# Patient Record
Sex: FEMALE | Race: BLACK OR AFRICAN AMERICAN | Employment: OTHER | URBAN - METROPOLITAN AREA
[De-identification: names, ages, dates, MRNs, and addresses within clinical notes are randomized per-mention and may not be internally consistent; named-entity substitution may affect disease eponyms.]

---

## 2017-01-10 PROBLEM — I25.10 CAD (CORONARY ARTERY DISEASE): Chronic | Status: ACTIVE | Noted: 2017-01-09

## 2017-01-10 PROBLEM — F17.210 CIGARETTE SMOKER: Chronic | Status: ACTIVE | Noted: 2017-01-09

## 2017-01-10 PROBLEM — D72.829 LEUKOCYTOSIS: Status: ACTIVE | Noted: 2017-01-09

## 2017-01-10 PROBLEM — I50.23 ACUTE ON CHRONIC SYSTOLIC CHF (CONGESTIVE HEART FAILURE) (HCC): Status: ACTIVE | Noted: 2017-01-09

## 2017-01-10 PROBLEM — J44.9 COPD (CHRONIC OBSTRUCTIVE PULMONARY DISEASE) (HCC): Chronic | Status: ACTIVE | Noted: 2017-01-09

## 2017-01-10 PROBLEM — E11.9 DM2 (DIABETES MELLITUS, TYPE 2) (HCC): Chronic | Status: ACTIVE | Noted: 2017-01-09

## 2017-01-10 PROBLEM — R10.9 ABDOMINAL WALL PAIN: Status: ACTIVE | Noted: 2017-01-09

## 2017-01-10 PROBLEM — F12.90 MARIJUANA USE: Chronic | Status: ACTIVE | Noted: 2017-01-09

## 2017-02-08 PROBLEM — K29.00 ACUTE EROSIVE GASTRITIS: Status: ACTIVE | Noted: 2017-02-08

## 2017-02-08 PROBLEM — R10.9 ABDOMINAL WALL PAIN: Status: RESOLVED | Noted: 2017-01-09 | Resolved: 2017-02-08

## 2017-02-08 PROBLEM — K59.1 FUNCTIONAL DIARRHEA: Status: ACTIVE | Noted: 2017-02-08

## 2017-02-08 PROBLEM — R06.89 HYPERCARBIA: Status: ACTIVE | Noted: 2017-02-08

## 2017-03-09 PROBLEM — I25.5 ISCHEMIC CARDIOMYOPATHY: Status: ACTIVE | Noted: 2017-03-09

## 2017-04-19 PROBLEM — E11.8 TYPE 2 DIABETES MELLITUS WITH COMPLICATION (HCC): Status: ACTIVE | Noted: 2017-04-19

## 2017-07-05 PROBLEM — Z71.6 TOBACCO ABUSE COUNSELING: Status: ACTIVE | Noted: 2017-07-05

## 2017-07-05 PROBLEM — J44.1 COPD EXACERBATION (HCC): Chronic | Status: ACTIVE | Noted: 2017-01-09

## 2017-08-30 PROBLEM — I73.9 PVD (PERIPHERAL VASCULAR DISEASE) WITH CLAUDICATION (HCC): Status: ACTIVE | Noted: 2017-08-30

## 2017-09-08 PROBLEM — K29.00 ACUTE EROSIVE GASTRITIS: Status: RESOLVED | Noted: 2017-02-08 | Resolved: 2017-09-08

## 2017-09-08 PROBLEM — J96.02 ACUTE RESPIRATORY FAILURE WITH HYPERCAPNIA (HCC): Status: ACTIVE | Noted: 2017-09-08

## 2017-10-11 PROBLEM — Z72.0 TOBACCO ABUSE: Status: ACTIVE | Noted: 2017-10-11

## 2017-10-11 PROBLEM — M79.662 PAIN IN BOTH LOWER LEGS: Status: ACTIVE | Noted: 2017-10-11

## 2017-10-11 PROBLEM — M79.661 PAIN IN BOTH LOWER LEGS: Status: ACTIVE | Noted: 2017-10-11

## 2017-10-11 PROBLEM — I87.2 CHRONIC VENOUS INSUFFICIENCY: Status: ACTIVE | Noted: 2017-10-11

## 2017-10-11 PROBLEM — I87.8 VENOUS STASIS OF BOTH LOWER EXTREMITIES: Status: ACTIVE | Noted: 2017-10-11

## 2017-10-11 PROBLEM — L03.116 CELLULITIS OF LEFT LOWER LEG: Status: ACTIVE | Noted: 2017-10-11

## 2017-10-31 PROBLEM — R04.2 COUGH WITH HEMOPTYSIS: Status: ACTIVE | Noted: 2017-10-31

## 2017-11-02 PROBLEM — F12.10 MARIJUANA ABUSE, CONTINUOUS: Chronic | Status: ACTIVE | Noted: 2017-11-02

## 2017-11-02 PROBLEM — J69.0 ASPIRATION PNEUMONIA (HCC): Status: ACTIVE | Noted: 2017-11-02

## 2017-11-02 PROBLEM — K92.2 GI BLEEDING: Status: ACTIVE | Noted: 2017-11-02

## 2017-11-02 PROBLEM — I10 ESSENTIAL HYPERTENSION: Chronic | Status: ACTIVE | Noted: 2017-11-02

## 2018-03-19 ENCOUNTER — TELEPHONE (OUTPATIENT)
Dept: ADMINISTRATIVE | Age: 63
End: 2018-03-19

## 2018-03-28 DIAGNOSIS — M25.532 LEFT WRIST PAIN: Primary | ICD-10-CM

## 2018-03-29 ENCOUNTER — OFFICE VISIT (OUTPATIENT)
Dept: PHYSICAL MEDICINE AND REHAB | Age: 63
End: 2018-03-29
Payer: MEDICARE

## 2018-03-29 VITALS — BODY MASS INDEX: 45.82 KG/M2 | HEIGHT: 62 IN | WEIGHT: 249 LBS

## 2018-03-29 DIAGNOSIS — G56.03 BILATERAL CARPAL TUNNEL SYNDROME: Primary | ICD-10-CM

## 2018-03-29 DIAGNOSIS — M48.062 SPINAL STENOSIS OF LUMBAR REGION WITH NEUROGENIC CLAUDICATION: ICD-10-CM

## 2018-03-29 DIAGNOSIS — M79.605 PAIN IN BOTH LOWER EXTREMITIES: ICD-10-CM

## 2018-03-29 DIAGNOSIS — M79.604 PAIN IN BOTH LOWER EXTREMITIES: ICD-10-CM

## 2018-03-29 PROCEDURE — 99213 OFFICE O/P EST LOW 20 MIN: CPT | Performed by: PHYSICAL MEDICINE & REHABILITATION

## 2018-03-29 PROCEDURE — G8417 CALC BMI ABV UP PARAM F/U: HCPCS | Performed by: PHYSICAL MEDICINE & REHABILITATION

## 2018-03-29 PROCEDURE — G8599 NO ASA/ANTIPLAT THER USE RNG: HCPCS | Performed by: PHYSICAL MEDICINE & REHABILITATION

## 2018-03-29 PROCEDURE — 76942 ECHO GUIDE FOR BIOPSY: CPT | Performed by: PHYSICAL MEDICINE & REHABILITATION

## 2018-03-29 PROCEDURE — 95913 NRV CNDJ TEST 13/> STUDIES: CPT | Performed by: PHYSICAL MEDICINE & REHABILITATION

## 2018-03-29 PROCEDURE — 95886 MUSC TEST DONE W/N TEST COMP: CPT | Performed by: PHYSICAL MEDICINE & REHABILITATION

## 2018-03-29 PROCEDURE — 3017F COLORECTAL CA SCREEN DOC REV: CPT | Performed by: PHYSICAL MEDICINE & REHABILITATION

## 2018-03-29 PROCEDURE — 4004F PT TOBACCO SCREEN RCVD TLK: CPT | Performed by: PHYSICAL MEDICINE & REHABILITATION

## 2018-03-29 PROCEDURE — G8484 FLU IMMUNIZE NO ADMIN: HCPCS | Performed by: PHYSICAL MEDICINE & REHABILITATION

## 2018-03-29 PROCEDURE — 20526 THER INJECTION CARP TUNNEL: CPT | Performed by: PHYSICAL MEDICINE & REHABILITATION

## 2018-03-29 PROCEDURE — 3014F SCREEN MAMMO DOC REV: CPT | Performed by: PHYSICAL MEDICINE & REHABILITATION

## 2018-03-29 PROCEDURE — G8428 CUR MEDS NOT DOCUMENT: HCPCS | Performed by: PHYSICAL MEDICINE & REHABILITATION

## 2018-03-29 RX ORDER — TRIAMCINOLONE ACETONIDE 40 MG/ML
40 INJECTION, SUSPENSION INTRA-ARTICULAR; INTRAMUSCULAR ONCE
Status: COMPLETED | OUTPATIENT
Start: 2018-03-29 | End: 2018-03-29

## 2018-03-29 RX ORDER — LIDOCAINE HYDROCHLORIDE 10 MG/ML
1 INJECTION, SOLUTION INFILTRATION; PERINEURAL ONCE
Status: COMPLETED | OUTPATIENT
Start: 2018-03-29 | End: 2018-03-29

## 2018-03-29 RX ADMIN — LIDOCAINE HYDROCHLORIDE 1 ML: 10 INJECTION, SOLUTION INFILTRATION; PERINEURAL at 13:53

## 2018-03-29 RX ADMIN — TRIAMCINOLONE ACETONIDE 40 MG: 40 INJECTION, SUSPENSION INTRA-ARTICULAR; INTRAMUSCULAR at 13:54

## 2018-03-29 NOTE — PROGRESS NOTES
recording the abductor digiti minimi stimulating the ulnar nerve at the wrist, recording at the extensor digitorum brevis stimulating the peroneal nerve at the ankle and recording the abductor hallucis stimulating tibial at the medial malleolus. For H-Reflexes the site of stimulation is in the popliteal fossa and the recording site is the gastrocnemius. For F waves, the site of stimulation is at the wrist or ankle respectively and the muscle recorded is the same as that used for the compound motor unit action potentials as described in the table. Sensory NCS      Nerve / Sites Rec. Site Peak Lat PP Amp Segments Distance Velocity Temp. ms µV  cm m/s °C   R Median - Digit II (Antidromic)      Palm Dig II 1.67 31.8 Palm - Dig II 7 56 32.6      Wrist Dig II 3.44 28.9 Wrist - Dig II 14 53 32.6   L Median - Digit II (Antidromic)      Palm Dig II 2.40 8.2 Palm - Dig II 7 46 32.3      Wrist Dig II 6.15* 5.3 Wrist - Dig II 14 27 32.3   R Ulnar - Digit V (Antidromic)      Wrist Dig V 3.07 18.9 Wrist - Dig V 14 55 32.6   R Radial - Anatomical snuff box (Forearm)      Forearm Wrist 2.24 22.3 Forearm - Wrist 10 64 32.6   R Sural - Ankle (Calf)      Calf Ankle 3.54 12.4 Calf - Ankle 14 47 31.3   R Superficial peroneal - Ankle      Lat leg Ankle 2.81 7.9 Lat leg - Ankle 10 45 31.2       Combined Sensory Index      Nerve / Sites Rec. Site Peak Lat NP Amp PP Amp Segments Dist. Peak Diff Temp.      ms µV µV  cm ms °C   R Median - CSI      Median Thumb 2.97 13.0 23.9 Median - Radial 10 0.68* 32.3      Radial Thumb 2.29 12.3 14.4 Median - Ulnar 14 0.73* 32.3      Median Ring 3.70 8.8 10.9 Median palm - Ulnar palm 8        Ulnar Ring 2.97 0.05 3.5          CSI     CSI  1.41*            Motor NCS      Nerve / Sites Muscle Onset Amplitude Segments Distance Velocity Temp.     ms mV  cm m/s °C   R Median - APB      Palm APB 2.14 7.6 Palm - APB   32.3      Wrist APB 3.85 4.9 Wrist - Palm 8 47* 32.3      Elbow APB 7.92 4.2 Elbow - Wrist 21 52 32.3   L Median - APB      Palm APB 2.29 6.9 Palm - APB   32.3      Wrist APB 6.41* 6.6 Wrist - Palm 8 19* 32.3      Elbow APB 10.94 5.7 Elbow - Wrist 21.5 47 32.3   R Ulnar - ADM      Wrist ADM 2.92 11.9 Wrist - ADM 8  32.6      B. Elbow ADM 6.93 11.5 B. Elbow - Wrist 20 50 32.6      A. Elbow ADM 8.80 11.3 A. Elbow - B. Elbow 10 53 32.6   R Peroneal - EDB      Ankle EDB 3.33 5.4 Ankle - EDB 8  32.3      Fib head EDB 9.48 4.7 Fib head - Ankle 29 47 32.1      Above Knee EDB 11.72 4.2 Above Knee - Fib head 10 45 32.1   R Tibial - AH      Ankle AH 3.07 10.4 Ankle - AH 8  32.4      Pop fossa AH 12.14 8.3 Pop fossa - Ankle 39 43 32.4       F  Wave      Nerve Fmin % F    ms %   R Median - APB 25.99 80   R Ulnar - ADM 28.59 80   R Peroneal - EDB 43.96 60   R Tibial - AH 49.11 40   L Median - APB 34.17* 40       H Reflex      Nerve H Lat    ms   L Tibial - Soleus 32.19   R Tibial - Soleus 32.29          Disposable concentric needle EMG was performed on the Bilateral upper and lower extremities as listed below. Fibrillations, positive sharp waves and fasciculations are graded from none (0) to continuous (4+). The configuration and recruitment pattern of motor unit action potentials under voluntary control are described below. EMG      EMG Summary Table     Spontaneous MUAP Recruitment   Muscle Nerve Roots IA Fib PSW Fasc Amp Dur. PPP Pattern   L. Biceps brachii Musculocutaneous C5-C6 N None None None N N N N   L. Triceps brachii Radial C6-C8 N None None None N N N N   L. Pronator teres Median C6-C7 N None None None N N N N   L. First dorsal interosseous Ulnar C8-T1 N None None None N N N N   L. Abductor pollicis brevis Median L3-Z8 N None None None 1+ 1+ 1+ Reduced   L. Vastus medialis Femoral L2-L4 N None None None N N N N   L. Tibialis anterior Deep peroneal (Fibular) L4-L5 N None None None N N N N   L. Gastrocnemius (Medial head) Tibial S1-S2 N None None None N N N N   L.  Extensor hallucis longus Deep participate in the care of your patient.       Sincerely,       Jeannie Staley LPN, CNCT  [facility]il[facility] Gilford, D.O., P[facility]T., [facility]  Physical Medicine and Rehabilitation  Electrodiagnostic Medicine    Normal nerve Conduction Values    Sensory Nerves Peak to Peak  Amplitude  (mV) Peak Latency (ms)   Superficial Radial Sensory Antidromic (10cm) 11 2.8    Median Sensory Antidromic Dig II   Palm (7cm)  Wrist (14 cm)  Age 19-49 BMI <24  Age 47-78 BMI <24  Age 20-48 BMI >/= 24  Age 47-78 BMI >/= 24   8  13  19  15  13  8   2.3  4     Ulnar Sensory Antidromic Dig V (14 cm)  Age 20-48 BMI <24  Age 47-78 BMI <24  Age 20-48 BMI >/= 24  Age 47-78 BMI >/= 24 9  13  13  8  4 4   Medial Antebrachial cutaneous Sensory Antidromic (10 cm)   3 2.6   Lateral Antebrachial cutaneous Sensory Antidromic (10 cm) 6 2.5   Sural Sensory Antidromic (14 cm) 4 4.5     Medial and lateral Plantars (14 cm)   Compare side to side <3.8     Superficial peroneal sensory (10 cm)  Age <9  Age 7-34  Age 35-46  Age 52-63  Age >57   >6  >6  >5  >4  >3   <4.3  <4.4  <4.5  <4.6  <4.6     Saphenous sensory (12 cm)  Age <9  Age 7-34  Age 35-46  Age 52-63  Age >57   >6  >6  >4  >4  >3   <4.3  <4.4  <4.5  <4.6  <4.6     Dorsal ulnar sensory (8 cm)  Age <9  Age 7-34  Age 35-46  Age 52-63  Age >57   >24  >24  >16  >9  >5   <2.9  <3  <3.1  <3.1  <3.2         Study Latency difference (ms)   Median compared to (minus) ulnar motor comparison  All ages  Age 20-48  Age 47-78   1.5  1.4  1.7   Median to Ulnar comparison (second lumbrical/interossei)  0.4     Combined Sensory Index:   Study Latency Difference (ms)</=   Median to Ulnar Palmar Orthodromic mixed nerve comparison 0.3   Median to Ulnar sensory Ring finger comparison 0.4       Median: Radial sensory digit 1 comparison 0.5     Combined Sensory Index (CSI)  0.9       Motor Nerves Baseline to Peak Amplitude  (mV) Conduction Velocity (m/s) Distal Latency (ms)   Median motor APB  All ages  Men Age23 to 44  Men

## 2018-03-29 NOTE — PROGRESS NOTES
Nahomy Ace D.O. [facility] Physical Medicine and Rehabilitation  [address] [facility] [facility] [place]  Phone: [phone]  Fax: [phone]      Date of Examination: 03/29/18  Patient Name: Chrissy Henson    Dear Dr. Cassandra Munoz  is a 60y.o. year old woman who was seen today regarding EMG testing . As you know, this is due to complaints of pain in the bilateral hands R>L as well as low back with radiation to the lower extremities, numbness and tingling in the right hand, and weakness in the upper and lower extremities that has been present for approximately (2) yerars and started after no recent injury. There has been no associated vision change, hearing change, speech abnormality, swallowing abnormality, or bowel or bladder dysfunction. Previous workup has included: CT. A previous EMG  was done by an unknown provider approximately 3-4 years ago. Results are unavailable at time of testing. Patient could not recall name of provider. Antiplatelets:No. Coumadin: No  Mestinon: No. Botox No.     Adhesive: No.  Isopropyl alcohol: No.  Latex: Yes. There is not family history of neuromuscular conditions. Otherwise, the past medical, past surgical, family and social histories were reviewed and there were no pertinent changes. Medications and allergies were reviewed. Physical Exam: General: The patient is in no apparent distress. Body habitus is well developed. MSK: There is no joint effusion, deformity, instability, swelling, erythema or warmth. AROM is full in the spine and extremities. + Tinel bilateral wrists.  + SLR bilaterally. Neurologic:  No focal sensorimotor deficit. Reflexes 2+ and symmetric. Gait is normal.    Impression:     1. Bilateral carpal tunnel syndrome    2. Pain in both lower extremities        Plan:   · EMG is indicated to evaluate the above diagnosis.     · Consent: The patient was advised of the indications, risks, benefits and

## 2018-03-29 NOTE — PATIENT INSTRUCTIONS
Electrodiagnotic Laboratory  Accredited by the [facility] with Exemplary status  Johnna Butler, D.O Zilphia Gilford, D.O. [facility]  [address] [facility] [place]  Phone: [phone]  Fax: [phone]        Today you had an electrodiagnostic exam which included nerve conduction studies (NCS) and electromyography (EMG). This test evaluated the electrical activity of your nerves and muscles to help determine if you have a nerve or muscle disease. This test can help determine the location and type of a nerve or muscle problem. This will help your referring doctor diagnose your condition and determine the appropriate next step in your treatment plan. After your test:    1. There are no long lasting side effects of the test.     2. You may resume your normal activities without restrictions. 3.  Resume any medications that were stopped for the test.     4  If you have sore areas or bruising in your muscles where the needle was placed, apply a cold pack to the sore area for 15-20 minutes three to four times a day as needed for pain. The soreness should go away in about 1-2 days. 5. Your results were provided  Briefly at the end of your test and the final detailed report will be provided to your referring physician, and/or primary care physician and any other parties you requested within 1-2 days of the examination. You may wish to contact your referring provider after a few days to determine what they would like you to do next. 6.  Please call [phone] with any questions or concerns and if you develop increased body temperature/fever, swelling, tenderness, increased pain and/or drainage from the sites where the needle was placed. Thank you for choosing us for your health care needs.

## 2018-04-05 ENCOUNTER — HOSPITAL ENCOUNTER (OUTPATIENT)
Dept: INTERVENTIONAL RADIOLOGY/VASCULAR | Age: 63
Discharge: HOME | End: 2018-04-07
Payer: MEDICARE

## 2018-04-05 DIAGNOSIS — I73.9 PVD (PERIPHERAL VASCULAR DISEASE) WITH CLAUDICATION (HCC): ICD-10-CM

## 2018-04-05 PROCEDURE — 93925 LOWER EXTREMITY STUDY: CPT

## 2018-04-09 ENCOUNTER — TELEPHONE (OUTPATIENT)
Dept: PHYSICAL MEDICINE AND REHAB | Age: 63
End: 2018-04-09

## 2018-04-09 ENCOUNTER — OFFICE VISIT (OUTPATIENT)
Dept: ORTHOPEDIC SURGERY | Age: 63
End: 2018-04-09
Payer: MEDICARE

## 2018-04-09 DIAGNOSIS — G56.02 LEFT CARPAL TUNNEL SYNDROME: Primary | ICD-10-CM

## 2018-04-09 PROCEDURE — 3017F COLORECTAL CA SCREEN DOC REV: CPT | Performed by: ORTHOPAEDIC SURGERY

## 2018-04-09 PROCEDURE — G8417 CALC BMI ABV UP PARAM F/U: HCPCS | Performed by: ORTHOPAEDIC SURGERY

## 2018-04-09 PROCEDURE — G8599 NO ASA/ANTIPLAT THER USE RNG: HCPCS | Performed by: ORTHOPAEDIC SURGERY

## 2018-04-09 PROCEDURE — 99214 OFFICE O/P EST MOD 30 MIN: CPT | Performed by: ORTHOPAEDIC SURGERY

## 2018-04-09 PROCEDURE — 3014F SCREEN MAMMO DOC REV: CPT | Performed by: ORTHOPAEDIC SURGERY

## 2018-04-09 PROCEDURE — G8428 CUR MEDS NOT DOCUMENT: HCPCS | Performed by: ORTHOPAEDIC SURGERY

## 2018-04-09 PROCEDURE — 4004F PT TOBACCO SCREEN RCVD TLK: CPT | Performed by: ORTHOPAEDIC SURGERY

## 2018-04-10 ENCOUNTER — TELEPHONE (OUTPATIENT)
Dept: ADMINISTRATIVE | Age: 63
End: 2018-04-10

## 2018-04-11 ENCOUNTER — HOSPITAL ENCOUNTER (OUTPATIENT)
Age: 63
Discharge: HOME | End: 2018-04-13

## 2018-04-11 PROCEDURE — 88305 TISSUE EXAM BY PATHOLOGIST: CPT

## 2018-04-12 ENCOUNTER — TELEPHONE (OUTPATIENT)
Dept: VASCULAR SURGERY | Age: 63
End: 2018-04-12

## 2018-04-12 ENCOUNTER — TELEPHONE (OUTPATIENT)
Dept: PHYSICAL MEDICINE AND REHAB | Age: 63
End: 2018-04-12

## 2018-04-13 ENCOUNTER — TELEPHONE (OUTPATIENT)
Dept: VASCULAR SURGERY | Age: 63
End: 2018-04-13

## 2018-04-16 ENCOUNTER — TELEPHONE (OUTPATIENT)
Dept: VASCULAR SURGERY | Age: 63
End: 2018-04-16

## 2018-04-17 RX ORDER — DULOXETIN HYDROCHLORIDE 60 MG/1
CAPSULE, DELAYED RELEASE ORAL
Qty: 180 CAPSULE | Refills: 0 | Status: SHIPPED | OUTPATIENT
Start: 2018-04-17 | End: 2018-08-13 | Stop reason: SDUPTHER

## 2018-04-20 RX ORDER — GABAPENTIN 300 MG/1
600 CAPSULE ORAL 3 TIMES DAILY
Qty: 180 CAPSULE | Refills: 2 | Status: SHIPPED | OUTPATIENT
Start: 2018-04-20 | End: 2018-07-09 | Stop reason: SDUPTHER

## 2018-05-07 ENCOUNTER — TELEPHONE (OUTPATIENT)
Dept: CARDIOLOGY CLINIC | Age: 63
End: 2018-05-07

## 2018-05-07 ENCOUNTER — TELEPHONE (OUTPATIENT)
Dept: PREADMISSION TESTING | Age: 63
End: 2018-05-07

## 2018-05-09 ENCOUNTER — OFFICE VISIT (OUTPATIENT)
Dept: FAMILY MEDICINE CLINIC | Age: 63
End: 2018-05-09
Payer: MEDICARE

## 2018-05-09 VITALS
OXYGEN SATURATION: 90 % | BODY MASS INDEX: 47.11 KG/M2 | HEIGHT: 62 IN | TEMPERATURE: 98.7 F | HEART RATE: 70 BPM | RESPIRATION RATE: 24 BRPM | SYSTOLIC BLOOD PRESSURE: 134 MMHG | WEIGHT: 256 LBS | DIASTOLIC BLOOD PRESSURE: 88 MMHG

## 2018-05-09 DIAGNOSIS — G47.33 OSA AND COPD OVERLAP SYNDROME (HCC): ICD-10-CM

## 2018-05-09 DIAGNOSIS — J44.9 OSA AND COPD OVERLAP SYNDROME (HCC): ICD-10-CM

## 2018-05-09 DIAGNOSIS — Z23 NEED FOR PROPHYLACTIC VACCINATION AND INOCULATION AGAINST VARICELLA: ICD-10-CM

## 2018-05-09 DIAGNOSIS — K21.9 GASTROESOPHAGEAL REFLUX DISEASE WITHOUT ESOPHAGITIS: Chronic | ICD-10-CM

## 2018-05-09 DIAGNOSIS — K44.9 HIATAL HERNIA: ICD-10-CM

## 2018-05-09 DIAGNOSIS — N39.46 MIXED INCONTINENCE URGE AND STRESS: ICD-10-CM

## 2018-05-09 DIAGNOSIS — E55.9 VITAMIN D INSUFFICIENCY: ICD-10-CM

## 2018-05-09 DIAGNOSIS — Z13.220 SCREENING FOR HYPERLIPIDEMIA: ICD-10-CM

## 2018-05-09 DIAGNOSIS — E78.5 HYPERLIPIDEMIA, UNSPECIFIED HYPERLIPIDEMIA TYPE: ICD-10-CM

## 2018-05-09 DIAGNOSIS — G89.29 CHRONIC THORACIC BACK PAIN, UNSPECIFIED BACK PAIN LATERALITY: Chronic | ICD-10-CM

## 2018-05-09 DIAGNOSIS — E66.01 MORBID OBESITY DUE TO EXCESS CALORIES (HCC): ICD-10-CM

## 2018-05-09 DIAGNOSIS — Z79.4 TYPE 2 DIABETES MELLITUS WITH COMPLICATION, WITH LONG-TERM CURRENT USE OF INSULIN (HCC): Primary | Chronic | ICD-10-CM

## 2018-05-09 DIAGNOSIS — F17.210 CIGARETTE SMOKER: Chronic | ICD-10-CM

## 2018-05-09 DIAGNOSIS — E11.8 TYPE 2 DIABETES MELLITUS WITH COMPLICATION, WITH LONG-TERM CURRENT USE OF INSULIN (HCC): Primary | Chronic | ICD-10-CM

## 2018-05-09 DIAGNOSIS — I50.42 CHRONIC COMBINED SYSTOLIC AND DIASTOLIC CHF (CONGESTIVE HEART FAILURE) (HCC): ICD-10-CM

## 2018-05-09 DIAGNOSIS — Z23 NEED FOR PROPHYLACTIC VACCINATION AGAINST DIPHTHERIA-TETANUS-PERTUSSIS (DTP): ICD-10-CM

## 2018-05-09 DIAGNOSIS — M54.6 CHRONIC THORACIC BACK PAIN, UNSPECIFIED BACK PAIN LATERALITY: Chronic | ICD-10-CM

## 2018-05-09 DIAGNOSIS — I25.10 CORONARY ARTERY DISEASE INVOLVING NATIVE CORONARY ARTERY OF NATIVE HEART WITHOUT ANGINA PECTORIS: Chronic | ICD-10-CM

## 2018-05-09 DIAGNOSIS — F12.90 MARIJUANA USE: Chronic | ICD-10-CM

## 2018-05-09 DIAGNOSIS — I10 ESSENTIAL HYPERTENSION: Chronic | ICD-10-CM

## 2018-05-09 PROBLEM — J69.0 ASPIRATION PNEUMONIA (HCC): Status: RESOLVED | Noted: 2017-11-02 | Resolved: 2018-05-09

## 2018-05-09 PROBLEM — M79.662 PAIN IN BOTH LOWER LEGS: Status: RESOLVED | Noted: 2017-10-11 | Resolved: 2018-05-09

## 2018-05-09 PROBLEM — K59.1 FUNCTIONAL DIARRHEA: Status: RESOLVED | Noted: 2017-02-08 | Resolved: 2018-05-09

## 2018-05-09 PROBLEM — J44.1 COPD EXACERBATION (HCC): Chronic | Status: RESOLVED | Noted: 2017-01-09 | Resolved: 2018-05-09

## 2018-05-09 PROBLEM — F12.10 MARIJUANA ABUSE, CONTINUOUS: Chronic | Status: RESOLVED | Noted: 2017-11-02 | Resolved: 2018-05-09

## 2018-05-09 PROBLEM — R04.2 COUGH WITH HEMOPTYSIS: Status: RESOLVED | Noted: 2017-10-31 | Resolved: 2018-05-09

## 2018-05-09 PROBLEM — I50.23 ACUTE ON CHRONIC SYSTOLIC CHF (CONGESTIVE HEART FAILURE) (HCC): Status: RESOLVED | Noted: 2017-01-09 | Resolved: 2018-05-09

## 2018-05-09 PROBLEM — R06.89 HYPERCARBIA: Status: RESOLVED | Noted: 2017-02-08 | Resolved: 2018-05-09

## 2018-05-09 PROBLEM — Z71.6 TOBACCO ABUSE COUNSELING: Status: RESOLVED | Noted: 2017-07-05 | Resolved: 2018-05-09

## 2018-05-09 PROBLEM — L03.116 CELLULITIS OF LEFT LOWER LEG: Status: RESOLVED | Noted: 2017-10-11 | Resolved: 2018-05-09

## 2018-05-09 PROBLEM — M79.661 PAIN IN BOTH LOWER LEGS: Status: RESOLVED | Noted: 2017-10-11 | Resolved: 2018-05-09

## 2018-05-09 PROBLEM — J96.02 ACUTE RESPIRATORY FAILURE WITH HYPERCAPNIA (HCC): Status: RESOLVED | Noted: 2017-09-08 | Resolved: 2018-05-09

## 2018-05-09 PROBLEM — D72.829 LEUKOCYTOSIS: Status: RESOLVED | Noted: 2017-01-09 | Resolved: 2018-05-09

## 2018-05-09 PROBLEM — K92.2 GI BLEEDING: Status: RESOLVED | Noted: 2017-11-02 | Resolved: 2018-05-09

## 2018-05-09 PROBLEM — Z72.0 TOBACCO ABUSE: Status: RESOLVED | Noted: 2017-10-11 | Resolved: 2018-05-09

## 2018-05-09 LAB
CREATININE URINE POCT: 100
HBA1C MFR BLD: 5.9 %
MICROALBUMIN/CREAT 24H UR: 150 MG/G{CREAT}
MICROALBUMIN/CREAT UR-RTO: >300

## 2018-05-09 PROCEDURE — 2022F DILAT RTA XM EVC RTNOPTHY: CPT | Performed by: FAMILY MEDICINE

## 2018-05-09 PROCEDURE — G8427 DOCREV CUR MEDS BY ELIG CLIN: HCPCS | Performed by: FAMILY MEDICINE

## 2018-05-09 PROCEDURE — 82044 UR ALBUMIN SEMIQUANTITATIVE: CPT | Performed by: FAMILY MEDICINE

## 2018-05-09 PROCEDURE — G8926 SPIRO NO PERF OR DOC: HCPCS | Performed by: FAMILY MEDICINE

## 2018-05-09 PROCEDURE — 99205 OFFICE O/P NEW HI 60 MIN: CPT | Performed by: FAMILY MEDICINE

## 2018-05-09 PROCEDURE — 3017F COLORECTAL CA SCREEN DOC REV: CPT | Performed by: FAMILY MEDICINE

## 2018-05-09 PROCEDURE — 4004F PT TOBACCO SCREEN RCVD TLK: CPT | Performed by: FAMILY MEDICINE

## 2018-05-09 PROCEDURE — 3044F HG A1C LEVEL LT 7.0%: CPT | Performed by: FAMILY MEDICINE

## 2018-05-09 PROCEDURE — 3023F SPIROM DOC REV: CPT | Performed by: FAMILY MEDICINE

## 2018-05-09 PROCEDURE — G8417 CALC BMI ABV UP PARAM F/U: HCPCS | Performed by: FAMILY MEDICINE

## 2018-05-09 PROCEDURE — 83036 HEMOGLOBIN GLYCOSYLATED A1C: CPT | Performed by: FAMILY MEDICINE

## 2018-05-09 PROCEDURE — G8598 ASA/ANTIPLAT THER USED: HCPCS | Performed by: FAMILY MEDICINE

## 2018-05-09 RX ORDER — MONTELUKAST SODIUM 4 MG/1
TABLET, CHEWABLE ORAL
Refills: 2 | COMMUNITY
Start: 2018-04-28 | End: 2018-06-04

## 2018-05-09 RX ORDER — OXYBUTYNIN CHLORIDE 10 MG/1
10 TABLET, EXTENDED RELEASE ORAL DAILY
Qty: 30 TABLET | Refills: 3 | Status: SHIPPED | OUTPATIENT
Start: 2018-05-09 | End: 2018-06-04

## 2018-05-09 RX ORDER — ASPIRIN 81 MG/1
TABLET, CHEWABLE ORAL
Refills: 0 | COMMUNITY
Start: 2018-03-15 | End: 2019-01-09 | Stop reason: SDUPTHER

## 2018-05-09 RX ORDER — LUBIPROSTONE 8 UG/1
CAPSULE, GELATIN COATED ORAL
Refills: 2 | COMMUNITY
Start: 2018-04-27 | End: 2018-08-08 | Stop reason: ALTCHOICE

## 2018-05-09 RX ORDER — DICYCLOMINE HYDROCHLORIDE 10 MG/1
CAPSULE ORAL
Refills: 2 | COMMUNITY
Start: 2018-04-15 | End: 2018-06-04

## 2018-05-09 RX ORDER — BUPROPION HYDROCHLORIDE 100 MG/1
TABLET, EXTENDED RELEASE ORAL
Refills: 11 | COMMUNITY
Start: 2018-04-16 | End: 2018-06-04

## 2018-05-09 RX ORDER — TIOTROPIUM BROMIDE INHALATION SPRAY 1.56 UG/1
SPRAY, METERED RESPIRATORY (INHALATION)
Refills: 6 | COMMUNITY
Start: 2018-04-05 | End: 2018-07-26 | Stop reason: ALTCHOICE

## 2018-05-09 RX ORDER — LEVALBUTEROL INHALATION SOLUTION 0.31 MG/3ML
2 SOLUTION RESPIRATORY (INHALATION)
COMMUNITY
End: 2018-07-26 | Stop reason: ALTCHOICE

## 2018-05-09 RX ORDER — INSULIN LISPRO 100 [IU]/ML
INJECTION, SUSPENSION SUBCUTANEOUS
COMMUNITY
End: 2018-05-09 | Stop reason: ALTCHOICE

## 2018-05-09 RX ORDER — SIMVASTATIN 10 MG
TABLET ORAL
COMMUNITY
End: 2018-05-09 | Stop reason: ALTCHOICE

## 2018-05-09 RX ORDER — LEVOFLOXACIN 250 MG/1
TABLET ORAL
COMMUNITY
End: 2018-05-09 | Stop reason: ALTCHOICE

## 2018-05-09 ASSESSMENT — PATIENT HEALTH QUESTIONNAIRE - PHQ9
SUM OF ALL RESPONSES TO PHQ QUESTIONS 1-9: 1
2. FEELING DOWN, DEPRESSED OR HOPELESS: 1
1. LITTLE INTEREST OR PLEASURE IN DOING THINGS: 0
SUM OF ALL RESPONSES TO PHQ9 QUESTIONS 1 & 2: 1

## 2018-05-10 ASSESSMENT — ENCOUNTER SYMPTOMS
COUGH: 0
ABDOMINAL PAIN: 1
SHORTNESS OF BREATH: 0
BACK PAIN: 1
NAUSEA: 0
VOMITING: 0
CONSTIPATION: 0
DIARRHEA: 1
SINUS PAIN: 0
TROUBLE SWALLOWING: 0
SORE THROAT: 0
RHINORRHEA: 0

## 2018-05-17 ENCOUNTER — TELEPHONE (OUTPATIENT)
Dept: ADMINISTRATIVE | Age: 63
End: 2018-05-17

## 2018-06-04 ENCOUNTER — APPOINTMENT (OUTPATIENT)
Dept: GENERAL RADIOLOGY | Age: 63
DRG: 281 | End: 2018-06-04
Payer: MEDICARE

## 2018-06-04 ENCOUNTER — HOSPITAL ENCOUNTER (INPATIENT)
Age: 63
LOS: 3 days | Discharge: HOME HEALTH | DRG: 281 | End: 2018-06-07
Attending: EMERGENCY MEDICINE | Admitting: INTERNAL MEDICINE
Payer: MEDICARE

## 2018-06-04 DIAGNOSIS — I21.4 NON-ST ELEVATION (NSTEMI) MYOCARDIAL INFARCTION (HCC): Primary | ICD-10-CM

## 2018-06-04 DIAGNOSIS — I50.9 ACUTE ON CHRONIC CONGESTIVE HEART FAILURE, UNSPECIFIED CONGESTIVE HEART FAILURE TYPE: ICD-10-CM

## 2018-06-04 LAB
ALBUMIN SERPL-MCNC: 3.6 G/DL (ref 3.5–5.2)
ALP BLD-CCNC: 88 U/L (ref 35–104)
ALT SERPL-CCNC: 17 U/L (ref 0–32)
ANION GAP SERPL CALCULATED.3IONS-SCNC: 10 MMOL/L (ref 7–16)
APTT: 37 SEC (ref 24.5–35.1)
AST SERPL-CCNC: 13 U/L (ref 0–31)
BASOPHILS ABSOLUTE: 0.02 E9/L (ref 0–0.2)
BASOPHILS RELATIVE PERCENT: 0.3 % (ref 0–2)
BILIRUB SERPL-MCNC: 0.2 MG/DL (ref 0–1.2)
BUN BLDV-MCNC: 16 MG/DL (ref 8–23)
CALCIUM SERPL-MCNC: 9.1 MG/DL (ref 8.6–10.2)
CHLORIDE BLD-SCNC: 103 MMOL/L (ref 98–107)
CO2: 27 MMOL/L (ref 22–29)
CREAT SERPL-MCNC: 1 MG/DL (ref 0.5–1)
EOSINOPHILS ABSOLUTE: 0.16 E9/L (ref 0.05–0.5)
EOSINOPHILS RELATIVE PERCENT: 2.2 % (ref 0–6)
GFR AFRICAN AMERICAN: >60
GFR NON-AFRICAN AMERICAN: >60 ML/MIN/1.73
GLUCOSE BLD-MCNC: 174 MG/DL (ref 74–109)
HCT VFR BLD CALC: 45.1 % (ref 34–48)
HEMOGLOBIN: 14.1 G/DL (ref 11.5–15.5)
IMMATURE GRANULOCYTES #: 0.03 E9/L
IMMATURE GRANULOCYTES %: 0.4 % (ref 0–5)
INR BLD: 1
LYMPHOCYTES ABSOLUTE: 1.39 E9/L (ref 1.5–4)
LYMPHOCYTES RELATIVE PERCENT: 18.8 % (ref 20–42)
MCH RBC QN AUTO: 30.9 PG (ref 26–35)
MCHC RBC AUTO-ENTMCNC: 31.3 % (ref 32–34.5)
MCV RBC AUTO: 98.9 FL (ref 80–99.9)
MONOCYTES ABSOLUTE: 0.39 E9/L (ref 0.1–0.95)
MONOCYTES RELATIVE PERCENT: 5.3 % (ref 2–12)
NEUTROPHILS ABSOLUTE: 5.39 E9/L (ref 1.8–7.3)
NEUTROPHILS RELATIVE PERCENT: 73 % (ref 43–80)
PDW BLD-RTO: 13.1 FL (ref 11.5–15)
PLATELET # BLD: 267 E9/L (ref 130–450)
PMV BLD AUTO: 10.6 FL (ref 7–12)
POTASSIUM SERPL-SCNC: 4.6 MMOL/L (ref 3.5–5)
PRO-BNP: 976 PG/ML (ref 0–125)
PROTHROMBIN TIME: 12.1 SEC (ref 9.3–12.4)
RBC # BLD: 4.56 E12/L (ref 3.5–5.5)
SODIUM BLD-SCNC: 140 MMOL/L (ref 132–146)
TOTAL PROTEIN: 7 G/DL (ref 6.4–8.3)
TROPONIN: 0.51 NG/ML (ref 0–0.03)
WBC # BLD: 7.4 E9/L (ref 4.5–11.5)

## 2018-06-04 PROCEDURE — 96374 THER/PROPH/DIAG INJ IV PUSH: CPT

## 2018-06-04 PROCEDURE — 99285 EMERGENCY DEPT VISIT HI MDM: CPT

## 2018-06-04 PROCEDURE — 6370000000 HC RX 637 (ALT 250 FOR IP): Performed by: EMERGENCY MEDICINE

## 2018-06-04 PROCEDURE — 83880 ASSAY OF NATRIURETIC PEPTIDE: CPT

## 2018-06-04 PROCEDURE — 96375 TX/PRO/DX INJ NEW DRUG ADDON: CPT

## 2018-06-04 PROCEDURE — 94640 AIRWAY INHALATION TREATMENT: CPT

## 2018-06-04 PROCEDURE — 2500000003 HC RX 250 WO HCPCS: Performed by: EMERGENCY MEDICINE

## 2018-06-04 PROCEDURE — 6360000002 HC RX W HCPCS: Performed by: EMERGENCY MEDICINE

## 2018-06-04 PROCEDURE — 94664 DEMO&/EVAL PT USE INHALER: CPT

## 2018-06-04 PROCEDURE — 84484 ASSAY OF TROPONIN QUANT: CPT

## 2018-06-04 PROCEDURE — 85610 PROTHROMBIN TIME: CPT

## 2018-06-04 PROCEDURE — 6370000000 HC RX 637 (ALT 250 FOR IP)

## 2018-06-04 PROCEDURE — 2580000003 HC RX 258

## 2018-06-04 PROCEDURE — 85730 THROMBOPLASTIN TIME PARTIAL: CPT

## 2018-06-04 PROCEDURE — 85025 COMPLETE CBC W/AUTO DIFF WBC: CPT

## 2018-06-04 PROCEDURE — 96372 THER/PROPH/DIAG INJ SC/IM: CPT

## 2018-06-04 PROCEDURE — 6370000000 HC RX 637 (ALT 250 FOR IP): Performed by: INTERNAL MEDICINE

## 2018-06-04 PROCEDURE — 71045 X-RAY EXAM CHEST 1 VIEW: CPT

## 2018-06-04 PROCEDURE — 2060000000 HC ICU INTERMEDIATE R&B

## 2018-06-04 PROCEDURE — 80053 COMPREHEN METABOLIC PANEL: CPT

## 2018-06-04 RX ORDER — FENTANYL CITRATE 50 UG/ML
50 INJECTION, SOLUTION INTRAMUSCULAR; INTRAVENOUS ONCE
Status: COMPLETED | OUTPATIENT
Start: 2018-06-04 | End: 2018-06-04

## 2018-06-04 RX ORDER — CETIRIZINE HYDROCHLORIDE 10 MG/1
10 TABLET ORAL DAILY
Status: DISCONTINUED | OUTPATIENT
Start: 2018-06-05 | End: 2018-06-07 | Stop reason: HOSPADM

## 2018-06-04 RX ORDER — BUMETANIDE 1 MG/1
1 TABLET ORAL DAILY
Status: DISCONTINUED | OUTPATIENT
Start: 2018-06-05 | End: 2018-06-05

## 2018-06-04 RX ORDER — ISOSORBIDE MONONITRATE 60 MG/1
60 TABLET, EXTENDED RELEASE ORAL DAILY
Status: DISCONTINUED | OUTPATIENT
Start: 2018-06-05 | End: 2018-06-07 | Stop reason: HOSPADM

## 2018-06-04 RX ORDER — NICOTINE POLACRILEX 4 MG
15 LOZENGE BUCCAL PRN
Status: DISCONTINUED | OUTPATIENT
Start: 2018-06-04 | End: 2018-06-07 | Stop reason: HOSPADM

## 2018-06-04 RX ORDER — CLONIDINE HYDROCHLORIDE 0.1 MG/1
0.2 TABLET ORAL ONCE
Status: COMPLETED | OUTPATIENT
Start: 2018-06-04 | End: 2018-06-04

## 2018-06-04 RX ORDER — ONDANSETRON 2 MG/ML
4 INJECTION INTRAMUSCULAR; INTRAVENOUS EVERY 8 HOURS PRN
Status: DISCONTINUED | OUTPATIENT
Start: 2018-06-04 | End: 2018-06-04 | Stop reason: SDUPTHER

## 2018-06-04 RX ORDER — IPRATROPIUM BROMIDE AND ALBUTEROL SULFATE 2.5; .5 MG/3ML; MG/3ML
1 SOLUTION RESPIRATORY (INHALATION)
Status: COMPLETED | OUTPATIENT
Start: 2018-06-04 | End: 2018-06-04

## 2018-06-04 RX ORDER — URSODIOL 300 MG/1
300 CAPSULE ORAL 2 TIMES DAILY
COMMUNITY
End: 2018-07-26 | Stop reason: ALTCHOICE

## 2018-06-04 RX ORDER — SODIUM CHLORIDE 0.9 % (FLUSH) 0.9 %
10 SYRINGE (ML) INJECTION EVERY 12 HOURS SCHEDULED
Status: DISCONTINUED | OUTPATIENT
Start: 2018-06-05 | End: 2018-06-07 | Stop reason: HOSPADM

## 2018-06-04 RX ORDER — ASPIRIN 81 MG/1
243 TABLET, CHEWABLE ORAL ONCE
Status: COMPLETED | OUTPATIENT
Start: 2018-06-04 | End: 2018-06-04

## 2018-06-04 RX ORDER — DULOXETIN HYDROCHLORIDE 60 MG/1
60 CAPSULE, DELAYED RELEASE ORAL DAILY
Status: DISCONTINUED | OUTPATIENT
Start: 2018-06-05 | End: 2018-06-07 | Stop reason: HOSPADM

## 2018-06-04 RX ORDER — MONTELUKAST SODIUM 10 MG/1
10 TABLET ORAL NIGHTLY
COMMUNITY
End: 2019-06-26 | Stop reason: SDUPTHER

## 2018-06-04 RX ORDER — METHYLPREDNISOLONE SODIUM SUCCINATE 125 MG/2ML
125 INJECTION, POWDER, LYOPHILIZED, FOR SOLUTION INTRAMUSCULAR; INTRAVENOUS ONCE
Status: COMPLETED | OUTPATIENT
Start: 2018-06-04 | End: 2018-06-04

## 2018-06-04 RX ORDER — DEXTROSE MONOHYDRATE 50 MG/ML
100 INJECTION, SOLUTION INTRAVENOUS PRN
Status: DISCONTINUED | OUTPATIENT
Start: 2018-06-04 | End: 2018-06-07 | Stop reason: HOSPADM

## 2018-06-04 RX ORDER — NITROGLYCERIN 0.4 MG/1
0.4 TABLET SUBLINGUAL EVERY 5 MIN PRN
Status: COMPLETED | OUTPATIENT
Start: 2018-06-04 | End: 2018-06-05

## 2018-06-04 RX ORDER — LUBIPROSTONE 8 UG/1
8 CAPSULE, GELATIN COATED ORAL 2 TIMES DAILY WITH MEALS
Status: DISCONTINUED | OUTPATIENT
Start: 2018-06-05 | End: 2018-06-07 | Stop reason: HOSPADM

## 2018-06-04 RX ORDER — ONDANSETRON 2 MG/ML
4 INJECTION INTRAMUSCULAR; INTRAVENOUS EVERY 6 HOURS PRN
Status: DISCONTINUED | OUTPATIENT
Start: 2018-06-04 | End: 2018-06-07 | Stop reason: HOSPADM

## 2018-06-04 RX ORDER — GABAPENTIN 300 MG/1
600 CAPSULE ORAL 3 TIMES DAILY
Status: DISCONTINUED | OUTPATIENT
Start: 2018-06-05 | End: 2018-06-07 | Stop reason: HOSPADM

## 2018-06-04 RX ORDER — MONTELUKAST SODIUM 10 MG/1
10 TABLET ORAL DAILY
Status: DISCONTINUED | OUTPATIENT
Start: 2018-06-05 | End: 2018-06-07 | Stop reason: HOSPADM

## 2018-06-04 RX ORDER — HYDRALAZINE HYDROCHLORIDE 50 MG/1
100 TABLET, FILM COATED ORAL 3 TIMES DAILY
Status: DISCONTINUED | OUTPATIENT
Start: 2018-06-04 | End: 2018-06-07 | Stop reason: HOSPADM

## 2018-06-04 RX ORDER — ASPIRIN 81 MG/1
81 TABLET, CHEWABLE ORAL DAILY
Status: DISCONTINUED | OUTPATIENT
Start: 2018-06-05 | End: 2018-06-07 | Stop reason: HOSPADM

## 2018-06-04 RX ORDER — SODIUM CHLORIDE 0.9 % (FLUSH) 0.9 %
10 SYRINGE (ML) INJECTION EVERY 12 HOURS SCHEDULED
Status: DISCONTINUED | OUTPATIENT
Start: 2018-06-05 | End: 2018-06-04 | Stop reason: SDUPTHER

## 2018-06-04 RX ORDER — URSODIOL 300 MG/1
300 CAPSULE ORAL 2 TIMES DAILY
Status: DISCONTINUED | OUTPATIENT
Start: 2018-06-05 | End: 2018-06-07 | Stop reason: HOSPADM

## 2018-06-04 RX ORDER — IPRATROPIUM BROMIDE AND ALBUTEROL SULFATE 2.5; .5 MG/3ML; MG/3ML
3 SOLUTION RESPIRATORY (INHALATION) EVERY 4 HOURS PRN
Status: DISCONTINUED | OUTPATIENT
Start: 2018-06-04 | End: 2018-06-07 | Stop reason: HOSPADM

## 2018-06-04 RX ORDER — SODIUM CHLORIDE 0.9 % (FLUSH) 0.9 %
10 SYRINGE (ML) INJECTION PRN
Status: DISCONTINUED | OUTPATIENT
Start: 2018-06-04 | End: 2018-06-07 | Stop reason: HOSPADM

## 2018-06-04 RX ORDER — CLONIDINE HYDROCHLORIDE 0.1 MG/1
TABLET ORAL
Status: COMPLETED
Start: 2018-06-04 | End: 2018-06-04

## 2018-06-04 RX ORDER — SODIUM CHLORIDE 0.9 % (FLUSH) 0.9 %
SYRINGE (ML) INJECTION
Status: COMPLETED
Start: 2018-06-04 | End: 2018-06-04

## 2018-06-04 RX ORDER — LIDOCAINE 5 G/100G
CREAM RECTAL; TOPICAL 3 TIMES DAILY PRN
COMMUNITY
End: 2018-07-26 | Stop reason: ALTCHOICE

## 2018-06-04 RX ORDER — BUMETANIDE 0.25 MG/ML
1 INJECTION, SOLUTION INTRAMUSCULAR; INTRAVENOUS ONCE
Status: COMPLETED | OUTPATIENT
Start: 2018-06-04 | End: 2018-06-04

## 2018-06-04 RX ORDER — DEXTROSE MONOHYDRATE 25 G/50ML
12.5 INJECTION, SOLUTION INTRAVENOUS PRN
Status: DISCONTINUED | OUTPATIENT
Start: 2018-06-04 | End: 2018-06-07 | Stop reason: HOSPADM

## 2018-06-04 RX ORDER — ACETAMINOPHEN 325 MG/1
650 TABLET ORAL EVERY 4 HOURS PRN
Status: DISCONTINUED | OUTPATIENT
Start: 2018-06-04 | End: 2018-06-07 | Stop reason: HOSPADM

## 2018-06-04 RX ORDER — FAMOTIDINE 20 MG/1
20 TABLET, FILM COATED ORAL 2 TIMES DAILY
Status: DISCONTINUED | OUTPATIENT
Start: 2018-06-05 | End: 2018-06-07 | Stop reason: HOSPADM

## 2018-06-04 RX ORDER — TRAZODONE HYDROCHLORIDE 50 MG/1
100 TABLET ORAL NIGHTLY
Status: DISCONTINUED | OUTPATIENT
Start: 2018-06-05 | End: 2018-06-05

## 2018-06-04 RX ORDER — FENTANYL CITRATE 50 UG/ML
25 INJECTION, SOLUTION INTRAMUSCULAR; INTRAVENOUS EVERY 4 HOURS PRN
Status: DISCONTINUED | OUTPATIENT
Start: 2018-06-04 | End: 2018-06-07 | Stop reason: HOSPADM

## 2018-06-04 RX ORDER — LABETALOL HYDROCHLORIDE 5 MG/ML
5 INJECTION, SOLUTION INTRAVENOUS ONCE
Status: COMPLETED | OUTPATIENT
Start: 2018-06-04 | End: 2018-06-04

## 2018-06-04 RX ORDER — SODIUM CHLORIDE 0.9 % (FLUSH) 0.9 %
10 SYRINGE (ML) INJECTION PRN
Status: DISCONTINUED | OUTPATIENT
Start: 2018-06-04 | End: 2018-06-04 | Stop reason: SDUPTHER

## 2018-06-04 RX ORDER — ASPIRIN 81 MG/1
1 TABLET, CHEWABLE ORAL DAILY
Status: DISCONTINUED | OUTPATIENT
Start: 2018-06-05 | End: 2018-06-04 | Stop reason: SDUPTHER

## 2018-06-04 RX ORDER — FLUTICASONE FUROATE AND VILANTEROL 200; 25 UG/1; UG/1
1 POWDER RESPIRATORY (INHALATION) DAILY
Status: DISCONTINUED | OUTPATIENT
Start: 2018-06-05 | End: 2018-06-07 | Stop reason: HOSPADM

## 2018-06-04 RX ADMIN — METHYLPREDNISOLONE SODIUM SUCCINATE 125 MG: 125 INJECTION, POWDER, FOR SOLUTION INTRAMUSCULAR; INTRAVENOUS at 20:56

## 2018-06-04 RX ADMIN — IPRATROPIUM BROMIDE AND ALBUTEROL SULFATE 1 AMPULE: .5; 3 SOLUTION RESPIRATORY (INHALATION) at 19:22

## 2018-06-04 RX ADMIN — LABETALOL HYDROCHLORIDE 5 MG: 5 INJECTION INTRAVENOUS at 20:57

## 2018-06-04 RX ADMIN — Medication 10 ML: at 20:57

## 2018-06-04 RX ADMIN — CLONIDINE HYDROCHLORIDE 0.2 MG: 0.1 TABLET ORAL at 22:03

## 2018-06-04 RX ADMIN — ASPIRIN 81 MG 243 MG: 81 TABLET ORAL at 20:53

## 2018-06-04 RX ADMIN — IPRATROPIUM BROMIDE AND ALBUTEROL SULFATE 1 AMPULE: .5; 3 SOLUTION RESPIRATORY (INHALATION) at 19:26

## 2018-06-04 RX ADMIN — BUMETANIDE 1 MG: 0.25 INJECTION INTRAMUSCULAR; INTRAVENOUS at 22:04

## 2018-06-04 RX ADMIN — FENTANYL CITRATE 50 MCG: 50 INJECTION, SOLUTION INTRAMUSCULAR; INTRAVENOUS at 20:57

## 2018-06-04 RX ADMIN — NITROGLYCERIN 0.5 INCH: 20 OINTMENT TOPICAL at 20:54

## 2018-06-04 RX ADMIN — ENOXAPARIN SODIUM 100 MG: 100 INJECTION SUBCUTANEOUS at 20:55

## 2018-06-04 ASSESSMENT — PAIN DESCRIPTION - DESCRIPTORS
DESCRIPTORS: ACHING
DESCRIPTORS: CONSTANT;DISCOMFORT;DULL

## 2018-06-04 ASSESSMENT — PAIN DESCRIPTION - PAIN TYPE
TYPE: ACUTE PAIN
TYPE: ACUTE PAIN
TYPE: CHRONIC PAIN

## 2018-06-04 ASSESSMENT — PAIN DESCRIPTION - LOCATION
LOCATION: BACK;LEG
LOCATION: BACK

## 2018-06-04 ASSESSMENT — PAIN DESCRIPTION - ORIENTATION
ORIENTATION: LOWER
ORIENTATION: LEFT;RIGHT;MID

## 2018-06-04 ASSESSMENT — PAIN SCALES - GENERAL
PAINLEVEL_OUTOF10: 9
PAINLEVEL_OUTOF10: 4
PAINLEVEL_OUTOF10: 9
PAINLEVEL_OUTOF10: 4

## 2018-06-04 ASSESSMENT — PAIN DESCRIPTION - PROGRESSION: CLINICAL_PROGRESSION: NOT CHANGED

## 2018-06-04 ASSESSMENT — PAIN DESCRIPTION - ONSET: ONSET: GRADUAL

## 2018-06-04 ASSESSMENT — PAIN DESCRIPTION - FREQUENCY: FREQUENCY: CONTINUOUS

## 2018-06-05 ENCOUNTER — TELEPHONE (OUTPATIENT)
Dept: ORTHOPEDIC SURGERY | Age: 63
End: 2018-06-05

## 2018-06-05 ENCOUNTER — APPOINTMENT (OUTPATIENT)
Dept: NUCLEAR MEDICINE | Age: 63
DRG: 281 | End: 2018-06-05
Payer: MEDICARE

## 2018-06-05 LAB
APTT: 47.6 SEC (ref 24.5–35.1)
APTT: 51.4 SEC (ref 24.5–35.1)
CHOLESTEROL, TOTAL: 222 MG/DL (ref 0–199)
FILM ARRAY ADENOVIRUS: NORMAL
FILM ARRAY BORDETELLA PERTUSSIS: NORMAL
FILM ARRAY CHLAMYDOPHILIA PNEUMONIAE: NORMAL
FILM ARRAY CORONAVIRUS 229E: NORMAL
FILM ARRAY CORONAVIRUS HKU1: NORMAL
FILM ARRAY CORONAVIRUS NL63: NORMAL
FILM ARRAY CORONAVIRUS OC43: NORMAL
FILM ARRAY INFLUENZA A VIRUS 09H1: NORMAL
FILM ARRAY INFLUENZA A VIRUS H1: NORMAL
FILM ARRAY INFLUENZA A VIRUS H3: NORMAL
FILM ARRAY INFLUENZA A VIRUS: NORMAL
FILM ARRAY INFLUENZA B: NORMAL
FILM ARRAY METAPNEUMOVIRUS: NORMAL
FILM ARRAY MYCOPLASMA PNEUMONIAE: NORMAL
FILM ARRAY PARAINFLUENZA VIRUS 1: NORMAL
FILM ARRAY PARAINFLUENZA VIRUS 2: NORMAL
FILM ARRAY PARAINFLUENZA VIRUS 3: NORMAL
FILM ARRAY PARAINFLUENZA VIRUS 4: NORMAL
FILM ARRAY RESPIRATORY SYNCITIAL VIRUS: NORMAL
FILM ARRAY RHINOVIRUS/ENTEROVIRUS: NORMAL
HCT VFR BLD CALC: 44.9 % (ref 34–48)
HDLC SERPL-MCNC: 38 MG/DL
HEMOGLOBIN: 14.1 G/DL (ref 11.5–15.5)
LDL CHOLESTEROL CALCULATED: 167 MG/DL (ref 0–99)
LV EF: 42 %
LV EF: 65 %
LVEF MODALITY: NORMAL
LVEF MODALITY: NORMAL
MCH RBC QN AUTO: 31.5 PG (ref 26–35)
MCHC RBC AUTO-ENTMCNC: 31.4 % (ref 32–34.5)
MCV RBC AUTO: 100.2 FL (ref 80–99.9)
METER GLUCOSE: 226 MG/DL (ref 70–110)
METER GLUCOSE: 227 MG/DL (ref 70–110)
METER GLUCOSE: 250 MG/DL (ref 70–110)
METER GLUCOSE: 252 MG/DL (ref 70–110)
PDW BLD-RTO: 13 FL (ref 11.5–15)
PLATELET # BLD: 274 E9/L (ref 130–450)
PMV BLD AUTO: 10.7 FL (ref 7–12)
RBC # BLD: 4.48 E12/L (ref 3.5–5.5)
TRIGL SERPL-MCNC: 84 MG/DL (ref 0–149)
TROPONIN: 0.32 NG/ML (ref 0–0.03)
TROPONIN: 0.35 NG/ML (ref 0–0.03)
TROPONIN: 0.48 NG/ML (ref 0–0.03)
VLDLC SERPL CALC-MCNC: 17 MG/DL
WBC # BLD: 9.2 E9/L (ref 4.5–11.5)

## 2018-06-05 PROCEDURE — 6360000002 HC RX W HCPCS: Performed by: INTERNAL MEDICINE

## 2018-06-05 PROCEDURE — 2580000003 HC RX 258: Performed by: INTERNAL MEDICINE

## 2018-06-05 PROCEDURE — 6360000004 HC RX CONTRAST MEDICATION: Performed by: NURSE PRACTITIONER

## 2018-06-05 PROCEDURE — 93005 ELECTROCARDIOGRAM TRACING: CPT | Performed by: INTERNAL MEDICINE

## 2018-06-05 PROCEDURE — 6360000002 HC RX W HCPCS: Performed by: NURSE PRACTITIONER

## 2018-06-05 PROCEDURE — 86140 C-REACTIVE PROTEIN: CPT

## 2018-06-05 PROCEDURE — 6370000000 HC RX 637 (ALT 250 FOR IP): Performed by: INTERNAL MEDICINE

## 2018-06-05 PROCEDURE — 2500000003 HC RX 250 WO HCPCS: Performed by: INTERNAL MEDICINE

## 2018-06-05 PROCEDURE — 85027 COMPLETE CBC AUTOMATED: CPT

## 2018-06-05 PROCEDURE — 36415 COLL VENOUS BLD VENIPUNCTURE: CPT

## 2018-06-05 PROCEDURE — 80061 LIPID PANEL: CPT

## 2018-06-05 PROCEDURE — 94640 AIRWAY INHALATION TREATMENT: CPT

## 2018-06-05 PROCEDURE — 2060000000 HC ICU INTERMEDIATE R&B

## 2018-06-05 PROCEDURE — 87486 CHLMYD PNEUM DNA AMP PROBE: CPT

## 2018-06-05 PROCEDURE — 87798 DETECT AGENT NOS DNA AMP: CPT

## 2018-06-05 PROCEDURE — 87581 M.PNEUMON DNA AMP PROBE: CPT

## 2018-06-05 PROCEDURE — A9500 TC99M SESTAMIBI: HCPCS | Performed by: RADIOLOGY

## 2018-06-05 PROCEDURE — 93017 CV STRESS TEST TRACING ONLY: CPT

## 2018-06-05 PROCEDURE — 78452 HT MUSCLE IMAGE SPECT MULT: CPT

## 2018-06-05 PROCEDURE — 85730 THROMBOPLASTIN TIME PARTIAL: CPT

## 2018-06-05 PROCEDURE — 2700000000 HC OXYGEN THERAPY PER DAY

## 2018-06-05 PROCEDURE — 6370000000 HC RX 637 (ALT 250 FOR IP)

## 2018-06-05 PROCEDURE — 94660 CPAP INITIATION&MGMT: CPT

## 2018-06-05 PROCEDURE — 93306 TTE W/DOPPLER COMPLETE: CPT

## 2018-06-05 PROCEDURE — 6370000000 HC RX 637 (ALT 250 FOR IP): Performed by: NURSE PRACTITIONER

## 2018-06-05 PROCEDURE — 87503 INFLUENZA DNA AMP PROB ADDL: CPT

## 2018-06-05 PROCEDURE — 84484 ASSAY OF TROPONIN QUANT: CPT

## 2018-06-05 PROCEDURE — 87502 INFLUENZA DNA AMP PROBE: CPT

## 2018-06-05 PROCEDURE — 99223 1ST HOSP IP/OBS HIGH 75: CPT | Performed by: INTERNAL MEDICINE

## 2018-06-05 PROCEDURE — 3430000000 HC RX DIAGNOSTIC RADIOPHARMACEUTICAL: Performed by: RADIOLOGY

## 2018-06-05 PROCEDURE — 85651 RBC SED RATE NONAUTOMATED: CPT

## 2018-06-05 PROCEDURE — 82962 GLUCOSE BLOOD TEST: CPT

## 2018-06-05 RX ORDER — BUMETANIDE 0.25 MG/ML
1 INJECTION, SOLUTION INTRAMUSCULAR; INTRAVENOUS ONCE
Status: COMPLETED | OUTPATIENT
Start: 2018-06-05 | End: 2018-06-05

## 2018-06-05 RX ORDER — FENTANYL CITRATE 50 UG/ML
25 INJECTION, SOLUTION INTRAMUSCULAR; INTRAVENOUS ONCE
Status: COMPLETED | OUTPATIENT
Start: 2018-06-05 | End: 2018-06-05

## 2018-06-05 RX ORDER — HEPARIN SODIUM 1000 [USP'U]/ML
60 INJECTION, SOLUTION INTRAVENOUS; SUBCUTANEOUS PRN
Status: DISCONTINUED | OUTPATIENT
Start: 2018-06-05 | End: 2018-06-06

## 2018-06-05 RX ORDER — HEPARIN SODIUM 10000 [USP'U]/100ML
12 INJECTION, SOLUTION INTRAVENOUS CONTINUOUS
Status: DISCONTINUED | OUTPATIENT
Start: 2018-06-05 | End: 2018-06-06

## 2018-06-05 RX ORDER — HEPARIN SODIUM 1000 [USP'U]/ML
30 INJECTION, SOLUTION INTRAVENOUS; SUBCUTANEOUS PRN
Status: DISCONTINUED | OUTPATIENT
Start: 2018-06-05 | End: 2018-06-06

## 2018-06-05 RX ORDER — ROSUVASTATIN CALCIUM 10 MG/1
10 TABLET, COATED ORAL NIGHTLY
Status: DISCONTINUED | OUTPATIENT
Start: 2018-06-05 | End: 2018-06-07 | Stop reason: HOSPADM

## 2018-06-05 RX ORDER — INSULIN GLARGINE 100 [IU]/ML
92 INJECTION, SOLUTION SUBCUTANEOUS 2 TIMES DAILY
Status: DISCONTINUED | OUTPATIENT
Start: 2018-06-05 | End: 2018-06-06

## 2018-06-05 RX ADMIN — TIOTROPIUM BROMIDE 18 MCG: 18 CAPSULE ORAL; RESPIRATORY (INHALATION) at 09:58

## 2018-06-05 RX ADMIN — HYDRALAZINE HYDROCHLORIDE 100 MG: 50 TABLET ORAL at 01:27

## 2018-06-05 RX ADMIN — SACUBITRIL AND VALSARTAN 1 TABLET: 24; 26 TABLET, FILM COATED ORAL at 01:27

## 2018-06-05 RX ADMIN — URSODIOL 300 MG: 300 CAPSULE ORAL at 21:19

## 2018-06-05 RX ADMIN — GABAPENTIN 600 MG: 300 CAPSULE ORAL at 21:18

## 2018-06-05 RX ADMIN — DULOXETINE HYDROCHLORIDE 60 MG: 60 CAPSULE, DELAYED RELEASE ORAL at 14:45

## 2018-06-05 RX ADMIN — PERFLUTREN 1.65 MG: 6.52 INJECTION, SUSPENSION INTRAVENOUS at 13:46

## 2018-06-05 RX ADMIN — Medication 30 MILLICURIE: at 13:55

## 2018-06-05 RX ADMIN — INSULIN LISPRO 36 UNITS: 100 INJECTION, SOLUTION INTRAVENOUS; SUBCUTANEOUS at 16:29

## 2018-06-05 RX ADMIN — NITROGLYCERIN 0.4 MG: 0.4 TABLET SUBLINGUAL at 21:47

## 2018-06-05 RX ADMIN — HYDRALAZINE HYDROCHLORIDE 100 MG: 50 TABLET ORAL at 21:20

## 2018-06-05 RX ADMIN — LUBIPROSTONE 8 MCG: 8 CAPSULE, GELATIN COATED ORAL at 16:29

## 2018-06-05 RX ADMIN — MONTELUKAST SODIUM 10 MG: 10 TABLET, FILM COATED ORAL at 14:45

## 2018-06-05 RX ADMIN — METOPROLOL SUCCINATE 75 MG: 50 TABLET, FILM COATED, EXTENDED RELEASE ORAL at 03:48

## 2018-06-05 RX ADMIN — Medication 10 ML: at 21:30

## 2018-06-05 RX ADMIN — FENTANYL CITRATE 25 MCG: 50 INJECTION, SOLUTION INTRAMUSCULAR; INTRAVENOUS at 22:38

## 2018-06-05 RX ADMIN — SACUBITRIL AND VALSARTAN 1 TABLET: 24; 26 TABLET, FILM COATED ORAL at 21:19

## 2018-06-05 RX ADMIN — CETIRIZINE HYDROCHLORIDE 10 MG: 10 TABLET, FILM COATED ORAL at 14:43

## 2018-06-05 RX ADMIN — GABAPENTIN 600 MG: 300 CAPSULE ORAL at 14:44

## 2018-06-05 RX ADMIN — GABAPENTIN 600 MG: 300 CAPSULE ORAL at 01:27

## 2018-06-05 RX ADMIN — URSODIOL 300 MG: 300 CAPSULE ORAL at 14:45

## 2018-06-05 RX ADMIN — Medication 10 MILLICURIE: at 13:55

## 2018-06-05 RX ADMIN — INSULIN GLARGINE 92 UNITS: 100 INJECTION, SOLUTION SUBCUTANEOUS at 21:21

## 2018-06-05 RX ADMIN — HYDRALAZINE HYDROCHLORIDE 100 MG: 50 TABLET ORAL at 16:29

## 2018-06-05 RX ADMIN — URSODIOL 300 MG: 300 CAPSULE ORAL at 01:27

## 2018-06-05 RX ADMIN — FAMOTIDINE 20 MG: 20 TABLET ORAL at 21:19

## 2018-06-05 RX ADMIN — FAMOTIDINE 20 MG: 20 TABLET ORAL at 14:44

## 2018-06-05 RX ADMIN — NITROGLYCERIN 0.4 MG: 0.4 TABLET SUBLINGUAL at 21:37

## 2018-06-05 RX ADMIN — ASPIRIN 81 MG 81 MG: 81 TABLET ORAL at 14:44

## 2018-06-05 RX ADMIN — REGADENOSON 0.4 MG: 0.08 INJECTION, SOLUTION INTRAVENOUS at 13:09

## 2018-06-05 RX ADMIN — ACETAMINOPHEN 650 MG: 325 TABLET ORAL at 14:51

## 2018-06-05 RX ADMIN — SACUBITRIL AND VALSARTAN 1 TABLET: 24; 26 TABLET, FILM COATED ORAL at 14:45

## 2018-06-05 RX ADMIN — IPRATROPIUM BROMIDE AND ALBUTEROL SULFATE 3 ML: .5; 3 SOLUTION RESPIRATORY (INHALATION) at 10:00

## 2018-06-05 RX ADMIN — HEPARIN SODIUM AND DEXTROSE 12 UNITS/KG/HR: 10000; 5 INJECTION INTRAVENOUS at 18:49

## 2018-06-05 RX ADMIN — BUMETANIDE 1 MG: 0.25 INJECTION INTRAMUSCULAR; INTRAVENOUS at 14:51

## 2018-06-05 RX ADMIN — ISOSORBIDE MONONITRATE 60 MG: 60 TABLET ORAL at 14:44

## 2018-06-05 RX ADMIN — HEPARIN SODIUM AND DEXTROSE 12 UNITS/KG/HR: 10000; 5 INJECTION INTRAVENOUS at 02:12

## 2018-06-05 RX ADMIN — FAMOTIDINE 20 MG: 20 TABLET ORAL at 01:27

## 2018-06-05 RX ADMIN — METOPROLOL SUCCINATE 75 MG: 50 TABLET, FILM COATED, EXTENDED RELEASE ORAL at 14:51

## 2018-06-05 RX ADMIN — ROSUVASTATIN CALCIUM 10 MG: 10 TABLET, FILM COATED ORAL at 21:19

## 2018-06-05 RX ADMIN — HYDRALAZINE HYDROCHLORIDE 100 MG: 50 TABLET ORAL at 06:37

## 2018-06-05 RX ADMIN — NITROGLYCERIN 0.4 MG: 0.4 TABLET SUBLINGUAL at 18:48

## 2018-06-05 ASSESSMENT — PAIN DESCRIPTION - FREQUENCY
FREQUENCY: CONTINUOUS

## 2018-06-05 ASSESSMENT — PAIN DESCRIPTION - ONSET
ONSET: ON-GOING
ONSET: PROGRESSIVE
ONSET: ON-GOING

## 2018-06-05 ASSESSMENT — PAIN DESCRIPTION - LOCATION
LOCATION: BACK;LEG
LOCATION: CHEST
LOCATION: LEG
LOCATION: CHEST

## 2018-06-05 ASSESSMENT — PAIN DESCRIPTION - ORIENTATION
ORIENTATION: RIGHT;LEFT
ORIENTATION: RIGHT
ORIENTATION: RIGHT;LEFT;LOWER
ORIENTATION: RIGHT

## 2018-06-05 ASSESSMENT — PAIN DESCRIPTION - PROGRESSION
CLINICAL_PROGRESSION: NOT CHANGED
CLINICAL_PROGRESSION: GRADUALLY WORSENING
CLINICAL_PROGRESSION: NOT CHANGED
CLINICAL_PROGRESSION: GRADUALLY IMPROVING

## 2018-06-05 ASSESSMENT — PAIN DESCRIPTION - PAIN TYPE
TYPE: ACUTE PAIN
TYPE: CHRONIC PAIN
TYPE: ACUTE PAIN
TYPE: CHRONIC PAIN
TYPE: ACUTE PAIN

## 2018-06-05 ASSESSMENT — PAIN DESCRIPTION - DESCRIPTORS
DESCRIPTORS: DISCOMFORT;DULL
DESCRIPTORS: DISCOMFORT;ACHING
DESCRIPTORS: DISCOMFORT;HEAVINESS
DESCRIPTORS: DISCOMFORT;RADIATING

## 2018-06-05 ASSESSMENT — PAIN SCALES - GENERAL
PAINLEVEL_OUTOF10: 0
PAINLEVEL_OUTOF10: 9
PAINLEVEL_OUTOF10: 8
PAINLEVEL_OUTOF10: 10
PAINLEVEL_OUTOF10: 7
PAINLEVEL_OUTOF10: 3

## 2018-06-06 LAB
ANION GAP SERPL CALCULATED.3IONS-SCNC: 10 MMOL/L (ref 7–16)
APTT: 42.1 SEC (ref 24.5–35.1)
BUN BLDV-MCNC: 18 MG/DL (ref 8–23)
C-REACTIVE PROTEIN: 0.8 MG/DL (ref 0–0.4)
CALCIUM SERPL-MCNC: 9.4 MG/DL (ref 8.6–10.2)
CHLORIDE BLD-SCNC: 99 MMOL/L (ref 98–107)
CO2: 28 MMOL/L (ref 22–29)
CREAT SERPL-MCNC: 1 MG/DL (ref 0.5–1)
GFR AFRICAN AMERICAN: >60
GFR NON-AFRICAN AMERICAN: >60 ML/MIN/1.73
GLUCOSE BLD-MCNC: 116 MG/DL (ref 74–109)
HCT VFR BLD CALC: 44.6 % (ref 34–48)
HEMOGLOBIN: 13.9 G/DL (ref 11.5–15.5)
MCH RBC QN AUTO: 31.2 PG (ref 26–35)
MCHC RBC AUTO-ENTMCNC: 31.2 % (ref 32–34.5)
MCV RBC AUTO: 100 FL (ref 80–99.9)
METER GLUCOSE: 140 MG/DL (ref 70–110)
METER GLUCOSE: 186 MG/DL (ref 70–110)
METER GLUCOSE: 215 MG/DL (ref 70–110)
METER GLUCOSE: 69 MG/DL (ref 70–110)
METER GLUCOSE: 70 MG/DL (ref 70–110)
METER GLUCOSE: 84 MG/DL (ref 70–110)
PDW BLD-RTO: 13.2 FL (ref 11.5–15)
PLATELET # BLD: 303 E9/L (ref 130–450)
PMV BLD AUTO: 11.1 FL (ref 7–12)
POTASSIUM SERPL-SCNC: 4 MMOL/L (ref 3.5–5)
PROCALCITONIN: 0.03 NG/ML (ref 0–0.08)
RBC # BLD: 4.46 E12/L (ref 3.5–5.5)
SEDIMENTATION RATE, ERYTHROCYTE: 30 MM/HR (ref 0–20)
SODIUM BLD-SCNC: 137 MMOL/L (ref 132–146)
WBC # BLD: 9.5 E9/L (ref 4.5–11.5)

## 2018-06-06 PROCEDURE — G8987 SELF CARE CURRENT STATUS: HCPCS

## 2018-06-06 PROCEDURE — 94660 CPAP INITIATION&MGMT: CPT

## 2018-06-06 PROCEDURE — 6360000002 HC RX W HCPCS: Performed by: INTERNAL MEDICINE

## 2018-06-06 PROCEDURE — 94640 AIRWAY INHALATION TREATMENT: CPT

## 2018-06-06 PROCEDURE — 82962 GLUCOSE BLOOD TEST: CPT

## 2018-06-06 PROCEDURE — 84145 PROCALCITONIN (PCT): CPT

## 2018-06-06 PROCEDURE — 2700000000 HC OXYGEN THERAPY PER DAY

## 2018-06-06 PROCEDURE — 6370000000 HC RX 637 (ALT 250 FOR IP): Performed by: INTERNAL MEDICINE

## 2018-06-06 PROCEDURE — 2500000003 HC RX 250 WO HCPCS: Performed by: INTERNAL MEDICINE

## 2018-06-06 PROCEDURE — G8988 SELF CARE GOAL STATUS: HCPCS

## 2018-06-06 PROCEDURE — 6370000000 HC RX 637 (ALT 250 FOR IP): Performed by: NURSE PRACTITIONER

## 2018-06-06 PROCEDURE — 36415 COLL VENOUS BLD VENIPUNCTURE: CPT

## 2018-06-06 PROCEDURE — 97165 OT EVAL LOW COMPLEX 30 MIN: CPT

## 2018-06-06 PROCEDURE — 80048 BASIC METABOLIC PNL TOTAL CA: CPT

## 2018-06-06 PROCEDURE — 2580000003 HC RX 258: Performed by: INTERNAL MEDICINE

## 2018-06-06 PROCEDURE — 99232 SBSQ HOSP IP/OBS MODERATE 35: CPT | Performed by: INTERNAL MEDICINE

## 2018-06-06 PROCEDURE — 85027 COMPLETE CBC AUTOMATED: CPT

## 2018-06-06 PROCEDURE — 85730 THROMBOPLASTIN TIME PARTIAL: CPT

## 2018-06-06 PROCEDURE — 97161 PT EVAL LOW COMPLEX 20 MIN: CPT

## 2018-06-06 PROCEDURE — 2060000000 HC ICU INTERMEDIATE R&B

## 2018-06-06 RX ORDER — PREDNISONE 20 MG/1
20 TABLET ORAL DAILY
Status: DISCONTINUED | OUTPATIENT
Start: 2018-06-06 | End: 2018-06-07

## 2018-06-06 RX ORDER — BUMETANIDE 0.25 MG/ML
1 INJECTION, SOLUTION INTRAMUSCULAR; INTRAVENOUS EVERY 12 HOURS
Status: DISCONTINUED | OUTPATIENT
Start: 2018-06-06 | End: 2018-06-07 | Stop reason: HOSPADM

## 2018-06-06 RX ORDER — INSULIN GLARGINE 100 [IU]/ML
80 INJECTION, SOLUTION SUBCUTANEOUS 2 TIMES DAILY
Status: DISCONTINUED | OUTPATIENT
Start: 2018-06-06 | End: 2018-06-07 | Stop reason: HOSPADM

## 2018-06-06 RX ADMIN — SACUBITRIL AND VALSARTAN 1 TABLET: 24; 26 TABLET, FILM COATED ORAL at 19:52

## 2018-06-06 RX ADMIN — HYDRALAZINE HYDROCHLORIDE 100 MG: 50 TABLET ORAL at 06:11

## 2018-06-06 RX ADMIN — MONTELUKAST SODIUM 10 MG: 10 TABLET, FILM COATED ORAL at 08:32

## 2018-06-06 RX ADMIN — INSULIN LISPRO 36 UNITS: 100 INJECTION, SOLUTION INTRAVENOUS; SUBCUTANEOUS at 08:33

## 2018-06-06 RX ADMIN — FENTANYL CITRATE 25 MCG: 50 INJECTION, SOLUTION INTRAMUSCULAR; INTRAVENOUS at 11:25

## 2018-06-06 RX ADMIN — METOPROLOL SUCCINATE 75 MG: 50 TABLET, FILM COATED, EXTENDED RELEASE ORAL at 00:19

## 2018-06-06 RX ADMIN — CETIRIZINE HYDROCHLORIDE 10 MG: 10 TABLET, FILM COATED ORAL at 08:32

## 2018-06-06 RX ADMIN — BUMETANIDE 1 MG: 0.25 INJECTION INTRAMUSCULAR; INTRAVENOUS at 12:36

## 2018-06-06 RX ADMIN — INSULIN LISPRO 15 UNITS: 100 INJECTION, SOLUTION INTRAVENOUS; SUBCUTANEOUS at 12:36

## 2018-06-06 RX ADMIN — GABAPENTIN 600 MG: 300 CAPSULE ORAL at 13:41

## 2018-06-06 RX ADMIN — ASPIRIN 81 MG 81 MG: 81 TABLET ORAL at 08:32

## 2018-06-06 RX ADMIN — INSULIN GLARGINE 92 UNITS: 100 INJECTION, SOLUTION SUBCUTANEOUS at 09:21

## 2018-06-06 RX ADMIN — PREDNISONE 20 MG: 20 TABLET ORAL at 15:46

## 2018-06-06 RX ADMIN — GABAPENTIN 600 MG: 300 CAPSULE ORAL at 08:33

## 2018-06-06 RX ADMIN — Medication 10 ML: at 19:56

## 2018-06-06 RX ADMIN — FAMOTIDINE 20 MG: 20 TABLET ORAL at 08:32

## 2018-06-06 RX ADMIN — INSULIN GLARGINE 80 UNITS: 100 INJECTION, SOLUTION SUBCUTANEOUS at 19:54

## 2018-06-06 RX ADMIN — GABAPENTIN 600 MG: 300 CAPSULE ORAL at 19:52

## 2018-06-06 RX ADMIN — FENTANYL CITRATE 25 MCG: 50 INJECTION, SOLUTION INTRAMUSCULAR; INTRAVENOUS at 04:38

## 2018-06-06 RX ADMIN — LUBIPROSTONE 8 MCG: 8 CAPSULE, GELATIN COATED ORAL at 16:59

## 2018-06-06 RX ADMIN — URSODIOL 300 MG: 300 CAPSULE ORAL at 19:52

## 2018-06-06 RX ADMIN — METOPROLOL SUCCINATE 75 MG: 50 TABLET, FILM COATED, EXTENDED RELEASE ORAL at 11:33

## 2018-06-06 RX ADMIN — ISOSORBIDE MONONITRATE 60 MG: 60 TABLET ORAL at 08:33

## 2018-06-06 RX ADMIN — FAMOTIDINE 20 MG: 20 TABLET ORAL at 19:52

## 2018-06-06 RX ADMIN — ACETAMINOPHEN 650 MG: 325 TABLET ORAL at 15:02

## 2018-06-06 RX ADMIN — TIOTROPIUM BROMIDE 18 MCG: 18 CAPSULE ORAL; RESPIRATORY (INHALATION) at 09:47

## 2018-06-06 RX ADMIN — HEPARIN SODIUM 3520 UNITS: 1000 INJECTION, SOLUTION INTRAVENOUS; SUBCUTANEOUS at 11:26

## 2018-06-06 RX ADMIN — FENTANYL CITRATE 25 MCG: 50 INJECTION, SOLUTION INTRAMUSCULAR; INTRAVENOUS at 22:54

## 2018-06-06 RX ADMIN — LUBIPROSTONE 8 MCG: 8 CAPSULE, GELATIN COATED ORAL at 08:32

## 2018-06-06 RX ADMIN — INSULIN LISPRO 3 UNITS: 100 INJECTION, SOLUTION INTRAVENOUS; SUBCUTANEOUS at 19:53

## 2018-06-06 RX ADMIN — ROSUVASTATIN CALCIUM 10 MG: 10 TABLET, FILM COATED ORAL at 19:52

## 2018-06-06 RX ADMIN — Medication 10 ML: at 11:30

## 2018-06-06 RX ADMIN — DULOXETINE HYDROCHLORIDE 60 MG: 60 CAPSULE, DELAYED RELEASE ORAL at 08:32

## 2018-06-06 RX ADMIN — SACUBITRIL AND VALSARTAN 1 TABLET: 24; 26 TABLET, FILM COATED ORAL at 08:32

## 2018-06-06 RX ADMIN — HYDRALAZINE HYDROCHLORIDE 100 MG: 50 TABLET ORAL at 13:41

## 2018-06-06 RX ADMIN — URSODIOL 300 MG: 300 CAPSULE ORAL at 08:32

## 2018-06-06 RX ADMIN — HYDRALAZINE HYDROCHLORIDE 100 MG: 50 TABLET ORAL at 22:03

## 2018-06-06 ASSESSMENT — PAIN DESCRIPTION - PAIN TYPE
TYPE: ACUTE PAIN
TYPE: CHRONIC PAIN
TYPE: CHRONIC PAIN

## 2018-06-06 ASSESSMENT — PAIN SCALES - GENERAL
PAINLEVEL_OUTOF10: 0
PAINLEVEL_OUTOF10: 10
PAINLEVEL_OUTOF10: 4
PAINLEVEL_OUTOF10: 9
PAINLEVEL_OUTOF10: 0
PAINLEVEL_OUTOF10: 9
PAINLEVEL_OUTOF10: 7
PAINLEVEL_OUTOF10: 8

## 2018-06-06 ASSESSMENT — PAIN DESCRIPTION - ONSET
ONSET: ON-GOING
ONSET: PROGRESSIVE
ONSET: ON-GOING
ONSET: ON-GOING
ONSET: PROGRESSIVE

## 2018-06-06 ASSESSMENT — PAIN DESCRIPTION - PROGRESSION
CLINICAL_PROGRESSION: GRADUALLY WORSENING
CLINICAL_PROGRESSION: NOT CHANGED

## 2018-06-06 ASSESSMENT — PAIN DESCRIPTION - FREQUENCY
FREQUENCY: INTERMITTENT
FREQUENCY: CONTINUOUS
FREQUENCY: CONTINUOUS
FREQUENCY: INTERMITTENT
FREQUENCY: CONTINUOUS

## 2018-06-06 ASSESSMENT — PAIN DESCRIPTION - ORIENTATION
ORIENTATION: LOWER;MID;LEFT;RIGHT
ORIENTATION: LEFT;LOWER;MID
ORIENTATION: MID;LOWER;LEFT;RIGHT
ORIENTATION: RIGHT

## 2018-06-06 ASSESSMENT — PAIN DESCRIPTION - LOCATION
LOCATION: BACK;HEAD
LOCATION: BACK;LEG
LOCATION: HEAD
LOCATION: LEG
LOCATION: ABDOMEN;LEG

## 2018-06-06 ASSESSMENT — PAIN DESCRIPTION - DESCRIPTORS
DESCRIPTORS: HEADACHE;DISCOMFORT
DESCRIPTORS: DISCOMFORT;CONSTANT;STABBING
DESCRIPTORS: DISCOMFORT;STABBING
DESCRIPTORS: HEADACHE
DESCRIPTORS: STABBING;DISCOMFORT

## 2018-06-07 VITALS
WEIGHT: 253.3 LBS | BODY MASS INDEX: 44.88 KG/M2 | DIASTOLIC BLOOD PRESSURE: 74 MMHG | HEART RATE: 70 BPM | SYSTOLIC BLOOD PRESSURE: 143 MMHG | HEIGHT: 63 IN | TEMPERATURE: 98.3 F | RESPIRATION RATE: 16 BRPM | OXYGEN SATURATION: 94 %

## 2018-06-07 LAB
EKG ATRIAL RATE: 62 BPM
EKG P AXIS: 53 DEGREES
EKG P-R INTERVAL: 168 MS
EKG Q-T INTERVAL: 456 MS
EKG QRS DURATION: 144 MS
EKG QTC CALCULATION (BAZETT): 462 MS
EKG R AXIS: 52 DEGREES
EKG T AXIS: -169 DEGREES
EKG VENTRICULAR RATE: 62 BPM
METER GLUCOSE: 247 MG/DL (ref 70–110)
METER GLUCOSE: 94 MG/DL (ref 70–110)
PLATELET # BLD: 298 E9/L (ref 130–450)

## 2018-06-07 PROCEDURE — 93010 ELECTROCARDIOGRAM REPORT: CPT | Performed by: INTERNAL MEDICINE

## 2018-06-07 PROCEDURE — 94660 CPAP INITIATION&MGMT: CPT

## 2018-06-07 PROCEDURE — 2700000000 HC OXYGEN THERAPY PER DAY

## 2018-06-07 PROCEDURE — 6370000000 HC RX 637 (ALT 250 FOR IP): Performed by: INTERNAL MEDICINE

## 2018-06-07 PROCEDURE — 99232 SBSQ HOSP IP/OBS MODERATE 35: CPT | Performed by: INTERNAL MEDICINE

## 2018-06-07 PROCEDURE — 6360000002 HC RX W HCPCS: Performed by: INTERNAL MEDICINE

## 2018-06-07 PROCEDURE — 82962 GLUCOSE BLOOD TEST: CPT

## 2018-06-07 PROCEDURE — 2580000003 HC RX 258: Performed by: INTERNAL MEDICINE

## 2018-06-07 PROCEDURE — 36415 COLL VENOUS BLD VENIPUNCTURE: CPT

## 2018-06-07 PROCEDURE — 94640 AIRWAY INHALATION TREATMENT: CPT

## 2018-06-07 PROCEDURE — 85049 AUTOMATED PLATELET COUNT: CPT

## 2018-06-07 PROCEDURE — 2500000003 HC RX 250 WO HCPCS: Performed by: INTERNAL MEDICINE

## 2018-06-07 RX ORDER — ROSUVASTATIN CALCIUM 10 MG/1
10 TABLET, COATED ORAL NIGHTLY
Qty: 30 TABLET | Refills: 0 | Status: SHIPPED | OUTPATIENT
Start: 2018-06-07 | End: 2018-08-08 | Stop reason: ALTCHOICE

## 2018-06-07 RX ORDER — HYDRALAZINE HYDROCHLORIDE 100 MG/1
100 TABLET, FILM COATED ORAL 3 TIMES DAILY
Qty: 90 TABLET | Refills: 0 | Status: SHIPPED | OUTPATIENT
Start: 2018-06-07 | End: 2018-09-18 | Stop reason: SDUPTHER

## 2018-06-07 RX ORDER — NAPROXEN 500 MG/1
500 TABLET ORAL 2 TIMES DAILY PRN
Qty: 30 TABLET | Refills: 0 | Status: ON HOLD | OUTPATIENT
Start: 2018-06-07 | End: 2018-07-30 | Stop reason: HOSPADM

## 2018-06-07 RX ADMIN — CETIRIZINE HYDROCHLORIDE 10 MG: 10 TABLET, FILM COATED ORAL at 08:37

## 2018-06-07 RX ADMIN — HYDRALAZINE HYDROCHLORIDE 100 MG: 50 TABLET ORAL at 05:16

## 2018-06-07 RX ADMIN — GABAPENTIN 600 MG: 300 CAPSULE ORAL at 08:36

## 2018-06-07 RX ADMIN — METOPROLOL SUCCINATE 75 MG: 50 TABLET, FILM COATED, EXTENDED RELEASE ORAL at 11:53

## 2018-06-07 RX ADMIN — FAMOTIDINE 20 MG: 20 TABLET ORAL at 08:37

## 2018-06-07 RX ADMIN — ACETAMINOPHEN 650 MG: 325 TABLET ORAL at 05:16

## 2018-06-07 RX ADMIN — BUMETANIDE 1 MG: 0.25 INJECTION INTRAMUSCULAR; INTRAVENOUS at 05:16

## 2018-06-07 RX ADMIN — LUBIPROSTONE 8 MCG: 8 CAPSULE, GELATIN COATED ORAL at 08:36

## 2018-06-07 RX ADMIN — INSULIN LISPRO 6 UNITS: 100 INJECTION, SOLUTION INTRAVENOUS; SUBCUTANEOUS at 11:55

## 2018-06-07 RX ADMIN — Medication 10 ML: at 08:37

## 2018-06-07 RX ADMIN — URSODIOL 300 MG: 300 CAPSULE ORAL at 08:36

## 2018-06-07 RX ADMIN — TIOTROPIUM BROMIDE 18 MCG: 18 CAPSULE ORAL; RESPIRATORY (INHALATION) at 08:24

## 2018-06-07 RX ADMIN — ASPIRIN 81 MG 81 MG: 81 TABLET ORAL at 08:36

## 2018-06-07 RX ADMIN — DULOXETINE HYDROCHLORIDE 60 MG: 60 CAPSULE, DELAYED RELEASE ORAL at 08:37

## 2018-06-07 RX ADMIN — ISOSORBIDE MONONITRATE 60 MG: 60 TABLET ORAL at 08:36

## 2018-06-07 RX ADMIN — HYDRALAZINE HYDROCHLORIDE 100 MG: 50 TABLET ORAL at 14:19

## 2018-06-07 RX ADMIN — METOPROLOL SUCCINATE 75 MG: 50 TABLET, FILM COATED, EXTENDED RELEASE ORAL at 00:05

## 2018-06-07 RX ADMIN — PREDNISONE 20 MG: 20 TABLET ORAL at 08:37

## 2018-06-07 RX ADMIN — FENTANYL CITRATE 25 MCG: 50 INJECTION, SOLUTION INTRAMUSCULAR; INTRAVENOUS at 11:53

## 2018-06-07 RX ADMIN — SACUBITRIL AND VALSARTAN 1 TABLET: 24; 26 TABLET, FILM COATED ORAL at 08:36

## 2018-06-07 RX ADMIN — GABAPENTIN 600 MG: 300 CAPSULE ORAL at 14:19

## 2018-06-07 RX ADMIN — INSULIN GLARGINE 80 UNITS: 100 INJECTION, SOLUTION SUBCUTANEOUS at 08:37

## 2018-06-07 RX ADMIN — MONTELUKAST SODIUM 10 MG: 10 TABLET, FILM COATED ORAL at 08:36

## 2018-06-07 ASSESSMENT — PAIN DESCRIPTION - ORIENTATION: ORIENTATION: LOWER

## 2018-06-07 ASSESSMENT — PAIN DESCRIPTION - PROGRESSION: CLINICAL_PROGRESSION: NOT CHANGED

## 2018-06-07 ASSESSMENT — PAIN SCALES - GENERAL
PAINLEVEL_OUTOF10: 5
PAINLEVEL_OUTOF10: 8
PAINLEVEL_OUTOF10: 9
PAINLEVEL_OUTOF10: 0
PAINLEVEL_OUTOF10: 0

## 2018-06-07 ASSESSMENT — PAIN DESCRIPTION - DESCRIPTORS: DESCRIPTORS: ACHING;SHARP;CONSTANT

## 2018-06-07 ASSESSMENT — PAIN DESCRIPTION - LOCATION: LOCATION: BACK

## 2018-06-07 ASSESSMENT — PAIN DESCRIPTION - PAIN TYPE: TYPE: CHRONIC PAIN

## 2018-06-08 ENCOUNTER — CARE COORDINATION (OUTPATIENT)
Dept: CARE COORDINATION | Age: 63
End: 2018-06-08

## 2018-06-11 ENCOUNTER — TELEPHONE (OUTPATIENT)
Dept: FAMILY MEDICINE CLINIC | Age: 63
End: 2018-06-11

## 2018-06-11 LAB
EKG ATRIAL RATE: 77 BPM
EKG ATRIAL RATE: 82 BPM
EKG P AXIS: 45 DEGREES
EKG P AXIS: 49 DEGREES
EKG P-R INTERVAL: 182 MS
EKG P-R INTERVAL: 198 MS
EKG Q-T INTERVAL: 458 MS
EKG Q-T INTERVAL: 466 MS
EKG QRS DURATION: 132 MS
EKG QRS DURATION: 140 MS
EKG QTC CALCULATION (BAZETT): 518 MS
EKG QTC CALCULATION (BAZETT): 544 MS
EKG R AXIS: 73 DEGREES
EKG R AXIS: 81 DEGREES
EKG T AXIS: 111 DEGREES
EKG T AXIS: 93 DEGREES
EKG VENTRICULAR RATE: 77 BPM
EKG VENTRICULAR RATE: 82 BPM

## 2018-06-12 ENCOUNTER — OFFICE VISIT (OUTPATIENT)
Dept: FAMILY MEDICINE CLINIC | Age: 63
End: 2018-06-12
Payer: MEDICARE

## 2018-06-12 VITALS
HEIGHT: 62 IN | RESPIRATION RATE: 20 BRPM | WEIGHT: 260 LBS | SYSTOLIC BLOOD PRESSURE: 138 MMHG | OXYGEN SATURATION: 94 % | HEART RATE: 94 BPM | TEMPERATURE: 98.7 F | DIASTOLIC BLOOD PRESSURE: 84 MMHG | BODY MASS INDEX: 47.84 KG/M2

## 2018-06-12 DIAGNOSIS — E11.8 TYPE 2 DIABETES MELLITUS WITH COMPLICATION, WITH LONG-TERM CURRENT USE OF INSULIN (HCC): Chronic | ICD-10-CM

## 2018-06-12 DIAGNOSIS — Z79.4 TYPE 2 DIABETES MELLITUS WITH COMPLICATION, WITH LONG-TERM CURRENT USE OF INSULIN (HCC): Chronic | ICD-10-CM

## 2018-06-12 DIAGNOSIS — Z09 HOSPITAL DISCHARGE FOLLOW-UP: Primary | ICD-10-CM

## 2018-06-12 DIAGNOSIS — I50.42 CHRONIC COMBINED SYSTOLIC AND DIASTOLIC CHF (CONGESTIVE HEART FAILURE) (HCC): ICD-10-CM

## 2018-06-12 DIAGNOSIS — J44.9 OSA AND COPD OVERLAP SYNDROME (HCC): ICD-10-CM

## 2018-06-12 DIAGNOSIS — G47.33 OSA AND COPD OVERLAP SYNDROME (HCC): ICD-10-CM

## 2018-06-12 PROCEDURE — 1111F DSCHRG MED/CURRENT MED MERGE: CPT | Performed by: FAMILY MEDICINE

## 2018-06-12 PROCEDURE — 99496 TRANSJ CARE MGMT HIGH F2F 7D: CPT | Performed by: FAMILY MEDICINE

## 2018-06-12 RX ORDER — OXYBUTYNIN CHLORIDE 10 MG/1
TABLET, EXTENDED RELEASE ORAL
Refills: 3 | COMMUNITY
Start: 2018-06-08 | End: 2018-07-26 | Stop reason: DRUGHIGH

## 2018-06-12 RX ORDER — DICYCLOMINE HYDROCHLORIDE 10 MG/1
CAPSULE ORAL
Refills: 2 | COMMUNITY
Start: 2018-06-08 | End: 2018-07-26 | Stop reason: ALTCHOICE

## 2018-06-12 RX ORDER — BENZONATATE 100 MG/1
100 CAPSULE ORAL 3 TIMES DAILY PRN
Qty: 30 CAPSULE | Refills: 0 | Status: SHIPPED | OUTPATIENT
Start: 2018-06-12 | End: 2018-06-19

## 2018-06-12 RX ORDER — PREDNISONE 10 MG/1
TABLET ORAL
Refills: 0 | COMMUNITY
Start: 2018-06-08 | End: 2018-07-26 | Stop reason: ALTCHOICE

## 2018-06-12 RX ORDER — INSULIN LISPRO 100 [IU]/ML
INJECTION, SOLUTION INTRAVENOUS; SUBCUTANEOUS
Refills: 2 | COMMUNITY
Start: 2018-05-23 | End: 2018-06-20 | Stop reason: SDUPTHER

## 2018-06-12 RX ORDER — PANTOPRAZOLE SODIUM 40 MG/1
TABLET, DELAYED RELEASE ORAL
Refills: 2 | COMMUNITY
Start: 2018-05-22 | End: 2019-06-26 | Stop reason: SDUPTHER

## 2018-06-12 RX ORDER — NAPROXEN 500 MG/1
TABLET ORAL
Refills: 0 | Status: ON HOLD | COMMUNITY
Start: 2018-06-07 | End: 2018-07-30 | Stop reason: HOSPADM

## 2018-06-12 RX ORDER — AZELASTINE HYDROCHLORIDE 137 UG/1
SPRAY, METERED NASAL
Refills: 3 | COMMUNITY
Start: 2018-05-22 | End: 2018-07-26 | Stop reason: ALTCHOICE

## 2018-06-13 ENCOUNTER — OFFICE VISIT (OUTPATIENT)
Dept: VASCULAR SURGERY | Age: 63
End: 2018-06-13
Payer: MEDICARE

## 2018-06-13 ENCOUNTER — TELEPHONE (OUTPATIENT)
Dept: FAMILY MEDICINE CLINIC | Age: 63
End: 2018-06-13

## 2018-06-13 DIAGNOSIS — E11.42 DIABETIC POLYNEUROPATHY ASSOCIATED WITH TYPE 2 DIABETES MELLITUS (HCC): Primary | ICD-10-CM

## 2018-06-13 DIAGNOSIS — I87.2 CHRONIC VENOUS INSUFFICIENCY: ICD-10-CM

## 2018-06-13 DIAGNOSIS — M48.062 SPINAL STENOSIS OF LUMBAR REGION WITH NEUROGENIC CLAUDICATION: ICD-10-CM

## 2018-06-13 DIAGNOSIS — I73.9 PVD (PERIPHERAL VASCULAR DISEASE) WITH CLAUDICATION (HCC): ICD-10-CM

## 2018-06-13 PROCEDURE — 3044F HG A1C LEVEL LT 7.0%: CPT | Performed by: SURGERY

## 2018-06-13 PROCEDURE — 3017F COLORECTAL CA SCREEN DOC REV: CPT | Performed by: SURGERY

## 2018-06-13 PROCEDURE — 1111F DSCHRG MED/CURRENT MED MERGE: CPT | Performed by: SURGERY

## 2018-06-13 PROCEDURE — G8598 ASA/ANTIPLAT THER USED: HCPCS | Performed by: SURGERY

## 2018-06-13 PROCEDURE — 4004F PT TOBACCO SCREEN RCVD TLK: CPT | Performed by: SURGERY

## 2018-06-13 PROCEDURE — G8417 CALC BMI ABV UP PARAM F/U: HCPCS | Performed by: SURGERY

## 2018-06-13 PROCEDURE — G8427 DOCREV CUR MEDS BY ELIG CLIN: HCPCS | Performed by: SURGERY

## 2018-06-13 PROCEDURE — 2022F DILAT RTA XM EVC RTNOPTHY: CPT | Performed by: SURGERY

## 2018-06-13 PROCEDURE — 99214 OFFICE O/P EST MOD 30 MIN: CPT | Performed by: SURGERY

## 2018-06-14 ENCOUNTER — TELEPHONE (OUTPATIENT)
Dept: VASCULAR SURGERY | Age: 63
End: 2018-06-14

## 2018-06-18 ENCOUNTER — TELEPHONE (OUTPATIENT)
Dept: FAMILY MEDICINE CLINIC | Age: 63
End: 2018-06-18

## 2018-06-20 ENCOUNTER — TELEPHONE (OUTPATIENT)
Dept: FAMILY MEDICINE CLINIC | Age: 63
End: 2018-06-20

## 2018-06-21 ENCOUNTER — TELEPHONE (OUTPATIENT)
Dept: FAMILY MEDICINE CLINIC | Age: 63
End: 2018-06-21

## 2018-06-21 RX ORDER — DIAPER,BRIEF,INFANT-TODD,DISP
EACH MISCELLANEOUS
Qty: 1 TUBE | Refills: 1 | Status: SHIPPED | OUTPATIENT
Start: 2018-06-21 | End: 2018-06-28

## 2018-06-21 RX ORDER — LOPERAMIDE HYDROCHLORIDE 2 MG/1
2 CAPSULE ORAL 2 TIMES DAILY PRN
Qty: 20 CAPSULE | Refills: 0 | Status: SHIPPED | OUTPATIENT
Start: 2018-06-21 | End: 2018-07-01

## 2018-06-22 ENCOUNTER — TELEPHONE (OUTPATIENT)
Dept: FAMILY MEDICINE CLINIC | Age: 63
End: 2018-06-22

## 2018-07-03 ENCOUNTER — TELEPHONE (OUTPATIENT)
Dept: FAMILY MEDICINE CLINIC | Age: 63
End: 2018-07-03

## 2018-07-03 NOTE — TELEPHONE ENCOUNTER
Just an FYI - Physical Therapy called and said they are discharging Geovanna Vazquez from PT due to the bout of diarrhea she is having. They are unable to get through therapy. They said that pt knows her exercises and will continue to do them. Once the diarrhea is over they can  and reevaluate therapy.

## 2018-07-06 ENCOUNTER — APPOINTMENT (OUTPATIENT)
Dept: CT IMAGING | Age: 63
End: 2018-07-06
Payer: MEDICARE

## 2018-07-06 ENCOUNTER — HOSPITAL ENCOUNTER (EMERGENCY)
Age: 63
Discharge: HOME | End: 2018-07-07
Attending: EMERGENCY MEDICINE
Payer: MEDICARE

## 2018-07-06 VITALS
SYSTOLIC BLOOD PRESSURE: 94 MMHG | BODY MASS INDEX: 48.58 KG/M2 | DIASTOLIC BLOOD PRESSURE: 43 MMHG | RESPIRATION RATE: 24 BRPM | TEMPERATURE: 97.6 F | WEIGHT: 264 LBS | HEIGHT: 62 IN | OXYGEN SATURATION: 96 % | HEART RATE: 82 BPM
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
## 2018-07-06 DIAGNOSIS — M54.50 LOW BACK PAIN WITHOUT SCIATICA, UNSPECIFIED BACK PAIN LATERALITY, UNSPECIFIED CHRONICITY: ICD-10-CM

## 2018-07-06 LAB
BASOPHILS ABSOLUTE: 0.02 E9/L (ref 0–0.2)
BASOPHILS RELATIVE PERCENT: 0.2 % (ref 0–2)
EOSINOPHILS ABSOLUTE: 0.2 E9/L (ref 0.05–0.5)
EOSINOPHILS RELATIVE PERCENT: 2.5 % (ref 0–6)
HCT VFR BLD CALC: 44.3 % (ref 34–48)
HEMOGLOBIN: 14.2 G/DL (ref 11.5–15.5)
IMMATURE GRANULOCYTES #: 0.04 E9/L
IMMATURE GRANULOCYTES %: 0.5 % (ref 0–5)
LYMPHOCYTES ABSOLUTE: 1.33 E9/L (ref 1.5–4)
LYMPHOCYTES RELATIVE PERCENT: 16.4 % (ref 20–42)
MCH RBC QN AUTO: 31.3 PG (ref 26–35)
MCHC RBC AUTO-ENTMCNC: 32.1 % (ref 32–34.5)
MCV RBC AUTO: 97.8 FL (ref 80–99.9)
MONOCYTES ABSOLUTE: 0.39 E9/L (ref 0.1–0.95)
MONOCYTES RELATIVE PERCENT: 4.8 % (ref 2–12)
NEUTROPHILS ABSOLUTE: 6.13 E9/L (ref 1.8–7.3)
NEUTROPHILS RELATIVE PERCENT: 75.6 % (ref 43–80)
PDW BLD-RTO: 13.4 FL (ref 11.5–15)
PLATELET # BLD: 264 E9/L (ref 130–450)
PMV BLD AUTO: 11.1 FL (ref 7–12)
RBC # BLD: 4.53 E12/L (ref 3.5–5.5)
WBC # BLD: 8.1 E9/L (ref 4.5–11.5)

## 2018-07-06 PROCEDURE — 72128 CT CHEST SPINE W/O DYE: CPT

## 2018-07-06 PROCEDURE — 99284 EMERGENCY DEPT VISIT MOD MDM: CPT

## 2018-07-06 PROCEDURE — 83690 ASSAY OF LIPASE: CPT

## 2018-07-06 PROCEDURE — 72125 CT NECK SPINE W/O DYE: CPT

## 2018-07-06 PROCEDURE — 85025 COMPLETE CBC W/AUTO DIFF WBC: CPT

## 2018-07-06 PROCEDURE — 93005 ELECTROCARDIOGRAM TRACING: CPT | Performed by: EMERGENCY MEDICINE

## 2018-07-06 PROCEDURE — 85610 PROTHROMBIN TIME: CPT

## 2018-07-06 PROCEDURE — 74176 CT ABD & PELVIS W/O CONTRAST: CPT

## 2018-07-06 PROCEDURE — 70450 CT HEAD/BRAIN W/O DYE: CPT

## 2018-07-06 PROCEDURE — 72131 CT LUMBAR SPINE W/O DYE: CPT

## 2018-07-06 PROCEDURE — 6370000000 HC RX 637 (ALT 250 FOR IP): Performed by: EMERGENCY MEDICINE

## 2018-07-06 PROCEDURE — 70486 CT MAXILLOFACIAL W/O DYE: CPT

## 2018-07-06 PROCEDURE — 80053 COMPREHEN METABOLIC PANEL: CPT

## 2018-07-06 PROCEDURE — 83605 ASSAY OF LACTIC ACID: CPT

## 2018-07-06 RX ORDER — ACETAMINOPHEN 500 MG
1000 TABLET ORAL ONCE
Status: COMPLETED | OUTPATIENT
Start: 2018-07-07 | End: 2018-07-06

## 2018-07-06 RX ORDER — 0.9 % SODIUM CHLORIDE 0.9 %
1000 INTRAVENOUS SOLUTION INTRAVENOUS ONCE
Status: DISCONTINUED | OUTPATIENT
Start: 2018-07-06 | End: 2018-07-07 | Stop reason: HOSPADM

## 2018-07-06 RX ADMIN — ACETAMINOPHEN 1000 MG: 500 TABLET ORAL at 23:58

## 2018-07-06 ASSESSMENT — PAIN DESCRIPTION - LOCATION: LOCATION: BACK

## 2018-07-06 ASSESSMENT — PAIN DESCRIPTION - PAIN TYPE: TYPE: ACUTE PAIN

## 2018-07-06 ASSESSMENT — ENCOUNTER SYMPTOMS
SORE THROAT: 0
RHINORRHEA: 1
COUGH: 0
RECTAL PAIN: 1
BLOOD IN STOOL: 1
SHORTNESS OF BREATH: 1
BACK PAIN: 1
DIARRHEA: 1

## 2018-07-06 ASSESSMENT — PAIN SCALES - GENERAL
PAINLEVEL_OUTOF10: 10
PAINLEVEL_OUTOF10: 9

## 2018-07-07 LAB
ALBUMIN SERPL-MCNC: 4 G/DL (ref 3.5–5.2)
ALP BLD-CCNC: 80 U/L (ref 35–104)
ALT SERPL-CCNC: 19 U/L (ref 0–32)
ANION GAP SERPL CALCULATED.3IONS-SCNC: 13 MMOL/L (ref 7–16)
AST SERPL-CCNC: 14 U/L (ref 0–31)
BACTERIA: ABNORMAL /HPF
BILIRUB SERPL-MCNC: 0.3 MG/DL (ref 0–1.2)
BILIRUBIN URINE: NEGATIVE
BLOOD, URINE: NEGATIVE
BUN BLDV-MCNC: 20 MG/DL (ref 8–23)
CALCIUM SERPL-MCNC: 9.3 MG/DL (ref 8.6–10.2)
CHLORIDE BLD-SCNC: 103 MMOL/L (ref 98–107)
CLARITY: CLEAR
CO2: 28 MMOL/L (ref 22–29)
COLOR: YELLOW
CREAT SERPL-MCNC: 1.3 MG/DL (ref 0.5–1)
EPITHELIAL CELLS, UA: ABNORMAL /HPF
GFR AFRICAN AMERICAN: 50
GFR NON-AFRICAN AMERICAN: 50 ML/MIN/1.73
GLUCOSE BLD-MCNC: 170 MG/DL (ref 74–109)
GLUCOSE URINE: NEGATIVE MG/DL
INR BLD: 1.1
KETONES, URINE: NEGATIVE MG/DL
LACTIC ACID: 1.6 MMOL/L (ref 0.5–2.2)
LEUKOCYTE ESTERASE, URINE: NEGATIVE
LIPASE: 59 U/L (ref 13–60)
NITRITE, URINE: POSITIVE
PH UA: 6 (ref 5–9)
POTASSIUM SERPL-SCNC: 3.7 MMOL/L (ref 3.5–5)
PROTEIN UA: NEGATIVE MG/DL
PROTHROMBIN TIME: 13.1 SEC (ref 9.3–12.4)
RBC UA: ABNORMAL /HPF (ref 0–2)
SODIUM BLD-SCNC: 144 MMOL/L (ref 132–146)
SPECIFIC GRAVITY UA: 1.02 (ref 1–1.03)
TOTAL PROTEIN: 7.3 G/DL (ref 6.4–8.3)
UROBILINOGEN, URINE: 0.2 E.U./DL
WBC UA: ABNORMAL /HPF (ref 0–5)

## 2018-07-07 PROCEDURE — 81001 URINALYSIS AUTO W/SCOPE: CPT

## 2018-07-07 NOTE — ED PROVIDER NOTES
Cardiac catheterization (04/20/2015); Hysterectomy; joint replacement (2011); Upper gastrointestinal endoscopy (12/23/2016); Colonoscopy (11/15/2013); Colonoscopy (12/23/2016); Upper gastrointestinal endoscopy (02/08/2017); Endoscopy, colon, diagnostic (04/18/2017); and Gallbladder surgery. Social History:  reports that she has been smoking Cigarettes. She started smoking about 43 years ago. She has a 10.00 pack-year smoking history. She has never used smokeless tobacco. She reports that she uses drugs, including Marijuana. She reports that she does not drink alcohol. Family History: family history includes Asthma in her father; Cancer in her mother. The patients home medications have been reviewed. Allergies: Latex; Bee venom; Dilaudid [hydromorphone hcl]; Dye [iodides]; Percocet [oxycodone-acetaminophen]; Keflex [cephalexin]; Lasix [furosemide]; Levaquin [levofloxacin in d5w]; Lipitor; Lyrica [pregabalin]; Morphine; Naproxen; Nefazodone; Norvasc [amlodipine]; Oxycodone-acetaminophen;  Shellfish-derived products; and Trazodone and nefazodone    -------------------------------------------------- RESULTS -------------------------------------------------  Labs:  Results for orders placed or performed during the hospital encounter of 07/06/18   CBC Auto Differential   Result Value Ref Range    WBC 8.1 4.5 - 11.5 E9/L    RBC 4.53 3.50 - 5.50 E12/L    Hemoglobin 14.2 11.5 - 15.5 g/dL    Hematocrit 44.3 34.0 - 48.0 %    MCV 97.8 80.0 - 99.9 fL    MCH 31.3 26.0 - 35.0 pg    MCHC 32.1 32.0 - 34.5 %    RDW 13.4 11.5 - 15.0 fL    Platelets 443 956 - 623 E9/L    MPV 11.1 7.0 - 12.0 fL    Neutrophils % 75.6 43.0 - 80.0 %    Immature Granulocytes % 0.5 0.0 - 5.0 %    Lymphocytes % 16.4 (L) 20.0 - 42.0 %    Monocytes % 4.8 2.0 - 12.0 %    Eosinophils % 2.5 0.0 - 6.0 %    Basophils % 0.2 0.0 - 2.0 %    Neutrophils # 6.13 1.80 - 7.30 E9/L    Immature Granulocytes # 0.04 E9/L    Lymphocytes # 1.33 (L) 1.50 - 4.00 E9/L for re evaluation. They will followup with their primary care physician by calling their office on Monday.      --------------------------------- ADDITIONAL PROVIDER NOTES ---------------------------------  At this time the patient is without objective evidence of an acute process requiring hospitalization or inpatient management. They have remained hemodynamically stable throughout their entire ED visit and are stable for discharge with outpatient follow-up. The plan has been discussed in detail and they are aware of the specific conditions for emergent return, as well as the importance of follow-up. New Prescriptions    No medications on file       Diagnosis:  1. Fall, initial encounter    2. Low back pain without sciatica, unspecified back pain laterality, unspecified chronicity        Disposition:  Patient's disposition: Discharge to home  Patient's condition is stable.        Patrick Orourke,   Resident  07/07/18 [id number]

## 2018-07-09 LAB
EKG ATRIAL RATE: 83 BPM
EKG P AXIS: 55 DEGREES
EKG P-R INTERVAL: 186 MS
EKG Q-T INTERVAL: 482 MS
EKG QRS DURATION: 152 MS
EKG QTC CALCULATION (BAZETT): 566 MS
EKG R AXIS: 46 DEGREES
EKG T AXIS: 48 DEGREES
EKG VENTRICULAR RATE: 83 BPM

## 2018-07-09 RX ORDER — TRAZODONE HYDROCHLORIDE 100 MG/1
100 TABLET ORAL NIGHTLY
Qty: 30 TABLET | Refills: 2 | Status: SHIPPED | OUTPATIENT
Start: 2018-07-09 | End: 2018-09-27 | Stop reason: SDUPTHER

## 2018-07-09 RX ORDER — GABAPENTIN 300 MG/1
600 CAPSULE ORAL 3 TIMES DAILY
Qty: 180 CAPSULE | Refills: 2 | Status: SHIPPED | OUTPATIENT
Start: 2018-07-09 | End: 2018-09-27 | Stop reason: SDUPTHER

## 2018-07-10 ENCOUNTER — OFFICE VISIT (OUTPATIENT)
Dept: FAMILY MEDICINE CLINIC | Age: 63
End: 2018-07-10
Payer: MEDICARE

## 2018-07-10 VITALS
TEMPERATURE: 99.3 F | BODY MASS INDEX: 48.4 KG/M2 | WEIGHT: 263 LBS | DIASTOLIC BLOOD PRESSURE: 88 MMHG | HEART RATE: 87 BPM | HEIGHT: 62 IN | SYSTOLIC BLOOD PRESSURE: 136 MMHG | RESPIRATION RATE: 20 BRPM
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
## 2018-07-10 DIAGNOSIS — E11.8 TYPE 2 DIABETES MELLITUS WITH COMPLICATION, WITH LONG-TERM CURRENT USE OF INSULIN (HCC): Chronic | ICD-10-CM

## 2018-07-10 DIAGNOSIS — I50.42 CHRONIC COMBINED SYSTOLIC AND DIASTOLIC CHF (CONGESTIVE HEART FAILURE) (HCC): ICD-10-CM

## 2018-07-10 DIAGNOSIS — G47.33 OSA AND COPD OVERLAP SYNDROME (HCC): ICD-10-CM

## 2018-07-10 DIAGNOSIS — J44.9 OSA AND COPD OVERLAP SYNDROME (HCC): ICD-10-CM

## 2018-07-10 DIAGNOSIS — R19.7 DIARRHEA, UNSPECIFIED TYPE: ICD-10-CM

## 2018-07-10 DIAGNOSIS — Z79.4 TYPE 2 DIABETES MELLITUS WITH COMPLICATION, WITH LONG-TERM CURRENT USE OF INSULIN (HCC): Chronic | ICD-10-CM

## 2018-07-10 DIAGNOSIS — M47.816 SPONDYLOSIS OF LUMBAR REGION WITHOUT MYELOPATHY OR RADICULOPATHY: ICD-10-CM

## 2018-07-10 DIAGNOSIS — M51.26 LUMBAR DISC HERNIATION: ICD-10-CM

## 2018-07-10 DIAGNOSIS — M48.062 SPINAL STENOSIS OF LUMBAR REGION WITH NEUROGENIC CLAUDICATION: ICD-10-CM

## 2018-07-10 PROCEDURE — G8926 SPIRO NO PERF OR DOC: HCPCS | Performed by: FAMILY MEDICINE

## 2018-07-10 PROCEDURE — G8417 CALC BMI ABV UP PARAM F/U: HCPCS | Performed by: FAMILY MEDICINE

## 2018-07-10 PROCEDURE — 4004F PT TOBACCO SCREEN RCVD TLK: CPT | Performed by: FAMILY MEDICINE

## 2018-07-10 PROCEDURE — G8427 DOCREV CUR MEDS BY ELIG CLIN: HCPCS | Performed by: FAMILY MEDICINE

## 2018-07-10 PROCEDURE — 99215 OFFICE O/P EST HI 40 MIN: CPT | Performed by: FAMILY MEDICINE

## 2018-07-10 PROCEDURE — 3044F HG A1C LEVEL LT 7.0%: CPT | Performed by: FAMILY MEDICINE

## 2018-07-10 PROCEDURE — 2022F DILAT RTA XM EVC RTNOPTHY: CPT | Performed by: FAMILY MEDICINE

## 2018-07-10 PROCEDURE — 3017F COLORECTAL CA SCREEN DOC REV: CPT | Performed by: FAMILY MEDICINE

## 2018-07-10 PROCEDURE — 3023F SPIROM DOC REV: CPT | Performed by: FAMILY MEDICINE

## 2018-07-10 PROCEDURE — G8598 ASA/ANTIPLAT THER USED: HCPCS | Performed by: FAMILY MEDICINE

## 2018-07-10 RX ORDER — LANCETS
EACH MISCELLANEOUS
COMMUNITY
End: 2019-06-26 | Stop reason: SDUPTHER

## 2018-07-10 RX ORDER — CLOTRIMAZOLE AND BETAMETHASONE DIPROPIONATE 10; .64 MG/G; MG/G
CREAM TOPICAL
Qty: 60 G | Refills: 0 | Status: SHIPPED | OUTPATIENT
Start: 2018-07-10 | End: 2018-07-26 | Stop reason: ALTCHOICE

## 2018-07-10 RX ORDER — TRAMADOL HYDROCHLORIDE 50 MG/1
50 TABLET ORAL EVERY 8 HOURS PRN
Qty: 21 TABLET | Refills: 0 | Status: SHIPPED | OUTPATIENT
Start: 2018-07-10 | End: 2018-07-17

## 2018-07-10 NOTE — PROGRESS NOTES
disease) ([address]     Depression     Diabetes mellitus ([address]     Diabetic neuropathy ([address]     Diverticulosis     Fatty liver 1/8/2016    per US    Glaucoma, open angle 2/1/2016    Mild-OU    Hepatic encephalopathy ([address] 02/07/2016    resolved    Hiatal hernia     History of blood transfusion     Hyperlipidemia     Hyperplastic colon polyp     Hypertension     Incontinence     Liver mass     Morbid obesity ([address]     Movement disorder     Myocardial infarction 2011    Osteoarthritis, generalized     Pain in both lower legs 10/11/2017    Peripheral vascular disease ([address]     Pneumonia 1/26/2014    Pulmonary edema     resolved    PVD (peripheral vascular disease) with claudication ([address] 8/30/2017    Sleep apnea     uses BI PAP    Tobacco abuse     Tobacco abuse 10/11/2017    Tubular adenoma polyp of rectum     Urinary tract infection due to ESBL Klebsiella 03/08/2017    Ventral hernia        Past Surgical History:   Procedure Laterality Date    BREAST SURGERY  2000    bilateral reduction    BRONCHIAL BRUSH BIOPSY  1/25/2013    Dr Bry Jacobson  04/20/2015    Dr. Matt Pompa  12/2011     DR. Rachel Loya,  follows Dr Khadijah Das  11/15/2013    Dr Abbey Puri COLONOSCOPY  12/23/2016    Dr Virginia Khan adenoma & hyperplastic polyps, melanosis coli (repeat one year 12/2017)    CORONARY ARTERY BYPASS GRAFT      ECHO COMPLETE  10/9/2013         ENDOSCOPY, COLON, DIAGNOSTIC  04/18/2017    GALLBLADDER SURGERY      gallstones removed, [facility] 8/2017    HYSTERECTOMY      JOINT REPLACEMENT  2011    LEFT KNEE    KNEE SURGERY      left knee replacement    LAYER WOUND CLOSURE Left [id number]    LIVER BIOPSY  1/8/2016    [facility]    POLYSOMNOGRAPHY  1/2016    [facility]    UPPER GASTROINTESTINAL ENDOSCOPY  12/20/12    UPPER GASTROINTESTINAL ENDOSCOPY  12/23/2016    Dr Abbey Puri UPPER GASTROINTESTINAL ENDOSCOPY 1 TABLET FOR 3 DAYS  0    Azelastine HCl 137 MCG/SPRAY SOLN USE 1 SPRAYS IN EACH NOSTRIL 3 TIMES A DAY  3    dicyclomine (BENTYL) 10 MG capsule TAKE 1 CAPSULE BY MOUTH TWICE A DAY  2    naproxen (NAPROSYN) 500 MG tablet TAKE 1 TABLET BY MOUTH TWICE A DAY AS NEEDED FOR PAIN  0    oxybutynin (DITROPAN-XL) 10 MG extended release tablet TAKE 1 TABLET BY MOUTH EVERY DAY  3    pantoprazole (PROTONIX) 40 MG tablet TAKE 1 TABLET BY MOUTH TWICE A DAY  2    rosuvastatin (CRESTOR) 10 MG tablet Take 1 tablet by mouth nightly 30 tablet 0    hydrALAZINE (APRESOLINE) 100 MG tablet Take 1 tablet by mouth 3 times daily 90 tablet 0    Insulin Degludec (TRESIBA FLEXTOUCH) 200 UNIT/ML SOPN Inject 80 Units into the skin 2 times daily 1 pen 0    ursodiol (ACTIGALL) 300 MG capsule Take 300 mg by mouth 2 times daily      Lidocaine, Anorectal, 5 % CREA Apply topically 3 times daily as needed      montelukast (SINGULAIR) 10 MG tablet Take 10 mg by mouth nightly       aspirin 81 MG chewable tablet TAKE 1 TABLET BY MOUTH DAILY  0    levalbuterol (XOPENEX) 0.31 MG/3ML nebulization 2 mg      SPIRIVA RESPIMAT 1.25 MCG/ACT AERS inhaler INHALE 2 PUFF BY INHALATION ROUTE EVERY DAY  6    AMITIZA 8 MCG CAPS capsule TAKE 1 CAPSULE BY MOUTH TWICE A DAY  2    sacubitril-valsartan (ENTRESTO) 24-26 MG per tablet Take 1 tablet by mouth 2 times daily      metoprolol succinate (TOPROL XL) 25 MG extended release tablet Take 1 tablet by mouth 2 times daily (Patient taking differently: Take 75 mg by mouth 2 times daily ) 180 tablet 3    isosorbide mononitrate (IMDUR) 60 MG extended release tablet Take 1 tablet by mouth daily 90 tablet 3    fluticasone (FLONASE) 50 MCG/ACT nasal spray 1 spray by Nasal route 2 times daily 1 Bottle 3    sodium chloride (OCEAN, BABY AYR) 0.65 % nasal spray 2 sprays by Nasal route as needed for Congestion (keep at bedside and use prn) 1 Bottle 5    clotrimazole (LOTRIMIN AF) 1 % cream Apply 1 applicator topically Wt 263 lb (119.3 kg)   LMP 01/01/1990   BMI 48.10 kg/m²   Physical Exam   Constitutional: She is oriented to person, place, and time. She appears well-developed and well-nourished. HENT:   Head: Normocephalic and atraumatic. Right Ear: External ear normal.   Left Ear: External ear normal.   Nose: Nose normal.   Mouth/Throat: Oropharynx is clear and moist.   Eyes: Conjunctivae and EOM are normal. Pupils are equal, round, and reactive to light. Neck: Normal range of motion. Neck supple. No JVD present. Cardiovascular: Normal rate, regular rhythm and normal heart sounds. No murmur heard. No edema, pvd changes noted on LE   Pulmonary/Chest: Effort normal and breath sounds normal. No respiratory distress. She has no wheezes. She has no rales. Abdominal: Soft. Bowel sounds are normal. There is no tenderness. Musculoskeletal: Tenderness: lumbar, cervical spinal musculatrue. Uses walker   Lymphadenopathy:     She has no cervical adenopathy. Neurological: She is alert and oriented to person, place, and time. Coordination normal.   Skin: Skin is warm and dry. No rash noted. Psychiatric: She has a normal mood and affect. Her behavior is normal.            Laboratory:   All laboratory and radiology results have been personally reviewed by myself    Lab Results   Component Value Date     07/06/2018    K 3.7 07/06/2018     07/06/2018    CO2 28 07/06/2018    BUN 20 07/06/2018    CREATININE 1.3 07/06/2018    PROT 7.3 07/06/2018    LABALBU 4.0 07/06/2018    LABALBU 5.2 12/01/2011    CALCIUM 9.3 07/06/2018    GFRAA 50 07/06/2018    LABGLOM 50 07/06/2018    GLUCOSE 170 07/06/2018    GLUCOSE 181 12/01/2011    AST 14 07/06/2018    ALT 19 07/06/2018    ALKPHOS 80 07/06/2018    BILITOT 0.3 07/06/2018    TSH 1.560 11/07/2017    CHOL 222 06/05/2018    TRIG 84 06/05/2018    HDL 38 06/05/2018    LDLCALC 167 06/05/2018    LABA1C 5.9 05/09/2018        Lab Results   Component Value Date    CHOL 222 (H) narcotic will be given after that. Patient understands and agrees. No changes to her diabetes regimen at this time. Did recommend for her to continue to follow-up with all of her specialists including her gastroenterologist to further evaluate her diarrhea. Otherwise I will see her back in 1 month for reevaluation. More than 40 minutes was spent with the patient during the encounter, during which more than half the time was spent counseling on the above concerns    Controlled Substances Monitoring:     RX Monitoring 7/10/2018   Attestation The Prescription Monitoring Report for this patient was reviewed today. Documentation Possible medication side effects, risk of tolerance/dependence & alternative treatments discussed. ;No signs of potential drug abuse or diversion identified. Problem list reviewed and simplified/updated  HM reviewed today and counseled as appropriate    Call or go to ED immediately if symptoms worsen or persist.  Future Appointments  Date Time Provider Melanie Sellers   8/8/2018 12:40 PM Francesca Liu MD [address] [facility]   8/10/2018 1:15 PM Veronique Bear DO [facility]   8/30/2018 1:30 PM Zita Swann MD [facility]     Or sooner if necessary.       Educational materials and/or home exercises printed for patient's review and were included in patient instructions on his/her After Visit Summary and given to patient at the end of visit.       Counseled regarding above diagnosis, including possible risks and complications,  especially if left uncontrolled.     Counseled regarding the possible side effects, risks, benefits and alternatives to treatment; patient and/or guardian verbalizes understanding, agrees, feels comfortable with and wishes to proceed with above treatment plan.     Advised patient to call with any new medication issues, and read all Rx info from pharmacy to assure aware of all possible risks and side effects of medication before

## 2018-07-11 ASSESSMENT — ENCOUNTER SYMPTOMS
CONSTIPATION: 0
BACK PAIN: 1
ABDOMINAL PAIN: 1
NAUSEA: 0
DIARRHEA: 1
TROUBLE SWALLOWING: 0
VOMITING: 0
SINUS PAIN: 0
RHINORRHEA: 0
SORE THROAT: 0
COUGH: 0
SHORTNESS OF BREATH: 0

## 2018-07-19 ENCOUNTER — TELEPHONE (OUTPATIENT)
Dept: FAMILY MEDICINE CLINIC | Age: 63
End: 2018-07-19

## 2018-07-19 NOTE — TELEPHONE ENCOUNTER
Recommend to proceed to the ED if this continues, also recommend to make an appointment with youseff to further evaluate this.

## 2018-07-26 ENCOUNTER — HOSPITAL ENCOUNTER (INPATIENT)
Age: 63
LOS: 3 days | Discharge: HOME HEALTH | DRG: 291 | End: 2018-07-31
Attending: EMERGENCY MEDICINE | Admitting: INTERNAL MEDICINE
Payer: MEDICARE

## 2018-07-26 ENCOUNTER — APPOINTMENT (OUTPATIENT)
Dept: GENERAL RADIOLOGY | Age: 63
DRG: 291 | End: 2018-07-26
Payer: MEDICARE

## 2018-07-26 DIAGNOSIS — I50.9 CONGESTIVE HEART FAILURE, UNSPECIFIED CONGESTIVE HEART FAILURE CHRONICITY, UNSPECIFIED CONGESTIVE HEART FAILURE TYPE: ICD-10-CM

## 2018-07-26 DIAGNOSIS — R07.9 CHEST PAIN, UNSPECIFIED TYPE: Primary | ICD-10-CM

## 2018-07-26 LAB
ALBUMIN SERPL-MCNC: 3.8 G/DL (ref 3.5–5.2)
ALP BLD-CCNC: 87 U/L (ref 35–104)
ALT SERPL-CCNC: 25 U/L (ref 0–32)
ANION GAP SERPL CALCULATED.3IONS-SCNC: 11 MMOL/L (ref 7–16)
APTT: 37.4 SEC (ref 24.5–35.1)
AST SERPL-CCNC: 20 U/L (ref 0–31)
BASOPHILS ABSOLUTE: 0.02 E9/L (ref 0–0.2)
BASOPHILS RELATIVE PERCENT: 0.3 % (ref 0–2)
BILIRUB SERPL-MCNC: 0.3 MG/DL (ref 0–1.2)
BUN BLDV-MCNC: 15 MG/DL (ref 8–23)
CALCIUM SERPL-MCNC: 9 MG/DL (ref 8.6–10.2)
CHLORIDE BLD-SCNC: 101 MMOL/L (ref 98–107)
CO2: 30 MMOL/L (ref 22–29)
CREAT SERPL-MCNC: 1.1 MG/DL (ref 0.5–1)
EOSINOPHILS ABSOLUTE: 0.11 E9/L (ref 0.05–0.5)
EOSINOPHILS RELATIVE PERCENT: 1.6 % (ref 0–6)
GFR AFRICAN AMERICAN: >60
GFR NON-AFRICAN AMERICAN: >60 ML/MIN/1.73
GLUCOSE BLD-MCNC: 182 MG/DL (ref 74–109)
HCT VFR BLD CALC: 44.4 % (ref 34–48)
HEMOGLOBIN: 14.3 G/DL (ref 11.5–15.5)
IMMATURE GRANULOCYTES #: 0.02 E9/L
IMMATURE GRANULOCYTES %: 0.3 % (ref 0–5)
INR BLD: 1.1
LACTIC ACID: 1.5 MMOL/L (ref 0.5–2.2)
LYMPHOCYTES ABSOLUTE: 1.08 E9/L (ref 1.5–4)
LYMPHOCYTES RELATIVE PERCENT: 16 % (ref 20–42)
MCH RBC QN AUTO: 31.2 PG (ref 26–35)
MCHC RBC AUTO-ENTMCNC: 32.2 % (ref 32–34.5)
MCV RBC AUTO: 96.9 FL (ref 80–99.9)
METER GLUCOSE: 105 MG/DL (ref 70–110)
MONOCYTES ABSOLUTE: 0.4 E9/L (ref 0.1–0.95)
MONOCYTES RELATIVE PERCENT: 5.9 % (ref 2–12)
NEUTROPHILS ABSOLUTE: 5.11 E9/L (ref 1.8–7.3)
NEUTROPHILS RELATIVE PERCENT: 75.9 % (ref 43–80)
PDW BLD-RTO: 13.6 FL (ref 11.5–15)
PLATELET # BLD: 258 E9/L (ref 130–450)
PMV BLD AUTO: 10.9 FL (ref 7–12)
POTASSIUM SERPL-SCNC: 4.5 MMOL/L (ref 3.5–5)
PRO-BNP: 1268 PG/ML (ref 0–125)
PROTHROMBIN TIME: 12.2 SEC (ref 9.3–12.4)
RBC # BLD: 4.58 E12/L (ref 3.5–5.5)
SODIUM BLD-SCNC: 142 MMOL/L (ref 132–146)
TOTAL PROTEIN: 7.2 G/DL (ref 6.4–8.3)
TROPONIN: <0.01 NG/ML (ref 0–0.03)
WBC # BLD: 6.7 E9/L (ref 4.5–11.5)

## 2018-07-26 PROCEDURE — 87486 CHLMYD PNEUM DNA AMP PROBE: CPT

## 2018-07-26 PROCEDURE — 87502 INFLUENZA DNA AMP PROBE: CPT

## 2018-07-26 PROCEDURE — 6360000002 HC RX W HCPCS: Performed by: EMERGENCY MEDICINE

## 2018-07-26 PROCEDURE — 87503 INFLUENZA DNA AMP PROB ADDL: CPT

## 2018-07-26 PROCEDURE — 82962 GLUCOSE BLOOD TEST: CPT

## 2018-07-26 PROCEDURE — 84484 ASSAY OF TROPONIN QUANT: CPT

## 2018-07-26 PROCEDURE — 99222 1ST HOSP IP/OBS MODERATE 55: CPT | Performed by: INTERNAL MEDICINE

## 2018-07-26 PROCEDURE — 36415 COLL VENOUS BLD VENIPUNCTURE: CPT

## 2018-07-26 PROCEDURE — 85730 THROMBOPLASTIN TIME PARTIAL: CPT

## 2018-07-26 PROCEDURE — 71045 X-RAY EXAM CHEST 1 VIEW: CPT

## 2018-07-26 PROCEDURE — 96374 THER/PROPH/DIAG INJ IV PUSH: CPT

## 2018-07-26 PROCEDURE — 99285 EMERGENCY DEPT VISIT HI MDM: CPT

## 2018-07-26 PROCEDURE — 2580000003 HC RX 258: Performed by: INTERNAL MEDICINE

## 2018-07-26 PROCEDURE — 83880 ASSAY OF NATRIURETIC PEPTIDE: CPT

## 2018-07-26 PROCEDURE — 94760 N-INVAS EAR/PLS OXIMETRY 1: CPT

## 2018-07-26 PROCEDURE — G0378 HOSPITAL OBSERVATION PER HR: HCPCS

## 2018-07-26 PROCEDURE — 87070 CULTURE OTHR SPECIMN AEROBIC: CPT

## 2018-07-26 PROCEDURE — 87798 DETECT AGENT NOS DNA AMP: CPT

## 2018-07-26 PROCEDURE — 2580000003 HC RX 258: Performed by: EMERGENCY MEDICINE

## 2018-07-26 PROCEDURE — 87581 M.PNEUMON DNA AMP PROBE: CPT

## 2018-07-26 PROCEDURE — 83605 ASSAY OF LACTIC ACID: CPT

## 2018-07-26 PROCEDURE — 80053 COMPREHEN METABOLIC PANEL: CPT

## 2018-07-26 PROCEDURE — 6370000000 HC RX 637 (ALT 250 FOR IP): Performed by: EMERGENCY MEDICINE

## 2018-07-26 PROCEDURE — 87205 SMEAR GRAM STAIN: CPT

## 2018-07-26 PROCEDURE — 6370000000 HC RX 637 (ALT 250 FOR IP): Performed by: INTERNAL MEDICINE

## 2018-07-26 PROCEDURE — 85610 PROTHROMBIN TIME: CPT

## 2018-07-26 PROCEDURE — 2500000003 HC RX 250 WO HCPCS: Performed by: EMERGENCY MEDICINE

## 2018-07-26 PROCEDURE — 85025 COMPLETE CBC W/AUTO DIFF WBC: CPT

## 2018-07-26 PROCEDURE — 51702 INSERT TEMP BLADDER CATH: CPT

## 2018-07-26 RX ORDER — DEXTROSE MONOHYDRATE 25 G/50ML
12.5 INJECTION, SOLUTION INTRAVENOUS PRN
Status: DISCONTINUED | OUTPATIENT
Start: 2018-07-26 | End: 2018-07-31 | Stop reason: HOSPADM

## 2018-07-26 RX ORDER — ACETAMINOPHEN 325 MG/1
650 TABLET ORAL EVERY 6 HOURS PRN
Status: DISCONTINUED | OUTPATIENT
Start: 2018-07-26 | End: 2018-07-31 | Stop reason: HOSPADM

## 2018-07-26 RX ORDER — ROSUVASTATIN CALCIUM 10 MG/1
10 TABLET, COATED ORAL NIGHTLY
Status: DISCONTINUED | OUTPATIENT
Start: 2018-07-26 | End: 2018-07-31 | Stop reason: HOSPADM

## 2018-07-26 RX ORDER — HYDRALAZINE HYDROCHLORIDE 50 MG/1
100 TABLET, FILM COATED ORAL 3 TIMES DAILY
Status: DISCONTINUED | OUTPATIENT
Start: 2018-07-26 | End: 2018-07-31 | Stop reason: HOSPADM

## 2018-07-26 RX ORDER — ACETAMINOPHEN 500 MG
1000 TABLET ORAL EVERY 6 HOURS PRN
Status: ON HOLD | COMMUNITY
End: 2019-04-11

## 2018-07-26 RX ORDER — SENNA PLUS 8.6 MG/1
2 TABLET ORAL 2 TIMES DAILY
COMMUNITY
End: 2018-08-08 | Stop reason: ALTCHOICE

## 2018-07-26 RX ORDER — OXYBUTYNIN CHLORIDE 5 MG/1
5 TABLET, EXTENDED RELEASE ORAL DAILY
COMMUNITY
End: 2018-08-08 | Stop reason: ALTCHOICE

## 2018-07-26 RX ORDER — ALBUTEROL SULFATE 2.5 MG/3ML
2.5 SOLUTION RESPIRATORY (INHALATION) EVERY 6 HOURS PRN
COMMUNITY
End: 2021-05-05

## 2018-07-26 RX ORDER — METOPROLOL SUCCINATE 25 MG/1
25 TABLET, EXTENDED RELEASE ORAL 2 TIMES DAILY
Status: DISCONTINUED | OUTPATIENT
Start: 2018-07-26 | End: 2018-07-31 | Stop reason: HOSPADM

## 2018-07-26 RX ORDER — BUMETANIDE 0.25 MG/ML
2 INJECTION, SOLUTION INTRAMUSCULAR; INTRAVENOUS 2 TIMES DAILY
Status: DISCONTINUED | OUTPATIENT
Start: 2018-07-27 | End: 2018-07-26

## 2018-07-26 RX ORDER — ASPIRIN 81 MG/1
81 TABLET, CHEWABLE ORAL DAILY
Status: DISCONTINUED | OUTPATIENT
Start: 2018-07-27 | End: 2018-07-31 | Stop reason: HOSPADM

## 2018-07-26 RX ORDER — 0.9 % SODIUM CHLORIDE 0.9 %
500 INTRAVENOUS SOLUTION INTRAVENOUS ONCE
Status: COMPLETED | OUTPATIENT
Start: 2018-07-26 | End: 2018-07-26

## 2018-07-26 RX ORDER — CETIRIZINE HYDROCHLORIDE 10 MG/1
10 TABLET ORAL DAILY
COMMUNITY
End: 2019-01-09 | Stop reason: SDUPTHER

## 2018-07-26 RX ORDER — SODIUM CHLORIDE 0.9 % (FLUSH) 0.9 %
10 SYRINGE (ML) INJECTION EVERY 12 HOURS SCHEDULED
Status: DISCONTINUED | OUTPATIENT
Start: 2018-07-26 | End: 2018-07-31 | Stop reason: HOSPADM

## 2018-07-26 RX ORDER — DEXTROSE MONOHYDRATE 50 MG/ML
100 INJECTION, SOLUTION INTRAVENOUS PRN
Status: DISCONTINUED | OUTPATIENT
Start: 2018-07-26 | End: 2018-07-31 | Stop reason: HOSPADM

## 2018-07-26 RX ORDER — KETOCONAZOLE 20 MG/G
CREAM TOPICAL DAILY
COMMUNITY
End: 2018-08-29

## 2018-07-26 RX ORDER — ONDANSETRON 2 MG/ML
4 INJECTION INTRAMUSCULAR; INTRAVENOUS EVERY 6 HOURS PRN
Status: DISCONTINUED | OUTPATIENT
Start: 2018-07-26 | End: 2018-07-31 | Stop reason: HOSPADM

## 2018-07-26 RX ORDER — MONTELUKAST SODIUM 10 MG/1
10 TABLET ORAL NIGHTLY
Status: DISCONTINUED | OUTPATIENT
Start: 2018-07-26 | End: 2018-07-31 | Stop reason: HOSPADM

## 2018-07-26 RX ORDER — GABAPENTIN 600 MG/1
600 TABLET ORAL 3 TIMES DAILY
Status: DISCONTINUED | OUTPATIENT
Start: 2018-07-26 | End: 2018-07-28

## 2018-07-26 RX ORDER — BUMETANIDE 0.25 MG/ML
1 INJECTION, SOLUTION INTRAMUSCULAR; INTRAVENOUS ONCE
Status: COMPLETED | OUTPATIENT
Start: 2018-07-26 | End: 2018-07-26

## 2018-07-26 RX ORDER — ERGOCALCIFEROL 1.25 MG/1
50000 CAPSULE ORAL WEEKLY
COMMUNITY
End: 2018-08-29 | Stop reason: SDUPTHER

## 2018-07-26 RX ORDER — BUMETANIDE 0.25 MG/ML
2 INJECTION, SOLUTION INTRAMUSCULAR; INTRAVENOUS DAILY
Status: DISCONTINUED | OUTPATIENT
Start: 2018-07-27 | End: 2018-07-28

## 2018-07-26 RX ORDER — ISOSORBIDE MONONITRATE 60 MG/1
60 TABLET, EXTENDED RELEASE ORAL DAILY
Status: DISCONTINUED | OUTPATIENT
Start: 2018-07-27 | End: 2018-07-31 | Stop reason: HOSPADM

## 2018-07-26 RX ORDER — SODIUM CHLORIDE 0.9 % (FLUSH) 0.9 %
10 SYRINGE (ML) INJECTION PRN
Status: DISCONTINUED | OUTPATIENT
Start: 2018-07-26 | End: 2018-07-31 | Stop reason: HOSPADM

## 2018-07-26 RX ORDER — M-VIT,TX,IRON,MINS/CALC/FOLIC 27MG-0.4MG
1 TABLET ORAL DAILY
COMMUNITY
End: 2021-05-05

## 2018-07-26 RX ORDER — FENTANYL CITRATE 50 UG/ML
50 INJECTION, SOLUTION INTRAMUSCULAR; INTRAVENOUS ONCE
Status: COMPLETED | OUTPATIENT
Start: 2018-07-26 | End: 2018-07-26

## 2018-07-26 RX ORDER — NICOTINE POLACRILEX 4 MG
15 LOZENGE BUCCAL PRN
Status: DISCONTINUED | OUTPATIENT
Start: 2018-07-26 | End: 2018-07-31 | Stop reason: HOSPADM

## 2018-07-26 RX ORDER — PETROLATUM 42 G/100G
OINTMENT TOPICAL 2 TIMES DAILY PRN
Status: DISCONTINUED | OUTPATIENT
Start: 2018-07-26 | End: 2018-07-28 | Stop reason: SDUPTHER

## 2018-07-26 RX ADMIN — GABAPENTIN 600 MG: 600 TABLET, FILM COATED ORAL at 23:19

## 2018-07-26 RX ADMIN — FENTANYL CITRATE 50 MCG: 50 INJECTION INTRAMUSCULAR; INTRAVENOUS at 16:03

## 2018-07-26 RX ADMIN — Medication 10 ML: at 22:26

## 2018-07-26 RX ADMIN — ACETAMINOPHEN 650 MG: 325 TABLET, FILM COATED ORAL at 23:19

## 2018-07-26 RX ADMIN — ROSUVASTATIN CALCIUM 10 MG: 10 TABLET, FILM COATED ORAL at 23:18

## 2018-07-26 RX ADMIN — HYDRALAZINE HYDROCHLORIDE 100 MG: 50 TABLET, FILM COATED ORAL at 23:19

## 2018-07-26 RX ADMIN — MONTELUKAST SODIUM 10 MG: 10 TABLET, FILM COATED ORAL at 23:19

## 2018-07-26 RX ADMIN — MOMETASONE FUROATE AND FORMOTEROL FUMARATE DIHYDRATE 2 PUFF: 200; 5 AEROSOL RESPIRATORY (INHALATION) at 23:18

## 2018-07-26 RX ADMIN — BUMETANIDE 1 MG: 0.25 INJECTION INTRAMUSCULAR; INTRAVENOUS at 21:06

## 2018-07-26 RX ADMIN — SODIUM CHLORIDE 500 ML: 9 INJECTION, SOLUTION INTRAVENOUS at 16:04

## 2018-07-26 RX ADMIN — SACUBITRIL AND VALSARTAN 1 TABLET: 24; 26 TABLET, FILM COATED ORAL at 23:19

## 2018-07-26 RX ADMIN — NITROGLYCERIN 0.5 INCH: 20 OINTMENT TOPICAL at 16:04

## 2018-07-26 RX ADMIN — METOPROLOL SUCCINATE 25 MG: 25 TABLET, FILM COATED, EXTENDED RELEASE ORAL at 23:20

## 2018-07-26 ASSESSMENT — PAIN DESCRIPTION - ONSET: ONSET: ON-GOING

## 2018-07-26 ASSESSMENT — ENCOUNTER SYMPTOMS
COUGH: 1
HEMOPTYSIS: 0
NAUSEA: 1
HEARTBURN: 0
BLOOD IN STOOL: 1
CONSTIPATION: 0
DIARRHEA: 1
SPUTUM PRODUCTION: 1
SHORTNESS OF BREATH: 1
VOMITING: 0
BACK PAIN: 1
ABDOMINAL PAIN: 1
WHEEZING: 0
ORTHOPNEA: 1
BLURRED VISION: 0
DOUBLE VISION: 0

## 2018-07-26 ASSESSMENT — PAIN SCALES - GENERAL
PAINLEVEL_OUTOF10: 9

## 2018-07-26 ASSESSMENT — PAIN DESCRIPTION - PAIN TYPE
TYPE: ACUTE PAIN
TYPE: ACUTE PAIN

## 2018-07-26 ASSESSMENT — PAIN DESCRIPTION - DESCRIPTORS
DESCRIPTORS: SHARP
DESCRIPTORS: DISCOMFORT;TENDER;SHARP

## 2018-07-26 ASSESSMENT — PAIN DESCRIPTION - ORIENTATION: ORIENTATION: ANTERIOR;RIGHT

## 2018-07-26 ASSESSMENT — PAIN DESCRIPTION - LOCATION
LOCATION: CHEST;ABDOMEN
LOCATION: HEAD;ABDOMEN

## 2018-07-26 ASSESSMENT — PAIN DESCRIPTION - FREQUENCY: FREQUENCY: CONTINUOUS

## 2018-07-26 NOTE — ED PROVIDER NOTES
Upper gastrointestinal endoscopy (12/20/12); Coronary artery bypass graft; layer wound closure (Left, [id number]); Echo Complete (10/9/2013); polysomnography (1/2016); liver biopsy (1/8/2016); bronchial brush biopsy (1/25/2013); Breast surgery (2000); Cholecystectomy (YRS AGO); Cardiac surgery (12/2011); Cardiac catheterization (04/20/2015); Hysterectomy; joint replacement (2011); Upper gastrointestinal endoscopy (12/23/2016); Colonoscopy (11/15/2013); Colonoscopy (12/23/2016); Upper gastrointestinal endoscopy (02/08/2017); Endoscopy, colon, diagnostic (04/18/2017); and Gallbladder surgery. Social History:  reports that she has been smoking Cigarettes. She started smoking about 43 years ago. She has a 10.00 pack-year smoking history. She has never used smokeless tobacco. She reports that she uses drugs, including Marijuana. She reports that she does not drink alcohol. Family History: family history includes Asthma in her father; Cancer in her mother. The patients home medications have been reviewed. Allergies: Latex; Bee venom; Dilaudid [hydromorphone hcl]; Dye [iodides]; Percocet [oxycodone-acetaminophen]; Keflex [cephalexin]; Lasix [furosemide]; Levaquin [levofloxacin in d5w]; Lipitor; Lyrica [pregabalin]; Morphine; Naproxen; Nefazodone; Norvasc [amlodipine]; Oxycodone-acetaminophen; Shellfish-derived products; and Trazodone and nefazodone    -------------------------------------------------- RESULTS -------------------------------------------------  All laboratory and radiology results have been personally reviewed by myself   LABS:  No results found for this visit on 07/26/18. RADIOLOGY:  Interpreted by Radiologist.  XR CHEST PORTABLE    (Results Pending)       EKG: This EKG is signed and interpreted by me.     Rate: 81  Rhythm: Sinus  Interpretation: RBBB  Comparison: was normal    ------------------------- NURSING NOTES AND VITALS REVIEWED ---------------------------   The nursing notes within the ED encounter and vital signs as below have been reviewed. BP (!) 176/86   Pulse 87   Temp 97.9 °F (36.6 °C)   Resp 24   Ht 5' 2\" (1.575 m)   Wt 256 lb (116.1 kg)   LMP 01/01/1990   SpO2 92%   BMI 46.82 kg/m²   Oxygen Saturation Interpretation: Normal    ---------------------------------------------------PHYSICAL EXAM--------------------------------------    Constitutional/General: Alert and oriented x3, well appearing, non toxic in NAD. Afebrile. Head: NC/AT  Eyes: PERRL, EOMI  HENT: Oropharynx clear. Handling secretions. Neck: Supple, full ROM  Pulmonary: Lungs clear to auscultation bilaterally, no wheezes, rales, or rhonchi. Not in respiratory distress. Cardiovascular: Regular rate. Regular rhythm. No murmurs. No gallops, or rubs. 2+ distal pulses. Abdomen: Obese. Soft, mid abd tenderness, non distended. No guarding, rebound, or rigidity. Extremities: Moves all extremities x 4. Mottled legs with mild edema, good cap refill. Skin: Warm and dry without rash. Neurologic: GCS 15, no focal deficits, systemic strength 5/5 to all extremities symmetrically, CN II-XII grossly intact. Psych: Speech and behavior appropriate.       ------------------------------ ED COURSE/MEDICAL DECISION MAKING----------------------  Medications   nitroglycerin (NITRO-BID) 2 % ointment 0.5 inch (not administered)   0.9 % sodium chloride bolus (not administered)   fentaNYL (SUBLIMAZE) injection 50 mcg (not administered)            Medical Decision Making:    Pt is a 60year old female presenting to the ED for chest pain. She has exam findings as detailed above. She has a cardiac Hx including PSHx of CABG. Pt is in NAD at this time. She will be given IV fluids and the above medications. Labs and imaging ordered. Addendum: Patient states the chest pain is only slightly improved. Patient admits she can take Bumex. Bumex ordered. Repeat EKG ordered.   Consultation:  7:23 PM  I spoke with Dr. Annelise Thakur and the patient will be admitted      EKG: #2  This EKG is signed and interpreted by me. Rate: 69  Rhythm: Sinus  Interpretation: Sinus with right bundle-branch block  Comparison: stable as compared to patient's most recent EKG      Counseling: The emergency provider has spoken with the patient and discussed todays results, in addition to providing specific details for the plan of care and counseling regarding the diagnosis and prognosis. Questions are answered at this time and they are agreeable with the plan.      --------------------------------- IMPRESSION AND DISPOSITION ---------------------------------    IMPRESSION  1. Chest pain, unspecified type    2. Congestive heart failure, unspecified congestive heart failure chronicity, unspecified congestive heart failure type ([facility]        DISPOSITION  Disposition: Admit to telemetry  Patient condition is fair    7/26/18, 3:04 PM.    This note is prepared by Isis Padilla, acting as Scribe for Alexandre CompanyDO. Martir Oneal, DO:  The scribe's documentation has been prepared under my direction and personally reviewed by me in its entirety. I confirm that the note above accurately reflects all work, treatment, procedures, and medical decision making performed by me.              Alexander Company DO  07/26/18 1944

## 2018-07-26 NOTE — H&P
Diagnosis Date    Asthma     C. difficile diarrhea 2015    CAD (coronary artery disease) 2011    Cellulitis and abscess of trunk 4/14/2015    Chronic back pain     Chronic kidney disease     Chronic systolic congestive heart failure ([address] 10/4/2013    Chronic venous insufficiency 10/11/2017    COPD (chronic obstructive pulmonary disease) (HCC)     Depression     Diabetes mellitus (HCC)     Diabetic neuropathy ([address]     Diverticulosis     Fatty liver 1/8/2016    per US    Glaucoma, open angle 2/1/2016    Mild-OU    Hepatic encephalopathy ([address] 02/07/2016    resolved    Hiatal hernia     History of blood transfusion     Hyperlipidemia     Hyperplastic colon polyp     Hypertension     Incontinence     Liver mass     Morbid obesity ([address]     Movement disorder     Myocardial infarction 2011    Osteoarthritis, generalized     Pain in both lower legs 10/11/2017    Peripheral vascular disease ([address]     Pneumonia 1/26/2014    Pulmonary edema     resolved    PVD (peripheral vascular disease) with claudication ([address] 8/30/2017    Sleep apnea     uses BI PAP    Tobacco abuse     Tobacco abuse 10/11/2017    Tubular adenoma polyp of rectum     Urinary tract infection due to ESBL Klebsiella 03/08/2017    Ventral hernia        Past Surgical History:        Procedure Laterality Date    BREAST SURGERY  2000    bilateral reduction    BRONCHIAL BRUSH BIOPSY  1/25/2013    Dr Annalee Henderson  04/20/2015    Dr. Nayely Mcwilliams  12/2011     DR. Rahel Salvador,  follows Dr Babita Dillon  11/15/2013    Dr Paul Simms COLONOSCOPY  12/23/2016    Dr Yaima Saez adenoma & hyperplastic polyps, melanosis coli (repeat one year 12/2017)    CORONARY ARTERY BYPASS GRAFT      ECHO COMPLETE  10/9/2013         ENDOSCOPY, COLON, DIAGNOSTIC  04/18/2017    GALLBLADDER SURGERY      gallstones removed, [facility] 8/2017    HYSTERECTOMY      JOINT 9.0       Recent Labs      07/26/18   1545   WBC  6.7   RBC  4.58   HGB  14.3   HCT  44.4   MCV  96.9   MCH  31.2   MCHC  32.2   RDW  13.6   PLT  258   MPV  10.9       CBC:   Lab Results   Component Value Date    WBC 6.7 07/26/2018    RBC 4.58 07/26/2018    HGB 14.3 07/26/2018    HCT 44.4 07/26/2018    MCV 96.9 07/26/2018    RDW 13.6 07/26/2018     07/26/2018     BMP:    Lab Results   Component Value Date     07/26/2018    K 4.5 07/26/2018     07/26/2018    CO2 30 07/26/2018    BUN 15 07/26/2018     FLP:    Lab Results   Component Value Date    CHOL 222 06/05/2018    TRIG 84 06/05/2018    HDL 38 06/05/2018     U/A:  No components found for: Tabitha Frank, USPGRAV, UPH, UPROTEIN, UGLUCOSE, UKETONE, UBILI, UBLOOD, [place], [place], [place], [facility], [place], [place], [place], [facility], [place]  TSH:    Lab Results   Component Value Date    TSH 1.560 11/07/2017       Imaging Studies:    Xr Chest Portable  Result Date: 7/26/2018  Cardiac megaly with perihilar pulmonary vascular congestion and edema. No large pleural fluid collections    Resident's Assessment and Plan     The patient is a 60 y.o. female with a PMHx of HTN, HLD, CAD s/p CABG in 2011, AMI, PAD, HFrEF (initially 25%, but on GDMT- recent EF 65% on 06/2018), Type II DM, COPD, DAYAN, Nicotine abuse, and Obesity. Chest pain-likely Musculoskeletal  - Hx of fall, pain reproducible on palpation of chest wall  - Pain control with tylenol for now  - She does have significant cardiac history, will obtain 2 more sets of cardiac enzymes to r/o MI    CAD s/p CABG  -Continue ASA and Crestor (she did not tolerate high intensity statin)    HFrEF in acute exacerbation  - Worsening cough, sob, crackles auscultated  in lower lung bases, CXR -signs of vascular congestion, pro-BNP 1268  - She does have some symptoms suggestive of upper respiratory tract infection.  Will get RFA and procalcitonin  - Resume meds - imdur, BB, entresto, bumex  - Hold oral bumex, switch to IV Bumex 2mg daily  - Monitor I/O and renal function (creatinine slightly elevated)    HTN  - resume home medications  - monitor blood pressure    COPD  - Continue bronchodilators    DAYAN  - Bi-pap ordered    Chronic diarrhea with rectal bleeding  - Rectal exam showed no evidence of hemorrhoids or bleeding  -Colonoscopy with polypectomy was done in April, surg path consistent with inflammatory and hyperplastic polyps  - Follow up with GI as outpatient    CKD I - II  - Creatinine 1.3 - 1.5 mg/dl in 2017, creatinine now 1 - 1.3. GFR > 60, microalb/crea >300 in May  - Likely from HTN, DM  - Continue ARB (Entresto)    Type II DM  - Well controlled (IjI9V-3.9 in 05/2018, as high as 9.2 in  2016)  - Continue Degludec 80 U bd and Aspart 35 tid with meals. We have changed her aspart to humalog. Degludec has no interchange with lantus per pharmacy. Her family will bring her degludec tomorrow. We have asked her family to bring Deglutec tomorrow. - POCT  - Hypoglycemia protocol    PAD   - continue ASA, statin      DVT prophylaxis/ GI prophylaxis: Lovenox/Diet  Disposition: telemetry  PT/OT: will consult      Halie Ly MD, PGY-1     Attending physician: Dr. Maria Luisa Alston    Senior Resident Statement  I have seen and examined the patient with the intern. I have discussed the case, including pertinent history and exam findings with the intern. I agree with the assessment, plan and orders as documented by the intern. I have also discussed the plan with the attending on call, Dr. Dodie Louise. Changes to her note have been made as necessary. Vamsi Pickering M.D., PGY 2    Attending physician: Dr. Maria Luisa Alston      Attending Physician Statement  I have discussed the case, including pertinent history and exam findings with the resident. I have seen and examined the patient and the key elements of the encounter have been performed by me.   I agree with the assessment, plan and orders as documented by the

## 2018-07-27 ENCOUNTER — APPOINTMENT (OUTPATIENT)
Dept: GENERAL RADIOLOGY | Age: 63
DRG: 291 | End: 2018-07-27
Payer: MEDICARE

## 2018-07-27 LAB
ANION GAP SERPL CALCULATED.3IONS-SCNC: 10 MMOL/L (ref 7–16)
BASOPHILS ABSOLUTE: 0.02 E9/L (ref 0–0.2)
BASOPHILS RELATIVE PERCENT: 0.3 % (ref 0–2)
BUN BLDV-MCNC: 15 MG/DL (ref 8–23)
CALCIUM SERPL-MCNC: 8.7 MG/DL (ref 8.6–10.2)
CHLORIDE BLD-SCNC: 105 MMOL/L (ref 98–107)
CO2: 29 MMOL/L (ref 22–29)
CREAT SERPL-MCNC: 1 MG/DL (ref 0.5–1)
EOSINOPHILS ABSOLUTE: 0.15 E9/L (ref 0.05–0.5)
EOSINOPHILS RELATIVE PERCENT: 2.1 % (ref 0–6)
FILM ARRAY ADENOVIRUS: NORMAL
FILM ARRAY BORDETELLA PERTUSSIS: NORMAL
FILM ARRAY CHLAMYDOPHILIA PNEUMONIAE: NORMAL
FILM ARRAY CORONAVIRUS 229E: NORMAL
FILM ARRAY CORONAVIRUS HKU1: NORMAL
FILM ARRAY CORONAVIRUS NL63: NORMAL
FILM ARRAY CORONAVIRUS OC43: NORMAL
FILM ARRAY INFLUENZA A VIRUS 09H1: NORMAL
FILM ARRAY INFLUENZA A VIRUS H1: NORMAL
FILM ARRAY INFLUENZA A VIRUS H3: NORMAL
FILM ARRAY INFLUENZA A VIRUS: NORMAL
FILM ARRAY INFLUENZA B: NORMAL
FILM ARRAY METAPNEUMOVIRUS: NORMAL
FILM ARRAY MYCOPLASMA PNEUMONIAE: NORMAL
FILM ARRAY PARAINFLUENZA VIRUS 1: NORMAL
FILM ARRAY PARAINFLUENZA VIRUS 2: NORMAL
FILM ARRAY PARAINFLUENZA VIRUS 3: NORMAL
FILM ARRAY PARAINFLUENZA VIRUS 4: NORMAL
FILM ARRAY RESPIRATORY SYNCITIAL VIRUS: NORMAL
FILM ARRAY RHINOVIRUS/ENTEROVIRUS: NORMAL
GFR AFRICAN AMERICAN: >60
GFR NON-AFRICAN AMERICAN: >60 ML/MIN/1.73
GLUCOSE BLD-MCNC: 143 MG/DL (ref 74–109)
GRAM STAIN ORDERABLE: NORMAL
HBA1C MFR BLD: 6.1 % (ref 4–5.6)
HCT VFR BLD CALC: 42.9 % (ref 34–48)
HEMOGLOBIN: 13.7 G/DL (ref 11.5–15.5)
IMMATURE GRANULOCYTES #: 0.02 E9/L
IMMATURE GRANULOCYTES %: 0.3 % (ref 0–5)
LYMPHOCYTES ABSOLUTE: 1.29 E9/L (ref 1.5–4)
LYMPHOCYTES RELATIVE PERCENT: 18.1 % (ref 20–42)
MAGNESIUM: 1.7 MG/DL (ref 1.6–2.6)
MAGNESIUM: 1.8 MG/DL (ref 1.6–2.6)
MCH RBC QN AUTO: 31.1 PG (ref 26–35)
MCHC RBC AUTO-ENTMCNC: 31.9 % (ref 32–34.5)
MCV RBC AUTO: 97.5 FL (ref 80–99.9)
METER GLUCOSE: 105 MG/DL (ref 70–110)
METER GLUCOSE: 168 MG/DL (ref 70–110)
METER GLUCOSE: 227 MG/DL (ref 70–110)
METER GLUCOSE: 238 MG/DL (ref 70–110)
METER GLUCOSE: 96 MG/DL (ref 70–110)
MONOCYTES ABSOLUTE: 0.49 E9/L (ref 0.1–0.95)
MONOCYTES RELATIVE PERCENT: 6.9 % (ref 2–12)
NEUTROPHILS ABSOLUTE: 5.16 E9/L (ref 1.8–7.3)
NEUTROPHILS RELATIVE PERCENT: 72.3 % (ref 43–80)
PDW BLD-RTO: 13.5 FL (ref 11.5–15)
PHOSPHORUS: 3.1 MG/DL (ref 2.5–4.5)
PLATELET # BLD: 243 E9/L (ref 130–450)
PMV BLD AUTO: 11 FL (ref 7–12)
POTASSIUM REFLEX MAGNESIUM: 3.6 MMOL/L (ref 3.5–5)
PRO-BNP: 1049 PG/ML (ref 0–125)
PROCALCITONIN: 0.03 NG/ML (ref 0–0.08)
RBC # BLD: 4.4 E12/L (ref 3.5–5.5)
SODIUM BLD-SCNC: 144 MMOL/L (ref 132–146)
TROPONIN: <0.01 NG/ML (ref 0–0.03)
TROPONIN: <0.01 NG/ML (ref 0–0.03)
WBC # BLD: 7.1 E9/L (ref 4.5–11.5)

## 2018-07-27 PROCEDURE — 82962 GLUCOSE BLOOD TEST: CPT

## 2018-07-27 PROCEDURE — G8979 MOBILITY GOAL STATUS: HCPCS

## 2018-07-27 PROCEDURE — 94660 CPAP INITIATION&MGMT: CPT

## 2018-07-27 PROCEDURE — 6360000002 HC RX W HCPCS: Performed by: INTERNAL MEDICINE

## 2018-07-27 PROCEDURE — 97161 PT EVAL LOW COMPLEX 20 MIN: CPT

## 2018-07-27 PROCEDURE — 2700000000 HC OXYGEN THERAPY PER DAY

## 2018-07-27 PROCEDURE — 6370000000 HC RX 637 (ALT 250 FOR IP): Performed by: INTERNAL MEDICINE

## 2018-07-27 PROCEDURE — 36415 COLL VENOUS BLD VENIPUNCTURE: CPT

## 2018-07-27 PROCEDURE — 83880 ASSAY OF NATRIURETIC PEPTIDE: CPT

## 2018-07-27 PROCEDURE — 85025 COMPLETE CBC W/AUTO DIFF WBC: CPT

## 2018-07-27 PROCEDURE — 83036 HEMOGLOBIN GLYCOSYLATED A1C: CPT

## 2018-07-27 PROCEDURE — 80048 BASIC METABOLIC PNL TOTAL CA: CPT

## 2018-07-27 PROCEDURE — 71110 X-RAY EXAM RIBS BIL 3 VIEWS: CPT

## 2018-07-27 PROCEDURE — 84100 ASSAY OF PHOSPHORUS: CPT

## 2018-07-27 PROCEDURE — 84145 PROCALCITONIN (PCT): CPT

## 2018-07-27 PROCEDURE — 94664 DEMO&/EVAL PT USE INHALER: CPT

## 2018-07-27 PROCEDURE — G0378 HOSPITAL OBSERVATION PER HR: HCPCS

## 2018-07-27 PROCEDURE — 2580000003 HC RX 258: Performed by: INTERNAL MEDICINE

## 2018-07-27 PROCEDURE — 84484 ASSAY OF TROPONIN QUANT: CPT

## 2018-07-27 PROCEDURE — 2500000003 HC RX 250 WO HCPCS: Performed by: INTERNAL MEDICINE

## 2018-07-27 PROCEDURE — G8988 SELF CARE GOAL STATUS: HCPCS

## 2018-07-27 PROCEDURE — G8987 SELF CARE CURRENT STATUS: HCPCS

## 2018-07-27 PROCEDURE — 97165 OT EVAL LOW COMPLEX 30 MIN: CPT

## 2018-07-27 PROCEDURE — G8978 MOBILITY CURRENT STATUS: HCPCS

## 2018-07-27 PROCEDURE — 83735 ASSAY OF MAGNESIUM: CPT

## 2018-07-27 RX ORDER — MAGNESIUM SULFATE IN WATER 40 MG/ML
2 INJECTION, SOLUTION INTRAVENOUS ONCE
Status: COMPLETED | OUTPATIENT
Start: 2018-07-27 | End: 2018-07-27

## 2018-07-27 RX ORDER — TRAMADOL HYDROCHLORIDE 50 MG/1
50 TABLET ORAL EVERY 6 HOURS PRN
Status: DISPENSED | OUTPATIENT
Start: 2018-07-27 | End: 2018-07-29

## 2018-07-27 RX ORDER — ACETAMINOPHEN 325 MG/1
650 TABLET ORAL ONCE
Status: COMPLETED | OUTPATIENT
Start: 2018-07-27 | End: 2018-07-27

## 2018-07-27 RX ORDER — IPRATROPIUM BROMIDE AND ALBUTEROL SULFATE 2.5; .5 MG/3ML; MG/3ML
1 SOLUTION RESPIRATORY (INHALATION) EVERY 6 HOURS PRN
Status: DISCONTINUED | OUTPATIENT
Start: 2018-07-27 | End: 2018-07-30

## 2018-07-27 RX ADMIN — ISOSORBIDE MONONITRATE 60 MG: 60 TABLET ORAL at 08:16

## 2018-07-27 RX ADMIN — TRAMADOL HYDROCHLORIDE 50 MG: 50 TABLET, FILM COATED ORAL at 23:19

## 2018-07-27 RX ADMIN — GABAPENTIN 600 MG: 600 TABLET, FILM COATED ORAL at 13:56

## 2018-07-27 RX ADMIN — Medication 10 ML: at 21:00

## 2018-07-27 RX ADMIN — INSULIN LISPRO 35 UNITS: 100 INJECTION, SOLUTION INTRAVENOUS; SUBCUTANEOUS at 08:18

## 2018-07-27 RX ADMIN — ACETAMINOPHEN 650 MG: 325 TABLET, FILM COATED ORAL at 02:05

## 2018-07-27 RX ADMIN — SACUBITRIL AND VALSARTAN 1 TABLET: 24; 26 TABLET, FILM COATED ORAL at 20:37

## 2018-07-27 RX ADMIN — METOPROLOL SUCCINATE 25 MG: 25 TABLET, FILM COATED, EXTENDED RELEASE ORAL at 20:37

## 2018-07-27 RX ADMIN — ENOXAPARIN SODIUM 40 MG: 100 INJECTION SUBCUTANEOUS at 08:17

## 2018-07-27 RX ADMIN — TRAMADOL HYDROCHLORIDE 50 MG: 50 TABLET, FILM COATED ORAL at 17:00

## 2018-07-27 RX ADMIN — HYDRALAZINE HYDROCHLORIDE 100 MG: 50 TABLET, FILM COATED ORAL at 20:36

## 2018-07-27 RX ADMIN — MOMETASONE FUROATE AND FORMOTEROL FUMARATE DIHYDRATE 2 PUFF: 200; 5 AEROSOL RESPIRATORY (INHALATION) at 23:19

## 2018-07-27 RX ADMIN — IPRATROPIUM BROMIDE AND ALBUTEROL SULFATE 1 AMPULE: 2.5; .5 SOLUTION RESPIRATORY (INHALATION) at 02:44

## 2018-07-27 RX ADMIN — MONTELUKAST SODIUM 10 MG: 10 TABLET, FILM COATED ORAL at 20:36

## 2018-07-27 RX ADMIN — MAGNESIUM SULFATE HEPTAHYDRATE 2 G: 40 INJECTION, SOLUTION INTRAVENOUS at 14:09

## 2018-07-27 RX ADMIN — GABAPENTIN 600 MG: 600 TABLET, FILM COATED ORAL at 08:17

## 2018-07-27 RX ADMIN — ASPIRIN 81 MG CHEWABLE TABLET 81 MG: 81 TABLET CHEWABLE at 08:17

## 2018-07-27 RX ADMIN — GABAPENTIN 600 MG: 600 TABLET, FILM COATED ORAL at 20:37

## 2018-07-27 RX ADMIN — HYDRALAZINE HYDROCHLORIDE 100 MG: 50 TABLET, FILM COATED ORAL at 08:16

## 2018-07-27 RX ADMIN — METOPROLOL SUCCINATE 25 MG: 25 TABLET, FILM COATED, EXTENDED RELEASE ORAL at 08:16

## 2018-07-27 RX ADMIN — BUMETANIDE 2 MG: 0.25 INJECTION INTRAMUSCULAR; INTRAVENOUS at 08:17

## 2018-07-27 RX ADMIN — SACUBITRIL AND VALSARTAN 1 TABLET: 24; 26 TABLET, FILM COATED ORAL at 08:17

## 2018-07-27 RX ADMIN — HYDRALAZINE HYDROCHLORIDE 100 MG: 50 TABLET, FILM COATED ORAL at 13:56

## 2018-07-27 RX ADMIN — MOMETASONE FUROATE AND FORMOTEROL FUMARATE DIHYDRATE 2 PUFF: 200; 5 AEROSOL RESPIRATORY (INHALATION) at 14:04

## 2018-07-27 RX ADMIN — Medication 10 ML: at 08:22

## 2018-07-27 RX ADMIN — ACETAMINOPHEN 650 MG: 325 TABLET, FILM COATED ORAL at 09:30

## 2018-07-27 RX ADMIN — ROSUVASTATIN CALCIUM 10 MG: 10 TABLET, FILM COATED ORAL at 20:37

## 2018-07-27 RX ADMIN — INSULIN LISPRO 35 UNITS: 100 INJECTION, SOLUTION INTRAVENOUS; SUBCUTANEOUS at 17:09

## 2018-07-27 ASSESSMENT — PAIN DESCRIPTION - DESCRIPTORS
DESCRIPTORS: JABBING
DESCRIPTORS: PRESSURE;JABBING
DESCRIPTORS: SORE
DESCRIPTORS: SORE

## 2018-07-27 ASSESSMENT — PAIN DESCRIPTION - ONSET: ONSET: ON-GOING

## 2018-07-27 ASSESSMENT — PAIN SCALES - GENERAL
PAINLEVEL_OUTOF10: 6
PAINLEVEL_OUTOF10: 10
PAINLEVEL_OUTOF10: 6
PAINLEVEL_OUTOF10: 10

## 2018-07-27 ASSESSMENT — PAIN DESCRIPTION - LOCATION
LOCATION: LEG
LOCATION: LEG
LOCATION: ABDOMEN
LOCATION: ABDOMEN

## 2018-07-27 ASSESSMENT — PAIN DESCRIPTION - PAIN TYPE
TYPE: ACUTE PAIN

## 2018-07-27 ASSESSMENT — PAIN DESCRIPTION - ORIENTATION
ORIENTATION: MID;RIGHT
ORIENTATION: ANTERIOR
ORIENTATION: MID;RIGHT

## 2018-07-27 ASSESSMENT — PAIN DESCRIPTION - FREQUENCY: FREQUENCY: INTERMITTENT

## 2018-07-27 NOTE — CARE COORDINATION
Social Work/Discharge Planning:    MISSY met with pt and pt daughter Marcelo Hurd ([phone]). Pt stated that she lives alone in a 1 story home. PTA pt stated that she has a cane, walker, rollator, bedside commode and showerchair. Pt stated that she has a [employer] though [address]. Pt stated that she has hx with SNF however was unable to recall SNF name at this time. Pt stated that she is active with [facility] and has an  [address] through [employer] that assist 3 hrs in the morning and 3 hrs in the evening. Per Past chart notes pt aides are through [employer] [facility] [employer]. PCP is Dr. Main [address] and primary pharmacy is [email or web address]. Pt stated that plan at discharge is to return home with [facility]. Pt stated that her aide or her daughter can assist if needed. Pt stated that her daughter can provide transportation home. MISSY spoke with Derek at [facility]. Derek confirmed pt is active with [facility]. Derek stated that pt is observation at this time and will not need VIRGIE orders. Derek stated that pt will need [address] orders if pt is admitted inpatient or if pt need new [address] orders. MISSY spoke with Jamey Chandra at [facility] stated that pt is active with [address] will need notified of pt discharge. Will need to notify [facility] of pt discharge at ext. [phone], Will need to notify [address] of pt discharge at [phone]. MISSY will follow.       Electronically signed by SINDHU Crespo on 7/27/2018 at 10:51 AM

## 2018-07-27 NOTE — PROGRESS NOTES
Physical Therapy    Facility/Department: [address] [facility]  Initial Assessment    NAME: Leilani Cobian  : 1955  MRN: [id number]    Date of Service: 2018     Evaluating Therapist: Luh Ba PT, DPT    Equipment Recommendations: None    Room #: 0132Y  DIAGNOSIS: Chest Pain  PRECAUTIONS: Falls    Social:  Pt lives alone in a 1 story floor plan 2 steps and 2 rails to enter. Pt reports that she has aides to assist 5 days/wk for 6 hours/day. Prior to admission pt walked with [address] in the house and rollator ww in the community. Initial Evaluation  Date: 18 Treatment  Date:     Short Term/ Long Term   Goals   Was pt agreeable to Eval/treatment? Yes     Does pt have pain? 9/10 back and neck pain     Bed Mobility  Rolling: Supervision  Supine to sit: Supervision  Sit to supine: Supervision  Scooting: Supervision  Independent   Transfers Sit to stand: SBA  Stand to sit: SBA  Stand pivot: SBA  Modified Independent   Ambulation   50 feet with SBA with ww  >150 feet with modified Independent with ww   Stair negotiation: ascended and descended NT  2 steps with 2 rail with modified Independent   AM-PAC        Pt is alert and Oriented x 3  BLE ROM is WFL. BLE strength is grossly 5/5. Balance: Supervision sitting EOB. SBA static and dynamic standing with ww. Sensation: L hand numbness due to CTS and B fee numbness. Edema: No notable edema. Endurance: Fair  Chair alarm: No     ASSESSMENT  Pt displays functional ability as noted in the objective portion of this evaluation. Comments/Treatment:  Pt found sitting up in a chair and was agreeable to evaluation. Orthostatics were taken on pt and found to be negative. Pt's SpO2 prior to functional mobility was 92% on RA. Pt's ambulation distance limited due to SOB and back pain. Pt's SpO2 during ambulation was 98% on RA. Pt's SpO2 after functional mobility on RA was 91-92%. Pt left sitting up in a chair with call light in reach.     Patient education  Pt educated on safety with functional mobility and deep breathing exercises. Patient response to education:   Pt verbalized understanding Pt demonstrated skill Pt requires further education in this area   x x x     Rehab potential is Good for reaching above PT goals. Pts/ family goals   1. To return home. Patient and or family understand(s) diagnosis, prognosis, and plan of care. PLAN  PT care will be provided in accordance with the objectives noted above. Whenever appropriate, clear delegation orders will be provided for nursing staff. Exercises and functional mobility practice will be used as well as appropriate assistive devices or modalities to obtain goals. Patient and family education will also be administered as needed. Frequency of treatments will be 2-5x/week x 1-3 days.     Time in: 3817  Time out: Blayne MCLEAN DPT  License number:  [id number]

## 2018-07-27 NOTE — ED NOTES
Pt can be seen on heart monitor for 8203A by mary monitor tech. Transport to be requested.       Carlos Campbell, RITU  07/26/18 2100

## 2018-07-27 NOTE — PROGRESS NOTES
Pt states administered Tylenol did not touch her pain. States she is a 10/10 pain. Contacted attending provider for further pain management.

## 2018-07-27 NOTE — PROGRESS NOTES
OCCUPATIONAL THERAPY INITIAL EVALUATION      Date:2018  Patient Name: Ferny Tsai  MRN: [id number]  : 1955  Room: [address] [place] [address]     Evaluating OT: TU Farrell/L     Recommended Adaptive Equipment: none  AM-PAC Daily Activity Raw Score =  17/24  G-code = CK    Diagnosis:   1. Chest pain, unspecified type    2.  Congestive heart failure, unspecified congestive heart failure chronicity, unspecified congestive heart failure type [facility])       Patient Active Problem List   Diagnosis    Morbid obesity due to excess calories (HCC)    Hyperlipidemia    DAYAN and COPD overlap syndrome (HCC)    Vitamin D insufficiency    Chronic combined systolic and diastolic CHF (congestive heart failure) ([address]    Essential hypertension    GERD (gastroesophageal reflux disease)    Major depressive disorder, recurrent episode, mild ([address]    Diabetic polyneuropathy associated with type 2 diabetes mellitus (HCC)    Chronic passive hepatic congestion    Mixed incontinence urge and stress    Chronic back pain - d/t muscle spasm    Glaucoma, open angle    Elevated CA 19-9 level    Lumbar stenosis    Spondylosis 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    Lumbar disc herniation    Asymmetric septal hypertrophy (HCC)    Non-compliance    Hiatal hernia    Melanosis coli    Coronary artery disease involving native coronary artery of native heart without angina pectoris    DM2 (diabetes mellitus, type 2) ([address]    Cigarette smoker    Marijuana use, smoked    Ischemic cardiomyopathy    Type 2 diabetes mellitus with complication ([address]    PVD (peripheral vascular disease) with claudication (HCC)    Chronic venous insufficiency    Venous stasis of both lower extremities    NSTEMI (non-ST elevated myocardial infarction) ([address]    Chest pain      Past Medical History:   Past Medical History:   Diagnosis Date    Asthma     C. difficile diarrhea     CAD (coronary artery disease)     Dominance: R     Strength ROM Additional Info:    RUE   4/5 WFL  WFL  and  FMC/dexterity noted during ADL tasks     LUE 4/5 WFL  WFL  and  FMC/dexterity noted during ADL tasks   Sensation:  c/o numbness or tingling in fingers   Tone: wfl  Edema:  no     Functional Assessment:   Initial Status: Date: 7/27/18 Treatment: Date:    Feeding  Independent      Grooming  Stand by Assist        Upper Body Dressing Stand by Assist       Lower Body Dressing Mod Assist       Bathing UE: Minimal Assist    LE:  Moderate Assist    Toileting  NT  lenz      Bed Mobility  Supine to Sit: Independent    Scooting: Independent  Sit to Supine: Independent    Functional Transfers Sit to stand: Supervision   Stand pivot:  Supervision        Functional Mobility Stand by Assist with FWW functional distance in her room. Pt was steady but SOB with activity. Sit balance: wfl  Stand balance:  SBA  Endurance/Activity tolerance:  poor                              Comments/Treatment:  OT evaluation completed. Upon arrival pt sitting up in chair. Pt tested for orthostatic hypotension from PCA and had a negative results. Pt education on safe  functional transfers and mobility with FWW. At end of session pt up in chair. SpO2 remained >93% throughout session. with all devices within reach, all lines and tubes intact. Call light and phone within reach.     Assessment of Current Deficits   Functional mobility [x]  ROM [] Strength []  Cognition []  ADLs [x]   IADLs [x] Safety Awareness [x] Endurance [x]  Fine Motor Coordination [] Balance [x] Vision/perception [] Sensation []   Gross Motor Coordination []     Eval Complexity: low    Treatment Frequency:  PRN  1-3 days/week    Plan of Care:   ADL retraining [x]   Equipment needs [x]   Neuromuscular re-education [x] Energy Conservation Techniques [x]  Functional Transfer training [x] Patient and/or Family Education [x]  Functional Mobility training [x]  Environmental Modifications [x]  Cognitive re-training []   Compensatory techniques for ADLs [x]  Splinting Needs []   Positioning to improve overall function [x]  Therapeutic strengthening  [x]  Therapeutic activities  [x]   Other: []      Rehab Potential: good for stated goals    Patient / Family Goal: Not stated    Short Term Goals:  Time Frame:  1 week  Goal 1) Pt will increase UB ADL to indep  Goal 2) Pt will increase LB ADL to min A with use of AE as needed  Goal 3) Pt will increase functional transfers to mod indep for improved indep with ADL  Goal 4) Pt will increase functional mobility to mod indep for improved indep with ADL    Patient and/or family understands diagnosis, prognosis and plan of care: yes    [] Malnutrition indicators have been identified and nursing has been notified to ensure a dietitian consult is ordered.      Time In: 3:27  Evaluation + 8 Timed Treatment Minutes    Gennaro Akhtar, [place] [id number]

## 2018-07-27 NOTE — PLAN OF CARE
Problem: Pain:  Goal: Control of acute pain  Control of acute pain   Outcome: Ongoing      Problem: Falls - Risk of:  Goal: Will remain free from falls  Will remain free from falls   Outcome: Met This Shift      Problem: Physical Regulation:  Goal: Complications related to the disease process, condition or treatment will be avoided or minimized  Complications related to the disease process, condition or treatment will be avoided or minimized  Outcome: Met This Shift

## 2018-07-27 NOTE — PROGRESS NOTES
[facility]  Internal Medicine Residency Program  Progress Note - House Team 2    Patient:  Waqas Garza 60 y.o. female MRN: [id number]     Date of Service: 7/27/2018     CC: Chest pain  Overnight events: Patient was given tylenol for pain. Subjective     Patient was seen and examined at bedside. Patient still complains of pain but says it is better than yesterday. The pain is in her chest, right side of abdomen, and both legs. Also, patient states that SOB is better this morning. When asked about her incident prior to ED arrival, patient states that yesterday while she was walking to the bathroom, her legs \"gave in\" and she fell face forwards on the floor. She says that she has had these falls about 4-5 time in the past few months. She has \"shooting pain\" in her legs that started about a year ago along with low back pain. She also noticed slight swelling in her legs and also discoloration especially on the anterior side of her legs. Objective     Physical Exam:  · Vitals: /62   Pulse 71   Temp 98 °F (36.7 °C) (Temporal)   Resp 18   Ht 5' 2\" (1.575 m)   Wt 263 lb 1.6 oz (119.3 kg)   LMP 01/01/1990   SpO2 96%   BMI 48.12 kg/m²     · I & O - 24hr: No intake/output data recorded. · General Appearance: alert, appears stated age and cooperative  · HEENT:  Head: Normocephalic, no lesions, without obvious abnormality. · Neck: no adenopathy, no carotid bruit and no JVD  · Lung: rales bilaterally; tenderness on palpation of chest wall  · Heart: regular rate and rhythm, S1, S2 normal, no murmur, click, rub or gallop  · Abdomen: normal BS; soft, tenderness in RUQ; no guarding or rebound tenderness.   · Extremities:  Tenderness on palpation of both calves (R>L); hyperpigmentation on both anterior tibial area; strength 4/5 in lower extremities  · Musculokeletal: Joint swelling knee bilaterally, tenderness on palpation  · Neurologic: Mental status: drowsy, oriented  Subject  Pertinent Labs & Imaging Studies   paula  CBC:   Lab Results   Component Value Date    WBC 7.1 07/27/2018    RBC 4.40 07/27/2018    HGB 13.7 07/27/2018    HCT 42.9 07/27/2018    MCV 97.5 07/27/2018    MCH 31.1 07/27/2018    MCHC 31.9 07/27/2018    RDW 13.5 07/27/2018     07/27/2018    MPV 11.0 07/27/2018     BMP:    Lab Results   Component Value Date     07/27/2018    K 3.6 07/27/2018     07/27/2018    CO2 29 07/27/2018    BUN 15 07/27/2018    LABALBU 3.8 07/26/2018    LABALBU 5.2 12/01/2011    CREATININE 1.0 07/27/2018    CALCIUM 8.7 07/27/2018    GFRAA >60 07/27/2018    LABGLOM >60 07/27/2018    GLUCOSE 143 07/27/2018    GLUCOSE 181 12/01/2011     BUN/Creatinine:    Lab Results   Component Value Date    BUN 15 07/27/2018    CREATININE 1.0 07/27/2018     XR CHEST PORTABLE   Final Result   Cardiac megaly with perihilar pulmonary vascular congestion and edema. No large pleural fluid collections          CT Lumbar Spine Without Intravenous Contrast:    Vertebrae:  Osteopenia.  No acute lumbar spine fracture.  No acute    posttraumatic subluxation.     Discs/spinal canal/neural foramina:  Mild multilevel degenerative changes    present within the spine.  Stable minimal degenerative anterolisthesis and    vacuum disc at L4-L5 level.     Soft tissues:  No mass.     Vasculature:  Atherosclerotic arterial calcifications present.     Other findings:  No neoplastic osseous lesion present. Resident's Assessment and Plan     Dex Strickland is a 60 y.o. female presenting with chest pain.      Chest pain, RUQ pain-likely Musculoskeletal  - Likely 2/2 mechanical fall  - troponin (-) x1 -> obtain 2 more sets of troponin to r/o cardiac cause  - Pain control   - Xray bilateral ribs to r/o rib fracture    Frequent falls  -orthostatic BP and pulse to r/o orthostatic hypotension  - PT/OT evaluation       HFrEF in acute exacerbation  - SOB slightly improved   - ProBNP 1268 -> 1049  - Continue imdur, metoprolol, entresto, bumex  - Check daily weights      CAD s/p CABG  -Continue ASA and Crestor (she did not tolerate high intensity statin)       HTN  - /62  -continue antihypertensives  - monitor blood pressure     COPD  - Continue bronchodilators     DAYAN  - on Bi-pap      Chronic diarrhea with rectal bleeding  - Rectal exam showed no evidence of hemorrhoids or bleeding  -Colonoscopy with polypectomy was done in April, surg path consistent with inflammatory and hyperplastic polyps  - Follow up with GI as outpatient     CKD I - II  - Creatinine 1.0  - Continue ARB (Entresto)     Type II DM  - Continue Degludec 80 U bd and humalog 35 tid with meals   - POCT  - Hypoglycemia protocol     PAD       - continue ASA, statin  -patient will follow up at [facility]    PT/OT evaluation: ordered  DVT prophylaxis/ GI prophylaxis: lovenox/diet  Disposition: telemetry    Trey Mari MD, PGY-1  Attending physician: Dr. Levi Sanchez

## 2018-07-28 ENCOUNTER — APPOINTMENT (OUTPATIENT)
Dept: CT IMAGING | Age: 63
DRG: 291 | End: 2018-07-28
Payer: MEDICARE

## 2018-07-28 PROBLEM — R26.2 IMPAIRED AMBULATION: Status: ACTIVE | Noted: 2018-07-28

## 2018-07-28 LAB
ANION GAP SERPL CALCULATED.3IONS-SCNC: 10 MMOL/L (ref 7–16)
ANION GAP SERPL CALCULATED.3IONS-SCNC: 12 MMOL/L (ref 7–16)
BASOPHILS ABSOLUTE: 0.02 E9/L (ref 0–0.2)
BASOPHILS ABSOLUTE: 0.04 E9/L (ref 0–0.2)
BASOPHILS RELATIVE PERCENT: 0.3 % (ref 0–2)
BASOPHILS RELATIVE PERCENT: 0.5 % (ref 0–2)
BUN BLDV-MCNC: 13 MG/DL (ref 8–23)
BUN BLDV-MCNC: 14 MG/DL (ref 8–23)
CALCIUM SERPL-MCNC: 8.7 MG/DL (ref 8.6–10.2)
CALCIUM SERPL-MCNC: 8.8 MG/DL (ref 8.6–10.2)
CHLORIDE BLD-SCNC: 100 MMOL/L (ref 98–107)
CHLORIDE BLD-SCNC: 99 MMOL/L (ref 98–107)
CO2: 28 MMOL/L (ref 22–29)
CO2: 28 MMOL/L (ref 22–29)
CREAT SERPL-MCNC: 1 MG/DL (ref 0.5–1)
CREAT SERPL-MCNC: 1 MG/DL (ref 0.5–1)
EOSINOPHILS ABSOLUTE: 0.16 E9/L (ref 0.05–0.5)
EOSINOPHILS ABSOLUTE: 0.21 E9/L (ref 0.05–0.5)
EOSINOPHILS RELATIVE PERCENT: 2 % (ref 0–6)
EOSINOPHILS RELATIVE PERCENT: 2.6 % (ref 0–6)
GFR AFRICAN AMERICAN: >60
GFR AFRICAN AMERICAN: >60
GFR NON-AFRICAN AMERICAN: >60 ML/MIN/1.73
GFR NON-AFRICAN AMERICAN: >60 ML/MIN/1.73
GLUCOSE BLD-MCNC: 184 MG/DL (ref 74–109)
GLUCOSE BLD-MCNC: 204 MG/DL (ref 74–109)
HCT VFR BLD CALC: 42.6 % (ref 34–48)
HCT VFR BLD CALC: 42.8 % (ref 34–48)
HEMOGLOBIN: 13.2 G/DL (ref 11.5–15.5)
HEMOGLOBIN: 14 G/DL (ref 11.5–15.5)
IMMATURE GRANULOCYTES #: 0.02 E9/L
IMMATURE GRANULOCYTES #: 0.03 E9/L
IMMATURE GRANULOCYTES %: 0.3 % (ref 0–5)
IMMATURE GRANULOCYTES %: 0.4 % (ref 0–5)
LACTIC ACID: 1.1 MMOL/L (ref 0.5–2.2)
LYMPHOCYTES ABSOLUTE: 0.93 E9/L (ref 1.5–4)
LYMPHOCYTES ABSOLUTE: 1.01 E9/L (ref 1.5–4)
LYMPHOCYTES RELATIVE PERCENT: 11.7 % (ref 20–42)
LYMPHOCYTES RELATIVE PERCENT: 12.3 % (ref 20–42)
MAGNESIUM: 2 MG/DL (ref 1.6–2.6)
MCH RBC QN AUTO: 30.5 PG (ref 26–35)
MCH RBC QN AUTO: 31.5 PG (ref 26–35)
MCHC RBC AUTO-ENTMCNC: 31 % (ref 32–34.5)
MCHC RBC AUTO-ENTMCNC: 32.7 % (ref 32–34.5)
MCV RBC AUTO: 96.2 FL (ref 80–99.9)
MCV RBC AUTO: 98.4 FL (ref 80–99.9)
METER GLUCOSE: 124 MG/DL (ref 70–110)
METER GLUCOSE: 126 MG/DL (ref 70–110)
METER GLUCOSE: 159 MG/DL (ref 70–110)
METER GLUCOSE: 165 MG/DL (ref 70–110)
METER GLUCOSE: 185 MG/DL (ref 70–110)
MONOCYTES ABSOLUTE: 0.51 E9/L (ref 0.1–0.95)
MONOCYTES ABSOLUTE: 0.6 E9/L (ref 0.1–0.95)
MONOCYTES RELATIVE PERCENT: 6.4 % (ref 2–12)
MONOCYTES RELATIVE PERCENT: 7.3 % (ref 2–12)
NEUTROPHILS ABSOLUTE: 6.3 E9/L (ref 1.8–7.3)
NEUTROPHILS ABSOLUTE: 6.34 E9/L (ref 1.8–7.3)
NEUTROPHILS RELATIVE PERCENT: 76.9 % (ref 43–80)
NEUTROPHILS RELATIVE PERCENT: 79.3 % (ref 43–80)
PDW BLD-RTO: 13.2 FL (ref 11.5–15)
PDW BLD-RTO: 13.3 FL (ref 11.5–15)
PHOSPHORUS: 3.1 MG/DL (ref 2.5–4.5)
PLATELET # BLD: 243 E9/L (ref 130–450)
PLATELET # BLD: 274 E9/L (ref 130–450)
PMV BLD AUTO: 10.6 FL (ref 7–12)
PMV BLD AUTO: 10.9 FL (ref 7–12)
POTASSIUM REFLEX MAGNESIUM: 3.8 MMOL/L (ref 3.5–5)
POTASSIUM REFLEX MAGNESIUM: 3.9 MMOL/L (ref 3.5–5)
RBC # BLD: 4.33 E12/L (ref 3.5–5.5)
RBC # BLD: 4.45 E12/L (ref 3.5–5.5)
SODIUM BLD-SCNC: 138 MMOL/L (ref 132–146)
SODIUM BLD-SCNC: 139 MMOL/L (ref 132–146)
TROPONIN: <0.01 NG/ML (ref 0–0.03)
WBC # BLD: 7.9 E9/L (ref 4.5–11.5)
WBC # BLD: 8.2 E9/L (ref 4.5–11.5)

## 2018-07-28 PROCEDURE — 2580000003 HC RX 258: Performed by: INTERNAL MEDICINE

## 2018-07-28 PROCEDURE — 80048 BASIC METABOLIC PNL TOTAL CA: CPT

## 2018-07-28 PROCEDURE — 70450 CT HEAD/BRAIN W/O DYE: CPT

## 2018-07-28 PROCEDURE — 6370000000 HC RX 637 (ALT 250 FOR IP): Performed by: INTERNAL MEDICINE

## 2018-07-28 PROCEDURE — 84100 ASSAY OF PHOSPHORUS: CPT

## 2018-07-28 PROCEDURE — 84484 ASSAY OF TROPONIN QUANT: CPT

## 2018-07-28 PROCEDURE — 2700000000 HC OXYGEN THERAPY PER DAY

## 2018-07-28 PROCEDURE — 6360000002 HC RX W HCPCS: Performed by: INTERNAL MEDICINE

## 2018-07-28 PROCEDURE — 83605 ASSAY OF LACTIC ACID: CPT

## 2018-07-28 PROCEDURE — 83735 ASSAY OF MAGNESIUM: CPT

## 2018-07-28 PROCEDURE — 2060000000 HC ICU INTERMEDIATE R&B

## 2018-07-28 PROCEDURE — 85025 COMPLETE CBC W/AUTO DIFF WBC: CPT

## 2018-07-28 PROCEDURE — 94660 CPAP INITIATION&MGMT: CPT

## 2018-07-28 PROCEDURE — 99231 SBSQ HOSP IP/OBS SF/LOW 25: CPT | Performed by: INTERNAL MEDICINE

## 2018-07-28 PROCEDURE — 36415 COLL VENOUS BLD VENIPUNCTURE: CPT

## 2018-07-28 PROCEDURE — 82962 GLUCOSE BLOOD TEST: CPT

## 2018-07-28 RX ORDER — DULOXETIN HYDROCHLORIDE 60 MG/1
60 CAPSULE, DELAYED RELEASE ORAL 2 TIMES DAILY
Status: DISCONTINUED | OUTPATIENT
Start: 2018-07-28 | End: 2018-07-31 | Stop reason: HOSPADM

## 2018-07-28 RX ORDER — PETROLATUM 42 G/100G
OINTMENT TOPICAL 2 TIMES DAILY PRN
Status: DISCONTINUED | OUTPATIENT
Start: 2018-07-28 | End: 2018-07-31 | Stop reason: HOSPADM

## 2018-07-28 RX ORDER — MORPHINE SULFATE 2 MG/ML
INJECTION, SOLUTION INTRAMUSCULAR; INTRAVENOUS
Status: DISCONTINUED
Start: 2018-07-28 | End: 2018-07-28

## 2018-07-28 RX ORDER — BUMETANIDE 1 MG/1
1 TABLET ORAL 2 TIMES DAILY
Status: DISCONTINUED | OUTPATIENT
Start: 2018-07-28 | End: 2018-07-31 | Stop reason: HOSPADM

## 2018-07-28 RX ORDER — CALCIUM CARBONATE 200(500)MG
500 TABLET,CHEWABLE ORAL 3 TIMES DAILY PRN
Status: DISCONTINUED | OUTPATIENT
Start: 2018-07-28 | End: 2018-07-29

## 2018-07-28 RX ORDER — ANALGESIC BALM 1.74; 4.06 G/29G; G/29G
OINTMENT TOPICAL ONCE
Status: COMPLETED | OUTPATIENT
Start: 2018-07-28 | End: 2018-07-28

## 2018-07-28 RX ORDER — CHOLESTYRAMINE 4 G/9G
1 POWDER, FOR SUSPENSION ORAL 2 TIMES DAILY
Status: DISCONTINUED | OUTPATIENT
Start: 2018-07-28 | End: 2018-07-29

## 2018-07-28 RX ORDER — MIDAZOLAM HYDROCHLORIDE 1 MG/ML
2 INJECTION INTRAMUSCULAR; INTRAVENOUS ONCE
Status: COMPLETED | OUTPATIENT
Start: 2018-07-28 | End: 2018-07-28

## 2018-07-28 RX ORDER — MIDAZOLAM HYDROCHLORIDE 1 MG/ML
INJECTION INTRAMUSCULAR; INTRAVENOUS
Status: DISPENSED
Start: 2018-07-28 | End: 2018-07-28

## 2018-07-28 RX ADMIN — ANALGESIC BALM: 1.74; 4.06 OINTMENT TOPICAL at 10:28

## 2018-07-28 RX ADMIN — ACETAMINOPHEN 650 MG: 325 TABLET, FILM COATED ORAL at 03:50

## 2018-07-28 RX ADMIN — MONTELUKAST SODIUM 10 MG: 10 TABLET, FILM COATED ORAL at 21:49

## 2018-07-28 RX ADMIN — TRAMADOL HYDROCHLORIDE 50 MG: 50 TABLET, FILM COATED ORAL at 16:14

## 2018-07-28 RX ADMIN — CHOLESTYRAMINE 4 G: 4 POWDER, FOR SUSPENSION ORAL at 21:50

## 2018-07-28 RX ADMIN — INSULIN LISPRO 35 UNITS: 100 INJECTION, SOLUTION INTRAVENOUS; SUBCUTANEOUS at 11:53

## 2018-07-28 RX ADMIN — Medication 10 ML: at 10:18

## 2018-07-28 RX ADMIN — Medication 10 ML: at 21:53

## 2018-07-28 RX ADMIN — DULOXETINE HYDROCHLORIDE 60 MG: 60 CAPSULE, DELAYED RELEASE ORAL at 10:50

## 2018-07-28 RX ADMIN — METOPROLOL SUCCINATE 25 MG: 25 TABLET, FILM COATED, EXTENDED RELEASE ORAL at 21:48

## 2018-07-28 RX ADMIN — SACUBITRIL AND VALSARTAN 1 TABLET: 24; 26 TABLET, FILM COATED ORAL at 21:50

## 2018-07-28 RX ADMIN — HYDRALAZINE HYDROCHLORIDE 100 MG: 50 TABLET, FILM COATED ORAL at 21:50

## 2018-07-28 RX ADMIN — MOMETASONE FUROATE AND FORMOTEROL FUMARATE DIHYDRATE 2 PUFF: 200; 5 AEROSOL RESPIRATORY (INHALATION) at 10:17

## 2018-07-28 RX ADMIN — SACUBITRIL AND VALSARTAN 1 TABLET: 24; 26 TABLET, FILM COATED ORAL at 10:13

## 2018-07-28 RX ADMIN — HYDRALAZINE HYDROCHLORIDE 100 MG: 50 TABLET, FILM COATED ORAL at 10:13

## 2018-07-28 RX ADMIN — DULOXETINE HYDROCHLORIDE 60 MG: 60 CAPSULE, DELAYED RELEASE ORAL at 21:50

## 2018-07-28 RX ADMIN — ASPIRIN 81 MG CHEWABLE TABLET 81 MG: 81 TABLET CHEWABLE at 10:13

## 2018-07-28 RX ADMIN — DIPHENHYDRAMINE HYDROCHLORIDE AND ZINC ACETATE: 10; 1 CREAM TOPICAL at 21:49

## 2018-07-28 RX ADMIN — TRAMADOL HYDROCHLORIDE 50 MG: 50 TABLET, FILM COATED ORAL at 23:01

## 2018-07-28 RX ADMIN — ISOSORBIDE MONONITRATE 60 MG: 60 TABLET ORAL at 10:14

## 2018-07-28 RX ADMIN — METOPROLOL SUCCINATE 25 MG: 25 TABLET, FILM COATED, EXTENDED RELEASE ORAL at 10:13

## 2018-07-28 RX ADMIN — Medication 10 ML: at 06:57

## 2018-07-28 RX ADMIN — ROSUVASTATIN CALCIUM 10 MG: 10 TABLET, FILM COATED ORAL at 21:50

## 2018-07-28 RX ADMIN — MIDAZOLAM 2 MG: 1 INJECTION INTRAMUSCULAR; INTRAVENOUS at 06:56

## 2018-07-28 RX ADMIN — ENOXAPARIN SODIUM 40 MG: 100 INJECTION SUBCUTANEOUS at 10:15

## 2018-07-28 RX ADMIN — TRAMADOL HYDROCHLORIDE 50 MG: 50 TABLET, FILM COATED ORAL at 05:26

## 2018-07-28 RX ADMIN — BUMETANIDE 1 MG: 1 TABLET ORAL at 21:50

## 2018-07-28 RX ADMIN — BUMETANIDE 1 MG: 1 TABLET ORAL at 10:14

## 2018-07-28 RX ADMIN — CHOLESTYRAMINE 4 G: 4 POWDER, FOR SUSPENSION ORAL at 10:16

## 2018-07-28 ASSESSMENT — PAIN SCALES - GENERAL
PAINLEVEL_OUTOF10: 10
PAINLEVEL_OUTOF10: 2
PAINLEVEL_OUTOF10: 0
PAINLEVEL_OUTOF10: 0
PAINLEVEL_OUTOF10: 10
PAINLEVEL_OUTOF10: 0
PAINLEVEL_OUTOF10: 0
PAINLEVEL_OUTOF10: 10
PAINLEVEL_OUTOF10: 0
PAINLEVEL_OUTOF10: 10

## 2018-07-28 NOTE — PLAN OF CARE
Problem: Falls - Risk of:  Goal: Absence of physical injury  Absence of physical injury   Outcome: Met This Shift      Problem: Cardiac:  Goal: Ability to maintain vital signs within normal range will improve  Ability to maintain vital signs within normal range will improve   Outcome: Met This Shift

## 2018-07-28 NOTE — PROGRESS NOTES
Date: 7/27/2018    Time: 10:15 PM    Patient Placed On BIPAP/CPAP/ Non-Invasive Ventilation? Yes    If no must comment. Facial area red/color change? No           If YES are Blister/Lesion present? No   If yes must notify nursing staff  BIPAP/CPAP skin barrier?   Yes    Skin barrier type:Liquicel       Comments:        Joao Henderson

## 2018-07-28 NOTE — PROGRESS NOTES
[id number] Pt requested medication for abdominal pain radiating up into chest.  650mg Tylenol given. Pt began having intermittent episodes of violently shaking. Pt awake and talking during episodes. Resident contacted and up to see pt.

## 2018-07-28 NOTE — PROGRESS NOTES
4.33 07/28/2018    HGB 13.2 07/28/2018    HCT 42.6 07/28/2018    MCV 98.4 07/28/2018    MCH 30.5 07/28/2018    MCHC 31.0 07/28/2018    RDW 13.2 07/28/2018     07/28/2018    MPV 10.6 07/28/2018     BMP:    Lab Results   Component Value Date     07/28/2018    K 3.8 07/28/2018    CL 99 07/28/2018    CO2 28 07/28/2018    BUN 14 07/28/2018    LABALBU 3.8 07/26/2018    LABALBU 5.2 12/01/2011    CREATININE 1.0 07/28/2018    CALCIUM 8.7 07/28/2018    GFRAA >60 07/28/2018    LABGLOM >60 07/28/2018    GLUCOSE 204 07/28/2018    GLUCOSE 181 12/01/2011     U/A:    Lab Results   Component Value Date    NITRITE negative 03/03/2014    COLORU Yellow 07/07/2018    PROTEINU Negative 07/07/2018    PHUR 6.0 07/07/2018    LABCAST RARE  01/23/2014    WBCUA 0-1 07/07/2018    WBCUA 0-1 04/25/2011    RBCUA NONE 07/07/2018    RBCUA 0-1 01/25/2014    BACTERIA MANY 07/07/2018    CLARITYU Clear 07/07/2018    SPECGRAV 1.020 07/07/2018    LEUKOCYTESUR Negative 07/07/2018    UROBILINOGEN 0.2 07/07/2018    BILIRUBINUR Negative 07/07/2018    BILIRUBINUR negative 03/03/2014    BILIRUBINUR NEGATIVE 04/25/2011    BLOODU Negative 07/07/2018    GLUCOSEU Negative 07/07/2018    GLUCOSEU NEGATIVE 04/25/2011     TSH:    Lab Results   Component Value Date    TSH 1.560 11/07/2017     VITAMIN B12: No components found for: B12  FOLATE:    Lab Results   Component Value Date    FOLATE 10.3 11/02/2017       Resident's Assessment and Plan     Cherylene Gruber is a 60 y.o. female with a PMHx of HTN,Type II DM, HLD, CAD s/p CABG in 2011, AMI, PAD, HFrEF(25% initially, now 65% on GDMT), COPD, DAYAN, Nicotine abuse, Depression, and Obesity. 1. Atypical chest pain  - resolving  - likely musculoskeletal 2/2 fall  - trended troponins negative  - CXR of b/l ribs negative for fractures  - continue pain management with Ultram and Mehrdad Silvia balm    2.  Recurrent falls  - likely multifactorial (pt has a hx of DM neuropathy, PAD, OA of knee, and chronic diarrhea)  -

## 2018-07-29 LAB
ANION GAP SERPL CALCULATED.3IONS-SCNC: 12 MMOL/L (ref 7–16)
BASOPHILS ABSOLUTE: 0.01 E9/L (ref 0–0.2)
BASOPHILS RELATIVE PERCENT: 0.1 % (ref 0–2)
BUN BLDV-MCNC: 10 MG/DL (ref 8–23)
CALCIUM SERPL-MCNC: 8.8 MG/DL (ref 8.6–10.2)
CHLORIDE BLD-SCNC: 99 MMOL/L (ref 98–107)
CO2: 30 MMOL/L (ref 22–29)
CREAT SERPL-MCNC: 0.9 MG/DL (ref 0.5–1)
CULTURE, RESPIRATORY: NORMAL
EOSINOPHILS ABSOLUTE: 0.17 E9/L (ref 0.05–0.5)
EOSINOPHILS RELATIVE PERCENT: 2.4 % (ref 0–6)
GFR AFRICAN AMERICAN: >60
GFR NON-AFRICAN AMERICAN: >60 ML/MIN/1.73
GLUCOSE BLD-MCNC: 134 MG/DL (ref 74–109)
HCT VFR BLD CALC: 40.3 % (ref 34–48)
HEMOGLOBIN: 13.1 G/DL (ref 11.5–15.5)
IMMATURE GRANULOCYTES #: 0.02 E9/L
IMMATURE GRANULOCYTES %: 0.3 % (ref 0–5)
LYMPHOCYTES ABSOLUTE: 0.75 E9/L (ref 1.5–4)
LYMPHOCYTES RELATIVE PERCENT: 10.7 % (ref 20–42)
MAGNESIUM: 1.6 MG/DL (ref 1.6–2.6)
MCH RBC QN AUTO: 31.4 PG (ref 26–35)
MCHC RBC AUTO-ENTMCNC: 32.5 % (ref 32–34.5)
MCV RBC AUTO: 96.6 FL (ref 80–99.9)
METER GLUCOSE: 138 MG/DL (ref 70–110)
METER GLUCOSE: 145 MG/DL (ref 70–110)
METER GLUCOSE: 162 MG/DL (ref 70–110)
METER GLUCOSE: 166 MG/DL (ref 70–110)
MONOCYTES ABSOLUTE: 0.44 E9/L (ref 0.1–0.95)
MONOCYTES RELATIVE PERCENT: 6.3 % (ref 2–12)
NEUTROPHILS ABSOLUTE: 5.64 E9/L (ref 1.8–7.3)
NEUTROPHILS RELATIVE PERCENT: 80.2 % (ref 43–80)
PDW BLD-RTO: 13.2 FL (ref 11.5–15)
PHOSPHORUS: 2.5 MG/DL (ref 2.5–4.5)
PLATELET # BLD: 198 E9/L (ref 130–450)
PMV BLD AUTO: 10.9 FL (ref 7–12)
POTASSIUM REFLEX MAGNESIUM: 3.6 MMOL/L (ref 3.5–5)
RBC # BLD: 4.17 E12/L (ref 3.5–5.5)
SMEAR, RESPIRATORY: NORMAL
SODIUM BLD-SCNC: 141 MMOL/L (ref 132–146)
WBC # BLD: 7 E9/L (ref 4.5–11.5)

## 2018-07-29 PROCEDURE — 36415 COLL VENOUS BLD VENIPUNCTURE: CPT

## 2018-07-29 PROCEDURE — 6370000000 HC RX 637 (ALT 250 FOR IP): Performed by: INTERNAL MEDICINE

## 2018-07-29 PROCEDURE — 80048 BASIC METABOLIC PNL TOTAL CA: CPT

## 2018-07-29 PROCEDURE — 94664 DEMO&/EVAL PT USE INHALER: CPT

## 2018-07-29 PROCEDURE — 2580000003 HC RX 258: Performed by: INTERNAL MEDICINE

## 2018-07-29 PROCEDURE — 2060000000 HC ICU INTERMEDIATE R&B

## 2018-07-29 PROCEDURE — 2700000000 HC OXYGEN THERAPY PER DAY

## 2018-07-29 PROCEDURE — 94640 AIRWAY INHALATION TREATMENT: CPT

## 2018-07-29 PROCEDURE — 94660 CPAP INITIATION&MGMT: CPT

## 2018-07-29 PROCEDURE — 6360000002 HC RX W HCPCS: Performed by: INTERNAL MEDICINE

## 2018-07-29 PROCEDURE — 99231 SBSQ HOSP IP/OBS SF/LOW 25: CPT | Performed by: INTERNAL MEDICINE

## 2018-07-29 PROCEDURE — 99999 PR OFFICE/OUTPT VISIT,PROCEDURE ONLY: CPT | Performed by: INTERNAL MEDICINE

## 2018-07-29 PROCEDURE — 85025 COMPLETE CBC W/AUTO DIFF WBC: CPT

## 2018-07-29 PROCEDURE — 83735 ASSAY OF MAGNESIUM: CPT

## 2018-07-29 PROCEDURE — 82962 GLUCOSE BLOOD TEST: CPT

## 2018-07-29 PROCEDURE — 84100 ASSAY OF PHOSPHORUS: CPT

## 2018-07-29 RX ORDER — DIPHENHYDRAMINE HCL 25 MG
25 TABLET ORAL ONCE
Status: COMPLETED | OUTPATIENT
Start: 2018-07-29 | End: 2018-07-29

## 2018-07-29 RX ORDER — MAGNESIUM SULFATE IN WATER 40 MG/ML
2 INJECTION, SOLUTION INTRAVENOUS ONCE
Status: COMPLETED | OUTPATIENT
Start: 2018-07-29 | End: 2018-07-29

## 2018-07-29 RX ORDER — BUDESONIDE 0.5 MG/2ML
0.5 INHALANT ORAL 2 TIMES DAILY
Status: DISCONTINUED | OUTPATIENT
Start: 2018-07-29 | End: 2018-07-30

## 2018-07-29 RX ORDER — CHOLESTYRAMINE 4 G/9G
1 POWDER, FOR SUSPENSION ORAL 2 TIMES DAILY
Status: DISCONTINUED | OUTPATIENT
Start: 2018-07-29 | End: 2018-07-31 | Stop reason: HOSPADM

## 2018-07-29 RX ORDER — CALCIUM CARBONATE 200(500)MG
500 TABLET,CHEWABLE ORAL 3 TIMES DAILY PRN
Status: DISCONTINUED | OUTPATIENT
Start: 2018-07-29 | End: 2018-07-31 | Stop reason: HOSPADM

## 2018-07-29 RX ORDER — MIDAZOLAM HYDROCHLORIDE 1 MG/ML
1 INJECTION INTRAMUSCULAR; INTRAVENOUS ONCE
Status: DISCONTINUED | OUTPATIENT
Start: 2018-07-29 | End: 2018-07-29

## 2018-07-29 RX ORDER — CETIRIZINE HYDROCHLORIDE 10 MG/1
10 TABLET ORAL DAILY PRN
Status: DISCONTINUED | OUTPATIENT
Start: 2018-07-29 | End: 2018-07-31 | Stop reason: HOSPADM

## 2018-07-29 RX ORDER — FORMOTEROL FUMARATE 20 UG/2ML
20 SOLUTION RESPIRATORY (INHALATION) EVERY 12 HOURS
Status: DISCONTINUED | OUTPATIENT
Start: 2018-07-29 | End: 2018-07-31 | Stop reason: HOSPADM

## 2018-07-29 RX ADMIN — TRAMADOL HYDROCHLORIDE 50 MG: 50 TABLET, FILM COATED ORAL at 06:37

## 2018-07-29 RX ADMIN — BUDESONIDE 500 MCG: 0.5 SUSPENSION RESPIRATORY (INHALATION) at 09:55

## 2018-07-29 RX ADMIN — DULOXETINE HYDROCHLORIDE 60 MG: 60 CAPSULE, DELAYED RELEASE ORAL at 21:23

## 2018-07-29 RX ADMIN — BUMETANIDE 1 MG: 1 TABLET ORAL at 21:23

## 2018-07-29 RX ADMIN — SACUBITRIL AND VALSARTAN 1 TABLET: 24; 26 TABLET, FILM COATED ORAL at 21:23

## 2018-07-29 RX ADMIN — INSULIN LISPRO 20 UNITS: 100 INJECTION, SOLUTION INTRAVENOUS; SUBCUTANEOUS at 16:20

## 2018-07-29 RX ADMIN — DIPHENHYDRAMINE HCL 25 MG: 25 TABLET ORAL at 21:22

## 2018-07-29 RX ADMIN — CETIRIZINE HYDROCHLORIDE 10 MG: 10 TABLET, FILM COATED ORAL at 10:30

## 2018-07-29 RX ADMIN — METOPROLOL SUCCINATE 25 MG: 25 TABLET, FILM COATED, EXTENDED RELEASE ORAL at 21:23

## 2018-07-29 RX ADMIN — ENOXAPARIN SODIUM 40 MG: 100 INJECTION SUBCUTANEOUS at 09:39

## 2018-07-29 RX ADMIN — BUMETANIDE 1 MG: 1 TABLET ORAL at 08:45

## 2018-07-29 RX ADMIN — ROSUVASTATIN CALCIUM 10 MG: 10 TABLET, FILM COATED ORAL at 21:23

## 2018-07-29 RX ADMIN — DULOXETINE HYDROCHLORIDE 60 MG: 60 CAPSULE, DELAYED RELEASE ORAL at 08:45

## 2018-07-29 RX ADMIN — HYDRALAZINE HYDROCHLORIDE 100 MG: 50 TABLET, FILM COATED ORAL at 14:30

## 2018-07-29 RX ADMIN — TRAMADOL HYDROCHLORIDE 50 MG: 50 TABLET, FILM COATED ORAL at 18:20

## 2018-07-29 RX ADMIN — METOPROLOL SUCCINATE 25 MG: 25 TABLET, FILM COATED, EXTENDED RELEASE ORAL at 08:45

## 2018-07-29 RX ADMIN — PETROLATUM: 42 OINTMENT TOPICAL at 17:15

## 2018-07-29 RX ADMIN — FORMOTEROL FUMARATE DIHYDRATE 20 MCG: 20 SOLUTION RESPIRATORY (INHALATION) at 18:56

## 2018-07-29 RX ADMIN — SACUBITRIL AND VALSARTAN 1 TABLET: 24; 26 TABLET, FILM COATED ORAL at 08:45

## 2018-07-29 RX ADMIN — BUDESONIDE 500 MCG: 0.5 SUSPENSION RESPIRATORY (INHALATION) at 18:55

## 2018-07-29 RX ADMIN — Medication 10 ML: at 21:23

## 2018-07-29 RX ADMIN — HYDRALAZINE HYDROCHLORIDE 100 MG: 50 TABLET, FILM COATED ORAL at 08:45

## 2018-07-29 RX ADMIN — CHOLESTYRAMINE 4 G: 4 POWDER, FOR SUSPENSION ORAL at 21:24

## 2018-07-29 RX ADMIN — HYDRALAZINE HYDROCHLORIDE 100 MG: 50 TABLET, FILM COATED ORAL at 21:23

## 2018-07-29 RX ADMIN — ISOSORBIDE MONONITRATE 60 MG: 60 TABLET ORAL at 08:45

## 2018-07-29 RX ADMIN — INSULIN LISPRO 2 UNITS: 100 INJECTION, SOLUTION INTRAVENOUS; SUBCUTANEOUS at 16:21

## 2018-07-29 RX ADMIN — ASPIRIN 81 MG CHEWABLE TABLET 81 MG: 81 TABLET CHEWABLE at 08:45

## 2018-07-29 RX ADMIN — CHOLESTYRAMINE 4 G: 4 POWDER, FOR SUSPENSION ORAL at 08:45

## 2018-07-29 RX ADMIN — Medication 10 ML: at 08:43

## 2018-07-29 RX ADMIN — MONTELUKAST SODIUM 10 MG: 10 TABLET, FILM COATED ORAL at 21:23

## 2018-07-29 RX ADMIN — FORMOTEROL FUMARATE DIHYDRATE 20 MCG: 20 SOLUTION RESPIRATORY (INHALATION) at 10:02

## 2018-07-29 RX ADMIN — MAGNESIUM SULFATE HEPTAHYDRATE 2 G: 40 INJECTION, SOLUTION INTRAVENOUS at 10:30

## 2018-07-29 ASSESSMENT — PAIN SCALES - GENERAL
PAINLEVEL_OUTOF10: 10
PAINLEVEL_OUTOF10: 0
PAINLEVEL_OUTOF10: 10
PAINLEVEL_OUTOF10: 10
PAINLEVEL_OUTOF10: 0

## 2018-07-29 ASSESSMENT — PAIN DESCRIPTION - LOCATION: LOCATION: ABDOMEN;OTHER (COMMENT)

## 2018-07-29 ASSESSMENT — PAIN DESCRIPTION - ONSET: ONSET: ON-GOING

## 2018-07-29 ASSESSMENT — PAIN DESCRIPTION - FREQUENCY: FREQUENCY: INTERMITTENT

## 2018-07-29 ASSESSMENT — PAIN DESCRIPTION - DESCRIPTORS: DESCRIPTORS: ACHING;DISCOMFORT;CONSTANT

## 2018-07-29 ASSESSMENT — PAIN DESCRIPTION - PAIN TYPE: TYPE: ACUTE PAIN

## 2018-07-29 NOTE — PROGRESS NOTES
13.2 07/29/2018     07/29/2018    MPV 10.9 07/29/2018     CMP:    Lab Results   Component Value Date     07/29/2018    K 3.6 07/29/2018    CL 99 07/29/2018    CO2 30 07/29/2018    BUN 10 07/29/2018    CREATININE 0.9 07/29/2018    GFRAA >60 07/29/2018    LABGLOM >60 07/29/2018    GLUCOSE 134 07/29/2018    GLUCOSE 181 12/01/2011    PROT 7.2 07/26/2018    LABALBU 3.8 07/26/2018    LABALBU 5.2 12/01/2011    CALCIUM 8.8 07/29/2018    BILITOT 0.3 07/26/2018    ALKPHOS 87 07/26/2018    AST 20 07/26/2018    ALT 25 07/26/2018     Hepatic Function Panel:    Lab Results   Component Value Date    ALKPHOS 87 07/26/2018    ALT 25 07/26/2018    AST 20 07/26/2018    PROT 7.2 07/26/2018    BILITOT 0.3 07/26/2018    BILIDIR <0.2 04/24/2017    IBILI 0.1 04/24/2017    LABALBU 3.8 07/26/2018    LABALBU 5.2 12/01/2011     Magnesium:    Lab Results   Component Value Date    MG 1.6 07/29/2018     Phosphorus:    Lab Results   Component Value Date    PHOS 2.5 07/29/2018     HgBA1c:    Lab Results   Component Value Date    LABA1C 6.1 07/27/2018     TSH:    Lab Results   Component Value Date    TSH 1.560 11/07/2017       Resident's Assessment and Plan     Елена Course is a 60 y.o. female with a PMHx of HTN,Type II DM, HLD, CAD s/p CABG in 2011, AMI, PAD, HFrEF(25% initially, now 65% on GDMT), COPD, DAYAN, Nicotine abuse, Depression, and Obesity.     1. Atypical chest pain  - resolving  - likely musculoskeletal 2/2 fall  - trended troponins negative  - CXR of b/l ribs negative for fractures  - continue pain management with Ultram and Mehrdad Vega balm     2. Recurrent falls  - likely multifactorial (pt has a hx of DM neuropathy, PAD, OA of knee, and chronic diarrhea)  - orthostatic vitals negative  - PT/OT following  - pt likely to benefit from sub acute rehab therapy.     3.  Chronic diarrhea  - likely due to IBS  - pt is managed as an outpatient by Dr. Dee Dee Neely and has been previously worked up  - [employer] and Calcium carbonate     4. HFrEF with exacerbation (EF from stress test in 06/18-42%)  - resolving  - no SOB, pro-BNP trending downwards. - continue Imdur, Toprol, Entresto, Bumex     5. DM type II  - better controlled- 138 this am  - continue on current Insulin regimen  - POCT glucose checks  - hypoglycemia protocol     6. DAYAN  - had Bi-pap overnight  - currently on 4 L O2 and SpO2 at 91%     7. HTN  - stable  - continue on current antihypertensive agents     8. CAD  - s/p CABG in 2012  - continue on ASA and Crestor     9. Hx of Depression  - resume Cymbalta 60 mg bid    10. Generalized itching  - likely 2/2 allergic reaction  - pt has a hx of asthma and atopic dermatitis  - to commence topical Benadryl tid PRN  - to commence Zyrtec 10 mg daily PRN    11.  Tremors  - likely 2/2 side effects of B2 agonists  - tremors usually occur after the use of inhalers  - pt conscious and talking during episodes of tremors  - pt reassured                 PT/OT evaluation: ordered  DVT prophylaxis/ GI prophylaxis: lovenox/diet  Disposition: telemetry     Suzi Barron MD, PGY-1     Attending physician: Dr. Nery Kwan

## 2018-07-29 NOTE — PROGRESS NOTES
Notified Dr. Camilla Camejo of patient having tremors/shakes affecting lower and upper body lasting approximately 2 mins remained alert and oriented and without complaint of dizziness.

## 2018-07-29 NOTE — PLAN OF CARE
Perfect-served to see pt who is complaining of itching. Pt seen. She had expected to receive an IV or tablet form of Benadryl. Pt already received Zyrtec po and Benadryl cream. She has been informed of the concerns with using Benadryl in the elderly. She has been reassured.

## 2018-07-30 ENCOUNTER — APPOINTMENT (OUTPATIENT)
Dept: GENERAL RADIOLOGY | Age: 63
DRG: 291 | End: 2018-07-30
Payer: MEDICARE

## 2018-07-30 ENCOUNTER — TELEPHONE (OUTPATIENT)
Dept: FAMILY MEDICINE CLINIC | Age: 63
End: 2018-07-30

## 2018-07-30 PROBLEM — E11.8 TYPE 2 DIABETES MELLITUS WITH COMPLICATION (HCC): Status: RESOLVED | Noted: 2017-04-19 | Resolved: 2018-07-30

## 2018-07-30 PROBLEM — R07.9 CHEST PAIN: Status: RESOLVED | Noted: 2018-07-26 | Resolved: 2018-07-30

## 2018-07-30 PROBLEM — I21.4 NSTEMI (NON-ST ELEVATED MYOCARDIAL INFARCTION) (HCC): Status: RESOLVED | Noted: 2018-06-04 | Resolved: 2018-07-30

## 2018-07-30 PROBLEM — R26.2 IMPAIRED AMBULATION: Status: RESOLVED | Noted: 2018-07-28 | Resolved: 2018-07-30

## 2018-07-30 PROBLEM — I87.8 VENOUS STASIS OF BOTH LOWER EXTREMITIES: Status: RESOLVED | Noted: 2017-10-11 | Resolved: 2018-07-30

## 2018-07-30 LAB
ANION GAP SERPL CALCULATED.3IONS-SCNC: 12 MMOL/L (ref 7–16)
BASOPHILS ABSOLUTE: 0.02 E9/L (ref 0–0.2)
BASOPHILS RELATIVE PERCENT: 0.3 % (ref 0–2)
BUN BLDV-MCNC: 9 MG/DL (ref 8–23)
CALCIUM SERPL-MCNC: 9.2 MG/DL (ref 8.6–10.2)
CHLORIDE BLD-SCNC: 98 MMOL/L (ref 98–107)
CO2: 32 MMOL/L (ref 22–29)
CREAT SERPL-MCNC: 0.9 MG/DL (ref 0.5–1)
EKG ATRIAL RATE: 69 BPM
EKG ATRIAL RATE: 81 BPM
EKG P AXIS: 45 DEGREES
EKG P AXIS: 48 DEGREES
EKG P-R INTERVAL: 176 MS
EKG P-R INTERVAL: 178 MS
EKG Q-T INTERVAL: 484 MS
EKG Q-T INTERVAL: 506 MS
EKG QRS DURATION: 142 MS
EKG QRS DURATION: 146 MS
EKG QTC CALCULATION (BAZETT): 542 MS
EKG QTC CALCULATION (BAZETT): 562 MS
EKG R AXIS: 61 DEGREES
EKG R AXIS: 72 DEGREES
EKG T AXIS: 51 DEGREES
EKG T AXIS: 63 DEGREES
EKG VENTRICULAR RATE: 69 BPM
EKG VENTRICULAR RATE: 81 BPM
EOSINOPHILS ABSOLUTE: 0.16 E9/L (ref 0.05–0.5)
EOSINOPHILS RELATIVE PERCENT: 2.2 % (ref 0–6)
GFR AFRICAN AMERICAN: >60
GFR NON-AFRICAN AMERICAN: >60 ML/MIN/1.73
GLUCOSE BLD-MCNC: 117 MG/DL (ref 74–109)
HCT VFR BLD CALC: 43.5 % (ref 34–48)
HEMOGLOBIN: 14.2 G/DL (ref 11.5–15.5)
IMMATURE GRANULOCYTES #: 0.02 E9/L
IMMATURE GRANULOCYTES %: 0.3 % (ref 0–5)
LYMPHOCYTES ABSOLUTE: 0.87 E9/L (ref 1.5–4)
LYMPHOCYTES RELATIVE PERCENT: 11.8 % (ref 20–42)
MAGNESIUM: 1.9 MG/DL (ref 1.6–2.6)
MCH RBC QN AUTO: 31.3 PG (ref 26–35)
MCHC RBC AUTO-ENTMCNC: 32.6 % (ref 32–34.5)
MCV RBC AUTO: 96 FL (ref 80–99.9)
METER GLUCOSE: 101 MG/DL (ref 70–110)
METER GLUCOSE: 112 MG/DL (ref 70–110)
METER GLUCOSE: 138 MG/DL (ref 70–110)
METER GLUCOSE: 150 MG/DL (ref 70–110)
MONOCYTES ABSOLUTE: 0.54 E9/L (ref 0.1–0.95)
MONOCYTES RELATIVE PERCENT: 7.3 % (ref 2–12)
NEUTROPHILS ABSOLUTE: 5.76 E9/L (ref 1.8–7.3)
NEUTROPHILS RELATIVE PERCENT: 78.1 % (ref 43–80)
PDW BLD-RTO: 13.2 FL (ref 11.5–15)
PLATELET # BLD: 221 E9/L (ref 130–450)
PMV BLD AUTO: 11 FL (ref 7–12)
POTASSIUM SERPL-SCNC: 3.6 MMOL/L (ref 3.5–5)
PRO-BNP: 755 PG/ML (ref 0–125)
RBC # BLD: 4.53 E12/L (ref 3.5–5.5)
SODIUM BLD-SCNC: 142 MMOL/L (ref 132–146)
WBC # BLD: 7.4 E9/L (ref 4.5–11.5)

## 2018-07-30 PROCEDURE — 82962 GLUCOSE BLOOD TEST: CPT

## 2018-07-30 PROCEDURE — 94060 EVALUATION OF WHEEZING: CPT

## 2018-07-30 PROCEDURE — 94660 CPAP INITIATION&MGMT: CPT

## 2018-07-30 PROCEDURE — 2060000000 HC ICU INTERMEDIATE R&B

## 2018-07-30 PROCEDURE — 2580000003 HC RX 258: Performed by: INTERNAL MEDICINE

## 2018-07-30 PROCEDURE — 80048 BASIC METABOLIC PNL TOTAL CA: CPT

## 2018-07-30 PROCEDURE — 83735 ASSAY OF MAGNESIUM: CPT

## 2018-07-30 PROCEDURE — 71045 X-RAY EXAM CHEST 1 VIEW: CPT

## 2018-07-30 PROCEDURE — 99231 SBSQ HOSP IP/OBS SF/LOW 25: CPT | Performed by: INTERNAL MEDICINE

## 2018-07-30 PROCEDURE — 36415 COLL VENOUS BLD VENIPUNCTURE: CPT

## 2018-07-30 PROCEDURE — 97535 SELF CARE MNGMENT TRAINING: CPT

## 2018-07-30 PROCEDURE — 6370000000 HC RX 637 (ALT 250 FOR IP): Performed by: INTERNAL MEDICINE

## 2018-07-30 PROCEDURE — 85025 COMPLETE CBC W/AUTO DIFF WBC: CPT

## 2018-07-30 PROCEDURE — 83880 ASSAY OF NATRIURETIC PEPTIDE: CPT

## 2018-07-30 PROCEDURE — 2700000000 HC OXYGEN THERAPY PER DAY

## 2018-07-30 PROCEDURE — 94640 AIRWAY INHALATION TREATMENT: CPT

## 2018-07-30 RX ORDER — LIDOCAINE 50 MG/G
2 PATCH TOPICAL DAILY
Status: DISCONTINUED | OUTPATIENT
Start: 2018-07-30 | End: 2018-07-31 | Stop reason: HOSPADM

## 2018-07-30 RX ORDER — OXYBUTYNIN CHLORIDE 5 MG/1
5 TABLET, EXTENDED RELEASE ORAL DAILY
Status: DISCONTINUED | OUTPATIENT
Start: 2018-07-30 | End: 2018-07-31 | Stop reason: HOSPADM

## 2018-07-30 RX ORDER — IPRATROPIUM BROMIDE AND ALBUTEROL SULFATE 2.5; .5 MG/3ML; MG/3ML
1 SOLUTION RESPIRATORY (INHALATION)
Status: DISCONTINUED | OUTPATIENT
Start: 2018-07-30 | End: 2018-07-31 | Stop reason: HOSPADM

## 2018-07-30 RX ADMIN — IPRATROPIUM BROMIDE AND ALBUTEROL SULFATE 1 AMPULE: 2.5; .5 SOLUTION RESPIRATORY (INHALATION) at 13:20

## 2018-07-30 RX ADMIN — CHOLESTYRAMINE 4 G: 4 POWDER, FOR SUSPENSION ORAL at 21:04

## 2018-07-30 RX ADMIN — ACETAMINOPHEN 650 MG: 325 TABLET, FILM COATED ORAL at 17:39

## 2018-07-30 RX ADMIN — HYDRALAZINE HYDROCHLORIDE 100 MG: 50 TABLET, FILM COATED ORAL at 14:44

## 2018-07-30 RX ADMIN — SACUBITRIL AND VALSARTAN 1 TABLET: 24; 26 TABLET, FILM COATED ORAL at 21:03

## 2018-07-30 RX ADMIN — ISOSORBIDE MONONITRATE 60 MG: 60 TABLET ORAL at 09:00

## 2018-07-30 RX ADMIN — HYDRALAZINE HYDROCHLORIDE 100 MG: 50 TABLET, FILM COATED ORAL at 08:59

## 2018-07-30 RX ADMIN — BUMETANIDE 1 MG: 1 TABLET ORAL at 21:03

## 2018-07-30 RX ADMIN — INSULIN LISPRO 20 UNITS: 100 INJECTION, SOLUTION INTRAVENOUS; SUBCUTANEOUS at 06:31

## 2018-07-30 RX ADMIN — INSULIN LISPRO 20 UNITS: 100 INJECTION, SOLUTION INTRAVENOUS; SUBCUTANEOUS at 16:48

## 2018-07-30 RX ADMIN — OXYBUTYNIN CHLORIDE 5 MG: 5 TABLET, FILM COATED, EXTENDED RELEASE ORAL at 17:39

## 2018-07-30 RX ADMIN — ACETAMINOPHEN 650 MG: 325 TABLET, FILM COATED ORAL at 06:48

## 2018-07-30 RX ADMIN — METOPROLOL SUCCINATE 25 MG: 25 TABLET, FILM COATED, EXTENDED RELEASE ORAL at 09:00

## 2018-07-30 RX ADMIN — DULOXETINE HYDROCHLORIDE 60 MG: 60 CAPSULE, DELAYED RELEASE ORAL at 09:00

## 2018-07-30 RX ADMIN — BUMETANIDE 1 MG: 1 TABLET ORAL at 09:00

## 2018-07-30 RX ADMIN — INSULIN LISPRO 20 UNITS: 100 INJECTION, SOLUTION INTRAVENOUS; SUBCUTANEOUS at 12:29

## 2018-07-30 RX ADMIN — ASPIRIN 81 MG CHEWABLE TABLET 81 MG: 81 TABLET CHEWABLE at 08:59

## 2018-07-30 RX ADMIN — ROSUVASTATIN CALCIUM 10 MG: 10 TABLET, FILM COATED ORAL at 21:03

## 2018-07-30 RX ADMIN — CETIRIZINE HYDROCHLORIDE 10 MG: 10 TABLET, FILM COATED ORAL at 08:59

## 2018-07-30 RX ADMIN — HYDRALAZINE HYDROCHLORIDE 100 MG: 50 TABLET, FILM COATED ORAL at 21:03

## 2018-07-30 RX ADMIN — SACUBITRIL AND VALSARTAN 1 TABLET: 24; 26 TABLET, FILM COATED ORAL at 08:59

## 2018-07-30 RX ADMIN — FORMOTEROL FUMARATE DIHYDRATE 20 MCG: 20 SOLUTION RESPIRATORY (INHALATION) at 23:18

## 2018-07-30 RX ADMIN — Medication 10 ML: at 21:04

## 2018-07-30 RX ADMIN — Medication 10 ML: at 08:59

## 2018-07-30 RX ADMIN — METOPROLOL SUCCINATE 25 MG: 25 TABLET, FILM COATED, EXTENDED RELEASE ORAL at 21:03

## 2018-07-30 RX ADMIN — CHOLESTYRAMINE 4 G: 4 POWDER, FOR SUSPENSION ORAL at 09:00

## 2018-07-30 RX ADMIN — INSULIN LISPRO 2 UNITS: 100 INJECTION, SOLUTION INTRAVENOUS; SUBCUTANEOUS at 06:33

## 2018-07-30 RX ADMIN — DULOXETINE HYDROCHLORIDE 60 MG: 60 CAPSULE, DELAYED RELEASE ORAL at 21:03

## 2018-07-30 RX ADMIN — MONTELUKAST SODIUM 10 MG: 10 TABLET, FILM COATED ORAL at 21:03

## 2018-07-30 ASSESSMENT — PAIN SCALES - GENERAL
PAINLEVEL_OUTOF10: 0
PAINLEVEL_OUTOF10: 0
PAINLEVEL_OUTOF10: 6
PAINLEVEL_OUTOF10: 0
PAINLEVEL_OUTOF10: 10

## 2018-07-30 NOTE — PROGRESS NOTES
Pulse ox sitting on RA    88%  Pulse ox ambulating on RA  85%  Pulse ox recovery ambulating on 3L  92%  Pulse ox recovery sitting on 3L  93%

## 2018-07-30 NOTE — PROGRESS NOTES
Musculoskeletal: Normal range of motion. She exhibits no edema, tenderness or deformity. Neurological: She is alert and oriented to person, place, and time. No cranial nerve deficit. Psychiatric: She has a normal mood and affect. Her behavior is normal. Judgment and thought content normal.     Pertinent Labs & Imaging Studies   paula  CBC:   Lab Results   Component Value Date    WBC 7.4 07/30/2018    RBC 4.53 07/30/2018    HGB 14.2 07/30/2018    HCT 43.5 07/30/2018    MCV 96.0 07/30/2018    MCH 31.3 07/30/2018    MCHC 32.6 07/30/2018    RDW 13.2 07/30/2018     07/30/2018    MPV 11.0 07/30/2018     BMP:    Lab Results   Component Value Date     07/30/2018    K 3.6 07/30/2018    K 3.6 07/29/2018    CL 98 07/30/2018    CO2 32 07/30/2018    BUN 9 07/30/2018    LABALBU 3.8 07/26/2018    LABALBU 5.2 12/01/2011    CREATININE 0.9 07/30/2018    CALCIUM 9.2 07/30/2018    GFRAA >60 07/30/2018    LABGLOM >60 07/30/2018    GLUCOSE 117 07/30/2018    GLUCOSE 181 12/01/2011       Resident's Assessment and Plan     Waqas Garza is a 60 y.o. female with a PMHx of HTN,Type II DM, HLD, CAD s/p CABG in 2011, AMI, PAD, HFrEF, COPD, DAYAN, Nicotine abuse, Depression, and Obesity. 1. Atypical chest pain  - resolving  - likely musculoskeletal 2/2 fall  - troponin neg X 3  - CXR of b/l ribs revealed no fractures  - continue pain management with Ultram and Mehrdad Vega balm     2.  HFrEF with exacerbation   - resolving, she Is still on 4L oxygen via NC.  - No SOB, orthopnea is same as baseline  - Pro-BNP 7/26/18 1,268 ---> 7/27/18 1,049   - continue Imdur, Toprol, Entresto, Bumex  - She is ambulating on her own but on oxygen via NC at 4L.  She was taken off oxygen and ambulated this afternoon:  Pulse ox sitting on RA    88%    Pulse ox ambulating on RA  85%    Pulse ox recovery ambulating on 3L  92%    Pulse ox recovery sitting on 3L  93%    She is not on home oxygen but she sleep with BiPAP at home.   - She was initially

## 2018-07-31 VITALS
WEIGHT: 251.1 LBS | BODY MASS INDEX: 46.21 KG/M2 | SYSTOLIC BLOOD PRESSURE: 124 MMHG | RESPIRATION RATE: 20 BRPM | DIASTOLIC BLOOD PRESSURE: 55 MMHG | HEIGHT: 62 IN | HEART RATE: 79 BPM | TEMPERATURE: 98.5 F | OXYGEN SATURATION: 88 %

## 2018-07-31 LAB
ANION GAP SERPL CALCULATED.3IONS-SCNC: 13 MMOL/L (ref 7–16)
BASOPHILS ABSOLUTE: 0.02 E9/L (ref 0–0.2)
BASOPHILS RELATIVE PERCENT: 0.3 % (ref 0–2)
BUN BLDV-MCNC: 14 MG/DL (ref 8–23)
CALCIUM SERPL-MCNC: 9.2 MG/DL (ref 8.6–10.2)
CHLORIDE BLD-SCNC: 97 MMOL/L (ref 98–107)
CO2: 27 MMOL/L (ref 22–29)
CREAT SERPL-MCNC: 0.9 MG/DL (ref 0.5–1)
EOSINOPHILS ABSOLUTE: 0.14 E9/L (ref 0.05–0.5)
EOSINOPHILS RELATIVE PERCENT: 2.1 % (ref 0–6)
GFR AFRICAN AMERICAN: >60
GFR NON-AFRICAN AMERICAN: >60 ML/MIN/1.73
GLUCOSE BLD-MCNC: 131 MG/DL (ref 74–109)
HCT VFR BLD CALC: 42.6 % (ref 34–48)
HEMOGLOBIN: 13.7 G/DL (ref 11.5–15.5)
IMMATURE GRANULOCYTES #: 0.01 E9/L
IMMATURE GRANULOCYTES %: 0.1 % (ref 0–5)
LYMPHOCYTES ABSOLUTE: 0.86 E9/L (ref 1.5–4)
LYMPHOCYTES RELATIVE PERCENT: 12.7 % (ref 20–42)
MAGNESIUM: 1.9 MG/DL (ref 1.6–2.6)
MCH RBC QN AUTO: 30.9 PG (ref 26–35)
MCHC RBC AUTO-ENTMCNC: 32.2 % (ref 32–34.5)
MCV RBC AUTO: 96.2 FL (ref 80–99.9)
METER GLUCOSE: 140 MG/DL (ref 70–110)
METER GLUCOSE: 143 MG/DL (ref 70–110)
MONOCYTES ABSOLUTE: 0.44 E9/L (ref 0.1–0.95)
MONOCYTES RELATIVE PERCENT: 6.5 % (ref 2–12)
NEUTROPHILS ABSOLUTE: 5.32 E9/L (ref 1.8–7.3)
NEUTROPHILS RELATIVE PERCENT: 78.3 % (ref 43–80)
PDW BLD-RTO: 13.2 FL (ref 11.5–15)
PLATELET # BLD: 216 E9/L (ref 130–450)
PMV BLD AUTO: 10.8 FL (ref 7–12)
POTASSIUM SERPL-SCNC: 3.7 MMOL/L (ref 3.5–5)
RBC # BLD: 4.43 E12/L (ref 3.5–5.5)
SODIUM BLD-SCNC: 137 MMOL/L (ref 132–146)
WBC # BLD: 6.8 E9/L (ref 4.5–11.5)

## 2018-07-31 PROCEDURE — 99238 HOSP IP/OBS DSCHRG MGMT 30/<: CPT | Performed by: INTERNAL MEDICINE

## 2018-07-31 PROCEDURE — 94660 CPAP INITIATION&MGMT: CPT

## 2018-07-31 PROCEDURE — 83735 ASSAY OF MAGNESIUM: CPT

## 2018-07-31 PROCEDURE — 6370000000 HC RX 637 (ALT 250 FOR IP): Performed by: INTERNAL MEDICINE

## 2018-07-31 PROCEDURE — 94640 AIRWAY INHALATION TREATMENT: CPT

## 2018-07-31 PROCEDURE — 36415 COLL VENOUS BLD VENIPUNCTURE: CPT

## 2018-07-31 PROCEDURE — 85025 COMPLETE CBC W/AUTO DIFF WBC: CPT

## 2018-07-31 PROCEDURE — 82962 GLUCOSE BLOOD TEST: CPT

## 2018-07-31 PROCEDURE — 2700000000 HC OXYGEN THERAPY PER DAY

## 2018-07-31 PROCEDURE — 80048 BASIC METABOLIC PNL TOTAL CA: CPT

## 2018-07-31 PROCEDURE — 2580000003 HC RX 258: Performed by: INTERNAL MEDICINE

## 2018-07-31 RX ADMIN — INSULIN LISPRO 20 UNITS: 100 INJECTION, SOLUTION INTRAVENOUS; SUBCUTANEOUS at 11:53

## 2018-07-31 RX ADMIN — METOPROLOL SUCCINATE 25 MG: 25 TABLET, FILM COATED, EXTENDED RELEASE ORAL at 09:21

## 2018-07-31 RX ADMIN — ACETAMINOPHEN 650 MG: 325 TABLET, FILM COATED ORAL at 03:25

## 2018-07-31 RX ADMIN — INSULIN LISPRO 20 UNITS: 100 INJECTION, SOLUTION INTRAVENOUS; SUBCUTANEOUS at 06:09

## 2018-07-31 RX ADMIN — OXYBUTYNIN CHLORIDE 5 MG: 5 TABLET, FILM COATED, EXTENDED RELEASE ORAL at 09:21

## 2018-07-31 RX ADMIN — ACETAMINOPHEN 650 MG: 325 TABLET, FILM COATED ORAL at 11:56

## 2018-07-31 RX ADMIN — INSULIN LISPRO 1 UNITS: 100 INJECTION, SOLUTION INTRAVENOUS; SUBCUTANEOUS at 11:54

## 2018-07-31 RX ADMIN — FORMOTEROL FUMARATE DIHYDRATE 20 MCG: 20 SOLUTION RESPIRATORY (INHALATION) at 08:58

## 2018-07-31 RX ADMIN — ASPIRIN 81 MG CHEWABLE TABLET 81 MG: 81 TABLET CHEWABLE at 09:21

## 2018-07-31 RX ADMIN — BUMETANIDE 1 MG: 1 TABLET ORAL at 09:21

## 2018-07-31 RX ADMIN — HYDRALAZINE HYDROCHLORIDE 100 MG: 50 TABLET, FILM COATED ORAL at 14:07

## 2018-07-31 RX ADMIN — SACUBITRIL AND VALSARTAN 1 TABLET: 24; 26 TABLET, FILM COATED ORAL at 09:21

## 2018-07-31 RX ADMIN — Medication 10 ML: at 09:21

## 2018-07-31 RX ADMIN — ISOSORBIDE MONONITRATE 60 MG: 60 TABLET ORAL at 09:21

## 2018-07-31 RX ADMIN — DULOXETINE HYDROCHLORIDE 60 MG: 60 CAPSULE, DELAYED RELEASE ORAL at 09:21

## 2018-07-31 RX ADMIN — CHOLESTYRAMINE 4 G: 4 POWDER, FOR SUSPENSION ORAL at 09:21

## 2018-07-31 RX ADMIN — HYDRALAZINE HYDROCHLORIDE 100 MG: 50 TABLET, FILM COATED ORAL at 09:24

## 2018-07-31 ASSESSMENT — PAIN SCALES - GENERAL
PAINLEVEL_OUTOF10: 0
PAINLEVEL_OUTOF10: 10
PAINLEVEL_OUTOF10: 6
PAINLEVEL_OUTOF10: 3
PAINLEVEL_OUTOF10: 0

## 2018-07-31 ASSESSMENT — PAIN DESCRIPTION - LOCATION: LOCATION: KNEE

## 2018-07-31 ASSESSMENT — PAIN DESCRIPTION - ORIENTATION: ORIENTATION: RIGHT;LEFT

## 2018-07-31 ASSESSMENT — PAIN DESCRIPTION - FREQUENCY: FREQUENCY: INTERMITTENT

## 2018-07-31 ASSESSMENT — PAIN DESCRIPTION - PAIN TYPE: TYPE: ACUTE PAIN

## 2018-07-31 ASSESSMENT — PAIN DESCRIPTION - DESCRIPTORS: DESCRIPTORS: ACHING;DISCOMFORT

## 2018-07-31 NOTE — CARE COORDINATION
7/31/2018 social work:discharge planning   Order for home 02 noted. Discussed with patient and referral made to [facility].  Plan remains home with [facility]

## 2018-07-31 NOTE — CARE COORDINATION
7/31/2018 social work:discharge planning   Venus at [facility] of discharge as well as oli at [facility]

## 2018-07-31 NOTE — PROGRESS NOTES
Date: 7/30/2018    Time: 11:29 PM    Patient Placed On BIPAP/CPAP/ Non-Invasive Ventilation? Yes    If no must comment. Facial area red/color change? No           If YES are Blister/Lesion present? No   If yes must notify nursing staff  BIPAP/CPAP skin barrier?   Yes    Skin barrier type:Liquicel       Comments:        Jason Arguelles

## 2018-07-31 NOTE — PLAN OF CARE
Problem: Pain:  Goal: Pain level will decrease  Pain level will decrease   Outcome: Ongoing    Goal: Control of chronic pain  Control of chronic pain   Outcome: Met This Shift      Problem: Falls - Risk of:  Goal: Will remain free from falls  Will remain free from falls   Outcome: Met This Shift    Goal: Absence of physical injury  Absence of physical injury   Outcome: Met This Shift

## 2018-07-31 NOTE — DISCHARGE SUMMARY
scan was negative for any intracranial process. She was given one dose of benadryl po and a tube of topical benadryl for the itching. She was on oxygen via NC during admission and attempt to wean her off failed. On room air, her saturation varied between 85 - 96%. She claims she does not use oxygen at home but uses her BiPAP at night. She is awaiting PFT studies.  She was discharged home with home health care as she refused DANIS, on home oxygen for use as need and further work up as outpatient, and follow-up with PCP in [facility].    Significant findings (history and exam, laboratory, radiological, pathology, other tests):  CBC:   Lab Results   Component Value Date    WBC 6.8 07/31/2018    RBC 4.43 07/31/2018    HGB 13.7 07/31/2018    HCT 42.6 07/31/2018    MCV 96.2 07/31/2018    MCH 30.9 07/31/2018    MCHC 32.2 07/31/2018    RDW 13.2 07/31/2018     07/31/2018    MPV 10.8 07/31/2018     WBC:    Lab Results   Component Value Date    WBC 6.8 07/31/2018     BMP:    Lab Results   Component Value Date     07/31/2018    K 3.7 07/31/2018    K 3.6 07/29/2018    CL 97 07/31/2018    CO2 27 07/31/2018    BUN 14 07/31/2018    LABALBU 3.8 07/26/2018    LABALBU 5.2 12/01/2011    CREATININE 0.9 07/31/2018    CALCIUM 9.2 07/31/2018    GFRAA >60 07/31/2018    LABGLOM >60 07/31/2018    GLUCOSE 131 07/31/2018    GLUCOSE 181 12/01/2011     Sodium:    Lab Results   Component Value Date     07/31/2018     Potassium:    Lab Results   Component Value Date    K 3.7 07/31/2018    K 3.6 07/29/2018     BUN/Creatinine:    Lab Results   Component Value Date    BUN 14 07/31/2018    CREATININE 0.9 07/31/2018     Hepatic Function Panel:    Lab Results   Component Value Date    ALKPHOS 87 07/26/2018    ALT 25 07/26/2018    AST 20 07/26/2018    PROT 7.2 07/26/2018    BILITOT 0.3 07/26/2018    BILIDIR <0.2 04/24/2017    IBILI 0.1 04/24/2017    LABALBU 3.8 07/26/2018    LABALBU 5.2 12/01/2011     PT/INR:    Lab Results   Component Value Date    PROTIME 12.2 07/26/2018    PROTIME 12.6 04/22/2011    INR 1.1 07/26/2018     Last 3 Troponin:    Lab Results   Component Value Date    TROPONINI <0.01 07/28/2018    TROPONINI <0.01 07/27/2018    TROPONINI <0.01 07/27/2018     HgBA1c:    Lab Results   Component Value Date    LABA1C 6.1 07/27/2018        Ct Abdomen Pelvis Wo Contrast    Result Date: 7/6/2018  EXAM:   CT Abdomen and Pelvis Without Intravenous Contrast EXAM DATE/TIME:   Exam ordered 7/6/2018 10:00 PM CLINICAL HISTORY:   60years old, female; Pain and injury or trauma; Fall; Initial encounter; Blunt; Generalized; Abdominal pain TECHNIQUE:   Axial computed tomography images of the abdomen and pelvis without intravenous contrast.  All CT scans at this facility use at least one of these dose optimization techniques: automated exposure control; mA and/or kV adjustment per patient size (includes targeted exams where dose is matched to clinical indication); or iterative reconstruction. Coronal and sagittal reformatted images were created and reviewed. COMPARISON:   CTB ABD & PELVIS W/O IV CONTRAST 2017-10-09 21:29 FINDINGS:   Artifacts:  Streak artifacts due to patient's obesity are noted which limit study. Lung bases:  Scattered bilateral air trapping with hazy ground glass lung parenchymal densities consistent with expiratory phase imaging. Heart:  Cardiomegaly. Mediastinum:  Small hiatal hernia. ABDOMEN:   Liver:  Liver is normal.   Gallbladder and bile ducts:  Gallbladder is absent. No ductal dilation. Pancreas:  Pancreas is normal.   Spleen:  Spleen is normal.   Adrenals:  No adrenal mass. Kidneys and ureters:  Bilateral renal cortical scarring and mild bilateral renal cortical atrophy. No hydronephrosis. Stomach and bowel:  Colonic diverticulosis present. No bowel obstruction. There is no definite evidence of acute gastritis, acute colitis or acute enteritis. PELVIS:   Appendix:  Unremarkable appendix.  No evidence of acute appendicitis. Bladder:  Unremarkable urinary bladder. Reproductive:  Prior hysterectomy with pelvic phleboliths. No pathologic adnexal mass. ABDOMEN and PELVIS:   Intraperitoneal space:  No pneumoperitoneum. No free intraperitoneal fluid. Bones/joints:  Mild multilevel degenerative changes present within the spine. No acute displaced fracture or osseous lesion. Soft tissues:  Stable fat containing ventral abdominal wall hernias with stable mild fluid and stranding within the more caudal umbilical hernia. Anterior abdominal wall subcutaneous fat scarring. Vasculature: Atherosclerotic arterial calcifications present. No aortic aneurysm. Lymph nodes:  No pathologically enlarged lymph nodes. No acute pathologic abnormality. Remainder of findings as described above. This report has been electronically signed by Kosta Hess MD.    Xr Ribs Bilateral (3 Views)    Result Date: 2018  Patient MRN:  [id number] : 1955 Age: 60 years Gender: Female Order Date:  2018 10:15 AM EXAM: XR RIBS BILATERAL (3 VIEWS) INDICATION:  s/p fall, need to r/o rib fractures s/p fall, need to r/o rib fractures COMPARISON: None NUMBER OF IMAGES:  9 FINDINGS:  AP views of the right and left ribs and PA chest were obtained. No discrete rib fractures are seen. The bones are normally mineralized. No osteoblastic or osteolytic lesions are seen. The heart is moderately enlarged with perihilar pulmonary vascular congestion and haziness in the perihilar area on the left. No pleural effusion or pneumothorax is seen. There has been previous coronary artery bypass surgery. There are degenerative changes in the mid to lower thoracic spine. There are surgical clips in the right upper quadrant of the abdomen. CONCLUSION:  1. No evidence of acute right or left rib fracture identified. As nondisplaced acute fractures may initially be occult, if symptoms do not improve, short-term follow-up is recommended in 7-10 days.

## 2018-08-01 ENCOUNTER — TELEPHONE (OUTPATIENT)
Dept: FAMILY MEDICINE CLINIC | Age: 63
End: 2018-08-01

## 2018-08-02 ENCOUNTER — TELEPHONE (OUTPATIENT)
Dept: FAMILY MEDICINE CLINIC | Age: 63
End: 2018-08-02

## 2018-08-02 ENCOUNTER — CARE COORDINATION (OUTPATIENT)
Dept: CARE COORDINATION | Age: 63
End: 2018-08-02

## 2018-08-02 NOTE — CARE COORDINATION
Spoke with Brett León discussed patient discharged from the hospital on 7/31/18 Primary diagnosis not on file.   Please follow up with patient to schedule a Transitional Care visit within 7 days from date of discharge from the hospital.

## 2018-08-02 NOTE — CARE COORDINATION
[facility] Transitions Initial Follow Up Call    Call within 2 business days of discharge: Yes    Patient: Wm Stapleton Patient : 1955   MRN: [id number]  Reason for Admission: There are no discharge diagnoses documented for the most recent discharge. Discharge Date: 18 RARS: Readmission Risk Score: 40  Left voice message for patient at [phone] (H) Identified myself, RN with [facility] ([facility]) requested patient please return the call today. Provided contact information.       Facility: Sendy Truong RN

## 2018-08-03 NOTE — CARE COORDINATION
[facility] Transitions Initial Follow Up Call    Call within 2 business days of discharge: Yes, initial call    Patient: Saddie Cushing Patient : 1955   MRN: [id number]  Reason for Admission: There are no discharge diagnoses documented for the most recent discharge. Discharge Date: 18 RARS: Readmission Risk Score: 40  Left voice for patient at [phone] (H identified myself, RN with [facility]) requested patient please return the call today. Provided contact information. Attempted to reach patient at [phone] (M) phone stopped ringing and there was clicking noise, unable to leave a message.        Facility: Soham Horowitz RN

## 2018-08-08 ENCOUNTER — OFFICE VISIT (OUTPATIENT)
Dept: CARDIOLOGY CLINIC | Age: 63
End: 2018-08-08
Payer: MEDICARE

## 2018-08-08 VITALS
HEART RATE: 90 BPM | BODY MASS INDEX: 50.79 KG/M2 | HEIGHT: 60 IN | WEIGHT: 258.7 LBS | RESPIRATION RATE: 16 BRPM | SYSTOLIC BLOOD PRESSURE: 128 MMHG | DIASTOLIC BLOOD PRESSURE: 88 MMHG

## 2018-08-08 DIAGNOSIS — I25.10 CORONARY ARTERY DISEASE INVOLVING NATIVE CORONARY ARTERY OF NATIVE HEART WITHOUT ANGINA PECTORIS: Primary | Chronic | ICD-10-CM

## 2018-08-08 PROCEDURE — 99214 OFFICE O/P EST MOD 30 MIN: CPT | Performed by: INTERNAL MEDICINE

## 2018-08-08 PROCEDURE — 1111F DSCHRG MED/CURRENT MED MERGE: CPT | Performed by: INTERNAL MEDICINE

## 2018-08-08 PROCEDURE — G8417 CALC BMI ABV UP PARAM F/U: HCPCS | Performed by: INTERNAL MEDICINE

## 2018-08-08 PROCEDURE — G8427 DOCREV CUR MEDS BY ELIG CLIN: HCPCS | Performed by: INTERNAL MEDICINE

## 2018-08-08 PROCEDURE — 3017F COLORECTAL CA SCREEN DOC REV: CPT | Performed by: INTERNAL MEDICINE

## 2018-08-08 PROCEDURE — 93000 ELECTROCARDIOGRAM COMPLETE: CPT | Performed by: INTERNAL MEDICINE

## 2018-08-08 PROCEDURE — 4004F PT TOBACCO SCREEN RCVD TLK: CPT | Performed by: INTERNAL MEDICINE

## 2018-08-08 PROCEDURE — G8598 ASA/ANTIPLAT THER USED: HCPCS | Performed by: INTERNAL MEDICINE

## 2018-08-08 RX ORDER — DIPHENOXYLATE HYDROCHLORIDE AND ATROPINE SULFATE 2.5; .025 MG/1; MG/1
1 TABLET ORAL 4 TIMES DAILY PRN
Status: ON HOLD | COMMUNITY
End: 2019-09-24 | Stop reason: HOSPADM

## 2018-08-08 RX ORDER — ISOSORBIDE MONONITRATE 60 MG/1
60 TABLET, EXTENDED RELEASE ORAL DAILY
Qty: 90 TABLET | Refills: 3 | Status: SHIPPED | OUTPATIENT
Start: 2018-08-08 | End: 2018-09-18 | Stop reason: SDUPTHER

## 2018-08-08 RX ORDER — ONDANSETRON 4 MG/1
4 TABLET, FILM COATED ORAL EVERY 8 HOURS PRN
COMMUNITY
End: 2019-01-09

## 2018-08-08 RX ORDER — LIDOCAINE 50 MG/G
OINTMENT TOPICAL PRN
COMMUNITY
End: 2018-11-21

## 2018-08-08 RX ORDER — METOPROLOL SUCCINATE 25 MG/1
25 TABLET, EXTENDED RELEASE ORAL DAILY
Qty: 90 TABLET | Refills: 3 | Status: SHIPPED | OUTPATIENT
Start: 2018-08-08 | End: 2018-09-18 | Stop reason: SDUPTHER

## 2018-08-08 RX ORDER — BUMETANIDE 1 MG/1
1 TABLET ORAL DAILY
Qty: 30 TABLET | Refills: 5 | Status: SHIPPED | OUTPATIENT
Start: 2018-08-08 | End: 2018-08-08 | Stop reason: SDUPTHER

## 2018-08-08 RX ORDER — CLOTRIMAZOLE AND BETAMETHASONE DIPROPIONATE 10; .64 MG/G; MG/G
CREAM TOPICAL 2 TIMES DAILY
COMMUNITY
End: 2018-08-29

## 2018-08-08 RX ORDER — MONTELUKAST SODIUM 4 MG/1
1 TABLET, CHEWABLE ORAL 3 TIMES DAILY
COMMUNITY
End: 2018-11-08

## 2018-08-08 RX ORDER — BISACODYL 10 MG
10 SUPPOSITORY, RECTAL RECTAL DAILY
COMMUNITY
End: 2018-11-08

## 2018-08-08 RX ORDER — SIMVASTATIN 20 MG
20 TABLET ORAL NIGHTLY
COMMUNITY
End: 2018-08-08 | Stop reason: SDUPTHER

## 2018-08-08 RX ORDER — DOCUSATE SODIUM 100 MG/1
100 CAPSULE, LIQUID FILLED ORAL 2 TIMES DAILY
COMMUNITY
End: 2018-11-08

## 2018-08-08 RX ORDER — FAMOTIDINE 40 MG/1
40 TABLET, FILM COATED ORAL DAILY
COMMUNITY
End: 2018-11-08

## 2018-08-08 RX ORDER — URSODIOL 300 MG/1
300 CAPSULE ORAL 2 TIMES DAILY
COMMUNITY
End: 2019-06-26

## 2018-08-08 RX ORDER — TRAMADOL HYDROCHLORIDE 50 MG/1
50 TABLET ORAL EVERY 6 HOURS PRN
COMMUNITY
End: 2018-10-24 | Stop reason: ALTCHOICE

## 2018-08-08 RX ORDER — VARENICLINE TARTRATE 0.5 MG/1
0.5 TABLET, FILM COATED ORAL 2 TIMES DAILY
COMMUNITY
End: 2018-08-08 | Stop reason: ALTCHOICE

## 2018-08-08 RX ORDER — BUMETANIDE 1 MG/1
1 TABLET ORAL DAILY
Qty: 30 TABLET | Refills: 5 | Status: SHIPPED | OUTPATIENT
Start: 2018-08-08 | End: 2018-11-08

## 2018-08-08 RX ORDER — DARIFENACIN HYDROBROMIDE 7.5 MG/1
7.5 TABLET, EXTENDED RELEASE ORAL DAILY
COMMUNITY
End: 2018-11-08

## 2018-08-08 RX ORDER — DICYCLOMINE HCL 20 MG
10 TABLET ORAL 2 TIMES DAILY
COMMUNITY
End: 2019-01-09

## 2018-08-08 RX ORDER — SIMVASTATIN 20 MG
20 TABLET ORAL NIGHTLY
Qty: 30 TABLET | Refills: 11 | Status: SHIPPED | OUTPATIENT
Start: 2018-08-08 | End: 2018-09-18 | Stop reason: SDUPTHER

## 2018-08-08 NOTE — PROGRESS NOTES
History:  Past Medical History:   Diagnosis Date    Asthma     C. difficile diarrhea 2015    CAD (coronary artery disease) 2011    Cellulitis and abscess of trunk 4/14/2015    Chronic back pain     Chronic kidney disease     Chronic systolic congestive heart failure ([address] 10/4/2013    Chronic venous insufficiency 10/11/2017    COPD (chronic obstructive pulmonary disease) (HCC)     Depression     Diabetes mellitus (HCC)     Diabetic neuropathy ([address]     Diverticulosis     Fatty liver 1/8/2016    per US    Glaucoma, open angle 2/1/2016    Mild-OU    Hepatic encephalopathy ([address] 02/07/2016    resolved    Hiatal hernia     History of blood transfusion     Hyperlipidemia     Hyperplastic colon polyp     Hypertension     Incontinence     Liver mass     Morbid obesity ([address]     Movement disorder     Myocardial infarction ([address] 2011    Osteoarthritis, generalized     Pain in both lower legs 10/11/2017    Peripheral vascular disease ([address]     Pneumonia 1/26/2014    Pulmonary edema     resolved    PVD (peripheral vascular disease) with claudication ([address] 8/30/2017    Sleep apnea     uses BI PAP    Tobacco abuse     Tobacco abuse 10/11/2017    Tubular adenoma polyp of rectum     Urinary tract infection due to ESBL Klebsiella 03/08/2017    Ventral hernia        Past Surgical History:  Past Surgical History:   Procedure Laterality Date    BREAST SURGERY  2000    bilateral reduction    BRONCHIAL BRUSH BIOPSY  1/25/2013    Dr Shayla Shetty  04/20/2015    Dr. Maranda Toro  12/2011     DR. Jelani Pedraza,  follows Dr Janes Salgado  11/15/2013    Dr Jamel Lim COLONOSCOPY  12/23/2016    Dr Yuniel Collins adenoma & hyperplastic polyps, melanosis coli (repeat one year 12/2017)    CORONARY ARTERY BYPASS GRAFT      ECHO COMPLETE  10/9/2013         ENDOSCOPY, COLON, DIAGNOSTIC  04/18/2017    GALLBLADDER SURGERY gallstones removed, [facility] 8/2017    HYSTERECTOMY      JOINT REPLACEMENT  2011    LEFT KNEE    KNEE SURGERY      left knee replacement    LAYER WOUND CLOSURE Left [id number]   Keesha Robert LIVER BIOPSY  1/8/2016     [facility]    POLYSOMNOGRAPHY  1/2016    [facility]    UPPER GASTROINTESTINAL ENDOSCOPY  12/20/12    UPPER GASTROINTESTINAL ENDOSCOPY  12/23/2016    Dr Flor Rivera UPPER GASTROINTESTINAL ENDOSCOPY  02/08/2017       Family History:  Family History   Problem Relation Age of Onset    Cancer Mother     Asthma Father        Social History:  Social History     Social History    Marital status:      Spouse name: N/A    Number of children: 2    Years of education: N/A     Occupational History    disability      Social History Main Topics    Smoking status: Current Every Day Smoker     Packs/day: 0.25     Years: 40.00     Types: Cigarettes     Start date: 8/10/1974    Smokeless tobacco: Never Used      Comment: 1 pack every 3 days (6-7 cig)    Alcohol use No    Drug use: Yes     Types: Marijuana      Comment: \"every 4th or 5th day out of the week\"    Sexual activity: Not on file     Other Topics Concern    Not on file     Social History Narrative    Pt lives with brother in her house-pt has never driven a car and depends on transportation       Allergies:   Allergies   Allergen Reactions    Latex Hives    Bee Venom Anaphylaxis    Dilaudid [Hydromorphone Hcl] Itching    Dye [Iodides] Hives and Shortness Of Breath    Percocet [Oxycodone-Acetaminophen] Shortness Of Breath and Itching    Keflex [Cephalexin] Itching and Rash    Lasix [Furosemide] Other (See Comments)     Pt states she cramps up and gets headaches    Levaquin [Levofloxacin In D5w] Hives    Lipitor      MUSCLE SPASMS    Lyrica [Pregabalin]      Dream disturbances    Morphine Hives    Naproxen      Unsure of reaction;pt states able to take Aleve without difficulties    Nefazodone Other (See Comments)    Norvasc [Amlodipine] Swelling  Oxycodone-Acetaminophen Swelling    Shellfish-Derived Products     Trazodone And Nefazodone        Current Medications:  Current Outpatient Prescriptions   Medication Sig Dispense Refill    bisacodyl (DULCOLAX) 10 MG suppository Place 10 mg rectally daily      clotrimazole-betamethasone (LOTRISONE) 1-0.05 % cream Apply topically 2 times daily Apply topically 2 times daily.  docusate sodium (COLACE) 100 MG capsule Take 100 mg by mouth 2 times daily      colestipol (COLESTID) 1 g tablet Take 1 g by mouth 3 times daily      darifenacin (ENABLEX) 7.5 MG extended release tablet Take 7.5 mg by mouth daily      dicyclomine (BENTYL) 20 MG tablet Take 10 mg by mouth 2 times daily      diphenoxylate-atropine (LOMOTIL) 2.5-0.025 MG per tablet Take 1 tablet by mouth 4 times daily as needed for Diarrhea. Owen Devoid Omega-3 Fatty Acids (FISH OIL OMEGA-3 PO) Take by mouth      lidocaine (XYLOCAINE) 5 % ointment Apply topically as needed for Pain Apply topically as needed.  nicotine (NICODERM CQ) 7 MG/24HR Place 1 patch onto the skin every 24 hours      famotidine (PEPCID) 40 MG tablet Take 40 mg by mouth daily      simvastatin (ZOCOR) 20 MG tablet Take 20 mg by mouth nightly      traMADol (ULTRAM) 50 MG tablet Take 50 mg by mouth every 6 hours as needed for Pain. Theadore Tahira  ursodiol (ACTIGALL) 300 MG capsule Take 300 mg by mouth 2 times daily      varenicline (CHANTIX) 0.5 MG tablet Take 0.5 mg by mouth 2 times daily      ondansetron (ZOFRAN) 4 MG tablet Take 4 mg by mouth every 8 hours as needed for Nausea or Vomiting      hydrocortisone 2.5 % cream Apply topically 2 times daily. 1 Tube 3    mometasone-formoterol (DULERA) 200-5 MCG/ACT inhaler Inhale 2 puffs into the lungs every 12 hours      vitamin D (ERGOCALCIFEROL) 28899 units CAPS capsule Take 50,000 Units by mouth once a week On Sundays      ketoconazole (NIZORAL) 2 % cream Apply topically daily Apply topically daily.       Multiple Vitamins-Minerals bumetanide (BUMEX) 1 MG tablet Take 1 tablet by mouth 2 times daily (Patient taking differently: Take 1 mg by mouth daily ) 30 tablet 5    nitroGLYCERIN (NITROSTAT) 0.4 MG SL tablet up to max of 3 total doses. If no relief after 1 dose, call 911. 25 tablet 3    BiPAP Machine MISC by Does not apply route nightly 27/23      oxybutynin (DITROPAN-XL) 5 MG extended release tablet Take 5 mg by mouth daily      senna (SENOKOT) 8.6 MG tablet Take 2 tablets by mouth 2 times daily      rosuvastatin (CRESTOR) 10 MG tablet Take 1 tablet by mouth nightly 30 tablet 0    AMITIZA 8 MCG CAPS capsule TAKE 1 CAPSULE BY MOUTH TWICE A DAY  2    sacubitril-valsartan (ENTRESTO) 24-26 MG per tablet Take 1 tablet by mouth 2 times daily      olopatadine (PATADAY) 0.2 % SOLN ophthalmic solution Place 1 drop into both eyes daily 1 Bottle 0    albuterol sulfate  (90 BASE) MCG/ACT inhaler Inhale 2 puffs into the lungs every 4 hours as needed for Wheezing or Shortness of Breath Indications: Dr. Ruiz Links        No current facility-administered medications for this visit. Physical Exam:  /88   Pulse 90   Resp 16   Ht 5' (1.524 m)   Wt 258 lb 11.2 oz (117.3 kg)   LMP 01/01/1990   BMI 50.52 kg/m²    Wt Readings from Last 3 Encounters:   08/08/18 258 lb 11.2 oz (117.3 kg)   07/31/18 251 lb 1.6 oz (113.9 kg)   07/10/18 263 lb (119.3 kg)     Appearance: Awake, alert and oriented x 3, no acute respiratory distress  Skin: Intact, no rash  Head: Normocephalic, atraumatic  Eyes: EOMI, no conjunctival erythema  ENMT: No pharyngeal erythema, MMM, no rhinorrhea  Neck: Supple, no elevated JVP, no carotid bruits  Lungs: Clear to auscultation bilaterally. No wheezes, rales, or rhonchi.   Cardiac: Regular rate and rhythm, +S1S2, no murmurs apparent  Abdomen: Soft, nontender, +bowel sounds  Extremities: Moves all extremities x 4, no lower extremity edema  Neurologic: No focal motor deficits apparent, normal mood and affect, alert and oriented x 3  Peripheral Pulses: Intact posterior tibial pulses bilaterally    Laboratory Tests:  Lab Results   Component Value Date    CREATININE 0.9 07/31/2018    BUN 14 07/31/2018     07/31/2018    K 3.7 07/31/2018    CL 97 (L) 07/31/2018    CO2 27 07/31/2018     Lab Results   Component Value Date    MG 1.9 07/31/2018     Lab Results   Component Value Date    WBC 6.8 07/31/2018    HGB 13.7 07/31/2018    HCT 42.6 07/31/2018    MCV 96.2 07/31/2018     07/31/2018     Lab Results   Component Value Date    ALT 25 07/26/2018    AST 20 07/26/2018    ALKPHOS 87 07/26/2018    BILITOT 0.3 07/26/2018     Lab Results   Component Value Date    CKTOTAL 88 01/05/2016    CKMB 1.2 01/05/2016    TROPONINI <0.01 07/28/2018    TROPONINI <0.01 07/27/2018    TROPONINI <0.01 07/27/2018     Lab Results   Component Value Date    INR 1.1 07/26/2018    INR 1.1 07/06/2018    INR 1.0 06/04/2018    PROTIME 12.2 07/26/2018    PROTIME 13.1 (H) 07/06/2018    PROTIME 12.1 06/04/2018     Lab Results   Component Value Date    TSH 1.560 11/07/2017     Lab Results   Component Value Date    LABA1C 6.1 (H) 07/27/2018     No results found for: EAG  Lab Results   Component Value Date    CHOL 222 (H) 06/05/2018    CHOL 159 12/01/2016    CHOL 186 04/07/2016     Lab Results   Component Value Date    TRIG 84 06/05/2018    TRIG 215 (H) 12/01/2016    TRIG 298 (H) 04/07/2016     Lab Results   Component Value Date    HDL 38 06/05/2018    HDL 27 12/01/2016    HDL 26 04/07/2016     Lab Results   Component Value Date    LDLCALC 167 (H) 06/05/2018    LDLCALC 89 12/01/2016    LDLCALC 100 (H) 04/07/2016     Lab Results   Component Value Date    LABVLDL 17 06/05/2018    LABVLDL 43 12/01/2016    LABVLDL 60 04/07/2016     No results found for: CHOLHDLRATIO    Cardiac Tests:  ECG: Normal sinus rhythm, RBBB abnormal ECG        Echocardiogram: 1/4/2017  LVEF 35-45%.  LV diffuse hypokinesis,mild MR  TTE- 6/5/2018:LVEF 65%, moderate LVH, stage II diastolic

## 2018-08-09 ENCOUNTER — OFFICE VISIT (OUTPATIENT)
Dept: FAMILY MEDICINE CLINIC | Age: 63
End: 2018-08-09
Payer: MEDICARE

## 2018-08-09 VITALS
SYSTOLIC BLOOD PRESSURE: 165 MMHG | DIASTOLIC BLOOD PRESSURE: 83 MMHG | HEART RATE: 86 BPM | BODY MASS INDEX: 50.65 KG/M2 | RESPIRATION RATE: 18 BRPM | OXYGEN SATURATION: 97 % | WEIGHT: 258 LBS | HEIGHT: 60 IN

## 2018-08-09 DIAGNOSIS — G47.33 OSA AND COPD OVERLAP SYNDROME (HCC): ICD-10-CM

## 2018-08-09 DIAGNOSIS — Z09 HOSPITAL DISCHARGE FOLLOW-UP: Primary | ICD-10-CM

## 2018-08-09 DIAGNOSIS — F33.0 MAJOR DEPRESSIVE DISORDER, RECURRENT EPISODE, MILD (HCC): ICD-10-CM

## 2018-08-09 DIAGNOSIS — J44.9 OSA AND COPD OVERLAP SYNDROME (HCC): ICD-10-CM

## 2018-08-09 DIAGNOSIS — F17.210 CIGARETTE SMOKER: Chronic | ICD-10-CM

## 2018-08-09 DIAGNOSIS — I10 ESSENTIAL HYPERTENSION: Chronic | ICD-10-CM

## 2018-08-09 DIAGNOSIS — E66.01 MORBID OBESITY DUE TO EXCESS CALORIES (HCC): ICD-10-CM

## 2018-08-09 DIAGNOSIS — I73.9 PVD (PERIPHERAL VASCULAR DISEASE) WITH CLAUDICATION (HCC): ICD-10-CM

## 2018-08-09 DIAGNOSIS — E11.8 TYPE 2 DIABETES MELLITUS WITH COMPLICATION, WITH LONG-TERM CURRENT USE OF INSULIN (HCC): Chronic | ICD-10-CM

## 2018-08-09 DIAGNOSIS — Z79.4 TYPE 2 DIABETES MELLITUS WITH COMPLICATION, WITH LONG-TERM CURRENT USE OF INSULIN (HCC): Chronic | ICD-10-CM

## 2018-08-09 LAB
EKG ATRIAL RATE: 75 BPM
EKG P AXIS: 48 DEGREES
EKG P-R INTERVAL: 158 MS
EKG Q-T INTERVAL: 466 MS
EKG QRS DURATION: 140 MS
EKG QTC CALCULATION (BAZETT): 520 MS
EKG R AXIS: 33 DEGREES
EKG T AXIS: 100 DEGREES
EKG VENTRICULAR RATE: 75 BPM

## 2018-08-09 PROCEDURE — 1111F DSCHRG MED/CURRENT MED MERGE: CPT | Performed by: FAMILY MEDICINE

## 2018-08-09 PROCEDURE — 99495 TRANSJ CARE MGMT MOD F2F 14D: CPT | Performed by: FAMILY MEDICINE

## 2018-08-09 RX ORDER — GUAIFENESIN 600 MG/1
600 TABLET, EXTENDED RELEASE ORAL 2 TIMES DAILY
Qty: 30 TABLET | Refills: 0 | Status: SHIPPED | OUTPATIENT
Start: 2018-08-09 | End: 2018-08-29

## 2018-08-09 ASSESSMENT — ENCOUNTER SYMPTOMS
ABDOMINAL PAIN: 0
RHINORRHEA: 0
SORE THROAT: 0
VOMITING: 0
DIARRHEA: 0
CONSTIPATION: 0
TROUBLE SWALLOWING: 0
SINUS PAIN: 0
COUGH: 0
NAUSEA: 0
SHORTNESS OF BREATH: 1
BACK PAIN: 1

## 2018-08-09 NOTE — PROGRESS NOTES
Post-Discharge Transitional Care Management Services or Hospital Follow Up      Norma Levine   YOB: 1955    Date of Office Visit:  8/9/2018  Date of Hospital Admission: 7/26/18  Date of Hospital Discharge: 7/31/18  Readmission Risk Score(high >=14%.  Medium >=10%):Readmission Risk Score: 40    Care management risk score Rising risk (score 2-5) and Complex Care (Scores >=6): 8     Non face to face  following discharge, date last encounter closed (first attempt may have been earlier): 8/6/2018  1:07 PM 8/6/2018  1:07 PM    Call initiated 2 business days of discharge: Yes     Patient Active Problem List   Diagnosis    Morbid obesity due to excess calories ([address]    Hyperlipidemia    DAYAN and COPD overlap syndrome ([address]    Vitamin D insufficiency    Essential hypertension    GERD (gastroesophageal reflux disease)    Major depressive disorder, recurrent episode, mild ([address]    Diabetic polyneuropathy associated with type 2 diabetes mellitus ([address]    Chronic passive hepatic congestion    Mixed incontinence urge and stress    Chronic back pain - d/t muscle spasm    Glaucoma, open angle    Elevated CA 19-9 level    Lumbar stenosis    Spondylosis 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    Lumbar disc herniation    Asymmetric septal hypertrophy (HCC)    Non-compliance    Hiatal hernia    Melanosis coli    Coronary artery disease involving native coronary artery of native heart without angina pectoris    DM2 (diabetes mellitus, type 2) ([address]    Cigarette smoker    Marijuana use, smoked    Ischemic cardiomyopathy    PVD (peripheral vascular disease) with claudication (HCC)    Chronic venous insufficiency    History of non-ST elevation myocardial infarction (NSTEMI)    QT prolongation       Allergies   Allergen Reactions    Latex Hives    Bee Venom Anaphylaxis    Dilaudid [Hydromorphone Hcl] Itching    Dye [Iodides] Hives and Shortness Of Breath    Percocet [Oxycodone-Acetaminophen] Shortness Of Breath and Itching    Keflex [Cephalexin] Itching and Rash    Lasix [Furosemide] Other (See Comments)     Pt states she cramps up and gets headaches    Levaquin [Levofloxacin In D5w] Hives    Lipitor      MUSCLE SPASMS    Lyrica [Pregabalin]      Dream disturbances    Morphine Hives    Naproxen      Unsure of reaction;pt states able to take Aleve without difficulties    Nefazodone Other (See Comments)    Norvasc [Amlodipine] Swelling    Oxycodone-Acetaminophen Swelling    Shellfish-Derived Products     Trazodone And Nefazodone        Medications listed as ordered at the time of discharge from hospital   [facility] Medication Instructions [facility]:    Printed on:08/09/18 1457   Medication Information                      ACCU-CHEK FASTCLIX LANCETS MISC  5 times daily             acetaminophen (TYLENOL) 500 MG tablet  Take 1,000 mg by mouth every 6 hours as needed for Pain             albuterol (PROVENTIL) (2.5 MG/3ML) 0.083% nebulizer solution  Take 2.5 mg by nebulization every 6 hours as needed for Wheezing             aspirin 81 MG chewable tablet  TAKE 1 TABLET BY MOUTH DAILY             BiPAP Machine MISC  by Does not apply route nightly 27/23             bisacodyl (DULCOLAX) 10 MG suppository  Place 10 mg rectally daily             blood glucose test strips (ACCU-CHEK COMPACT TEST DRUM) strip  1 each by In Vitro route daily As needed. bumetanide (BUMEX) 1 MG tablet  Take 1 tablet by mouth daily             cetirizine (ZYRTEC) 10 MG tablet  Take 10 mg by mouth daily             clotrimazole-betamethasone (LOTRISONE) 1-0.05 % cream  Apply topically 2 times daily Apply topically 2 times daily.              colestipol (COLESTID) 1 g tablet  Take 1 g by mouth 3 times daily             darifenacin (ENABLEX) 7.5 MG extended release tablet  Take 7.5 mg by mouth daily             dicyclomine (BENTYL) 20 MG tablet  Take 10 mg by mouth 2 times daily diphenoxylate-atropine (LOMOTIL) 2.5-0.025 MG per tablet  Take 1 tablet by mouth 4 times daily as needed for Diarrhea. .             docusate sodium (COLACE) 100 MG capsule  Take 100 mg by mouth 2 times daily             DULoxetine (CYMBALTA) 60 MG extended release capsule  TAKE ONE CAPSULE BY MOUTH TWICE A DAY             famotidine (PEPCID) 40 MG tablet  Take 40 mg by mouth daily             fluticasone (FLONASE) 50 MCG/ACT nasal spray  1 spray by Nasal route 2 times daily             gabapentin (NEURONTIN) 300 MG capsule  Take 2 capsules by mouth 3 times daily for 30 days. Sherlean Narrow guaiFENesin (MUCINEX) 600 MG extended release tablet  Take 1 tablet by mouth 2 times daily             hydrALAZINE (APRESOLINE) 100 MG tablet  Take 1 tablet by mouth 3 times daily             hydrocortisone 2.5 % cream  Apply topically 2 times daily. insulin aspart (NOVOLOG FLEXPEN) 100 UNIT/ML injection pen  Inject 35 Units into the skin 3 times daily (before meals)             Insulin Degludec (TRESIBA FLEXTOUCH) 200 UNIT/ML SOPN  Inject 70 Units into the skin 2 times daily             Insulin Pen Needle (B-D UF III MINI PEN NEEDLES) 31G X 5 MM MISC  1 each by Does not apply route daily             isosorbide mononitrate (IMDUR) 60 MG extended release tablet  Take 1 tablet by mouth daily             ketoconazole (NIZORAL) 2 % cream  Apply topically daily Apply topically daily. lidocaine (XYLOCAINE) 5 % ointment  Apply topically as needed for Pain Apply topically as needed.              metoprolol succinate (TOPROL XL) 25 MG extended release tablet  Take 1 tablet by mouth daily             mometasone-formoterol (DULERA) 200-5 MCG/ACT inhaler  Inhale 2 puffs into the lungs every 12 hours             montelukast (SINGULAIR) 10 MG tablet  Take 10 mg by mouth nightly              Multiple Vitamins-Minerals (THERAPEUTIC MULTIVITAMIN-MINERALS) tablet  Take 1 tablet by mouth daily             nitroGLYCERIN (NITROSTAT) 0.4 MG SL tablet  up to max of 3 total doses. If no relief after 1 dose, call 911. Omega-3 Fatty Acids (FISH OIL OMEGA-3 PO)  Take by mouth             ondansetron (ZOFRAN) 4 MG tablet  Take 4 mg by mouth every 8 hours as needed for Nausea or Vomiting             pantoprazole (PROTONIX) 40 MG tablet  TAKE 1 TABLET BY MOUTH TWICE A DAY             sacubitril-valsartan (ENTRESTO) 24-26 MG per tablet  Take 1 tablet by mouth 2 times daily             simvastatin (ZOCOR) 20 MG tablet  Take 1 tablet by mouth nightly             SOFT TOUCH LANCETS MISC  Test 3 times daily             traMADol (ULTRAM) 50 MG tablet  Take 50 mg by mouth every 6 hours as needed for Pain. .             traZODone (DESYREL) 100 MG tablet  Take 1 tablet by mouth nightly             ursodiol (ACTIGALL) 300 MG capsule  Take 300 mg by mouth 2 times daily             vitamin D (ERGOCALCIFEROL) 85713 units CAPS capsule  Take 50,000 Units by mouth once a week On Sundays                   Medications marked \"taking\" at this time  Outpatient Prescriptions Marked as Taking for the 8/9/18 encounter (Office Visit) with Stuart Bueno, DO   Medication Sig Dispense Refill    guaiFENesin (MUCINEX) 600 MG extended release tablet Take 1 tablet by mouth 2 times daily 30 tablet 0    Insulin Degludec (TRESIBA FLEXTOUCH) 200 UNIT/ML SOPN Inject 70 Units into the skin 2 times daily 15 pen 2        Medications patient taking as of now reconciled against medications ordered at time of hospital discharge: Yes    Chief Complaint   Patient presents with   [facility]     transitional of care       HPI  She was admitted on 7/26/18 to 7/31/18. Diagnosis was acute exacerbation of heart failure, COPD, chronic diarrhea. Of note the hospital she was on oxygen via nasal cannula an attempt was to wean her off but failed. On room air her saturation was between 85-96%. She does not use home oxygen but does use a BiPAP at night.  She is going to follow-up with pulmonology as an outpatient. She did follow up with cardiology already who did continue her entresto twice a day. She is to decrease her Bumex to 1 mg daily. Decrease metoprolol to 25 mg daily. Patient states that currently she feels well. She is trying to quit smoking and has cut down to a few cigarettes a day. She does wish to quit smoking as she does not want to smoke because of her grandson. She also went to the [facility] and saw a vascular surgeon recommendation at this time that her leg pain is likely multifactorial with the largest. Neurogenic in nature. ABIs are unchanged. They did recommend lifestyle modification and smoking cessation. Patient understands that she does need to quit smoking and I stated above she has cut back. I did refer her to pain management. Patient has not been scheduled yet with pain management however they did reach out to her to schedule. I did recommend for her to call them back. As her breathing she does get short of breath often do is back to her baseline. She does admit to some sputum production that is thickened nature. She has not been taking Mucinex. As for her diabetes her sugars are ranging 50s to 80s. I did explain that this is too low and we need to adjust her sugars. She is currently taking tresiba 80 units twice a day. Inpatient course: Discharge summary reviewed- see chart. Review of Systems   Constitutional: Positive for fatigue. Negative for chills and fever. Uses walker   HENT: Positive for postnasal drip. Negative for congestion, rhinorrhea, sinus pain, sore throat and trouble swallowing. Respiratory: Positive for shortness of breath. Negative for cough. Cardiovascular: Negative for chest pain and leg swelling. Gastrointestinal: Negative for abdominal pain, constipation, diarrhea, nausea and vomiting. Endocrine: Negative for cold intolerance and heat intolerance. Genitourinary: Negative for frequency and urgency. Musculoskeletal: Positive for arthralgias, back pain, gait problem, myalgias and neck pain. Skin: Negative for rash. Allergic/Immunologic: Positive for environmental allergies. Neurological: Positive for dizziness. Negative for syncope, light-headedness and headaches. Hematological: Negative for adenopathy. Psychiatric/Behavioral: Negative for dysphoric mood. The patient is not nervous/anxious. Vitals:    08/09/18 1508 08/09/18 1512   BP: (!) 173/99 (!) 165/83   Site: Left Arm Right Arm   Pulse: 93 86   Resp: 18    SpO2: 97%    Weight: 258 lb (117 kg)    Height: 5' (1.524 m)      Body mass index is 50.39 kg/m². Wt Readings from Last 3 Encounters:   08/09/18 258 lb (117 kg)   08/08/18 258 lb 11.2 oz (117.3 kg)   07/31/18 251 lb 1.6 oz (113.9 kg)     BP Readings from Last 3 Encounters:   08/09/18 (!) 165/83   08/08/18 128/88   07/31/18 (!) 124/55       Physical Exam   Constitutional: She is oriented to person, place, and time. She appears well-developed and well-nourished. HENT:   Head: Normocephalic and atraumatic. Right Ear: External ear normal.   Left Ear: External ear normal.   Nose: Nose normal.   Mouth/Throat: Oropharynx is clear and moist.   Eyes: Conjunctivae and EOM are normal. Pupils are equal, round, and reactive to light. Neck: Normal range of motion. Neck supple. No JVD present. Cardiovascular: Normal rate, regular rhythm and normal heart sounds. No murmur heard. No edema, pvd changes noted on LE   Pulmonary/Chest: Effort normal and breath sounds normal. No respiratory distress. She has no wheezes. She has no rales. Abdominal: Soft. Bowel sounds are normal. There is no tenderness. Musculoskeletal: Tenderness: lumbar, cervical spinal musculatrue. Uses walker   Lymphadenopathy:     She has no cervical adenopathy. Neurological: She is alert and oriented to person, place, and time. Coordination normal.   Skin: Skin is warm and dry. No rash noted.    Psychiatric: She has

## 2018-08-13 RX ORDER — DULOXETIN HYDROCHLORIDE 60 MG/1
CAPSULE, DELAYED RELEASE ORAL
Qty: 180 CAPSULE | Refills: 0 | Status: SHIPPED | OUTPATIENT
Start: 2018-08-13 | End: 2018-11-19 | Stop reason: SDUPTHER

## 2018-08-13 NOTE — TELEPHONE ENCOUNTER
Received incoming fax from patient [facility] pharmacy for Duloxetine 60mg, last Rx written on 3/29/18 with 0 refills, last office visit here with Dr. Abhay August on 3/29/18, no follow up scheduled. Please advise.

## 2018-08-14 ENCOUNTER — OFFICE VISIT (OUTPATIENT)
Dept: ORTHOPEDIC SURGERY | Age: 63
End: 2018-08-14
Payer: MEDICARE

## 2018-08-14 VITALS — BODY MASS INDEX: 46.01 KG/M2 | TEMPERATURE: 98 F | HEIGHT: 62 IN | WEIGHT: 250 LBS

## 2018-08-14 DIAGNOSIS — M65.341 TRIGGER FINGER, RIGHT RING FINGER: ICD-10-CM

## 2018-08-14 DIAGNOSIS — M17.11 PRIMARY OSTEOARTHRITIS OF RIGHT KNEE: Primary | ICD-10-CM

## 2018-08-14 PROCEDURE — 3017F COLORECTAL CA SCREEN DOC REV: CPT | Performed by: ORTHOPAEDIC SURGERY

## 2018-08-14 PROCEDURE — 20550 NJX 1 TENDON SHEATH/LIGAMENT: CPT | Performed by: ORTHOPAEDIC SURGERY

## 2018-08-14 PROCEDURE — G8427 DOCREV CUR MEDS BY ELIG CLIN: HCPCS | Performed by: ORTHOPAEDIC SURGERY

## 2018-08-14 PROCEDURE — 4004F PT TOBACCO SCREEN RCVD TLK: CPT | Performed by: ORTHOPAEDIC SURGERY

## 2018-08-14 PROCEDURE — 99214 OFFICE O/P EST MOD 30 MIN: CPT | Performed by: ORTHOPAEDIC SURGERY

## 2018-08-14 PROCEDURE — 1111F DSCHRG MED/CURRENT MED MERGE: CPT | Performed by: ORTHOPAEDIC SURGERY

## 2018-08-14 PROCEDURE — G8417 CALC BMI ABV UP PARAM F/U: HCPCS | Performed by: ORTHOPAEDIC SURGERY

## 2018-08-14 PROCEDURE — G8598 ASA/ANTIPLAT THER USED: HCPCS | Performed by: ORTHOPAEDIC SURGERY

## 2018-08-14 RX ORDER — TRIAMCINOLONE ACETONIDE 40 MG/ML
10 INJECTION, SUSPENSION INTRA-ARTICULAR; INTRAMUSCULAR ONCE
Status: COMPLETED | OUTPATIENT
Start: 2018-08-14 | End: 2018-08-14

## 2018-08-14 RX ADMIN — TRIAMCINOLONE ACETONIDE 10 MG: 40 INJECTION, SUSPENSION INTRA-ARTICULAR; INTRAMUSCULAR at 15:15

## 2018-08-14 NOTE — PROGRESS NOTES
Chief Complaint   Patient presents with    Finger Pain     Right hand, ring finger, gets stuck down    Knee Pain     Right Knee. Subjective:     Patient ID: Aide Johnson is a 60 y.o. .  female    Knee Pain  Patient complains of bilateral knee pain. Patient had euflexxa with no relief. is also having triggering of her right ring finger. Past Medical History:   Diagnosis Date    Asthma     C. difficile diarrhea 2015    CAD (coronary artery disease) 2011    Cellulitis and abscess of trunk 4/14/2015    Chronic back pain     Chronic kidney disease     Chronic systolic congestive heart failure ([address] 10/4/2013    Chronic venous insufficiency 10/11/2017    COPD (chronic obstructive pulmonary disease) (HCC)     Depression     Diabetes mellitus (HCC)     Diabetic neuropathy ([address]     Diverticulosis     Fatty liver 1/8/2016    per US    Glaucoma, open angle 2/1/2016    Mild-OU    Hepatic encephalopathy ([address] 02/07/2016    resolved    Hiatal hernia     History of blood transfusion     Hyperlipidemia     Hyperplastic colon polyp     Hypertension     Incontinence     Liver mass     Morbid obesity ([address]     Movement disorder     Myocardial infarction ([address] 2011    Osteoarthritis, generalized     Pain in both lower legs 10/11/2017    Peripheral vascular disease ([address]     Pneumonia 1/26/2014    Pulmonary edema     resolved    PVD (peripheral vascular disease) with claudication ([address] 8/30/2017    Sleep apnea     uses BI PAP    Tobacco abuse     Tobacco abuse 10/11/2017    Tubular adenoma polyp of rectum     Urinary tract infection due to ESBL Klebsiella 03/08/2017    Ventral hernia      Past Surgical History:   Procedure Laterality Date    BREAST SURGERY  2000    bilateral reduction    BRONCHIAL BRUSH BIOPSY  1/25/2013    Dr Annalee Henderson  04/20/2015    Dr. Nayely Mcwilliams  12/2011     DR. Rahel Salvador,  follows Dr Yanet Guerra Disp: , Rfl:     lidocaine (XYLOCAINE) 5 % ointment, Apply topically as needed for Pain Apply topically as needed. , Disp: , Rfl:     famotidine (PEPCID) 40 MG tablet, Take 40 mg by mouth daily, Disp: , Rfl:     traMADol (ULTRAM) 50 MG tablet, Take 50 mg by mouth every 6 hours as needed for Pain. ., Disp: , Rfl:     ursodiol (ACTIGALL) 300 MG capsule, Take 300 mg by mouth 2 times daily, Disp: , Rfl:     ondansetron (ZOFRAN) 4 MG tablet, Take 4 mg by mouth every 8 hours as needed for Nausea or Vomiting, Disp: , Rfl:     metoprolol succinate (TOPROL XL) 25 MG extended release tablet, Take 1 tablet by mouth daily, Disp: 90 tablet, Rfl: 3    sacubitril-valsartan (ENTRESTO) 24-26 MG per tablet, Take 1 tablet by mouth 2 times daily, Disp: 60 tablet, Rfl: 3    simvastatin (ZOCOR) 20 MG tablet, Take 1 tablet by mouth nightly, Disp: 30 tablet, Rfl: 11    isosorbide mononitrate (IMDUR) 60 MG extended release tablet, Take 1 tablet by mouth daily, Disp: 90 tablet, Rfl: 3    bumetanide (BUMEX) 1 MG tablet, Take 1 tablet by mouth daily, Disp: 30 tablet, Rfl: 5    hydrocortisone 2.5 % cream, Apply topically 2 times daily. , Disp: 1 Tube, Rfl: 3    mometasone-formoterol (DULERA) 200-5 MCG/ACT inhaler, Inhale 2 puffs into the lungs every 12 hours, Disp: , Rfl:     vitamin D (ERGOCALCIFEROL) 26056 units CAPS capsule, Take 50,000 Units by mouth once a week On Sundays, Disp: , Rfl:     ketoconazole (NIZORAL) 2 % cream, Apply topically daily Apply topically daily. , Disp: , Rfl:     Multiple Vitamins-Minerals (THERAPEUTIC MULTIVITAMIN-MINERALS) tablet, Take 1 tablet by mouth daily, Disp: , Rfl:     insulin aspart (NOVOLOG FLEXPEN) 100 UNIT/ML injection pen, Inject 35 Units into the skin 3 times daily (before meals), Disp: , Rfl:     albuterol (PROVENTIL) (2.5 MG/3ML) 0.083% nebulizer solution, Take 2.5 mg by nebulization every 6 hours as needed for Wheezing, Disp: , Rfl:     acetaminophen (TYLENOL) 500 MG tablet, Take 1,000 mg by mouth every 6 hours as needed for Pain, Disp: , Rfl:     cetirizine (ZYRTEC) 10 MG tablet, Take 10 mg by mouth daily, Disp: , Rfl:     ACCU-CHEK FASTCLIX LANCETS MISC, 5 times daily, Disp: , Rfl:     traZODone (DESYREL) 100 MG tablet, Take 1 tablet by mouth nightly, Disp: 30 tablet, Rfl: 2    Insulin Pen Needle (B-D UF III MINI PEN NEEDLES) 31G X 5 MM MISC, 1 each by Does not apply route daily, Disp: 100 each, Rfl: 3    blood glucose test strips (ACCU-CHEK COMPACT TEST DRUM) strip, 1 each by In Vitro route daily As needed. , Disp: 100 each, Rfl: 3    SOFT TOUCH LANCETS MISC, Test 3 times daily, Disp: 100 each, Rfl: 3    pantoprazole (PROTONIX) 40 MG tablet, TAKE 1 TABLET BY MOUTH TWICE A DAY, Disp: , Rfl: 2    hydrALAZINE (APRESOLINE) 100 MG tablet, Take 1 tablet by mouth 3 times daily, Disp: 90 tablet, Rfl: 0    montelukast (SINGULAIR) 10 MG tablet, Take 10 mg by mouth nightly , Disp: , Rfl:     aspirin 81 MG chewable tablet, TAKE 1 TABLET BY MOUTH DAILY, Disp: , Rfl: 0    fluticasone (FLONASE) 50 MCG/ACT nasal spray, 1 spray by Nasal route 2 times daily, Disp: 1 Bottle, Rfl: 3    nitroGLYCERIN (NITROSTAT) 0.4 MG SL tablet, up to max of 3 total doses. If no relief after 1 dose, call 911., Disp: 25 tablet, Rfl: 3    BiPAP Machine MISC, by Does not apply route nightly 27/23, Disp: , Rfl:     gabapentin (NEURONTIN) 300 MG capsule, Take 2 capsules by mouth 3 times daily for 30 days. ., Disp: 180 capsule, Rfl: 2  Allergies   Allergen Reactions    Latex Hives    Bee Venom Anaphylaxis    Dilaudid [Hydromorphone Hcl] Itching    Dye [Iodides] Hives and Shortness Of Breath    Percocet [Oxycodone-Acetaminophen] Shortness Of Breath and Itching    Keflex [Cephalexin] Itching and Rash    Lasix [Furosemide] Other (See Comments)     Pt states she cramps up and gets headaches    Levaquin [Levofloxacin In D5w] Hives    Lipitor      MUSCLE SPASMS    Lyrica [Pregabalin]      Dream disturbances    Morphine Hives    Naproxen      Unsure of reaction;pt states able to take Aleve without difficulties    Nefazodone Other (See Comments)    Norvasc [Amlodipine] Swelling    Oxycodone-Acetaminophen Swelling    Shellfish-Derived Products     Trazodone And Nefazodone      Social History     Social History    Marital status:      Spouse name: N/A    Number of children: 2    Years of education: N/A     Occupational History    disability      Social History Main Topics    Smoking status: Current Every Day Smoker     Packs/day: 0.25     Years: 40.00     Types: Cigarettes     Start date: 8/10/1974    Smokeless tobacco: Never Used      Comment: 1 pack every 3 days (6-7 cig)    Alcohol use No    Drug use: Yes     Types: Marijuana      Comment: \"every 4th or 5th day out of the week\"    Sexual activity: Not on file     Other Topics Concern    Not on file     Social History Narrative    Pt lives with brother in her house-pt has never driven a car and depends on transportation     Family History   Problem Relation Age of Onset    Cancer Mother     Asthma Father          REVIEW OF SYSTEMS:     General/Constitution:  (-)weight loss, (-)fever, (-)chills, (-)weakness. Skin: (-) rash,(-) psoriasis,(-) eczema, (-)skin cancer. Musculoskeletal: (-) fractures,  (-) dislocations,(-) collagen vascular disease, (-) fibromyalgia, (-) multiple sclerosis, (-) muscular dystrophy, (-) RSD,(-) joint pain (-)swelling, (-) joint pain,swelling. Neurologic: (-) epilepsy, (-)seizures,(-) brain tumor,(-) TIA, (-)stroke, (-)headaches, (-)Parkinson disease,(-) memory loss, (-) LOC. Cardiovascular: (-) Chest pain, (-) swelling in legs/feet, (-) SOB, (-) cramping in legs/feet with walking. Respiratory: (-) SOB, (-) Coughing, (-) night sweats. GI: (-) nausea, (-) vomiting, (-) diarrhea, (-) blood in stool, (-) gastric ulcer.   Psychiatric: (-) Depression, (-) Anxiety, (-) bipolar disease, (-) Alzheimer's Disease  Allergic/Immunologic: (-) allergies latex, (-) allergies metal, (-) skin sensitivity. Hematlogic: (-) anemia, (-) blood transfusion, (-) DVT/PE, (-) Clotting disorders      Subjective:    Constitution:    Ht. 5 ft 2.5 in. , Wt. 252 lbs. Psycihatric:    The patient is alert and oriented x 3, appears to be stated age and in no distress. Respiratory:    Respiratory effort is not labored. Patient is not gasping. Palpation of the chest reveals no tactile fremitus. Skin:    Upon inspection: the skin appears warm, dry and intact. There is  a previous scar over the affected area. There is any cellulitis, lymphedema or cutaneous lesions noted in the lower extremities. Upon palpation there is no induration noted. Neurologic:    Gait: antalgic and using wheelchair;  Motor exam of the lower extremities show ; quadriceps, hamstrings, foot dorsi and plantar flexors intact R.  5/5 and L. 5/5. Deep tendon reflexes are 2/4 at the knees and 2/4 at the ankles with strong extensor hallicus longus motor strength bilaterally. Sensory to both feet is intact to all sensory roots. Cardiovascular: The vascular exam is normal and is well perfused to distal extremities. Distal pulses DP/PT: R. 2+; L. 2+. There is cap refill noted less than two seconds in all digits. There is not edema of the bilateral lower extremities. There is not varicosities noted in the distal extremities. Lymph:    Upon palpation,  there is no lymphadenopathy noted in bilateral lower extremities. Musculoskeletal:      Gait: antalgic and using wheelchair; examination of the nails and digits reveal no cyanosis or clubbing. Lumbar exam:    On visual inspection, there is not deformity of the spine. full range of motion, no tenderness, palpable spasm or pain on motion. Special tests: Straight Leg Raise negative, Linda test negative. Hip exam-     Upon inspection, there is not deformity noted.   Upon palpation there is not tenderness. ROM: is  full and symmetrical.   Strength: Hip Flexors 5/5; Hip Abductors 5/5; Hip Adduction 5/5. Knee exam    Bilateral knee exam shows;  range of motion of R. Knee is 0 to 105, and L. Knee is 0 to 105. The patient does have  pain on motion, effusion is mild, there is tenderness over the  medial, lateral, anterior region, there are not any masses, there is not ligamentous instability, there is  deformity noted. Knee exam: bilateral positive for moderate crepitations, some mild tenderness laxity is not noted with stress. There is not a popliteal cyst.    R. Knee:  Lachman's negative, Anterior Drawer negative, Posterior Drawer negative  Darin's negative, Thallasy  negative,   PF grind test negative, Apprehension test negative, Patellar J sign  negative  L. Knee:  Lachman's negative, Anterior Drawer negative, Posterior Drawer negative  Darin's negative, Thallasy  negative,   PF grind test negative, Apprehension test negative,  Patellar J sign  negative    Elbow exam:  Evaluation of the elbow, reveals no signs of swelling or deformity. ROM is 0-130. There is not instability with varus/valgus stresses. Motor strength is 5/5 with flexion/extension. Wrist exam:  Inspection of the bilateral upper extremities, there is no evidence of deformity of the wrist.  ROM Wrist ROM R wrist DF 80, VF 80, L wrist DF 80, VF 80, R pronation 90/ supination 90, L pronation 90/supination 90. Motor strength is 4/5 with Dorsiflexion/Volarflexion/Supination/Pronation. Motor and sensation is intact and symmetric throughout the bilateral upper extremities in the median, ulnar and radial , musclcutaneous, and axillary nerve distributions. Hand exam:  The skin overlying the hand is  intact. There is not evidence of scar, lesion, laceration, or abrasion. The motion in the small joints of the hand are intact with no stiffness or deformity.   The ROM in the MCP flexion 90/ extension 0 , PIP flexion 90/ extension 0, DIP flexion 70/ extension 0. There is not rotational deformity. There is no masses or adenopathy in bilateral upper extremities. Radial pulses are 2+ and symmetric bilaterally. Capillary refill is intact and < 2 seconds. Motor strength is 5/5 with flexion and extension of the small finger joints. Catching of right ring finger. Right:  Phallens sign(-), Tinnells sign (-), Median nerve compression test (-),  Finklesteins (-), [facility] Grind test (-), [employer](-). Left:    Phallens sign(-), Tinnells sign (-), Median nerve compression test (-),  Finklesteins (-), [facility] Grind test (-), [employer](-). Xray Exam:  Left knee-  There is a left total knee arthroplasty. There are degenerative   changes seen. No fractures are seen. Right knee-  There is moderate to severe visualized right femorotibial joint space   narrowing medially associated with moderate size osteophytes arising   from femoral condyles, tibial plateaus on dorsal aspect of the   patella. Mild diffuse osteopenia. Radiographic findings reviewed with patient      Assessment:  Encounter Diagnoses   Name Primary?  Primary osteoarthritis of right knee Yes    Trigger finger, right ring finger            Plan:  Natural history and expected course discussed. Questions answered. Educational materials distributed. Verbal and written consent for the injection was given by the patient. The patient was placed in a supine position and the right ring was cleaned in prepped with alcohol. She was injected with .5 mL of lidocaine and .25 mL of Kenalog into the ring. The patient tolerated the injection well. The patient has failed conservative measures such as NSAIDS, HEP, and cortisone injections. She is an excellent candidate for Zilretta injections  in the Right knee.  We will contact the patient's insurance company and see them back in the office once we have received approval.

## 2018-08-15 ENCOUNTER — TELEPHONE (OUTPATIENT)
Dept: ADMINISTRATIVE | Age: 63
End: 2018-08-15

## 2018-08-15 DIAGNOSIS — T78.40XA ALLERGIC REACTION, INITIAL ENCOUNTER: Primary | ICD-10-CM

## 2018-08-15 RX ORDER — DIPHENHYDRAMINE HCL 25 MG
25 CAPSULE ORAL EVERY 6 HOURS PRN
Qty: 28 CAPSULE | Refills: 0 | Status: SHIPPED | OUTPATIENT
Start: 2018-08-15 | End: 2018-08-25

## 2018-08-15 NOTE — TELEPHONE ENCOUNTER
Pt called stating she is having a reaction from inj that Dr. Navjot Mane gave her yesterday for her trigger finger. She states after she left the office she started to feel \"funny\" and is itching all over her body, saying \"it feels like something is crawling all over me. \"  She would like to know what she can do, if anything.

## 2018-08-17 ENCOUNTER — TELEPHONE (OUTPATIENT)
Dept: ORTHOPEDIC SURGERY | Age: 63
End: 2018-08-17

## 2018-08-17 ENCOUNTER — TELEPHONE (OUTPATIENT)
Dept: FAMILY MEDICINE CLINIC | Age: 63
End: 2018-08-17

## 2018-08-17 NOTE — TELEPHONE ENCOUNTER
Pt phoned still has that rash on her feet needs RX phoned in [facility] on [address],,, also aspirin needed as well 81 mg

## 2018-08-17 NOTE — TELEPHONE ENCOUNTER
Patient stated benadryl is only making her tired not helping with rash or itching, pt stated she was treated by Dr Tyler Hopkins, and stated she received a injection, which caused rash itching, patient stated Dr Sven Bolanos told her to use benadryl cream which is not helping, tried to call patient for more information , no answer left VM to contact office back on Monday, Dr Tyler Hopkins office or try urgent care if needed

## 2018-08-17 NOTE — TELEPHONE ENCOUNTER
Spoke with patient this week about reaction to cortisone injection that was performed for her trigger finger. Benadryl was e-scripted to her pharmacy but the patient continues to have symptoms. I spoke with the patient this morning and advised her to call her PCP. She has already made a call and is waiting for further advise. Based on her reaction to the small amount of Kenalog, she wishes to try to have Euflexxa approved instead of Negrita Primer.

## 2018-08-29 ENCOUNTER — OFFICE VISIT (OUTPATIENT)
Dept: FAMILY MEDICINE CLINIC | Age: 63
End: 2018-08-29
Payer: MEDICARE

## 2018-08-29 VITALS
OXYGEN SATURATION: 96 % | WEIGHT: 257 LBS | RESPIRATION RATE: 18 BRPM | BODY MASS INDEX: 47.29 KG/M2 | HEART RATE: 92 BPM | HEIGHT: 62 IN | DIASTOLIC BLOOD PRESSURE: 63 MMHG | SYSTOLIC BLOOD PRESSURE: 104 MMHG

## 2018-08-29 DIAGNOSIS — E11.8 TYPE 2 DIABETES MELLITUS WITH COMPLICATION, WITH LONG-TERM CURRENT USE OF INSULIN (HCC): Primary | Chronic | ICD-10-CM

## 2018-08-29 DIAGNOSIS — J44.9 OSA AND COPD OVERLAP SYNDROME (HCC): ICD-10-CM

## 2018-08-29 DIAGNOSIS — G47.33 OSA AND COPD OVERLAP SYNDROME (HCC): ICD-10-CM

## 2018-08-29 DIAGNOSIS — N39.46 MIXED INCONTINENCE URGE AND STRESS: ICD-10-CM

## 2018-08-29 DIAGNOSIS — Z79.4 TYPE 2 DIABETES MELLITUS WITH COMPLICATION, WITH LONG-TERM CURRENT USE OF INSULIN (HCC): Primary | Chronic | ICD-10-CM

## 2018-08-29 DIAGNOSIS — J40 BRONCHITIS: ICD-10-CM

## 2018-08-29 PROCEDURE — G8926 SPIRO NO PERF OR DOC: HCPCS | Performed by: FAMILY MEDICINE

## 2018-08-29 PROCEDURE — 99214 OFFICE O/P EST MOD 30 MIN: CPT | Performed by: FAMILY MEDICINE

## 2018-08-29 PROCEDURE — 2022F DILAT RTA XM EVC RTNOPTHY: CPT | Performed by: FAMILY MEDICINE

## 2018-08-29 PROCEDURE — 1111F DSCHRG MED/CURRENT MED MERGE: CPT | Performed by: FAMILY MEDICINE

## 2018-08-29 PROCEDURE — G8598 ASA/ANTIPLAT THER USED: HCPCS | Performed by: FAMILY MEDICINE

## 2018-08-29 PROCEDURE — 3017F COLORECTAL CA SCREEN DOC REV: CPT | Performed by: FAMILY MEDICINE

## 2018-08-29 PROCEDURE — G8427 DOCREV CUR MEDS BY ELIG CLIN: HCPCS | Performed by: FAMILY MEDICINE

## 2018-08-29 PROCEDURE — G8417 CALC BMI ABV UP PARAM F/U: HCPCS | Performed by: FAMILY MEDICINE

## 2018-08-29 PROCEDURE — 4004F PT TOBACCO SCREEN RCVD TLK: CPT | Performed by: FAMILY MEDICINE

## 2018-08-29 PROCEDURE — 3044F HG A1C LEVEL LT 7.0%: CPT | Performed by: FAMILY MEDICINE

## 2018-08-29 PROCEDURE — 3023F SPIROM DOC REV: CPT | Performed by: FAMILY MEDICINE

## 2018-08-29 RX ORDER — ERGOCALCIFEROL 1.25 MG/1
50000 CAPSULE ORAL WEEKLY
Qty: 12 CAPSULE | Refills: 0 | Status: SHIPPED | OUTPATIENT
Start: 2018-08-29 | End: 2019-04-26

## 2018-08-29 RX ORDER — CYCLOBENZAPRINE HCL 5 MG
5 TABLET ORAL 3 TIMES DAILY PRN
Qty: 30 TABLET | Refills: 0 | Status: SHIPPED | OUTPATIENT
Start: 2018-08-29 | End: 2018-09-08

## 2018-08-29 RX ORDER — OXYBUTYNIN CHLORIDE 10 MG/1
10 TABLET, EXTENDED RELEASE ORAL DAILY
Qty: 30 TABLET | Refills: 3 | Status: SHIPPED | OUTPATIENT
Start: 2018-08-29 | End: 2018-09-13

## 2018-08-29 RX ORDER — DOXYCYCLINE HYCLATE 100 MG
100 TABLET ORAL 2 TIMES DAILY WITH MEALS
Qty: 20 TABLET | Refills: 0 | Status: SHIPPED | OUTPATIENT
Start: 2018-08-29 | End: 2018-09-08

## 2018-08-29 ASSESSMENT — ENCOUNTER SYMPTOMS
VOMITING: 0
CONSTIPATION: 0
RHINORRHEA: 0
ABDOMINAL PAIN: 0
SHORTNESS OF BREATH: 0
BACK PAIN: 1
NAUSEA: 0
TROUBLE SWALLOWING: 0
COUGH: 1
SORE THROAT: 0
DIARRHEA: 0
SINUS PAIN: 0

## 2018-08-29 NOTE — PROGRESS NOTES
Corinna Darden  : 1955    Chief Complaint:     Chief Complaint   Patient presents with    Cough    Congestion     mucinex not helping        HPI  Corinna Darden 60 y.o. presents for   Chief Complaint   Patient presents with    Cough    Congestion     mucinex not helping      She presents today for Main complaint of cough with congestion. She has been taking Mucinex with little relief. She does have an appointment with her pulmonologist in the next few weeks. She denies any shortness of breath. She has been taking her medications and inhalers as prescribed. She denies any fevers or chills. Though she does state she has hot and cold often. She has been taking her diabetes medication as prescribed and does not have a log of her sugars. Continues to complain of incontinence and did make an appointment with gynecology to further evaluate this. She does wish for oxybutynin today. All questions were answered to patients satisfaction.     Past Medical History:   Diagnosis Date    Asthma     C. difficile diarrhea     CAD (coronary artery disease)     Cellulitis and abscess of trunk 2015    Chronic back pain     Chronic kidney disease     Chronic systolic congestive heart failure ([address] 10/4/2013    Chronic venous insufficiency 10/11/2017    COPD (chronic obstructive pulmonary disease) (HCC)     Depression     Diabetes mellitus ([address]     Diabetic neuropathy ([address]     Diverticulosis     Fatty liver 2016    per [place]    Glaucoma, open angle 2016    Mild-OU    Hepatic encephalopathy ([address] 2016    resolved    Hiatal hernia     History of blood transfusion     Hyperlipidemia     Hyperplastic colon polyp     Hypertension     Incontinence     Liver mass     Morbid obesity ([address]     Movement disorder     Myocardial infarction [facility])     Osteoarthritis, generalized     Pain in both lower legs 10/11/2017    Peripheral vascular disease (HCC)     Pneumonia 1/26/2014    Pulmonary edema     resolved    PVD (peripheral vascular disease) with claudication ([address] 8/30/2017    Sleep apnea     uses BI PAP    Tobacco abuse     Tobacco abuse 10/11/2017    Tubular adenoma polyp of rectum     Urinary tract infection due to ESBL Klebsiella 03/08/2017    Ventral hernia        Past Surgical History:   Procedure Laterality Date    BREAST SURGERY  2000    bilateral reduction    BRONCHIAL BRUSH BIOPSY  1/25/2013    Dr Bib Whitlock  04/20/2015    Dr. Tru Andrade  12/2011     DR. Jennifer Francisco,  follows Dr Zackary Gordon  11/15/2013    Dr Ruth Rausch COLONOSCOPY  12/23/2016    Dr Katharine Gonzalez adenoma & hyperplastic polyps, melanosis coli (repeat one year 12/2017)    CORONARY ARTERY BYPASS GRAFT      ECHO COMPLETE  10/9/2013         ENDOSCOPY, COLON, DIAGNOSTIC  04/18/2017    GALLBLADDER SURGERY      gallstones removed, [facility] 8/2017    HYSTERECTOMY      JOINT REPLACEMENT  2011    LEFT KNEE    KNEE SURGERY      left knee replacement    LAYER WOUND CLOSURE Left [id number]    LIVER BIOPSY  1/8/2016     E's    POLYSOMNOGRAPHY  1/2016    [facility]    UPPER GASTROINTESTINAL ENDOSCOPY  12/20/12    UPPER GASTROINTESTINAL ENDOSCOPY  12/23/2016    Dr Ruth Rausch UPPER GASTROINTESTINAL ENDOSCOPY  02/08/2017       Social History     Social History    Marital status:       Spouse name: N/A    Number of children: 2    Years of education: N/A     Occupational History    disability      Social History Main Topics    Smoking status: Current Every Day Smoker     Packs/day: 0.25     Years: 40.00     Types: Cigarettes     Start date: 8/10/1974    Smokeless tobacco: Never Used      Comment: 1 pack every 3 days (6-7 cig)    Alcohol use No    Drug use: Yes     Types: Marijuana      Comment: \"every 4th or 5th day out of the week\"    Sexual activity: Not Asked     Other Topics Concern    None Social History Narrative    Pt lives with brother in her house-pt has never driven a car and depends on transportation       Family History   Problem Relation Age of Onset    Cancer Mother     Asthma Father           Current Outpatient Prescriptions   Medication Sig Dispense Refill    miconazole (MICOTIN) 2 % cream Apply topically 2 times daily. 1 Tube 1    cyclobenzaprine (FLEXERIL) 5 MG tablet Take 1 tablet by mouth 3 times daily as needed for Muscle spasms 30 tablet 0    doxycycline hyclate (VIBRA-TABS) 100 MG tablet Take 1 tablet by mouth 2 times daily (with meals) for 10 days 20 tablet 0    oxybutynin (DITROPAN XL) 10 MG extended release tablet Take 1 tablet by mouth daily 30 tablet 3    vitamin D (ERGOCALCIFEROL) 20124 units CAPS capsule Take 1 capsule by mouth once a week On Sundays 12 capsule 0    DULoxetine (CYMBALTA) 60 MG extended release capsule TAKE ONE CAPSULE BY MOUTH TWICE A  capsule 0    Insulin Degludec (TRESIBA FLEXTOUCH) 200 UNIT/ML SOPN Inject 70 Units into the skin 2 times daily 15 pen 2    bisacodyl (DULCOLAX) 10 MG suppository Place 10 mg rectally daily      docusate sodium (COLACE) 100 MG capsule Take 100 mg by mouth 2 times daily      colestipol (COLESTID) 1 g tablet Take 1 g by mouth 3 times daily      darifenacin (ENABLEX) 7.5 MG extended release tablet Take 7.5 mg by mouth daily      dicyclomine (BENTYL) 20 MG tablet Take 10 mg by mouth 2 times daily      diphenoxylate-atropine (LOMOTIL) 2.5-0.025 MG per tablet Take 1 tablet by mouth 4 times daily as needed for Diarrhea. [place] Omega-3 Fatty Acids (FISH OIL OMEGA-3 PO) Take by mouth      lidocaine (XYLOCAINE) 5 % ointment Apply topically as needed for Pain Apply topically as needed.  famotidine (PEPCID) 40 MG tablet Take 40 mg by mouth daily      traMADol (ULTRAM) 50 MG tablet Take 50 mg by mouth every 6 hours as needed for Pain. Laura Bone       ursodiol (ACTIGALL) 300 MG capsule Take 300 mg by mouth 2 times daily  ondansetron (ZOFRAN) 4 MG tablet Take 4 mg by mouth every 8 hours as needed for Nausea or Vomiting      metoprolol succinate (TOPROL XL) 25 MG extended release tablet Take 1 tablet by mouth daily 90 tablet 3    sacubitril-valsartan (ENTRESTO) 24-26 MG per tablet Take 1 tablet by mouth 2 times daily 60 tablet 3    simvastatin (ZOCOR) 20 MG tablet Take 1 tablet by mouth nightly 30 tablet 11    isosorbide mononitrate (IMDUR) 60 MG extended release tablet Take 1 tablet by mouth daily 90 tablet 3    bumetanide (BUMEX) 1 MG tablet Take 1 tablet by mouth daily 30 tablet 5    mometasone-formoterol (DULERA) 200-5 MCG/ACT inhaler Inhale 2 puffs into the lungs every 12 hours      Multiple Vitamins-Minerals (THERAPEUTIC MULTIVITAMIN-MINERALS) tablet Take 1 tablet by mouth daily      insulin aspart (NOVOLOG FLEXPEN) 100 UNIT/ML injection pen Inject 35 Units into the skin 3 times daily (before meals)      albuterol (PROVENTIL) (2.5 MG/3ML) 0.083% nebulizer solution Take 2.5 mg by nebulization every 6 hours as needed for Wheezing      acetaminophen (TYLENOL) 500 MG tablet Take 1,000 mg by mouth every 6 hours as needed for Pain      cetirizine (ZYRTEC) 10 MG tablet Take 10 mg by mouth daily      ACCU-CHEK FASTCLIX LANCETS MISC 5 times daily      traZODone (DESYREL) 100 MG tablet Take 1 tablet by mouth nightly 30 tablet 2    gabapentin (NEURONTIN) 300 MG capsule Take 2 capsules by mouth 3 times daily for 30 days. . 180 capsule 2    Insulin Pen Needle (B-D UF III MINI PEN NEEDLES) 31G X 5 MM MISC 1 each by Does not apply route daily 100 each 3    blood glucose test strips (ACCU-CHEK COMPACT TEST DRUM) strip 1 each by In Vitro route daily As needed.  100 each 3    SOFT TOUCH LANCETS MISC Test 3 times daily 100 each 3    pantoprazole (PROTONIX) 40 MG tablet TAKE 1 TABLET BY MOUTH TWICE A DAY  2    hydrALAZINE (APRESOLINE) 100 MG tablet Take 1 tablet by mouth 3 times daily 90 tablet 0    montelukast (SINGULAIR) 10 MG tablet Take 10 mg by mouth nightly       aspirin 81 MG chewable tablet TAKE 1 TABLET BY MOUTH DAILY  0    fluticasone (FLONASE) 50 MCG/ACT nasal spray 1 spray by Nasal route 2 times daily 1 Bottle 3    nitroGLYCERIN (NITROSTAT) 0.4 MG SL tablet up to max of 3 total doses. If no relief after 1 dose, call 911. 25 tablet 3    BiPAP Machine MISC by Does not apply route nightly 27/23       No current facility-administered medications for this visit. Allergies   Allergen Reactions    Latex Hives    Bee Venom Anaphylaxis    Dilaudid [Hydromorphone Hcl] Itching    Dye [Iodides] Hives and Shortness Of Breath    Percocet [Oxycodone-Acetaminophen] Shortness Of Breath and Itching    Keflex [Cephalexin] Itching and Rash    Lasix [Furosemide] Other (See Comments)     Pt states she cramps up and gets headaches    Levaquin [Levofloxacin In D5w] Hives    Lipitor      MUSCLE SPASMS    Lyrica [Pregabalin]      Dream disturbances    Morphine Hives    Naproxen      Unsure of reaction;pt states able to take Aleve without difficulties    Nefazodone Other (See Comments)    Norvasc [Amlodipine] Swelling    Oxycodone-Acetaminophen Swelling    Shellfish-Derived Products     Trazodone And Nefazodone        Health Maintenance Due   Topic Date Due    DTaP/Tdap/Td vaccine (1 - Tdap) 12/29/1973    Pneumococcal med risk (1 of 1 - PPSV23) 12/29/1973    Shingles Vaccine (1 of 2 - 2 Dose Series) 12/29/2004    Diabetic retinal exam  05/13/2016    Cervical cancer screen  09/16/2016           REVIEW OF SYSTEMS  Review of Systems   Constitutional: Positive for fatigue. Negative for chills and fever. Uses walker   HENT: Positive for postnasal drip. Negative for congestion, rhinorrhea, sinus pain, sore throat and trouble swallowing. Respiratory: Positive for cough. Negative for shortness of breath. Mucus production   Cardiovascular: Negative for chest pain and leg swelling.    Gastrointestinal: laboratory and radiology results have been personally reviewed by myself    Lab Results   Component Value Date     07/31/2018    K 3.7 07/31/2018    K 3.6 07/29/2018    CL 97 07/31/2018    CO2 27 07/31/2018    BUN 14 07/31/2018    CREATININE 0.9 07/31/2018    PROT 7.2 07/26/2018    LABALBU 3.8 07/26/2018    LABALBU 5.2 12/01/2011    CALCIUM 9.2 07/31/2018    GFRAA >60 07/31/2018    LABGLOM >60 07/31/2018    GLUCOSE 131 07/31/2018    GLUCOSE 181 12/01/2011    AST 20 07/26/2018    ALT 25 07/26/2018    ALKPHOS 87 07/26/2018    BILITOT 0.3 07/26/2018    TSH 1.560 11/07/2017    CHOL 222 06/05/2018    TRIG 84 06/05/2018    HDL 38 06/05/2018    LDLCALC 167 06/05/2018    LABA1C 6.1 07/27/2018        Lab Results   Component Value Date    CHOL 222 (H) 06/05/2018    CHOL 159 12/01/2016    CHOL 186 04/07/2016     Lab Results   Component Value Date    TRIG 84 06/05/2018    TRIG 215 (H) 12/01/2016    TRIG 298 (H) 04/07/2016     Lab Results   Component Value Date    HDL 38 06/05/2018    HDL 27 12/01/2016    HDL 26 04/07/2016     Lab Results   Component Value Date    LDLCALC 167 (H) 06/05/2018    LDLCALC 89 12/01/2016    LDLCALC 100 (H) 04/07/2016       Lab Results   Component Value Date    LABA1C 6.1 (H) 07/27/2018    LABA1C 5.9 05/09/2018    LABA1C 6.8 (H) 02/06/2017     Lab Results   Component Value Date    LABMICR 82.0 (H) 12/01/2016    LDLCALC 167 (H) 06/05/2018    CREATININE 0.9 07/31/2018       ASSESSMENT/PLAN:     Diagnosis Orders   1. Type 2 diabetes mellitus with complication, with long-term current use of insulin ([facility]     2. DAYAN and COPD overlap syndrome ([facility]     3. Bronchitis     4. Mixed incontinence urge and stress       Discussed that she does need a follow-up with her pulmonologist as she may need to adjust her inhalers. In the meantime we'll treat with doxycycline. She should continue to take the Mucinex on a daily basis. Side effects of medication reviewed patient.  As she does not have a log of her sugars cannot adjust insulin at this time. She does have an appointment with gynecology to evaluate her incontinence. Oxybutynin was sent in today for her to use as needed. She cannot tolerate incontinence supplies    Problem list reviewed and simplified/updated  HM reviewed today and counseled as appropriate    Call or go to ED immediately if symptoms worsen or persist.  Future Appointments  Date Time Provider Melanie Pateli   9/6/2018 2:15 PM Leandro Orozco MD [facility]   9/13/2018 1:45 PM DO Jc Dudleytwjuarez [facility]   9/28/2018 11:00 AM Heather Cartwright,  [facility]   2/11/2019 1:00 PM Bell Roa MD [address] [facility]     Or sooner if necessary. Educational materials and/or home exercises printed for patient's review and were included in patient instructions on his/her After Visit Summary and given to patient at the end of visit.       Counseled regarding above diagnosis, including possible risks and complications,  especially if left uncontrolled.     Counseled regarding the possible side effects, risks, benefits and alternatives to treatment; patient and/or guardian verbalizes understanding, agrees, feels comfortable with and wishes to proceed with above treatment plan.     Advised patient to call with any new medication issues, and read all Rx info from pharmacy to assure aware of all possible risks and side effects of medication before taking.     Reviewed age and gender appropriate health screening exams and vaccinations. Advised patient regarding importance of keeping up with recommended health maintenance and to schedule as soon as possible if overdue, as this is important in assessing for undiagnosed pathology, especially cancer, as well as protecting against potentially harmful/life threatening disease.          Patient and/or guardian verbalizes understanding and agrees with above counseling, assessment and plan.     All questions answered.     Shey Jones,

## 2018-09-04 ENCOUNTER — TELEPHONE (OUTPATIENT)
Dept: FAMILY MEDICINE CLINIC | Age: 63
End: 2018-09-04

## 2018-09-04 NOTE — TELEPHONE ENCOUNTER
Patient calling and states that she told you of a pain relief commercial that she saw on TV. She is calling to let you know the the phone # of it.  She said it is called pain relief [facility], [phone]

## 2018-09-10 ENCOUNTER — TELEPHONE (OUTPATIENT)
Dept: ORTHOPEDIC SURGERY | Age: 63
End: 2018-09-10

## 2018-09-10 NOTE — TELEPHONE ENCOUNTER
Spoke to patient on the phone and informed her that her Right Knee Euflexxa is approved. Patient states it is not a good time to schedule, and she will call back to schedule when she can.

## 2018-09-13 ENCOUNTER — OFFICE VISIT (OUTPATIENT)
Dept: FAMILY MEDICINE CLINIC | Age: 63
End: 2018-09-13
Payer: MEDICARE

## 2018-09-13 VITALS
WEIGHT: 257 LBS | BODY MASS INDEX: 47.29 KG/M2 | DIASTOLIC BLOOD PRESSURE: 69 MMHG | RESPIRATION RATE: 18 BRPM | SYSTOLIC BLOOD PRESSURE: 129 MMHG | HEIGHT: 62 IN | OXYGEN SATURATION: 95 % | HEART RATE: 83 BPM

## 2018-09-13 DIAGNOSIS — I10 ESSENTIAL HYPERTENSION: Chronic | ICD-10-CM

## 2018-09-13 DIAGNOSIS — N39.46 MIXED INCONTINENCE URGE AND STRESS: ICD-10-CM

## 2018-09-13 DIAGNOSIS — Z79.4 TYPE 2 DIABETES MELLITUS WITH COMPLICATION, WITH LONG-TERM CURRENT USE OF INSULIN (HCC): Chronic | ICD-10-CM

## 2018-09-13 DIAGNOSIS — E11.8 TYPE 2 DIABETES MELLITUS WITH COMPLICATION, WITH LONG-TERM CURRENT USE OF INSULIN (HCC): Chronic | ICD-10-CM

## 2018-09-13 DIAGNOSIS — M54.6 CHRONIC THORACIC BACK PAIN, UNSPECIFIED BACK PAIN LATERALITY: Chronic | ICD-10-CM

## 2018-09-13 DIAGNOSIS — I87.2 CHRONIC VENOUS INSUFFICIENCY: ICD-10-CM

## 2018-09-13 DIAGNOSIS — E66.01 MORBID OBESITY DUE TO EXCESS CALORIES (HCC): ICD-10-CM

## 2018-09-13 DIAGNOSIS — G89.29 CHRONIC THORACIC BACK PAIN, UNSPECIFIED BACK PAIN LATERALITY: Chronic | ICD-10-CM

## 2018-09-13 DIAGNOSIS — G47.33 OSA AND COPD OVERLAP SYNDROME (HCC): Primary | ICD-10-CM

## 2018-09-13 DIAGNOSIS — J44.9 OSA AND COPD OVERLAP SYNDROME (HCC): Primary | ICD-10-CM

## 2018-09-13 LAB — HBA1C MFR BLD: 6 %

## 2018-09-13 PROCEDURE — 83036 HEMOGLOBIN GLYCOSYLATED A1C: CPT | Performed by: FAMILY MEDICINE

## 2018-09-13 PROCEDURE — 99214 OFFICE O/P EST MOD 30 MIN: CPT | Performed by: FAMILY MEDICINE

## 2018-09-13 PROCEDURE — G8427 DOCREV CUR MEDS BY ELIG CLIN: HCPCS | Performed by: FAMILY MEDICINE

## 2018-09-13 PROCEDURE — 3017F COLORECTAL CA SCREEN DOC REV: CPT | Performed by: FAMILY MEDICINE

## 2018-09-13 PROCEDURE — G8598 ASA/ANTIPLAT THER USED: HCPCS | Performed by: FAMILY MEDICINE

## 2018-09-13 PROCEDURE — G8417 CALC BMI ABV UP PARAM F/U: HCPCS | Performed by: FAMILY MEDICINE

## 2018-09-13 PROCEDURE — 2022F DILAT RTA XM EVC RTNOPTHY: CPT | Performed by: FAMILY MEDICINE

## 2018-09-13 PROCEDURE — 3023F SPIROM DOC REV: CPT | Performed by: FAMILY MEDICINE

## 2018-09-13 PROCEDURE — G8926 SPIRO NO PERF OR DOC: HCPCS | Performed by: FAMILY MEDICINE

## 2018-09-13 PROCEDURE — 3044F HG A1C LEVEL LT 7.0%: CPT | Performed by: FAMILY MEDICINE

## 2018-09-13 PROCEDURE — 4004F PT TOBACCO SCREEN RCVD TLK: CPT | Performed by: FAMILY MEDICINE

## 2018-09-13 ASSESSMENT — ENCOUNTER SYMPTOMS
VOMITING: 0
CONSTIPATION: 0
COUGH: 0
RHINORRHEA: 0
SHORTNESS OF BREATH: 0
EYE PAIN: 0
TROUBLE SWALLOWING: 0
BACK PAIN: 0
SINUS PRESSURE: 0
SINUS PAIN: 0
ABDOMINAL PAIN: 0
DIARRHEA: 0
SORE THROAT: 0
NAUSEA: 0

## 2018-09-13 NOTE — PROGRESS NOTES
back pain     Chronic kidney disease     Chronic systolic congestive heart failure ([address] 10/4/2013    Chronic venous insufficiency 10/11/2017    COPD (chronic obstructive pulmonary disease) (HCC)     Depression     Diabetes mellitus ([address]     Diabetic neuropathy ([address]     Diverticulosis     Fatty liver 1/8/2016    per US    Glaucoma, open angle 2/1/2016    Mild-OU    Hepatic encephalopathy ([address] 02/07/2016    resolved    Hiatal hernia     History of blood transfusion     Hyperlipidemia     Hyperplastic colon polyp     Hypertension     Incontinence     Liver mass     Morbid obesity ([address]     Movement disorder     Myocardial infarction ([address] 2011    Osteoarthritis, generalized     Pain in both lower legs 10/11/2017    Peripheral vascular disease ([address]     Pneumonia 1/26/2014    Pulmonary edema     resolved    PVD (peripheral vascular disease) with claudication ([address] 8/30/2017    Sleep apnea     uses BI PAP    Tobacco abuse     Tobacco abuse 10/11/2017    Tubular adenoma polyp of rectum     Urinary tract infection due to ESBL Klebsiella 03/08/2017    Ventral hernia        Past Surgical History:   Procedure Laterality Date    BREAST SURGERY  2000    bilateral reduction    BRONCHIAL BRUSH BIOPSY  1/25/2013    Dr Alissa Dasilva  04/20/2015    Dr. Romayne Niya  12/2011     DR. Fernanda Suarez,  follows Dr Holly Lechuga  11/15/2013    Dr Eliza Crooks COLONOSCOPY  12/23/2016    Dr Chica Olson adenoma & hyperplastic polyps, melanosis coli (repeat one year 12/2017)    CORONARY ARTERY BYPASS GRAFT      ECHO COMPLETE  10/9/2013         ENDOSCOPY, COLON, DIAGNOSTIC  04/18/2017    GALLBLADDER SURGERY      gallstones removed, [facility] 8/2017    HYSTERECTOMY      JOINT REPLACEMENT  2011    LEFT KNEE    KNEE SURGERY      left knee replacement    LAYER WOUND CLOSURE Left [id number]    LIVER BIOPSY  1/8/2016    [facility]    POLYSOMNOGRAPHY  1/2016    [facility]    UPPER GASTROINTESTINAL ENDOSCOPY  12/20/12    UPPER GASTROINTESTINAL ENDOSCOPY  12/23/2016    Dr Ed Oneal   [facility] ENDOSCOPY  02/08/2017       Social History     Social History    Marital status:      Spouse name: N/A    Number of children: 2    Years of education: N/A     Occupational History    disability      Social History Main Topics    Smoking status: Current Every Day Smoker     Packs/day: 0.25     Years: 40.00     Types: Cigarettes     Start date: 8/10/1974    Smokeless tobacco: Never Used      Comment: 1 pack every 3 days (6-7 cig)    Alcohol use No    Drug use: Yes     Types: Marijuana      Comment: \"every 4th or 5th day out of the week\"    Sexual activity: Not Asked     Other Topics Concern    None     Social History Narrative    Pt lives with brother in her house-pt has never driven a car and depends on transportation       Family History   Problem Relation Age of Onset    Cancer Mother     Asthma Father           Current Outpatient Prescriptions   Medication Sig Dispense Refill    Insulin Pen Needle (B-D UF III MINI PEN NEEDLES) 31G X 5 MM MISC 1 each by Does not apply route daily 100 each 3    Mirabegron ER (MYRBETRIQ) 25 MG TB24 Take 1 tablet by mouth daily 30 tablet 2    vitamin D (ERGOCALCIFEROL) 87752 units CAPS capsule Take 1 capsule by mouth once a week On Sundays 12 capsule 0    miconazole (MICOTIN) 2 % cream Apply topically 2 times daily.  1 Tube 1    DULoxetine (CYMBALTA) 60 MG extended release capsule TAKE ONE CAPSULE BY MOUTH TWICE A  capsule 0    Insulin Degludec (TRESIBA FLEXTOUCH) 200 UNIT/ML SOPN Inject 70 Units into the skin 2 times daily 15 pen 2    bisacodyl (DULCOLAX) 10 MG suppository Place 10 mg rectally daily      docusate sodium (COLACE) 100 MG capsule Take 100 mg by mouth 2 times daily      colestipol (COLESTID) 1 g tablet Take 1 g by mouth 3 times daily      darifenacin (ENABLEX) MG tablet Take 1 tablet by mouth nightly 30 tablet 2    gabapentin (NEURONTIN) 300 MG capsule Take 2 capsules by mouth 3 times daily for 30 days. . 180 capsule 2    blood glucose test strips (ACCU-CHEK COMPACT TEST DRUM) strip 1 each by In Vitro route daily As needed. 100 each 3    SOFT TOUCH LANCETS MISC Test 3 times daily 100 each 3    pantoprazole (PROTONIX) 40 MG tablet TAKE 1 TABLET BY MOUTH TWICE A DAY  2    hydrALAZINE (APRESOLINE) 100 MG tablet Take 1 tablet by mouth 3 times daily 90 tablet 0    montelukast (SINGULAIR) 10 MG tablet Take 10 mg by mouth nightly       aspirin 81 MG chewable tablet TAKE 1 TABLET BY MOUTH DAILY  0    fluticasone (FLONASE) 50 MCG/ACT nasal spray 1 spray by Nasal route 2 times daily 1 Bottle 3    nitroGLYCERIN (NITROSTAT) 0.4 MG SL tablet up to max of 3 total doses. If no relief after 1 dose, call 911. 25 tablet 3    BiPAP Machine MISC by Does not apply route nightly 27/23       No current facility-administered medications for this visit.         Allergies   Allergen Reactions    Latex Hives    Bee Venom Anaphylaxis    Dilaudid [Hydromorphone Hcl] Itching    Dye [Iodides] Hives and Shortness Of Breath    Percocet [Oxycodone-Acetaminophen] Shortness Of Breath and Itching    Keflex [Cephalexin] Itching and Rash    Lasix [Furosemide] Other (See Comments)     Pt states she cramps up and gets headaches    Levaquin [Levofloxacin In D5w] Hives    Lipitor      MUSCLE SPASMS    Lyrica [Pregabalin]      Dream disturbances    Morphine Hives    Naproxen      Unsure of reaction;pt states able to take Aleve without difficulties    Nefazodone Other (See Comments)    Norvasc [Amlodipine] Swelling    Oxycodone-Acetaminophen Swelling    Shellfish-Derived Products     Trazodone And Nefazodone        Health Maintenance Due   Topic Date Due    DTaP/Tdap/Td vaccine (1 - Tdap) 12/29/1973    Pneumococcal med risk (1 of 1 - PPSV23) 12/29/1973    Shingles Vaccine (1 of 2 - 2 Dose Cardiovascular: Normal rate, regular rhythm and normal heart sounds. No murmur heard. No edema, pvd changes noted on LE   Pulmonary/Chest: Effort normal. No respiratory distress. She has no wheezes. She has no rales. Rhonchi noted bilateral lower lobes   Abdominal: Soft. Bowel sounds are normal. There is no tenderness. Musculoskeletal: Tenderness: lumbar, cervical spinal musculatrue. Uses walker   Lymphadenopathy:     She has no cervical adenopathy. Neurological: She is alert and oriented to person, place, and time. Coordination normal.   Skin: Skin is warm and dry. No rash noted. Psychiatric: She has a normal mood and affect. Her behavior is normal.   Nursing note and vitals reviewed. Laboratory:   All laboratory and radiology results have been personally reviewed by myself    Lab Results   Component Value Date     07/31/2018    K 3.7 07/31/2018    K 3.6 07/29/2018    CL 97 07/31/2018    CO2 27 07/31/2018    BUN 14 07/31/2018    CREATININE 0.9 07/31/2018    PROT 7.2 07/26/2018    LABALBU 3.8 07/26/2018    LABALBU 5.2 12/01/2011    CALCIUM 9.2 07/31/2018    GFRAA >60 07/31/2018    LABGLOM >60 07/31/2018    GLUCOSE 131 07/31/2018    GLUCOSE 181 12/01/2011    AST 20 07/26/2018    ALT 25 07/26/2018    ALKPHOS 87 07/26/2018    BILITOT 0.3 07/26/2018    TSH 1.560 11/07/2017    CHOL 222 06/05/2018    TRIG 84 06/05/2018    HDL 38 06/05/2018    LDLCALC 167 06/05/2018    LABA1C 6.0 09/13/2018        Lab Results   Component Value Date    CHOL 222 (H) 06/05/2018    CHOL 159 12/01/2016    CHOL 186 04/07/2016     Lab Results   Component Value Date    TRIG 84 06/05/2018    TRIG 215 (H) 12/01/2016    TRIG 298 (H) 04/07/2016     Lab Results   Component Value Date    HDL 38 06/05/2018    HDL 27 12/01/2016    HDL 26 04/07/2016     Lab Results   Component Value Date    LDLCALC 167 (H) 06/05/2018    LDLCALC 89 12/01/2016    LDLCALC 100 (H) 04/07/2016       Lab Results   Component Value Date    LABA1C 6.0 09/13/2018    LABA1C 6.1 (H) 07/27/2018    LABA1C 5.9 05/09/2018     Lab Results   Component Value Date    LABMICR 82.0 (H) 12/01/2016    LDLCALC 167 (H) 06/05/2018    CREATININE 0.9 07/31/2018       ASSESSMENT/PLAN:     Diagnosis Orders   1. DAYAN and COPD overlap syndrome ([facility]  She is currently on Mucinex and multiple inhalers. I did discuss that the mucus production may not resolve and is likely a chronic issue. She is to follow-up with pulmonology which I have discussed with her multiple times now. Patient states she will make an appointment with her lung doctor. 2. Type 2 diabetes mellitus with complication, with long-term current use of insulin (HCC)  We will decrease tresiba to 70 units twice a day as she has been taking 80 units twice a day. She'll continue to check her sugars and call me in 1 week with her readings so we can adjust further. A1c is very well controlled. POCT glycosylated hemoglobin (Hb A1C)     3. Morbid obesity due to excess calories ([facility]  Discussed diet and exercise in detail today and he/she understands the importance of losing weight for better health. 4. Chronic thoracic back pain, unspecified back pain laterality  Is going to a pain management program has not yet had this appointment      5. Chronic venous insufficiency  Discussed that likely the rash she is seeing is from PVD changes. No treatment necessary at this time. Continue to follow with a podiatrist      6. Essential hypertension  BP under good control today continue current therapy      7. Mixed incontinence urge and stress  Will switch to myrbetriq as to ditropan did not work well for her. Side effects medication review to patient.         Problem list reviewed and simplified/updated  HM reviewed today and counseled as appropriate    Call or go to ED immediately if symptoms worsen or persist.  Future Appointments  Date Time Provider Melanie Sellers   9/28/2018 11:00 AM Jose Hernandez,  [facility] 11/14/2018 3:30 PM Karen Hayes DO [facility]   2/11/2019 1:00 PM Giorgi Jameson MD [facility]     Or sooner if necessary. Educational materials and/or home exercises printed for patient's review and were included in patient instructions on his/her After Visit Summary and given to patient at the end of visit.       Counseled regarding above diagnosis, including possible risks and complications,  especially if left uncontrolled.     Counseled regarding the possible side effects, risks, benefits and alternatives to treatment; patient and/or guardian verbalizes understanding, agrees, feels comfortable with and wishes to proceed with above treatment plan.     Advised patient to call with any new medication issues, and read all Rx info from pharmacy to assure aware of all possible risks and side effects of medication before taking.     Reviewed age and gender appropriate health screening exams and vaccinations. Advised patient regarding importance of keeping up with recommended health maintenance and to schedule as soon as possible if overdue, as this is important in assessing for undiagnosed pathology, especially cancer, as well as protecting against potentially harmful/life threatening disease.          Patient and/or guardian verbalizes understanding and agrees with above counseling, assessment and plan.     All questions answered. Luana Mariscal, [address], DO  9/13/18    NOTE: This report was transcribed using voice recognition software.  Every effort was made to ensure accuracy; however, inadvertent computerized transcription errors may be present

## 2018-09-17 ENCOUNTER — TELEPHONE (OUTPATIENT)
Dept: FAMILY MEDICINE CLINIC | Age: 63
End: 2018-09-17

## 2018-09-17 RX ORDER — TOBRAMYCIN 3 MG/ML
1 SOLUTION/ DROPS OPHTHALMIC EVERY 4 HOURS
Qty: 1 BOTTLE | Refills: 0 | Status: SHIPPED | OUTPATIENT
Start: 2018-09-17 | End: 2018-09-24

## 2018-09-18 ENCOUNTER — TELEPHONE (OUTPATIENT)
Dept: FAMILY MEDICINE CLINIC | Age: 63
End: 2018-09-18

## 2018-09-18 DIAGNOSIS — R32 URINARY INCONTINENCE, UNSPECIFIED TYPE: Primary | ICD-10-CM

## 2018-09-18 RX ORDER — ISOSORBIDE MONONITRATE 60 MG/1
60 TABLET, EXTENDED RELEASE ORAL DAILY
Qty: 90 TABLET | Refills: 3 | Status: SHIPPED | OUTPATIENT
Start: 2018-09-18 | End: 2019-02-11 | Stop reason: SDUPTHER

## 2018-09-18 RX ORDER — METOPROLOL SUCCINATE 25 MG/1
25 TABLET, EXTENDED RELEASE ORAL DAILY
Qty: 90 TABLET | Refills: 3 | Status: SHIPPED | OUTPATIENT
Start: 2018-09-18 | End: 2019-02-11 | Stop reason: SDUPTHER

## 2018-09-18 RX ORDER — HYDRALAZINE HYDROCHLORIDE 100 MG/1
100 TABLET, FILM COATED ORAL 3 TIMES DAILY
Qty: 270 TABLET | Refills: 3 | Status: SHIPPED | OUTPATIENT
Start: 2018-09-18 | End: 2019-02-11 | Stop reason: SDUPTHER

## 2018-09-18 RX ORDER — SIMVASTATIN 20 MG
20 TABLET ORAL NIGHTLY
Qty: 90 TABLET | Refills: 3 | Status: SHIPPED | OUTPATIENT
Start: 2018-09-18 | End: 2019-02-11 | Stop reason: SDUPTHER

## 2018-09-18 RX ORDER — NITROGLYCERIN 0.4 MG/1
TABLET SUBLINGUAL
Qty: 25 TABLET | Refills: 3 | Status: SHIPPED | OUTPATIENT
Start: 2018-09-18 | End: 2019-06-26 | Stop reason: SDUPTHER

## 2018-09-18 NOTE — TELEPHONE ENCOUNTER
Pt left vm stating the new water pill that was prescribed is causing her to wet her bed and have accidents during the day also. Pt needs to know what to do now she has already stopped taking the pill.

## 2018-09-18 NOTE — TELEPHONE ENCOUNTER
Does she mean the medication for incontinence the myrbetriq? If so then we are out of options and will need to send to urology for further evaluation.

## 2018-09-25 ENCOUNTER — TELEPHONE (OUTPATIENT)
Dept: FAMILY MEDICINE CLINIC | Age: 63
End: 2018-09-25

## 2018-09-25 NOTE — TELEPHONE ENCOUNTER
Patient called office requesting medication for cough, congestion, tight chest, and chest soreness due due coughing, called patient back for more information no answer, patient needs to schedule appt to be seen

## 2018-09-27 ENCOUNTER — TELEPHONE (OUTPATIENT)
Dept: FAMILY MEDICINE CLINIC | Age: 63
End: 2018-09-27

## 2018-09-27 RX ORDER — GABAPENTIN 300 MG/1
600 CAPSULE ORAL 3 TIMES DAILY
Qty: 180 CAPSULE | Refills: 2 | Status: ON HOLD | OUTPATIENT
Start: 2018-09-27 | End: 2018-11-09 | Stop reason: HOSPADM

## 2018-09-27 RX ORDER — TRAZODONE HYDROCHLORIDE 100 MG/1
100 TABLET ORAL NIGHTLY
Qty: 30 TABLET | Refills: 2 | Status: SHIPPED | OUTPATIENT
Start: 2018-09-27 | End: 2018-10-05

## 2018-09-27 NOTE — TELEPHONE ENCOUNTER
Clemente Camp from [facility] ([phone]) left vm stating pt possibly needs anti-depressant.  Called pt scheduled appt

## 2018-10-05 ENCOUNTER — OFFICE VISIT (OUTPATIENT)
Dept: FAMILY MEDICINE CLINIC | Age: 63
End: 2018-10-05
Payer: MEDICARE

## 2018-10-05 VITALS
BODY MASS INDEX: 47.55 KG/M2 | SYSTOLIC BLOOD PRESSURE: 130 MMHG | DIASTOLIC BLOOD PRESSURE: 84 MMHG | RESPIRATION RATE: 20 BRPM | HEART RATE: 81 BPM | WEIGHT: 260 LBS | OXYGEN SATURATION: 92 %

## 2018-10-05 DIAGNOSIS — E66.01 MORBID OBESITY DUE TO EXCESS CALORIES (HCC): ICD-10-CM

## 2018-10-05 DIAGNOSIS — N39.46 MIXED INCONTINENCE URGE AND STRESS: ICD-10-CM

## 2018-10-05 DIAGNOSIS — F33.0 MAJOR DEPRESSIVE DISORDER, RECURRENT EPISODE, MILD (HCC): ICD-10-CM

## 2018-10-05 DIAGNOSIS — G47.33 OSA AND COPD OVERLAP SYNDROME (HCC): Primary | ICD-10-CM

## 2018-10-05 DIAGNOSIS — J44.9 OSA AND COPD OVERLAP SYNDROME (HCC): Primary | ICD-10-CM

## 2018-10-05 PROCEDURE — 99214 OFFICE O/P EST MOD 30 MIN: CPT | Performed by: FAMILY MEDICINE

## 2018-10-05 PROCEDURE — 4004F PT TOBACCO SCREEN RCVD TLK: CPT | Performed by: FAMILY MEDICINE

## 2018-10-05 PROCEDURE — 3017F COLORECTAL CA SCREEN DOC REV: CPT | Performed by: FAMILY MEDICINE

## 2018-10-05 PROCEDURE — G8598 ASA/ANTIPLAT THER USED: HCPCS | Performed by: FAMILY MEDICINE

## 2018-10-05 PROCEDURE — 3023F SPIROM DOC REV: CPT | Performed by: FAMILY MEDICINE

## 2018-10-05 PROCEDURE — G8417 CALC BMI ABV UP PARAM F/U: HCPCS | Performed by: FAMILY MEDICINE

## 2018-10-05 PROCEDURE — G8484 FLU IMMUNIZE NO ADMIN: HCPCS | Performed by: FAMILY MEDICINE

## 2018-10-05 PROCEDURE — G8427 DOCREV CUR MEDS BY ELIG CLIN: HCPCS | Performed by: FAMILY MEDICINE

## 2018-10-05 PROCEDURE — G8926 SPIRO NO PERF OR DOC: HCPCS | Performed by: FAMILY MEDICINE

## 2018-10-05 RX ORDER — BENZONATATE 100 MG/1
100 CAPSULE ORAL 3 TIMES DAILY PRN
Qty: 30 CAPSULE | Refills: 0 | Status: SHIPPED | OUTPATIENT
Start: 2018-10-05 | End: 2018-10-12

## 2018-10-05 RX ORDER — QUETIAPINE FUMARATE 25 MG/1
25 TABLET, FILM COATED ORAL NIGHTLY
Qty: 30 TABLET | Refills: 3 | Status: ON HOLD | OUTPATIENT
Start: 2018-10-05 | End: 2018-11-14 | Stop reason: HOSPADM

## 2018-10-05 NOTE — PROGRESS NOTES
2011    Osteoarthritis, generalized     Pain in both lower legs 10/11/2017    Peripheral vascular disease ([address]     Pneumonia 1/26/2014    Pulmonary edema     resolved    PVD (peripheral vascular disease) with claudication ([address] 8/30/2017    Sleep apnea     uses BI PAP    Tobacco abuse     Tobacco abuse 10/11/2017    Tubular adenoma polyp of rectum     Urinary tract infection due to ESBL Klebsiella 03/08/2017    Ventral hernia        Past Surgical History:   Procedure Laterality Date    BREAST SURGERY  2000    bilateral reduction    BRONCHIAL BRUSH BIOPSY  1/25/2013    Dr Ileana Dunn  04/20/2015    Dr. Amalia Costa  12/2011     DR. Armand Mcmahon,  follows Dr Roxana Henderson  11/15/2013    Dr Rodríguez Brownlee COLONOSCOPY  12/23/2016    Dr Ghassan Bocanegra adenoma & hyperplastic polyps, melanosis coli (repeat one year 12/2017)    CORONARY ARTERY BYPASS GRAFT      ECHO COMPLETE  10/9/2013         ENDOSCOPY, COLON, DIAGNOSTIC  04/18/2017    GALLBLADDER SURGERY      gallstones removed, [facility] 8/2017    HYSTERECTOMY      JOINT REPLACEMENT  2011    LEFT KNEE    KNEE SURGERY      left knee replacement    LAYER WOUND CLOSURE Left [id number]    [facility]  [facility]8/[facility]    [facility]    POLYSOMNOGRAPHY  1/2016    [facility]    UPPER GASTROINTESTINAL ENDOSCOPY  12/20/12    UPPER GASTROINTESTINAL ENDOSCOPY  12/23/2016    Dr Rodríguez Brownlee UPPER GASTROINTESTINAL ENDOSCOPY  02/08/2017       Social History     Social History    Marital status:       Spouse name: N/A    Number of children: 2    Years of education: N/A     Occupational History    disability      Social History Main Topics    Smoking status: Current Every Day Smoker     Packs/day: 0.25     Years: 40.00     Types: Cigarettes     Start date: 8/10/1974    Smokeless tobacco: Never Used      Comment: 1 pack every 3 days (6-7 cig)    Alcohol use No    Drug use: Yes     Types: constipation, diarrhea, nausea and vomiting. Endocrine: Negative for cold intolerance and heat intolerance. Genitourinary: Negative for dysuria, frequency and urgency. Musculoskeletal: Positive for arthralgias, gait problem and neck pain. Negative for back pain and myalgias. Skin: Negative for rash. Allergic/Immunologic: Positive for environmental allergies. Negative for food allergies. Neurological: Negative for dizziness, syncope, light-headedness and headaches. Hematological: Negative for adenopathy. Does not bruise/bleed easily. Psychiatric/Behavioral: Negative for behavioral problems, dysphoric mood and sleep disturbance. The patient is not nervous/anxious. PHYSICAL EXAM  /84   Pulse 81   Resp 20   Wt 260 lb (117.9 kg)   LMP 01/01/1990   SpO2 92%   BMI 47.55 kg/m²   Physical Exam   Constitutional: She is oriented to person, place, and time. She appears well-developed and well-nourished. Uses walker   HENT:   Head: Normocephalic and atraumatic. Right Ear: External ear normal.   Left Ear: External ear normal.   Nose: Nose normal.   Mouth/Throat: Oropharynx is clear and moist.   Eyes: Pupils are equal, round, and reactive to light. Conjunctivae and EOM are normal.   Neck: Normal range of motion. Neck supple. No JVD present. Cardiovascular: Normal rate, regular rhythm and normal heart sounds. No murmur heard. No edema, pvd changes noted on LE   Pulmonary/Chest: Effort normal. No respiratory distress. She has no wheezes. She has no rales. Rhonchi noted bilateral lower lobes   Abdominal: Soft. Bowel sounds are normal. There is no tenderness. Musculoskeletal: Tenderness: lumbar, cervical spinal musculatrue. Uses walker   Lymphadenopathy:     She has no cervical adenopathy. Neurological: She is alert and oriented to person, place, and time. Coordination normal.   Skin: Skin is warm and dry. No rash noted. Psychiatric: She has a normal mood and affect.  Her behavior

## 2018-10-08 ASSESSMENT — ENCOUNTER SYMPTOMS
TROUBLE SWALLOWING: 0
EYE PAIN: 0
VOMITING: 0
SORE THROAT: 0
COUGH: 0
NAUSEA: 0
CONSTIPATION: 0
DIARRHEA: 0
SINUS PRESSURE: 0
ABDOMINAL PAIN: 0
RHINORRHEA: 0
SINUS PAIN: 0
SHORTNESS OF BREATH: 0
BACK PAIN: 0

## 2018-10-11 ENCOUNTER — TELEPHONE (OUTPATIENT)
Dept: FAMILY MEDICINE CLINIC | Age: 63
End: 2018-10-11

## 2018-10-15 ENCOUNTER — TELEPHONE (OUTPATIENT)
Dept: FAMILY MEDICINE CLINIC | Age: 63
End: 2018-10-15

## 2018-10-18 ENCOUNTER — TELEPHONE (OUTPATIENT)
Dept: FAMILY MEDICINE CLINIC | Age: 63
End: 2018-10-18

## 2018-10-19 RX ORDER — CLOTRIMAZOLE AND BETAMETHASONE DIPROPIONATE 10; .64 MG/G; MG/G
CREAM TOPICAL
Qty: 1 TUBE | Refills: 0 | Status: SHIPPED | OUTPATIENT
Start: 2018-10-19 | End: 2019-06-26

## 2018-10-24 ENCOUNTER — APPOINTMENT (OUTPATIENT)
Dept: GENERAL RADIOLOGY | Age: 63
End: 2018-10-24
Payer: MEDICARE

## 2018-10-24 ENCOUNTER — TELEPHONE (OUTPATIENT)
Dept: FAMILY MEDICINE CLINIC | Age: 63
End: 2018-10-24

## 2018-10-24 ENCOUNTER — HOSPITAL ENCOUNTER (EMERGENCY)
Age: 63
Discharge: HOME | End: 2018-10-24
Attending: EMERGENCY MEDICINE
Payer: MEDICARE

## 2018-10-24 VITALS
DIASTOLIC BLOOD PRESSURE: 90 MMHG | OXYGEN SATURATION: 96 % | TEMPERATURE: 98 F | HEART RATE: 81 BPM | SYSTOLIC BLOOD PRESSURE: 158 MMHG | RESPIRATION RATE: 17 BRPM

## 2018-10-24 DIAGNOSIS — S90.119A: Primary | ICD-10-CM

## 2018-10-24 PROCEDURE — 99284 EMERGENCY DEPT VISIT MOD MDM: CPT

## 2018-10-24 PROCEDURE — 6370000000 HC RX 637 (ALT 250 FOR IP): Performed by: EMERGENCY MEDICINE

## 2018-10-24 PROCEDURE — 73660 X-RAY EXAM OF TOE(S): CPT

## 2018-10-24 RX ORDER — HYDROCODONE BITARTRATE AND ACETAMINOPHEN 5; 325 MG/1; MG/1
1 TABLET ORAL ONCE
Status: COMPLETED | OUTPATIENT
Start: 2018-10-24 | End: 2018-10-24

## 2018-10-24 RX ORDER — TRAMADOL HYDROCHLORIDE 50 MG/1
50 TABLET ORAL EVERY 6 HOURS PRN
Qty: 12 TABLET | Refills: 0 | Status: SHIPPED | OUTPATIENT
Start: 2018-10-24 | End: 2018-10-27

## 2018-10-24 RX ADMIN — HYDROCODONE BITARTRATE AND ACETAMINOPHEN 1 TABLET: 5; 325 TABLET ORAL at 17:17

## 2018-10-24 ASSESSMENT — PAIN SCALES - GENERAL
PAINLEVEL_OUTOF10: 10

## 2018-10-24 ASSESSMENT — PAIN DESCRIPTION - ORIENTATION: ORIENTATION: LEFT

## 2018-10-24 ASSESSMENT — PAIN DESCRIPTION - LOCATION: LOCATION: TOE (COMMENT WHICH ONE)

## 2018-10-24 NOTE — ED PROVIDER NOTES
adenoma polyp of rectum; Urinary tract infection due to ESBL Klebsiella; and Ventral hernia. Past Surgical History:  has a past surgical history that includes knee surgery; Upper gastrointestinal endoscopy (12/20/12); Coronary artery bypass graft; layer wound closure (Left, [id number]); Echo Complete (10/9/2013); polysomnography (1/2016); liver biopsy (1/8/2016); bronchial brush biopsy (1/25/2013); Breast surgery (2000); Cholecystectomy (YRS AGO); Cardiac surgery (12/2011); Cardiac catheterization (04/20/2015); Hysterectomy; joint replacement (2011); Upper gastrointestinal endoscopy (12/23/2016); Colonoscopy (11/15/2013); Colonoscopy (12/23/2016); Upper gastrointestinal endoscopy (02/08/2017); Endoscopy, colon, diagnostic (04/18/2017); and Gallbladder surgery. Social History:  reports that she has been smoking Cigarettes. She started smoking about 44 years ago. She has a 10.00 pack-year smoking history. She has never used smokeless tobacco. She reports that she uses drugs, including Marijuana. She reports that she does not drink alcohol. Family History: family history includes Asthma in her father; Cancer in her mother. The patients home medications have been reviewed. Allergies: Latex; Bee venom; Dilaudid [hydromorphone hcl]; Dye [iodides]; Percocet [oxycodone-acetaminophen]; Keflex [cephalexin]; Lasix [furosemide]; Levaquin [levofloxacin in d5w]; Lipitor; Lyrica [pregabalin]; Morphine; Naproxen; Nefazodone; Norvasc [amlodipine]; Oxycodone-acetaminophen; Shellfish-derived products; and Trazodone and nefazodone        ---------------------------------------------------PHYSICAL EXAM--------------------------------------    Constitutional:  Well developed, well nourished, no acute distress, non-toxic appearance   Musculoskeletal:  No edema, left great toe is ecchymotic and tender. Pain with movement. Right great toe has some mild pain with movement. Neurovascular is intact in both feet. . Back- no diagnosis and prognosis. Questions are answered at this time and they are agreeable with the plan.       --------------------------------- IMPRESSION AND DISPOSITION ---------------------------------    IMPRESSION  1.  Contusion of great toe without damage to nail, initial encounter        DISPOSITION  Disposition: Discharge to home  Patient condition is good                  Lorena Kaiser DO  10/24/18 [id number]

## 2018-10-26 ENCOUNTER — HOSPITAL ENCOUNTER (OUTPATIENT)
Dept: ULTRASOUND IMAGING | Age: 63
Discharge: HOME | End: 2018-10-28
Payer: MEDICARE

## 2018-10-26 DIAGNOSIS — R52 PAIN: ICD-10-CM

## 2018-10-26 PROCEDURE — 93926 LOWER EXTREMITY STUDY: CPT

## 2018-10-29 ENCOUNTER — TELEPHONE (OUTPATIENT)
Dept: FAMILY MEDICINE CLINIC | Age: 63
End: 2018-10-29

## 2018-10-29 DIAGNOSIS — L81.9 DISCOLORATION OF SKIN OF TOE: Primary | ICD-10-CM

## 2018-10-29 DIAGNOSIS — E13.59 OTHER SPECIFIED DIABETES MELLITUS WITH OTHER CIRCULATORY COMPLICATIONS (HCC): ICD-10-CM

## 2018-10-29 NOTE — TELEPHONE ENCOUNTER
Pt saran gray stating she has upper and lower GI scopes scheduled for 10/31 and she has to take the meds at 5pm and pills at 6pm but pt not sure what to do if her sugars drop. Please advise.

## 2018-10-31 ENCOUNTER — HOSPITAL ENCOUNTER (OUTPATIENT)
Age: 63
Discharge: HOME | End: 2018-11-02

## 2018-10-31 PROCEDURE — 87081 CULTURE SCREEN ONLY: CPT

## 2018-10-31 PROCEDURE — 88305 TISSUE EXAM BY PATHOLOGIST: CPT

## 2018-11-02 LAB — CLOTEST: NORMAL

## 2018-11-05 ENCOUNTER — TELEPHONE (OUTPATIENT)
Dept: CARDIOLOGY CLINIC | Age: 63
End: 2018-11-05

## 2018-11-07 ENCOUNTER — OFFICE VISIT (OUTPATIENT)
Dept: FAMILY MEDICINE CLINIC | Age: 63
End: 2018-11-07
Payer: MEDICARE

## 2018-11-07 VITALS
BODY MASS INDEX: 46.74 KG/M2 | WEIGHT: 254 LBS | HEART RATE: 95 BPM | OXYGEN SATURATION: 97 % | HEIGHT: 62 IN | TEMPERATURE: 97.8 F | DIASTOLIC BLOOD PRESSURE: 105 MMHG | SYSTOLIC BLOOD PRESSURE: 159 MMHG | RESPIRATION RATE: 18 BRPM

## 2018-11-07 DIAGNOSIS — E66.01 MORBID OBESITY DUE TO EXCESS CALORIES (HCC): ICD-10-CM

## 2018-11-07 DIAGNOSIS — I25.10 CORONARY ARTERY DISEASE INVOLVING NATIVE CORONARY ARTERY OF NATIVE HEART WITHOUT ANGINA PECTORIS: Chronic | ICD-10-CM

## 2018-11-07 DIAGNOSIS — E11.8 TYPE 2 DIABETES MELLITUS WITH COMPLICATION, WITH LONG-TERM CURRENT USE OF INSULIN (HCC): Primary | Chronic | ICD-10-CM

## 2018-11-07 DIAGNOSIS — Z79.4 TYPE 2 DIABETES MELLITUS WITH COMPLICATION, WITH LONG-TERM CURRENT USE OF INSULIN (HCC): Primary | Chronic | ICD-10-CM

## 2018-11-07 DIAGNOSIS — E11.42 DIABETIC POLYNEUROPATHY ASSOCIATED WITH TYPE 2 DIABETES MELLITUS (HCC): ICD-10-CM

## 2018-11-07 DIAGNOSIS — I73.9 PVD (PERIPHERAL VASCULAR DISEASE) WITH CLAUDICATION (HCC): ICD-10-CM

## 2018-11-07 DIAGNOSIS — R32 URINARY INCONTINENCE, UNSPECIFIED TYPE: ICD-10-CM

## 2018-11-07 DIAGNOSIS — I10 ESSENTIAL HYPERTENSION: Chronic | ICD-10-CM

## 2018-11-07 PROCEDURE — G8598 ASA/ANTIPLAT THER USED: HCPCS | Performed by: FAMILY MEDICINE

## 2018-11-07 PROCEDURE — 3017F COLORECTAL CA SCREEN DOC REV: CPT | Performed by: FAMILY MEDICINE

## 2018-11-07 PROCEDURE — 99215 OFFICE O/P EST HI 40 MIN: CPT | Performed by: FAMILY MEDICINE

## 2018-11-07 PROCEDURE — G8427 DOCREV CUR MEDS BY ELIG CLIN: HCPCS | Performed by: FAMILY MEDICINE

## 2018-11-07 PROCEDURE — G8484 FLU IMMUNIZE NO ADMIN: HCPCS | Performed by: FAMILY MEDICINE

## 2018-11-07 PROCEDURE — G8417 CALC BMI ABV UP PARAM F/U: HCPCS | Performed by: FAMILY MEDICINE

## 2018-11-07 PROCEDURE — 4004F PT TOBACCO SCREEN RCVD TLK: CPT | Performed by: FAMILY MEDICINE

## 2018-11-07 PROCEDURE — 2022F DILAT RTA XM EVC RTNOPTHY: CPT | Performed by: FAMILY MEDICINE

## 2018-11-07 PROCEDURE — 3044F HG A1C LEVEL LT 7.0%: CPT | Performed by: FAMILY MEDICINE

## 2018-11-07 NOTE — PROGRESS NOTES
POLYSOMNOGRAPHY  1/2016    [facility]    UPPER GASTROINTESTINAL ENDOSCOPY  12/20/12    UPPER GASTROINTESTINAL ENDOSCOPY  12/23/2016    Dr Trav Reno   [facility] ENDOSCOPY  02/08/2017       Social History     Social History    Marital status:      Spouse name: N/A    Number of children: 2    Years of education: N/A     Occupational History    disability      Social History Main Topics    Smoking status: Current Every Day Smoker     Packs/day: 0.25     Years: 40.00     Types: Cigarettes     Start date: 8/10/1974    Smokeless tobacco: Never Used      Comment: 1 pack every 3 days (6-7 cig)    Alcohol use No    Drug use: Yes     Types: Marijuana      Comment: \"every 4th or 5th day out of the week\"    Sexual activity: Not Asked     Other Topics Concern    None     Social History Narrative    Pt lives with brother in her house-pt has never driven a car and depends on transportation       Family History   Problem Relation Age of Onset    Cancer Mother     Asthma Father           Current Outpatient Prescriptions   Medication Sig Dispense Refill    Nutritional Supplements (GLUCOSE MANAGEMENT) TABS Use daily prn if low glucose <60 30 tablet 2    Insulin Pen Needle (B-D UF III MINI PEN NEEDLES) 31G X 5 MM MISC 1 each by Does not apply route daily 100 each 3    clotrimazole-betamethasone (LOTRISONE) 1-0.05 % cream Apply topically 2 times daily. 1 Tube 0    insulin lispro (HUMALOG KWIKPEN) 100 UNIT/ML pen Inject 35 units tid before meals 5 pen 3    QUEtiapine (SEROQUEL) 25 MG tablet Take 1 tablet by mouth nightly 30 tablet 3    gabapentin (NEURONTIN) 300 MG capsule Take 2 capsules by mouth 3 times daily for 30 days. . 180 capsule 2    hydrALAZINE (APRESOLINE) 100 MG tablet Take 1 tablet by mouth 3 times daily 270 tablet 3    isosorbide mononitrate (IMDUR) 60 MG extended release tablet Take 1 tablet by mouth daily 90 tablet 3    metoprolol succinate (TOPROL XL) 25 MG extended release tablet Take 1 tablet by mouth daily 90 tablet 3    sacubitril-valsartan (ENTRESTO) 24-26 MG per tablet Take 1 tablet by mouth 2 times daily 180 tablet 3    simvastatin (ZOCOR) 20 MG tablet Take 1 tablet by mouth nightly 90 tablet 3    nitroGLYCERIN (NITROSTAT) 0.4 MG SL tablet up to max of 3 total doses. If no relief after 1 dose, call 911. 25 tablet 3    vitamin D (ERGOCALCIFEROL) 62993 units CAPS capsule Take 1 capsule by mouth once a week On Sundays 12 capsule 0    miconazole (MICOTIN) 2 % cream Apply topically 2 times daily. 1 Tube 1    DULoxetine (CYMBALTA) 60 MG extended release capsule TAKE ONE CAPSULE BY MOUTH TWICE A  capsule 0    Insulin Degludec (TRESIBA FLEXTOUCH) 200 UNIT/ML SOPN Inject 70 Units into the skin 2 times daily 15 pen 2    bisacodyl (DULCOLAX) 10 MG suppository Place 10 mg rectally daily      docusate sodium (COLACE) 100 MG capsule Take 100 mg by mouth 2 times daily      colestipol (COLESTID) 1 g tablet Take 1 g by mouth 3 times daily      darifenacin (ENABLEX) 7.5 MG extended release tablet Take 7.5 mg by mouth daily      dicyclomine (BENTYL) 20 MG tablet Take 10 mg by mouth 2 times daily      diphenoxylate-atropine (LOMOTIL) 2.5-0.025 MG per tablet Take 1 tablet by mouth 4 times daily as needed for Diarrhea. Cecily Southern Omega-3 Fatty Acids (FISH OIL OMEGA-3 PO) Take by mouth      lidocaine (XYLOCAINE) 5 % ointment Apply topically as needed for Pain Apply topically as needed.       famotidine (PEPCID) 40 MG tablet Take 40 mg by mouth daily      ursodiol (ACTIGALL) 300 MG capsule Take 300 mg by mouth 2 times daily      ondansetron (ZOFRAN) 4 MG tablet Take 4 mg by mouth every 8 hours as needed for Nausea or Vomiting      bumetanide (BUMEX) 1 MG tablet Take 1 tablet by mouth daily 30 tablet 5    mometasone-formoterol (DULERA) 200-5 MCG/ACT inhaler Inhale 2 puffs into the lungs every 12 hours      Multiple Vitamins-Minerals (THERAPEUTIC MULTIVITAMIN-MINERALS) tablet Take 1 Ear: External ear normal.   Left Ear: External ear normal.   Nose: Nose normal.   Mouth/Throat: Oropharynx is clear and moist.   Eyes: Pupils are equal, round, and reactive to light. Conjunctivae and EOM are normal.   Neck: Normal range of motion. Neck supple. No JVD present. Cardiovascular: Normal rate, regular rhythm and normal heart sounds. No murmur heard. No edema, pvd changes noted on LE   Pulmonary/Chest: Effort normal. No respiratory distress. She has no wheezes. She has no rales. Rhonchi noted bilateral lower lobes   Abdominal: Soft. Bowel sounds are normal. There is no tenderness. Musculoskeletal: She exhibits tenderness (lumbar, cervical spinal musculatrue). Uses walker  Left toe is purple in color. Right toe does not have any changes. Both toes are very painful to palpation and touch. Lymphadenopathy:     She has no cervical adenopathy. Neurological: She is alert and oriented to person, place, and time. Coordination normal.   Skin: Skin is warm and dry. No rash noted. Psychiatric: She has a normal mood and affect. Her behavior is normal.   Nursing note and vitals reviewed. Laboratory:   All laboratory and radiology results have been personally reviewed by myself    Lab Results   Component Value Date     07/31/2018    K 3.7 07/31/2018    K 3.6 07/29/2018    CL 97 07/31/2018    CO2 27 07/31/2018    BUN 14 07/31/2018    CREATININE 0.9 07/31/2018    PROT 7.2 07/26/2018    LABALBU 3.8 07/26/2018    LABALBU 5.2 12/01/2011    CALCIUM 9.2 07/31/2018    GFRAA >60 07/31/2018    LABGLOM >60 07/31/2018    GLUCOSE 131 07/31/2018    GLUCOSE 181 12/01/2011    AST 20 07/26/2018    ALT 25 07/26/2018    ALKPHOS 87 07/26/2018    BILITOT 0.3 07/26/2018    TSH 1.560 11/07/2017    VITD25 33 12/01/2016    CHOL 222 06/05/2018    TRIG 84 06/05/2018    HDL 38 06/05/2018    LDLCALC 167 06/05/2018    LABA1C 6.0 09/13/2018        Lab Results   Component Value Date    CHOL 222 (H) 06/05/2018    CHOL

## 2018-11-08 ENCOUNTER — HOSPITAL ENCOUNTER (INPATIENT)
Age: 63
LOS: 1 days | Discharge: ANOTHER HOSPITAL | DRG: 300 | End: 2018-11-09
Attending: EMERGENCY MEDICINE | Admitting: INTERNAL MEDICINE
Payer: MEDICARE

## 2018-11-08 ENCOUNTER — TELEPHONE (OUTPATIENT)
Dept: FAMILY MEDICINE CLINIC | Age: 63
End: 2018-11-08

## 2018-11-08 ENCOUNTER — APPOINTMENT (OUTPATIENT)
Dept: GENERAL RADIOLOGY | Age: 63
DRG: 300 | End: 2018-11-08
Payer: MEDICARE

## 2018-11-08 DIAGNOSIS — D72.829 LEUKOCYTOSIS, UNSPECIFIED TYPE: ICD-10-CM

## 2018-11-08 DIAGNOSIS — I96 GANGRENE OF TOE OF LEFT FOOT (HCC): Primary | ICD-10-CM

## 2018-11-08 LAB
ALBUMIN SERPL-MCNC: 3.9 G/DL (ref 3.5–5.2)
ALP BLD-CCNC: 85 U/L (ref 35–104)
ALT SERPL-CCNC: 34 U/L (ref 0–32)
ANION GAP SERPL CALCULATED.3IONS-SCNC: 10 MMOL/L (ref 7–16)
AST SERPL-CCNC: 24 U/L (ref 0–31)
BASOPHILS ABSOLUTE: 0.02 E9/L (ref 0–0.2)
BASOPHILS RELATIVE PERCENT: 0.2 % (ref 0–2)
BILIRUB SERPL-MCNC: 0.3 MG/DL (ref 0–1.2)
BUN BLDV-MCNC: 21 MG/DL (ref 8–23)
C-REACTIVE PROTEIN: 0.7 MG/DL (ref 0–0.4)
CALCIUM SERPL-MCNC: 8.6 MG/DL (ref 8.6–10.2)
CHLORIDE BLD-SCNC: 101 MMOL/L (ref 98–107)
CO2: 27 MMOL/L (ref 22–29)
CREAT SERPL-MCNC: 0.9 MG/DL (ref 0.5–1)
EOSINOPHILS ABSOLUTE: 0.04 E9/L (ref 0.05–0.5)
EOSINOPHILS RELATIVE PERCENT: 0.3 % (ref 0–6)
GFR AFRICAN AMERICAN: >60
GFR NON-AFRICAN AMERICAN: >60 ML/MIN/1.73
GLUCOSE BLD-MCNC: 168 MG/DL (ref 74–99)
HCT VFR BLD CALC: 46 % (ref 34–48)
HEMOGLOBIN: 14.4 G/DL (ref 11.5–15.5)
IMMATURE GRANULOCYTES #: 0.05 E9/L
IMMATURE GRANULOCYTES %: 0.4 % (ref 0–5)
LACTIC ACID: 2.1 MMOL/L (ref 0.5–2.2)
LYMPHOCYTES ABSOLUTE: 0.6 E9/L (ref 1.5–4)
LYMPHOCYTES RELATIVE PERCENT: 5.1 % (ref 20–42)
MCH RBC QN AUTO: 31.6 PG (ref 26–35)
MCHC RBC AUTO-ENTMCNC: 31.3 % (ref 32–34.5)
MCV RBC AUTO: 100.9 FL (ref 80–99.9)
METER GLUCOSE: 181 MG/DL (ref 74–99)
MONOCYTES ABSOLUTE: 0.44 E9/L (ref 0.1–0.95)
MONOCYTES RELATIVE PERCENT: 3.7 % (ref 2–12)
NEUTROPHILS ABSOLUTE: 10.66 E9/L (ref 1.8–7.3)
NEUTROPHILS RELATIVE PERCENT: 90.3 % (ref 43–80)
PDW BLD-RTO: 13.2 FL (ref 11.5–15)
PLATELET # BLD: 242 E9/L (ref 130–450)
PMV BLD AUTO: 11.3 FL (ref 7–12)
POTASSIUM SERPL-SCNC: 4.6 MMOL/L (ref 3.5–5)
RBC # BLD: 4.56 E12/L (ref 3.5–5.5)
SEDIMENTATION RATE, ERYTHROCYTE: 17 MM/HR (ref 0–20)
SODIUM BLD-SCNC: 138 MMOL/L (ref 132–146)
TOTAL PROTEIN: 6.8 G/DL (ref 6.4–8.3)
WBC # BLD: 11.8 E9/L (ref 4.5–11.5)

## 2018-11-08 PROCEDURE — 6370000000 HC RX 637 (ALT 250 FOR IP): Performed by: INTERNAL MEDICINE

## 2018-11-08 PROCEDURE — 6360000002 HC RX W HCPCS: Performed by: PHYSICIAN ASSISTANT

## 2018-11-08 PROCEDURE — 94640 AIRWAY INHALATION TREATMENT: CPT

## 2018-11-08 PROCEDURE — 86140 C-REACTIVE PROTEIN: CPT

## 2018-11-08 PROCEDURE — 94660 CPAP INITIATION&MGMT: CPT

## 2018-11-08 PROCEDURE — 80053 COMPREHEN METABOLIC PANEL: CPT

## 2018-11-08 PROCEDURE — 82962 GLUCOSE BLOOD TEST: CPT

## 2018-11-08 PROCEDURE — 99284 EMERGENCY DEPT VISIT MOD MDM: CPT

## 2018-11-08 PROCEDURE — 6360000002 HC RX W HCPCS: Performed by: INTERNAL MEDICINE

## 2018-11-08 PROCEDURE — 73630 X-RAY EXAM OF FOOT: CPT

## 2018-11-08 PROCEDURE — 83605 ASSAY OF LACTIC ACID: CPT

## 2018-11-08 PROCEDURE — 2580000003 HC RX 258: Performed by: PHYSICIAN ASSISTANT

## 2018-11-08 PROCEDURE — 85025 COMPLETE CBC W/AUTO DIFF WBC: CPT

## 2018-11-08 PROCEDURE — 87040 BLOOD CULTURE FOR BACTERIA: CPT

## 2018-11-08 PROCEDURE — 36415 COLL VENOUS BLD VENIPUNCTURE: CPT

## 2018-11-08 PROCEDURE — 1200000000 HC SEMI PRIVATE

## 2018-11-08 PROCEDURE — 2700000000 HC OXYGEN THERAPY PER DAY

## 2018-11-08 PROCEDURE — 99223 1ST HOSP IP/OBS HIGH 75: CPT | Performed by: INTERNAL MEDICINE

## 2018-11-08 PROCEDURE — 85651 RBC SED RATE NONAUTOMATED: CPT

## 2018-11-08 RX ORDER — ISOSORBIDE MONONITRATE 60 MG/1
60 TABLET, EXTENDED RELEASE ORAL DAILY
Status: DISCONTINUED | OUTPATIENT
Start: 2018-11-08 | End: 2018-11-09 | Stop reason: HOSPADM

## 2018-11-08 RX ORDER — OLOPATADINE HYDROCHLORIDE 2 MG/ML
1 SOLUTION/ DROPS OPHTHALMIC 2 TIMES DAILY
COMMUNITY
End: 2021-05-05

## 2018-11-08 RX ORDER — PREDNISONE 1 MG/1
5 TABLET ORAL DAILY
Status: DISCONTINUED | OUTPATIENT
Start: 2018-11-08 | End: 2018-11-09 | Stop reason: HOSPADM

## 2018-11-08 RX ORDER — ONDANSETRON 4 MG/1
4 TABLET, FILM COATED ORAL EVERY 8 HOURS PRN
Status: DISCONTINUED | OUTPATIENT
Start: 2018-11-08 | End: 2018-11-09 | Stop reason: HOSPADM

## 2018-11-08 RX ORDER — DIPHENHYDRAMINE HYDROCHLORIDE 50 MG/ML
25 INJECTION INTRAMUSCULAR; INTRAVENOUS EVERY 6 HOURS PRN
Status: DISCONTINUED | OUTPATIENT
Start: 2018-11-08 | End: 2018-11-09 | Stop reason: HOSPADM

## 2018-11-08 RX ORDER — URSODIOL 300 MG/1
300 CAPSULE ORAL 2 TIMES DAILY
Status: DISCONTINUED | OUTPATIENT
Start: 2018-11-08 | End: 2018-11-09 | Stop reason: HOSPADM

## 2018-11-08 RX ORDER — OXYBUTYNIN CHLORIDE 5 MG/1
5 TABLET, EXTENDED RELEASE ORAL NIGHTLY
Status: DISCONTINUED | OUTPATIENT
Start: 2018-11-08 | End: 2018-11-09 | Stop reason: HOSPADM

## 2018-11-08 RX ORDER — CETIRIZINE HYDROCHLORIDE 10 MG/1
10 TABLET ORAL DAILY
Status: DISCONTINUED | OUTPATIENT
Start: 2018-11-08 | End: 2018-11-09 | Stop reason: HOSPADM

## 2018-11-08 RX ORDER — QUETIAPINE FUMARATE 25 MG/1
25 TABLET, FILM COATED ORAL NIGHTLY
Status: DISCONTINUED | OUTPATIENT
Start: 2018-11-08 | End: 2018-11-09 | Stop reason: HOSPADM

## 2018-11-08 RX ORDER — GABAPENTIN 300 MG/1
600 CAPSULE ORAL 3 TIMES DAILY
COMMUNITY
End: 2021-04-15 | Stop reason: ALTCHOICE

## 2018-11-08 RX ORDER — METOCLOPRAMIDE 10 MG/1
10 TABLET ORAL NIGHTLY
Status: DISCONTINUED | OUTPATIENT
Start: 2018-11-08 | End: 2018-11-09 | Stop reason: HOSPADM

## 2018-11-08 RX ORDER — ACETAMINOPHEN 500 MG
1000 TABLET ORAL EVERY 6 HOURS PRN
Status: DISCONTINUED | OUTPATIENT
Start: 2018-11-08 | End: 2018-11-09 | Stop reason: HOSPADM

## 2018-11-08 RX ORDER — OXYBUTYNIN CHLORIDE 5 MG/1
5 TABLET, EXTENDED RELEASE ORAL NIGHTLY
COMMUNITY
End: 2018-11-19 | Stop reason: SDUPTHER

## 2018-11-08 RX ORDER — METOPROLOL SUCCINATE 25 MG/1
25 TABLET, EXTENDED RELEASE ORAL 2 TIMES DAILY
Status: DISCONTINUED | OUTPATIENT
Start: 2018-11-08 | End: 2018-11-09 | Stop reason: HOSPADM

## 2018-11-08 RX ORDER — OLOPATADINE HYDROCHLORIDE 2 MG/ML
1 SOLUTION/ DROPS OPHTHALMIC DAILY
Status: DISCONTINUED | OUTPATIENT
Start: 2018-11-08 | End: 2018-11-09 | Stop reason: HOSPADM

## 2018-11-08 RX ORDER — HYDRALAZINE HYDROCHLORIDE 50 MG/1
100 TABLET, FILM COATED ORAL 3 TIMES DAILY
Status: DISCONTINUED | OUTPATIENT
Start: 2018-11-08 | End: 2018-11-09 | Stop reason: HOSPADM

## 2018-11-08 RX ORDER — DICYCLOMINE HYDROCHLORIDE 10 MG/1
10 CAPSULE ORAL 2 TIMES DAILY
Status: DISCONTINUED | OUTPATIENT
Start: 2018-11-08 | End: 2018-11-09 | Stop reason: HOSPADM

## 2018-11-08 RX ORDER — BUMETANIDE 1 MG/1
1 TABLET ORAL DAILY
COMMUNITY
End: 2019-06-26 | Stop reason: SDUPTHER

## 2018-11-08 RX ORDER — AMOXICILLIN AND CLAVULANATE POTASSIUM 500; 125 MG/1; MG/1
1 TABLET, FILM COATED ORAL 3 TIMES DAILY
Status: DISCONTINUED | OUTPATIENT
Start: 2018-11-08 | End: 2018-11-09 | Stop reason: HOSPADM

## 2018-11-08 RX ORDER — PREDNISONE 1 MG/1
5 TABLET ORAL SEE ADMIN INSTRUCTIONS
COMMUNITY
Start: 2018-10-31 | End: 2018-12-08

## 2018-11-08 RX ORDER — SIMVASTATIN 20 MG
20 TABLET ORAL NIGHTLY
Status: DISCONTINUED | OUTPATIENT
Start: 2018-11-08 | End: 2018-11-09 | Stop reason: HOSPADM

## 2018-11-08 RX ORDER — ASPIRIN 81 MG/1
81 TABLET, CHEWABLE ORAL DAILY
Status: DISCONTINUED | OUTPATIENT
Start: 2018-11-08 | End: 2018-11-09 | Stop reason: HOSPADM

## 2018-11-08 RX ORDER — AMOXICILLIN AND CLAVULANATE POTASSIUM 500; 125 MG/1; MG/1
1 TABLET, FILM COATED ORAL 3 TIMES DAILY
Status: ON HOLD | COMMUNITY
Start: 2018-11-04 | End: 2018-11-14 | Stop reason: HOSPADM

## 2018-11-08 RX ORDER — BUMETANIDE 1 MG/1
1 TABLET ORAL SEE ADMIN INSTRUCTIONS
Status: DISCONTINUED | OUTPATIENT
Start: 2018-11-08 | End: 2018-11-08 | Stop reason: ALTCHOICE

## 2018-11-08 RX ORDER — DIPHENOXYLATE HYDROCHLORIDE AND ATROPINE SULFATE 2.5; .025 MG/1; MG/1
1 TABLET ORAL 4 TIMES DAILY PRN
Status: DISCONTINUED | OUTPATIENT
Start: 2018-11-08 | End: 2018-11-09 | Stop reason: HOSPADM

## 2018-11-08 RX ORDER — BENZONATATE 100 MG/1
100 CAPSULE ORAL 3 TIMES DAILY PRN
Status: DISCONTINUED | OUTPATIENT
Start: 2018-11-08 | End: 2018-11-09 | Stop reason: HOSPADM

## 2018-11-08 RX ORDER — HYDROCODONE BITARTRATE AND ACETAMINOPHEN 5; 325 MG/1; MG/1
1 TABLET ORAL EVERY 6 HOURS PRN
Status: DISCONTINUED | OUTPATIENT
Start: 2018-11-08 | End: 2018-11-09 | Stop reason: HOSPADM

## 2018-11-08 RX ORDER — PROMETHAZINE HYDROCHLORIDE 25 MG/ML
12.5 INJECTION, SOLUTION INTRAMUSCULAR; INTRAVENOUS EVERY 6 HOURS PRN
Status: DISCONTINUED | OUTPATIENT
Start: 2018-11-08 | End: 2018-11-09 | Stop reason: HOSPADM

## 2018-11-08 RX ORDER — FLUTICASONE PROPIONATE 50 MCG
2 SPRAY, SUSPENSION (ML) NASAL 2 TIMES DAILY
Status: DISCONTINUED | OUTPATIENT
Start: 2018-11-08 | End: 2018-11-09 | Stop reason: HOSPADM

## 2018-11-08 RX ORDER — METOCLOPRAMIDE 5 MG/1
5 TABLET ORAL SEE ADMIN INSTRUCTIONS
COMMUNITY
End: 2019-06-27

## 2018-11-08 RX ORDER — PANTOPRAZOLE SODIUM 40 MG/1
40 TABLET, DELAYED RELEASE ORAL 2 TIMES DAILY
Status: DISCONTINUED | OUTPATIENT
Start: 2018-11-08 | End: 2018-11-09 | Stop reason: HOSPADM

## 2018-11-08 RX ORDER — METOCLOPRAMIDE 10 MG/1
5 TABLET ORAL
Status: DISCONTINUED | OUTPATIENT
Start: 2018-11-08 | End: 2018-11-09 | Stop reason: HOSPADM

## 2018-11-08 RX ORDER — DULOXETIN HYDROCHLORIDE 60 MG/1
60 CAPSULE, DELAYED RELEASE ORAL DAILY
Status: DISCONTINUED | OUTPATIENT
Start: 2018-11-08 | End: 2018-11-09 | Stop reason: HOSPADM

## 2018-11-08 RX ORDER — METOCLOPRAMIDE 10 MG/1
5 TABLET ORAL SEE ADMIN INSTRUCTIONS
Status: DISCONTINUED | OUTPATIENT
Start: 2018-11-08 | End: 2018-11-08 | Stop reason: CLARIF

## 2018-11-08 RX ORDER — ALBUTEROL SULFATE 2.5 MG/3ML
2.5 SOLUTION RESPIRATORY (INHALATION) EVERY 6 HOURS PRN
Status: DISCONTINUED | OUTPATIENT
Start: 2018-11-08 | End: 2018-11-09 | Stop reason: HOSPADM

## 2018-11-08 RX ORDER — MONTELUKAST SODIUM 10 MG/1
10 TABLET ORAL NIGHTLY
Status: DISCONTINUED | OUTPATIENT
Start: 2018-11-08 | End: 2018-11-09 | Stop reason: HOSPADM

## 2018-11-08 RX ORDER — GABAPENTIN 300 MG/1
600 CAPSULE ORAL 3 TIMES DAILY
Status: DISCONTINUED | OUTPATIENT
Start: 2018-11-08 | End: 2018-11-09 | Stop reason: HOSPADM

## 2018-11-08 RX ADMIN — GABAPENTIN 600 MG: 300 CAPSULE ORAL at 22:26

## 2018-11-08 RX ADMIN — URSODIOL 300 MG: 300 CAPSULE ORAL at 22:28

## 2018-11-08 RX ADMIN — MOMETASONE FUROATE AND FORMOTEROL FUMARATE DIHYDRATE 2 PUFF: 200; 5 AEROSOL RESPIRATORY (INHALATION) at 22:06

## 2018-11-08 RX ADMIN — HYDROCODONE BITARTRATE AND ACETAMINOPHEN 1 TABLET: 5; 325 TABLET ORAL at 22:24

## 2018-11-08 RX ADMIN — ISOSORBIDE MONONITRATE 60 MG: 60 TABLET, EXTENDED RELEASE ORAL at 22:25

## 2018-11-08 RX ADMIN — DICYCLOMINE HYDROCHLORIDE 10 MG: 10 CAPSULE ORAL at 22:26

## 2018-11-08 RX ADMIN — OXYBUTYNIN CHLORIDE 5 MG: 5 TABLET, EXTENDED RELEASE ORAL at 22:28

## 2018-11-08 RX ADMIN — PANTOPRAZOLE SODIUM 40 MG: 40 TABLET, DELAYED RELEASE ORAL at 22:25

## 2018-11-08 RX ADMIN — METOPROLOL SUCCINATE 25 MG: 25 TABLET, FILM COATED, EXTENDED RELEASE ORAL at 22:32

## 2018-11-08 RX ADMIN — MONTELUKAST SODIUM 10 MG: 10 TABLET, FILM COATED ORAL at 22:29

## 2018-11-08 RX ADMIN — AMPICILLIN SODIUM AND SULBACTAM SODIUM 3 G: 2; 1 INJECTION, POWDER, FOR SOLUTION INTRAMUSCULAR; INTRAVENOUS at 19:41

## 2018-11-08 RX ADMIN — DULOXETINE HYDROCHLORIDE 60 MG: 60 CAPSULE, DELAYED RELEASE ORAL at 22:28

## 2018-11-08 RX ADMIN — VANCOMYCIN HYDROCHLORIDE 2000 MG: 500 INJECTION, POWDER, LYOPHILIZED, FOR SOLUTION INTRAVENOUS at 20:59

## 2018-11-08 RX ADMIN — SIMVASTATIN 20 MG: 20 TABLET, FILM COATED ORAL at 22:26

## 2018-11-08 RX ADMIN — DIPHENHYDRAMINE HYDROCHLORIDE 25 MG: 50 INJECTION, SOLUTION INTRAMUSCULAR; INTRAVENOUS at 22:24

## 2018-11-08 RX ADMIN — METOCLOPRAMIDE 10 MG: 10 TABLET ORAL at 22:25

## 2018-11-08 RX ADMIN — SACUBITRIL AND VALSARTAN 1 TABLET: 24; 26 TABLET, FILM COATED ORAL at 22:29

## 2018-11-08 RX ADMIN — AMOXICILLIN AND CLAVULANATE POTASSIUM 1 TABLET: 500; 125 TABLET, FILM COATED ORAL at 22:25

## 2018-11-08 RX ADMIN — QUETIAPINE FUMARATE 25 MG: 25 TABLET ORAL at 22:27

## 2018-11-08 RX ADMIN — HYDRALAZINE HYDROCHLORIDE 100 MG: 50 TABLET, FILM COATED ORAL at 22:26

## 2018-11-08 ASSESSMENT — ENCOUNTER SYMPTOMS
NAUSEA: 0
SINUS PAIN: 0
RHINORRHEA: 0
SHORTNESS OF BREATH: 0
VOMITING: 0
EYE PAIN: 0
CONSTIPATION: 0
SINUS PRESSURE: 0
BACK PAIN: 0
DIARRHEA: 0
SORE THROAT: 0
TROUBLE SWALLOWING: 0
COUGH: 1
ABDOMINAL PAIN: 0

## 2018-11-08 ASSESSMENT — PAIN SCALES - GENERAL
PAINLEVEL_OUTOF10: 10
PAINLEVEL_OUTOF10: 10

## 2018-11-08 ASSESSMENT — PAIN DESCRIPTION - PAIN TYPE
TYPE: ACUTE PAIN
TYPE: ACUTE PAIN

## 2018-11-08 ASSESSMENT — PAIN DESCRIPTION - ORIENTATION: ORIENTATION: LEFT

## 2018-11-08 ASSESSMENT — PAIN DESCRIPTION - LOCATION
LOCATION: FOOT
LOCATION: NECK;THROAT

## 2018-11-08 NOTE — ED PROVIDER NOTES
ED Attending  CC: No     Department of Emergency Medicine   ED  Provider Note  Admit Date/RoomTime: 11/8/2018  4:01 PM  ED Room: 13/13  Chief Complaint   Wound Infection (sent from [facility] (pod. ) for admission with acute gangrene in left hallux)    History of Present Illness   Source of history provided by:  patient. History/Exam Limitations: none. Linnette Mcallister is a 60 y.o. old female presenting to the emergency department by private vehicle, for left, great toe pain which occured a few week(s) prior to arrival. Cause of complaint: none known while at home. States she was sent in by her podiatrist for admission for acute gangrene of the L hallux. Podiatrist wants her admitted with vascular consult and PVRs and toe pressures. Pt states it has been like this for a few weeks with tenderness. She states she has had a fever for a few days. Denies nausea, vomiting, chills. Pt is a diabetic and a smoker. ROS    Pertinent positives and negatives are stated within HPI, all other systems reviewed and are negative. Past Surgical History:   Procedure Laterality Date    BREAST SURGERY  2000    bilateral reduction    BRONCHIAL BRUSH BIOPSY  1/25/2013    Dr Valeria Guadarrama  04/20/2015    Dr. Michael Arnold  12/2011     DR. Reyes De,  follows Dr Brittaney Noble  11/15/2013    Dr Yanet Johnson COLONOSCOPY  12/23/2016    Dr Jaiden Lucas adenoma & hyperplastic polyps, melanosis coli (repeat one year 12/2017)    CORONARY ARTERY BYPASS GRAFT      ECHO COMPLETE  10/9/2013         ENDOSCOPY, COLON, DIAGNOSTIC  04/18/2017    GALLBLADDER SURGERY      gallstones removed, [facility] 8/2017    HYSTERECTOMY      JOINT REPLACEMENT  2011    LEFT KNEE    KNEE SURGERY      left knee replacement    LAYER WOUND CLOSURE Left [id number]    [facility]  [facility]/8/2016    [facility]    [facility]YSOM[facility]GRAPHY  1/2016    [facility]    UPPER GASTROINTESTINAL ENDOSCOPY  12/20/12

## 2018-11-09 ENCOUNTER — HOSPITAL ENCOUNTER (INPATIENT)
Age: 63
LOS: 5 days | Discharge: HOME HEALTH | DRG: 271 | End: 2018-11-14
Attending: STUDENT IN AN ORGANIZED HEALTH CARE EDUCATION/TRAINING PROGRAM | Admitting: STUDENT IN AN ORGANIZED HEALTH CARE EDUCATION/TRAINING PROGRAM
Payer: MEDICARE

## 2018-11-09 ENCOUNTER — HOSPITAL ENCOUNTER (OUTPATIENT)
Age: 63
Discharge: HOME | End: 2018-11-09
Payer: MEDICARE

## 2018-11-09 VITALS
TEMPERATURE: 98.3 F | HEIGHT: 62 IN | RESPIRATION RATE: 13 BRPM | SYSTOLIC BLOOD PRESSURE: 146 MMHG | WEIGHT: 253.5 LBS | OXYGEN SATURATION: 97 % | DIASTOLIC BLOOD PRESSURE: 64 MMHG | HEART RATE: 77 BPM | BODY MASS INDEX: 46.65 KG/M2

## 2018-11-09 DIAGNOSIS — I73.9 PVD (PERIPHERAL VASCULAR DISEASE) (HCC): Primary | ICD-10-CM

## 2018-11-09 PROBLEM — I70.222 ATHEROSCLEROSIS OF NATIVE ARTERY OF LEFT LEG WITH REST PAIN (HCC): Chronic | Status: ACTIVE | Noted: 2018-11-09

## 2018-11-09 LAB
FILM ARRAY ADENOVIRUS: NORMAL
FILM ARRAY BORDETELLA PERTUSSIS: NORMAL
FILM ARRAY CHLAMYDOPHILIA PNEUMONIAE: NORMAL
FILM ARRAY CORONAVIRUS 229E: NORMAL
FILM ARRAY CORONAVIRUS HKU1: NORMAL
FILM ARRAY CORONAVIRUS NL63: NORMAL
FILM ARRAY CORONAVIRUS OC43: NORMAL
FILM ARRAY INFLUENZA A VIRUS 09H1: NORMAL
FILM ARRAY INFLUENZA A VIRUS H1: NORMAL
FILM ARRAY INFLUENZA A VIRUS H3: NORMAL
FILM ARRAY INFLUENZA A VIRUS: NORMAL
FILM ARRAY INFLUENZA B: NORMAL
FILM ARRAY METAPNEUMOVIRUS: NORMAL
FILM ARRAY MYCOPLASMA PNEUMONIAE: NORMAL
FILM ARRAY PARAINFLUENZA VIRUS 1: NORMAL
FILM ARRAY PARAINFLUENZA VIRUS 2: NORMAL
FILM ARRAY PARAINFLUENZA VIRUS 3: NORMAL
FILM ARRAY PARAINFLUENZA VIRUS 4: NORMAL
FILM ARRAY RESPIRATORY SYNCITIAL VIRUS: NORMAL
FILM ARRAY RHINOVIRUS/ENTEROVIRUS: NORMAL
METER GLUCOSE: 128 MG/DL (ref 74–99)
METER GLUCOSE: 135 MG/DL (ref 74–99)
METER GLUCOSE: 146 MG/DL (ref 74–99)

## 2018-11-09 PROCEDURE — 99239 HOSP IP/OBS DSCHRG MGMT >30: CPT | Performed by: INTERNAL MEDICINE

## 2018-11-09 PROCEDURE — 2700000000 HC OXYGEN THERAPY PER DAY

## 2018-11-09 PROCEDURE — 99223 1ST HOSP IP/OBS HIGH 75: CPT | Performed by: SURGERY

## 2018-11-09 PROCEDURE — 6360000002 HC RX W HCPCS: Performed by: STUDENT IN AN ORGANIZED HEALTH CARE EDUCATION/TRAINING PROGRAM

## 2018-11-09 PROCEDURE — 87581 M.PNEUMON DNA AMP PROBE: CPT

## 2018-11-09 PROCEDURE — 87486 CHLMYD PNEUM DNA AMP PROBE: CPT

## 2018-11-09 PROCEDURE — 82962 GLUCOSE BLOOD TEST: CPT

## 2018-11-09 PROCEDURE — A0425 GROUND MILEAGE: HCPCS

## 2018-11-09 PROCEDURE — A0428 BLS: HCPCS

## 2018-11-09 PROCEDURE — 2580000003 HC RX 258: Performed by: STUDENT IN AN ORGANIZED HEALTH CARE EDUCATION/TRAINING PROGRAM

## 2018-11-09 PROCEDURE — 87633 RESP VIRUS 12-25 TARGETS: CPT

## 2018-11-09 PROCEDURE — 6360000002 HC RX W HCPCS: Performed by: INTERNAL MEDICINE

## 2018-11-09 PROCEDURE — 6370000000 HC RX 637 (ALT 250 FOR IP): Performed by: INTERNAL MEDICINE

## 2018-11-09 PROCEDURE — 2060000000 HC ICU INTERMEDIATE R&B

## 2018-11-09 PROCEDURE — 94640 AIRWAY INHALATION TREATMENT: CPT

## 2018-11-09 PROCEDURE — 87798 DETECT AGENT NOS DNA AMP: CPT

## 2018-11-09 PROCEDURE — 94660 CPAP INITIATION&MGMT: CPT

## 2018-11-09 RX ORDER — OXYBUTYNIN CHLORIDE 5 MG/1
5 TABLET, EXTENDED RELEASE ORAL NIGHTLY
Status: DISCONTINUED | OUTPATIENT
Start: 2018-11-09 | End: 2018-11-14 | Stop reason: HOSPADM

## 2018-11-09 RX ORDER — DEXTROSE MONOHYDRATE 25 G/50ML
12.5 INJECTION, SOLUTION INTRAVENOUS PRN
Status: DISCONTINUED | OUTPATIENT
Start: 2018-11-09 | End: 2018-11-14 | Stop reason: HOSPADM

## 2018-11-09 RX ORDER — ONDANSETRON 2 MG/ML
4 INJECTION INTRAMUSCULAR; INTRAVENOUS EVERY 6 HOURS PRN
Status: DISCONTINUED | OUTPATIENT
Start: 2018-11-09 | End: 2018-11-09

## 2018-11-09 RX ORDER — DEXTROSE MONOHYDRATE 50 MG/ML
100 INJECTION, SOLUTION INTRAVENOUS PRN
Status: DISCONTINUED | OUTPATIENT
Start: 2018-11-09 | End: 2018-11-14 | Stop reason: HOSPADM

## 2018-11-09 RX ORDER — METOCLOPRAMIDE 5 MG/1
5 TABLET ORAL SEE ADMIN INSTRUCTIONS
Status: DISCONTINUED | OUTPATIENT
Start: 2018-11-09 | End: 2018-11-09 | Stop reason: SDUPTHER

## 2018-11-09 RX ORDER — FLUTICASONE PROPIONATE 50 MCG
2 SPRAY, SUSPENSION (ML) NASAL 2 TIMES DAILY
Status: DISCONTINUED | OUTPATIENT
Start: 2018-11-09 | End: 2018-11-14 | Stop reason: HOSPADM

## 2018-11-09 RX ORDER — HYDROCODONE BITARTRATE AND ACETAMINOPHEN 5; 325 MG/1; MG/1
1 TABLET ORAL EVERY 8 HOURS PRN
Status: DISCONTINUED | OUTPATIENT
Start: 2018-11-09 | End: 2018-11-11

## 2018-11-09 RX ORDER — HYDRALAZINE HYDROCHLORIDE 50 MG/1
100 TABLET, FILM COATED ORAL 3 TIMES DAILY
Status: DISCONTINUED | OUTPATIENT
Start: 2018-11-09 | End: 2018-11-14 | Stop reason: HOSPADM

## 2018-11-09 RX ORDER — SODIUM CHLORIDE 0.9 % (FLUSH) 0.9 %
10 SYRINGE (ML) INJECTION PRN
Status: DISCONTINUED | OUTPATIENT
Start: 2018-11-09 | End: 2018-11-14 | Stop reason: HOSPADM

## 2018-11-09 RX ORDER — MONTELUKAST SODIUM 10 MG/1
10 TABLET ORAL NIGHTLY
Status: DISCONTINUED | OUTPATIENT
Start: 2018-11-09 | End: 2018-11-14 | Stop reason: HOSPADM

## 2018-11-09 RX ORDER — AMOXICILLIN AND CLAVULANATE POTASSIUM 500; 125 MG/1; MG/1
1 TABLET, FILM COATED ORAL 3 TIMES DAILY
Status: DISCONTINUED | OUTPATIENT
Start: 2018-11-09 | End: 2018-11-14 | Stop reason: HOSPADM

## 2018-11-09 RX ORDER — METOPROLOL SUCCINATE 25 MG/1
25 TABLET, EXTENDED RELEASE ORAL DAILY
Status: DISCONTINUED | OUTPATIENT
Start: 2018-11-09 | End: 2018-11-14 | Stop reason: HOSPADM

## 2018-11-09 RX ORDER — HYDROCODONE BITARTRATE AND ACETAMINOPHEN 5; 325 MG/1; MG/1
1 TABLET ORAL EVERY 8 HOURS PRN
Qty: 15 TABLET | Refills: 0 | Status: ON HOLD | OUTPATIENT
Start: 2018-11-09 | End: 2018-11-14 | Stop reason: HOSPADM

## 2018-11-09 RX ORDER — METOCLOPRAMIDE 10 MG/1
10 TABLET ORAL NIGHTLY
Status: DISCONTINUED | OUTPATIENT
Start: 2018-11-09 | End: 2018-11-14 | Stop reason: HOSPADM

## 2018-11-09 RX ORDER — ALBUTEROL SULFATE 2.5 MG/3ML
2.5 SOLUTION RESPIRATORY (INHALATION) EVERY 6 HOURS PRN
Status: DISCONTINUED | OUTPATIENT
Start: 2018-11-09 | End: 2018-11-14 | Stop reason: HOSPADM

## 2018-11-09 RX ORDER — BUMETANIDE 1 MG/1
1 TABLET ORAL SEE ADMIN INSTRUCTIONS
Status: DISCONTINUED | OUTPATIENT
Start: 2018-11-09 | End: 2018-11-14 | Stop reason: HOSPADM

## 2018-11-09 RX ORDER — LIDOCAINE 50 MG/G
OINTMENT TOPICAL PRN
Status: DISCONTINUED | OUTPATIENT
Start: 2018-11-09 | End: 2018-11-14 | Stop reason: HOSPADM

## 2018-11-09 RX ORDER — OLOPATADINE HYDROCHLORIDE 2 MG/ML
1 SOLUTION/ DROPS OPHTHALMIC DAILY
Status: DISCONTINUED | OUTPATIENT
Start: 2018-11-09 | End: 2018-11-14 | Stop reason: HOSPADM

## 2018-11-09 RX ORDER — NICOTINE POLACRILEX 4 MG
15 LOZENGE BUCCAL PRN
Status: DISCONTINUED | OUTPATIENT
Start: 2018-11-09 | End: 2018-11-14 | Stop reason: HOSPADM

## 2018-11-09 RX ORDER — DICYCLOMINE HYDROCHLORIDE 10 MG/1
10 CAPSULE ORAL 2 TIMES DAILY
Status: DISCONTINUED | OUTPATIENT
Start: 2018-11-09 | End: 2018-11-14 | Stop reason: HOSPADM

## 2018-11-09 RX ORDER — METOCLOPRAMIDE 5 MG/1
5 TABLET ORAL
Status: DISCONTINUED | OUTPATIENT
Start: 2018-11-10 | End: 2018-11-14 | Stop reason: HOSPADM

## 2018-11-09 RX ORDER — ASPIRIN 81 MG/1
1 TABLET, CHEWABLE ORAL DAILY
Status: DISCONTINUED | OUTPATIENT
Start: 2018-11-10 | End: 2018-11-09

## 2018-11-09 RX ORDER — PREDNISONE 10 MG/1
15 TABLET ORAL DAILY
Status: DISCONTINUED | OUTPATIENT
Start: 2018-11-10 | End: 2018-11-14 | Stop reason: HOSPADM

## 2018-11-09 RX ORDER — SIMVASTATIN 20 MG
20 TABLET ORAL NIGHTLY
Status: DISCONTINUED | OUTPATIENT
Start: 2018-11-09 | End: 2018-11-14 | Stop reason: HOSPADM

## 2018-11-09 RX ORDER — DULOXETIN HYDROCHLORIDE 60 MG/1
60 CAPSULE, DELAYED RELEASE ORAL DAILY
Status: DISCONTINUED | OUTPATIENT
Start: 2018-11-09 | End: 2018-11-14 | Stop reason: HOSPADM

## 2018-11-09 RX ORDER — CLOTRIMAZOLE AND BETAMETHASONE DIPROPIONATE 10; .64 MG/G; MG/G
CREAM TOPICAL 2 TIMES DAILY
Status: DISCONTINUED | OUTPATIENT
Start: 2018-11-09 | End: 2018-11-14 | Stop reason: HOSPADM

## 2018-11-09 RX ORDER — DIPHENOXYLATE HYDROCHLORIDE AND ATROPINE SULFATE 2.5; .025 MG/1; MG/1
1 TABLET ORAL 4 TIMES DAILY PRN
Status: DISCONTINUED | OUTPATIENT
Start: 2018-11-09 | End: 2018-11-14 | Stop reason: HOSPADM

## 2018-11-09 RX ORDER — GABAPENTIN 300 MG/1
600 CAPSULE ORAL 3 TIMES DAILY
Status: DISCONTINUED | OUTPATIENT
Start: 2018-11-09 | End: 2018-11-14 | Stop reason: HOSPADM

## 2018-11-09 RX ORDER — PREDNISONE 1 MG/1
5 TABLET ORAL SEE ADMIN INSTRUCTIONS
Status: DISCONTINUED | OUTPATIENT
Start: 2018-11-09 | End: 2018-11-09

## 2018-11-09 RX ORDER — SODIUM CHLORIDE 0.9 % (FLUSH) 0.9 %
10 SYRINGE (ML) INJECTION EVERY 12 HOURS SCHEDULED
Status: DISCONTINUED | OUTPATIENT
Start: 2018-11-09 | End: 2018-11-14 | Stop reason: HOSPADM

## 2018-11-09 RX ORDER — ISOSORBIDE MONONITRATE 60 MG/1
60 TABLET, EXTENDED RELEASE ORAL DAILY
Status: DISCONTINUED | OUTPATIENT
Start: 2018-11-09 | End: 2018-11-14 | Stop reason: HOSPADM

## 2018-11-09 RX ORDER — QUETIAPINE FUMARATE 25 MG/1
25 TABLET, FILM COATED ORAL NIGHTLY
Status: DISCONTINUED | OUTPATIENT
Start: 2018-11-09 | End: 2018-11-09

## 2018-11-09 RX ORDER — PANTOPRAZOLE SODIUM 40 MG/1
40 TABLET, DELAYED RELEASE ORAL 2 TIMES DAILY
Status: DISCONTINUED | OUTPATIENT
Start: 2018-11-09 | End: 2018-11-14 | Stop reason: HOSPADM

## 2018-11-09 RX ORDER — URSODIOL 300 MG/1
300 CAPSULE ORAL 2 TIMES DAILY
Status: DISCONTINUED | OUTPATIENT
Start: 2018-11-09 | End: 2018-11-14 | Stop reason: HOSPADM

## 2018-11-09 RX ADMIN — METOPROLOL SUCCINATE 25 MG: 25 TABLET, FILM COATED, EXTENDED RELEASE ORAL at 09:00

## 2018-11-09 RX ADMIN — FLUTICASONE PROPIONATE 2 SPRAY: 50 SPRAY, METERED NASAL at 22:32

## 2018-11-09 RX ADMIN — MONTELUKAST SODIUM 10 MG: 10 TABLET, FILM COATED ORAL at 22:29

## 2018-11-09 RX ADMIN — URSODIOL 300 MG: 300 CAPSULE ORAL at 09:00

## 2018-11-09 RX ADMIN — METOCLOPRAMIDE 5 MG: 10 TABLET ORAL at 12:36

## 2018-11-09 RX ADMIN — DULOXETINE HYDROCHLORIDE 60 MG: 60 CAPSULE, DELAYED RELEASE ORAL at 09:01

## 2018-11-09 RX ADMIN — AMOXICILLIN AND CLAVULANATE POTASSIUM 1 TABLET: 500; 125 TABLET, FILM COATED ORAL at 22:30

## 2018-11-09 RX ADMIN — DICYCLOMINE HYDROCHLORIDE 10 MG: 10 CAPSULE ORAL at 09:01

## 2018-11-09 RX ADMIN — HYDROCODONE BITARTRATE AND ACETAMINOPHEN 1 TABLET: 5; 325 TABLET ORAL at 16:27

## 2018-11-09 RX ADMIN — SIMVASTATIN 20 MG: 20 TABLET, FILM COATED ORAL at 22:29

## 2018-11-09 RX ADMIN — ASPIRIN 81 MG 81 MG: 81 TABLET ORAL at 09:00

## 2018-11-09 RX ADMIN — HYDROCODONE BITARTRATE AND ACETAMINOPHEN 1 TABLET: 5; 325 TABLET ORAL at 22:31

## 2018-11-09 RX ADMIN — HYDROCODONE BITARTRATE AND ACETAMINOPHEN 1 TABLET: 5; 325 TABLET ORAL at 09:01

## 2018-11-09 RX ADMIN — MOMETASONE FUROATE AND FORMOTEROL FUMARATE DIHYDRATE 2 PUFF: 200; 5 AEROSOL RESPIRATORY (INHALATION) at 11:42

## 2018-11-09 RX ADMIN — AMOXICILLIN AND CLAVULANATE POTASSIUM 1 TABLET: 500; 125 TABLET, FILM COATED ORAL at 09:00

## 2018-11-09 RX ADMIN — FLUTICASONE PROPIONATE 2 SPRAY: 50 SPRAY, METERED NASAL at 09:02

## 2018-11-09 RX ADMIN — ISOSORBIDE MONONITRATE 60 MG: 60 TABLET, EXTENDED RELEASE ORAL at 09:01

## 2018-11-09 RX ADMIN — ENOXAPARIN SODIUM 40 MG: 40 INJECTION SUBCUTANEOUS at 22:28

## 2018-11-09 RX ADMIN — METOCLOPRAMIDE 10 MG: 5 TABLET ORAL at 22:29

## 2018-11-09 RX ADMIN — DULOXETINE HYDROCHLORIDE 60 MG: 60 CAPSULE, DELAYED RELEASE ORAL at 22:30

## 2018-11-09 RX ADMIN — DICYCLOMINE HYDROCHLORIDE 10 MG: 10 CAPSULE ORAL at 22:30

## 2018-11-09 RX ADMIN — AMOXICILLIN AND CLAVULANATE POTASSIUM 1 TABLET: 500; 125 TABLET, FILM COATED ORAL at 15:00

## 2018-11-09 RX ADMIN — DIPHENHYDRAMINE HYDROCHLORIDE 25 MG: 50 INJECTION, SOLUTION INTRAMUSCULAR; INTRAVENOUS at 16:56

## 2018-11-09 RX ADMIN — OLOPATADINE HYDROCHLORIDE 1 DROP: 2 SOLUTION/ DROPS OPHTHALMIC at 22:33

## 2018-11-09 RX ADMIN — CETIRIZINE HYDROCHLORIDE 10 MG: 10 TABLET, FILM COATED ORAL at 09:01

## 2018-11-09 RX ADMIN — INSULIN LISPRO 1 UNITS: 100 INJECTION, SOLUTION INTRAVENOUS; SUBCUTANEOUS at 22:37

## 2018-11-09 RX ADMIN — DIPHENHYDRAMINE HYDROCHLORIDE 25 MG: 50 INJECTION, SOLUTION INTRAMUSCULAR; INTRAVENOUS at 09:01

## 2018-11-09 RX ADMIN — HYDRALAZINE HYDROCHLORIDE 100 MG: 50 TABLET, FILM COATED ORAL at 09:00

## 2018-11-09 RX ADMIN — PANTOPRAZOLE SODIUM 40 MG: 40 TABLET, DELAYED RELEASE ORAL at 22:31

## 2018-11-09 RX ADMIN — METOPROLOL SUCCINATE 25 MG: 25 TABLET, FILM COATED, EXTENDED RELEASE ORAL at 22:29

## 2018-11-09 RX ADMIN — GABAPENTIN 600 MG: 300 CAPSULE ORAL at 15:00

## 2018-11-09 RX ADMIN — OXYBUTYNIN CHLORIDE 5 MG: 5 TABLET, EXTENDED RELEASE ORAL at 22:30

## 2018-11-09 RX ADMIN — GABAPENTIN 600 MG: 300 CAPSULE ORAL at 22:30

## 2018-11-09 RX ADMIN — HYDRALAZINE HYDROCHLORIDE 100 MG: 50 TABLET, FILM COATED ORAL at 22:30

## 2018-11-09 RX ADMIN — SACUBITRIL AND VALSARTAN 1 TABLET: 24; 26 TABLET, FILM COATED ORAL at 09:01

## 2018-11-09 RX ADMIN — HYDRALAZINE HYDROCHLORIDE 100 MG: 50 TABLET, FILM COATED ORAL at 15:00

## 2018-11-09 RX ADMIN — GABAPENTIN 600 MG: 300 CAPSULE ORAL at 09:00

## 2018-11-09 RX ADMIN — Medication 10 ML: at 22:31

## 2018-11-09 RX ADMIN — ISOSORBIDE MONONITRATE 60 MG: 60 TABLET ORAL at 22:30

## 2018-11-09 RX ADMIN — OLOPATADINE HYDROCHLORIDE 1 DROP: 2 SOLUTION OPHTHALMIC at 08:59

## 2018-11-09 RX ADMIN — MICONAZOLE NITRATE: 20 CREAM TOPICAL at 22:32

## 2018-11-09 RX ADMIN — PANTOPRAZOLE SODIUM 40 MG: 40 TABLET, DELAYED RELEASE ORAL at 09:00

## 2018-11-09 RX ADMIN — SACUBITRIL AND VALSARTAN 1 TABLET: 24; 26 TABLET, FILM COATED ORAL at 22:29

## 2018-11-09 RX ADMIN — URSODIOL 300 MG: 300 CAPSULE ORAL at 22:31

## 2018-11-09 RX ADMIN — PREDNISONE 5 MG: 5 TABLET ORAL at 09:00

## 2018-11-09 RX ADMIN — METOCLOPRAMIDE 5 MG: 10 TABLET ORAL at 16:18

## 2018-11-09 RX ADMIN — CLOTRIMAZOLE AND BETAMETHASONE DIPROPIONATE: 10; .5 CREAM TOPICAL at 22:33

## 2018-11-09 ASSESSMENT — PAIN SCALES - GENERAL
PAINLEVEL_OUTOF10: 0
PAINLEVEL_OUTOF10: 4
PAINLEVEL_OUTOF10: 6
PAINLEVEL_OUTOF10: 10
PAINLEVEL_OUTOF10: 7
PAINLEVEL_OUTOF10: 3
PAINLEVEL_OUTOF10: 10
PAINLEVEL_OUTOF10: 0
PAINLEVEL_OUTOF10: 6

## 2018-11-09 ASSESSMENT — PAIN DESCRIPTION - DESCRIPTORS
DESCRIPTORS: ACHING;CRAMPING;CRUSHING
DESCRIPTORS: ACHING;DISCOMFORT;SHARP
DESCRIPTORS: ACHING;DISCOMFORT;SORE
DESCRIPTORS: SHARP

## 2018-11-09 ASSESSMENT — PAIN DESCRIPTION - ORIENTATION
ORIENTATION: RIGHT;LEFT;LOWER
ORIENTATION: RIGHT;LEFT
ORIENTATION: RIGHT

## 2018-11-09 ASSESSMENT — PAIN DESCRIPTION - PAIN TYPE
TYPE: ACUTE PAIN
TYPE: CHRONIC PAIN
TYPE: CHRONIC PAIN
TYPE: ACUTE PAIN

## 2018-11-09 ASSESSMENT — PAIN DESCRIPTION - ONSET
ONSET: ON-GOING

## 2018-11-09 ASSESSMENT — PAIN DESCRIPTION - LOCATION
LOCATION: BACK;LEG
LOCATION: TOE (COMMENT WHICH ONE);LEG

## 2018-11-09 ASSESSMENT — PAIN DESCRIPTION - FREQUENCY
FREQUENCY: INTERMITTENT

## 2018-11-09 ASSESSMENT — PAIN DESCRIPTION - PROGRESSION
CLINICAL_PROGRESSION: NOT CHANGED
CLINICAL_PROGRESSION: GRADUALLY IMPROVING

## 2018-11-09 NOTE — H&P
Wei Stoddard MD   sacubitril-valsartan (ENTRESTO) 24-26 MG per tablet Take 1 tablet by mouth 2 times daily 9/18/18  Yes Wei Stoddard MD   simvastatin (ZOCOR) 20 MG tablet Take 1 tablet by mouth nightly 9/18/18  Yes Wei Stoddard MD   DULoxetine (CYMBALTA) 60 MG extended release capsule TAKE ONE CAPSULE BY MOUTH TWICE A DAY 8/13/18 11/21/19 Yes Jared Saldaña,    Insulin Degludec (TRESIBA FLEXTOUCH) 200 UNIT/ML SOPN Inject 70 Units into the skin 2 times daily  Patient taking differently: Inject 60 Units into the skin 2 times daily  8/9/18  Yes Shabbir Verdin DO   dicyclomine (BENTYL) 20 MG tablet Take 10 mg by mouth 2 times daily   Yes Historical Provider, MD   Omega-3 Fatty Acids (FISH OIL OMEGA-3 PO) Take 1,000 mg by mouth daily    Yes Historical Provider, MD   ursodiol (ACTIGALL) 300 MG capsule Take 300 mg by mouth 2 times daily   Yes Historical Provider, MD   mometasone-formoterol (DULERA) 200-5 MCG/ACT inhaler Inhale 2 puffs into the lungs every 12 hours   Yes Historical Provider, MD   Multiple Vitamins-Minerals (THERAPEUTIC MULTIVITAMIN-MINERALS) tablet Take 1 tablet by mouth daily   Yes Historical Provider, MD   cetirizine (ZYRTEC) 10 MG tablet Take 10 mg by mouth daily   Yes Historical Provider, MD   pantoprazole (PROTONIX) 40 MG tablet TAKE 1 TABLET BY MOUTH TWICE A DAY 5/22/18  Yes Historical Provider, MD   montelukast (SINGULAIR) 10 MG tablet Take 10 mg by mouth nightly    Yes Historical Provider, MD   aspirin 81 MG chewable tablet TAKE 1 TABLET BY MOUTH DAILY 3/15/18  Yes Historical Provider, MD   fluticasone (FLONASE) 50 MCG/ACT nasal spray 1 spray by Nasal route 2 times daily  Patient taking differently: 2 sprays by Nasal route 2 times daily  11/8/17  Yes Nichol Zuniga MD   metoclopramide (REGLAN) 5 MG tablet Take 5 mg by mouth See Admin Instructions 1 tab daily with each meal and two tabs nightly    Historical Provider, MD   Nutritional Supplements (GLUCOSE MANAGEMENT) TABS Use bendaryl NOT listed as true allergy.     Michael Linder MD   Night Officer, overnight admitting doctor at [facility] call day time doctor   for questions after 7:30am    Covering for [facility] Service  If Qs please call [phone]  Electronically signed by Manju Liu MD on 11/8/2018 at 8:32 PM

## 2018-11-09 NOTE — PROGRESS NOTES
Asked patient if anyone could bring in her insulin pen from home. She said there was and she would have them bring it in tomorrow.

## 2018-11-09 NOTE — PROGRESS NOTES
Nurse to nurse given to Eli Chaney at this time.  Electronically signed by Denis Carpio RN on 11/9/2018 at 4:54 PM

## 2018-11-09 NOTE — CONSULTS
(peripheral vascular disease) with claudication ([facility] [address] 8/30/2017    Sleep apnea     uses BI PAP    Tobacco abuse     Tobacco abuse 10/11/2017    Tubular adenoma polyp of rectum     Urinary tract infection due to ESBL Klebsiella 03/08/2017    Ventral hernia      Past Surgical History:    Past Surgical History:   Procedure Laterality Date    BREAST SURGERY  2000    bilateral reduction    BRONCHIAL BRUSH BIOPSY  1/25/2013    Dr Bj Duran  04/20/2015    Dr. Osiris Acosta  12/2011     DR. Rachana Infante,  follows Dr Jeannine Orozco  11/15/2013    Dr Juliana Day    COLONOSCOPY  12/23/2016    Dr Dayanna Leroy adenoma & hyperplastic polyps, melanosis coli (repeat one year 12/2017)    CORONARY ARTERY BYPASS GRAFT      ECHO COMPLETE  10/9/2013         ENDOSCOPY, COLON, DIAGNOSTIC  04/18/2017    GALLBLADDER SURGERY      gallstones removed, [facility] 8/2017    HYSTERECTOMY      JOINT REPLACEMENT  2011    LEFT KNEE    KNEE SURGERY      left knee replacement    LAYER WOUND CLOSURE Left [id number]    [facility]  [facility]/8/2016    [facility]    POLYSOM[facility]GRAPHY  1/2016    [facility]    UPPER GASTROINTESTINAL ENDOSCOPY  12/20/12    UPPER GASTROINTESTINAL ENDOSCOPY  12/23/2016    Dr Oc Garnica UPPER GASTROINTESTINAL ENDOSCOPY  02/08/2017     Current Medications:     amoxicillin-clavulanate  1 tablet Oral TID    aspirin  81 mg Oral Daily    cetirizine  10 mg Oral Daily    dicyclomine  10 mg Oral BID    DULoxetine  60 mg Oral Daily    fluticasone  2 spray Nasal BID    gabapentin  600 mg Oral TID    hydrALAZINE  100 mg Oral TID    insulin lispro  35 Units Subcutaneous TID WC    Insulin Degludec  60 Units Subcutaneous BID    isosorbide mononitrate  60 mg Oral Daily    metoprolol succinate  25 mg Oral BID    mometasone-formoterol  2 puff Inhalation Q12H    montelukast  10 mg Oral Nightly    olopatadine  1 drop Both Eyes Daily    oxybutynin  5 mg lateral and oblique projections of 3 images, 3 views. History gangrene in the hallux. FINDINGS: There are normal cortical bone contour and trabecular bone texture for all phalanxes of all toes and for the first, second and fourth metatarsal bones. Presence of old chronic periostitis in the shaft of the third and fourth metatarsal bones. All interphalangeal joints and metatarsal-phalangeal joints are preserved . The intertarsal and tarsal/metatarsal tarsal bones or joints are preserved. The tarsal bones appear unremarkable. Vertical continuity spurs are seen for the Achilles tendon and plantar fascia attachments. 1. No significant degree of soft tissue swelling is seen in the left foot or air within the soft tissues. 2. No aggressive osteolysis or periosteal reaction observed. Xr Toe Left (min 2 Views)    Result Date: 10/24/2018  Patient MRN: [id number] : 1955 Age:  60 years Gender: Female Order Date: 10/24/2018 4:45 PM Exam: XR TOE LEFT (MIN 2 VIEWS) Number of Images: 3 views Indication:  Fall, injury, pain. COMPARISON:  None. FINDINGS: No definite acute fracture or dislocation is evident. Follow up radiographs in 7-14 days may be helpful should there be persistent clinical suspicion of possible acute, initially radiographically occult osseous injury or other pathology. NO ACUTE OSSEOUS ABNORMALITY APPRECIABLE. ? FOLLOW UP ADVISED SHOULD SYMPTOMS PERSIST. Xr Toe Right (min 2 Views)    Result Date: 10/24/2018  Patient MRN: [id number] : 1955 Age:  60 years Gender: Female Order Date: 10/24/2018 4:45 PM Exam: XR TOE RIGHT (MIN 2 VIEWS) Number of Images: 3 views Indication:  Fall, injury, pain. COMPARISON:  None. FINDINGS: No definite acute fracture or dislocation is evident. Follow up radiographs in 7-14 days may be helpful should there be persistent clinical suspicion of possible acute, initially radiographically occult osseous injury or other pathology.      NO ACUTE OSSEOUS ABNORMALITY

## 2018-11-09 NOTE — PLAN OF CARE
Problem: Skin Integrity:  Goal: Will show no infection signs and symptoms  Will show no infection signs and symptoms  Outcome: Met This Shift

## 2018-11-10 ENCOUNTER — APPOINTMENT (OUTPATIENT)
Dept: GENERAL RADIOLOGY | Age: 63
DRG: 271 | End: 2018-11-10
Attending: STUDENT IN AN ORGANIZED HEALTH CARE EDUCATION/TRAINING PROGRAM
Payer: MEDICARE

## 2018-11-10 LAB
ANION GAP SERPL CALCULATED.3IONS-SCNC: 11 MMOL/L (ref 7–16)
BUN BLDV-MCNC: 18 MG/DL (ref 8–23)
CALCIUM SERPL-MCNC: 8.4 MG/DL (ref 8.6–10.2)
CHLORIDE BLD-SCNC: 106 MMOL/L (ref 98–107)
CO2: 28 MMOL/L (ref 22–29)
CREAT SERPL-MCNC: 1 MG/DL (ref 0.5–1)
GFR AFRICAN AMERICAN: >60
GFR NON-AFRICAN AMERICAN: >60 ML/MIN/1.73
GLUCOSE BLD-MCNC: 83 MG/DL (ref 74–99)
HCT VFR BLD CALC: 46.1 % (ref 34–48)
HEMOGLOBIN: 13.8 G/DL (ref 11.5–15.5)
MCH RBC QN AUTO: 30.5 PG (ref 26–35)
MCHC RBC AUTO-ENTMCNC: 29.9 % (ref 32–34.5)
MCV RBC AUTO: 101.8 FL (ref 80–99.9)
METER GLUCOSE: 100 MG/DL (ref 74–99)
METER GLUCOSE: 132 MG/DL (ref 74–99)
METER GLUCOSE: 187 MG/DL (ref 74–99)
METER GLUCOSE: 81 MG/DL (ref 74–99)
PDW BLD-RTO: 13.2 FL (ref 11.5–15)
PLATELET # BLD: 234 E9/L (ref 130–450)
PMV BLD AUTO: 11.2 FL (ref 7–12)
POTASSIUM REFLEX MAGNESIUM: 4.1 MMOL/L (ref 3.5–5)
RBC # BLD: 4.53 E12/L (ref 3.5–5.5)
SODIUM BLD-SCNC: 145 MMOL/L (ref 132–146)
WBC # BLD: 8.2 E9/L (ref 4.5–11.5)

## 2018-11-10 PROCEDURE — 6370000000 HC RX 637 (ALT 250 FOR IP): Performed by: INTERNAL MEDICINE

## 2018-11-10 PROCEDURE — 6360000002 HC RX W HCPCS: Performed by: STUDENT IN AN ORGANIZED HEALTH CARE EDUCATION/TRAINING PROGRAM

## 2018-11-10 PROCEDURE — 94660 CPAP INITIATION&MGMT: CPT

## 2018-11-10 PROCEDURE — 73562 X-RAY EXAM OF KNEE 3: CPT

## 2018-11-10 PROCEDURE — 85027 COMPLETE CBC AUTOMATED: CPT

## 2018-11-10 PROCEDURE — 99222 1ST HOSP IP/OBS MODERATE 55: CPT | Performed by: SURGERY

## 2018-11-10 PROCEDURE — 82962 GLUCOSE BLOOD TEST: CPT

## 2018-11-10 PROCEDURE — 2580000003 HC RX 258: Performed by: STUDENT IN AN ORGANIZED HEALTH CARE EDUCATION/TRAINING PROGRAM

## 2018-11-10 PROCEDURE — 36415 COLL VENOUS BLD VENIPUNCTURE: CPT

## 2018-11-10 PROCEDURE — 80048 BASIC METABOLIC PNL TOTAL CA: CPT

## 2018-11-10 PROCEDURE — 2060000000 HC ICU INTERMEDIATE R&B

## 2018-11-10 PROCEDURE — 6360000002 HC RX W HCPCS: Performed by: INTERNAL MEDICINE

## 2018-11-10 RX ORDER — FAMOTIDINE 20 MG/1
20 TABLET, FILM COATED ORAL 2 TIMES DAILY
Status: DISCONTINUED | OUTPATIENT
Start: 2018-11-10 | End: 2018-11-14 | Stop reason: HOSPADM

## 2018-11-10 RX ORDER — DIPHENHYDRAMINE HYDROCHLORIDE 50 MG/ML
25 INJECTION INTRAMUSCULAR; INTRAVENOUS EVERY 6 HOURS
Status: DISCONTINUED | OUTPATIENT
Start: 2018-11-10 | End: 2018-11-11

## 2018-11-10 RX ADMIN — URSODIOL 300 MG: 300 CAPSULE ORAL at 09:05

## 2018-11-10 RX ADMIN — HYDRALAZINE HYDROCHLORIDE 100 MG: 50 TABLET, FILM COATED ORAL at 20:42

## 2018-11-10 RX ADMIN — DIPHENHYDRAMINE HYDROCHLORIDE 25 MG: 50 INJECTION, SOLUTION INTRAMUSCULAR; INTRAVENOUS at 20:43

## 2018-11-10 RX ADMIN — FAMOTIDINE 20 MG: 20 TABLET ORAL at 20:43

## 2018-11-10 RX ADMIN — OLOPATADINE HYDROCHLORIDE 1 DROP: 2 SOLUTION/ DROPS OPHTHALMIC at 09:06

## 2018-11-10 RX ADMIN — OXYBUTYNIN CHLORIDE 5 MG: 5 TABLET, EXTENDED RELEASE ORAL at 20:43

## 2018-11-10 RX ADMIN — DIPHENHYDRAMINE HYDROCHLORIDE 25 MG: 50 INJECTION, SOLUTION INTRAMUSCULAR; INTRAVENOUS at 11:43

## 2018-11-10 RX ADMIN — HYDROCODONE BITARTRATE AND ACETAMINOPHEN 1 TABLET: 5; 325 TABLET ORAL at 15:00

## 2018-11-10 RX ADMIN — PANTOPRAZOLE SODIUM 40 MG: 40 TABLET, DELAYED RELEASE ORAL at 09:05

## 2018-11-10 RX ADMIN — Medication 10 ML: at 09:05

## 2018-11-10 RX ADMIN — FLUTICASONE PROPIONATE 2 SPRAY: 50 SPRAY, METERED NASAL at 20:53

## 2018-11-10 RX ADMIN — AMOXICILLIN AND CLAVULANATE POTASSIUM 1 TABLET: 500; 125 TABLET, FILM COATED ORAL at 13:37

## 2018-11-10 RX ADMIN — DIPHENHYDRAMINE HYDROCHLORIDE 25 MG: 50 INJECTION, SOLUTION INTRAMUSCULAR; INTRAVENOUS at 16:38

## 2018-11-10 RX ADMIN — MICONAZOLE NITRATE: 20 CREAM TOPICAL at 09:06

## 2018-11-10 RX ADMIN — DICYCLOMINE HYDROCHLORIDE 10 MG: 10 CAPSULE ORAL at 20:43

## 2018-11-10 RX ADMIN — SACUBITRIL AND VALSARTAN 1 TABLET: 24; 26 TABLET, FILM COATED ORAL at 20:43

## 2018-11-10 RX ADMIN — MICONAZOLE NITRATE: 20 CREAM TOPICAL at 20:43

## 2018-11-10 RX ADMIN — HYDRALAZINE HYDROCHLORIDE 100 MG: 50 TABLET, FILM COATED ORAL at 09:04

## 2018-11-10 RX ADMIN — SACUBITRIL AND VALSARTAN 1 TABLET: 24; 26 TABLET, FILM COATED ORAL at 09:05

## 2018-11-10 RX ADMIN — URSODIOL 300 MG: 300 CAPSULE ORAL at 20:43

## 2018-11-10 RX ADMIN — GABAPENTIN 600 MG: 300 CAPSULE ORAL at 20:42

## 2018-11-10 RX ADMIN — METOPROLOL SUCCINATE 25 MG: 25 TABLET, FILM COATED, EXTENDED RELEASE ORAL at 09:04

## 2018-11-10 RX ADMIN — FAMOTIDINE 20 MG: 20 TABLET ORAL at 11:44

## 2018-11-10 RX ADMIN — METOCLOPRAMIDE 5 MG: 5 TABLET ORAL at 16:37

## 2018-11-10 RX ADMIN — INSULIN LISPRO 2 UNITS: 100 INJECTION, SOLUTION INTRAVENOUS; SUBCUTANEOUS at 16:37

## 2018-11-10 RX ADMIN — CLOTRIMAZOLE AND BETAMETHASONE DIPROPIONATE: 10; .5 CREAM TOPICAL at 20:43

## 2018-11-10 RX ADMIN — HYDROCODONE BITARTRATE AND ACETAMINOPHEN 1 TABLET: 5; 325 TABLET ORAL at 23:13

## 2018-11-10 RX ADMIN — METOCLOPRAMIDE 5 MG: 5 TABLET ORAL at 06:40

## 2018-11-10 RX ADMIN — Medication 10 ML: at 20:43

## 2018-11-10 RX ADMIN — AMOXICILLIN AND CLAVULANATE POTASSIUM 1 TABLET: 500; 125 TABLET, FILM COATED ORAL at 20:42

## 2018-11-10 RX ADMIN — ISOSORBIDE MONONITRATE 60 MG: 60 TABLET ORAL at 09:04

## 2018-11-10 RX ADMIN — DULOXETINE HYDROCHLORIDE 60 MG: 60 CAPSULE, DELAYED RELEASE ORAL at 09:04

## 2018-11-10 RX ADMIN — CLOTRIMAZOLE AND BETAMETHASONE DIPROPIONATE: 10; .5 CREAM TOPICAL at 09:06

## 2018-11-10 RX ADMIN — FLUTICASONE PROPIONATE 2 SPRAY: 50 SPRAY, METERED NASAL at 09:06

## 2018-11-10 RX ADMIN — SIMVASTATIN 20 MG: 20 TABLET, FILM COATED ORAL at 20:43

## 2018-11-10 RX ADMIN — DICYCLOMINE HYDROCHLORIDE 10 MG: 10 CAPSULE ORAL at 09:04

## 2018-11-10 RX ADMIN — HYDROCODONE BITARTRATE AND ACETAMINOPHEN 1 TABLET: 5; 325 TABLET ORAL at 06:40

## 2018-11-10 RX ADMIN — GABAPENTIN 600 MG: 300 CAPSULE ORAL at 13:37

## 2018-11-10 RX ADMIN — AMOXICILLIN AND CLAVULANATE POTASSIUM 1 TABLET: 500; 125 TABLET, FILM COATED ORAL at 09:05

## 2018-11-10 RX ADMIN — HYDRALAZINE HYDROCHLORIDE 100 MG: 50 TABLET, FILM COATED ORAL at 13:37

## 2018-11-10 RX ADMIN — MOMETASONE FUROATE AND FORMOTEROL FUMARATE DIHYDRATE 2 PUFF: 200; 5 AEROSOL RESPIRATORY (INHALATION) at 20:43

## 2018-11-10 RX ADMIN — METOCLOPRAMIDE 10 MG: 5 TABLET ORAL at 20:43

## 2018-11-10 RX ADMIN — ENOXAPARIN SODIUM 40 MG: 40 INJECTION SUBCUTANEOUS at 09:05

## 2018-11-10 RX ADMIN — GABAPENTIN 600 MG: 300 CAPSULE ORAL at 09:04

## 2018-11-10 RX ADMIN — PREDNISONE 15 MG: 10 TABLET ORAL at 09:04

## 2018-11-10 RX ADMIN — MOMETASONE FUROATE AND FORMOTEROL FUMARATE DIHYDRATE 2 PUFF: 200; 5 AEROSOL RESPIRATORY (INHALATION) at 09:06

## 2018-11-10 RX ADMIN — MONTELUKAST SODIUM 10 MG: 10 TABLET, FILM COATED ORAL at 20:42

## 2018-11-10 ASSESSMENT — PAIN SCALES - GENERAL
PAINLEVEL_OUTOF10: 10
PAINLEVEL_OUTOF10: 7
PAINLEVEL_OUTOF10: 0
PAINLEVEL_OUTOF10: 10
PAINLEVEL_OUTOF10: 0
PAINLEVEL_OUTOF10: 0

## 2018-11-10 ASSESSMENT — PAIN DESCRIPTION - ORIENTATION
ORIENTATION: RIGHT;LEFT
ORIENTATION: RIGHT;LEFT

## 2018-11-10 ASSESSMENT — PAIN DESCRIPTION - ONSET
ONSET: ON-GOING
ONSET: ON-GOING

## 2018-11-10 ASSESSMENT — PAIN DESCRIPTION - LOCATION
LOCATION: LEG
LOCATION: LEG

## 2018-11-10 ASSESSMENT — PAIN DESCRIPTION - PAIN TYPE
TYPE: ACUTE PAIN
TYPE: ACUTE PAIN

## 2018-11-10 ASSESSMENT — PAIN DESCRIPTION - FREQUENCY: FREQUENCY: INTERMITTENT

## 2018-11-10 ASSESSMENT — PAIN DESCRIPTION - DESCRIPTORS
DESCRIPTORS: ACHING;DISCOMFORT;SHARP
DESCRIPTORS: ACHING;DISCOMFORT;SORE

## 2018-11-10 ASSESSMENT — PAIN DESCRIPTION - PROGRESSION: CLINICAL_PROGRESSION: NOT CHANGED

## 2018-11-10 NOTE — PROGRESS NOTES
24 hours) at 11/10/18 0926  Last data filed at 11/10/18 0643   Gross per 24 hour   Intake                0 ml   Output              450 ml   Net             -450 ml       Exam:    /64   Pulse 68   Temp 97.1 °F (36.2 °C) (Temporal)   Resp 15   Ht 5' 2\" (1.575 m)   Wt 262 lb 5.6 oz (119 kg)   LMP 01/01/1990   SpO2 97%   BMI 47.98 kg/m²     General appearance:  No apparent distress, appears stated age and cooperative. Obese   HEENT:  Normal cephalic, atraumatic without obvious deformity. Pupils equal, round, and reactive to light. Extra ocular muscles intact. Conjunctivae/corneas clear. Neck: Supple, with full range of motion. No jugular venous distention. Trachea midline. Respiratory:  Normal respiratory effort. Clear to auscultation, bilaterally without Rales/Wheezes/Rhonchi. Cardiovascular:  Regular rate and rhythm with normal S1/S2 without murmurs, rubs or gallops. Abdomen: Soft, non-tender, non-distended with normal bowel sounds. Musculoskeletal:  No clubbing, cyanosis or edema bilaterally. Full range of motion without deformity. L great toe with white discoloration at tip and violaceous discoloration of toe but no edema or redness  Skin: Skin color, texture, turgor normal.  No rashes or lesions. Neurologic:  Neurovascularly intact without any focal sensory/motor deficits. Cranial nerves: II-XII intact, grossly non-focal.  Psychiatric:  Alert and oriented, thought content appropriate, normal insight      Labs:   Recent Labs      11/08/18   1705  11/10/18   0606   WBC  11.8*  8.2   HGB  14.4  13.8   HCT  46.0  46.1   PLT  242  234     Recent Labs      11/08/18   1705  11/10/18   0606   NA  138  145   K  4.6  4.1   CL  101  106   CO2  27  28   BUN  21  18   CREATININE  0.9  1.0   CALCIUM  8.6  8.4*     Recent Labs      11/08/18   1705   AST  24   ALT  34*   BILITOT  0.3   ALKPHOS  85     No results for input(s): INR in the last 72 hours.   No results for input(s): Luberta Moll in the last 72 hours. Imaging:  XR KNEE RIGHT (3 VIEWS)    (Results Pending)         Assessment/Plan:  Active Hospital Problems    Diagnosis Date Noted    Gangrene of toe of left foot ([address] Jacob Click 11/08/2018     · DM2  · Hx of CAD  · Hx COPD   HTN  · GERD  · Colitis  · Mild leukocytosis   · R knee pain     Plan:  · To be seen by vascular surgery - possible surgery on Monday  · R knee x-ray  · If abnormalities, will consult ortho  · SSI   · Will start benadryl and pepcid for neck swelling  · Continue augmentin and prednisone      · Body mass index is 47.98 kg/m². · DVT Prophylaxis  · Lovenox    · Diet  DIET CARB CONTROL;    · Code Status  · Full Code    · PT/OT Eval Status  · Consult     · Disposition  · Likely home at [address] [employer] - Chief Hospitalist  Please contact me through perfect serve    NOTE: This report was transcribed using voice recognition software. Every effort was made to ensure accuracy; however, inadvertent computerized transcription errors may be present.

## 2018-11-10 NOTE — PROGRESS NOTES
Lab Results   Component Value Date     11/10/2018    K 4.1 11/10/2018     11/10/2018    CO2 28 11/10/2018    BUN 18 11/10/2018    CREATININE 1.0 11/10/2018    GFRAA >60 11/10/2018    LABGLOM >60 11/10/2018    GLUCOSE 83 11/10/2018    GLUCOSE 181 12/01/2011    PROT 6.8 11/08/2018    LABALBU 3.9 11/08/2018    LABALBU 5.2 12/01/2011    CALCIUM 8.4 11/10/2018    BILITOT 0.3 11/08/2018    ALKPHOS 85 11/08/2018    AST 24 11/08/2018    ALT 34 11/08/2018     BMP:    Lab Results   Component Value Date     11/10/2018    K 4.1 11/10/2018     11/10/2018    CO2 28 11/10/2018    BUN 18 11/10/2018    LABALBU 3.9 11/08/2018    LABALBU 5.2 12/01/2011    CREATININE 1.0 11/10/2018    CALCIUM 8.4 11/10/2018    GFRAA >60 11/10/2018    LABGLOM >60 11/10/2018    GLUCOSE 83 11/10/2018    GLUCOSE 181 12/01/2011     BUN/Creatinine:    Lab Results   Component Value Date    BUN 18 11/10/2018    CREATININE 1.0 11/10/2018     Uric Acid:  No results found for: URICACID  PT/INR:    Lab Results   Component Value Date    PROTIME 12.2 07/26/2018    PROTIME 12.6 04/22/2011    INR 1.1 07/26/2018     Warfarin PT/INR:    Lab Results   Component Value Date    PROTIME 12.2 07/26/2018    PROTIME 12.6 04/22/2011    INR 1.1 07/26/2018     PTT:    Lab Results   Component Value Date    APTT 37.4 07/26/2018   [APTT}  U/A:    Lab Results   Component Value Date    NITRU POSITIVE 07/07/2018    COLORU Yellow 07/07/2018    PHUR 6.0 07/07/2018    LABCAST RARE  01/23/2014    WBCUA 0-1 07/07/2018    WBCUA 0-1 04/25/2011    RBCUA NONE 07/07/2018    RBCUA 0-1 01/25/2014    BACTERIA MANY 07/07/2018    CLARITYU Clear 07/07/2018    SPECGRAV 1.020 07/07/2018    LEUKOCYTESUR Negative 07/07/2018    UROBILINOGEN 0.2 07/07/2018    BILIRUBINUR Negative 07/07/2018    BILIRUBINUR negative 03/03/2014    BILIRUBINUR NEGATIVE 04/25/2011    BLOODU Negative 07/07/2018    GLUCOSEU Negative 07/07/2018    GLUCOSEU NEGATIVE 04/25/2011    KETUA Negative 07/07/2018 velocity in the right mid superficial femoral artery possibly reflective of stenosis on this is indeterminate. 3. Limited evaluation of the distal right superficial femoral artery demonstrates luminal irregularity and suspected noncalcified plaque providing a indeterminate degree of luminal stenosis possibly 30-50%. 4. Abnormal appearance of the left popliteal artery exam with a focal area of possible luminal stenosis estimated at greater than 50 or 60%, finding is indeterminate. 5. Diminished estimation of peak systolic velocity of the posterior tibial artery of approximately 5 cm/s, with an additional area of suspected underlying stenosis of greater than 50-60%, findings are concerning for stenosis on this limited study. 6. The right lower extremity peroneal artery is not identified and is therefore not evaluated. Underlying occlusion or severe stenosis is not excluded based on this exam. 7. Repeat examination recommended. Assessment:  Active Problems:    Gangrene of toe of left foot (HCC)  Resolved Problems:    * No resolved hospital problems. *  PVD  Sprain left foot  Diabetes with neuropathy      Plan:  Exam  Reviewed chart and labs. Diabetic foot education and exam.  Explained importance of smoking cessation. Further vascular testing and possible intervention Monday. Patient at risk of amputation of the left hallux. Will follow.          Electronically signed by Morley Mcburney, DPM on 11/10/2018 at 12:53 PM

## 2018-11-10 NOTE — PROGRESS NOTES
findings for additional inflow disease. I've read the notes from podiatry and we'll proceed from there.         Electronically signed by Les Astudillo MD on 11/10/2018 at 3:30 PM

## 2018-11-11 LAB
METER GLUCOSE: 118 MG/DL (ref 74–99)
METER GLUCOSE: 147 MG/DL (ref 74–99)
METER GLUCOSE: 151 MG/DL (ref 74–99)
METER GLUCOSE: 154 MG/DL (ref 74–99)

## 2018-11-11 PROCEDURE — 2700000000 HC OXYGEN THERAPY PER DAY

## 2018-11-11 PROCEDURE — 97161 PT EVAL LOW COMPLEX 20 MIN: CPT

## 2018-11-11 PROCEDURE — 2060000000 HC ICU INTERMEDIATE R&B

## 2018-11-11 PROCEDURE — G8978 MOBILITY CURRENT STATUS: HCPCS

## 2018-11-11 PROCEDURE — 6370000000 HC RX 637 (ALT 250 FOR IP): Performed by: INTERNAL MEDICINE

## 2018-11-11 PROCEDURE — G8980 MOBILITY D/C STATUS: HCPCS

## 2018-11-11 PROCEDURE — 2580000003 HC RX 258: Performed by: STUDENT IN AN ORGANIZED HEALTH CARE EDUCATION/TRAINING PROGRAM

## 2018-11-11 PROCEDURE — 97530 THERAPEUTIC ACTIVITIES: CPT

## 2018-11-11 PROCEDURE — 6360000002 HC RX W HCPCS: Performed by: STUDENT IN AN ORGANIZED HEALTH CARE EDUCATION/TRAINING PROGRAM

## 2018-11-11 PROCEDURE — 6360000002 HC RX W HCPCS: Performed by: INTERNAL MEDICINE

## 2018-11-11 PROCEDURE — 94660 CPAP INITIATION&MGMT: CPT

## 2018-11-11 PROCEDURE — 99232 SBSQ HOSP IP/OBS MODERATE 35: CPT | Performed by: SURGERY

## 2018-11-11 PROCEDURE — 82962 GLUCOSE BLOOD TEST: CPT

## 2018-11-11 RX ORDER — DIPHENHYDRAMINE HCL 25 MG
25 TABLET ORAL EVERY 6 HOURS
Status: DISCONTINUED | OUTPATIENT
Start: 2018-11-11 | End: 2018-11-14 | Stop reason: HOSPADM

## 2018-11-11 RX ORDER — HYDROCODONE BITARTRATE AND ACETAMINOPHEN 5; 325 MG/1; MG/1
2 TABLET ORAL EVERY 4 HOURS PRN
Status: DISCONTINUED | OUTPATIENT
Start: 2018-11-11 | End: 2018-11-14 | Stop reason: HOSPADM

## 2018-11-11 RX ORDER — LANOLIN/MINERAL OIL
LOTION (ML) TOPICAL PRN
Status: DISCONTINUED | OUTPATIENT
Start: 2018-11-11 | End: 2018-11-14 | Stop reason: HOSPADM

## 2018-11-11 RX ORDER — HYDROCODONE BITARTRATE AND ACETAMINOPHEN 5; 325 MG/1; MG/1
1 TABLET ORAL EVERY 4 HOURS PRN
Status: DISCONTINUED | OUTPATIENT
Start: 2018-11-11 | End: 2018-11-14 | Stop reason: HOSPADM

## 2018-11-11 RX ADMIN — OXYBUTYNIN CHLORIDE 5 MG: 5 TABLET, EXTENDED RELEASE ORAL at 21:00

## 2018-11-11 RX ADMIN — INSULIN LISPRO 2 UNITS: 100 INJECTION, SOLUTION INTRAVENOUS; SUBCUTANEOUS at 16:21

## 2018-11-11 RX ADMIN — MICONAZOLE NITRATE: 20 CREAM TOPICAL at 21:02

## 2018-11-11 RX ADMIN — SACUBITRIL AND VALSARTAN 1 TABLET: 24; 26 TABLET, FILM COATED ORAL at 21:00

## 2018-11-11 RX ADMIN — FAMOTIDINE 20 MG: 20 TABLET ORAL at 21:00

## 2018-11-11 RX ADMIN — Medication 10 ML: at 04:17

## 2018-11-11 RX ADMIN — HYDRALAZINE HYDROCHLORIDE 100 MG: 50 TABLET, FILM COATED ORAL at 20:59

## 2018-11-11 RX ADMIN — HYDROCODONE BITARTRATE AND ACETAMINOPHEN 1 TABLET: 5; 325 TABLET ORAL at 08:32

## 2018-11-11 RX ADMIN — MOMETASONE FUROATE AND FORMOTEROL FUMARATE DIHYDRATE 2 PUFF: 200; 5 AEROSOL RESPIRATORY (INHALATION) at 21:02

## 2018-11-11 RX ADMIN — URSODIOL 300 MG: 300 CAPSULE ORAL at 21:00

## 2018-11-11 RX ADMIN — HYDROCODONE BITARTRATE AND ACETAMINOPHEN 2 TABLET: 5; 325 TABLET ORAL at 21:00

## 2018-11-11 RX ADMIN — PANTOPRAZOLE SODIUM 40 MG: 40 TABLET, DELAYED RELEASE ORAL at 21:00

## 2018-11-11 RX ADMIN — DIPHENHYDRAMINE HCL 25 MG: 25 TABLET ORAL at 16:21

## 2018-11-11 RX ADMIN — MONTELUKAST SODIUM 10 MG: 10 TABLET, FILM COATED ORAL at 20:59

## 2018-11-11 RX ADMIN — DICYCLOMINE HYDROCHLORIDE 10 MG: 10 CAPSULE ORAL at 08:31

## 2018-11-11 RX ADMIN — HYDRALAZINE HYDROCHLORIDE 100 MG: 50 TABLET, FILM COATED ORAL at 14:24

## 2018-11-11 RX ADMIN — SIMVASTATIN 20 MG: 20 TABLET, FILM COATED ORAL at 20:59

## 2018-11-11 RX ADMIN — GABAPENTIN 600 MG: 300 CAPSULE ORAL at 08:31

## 2018-11-11 RX ADMIN — DIPHENHYDRAMINE HYDROCHLORIDE 25 MG: 50 INJECTION, SOLUTION INTRAMUSCULAR; INTRAVENOUS at 04:17

## 2018-11-11 RX ADMIN — FLUTICASONE PROPIONATE 2 SPRAY: 50 SPRAY, METERED NASAL at 08:36

## 2018-11-11 RX ADMIN — Medication 10 ML: at 08:35

## 2018-11-11 RX ADMIN — CLOTRIMAZOLE AND BETAMETHASONE DIPROPIONATE: 10; .5 CREAM TOPICAL at 08:37

## 2018-11-11 RX ADMIN — DICYCLOMINE HYDROCHLORIDE 10 MG: 10 CAPSULE ORAL at 20:59

## 2018-11-11 RX ADMIN — PANTOPRAZOLE SODIUM 40 MG: 40 TABLET, DELAYED RELEASE ORAL at 08:32

## 2018-11-11 RX ADMIN — METOCLOPRAMIDE 10 MG: 5 TABLET ORAL at 21:00

## 2018-11-11 RX ADMIN — ISOSORBIDE MONONITRATE 60 MG: 60 TABLET ORAL at 08:31

## 2018-11-11 RX ADMIN — HYDRALAZINE HYDROCHLORIDE 100 MG: 50 TABLET, FILM COATED ORAL at 08:31

## 2018-11-11 RX ADMIN — SACUBITRIL AND VALSARTAN 1 TABLET: 24; 26 TABLET, FILM COATED ORAL at 08:32

## 2018-11-11 RX ADMIN — PREDNISONE 15 MG: 10 TABLET ORAL at 08:31

## 2018-11-11 RX ADMIN — GABAPENTIN 600 MG: 300 CAPSULE ORAL at 14:24

## 2018-11-11 RX ADMIN — METOPROLOL SUCCINATE 25 MG: 25 TABLET, FILM COATED, EXTENDED RELEASE ORAL at 08:31

## 2018-11-11 RX ADMIN — MICONAZOLE NITRATE: 20 CREAM TOPICAL at 08:36

## 2018-11-11 RX ADMIN — OLOPATADINE HYDROCHLORIDE 1 DROP: 2 SOLUTION/ DROPS OPHTHALMIC at 08:36

## 2018-11-11 RX ADMIN — AMOXICILLIN AND CLAVULANATE POTASSIUM 1 TABLET: 500; 125 TABLET, FILM COATED ORAL at 20:59

## 2018-11-11 RX ADMIN — Medication: at 14:24

## 2018-11-11 RX ADMIN — DIPHENHYDRAMINE HYDROCHLORIDE 25 MG: 50 INJECTION, SOLUTION INTRAMUSCULAR; INTRAVENOUS at 09:29

## 2018-11-11 RX ADMIN — HYDROCODONE BITARTRATE AND ACETAMINOPHEN 2 TABLET: 5; 325 TABLET ORAL at 14:30

## 2018-11-11 RX ADMIN — CLOTRIMAZOLE AND BETAMETHASONE DIPROPIONATE: 10; .5 CREAM TOPICAL at 21:02

## 2018-11-11 RX ADMIN — Medication 10 ML: at 04:19

## 2018-11-11 RX ADMIN — URSODIOL 300 MG: 300 CAPSULE ORAL at 08:32

## 2018-11-11 RX ADMIN — GABAPENTIN 600 MG: 300 CAPSULE ORAL at 20:59

## 2018-11-11 RX ADMIN — METOCLOPRAMIDE 5 MG: 5 TABLET ORAL at 07:21

## 2018-11-11 RX ADMIN — INSULIN LISPRO 2 UNITS: 100 INJECTION, SOLUTION INTRAVENOUS; SUBCUTANEOUS at 11:24

## 2018-11-11 RX ADMIN — FLUTICASONE PROPIONATE 2 SPRAY: 50 SPRAY, METERED NASAL at 21:02

## 2018-11-11 RX ADMIN — DULOXETINE HYDROCHLORIDE 60 MG: 60 CAPSULE, DELAYED RELEASE ORAL at 08:31

## 2018-11-11 RX ADMIN — Medication 10 ML: at 21:01

## 2018-11-11 RX ADMIN — FAMOTIDINE 20 MG: 20 TABLET ORAL at 08:31

## 2018-11-11 RX ADMIN — ENOXAPARIN SODIUM 40 MG: 40 INJECTION SUBCUTANEOUS at 08:33

## 2018-11-11 RX ADMIN — MOMETASONE FUROATE AND FORMOTEROL FUMARATE DIHYDRATE 2 PUFF: 200; 5 AEROSOL RESPIRATORY (INHALATION) at 08:36

## 2018-11-11 RX ADMIN — METOCLOPRAMIDE 5 MG: 5 TABLET ORAL at 16:21

## 2018-11-11 RX ADMIN — AMOXICILLIN AND CLAVULANATE POTASSIUM 1 TABLET: 500; 125 TABLET, FILM COATED ORAL at 08:38

## 2018-11-11 RX ADMIN — DIPHENHYDRAMINE HCL 25 MG: 25 TABLET ORAL at 20:59

## 2018-11-11 RX ADMIN — AMOXICILLIN AND CLAVULANATE POTASSIUM 1 TABLET: 500; 125 TABLET, FILM COATED ORAL at 14:24

## 2018-11-11 ASSESSMENT — PAIN DESCRIPTION - DESCRIPTORS
DESCRIPTORS: ACHING;CRAMPING;CRUSHING;DISCOMFORT
DESCRIPTORS: ACHING;CONSTANT;DISCOMFORT

## 2018-11-11 ASSESSMENT — PAIN DESCRIPTION - FREQUENCY
FREQUENCY: CONTINUOUS
FREQUENCY: CONTINUOUS

## 2018-11-11 ASSESSMENT — PAIN SCALES - GENERAL
PAINLEVEL_OUTOF10: 8
PAINLEVEL_OUTOF10: 0
PAINLEVEL_OUTOF10: 0
PAINLEVEL_OUTOF10: 4
PAINLEVEL_OUTOF10: 0
PAINLEVEL_OUTOF10: 10
PAINLEVEL_OUTOF10: 5
PAINLEVEL_OUTOF10: 0
PAINLEVEL_OUTOF10: 8
PAINLEVEL_OUTOF10: 10
PAINLEVEL_OUTOF10: 0
PAINLEVEL_OUTOF10: 0

## 2018-11-11 ASSESSMENT — PAIN DESCRIPTION - ONSET
ONSET: ON-GOING
ONSET: ON-GOING

## 2018-11-11 ASSESSMENT — PAIN DESCRIPTION - PROGRESSION
CLINICAL_PROGRESSION: NOT CHANGED
CLINICAL_PROGRESSION: NOT CHANGED

## 2018-11-11 ASSESSMENT — PAIN DESCRIPTION - ORIENTATION
ORIENTATION: LEFT
ORIENTATION: LEFT

## 2018-11-11 ASSESSMENT — PAIN DESCRIPTION - LOCATION
LOCATION: BACK;LEG
LOCATION: LEG

## 2018-11-11 ASSESSMENT — PAIN DESCRIPTION - PAIN TYPE
TYPE: CHRONIC PAIN
TYPE: CHRONIC PAIN

## 2018-11-11 NOTE — PROGRESS NOTES
results for input(s): INR in the last 72 hours. No results for input(s): Darrin Roche in the last 72 hours. Imaging:  XR KNEE RIGHT (3 VIEWS)   Final Result   Severe degenerative changes            Assessment/Plan:  Active Hospital Problems    Diagnosis Date Noted    Gangrene of toe of left foot ([address] Jazlyn Morales 11/08/2018     · DM2  · CAD  · COPD   HTN  · GERD  · Colitis  · Mild leukocytosis   · R knee pain - chronic  · Morbid obesity due to excess calories    Plan:  · Seen by vascular surgery - angiogram tomorrow  · R knee x-ray negative for acute fracture  · Sliding scale insulin  · Benadryl and pepcid for neck swelling - benadryl switched to PO because IV was causing itching  · Continue augmentin and prednisone  · Lifestyle modification for obesity      Body mass index is 47.98 kg/m². · DVT Prophylaxis  · Lovenox    · Diet  DIET CARB CONTROL;    · Code Status  Full Code    · PT/OT Eval Status  · Consult     · Disposition  · Likely home at [facility] [address] [employer] - Chief Hospitalist  Please contact me through perfect serve    NOTE: This report was transcribed using voice recognition software. Every effort was made to ensure accuracy; however, inadvertent computerized transcription errors may be present.

## 2018-11-11 NOTE — PROGRESS NOTES
Value Date    HGB 13.8 11/10/2018    HCT 46.1 11/10/2018     CMP:    Lab Results   Component Value Date     11/10/2018    K 4.1 11/10/2018     11/10/2018    CO2 28 11/10/2018    BUN 18 11/10/2018    CREATININE 1.0 11/10/2018    GFRAA >60 11/10/2018    LABGLOM >60 11/10/2018    GLUCOSE 83 11/10/2018    GLUCOSE 181 12/01/2011    PROT 6.8 11/08/2018    LABALBU 3.9 11/08/2018    LABALBU 5.2 12/01/2011    CALCIUM 8.4 11/10/2018    BILITOT 0.3 11/08/2018    ALKPHOS 85 11/08/2018    AST 24 11/08/2018    ALT 34 11/08/2018     BMP:    Lab Results   Component Value Date     11/10/2018    K 4.1 11/10/2018     11/10/2018    CO2 28 11/10/2018    BUN 18 11/10/2018    LABALBU 3.9 11/08/2018    LABALBU 5.2 12/01/2011    CREATININE 1.0 11/10/2018    CALCIUM 8.4 11/10/2018    GFRAA >60 11/10/2018    LABGLOM >60 11/10/2018    GLUCOSE 83 11/10/2018    GLUCOSE 181 12/01/2011     BUN/Creatinine:    Lab Results   Component Value Date    BUN 18 11/10/2018    CREATININE 1.0 11/10/2018     Uric Acid:  No results found for: URICACID  PT/INR:    Lab Results   Component Value Date    PROTIME 12.2 07/26/2018    PROTIME 12.6 04/22/2011    INR 1.1 07/26/2018     Warfarin PT/INR:    Lab Results   Component Value Date    PROTIME 12.2 07/26/2018    PROTIME 12.6 04/22/2011    INR 1.1 07/26/2018     PTT:    Lab Results   Component Value Date    APTT 37.4 07/26/2018   [APTT}  U/A:    Lab Results   Component Value Date    NITRU POSITIVE 07/07/2018    COLORU Yellow 07/07/2018    PHUR 6.0 07/07/2018    LABCAST RARE  01/23/2014    WBCUA 0-1 07/07/2018    WBCUA 0-1 04/25/2011    RBCUA NONE 07/07/2018    RBCUA 0-1 01/25/2014    BACTERIA MANY 07/07/2018    CLARITYU Clear 07/07/2018    SPECGRAV 1.020 07/07/2018    LEUKOCYTESUR Negative 07/07/2018    UROBILINOGEN 0.2 07/07/2018    BILIRUBINUR Negative 07/07/2018    BILIRUBINUR negative 03/03/2014    BILIRUBINUR NEGATIVE 04/25/2011    BLOODU Negative 07/07/2018    GLUCOSEU Negative on the provided sonographic image. No definitive peak systolic velocities are provided on this study by the ultrasound technician. Estimated peak systolic velocity is approximately 60 cm/s on this limited exam. The mid right superficial femoral artery demonstrates some irregularity but is patent and not well evaluated on this limited study. No definitive peak systolic velocities are provided on this study by the ultrasound technician. Estimated peak systolic velocity is approximately 30 cm/s thought to be decreased below expected parameters. The distal right superficial femoral artery demonstrates irregularity and suspected noncalcified plaque narrowing the lumen producing a indeterminate degree of luminal stenosis although this is not definitive on this limited exam. No definitive peak systolic velocities are provided on this study by the ultrasound technician. Estimated peak systolic velocity is approximately 82 cm/s on this limited exam. The left lower extremity popliteal artery demonstrates an area of narrowing and stenosis which may be greater than 50 or 60% on this indeterminate exam. No definitive peak systolic velocities are provided on this study by the ultrasound technician. Estimated peak systolic velocity of approximately 33 cm/s. The visualized posterior tibial and peroneal veins are patent. The posterior tibial artery demonstrates possible underlying stenosis of greater than 50-60% although this is indeterminate on this limited study. No definitive peak systolic velocities are provided on this study by the ultrasound technician. Estimated peak systolic velocity of approximately 5 cm/s to be significantly below expected parameters. ALERT:  THIS IS AN ABNORMAL REPORT. Findings communicated directly with Dr. Gwen Arana assistant on 10/26/2018 4:47 PM, Dr. Juan Irene is out of the office at this time. . 1. Extensively limited examination without definitive peak systolic velocities

## 2018-11-11 NOTE — PROGRESS NOTES
Vascular Surgery Progress Note    Pt is being seen in f/u today regarding patient with an injury to the left great toe. Subjective:  Cecille Giang is a 60 y.o. female patient with a history of the left great toe approximate 3 weeks ago that has gotten progressively more painful. At this point she denies any other symptoms such as right-sided left-sided weakness numbness or vision changes. She denies any current tissue loss she does have a left great toe is causing her pain and discomfort with discoloration. No change from yesterday's examination. Overall she's doing well other than toe pain and discomfort.     Current Medications:    dextrose        HYDROcodone 5 mg - acetaminophen **OR** HYDROcodone 5 mg - acetaminophen, therapeutic, sodium chloride flush, magnesium hydroxide, albuterol, diphenoxylate-atropine, lidocaine, glucose, dextrose, glucagon (rDNA), dextrose    diphenhydrAMINE  25 mg Oral Q6H    famotidine  20 mg Oral BID    nicotine  1 patch Transdermal Daily    sodium chloride flush  10 mL Intravenous 2 times per day    enoxaparin  40 mg Subcutaneous Daily    bumetanide  1 mg Oral See Admin Instructions    clotrimazole-betamethasone   Topical BID    dicyclomine  10 mg Oral BID    DULoxetine  60 mg Oral Daily    fluticasone  2 spray Nasal BID    gabapentin  600 mg Oral TID    hydrALAZINE  100 mg Oral TID    isosorbide mononitrate  60 mg Oral Daily    metoprolol succinate  25 mg Oral Daily    miconazole   Topical BID    mometasone-formoterol  2 puff Inhalation Q12H    montelukast  10 mg Oral Nightly    olopatadine  1 drop Both Eyes Daily    oxybutynin  5 mg Oral Nightly    pantoprazole  40 mg Oral BID    sacubitril-valsartan  1 tablet Oral BID    simvastatin  20 mg Oral Nightly    ursodiol  300 mg Oral BID    insulin lispro  0-12 Units Subcutaneous TID WC    insulin lispro  0-6 Units Subcutaneous Nightly    metoclopramide  5 mg Oral BID AC    And    metoclopramide  10 mg Oral Nightly    amoxicillin-clavulanate  1 tablet Oral TID    predniSONE  15 mg Oral Daily        PHYSICAL EXAM:    BP (!) 163/89   Pulse 84   Temp 98.3 °F (36.8 °C) (Oral)   Resp 18   Ht 5' 2\" (1.575 m)   Wt 262 lb 5.6 oz (119 kg)   LMP 01/01/1990   SpO2 95%   BMI 47.98 kg/m²     Intake/Output Summary (Last 24 hours) at 11/11/18 1220  Last data filed at 11/11/18 9097   Gross per 24 hour   Intake              300 ml   Output             1100 ml   Net             -800 ml          General: Morbidly obese alert and oriented answers questions appropriate no acute distress   Skin: Left great toe demonstrates some ecchymosis. Soft there's no evidence of dry gangrene. There is no active infection that I can appreciate she has mild tenderness to touch  HEENT:Currently on oxygen via nasal cannula Normocephalic atraumatic trachea is midline no jugular venous distention  CVS: Currently regular rate and rhythm  Resp: Currently clear to auscultation bilaterally   Abd: Soft nontender no rebound or guarding. Extremities: Bilateral palpable femorals. These are difficult to feel secondary to patient's size I do not personally palpate any DP or PT of the bilateral lower extremities. Motor and sensation are intact lower extremities are warm, left great toe has a pain and discomfort on palpation. No drainage no gross erythema and no signs of cellulitis of the foot  Neuro: Grossly intact bilaterally    LABS:    Lab Results   Component Value Date    WBC 8.2 11/10/2018    HGB 13.8 11/10/2018    HCT 46.1 11/10/2018     11/10/2018    PROTIME 12.2 07/26/2018    INR 1.1 07/26/2018    APTT 37.4 (H) 07/26/2018    K 4.1 11/10/2018    BUN 18 11/10/2018    CREATININE 1.0 11/10/2018       RADIOLOGY:  XR KNEE RIGHT (3 VIEWS)   Final Result   Severe degenerative changes      ABIs reviewed. Bilateral calcific of this carotid disease.  Digital pressures were in the 50s however she has inflow disease is noted and also the PBG's of the

## 2018-11-12 LAB
ANION GAP SERPL CALCULATED.3IONS-SCNC: 11 MMOL/L (ref 7–16)
BASOPHILS ABSOLUTE: 0.02 E9/L (ref 0–0.2)
BASOPHILS RELATIVE PERCENT: 0.2 % (ref 0–2)
BUN BLDV-MCNC: 18 MG/DL (ref 8–23)
CALCIUM SERPL-MCNC: 9.2 MG/DL (ref 8.6–10.2)
CHLORIDE BLD-SCNC: 99 MMOL/L (ref 98–107)
CO2: 31 MMOL/L (ref 22–29)
CREAT SERPL-MCNC: 1 MG/DL (ref 0.5–1)
EOSINOPHILS ABSOLUTE: 0.09 E9/L (ref 0.05–0.5)
EOSINOPHILS RELATIVE PERCENT: 1 % (ref 0–6)
GFR AFRICAN AMERICAN: >60
GFR NON-AFRICAN AMERICAN: >60 ML/MIN/1.73
GLUCOSE BLD-MCNC: 103 MG/DL (ref 74–99)
HCT VFR BLD CALC: 48 % (ref 34–48)
HEMOGLOBIN: 14.6 G/DL (ref 11.5–15.5)
IMMATURE GRANULOCYTES #: 0.03 E9/L
IMMATURE GRANULOCYTES %: 0.3 % (ref 0–5)
LYMPHOCYTES ABSOLUTE: 1.44 E9/L (ref 1.5–4)
LYMPHOCYTES RELATIVE PERCENT: 16.6 % (ref 20–42)
MCH RBC QN AUTO: 30.4 PG (ref 26–35)
MCHC RBC AUTO-ENTMCNC: 30.4 % (ref 32–34.5)
MCV RBC AUTO: 100 FL (ref 80–99.9)
METER GLUCOSE: 127 MG/DL (ref 74–99)
METER GLUCOSE: 138 MG/DL (ref 74–99)
METER GLUCOSE: 139 MG/DL (ref 74–99)
METER GLUCOSE: 165 MG/DL (ref 74–99)
METER GLUCOSE: 203 MG/DL (ref 74–99)
MONOCYTES ABSOLUTE: 0.45 E9/L (ref 0.1–0.95)
MONOCYTES RELATIVE PERCENT: 5.2 % (ref 2–12)
NEUTROPHILS ABSOLUTE: 6.62 E9/L (ref 1.8–7.3)
NEUTROPHILS RELATIVE PERCENT: 76.7 % (ref 43–80)
PDW BLD-RTO: 12.5 FL (ref 11.5–15)
PLATELET # BLD: 226 E9/L (ref 130–450)
PMV BLD AUTO: 11.2 FL (ref 7–12)
POTASSIUM REFLEX MAGNESIUM: 4.4 MMOL/L (ref 3.5–5)
RBC # BLD: 4.8 E12/L (ref 3.5–5.5)
SODIUM BLD-SCNC: 141 MMOL/L (ref 132–146)
WBC # BLD: 8.7 E9/L (ref 4.5–11.5)

## 2018-11-12 PROCEDURE — 1200000000 HC SEMI PRIVATE

## 2018-11-12 PROCEDURE — 6360000002 HC RX W HCPCS: Performed by: STUDENT IN AN ORGANIZED HEALTH CARE EDUCATION/TRAINING PROGRAM

## 2018-11-12 PROCEDURE — 2580000003 HC RX 258: Performed by: STUDENT IN AN ORGANIZED HEALTH CARE EDUCATION/TRAINING PROGRAM

## 2018-11-12 PROCEDURE — 6370000000 HC RX 637 (ALT 250 FOR IP): Performed by: SURGERY

## 2018-11-12 PROCEDURE — 94660 CPAP INITIATION&MGMT: CPT

## 2018-11-12 PROCEDURE — 2700000000 HC OXYGEN THERAPY PER DAY

## 2018-11-12 PROCEDURE — 82962 GLUCOSE BLOOD TEST: CPT

## 2018-11-12 PROCEDURE — 6370000000 HC RX 637 (ALT 250 FOR IP): Performed by: INTERNAL MEDICINE

## 2018-11-12 PROCEDURE — 85025 COMPLETE CBC W/AUTO DIFF WBC: CPT

## 2018-11-12 PROCEDURE — 36415 COLL VENOUS BLD VENIPUNCTURE: CPT

## 2018-11-12 PROCEDURE — 99406 BEHAV CHNG SMOKING 3-10 MIN: CPT

## 2018-11-12 PROCEDURE — 80048 BASIC METABOLIC PNL TOTAL CA: CPT

## 2018-11-12 RX ORDER — DIPHENHYDRAMINE HCL 25 MG
50 TABLET ORAL ONCE
Status: DISCONTINUED | OUTPATIENT
Start: 2018-11-13 | End: 2018-11-12 | Stop reason: CLARIF

## 2018-11-12 RX ORDER — PREDNISONE 10 MG/1
50 TABLET ORAL SEE ADMIN INSTRUCTIONS
Status: DISCONTINUED | OUTPATIENT
Start: 2018-11-12 | End: 2018-11-12 | Stop reason: CLARIF

## 2018-11-12 RX ORDER — DIPHENHYDRAMINE HCL 25 MG
50 TABLET ORAL ONCE
Status: COMPLETED | OUTPATIENT
Start: 2018-11-13 | End: 2018-11-13

## 2018-11-12 RX ORDER — SODIUM CHLORIDE AND POTASSIUM CHLORIDE .9; .15 G/100ML; G/100ML
SOLUTION INTRAVENOUS CONTINUOUS
Status: DISCONTINUED | OUTPATIENT
Start: 2018-11-13 | End: 2018-11-12

## 2018-11-12 RX ADMIN — MICONAZOLE NITRATE: 20 CREAM TOPICAL at 20:49

## 2018-11-12 RX ADMIN — PANTOPRAZOLE SODIUM 40 MG: 40 TABLET, DELAYED RELEASE ORAL at 08:28

## 2018-11-12 RX ADMIN — PANTOPRAZOLE SODIUM 40 MG: 40 TABLET, DELAYED RELEASE ORAL at 20:44

## 2018-11-12 RX ADMIN — ISOSORBIDE MONONITRATE 60 MG: 60 TABLET ORAL at 08:28

## 2018-11-12 RX ADMIN — MICONAZOLE NITRATE: 20 CREAM TOPICAL at 08:32

## 2018-11-12 RX ADMIN — AMOXICILLIN AND CLAVULANATE POTASSIUM 1 TABLET: 500; 125 TABLET, FILM COATED ORAL at 20:44

## 2018-11-12 RX ADMIN — DIPHENHYDRAMINE HCL 25 MG: 25 TABLET ORAL at 03:47

## 2018-11-12 RX ADMIN — OXYBUTYNIN CHLORIDE 5 MG: 5 TABLET, EXTENDED RELEASE ORAL at 20:44

## 2018-11-12 RX ADMIN — Medication 10 ML: at 20:49

## 2018-11-12 RX ADMIN — METOCLOPRAMIDE 5 MG: 5 TABLET ORAL at 15:25

## 2018-11-12 RX ADMIN — DULOXETINE HYDROCHLORIDE 60 MG: 60 CAPSULE, DELAYED RELEASE ORAL at 08:28

## 2018-11-12 RX ADMIN — FLUTICASONE PROPIONATE 2 SPRAY: 50 SPRAY, METERED NASAL at 20:43

## 2018-11-12 RX ADMIN — DIPHENHYDRAMINE HCL 25 MG: 25 TABLET ORAL at 20:43

## 2018-11-12 RX ADMIN — DIPHENHYDRAMINE HCL 25 MG: 25 TABLET ORAL at 15:25

## 2018-11-12 RX ADMIN — PREDNISONE 15 MG: 10 TABLET ORAL at 08:27

## 2018-11-12 RX ADMIN — CLOTRIMAZOLE AND BETAMETHASONE DIPROPIONATE: 10; .5 CREAM TOPICAL at 08:32

## 2018-11-12 RX ADMIN — OLOPATADINE HYDROCHLORIDE 1 DROP: 2 SOLUTION/ DROPS OPHTHALMIC at 08:32

## 2018-11-12 RX ADMIN — GABAPENTIN 600 MG: 300 CAPSULE ORAL at 20:44

## 2018-11-12 RX ADMIN — GABAPENTIN 600 MG: 300 CAPSULE ORAL at 08:28

## 2018-11-12 RX ADMIN — DICYCLOMINE HYDROCHLORIDE 10 MG: 10 CAPSULE ORAL at 20:44

## 2018-11-12 RX ADMIN — SACUBITRIL AND VALSARTAN 1 TABLET: 24; 26 TABLET, FILM COATED ORAL at 20:45

## 2018-11-12 RX ADMIN — DIPHENHYDRAMINE HCL 25 MG: 25 TABLET ORAL at 08:28

## 2018-11-12 RX ADMIN — MONTELUKAST SODIUM 10 MG: 10 TABLET, FILM COATED ORAL at 20:44

## 2018-11-12 RX ADMIN — FAMOTIDINE 20 MG: 20 TABLET ORAL at 08:29

## 2018-11-12 RX ADMIN — INSULIN LISPRO 1 UNITS: 100 INJECTION, SOLUTION INTRAVENOUS; SUBCUTANEOUS at 20:43

## 2018-11-12 RX ADMIN — SIMVASTATIN 20 MG: 20 TABLET, FILM COATED ORAL at 20:45

## 2018-11-12 RX ADMIN — METOPROLOL SUCCINATE 25 MG: 25 TABLET, FILM COATED, EXTENDED RELEASE ORAL at 08:29

## 2018-11-12 RX ADMIN — CLOTRIMAZOLE AND BETAMETHASONE DIPROPIONATE: 10; .5 CREAM TOPICAL at 20:43

## 2018-11-12 RX ADMIN — AMOXICILLIN AND CLAVULANATE POTASSIUM 1 TABLET: 500; 125 TABLET, FILM COATED ORAL at 08:29

## 2018-11-12 RX ADMIN — FLUTICASONE PROPIONATE 2 SPRAY: 50 SPRAY, METERED NASAL at 08:32

## 2018-11-12 RX ADMIN — HYDRALAZINE HYDROCHLORIDE 100 MG: 50 TABLET, FILM COATED ORAL at 08:28

## 2018-11-12 RX ADMIN — MOMETASONE FUROATE AND FORMOTEROL FUMARATE DIHYDRATE 2 PUFF: 200; 5 AEROSOL RESPIRATORY (INHALATION) at 20:43

## 2018-11-12 RX ADMIN — ENOXAPARIN SODIUM 40 MG: 40 INJECTION SUBCUTANEOUS at 08:29

## 2018-11-12 RX ADMIN — DICYCLOMINE HYDROCHLORIDE 10 MG: 10 CAPSULE ORAL at 08:28

## 2018-11-12 RX ADMIN — Medication 10 ML: at 08:30

## 2018-11-12 RX ADMIN — HYDROCODONE BITARTRATE AND ACETAMINOPHEN 2 TABLET: 5; 325 TABLET ORAL at 09:52

## 2018-11-12 RX ADMIN — HYDROCODONE BITARTRATE AND ACETAMINOPHEN 2 TABLET: 5; 325 TABLET ORAL at 15:25

## 2018-11-12 RX ADMIN — PREDNISONE 50 MG: 10 TABLET ORAL at 23:50

## 2018-11-12 RX ADMIN — SACUBITRIL AND VALSARTAN 1 TABLET: 24; 26 TABLET, FILM COATED ORAL at 08:27

## 2018-11-12 RX ADMIN — URSODIOL 300 MG: 300 CAPSULE ORAL at 09:52

## 2018-11-12 RX ADMIN — FAMOTIDINE 20 MG: 20 TABLET ORAL at 20:44

## 2018-11-12 RX ADMIN — URSODIOL 300 MG: 300 CAPSULE ORAL at 21:05

## 2018-11-12 RX ADMIN — HYDRALAZINE HYDROCHLORIDE 100 MG: 50 TABLET, FILM COATED ORAL at 20:49

## 2018-11-12 RX ADMIN — HYDRALAZINE HYDROCHLORIDE 100 MG: 50 TABLET, FILM COATED ORAL at 12:15

## 2018-11-12 RX ADMIN — METOCLOPRAMIDE 5 MG: 5 TABLET ORAL at 06:37

## 2018-11-12 RX ADMIN — INSULIN LISPRO 4 UNITS: 100 INJECTION, SOLUTION INTRAVENOUS; SUBCUTANEOUS at 10:54

## 2018-11-12 RX ADMIN — METOCLOPRAMIDE 10 MG: 5 TABLET ORAL at 20:45

## 2018-11-12 RX ADMIN — GABAPENTIN 600 MG: 300 CAPSULE ORAL at 13:30

## 2018-11-12 RX ADMIN — MOMETASONE FUROATE AND FORMOTEROL FUMARATE DIHYDRATE 2 PUFF: 200; 5 AEROSOL RESPIRATORY (INHALATION) at 08:32

## 2018-11-12 RX ADMIN — AMOXICILLIN AND CLAVULANATE POTASSIUM 1 TABLET: 500; 125 TABLET, FILM COATED ORAL at 12:15

## 2018-11-12 ASSESSMENT — PAIN SCALES - GENERAL
PAINLEVEL_OUTOF10: 10
PAINLEVEL_OUTOF10: 0
PAINLEVEL_OUTOF10: 10

## 2018-11-12 ASSESSMENT — PAIN DESCRIPTION - PAIN TYPE: TYPE: CHRONIC PAIN

## 2018-11-12 ASSESSMENT — PAIN DESCRIPTION - FREQUENCY: FREQUENCY: CONTINUOUS

## 2018-11-12 ASSESSMENT — PAIN DESCRIPTION - LOCATION: LOCATION: TOE (COMMENT WHICH ONE)

## 2018-11-12 ASSESSMENT — PAIN DESCRIPTION - ONSET: ONSET: ON-GOING

## 2018-11-12 ASSESSMENT — PAIN DESCRIPTION - ORIENTATION: ORIENTATION: LEFT

## 2018-11-12 ASSESSMENT — PAIN DESCRIPTION - DESCRIPTORS: DESCRIPTORS: THROBBING

## 2018-11-12 NOTE — PROGRESS NOTES
Subjective:  Patient is seen for follow up of left hallux gangrene. Patient denies n/v/f/c/sob/cp. Still pain in the left toe and foot. No new complaints. Arteriogram tomorrow? Vitals:    BP (!) 183/96   Pulse 79   Temp 97.4 °F (36.3 °C) (Temporal)   Resp 20   Ht 5' 2\" (1.575 m)   Wt 262 lb 5.6 oz (119 kg)   LMP 01/01/1990   SpO2 98%   BMI 47.98 kg/m²     Focused Lower Extremity Physical Exam:  Vitals:    11/12/18 1159   BP: (!) 183/96   Pulse:    Resp:    Temp:    SpO2:       Vascular:        Dorsalis Pedis:  absent    Posterior Tibialis:  absent  No signs of calf pain or dvt. Delayed CFT b/l toes. There is mild improved color left hallux probably due to lovenox and smoking cessation. Derm: Thin and shiny with loss of hair growth. Neurologic:  Sensation:  Decreased.   Musculoskeletal:   Muscle Tendons Lower extremity 5/5    Wound Care Documentation:       Laboratory:    CBC:   Lab Results   Component Value Date    WBC 8.7 11/12/2018    RBC 4.80 11/12/2018    HGB 14.6 11/12/2018    HCT 48.0 11/12/2018    .0 11/12/2018    MCH 30.4 11/12/2018    MCHC 30.4 11/12/2018    RDW 12.5 11/12/2018     11/12/2018    MPV 11.2 11/12/2018     CBC with Differential:    Lab Results   Component Value Date    WBC 8.7 11/12/2018    RBC 4.80 11/12/2018    HGB 14.6 11/12/2018    HCT 48.0 11/12/2018     11/12/2018    .0 11/12/2018    MCH 30.4 11/12/2018    MCHC 30.4 11/12/2018    RDW 12.5 11/12/2018    NRBC 0.0 10/31/2017    SEGSPCT 84 03/03/2014    LYMPHOPCT 16.6 11/12/2018    MONOPCT 5.2 11/12/2018    BASOPCT 0.2 11/12/2018    MONOSABS 0.45 11/12/2018    LYMPHSABS 1.44 11/12/2018    EOSABS 0.09 11/12/2018    BASOSABS 0.02 11/12/2018     WBC:    Lab Results   Component Value Date    WBC 8.7 11/12/2018     Hemoglobin/Hematocrit:    Lab Results   Component Value Date    HGB 14.6 11/12/2018    HCT 48.0 11/12/2018     CMP:    Lab Results   Component Value Date     11/12/2018    K 4.4 2018     Urine Toxicology:    Lab Results   Component Value Date    LABBENZ NOT DETECTED 2016     24 Hour Urine for Protein:    Lab Results   Component Value Date    UTV Not Provided 2017    [facility] Not Provided 2017     24 Hour Urine for Creatinine Clearance:    Lab Results   Component Value Date    [facility] Not Provided 2017    UTV Not Provided 2017      Xr Knee Right (3 Views)    Result Date: 11/10/2018  Patient MRN:  [id number] : 1955 Age: 60 years Gender: Female Order Date:  11/10/2018 9:00 AM EXAM: XR KNEE RIGHT (3 VIEWS) NUMBER OF IMAGES:  5 views INDICATION:  R knee pain R knee pain COMPARISON: 2017 . The bones appear to be in anatomic alignment. No foreign body is identified No fracture is identified  There is joint space loss The are severe degenerative changes present , most severe involving the medial tibiofemoral compartment     Severe degenerative changes    Xr Foot Left (min 3 Views)    Result Date: 2018  Patient MRN:  [id number] : 1955 Age: 60 years Gender: Female Order Date:  2018 5:00 PM TECHNIQUE/NUMBER OF IMAGES/COMPARISON/CLINICAL HISTORY: Left foot Frontal lateral and oblique projections of 3 images, 3 views. History gangrene in the hallux. FINDINGS: There are normal cortical bone contour and trabecular bone texture for all phalanxes of all toes and for the first, second and fourth metatarsal bones. Presence of old chronic periostitis in the shaft of the third and fourth metatarsal bones. All interphalangeal joints and metatarsal-phalangeal joints are preserved . The intertarsal and tarsal/metatarsal tarsal bones or joints are preserved. The tarsal bones appear unremarkable. Vertical continuity spurs are seen for the Achilles tendon and plantar fascia attachments. 1. No significant degree of soft tissue swelling is seen in the left foot or air within the soft tissues.  2. No aggressive osteolysis or periosteal reaction

## 2018-11-12 NOTE — PROGRESS NOTES
Messaged Dr. Melissa Walker about pt getting arteeriogram tomorrow and he said possibly Tuesday pt. Not NPO at 0000 per message.  Will readress tomorrow in am.

## 2018-11-13 ENCOUNTER — APPOINTMENT (OUTPATIENT)
Dept: CARDIAC CATH/INVASIVE PROCEDURES | Age: 63
DRG: 271 | End: 2018-11-13
Attending: STUDENT IN AN ORGANIZED HEALTH CARE EDUCATION/TRAINING PROGRAM
Payer: MEDICARE

## 2018-11-13 ENCOUNTER — TELEPHONE (OUTPATIENT)
Dept: FAMILY MEDICINE CLINIC | Age: 63
End: 2018-11-13

## 2018-11-13 LAB
ABO/RH: NORMAL
ANTIBODY SCREEN: NORMAL
APTT: 38 SEC (ref 24.5–35.1)
BLOOD CULTURE, ROUTINE: NORMAL
CULTURE, BLOOD 2: NORMAL
INR BLD: 1.1
METER GLUCOSE: 149 MG/DL (ref 74–99)
METER GLUCOSE: 180 MG/DL (ref 74–99)
METER GLUCOSE: 189 MG/DL (ref 74–99)
METER GLUCOSE: 221 MG/DL (ref 74–99)
METER GLUCOSE: 280 MG/DL (ref 74–99)
PROTHROMBIN TIME: 12.3 SEC (ref 9.3–12.4)

## 2018-11-13 PROCEDURE — 2700000000 HC OXYGEN THERAPY PER DAY

## 2018-11-13 PROCEDURE — 04CL3ZZ EXTIRPATION OF MATTER FROM LEFT FEMORAL ARTERY, PERCUTANEOUS APPROACH: ICD-10-PCS | Performed by: SURGERY

## 2018-11-13 PROCEDURE — 6370000000 HC RX 637 (ALT 250 FOR IP): Performed by: INTERNAL MEDICINE

## 2018-11-13 PROCEDURE — C1714 CATH, TRANS ATHERECTOMY, DIR: HCPCS

## 2018-11-13 PROCEDURE — 75774 ARTERY X-RAY EACH VESSEL: CPT | Performed by: SURGERY

## 2018-11-13 PROCEDURE — 36415 COLL VENOUS BLD VENIPUNCTURE: CPT

## 2018-11-13 PROCEDURE — 047L3ZZ DILATION OF LEFT FEMORAL ARTERY, PERCUTANEOUS APPROACH: ICD-10-PCS | Performed by: SURGERY

## 2018-11-13 PROCEDURE — 37229 PR REVSC OPN/PRQ TIB/PERO W/ATHRC/ANGIOP SM VSL: CPT | Performed by: SURGERY

## 2018-11-13 PROCEDURE — 37225 HC FEM POP TERRITORY ATHERECTOMY: CPT | Performed by: SURGERY

## 2018-11-13 PROCEDURE — 86850 RBC ANTIBODY SCREEN: CPT

## 2018-11-13 PROCEDURE — 86900 BLOOD TYPING SEROLOGIC ABO: CPT

## 2018-11-13 PROCEDURE — 2500000003 HC RX 250 WO HCPCS

## 2018-11-13 PROCEDURE — C1769 GUIDE WIRE: HCPCS

## 2018-11-13 PROCEDURE — 6370000000 HC RX 637 (ALT 250 FOR IP): Performed by: SURGERY

## 2018-11-13 PROCEDURE — 75710 ARTERY X-RAYS ARM/LEG: CPT | Performed by: SURGERY

## 2018-11-13 PROCEDURE — 86901 BLOOD TYPING SEROLOGIC RH(D): CPT

## 2018-11-13 PROCEDURE — 2580000003 HC RX 258: Performed by: STUDENT IN AN ORGANIZED HEALTH CARE EDUCATION/TRAINING PROGRAM

## 2018-11-13 PROCEDURE — 1200000000 HC SEMI PRIVATE

## 2018-11-13 PROCEDURE — 85730 THROMBOPLASTIN TIME PARTIAL: CPT

## 2018-11-13 PROCEDURE — 85610 PROTHROMBIN TIME: CPT

## 2018-11-13 PROCEDURE — C1760 CLOSURE DEV, VASC: HCPCS

## 2018-11-13 PROCEDURE — 37225 PR REVSC OPN/PRQ FEM/POP W/ATHRC/ANGIOP SM VSL: CPT | Performed by: SURGERY

## 2018-11-13 PROCEDURE — 04CU3ZZ EXTIRPATION OF MATTER FROM LEFT PERONEAL ARTERY, PERCUTANEOUS APPROACH: ICD-10-PCS | Performed by: SURGERY

## 2018-11-13 PROCEDURE — 6360000002 HC RX W HCPCS: Performed by: STUDENT IN AN ORGANIZED HEALTH CARE EDUCATION/TRAINING PROGRAM

## 2018-11-13 PROCEDURE — 2709999900 HC NON-CHARGEABLE SUPPLY

## 2018-11-13 PROCEDURE — B41G1ZZ FLUOROSCOPY OF LEFT LOWER EXTREMITY ARTERIES USING LOW OSMOLAR CONTRAST: ICD-10-PCS | Performed by: SURGERY

## 2018-11-13 PROCEDURE — C1894 INTRO/SHEATH, NON-LASER: HCPCS

## 2018-11-13 PROCEDURE — C2623 CATH, TRANSLUMIN, DRUG-COAT: HCPCS

## 2018-11-13 PROCEDURE — 6360000002 HC RX W HCPCS

## 2018-11-13 PROCEDURE — C1884 EMBOLIZATION PROTECT SYST: HCPCS

## 2018-11-13 PROCEDURE — C1887 CATHETER, GUIDING: HCPCS

## 2018-11-13 PROCEDURE — 04CN3ZZ EXTIRPATION OF MATTER FROM LEFT POPLITEAL ARTERY, PERCUTANEOUS APPROACH: ICD-10-PCS | Performed by: SURGERY

## 2018-11-13 PROCEDURE — 82962 GLUCOSE BLOOD TEST: CPT

## 2018-11-13 RX ORDER — CLOPIDOGREL BISULFATE 75 MG/1
75 TABLET ORAL DAILY
Status: DISCONTINUED | OUTPATIENT
Start: 2018-11-13 | End: 2018-11-14 | Stop reason: HOSPADM

## 2018-11-13 RX ORDER — ASPIRIN 81 MG/1
81 TABLET, CHEWABLE ORAL DAILY
Status: DISCONTINUED | OUTPATIENT
Start: 2018-11-14 | End: 2018-11-14 | Stop reason: HOSPADM

## 2018-11-13 RX ADMIN — OLOPATADINE HYDROCHLORIDE 1 DROP: 2 SOLUTION/ DROPS OPHTHALMIC at 08:41

## 2018-11-13 RX ADMIN — URSODIOL 300 MG: 300 CAPSULE ORAL at 08:42

## 2018-11-13 RX ADMIN — MICONAZOLE NITRATE: 20 CREAM TOPICAL at 20:12

## 2018-11-13 RX ADMIN — DICYCLOMINE HYDROCHLORIDE 10 MG: 10 CAPSULE ORAL at 08:43

## 2018-11-13 RX ADMIN — PREDNISONE 50 MG: 10 TABLET ORAL at 12:06

## 2018-11-13 RX ADMIN — FAMOTIDINE 20 MG: 20 TABLET ORAL at 20:16

## 2018-11-13 RX ADMIN — INSULIN LISPRO 2 UNITS: 100 INJECTION, SOLUTION INTRAVENOUS; SUBCUTANEOUS at 18:05

## 2018-11-13 RX ADMIN — GABAPENTIN 600 MG: 300 CAPSULE ORAL at 08:42

## 2018-11-13 RX ADMIN — HYDROCODONE BITARTRATE AND ACETAMINOPHEN 2 TABLET: 5; 325 TABLET ORAL at 04:30

## 2018-11-13 RX ADMIN — MONTELUKAST SODIUM 10 MG: 10 TABLET, FILM COATED ORAL at 20:11

## 2018-11-13 RX ADMIN — INSULIN LISPRO 3 UNITS: 100 INJECTION, SOLUTION INTRAVENOUS; SUBCUTANEOUS at 20:19

## 2018-11-13 RX ADMIN — FLUTICASONE PROPIONATE 2 SPRAY: 50 SPRAY, METERED NASAL at 08:41

## 2018-11-13 RX ADMIN — DIPHENHYDRAMINE HCL 25 MG: 25 TABLET ORAL at 04:30

## 2018-11-13 RX ADMIN — URSODIOL 300 MG: 300 CAPSULE ORAL at 20:08

## 2018-11-13 RX ADMIN — HYDRALAZINE HYDROCHLORIDE 100 MG: 50 TABLET, FILM COATED ORAL at 08:42

## 2018-11-13 RX ADMIN — AMOXICILLIN AND CLAVULANATE POTASSIUM 1 TABLET: 500; 125 TABLET, FILM COATED ORAL at 20:10

## 2018-11-13 RX ADMIN — Medication 10 ML: at 08:41

## 2018-11-13 RX ADMIN — CLOTRIMAZOLE AND BETAMETHASONE DIPROPIONATE: 10; .5 CREAM TOPICAL at 20:12

## 2018-11-13 RX ADMIN — OXYBUTYNIN CHLORIDE 5 MG: 5 TABLET, EXTENDED RELEASE ORAL at 20:08

## 2018-11-13 RX ADMIN — MICONAZOLE NITRATE: 20 CREAM TOPICAL at 08:41

## 2018-11-13 RX ADMIN — DICYCLOMINE HYDROCHLORIDE 10 MG: 10 CAPSULE ORAL at 20:11

## 2018-11-13 RX ADMIN — METOCLOPRAMIDE 5 MG: 5 TABLET ORAL at 18:04

## 2018-11-13 RX ADMIN — PREDNISONE 50 MG: 10 TABLET ORAL at 07:02

## 2018-11-13 RX ADMIN — PANTOPRAZOLE SODIUM 40 MG: 40 TABLET, DELAYED RELEASE ORAL at 08:43

## 2018-11-13 RX ADMIN — HYDRALAZINE HYDROCHLORIDE 100 MG: 50 TABLET, FILM COATED ORAL at 20:11

## 2018-11-13 RX ADMIN — SIMVASTATIN 20 MG: 20 TABLET, FILM COATED ORAL at 20:11

## 2018-11-13 RX ADMIN — HYDROCODONE BITARTRATE AND ACETAMINOPHEN 2 TABLET: 5; 325 TABLET ORAL at 00:18

## 2018-11-13 RX ADMIN — SACUBITRIL AND VALSARTAN 1 TABLET: 24; 26 TABLET, FILM COATED ORAL at 08:42

## 2018-11-13 RX ADMIN — ISOSORBIDE MONONITRATE 60 MG: 60 TABLET ORAL at 08:42

## 2018-11-13 RX ADMIN — METOCLOPRAMIDE 10 MG: 5 TABLET ORAL at 20:09

## 2018-11-13 RX ADMIN — FLUTICASONE PROPIONATE 2 SPRAY: 50 SPRAY, METERED NASAL at 20:12

## 2018-11-13 RX ADMIN — HYDROCODONE BITARTRATE AND ACETAMINOPHEN 2 TABLET: 5; 325 TABLET ORAL at 18:10

## 2018-11-13 RX ADMIN — AMOXICILLIN AND CLAVULANATE POTASSIUM 1 TABLET: 500; 125 TABLET, FILM COATED ORAL at 08:43

## 2018-11-13 RX ADMIN — METOPROLOL SUCCINATE 25 MG: 25 TABLET, FILM COATED, EXTENDED RELEASE ORAL at 08:42

## 2018-11-13 RX ADMIN — HYDROCODONE BITARTRATE AND ACETAMINOPHEN 2 TABLET: 5; 325 TABLET ORAL at 22:12

## 2018-11-13 RX ADMIN — CLOTRIMAZOLE AND BETAMETHASONE DIPROPIONATE: 10; .5 CREAM TOPICAL at 08:41

## 2018-11-13 RX ADMIN — MOMETASONE FUROATE AND FORMOTEROL FUMARATE DIHYDRATE 2 PUFF: 200; 5 AEROSOL RESPIRATORY (INHALATION) at 08:48

## 2018-11-13 RX ADMIN — PANTOPRAZOLE SODIUM 40 MG: 40 TABLET, DELAYED RELEASE ORAL at 20:11

## 2018-11-13 RX ADMIN — SACUBITRIL AND VALSARTAN 1 TABLET: 24; 26 TABLET, FILM COATED ORAL at 20:11

## 2018-11-13 RX ADMIN — DULOXETINE HYDROCHLORIDE 60 MG: 60 CAPSULE, DELAYED RELEASE ORAL at 08:42

## 2018-11-13 RX ADMIN — FAMOTIDINE 20 MG: 20 TABLET ORAL at 08:42

## 2018-11-13 RX ADMIN — CLOPIDOGREL 75 MG: 75 TABLET, FILM COATED ORAL at 22:12

## 2018-11-13 RX ADMIN — HYDRALAZINE HYDROCHLORIDE 100 MG: 50 TABLET, FILM COATED ORAL at 18:06

## 2018-11-13 RX ADMIN — DIPHENHYDRAMINE HCL 25 MG: 25 TABLET ORAL at 08:43

## 2018-11-13 RX ADMIN — PREDNISONE 15 MG: 10 TABLET ORAL at 08:44

## 2018-11-13 RX ADMIN — Medication 10 ML: at 20:17

## 2018-11-13 RX ADMIN — DIPHENHYDRAMINE HCL 50 MG: 25 TABLET ORAL at 12:06

## 2018-11-13 RX ADMIN — GABAPENTIN 600 MG: 300 CAPSULE ORAL at 20:10

## 2018-11-13 ASSESSMENT — PAIN SCALES - GENERAL
PAINLEVEL_OUTOF10: 7
PAINLEVEL_OUTOF10: 0
PAINLEVEL_OUTOF10: 10
PAINLEVEL_OUTOF10: 7
PAINLEVEL_OUTOF10: 7
PAINLEVEL_OUTOF10: 6
PAINLEVEL_OUTOF10: 0
PAINLEVEL_OUTOF10: 9
PAINLEVEL_OUTOF10: 0
PAINLEVEL_OUTOF10: 5

## 2018-11-13 ASSESSMENT — PAIN DESCRIPTION - PAIN TYPE
TYPE: CHRONIC PAIN
TYPE: SURGICAL PAIN
TYPE: CHRONIC PAIN

## 2018-11-13 ASSESSMENT — PAIN DESCRIPTION - LOCATION
LOCATION: LEG
LOCATION: LEG;FOOT;TOE (COMMENT WHICH ONE)
LOCATION: BACK;LEG
LOCATION: BACK;LEG

## 2018-11-13 ASSESSMENT — PAIN DESCRIPTION - PROGRESSION
CLINICAL_PROGRESSION: NOT CHANGED

## 2018-11-13 ASSESSMENT — PAIN DESCRIPTION - ONSET
ONSET: GRADUAL

## 2018-11-13 ASSESSMENT — PAIN DESCRIPTION - FREQUENCY
FREQUENCY: INTERMITTENT

## 2018-11-13 ASSESSMENT — PAIN DESCRIPTION - DESCRIPTORS
DESCRIPTORS: CONSTANT;DISCOMFORT;ACHING
DESCRIPTORS: DISCOMFORT;DULL;CONSTANT
DESCRIPTORS: CONSTANT;DULL;PRESSURE

## 2018-11-13 ASSESSMENT — PAIN DESCRIPTION - ORIENTATION
ORIENTATION: RIGHT;LEFT
ORIENTATION: UPPER;MID;LOWER;LEFT;RIGHT

## 2018-11-13 ASSESSMENT — PAIN DESCRIPTION - DIRECTION
RADIATING_TOWARDS: LEGS
RADIATING_TOWARDS: LEGS

## 2018-11-13 NOTE — PROGRESS NOTES
Hospitalist Progress Note      PCP: Pipo Brambila DO    Date of Admission: 11/9/2018  Days in the hospital: 3    Chief Complaint: Toe turning purple    Hospital Course: To have a possible revascularization on Monday by vascular surgery. Complaining of neck swelling and bilateral knee and great toe pain that has been chronic. Started on benadryl and pepcid for neck swelling but did not seem to help things. Ambulated with PT and did well. Subjective  Patient seen and examined at bedside. Patient states that her great toes and her knees hurt. To have angiogram tomorrow. Patient denies any fevers, chills, chest pain, shortness of breath, nausea, vomiting. Admits to a cough.       Medications:  Reviewed    Infusion Medications    [START ON 11/13/2018] 0.9% NaCl with KCl 20 mEq      dextrose       Scheduled Medications    [START ON 11/13/2018] predniSONE  50 mg Oral Once    Followed by   Art Graham ON 11/13/2018] predniSONE  50 mg Oral Once    Followed by   Art Graham ON 11/13/2018] predniSONE  50 mg Oral Once    Followed by   Art Graham ON 11/13/2018] diphenhydrAMINE  50 mg Oral Once    diphenhydrAMINE  25 mg Oral Q6H    famotidine  20 mg Oral BID    nicotine  1 patch Transdermal Daily    sodium chloride flush  10 mL Intravenous 2 times per day    enoxaparin  40 mg Subcutaneous Daily    bumetanide  1 mg Oral See Admin Instructions    clotrimazole-betamethasone   Topical BID    dicyclomine  10 mg Oral BID    DULoxetine  60 mg Oral Daily    fluticasone  2 spray Nasal BID    gabapentin  600 mg Oral TID    hydrALAZINE  100 mg Oral TID    isosorbide mononitrate  60 mg Oral Daily    metoprolol succinate  25 mg Oral Daily    miconazole   Topical BID    mometasone-formoterol  2 puff Inhalation Q12H    montelukast  10 mg Oral Nightly    olopatadine  1 drop Both Eyes Daily    oxybutynin  5 mg Oral Nightly    pantoprazole  40 mg Oral BID    sacubitril-valsartan  1 tablet Oral BID    simvastatin 20 mg Oral Nightly    ursodiol  300 mg Oral BID    insulin lispro  0-12 Units Subcutaneous TID WC    insulin lispro  0-6 Units Subcutaneous Nightly    metoclopramide  5 mg Oral BID AC    And    metoclopramide  10 mg Oral Nightly    amoxicillin-clavulanate  1 tablet Oral TID    predniSONE  15 mg Oral Daily     PRN Meds: HYDROcodone 5 mg - acetaminophen **OR** HYDROcodone 5 mg - acetaminophen, therapeutic, sodium chloride flush, magnesium hydroxide, albuterol, diphenoxylate-atropine, lidocaine, glucose, dextrose, glucagon (rDNA), dextrose      Intake/Output Summary (Last 24 hours) at 11/12/18 2220  Last data filed at 11/12/18 1320   Gross per 24 hour   Intake              720 ml   Output             3100 ml   Net            -2380 ml       Exam:    /60   Pulse 64   Temp 97.7 °F (36.5 °C) (Temporal)   Resp 22   Ht 5' 2\" (1.575 m)   Wt 262 lb 5.6 oz (119 kg)   LMP 01/01/1990   SpO2 99%   BMI 47.98 kg/m²     General appearance:  No apparent distress, appears stated age and cooperative. Obese   HEENT:  Normal cephalic, atraumatic without obvious deformity. Pupils equal, round, and reactive to light. Extra ocular muscles intact. Conjunctivae/corneas clear. Neck: Supple, with full range of motion. No jugular venous distention. Trachea midline. Respiratory:  Normal respiratory effort. Clear to auscultation, bilaterally without Rales/Wheezes/Rhonchi. Cardiovascular:  Regular rate and rhythm with normal S1/S2 without murmurs, rubs or gallops. Abdomen: Soft, non-tender, non-distended with normal bowel sounds. Musculoskeletal:  No clubbing, cyanosis or edema bilaterally. Full range of motion without deformity. Bilateral great toe discoloration. Skin: Skin color, texture, turgor normal.  No rashes or lesions. Neurologic:  Neurovascularly intact without any focal sensory/motor deficits.  Cranial nerves: II-XII intact, grossly non-focal.  Psychiatric:  Alert and oriented, thought content appropriate,

## 2018-11-13 NOTE — PROGRESS NOTES
I talked with Rg Tabitha Kingston today.  She scheduled for an arteriogram I discussed what it entails she will be treated by my heart or Dr. Niles Bocanegra.

## 2018-11-14 ENCOUNTER — TELEPHONE (OUTPATIENT)
Dept: VASCULAR SURGERY | Age: 63
End: 2018-11-14

## 2018-11-14 VITALS
HEART RATE: 72 BPM | BODY MASS INDEX: 45.16 KG/M2 | OXYGEN SATURATION: 95 % | SYSTOLIC BLOOD PRESSURE: 145 MMHG | DIASTOLIC BLOOD PRESSURE: 73 MMHG | WEIGHT: 245.4 LBS | RESPIRATION RATE: 16 BRPM | TEMPERATURE: 97 F | HEIGHT: 62 IN

## 2018-11-14 PROBLEM — I96 GANGRENE OF TOE OF LEFT FOOT (HCC): Status: RESOLVED | Noted: 2018-11-08 | Resolved: 2018-11-14

## 2018-11-14 LAB
ANION GAP SERPL CALCULATED.3IONS-SCNC: 17 MMOL/L (ref 7–16)
BASOPHILS ABSOLUTE: 0.01 E9/L (ref 0–0.2)
BASOPHILS RELATIVE PERCENT: 0.1 % (ref 0–2)
BUN BLDV-MCNC: 28 MG/DL (ref 8–23)
CALCIUM SERPL-MCNC: 9.3 MG/DL (ref 8.6–10.2)
CHLORIDE BLD-SCNC: 98 MMOL/L (ref 98–107)
CO2: 23 MMOL/L (ref 22–29)
CREAT SERPL-MCNC: 1 MG/DL (ref 0.5–1)
EOSINOPHILS ABSOLUTE: 0 E9/L (ref 0.05–0.5)
EOSINOPHILS RELATIVE PERCENT: 0 % (ref 0–6)
GFR AFRICAN AMERICAN: >60
GFR NON-AFRICAN AMERICAN: >60 ML/MIN/1.73
GLUCOSE BLD-MCNC: 317 MG/DL (ref 74–99)
HCT VFR BLD CALC: 44.5 % (ref 34–48)
HEMOGLOBIN: 13.9 G/DL (ref 11.5–15.5)
IMMATURE GRANULOCYTES #: 0.05 E9/L
IMMATURE GRANULOCYTES %: 0.4 % (ref 0–5)
LYMPHOCYTES ABSOLUTE: 0.97 E9/L (ref 1.5–4)
LYMPHOCYTES RELATIVE PERCENT: 7.3 % (ref 20–42)
MCH RBC QN AUTO: 31.4 PG (ref 26–35)
MCHC RBC AUTO-ENTMCNC: 31.2 % (ref 32–34.5)
MCV RBC AUTO: 100.5 FL (ref 80–99.9)
METER GLUCOSE: 248 MG/DL (ref 74–99)
METER GLUCOSE: 303 MG/DL (ref 74–99)
MONOCYTES ABSOLUTE: 0.87 E9/L (ref 0.1–0.95)
MONOCYTES RELATIVE PERCENT: 6.5 % (ref 2–12)
NEUTROPHILS ABSOLUTE: 11.45 E9/L (ref 1.8–7.3)
NEUTROPHILS RELATIVE PERCENT: 85.7 % (ref 43–80)
PDW BLD-RTO: 12.6 FL (ref 11.5–15)
PLATELET # BLD: 211 E9/L (ref 130–450)
PMV BLD AUTO: 11.5 FL (ref 7–12)
POTASSIUM REFLEX MAGNESIUM: 5.2 MMOL/L (ref 3.5–5)
RBC # BLD: 4.43 E12/L (ref 3.5–5.5)
SODIUM BLD-SCNC: 138 MMOL/L (ref 132–146)
WBC # BLD: 13.4 E9/L (ref 4.5–11.5)

## 2018-11-14 PROCEDURE — 36415 COLL VENOUS BLD VENIPUNCTURE: CPT

## 2018-11-14 PROCEDURE — 82962 GLUCOSE BLOOD TEST: CPT

## 2018-11-14 PROCEDURE — 6370000000 HC RX 637 (ALT 250 FOR IP): Performed by: SURGERY

## 2018-11-14 PROCEDURE — 94660 CPAP INITIATION&MGMT: CPT

## 2018-11-14 PROCEDURE — 6370000000 HC RX 637 (ALT 250 FOR IP): Performed by: INTERNAL MEDICINE

## 2018-11-14 PROCEDURE — 80048 BASIC METABOLIC PNL TOTAL CA: CPT

## 2018-11-14 PROCEDURE — 2580000003 HC RX 258: Performed by: STUDENT IN AN ORGANIZED HEALTH CARE EDUCATION/TRAINING PROGRAM

## 2018-11-14 PROCEDURE — 85025 COMPLETE CBC W/AUTO DIFF WBC: CPT

## 2018-11-14 PROCEDURE — 6360000002 HC RX W HCPCS: Performed by: STUDENT IN AN ORGANIZED HEALTH CARE EDUCATION/TRAINING PROGRAM

## 2018-11-14 RX ORDER — HYDROCODONE BITARTRATE AND ACETAMINOPHEN 5; 325 MG/1; MG/1
2 TABLET ORAL EVERY 6 HOURS PRN
Qty: 15 TABLET | Refills: 0 | Status: SHIPPED | OUTPATIENT
Start: 2018-11-14 | End: 2018-11-21

## 2018-11-14 RX ORDER — BLOOD SUGAR DIAGNOSTIC
1 STRIP MISCELLANEOUS DAILY
Qty: 100 EACH | Refills: 11 | Status: SHIPPED | OUTPATIENT
Start: 2018-11-14 | End: 2020-01-21 | Stop reason: SDUPTHER

## 2018-11-14 RX ORDER — AMOXICILLIN AND CLAVULANATE POTASSIUM 500; 125 MG/1; MG/1
1 TABLET, FILM COATED ORAL 3 TIMES DAILY
Qty: 30 TABLET | Refills: 0 | Status: SHIPPED | OUTPATIENT
Start: 2018-11-14 | End: 2018-11-24

## 2018-11-14 RX ORDER — CLOPIDOGREL BISULFATE 75 MG/1
75 TABLET ORAL DAILY
Qty: 30 TABLET | Refills: 3 | Status: SHIPPED | OUTPATIENT
Start: 2018-11-15 | End: 2019-06-26 | Stop reason: SDUPTHER

## 2018-11-14 RX ADMIN — ENOXAPARIN SODIUM 40 MG: 40 INJECTION SUBCUTANEOUS at 09:16

## 2018-11-14 RX ADMIN — HYDRALAZINE HYDROCHLORIDE 100 MG: 50 TABLET, FILM COATED ORAL at 08:46

## 2018-11-14 RX ADMIN — PANTOPRAZOLE SODIUM 40 MG: 40 TABLET, DELAYED RELEASE ORAL at 08:31

## 2018-11-14 RX ADMIN — GABAPENTIN 600 MG: 300 CAPSULE ORAL at 08:34

## 2018-11-14 RX ADMIN — AMOXICILLIN AND CLAVULANATE POTASSIUM 1 TABLET: 500; 125 TABLET, FILM COATED ORAL at 12:59

## 2018-11-14 RX ADMIN — AMOXICILLIN AND CLAVULANATE POTASSIUM 1 TABLET: 500; 125 TABLET, FILM COATED ORAL at 08:36

## 2018-11-14 RX ADMIN — ASPIRIN 81 MG 81 MG: 81 TABLET ORAL at 08:31

## 2018-11-14 RX ADMIN — ISOSORBIDE MONONITRATE 60 MG: 60 TABLET ORAL at 08:31

## 2018-11-14 RX ADMIN — PREDNISONE 15 MG: 10 TABLET ORAL at 08:31

## 2018-11-14 RX ADMIN — DIPHENHYDRAMINE HCL 25 MG: 25 TABLET ORAL at 08:31

## 2018-11-14 RX ADMIN — HYDROCODONE BITARTRATE AND ACETAMINOPHEN 2 TABLET: 5; 325 TABLET ORAL at 05:45

## 2018-11-14 RX ADMIN — SACUBITRIL AND VALSARTAN 1 TABLET: 24; 26 TABLET, FILM COATED ORAL at 08:36

## 2018-11-14 RX ADMIN — FLUTICASONE PROPIONATE 2 SPRAY: 50 SPRAY, METERED NASAL at 08:40

## 2018-11-14 RX ADMIN — INSULIN LISPRO 4 UNITS: 100 INJECTION, SOLUTION INTRAVENOUS; SUBCUTANEOUS at 11:57

## 2018-11-14 RX ADMIN — HYDROCODONE BITARTRATE AND ACETAMINOPHEN 2 TABLET: 5; 325 TABLET ORAL at 15:06

## 2018-11-14 RX ADMIN — Medication: at 10:45

## 2018-11-14 RX ADMIN — DICYCLOMINE HYDROCHLORIDE 10 MG: 10 CAPSULE ORAL at 08:34

## 2018-11-14 RX ADMIN — Medication 10 ML: at 08:30

## 2018-11-14 RX ADMIN — FAMOTIDINE 20 MG: 20 TABLET ORAL at 08:34

## 2018-11-14 RX ADMIN — CLOTRIMAZOLE AND BETAMETHASONE DIPROPIONATE: 10; .5 CREAM TOPICAL at 08:41

## 2018-11-14 RX ADMIN — CLOPIDOGREL 75 MG: 75 TABLET, FILM COATED ORAL at 08:34

## 2018-11-14 RX ADMIN — METOCLOPRAMIDE 5 MG: 5 TABLET ORAL at 08:41

## 2018-11-14 RX ADMIN — MICONAZOLE NITRATE: 20 CREAM TOPICAL at 08:41

## 2018-11-14 RX ADMIN — DULOXETINE HYDROCHLORIDE 60 MG: 60 CAPSULE, DELAYED RELEASE ORAL at 08:34

## 2018-11-14 RX ADMIN — HYDROCODONE BITARTRATE AND ACETAMINOPHEN 2 TABLET: 5; 325 TABLET ORAL at 10:42

## 2018-11-14 RX ADMIN — METOPROLOL SUCCINATE 25 MG: 25 TABLET, FILM COATED, EXTENDED RELEASE ORAL at 08:31

## 2018-11-14 RX ADMIN — URSODIOL 300 MG: 300 CAPSULE ORAL at 08:44

## 2018-11-14 RX ADMIN — OLOPATADINE HYDROCHLORIDE 1 DROP: 2 SOLUTION/ DROPS OPHTHALMIC at 08:40

## 2018-11-14 RX ADMIN — MOMETASONE FUROATE AND FORMOTEROL FUMARATE DIHYDRATE 2 PUFF: 200; 5 AEROSOL RESPIRATORY (INHALATION) at 08:40

## 2018-11-14 RX ADMIN — INSULIN LISPRO 8 UNITS: 100 INJECTION, SOLUTION INTRAVENOUS; SUBCUTANEOUS at 09:16

## 2018-11-14 RX ADMIN — GABAPENTIN 600 MG: 300 CAPSULE ORAL at 12:59

## 2018-11-14 RX ADMIN — HYDRALAZINE HYDROCHLORIDE 100 MG: 50 TABLET, FILM COATED ORAL at 13:00

## 2018-11-14 ASSESSMENT — PAIN DESCRIPTION - PAIN TYPE
TYPE: CHRONIC PAIN
TYPE: CHRONIC PAIN
TYPE: ACUTE PAIN

## 2018-11-14 ASSESSMENT — PAIN DESCRIPTION - ORIENTATION
ORIENTATION: MID;UPPER
ORIENTATION: MID;UPPER
ORIENTATION: LEFT

## 2018-11-14 ASSESSMENT — PAIN DESCRIPTION - DESCRIPTORS
DESCRIPTORS: ACHING;DISCOMFORT;CONSTANT
DESCRIPTORS: CONSTANT;DULL;PRESSURE

## 2018-11-14 ASSESSMENT — PAIN DESCRIPTION - ONSET
ONSET: ON-GOING
ONSET: ON-GOING

## 2018-11-14 ASSESSMENT — PAIN SCALES - GENERAL
PAINLEVEL_OUTOF10: 10

## 2018-11-14 ASSESSMENT — PAIN DESCRIPTION - LOCATION
LOCATION: BACK;LEG
LOCATION: TOE (COMMENT WHICH ONE)
LOCATION: BACK;LEG

## 2018-11-14 ASSESSMENT — PAIN DESCRIPTION - FREQUENCY
FREQUENCY: INTERMITTENT
FREQUENCY: CONTINUOUS

## 2018-11-14 ASSESSMENT — PAIN DESCRIPTION - PROGRESSION
CLINICAL_PROGRESSION: NOT CHANGED
CLINICAL_PROGRESSION: GRADUALLY IMPROVING

## 2018-11-14 NOTE — PROGRESS NOTES
Vascular Surgery Progress Note    Pt being seen in f/u regarding PVD    Subjective  Pt s/e. Denies sob or cp. Mild right groin soreness. Left feet feel better but she is still having pain in her left first toe. Tolerating PO. Adequate UO overnight.     Current Medications:    dextrose        HYDROcodone 5 mg - acetaminophen **OR** HYDROcodone 5 mg - acetaminophen, therapeutic, sodium chloride flush, magnesium hydroxide, albuterol, diphenoxylate-atropine, lidocaine, glucose, dextrose, glucagon (rDNA), dextrose    aspirin  81 mg Oral Daily    clopidogrel  75 mg Oral Daily    diphenhydrAMINE  25 mg Oral Q6H    famotidine  20 mg Oral BID    nicotine  1 patch Transdermal Daily    sodium chloride flush  10 mL Intravenous 2 times per day    enoxaparin  40 mg Subcutaneous Daily    bumetanide  1 mg Oral See Admin Instructions    clotrimazole-betamethasone   Topical BID    dicyclomine  10 mg Oral BID    DULoxetine  60 mg Oral Daily    fluticasone  2 spray Nasal BID    gabapentin  600 mg Oral TID    hydrALAZINE  100 mg Oral TID    isosorbide mononitrate  60 mg Oral Daily    metoprolol succinate  25 mg Oral Daily    miconazole   Topical BID    mometasone-formoterol  2 puff Inhalation Q12H    montelukast  10 mg Oral Nightly    olopatadine  1 drop Both Eyes Daily    oxybutynin  5 mg Oral Nightly    pantoprazole  40 mg Oral BID    sacubitril-valsartan  1 tablet Oral BID    simvastatin  20 mg Oral Nightly    ursodiol  300 mg Oral BID    insulin lispro  0-12 Units Subcutaneous TID WC    insulin lispro  0-6 Units Subcutaneous Nightly    metoclopramide  5 mg Oral BID AC    And    metoclopramide  10 mg Oral Nightly    amoxicillin-clavulanate  1 tablet Oral TID    predniSONE  15 mg Oral Daily        PHYSICAL EXAM:    BP (!) 145/73   Pulse 72   Temp 97 °F (36.1 °C) (Temporal)   Resp 16   Ht 5' 2\" (1.575 m)   Wt 245 lb 6.4 oz (111.3 kg)   LMP 01/01/1990   SpO2 95%   BMI 44.88 kg/m²

## 2018-11-14 NOTE — CARE COORDINATION
11/14/2018 social work:discharge planning   Initial assessment done with patient. Patient patient lives by herself and is active with [facility] and has aid services. Patient has a ww, trilogy that has 02 from [place] ( verified this with jorje there) Discussed discharge plan/ transition of care and sw role. Plan is home with [facility] resuming. dtr will bring home, will need [place] [phone] orders.

## 2018-11-14 NOTE — TELEPHONE ENCOUNTER
----- Message from Kristina Amezcua MD sent at 11/14/2018  2:04 PM EST -----  Richie Mcclendon in 2-3 weeks    [facility]

## 2018-11-15 ENCOUNTER — CARE COORDINATION (OUTPATIENT)
Dept: CARE COORDINATION | Age: 63
End: 2018-11-15

## 2018-11-15 NOTE — CARE COORDINATION
[facility] Transitions Initial Follow Up Call    Call within 2 business days of discharge: Yes    Patient: Duarte Cherry Patient : 1955   MRN: [id number]  Reason for Admission: There are no discharge diagnoses documented for the most recent discharge. Discharge Date: 18 RARS: Readmission Risk Score: 46  Spoke with patient at [phone] (H)(M) introduced myself to patient calling to see how patient is doing after being discharged from the hospital, review patient's allergies, medications patient is taking, see if there is anything patient needs and schedule patient for a Transitional Care visit within 7 days. Patient acknowledged states is not able to talk at the moment. RN TCC asked if patient would please return the call today or tomorrow. Patient agreed,provided contact information.     Spoke with: Patient    Facility: Varsha Sanchez RN

## 2018-11-20 ENCOUNTER — TELEPHONE (OUTPATIENT)
Dept: FAMILY MEDICINE CLINIC | Age: 63
End: 2018-11-20

## 2018-11-20 RX ORDER — DULOXETIN HYDROCHLORIDE 60 MG/1
CAPSULE, DELAYED RELEASE ORAL
Qty: 180 CAPSULE | Refills: 1 | Status: SHIPPED | OUTPATIENT
Start: 2018-11-20 | End: 2019-06-26 | Stop reason: SDUPTHER

## 2018-11-20 RX ORDER — OXYBUTYNIN CHLORIDE 5 MG/1
5 TABLET, EXTENDED RELEASE ORAL NIGHTLY
Qty: 30 TABLET | Refills: 3 | Status: SHIPPED | OUTPATIENT
Start: 2018-11-20 | End: 2018-11-21

## 2018-11-21 ENCOUNTER — OFFICE VISIT (OUTPATIENT)
Dept: FAMILY MEDICINE CLINIC | Age: 63
End: 2018-11-21
Payer: MEDICARE

## 2018-11-21 VITALS
RESPIRATION RATE: 16 BRPM | SYSTOLIC BLOOD PRESSURE: 126 MMHG | TEMPERATURE: 97.4 F | HEART RATE: 93 BPM | WEIGHT: 249 LBS | HEIGHT: 62 IN | DIASTOLIC BLOOD PRESSURE: 65 MMHG | OXYGEN SATURATION: 96 % | BODY MASS INDEX: 45.82 KG/M2

## 2018-11-21 DIAGNOSIS — Z09 HOSPITAL DISCHARGE FOLLOW-UP: Primary | ICD-10-CM

## 2018-11-21 DIAGNOSIS — I70.222 ATHEROSCLEROSIS OF NATIVE ARTERIES OF EXTREMITIES WITH REST PAIN, LEFT LEG (HCC): Chronic | ICD-10-CM

## 2018-11-21 DIAGNOSIS — Z79.4 TYPE 2 DIABETES MELLITUS WITH COMPLICATION, WITH LONG-TERM CURRENT USE OF INSULIN (HCC): Chronic | ICD-10-CM

## 2018-11-21 DIAGNOSIS — E11.8 TYPE 2 DIABETES MELLITUS WITH COMPLICATION, WITH LONG-TERM CURRENT USE OF INSULIN (HCC): Chronic | ICD-10-CM

## 2018-11-21 DIAGNOSIS — I73.9 PVD (PERIPHERAL VASCULAR DISEASE) (HCC): ICD-10-CM

## 2018-11-21 PROCEDURE — 99495 TRANSJ CARE MGMT MOD F2F 14D: CPT | Performed by: FAMILY MEDICINE

## 2018-11-21 PROCEDURE — 1111F DSCHRG MED/CURRENT MED MERGE: CPT | Performed by: FAMILY MEDICINE

## 2018-11-21 RX ORDER — LIDOCAINE 50 MG/G
OINTMENT TOPICAL
Qty: 1 TUBE | Refills: 2 | Status: SHIPPED | OUTPATIENT
Start: 2018-11-21 | End: 2018-12-21 | Stop reason: SDUPTHER

## 2018-11-21 NOTE — DISCHARGE SUMMARY
Hospital Medicine Discharge Summary    Patient ID: Nomi Luz      Patient's PCP: Yolanda Pena DO    Admit Date: 11/9/2018     Discharge Date: 11/14/2018    Admitting Physician: Corinne Simmonds, MD     Discharge Physician: Angela Sam MD     Discharge Diagnoses: Active Hospital Problems    Diagnosis Date Noted    Atherosclerosis of native arteries of extremities with rest pain, left leg ([address] [I70.222] 11/09/2018       The patient was seen and examined on day of discharge and this discharge summary is in conjunction with any daily progress note from day of discharge. Hospital Course: 59 yo admitted with gangrene of the great toe of L foot underwent vascular eval and L sfa, pop, tp trunk artherectomy and was deemed stable for discharge on 11/14. Pt did well with PT/OT and was able to dc home with [facility] services. Exam:     BP (!) 145/73   Pulse 72   Temp 97 °F (36.1 °C) (Temporal)   Resp 16   Ht 5' 2\" (1.575 m)   Wt 245 lb 6.4 oz (111.3 kg)   LMP 01/01/1990   SpO2 95%   BMI 44.88 kg/m²     General appearance: No apparent distress, appears stated age and cooperative. HEENT: Pupils equal, round, and reactive to light. Conjunctivae/corneas clear. Neck: Supple, with full range of motion. No jugular venous distention. Trachea midline. Respiratory:  Normal respiratory effort. Clear to auscultation, bilaterally without Rales/Wheezes/Rhonchi. Cardiovascular: Regular rate and rhythm with normal S1/S2 without murmurs, rubs or gallops. Abdomen: Soft, non-tender, non-distended with normal bowel sounds. Musculoskeletal: No clubbing, cyanosis or edema bilaterally. Full range of motion without deformity. Skin: Skin color, texture, turgor normal.  No rashes or lesions. Neurologic:  Neurovascularly intact without any focal sensory/motor deficits.  Cranial nerves: II-XII intact, grossly non-focal.  Psychiatric: Alert and oriented, thought content appropriate, normal insight      Consults: bumetanide (BUMEX) 1 MG tablet Take 1 mg by mouth See Admin Instructions DR. Farhan Khan stopped the bumex while on the prednisoneHistorical Med      oxybutynin (DITROPAN-XL) 5 MG extended release tablet Take 5 mg by mouth nightlyHistorical Med      Nutritional Supplements (GLUCOSE MANAGEMENT) TABS Use daily prn if low glucose <60, Disp-30 tablet, R-2Normal      Insulin Pen Needle (B-D UF III MINI PEN NEEDLES) 31G X 5 MM MISC DAILY Starting Wed 10/24/2018, Disp-100 each, R-3, Normal      clotrimazole-betamethasone (LOTRISONE) 1-0.05 % cream Apply topically 2 times daily. , Disp-1 Tube, R-0, Normal      insulin lispro (HUMALOG KWIKPEN) 100 UNIT/ML pen Inject 35 units tid before meals, Disp-5 pen, R-3Normal      hydrALAZINE (APRESOLINE) 100 MG tablet Take 1 tablet by mouth 3 times daily, Disp-270 tablet, R-3Normal      isosorbide mononitrate (IMDUR) 60 MG extended release tablet Take 1 tablet by mouth daily, Disp-90 tablet, R-3Normal      metoprolol succinate (TOPROL XL) 25 MG extended release tablet Take 1 tablet by mouth daily, Disp-90 tablet, R-3Normal      sacubitril-valsartan (ENTRESTO) 24-26 MG per tablet Take 1 tablet by mouth 2 times daily, Disp-180 tablet, R-3Normal      simvastatin (ZOCOR) 20 MG tablet Take 1 tablet by mouth nightly, Disp-90 tablet, R-3Normal      nitroGLYCERIN (NITROSTAT) 0.4 MG SL tablet up to max of 3 total doses. If no relief after 1 dose, call 911., Disp-25 tablet, R-3Normal      vitamin D (ERGOCALCIFEROL) 93125 units CAPS capsule Take 1 capsule by mouth once a week On Sundays, Disp-12 capsule, R-0Normal      miconazole (MICOTIN) 2 % cream Apply topically 2 times daily. , Disp-1 Tube, R-1, Normal      DULoxetine (CYMBALTA) 60 MG extended release capsule TAKE ONE CAPSULE BY MOUTH TWICE A DAY, Disp-180 capsule, R-0Normal      Insulin Degludec (TRESIBA FLEXTOUCH) 200 UNIT/ML SOPN Inject 70 Units into the skin 2 times daily, Disp-15 pen, R-2Normal      dicyclomine (BENTYL) 20 MG tablet Take 10 mg by mouth 2 times dailyHistorical Med      diphenoxylate-atropine (LOMOTIL) 2.5-0.025 MG per tablet Take 1 tablet by mouth 4 times daily as needed for Diarrhea. .Historical Med      Omega-3 Fatty Acids (FISH OIL OMEGA-3 PO) Take 1,000 mg by mouth daily Historical Med      lidocaine (XYLOCAINE) 5 % ointment Apply topically as needed for Pain Apply topically as needed., Topical, PRN, Historical Med      ursodiol (ACTIGALL) 300 MG capsule Take 300 mg by mouth 2 times dailyHistorical Med      ondansetron (ZOFRAN) 4 MG tablet Take 4 mg by mouth every 8 hours as needed for Nausea or VomitingHistorical Med      mometasone-formoterol (DULERA) 200-5 MCG/ACT inhaler Inhale 2 puffs into the lungs every 12 hoursHistorical Med      Multiple Vitamins-Minerals (THERAPEUTIC MULTIVITAMIN-MINERALS) tablet Take 1 tablet by mouth dailyHistorical Med      albuterol (PROVENTIL) (2.5 MG/3ML) 0.083% nebulizer solution Take 2.5 mg by nebulization every 6 hours as needed for WheezingHistorical Med      acetaminophen (TYLENOL) 500 MG tablet Take 1,000 mg by mouth every 6 hours as needed for PainHistorical Med      cetirizine (ZYRTEC) 10 MG tablet Take 10 mg by mouth dailyHistorical Med      !! ACCU-CHEK FASTCLIX LANCETS MISC 5 TIMES DAILY, Historical Med      blood glucose test strips (ACCU-CHEK COMPACT TEST DRUM) strip DAILY Starting Thu 6/21/2018, Disp-100 each, R-3, NormalAs needed.      !! SOFT TOUCH LANCETS MISC Disp-100 each, R-3, NormalTest 3 times daily      pantoprazole (PROTONIX) 40 MG tablet TAKE 1 TABLET BY MOUTH TWICE A DAY, R-2Historical Med      montelukast (SINGULAIR) 10 MG tablet Take 10 mg by mouth nightly Historical Med      aspirin 81 MG chewable tablet TAKE 1 TABLET BY MOUTH DAILY, R-0Historical Med      fluticasone (FLONASE) 50 MCG/ACT nasal spray 1 spray by Nasal route 2 times daily, Disp-1 Bottle, R-3Normal      BiPAP Machine MISC NIGHTLY, Historical Med27/23        ! ! - Potential duplicate medications found.  Please

## 2018-11-21 NOTE — PROGRESS NOTES
Pulse: 99 93   Resp: 16    Temp: 97.4 °F (36.3 °C)    TempSrc: Temporal    SpO2: 96%    Weight: 249 lb (112.9 kg)    Height: 5' 2\" (1.575 m)      Body mass index is 45.54 kg/m². Wt Readings from Last 3 Encounters:   11/21/18 249 lb (112.9 kg)   11/13/18 245 lb 6.4 oz (111.3 kg)   11/09/18 253 lb 8 oz (115 kg)     BP Readings from Last 3 Encounters:   11/21/18 126/65   11/14/18 (!) 145/73   11/09/18 (!) 146/64       Physical Exam   Constitutional: She is oriented to person, place, and time. She appears well-developed and well-nourished. Uses walker   HENT:   Head: Normocephalic and atraumatic. Right Ear: External ear normal.   Left Ear: External ear normal.   Nose: Nose normal.   Mouth/Throat: Oropharynx is clear and moist.   Eyes: Pupils are equal, round, and reactive to light. Conjunctivae and EOM are normal.   Neck: Normal range of motion. Neck supple. No JVD present. Cardiovascular: Normal rate, regular rhythm and normal heart sounds. No murmur heard. No edema, pvd changes noted on LE   Pulmonary/Chest: Effort normal. No respiratory distress. She has no wheezes. She has no rales. Rhonchi noted bilateral lower lobes   Abdominal: Soft. Bowel sounds are normal. There is no tenderness. Musculoskeletal: She exhibits tenderness (lumbar, cervical spinal musculatrue). Uses walker  Left toe is purple in color (no change since last evaluation). Right toe does not have any changes. Both toes are very painful to palpation and touch. Lymphadenopathy:     She has no cervical adenopathy. Neurological: She is alert and oriented to person, place, and time. Coordination normal.   Skin: Skin is warm and dry. No rash noted. Psychiatric: She has a normal mood and affect. Her behavior is normal.   Nursing note and vitals reviewed. Assessment/Plan:  1. Hospital discharge follow-up  Discussed need to stop smoking. Patient is agreeable. No narcotics can be prescribed as patient dose smoke marijuana.    -

## 2018-11-24 ASSESSMENT — ENCOUNTER SYMPTOMS
NAUSEA: 0
ABDOMINAL PAIN: 0
VOMITING: 0
SHORTNESS OF BREATH: 0
SINUS PRESSURE: 0
SORE THROAT: 0
EYE PAIN: 0
CONSTIPATION: 0
RHINORRHEA: 0
DIARRHEA: 0
COUGH: 1
BACK PAIN: 0
SINUS PAIN: 0
TROUBLE SWALLOWING: 0

## 2018-11-26 ENCOUNTER — TELEPHONE (OUTPATIENT)
Dept: FAMILY MEDICINE CLINIC | Age: 63
End: 2018-11-26

## 2018-11-26 RX ORDER — FLUCONAZOLE 150 MG/1
150 TABLET ORAL ONCE
Qty: 1 TABLET | Refills: 0 | Status: SHIPPED | OUTPATIENT
Start: 2018-11-26 | End: 2018-11-26

## 2018-11-26 NOTE — TELEPHONE ENCOUNTER
Diflucan x1 sent in. Please inform if no improvemetn with 1 dose and after 3 days would need to be seen in office.

## 2018-11-29 ENCOUNTER — TELEPHONE (OUTPATIENT)
Dept: FAMILY MEDICINE CLINIC | Age: 63
End: 2018-11-29

## 2018-11-29 RX ORDER — CETIRIZINE HYDROCHLORIDE 10 MG/1
10 TABLET ORAL DAILY
Status: CANCELLED | OUTPATIENT
Start: 2018-11-29

## 2018-12-01 RX ORDER — BENZONATATE 100 MG/1
100 CAPSULE ORAL 2 TIMES DAILY PRN
Qty: 20 CAPSULE | Refills: 0 | Status: SHIPPED | OUTPATIENT
Start: 2018-12-01 | End: 2018-12-08

## 2018-12-03 ENCOUNTER — TELEPHONE (OUTPATIENT)
Dept: FAMILY MEDICINE CLINIC | Age: 63
End: 2018-12-03

## 2018-12-12 ENCOUNTER — TELEPHONE (OUTPATIENT)
Dept: FAMILY MEDICINE CLINIC | Age: 63
End: 2018-12-12

## 2018-12-12 DIAGNOSIS — I70.90 ARTERIAL ATHEROSCLEROSIS: Primary | ICD-10-CM

## 2018-12-24 RX ORDER — OXYBUTYNIN CHLORIDE 10 MG/1
TABLET, EXTENDED RELEASE ORAL
Qty: 30 TABLET | Refills: 11 | Status: SHIPPED | OUTPATIENT
Start: 2018-12-24 | End: 2019-01-09

## 2018-12-24 RX ORDER — LIDOCAINE 50 MG/G
OINTMENT TOPICAL
Qty: 35.44 G | Refills: 11 | Status: SHIPPED | OUTPATIENT
Start: 2018-12-24 | End: 2019-06-26 | Stop reason: SDUPTHER

## 2019-01-04 ENCOUNTER — APPOINTMENT (OUTPATIENT)
Dept: CT IMAGING | Age: 64
End: 2019-01-04
Payer: MEDICARE

## 2019-01-04 ENCOUNTER — APPOINTMENT (OUTPATIENT)
Dept: GENERAL RADIOLOGY | Age: 64
End: 2019-01-04
Payer: MEDICARE

## 2019-01-04 ENCOUNTER — HOSPITAL ENCOUNTER (EMERGENCY)
Age: 64
Discharge: HOME | End: 2019-01-05
Attending: EMERGENCY MEDICINE
Payer: MEDICARE

## 2019-01-04 DIAGNOSIS — M54.50 ACUTE RIGHT-SIDED LOW BACK PAIN WITHOUT SCIATICA: ICD-10-CM

## 2019-01-04 DIAGNOSIS — S70.01XA CONTUSION OF RIGHT HIP, INITIAL ENCOUNTER: ICD-10-CM
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
## 2019-01-04 LAB
ANION GAP SERPL CALCULATED.3IONS-SCNC: 9 MMOL/L (ref 7–16)
BACTERIA: ABNORMAL /HPF
BASOPHILS ABSOLUTE: 0.02 E9/L (ref 0–0.2)
BASOPHILS RELATIVE PERCENT: 0.2 % (ref 0–2)
BETA-HYDROXYBUTYRATE: 0.12 MMOL/L (ref 0.02–0.27)
BILIRUBIN URINE: NEGATIVE
BLOOD, URINE: NEGATIVE
BUN BLDV-MCNC: 14 MG/DL (ref 8–23)
CALCIUM SERPL-MCNC: 9 MG/DL (ref 8.6–10.2)
CHLORIDE BLD-SCNC: 104 MMOL/L (ref 98–107)
CLARITY: CLEAR
CO2: 30 MMOL/L (ref 22–29)
COLOR: YELLOW
CREAT SERPL-MCNC: 1.1 MG/DL (ref 0.5–1)
EOSINOPHILS ABSOLUTE: 0.14 E9/L (ref 0.05–0.5)
EOSINOPHILS RELATIVE PERCENT: 1.7 % (ref 0–6)
EPITHELIAL CELLS, UA: ABNORMAL /HPF
GFR AFRICAN AMERICAN: >60
GFR NON-AFRICAN AMERICAN: >60 ML/MIN/1.73
GLUCOSE BLD-MCNC: 116 MG/DL (ref 74–99)
GLUCOSE URINE: NEGATIVE MG/DL
HCT VFR BLD CALC: 41.5 % (ref 34–48)
HEMOGLOBIN: 13.1 G/DL (ref 11.5–15.5)
IMMATURE GRANULOCYTES #: 0.03 E9/L
IMMATURE GRANULOCYTES %: 0.4 % (ref 0–5)
INFLUENZA A BY PCR: NOT DETECTED
INFLUENZA B BY PCR: NOT DETECTED
INR BLD: 1
KETONES, URINE: ABNORMAL MG/DL
LACTIC ACID: 1.3 MMOL/L (ref 0.5–2.2)
LEUKOCYTE ESTERASE, URINE: ABNORMAL
LYMPHOCYTES ABSOLUTE: 1.36 E9/L (ref 1.5–4)
LYMPHOCYTES RELATIVE PERCENT: 16.4 % (ref 20–42)
MCH RBC QN AUTO: 31.6 PG (ref 26–35)
MCHC RBC AUTO-ENTMCNC: 31.6 % (ref 32–34.5)
MCV RBC AUTO: 100 FL (ref 80–99.9)
MONOCYTES ABSOLUTE: 0.3 E9/L (ref 0.1–0.95)
MONOCYTES RELATIVE PERCENT: 3.6 % (ref 2–12)
MUCUS: PRESENT
NEUTROPHILS ABSOLUTE: 6.44 E9/L (ref 1.8–7.3)
NEUTROPHILS RELATIVE PERCENT: 77.7 % (ref 43–80)
NITRITE, URINE: NEGATIVE
PDW BLD-RTO: 13.2 FL (ref 11.5–15)
PH UA: 6.5 (ref 5–9)
PLATELET # BLD: 304 E9/L (ref 130–450)
PMV BLD AUTO: 10.5 FL (ref 7–12)
POTASSIUM SERPL-SCNC: 3.9 MMOL/L (ref 3.5–5)
PROTEIN UA: NEGATIVE MG/DL
PROTHROMBIN TIME: 11.7 SEC (ref 9.3–12.4)
RBC # BLD: 4.15 E12/L (ref 3.5–5.5)
RBC UA: ABNORMAL /HPF (ref 0–2)
SODIUM BLD-SCNC: 143 MMOL/L (ref 132–146)
SPECIFIC GRAVITY UA: 1.02 (ref 1–1.03)
UROBILINOGEN, URINE: 0.2 E.U./DL
WBC # BLD: 8.3 E9/L (ref 4.5–11.5)
WBC UA: ABNORMAL /HPF (ref 0–5)

## 2019-01-04 PROCEDURE — 72170 X-RAY EXAM OF PELVIS: CPT

## 2019-01-04 PROCEDURE — 80048 BASIC METABOLIC PNL TOTAL CA: CPT

## 2019-01-04 PROCEDURE — 87502 INFLUENZA DNA AMP PROBE: CPT

## 2019-01-04 PROCEDURE — 72125 CT NECK SPINE W/O DYE: CPT

## 2019-01-04 PROCEDURE — 71045 X-RAY EXAM CHEST 1 VIEW: CPT

## 2019-01-04 PROCEDURE — 85025 COMPLETE CBC W/AUTO DIFF WBC: CPT

## 2019-01-04 PROCEDURE — 99284 EMERGENCY DEPT VISIT MOD MDM: CPT

## 2019-01-04 PROCEDURE — 83605 ASSAY OF LACTIC ACID: CPT

## 2019-01-04 PROCEDURE — 82010 KETONE BODYS QUAN: CPT

## 2019-01-04 PROCEDURE — 87633 RESP VIRUS 12-25 TARGETS: CPT

## 2019-01-04 PROCEDURE — 87088 URINE BACTERIA CULTURE: CPT

## 2019-01-04 PROCEDURE — 87486 CHLMYD PNEUM DNA AMP PROBE: CPT

## 2019-01-04 PROCEDURE — 85610 PROTHROMBIN TIME: CPT

## 2019-01-04 PROCEDURE — 36415 COLL VENOUS BLD VENIPUNCTURE: CPT

## 2019-01-04 PROCEDURE — 81001 URINALYSIS AUTO W/SCOPE: CPT

## 2019-01-04 PROCEDURE — 87040 BLOOD CULTURE FOR BACTERIA: CPT

## 2019-01-04 PROCEDURE — 73502 X-RAY EXAM HIP UNI 2-3 VIEWS: CPT

## 2019-01-04 PROCEDURE — 74176 CT ABD & PELVIS W/O CONTRAST: CPT

## 2019-01-04 PROCEDURE — 87798 DETECT AGENT NOS DNA AMP: CPT

## 2019-01-04 PROCEDURE — 87581 M.PNEUMON DNA AMP PROBE: CPT

## 2019-01-04 PROCEDURE — 70450 CT HEAD/BRAIN W/O DYE: CPT

## 2019-01-04 PROCEDURE — 2580000003 HC RX 258: Performed by: EMERGENCY MEDICINE

## 2019-01-04 RX ORDER — 0.9 % SODIUM CHLORIDE 0.9 %
1000 INTRAVENOUS SOLUTION INTRAVENOUS ONCE
Status: COMPLETED | OUTPATIENT
Start: 2019-01-04 | End: 2019-01-05

## 2019-01-04 RX ORDER — IBUPROFEN 800 MG/1
800 TABLET ORAL ONCE
Status: DISCONTINUED | OUTPATIENT
Start: 2019-01-04 | End: 2019-01-05 | Stop reason: HOSPADM

## 2019-01-04 RX ADMIN — SODIUM CHLORIDE 1000 ML: 9 INJECTION, SOLUTION INTRAVENOUS at 22:23

## 2019-01-04 ASSESSMENT — PAIN DESCRIPTION - ORIENTATION: ORIENTATION: RIGHT

## 2019-01-04 ASSESSMENT — ENCOUNTER SYMPTOMS
EYE PAIN: 0
EYE REDNESS: 0
ABDOMINAL DISTENTION: 1
DIARRHEA: 0
SINUS PRESSURE: 0
SORE THROAT: 0
NAUSEA: 0
VOMITING: 0
WHEEZING: 0
COUGH: 1
EYE DISCHARGE: 0
SHORTNESS OF BREATH: 1
BACK PAIN: 0

## 2019-01-04 ASSESSMENT — PAIN DESCRIPTION - LOCATION: LOCATION: SACRUM;HIP

## 2019-01-04 ASSESSMENT — PAIN DESCRIPTION - PAIN TYPE: TYPE: ACUTE PAIN

## 2019-01-04 ASSESSMENT — PAIN SCALES - GENERAL: PAINLEVEL_OUTOF10: 10

## 2019-01-05 VITALS
DIASTOLIC BLOOD PRESSURE: 106 MMHG | SYSTOLIC BLOOD PRESSURE: 177 MMHG | HEART RATE: 81 BPM | RESPIRATION RATE: 20 BRPM | TEMPERATURE: 98.6 F | WEIGHT: 258 LBS | OXYGEN SATURATION: 93 % | HEIGHT: 62 IN | BODY MASS INDEX: 47.48 KG/M2

## 2019-01-05 LAB
EKG ATRIAL RATE: 86 BPM
EKG P AXIS: 53 DEGREES
EKG P-R INTERVAL: 172 MS
EKG Q-T INTERVAL: 468 MS
EKG QRS DURATION: 150 MS
EKG QTC CALCULATION (BAZETT): 560 MS
EKG R AXIS: 53 DEGREES
EKG T AXIS: 59 DEGREES
EKG VENTRICULAR RATE: 86 BPM
FILM ARRAY ADENOVIRUS: NORMAL
FILM ARRAY BORDETELLA PERTUSSIS: NORMAL
FILM ARRAY CHLAMYDOPHILIA PNEUMONIAE: NORMAL
FILM ARRAY CORONAVIRUS 229E: NORMAL
FILM ARRAY CORONAVIRUS HKU1: NORMAL
FILM ARRAY CORONAVIRUS NL63: NORMAL
FILM ARRAY CORONAVIRUS OC43: NORMAL
FILM ARRAY INFLUENZA A VIRUS 09H1: NORMAL
FILM ARRAY INFLUENZA A VIRUS H1: NORMAL
FILM ARRAY INFLUENZA A VIRUS H3: NORMAL
FILM ARRAY INFLUENZA A VIRUS: NORMAL
FILM ARRAY INFLUENZA B: NORMAL
FILM ARRAY METAPNEUMOVIRUS: NORMAL
FILM ARRAY MYCOPLASMA PNEUMONIAE: NORMAL
FILM ARRAY PARAINFLUENZA VIRUS 1: NORMAL
FILM ARRAY PARAINFLUENZA VIRUS 2: NORMAL
FILM ARRAY PARAINFLUENZA VIRUS 3: NORMAL
FILM ARRAY PARAINFLUENZA VIRUS 4: NORMAL
FILM ARRAY RESPIRATORY SYNCITIAL VIRUS: NORMAL
FILM ARRAY RHINOVIRUS/ENTEROVIRUS: NORMAL

## 2019-01-05 PROCEDURE — 96374 THER/PROPH/DIAG INJ IV PUSH: CPT

## 2019-01-05 PROCEDURE — 6360000002 HC RX W HCPCS: Performed by: EMERGENCY MEDICINE

## 2019-01-05 RX ORDER — FENTANYL CITRATE 50 UG/ML
100 INJECTION, SOLUTION INTRAMUSCULAR; INTRAVENOUS ONCE
Status: COMPLETED | OUTPATIENT
Start: 2019-01-05 | End: 2019-01-05

## 2019-01-05 RX ORDER — CYCLOBENZAPRINE HYDROCHLORIDE 7.5 MG/1
7.5 TABLET, FILM COATED ORAL 2 TIMES DAILY PRN
Qty: 20 TABLET | Refills: 0 | Status: SHIPPED | OUTPATIENT
Start: 2019-01-05 | End: 2019-01-09

## 2019-01-05 RX ORDER — TRAMADOL HYDROCHLORIDE 50 MG/1
50 TABLET ORAL EVERY 6 HOURS PRN
Qty: 12 TABLET | Refills: 0 | Status: SHIPPED | OUTPATIENT
Start: 2019-01-05 | End: 2019-01-08

## 2019-01-05 RX ADMIN — FENTANYL CITRATE 100 MCG: 50 INJECTION, SOLUTION INTRAMUSCULAR; INTRAVENOUS at 00:49

## 2019-01-05 ASSESSMENT — PAIN SCALES - GENERAL: PAINLEVEL_OUTOF10: 10

## 2019-01-07 LAB — URINE CULTURE, ROUTINE: NORMAL

## 2019-01-09 ENCOUNTER — HOSPITAL ENCOUNTER (EMERGENCY)
Age: 64
Discharge: HOME | End: 2019-01-09
Attending: EMERGENCY MEDICINE
Payer: MEDICARE

## 2019-01-09 ENCOUNTER — APPOINTMENT (OUTPATIENT)
Dept: GENERAL RADIOLOGY | Age: 64
End: 2019-01-09
Payer: MEDICARE

## 2019-01-09 ENCOUNTER — APPOINTMENT (OUTPATIENT)
Dept: CT IMAGING | Age: 64
End: 2019-01-09
Payer: MEDICARE

## 2019-01-09 ENCOUNTER — OFFICE VISIT (OUTPATIENT)
Dept: FAMILY MEDICINE CLINIC | Age: 64
End: 2019-01-09
Payer: MEDICARE

## 2019-01-09 VITALS
DIASTOLIC BLOOD PRESSURE: 84 MMHG | OXYGEN SATURATION: 95 % | HEIGHT: 62 IN | TEMPERATURE: 98.1 F | WEIGHT: 259 LBS | HEART RATE: 99 BPM | BODY MASS INDEX: 47.66 KG/M2 | SYSTOLIC BLOOD PRESSURE: 134 MMHG

## 2019-01-09 VITALS
SYSTOLIC BLOOD PRESSURE: 164 MMHG | DIASTOLIC BLOOD PRESSURE: 95 MMHG | WEIGHT: 259 LBS | RESPIRATION RATE: 10 BRPM | HEART RATE: 83 BPM | BODY MASS INDEX: 47.37 KG/M2 | TEMPERATURE: 97 F | OXYGEN SATURATION: 93 %

## 2019-01-09 DIAGNOSIS — R05.9 COUGH: ICD-10-CM

## 2019-01-09 DIAGNOSIS — H10.33 ACUTE BACTERIAL CONJUNCTIVITIS OF BOTH EYES: ICD-10-CM

## 2019-01-09 DIAGNOSIS — M54.2 NECK PAIN: Primary | ICD-10-CM

## 2019-01-09 DIAGNOSIS — Z79.4 TYPE 2 DIABETES MELLITUS WITH COMPLICATION, WITH LONG-TERM CURRENT USE OF INSULIN (HCC): Chronic | ICD-10-CM

## 2019-01-09 DIAGNOSIS — E66.01 MORBID OBESITY DUE TO EXCESS CALORIES (HCC): ICD-10-CM

## 2019-01-09 DIAGNOSIS — Z00.00 ROUTINE GENERAL MEDICAL EXAMINATION AT A HEALTH CARE FACILITY: Primary | ICD-10-CM

## 2019-01-09 DIAGNOSIS — E11.8 TYPE 2 DIABETES MELLITUS WITH COMPLICATION, WITH LONG-TERM CURRENT USE OF INSULIN (HCC): Chronic | ICD-10-CM

## 2019-01-09 DIAGNOSIS — R42 DISEQUILIBRIUM: ICD-10-CM

## 2019-01-09 DIAGNOSIS — N28.9 LESION OF LEFT NATIVE KIDNEY: ICD-10-CM

## 2019-01-09 DIAGNOSIS — R25.1 TREMOR: ICD-10-CM

## 2019-01-09 LAB
ACETAMINOPHEN LEVEL: <5 MCG/ML (ref 10–30)
ALBUMIN SERPL-MCNC: 3.5 G/DL (ref 3.5–5.2)
ALP BLD-CCNC: 72 U/L (ref 35–104)
ALT SERPL-CCNC: 15 U/L (ref 0–32)
AMPHETAMINE SCREEN, URINE: NOT DETECTED
ANION GAP SERPL CALCULATED.3IONS-SCNC: 11 MMOL/L (ref 7–16)
AST SERPL-CCNC: 11 U/L (ref 0–31)
BACTERIA: ABNORMAL /HPF
BARBITURATE SCREEN URINE: NOT DETECTED
BASOPHILS ABSOLUTE: 0.02 E9/L (ref 0–0.2)
BASOPHILS RELATIVE PERCENT: 0.3 % (ref 0–2)
BENZODIAZEPINE SCREEN, URINE: NOT DETECTED
BILIRUB SERPL-MCNC: 0.3 MG/DL (ref 0–1.2)
BILIRUBIN URINE: NEGATIVE
BLOOD, URINE: NEGATIVE
BUN BLDV-MCNC: 15 MG/DL (ref 8–23)
CALCIUM SERPL-MCNC: 9 MG/DL (ref 8.6–10.2)
CANNABINOID SCREEN URINE: POSITIVE
CHLORIDE BLD-SCNC: 103 MMOL/L (ref 98–107)
CLARITY: ABNORMAL
CO2: 27 MMOL/L (ref 22–29)
COCAINE METABOLITE SCREEN URINE: NOT DETECTED
COLOR: YELLOW
CREAT SERPL-MCNC: 1 MG/DL (ref 0.5–1)
EKG ATRIAL RATE: 73 BPM
EKG P AXIS: 58 DEGREES
EKG P-R INTERVAL: 186 MS
EKG Q-T INTERVAL: 484 MS
EKG QRS DURATION: 132 MS
EKG QTC CALCULATION (BAZETT): 533 MS
EKG R AXIS: 64 DEGREES
EKG T AXIS: 79 DEGREES
EKG VENTRICULAR RATE: 73 BPM
EOSINOPHILS ABSOLUTE: 0.12 E9/L (ref 0.05–0.5)
EOSINOPHILS RELATIVE PERCENT: 1.9 % (ref 0–6)
ETHANOL: <10 MG/DL (ref 0–0.08)
GFR AFRICAN AMERICAN: >60
GFR NON-AFRICAN AMERICAN: >60 ML/MIN/1.73
GLUCOSE BLD-MCNC: 138 MG/DL (ref 74–99)
GLUCOSE BLD-MCNC: 185 MG/DL
GLUCOSE URINE: NEGATIVE MG/DL
HCT VFR BLD CALC: 38.5 % (ref 34–48)
HEMOGLOBIN: 12.2 G/DL (ref 11.5–15.5)
IMMATURE GRANULOCYTES #: 0.01 E9/L
IMMATURE GRANULOCYTES %: 0.2 % (ref 0–5)
KETONES, URINE: NEGATIVE MG/DL
LACTIC ACID: 0.7 MMOL/L (ref 0.5–2.2)
LEUKOCYTE ESTERASE, URINE: NEGATIVE
LYMPHOCYTES ABSOLUTE: 1.22 E9/L (ref 1.5–4)
LYMPHOCYTES RELATIVE PERCENT: 19.1 % (ref 20–42)
MCH RBC QN AUTO: 31 PG (ref 26–35)
MCHC RBC AUTO-ENTMCNC: 31.7 % (ref 32–34.5)
MCV RBC AUTO: 97.7 FL (ref 80–99.9)
METHADONE SCREEN, URINE: NOT DETECTED
MONOCYTES ABSOLUTE: 0.36 E9/L (ref 0.1–0.95)
MONOCYTES RELATIVE PERCENT: 5.6 % (ref 2–12)
NEUTROPHILS ABSOLUTE: 4.65 E9/L (ref 1.8–7.3)
NEUTROPHILS RELATIVE PERCENT: 72.9 % (ref 43–80)
NITRITE, URINE: NEGATIVE
OPIATE SCREEN URINE: NOT DETECTED
PDW BLD-RTO: 13.2 FL (ref 11.5–15)
PH UA: 6 (ref 5–9)
PHENCYCLIDINE SCREEN URINE: NOT DETECTED
PLATELET # BLD: 252 E9/L (ref 130–450)
PMV BLD AUTO: 10.6 FL (ref 7–12)
POTASSIUM SERPL-SCNC: 3.6 MMOL/L (ref 3.5–5)
PROPOXYPHENE SCREEN: NOT DETECTED
PROTEIN UA: NEGATIVE MG/DL
RBC # BLD: 3.94 E12/L (ref 3.5–5.5)
RBC UA: ABNORMAL /HPF (ref 0–2)
SALICYLATE, SERUM: <0.3 MG/DL (ref 0–30)
SODIUM BLD-SCNC: 141 MMOL/L (ref 132–146)
SPECIFIC GRAVITY UA: 1.01 (ref 1–1.03)
TOTAL PROTEIN: 6.9 G/DL (ref 6.4–8.3)
TRICYCLIC ANTIDEPRESSANTS SCREEN SERUM: NEGATIVE NG/ML
TROPONIN: <0.01 NG/ML (ref 0–0.03)
UROBILINOGEN, URINE: 0.2 E.U./DL
WBC # BLD: 6.4 E9/L (ref 4.5–11.5)
WBC UA: ABNORMAL /HPF (ref 0–5)

## 2019-01-09 PROCEDURE — 74176 CT ABD & PELVIS W/O CONTRAST: CPT

## 2019-01-09 PROCEDURE — G8417 CALC BMI ABV UP PARAM F/U: HCPCS | Performed by: FAMILY MEDICINE

## 2019-01-09 PROCEDURE — 4004F PT TOBACCO SCREEN RCVD TLK: CPT | Performed by: FAMILY MEDICINE

## 2019-01-09 PROCEDURE — 99283 EMERGENCY DEPT VISIT LOW MDM: CPT

## 2019-01-09 PROCEDURE — G0480 DRUG TEST DEF 1-7 CLASSES: HCPCS

## 2019-01-09 PROCEDURE — G8427 DOCREV CUR MEDS BY ELIG CLIN: HCPCS | Performed by: FAMILY MEDICINE

## 2019-01-09 PROCEDURE — 80053 COMPREHEN METABOLIC PANEL: CPT

## 2019-01-09 PROCEDURE — 84484 ASSAY OF TROPONIN QUANT: CPT

## 2019-01-09 PROCEDURE — 6370000000 HC RX 637 (ALT 250 FOR IP): Performed by: NURSE PRACTITIONER

## 2019-01-09 PROCEDURE — 82962 GLUCOSE BLOOD TEST: CPT | Performed by: FAMILY MEDICINE

## 2019-01-09 PROCEDURE — 36415 COLL VENOUS BLD VENIPUNCTURE: CPT

## 2019-01-09 PROCEDURE — 80307 DRUG TEST PRSMV CHEM ANLYZR: CPT

## 2019-01-09 PROCEDURE — 93005 ELECTROCARDIOGRAM TRACING: CPT | Performed by: NURSE PRACTITIONER

## 2019-01-09 PROCEDURE — 70450 CT HEAD/BRAIN W/O DYE: CPT

## 2019-01-09 PROCEDURE — 71045 X-RAY EXAM CHEST 1 VIEW: CPT

## 2019-01-09 PROCEDURE — 72125 CT NECK SPINE W/O DYE: CPT

## 2019-01-09 PROCEDURE — 85025 COMPLETE CBC W/AUTO DIFF WBC: CPT

## 2019-01-09 PROCEDURE — G0439 PPPS, SUBSEQ VISIT: HCPCS | Performed by: FAMILY MEDICINE

## 2019-01-09 PROCEDURE — 3017F COLORECTAL CA SCREEN DOC REV: CPT | Performed by: FAMILY MEDICINE

## 2019-01-09 PROCEDURE — 99215 OFFICE O/P EST HI 40 MIN: CPT | Performed by: FAMILY MEDICINE

## 2019-01-09 PROCEDURE — 83605 ASSAY OF LACTIC ACID: CPT

## 2019-01-09 PROCEDURE — G0444 DEPRESSION SCREEN ANNUAL: HCPCS | Performed by: FAMILY MEDICINE

## 2019-01-09 PROCEDURE — G8598 ASA/ANTIPLAT THER USED: HCPCS | Performed by: FAMILY MEDICINE

## 2019-01-09 PROCEDURE — 3046F HEMOGLOBIN A1C LEVEL >9.0%: CPT | Performed by: FAMILY MEDICINE

## 2019-01-09 PROCEDURE — 81001 URINALYSIS AUTO W/SCOPE: CPT

## 2019-01-09 PROCEDURE — G8484 FLU IMMUNIZE NO ADMIN: HCPCS | Performed by: FAMILY MEDICINE

## 2019-01-09 PROCEDURE — 2022F DILAT RTA XM EVC RTNOPTHY: CPT | Performed by: FAMILY MEDICINE

## 2019-01-09 RX ORDER — TOBRAMYCIN 3 MG/ML
1 SOLUTION/ DROPS OPHTHALMIC EVERY 4 HOURS
Qty: 1 BOTTLE | Refills: 0 | Status: SHIPPED | OUTPATIENT
Start: 2019-01-09 | End: 2019-01-19

## 2019-01-09 RX ORDER — OXYBUTYNIN CHLORIDE 5 MG/1
TABLET, EXTENDED RELEASE ORAL
Refills: 3 | COMMUNITY
Start: 2018-12-24 | End: 2019-01-09

## 2019-01-09 RX ORDER — HYDROCODONE BITARTRATE AND ACETAMINOPHEN 5; 325 MG/1; MG/1
1 TABLET ORAL EVERY 8 HOURS PRN
Qty: 12 TABLET | Refills: 0 | Status: SHIPPED | OUTPATIENT
Start: 2019-01-09 | End: 2019-01-12

## 2019-01-09 RX ORDER — CETIRIZINE HYDROCHLORIDE 10 MG/1
10 TABLET ORAL DAILY
Qty: 30 TABLET | Refills: 3 | Status: SHIPPED | OUTPATIENT
Start: 2019-01-09 | End: 2019-06-26 | Stop reason: SDUPTHER

## 2019-01-09 RX ORDER — GUAIFENESIN 600 MG/1
1200 TABLET, EXTENDED RELEASE ORAL 2 TIMES DAILY PRN
COMMUNITY
Start: 2019-01-09 | End: 2019-01-10 | Stop reason: SDUPTHER

## 2019-01-09 RX ORDER — ASPIRIN 81 MG/1
TABLET, CHEWABLE ORAL
Qty: 30 TABLET | Refills: 0 | Status: SHIPPED | OUTPATIENT
Start: 2019-01-09 | End: 2019-01-31 | Stop reason: SDUPTHER

## 2019-01-09 RX ORDER — BACLOFEN 10 MG/1
TABLET ORAL
Qty: 30 TABLET | Refills: 0 | Status: SHIPPED | OUTPATIENT
Start: 2019-01-09 | End: 2019-02-11 | Stop reason: SDUPTHER

## 2019-01-09 RX ORDER — HYDROCODONE BITARTRATE AND ACETAMINOPHEN 5; 325 MG/1; MG/1
1 TABLET ORAL ONCE
Status: COMPLETED | OUTPATIENT
Start: 2019-01-09 | End: 2019-01-09

## 2019-01-09 RX ORDER — PREDNISONE 1 MG/1
50 TABLET ORAL EVERY 6 HOURS
Qty: 150 TABLET | Refills: 0 | Status: SHIPPED | OUTPATIENT
Start: 2019-01-09 | End: 2019-01-11 | Stop reason: SDUPTHER

## 2019-01-09 RX ORDER — TRAZODONE HYDROCHLORIDE 100 MG/1
TABLET ORAL
Refills: 3 | COMMUNITY
Start: 2019-01-02 | End: 2019-06-26 | Stop reason: SDUPTHER

## 2019-01-09 RX ORDER — DIPHENHYDRAMINE HCL 25 MG
50 TABLET ORAL ONCE
Qty: 2 TABLET | Refills: 0 | Status: SHIPPED | OUTPATIENT
Start: 2019-01-09 | End: 2019-01-09

## 2019-01-09 RX ADMIN — HYDROCODONE BITARTRATE AND ACETAMINOPHEN 1 TABLET: 5; 325 TABLET ORAL at 20:01

## 2019-01-09 ASSESSMENT — PATIENT HEALTH QUESTIONNAIRE - PHQ9: SUM OF ALL RESPONSES TO PHQ QUESTIONS 1-9: 13

## 2019-01-09 ASSESSMENT — ANXIETY QUESTIONNAIRES: GAD7 TOTAL SCORE: 3

## 2019-01-09 ASSESSMENT — LIFESTYLE VARIABLES: HOW OFTEN DO YOU HAVE A DRINK CONTAINING ALCOHOL: 0

## 2019-01-09 ASSESSMENT — PAIN SCALES - GENERAL: PAINLEVEL_OUTOF10: 10

## 2019-01-10 ENCOUNTER — TELEPHONE (OUTPATIENT)
Dept: FAMILY MEDICINE CLINIC | Age: 64
End: 2019-01-10

## 2019-01-10 DIAGNOSIS — N28.9 LESION OF LEFT NATIVE KIDNEY: ICD-10-CM

## 2019-01-10 DIAGNOSIS — R25.1 EPISODE OF SHAKING: Primary | ICD-10-CM

## 2019-01-10 LAB
BLOOD CULTURE, ROUTINE: NORMAL
CULTURE, BLOOD 2: NORMAL

## 2019-01-10 RX ORDER — DOXYCYCLINE HYCLATE 100 MG
100 TABLET ORAL 2 TIMES DAILY WITH MEALS
Qty: 20 TABLET | Refills: 0 | Status: SHIPPED | OUTPATIENT
Start: 2019-01-10 | End: 2019-01-20

## 2019-01-10 ASSESSMENT — ENCOUNTER SYMPTOMS
VOMITING: 0
EYE PAIN: 0
NAUSEA: 0
RHINORRHEA: 0
SINUS PRESSURE: 0
CONSTIPATION: 0
SHORTNESS OF BREATH: 0
ABDOMINAL PAIN: 0
SINUS PAIN: 0
DIARRHEA: 0
COUGH: 1
TROUBLE SWALLOWING: 0
SORE THROAT: 0
BACK PAIN: 0

## 2019-01-11 RX ORDER — GUAIFENESIN 600 MG/1
1200 TABLET, EXTENDED RELEASE ORAL 2 TIMES DAILY PRN
Qty: 30 TABLET | Refills: 2 | Status: SHIPPED | OUTPATIENT
Start: 2019-01-11 | End: 2019-06-26

## 2019-01-11 RX ORDER — PREDNISONE 50 MG/1
50 TABLET ORAL EVERY 6 HOURS
Qty: 3 TABLET | Refills: 0 | Status: SHIPPED | OUTPATIENT
Start: 2019-01-11 | End: 2019-01-12

## 2019-01-14 LAB — CANNABINOIDS CONF, URINE: 450 NG/ML

## 2019-01-17 RX ORDER — PEN NEEDLE, DIABETIC 31 GX5/16"
NEEDLE, DISPOSABLE MISCELLANEOUS
Qty: 100 EACH | Refills: 3 | Status: SHIPPED | OUTPATIENT
Start: 2019-01-17 | End: 2019-05-14 | Stop reason: SDUPTHER

## 2019-01-22 ENCOUNTER — TELEPHONE (OUTPATIENT)
Dept: ADMINISTRATIVE | Age: 64
End: 2019-01-22

## 2019-01-31 RX ORDER — QUETIAPINE FUMARATE 25 MG/1
25 TABLET, FILM COATED ORAL EVERY EVENING
Qty: 90 TABLET | Refills: 0 | Status: SHIPPED | OUTPATIENT
Start: 2019-01-31 | End: 2019-01-31 | Stop reason: SDUPTHER

## 2019-01-31 RX ORDER — ASPIRIN 81 MG/1
TABLET, CHEWABLE ORAL
Qty: 30 TABLET | Refills: 2 | Status: SHIPPED | OUTPATIENT
Start: 2019-01-31 | End: 2019-05-14 | Stop reason: SDUPTHER

## 2019-02-01 RX ORDER — QUETIAPINE FUMARATE 25 MG/1
TABLET, FILM COATED ORAL
Qty: 90 TABLET | Refills: 0 | Status: SHIPPED | OUTPATIENT
Start: 2019-02-01 | End: 2019-06-26

## 2019-02-05 ENCOUNTER — OFFICE VISIT (OUTPATIENT)
Dept: ORTHOPEDIC SURGERY | Age: 64
End: 2019-02-05
Payer: MEDICARE

## 2019-02-05 DIAGNOSIS — Z96.659 ASEPTIC LOOSENING OF PROSTHETIC KNEE, INITIAL ENCOUNTER (HCC): ICD-10-CM

## 2019-02-05 DIAGNOSIS — T84.038A ASEPTIC LOOSENING OF PROSTHETIC KNEE, INITIAL ENCOUNTER (HCC): ICD-10-CM

## 2019-02-05 DIAGNOSIS — M65.341 TRIGGER FINGER, RIGHT RING FINGER: Primary | ICD-10-CM

## 2019-02-05 DIAGNOSIS — M17.11 PRIMARY OSTEOARTHRITIS OF RIGHT KNEE: ICD-10-CM

## 2019-02-05 DIAGNOSIS — G56.01 RIGHT CARPAL TUNNEL SYNDROME: ICD-10-CM

## 2019-02-05 DIAGNOSIS — G56.02 LEFT CARPAL TUNNEL SYNDROME: ICD-10-CM

## 2019-02-05 PROCEDURE — G8484 FLU IMMUNIZE NO ADMIN: HCPCS | Performed by: ORTHOPAEDIC SURGERY

## 2019-02-05 PROCEDURE — 20610 DRAIN/INJ JOINT/BURSA W/O US: CPT | Performed by: ORTHOPAEDIC SURGERY

## 2019-02-05 PROCEDURE — G8417 CALC BMI ABV UP PARAM F/U: HCPCS | Performed by: ORTHOPAEDIC SURGERY

## 2019-02-05 PROCEDURE — 4004F PT TOBACCO SCREEN RCVD TLK: CPT | Performed by: ORTHOPAEDIC SURGERY

## 2019-02-05 PROCEDURE — 99214 OFFICE O/P EST MOD 30 MIN: CPT | Performed by: ORTHOPAEDIC SURGERY

## 2019-02-05 PROCEDURE — 3017F COLORECTAL CA SCREEN DOC REV: CPT | Performed by: ORTHOPAEDIC SURGERY

## 2019-02-05 PROCEDURE — G8427 DOCREV CUR MEDS BY ELIG CLIN: HCPCS | Performed by: ORTHOPAEDIC SURGERY

## 2019-02-05 PROCEDURE — G8598 ASA/ANTIPLAT THER USED: HCPCS | Performed by: ORTHOPAEDIC SURGERY

## 2019-02-05 RX ORDER — TRIAMCINOLONE ACETONIDE 40 MG/ML
40 INJECTION, SUSPENSION INTRA-ARTICULAR; INTRAMUSCULAR ONCE
Status: COMPLETED | OUTPATIENT
Start: 2019-02-05 | End: 2019-02-05

## 2019-02-05 RX ADMIN — TRIAMCINOLONE ACETONIDE 40 MG: 40 INJECTION, SUSPENSION INTRA-ARTICULAR; INTRAMUSCULAR at 16:34

## 2019-02-06 RX ORDER — INSULIN LISPRO 100 [IU]/ML
INJECTION, SOLUTION INTRAVENOUS; SUBCUTANEOUS
Qty: 15 ML | Refills: 11 | Status: SHIPPED | OUTPATIENT
Start: 2019-02-06 | End: 2019-03-27 | Stop reason: SDUPTHER

## 2019-02-07 ENCOUNTER — HOSPITAL ENCOUNTER (OUTPATIENT)
Dept: NEUROLOGY | Age: 64
Discharge: HOME | End: 2019-02-07
Payer: MEDICARE

## 2019-02-07 PROCEDURE — 95812 EEG 41-60 MINUTES: CPT | Performed by: PSYCHIATRY & NEUROLOGY

## 2019-02-07 PROCEDURE — 95819 EEG AWAKE AND ASLEEP: CPT

## 2019-02-11 ENCOUNTER — OFFICE VISIT (OUTPATIENT)
Dept: CARDIOLOGY CLINIC | Age: 64
End: 2019-02-11
Payer: MEDICARE

## 2019-02-11 VITALS
DIASTOLIC BLOOD PRESSURE: 80 MMHG | SYSTOLIC BLOOD PRESSURE: 130 MMHG | WEIGHT: 257.6 LBS | RESPIRATION RATE: 16 BRPM | BODY MASS INDEX: 47.41 KG/M2 | HEIGHT: 62 IN | HEART RATE: 86 BPM

## 2019-02-11 DIAGNOSIS — I25.10 CORONARY ARTERY DISEASE INVOLVING NATIVE CORONARY ARTERY OF NATIVE HEART WITHOUT ANGINA PECTORIS: Primary | Chronic | ICD-10-CM

## 2019-02-11 PROCEDURE — G8484 FLU IMMUNIZE NO ADMIN: HCPCS | Performed by: INTERNAL MEDICINE

## 2019-02-11 PROCEDURE — 3017F COLORECTAL CA SCREEN DOC REV: CPT | Performed by: INTERNAL MEDICINE

## 2019-02-11 PROCEDURE — 4004F PT TOBACCO SCREEN RCVD TLK: CPT | Performed by: INTERNAL MEDICINE

## 2019-02-11 PROCEDURE — 93000 ELECTROCARDIOGRAM COMPLETE: CPT | Performed by: INTERNAL MEDICINE

## 2019-02-11 PROCEDURE — G8427 DOCREV CUR MEDS BY ELIG CLIN: HCPCS | Performed by: INTERNAL MEDICINE

## 2019-02-11 PROCEDURE — G8598 ASA/ANTIPLAT THER USED: HCPCS | Performed by: INTERNAL MEDICINE

## 2019-02-11 PROCEDURE — G8417 CALC BMI ABV UP PARAM F/U: HCPCS | Performed by: INTERNAL MEDICINE

## 2019-02-11 PROCEDURE — 99214 OFFICE O/P EST MOD 30 MIN: CPT | Performed by: INTERNAL MEDICINE

## 2019-02-11 RX ORDER — HYDRALAZINE HYDROCHLORIDE 100 MG/1
100 TABLET, FILM COATED ORAL 3 TIMES DAILY
Qty: 270 TABLET | Refills: 3 | Status: SHIPPED
Start: 2019-02-11 | End: 2020-03-03 | Stop reason: DRUGHIGH

## 2019-02-11 RX ORDER — METOPROLOL SUCCINATE 25 MG/1
25 TABLET, EXTENDED RELEASE ORAL 2 TIMES DAILY
Qty: 60 TABLET | Refills: 5 | Status: SHIPPED | OUTPATIENT
Start: 2019-02-11 | End: 2019-06-26 | Stop reason: SDUPTHER

## 2019-02-11 RX ORDER — SIMVASTATIN 20 MG
20 TABLET ORAL NIGHTLY
Qty: 90 TABLET | Refills: 3 | Status: SHIPPED | OUTPATIENT
Start: 2019-02-11 | End: 2019-03-27

## 2019-02-11 RX ORDER — ISOSORBIDE MONONITRATE 60 MG/1
60 TABLET, EXTENDED RELEASE ORAL DAILY
Qty: 90 TABLET | Refills: 3 | Status: SHIPPED | OUTPATIENT
Start: 2019-02-11 | End: 2019-06-26 | Stop reason: SDUPTHER

## 2019-02-12 ENCOUNTER — TELEPHONE (OUTPATIENT)
Dept: CARDIOLOGY CLINIC | Age: 64
End: 2019-02-12

## 2019-02-12 RX ORDER — BACLOFEN 10 MG/1
TABLET ORAL
Qty: 30 TABLET | Refills: 0 | Status: SHIPPED | OUTPATIENT
Start: 2019-02-12 | End: 2019-04-06 | Stop reason: SDUPTHER

## 2019-02-20 ENCOUNTER — TELEPHONE (OUTPATIENT)
Dept: ADMINISTRATIVE | Age: 64
End: 2019-02-20

## 2019-02-22 ENCOUNTER — TELEPHONE (OUTPATIENT)
Dept: FAMILY MEDICINE CLINIC | Age: 64
End: 2019-02-22

## 2019-02-25 ENCOUNTER — OFFICE VISIT (OUTPATIENT)
Dept: VASCULAR SURGERY | Age: 64
End: 2019-02-25

## 2019-02-25 ENCOUNTER — TELEPHONE (OUTPATIENT)
Dept: VASCULAR SURGERY | Age: 64
End: 2019-02-25

## 2019-02-25 DIAGNOSIS — I70.222 ATHEROSCLEROSIS OF NATIVE ARTERIES OF EXTREMITIES WITH REST PAIN, LEFT LEG (HCC): Primary | Chronic | ICD-10-CM

## 2019-02-25 PROCEDURE — 99024 POSTOP FOLLOW-UP VISIT: CPT | Performed by: SURGERY

## 2019-02-27 ENCOUNTER — TELEPHONE (OUTPATIENT)
Dept: FAMILY MEDICINE CLINIC | Age: 64
End: 2019-02-27

## 2019-02-28 ENCOUNTER — TELEPHONE (OUTPATIENT)
Dept: FAMILY MEDICINE CLINIC | Age: 64
End: 2019-02-28

## 2019-03-04 ENCOUNTER — APPOINTMENT (OUTPATIENT)
Dept: GENERAL RADIOLOGY | Age: 64
End: 2019-03-04
Payer: MEDICARE

## 2019-03-04 ENCOUNTER — HOSPITAL ENCOUNTER (EMERGENCY)
Age: 64
Discharge: HOME | End: 2019-03-04
Payer: MEDICARE

## 2019-03-04 VITALS
TEMPERATURE: 98.6 F | BODY MASS INDEX: 48.52 KG/M2 | RESPIRATION RATE: 18 BRPM | HEIGHT: 61 IN | OXYGEN SATURATION: 95 % | DIASTOLIC BLOOD PRESSURE: 71 MMHG | WEIGHT: 257 LBS | HEART RATE: 98 BPM | SYSTOLIC BLOOD PRESSURE: 160 MMHG

## 2019-03-04 DIAGNOSIS — R10.9 NONSPECIFIC ABDOMINAL PAIN: Primary | ICD-10-CM

## 2019-03-04 DIAGNOSIS — J44.1 COPD WITH EXACERBATION (HCC): ICD-10-CM

## 2019-03-04 LAB
ALBUMIN SERPL-MCNC: 3.5 G/DL (ref 3.5–5.2)
ALP BLD-CCNC: 76 U/L (ref 35–104)
ALT SERPL-CCNC: 6 U/L (ref 0–32)
ANION GAP SERPL CALCULATED.3IONS-SCNC: 12 MMOL/L (ref 7–16)
AST SERPL-CCNC: 14 U/L (ref 0–31)
BASOPHILS ABSOLUTE: 0.01 E9/L (ref 0–0.2)
BASOPHILS RELATIVE PERCENT: 0.1 % (ref 0–2)
BILIRUB SERPL-MCNC: 0.3 MG/DL (ref 0–1.2)
BUN BLDV-MCNC: 16 MG/DL (ref 8–23)
CALCIUM SERPL-MCNC: 8.8 MG/DL (ref 8.6–10.2)
CHLORIDE BLD-SCNC: 106 MMOL/L (ref 98–107)
CO2: 24 MMOL/L (ref 22–29)
CREAT SERPL-MCNC: 0.9 MG/DL (ref 0.5–1)
EOSINOPHILS ABSOLUTE: 0.17 E9/L (ref 0.05–0.5)
EOSINOPHILS RELATIVE PERCENT: 2.4 % (ref 0–6)
GFR AFRICAN AMERICAN: >60
GFR NON-AFRICAN AMERICAN: >60 ML/MIN/1.73
GLUCOSE BLD-MCNC: 103 MG/DL (ref 74–99)
HCT VFR BLD CALC: 42.7 % (ref 34–48)
HEMOGLOBIN: 12.7 G/DL (ref 11.5–15.5)
IMMATURE GRANULOCYTES #: 0.03 E9/L
IMMATURE GRANULOCYTES %: 0.4 % (ref 0–5)
INFLUENZA A BY PCR: NOT DETECTED
INFLUENZA B BY PCR: NOT DETECTED
LIPASE: 24 U/L (ref 13–60)
LYMPHOCYTES ABSOLUTE: 1.06 E9/L (ref 1.5–4)
LYMPHOCYTES RELATIVE PERCENT: 14.7 % (ref 20–42)
MCH RBC QN AUTO: 30.2 PG (ref 26–35)
MCHC RBC AUTO-ENTMCNC: 29.7 % (ref 32–34.5)
MCV RBC AUTO: 101.4 FL (ref 80–99.9)
MONOCYTES ABSOLUTE: 0.35 E9/L (ref 0.1–0.95)
MONOCYTES RELATIVE PERCENT: 4.8 % (ref 2–12)
NEUTROPHILS ABSOLUTE: 5.6 E9/L (ref 1.8–7.3)
NEUTROPHILS RELATIVE PERCENT: 77.6 % (ref 43–80)
PDW BLD-RTO: 13.3 FL (ref 11.5–15)
PLATELET # BLD: 225 E9/L (ref 130–450)
PMV BLD AUTO: 10.6 FL (ref 7–12)
POTASSIUM REFLEX MAGNESIUM: 4.7 MMOL/L (ref 3.5–5)
RBC # BLD: 4.21 E12/L (ref 3.5–5.5)
SODIUM BLD-SCNC: 142 MMOL/L (ref 132–146)
TOTAL PROTEIN: 6.7 G/DL (ref 6.4–8.3)
WBC # BLD: 7.2 E9/L (ref 4.5–11.5)

## 2019-03-04 PROCEDURE — 80053 COMPREHEN METABOLIC PANEL: CPT

## 2019-03-04 PROCEDURE — 87502 INFLUENZA DNA AMP PROBE: CPT

## 2019-03-04 PROCEDURE — 83690 ASSAY OF LIPASE: CPT

## 2019-03-04 PROCEDURE — 74176 CT ABD & PELVIS W/O CONTRAST: CPT

## 2019-03-04 PROCEDURE — 99283 EMERGENCY DEPT VISIT LOW MDM: CPT

## 2019-03-04 PROCEDURE — 85025 COMPLETE CBC W/AUTO DIFF WBC: CPT

## 2019-03-04 PROCEDURE — 71046 X-RAY EXAM CHEST 2 VIEWS: CPT

## 2019-03-04 PROCEDURE — 36415 COLL VENOUS BLD VENIPUNCTURE: CPT

## 2019-03-04 PROCEDURE — 6370000000 HC RX 637 (ALT 250 FOR IP)

## 2019-03-04 RX ORDER — IPRATROPIUM BROMIDE AND ALBUTEROL SULFATE 2.5; .5 MG/3ML; MG/3ML
SOLUTION RESPIRATORY (INHALATION)
Status: DISCONTINUED
Start: 2019-03-04 | End: 2019-03-04 | Stop reason: HOSPADM

## 2019-03-04 RX ORDER — IPRATROPIUM BROMIDE AND ALBUTEROL SULFATE 2.5; .5 MG/3ML; MG/3ML
1 SOLUTION RESPIRATORY (INHALATION)
Status: DISCONTINUED | OUTPATIENT
Start: 2019-03-04 | End: 2019-03-04 | Stop reason: HOSPADM

## 2019-03-04 RX ORDER — IPRATROPIUM BROMIDE AND ALBUTEROL SULFATE 2.5; .5 MG/3ML; MG/3ML
SOLUTION RESPIRATORY (INHALATION)
Status: COMPLETED
Start: 2019-03-04 | End: 2019-03-04

## 2019-03-04 RX ADMIN — IPRATROPIUM BROMIDE AND ALBUTEROL SULFATE 3 ML: .5; 3 SOLUTION RESPIRATORY (INHALATION) at 14:04

## 2019-03-04 RX ADMIN — IPRATROPIUM BROMIDE AND ALBUTEROL SULFATE 3 ML: 2.5; .5 SOLUTION RESPIRATORY (INHALATION) at 14:04

## 2019-03-04 ASSESSMENT — PAIN DESCRIPTION - DESCRIPTORS
DESCRIPTORS: ACHING
DESCRIPTORS: SHARP

## 2019-03-04 ASSESSMENT — PAIN DESCRIPTION - LOCATION
LOCATION: CHEST;OTHER (COMMENT)
LOCATION: CHEST

## 2019-03-04 ASSESSMENT — PAIN DESCRIPTION - PAIN TYPE
TYPE: ACUTE PAIN
TYPE: ACUTE PAIN

## 2019-03-04 ASSESSMENT — PAIN SCALES - GENERAL
PAINLEVEL_OUTOF10: 6
PAINLEVEL_OUTOF10: 9

## 2019-03-04 NOTE — ED PROVIDER NOTES
ED Attending  CC: No      HPI:  3/4/19, Time: 1:01 PM         Cole Hester is a 58 y.o. female presenting to the ED from home and complains of worsening cough and congestion over the past week as well as  right sided abd pain over the past week. The complaint has been persistent, mild in severity, and worsened by walking. She continues to smoke. ROS:   Pertinent positives and negatives are stated within HPI, all other systems reviewed and are negative.  --------------------------------------------- PAST HISTORY ---------------------------------------------  Past Medical History:  has a past medical history of Asthma, Atherosclerosis of native artery of left leg with rest pain ([address] C. difficile diarrhea, CAD (coronary artery disease), Cellulitis and abscess of trunk, Chronic back pain, Chronic kidney disease, Chronic systolic congestive heart failure ([address] Chronic venous insufficiency, COPD (chronic obstructive pulmonary disease) ([address] Depression, Diabetes mellitus ([address] Diabetic neuropathy ([address] Diverticulosis, Fatty liver, Glaucoma, open angle, Hepatic encephalopathy ([address] Hiatal hernia, History of blood transfusion, Hyperlipidemia, Hyperplastic colon polyp, Hypertension, Incontinence, Liver mass, Morbid obesity ([address] Movement disorder, Myocardial infarction ([address] Osteoarthritis, generalized, Pain in both lower legs, Peripheral vascular disease ([address] Pneumonia, Pulmonary edema, PVD (peripheral vascular disease) with claudication ([address] Sleep apnea, Tobacco abuse, Tobacco abuse, Tubular adenoma polyp of rectum, Urinary tract infection due to ESBL Klebsiella, and Ventral hernia. Past Surgical History:  has a past surgical history that includes knee surgery; Upper gastrointestinal endoscopy (12/20/12); Coronary artery bypass graft; layer wound closure (Left, [id number]); Echo Complete (10/9/2013); polysomnography (1/2016); liver biopsy (1/8/2016); bronchial brush biopsy (1/25/2013);  Breast surgery (2000); Cholecystectomy (YRS AGO); Cardiac surgery (12/2011); Cardiac catheterization (04/20/2015); Hysterectomy; joint replacement (2011); Upper gastrointestinal endoscopy (12/23/2016); Colonoscopy (11/15/2013); Colonoscopy (12/23/2016); Upper gastrointestinal endoscopy (02/08/2017); Endoscopy, colon, diagnostic (04/18/2017); Gallbladder surgery; and angioplasty (11/13/2018). Social History:  reports that she has been smoking cigarettes. She started smoking about 44 years ago. She has a 10.00 pack-year smoking history. She has never used smokeless tobacco. She reports that she has current or past drug history. Drug: Marijuana. She reports that she does not drink alcohol. Family History: family history includes Asthma in her father; Cancer in her mother. The patients home medications have been reviewed. Allergies: Latex; Bee venom; Dilaudid [hydromorphone hcl]; Dye [iodides]; Percocet [oxycodone-acetaminophen]; Keflex [cephalexin]; Lasix [furosemide]; Levaquin [levofloxacin in d5w]; Lipitor; Lyrica [pregabalin]; Morphine; Naproxen; Nefazodone; Norvasc [amlodipine]; Oxycodone-acetaminophen; Shellfish-derived products; and Trazodone and nefazodone    ---------------------------------------------------PHYSICAL EXAM--------------------------------------    Constitutional/General: Alert and oriented x3, well appearing, non toxic in NAD  Head: Normocephalic and atraumatic  Eyes: PERRL, EOMI  Mouth: Oropharynx clear, handling secretions, no trismus  Neck: Supple, full ROM, non tender to palpation in the midline, no stridor, no crepitus, no meningeal signs  Pulmonary: Lungs clear to auscultation bilaterally but diminished, no wheezes, rales, or rhonchi. Not in respiratory distress  Cardiovascular:  Regular rate. Regular rhythm. No murmurs, gallops, or rubs. 2+ distal pulses  Chest: no chest wall tenderness  Abdomen: Soft. Generalized tenderness to palpation. Non distended. +BS.   No rebound, guarding, or rigidity. No pulsatile masses appreciated. Musculoskeletal: Moves all extremities x 4. Warm and well perfused, no clubbing, cyanosis, or edema. Capillary refill <3 seconds  Skin: warm and dry. No rashes. Neurologic: GCS 15, CN 2-12 grossly intact, no focal deficits, symmetric strength 5/5 in the upper and lower extremities bilaterally  Psych: Normal Affect    -------------------------------------------------- RESULTS -------------------------------------------------  I have personally reviewed all laboratory and imaging results for this patient. Results are listed below.      LABS:  Results for orders placed or performed during the hospital encounter of 03/04/19   RAPID INFLUENZA A/B ANTIGENS   Result Value Ref Range    Influenza A by PCR Not Detected Not Detected    Influenza B by PCR Not Detected Not Detected   CBC Auto Differential   Result Value Ref Range    WBC 7.2 4.5 - 11.5 E9/L    RBC 4.21 3.50 - 5.50 E12/L    Hemoglobin 12.7 11.5 - 15.5 g/dL    Hematocrit 42.7 34.0 - 48.0 %    .4 (H) 80.0 - 99.9 fL    MCH 30.2 26.0 - 35.0 pg    MCHC 29.7 (L) 32.0 - 34.5 %    RDW 13.3 11.5 - 15.0 fL    Platelets 759 565 - 215 E9/L    MPV 10.6 7.0 - 12.0 fL    Neutrophils % 77.6 43.0 - 80.0 %    Immature Granulocytes % 0.4 0.0 - 5.0 %    Lymphocytes % 14.7 (L) 20.0 - 42.0 %    Monocytes % 4.8 2.0 - 12.0 %    Eosinophils % 2.4 0.0 - 6.0 %    Basophils % 0.1 0.0 - 2.0 %    Neutrophils # 5.60 1.80 - 7.30 E9/L    Immature Granulocytes # 0.03 E9/L    Lymphocytes # 1.06 (L) 1.50 - 4.00 E9/L    Monocytes # 0.35 0.10 - 0.95 E9/L    Eosinophils # 0.17 0.05 - 0.50 E9/L    Basophils # 0.01 0.00 - 0.20 E9/L   Comprehensive Metabolic Panel w/ Reflex to MG   Result Value Ref Range    Sodium 142 132 - 146 mmol/L    Potassium reflex Magnesium 4.7 3.5 - 5.0 mmol/L    Chloride 106 98 - 107 mmol/L    CO2 24 22 - 29 mmol/L    Anion Gap 12 7 - 16 mmol/L    Glucose 103 (H) 74 - 99 mg/dL    BUN 16 8 - 23 mg/dL    CREATININE 0.9 0.5 - 1.0 mg/dL    GFR Non-African American >60 >=60 mL/min/1.73    GFR African American >60     Calcium 8.8 8.6 - 10.2 mg/dL    Total Protein 6.7 6.4 - 8.3 g/dL    Alb 3.5 3.5 - 5.2 g/dL    Total Bilirubin 0.3 0.0 - 1.2 mg/dL    Alkaline Phosphatase 76 35 - 104 U/L    ALT 6 0 - 32 U/L    AST 14 0 - 31 U/L   Lipase   Result Value Ref Range    Lipase 24 13 - 60 U/L       RADIOLOGY:  Interpreted by Radiologist.  XR CHEST STANDARD (2 VW)   Final Result      Cardiomegaly with median sternotomy      Mild diffuse interstitial pulmonary edema         CT ABDOMEN PELVIS WO CONTRAST Additional Contrast? None   Final Result   Hepatomegaly, possible cirrhosis    There are bilateral infiltrates at the lung bases, likely related to   atelectasis    Renal masses, statistically likely cysts. Umbilical hernia                                ------------------------- NURSING NOTES AND VITALS REVIEWED ---------------------------   The nursing notes within the ED encounter and vital signs as below have been reviewed by myself. BP (!) 160/71   Pulse 98   Temp 98.6 °F (37 °C) (Oral)   Resp 18   Ht 5' 1\" (1.549 m)   Wt 257 lb (116.6 kg)   LMP 01/01/1990   SpO2 95% Comment: patient at 85% room air upon arrival, placed on 2 L NC 02 now 95%  BMI 48.56 kg/m²   Oxygen Saturation Interpretation: Normal    The patients available past medical records and past encounters were reviewed. ------------------------------ ED COURSE/MEDICAL DECISION MAKING----------------------  Medications   ipratropium-albuterol (DUONEB) nebulizer solution 1 ampule (3 mLs Inhalation Given 3/4/19 0534)   ipratropium-albuterol (DUONEB) 0.5-2.5 (3) MG/3ML nebulizer solution (has no administration in time range)     Re-Evaluations:             Re-evaluation.   Patients symptoms are improving    This patient's ED course included: a personal history and physicial eaxmination    This patient has remained hemodynamically stable during their ED course. Counseling: The emergency provider has spoken with the patient and discussed todays results, in addition to providing specific details for the plan of care and counseling regarding the diagnosis and prognosis. Questions are answered at this time and they are agreeable with the plan.       --------------------------------- IMPRESSION AND DISPOSITION ---------------------------------    IMPRESSION  1. Nonspecific abdominal pain    2. [facility]        DISPOSITION  Disposition: Discharge to home  Patient condition is good        NOTE: This report was transcribed using voice recognition software.  Every effort was made to ensure accuracy; however, inadvertent computerized transcription errors may be present       PATRIZIA Montague - CNP  03/04/19 1547

## 2019-03-07 ENCOUNTER — HOSPITAL ENCOUNTER (OUTPATIENT)
Dept: INTERVENTIONAL RADIOLOGY/VASCULAR | Age: 64
Discharge: HOME | End: 2019-03-09
Payer: MEDICARE

## 2019-03-07 DIAGNOSIS — I70.222 ATHEROSCLEROSIS OF NATIVE ARTERIES OF EXTREMITIES WITH REST PAIN, LEFT LEG (HCC): Chronic | ICD-10-CM

## 2019-03-07 PROCEDURE — 93923 UPR/LXTR ART STDY 3+ LVLS: CPT

## 2019-03-15 ENCOUNTER — TELEPHONE (OUTPATIENT)
Dept: VASCULAR SURGERY | Age: 64
End: 2019-03-15

## 2019-03-18 ENCOUNTER — OFFICE VISIT (OUTPATIENT)
Dept: ORTHOPEDIC SURGERY | Age: 64
End: 2019-03-18
Payer: MEDICARE

## 2019-03-18 VITALS — HEIGHT: 61 IN | BODY MASS INDEX: 48.52 KG/M2 | WEIGHT: 257 LBS

## 2019-03-18 DIAGNOSIS — G56.01 RIGHT CARPAL TUNNEL SYNDROME: ICD-10-CM

## 2019-03-18 DIAGNOSIS — M65.341 TRIGGER FINGER, RIGHT RING FINGER: Primary | ICD-10-CM

## 2019-03-18 PROCEDURE — 3017F COLORECTAL CA SCREEN DOC REV: CPT | Performed by: ORTHOPAEDIC SURGERY

## 2019-03-18 PROCEDURE — 20550 NJX 1 TENDON SHEATH/LIGAMENT: CPT | Performed by: ORTHOPAEDIC SURGERY

## 2019-03-18 PROCEDURE — 4004F PT TOBACCO SCREEN RCVD TLK: CPT | Performed by: ORTHOPAEDIC SURGERY

## 2019-03-18 PROCEDURE — 99214 OFFICE O/P EST MOD 30 MIN: CPT | Performed by: ORTHOPAEDIC SURGERY

## 2019-03-18 PROCEDURE — G8598 ASA/ANTIPLAT THER USED: HCPCS | Performed by: ORTHOPAEDIC SURGERY

## 2019-03-18 PROCEDURE — G8417 CALC BMI ABV UP PARAM F/U: HCPCS | Performed by: ORTHOPAEDIC SURGERY

## 2019-03-18 PROCEDURE — G8484 FLU IMMUNIZE NO ADMIN: HCPCS | Performed by: ORTHOPAEDIC SURGERY

## 2019-03-18 PROCEDURE — G8427 DOCREV CUR MEDS BY ELIG CLIN: HCPCS | Performed by: ORTHOPAEDIC SURGERY

## 2019-03-18 RX ORDER — TRIAMCINOLONE ACETONIDE 40 MG/ML
10 INJECTION, SUSPENSION INTRA-ARTICULAR; INTRAMUSCULAR ONCE
Status: COMPLETED | OUTPATIENT
Start: 2019-03-18 | End: 2019-03-18

## 2019-03-18 RX ADMIN — TRIAMCINOLONE ACETONIDE 10 MG: 40 INJECTION, SUSPENSION INTRA-ARTICULAR; INTRAMUSCULAR at 14:24

## 2019-03-21 RX ORDER — OXYBUTYNIN CHLORIDE 5 MG/1
5 TABLET, EXTENDED RELEASE ORAL DAILY
Qty: 90 TABLET | Refills: 0 | Status: SHIPPED | OUTPATIENT
Start: 2019-03-21 | End: 2019-06-13 | Stop reason: SDUPTHER

## 2019-03-26 ENCOUNTER — OFFICE VISIT (OUTPATIENT)
Dept: ORTHOPEDIC SURGERY | Age: 64
End: 2019-03-26
Payer: MEDICAID

## 2019-03-26 DIAGNOSIS — M17.11 PRIMARY OSTEOARTHRITIS OF RIGHT KNEE: Primary | ICD-10-CM

## 2019-03-26 PROCEDURE — 20610 DRAIN/INJ JOINT/BURSA W/O US: CPT | Performed by: ORTHOPAEDIC SURGERY

## 2019-03-26 PROCEDURE — 99213 OFFICE O/P EST LOW 20 MIN: CPT | Performed by: ORTHOPAEDIC SURGERY

## 2019-03-27 ENCOUNTER — OFFICE VISIT (OUTPATIENT)
Dept: FAMILY MEDICINE CLINIC | Age: 64
End: 2019-03-27
Payer: MEDICARE

## 2019-03-27 VITALS
SYSTOLIC BLOOD PRESSURE: 153 MMHG | TEMPERATURE: 97.6 F | DIASTOLIC BLOOD PRESSURE: 103 MMHG | WEIGHT: 250 LBS | HEART RATE: 101 BPM | RESPIRATION RATE: 16 BRPM | OXYGEN SATURATION: 93 % | BODY MASS INDEX: 47.2 KG/M2 | HEIGHT: 61 IN

## 2019-03-27 DIAGNOSIS — I25.10 CORONARY ARTERY DISEASE INVOLVING NATIVE CORONARY ARTERY OF NATIVE HEART WITHOUT ANGINA PECTORIS: Chronic | ICD-10-CM

## 2019-03-27 DIAGNOSIS — Z79.4 TYPE 2 DIABETES MELLITUS WITH COMPLICATION, WITH LONG-TERM CURRENT USE OF INSULIN (HCC): Primary | ICD-10-CM

## 2019-03-27 DIAGNOSIS — F17.200 TOBACCO DEPENDENCE: ICD-10-CM

## 2019-03-27 DIAGNOSIS — G47.33 OSA AND COPD OVERLAP SYNDROME (HCC): ICD-10-CM

## 2019-03-27 DIAGNOSIS — E66.01 MORBID OBESITY DUE TO EXCESS CALORIES (HCC): ICD-10-CM

## 2019-03-27 DIAGNOSIS — J44.9 OSA AND COPD OVERLAP SYNDROME (HCC): ICD-10-CM

## 2019-03-27 DIAGNOSIS — E11.42 DIABETIC POLYNEUROPATHY ASSOCIATED WITH TYPE 2 DIABETES MELLITUS (HCC): ICD-10-CM

## 2019-03-27 DIAGNOSIS — E11.8 TYPE 2 DIABETES MELLITUS WITH COMPLICATION, WITH LONG-TERM CURRENT USE OF INSULIN (HCC): Primary | ICD-10-CM

## 2019-03-27 DIAGNOSIS — I70.222 ATHEROSCLEROSIS OF NATIVE ARTERIES OF EXTREMITIES WITH REST PAIN, LEFT LEG (HCC): ICD-10-CM

## 2019-03-27 DIAGNOSIS — F33.0 MAJOR DEPRESSIVE DISORDER, RECURRENT EPISODE, MILD (HCC): ICD-10-CM

## 2019-03-27 PROCEDURE — G8484 FLU IMMUNIZE NO ADMIN: HCPCS | Performed by: FAMILY MEDICINE

## 2019-03-27 PROCEDURE — G8417 CALC BMI ABV UP PARAM F/U: HCPCS | Performed by: FAMILY MEDICINE

## 2019-03-27 PROCEDURE — G8427 DOCREV CUR MEDS BY ELIG CLIN: HCPCS | Performed by: FAMILY MEDICINE

## 2019-03-27 PROCEDURE — 3023F SPIROM DOC REV: CPT | Performed by: FAMILY MEDICINE

## 2019-03-27 PROCEDURE — 3046F HEMOGLOBIN A1C LEVEL >9.0%: CPT | Performed by: FAMILY MEDICINE

## 2019-03-27 PROCEDURE — 2022F DILAT RTA XM EVC RTNOPTHY: CPT | Performed by: FAMILY MEDICINE

## 2019-03-27 PROCEDURE — 4004F PT TOBACCO SCREEN RCVD TLK: CPT | Performed by: FAMILY MEDICINE

## 2019-03-27 PROCEDURE — 99215 OFFICE O/P EST HI 40 MIN: CPT | Performed by: FAMILY MEDICINE

## 2019-03-27 PROCEDURE — G8926 SPIRO NO PERF OR DOC: HCPCS | Performed by: FAMILY MEDICINE

## 2019-03-27 PROCEDURE — 3017F COLORECTAL CA SCREEN DOC REV: CPT | Performed by: FAMILY MEDICINE

## 2019-03-27 PROCEDURE — 83036 HEMOGLOBIN GLYCOSYLATED A1C: CPT | Performed by: FAMILY MEDICINE

## 2019-03-27 PROCEDURE — G8598 ASA/ANTIPLAT THER USED: HCPCS | Performed by: FAMILY MEDICINE

## 2019-03-27 RX ORDER — ROSUVASTATIN CALCIUM 5 MG/1
5 TABLET, COATED ORAL DAILY
Qty: 30 TABLET | Refills: 2 | Status: ON HOLD | OUTPATIENT
Start: 2019-03-27 | End: 2019-04-18 | Stop reason: SDUPTHER

## 2019-03-28 ASSESSMENT — ENCOUNTER SYMPTOMS
BACK PAIN: 0
NAUSEA: 0
SORE THROAT: 0
TROUBLE SWALLOWING: 0
VOMITING: 0
SHORTNESS OF BREATH: 0
COUGH: 1
SINUS PAIN: 0
EYE PAIN: 0
CONSTIPATION: 0
SINUS PRESSURE: 0
DIARRHEA: 0
RHINORRHEA: 0
ABDOMINAL PAIN: 0

## 2019-04-03 ENCOUNTER — TELEPHONE (OUTPATIENT)
Dept: FAMILY MEDICINE CLINIC | Age: 64
End: 2019-04-03

## 2019-04-03 ENCOUNTER — OFFICE VISIT (OUTPATIENT)
Dept: NEUROLOGY | Age: 64
End: 2019-04-03
Payer: MEDICARE

## 2019-04-03 VITALS — RESPIRATION RATE: 12 BRPM | WEIGHT: 252 LBS | BODY MASS INDEX: 46.38 KG/M2 | HEIGHT: 62 IN

## 2019-04-03 DIAGNOSIS — R25.1 INTERMITTENT TREMOR: Primary | ICD-10-CM

## 2019-04-03 DIAGNOSIS — I63.9 CEREBELLAR INFARCT (HCC): Chronic | ICD-10-CM

## 2019-04-03 DIAGNOSIS — I69.398 IMBALANCE DUE TO OLD STROKE: ICD-10-CM

## 2019-04-03 DIAGNOSIS — R26.89 IMBALANCE DUE TO OLD STROKE: ICD-10-CM

## 2019-04-03 PROCEDURE — G8417 CALC BMI ABV UP PARAM F/U: HCPCS | Performed by: PSYCHIATRY & NEUROLOGY

## 2019-04-03 PROCEDURE — 99204 OFFICE O/P NEW MOD 45 MIN: CPT | Performed by: PSYCHIATRY & NEUROLOGY

## 2019-04-03 PROCEDURE — 3017F COLORECTAL CA SCREEN DOC REV: CPT | Performed by: PSYCHIATRY & NEUROLOGY

## 2019-04-03 PROCEDURE — G8427 DOCREV CUR MEDS BY ELIG CLIN: HCPCS | Performed by: PSYCHIATRY & NEUROLOGY

## 2019-04-03 PROCEDURE — 4004F PT TOBACCO SCREEN RCVD TLK: CPT | Performed by: PSYCHIATRY & NEUROLOGY

## 2019-04-03 PROCEDURE — G8598 ASA/ANTIPLAT THER USED: HCPCS | Performed by: PSYCHIATRY & NEUROLOGY

## 2019-04-03 ASSESSMENT — ENCOUNTER SYMPTOMS
EYES NEGATIVE: 1
RESPIRATORY NEGATIVE: 1
GASTROINTESTINAL NEGATIVE: 1
ALLERGIC/IMMUNOLOGIC NEGATIVE: 1
BACK PAIN: 1

## 2019-04-03 NOTE — TELEPHONE ENCOUNTER
Pt called states she saw the neurologist today and was told that she had a previous mini stroke which would be causing her tremors and wants Dr. Jayne Ye to call her back in regards to this.

## 2019-04-03 NOTE — PROGRESS NOTES
Neurology Consult Note:    Patient: Jo Robertson  : 1955  Date: 19  Referring provider: Marcelino Pyle DO    Re: hx intermittent tremors, hx imbalance; hx remote, chronic rt. cerebellar ischemic stroke. Dear Marcelino Pyle DO     Thank you for your referral of Jo Robertson the Neurology clinic, a 78-year-old morbidly obese woman with history of type II diabetes mellitus, chronic tobacco and marijuana use, coronary artery disease, hyperlipidemia, ischemic cardiomyopathy, peripheral vascular disease, history of MI, and hypertension with prior history of remote right cerebellar ischemic infarct noted on her head CT scan in January of this year and seen on prior head CT of 18. She is referred for history of intermittent tremor and also has a history of disequilibrium. An MR brain scan was ordered on 19, still pending completion. She has chronic diabetic peripheral neuropathy. She has DJD of both knees and complains of left>right knee joint pain. She has chronic degenerative lower back pain. She is morbidly obese. She uses a rolling walker for ambulatory support x many yrs. per patient. She presents with a female caregiver. Lab Data: Reviewed from 3/4/19 and 19, urine drug screen positive for cannabinoids; glucose 103    Imaging Data: 19, MR brain scan without contrast, ordered, pending, Dr. Tucker Carter. [place] head CT, reviewed:   1. No CT evidence of acute intracranial abnormality, as questioned. If   there is further clinical concern, MRI of the brain would be   recommended for more detailed evaluation. .   2. Wedge-shaped hypodensity right cerebellum, seen on previous exam   2019 and 2018 consistent with a remote cerebrovascular   infarction of the right cerebellum.    3. Debris or soft tissue density noted in the bilateral external   auditory canals, nonspecific finding, findings could reflect   interstitial room and, with the possibility of an external auditory  cetirizine (ZYRTEC) 10 MG tablet Take 1 tablet by mouth daily 30 tablet 3    hydrocortisone 2.5 % cream APPLY TO AFFECTED AREA TWICE DAILY 28 g 3    lidocaine (XYLOCAINE) 5 % ointment APPLY TOPICALLY AS NEEDED FOR PAIN 35.44 g 11    Mirabegron ER 50 MG TB24 Take 50 mg by mouth daily 30 tablet 2    DULoxetine (CYMBALTA) 60 MG extended release capsule TAKE ONE CAPSULE BY MOUTH TWICE A  capsule 1    Insulin Syringe-Needle U-100 31G X 5/16\" 1 ML MISC 1 each by Does not apply route daily Use as directed 100 each 11    clopidogrel (PLAVIX) 75 MG tablet Take 1 tablet by mouth daily 30 tablet 3    gabapentin (NEURONTIN) 300 MG capsule Take 600 mg by mouth 3 times daily. Hector Marley olopatadine (PATADAY) 0.2 % SOLN ophthalmic solution Place 1 drop into both eyes daily      metoclopramide (REGLAN) 5 MG tablet Take 5 mg by mouth See Admin Instructions 1 tab daily with each meal and two tabs nightly      bumetanide (BUMEX) 1 MG tablet Take 1 mg by mouth See Admin Instructions DR. Harsha Barton stopped the bumex while on the prednisone      Nutritional Supplements (GLUCOSE MANAGEMENT) TABS Use daily prn if low glucose <60 30 tablet 2    Insulin Pen Needle (B-D UF III MINI PEN NEEDLES) 31G X 5 MM MISC 1 each by Does not apply route daily 100 each 3    clotrimazole-betamethasone (LOTRISONE) 1-0.05 % cream Apply topically 2 times daily. (Patient taking differently: Apply topically 2 times daily Apply topically both feet daily) 1 Tube 0    nitroGLYCERIN (NITROSTAT) 0.4 MG SL tablet up to max of 3 total doses. If no relief after 1 dose, call 911. 25 tablet 3    vitamin D (ERGOCALCIFEROL) 92695 units CAPS capsule Take 1 capsule by mouth once a week On Sundays 12 capsule 0    miconazole (MICOTIN) 2 % cream Apply topically 2 times daily. 1 Tube 1    diphenoxylate-atropine (LOMOTIL) 2.5-0.025 MG per tablet Take 1 tablet by mouth 4 times daily as needed for Diarrhea. .      Omega-3 Fatty Acids (FISH OIL OMEGA-3 PO) Take 1,000 mg by mouth daily       ursodiol (ACTIGALL) 300 MG capsule Take 300 mg by mouth 2 times daily      mometasone-formoterol (DULERA) 200-5 MCG/ACT inhaler Inhale 2 puffs into the lungs every 12 hours      Multiple Vitamins-Minerals (THERAPEUTIC MULTIVITAMIN-MINERALS) tablet Take 1 tablet by mouth daily      albuterol (PROVENTIL) (2.5 MG/3ML) 0.083% nebulizer solution Take 2.5 mg by nebulization every 6 hours as needed for Wheezing      acetaminophen (TYLENOL) 500 MG tablet Take 1,000 mg by mouth every 6 hours as needed for Pain      SOFT TOUCH LANCETS MISC Test 3 times daily 100 each 3    pantoprazole (PROTONIX) 40 MG tablet TAKE 1 TABLET BY MOUTH TWICE A DAY  2    montelukast (SINGULAIR) 10 MG tablet Take 10 mg by mouth nightly       fluticasone (FLONASE) 50 MCG/ACT nasal spray 1 spray by Nasal route 2 times daily (Patient taking differently: 2 sprays by Nasal route 2 times daily ) 1 Bottle 3    BiPAP Machine MISC by Does not apply route nightly 27/23      ACCU-CHEK FASTCLIX LANCETS MISC 5 times daily       No current facility-administered medications for this visit.         Allergies   Allergen Reactions    Latex Hives    Bee Venom Anaphylaxis    Dilaudid [Hydromorphone Hcl] Itching    Dye [Iodides] Hives and Shortness Of Breath    Percocet [Oxycodone-Acetaminophen] Shortness Of Breath and Itching    Keflex [Cephalexin] Itching and Rash    Lasix [Furosemide] Other (See Comments)     Pt states she cramps up and gets headaches    Levaquin [Levofloxacin In D5w] Hives    Lipitor      MUSCLE SPASMS    Lyrica [Pregabalin]      Dream disturbances    Morphine Hives    Naproxen      Unsure of reaction;pt states able to take Aleve without difficulties    Nefazodone Other (See Comments)    Norvasc [Amlodipine] Swelling    Oxycodone-Acetaminophen Swelling    Shellfish-Derived Products     Trazodone And Nefazodone        Patient Active Problem List   Diagnosis    Morbid obesity due to excess calories ([address]    Hyperlipidemia    DAYAN and COPD overlap syndrome ([address]    Vitamin D insufficiency    Essential hypertension    GERD (gastroesophageal reflux disease)    Major depressive disorder, recurrent episode, mild (HCC)    Diabetic polyneuropathy associated with type 2 diabetes mellitus (HCC)    Chronic passive hepatic congestion    Mixed incontinence urge and stress    Chronic back pain - d/t muscle spasm    Glaucoma, open angle    Elevated CA 19-9 level    Lumbar stenosis    Spondylosis 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    Lumbar disc herniation    Asymmetric septal hypertrophy (HCC)    Non-compliance    Hiatal hernia    Melanosis coli    Coronary artery disease involving native coronary artery of native heart without angina pectoris    DM2 (diabetes mellitus, type 2) ([address]    Cigarette smoker    Marijuana use, smoked    Ischemic cardiomyopathy    PVD (peripheral vascular disease) ([address]    Chronic venous insufficiency    History of non-ST elevation myocardial infarction (NSTEMI)    QT prolongation    Atherosclerosis of native arteries of extremities with rest pain, left leg (HCC)    Cerebellar infarct ([address]       Past Medical History:   Diagnosis Date    Asthma     Atherosclerosis of native artery of left leg with rest pain ([address] 11/9/2018    C. difficile diarrhea 2015    CAD (coronary artery disease) 2011    Cellulitis and abscess of trunk 4/14/2015    Cerebellar infarct ([address] 4/3/2019    Remote, rt. cerebellum, head CT scan, 1/9/19    Chronic back pain     Chronic kidney disease     Chronic systolic congestive heart failure ([address] 10/4/2013    Chronic venous insufficiency 10/11/2017    COPD (chronic obstructive pulmonary disease) (HCC)     Depression     Diabetes mellitus (HCC)     Diabetic neuropathy ([address]     Diverticulosis     Fatty liver 1/8/2016    per [place]    Glaucoma, open angle 2/1/2016    Mild-OU    Hepatic encephalopathy ([address] 02/07/2016    resolved    Hiatal hernia     History of blood transfusion     Hyperlipidemia     Hyperplastic colon polyp     Hypertension     Incontinence     Liver mass     Morbid obesity ([address]     Movement disorder     Myocardial infarction [facility]) 2011    Osteoarthritis, generalized     Pain in both lower legs 10/11/2017    [facility]ipheral vascular disease [facility]     Pneumonia 1/26/2014    Pulmonary edema     resolved    PVD (peripheral vascular disease) with claudication ([facility] 8/30/2017    Sleep apnea     uses BI PAP    Tobacco abuse     Tobacco abuse 10/11/2017    Tubular adenoma polyp of rectum     Urinary tract infection due to ESBL Klebsiella 03/08/2017    Ventral hernia        Past Surgical History:   Procedure Laterality Date    ANGIOPLASTY  11/13/2018    Dr. Sumner Coples & athrectomy L SFA & Popliteal    BREAST SURGERY  2000    bilateral reduction    BRONCHIAL BRUSH BIOPSY  1/25/2013    Dr Soler Boehringer  04/20/2015    Dr. David Gaytan  12/2011     DR. Claudia Galan,  follows Dr Justino Nunez  11/15/2013    Dr Aldo Fortune    COLONOSCOPY  12/23/2016    Dr Jose Angel Vincent adenoma & hyperplastic polyps, melanosis coli (repeat one year 12/2017)    CORONARY ARTERY BYPASS GRAFT      ECHO COMPLETE  10/9/2013         ENDOSCOPY, COLON, DIAGNOSTIC  04/18/2017    GALLBLADDER SURGERY      gallstones removed, [facility] 8/2017    HYSTERECTOMY      JOINT REPLACEMENT  2011    LEFT KNEE    KNEE SURGERY      left knee replacement    LAYER WOUND CLOSURE Left [id number]    LIVER BIOPSY  1/8/2016     E's    POLYSOMNOGRAPHY  1/2016    [facility]    UPPER GASTROINTESTINAL ENDOSCOPY  12/20/12    UPPER GASTROINTESTINAL ENDOSCOPY  12/23/2016    Dr Zahraa Grijalva UPPER GASTROINTESTINAL ENDOSCOPY  02/08/2017       Family History   Problem Relation Age of Onset    Cancer Mother     Asthma Father        Social History     Socioeconomic History    Marital status:      Spouse name: Not on file    Number of children: 2    Years of education: Not on file    Highest education level: Not on file   Occupational History    Occupation: disability   Social Needs    Financial resource strain: Not on file    Food insecurity:     Worry: Not on file     Inability: Not on file   [employer] needs:     Medical: Not on file     Non-medical: Not on file   Tobacco Use    Smoking status: Current Every Day Smoker     Packs/day: 0.25     Years: 40.00     Pack years: 10.00     Types: Cigarettes     Start date: 8/10/1974    Smokeless tobacco: Never Used    Tobacco comment: 1 pack every 3 days (6-7 cig)   Substance and Sexual Activity    Alcohol use: No     Alcohol/week: 0.0 oz    Drug use: Yes     Types: Marijuana     Comment: \"every 4th or 5th day out of the week\"    Sexual activity: Never   Lifestyle    Physical activity:     Days per week: Not on file     Minutes per session: Not on file    Stress: Not on file   Relationships    Social connections:     Talks on phone: Not on file     Gets together: Not on file     Attends Mosque service: Not on file     Active member of club or organization: Not on file     Attends meetings of clubs or organizations: Not on file     Relationship status: Not on file    Intimate partner violence:     Fear of current or ex partner: Not on file     Emotionally abused: Not on file     Physically abused: Not on file     Forced sexual activity: Not on file   Other Topics Concern    Not on file   Social History Narrative    Pt lives with brother in her house-pt has never driven a car and depends on transportation     Review of Systems   Constitutional: Positive for fatigue. HENT: Negative. Eyes: Negative. Respiratory: Negative. Cardiovascular: Negative. Gastrointestinal: Negative. Endocrine:        Chronic diabetic PNP   Genitourinary: Negative.     Musculoskeletal: Positive for arthralgias, back pain and gait problem. Skin: Negative. Allergic/Immunologic: Negative. Neurological:        Hx chronic rt. Cerebellar infarct; hx CTS   Hematological: Negative. Psychiatric/Behavioral: The patient is nervous/anxious. All other systems reviewed and are negative. Neurologic Exam:  Resp 12   Ht 5' 2\" (1.575 m)   Wt 252 lb (114.3 kg)   LMP 01/01/1990   BMI 46.09 kg/m²   General Appearance: Alert, anxious, morbidly obese, seated on the exam table in the company of her caregiver, no acute distress  HEENT: Normocephalic/atraumatic. Neck: Supple; no carotid bruits heard to auscultation  Cardiac: Regular rate  Respiratory: Productive wet cough consistent with bronchitis, no acute respiratory distress  Extremities: Obese, mild peripheral pedal/ankle edema  Skin: No apparent lesions or rashes  Musculoskeletal: Negative bilateral straight leg raising test, no fasciculations, tremors or foot drop  Mental Status: Alert, oriented ×3  Speech/Language: Clear, grossly fluent  Attention span/Concentration: Grossly intact  Affect/Mood: Anxious  Insight/Judgement: Talia Oconnell of Knowledge/Current events: Unable to accurately assess  CN II-XII:     Pupils: Equal, reactive to light, 1.4 mm     EOM's: Full without nystagmus    Visual Fields: Full to confrontation    Fundi: Miosis from light  CN V: Normal sensation V1-V3  CN VII: No facial asymmetry, symmetric smile. CN VIII: chronic hearing loss   CN IX-XII: No palatal asymmetry, tongue midline  SCM/Trapezii: 5/5 power  Motor: Effort related but demonstrates 5/5 power in the upper and lower extremities without tremor or drift and normal motor tone without cogwheeling or spasticity, intact fine motor function of both hands, symmetric  DTR's: 1+ and symmetric in the upper extremities, 1+ patellar, 1+ ankle jerks, no ankle clonus, plantar's are flexor  Sensory: Reports grossly intact subjective sensation to light touch and sharp stick testing.  Testing of finer sensory modalities are not reliable in this patient. No right/left confusion. Coordination/Gait: Finger-to-nose testing is grossly intact without dysmetria. She is unable to perform heel-to-shin testing. She uses a rolling walker for ambulatory support. Assessment/Plan:  1. History reported of tremors as reason for referral but no tremors evident or observed at the time of this visit. 2. Remote history of right cerebellar ischemic stroke, chronic ambulatory dysfunction probably multifactorial etiology related to chronic diabetic peripheral neuropathy, prior cerebellar stroke, morbid obesity producing chronic mechanical gait dysfunction, chronic degenerative low back pain syndrome and bilateral knee DJD. 3. She and her caregiver were provided a copy of the MR brain scan order that you ordered on 1/9/19, pending completion. 4. You may consider having her follow-up with her ENT physician as well, for nonspecific debris or soft tissue density noted in the auditory canals, stated as a nonspecific finding on the head CT scan. 5. Medical management of her significant medical/vascular risk factors and chronic medical conditions including low-dose aspirin 81 mg daily is otherwise advisable for secondary stroke risk reduction. She was provided patient information on the topic of stroke prevention, ischemic stroke and tremor. Sincerely,      Seda Tyler MD    This note was created using speech recognition transcription software. Despite proofreading, there may be several typographical errors present that may affect the meaning of the content. Please call with any questions. Note: More than 50% of this 40-minute face-face visit time included counseling and coordination of care based on clinical impression, review of test results, treatment plan, risk factor reduction and patient and/or family education.

## 2019-04-09 ENCOUNTER — TELEPHONE (OUTPATIENT)
Dept: ADMINISTRATIVE | Age: 64
End: 2019-04-09

## 2019-04-09 RX ORDER — BACLOFEN 10 MG/1
TABLET ORAL
Qty: 30 TABLET | Refills: 0 | Status: SHIPPED | OUTPATIENT
Start: 2019-04-09 | End: 2019-06-26

## 2019-04-10 ENCOUNTER — APPOINTMENT (OUTPATIENT)
Dept: GENERAL RADIOLOGY | Age: 64
DRG: 202 | End: 2019-04-10
Payer: MEDICARE

## 2019-04-10 ENCOUNTER — HOSPITAL ENCOUNTER (INPATIENT)
Age: 64
LOS: 7 days | Discharge: HOME | DRG: 202 | End: 2019-04-18
Attending: EMERGENCY MEDICINE | Admitting: INTERNAL MEDICINE
Payer: MEDICARE

## 2019-04-10 ENCOUNTER — APPOINTMENT (OUTPATIENT)
Dept: NUCLEAR MEDICINE | Age: 64
DRG: 202 | End: 2019-04-10
Payer: MEDICARE

## 2019-04-10 DIAGNOSIS — J44.1 COPD EXACERBATION (HCC): Primary | ICD-10-CM

## 2019-04-10 LAB
ALBUMIN SERPL-MCNC: 3.8 G/DL (ref 3.5–5.2)
ALP BLD-CCNC: 74 U/L (ref 35–104)
ALT SERPL-CCNC: 18 U/L (ref 0–32)
AMORPHOUS: ABNORMAL
ANION GAP SERPL CALCULATED.3IONS-SCNC: 8 MMOL/L (ref 7–16)
AST SERPL-CCNC: 23 U/L (ref 0–31)
B.E.: -0.3 MMOL/L (ref -3–3)
BACTERIA: ABNORMAL /HPF
BASOPHILS ABSOLUTE: 0.02 E9/L (ref 0–0.2)
BASOPHILS RELATIVE PERCENT: 0.3 % (ref 0–2)
BILIRUB SERPL-MCNC: 0.3 MG/DL (ref 0–1.2)
BILIRUBIN URINE: NEGATIVE
BLOOD, URINE: NEGATIVE
BUN BLDV-MCNC: 22 MG/DL (ref 8–23)
CALCIUM SERPL-MCNC: 8.9 MG/DL (ref 8.6–10.2)
CHLORIDE BLD-SCNC: 103 MMOL/L (ref 98–107)
CLARITY: CLEAR
CO2: 29 MMOL/L (ref 22–29)
COHB: 4.1 % (ref 0–1.5)
COLOR: YELLOW
CREAT SERPL-MCNC: 1 MG/DL (ref 0.5–1)
CRITICAL: ABNORMAL
D DIMER: 350 NG/ML DDU
DATE ANALYZED: ABNORMAL
DATE OF COLLECTION: ABNORMAL
EOSINOPHILS ABSOLUTE: 0.14 E9/L (ref 0.05–0.5)
EOSINOPHILS RELATIVE PERCENT: 2 % (ref 0–6)
EPITHELIAL CELLS, UA: ABNORMAL /HPF
GFR AFRICAN AMERICAN: >60
GFR NON-AFRICAN AMERICAN: >60 ML/MIN/1.73
GLUCOSE BLD-MCNC: 148 MG/DL (ref 74–99)
GLUCOSE URINE: NEGATIVE MG/DL
HCO3: 25.5 MMOL/L (ref 22–26)
HCT VFR BLD CALC: 46.3 % (ref 34–48)
HEMOGLOBIN: 14.2 G/DL (ref 11.5–15.5)
HHB: 7.3 % (ref 0–5)
IMMATURE GRANULOCYTES #: 0.03 E9/L
IMMATURE GRANULOCYTES %: 0.4 % (ref 0–5)
INFLUENZA A BY PCR: NOT DETECTED
INFLUENZA B BY PCR: NOT DETECTED
KETONES, URINE: NEGATIVE MG/DL
LAB: ABNORMAL
LACTIC ACID: 1.5 MMOL/L (ref 0.5–2.2)
LEUKOCYTE ESTERASE, URINE: NEGATIVE
LYMPHOCYTES ABSOLUTE: 1.11 E9/L (ref 1.5–4)
LYMPHOCYTES RELATIVE PERCENT: 15.8 % (ref 20–42)
Lab: ABNORMAL
MAGNESIUM: 2.1 MG/DL (ref 1.6–2.6)
MCH RBC QN AUTO: 30.7 PG (ref 26–35)
MCHC RBC AUTO-ENTMCNC: 30.7 % (ref 32–34.5)
MCV RBC AUTO: 100 FL (ref 80–99.9)
METER GLUCOSE: 200 MG/DL (ref 74–99)
METHB: 0.1 % (ref 0–1.5)
MODE: ABNORMAL
MONOCYTES ABSOLUTE: 0.28 E9/L (ref 0.1–0.95)
MONOCYTES RELATIVE PERCENT: 4 % (ref 2–12)
NEUTROPHILS ABSOLUTE: 5.43 E9/L (ref 1.8–7.3)
NEUTROPHILS RELATIVE PERCENT: 77.5 % (ref 43–80)
NITRITE, URINE: NEGATIVE
O2 CONTENT: 17.3 ML/DL
O2 SATURATION: 92.4 % (ref 92–98.5)
O2HB: 88.5 % (ref 94–97)
OPERATOR ID: 7874
PATIENT TEMP: 37 C
PCO2: 46.3 MMHG (ref 35–45)
PDW BLD-RTO: 14.2 FL (ref 11.5–15)
PH BLOOD GAS: 7.36 (ref 7.35–7.45)
PH UA: 6 (ref 5–9)
PLATELET # BLD: 271 E9/L (ref 130–450)
PMV BLD AUTO: 10.5 FL (ref 7–12)
PO2: 67.2 MMHG (ref 60–100)
POTASSIUM REFLEX MAGNESIUM: 6.5 MMOL/L (ref 3.5–5)
PROTEIN UA: NORMAL MG/DL
RBC # BLD: 4.63 E12/L (ref 3.5–5.5)
RBC UA: ABNORMAL /HPF (ref 0–2)
REASON FOR REJECTION: NORMAL
REJECTED TEST: NORMAL
SODIUM BLD-SCNC: 140 MMOL/L (ref 132–146)
SOURCE, BLOOD GAS: ABNORMAL
SPECIFIC GRAVITY UA: 1.01 (ref 1–1.03)
THB: 13.9 G/DL (ref 11.5–16.5)
TIME ANALYZED: 1832
TOTAL PROTEIN: 7.4 G/DL (ref 6.4–8.3)
TROPONIN: <0.01 NG/ML (ref 0–0.03)
UROBILINOGEN, URINE: 1 E.U./DL
WBC # BLD: 7 E9/L (ref 4.5–11.5)
WBC UA: ABNORMAL /HPF (ref 0–5)

## 2019-04-10 PROCEDURE — 6370000000 HC RX 637 (ALT 250 FOR IP): Performed by: EMERGENCY MEDICINE

## 2019-04-10 PROCEDURE — 87502 INFLUENZA DNA AMP PROBE: CPT

## 2019-04-10 PROCEDURE — 82962 GLUCOSE BLOOD TEST: CPT

## 2019-04-10 PROCEDURE — 78582 LUNG VENTILAT&PERFUS IMAGING: CPT

## 2019-04-10 PROCEDURE — 87633 RESP VIRUS 12-25 TARGETS: CPT

## 2019-04-10 PROCEDURE — G0378 HOSPITAL OBSERVATION PER HR: HCPCS

## 2019-04-10 PROCEDURE — 85378 FIBRIN DEGRADE SEMIQUANT: CPT

## 2019-04-10 PROCEDURE — 80053 COMPREHEN METABOLIC PANEL: CPT

## 2019-04-10 PROCEDURE — 87486 CHLMYD PNEUM DNA AMP PROBE: CPT

## 2019-04-10 PROCEDURE — 36415 COLL VENOUS BLD VENIPUNCTURE: CPT

## 2019-04-10 PROCEDURE — 83605 ASSAY OF LACTIC ACID: CPT

## 2019-04-10 PROCEDURE — 99285 EMERGENCY DEPT VISIT HI MDM: CPT

## 2019-04-10 PROCEDURE — A9558 XE133 XENON 10MCI: HCPCS | Performed by: RADIOLOGY

## 2019-04-10 PROCEDURE — 93005 ELECTROCARDIOGRAM TRACING: CPT | Performed by: STUDENT IN AN ORGANIZED HEALTH CARE EDUCATION/TRAINING PROGRAM

## 2019-04-10 PROCEDURE — 94664 DEMO&/EVAL PT USE INHALER: CPT

## 2019-04-10 PROCEDURE — 87581 M.PNEUMON DNA AMP PROBE: CPT

## 2019-04-10 PROCEDURE — 6370000000 HC RX 637 (ALT 250 FOR IP): Performed by: STUDENT IN AN ORGANIZED HEALTH CARE EDUCATION/TRAINING PROGRAM

## 2019-04-10 PROCEDURE — 82805 BLOOD GASES W/O2 SATURATION: CPT

## 2019-04-10 PROCEDURE — 84484 ASSAY OF TROPONIN QUANT: CPT

## 2019-04-10 PROCEDURE — 85025 COMPLETE CBC W/AUTO DIFF WBC: CPT

## 2019-04-10 PROCEDURE — 2500000003 HC RX 250 WO HCPCS: Performed by: EMERGENCY MEDICINE

## 2019-04-10 PROCEDURE — 3430000000 HC RX DIAGNOSTIC RADIOPHARMACEUTICAL: Performed by: RADIOLOGY

## 2019-04-10 PROCEDURE — 87040 BLOOD CULTURE FOR BACTERIA: CPT

## 2019-04-10 PROCEDURE — 83735 ASSAY OF MAGNESIUM: CPT

## 2019-04-10 PROCEDURE — 87798 DETECT AGENT NOS DNA AMP: CPT

## 2019-04-10 PROCEDURE — 96374 THER/PROPH/DIAG INJ IV PUSH: CPT

## 2019-04-10 PROCEDURE — 99222 1ST HOSP IP/OBS MODERATE 55: CPT | Performed by: INTERNAL MEDICINE

## 2019-04-10 PROCEDURE — 71045 X-RAY EXAM CHEST 1 VIEW: CPT

## 2019-04-10 PROCEDURE — 94640 AIRWAY INHALATION TREATMENT: CPT

## 2019-04-10 PROCEDURE — 6370000000 HC RX 637 (ALT 250 FOR IP): Performed by: INTERNAL MEDICINE

## 2019-04-10 PROCEDURE — 81001 URINALYSIS AUTO W/SCOPE: CPT

## 2019-04-10 PROCEDURE — A9540 TC99M MAA: HCPCS | Performed by: RADIOLOGY

## 2019-04-10 RX ORDER — AZITHROMYCIN 250 MG/1
500 TABLET, FILM COATED ORAL ONCE
Status: COMPLETED | OUTPATIENT
Start: 2019-04-10 | End: 2019-04-10

## 2019-04-10 RX ORDER — PREDNISONE 20 MG/1
40 TABLET ORAL ONCE
Status: COMPLETED | OUTPATIENT
Start: 2019-04-10 | End: 2019-04-10

## 2019-04-10 RX ORDER — BUMETANIDE 0.25 MG/ML
0.5 INJECTION, SOLUTION INTRAMUSCULAR; INTRAVENOUS ONCE
Status: COMPLETED | OUTPATIENT
Start: 2019-04-10 | End: 2019-04-10

## 2019-04-10 RX ORDER — IPRATROPIUM BROMIDE AND ALBUTEROL SULFATE 2.5; .5 MG/3ML; MG/3ML
3 SOLUTION RESPIRATORY (INHALATION) ONCE
Status: COMPLETED | OUTPATIENT
Start: 2019-04-10 | End: 2019-04-10

## 2019-04-10 RX ORDER — XENON XE-133 10 MCI/1
10 GAS RESPIRATORY (INHALATION)
Status: COMPLETED | OUTPATIENT
Start: 2019-04-10 | End: 2019-04-10

## 2019-04-10 RX ORDER — IPRATROPIUM BROMIDE AND ALBUTEROL SULFATE 2.5; .5 MG/3ML; MG/3ML
1 SOLUTION RESPIRATORY (INHALATION) EVERY 4 HOURS PRN
Status: DISCONTINUED | OUTPATIENT
Start: 2019-04-10 | End: 2019-04-11

## 2019-04-10 RX ADMIN — Medication 6 MILLICURIE: at 16:27

## 2019-04-10 RX ADMIN — IPRATROPIUM BROMIDE AND ALBUTEROL SULFATE 1 AMPULE: .5; 3 SOLUTION RESPIRATORY (INHALATION) at 21:26

## 2019-04-10 RX ADMIN — IPRATROPIUM BROMIDE AND ALBUTEROL SULFATE 3 AMPULE: .5; 3 SOLUTION RESPIRATORY (INHALATION) at 14:54

## 2019-04-10 RX ADMIN — PREDNISONE 40 MG: 20 TABLET ORAL at 15:04

## 2019-04-10 RX ADMIN — BUMETANIDE 0.5 MG: 0.25 INJECTION INTRAMUSCULAR; INTRAVENOUS at 18:52

## 2019-04-10 RX ADMIN — AZITHROMYCIN 500 MG: 250 TABLET, FILM COATED ORAL at 17:36

## 2019-04-10 RX ADMIN — XENON XE-133 10 MILLICURIE: 10 GAS RESPIRATORY (INHALATION) at 16:27

## 2019-04-10 ASSESSMENT — ENCOUNTER SYMPTOMS
ABDOMINAL PAIN: 0
CONSTIPATION: 0
CHEST TIGHTNESS: 1
SORE THROAT: 0
BACK PAIN: 0
BLOOD IN STOOL: 0
COUGH: 1
SHORTNESS OF BREATH: 1
DIARRHEA: 0
NAUSEA: 0
VOMITING: 0
WHEEZING: 1

## 2019-04-10 ASSESSMENT — PAIN DESCRIPTION - LOCATION: LOCATION: HEAD

## 2019-04-10 ASSESSMENT — PAIN DESCRIPTION - DESCRIPTORS: DESCRIPTORS: ACHING

## 2019-04-10 ASSESSMENT — PAIN DESCRIPTION - PAIN TYPE: TYPE: ACUTE PAIN

## 2019-04-10 ASSESSMENT — PAIN DESCRIPTION - FREQUENCY: FREQUENCY: INTERMITTENT

## 2019-04-10 ASSESSMENT — PAIN SCALES - GENERAL: PAINLEVEL_OUTOF10: 0

## 2019-04-10 NOTE — ED NOTES
1530 UP to bathroom with o2 at 4 liters .  Sob with miguelitortion      Susy Santacruz RN  04/10/19 [id number]

## 2019-04-10 NOTE — ED NOTES
Bed: 20  Expected date:   Expected time:   Means of arrival:   Comments:  Shortness of Breath     Valeria Root RN  04/10/19 [id number]

## 2019-04-10 NOTE — ED PROVIDER NOTES
Patient is a 58year old female with history of asthma and COPD, peripheral artery disease who is presenting with cough, wheezing, and shortness of breath. She has her home health aid at bedside who is helping provide some of the history. Patient states about one week ago she started noticing worsening shortness of breath. Patient normally is on 4L of oxygen at night, but does not use any oxygen during the day. Patient also admits to coughing up blood the past week as well. She presented today out of concern because she had some chest pressure and fullness earlier. Denies any nausea, vomiting, abdominal pain, diarrhea, constipation, but does have some bladder discomfort. The history is provided by the patient. Review of Systems   Constitutional: Positive for fatigue. Negative for chills and fever. HENT: Positive for congestion. Negative for drooling and sore throat. Respiratory: Positive for cough, chest tightness, shortness of breath and wheezing. Cardiovascular: Positive for chest pain. Gastrointestinal: Negative for abdominal pain, blood in stool, constipation, diarrhea, nausea and vomiting. Genitourinary: Positive for dysuria. Negative for hematuria. Musculoskeletal: Negative for back pain. Skin: Negative for rash. Neurological: Negative for dizziness and light-headedness. Physical Exam   Constitutional: She is oriented to person, place, and time. She appears well-developed and well-nourished. No distress. Poor body habitus     HENT:   Head: Normocephalic and atraumatic. NC in place     Eyes: Conjunctivae and EOM are normal. Right eye exhibits no discharge. Left eye exhibits no discharge. Neck: Normal range of motion. Cardiovascular: Normal rate, regular rhythm and normal heart sounds. Exam reveals no friction rub. No murmur heard. Pulmonary/Chest: Tachypnea noted. She is in respiratory distress. She has decreased breath sounds.  She has wheezes in the right upper field, the right lower field, the left upper field and the left lower field. Abdominal: Soft. She exhibits no distension. There is no tenderness (generalized). There is no guarding. Musculoskeletal: Normal range of motion. She exhibits no edema. Neurological: She is alert and oriented to person, place, and time. No cranial nerve deficit. Skin: Skin is warm. She is not diaphoretic. No erythema. Nursing note and vitals reviewed. Procedures    MDM  Number of Diagnoses or Management Options  COPD exacerbation [facility]):   Diagnosis management comments: This is a 42-year-old female  With a past medical history of CHF and COPD who presented too the ED for evaluation of shortness of breath that has been ongoing for the past several weeks worse over the past couple days. This patient was given to me as a sign out from my colleagues initially began a workup on the patient. Patient did complain of hemoptysis and worsening shortness of breath with exertion. Patient had a chest x-ray which showed edema without any clean infiltrates. She was treated with Duonebs and steroids and placed on 4 liters of oxygen. Patient had a low probability for PE on VQ and the case was discussed with Dr. Farzaneh Madrid who agreed to admit the patient and requested that I give the patient Lasix however she has an allergy therefor I gave her Bumex and he requested a BNP, ABG and repeat potassium. ED Course as of Apr 10 1615   Wed Apr 10, 2019   1424 EKG: This EKG is signed and interpreted by me. Rate: 91  Rhythm: Sinus  Interpretation: non-specific EKG  Comparison: stable as compared to patient's most recent EKG      [JA]   1610 Took over case with Dr. Vidal Haile from Dr. Garcia and Dr. Bandar Barros. Introduced myself to patient and examined her. Patient has coarse breath sounds and diffuse wheezing throughout.  Took patient off oxygen at oxygen dropped to 90-92% at rest.    [BP]      ED Course User Index  [BP] Hi Ferreira DO  [JA] Nyasia Gomez MD       ED Course as of Apr 10 1815   Wed Apr 10, 2019   1424 EKG: This EKG is signed and interpreted by me. Rate: 91  Rhythm: Sinus  Interpretation: non-specific EKG  Comparison: stable as compared to patient's most recent EKG      [JA]   1610 Took over case with Dr. Rosa Rudolph from Dr. Ann An and Dr. Dutch Heimlich. Introduced myself to patient and examined her. Patient has coarse breath sounds and diffuse wheezing throughout. Took patient off oxygen at oxygen dropped to 90-92% at rest.    [BP]      ED Course User Index  [BP] Oneyda Charleszuri   [JA] Nyasia Gomez MD       --------------------------------------------- PAST HISTORY ---------------------------------------------  Past Medical History:  has a past medical history of Asthma, Atherosclerosis of native artery of left leg with rest pain ([address] C. difficile diarrhea, CAD (coronary artery disease), Cellulitis and abscess of trunk, Cerebellar infarct (HCC), Chronic back pain, Chronic kidney disease, Chronic systolic congestive heart failure ([address] Chronic venous insufficiency, COPD (chronic obstructive pulmonary disease) ([address] Depression, Diabetes mellitus ([address] Diabetic neuropathy ([address] Diverticulosis, Fatty liver, Glaucoma, open angle, Hepatic encephalopathy ([address] Hiatal hernia, History of blood transfusion, Hyperlipidemia, Hyperplastic colon polyp, Hypertension, Incontinence, Liver mass, Morbid obesity ([address] Movement disorder, Myocardial infarction ([address] Osteoarthritis, generalized, Pain in both lower legs, Peripheral vascular disease ([address] Pneumonia, Pulmonary edema, PVD (peripheral vascular disease) with claudication ([address] Sleep apnea, Tobacco abuse, Tobacco abuse, Tubular adenoma polyp of rectum, Urinary tract infection due to ESBL Klebsiella, and Ventral hernia. Past Surgical History:  has a past surgical history that includes knee surgery; Upper gastrointestinal endoscopy (12/20/12);  Coronary artery bypass graft; layer wound closure (Left, [id number]); Echo Complete (10/9/2013); polysomnography (1/2016); liver biopsy (1/8/2016); bronchial brush biopsy (1/25/2013); Breast surgery (2000); Cholecystectomy (YRS AGO); Cardiac surgery (12/2011); Cardiac catheterization (04/20/2015); Hysterectomy; joint replacement (2011); Upper gastrointestinal endoscopy (12/23/2016); Colonoscopy (11/15/2013); Colonoscopy (12/23/2016); Upper gastrointestinal endoscopy (02/08/2017); Endoscopy, colon, diagnostic (04/18/2017); Gallbladder surgery; and angioplasty (11/13/2018). Social History:  reports that she has been smoking cigarettes. She started smoking about 44 years ago. She has a 10.00 pack-year smoking history. She has never used smokeless tobacco. She reports that she has current or past drug history. Drug: Marijuana. She reports that she does not drink alcohol. Family History: family history includes Asthma in her father; Cancer in her mother. The patients home medications have been reviewed. Allergies: Latex; Bee venom; Dilaudid [hydromorphone hcl]; Dye [iodides]; Percocet [oxycodone-acetaminophen]; Keflex [cephalexin]; Lasix [furosemide]; Levaquin [levofloxacin in d5w]; Lipitor; Lyrica [pregabalin]; Morphine; Naproxen; Nefazodone; Norvasc [amlodipine]; Oxycodone-acetaminophen;  Shellfish-derived products; and Trazodone and nefazodone    -------------------------------------------------- RESULTS -------------------------------------------------    LABS:  Results for orders placed or performed during the hospital encounter of 04/10/19   RAPID INFLUENZA A/B ANTIGENS   Result Value Ref Range    Influenza A by PCR Not Detected Not Detected    Influenza B by PCR Not Detected Not Detected   CBC Auto Differential   Result Value Ref Range    WBC 7.0 4.5 - 11.5 E9/L    RBC 4.63 3.50 - 5.50 E12/L    Hemoglobin 14.2 11.5 - 15.5 g/dL    Hematocrit 46.3 34.0 - 48.0 %    .0 (H) 80.0 - 99.9 fL    MCH 30.7 26.0 - 35.0 pg    MCHC 30.7 Alkaline Phosphatase 74 35 - 104 U/L    ALT 18 0 - 32 U/L    AST 23 0 - 31 U/L   Troponin   Result Value Ref Range    Troponin <0.01 0.00 - 0.03 ng/mL   Microscopic Urinalysis   Result Value Ref Range    WBC, UA 1-3 0 - 5 /HPF    RBC, UA NONE 0 - 2 /HPF    Epi Cells FEW /HPF    Bacteria, UA RARE (A) /HPF    Amorphous, UA RARE    EKG 12 Lead   Result Value Ref Range    Ventricular Rate 91 BPM    Atrial Rate 91 BPM    P-R Interval 172 ms    QRS Duration 126 ms    Q-T Interval 420 ms    QTc Calculation (Bazett) 516 ms    P Axis 50 degrees    R Axis 63 degrees    T Axis 52 degrees       RADIOLOGY:        ------------------------- NURSING NOTES AND VITALS REVIEWED ---------------------------  Date / Time Roomed:  4/10/2019  2:02 PM  ED Bed Assignment:  20/20    The nursing notes within the ED encounter and vital signs as below have been reviewed. Patient Vitals for the past 24 hrs:   BP Temp Temp src Pulse Resp SpO2 Height Weight   04/10/19 1728 138/76 98.1 °F (36.7 °C) Oral 91 14 92 % -- --   04/10/19 1455 -- -- -- -- -- 97 % -- --   04/10/19 1453 -- -- -- -- -- 99 % -- --   04/10/19 1448 -- -- -- -- -- 96 % -- --   04/10/19 1411 135/66 98.8 °F (37.1 °C) -- 94 14 -- 5' 2\" (1.575 m) 250 lb (113.4 kg)       Oxygen Saturation Interpretation: Improved after treatment    ------------------------------------------ PROGRESS NOTES ------------------------------------------  Re-evaluation(s):  Time: 26  Patients symptoms are improving  Repeat physical examination is improved    Counseling:  I have spoken with the patient and discussed todays results, in addition to providing specific details for the plan of care and counseling regarding the diagnosis and prognosis. Their questions are answered at this time and they are agreeable with the plan of admission.    --------------------------------- ADDITIONAL PROVIDER NOTES ---------------------------------  Consultations:  Spoke with Dr. Haja Holt  Discussed case.   They will

## 2019-04-11 PROBLEM — J96.11 CHRONIC RESPIRATORY FAILURE WITH HYPOXIA AND HYPERCAPNIA (HCC): Chronic | Status: ACTIVE | Noted: 2019-04-10

## 2019-04-11 PROBLEM — J96.12 CHRONIC RESPIRATORY FAILURE WITH HYPOXIA AND HYPERCAPNIA (HCC): Chronic | Status: ACTIVE | Noted: 2019-04-10

## 2019-04-11 LAB
ANION GAP SERPL CALCULATED.3IONS-SCNC: 8 MMOL/L (ref 7–16)
BUN BLDV-MCNC: 21 MG/DL (ref 8–23)
CALCIUM SERPL-MCNC: 9.2 MG/DL (ref 8.6–10.2)
CHLORIDE BLD-SCNC: 98 MMOL/L (ref 98–107)
CO2: 32 MMOL/L (ref 22–29)
CREAT SERPL-MCNC: 1 MG/DL (ref 0.5–1)
EKG ATRIAL RATE: 91 BPM
EKG P AXIS: 50 DEGREES
EKG P-R INTERVAL: 172 MS
EKG Q-T INTERVAL: 420 MS
EKG QRS DURATION: 126 MS
EKG QTC CALCULATION (BAZETT): 516 MS
EKG R AXIS: 63 DEGREES
EKG T AXIS: 52 DEGREES
EKG VENTRICULAR RATE: 91 BPM
FILM ARRAY ADENOVIRUS: ABNORMAL
FILM ARRAY BORDETELLA PERTUSSIS: ABNORMAL
FILM ARRAY CHLAMYDOPHILIA PNEUMONIAE: ABNORMAL
FILM ARRAY CORONAVIRUS 229E: ABNORMAL
FILM ARRAY CORONAVIRUS HKU1: ABNORMAL
FILM ARRAY CORONAVIRUS NL63: ABNORMAL
FILM ARRAY INFLUENZA A VIRUS 09H1: ABNORMAL
FILM ARRAY INFLUENZA A VIRUS H1: ABNORMAL
FILM ARRAY INFLUENZA A VIRUS H3: ABNORMAL
FILM ARRAY INFLUENZA A VIRUS: ABNORMAL
FILM ARRAY INFLUENZA B: ABNORMAL
FILM ARRAY METAPNEUMOVIRUS: ABNORMAL
FILM ARRAY MYCOPLASMA PNEUMONIAE: ABNORMAL
FILM ARRAY PARAINFLUENZA VIRUS 1: ABNORMAL
FILM ARRAY PARAINFLUENZA VIRUS 2: ABNORMAL
FILM ARRAY PARAINFLUENZA VIRUS 3: ABNORMAL
FILM ARRAY PARAINFLUENZA VIRUS 4: ABNORMAL
FILM ARRAY RESPIRATORY SYNCITIAL VIRUS: ABNORMAL
FILM ARRAY RHINOVIRUS/ENTEROVIRUS: ABNORMAL
GFR AFRICAN AMERICAN: >60
GFR NON-AFRICAN AMERICAN: >60 ML/MIN/1.73
GLUCOSE BLD-MCNC: 174 MG/DL (ref 74–99)
L. PNEUMOPHILA SEROGP 1 UR AG: NORMAL
METER GLUCOSE: 147 MG/DL (ref 74–99)
METER GLUCOSE: 164 MG/DL (ref 74–99)
METER GLUCOSE: 226 MG/DL (ref 74–99)
METER GLUCOSE: 336 MG/DL (ref 74–99)
ORGANISM: ABNORMAL
POTASSIUM SERPL-SCNC: 5.1 MMOL/L (ref 3.5–5)
PRO-BNP: 663 PG/ML (ref 0–125)
PROCALCITONIN: 0.04 NG/ML (ref 0–0.08)
SODIUM BLD-SCNC: 138 MMOL/L (ref 132–146)
STREP PNEUMONIAE ANTIGEN, URINE: NORMAL

## 2019-04-11 PROCEDURE — 87450 HC DIRECT STREP B ANTIGEN: CPT

## 2019-04-11 PROCEDURE — 6360000002 HC RX W HCPCS: Performed by: INTERNAL MEDICINE

## 2019-04-11 PROCEDURE — 87186 SC STD MICRODIL/AGAR DIL: CPT

## 2019-04-11 PROCEDURE — 87077 CULTURE AEROBIC IDENTIFY: CPT

## 2019-04-11 PROCEDURE — 82962 GLUCOSE BLOOD TEST: CPT

## 2019-04-11 PROCEDURE — 6370000000 HC RX 637 (ALT 250 FOR IP): Performed by: INTERNAL MEDICINE

## 2019-04-11 PROCEDURE — 94640 AIRWAY INHALATION TREATMENT: CPT

## 2019-04-11 PROCEDURE — 80048 BASIC METABOLIC PNL TOTAL CA: CPT

## 2019-04-11 PROCEDURE — 2060000000 HC ICU INTERMEDIATE R&B

## 2019-04-11 PROCEDURE — 84145 PROCALCITONIN (PCT): CPT

## 2019-04-11 PROCEDURE — 83880 ASSAY OF NATRIURETIC PEPTIDE: CPT

## 2019-04-11 PROCEDURE — 87088 URINE BACTERIA CULTURE: CPT

## 2019-04-11 PROCEDURE — 96372 THER/PROPH/DIAG INJ SC/IM: CPT

## 2019-04-11 PROCEDURE — 99233 SBSQ HOSP IP/OBS HIGH 50: CPT | Performed by: INTERNAL MEDICINE

## 2019-04-11 PROCEDURE — 96375 TX/PRO/DX INJ NEW DRUG ADDON: CPT

## 2019-04-11 PROCEDURE — 2580000003 HC RX 258: Performed by: INTERNAL MEDICINE

## 2019-04-11 PROCEDURE — 36415 COLL VENOUS BLD VENIPUNCTURE: CPT

## 2019-04-11 PROCEDURE — 94660 CPAP INITIATION&MGMT: CPT

## 2019-04-11 RX ORDER — CLOPIDOGREL BISULFATE 75 MG/1
75 TABLET ORAL DAILY
Status: DISCONTINUED | OUTPATIENT
Start: 2019-04-11 | End: 2019-04-18 | Stop reason: HOSPADM

## 2019-04-11 RX ORDER — DIPHENOXYLATE HYDROCHLORIDE AND ATROPINE SULFATE 2.5; .025 MG/1; MG/1
1 TABLET ORAL 4 TIMES DAILY PRN
Status: DISCONTINUED | OUTPATIENT
Start: 2019-04-11 | End: 2019-04-18 | Stop reason: HOSPADM

## 2019-04-11 RX ORDER — METOPROLOL SUCCINATE 25 MG/1
25 TABLET, EXTENDED RELEASE ORAL 2 TIMES DAILY
Status: DISCONTINUED | OUTPATIENT
Start: 2019-04-11 | End: 2019-04-18 | Stop reason: HOSPADM

## 2019-04-11 RX ORDER — ONDANSETRON 2 MG/ML
4 INJECTION INTRAMUSCULAR; INTRAVENOUS EVERY 6 HOURS PRN
Status: DISCONTINUED | OUTPATIENT
Start: 2019-04-11 | End: 2019-04-11 | Stop reason: ALTCHOICE

## 2019-04-11 RX ORDER — HYDRALAZINE HYDROCHLORIDE 50 MG/1
100 TABLET, FILM COATED ORAL 3 TIMES DAILY
Status: DISCONTINUED | OUTPATIENT
Start: 2019-04-11 | End: 2019-04-18 | Stop reason: HOSPADM

## 2019-04-11 RX ORDER — ROSUVASTATIN CALCIUM 5 MG/1
5 TABLET, COATED ORAL DAILY
Status: DISCONTINUED | OUTPATIENT
Start: 2019-04-11 | End: 2019-04-18 | Stop reason: HOSPADM

## 2019-04-11 RX ORDER — DEXTROSE MONOHYDRATE 25 G/50ML
12.5 INJECTION, SOLUTION INTRAVENOUS PRN
Status: DISCONTINUED | OUTPATIENT
Start: 2019-04-11 | End: 2019-04-18 | Stop reason: HOSPADM

## 2019-04-11 RX ORDER — AZELASTINE HYDROCHLORIDE 137 UG/1
1 SPRAY, METERED NASAL 3 TIMES DAILY PRN
Refills: 3 | COMMUNITY
Start: 2019-03-29 | End: 2019-06-26

## 2019-04-11 RX ORDER — CETIRIZINE HYDROCHLORIDE 10 MG/1
10 TABLET ORAL DAILY
Status: DISCONTINUED | OUTPATIENT
Start: 2019-04-11 | End: 2019-04-16

## 2019-04-11 RX ORDER — LIDOCAINE 50 MG/G
OINTMENT TOPICAL PRN
Status: DISCONTINUED | OUTPATIENT
Start: 2019-04-11 | End: 2019-04-18 | Stop reason: HOSPADM

## 2019-04-11 RX ORDER — OLOPATADINE HYDROCHLORIDE 2 MG/ML
1 SOLUTION/ DROPS OPHTHALMIC DAILY
Status: DISCONTINUED | OUTPATIENT
Start: 2019-04-11 | End: 2019-04-11 | Stop reason: CLARIF

## 2019-04-11 RX ORDER — CLOTRIMAZOLE AND BETAMETHASONE DIPROPIONATE 10; .64 MG/G; MG/G
CREAM TOPICAL 2 TIMES DAILY
Status: DISCONTINUED | OUTPATIENT
Start: 2019-04-11 | End: 2019-04-18 | Stop reason: HOSPADM

## 2019-04-11 RX ORDER — DEXTROSE MONOHYDRATE 50 MG/ML
100 INJECTION, SOLUTION INTRAVENOUS PRN
Status: DISCONTINUED | OUTPATIENT
Start: 2019-04-11 | End: 2019-04-18 | Stop reason: HOSPADM

## 2019-04-11 RX ORDER — FLUTICASONE PROPIONATE 50 MCG
2 SPRAY, SUSPENSION (ML) NASAL 2 TIMES DAILY
Status: DISCONTINUED | OUTPATIENT
Start: 2019-04-11 | End: 2019-04-16

## 2019-04-11 RX ORDER — SODIUM CHLORIDE 0.9 % (FLUSH) 0.9 %
10 SYRINGE (ML) INJECTION PRN
Status: DISCONTINUED | OUTPATIENT
Start: 2019-04-11 | End: 2019-04-18 | Stop reason: HOSPADM

## 2019-04-11 RX ORDER — ASPIRIN 81 MG/1
81 TABLET, CHEWABLE ORAL DAILY
Status: DISCONTINUED | OUTPATIENT
Start: 2019-04-11 | End: 2019-04-18 | Stop reason: HOSPADM

## 2019-04-11 RX ORDER — SODIUM CHLORIDE 0.9 % (FLUSH) 0.9 %
10 SYRINGE (ML) INJECTION EVERY 12 HOURS SCHEDULED
Status: DISCONTINUED | OUTPATIENT
Start: 2019-04-11 | End: 2019-04-18 | Stop reason: HOSPADM

## 2019-04-11 RX ORDER — PANTOPRAZOLE SODIUM 40 MG/1
40 TABLET, DELAYED RELEASE ORAL 2 TIMES DAILY
Status: DISCONTINUED | OUTPATIENT
Start: 2019-04-11 | End: 2019-04-18 | Stop reason: HOSPADM

## 2019-04-11 RX ORDER — BUMETANIDE 1 MG/1
1 TABLET ORAL DAILY
Status: DISCONTINUED | OUTPATIENT
Start: 2019-04-11 | End: 2019-04-15

## 2019-04-11 RX ORDER — GUAIFENESIN 400 MG/1
400 TABLET ORAL EVERY 4 HOURS PRN
Status: DISCONTINUED | OUTPATIENT
Start: 2019-04-11 | End: 2019-04-18 | Stop reason: HOSPADM

## 2019-04-11 RX ORDER — DULOXETIN HYDROCHLORIDE 60 MG/1
60 CAPSULE, DELAYED RELEASE ORAL 2 TIMES DAILY
Status: DISCONTINUED | OUTPATIENT
Start: 2019-04-11 | End: 2019-04-18 | Stop reason: HOSPADM

## 2019-04-11 RX ORDER — ACETAMINOPHEN 650 MG
1 TABLET, EXTENDED RELEASE ORAL 3 TIMES DAILY PRN
Refills: 3 | Status: ON HOLD | COMMUNITY
Start: 2019-03-08 | End: 2019-09-24 | Stop reason: HOSPADM

## 2019-04-11 RX ORDER — URSODIOL 300 MG/1
300 CAPSULE ORAL 2 TIMES DAILY
Status: DISCONTINUED | OUTPATIENT
Start: 2019-04-11 | End: 2019-04-18 | Stop reason: HOSPADM

## 2019-04-11 RX ORDER — GABAPENTIN 300 MG/1
600 CAPSULE ORAL 3 TIMES DAILY
Status: DISCONTINUED | OUTPATIENT
Start: 2019-04-11 | End: 2019-04-18 | Stop reason: HOSPADM

## 2019-04-11 RX ORDER — QUETIAPINE FUMARATE 25 MG/1
25 TABLET, FILM COATED ORAL NIGHTLY
Status: DISCONTINUED | OUTPATIENT
Start: 2019-04-11 | End: 2019-04-18 | Stop reason: HOSPADM

## 2019-04-11 RX ORDER — UREA 10 %
1 LOTION (ML) TOPICAL NIGHTLY PRN
Status: DISCONTINUED | OUTPATIENT
Start: 2019-04-11 | End: 2019-04-18 | Stop reason: HOSPADM

## 2019-04-11 RX ORDER — MONTELUKAST SODIUM 10 MG/1
10 TABLET ORAL NIGHTLY
Status: DISCONTINUED | OUTPATIENT
Start: 2019-04-11 | End: 2019-04-18 | Stop reason: HOSPADM

## 2019-04-11 RX ORDER — OLOPATADINE HYDROCHLORIDE 2 MG/ML
1 SOLUTION/ DROPS OPHTHALMIC DAILY
Status: DISCONTINUED | OUTPATIENT
Start: 2019-04-11 | End: 2019-04-18 | Stop reason: HOSPADM

## 2019-04-11 RX ORDER — ERGOCALCIFEROL 1.25 MG/1
50000 CAPSULE ORAL WEEKLY
Status: DISCONTINUED | OUTPATIENT
Start: 2019-04-14 | End: 2019-04-18 | Stop reason: HOSPADM

## 2019-04-11 RX ORDER — BENZONATATE 100 MG/1
100 CAPSULE ORAL EVERY 8 HOURS
Status: DISCONTINUED | OUTPATIENT
Start: 2019-04-11 | End: 2019-04-18 | Stop reason: HOSPADM

## 2019-04-11 RX ORDER — METHYLPREDNISOLONE SODIUM SUCCINATE 40 MG/ML
40 INJECTION, POWDER, LYOPHILIZED, FOR SOLUTION INTRAMUSCULAR; INTRAVENOUS EVERY 12 HOURS
Status: DISCONTINUED | OUTPATIENT
Start: 2019-04-11 | End: 2019-04-12

## 2019-04-11 RX ORDER — IPRATROPIUM BROMIDE AND ALBUTEROL SULFATE 2.5; .5 MG/3ML; MG/3ML
1 SOLUTION RESPIRATORY (INHALATION) EVERY 4 HOURS
Status: DISCONTINUED | OUTPATIENT
Start: 2019-04-11 | End: 2019-04-15

## 2019-04-11 RX ORDER — M-VIT,TX,IRON,MINS/CALC/FOLIC 27MG-0.4MG
1 TABLET ORAL DAILY
Status: DISCONTINUED | OUTPATIENT
Start: 2019-04-11 | End: 2019-04-18 | Stop reason: HOSPADM

## 2019-04-11 RX ORDER — ACETAMINOPHEN 325 MG/1
650 TABLET ORAL EVERY 4 HOURS PRN
Status: DISCONTINUED | OUTPATIENT
Start: 2019-04-11 | End: 2019-04-18 | Stop reason: HOSPADM

## 2019-04-11 RX ORDER — OXYBUTYNIN CHLORIDE 5 MG/1
5 TABLET, EXTENDED RELEASE ORAL NIGHTLY
Status: DISCONTINUED | OUTPATIENT
Start: 2019-04-11 | End: 2019-04-18 | Stop reason: HOSPADM

## 2019-04-11 RX ORDER — BACLOFEN 10 MG/1
10 TABLET ORAL 2 TIMES DAILY
Status: DISCONTINUED | OUTPATIENT
Start: 2019-04-11 | End: 2019-04-18 | Stop reason: HOSPADM

## 2019-04-11 RX ORDER — GUAIFENESIN 600 MG/1
1200 TABLET, EXTENDED RELEASE ORAL 2 TIMES DAILY PRN
Status: DISCONTINUED | OUTPATIENT
Start: 2019-04-11 | End: 2019-04-11 | Stop reason: CLARIF

## 2019-04-11 RX ORDER — NICOTINE POLACRILEX 4 MG
15 LOZENGE BUCCAL PRN
Status: DISCONTINUED | OUTPATIENT
Start: 2019-04-11 | End: 2019-04-18 | Stop reason: HOSPADM

## 2019-04-11 RX ORDER — ISOSORBIDE MONONITRATE 60 MG/1
60 TABLET, EXTENDED RELEASE ORAL DAILY
Status: DISCONTINUED | OUTPATIENT
Start: 2019-04-11 | End: 2019-04-18 | Stop reason: HOSPADM

## 2019-04-11 RX ORDER — INSULIN GLARGINE 100 [IU]/ML
50 INJECTION, SOLUTION SUBCUTANEOUS 2 TIMES DAILY
Status: DISCONTINUED | OUTPATIENT
Start: 2019-04-11 | End: 2019-04-18 | Stop reason: HOSPADM

## 2019-04-11 RX ADMIN — ISOSORBIDE MONONITRATE 60 MG: 60 TABLET, EXTENDED RELEASE ORAL at 09:54

## 2019-04-11 RX ADMIN — LIDOCAINE: 50 OINTMENT TOPICAL at 20:14

## 2019-04-11 RX ADMIN — FLUTICASONE PROPIONATE 2 SPRAY: 50 SPRAY, METERED NASAL at 09:52

## 2019-04-11 RX ADMIN — SACUBITRIL AND VALSARTAN 1 TABLET: 24; 26 TABLET, FILM COATED ORAL at 20:27

## 2019-04-11 RX ADMIN — URSODIOL 300 MG: 300 CAPSULE ORAL at 23:28

## 2019-04-11 RX ADMIN — CETIRIZINE HYDROCHLORIDE 10 MG: 10 TABLET, FILM COATED ORAL at 09:54

## 2019-04-11 RX ADMIN — BUMETANIDE 1 MG: 1 TABLET ORAL at 05:35

## 2019-04-11 RX ADMIN — MONTELUKAST SODIUM 10 MG: 10 TABLET, FILM COATED ORAL at 20:13

## 2019-04-11 RX ADMIN — CLOTRIMAZOLE AND BETAMETHASONE DIPROPIONATE: 10; .5 CREAM TOPICAL at 20:14

## 2019-04-11 RX ADMIN — HYDRALAZINE HYDROCHLORIDE 100 MG: 50 TABLET, FILM COATED ORAL at 20:13

## 2019-04-11 RX ADMIN — INSULIN GLARGINE 50 UNITS: 100 INJECTION, SOLUTION SUBCUTANEOUS at 09:56

## 2019-04-11 RX ADMIN — BACLOFEN 10 MG: 10 TABLET ORAL at 09:52

## 2019-04-11 RX ADMIN — BENZONATATE 100 MG: 100 CAPSULE ORAL at 13:35

## 2019-04-11 RX ADMIN — MOMETASONE FUROATE AND FORMOTEROL FUMARATE DIHYDRATE 2 PUFF: 200; 5 AEROSOL RESPIRATORY (INHALATION) at 10:17

## 2019-04-11 RX ADMIN — LIDOCAINE: 50 OINTMENT TOPICAL at 10:08

## 2019-04-11 RX ADMIN — PANTOPRAZOLE SODIUM 40 MG: 40 TABLET, DELAYED RELEASE ORAL at 05:35

## 2019-04-11 RX ADMIN — GABAPENTIN 600 MG: 300 CAPSULE ORAL at 20:12

## 2019-04-11 RX ADMIN — MOMETASONE FUROATE AND FORMOTEROL FUMARATE DIHYDRATE 2 PUFF: 200; 5 AEROSOL RESPIRATORY (INHALATION) at 21:56

## 2019-04-11 RX ADMIN — CLOTRIMAZOLE AND BETAMETHASONE DIPROPIONATE: 10; .5 CREAM TOPICAL at 09:52

## 2019-04-11 RX ADMIN — Medication 10 ML: at 09:54

## 2019-04-11 RX ADMIN — IPRATROPIUM BROMIDE AND ALBUTEROL SULFATE 1 AMPULE: .5; 3 SOLUTION RESPIRATORY (INHALATION) at 21:56

## 2019-04-11 RX ADMIN — METHYLPREDNISOLONE SODIUM SUCCINATE 40 MG: 40 INJECTION, POWDER, LYOPHILIZED, FOR SOLUTION INTRAMUSCULAR; INTRAVENOUS at 18:07

## 2019-04-11 RX ADMIN — IPRATROPIUM BROMIDE AND ALBUTEROL SULFATE 1 AMPULE: .5; 3 SOLUTION RESPIRATORY (INHALATION) at 15:13

## 2019-04-11 RX ADMIN — SACUBITRIL AND VALSARTAN 1 TABLET: 24; 26 TABLET, FILM COATED ORAL at 09:53

## 2019-04-11 RX ADMIN — INSULIN GLARGINE 50 UNITS: 100 INJECTION, SOLUTION SUBCUTANEOUS at 20:36

## 2019-04-11 RX ADMIN — IPRATROPIUM BROMIDE AND ALBUTEROL SULFATE 1 AMPULE: .5; 3 SOLUTION RESPIRATORY (INHALATION) at 10:17

## 2019-04-11 RX ADMIN — ENOXAPARIN SODIUM 40 MG: 40 INJECTION SUBCUTANEOUS at 09:52

## 2019-04-11 RX ADMIN — IPRATROPIUM BROMIDE AND ALBUTEROL SULFATE 1 AMPULE: .5; 3 SOLUTION RESPIRATORY (INHALATION) at 01:41

## 2019-04-11 RX ADMIN — PANTOPRAZOLE SODIUM 40 MG: 40 TABLET, DELAYED RELEASE ORAL at 16:29

## 2019-04-11 RX ADMIN — FLUTICASONE PROPIONATE 2 SPRAY: 50 SPRAY, METERED NASAL at 20:27

## 2019-04-11 RX ADMIN — ROSUVASTATIN CALCIUM 5 MG: 5 TABLET, FILM COATED ORAL at 09:53

## 2019-04-11 RX ADMIN — CLOPIDOGREL BISULFATE 75 MG: 75 TABLET ORAL at 09:53

## 2019-04-11 RX ADMIN — HYDRALAZINE HYDROCHLORIDE 100 MG: 50 TABLET, FILM COATED ORAL at 09:52

## 2019-04-11 RX ADMIN — INSULIN LISPRO 2 UNITS: 100 INJECTION, SOLUTION INTRAVENOUS; SUBCUTANEOUS at 09:55

## 2019-04-11 RX ADMIN — QUETIAPINE FUMARATE 25 MG: 25 TABLET ORAL at 20:13

## 2019-04-11 RX ADMIN — HYDROCORTISONE: 25 CREAM TOPICAL at 09:52

## 2019-04-11 RX ADMIN — Medication 10 ML: at 05:36

## 2019-04-11 RX ADMIN — MICONAZOLE NITRATE: 2 OINTMENT TOPICAL at 20:27

## 2019-04-11 RX ADMIN — BENZONATATE 100 MG: 100 CAPSULE ORAL at 05:35

## 2019-04-11 RX ADMIN — DULOXETINE HYDROCHLORIDE 60 MG: 60 CAPSULE, DELAYED RELEASE ORAL at 20:13

## 2019-04-11 RX ADMIN — GABAPENTIN 600 MG: 300 CAPSULE ORAL at 09:53

## 2019-04-11 RX ADMIN — GABAPENTIN 600 MG: 300 CAPSULE ORAL at 13:35

## 2019-04-11 RX ADMIN — URSODIOL 300 MG: 300 CAPSULE ORAL at 09:53

## 2019-04-11 RX ADMIN — METOPROLOL SUCCINATE 25 MG: 25 TABLET, EXTENDED RELEASE ORAL at 20:13

## 2019-04-11 RX ADMIN — Medication 10 ML: at 18:08

## 2019-04-11 RX ADMIN — DULOXETINE HYDROCHLORIDE 60 MG: 60 CAPSULE, DELAYED RELEASE ORAL at 09:53

## 2019-04-11 RX ADMIN — Medication 1 TABLET: at 09:53

## 2019-04-11 RX ADMIN — OLOPATADINE HYDROCHLORIDE 1 DROP: 2 SOLUTION OPHTHALMIC at 13:35

## 2019-04-11 RX ADMIN — INSULIN LISPRO 4 UNITS: 100 INJECTION, SOLUTION INTRAVENOUS; SUBCUTANEOUS at 11:49

## 2019-04-11 RX ADMIN — ASPIRIN 81 MG 81 MG: 81 TABLET ORAL at 09:54

## 2019-04-11 RX ADMIN — HYDRALAZINE HYDROCHLORIDE 100 MG: 50 TABLET, FILM COATED ORAL at 13:35

## 2019-04-11 RX ADMIN — AZITHROMYCIN MONOHYDRATE 500 MG: 500 INJECTION, POWDER, LYOPHILIZED, FOR SOLUTION INTRAVENOUS at 18:29

## 2019-04-11 RX ADMIN — INSULIN LISPRO 8 UNITS: 100 INJECTION, SOLUTION INTRAVENOUS; SUBCUTANEOUS at 20:36

## 2019-04-11 RX ADMIN — BENZONATATE 100 MG: 100 CAPSULE ORAL at 20:27

## 2019-04-11 RX ADMIN — BACLOFEN 10 MG: 10 TABLET ORAL at 20:13

## 2019-04-11 RX ADMIN — OXYBUTYNIN CHLORIDE 5 MG: 5 TABLET, EXTENDED RELEASE ORAL at 20:13

## 2019-04-11 RX ADMIN — MICONAZOLE NITRATE: 2 OINTMENT TOPICAL at 11:19

## 2019-04-11 RX ADMIN — GUAIFENESIN 400 MG: 400 TABLET ORAL at 05:35

## 2019-04-11 RX ADMIN — METHYLPREDNISOLONE SODIUM SUCCINATE 40 MG: 40 INJECTION, POWDER, LYOPHILIZED, FOR SOLUTION INTRAMUSCULAR; INTRAVENOUS at 05:35

## 2019-04-11 RX ADMIN — HYDROCORTISONE: 25 CREAM TOPICAL at 20:14

## 2019-04-11 RX ADMIN — Medication 10 ML: at 20:13

## 2019-04-11 RX ADMIN — METOPROLOL SUCCINATE 25 MG: 25 TABLET, EXTENDED RELEASE ORAL at 09:53

## 2019-04-11 ASSESSMENT — PAIN DESCRIPTION - PAIN TYPE
TYPE: CHRONIC PAIN
TYPE: ACUTE PAIN

## 2019-04-11 ASSESSMENT — PAIN DESCRIPTION - PROGRESSION
CLINICAL_PROGRESSION: GRADUALLY WORSENING
CLINICAL_PROGRESSION: GRADUALLY WORSENING

## 2019-04-11 ASSESSMENT — PAIN DESCRIPTION - LOCATION
LOCATION: KNEE
LOCATION: KNEE;LEG
LOCATION: BACK;LEG
LOCATION: BACK;LEG

## 2019-04-11 ASSESSMENT — PAIN DESCRIPTION - ONSET
ONSET: ON-GOING
ONSET: ON-GOING

## 2019-04-11 ASSESSMENT — PAIN - FUNCTIONAL ASSESSMENT
PAIN_FUNCTIONAL_ASSESSMENT: PREVENTS OR INTERFERES SOME ACTIVE ACTIVITIES AND ADLS
PAIN_FUNCTIONAL_ASSESSMENT: PREVENTS OR INTERFERES SOME ACTIVE ACTIVITIES AND ADLS

## 2019-04-11 ASSESSMENT — PAIN DESCRIPTION - FREQUENCY
FREQUENCY: CONTINUOUS
FREQUENCY: INTERMITTENT

## 2019-04-11 ASSESSMENT — PAIN SCALES - GENERAL
PAINLEVEL_OUTOF10: 10
PAINLEVEL_OUTOF10: 7
PAINLEVEL_OUTOF10: 8
PAINLEVEL_OUTOF10: 10
PAINLEVEL_OUTOF10: 10

## 2019-04-11 ASSESSMENT — PAIN DESCRIPTION - DESCRIPTORS
DESCRIPTORS: ACHING
DESCRIPTORS: ACHING;SORE
DESCRIPTORS: SHARP;ACHING
DESCRIPTORS: ACHING;DISCOMFORT;SORE

## 2019-04-11 ASSESSMENT — PAIN DESCRIPTION - ORIENTATION
ORIENTATION: LEFT
ORIENTATION: RIGHT;LEFT

## 2019-04-11 NOTE — CARE COORDINATION
Social Work / Discharge Planning : Patient resides alone in a one story home. Patient has a cane, walker, rollator, bedside commode and showerchair. Patient  has a [employer] though [address] in which she will bleed her home 02 though as well. She does wear 4 liters at night. Patient  has a hx with [facility] . Patient is part of the [facility] program and her [facility]  is Fannie Mars [phone]. MISSY placed a call to Lizette Meckel to verify patient aide service agency and hours in the home and await response. PCP is Dr. Anner Riedel and primary pharmacy is [facility]. Patient does have a Pulmonology consult and await treatment plan for any additional needs at discharge. MISSY to follow.  Electronically signed by SINDHU Green on 4/11/19 at 12:33 PM

## 2019-04-11 NOTE — H&P
[place] [id number] [facility]     History and Physical      CHIEF COMPLAINT: Worsening cough, expectoration, wheezing, shortness of breath. History of Present Illness: The pt is a 58 y.o. female with a history of COPD, CAD, chronic systolic chf, HTN, CKD, DM2, HLD, morbid obesity, DAYAN and hypoxemia in sleep on Trilogy non-invasive support in sleep, ongoing cigarette smoking, who was doing ok until 4-5 days ago other than a cough with intermittent hemoptysis in the previous week. Pt has seen her Pulmonologist Dr. Zeke Bennett last week and he was doing some testing. Starting Sat 4/6/19, pt has developed increased cough with expectoration of purulent green sputum, wheezing and shortness of breath, along with fever / feverishness, chills and sweats. D/t increasing respiratory sx, pt came to ER today. Informant(s) for H&P: Patient.     REVIEW OF SYSTEMS:  Constitutional: positive for chills, sweats and feverishness  Eyes: negative  Ears, nose, mouth, throat, and face: positive for chronic nasal congestion; ear ache, negative for ear drainage, epistaxis, hoarseness, nasal congestion, sore throat, tinnitus and vertigo  Respiratory: positive for cough, dyspnea on exertion, hemoptysis, shortness of breath, sputum and wheezing, negative for pleurisy/chest pain and stridor  Cardiovascular: positive for right ACW tenderness and pain on coughing, negative for exertional chest pressure/discomfort, irregular heart beat, lower extremity edema, near-syncope, orthopnea, palpitations, paroxysmal nocturnal dyspnea and syncope  Gastrointestinal: positive for occasional dark stools and occasional loss of bowel control during paroxysms of coughing, negative for constipation, diarrhea, dysphagia, melena, nausea, reflux symptoms, vomiting and blood in stool  Genitourinary:negative for dysuria, hematuria and pyuria  Musculoskeletal:positive for myalgias and chronic left knee and left leg / calf pain  Neurological: Niall Red. Rosmery Villafana,  follows Dr Gaitan Pew  11/15/2013    Dr Norlene Seip    COLONOSCOPY  12/23/2016    Dr Jelani Gutierrez adenoma & hyperplastic polyps, melanosis coli (repeat one year 12/2017)    CORONARY ARTERY BYPASS GRAFT      ECHO COMPLETE  10/9/2013         ENDOSCOPY, COLON, DIAGNOSTIC  04/18/2017    GALLBLADDER SURGERY      gallstones removed, [facility] 8/2017    HYSTERECTOMY      JOINT REPLACEMENT  2011    LEFT KNEE    KNEE SURGERY      left knee replacement    LAYER WOUND CLOSURE Left [id number]    L[facility]OPSY  1/8/2016    [facility]    POLYSOMNOGRAPHY  1/2016    [facility]    UPPER GASTROINTESTINAL ENDOSCOPY  12/20/12    UPPER GASTROINTESTINAL ENDOSCOPY  12/23/2016    Dr Norlene Seip   [facility] ENDOSCOPY  02/08/2017     Medications Prior to Admission:    Medications Prior to Admission: baclofen (LIORESAL) 10 MG tablet, TAKE 1 TABLET BY MOUTH TWICE A DAY AS NEEDED  insulin lispro (HUMALOG KWIKPEN) 100 UNIT/ML pen, INJECT 35 UNITS SUBCUTANEOUSLY THREE TIMES A DAY BEFORE MEALS  Insulin Degludec (TRESIBA FLEXTOUCH) 200 UNIT/ML SOPN, Inject 50 Units into the skin 2 times daily  rosuvastatin (CRESTOR) 5 MG tablet, Take 1 tablet by mouth daily  oxybutynin (DITROPAN XL) 5 MG extended release tablet, Take 1 tablet by mouth daily  isosorbide mononitrate (IMDUR) 60 MG extended release tablet, Take 1 tablet by mouth daily  sacubitril-valsartan (ENTRESTO) 24-26 MG per tablet, Take 1 tablet by mouth 2 times daily  metoprolol succinate (TOPROL XL) 25 MG extended release tablet, Take 1 tablet by mouth 2 times daily  hydrALAZINE (APRESOLINE) 100 MG tablet, Take 1 tablet by mouth 3 times daily  QUEtiapine (SEROQUEL) 25 MG tablet, TAKE 1 TABLET BY MOUTH EVERY DAY IN THE EVENING  aspirin 81 MG chewable tablet, TAKE 1 TABLET BY MOUTH DAILY  insulin lispro (HUMALOG KWIKPEN) 100 UNIT/ML pen, Inject 35 units tid before meals  B-D UF III MINI PEN NEEDLES 31G X 5 MM MISC, USE EVERY DAY AS DIRECTED  blood glucose test strips (ACCU-CHEK COMPACT TEST DRUM) strip, 1 each by In Vitro route 3 times daily As needed. guaiFENesin (MUCINEX) 600 MG extended release tablet, Take 2 tablets by mouth 2 times daily as needed for Congestion  traZODone (DESYREL) 100 MG tablet, TAKE 1 TABLET BY MOUTH AT NIGHT  cetirizine (ZYRTEC) 10 MG tablet, Take 1 tablet by mouth daily  hydrocortisone 2.5 % cream, APPLY TO AFFECTED AREA TWICE DAILY  lidocaine (XYLOCAINE) 5 % ointment, APPLY TOPICALLY AS NEEDED FOR PAIN  Mirabegron ER 50 MG TB24, Take 50 mg by mouth daily  DULoxetine (CYMBALTA) 60 MG extended release capsule, TAKE ONE CAPSULE BY MOUTH TWICE A DAY  Insulin Syringe-Needle U-100 31G X 5/16\" 1 ML MISC, 1 each by Does not apply route daily Use as directed  clopidogrel (PLAVIX) 75 MG tablet, Take 1 tablet by mouth daily  gabapentin (NEURONTIN) 300 MG capsule, Take 600 mg by mouth 3 times daily. Charles Mccullough olopatadine (PATADAY) 0.2 % SOLN ophthalmic solution, Place 1 drop into both eyes daily  metoclopramide (REGLAN) 5 MG tablet, Take 5 mg by mouth See Admin Instructions 1 tab daily with each meal and two tabs nightly  bumetanide (BUMEX) 1 MG tablet, Take 1 mg by mouth See Admin Instructions DR. Toay Raymundo stopped the bumex while on the prednisone  Nutritional Supplements (GLUCOSE MANAGEMENT) TABS, Use daily prn if low glucose <60  Insulin Pen Needle (B-D UF III MINI PEN NEEDLES) 31G X 5 MM MISC, 1 each by Does not apply route daily  clotrimazole-betamethasone (LOTRISONE) 1-0.05 % cream, Apply topically 2 times daily. (Patient taking differently: Apply topically 2 times daily Apply topically both feet daily)  nitroGLYCERIN (NITROSTAT) 0.4 MG SL tablet, up to max of 3 total doses. If no relief after 1 dose, call 911. vitamin D (ERGOCALCIFEROL) 33629 units CAPS capsule, Take 1 capsule by mouth once a week On Sundays  miconazole (MICOTIN) 2 % cream, Apply topically 2 times daily.   diphenoxylate-atropine (LOMOTIL) 2.5-0.025 MG per tablet, Take 1 tablet by mouth 4 times daily as needed for Diarrhea. .  Omega-3 Fatty Acids (FISH OIL OMEGA-3 PO), Take 1,000 mg by mouth daily   ursodiol (ACTIGALL) 300 MG capsule, Take 300 mg by mouth 2 times daily  mometasone-formoterol (DULERA) 200-5 MCG/ACT inhaler, Inhale 2 puffs into the lungs every 12 hours  Multiple Vitamins-Minerals (THERAPEUTIC MULTIVITAMIN-MINERALS) tablet, Take 1 tablet by mouth daily  albuterol (PROVENTIL) (2.5 MG/3ML) 0.083% nebulizer solution, Take 2.5 mg by nebulization every 6 hours as needed for Wheezing  acetaminophen (TYLENOL) 500 MG tablet, Take 1,000 mg by mouth every 6 hours as needed for Pain  ACCU-CHEK FASTCLIX LANCETS MISC, 5 times daily  SOFT TOUCH LANCETS MISC, Test 3 times daily  pantoprazole (PROTONIX) 40 MG tablet, TAKE 1 TABLET BY MOUTH TWICE A DAY  montelukast (SINGULAIR) 10 MG tablet, Take 10 mg by mouth nightly   fluticasone (FLONASE) 50 MCG/ACT nasal spray, 1 spray by Nasal route 2 times daily (Patient taking differently: 2 sprays by Nasal route 2 times daily )  BiPAP Machine MISC, by Does not apply route nightly 27/23    Allergies:    Latex; Bee venom; Dilaudid [hydromorphone hcl]; Dye [iodides]; Percocet [oxycodone-acetaminophen]; Keflex [cephalexin]; Lasix [furosemide]; Levaquin [levofloxacin in d5w]; Lipitor; Lyrica [pregabalin]; Morphine; Naproxen; Nefazodone; Norvasc [amlodipine]; Oxycodone-acetaminophen; Shellfish-derived products; and Trazodone and nefazodone    Social History:    reports that she has been smoking cigarettes. She started smoking about 44 years ago. She has a 10.00 pack-year smoking history. She has never used smokeless tobacco. She reports that she has current or past drug history. Drug: Marijuana. She reports that she does not drink alcohol. Cigarettes: pt continues to smoke 2-3 cigarettes per day though she has not smoked at all last 4 days. 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 .     Family History:   family history includes Asthma in her father; Cancer in her mother. Father  in his early 25s of asthma. Mother  of colon cancer. 1 sister  of ICH d/t ruptured aneurysm. 2 sisters  of sarcoid / immune d/s. 1 brother  of prostate cancer. Children: both ok. PHYSICAL EXAM:    Vitals:  BP (!) 182/93   Pulse 99   Temp 98.6 °F (37 °C) (Oral)   Resp 14   Ht 5' 2\" (1.575 m)   Wt 253 lb 12.8 oz (115.1 kg)   LMP 1990   SpO2 94%   BMI 46.42 kg/m²     At my exam, P 96/m, normal volume, regular. General condition: Middle aged morbidly obese female lying comfortably in bed in no acute distress. Pt had been sleeping when I entered the room and awoke after multiple efforts and awoke after several tried. ,  HEENT: Atraumatic. Pupils equal, round, reactive to light. No pallor, icterus or cataract. No conjunctival injection. No nasal congestion or discharge. No pharyngeal erythema or pus points. Faucial aperture: Mallampati 4. Neck: Large. Supple. Normal RoM. No JVD, hepato-jugular reflux, lymphadenopathy, thyroid enlargement, tracheal deviation, bruit. Chest: Symmetric. Equal AE b/l with distant vesicular breath sounds and scattered . bibasilar rales  No rhonchi at this time. Heart: Distant, muffled, regular ht sounds. No murmur. No rub or gallop. Abdomen: Non-distended; very obese; soft; non-tender; no palpable organomegaly or mass; bowel sounds present and normal. No CVAT. Extremities: There is tenderness over the left leg over the shin, at the tibial promontory and posteriorly over the calf, without skin color changes or break. There is mild right leg non-specific tenderness. No clubbing, cyanosis, edema. Feet dry and warm. Skin: Warm and dry, no open lesions or rash. Neuro: Pt is awake, alert and oriented to time, place and person. Speech and cognition normal. Cranial nerves intact with no focal deficits. Motor strength 5/5 in UEs and LEs b/l. BJs and KJs non-elicitable.  Light touch sense at the toes normal b/l. Breast: deferred  Rectal: deferred  Genitalia:  Deferred      LABS:  Recent Labs     04/10/19  1610      K 6.5*      CO2 29   BUN 22   CREATININE 1.0   GLUCOSE 148*   CALCIUM 8.9   eGFR aa >60. AG 8. Recent Labs     04/10/19  1452   WBC 7.0   RBC 4.63   HGB 14.2   HCT 46.3   .0*   MCH 30.7   MCHC 30.7*   RDW 14.2      MPV 10.5   Diff: N 77.5, L 15.8, M 4, E 2, B 0.3%. Recent Labs     04/10/19  1610 04/10/19  1949   GLUCOSE 148*  --    GLUMET  --  200*      Ref. Range 4/10/2019 16:10   Troponin Latest Ref Range: 0.00 - 0.03 ng/mL <0.01      Ref. Range 4/10/2019 14:52   D-Dimer, Quant Latest Units: ng/mL       Ref. Range 4/10/2019 16:10   Albumin Latest Ref Range: 3.5 - 5.2 g/dL 3.8   Alk Phos Latest Ref Range: 35 - 104 U/L 74   ALT Latest Ref Range: 0 - 32 U/L 18   AST Latest Ref Range: 0 - 31 U/L 23   Bilirubin Latest Ref Range: 0.0 - 1.2 mg/dL 0.3   Total Protein Latest Ref Range: 6.4 - 8.3 g/dL 7.4      Ref. Range 4/10/2019 14:52   Influenza A by PCR Latest Ref Range: Not Detected  Not Detected   Influenza B by PCR Latest Ref Range: Not Detected  Not Detected      Ref. Range 4/10/2019 14:52   Magnesium Latest Ref Range: 1.6 - 2.6 mg/dL 2.1   Lactic Acid Latest Ref Range: 0.5 - 2.2 mmol/L 1.5     ABG on O2 NC 4 LPM:   Ref. Range 4/10/2019 18:32   pH, Blood Gas Latest Ref Range: 7.350 - 7.450  7.359   PCO2 Latest Ref Range: 35.0 - 45.0 mmHg 46.3 (H)   pO2 Latest Ref Range: 60.0 - 100.0 mmHg 67.2   HCO3 Latest Ref Range: 22.0 - 26.0 mmol/L 25.5   Base Excess Latest Ref Range: -3.0 - 3.0 mmol/L -0.3   O2 Sat Latest Ref Range: 92.0 - 98.5 % 92.4     UA:   Ref.  Range 4/10/2019 17:27   Color, UA Latest Ref Range: Straw/Yellow  Yellow   Clarity, UA Latest Ref Range: Clear  Clear   Glucose, UA Latest Ref Range: Negative mg/dL Negative   Bilirubin, Urine Latest Ref Range: Negative  Negative   Ketones, Urine Latest Ref Range: Negative mg/dL Negative   Specific Gravity, pulmonary inflation with bilateral perihilar and infrahilar pulmonary infiltrates. No obvious large pleural effusion, extensive pulmonary consolidation, or gross pneumothorax. Questionable partially consolidative airspace opacity at right lung base. Correlate clinically for ongoing CHF, fluid overload, etc. Cannot exclude possible coexisting right basilar pneumonia, with clinical correlation necessary in this regard also. Continued follow-up advised. EKG /10/2019 1420 hrs: Normal sinus rhythm, 91/m. Nonspecific intraventricular block. Abnormal ECG. When compared with ECG of 05-JAN-2019 01:05, Nonspecific intraventricular block has replaced Right bundle branch block      ASSESSMENT:      Principal Problem:    COPD exacerbation (HCC)  Active Problems:    Chronic respiratory failure with hypoxia and hypercapnia (HCC)    DAYAN and COPD overlap syndrome (HCC)    Chronic combined systolic and diastolic heart failure (HCC)    Essential hypertension    DM2 (diabetes mellitus, type 2) ([facility]    Morbid obesity due to excess calories ([facility]    Hyperlipidemia    Coronary artery disease involving native coronary artery of native heart without angina pectoris    Cigarette smoker  Resolved Problems:    * No resolved hospital problems. *      PLAN:    · Continue nebulized bronchodilators - DuoNeb. · IV steroid - taper off per clinical response. · Abx for now - azithromycin. Check procalcitonin and f/u temp and wbc and decide on continuation of abx. · Check urine for antigens for Strep and Legionella. · Continue LABA + steroid MDI. · Continue Singulair. · Meds for cough - Robitussin DM q4h PRN and tessalon perles q8h. · Supplemental O2 as prior, adjust per sO2 but avoid excessive FiO2. · Continue BiPAP as prior. · Consider Pulm consult based on clinical response. · Continue Flonase and oral anti-histamine as prior. · Cigarette smoker: Adv to permanently and completely quit. · Continue meds for HTN and CAD as prior. F/u BP, HR. · DM2: Carb controlled diet. Continue Lantus insulin along with Humalog SSI coverage AC / HS.  · DVT prophylaxis - Lovenox. · GI bleed prophylaxis - PPI as prior.       Please note that over 50 minutes was spent in evaluating the patient, review of records and results, discussion with staff, etc.    Electronically signed by Galen Yi Hospitalist [facility] at [facility]

## 2019-04-11 NOTE — PROGRESS NOTES
Pharmacy Note    Marixajoselyn Camarena was ordered Pataday. Per the [address], this medication is non-formulary and not stocked by pharmacy for the reason indicated below. The medication can be reordered at discharge. Thank Kimberly Pope Pharm. D 4/11/2019 2:44 AM

## 2019-04-11 NOTE — CARE COORDINATION
Met with patient. Introduced self and role. IMM letter signed. Discussed change in status from observation to inpatient, POC and aware waiting pulmonology input. Lives in one story home with laundry in basement. Two steps to enter. Has aide that comes twice daily to assist with ADL's. Uses walker or cane to ambulate. Has glucometer and nebulizer. Wears trilogy with 2-3L O2 bleed while sleeping. Plan is to return home on discharge.

## 2019-04-11 NOTE — PLAN OF CARE
Problem: Falls - Risk of:  Goal: Will remain free from falls  Description  Will remain free from falls  Outcome: Met This Shift     Problem: Falls - Risk of:  Goal: Absence of physical injury  Description  Absence of physical injury  Outcome: Met This Shift     Problem: Breathing Pattern - Ineffective:  Goal: Ability to achieve and maintain a regular respiratory rate will improve  Description  Ability to achieve and maintain a regular respiratory rate will improve  Outcome: Not Met This Shift

## 2019-04-11 NOTE — PROGRESS NOTES
Date: 4/11/2019    Time: 0200    Patient Placed On BIPAP/CPAP/ Non-Invasive Ventilation? Yes    If no must comment. Facial area red/color change? No           If YES are Blister/Lesion present? No   If yes must notify nursing staff  BIPAP/CPAP skin barrier?   Yes    Skin barrier type:Liquicel       Comments:        Rizwana Olvera RRT

## 2019-04-11 NOTE — PROGRESS NOTES
[facility] Hospitalist   Progress Note    Admitting Date and Time: 4/10/2019  2:02 PM  Admit Dx: COPD exacerbation ([facility] [J44.1]  COPD exacerbation ([facility] [J44.1]  COPD exacerbation ([facility] [J44.1]    Subjective:    Patient was admitted with COPD exacerbation ([facility] [J44.1]  COPD exacerbation ([facility] [J44.1]  COPD exacerbation ([facility] [J44.1]. Patient denies fever, chills, n/v. Pt c/o sob and cough of green pleghm and noted same green nasal drainage. Pt c/o chronic knee pain. Pt states takes tylenol arthritis for pain.       aspirin  81 mg Oral Daily    baclofen  10 mg Oral BID    bumetanide  1 mg Oral Daily    cetirizine  10 mg Oral Daily    clopidogrel  75 mg Oral Daily    clotrimazole-betamethasone   Topical BID    DULoxetine  60 mg Oral BID    fluticasone  2 spray Nasal BID    gabapentin  600 mg Oral TID    hydrALAZINE  100 mg Oral TID    hydrocortisone   Topical BID    insulin lispro  0-10 Units Subcutaneous 4x Daily AC & HS    isosorbide mononitrate  60 mg Oral Daily    metoprolol succinate  25 mg Oral BID    mometasone-formoterol  2 puff Inhalation Q12H    montelukast  10 mg Oral Nightly    therapeutic multivitamin-minerals  1 tablet Oral Daily    pantoprazole  40 mg Oral BID    QUEtiapine  25 mg Oral Nightly    rosuvastatin  5 mg Oral Daily    sacubitril-valsartan  1 tablet Oral BID    ursodiol  300 mg Oral BID    [START ON 4/14/2019] vitamin D  50,000 Units Oral Weekly    sodium chloride flush  10 mL Intravenous 2 times per day    enoxaparin  40 mg Subcutaneous Daily    methylPREDNISolone  40 mg Intravenous Q12H    azithromycin  500 mg Intravenous Q24H    benzonatate  100 mg Oral Q8H    insulin glargine  50 Units Subcutaneous BID    oxybutynin  5 mg Oral Nightly    miconazole nitrate   Topical BID    olopatadine  1 drop Both Eyes Daily    ipratropium-albuterol  1 ampule Inhalation Q4H       diphenoxylate-atropine 1 tablet 4x Daily PRN   lidocaine  PRN   sodium chloride flush 10 mL PRN   magnesium hydroxide 30 mL Daily PRN   guaiFENesin 400 mg Q4H PRN   trimethobenzamide 200 mg Q6H PRN   melatonin 1 mg Nightly PRN   acetaminophen 650 mg Q4H PRN        Objective:    /60   Pulse 76   Temp 98.6 °F (37 °C) (Oral)   Resp 18   Ht 5' 2\" (1.575 m)   Wt 253 lb 12.8 oz (115.1 kg)   LMP 01/01/1990   SpO2 94%   BMI 46.42 kg/m²       General:  Appears comfortable. Answers questions appropriately and cooperative with exam  HEENT:  Mucous membranes moist. No erythema, rhinorrhea, or post-nasal drip noted. Neck:  No carotid bruits. Heart:  Rhythm regular at rate of 78  Lungs:  Pos wheeze  Abdomen:  Positive bowel sounds positive. Soft. Non-tender. No guarding, rebound or rigidity. Breast/Rectal/Genitourinary: not pertinent. Extremities:  Negative for lower extremity edema. No acute changes to knee  Skin:  Warm and dry  Vascular: 2/4 Dorsalis Pedis pulses bilaterally. Neuro:  Cranial nerves 2-12 grossly intact, no focal weakness or change in sensation noted. Extraocular muscles intact. Pupils equal, round, reactive to light.             Recent Labs     04/10/19  1610 04/11/19  0612    138   K 6.5* 5.1*    98   CO2 29 32*   BUN 22 21   CREATININE 1.0 1.0   GLUCOSE 148* 174*   CALCIUM 8.9 9.2       Recent Labs     04/10/19  1452   WBC 7.0   RBC 4.63   HGB 14.2   HCT 46.3   .0*   MCH 30.7   MCHC 30.7*   RDW 14.2      MPV 10.5       BMP:    Lab Results   Component Value Date     04/11/2019    K 5.1 04/11/2019    K 6.5 04/10/2019    CL 98 04/11/2019    CO2 32 04/11/2019    BUN 21 04/11/2019    LABALBU 3.8 04/10/2019    LABALBU 5.2 12/01/2011    CREATININE 1.0 04/11/2019    CALCIUM 9.2 04/11/2019    GFRAA >60 04/11/2019    LABGLOM >60 04/11/2019    GLUCOSE 174 04/11/2019    GLUCOSE 181 12/01/2011        Radiology:   XR CHEST PORTABLE   Final Result   Correlate clinically for ongoing CHF, fluid overload, etc.   Cannot exclude possible coexisting right basilar pneumonia, with   clinical correlation necessary in this regard also. Continued follow-up advised. NM LUNG VENT/PERFUSION (VQ)   Final Result   Low probability for pulmonary embolism. Assessment:    Principal Problem:    COPD exacerbation (HCC)  Active Problems:    Chronic combined systolic and diastolic heart failure (HCC)    Coronary artery disease involving native coronary artery of native heart without angina pectoris    Chronic respiratory failure with hypoxia and hypercapnia (HCC)    DAYAN and COPD overlap syndrome (HCC)    Essential hypertension    DM2 (diabetes mellitus, type 2) ([facility]    Morbid obesity due to excess calories ([facility]    Cigarette smoker    Hyperlipidemia  Resolved Problems:    * No resolved hospital problems. *      Plan:  1. Acute respiratory failure with hypoxia pt noted to be off oxygen and nursing checked her and was 85%. pulm consulted wean o2 as able  2. Coronavirus URI will defer abx for superimposed bacterial infection to pulm  3. Copd exac iv steroids and nebs. pulm following. Case d/w pulm  4. knee pain start tylenol  5. Dm type 2 controlled. Monitor bs and tx  6. htn continue med  7.  Hyperlipidemia continue med    Chart reviewed and updated by nursing    Time spent is 35 min      Electronically signed by Radha Patel DO on 4/11/2019 at 2:37 PM

## 2019-04-12 LAB
ANION GAP SERPL CALCULATED.3IONS-SCNC: 10 MMOL/L (ref 7–16)
ANION GAP SERPL CALCULATED.3IONS-SCNC: 8 MMOL/L (ref 7–16)
ANION GAP SERPL CALCULATED.3IONS-SCNC: 8 MMOL/L (ref 7–16)
BUN BLDV-MCNC: 29 MG/DL (ref 8–23)
BUN BLDV-MCNC: 30 MG/DL (ref 8–23)
BUN BLDV-MCNC: 31 MG/DL (ref 8–23)
CALCIUM SERPL-MCNC: 9.1 MG/DL (ref 8.6–10.2)
CALCIUM SERPL-MCNC: 9.2 MG/DL (ref 8.6–10.2)
CALCIUM SERPL-MCNC: 9.3 MG/DL (ref 8.6–10.2)
CHLORIDE BLD-SCNC: 94 MMOL/L (ref 98–107)
CHLORIDE BLD-SCNC: 96 MMOL/L (ref 98–107)
CHLORIDE BLD-SCNC: 97 MMOL/L (ref 98–107)
CO2: 28 MMOL/L (ref 22–29)
CO2: 30 MMOL/L (ref 22–29)
CO2: 32 MMOL/L (ref 22–29)
CREAT SERPL-MCNC: 1.1 MG/DL (ref 0.5–1)
CREAT SERPL-MCNC: 1.2 MG/DL (ref 0.5–1)
CREAT SERPL-MCNC: 1.2 MG/DL (ref 0.5–1)
GFR AFRICAN AMERICAN: 55
GFR AFRICAN AMERICAN: 55
GFR AFRICAN AMERICAN: >60
GFR NON-AFRICAN AMERICAN: 55 ML/MIN/1.73
GFR NON-AFRICAN AMERICAN: 55 ML/MIN/1.73
GFR NON-AFRICAN AMERICAN: >60 ML/MIN/1.73
GLUCOSE BLD-MCNC: 155 MG/DL (ref 74–99)
GLUCOSE BLD-MCNC: 161 MG/DL (ref 74–99)
GLUCOSE BLD-MCNC: 167 MG/DL (ref 74–99)
METER GLUCOSE: 115 MG/DL (ref 74–99)
METER GLUCOSE: 121 MG/DL (ref 74–99)
METER GLUCOSE: 143 MG/DL (ref 74–99)
METER GLUCOSE: 151 MG/DL (ref 74–99)
METER GLUCOSE: 164 MG/DL (ref 74–99)
METER GLUCOSE: 188 MG/DL (ref 74–99)
METER GLUCOSE: 201 MG/DL (ref 74–99)
POTASSIUM SERPL-SCNC: 5.4 MMOL/L (ref 3.5–5)
POTASSIUM SERPL-SCNC: 5.6 MMOL/L (ref 3.5–5)
POTASSIUM SERPL-SCNC: 5.9 MMOL/L (ref 3.5–5)
SODIUM BLD-SCNC: 132 MMOL/L (ref 132–146)
SODIUM BLD-SCNC: 134 MMOL/L (ref 132–146)
SODIUM BLD-SCNC: 137 MMOL/L (ref 132–146)

## 2019-04-12 PROCEDURE — 6370000000 HC RX 637 (ALT 250 FOR IP): Performed by: INTERNAL MEDICINE

## 2019-04-12 PROCEDURE — 94660 CPAP INITIATION&MGMT: CPT

## 2019-04-12 PROCEDURE — 6360000002 HC RX W HCPCS: Performed by: INTERNAL MEDICINE

## 2019-04-12 PROCEDURE — 82962 GLUCOSE BLOOD TEST: CPT

## 2019-04-12 PROCEDURE — 80048 BASIC METABOLIC PNL TOTAL CA: CPT

## 2019-04-12 PROCEDURE — 2700000000 HC OXYGEN THERAPY PER DAY

## 2019-04-12 PROCEDURE — 94640 AIRWAY INHALATION TREATMENT: CPT

## 2019-04-12 PROCEDURE — 2580000003 HC RX 258: Performed by: INTERNAL MEDICINE

## 2019-04-12 PROCEDURE — APPSS30 APP SPLIT SHARED TIME 16-30 MINUTES: Performed by: NURSE PRACTITIONER

## 2019-04-12 PROCEDURE — 36415 COLL VENOUS BLD VENIPUNCTURE: CPT

## 2019-04-12 PROCEDURE — 1200000000 HC SEMI PRIVATE

## 2019-04-12 PROCEDURE — 99233 SBSQ HOSP IP/OBS HIGH 50: CPT | Performed by: INTERNAL MEDICINE

## 2019-04-12 PROCEDURE — 6370000000 HC RX 637 (ALT 250 FOR IP): Performed by: NURSE PRACTITIONER

## 2019-04-12 RX ORDER — HYDROCODONE BITARTRATE AND ACETAMINOPHEN 5; 325 MG/1; MG/1
1 TABLET ORAL EVERY 6 HOURS PRN
Status: DISCONTINUED | OUTPATIENT
Start: 2019-04-12 | End: 2019-04-18 | Stop reason: HOSPADM

## 2019-04-12 RX ORDER — AZITHROMYCIN 250 MG/1
250 TABLET, FILM COATED ORAL DAILY
Status: DISCONTINUED | OUTPATIENT
Start: 2019-04-12 | End: 2019-04-16

## 2019-04-12 RX ORDER — HYDROCODONE BITARTRATE AND ACETAMINOPHEN 5; 325 MG/1; MG/1
1 TABLET ORAL ONCE
Status: COMPLETED | OUTPATIENT
Start: 2019-04-12 | End: 2019-04-12

## 2019-04-12 RX ORDER — METHYLPREDNISOLONE SODIUM SUCCINATE 40 MG/ML
40 INJECTION, POWDER, LYOPHILIZED, FOR SOLUTION INTRAMUSCULAR; INTRAVENOUS DAILY
Status: DISCONTINUED | OUTPATIENT
Start: 2019-04-13 | End: 2019-04-16

## 2019-04-12 RX ADMIN — DULOXETINE HYDROCHLORIDE 60 MG: 60 CAPSULE, DELAYED RELEASE ORAL at 08:50

## 2019-04-12 RX ADMIN — ACETAMINOPHEN 650 MG: 325 TABLET ORAL at 12:12

## 2019-04-12 RX ADMIN — IPRATROPIUM BROMIDE AND ALBUTEROL SULFATE 1 AMPULE: .5; 3 SOLUTION RESPIRATORY (INHALATION) at 09:25

## 2019-04-12 RX ADMIN — BACLOFEN 10 MG: 10 TABLET ORAL at 08:48

## 2019-04-12 RX ADMIN — LIDOCAINE: 50 OINTMENT TOPICAL at 06:28

## 2019-04-12 RX ADMIN — BACLOFEN 10 MG: 10 TABLET ORAL at 20:28

## 2019-04-12 RX ADMIN — Medication 1 TABLET: at 08:52

## 2019-04-12 RX ADMIN — CLOTRIMAZOLE AND BETAMETHASONE DIPROPIONATE: 10; .5 CREAM TOPICAL at 08:58

## 2019-04-12 RX ADMIN — IPRATROPIUM BROMIDE AND ALBUTEROL SULFATE 1 AMPULE: .5; 3 SOLUTION RESPIRATORY (INHALATION) at 04:09

## 2019-04-12 RX ADMIN — IPRATROPIUM BROMIDE AND ALBUTEROL SULFATE 1 AMPULE: .5; 3 SOLUTION RESPIRATORY (INHALATION) at 17:21

## 2019-04-12 RX ADMIN — CETIRIZINE HYDROCHLORIDE 10 MG: 10 TABLET, FILM COATED ORAL at 08:49

## 2019-04-12 RX ADMIN — HYDROCORTISONE: 25 CREAM TOPICAL at 20:29

## 2019-04-12 RX ADMIN — SACUBITRIL AND VALSARTAN 1 TABLET: 24; 26 TABLET, FILM COATED ORAL at 08:53

## 2019-04-12 RX ADMIN — INSULIN GLARGINE 50 UNITS: 100 INJECTION, SOLUTION SUBCUTANEOUS at 20:32

## 2019-04-12 RX ADMIN — GABAPENTIN 600 MG: 300 CAPSULE ORAL at 08:51

## 2019-04-12 RX ADMIN — INSULIN LISPRO 2 UNITS: 100 INJECTION, SOLUTION INTRAVENOUS; SUBCUTANEOUS at 06:31

## 2019-04-12 RX ADMIN — ISOSORBIDE MONONITRATE 60 MG: 60 TABLET, EXTENDED RELEASE ORAL at 08:52

## 2019-04-12 RX ADMIN — BENZONATATE 100 MG: 100 CAPSULE ORAL at 20:30

## 2019-04-12 RX ADMIN — INSULIN LISPRO 2 UNITS: 100 INJECTION, SOLUTION INTRAVENOUS; SUBCUTANEOUS at 12:09

## 2019-04-12 RX ADMIN — MOMETASONE FUROATE AND FORMOTEROL FUMARATE DIHYDRATE 2 PUFF: 200; 5 AEROSOL RESPIRATORY (INHALATION) at 21:41

## 2019-04-12 RX ADMIN — SACUBITRIL AND VALSARTAN 1 TABLET: 24; 26 TABLET, FILM COATED ORAL at 20:29

## 2019-04-12 RX ADMIN — MICONAZOLE NITRATE: 2 OINTMENT TOPICAL at 20:29

## 2019-04-12 RX ADMIN — BENZONATATE 100 MG: 100 CAPSULE ORAL at 12:11

## 2019-04-12 RX ADMIN — MONTELUKAST SODIUM 10 MG: 10 TABLET, FILM COATED ORAL at 20:28

## 2019-04-12 RX ADMIN — PANTOPRAZOLE SODIUM 40 MG: 40 TABLET, DELAYED RELEASE ORAL at 06:27

## 2019-04-12 RX ADMIN — METOPROLOL SUCCINATE 25 MG: 25 TABLET, EXTENDED RELEASE ORAL at 08:52

## 2019-04-12 RX ADMIN — HYDRALAZINE HYDROCHLORIDE 100 MG: 50 TABLET, FILM COATED ORAL at 08:52

## 2019-04-12 RX ADMIN — Medication 10 ML: at 08:53

## 2019-04-12 RX ADMIN — IPRATROPIUM BROMIDE AND ALBUTEROL SULFATE 1 AMPULE: .5; 3 SOLUTION RESPIRATORY (INHALATION) at 21:41

## 2019-04-12 RX ADMIN — GABAPENTIN 600 MG: 300 CAPSULE ORAL at 20:30

## 2019-04-12 RX ADMIN — ROSUVASTATIN CALCIUM 5 MG: 5 TABLET, FILM COATED ORAL at 08:52

## 2019-04-12 RX ADMIN — HYDROCODONE BITARTRATE AND ACETAMINOPHEN 1 TABLET: 5; 325 TABLET ORAL at 14:30

## 2019-04-12 RX ADMIN — AZITHROMYCIN 250 MG: 250 TABLET, FILM COATED ORAL at 18:09

## 2019-04-12 RX ADMIN — URSODIOL 300 MG: 300 CAPSULE ORAL at 08:52

## 2019-04-12 RX ADMIN — PANTOPRAZOLE SODIUM 40 MG: 40 TABLET, DELAYED RELEASE ORAL at 16:37

## 2019-04-12 RX ADMIN — URSODIOL 300 MG: 300 CAPSULE ORAL at 20:29

## 2019-04-12 RX ADMIN — FLUTICASONE PROPIONATE 2 SPRAY: 50 SPRAY, METERED NASAL at 20:29

## 2019-04-12 RX ADMIN — BENZONATATE 100 MG: 100 CAPSULE ORAL at 06:27

## 2019-04-12 RX ADMIN — ENOXAPARIN SODIUM 40 MG: 40 INJECTION SUBCUTANEOUS at 08:53

## 2019-04-12 RX ADMIN — ACETAMINOPHEN 650 MG: 325 TABLET ORAL at 20:40

## 2019-04-12 RX ADMIN — METHYLPREDNISOLONE SODIUM SUCCINATE 40 MG: 40 INJECTION, POWDER, LYOPHILIZED, FOR SOLUTION INTRAMUSCULAR; INTRAVENOUS at 06:27

## 2019-04-12 RX ADMIN — HYDROCORTISONE: 25 CREAM TOPICAL at 09:02

## 2019-04-12 RX ADMIN — MICONAZOLE NITRATE: 2 OINTMENT TOPICAL at 08:59

## 2019-04-12 RX ADMIN — INSULIN GLARGINE 50 UNITS: 100 INJECTION, SOLUTION SUBCUTANEOUS at 09:03

## 2019-04-12 RX ADMIN — INSULIN LISPRO 4 UNITS: 100 INJECTION, SOLUTION INTRAVENOUS; SUBCUTANEOUS at 20:33

## 2019-04-12 RX ADMIN — CLOPIDOGREL BISULFATE 75 MG: 75 TABLET ORAL at 08:50

## 2019-04-12 RX ADMIN — DULOXETINE HYDROCHLORIDE 60 MG: 60 CAPSULE, DELAYED RELEASE ORAL at 20:28

## 2019-04-12 RX ADMIN — OXYBUTYNIN CHLORIDE 5 MG: 5 TABLET, EXTENDED RELEASE ORAL at 20:28

## 2019-04-12 RX ADMIN — ASPIRIN 81 MG 81 MG: 81 TABLET ORAL at 08:48

## 2019-04-12 RX ADMIN — METOPROLOL SUCCINATE 25 MG: 25 TABLET, EXTENDED RELEASE ORAL at 20:29

## 2019-04-12 RX ADMIN — IPRATROPIUM BROMIDE AND ALBUTEROL SULFATE 1 AMPULE: .5; 3 SOLUTION RESPIRATORY (INHALATION) at 12:48

## 2019-04-12 RX ADMIN — GABAPENTIN 600 MG: 300 CAPSULE ORAL at 14:30

## 2019-04-12 RX ADMIN — ACETAMINOPHEN 650 MG: 325 TABLET ORAL at 02:55

## 2019-04-12 RX ADMIN — HYDRALAZINE HYDROCHLORIDE 100 MG: 50 TABLET, FILM COATED ORAL at 14:30

## 2019-04-12 RX ADMIN — MOMETASONE FUROATE AND FORMOTEROL FUMARATE DIHYDRATE 2 PUFF: 200; 5 AEROSOL RESPIRATORY (INHALATION) at 09:25

## 2019-04-12 RX ADMIN — Medication 10 ML: at 20:41

## 2019-04-12 RX ADMIN — FLUTICASONE PROPIONATE 2 SPRAY: 50 SPRAY, METERED NASAL at 08:58

## 2019-04-12 RX ADMIN — OLOPATADINE HYDROCHLORIDE 1 DROP: 2 SOLUTION OPHTHALMIC at 09:04

## 2019-04-12 RX ADMIN — QUETIAPINE FUMARATE 25 MG: 25 TABLET ORAL at 20:28

## 2019-04-12 RX ADMIN — BUMETANIDE 1 MG: 1 TABLET ORAL at 06:27

## 2019-04-12 RX ADMIN — HYDRALAZINE HYDROCHLORIDE 100 MG: 50 TABLET, FILM COATED ORAL at 20:28

## 2019-04-12 ASSESSMENT — PAIN DESCRIPTION - ONSET
ONSET: ON-GOING

## 2019-04-12 ASSESSMENT — PAIN SCALES - GENERAL
PAINLEVEL_OUTOF10: 10
PAINLEVEL_OUTOF10: 0
PAINLEVEL_OUTOF10: 10
PAINLEVEL_OUTOF10: 0
PAINLEVEL_OUTOF10: 5
PAINLEVEL_OUTOF10: 10
PAINLEVEL_OUTOF10: 0
PAINLEVEL_OUTOF10: 10

## 2019-04-12 ASSESSMENT — PAIN DESCRIPTION - FREQUENCY
FREQUENCY: CONTINUOUS

## 2019-04-12 ASSESSMENT — PAIN DESCRIPTION - LOCATION
LOCATION: BACK
LOCATION: BACK;LEG
LOCATION: HEAD
LOCATION: BACK;LEG

## 2019-04-12 ASSESSMENT — PAIN DESCRIPTION - PROGRESSION
CLINICAL_PROGRESSION: NOT CHANGED
CLINICAL_PROGRESSION: GRADUALLY WORSENING
CLINICAL_PROGRESSION: NOT CHANGED
CLINICAL_PROGRESSION: NOT CHANGED

## 2019-04-12 ASSESSMENT — PAIN DESCRIPTION - ORIENTATION
ORIENTATION: RIGHT;LEFT
ORIENTATION: RIGHT
ORIENTATION: RIGHT;LEFT

## 2019-04-12 ASSESSMENT — PAIN - FUNCTIONAL ASSESSMENT
PAIN_FUNCTIONAL_ASSESSMENT: PREVENTS OR INTERFERES WITH MANY ACTIVE NOT PASSIVE ACTIVITIES
PAIN_FUNCTIONAL_ASSESSMENT: ACTIVITIES ARE NOT PREVENTED
PAIN_FUNCTIONAL_ASSESSMENT: PREVENTS OR INTERFERES WITH ALL ACTIVE AND SOME PASSIVE ACTIVITIES

## 2019-04-12 ASSESSMENT — PAIN DESCRIPTION - PAIN TYPE
TYPE: CHRONIC PAIN
TYPE: ACUTE PAIN
TYPE: CHRONIC PAIN

## 2019-04-12 ASSESSMENT — PAIN DESCRIPTION - DESCRIPTORS
DESCRIPTORS: ACHING;SORE
DESCRIPTORS: ACHING

## 2019-04-12 NOTE — PROGRESS NOTES
Still dyspneic going to bathroom but slightly better. +Cough.   Vent settings:   Additional Respiratory  Assessments  Pulse: 62  Resp: 18  SpO2: 98 %      Current Facility-Administered Medications:     HYDROcodone-acetaminophen (NORCO) 5-325 MG per tablet 1 tablet, 1 tablet, Oral, Q6H PRN, PATRIZIA Bai - CNP    aspirin chewable tablet 81 mg, 81 mg, Oral, Daily, Mahendra Solis MD, 81 mg at 04/12/19 0848    baclofen (LIORESAL) tablet 10 mg, 10 mg, Oral, BID, Mahendra Solis MD, 10 mg at 04/12/19 0848    bumetanide (BUMEX) tablet 1 mg, 1 mg, Oral, Daily, Mahendra Solis MD, 1 mg at 04/12/19 9826    cetirizine (ZYRTEC) tablet 10 mg, 10 mg, Oral, Daily, Mahendra Solis MD, 10 mg at 04/12/19 0849    clopidogrel (PLAVIX) tablet 75 mg, 75 mg, Oral, Daily, Mahendra Solis MD, 75 mg at 04/12/19 0850    clotrimazole-betamethasone (LOTRISONE) cream, , Topical, BID, Mahendra Solis MD    diphenoxylate-atropine (LOMOTIL) 2.5-0.025 MG per tablet 1 tablet, 1 tablet, Oral, 4x Daily PRN, Mahendra Solis MD    DULoxetine (CYMBALTA) extended release capsule 60 mg, 60 mg, Oral, BID, Mahendra Solis MD, 60 mg at 04/12/19 0850    fluticasone (FLONASE) 50 MCG/ACT nasal spray 2 spray, 2 spray, Nasal, BID, Mahendra Solis MD, 2 spray at 04/12/19 0858    gabapentin (NEURONTIN) capsule 600 mg, 600 mg, Oral, TID, Mahendra Solis MD, 600 mg at 04/12/19 1430    hydrALAZINE (APRESOLINE) tablet 100 mg, 100 mg, Oral, TID, Mahendra Solis MD, 100 mg at 04/12/19 1430    hydrocortisone 2.5 % cream, , Topical, BID, Mahendra Solis MD    insulin lispro (HUMALOG) injection vial 0-10 Units, 0-10 Units, Subcutaneous, 4x Daily AC & HS, Mahendra Solis MD, 2 Units at 04/12/19 1209    isosorbide mononitrate (IMDUR) extended release tablet 60 mg, 60 mg, Oral, Daily, Mahendra Solis MD, 60 mg at 04/12/19 0852    lidocaine (XYLOCAINE) 5 % ointment, , Topical, PRN, Margaret Koo MD    metoprolol succinate (TOPROL XL) extended release tablet 25 mg, 25 mg, Oral, BID, Margaret Koo MD, 25 mg at 04/12/19 0852    mometasone-formoterol (DULERA) 200-5 MCG/ACT inhaler 2 puff, 2 puff, Inhalation, Q12H, Margaret Koo MD, 2 puff at 04/12/19 0925    montelukast (SINGULAIR) tablet 10 mg, 10 mg, Oral, Nightly, Margaret Koo MD, 10 mg at 04/11/19 2013    therapeutic multivitamin-minerals 1 tablet, 1 tablet, Oral, Daily, Margaret Koo MD, 1 tablet at 04/12/19 0852    pantoprazole (PROTONIX) tablet 40 mg, 40 mg, Oral, BID, Margaret Koo MD, 40 mg at 04/12/19 1637    QUEtiapine (SEROQUEL) tablet 25 mg, 25 mg, Oral, Nightly, Margaret Koo MD, 25 mg at 04/11/19 2013    rosuvastatin (CRESTOR) tablet 5 mg, 5 mg, Oral, Daily, Margaret Koo MD, 5 mg at 04/12/19 0852    sacubitril-valsartan (ENTRESTO) 24-26 MG per tablet 1 tablet, 1 tablet, Oral, BID, Margaret Koo MD, 1 tablet at 04/12/19 0853    ursodiol (ACTIGALL) capsule 300 mg, 300 mg, Oral, BID, Margaret Koo MD, 300 mg at 04/12/19 0852    [START ON 4/14/2019] vitamin D (ERGOCALCIFEROL) capsule 50,000 Units, 50,000 Units, Oral, Weekly, Margaret Koo MD    sodium chloride flush 0.9 % injection 10 mL, 10 mL, Intravenous, 2 times per day, Margaret Koo MD, 10 mL at 04/12/19 0853    sodium chloride flush 0.9 % injection 10 mL, 10 mL, Intravenous, PRN, Margaret Koo MD, 10 mL at 04/11/19 1808    magnesium hydroxide (MILK OF MAGNESIA) 400 MG/5ML suspension 30 mL, 30 mL, Oral, Daily PRN, Margaret Koo MD    enoxaparin (LOVENOX) injection 40 mg, 40 mg, Subcutaneous, Daily, Margaret Koo MD, 40 mg at 04/12/19 0853    methylPREDNISolone sodium (SOLU-MEDROL) injection 40 mg, 40 mg, Intravenous, Q12H, Margaret Koo MD, 40 mg at 04/12/19 0627    azithromycin (ZITHROMAX) 500 mg in D5W 250ml addavial, 500 mg, Intravenous, Q24H, Shanel Waddell MD, Stopped at 04/11/19 1936    benzonatate (TESSALON) capsule 100 mg, 100 mg, Oral, Q8H, Shanel Waddell MD, 100 mg at 04/12/19 1211    guaiFENesin tablet 400 mg, 400 mg, Oral, Q4H PRN, Sahnel Waddell MD, 400 mg at 04/11/19 0535    insulin glargine (LANTUS) injection vial 50 Units, 50 Units, Subcutaneous, BID, Shanel Waddell MD, 50 Units at 04/12/19 2357    oxybutynin (DITROPAN-XL) extended release tablet 5 mg, 5 mg, Oral, Nightly, Shanel Waddell MD, 5 mg at 04/11/19 2013    trimethobenzamide (TIGAN) injection 200 mg, 200 mg, Intramuscular, Q6H PRN, Shanel Waddell MD    miconazole nitrate 2 % ointment, , Topical, BID, Shanel Waddell MD    olopatadine (PATADAY) 0.2 % ophthalmic solution 1 drop, 1 drop, Both Eyes, Daily, Olman Hric, DO, 1 drop at 04/12/19 0904    ipratropium-albuterol (DUONEB) nebulizer solution 1 ampule, 1 ampule, Inhalation, Q4H, Olman Hric, DO, 1 ampule at 04/12/19 1248    melatonin tablet 1 mg, 1 mg, Oral, Nightly PRN, Olman Hric, DO    acetaminophen (TYLENOL) tablet 650 mg, 650 mg, Oral, Q4H PRN, Olman Hric, DO, 650 mg at 04/12/19 1212    glucose (GLUTOSE) 40 % oral gel 15 g, 15 g, Oral, PRN, Olman Hric, DO    dextrose 50 % solution 12.5 g, 12.5 g, Intravenous, PRN, Olman Hric, DO    glucagon (rDNA) injection 1 mg, 1 mg, Intramuscular, PRN, Olman Hric, DO    dextrose 5 % solution, 100 mL/hr, Intravenous, PRN, Olman Hric, DO  /68   Pulse 62   Temp 98 °F (36.7 °C) (Oral)   Resp 18   Ht 5' 2\" (1.575 m)   Wt 249 lb 4.8 oz (113.1 kg)   LMP 01/01/1990   SpO2 98%   BMI 45.60 kg/m²     General: Morbidly obese, no distress, awake and alert  Chest: Symmetric, no accessory use  Heart: RRR  Lungs: Diminished but no wheezes today  Abdomen: Soft, nt  Extremities: No edema    Intake/Output Summary (Last 24 hours) at 4/12/2019 1651  Last data filed at 4/12/2019 1248  Gross per 24 hour   Intake --   Output 275 ml Net -275 ml                IMPRESSION:   Patient Active Problem List   Diagnosis    Morbid obesity due to excess calories (HCC)    Hyperlipidemia    DAYAN and COPD overlap syndrome (HCC)    Vitamin D insufficiency    Chronic combined systolic and diastolic heart failure (HCC)    Essential hypertension    GERD (gastroesophageal reflux disease)    Major depressive disorder, recurrent episode, mild ([facility]    Diabetic polyneuropathy associated with type 2 diabetes mellitus (HCC)    Chronic passive hepatic congestion    Mixed incontinence urge and stress    Chronic back pain - d/t muscle spasm    Glaucoma, open angle    Elevated CA 19-9 level    Lumbar stenosis    Spondylosis 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    Lumbar disc herniation    Asymmetric septal hypertrophy (HCC)    Non-compliance    Hiatal hernia    Melanosis coli    Coronary artery disease involving native coronary artery of native heart without angina pectoris    DM2 (diabetes mellitus, type 2) ([facility]    Cigarette smoker    Marijuana use, smoked    Ischemic cardiomyopathy    PVD (peripheral vascular disease) ([facility]    Chronic venous insufficiency    History of non-ST elevation myocardial infarction (NSTEMI)    QT prolongation    Atherosclerosis of native arteries of extremities with rest pain, left leg (HCC)    Cerebellar infarct (HCC)    COPD exacerbation (HCC)    Chronic respiratory failure with hypoxia and hypercapnia (HCC)    Acute respiratory failure with hypoxia (HCC)    Upper respiratory tract infection    Controlled type 2 diabetes mellitus without complication (HCC)    Coronavirus infection   Acute exac. Of asthma/ copd slightly better, wean steroids slightly. DAYAN on bipap. Doubt bacterial component to uri, finish azithromycin.     Jason Reeves  4/12/2019  4:51 PM

## 2019-04-12 NOTE — PROGRESS NOTES
Spoke with Dr. Chay Robertson to notify him of K of 5.9 on evening labs. Will repeat at 2100 as ordered.

## 2019-04-12 NOTE — PROGRESS NOTES
[facility] Progress Note    Admitting Date and Time: 4/10/2019  2:02 PM  Admit Dx: COPD exacerbation ([facility] [address] [J44.1]  COPD exacerbation ([facility] [J44.1]  COPD exacerbation ([facility] [J44.1]    Subjective:  Patient is being followed for COPD exacerbation ([facility] [address] [J44.1]  COPD exacerbation ([facility] [J44.1]  COPD exacerbation ([facility] [J44.1]   Pt sitting on the side of bed with complaints of back and leg pain. Patient states that she suffers from chronic back pain from a pinched nerve in her back. Patient is dyspneic with conversation. ROS: denies fever, chills, cp, sob, n/v, HA unless stated above.       aspirin  81 mg Oral Daily    baclofen  10 mg Oral BID    bumetanide  1 mg Oral Daily    cetirizine  10 mg Oral Daily    clopidogrel  75 mg Oral Daily    clotrimazole-betamethasone   Topical BID    DULoxetine  60 mg Oral BID    fluticasone  2 spray Nasal BID    gabapentin  600 mg Oral TID    hydrALAZINE  100 mg Oral TID    hydrocortisone   Topical BID    insulin lispro  0-10 Units Subcutaneous 4x Daily AC & HS    isosorbide mononitrate  60 mg Oral Daily    metoprolol succinate  25 mg Oral BID    mometasone-formoterol  2 puff Inhalation Q12H    montelukast  10 mg Oral Nightly    therapeutic multivitamin-minerals  1 tablet Oral Daily    pantoprazole  40 mg Oral BID    QUEtiapine  25 mg Oral Nightly    rosuvastatin  5 mg Oral Daily    sacubitril-valsartan  1 tablet Oral BID    ursodiol  300 mg Oral BID    [START ON 4/14/2019] vitamin D  50,000 Units Oral Weekly    sodium chloride flush  10 mL Intravenous 2 times per day    enoxaparin  40 mg Subcutaneous Daily    methylPREDNISolone  40 mg Intravenous Q12H    azithromycin  500 mg Intravenous Q24H    benzonatate  100 mg Oral Q8H    insulin glargine  50 Units Subcutaneous BID    oxybutynin  5 mg Oral Nightly    miconazole nitrate   Topical BID    olopatadine  1 drop Both Eyes Daily    ipratropium-albuterol  1 ampule Inhalation Q4H diphenoxylate-atropine 1 tablet 4x Daily PRN   lidocaine  PRN   sodium chloride flush 10 mL PRN   magnesium hydroxide 30 mL Daily PRN   guaiFENesin 400 mg Q4H PRN   trimethobenzamide 200 mg Q6H PRN   melatonin 1 mg Nightly PRN   acetaminophen 650 mg Q4H PRN   glucose 15 g PRN   dextrose 12.5 g PRN   glucagon (rDNA) 1 mg PRN   dextrose 100 mL/hr PRN        Objective:    BP (!) 144/88   Pulse 78   Temp 98.5 °F (36.9 °C) (Oral)   Resp 18   Ht 5' 2\" (1.575 m)   Wt 249 lb 4.8 oz (113.1 kg)   LMP 01/01/1990   SpO2 94%   BMI 45.60 kg/m²      General Appearance: alert and oriented to person, place and time and in no acute distress, obese  Skin: warm and dry  Head: normocephalic and atraumatic  Eyes: pupils equal, round, and reactive to light, extraocular eye movements intact, conjunctivae normal  Neck: neck supple and non tender without mass   Pulmonary/Chest: diminished with wheezes and moist non productive cough noted, normal air movement, no respiratory distress, O2 4L NC intact  Cardiovascular: normal rate, normal S1 and S2 and no carotid bruits  Abdomen: soft, non-tender, non-distended, normal bowel sounds, no masses or organomegaly  Extremities: no cyanosis, no clubbing and no edema  Neurologic: no cranial nerve deficit and speech normal        Recent Labs     04/10/19  1610 04/11/19  0612 04/12/19  0610    138 137   K 6.5* 5.1* 5.6*    98 97*   CO2 29 32* 32*   BUN 22 21 29*   CREATININE 1.0 1.0 1.2*   GLUCOSE 148* 174* 167*   CALCIUM 8.9 9.2 9.2       Recent Labs     04/10/19  1452   WBC 7.0   RBC 4.63   HGB 14.2   HCT 46.3   .0*   MCH 30.7   MCHC 30.7*   RDW 14.2      MPV 10.5       Assessment:    Principal Problem:    COPD exacerbation (HCC)  Active Problems:    Chronic combined systolic and diastolic heart failure (HCC)    Coronary artery disease involving native coronary artery of native heart without angina pectoris    Chronic respiratory failure with hypoxia and hypercapnia (HCC)    DAYAN and COPD overlap syndrome ([facility] [address]    Essential hypertension    DM2 (diabetes mellitus, type 2) ([facility]    Morbid obesity due to excess calories ([facility]    Cigarette smoker    Hyperlipidemia    Acute respiratory failure with hypoxia ([facility]    Upper respiratory tract infection    Controlled type 2 diabetes mellitus without complication ([facility]    Coronavirus infection  Resolved Problems:    * No resolved hospital problems. *      Plan:  1. Exacerbation of COPD - continue IV steroids, breathing treatment and Singulair - O2 intact - no acute distress - wheezes and moist cough - IV Zithromax antibiotic  2. Chronic combined Systolic and Diastolic Heart Failure - continue Aspirin and Bumex as ordered    3. Chronic Respiratory Failure with Hypoxia and Hypercapnia - supplemental oxygen - pulmonary following   4. Essential Hypertension - stable -  will continue current regime   5. Type II Diabetes Mellitus - on basal insulin with sliding scale - will monitor BG  6. Hyperlipidemia - continue current treatment with Crestor   7. Coronavirus Infection - conservative therapy continued   8. Acute Kidney Injury - may be d/t diuretics - will monitor renal function   9. Hyperkalemia - no arrhythmias noted - will monitor K+   10. Tobacco Abuse -   about avoid being around anyone smoking as this may exacerbate her disease      NOTE: This report was transcribed using voice recognition software. Every effort was made to ensure accuracy; however, inadvertent computerized transcription errors may be present.     Electronically signed by PATRIZAI Underwood CNP on 4/12/2019 at 10:39 AM

## 2019-04-12 NOTE — PROGRESS NOTES
Date: 4/11/2019    Time: 11:50 PM    Patient Placed On BIPAP/CPAP/ Non-Invasive Ventilation? No    If no must comment. Facial area red/color change? No           If YES are Blister/Lesion present? No   If yes must notify nursing staff  BIPAP/CPAP skin barrier?   Yes    Skin barrier type:Liquicel       Comments:        Teodoro Graves

## 2019-04-12 NOTE — CONSULTS
[address] [address]                                  CONSULTATION    PATIENT NAME: Mikhail Rodarte                    :        1955  MED REC NO:   [id number]                            ROOM:       0413  ACCOUNT NO:   [de-identified]                           ADMIT DATE: 04/10/2019  PROVIDER:     Carmine Santos MD    CONSULT DATE:  2019    PULMONARY CONSULTATION    REASON FOR CONSULTATION:  Requested by Dr. Wilfrido Bui for COPD/asthma  exacerbation. IMPRESSION:  1. Mild hemoptysis, likely due to viral prodromal illness. 2.  Acute exacerbation of asthma with positive bronchoprovocation test  done several years ago in the past.  3.  Coronavirus infection likely is a trigger to this upper respiratory  infection. 4.  Obstructive sleep apnea, on BiPAP . PLAN:  Plan at this point is that she as actively wheezing, to continue  the Solu-Medrol 40 IV q. 12, continuing [place] in place of her  maintenance inhaler, and continuing DuoNeb for now. We will continue  her on BiPAP as is. HISTORY OF PRESENT ILLNESS:  A 69-year-old -American female who  is well known to me with asthma and sleep apnea developed a fever to 103  about a week ago leading to cough with small amounts of blood and green  sputum and she coughed up blood three times. She also was short of  breath and wheezing, so she came into the ED. There was also a chest  heaviness, but that she felt was different than her \"heart attack\" when  she needed her emergent open heart surgery. She was worked up for  pulmonary embolism, which I do not think she has. The V/Q scan although  read as some segmental and subsegmental perfusion defects was described  as low probability, which I agree with. She had some leg swelling  as well. Chest x-ray showed some congestive changes.     PAST MEDICAL HISTORY:  Besides for asthma and sleep apnea 20:01:12     [email or web address]  Job#: [id number]     Doc#: [id number]    CC:

## 2019-04-12 NOTE — PROGRESS NOTES
P Quality Flow/Interdisciplinary Rounds Progress Note        Quality Flow Rounds held on April 12, 2019    Disciplines Attending:  Bedside Nurse, ,  and Nursing Unit Leadership    Korene Angelucci was admitted on 4/10/2019  2:02 PM    Anticipated Discharge Date:  Expected Discharge Date: 04/13/19    Disposition:    Jadon Score:  Jadon Scale Score: 19    Readmission Risk              Risk of Unplanned Readmission:        59           Discussed patient goal for the day, patient clinical progression, and barriers to discharge.   The following Goal(s) of the Day/Commitment(s) have been identified:  Other  check GF      Jake Garcia  April 12, 2019

## 2019-04-12 NOTE — PLAN OF CARE
Problem: Falls - Risk of:  Goal: Will remain free from falls  Description  Will remain free from falls  4/12/2019 0106 by Sami Isbell RN  Outcome: Met This Shift     Problem: Breathing Pattern - Ineffective:  Goal: Ability to achieve and maintain a regular respiratory rate will improve  Description  Ability to achieve and maintain a regular respiratory rate will improve  4/12/2019 0106 by Sami Isbell RN  Outcome: Met This Shift

## 2019-04-13 LAB
ANION GAP SERPL CALCULATED.3IONS-SCNC: 10 MMOL/L (ref 7–16)
BUN BLDV-MCNC: 32 MG/DL (ref 8–23)
CALCIUM SERPL-MCNC: 8.9 MG/DL (ref 8.6–10.2)
CHLORIDE BLD-SCNC: 96 MMOL/L (ref 98–107)
CO2: 28 MMOL/L (ref 22–29)
CREAT SERPL-MCNC: 1.2 MG/DL (ref 0.5–1)
GFR AFRICAN AMERICAN: 55
GFR NON-AFRICAN AMERICAN: 55 ML/MIN/1.73
GLUCOSE BLD-MCNC: 257 MG/DL (ref 74–99)
MAGNESIUM: 2 MG/DL (ref 1.6–2.6)
METER GLUCOSE: 137 MG/DL (ref 74–99)
METER GLUCOSE: 159 MG/DL (ref 74–99)
METER GLUCOSE: 185 MG/DL (ref 74–99)
METER GLUCOSE: 196 MG/DL (ref 74–99)
ORGANISM: ABNORMAL
POTASSIUM SERPL-SCNC: 5.1 MMOL/L (ref 3.5–5)
SODIUM BLD-SCNC: 134 MMOL/L (ref 132–146)
URINE CULTURE, ROUTINE: ABNORMAL
URINE CULTURE, ROUTINE: ABNORMAL

## 2019-04-13 PROCEDURE — 82962 GLUCOSE BLOOD TEST: CPT

## 2019-04-13 PROCEDURE — 36415 COLL VENOUS BLD VENIPUNCTURE: CPT

## 2019-04-13 PROCEDURE — 99233 SBSQ HOSP IP/OBS HIGH 50: CPT | Performed by: INTERNAL MEDICINE

## 2019-04-13 PROCEDURE — 6360000002 HC RX W HCPCS: Performed by: INTERNAL MEDICINE

## 2019-04-13 PROCEDURE — 83735 ASSAY OF MAGNESIUM: CPT

## 2019-04-13 PROCEDURE — APPSS30 APP SPLIT SHARED TIME 16-30 MINUTES: Performed by: PHYSICIAN ASSISTANT

## 2019-04-13 PROCEDURE — 6370000000 HC RX 637 (ALT 250 FOR IP): Performed by: NURSE PRACTITIONER

## 2019-04-13 PROCEDURE — 94660 CPAP INITIATION&MGMT: CPT

## 2019-04-13 PROCEDURE — 6370000000 HC RX 637 (ALT 250 FOR IP): Performed by: INTERNAL MEDICINE

## 2019-04-13 PROCEDURE — 2580000003 HC RX 258: Performed by: INTERNAL MEDICINE

## 2019-04-13 PROCEDURE — 80048 BASIC METABOLIC PNL TOTAL CA: CPT

## 2019-04-13 PROCEDURE — 94640 AIRWAY INHALATION TREATMENT: CPT

## 2019-04-13 PROCEDURE — 1200000000 HC SEMI PRIVATE

## 2019-04-13 RX ADMIN — ENOXAPARIN SODIUM 40 MG: 40 INJECTION SUBCUTANEOUS at 08:17

## 2019-04-13 RX ADMIN — GABAPENTIN 600 MG: 300 CAPSULE ORAL at 13:14

## 2019-04-13 RX ADMIN — IPRATROPIUM BROMIDE AND ALBUTEROL SULFATE 1 AMPULE: .5; 3 SOLUTION RESPIRATORY (INHALATION) at 11:56

## 2019-04-13 RX ADMIN — HYDRALAZINE HYDROCHLORIDE 100 MG: 50 TABLET, FILM COATED ORAL at 21:23

## 2019-04-13 RX ADMIN — CLOPIDOGREL BISULFATE 75 MG: 75 TABLET ORAL at 08:11

## 2019-04-13 RX ADMIN — CLOTRIMAZOLE AND BETAMETHASONE DIPROPIONATE: 10; .5 CREAM TOPICAL at 21:25

## 2019-04-13 RX ADMIN — INSULIN LISPRO 2 UNITS: 100 INJECTION, SOLUTION INTRAVENOUS; SUBCUTANEOUS at 12:42

## 2019-04-13 RX ADMIN — CLOTRIMAZOLE AND BETAMETHASONE DIPROPIONATE: 10; .5 CREAM TOPICAL at 08:11

## 2019-04-13 RX ADMIN — LIDOCAINE: 50 OINTMENT TOPICAL at 08:11

## 2019-04-13 RX ADMIN — Medication 10 ML: at 21:25

## 2019-04-13 RX ADMIN — IPRATROPIUM BROMIDE AND ALBUTEROL SULFATE 1 AMPULE: .5; 3 SOLUTION RESPIRATORY (INHALATION) at 04:36

## 2019-04-13 RX ADMIN — HYDROCORTISONE: 25 CREAM TOPICAL at 21:25

## 2019-04-13 RX ADMIN — IPRATROPIUM BROMIDE AND ALBUTEROL SULFATE 1 AMPULE: .5; 3 SOLUTION RESPIRATORY (INHALATION) at 08:03

## 2019-04-13 RX ADMIN — GABAPENTIN 600 MG: 300 CAPSULE ORAL at 21:23

## 2019-04-13 RX ADMIN — Medication 1 TABLET: at 08:12

## 2019-04-13 RX ADMIN — ISOSORBIDE MONONITRATE 60 MG: 60 TABLET, EXTENDED RELEASE ORAL at 08:13

## 2019-04-13 RX ADMIN — MOMETASONE FUROATE AND FORMOTEROL FUMARATE DIHYDRATE 2 PUFF: 200; 5 AEROSOL RESPIRATORY (INHALATION) at 08:03

## 2019-04-13 RX ADMIN — HYDRALAZINE HYDROCHLORIDE 100 MG: 50 TABLET, FILM COATED ORAL at 13:14

## 2019-04-13 RX ADMIN — QUETIAPINE FUMARATE 25 MG: 25 TABLET ORAL at 21:23

## 2019-04-13 RX ADMIN — PANTOPRAZOLE SODIUM 40 MG: 40 TABLET, DELAYED RELEASE ORAL at 18:12

## 2019-04-13 RX ADMIN — METOPROLOL SUCCINATE 25 MG: 25 TABLET, EXTENDED RELEASE ORAL at 08:11

## 2019-04-13 RX ADMIN — HYDROCORTISONE: 25 CREAM TOPICAL at 08:10

## 2019-04-13 RX ADMIN — BENZONATATE 100 MG: 100 CAPSULE ORAL at 12:40

## 2019-04-13 RX ADMIN — BENZONATATE 100 MG: 100 CAPSULE ORAL at 21:23

## 2019-04-13 RX ADMIN — METHYLPREDNISOLONE SODIUM SUCCINATE 40 MG: 40 INJECTION, POWDER, FOR SOLUTION INTRAMUSCULAR; INTRAVENOUS at 08:17

## 2019-04-13 RX ADMIN — AZITHROMYCIN 250 MG: 250 TABLET, FILM COATED ORAL at 08:13

## 2019-04-13 RX ADMIN — OXYBUTYNIN CHLORIDE 5 MG: 5 TABLET, EXTENDED RELEASE ORAL at 21:23

## 2019-04-13 RX ADMIN — ROSUVASTATIN CALCIUM 5 MG: 5 TABLET, FILM COATED ORAL at 08:12

## 2019-04-13 RX ADMIN — FLUTICASONE PROPIONATE 2 SPRAY: 50 SPRAY, METERED NASAL at 21:25

## 2019-04-13 RX ADMIN — INSULIN LISPRO 2 UNITS: 100 INJECTION, SOLUTION INTRAVENOUS; SUBCUTANEOUS at 21:26

## 2019-04-13 RX ADMIN — IPRATROPIUM BROMIDE AND ALBUTEROL SULFATE 1 AMPULE: .5; 3 SOLUTION RESPIRATORY (INHALATION) at 01:00

## 2019-04-13 RX ADMIN — DULOXETINE HYDROCHLORIDE 60 MG: 60 CAPSULE, DELAYED RELEASE ORAL at 21:23

## 2019-04-13 RX ADMIN — CEFTRIAXONE 1 G: 1 INJECTION, POWDER, FOR SOLUTION INTRAMUSCULAR; INTRAVENOUS at 12:41

## 2019-04-13 RX ADMIN — PANTOPRAZOLE SODIUM 40 MG: 40 TABLET, DELAYED RELEASE ORAL at 06:02

## 2019-04-13 RX ADMIN — IPRATROPIUM BROMIDE AND ALBUTEROL SULFATE 1 AMPULE: .5; 3 SOLUTION RESPIRATORY (INHALATION) at 22:22

## 2019-04-13 RX ADMIN — OLOPATADINE HYDROCHLORIDE 1 DROP: 2 SOLUTION OPHTHALMIC at 08:11

## 2019-04-13 RX ADMIN — BENZONATATE 100 MG: 100 CAPSULE ORAL at 06:02

## 2019-04-13 RX ADMIN — METOPROLOL SUCCINATE 25 MG: 25 TABLET, EXTENDED RELEASE ORAL at 21:24

## 2019-04-13 RX ADMIN — MOMETASONE FUROATE AND FORMOTEROL FUMARATE DIHYDRATE 2 PUFF: 200; 5 AEROSOL RESPIRATORY (INHALATION) at 22:22

## 2019-04-13 RX ADMIN — MONTELUKAST SODIUM 10 MG: 10 TABLET, FILM COATED ORAL at 21:23

## 2019-04-13 RX ADMIN — BUMETANIDE 1 MG: 1 TABLET ORAL at 06:02

## 2019-04-13 RX ADMIN — Medication 10 ML: at 08:13

## 2019-04-13 RX ADMIN — MICONAZOLE NITRATE: 2 OINTMENT TOPICAL at 08:11

## 2019-04-13 RX ADMIN — ASPIRIN 81 MG 81 MG: 81 TABLET ORAL at 08:13

## 2019-04-13 RX ADMIN — HYDROCODONE BITARTRATE AND ACETAMINOPHEN 1 TABLET: 5; 325 TABLET ORAL at 06:34

## 2019-04-13 RX ADMIN — SACUBITRIL AND VALSARTAN 1 TABLET: 24; 26 TABLET, FILM COATED ORAL at 08:13

## 2019-04-13 RX ADMIN — INSULIN GLARGINE 50 UNITS: 100 INJECTION, SOLUTION SUBCUTANEOUS at 08:20

## 2019-04-13 RX ADMIN — CETIRIZINE HYDROCHLORIDE 10 MG: 10 TABLET, FILM COATED ORAL at 08:13

## 2019-04-13 RX ADMIN — FLUTICASONE PROPIONATE 2 SPRAY: 50 SPRAY, METERED NASAL at 08:11

## 2019-04-13 RX ADMIN — URSODIOL 300 MG: 300 CAPSULE ORAL at 08:13

## 2019-04-13 RX ADMIN — HYDRALAZINE HYDROCHLORIDE 100 MG: 50 TABLET, FILM COATED ORAL at 08:12

## 2019-04-13 RX ADMIN — MICONAZOLE NITRATE: 2 OINTMENT TOPICAL at 21:25

## 2019-04-13 RX ADMIN — IPRATROPIUM BROMIDE AND ALBUTEROL SULFATE 1 AMPULE: .5; 3 SOLUTION RESPIRATORY (INHALATION) at 15:47

## 2019-04-13 RX ADMIN — INSULIN LISPRO 2 UNITS: 100 INJECTION, SOLUTION INTRAVENOUS; SUBCUTANEOUS at 17:28

## 2019-04-13 RX ADMIN — HYDROCODONE BITARTRATE AND ACETAMINOPHEN 1 TABLET: 5; 325 TABLET ORAL at 18:43

## 2019-04-13 RX ADMIN — BACLOFEN 10 MG: 10 TABLET ORAL at 08:13

## 2019-04-13 RX ADMIN — DULOXETINE HYDROCHLORIDE 60 MG: 60 CAPSULE, DELAYED RELEASE ORAL at 08:13

## 2019-04-13 RX ADMIN — URSODIOL 300 MG: 300 CAPSULE ORAL at 21:23

## 2019-04-13 RX ADMIN — INSULIN GLARGINE 50 UNITS: 100 INJECTION, SOLUTION SUBCUTANEOUS at 21:27

## 2019-04-13 RX ADMIN — SACUBITRIL AND VALSARTAN 1 TABLET: 24; 26 TABLET, FILM COATED ORAL at 21:23

## 2019-04-13 RX ADMIN — GABAPENTIN 600 MG: 300 CAPSULE ORAL at 08:13

## 2019-04-13 RX ADMIN — HYDROCODONE BITARTRATE AND ACETAMINOPHEN 1 TABLET: 5; 325 TABLET ORAL at 12:41

## 2019-04-13 RX ADMIN — BACLOFEN 10 MG: 10 TABLET ORAL at 21:23

## 2019-04-13 RX ADMIN — Medication 1 MG: at 21:36

## 2019-04-13 ASSESSMENT — PAIN SCALES - GENERAL
PAINLEVEL_OUTOF10: 10
PAINLEVEL_OUTOF10: 8
PAINLEVEL_OUTOF10: 10
PAINLEVEL_OUTOF10: 10
PAINLEVEL_OUTOF10: 8
PAINLEVEL_OUTOF10: 10

## 2019-04-13 ASSESSMENT — PAIN DESCRIPTION - PROGRESSION
CLINICAL_PROGRESSION: NOT CHANGED
CLINICAL_PROGRESSION: GRADUALLY WORSENING
CLINICAL_PROGRESSION: NOT CHANGED

## 2019-04-13 ASSESSMENT — PAIN DESCRIPTION - LOCATION
LOCATION: LEG
LOCATION: CHEST
LOCATION: LEG
LOCATION: LEG

## 2019-04-13 ASSESSMENT — PAIN DESCRIPTION - DESCRIPTORS
DESCRIPTORS: CONSTANT;ACHING;DISCOMFORT
DESCRIPTORS: ACHING;CONSTANT;DISCOMFORT
DESCRIPTORS: SHARP
DESCRIPTORS: CONSTANT;ACHING;DISCOMFORT

## 2019-04-13 ASSESSMENT — PAIN DESCRIPTION - ORIENTATION
ORIENTATION: RIGHT;LEFT
ORIENTATION: MID;UPPER
ORIENTATION: RIGHT;LEFT
ORIENTATION: RIGHT;LEFT

## 2019-04-13 ASSESSMENT — PAIN DESCRIPTION - PAIN TYPE
TYPE: CHRONIC PAIN
TYPE: CHRONIC PAIN
TYPE: ACUTE PAIN
TYPE: CHRONIC PAIN

## 2019-04-13 ASSESSMENT — PAIN DESCRIPTION - FREQUENCY
FREQUENCY: INTERMITTENT
FREQUENCY: CONTINUOUS
FREQUENCY: CONTINUOUS

## 2019-04-13 ASSESSMENT — PAIN - FUNCTIONAL ASSESSMENT
PAIN_FUNCTIONAL_ASSESSMENT: ACTIVITIES ARE NOT PREVENTED
PAIN_FUNCTIONAL_ASSESSMENT: PREVENTS OR INTERFERES SOME ACTIVE ACTIVITIES AND ADLS

## 2019-04-13 ASSESSMENT — PAIN DESCRIPTION - ONSET
ONSET: ON-GOING
ONSET: ON-GOING
ONSET: AWAKENED FROM SLEEP

## 2019-04-13 NOTE — PLAN OF CARE
Problem: Breathing Pattern - Ineffective:  Goal: Ability to achieve and maintain a regular respiratory rate will improve  Description  Ability to achieve and maintain a regular respiratory rate will improve  4/12/2019 2724 by Thresea Babinski, RN  Outcome: Ongoing  4/12/2019 1301 by Joseph Leung RN  Outcome: Met This Shift     Problem: Pain:  Goal: Pain level will decrease  Description  Pain level will decrease  Outcome: Ongoing

## 2019-04-13 NOTE — PROGRESS NOTES
Inhalation Q4H       HYDROcodone 5 mg - acetaminophen 1 tablet Q6H PRN   diphenoxylate-atropine 1 tablet 4x Daily PRN   lidocaine  PRN   sodium chloride flush 10 mL PRN   magnesium hydroxide 30 mL Daily PRN   guaiFENesin 400 mg Q4H PRN   trimethobenzamide 200 mg Q6H PRN   melatonin 1 mg Nightly PRN   acetaminophen 650 mg Q4H PRN   glucose 15 g PRN   dextrose 12.5 g PRN   glucagon (rDNA) 1 mg PRN   dextrose 100 mL/hr PRN        Objective:    /63   Pulse 70   Temp 97.9 °F (36.6 °C) (Oral)   Resp 22   Ht 5' 2\" (1.575 m)   Wt 247 lb 3.2 oz (112.1 kg)   LMP 01/01/1990   SpO2 99% Comment: getting treatment   BMI 45.21 kg/m²   General Appearance: alert and oriented to person, place and time, well-developed and well-nourished, in no acute distress and obese  Skin: warm and dry, no rash or erythema  Head: normocephalic and atraumatic  Pulmonary/Chest: diffuse wheezing throughout, breath sounds diminished throughout, 4 L NC, no respiratory distress   Cardiovascular: normal rate, normal S1 and S2, no gallops and intact distal pulses  Neurologic: cranial nerves grossly intact, speech normal       Recent Labs     04/12/19  1825 04/12/19  2145 04/13/19  0930    134 134   K 5.9* 5.4* 5.1*   CL 94* 96* 96*   CO2 28 30* 28   BUN 31* 30* 32*   CREATININE 1.1* 1.2* 1.2*   GLUCOSE 161* 155* 257*   CALCIUM 9.3 9.1 8.9       Recent Labs     04/10/19  1452   WBC 7.0   RBC 4.63   HGB 14.2   HCT 46.3   .0*   MCH 30.7   MCHC 30.7*   RDW 14.2      MPV 10.5        Radiology:   XR CHEST PORTABLE   Final Result   Correlate clinically for ongoing CHF, fluid overload, etc.   Cannot exclude possible coexisting right basilar pneumonia, with   clinical correlation necessary in this regard also. Continued follow-up advised. NM LUNG VENT/PERFUSION (VQ)   Final Result   Low probability for pulmonary embolism.              Assessment:    Principal Problem:    COPD exacerbation (HCC)  Active Problems:    Chronic combined systolic and diastolic heart failure (HCC)    Coronary artery disease involving native coronary artery of native heart without angina pectoris    Chronic respiratory failure with hypoxia and hypercapnia (HCC)    DAYAN and COPD overlap syndrome (HCC)    Essential hypertension    DM2 (diabetes mellitus, type 2) ([facility] [address]    Morbid obesity due to excess calories ([facility])    Cigarette smoker    Hyperlipidemia    Acute respiratory failure with hypoxia ([facility] [address]    Upper respiratory tract infection    Controlled type 2 diabetes mellitus without complication ([facility] [address]    Coronavirus infection  Resolved Problems:    * No resolved hospital problems. *      Plan:  1. COPD exacerbation: pulm following. Continued IV steroids. breathign treatments. 4 L NC. IV zithromax   2. UTI: urine culture positive for gram negative rods 75,000 CFUs. will add ceftiaxone   3. Chronic combined systolic and diastolic heart failure: continue aspirin and bumex   4. Chronic respiratory failure with hypoxia and hypercapnia: continue supplemental O2. pulm following   5. HTN: stable, continue home meds  6. DM 2: basal insulin with SSI  7. HLD: continue statin  8. Coronavirus: supportive therapy  9. ALEC: creatinine remains 1.2 today. Will continue to monitor, mildly elevated   10.  Leg cramping: will obtain magnesium level        Electronically signed by TONY Borrego on 4/13/2019 at 11:53 AM

## 2019-04-13 NOTE — PROGRESS NOTES
Date: 4/12/2019    Time: 11:56 PM    Patient Placed On BIPAP/CPAP/ Non-Invasive Ventilation? Yes    If no must comment. Facial area red/color change? No           If YES are Blister/Lesion present? No   If yes must notify nursing staff  BIPAP/CPAP skin barrier? Yes    Skin barrier type:Liquicell       Comments: Pt placed on BiPAP for the night, liquicell in place.         Ana Chowdary     04/12/19 6452   NIV Type   Mode BIPAP   Mask Type Full face mask   Mask Size Small   Settings/Measurements   Comfort Level Good   Using Accessory Muscles No   IPAP 23 cmH20   EPAP 17 cmH2O   Rate Ordered 10   Resp 26   O2 Flow Rate (L/min) 4 L/min   Vt Exhaled 763 mL   Mask Leak (lpm) 22 lpm   Skin Protection for O2 Device Yes  (liquicell)

## 2019-04-14 LAB
ANION GAP SERPL CALCULATED.3IONS-SCNC: 7 MMOL/L (ref 7–16)
B.E.: 6.2 MMOL/L (ref -3–3)
BUN BLDV-MCNC: 30 MG/DL (ref 8–23)
CALCIUM SERPL-MCNC: 9.1 MG/DL (ref 8.6–10.2)
CHLORIDE BLD-SCNC: 98 MMOL/L (ref 98–107)
CO2: 32 MMOL/L (ref 22–29)
COHB: 1.5 % (ref 0–1.5)
COMMENT: ABNORMAL
CREAT SERPL-MCNC: 1.1 MG/DL (ref 0.5–1)
CRITICAL: ABNORMAL
DATE ANALYZED: ABNORMAL
DATE OF COLLECTION: ABNORMAL
GFR AFRICAN AMERICAN: >60
GFR NON-AFRICAN AMERICAN: >60 ML/MIN/1.73
GLUCOSE BLD-MCNC: 109 MG/DL (ref 74–99)
HCO3: 31.8 MMOL/L (ref 22–26)
HHB: 3.5 % (ref 0–5)
LAB: ABNORMAL
Lab: ABNORMAL
METER GLUCOSE: 147 MG/DL (ref 74–99)
METER GLUCOSE: 222 MG/DL (ref 74–99)
METER GLUCOSE: 83 MG/DL (ref 74–99)
METER GLUCOSE: 98 MG/DL (ref 74–99)
METHB: 0.2 % (ref 0–1.5)
MODE: ABNORMAL
O2 CONTENT: 19.1 ML/DL
O2 SATURATION: 96.4 % (ref 92–98.5)
O2HB: 94.8 % (ref 94–97)
OPERATOR ID: 316
PATIENT TEMP: 37 C
PCO2: 49.6 MMHG (ref 35–45)
PEEP/CPAP: 17 CMH2O
PH BLOOD GAS: 7.42 (ref 7.35–7.45)
PO2: 87.7 MMHG (ref 60–100)
POTASSIUM REFLEX MAGNESIUM: 4.9 MMOL/L (ref 3.5–5)
PS: 23 CMH20
SODIUM BLD-SCNC: 137 MMOL/L (ref 132–146)
SOURCE, BLOOD GAS: ABNORMAL
THB: 14.3 G/DL (ref 11.5–16.5)
TIME ANALYZED: 1216

## 2019-04-14 PROCEDURE — 82805 BLOOD GASES W/O2 SATURATION: CPT

## 2019-04-14 PROCEDURE — 6370000000 HC RX 637 (ALT 250 FOR IP): Performed by: NURSE PRACTITIONER

## 2019-04-14 PROCEDURE — 36415 COLL VENOUS BLD VENIPUNCTURE: CPT

## 2019-04-14 PROCEDURE — 94640 AIRWAY INHALATION TREATMENT: CPT

## 2019-04-14 PROCEDURE — 6370000000 HC RX 637 (ALT 250 FOR IP): Performed by: INTERNAL MEDICINE

## 2019-04-14 PROCEDURE — 2580000003 HC RX 258: Performed by: INTERNAL MEDICINE

## 2019-04-14 PROCEDURE — 6360000002 HC RX W HCPCS: Performed by: INTERNAL MEDICINE

## 2019-04-14 PROCEDURE — 1200000000 HC SEMI PRIVATE

## 2019-04-14 PROCEDURE — 2500000003 HC RX 250 WO HCPCS: Performed by: INTERNAL MEDICINE

## 2019-04-14 PROCEDURE — APPSS30 APP SPLIT SHARED TIME 16-30 MINUTES: Performed by: PHYSICIAN ASSISTANT

## 2019-04-14 PROCEDURE — 99232 SBSQ HOSP IP/OBS MODERATE 35: CPT | Performed by: INTERNAL MEDICINE

## 2019-04-14 PROCEDURE — 82962 GLUCOSE BLOOD TEST: CPT

## 2019-04-14 PROCEDURE — 2700000000 HC OXYGEN THERAPY PER DAY

## 2019-04-14 PROCEDURE — 80048 BASIC METABOLIC PNL TOTAL CA: CPT

## 2019-04-14 PROCEDURE — 94660 CPAP INITIATION&MGMT: CPT

## 2019-04-14 RX ADMIN — Medication 10 ML: at 16:52

## 2019-04-14 RX ADMIN — PANTOPRAZOLE SODIUM 40 MG: 40 TABLET, DELAYED RELEASE ORAL at 17:13

## 2019-04-14 RX ADMIN — SACUBITRIL AND VALSARTAN 1 TABLET: 24; 26 TABLET, FILM COATED ORAL at 21:04

## 2019-04-14 RX ADMIN — Medication 10 ML: at 09:21

## 2019-04-14 RX ADMIN — Medication 1 TABLET: at 09:06

## 2019-04-14 RX ADMIN — INSULIN GLARGINE 50 UNITS: 100 INJECTION, SOLUTION SUBCUTANEOUS at 21:14

## 2019-04-14 RX ADMIN — INSULIN GLARGINE 50 UNITS: 100 INJECTION, SOLUTION SUBCUTANEOUS at 09:11

## 2019-04-14 RX ADMIN — GABAPENTIN 600 MG: 300 CAPSULE ORAL at 21:04

## 2019-04-14 RX ADMIN — CEFTRIAXONE 1 G: 1 INJECTION, POWDER, FOR SOLUTION INTRAMUSCULAR; INTRAVENOUS at 13:39

## 2019-04-14 RX ADMIN — MICONAZOLE NITRATE: 2 OINTMENT TOPICAL at 09:07

## 2019-04-14 RX ADMIN — DULOXETINE HYDROCHLORIDE 60 MG: 60 CAPSULE, DELAYED RELEASE ORAL at 21:03

## 2019-04-14 RX ADMIN — METHYLPREDNISOLONE SODIUM SUCCINATE 40 MG: 40 INJECTION, POWDER, FOR SOLUTION INTRAMUSCULAR; INTRAVENOUS at 09:20

## 2019-04-14 RX ADMIN — SACUBITRIL AND VALSARTAN 1 TABLET: 24; 26 TABLET, FILM COATED ORAL at 09:06

## 2019-04-14 RX ADMIN — HYDROCODONE BITARTRATE AND ACETAMINOPHEN 1 TABLET: 5; 325 TABLET ORAL at 05:27

## 2019-04-14 RX ADMIN — HYDROCORTISONE: 25 CREAM TOPICAL at 21:06

## 2019-04-14 RX ADMIN — MONTELUKAST SODIUM 10 MG: 10 TABLET, FILM COATED ORAL at 21:04

## 2019-04-14 RX ADMIN — BENZONATATE 100 MG: 100 CAPSULE ORAL at 05:27

## 2019-04-14 RX ADMIN — IPRATROPIUM BROMIDE AND ALBUTEROL SULFATE 1 AMPULE: .5; 3 SOLUTION RESPIRATORY (INHALATION) at 11:42

## 2019-04-14 RX ADMIN — Medication 10 ML: at 21:07

## 2019-04-14 RX ADMIN — CLOPIDOGREL BISULFATE 75 MG: 75 TABLET ORAL at 09:06

## 2019-04-14 RX ADMIN — CLOTRIMAZOLE AND BETAMETHASONE DIPROPIONATE: 10; .5 CREAM TOPICAL at 21:06

## 2019-04-14 RX ADMIN — BENZONATATE 100 MG: 100 CAPSULE ORAL at 13:38

## 2019-04-14 RX ADMIN — DULOXETINE HYDROCHLORIDE 60 MG: 60 CAPSULE, DELAYED RELEASE ORAL at 09:06

## 2019-04-14 RX ADMIN — MICONAZOLE NITRATE: 20 POWDER TOPICAL at 21:17

## 2019-04-14 RX ADMIN — BACLOFEN 10 MG: 10 TABLET ORAL at 21:03

## 2019-04-14 RX ADMIN — GABAPENTIN 600 MG: 300 CAPSULE ORAL at 13:41

## 2019-04-14 RX ADMIN — BACLOFEN 10 MG: 10 TABLET ORAL at 09:06

## 2019-04-14 RX ADMIN — Medication 1 MG: at 21:06

## 2019-04-14 RX ADMIN — HYDROCODONE BITARTRATE AND ACETAMINOPHEN 1 TABLET: 5; 325 TABLET ORAL at 21:06

## 2019-04-14 RX ADMIN — CETIRIZINE HYDROCHLORIDE 10 MG: 10 TABLET, FILM COATED ORAL at 09:06

## 2019-04-14 RX ADMIN — ASPIRIN 81 MG 81 MG: 81 TABLET ORAL at 09:06

## 2019-04-14 RX ADMIN — ERGOCALCIFEROL 50000 UNITS: 1.25 CAPSULE ORAL at 09:06

## 2019-04-14 RX ADMIN — IPRATROPIUM BROMIDE AND ALBUTEROL SULFATE 1 AMPULE: .5; 3 SOLUTION RESPIRATORY (INHALATION) at 16:03

## 2019-04-14 RX ADMIN — FLUTICASONE PROPIONATE 2 SPRAY: 50 SPRAY, METERED NASAL at 21:05

## 2019-04-14 RX ADMIN — ENOXAPARIN SODIUM 40 MG: 40 INJECTION SUBCUTANEOUS at 09:05

## 2019-04-14 RX ADMIN — HYDROCORTISONE: 25 CREAM TOPICAL at 09:07

## 2019-04-14 RX ADMIN — FLUTICASONE PROPIONATE 2 SPRAY: 50 SPRAY, METERED NASAL at 09:07

## 2019-04-14 RX ADMIN — URSODIOL 300 MG: 300 CAPSULE ORAL at 21:03

## 2019-04-14 RX ADMIN — HYDRALAZINE HYDROCHLORIDE 100 MG: 50 TABLET, FILM COATED ORAL at 21:04

## 2019-04-14 RX ADMIN — METOPROLOL SUCCINATE 25 MG: 25 TABLET, EXTENDED RELEASE ORAL at 21:04

## 2019-04-14 RX ADMIN — HYDRALAZINE HYDROCHLORIDE 100 MG: 50 TABLET, FILM COATED ORAL at 09:20

## 2019-04-14 RX ADMIN — HYDROCODONE BITARTRATE AND ACETAMINOPHEN 1 TABLET: 5; 325 TABLET ORAL at 13:38

## 2019-04-14 RX ADMIN — CLOTRIMAZOLE AND BETAMETHASONE DIPROPIONATE: 10; .5 CREAM TOPICAL at 09:07

## 2019-04-14 RX ADMIN — INSULIN LISPRO 4 UNITS: 100 INJECTION, SOLUTION INTRAVENOUS; SUBCUTANEOUS at 17:12

## 2019-04-14 RX ADMIN — ROSUVASTATIN CALCIUM 5 MG: 5 TABLET, FILM COATED ORAL at 09:05

## 2019-04-14 RX ADMIN — METOPROLOL SUCCINATE 25 MG: 25 TABLET, EXTENDED RELEASE ORAL at 09:06

## 2019-04-14 RX ADMIN — HYDRALAZINE HYDROCHLORIDE 100 MG: 50 TABLET, FILM COATED ORAL at 13:38

## 2019-04-14 RX ADMIN — BENZONATATE 100 MG: 100 CAPSULE ORAL at 21:04

## 2019-04-14 RX ADMIN — IPRATROPIUM BROMIDE AND ALBUTEROL SULFATE 1 AMPULE: .5; 3 SOLUTION RESPIRATORY (INHALATION) at 08:04

## 2019-04-14 RX ADMIN — IPRATROPIUM BROMIDE AND ALBUTEROL SULFATE 1 AMPULE: .5; 3 SOLUTION RESPIRATORY (INHALATION) at 23:38

## 2019-04-14 RX ADMIN — MOMETASONE FUROATE AND FORMOTEROL FUMARATE DIHYDRATE 2 PUFF: 200; 5 AEROSOL RESPIRATORY (INHALATION) at 19:58

## 2019-04-14 RX ADMIN — URSODIOL 300 MG: 300 CAPSULE ORAL at 09:05

## 2019-04-14 RX ADMIN — QUETIAPINE FUMARATE 25 MG: 25 TABLET ORAL at 21:03

## 2019-04-14 RX ADMIN — LIDOCAINE: 50 OINTMENT TOPICAL at 09:07

## 2019-04-14 RX ADMIN — AZITHROMYCIN 250 MG: 250 TABLET, FILM COATED ORAL at 09:05

## 2019-04-14 RX ADMIN — BUMETANIDE 1 MG: 1 TABLET ORAL at 05:27

## 2019-04-14 RX ADMIN — MOMETASONE FUROATE AND FORMOTEROL FUMARATE DIHYDRATE 2 PUFF: 200; 5 AEROSOL RESPIRATORY (INHALATION) at 08:04

## 2019-04-14 RX ADMIN — IPRATROPIUM BROMIDE AND ALBUTEROL SULFATE 1 AMPULE: .5; 3 SOLUTION RESPIRATORY (INHALATION) at 19:58

## 2019-04-14 RX ADMIN — OXYBUTYNIN CHLORIDE 5 MG: 5 TABLET, EXTENDED RELEASE ORAL at 21:03

## 2019-04-14 RX ADMIN — ISOSORBIDE MONONITRATE 60 MG: 60 TABLET, EXTENDED RELEASE ORAL at 09:20

## 2019-04-14 RX ADMIN — MICONAZOLE NITRATE: 2 OINTMENT TOPICAL at 21:05

## 2019-04-14 RX ADMIN — IPRATROPIUM BROMIDE AND ALBUTEROL SULFATE 1 AMPULE: .5; 3 SOLUTION RESPIRATORY (INHALATION) at 03:31

## 2019-04-14 RX ADMIN — PANTOPRAZOLE SODIUM 40 MG: 40 TABLET, DELAYED RELEASE ORAL at 05:27

## 2019-04-14 RX ADMIN — OLOPATADINE HYDROCHLORIDE 1 DROP: 2 SOLUTION OPHTHALMIC at 09:07

## 2019-04-14 RX ADMIN — GABAPENTIN 600 MG: 300 CAPSULE ORAL at 09:05

## 2019-04-14 ASSESSMENT — PAIN SCALES - GENERAL
PAINLEVEL_OUTOF10: 0
PAINLEVEL_OUTOF10: 10
PAINLEVEL_OUTOF10: 0
PAINLEVEL_OUTOF10: 10
PAINLEVEL_OUTOF10: 3
PAINLEVEL_OUTOF10: 7
PAINLEVEL_OUTOF10: 8

## 2019-04-14 ASSESSMENT — PAIN DESCRIPTION - LOCATION
LOCATION: BACK
LOCATION: BACK
LOCATION: BACK;LEG
LOCATION: BACK

## 2019-04-14 ASSESSMENT — PAIN DESCRIPTION - PAIN TYPE
TYPE: CHRONIC PAIN
TYPE: ACUTE PAIN
TYPE: CHRONIC PAIN
TYPE: CHRONIC PAIN

## 2019-04-14 ASSESSMENT — PAIN DESCRIPTION - DESCRIPTORS
DESCRIPTORS: ACHING;DISCOMFORT;SORE

## 2019-04-14 ASSESSMENT — PAIN - FUNCTIONAL ASSESSMENT
PAIN_FUNCTIONAL_ASSESSMENT: PREVENTS OR INTERFERES SOME ACTIVE ACTIVITIES AND ADLS

## 2019-04-14 ASSESSMENT — PAIN DESCRIPTION - ORIENTATION: ORIENTATION: RIGHT;LEFT

## 2019-04-14 ASSESSMENT — PAIN DESCRIPTION - PROGRESSION: CLINICAL_PROGRESSION: NOT CHANGED

## 2019-04-14 ASSESSMENT — PAIN DESCRIPTION - FREQUENCY: FREQUENCY: CONTINUOUS

## 2019-04-14 ASSESSMENT — PAIN DESCRIPTION - ONSET: ONSET: ON-GOING

## 2019-04-14 NOTE — PROGRESS NOTES
[facility]   [facility] Note    Admitting Date and Time: 4/10/2019  2:02 PM  Admit Dx: COPD exacerbation ([address] [J44.1]  COPD exacerbation ([facility] [J44.1]  COPD exacerbation ([facility] [J44.1]    Subjective: lying in bed, no acute distress   Patient describes \"full body pain\" and \"shaking\". I do not not any shaking while speaking to patient   Some shortness of breath still  During the end of my exam, patient begin shaking whole body. Opens eyes when her name is said. Full recovery within seconds     Patient was admitted with COPD exacerbation ([facility] [J44.1]  COPD exacerbation ([facility] [J44.1]  COPD exacerbation ([facility] [J44.1]. ROS: denies fever, chills, cp, sob, n/v, HA unless stated above.      cefTRIAXone (ROCEPHIN) IV  1 g Intravenous Q24H    azithromycin  250 mg Oral Daily    methylPREDNISolone  40 mg Intravenous Daily    aspirin  81 mg Oral Daily    baclofen  10 mg Oral BID    bumetanide  1 mg Oral Daily    cetirizine  10 mg Oral Daily    clopidogrel  75 mg Oral Daily    clotrimazole-betamethasone   Topical BID    DULoxetine  60 mg Oral BID    fluticasone  2 spray Nasal BID    gabapentin  600 mg Oral TID    hydrALAZINE  100 mg Oral TID    hydrocortisone   Topical BID    insulin lispro  0-10 Units Subcutaneous 4x Daily AC & HS    isosorbide mononitrate  60 mg Oral Daily    metoprolol succinate  25 mg Oral BID    mometasone-formoterol  2 puff Inhalation Q12H    montelukast  10 mg Oral Nightly    therapeutic multivitamin-minerals  1 tablet Oral Daily    pantoprazole  40 mg Oral BID    QUEtiapine  25 mg Oral Nightly    rosuvastatin  5 mg Oral Daily    sacubitril-valsartan  1 tablet Oral BID    ursodiol  300 mg Oral BID    vitamin D  50,000 Units Oral Weekly    sodium chloride flush  10 mL Intravenous 2 times per day    enoxaparin  40 mg Subcutaneous Daily    benzonatate  100 mg Oral Q8H    insulin glargine  50 Units Subcutaneous BID    oxybutynin  5 mg Oral Nightly  miconazole nitrate   Topical BID    olopatadine  1 drop Both Eyes Daily    ipratropium-albuterol  1 ampule Inhalation Q4H       HYDROcodone 5 mg - acetaminophen 1 tablet Q6H PRN   diphenoxylate-atropine 1 tablet 4x Daily PRN   lidocaine  PRN   sodium chloride flush 10 mL PRN   magnesium hydroxide 30 mL Daily PRN   guaiFENesin 400 mg Q4H PRN   trimethobenzamide 200 mg Q6H PRN   melatonin 1 mg Nightly PRN   acetaminophen 650 mg Q4H PRN   glucose 15 g PRN   dextrose 12.5 g PRN   glucagon (rDNA) 1 mg PRN   dextrose 100 mL/hr PRN        Objective:    /60   Pulse 61   Temp 98 °F (36.7 °C) (Axillary)   Resp 18   Ht 5' 2\" (1.575 m)   Wt 242 lb (109.8 kg)   LMP 01/01/1990   SpO2 98%   BMI 44.26 kg/m²   General Appearance: alert and oriented to person, place and time, well-developed and well-nourished, in no acute distress  Skin: warm and dry, no rash or erythema  Head: normocephalic and atraumatic  Pulmonary/Chest: 4 l NC, diffuse wheezing throughout, breath sounds decreased throughout, no respiratory distress    Cardiovascular: normal rate, normal S1 and S2, no gallops and intact distal pulses  Abdomen: soft, non-tender, non-distended, normal bowel sounds, no masses or organomegaly  Extremities: no cyanosis, no clubbing and no edema  Musculoskeletal: normal range of motion, no joint swelling, deformity or tenderness  Neurologic: cranial nerves grossly intact, speech normal       Recent Labs     04/12/19  2145 04/13/19  0930 04/14/19  0645    134 137   K 5.4* 5.1* 4.9   CL 96* 96* 98   CO2 30* 28 32*   BUN 30* 32* 30*   CREATININE 1.2* 1.2* 1.1*   GLUCOSE 155* 257* 109*   CALCIUM 9.1 8.9 9.1       No results for input(s): WBC, RBC, HGB, HCT, MCV, MCH, MCHC, RDW, PLT, MPV in the last 72 hours.        Radiology:   XR CHEST PORTABLE   Final Result   Correlate clinically for ongoing CHF, fluid overload, etc.   Cannot exclude possible coexisting right basilar pneumonia, with   clinical correlation necessary in this regard also. Continued follow-up advised. NM LUNG VENT/PERFUSION (VQ)   Final Result   Low probability for pulmonary embolism. Assessment:    Principal Problem:    COPD exacerbation (HCC)  Active Problems:    Chronic combined systolic and diastolic heart failure (HCC)    Coronary artery disease involving native coronary artery of native heart without angina pectoris    Chronic respiratory failure with hypoxia and hypercapnia (HCC)    DAYAN and COPD overlap syndrome (HCC)    Essential hypertension    DM2 (diabetes mellitus, type 2) ([facility]    Morbid obesity due to excess calories (HCC)    Cigarette smoker    Hyperlipidemia    Acute respiratory failure with hypoxia (HCC)    Upper respiratory tract infection    Controlled type 2 diabetes mellitus without complication (HCC)    Coronavirus infection    E. coli UTI  Resolved Problems:    * No resolved hospital problems. *      Plan:  1. COPD exacerbation: pulm continues to follow. Continue IV steroids, breathing treatments. Remains on 4 L NC. Continue IV zithromax  2. Intermittent shaking: likely induced by patient. will obtain ABGs. No focal neurologic deficits on exam. Will continue to monitor for now   1. UTI: urine culture positive for gram negative rods 75,000 CFUs. Ceftriaxone started 4/13  2. Chronic combined systolic and diastolic heart failure: continue aspirin and bumex   3. Chronic respiratory failure with hypoxia and hypercapnia: continue supplemental O2, remains on 4 L NC. pulm following   4. HTN: stable, continue home meds  5. DM 2: basal insulin with SSI  6. HLD: continue statin  7. Coronavirus: supportive therapy  8. ALEC: creatinine remains 1.1 today.  Will continue to monitor, mildly elevated         Electronically signed by TONY Swanson on 4/14/2019 at 11:36 AM

## 2019-04-14 NOTE — PROGRESS NOTES
Date: 4/13/2019    Time: 10:46 PM    Patient Placed On BIPAP/CPAP/ Non-Invasive Ventilation? Yes    If no must comment. Facial area red/color change? No           If YES are Blister/Lesion present? No   If yes must notify nursing staff  BIPAP/CPAP skin barrier? Yes    Skin barrier type:Liquicell       Comments: Pt placed on BiPAP for the night, liquicell in place.         Sarkis Ta      04/13/19 5030   NIV Type   Mode BIPAP   Mask Type Full face mask   Mask Size Small   Settings/Measurements   Comfort Level Good   Using Accessory Muscles No   IPAP 23 cmH20   EPAP 17 cmH2O   Rate Ordered 10   Resp 17   SpO2 94   O2 Flow Rate (L/min) 4 L/min   Vt Exhaled 451 mL   Mask Leak (lpm) 26 lpm   Skin Protection for O2 Device Yes  (ollie)

## 2019-04-15 ENCOUNTER — APPOINTMENT (OUTPATIENT)
Dept: CT IMAGING | Age: 64
DRG: 202 | End: 2019-04-15
Payer: MEDICARE

## 2019-04-15 LAB
ANION GAP SERPL CALCULATED.3IONS-SCNC: 8 MMOL/L (ref 7–16)
BLOOD CULTURE, ROUTINE: NORMAL
BUN BLDV-MCNC: 36 MG/DL (ref 8–23)
CALCIUM SERPL-MCNC: 9.4 MG/DL (ref 8.6–10.2)
CHLORIDE BLD-SCNC: 95 MMOL/L (ref 98–107)
CO2: 34 MMOL/L (ref 22–29)
CREAT SERPL-MCNC: 1.3 MG/DL (ref 0.5–1)
CULTURE, BLOOD 2: NORMAL
GFR AFRICAN AMERICAN: 50
GFR NON-AFRICAN AMERICAN: 50 ML/MIN/1.73
GLUCOSE BLD-MCNC: 132 MG/DL (ref 74–99)
METER GLUCOSE: 120 MG/DL (ref 74–99)
METER GLUCOSE: 155 MG/DL (ref 74–99)
METER GLUCOSE: 200 MG/DL (ref 74–99)
METER GLUCOSE: 231 MG/DL (ref 74–99)
POTASSIUM REFLEX MAGNESIUM: 4.8 MMOL/L (ref 3.5–5)
SODIUM BLD-SCNC: 137 MMOL/L (ref 132–146)

## 2019-04-15 PROCEDURE — 82962 GLUCOSE BLOOD TEST: CPT

## 2019-04-15 PROCEDURE — 2580000003 HC RX 258: Performed by: INTERNAL MEDICINE

## 2019-04-15 PROCEDURE — 6370000000 HC RX 637 (ALT 250 FOR IP): Performed by: NURSE PRACTITIONER

## 2019-04-15 PROCEDURE — 6360000002 HC RX W HCPCS: Performed by: INTERNAL MEDICINE

## 2019-04-15 PROCEDURE — 1200000000 HC SEMI PRIVATE

## 2019-04-15 PROCEDURE — 94660 CPAP INITIATION&MGMT: CPT

## 2019-04-15 PROCEDURE — 6370000000 HC RX 637 (ALT 250 FOR IP): Performed by: INTERNAL MEDICINE

## 2019-04-15 PROCEDURE — 99232 SBSQ HOSP IP/OBS MODERATE 35: CPT | Performed by: INTERNAL MEDICINE

## 2019-04-15 PROCEDURE — 70486 CT MAXILLOFACIAL W/O DYE: CPT

## 2019-04-15 PROCEDURE — 6360000002 HC RX W HCPCS: Performed by: CLINICAL NURSE SPECIALIST

## 2019-04-15 PROCEDURE — 2700000000 HC OXYGEN THERAPY PER DAY

## 2019-04-15 PROCEDURE — APPSS30 APP SPLIT SHARED TIME 16-30 MINUTES: Performed by: NURSE PRACTITIONER

## 2019-04-15 PROCEDURE — 94640 AIRWAY INHALATION TREATMENT: CPT

## 2019-04-15 PROCEDURE — 36415 COLL VENOUS BLD VENIPUNCTURE: CPT

## 2019-04-15 PROCEDURE — 80048 BASIC METABOLIC PNL TOTAL CA: CPT

## 2019-04-15 RX ORDER — LEVALBUTEROL INHALATION SOLUTION 0.63 MG/3ML
0.63 SOLUTION RESPIRATORY (INHALATION) EVERY 6 HOURS
Status: DISCONTINUED | OUTPATIENT
Start: 2019-04-15 | End: 2019-04-18 | Stop reason: HOSPADM

## 2019-04-15 RX ADMIN — CLOTRIMAZOLE AND BETAMETHASONE DIPROPIONATE: 10; .5 CREAM TOPICAL at 09:00

## 2019-04-15 RX ADMIN — IPRATROPIUM BROMIDE AND ALBUTEROL SULFATE 1 AMPULE: .5; 3 SOLUTION RESPIRATORY (INHALATION) at 04:05

## 2019-04-15 RX ADMIN — HYDRALAZINE HYDROCHLORIDE 100 MG: 50 TABLET, FILM COATED ORAL at 20:56

## 2019-04-15 RX ADMIN — LEVALBUTEROL HYDROCHLORIDE 0.63 MG: 0.63 SOLUTION RESPIRATORY (INHALATION) at 21:31

## 2019-04-15 RX ADMIN — AZITHROMYCIN 250 MG: 250 TABLET, FILM COATED ORAL at 08:59

## 2019-04-15 RX ADMIN — MICONAZOLE NITRATE: 20 POWDER TOPICAL at 09:00

## 2019-04-15 RX ADMIN — BENZONATATE 100 MG: 100 CAPSULE ORAL at 06:17

## 2019-04-15 RX ADMIN — MOMETASONE FUROATE AND FORMOTEROL FUMARATE DIHYDRATE 2 PUFF: 200; 5 AEROSOL RESPIRATORY (INHALATION) at 09:30

## 2019-04-15 RX ADMIN — DULOXETINE HYDROCHLORIDE 60 MG: 60 CAPSULE, DELAYED RELEASE ORAL at 20:56

## 2019-04-15 RX ADMIN — OLOPATADINE HYDROCHLORIDE 1 DROP: 2 SOLUTION OPHTHALMIC at 09:00

## 2019-04-15 RX ADMIN — SACUBITRIL AND VALSARTAN 1 TABLET: 24; 26 TABLET, FILM COATED ORAL at 08:59

## 2019-04-15 RX ADMIN — LIDOCAINE: 50 OINTMENT TOPICAL at 09:00

## 2019-04-15 RX ADMIN — Medication 10 ML: at 21:08

## 2019-04-15 RX ADMIN — GABAPENTIN 600 MG: 300 CAPSULE ORAL at 08:58

## 2019-04-15 RX ADMIN — GABAPENTIN 600 MG: 300 CAPSULE ORAL at 20:56

## 2019-04-15 RX ADMIN — MICONAZOLE NITRATE: 2 OINTMENT TOPICAL at 21:02

## 2019-04-15 RX ADMIN — INSULIN LISPRO 2 UNITS: 100 INJECTION, SOLUTION INTRAVENOUS; SUBCUTANEOUS at 20:51

## 2019-04-15 RX ADMIN — CEFTRIAXONE 1 G: 1 INJECTION, POWDER, FOR SOLUTION INTRAMUSCULAR; INTRAVENOUS at 12:22

## 2019-04-15 RX ADMIN — PANTOPRAZOLE SODIUM 40 MG: 40 TABLET, DELAYED RELEASE ORAL at 17:04

## 2019-04-15 RX ADMIN — METOPROLOL SUCCINATE 25 MG: 25 TABLET, EXTENDED RELEASE ORAL at 20:56

## 2019-04-15 RX ADMIN — METHYLPREDNISOLONE SODIUM SUCCINATE 40 MG: 40 INJECTION, POWDER, FOR SOLUTION INTRAMUSCULAR; INTRAVENOUS at 08:59

## 2019-04-15 RX ADMIN — BACLOFEN 10 MG: 10 TABLET ORAL at 20:57

## 2019-04-15 RX ADMIN — HYDROCORTISONE: 25 CREAM TOPICAL at 09:00

## 2019-04-15 RX ADMIN — LEVALBUTEROL HYDROCHLORIDE 0.63 MG: 0.63 SOLUTION RESPIRATORY (INHALATION) at 14:25

## 2019-04-15 RX ADMIN — SACUBITRIL AND VALSARTAN 1 TABLET: 24; 26 TABLET, FILM COATED ORAL at 20:56

## 2019-04-15 RX ADMIN — HYDROCORTISONE: 25 CREAM TOPICAL at 21:02

## 2019-04-15 RX ADMIN — Medication 10 ML: at 09:01

## 2019-04-15 RX ADMIN — INSULIN LISPRO 4 UNITS: 100 INJECTION, SOLUTION INTRAVENOUS; SUBCUTANEOUS at 18:16

## 2019-04-15 RX ADMIN — MICONAZOLE NITRATE: 20 POWDER TOPICAL at 20:59

## 2019-04-15 RX ADMIN — CLOPIDOGREL BISULFATE 75 MG: 75 TABLET ORAL at 08:59

## 2019-04-15 RX ADMIN — MOMETASONE FUROATE AND FORMOTEROL FUMARATE DIHYDRATE 2 PUFF: 200; 5 AEROSOL RESPIRATORY (INHALATION) at 21:03

## 2019-04-15 RX ADMIN — INSULIN GLARGINE 50 UNITS: 100 INJECTION, SOLUTION SUBCUTANEOUS at 12:22

## 2019-04-15 RX ADMIN — BACLOFEN 10 MG: 10 TABLET ORAL at 08:59

## 2019-04-15 RX ADMIN — INSULIN GLARGINE 50 UNITS: 100 INJECTION, SOLUTION SUBCUTANEOUS at 20:49

## 2019-04-15 RX ADMIN — ISOSORBIDE MONONITRATE 60 MG: 60 TABLET, EXTENDED RELEASE ORAL at 08:58

## 2019-04-15 RX ADMIN — LIDOCAINE: 50 OINTMENT TOPICAL at 21:00

## 2019-04-15 RX ADMIN — ACETAMINOPHEN 650 MG: 325 TABLET ORAL at 17:04

## 2019-04-15 RX ADMIN — OXYBUTYNIN CHLORIDE 5 MG: 5 TABLET, EXTENDED RELEASE ORAL at 20:57

## 2019-04-15 RX ADMIN — DULOXETINE HYDROCHLORIDE 60 MG: 60 CAPSULE, DELAYED RELEASE ORAL at 08:59

## 2019-04-15 RX ADMIN — QUETIAPINE FUMARATE 25 MG: 25 TABLET ORAL at 20:57

## 2019-04-15 RX ADMIN — INSULIN LISPRO 2 UNITS: 100 INJECTION, SOLUTION INTRAVENOUS; SUBCUTANEOUS at 12:22

## 2019-04-15 RX ADMIN — METOPROLOL SUCCINATE 25 MG: 25 TABLET, EXTENDED RELEASE ORAL at 08:59

## 2019-04-15 RX ADMIN — URSODIOL 300 MG: 300 CAPSULE ORAL at 08:59

## 2019-04-15 RX ADMIN — IPRATROPIUM BROMIDE AND ALBUTEROL SULFATE 1 AMPULE: .5; 3 SOLUTION RESPIRATORY (INHALATION) at 09:29

## 2019-04-15 RX ADMIN — FLUTICASONE PROPIONATE 2 SPRAY: 50 SPRAY, METERED NASAL at 08:59

## 2019-04-15 RX ADMIN — CLOTRIMAZOLE AND BETAMETHASONE DIPROPIONATE: 10; .5 CREAM TOPICAL at 21:10

## 2019-04-15 RX ADMIN — ASPIRIN 81 MG 81 MG: 81 TABLET ORAL at 08:58

## 2019-04-15 RX ADMIN — BENZONATATE 100 MG: 100 CAPSULE ORAL at 20:57

## 2019-04-15 RX ADMIN — Medication 1 TABLET: at 08:59

## 2019-04-15 RX ADMIN — ROSUVASTATIN CALCIUM 5 MG: 5 TABLET, FILM COATED ORAL at 08:59

## 2019-04-15 RX ADMIN — HYDRALAZINE HYDROCHLORIDE 100 MG: 50 TABLET, FILM COATED ORAL at 13:14

## 2019-04-15 RX ADMIN — HYDRALAZINE HYDROCHLORIDE 100 MG: 50 TABLET, FILM COATED ORAL at 08:58

## 2019-04-15 RX ADMIN — HYDROCODONE BITARTRATE AND ACETAMINOPHEN 1 TABLET: 5; 325 TABLET ORAL at 13:14

## 2019-04-15 RX ADMIN — URSODIOL 300 MG: 300 CAPSULE ORAL at 20:57

## 2019-04-15 RX ADMIN — MONTELUKAST SODIUM 10 MG: 10 TABLET, FILM COATED ORAL at 20:57

## 2019-04-15 RX ADMIN — GABAPENTIN 600 MG: 300 CAPSULE ORAL at 13:14

## 2019-04-15 RX ADMIN — BENZONATATE 100 MG: 100 CAPSULE ORAL at 13:14

## 2019-04-15 RX ADMIN — ENOXAPARIN SODIUM 40 MG: 40 INJECTION SUBCUTANEOUS at 08:59

## 2019-04-15 RX ADMIN — HYDROCODONE BITARTRATE AND ACETAMINOPHEN 1 TABLET: 5; 325 TABLET ORAL at 21:15

## 2019-04-15 RX ADMIN — CETIRIZINE HYDROCHLORIDE 10 MG: 10 TABLET, FILM COATED ORAL at 08:59

## 2019-04-15 RX ADMIN — Medication 1 MG: at 21:15

## 2019-04-15 RX ADMIN — PANTOPRAZOLE SODIUM 40 MG: 40 TABLET, DELAYED RELEASE ORAL at 06:18

## 2019-04-15 RX ADMIN — MICONAZOLE NITRATE: 2 OINTMENT TOPICAL at 09:01

## 2019-04-15 ASSESSMENT — PAIN DESCRIPTION - DESCRIPTORS
DESCRIPTORS: ACHING;DISCOMFORT
DESCRIPTORS: ACHING;DISCOMFORT;SORE
DESCRIPTORS: ACHING;DISCOMFORT;SORE

## 2019-04-15 ASSESSMENT — PAIN DESCRIPTION - LOCATION
LOCATION: BACK

## 2019-04-15 ASSESSMENT — PAIN SCALES - GENERAL
PAINLEVEL_OUTOF10: 6
PAINLEVEL_OUTOF10: 4
PAINLEVEL_OUTOF10: 6
PAINLEVEL_OUTOF10: 2
PAINLEVEL_OUTOF10: 9
PAINLEVEL_OUTOF10: 0
PAINLEVEL_OUTOF10: 6

## 2019-04-15 ASSESSMENT — PAIN DESCRIPTION - ONSET
ONSET: ON-GOING
ONSET: ON-GOING

## 2019-04-15 ASSESSMENT — PAIN DESCRIPTION - ORIENTATION
ORIENTATION: LEFT;RIGHT;MID
ORIENTATION: LEFT;RIGHT;MID

## 2019-04-15 ASSESSMENT — PAIN DESCRIPTION - PAIN TYPE
TYPE: CHRONIC PAIN
TYPE: CHRONIC PAIN

## 2019-04-15 ASSESSMENT — PAIN DESCRIPTION - PROGRESSION
CLINICAL_PROGRESSION: NOT CHANGED
CLINICAL_PROGRESSION: NOT CHANGED

## 2019-04-15 ASSESSMENT — PAIN DESCRIPTION - FREQUENCY
FREQUENCY: CONTINUOUS

## 2019-04-15 NOTE — PROGRESS NOTES
Date: 4/14/2019    Time: 10:54 PM    Patient Placed On BIPAP/CPAP/ Non-Invasive Ventilation? Yes    If no must comment. Facial area red/color change? No           If YES are Blister/Lesion present? No   If yes must notify nursing staff  BIPAP/CPAP skin barrier? Yes    Skin barrier type:Liquicell       Comments: Pt placed on BiPAP for the night, liquicell in place.         Glenn Adam      04/14/19 2252   NIV Type   Mode BIPAP   Mask Type Full face mask   Mask Size Small   Settings/Measurements   Comfort Level Good   Using Accessory Muscles No   IPAP 23 cmH20   EPAP 17 cmH2O   Rate Ordered 10   Resp 22   SpO2 95   O2 Flow Rate (L/min) 4 L/min   Vt Exhaled 570 mL   Mask Leak (lpm) 33 lpm   Skin Protection for O2 Device Yes  (liquicell)

## 2019-04-15 NOTE — PROGRESS NOTES
[facility] Progress Note    Admitting Date and Time: 4/10/2019  2:02 PM  Admit Dx: COPD exacerbation ([facility] [J44.1]  COPD exacerbation ([facility] [J44.1]  COPD exacerbation ([facility] [J44.1]    Subjective:  Patient is being followed for COPD exacerbation ([facility] [facility] [J44.1]  COPD exacerbation ([facility] [J44.1]  COPD exacerbation ([facility] [J44.1]     Patient awake, alert, sitting up in chair in room eating dinner- in no acute distress  Reporting ongoing productive cough- blood tinged/ green  Ongoing SAUNDERS-on 4 L NC  Reporting epistaxis  C/o right flank pain, dysuria, urinary frequency  C/o right shoulder being sore  Appetite fair       ROS: denies fever, chills, cp, sob, n/v, HA unless stated above.       levalbuterol  0.63 mg Nebulization Q6H    miconazole   Topical BID    cefTRIAXone (ROCEPHIN) IV  1 g Intravenous Q24H    azithromycin  250 mg Oral Daily    methylPREDNISolone  40 mg Intravenous Daily    aspirin  81 mg Oral Daily    baclofen  10 mg Oral BID    cetirizine  10 mg Oral Daily    clopidogrel  75 mg Oral Daily    clotrimazole-betamethasone   Topical BID    DULoxetine  60 mg Oral BID    fluticasone  2 spray Nasal BID    gabapentin  600 mg Oral TID    hydrALAZINE  100 mg Oral TID    hydrocortisone   Topical BID    insulin lispro  0-10 Units Subcutaneous 4x Daily AC & HS    isosorbide mononitrate  60 mg Oral Daily    metoprolol succinate  25 mg Oral BID    mometasone-formoterol  2 puff Inhalation Q12H    montelukast  10 mg Oral Nightly    therapeutic multivitamin-minerals  1 tablet Oral Daily    pantoprazole  40 mg Oral BID    QUEtiapine  25 mg Oral Nightly    rosuvastatin  5 mg Oral Daily    sacubitril-valsartan  1 tablet Oral BID    ursodiol  300 mg Oral BID    vitamin D  50,000 Units Oral Weekly    sodium chloride flush  10 mL Intravenous 2 times per day    enoxaparin  40 mg Subcutaneous Daily    benzonatate  100 mg Oral Q8H    insulin glargine  50 Units Subcutaneous BID    oxybutynin  5 mg Oral Nightly    miconazole nitrate   Topical BID    olopatadine  1 drop Both Eyes Daily       sodium chloride 2 spray PRN   HYDROcodone 5 mg - acetaminophen 1 tablet Q6H PRN   diphenoxylate-atropine 1 tablet 4x Daily PRN   lidocaine  PRN   sodium chloride flush 10 mL PRN   magnesium hydroxide 30 mL Daily PRN   guaiFENesin 400 mg Q4H PRN   trimethobenzamide 200 mg Q6H PRN   melatonin 1 mg Nightly PRN   acetaminophen 650 mg Q4H PRN   glucose 15 g PRN   dextrose 12.5 g PRN   glucagon (rDNA) 1 mg PRN   dextrose 100 mL/hr PRN        Objective:    /70   Pulse 70   Temp 98 °F (36.7 °C) (Oral)   Resp 18   Ht 5' 2\" (1.575 m)   Wt 241 lb 9 oz (109.6 kg)   LMP 01/01/1990   SpO2 93%   BMI 44.18 kg/m²   General Appearance: alert and oriented to person, place and time and in no acute distress  Skin: warm and dry  Head: normocephalic and atraumatic  Neck: neck supple and non tender without mass   Pulmonary/Chest: diminished throughout- coarse- exp ronchi  Cardiovascular: normal rate, normal S1 and S2 and no carotid bruits  Abdomen: soft, non-tender, non-distended, normal bowel sounds, no masses or organomegaly  Extremities: no cyanosis, no clubbing and trace edema lower extremities   Neurologic: speech normal         Recent Labs     04/13/19  0930 04/14/19  0645 04/15/19  0545    137 137   K 5.1* 4.9 4.8   CL 96* 98 95*   CO2 28 32* 34*   BUN 32* 30* 36*   CREATININE 1.2* 1.1* 1.3*   GLUCOSE 257* 109* 132*   CALCIUM 8.9 9.1 9.4       No results for input(s): WBC, RBC, HGB, HCT, MCV, MCH, MCHC, RDW, PLT, MPV in the last 72 hours.       Assessment:    Principal Problem:    COPD exacerbation (HCC)  Active Problems:    Chronic combined systolic and diastolic heart failure (HCC)    Coronary artery disease involving native coronary artery of native heart without angina pectoris    Chronic respiratory failure with hypoxia and hypercapnia (HCC)    DAYAN and COPD overlap syndrome ([address]    Essential hypertension    DM2 (diabetes mellitus, type 2) ([facility]    Morbid obesity due to excess calories (HCC)    Cigarette smoker    Hyperlipidemia    Acute respiratory failure with hypoxia ([facility]    Upper respiratory tract infection    Controlled type 2 diabetes mellitus without complication (HCC)    Coronavirus infection    E. coli UTI  Resolved Problems:    * No resolved hospital problems. *      Plan:  1. Acute exacerbation of asthma associated to + coronavirus: appreciate pulmonology input. On IV steroids- not planning to wean today. Reporting ongoing SAUNDERS. Continue nebulizers. On azithromax. 2.Chronic hypoxic and hypercapnic respiratory failure: pulmonology following. Continue 02- wean. Nebulizers, steroids. 3. E coli UTI: patient reporting right flank pain, dysuria, and urinary frequency. Continue ceftriaxone- UC reviewed and sensitive. 4. ALEC: hold bumex for now. Encouraged fluid intake overnight. Repeat bmp in am  5. Chronic systolic and diastolic CHF; hold bumex today. 6. HTN: stable. Continue meds  7., DM continue insulin ss  8. HLD: statin  9. Coronavirus: supportive treatment  10. Right shoulder pain: monitor- full rom      Dispo: PT/OT    NOTE: This report was transcribed using voice recognition software. Every effort was made to ensure accuracy; however, inadvertent computerized transcription errors may be present.   Electronically signed by RACHELL Brown on 4/15/2019 at 5:58 PM

## 2019-04-15 NOTE — PROGRESS NOTES
Pulmonary Progress Note    Admit Date: 4/10/2019                            PCP: James Frias DO  Principal Problem:    COPD exacerbation ([facility]  Active Problems:    Chronic combined systolic and diastolic heart failure ([facility]    Coronary artery disease involving native coronary artery of native heart without angina pectoris    Chronic respiratory failure with hypoxia and hypercapnia (HCC)    DAYAN and COPD overlap syndrome ([facility]    Essential hypertension    DM2 (diabetes mellitus, type 2) ([facility]    Morbid obesity due to excess calories ([facility]    Cigarette smoker    Hyperlipidemia    Acute respiratory failure with hypoxia ([facility]    Upper respiratory tract infection    Controlled type 2 diabetes mellitus without complication (HCC)    Coronavirus infection    E. coli UTI  Resolved Problems:    * No resolved hospital problems. *      Subjective:  Sitting up in bedside chair on 4L NC, baseline is room air at home. Very dyspneic with activity. Occasional cough and c/o nose bleed.     Medications:   dextrose          miconazole   Topical BID    cefTRIAXone (ROCEPHIN) IV  1 g Intravenous Q24H    azithromycin  250 mg Oral Daily    methylPREDNISolone  40 mg Intravenous Daily    aspirin  81 mg Oral Daily    baclofen  10 mg Oral BID    bumetanide  1 mg Oral Daily    cetirizine  10 mg Oral Daily    clopidogrel  75 mg Oral Daily    clotrimazole-betamethasone   Topical BID    DULoxetine  60 mg Oral BID    fluticasone  2 spray Nasal BID    gabapentin  600 mg Oral TID    hydrALAZINE  100 mg Oral TID    hydrocortisone   Topical BID    insulin lispro  0-10 Units Subcutaneous 4x Daily AC & HS    isosorbide mononitrate  60 mg Oral Daily    metoprolol succinate  25 mg Oral BID    mometasone-formoterol  2 puff Inhalation Q12H    montelukast  10 mg Oral Nightly    therapeutic multivitamin-minerals  1 tablet Oral Daily    pantoprazole  40 mg Oral BID    QUEtiapine  25 mg Oral Nightly    rosuvastatin  5 mg Oral ALKPHOS, ALT, AST, PROT, BILITOT, BILIDIR, LABALBU in the last 72 hours. PT/INR: No results for input(s): PROTIME, INR in the last 72 hours. Cultures:  Respiratory panel-  Organism Abnormal  04/10/2019  2:52 PM  - [facility]   FILM ARR Coronavirus OC43 Detected      Influenza A/B-Negative  Legionella antigen-Negative    ABG:   Recent Labs     19  1216   PH 7.425   PO2 87.7   PCO2 49.6*   HCO3 31.8*   BE 6.2*   O2SAT 96.4       Films:    VQ scan 4/10/19    Patient MRN: [id number] : 1955 Age:  58 years Gender: Female Order Date: 4/10/2019 3:45 PM Exam: NM LUNG VENT/PERFUSION (VQ) Number of Images: views Indication:  Shortness of breath, chest pain, evaluate for possible pulmonary embolism. COMPARISON:  No prior comparison exam. Correlated with 3/4/2019 CXR exam. No more recent CXR exam currently available for correlation. TECHNIQUE:  Standard-protocol ventilation/perfusion scanning performed, with 7.8 mCi Tc99m MAA administered IV for perfusion portion of exam and 9.1 mCi Xe-133 aerosol administered for ventilation portion of exam. FINDINGS: Ventilation: Relatively symmetric pulmonary activity. No focal ventilation defect(s) seen. No evidence of significant air trapping. Perfusion: Relatively symmetric pulmonary activity. Multiple nonsegmental and subsegmental perfusion defects noted. Low probability for pulmonary embolism. Xr Chest Portable    Result Date: 4/10/2019  Patient MRN: [id number] : 1955 Age:  58 years Gender: Female Order Date: 4/10/2019 2:45 PM Exam: XR CHEST PORTABLE Number of Images: 1 views Indication:   cough, shortness of breath, wheezing cough, shortness of breath, wheezing COMPARISON:  3/4/2019. FINDINGS: Status post previous median sternotomy. Cardiomegaly and central pulmonary vascular congestion. Shallow pulmonary inflation with bilateral perihilar and infrahilar pulmonary infiltrates.  No obvious large pleural effusion, extensive pulmonary consolidation, or

## 2019-04-16 ENCOUNTER — TELEPHONE (OUTPATIENT)
Dept: ADMINISTRATIVE | Age: 64
End: 2019-04-16

## 2019-04-16 LAB
ANION GAP SERPL CALCULATED.3IONS-SCNC: 7 MMOL/L (ref 7–16)
BUN BLDV-MCNC: 31 MG/DL (ref 8–23)
CALCIUM SERPL-MCNC: 9.2 MG/DL (ref 8.6–10.2)
CHLORIDE BLD-SCNC: 96 MMOL/L (ref 98–107)
CO2: 35 MMOL/L (ref 22–29)
CREAT SERPL-MCNC: 1.3 MG/DL (ref 0.5–1)
GFR AFRICAN AMERICAN: 50
GFR NON-AFRICAN AMERICAN: 50 ML/MIN/1.73
GLUCOSE BLD-MCNC: 99 MG/DL (ref 74–99)
HCT VFR BLD CALC: 43.4 % (ref 34–48)
HEMOGLOBIN: 13.3 G/DL (ref 11.5–15.5)
MCH RBC QN AUTO: 30.6 PG (ref 26–35)
MCHC RBC AUTO-ENTMCNC: 30.6 % (ref 32–34.5)
MCV RBC AUTO: 100 FL (ref 80–99.9)
METER GLUCOSE: 170 MG/DL (ref 74–99)
METER GLUCOSE: 177 MG/DL (ref 74–99)
METER GLUCOSE: 83 MG/DL (ref 74–99)
METER GLUCOSE: 86 MG/DL (ref 74–99)
PDW BLD-RTO: 13.2 FL (ref 11.5–15)
PLATELET # BLD: 312 E9/L (ref 130–450)
PMV BLD AUTO: 10.1 FL (ref 7–12)
POTASSIUM REFLEX MAGNESIUM: 5 MMOL/L (ref 3.5–5)
RBC # BLD: 4.34 E12/L (ref 3.5–5.5)
SODIUM BLD-SCNC: 138 MMOL/L (ref 132–146)
WBC # BLD: 10.3 E9/L (ref 4.5–11.5)

## 2019-04-16 PROCEDURE — 6370000000 HC RX 637 (ALT 250 FOR IP): Performed by: INTERNAL MEDICINE

## 2019-04-16 PROCEDURE — 85027 COMPLETE CBC AUTOMATED: CPT

## 2019-04-16 PROCEDURE — 97530 THERAPEUTIC ACTIVITIES: CPT

## 2019-04-16 PROCEDURE — 2580000003 HC RX 258: Performed by: INTERNAL MEDICINE

## 2019-04-16 PROCEDURE — 6360000002 HC RX W HCPCS: Performed by: INTERNAL MEDICINE

## 2019-04-16 PROCEDURE — 97161 PT EVAL LOW COMPLEX 20 MIN: CPT

## 2019-04-16 PROCEDURE — 1200000000 HC SEMI PRIVATE

## 2019-04-16 PROCEDURE — 6360000002 HC RX W HCPCS: Performed by: CLINICAL NURSE SPECIALIST

## 2019-04-16 PROCEDURE — 36415 COLL VENOUS BLD VENIPUNCTURE: CPT

## 2019-04-16 PROCEDURE — 97165 OT EVAL LOW COMPLEX 30 MIN: CPT

## 2019-04-16 PROCEDURE — 82962 GLUCOSE BLOOD TEST: CPT

## 2019-04-16 PROCEDURE — 99232 SBSQ HOSP IP/OBS MODERATE 35: CPT | Performed by: INTERNAL MEDICINE

## 2019-04-16 PROCEDURE — 94660 CPAP INITIATION&MGMT: CPT

## 2019-04-16 PROCEDURE — 6370000000 HC RX 637 (ALT 250 FOR IP): Performed by: NURSE PRACTITIONER

## 2019-04-16 PROCEDURE — 80048 BASIC METABOLIC PNL TOTAL CA: CPT

## 2019-04-16 PROCEDURE — APPSS30 APP SPLIT SHARED TIME 16-30 MINUTES: Performed by: PHYSICIAN ASSISTANT

## 2019-04-16 PROCEDURE — 2700000000 HC OXYGEN THERAPY PER DAY

## 2019-04-16 PROCEDURE — 94640 AIRWAY INHALATION TREATMENT: CPT

## 2019-04-16 RX ORDER — PREDNISONE 20 MG/1
40 TABLET ORAL DAILY
Status: DISCONTINUED | OUTPATIENT
Start: 2019-04-17 | End: 2019-04-18 | Stop reason: HOSPADM

## 2019-04-16 RX ORDER — FLUCONAZOLE 100 MG/1
200 TABLET ORAL ONCE
Status: COMPLETED | OUTPATIENT
Start: 2019-04-16 | End: 2019-04-16

## 2019-04-16 RX ORDER — FLUTICASONE PROPIONATE 50 MCG
1 SPRAY, SUSPENSION (ML) NASAL DAILY
Status: DISCONTINUED | OUTPATIENT
Start: 2019-04-17 | End: 2019-04-18 | Stop reason: HOSPADM

## 2019-04-16 RX ADMIN — BENZONATATE 100 MG: 100 CAPSULE ORAL at 06:28

## 2019-04-16 RX ADMIN — Medication 10 ML: at 09:03

## 2019-04-16 RX ADMIN — INSULIN LISPRO 2 UNITS: 100 INJECTION, SOLUTION INTRAVENOUS; SUBCUTANEOUS at 17:01

## 2019-04-16 RX ADMIN — MONTELUKAST SODIUM 10 MG: 10 TABLET, FILM COATED ORAL at 21:10

## 2019-04-16 RX ADMIN — OLOPATADINE HYDROCHLORIDE 1 DROP: 2 SOLUTION OPHTHALMIC at 09:04

## 2019-04-16 RX ADMIN — LEVALBUTEROL HYDROCHLORIDE 0.63 MG: 0.63 SOLUTION RESPIRATORY (INHALATION) at 01:32

## 2019-04-16 RX ADMIN — INSULIN GLARGINE 50 UNITS: 100 INJECTION, SOLUTION SUBCUTANEOUS at 09:03

## 2019-04-16 RX ADMIN — BACLOFEN 10 MG: 10 TABLET ORAL at 21:10

## 2019-04-16 RX ADMIN — LEVALBUTEROL HYDROCHLORIDE 0.63 MG: 0.63 SOLUTION RESPIRATORY (INHALATION) at 09:19

## 2019-04-16 RX ADMIN — OXYBUTYNIN CHLORIDE 5 MG: 5 TABLET, EXTENDED RELEASE ORAL at 21:10

## 2019-04-16 RX ADMIN — GABAPENTIN 600 MG: 300 CAPSULE ORAL at 13:39

## 2019-04-16 RX ADMIN — MICONAZOLE NITRATE: 20 POWDER TOPICAL at 09:04

## 2019-04-16 RX ADMIN — SACUBITRIL AND VALSARTAN 1 TABLET: 24; 26 TABLET, FILM COATED ORAL at 21:10

## 2019-04-16 RX ADMIN — URSODIOL 300 MG: 300 CAPSULE ORAL at 09:01

## 2019-04-16 RX ADMIN — Medication 1 MG: at 21:22

## 2019-04-16 RX ADMIN — PANTOPRAZOLE SODIUM 40 MG: 40 TABLET, DELAYED RELEASE ORAL at 16:58

## 2019-04-16 RX ADMIN — BENZONATATE 100 MG: 100 CAPSULE ORAL at 21:10

## 2019-04-16 RX ADMIN — GABAPENTIN 600 MG: 300 CAPSULE ORAL at 21:09

## 2019-04-16 RX ADMIN — AZITHROMYCIN 250 MG: 250 TABLET, FILM COATED ORAL at 09:02

## 2019-04-16 RX ADMIN — METHYLPREDNISOLONE SODIUM SUCCINATE 40 MG: 40 INJECTION, POWDER, FOR SOLUTION INTRAMUSCULAR; INTRAVENOUS at 09:03

## 2019-04-16 RX ADMIN — CETIRIZINE HYDROCHLORIDE 10 MG: 10 TABLET, FILM COATED ORAL at 09:01

## 2019-04-16 RX ADMIN — ROSUVASTATIN CALCIUM 5 MG: 5 TABLET, FILM COATED ORAL at 09:01

## 2019-04-16 RX ADMIN — PANTOPRAZOLE SODIUM 40 MG: 40 TABLET, DELAYED RELEASE ORAL at 06:28

## 2019-04-16 RX ADMIN — BACLOFEN 10 MG: 10 TABLET ORAL at 09:02

## 2019-04-16 RX ADMIN — Medication 10 ML: at 21:13

## 2019-04-16 RX ADMIN — GABAPENTIN 600 MG: 300 CAPSULE ORAL at 09:02

## 2019-04-16 RX ADMIN — INSULIN LISPRO 2 UNITS: 100 INJECTION, SOLUTION INTRAVENOUS; SUBCUTANEOUS at 21:15

## 2019-04-16 RX ADMIN — LEVALBUTEROL HYDROCHLORIDE 0.63 MG: 0.63 SOLUTION RESPIRATORY (INHALATION) at 14:33

## 2019-04-16 RX ADMIN — MICONAZOLE NITRATE: 2 OINTMENT TOPICAL at 09:04

## 2019-04-16 RX ADMIN — MICONAZOLE NITRATE: 20 POWDER TOPICAL at 21:11

## 2019-04-16 RX ADMIN — FLUTICASONE PROPIONATE 2 SPRAY: 50 SPRAY, METERED NASAL at 09:04

## 2019-04-16 RX ADMIN — DULOXETINE HYDROCHLORIDE 60 MG: 60 CAPSULE, DELAYED RELEASE ORAL at 21:10

## 2019-04-16 RX ADMIN — QUETIAPINE FUMARATE 25 MG: 25 TABLET ORAL at 21:10

## 2019-04-16 RX ADMIN — ASPIRIN 81 MG 81 MG: 81 TABLET ORAL at 09:01

## 2019-04-16 RX ADMIN — Medication 1 TABLET: at 09:02

## 2019-04-16 RX ADMIN — HYDRALAZINE HYDROCHLORIDE 100 MG: 50 TABLET, FILM COATED ORAL at 13:39

## 2019-04-16 RX ADMIN — HYDROCODONE BITARTRATE AND ACETAMINOPHEN 1 TABLET: 5; 325 TABLET ORAL at 11:13

## 2019-04-16 RX ADMIN — SACUBITRIL AND VALSARTAN 1 TABLET: 24; 26 TABLET, FILM COATED ORAL at 09:01

## 2019-04-16 RX ADMIN — HYDRALAZINE HYDROCHLORIDE 100 MG: 50 TABLET, FILM COATED ORAL at 09:01

## 2019-04-16 RX ADMIN — CLOTRIMAZOLE AND BETAMETHASONE DIPROPIONATE: 10; .5 CREAM TOPICAL at 21:12

## 2019-04-16 RX ADMIN — MOMETASONE FUROATE AND FORMOTEROL FUMARATE DIHYDRATE 2 PUFF: 200; 5 AEROSOL RESPIRATORY (INHALATION) at 20:13

## 2019-04-16 RX ADMIN — HYDROCORTISONE: 25 CREAM TOPICAL at 21:12

## 2019-04-16 RX ADMIN — Medication 10 ML: at 11:13

## 2019-04-16 RX ADMIN — URSODIOL 300 MG: 300 CAPSULE ORAL at 21:10

## 2019-04-16 RX ADMIN — CLOPIDOGREL BISULFATE 75 MG: 75 TABLET ORAL at 09:01

## 2019-04-16 RX ADMIN — CEFTRIAXONE 1 G: 1 INJECTION, POWDER, FOR SOLUTION INTRAMUSCULAR; INTRAVENOUS at 11:13

## 2019-04-16 RX ADMIN — INSULIN GLARGINE 50 UNITS: 100 INJECTION, SOLUTION SUBCUTANEOUS at 21:14

## 2019-04-16 RX ADMIN — DULOXETINE HYDROCHLORIDE 60 MG: 60 CAPSULE, DELAYED RELEASE ORAL at 09:02

## 2019-04-16 RX ADMIN — HYDROCODONE BITARTRATE AND ACETAMINOPHEN 1 TABLET: 5; 325 TABLET ORAL at 21:22

## 2019-04-16 RX ADMIN — BENZONATATE 100 MG: 100 CAPSULE ORAL at 13:26

## 2019-04-16 RX ADMIN — ISOSORBIDE MONONITRATE 60 MG: 60 TABLET, EXTENDED RELEASE ORAL at 09:02

## 2019-04-16 RX ADMIN — LEVALBUTEROL HYDROCHLORIDE 0.63 MG: 0.63 SOLUTION RESPIRATORY (INHALATION) at 20:14

## 2019-04-16 RX ADMIN — LIDOCAINE: 50 OINTMENT TOPICAL at 17:04

## 2019-04-16 RX ADMIN — MOMETASONE FUROATE AND FORMOTEROL FUMARATE DIHYDRATE 2 PUFF: 200; 5 AEROSOL RESPIRATORY (INHALATION) at 09:20

## 2019-04-16 RX ADMIN — CLOTRIMAZOLE AND BETAMETHASONE DIPROPIONATE: 10; .5 CREAM TOPICAL at 09:04

## 2019-04-16 RX ADMIN — HYDROCORTISONE: 25 CREAM TOPICAL at 09:04

## 2019-04-16 RX ADMIN — METOPROLOL SUCCINATE 25 MG: 25 TABLET, EXTENDED RELEASE ORAL at 09:02

## 2019-04-16 RX ADMIN — ENOXAPARIN SODIUM 40 MG: 40 INJECTION SUBCUTANEOUS at 09:02

## 2019-04-16 RX ADMIN — FLUCONAZOLE 200 MG: 100 TABLET ORAL at 16:58

## 2019-04-16 RX ADMIN — LIDOCAINE: 50 OINTMENT TOPICAL at 21:12

## 2019-04-16 RX ADMIN — MICONAZOLE NITRATE: 2 OINTMENT TOPICAL at 21:13

## 2019-04-16 RX ADMIN — METOPROLOL SUCCINATE 25 MG: 25 TABLET, EXTENDED RELEASE ORAL at 21:10

## 2019-04-16 RX ADMIN — HYDRALAZINE HYDROCHLORIDE 100 MG: 50 TABLET, FILM COATED ORAL at 21:10

## 2019-04-16 ASSESSMENT — PAIN SCALES - GENERAL
PAINLEVEL_OUTOF10: 0
PAINLEVEL_OUTOF10: 5
PAINLEVEL_OUTOF10: 10
PAINLEVEL_OUTOF10: 10
PAINLEVEL_OUTOF10: 0

## 2019-04-16 ASSESSMENT — PAIN DESCRIPTION - ONSET
ONSET: GRADUAL
ONSET: ON-GOING

## 2019-04-16 ASSESSMENT — PAIN DESCRIPTION - LOCATION
LOCATION: BACK
LOCATION: BACK

## 2019-04-16 ASSESSMENT — PAIN DESCRIPTION - ORIENTATION
ORIENTATION: RIGHT;LOWER
ORIENTATION: LOWER

## 2019-04-16 ASSESSMENT — PAIN DESCRIPTION - FREQUENCY
FREQUENCY: INTERMITTENT
FREQUENCY: CONTINUOUS

## 2019-04-16 ASSESSMENT — PAIN DESCRIPTION - PROGRESSION
CLINICAL_PROGRESSION: NOT CHANGED

## 2019-04-16 ASSESSMENT — PAIN DESCRIPTION - DESCRIPTORS
DESCRIPTORS: ACHING;DISCOMFORT
DESCRIPTORS: ACHING;DISCOMFORT;DULL

## 2019-04-16 ASSESSMENT — PAIN DESCRIPTION - PAIN TYPE
TYPE: CHRONIC PAIN
TYPE: CHRONIC PAIN

## 2019-04-16 NOTE — PROGRESS NOTES
Date: 4/15/2019    Time: 9:44 PM    Patient Placed On BIPAP/CPAP/ Non-Invasive Ventilation? Yes    If no must comment. Facial area red/color change? No           If YES are Blister/Lesion present? No   If yes must notify nursing staff  BIPAP/CPAP skin barrier?   Yes    Skin barrier type:Liquicel       Comments:        Gilberto Coto

## 2019-04-16 NOTE — PROGRESS NOTES
57 yo female with asthma, obstructive sleep apnea, obesity admitted with mild hemoptysis, coronavirus infection, and acute exacerbation of asthma. CC/Overnight events: Feels wheezing about the same. Still blowing some blood out of the nose. Feels chest still tight. She c/o continued headache (says she saw neurology prior to admission and about tremor which is worse on right than on left). CT of brain done as recently as 1/9/19 showed:  1. No CT evidence of acute intracranial abnormality, as questioned. If   there is further clinical concern, MRI of the brain would be   recommended for more detailed evaluation. .   2. Wedge-shaped hypodensity right cerebellum, seen on previous exam   1/4/2019 and 7/28/2018 consistent with a remote cerebrovascular   infarction of the right cerebellum. 3. Debris or soft tissue density noted in the bilateral external   auditory canals, nonspecific finding, findings could reflect   interstitial room and, with the possibility of an external auditory   canal cholesteatoma not excluded. . Clinical correlation with physical   exam and direct visualization is recommended. 4. Empty sella. 5. Mild cerebral body loss/atrophy with white matter   changes/hypodensities bilateral centrum semiovale and periventricular   region suggestive of sequelae small vessel ischemic disease.            Current Facility-Administered Medications   Medication Dose Route Frequency Provider Last Rate Last Dose    fluconazole (DIFLUCAN) tablet 200 mg  200 mg Oral Once Han Ochoa MD        levalbuterol Levonia Essex) nebulization 0.63 mg  0.63 mg Nebulization Q6H PATRIZIA Colon - CNS   0.63 mg at 04/16/19 1433    sodium chloride (OCEAN, BABY AYR) 0.65 % nasal spray 2 spray  2 spray Each Nare PRN Danya Osullivan MD        miconazole (MICOTIN) 2 % powder   Topical BID Yanet Lehman MD        cefTRIAXone (ROCEPHIN) 1 g in dextrose 5 % 50 mL IVPB (vial-mate)  1 g Intravenous Q24H Terry Mims Abdirizak Portillo MD   Stopped at 04/16/19 1201    HYDROcodone-acetaminophen (NORCO) 5-325 MG per tablet 1 tablet  1 tablet Oral Q6H PRN PATRIZIA Eng - CNP   1 tablet at 04/16/19 1113    azithromycin (ZITHROMAX) tablet 250 mg  250 mg Oral Daily Elida Pritchett MD   250 mg at 04/16/19 0902    methylPREDNISolone sodium (SOLU-MEDROL) injection 40 mg  40 mg Intravenous Daily Elida Pritchett MD   40 mg at 04/16/19 5633    aspirin chewable tablet 81 mg  81 mg Oral Daily Odette Parker MD   81 mg at 04/16/19 0901    baclofen (LIORESAL) tablet 10 mg  10 mg Oral BID Odette Parker MD   10 mg at 04/16/19 0902    cetirizine (ZYRTEC) tablet 10 mg  10 mg Oral Daily Odette Parker MD   10 mg at 04/16/19 0901    clopidogrel (PLAVIX) tablet 75 mg  75 mg Oral Daily Odette Parker MD   75 mg at 04/16/19 0901    clotrimazole-betamethasone (LOTRISONE) cream   Topical BID Odette Parker MD        diphenoxylate-atropine (LOMOTIL) 2.5-0.025 MG per tablet 1 tablet  1 tablet Oral 4x Daily PRN Odette Parker MD        DULoxetine (CYMBALTA) extended release capsule 60 mg  60 mg Oral BID Odette Parker MD   60 mg at 04/16/19 0902    fluticasone (FLONASE) 50 MCG/ACT nasal spray 2 spray  2 spray Nasal BID Odette Parker MD   2 spray at 04/16/19 0045    gabapentin (NEURONTIN) capsule 600 mg  600 mg Oral TID Odette Parker MD   600 mg at 04/16/19 1339    hydrALAZINE (APRESOLINE) tablet 100 mg  100 mg Oral TID Odette Parker MD   100 mg at 04/16/19 1339    hydrocortisone 2.5 % cream   Topical BID Odette Parker MD        insulin lispro (HUMALOG) injection vial 0-10 Units  0-10 Units Subcutaneous 4x Daily AC & HS Odette Parker MD   2 Units at 04/15/19 2051    isosorbide mononitrate (IMDUR) extended release tablet 60 mg  60 mg Oral Daily Odette Parker MD   60 mg at 04/16/19 0902    lidocaine (XYLOCAINE) 5 % ointment   Topical PRN Gogo DUDLEY Bora Muse MD        metoprolol succinate (TOPROL XL) extended release tablet 25 mg  25 mg Oral BID Regan Stein MD   25 mg at 04/16/19 0902    mometasone-formoterol (DULERA) 200-5 MCG/ACT inhaler 2 puff  2 puff Inhalation Q12H Regan Stein MD   2 puff at 04/16/19 0920    montelukast (SINGULAIR) tablet 10 mg  10 mg Oral Nightly Regan Stein MD   10 mg at 04/15/19 2057    therapeutic multivitamin-minerals 1 tablet  1 tablet Oral Daily Regan Stein MD   1 tablet at 04/16/19 0902    pantoprazole (PROTONIX) tablet 40 mg  40 mg Oral BID Regan Stein MD   40 mg at 04/16/19 0728    QUEtiapine (SEROQUEL) tablet 25 mg  25 mg Oral Nightly Regan Stein MD   25 mg at 04/15/19 2057    rosuvastatin (CRESTOR) tablet 5 mg  5 mg Oral Daily Regan Stein MD   5 mg at 04/16/19 0901    sacubitril-valsartan (ENTRESTO) 24-26 MG per tablet 1 tablet  1 tablet Oral BID Regan Stein MD   1 tablet at 04/16/19 0901    ursodiol (ACTIGALL) capsule 300 mg  300 mg Oral BID Regan Stein MD   300 mg at 04/16/19 0901    vitamin D (ERGOCALCIFEROL) capsule 50,000 Units  50,000 Units Oral Weekly Regan Stein MD   50,000 Units at 04/14/19 0906    sodium chloride flush 0.9 % injection 10 mL  10 mL Intravenous 2 times per day Regan Stein MD   10 mL at 04/16/19 0903    sodium chloride flush 0.9 % injection 10 mL  10 mL Intravenous PRN Regan Stein MD   10 mL at 04/16/19 1113    magnesium hydroxide (MILK OF MAGNESIA) 400 MG/5ML suspension 30 mL  30 mL Oral Daily PRN Regan Stein MD        enoxaparin (LOVENOX) injection 40 mg  40 mg Subcutaneous Daily Regan Stein MD   40 mg at 04/16/19 0902    benzonatate (TESSALON) capsule 100 mg  100 mg Oral Q8H Regan Stein MD   100 mg at 04/16/19 1326    guaiFENesin tablet 400 mg  400 mg Oral Q4H PRN Regan Stein MD   400 mg at 04/11/19 0535    insulin glargine (LANTUS) injection Differential:    Lab Results   Component Value Date    WBC 10.3 04/16/2019    RBC 4.34 04/16/2019    HGB 13.3 04/16/2019    HCT 43.4 04/16/2019     04/16/2019    .0 04/16/2019    MCH 30.6 04/16/2019    MCHC 30.6 04/16/2019    RDW 13.2 04/16/2019    NRBC 0.0 10/31/2017    SEGSPCT 84 03/03/2014    LYMPHOPCT 15.8 04/10/2019    MONOPCT 4.0 04/10/2019    BASOPCT 0.3 04/10/2019    MONOSABS 0.28 04/10/2019    LYMPHSABS 1.11 04/10/2019    EOSABS 0.14 04/10/2019    BASOSABS 0.02 04/10/2019     CMP:    Lab Results   Component Value Date     04/16/2019    K 5.0 04/16/2019    CL 96 04/16/2019    CO2 35 04/16/2019    BUN 31 04/16/2019    CREATININE 1.3 04/16/2019    GFRAA 50 04/16/2019    LABGLOM 50 04/16/2019    GLUCOSE 99 04/16/2019    GLUCOSE 181 12/01/2011    PROT 7.4 04/10/2019    LABALBU 3.8 04/10/2019    LABALBU 5.2 12/01/2011    CALCIUM 9.2 04/16/2019    BILITOT 0.3 04/10/2019    ALKPHOS 74 04/10/2019    AST 23 04/10/2019    ALT 18 04/10/2019     ABG:    Lab Results   Component Value Date    PH 7.425 04/14/2019    PCO2 49.6 04/14/2019    PO2 87.7 04/14/2019    HCO3 31.8 04/14/2019    BE 6.2 04/14/2019    O2SAT 96.4 04/14/2019         CXR reviewed:   CT SINUS WO CONTRAST   Final Result      NORMAL CT OF THE PARANASAL SINUSES. XR CHEST PORTABLE   Final Result   Correlate clinically for ongoing CHF, fluid overload, etc.   Cannot exclude possible coexisting right basilar pneumonia, with   clinical correlation necessary in this regard also. Continued follow-up advised. NM LUNG VENT/PERFUSION (VQ)   Final Result   Low probability for pulmonary embolism. Impression:  1. Coronavirus infection  2. Asthma acute exacerbation due to #1  3. \"hemoptysis\" may be mostly epistaxis  4.  DAYAN on BiPAP    Principal Problem:    COPD exacerbation (HCC)  Active Problems:    Chronic combined systolic and diastolic heart failure (HCC)    Coronary artery disease involving native coronary artery of native heart without angina pectoris    Chronic respiratory failure with hypoxia and hypercapnia (HCC)    DAYAN and COPD overlap syndrome ([facility] [address]    Essential hypertension    DM2 (diabetes mellitus, type 2) ([facility]    Morbid obesity due to excess calories (HCC)    Cigarette smoker    Hyperlipidemia    Acute respiratory failure with hypoxia (HCC)    Upper respiratory tract infection    Controlled type 2 diabetes mellitus without complication (HCC)    Coronavirus infection    E. coli UTI  Resolved Problems:    * No resolved hospital problems. *      Plans:   1. Stop antibiotics - Procalcitonin unremarkable  2. Discontinue IV solumedrol - start prednisone in am  3.  Saline nasal spray and hold flonase 1 to 2 days    Hilton Garcia MD, FACP, [facility]

## 2019-04-16 NOTE — PROGRESS NOTES
Clary Lozano Hospitalist   Progress Note    Admitting Date and Time: 4/10/2019  2:02 PM  Admit Dx: COPD exacerbation ([address] [J44.1]  COPD exacerbation ([facility] [address] [J44.1]  COPD exacerbation ([address] [J44.1]    Subjective:    Patient was admitted with COPD exacerbation ([facility] [address] [J44.1]  [facility] [J44.1]  COPD exacerbation ([facility] [J44.1]. Patient states that she feels the same today. She states that she is having a lot of shaking after albuterol treatments. She states that she is still having productive cough and SOB is about the same. ROS: denies fever, chills, cp, sob, n/v, HA unless stated above.      levalbuterol  0.63 mg Nebulization Q6H    miconazole   Topical BID    cefTRIAXone (ROCEPHIN) IV  1 g Intravenous Q24H    azithromycin  250 mg Oral Daily    methylPREDNISolone  40 mg Intravenous Daily    aspirin  81 mg Oral Daily    baclofen  10 mg Oral BID    cetirizine  10 mg Oral Daily    clopidogrel  75 mg Oral Daily    clotrimazole-betamethasone   Topical BID    DULoxetine  60 mg Oral BID    fluticasone  2 spray Nasal BID    gabapentin  600 mg Oral TID    hydrALAZINE  100 mg Oral TID    hydrocortisone   Topical BID    insulin lispro  0-10 Units Subcutaneous 4x Daily AC & HS    isosorbide mononitrate  60 mg Oral Daily    metoprolol succinate  25 mg Oral BID    mometasone-formoterol  2 puff Inhalation Q12H    montelukast  10 mg Oral Nightly    therapeutic multivitamin-minerals  1 tablet Oral Daily    pantoprazole  40 mg Oral BID    QUEtiapine  25 mg Oral Nightly    rosuvastatin  5 mg Oral Daily    sacubitril-valsartan  1 tablet Oral BID    ursodiol  300 mg Oral BID    vitamin D  50,000 Units Oral Weekly    sodium chloride flush  10 mL Intravenous 2 times per day    enoxaparin  40 mg Subcutaneous Daily    benzonatate  100 mg Oral Q8H    insulin glargine  50 Units Subcutaneous BID    oxybutynin  5 mg Oral Nightly    miconazole nitrate   Topical BID    olopatadine 1 drop Both Eyes Daily       sodium chloride 2 spray PRN   HYDROcodone 5 mg - acetaminophen 1 tablet Q6H PRN   diphenoxylate-atropine 1 tablet 4x Daily PRN   lidocaine  PRN   sodium chloride flush 10 mL PRN   magnesium hydroxide 30 mL Daily PRN   guaiFENesin 400 mg Q4H PRN   trimethobenzamide 200 mg Q6H PRN   melatonin 1 mg Nightly PRN   acetaminophen 650 mg Q4H PRN   glucose 15 g PRN   dextrose 12.5 g PRN   glucagon (rDNA) 1 mg PRN   dextrose 100 mL/hr PRN        Objective:    BP (!) 155/75   Pulse 61   Temp 96.8 °F (36 °C) (Axillary)   Resp 16   Ht 5' 2\" (1.575 m)   Wt 242 lb (109.8 kg)   LMP 01/01/1990   SpO2 95%   BMI 44.26 kg/m²   General Appearance: alert and oriented to person, place and time, well-developed and well-nourished, in no acute distress  Skin: warm and dry, no rash or erythema  Head: normocephalic and atraumatic  Eyes: extraocular eye movements intact and conjunctivae normal  Neck: neck supple and non tender without mass and no thyromegaly   Pulmonary/Chest: Ed Rhonchi  Cardiovascular: normal rate, regular rhythm, normal S1 and S2, no murmurs, no gallops and no JVD  Abdomen: soft, non-tender, non-distended, normal bowel sounds, no masses or organomegaly  Extremities: no cyanosis, no clubbing and no edema      Recent Labs     04/14/19  0645 04/15/19  0545 04/16/19  0330    137 138   K 4.9 4.8 5.0   CL 98 95* 96*   CO2 32* 34* 35*   BUN 30* 36* 31*   CREATININE 1.1* 1.3* 1.3*   GLUCOSE 109* 132* 99   CALCIUM 9.1 9.4 9.2       Recent Labs     04/16/19  0330   WBC 10.3   RBC 4.34   HGB 13.3   HCT 43.4   .0*   MCH 30.6   MCHC 30.6*   RDW 13.2      MPV 10.1       Radiology:   CT SINUS WO CONTRAST   Final Result      NORMAL CT OF THE PARANASAL SINUSES. XR CHEST PORTABLE   Final Result   Correlate clinically for ongoing CHF, fluid overload, etc.   Cannot exclude possible coexisting right basilar pneumonia, with   clinical correlation necessary in this regard also. Continued follow-up advised. NM LUNG VENT/PERFUSION (VQ)   Final Result   Low probability for pulmonary embolism. Assessment:    Principal Problem:    COPD exacerbation (HCC)  Active Problems:    Chronic combined systolic and diastolic heart failure (HCC)    Coronary artery disease involving native coronary artery of native heart without angina pectoris    Chronic respiratory failure with hypoxia and hypercapnia (HCC)    DAYAN and COPD overlap syndrome (HCC)    Essential hypertension    DM2 (diabetes mellitus, type 2) ([facility]    Morbid obesity due to excess calories (HCC)    Cigarette smoker    Hyperlipidemia    Acute respiratory failure with hypoxia (HCC)    Upper respiratory tract infection    Controlled type 2 diabetes mellitus without complication (HCC)    Coronavirus infection    E. coli UTI  Resolved Problems:    * No resolved hospital problems. *      Plan:  1. Acute exacerbation of asthma   - positive respiratory panel, coronavirus  - Pulm following   - On IV steroids, nebs and azithromycin    2. Chronic Hypoxic respiratory failure   - Currently on 5L NC, does not use O2 at home     3. UTI   - Continue Ceftriaxone     4. ALEC  - Bumex on hold, Continue   - Encouraged oral intake     5. Chronic systolic and diastolic heart failure     6. HTN:   - Stable, Continue     7. DM:   - Fasting glucose 99  - continue insulin ss    8. HLD:   - Continue Crestor     9. Coronavirus:  - Continue supportive treatment    10.  Headache/ Sinus pressure   - CT of sinuses ordered by Pulm, no acute findings        Electronically signed by Tosha Joy PA-C on 4/16/2019 at 1:58 PM

## 2019-04-16 NOTE — PATIENT CARE CONFERENCE
[facility] Quality Flow/Interdisciplinary Rounds Progress Note        Quality Flow Rounds held on April 16, 2019    Disciplines Attending:  Bedside Nurse, ,  and Nursing Unit Leadership    Fidelia Bowens was admitted on 4/10/2019  2:02 PM    Anticipated Discharge Date:  Expected Discharge Date: 04/13/19    Disposition:    Jadon Score:  Jadon Scale Score: 20    Readmission Risk              Risk of Unplanned Readmission:        61           Discussed patient goal for the day, patient clinical progression, and barriers to discharge.   The following Goal(s) of the Day/Commitment(s) have been identified:  wean steroids continue antibiotics Pulmonary recommendations      Breonna Severino  April 16, 2019

## 2019-04-16 NOTE — TELEPHONE ENCOUNTER
Pt calling asking if ok with Dr. Brenda Robert to have dental procedure teeth - tooth breaking off - before May? She is currently an IP (room number [phone]).

## 2019-04-16 NOTE — PROGRESS NOTES
Occupational Therapy  OCCUPATIONAL THERAPY INITIAL EVALUATION      Date:2019  Patient Name: Amanda Funez  MRN: [id number]  : 1955  Room: [address] [place] [address]        Evaluating OT: Ollie Shayy OTR/L [id number]    AM-PAC Daily Activity Raw Score:     Recommended Adaptive Equipment: TBD     Diagnosis: COPD    Pertinent Medical History: asthma,  Cerebellum infarct 2019, neuropathy, OA, PVD   Precautions:  Falls, alarm, O2, [place]     Home Living: Pt lives alone. 1 floor with 2 steps/2 rails. Caregivers 7 days/week - assist with lower body ADLs and IADLs. Tub/shower unit with seat   Prior Level of Function: assist  with ADLs , assist  with IADLs; ambulated walke    Pain Level: no pain ;   Cognition:  Oriented,alert and conversing     Judgement/safety:  Fair      Functional Assessment:   Initial Eval Status  Date: 19 Tx Session  Date:  Short Term Goals  Treatment frequency: PRN   Feeding Set-up      Grooming SBA/set-up,seated   Mod I   UB Dressing Mod A  Managing hospital gown  Mod I   LB Dressing Max A  PTA: assist   Min A   Bathing      Toileting      Bed Mobility  Sitting in chair upon arrival      Functional Transfers Supervision   Sit-stand from chair   Mod I   Functional Mobility Supervision,w/walker, O2  Household distance   Mod I with good tolerance    Balance Sitting:     Static:  Independent     Standing: supervision      Activity Tolerance SOB with activity   O2 sats in 90 s during activity on 4 L O2  Good with ADL activity    Visual/  Perceptual Glasses: reading                    Strength ROM Additional Info:    RUE  3+/5  WFL    R hand tremors noted  good  and wfl FMC/dexterity noted during ADL tasks       LUE 3+/5  WFL good  and wfl FMC/dexterity noted during ADL tasks         Hearing: [place]   Sensation:  No c/o numbness or tingling                             Comments/Treatment: Upon arrival, patient sitting in chair .   At end of session, patient returned to chair  with call light and phone within reach, all lines and tubes intact. Educated on breathing tevch. Pt would benefit from continued skilled OT to increase safety and independence with completion of ADL/IADL tasks for functional independence and quality of life. ALARM ON     Eval Complexity: Low    Assessment of current deficits   Functional mobility [x]  ADLs [x] Strength [x]  Cognition []  Functional transfers  [x] IADLs [x] Safety Awareness [x]  Endurance [x]  Fine Motor Coordination [] Balance [x] Vision/perception [] Sensation []   Gross Motor Coordination [] ROM [] Delirium []                  Motor Control []    Plan of Care:   ADL retraining [x]   Equipment needs [x]   Neuromuscular re-education [x] Energy Conservation Techniques [x]  Functional Transfer training [x] Patient and/or Family Education [x]  Functional Mobility training [x]  Environmental Modifications [x]  Cognitive re-training []   Compensatory techniques for ADLs [x]  Splinting Needs []   Positioning to improve overall function [x]   Therapeutic Activity [x]  Therapeutic Exercise  [x]  Visual/Perceptual: []    Delirium prevention/treatment  []   Other:  []    Rehab Potential: Good for established goals     Patient / Family Goal: return home       Patient and/or family were instructed on functional diagnosis, prognosis/goals and OT plan of care. Demonstrated good  understanding.     Low Evaluation     Evaluation time includes thorough review of current medical information, gathering information on past medical history/social history and prior level of function, completion of standardized testing/informal observation of tasks, assessment of data, and development of POC/Goals      Meir Michel OTR/L [id number]

## 2019-04-16 NOTE — PROGRESS NOTES
appropriate, clear delegation orders will be provided for nursing staff. Exercises and functional mobility practice will be used as well as appropriate assistive devices or modalities to obtain goals. Patient and family education will also be administered as needed. Frequency of treatments will be 2-5x/week x 3-5 days. Time in: 12:10  Time out: 12:35    Olman Redding., P.T.    License number:  [id number]

## 2019-04-16 NOTE — CARE COORDINATION
Social Work / discharge planning    4/16/2019 2:50 PM SW consult received  See SW note from 4/11   Lives alone.   reports being independent pta with adl's   Has passport  services    PAC 20/24     Plan is home   Electronically signed by Karemn Trevizo [facility] on 4/16/2019 at 2:51 PM

## 2019-04-16 NOTE — PROGRESS NOTES
Date: 4/16/2019    Time: 12:24 AM    Patient Placed On BIPAP/CPAP/ Non-Invasive Ventilation? No    If no must comment. Facial area red/color change? No           If YES are Blister/Lesion present? No   If yes must notify nursing staff  BIPAP/CPAP skin barrier?   Yes    Skin barrier type:Liquicel       Comments:        Chaz Briggs

## 2019-04-16 NOTE — PLAN OF CARE
Problem: Falls - Risk of:  Goal: Will remain free from falls  Description  Will remain free from falls  Outcome: Met This Shift  Goal: Absence of physical injury  Description  Absence of physical injury  Outcome: Met This Shift     Problem: Breathing Pattern - Ineffective:  Goal: Ability to achieve and maintain a regular respiratory rate will improve  Description  Ability to achieve and maintain a regular respiratory rate will improve  Outcome: Met This Shift     Problem: Pain:  Goal: Pain level will decrease  Description  Pain level will decrease  Outcome: Met This Shift

## 2019-04-16 NOTE — PLAN OF CARE
Problem: Falls - Risk of:  Goal: Will remain free from falls  Description  Will remain free from falls  4/16/2019 0141 by Judy Acevedo RN  Outcome: Met This Shift  4/15/2019 1147 by Shantel Collins RN  Outcome: Met This Shift  Goal: Absence of physical injury  Description  Absence of physical injury  4/15/2019 1147 by Shantel Collins RN  Outcome: Met This Shift     Problem: Pain:  Goal: Pain level will decrease  Description  Pain level will decrease  4/16/2019 0141 by Judy Acevedo RN  Outcome: Met This Shift  4/15/2019 1147 by Shantel Collins RN  Outcome: Met This Shift  Goal: Control of acute pain  Description  Control of acute pain  4/15/2019 1147 by Shantel Collins RN  Outcome: Met This Shift  Goal: Control of chronic pain  Description  Control of chronic pain  4/15/2019 1147 by Shantel Collins RN  Outcome: Met This Shift

## 2019-04-17 ENCOUNTER — TELEPHONE (OUTPATIENT)
Dept: FAMILY MEDICINE CLINIC | Age: 64
End: 2019-04-17

## 2019-04-17 LAB
ANION GAP SERPL CALCULATED.3IONS-SCNC: 7 MMOL/L (ref 7–16)
BUN BLDV-MCNC: 31 MG/DL (ref 8–23)
CALCIUM SERPL-MCNC: 9.3 MG/DL (ref 8.6–10.2)
CHLORIDE BLD-SCNC: 96 MMOL/L (ref 98–107)
CO2: 36 MMOL/L (ref 22–29)
CREAT SERPL-MCNC: 1.2 MG/DL (ref 0.5–1)
GFR AFRICAN AMERICAN: 55
GFR NON-AFRICAN AMERICAN: 55 ML/MIN/1.73
GLUCOSE BLD-MCNC: 97 MG/DL (ref 74–99)
METER GLUCOSE: 126 MG/DL (ref 74–99)
METER GLUCOSE: 221 MG/DL (ref 74–99)
METER GLUCOSE: 81 MG/DL (ref 74–99)
POTASSIUM REFLEX MAGNESIUM: 5.7 MMOL/L (ref 3.5–5)
SEDIMENTATION RATE, ERYTHROCYTE: 11 MM/HR (ref 0–20)
SODIUM BLD-SCNC: 139 MMOL/L (ref 132–146)
TROPONIN: <0.01 NG/ML (ref 0–0.03)
TROPONIN: <0.01 NG/ML (ref 0–0.03)

## 2019-04-17 PROCEDURE — 94660 CPAP INITIATION&MGMT: CPT

## 2019-04-17 PROCEDURE — 1200000000 HC SEMI PRIVATE

## 2019-04-17 PROCEDURE — 94640 AIRWAY INHALATION TREATMENT: CPT

## 2019-04-17 PROCEDURE — 82962 GLUCOSE BLOOD TEST: CPT

## 2019-04-17 PROCEDURE — 6370000000 HC RX 637 (ALT 250 FOR IP): Performed by: INTERNAL MEDICINE

## 2019-04-17 PROCEDURE — APPSS30 APP SPLIT SHARED TIME 16-30 MINUTES: Performed by: PHYSICIAN ASSISTANT

## 2019-04-17 PROCEDURE — 85651 RBC SED RATE NONAUTOMATED: CPT

## 2019-04-17 PROCEDURE — 36415 COLL VENOUS BLD VENIPUNCTURE: CPT

## 2019-04-17 PROCEDURE — 2580000003 HC RX 258: Performed by: INTERNAL MEDICINE

## 2019-04-17 PROCEDURE — 6360000002 HC RX W HCPCS: Performed by: CLINICAL NURSE SPECIALIST

## 2019-04-17 PROCEDURE — 97530 THERAPEUTIC ACTIVITIES: CPT | Performed by: OCCUPATIONAL THERAPIST

## 2019-04-17 PROCEDURE — 6360000002 HC RX W HCPCS: Performed by: INTERNAL MEDICINE

## 2019-04-17 PROCEDURE — 2700000000 HC OXYGEN THERAPY PER DAY

## 2019-04-17 PROCEDURE — 6370000000 HC RX 637 (ALT 250 FOR IP): Performed by: NURSE PRACTITIONER

## 2019-04-17 PROCEDURE — 84484 ASSAY OF TROPONIN QUANT: CPT

## 2019-04-17 PROCEDURE — 80048 BASIC METABOLIC PNL TOTAL CA: CPT

## 2019-04-17 PROCEDURE — 93005 ELECTROCARDIOGRAM TRACING: CPT | Performed by: INTERNAL MEDICINE

## 2019-04-17 RX ORDER — SODIUM POLYSTYRENE SULFONATE 15 G/60ML
30 SUSPENSION ORAL; RECTAL ONCE
Status: COMPLETED | OUTPATIENT
Start: 2019-04-17 | End: 2019-04-17

## 2019-04-17 RX ADMIN — OLOPATADINE HYDROCHLORIDE 1 DROP: 2 SOLUTION OPHTHALMIC at 10:09

## 2019-04-17 RX ADMIN — INSULIN LISPRO 4 UNITS: 100 INJECTION, SOLUTION INTRAVENOUS; SUBCUTANEOUS at 17:42

## 2019-04-17 RX ADMIN — URSODIOL 300 MG: 300 CAPSULE ORAL at 10:03

## 2019-04-17 RX ADMIN — GABAPENTIN 600 MG: 300 CAPSULE ORAL at 13:23

## 2019-04-17 RX ADMIN — SODIUM POLYSTYRENE SULFONATE 30 G: 15 SUSPENSION ORAL; RECTAL at 11:50

## 2019-04-17 RX ADMIN — METOPROLOL SUCCINATE 25 MG: 25 TABLET, EXTENDED RELEASE ORAL at 23:43

## 2019-04-17 RX ADMIN — Medication 1 TABLET: at 10:02

## 2019-04-17 RX ADMIN — MICONAZOLE NITRATE: 2 OINTMENT TOPICAL at 10:10

## 2019-04-17 RX ADMIN — ISOSORBIDE MONONITRATE 60 MG: 60 TABLET, EXTENDED RELEASE ORAL at 10:02

## 2019-04-17 RX ADMIN — LEVALBUTEROL HYDROCHLORIDE 0.63 MG: 0.63 SOLUTION RESPIRATORY (INHALATION) at 21:59

## 2019-04-17 RX ADMIN — DULOXETINE HYDROCHLORIDE 60 MG: 60 CAPSULE, DELAYED RELEASE ORAL at 23:44

## 2019-04-17 RX ADMIN — HYDROCODONE BITARTRATE AND ACETAMINOPHEN 1 TABLET: 5; 325 TABLET ORAL at 13:35

## 2019-04-17 RX ADMIN — ENOXAPARIN SODIUM 40 MG: 40 INJECTION SUBCUTANEOUS at 10:14

## 2019-04-17 RX ADMIN — HYDROCODONE BITARTRATE AND ACETAMINOPHEN 1 TABLET: 5; 325 TABLET ORAL at 03:22

## 2019-04-17 RX ADMIN — PREDNISONE 40 MG: 20 TABLET ORAL at 10:04

## 2019-04-17 RX ADMIN — SACUBITRIL AND VALSARTAN 1 TABLET: 24; 26 TABLET, FILM COATED ORAL at 10:03

## 2019-04-17 RX ADMIN — LEVALBUTEROL HYDROCHLORIDE 0.63 MG: 0.63 SOLUTION RESPIRATORY (INHALATION) at 13:34

## 2019-04-17 RX ADMIN — OXYBUTYNIN CHLORIDE 5 MG: 5 TABLET, EXTENDED RELEASE ORAL at 23:44

## 2019-04-17 RX ADMIN — HYDRALAZINE HYDROCHLORIDE 100 MG: 50 TABLET, FILM COATED ORAL at 23:43

## 2019-04-17 RX ADMIN — PANTOPRAZOLE SODIUM 40 MG: 40 TABLET, DELAYED RELEASE ORAL at 06:25

## 2019-04-17 RX ADMIN — METOPROLOL SUCCINATE 25 MG: 25 TABLET, EXTENDED RELEASE ORAL at 10:02

## 2019-04-17 RX ADMIN — ASPIRIN 81 MG 81 MG: 81 TABLET ORAL at 10:04

## 2019-04-17 RX ADMIN — MOMETASONE FUROATE AND FORMOTEROL FUMARATE DIHYDRATE 2 PUFF: 200; 5 AEROSOL RESPIRATORY (INHALATION) at 13:34

## 2019-04-17 RX ADMIN — HYDROCODONE BITARTRATE AND ACETAMINOPHEN 1 TABLET: 5; 325 TABLET ORAL at 23:45

## 2019-04-17 RX ADMIN — ROSUVASTATIN CALCIUM 5 MG: 5 TABLET, FILM COATED ORAL at 10:02

## 2019-04-17 RX ADMIN — BENZONATATE 100 MG: 100 CAPSULE ORAL at 13:23

## 2019-04-17 RX ADMIN — DULOXETINE HYDROCHLORIDE 60 MG: 60 CAPSULE, DELAYED RELEASE ORAL at 10:04

## 2019-04-17 RX ADMIN — Medication 10 ML: at 10:08

## 2019-04-17 RX ADMIN — HYDROCORTISONE: 25 CREAM TOPICAL at 10:14

## 2019-04-17 RX ADMIN — HYDRALAZINE HYDROCHLORIDE 100 MG: 50 TABLET, FILM COATED ORAL at 10:03

## 2019-04-17 RX ADMIN — BACLOFEN 10 MG: 10 TABLET ORAL at 23:44

## 2019-04-17 RX ADMIN — LIDOCAINE: 50 OINTMENT TOPICAL at 23:56

## 2019-04-17 RX ADMIN — LEVALBUTEROL HYDROCHLORIDE 0.63 MG: 0.63 SOLUTION RESPIRATORY (INHALATION) at 01:13

## 2019-04-17 RX ADMIN — BENZONATATE 100 MG: 100 CAPSULE ORAL at 06:25

## 2019-04-17 RX ADMIN — HYDRALAZINE HYDROCHLORIDE 100 MG: 50 TABLET, FILM COATED ORAL at 13:28

## 2019-04-17 RX ADMIN — QUETIAPINE FUMARATE 25 MG: 25 TABLET ORAL at 23:44

## 2019-04-17 RX ADMIN — GABAPENTIN 600 MG: 300 CAPSULE ORAL at 10:03

## 2019-04-17 RX ADMIN — BACLOFEN 10 MG: 10 TABLET ORAL at 10:03

## 2019-04-17 RX ADMIN — CLOTRIMAZOLE AND BETAMETHASONE DIPROPIONATE: 10; .5 CREAM TOPICAL at 10:16

## 2019-04-17 RX ADMIN — LIDOCAINE: 50 OINTMENT TOPICAL at 10:12

## 2019-04-17 RX ADMIN — MOMETASONE FUROATE AND FORMOTEROL FUMARATE DIHYDRATE 2 PUFF: 200; 5 AEROSOL RESPIRATORY (INHALATION) at 21:59

## 2019-04-17 RX ADMIN — MICONAZOLE NITRATE: 20 POWDER TOPICAL at 10:10

## 2019-04-17 RX ADMIN — MONTELUKAST SODIUM 10 MG: 10 TABLET, FILM COATED ORAL at 23:44

## 2019-04-17 RX ADMIN — CLOPIDOGREL BISULFATE 75 MG: 75 TABLET ORAL at 10:03

## 2019-04-17 RX ADMIN — PANTOPRAZOLE SODIUM 40 MG: 40 TABLET, DELAYED RELEASE ORAL at 16:34

## 2019-04-17 RX ADMIN — LEVALBUTEROL HYDROCHLORIDE 0.63 MG: 0.63 SOLUTION RESPIRATORY (INHALATION) at 08:09

## 2019-04-17 ASSESSMENT — PAIN DESCRIPTION - PAIN TYPE
TYPE: ACUTE PAIN

## 2019-04-17 ASSESSMENT — PAIN DESCRIPTION - LOCATION
LOCATION: BACK;HEAD
LOCATION: HEAD
LOCATION: CHEST;NECK

## 2019-04-17 ASSESSMENT — PAIN SCALES - GENERAL
PAINLEVEL_OUTOF10: 10
PAINLEVEL_OUTOF10: 8
PAINLEVEL_OUTOF10: 8
PAINLEVEL_OUTOF10: 0
PAINLEVEL_OUTOF10: 10

## 2019-04-17 ASSESSMENT — PAIN DESCRIPTION - DESCRIPTORS
DESCRIPTORS: ACHING;DISCOMFORT
DESCRIPTORS: ACHING;DISCOMFORT
DESCRIPTORS: STABBING

## 2019-04-17 ASSESSMENT — PAIN DESCRIPTION - PROGRESSION
CLINICAL_PROGRESSION: NOT CHANGED
CLINICAL_PROGRESSION: NOT CHANGED

## 2019-04-17 ASSESSMENT — PAIN DESCRIPTION - ORIENTATION
ORIENTATION: MID
ORIENTATION: MID
ORIENTATION: RIGHT

## 2019-04-17 ASSESSMENT — PAIN - FUNCTIONAL ASSESSMENT
PAIN_FUNCTIONAL_ASSESSMENT: PREVENTS OR INTERFERES SOME ACTIVE ACTIVITIES AND ADLS
PAIN_FUNCTIONAL_ASSESSMENT: PREVENTS OR INTERFERES SOME ACTIVE ACTIVITIES AND ADLS
PAIN_FUNCTIONAL_ASSESSMENT: ACTIVITIES ARE NOT PREVENTED

## 2019-04-17 ASSESSMENT — PAIN DESCRIPTION - ONSET
ONSET: ON-GOING

## 2019-04-17 ASSESSMENT — PAIN DESCRIPTION - FREQUENCY
FREQUENCY: CONTINUOUS
FREQUENCY: CONTINUOUS

## 2019-04-17 NOTE — PATIENT CARE CONFERENCE
[facility] Quality Flow/Interdisciplinary Rounds Progress Note        Quality Flow Rounds held on April 17, 2019    Disciplines Attending:  Bedside Nurse, ,  and Nursing Unit Leadership    Randy Lacey was admitted on 4/10/2019  2:02 PM    Anticipated Discharge Date:  Expected Discharge Date: 04/13/19    Disposition:    Jadon Score:  Jadon Scale Score: 21    Readmission Score:         Discussed patient goal for the day, patient clinical progression, and barriers to discharge.   The following Goal(s) of the Day/Commitment(s) have been identified:  Monitor SpO2, Wean O2, discharge planning       Mann Gonzalez  April 17, 2019

## 2019-04-17 NOTE — PROGRESS NOTES
Patient refused to have us draw labs ordered, Troponin and sed. Rate. IV team consult ordered to draw labs. Patient states she will only allow IV team to draw labs.

## 2019-04-17 NOTE — PLAN OF CARE
Problem: Falls - Risk of:  Goal: Will remain free from falls  Description  Will remain free from falls  4/17/2019 1240 by Israel Cesar RN  Outcome: Met This Shift  4/17/2019 0550 by Taj Forrest RN  Outcome: Met This Shift     Problem: Falls - Risk of:  Goal: Absence of physical injury  Description  Absence of physical injury  4/17/2019 1240 by Israel Cesar RN  Outcome: Met This Shift  4/17/2019 0550 by Taj Forrest RN  Outcome: Met This Shift     Problem: Breathing Pattern - Ineffective:  Goal: Ability to achieve and maintain a regular respiratory rate will improve  Description  Ability to achieve and maintain a regular respiratory rate will improve  4/17/2019 1240 by Israel Cesar RN  Outcome: Met This Shift  4/17/2019 0550 by Taj Forrest RN  Outcome: Met This Shift

## 2019-04-17 NOTE — PROGRESS NOTES
CC/Overnight events:   No complaint of further hemoptysis. + cough with clear to green mucus. Tremor is improved. Did not tolerate BiPAP well last night due to excessive noise after humidifier added. Breathing is comfortable at present. Patient c/o sensation of pine needles in (rectum) when passing stools and requests GI consult - she sees Dr. Brandee Cosme.     Current Facility-Administered Medications   Medication Dose Route Frequency Provider Last Rate Last Dose    predniSONE (DELTASONE) tablet 40 mg  40 mg Oral Daily Madiha Garrison MD   40 mg at 04/17/19 1004    fluticasone (FLONASE) 50 MCG/ACT nasal spray 1 spray  1 spray Each Nare Daily Madiha Garrison MD        levalbuterol [facility]) nebulization 0.63 mg  0.63 mg Nebulization Q6H PATRIZIA Wiggins - CNS   0.63 mg at 04/17/19 1334    sodium chloride (OCEAN, BABY AYR) 0.65 % nasal spray 2 spray  2 spray Each Nare PRN Madiha Garrison MD        miconazole (MICOTIN) 2 % powder   Topical BID Sommer Lockwood MD        HYDROcodone-acetaminophen (NORCO) 5-325 MG per tablet 1 tablet  1 tablet Oral Q6H PRN PATRIZIA Saeed - CNP   1 tablet at 04/17/19 1335    aspirin chewable tablet 81 mg  81 mg Oral Daily Pedrito Tsai MD   81 mg at 04/17/19 1004    baclofen (LIORESAL) tablet 10 mg  10 mg Oral BID Pedrito Tsai MD   10 mg at 04/17/19 1003    clopidogrel (PLAVIX) tablet 75 mg  75 mg Oral Daily Pedrito Tsai MD   75 mg at 04/17/19 1003    clotrimazole-betamethasone (LOTRISONE) cream   Topical BID Pedrito Tsai MD        diphenoxylate-atropine (LOMOTIL) 2.5-0.025 MG per tablet 1 tablet  1 tablet Oral 4x Daily PRN Pedrito Tsai MD        DULoxetine (CYMBALTA) extended release capsule 60 mg  60 mg Oral BID Pedrito Tsai MD   60 mg at 04/17/19 1004    gabapentin (NEURONTIN) capsule 600 mg  600 mg Oral TID Pedrito Tsai MD   600 mg at 04/17/19 1323    hydrALAZINE (APRESOLINE) tablet 100 mg  100 mg Oral hydroxide (MILK OF MAGNESIA) 400 MG/5ML suspension 30 mL  30 mL Oral Daily PRN Aide Mcclendon MD        enoxaparin (LOVENOX) injection 40 mg  40 mg Subcutaneous Daily Aide Mcclendon MD   40 mg at 04/17/19 1014    benzonatate (TESSALON) capsule 100 mg  100 mg Oral Q8H Aide Mcclendon MD   100 mg at 04/17/19 1323    guaiFENesin tablet 400 mg  400 mg Oral Q4H PRN Aide Mcclendon MD   400 mg at 04/11/19 0535    insulin glargine (LANTUS) injection vial 50 Units  50 Units Subcutaneous BID Aide Mcclendon MD   50 Units at 04/16/19 2114    oxybutynin (DITROPAN-XL) extended release tablet 5 mg  5 mg Oral Nightly Aide Mcclendon MD   5 mg at 04/16/19 2110    trimethobenzamide (TIGAN) injection 200 mg  200 mg Intramuscular Q6H PRN Aide Mcclendon MD        miconazole nitrate 2 % ointment   Topical BID Aide Mcclendon MD        olopatadine (PATADAY) 0.2 % ophthalmic solution 1 drop  1 drop Both Eyes Daily Olman Hric, DO   1 drop at 04/17/19 1009    melatonin tablet 1 mg  1 mg Oral Nightly PRN Olman Hric, DO   1 mg at 04/16/19 2122    acetaminophen (TYLENOL) tablet 650 mg  650 mg Oral Q4H PRN Olman Hric, DO   650 mg at 04/15/19 1704    glucose (GLUTOSE) 40 % oral gel 15 g  15 g Oral PRN Omlan Hric, DO        dextrose 50 % solution 12.5 g  12.5 g Intravenous PRN Olman Hric, DO        glucagon (rDNA) injection 1 mg  1 mg Intramuscular PRN Olman Hric, DO        dextrose 5 % solution  100 mL/hr Intravenous PRN Olman Hric, DO           Objective:   /65   Pulse 65   Temp 98 °F (36.7 °C) (Oral)   Resp 18   Ht 5' 2\" (1.575 m)   Wt 248 lb (112.5 kg)   LMP 01/01/1990   SpO2 93%   BMI 45.36 kg/m²     Intake/Output Summary (Last 24 hours) at 4/17/2019 1556  Last data filed at 4/17/2019 1537  Gross per 24 hour   Intake 720 ml   Output 3150 ml   Net -2430 ml       WD obese female - no acute respiratory distress  Skin: warm, dry, without rash  HEENT: PERRL, face symmetric, normal moist oral mucosa,thick neck  Chest: symmetric with equal air entry. Lungs: decreased breath sounds, No wheeze. Few rhonchi which clear with cough  Cardiac: S1, S2 normal.  No murmur or brianda  Abdomen: rounded, soft, non-tender, active bowel sounds. Extremities: No clubbing, cyanosis, or edema  Neuro: CN II - XII grossly intact. Alert, moves all extremities symmetrically. No obvious tremor today. CBC with Differential:    Lab Results   Component Value Date    WBC 10.3 04/16/2019    RBC 4.34 04/16/2019    HGB 13.3 04/16/2019    HCT 43.4 04/16/2019     04/16/2019    .0 04/16/2019    MCH 30.6 04/16/2019    MCHC 30.6 04/16/2019    RDW 13.2 04/16/2019    NRBC 0.0 10/31/2017    SEGSPCT 84 03/03/2014    LYMPHOPCT 15.8 04/10/2019    MONOPCT 4.0 04/10/2019    BASOPCT 0.3 04/10/2019    MONOSABS 0.28 04/10/2019    LYMPHSABS 1.11 04/10/2019    EOSABS 0.14 04/10/2019    BASOSABS 0.02 04/10/2019     CMP:    Lab Results   Component Value Date     04/17/2019    K 5.7 04/17/2019    CL 96 04/17/2019    CO2 36 04/17/2019    BUN 31 04/17/2019    CREATININE 1.2 04/17/2019    GFRAA 55 04/17/2019    LABGLOM 55 04/17/2019    GLUCOSE 97 04/17/2019    GLUCOSE 181 12/01/2011    PROT 7.4 04/10/2019    LABALBU 3.8 04/10/2019    LABALBU 5.2 12/01/2011    CALCIUM 9.3 04/17/2019    BILITOT 0.3 04/10/2019    ALKPHOS 74 04/10/2019    AST 23 04/10/2019    ALT 18 04/10/2019     ABG:    Lab Results   Component Value Date    PH 7.425 04/14/2019    PCO2 49.6 04/14/2019    PO2 87.7 04/14/2019    HCO3 31.8 04/14/2019    BE 6.2 04/14/2019    O2SAT 96.4 04/14/2019         CXR reviewed:   CT SINUS WO CONTRAST   Final Result      NORMAL CT OF THE PARANASAL SINUSES. XR CHEST PORTABLE   Final Result   Correlate clinically for ongoing CHF, fluid overload, etc.   Cannot exclude possible coexisting right basilar pneumonia, with   clinical correlation necessary in this regard also.    Continued follow-up advised. NM LUNG VENT/PERFUSION (VQ)   Final Result   Low probability for pulmonary embolism. Impression:  1. Coronavirus infection - improving  2. Asthma acute exacerbation - improved  3. Hemoptysis now appears resolved  4. DAYAN on BIPAP  5. Rectal pain      Principal Problem:    COPD exacerbation (HCC)  Active Problems:    Chronic combined systolic and diastolic heart failure (HCC)    Coronary artery disease involving native coronary artery of native heart without angina pectoris    Chronic respiratory failure with hypoxia and hypercapnia ([facility])    DAYAN and COPD overlap syndrome ([facility]    Essential hypertension    DM2 (diabetes mellitus, type 2) ([facility]    Morbid obesity due to excess calories ([facility])    Cigarette smoker    Hyperlipidemia    Acute respiratory failure with hypoxia ([facility])    Upper respiratory tract infection    Controlled type 2 diabetes mellitus without complication ([facility])    Coronavirus infection    E. coli UTI  Resolved Problems:    * No resolved hospital problems. *      Plans:   1. Oral steroids (tolerated so far)  2. Continue nebs  3. Oxygen to maintain SpO2 90 to 96%  4. GI consult per patient request  5.  Home soon    Aaron Lockett MD, FACP, [facility]

## 2019-04-17 NOTE — PLAN OF CARE
Problem: Falls - Risk of:  Goal: Will remain free from falls  Description  Will remain free from falls  4/17/2019 0550 by Warren Crenshaw RN  Outcome: Met This Shift  4/16/2019 1823 by Sonali Sood RN  Outcome: Met This Shift  Goal: Absence of physical injury  Description  Absence of physical injury  4/17/2019 0550 by Warren Crenshaw RN  Outcome: Met This Shift  4/16/2019 1823 by Sonali Sood RN  Outcome: Met This Shift

## 2019-04-17 NOTE — PROGRESS NOTES
BID    oxybutynin  5 mg Oral Nightly    miconazole nitrate   Topical BID    olopatadine  1 drop Both Eyes Daily       sodium chloride 2 spray PRN   HYDROcodone 5 mg - acetaminophen 1 tablet Q6H PRN   diphenoxylate-atropine 1 tablet 4x Daily PRN   lidocaine  PRN   sodium chloride flush 10 mL PRN   magnesium hydroxide 30 mL Daily PRN   guaiFENesin 400 mg Q4H PRN   trimethobenzamide 200 mg Q6H PRN   melatonin 1 mg Nightly PRN   acetaminophen 650 mg Q4H PRN   glucose 15 g PRN   dextrose 12.5 g PRN   glucagon (rDNA) 1 mg PRN   dextrose 100 mL/hr PRN        Objective:    /65   Pulse 65   Temp 98 °F (36.7 °C) (Oral)   Resp 18   Ht 5' 2\" (1.575 m)   Wt 248 lb (112.5 kg)   LMP 01/01/1990   SpO2 93%   BMI 45.36 kg/m²   General Appearance: alert and oriented to person, place and time, well-developed and well-nourished, in no acute distress  Skin: warm and dry, no rash or erythema  Head: normocephalic and atraumatic  Eyes: extraocular eye movements intact and conjunctivae normal  ENT: hearing grossly normal bilaterally  Neck: neck supple and non tender without mass and no thyromegaly   Pulmonary/Chest: FREDERICK rhonchi   Cardiovascular: normal rate, regular rhythm, normal S1 and S2, no murmurs, no gallops, no JVD and Chest pain reproducible on exam   Abdomen: soft, non-tender, non-distended, normal bowel sounds, no masses or organomegaly      Recent Labs     04/15/19  0545 04/16/19  0330 04/17/19  0453    138 139   K 4.8 5.0 5.7*   CL 95* 96* 96*   CO2 34* 35* 36*   BUN 36* 31* 31*   CREATININE 1.3* 1.3* 1.2*   GLUCOSE 132* 99 97   CALCIUM 9.4 9.2 9.3       Recent Labs     04/16/19  0330   WBC 10.3   RBC 4.34   HGB 13.3   HCT 43.4   .0*   MCH 30.6   MCHC 30.6*   RDW 13.2      MPV 10.1       Radiology:   CT SINUS WO CONTRAST   Final Result      NORMAL CT OF THE PARANASAL SINUSES.           XR CHEST PORTABLE   Final Result   Correlate clinically for ongoing CHF, fluid overload, etc.   Cannot exclude possible coexisting right basilar pneumonia, with   clinical correlation necessary in this regard also. Continued follow-up advised. NM LUNG VENT/PERFUSION (VQ)   Final Result   Low probability for pulmonary embolism. Assessment:    Principal Problem:    COPD exacerbation (HCC)  Active Problems:    Chronic combined systolic and diastolic heart failure (HCC)    Coronary artery disease involving native coronary artery of native heart without angina pectoris    Chronic respiratory failure with hypoxia and hypercapnia (HCC)    DAYAN and COPD overlap syndrome (HCC)    Essential hypertension    DM2 (diabetes mellitus, type 2) ([facility]    Morbid obesity due to excess calories ([facility])    Cigarette smoker    Hyperlipidemia    Acute respiratory failure with hypoxia (HCC)    Upper respiratory tract infection    Controlled type 2 diabetes mellitus without complication ([facility])    Coronavirus infection    E. coli UTI  Resolved Problems:    * No resolved hospital problems. *      Plan:  1. Acute exacerbation of asthma   - positive respiratory panel, coronavirus  - Pulm following   - Will repeat chest x-ray   - Steroids switched to PO today, Azithromycin stopped by pulm d/t procalcitonin level     2. Chronic Hypoxic respiratory failure   - Currently on 4L NC, does not use O2 at home      3. UTI   - Continue Ceftriaxone      4. ALEC  - Bumex on hold, Continue   - Encouraged oral intake      5. Chronic systolic and diastolic heart failure      6. HTN:   - Stable, Continue      7. DM:   - Fasting glucose 126  - continue insulin ss     8. HLD:   - Continue Crestor      9. Coronavirus:  - Continue supportive treatment     10. Headache/ Sinus pressure   - CT of sinuses ordered by Pulm, no acute findings    11. Hyperkalemia  - K 5.7 today, Pt was given KX    12. Chest pain   - EKG done, Dr. Nasir Gonzalez reviewed  - R sided chest pain that is reproducible on exam  - Will order Troponin, ESR continue to monitor    13.  Pain with Defecation  - I believe this I likely related to a fissure  - GI was consulted      Electronically signed by Troy Santiago PA-C on 4/17/2019 at 3:59 PM

## 2019-04-17 NOTE — PROGRESS NOTES
OCCUPATIONAL THERAPY DAILY NOTE    Date:2019  Patient Name: Rachelle Conde  MRN: [id number]  : 1955  Room: [address] [place] [address]-A     Patient Active Problem List   Diagnosis    Morbid obesity due to excess calories ([facility]    Hyperlipidemia    DAYAN and COPD overlap syndrome ([facility]    Vitamin D insufficiency    Chronic combined systolic and diastolic heart failure ([facility]    Essential hypertension    GERD (gastroesophageal reflux disease)    Major depressive disorder, recurrent episode, mild ([facility]    Diabetic polyneuropathy associated with type 2 diabetes mellitus (HCC)    Chronic passive hepatic congestion    Mixed incontinence urge and stress    Chronic back pain - d/t muscle spasm    Glaucoma, open angle    Elevated CA 19-9 level    Lumbar stenosis    Spondylosis 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    Lumbar disc herniation    [facility]ymmetric septal hypertrophy ([facility]    Non-compliance    Hiatal hernia    Melanosis coli    Coronary artery disease involving native coronary artery of native heart without angina pectoris    DM2 (diabetes mellitus, type 2) ([place] [address]    Cigarette smoker    Marijuana use, smoked    Ischemic cardiomyopathy    PVD (peripheral vascular disease) ([facility]    Chronic venous insufficiency    History of non-ST elevation myocardial infarction (NSTEMI)    QT prolongation    Atherosclerosis of native arteries of extremities with rest pain, left leg (HCC)    Cerebellar infarct (HCC)    COPD exacerbation (HCC)    Chronic respiratory failure with hypoxia and hypercapnia (HCC)    Acute respiratory failure with hypoxia (HCC)    Upper respiratory tract infection    Controlled type 2 diabetes mellitus without complication (HCC)    Coronavirus infection    E. coli UTI       Pain Level: None stated    Subjective: Pt reports she didn't sleep well last night d/t her Bipap machine being loud. Otherwise she is feeling well.   Precautions: Fall risk, O2, [place]   Ind Sup CGA Min A Mod A Max A Dep   Feeding  x         Grooming   x        UE Bathing           LE Bathing           UE Dressing   x        LE  Dressing       x    Toileting           Comments: Pt required max assistance to don/doff socks. She does not have AE, but was inquiring about a sockaide. Transfers: Supervision for all transfers and functional mobility using a [employer]. Typically she does not use a nasal canula and required safety training in room with nasal canula in use. Education: Pt educated on energy conservation techniques and overall safety during functional mobility/transers. Additional Notes: Pt completed UB exercises including biceps, triceps, and lat pulls x 15 reps with min fatigue. She demo'd a 7 minute standing tolerance without much c/o fatigue. O2 was taken after exercise and was 97%. Pt inquired about sockaide for LB dressing independence. Informed her we will attempt to find one and train her on use. She does have an aide that comes to her house every day for 6 hours that helps her with ADLs and IADLs. Recommended Discharge:   [x] Continue with current OT Plan of care.   [] Prepare for Discharge       Recommended DME: Carlene, [address]  MOT, OTR/L [id number]    Total Tx Time: 20 minutes

## 2019-04-17 NOTE — PROGRESS NOTES
Date: 4/16/2019    Time: 10:29 PM    Patient Placed On BIPAP/CPAP/ Non-Invasive Ventilation? Yes    If no must comment. Facial area red/color change? No           If YES are Blister/Lesion present? No   If yes must notify nursing staff  BIPAP/CPAP skin barrier?   Yes    Skin barrier type:Liquicel       Comments:        Jose Carlos Jade

## 2019-04-18 ENCOUNTER — APPOINTMENT (OUTPATIENT)
Dept: GENERAL RADIOLOGY | Age: 64
DRG: 202 | End: 2019-04-18
Payer: MEDICARE

## 2019-04-18 ENCOUNTER — APPOINTMENT (OUTPATIENT)
Dept: CT IMAGING | Age: 64
DRG: 202 | End: 2019-04-18
Payer: MEDICARE

## 2019-04-18 VITALS
OXYGEN SATURATION: 92 % | HEIGHT: 62 IN | DIASTOLIC BLOOD PRESSURE: 78 MMHG | WEIGHT: 247.44 LBS | RESPIRATION RATE: 18 BRPM | SYSTOLIC BLOOD PRESSURE: 102 MMHG | BODY MASS INDEX: 45.54 KG/M2 | HEART RATE: 62 BPM | TEMPERATURE: 98.2 F

## 2019-04-18 LAB
ANION GAP SERPL CALCULATED.3IONS-SCNC: 8 MMOL/L (ref 7–16)
BUN BLDV-MCNC: 28 MG/DL (ref 8–23)
CALCIUM SERPL-MCNC: 8.7 MG/DL (ref 8.6–10.2)
CHLORIDE BLD-SCNC: 95 MMOL/L (ref 98–107)
CO2: 34 MMOL/L (ref 22–29)
CREAT SERPL-MCNC: 1.1 MG/DL (ref 0.5–1)
GFR AFRICAN AMERICAN: >60
GFR NON-AFRICAN AMERICAN: >60 ML/MIN/1.73
GLUCOSE BLD-MCNC: 203 MG/DL (ref 74–99)
METER GLUCOSE: 128 MG/DL (ref 74–99)
METER GLUCOSE: 130 MG/DL (ref 74–99)
METER GLUCOSE: 176 MG/DL (ref 74–99)
METER GLUCOSE: 203 MG/DL (ref 74–99)
POTASSIUM REFLEX MAGNESIUM: 4.4 MMOL/L (ref 3.5–5)
SODIUM BLD-SCNC: 137 MMOL/L (ref 132–146)
TROPONIN: <0.01 NG/ML (ref 0–0.03)

## 2019-04-18 PROCEDURE — 80048 BASIC METABOLIC PNL TOTAL CA: CPT

## 2019-04-18 PROCEDURE — 6370000000 HC RX 637 (ALT 250 FOR IP): Performed by: INTERNAL MEDICINE

## 2019-04-18 PROCEDURE — 97535 SELF CARE MNGMENT TRAINING: CPT

## 2019-04-18 PROCEDURE — 84484 ASSAY OF TROPONIN QUANT: CPT

## 2019-04-18 PROCEDURE — APPSS45 APP SPLIT SHARED TIME 31-45 MINUTES: Performed by: PHYSICIAN ASSISTANT

## 2019-04-18 PROCEDURE — 2580000003 HC RX 258: Performed by: INTERNAL MEDICINE

## 2019-04-18 PROCEDURE — 2700000000 HC OXYGEN THERAPY PER DAY

## 2019-04-18 PROCEDURE — 6370000000 HC RX 637 (ALT 250 FOR IP): Performed by: CLINICAL NURSE SPECIALIST

## 2019-04-18 PROCEDURE — 6370000000 HC RX 637 (ALT 250 FOR IP): Performed by: NURSE PRACTITIONER

## 2019-04-18 PROCEDURE — 6360000002 HC RX W HCPCS: Performed by: CLINICAL NURSE SPECIALIST

## 2019-04-18 PROCEDURE — 36415 COLL VENOUS BLD VENIPUNCTURE: CPT

## 2019-04-18 PROCEDURE — 6360000004 HC RX CONTRAST MEDICATION: Performed by: RADIOLOGY

## 2019-04-18 PROCEDURE — 82962 GLUCOSE BLOOD TEST: CPT

## 2019-04-18 PROCEDURE — 6360000002 HC RX W HCPCS: Performed by: INTERNAL MEDICINE

## 2019-04-18 PROCEDURE — 99239 HOSP IP/OBS DSCHRG MGMT >30: CPT | Performed by: INTERNAL MEDICINE

## 2019-04-18 PROCEDURE — 71046 X-RAY EXAM CHEST 2 VIEWS: CPT

## 2019-04-18 PROCEDURE — 97530 THERAPEUTIC ACTIVITIES: CPT

## 2019-04-18 PROCEDURE — 94660 CPAP INITIATION&MGMT: CPT

## 2019-04-18 PROCEDURE — 74176 CT ABD & PELVIS W/O CONTRAST: CPT

## 2019-04-18 PROCEDURE — 94640 AIRWAY INHALATION TREATMENT: CPT

## 2019-04-18 RX ORDER — PREDNISONE 10 MG/1
TABLET ORAL
Qty: 10 TABLET | Refills: 0 | Status: SHIPPED | OUTPATIENT
Start: 2019-04-19 | End: 2019-04-23

## 2019-04-18 RX ADMIN — URSODIOL 300 MG: 300 CAPSULE ORAL at 00:05

## 2019-04-18 RX ADMIN — INSULIN GLARGINE 50 UNITS: 100 INJECTION, SOLUTION SUBCUTANEOUS at 00:02

## 2019-04-18 RX ADMIN — GABAPENTIN 600 MG: 300 CAPSULE ORAL at 09:34

## 2019-04-18 RX ADMIN — BENZONATATE 100 MG: 100 CAPSULE ORAL at 00:04

## 2019-04-18 RX ADMIN — INSULIN LISPRO 4 UNITS: 100 INJECTION, SOLUTION INTRAVENOUS; SUBCUTANEOUS at 09:37

## 2019-04-18 RX ADMIN — Medication 10 ML: at 00:10

## 2019-04-18 RX ADMIN — MICONAZOLE NITRATE: 20 POWDER TOPICAL at 00:10

## 2019-04-18 RX ADMIN — SACUBITRIL AND VALSARTAN 1 TABLET: 24; 26 TABLET, FILM COATED ORAL at 00:11

## 2019-04-18 RX ADMIN — SACUBITRIL AND VALSARTAN 1 TABLET: 24; 26 TABLET, FILM COATED ORAL at 09:35

## 2019-04-18 RX ADMIN — PREDNISONE 40 MG: 20 TABLET ORAL at 09:35

## 2019-04-18 RX ADMIN — LEVALBUTEROL HYDROCHLORIDE 0.63 MG: 0.63 SOLUTION RESPIRATORY (INHALATION) at 00:47

## 2019-04-18 RX ADMIN — CLOTRIMAZOLE AND BETAMETHASONE DIPROPIONATE: 10; .5 CREAM TOPICAL at 09:36

## 2019-04-18 RX ADMIN — HYDROCORTISONE: 25 CREAM TOPICAL at 09:36

## 2019-04-18 RX ADMIN — MICONAZOLE NITRATE: 2 OINTMENT TOPICAL at 09:37

## 2019-04-18 RX ADMIN — URSODIOL 300 MG: 300 CAPSULE ORAL at 09:35

## 2019-04-18 RX ADMIN — BENZONATATE 100 MG: 100 CAPSULE ORAL at 06:33

## 2019-04-18 RX ADMIN — DULOXETINE HYDROCHLORIDE 60 MG: 60 CAPSULE, DELAYED RELEASE ORAL at 09:35

## 2019-04-18 RX ADMIN — INSULIN LISPRO 2 UNITS: 100 INJECTION, SOLUTION INTRAVENOUS; SUBCUTANEOUS at 00:02

## 2019-04-18 RX ADMIN — ASPIRIN 81 MG 81 MG: 81 TABLET ORAL at 09:35

## 2019-04-18 RX ADMIN — OLOPATADINE HYDROCHLORIDE 1 DROP: 2 SOLUTION OPHTHALMIC at 09:37

## 2019-04-18 RX ADMIN — HYDROCORTISONE ACETATE PRAMOXINE HCL: 1; 1 CREAM TOPICAL at 14:41

## 2019-04-18 RX ADMIN — BACLOFEN 10 MG: 10 TABLET ORAL at 09:35

## 2019-04-18 RX ADMIN — ISOSORBIDE MONONITRATE 60 MG: 60 TABLET, EXTENDED RELEASE ORAL at 09:34

## 2019-04-18 RX ADMIN — HYDRALAZINE HYDROCHLORIDE 100 MG: 50 TABLET, FILM COATED ORAL at 14:41

## 2019-04-18 RX ADMIN — PANTOPRAZOLE SODIUM 40 MG: 40 TABLET, DELAYED RELEASE ORAL at 06:33

## 2019-04-18 RX ADMIN — HYDRALAZINE HYDROCHLORIDE 100 MG: 50 TABLET, FILM COATED ORAL at 09:34

## 2019-04-18 RX ADMIN — Medication 1 MG: at 00:04

## 2019-04-18 RX ADMIN — MICONAZOLE NITRATE: 20 POWDER TOPICAL at 09:36

## 2019-04-18 RX ADMIN — INSULIN GLARGINE 50 UNITS: 100 INJECTION, SOLUTION SUBCUTANEOUS at 09:38

## 2019-04-18 RX ADMIN — METOPROLOL SUCCINATE 25 MG: 25 TABLET, EXTENDED RELEASE ORAL at 09:35

## 2019-04-18 RX ADMIN — ENOXAPARIN SODIUM 40 MG: 40 INJECTION SUBCUTANEOUS at 09:35

## 2019-04-18 RX ADMIN — IOHEXOL 50 ML: 240 INJECTION, SOLUTION INTRATHECAL; INTRAVASCULAR; INTRAVENOUS; ORAL at 14:13

## 2019-04-18 RX ADMIN — GABAPENTIN 600 MG: 300 CAPSULE ORAL at 00:05

## 2019-04-18 RX ADMIN — Medication 1 TABLET: at 09:34

## 2019-04-18 RX ADMIN — GABAPENTIN 600 MG: 300 CAPSULE ORAL at 14:41

## 2019-04-18 RX ADMIN — ROSUVASTATIN CALCIUM 5 MG: 5 TABLET, FILM COATED ORAL at 09:35

## 2019-04-18 RX ADMIN — SALINE NASAL SPRAY 2 SPRAY: 1.5 SOLUTION NASAL at 09:36

## 2019-04-18 RX ADMIN — HYDROCODONE BITARTRATE AND ACETAMINOPHEN 1 TABLET: 5; 325 TABLET ORAL at 11:19

## 2019-04-18 RX ADMIN — Medication 10 ML: at 09:41

## 2019-04-18 RX ADMIN — CLOPIDOGREL BISULFATE 75 MG: 75 TABLET ORAL at 09:35

## 2019-04-18 ASSESSMENT — PAIN SCALES - GENERAL
PAINLEVEL_OUTOF10: 0
PAINLEVEL_OUTOF10: 6
PAINLEVEL_OUTOF10: 0
PAINLEVEL_OUTOF10: 4

## 2019-04-18 ASSESSMENT — PAIN DESCRIPTION - ORIENTATION: ORIENTATION: MID

## 2019-04-18 ASSESSMENT — PAIN DESCRIPTION - LOCATION: LOCATION: BACK;HEAD

## 2019-04-18 ASSESSMENT — PAIN DESCRIPTION - ONSET: ONSET: ON-GOING

## 2019-04-18 ASSESSMENT — PAIN DESCRIPTION - FREQUENCY: FREQUENCY: CONTINUOUS

## 2019-04-18 ASSESSMENT — PAIN DESCRIPTION - PAIN TYPE: TYPE: ACUTE PAIN

## 2019-04-18 ASSESSMENT — PAIN - FUNCTIONAL ASSESSMENT: PAIN_FUNCTIONAL_ASSESSMENT: PREVENTS OR INTERFERES SOME ACTIVE ACTIVITIES AND ADLS

## 2019-04-18 ASSESSMENT — PAIN DESCRIPTION - DESCRIPTORS: DESCRIPTORS: ACHING;DISCOMFORT

## 2019-04-18 NOTE — PROGRESS NOTES
Type and Reason for Visit: Initial, RD Nutrition Re-Screen(LOS Assessment, RD Re-Screen Negative)    Nutrition Screen:   · Have you recently lost weight without trying? - 0 to 1 pound (0 points)   · Have you been eating poorly because of a decreased appetite? - No (0 points)   · Malnutrition Screening Tool Score - 0    Dietitian Assessment of Nutrition Re-Screen: Pt assessed per LOS protocol. Currently eating ~% of meals and w/ no noted nutritional issues at this time. Will follow per policy.           Electronically signed by Elie Redman RD, ROHAN on 4/18/19 at 12:44 PM    Contact Number: ext [phone]

## 2019-04-18 NOTE — PROGRESS NOTES
Physical Therapy  Facility/Department: [facility]  Daily Treatment Note  NAME: Korene Angelucci  : 1955  MRN: [id number]    Date of Service: 2019    Patient Diagnosis(es): The encounter diagnosis was COPD exacerbation ([address] has a past medical history of Asthma, Atherosclerosis of native artery of left leg with rest pain ([address] C. difficile diarrhea, CAD (coronary artery disease), Cellulitis and abscess of trunk, Cerebellar infarct (HCC), Chronic back pain, Chronic kidney disease, Chronic systolic congestive heart failure ([address] Chronic venous insufficiency, COPD (chronic obstructive pulmonary disease) ([address] Depression, Diabetes mellitus ([address] Diabetic neuropathy ([address] Diverticulosis, Fatty liver, Glaucoma, open angle, Hepatic encephalopathy ([address] Hiatal hernia, History of blood transfusion, Hyperlipidemia, Hyperplastic colon polyp, Hypertension, Incontinence, Liver mass, Morbid obesity ([address] Movement disorder, Myocardial infarction ([address] Osteoarthritis, generalized, Pain in both lower legs, Peripheral vascular disease ([address] Pneumonia, Pulmonary edema, PVD (peripheral vascular disease) with claudication ([address] Sleep apnea, Tobacco abuse, Tobacco abuse, Tubular adenoma polyp of rectum, Urinary tract infection due to ESBL Klebsiella, and Ventral hernia. has a past surgical history that includes knee surgery; Upper gastrointestinal endoscopy (12); Coronary artery bypass graft; layer wound closure (Left, [id number]); Echo Complete (10/9/2013); polysomnography (2016); liver biopsy (2016); bronchial brush biopsy (2013); Breast surgery (); Cholecystectomy (YRS AGO); Cardiac surgery (2011); Cardiac catheterization (2015); Hysterectomy; joint replacement (); Upper gastrointestinal endoscopy (2016); Colonoscopy (11/15/2013); Colonoscopy (2016); Upper gastrointestinal endoscopy (2017); Endoscopy, colon, diagnostic (2017);  Gallbladder surgery; and angioplasty (11/13/2018). Evaluating Therapist: Eduin Ortega. Nasir Baer P.T.        Room #: 7983/0533-Z  DIAGNOSIS: COPD exacerbation, SOB  PRECAUTIONS: falls     Social:  Pt lives alone in a 1 floor plan 2 steps and 2 rails to enter. Prior to admission pt walked with [facility] in the house and ww in the community                Initial Evaluation  Date: 4/16/19 Treatment    4/18  Short Term/ Long Term   Goals   Was pt agreeable to Eval/treatment? yes yes      Does pt have pain? No c/o pain Back pain and headache      Bed Mobility  NA, pt was found and left sitting up in chair  independent Independent   Transfers Sit to stand: supervision  Stand to sit: supervision  Stand pivot: supervision Sit to stand: independent  Stand to sit: independent  Stand pivot: independen Independent   Ambulation    200 feet with ww with supervision with slow gait speed 15 feet and 200 feet with w/w Supervision 350 feet with ww Independent   Stair negotiation: ascended and descended NA NT 2 steps with 1 rail Independent   AM-PAC Raw Score  20/24 22/24          Balance: sitting EOB independent, standing with w/w Supervision. Patient education  Pt was educated on PT objectives during treatment session. Patient response to education:   Pt verbalized understanding Pt demonstrated skill Pt requires further education in this area       yes     Additional Comments: Pt found in bed. No report of dizziness during functional mobility. No LOB during ambulation. Pulse ox after ambulation on 2L 98%. Pt was left in bed with nursing present and with call light left by patient. Chair/bed alarm: yes    Time in: 1155  Time out: 1210    Pt is making good progress toward established Physical Therapy goals. Continue with physical therapy current plan of care.     Levi Chua, PT  License Number: [id number]

## 2019-04-18 NOTE — CONSULTS
Consults     Gastroenterology Consult Note   Jazminchata Elinor [facility] with Jennifer Ball M.D. Consult Note        Date of Service: 4/18/2019  Reason for Consult: Rectal pain.  Patient known to your service  Requesting Physician: Dr Florence Leung:  Rectal pain    History Obtained From:  patient, electronic medical record    HISTORY OF PRESENT ILLNESS:       Heidy Dunn is a 58 y.o. female with significant past medical history of  Liver mass right hepatic lobe s/p partial hepatectomy per CT scan but pt denies; BENOIT; Melanosis Coli; GERD; CBD Stricture s/p ERCP/EUS at [facility] with stent removal - cells atypia with brushing; Hiatal Hernia; Anemia; Morbid Obesity; Diverticulosis; CAD; CKD; COPD; HLD; HTN; as well as those listed below admitted via ED for shortness of breath and hemoptysis on 4/10/19. Pulmonary following. Pt reports she has had intermittent sharp rectal pain radiating to her abdomen rated 9/10 for some time now, states she called Dr. Friedman All office and was told to go to ED for evaluation. Patient reports in the interim she developed hemoptysis with some shortness of breath, went to ED and was admitted. Since her admission she is having increasing pain in the rectum and states she requested GI consultation. She reports dark stools but states they are dark brown and daily. Patient reports chills with fever of 101°F prior to admission. . Patient denies nausea, vomiting, hematemesis, hematochezia, melena/black tarry stools, or weight loss. Admission labs: K+ 6.5; , otherwise admission labs normal. Respiratory panel showed coronavirus. Consultation for Rectal pain.  Patient known to your service. Pt is well known to Dr. Bartolo Dolan, last seen on 3/11/19. She was to have EGD as outpatient. Pt had last Colonoscopy done 10/31/18 which showed diffuse diverticulosis and changes consistent with ischemic colitis versus diverticulitis. Colon, random biopsies: No significant histopathologic abnormality. Negative for evidence of colitis or neoplasia. Colonoscopy 11/15/13 with Dr Daniel Vazquez showed Overall thickened colonic mucosa with several hyperplastic appearing changes throughout the colon B. Colon, transverse, biopsy- Hyperplastic polyp. Fronie Bowl, transverse/splenic flexure, biopsy-  Hyperplastic  Polyp. [place], rectum, biopsy-  No significant pathologic change. EGD 2/6/17 with Dr Otoniel Rocha demonstrated Grade B LA GERD; small hiatal hernia; gastritis. Rectal bx 4/11/18 retrieved in EPIC- op report not found - Rectum, polypectomy x3: Inflammatory and hyperplastic polyp fragments. ERCP with pancreatic stent and brushing with Dr Otoniel Rocha 4/17/17 showed Difficult ERCP. Pancreatic duct appeared unremarkable and pancreatic stent was placed to prevent post-procedure pancreatitis. Common bile duct showed mild dilatation in the proximal hepatic duct. Intrahepatic ductal and system appeared with stricture and dilatation. A picture corresponding with possible sclerosing cholangitis. However, the central common hepatic duct area appeared with a small filling defect concerning for possible neoplasm. Brushing for cytology was done and Negative. EGD PD stent removal 4/18/17 showed spontaneous expulsion of PD stent. Pt has followed at [facility] on several occasions. She was seen initially at [facility] (formerly [facility]] on 9/27/2011 by Dr. Aruna Grajeda which per Care Everywhere, Upper EUS: Indications: Common bile duct stricture found on ERCP, MRI of pancreas was negative. Patient reports not benig jaundice. Metal stent was placed. Ctyology negative. S/P ERCP with papillotomy and CBD Wallstent placed for CBD stricture and progressive upper abdominal pain 1 yr post nic in 2011. She had elevated CA 19-9. Per Care Everywhere note by Rocky Babb MD.  An MRCP showed dilated bile duct without any focal strictures. However, the patient's CA 19-9 was elevated. Her liver tests showed she has not been jaundiced.  Brushings were obtained from the mid CBD stricture, which were negative. A metal stent was placed at the time of the index ERCP. An EUS did not show an obvious pancreatic mass. This ERCP w/retrograde removal of stent/tube and endoscopic cannulation of papilla w/direct visualization of PD and bx was performed to remove the metal stent and imaged the ductal system with a Spyglass visualization catheter. \"  Per Dr. Farhan Forte procedure note from 10/18/11 in Care Everywhere: \"The major papilla had a metal stent protruding from its orifice. At least half a stent was protruding out of the papilla. I encircled the tip of the stent with a wire snare and removed the stent with withdrawal of the endoscope. The endoscope was then reinserted. The major papilla had the appearance of previous sphincterotomy. There was excellent drainage of bile. A 9-12 mm biliary balloon catheter was inserted through the major papilla into the hepatic hilum and a cholangiogram was obtained using Ultravist (dye allergy) contrast. There was mild bilateral intrahepatic biliary duct dilatation as well as extrahepatic biliary ductal dilatation. There was a slight narrowing at the end of the duct as inserted in its intrapancreatic portion, but this was a smooth narrowing, which is within normal limits. There was a slight tortuosity at the level of the mid common bile duct, but a focal stricture was not appreciated on standard cholangiography. The cystic duct stump opacified and the insertion of the stump was at the level of the mid CBD. There was no extravasation of contrast. Brushings were obtained from the mid CBD (after Spyglass exam). There was excellent drainage of contrast and bile on standard cholangiography. The balloon catheter was removed and Spyglass visualization catheter was inserted under direct vision all the way to the hepatic hilum and the extrahepatic biliary tree was imaged. The common hepatic duct appeared normal as did the hilum with normal appearing mucosa.  There was slight focal erythema scattered throughout the distal common hepatic, which is most likely secondary to the lie of the previously placed metal stent. The common bile duct appeared normal cholangiography, although at the level of the mid to distal common bile duct, there was evidence of sloughing of biliary mucosa and moderate erythema without evidence of an infiltrative type neoplasm. The overall appearance of this area was suggestive of stent related biliary epithelium changes. Direct targeted biopsies were obtained from this area. Two more passes were made through the common bile duct and common hepatic duct with Spyglass visualization catheter and a distinct focal lesion was not seen. The catheter was removed. Because of these findings and an excellent drainage of bile and contrast, a stent was not placed. \"  Bx results per Care Everywhere: A.  designated bile duct epithelium Garret 1: degenerating fibrous material with heavy acute inflammation and degenerating clusters of epithelial cells, not further diagnostic. Focal budding yeast with pseudohyphae and bacterial micro-organisms present. B. Designated bile duct epithelium Garret 2: predominantly macrophages with rare degenerating epithelial fragments and inflammation not further diagnostic. C.  Designated common bile duct stent, removal: intact metallic stent device, clinically removed from CBD. A. Bile duct, brushing: Satisfactory for interpretation. Negative for malignant cells. Comment: Reactive atypia noted. Colonoscopy with hot snare polypectomy ascending and transverse colon, forceps biopsies sigmoid colon and EGD 12/23/16 with Dr Castro Landers showed multiple polyps in transverse and ascending colon and hyperplastic appearing polyps in sigmoid colon. A. Ascending colon, biopsy: Tubular adenoma B.  Transverse colon, biopsy: Fragments of hyperplastic polyp and tubular adenoma C.  Sigmoid colon, biopsy: Fragments of hyperplastic polyp. Pt had EGD 12/20/12 with Dr Castro Landers which demonstrated Sliding tiny 1 cm hiatal hernia. Reactive change around the GE junction, questionable Alcocer esophagus. Gastritis.  A.  Stomach, antrum, biopsy- Chronic active gastritis with reactive epithelial changes and fibrinopurulent inflammatory exudate (see comment). Immunostain for H. pylori organisms is negative. Elena Burgos, gastroesophageal junction, biopsy-  Chronic gastroesophagitis, mild (see comment). Immunostain for H. pylori organisms is negative. Currently, pt reports she continues to have pain \"in the rectum\" rated 6/10 associated with abd cramping. Pt denies change in appetite. States last BM was today. Labs today: ; Cl 95; CO2 34; BUN 28; Cr 1.1.     Past Medical History:        Diagnosis Date    Asthma     Atherosclerosis of native artery of left leg with rest pain ([address] 11/9/2018    C. difficile diarrhea 2015    CAD (coronary artery disease) 2011    Cellulitis and abscess of trunk 4/14/2015    Cerebellar infarct ([address] 4/3/2019    Remote, rt. cerebellum, head CT scan, 1/9/19    Chronic back pain     Chronic kidney disease     Chronic systolic congestive heart failure ([address] 10/4/2013    Chronic venous insufficiency 10/11/2017    COPD (chronic obstructive pulmonary disease) ([address]     Depression     Diabetes mellitus ([address]     Diabetic neuropathy ([address]     Diverticulosis     Fatty liver 1/8/2016    per US    Glaucoma, open angle 2/1/2016    Mild-OU    Hepatic encephalopathy ([address] 02/07/2016    resolved    Hiatal hernia     History of blood transfusion     Hyperlipidemia     Hyperplastic colon polyp     Hypertension     Incontinence     Liver mass     Morbid obesity ([address]     Movement disorder     Myocardial infarction ([address] 2011    Osteoarthritis, generalized     Pain in both lower legs 10/11/2017    Peripheral vascular disease ([address]     Pneumonia 1/26/2014    Pulmonary edema     resolved    PVD (peripheral vascular disease) with claudication ([address] 8/30/2017    Sleep apnea     uses BI PAP    Tobacco abuse     Tobacco abuse 10/11/2017    Tubular adenoma polyp of rectum     Urinary tract infection due to ESBL Klebsiella 03/08/2017    Ventral hernia      Past Surgical History:        Procedure Laterality Date    ANGIOPLASTY  11/13/2018    Dr. Jonatan Sandoval & athrectomy L SFA & Popliteal    BREAST SURGERY  2000    bilateral reduction    BRONCHIAL BRUSH BIOPSY  1/25/2013    Dr Anabel Llanos  04/20/2015    Dr. Tawana Arauz  12/2011     DR. Yamini Cui,  follows Dr Shannon Fatima  11/15/2013    Dr Mackenzie Valdez    COLONOSCOPY  12/23/2016    Dr Javi Nino adenoma & hyperplastic polyps, melanosis coli (repeat one year 12/2017)    CORONARY ARTERY BYPASS GRAFT      ECHO COMPLETE  10/9/2013         ENDOSCOPY, COLON, DIAGNOSTIC  04/18/2017    GALLBLADDER SURGERY      gallstones removed, [facility] 8/2017    HYSTERECTOMY      JOINT REPLACEMENT  2011    LEFT KNEE    KNEE SURGERY      left knee replacement    LAYER WOUND CLOSURE Left [id number]    LIVER BIOPSY  1/8/2016    [facility]    POLYSOMNOGRAPHY  1/2016    [facility]    UPPER GASTROINTESTINAL ENDOSCOPY  12/20/12    UPPER GASTROINTESTINAL ENDOSCOPY  12/23/2016    Dr Sophia Osorio UPPER GASTROINTESTINAL ENDOSCOPY  02/08/2017     Current Medications:    Current Facility-Administered Medications: pramoxine-hydrocortisone (ANALPRAM HC) rectal cream, , Rectal, TID  predniSONE (DELTASONE) tablet 40 mg, 40 mg, Oral, Daily  fluticasone (FLONASE) 50 MCG/ACT nasal spray 1 spray, 1 spray, Each Nare, Daily  levalbuterol (XOPENEX) nebulization 0.63 mg, 0.63 mg, Nebulization, Q6H  sodium chloride (OCEAN, BABY AYR) 0.65 % nasal spray 2 spray, 2 spray, Each Nare, PRN  miconazole (MICOTIN) 2 % powder, , Topical, BID  HYDROcodone-acetaminophen (NORCO) 5-325 MG per tablet 1 tablet, 1 tablet, Oral, Q6H PRN  aspirin chewable tablet 81 mg, 81 mg, Oral, release tablet 5 mg, 5 mg, Oral, Nightly  trimethobenzamide (TIGAN) injection 200 mg, 200 mg, Intramuscular, Q6H PRN  miconazole nitrate 2 % ointment, , Topical, BID  olopatadine (PATADAY) 0.2 % ophthalmic solution 1 drop, 1 drop, Both Eyes, Daily  melatonin tablet 1 mg, 1 mg, Oral, Nightly PRN  acetaminophen (TYLENOL) tablet 650 mg, 650 mg, Oral, Q4H PRN  glucose (GLUTOSE) 40 % oral gel 15 g, 15 g, Oral, PRN  dextrose 50 % solution 12.5 g, 12.5 g, Intravenous, PRN  glucagon (rDNA) injection 1 mg, 1 mg, Intramuscular, PRN  dextrose 5 % solution, 100 mL/hr, Intravenous, PRN    Allergies:  Latex; Bee venom; Dilaudid [hydromorphone hcl]; Dye [iodides]; Percocet [oxycodone-acetaminophen]; Keflex [cephalexin]; Lasix [furosemide]; Levaquin [levofloxacin in d5w]; Lipitor; Lyrica [pregabalin]; Morphine; Naproxen; Nefazodone; Norvasc [amlodipine]; Oxycodone-acetaminophen; Shellfish-derived products; and Trazodone and nefazodone    Social History:    Tobacco:  Pt reports she smokes cigarettes < 1/2 ppd x 30 yrs. Alcohol:  Pt reports she does not drink alcohol. Illicit Drugs: Pt reports she used to smoke Marijuana, none for some time now.      Family History: Mother , age 54 - Colon CA. Father , Asthma. 3 Sisters all  at age 54 - all from Sarcoidosis. 1 Brother , Prostate CA. 5 Brothers and 1 Sister living and healthy. Children - 1 Son age 21 hx MVC; 1 Dtr healthy at age 39. REVIEW OF SYSTEMS:    Aside from what was mentioned in the PMH and HPI, essentially unremarkable, all others negative.     PHYSICAL EXAM:      Vitals:    /78   Pulse 62   Temp 98.2 °F (36.8 °C) (Oral)   Resp 18   Ht 5' 2\" (1.575 m)   Wt 247 lb 7 oz (112.2 kg)   LMP 1990   SpO2 92%   BMI 45.26 kg/m²     CONSTITUTIONAL:  awake, alert, laying in bed in no apparent distress, cooperative, and appears stated age  EYES:  pupils equal, round and reactive to light, sclera non-icteric, and conjunctiva Date    ALKPHOS 74 04/10/2019    ALT 18 04/10/2019    AST 23 04/10/2019    PROT 7.4 04/10/2019    BILITOT 0.3 04/10/2019    BILIDIR <0.2 2017    IBILI 0.1 2017    LABALBU 3.8 04/10/2019    LABALBU 5.2 2011     PT/INR:    Lab Results   Component Value Date    PROTIME 11.7 2019    PROTIME 12.6 2011    INR 1.0 2019     PTT:    Lab Results   Component Value Date    APTT 38.0 2018   [APTT}  Last 3 Troponin:    Lab Results   Component Value Date    TROPONINI <0.01 2019    TROPONINI <0.01 2019    TROPONINI <0.01 2019     TSH:    Lab Results   Component Value Date    TSH 1.560 2017     VITAMIN B12:   Lab Results   Component Value Date    APLMTMRP97 802 2017     FOLATE:    Lab Results   Component Value Date    FOLATE 10.3 2017     IRON:    Lab Results   Component Value Date    IRON 51 2017     Iron Saturation:    Lab Results   Component Value Date    LABIRON 19 2017     TIBC:    Lab Results   Component Value Date    TIBC 266 2017     FERRITIN:    Lab Results   Component Value Date    FERRITIN 172 2017     HIV:  No results found for: HIV  JEF:    Lab Results   Component Value Date    JEF NEGATIVE 2017     No components found for: CHLPLat  Lab Results   Component Value Date    TRIG 84 2018    TRIG 215 (H) 2016    TRIG 298 (H) 2016   e    LDLCALC 167 (H) 2018    LDLCALC 89 2016    LDLCALC 100 (H) 2016              LA  Lab Results   Component Value Date    HDL 38 2018    HDL 27 2016    HDL 26 2016   vq  Lab Results   Component Value Date    LDLCALC 167 (H) 2018    LDLCALC 89 2016    LDLCALC 100 (H) 2016   )    Result Date: 4/10/2019  Patient MRN: [id number] : 1955 Age:  58 years Gender: Female Order Date: 4/10/2019 3:45 PM Exam: NM LUNG VENT/PERFUSION (VQ) Number of Images: views Indication:  Shortness of breath, chest pain, evaluate for possible pulmonary embolism. COMPARISON:  No prior comparison exam. Correlated with 3/4/2019 CXR exam. No more recent CXR exam currently available for correlation. TECHNIQUE:  Standard-protocol ventilation/perfusion scanning performed, with 7.8 mCi Tc99m MAA administered IV for perfusion portion of exam and 9.1 mCi Xe-133 aerosol administered for ventilation portion of exam. FINDINGS: Ventilation: Relatively symmetric pulmonary activity. No focal ventilation defect(s) seen. No evidence of significant air trapping. Perfusion: Relatively symmetric pulmonary activity. Multiple nonsegmental and subsegmental perfusion defects noted. Low probability for pulmonary embolism. Xr Chest Portable    Result Date: 4/10/2019  Patient MRN: [id number] : 1955 Age:  58 years Gender: Female Order Date: 4/10/2019 2:45 PM Exam: XR CHEST PORTABLE Number of Images: 1 views Indication:   cough, shortness of breath, wheezing cough, shortness of breath, wheezing COMPARISON:  3/4/2019. FINDINGS: Status post previous median sternotomy. Cardiomegaly and central pulmonary vascular congestion. Shallow pulmonary inflation with bilateral perihilar and infrahilar pulmonary infiltrates. No obvious large pleural effusion, extensive pulmonary consolidation, or gross pneumothorax. Questionable partially consolidative airspace opacity at right lung base. Correlate clinically for ongoing CHF, fluid overload, etc. Cannot exclude possible coexisting right basilar pneumonia, with clinical correlation necessary in this regard also. Continued follow-up advised. Ct Sinus Wo Contrast    Result Date: 4/15/2019  Patient MRN: [id number] : 1955 Age:  58 years Gender: Female Order Date: 4/15/2019 3:00 PM Exam: CT SINUS WO CONTRAST Number of Images: 039 views Indication:  Sinusitis Comparison: None.  Technique: CT of the sinuses was obtained in coronal images using bone algorithm without IV contrast. Radiation Output: CTDIvol 4 0.47 (mGy);

## 2019-04-18 NOTE — DISCHARGE SUMMARY
failure with hypoxia ([facility]    Upper respiratory tract infection    Controlled type 2 diabetes mellitus without complication (HCC)    Coronavirus infection    E. coli UTI  Resolved Problems:    * No resolved hospital problems. *      Consults:  IP CONSULT TO PULMONOLOGY  IP CONSULT TO PULMONOLOGY  IP CONSULT TO IV TEAM  IP CONSULT TO IV TEAM  IP CONSULT TO IV TEAM  IP CONSULT TO IV TEAM  IP CONSULT TO SOCIAL WORK  IP CONSULT TO GI  IP CONSULT TO IV TEAM    Procedures:     Hospital Course: Patient was admitted with COPD exacerbation ([facility] [J44.1]  COPD exacerbation ([facility] [J44.1]  COPD exacerbation ([facility] [J44.1]. Patient is a 58 y.o. female with a history of COPD, CAD, chronic systolic chf, HTN, CKD, DM2, HLD, morbid obesity, DAYAN  Who presented to the ED on 4-10-19 with worsening cough, wheezing and SOB and hemoptysis. Pulm was consulted and followed pt. She was found to be positive for Coronavirus. She was started on IV steroids, azithromycin and nebulizer treatments. Azithromycin was stopped d/t low Procalcitonin. She also was found to have a UTI for with she was treated with Ceftriaxone. She developed a ALEC and bumex was held. Initially she was requiring 4-5L nC but was able to be weaned down to room air. Her steroids were weaned down and she is currently taking Prednisone 40mg PO daily. On 4-17-19 pt did complain of and episode of karma pain, EKG ordered by Dr. Andrae Russell and serial troponins were negative x 3. During her stay she did complain of sinus congestion, CT of sinus completed without acute findings. Pt states chest pain resolved today. During her stay she did complain of rectal pain, GI was consulted and ordered CT, results as below. She was also given analpram. GI ok'd for discharge.      Discharge Exam:  Vitals:    04/18/19 0945 04/18/19 1230 04/18/19 1300 04/18/19 1430   BP:       Pulse:       Resp:       Temp:       TempSrc:       SpO2: 95% 94% 92% 92%   Weight:       Height:         General Appearance: alert and oriented to person, place and time, well-developed and well-nourished, in no acute distress  Skin: warm and dry, no rash or erythema  Head: normocephalic and atraumatic  Eyes: extraocular eye movements intact and conjunctivae normal  ENT: hearing grossly normal bilaterally  Neck: neck supple and non tender without mass and no thyromegaly, no lymphadenopathy   Pulmonary/Chest: Expiratory rhonchi, improved air movement  Cardiovascular: normal rate, regular rhythm, normal S1 and S2, no murmurs, no gallops and no JVD  Abdomen: soft, non-tender, non-distended, normal bowel sounds, no masses or organomegaly  Extremities: no cyanosis, no clubbing and no edema    I/O last 3 completed shifts:  In: -   Out: 1650 [Urine:1650]  No intake/output data recorded. LABS:  Recent Labs     04/16/19  0330 04/17/19  0453 04/18/19  0510    139 137   K 5.0 5.7* 4.4   CL 96* 96* 95*   CO2 35* 36* 34*   BUN 31* 31* 28*   CREATININE 1.3* 1.2* 1.1*   GLUCOSE 99 97 203*   CALCIUM 9.2 9.3 8.7       Recent Labs     04/16/19  0330   WBC 10.3   RBC 4.34   HGB 13.3   HCT 43.4   .0*   MCH 30.6   MCHC 30.6*   RDW 13.2      MPV 10.1       No results for input(s): POCGLU in the last 72 hours. Imaging:   CT ABDOMEN PELVIS WO CONTRAST Additional Contrast? Oral   Final Result   Hepatomegaly. Small low-density focus within the left lobe   of the liver, uncertain significance. Small foci of increased and   decreased density within the kidneys, as above, of uncertain   significance. If clinically indicated, ultrasonography might be   helpful for additional evaluation. No obstructive uropathy. Diverticular disease of the colon without pericolic inflammatory   change. XR CHEST STANDARD (2 VW)   Final Result   Moderate cardiomegaly without congestive heart failure               CT SINUS WO CONTRAST   Final Result      NORMAL CT OF THE PARANASAL SINUSES.           XR CHEST PORTABLE   Final Result   Correlate clinically for ongoing CHF, fluid overload, etc.   Cannot exclude possible coexisting right basilar pneumonia, with   clinical correlation necessary in this regard also. Continued follow-up advised. NM LUNG VENT/PERFUSION (VQ)   Final Result   Low probability for pulmonary embolism. Patient Instructions:      Medication List      START taking these medications    pramoxine-hydrocortisone 1-1 % rectal cream  Commonly known as:  ANALPRAM HC  Place rectally 2 times daily. predniSONE 10 MG tablet  Commonly known as:  DELTASONE  Take 4 tablets by mouth daily for 1 day, THEN 3 tablets daily for 1 day, THEN 2 tablets daily for 1 day, THEN 1 tablet daily for 1 day. Start taking on:  4/19/2019        CHANGE how you take these medications    clotrimazole-betamethasone 1-0.05 % cream  Commonly known as:  LOTRISONE  Apply topically 2 times daily. What changed:    · how to take this  · when to take this  · additional instructions     fluticasone 50 MCG/ACT nasal spray  Commonly known as:  FLONASE  1 spray by Nasal route 2 times daily  What changed:  how much to take     rosuvastatin 5 MG tablet  Commonly known as:  CRESTOR  TAKE 1 TABLET BY MOUTH EVERY DAY  What changed:  See the new instructions. CONTINUE taking these medications    albuterol (2.5 MG/3ML) 0.083% nebulizer solution  Commonly known as:  PROVENTIL     aspirin 81 MG chewable tablet  TAKE 1 TABLET BY MOUTH DAILY     Azelastine HCl 137 MCG/SPRAY Soln     baclofen 10 MG tablet  Commonly known as:  LIORESAL  TAKE 1 TABLET BY MOUTH TWICE A DAY AS NEEDED     BiPAP Machine Misc     blood glucose test strips strip  Commonly known as:  ACCU-CHEK COMPACT TEST DRUM  1 each by In Vitro route 3 times daily As needed.      bumetanide 1 MG tablet  Commonly known as:  BUMEX     cetirizine 10 MG tablet  Commonly known as:  ZYRTEC  Take 1 tablet by mouth daily     clopidogrel 75 MG tablet  Commonly known as:  PLAVIX  Take 1 tablet by mouth daily     [employer] ARTHRITIS PAIN RELIEF 650 MG extended release tablet  Generic drug:  acetaminophen     diphenoxylate-atropine 2.5-0.025 MG per tablet  Commonly known as:  LOMOTIL     DULERA 200-5 MCG/ACT inhaler  Generic drug:  mometasone-formoterol     DULoxetine 60 MG extended release capsule  Commonly known as:  CYMBALTA  TAKE ONE CAPSULE BY MOUTH TWICE A DAY     FISH OIL OMEGA-3 PO     gabapentin 300 MG capsule  Commonly known as:  NEURONTIN     GLUCOSE MANAGEMENT Tabs  Use daily prn if low glucose <60     guaiFENesin 600 MG extended release tablet  Commonly known as:  MUCINEX  Take 2 tablets by mouth 2 times daily as needed for Congestion     hydrALAZINE 100 MG tablet  Commonly known as:  APRESOLINE  Take 1 tablet by mouth 3 times daily     hydrocortisone 2.5 % cream  APPLY TO AFFECTED AREA TWICE DAILY     Insulin Degludec 200 UNIT/ML Sopn  Commonly known as:  TRESIBA FLEXTOUCH  Inject 50 Units into the skin 2 times daily     insulin lispro 100 UNIT/ML pen  Commonly known as:  HUMALOG KWIKPEN  INJECT 35 UNITS SUBCUTANEOUSLY THREE TIMES A DAY BEFORE MEALS     * Insulin Pen Needle 31G X 5 MM Misc  Commonly known as:  B-D UF III MINI PEN NEEDLES  1 each by Does not apply route daily     * B-D UF III MINI PEN NEEDLES 31G X 5 MM Misc  Generic drug:  Insulin Pen Needle  USE EVERY DAY AS DIRECTED     Insulin Syringe-Needle U-100 31G X 5/16\" 1 ML Misc  1 each by Does not apply route daily Use as directed     isosorbide mononitrate 60 MG extended release tablet  Commonly known as:  IMDUR  Take 1 tablet by mouth daily     lidocaine 5 % ointment  Commonly known as:  XYLOCAINE  APPLY TOPICALLY AS NEEDED FOR PAIN     metoclopramide 5 MG tablet  Commonly known as:  REGLAN     metoprolol succinate 25 MG extended release tablet  Commonly known as:  TOPROL XL  Take 1 tablet by mouth 2 times daily     miconazole 2 % cream  Commonly known as:  MICOTIN  Apply topically 2 times daily.      Mirabegron ER 50 MG Tb24  Take 50 mg by mouth daily     montelukast 10 MG tablet  Commonly known as:  SINGULAIR     nitroGLYCERIN 0.4 MG SL tablet  Commonly known as:  NITROSTAT  up to max of 3 total doses. If no relief after 1 dose, call 911. olopatadine 0.2 % Soln ophthalmic solution  Commonly known as:  PATADAY     oxybutynin 5 MG extended release tablet  Commonly known as:  DITROPAN XL  Take 1 tablet by mouth daily     pantoprazole 40 MG tablet  Commonly known as:  PROTONIX     QUEtiapine 25 MG tablet  Commonly known as:  SEROQUEL  TAKE 1 TABLET BY MOUTH EVERY DAY IN THE EVENING     sacubitril-valsartan 24-26 MG per tablet  Commonly known as:  ENTRESTO  Take 1 tablet by mouth 2 times daily     * SOFT TOUCH LANCETS Misc  Test 3 times daily     * ACCU-CHEK FASTCLIX LANCETS Misc     therapeutic multivitamin-minerals tablet     traZODone 100 MG tablet  Commonly known as:  DESYREL     ursodiol 300 MG capsule  Commonly known as:  ACTIGALL     vitamin D 55527 units Caps capsule  Commonly known as:  ERGOCALCIFEROL  Take 1 capsule by mouth once a week On Sundays         * This list has 4 medication(s) that are the same as other medications prescribed for you. Read the directions carefully, and ask your doctor or other care provider to review them with you. Where to Get Your Medications      These medications were sent to [facility]/pharmacy [address] [place] [address] 22. [facility] [address]    Phone:  [phone]   · pramoxine-hydrocortisone 1-1 % rectal cream  · predniSONE 10 MG tablet  · rosuvastatin 5 MG tablet           Note that more than 30 minutes was spent in preparing discharge papers, discussing discharge with patient, medication review, etc.    Signed:  Electronically signed by Dayanna Newton PA-C on 4/18/2019 at 4:42 PM    NOTE: This report was transcribed using voice recognition software.  Every effort was made to ensure accuracy; however, inadvertent computerized transcription errors may be present.

## 2019-04-18 NOTE — PROGRESS NOTES
Date: 4/18/2019    Time: 12:49 AM    Patient Placed On BIPAP/CPAP/ Non-Invasive Ventilation? Yes    If no must comment. Facial area red/color change? No           If YES are Blister/Lesion present? No   If yes must notify nursing staff  BIPAP/CPAP skin barrier?   Yes    Skin barrier type:Liquicel       Comments:        Sandoval Gleason

## 2019-04-18 NOTE — PROGRESS NOTES
[facility] Quality Flow/Interdisciplinary Rounds Progress Note        Quality Flow Rounds held on April 18, 2019    Disciplines Attending:  Bedside Nurse, ,  and Nursing Unit Leadership    Leilani Cobian was admitted on 4/10/2019  2:02 PM    Anticipated Discharge Date:  Expected Discharge Date: 04/13/19    Disposition:    Jadon Score:  Jadon Scale Score: 20    Readmission Risk              Risk of Unplanned Readmission:        61           Discussed patient goal for the day, patient clinical progression, and barriers to discharge.   The following Goal(s) of the Day/Commitment(s) have been identified:  discharge planning; wean O2      Jefry Corbin  April 18, 2019

## 2019-04-18 NOTE — PROGRESS NOTES
Occupational Therapy  OT BEDSIDE TREATMENT NOTE      Date:2019  Patient Name: Jena Min  MRN: [id number]  : 1955  Room: [address] [place] [address]     Per OT Eval:    Evaluating OT: Sánchez Navarro OTR/L [id number]     AM-PAC Daily Activity Raw Score:      Recommended Adaptive Equipment: TBD      Diagnosis: COPD    Pertinent Medical History: asthma,  Cerebellum infarct 2019, neuropathy, OA, PVD   Precautions: falls, bed alarm, O2, [place]     Home Living: Pt lives alone. 1 floor with 2 steps/2 rails. Caregivers 7 days/week - assist with lower body ADLs and IADLs. Tub/shower unit with seat   Prior Level of Function: assist  with ADLs , assist  with IADLs; ambulated walke     Pain Level: Patient reported experiencing a headache, but noted that she had received pain medication recently from nursing. Cognition: Patient alert and oriented. Fair insight to current abilities/limitations demonstrated. Functional Assessment:    Initial Eval Status  Date: 19 Treatment Session  Date: 19 Short Term Goals  Treatment frequency: PRN   Feeding Set-up        Grooming SBA/set-up,seated  SBA (standing at sink) for completion of hand washing  Mod I   UB Dressing Mod A  Managing hospital gown   Mod I   LB Dressing Max A  PTA: assist  Setup to don sock-like slippers while seated at EOB; verbal encouragement needed to maximize patient's participation in LB dressing tasks. Independent with doffing slippers while seated at EOB.  Min A   Bathing         Toileting   Supervision with all aspects of toileting     Bed Mobility  Sitting in chair upon arrival  Independent      Functional Transfers Supervision   Sit-stand from chair  Sit-to-Stand: Supervision from EOB and toilet Mod I   Functional Mobility Supervision,w/walker, O2  Household distance  Supervision (with walker) within patient's room, bathroom, and hallway Mod I with good tolerance    Balance Sitting:     Static:  Independent     Standing: supervision  Sitting; Good   (at EOB)  Standing: Fair+   (with walker)     Activity Tolerance SOB with activity   O2 sats in 90 s during activity on 4 L O2 Fair  Good with ADL activity      Comments: Upon this OTR's arrival to patient's room, patient supine in bed. Upon conclusion of this session, patient supine in bed (per patient request) with all lines and tubes intact, nursing present, and call light within reach. Education: Patient education provided regardin) safe transfer techniques, 2) importance of maximizing independence with ADLs. Patient demonstrated [address] understanding. · Patient has made progress towards set goals. · Continue with current plan of care. Time Out: 1210  Total Treatment Time: 15 minutes    Arianne Ledesma, OTR/L  License Number: [id number]

## 2019-04-19 LAB
EKG ATRIAL RATE: 64 BPM
EKG P AXIS: 34 DEGREES
EKG P-R INTERVAL: 164 MS
EKG Q-T INTERVAL: 454 MS
EKG QRS DURATION: 136 MS
EKG QTC CALCULATION (BAZETT): 468 MS
EKG R AXIS: 23 DEGREES
EKG T AXIS: 70 DEGREES
EKG VENTRICULAR RATE: 64 BPM

## 2019-04-19 PROCEDURE — 93010 ELECTROCARDIOGRAM REPORT: CPT | Performed by: INTERNAL MEDICINE

## 2019-04-23 ENCOUNTER — OFFICE VISIT (OUTPATIENT)
Dept: PHYSICAL MEDICINE AND REHAB | Age: 64
End: 2019-04-23
Payer: MEDICARE

## 2019-04-23 VITALS
HEIGHT: 61 IN | BODY MASS INDEX: 46.63 KG/M2 | DIASTOLIC BLOOD PRESSURE: 78 MMHG | SYSTOLIC BLOOD PRESSURE: 132 MMHG | HEART RATE: 100 BPM | WEIGHT: 247 LBS | TEMPERATURE: 98.9 F

## 2019-04-23 DIAGNOSIS — M17.0 PRIMARY OSTEOARTHRITIS OF BOTH KNEES: ICD-10-CM

## 2019-04-23 DIAGNOSIS — G56.03 BILATERAL CARPAL TUNNEL SYNDROME: Primary | ICD-10-CM

## 2019-04-23 DIAGNOSIS — G56.03 BILATERAL CARPAL TUNNEL SYNDROME: ICD-10-CM

## 2019-04-23 PROCEDURE — 76942 ECHO GUIDE FOR BIOPSY: CPT | Performed by: PHYSICAL MEDICINE & REHABILITATION

## 2019-04-23 PROCEDURE — 99999 PR OFFICE/OUTPT VISIT,PROCEDURE ONLY: CPT | Performed by: PHYSICAL MEDICINE & REHABILITATION

## 2019-04-23 PROCEDURE — 20526 THER INJECTION CARP TUNNEL: CPT | Performed by: PHYSICAL MEDICINE & REHABILITATION

## 2019-04-23 RX ORDER — LIDOCAINE HYDROCHLORIDE 10 MG/ML
2 INJECTION, SOLUTION INFILTRATION; PERINEURAL ONCE
Status: COMPLETED | OUTPATIENT
Start: 2019-04-23 | End: 2019-04-23

## 2019-04-23 RX ORDER — TRIAMCINOLONE ACETONIDE 40 MG/ML
40 INJECTION, SUSPENSION INTRA-ARTICULAR; INTRAMUSCULAR ONCE
Status: COMPLETED | OUTPATIENT
Start: 2019-04-23 | End: 2019-04-23

## 2019-04-23 RX ADMIN — LIDOCAINE HYDROCHLORIDE 2 ML: 10 INJECTION, SOLUTION INFILTRATION; PERINEURAL at 15:43

## 2019-04-23 RX ADMIN — TRIAMCINOLONE ACETONIDE 40 MG: 40 INJECTION, SUSPENSION INTRA-ARTICULAR; INTRAMUSCULAR at 15:43

## 2019-04-23 NOTE — PROGRESS NOTES
Eliana Murcia D.O. [facility]  [address] [facility] [place]  Phone: [phone]  Fax: [phone]    4/23/2019    Chief Complaint   Patient presents with    Injections     Right carpal Tunnel Injection       Last injection: 3/29/18  Taking anticoagulants/antiplatelets: No  Diabetic: Yes and No  Febrile/active infection: No    After explaining the indications, risks, benefits and alternatives of a Right carpal tunnel injection, the patient agreed to proceed. Permit wwas signed and scanned into the media. The patient was placed in the seated position. The skin on the volar wrist was prepared with chloraprep. Ethyl Chloride vapocoolant spray was used for local anesthesia. Using an aseptic, no touch technique, a 25 gauge, 5/8\" needle with 2 cc of Xylocaine 1% and 1 cc of Kenalog 40 mg/cc was directed into the carpal tunnel using ultrasound guidance  After negative aspiration, the medication was injected. Adequate hemostasis was obtained and a bandage applied to the injection site. The patient tolerated the procedure well and was educated in post injection care. There was post injection reduction in pain. The images are uploaded separately in the EMR. Eliana Murcia D.O., P.T.   Board Certified Physical Medicine and Rehabilitation  Board Certified Electrodiagnostic Medicine    Administrations This Visit     lidocaine 1 % injection 2 mL     Admin Date  04/23/2019  15:43 Action  Given Dose  2 mL Route  Other Site   Administered By  Cassy Vazquez MA    Ordering Provider:  Lionel Amos DO ND:  [id number]    Lot#:  [id number]    :  Ching Snyder    Patient Supplied?:  No    Comments:  EXP: 02/2021          triamcinolone acetonide (KENALOG-40) injection 40 mg     Admin Date  04/23/2019  15:43 Action  Given Dose  40 mg Route  Intra-articular Site   Administered By  Cassy Vazquez MA    Ordering Provider:  DO ASIYA Morerll:

## 2019-04-26 ENCOUNTER — OFFICE VISIT (OUTPATIENT)
Dept: FAMILY MEDICINE CLINIC | Age: 64
End: 2019-04-26
Payer: MEDICARE

## 2019-04-26 DIAGNOSIS — I50.42 CHRONIC COMBINED SYSTOLIC AND DIASTOLIC HEART FAILURE (HCC): ICD-10-CM

## 2019-04-26 DIAGNOSIS — E11.00 TYPE 2 DIABETES MELLITUS WITH HYPEROSMOLARITY WITHOUT COMA, WITH LONG-TERM CURRENT USE OF INSULIN (HCC): Primary | Chronic | ICD-10-CM

## 2019-04-26 DIAGNOSIS — G47.33 OSA AND COPD OVERLAP SYNDROME (HCC): ICD-10-CM

## 2019-04-26 DIAGNOSIS — J44.9 OSA AND COPD OVERLAP SYNDROME (HCC): ICD-10-CM

## 2019-04-26 DIAGNOSIS — E66.01 MORBID OBESITY DUE TO EXCESS CALORIES (HCC): ICD-10-CM

## 2019-04-26 DIAGNOSIS — I70.222 ATHEROSCLEROSIS OF NATIVE ARTERIES OF EXTREMITIES WITH REST PAIN, LEFT LEG (HCC): ICD-10-CM

## 2019-04-26 DIAGNOSIS — I73.9 PVD (PERIPHERAL VASCULAR DISEASE) (HCC): ICD-10-CM

## 2019-04-26 DIAGNOSIS — Z79.4 TYPE 2 DIABETES MELLITUS WITH HYPEROSMOLARITY WITHOUT COMA, WITH LONG-TERM CURRENT USE OF INSULIN (HCC): Primary | Chronic | ICD-10-CM

## 2019-04-26 LAB
CHP ED QC CHECK: NORMAL
GLUCOSE BLD-MCNC: 92 MG/DL
HBA1C MFR BLD: 6.2 %

## 2019-04-26 PROCEDURE — 3017F COLORECTAL CA SCREEN DOC REV: CPT | Performed by: FAMILY MEDICINE

## 2019-04-26 PROCEDURE — 99214 OFFICE O/P EST MOD 30 MIN: CPT | Performed by: FAMILY MEDICINE

## 2019-04-26 PROCEDURE — G8598 ASA/ANTIPLAT THER USED: HCPCS | Performed by: FAMILY MEDICINE

## 2019-04-26 PROCEDURE — G8427 DOCREV CUR MEDS BY ELIG CLIN: HCPCS | Performed by: FAMILY MEDICINE

## 2019-04-26 PROCEDURE — 1111F DSCHRG MED/CURRENT MED MERGE: CPT | Performed by: FAMILY MEDICINE

## 2019-04-26 PROCEDURE — 83036 HEMOGLOBIN GLYCOSYLATED A1C: CPT | Performed by: FAMILY MEDICINE

## 2019-04-26 PROCEDURE — 4004F PT TOBACCO SCREEN RCVD TLK: CPT | Performed by: FAMILY MEDICINE

## 2019-04-26 PROCEDURE — G8417 CALC BMI ABV UP PARAM F/U: HCPCS | Performed by: FAMILY MEDICINE

## 2019-04-26 PROCEDURE — 3044F HG A1C LEVEL LT 7.0%: CPT | Performed by: FAMILY MEDICINE

## 2019-04-26 PROCEDURE — G8926 SPIRO NO PERF OR DOC: HCPCS | Performed by: FAMILY MEDICINE

## 2019-04-26 PROCEDURE — 82962 GLUCOSE BLOOD TEST: CPT | Performed by: FAMILY MEDICINE

## 2019-04-26 PROCEDURE — 3023F SPIROM DOC REV: CPT | Performed by: FAMILY MEDICINE

## 2019-04-26 PROCEDURE — 2022F DILAT RTA XM EVC RTNOPTHY: CPT | Performed by: FAMILY MEDICINE

## 2019-04-26 ASSESSMENT — ENCOUNTER SYMPTOMS
SHORTNESS OF BREATH: 0
SINUS PRESSURE: 0
SORE THROAT: 0
NAUSEA: 0
DIARRHEA: 0
TROUBLE SWALLOWING: 0
COUGH: 1
CONSTIPATION: 0
SINUS PAIN: 0
VOMITING: 0
RHINORRHEA: 0
ABDOMINAL PAIN: 0
BACK PAIN: 0
EYE PAIN: 0

## 2019-04-26 NOTE — TELEPHONE ENCOUNTER
Has she been checking her sugars before meals, how many units is she taking and what are her sugars?

## 2019-04-26 NOTE — PROGRESS NOTES
Cloteal Bolivar  : 1955    Chief Complaint:     Chief Complaint   Patient presents with    [place] [facility]     [place]- COPD exacerbation    Hypoglycemia     sugars running low around 50-86 - Taking 36 units Humalog & 50 units Lunda Lechuga       HPI  Cloteal Sujatha 58 y.o. presents for   Chief Complaint   Patient presents with    [place] [facility]     [place]- COPD exacerbation    Hypoglycemia     sugars running low around 50-86 - Taking 36 units Humalog & 50 units Tresiba     Diabetes/DAYAN/obesity/CHF/PVD/athersclerosis  Patient was recently seen from the hospital on 4/10/19-19. She was admitted for COPD exacerbation. She states she feels well since being out of the hospital. Her A1c today is 6.2. She did have some hypoglycemic episodes in the hospital as well. She is currently on much insulin and likely we are over treating this. She continues to smoke cigarettes. Again with her hx we discussed how it is imperative to stop smoking. She did get her marijuana card and will be following with a medical doctor soon about this. She currently denies any swelling in her feet, chest pain, dyspnea, abdominal pain, diarrhea, constipation. All questions were answered to patients satisfaction.     Past Medical History:   Diagnosis Date    Asthma     Atherosclerosis of native artery of left leg with rest pain ([address] 2018    C. difficile diarrhea     CAD (coronary artery disease)     Cellulitis and abscess of trunk 2015    Cerebellar infarct ([address] 4/3/2019    Remote, rt. cerebellum, head CT scan, 19    Chronic back pain     Chronic kidney disease     Chronic systolic congestive heart failure ([address] 10/4/2013    Chronic venous insufficiency 10/11/2017    COPD (chronic obstructive pulmonary disease) ([address]     Depression     Diabetes mellitus ([address]     Diabetic neuropathy ([address]     Diverticulosis     Fatty liver 2016    per [place]    Glaucoma, open angle 2/1/2016    Mild-OU    Hepatic encephalopathy ([address] 02/07/2016    resolved    Hiatal hernia     History of blood transfusion     Hyperlipidemia     Hyperplastic colon polyp     Hypertension     Incontinence     Liver mass     Morbid obesity ([address]     Movement disorder     Myocardial infarction [facility]) 2011    Osteoarthritis, generalized     Pain in both lower legs 10/11/2017    Peripheral vascular disease [facility]     Pneumonia 1/26/2014    Pulmonary edema     resolved    PVD (peripheral vascular disease) with claudication ([address] 8/30/2017    Sleep apnea     uses BI PAP    Tobacco abuse     Tobacco abuse 10/11/2017    Tubular adenoma polyp of rectum     Urinary tract infection due to ESBL Klebsiella 03/08/2017    Ventral hernia        Past Surgical History:   Procedure Laterality Date    ANGIOPLASTY  11/13/2018    Dr. Méndez Ache & athrectomy L SFA & Popliteal    BREAST SURGERY  2000    bilateral reduction    BRONCHIAL BRUSH BIOPSY  1/25/2013    Dr Pipo Cunha  04/20/2015    Dr. Luh Lanier  12/2011     DR. Clarence Edwards,  follows Dr Lux Deng  11/15/2013    Dr Emanuel Mcmahon COLONOSCOPY  12/23/2016    Dr Tracy Larger adenoma & hyperplastic polyps, melanosis coli (repeat one year 12/2017)    CORONARY ARTERY BYPASS GRAFT      ECHO COMPLETE  10/9/2013         ENDOSCOPY, COLON, DIAGNOSTIC  04/18/2017    GALLBLADDER SURGERY      gallstones removed, [facility] 8/2017    HYSTERECTOMY      JOINT REPLACEMENT  2011    LEFT KNEE    KNEE SURGERY      left knee replacement    LAYER WOUND CLOSURE Left [id number]    LIVER BIOPSY  1/8/2016    [facility]    POLYSOMNOGRAPHY  1/2016    [facility]    UPPER GASTROINTESTINAL ENDOSCOPY  12/20/12    UPPER GASTROINTESTINAL ENDOSCOPY  12/23/2016    Dr Emanuel Mcmahon UPPER GASTROINTESTINAL ENDOSCOPY  02/08/2017       Social History     Socioeconomic History    Marital status:   Spouse name: None    Number of children: 2    Years of education: None    Highest education level: None   Occupational History    Occupation: disability   Social Needs    Financial resource strain: None    Food insecurity:     Worry: None     Inability: None    Transportation needs:     Medical: None     Non-medical: None   Tobacco Use    Smoking status: Current Every Day Smoker     Packs/day: 0.25     Years: 40.00     Pack years: 10.00     Types: Cigarettes     Start date: 8/10/1974    Smokeless tobacco: Never Used    Tobacco comment: 1 pack every 3 days (6-7 cig)   Substance and Sexual Activity    Alcohol use: No     Alcohol/week: 0.0 oz    Drug use: Yes     Types: Marijuana     Comment: \"every 4th or 5th day out of the week\"    Sexual activity: Never   Lifestyle    Physical activity:     Days per week: None     Minutes per session: None    Stress: None   Relationships    Social connections:     Talks on phone: None     Gets together: None     Attends Judaism service: None     Active member of club or organization: None     Attends meetings of clubs or organizations: None     Relationship status: None    Intimate partner violence:     Fear of current or ex partner: None     Emotionally abused: None     Physically abused: None     Forced sexual activity: None   Other Topics Concern    None   Social History Narrative    Pt lives with brother in her house-pt has never driven a car and depends on transportation       Family History   Problem Relation Age of Onset    Cancer Mother     Asthma Father           Current Outpatient Medications   Medication Sig Dispense Refill    Insulin Degludec (TRESIBA FLEXTOUCH) 200 UNIT/ML SOPN Inject 50 Units into the skin nightly 15 pen 2    insulin lispro (HUMALOG KWIKPEN) 100 UNIT/ML pen INJECT 15 UNITS SUBCUTANEOUSLY THREE TIMES A DAY BEFORE MEALS 15 mL 5    rosuvastatin (CRESTOR) 5 MG tablet TAKE 1 TABLET BY MOUTH EVERY DAY 30 tablet 2    pramoxine-hydrocortisone (ANALPRAM HC) 1-1 % rectal cream Place rectally 2 times daily. 1 Tube 0    [employer] ARTHRITIS PAIN RELIEF 650 MG extended release tablet Take 1 tablet by mouth 3 times daily as needed  3    Azelastine HCl 137 MCG/SPRAY SOLN 1 spray by Nasal route 3 times daily as needed 1 spray each nostirl TID  3    baclofen (LIORESAL) 10 MG tablet TAKE 1 TABLET BY MOUTH TWICE A DAY AS NEEDED 30 tablet 0    oxybutynin (DITROPAN XL) 5 MG extended release tablet Take 1 tablet by mouth daily 90 tablet 0    isosorbide mononitrate (IMDUR) 60 MG extended release tablet Take 1 tablet by mouth daily 90 tablet 3    sacubitril-valsartan (ENTRESTO) 24-26 MG per tablet Take 1 tablet by mouth 2 times daily 180 tablet 3    metoprolol succinate (TOPROL XL) 25 MG extended release tablet Take 1 tablet by mouth 2 times daily 60 tablet 5    hydrALAZINE (APRESOLINE) 100 MG tablet Take 1 tablet by mouth 3 times daily 270 tablet 3    QUEtiapine (SEROQUEL) 25 MG tablet TAKE 1 TABLET BY MOUTH EVERY DAY IN THE EVENING 90 tablet 0    aspirin 81 MG chewable tablet TAKE 1 TABLET BY MOUTH DAILY 30 tablet 2    B-D UF III MINI PEN NEEDLES 31G X 5 MM MISC USE EVERY DAY AS DIRECTED 100 each 3    blood glucose test strips (ACCU-CHEK COMPACT TEST DRUM) strip 1 each by In Vitro route 3 times daily As needed.  100 each 3    guaiFENesin (MUCINEX) 600 MG extended release tablet Take 2 tablets by mouth 2 times daily as needed for Congestion 30 tablet 2    traZODone (DESYREL) 100 MG tablet TAKE 1 TABLET BY MOUTH AT NIGHT  3    cetirizine (ZYRTEC) 10 MG tablet Take 1 tablet by mouth daily 30 tablet 3    lidocaine (XYLOCAINE) 5 % ointment APPLY TOPICALLY AS NEEDED FOR PAIN 35.44 g 11    DULoxetine (CYMBALTA) 60 MG extended release capsule TAKE ONE CAPSULE BY MOUTH TWICE A  capsule 1    Insulin Syringe-Needle U-100 31G X 5/16\" 1 ML MISC 1 each by Does not apply route daily Use as directed 100 each 11    clopidogrel (PLAVIX) 75 MG spray by Nasal route 2 times daily (Patient taking differently: 2 sprays by Nasal route 2 times daily ) 1 Bottle 3    BiPAP Machine MISC by Does not apply route nightly 27/23       No current facility-administered medications for this visit. Allergies   Allergen Reactions    Latex Hives    Bee Venom Anaphylaxis    Dilaudid [Hydromorphone Hcl] Itching    Dye [Iodides] Hives and Shortness Of Breath    Percocet [Oxycodone-Acetaminophen] Shortness Of Breath and Itching    Keflex [Cephalexin] Itching and Rash    Lasix [Furosemide] Other (See Comments)     Pt states she cramps up and gets headaches    Levaquin [Levofloxacin In D5w] Hives    Lipitor      MUSCLE SPASMS    Lyrica [Pregabalin]      Dream disturbances    Morphine Hives    Naproxen      Unsure of reaction;pt states able to take Aleve without difficulties    Nefazodone Other (See Comments)    Norvasc [Amlodipine] Swelling    Oxycodone-Acetaminophen Swelling    Shellfish-Derived Products     Trazodone And Nefazodone        Health Maintenance Due   Topic Date Due    Hepatitis A vaccine (1 of 2 - Risk 2-dose series) 12/29/1956    Pneumococcal 0-64 years Vaccine (1 of 1 - PPSV23) 12/29/1960    Hepatitis B Vaccine (1 of 3 - Risk 3-dose series) 12/29/1973    DTaP/Tdap/Td vaccine (1 - Tdap) 12/29/1973    Shingles Vaccine (1 of 2) 12/29/2004    Diabetic retinal exam  05/13/2016    Cervical cancer screen  09/16/2016    Breast cancer screen  11/10/2018    Diabetic microalbuminuria test  05/09/2019           REVIEW OF SYSTEMS  Review of Systems   Constitutional: Negative for chills, fatigue and fever. Uses walker   HENT: Positive for postnasal drip. Negative for congestion, ear pain, rhinorrhea, sinus pressure, sinus pain, sneezing, sore throat and trouble swallowing. Eyes: Negative for pain. Respiratory: Positive for cough. Negative for shortness of breath.          Mucus production   Cardiovascular: Negative for chest pain and leg swelling. Gastrointestinal: Negative for abdominal pain, constipation, diarrhea, nausea and vomiting. Endocrine: Negative for cold intolerance and heat intolerance. Genitourinary: Negative for dysuria, frequency and urgency. Musculoskeletal: Positive for arthralgias, gait problem and neck pain. Negative for back pain and myalgias. Skin: Negative for rash. Allergic/Immunologic: Positive for environmental allergies. Negative for food allergies. Neurological: Negative for dizziness, tremors, syncope, light-headedness and headaches. Hematological: Negative for adenopathy. Does not bruise/bleed easily. Psychiatric/Behavioral: Positive for sleep disturbance. Negative for behavioral problems and dysphoric mood. The patient is not nervous/anxious. PHYSICAL EXAM  /80   Pulse 122   Temp 97 °F (36.1 °C) (Temporal)   Resp 16   Ht 5' 1\" (1.549 m)   Wt 251 lb (113.9 kg)   LMP 01/01/1990   SpO2 91%   BMI 47.43 kg/m²   Physical Exam   Constitutional: She is oriented to person, place, and time. She appears well-developed and well-nourished. Uses walker  Obese   HENT:   Head: Normocephalic and atraumatic. Right Ear: External ear normal.   Left Ear: External ear normal.   Nose: Nose normal.   Mouth/Throat: Oropharynx is clear and moist.   Eyes: Pupils are equal, round, and reactive to light. Conjunctivae and EOM are normal.   Neck: Normal range of motion. Neck supple. No JVD present. Cardiovascular: Normal rate, regular rhythm and normal heart sounds. No murmur heard. No edema, pvd changes noted on LE   Pulmonary/Chest: Effort normal. No respiratory distress. She has no wheezes. She has no rales. Abdominal: Soft. Bowel sounds are normal. There is no tenderness. Musculoskeletal: She exhibits tenderness (lumbar, cervical spinal musculatrue). Uses walker     Lymphadenopathy:     She has no cervical adenopathy. Neurological: She is alert and oriented to person, place, and time. Coordination normal.   Skin: Skin is warm and dry. No rash noted. Psychiatric: She has a normal mood and affect. Her behavior is normal.   Nursing note and vitals reviewed. Laboratory: All laboratory and radiology results have been personally reviewed by myself    Lab Results   Component Value Date     04/18/2019    K 4.4 04/18/2019    CL 95 04/18/2019    CO2 34 04/18/2019    BUN 28 04/18/2019    CREATININE 1.1 04/18/2019    PROT 7.4 04/10/2019    LABALBU 3.8 04/10/2019    LABALBU 5.2 12/01/2011    CALCIUM 8.7 04/18/2019    GFRAA >60 04/18/2019    LABGLOM >60 04/18/2019    GLUCOSE 92 04/26/2019    GLUCOSE 181 12/01/2011    AST 23 04/10/2019    ALT 18 04/10/2019    ALKPHOS 74 04/10/2019    BILITOT 0.3 04/10/2019    TSH 1.560 11/07/2017    VITD25 33 12/01/2016    CHOL 222 06/05/2018    TRIG 84 06/05/2018    HDL 38 06/05/2018    LDLCALC 167 06/05/2018    LABA1C 6.2 04/26/2019        Lab Results   Component Value Date    CHOL 222 (H) 06/05/2018    CHOL 159 12/01/2016    CHOL 186 04/07/2016     Lab Results   Component Value Date    TRIG 84 06/05/2018    TRIG 215 (H) 12/01/2016    TRIG 298 (H) 04/07/2016     Lab Results   Component Value Date    HDL 38 06/05/2018    HDL 27 12/01/2016    HDL 26 04/07/2016     Lab Results   Component Value Date    LDLCALC 167 (H) 06/05/2018    LDLCALC 89 12/01/2016    LDLCALC 100 (H) 04/07/2016       Lab Results   Component Value Date    LABA1C 6.2 04/26/2019    LABA1C 6.0 09/13/2018    LABA1C 6.1 (H) 07/27/2018     Lab Results   Component Value Date    LABMICR 82.0 (H) 12/01/2016    LDLCALC 167 (H) 06/05/2018    CREATININE 1.1 (H) 04/18/2019       ASSESSMENT/PLAN:     Diagnosis Orders   1. Type 2 diabetes mellitus with hyperosmolarity without coma, with long-term current use of insulin (HCC)  POCT glycosylated hemoglobin (Hb A1C)    POCT Glucose    BASIC METABOLIC PANEL   2. DAYAN and COPD overlap syndrome ([address]     3. Morbid obesity due to excess calories ([address]     4. Chronic combined systolic and diastolic heart failure ([facility]     5. PVD (peripheral vascular disease) ([facility]     6. Atherosclerosis of native arteries of extremities with rest pain, left leg (HCC)       Decrease tresiba to 50 units once daily and humalog to 15 units before meals. She will start checking glucose and call in one week with update. Continue all other medications. She will bring in all her bottles at next visit as likely some of her issues are coming from polypharmacy. Med list was updated today. Patient was counseled on smoking cessation and will call if he/she wishes to quit       Problem list reviewed andsimplified/updated  HM reviewed today and counseled as appropriate    Call or go to ED immediately if symptoms worsen or persist.  Future Appointments   Date Time Provider Melanie Sellers   5/10/2019  2:00 PM Rachel Morrow [address]   5/30/2019 12:45 PM DO Edwin Acosta [facility]   9/26/2019  2:00 PM Rachel Grayson MD [facility]     Or sooner if necessary. Educational materials and/or homeexercises printed for patient's review and were included in patient instructions on his/her After Visit Summary and given to patient at the end of visit.       Counseled regarding above diagnosis, including possible risks and complications,  especially if left uncontrolled.     Counseled regarding the possible side effects, risks, benefits and alternatives to treatment; patient and/or guardian verbalizes understanding, agrees,feels comfortable with and wishes to proceed with above treatment plan.     Advised patient to call Aparna Fuentes new medication issues, and read all Rx info from pharmacy to assure aware of all possible risks and side effects of medication before taking.     Reviewed age and gender appropriate health screening exams and vaccinations.   Advised patient regarding importance of keeping up with recommended health maintenance and toschedule as soon as possible if overdue, as this is important in assessing for undiagnosed pathology, especially cancer, as well as protecting against potentially harmful/life threatening disease.          Patient and/or guardian verbalizes understanding and agrees with above counseling, assessment and plan.     All questions answered. Judith 5, DO  4/26/19    NOTE: This report was transcribed using voice recognition software.  Every effort was made to ensure accuracy; however, inadvertent computerized transcription errors may be present

## 2019-04-27 VITALS
BODY MASS INDEX: 47.39 KG/M2 | WEIGHT: 251 LBS | HEIGHT: 61 IN | RESPIRATION RATE: 16 BRPM | TEMPERATURE: 97 F | OXYGEN SATURATION: 91 % | HEART RATE: 122 BPM | DIASTOLIC BLOOD PRESSURE: 80 MMHG | SYSTOLIC BLOOD PRESSURE: 138 MMHG

## 2019-04-30 NOTE — TELEPHONE ENCOUNTER
Patient called in regards to a call that she got , but was seen here in the office on 4/26/19 and her insulin and blood sugars were discussed a that time

## 2019-05-01 ENCOUNTER — TELEPHONE (OUTPATIENT)
Dept: CARDIOLOGY CLINIC | Age: 64
End: 2019-05-01

## 2019-05-01 ENCOUNTER — TELEPHONE (OUTPATIENT)
Dept: VASCULAR SURGERY | Age: 64
End: 2019-05-01

## 2019-05-01 DIAGNOSIS — M79.10 MUSCLE PAIN: Primary | ICD-10-CM

## 2019-05-01 DIAGNOSIS — I50.42 CHRONIC COMBINED SYSTOLIC AND DIASTOLIC HEART FAILURE (HCC): ICD-10-CM

## 2019-05-01 NOTE — TELEPHONE ENCOUNTER
Pt phoned stating she was recently hospitalized and has had some extensive bruising over her thighs and IV site, she is on Plavix and received Lovenox while admitted. I called patient    She is very bruised at her IV site. She is also bruised in her belly where her lovenox shots were. She is sore at these areas. Blue and black    I explained to her that this is not unusual considering she is on plavix also. Explained that she remains on plavix as she continues to smoke.       She will see me Monday in office    Maday Dietrich

## 2019-05-02 NOTE — TELEPHONE ENCOUNTER
Additionally, per Dr. Poon Backers note from 2/11/19:  \"I would recommend another 3 months of DAPT therapy before holding for 7 days prior to surgical procedure on her hand. She stable from the cardiology point of view and undergoing low risk surgery. No further cardiac work up is needed for prior to carpal tunnel surgery and trigger finger release surgery. \"    Left message for patient to call the office.

## 2019-05-03 NOTE — TELEPHONE ENCOUNTER
Patient notified of Dr. Zac Dozier recommendation. She states she only took (1) Bumex yesterday and still had muscle spasms. Please advise.

## 2019-05-03 NOTE — TELEPHONE ENCOUNTER
Patient notified of Dr. Boateng's recommendations. Order for [facility] and Mag faxed to [facility]. Chart amended to reflect dose adjustment.

## 2019-05-10 ENCOUNTER — TELEPHONE (OUTPATIENT)
Dept: PHYSICAL MEDICINE AND REHAB | Age: 64
End: 2019-05-10

## 2019-05-10 NOTE — TELEPHONE ENCOUNTER
Patient no showed today for her injection with Dr Adolph Pickering and also on 4/15/19. Formal letter will be sent to house via mail stating our policy as she is only permitted to no show one more time (same-day cancellations count as no shows) or she will be dismissed from the practice.  Letter will be cc to PCP

## 2019-05-14 RX ORDER — PEN NEEDLE, DIABETIC 31 GX5/16"
NEEDLE, DISPOSABLE MISCELLANEOUS
Qty: 100 EACH | Refills: 3 | Status: SHIPPED | OUTPATIENT
Start: 2019-05-14 | End: 2019-06-26 | Stop reason: SDUPTHER

## 2019-05-14 RX ORDER — ASPIRIN 81 MG/1
TABLET, CHEWABLE ORAL
Qty: 30 TABLET | Refills: 2 | Status: SHIPPED | OUTPATIENT
Start: 2019-05-14 | End: 2019-06-26 | Stop reason: SDUPTHER

## 2019-05-31 ENCOUNTER — TELEPHONE (OUTPATIENT)
Dept: ADMINISTRATIVE | Age: 64
End: 2019-05-31

## 2019-06-07 ENCOUNTER — TELEPHONE (OUTPATIENT)
Dept: CARDIOLOGY CLINIC | Age: 64
End: 2019-06-07

## 2019-06-13 RX ORDER — OXYBUTYNIN CHLORIDE 5 MG/1
TABLET, EXTENDED RELEASE ORAL
Qty: 90 TABLET | Refills: 0 | Status: SHIPPED | OUTPATIENT
Start: 2019-06-13 | End: 2019-06-26

## 2019-06-16 ENCOUNTER — HOSPITAL ENCOUNTER (OUTPATIENT)
Age: 64
Discharge: HOME | End: 2019-06-16
Payer: MEDICARE

## 2019-06-16 LAB
ANION GAP SERPL CALCULATED.3IONS-SCNC: 8 MMOL/L (ref 7–16)
BASOPHILS ABSOLUTE: 0.02 E9/L (ref 0–0.2)
BASOPHILS RELATIVE PERCENT: 0.3 % (ref 0–2)
BUN BLDV-MCNC: 20 MG/DL (ref 8–23)
CALCIUM SERPL-MCNC: 9.1 MG/DL (ref 8.6–10.2)
CHLORIDE BLD-SCNC: 104 MMOL/L (ref 98–107)
CO2: 31 MMOL/L (ref 22–29)
CREAT SERPL-MCNC: 1.1 MG/DL (ref 0.5–1)
EOSINOPHILS ABSOLUTE: 0.12 E9/L (ref 0.05–0.5)
EOSINOPHILS RELATIVE PERCENT: 1.6 % (ref 0–6)
GFR AFRICAN AMERICAN: >60
GFR NON-AFRICAN AMERICAN: >60 ML/MIN/1.73
GLUCOSE BLD-MCNC: 108 MG/DL (ref 74–99)
HCT VFR BLD CALC: 46 % (ref 34–48)
HEMOGLOBIN: 13.9 G/DL (ref 11.5–15.5)
IMMATURE GRANULOCYTES #: 0.03 E9/L
IMMATURE GRANULOCYTES %: 0.4 % (ref 0–5)
LYMPHOCYTES ABSOLUTE: 0.86 E9/L (ref 1.5–4)
LYMPHOCYTES RELATIVE PERCENT: 11.3 % (ref 20–42)
MCH RBC QN AUTO: 30.5 PG (ref 26–35)
MCHC RBC AUTO-ENTMCNC: 30.2 % (ref 32–34.5)
MCV RBC AUTO: 101.1 FL (ref 80–99.9)
MONOCYTES ABSOLUTE: 0.35 E9/L (ref 0.1–0.95)
MONOCYTES RELATIVE PERCENT: 4.6 % (ref 2–12)
NEUTROPHILS ABSOLUTE: 6.24 E9/L (ref 1.8–7.3)
NEUTROPHILS RELATIVE PERCENT: 81.8 % (ref 43–80)
PDW BLD-RTO: 13.8 FL (ref 11.5–15)
PHOSPHORUS: 3.8 MG/DL (ref 2.5–4.5)
PLATELET # BLD: 249 E9/L (ref 130–450)
PMV BLD AUTO: 10.1 FL (ref 7–12)
POTASSIUM SERPL-SCNC: 5.5 MMOL/L (ref 3.5–5)
RBC # BLD: 4.55 E12/L (ref 3.5–5.5)
SODIUM BLD-SCNC: 143 MMOL/L (ref 132–146)
WBC # BLD: 7.6 E9/L (ref 4.5–11.5)

## 2019-06-16 PROCEDURE — 85025 COMPLETE CBC W/AUTO DIFF WBC: CPT

## 2019-06-16 PROCEDURE — 84100 ASSAY OF PHOSPHORUS: CPT

## 2019-06-16 PROCEDURE — 80048 BASIC METABOLIC PNL TOTAL CA: CPT

## 2019-06-16 PROCEDURE — 36415 COLL VENOUS BLD VENIPUNCTURE: CPT

## 2019-06-18 NOTE — TELEPHONE ENCOUNTER
Pt taking 36 units of Humalog 3 times daily and 40 units of Tresiba twice daily. BS Readings:    6/11:   135   58   170  6/12     76   98  6/15     106   109   132  6/17     93   101   140   85   81  6/18     63    Pt also states she has a persistent cough x 1 week, bringing up clear mucus. Takes zyrtec daily.

## 2019-06-21 ENCOUNTER — APPOINTMENT (OUTPATIENT)
Dept: GENERAL RADIOLOGY | Age: 64
End: 2019-06-21
Payer: MEDICARE

## 2019-06-21 ENCOUNTER — HOSPITAL ENCOUNTER (EMERGENCY)
Age: 64
Discharge: HOME | End: 2019-06-21
Attending: EMERGENCY MEDICINE
Payer: MEDICARE

## 2019-06-21 VITALS
RESPIRATION RATE: 16 BRPM | BODY MASS INDEX: 47.2 KG/M2 | OXYGEN SATURATION: 95 % | HEIGHT: 61 IN | DIASTOLIC BLOOD PRESSURE: 88 MMHG | TEMPERATURE: 98.2 F | HEART RATE: 71 BPM | SYSTOLIC BLOOD PRESSURE: 126 MMHG | WEIGHT: 250 LBS

## 2019-06-21 DIAGNOSIS — T07.XXXA MULTIPLE CONTUSIONS: ICD-10-CM

## 2019-06-21 DIAGNOSIS — S80.01XA CONTUSION OF RIGHT KNEE, INITIAL ENCOUNTER: Primary | ICD-10-CM

## 2019-06-21 DIAGNOSIS — S30.0XXA CONTUSION OF COCCYX, INITIAL ENCOUNTER: ICD-10-CM

## 2019-06-21 PROCEDURE — 73564 X-RAY EXAM KNEE 4 OR MORE: CPT

## 2019-06-21 PROCEDURE — 96372 THER/PROPH/DIAG INJ SC/IM: CPT

## 2019-06-21 PROCEDURE — 72220 X-RAY EXAM SACRUM TAILBONE: CPT

## 2019-06-21 PROCEDURE — 6360000002 HC RX W HCPCS: Performed by: EMERGENCY MEDICINE

## 2019-06-21 PROCEDURE — 99283 EMERGENCY DEPT VISIT LOW MDM: CPT

## 2019-06-21 RX ORDER — MELOXICAM 7.5 MG/1
7.5 TABLET ORAL DAILY
Qty: 30 TABLET | Refills: 0 | Status: SHIPPED | OUTPATIENT
Start: 2019-06-21 | End: 2019-08-16 | Stop reason: SDUPTHER

## 2019-06-21 RX ORDER — MEPERIDINE HYDROCHLORIDE 50 MG/ML
50 INJECTION INTRAMUSCULAR; INTRAVENOUS; SUBCUTANEOUS ONCE
Status: COMPLETED | OUTPATIENT
Start: 2019-06-21 | End: 2019-06-21

## 2019-06-21 RX ORDER — PROMETHAZINE HYDROCHLORIDE 25 MG/ML
25 INJECTION, SOLUTION INTRAMUSCULAR; INTRAVENOUS ONCE
Status: COMPLETED | OUTPATIENT
Start: 2019-06-21 | End: 2019-06-21

## 2019-06-21 RX ADMIN — PROMETHAZINE HYDROCHLORIDE 25 MG: 25 INJECTION INTRAMUSCULAR; INTRAVENOUS at 15:55

## 2019-06-21 RX ADMIN — MEPERIDINE HYDROCHLORIDE 50 MG: 50 INJECTION, SOLUTION INTRAMUSCULAR; INTRAVENOUS; SUBCUTANEOUS at 15:55

## 2019-06-21 ASSESSMENT — PAIN DESCRIPTION - PAIN TYPE: TYPE: ACUTE PAIN

## 2019-06-21 ASSESSMENT — PAIN DESCRIPTION - DESCRIPTORS: DESCRIPTORS: SHARP

## 2019-06-21 ASSESSMENT — PAIN SCALES - GENERAL
PAINLEVEL_OUTOF10: 10
PAINLEVEL_OUTOF10: 10

## 2019-06-21 ASSESSMENT — PAIN DESCRIPTION - ORIENTATION: ORIENTATION: RIGHT

## 2019-06-21 NOTE — ED PROVIDER NOTES
HPI:  6/21/19,   Time: 2:30 PM         Jamee Mccormick is a 58 y.o. female presenting to the ED for a fall with an injury to the right shoulder right elbow the coccyx and the right knee, beginning 2d ago. The complaint has been persistent, moderate in severity, and worsened by movement of the affected areas. ROS:   Pertinent positives and negatives are stated within HPI, all other systems reviewed and are negative.  --------------------------------------------- PAST HISTORY ---------------------------------------------  Past Medical History:  has a past medical history of Asthma, Atherosclerosis of native artery of left leg with rest pain ([address] C. difficile diarrhea, CAD (coronary artery disease), Cellulitis and abscess of trunk, Cerebellar infarct (HCC), Chronic back pain, Chronic kidney disease, Chronic systolic congestive heart failure ([address] Chronic venous insufficiency, COPD (chronic obstructive pulmonary disease) ([address] Depression, Diabetes mellitus ([address] Diabetic neuropathy ([address] Diverticulosis, Fatty liver, Glaucoma, open angle, Hepatic encephalopathy ([address] Hiatal hernia, History of blood transfusion, Hyperlipidemia, Hyperplastic colon polyp, Hypertension, Incontinence, Liver mass, Morbid obesity ([address] Movement disorder, Myocardial infarction ([address] Osteoarthritis, generalized, Pain in both lower legs, Peripheral vascular disease ([address] Pneumonia, Pulmonary edema, PVD (peripheral vascular disease) with claudication ([address] Sleep apnea, Tobacco abuse, Tobacco abuse, Tubular adenoma polyp of rectum, Urinary tract infection due to ESBL Klebsiella, and Ventral hernia. Past Surgical History:  has a past surgical history that includes knee surgery; Upper gastrointestinal endoscopy (12/20/12); Coronary artery bypass graft; layer wound closure (Left, [id number]); Echo Complete (10/9/2013); polysomnography (1/2016); liver biopsy (1/8/2016); bronchial brush biopsy (1/25/2013);  Breast surgery (2000); Normal      ---------------------------------------------------PHYSICAL EXAM--------------------------------------      Constitutional/General: Alert and oriented x3, well appearing, non toxic in NAD  Head: NC/AT  Eyes: PERRL, EOMI    Neck: Supple, full ROM, no meningeal signs  Pulmonary: Lungs clear to auscultation bilaterally, no wheezes, rales, or rhonchi. Not in respiratory distress  Cardiovascular:  Regular rate and rhythm, no murmurs, gallops, or rubs. 2+ distal pulses  Abdomen: Soft, non tender, non distended,   Extremities: Moves all extremities x 4. Warm and well perfused; there is tenderness on palpation of the right anterior knee the right lateral hip as well as the tailbone  Skin: warm and dry without rash  Neurologic: GCS 15,  Psych: Normal Affect      ------------------------------ ED COURSE/MEDICAL DECISION MAKING----------------------  Medications - No data to display      Medical Decision Making:          Counseling: The emergency provider has spoken with the patient and family member patient and daughter and discussed todays results, in addition to providing specific details for the plan of care and counseling regarding the diagnosis and prognosis. Questions are answered at this time and they are agreeable with the plan.      --------------------------------- IMPRESSION AND DISPOSITION ---------------------------------    IMPRESSION  1. Contusion of right knee, initial encounter    2. Multiple contusions    3.  Contusion of coccyx, initial encounter        DISPOSITION  Disposition: Discharge to home  Patient condition is stable                  Pavithra Guillermo MD  06/21/19 412 Rosemary Yoon MD  06/22/19 [id number]

## 2019-06-26 ENCOUNTER — OFFICE VISIT (OUTPATIENT)
Dept: FAMILY MEDICINE CLINIC | Age: 64
End: 2019-06-26
Payer: MEDICARE

## 2019-06-26 VITALS
RESPIRATION RATE: 20 BRPM | HEART RATE: 89 BPM | DIASTOLIC BLOOD PRESSURE: 89 MMHG | HEIGHT: 62 IN | BODY MASS INDEX: 46.01 KG/M2 | SYSTOLIC BLOOD PRESSURE: 157 MMHG | WEIGHT: 250 LBS

## 2019-06-26 DIAGNOSIS — J96.12 CHRONIC RESPIRATORY FAILURE WITH HYPOXIA AND HYPERCAPNIA (HCC): Primary | ICD-10-CM

## 2019-06-26 DIAGNOSIS — E11.51 TYPE 2 DIABETES MELLITUS WITH DIABETIC PERIPHERAL ANGIOPATHY WITHOUT GANGRENE, WITH LONG-TERM CURRENT USE OF INSULIN (HCC): ICD-10-CM

## 2019-06-26 DIAGNOSIS — R53.83 FATIGUE, UNSPECIFIED TYPE: ICD-10-CM

## 2019-06-26 DIAGNOSIS — J44.9 OSA AND COPD OVERLAP SYNDROME (HCC): ICD-10-CM

## 2019-06-26 DIAGNOSIS — Z79.4 TYPE 2 DIABETES MELLITUS WITH DIABETIC PERIPHERAL ANGIOPATHY WITHOUT GANGRENE, WITH LONG-TERM CURRENT USE OF INSULIN (HCC): ICD-10-CM

## 2019-06-26 DIAGNOSIS — I50.42 CHRONIC COMBINED SYSTOLIC AND DIASTOLIC HEART FAILURE (HCC): ICD-10-CM

## 2019-06-26 DIAGNOSIS — R73.9 HYPERGLYCEMIA: ICD-10-CM

## 2019-06-26 DIAGNOSIS — J96.11 CHRONIC RESPIRATORY FAILURE WITH HYPOXIA AND HYPERCAPNIA (HCC): Primary | ICD-10-CM

## 2019-06-26 DIAGNOSIS — G47.33 OSA AND COPD OVERLAP SYNDROME (HCC): ICD-10-CM

## 2019-06-26 LAB — HBA1C MFR BLD: 5.8 %

## 2019-06-26 PROCEDURE — 2022F DILAT RTA XM EVC RTNOPTHY: CPT | Performed by: FAMILY MEDICINE

## 2019-06-26 PROCEDURE — 3044F HG A1C LEVEL LT 7.0%: CPT | Performed by: FAMILY MEDICINE

## 2019-06-26 PROCEDURE — 3017F COLORECTAL CA SCREEN DOC REV: CPT | Performed by: FAMILY MEDICINE

## 2019-06-26 PROCEDURE — G8417 CALC BMI ABV UP PARAM F/U: HCPCS | Performed by: FAMILY MEDICINE

## 2019-06-26 PROCEDURE — 3023F SPIROM DOC REV: CPT | Performed by: FAMILY MEDICINE

## 2019-06-26 PROCEDURE — 83036 HEMOGLOBIN GLYCOSYLATED A1C: CPT | Performed by: FAMILY MEDICINE

## 2019-06-26 PROCEDURE — 4004F PT TOBACCO SCREEN RCVD TLK: CPT | Performed by: FAMILY MEDICINE

## 2019-06-26 PROCEDURE — G8427 DOCREV CUR MEDS BY ELIG CLIN: HCPCS | Performed by: FAMILY MEDICINE

## 2019-06-26 PROCEDURE — 99215 OFFICE O/P EST HI 40 MIN: CPT | Performed by: FAMILY MEDICINE

## 2019-06-26 PROCEDURE — G8926 SPIRO NO PERF OR DOC: HCPCS | Performed by: FAMILY MEDICINE

## 2019-06-26 PROCEDURE — G8598 ASA/ANTIPLAT THER USED: HCPCS | Performed by: FAMILY MEDICINE

## 2019-06-26 RX ORDER — DICYCLOMINE HYDROCHLORIDE 10 MG/1
10 CAPSULE ORAL DAILY
Refills: 2 | COMMUNITY
Start: 2019-06-03 | End: 2019-06-26 | Stop reason: DRUGHIGH

## 2019-06-26 RX ORDER — SIMVASTATIN 20 MG
20 TABLET ORAL DAILY
Refills: 3 | COMMUNITY
Start: 2019-05-02 | End: 2019-06-26 | Stop reason: SDUPTHER

## 2019-06-26 RX ORDER — SIMVASTATIN 20 MG
20 TABLET ORAL DAILY
Qty: 30 TABLET | Refills: 3 | Status: SHIPPED | OUTPATIENT
Start: 2019-06-26 | End: 2019-07-25

## 2019-06-26 RX ORDER — NITROGLYCERIN 0.4 MG/1
TABLET SUBLINGUAL
Qty: 25 TABLET | Refills: 3 | Status: SHIPPED
Start: 2019-06-26 | End: 2020-07-17 | Stop reason: SDUPTHER

## 2019-06-26 RX ORDER — CLOPIDOGREL BISULFATE 75 MG/1
75 TABLET ORAL DAILY
Qty: 30 TABLET | Refills: 3 | Status: SHIPPED | OUTPATIENT
Start: 2019-06-26 | End: 2019-09-07 | Stop reason: SDUPTHER

## 2019-06-26 RX ORDER — DULOXETIN HYDROCHLORIDE 60 MG/1
CAPSULE, DELAYED RELEASE ORAL
Qty: 180 CAPSULE | Refills: 1 | Status: SHIPPED | OUTPATIENT
Start: 2019-06-26 | End: 2019-09-24 | Stop reason: SDUPTHER

## 2019-06-26 RX ORDER — PANTOPRAZOLE SODIUM 40 MG/1
TABLET, DELAYED RELEASE ORAL
Qty: 30 TABLET | Refills: 2 | Status: SHIPPED
Start: 2019-06-26 | End: 2020-07-17 | Stop reason: SDUPTHER

## 2019-06-26 RX ORDER — CLOTRIMAZOLE AND BETAMETHASONE DIPROPIONATE 10; .64 MG/G; MG/G
CREAM TOPICAL
Qty: 1 TUBE | Refills: 0 | Status: CANCELLED | OUTPATIENT
Start: 2019-06-26

## 2019-06-26 RX ORDER — MONTELUKAST SODIUM 10 MG/1
10 TABLET ORAL NIGHTLY
Qty: 30 TABLET | Refills: 2 | Status: SHIPPED | OUTPATIENT
Start: 2019-06-26 | End: 2019-07-26 | Stop reason: SDUPTHER

## 2019-06-26 RX ORDER — TRAZODONE HYDROCHLORIDE 100 MG/1
TABLET ORAL
Qty: 90 TABLET | Refills: 1 | Status: SHIPPED | OUTPATIENT
Start: 2019-06-26 | End: 2019-10-09 | Stop reason: SDUPTHER

## 2019-06-26 RX ORDER — FLUTICASONE PROPIONATE 50 MCG
1 SPRAY, SUSPENSION (ML) NASAL 2 TIMES DAILY
Qty: 1 BOTTLE | Refills: 3 | Status: SHIPPED | OUTPATIENT
Start: 2019-06-26 | End: 2019-10-04 | Stop reason: SDUPTHER

## 2019-06-26 RX ORDER — CETIRIZINE HYDROCHLORIDE 10 MG/1
10 TABLET ORAL DAILY
Qty: 30 TABLET | Refills: 3 | Status: SHIPPED | OUTPATIENT
Start: 2019-06-26 | End: 2019-07-26 | Stop reason: SDUPTHER

## 2019-06-26 RX ORDER — ISOSORBIDE MONONITRATE 60 MG/1
60 TABLET, EXTENDED RELEASE ORAL DAILY
Qty: 90 TABLET | Refills: 3 | Status: SHIPPED | OUTPATIENT
Start: 2019-06-26 | End: 2019-11-01 | Stop reason: SDUPTHER

## 2019-06-26 RX ORDER — DIPHENOXYLATE HYDROCHLORIDE AND ATROPINE SULFATE 2.5; .025 MG/1; MG/1
1 TABLET ORAL 4 TIMES DAILY PRN
Status: CANCELLED | OUTPATIENT
Start: 2019-06-26

## 2019-06-26 RX ORDER — TIOTROPIUM BROMIDE INHALATION SPRAY 1.56 UG/1
SPRAY, METERED RESPIRATORY (INHALATION)
Qty: 1 INHALER | Refills: 5 | Status: SHIPPED
Start: 2019-06-26 | End: 2020-03-04

## 2019-06-26 RX ORDER — LANCETS
EACH MISCELLANEOUS
Qty: 100 EACH | Refills: 2 | Status: SHIPPED | OUTPATIENT
Start: 2019-06-26 | End: 2020-01-21 | Stop reason: SDUPTHER

## 2019-06-26 RX ORDER — BUMETANIDE 1 MG/1
1 TABLET ORAL DAILY
Qty: 90 TABLET | Refills: 1 | Status: SHIPPED | OUTPATIENT
Start: 2019-06-26 | End: 2019-10-09 | Stop reason: SDUPTHER

## 2019-06-26 RX ORDER — LIDOCAINE 50 MG/G
OINTMENT TOPICAL
Qty: 35.44 G | Refills: 11 | Status: SHIPPED
Start: 2019-06-26 | End: 2020-07-17 | Stop reason: SDUPTHER

## 2019-06-26 RX ORDER — TIOTROPIUM BROMIDE INHALATION SPRAY 1.56 UG/1
SPRAY, METERED RESPIRATORY (INHALATION)
Refills: 10 | COMMUNITY
Start: 2019-05-31 | End: 2019-06-26 | Stop reason: SDUPTHER

## 2019-06-26 RX ORDER — HYDRALAZINE HYDROCHLORIDE 100 MG/1
100 TABLET, FILM COATED ORAL 3 TIMES DAILY
Qty: 270 TABLET | Refills: 3 | Status: CANCELLED | OUTPATIENT
Start: 2019-06-26

## 2019-06-26 RX ORDER — MELATONIN
1000 DAILY
Qty: 90 TABLET | Refills: 1 | Status: SHIPPED | OUTPATIENT
Start: 2019-06-26 | End: 2019-10-04 | Stop reason: SDUPTHER

## 2019-06-26 RX ORDER — AZELASTINE HYDROCHLORIDE 137 UG/1
1 SPRAY, METERED NASAL 3 TIMES DAILY PRN
Refills: 3 | Status: CANCELLED | OUTPATIENT
Start: 2019-06-26

## 2019-06-26 RX ORDER — QUETIAPINE FUMARATE 25 MG/1
TABLET, FILM COATED ORAL
Qty: 90 TABLET | Refills: 0 | Status: CANCELLED | OUTPATIENT
Start: 2019-06-26

## 2019-06-26 RX ORDER — OXYBUTYNIN CHLORIDE 10 MG/1
10 TABLET, EXTENDED RELEASE ORAL DAILY
Refills: 11 | COMMUNITY
Start: 2019-05-04 | End: 2019-06-26

## 2019-06-26 RX ORDER — ASPIRIN 81 MG/1
TABLET, CHEWABLE ORAL
Qty: 30 TABLET | Refills: 2 | Status: SHIPPED | OUTPATIENT
Start: 2019-06-26 | End: 2019-10-04 | Stop reason: SDUPTHER

## 2019-06-26 RX ORDER — DICYCLOMINE HYDROCHLORIDE 10 MG/1
10 CAPSULE ORAL 2 TIMES DAILY PRN
Qty: 120 CAPSULE | Refills: 2 | Status: SHIPPED | COMMUNITY
Start: 2019-06-26 | End: 2020-03-03

## 2019-06-26 RX ORDER — METOPROLOL SUCCINATE 25 MG/1
25 TABLET, EXTENDED RELEASE ORAL 2 TIMES DAILY
Qty: 60 TABLET | Refills: 5 | Status: SHIPPED | OUTPATIENT
Start: 2019-06-26 | End: 2019-11-12 | Stop reason: SDUPTHER

## 2019-06-26 NOTE — PROGRESS NOTES
Jena Min  : 1955    Chief Complaint:     Chief Complaint   Patient presents with    Discuss Medications     flonase,mobic,oxybutin,gabapentin     Medication Refill       HPI  Jena Min 58 y.o. presents for   Chief Complaint   Patient presents with    Discuss Medications     flonase,mobic,oxybutin,gabapentin     Medication Refill     She states that she is taking too many medications and wishes for these to be adjusted. However after discussing her medications in detail patient is not willing to stop many of these medications. I did discuss that most of these medication she has to continue to take. Also we did try to decrease her dose of insulin as her A1c is well controlled however patient decided to increase her insulin on her own. Not had any episodes of hypoglycemia recently. She is currently taking Tresiba 50 units at night and Humalog 80 units with meals. She states she did try to take 20 units with meals but had too high of sugars though she does not know what her numbers actually were. States the oxybutynin is not helping her incontinence and wishes to try Myrbetriq instead. Also she had a fall out of her bed and believes this may be due to the gabapentin. Discussed that we can slowly wean off of the gabapentin and see how she does. He continues to complain of pain. She did get her marijuana card and has been using this. She also has been continue to smoke cigarettes. Discussed this in detail with patient today that her medical problems will not improve if she does not stop smoking. She understands. All questions were answered to patients satisfaction.     Past Medical History:   Diagnosis Date    Asthma     Atherosclerosis of native artery of left leg with rest pain ([facility] 2018    C. difficile diarrhea     CAD (coronary artery disease)     Cellulitis and abscess of trunk 2015    Cerebellar infarct ([facility] 4/3/2019    Remote, rt. cerebellum, head CT scan, 1/9/19    Chronic back pain     Chronic kidney disease     Chronic systolic congestive heart failure ([address] 10/4/2013    Chronic venous insufficiency 10/11/2017    COPD (chronic obstructive pulmonary disease) (HCC)     Depression     Diabetes mellitus (HCC)     Diabetic neuropathy ([address]     Diverticulosis     Fatty liver 1/8/2016    per US    Glaucoma, open angle 2/1/2016    Mild-OU    Hepatic encephalopathy ([address] 02/07/2016    resolved    Hiatal hernia     History of blood transfusion     Hyperlipidemia     Hyperplastic colon polyp     Hypertension     Incontinence     Liver mass     Morbid obesity ([address]     Movement disorder     Myocardial infarction ([address] 2011    Osteoarthritis, generalized     Pain in both lower legs 10/11/2017    Peripheral vascular disease ([address]     Pneumonia 1/26/2014    Pulmonary edema     resolved    PVD (peripheral vascular disease) with claudication ([address] 8/30/2017    Sleep apnea     uses BI PAP    Tobacco abuse     Tobacco abuse 10/11/2017    Tubular adenoma polyp of rectum     Urinary tract infection due to ESBL Klebsiella 03/08/2017    Ventral hernia        Past Surgical History:   Procedure Laterality Date    ANGIOPLASTY  11/13/2018    Dr. Dariana Giles & athrectomy L SFA & Popliteal    BREAST SURGERY  2000    bilateral reduction    BRONCHIAL BRUSH BIOPSY  1/25/2013    Dr Alfred Antonio  04/20/2015    Dr. Makayla Schaeffer  12/2011     DR. Roger Sofia,  follows Dr Oscar Smith  11/15/2013    Dr Mame Boyleoring COLONOSCOPY  12/23/2016    Dr Amber Stein adenoma & hyperplastic polyps, melanosis coli (repeat one year 12/2017)    CORONARY ARTERY BYPASS GRAFT      ECHO COMPLETE  10/9/2013         ENDOSCOPY, COLON, DIAGNOSTIC  04/18/2017    GALLBLADDER SURGERY      gallstones removed, [facility] 8/2017    HYSTERECTOMY      JOINT REPLACEMENT  2011    LEFT KNEE    KNEE SURGERY      left knee replacement    LAYER WOUND CLOSURE Left [id number]   [facility] LIVER BIOPSY  1/8/2016    [facility]    POLYSOMNOGRAPHY  1/2016    [facility]    UPPER GASTROINTESTINAL ENDOSCOPY  12/20/12    UPPER GASTROINTESTINAL ENDOSCOPY  12/23/2016    Dr Estella Lentz   [facility] ENDOSCOPY  02/08/2017       Social History     Socioeconomic History    Marital status:       Spouse name: None    Number of children: 2    Years of education: None    Highest education level: None   Occupational History    Occupation: disability   Social Needs    Financial resource strain: None    Food insecurity:     Worry: None     Inability: None    Transportation needs:     Medical: None     Non-medical: None   Tobacco Use    Smoking status: Current Every Day Smoker     Packs/day: 0.25     Years: 40.00     Pack years: 10.00     Types: Cigarettes     Start date: 8/10/1974    Smokeless tobacco: Never Used    Tobacco comment: 1 pack every 3 days (6-7 cig)   Substance and Sexual Activity    Alcohol use: No     Alcohol/week: 0.0 oz    Drug use: Yes     Types: Marijuana     Comment: \"every 4th or 5th day out of the week\"    Sexual activity: Never   Lifestyle    Physical activity:     Days per week: None     Minutes per session: None    Stress: None   Relationships    Social connections:     Talks on phone: None     Gets together: None     Attends Sikhism service: None     Active member of club or organization: None     Attends meetings of clubs or organizations: None     Relationship status: None    Intimate partner violence:     Fear of current or ex partner: None     Emotionally abused: None     Physically abused: None     Forced sexual activity: None   Other Topics Concern    None   Social History Narrative    Pt lives with brother in her house-pt has never driven a car and depends on transportation       Family History   Problem Relation Age of Onset    Cancer Mother     Asthma Father Current Outpatient Medications   Medication Sig Dispense Refill    insulin lispro (HUMALOG KWIKPEN) 100 UNIT/ML pen INJECT 15 UNITS SUBCUTANEOUSLY THREE TIMES A DAY BEFORE MEALS 15 mL 5    Alcohol Swabs ([employer]S ALCOHOL PREP SWABS) 70 % PADS Use daily 1 each 2    Insulin Pen Needle (B-D UF III MINI PEN NEEDLES) 31G X 5 MM MISC USE EVERY DAY AS DIRECTED 100 each 3    aspirin 81 MG chewable tablet TAKE 1 TABLET BY MOUTH DAILY 30 tablet 2    Insulin Degludec (TRESIBA FLEXTOUCH) 200 UNIT/ML SOPN Inject 50 Units into the skin nightly 15 pen 2    isosorbide mononitrate (IMDUR) 60 MG extended release tablet Take 1 tablet by mouth daily 90 tablet 3    sacubitril-valsartan (ENTRESTO) 24-26 MG per tablet Take 1 tablet by mouth 2 times daily 180 tablet 3    metoprolol succinate (TOPROL XL) 25 MG extended release tablet Take 1 tablet by mouth 2 times daily 60 tablet 5    blood glucose test strips (ACCU-CHEK COMPACT TEST DRUM) strip 1 each by In Vitro route 3 times daily As needed. 100 each 3    traZODone (DESYREL) 100 MG tablet TAKE 1 TABLET BY MOUTH AT NIGHT 90 tablet 1    cetirizine (ZYRTEC) 10 MG tablet Take 1 tablet by mouth daily 30 tablet 3    lidocaine (XYLOCAINE) 5 % ointment APPLY TOPICALLY AS NEEDED FOR PAIN 35.44 g 11    DULoxetine (CYMBALTA) 60 MG extended release capsule TAKE ONE CAPSULE BY MOUTH TWICE A  capsule 1    clopidogrel (PLAVIX) 75 MG tablet Take 1 tablet by mouth daily 30 tablet 3    bumetanide (BUMEX) 1 MG tablet Take 1 tablet by mouth daily 90 tablet 1    Nutritional Supplements (GLUCOSE MANAGEMENT) TABS Use daily prn if low glucose <60 30 tablet 2    nitroGLYCERIN (NITROSTAT) 0.4 MG SL tablet up to max of 3 total doses.  If no relief after 1 dose, call 911. 25 tablet 3    mometasone-formoterol (DULERA) 200-5 MCG/ACT inhaler Inhale 2 puffs into the lungs every 12 hours 1 Inhaler 2    ACCU-CHEK FASTCLIX LANCETS MISC Use 4x daily 100 each 2    pantoprazole (PROTONIX) 40 MG tablet TAKE 1 TABLET BY MOUTH TWICE A DAY 30 tablet 2    montelukast (SINGULAIR) 10 MG tablet Take 1 tablet by mouth nightly 30 tablet 2    fluticasone (FLONASE) 50 MCG/ACT nasal spray 1 spray by Nasal route 2 times daily 1 Bottle 3    hydrocortisone 2.5 % cream APPLY TO AFFECTED AREA TWICE DAILY 60 g 2    simvastatin (ZOCOR) 20 MG tablet Take 1 tablet by mouth daily 30 tablet 3    SPIRIVA RESPIMAT 1.25 MCG/ACT AERS inhaler INHALE TWO (2) PUFFS BY MOUTH EVERY DAY 1 Inhaler 5    dicyclomine (BENTYL) 10 MG capsule Take 1 capsule by mouth 2 times daily as needed 120 capsule 2    Mirabegron ER 50 MG TB24 Take 50 mg by mouth daily 30 tablet 2    Cholecalciferol (VITAMIN D3) 1000 units TABS Take 1 tablet by mouth daily 90 tablet 1    meloxicam (MOBIC) 7.5 MG tablet Take 1 tablet by mouth daily 30 tablet 0    [facility]S ARTHRITIS PAIN RELIEF 650 MG extended release tablet Take 1 tablet by mouth 3 times daily as needed  3    hydrALAZINE (APRESOLINE) 100 MG tablet Take 1 tablet by mouth 3 times daily 270 tablet 3    Insulin Syringe-Needle U-100 31G X 5/16\" 1 ML MISC 1 each by Does not apply route daily Use as directed 100 each 11    gabapentin (NEURONTIN) 300 MG capsule Take 600 mg by mouth 3 times daily. [place] Dark olopatadine (PATADAY) 0.2 % SOLN ophthalmic solution Place 1 drop into both eyes daily      metoclopramide (REGLAN) 5 MG tablet Take 5 mg by mouth See Admin Instructions 1 tab daily with each meal and two tabs nightly      diphenoxylate-atropine (LOMOTIL) 2.5-0.025 MG per tablet Take 1 tablet by mouth 4 times daily as needed for Diarrhea. [place] Dark Omega-3 Fatty Acids (FISH OIL OMEGA-3 PO) Take 1,000 mg by mouth daily       Multiple Vitamins-Minerals (THERAPEUTIC MULTIVITAMIN-MINERALS) tablet Take 1 tablet by mouth daily      albuterol (PROVENTIL) (2.5 MG/3ML) 0.083% nebulizer solution Take 2.5 mg by nebulization every 6 hours as needed for Wheezing      SOFT TOUCH LANCETS MISC Test 3 times daily 100 each 3  BiPAP Machine MISC by Does not apply route nightly 27/23       No current facility-administered medications for this visit. Allergies   Allergen Reactions    Latex Hives    Bee Venom Anaphylaxis    Dilaudid [Hydromorphone Hcl] Itching    Dye [Iodides] Hives and Shortness Of Breath    Percocet [Oxycodone-Acetaminophen] Shortness Of Breath and Itching    Keflex [Cephalexin] Itching and Rash    Lasix [Furosemide] Other (See Comments)     Pt states she cramps up and gets headaches    Levaquin [Levofloxacin In D5w] Hives    Lipitor      MUSCLE SPASMS    Lyrica [Pregabalin]      Dream disturbances    Morphine Hives    Naproxen      Unsure of reaction;pt states able to take Aleve without difficulties    Nefazodone Other (See Comments)    Norvasc [Amlodipine] Swelling    Oxycodone-Acetaminophen Swelling    Shellfish-Derived Products     Trazodone And Nefazodone        Health Maintenance Due   Topic Date Due    Hepatitis A vaccine (1 of 2 - Risk 2-dose series) 12/29/1956    Pneumococcal 0-64 years Vaccine (1 of 1 - PPSV23) 12/29/1960    Hepatitis B Vaccine (1 of 3 - Risk 3-dose series) 12/29/1973    DTaP/Tdap/Td vaccine (1 - Tdap) 12/29/1973    Shingles Vaccine (1 of 2) 12/29/2004    Diabetic retinal exam  05/13/2016    Cervical cancer screen  09/16/2016    Breast cancer screen  11/10/2018    Diabetic microalbuminuria test  05/09/2019    Lipid screen  06/05/2019           REVIEW OF SYSTEMS  Review of Systems   Constitutional: Negative for chills, fatigue and fever. Uses walker   HENT: Positive for postnasal drip. Negative for congestion, ear pain, rhinorrhea, sinus pressure, sinus pain, sneezing, sore throat and trouble swallowing. Eyes: Negative for pain. Respiratory: Positive for cough. Negative for shortness of breath. Mucus production   Cardiovascular: Negative for chest pain and leg swelling.    Gastrointestinal: Negative for abdominal pain, constipation, diarrhea, nausea and vomiting. Endocrine: Negative for cold intolerance and heat intolerance. Genitourinary: Negative for dysuria, frequency and urgency. Musculoskeletal: Positive for arthralgias, gait problem and neck pain. Negative for back pain and myalgias. Skin: Negative for rash. Allergic/Immunologic: Positive for environmental allergies. Negative for food allergies. Neurological: Negative for dizziness, tremors, syncope, light-headedness and headaches. Hematological: Negative for adenopathy. Does not bruise/bleed easily. Psychiatric/Behavioral: Positive for sleep disturbance. Negative for behavioral problems and dysphoric mood. The patient is not nervous/anxious. PHYSICAL EXAM  BP (!) 157/89   Pulse 89   Resp 20   Ht 5' 1.5\" (1.562 m)   Wt 250 lb (113.4 kg)   LMP 01/01/1990   BMI 46.47 kg/m²   Physical Exam   Constitutional: She is oriented to person, place, and time. She appears well-developed and well-nourished. Uses walker  Obese   HENT:   Head: Normocephalic and atraumatic. Right Ear: External ear normal.   Left Ear: External ear normal.   Nose: Nose normal.   Mouth/Throat: Oropharynx is clear and moist.   Eyes: Pupils are equal, round, and reactive to light. Conjunctivae and EOM are normal.   Neck: Normal range of motion. Neck supple. No JVD present. Cardiovascular: Normal rate, regular rhythm and normal heart sounds. No murmur heard. No edema, pvd changes noted on LE   Pulmonary/Chest: Effort normal. No respiratory distress. She has no wheezes. She has no rales. Abdominal: Soft. Bowel sounds are normal. There is no tenderness. Musculoskeletal: She exhibits tenderness (lumbar, cervical spinal musculatrue). Uses walker     Lymphadenopathy:     She has no cervical adenopathy. Neurological: She is alert and oriented to person, place, and time. Coordination normal.   Skin: Skin is warm and dry. No rash noted. Psychiatric: She has a normal mood and affect.  Her behavior is normal.   Nursing note and vitals reviewed. Laboratory: All laboratory and radiology results have been personally reviewed by myself    Lab Results   Component Value Date     06/16/2019    K 5.5 06/16/2019    K 4.4 04/18/2019     06/16/2019    CO2 31 06/16/2019    BUN 20 06/16/2019    CREATININE 1.1 06/16/2019    PROT 7.4 04/10/2019    LABALBU 3.8 04/10/2019    LABALBU 5.2 12/01/2011    CALCIUM 9.1 06/16/2019    GFRAA >60 06/16/2019    LABGLOM >60 06/16/2019    GLUCOSE 108 06/16/2019    GLUCOSE 181 12/01/2011    AST 23 04/10/2019    ALT 18 04/10/2019    ALKPHOS 74 04/10/2019    BILITOT 0.3 04/10/2019    TSH 1.560 11/07/2017    VITD25 33 12/01/2016    CHOL 222 06/05/2018    TRIG 84 06/05/2018    HDL 38 06/05/2018    LDLCALC 167 06/05/2018    LABA1C 5.8 06/26/2019        Lab Results   Component Value Date    CHOL 222 (H) 06/05/2018    CHOL 159 12/01/2016    CHOL 186 04/07/2016     Lab Results   Component Value Date    TRIG 84 06/05/2018    TRIG 215 (H) 12/01/2016    TRIG 298 (H) 04/07/2016     Lab Results   Component Value Date    HDL 38 06/05/2018    HDL 27 12/01/2016    HDL 26 04/07/2016     Lab Results   Component Value Date    LDLCALC 167 (H) 06/05/2018    LDLCALC 89 12/01/2016    LDLCALC 100 (H) 04/07/2016       Lab Results   Component Value Date    LABA1C 5.8 06/26/2019    LABA1C 6.2 04/26/2019    LABA1C 6.0 09/13/2018     Lab Results   Component Value Date    LABMICR 82.0 (H) 12/01/2016    LDLCALC 167 (H) 06/05/2018    CREATININE 1.1 (H) 06/16/2019       ASSESSMENT/PLAN:     Diagnosis Orders   1. Chronic respiratory failure with hypoxia and hypercapnia (HCC)     2. Hyperglycemia  POCT glycosylated hemoglobin (Hb A1C)   3. Fatigue, unspecified type  VITAMIN B12   4. Chronic combined systolic and diastolic heart failure ([address]     5. Type 2 diabetes mellitus with diabetic peripheral angiopathy without gangrene, with long-term current use of insulin ([facility]     6.  DAYAN and COPD health maintenance and toschedule as soon as possible if overdue, as this is important in assessing for undiagnosed pathology, especially cancer, as well as protecting against potentially harmful/life threatening disease.          Patient and/or guardian verbalizes understanding and agrees with above counseling, assessment and plan.     All questions answered. Judith 5, DO  6/26/19    NOTE: This report was transcribed using voice recognition software.  Every effort was made to ensure accuracy; however, inadvertent computerized transcription errors may be present

## 2019-06-27 ASSESSMENT — ENCOUNTER SYMPTOMS
SHORTNESS OF BREATH: 0
ABDOMINAL PAIN: 0
RHINORRHEA: 0
EYE PAIN: 0
SINUS PAIN: 0
SINUS PRESSURE: 0
VOMITING: 0
BACK PAIN: 0
TROUBLE SWALLOWING: 0
SORE THROAT: 0
DIARRHEA: 0
COUGH: 1
CONSTIPATION: 0
NAUSEA: 0

## 2019-07-02 ENCOUNTER — TELEPHONE (OUTPATIENT)
Dept: FAMILY MEDICINE CLINIC | Age: 64
End: 2019-07-02

## 2019-07-02 NOTE — TELEPHONE ENCOUNTER
Pt calling with blood sugars- 6/29/19 at 6:15p after eating a lot of fruit per patient it was 343, 7/1/ and today 134. She also states she is having a lot of heartburn but gastro  is out of office, would like something for this.  Call back # [phone]

## 2019-07-03 RX ORDER — RANITIDINE 150 MG/1
150 TABLET ORAL 2 TIMES DAILY
Qty: 60 TABLET | Refills: 3 | Status: SHIPPED | OUTPATIENT
Start: 2019-07-03 | End: 2019-10-16 | Stop reason: SDUPTHER

## 2019-07-05 NOTE — TELEPHONE ENCOUNTER
Patient called, per patient she increased her insulin on her own, she increased the humalog to 30 units 3x daily, and the tresiba to 60 uints nightly. Patient stated her blood sugars have been running high, patient was unable to accurately give BS log information, per patient she sttaed she has been waking up with cotton mouth and not feeling well which is why she increased her own insulin. She also stated she tried to call office and was not getting return calls. Went over call history with patient and returned calls to patient from office, every time patient called into the office, her call was returned in timely manner. Also went over A1C results with patient, and DM diet information. Mailed patient DM information to her home. Per patient fasting BS are as follows   7/1-224    7/2-175    7/3-177    7/4-147    7/5-197      Per PCP patient is to follow insulin as prescribed, that the only way she will be able to track BS arcuately and adjust insulin as needed.   Patient is to take humalog 15 units 3x daily and tresiba 60 units nightly, patient is to test BS fasting every morning and record BS, also with mealtime insulin, patient is to take BS before injecting insulin and record numbers, and again 2 hours after record BS numbers, patient is to call office in one week with BS numbers , patient voiced understanding of all

## 2019-07-10 RX ORDER — ROSUVASTATIN CALCIUM 5 MG/1
TABLET, COATED ORAL
Qty: 90 TABLET | Refills: 0 | Status: SHIPPED | OUTPATIENT
Start: 2019-07-10 | End: 2019-07-18 | Stop reason: SDUPTHER

## 2019-07-11 ENCOUNTER — HOSPITAL ENCOUNTER (OUTPATIENT)
Age: 64
Discharge: HOME | End: 2019-07-11
Payer: MEDICARE

## 2019-07-11 DIAGNOSIS — R53.83 FATIGUE, UNSPECIFIED TYPE: ICD-10-CM

## 2019-07-11 LAB
ANION GAP SERPL CALCULATED.3IONS-SCNC: 10 MMOL/L (ref 7–16)
BUN BLDV-MCNC: 25 MG/DL (ref 8–23)
CALCIUM SERPL-MCNC: 9 MG/DL (ref 8.6–10.2)
CHLORIDE BLD-SCNC: 102 MMOL/L (ref 98–107)
CO2: 31 MMOL/L (ref 22–29)
CREAT SERPL-MCNC: 1.3 MG/DL (ref 0.5–1)
GFR AFRICAN AMERICAN: 50
GFR NON-AFRICAN AMERICAN: 50 ML/MIN/1.73
GLUCOSE BLD-MCNC: 159 MG/DL (ref 74–99)
POTASSIUM SERPL-SCNC: 4.7 MMOL/L (ref 3.5–5)
SODIUM BLD-SCNC: 143 MMOL/L (ref 132–146)
VITAMIN B-12: 291 PG/ML (ref 211–946)

## 2019-07-11 PROCEDURE — 36415 COLL VENOUS BLD VENIPUNCTURE: CPT

## 2019-07-11 PROCEDURE — 82607 VITAMIN B-12: CPT

## 2019-07-11 PROCEDURE — 80048 BASIC METABOLIC PNL TOTAL CA: CPT

## 2019-07-17 ENCOUNTER — TELEPHONE (OUTPATIENT)
Dept: FAMILY MEDICINE CLINIC | Age: 64
End: 2019-07-17

## 2019-07-17 NOTE — TELEPHONE ENCOUNTER
Pt notified, she has to notify transportation 48 hrs in advanced.  Asked pt to see if family can take her sooner or if she worsens to call 911

## 2019-07-19 RX ORDER — ROSUVASTATIN CALCIUM 5 MG/1
TABLET, COATED ORAL
Qty: 90 TABLET | Refills: 0 | Status: SHIPPED | OUTPATIENT
Start: 2019-07-19 | End: 2019-11-12

## 2019-07-25 ENCOUNTER — OFFICE VISIT (OUTPATIENT)
Dept: FAMILY MEDICINE CLINIC | Age: 64
End: 2019-07-25
Payer: MEDICARE

## 2019-07-25 VITALS
BODY MASS INDEX: 45.51 KG/M2 | HEART RATE: 100 BPM | RESPIRATION RATE: 20 BRPM | HEIGHT: 62 IN | SYSTOLIC BLOOD PRESSURE: 162 MMHG | OXYGEN SATURATION: 91 % | WEIGHT: 247.3 LBS | DIASTOLIC BLOOD PRESSURE: 92 MMHG

## 2019-07-25 DIAGNOSIS — H66.002 NON-RECURRENT ACUTE SUPPURATIVE OTITIS MEDIA OF LEFT EAR WITHOUT SPONTANEOUS RUPTURE OF TYMPANIC MEMBRANE: ICD-10-CM

## 2019-07-25 DIAGNOSIS — J44.9 OSA AND COPD OVERLAP SYNDROME (HCC): ICD-10-CM

## 2019-07-25 DIAGNOSIS — J96.11 CHRONIC RESPIRATORY FAILURE WITH HYPOXIA AND HYPERCAPNIA (HCC): Primary | ICD-10-CM

## 2019-07-25 DIAGNOSIS — R19.7 DIARRHEA, UNSPECIFIED TYPE: ICD-10-CM

## 2019-07-25 DIAGNOSIS — J96.12 CHRONIC RESPIRATORY FAILURE WITH HYPOXIA AND HYPERCAPNIA (HCC): Primary | ICD-10-CM

## 2019-07-25 DIAGNOSIS — Z79.4 TYPE 2 DIABETES MELLITUS WITH DIABETIC PERIPHERAL ANGIOPATHY WITHOUT GANGRENE, WITH LONG-TERM CURRENT USE OF INSULIN (HCC): ICD-10-CM

## 2019-07-25 DIAGNOSIS — R10.84 GENERALIZED ABDOMINAL PAIN: ICD-10-CM

## 2019-07-25 DIAGNOSIS — E11.51 TYPE 2 DIABETES MELLITUS WITH DIABETIC PERIPHERAL ANGIOPATHY WITHOUT GANGRENE, WITH LONG-TERM CURRENT USE OF INSULIN (HCC): ICD-10-CM

## 2019-07-25 DIAGNOSIS — I73.9 PVD (PERIPHERAL VASCULAR DISEASE) (HCC): ICD-10-CM

## 2019-07-25 DIAGNOSIS — G47.33 OSA AND COPD OVERLAP SYNDROME (HCC): ICD-10-CM

## 2019-07-25 PROCEDURE — 99214 OFFICE O/P EST MOD 30 MIN: CPT | Performed by: FAMILY MEDICINE

## 2019-07-25 PROCEDURE — 2022F DILAT RTA XM EVC RTNOPTHY: CPT | Performed by: FAMILY MEDICINE

## 2019-07-25 PROCEDURE — 3017F COLORECTAL CA SCREEN DOC REV: CPT | Performed by: FAMILY MEDICINE

## 2019-07-25 PROCEDURE — G8598 ASA/ANTIPLAT THER USED: HCPCS | Performed by: FAMILY MEDICINE

## 2019-07-25 PROCEDURE — G8427 DOCREV CUR MEDS BY ELIG CLIN: HCPCS | Performed by: FAMILY MEDICINE

## 2019-07-25 PROCEDURE — 3023F SPIROM DOC REV: CPT | Performed by: FAMILY MEDICINE

## 2019-07-25 PROCEDURE — 3044F HG A1C LEVEL LT 7.0%: CPT | Performed by: FAMILY MEDICINE

## 2019-07-25 PROCEDURE — G8926 SPIRO NO PERF OR DOC: HCPCS | Performed by: FAMILY MEDICINE

## 2019-07-25 PROCEDURE — G8417 CALC BMI ABV UP PARAM F/U: HCPCS | Performed by: FAMILY MEDICINE

## 2019-07-25 PROCEDURE — 4004F PT TOBACCO SCREEN RCVD TLK: CPT | Performed by: FAMILY MEDICINE

## 2019-07-25 RX ORDER — PREDNISONE 50 MG/1
50 TABLET ORAL DAILY
Qty: 3 TABLET | Refills: 0 | Status: SHIPPED | OUTPATIENT
Start: 2019-07-25 | End: 2019-07-30

## 2019-07-25 RX ORDER — BACLOFEN 10 MG/1
TABLET ORAL
Qty: 30 TABLET | Refills: 0 | Status: SHIPPED | OUTPATIENT
Start: 2019-07-25 | End: 2019-09-07 | Stop reason: SDUPTHER

## 2019-07-25 RX ORDER — DOXYCYCLINE HYCLATE 100 MG
100 TABLET ORAL 2 TIMES DAILY
Qty: 20 TABLET | Refills: 0 | Status: SHIPPED | OUTPATIENT
Start: 2019-07-25 | End: 2019-08-04

## 2019-07-25 RX ORDER — GUAIFENESIN DEXTROMETHORPHAN HYDROBROMIDE ORAL SOLUTION 10; 100 MG/5ML; MG/5ML
10 SOLUTION ORAL EVERY 4 HOURS PRN
Qty: 100 ML | Refills: 0 | Status: ON HOLD | OUTPATIENT
Start: 2019-07-25 | End: 2019-09-24 | Stop reason: HOSPADM

## 2019-07-25 NOTE — PROGRESS NOTES
Nav Tsai  : 1955    Chief Complaint:     Chief Complaint   Patient presents with    Abdominal Pain    Other     1 mo follow up with chronic resp. failure       HPI  Nav Tsai 58 y.o. presents for   Chief Complaint   Patient presents with    Abdominal Pain    Other     1 mo follow up with chronic resp. failure     She continues to complain of chronic pain. She wishes for medication for this. Again I told her I do not prescribe chronic opioid therapy especially with her marijuana use. She again complains of cough. She continues to smoke cigarettes as well as marijuana. Discussed with her that this will only worsen the issue and this may not improve unless she quits. She also has ear pain that has been ongoing for the past week. She also complains of abdominal pain and diarrhea. She has bene having diarrhea for the past few days. This can be worse in the morning or afternoon. She has not had any new foods. The pain is located in the lower abdomen. All questions were answered to patients satisfaction.     Past Medical History:   Diagnosis Date    Asthma     Atherosclerosis of native artery of left leg with rest pain ([address] 2018    C. difficile diarrhea     CAD (coronary artery disease)     Cellulitis and abscess of trunk 2015    Cerebellar infarct ([address] 4/3/2019    Remote, rt. cerebellum, head CT scan, 19    Chronic back pain     Chronic kidney disease     Chronic systolic congestive heart failure ([address] 10/4/2013    Chronic venous insufficiency 10/11/2017    COPD (chronic obstructive pulmonary disease) ([address]     Depression     Diabetes mellitus ([address]     Diabetic neuropathy ([address]     Diverticulosis     Fatty liver 2016    per US    Glaucoma, open angle 2016    Mild-OU    Hepatic encephalopathy ([address] 2016    resolved    Hiatal hernia     History of blood transfusion     Hyperlipidemia     Hyperplastic colon polyp     resource strain: None    Food insecurity:     Worry: None     Inability: None    Transportation needs:     Medical: None     Non-medical: None   Tobacco Use    Smoking status: Current Every Day Smoker     Packs/day: 0.25     Years: 40.00     Pack years: 10.00     Types: Cigarettes     Start date: 8/10/1974    Smokeless tobacco: Never Used    Tobacco comment: 1 pack every 3 days (6-7 cig)   Substance and Sexual Activity    Alcohol use: No     Alcohol/week: 0.0 standard drinks    Drug use: Yes     Types: Marijuana     Comment: \"every 4th or 5th day out of the week\"    Sexual activity: Never   Lifestyle    Physical activity:     Days per week: None     Minutes per session: None    Stress: None   Relationships    Social connections:     Talks on phone: None     Gets together: None     Attends Anglican service: None     Active member of club or organization: None     Attends meetings of clubs or organizations: None     Relationship status: None    Intimate partner violence:     Fear of current or ex partner: None     Emotionally abused: None     Physically abused: None     Forced sexual activity: None   Other Topics Concern    None   Social History Narrative    Pt lives with brother in her house-pt has never driven a car and depends on transportation       Family History   Problem Relation Age of Onset    Cancer Mother     Asthma Father           Current Outpatient Medications   Medication Sig Dispense Refill    doxycycline hyclate (VIBRA-TABS) 100 MG tablet Take 1 tablet by mouth 2 times daily for 10 days 20 tablet 0    baclofen (LIORESAL) 10 MG tablet Take one tab po bid prn 30 tablet 0    predniSONE (DELTASONE) 50 MG tablet Take 1 tablet by mouth daily for 5 days 3 tablet 0    dextromethorphan-guaiFENesin (DIABETIC TUSSIN DM)  MG/5ML syrup Take 10 mLs by mouth every 4 hours as needed for Cough 100 mL 0    rosuvastatin (CRESTOR) 5 MG tablet TAKE 1 TABLET BY MOUTH EVERY DAY 90 tablet 0    Clostridium difficile EIA     She has oxygen at home but does not wear to the office O2 sat is good keep between 88-92 due to COPD. Will obtain CT and stool studies due to abdominal pain and diarrhea. Again discussed smoking cessation as cough/dyspnea will not improve unless she does quit. She does not believe smoking is contributing to this. I did advise if that is the case to discuss with her pulmonologist further. abx for ear. Problem list reviewed andsimplified/updated  HM reviewed today and counseled as appropriate    Call or go to ED immediately if symptoms worsen or persist.  Future Appointments   Date Time Provider Melanie Sellers   8/7/2019  9:30 AM SEB CT2 BD SEB[facility] CT [facility] Radiolog   8/29/2019  2:30 PM DO Edwin Cruz [facility]   9/26/2019  2:00 PM Anthony Ryan MD [facility]     Or sooner if necessary. Educational materials and/or homeexercises printed for patient's review and were included in patient instructions on his/her After Visit Summary and given to patient at the end of visit.       Counseled regarding above diagnosis, including possible risks and complications,  especially if left uncontrolled.     Counseled regarding the possible side effects, risks, benefits and alternatives to treatment; patient and/or guardian verbalizes understanding, agrees,feels comfortable with and wishes to proceed with above treatment plan.     Advised patient to call Kalina Weeks new medication issues, and read all Rx info from pharmacy to assure aware of all possible risks and side effects of medication before taking.     Reviewed age and gender appropriate health screening exams and vaccinations. Advised patient regarding importance of keeping up with recommended health maintenance and toschedule as soon as possible if overdue, as this is important in assessing for undiagnosed pathology, especially cancer, as well as protecting against potentially harmful/life threatening disease.

## 2019-07-26 RX ORDER — MONTELUKAST SODIUM 10 MG/1
10 TABLET ORAL NIGHTLY
Qty: 30 TABLET | Refills: 2 | Status: SHIPPED | OUTPATIENT
Start: 2019-07-26 | End: 2019-09-24 | Stop reason: SDUPTHER

## 2019-07-26 RX ORDER — CETIRIZINE HYDROCHLORIDE 10 MG/1
10 TABLET ORAL DAILY
Qty: 30 TABLET | Refills: 3 | Status: SHIPPED | OUTPATIENT
Start: 2019-07-26 | End: 2019-11-14 | Stop reason: SDUPTHER

## 2019-07-28 ASSESSMENT — ENCOUNTER SYMPTOMS
SINUS PAIN: 0
NAUSEA: 0
VOMITING: 0
SHORTNESS OF BREATH: 0
DIARRHEA: 1
COUGH: 1
CONSTIPATION: 0
SORE THROAT: 0
ABDOMINAL PAIN: 1
RHINORRHEA: 0
TROUBLE SWALLOWING: 0
EYE PAIN: 0
SINUS PRESSURE: 0
BACK PAIN: 0

## 2019-08-13 ENCOUNTER — TELEPHONE (OUTPATIENT)
Dept: FAMILY MEDICINE CLINIC | Age: 64
End: 2019-08-13

## 2019-08-16 RX ORDER — MELOXICAM 7.5 MG/1
7.5 TABLET ORAL DAILY
Qty: 90 TABLET | Refills: 0 | Status: SHIPPED | OUTPATIENT
Start: 2019-08-16 | End: 2019-11-13 | Stop reason: SDUPTHER

## 2019-08-20 ENCOUNTER — TELEPHONE (OUTPATIENT)
Dept: FAMILY MEDICINE CLINIC | Age: 64
End: 2019-08-20

## 2019-08-20 RX ORDER — FLUCONAZOLE 150 MG/1
150 TABLET ORAL ONCE
Qty: 2 TABLET | Refills: 0 | Status: SHIPPED | OUTPATIENT
Start: 2019-08-20 | End: 2019-08-20

## 2019-08-20 NOTE — TELEPHONE ENCOUNTER
patient stated she has been antibiotics from her dentist patient stated now she has developed a yeast infection, requesting diflucan be sent to her pharmacy, please advise

## 2019-09-10 RX ORDER — CLOPIDOGREL BISULFATE 75 MG/1
TABLET ORAL
Qty: 90 TABLET | Refills: 1 | Status: SHIPPED
Start: 2019-09-10 | End: 2020-07-17 | Stop reason: SDUPTHER

## 2019-09-10 RX ORDER — BACLOFEN 10 MG/1
TABLET ORAL
Qty: 30 TABLET | Refills: 0 | Status: SHIPPED | OUTPATIENT
Start: 2019-09-10 | End: 2019-10-01 | Stop reason: SDUPTHER

## 2019-09-12 DIAGNOSIS — I70.222 ATHEROSCLEROSIS OF NATIVE ARTERIES OF EXTREMITIES WITH REST PAIN, LEFT LEG (HCC): Primary | ICD-10-CM

## 2019-09-13 ENCOUNTER — TELEPHONE (OUTPATIENT)
Dept: VASCULAR SURGERY | Age: 64
End: 2019-09-13

## 2019-09-19 ENCOUNTER — HOSPITAL ENCOUNTER (INPATIENT)
Age: 64
LOS: 5 days | Discharge: HOME | DRG: 193 | End: 2019-09-24
Attending: EMERGENCY MEDICINE | Admitting: INTERNAL MEDICINE
Payer: MEDICARE

## 2019-09-19 ENCOUNTER — APPOINTMENT (OUTPATIENT)
Dept: GENERAL RADIOLOGY | Age: 64
DRG: 193 | End: 2019-09-19
Payer: MEDICARE

## 2019-09-19 ENCOUNTER — TELEPHONE (OUTPATIENT)
Dept: FAMILY MEDICINE CLINIC | Age: 64
End: 2019-09-19

## 2019-09-19 DIAGNOSIS — J18.9 PNEUMONIA DUE TO ORGANISM: Primary | ICD-10-CM

## 2019-09-19 DIAGNOSIS — J44.1 COPD EXACERBATION (HCC): ICD-10-CM

## 2019-09-19 PROBLEM — J44.9 COPD (CHRONIC OBSTRUCTIVE PULMONARY DISEASE) (HCC): Status: ACTIVE | Noted: 2019-09-19

## 2019-09-19 LAB
ALBUMIN SERPL-MCNC: 3.5 G/DL (ref 3.5–5.2)
ALP BLD-CCNC: 74 U/L (ref 35–104)
ALT SERPL-CCNC: 19 U/L (ref 0–32)
ANION GAP SERPL CALCULATED.3IONS-SCNC: 9 MMOL/L (ref 7–16)
AST SERPL-CCNC: 19 U/L (ref 0–31)
B.E.: 1.8 MMOL/L (ref -3–3)
BACTERIA: ABNORMAL /HPF
BASOPHILS ABSOLUTE: 0.02 E9/L (ref 0–0.2)
BASOPHILS RELATIVE PERCENT: 0.2 % (ref 0–2)
BILIRUB SERPL-MCNC: 0.8 MG/DL (ref 0–1.2)
BILIRUBIN URINE: NEGATIVE
BLOOD, URINE: NEGATIVE
BUN BLDV-MCNC: 11 MG/DL (ref 8–23)
CALCIUM SERPL-MCNC: 9.1 MG/DL (ref 8.6–10.2)
CHLORIDE BLD-SCNC: 102 MMOL/L (ref 98–107)
CLARITY: ABNORMAL
CO2: 25 MMOL/L (ref 22–29)
COHB: 5.1 % (ref 0–1.5)
COLOR: YELLOW
CREAT SERPL-MCNC: 0.9 MG/DL (ref 0.5–1)
CRITICAL: ABNORMAL
DATE ANALYZED: ABNORMAL
DATE OF COLLECTION: ABNORMAL
EOSINOPHILS ABSOLUTE: 0.03 E9/L (ref 0.05–0.5)
EOSINOPHILS RELATIVE PERCENT: 0.2 % (ref 0–6)
EPITHELIAL CELLS, UA: ABNORMAL /HPF
GFR AFRICAN AMERICAN: >60
GFR NON-AFRICAN AMERICAN: >60 ML/MIN/1.73
GLUCOSE BLD-MCNC: 117 MG/DL (ref 74–99)
GLUCOSE URINE: NEGATIVE MG/DL
HCO3: 27 MMOL/L (ref 22–26)
HCT VFR BLD CALC: 42.7 % (ref 34–48)
HEMOGLOBIN: 13.1 G/DL (ref 11.5–15.5)
HHB: 6.9 % (ref 0–5)
IMMATURE GRANULOCYTES #: 0.06 E9/L
IMMATURE GRANULOCYTES %: 0.5 % (ref 0–5)
INFLUENZA A BY PCR: NOT DETECTED
INFLUENZA B BY PCR: NOT DETECTED
KETONES, URINE: NEGATIVE MG/DL
LAB: ABNORMAL
LACTIC ACID, SEPSIS: 1.1 MMOL/L (ref 0.5–1.9)
LEUKOCYTE ESTERASE, URINE: ABNORMAL
LYMPHOCYTES ABSOLUTE: 0.69 E9/L (ref 1.5–4)
LYMPHOCYTES RELATIVE PERCENT: 5.5 % (ref 20–42)
Lab: ABNORMAL
MCH RBC QN AUTO: 31.6 PG (ref 26–35)
MCHC RBC AUTO-ENTMCNC: 30.7 % (ref 32–34.5)
MCV RBC AUTO: 103.1 FL (ref 80–99.9)
METER GLUCOSE: 148 MG/DL (ref 74–99)
METER GLUCOSE: 252 MG/DL (ref 74–99)
METHB: 0.2 % (ref 0–1.5)
MODE: ABNORMAL
MONOCYTES ABSOLUTE: 0.37 E9/L (ref 0.1–0.95)
MONOCYTES RELATIVE PERCENT: 3 % (ref 2–12)
NEUTROPHILS ABSOLUTE: 11.35 E9/L (ref 1.8–7.3)
NEUTROPHILS RELATIVE PERCENT: 90.6 % (ref 43–80)
NITRITE, URINE: NEGATIVE
O2 CONTENT: 16.1 ML/DL
O2 SATURATION: 92.7 % (ref 92–98.5)
O2HB: 87.8 % (ref 94–97)
OPERATOR ID: ABNORMAL
PATIENT TEMP: 37 C
PCO2: 44.6 MMHG (ref 35–45)
PDW BLD-RTO: 13.8 FL (ref 11.5–15)
PH BLOOD GAS: 7.4 (ref 7.35–7.45)
PH UA: 7 (ref 5–9)
PLATELET # BLD: 237 E9/L (ref 130–450)
PMV BLD AUTO: 10.8 FL (ref 7–12)
PO2: 65.6 MMHG (ref 60–100)
POTASSIUM SERPL-SCNC: 4.8 MMOL/L (ref 3.5–5)
PRO-BNP: 1429 PG/ML (ref 0–125)
PROTEIN UA: 30 MG/DL
RBC # BLD: 4.14 E12/L (ref 3.5–5.5)
RBC UA: ABNORMAL /HPF (ref 0–2)
SODIUM BLD-SCNC: 136 MMOL/L (ref 132–146)
SOURCE, BLOOD GAS: ABNORMAL
SPECIFIC GRAVITY UA: 1.01 (ref 1–1.03)
THB: 13 G/DL (ref 11.5–16.5)
TIME ANALYZED: 1619
TOTAL PROTEIN: 6.9 G/DL (ref 6.4–8.3)
TROPONIN: <0.01 NG/ML (ref 0–0.03)
UROBILINOGEN, URINE: 2 E.U./DL
WBC # BLD: 12.5 E9/L (ref 4.5–11.5)
WBC UA: ABNORMAL /HPF (ref 0–5)
YEAST: ABNORMAL

## 2019-09-19 PROCEDURE — 6370000000 HC RX 637 (ALT 250 FOR IP): Performed by: STUDENT IN AN ORGANIZED HEALTH CARE EDUCATION/TRAINING PROGRAM

## 2019-09-19 PROCEDURE — 87070 CULTURE OTHR SPECIMN AEROBIC: CPT

## 2019-09-19 PROCEDURE — 6370000000 HC RX 637 (ALT 250 FOR IP): Performed by: PHYSICIAN ASSISTANT

## 2019-09-19 PROCEDURE — 71045 X-RAY EXAM CHEST 1 VIEW: CPT

## 2019-09-19 PROCEDURE — 94640 AIRWAY INHALATION TREATMENT: CPT

## 2019-09-19 PROCEDURE — 82962 GLUCOSE BLOOD TEST: CPT

## 2019-09-19 PROCEDURE — 36415 COLL VENOUS BLD VENIPUNCTURE: CPT

## 2019-09-19 PROCEDURE — 93005 ELECTROCARDIOGRAM TRACING: CPT | Performed by: STUDENT IN AN ORGANIZED HEALTH CARE EDUCATION/TRAINING PROGRAM

## 2019-09-19 PROCEDURE — 2060000000 HC ICU INTERMEDIATE R&B

## 2019-09-19 PROCEDURE — 81001 URINALYSIS AUTO W/SCOPE: CPT

## 2019-09-19 PROCEDURE — 80053 COMPREHEN METABOLIC PANEL: CPT

## 2019-09-19 PROCEDURE — 87088 URINE BACTERIA CULTURE: CPT

## 2019-09-19 PROCEDURE — 85025 COMPLETE CBC W/AUTO DIFF WBC: CPT

## 2019-09-19 PROCEDURE — 87633 RESP VIRUS 12-25 TARGETS: CPT

## 2019-09-19 PROCEDURE — 87486 CHLMYD PNEUM DNA AMP PROBE: CPT

## 2019-09-19 PROCEDURE — 82805 BLOOD GASES W/O2 SATURATION: CPT

## 2019-09-19 PROCEDURE — 83880 ASSAY OF NATRIURETIC PEPTIDE: CPT

## 2019-09-19 PROCEDURE — 6360000002 HC RX W HCPCS: Performed by: INTERNAL MEDICINE

## 2019-09-19 PROCEDURE — 99285 EMERGENCY DEPT VISIT HI MDM: CPT

## 2019-09-19 PROCEDURE — 99222 1ST HOSP IP/OBS MODERATE 55: CPT | Performed by: INTERNAL MEDICINE

## 2019-09-19 PROCEDURE — 87040 BLOOD CULTURE FOR BACTERIA: CPT

## 2019-09-19 PROCEDURE — 6360000002 HC RX W HCPCS: Performed by: STUDENT IN AN ORGANIZED HEALTH CARE EDUCATION/TRAINING PROGRAM

## 2019-09-19 PROCEDURE — 87450 HC DIRECT STREP B ANTIGEN: CPT

## 2019-09-19 PROCEDURE — 87206 SMEAR FLUORESCENT/ACID STAI: CPT

## 2019-09-19 PROCEDURE — 87581 M.PNEUMON DNA AMP PROBE: CPT

## 2019-09-19 PROCEDURE — 94660 CPAP INITIATION&MGMT: CPT

## 2019-09-19 PROCEDURE — 87502 INFLUENZA DNA AMP PROBE: CPT

## 2019-09-19 PROCEDURE — APPSS45 APP SPLIT SHARED TIME 31-45 MINUTES: Performed by: PHYSICIAN ASSISTANT

## 2019-09-19 PROCEDURE — 2580000003 HC RX 258: Performed by: INTERNAL MEDICINE

## 2019-09-19 PROCEDURE — 83605 ASSAY OF LACTIC ACID: CPT

## 2019-09-19 PROCEDURE — 84484 ASSAY OF TROPONIN QUANT: CPT

## 2019-09-19 PROCEDURE — 87798 DETECT AGENT NOS DNA AMP: CPT

## 2019-09-19 PROCEDURE — 2580000003 HC RX 258: Performed by: PHYSICIAN ASSISTANT

## 2019-09-19 PROCEDURE — 96374 THER/PROPH/DIAG INJ IV PUSH: CPT

## 2019-09-19 PROCEDURE — 94664 DEMO&/EVAL PT USE INHALER: CPT

## 2019-09-19 RX ORDER — BUMETANIDE 1 MG/1
1 TABLET ORAL DAILY
Status: DISCONTINUED | OUTPATIENT
Start: 2019-09-19 | End: 2019-09-24 | Stop reason: HOSPADM

## 2019-09-19 RX ORDER — FLUTICASONE PROPIONATE 50 MCG
1 SPRAY, SUSPENSION (ML) NASAL 2 TIMES DAILY
Status: DISCONTINUED | OUTPATIENT
Start: 2019-09-19 | End: 2019-09-24 | Stop reason: HOSPADM

## 2019-09-19 RX ORDER — DULOXETIN HYDROCHLORIDE 60 MG/1
60 CAPSULE, DELAYED RELEASE ORAL 2 TIMES DAILY
Status: DISCONTINUED | OUTPATIENT
Start: 2019-09-19 | End: 2019-09-24 | Stop reason: HOSPADM

## 2019-09-19 RX ORDER — IPRATROPIUM BROMIDE AND ALBUTEROL SULFATE 2.5; .5 MG/3ML; MG/3ML
1 SOLUTION RESPIRATORY (INHALATION)
Status: DISCONTINUED | OUTPATIENT
Start: 2019-09-19 | End: 2019-09-24 | Stop reason: HOSPADM

## 2019-09-19 RX ORDER — PANTOPRAZOLE SODIUM 40 MG/1
40 TABLET, DELAYED RELEASE ORAL 2 TIMES DAILY
Status: DISCONTINUED | OUTPATIENT
Start: 2019-09-19 | End: 2019-09-24 | Stop reason: HOSPADM

## 2019-09-19 RX ORDER — MONTELUKAST SODIUM 10 MG/1
10 TABLET ORAL NIGHTLY
Status: DISCONTINUED | OUTPATIENT
Start: 2019-09-19 | End: 2019-09-24 | Stop reason: HOSPADM

## 2019-09-19 RX ORDER — CLOPIDOGREL BISULFATE 75 MG/1
75 TABLET ORAL DAILY
Status: DISCONTINUED | OUTPATIENT
Start: 2019-09-19 | End: 2019-09-24 | Stop reason: HOSPADM

## 2019-09-19 RX ORDER — ASPIRIN 81 MG/1
324 TABLET, CHEWABLE ORAL ONCE
Status: COMPLETED | OUTPATIENT
Start: 2019-09-19 | End: 2019-09-19

## 2019-09-19 RX ORDER — DOXYCYCLINE HYCLATE 100 MG/1
100 CAPSULE ORAL EVERY 12 HOURS SCHEDULED
Status: DISCONTINUED | OUTPATIENT
Start: 2019-09-19 | End: 2019-09-24 | Stop reason: HOSPADM

## 2019-09-19 RX ORDER — METHYLPREDNISOLONE SODIUM SUCCINATE 40 MG/ML
40 INJECTION, POWDER, LYOPHILIZED, FOR SOLUTION INTRAMUSCULAR; INTRAVENOUS EVERY 8 HOURS
Status: DISCONTINUED | OUTPATIENT
Start: 2019-09-20 | End: 2019-09-21

## 2019-09-19 RX ORDER — ROSUVASTATIN CALCIUM 5 MG/1
5 TABLET, COATED ORAL DAILY
Status: DISCONTINUED | OUTPATIENT
Start: 2019-09-19 | End: 2019-09-24 | Stop reason: HOSPADM

## 2019-09-19 RX ORDER — ONDANSETRON 2 MG/ML
4 INJECTION INTRAMUSCULAR; INTRAVENOUS EVERY 6 HOURS PRN
Status: DISCONTINUED | OUTPATIENT
Start: 2019-09-19 | End: 2019-09-24 | Stop reason: HOSPADM

## 2019-09-19 RX ORDER — SODIUM CHLORIDE 0.9 % (FLUSH) 0.9 %
10 SYRINGE (ML) INJECTION PRN
Status: DISCONTINUED | OUTPATIENT
Start: 2019-09-19 | End: 2019-09-24 | Stop reason: HOSPADM

## 2019-09-19 RX ORDER — METHYLPREDNISOLONE SODIUM SUCCINATE 125 MG/2ML
125 INJECTION, POWDER, LYOPHILIZED, FOR SOLUTION INTRAMUSCULAR; INTRAVENOUS ONCE
Status: COMPLETED | OUTPATIENT
Start: 2019-09-19 | End: 2019-09-19

## 2019-09-19 RX ORDER — METOPROLOL SUCCINATE 25 MG/1
25 TABLET, EXTENDED RELEASE ORAL 2 TIMES DAILY
Status: DISCONTINUED | OUTPATIENT
Start: 2019-09-19 | End: 2019-09-24 | Stop reason: HOSPADM

## 2019-09-19 RX ORDER — FAMOTIDINE 20 MG/1
20 TABLET, FILM COATED ORAL 2 TIMES DAILY
Status: DISCONTINUED | OUTPATIENT
Start: 2019-09-19 | End: 2019-09-24 | Stop reason: HOSPADM

## 2019-09-19 RX ORDER — ISOSORBIDE MONONITRATE 60 MG/1
60 TABLET, EXTENDED RELEASE ORAL DAILY
Status: DISCONTINUED | OUTPATIENT
Start: 2019-09-19 | End: 2019-09-24 | Stop reason: HOSPADM

## 2019-09-19 RX ORDER — ASPIRIN 81 MG/1
81 TABLET, CHEWABLE ORAL DAILY
Status: DISCONTINUED | OUTPATIENT
Start: 2019-09-19 | End: 2019-09-24 | Stop reason: HOSPADM

## 2019-09-19 RX ORDER — CETIRIZINE HYDROCHLORIDE 10 MG/1
10 TABLET ORAL DAILY
Status: DISCONTINUED | OUTPATIENT
Start: 2019-09-19 | End: 2019-09-24 | Stop reason: HOSPADM

## 2019-09-19 RX ORDER — MELOXICAM 7.5 MG/1
7.5 TABLET ORAL DAILY
Status: DISCONTINUED | OUTPATIENT
Start: 2019-09-19 | End: 2019-09-24 | Stop reason: HOSPADM

## 2019-09-19 RX ORDER — SODIUM CHLORIDE 0.9 % (FLUSH) 0.9 %
10 SYRINGE (ML) INJECTION EVERY 12 HOURS SCHEDULED
Status: DISCONTINUED | OUTPATIENT
Start: 2019-09-19 | End: 2019-09-24 | Stop reason: HOSPADM

## 2019-09-19 RX ORDER — GABAPENTIN 300 MG/1
600 CAPSULE ORAL 3 TIMES DAILY
Status: DISCONTINUED | OUTPATIENT
Start: 2019-09-19 | End: 2019-09-24 | Stop reason: HOSPADM

## 2019-09-19 RX ORDER — IPRATROPIUM BROMIDE AND ALBUTEROL SULFATE 2.5; .5 MG/3ML; MG/3ML
3 SOLUTION RESPIRATORY (INHALATION) ONCE
Status: COMPLETED | OUTPATIENT
Start: 2019-09-19 | End: 2019-09-19

## 2019-09-19 RX ORDER — HYDRALAZINE HYDROCHLORIDE 50 MG/1
100 TABLET, FILM COATED ORAL 3 TIMES DAILY
Status: DISCONTINUED | OUTPATIENT
Start: 2019-09-19 | End: 2019-09-24 | Stop reason: HOSPADM

## 2019-09-19 RX ADMIN — DOXYCYCLINE 100 MG: 100 CAPSULE ORAL at 18:19

## 2019-09-19 RX ADMIN — FAMOTIDINE 20 MG: 20 TABLET ORAL at 21:12

## 2019-09-19 RX ADMIN — Medication 10 ML: at 21:22

## 2019-09-19 RX ADMIN — INSULIN LISPRO 3 UNITS: 100 INJECTION, SOLUTION INTRAVENOUS; SUBCUTANEOUS at 21:19

## 2019-09-19 RX ADMIN — FLUTICASONE PROPIONATE 1 SPRAY: 50 SPRAY, METERED NASAL at 21:15

## 2019-09-19 RX ADMIN — ASPIRIN 81 MG 324 MG: 81 TABLET ORAL at 15:02

## 2019-09-19 RX ADMIN — ASPIRIN 81 MG 81 MG: 81 TABLET ORAL at 18:19

## 2019-09-19 RX ADMIN — MELOXICAM 7.5 MG: 7.5 TABLET ORAL at 21:12

## 2019-09-19 RX ADMIN — IPRATROPIUM BROMIDE AND ALBUTEROL SULFATE 3 AMPULE: 2.5; .5 SOLUTION RESPIRATORY (INHALATION) at 13:59

## 2019-09-19 RX ADMIN — INSULIN LISPRO 2 UNITS: 100 INJECTION, SOLUTION INTRAVENOUS; SUBCUTANEOUS at 18:48

## 2019-09-19 RX ADMIN — DULOXETINE HYDROCHLORIDE 60 MG: 60 CAPSULE, DELAYED RELEASE ORAL at 21:13

## 2019-09-19 RX ADMIN — ISOSORBIDE MONONITRATE 60 MG: 60 TABLET, EXTENDED RELEASE ORAL at 18:19

## 2019-09-19 RX ADMIN — GABAPENTIN 600 MG: 300 CAPSULE ORAL at 21:13

## 2019-09-19 RX ADMIN — CETIRIZINE HYDROCHLORIDE 10 MG: 10 TABLET, FILM COATED ORAL at 18:20

## 2019-09-19 RX ADMIN — VITAMIN D, TAB 1000IU (100/BT) 1000 UNITS: 25 TAB at 18:19

## 2019-09-19 RX ADMIN — CEFTRIAXONE 1 G: 1 INJECTION, POWDER, FOR SOLUTION INTRAMUSCULAR; INTRAVENOUS at 21:23

## 2019-09-19 RX ADMIN — METHYLPREDNISOLONE SODIUM SUCCINATE 125 MG: 125 INJECTION, POWDER, FOR SOLUTION INTRAMUSCULAR; INTRAVENOUS at 15:18

## 2019-09-19 RX ADMIN — PANTOPRAZOLE SODIUM 40 MG: 40 TABLET, DELAYED RELEASE ORAL at 21:12

## 2019-09-19 RX ADMIN — ROSUVASTATIN CALCIUM 5 MG: 5 TABLET, FILM COATED ORAL at 18:19

## 2019-09-19 RX ADMIN — METOPROLOL SUCCINATE 25 MG: 25 TABLET, EXTENDED RELEASE ORAL at 21:12

## 2019-09-19 RX ADMIN — MONTELUKAST SODIUM 10 MG: 10 TABLET, FILM COATED ORAL at 21:12

## 2019-09-19 RX ADMIN — MOMETASONE FUROATE AND FORMOTEROL FUMARATE DIHYDRATE 2 PUFF: 200; 5 AEROSOL RESPIRATORY (INHALATION) at 20:39

## 2019-09-19 RX ADMIN — BUMETANIDE 1 MG: 1 TABLET ORAL at 18:19

## 2019-09-19 RX ADMIN — CLOPIDOGREL BISULFATE 75 MG: 75 TABLET, FILM COATED ORAL at 18:20

## 2019-09-19 RX ADMIN — HYDRALAZINE HYDROCHLORIDE 100 MG: 50 TABLET, FILM COATED ORAL at 21:12

## 2019-09-19 RX ADMIN — IPRATROPIUM BROMIDE AND ALBUTEROL SULFATE 1 AMPULE: 2.5; .5 SOLUTION RESPIRATORY (INHALATION) at 20:39

## 2019-09-19 RX ADMIN — SACUBITRIL AND VALSARTAN 1 TABLET: 24; 26 TABLET, FILM COATED ORAL at 21:13

## 2019-09-19 ASSESSMENT — ENCOUNTER SYMPTOMS
VOMITING: 0
SORE THROAT: 0
RHINORRHEA: 0
SINUS PRESSURE: 0
COUGH: 1
CHEST TIGHTNESS: 1
TROUBLE SWALLOWING: 0
BLOOD IN STOOL: 0
HEMOPTYSIS: 1
SHORTNESS OF BREATH: 1
NAUSEA: 0
ABDOMINAL PAIN: 0
WHEEZING: 1
DIARRHEA: 0
CONSTIPATION: 0
SPUTUM PRODUCTION: 1
ABDOMINAL DISTENTION: 0
BACK PAIN: 0

## 2019-09-19 ASSESSMENT — PAIN SCALES - GENERAL
PAINLEVEL_OUTOF10: 0
PAINLEVEL_OUTOF10: 4
PAINLEVEL_OUTOF10: 10
PAINLEVEL_OUTOF10: 0

## 2019-09-19 ASSESSMENT — PAIN DESCRIPTION - PAIN TYPE
TYPE: ACUTE PAIN
TYPE: ACUTE PAIN

## 2019-09-19 ASSESSMENT — PAIN DESCRIPTION - LOCATION
LOCATION: BACK
LOCATION: BACK

## 2019-09-19 NOTE — H&P
1/8/2016    [facility]    POLYSOMNOGRAPHY  1/2016    [facility]    UPPER GASTROINTESTINAL ENDOSCOPY  12/20/12    UPPER GASTROINTESTINAL ENDOSCOPY  12/23/2016    Dr Citlaly Pope   [facility] ENDOSCOPY  02/08/2017       Medications Prior to Admission:    Prior to Admission medications    Medication Sig Start Date End Date Taking?  Authorizing Provider   Insulin Degludec (TRESIBA FLEXTOUCH) 200 UNIT/ML SOPN Inject 50 units into skin nightly 9/10/19   Racheal Hayes,    baclofen (LIORESAL) 10 MG tablet TAKE 1 TABLET BY MOUTH TWICE A DAY AS NEEDED 9/10/19   Racheal Hayes, DO   clopidogrel (PLAVIX) 75 MG tablet TAKE 1 TABLET BY MOUTH EVERY DAY 9/10/19   Racheal Hayes, DO   meloxicam (MOBIC) 7.5 MG tablet Take 1 tablet by mouth daily 8/16/19   Racheal Hayes, DO   cetirizine (ZYRTEC) 10 MG tablet Take 1 tablet by mouth daily 7/26/19   Armando Dominguez, DO   montelukast (SINGULAIR) 10 MG tablet Take 1 tablet by mouth nightly 7/26/19   Armando Dominguez, DO   dextromethorphan-guaiFENesin (DIABETIC TUSSIN DM)  MG/5ML syrup Take 10 mLs by mouth every 4 hours as needed for Cough 7/25/19   Racheal Hayes, DO   rosuvastatin (CRESTOR) 5 MG tablet TAKE 1 TABLET BY MOUTH EVERY DAY 7/19/19   Racheal Hayes, DO   ranitidine (ZANTAC) 150 MG tablet Take 1 tablet by mouth 2 times daily 7/3/19   Racheal Hayes, DO   insulin lispro (HUMALOG KWIKPEN) 100 UNIT/ML pen INJECT 15 UNITS SUBCUTANEOUSLY THREE TIMES A DAY BEFORE MEALS 6/26/19   Racheal Hayes, DO   Alcohol Swabs ([facility] ALCOHOL PREP SWABS) 70 % PADS Use daily 6/26/19   Racheal Hayes, DO   Insulin Pen Needle (B-D UF III MINI PEN NEEDLES) 31G X 5 MM MISC USE EVERY DAY AS DIRECTED 6/26/19   Racheal Hayes,    aspirin 81 MG chewable tablet TAKE 1 TABLET BY MOUTH DAILY 6/26/19   Racheal Hayes DO   isosorbide mononitrate (IMDUR) 60 MG extended release tablet Take 1 tablet by mouth daily 6/26/19   Sana Cobb DO Cholecalciferol (VITAMIN D3) 1000 units TABS Take 1 tablet by mouth daily 6/26/19   Talon Haeys DO   [facility] ARTHRITIS PAIN RELIEF 650 MG extended release tablet Take 1 tablet by mouth 3 times daily as needed 3/8/19   Historical Provider, MD   hydrALAZINE (APRESOLINE) 100 MG tablet Take 1 tablet by mouth 3 times daily 2/11/19   Shimon Villalpando MD   Insulin Syringe-Needle U-100 31G X 5/16\" 1 ML MISC 1 each by Does not apply route daily Use as directed 11/14/18   Talon Hayes DO   gabapentin (NEURONTIN) 300 MG capsule Take 600 mg by mouth 3 times daily. Praful Bowman Historical Provider, MD   olopatadine (PATADAY) 0.2 % SOLN ophthalmic solution Place 1 drop into both eyes daily    Historical Provider, MD   diphenoxylate-atropine (LOMOTIL) 2.5-0.025 MG per tablet Take 1 tablet by mouth 4 times daily as needed for Diarrhea. .    Historical Provider, MD   Multiple Vitamins-Minerals (THERAPEUTIC MULTIVITAMIN-MINERALS) tablet Take 1 tablet by mouth daily    Historical Provider, MD   albuterol (PROVENTIL) (2.5 MG/3ML) 0.083% nebulizer solution Take 2.5 mg by nebulization every 6 hours as needed for Wheezing    Historical Provider, MD   BiPAP Machine MISC by Does not apply route nightly 27/23    Dayo Tate MD       Allergies:    Latex; Bee venom; Dilaudid [hydromorphone hcl]; Dye [iodides]; Percocet [oxycodone-acetaminophen]; Keflex [cephalexin]; Lasix [furosemide]; Levaquin [levofloxacin in d5w]; Lipitor; Lyrica [pregabalin]; Morphine; Naproxen; Nefazodone; Norvasc [amlodipine]; Oxycodone-acetaminophen; Shellfish-derived products; and Trazodone and nefazodone    Social History:    reports that she has been smoking cigarettes. She started smoking about 45 years ago. She has a 10.00 pack-year smoking history. She has never used smokeless tobacco. She reports that she has current or past drug history. Drug: Marijuana. She reports that she does not drink alcohol.     Family History:       Problem Relation Age of Onset

## 2019-09-19 NOTE — ED PROVIDER NOTES
Patient is a 59-year-old female who presented to ED for evaluation of shortness of breath, cough. Onset of symptoms approximately 1 to 2 days prior to arrival.  Patient notes associated fever with T-max of 102 °F last p.m. with noted streaks of blood in sputum. Patient with significant pmh of oxygen dependent COPD-4 L. Patient is followed by pulmonology, Dr. Lisset Camacho. EF 65% 6/2018. Followed by Cardiology, Dr. Mirta Paredes. Denies associated chest pain, nausea, vomiting, or diaphoresis. The history is provided by the patient and medical records. Shortness of Breath   Severity:  Moderate  Onset quality:  Gradual  Duration:  2 days  Timing:  Constant  Progression:  Worsening  Chronicity:  Recurrent  Relieved by:  Nothing  Worsened by: Activity and exertion  Ineffective treatments:  Inhaler  Associated symptoms: cough, fever, hemoptysis, sputum production and wheezing    Associated symptoms: no abdominal pain, no chest pain, no diaphoresis, no ear pain, no headaches, no neck pain, no rash, no sore throat and no vomiting    Cough:     Cough characteristics:  Productive    Sputum characteristics:  Yellow    Onset quality:  Gradual    Duration:  2 days    Timing:  Constant    Progression:  Unchanged    Chronicity:  Recurrent       Review of Systems   Constitutional: Positive for chills, fatigue and fever. Negative for diaphoresis. HENT: Negative for congestion, ear pain, rhinorrhea, sinus pressure, sneezing, sore throat and trouble swallowing. Respiratory: Positive for cough, hemoptysis, sputum production, chest tightness, shortness of breath and wheezing. Cardiovascular: Negative for chest pain and palpitations. Gastrointestinal: Negative for abdominal distention, abdominal pain, blood in stool, constipation, diarrhea, nausea and vomiting. Genitourinary: Negative for difficulty urinating, dysuria, flank pain, hematuria and urgency.    Musculoskeletal: Negative for arthralgias, back pain, myalgias and neck pain. Skin: Negative for rash and wound. Neurological: Negative for weakness, light-headedness, numbness and headaches. Psychiatric/Behavioral: Negative for agitation and confusion. The patient is not nervous/anxious and is not hyperactive. Physical Exam   Constitutional: She is oriented to person, place, and time. She appears well-developed and well-nourished. No distress. HENT:   Head: Normocephalic and atraumatic. Right Ear: External ear normal.   Left Ear: External ear normal.   Mouth/Throat: Oropharynx is clear and moist.   Eyes: Pupils are equal, round, and reactive to light. Conjunctivae and EOM are normal. Right eye exhibits no discharge. Left eye exhibits no discharge. Neck: Normal range of motion. Neck supple. No thyromegaly present. Cardiovascular: Regular rhythm and normal heart sounds. Tachycardia present. Pulmonary/Chest: Accessory muscle usage present. She is in respiratory distress. She has decreased breath sounds. She has wheezes. She has no rales. She exhibits no tenderness. Abdominal: Soft. She exhibits no distension and no mass. There is no tenderness. There is no rebound and no guarding. Musculoskeletal: Normal range of motion. She exhibits no tenderness. Neurological: She is alert and oriented to person, place, and time. Skin: Skin is warm and dry. No rash noted. She is not diaphoretic. Psychiatric: She has a normal mood and affect. Her behavior is normal. Judgment and thought content normal.        Procedures     MDM  Number of Diagnoses or Management Options  Diagnosis management comments: Patient is a 78-year-old female who presented to ED for evaluation of cough, shortness of breath. On arrival to ED, physical exam as noted above. Labs--leukocytosis 12.5, no lactic acidosis, troponin WNL. Patient prescribed empiric antibiotics for suspected pneumonic process. Patient was administered IV steroids breathing treatment with improved symptoms.   Chest (1/2016); liver biopsy (1/8/2016); bronchial brush biopsy (1/25/2013); Breast surgery (2000); Cholecystectomy (YRS AGO); Cardiac surgery (12/2011); Cardiac catheterization (04/20/2015); Hysterectomy; joint replacement (2011); Upper gastrointestinal endoscopy (12/23/2016); Colonoscopy (11/15/2013); Colonoscopy (12/23/2016); Upper gastrointestinal endoscopy (02/08/2017); Endoscopy, colon, diagnostic (04/18/2017); Gallbladder surgery; and angioplasty (11/13/2018). Social History:  reports that she has been smoking cigarettes. She started smoking about 45 years ago. She has a 10.00 pack-year smoking history. She has never used smokeless tobacco. She reports that she has current or past drug history. Drug: Marijuana. She reports that she does not drink alcohol. Family History: family history includes Asthma in her father; Cancer in her mother. The patients home medications have been reviewed. Allergies: Latex; Bee venom; Dilaudid [hydromorphone hcl]; Dye [iodides]; Percocet [oxycodone-acetaminophen]; Keflex [cephalexin]; Lasix [furosemide]; Levaquin [levofloxacin in d5w]; Lipitor; Lyrica [pregabalin]; Morphine; Naproxen; Nefazodone; Norvasc [amlodipine]; Oxycodone-acetaminophen;  Shellfish-derived products; and Trazodone and nefazodone    -------------------------------------------------- RESULTS -------------------------------------------------    LABS:  Results for orders placed or performed during the hospital encounter of 09/19/19   Rapid influenza A/B antigens   Result Value Ref Range    Influenza A by PCR Not Detected Not Detected    Influenza B by PCR Not Detected Not Detected   CBC auto differential   Result Value Ref Range    WBC 12.5 (H) 4.5 - 11.5 E9/L    RBC 4.14 3.50 - 5.50 E12/L    Hemoglobin 13.1 11.5 - 15.5 g/dL    Hematocrit 42.7 34.0 - 48.0 %    .1 (H) 80.0 - 99.9 fL    MCH 31.6 26.0 - 35.0 pg    MCHC 30.7 (L) 32.0 - 34.5 %    RDW 13.8 11.5 - 15.0 fL    Platelets 069 609 - 078 E9/L °C) Oral 101 24 90 % -- --   09/19/19 1629 (!) 153/56 99.6 °F (37.6 °C) Axillary 102 16 93 % -- --   09/19/19 1459 (!) 176/65 -- -- 104 22 95 % -- --   09/19/19 1256 135/60 99.2 °F (37.3 °C) Oral 98 22 90 % 5' 1\" (1.549 m) 240 lb (108.9 kg)       Oxygen Saturation Interpretation: Abnormal and Improved after treatment    ------------------------------------------ PROGRESS NOTES ------------------------------------------  Re-evaluation(s):  Time: 1:42 PM  Patients symptoms show no change  Repeat physical examination is not changed    Counseling:  I have spoken with the patient and discussed todays results, in addition to providing specific details for the plan of care and counseling regarding the diagnosis and prognosis. Their questions are answered at this time and they are agreeable with the plan of admission.    --------------------------------- ADDITIONAL PROVIDER NOTES ---------------------------------  Consultations:  Time: 1:43 PM. Spoke with Dr. Sarwat Montana.  Discussed case. They will admit the patient. This patient's ED course included: a personal history and physicial examination, re-evaluation prior to disposition, multiple bedside re-evaluations, IV medications, cardiac monitoring and continuous pulse oximetry    This patient has remained hemodynamically stable during their ED course. Diagnosis:  1. Pneumonia due to organism    2. COPD exacerbation ([facility]        Disposition:  Patient's disposition: Admit to telemetry  Patient's condition is stable.            Bev Vogel,   Resident  09/19/19 9339

## 2019-09-20 ENCOUNTER — APPOINTMENT (OUTPATIENT)
Dept: NUCLEAR MEDICINE | Age: 64
DRG: 193 | End: 2019-09-20
Payer: MEDICARE

## 2019-09-20 LAB
ALBUMIN SERPL-MCNC: 3.8 G/DL (ref 3.5–5.2)
ALP BLD-CCNC: 73 U/L (ref 35–104)
ALT SERPL-CCNC: 21 U/L (ref 0–32)
ANION GAP SERPL CALCULATED.3IONS-SCNC: 12 MMOL/L (ref 7–16)
AST SERPL-CCNC: 14 U/L (ref 0–31)
BASOPHILS ABSOLUTE: 0 E9/L (ref 0–0.2)
BASOPHILS RELATIVE PERCENT: 0 % (ref 0–2)
BILIRUB SERPL-MCNC: 0.4 MG/DL (ref 0–1.2)
BUN BLDV-MCNC: 25 MG/DL (ref 8–23)
CALCIUM SERPL-MCNC: 9.4 MG/DL (ref 8.6–10.2)
CHLORIDE BLD-SCNC: 95 MMOL/L (ref 98–107)
CO2: 28 MMOL/L (ref 22–29)
CREAT SERPL-MCNC: 1.3 MG/DL (ref 0.5–1)
EKG ATRIAL RATE: 110 BPM
EKG P AXIS: 56 DEGREES
EKG P-R INTERVAL: 150 MS
EKG Q-T INTERVAL: 380 MS
EKG QRS DURATION: 144 MS
EKG QTC CALCULATION (BAZETT): 514 MS
EKG R AXIS: 64 DEGREES
EKG T AXIS: 19 DEGREES
EKG VENTRICULAR RATE: 110 BPM
EOSINOPHILS ABSOLUTE: 0 E9/L (ref 0.05–0.5)
EOSINOPHILS RELATIVE PERCENT: 0 % (ref 0–6)
FILM ARRAY ADENOVIRUS: NORMAL
FILM ARRAY BORDETELLA PERTUSSIS: NORMAL
FILM ARRAY CHLAMYDOPHILIA PNEUMONIAE: NORMAL
FILM ARRAY CORONAVIRUS 229E: NORMAL
FILM ARRAY CORONAVIRUS HKU1: NORMAL
FILM ARRAY CORONAVIRUS NL63: NORMAL
FILM ARRAY CORONAVIRUS OC43: NORMAL
FILM ARRAY INFLUENZA A VIRUS 09H1: NORMAL
FILM ARRAY INFLUENZA A VIRUS H1: NORMAL
FILM ARRAY INFLUENZA A VIRUS H3: NORMAL
FILM ARRAY INFLUENZA A VIRUS: NORMAL
FILM ARRAY INFLUENZA B: NORMAL
FILM ARRAY METAPNEUMOVIRUS: NORMAL
FILM ARRAY MYCOPLASMA PNEUMONIAE: NORMAL
FILM ARRAY PARAINFLUENZA VIRUS 1: NORMAL
FILM ARRAY PARAINFLUENZA VIRUS 2: NORMAL
FILM ARRAY PARAINFLUENZA VIRUS 3: NORMAL
FILM ARRAY PARAINFLUENZA VIRUS 4: NORMAL
FILM ARRAY RESPIRATORY SYNCITIAL VIRUS: NORMAL
FILM ARRAY RHINOVIRUS/ENTEROVIRUS: NORMAL
GFR AFRICAN AMERICAN: 50
GFR NON-AFRICAN AMERICAN: 50 ML/MIN/1.73
GLUCOSE BLD-MCNC: 214 MG/DL (ref 74–99)
HCT VFR BLD CALC: 40.5 % (ref 34–48)
HEMOGLOBIN: 12.3 G/DL (ref 11.5–15.5)
LYMPHOCYTES ABSOLUTE: 0.44 E9/L (ref 1.5–4)
LYMPHOCYTES RELATIVE PERCENT: 3.5 % (ref 20–42)
MCH RBC QN AUTO: 31 PG (ref 26–35)
MCHC RBC AUTO-ENTMCNC: 30.4 % (ref 32–34.5)
MCV RBC AUTO: 102 FL (ref 80–99.9)
METER GLUCOSE: 188 MG/DL (ref 74–99)
METER GLUCOSE: 209 MG/DL (ref 74–99)
METER GLUCOSE: 269 MG/DL (ref 74–99)
METER GLUCOSE: 398 MG/DL (ref 74–99)
MONOCYTES ABSOLUTE: 0 E9/L (ref 0.1–0.95)
MONOCYTES RELATIVE PERCENT: 0 % (ref 2–12)
NEUTROPHILS ABSOLUTE: 10.67 E9/L (ref 1.8–7.3)
NEUTROPHILS RELATIVE PERCENT: 96.5 % (ref 43–80)
NUCLEATED RED BLOOD CELLS: 0 /100 WBC
PDW BLD-RTO: 13.5 FL (ref 11.5–15)
PLATELET # BLD: 279 E9/L (ref 130–450)
PMV BLD AUTO: 10.8 FL (ref 7–12)
POTASSIUM REFLEX MAGNESIUM: 5.4 MMOL/L (ref 3.5–5)
RBC # BLD: 3.97 E12/L (ref 3.5–5.5)
RBC # BLD: NORMAL 10*6/UL
SODIUM BLD-SCNC: 135 MMOL/L (ref 132–146)
TOTAL PROTEIN: 7.4 G/DL (ref 6.4–8.3)
WBC # BLD: 11 E9/L (ref 4.5–11.5)

## 2019-09-20 PROCEDURE — 93010 ELECTROCARDIOGRAM REPORT: CPT | Performed by: INTERNAL MEDICINE

## 2019-09-20 PROCEDURE — 85025 COMPLETE CBC W/AUTO DIFF WBC: CPT

## 2019-09-20 PROCEDURE — 99232 SBSQ HOSP IP/OBS MODERATE 35: CPT | Performed by: INTERNAL MEDICINE

## 2019-09-20 PROCEDURE — 2580000003 HC RX 258: Performed by: PHYSICIAN ASSISTANT

## 2019-09-20 PROCEDURE — A9540 TC99M MAA: HCPCS | Performed by: RADIOLOGY

## 2019-09-20 PROCEDURE — 94660 CPAP INITIATION&MGMT: CPT

## 2019-09-20 PROCEDURE — APPSS30 APP SPLIT SHARED TIME 16-30 MINUTES: Performed by: NURSE PRACTITIONER

## 2019-09-20 PROCEDURE — 36415 COLL VENOUS BLD VENIPUNCTURE: CPT

## 2019-09-20 PROCEDURE — 82962 GLUCOSE BLOOD TEST: CPT

## 2019-09-20 PROCEDURE — 6370000000 HC RX 637 (ALT 250 FOR IP): Performed by: PHYSICIAN ASSISTANT

## 2019-09-20 PROCEDURE — 6360000002 HC RX W HCPCS: Performed by: INTERNAL MEDICINE

## 2019-09-20 PROCEDURE — 2580000003 HC RX 258

## 2019-09-20 PROCEDURE — A9558 XE133 XENON 10MCI: HCPCS | Performed by: RADIOLOGY

## 2019-09-20 PROCEDURE — 94640 AIRWAY INHALATION TREATMENT: CPT

## 2019-09-20 PROCEDURE — 2060000000 HC ICU INTERMEDIATE R&B

## 2019-09-20 PROCEDURE — 2580000003 HC RX 258: Performed by: INTERNAL MEDICINE

## 2019-09-20 PROCEDURE — 2700000000 HC OXYGEN THERAPY PER DAY

## 2019-09-20 PROCEDURE — 80053 COMPREHEN METABOLIC PANEL: CPT

## 2019-09-20 PROCEDURE — 6360000002 HC RX W HCPCS: Performed by: PHYSICIAN ASSISTANT

## 2019-09-20 PROCEDURE — 97165 OT EVAL LOW COMPLEX 30 MIN: CPT

## 2019-09-20 PROCEDURE — 78582 LUNG VENTILAT&PERFUS IMAGING: CPT

## 2019-09-20 PROCEDURE — 3430000000 HC RX DIAGNOSTIC RADIOPHARMACEUTICAL: Performed by: RADIOLOGY

## 2019-09-20 PROCEDURE — 97530 THERAPEUTIC ACTIVITIES: CPT

## 2019-09-20 RX ORDER — XENON XE-133 10 MCI/1
10 GAS RESPIRATORY (INHALATION)
Status: COMPLETED | OUTPATIENT
Start: 2019-09-20 | End: 2019-09-20

## 2019-09-20 RX ADMIN — HYDRALAZINE HYDROCHLORIDE 100 MG: 50 TABLET, FILM COATED ORAL at 09:44

## 2019-09-20 RX ADMIN — Medication 10 ML: at 09:44

## 2019-09-20 RX ADMIN — CLOPIDOGREL BISULFATE 75 MG: 75 TABLET, FILM COATED ORAL at 09:46

## 2019-09-20 RX ADMIN — MOMETASONE FUROATE AND FORMOTEROL FUMARATE DIHYDRATE 2 PUFF: 200; 5 AEROSOL RESPIRATORY (INHALATION) at 20:01

## 2019-09-20 RX ADMIN — INSULIN LISPRO 5 UNITS: 100 INJECTION, SOLUTION INTRAVENOUS; SUBCUTANEOUS at 22:31

## 2019-09-20 RX ADMIN — FAMOTIDINE 20 MG: 20 TABLET ORAL at 22:10

## 2019-09-20 RX ADMIN — INSULIN LISPRO 6 UNITS: 100 INJECTION, SOLUTION INTRAVENOUS; SUBCUTANEOUS at 07:43

## 2019-09-20 RX ADMIN — FLUTICASONE PROPIONATE 1 SPRAY: 50 SPRAY, METERED NASAL at 22:08

## 2019-09-20 RX ADMIN — GABAPENTIN 600 MG: 300 CAPSULE ORAL at 15:21

## 2019-09-20 RX ADMIN — XENON XE-133 10 MILLICURIE: 10 GAS RESPIRATORY (INHALATION) at 08:13

## 2019-09-20 RX ADMIN — IPRATROPIUM BROMIDE AND ALBUTEROL SULFATE 1 AMPULE: 2.5; .5 SOLUTION RESPIRATORY (INHALATION) at 11:12

## 2019-09-20 RX ADMIN — ASPIRIN 81 MG 81 MG: 81 TABLET ORAL at 09:46

## 2019-09-20 RX ADMIN — INSULIN LISPRO 2 UNITS: 100 INJECTION, SOLUTION INTRAVENOUS; SUBCUTANEOUS at 13:55

## 2019-09-20 RX ADMIN — HYDRALAZINE HYDROCHLORIDE 100 MG: 50 TABLET, FILM COATED ORAL at 15:20

## 2019-09-20 RX ADMIN — Medication 6 MILLICURIE: at 08:13

## 2019-09-20 RX ADMIN — IPRATROPIUM BROMIDE AND ALBUTEROL SULFATE 1 AMPULE: 2.5; .5 SOLUTION RESPIRATORY (INHALATION) at 16:36

## 2019-09-20 RX ADMIN — IPRATROPIUM BROMIDE AND ALBUTEROL SULFATE 1 AMPULE: 2.5; .5 SOLUTION RESPIRATORY (INHALATION) at 20:01

## 2019-09-20 RX ADMIN — PANTOPRAZOLE SODIUM 40 MG: 40 TABLET, DELAYED RELEASE ORAL at 09:46

## 2019-09-20 RX ADMIN — CETIRIZINE HYDROCHLORIDE 10 MG: 10 TABLET, FILM COATED ORAL at 09:46

## 2019-09-20 RX ADMIN — ENOXAPARIN SODIUM 40 MG: 40 INJECTION SUBCUTANEOUS at 09:47

## 2019-09-20 RX ADMIN — INSULIN LISPRO 4 UNITS: 100 INJECTION, SOLUTION INTRAVENOUS; SUBCUTANEOUS at 16:46

## 2019-09-20 RX ADMIN — METHYLPREDNISOLONE SODIUM SUCCINATE 40 MG: 40 INJECTION, POWDER, LYOPHILIZED, FOR SOLUTION INTRAMUSCULAR; INTRAVENOUS at 09:44

## 2019-09-20 RX ADMIN — SACUBITRIL AND VALSARTAN 1 TABLET: 24; 26 TABLET, FILM COATED ORAL at 22:09

## 2019-09-20 RX ADMIN — DOXYCYCLINE 100 MG: 100 CAPSULE ORAL at 22:09

## 2019-09-20 RX ADMIN — DULOXETINE HYDROCHLORIDE 60 MG: 60 CAPSULE, DELAYED RELEASE ORAL at 22:09

## 2019-09-20 RX ADMIN — METHYLPREDNISOLONE SODIUM SUCCINATE 40 MG: 40 INJECTION, POWDER, LYOPHILIZED, FOR SOLUTION INTRAMUSCULAR; INTRAVENOUS at 16:28

## 2019-09-20 RX ADMIN — METHYLPREDNISOLONE SODIUM SUCCINATE 40 MG: 40 INJECTION, POWDER, LYOPHILIZED, FOR SOLUTION INTRAMUSCULAR; INTRAVENOUS at 23:39

## 2019-09-20 RX ADMIN — METOPROLOL SUCCINATE 25 MG: 25 TABLET, EXTENDED RELEASE ORAL at 22:08

## 2019-09-20 RX ADMIN — METHYLPREDNISOLONE SODIUM SUCCINATE 40 MG: 40 INJECTION, POWDER, LYOPHILIZED, FOR SOLUTION INTRAMUSCULAR; INTRAVENOUS at 00:51

## 2019-09-20 RX ADMIN — MONTELUKAST SODIUM 10 MG: 10 TABLET, FILM COATED ORAL at 22:09

## 2019-09-20 RX ADMIN — GABAPENTIN 600 MG: 300 CAPSULE ORAL at 22:08

## 2019-09-20 RX ADMIN — CEFTRIAXONE 1 G: 1 INJECTION, POWDER, FOR SOLUTION INTRAMUSCULAR; INTRAVENOUS at 22:08

## 2019-09-20 RX ADMIN — GABAPENTIN 600 MG: 300 CAPSULE ORAL at 09:45

## 2019-09-20 RX ADMIN — MELOXICAM 7.5 MG: 7.5 TABLET ORAL at 09:46

## 2019-09-20 RX ADMIN — FAMOTIDINE 20 MG: 20 TABLET ORAL at 09:46

## 2019-09-20 RX ADMIN — FLUTICASONE PROPIONATE 1 SPRAY: 50 SPRAY, METERED NASAL at 09:47

## 2019-09-20 RX ADMIN — ROSUVASTATIN CALCIUM 5 MG: 5 TABLET, FILM COATED ORAL at 09:44

## 2019-09-20 RX ADMIN — METOPROLOL SUCCINATE 25 MG: 25 TABLET, EXTENDED RELEASE ORAL at 09:46

## 2019-09-20 RX ADMIN — BUMETANIDE 1 MG: 1 TABLET ORAL at 09:45

## 2019-09-20 RX ADMIN — HYDRALAZINE HYDROCHLORIDE 100 MG: 50 TABLET, FILM COATED ORAL at 22:09

## 2019-09-20 RX ADMIN — ISOSORBIDE MONONITRATE 60 MG: 60 TABLET, EXTENDED RELEASE ORAL at 10:02

## 2019-09-20 RX ADMIN — DOXYCYCLINE 100 MG: 100 CAPSULE ORAL at 09:45

## 2019-09-20 RX ADMIN — VITAMIN D, TAB 1000IU (100/BT) 1000 UNITS: 25 TAB at 09:45

## 2019-09-20 RX ADMIN — Medication 10 ML: at 22:36

## 2019-09-20 RX ADMIN — PANTOPRAZOLE SODIUM 40 MG: 40 TABLET, DELAYED RELEASE ORAL at 22:09

## 2019-09-20 RX ADMIN — WATER 10 ML: 1 INJECTION INTRAMUSCULAR; INTRAVENOUS; SUBCUTANEOUS at 16:29

## 2019-09-20 RX ADMIN — Medication 10 ML: at 00:51

## 2019-09-20 RX ADMIN — SACUBITRIL AND VALSARTAN 1 TABLET: 24; 26 TABLET, FILM COATED ORAL at 09:45

## 2019-09-20 RX ADMIN — DULOXETINE HYDROCHLORIDE 60 MG: 60 CAPSULE, DELAYED RELEASE ORAL at 09:45

## 2019-09-20 ASSESSMENT — PAIN - FUNCTIONAL ASSESSMENT: PAIN_FUNCTIONAL_ASSESSMENT: PREVENTS OR INTERFERES SOME ACTIVE ACTIVITIES AND ADLS

## 2019-09-20 ASSESSMENT — PAIN DESCRIPTION - PROGRESSION: CLINICAL_PROGRESSION: NOT CHANGED

## 2019-09-20 ASSESSMENT — PAIN SCALES - GENERAL
PAINLEVEL_OUTOF10: 0
PAINLEVEL_OUTOF10: 10
PAINLEVEL_OUTOF10: 0
PAINLEVEL_OUTOF10: 0

## 2019-09-20 ASSESSMENT — PAIN DESCRIPTION - DESCRIPTORS
DESCRIPTORS: ACHING
DESCRIPTORS: ACHING

## 2019-09-20 ASSESSMENT — PAIN DESCRIPTION - ONSET
ONSET: ON-GOING
ONSET: ON-GOING

## 2019-09-20 ASSESSMENT — PAIN DESCRIPTION - FREQUENCY: FREQUENCY: INTERMITTENT

## 2019-09-20 ASSESSMENT — PAIN DESCRIPTION - PAIN TYPE: TYPE: CHRONIC PAIN

## 2019-09-20 ASSESSMENT — PAIN DESCRIPTION - LOCATION: LOCATION: GENERALIZED

## 2019-09-20 NOTE — PROGRESS NOTES
Pulse 75   Temp 97.9 °F (36.6 °C) (Oral)   Resp 18   Ht 5' 1\" (1.549 m)   Wt 241 lb 3.2 oz (109.4 kg)   LMP 01/01/1990   SpO2 98%   BMI 45.57 kg/m²   General Appearance: alert and oriented to person, place and time, well-developed and well-nourished, in no acute distress and obese  Skin: warm and dry, no rash or erythema  Head: normocephalic and atraumatic  Eyes: pupils equal, round, and reactive to light and conjunctivae normal  ENT: hearing grossly normal bilaterally  Neck: neck supple and non tender without mass   Pulmonary/Chest: decreased breath sounds noted with a few wheezes but very little aeration noted  Cardiovascular: normal rate, regular rhythm and intact distal pulses  Abdomen: soft, non-tender, non-distended and bowel sounds normal  Extremities: no cyanosis, no clubbing and trace + edema-  bilateral LE  Musculoskeletal: no swollen joints, no joint deformity and no tender joints  Neurologic: no cranial nerve deficit and speech normal      Recent Labs     09/19/19  1318 09/20/19  1410    135   K 4.8 5.4*    95*   CO2 25 28   BUN 11 25*   CREATININE 0.9 1.3*   GLUCOSE 117* 214*   CALCIUM 9.1 9.4       Recent Labs     09/19/19  1318 09/20/19  1410   WBC 12.5* 11.0   RBC 4.14 3.97   HGB 13.1 12.3   HCT 42.7 40.5   .1* 102.0*   MCH 31.6 31.0   MCHC 30.7* 30.4*   RDW 13.8 13.5    279   MPV 10.8 10.8            Radiology:   NM LUNG VENT/PERFUSION (VQ)   Final Result   There is a low probability of pulmonary thromboembolic   disease. XR CHEST PORTABLE   Final Result      Cardiomegaly with with median sternotomy      There is ill-defined airspace consolidation seen throughout both lung   more pronounced on the right lower suspicion for pneumonic process.              Assessment:    Principal Problem:    COPD (chronic obstructive pulmonary disease) (HCC)  Active Problems:    Chronic combined systolic and diastolic heart failure (HCC)    DAYAN and COPD overlap syndrome ([address]

## 2019-09-20 NOTE — PROGRESS NOTES
Date: 9/19/2019    Time: 10:03 PM    Patient Placed On BIPAP/CPAP/ Non-Invasive Ventilation? Yes    If no must comment. Facial area red/color change? No           If YES are Blister/Lesion present? No   If yes must notify nursing staff  BIPAP/CPAP skin barrier? Yes    Skin barrier type:Liquicell       Comments: Pt placed on BiPAP for the night, liquicell in place.         Nayfelipemargarita Courtney      09/19/19 2202   NIV Type   Mode Bilevel   Mask Type Full face mask   Mask Size Small   Settings/Measurements   IPAP 23 cmH20   CPAP/EPAP 17 cmH2O   Rate Ordered 12   Resp 24   FiO2  50 %   Vt Exhaled 602 mL   Minute Volume 14.3 Liters   Mask Leak (lpm) 41 lpm   Comfort Level Good   Using Accessory Muscles No   SpO2 97

## 2019-09-20 NOTE — PROGRESS NOTES
understanding. Upon arrival, patient seated in armchair. At end of session, patient seated in armchair with call light and phone within reach, all lines and tubes intact. Pt would benefit from continued skilled OT to increase safety and independence with completion of ADL/IADL tasks for functional independence and quality of life.     Low Evaluation + 10 timed treatment minutes  Tx Time in: 1330  Tx Time out: 1340    Evaluation time includes thorough review of current medical information, gathering information on past medical history/social history and prior level of function, completion of standardized testing/informal observation of tasks, assessment of data, and development of POC/Goals    Brad Leslie OTR/L  [id number]

## 2019-09-20 NOTE — PROGRESS NOTES
Still with dyspnea and hemoptysis.    Vent settings:Vent Information  FiO2 : 50 %  Additional Respiratory  Assessments  Pulse: 75  Resp: 18  SpO2: 98 %      Current Facility-Administered Medications:     aspirin chewable tablet 81 mg, 81 mg, Oral, Daily, Dulce Stafford PA-C, 81 mg at 09/20/19 0946    bumetanide (BUMEX) tablet 1 mg, 1 mg, Oral, Daily, Dulce Stafford PA-C, 1 mg at 09/20/19 0945    cetirizine (ZYRTEC) tablet 10 mg, 10 mg, Oral, Daily, Dulce Stafford PA-C, 10 mg at 09/20/19 0946    vitamin D (CHOLECALCIFEROL) tablet 1,000 Units, 1,000 Units, Oral, Daily, Dulce Stafford PA-C, 1,000 Units at 09/20/19 0945    clopidogrel (PLAVIX) tablet 75 mg, 75 mg, Oral, Daily, Dulce Stafford PA-C, 75 mg at 09/20/19 0946    DULoxetine (CYMBALTA) extended release capsule 60 mg, 60 mg, Oral, BID, DAVID GeorgeC, 60 mg at 09/20/19 0945    gabapentin (NEURONTIN) capsule 600 mg, 600 mg, Oral, TID, Dulce Stafford PA-C, 600 mg at 09/20/19 0945    isosorbide mononitrate (IMDUR) extended release tablet 60 mg, 60 mg, Oral, Daily, DAVID GeorgeC, 60 mg at 09/20/19 1002    meloxicam (MOBIC) tablet 7.5 mg, 7.5 mg, Oral, Daily, TONY George-DANIEL, 7.5 mg at 09/20/19 0946    metoprolol succinate (TOPROL XL) extended release tablet 25 mg, 25 mg, Oral, BID, Dulce Stafford PA-C, 25 mg at 09/20/19 0946    mirabegron (MYRBETRIQ) extended release tablet 50 mg, 50 mg, Oral, Daily, DAVID GeorgeC    mometasone-formoterol (DULERA) 200-5 MCG/ACT inhaler 2 puff, 2 puff, Inhalation, Q12H, Dulce Stafford PA-C, 2 puff at 09/19/19 2039    montelukast (SINGULAIR) tablet 10 mg, 10 mg, Oral, Nightly, Dulce Stafford PA-C, 10 mg at 09/19/19 2112    pantoprazole (PROTONIX) tablet 40 mg, 40 mg, Oral, BID, Dulce Stafford PA-C, 40 mg at 09/20/19 0946    rosuvastatin (CRESTOR) tablet 5 mg, 5 mg, Oral, Daily, Dulce Stafford PA-C, 5 mg at 09/20/19 0944    tiotropium (SPIRIVA) inhalation capsule 18 mcg, 18 mcg, Inhalation, Daily, Dulce Stafford PA-C, Stopped at 09/19/19 1808    sacubitril-valsartan (ENTRESTO) 24-26 MG per tablet 1 tablet, 1 tablet, Oral, BID, Dulce Stafford PA-C, 1 tablet at 09/20/19 0945    famotidine (PEPCID) tablet 20 mg, 20 mg, Oral, BID, Dulce Stafford PA-C, 20 mg at 09/20/19 0946    fluticasone (FLONASE) 50 MCG/ACT nasal spray 1 spray, 1 spray, Nasal, BID, Dulce Stafford PA-C, 1 spray at 09/20/19 0947    methylPREDNISolone sodium (SOLU-MEDROL) injection 40 mg, 40 mg, Intravenous, Q8H, Dulce Stafford PA-C, 40 mg at 09/20/19 0944    ipratropium-albuterol (DUONEB) nebulizer solution 1 ampule, 1 ampule, Inhalation, Q4H WA, Dulce Stafford PA-C, 1 ampule at 09/20/19 1112    insulin lispro (HUMALOG) injection vial 0-12 Units, 0-12 Units, Subcutaneous, TID WC, Dulce Stafford PA-C, 6 Units at 09/20/19 0743    insulin lispro (HUMALOG) injection vial 0-6 Units, 0-6 Units, Subcutaneous, Nightly, Dulce Stafford PA-C, 3 Units at 09/19/19 2119    hydrALAZINE (APRESOLINE) tablet 100 mg, 100 mg, Oral, TID, Dulce Stafford PA-C, 100 mg at 09/20/19 0944    sodium chloride flush 0.9 % injection 10 mL, 10 mL, Intravenous, 2 times per day, Dulce Stafford PA-C, 10 mL at 09/20/19 0944    sodium chloride flush 0.9 % injection 10 mL, 10 mL, Intravenous, PRN, Dulce Stafford PA-C, 10 mL at 09/20/19 0051    magnesium hydroxide (MILK OF MAGNESIA) 400 MG/5ML suspension 30 mL, 30 mL, Oral, Daily PRN, Dulce Stafford PA-C    ondansetron (ZOFRAN) injection 4 mg, 4 mg, Intravenous, Q6H PRN, Dulce Stafford PA-C    enoxaparin (LOVENOX) injection 40 mg, 40 mg, Subcutaneous, Daily, Dulce Stafford PA-C, 40 mg at 09/20/19 0947    doxycycline hyclate (VIBRAMYCIN) capsule 100 mg, 100 mg, Oral, 2 times per day, Dulce Stafford PA-C, 100 mg at 09/20/19 0945    cefTRIAXone (ROCEPHIN) 1 g in dextrose 5 % 50 mL IVPB (vial-mate), 1 g, Intravenous, Q24H, Samantha Zeng MD, Stopped at 09/19/19 2153  /69   Pulse 75 Temp 97.9 °F (36.6 °C) (Oral)   Resp 18   Ht 5' 1\" (1.549 m)   Wt 241 lb 3.2 oz (109.4 kg)   LMP 01/01/1990   SpO2 98%   BMI 45.57 kg/m²     General: No distress  Chest: Symmetric, no accessory use  Heart: RRR  Lungs: Bibasilar rales  Abdomen: Soft, nt  Extremities: No edema    Intake/Output Summary (Last 24 hours) at 9/20/2019 1235  Last data filed at 9/20/2019 1043  Gross per 24 hour   Intake 240 ml   Output 850 ml   Net -610 ml     Glucose 188  V/Q scan low prob  IMPRESSION:   Patient Active Problem List   Diagnosis    Morbid obesity due to excess calories ([address]    Hyperlipidemia    DAYAN and COPD overlap syndrome ([address]    Vitamin D insufficiency    Chronic combined systolic and diastolic heart failure ([address]    Essential hypertension    GERD (gastroesophageal reflux disease)    Major depressive disorder, recurrent episode, mild ([address]    Diabetic polyneuropathy associated with type 2 diabetes mellitus (HCC)    Chronic passive hepatic congestion    Mixed incontinence urge and stress    Chronic back pain - d/t muscle spasm    Glaucoma, open angle    Elevated CA 19-9 level    Lumbar stenosis    Spondylosis 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    Lumbar disc herniation    Asymmetric septal hypertrophy (HCC)    Non-compliance    Hiatal hernia    Melanosis coli    Coronary artery disease involving native coronary artery of native heart without angina pectoris    DM2 (diabetes mellitus, type 2) ([address]    Cigarette smoker    Marijuana use, smoked    Ischemic cardiomyopathy    PVD (peripheral vascular disease) ([address]    Chronic venous insufficiency    History of non-ST elevation myocardial infarction (NSTEMI)    QT prolongation    Atherosclerosis of native arteries of extremities with rest pain, left leg (HCC)    Cerebellar infarct (HCC)    COPD exacerbation (HCC)    Chronic respiratory failure with hypoxia and hypercapnia (HCC)    Acute respiratory failure with hypoxia

## 2019-09-21 LAB
CULTURE, RESPIRATORY: NORMAL
METER GLUCOSE: 256 MG/DL (ref 74–99)
METER GLUCOSE: 259 MG/DL (ref 74–99)
METER GLUCOSE: 294 MG/DL (ref 74–99)
METER GLUCOSE: 300 MG/DL (ref 74–99)
SMEAR, RESPIRATORY: NORMAL
STREP PNEUMONIAE ANTIGEN, URINE: NORMAL
URINE CULTURE, ROUTINE: NORMAL

## 2019-09-21 PROCEDURE — 97530 THERAPEUTIC ACTIVITIES: CPT

## 2019-09-21 PROCEDURE — 6370000000 HC RX 637 (ALT 250 FOR IP): Performed by: NURSE PRACTITIONER

## 2019-09-21 PROCEDURE — 6360000002 HC RX W HCPCS: Performed by: INTERNAL MEDICINE

## 2019-09-21 PROCEDURE — 6370000000 HC RX 637 (ALT 250 FOR IP): Performed by: PHYSICIAN ASSISTANT

## 2019-09-21 PROCEDURE — 82962 GLUCOSE BLOOD TEST: CPT

## 2019-09-21 PROCEDURE — 2060000000 HC ICU INTERMEDIATE R&B

## 2019-09-21 PROCEDURE — 2580000003 HC RX 258: Performed by: INTERNAL MEDICINE

## 2019-09-21 PROCEDURE — 2700000000 HC OXYGEN THERAPY PER DAY

## 2019-09-21 PROCEDURE — 2580000003 HC RX 258: Performed by: PHYSICIAN ASSISTANT

## 2019-09-21 PROCEDURE — 99232 SBSQ HOSP IP/OBS MODERATE 35: CPT | Performed by: INTERNAL MEDICINE

## 2019-09-21 PROCEDURE — 94660 CPAP INITIATION&MGMT: CPT

## 2019-09-21 PROCEDURE — APPSS30 APP SPLIT SHARED TIME 16-30 MINUTES: Performed by: NURSE PRACTITIONER

## 2019-09-21 PROCEDURE — 94640 AIRWAY INHALATION TREATMENT: CPT

## 2019-09-21 PROCEDURE — 6360000002 HC RX W HCPCS: Performed by: PHYSICIAN ASSISTANT

## 2019-09-21 PROCEDURE — 97161 PT EVAL LOW COMPLEX 20 MIN: CPT

## 2019-09-21 RX ORDER — HYDROCODONE BITARTRATE AND ACETAMINOPHEN 7.5; 325 MG/1; MG/1
1 TABLET ORAL EVERY 6 HOURS PRN
Status: DISCONTINUED | OUTPATIENT
Start: 2019-09-21 | End: 2019-09-24 | Stop reason: HOSPADM

## 2019-09-21 RX ORDER — METHYLPREDNISOLONE SODIUM SUCCINATE 40 MG/ML
40 INJECTION, POWDER, LYOPHILIZED, FOR SOLUTION INTRAMUSCULAR; INTRAVENOUS EVERY 12 HOURS
Status: DISCONTINUED | OUTPATIENT
Start: 2019-09-21 | End: 2019-09-23

## 2019-09-21 RX ORDER — BACLOFEN 10 MG/1
10 TABLET ORAL 2 TIMES DAILY PRN
Status: DISCONTINUED | OUTPATIENT
Start: 2019-09-21 | End: 2019-09-24 | Stop reason: HOSPADM

## 2019-09-21 RX ADMIN — ROSUVASTATIN CALCIUM 5 MG: 5 TABLET, FILM COATED ORAL at 11:03

## 2019-09-21 RX ADMIN — Medication 10 ML: at 20:34

## 2019-09-21 RX ADMIN — MELOXICAM 7.5 MG: 7.5 TABLET ORAL at 11:01

## 2019-09-21 RX ADMIN — HYDROCODONE BITARTRATE AND ACETAMINOPHEN 1 TABLET: 7.5; 325 TABLET ORAL at 20:36

## 2019-09-21 RX ADMIN — CLOPIDOGREL BISULFATE 75 MG: 75 TABLET, FILM COATED ORAL at 11:03

## 2019-09-21 RX ADMIN — IPRATROPIUM BROMIDE AND ALBUTEROL SULFATE 1 AMPULE: 2.5; .5 SOLUTION RESPIRATORY (INHALATION) at 16:46

## 2019-09-21 RX ADMIN — HYDRALAZINE HYDROCHLORIDE 100 MG: 50 TABLET, FILM COATED ORAL at 20:34

## 2019-09-21 RX ADMIN — INSULIN LISPRO 6 UNITS: 100 INJECTION, SOLUTION INTRAVENOUS; SUBCUTANEOUS at 06:26

## 2019-09-21 RX ADMIN — INSULIN LISPRO 6 UNITS: 100 INJECTION, SOLUTION INTRAVENOUS; SUBCUTANEOUS at 16:25

## 2019-09-21 RX ADMIN — ASPIRIN 81 MG 81 MG: 81 TABLET ORAL at 11:01

## 2019-09-21 RX ADMIN — Medication 10 ML: at 11:09

## 2019-09-21 RX ADMIN — GABAPENTIN 600 MG: 300 CAPSULE ORAL at 20:35

## 2019-09-21 RX ADMIN — DULOXETINE HYDROCHLORIDE 60 MG: 60 CAPSULE, DELAYED RELEASE ORAL at 11:01

## 2019-09-21 RX ADMIN — BUMETANIDE 1 MG: 1 TABLET ORAL at 11:00

## 2019-09-21 RX ADMIN — DULOXETINE HYDROCHLORIDE 60 MG: 60 CAPSULE, DELAYED RELEASE ORAL at 20:35

## 2019-09-21 RX ADMIN — HYDRALAZINE HYDROCHLORIDE 100 MG: 50 TABLET, FILM COATED ORAL at 11:00

## 2019-09-21 RX ADMIN — SACUBITRIL AND VALSARTAN 1 TABLET: 24; 26 TABLET, FILM COATED ORAL at 20:35

## 2019-09-21 RX ADMIN — GABAPENTIN 600 MG: 300 CAPSULE ORAL at 14:36

## 2019-09-21 RX ADMIN — METOPROLOL SUCCINATE 25 MG: 25 TABLET, EXTENDED RELEASE ORAL at 20:35

## 2019-09-21 RX ADMIN — FLUTICASONE PROPIONATE 1 SPRAY: 50 SPRAY, METERED NASAL at 11:00

## 2019-09-21 RX ADMIN — HYDRALAZINE HYDROCHLORIDE 100 MG: 50 TABLET, FILM COATED ORAL at 14:36

## 2019-09-21 RX ADMIN — INSULIN LISPRO 3 UNITS: 100 INJECTION, SOLUTION INTRAVENOUS; SUBCUTANEOUS at 20:34

## 2019-09-21 RX ADMIN — IPRATROPIUM BROMIDE AND ALBUTEROL SULFATE 1 AMPULE: 2.5; .5 SOLUTION RESPIRATORY (INHALATION) at 12:21

## 2019-09-21 RX ADMIN — PANTOPRAZOLE SODIUM 40 MG: 40 TABLET, DELAYED RELEASE ORAL at 20:35

## 2019-09-21 RX ADMIN — DOXYCYCLINE 100 MG: 100 CAPSULE ORAL at 11:03

## 2019-09-21 RX ADMIN — IPRATROPIUM BROMIDE AND ALBUTEROL SULFATE 1 AMPULE: 2.5; .5 SOLUTION RESPIRATORY (INHALATION) at 21:16

## 2019-09-21 RX ADMIN — VITAMIN D, TAB 1000IU (100/BT) 1000 UNITS: 25 TAB at 11:00

## 2019-09-21 RX ADMIN — Medication: at 23:29

## 2019-09-21 RX ADMIN — FAMOTIDINE 20 MG: 20 TABLET ORAL at 20:34

## 2019-09-21 RX ADMIN — MONTELUKAST SODIUM 10 MG: 10 TABLET, FILM COATED ORAL at 20:35

## 2019-09-21 RX ADMIN — MOMETASONE FUROATE AND FORMOTEROL FUMARATE DIHYDRATE 2 PUFF: 200; 5 AEROSOL RESPIRATORY (INHALATION) at 08:54

## 2019-09-21 RX ADMIN — IPRATROPIUM BROMIDE AND ALBUTEROL SULFATE 1 AMPULE: 2.5; .5 SOLUTION RESPIRATORY (INHALATION) at 08:54

## 2019-09-21 RX ADMIN — METHYLPREDNISOLONE SODIUM SUCCINATE 40 MG: 40 INJECTION, POWDER, LYOPHILIZED, FOR SOLUTION INTRAMUSCULAR; INTRAVENOUS at 11:04

## 2019-09-21 RX ADMIN — FAMOTIDINE 20 MG: 20 TABLET ORAL at 11:03

## 2019-09-21 RX ADMIN — MOMETASONE FUROATE AND FORMOTEROL FUMARATE DIHYDRATE 2 PUFF: 200; 5 AEROSOL RESPIRATORY (INHALATION) at 21:16

## 2019-09-21 RX ADMIN — FLUTICASONE PROPIONATE 1 SPRAY: 50 SPRAY, METERED NASAL at 20:44

## 2019-09-21 RX ADMIN — ENOXAPARIN SODIUM 40 MG: 40 INJECTION SUBCUTANEOUS at 11:04

## 2019-09-21 RX ADMIN — CEFTRIAXONE 1 G: 1 INJECTION, POWDER, FOR SOLUTION INTRAMUSCULAR; INTRAVENOUS at 19:33

## 2019-09-21 RX ADMIN — ISOSORBIDE MONONITRATE 60 MG: 60 TABLET, EXTENDED RELEASE ORAL at 11:05

## 2019-09-21 RX ADMIN — PANTOPRAZOLE SODIUM 40 MG: 40 TABLET, DELAYED RELEASE ORAL at 11:00

## 2019-09-21 RX ADMIN — HYDROCODONE BITARTRATE AND ACETAMINOPHEN 1 TABLET: 7.5; 325 TABLET ORAL at 14:36

## 2019-09-21 RX ADMIN — CETIRIZINE HYDROCHLORIDE 10 MG: 10 TABLET, FILM COATED ORAL at 11:10

## 2019-09-21 RX ADMIN — GABAPENTIN 600 MG: 300 CAPSULE ORAL at 11:03

## 2019-09-21 RX ADMIN — INSULIN LISPRO 8 UNITS: 100 INJECTION, SOLUTION INTRAVENOUS; SUBCUTANEOUS at 11:20

## 2019-09-21 RX ADMIN — TIOTROPIUM BROMIDE 18 MCG: 18 CAPSULE ORAL; RESPIRATORY (INHALATION) at 08:54

## 2019-09-21 RX ADMIN — METHYLPREDNISOLONE SODIUM SUCCINATE 40 MG: 40 INJECTION, POWDER, LYOPHILIZED, FOR SOLUTION INTRAMUSCULAR; INTRAVENOUS at 23:29

## 2019-09-21 RX ADMIN — SACUBITRIL AND VALSARTAN 1 TABLET: 24; 26 TABLET, FILM COATED ORAL at 11:02

## 2019-09-21 RX ADMIN — DOXYCYCLINE 100 MG: 100 CAPSULE ORAL at 20:34

## 2019-09-21 RX ADMIN — METOPROLOL SUCCINATE 25 MG: 25 TABLET, EXTENDED RELEASE ORAL at 11:05

## 2019-09-21 RX ADMIN — MICONAZOLE NITRATE: 20 CREAM TOPICAL at 14:36

## 2019-09-21 ASSESSMENT — PAIN SCALES - GENERAL
PAINLEVEL_OUTOF10: 0
PAINLEVEL_OUTOF10: 6
PAINLEVEL_OUTOF10: 0
PAINLEVEL_OUTOF10: 10
PAINLEVEL_OUTOF10: 0
PAINLEVEL_OUTOF10: 0
PAINLEVEL_OUTOF10: 9

## 2019-09-21 ASSESSMENT — PAIN DESCRIPTION - PAIN TYPE
TYPE: CHRONIC PAIN
TYPE: SURGICAL PAIN

## 2019-09-21 ASSESSMENT — PAIN DESCRIPTION - LOCATION
LOCATION: MOUTH
LOCATION: GENERALIZED

## 2019-09-21 ASSESSMENT — PAIN DESCRIPTION - PROGRESSION
CLINICAL_PROGRESSION: NOT CHANGED
CLINICAL_PROGRESSION: NOT CHANGED

## 2019-09-21 ASSESSMENT — PAIN DESCRIPTION - ONSET
ONSET: ON-GOING
ONSET: ON-GOING

## 2019-09-21 ASSESSMENT — PAIN DESCRIPTION - FREQUENCY
FREQUENCY: INTERMITTENT
FREQUENCY: CONTINUOUS

## 2019-09-21 ASSESSMENT — PAIN DESCRIPTION - DESCRIPTORS
DESCRIPTORS: ACHING;DISCOMFORT;SORE
DESCRIPTORS: ACHING;DULL;PENETRATING

## 2019-09-21 ASSESSMENT — PAIN - FUNCTIONAL ASSESSMENT
PAIN_FUNCTIONAL_ASSESSMENT: PREVENTS OR INTERFERES WITH MANY ACTIVE NOT PASSIVE ACTIVITIES
PAIN_FUNCTIONAL_ASSESSMENT: PREVENTS OR INTERFERES SOME ACTIVE ACTIVITIES AND ADLS

## 2019-09-21 NOTE — PROGRESS NOTES
Clary Lozano Hospitalist   Progress Note    Admitting Date and Time: 9/19/2019 12:48 PM  Admit Dx: COPD (chronic obstructive pulmonary disease) ([facility] [J44.9]  COPD (chronic obstructive pulmonary disease) ([facility] [J44.9]    Subjective:    Patient was admitted with COPD (chronic obstructive pulmonary disease) ([facility] [J44.9]  COPD (chronic obstructive pulmonary disease) ([facility] [J44.9]. Patient sitting up in chair while talking on the phone. Patient appears to be feeling better as she is breathing better. Patient complains of facial/dental pain. ROS: denies fever, chills, cp, sob, n/v, HA unless stated above.      miconazole   Topical BID    methylPREDNISolone  40 mg Intravenous Q12H    aspirin  81 mg Oral Daily    bumetanide  1 mg Oral Daily    cetirizine  10 mg Oral Daily    vitamin D  1,000 Units Oral Daily    clopidogrel  75 mg Oral Daily    DULoxetine  60 mg Oral BID    gabapentin  600 mg Oral TID    isosorbide mononitrate  60 mg Oral Daily    meloxicam  7.5 mg Oral Daily    metoprolol succinate  25 mg Oral BID    mirabegron  50 mg Oral Daily    mometasone-formoterol  2 puff Inhalation Q12H    montelukast  10 mg Oral Nightly    pantoprazole  40 mg Oral BID    rosuvastatin  5 mg Oral Daily    tiotropium  18 mcg Inhalation Daily    sacubitril-valsartan  1 tablet Oral BID    famotidine  20 mg Oral BID    fluticasone  1 spray Nasal BID    ipratropium-albuterol  1 ampule Inhalation Q4H WA    insulin lispro  0-12 Units Subcutaneous TID WC    insulin lispro  0-6 Units Subcutaneous Nightly    hydrALAZINE  100 mg Oral TID    sodium chloride flush  10 mL Intravenous 2 times per day    enoxaparin  40 mg Subcutaneous Daily    doxycycline hyclate  100 mg Oral 2 times per day    cefTRIAXone (ROCEPHIN) IV  1 g Intravenous Q24H       baclofen 10 mg BID PRN   HYDROcodone-acetaminophen 1 tablet Q6H PRN   sodium chloride flush 10 mL PRN   magnesium hydroxide 30 mL Daily PRN   ondansetron

## 2019-09-21 NOTE — PROGRESS NOTES
[address] [place] [address] [facility]  SOFYAIDA  Progress Note    SUBJECTIVE:  Chief Complaint   Patient presents with    Shortness of Breath     Increasing over last few days with productive blood tingued sputum. history of COPD, didnt use inhalers today. On 4 liters oxygen at home     Cough     Patient is tolerating medications. No reported adverse drug reactions. No nausea, vomiting, diarrhea. + cough with bright red blood in sputum. +SAUNDERS with ambulation w/o O2. No fevers. C/o right facial and neck pain s/p root canal procedure 4 days ago right upper molar. Review of systems:  As stated above in the chief complaint, otherwise negative.     Medications:  Scheduled Meds:   aspirin  81 mg Oral Daily    bumetanide  1 mg Oral Daily    cetirizine  10 mg Oral Daily    vitamin D  1,000 Units Oral Daily    clopidogrel  75 mg Oral Daily    DULoxetine  60 mg Oral BID    gabapentin  600 mg Oral TID    isosorbide mononitrate  60 mg Oral Daily    meloxicam  7.5 mg Oral Daily    metoprolol succinate  25 mg Oral BID    mirabegron  50 mg Oral Daily    mometasone-formoterol  2 puff Inhalation Q12H    montelukast  10 mg Oral Nightly    pantoprazole  40 mg Oral BID    rosuvastatin  5 mg Oral Daily    tiotropium  18 mcg Inhalation Daily    sacubitril-valsartan  1 tablet Oral BID    famotidine  20 mg Oral BID    fluticasone  1 spray Nasal BID    methylPREDNISolone  40 mg Intravenous Q8H    ipratropium-albuterol  1 ampule Inhalation Q4H WA    insulin lispro  0-12 Units Subcutaneous TID WC    insulin lispro  0-6 Units Subcutaneous Nightly    hydrALAZINE  100 mg Oral TID    sodium chloride flush  10 mL Intravenous 2 times per day    enoxaparin  40 mg Subcutaneous Daily    doxycycline hyclate  100 mg Oral 2 times per day    cefTRIAXone (ROCEPHIN) IV  1 g Intravenous Q24H     Continuous Infusions:  PRN Meds:sodium chloride flush, magnesium hydroxide, ondansetron    OBJECTIVE:  BP (!) 143/78   Pulse

## 2019-09-22 LAB
ANION GAP SERPL CALCULATED.3IONS-SCNC: 8 MMOL/L (ref 7–16)
BUN BLDV-MCNC: 39 MG/DL (ref 8–23)
CALCIUM SERPL-MCNC: 9.1 MG/DL (ref 8.6–10.2)
CHLORIDE BLD-SCNC: 94 MMOL/L (ref 98–107)
CO2: 31 MMOL/L (ref 22–29)
CREAT SERPL-MCNC: 1.3 MG/DL (ref 0.5–1)
GFR AFRICAN AMERICAN: 50
GFR NON-AFRICAN AMERICAN: 50 ML/MIN/1.73
GLUCOSE BLD-MCNC: 331 MG/DL (ref 74–99)
L. PNEUMOPHILA SEROGP 1 UR AG: NORMAL
METER GLUCOSE: 267 MG/DL (ref 74–99)
METER GLUCOSE: 277 MG/DL (ref 74–99)
METER GLUCOSE: 288 MG/DL (ref 74–99)
METER GLUCOSE: 297 MG/DL (ref 74–99)
POTASSIUM SERPL-SCNC: 5.5 MMOL/L (ref 3.5–5)
SODIUM BLD-SCNC: 133 MMOL/L (ref 132–146)

## 2019-09-22 PROCEDURE — 2700000000 HC OXYGEN THERAPY PER DAY

## 2019-09-22 PROCEDURE — 6370000000 HC RX 637 (ALT 250 FOR IP): Performed by: NURSE PRACTITIONER

## 2019-09-22 PROCEDURE — 2580000003 HC RX 258

## 2019-09-22 PROCEDURE — 6360000002 HC RX W HCPCS: Performed by: PHYSICIAN ASSISTANT

## 2019-09-22 PROCEDURE — 80048 BASIC METABOLIC PNL TOTAL CA: CPT

## 2019-09-22 PROCEDURE — 36415 COLL VENOUS BLD VENIPUNCTURE: CPT

## 2019-09-22 PROCEDURE — 6370000000 HC RX 637 (ALT 250 FOR IP): Performed by: PHYSICIAN ASSISTANT

## 2019-09-22 PROCEDURE — 6360000002 HC RX W HCPCS: Performed by: INTERNAL MEDICINE

## 2019-09-22 PROCEDURE — 82962 GLUCOSE BLOOD TEST: CPT

## 2019-09-22 PROCEDURE — 94640 AIRWAY INHALATION TREATMENT: CPT

## 2019-09-22 PROCEDURE — 2580000003 HC RX 258: Performed by: INTERNAL MEDICINE

## 2019-09-22 PROCEDURE — APPSS30 APP SPLIT SHARED TIME 16-30 MINUTES: Performed by: NURSE PRACTITIONER

## 2019-09-22 PROCEDURE — 2580000003 HC RX 258: Performed by: PHYSICIAN ASSISTANT

## 2019-09-22 PROCEDURE — 2060000000 HC ICU INTERMEDIATE R&B

## 2019-09-22 PROCEDURE — 94660 CPAP INITIATION&MGMT: CPT

## 2019-09-22 PROCEDURE — 99232 SBSQ HOSP IP/OBS MODERATE 35: CPT | Performed by: INTERNAL MEDICINE

## 2019-09-22 RX ADMIN — METOPROLOL SUCCINATE 25 MG: 25 TABLET, EXTENDED RELEASE ORAL at 08:35

## 2019-09-22 RX ADMIN — INSULIN LISPRO 6 UNITS: 100 INJECTION, SOLUTION INTRAVENOUS; SUBCUTANEOUS at 16:26

## 2019-09-22 RX ADMIN — INSULIN LISPRO 6 UNITS: 100 INJECTION, SOLUTION INTRAVENOUS; SUBCUTANEOUS at 11:33

## 2019-09-22 RX ADMIN — Medication: at 08:37

## 2019-09-22 RX ADMIN — ROSUVASTATIN CALCIUM 5 MG: 5 TABLET, FILM COATED ORAL at 08:34

## 2019-09-22 RX ADMIN — DOXYCYCLINE 100 MG: 100 CAPSULE ORAL at 20:11

## 2019-09-22 RX ADMIN — PANTOPRAZOLE SODIUM 40 MG: 40 TABLET, DELAYED RELEASE ORAL at 20:18

## 2019-09-22 RX ADMIN — FAMOTIDINE 20 MG: 20 TABLET ORAL at 08:34

## 2019-09-22 RX ADMIN — IPRATROPIUM BROMIDE AND ALBUTEROL SULFATE 1 AMPULE: 2.5; .5 SOLUTION RESPIRATORY (INHALATION) at 17:19

## 2019-09-22 RX ADMIN — ISOSORBIDE MONONITRATE 60 MG: 60 TABLET, EXTENDED RELEASE ORAL at 08:35

## 2019-09-22 RX ADMIN — VITAMIN D, TAB 1000IU (100/BT) 1000 UNITS: 25 TAB at 08:34

## 2019-09-22 RX ADMIN — CEFTRIAXONE 1 G: 1 INJECTION, POWDER, FOR SOLUTION INTRAMUSCULAR; INTRAVENOUS at 18:29

## 2019-09-22 RX ADMIN — GABAPENTIN 600 MG: 300 CAPSULE ORAL at 14:38

## 2019-09-22 RX ADMIN — DOXYCYCLINE 100 MG: 100 CAPSULE ORAL at 08:34

## 2019-09-22 RX ADMIN — SACUBITRIL AND VALSARTAN 1 TABLET: 24; 26 TABLET, FILM COATED ORAL at 20:11

## 2019-09-22 RX ADMIN — HYDRALAZINE HYDROCHLORIDE 100 MG: 50 TABLET, FILM COATED ORAL at 14:39

## 2019-09-22 RX ADMIN — MOMETASONE FUROATE AND FORMOTEROL FUMARATE DIHYDRATE 2 PUFF: 200; 5 AEROSOL RESPIRATORY (INHALATION) at 08:00

## 2019-09-22 RX ADMIN — FAMOTIDINE 20 MG: 20 TABLET ORAL at 20:11

## 2019-09-22 RX ADMIN — FLUTICASONE PROPIONATE 1 SPRAY: 50 SPRAY, METERED NASAL at 08:38

## 2019-09-22 RX ADMIN — TIOTROPIUM BROMIDE 18 MCG: 18 CAPSULE ORAL; RESPIRATORY (INHALATION) at 08:00

## 2019-09-22 RX ADMIN — DULOXETINE HYDROCHLORIDE 60 MG: 60 CAPSULE, DELAYED RELEASE ORAL at 08:35

## 2019-09-22 RX ADMIN — HYDROCODONE BITARTRATE AND ACETAMINOPHEN 1 TABLET: 7.5; 325 TABLET ORAL at 11:58

## 2019-09-22 RX ADMIN — DULOXETINE HYDROCHLORIDE 60 MG: 60 CAPSULE, DELAYED RELEASE ORAL at 20:11

## 2019-09-22 RX ADMIN — WATER 10 ML: 1 INJECTION INTRAMUSCULAR; INTRAVENOUS; SUBCUTANEOUS at 23:27

## 2019-09-22 RX ADMIN — METOPROLOL SUCCINATE 25 MG: 25 TABLET, EXTENDED RELEASE ORAL at 20:11

## 2019-09-22 RX ADMIN — MELOXICAM 7.5 MG: 7.5 TABLET ORAL at 08:42

## 2019-09-22 RX ADMIN — MONTELUKAST SODIUM 10 MG: 10 TABLET, FILM COATED ORAL at 20:18

## 2019-09-22 RX ADMIN — BUMETANIDE 1 MG: 1 TABLET ORAL at 08:35

## 2019-09-22 RX ADMIN — Medication: at 20:12

## 2019-09-22 RX ADMIN — Medication 10 ML: at 08:38

## 2019-09-22 RX ADMIN — BACLOFEN 10 MG: 10 TABLET ORAL at 08:33

## 2019-09-22 RX ADMIN — GABAPENTIN 600 MG: 300 CAPSULE ORAL at 08:34

## 2019-09-22 RX ADMIN — MOMETASONE FUROATE AND FORMOTEROL FUMARATE DIHYDRATE 2 PUFF: 200; 5 AEROSOL RESPIRATORY (INHALATION) at 21:09

## 2019-09-22 RX ADMIN — IPRATROPIUM BROMIDE AND ALBUTEROL SULFATE 1 AMPULE: 2.5; .5 SOLUTION RESPIRATORY (INHALATION) at 08:00

## 2019-09-22 RX ADMIN — METHYLPREDNISOLONE SODIUM SUCCINATE 40 MG: 40 INJECTION, POWDER, LYOPHILIZED, FOR SOLUTION INTRAMUSCULAR; INTRAVENOUS at 23:27

## 2019-09-22 RX ADMIN — GABAPENTIN 600 MG: 300 CAPSULE ORAL at 20:11

## 2019-09-22 RX ADMIN — CLOPIDOGREL BISULFATE 75 MG: 75 TABLET, FILM COATED ORAL at 08:34

## 2019-09-22 RX ADMIN — HYDRALAZINE HYDROCHLORIDE 100 MG: 50 TABLET, FILM COATED ORAL at 08:35

## 2019-09-22 RX ADMIN — HYDRALAZINE HYDROCHLORIDE 100 MG: 50 TABLET, FILM COATED ORAL at 20:11

## 2019-09-22 RX ADMIN — ASPIRIN 81 MG 81 MG: 81 TABLET ORAL at 08:35

## 2019-09-22 RX ADMIN — Medication 10 ML: at 20:12

## 2019-09-22 RX ADMIN — ENOXAPARIN SODIUM 40 MG: 40 INJECTION SUBCUTANEOUS at 08:35

## 2019-09-22 RX ADMIN — METHYLPREDNISOLONE SODIUM SUCCINATE 40 MG: 40 INJECTION, POWDER, LYOPHILIZED, FOR SOLUTION INTRAMUSCULAR; INTRAVENOUS at 11:58

## 2019-09-22 RX ADMIN — INSULIN LISPRO 3 UNITS: 100 INJECTION, SOLUTION INTRAVENOUS; SUBCUTANEOUS at 20:13

## 2019-09-22 RX ADMIN — HYDROCODONE BITARTRATE AND ACETAMINOPHEN 1 TABLET: 7.5; 325 TABLET ORAL at 04:13

## 2019-09-22 RX ADMIN — CETIRIZINE HYDROCHLORIDE 10 MG: 10 TABLET, FILM COATED ORAL at 08:34

## 2019-09-22 RX ADMIN — IPRATROPIUM BROMIDE AND ALBUTEROL SULFATE 1 AMPULE: 2.5; .5 SOLUTION RESPIRATORY (INHALATION) at 21:09

## 2019-09-22 RX ADMIN — INSULIN LISPRO 6 UNITS: 100 INJECTION, SOLUTION INTRAVENOUS; SUBCUTANEOUS at 06:16

## 2019-09-22 RX ADMIN — PANTOPRAZOLE SODIUM 40 MG: 40 TABLET, DELAYED RELEASE ORAL at 08:34

## 2019-09-22 RX ADMIN — IPRATROPIUM BROMIDE AND ALBUTEROL SULFATE 1 AMPULE: 2.5; .5 SOLUTION RESPIRATORY (INHALATION) at 12:37

## 2019-09-22 RX ADMIN — FLUTICASONE PROPIONATE 1 SPRAY: 50 SPRAY, METERED NASAL at 20:12

## 2019-09-22 RX ADMIN — SACUBITRIL AND VALSARTAN 1 TABLET: 24; 26 TABLET, FILM COATED ORAL at 08:35

## 2019-09-22 RX ADMIN — HYDROCODONE BITARTRATE AND ACETAMINOPHEN 1 TABLET: 7.5; 325 TABLET ORAL at 20:10

## 2019-09-22 ASSESSMENT — PAIN SCALES - GENERAL
PAINLEVEL_OUTOF10: 5
PAINLEVEL_OUTOF10: 8
PAINLEVEL_OUTOF10: 5
PAINLEVEL_OUTOF10: 0
PAINLEVEL_OUTOF10: 4
PAINLEVEL_OUTOF10: 0
PAINLEVEL_OUTOF10: 5

## 2019-09-22 ASSESSMENT — PAIN DESCRIPTION - LOCATION
LOCATION: HAND
LOCATION: LEG
LOCATION: HEAD

## 2019-09-22 ASSESSMENT — PAIN DESCRIPTION - DESCRIPTORS
DESCRIPTORS: ACHING;HEADACHE
DESCRIPTORS: ACHING;HEADACHE
DESCRIPTORS: ACHING;DISCOMFORT;SORE

## 2019-09-22 ASSESSMENT — PAIN DESCRIPTION - PAIN TYPE
TYPE: CHRONIC PAIN
TYPE: ACUTE PAIN
TYPE: ACUTE PAIN

## 2019-09-22 ASSESSMENT — PAIN DESCRIPTION - ORIENTATION
ORIENTATION: LEFT
ORIENTATION: MID

## 2019-09-22 ASSESSMENT — PAIN DESCRIPTION - PROGRESSION: CLINICAL_PROGRESSION: GRADUALLY WORSENING

## 2019-09-22 ASSESSMENT — PAIN DESCRIPTION - ONSET: ONSET: ON-GOING

## 2019-09-22 ASSESSMENT — PAIN - FUNCTIONAL ASSESSMENT: PAIN_FUNCTIONAL_ASSESSMENT: PREVENTS OR INTERFERES SOME ACTIVE ACTIVITIES AND ADLS

## 2019-09-22 ASSESSMENT — PAIN DESCRIPTION - FREQUENCY: FREQUENCY: CONTINUOUS

## 2019-09-22 NOTE — PROGRESS NOTES
Date: 9/21/2019    Time: 9:33 PM    Patient Placed On BIPAP/CPAP/ Non-Invasive Ventilation? Yes    If no must comment. Facial area red/color change? No           If YES are Blister/Lesion present? No   If yes must notify nursing staff  BIPAP/CPAP skin barrier?   Yes    Skin barrier type:Liquicel       Comments:        [place]

## 2019-09-22 NOTE — PROGRESS NOTES
Date: 9/22/2019    Time: 1:05 AM    Patient Placed On BIPAP/CPAP/ Non-Invasive Ventilation? No    If no must comment. Facial area red/color change? No           If YES are Blister/Lesion present? No   If yes must notify nursing staff  BIPAP/CPAP skin barrier?   Yes    Skin barrier type:Liquicel       Comments:        Jacqui Jim

## 2019-09-22 NOTE — PROGRESS NOTES
KB    enoxaparin (LOVENOX) injection 40 mg, 40 mg, Subcutaneous, Daily, Dulce Stafford PA-C, 40 mg at 09/22/19 0835    doxycycline hyclate (VIBRAMYCIN) capsule 100 mg, 100 mg, Oral, 2 times per day, Dulce Stafford PA-C, 100 mg at 09/22/19 0834    cefTRIAXone (ROCEPHIN) 1 g in dextrose 5 % 50 mL IVPB (vial-mate), 1 g, Intravenous, Q24H, Reza Mercado MD, Stopped at 09/21/19 2032  /75   Pulse 62   Temp 98.2 °F (36.8 °C) (Oral)   Resp 18   Ht 5' 1\" (1.549 m)   Wt 242 lb 4.8 oz (109.9 kg)   LMP 01/01/1990   SpO2 94%   BMI 45.78 kg/m²     General: No distress  Chest: Symmetric, no accessory use  Heart: RRR  Lungs: Coarse mid lung rales bilaterally  Abdomen: Soft, nt, nd  Extremities: No edema    Intake/Output Summary (Last 24 hours) at 9/22/2019 1203  Last data filed at 9/22/2019 1014  Gross per 24 hour   Intake 400 ml   Output 2625 ml   Net -2225 ml     BMP:    Lab Results   Component Value Date     09/22/2019    K 5.5 09/22/2019    K 5.4 09/20/2019    CL 94 09/22/2019    CO2 31 09/22/2019    BUN 39 09/22/2019    LABALBU 3.8 09/20/2019    LABALBU 5.2 12/01/2011    CREATININE 1.3 09/22/2019    CALCIUM 9.1 09/22/2019    GFRAA 50 09/22/2019    LABGLOM 50 09/22/2019    GLUCOSE 331 09/22/2019    GLUCOSE 181 12/01/2011       IMPRESSION:   Patient Active Problem List   Diagnosis    Morbid obesity due to excess calories ([address]    Hyperlipidemia    DAYAN and COPD overlap syndrome ([address]    Vitamin D insufficiency    Chronic combined systolic and diastolic heart failure ([address]    Essential hypertension    Pneumonia due to organism    GERD (gastroesophageal reflux disease)    Major depressive disorder, recurrent episode, mild ([address]    Diabetic polyneuropathy associated with type 2 diabetes mellitus (HCC)    Chronic passive hepatic congestion    Mixed incontinence urge and stress    Chronic back pain - d/t muscle spasm    Glaucoma, open angle    Elevated CA 19-9 level    Lumbar stenosis   

## 2019-09-22 NOTE — PROGRESS NOTES
[address] [place] [address] [facility]  KEI  Progress Note    SUBJECTIVE:  Chief Complaint   Patient presents with    Shortness of Breath     Increasing over last few days with productive blood tingued sputum. history of COPD, didnt use inhalers today. On 4 liters oxygen at home     Cough     Patient is tolerating medications. No reported adverse drug reactions. No nausea, vomiting, diarrhea. + cough less blood in sputum. Some stress incontinence bowel/bladder with cough. Breathing overall improving. No fevers. Remains on O2 via nc    Review of systems:  As stated above in the chief complaint, otherwise negative.     Medications:  Scheduled Meds:   methylPREDNISolone  40 mg Intravenous Q12H    econazole nitrate   Topical BID    aspirin  81 mg Oral Daily    bumetanide  1 mg Oral Daily    cetirizine  10 mg Oral Daily    vitamin D  1,000 Units Oral Daily    clopidogrel  75 mg Oral Daily    DULoxetine  60 mg Oral BID    gabapentin  600 mg Oral TID    isosorbide mononitrate  60 mg Oral Daily    meloxicam  7.5 mg Oral Daily    metoprolol succinate  25 mg Oral BID    mirabegron  50 mg Oral Daily    mometasone-formoterol  2 puff Inhalation Q12H    montelukast  10 mg Oral Nightly    pantoprazole  40 mg Oral BID    rosuvastatin  5 mg Oral Daily    tiotropium  18 mcg Inhalation Daily    sacubitril-valsartan  1 tablet Oral BID    famotidine  20 mg Oral BID    fluticasone  1 spray Nasal BID    ipratropium-albuterol  1 ampule Inhalation Q4H WA    insulin lispro  0-12 Units Subcutaneous TID WC    insulin lispro  0-6 Units Subcutaneous Nightly    hydrALAZINE  100 mg Oral TID    sodium chloride flush  10 mL Intravenous 2 times per day    enoxaparin  40 mg Subcutaneous Daily    doxycycline hyclate  100 mg Oral 2 times per day    cefTRIAXone (ROCEPHIN) IV  1 g Intravenous Q24H     Continuous Infusions:  PRN Meds:baclofen, HYDROcodone-acetaminophen, sodium chloride flush, magnesium hydroxide, ondansetron    OBJECTIVE:  /75   Pulse 62   Temp 98.2 °F (36.8 °C) (Oral)   Resp 18   Ht 5' 1\" (1.549 m)   Wt 242 lb 4.8 oz (109.9 kg)   LMP 1990   SpO2 94%   BMI 45.78 kg/m²   Temp  Av.2 °F (36.8 °C)  Min: 98.1 °F (36.7 °C)  Max: 98.3 °F (36.8 °C)  Constitutional: The patient is awake, alert, and oriented. In NAD. Ambulating in room  Skin: Warm and dry. No rashes were noted. HEENT: Round and reactive pupils. Moist mucous membranes. No ulcerations or thrush. No inflammation or pus along right upper molars. Neck: Supple to movements. No cervical lymphadenopathy  Chest: No use of accessory muscles to breathe. Improved aeration b/l  Cardiovascular: S1 and S2 are rhythmic and regular. No murmurs appreciated. Abdomen: Positive bowel sounds to auscultation. Benign to palpation. Morbid truncal obesity  Extremities: No clubbing, no cyanosis, no edema.   Lines: peripheral    Laboratory and Tests Review:  Lab Results   Component Value Date    WBC 11.0 2019    WBC 12.5 (H) 2019    WBC 7.6 2019    HGB 12.3 2019    HCT 40.5 2019    .0 (H) 2019     2019     Lab Results   Component Value Date    NEUTROABS 10.67 (H) 2019    NEUTROABS 11.35 (H) 2019    NEUTROABS 6.24 2019     No results found for: [facility]  Lab Results   Component Value Date    ALT 21 2019    AST 14 2019    ALKPHOS 73 2019    BILITOT 0.4 2019     Lab Results   Component Value Date     2019    K 5.4 2019    CL 95 2019    CO2 28 2019    BUN 25 2019    CREATININE 1.3 2019    CREATININE 0.9 2019    CREATININE 1.3 2019    GFRAA 50 2019    LABGLOM 50 2019    GLUCOSE 214 2019    GLUCOSE 181 2011    PROT 7.4 2019    LABALBU 3.8 2019    LABALBU 5.2 2011    CALCIUM 9.4 2019    BILITOT 0.4 2019    ALKPHOS 73 2019    AST 14 2019    ALT 21 09/20/2019     Lab Results   Component Value Date    CRP 0.7 (H) 11/08/2018    CRP 0.8 (H) 06/05/2018    CRP 1.0 (H) 11/07/2017     Lab Results   Component Value Date    SEDRATE 11 04/17/2019    SEDRATE 17 11/08/2018    SEDRATE 30 (H) 06/05/2018     Radiology:  V/Q scan   There is a low probability of pulmonary thromboembolic   disease. Microbiology:   Blood cx no growth  Sputum gram stain reflects heavily contaminated, oral charlie  Resp viral panel neg  Urine cx <10k col GPOs  Influenza neg  Urine legionella neg  Urine strep pneumo neg    ASSESSMENT:  · community-acquired pneumonia - improving clinically  · Respiratory failure with COPD exacerbation  · Acute onset diarrhea with recent clindamycin exposure     PLAN:    · iv Rocephin and doxycycline  · F/u cx  · Monitor labs  · Will follow with you    April [address] [facility],[address]  10:49 AM  9/22/2019    resp sample not a good one  Pt seen and examined. I discussed and co-formulated the plan with advanced practice nurse. Labs, cultures, and radiographs reviewed. Face to Face encounter occurred. Changes made as necessary by me.      Silas Ko MD  Infectious Disease

## 2019-09-23 LAB
ANION GAP SERPL CALCULATED.3IONS-SCNC: 9 MMOL/L (ref 7–16)
BUN BLDV-MCNC: 37 MG/DL (ref 8–23)
CALCIUM SERPL-MCNC: 9 MG/DL (ref 8.6–10.2)
CHLORIDE BLD-SCNC: 95 MMOL/L (ref 98–107)
CO2: 32 MMOL/L (ref 22–29)
CREAT SERPL-MCNC: 1.4 MG/DL (ref 0.5–1)
GFR AFRICAN AMERICAN: 46
GFR NON-AFRICAN AMERICAN: 46 ML/MIN/1.73
GLUCOSE BLD-MCNC: 279 MG/DL (ref 74–99)
METER GLUCOSE: 263 MG/DL (ref 74–99)
METER GLUCOSE: 279 MG/DL (ref 74–99)
METER GLUCOSE: 316 MG/DL (ref 74–99)
METER GLUCOSE: 359 MG/DL (ref 74–99)
POTASSIUM SERPL-SCNC: 5.7 MMOL/L (ref 3.5–5)
SODIUM BLD-SCNC: 136 MMOL/L (ref 132–146)

## 2019-09-23 PROCEDURE — 80048 BASIC METABOLIC PNL TOTAL CA: CPT

## 2019-09-23 PROCEDURE — 2700000000 HC OXYGEN THERAPY PER DAY

## 2019-09-23 PROCEDURE — 94660 CPAP INITIATION&MGMT: CPT

## 2019-09-23 PROCEDURE — 6360000002 HC RX W HCPCS: Performed by: INTERNAL MEDICINE

## 2019-09-23 PROCEDURE — 2580000003 HC RX 258: Performed by: PHYSICIAN ASSISTANT

## 2019-09-23 PROCEDURE — 6370000000 HC RX 637 (ALT 250 FOR IP): Performed by: PHYSICIAN ASSISTANT

## 2019-09-23 PROCEDURE — 2580000003 HC RX 258

## 2019-09-23 PROCEDURE — 6360000002 HC RX W HCPCS: Performed by: PHYSICIAN ASSISTANT

## 2019-09-23 PROCEDURE — 6370000000 HC RX 637 (ALT 250 FOR IP): Performed by: NURSE PRACTITIONER

## 2019-09-23 PROCEDURE — 97530 THERAPEUTIC ACTIVITIES: CPT

## 2019-09-23 PROCEDURE — 82962 GLUCOSE BLOOD TEST: CPT

## 2019-09-23 PROCEDURE — 36415 COLL VENOUS BLD VENIPUNCTURE: CPT

## 2019-09-23 PROCEDURE — 97535 SELF CARE MNGMENT TRAINING: CPT

## 2019-09-23 PROCEDURE — 94640 AIRWAY INHALATION TREATMENT: CPT

## 2019-09-23 PROCEDURE — APPSS30 APP SPLIT SHARED TIME 16-30 MINUTES: Performed by: PHYSICIAN ASSISTANT

## 2019-09-23 PROCEDURE — 99232 SBSQ HOSP IP/OBS MODERATE 35: CPT | Performed by: INTERNAL MEDICINE

## 2019-09-23 PROCEDURE — 2060000000 HC ICU INTERMEDIATE R&B

## 2019-09-23 PROCEDURE — 2580000003 HC RX 258: Performed by: INTERNAL MEDICINE

## 2019-09-23 RX ORDER — SODIUM POLYSTYRENE SULFONATE 15 G/60ML
15 SUSPENSION ORAL; RECTAL ONCE
Status: COMPLETED | OUTPATIENT
Start: 2019-09-23 | End: 2019-09-23

## 2019-09-23 RX ORDER — DEXTROSE MONOHYDRATE 25 G/50ML
12.5 INJECTION, SOLUTION INTRAVENOUS PRN
Status: DISCONTINUED | OUTPATIENT
Start: 2019-09-23 | End: 2019-09-24 | Stop reason: HOSPADM

## 2019-09-23 RX ORDER — METHYLPREDNISOLONE SODIUM SUCCINATE 40 MG/ML
40 INJECTION, POWDER, LYOPHILIZED, FOR SOLUTION INTRAMUSCULAR; INTRAVENOUS DAILY
Status: DISCONTINUED | OUTPATIENT
Start: 2019-09-24 | End: 2019-09-24

## 2019-09-23 RX ORDER — NICOTINE POLACRILEX 4 MG
15 LOZENGE BUCCAL PRN
Status: DISCONTINUED | OUTPATIENT
Start: 2019-09-23 | End: 2019-09-24 | Stop reason: HOSPADM

## 2019-09-23 RX ORDER — DOXYCYCLINE HYCLATE 100 MG/1
100 CAPSULE ORAL EVERY 12 HOURS SCHEDULED
Qty: 14 CAPSULE | Refills: 0 | Status: SHIPPED | OUTPATIENT
Start: 2019-09-23 | End: 2019-09-30

## 2019-09-23 RX ORDER — DEXTROSE MONOHYDRATE 50 MG/ML
100 INJECTION, SOLUTION INTRAVENOUS PRN
Status: DISCONTINUED | OUTPATIENT
Start: 2019-09-23 | End: 2019-09-24 | Stop reason: HOSPADM

## 2019-09-23 RX ADMIN — Medication: at 21:16

## 2019-09-23 RX ADMIN — CETIRIZINE HYDROCHLORIDE 10 MG: 10 TABLET, FILM COATED ORAL at 09:24

## 2019-09-23 RX ADMIN — HYDRALAZINE HYDROCHLORIDE 100 MG: 50 TABLET, FILM COATED ORAL at 14:51

## 2019-09-23 RX ADMIN — METHYLPREDNISOLONE SODIUM SUCCINATE 40 MG: 40 INJECTION, POWDER, LYOPHILIZED, FOR SOLUTION INTRAMUSCULAR; INTRAVENOUS at 12:47

## 2019-09-23 RX ADMIN — MOMETASONE FUROATE AND FORMOTEROL FUMARATE DIHYDRATE 2 PUFF: 200; 5 AEROSOL RESPIRATORY (INHALATION) at 20:21

## 2019-09-23 RX ADMIN — Medication: at 09:28

## 2019-09-23 RX ADMIN — BUMETANIDE 1 MG: 1 TABLET ORAL at 09:25

## 2019-09-23 RX ADMIN — FAMOTIDINE 20 MG: 20 TABLET ORAL at 21:15

## 2019-09-23 RX ADMIN — SACUBITRIL AND VALSARTAN 1 TABLET: 24; 26 TABLET, FILM COATED ORAL at 09:25

## 2019-09-23 RX ADMIN — SACUBITRIL AND VALSARTAN 1 TABLET: 24; 26 TABLET, FILM COATED ORAL at 22:14

## 2019-09-23 RX ADMIN — Medication 10 ML: at 09:28

## 2019-09-23 RX ADMIN — FLUTICASONE PROPIONATE 1 SPRAY: 50 SPRAY, METERED NASAL at 21:16

## 2019-09-23 RX ADMIN — IPRATROPIUM BROMIDE AND ALBUTEROL SULFATE 1 AMPULE: 2.5; .5 SOLUTION RESPIRATORY (INHALATION) at 10:01

## 2019-09-23 RX ADMIN — HYDRALAZINE HYDROCHLORIDE 100 MG: 50 TABLET, FILM COATED ORAL at 09:24

## 2019-09-23 RX ADMIN — DULOXETINE HYDROCHLORIDE 60 MG: 60 CAPSULE, DELAYED RELEASE ORAL at 09:23

## 2019-09-23 RX ADMIN — FLUTICASONE PROPIONATE 1 SPRAY: 50 SPRAY, METERED NASAL at 09:23

## 2019-09-23 RX ADMIN — SODIUM POLYSTYRENE SULFONATE 15 G: 15 SUSPENSION ORAL; RECTAL at 12:47

## 2019-09-23 RX ADMIN — DOXYCYCLINE 100 MG: 100 CAPSULE ORAL at 09:25

## 2019-09-23 RX ADMIN — ENOXAPARIN SODIUM 40 MG: 40 INJECTION SUBCUTANEOUS at 09:26

## 2019-09-23 RX ADMIN — GABAPENTIN 600 MG: 300 CAPSULE ORAL at 21:15

## 2019-09-23 RX ADMIN — ASPIRIN 81 MG 81 MG: 81 TABLET ORAL at 09:24

## 2019-09-23 RX ADMIN — ROSUVASTATIN CALCIUM 5 MG: 5 TABLET, FILM COATED ORAL at 09:24

## 2019-09-23 RX ADMIN — FAMOTIDINE 20 MG: 20 TABLET ORAL at 09:26

## 2019-09-23 RX ADMIN — TIOTROPIUM BROMIDE 18 MCG: 18 CAPSULE ORAL; RESPIRATORY (INHALATION) at 10:00

## 2019-09-23 RX ADMIN — MONTELUKAST SODIUM 10 MG: 10 TABLET, FILM COATED ORAL at 22:15

## 2019-09-23 RX ADMIN — PANTOPRAZOLE SODIUM 40 MG: 40 TABLET, DELAYED RELEASE ORAL at 09:26

## 2019-09-23 RX ADMIN — GABAPENTIN 600 MG: 300 CAPSULE ORAL at 09:25

## 2019-09-23 RX ADMIN — INSULIN LISPRO 12 UNITS: 100 INJECTION, SOLUTION INTRAVENOUS; SUBCUTANEOUS at 16:47

## 2019-09-23 RX ADMIN — Medication 10 ML: at 22:19

## 2019-09-23 RX ADMIN — IPRATROPIUM BROMIDE AND ALBUTEROL SULFATE 1 AMPULE: 2.5; .5 SOLUTION RESPIRATORY (INHALATION) at 17:07

## 2019-09-23 RX ADMIN — GABAPENTIN 600 MG: 300 CAPSULE ORAL at 14:51

## 2019-09-23 RX ADMIN — PANTOPRAZOLE SODIUM 40 MG: 40 TABLET, DELAYED RELEASE ORAL at 22:15

## 2019-09-23 RX ADMIN — METOPROLOL SUCCINATE 25 MG: 25 TABLET, EXTENDED RELEASE ORAL at 22:14

## 2019-09-23 RX ADMIN — CLOPIDOGREL BISULFATE 75 MG: 75 TABLET, FILM COATED ORAL at 09:23

## 2019-09-23 RX ADMIN — HYDROCODONE BITARTRATE AND ACETAMINOPHEN 1 TABLET: 7.5; 325 TABLET ORAL at 07:29

## 2019-09-23 RX ADMIN — INSULIN LISPRO 7 UNITS: 100 INJECTION, SOLUTION INTRAVENOUS; SUBCUTANEOUS at 22:20

## 2019-09-23 RX ADMIN — VITAMIN D, TAB 1000IU (100/BT) 1000 UNITS: 25 TAB at 09:24

## 2019-09-23 RX ADMIN — IPRATROPIUM BROMIDE AND ALBUTEROL SULFATE 1 AMPULE: 2.5; .5 SOLUTION RESPIRATORY (INHALATION) at 20:21

## 2019-09-23 RX ADMIN — INSULIN LISPRO 6 UNITS: 100 INJECTION, SOLUTION INTRAVENOUS; SUBCUTANEOUS at 06:28

## 2019-09-23 RX ADMIN — IPRATROPIUM BROMIDE AND ALBUTEROL SULFATE 1 AMPULE: 2.5; .5 SOLUTION RESPIRATORY (INHALATION) at 14:04

## 2019-09-23 RX ADMIN — HYDRALAZINE HYDROCHLORIDE 100 MG: 50 TABLET, FILM COATED ORAL at 21:15

## 2019-09-23 RX ADMIN — METOPROLOL SUCCINATE 25 MG: 25 TABLET, EXTENDED RELEASE ORAL at 09:24

## 2019-09-23 RX ADMIN — DOXYCYCLINE 100 MG: 100 CAPSULE ORAL at 21:15

## 2019-09-23 RX ADMIN — DULOXETINE HYDROCHLORIDE 60 MG: 60 CAPSULE, DELAYED RELEASE ORAL at 21:21

## 2019-09-23 RX ADMIN — WATER: 1 INJECTION INTRAMUSCULAR; INTRAVENOUS; SUBCUTANEOUS at 10:57

## 2019-09-23 RX ADMIN — CEFTRIAXONE 1 G: 1 INJECTION, POWDER, FOR SOLUTION INTRAMUSCULAR; INTRAVENOUS at 18:55

## 2019-09-23 RX ADMIN — ISOSORBIDE MONONITRATE 60 MG: 60 TABLET, EXTENDED RELEASE ORAL at 09:25

## 2019-09-23 RX ADMIN — MOMETASONE FUROATE AND FORMOTEROL FUMARATE DIHYDRATE 2 PUFF: 200; 5 AEROSOL RESPIRATORY (INHALATION) at 10:00

## 2019-09-23 RX ADMIN — INSULIN LISPRO 6 UNITS: 100 INJECTION, SOLUTION INTRAVENOUS; SUBCUTANEOUS at 12:47

## 2019-09-23 RX ADMIN — MELOXICAM 7.5 MG: 7.5 TABLET ORAL at 09:25

## 2019-09-23 ASSESSMENT — PAIN SCALES - GENERAL
PAINLEVEL_OUTOF10: 6
PAINLEVEL_OUTOF10: 6
PAINLEVEL_OUTOF10: 0
PAINLEVEL_OUTOF10: 0

## 2019-09-23 NOTE — PROGRESS NOTES
sodium chloride flush 10 mL PRN   magnesium hydroxide 30 mL Daily PRN   ondansetron 4 mg Q6H PRN        Objective:    /66   Pulse 58   Temp 97.8 °F (36.6 °C) (Oral)   Resp 18   Ht 5' 1\" (1.549 m)   Wt 242 lb 4.8 oz (109.9 kg)   LMP 01/01/1990   SpO2 98%   BMI 45.78 kg/m²   General Appearance: alert and oriented to person, place and time, well-developed and well-nourished, in no acute distress  Skin: warm and dry, no rash or erythema  Head: normocephalic and atraumatic  Pulmonary/Chest: bilateral coarse breath sounds, 4 L NC, no respiratory distress noted   Cardiovascular: normal rate, normal S1 and S2, no gallops and intact distal pulses  Abdomen: soft, non-tender, non-distended, normal bowel sounds, no masses or organomegaly  Extremities: no cyanosis, no clubbing and no edema  Musculoskeletal: normal range of motion, no joint swelling, deformity or tenderness  Neurologic: cranial nerves grossly intact, speech normal      Recent Labs     09/20/19  1410 09/22/19  1048 09/23/19  0833    133 136   K 5.4* 5.5* 5.7*   CL 95* 94* 95*   CO2 28 31* 32*   BUN 25* 39* 37*   CREATININE 1.3* 1.3* 1.4*   GLUCOSE 214* 331* 279*   CALCIUM 9.4 9.1 9.0       Recent Labs     09/20/19  1410   WBC 11.0   RBC 3.97   HGB 12.3   HCT 40.5   .0*   MCH 31.0   MCHC 30.4*   RDW 13.5      MPV 10.8        Radiology:   NM LUNG VENT/PERFUSION (VQ)   Final Result   There is a low probability of pulmonary thromboembolic   disease. XR CHEST PORTABLE   Final Result      Cardiomegaly with with median sternotomy      There is ill-defined airspace consolidation seen throughout both lung   more pronounced on the right lower suspicion for pneumonic process.              Assessment:    Principal Problem:    COPD (chronic obstructive pulmonary disease) (HCC)  Active Problems:    Chronic combined systolic and diastolic heart failure (HCC)    DAYAN and COPD overlap syndrome ([address]    Morbid obesity due to excess calories soon and pt needs to do for herself as much as possible so she can remain functional at home. Copd exacerbation  Bacterial pneumonia  Hyperkalemia  dana  hyperlipidemia  dm type 2 uncontrolled      Electronically signed by Joby Sparks D.O.   Hospitalist  [facility] at [facility]

## 2019-09-23 NOTE — PROGRESS NOTES
established Physical Therapy goals as per increased functional mobility performed. Continue with physical therapy current plan of care.     Saintclair Sharp PTA   License Number: [id number]

## 2019-09-23 NOTE — PROGRESS NOTES
Occupational Therapy  OT BEDSIDE TREATMENT NOTE      Date:2019  Patient Name: Mounika Crockett  MRN: [id number]  : 1955  Room: [address] [place], [place] [address]     Evaluating OT: Eve Logan OTR/L [id number]     AM-PAC Daily Activity Raw Score: 17     Recommended Adaptive Equipment: continue to assess      Diagnosis: COPD. Pt presents to ED from home with SOB and cough. Pertinent Medical History: cerbellum infarct, CAD, DM, neuropathy, HTN, PVD  Precautions:  Falls, O2     Home Living: Pt lives alone in a single story home with 2 steps on entry. Bathroom setup: tub/shower combo with seat      Prior Level of Function: cg assist in LB ADLs, cg and grandson manage IADLs; completed functional mobility with [address] in home, rollator out of home. Pt has pd cg assist 7x/wk for 6hrs. Pt wears O2 at night only.      Pain Level: chronic back pain     Cognition: Alert and oriented. Functional Assessment:    Initial Eval Status  Date: 19 Treatment session:  Short Term Goals  Treatment frequency: 2-5x/wk PRN x1-3 wks   Feeding Independent       Grooming Set up Supervision while standing at the sink   Independent   UB Dressing Set up   Independent   LB Dressing Mod A   Min A   Bathing Mod A   Min A   Toileting Min A SBA   Mod I   Bed Mobility  Supine to sit: NT seated in chair on entry SBA supine to sit       Functional Transfers STS: CGA to Min A  Multiple trials increased difficulty upon fatiguing SBA from bed, chair, and toilet   Mod I   Functional Mobility CGA with [address]  Household distance  2 standing rest breaks secondary to fatigue and SOB SBA using w/w   Mod I during ADLs   Balance Sitting: fair     Standing: fair at [address] Stand balance good-       Activity Tolerance Poor plus. 4L O2 94% resting, 90% post mobility min recovery time with education on pursed lip breathing techniques O2 95% during activity.   Overall fair- tolerance for functional activity.   standing marlon x6-7 min with fair plus balance during self care tasks       Comments:  Pt remained in chair at end of the session. Education: energy conservation     Other: pt with complaint of sore throat and headache. Nursing notified. · Pt has made  progress towards set goals.    · Continue with current plan of care      Total Tx Time: [address] [place]/L [id number]

## 2019-09-23 NOTE — PROGRESS NOTES
57 yo female known to our service with COPD/asthma overlap, DAYAN, obesity hypoventilation now admitted (9/19) with cough, hemoptysis and probable pneumoina    CC/Overnight events:   Continues to cough - bringing up yellow mucus - still some hemoptysis but less. Fairly comfortable with breathing but is complaining of marked pain in throat with cough and pain over right face which is interfering with sleep.     Current Facility-Administered Medications   Medication Dose Route Frequency Provider Last Rate Last Dose    insulin lispro (HUMALOG) injection vial 0-18 Units  0-18 Units Subcutaneous TID WC TONY Garcia   12 Units at 09/23/19 1647    insulin lispro (HUMALOG) injection vial 0-9 Units  0-9 Units Subcutaneous Nightly ArinTONY Kinsey        glucose (GLUTOSE) 40 % oral gel 15 g  15 g Oral PRN TONY Garcia        dextrose 50 % IV solution  12.5 g Intravenous PRN TONY Garcia        glucagon (rDNA) injection 1 mg  1 mg Intramuscular PRN Arin TONY Taylor        dextrose 5 % solution  100 mL/hr Intravenous PRN TONY Garcia        sterile water injection             baclofen (LIORESAL) tablet 10 mg  10 mg Oral BID PRN Dc Meals, APRN - CNP   10 mg at 09/22/19 0833    HYDROcodone-acetaminophen (NORCO) 7.5-325 MG per tablet 1 tablet  1 tablet Oral Q6H PRN Dc Meals, APRN - CNP   1 tablet at 09/23/19 0729    methylPREDNISolone sodium (SOLU-MEDROL) injection 40 mg  40 mg Intravenous Q12H Idalia Jimenez MD   40 mg at 09/23/19 1247    econazole nitrate 1 % cream   Topical BID Dc Meals, APRN - CNP        aspirin chewable tablet 81 mg  81 mg Oral Daily Dulce Stafford PA-C   81 mg at 09/23/19 0924    bumetanide (BUMEX) tablet 1 mg  1 mg Oral Daily Dulce Stafford PA-C   1 mg at 09/23/19 0925    cetirizine (ZYRTEC) tablet 10 mg  10 mg Oral Daily Dulce Stafford PA-C   10 mg at 09/23/19 1453    vitamin D (CHOLECALCIFEROL) tablet 1,000 Units  1,000 Units Oral Daily 09/20/2019    HGB 12.3 09/20/2019    HCT 40.5 09/20/2019     09/20/2019    .0 09/20/2019    MCH 31.0 09/20/2019    MCHC 30.4 09/20/2019    RDW 13.5 09/20/2019    NRBC 0.0 09/20/2019    SEGSPCT 84 03/03/2014    LYMPHOPCT 3.5 09/20/2019    MONOPCT 0.0 09/20/2019    BASOPCT 0.0 09/20/2019    MONOSABS 0.00 09/20/2019    LYMPHSABS 0.44 09/20/2019    EOSABS 0.00 09/20/2019    BASOSABS 0.00 09/20/2019     CMP:    Lab Results   Component Value Date     09/23/2019    K 5.7 09/23/2019    K 5.4 09/20/2019    CL 95 09/23/2019    CO2 32 09/23/2019    BUN 37 09/23/2019    CREATININE 1.4 09/23/2019    GFRAA 46 09/23/2019    LABGLOM 46 09/23/2019    GLUCOSE 279 09/23/2019    GLUCOSE 181 12/01/2011    PROT 7.4 09/20/2019    LABALBU 3.8 09/20/2019    LABALBU 5.2 12/01/2011    CALCIUM 9.0 09/23/2019    BILITOT 0.4 09/20/2019    ALKPHOS 73 09/20/2019    AST 14 09/20/2019    ALT 21 09/20/2019     ABG:    Lab Results   Component Value Date    PH 7.400 09/19/2019    PCO2 44.6 09/19/2019    PO2 65.6 09/19/2019    HCO3 27.0 09/19/2019    BE 1.8 09/19/2019    O2SAT 92.7 09/19/2019         CXR reviewed:   NM LUNG VENT/PERFUSION (VQ)   Final Result   There is a low probability of pulmonary thromboembolic   disease. XR CHEST PORTABLE   Final Result      Cardiomegaly with with median sternotomy      There is ill-defined airspace consolidation seen throughout both lung   more pronounced on the right lower suspicion for pneumonic process. Impression:  1. Pneumonia  2. Hemoptysis likely due to   3. Throat pain and right sided tender adenopathy - possible viral component or sinusitis component  4. /Asthma  5.  Obesity hypoventilation / DAYAN - on BiPAP    Principal Problem:    COPD (chronic obstructive pulmonary disease) (HCC)  Active Problems:    Chronic combined systolic and diastolic heart failure (HCC)    DAYAN and COPD overlap syndrome ([address]    Morbid obesity due to excess calories ([address]    Pneumonia due to organism    Acute respiratory failure with hypoxia ([facility]    Controlled type 2 diabetes mellitus without complication ([facility]  Resolved Problems:    * No resolved hospital problems. *      Plans:   1. Complete antibiotics per ID  2. Symptomatic therapy for pain - not a good candidate for decongestants as she is significantly hypertensive  3. Continue nebs, reduce IV solumedrol  4.  Continue nocturna BiPAP therapy    Filomena Pallas MD, FACP, [facility]

## 2019-09-23 NOTE — PROGRESS NOTES
Date: 9/22/2019    Time: 9:28 PM    Patient Placed On BIPAP/CPAP/ Non-Invasive Ventilation? Yes    If no must comment. Facial area red/color change? No           If YES are Blister/Lesion present? No   If yes must notify nursing staff  BIPAP/CPAP skin barrier?   Yes    Skin barrier type:Liquicel       Comments:        Patricio Patel

## 2019-09-23 NOTE — PROGRESS NOTES
ondansetron    OBJECTIVE:  /66   Pulse 58   Temp 97.8 °F (36.6 °C) (Oral)   Resp 18   Ht 5' 1\" (1.549 m)   Wt 242 lb 4.8 oz (109.9 kg)   LMP 1990   SpO2 98%   BMI 45.78 kg/m²   Temp  Av.4 °F (36.9 °C)  Min: 97.8 °F (36.6 °C)  Max: 98.8 °F (37.1 °C)  Constitutional: The patient is awake, alert, and oriented. In NAD. Ambulating in room  Skin: Warm and dry. No rashes were noted. HEENT: Round and reactive pupils. Moist mucous membranes. No ulcerations or thrush. No inflammation or pus along right upper molars. Neck: Supple to movements. No cervical lymphadenopathy  Chest: No use of accessory muscles to breathe. Improved aeration b/l  Cardiovascular: S1 and S2 are rhythmic and regular. No murmurs appreciated. Abdomen: Positive bowel sounds to auscultation. Benign to palpation. Morbid truncal obesity  Extremities: No clubbing, no cyanosis, no edema.   Lines: peripheral    Laboratory and Tests Review:  Lab Results   Component Value Date    WBC 11.0 2019    WBC 12.5 (H) 2019    WBC 7.6 2019    HGB 12.3 2019    HCT 40.5 2019    .0 (H) 2019     2019     Lab Results   Component Value Date    NEUTROABS 10.67 (H) 2019    NEUTROABS 11.35 (H) 2019    NEUTROABS 6.24 2019     No results found for: [facility]  Lab Results   Component Value Date    ALT 21 2019    AST 14 2019    ALKPHOS 73 2019    BILITOT 0.4 2019     Lab Results   Component Value Date     2019    K 5.7 2019    K 5.4 2019    CL 95 2019    CO2 32 2019    BUN 37 2019    CREATININE 1.4 2019    CREATININE 1.3 2019    CREATININE 1.3 2019    GFRAA 46 2019    LABGLOM 46 2019    GLUCOSE 279 2019    GLUCOSE 181 2011    PROT 7.4 2019    LABALBU 3.8 2019    LABALBU 5.2 2011    CALCIUM 9.0 2019    BILITOT 0.4 2019    ALKPHOS 73 2019    AST

## 2019-09-24 VITALS
RESPIRATION RATE: 20 BRPM | TEMPERATURE: 98.1 F | DIASTOLIC BLOOD PRESSURE: 55 MMHG | HEIGHT: 61 IN | WEIGHT: 245.13 LBS | BODY MASS INDEX: 46.28 KG/M2 | OXYGEN SATURATION: 92 % | HEART RATE: 65 BPM | SYSTOLIC BLOOD PRESSURE: 115 MMHG

## 2019-09-24 LAB
ANION GAP SERPL CALCULATED.3IONS-SCNC: 8 MMOL/L (ref 7–16)
BLOOD CULTURE, ROUTINE: NORMAL
BUN BLDV-MCNC: 31 MG/DL (ref 8–23)
CALCIUM SERPL-MCNC: 9 MG/DL (ref 8.6–10.2)
CHLORIDE BLD-SCNC: 94 MMOL/L (ref 98–107)
CO2: 36 MMOL/L (ref 22–29)
CREAT SERPL-MCNC: 1.2 MG/DL (ref 0.5–1)
CULTURE, BLOOD 2: NORMAL
GFR AFRICAN AMERICAN: 55
GFR NON-AFRICAN AMERICAN: 55 ML/MIN/1.73
GLUCOSE BLD-MCNC: 212 MG/DL (ref 74–99)
HCT VFR BLD CALC: 44.6 % (ref 34–48)
HEMOGLOBIN: 13.9 G/DL (ref 11.5–15.5)
MCH RBC QN AUTO: 31.5 PG (ref 26–35)
MCHC RBC AUTO-ENTMCNC: 31.2 % (ref 32–34.5)
MCV RBC AUTO: 101.1 FL (ref 80–99.9)
METER GLUCOSE: 182 MG/DL (ref 74–99)
METER GLUCOSE: 230 MG/DL (ref 74–99)
PDW BLD-RTO: 12.4 FL (ref 11.5–15)
PLATELET # BLD: 274 E9/L (ref 130–450)
PMV BLD AUTO: 10.6 FL (ref 7–12)
POTASSIUM SERPL-SCNC: 5.4 MMOL/L (ref 3.5–5)
RBC # BLD: 4.41 E12/L (ref 3.5–5.5)
SODIUM BLD-SCNC: 138 MMOL/L (ref 132–146)
WBC # BLD: 8.7 E9/L (ref 4.5–11.5)

## 2019-09-24 PROCEDURE — 6360000002 HC RX W HCPCS: Performed by: PHYSICIAN ASSISTANT

## 2019-09-24 PROCEDURE — 97530 THERAPEUTIC ACTIVITIES: CPT

## 2019-09-24 PROCEDURE — 85027 COMPLETE CBC AUTOMATED: CPT

## 2019-09-24 PROCEDURE — 36415 COLL VENOUS BLD VENIPUNCTURE: CPT

## 2019-09-24 PROCEDURE — 94660 CPAP INITIATION&MGMT: CPT

## 2019-09-24 PROCEDURE — 2580000003 HC RX 258: Performed by: PHYSICIAN ASSISTANT

## 2019-09-24 PROCEDURE — 6370000000 HC RX 637 (ALT 250 FOR IP): Performed by: PHYSICIAN ASSISTANT

## 2019-09-24 PROCEDURE — 94640 AIRWAY INHALATION TREATMENT: CPT

## 2019-09-24 PROCEDURE — 80048 BASIC METABOLIC PNL TOTAL CA: CPT

## 2019-09-24 PROCEDURE — 99239 HOSP IP/OBS DSCHRG MGMT >30: CPT | Performed by: INTERNAL MEDICINE

## 2019-09-24 PROCEDURE — 2700000000 HC OXYGEN THERAPY PER DAY

## 2019-09-24 PROCEDURE — 6360000002 HC RX W HCPCS: Performed by: INTERNAL MEDICINE

## 2019-09-24 PROCEDURE — 97535 SELF CARE MNGMENT TRAINING: CPT

## 2019-09-24 PROCEDURE — APPSS45 APP SPLIT SHARED TIME 31-45 MINUTES: Performed by: NURSE PRACTITIONER

## 2019-09-24 PROCEDURE — 82962 GLUCOSE BLOOD TEST: CPT

## 2019-09-24 PROCEDURE — 6370000000 HC RX 637 (ALT 250 FOR IP): Performed by: NURSE PRACTITIONER

## 2019-09-24 RX ORDER — PREDNISONE 10 MG/1
TABLET ORAL
Qty: 30 TABLET | Refills: 0 | Status: SHIPPED | OUTPATIENT
Start: 2019-09-24 | End: 2019-11-12

## 2019-09-24 RX ORDER — PREDNISONE 20 MG/1
40 TABLET ORAL DAILY
Status: DISCONTINUED | OUTPATIENT
Start: 2019-09-24 | End: 2019-09-24 | Stop reason: SDUPTHER

## 2019-09-24 RX ORDER — MOMETASONE FUROATE AND FORMOTEROL FUMARATE DIHYDRATE 200; 5 UG/1; UG/1
AEROSOL RESPIRATORY (INHALATION)
Qty: 13 INHALER | Refills: 2 | Status: SHIPPED
Start: 2019-09-24 | End: 2020-03-04

## 2019-09-24 RX ORDER — PREDNISONE 1 MG/1
10 TABLET ORAL DAILY
Status: DISCONTINUED | OUTPATIENT
Start: 2019-10-03 | End: 2019-09-24 | Stop reason: HOSPADM

## 2019-09-24 RX ORDER — PREDNISONE 20 MG/1
20 TABLET ORAL DAILY
Status: DISCONTINUED | OUTPATIENT
Start: 2019-09-30 | End: 2019-09-24 | Stop reason: HOSPADM

## 2019-09-24 RX ORDER — PREDNISONE 20 MG/1
40 TABLET ORAL DAILY
Status: DISCONTINUED | OUTPATIENT
Start: 2019-09-24 | End: 2019-09-24 | Stop reason: HOSPADM

## 2019-09-24 RX ADMIN — HYDROCODONE BITARTRATE AND ACETAMINOPHEN 1 TABLET: 7.5; 325 TABLET ORAL at 09:30

## 2019-09-24 RX ADMIN — GABAPENTIN 600 MG: 300 CAPSULE ORAL at 08:37

## 2019-09-24 RX ADMIN — INSULIN LISPRO 6 UNITS: 100 INJECTION, SOLUTION INTRAVENOUS; SUBCUTANEOUS at 08:40

## 2019-09-24 RX ADMIN — HYDRALAZINE HYDROCHLORIDE 100 MG: 50 TABLET, FILM COATED ORAL at 13:23

## 2019-09-24 RX ADMIN — SACUBITRIL AND VALSARTAN 1 TABLET: 24; 26 TABLET, FILM COATED ORAL at 08:36

## 2019-09-24 RX ADMIN — METHYLPREDNISOLONE SODIUM SUCCINATE 40 MG: 40 INJECTION, POWDER, LYOPHILIZED, FOR SOLUTION INTRAMUSCULAR; INTRAVENOUS at 08:37

## 2019-09-24 RX ADMIN — BUMETANIDE 1 MG: 1 TABLET ORAL at 08:37

## 2019-09-24 RX ADMIN — ENOXAPARIN SODIUM 40 MG: 40 INJECTION SUBCUTANEOUS at 08:37

## 2019-09-24 RX ADMIN — Medication 10 ML: at 10:12

## 2019-09-24 RX ADMIN — CETIRIZINE HYDROCHLORIDE 10 MG: 10 TABLET, FILM COATED ORAL at 08:36

## 2019-09-24 RX ADMIN — DULOXETINE HYDROCHLORIDE 60 MG: 60 CAPSULE, DELAYED RELEASE ORAL at 08:36

## 2019-09-24 RX ADMIN — ASPIRIN 81 MG 81 MG: 81 TABLET ORAL at 08:36

## 2019-09-24 RX ADMIN — GABAPENTIN 600 MG: 300 CAPSULE ORAL at 13:23

## 2019-09-24 RX ADMIN — ISOSORBIDE MONONITRATE 60 MG: 60 TABLET, EXTENDED RELEASE ORAL at 08:37

## 2019-09-24 RX ADMIN — FLUTICASONE PROPIONATE 1 SPRAY: 50 SPRAY, METERED NASAL at 08:26

## 2019-09-24 RX ADMIN — Medication: at 08:25

## 2019-09-24 RX ADMIN — IPRATROPIUM BROMIDE AND ALBUTEROL SULFATE 1 AMPULE: 2.5; .5 SOLUTION RESPIRATORY (INHALATION) at 10:10

## 2019-09-24 RX ADMIN — METOPROLOL SUCCINATE 25 MG: 25 TABLET, EXTENDED RELEASE ORAL at 08:36

## 2019-09-24 RX ADMIN — MELOXICAM 7.5 MG: 7.5 TABLET ORAL at 08:36

## 2019-09-24 RX ADMIN — ROSUVASTATIN CALCIUM 5 MG: 5 TABLET, FILM COATED ORAL at 08:36

## 2019-09-24 RX ADMIN — HYDRALAZINE HYDROCHLORIDE 100 MG: 50 TABLET, FILM COATED ORAL at 08:37

## 2019-09-24 RX ADMIN — DOXYCYCLINE 100 MG: 100 CAPSULE ORAL at 08:37

## 2019-09-24 RX ADMIN — CLOPIDOGREL BISULFATE 75 MG: 75 TABLET, FILM COATED ORAL at 08:36

## 2019-09-24 RX ADMIN — FAMOTIDINE 20 MG: 20 TABLET ORAL at 08:36

## 2019-09-24 RX ADMIN — IPRATROPIUM BROMIDE AND ALBUTEROL SULFATE 1 AMPULE: 2.5; .5 SOLUTION RESPIRATORY (INHALATION) at 13:37

## 2019-09-24 RX ADMIN — Medication 10 ML: at 08:37

## 2019-09-24 RX ADMIN — VITAMIN D, TAB 1000IU (100/BT) 1000 UNITS: 25 TAB at 08:36

## 2019-09-24 RX ADMIN — INSULIN LISPRO 3 UNITS: 100 INJECTION, SOLUTION INTRAVENOUS; SUBCUTANEOUS at 11:25

## 2019-09-24 RX ADMIN — TIOTROPIUM BROMIDE 18 MCG: 18 CAPSULE ORAL; RESPIRATORY (INHALATION) at 10:10

## 2019-09-24 RX ADMIN — PANTOPRAZOLE SODIUM 40 MG: 40 TABLET, DELAYED RELEASE ORAL at 08:36

## 2019-09-24 RX ADMIN — MOMETASONE FUROATE AND FORMOTEROL FUMARATE DIHYDRATE 2 PUFF: 200; 5 AEROSOL RESPIRATORY (INHALATION) at 10:10

## 2019-09-24 ASSESSMENT — PAIN SCALES - GENERAL
PAINLEVEL_OUTOF10: 10
PAINLEVEL_OUTOF10: 10
PAINLEVEL_OUTOF10: 0
PAINLEVEL_OUTOF10: 10

## 2019-09-24 NOTE — PROGRESS NOTES
Comments:  Pt remained in chair at end of the session. Education: energy conservation     Other: Pt states she does not have LB ADL adaptive equipment. Good understanding of use demonstrated. · Pt has made  progress towards set goals.    · Continue with current plan of care      Total Tx Time: Laure [address]/L [id number]

## 2019-09-24 NOTE — DISCHARGE SUMMARY
discharged with 5 days of doxy per ID.   4. Hemoptysis: likely due to pneumonia or sinusitis/ viral component: resolving  5. Hyperkalemia: received kayexalate. She is on entresto. She needs BMP in 4 days. 6. Chronic combined systolic and diastolic heart failure: continue home bumex, entresto  7. ALEC vs underlying CKD; creatinine 1.2 at discharge- which appears baseline. BMP in 4 days. 8. DM type 2:  Resume home regimen. Patient to monitor blood sugars closely while on steroids. 9. HLD: continue home statin  10. Deconditioning: PT/OT    Patient continued to improve. She was set up with [facility]. She was then discharged in stable condition with the following medications, instructions, and follow up.        Discharge Exam:  General Appearance: alert and oriented to person, place and time and in no acute distress  Skin: warm and dry  Head: normocephalic and atraumatic  Neck: neck supple and non tender without mass   Pulmonary/Chest: diminished throughout to auscultation   Cardiovascular: normal rate, normal S1 and S2 and no carotid bruits  Abdomen: soft, non-tender, non-distended, normal bowel sounds, no masses or organomegaly  Extremities: no cyanosis, no clubbing and no edema  Neurologic: speech normal     I/O last 3 completed shifts: In: 1120 [P.O.:1120]  Out: 1600 [Urine:1600]  I/O this shift:  In: 240 [P.O.:240]  Out: 1300 [Urine:1300]      LABS:  Recent Labs     19  1048 19  0833 19  1000    136 138   K 5.5* 5.7* 5.4*   CL 94* 95* 94*   CO2 31* 32* 36*   BUN 39* 37* 31*   CREATININE 1.3* 1.4* 1.2*   GLUCOSE 331* 279* 212*   CALCIUM 9.1 9.0 9.0       Recent Labs     19  1115   WBC 8.7   RBC 4.41   HGB 13.9   HCT 44.6   .1*   MCH 31.5   MCHC 31.2*   RDW 12.4      MPV 10.6       No results for input(s): POCGLU in the last 72 hours.     Imaging:  Nm Lung Vent/perfusion (vq)    Result Date: 2019  Patient MRN: [id number] : 1955 Age:  58 years Gender: Female Order 31G X 5/16\" 1 ML Misc  1 each by Does not apply route daily Use as directed     isosorbide mononitrate 60 MG extended release tablet  Commonly known as:  IMDUR  Take 1 tablet by mouth daily     lidocaine 5 % ointment  Commonly known as:  XYLOCAINE  APPLY TOPICALLY AS NEEDED FOR PAIN     meloxicam 7.5 MG tablet  Commonly known as:  MOBIC  Take 1 tablet by mouth daily     metoprolol succinate 25 MG extended release tablet  Commonly known as:  TOPROL XL  Take 1 tablet by mouth 2 times daily     mirabegron 50 MG Tb24  Commonly known as:  MYRBETRIQ  Take 50 mg by mouth daily     montelukast 10 MG tablet  Commonly known as:  SINGULAIR  Take 1 tablet by mouth nightly     nitroGLYCERIN 0.4 MG SL tablet  Commonly known as:  NITROSTAT  up to max of 3 total doses. If no relief after 1 dose, call 911.      olopatadine 0.2 % Soln ophthalmic solution  Commonly known as:  PATADAY     pantoprazole 40 MG tablet  Commonly known as:  PROTONIX  TAKE 1 TABLET BY MOUTH TWICE A DAY     ranitidine 150 MG tablet  Commonly known as:  ZANTAC  Take 1 tablet by mouth 2 times daily     rosuvastatin 5 MG tablet  Commonly known as:  CRESTOR  TAKE 1 TABLET BY MOUTH EVERY DAY     sacubitril-valsartan 24-26 MG per tablet  Commonly known as:  ENTRESTO  Take 1 tablet by mouth 2 times daily     SPIRIVA RESPIMAT 1.25 MCG/ACT Aers inhaler  Generic drug:  tiotropium  INHALE TWO (2) PUFFS BY MOUTH EVERY DAY     therapeutic multivitamin-minerals tablet     traZODone 100 MG tablet  Commonly known as:  DESYREL  TAKE 1 TABLET BY MOUTH AT NIGHT     vitamin D3 1000 units Tabs  Take 1 tablet by mouth daily        STOP taking these medications    [employer] ARTHRITIS PAIN RELIEF 650 MG extended release tablet  Generic drug:  acetaminophen     dextromethorphan-guaiFENesin  MG/5ML syrup  Commonly known as:  ROBITUSSIN-DM     diphenoxylate-atropine 2.5-0.025 MG per tablet  Commonly known as:  LOMOTIL           Where to Get Your Medications      These medications were

## 2019-09-24 NOTE — PROGRESS NOTES
09/24/19 0836    clopidogrel (PLAVIX) tablet 75 mg  75 mg Oral Daily Dulce Stafford PA-C   75 mg at 09/24/19 0836    DULoxetine (CYMBALTA) extended release capsule 60 mg  60 mg Oral BID Dulce Stafford PA-C   60 mg at 09/24/19 0836    gabapentin (NEURONTIN) capsule 600 mg  600 mg Oral TID Dulce Stafford PA-C   600 mg at 09/24/19 0837    isosorbide mononitrate (IMDUR) extended release tablet 60 mg  60 mg Oral Daily Dulce Stafford PA-C   60 mg at 09/24/19 0837    meloxicam (MOBIC) tablet 7.5 mg  7.5 mg Oral Daily Dulce Stafford PA-C   7.5 mg at 09/24/19 0836    metoprolol succinate (TOPROL XL) extended release tablet 25 mg  25 mg Oral BID Dulce Stafford PA-C   25 mg at 09/24/19 0836    mometasone-formoterol (DULERA) 200-5 MCG/ACT inhaler 2 puff  2 puff Inhalation Q12H Dulce Stafford PA-C   2 puff at 09/24/19 1010    montelukast (SINGULAIR) tablet 10 mg  10 mg Oral Nightly Dulce Stafford PA-C   10 mg at 09/23/19 2215    pantoprazole (PROTONIX) tablet 40 mg  40 mg Oral BID Dulce Stafford PA-C   40 mg at 09/24/19 0836    rosuvastatin (CRESTOR) tablet 5 mg  5 mg Oral Daily Dulce Stafford PA-C   5 mg at 09/24/19 0836    tiotropium (SPIRIVA) inhalation capsule 18 mcg  18 mcg Inhalation Daily Dulce Stafford PA-C   18 mcg at 09/24/19 1010    sacubitril-valsartan (ENTRESTO) 24-26 MG per tablet 1 tablet  1 tablet Oral BID Dulce Stafford PA-C   1 tablet at 09/24/19 0836    famotidine (PEPCID) tablet 20 mg  20 mg Oral BID Dulce Stafford PA-C   20 mg at 09/24/19 0836    fluticasone (FLONASE) 50 MCG/ACT nasal spray 1 spray  1 spray Nasal BID Dulce Stafford PA-C   1 spray at 09/24/19 0826    ipratropium-albuterol (DUONEB) nebulizer solution 1 ampule  1 ampule Inhalation Q4H WA Dulce Stafford PA-C   1 ampule at 09/24/19 1010    hydrALAZINE (APRESOLINE) tablet 100 mg  100 mg Oral TID Dulce Stafford PA-C   100 mg at 09/24/19 0837    sodium chloride flush 0.9 % injection 10 mL  10 mL Intravenous 2 times 3.5 09/20/2019    MONOPCT 0.0 09/20/2019    BASOPCT 0.0 09/20/2019    MONOSABS 0.00 09/20/2019    LYMPHSABS 0.44 09/20/2019    EOSABS 0.00 09/20/2019    BASOSABS 0.00 09/20/2019     CMP:    Lab Results   Component Value Date     09/24/2019    K 5.4 09/24/2019    K 5.4 09/20/2019    CL 94 09/24/2019    CO2 36 09/24/2019    BUN 31 09/24/2019    CREATININE 1.2 09/24/2019    GFRAA 55 09/24/2019    LABGLOM 55 09/24/2019    GLUCOSE 212 09/24/2019    GLUCOSE 181 12/01/2011    PROT 7.4 09/20/2019    LABALBU 3.8 09/20/2019    LABALBU 5.2 12/01/2011    CALCIUM 9.0 09/24/2019    BILITOT 0.4 09/20/2019    ALKPHOS 73 09/20/2019    AST 14 09/20/2019    ALT 21 09/20/2019     ABG:    Lab Results   Component Value Date    PH 7.400 09/19/2019    PCO2 44.6 09/19/2019    PO2 65.6 09/19/2019    HCO3 27.0 09/19/2019    BE 1.8 09/19/2019    O2SAT 92.7 09/19/2019         CXR reviewed:   NM LUNG VENT/PERFUSION (VQ)   Final Result   There is a low probability of pulmonary thromboembolic   disease. XR CHEST PORTABLE   Final Result      Cardiomegaly with with median sternotomy      There is ill-defined airspace consolidation seen throughout both lung   more pronounced on the right lower suspicion for pneumonic process. Impression:  1. Pneumonia  2. Hemoptysis likely due to pneumonia/sinusitis  3. Throat pain and right sided tender adenopathy - possible viral component or sinusitis component  4. COPD/Asthma  5. Obesity hypoventilation / DAYAN - on AVAPs/NIV    Principal Problem:    COPD (chronic obstructive pulmonary disease) (HCC)  Active Problems:    Chronic combined systolic and diastolic heart failure (HCC)    DAYAN and COPD overlap syndrome (HCC)    Morbid obesity due to excess calories ([address]    Pneumonia due to organism    Acute respiratory failure with hypoxia ([address]    Controlled type 2 diabetes mellitus without complication ([address]  Resolved Problems:    * No resolved hospital problems. *      Plans:   1.  Complete

## 2019-09-25 ENCOUNTER — CARE COORDINATION (OUTPATIENT)
Dept: CARE COORDINATION | Age: 64
End: 2019-09-25

## 2019-09-25 RX ORDER — ISOSORBIDE MONONITRATE 60 MG/1
TABLET, EXTENDED RELEASE ORAL
Qty: 90 TABLET | Refills: 3 | Status: SHIPPED
Start: 2019-09-25 | End: 2020-10-13 | Stop reason: SDUPTHER

## 2019-09-25 RX ORDER — MONTELUKAST SODIUM 10 MG/1
10 TABLET ORAL NIGHTLY
Qty: 90 TABLET | Refills: 10 | Status: SHIPPED
Start: 2019-09-25 | End: 2020-10-07

## 2019-09-25 RX ORDER — DULOXETIN HYDROCHLORIDE 60 MG/1
CAPSULE, DELAYED RELEASE ORAL
Qty: 180 CAPSULE | Refills: 10 | Status: SHIPPED
Start: 2019-09-25 | End: 2020-10-07

## 2019-09-25 RX ORDER — SIMVASTATIN 20 MG
TABLET ORAL
Qty: 90 TABLET | Refills: 10 | Status: SHIPPED
Start: 2019-09-25 | End: 2020-10-06

## 2019-09-25 RX ORDER — SACUBITRIL AND VALSARTAN 24; 26 MG/1; MG/1
TABLET, FILM COATED ORAL
Qty: 180 TABLET | Refills: 3 | Status: SHIPPED
Start: 2019-09-25 | End: 2020-09-09

## 2019-09-27 ENCOUNTER — TELEPHONE (OUTPATIENT)
Dept: FAMILY MEDICINE CLINIC | Age: 64
End: 2019-09-27

## 2019-09-27 NOTE — TELEPHONE ENCOUNTER
Pt states she was discharged for the hospital on prednisone and now her sugars are elevated. Last night 322 and today 297. Pt needs to know how to adjust her insulin.

## 2019-09-30 ENCOUNTER — HOSPITAL ENCOUNTER (OUTPATIENT)
Dept: INTERVENTIONAL RADIOLOGY/VASCULAR | Age: 64
Discharge: HOME | End: 2019-10-02
Payer: MEDICARE

## 2019-09-30 ENCOUNTER — TELEPHONE (OUTPATIENT)
Dept: FAMILY MEDICINE CLINIC | Age: 64
End: 2019-09-30

## 2019-09-30 DIAGNOSIS — J96.11 CHRONIC RESPIRATORY FAILURE WITH HYPOXIA AND HYPERCAPNIA (HCC): Primary | ICD-10-CM

## 2019-09-30 DIAGNOSIS — I70.222 ATHEROSCLEROSIS OF NATIVE ARTERIES OF EXTREMITIES WITH REST PAIN, LEFT LEG (HCC): ICD-10-CM

## 2019-09-30 DIAGNOSIS — J96.12 CHRONIC RESPIRATORY FAILURE WITH HYPOXIA AND HYPERCAPNIA (HCC): Primary | ICD-10-CM

## 2019-10-01 ENCOUNTER — TELEPHONE (OUTPATIENT)
Dept: FAMILY MEDICINE CLINIC | Age: 64
End: 2019-10-01

## 2019-10-02 RX ORDER — PEN NEEDLE, DIABETIC 31 GX5/16"
NEEDLE, DISPOSABLE MISCELLANEOUS
Qty: 100 EACH | Refills: 3 | Status: SHIPPED
Start: 2019-10-02 | End: 2020-10-13

## 2019-10-02 RX ORDER — BACLOFEN 10 MG/1
TABLET ORAL
Qty: 30 TABLET | Refills: 0 | Status: SHIPPED | OUTPATIENT
Start: 2019-10-02 | End: 2019-10-04 | Stop reason: SDUPTHER

## 2019-10-03 ENCOUNTER — TELEPHONE (OUTPATIENT)
Dept: FAMILY MEDICINE CLINIC | Age: 64
End: 2019-10-03

## 2019-10-04 ENCOUNTER — OFFICE VISIT (OUTPATIENT)
Dept: FAMILY MEDICINE CLINIC | Age: 64
End: 2019-10-04
Payer: MEDICARE

## 2019-10-04 VITALS
HEART RATE: 78 BPM | SYSTOLIC BLOOD PRESSURE: 146 MMHG | RESPIRATION RATE: 16 BRPM | DIASTOLIC BLOOD PRESSURE: 90 MMHG | OXYGEN SATURATION: 96 % | WEIGHT: 264 LBS | BODY MASS INDEX: 49.88 KG/M2

## 2019-10-04 DIAGNOSIS — J96.12 CHRONIC RESPIRATORY FAILURE WITH HYPOXIA AND HYPERCAPNIA (HCC): Primary | ICD-10-CM

## 2019-10-04 DIAGNOSIS — J96.11 CHRONIC RESPIRATORY FAILURE WITH HYPOXIA AND HYPERCAPNIA (HCC): Primary | ICD-10-CM

## 2019-10-04 DIAGNOSIS — G47.33 OSA AND COPD OVERLAP SYNDROME (HCC): ICD-10-CM

## 2019-10-04 DIAGNOSIS — E11.51 TYPE 2 DIABETES MELLITUS WITH DIABETIC PERIPHERAL ANGIOPATHY WITHOUT GANGRENE, WITH LONG-TERM CURRENT USE OF INSULIN (HCC): ICD-10-CM

## 2019-10-04 DIAGNOSIS — Z79.4 TYPE 2 DIABETES MELLITUS WITH DIABETIC PERIPHERAL ANGIOPATHY WITHOUT GANGRENE, WITH LONG-TERM CURRENT USE OF INSULIN (HCC): ICD-10-CM

## 2019-10-04 DIAGNOSIS — J42 CHRONIC BRONCHITIS, UNSPECIFIED CHRONIC BRONCHITIS TYPE (HCC): ICD-10-CM

## 2019-10-04 DIAGNOSIS — J44.9 OSA AND COPD OVERLAP SYNDROME (HCC): ICD-10-CM

## 2019-10-04 DIAGNOSIS — E66.01 MORBID OBESITY DUE TO EXCESS CALORIES (HCC): ICD-10-CM

## 2019-10-04 PROCEDURE — G8484 FLU IMMUNIZE NO ADMIN: HCPCS | Performed by: FAMILY MEDICINE

## 2019-10-04 PROCEDURE — G8427 DOCREV CUR MEDS BY ELIG CLIN: HCPCS | Performed by: FAMILY MEDICINE

## 2019-10-04 PROCEDURE — G8926 SPIRO NO PERF OR DOC: HCPCS | Performed by: FAMILY MEDICINE

## 2019-10-04 PROCEDURE — 99214 OFFICE O/P EST MOD 30 MIN: CPT | Performed by: FAMILY MEDICINE

## 2019-10-04 PROCEDURE — 3044F HG A1C LEVEL LT 7.0%: CPT | Performed by: FAMILY MEDICINE

## 2019-10-04 PROCEDURE — 2022F DILAT RTA XM EVC RTNOPTHY: CPT | Performed by: FAMILY MEDICINE

## 2019-10-04 PROCEDURE — G8598 ASA/ANTIPLAT THER USED: HCPCS | Performed by: FAMILY MEDICINE

## 2019-10-04 PROCEDURE — 4004F PT TOBACCO SCREEN RCVD TLK: CPT | Performed by: FAMILY MEDICINE

## 2019-10-04 PROCEDURE — 3017F COLORECTAL CA SCREEN DOC REV: CPT | Performed by: FAMILY MEDICINE

## 2019-10-04 PROCEDURE — G8417 CALC BMI ABV UP PARAM F/U: HCPCS | Performed by: FAMILY MEDICINE

## 2019-10-04 PROCEDURE — 3023F SPIROM DOC REV: CPT | Performed by: FAMILY MEDICINE

## 2019-10-04 PROCEDURE — 1111F DSCHRG MED/CURRENT MED MERGE: CPT | Performed by: FAMILY MEDICINE

## 2019-10-04 RX ORDER — BACLOFEN 10 MG/1
TABLET ORAL
Qty: 180 TABLET | Refills: 1 | Status: SHIPPED
Start: 2019-10-04 | End: 2020-04-23

## 2019-10-04 RX ORDER — FLUCONAZOLE 150 MG/1
150 TABLET ORAL ONCE
Qty: 2 TABLET | Refills: 0 | Status: SHIPPED | OUTPATIENT
Start: 2019-10-04 | End: 2019-10-04

## 2019-10-04 RX ORDER — MELATONIN
1000 DAILY
Qty: 90 TABLET | Refills: 1 | Status: SHIPPED
Start: 2019-10-04 | End: 2020-07-17 | Stop reason: SDUPTHER

## 2019-10-04 RX ORDER — ASPIRIN 81 MG/1
TABLET, CHEWABLE ORAL
Qty: 90 TABLET | Refills: 1 | Status: SHIPPED
Start: 2019-10-04 | End: 2020-02-05

## 2019-10-04 RX ORDER — FLUTICASONE PROPIONATE 50 MCG
1 SPRAY, SUSPENSION (ML) NASAL 2 TIMES DAILY
Qty: 3 BOTTLE | Refills: 1 | Status: SHIPPED
Start: 2019-10-04 | End: 2020-07-17 | Stop reason: SDUPTHER

## 2019-10-07 ASSESSMENT — ENCOUNTER SYMPTOMS
CONSTIPATION: 0
BACK PAIN: 0
NAUSEA: 0
SORE THROAT: 0
ABDOMINAL PAIN: 1
SINUS PAIN: 0
DIARRHEA: 0
SHORTNESS OF BREATH: 0
RHINORRHEA: 0
VOMITING: 0
EYE PAIN: 0
COUGH: 1
SINUS PRESSURE: 0
TROUBLE SWALLOWING: 0

## 2019-10-09 ENCOUNTER — TELEPHONE (OUTPATIENT)
Dept: FAMILY MEDICINE CLINIC | Age: 64
End: 2019-10-09

## 2019-10-09 ENCOUNTER — HOSPITAL ENCOUNTER (OUTPATIENT)
Dept: CT IMAGING | Age: 64
Discharge: HOME | End: 2019-10-11
Payer: MEDICARE

## 2019-10-09 DIAGNOSIS — J02.9 SORE THROAT: Primary | ICD-10-CM

## 2019-10-09 DIAGNOSIS — R10.84 GENERALIZED ABDOMINAL PAIN: ICD-10-CM

## 2019-10-09 PROCEDURE — 6360000004 HC RX CONTRAST MEDICATION: Performed by: RADIOLOGY

## 2019-10-09 PROCEDURE — 74176 CT ABD & PELVIS W/O CONTRAST: CPT

## 2019-10-09 RX ORDER — BUMETANIDE 1 MG/1
1 TABLET ORAL DAILY
Qty: 90 TABLET | Refills: 3 | Status: SHIPPED
Start: 2019-10-09 | End: 2020-10-13

## 2019-10-09 RX ORDER — TRAZODONE HYDROCHLORIDE 100 MG/1
TABLET ORAL
Qty: 90 TABLET | Refills: 3 | Status: SHIPPED
Start: 2019-10-09 | End: 2020-07-17

## 2019-10-09 RX ADMIN — IOHEXOL 50 ML: 240 INJECTION, SOLUTION INTRATHECAL; INTRAVASCULAR; INTRAVENOUS; ORAL at 12:20

## 2019-10-10 ENCOUNTER — TELEPHONE (OUTPATIENT)
Dept: FAMILY MEDICINE CLINIC | Age: 64
End: 2019-10-10

## 2019-10-10 DIAGNOSIS — J96.12 CHRONIC RESPIRATORY FAILURE WITH HYPOXIA AND HYPERCAPNIA (HCC): Primary | ICD-10-CM

## 2019-10-10 DIAGNOSIS — E66.01 MORBID OBESITY DUE TO EXCESS CALORIES (HCC): ICD-10-CM

## 2019-10-10 DIAGNOSIS — J42 CHRONIC BRONCHITIS, UNSPECIFIED CHRONIC BRONCHITIS TYPE (HCC): ICD-10-CM

## 2019-10-10 DIAGNOSIS — J96.11 CHRONIC RESPIRATORY FAILURE WITH HYPOXIA AND HYPERCAPNIA (HCC): Primary | ICD-10-CM

## 2019-10-10 RX ORDER — BENZONATATE 100 MG/1
100 CAPSULE ORAL 3 TIMES DAILY PRN
Qty: 30 CAPSULE | Refills: 0 | Status: SHIPPED | OUTPATIENT
Start: 2019-10-10 | End: 2019-10-17

## 2019-10-15 ENCOUNTER — TELEPHONE (OUTPATIENT)
Dept: ENT CLINIC | Age: 64
End: 2019-10-15

## 2019-10-15 ENCOUNTER — TELEPHONE (OUTPATIENT)
Dept: FAMILY MEDICINE CLINIC | Age: 64
End: 2019-10-15

## 2019-10-15 DIAGNOSIS — J02.9 SORE THROAT: Primary | ICD-10-CM

## 2019-10-16 RX ORDER — RANITIDINE 150 MG/1
TABLET ORAL
Qty: 60 TABLET | Refills: 3 | Status: SHIPPED
Start: 2019-10-16 | End: 2020-03-05

## 2019-10-17 ENCOUNTER — TELEPHONE (OUTPATIENT)
Dept: ADMINISTRATIVE | Age: 64
End: 2019-10-17

## 2019-10-18 RX ORDER — GABAPENTIN 300 MG/1
CAPSULE ORAL
Qty: 180 CAPSULE | Refills: 10 | OUTPATIENT
Start: 2019-10-18

## 2019-10-25 ENCOUNTER — TELEPHONE (OUTPATIENT)
Dept: FAMILY MEDICINE CLINIC | Age: 64
End: 2019-10-25

## 2019-10-25 ENCOUNTER — TELEPHONE (OUTPATIENT)
Dept: PHYSICAL MEDICINE AND REHAB | Age: 64
End: 2019-10-25

## 2019-10-29 ENCOUNTER — HOSPITAL ENCOUNTER (OUTPATIENT)
Dept: GENERAL RADIOLOGY | Age: 64
Discharge: HOME | End: 2019-10-31
Payer: MEDICARE

## 2019-10-29 ENCOUNTER — HOSPITAL ENCOUNTER (OUTPATIENT)
Age: 64
Discharge: HOME | End: 2019-10-31
Payer: MEDICARE

## 2019-10-29 ENCOUNTER — HOSPITAL ENCOUNTER (OUTPATIENT)
Age: 64
Discharge: HOME | End: 2019-10-29
Payer: MEDICARE

## 2019-10-29 ENCOUNTER — HOSPITAL ENCOUNTER (OUTPATIENT)
Dept: INTERVENTIONAL RADIOLOGY/VASCULAR | Age: 64
Discharge: HOME | End: 2019-10-31
Payer: MEDICARE

## 2019-10-29 DIAGNOSIS — J15.9 COMMUNITY ACQUIRED BACTERIAL PNEUMONIA: ICD-10-CM

## 2019-10-29 LAB
ALBUMIN SERPL-MCNC: 3.7 G/DL (ref 3.5–5.2)
ALP BLD-CCNC: 75 U/L (ref 35–104)
ALT SERPL-CCNC: 14 U/L (ref 0–32)
ANION GAP SERPL CALCULATED.3IONS-SCNC: 10 MMOL/L (ref 7–16)
AST SERPL-CCNC: 12 U/L (ref 0–31)
BASOPHILS ABSOLUTE: 0.02 E9/L (ref 0–0.2)
BASOPHILS RELATIVE PERCENT: 0.2 % (ref 0–2)
BILIRUB SERPL-MCNC: 0.3 MG/DL (ref 0–1.2)
BUN BLDV-MCNC: 15 MG/DL (ref 8–23)
CALCIUM SERPL-MCNC: 9.1 MG/DL (ref 8.6–10.2)
CHLORIDE BLD-SCNC: 101 MMOL/L (ref 98–107)
CO2: 27 MMOL/L (ref 22–29)
CREAT SERPL-MCNC: 1 MG/DL (ref 0.5–1)
EOSINOPHILS ABSOLUTE: 0.06 E9/L (ref 0.05–0.5)
EOSINOPHILS RELATIVE PERCENT: 0.7 % (ref 0–6)
GFR AFRICAN AMERICAN: >60
GFR NON-AFRICAN AMERICAN: >60 ML/MIN/1.73
GLUCOSE BLD-MCNC: 155 MG/DL (ref 74–99)
HCT VFR BLD CALC: 41.5 % (ref 34–48)
HEMOGLOBIN: 12.8 G/DL (ref 11.5–15.5)
IMMATURE GRANULOCYTES #: 0.03 E9/L
IMMATURE GRANULOCYTES %: 0.4 % (ref 0–5)
LYMPHOCYTES ABSOLUTE: 1.32 E9/L (ref 1.5–4)
LYMPHOCYTES RELATIVE PERCENT: 16.2 % (ref 20–42)
MCH RBC QN AUTO: 31.5 PG (ref 26–35)
MCHC RBC AUTO-ENTMCNC: 30.8 % (ref 32–34.5)
MCV RBC AUTO: 102.2 FL (ref 80–99.9)
MONOCYTES ABSOLUTE: 0.45 E9/L (ref 0.1–0.95)
MONOCYTES RELATIVE PERCENT: 5.5 % (ref 2–12)
NEUTROPHILS ABSOLUTE: 6.29 E9/L (ref 1.8–7.3)
NEUTROPHILS RELATIVE PERCENT: 77 % (ref 43–80)
PDW BLD-RTO: 14.1 FL (ref 11.5–15)
PLATELET # BLD: 261 E9/L (ref 130–450)
PMV BLD AUTO: 11.2 FL (ref 7–12)
POTASSIUM SERPL-SCNC: 4.5 MMOL/L (ref 3.5–5)
RBC # BLD: 4.06 E12/L (ref 3.5–5.5)
SODIUM BLD-SCNC: 138 MMOL/L (ref 132–146)
TOTAL PROTEIN: 6.4 G/DL (ref 6.4–8.3)
WBC # BLD: 8.2 E9/L (ref 4.5–11.5)

## 2019-10-29 PROCEDURE — 87449 NOS EACH ORGANISM AG IA: CPT

## 2019-10-29 PROCEDURE — 71046 X-RAY EXAM CHEST 2 VIEWS: CPT

## 2019-10-29 PROCEDURE — 85025 COMPLETE CBC W/AUTO DIFF WBC: CPT

## 2019-10-29 PROCEDURE — 36415 COLL VENOUS BLD VENIPUNCTURE: CPT

## 2019-10-29 PROCEDURE — 87324 CLOSTRIDIUM AG IA: CPT

## 2019-10-29 PROCEDURE — 93923 UPR/LXTR ART STDY 3+ LVLS: CPT

## 2019-10-29 PROCEDURE — 80053 COMPREHEN METABOLIC PANEL: CPT

## 2019-10-30 LAB — C DIFF TOXIN/ANTIGEN: NORMAL

## 2019-10-31 ENCOUNTER — TELEPHONE (OUTPATIENT)
Dept: FAMILY MEDICINE CLINIC | Age: 64
End: 2019-10-31

## 2019-10-31 ENCOUNTER — OFFICE VISIT (OUTPATIENT)
Dept: VASCULAR SURGERY | Age: 64
End: 2019-10-31
Payer: MEDICARE

## 2019-10-31 DIAGNOSIS — Z98.62 STATUS POST PERIPHERAL ARTERY ANGIOPLASTY: ICD-10-CM

## 2019-10-31 DIAGNOSIS — I73.9 PVD (PERIPHERAL VASCULAR DISEASE) WITH CLAUDICATION (HCC): Primary | ICD-10-CM

## 2019-10-31 PROBLEM — I70.222 ATHEROSCLEROSIS OF NATIVE ARTERIES OF EXTREMITIES WITH REST PAIN, LEFT LEG (HCC): Chronic | Status: RESOLVED | Noted: 2018-11-09 | Resolved: 2019-10-31

## 2019-10-31 PROCEDURE — 3017F COLORECTAL CA SCREEN DOC REV: CPT | Performed by: SURGERY

## 2019-10-31 PROCEDURE — 99214 OFFICE O/P EST MOD 30 MIN: CPT | Performed by: SURGERY

## 2019-10-31 PROCEDURE — G8484 FLU IMMUNIZE NO ADMIN: HCPCS | Performed by: SURGERY

## 2019-10-31 PROCEDURE — G8417 CALC BMI ABV UP PARAM F/U: HCPCS | Performed by: SURGERY

## 2019-10-31 PROCEDURE — G8598 ASA/ANTIPLAT THER USED: HCPCS | Performed by: SURGERY

## 2019-10-31 PROCEDURE — G8427 DOCREV CUR MEDS BY ELIG CLIN: HCPCS | Performed by: SURGERY

## 2019-10-31 PROCEDURE — 4004F PT TOBACCO SCREEN RCVD TLK: CPT | Performed by: SURGERY

## 2019-10-31 RX ORDER — CILOSTAZOL 100 MG/1
100 TABLET ORAL 2 TIMES DAILY
Qty: 60 TABLET | Refills: 11 | Status: SHIPPED | OUTPATIENT
Start: 2019-10-31 | End: 2019-10-31

## 2019-10-31 RX ORDER — BENZONATATE 100 MG/1
100 CAPSULE ORAL 3 TIMES DAILY PRN
Qty: 30 CAPSULE | Refills: 0 | Status: SHIPPED
Start: 2019-10-31 | End: 2020-04-14 | Stop reason: SDUPTHER

## 2019-11-01 RX ORDER — ISOSORBIDE MONONITRATE 60 MG/1
60 TABLET, EXTENDED RELEASE ORAL DAILY
Qty: 90 TABLET | Refills: 3 | Status: SHIPPED | OUTPATIENT
Start: 2019-11-01 | End: 2019-11-12 | Stop reason: SDUPTHER

## 2019-11-01 RX ORDER — METOPROLOL SUCCINATE 25 MG/1
25 TABLET, EXTENDED RELEASE ORAL 2 TIMES DAILY
Qty: 180 TABLET | Refills: 3 | Status: SHIPPED | OUTPATIENT
Start: 2019-11-01 | End: 2019-11-12 | Stop reason: SDUPTHER

## 2019-11-08 ENCOUNTER — TELEPHONE (OUTPATIENT)
Dept: FAMILY MEDICINE CLINIC | Age: 64
End: 2019-11-08

## 2019-11-08 ENCOUNTER — TELEPHONE (OUTPATIENT)
Dept: ADMINISTRATIVE | Age: 64
End: 2019-11-08

## 2019-11-12 ENCOUNTER — OFFICE VISIT (OUTPATIENT)
Dept: CARDIOLOGY CLINIC | Age: 64
End: 2019-11-12
Payer: MEDICARE

## 2019-11-12 VITALS
RESPIRATION RATE: 18 BRPM | DIASTOLIC BLOOD PRESSURE: 66 MMHG | SYSTOLIC BLOOD PRESSURE: 118 MMHG | WEIGHT: 241.9 LBS | BODY MASS INDEX: 44.51 KG/M2 | HEART RATE: 87 BPM | HEIGHT: 62 IN

## 2019-11-12 DIAGNOSIS — I50.42 CHRONIC COMBINED SYSTOLIC AND DIASTOLIC HEART FAILURE (HCC): Primary | ICD-10-CM

## 2019-11-12 DIAGNOSIS — I50.22 CHRONIC SYSTOLIC HEART FAILURE (HCC): ICD-10-CM

## 2019-11-12 DIAGNOSIS — I25.10 CORONARY ARTERY DISEASE INVOLVING NATIVE CORONARY ARTERY OF NATIVE HEART WITHOUT ANGINA PECTORIS: Primary | Chronic | ICD-10-CM

## 2019-11-12 DIAGNOSIS — I73.9 PAD (PERIPHERAL ARTERY DISEASE) (HCC): ICD-10-CM

## 2019-11-12 DIAGNOSIS — Z95.1 HX OF CABG: ICD-10-CM

## 2019-11-12 DIAGNOSIS — I10 ESSENTIAL HYPERTENSION: ICD-10-CM

## 2019-11-12 DIAGNOSIS — G47.33 OSA AND COPD OVERLAP SYNDROME (HCC): Chronic | ICD-10-CM

## 2019-11-12 DIAGNOSIS — I45.10 RBBB: ICD-10-CM

## 2019-11-12 DIAGNOSIS — J44.9 OSA AND COPD OVERLAP SYNDROME (HCC): Chronic | ICD-10-CM

## 2019-11-12 DIAGNOSIS — J96.12 CHRONIC RESPIRATORY FAILURE WITH HYPOXIA AND HYPERCAPNIA (HCC): Chronic | ICD-10-CM

## 2019-11-12 DIAGNOSIS — M47.816 SPONDYLOSIS OF LUMBAR REGION WITHOUT MYELOPATHY OR RADICULOPATHY: ICD-10-CM

## 2019-11-12 DIAGNOSIS — J96.11 CHRONIC RESPIRATORY FAILURE WITH HYPOXIA AND HYPERCAPNIA (HCC): Chronic | ICD-10-CM

## 2019-11-12 PROCEDURE — 4004F PT TOBACCO SCREEN RCVD TLK: CPT | Performed by: PHYSICIAN ASSISTANT

## 2019-11-12 PROCEDURE — 93000 ELECTROCARDIOGRAM COMPLETE: CPT | Performed by: INTERNAL MEDICINE

## 2019-11-12 PROCEDURE — G8484 FLU IMMUNIZE NO ADMIN: HCPCS | Performed by: PHYSICIAN ASSISTANT

## 2019-11-12 PROCEDURE — G8427 DOCREV CUR MEDS BY ELIG CLIN: HCPCS | Performed by: PHYSICIAN ASSISTANT

## 2019-11-12 PROCEDURE — 3017F COLORECTAL CA SCREEN DOC REV: CPT | Performed by: PHYSICIAN ASSISTANT

## 2019-11-12 PROCEDURE — G8417 CALC BMI ABV UP PARAM F/U: HCPCS | Performed by: PHYSICIAN ASSISTANT

## 2019-11-12 PROCEDURE — 99214 OFFICE O/P EST MOD 30 MIN: CPT | Performed by: PHYSICIAN ASSISTANT

## 2019-11-12 PROCEDURE — G8598 ASA/ANTIPLAT THER USED: HCPCS | Performed by: PHYSICIAN ASSISTANT

## 2019-11-12 RX ORDER — OXYBUTYNIN CHLORIDE 10 MG/1
10 TABLET, EXTENDED RELEASE ORAL DAILY
COMMUNITY
End: 2020-01-03

## 2019-11-12 RX ORDER — LOPERAMIDE HYDROCHLORIDE 2 MG/1
2 CAPSULE ORAL 4 TIMES DAILY PRN
Status: ON HOLD | COMMUNITY
End: 2021-04-24 | Stop reason: HOSPADM

## 2019-11-12 RX ORDER — MELOXICAM 7.5 MG/1
7.5 TABLET ORAL DAILY
Qty: 90 TABLET | Refills: 0 | OUTPATIENT
Start: 2019-11-12

## 2019-11-12 RX ORDER — METOPROLOL SUCCINATE 25 MG/1
25 TABLET, EXTENDED RELEASE ORAL 2 TIMES DAILY
Qty: 180 TABLET | Refills: 3 | Status: SHIPPED
Start: 2019-11-12 | End: 2020-11-24 | Stop reason: SDUPTHER

## 2019-11-12 RX ORDER — HYDROCODONE BITARTRATE AND ACETAMINOPHEN 7.5; 325 MG/1; MG/1
1 TABLET ORAL EVERY 6 HOURS PRN
Status: ON HOLD | COMMUNITY
End: 2021-03-13 | Stop reason: HOSPADM

## 2019-11-12 RX ORDER — KETOROLAC TROMETHAMINE 5 MG/ML
1 SOLUTION OPHTHALMIC 4 TIMES DAILY
COMMUNITY
End: 2021-05-05 | Stop reason: ALTCHOICE

## 2019-11-14 RX ORDER — CETIRIZINE HYDROCHLORIDE 10 MG/1
TABLET ORAL
Qty: 90 TABLET | Refills: 1 | Status: SHIPPED
Start: 2019-11-14 | End: 2020-05-05

## 2019-11-14 RX ORDER — MELOXICAM 7.5 MG/1
TABLET ORAL
Qty: 90 TABLET | Refills: 0 | Status: SHIPPED
Start: 2019-11-14 | End: 2020-07-17 | Stop reason: SDUPTHER

## 2019-11-19 RX ORDER — INSULIN LISPRO 100 [IU]/ML
INJECTION, SOLUTION INTRAVENOUS; SUBCUTANEOUS
Qty: 45 PEN | Refills: 1 | Status: SHIPPED | OUTPATIENT
Start: 2019-11-19 | End: 2019-12-13 | Stop reason: SDUPTHER

## 2019-11-27 RX ORDER — BLOOD SUGAR DIAGNOSTIC, DRUM
STRIP MISCELLANEOUS
Qty: 102 STRIP | Refills: 3 | Status: SHIPPED | OUTPATIENT
Start: 2019-11-27 | End: 2020-01-21 | Stop reason: SDUPTHER

## 2019-12-02 ENCOUNTER — TELEPHONE (OUTPATIENT)
Dept: ADMINISTRATIVE | Age: 64
End: 2019-12-02

## 2019-12-10 RX ORDER — INSULIN LISPRO 100 [IU]/ML
INJECTION, SOLUTION INTRAVENOUS; SUBCUTANEOUS
Qty: 3 PEN | Refills: 1 | Status: SHIPPED | OUTPATIENT
Start: 2019-12-10 | End: 2020-01-21 | Stop reason: SDUPTHER

## 2019-12-12 ENCOUNTER — TELEPHONE (OUTPATIENT)
Dept: FAMILY MEDICINE CLINIC | Age: 64
End: 2019-12-12

## 2019-12-13 RX ORDER — INSULIN LISPRO 100 [IU]/ML
INJECTION, SOLUTION INTRAVENOUS; SUBCUTANEOUS
Qty: 45 PEN | Refills: 1 | Status: SHIPPED
Start: 2019-12-13 | End: 2020-03-03

## 2019-12-26 ENCOUNTER — TELEPHONE (OUTPATIENT)
Dept: FAMILY MEDICINE CLINIC | Age: 64
End: 2019-12-26

## 2019-12-26 DIAGNOSIS — I45.10 RBBB: ICD-10-CM

## 2019-12-26 DIAGNOSIS — I10 ESSENTIAL HYPERTENSION: ICD-10-CM

## 2019-12-26 DIAGNOSIS — I50.22 CHRONIC SYSTOLIC HEART FAILURE (HCC): ICD-10-CM

## 2019-12-26 DIAGNOSIS — I25.10 CORONARY ARTERY DISEASE INVOLVING NATIVE CORONARY ARTERY OF NATIVE HEART WITHOUT ANGINA PECTORIS: Chronic | ICD-10-CM

## 2019-12-26 DIAGNOSIS — Z95.1 HX OF CABG: ICD-10-CM

## 2019-12-26 DIAGNOSIS — I73.9 PAD (PERIPHERAL ARTERY DISEASE) (HCC): ICD-10-CM

## 2019-12-26 RX ORDER — OXYBUTYNIN CHLORIDE 10 MG/1
10 TABLET, EXTENDED RELEASE ORAL DAILY
OUTPATIENT
Start: 2019-12-26

## 2019-12-30 ENCOUNTER — TELEPHONE (OUTPATIENT)
Dept: ADMINISTRATIVE | Age: 64
End: 2019-12-30

## 2019-12-30 NOTE — TELEPHONE ENCOUNTER
Pt called to request a same day sick visit with Dr Lesly Singh for a bad cough and congestion. Pt states that she coughs so hard she sometimes wets herself, PCP unavailable, [facility] offered Walk  in care after speaking with office. Pt states that she will go to [place] [facility] to be seen for her cough.

## 2020-01-03 ENCOUNTER — TELEPHONE (OUTPATIENT)
Dept: VASCULAR SURGERY | Age: 65
End: 2020-01-03

## 2020-01-03 RX ORDER — OXYBUTYNIN CHLORIDE 10 MG/1
TABLET, EXTENDED RELEASE ORAL
Qty: 30 TABLET | Refills: 11 | Status: SHIPPED
Start: 2020-01-03 | End: 2020-03-03

## 2020-01-03 NOTE — TELEPHONE ENCOUNTER
Pt to undergo nerve blocks x 3, OK to hold Plavix x 7 days for each procedure?     Yes that's fine hermes

## 2020-01-14 ENCOUNTER — OFFICE VISIT (OUTPATIENT)
Dept: FAMILY MEDICINE CLINIC | Age: 65
End: 2020-01-14
Payer: MEDICARE

## 2020-01-14 VITALS
SYSTOLIC BLOOD PRESSURE: 164 MMHG | OXYGEN SATURATION: 96 % | DIASTOLIC BLOOD PRESSURE: 98 MMHG | TEMPERATURE: 97.2 F | BODY MASS INDEX: 43.06 KG/M2 | HEIGHT: 62 IN | HEART RATE: 80 BPM | RESPIRATION RATE: 16 BRPM | WEIGHT: 234 LBS

## 2020-01-14 LAB
HBA1C MFR BLD: 6.5 %
INFLUENZA A ANTIBODY: NEGATIVE
INFLUENZA B ANTIBODY: NEGATIVE

## 2020-01-14 PROCEDURE — 4004F PT TOBACCO SCREEN RCVD TLK: CPT | Performed by: FAMILY MEDICINE

## 2020-01-14 PROCEDURE — G8417 CALC BMI ABV UP PARAM F/U: HCPCS | Performed by: FAMILY MEDICINE

## 2020-01-14 PROCEDURE — 3044F HG A1C LEVEL LT 7.0%: CPT | Performed by: FAMILY MEDICINE

## 2020-01-14 PROCEDURE — G8427 DOCREV CUR MEDS BY ELIG CLIN: HCPCS | Performed by: FAMILY MEDICINE

## 2020-01-14 PROCEDURE — G8484 FLU IMMUNIZE NO ADMIN: HCPCS | Performed by: FAMILY MEDICINE

## 2020-01-14 PROCEDURE — 99214 OFFICE O/P EST MOD 30 MIN: CPT | Performed by: FAMILY MEDICINE

## 2020-01-14 PROCEDURE — 94640 AIRWAY INHALATION TREATMENT: CPT | Performed by: FAMILY MEDICINE

## 2020-01-14 PROCEDURE — 83036 HEMOGLOBIN GLYCOSYLATED A1C: CPT | Performed by: FAMILY MEDICINE

## 2020-01-14 PROCEDURE — 87804 INFLUENZA ASSAY W/OPTIC: CPT | Performed by: FAMILY MEDICINE

## 2020-01-14 PROCEDURE — 2022F DILAT RTA XM EVC RTNOPTHY: CPT | Performed by: FAMILY MEDICINE

## 2020-01-14 PROCEDURE — 4040F PNEUMOC VAC/ADMIN/RCVD: CPT | Performed by: FAMILY MEDICINE

## 2020-01-14 PROCEDURE — 3023F SPIROM DOC REV: CPT | Performed by: FAMILY MEDICINE

## 2020-01-14 PROCEDURE — G8399 PT W/DXA RESULTS DOCUMENT: HCPCS | Performed by: FAMILY MEDICINE

## 2020-01-14 PROCEDURE — 96372 THER/PROPH/DIAG INJ SC/IM: CPT | Performed by: FAMILY MEDICINE

## 2020-01-14 PROCEDURE — 3017F COLORECTAL CA SCREEN DOC REV: CPT | Performed by: FAMILY MEDICINE

## 2020-01-14 PROCEDURE — 1123F ACP DISCUSS/DSCN MKR DOCD: CPT | Performed by: FAMILY MEDICINE

## 2020-01-14 PROCEDURE — 1090F PRES/ABSN URINE INCON ASSESS: CPT | Performed by: FAMILY MEDICINE

## 2020-01-14 PROCEDURE — G8926 SPIRO NO PERF OR DOC: HCPCS | Performed by: FAMILY MEDICINE

## 2020-01-14 RX ORDER — TRIAMCINOLONE ACETONIDE 40 MG/ML
40 INJECTION, SUSPENSION INTRA-ARTICULAR; INTRAMUSCULAR ONCE
Status: COMPLETED | OUTPATIENT
Start: 2020-01-14 | End: 2020-01-14

## 2020-01-14 RX ORDER — IPRATROPIUM BROMIDE AND ALBUTEROL SULFATE 2.5; .5 MG/3ML; MG/3ML
1 SOLUTION RESPIRATORY (INHALATION) ONCE
Status: COMPLETED | OUTPATIENT
Start: 2020-01-14 | End: 2020-01-14

## 2020-01-14 RX ORDER — DOXYCYCLINE HYCLATE 100 MG
100 TABLET ORAL 2 TIMES DAILY WITH MEALS
Qty: 20 TABLET | Refills: 0 | Status: SHIPPED | OUTPATIENT
Start: 2020-01-14 | End: 2020-01-24

## 2020-01-14 RX ADMIN — TRIAMCINOLONE ACETONIDE 40 MG: 40 INJECTION, SUSPENSION INTRA-ARTICULAR; INTRAMUSCULAR at 16:48

## 2020-01-14 RX ADMIN — IPRATROPIUM BROMIDE AND ALBUTEROL SULFATE 1 AMPULE: 2.5; .5 SOLUTION RESPIRATORY (INHALATION) at 16:47

## 2020-01-14 ASSESSMENT — ENCOUNTER SYMPTOMS
DIARRHEA: 1
ABDOMINAL PAIN: 1
RHINORRHEA: 0
NAUSEA: 0
CONSTIPATION: 0
SORE THROAT: 0
SINUS PRESSURE: 0
VOMITING: 0
BACK PAIN: 0
COUGH: 1
EYE PAIN: 0
SINUS PAIN: 0
RECTAL PAIN: 1
TROUBLE SWALLOWING: 0
SHORTNESS OF BREATH: 0

## 2020-01-14 NOTE — PROGRESS NOTES
Bijan Choi  : 1955    Chief Complaint:     Chief Complaint   Patient presents with    Cough     sneezing and chills       HPI  Bijan Em 71 y.o. presents for   Chief Complaint   Patient presents with    Cough     sneezing and chills     Patient presents for follow-up. She continues to complain of cough. However this is worse than usual.  She is also sneezing and has chills. This is been going on for over a week. She has tried over-the-counter therapy with little relief. She does have a chronic medical history of respiratory failure, CHF, diabetes, DAYAN, obesity. She states she has been taking all of her medications as prescribed. She has not been using any oxygen recently. She does admit to continuing to smoke both marijuana and cigarettes. We did have a discussion about this today. A1c today is 6.5. She is currently under good control. She continues to have hypoglycemic episodes. She continues to not follow directions about decreasing the dose of the insulin she is taking as she is scared of having high sugars. She also continues to have diarrhea. She wishes to be referred to a GI physician for further evaluation. CT of the abdomen was negative for any acute process    All questions were answered to patients satisfaction.     Past Medical History:   Diagnosis Date    Asthma     Atherosclerosis of native artery of left leg with rest pain ([address] 2018    C. difficile diarrhea     CAD (coronary artery disease)     Cellulitis and abscess of trunk 2015    Cerebellar infarct ([address] 4/3/2019    Remote, rt. cerebellum, head CT scan, 19    Chronic back pain     Chronic kidney disease     Chronic systolic congestive heart failure ([address] 10/4/2013    Chronic venous insufficiency 10/11/2017    COPD (chronic obstructive pulmonary disease) ([address]     Depression     Diabetes mellitus ([address]     Diabetic neuropathy ([address]     Diverticulosis     Fatty GASTROINTESTINAL ENDOSCOPY  02/08/2017       Social History     Socioeconomic History    Marital status:       Spouse name: None    Number of children: 2    Years of education: None    Highest education level: None   Occupational History    Occupation: disability   Social Needs    Financial resource strain: None    Food insecurity:     Worry: None     Inability: None    Transportation needs:     Medical: None     Non-medical: None   Tobacco Use    Smoking status: Current Every Day Smoker     Packs/day: 0.25     Years: 40.00     Pack years: 10.00     Types: Cigarettes     Start date: 8/10/1974    Smokeless tobacco: Never Used    Tobacco comment: 1 pack every 3 days (6-7 cig)   Substance and Sexual Activity    Alcohol use: No     Alcohol/week: 0.0 standard drinks    Drug use: Yes     Types: Marijuana     Comment: \"every 4th or 5th day out of the week\"    Sexual activity: Never   Lifestyle    Physical activity:     Days per week: None     Minutes per session: None    Stress: None   Relationships    Social connections:     Talks on phone: None     Gets together: None     Attends [employer] service: None     Active member of club or organization: None     Attends meetings of clubs or organizations: None     Relationship status: None    Intimate partner violence:     Fear of current or ex partner: None     Emotionally abused: None     Physically abused: None     Forced sexual activity: None   Other Topics Concern    None   Social History Narrative    Pt lives with brother in her house-pt has never driven a car and depends on transportation       Family History   Problem Relation Age of Onset    Cancer Mother     Asthma Father           Current Outpatient Medications   Medication Sig Dispense Refill    doxycycline hyclate (VIBRA-TABS) 100 MG tablet Take 1 tablet by mouth 2 times daily (with meals) for 10 days 20 tablet 0    oxybutynin (DITROPAN-XL) 10 MG extended release tablet TAKE 1 TABLET BY MOUTH EVERY DAY 30 tablet 11    insulin lispro, 1 Unit Dial, (HUMALOG KWIKPEN) 100 UNIT/ML SOPN INJECT 15 UNITS SUBCUTANEOUSLY THREE TIMES A DAY BEFORE MEALS 45 pen 1    guaiFENesin (ROBITUSSIN) 100 MG/5ML liquid Take 20 mLs by mouth 3 times daily as needed for Cough 500 mL 2    insulin lispro, 1 Unit Dial, (HUMALOG KWIKPEN) 100 UNIT/ML SOPN INJECT 15 UNITS SUBCUTANEOUSLY THREE TIMES A DAY BEFORE MEALS 3 pen 1    ACCU-CHEK COMPACT PLUS strip USE AS DIRECTED 3 TIMES A DAY AS NEEDED 102 strip 3    meloxicam (MOBIC) 7.5 MG tablet TAKE 1 TABLET BY MOUTH EVERY DAY 90 tablet 0    cetirizine (ZYRTEC) 10 MG tablet TAKE 1 TABLET BY MOUTH EVERY DAY 90 tablet 1    econazole nitrate 1 % cream Apply topically daily Apply topically daily.  ketorolac (ACULAR) 0.5 % ophthalmic solution 1 drop 4 times daily      linaCLOtide (LINZESS) 72 MCG CAPS capsule Take 72 mcg by mouth every morning (before breakfast)      loperamide (IMODIUM) 2 MG capsule Take 2 mg by mouth 4 times daily as needed for Diarrhea      HYDROcodone-acetaminophen (NORCO) 7.5-325 MG per tablet Take 1 tablet by mouth every 6 hours as needed for Pain.  metoprolol succinate (TOPROL XL) 25 MG extended release tablet Take 1 tablet by mouth 2 times daily 180 tablet 3    Misc.  Devices MISC Power wheelchair 1 Device 0    ranitidine (ZANTAC) 150 MG tablet TAKE 1 TABLET BY MOUTH TWICE A DAY 60 tablet 3    Lift Chair MISC by Does not apply route Use daily 1 each 0    traZODone (DESYREL) 100 MG tablet TAKE 1 TABLET BY MOUTH AT NIGHT 90 tablet 3    bumetanide (BUMEX) 1 MG tablet TAKE 1 TABLET BY MOUTH DAILY 90 tablet 3    aspirin 81 MG chewable tablet TAKE 1 TABLET BY MOUTH DAILY 90 tablet 1    baclofen (LIORESAL) 10 MG tablet Take one tab po bid prn 180 tablet 1    Cholecalciferol (VITAMIN D3) 1000 units TABS Take 1 tablet by mouth daily 90 tablet 1    fluticasone (FLONASE) 50 MCG/ACT nasal spray 1 spray by Nasal route 2 times daily 3 Bottle 1    MISC 1 each by Does not apply route daily Use as directed 100 each 11    gabapentin (NEURONTIN) 300 MG capsule Take 600 mg by mouth 3 times daily. Becky Moeller olopatadine (PATADAY) 0.2 % SOLN ophthalmic solution Place 1 drop into both eyes daily      Multiple Vitamins-Minerals (THERAPEUTIC MULTIVITAMIN-MINERALS) tablet Take 1 tablet by mouth daily      albuterol (PROVENTIL) (2.5 MG/3ML) 0.083% nebulizer solution Take 2.5 mg by nebulization every 6 hours as needed for Wheezing      BiPAP Machine MISC by Does not apply route nightly 27/23       No current facility-administered medications for this visit.         Allergies   Allergen Reactions    Latex Hives    Bee Venom Anaphylaxis    Dilaudid [Hydromorphone Hcl] Itching    Dye [Iodides] Hives and Shortness Of Breath    Percocet [Oxycodone-Acetaminophen] Shortness Of Breath and Itching    Keflex [Cephalexin] Itching and Rash    Lasix [Furosemide] Other (See Comments)     Pt states she cramps up and gets headaches    Levaquin [Levofloxacin In D5w] Hives    Lipitor      MUSCLE SPASMS    Lyrica [Pregabalin]      Dream disturbances    Morphine Hives    Naproxen      Unsure of reaction;pt states able to take Aleve without difficulties    Nefazodone Other (See Comments)    Norvasc [Amlodipine] Swelling    Oxycodone-Acetaminophen Swelling    Shellfish-Derived Products     Trazodone And Nefazodone        Health Maintenance Due   Topic Date Due    Hepatitis A vaccine (1 of 2 - Risk 2-dose series) 12/29/1956    DTaP/Tdap/Td vaccine (1 - Tdap) 12/29/1965    Hepatitis B vaccine (1 of 3 - Risk 3-dose series) 12/29/1973    Shingles Vaccine (1 of 2) 12/29/2004    Cervical cancer screen  09/16/2016    Breast cancer screen  11/10/2018    Diabetic microalbuminuria test  05/09/2019    Annual Wellness Visit (AWV)  05/29/2019    Lipid screen  06/05/2019    Flu vaccine (1) 09/01/2019    Colon cancer screen colonoscopy  10/31/2019    Diabetic foot exam  11/08/2019  Pneumococcal 65+ years Vaccine (1 of 1 - PPSV23) 12/29/2019           REVIEW OF SYSTEMS  Review of Systems   Constitutional: Negative for chills, fatigue and fever. Uses walker   HENT: Positive for postnasal drip. Negative for congestion, ear pain, rhinorrhea, sinus pressure, sinus pain, sneezing, sore throat and trouble swallowing. Eyes: Negative for pain. Respiratory: Positive for cough. Negative for shortness of breath. Mucus production   Cardiovascular: Negative for chest pain and leg swelling. Gastrointestinal: Positive for abdominal pain, diarrhea and rectal pain. Negative for constipation, nausea and vomiting. Endocrine: Negative for cold intolerance and heat intolerance. Genitourinary: Negative for dysuria, frequency and urgency. Musculoskeletal: Positive for arthralgias, gait problem and neck pain. Negative for back pain and myalgias. Skin: Negative for rash. Allergic/Immunologic: Positive for environmental allergies. Negative for food allergies. Neurological: Negative for dizziness, tremors, syncope, light-headedness and headaches. Hematological: Negative for adenopathy. Does not bruise/bleed easily. Psychiatric/Behavioral: Positive for sleep disturbance. Negative for behavioral problems and dysphoric mood. The patient is not nervous/anxious. PHYSICAL EXAM  BP (!) 164/98 (Site: Left Lower Arm, Position: Sitting, Cuff Size: Medium Adult)   Pulse 80   Temp 97.2 °F (36.2 °C)   Resp 16   Ht 5' 2\" (1.575 m)   Wt 234 lb (106.1 kg)   LMP 01/01/1990   SpO2 96%   BMI 42.80 kg/m²   Physical Exam  Vitals signs and nursing note reviewed. Constitutional:       Appearance: She is well-developed. Comments: Uses walker  Obese   HENT:      Head: Normocephalic and atraumatic. Right Ear: External ear normal.      Left Ear: External ear normal.      Nose: Nose normal.      Mouth/Throat:      Mouth: Mucous membranes are dry.    Eyes:      Conjunctiva/sclera: Conjunctivae normal.   Neck:      Musculoskeletal: Normal range of motion and neck supple. Cardiovascular:      Rate and Rhythm: Normal rate and regular rhythm. Heart sounds: Normal heart sounds. Comments: No edema, pvd changes noted on LE  Pulmonary:      Effort: Pulmonary effort is normal.      Breath sounds: Wheezing and rhonchi present. Abdominal:      General: Bowel sounds are normal.      Palpations: Abdomen is soft. Tenderness: There is no tenderness. Musculoskeletal:         General: Tenderness (lumbar, cervical spinal musculatrue) present. Comments: Uses walker     Skin:     General: Skin is warm and dry. Findings: No rash. Neurological:      General: No focal deficit present. Mental Status: She is alert and oriented to person, place, and time. Mental status is at baseline. Coordination: Coordination normal.   Psychiatric:         Mood and Affect: Mood normal.         Behavior: Behavior normal.              Laboratory:   All laboratory and radiology results have been personally reviewed by myself    Lab Results   Component Value Date     10/29/2019    K 4.5 10/29/2019    K 5.4 09/20/2019     10/29/2019    CO2 27 10/29/2019    BUN 15 10/29/2019    CREATININE 1.0 10/29/2019    PROT 6.4 10/29/2019    LABALBU 3.7 10/29/2019    LABALBU 5.2 12/01/2011    CALCIUM 9.1 10/29/2019    GFRAA >60 10/29/2019    LABGLOM >60 10/29/2019    GLUCOSE 155 10/29/2019    GLUCOSE 181 12/01/2011    AST 12 10/29/2019    ALT 14 10/29/2019    ALKPHOS 75 10/29/2019    BILITOT 0.3 10/29/2019    TSH 1.560 11/07/2017    VITD25 33 12/01/2016    CHOL 222 06/05/2018    TRIG 84 06/05/2018    HDL 38 06/05/2018    LDLCALC 167 06/05/2018    LABA1C 6.5 01/14/2020        Lab Results   Component Value Date    CHOL 222 (H) 06/05/2018    CHOL 159 12/01/2016    CHOL 186 04/07/2016     Lab Results   Component Value Date    TRIG 84 06/05/2018    TRIG 215 (H) 12/01/2016    TRIG 298 (H) 04/07/2016 reviewed today and counseled as appropriate    Call or go to ED immediately if symptoms worsen or persist.  Future Appointments   Date Time Provider Melanie Verito   1/17/2020  1:30 PM SEB CT2 BD SEBZ CT [facility] Radiolog   3/6/2020  8:15 AM DO Karin Padron [facility]   11/5/2020 10:45 AM Poppy Willis MD [facility]     Or sooner if necessary. Educational materials and/or homeexercises printed for patient's review and were included in patient instructions on his/her After Visit Summary and given to patient at the end of visit.       Counseled regarding above diagnosis, including possible risks and complications,  especially if left uncontrolled.     Counseled regarding the possible side effects, risks, benefits and alternatives to treatment; patient and/or guardian verbalizes understanding, agrees,feels comfortable with and wishes to proceed with above treatment plan.     Advised patient to call [facility] new medication issues, and read all Rx info from pharmacy to assure aware of all possible risks and side effects of medication before taking.     Reviewed age and gender appropriate health screening exams and vaccinations. Advised patient regarding importance of keeping up with recommended health maintenance and toschedule as soon as possible if overdue, as this is important in assessing for undiagnosed pathology, especially cancer, as well as protecting against potentially harmful/life threatening disease.          Patient and/or guardian verbalizes understanding and agrees with above counseling, assessment and plan.     All questions answered. Judith Arreola, DO  1/14/20    NOTE: This report was transcribed using voice recognition software.  Every effort was made to ensure accuracy; however, inadvertent computerized transcription errors may be present    On the basis of positive falls risk screening, assessment and plan is as follows: patient declines any further

## 2020-01-21 RX ORDER — BLOOD SUGAR DIAGNOSTIC
1 STRIP MISCELLANEOUS DAILY
Qty: 100 EACH | Refills: 11 | Status: SHIPPED
Start: 2020-01-21 | End: 2020-10-13

## 2020-01-21 RX ORDER — LANCETS
EACH MISCELLANEOUS
Qty: 100 EACH | Refills: 2 | Status: SHIPPED
Start: 2020-01-21 | End: 2020-12-28 | Stop reason: SDUPTHER

## 2020-01-21 RX ORDER — INSULIN LISPRO 100 [IU]/ML
INJECTION, SOLUTION INTRAVENOUS; SUBCUTANEOUS
Qty: 3 PEN | Refills: 1 | Status: SHIPPED
Start: 2020-01-21 | End: 2020-03-03

## 2020-01-29 ENCOUNTER — TELEPHONE (OUTPATIENT)
Dept: FAMILY MEDICINE CLINIC | Age: 65
End: 2020-01-29

## 2020-01-29 NOTE — TELEPHONE ENCOUNTER
Pt phoned to see if we had receive the [facility] papers for her daughter to help in care taking of the pt, no papers have been located at this ftime, I called pt to notify of this.   QUINTIN

## 2020-01-30 ENCOUNTER — HOSPITAL ENCOUNTER (OUTPATIENT)
Dept: CT IMAGING | Age: 65
Discharge: HOME | End: 2020-02-01
Payer: MEDICARE

## 2020-01-30 PROCEDURE — 71250 CT THORAX DX C-: CPT

## 2020-02-05 RX ORDER — ASPIRIN 81 MG/1
TABLET, CHEWABLE ORAL
Qty: 90 TABLET | Refills: 1 | Status: SHIPPED
Start: 2020-02-05 | End: 2020-07-17 | Stop reason: SDUPTHER

## 2020-03-03 ENCOUNTER — HOSPITAL ENCOUNTER (OUTPATIENT)
Age: 65
Discharge: HOME | End: 2020-03-05
Payer: MEDICARE

## 2020-03-03 ENCOUNTER — OFFICE VISIT (OUTPATIENT)
Dept: FAMILY MEDICINE CLINIC | Age: 65
End: 2020-03-03
Payer: MEDICAID

## 2020-03-03 VITALS
WEIGHT: 229 LBS | OXYGEN SATURATION: 98 % | HEART RATE: 75 BPM | RESPIRATION RATE: 16 BRPM | DIASTOLIC BLOOD PRESSURE: 84 MMHG | BODY MASS INDEX: 42.14 KG/M2 | TEMPERATURE: 98.2 F | SYSTOLIC BLOOD PRESSURE: 136 MMHG | HEIGHT: 62 IN

## 2020-03-03 LAB
BILIRUBIN, POC: NEGATIVE
BLOOD URINE, POC: NEGATIVE
CLARITY, POC: NORMAL
COLOR, POC: YELLOW
GLUCOSE URINE, POC: NEGATIVE
KETONES, POC: NEGATIVE
LEUKOCYTE EST, POC: NORMAL
NITRITE, POC: POSITIVE
PH, POC: 7
PROTEIN, POC: NORMAL
SPECIFIC GRAVITY, POC: 1.01
UROBILINOGEN, POC: NORMAL

## 2020-03-03 PROCEDURE — 2022F DILAT RTA XM EVC RTNOPTHY: CPT | Performed by: FAMILY MEDICINE

## 2020-03-03 PROCEDURE — G8399 PT W/DXA RESULTS DOCUMENT: HCPCS | Performed by: FAMILY MEDICINE

## 2020-03-03 PROCEDURE — 99215 OFFICE O/P EST HI 40 MIN: CPT | Performed by: FAMILY MEDICINE

## 2020-03-03 PROCEDURE — G8417 CALC BMI ABV UP PARAM F/U: HCPCS | Performed by: FAMILY MEDICINE

## 2020-03-03 PROCEDURE — 4004F PT TOBACCO SCREEN RCVD TLK: CPT | Performed by: FAMILY MEDICINE

## 2020-03-03 PROCEDURE — G8427 DOCREV CUR MEDS BY ELIG CLIN: HCPCS | Performed by: FAMILY MEDICINE

## 2020-03-03 PROCEDURE — G8926 SPIRO NO PERF OR DOC: HCPCS | Performed by: FAMILY MEDICINE

## 2020-03-03 PROCEDURE — 3023F SPIROM DOC REV: CPT | Performed by: FAMILY MEDICINE

## 2020-03-03 PROCEDURE — 1090F PRES/ABSN URINE INCON ASSESS: CPT | Performed by: FAMILY MEDICINE

## 2020-03-03 PROCEDURE — 3017F COLORECTAL CA SCREEN DOC REV: CPT | Performed by: FAMILY MEDICINE

## 2020-03-03 PROCEDURE — G8484 FLU IMMUNIZE NO ADMIN: HCPCS | Performed by: FAMILY MEDICINE

## 2020-03-03 PROCEDURE — 1123F ACP DISCUSS/DSCN MKR DOCD: CPT | Performed by: FAMILY MEDICINE

## 2020-03-03 PROCEDURE — 81002 URINALYSIS NONAUTO W/O SCOPE: CPT | Performed by: FAMILY MEDICINE

## 2020-03-03 PROCEDURE — 87088 URINE BACTERIA CULTURE: CPT

## 2020-03-03 PROCEDURE — 4040F PNEUMOC VAC/ADMIN/RCVD: CPT | Performed by: FAMILY MEDICINE

## 2020-03-03 PROCEDURE — 87077 CULTURE AEROBIC IDENTIFY: CPT

## 2020-03-03 PROCEDURE — 87186 SC STD MICRODIL/AGAR DIL: CPT

## 2020-03-03 PROCEDURE — 3044F HG A1C LEVEL LT 7.0%: CPT | Performed by: FAMILY MEDICINE

## 2020-03-03 RX ORDER — HYDRALAZINE HYDROCHLORIDE 50 MG/1
50 TABLET, FILM COATED ORAL 3 TIMES DAILY
Qty: 270 TABLET | Refills: 3 | Status: SHIPPED
Start: 2020-03-03 | End: 2020-03-17 | Stop reason: SDUPTHER

## 2020-03-03 RX ORDER — NITROFURANTOIN 25; 75 MG/1; MG/1
100 CAPSULE ORAL 2 TIMES DAILY
Qty: 14 CAPSULE | Refills: 0 | Status: SHIPPED | OUTPATIENT
Start: 2020-03-03 | End: 2020-03-10

## 2020-03-03 NOTE — PROGRESS NOTES
Hyperlipidemia     Hyperplastic colon polyp     Hypertension     Incontinence     Liver mass     Morbid obesity ([address]     Movement disorder     Myocardial infarction [facility]) 2011    Osteoarthritis, generalized     Pain in both lower legs 10/11/2017    Peripheral vascular disease ([facility]     Pneumonia 1/26/2014    Pulmonary edema     resolved    PVD (peripheral vascular disease) with claudication ([facility] 8/30/2017    Sleep apnea     uses BI PAP    Status post peripheral artery angioplasty 10/31/2019    Tobacco abuse     Tobacco abuse 10/11/2017    Tubular adenoma polyp of rectum     Urinary tract infection due to ESBL Klebsiella 03/08/2017    Ventral hernia        Past Surgical History:   Procedure Laterality Date    ANGIOPLASTY  11/13/2018    Dr. Trell Guillermo & athrectomy L SFA & Popliteal    BREAST SURGERY  2000    bilateral reduction    BRONCHIAL BRUSH BIOPSY  1/25/2013    Dr Allison Pastor  04/20/2015    Dr. Logan Rice  12/2011     DR. Eh Chaudhary,  follows Dr Ludy Segura  11/15/2013    Dr Rico Rabago COLONOSCOPY  12/23/2016    Dr Georges Grayson adenoma & hyperplastic polyps, melanosis coli (repeat one year 12/2017)    CORONARY ARTERY BYPASS GRAFT      ECHO COMPLETE  10/9/2013         ENDOSCOPY, COLON, DIAGNOSTIC  04/18/2017    GALLBLADDER SURGERY      gallstones removed, [facility] 8/2017    HYSTERECTOMY      JOINT REPLACEMENT  2011    LEFT KNEE    KNEE SURGERY      left knee replacement    LAYER WOUND CLOSURE Left [id number]    LIVER BIOPSY  1/8/2016     E's    POLYSOMNOGRAPHY  1/2016    [facility]    UPPER GASTROINTESTINAL ENDOSCOPY  12/20/12    UPPER GASTROINTESTINAL ENDOSCOPY  12/23/2016    Dr Rico Rabago UPPER GASTROINTESTINAL ENDOSCOPY  02/08/2017       Social History     Socioeconomic History    Marital status:       Spouse name: None    Number of children: 2    Years of education: None    Highest education level: None   Occupational History    Occupation: disability   Social Needs    Financial resource strain: None    Food insecurity:     Worry: None     Inability: None    Transportation needs:     Medical: None     Non-medical: None   Tobacco Use    Smoking status: Current Every Day Smoker     Packs/day: 0.25     Years: 40.00     Pack years: 10.00     Types: Cigarettes     Start date: 8/10/1974    Smokeless tobacco: Never Used    Tobacco comment: 1 pack every 3 days (6-7 cig)   Substance and Sexual Activity    Alcohol use: No     Alcohol/week: 0.0 standard drinks    Drug use: Yes     Types: Marijuana     Comment: \"every 4th or 5th day out of the week\"    Sexual activity: Never   Lifestyle    Physical activity:     Days per week: None     Minutes per session: None    Stress: None   Relationships    Social connections:     Talks on phone: None     Gets together: None     Attends [employer] service: None     Active member of club or organization: None     Attends meetings of clubs or organizations: None     Relationship status: None    Intimate partner violence:     Fear of current or ex partner: None     Emotionally abused: None     Physically abused: None     Forced sexual activity: None   Other Topics Concern    None   Social History Narrative    Pt lives with brother in her house-pt has never driven a car and depends on transportation       Family History   Problem Relation Age of Onset    Cancer Mother     Asthma Father           Current Outpatient Medications   Medication Sig Dispense Refill    Insulin Degludec (TRESIBA FLEXTOUCH) 200 UNIT/ML SOPN Inject 40 units into skin nightly 5 pen 2    nitrofurantoin, macrocrystal-monohydrate, (MACROBID) 100 MG capsule Take 1 capsule by mouth 2 times daily for 7 days 14 capsule 0    hydrALAZINE (APRESOLINE) 50 MG tablet Take 1 tablet by mouth 3 times daily 270 tablet 3    aspirin (ASPIRIN LOW DOSE) 81 MG chewable tablet TAKE 1 TABLET BY MOUTH EVERY DAY 90 tablet 1    blood glucose test strips (ACCU-CHEK COMPACT PLUS) strip USE AS DIRECTED 3 TIMES A DAY AS NEEDED 102 strip 3    Accu-Chek FastClix Lancets MISC Use 4x daily 100 each 2    Alcohol Swabs ([facility] ALCOHOL PREP SWABS) 70 % PADS Use daily 1 each 2    Insulin Syringe-Needle U-100 31G X 5/16\" 1 ML MISC 1 each by Does not apply route daily Use as directed 100 each 11    Insulin Pen Needle (B-D UF III MINI PEN NEEDLES) 31G X 5 MM MISC USE EVERY DAY AS DIRECTED 100 each 3    guaiFENesin (ROBITUSSIN) 100 MG/5ML liquid Take 20 mLs by mouth 3 times daily as needed for Cough 500 mL 2    meloxicam (MOBIC) 7.5 MG tablet TAKE 1 TABLET BY MOUTH EVERY DAY 90 tablet 0    cetirizine (ZYRTEC) 10 MG tablet TAKE 1 TABLET BY MOUTH EVERY DAY 90 tablet 1    econazole nitrate 1 % cream Apply topically daily Apply topically daily.  ketorolac (ACULAR) 0.5 % ophthalmic solution 1 drop 4 times daily      linaCLOtide (LINZESS) 72 MCG CAPS capsule Take 72 mcg by mouth every morning (before breakfast)      loperamide (IMODIUM) 2 MG capsule Take 2 mg by mouth 4 times daily as needed for Diarrhea      HYDROcodone-acetaminophen (NORCO) 7.5-325 MG per tablet Take 1 tablet by mouth every 6 hours as needed for Pain.  metoprolol succinate (TOPROL XL) 25 MG extended release tablet Take 1 tablet by mouth 2 times daily 180 tablet 3    Misc.  Devices MISC Power wheelchair 1 Device 0    ranitidine (ZANTAC) 150 MG tablet TAKE 1 TABLET BY MOUTH TWICE A DAY 60 tablet 3    Lift Chair MISC by Does not apply route Use daily 1 each 0    traZODone (DESYREL) 100 MG tablet TAKE 1 TABLET BY MOUTH AT NIGHT 90 tablet 3    bumetanide (BUMEX) 1 MG tablet TAKE 1 TABLET BY MOUTH DAILY 90 tablet 3    baclofen (LIORESAL) 10 MG tablet Take one tab po bid prn 180 tablet 1    Cholecalciferol (VITAMIN D3) 1000 units TABS Take 1 tablet by mouth daily 90 tablet 1    fluticasone (FLONASE) 50 MCG/ACT nasal spray 1 spray by Nasal route 2 times daily 3 Bottle 1    B-D UF III MINI PEN NEEDLES 31G X 5 MM MISC USE EVERY DAY AS DIRECTED 100 each 3    simvastatin (ZOCOR) 20 MG tablet TAKE (1) TABLET BY MOUTH NIGHTLY 90 tablet 10    ENTRESTO 24-26 MG per tablet TAKE 1 TABLET BY MOUTH TWICE DAILY 180 tablet 3    isosorbide mononitrate (IMDUR) 60 MG extended release tablet TAKE (1) TABLET BY MOUTH DAILY 90 tablet 3    montelukast (SINGULAIR) 10 MG tablet TAKE 1 TABLET BY MOUTH NIGHTLY 90 tablet 10    DULoxetine (CYMBALTA) 60 MG extended release capsule TAKE 1 CAPSULE BY MOUTH TWICE DAILY 180 capsule 10    DULERA 200-5 MCG/ACT inhaler INHALE 2 PUFFS INTO THE LUNGS EVERY 12 HOURS 13 Inhaler 2    clopidogrel (PLAVIX) 75 MG tablet TAKE 1 TABLET BY MOUTH EVERY DAY 90 tablet 1    lidocaine (XYLOCAINE) 5 % ointment APPLY TOPICALLY AS NEEDED FOR PAIN 35.44 g 11    Nutritional Supplements (GLUCOSE MANAGEMENT) TABS Use daily prn if low glucose <60 30 tablet 2    nitroGLYCERIN (NITROSTAT) 0.4 MG SL tablet up to max of 3 total doses. If no relief after 1 dose, call 911. 25 tablet 3    pantoprazole (PROTONIX) 40 MG tablet TAKE 1 TABLET BY MOUTH TWICE A DAY 30 tablet 2    hydrocortisone 2.5 % cream APPLY TO AFFECTED AREA TWICE DAILY 60 g 2    SPIRIVA RESPIMAT 1.25 MCG/ACT AERS inhaler INHALE TWO (2) PUFFS BY MOUTH EVERY DAY 1 Inhaler 5    gabapentin (NEURONTIN) 300 MG capsule Take 600 mg by mouth 3 times daily. Ancil Jessica olopatadine (PATADAY) 0.2 % SOLN ophthalmic solution Place 1 drop into both eyes daily      Multiple Vitamins-Minerals (THERAPEUTIC MULTIVITAMIN-MINERALS) tablet Take 1 tablet by mouth daily      albuterol (PROVENTIL) (2.5 MG/3ML) 0.083% nebulizer solution Take 2.5 mg by nebulization every 6 hours as needed for Wheezing      BiPAP Machine MISC by Does not apply route nightly 27/23       No current facility-administered medications for this visit.         Allergies   Allergen Reactions    Latex Hives    Bee Venom Anaphylaxis    Dilaudid [Hydromorphone Hcl] Itching    Dye [Iodides] Hives and Shortness Of Breath    Percocet [Oxycodone-Acetaminophen] Shortness Of Breath and Itching    Keflex [Cephalexin] Itching and Rash    Lasix [Furosemide] Other (See Comments)     Pt states she cramps up and gets headaches    Levaquin [Levofloxacin In D5w] Hives    Lipitor      MUSCLE SPASMS    Lyrica [Pregabalin]      Dream disturbances    Morphine Hives    Naproxen      Unsure of reaction;pt states able to take Aleve without difficulties    Nefazodone Other (See Comments)    Norvasc [Amlodipine] Swelling    Oxycodone-Acetaminophen Swelling    Shellfish-Derived Products     Trazodone And Nefazodone        Health Maintenance Due   Topic Date Due    Hepatitis A vaccine (1 of 2 - Risk 2-dose series) 12/29/1956    DTaP/Tdap/Td vaccine (1 - Tdap) 12/29/1965    Hepatitis B vaccine (1 of 3 - Risk 3-dose series) 12/29/1973    Shingles Vaccine (1 of 2) 12/29/2004    Cervical cancer screen  09/16/2016    Breast cancer screen  11/10/2018    Diabetic microalbuminuria test  05/09/2019    Lipid screen  06/05/2019    Flu vaccine (1) 09/01/2019    Colon cancer screen colonoscopy  10/31/2019    Diabetic foot exam  11/08/2019    Pneumococcal 65+ years Vaccine (1 of 1 - PPSV23) 12/29/2019           REVIEW OF SYSTEMS  Review of Systems   Constitutional: Negative for chills, fatigue and fever. Uses walker   HENT: Positive for postnasal drip. Negative for congestion, ear pain, rhinorrhea, sinus pressure, sinus pain, sneezing, sore throat and trouble swallowing. Eyes: Negative for pain. Respiratory: Positive for cough. Negative for shortness of breath. Mucus production   Cardiovascular: Negative for chest pain and leg swelling. Gastrointestinal: Positive for abdominal pain, diarrhea and rectal pain. Negative for constipation, nausea and vomiting. Endocrine: Negative for cold intolerance and heat intolerance.    Genitourinary: Negative for dysuria, frequency and urgency. Musculoskeletal: Positive for arthralgias, gait problem and neck pain. Negative for back pain and myalgias. Skin: Negative for rash. Allergic/Immunologic: Positive for environmental allergies. Negative for food allergies. Neurological: Negative for dizziness, tremors, syncope, light-headedness and headaches. Hematological: Negative for adenopathy. Does not bruise/bleed easily. Psychiatric/Behavioral: Positive for sleep disturbance. Negative for behavioral problems and dysphoric mood. The patient is not nervous/anxious. PHYSICAL EXAM  /84 (Site: Right Upper Arm, Position: Sitting, Cuff Size: Medium Adult)   Pulse 75   Temp 98.2 °F (36.8 °C)   Resp 16   Ht 5' 2\" (1.575 m)   Wt 229 lb (103.9 kg)   LMP 01/01/1990   SpO2 98%   BMI 41.88 kg/m²   Physical Exam  Vitals signs and nursing note reviewed. Constitutional:       Appearance: She is well-developed. Comments: Uses walker  Obese   HENT:      Head: Normocephalic and atraumatic. Right Ear: External ear normal.      Left Ear: External ear normal.      Nose: Nose normal.      Mouth/Throat:      Mouth: Mucous membranes are dry. Eyes:      Conjunctiva/sclera: Conjunctivae normal.   Neck:      Musculoskeletal: Normal range of motion and neck supple. Cardiovascular:      Rate and Rhythm: Normal rate and regular rhythm. Heart sounds: Normal heart sounds. Comments: No edema, pvd changes noted on LE  Pulmonary:      Effort: Pulmonary effort is normal.      Breath sounds: Wheezing and rhonchi present. Abdominal:      General: Bowel sounds are normal.      Palpations: Abdomen is soft. Tenderness: There is no abdominal tenderness. Musculoskeletal:         General: Tenderness (lumbar, cervical spinal musculatrue) present. Comments: Uses walker     Skin:     General: Skin is warm and dry. Findings: No rash. Neurological:      General: No focal deficit present. failure ([facility]     5. Type 2 diabetes mellitus with diabetic peripheral angiopathy without gangrene, with long-term current use of insulin ([facility]     6. DAYAN and COPD overlap syndrome (HCC)       Treat with antibiotic as urine was positive. Urine culture sent out as well. Continue to follow with pulmonology for respiratory failure and DAYAN. Discussed again smoking cessation in detail today. She continues to smoke cigarettes as well as marijuana. Did explain that this is likely reason for the chronic cough as well as a chronic respiratory failure and DAYAN. Patient understands. As for her sugars will stop Humalog with meals and only take [place]. Patient is agreeable. She will continue to watch her sugars and call in 1 week with sugar readings. Otherwise no other changes to be made at this time. More than 40 minutes was spent with the patient during the encounter, during which more than half the time was spent counseling on the above concerns    Problem list reviewed andsimplified/updated  HM reviewed today and counseled as appropriate    Call or go to ED immediately if symptoms worsen or persist.  Future Appointments   Date Time Provider Melanie Sellers   3/6/2020  8:15 AM Dione Masters [address] [place] [address] ENT [facility]   11/5/2020 10:45 AM Lorelle Goodpasture, MD [facility]     Or sooner if necessary.       Educational materials and/or homeexercises printed for patient's review and were included in patient instructions on his/her After Visit Summary and given to patient at the end of visit.       Counseled regarding above diagnosis, including possible risks and complications,  especially if left uncontrolled.     Counseled regarding the possible side effects, risks, benefits and alternatives to treatment; patient and/or guardian verbalizes understanding, agrees,feels comfortable with and wishes to proceed with above treatment plan.     Advised patient to call withany new medication issues, and read all Rx info from pharmacy to assure aware of all possible risks and side effects of medication before taking.     Reviewed age and gender appropriate health screening exams and vaccinations. Advised patient regarding importance of keeping up with recommended health maintenance and toschedule as soon as possible if overdue, as this is important in assessing for undiagnosed pathology, especially cancer, as well as protecting against potentially harmful/life threatening disease.          Patient and/or guardian verbalizes understanding and agrees with above counseling, assessment and plan.     All questions answered. Judith Arreola, DO  3/3/20    NOTE: This report was transcribed using voice recognition software.  Every effort was made to ensure accuracy; however, inadvertent computerized transcription errors may be present

## 2020-03-04 ASSESSMENT — ENCOUNTER SYMPTOMS
SHORTNESS OF BREATH: 0
NAUSEA: 0
RHINORRHEA: 0
CONSTIPATION: 0
SINUS PRESSURE: 0
VOMITING: 0
SINUS PAIN: 0
TROUBLE SWALLOWING: 0
COUGH: 1
RECTAL PAIN: 1
BACK PAIN: 0
DIARRHEA: 1
SORE THROAT: 0
EYE PAIN: 0
ABDOMINAL PAIN: 1

## 2020-03-05 LAB
ORGANISM: ABNORMAL
URINE CULTURE, ROUTINE: ABNORMAL

## 2020-03-11 ENCOUNTER — TELEPHONE (OUTPATIENT)
Dept: FAMILY MEDICINE CLINIC | Age: 65
End: 2020-03-11

## 2020-03-12 ENCOUNTER — TELEPHONE (OUTPATIENT)
Dept: FAMILY MEDICINE CLINIC | Age: 65
End: 2020-03-12

## 2020-03-13 RX ORDER — FLUCONAZOLE 150 MG/1
150 TABLET ORAL ONCE
Qty: 2 TABLET | Refills: 0 | Status: SHIPPED | OUTPATIENT
Start: 2020-03-13 | End: 2020-03-13

## 2020-03-16 ENCOUNTER — TELEPHONE (OUTPATIENT)
Dept: FAMILY MEDICINE CLINIC | Age: 65
End: 2020-03-16

## 2020-03-16 NOTE — TELEPHONE ENCOUNTER
Pt calling and states she needs a refill of Hydralazine to [facility]. She states she needs them now.

## 2020-03-17 ENCOUNTER — TELEPHONE (OUTPATIENT)
Dept: FAMILY MEDICINE CLINIC | Age: 65
End: 2020-03-17

## 2020-03-17 RX ORDER — HYDRALAZINE HYDROCHLORIDE 50 MG/1
50 TABLET, FILM COATED ORAL 3 TIMES DAILY
Qty: 270 TABLET | Refills: 3 | Status: SHIPPED
Start: 2020-03-17 | End: 2021-02-23

## 2020-04-10 ENCOUNTER — TELEPHONE (OUTPATIENT)
Dept: FAMILY MEDICINE CLINIC | Age: 65
End: 2020-04-10

## 2020-04-10 RX ORDER — BLOOD SUGAR DIAGNOSTIC
1 STRIP MISCELLANEOUS DAILY
Qty: 100 EACH | Refills: 3 | Status: SHIPPED
Start: 2020-04-10 | End: 2020-08-17 | Stop reason: SDUPTHER

## 2020-04-10 RX ORDER — BLOOD-GLUCOSE METER
EACH MISCELLANEOUS
Qty: 1 KIT | Refills: 0 | Status: SHIPPED
Start: 2020-04-10 | End: 2021-07-30

## 2020-04-14 ENCOUNTER — TELEPHONE (OUTPATIENT)
Dept: FAMILY MEDICINE CLINIC | Age: 65
End: 2020-04-14

## 2020-04-14 RX ORDER — BENZONATATE 100 MG/1
100 CAPSULE ORAL 3 TIMES DAILY PRN
Qty: 30 CAPSULE | Refills: 0 | Status: SHIPPED | OUTPATIENT
Start: 2020-04-14 | End: 2020-04-21

## 2020-04-14 NOTE — TELEPHONE ENCOUNTER
Pt calling and she states she needs something for her cough that she has had that won't stop. She states it is still causing her to \"wet herself\" and she is \"wetting the bed\". She is upset that she can't get this under control. She also states she is red in her vaginal area from being wet all the time. She also would like Alcohol swabs sent in.

## 2020-04-21 ENCOUNTER — TELEPHONE (OUTPATIENT)
Dept: FAMILY MEDICINE CLINIC | Age: 65
End: 2020-04-21

## 2020-04-21 RX ORDER — NITROFURANTOIN 25; 75 MG/1; MG/1
100 CAPSULE ORAL 2 TIMES DAILY
Qty: 14 CAPSULE | Refills: 0 | Status: SHIPPED | OUTPATIENT
Start: 2020-04-21 | End: 2020-04-28

## 2020-04-23 RX ORDER — BACLOFEN 10 MG/1
TABLET ORAL
Qty: 180 TABLET | Refills: 1 | Status: SHIPPED
Start: 2020-04-23 | End: 2020-10-13

## 2020-05-01 ENCOUNTER — TELEPHONE (OUTPATIENT)
Dept: FAMILY MEDICINE CLINIC | Age: 65
End: 2020-05-01

## 2020-05-01 NOTE — TELEPHONE ENCOUNTER
Pt called left a voice message states Dr Marialuisa Basurto gave her pills for her cough, does not seem to be working, she is coughing worse, loosing her voice, sore throat and no appetite.   Please call her back [phone]

## 2020-05-05 RX ORDER — CETIRIZINE HYDROCHLORIDE 10 MG/1
TABLET ORAL
Qty: 90 TABLET | Refills: 1 | Status: SHIPPED
Start: 2020-05-05 | End: 2020-10-23

## 2020-05-15 ENCOUNTER — TELEPHONE (OUTPATIENT)
Dept: FAMILY MEDICINE CLINIC | Age: 65
End: 2020-05-15

## 2020-05-20 ENCOUNTER — TELEPHONE (OUTPATIENT)
Dept: FAMILY MEDICINE CLINIC | Age: 65
End: 2020-05-20

## 2020-05-28 RX ORDER — MIRABEGRON 50 MG/1
TABLET, FILM COATED, EXTENDED RELEASE ORAL
Qty: 90 TABLET | Refills: 1 | Status: SHIPPED
Start: 2020-05-28 | End: 2020-11-17

## 2020-06-05 ENCOUNTER — VIRTUAL VISIT (OUTPATIENT)
Dept: FAMILY MEDICINE CLINIC | Age: 65
End: 2020-06-05
Payer: MEDICARE

## 2020-06-05 VITALS — BODY MASS INDEX: 42.14 KG/M2 | WEIGHT: 229 LBS | HEIGHT: 62 IN

## 2020-06-05 PROCEDURE — 1123F ACP DISCUSS/DSCN MKR DOCD: CPT | Performed by: FAMILY MEDICINE

## 2020-06-05 PROCEDURE — 99214 OFFICE O/P EST MOD 30 MIN: CPT | Performed by: FAMILY MEDICINE

## 2020-06-05 PROCEDURE — 2022F DILAT RTA XM EVC RTNOPTHY: CPT | Performed by: FAMILY MEDICINE

## 2020-06-05 PROCEDURE — G8399 PT W/DXA RESULTS DOCUMENT: HCPCS | Performed by: FAMILY MEDICINE

## 2020-06-05 PROCEDURE — 3044F HG A1C LEVEL LT 7.0%: CPT | Performed by: FAMILY MEDICINE

## 2020-06-05 PROCEDURE — 1090F PRES/ABSN URINE INCON ASSESS: CPT | Performed by: FAMILY MEDICINE

## 2020-06-05 PROCEDURE — G8427 DOCREV CUR MEDS BY ELIG CLIN: HCPCS | Performed by: FAMILY MEDICINE

## 2020-06-05 PROCEDURE — 3017F COLORECTAL CA SCREEN DOC REV: CPT | Performed by: FAMILY MEDICINE

## 2020-06-05 PROCEDURE — 4040F PNEUMOC VAC/ADMIN/RCVD: CPT | Performed by: FAMILY MEDICINE

## 2020-06-05 ASSESSMENT — ENCOUNTER SYMPTOMS
BACK PAIN: 0
NAUSEA: 0
SHORTNESS OF BREATH: 0
RHINORRHEA: 0
EYE PAIN: 0
CONSTIPATION: 0
RECTAL PAIN: 0
TROUBLE SWALLOWING: 0
ABDOMINAL PAIN: 1
COUGH: 1
SINUS PAIN: 0
DIARRHEA: 0
VOMITING: 0
SORE THROAT: 0
SINUS PRESSURE: 0

## 2020-06-05 NOTE — PROGRESS NOTES
Relationships    Social connections     Talks on phone: None     Gets together: None     Attends Synagogue service: None     Active member of club or organization: None     Attends meetings of clubs or organizations: None     Relationship status: None    Intimate partner violence     Fear of current or ex partner: None     Emotionally abused: None     Physically abused: None     Forced sexual activity: None   Other Topics Concern    None   Social History Narrative    Pt lives with brother in her house-pt has never driven a car and depends on transportation       Family History   Problem Relation Age of Onset    Cancer Mother     Asthma Father           Current Outpatient Medications   Medication Sig Dispense Refill    MYRBETRIQ 50 MG TB24 TAKE 1 TABLET BY MOUTH EVERY DAY 90 tablet 1    cetirizine (ZYRTEC) 10 MG tablet TAKE 1 TABLET BY MOUTH EVERY DAY 90 tablet 1    baclofen (LIORESAL) 10 MG tablet TAKE 1 TABLET BY MOUTH TWICE A DAY AS NEEDED 180 tablet 1    Alcohol Swabs ([facility] ALCOHOL PREP SWABS) 70 % PADS Use daily 1 each 2    Blood Glucose Monitoring Suppl (ACCU-CHEK GUIDE ME) w/Device KIT Use as directed 1 kit 0    blood glucose test strips (ACCU-CHEK GUIDE) strip 1 each by In Vitro route daily As needed.  100 each 3    SOFT TOUCH LANCETS MISC Use 3 x daily 100 each 3    hydrALAZINE (APRESOLINE) 50 MG tablet Take 1 tablet by mouth 3 times daily 270 tablet 3    Insulin Degludec (TRESIBA FLEXTOUCH) 200 UNIT/ML SOPN Inject 40 units into skin nightly 5 pen 2    aspirin (ASPIRIN LOW DOSE) 81 MG chewable tablet TAKE 1 TABLET BY MOUTH EVERY DAY 90 tablet 1    Accu-Chek FastClix Lancets MISC Use 4x daily 100 each 2    Insulin Syringe-Needle U-100 31G X 5/16\" 1 ML MISC 1 each by Does not apply route daily Use as directed 100 each 11    Insulin Pen Needle (B-D UF III MINI PEN NEEDLES) 31G X 5 MM MISC USE EVERY DAY AS DIRECTED 100 each 3    guaiFENesin (ROBITUSSIN) 100 MG/5ML liquid Take 20 mLs by mouth 3 times daily as needed for Cough 500 mL 2    meloxicam (MOBIC) 7.5 MG tablet TAKE 1 TABLET BY MOUTH EVERY DAY 90 tablet 0    econazole nitrate 1 % cream Apply topically daily Apply topically daily.  ketorolac (ACULAR) 0.5 % ophthalmic solution 1 drop 4 times daily      linaCLOtide (LINZESS) 72 MCG CAPS capsule Take 72 mcg by mouth every morning (before breakfast)      loperamide (IMODIUM) 2 MG capsule Take 2 mg by mouth 4 times daily as needed for Diarrhea      HYDROcodone-acetaminophen (NORCO) 7.5-325 MG per tablet Take 1 tablet by mouth every 6 hours as needed for Pain.  metoprolol succinate (TOPROL XL) 25 MG extended release tablet Take 1 tablet by mouth 2 times daily 180 tablet 3    Misc.  Devices MISC Power wheelchair 1 Device 0    Lift Chair MISC by Does not apply route Use daily 1 each 0    traZODone (DESYREL) 100 MG tablet TAKE 1 TABLET BY MOUTH AT NIGHT 90 tablet 3    bumetanide (BUMEX) 1 MG tablet TAKE 1 TABLET BY MOUTH DAILY 90 tablet 3    Cholecalciferol (VITAMIN D3) 1000 units TABS Take 1 tablet by mouth daily 90 tablet 1    fluticasone (FLONASE) 50 MCG/ACT nasal spray 1 spray by Nasal route 2 times daily 3 Bottle 1    B-D UF III MINI PEN NEEDLES 31G X 5 MM MISC USE EVERY DAY AS DIRECTED 100 each 3    simvastatin (ZOCOR) 20 MG tablet TAKE (1) TABLET BY MOUTH NIGHTLY 90 tablet 10    ENTRESTO 24-26 MG per tablet TAKE 1 TABLET BY MOUTH TWICE DAILY 180 tablet 3    isosorbide mononitrate (IMDUR) 60 MG extended release tablet TAKE (1) TABLET BY MOUTH DAILY 90 tablet 3    montelukast (SINGULAIR) 10 MG tablet TAKE 1 TABLET BY MOUTH NIGHTLY 90 tablet 10    DULoxetine (CYMBALTA) 60 MG extended release capsule TAKE 1 CAPSULE BY MOUTH TWICE DAILY 180 capsule 10    clopidogrel (PLAVIX) 75 MG tablet TAKE 1 TABLET BY MOUTH EVERY DAY 90 tablet 1    lidocaine (XYLOCAINE) 5 % ointment APPLY TOPICALLY AS NEEDED FOR PAIN 35.44 g 11    Nutritional Supplements (GLUCOSE MANAGEMENT) TABS Use daily regarding the possible side effects, risks, benefits and alternatives to treatment; patient and/or guardian verbalizes understanding, agrees,feels comfortable with and wishes to proceed with above treatment plan. Advised patient to call [facility] new medication issues, and read all Rx info from pharmacy to assure aware of all possible risks and side effects of medication before taking. Reviewed age and gender appropriate health screening exams and vaccinations. Advised patient regarding importance of keeping up with recommended health maintenance and toschedule as soon as possible if overdue, as this is important in assessing for undiagnosed pathology, especially cancer, as well as protecting against potentially harmful/life threatening disease. Patient and/or guardian verbalizes understanding and agrees with above counseling, assessment and plan. All questions answered. Judith 5, DO  6/5/20    NOTE: This report was transcribed using voice recognition software. Every effort was made to ensure accuracy; however, inadvertent computerized transcription errors may be present    Aide Parker is a 71 y.o. female being evaluated by a Virtual Visit (video visit) encounter to address concerns as mentioned above. A caregiver was present when appropriate. Due to this being a TeleHealth encounter (During DSXKW-12 public health emergency), evaluation of the following organ systems was limited: Vitals/Constitutional/EENT/Resp/CV/GI//MS/Neuro/Skin/Heme-Lymph-Imm. Pursuant to the emergency declaration under the [facility], [address] [place] [address] authority and the [employer] Act, this Virtual Visit was conducted with patient's (and/or legal guardian's) consent, to reduce the patient's risk of exposure to COVID-19 and provide necessary medical care.   The patient (and/or legal guardian) has also been advised to contact this office for worsening conditions or problems, and seek emergency medical treatment and/or call 911 if deemed necessary.

## 2020-06-16 ENCOUNTER — HOSPITAL ENCOUNTER (OUTPATIENT)
Age: 65
Discharge: HOME | End: 2020-06-16
Payer: MEDICARE

## 2020-06-16 ENCOUNTER — HOSPITAL ENCOUNTER (OUTPATIENT)
Dept: CT IMAGING | Age: 65
Discharge: HOME | End: 2020-06-18
Payer: MEDICARE

## 2020-06-16 LAB
ALBUMIN SERPL-MCNC: 4.3 G/DL (ref 3.5–5.2)
ALP BLD-CCNC: 86 U/L (ref 35–104)
ALT SERPL-CCNC: 22 U/L (ref 0–32)
ANION GAP SERPL CALCULATED.3IONS-SCNC: 7 MMOL/L (ref 7–16)
AST SERPL-CCNC: 12 U/L (ref 0–31)
BASOPHILS ABSOLUTE: 0.01 E9/L (ref 0–0.2)
BASOPHILS RELATIVE PERCENT: 0.1 % (ref 0–2)
BILIRUB SERPL-MCNC: 0.4 MG/DL (ref 0–1.2)
BUN BLDV-MCNC: 25 MG/DL (ref 8–23)
CALCIUM SERPL-MCNC: 9.8 MG/DL (ref 8.6–10.2)
CHLORIDE BLD-SCNC: 99 MMOL/L (ref 98–107)
CHOLESTEROL, TOTAL: 162 MG/DL (ref 0–199)
CO2: 29 MMOL/L (ref 22–29)
CREAT SERPL-MCNC: 1.1 MG/DL (ref 0.5–1)
EOSINOPHILS ABSOLUTE: 0 E9/L (ref 0.05–0.5)
EOSINOPHILS RELATIVE PERCENT: 0 % (ref 0–6)
FOLATE: >20 NG/ML (ref 4.8–24.2)
GFR AFRICAN AMERICAN: >60
GFR NON-AFRICAN AMERICAN: >60 ML/MIN/1.73
GLUCOSE BLD-MCNC: 190 MG/DL (ref 74–99)
HBA1C MFR BLD: 6.5 % (ref 4–5.6)
HCT VFR BLD CALC: 46.3 % (ref 34–48)
HDLC SERPL-MCNC: 46 MG/DL
HEMOGLOBIN: 14.9 G/DL (ref 11.5–15.5)
IMMATURE GRANULOCYTES #: 0.02 E9/L
IMMATURE GRANULOCYTES %: 0.2 % (ref 0–5)
LDL CHOLESTEROL CALCULATED: 98 MG/DL (ref 0–99)
LYMPHOCYTES ABSOLUTE: 0.38 E9/L (ref 1.5–4)
LYMPHOCYTES RELATIVE PERCENT: 4 % (ref 20–42)
MCH RBC QN AUTO: 32 PG (ref 26–35)
MCHC RBC AUTO-ENTMCNC: 32.2 % (ref 32–34.5)
MCV RBC AUTO: 99.6 FL (ref 80–99.9)
MONOCYTES ABSOLUTE: 0.09 E9/L (ref 0.1–0.95)
MONOCYTES RELATIVE PERCENT: 0.9 % (ref 2–12)
NEUTROPHILS ABSOLUTE: 9.01 E9/L (ref 1.8–7.3)
NEUTROPHILS RELATIVE PERCENT: 94.8 % (ref 43–80)
PDW BLD-RTO: 12.3 FL (ref 11.5–15)
PLATELET # BLD: 268 E9/L (ref 130–450)
PMV BLD AUTO: 10.9 FL (ref 7–12)
POIKILOCYTES: ABNORMAL
POLYCHROMASIA: ABNORMAL
POTASSIUM SERPL-SCNC: 4.4 MMOL/L (ref 3.5–5)
RBC # BLD: 4.65 E12/L (ref 3.5–5.5)
SEDIMENTATION RATE, ERYTHROCYTE: 45 MM/HR (ref 0–20)
SODIUM BLD-SCNC: 135 MMOL/L (ref 132–146)
STOMATOCYTES: ABNORMAL
TOTAL PROTEIN: 7.8 G/DL (ref 6.4–8.3)
TRIGL SERPL-MCNC: 89 MG/DL (ref 0–149)
TSH SERPL DL<=0.05 MIU/L-ACNC: 0.4 UIU/ML (ref 0.27–4.2)
VITAMIN B-12: 1943 PG/ML (ref 211–946)
VITAMIN D 25-HYDROXY: 33 NG/ML (ref 30–100)
VLDLC SERPL CALC-MCNC: 18 MG/DL
WBC # BLD: 9.5 E9/L (ref 4.5–11.5)

## 2020-06-16 PROCEDURE — 36415 COLL VENOUS BLD VENIPUNCTURE: CPT

## 2020-06-16 PROCEDURE — 83036 HEMOGLOBIN GLYCOSYLATED A1C: CPT

## 2020-06-16 PROCEDURE — 85651 RBC SED RATE NONAUTOMATED: CPT

## 2020-06-16 PROCEDURE — 82378 CARCINOEMBRYONIC ANTIGEN: CPT

## 2020-06-16 PROCEDURE — 6360000004 HC RX CONTRAST MEDICATION: Performed by: RADIOLOGY

## 2020-06-16 PROCEDURE — 82306 VITAMIN D 25 HYDROXY: CPT

## 2020-06-16 PROCEDURE — 82746 ASSAY OF FOLIC ACID SERUM: CPT

## 2020-06-16 PROCEDURE — 2580000003 HC RX 258: Performed by: RADIOLOGY

## 2020-06-16 PROCEDURE — 86140 C-REACTIVE PROTEIN: CPT

## 2020-06-16 PROCEDURE — 80061 LIPID PANEL: CPT

## 2020-06-16 PROCEDURE — 84443 ASSAY THYROID STIM HORMONE: CPT

## 2020-06-16 PROCEDURE — 74177 CT ABD & PELVIS W/CONTRAST: CPT

## 2020-06-16 PROCEDURE — 80053 COMPREHEN METABOLIC PANEL: CPT

## 2020-06-16 PROCEDURE — 85025 COMPLETE CBC W/AUTO DIFF WBC: CPT

## 2020-06-16 PROCEDURE — 82607 VITAMIN B-12: CPT

## 2020-06-16 RX ORDER — SODIUM CHLORIDE 0.9 % (FLUSH) 0.9 %
10 SYRINGE (ML) INJECTION PRN
Status: DISCONTINUED | OUTPATIENT
Start: 2020-06-16 | End: 2020-06-19 | Stop reason: HOSPADM

## 2020-06-16 RX ADMIN — SODIUM CHLORIDE, PRESERVATIVE FREE 10 ML: 5 INJECTION INTRAVENOUS at 15:08

## 2020-06-16 RX ADMIN — IOHEXOL 50 ML: 240 INJECTION, SOLUTION INTRATHECAL; INTRAVASCULAR; INTRAVENOUS; ORAL at 15:08

## 2020-06-16 RX ADMIN — IOPAMIDOL 110 ML: 755 INJECTION, SOLUTION INTRAVENOUS at 15:08

## 2020-06-18 LAB
C-REACTIVE PROTEIN: 0.6 MG/DL (ref 0–0.4)
CEA: 5.1 NG/ML (ref 0–5.2)

## 2020-06-19 ENCOUNTER — TELEPHONE (OUTPATIENT)
Dept: FAMILY MEDICINE CLINIC | Age: 65
End: 2020-06-19

## 2020-07-01 ENCOUNTER — TELEPHONE (OUTPATIENT)
Dept: FAMILY MEDICINE CLINIC | Age: 65
End: 2020-07-01

## 2020-07-02 NOTE — TELEPHONE ENCOUNTER
Needs evaluated recommend urgent care or ED.  Recommend to try calling her GI doc again today if her stomach is hurting that bad

## 2020-07-02 NOTE — TELEPHONE ENCOUNTER
Left message with instruction for patient but also asked if she could call the office to let us know that she got the message and let us know what she plans on doing

## 2020-07-17 ENCOUNTER — VIRTUAL VISIT (OUTPATIENT)
Dept: FAMILY MEDICINE CLINIC | Age: 65
End: 2020-07-17
Payer: MEDICARE

## 2020-07-17 PROCEDURE — 3044F HG A1C LEVEL LT 7.0%: CPT | Performed by: FAMILY MEDICINE

## 2020-07-17 PROCEDURE — 1090F PRES/ABSN URINE INCON ASSESS: CPT | Performed by: FAMILY MEDICINE

## 2020-07-17 PROCEDURE — 99214 OFFICE O/P EST MOD 30 MIN: CPT | Performed by: FAMILY MEDICINE

## 2020-07-17 PROCEDURE — G8427 DOCREV CUR MEDS BY ELIG CLIN: HCPCS | Performed by: FAMILY MEDICINE

## 2020-07-17 PROCEDURE — 4040F PNEUMOC VAC/ADMIN/RCVD: CPT | Performed by: FAMILY MEDICINE

## 2020-07-17 PROCEDURE — 3017F COLORECTAL CA SCREEN DOC REV: CPT | Performed by: FAMILY MEDICINE

## 2020-07-17 PROCEDURE — 2022F DILAT RTA XM EVC RTNOPTHY: CPT | Performed by: FAMILY MEDICINE

## 2020-07-17 PROCEDURE — G8399 PT W/DXA RESULTS DOCUMENT: HCPCS | Performed by: FAMILY MEDICINE

## 2020-07-17 PROCEDURE — 1123F ACP DISCUSS/DSCN MKR DOCD: CPT | Performed by: FAMILY MEDICINE

## 2020-07-17 RX ORDER — CLOPIDOGREL BISULFATE 75 MG/1
TABLET ORAL
Qty: 90 TABLET | Refills: 1 | Status: SHIPPED
Start: 2020-07-17 | End: 2020-09-17 | Stop reason: ALTCHOICE

## 2020-07-17 RX ORDER — TIOTROPIUM BROMIDE INHALATION SPRAY 1.56 UG/1
SPRAY, METERED RESPIRATORY (INHALATION)
COMMUNITY
Start: 2020-06-22 | End: 2020-11-24

## 2020-07-17 RX ORDER — ASPIRIN 81 MG/1
TABLET, CHEWABLE ORAL
Qty: 90 TABLET | Refills: 1 | Status: SHIPPED
Start: 2020-07-17 | End: 2021-01-05

## 2020-07-17 RX ORDER — PANTOPRAZOLE SODIUM 40 MG/1
TABLET, DELAYED RELEASE ORAL
Qty: 30 TABLET | Refills: 2 | Status: SHIPPED
Start: 2020-07-17 | End: 2021-04-19

## 2020-07-17 RX ORDER — FLUTICASONE PROPIONATE 50 MCG
1 SPRAY, SUSPENSION (ML) NASAL 2 TIMES DAILY
Qty: 3 BOTTLE | Refills: 1 | Status: SHIPPED
Start: 2020-07-17 | End: 2021-01-05

## 2020-07-17 RX ORDER — LIDOCAINE 50 MG/G
OINTMENT TOPICAL
Qty: 35.44 G | Refills: 11 | Status: SHIPPED
Start: 2020-07-17 | End: 2021-04-20

## 2020-07-17 RX ORDER — MELATONIN
1000 DAILY
Qty: 90 TABLET | Refills: 1 | Status: SHIPPED
Start: 2020-07-17 | End: 2020-11-24

## 2020-07-17 RX ORDER — PREDNISONE 50 MG/1
TABLET ORAL
COMMUNITY
Start: 2020-05-20 | End: 2020-07-17

## 2020-07-17 RX ORDER — MELOXICAM 7.5 MG/1
TABLET ORAL
Qty: 90 TABLET | Refills: 1 | Status: SHIPPED
Start: 2020-07-17 | End: 2021-02-18

## 2020-07-17 RX ORDER — NITROGLYCERIN 0.4 MG/1
TABLET SUBLINGUAL
Qty: 25 TABLET | Refills: 3 | Status: ON HOLD
Start: 2020-07-17 | End: 2021-03-13

## 2020-07-17 RX ORDER — DICYCLOMINE HYDROCHLORIDE 10 MG/1
CAPSULE ORAL
COMMUNITY
Start: 2020-06-22 | End: 2021-04-19

## 2020-07-17 NOTE — PROGRESS NOTES
This visit was performed as a virtual video visit using a synchronous, two-way, audio-video telehealth technology platform. Patient identification was verified at the start of the visit, including the patient's telephone number and physical location. I discussed with the patient the nature of our telehealth visits, that:     1. Due to the nature of an audio- video modality, the only components of a physical exam that could be done are the elements supported by direct observation. 2. I would evaluate the patient and recommend diagnostics and treatments based on my assessment. 3. If it was felt that the patient should be evaluated in clinic or an emergency room setting, then they would be directed there. 4. Our sessions are not being recorded and that personal health information is protected. 5. Our team would provide follow up care in person if/when the patient needs it. Patient does agree to proceed with telemedicine consultation. Patient's location: home address in [facility]    Physician  location [place] [id number] office   other people involved in call: none        Time spent: Greater than Not billed by time    This visit was completed virtually using Doxy. me        Efren Garcia  : 1955    Chief Complaint:     Chief Complaint   Patient presents with    Cough    Incontinence       HPI  Efren Garcia 71 y.o. presents for   Chief Complaint   Patient presents with    Cough    Incontinence     Patient continues to complain of incontinence especially with coughing. She has not been back to see the urologist. She is currently taking myrbetriq. She continues to smoke both cigarettes and marijuana. She has not quit. She does follow with pulmonology but has not seen them recently. She has been using. She continues to complain of dyspnea. She has not had an echo in some time. Her sugars have been high in the 200s recently. She has been watching her diet.  She is due for mammogram.     All questions were answered to patients satisfaction. Past Medical History:   Diagnosis Date    Asthma     Atherosclerosis of native artery of left leg with rest pain ([address] 11/9/2018    C. difficile diarrhea 2015    CAD (coronary artery disease) 2011    Cellulitis and abscess of trunk 4/14/2015    Cerebellar infarct ([address] 4/3/2019    Remote, rt. cerebellum, head CT scan, 1/9/19    Chronic back pain     Chronic kidney disease     Chronic systolic congestive heart failure ([address] 10/4/2013    Chronic venous insufficiency 10/11/2017    COPD (chronic obstructive pulmonary disease) ([address]     Depression     Diabetes mellitus ([address]     Diabetic neuropathy ([address]     Diverticulosis     Fatty liver 1/8/2016    per US    Glaucoma, open angle 2/1/2016    Mild-OU    Hepatic encephalopathy ([address] 02/07/2016    resolved    Hiatal hernia     History of blood transfusion     Hyperlipidemia     Hyperplastic colon polyp     Hypertension     Incontinence     Liver mass     Morbid obesity ([address]     Movement disorder     Myocardial infarction ([address] 2011    Osteoarthritis, generalized     Pain in both lower legs 10/11/2017    Peripheral vascular disease ([address]     Pneumonia 1/26/2014    Pulmonary edema     resolved    PVD (peripheral vascular disease) with claudication ([address] 8/30/2017    Sleep apnea     uses BI PAP    Status post peripheral artery angioplasty 10/31/2019    Tobacco abuse     Tobacco abuse 10/11/2017    Tubular adenoma polyp of rectum     Urinary tract infection due to ESBL Klebsiella 03/08/2017    Ventral hernia        Past Surgical History:   Procedure Laterality Date    ANGIOPLASTY  11/13/2018    Dr. Yip & athrectomy L SFA & Popliteal    BREAST SURGERY  2000    bilateral reduction    BRONCHIAL BRUSH BIOPSY  1/25/2013    Dr Sinan Kumar  04/20/2015    Dr. Nicole Villarreal  12/2011     DR. Wei Kenny,  follows Dr Linnie Boxer OPEN    COLONOSCOPY  11/15/2013    Dr Marion Cowden    COLONOSCOPY  12/23/2016    Dr Brandon Knight adenoma & hyperplastic polyps, melanosis coli (repeat one year 12/2017)    CORONARY ARTERY BYPASS GRAFT      ECHO COMPLETE  10/9/2013         ENDOSCOPY, COLON, DIAGNOSTIC  04/18/2017    GALLBLADDER SURGERY      gallstones removed, [facility] 8/2017    HYSTERECTOMY      JOINT REPLACEMENT  2011    LEFT KNEE    KNEE SURGERY      left knee replacement    LAYER WOUND CLOSURE Left [id number]    LIVER BIOPSY  1/8/2016    [facility]    POLYSOMNOGRAPHY  1/2016    [facility]    UPPER GASTROINTESTINAL ENDOSCOPY  12/20/12    UPPER GASTROINTESTINAL ENDOSCOPY  12/23/2016    Dr Analisa Curry UPPER GASTROINTESTINAL ENDOSCOPY  02/08/2017       Social History     Socioeconomic History    Marital status:       Spouse name: None    Number of children: 2    Years of education: None    Highest education level: None   Occupational History    Occupation: disability   Social Needs    Financial resource strain: None    Food insecurity     Worry: None     Inability: None    Transportation needs     Medical: None     Non-medical: None   Tobacco Use    Smoking status: Current Every Day Smoker     Packs/day: 0.25     Years: 40.00     Pack years: 10.00     Types: Cigarettes     Start date: 8/10/1974    Smokeless tobacco: Never Used    Tobacco comment: 1 pack every 3 days (6-7 cig)   Substance and Sexual Activity    Alcohol use: No     Alcohol/week: 0.0 standard drinks    Drug use: Yes     Types: Marijuana     Comment: \"every 4th or 5th day out of the week\"    Sexual activity: Never   Lifestyle    Physical activity     Days per week: None     Minutes per session: None    Stress: None   Relationships    Social connections     Talks on phone: None     Gets together: None     Attends Mosque service: None     Active member of club or organization: None     Attends meetings of clubs or organizations: None     Relationship status: None  Intimate partner violence     Fear of current or ex partner: None     Emotionally abused: None     Physically abused: None     Forced sexual activity: None   Other Topics Concern    None   Social History Narrative    Pt lives with brother in her house-pt has never driven a car and depends on transportation       Family History   Problem Relation Age of Onset    Cancer Mother     Asthma Father           Current Outpatient Medications   Medication Sig Dispense Refill    aspirin (ASPIRIN LOW DOSE) 81 MG chewable tablet TAKE 1 TABLET BY MOUTH EVERY DAY 90 tablet 1    clopidogrel (PLAVIX) 75 MG tablet TAKE 1 TABLET BY MOUTH EVERY DAY 90 tablet 1    vitamin D3 (CHOLECALCIFEROL) 25 MCG (1000 UT) TABS tablet Take 1 tablet by mouth daily 90 tablet 1    guaiFENesin (ROBITUSSIN) 100 MG/5ML liquid Take 20 mLs by mouth 3 times daily as needed for Cough 500 mL 2    fluticasone (FLONASE) 50 MCG/ACT nasal spray 1 spray by Nasal route 2 times daily 3 Bottle 1    lidocaine (XYLOCAINE) 5 % ointment APPLY TOPICALLY AS NEEDED FOR PAIN 35.44 g 11    meloxicam (MOBIC) 7.5 MG tablet TAKE 1 TABLET BY MOUTH EVERY DAY 90 tablet 1    nitroGLYCERIN (NITROSTAT) 0.4 MG SL tablet up to max of 3 total doses. If no relief after 1 dose, call 911. 25 tablet 3    pantoprazole (PROTONIX) 40 MG tablet TAKE 1 TABLET BY MOUTH TWICE A DAY 30 tablet 2    MYRBETRIQ 50 MG TB24 TAKE 1 TABLET BY MOUTH EVERY DAY 90 tablet 1    cetirizine (ZYRTEC) 10 MG tablet TAKE 1 TABLET BY MOUTH EVERY DAY 90 tablet 1    baclofen (LIORESAL) 10 MG tablet TAKE 1 TABLET BY MOUTH TWICE A DAY AS NEEDED 180 tablet 1    Alcohol Swabs ([employer] ALCOHOL PREP SWABS) 70 % PADS Use daily 1 each 2    Blood Glucose Monitoring Suppl (ACCU-CHEK GUIDE ME) w/Device KIT Use as directed 1 kit 0    blood glucose test strips (ACCU-CHEK GUIDE) strip 1 each by In Vitro route daily As needed.  100 each 3    SOFT TOUCH LANCETS MISC Use 3 x daily 100 each 3    hydrALAZINE (APRESOLINE) 50 MG tablet Take 1 tablet by mouth 3 times daily 270 tablet 3    Insulin Degludec (TRESIBA FLEXTOUCH) 200 UNIT/ML SOPN Inject 40 units into skin nightly 5 pen 2    Accu-Chek FastClix Lancets MISC Use 4x daily 100 each 2    Insulin Syringe-Needle U-100 31G X 5/16\" 1 ML MISC 1 each by Does not apply route daily Use as directed 100 each 11    Insulin Pen Needle (B-D UF III MINI PEN NEEDLES) 31G X 5 MM MISC USE EVERY DAY AS DIRECTED 100 each 3    ketorolac (ACULAR) 0.5 % ophthalmic solution 1 drop 4 times daily      linaCLOtide (LINZESS) 72 MCG CAPS capsule Take 72 mcg by mouth every morning (before breakfast)      loperamide (IMODIUM) 2 MG capsule Take 2 mg by mouth 4 times daily as needed for Diarrhea      HYDROcodone-acetaminophen (NORCO) 7.5-325 MG per tablet Take 1 tablet by mouth every 6 hours as needed for Pain.  metoprolol succinate (TOPROL XL) 25 MG extended release tablet Take 1 tablet by mouth 2 times daily 180 tablet 3    Misc. Devices MISC Power wheelchair 1 Device 0    Lift Chair MISC by Does not apply route Use daily 1 each 0    bumetanide (BUMEX) 1 MG tablet TAKE 1 TABLET BY MOUTH DAILY 90 tablet 3    B-D UF III MINI PEN NEEDLES 31G X 5 MM MISC USE EVERY DAY AS DIRECTED 100 each 3    simvastatin (ZOCOR) 20 MG tablet TAKE (1) TABLET BY MOUTH NIGHTLY 90 tablet 10    ENTRESTO 24-26 MG per tablet TAKE 1 TABLET BY MOUTH TWICE DAILY 180 tablet 3    isosorbide mononitrate (IMDUR) 60 MG extended release tablet TAKE (1) TABLET BY MOUTH DAILY 90 tablet 3    montelukast (SINGULAIR) 10 MG tablet TAKE 1 TABLET BY MOUTH NIGHTLY 90 tablet 10    DULoxetine (CYMBALTA) 60 MG extended release capsule TAKE 1 CAPSULE BY MOUTH TWICE DAILY 180 capsule 10    Nutritional Supplements (GLUCOSE MANAGEMENT) TABS Use daily prn if low glucose <60 30 tablet 2    hydrocortisone 2.5 % cream APPLY TO AFFECTED AREA TWICE DAILY 60 g 2    gabapentin (NEURONTIN) 300 MG capsule Take 600 mg by mouth 3 times daily. Lidia London  olopatadine (PATADAY) 0.2 % SOLN ophthalmic solution Place 1 drop into both eyes daily      Multiple Vitamins-Minerals (THERAPEUTIC MULTIVITAMIN-MINERALS) tablet Take 1 tablet by mouth daily      albuterol (PROVENTIL) (2.5 MG/3ML) 0.083% nebulizer solution Take 2.5 mg by nebulization every 6 hours as needed for Wheezing      BiPAP Machine MISC by Does not apply route nightly 27/23      SPIRIVA RESPIMAT 1.25 MCG/ACT AERS inhaler INHALE TWO (2) PUFFS BY MOUTH EVERY DAY      dicyclomine (BENTYL) 10 MG capsule TAKE 1 CAPSULE BY MOUTH TWICE A DAY      econazole nitrate 1 % cream Apply topically daily Apply topically daily. No current facility-administered medications for this visit.         Allergies   Allergen Reactions    Latex Hives    Bee Venom Anaphylaxis    Dilaudid [Hydromorphone Hcl] Itching    Dye [Iodides] Hives and Shortness Of Breath    Percocet [Oxycodone-Acetaminophen] Shortness Of Breath and Itching    Keflex [Cephalexin] Itching and Rash    Lasix [Furosemide] Other (See Comments)     Pt states she cramps up and gets headaches    Levaquin [Levofloxacin In D5w] Hives    Lipitor      MUSCLE SPASMS    Lyrica [Pregabalin]      Dream disturbances    Morphine Hives    Naproxen      Unsure of reaction;pt states able to take Aleve without difficulties    Nefazodone Other (See Comments)    Norvasc [Amlodipine] Swelling    Oxycodone-Acetaminophen Swelling    Shellfish-Derived Products     Trazodone And Nefazodone        Health Maintenance Due   Topic Date Due    Hepatitis A vaccine (1 of 2 - Risk 2-dose series) 12/29/1956    DTaP/Tdap/Td vaccine (1 - Tdap) 12/29/1973    Shingles Vaccine (1 of 2) 12/29/2004    Cervical cancer screen  09/16/2016    Breast cancer screen  11/10/2018    Diabetic microalbuminuria test  05/09/2019    Colon cancer screen colonoscopy  10/31/2019    Diabetic foot exam  11/08/2019    Pneumococcal 65+ years Vaccine (1 of 1 - PPSV23) 12/29/2019    Annual Wellness Visit (AWV)  04/19/2020    Diabetic retinal exam  07/15/2020           REVIEW OF SYSTEMS  Review of Systems   Constitutional: Positive for fatigue. Negative for chills and fever. Uses walker   HENT: Positive for postnasal drip. Negative for congestion, ear pain, rhinorrhea, sinus pressure, sinus pain, sneezing, sore throat and trouble swallowing. Eyes: Negative for pain. Respiratory: Positive for cough and shortness of breath. Mucus production   Cardiovascular: Negative for chest pain and leg swelling. Gastrointestinal: Positive for abdominal pain. Negative for constipation, diarrhea, nausea, rectal pain and vomiting. Endocrine: Negative for cold intolerance and heat intolerance. Genitourinary: Negative for dysuria, frequency and urgency. Musculoskeletal: Positive for arthralgias, gait problem and neck pain. Negative for back pain and myalgias. Skin: Negative for rash. Allergic/Immunologic: Positive for environmental allergies. Negative for food allergies. Neurological: Negative for dizziness, tremors, syncope, light-headedness and headaches. Hematological: Negative for adenopathy. Does not bruise/bleed easily. Psychiatric/Behavioral: Positive for sleep disturbance. Negative for behavioral problems and dysphoric mood. The patient is not nervous/anxious. PHYSICAL EXAM  LMP 01/01/1990   Physical Exam  Vitals signs reviewed. Constitutional:       Appearance: Normal appearance. She is obese. HENT:      Head: Normocephalic. Nose: Nose normal.   Eyes:      Extraocular Movements: Extraocular movements intact. Conjunctiva/sclera: Conjunctivae normal.   Neck:      Musculoskeletal: Normal range of motion. Pulmonary:      Effort: Pulmonary effort is normal.   Musculoskeletal: Normal range of motion. Skin:     Findings: No rash. Neurological:      General: No focal deficit present. Mental Status: She is alert and oriented to person, place, and time. Mental status is at baseline. Psychiatric:         Mood and Affect: Mood normal.         Behavior: Behavior normal.         Thought Content: Thought content normal.         Judgment: Judgment normal.              Laboratory: All laboratory and radiology results have been personally reviewed by myself    Lab Results   Component Value Date     06/16/2020    K 4.4 06/16/2020    K 5.4 09/20/2019    CL 99 06/16/2020    CO2 29 06/16/2020    BUN 25 06/16/2020    CREATININE 1.1 06/16/2020    PROT 7.8 06/16/2020    LABALBU 4.3 06/16/2020    LABALBU 5.2 12/01/2011    CALCIUM 9.8 06/16/2020    GFRAA >60 06/16/2020    LABGLOM >60 06/16/2020    GLUCOSE 190 06/16/2020    GLUCOSE 181 12/01/2011    AST 12 06/16/2020    ALT 22 06/16/2020    ALKPHOS 86 06/16/2020    BILITOT 0.4 06/16/2020    TSH 0.396 06/16/2020    VITD25 33 06/16/2020    CHOL 162 06/16/2020    TRIG 89 06/16/2020    HDL 46 06/16/2020    LDLCALC 98 06/16/2020    LABA1C 6.5 06/16/2020        Lab Results   Component Value Date    CHOL 162 06/16/2020    CHOL 222 (H) 06/05/2018    CHOL 159 12/01/2016     Lab Results   Component Value Date    TRIG 89 06/16/2020    TRIG 84 06/05/2018    TRIG 215 (H) 12/01/2016     Lab Results   Component Value Date    HDL 46 06/16/2020    HDL 38 06/05/2018    HDL 27 12/01/2016     Lab Results   Component Value Date    LDLCALC 98 06/16/2020    LDLCALC 167 (H) 06/05/2018    LDLCALC 89 12/01/2016       Lab Results   Component Value Date    LABA1C 6.5 (H) 06/16/2020    LABA1C 6.5 01/14/2020    LABA1C 5.8 06/26/2019     Lab Results   Component Value Date    LABMICR 82.0 (H) 12/01/2016    LDLCALC 98 06/16/2020    CREATININE 1.1 (H) 06/16/2020       ASSESSMENT/PLAN:     Diagnosis Orders   1. Dyspnea, unspecified type  ECHO LIMITED   2. Chronic respiratory failure with hypoxia and hypercapnia (HCC)     3. DAYAN and COPD overlap syndrome ([address]     4. Chronic combined systolic and diastolic heart failure ([facility]     5.  Type 2 diabetes mellitus

## 2020-07-20 ASSESSMENT — ENCOUNTER SYMPTOMS
SINUS PRESSURE: 0
SORE THROAT: 0
VOMITING: 0
COUGH: 1
SINUS PAIN: 0
DIARRHEA: 0
BACK PAIN: 0
ABDOMINAL PAIN: 1
EYE PAIN: 0
SHORTNESS OF BREATH: 1
RHINORRHEA: 0
RECTAL PAIN: 0
NAUSEA: 0
TROUBLE SWALLOWING: 0
CONSTIPATION: 0

## 2020-07-28 ENCOUNTER — TELEPHONE (OUTPATIENT)
Dept: FAMILY MEDICINE CLINIC | Age: 65
End: 2020-07-28

## 2020-07-28 NOTE — TELEPHONE ENCOUNTER
Pt called states cough remains, runny nose, watery eyes seems to be getting worse can she get a RX for allergies to help her

## 2020-07-30 RX ORDER — METHYLPREDNISOLONE 4 MG/1
TABLET ORAL
Qty: 1 KIT | Refills: 0 | Status: SHIPPED | OUTPATIENT
Start: 2020-07-30 | End: 2020-08-05

## 2020-08-05 ENCOUNTER — TELEPHONE (OUTPATIENT)
Dept: FAMILY MEDICINE CLINIC | Age: 65
End: 2020-08-05

## 2020-08-05 RX ORDER — INSULIN DEGLUDEC 200 U/ML
INJECTION, SOLUTION SUBCUTANEOUS
Qty: 10 PEN | Refills: 2 | Status: CANCELLED | OUTPATIENT
Start: 2020-08-05

## 2020-08-07 ENCOUNTER — TELEPHONE (OUTPATIENT)
Dept: FAMILY MEDICINE CLINIC | Age: 65
End: 2020-08-07

## 2020-08-07 RX ORDER — FLUCONAZOLE 150 MG/1
150 TABLET ORAL ONCE
Qty: 2 TABLET | Refills: 0 | Status: SHIPPED | OUTPATIENT
Start: 2020-08-07 | End: 2020-08-07

## 2020-08-07 NOTE — TELEPHONE ENCOUNTER
Patient called stated she stated she takes tresiba 60 units BID , and she wanted PCP to know that she also has no appetite

## 2020-08-17 ENCOUNTER — TELEPHONE (OUTPATIENT)
Dept: FAMILY MEDICINE CLINIC | Age: 65
End: 2020-08-17

## 2020-08-17 NOTE — TELEPHONE ENCOUNTER
Pt calling and states she thought she was to do her Tresiba bid and not qd. Please verify with pt.  Also she tests her sugars tid and will need more test strips

## 2020-08-18 RX ORDER — BLOOD SUGAR DIAGNOSTIC
1 STRIP MISCELLANEOUS 3 TIMES DAILY
Qty: 300 EACH | Refills: 11 | Status: SHIPPED
Start: 2020-08-18 | End: 2020-12-28 | Stop reason: SDUPTHER

## 2020-08-18 NOTE — TELEPHONE ENCOUNTER
Pt called and said they will not fill her script for the test strips due to a missing ICD-10 code.  Please advise

## 2020-08-19 ENCOUNTER — TELEPHONE (OUTPATIENT)
Dept: FAMILY MEDICINE CLINIC | Age: 65
End: 2020-08-19

## 2020-08-19 RX ORDER — INSULIN DEGLUDEC 200 U/ML
INJECTION, SOLUTION SUBCUTANEOUS
Qty: 10 PEN | Refills: 2 | Status: SHIPPED
Start: 2020-08-19 | End: 2021-01-12

## 2020-08-19 RX ORDER — BLOOD SUGAR DIAGNOSTIC
1 STRIP MISCELLANEOUS 3 TIMES DAILY
Qty: 300 EACH | Refills: 11 | Status: CANCELLED | OUTPATIENT
Start: 2020-08-19

## 2020-08-19 NOTE — TELEPHONE ENCOUNTER
Pt called today states he only received 3 pens of Treseba  from pharmacy:    The directions sent were 50 units HS    The pt has been doing 60 units BID    The pt is scheduled 9-8-2020    Need to send new RX with correct directions so pt can get more med and not be short for the month.   Thanks

## 2020-08-21 ENCOUNTER — HOSPITAL ENCOUNTER (OUTPATIENT)
Age: 65
Discharge: HOME | End: 2020-08-23

## 2020-08-21 PROCEDURE — 88305 TISSUE EXAM BY PATHOLOGIST: CPT

## 2020-08-21 PROCEDURE — 87081 CULTURE SCREEN ONLY: CPT

## 2020-08-22 LAB — CLOTEST: NORMAL

## 2020-08-26 ENCOUNTER — TELEPHONE (OUTPATIENT)
Dept: CARDIOLOGY | Age: 65
End: 2020-08-26

## 2020-08-27 ENCOUNTER — TELEPHONE (OUTPATIENT)
Dept: CARDIOLOGY | Age: 65
End: 2020-08-27

## 2020-08-27 NOTE — TELEPHONE ENCOUNTER
PATIENT WAS SCHEDULED THIS AFTERNOON FOR ECHO. PATIENT WAS A NO SHOW. WASN'T ABLE TO LEAVE A MESSAGE DUE TO NO VOICE MAIL.     Electronically signed by Aleida Upton on 8/27/2020 at 2:52 PM

## 2020-08-28 ENCOUNTER — TELEPHONE (OUTPATIENT)
Dept: CARDIOLOGY | Age: 65
End: 2020-08-28

## 2020-08-28 NOTE — TELEPHONE ENCOUNTER
CALLED PATIENT AND LEFT MESSAGE TO SCHEDULE AN ECHO THAT THE PATIENT HAD MISSED ON 08-27-20 WAS A NO SHOW.     Electronically signed by Queenie Butelr on 8/28/2020 at 9:50 AM

## 2020-09-08 ENCOUNTER — OFFICE VISIT (OUTPATIENT)
Dept: FAMILY MEDICINE CLINIC | Age: 65
End: 2020-09-08
Payer: MEDICARE

## 2020-09-08 VITALS
SYSTOLIC BLOOD PRESSURE: 136 MMHG | HEART RATE: 109 BPM | WEIGHT: 228 LBS | BODY MASS INDEX: 41.96 KG/M2 | RESPIRATION RATE: 24 BRPM | HEIGHT: 62 IN | DIASTOLIC BLOOD PRESSURE: 72 MMHG | OXYGEN SATURATION: 95 % | TEMPERATURE: 96.8 F

## 2020-09-08 PROCEDURE — 2022F DILAT RTA XM EVC RTNOPTHY: CPT | Performed by: FAMILY MEDICINE

## 2020-09-08 PROCEDURE — G8427 DOCREV CUR MEDS BY ELIG CLIN: HCPCS | Performed by: FAMILY MEDICINE

## 2020-09-08 PROCEDURE — 90732 PPSV23 VACC 2 YRS+ SUBQ/IM: CPT | Performed by: FAMILY MEDICINE

## 2020-09-08 PROCEDURE — 3044F HG A1C LEVEL LT 7.0%: CPT | Performed by: FAMILY MEDICINE

## 2020-09-08 PROCEDURE — 4040F PNEUMOC VAC/ADMIN/RCVD: CPT | Performed by: FAMILY MEDICINE

## 2020-09-08 PROCEDURE — 99214 OFFICE O/P EST MOD 30 MIN: CPT | Performed by: FAMILY MEDICINE

## 2020-09-08 PROCEDURE — G0009 ADMIN PNEUMOCOCCAL VACCINE: HCPCS | Performed by: FAMILY MEDICINE

## 2020-09-08 PROCEDURE — 3017F COLORECTAL CA SCREEN DOC REV: CPT | Performed by: FAMILY MEDICINE

## 2020-09-08 PROCEDURE — G8417 CALC BMI ABV UP PARAM F/U: HCPCS | Performed by: FAMILY MEDICINE

## 2020-09-08 PROCEDURE — 4004F PT TOBACCO SCREEN RCVD TLK: CPT | Performed by: FAMILY MEDICINE

## 2020-09-08 PROCEDURE — G8926 SPIRO NO PERF OR DOC: HCPCS | Performed by: FAMILY MEDICINE

## 2020-09-08 PROCEDURE — 1090F PRES/ABSN URINE INCON ASSESS: CPT | Performed by: FAMILY MEDICINE

## 2020-09-08 PROCEDURE — G8399 PT W/DXA RESULTS DOCUMENT: HCPCS | Performed by: FAMILY MEDICINE

## 2020-09-08 PROCEDURE — 3023F SPIROM DOC REV: CPT | Performed by: FAMILY MEDICINE

## 2020-09-08 PROCEDURE — 1123F ACP DISCUSS/DSCN MKR DOCD: CPT | Performed by: FAMILY MEDICINE

## 2020-09-08 RX ORDER — AMOXICILLIN 500 MG/1
500 CAPSULE ORAL 3 TIMES DAILY
Qty: 21 CAPSULE | Refills: 0 | Status: SHIPPED | OUTPATIENT
Start: 2020-09-08 | End: 2020-09-15

## 2020-09-08 RX ORDER — ONDANSETRON 4 MG/1
TABLET, FILM COATED ORAL
COMMUNITY
Start: 2020-09-01 | End: 2021-05-05 | Stop reason: ALTCHOICE

## 2020-09-08 RX ORDER — SIMVASTATIN 20 MG
TABLET ORAL
Qty: 90 TABLET | Refills: 10 | Status: CANCELLED | OUTPATIENT
Start: 2020-09-08

## 2020-09-08 RX ORDER — CETIRIZINE HYDROCHLORIDE 10 MG/1
TABLET ORAL
Qty: 90 TABLET | Refills: 1 | Status: CANCELLED | OUTPATIENT
Start: 2020-09-08

## 2020-09-08 RX ORDER — MONTELUKAST SODIUM 10 MG/1
10 TABLET ORAL NIGHTLY
Qty: 90 TABLET | Refills: 10 | Status: CANCELLED | OUTPATIENT
Start: 2020-09-08

## 2020-09-08 RX ORDER — FLUTICASONE PROPIONATE 50 MCG
1 SPRAY, SUSPENSION (ML) NASAL 2 TIMES DAILY
Qty: 3 BOTTLE | Refills: 1 | Status: CANCELLED | OUTPATIENT
Start: 2020-09-08

## 2020-09-08 RX ORDER — DULOXETIN HYDROCHLORIDE 60 MG/1
CAPSULE, DELAYED RELEASE ORAL
Qty: 180 CAPSULE | Refills: 10 | Status: CANCELLED | OUTPATIENT
Start: 2020-09-08

## 2020-09-08 RX ORDER — BUMETANIDE 1 MG/1
1 TABLET ORAL DAILY
Qty: 90 TABLET | Refills: 3 | Status: CANCELLED | OUTPATIENT
Start: 2020-09-08

## 2020-09-08 RX ORDER — TRAZODONE HYDROCHLORIDE 100 MG/1
TABLET ORAL
COMMUNITY
Start: 2020-08-25 | End: 2020-10-13

## 2020-09-08 RX ORDER — METOPROLOL SUCCINATE 25 MG/1
25 TABLET, EXTENDED RELEASE ORAL 2 TIMES DAILY
Qty: 180 TABLET | Refills: 3 | Status: CANCELLED | OUTPATIENT
Start: 2020-09-08

## 2020-09-08 RX ORDER — BACLOFEN 10 MG/1
TABLET ORAL
Qty: 180 TABLET | Refills: 1 | Status: CANCELLED | OUTPATIENT
Start: 2020-09-08

## 2020-09-08 RX ORDER — OXYBUTYNIN CHLORIDE 10 MG/1
TABLET, EXTENDED RELEASE ORAL
COMMUNITY
Start: 2020-08-25 | End: 2021-04-15 | Stop reason: ALTCHOICE

## 2020-09-08 RX ORDER — BENZONATATE 100 MG/1
100 CAPSULE ORAL 3 TIMES DAILY PRN
Qty: 30 CAPSULE | Refills: 0 | Status: SHIPPED | OUTPATIENT
Start: 2020-09-08 | End: 2020-09-15

## 2020-09-08 NOTE — PROGRESS NOTES
Jon Riddle  : 1955    Chief Complaint:     Chief Complaint   Patient presents with    Diabetes    Migraine       HPI  Jon Riddle 71 y.o. presents for   Chief Complaint   Patient presents with    Diabetes    Migraine     Patient presents for follow-up today. She continues to complain of dyspnea and a cough. She continues to smoke both marijuana and cigarettes. She does not wish to quit. She also complains of congestion. She is on multiple medications for allergies. She has not been back to see her pulmonologist.  She also continues to have issues with incontinence because of the cough. He also admits to sinus pressure and pain. She states she is getting dental work done does need amoxicillin before this appointment next week. She wishes for [facility] as this does help with her cough. She has not gained any weight recently. Her blood pressure has been well controlled. She is anxious about her sugars as they have been running in the higher 100s. She continues to take [place] 60 units twice daily. Her A1c was 6.5 at last visit. She does have some numbers that are in the 200s. This is rare however. She admits that she does not have an appetite. She does follow with GI and has had a scope recently. She has not followed up with her cardiologist recently. Highly recommended to make this appointment. All questions were answered to patients satisfaction.     Past Medical History:   Diagnosis Date    Asthma     Atherosclerosis of native artery of left leg with rest pain ([address] 2018    C. difficile diarrhea     CAD (coronary artery disease)     Cellulitis and abscess of trunk 2015    Cerebellar infarct ([address] 4/3/2019    Remote, rt. cerebellum, head CT scan, 19    Chronic back pain     Chronic kidney disease     Chronic systolic congestive heart failure ([address] 10/4/2013    Chronic venous insufficiency 10/11/2017    COPD (chronic obstructive pulmonary disease) ([address]     Depression     Diabetes mellitus ([address]     Diabetic neuropathy ([address]     Diverticulosis     Fatty liver 1/8/2016    per US    Glaucoma, open angle 2/1/2016    Mild-OU    Hepatic encephalopathy ([address] 02/07/2016    resolved    Hiatal hernia     History of blood transfusion     Hyperlipidemia     Hyperplastic colon polyp     Hypertension     Incontinence     Liver mass     Morbid obesity ([address]     Movement disorder     Myocardial infarction ([address] 2011    Osteoarthritis, generalized     Pain in both lower legs 10/11/2017    Peripheral vascular disease ([address]     Pneumonia 1/26/2014    Pulmonary edema     resolved    PVD (peripheral vascular disease) with claudication ([address] 8/30/2017    Sleep apnea     uses BI PAP    Status post peripheral artery angioplasty 10/31/2019    Tobacco abuse     Tobacco abuse 10/11/2017    Tubular adenoma polyp of rectum     Urinary tract infection due to ESBL Klebsiella 03/08/2017    Ventral hernia        Past Surgical History:   Procedure Laterality Date    ANGIOPLASTY  11/13/2018    Dr. Shannon Bedoya & athrectomy L SFA & Popliteal    BREAST SURGERY  2000    bilateral reduction    BRONCHIAL BRUSH BIOPSY  1/25/2013    Dr Zeinab Cadena  04/20/2015    Dr. Katelyn Baum  12/2011     DR. Geovany Caro,  follows Dr North Patches  11/15/2013    Dr Jonn Amaro COLONOSCOPY  12/23/2016    Dr German Price adenoma & hyperplastic polyps, melanosis coli (repeat one year 12/2017)    CORONARY ARTERY BYPASS GRAFT      ECHO COMPLETE  10/9/2013         ENDOSCOPY, COLON, DIAGNOSTIC  04/18/2017    GALLBLADDER SURGERY      gallstones removed, [facility] 8/2017    HYSTERECTOMY      JOINT REPLACEMENT  2011    LEFT KNEE    KNEE SURGERY      left knee replacement    LAYER WOUND CLOSURE Left [id number]    LIVER BIOPSY  1/8/2016    [facility]    POLYSOMNOGRAPHY  1/2016 [facility]    UPPER GASTROINTESTINAL ENDOSCOPY  12/20/12    UPPER GASTROINTESTINAL ENDOSCOPY  12/23/2016    Dr Mable Jean Baptiste   [facility] ENDOSCOPY  02/08/2017       Social History     Socioeconomic History    Marital status:       Spouse name: None    Number of children: 2    Years of education: None    Highest education level: None   Occupational History    Occupation: disability   Social Needs    Financial resource strain: None    Food insecurity     Worry: None     Inability: None    Transportation needs     Medical: None     Non-medical: None   Tobacco Use    Smoking status: Current Every Day Smoker     Packs/day: 0.25     Years: 40.00     Pack years: 10.00     Types: Cigarettes     Start date: 8/10/1974    Smokeless tobacco: Never Used    Tobacco comment: 1 pack every 3 days (6-7 cig)   Substance and Sexual Activity    Alcohol use: No     Alcohol/week: 0.0 standard drinks    Drug use: Yes     Types: Marijuana     Comment: \"every 4th or 5th day out of the week\"    Sexual activity: Never   Lifestyle    Physical activity     Days per week: None     Minutes per session: None    Stress: None   Relationships    Social connections     Talks on phone: None     Gets together: None     Attends [employer] service: None     Active member of club or organization: None     Attends meetings of clubs or organizations: None     Relationship status: None    Intimate partner violence     Fear of current or ex partner: None     Emotionally abused: None     Physically abused: None     Forced sexual activity: None   Other Topics Concern    None   Social History Narrative    Pt lives with brother in her house-pt has never driven a car and depends on transportation       Family History   Problem Relation Age of Onset    Cancer Mother     Asthma Father           Current Outpatient Medications   Medication Sig Dispense Refill    amoxicillin (AMOXIL) 500 MG capsule Take 1 capsule by mouth 3 times daily for 7 days 21 capsule 0    benzonatate (TESSALON) 100 MG capsule Take 1 capsule by mouth 3 times daily as needed for Cough 30 capsule 0    Insulin Degludec (TRESIBA FLEXTOUCH) 200 UNIT/ML SOPN INJECT 60 UNITS INTO THE SKIN BID 10 pen 2    blood glucose test strips (ACCU-CHEK GUIDE) strip 1 each by In Vitro route 3 times daily 300 each 11    Handicap Placard MISC by Does not apply route Patient cannot walk 200 ft without stopping to rest.    Expiration 8/2022 1 each 0    SPIRIVA RESPIMAT 1.25 MCG/ACT AERS inhaler INHALE TWO (2) PUFFS BY MOUTH EVERY DAY      dicyclomine (BENTYL) 10 MG capsule TAKE 1 CAPSULE BY MOUTH TWICE A DAY      aspirin (ASPIRIN LOW DOSE) 81 MG chewable tablet TAKE 1 TABLET BY MOUTH EVERY DAY 90 tablet 1    clopidogrel (PLAVIX) 75 MG tablet TAKE 1 TABLET BY MOUTH EVERY DAY 90 tablet 1    vitamin D3 (CHOLECALCIFEROL) 25 MCG (1000 UT) TABS tablet Take 1 tablet by mouth daily 90 tablet 1    guaiFENesin (ROBITUSSIN) 100 MG/5ML liquid Take 20 mLs by mouth 3 times daily as needed for Cough 500 mL 2    fluticasone (FLONASE) 50 MCG/ACT nasal spray 1 spray by Nasal route 2 times daily 3 Bottle 1    lidocaine (XYLOCAINE) 5 % ointment APPLY TOPICALLY AS NEEDED FOR PAIN 35.44 g 11    meloxicam (MOBIC) 7.5 MG tablet TAKE 1 TABLET BY MOUTH EVERY DAY 90 tablet 1    nitroGLYCERIN (NITROSTAT) 0.4 MG SL tablet up to max of 3 total doses.  If no relief after 1 dose, call 911. 25 tablet 3    pantoprazole (PROTONIX) 40 MG tablet TAKE 1 TABLET BY MOUTH TWICE A DAY 30 tablet 2    MYRBETRIQ 50 MG TB24 TAKE 1 TABLET BY MOUTH EVERY DAY 90 tablet 1    cetirizine (ZYRTEC) 10 MG tablet TAKE 1 TABLET BY MOUTH EVERY DAY 90 tablet 1    baclofen (LIORESAL) 10 MG tablet TAKE 1 TABLET BY MOUTH TWICE A DAY AS NEEDED 180 tablet 1    Alcohol Swabs ([employer] ALCOHOL PREP SWABS) 70 % PADS Use daily 1 each 2    Blood Glucose Monitoring Suppl (ACCU-CHEK GUIDE ME) w/Device KIT Use as directed 1 kit 0    SOFT TOUCH LANCETS MISC Use 3 x daily 100 each 3    hydrALAZINE (APRESOLINE) 50 MG tablet Take 1 tablet by mouth 3 times daily 270 tablet 3    Accu-Chek FastClix Lancets MISC Use 4x daily 100 each 2    Insulin Syringe-Needle U-100 31G X 5/16\" 1 ML MISC 1 each by Does not apply route daily Use as directed 100 each 11    Insulin Pen Needle (B-D UF III MINI PEN NEEDLES) 31G X 5 MM MISC USE EVERY DAY AS DIRECTED 100 each 3    econazole nitrate 1 % cream Apply topically daily Apply topically daily.  ketorolac (ACULAR) 0.5 % ophthalmic solution 1 drop 4 times daily      linaCLOtide (LINZESS) 72 MCG CAPS capsule Take 72 mcg by mouth every morning (before breakfast)      loperamide (IMODIUM) 2 MG capsule Take 2 mg by mouth 4 times daily as needed for Diarrhea      HYDROcodone-acetaminophen (NORCO) 7.5-325 MG per tablet Take 1 tablet by mouth every 6 hours as needed for Pain.  metoprolol succinate (TOPROL XL) 25 MG extended release tablet Take 1 tablet by mouth 2 times daily 180 tablet 3    Misc.  Devices MISC Power wheelchair 1 Device 0    Lift Chair MISC by Does not apply route Use daily 1 each 0    bumetanide (BUMEX) 1 MG tablet TAKE 1 TABLET BY MOUTH DAILY 90 tablet 3    B-D UF III MINI PEN NEEDLES 31G X 5 MM MISC USE EVERY DAY AS DIRECTED 100 each 3    simvastatin (ZOCOR) 20 MG tablet TAKE (1) TABLET BY MOUTH NIGHTLY 90 tablet 10    ENTRESTO 24-26 MG per tablet TAKE 1 TABLET BY MOUTH TWICE DAILY 180 tablet 3    isosorbide mononitrate (IMDUR) 60 MG extended release tablet TAKE (1) TABLET BY MOUTH DAILY 90 tablet 3    montelukast (SINGULAIR) 10 MG tablet TAKE 1 TABLET BY MOUTH NIGHTLY 90 tablet 10    DULoxetine (CYMBALTA) 60 MG extended release capsule TAKE 1 CAPSULE BY MOUTH TWICE DAILY 180 capsule 10    Nutritional Supplements (GLUCOSE MANAGEMENT) TABS Use daily prn if low glucose <60 30 tablet 2    hydrocortisone 2.5 % cream APPLY TO AFFECTED AREA TWICE DAILY 60 g 2    gabapentin (NEURONTIN) 300 MG capsule Take 600 mg by mouth 3 times daily. Hallie Beards olopatadine (PATADAY) 0.2 % SOLN ophthalmic solution Place 1 drop into both eyes daily      Multiple Vitamins-Minerals (THERAPEUTIC MULTIVITAMIN-MINERALS) tablet Take 1 tablet by mouth daily      albuterol (PROVENTIL) (2.5 MG/3ML) 0.083% nebulizer solution Take 2.5 mg by nebulization every 6 hours as needed for Wheezing      BiPAP Machine MISC by Does not apply route nightly 27/23      traZODone (DESYREL) 100 MG tablet       oxybutynin (DITROPAN-XL) 10 MG extended release tablet       ondansetron (ZOFRAN) 4 MG tablet TAKE 1 TABLET BY MOUTH EVERY 6 HOURS AS NEEDED FOR NAUSEA       No current facility-administered medications for this visit.         Allergies   Allergen Reactions    Latex Hives    Bee Venom Anaphylaxis    Dilaudid [Hydromorphone Hcl] Itching    Dye [Iodides] Hives and Shortness Of Breath    Percocet [Oxycodone-Acetaminophen] Shortness Of Breath and Itching    Keflex [Cephalexin] Itching and Rash    Lasix [Furosemide] Other (See Comments)     Pt states she cramps up and gets headaches    Levaquin [Levofloxacin In D5w] Hives    Lipitor      MUSCLE SPASMS    Lyrica [Pregabalin]      Dream disturbances    Morphine Hives    Naproxen      Unsure of reaction;pt states able to take Aleve without difficulties    Nefazodone Other (See Comments)    Norvasc [Amlodipine] Swelling    Oxycodone-Acetaminophen Swelling    Shellfish-Derived Products     Trazodone And Nefazodone        Health Maintenance Due   Topic Date Due    Hepatitis A vaccine (1 of 2 - Risk 2-dose series) 12/29/1956    DTaP/Tdap/Td vaccine (1 - Tdap) 12/29/1973    Shingles Vaccine (1 of 2) 12/29/2004    Cervical cancer screen  09/16/2016    Breast cancer screen  11/10/2018    Diabetic microalbuminuria test  05/09/2019    Colon cancer screen colonoscopy  10/31/2019    Diabetic foot exam  11/08/2019    Annual Wellness Visit (AWV)  04/19/2020    Diabetic retinal exam 07/15/2020    Flu vaccine (1) 09/01/2020    A1C test (Diabetic or Prediabetic)  09/16/2020           REVIEW OF SYSTEMS  Review of Systems   Constitutional: Positive for fatigue. Negative for chills and fever. Uses walker   HENT: Positive for postnasal drip. Negative for congestion, ear pain, rhinorrhea, sinus pressure, sinus pain, sneezing, sore throat and trouble swallowing. Eyes: Negative for pain. Respiratory: Positive for cough and shortness of breath. Mucus production   Cardiovascular: Negative for chest pain and leg swelling. Gastrointestinal: Positive for abdominal pain. Negative for constipation, diarrhea, nausea, rectal pain and vomiting. Endocrine: Negative for cold intolerance and heat intolerance. Genitourinary: Negative for dysuria, frequency and urgency. Musculoskeletal: Positive for arthralgias, gait problem and neck pain. Negative for back pain and myalgias. Skin: Negative for rash. Allergic/Immunologic: Positive for environmental allergies. Negative for food allergies. Neurological: Negative for dizziness, tremors, syncope, light-headedness and headaches. Hematological: Negative for adenopathy. Does not bruise/bleed easily. Psychiatric/Behavioral: Positive for sleep disturbance. Negative for behavioral problems and dysphoric mood. The patient is not nervous/anxious. PHYSICAL EXAM  /72   Pulse 109   Temp 96.8 °F (36 °C)   Resp 24   Ht 5' 2\" (1.575 m)   Wt 228 lb (103.4 kg)   LMP 01/01/1990   SpO2 95%   BMI 41.70 kg/m²   Physical Exam  Vitals signs and nursing note reviewed. Constitutional:       Appearance: She is well-developed. She is obese. HENT:      Head: Normocephalic and atraumatic. Right Ear: External ear normal.      Left Ear: External ear normal.      Nose: Nose normal.   Eyes:      Conjunctiva/sclera: Conjunctivae normal.   Neck:      Musculoskeletal: Normal range of motion and neck supple. Thyroid: No thyromegaly. Cardiovascular:      Rate and Rhythm: Normal rate and regular rhythm. Pulmonary:      Effort: Pulmonary effort is normal.      Breath sounds: Wheezing (Slight wheezes noted) present. Abdominal:      Palpations: Abdomen is soft. There is no mass. Tenderness: There is abdominal tenderness (Generalized). There is no guarding or rebound. Musculoskeletal: Normal range of motion. General: No tenderness. Right lower leg: No edema. Left lower leg: No edema. Lymphadenopathy:      Cervical: No cervical adenopathy. Skin:     General: Skin is warm and dry. Findings: No rash. Neurological:      General: No focal deficit present. Mental Status: She is alert and oriented to person, place, and time. Mental status is at baseline. Deep Tendon Reflexes: Reflexes are normal and symmetric. Psychiatric:         Mood and Affect: Mood normal.         Behavior: Behavior normal.              Laboratory:   All laboratory and radiology results have been personally reviewed by myself    Lab Results   Component Value Date     06/16/2020    K 4.4 06/16/2020    K 5.4 09/20/2019    CL 99 06/16/2020    CO2 29 06/16/2020    BUN 25 06/16/2020    CREATININE 1.1 06/16/2020    PROT 7.8 06/16/2020    LABALBU 4.3 06/16/2020    LABALBU 5.2 12/01/2011    CALCIUM 9.8 06/16/2020    GFRAA >60 06/16/2020    LABGLOM >60 06/16/2020    GLUCOSE 190 06/16/2020    GLUCOSE 181 12/01/2011    AST 12 06/16/2020    ALT 22 06/16/2020    ALKPHOS 86 06/16/2020    BILITOT 0.4 06/16/2020    TSH 0.396 06/16/2020    VITD25 33 06/16/2020    CHOL 162 06/16/2020    TRIG 89 06/16/2020    HDL 46 06/16/2020    LDLCALC 98 06/16/2020    LABA1C 6.5 06/16/2020        Lab Results   Component Value Date    CHOL 162 06/16/2020    CHOL 222 (H) 06/05/2018    CHOL 159 12/01/2016     Lab Results   Component Value Date    TRIG 89 06/16/2020    TRIG 84 06/05/2018    TRIG 215 (H) 12/01/2016     Lab Results   Component Value Date    HDL 46 06/16/2020    HDL 38 06/05/2018    HDL 27 12/01/2016     Lab Results   Component Value Date    LDLCALC 98 06/16/2020    LDLCALC 167 (H) 06/05/2018    LDLCALC 89 12/01/2016       Lab Results   Component Value Date    LABA1C 6.5 (H) 06/16/2020    LABA1C 6.5 01/14/2020    LABA1C 5.8 06/26/2019     Lab Results   Component Value Date    LABMICR 82.0 (H) 12/01/2016    LDLCALC 98 06/16/2020    CREATININE 1.1 (H) 06/16/2020       ASSESSMENT/PLAN:     Diagnosis Orders   1. Chronic respiratory failure with hypoxia and hypercapnia (HCC)     2. DAYAN and COPD overlap syndrome ([facility]     3. Chronic combined systolic and diastolic heart failure ([facility]     4. Type 2 diabetes mellitus with diabetic peripheral angiopathy without gangrene, with long-term current use of insulin ([facility]     5. Coronary artery disease involving native coronary artery of native heart without angina pectoris     6. Essential hypertension     7. Tobacco use       Again we had a long discussion about smoking cessation today as this is likely contributing to most of her complaints. She does not wish to quit at this time. Highly recommended to follow-up with cardiology and pulmonology as directed. Tessalon Perles given. We will also treat with amoxicillin due to sinus pressure and pain. BP well controlled continue to monitor. Sugars under good control not due for A1c will check at next visit. Highly recommended to follow-up with GI for continued issues with appetite.   No other changes are made at this time    Problem list reviewed andsimplified/updated  HM reviewed today and counseled as appropriate    Call or go to ED immediately if symptoms worsen or persist.  Future Appointments   Date Time Provider Melanie Sellers   9/16/2020  2:[address], [address] [facility]   9/18/2020  1:30 PM [facility]   10/8/2020  2:00 PM [address], DO [facility]   11/5/2020 10:45 AM Kei Oliva MD VASC/MED [facility]     Or sooner if necessary. Educational materials and/or homeexercises printed for patient's review and were included in patient instructions on his/her After Visit Summary and given to patient at the end of visit.       Counseled regarding above diagnosis, including possible risks and complications,  especially if left uncontrolled.     Counseled regarding the possible side effects, risks, benefits and alternatives to treatment; patient and/or guardian verbalizes understanding, agrees,feels comfortable with and wishes to proceed with above treatment plan.     Advised patient to call Vergia Hendricks new medication issues, and read all Rx info from pharmacy to assure aware of all possible risks and side effects of medication before taking.     Reviewed age and gender appropriate health screening exams and vaccinations. Advised patient regarding importance of keeping up with recommended health maintenance and toschedule as soon as possible if overdue, as this is important in assessing for undiagnosed pathology, especially cancer, as well as protecting against potentially harmful/life threatening disease.          Patient and/or guardian verbalizes understanding and agrees with above counseling, assessment and plan.     All questions answered. Lai Smith DO  9/8/20    NOTE: This report was transcribed using voice recognition software.  Every effort was made to ensure accuracy; however, inadvertent computerized transcription errors may be present

## 2020-09-09 RX ORDER — SACUBITRIL AND VALSARTAN 24; 26 MG/1; MG/1
TABLET, FILM COATED ORAL
Qty: 180 TABLET | Refills: 3 | Status: ON HOLD
Start: 2020-09-09 | End: 2021-06-16 | Stop reason: HOSPADM

## 2020-09-09 ASSESSMENT — ENCOUNTER SYMPTOMS
RECTAL PAIN: 0
NAUSEA: 0
SINUS PRESSURE: 0
ABDOMINAL PAIN: 1
VOMITING: 0
TROUBLE SWALLOWING: 0
COUGH: 1
SORE THROAT: 0
SHORTNESS OF BREATH: 1
DIARRHEA: 0
SINUS PAIN: 0
BACK PAIN: 0
RHINORRHEA: 0
EYE PAIN: 0
CONSTIPATION: 0

## 2020-09-16 ENCOUNTER — OFFICE VISIT (OUTPATIENT)
Dept: ENT CLINIC | Age: 65
End: 2020-09-16
Payer: MEDICARE

## 2020-09-16 VITALS — TEMPERATURE: 97.5 F | HEIGHT: 62 IN | WEIGHT: 230 LBS | BODY MASS INDEX: 42.33 KG/M2

## 2020-09-16 PROCEDURE — 3017F COLORECTAL CA SCREEN DOC REV: CPT | Performed by: OTOLARYNGOLOGY

## 2020-09-16 PROCEDURE — 99204 OFFICE O/P NEW MOD 45 MIN: CPT | Performed by: OTOLARYNGOLOGY

## 2020-09-16 PROCEDURE — 4040F PNEUMOC VAC/ADMIN/RCVD: CPT | Performed by: OTOLARYNGOLOGY

## 2020-09-16 PROCEDURE — G8427 DOCREV CUR MEDS BY ELIG CLIN: HCPCS | Performed by: OTOLARYNGOLOGY

## 2020-09-16 PROCEDURE — 4004F PT TOBACCO SCREEN RCVD TLK: CPT | Performed by: OTOLARYNGOLOGY

## 2020-09-16 PROCEDURE — G8399 PT W/DXA RESULTS DOCUMENT: HCPCS | Performed by: OTOLARYNGOLOGY

## 2020-09-16 PROCEDURE — 1090F PRES/ABSN URINE INCON ASSESS: CPT | Performed by: OTOLARYNGOLOGY

## 2020-09-16 PROCEDURE — G8417 CALC BMI ABV UP PARAM F/U: HCPCS | Performed by: OTOLARYNGOLOGY

## 2020-09-16 PROCEDURE — 1123F ACP DISCUSS/DSCN MKR DOCD: CPT | Performed by: OTOLARYNGOLOGY

## 2020-09-16 RX ORDER — AZELASTINE 1 MG/ML
1 SPRAY, METERED NASAL 2 TIMES DAILY
Qty: 1 BOTTLE | Refills: 3 | Status: SHIPPED
Start: 2020-09-16 | End: 2022-04-11

## 2020-09-16 ASSESSMENT — ENCOUNTER SYMPTOMS
DIARRHEA: 0
EYES NEGATIVE: 1
ABDOMINAL PAIN: 0
RESPIRATORY NEGATIVE: 1
EYE PAIN: 0
SHORTNESS OF BREATH: 0
CHEST TIGHTNESS: 0
COLOR CHANGE: 0
RHINORRHEA: 1
APNEA: 0
GASTROINTESTINAL NEGATIVE: 1
VOMITING: 0
EYE DISCHARGE: 0
SINUS PRESSURE: 1

## 2020-09-16 NOTE — PROGRESS NOTES
Subjective:      Patient ID:  Marshell Kanner is a 71 y.o. female. HPI:    Sinusitis  Patientpresents with chronic sinusitis for 5 year. Her symptoms include nasal congestion, clear rhinorrhea, headaches, facial pain. Is the condition interfering with work? no   How: retired    The patient takes antibiotics for sinusitis 3 times per  year. The patient has not had sinus surgery in the past.     Prior antibiotic therapy has included Amoxicillin, no recent courses. For how lon month    Pt is also complaining of sore throat daily with constant coughing. Other medications have included Flonase    For how long: 3 year(s)    Relief: inadequate relief of symptoms. she has had allergy testing which was positive. Immunotherapy has been used with poor relief of symptoms. The patient has never had nasal polyps. The patient has a history of asthma. The patient has no history of eczema. Sleep study: yes  DAYAN: yes  CPAP:yes    Sinus lavage: yes   Relief: inadequate relief of symptoms.     Headaches/Facial Pain: yes   Where: bilateral-  frontal and maxillary    Perceived Nasal obstruction: yes   Side: right    The patients worst time of year is summer, fall, winter and spring    Past Medical History:   Diagnosis Date    Asthma     Atherosclerosis of native artery of left leg with rest pain ([address] 2018    C. difficile diarrhea     CAD (coronary artery disease)     Cellulitis and abscess of trunk 2015    Cerebellar infarct ([address] 4/3/2019    Remote, rt. cerebellum, head CT scan, 19    Chronic back pain     Chronic kidney disease     Chronic systolic congestive heart failure ([address] 10/4/2013    Chronic venous insufficiency 10/11/2017    COPD (chronic obstructive pulmonary disease) ([address]     Depression     Diabetes mellitus ([address]     Diabetic neuropathy ([address]     Diverticulosis     Fatty liver 2016    per [place]    Glaucoma, open angle 2016    Mild-OU    [facility]atic encephalopathy [facility] 02/07/2016    resolved    Hiatal hernia     History of blood transfusion     Hyperlipidemia     Hyperplastic colon polyp     Hypertension     Incontinence     Liver mass     Morbid obesity ([facility]     Movement disorder     Myocardial infarction [facility]) 2011    Osteoarthritis, generalized     Pain in both lower legs 10/11/2017    Peripheral vascular disease ([facility]     Pneumonia 1/26/2014    Pulmonary edema     resolved    PVD (peripheral vascular disease) with claudication ([facility] 8/30/2017    Sleep apnea     uses BI PAP    Status post peripheral artery angioplasty 10/31/2019    Tobacco abuse     Tobacco abuse 10/11/2017    Tubular adenoma polyp of rectum     Urinary tract infection due to ESBL Klebsiella 03/08/2017    Ventral hernia      Past Surgical History:   Procedure Laterality Date    ANGIOPLASTY  11/13/2018    Dr. Matilda Lou & athrectomy L SFA & Popliteal    BREAST SURGERY  2000    bilateral reduction    BRONCHIAL BRUSH BIOPSY  1/25/2013    Dr Gracie Augustin  04/20/2015    Dr. Mann Gerard  12/2011     DR. Bong Peterson,  follows Dr Tamia Norris  11/15/2013    Dr Jc Argueta COLONOSCOPY  12/23/2016    Dr Floyd Fontanez adenoma & hyperplastic polyps, melanosis coli (repeat one year 12/2017)    CORONARY ARTERY BYPASS GRAFT      ECHO COMPLETE  10/9/2013         ENDOSCOPY, COLON, DIAGNOSTIC  04/18/2017    GALLBLADDER SURGERY      gallstones removed, [facility] 8/2017    HYSTERECTOMY      JOINT REPLACEMENT  2011    LEFT KNEE    KNEE SURGERY      left knee replacement    LAYER WOUND CLOSURE Left [id number]    LIVER BIOPSY  1/8/2016    [facility]    POLYSOMNOGRAPHY  1/2016    [facility]    UPPER GASTROINTESTINAL ENDOSCOPY  12/20/12    UPPER GASTROINTESTINAL ENDOSCOPY  12/23/2016    Dr Jc Argueta UPPER GASTROINTESTINAL ENDOSCOPY  02/08/2017     Family History   Problem Relation Age of Onset  Cancer Mother     Asthma Father      Social History     Socioeconomic History    Marital status:      Spouse name: None    Number of children: 2    Years of education: None    Highest education level: None   Occupational History    Occupation: disability   Social Needs    Financial resource strain: None    Food insecurity     Worry: None     Inability: None    Transportation needs     Medical: None     Non-medical: None   Tobacco Use    Smoking status: Current Every Day Smoker     Packs/day: 0.25     Years: 40.00     Pack years: 10.00     Types: Cigarettes     Start date: 8/10/1974    Smokeless tobacco: Never Used    Tobacco comment: 1 pack every 3 days (6-7 cig)   Substance and Sexual Activity    Alcohol use:  Yes     Alcohol/week: 0.0 standard drinks     Comment: social    Drug use: Yes     Types: Marijuana     Comment: \"every 4th or 5th day out of the week\"    Sexual activity: Never   Lifestyle    Physical activity     Days per week: None     Minutes per session: None    Stress: None   Relationships    Social connections     Talks on phone: None     Gets together: None     Attends L[employer] service: None     Active member of club or organization: None     Attends meetings of clubs or organizations: None     Relationship status: None    Intimate partner violence     Fear of current or ex partner: None     Emotionally abused: None     Physically abused: None     Forced sexual activity: None   Other Topics Concern    None   Social History Narrative    Pt lives with brother in her house-pt has never driven a car and depends on transportation     Allergies   Allergen Reactions    Latex Hives    Bee Venom Anaphylaxis    Dilaudid [Hydromorphone Hcl] Itching    Dye [Iodides] Hives and Shortness Of Breath    Percocet [Oxycodone-Acetaminophen] Shortness Of Breath and Itching    Keflex [Cephalexin] Itching and Rash    Lasix [Furosemide] Other (See Comments)     Pt states she cramps up

## 2020-09-17 ENCOUNTER — OFFICE VISIT (OUTPATIENT)
Dept: CARDIOLOGY CLINIC | Age: 65
End: 2020-09-17
Payer: MEDICARE

## 2020-09-17 ENCOUNTER — TELEPHONE (OUTPATIENT)
Dept: ENT CLINIC | Age: 65
End: 2020-09-17

## 2020-09-17 VITALS
DIASTOLIC BLOOD PRESSURE: 60 MMHG | RESPIRATION RATE: 16 BRPM | HEIGHT: 62 IN | WEIGHT: 231.2 LBS | BODY MASS INDEX: 42.55 KG/M2 | HEART RATE: 75 BPM | SYSTOLIC BLOOD PRESSURE: 138 MMHG

## 2020-09-17 PROCEDURE — 1090F PRES/ABSN URINE INCON ASSESS: CPT | Performed by: INTERNAL MEDICINE

## 2020-09-17 PROCEDURE — 3017F COLORECTAL CA SCREEN DOC REV: CPT | Performed by: INTERNAL MEDICINE

## 2020-09-17 PROCEDURE — 1123F ACP DISCUSS/DSCN MKR DOCD: CPT | Performed by: INTERNAL MEDICINE

## 2020-09-17 PROCEDURE — 4040F PNEUMOC VAC/ADMIN/RCVD: CPT | Performed by: INTERNAL MEDICINE

## 2020-09-17 PROCEDURE — G8427 DOCREV CUR MEDS BY ELIG CLIN: HCPCS | Performed by: INTERNAL MEDICINE

## 2020-09-17 PROCEDURE — 4004F PT TOBACCO SCREEN RCVD TLK: CPT | Performed by: INTERNAL MEDICINE

## 2020-09-17 PROCEDURE — 99214 OFFICE O/P EST MOD 30 MIN: CPT | Performed by: INTERNAL MEDICINE

## 2020-09-17 PROCEDURE — G8417 CALC BMI ABV UP PARAM F/U: HCPCS | Performed by: INTERNAL MEDICINE

## 2020-09-17 PROCEDURE — 93000 ELECTROCARDIOGRAM COMPLETE: CPT | Performed by: INTERNAL MEDICINE

## 2020-09-17 PROCEDURE — G8399 PT W/DXA RESULTS DOCUMENT: HCPCS | Performed by: INTERNAL MEDICINE

## 2020-09-17 NOTE — TELEPHONE ENCOUNTER
Called radiology spoke with Chio See patient is scheduled at [facility] on 9/24/2020 at 4:30pm for Sinus CT w/o Contrast. Patient is to arrive at 4:00pm and bring insurance card, drivers license and wear a mask. Called patient to confirm appointment 9/24/2020 and follow up in office 10/1/2020 patient confirmed.

## 2020-09-17 NOTE — PROGRESS NOTES
OUTPATIENT CARDIOLOGY FOLLOW-UP    Name: Melody Staff    Age: 71 y.o. Primary Care Physician: Cherie Diaz DO    Date of Service: 9/17/2020    Chief Complaint:   Chief Complaint   Patient presents with    Coronary Artery Disease    Follow-up       Interim History:  Mrs. Mónica Ye is a 79-year-old  female with history of for coronary artery disease underwent CABG in 2011 and history of severe LV dysfunction with an EF of 25% based on echocardiogram done in 2013, cardiac cath in 2013 showed no progression of disease, chronic right bundle-branch block, type II diabetes, hypertension, hyperlipidemia, morbid obesity, active tobacco use still smoking about 10 cigarettes a day, marijuana use, obstructive sleep apnea on CPAP and history of severe peripheral vascular disease came for follow-up visit. She was last seen in the office was on 11/19/2019, Since her last evaluation, she has not had any further hospitalizations or ER visits. She underwent left popliteal and tibial trunk atherectomy on 11/13/2018 and was initiated on plavix and aspirin. She was discharged home on oxygen and she has been using mainly at nighttime. She denies any chest pain, increased dyspnea or leg edema. She is still smoking about 5 cigarettes a day and does not want to quit. She denies any dizziness, orthopnea, paroxysmal nocturnal dyspnea or leg edema. She gets cough and congestion whenever she goes out. She has multiple allergies to grass and weeds. She is following with ear nose throat doctor and scheduled to undergo CT chest.  She is also complaining of right-sided chest pain on coughing. She denies any fever or chills. No palpitations, chest pain, syncope or presyncope. She is complaint with her medications and denies taking any over-the-counter medications. She denies using any other illicit drugs other than marijuana. She told me that she is complaint with her salt and fluid intake.     SR on EKG.    Review of Systems:   Cardiac: As per HPI  General: No fever, chills  Pulmonary: As per HPI  HEENT: No visual disturbances, difficult swallowing  GI: No nausea, vomiting  Endocrine: No thyroid disease or DM  Musculoskeletal: PEDROZA x 4, no focal motor deficits  Skin: Intact, no rashes  Neuro/Psych: No headache or seizures    Past Medical History:  Past Medical History:   Diagnosis Date    Asthma     Atherosclerosis of native artery of left leg with rest pain ([address] 11/9/2018    C. difficile diarrhea 2015    CAD (coronary artery disease) 2011    Cellulitis and abscess of trunk 4/14/2015    Cerebellar infarct ([address] 4/3/2019    Remote, rt. cerebellum, head CT scan, 1/9/19    Chronic back pain     Chronic kidney disease     Chronic systolic congestive heart failure ([address] 10/4/2013    Chronic venous insufficiency 10/11/2017    COPD (chronic obstructive pulmonary disease) ([address]     Depression     Diabetes mellitus ([address]     Diabetic neuropathy ([address]     Diverticulosis     Fatty liver 1/8/2016    per [place]    Glaucoma, open angle 2/1/2016    Mild-OU    Hepatic encephalopathy ([address] 02/07/2016    resolved    Hiatal hernia     History of blood transfusion     Hyperlipidemia     Hyperplastic colon polyp     Hypertension     Incontinence     Liver mass     Morbid obesity ([address]     Movement disorder     Myocardial infarction ([address] 2011    Osteoarthritis, generalized     Pain in both lower legs 10/11/2017    Peripheral vascular disease ([address]     Pneumonia 1/26/2014    Pulmonary edema     resolved    PVD (peripheral vascular disease) with claudication ([address] 8/30/2017    Sleep apnea     uses BI PAP    Status post peripheral artery angioplasty 10/31/2019    Tobacco abuse     Tobacco abuse 10/11/2017    Tubular adenoma polyp of rectum     Urinary tract infection due to ESBL Klebsiella 03/08/2017    Ventral hernia        Past Surgical History:  Past Surgical History:   Procedure Laterality Date Alcohol/week: 0.0 standard drinks     Comment: social    Drug use: Yes     Types: Marijuana     Comment: \"every 4th or 5th day out of the week\"    Sexual activity: Never   Lifestyle    Physical activity     Days per week: Not on file     Minutes per session: Not on file    Stress: Not on file   Relationships    Social connections     Talks on phone: Not on file     Gets together: Not on file     Attends Church service: Not on file     Active member of club or organization: Not on file     Attends meetings of clubs or organizations: Not on file     Relationship status: Not on file    Intimate partner violence     Fear of current or ex partner: Not on file     Emotionally abused: Not on file     Physically abused: Not on file     Forced sexual activity: Not on file   Other Topics Concern    Not on file   Social History Narrative    Pt lives with brother in her house-pt has never driven a car and depends on transportation       Allergies:   Allergies   Allergen Reactions    Latex Hives    Bee Venom Anaphylaxis    Dilaudid [Hydromorphone Hcl] Itching    Dye [Iodides] Hives and Shortness Of Breath    Percocet [Oxycodone-Acetaminophen] Shortness Of Breath and Itching    Keflex [Cephalexin] Itching and Rash    Lasix [Furosemide] Other (See Comments)     Pt states she cramps up and gets headaches    Levaquin [Levofloxacin In D5w] Hives    Lipitor      MUSCLE SPASMS    Lyrica [Pregabalin]      Dream disturbances    Morphine Hives    Naproxen      Unsure of reaction;pt states able to take Aleve without difficulties    Nefazodone Other (See Comments)    Norvasc [Amlodipine] Swelling    Oxycodone-Acetaminophen Swelling    Shellfish-Derived Products     Trazodone And Nefazodone        Current Medications:  Current Outpatient Medications   Medication Sig Dispense Refill    azelastine (ASTELIN) 0.1 % nasal spray 1 spray by Nasal route 2 times daily Use in each nostril as directed 1 Bottle 3    ENTRESTO 50 MG tablet Take 1 tablet by mouth 3 times daily 270 tablet 3    Accu-Chek FastClix Lancets MISC Use 4x daily 100 each 2    Insulin Syringe-Needle U-100 31G X 5/16\" 1 ML MISC 1 each by Does not apply route daily Use as directed 100 each 11    Insulin Pen Needle (B-D UF III MINI PEN NEEDLES) 31G X 5 MM MISC USE EVERY DAY AS DIRECTED 100 each 3    econazole nitrate 1 % cream Apply topically daily Apply topically daily.  ketorolac (ACULAR) 0.5 % ophthalmic solution 1 drop 4 times daily      linaCLOtide (LINZESS) 72 MCG CAPS capsule Take 72 mcg by mouth every morning (before breakfast)      loperamide (IMODIUM) 2 MG capsule Take 2 mg by mouth 4 times daily as needed for Diarrhea      metoprolol succinate (TOPROL XL) 25 MG extended release tablet Take 1 tablet by mouth 2 times daily 180 tablet 3    Misc.  Devices MISC Power wheelchair 1 Device 0    Lift Chair MISC by Does not apply route Use daily 1 each 0    bumetanide (BUMEX) 1 MG tablet TAKE 1 TABLET BY MOUTH DAILY 90 tablet 3    B-D UF III MINI PEN NEEDLES 31G X 5 MM MISC USE EVERY DAY AS DIRECTED 100 each 3    simvastatin (ZOCOR) 20 MG tablet TAKE (1) TABLET BY MOUTH NIGHTLY 90 tablet 10    isosorbide mononitrate (IMDUR) 60 MG extended release tablet TAKE (1) TABLET BY MOUTH DAILY 90 tablet 3    montelukast (SINGULAIR) 10 MG tablet TAKE 1 TABLET BY MOUTH NIGHTLY 90 tablet 10    DULoxetine (CYMBALTA) 60 MG extended release capsule TAKE 1 CAPSULE BY MOUTH TWICE DAILY 180 capsule 10    Nutritional Supplements (GLUCOSE MANAGEMENT) TABS Use daily prn if low glucose <60 30 tablet 2    hydrocortisone 2.5 % cream APPLY TO AFFECTED AREA TWICE DAILY 60 g 2    Multiple Vitamins-Minerals (THERAPEUTIC MULTIVITAMIN-MINERALS) tablet Take 1 tablet by mouth daily      albuterol (PROVENTIL) (2.5 MG/3ML) 0.083% nebulizer solution Take 2.5 mg by nebulization every 6 hours as needed for Wheezing      BiPAP Machine MISC by Does not apply route nightly 27/23      clopidogrel (PLAVIX) 75 MG tablet TAKE 1 TABLET BY MOUTH EVERY DAY (Patient not taking: Reported on 9/16/2020) 90 tablet 1    guaiFENesin (ROBITUSSIN) 100 MG/5ML liquid Take 20 mLs by mouth 3 times daily as needed for Cough (Patient not taking: Reported on 9/17/2020) 500 mL 2    HYDROcodone-acetaminophen (NORCO) 7.5-325 MG per tablet Take 1 tablet by mouth every 6 hours as needed for Pain.  gabapentin (NEURONTIN) 300 MG capsule Take 600 mg by mouth 3 times daily. Sukhi Smallwood olopatadine (PATADAY) 0.2 % SOLN ophthalmic solution Place 1 drop into both eyes daily       No current facility-administered medications for this visit. Physical Exam:  /60   Pulse 75   Resp 16   Ht 5' 2\" (1.575 m)   Wt 231 lb 3.2 oz (104.9 kg)   LMP 01/01/1990   BMI 42.29 kg/m²   Wt Readings from Last 3 Encounters:   09/17/20 231 lb 3.2 oz (104.9 kg)   09/16/20 230 lb (104.3 kg)   09/08/20 228 lb (103.4 kg)     Appearance: Awake, alert and oriented x 3, no acute respiratory distress  Skin: Intact, no rash  Head: Normocephalic, atraumatic  Eyes: EOMI, no conjunctival erythema  ENMT: No pharyngeal erythema, MMM, no rhinorrhea  Neck: Supple, no elevated JVP, no carotid bruits  Lungs: Clear to auscultation bilaterally. No wheezes, rales, or rhonchi.   Cardiac: Regular rate and rhythm, +S1S2, no murmurs apparent  Abdomen: Soft, nontender, +bowel sounds  Extremities: Moves all extremities x 4, no lower extremity edema  Neurologic: No focal motor deficits apparent, normal mood and affect, alert and oriented x 3  Peripheral Pulses: Intact posterior tibial pulses bilaterally    Laboratory Tests:  Lab Results   Component Value Date    CREATININE 1.1 (H) 06/16/2020    BUN 25 (H) 06/16/2020     06/16/2020    K 4.4 06/16/2020    CL 99 06/16/2020    CO2 29 06/16/2020     Lab Results   Component Value Date    MG 2.0 04/13/2019     Lab Results   Component Value Date    WBC 9.5 06/16/2020    HGB 14.9 06/16/2020    HCT 46.3 06/16/2020 MCV 99.6 06/16/2020     06/16/2020     Lab Results   Component Value Date    ALT 22 06/16/2020    AST 12 06/16/2020    ALKPHOS 86 06/16/2020    BILITOT 0.4 06/16/2020     Lab Results   Component Value Date    CKTOTAL 88 01/05/2016    CKMB 1.2 01/05/2016    TROPONINI <0.01 09/19/2019    TROPONINI <0.01 04/18/2019    TROPONINI <0.01 04/17/2019     Lab Results   Component Value Date    INR 1.0 01/04/2019    INR 1.1 11/13/2018    INR 1.1 07/26/2018    PROTIME 11.7 01/04/2019    PROTIME 12.3 11/13/2018    PROTIME 12.2 07/26/2018     Lab Results   Component Value Date    TSH 0.396 06/16/2020     Lab Results   Component Value Date    LABA1C 6.5 (H) 06/16/2020     No results found for: EAG  Lab Results   Component Value Date    CHOL 162 06/16/2020    CHOL 222 (H) 06/05/2018    CHOL 159 12/01/2016     Lab Results   Component Value Date    TRIG 89 06/16/2020    TRIG 84 06/05/2018    TRIG 215 (H) 12/01/2016     Lab Results   Component Value Date    HDL 46 06/16/2020    HDL 38 06/05/2018    HDL 27 12/01/2016     Lab Results   Component Value Date    LDLCALC 98 06/16/2020    LDLCALC 167 (H) 06/05/2018    LDLCALC 89 12/01/2016     Lab Results   Component Value Date    LABVLDL 18 06/16/2020    LABVLDL 17 06/05/2018    LABVLDL 43 12/01/2016     No results found for: CHOLHDLRATIO    Cardiac Tests:  ECG: Normal sinus rhythm, RBBB abnormal ECG        Echocardiogram: 1/4/2017  LVEF 35-45%. LV diffuse hypokinesis,mild MR  TTE- 6/5/2018:LVEF 65%, moderate LVH, stage II diastolic dysfunction,    [facility]-9/7/9707-PSIL 65%, moderate LVH, stage II diastolic dysfunction,    Stress test: 6/05/2018:  1. Pharmacologically-induced perfusion defect involving the lateral   wall, without evidence of reversibility. 2. Diffusely hypokinetic left ventricular wall motion with a focal   area of akinesia involving the mid lateral wall.    3. Left ventricular ejection fraction of 42 %       The 10-year ASCVD risk score (Linda Copeland., et al., 2013) is: 33.3%    Values used to calculate the score:      Age: 71 years      Sex: Female      Is Non- : Yes      Diabetic: Yes      Tobacco smoker: Yes      Systolic Blood Pressure: 966 mmHg      Is BP treated: No      HDL Cholesterol: 46 mg/dL      Total Cholesterol: 162 mg/dL  [facility]-4/20/2015:  IMPRESSION:   1. Atherosclerotic coronary disease. a. Status post remote bypass grafting. Patent SVG to OM circumflex. b.    Sadie Foster to selectively cannulate left internal mammary artery      graft, but there is no  significant disease in the LAD itself. 2.    Moderate global left ventricular systolic dysfunction. ASSESSMENT:  1. Heart failure with an improved ejection fraction with an EF of 40-45%-->65%  2. ACC/AHA, stage C., NYHA functional class II, now euvolemic. Diastolic dysfunction   3. CAD, status post CABG in 2011 LIMA to LAD, SVG to OM 1  4. Morbid obesity with obstructive sleep apnea  5. Hypertension-stable  6. Hyperlipidemia on statin  7. Type II diabetes  8. Tobacco and marijuana use, still smoking about 5 to 6 cigarettes a day. 9. PAD, S/p left popliteal and tibial atherectomy/angioplasty -stable  10. Chronic RBBB. Plan:   1. She is on guideline directed medical therapy for heart failure with reduced ejection fraction, continue Entresto 24/26 twice a day and metoprolol XL 25 mg p.o. twice daily  2. Continue Bumex to 1 mg po daily  3. Continue rest of the medications including hydralazine and isosorbide\"  4. She was advised again to stop smoking and told me that she does not want to quit. 5. Follow up 6 months. The patient's current medication list, allergies, problem list and results of all previously ordered testing were reviewed at today's visit.   Fiona Hernandez MD  [facility]) Cardiology

## 2020-09-17 NOTE — PATIENT INSTRUCTIONS
1. Continue Entresto 24/26 twice a day  2. Continue Bumex to 1 mg po daily  3. Continue metoprolol xl to 25 mg po twice a day  4. Continue rest of the medications including hydralazine and isosorbide\"  5. She was advised again to stop smoking. 6. Follow up 6 months.

## 2020-09-18 ENCOUNTER — TELEPHONE (OUTPATIENT)
Dept: FAMILY MEDICINE CLINIC | Age: 65
End: 2020-09-18

## 2020-09-18 ENCOUNTER — TELEPHONE (OUTPATIENT)
Dept: CARDIOLOGY | Age: 65
End: 2020-09-18

## 2020-09-18 NOTE — TELEPHONE ENCOUNTER
CALLED PATIENT TO RESCHEDULE ECHO THAT WAS SCHEDULED FOR THIS AFTERNOON. PATIENT STATES THAT SHE HAD SEEN DR. Maria Luisa Haynes AND SAID NO NEED TO HAVE ECHO DONE. PATIENT SPOKE WITH MIGUEL ANGEL UPSTAIRS FROM [facility] AND MIGUEL ANGEL SAID SHE WOULD CANCEL TEST. TEST WAS NEVER  CANCELLED.      Electronically signed by Igor Lowry on 9/18/2020 at 2:17 PM

## 2020-09-18 NOTE — TELEPHONE ENCOUNTER
Pt called today states Dr Danny Prakash ordered a ECHO for pt, but pt states her Cardiologist states not needed pts heart has actually improved, any questions call.  FYI

## 2020-10-01 ENCOUNTER — OFFICE VISIT (OUTPATIENT)
Dept: ENT CLINIC | Age: 65
End: 2020-10-01
Payer: MEDICARE

## 2020-10-01 VITALS — TEMPERATURE: 97 F | HEIGHT: 62 IN | BODY MASS INDEX: 42.14 KG/M2 | WEIGHT: 229 LBS

## 2020-10-01 PROCEDURE — 4004F PT TOBACCO SCREEN RCVD TLK: CPT | Performed by: OTOLARYNGOLOGY

## 2020-10-01 PROCEDURE — G8399 PT W/DXA RESULTS DOCUMENT: HCPCS | Performed by: OTOLARYNGOLOGY

## 2020-10-01 PROCEDURE — G8417 CALC BMI ABV UP PARAM F/U: HCPCS | Performed by: OTOLARYNGOLOGY

## 2020-10-01 PROCEDURE — 1090F PRES/ABSN URINE INCON ASSESS: CPT | Performed by: OTOLARYNGOLOGY

## 2020-10-01 PROCEDURE — 99213 OFFICE O/P EST LOW 20 MIN: CPT | Performed by: OTOLARYNGOLOGY

## 2020-10-01 PROCEDURE — 1123F ACP DISCUSS/DSCN MKR DOCD: CPT | Performed by: OTOLARYNGOLOGY

## 2020-10-01 PROCEDURE — 3017F COLORECTAL CA SCREEN DOC REV: CPT | Performed by: OTOLARYNGOLOGY

## 2020-10-01 PROCEDURE — G8484 FLU IMMUNIZE NO ADMIN: HCPCS | Performed by: OTOLARYNGOLOGY

## 2020-10-01 PROCEDURE — 4040F PNEUMOC VAC/ADMIN/RCVD: CPT | Performed by: OTOLARYNGOLOGY

## 2020-10-01 PROCEDURE — G8427 DOCREV CUR MEDS BY ELIG CLIN: HCPCS | Performed by: OTOLARYNGOLOGY

## 2020-10-01 ASSESSMENT — ENCOUNTER SYMPTOMS
SHORTNESS OF BREATH: 1
RHINORRHEA: 1
COUGH: 1
EYES NEGATIVE: 1

## 2020-10-01 NOTE — PROGRESS NOTES
Subjective:      Patient ID:  Megan Pride is a 71 y.o. female. HPI:    Pt returns for review of CT Sinus. There are not changes since last visit. Still complaining of bilateral frontal pain. Has not had infection since last visit. Pt has been on fluticasone (Flonase) for man yeas and is doing marginally        Patient's medications, allergies, past medical, surgical, social and family histories were reviewed and updated as appropriate. Review of Systems   Constitutional: Negative. HENT: Positive for congestion, postnasal drip and rhinorrhea. Eyes: Negative. Respiratory: Positive for cough and shortness of breath. Skin: Negative. Allergic/Immunologic: Positive for environmental allergies. Neurological: Negative. Hematological: Negative. Psychiatric/Behavioral: Negative. All other systems reviewed and are negative. Objective:   Physical Exam  Vitals signs reviewed. Constitutional:       Appearance: Normal appearance. HENT:      Head: Normocephalic. Right Ear: Tympanic membrane, ear canal and external ear normal.      Left Ear: Tympanic membrane, ear canal and external ear normal.      Nose: Nose normal.      Right Turbinates: Swollen and pale. Left Turbinates: Swollen and pale. Mouth/Throat:      Pharynx: Oropharynx is clear. Uvula midline. Eyes:      Conjunctiva/sclera: Conjunctivae normal.   Neck:      Musculoskeletal: Normal range of motion and neck supple. Cardiovascular:      Rate and Rhythm: Normal rate. Pulses: Normal pulses. Pulmonary:      Effort: Pulmonary effort is normal.   Lymphadenopathy:      Cervical: No cervical adenopathy. Skin:     Capillary Refill: Capillary refill takes less than 2 seconds. Neurological:      Mental Status: She is alert and oriented to person, place, and time.              CT sinuses (my review)    yes - maxine cells - left  no - tala bullosa -   yes - Enlarged inferior turbinates - bilateral . Left inferior turbinate with accessory air cell  no - Obstructed OMC -   no - maxillary sinus disease -  no - frontal sinus disease-   Left frontal sinus   Absent -  no   Size - Moderate  Right frontal sinus   Absent -  no   Size - Moderate  no - sphenoid sinus disease - ,  no - Deviated nasal septum - ,   no - Septal Spur -   no - posterior accessory os ,                                          Assessment:       Diagnosis Orders   1. Seasonal allergic rhinitis due to pollen  [facility] All[facility]   2. Facial pain     3. DAYAN (obstructive sleep apnea)                Plan:      Allergic rhinitis  I would like the patient to continue fluticasone (Flonase) and have instructed them to use it daily at bedtime. Call or return to clinic prn if these symptoms worsen or fail to improve as anticipated. Insurance does not cover astelin    I will refer patient to allergist for futher workup    Discussed in depth FESS and SMIRTs with patient to help fix some of her usual anatomy especially the left inferior turbinate and large uncinate. Patient would not like surgery at this time. Follow up in 6 month(s)                       Susi Price  1955    I have discussed the case, including pertinent history and exam findings with the resident. I have seen and examined the patient and the key elements of the encounter have been performed by me. I agree with the assessment, plan and orders as documented by the  resident              Remainder of medical problems as per  resident note. Patient seen and examined. Agree with above exam, assessment and plan.       Electronically signed by uJan Choudhury DO on 10/1/20 at 4:51 PM EDT

## 2020-10-06 RX ORDER — SIMVASTATIN 20 MG
TABLET ORAL
Qty: 90 TABLET | Refills: 3 | Status: SHIPPED
Start: 2020-10-06 | End: 2021-09-15 | Stop reason: SDUPTHER

## 2020-10-12 NOTE — TELEPHONE ENCOUNTER
Last Appointment:  9/8/2020  Future Appointments   Date Time Provider Melanie Verito   11/5/2020 10:45 AM MD MAR Das/MED [facility]   4/1/2021  2:30 PM DO JUSTA Herrera [facility] - BEHAVIORAL HEALTH SERVICES ENT [facility]

## 2020-10-13 RX ORDER — BACLOFEN 10 MG/1
TABLET ORAL
Qty: 180 TABLET | Refills: 1 | Status: SHIPPED
Start: 2020-10-13 | End: 2021-04-06

## 2020-10-13 RX ORDER — ISOSORBIDE MONONITRATE 60 MG/1
TABLET, EXTENDED RELEASE ORAL
Qty: 90 TABLET | Refills: 3 | Status: SHIPPED
Start: 2020-10-13 | End: 2020-11-18 | Stop reason: SDUPTHER

## 2020-10-13 RX ORDER — PEN NEEDLE, DIABETIC 31 GX5/16"
NEEDLE, DISPOSABLE MISCELLANEOUS
Qty: 100 EACH | Refills: 11 | Status: SHIPPED
Start: 2020-10-13 | End: 2021-05-05 | Stop reason: ALTCHOICE

## 2020-10-13 RX ORDER — BUMETANIDE 1 MG/1
TABLET ORAL
Qty: 30 TABLET | Refills: 10 | Status: ON HOLD
Start: 2020-10-13 | End: 2021-07-21 | Stop reason: HOSPADM

## 2020-10-13 RX ORDER — ISOSORBIDE MONONITRATE 60 MG/1
TABLET, EXTENDED RELEASE ORAL
Qty: 30 TABLET | Refills: 10 | OUTPATIENT
Start: 2020-10-13

## 2020-10-13 RX ORDER — TRAZODONE HYDROCHLORIDE 100 MG/1
TABLET ORAL
Qty: 30 TABLET | Refills: 10 | Status: SHIPPED
Start: 2020-10-13 | End: 2022-04-11

## 2020-10-20 ENCOUNTER — TELEPHONE (OUTPATIENT)
Dept: FAMILY MEDICINE CLINIC | Age: 65
End: 2020-10-20

## 2020-10-23 RX ORDER — CETIRIZINE HYDROCHLORIDE 10 MG/1
TABLET ORAL
Qty: 90 TABLET | Refills: 1 | Status: SHIPPED
Start: 2020-10-23 | End: 2021-04-13

## 2020-11-04 ENCOUNTER — TELEPHONE (OUTPATIENT)
Dept: VASCULAR SURGERY | Age: 65
End: 2020-11-04

## 2020-11-04 NOTE — TELEPHONE ENCOUNTER
Called to confirm appointment for 11/5/20 at [phone], left message for patient with appointment date, time and phone number.

## 2020-11-17 RX ORDER — MIRABEGRON 50 MG/1
TABLET, FILM COATED, EXTENDED RELEASE ORAL
Qty: 90 TABLET | Refills: 1 | Status: SHIPPED
Start: 2020-11-17 | End: 2021-05-10

## 2020-11-18 ENCOUNTER — TELEPHONE (OUTPATIENT)
Dept: VASCULAR SURGERY | Age: 65
End: 2020-11-18

## 2020-11-18 RX ORDER — ISOSORBIDE MONONITRATE 60 MG/1
TABLET, EXTENDED RELEASE ORAL
Qty: 90 TABLET | Refills: 3 | Status: SHIPPED
Start: 2020-11-18 | End: 2021-09-15 | Stop reason: SDUPTHER

## 2020-11-24 RX ORDER — METOPROLOL SUCCINATE 25 MG/1
25 TABLET, EXTENDED RELEASE ORAL 2 TIMES DAILY
Qty: 180 TABLET | Refills: 3 | Status: ON HOLD
Start: 2020-11-24 | End: 2021-03-13 | Stop reason: HOSPADM

## 2020-12-01 ENCOUNTER — VIRTUAL VISIT (OUTPATIENT)
Dept: FAMILY MEDICINE CLINIC | Age: 65
End: 2020-12-01
Payer: MEDICARE

## 2020-12-01 PROCEDURE — 3017F COLORECTAL CA SCREEN DOC REV: CPT | Performed by: FAMILY MEDICINE

## 2020-12-01 PROCEDURE — 4040F PNEUMOC VAC/ADMIN/RCVD: CPT | Performed by: FAMILY MEDICINE

## 2020-12-01 PROCEDURE — G8427 DOCREV CUR MEDS BY ELIG CLIN: HCPCS | Performed by: FAMILY MEDICINE

## 2020-12-01 PROCEDURE — 99214 OFFICE O/P EST MOD 30 MIN: CPT | Performed by: FAMILY MEDICINE

## 2020-12-01 PROCEDURE — 3044F HG A1C LEVEL LT 7.0%: CPT | Performed by: FAMILY MEDICINE

## 2020-12-01 PROCEDURE — 2022F DILAT RTA XM EVC RTNOPTHY: CPT | Performed by: FAMILY MEDICINE

## 2020-12-01 PROCEDURE — G8399 PT W/DXA RESULTS DOCUMENT: HCPCS | Performed by: FAMILY MEDICINE

## 2020-12-01 PROCEDURE — 1090F PRES/ABSN URINE INCON ASSESS: CPT | Performed by: FAMILY MEDICINE

## 2020-12-01 PROCEDURE — 1123F ACP DISCUSS/DSCN MKR DOCD: CPT | Performed by: FAMILY MEDICINE

## 2020-12-01 RX ORDER — CALCIUM CARBONATE 500(1250)
500 TABLET ORAL DAILY
COMMUNITY
End: 2021-05-05 | Stop reason: ALTCHOICE

## 2020-12-01 RX ORDER — ZINC GLUCONATE 50 MG
50 TABLET ORAL DAILY
COMMUNITY
End: 2021-04-26 | Stop reason: SDUPTHER

## 2020-12-01 RX ORDER — MAGNESIUM OXIDE 400 MG/1
400 TABLET ORAL DAILY
COMMUNITY
End: 2021-04-26 | Stop reason: SDUPTHER

## 2020-12-01 RX ORDER — SIMETHICONE 180 MG
CAPSULE ORAL
COMMUNITY
Start: 2020-10-08 | End: 2021-05-05 | Stop reason: ALTCHOICE

## 2020-12-01 RX ORDER — GUAIFENESIN 100 MG/5ML
SYRUP ORAL
COMMUNITY
Start: 2020-10-16 | End: 2021-04-19

## 2020-12-01 RX ORDER — METOCLOPRAMIDE 5 MG/1
TABLET ORAL
COMMUNITY
Start: 2020-10-12 | End: 2021-04-15 | Stop reason: ALTCHOICE

## 2020-12-01 NOTE — PROGRESS NOTES
This visit was performed as a virtual video visit using a synchronous, two-way, audio-video telehealth technology platform. Patient identification was verified at the start of the visit, including the patient's telephone number and physical location. I discussed with the patient the nature of our telehealth visits, that:     1. Due to the nature of an audio- video modality, the only components of a physical exam that could be done are the elements supported by direct observation. 2. I would evaluate the patient and recommend diagnostics and treatments based on my assessment. 3. If it was felt that the patient should be evaluated in clinic or an emergency room setting, then they would be directed there. 4. Our sessions are not being recorded and that personal health information is protected. 5. Our team would provide follow up care in person if/when the patient needs it. Patient does agree to proceed with telemedicine consultation. Patient's location: home address in [facility]    Physician  location home address in [facility]   other people involved in call: none        Time spent: Greater than Not billed by time    This visit was completed virtually using Doxy. me        [facility]  : 1955    Chief Complaint:     Chief Complaint   Patient presents with   Harland Clarita     loss of appetite;        [facility] 71 y.o. presents for   Chief Complaint   Patient presents with    Bloated     loss of appetite; Patient continues to complain of bloating and issues with her bowels. She does follow with gastroenterology which they have increased her Linzess. They believe she is slightly constipated. She states that she has no appetite at times. She denies any diarrhea currently. She also states her sugars have been on the lower end of normal.  She is currently taking 60 units of basal insulin twice daily. Her breathing has been under good control.   She continues to smoke tobacco as well as marijuana. She understands the importance of quitting smoking for better health. She is due for blood work today    All questions were answered to patients satisfaction.     Past Medical History:   Diagnosis Date    Asthma     Atherosclerosis of native artery of left leg with rest pain ([address] 11/9/2018    C. difficile diarrhea 2015    CAD (coronary artery disease) 2011    Cellulitis and abscess of trunk 4/14/2015    Cerebellar infarct ([address] 4/3/2019    Remote, rt. cerebellum, head CT scan, 1/9/19    Chronic back pain     Chronic kidney disease     Chronic systolic congestive heart failure ([address] 10/4/2013    Chronic venous insufficiency 10/11/2017    COPD (chronic obstructive pulmonary disease) ([address]     Depression     Diabetes mellitus ([address]     Diabetic neuropathy ([address]     Diverticulosis     Fatty liver 1/8/2016    per US    Glaucoma, open angle 2/1/2016    Mild-OU    Hepatic encephalopathy ([address] 02/07/2016    resolved    Hiatal hernia     History of blood transfusion     Hyperlipidemia     Hyperplastic colon polyp     Hypertension     Incontinence     Liver mass     Morbid obesity ([address]     Movement disorder     Myocardial infarction ([address] 2011    Osteoarthritis, generalized     Pain in both lower legs 10/11/2017    Peripheral vascular disease ([address]     Pneumonia 1/26/2014    Pulmonary edema     resolved    PVD (peripheral vascular disease) with claudication ([address] 8/30/2017    Sleep apnea     uses BI PAP    Status post peripheral artery angioplasty 10/31/2019    Tobacco abuse     Tobacco abuse 10/11/2017    Tubular adenoma polyp of rectum     Urinary tract infection due to ESBL Klebsiella 03/08/2017    Ventral hernia        Past Surgical History:   Procedure Laterality Date    ANGIOPLASTY  11/13/2018    Dr. Monique Mac & athrectomy L SFA & Popliteal    BREAST SURGERY  2000    bilateral reduction    BRONCHIAL BRUSH BIOPSY  1/25/2013    Dr Jas Acevedo CATHETERIZATION  04/20/2015    Dr. Isaiah Perez  12/2011     DR. Odilia White,  follows Dr Cassie Alejandre  11/15/2013    Dr Anabel Gore COLONOSCOPY  12/23/2016    Dr Iesha Crum adenoma & hyperplastic polyps, melanosis coli (repeat one year 12/2017)    CORONARY ARTERY BYPASS GRAFT      ECHO COMPLETE  10/9/2013         ENDOSCOPY, COLON, DIAGNOSTIC  04/18/2017    GALLBLADDER SURGERY      gallstones removed, [facility] 8/2017    HYSTERECTOMY      JOINT REPLACEMENT  2011    LEFT KNEE    KNEE SURGERY      left knee replacement    LAYER WOUND CLOSURE Left [id number]    LIVER BIOPSY  1/8/2016    [facility]    POLYSOMNOGRAPHY  1/2016    [facility]    UPPER GASTROINTESTINAL ENDOSCOPY  12/20/12    UPPER GASTROINTESTINAL ENDOSCOPY  12/23/2016    Dr Anabel Gore UPPER GASTROINTESTINAL ENDOSCOPY  02/08/2017       Social History     Socioeconomic History    Marital status:      Spouse name: Not on file    Number of children: 2    Years of education: Not on file    Highest education level: Not on file   Occupational History    Occupation: disability   Social Needs    Financial resource strain: Not on file    Food insecurity     Worry: Not on file     Inability: Not on file   [employer] needs     Medical: Not on file     Non-medical: Not on file   Tobacco Use    Smoking status: Current Every Day Smoker     Packs/day: 0.25     Years: 40.00     Pack years: 10.00     Types: Cigarettes     Start date: 8/10/1974    Smokeless tobacco: Never Used    Tobacco comment: 1 pack every 3 days (6-7 cig)   Substance and Sexual Activity    Alcohol use:  Yes     Alcohol/week: 0.0 standard drinks     Comment: social    Drug use: Yes     Types: Marijuana     Comment: \"every 4th or 5th day out of the week\"    Sexual activity: Never   Lifestyle    Physical activity     Days per week: Not on file     Minutes per session: Not on file    Stress: Not on file   Relationships    Social connections     Talks on phone: Not on file     Gets together: Not on file     Attends Jew service: Not on file     Active member of club or organization: Not on file     Attends meetings of clubs or organizations: Not on file     Relationship status: Not on file    Intimate partner violence     Fear of current or ex partner: Not on file     Emotionally abused: Not on file     Physically abused: Not on file     Forced sexual activity: Not on file   Other Topics Concern    Not on file   Social History Narrative    Pt lives with brother in her house-pt has never driven a car and depends on transportation       Family History   Problem Relation Age of Onset    Cancer Mother     Asthma Father           Current Outpatient Medications   Medication Sig Dispense Refill    [facility] GAS RELIEF ULTRA STRENGTH 180 MG CAPS TAKE 1 CAPSULE BY MOUTH TWICE A DAY      metoclopramide (REGLAN) 5 MG tablet TAKE 1 TABLET BY MOUTH BEFORE LUNCH, 1 TABLET BEFORE DINNER AND 2 TAB S AT BEDTIME      ROBAFEN 100 MG/5ML syrup TAKE 20 MLS BY MOUTH 3 TIMES DAILY AS NEEDED FOR COUGH      ciclopirox (LOPROX) 0.77 % cream APPLY TOPICALLY 2 TIMES A DAY      Boswellia-Glucosamine-Vit D (OSTEO BI-FLEX ONE PER DAY PO) Take by mouth      calcium carbonate (OSCAL) 500 MG TABS tablet Take 500 mg by mouth daily      magnesium oxide (MAG-OX) 400 MG tablet Take 400 mg by mouth daily      zinc 50 MG TABS tablet Take 50 mg by mouth daily      vitamin D3 (CHOLECALCIFEROL) 25 MCG (1000 UT) TABS tablet TAKE 1 TABLET BY MOUTH EVERY DAY 90 tablet 1    SPIRIVA RESPIMAT 1.25 MCG/ACT AERS inhaler INHALE TWO (2) PUFFS BY MOUTH EVERY DAY 1 Inhaler 5    metoprolol succinate (TOPROL XL) 25 MG extended release tablet Take 1 tablet by mouth 2 times daily 180 tablet 3    isosorbide mononitrate (IMDUR) 60 MG extended release tablet TAKE (1) TABLET BY MOUTH DAILY 90 tablet 3    MYRBETRIQ 50 MG TB24 TAKE 1 TABLET BY MOUTH EVERY DAY 90 tablet 1    cetirizine (ZYRTEC) 10 MG tablet TAKE 1 TABLET BY MOUTH EVERY DAY 90 tablet 1    baclofen (LIORESAL) 10 MG tablet TAKE 1 TABLET BY MOUTH TWICE A DAY AS NEEDED 180 tablet 1    B-D UF III MINI PEN NEEDLES 31G X 5 MM MISC USE AS DIRECTED 100 each 11    bumetanide (BUMEX) 1 MG tablet TAKE (1) TABLET BY MOUTH DAILY 30 tablet 10    traZODone (DESYREL) 100 MG tablet TAKE (1) TABLET BY MOUTH NIGHTLY 30 tablet 10    DULoxetine (CYMBALTA) 60 MG extended release capsule TAKE ONE (1) CAPSULE BY MOUTH TWICE DAILY 180 capsule 1    montelukast (SINGULAIR) 10 MG tablet TAKE 1 TABLET BY MOUTH NIGHTLY 90 tablet 1    simvastatin (ZOCOR) 20 MG tablet TAKE 1 TABLET BY MOUTH NIGHTLY 90 tablet 3    azelastine (ASTELIN) 0.1 % nasal spray 1 spray by Nasal route 2 times daily Use in each nostril as directed 1 Bottle 3    ENTRESTO 24-26 MG per tablet TAKE ONE (1) TABLET BY MOUTH TWICE DAILY 180 tablet 3    oxybutynin (DITROPAN-XL) 10 MG extended release tablet       ondansetron (ZOFRAN) 4 MG tablet TAKE 1 TABLET BY MOUTH EVERY 6 HOURS AS NEEDED FOR NAUSEA      Insulin Degludec (TRESIBA FLEXTOUCH) 200 UNIT/ML SOPN INJECT 60 UNITS INTO THE SKIN BID 10 pen 2    blood glucose test strips (ACCU-CHEK GUIDE) strip 1 each by In Vitro route 3 times daily 300 each 11    Handicap Placard [facility] [place] by Does not apply route Patient cannot walk 200 ft without stopping to rest.    Expiration 8/2022 1 each 0    dicyclomine (BENTYL) 10 MG capsule TAKE 1 CAPSULE BY MOUTH TWICE A DAY      aspirin (ASPIRIN LOW DOSE) 81 MG chewable tablet TAKE 1 TABLET BY MOUTH EVERY DAY 90 tablet 1    fluticasone (FLONASE) 50 MCG/ACT nasal spray 1 spray by Nasal route 2 times daily 3 Bottle 1    lidocaine (XYLOCAINE) 5 % ointment APPLY TOPICALLY AS NEEDED FOR PAIN 35.44 g 11    meloxicam (MOBIC) 7.5 MG tablet TAKE 1 TABLET BY MOUTH EVERY DAY 90 tablet 1    nitroGLYCERIN (NITROSTAT) 0.4 MG SL tablet up to max of 3 total doses.  If no relief after 1 dose, call [phone] for back pain and myalgias. Skin: Negative for rash. Allergic/Immunologic: Positive for environmental allergies. Negative for food allergies. Neurological: Negative for dizziness, tremors, syncope, light-headedness and headaches. Hematological: Negative for adenopathy. Does not bruise/bleed easily. Psychiatric/Behavioral: Positive for sleep disturbance. Negative for behavioral problems and dysphoric mood. The patient is not nervous/anxious. PHYSICAL EXAM  LMP 01/01/1990   Physical Exam  Vitals signs reviewed. Constitutional:       Appearance: Normal appearance. She is obese. HENT:      Head: Normocephalic. Nose: Nose normal.   Eyes:      Extraocular Movements: Extraocular movements intact. Conjunctiva/sclera: Conjunctivae normal.   Neck:      Musculoskeletal: Normal range of motion. Pulmonary:      Effort: Pulmonary effort is normal.   Musculoskeletal: Normal range of motion. Skin:     Findings: No rash. Neurological:      General: No focal deficit present. Mental Status: She is alert and oriented to person, place, and time. Mental status is at baseline. Psychiatric:         Mood and Affect: Mood normal.         Behavior: Behavior normal.         Thought Content: Thought content normal.         Judgment: Judgment normal.              Laboratory:   All laboratory and radiology results have been personally reviewed by myself    Lab Results   Component Value Date     06/16/2020    K 4.4 06/16/2020    K 5.4 09/20/2019    CL 99 06/16/2020    CO2 29 06/16/2020    BUN 25 06/16/2020    CREATININE 1.1 06/16/2020    PROT 7.8 06/16/2020    LABALBU 4.3 06/16/2020    LABALBU 5.2 12/01/2011    CALCIUM 9.8 06/16/2020    GFRAA >60 06/16/2020    LABGLOM >60 06/16/2020    GLUCOSE 190 06/16/2020    GLUCOSE 181 12/01/2011    AST 12 06/16/2020    ALT 22 06/16/2020    ALKPHOS 86 06/16/2020    BILITOT 0.4 06/16/2020    TSH 0.396 06/16/2020    VITD25 33 06/16/2020    CHOL 162 06/16/2020 TRIG 89 06/16/2020    HDL 46 06/16/2020    LDLCALC 98 06/16/2020    LABA1C 6.5 06/16/2020        Lab Results   Component Value Date    CHOL 162 06/16/2020    CHOL 222 (H) 06/05/2018    CHOL 159 12/01/2016     Lab Results   Component Value Date    TRIG 89 06/16/2020    TRIG 84 06/05/2018    TRIG 215 (H) 12/01/2016     Lab Results   Component Value Date    HDL 46 06/16/2020    HDL 38 06/05/2018    HDL 27 12/01/2016     Lab Results   Component Value Date    LDLCALC 98 06/16/2020    LDLCALC 167 (H) 06/05/2018    LDLCALC 89 12/01/2016       Lab Results   Component Value Date    LABA1C 6.5 (H) 06/16/2020    LABA1C 6.5 01/14/2020    LABA1C 5.8 06/26/2019     Lab Results   Component Value Date    LABMICR 82.0 (H) 12/01/2016    LDLCALC 98 06/16/2020    CREATININE 1.1 (H) 06/16/2020       ASSESSMENT/PLAN:     Diagnosis Orders   1. Chronic respiratory failure with hypoxia and hypercapnia (HCC)  TSH without Reflex   2. Type 2 diabetes mellitus with diabetic peripheral angiopathy without gangrene, with long-term current use of insulin (HCC)  CBC Auto Differential    Comprehensive Metabolic Panel    Lipid Panel    TSH without Reflex    MICROALBUMIN / CREATININE URINE RATIO    HEMOGLOBIN A1C   3. DAYAN and COPD overlap syndrome (HCC)  TSH without Reflex   4. Essential hypertension     5. Vitamin D deficiency  Vitamin D 25 Hydroxy     Labs to be completed at patient's convenience. Will decrease basal insulin to 50 units twice daily. Patient understands that her sugar should be in the 100s and not go below this. Patient understands and is agreeable. We will continue all other medications at this time. Continue to follow with GI and follow recommendations as directed.   We again discussed smoking cessation patient states she is too stressed to quit smoking at this time    Problem list reviewed andsimplified/updated  HM reviewed today and counseled as appropriate    Call or go to ED immediately if symptoms worsen or persist.  Future Appointments   Date Time Provider Melanie Pateli   12/10/2020  3:15 PM Nova Aquino MD [facility]   4/1/2021  2:30 PM DO JUSTA [facility] - BEHAVIORAL HEALTH SERVICES ENT [facility]     Or sooner if necessary. Educational materials and/or homeexercises printed for patient's review and were included in patient instructions on his/her After Visit Summary and given to patient at the end of visit. Counseled regarding above diagnosis, including possible risks and complications,  especially if left uncontrolled. Counseled regarding the possible side effects, risks, benefits and alternatives to treatment; patient and/or guardian verbalizes understanding, agrees,feels comfortable with and wishes to proceed with above treatment plan. Advised patient to call [facility] new medication issues, and read all Rx info from pharmacy to assure aware of all possible risks and side effects of medication before taking. Reviewed age and gender appropriate health screening exams and vaccinations. Advised patient regarding importance of keeping up with recommended health maintenance and toschedule as soon as possible if overdue, as this is important in assessing for undiagnosed pathology, especially cancer, as well as protecting against potentially harmful/life threatening disease. Patient and/or guardian verbalizes understanding and agrees with above counseling, assessment and plan. All questions answered. Franklin Das DO  12/1/20    NOTE: This report was transcribed using voice recognition software. Every effort was made to ensure accuracy; however, inadvertent computerized transcription errors may be present    Jenni Marrero is a 71 y.o. female being evaluated by a Virtual Visit (video visit) encounter to address concerns as mentioned above. A caregiver was present when appropriate.  Due to this being a TeleHealth encounter (During [place] [address] public health emergency), evaluation of the following organ systems was limited: Vitals/Constitutional/EENT/Resp/CV/GI//MS/Neuro/Skin/Heme-Lymph-Imm. Pursuant to the emergency declaration under the [address] [place] [address] authority and the [employer] and [employer] Act, this Virtual Visit was conducted with patient's (and/or legal guardian's) consent, to reduce the patient's risk of exposure to COVID-19 and provide necessary medical care. The patient (and/or legal guardian) has also been advised to contact this office for worsening conditions or problems, and seek emergency medical treatment and/or call 911 if deemed necessary.

## 2020-12-02 ASSESSMENT — ENCOUNTER SYMPTOMS
COUGH: 1
NAUSEA: 0
ABDOMINAL PAIN: 0
RHINORRHEA: 0
VOMITING: 0
EYE PAIN: 0
DIARRHEA: 0
RECTAL PAIN: 0
SINUS PRESSURE: 0
BACK PAIN: 0
CONSTIPATION: 1
SINUS PAIN: 0
SHORTNESS OF BREATH: 1
SORE THROAT: 0
TROUBLE SWALLOWING: 0

## 2020-12-09 ENCOUNTER — TELEPHONE (OUTPATIENT)
Dept: VASCULAR SURGERY | Age: 65
End: 2020-12-09

## 2020-12-10 ENCOUNTER — OFFICE VISIT (OUTPATIENT)
Dept: VASCULAR SURGERY | Age: 65
End: 2020-12-10
Payer: MEDICARE

## 2020-12-10 VITALS — HEIGHT: 62 IN | BODY MASS INDEX: 39.56 KG/M2 | WEIGHT: 215 LBS | RESPIRATION RATE: 16 BRPM

## 2020-12-10 PROCEDURE — 1123F ACP DISCUSS/DSCN MKR DOCD: CPT | Performed by: SURGERY

## 2020-12-10 PROCEDURE — G8417 CALC BMI ABV UP PARAM F/U: HCPCS | Performed by: SURGERY

## 2020-12-10 PROCEDURE — G8427 DOCREV CUR MEDS BY ELIG CLIN: HCPCS | Performed by: SURGERY

## 2020-12-10 PROCEDURE — 3017F COLORECTAL CA SCREEN DOC REV: CPT | Performed by: SURGERY

## 2020-12-10 PROCEDURE — 99213 OFFICE O/P EST LOW 20 MIN: CPT | Performed by: SURGERY

## 2020-12-10 PROCEDURE — 4004F PT TOBACCO SCREEN RCVD TLK: CPT | Performed by: SURGERY

## 2020-12-10 PROCEDURE — 1090F PRES/ABSN URINE INCON ASSESS: CPT | Performed by: SURGERY

## 2020-12-10 PROCEDURE — 4040F PNEUMOC VAC/ADMIN/RCVD: CPT | Performed by: SURGERY

## 2020-12-10 PROCEDURE — G8399 PT W/DXA RESULTS DOCUMENT: HCPCS | Performed by: SURGERY

## 2020-12-10 PROCEDURE — G8484 FLU IMMUNIZE NO ADMIN: HCPCS | Performed by: SURGERY

## 2020-12-11 ENCOUNTER — TELEPHONE (OUTPATIENT)
Dept: FAMILY MEDICINE CLINIC | Age: 65
End: 2020-12-11

## 2020-12-11 NOTE — TELEPHONE ENCOUNTER
Pt states her ear and throat are also hurting. I advised her since she did say she was struggling to seek medical treatment at ER pt refused. Pt states I just need dr. Mayelin Raygoza to send me in an ATB. I did advise you were out of office til Tuesday.

## 2020-12-11 NOTE — TELEPHONE ENCOUNTER
Pt called she still has a constant cough she would like med and antibiotic if possible she is having a hard time [phone]

## 2020-12-14 NOTE — TELEPHONE ENCOUNTER
Spoke to pt; she states it is not in her chest, it is all in her head/ states she has a sinus infection. +cough  +SOB (not as bad as Friday)  +headache  +loss of appetite   Denies fever, body aches, loss of taste or smell.

## 2020-12-28 ENCOUNTER — TELEPHONE (OUTPATIENT)
Dept: FAMILY MEDICINE CLINIC | Age: 65
End: 2020-12-28

## 2020-12-28 RX ORDER — BLOOD SUGAR DIAGNOSTIC
1 STRIP MISCELLANEOUS 3 TIMES DAILY
Qty: 300 EACH | Refills: 11 | Status: SHIPPED
Start: 2020-12-28 | End: 2020-12-29 | Stop reason: SDUPTHER

## 2020-12-28 RX ORDER — LANCETS
EACH MISCELLANEOUS
Qty: 100 EACH | Refills: 2 | Status: SHIPPED
Start: 2020-12-28 | End: 2021-07-30

## 2020-12-28 NOTE — TELEPHONE ENCOUNTER
Last Appointment:  12/1/2020  Future Appointments   Date Time Provider Melanie Sellers   2/5/2021  2:00 PM [facility] VAS [facility]   4/1/2021  2:30 [facility]   12/16/2021  2:30 PM Trudi Dow MD [facility]

## 2020-12-28 NOTE — TELEPHONE ENCOUNTER
Pt calling and asking for insulin supplies for her Accucheck meter, she needs needles, alcohol swabs and and the sticks. [facility]ses [facility].

## 2020-12-29 ENCOUNTER — TELEPHONE (OUTPATIENT)
Dept: FAMILY MEDICINE CLINIC | Age: 65
End: 2020-12-29

## 2020-12-29 RX ORDER — BLOOD SUGAR DIAGNOSTIC
1 STRIP MISCELLANEOUS 3 TIMES DAILY
Qty: 300 EACH | Refills: 11 | Status: SHIPPED
Start: 2020-12-29 | End: 2021-07-30

## 2020-12-29 NOTE — TELEPHONE ENCOUNTER
[facility] they need ICD 10 codes on scripts in order to fill please resend corrected scripts on Accu check test strips and the 70 percent alcohol wipes

## 2020-12-30 RX ORDER — OXYBUTYNIN CHLORIDE 10 MG/1
TABLET, EXTENDED RELEASE ORAL
Qty: 90 TABLET | Refills: 1 | OUTPATIENT
Start: 2020-12-30

## 2021-01-04 ENCOUNTER — TELEPHONE (OUTPATIENT)
Dept: FAMILY MEDICINE CLINIC | Age: 66
End: 2021-01-04

## 2021-01-04 NOTE — TELEPHONE ENCOUNTER
Last Appointment:  12/1/2020  Future Appointments   Date Time Provider Melanie Sellers   2/5/2021  2:00 PM [facility] VAS [facility]   4/1/2021  2:30 PM DO Shanda Rand  ENT [facility]   12/16/2021  2:30 PM Lise Ac MD [facility]

## 2021-01-04 NOTE — TELEPHONE ENCOUNTER
Pt called states her medical marijuana card is going to  and wants to know if Dr Rad Taylor can help her renew this by supplying diagnostic code for the pt, call [phone]

## 2021-01-05 ENCOUNTER — TELEPHONE (OUTPATIENT)
Dept: FAMILY MEDICINE CLINIC | Age: 66
End: 2021-01-05

## 2021-01-05 RX ORDER — FLUTICASONE PROPIONATE 50 MCG
SPRAY, SUSPENSION (ML) NASAL
Qty: 1 BOTTLE | Refills: 1 | Status: SHIPPED
Start: 2021-01-05 | End: 2021-01-27

## 2021-01-05 RX ORDER — BENZONATATE 100 MG/1
100 CAPSULE ORAL 3 TIMES DAILY PRN
Qty: 30 CAPSULE | Refills: 0 | Status: SHIPPED | OUTPATIENT
Start: 2021-01-05 | End: 2021-01-12

## 2021-01-05 RX ORDER — ASPIRIN 81 MG/1
TABLET, CHEWABLE ORAL
Qty: 90 TABLET | Refills: 1 | Status: SHIPPED
Start: 2021-01-05 | End: 2021-06-25

## 2021-01-05 NOTE — TELEPHONE ENCOUNTER
Last Appointment:  12/1/2020  Future Appointments   Date Time Provider Melanie Sellers   2/5/2021  2:00 PM [facility] VAS  [facility]   4/1/2021  2:30 PM DO Karin Mast ENT [facility]   12/16/2021  2:30 PM Edith Ramsey MD [facility]

## 2021-01-05 NOTE — TELEPHONE ENCOUNTER
Notified patient to cancel appointment that was not needed , she said all she wanted was something to be sent into pharmacy for her ongoing cough.  Please advise

## 2021-01-05 NOTE — TELEPHONE ENCOUNTER
Patient called and notified of note per PCP, patient stated she just needed PCP to gv her a Dx code for why she is getting medical marijuana, I explained to pt per PCP she needs to go bk to the provider whom approved her for card. Patient got upset stated she just needs a Dx code for arthritis, which is why she is getting the medical marijuana. Tried to explain to pt that out office don't participate in medical marijuana approval for patients, and that any Dx code needed she should be able to receive from prescribing physician.   Patient demanded a appt with PCP, pt transferred to  staff to schedule

## 2021-01-12 ENCOUNTER — TELEPHONE (OUTPATIENT)
Dept: FAMILY MEDICINE CLINIC | Age: 66
End: 2021-01-12

## 2021-01-27 RX ORDER — FLUTICASONE PROPIONATE 50 MCG
SPRAY, SUSPENSION (ML) NASAL
Qty: 1 BOTTLE | Refills: 1 | Status: SHIPPED
Start: 2021-01-27 | End: 2021-02-23

## 2021-02-02 ENCOUNTER — TELEPHONE (OUTPATIENT)
Dept: FAMILY MEDICINE CLINIC | Age: 66
End: 2021-02-02

## 2021-02-04 ENCOUNTER — TELEPHONE (OUTPATIENT)
Dept: VASCULAR SURGERY | Age: 66
End: 2021-02-04

## 2021-02-05 ENCOUNTER — HOSPITAL ENCOUNTER (OUTPATIENT)
Dept: CARDIOLOGY | Age: 66
Discharge: HOME | End: 2021-02-05
Payer: MEDICARE

## 2021-02-05 ENCOUNTER — HOSPITAL ENCOUNTER (OUTPATIENT)
Age: 66
Discharge: HOME | End: 2021-02-05
Payer: MEDICARE

## 2021-02-05 DIAGNOSIS — G47.33 OSA AND COPD OVERLAP SYNDROME (HCC): ICD-10-CM

## 2021-02-05 DIAGNOSIS — Z79.4 TYPE 2 DIABETES MELLITUS WITH DIABETIC PERIPHERAL ANGIOPATHY WITHOUT GANGRENE, WITH LONG-TERM CURRENT USE OF INSULIN (HCC): ICD-10-CM

## 2021-02-05 DIAGNOSIS — I73.9 PVD (PERIPHERAL VASCULAR DISEASE) WITH CLAUDICATION (HCC): ICD-10-CM

## 2021-02-05 DIAGNOSIS — J44.9 OSA AND COPD OVERLAP SYNDROME (HCC): ICD-10-CM

## 2021-02-05 DIAGNOSIS — J96.11 CHRONIC RESPIRATORY FAILURE WITH HYPOXIA AND HYPERCAPNIA (HCC): ICD-10-CM

## 2021-02-05 DIAGNOSIS — E11.51 TYPE 2 DIABETES MELLITUS WITH DIABETIC PERIPHERAL ANGIOPATHY WITHOUT GANGRENE, WITH LONG-TERM CURRENT USE OF INSULIN (HCC): ICD-10-CM

## 2021-02-05 DIAGNOSIS — E55.9 VITAMIN D DEFICIENCY: ICD-10-CM

## 2021-02-05 DIAGNOSIS — J96.12 CHRONIC RESPIRATORY FAILURE WITH HYPOXIA AND HYPERCAPNIA (HCC): ICD-10-CM

## 2021-02-05 LAB
ALBUMIN SERPL-MCNC: 3.8 G/DL (ref 3.5–5.2)
ALP BLD-CCNC: 88 U/L (ref 35–104)
ALT SERPL-CCNC: 20 U/L (ref 0–32)
ANION GAP SERPL CALCULATED.3IONS-SCNC: 7 MMOL/L (ref 7–16)
AST SERPL-CCNC: 13 U/L (ref 0–31)
BASOPHILS ABSOLUTE: 0.02 E9/L (ref 0–0.2)
BASOPHILS RELATIVE PERCENT: 0.3 % (ref 0–2)
BILIRUB SERPL-MCNC: 0.3 MG/DL (ref 0–1.2)
BUN BLDV-MCNC: 27 MG/DL (ref 8–23)
CALCIUM SERPL-MCNC: 9.2 MG/DL (ref 8.6–10.2)
CHLORIDE BLD-SCNC: 102 MMOL/L (ref 98–107)
CHOLESTEROL, TOTAL: 131 MG/DL (ref 0–199)
CO2: 32 MMOL/L (ref 22–29)
CREAT SERPL-MCNC: 1.2 MG/DL (ref 0.5–1)
EOSINOPHILS ABSOLUTE: 0.17 E9/L (ref 0.05–0.5)
EOSINOPHILS RELATIVE PERCENT: 2.1 % (ref 0–6)
GFR AFRICAN AMERICAN: 54
GFR NON-AFRICAN AMERICAN: 54 ML/MIN/1.73
GLUCOSE BLD-MCNC: 144 MG/DL (ref 74–99)
HBA1C MFR BLD: 6.7 % (ref 4–5.6)
HCT VFR BLD CALC: 43 % (ref 34–48)
HDLC SERPL-MCNC: 27 MG/DL
HEMOGLOBIN: 13.9 G/DL (ref 11.5–15.5)
IMMATURE GRANULOCYTES #: 0.03 E9/L
IMMATURE GRANULOCYTES %: 0.4 % (ref 0–5)
LDL CHOLESTEROL CALCULATED: 60 MG/DL (ref 0–99)
LYMPHOCYTES ABSOLUTE: 1.2 E9/L (ref 1.5–4)
LYMPHOCYTES RELATIVE PERCENT: 15 % (ref 20–42)
MCH RBC QN AUTO: 32.7 PG (ref 26–35)
MCHC RBC AUTO-ENTMCNC: 32.3 % (ref 32–34.5)
MCV RBC AUTO: 101.2 FL (ref 80–99.9)
MONOCYTES ABSOLUTE: 0.38 E9/L (ref 0.1–0.95)
MONOCYTES RELATIVE PERCENT: 4.8 % (ref 2–12)
NEUTROPHILS ABSOLUTE: 6.18 E9/L (ref 1.8–7.3)
NEUTROPHILS RELATIVE PERCENT: 77.4 % (ref 43–80)
PDW BLD-RTO: 12.3 FL (ref 11.5–15)
PLATELET # BLD: 248 E9/L (ref 130–450)
PMV BLD AUTO: 10.6 FL (ref 7–12)
POTASSIUM SERPL-SCNC: 4.3 MMOL/L (ref 3.5–5)
RBC # BLD: 4.25 E12/L (ref 3.5–5.5)
SODIUM BLD-SCNC: 141 MMOL/L (ref 132–146)
TOTAL PROTEIN: 6.8 G/DL (ref 6.4–8.3)
TRIGL SERPL-MCNC: 219 MG/DL (ref 0–149)
TSH SERPL DL<=0.05 MIU/L-ACNC: 0.65 UIU/ML (ref 0.27–4.2)
VITAMIN D 25-HYDROXY: 29 NG/ML (ref 30–100)
VLDLC SERPL CALC-MCNC: 44 MG/DL
WBC # BLD: 8 E9/L (ref 4.5–11.5)

## 2021-02-05 PROCEDURE — 82570 ASSAY OF URINE CREATININE: CPT

## 2021-02-05 PROCEDURE — 80061 LIPID PANEL: CPT

## 2021-02-05 PROCEDURE — 93923 UPR/LXTR ART STDY 3+ LVLS: CPT

## 2021-02-05 PROCEDURE — 83036 HEMOGLOBIN GLYCOSYLATED A1C: CPT

## 2021-02-05 PROCEDURE — 84443 ASSAY THYROID STIM HORMONE: CPT

## 2021-02-05 PROCEDURE — 82306 VITAMIN D 25 HYDROXY: CPT

## 2021-02-05 PROCEDURE — 80053 COMPREHEN METABOLIC PANEL: CPT

## 2021-02-05 PROCEDURE — 82044 UR ALBUMIN SEMIQUANTITATIVE: CPT

## 2021-02-05 PROCEDURE — 85025 COMPLETE CBC W/AUTO DIFF WBC: CPT

## 2021-02-05 PROCEDURE — 36415 COLL VENOUS BLD VENIPUNCTURE: CPT

## 2021-02-06 LAB
CREATININE URINE: 53 MG/DL (ref 29–226)
MICROALBUMIN UR-MCNC: 116.9 MG/L
MICROALBUMIN/CREAT UR-RTO: 220.6 (ref 0–30)

## 2021-02-08 NOTE — PROCEDURES
[address]                  [address] [facility],  [facility]                                VASCULAR REPORT    PATIENT NAME: Yennifer Squires                  :        1955  MED REC NO:   [id number]                            ROOM:  ACCOUNT NO:   [de-identified]                           ADMIT DATE: 2021  PROVIDER:     Mayi Silvestre MD    DATE OF PROCEDURE:  2021    LOWER EXTREMITY ARTERIAL DOPPLER STUDY    INDICATION:  Difficulty walking. FINDINGS:  Lower extremity arterial Doppler study revealed evidence of  right iliac artery occlusive disease, with an ankle-brachial index of  0.64 on the right and on the left side, the ankle-brachial index is  within the normal range of 0.93. IMPRESSION:  No significant change noted compared to last year. Yobani Yeh MD    D: 2021 18:26:48       T: 2021 18:29:09     [id number]  Job#: [id number]     Doc#: [id number]    CC:  Mayito Meneses.  DO Freddy

## 2021-02-09 ENCOUNTER — TELEPHONE (OUTPATIENT)
Dept: VASCULAR SURGERY | Age: 66
End: 2021-02-09

## 2021-02-09 ENCOUNTER — TELEPHONE (OUTPATIENT)
Dept: FAMILY MEDICINE CLINIC | Age: 66
End: 2021-02-09

## 2021-02-09 NOTE — TELEPHONE ENCOUNTER
Called to notify patient no change in arterial doppler study of legs, keep next appointment for 12/16/21.

## 2021-02-09 NOTE — TELEPHONE ENCOUNTER
----- Message from Delma Posada MD sent at 2/7/2021  6:27 PM EST -----  Please notify pt  :No change in the arterial doppler study of the legs, keep the next appt

## 2021-02-18 RX ORDER — MELOXICAM 7.5 MG/1
TABLET ORAL
Qty: 90 TABLET | Refills: 1 | Status: SHIPPED
Start: 2021-02-18 | End: 2021-05-05 | Stop reason: ALTCHOICE

## 2021-02-23 ENCOUNTER — HOSPITAL ENCOUNTER (OUTPATIENT)
Dept: GENERAL RADIOLOGY | Age: 66
Discharge: HOME | End: 2021-02-25
Payer: MEDICARE

## 2021-02-23 DIAGNOSIS — Z12.31 SCREENING MAMMOGRAM, ENCOUNTER FOR: ICD-10-CM

## 2021-02-23 PROCEDURE — 77063 BREAST TOMOSYNTHESIS BI: CPT

## 2021-02-23 RX ORDER — FLUTICASONE PROPIONATE 50 MCG
SPRAY, SUSPENSION (ML) NASAL
Qty: 1 BOTTLE | Refills: 1 | Status: SHIPPED
Start: 2021-02-23 | End: 2021-03-23

## 2021-02-23 RX ORDER — HYDRALAZINE HYDROCHLORIDE 50 MG/1
TABLET, FILM COATED ORAL
Qty: 270 TABLET | Refills: 3 | Status: ON HOLD
Start: 2021-02-23 | End: 2021-06-16 | Stop reason: HOSPADM

## 2021-02-24 ENCOUNTER — TELEPHONE (OUTPATIENT)
Dept: FAMILY MEDICINE CLINIC | Age: 66
End: 2021-02-24

## 2021-02-24 NOTE — TELEPHONE ENCOUNTER
Kidney function stable, A1c at 6.7 which is stable. Already discussed with patient see note from 2/9.

## 2021-03-12 ENCOUNTER — TELEPHONE (OUTPATIENT)
Dept: FAMILY MEDICINE CLINIC | Age: 66
End: 2021-03-12

## 2021-03-12 ENCOUNTER — HOSPITAL ENCOUNTER (OUTPATIENT)
Age: 66
Setting detail: OBSERVATION
Discharge: HOME | End: 2021-03-13
Attending: STUDENT IN AN ORGANIZED HEALTH CARE EDUCATION/TRAINING PROGRAM | Admitting: INTERNAL MEDICINE
Payer: MEDICARE

## 2021-03-12 ENCOUNTER — APPOINTMENT (OUTPATIENT)
Dept: GENERAL RADIOLOGY | Age: 66
End: 2021-03-12
Payer: MEDICARE

## 2021-03-12 DIAGNOSIS — R94.31 NONSPECIFIC ST-T WAVE ELECTROCARDIOGRAPHIC CHANGES: ICD-10-CM

## 2021-03-12 DIAGNOSIS — I16.0 HYPERTENSIVE URGENCY: Primary | ICD-10-CM

## 2021-03-12 PROBLEM — I10 UNCONTROLLED HYPERTENSION: Status: ACTIVE | Noted: 2021-03-12

## 2021-03-12 LAB
ANION GAP SERPL CALCULATED.3IONS-SCNC: 8 MMOL/L (ref 7–16)
BASOPHILS ABSOLUTE: 0.01 E9/L (ref 0–0.2)
BASOPHILS RELATIVE PERCENT: 0.1 % (ref 0–2)
BUN BLDV-MCNC: 14 MG/DL (ref 8–23)
CALCIUM SERPL-MCNC: 9.3 MG/DL (ref 8.6–10.2)
CHLORIDE BLD-SCNC: 102 MMOL/L (ref 98–107)
CO2: 30 MMOL/L (ref 22–29)
CREAT SERPL-MCNC: 0.9 MG/DL (ref 0.5–1)
EKG ATRIAL RATE: 105 BPM
EKG P AXIS: 62 DEGREES
EKG P-R INTERVAL: 172 MS
EKG Q-T INTERVAL: 376 MS
EKG QRS DURATION: 140 MS
EKG QTC CALCULATION (BAZETT): 496 MS
EKG R AXIS: 68 DEGREES
EKG T AXIS: 41 DEGREES
EKG VENTRICULAR RATE: 105 BPM
EOSINOPHILS ABSOLUTE: 0.13 E9/L (ref 0.05–0.5)
EOSINOPHILS RELATIVE PERCENT: 1.6 % (ref 0–6)
GFR AFRICAN AMERICAN: >60
GFR NON-AFRICAN AMERICAN: >60 ML/MIN/1.73
GLUCOSE BLD-MCNC: 142 MG/DL (ref 74–99)
HCT VFR BLD CALC: 47.4 % (ref 34–48)
HEMOGLOBIN: 15.1 G/DL (ref 11.5–15.5)
IMMATURE GRANULOCYTES #: 0.01 E9/L
IMMATURE GRANULOCYTES %: 0.1 % (ref 0–5)
LYMPHOCYTES ABSOLUTE: 1.27 E9/L (ref 1.5–4)
LYMPHOCYTES RELATIVE PERCENT: 15.7 % (ref 20–42)
MCH RBC QN AUTO: 32.3 PG (ref 26–35)
MCHC RBC AUTO-ENTMCNC: 31.9 % (ref 32–34.5)
MCV RBC AUTO: 101.3 FL (ref 80–99.9)
MONOCYTES ABSOLUTE: 0.39 E9/L (ref 0.1–0.95)
MONOCYTES RELATIVE PERCENT: 4.8 % (ref 2–12)
NEUTROPHILS ABSOLUTE: 6.26 E9/L (ref 1.8–7.3)
NEUTROPHILS RELATIVE PERCENT: 77.7 % (ref 43–80)
PDW BLD-RTO: 12.4 FL (ref 11.5–15)
PLATELET # BLD: 268 E9/L (ref 130–450)
PMV BLD AUTO: 10.8 FL (ref 7–12)
POTASSIUM SERPL-SCNC: 4.1 MMOL/L (ref 3.5–5)
PRO-BNP: 360 PG/ML (ref 0–125)
RBC # BLD: 4.68 E12/L (ref 3.5–5.5)
SODIUM BLD-SCNC: 140 MMOL/L (ref 132–146)
TROPONIN: <0.01 NG/ML (ref 0–0.03)
WBC # BLD: 8.1 E9/L (ref 4.5–11.5)

## 2021-03-12 PROCEDURE — 93005 ELECTROCARDIOGRAM TRACING: CPT | Performed by: STUDENT IN AN ORGANIZED HEALTH CARE EDUCATION/TRAINING PROGRAM

## 2021-03-12 PROCEDURE — 96361 HYDRATE IV INFUSION ADD-ON: CPT

## 2021-03-12 PROCEDURE — 96375 TX/PRO/DX INJ NEW DRUG ADDON: CPT

## 2021-03-12 PROCEDURE — 93010 ELECTROCARDIOGRAM REPORT: CPT | Performed by: INTERNAL MEDICINE

## 2021-03-12 PROCEDURE — 80048 BASIC METABOLIC PNL TOTAL CA: CPT

## 2021-03-12 PROCEDURE — 83880 ASSAY OF NATRIURETIC PEPTIDE: CPT

## 2021-03-12 PROCEDURE — 85025 COMPLETE CBC W/AUTO DIFF WBC: CPT

## 2021-03-12 PROCEDURE — 84484 ASSAY OF TROPONIN QUANT: CPT

## 2021-03-12 PROCEDURE — 2500000003 HC RX 250 WO HCPCS: Performed by: STUDENT IN AN ORGANIZED HEALTH CARE EDUCATION/TRAINING PROGRAM

## 2021-03-12 PROCEDURE — 6360000002 HC RX W HCPCS: Performed by: STUDENT IN AN ORGANIZED HEALTH CARE EDUCATION/TRAINING PROGRAM

## 2021-03-12 PROCEDURE — 36415 COLL VENOUS BLD VENIPUNCTURE: CPT

## 2021-03-12 PROCEDURE — 2580000003 HC RX 258: Performed by: STUDENT IN AN ORGANIZED HEALTH CARE EDUCATION/TRAINING PROGRAM

## 2021-03-12 PROCEDURE — 99284 EMERGENCY DEPT VISIT MOD MDM: CPT

## 2021-03-12 PROCEDURE — 96374 THER/PROPH/DIAG INJ IV PUSH: CPT

## 2021-03-12 PROCEDURE — G0378 HOSPITAL OBSERVATION PER HR: HCPCS

## 2021-03-12 PROCEDURE — 71045 X-RAY EXAM CHEST 1 VIEW: CPT

## 2021-03-12 RX ORDER — LABETALOL HYDROCHLORIDE 5 MG/ML
10 INJECTION, SOLUTION INTRAVENOUS ONCE
Status: COMPLETED | OUTPATIENT
Start: 2021-03-12 | End: 2021-03-12

## 2021-03-12 RX ORDER — HYDRALAZINE HYDROCHLORIDE 20 MG/ML
5 INJECTION INTRAMUSCULAR; INTRAVENOUS ONCE
Status: COMPLETED | OUTPATIENT
Start: 2021-03-12 | End: 2021-03-12

## 2021-03-12 RX ORDER — 0.9 % SODIUM CHLORIDE 0.9 %
1000 INTRAVENOUS SOLUTION INTRAVENOUS ONCE
Status: COMPLETED | OUTPATIENT
Start: 2021-03-12 | End: 2021-03-13

## 2021-03-12 RX ADMIN — LABETALOL HYDROCHLORIDE 10 MG: 5 INJECTION INTRAVENOUS at 20:36

## 2021-03-12 RX ADMIN — SODIUM CHLORIDE 1000 ML: 9 INJECTION, SOLUTION INTRAVENOUS at 19:48

## 2021-03-12 RX ADMIN — HYDRALAZINE HYDROCHLORIDE 5 MG: 20 INJECTION, SOLUTION INTRAMUSCULAR; INTRAVENOUS at 23:23

## 2021-03-12 ASSESSMENT — PAIN SCALES - GENERAL: PAINLEVEL_OUTOF10: 6

## 2021-03-12 ASSESSMENT — PAIN DESCRIPTION - PAIN TYPE: TYPE: CHRONIC PAIN

## 2021-03-12 NOTE — TELEPHONE ENCOUNTER
QUINTIN pt went [facility], was told to get electrolytes checked and blood work done but they would not doit. Advised the patient to go to [facility] ER to to get checked.

## 2021-03-12 NOTE — TELEPHONE ENCOUNTER
Pt left a message stating she had a bloody nose with clots when she blew it , also she had blood on her pillow and all over her Cpap machine , please advise

## 2021-03-12 NOTE — TELEPHONE ENCOUNTER
Left message advising patient to visit walk in care over the weekend, as we have no doctor in the office this afternoon

## 2021-03-13 VITALS
OXYGEN SATURATION: 93 % | SYSTOLIC BLOOD PRESSURE: 149 MMHG | TEMPERATURE: 97.8 F | WEIGHT: 217.6 LBS | DIASTOLIC BLOOD PRESSURE: 72 MMHG | HEART RATE: 111 BPM | RESPIRATION RATE: 18 BRPM | HEIGHT: 63 IN | BODY MASS INDEX: 38.55 KG/M2

## 2021-03-13 LAB
ADENOVIRUS BY PCR: NOT DETECTED
BORDETELLA PARAPERTUSSIS BY PCR: NOT DETECTED
BORDETELLA PERTUSSIS BY PCR: NOT DETECTED
CHLAMYDOPHILIA PNEUMONIAE BY PCR: NOT DETECTED
CORONAVIRUS 229E BY PCR: NOT DETECTED
CORONAVIRUS HKU1 BY PCR: NOT DETECTED
CORONAVIRUS NL63 BY PCR: NOT DETECTED
CORONAVIRUS OC43 BY PCR: NOT DETECTED
HBA1C MFR BLD: 6.6 % (ref 4–5.6)
HUMAN METAPNEUMOVIRUS BY PCR: NOT DETECTED
HUMAN RHINOVIRUS/ENTEROVIRUS BY PCR: NOT DETECTED
INFLUENZA A BY PCR: NOT DETECTED
INFLUENZA B BY PCR: NOT DETECTED
METER GLUCOSE: 110 MG/DL (ref 74–99)
METER GLUCOSE: 71 MG/DL (ref 74–99)
METER GLUCOSE: 94 MG/DL (ref 74–99)
MYCOPLASMA PNEUMONIAE BY PCR: NOT DETECTED
PARAINFLUENZA VIRUS 1 BY PCR: NOT DETECTED
PARAINFLUENZA VIRUS 2 BY PCR: NOT DETECTED
PARAINFLUENZA VIRUS 3 BY PCR: NOT DETECTED
PARAINFLUENZA VIRUS 4 BY PCR: NOT DETECTED
RESPIRATORY SYNCYTIAL VIRUS BY PCR: NOT DETECTED
SARS-COV-2, PCR: NOT DETECTED

## 2021-03-13 PROCEDURE — APPSS60 APP SPLIT SHARED TIME 46-60 MINUTES: Performed by: NURSE PRACTITIONER

## 2021-03-13 PROCEDURE — 2580000003 HC RX 258: Performed by: INTERNAL MEDICINE

## 2021-03-13 PROCEDURE — 36415 COLL VENOUS BLD VENIPUNCTURE: CPT

## 2021-03-13 PROCEDURE — 94640 AIRWAY INHALATION TREATMENT: CPT

## 2021-03-13 PROCEDURE — G0378 HOSPITAL OBSERVATION PER HR: HCPCS

## 2021-03-13 PROCEDURE — 6370000000 HC RX 637 (ALT 250 FOR IP)

## 2021-03-13 PROCEDURE — 6370000000 HC RX 637 (ALT 250 FOR IP): Performed by: NURSE PRACTITIONER

## 2021-03-13 PROCEDURE — 6370000000 HC RX 637 (ALT 250 FOR IP): Performed by: INTERNAL MEDICINE

## 2021-03-13 PROCEDURE — 6370000000 HC RX 637 (ALT 250 FOR IP): Performed by: FAMILY MEDICINE

## 2021-03-13 PROCEDURE — 82962 GLUCOSE BLOOD TEST: CPT

## 2021-03-13 PROCEDURE — 6360000002 HC RX W HCPCS: Performed by: INTERNAL MEDICINE

## 2021-03-13 PROCEDURE — 94664 DEMO&/EVAL PT USE INHALER: CPT

## 2021-03-13 PROCEDURE — 2700000000 HC OXYGEN THERAPY PER DAY

## 2021-03-13 PROCEDURE — 99215 OFFICE O/P EST HI 40 MIN: CPT | Performed by: INTERNAL MEDICINE

## 2021-03-13 PROCEDURE — 0202U NFCT DS 22 TRGT SARS-COV-2: CPT

## 2021-03-13 PROCEDURE — 83036 HEMOGLOBIN GLYCOSYLATED A1C: CPT

## 2021-03-13 RX ORDER — CETIRIZINE HYDROCHLORIDE 10 MG/1
10 TABLET ORAL DAILY
Status: DISCONTINUED | OUTPATIENT
Start: 2021-03-13 | End: 2021-03-13 | Stop reason: HOSPADM

## 2021-03-13 RX ORDER — DICYCLOMINE HYDROCHLORIDE 10 MG/1
10 CAPSULE ORAL 2 TIMES DAILY PRN
Status: DISCONTINUED | OUTPATIENT
Start: 2021-03-13 | End: 2021-03-13 | Stop reason: HOSPADM

## 2021-03-13 RX ORDER — ONDANSETRON 2 MG/ML
4 INJECTION INTRAMUSCULAR; INTRAVENOUS EVERY 6 HOURS PRN
Status: DISCONTINUED | OUTPATIENT
Start: 2021-03-13 | End: 2021-03-13 | Stop reason: HOSPADM

## 2021-03-13 RX ORDER — LIDOCAINE 50 MG/G
OINTMENT TOPICAL PRN
Status: DISCONTINUED | OUTPATIENT
Start: 2021-03-13 | End: 2021-03-13 | Stop reason: HOSPADM

## 2021-03-13 RX ORDER — TRAZODONE HYDROCHLORIDE 50 MG/1
100 TABLET ORAL NIGHTLY PRN
Status: DISCONTINUED | OUTPATIENT
Start: 2021-03-13 | End: 2021-03-13 | Stop reason: HOSPADM

## 2021-03-13 RX ORDER — IPRATROPIUM BROMIDE AND ALBUTEROL SULFATE 2.5; .5 MG/3ML; MG/3ML
SOLUTION RESPIRATORY (INHALATION)
Status: COMPLETED
Start: 2021-03-13 | End: 2021-03-13

## 2021-03-13 RX ORDER — BACLOFEN 10 MG/1
10 TABLET ORAL 2 TIMES DAILY
Status: DISCONTINUED | OUTPATIENT
Start: 2021-03-13 | End: 2021-03-13 | Stop reason: HOSPADM

## 2021-03-13 RX ORDER — SODIUM CHLORIDE 0.9 % (FLUSH) 0.9 %
10 SYRINGE (ML) INJECTION PRN
Status: DISCONTINUED | OUTPATIENT
Start: 2021-03-13 | End: 2021-03-13 | Stop reason: HOSPADM

## 2021-03-13 RX ORDER — PROMETHAZINE HYDROCHLORIDE 25 MG/1
12.5 TABLET ORAL EVERY 6 HOURS PRN
Status: DISCONTINUED | OUTPATIENT
Start: 2021-03-13 | End: 2021-03-13 | Stop reason: HOSPADM

## 2021-03-13 RX ORDER — FLUTICASONE PROPIONATE 50 MCG
1 SPRAY, SUSPENSION (ML) NASAL DAILY
Status: DISCONTINUED | OUTPATIENT
Start: 2021-03-13 | End: 2021-03-13 | Stop reason: HOSPADM

## 2021-03-13 RX ORDER — MONTELUKAST SODIUM 10 MG/1
10 TABLET ORAL NIGHTLY
Status: DISCONTINUED | OUTPATIENT
Start: 2021-03-13 | End: 2021-03-13 | Stop reason: HOSPADM

## 2021-03-13 RX ORDER — CARVEDILOL 12.5 MG/1
12.5 TABLET ORAL 2 TIMES DAILY WITH MEALS
Qty: 60 TABLET | Refills: 0 | Status: SHIPPED | OUTPATIENT
Start: 2021-03-13 | End: 2021-03-19 | Stop reason: DRUGHIGH

## 2021-03-13 RX ORDER — MELOXICAM 7.5 MG/1
7.5 TABLET ORAL DAILY
Status: DISCONTINUED | OUTPATIENT
Start: 2021-03-13 | End: 2021-03-13 | Stop reason: HOSPADM

## 2021-03-13 RX ORDER — DEXTROSE MONOHYDRATE 50 MG/ML
100 INJECTION, SOLUTION INTRAVENOUS PRN
Status: DISCONTINUED | OUTPATIENT
Start: 2021-03-13 | End: 2021-03-13 | Stop reason: HOSPADM

## 2021-03-13 RX ORDER — KETOTIFEN FUMARATE 0.35 MG/ML
1 SOLUTION/ DROPS OPHTHALMIC 2 TIMES DAILY
Status: DISCONTINUED | OUTPATIENT
Start: 2021-03-13 | End: 2021-03-13 | Stop reason: HOSPADM

## 2021-03-13 RX ORDER — TROSPIUM CHLORIDE 20 MG/1
20 TABLET, FILM COATED ORAL
Refills: 1 | Status: DISCONTINUED | OUTPATIENT
Start: 2021-03-13 | End: 2021-03-13 | Stop reason: HOSPADM

## 2021-03-13 RX ORDER — ZINC SULFATE 50(220)MG
50 CAPSULE ORAL DAILY
Status: DISCONTINUED | OUTPATIENT
Start: 2021-03-13 | End: 2021-03-13 | Stop reason: HOSPADM

## 2021-03-13 RX ORDER — ALBUTEROL SULFATE 2.5 MG/3ML
2.5 SOLUTION RESPIRATORY (INHALATION) EVERY 6 HOURS PRN
Status: DISCONTINUED | OUTPATIENT
Start: 2021-03-13 | End: 2021-03-13 | Stop reason: HOSPADM

## 2021-03-13 RX ORDER — ASPIRIN 81 MG/1
81 TABLET, CHEWABLE ORAL DAILY
Status: DISCONTINUED | OUTPATIENT
Start: 2021-03-13 | End: 2021-03-13 | Stop reason: HOSPADM

## 2021-03-13 RX ORDER — LOPERAMIDE HYDROCHLORIDE 2 MG/1
2 CAPSULE ORAL 4 TIMES DAILY PRN
Status: DISCONTINUED | OUTPATIENT
Start: 2021-03-13 | End: 2021-03-13 | Stop reason: HOSPADM

## 2021-03-13 RX ORDER — HYDROCODONE BITARTRATE AND ACETAMINOPHEN 7.5; 325 MG/1; MG/1
1 TABLET ORAL EVERY 6 HOURS PRN
Status: DISCONTINUED | OUTPATIENT
Start: 2021-03-13 | End: 2021-03-13 | Stop reason: HOSPADM

## 2021-03-13 RX ORDER — GABAPENTIN 300 MG/1
600 CAPSULE ORAL 3 TIMES DAILY
Status: DISCONTINUED | OUTPATIENT
Start: 2021-03-13 | End: 2021-03-13 | Stop reason: HOSPADM

## 2021-03-13 RX ORDER — ONDANSETRON 4 MG/1
4 TABLET, FILM COATED ORAL EVERY 8 HOURS PRN
Status: DISCONTINUED | OUTPATIENT
Start: 2021-03-13 | End: 2021-03-13 | Stop reason: HOSPADM

## 2021-03-13 RX ORDER — IPRATROPIUM BROMIDE AND ALBUTEROL SULFATE 2.5; .5 MG/3ML; MG/3ML
1 SOLUTION RESPIRATORY (INHALATION) ONCE
Status: COMPLETED | OUTPATIENT
Start: 2021-03-13 | End: 2021-03-13

## 2021-03-13 RX ORDER — VITAMIN B COMPLEX
1000 TABLET ORAL DAILY
Status: DISCONTINUED | OUTPATIENT
Start: 2021-03-13 | End: 2021-03-13 | Stop reason: HOSPADM

## 2021-03-13 RX ORDER — KETOROLAC TROMETHAMINE 5 MG/ML
1 SOLUTION OPHTHALMIC 4 TIMES DAILY
Status: DISCONTINUED | OUTPATIENT
Start: 2021-03-13 | End: 2021-03-13 | Stop reason: HOSPADM

## 2021-03-13 RX ORDER — AZELASTINE 1 MG/ML
1 SPRAY, METERED NASAL 2 TIMES DAILY
Status: DISCONTINUED | OUTPATIENT
Start: 2021-03-13 | End: 2021-03-13 | Stop reason: CLARIF

## 2021-03-13 RX ORDER — SODIUM CHLORIDE 0.9 % (FLUSH) 0.9 %
10 SYRINGE (ML) INJECTION EVERY 12 HOURS SCHEDULED
Status: DISCONTINUED | OUTPATIENT
Start: 2021-03-13 | End: 2021-03-13 | Stop reason: HOSPADM

## 2021-03-13 RX ORDER — METOPROLOL SUCCINATE 25 MG/1
25 TABLET, EXTENDED RELEASE ORAL 2 TIMES DAILY
Status: DISCONTINUED | OUTPATIENT
Start: 2021-03-13 | End: 2021-03-13

## 2021-03-13 RX ORDER — METOCLOPRAMIDE 5 MG/1
5 TABLET ORAL
Status: DISCONTINUED | OUTPATIENT
Start: 2021-03-13 | End: 2021-03-13 | Stop reason: HOSPADM

## 2021-03-13 RX ORDER — CARVEDILOL 6.25 MG/1
12.5 TABLET ORAL 2 TIMES DAILY WITH MEALS
Status: DISCONTINUED | OUTPATIENT
Start: 2021-03-13 | End: 2021-03-13 | Stop reason: HOSPADM

## 2021-03-13 RX ORDER — POLYETHYLENE GLYCOL 3350 17 G/17G
17 POWDER, FOR SOLUTION ORAL DAILY PRN
Status: DISCONTINUED | OUTPATIENT
Start: 2021-03-13 | End: 2021-03-13 | Stop reason: HOSPADM

## 2021-03-13 RX ORDER — PANTOPRAZOLE SODIUM 40 MG/1
40 TABLET, DELAYED RELEASE ORAL
Status: DISCONTINUED | OUTPATIENT
Start: 2021-03-13 | End: 2021-03-13 | Stop reason: HOSPADM

## 2021-03-13 RX ORDER — ACETAMINOPHEN 650 MG/1
650 SUPPOSITORY RECTAL EVERY 6 HOURS PRN
Status: DISCONTINUED | OUTPATIENT
Start: 2021-03-13 | End: 2021-03-13 | Stop reason: HOSPADM

## 2021-03-13 RX ORDER — INSULIN GLARGINE 100 [IU]/ML
0.25 INJECTION, SOLUTION SUBCUTANEOUS NIGHTLY
Status: DISCONTINUED | OUTPATIENT
Start: 2021-03-13 | End: 2021-03-13 | Stop reason: HOSPADM

## 2021-03-13 RX ORDER — CALCIUM CARBONATE 500(1250)
500 TABLET ORAL DAILY
Status: DISCONTINUED | OUTPATIENT
Start: 2021-03-13 | End: 2021-03-13 | Stop reason: HOSPADM

## 2021-03-13 RX ORDER — HYDRALAZINE HYDROCHLORIDE 50 MG/1
50 TABLET, FILM COATED ORAL EVERY 8 HOURS SCHEDULED
Status: DISCONTINUED | OUTPATIENT
Start: 2021-03-13 | End: 2021-03-13 | Stop reason: HOSPADM

## 2021-03-13 RX ORDER — ISOSORBIDE MONONITRATE 60 MG/1
60 TABLET, EXTENDED RELEASE ORAL DAILY
Status: DISCONTINUED | OUTPATIENT
Start: 2021-03-13 | End: 2021-03-13 | Stop reason: HOSPADM

## 2021-03-13 RX ORDER — NICOTINE POLACRILEX 4 MG
15 LOZENGE BUCCAL PRN
Status: DISCONTINUED | OUTPATIENT
Start: 2021-03-13 | End: 2021-03-13 | Stop reason: HOSPADM

## 2021-03-13 RX ORDER — BUMETANIDE 1 MG/1
1 TABLET ORAL DAILY
Status: DISCONTINUED | OUTPATIENT
Start: 2021-03-13 | End: 2021-03-13 | Stop reason: HOSPADM

## 2021-03-13 RX ORDER — ATORVASTATIN CALCIUM 10 MG/1
10 TABLET, FILM COATED ORAL DAILY
Refills: 3 | Status: DISCONTINUED | OUTPATIENT
Start: 2021-03-13 | End: 2021-03-13 | Stop reason: HOSPADM

## 2021-03-13 RX ORDER — ACETAMINOPHEN 325 MG/1
650 TABLET ORAL EVERY 6 HOURS PRN
Status: DISCONTINUED | OUTPATIENT
Start: 2021-03-13 | End: 2021-03-13 | Stop reason: HOSPADM

## 2021-03-13 RX ORDER — BENZONATATE 100 MG/1
100 CAPSULE ORAL 3 TIMES DAILY PRN
Status: DISCONTINUED | OUTPATIENT
Start: 2021-03-13 | End: 2021-03-13 | Stop reason: HOSPADM

## 2021-03-13 RX ORDER — DULOXETIN HYDROCHLORIDE 60 MG/1
60 CAPSULE, DELAYED RELEASE ORAL 2 TIMES DAILY
Status: DISCONTINUED | OUTPATIENT
Start: 2021-03-13 | End: 2021-03-13 | Stop reason: HOSPADM

## 2021-03-13 RX ORDER — M-VIT,TX,IRON,MINS/CALC/FOLIC 27MG-0.4MG
1 TABLET ORAL DAILY
Status: DISCONTINUED | OUTPATIENT
Start: 2021-03-13 | End: 2021-03-13 | Stop reason: HOSPADM

## 2021-03-13 RX ORDER — DEXTROSE MONOHYDRATE 25 G/50ML
12.5 INJECTION, SOLUTION INTRAVENOUS PRN
Status: DISCONTINUED | OUTPATIENT
Start: 2021-03-13 | End: 2021-03-13 | Stop reason: HOSPADM

## 2021-03-13 RX ADMIN — TROSPIUM CHLORIDE 20 MG: 20 TABLET, FILM COATED ORAL at 06:47

## 2021-03-13 RX ADMIN — METOCLOPRAMIDE 5 MG: 5 TABLET ORAL at 17:29

## 2021-03-13 RX ADMIN — ISOSORBIDE MONONITRATE 60 MG: 60 TABLET ORAL at 10:12

## 2021-03-13 RX ADMIN — BENZONATATE 100 MG: 100 CAPSULE ORAL at 19:07

## 2021-03-13 RX ADMIN — BACLOFEN 10 MG: 10 TABLET ORAL at 10:12

## 2021-03-13 RX ADMIN — ATORVASTATIN CALCIUM 10 MG: 10 TABLET, FILM COATED ORAL at 10:13

## 2021-03-13 RX ADMIN — TROSPIUM CHLORIDE 20 MG: 20 TABLET, FILM COATED ORAL at 17:29

## 2021-03-13 RX ADMIN — PANTOPRAZOLE SODIUM 40 MG: 40 TABLET, DELAYED RELEASE ORAL at 17:29

## 2021-03-13 RX ADMIN — HYDRALAZINE HYDROCHLORIDE 50 MG: 50 TABLET, FILM COATED ORAL at 06:47

## 2021-03-13 RX ADMIN — HYDRALAZINE HYDROCHLORIDE 50 MG: 50 TABLET, FILM COATED ORAL at 13:31

## 2021-03-13 RX ADMIN — Medication 1000 UNITS: at 10:13

## 2021-03-13 RX ADMIN — Medication 1 TABLET: at 10:12

## 2021-03-13 RX ADMIN — IPRATROPIUM BROMIDE AND ALBUTEROL SULFATE 3 ML: .5; 3 SOLUTION RESPIRATORY (INHALATION) at 00:44

## 2021-03-13 RX ADMIN — IPRATROPIUM BROMIDE 0.5 MG: 0.5 SOLUTION RESPIRATORY (INHALATION) at 16:35

## 2021-03-13 RX ADMIN — KETOROLAC TROMETHAMINE 1 DROP: 5 SOLUTION/ DROPS OPHTHALMIC at 10:13

## 2021-03-13 RX ADMIN — SODIUM CHLORIDE, PRESERVATIVE FREE 10 ML: 5 INJECTION INTRAVENOUS at 10:14

## 2021-03-13 RX ADMIN — GABAPENTIN 600 MG: 300 CAPSULE ORAL at 13:32

## 2021-03-13 RX ADMIN — ASPIRIN 81 MG: 81 TABLET, CHEWABLE ORAL at 10:13

## 2021-03-13 RX ADMIN — ACETAMINOPHEN 650 MG: 325 TABLET ORAL at 13:37

## 2021-03-13 RX ADMIN — HYDROCODONE BITARTRATE AND ACETAMINOPHEN 1 TABLET: 7.5; 325 TABLET ORAL at 06:48

## 2021-03-13 RX ADMIN — GABAPENTIN 600 MG: 300 CAPSULE ORAL at 10:13

## 2021-03-13 RX ADMIN — METOCLOPRAMIDE 5 MG: 5 TABLET ORAL at 06:47

## 2021-03-13 RX ADMIN — DULOXETINE HYDROCHLORIDE 60 MG: 60 CAPSULE, DELAYED RELEASE ORAL at 10:12

## 2021-03-13 RX ADMIN — ZINC SULFATE 220 MG (50 MG) CAPSULE 50 MG: CAPSULE at 10:14

## 2021-03-13 RX ADMIN — CETIRIZINE HYDROCHLORIDE 10 MG: 10 TABLET, FILM COATED ORAL at 10:12

## 2021-03-13 RX ADMIN — CALCIUM 500 MG: 500 TABLET ORAL at 10:12

## 2021-03-13 RX ADMIN — KETOTIFEN FUMARATE 1 DROP: 0.35 SOLUTION/ DROPS OPHTHALMIC at 10:13

## 2021-03-13 RX ADMIN — MAGNESIUM GLUCONATE 500 MG ORAL TABLET 400 MG: 500 TABLET ORAL at 10:13

## 2021-03-13 RX ADMIN — IPRATROPIUM BROMIDE 0.5 MG: 0.5 SOLUTION RESPIRATORY (INHALATION) at 08:44

## 2021-03-13 RX ADMIN — KETOROLAC TROMETHAMINE 1 DROP: 5 SOLUTION/ DROPS OPHTHALMIC at 13:32

## 2021-03-13 RX ADMIN — FLUTICASONE PROPIONATE 1 SPRAY: 50 SPRAY, METERED NASAL at 10:13

## 2021-03-13 RX ADMIN — CARVEDILOL 12.5 MG: 6.25 TABLET, FILM COATED ORAL at 17:29

## 2021-03-13 RX ADMIN — MELOXICAM 7.5 MG: 7.5 TABLET ORAL at 10:13

## 2021-03-13 RX ADMIN — SACUBITRIL AND VALSARTAN 1 TABLET: 24; 26 TABLET, FILM COATED ORAL at 10:12

## 2021-03-13 RX ADMIN — METOPROLOL SUCCINATE 25 MG: 25 TABLET, EXTENDED RELEASE ORAL at 10:13

## 2021-03-13 RX ADMIN — BUMETANIDE 1 MG: 1 TABLET ORAL at 10:12

## 2021-03-13 RX ADMIN — PANTOPRAZOLE SODIUM 40 MG: 40 TABLET, DELAYED RELEASE ORAL at 06:48

## 2021-03-13 RX ADMIN — IPRATROPIUM BROMIDE 0.5 MG: 0.5 SOLUTION RESPIRATORY (INHALATION) at 12:41

## 2021-03-13 RX ADMIN — IPRATROPIUM BROMIDE AND ALBUTEROL SULFATE 3 ML: 2.5; .5 SOLUTION RESPIRATORY (INHALATION) at 00:44

## 2021-03-13 ASSESSMENT — PAIN DESCRIPTION - ONSET: ONSET: ON-GOING

## 2021-03-13 ASSESSMENT — PAIN SCALES - GENERAL
PAINLEVEL_OUTOF10: 10
PAINLEVEL_OUTOF10: 6

## 2021-03-13 ASSESSMENT — PAIN DESCRIPTION - PAIN TYPE: TYPE: CHRONIC PAIN

## 2021-03-13 ASSESSMENT — PAIN DESCRIPTION - DESCRIPTORS: DESCRIPTORS: ACHING

## 2021-03-13 ASSESSMENT — PAIN DESCRIPTION - PROGRESSION: CLINICAL_PROGRESSION: NOT CHANGED

## 2021-03-13 NOTE — H&P
Hospitalist History & Physical      PCP: Judith Arreola DO    Date of Admission: 3/12/2021    Date of Service: Pt seen/examined on 3/12/2021 and is admitted to Inpatient with expected LOS greater than two midnights due to medical therapy. .    Chief Complaint:  had concerns including Hypertension (had nose bleed this morning, went to [facility], and was sent here for high BP) and Cough (2 weeks ongoing, does have allergies). Patient presented to [facility] with chief complaint of epistaxis  History Of Present Illness:    Ms. Polly Hickman, a 76y.o. year old female  who  has a past medical history of Asthma, Atherosclerosis of native artery of left leg with rest pain (HCC), C. difficile diarrhea, CAD (coronary artery disease), Cellulitis and abscess of trunk, Cerebellar infarct ([address] Chronic back pain, Chronic kidney disease, Chronic systolic congestive heart failure ([address] Chronic venous insufficiency, COPD (chronic obstructive pulmonary disease) ([address] Depression, Diabetes mellitus ([address] Diabetic neuropathy ([address] Diverticulosis, Fatty liver, Glaucoma, open angle, Hepatic encephalopathy ([address] Hiatal hernia, History of blood transfusion, Hyperlipidemia, Hyperplastic colon polyp, Hypertension, Incontinence, Liver mass, Morbid obesity ([address] Movement disorder, Myocardial infarction ([address] Osteoarthritis, generalized, Pain in both lower legs, Peripheral vascular disease ([address] Pneumonia, Pulmonary edema, PVD (peripheral vascular disease) with claudication ([address] Sleep apnea, Status post peripheral artery angioplasty, Tobacco abuse, Tobacco abuse, Tubular adenoma polyp of rectum, Urinary tract infection due to ESBL Klebsiella, and Ventral hernia.        Patient has history of hypertension essential  States that she is compliant with her medications  Today her blood pressure readings were elevated  Patient also experienced headaches and intermittent nosebleeds  Patient was evaluated at a local outpatient clinic due to the epistaxis  Evaluation it was noted that her blood pressures were elevated and patient was advised to seek treatment and evaluation at the local emergency department  Patient was evaluated [facility] emergency department  She was treated for elevated blood pressure readings  The provider at the facility reports that patient was hypertensive and tachycardic with systolic blood pressure over 200     The emergency department physician at [facility], did not does note that he felt there were some changes in her EKG but he he did not specify what changes he noted               Past Medical History:        Diagnosis Date    Asthma     Atherosclerosis of native artery of left leg with rest pain ([address] 11/9/2018    C. difficile diarrhea 2015    CAD (coronary artery disease) 2011    Cellulitis and abscess of trunk 4/14/2015    Cerebellar infarct ([address] 4/3/2019    Remote, rt. cerebellum, head CT scan, 1/9/19    Chronic back pain     Chronic kidney disease     Chronic systolic congestive heart failure ([address] 10/4/2013    Chronic venous insufficiency 10/11/2017    COPD (chronic obstructive pulmonary disease) ([address]     Depression     Diabetes mellitus ([address]     Diabetic neuropathy ([address]     Diverticulosis     Fatty liver 1/8/2016    per US    Glaucoma, open angle 2/1/2016    Mild-OU    Hepatic encephalopathy ([address] 02/07/2016    resolved    Hiatal hernia     History of blood transfusion     Hyperlipidemia     Hyperplastic colon polyp     Hypertension     Incontinence     Liver mass     Morbid obesity ([address]     Movement disorder     Myocardial infarction ([address] 2011    Osteoarthritis, generalized     Pain in both lower legs 10/11/2017    Peripheral vascular disease ([address]     Pneumonia 1/26/2014    Pulmonary edema     resolved    PVD (peripheral vascular disease) with claudication ([address] 8/30/2017    Sleep apnea     uses BI PAP    Status post peripheral artery angioplasty SKIN TWICE A DAY 1/12/21  Yes Carl Britt, DO   aspirin (ASPIRIN LOW DOSE) 81 MG chewable tablet TAKE 1 TABLET BY MOUTH EVERY DAY 1/5/21  Yes Mendez Hayes DO   Alcohol Swabs ([employer] ALCOHOL PREP SWABS) 70 % PADS Use daily 12/29/20  Yes Carl Britt, DO   blood glucose test strips (ACCU-CHEK GUIDE) strip 1 each by In Vitro route 3 times daily 12/29/20  Yes Mendez Hayes DO   Accu-Chek FastClix Lancets MISC Use 4x daily 12/28/20  Yes Mendez Hayes DO   [employer] GAS RELIEF ULTRA STRENGTH 180 MG CAPS TAKE 1 CAPSULE BY MOUTH TWICE A DAY 10/8/20  Yes Historical Provider, MD   metoclopramide (REGLAN) 5 MG tablet TAKE 1 TABLET BY MOUTH BEFORE LUNCH, 1 TABLET BEFORE DINNER AND 2 TAB S AT BEDTIME 10/12/20  Yes Historical Provider, MD   ROBAFEN 100 MG/5ML syrup TAKE 20 MLS BY MOUTH 3 TIMES DAILY AS NEEDED FOR COUGH 10/16/20  Yes Historical Provider, MD   ciclopirox (LOPROX) 0.77 % cream APPLY TOPICALLY 2 TIMES A DAY 10/16/20  Yes Historical Provider, MD   Boswellia-Glucosamine-Vit D (OSTEO BI-FLEX ONE PER DAY PO) Take by mouth   Yes Historical Provider, MD   calcium carbonate (OSCAL) 500 MG TABS tablet Take 500 mg by mouth daily   Yes Historical Provider, MD   magnesium oxide (MAG-OX) 400 MG tablet Take 400 mg by mouth daily   Yes Historical Provider, MD   zinc 50 MG TABS tablet Take 50 mg by mouth daily   Yes Historical Provider, MD   vitamin D3 (CHOLECALCIFEROL) 25 MCG (1000 UT) TABS tablet TAKE 1 TABLET BY MOUTH EVERY DAY 11/24/20  Yes Karen Hayes,    SPIRIVA RESPIMAT 1.25 MCG/ACT AERS inhaler INHALE TWO (2) PUFFS BY MOUTH EVERY DAY 11/24/20  Yes Mendez Hayes DO   metoprolol succinate (TOPROL XL) 25 MG extended release tablet Take 1 tablet by mouth 2 times daily 11/24/20  Yes Fabrizio Goldberg MD   isosorbide mononitrate (IMDUR) 60 MG extended release tablet TAKE (1) TABLET BY MOUTH DAILY 11/18/20  Yes Fabrizio Goldberg MD   MYRBETRIQ 50 MG TB24 TAKE 1 TABLET BY MOUTH EVERY DAY 11/17/20  Yes Nataliia Hayes,    cetirizine (ZYRTEC) 10 MG tablet TAKE 1 TABLET BY MOUTH EVERY DAY 10/23/20  Yes Nataliia Hayes DO   baclofen (LIORESAL) 10 MG tablet TAKE 1 TABLET BY MOUTH TWICE A DAY AS NEEDED 10/13/20  Yes Nataliia Hayes DO   B-D UF III MINI PEN NEEDLES 31G X 5 MM MISC USE AS DIRECTED 10/13/20  Yes Karne Hayes DO   bumetanide (BUMEX) 1 MG tablet TAKE (1) TABLET BY MOUTH DAILY 10/13/20  Yes Nataliia Hayes DO   traZODone (DESYREL) 100 MG tablet TAKE (1) TABLET BY MOUTH NIGHTLY 10/13/20  Yes Karen Hayes DO   DULoxetine (CYMBALTA) 60 MG extended release capsule TAKE ONE (1) CAPSULE BY MOUTH TWICE DAILY 10/7/20  Yes Karen Hayes DO   montelukast (SINGULAIR) 10 MG tablet TAKE 1 TABLET BY MOUTH NIGHTLY 10/7/20  Yes Karen Hayes DO   simvastatin (ZOCOR) 20 MG tablet TAKE 1 TABLET BY MOUTH NIGHTLY 10/6/20  Yes Morris Burton MD   azelastine (ASTELIN) 0.1 % nasal spray 1 spray by Nasal route 2 times daily Use in each nostril as directed 9/16/20  Yes Bg Diaz,    ENTRESTO 24-26 MG per tablet TAKE ONE (1) TABLET BY MOUTH TWICE DAILY 9/9/20  Yes Morris Burton MD   oxybutynin (DITROPAN-XL) 10 MG extended release tablet  8/25/20  Yes Historical Provider, MD   ondansetron (ZOFRAN) 4 MG tablet TAKE 1 TABLET BY MOUTH EVERY 6 HOURS AS NEEDED FOR NAUSEA 9/1/20  Yes Historical Provider, MD   Handicap Placard MISC by Does not apply route Patient cannot walk 200 ft without stopping to rest.    Expiration 8/2022 7/30/20  Yes Karen Hayes DO   dicyclomine (BENTYL) 10 MG capsule TAKE 1 CAPSULE BY MOUTH TWICE A DAY 6/22/20  Yes Historical Provider, MD   lidocaine (XYLOCAINE) 5 % ointment APPLY TOPICALLY AS NEEDED FOR PAIN 7/17/20  Yes Karen Hayes, DO   pantoprazole (PROTONIX) 40 MG tablet TAKE 1 TABLET BY MOUTH TWICE A DAY 7/17/20  Yes Karen Hayes, DO   Blood Glucose Monitoring Suppl (ACCU-CHEK GUIDE ME) w/Device KIT Use as directed 4/10/20  Yes Nataliia Rosa Freddy, DO   SOFT TOUCH LANCETS MISC Use 3 x daily 4/10/20  Yes Corazon Hayes,    econazole nitrate 1 % cream Apply topically daily Apply topically daily. Yes Historical Provider, MD   ketorolac (ACULAR) 0.5 % ophthalmic solution 1 drop 4 times daily   Yes Historical Provider, MD   linaCLOtide (LINZESS) 72 MCG CAPS capsule Take 72 mcg by mouth every morning (before breakfast)   Yes Historical Provider, MD   loperamide (IMODIUM) 2 MG capsule Take 2 mg by mouth 4 times daily as needed for Diarrhea   Yes Historical Provider, MD   HYDROcodone-acetaminophen (NORCO) 7.5-325 MG per tablet Take 1 tablet by mouth every 6 hours as needed for Pain. Yes Historical Provider, MD   Misc. Devices [address] [facility] Power wheelchair 11/12/19  Yes Cimarron Sites, DO   Lift Chair [address] [facility] by Does not apply route Use daily 10/10/19  Yes Corazon Hayes,    Nutritional Supplements (GLUCOSE MANAGEMENT) TABS Use daily prn if low glucose <60 6/26/19  Yes Corazon Hayes,    hydrocortisone 2.5 % cream APPLY TO AFFECTED AREA TWICE DAILY 6/26/19  Yes Corazon Hayes,    gabapentin (NEURONTIN) 300 MG capsule Take 600 mg by mouth 3 times daily. .   Yes Historical Provider, MD   olopatadine (PATADAY) 0.2 % SOLN ophthalmic solution Place 1 drop into both eyes daily   Yes Historical Provider, MD   Multiple Vitamins-Minerals (THERAPEUTIC MULTIVITAMIN-MINERALS) tablet Take 1 tablet by mouth daily   Yes Historical Provider, MD   albuterol (PROVENTIL) (2.5 MG/3ML) 0.083% nebulizer solution Take 2.5 mg by nebulization every 6 hours as needed for Wheezing   Yes Historical Provider, MD   BiPAP Machine MISC by Does not apply route nightly 27/23   Yes Mansoor Emanuel MD       Allergies:  Latex, Bee venom, Dilaudid [hydromorphone hcl], Dye [iodides], Percocet [oxycodone-acetaminophen], Keflex [cephalexin], Lasix [furosemide], Levaquin [levofloxacin in d5w], Lipitor, Lyrica [pregabalin], Morphine, Naproxen, Nefazodone, Norvasc [amlodipine], Oxycodone-acetaminophen, Shellfish-derived products, and Trazodone and nefazodone    Social History:    TOBACCO:   reports that she has been smoking cigarettes. She started smoking about 46 years ago. She has a 10.00 pack-year smoking history. She has never used smokeless tobacco.  ETOH:   reports current alcohol use. Family History:    Reviewed in detail and negative for DM, CAD, Cancer, CVA. Positive as follows\"      Problem Relation Age of Onset    Cancer Mother     Asthma Father        REVIEW OF SYSTEMS:   Pertinent positives as noted in the HPI. All other systems reviewed and negative. PHYSICAL EXAM:  BP (!) 148/77   Pulse 95   Temp 97.4 °F (36.3 °C) (Temporal)   Resp 20   Ht 5' 2.5\" (1.588 m)   Wt 217 lb 9.6 oz (98.7 kg)   LMP 01/01/1990   SpO2 98%   BMI 39.17 kg/m²   General appearance: Morbidly obese  HEENT: No lesions externally on the ears sinuses are not tender to percussion or palpation                        There is no thrush or angular stomatitis  Neck: . Trachea midline. Respiratory: Clear sounds to the upper lobes unlabored respiratory pattern at rest  Cardiovascular: s1 s2 audible pulse is regular  Abdomen:  bowel sounds present without guarding rigidity on palpation  Musculoskeletal: No clubbing, no tenosynovitis, adequate capillary refill. Skin: Warm to palpation  Neurologic: Patient is awake and alert  speech is clear.   There is no aphasia or dysarthria  Psychiatric: Mood and affect match patient is alert and attentive    Reviewed EKG from March 12/21 1851                Reveals right bundle branch block with normal axis                    Tachycardia rate of 105  His EKG from 9/17/2020 1835               Revealed right bundle branch block                   It was read out as anteroseptal infarct age undetermined    CBC:   Recent Labs     03/12/21 1952   WBC 8.1   RBC 4.68   HGB 15.1   HCT 47.4   .3*   RDW 12.4        BMP:   Recent Labs     03/12/21 1952   NA 140   K 4.1      CO2 30*   BUN 14   CREATININE 0.9     CE:  Recent Labs     03/12/21 1952   TROPONINI <0.01               Imaging:  Xr Chest Portable    Result Date: 3/12/2021  EXAMINATION: ONE XRAY VIEW OF THE CHEST 3/12/2021 6:47 pm COMPARISON: October 29, 2019 HISTORY: ORDERING SYSTEM PROVIDED HISTORY: Shortness of breath TECHNOLOGIST PROVIDED HISTORY: Reason for exam:->Shortness of breath FINDINGS: Moderate cardiomegaly. Interstitial and hazy opacities are present bilaterally. No obvious pleural effusion. No pneumothorax. Cardiomegaly with pulmonary vascular congestion or developing interstitial pulmonary edema. ASSESSMENT:    Principal Problem:    Uncontrolled hypertension  Active Problems:    Chronic combined systolic and diastolic heart failure (HCC)    Coronary artery disease involving native coronary artery of native heart without angina pectoris    Chronic respiratory failure with hypoxia and hypercapnia ([facility])    DAYAN and COPD overlap syndrome (HCC)    Essential hypertension    DM2 (diabetes mellitus, type 2) ([facility])    Hyperlipidemia    Mixed incontinence urge and stress    Chronic back pain - d/t muscle spasm    Glaucoma, open angle    Hiatal hernia  Resolved Problems:    * No resolved hospital problems.  *    Patient presented to [facility] emergency department with elevated blood pressures  Diagnosed with uncontrolled hypertension     Patient received treatment with IV Apresoline and labetalol bolus doses      Patient's blood pressure appears to be better controlled at this time    Epistaxis which now has resolved  Chest x-ray abnormalities       Clinically patient does not appear to be in volume overload CHF         It is possible that chest x-ray picture may be clouded by patient's body habitus             Patient has a history of coronary artery disease asymptomatic  Patient with history of chronic systolic and diastolic heart failure-appears volume optimized  Patient has a history of obstructive sleep apnea and COPD overlap syndrome-currently symptoms are controlled  Patient has history of type 2 diabetes on insulin-blood sugars appear to be stable  Patient history of hyperlipidemia unspecified-currently asymptomatic  Patient history of hiatal hernia-currently stable  Patient has a history of mixed incontinence urge and stress-  Patient has history of glaucoma     PLAN:    Patient transferred from [facility] to this facility           Further treatment of her hypertension             And evaluation by cardiology      Continue to monitor blood pressure     To resume her blood pressure medications     We will obtain cardiology consult  Continue patient's medication for hyperlipidemia  and her proton pump inhibitors  Patient should continue her ophthalmic drops for treatment of glaucoma  Patient can continue her nasal sprays  Temporary use of long-acting insulin along with correctional scale insulin subcu for glucose stabilizer    Diet: DIET CARB CONTROL;  Code Status: Full Code    Surrogate decision maker confirmed with patient:  Primary Emergency Contact: Zina Ayala, Home Phone: [phone]    DVT Prophylaxis: []Lovenox []Heparin []PCD [] [address] []Encouraged ambulation    Disposition: []Med/Surg [] Intermediate [] ICU/CCU  Admit status: [] Observation [] Inpatient     +++++++++++++++++++++++++++++++++++++++++++++++++  [facility] [place]  +++++++++++++++++++++++++++++++++++++++++++++++++  NOTE: This report was transcribed using voice recognition software. Every effort was made to ensure accuracy; however, inadvertent computerized transcription errors may be present.

## 2021-03-13 NOTE — PROGRESS NOTES
Evaluated patient at bedside. She complains of chronic lower extremity pain which she states got worse when she had to lay on ER cart. She has chronic abdominal pain, always worse on the right than the left. She has an appointment on 3/16 with her GI specialist who manages her chronic constipation, and a colonoscopy scheduled for later this month (last was 3 years ago, mother had colon cancer.)   Cardiology evaluated patient, changed toprol to coreg and continued other medciations. BP has been reasonably controlled since midnight. Cardiology signed off, told patient to follow up in a month and that she was stable from cardiology standpoint, though they also mentioned recommending keeping her an additional night to monitor BP. Patient states symptoms which brought her to the ED had resolved and she initially didn't want admitted, but is now refusing to be d/c home. States she wants to stay overnight because her cardiologist told her to stay. Will place d/c order due to no medical need for patient to remain overnight with stable BP and outpatient followup planned.      [address] [facility]

## 2021-03-13 NOTE — CONSULTS
Inpatient Cardiology Consultation      Reason for Consult:  Uncontrolled HTN    Consulting Physician: Dr. Andres Piña     Requesting Physician:  Dr. Hernando Pal     Date of Consultation: 3/13/2021    HISTORY OF PRESENT ILLNESS:   Ms. Hayde Penaloza is a 58-year-old obese AA female who is known to Dr. Andres Piña; most recently seen in outpatient on 9/17/2020 for a history of HFrEF, CAD status post CABG (2011), morbid obesity, DAYAN, hypertension, hyperlipidemia, diabetes mellitus, tobacco use and marijuana use, PAD status post left popliteal and tibial arthrectomy and angioplasty, chronic right bundle branch block. Patient was continued on GDMT (including Toprol-XL, Entresto, Bumex, hydralazine and Imdur)---> patient remains on aspirin and statin for history of CAD. Prior to patient presenting to the ED on 3/12/2021 she had complaints of feeling her blood pressure had been more elevated over the last several days. Her normal blood pressure is 120/60 and since 3/10/2021 her blood pressure has been averaging in the 282U systolic. She reports chronic SAUNDERS --> bilateral leg pain, stomach pain (abdominal bloating and no bowel movement in 2 weeks). She had one episode of right chest wall \"sharp pain\" occurring only with coughing. Denies lower extremity swelling, orthopnea, and PND. She remains compliant with her home medications. She uses trilogy for DAYAN. She continues to use marijuana 2 times per day, denies alcohol use and continues to smoke 5 to 6 cigarettes/day. Patient reported that she woke up on 3/12/2021 with a nosebleed around 3 AM.  At the time she took her blood pressure and reported that it was 189/80. She went to urgent care and was instructed to go to the ED for further evaluation and management. She did report having a mild headache at that time. Upon arrival to the ED: /124, , Afebrile, RA. Labs: Troponin <0.01 x 1, BUN/Cr 14/0.9, WBC 8.1, H/H stable. Hgb A1c 6.6.    CXR: Cardiomegaly with pulmonary vascular congestion or developing interstitial pulmonary edema. ER medications: Hydralazine 5 mg IV x1, normal saline IV infusion 1 L, labetalol 10 mg IV x1. Home medications including aspirin 81 mg daily, Lipitor 10 mg daily, Bumex 1 mg p.o. daily, hydralazine 50 mg 3 times daily, Imdur 60 mg daily, Entresto 24-26 mg twice daily. EKG: Sinus tachycardia, right bundle branch block, rate 105 bpm.  Patient is currently laying flat in bed and appears to be in no acute distress. She does not complain of abdominal tenderness and bloating with no bowel movement in over 2 weeks. She denies chest pain and shortness of breath currently. Please note: past medical records were reviewed per electronic medical record (EMR) - see detailed reports under Past Medical/ Surgical History. Past Medical History:    1. Morbid obesity  2. Hypertension  3. Hyperlipidemia. Currently not on a statin but has been intolerant of Lipitor  4. Current tobacco abuse: 5-6 cigarettes/day. No plans to quit. 5. Diabetes which is insulin requiring , diabetic neuropathy   6. Obstructive sleep apnea  7. Ischemic cardiomyopathy with a CABG in 2011 with a LIMA to the LAD and a saphenous vein graft to the marginal last cardiac catheterization was in April 2015 and the LIMA was unable to be visualized and the saphenous vein graft to the obtuse marginal was patent. He had no further stress testing since that time. 8. 2-D echocardiogram January 4, 2017, EF 35-45% and diffuse hypokinesis of the left ventricle and left atrium is dilatedAortic valve appears mildly sclerotic and there is mild mitral regurgitation  9. Right bundle branch block  10. C. diff 2015  11. Asthma status post bronchial brush biopsy in January 2013   12.  osteoarthritis. And left knee replacement  13. Liver biopsy  14. Bilateral breast reduction   15. Diverticulosis  16. Ventral hernia  17. Urinary tract infection  18. Glaucoma  19. Anemia and blood transfusion  20.  Hepatic encephalopathy  21. Hyperplastic colon polyps and fatty liver  22. Pneumonia  23. Depression  24. Chronic venous insufficiency  25. Incontinence  26. Gallbladder surgery for gallstones at [facility]  27. Endoscopy and colonoscopy  28. Carpal tunnelokay possible discharge  29. Cholecystectomy  30. NSTEMI: 6/6/2018 (troponin 0 0.35, 0.32, 0.48) no rise in CK or CK-MB. Creatinine 1.0. Per cardiology, probable recent NSTEMI, type II low risk noninvasive 6/5/2018. Patient was diuresed approximately 10 L --> And discharged home on loop diuretic. 31. Lexiscan MPS: 6/5/2018: Pharmacologically induced perfusion defect involving the lateral wall, without evidence of reversibility, diffusely hypokinetic left ventricular wall motion with a focal area of akinesis involving the mid lateral wall, LVEF 42%. 32. TTE: 6/5/2018: LVEF 65% by biplane, stage II diastolic dysfunction, moderate LVH, no wall motion abnormality, normal RV function, no pericardial effusion. 33. Chronic right bundle branch block. 29. Patient follows with neurology at [facility] and was diagnosed with chronic pain syndrome and is recommending chronic pain management. 35. History of severe peripheral vascular disease with history of left popliteal and tibial trunk atherectomy on 11/13/2018 was initiated on aspirin and Plavix. Medications Prior to admit:  Prior to Admission medications    Medication Sig Start Date End Date Taking? Authorizing Provider   hydrALAZINE (APRESOLINE) 50 MG tablet TAKE 1 TABLET BY MOUTH THREE TIMES A DAY 2/23/21  Yes Karen Hayes,    fluticasone (FLONASE) 50 MCG/ACT nasal spray USE 1 SPRAY IN EACH NOSTRIL TWICE A DAY 2/23/21  Yes Karen Hayes DO   meloxicam (MOBIC) 7.5 MG tablet TAKE 1 TABLET BY MOUTH EVERY DAY 2/18/21  Yes Jennifer Guillermo, DO   Misc.  Devices (NOVA CUSHION GEL SEAT PAD) MISC Use daily 2/16/21  Yes Karen Hayes DO   Insulin Degludec (TRESIBA FLEXTOUCH) 200 UNIT/ML SOPN INJECT 50 UNITS INTO THE SKIN TWICE A DAY 1/12/21  Yes Klaus Anguiano,    aspirin (ASPIRIN LOW DOSE) 81 MG chewable tablet TAKE 1 TABLET BY MOUTH EVERY DAY 1/5/21  Yes Michael Hayes DO   Alcohol Swabs ([employer]S ALCOHOL PREP SWABS) 70 % PADS Use daily 12/29/20  Yes Michael Hayes DO   blood glucose test strips (ACCU-CHEK GUIDE) strip 1 each by In Vitro route 3 times daily 12/29/20  Yes Michael Hayes DO   Accu-Chek FastClix Lancets MISC Use 4x daily 12/28/20  Yes Michael Hayes DO   [employer] GAS RELIEF ULTRA STRENGTH 180 MG CAPS TAKE 1 CAPSULE BY MOUTH TWICE A DAY 10/8/20  Yes Historical Provider, MD   metoclopramide (REGLAN) 5 MG tablet TAKE 1 TABLET BY MOUTH BEFORE LUNCH, 1 TABLET BEFORE DINNER AND 2 TAB S AT BEDTIME 10/12/20  Yes Historical Provider, MD   ROBAFEN 100 MG/5ML syrup TAKE 20 MLS BY MOUTH 3 TIMES DAILY AS NEEDED FOR COUGH 10/16/20  Yes Historical Provider, MD   ciclopirox (LOPROX) 0.77 % cream APPLY TOPICALLY 2 TIMES A DAY 10/16/20  Yes Historical Provider, MD [place]-Glucosamine-Vit D (OSTEO BI-FLEX ONE PER DAY PO) Take by mouth   Yes Historical Provider, MD   calcium carbonate (OSCAL) 500 MG TABS tablet Take 500 mg by mouth daily   Yes Historical Provider, MD   magnesium oxide (MAG-OX) 400 MG tablet Take 400 mg by mouth daily   Yes Historical Provider, MD   zinc 50 MG TABS tablet Take 50 mg by mouth daily   Yes Historical Provider, MD   vitamin D3 (CHOLECALCIFEROL) 25 MCG (1000 UT) TABS tablet TAKE 1 TABLET BY MOUTH EVERY DAY 11/24/20  Yes Karen Hayes,    SPIRIVA RESPIMAT 1.25 MCG/ACT AERS inhaler INHALE TWO (2) PUFFS BY MOUTH EVERY DAY 11/24/20  Yes Michael Hayes DO   metoprolol succinate (TOPROL XL) 25 MG extended release tablet Take 1 tablet by mouth 2 times daily 11/24/20  Yes Lida Haider MD   isosorbide mononitrate (IMDUR) 60 MG extended release tablet TAKE (1) TABLET BY MOUTH DAILY 11/18/20  Yes Lida Haider MD   MYRBETRIQ 50 MG TB24 TAKE 1 TABLET BY MOUTH EVERY DAY 11/17/20  Yes Lu Hayes,    cetirizine (ZYRTEC) 10 MG tablet TAKE 1 TABLET BY MOUTH EVERY DAY 10/23/20  Yes Lu Hayes DO   baclofen (LIORESAL) 10 MG tablet TAKE 1 TABLET BY MOUTH TWICE A DAY AS NEEDED 10/13/20  Yes Lu Hayes DO   B-D UF III MINI PEN NEEDLES 31G X 5 MM MISC USE AS DIRECTED 10/13/20  Yes Karen Hayes DO   bumetanide (BUMEX) 1 MG tablet TAKE (1) TABLET BY MOUTH DAILY 10/13/20  Yes Lu Hayes DO   traZODone (DESYREL) 100 MG tablet TAKE (1) TABLET BY MOUTH NIGHTLY 10/13/20  Yes Karen Hayes DO   DULoxetine (CYMBALTA) 60 MG extended release capsule TAKE ONE (1) CAPSULE BY MOUTH TWICE DAILY 10/7/20  Yes Karen Hayes DO   montelukast (SINGULAIR) 10 MG tablet TAKE 1 TABLET BY MOUTH NIGHTLY 10/7/20  Yes Karen Hayes DO   simvastatin (ZOCOR) 20 MG tablet TAKE 1 TABLET BY MOUTH NIGHTLY 10/6/20  Yes Dionisio Ruiz MD   azelastine (ASTELIN) 0.1 % nasal spray 1 spray by Nasal route 2 times daily Use in each nostril as directed 9/16/20  Yes Jaymie Diaz,    ENTRESTO 24-26 MG per tablet TAKE ONE (1) TABLET BY MOUTH TWICE DAILY 9/9/20  Yes Dionisio Ruiz MD   oxybutynin (DITROPAN-XL) 10 MG extended release tablet  8/25/20  Yes Historical Provider, MD   ondansetron (ZOFRAN) 4 MG tablet TAKE 1 TABLET BY MOUTH EVERY 6 HOURS AS NEEDED FOR NAUSEA 9/1/20  Yes Historical Provider, MD   Handicap Placard MISC by Does not apply route Patient cannot walk 200 ft without stopping to rest.    Expiration 8/2022 7/30/20  Yes Karen Hayes DO   dicyclomine (BENTYL) 10 MG capsule TAKE 1 CAPSULE BY MOUTH TWICE A DAY 6/22/20  Yes Historical Provider, MD   lidocaine (XYLOCAINE) 5 % ointment APPLY TOPICALLY AS NEEDED FOR PAIN 7/17/20  Yes Karen M Freddy, DO   pantoprazole (PROTONIX) 40 MG tablet TAKE 1 TABLET BY MOUTH TWICE A DAY 7/17/20  Yes Karen Hayes, DO   Blood Glucose Monitoring Suppl (ACCU-CHEK GUIDE ME) w/Device KIT Use as directed 4/10/20  Yes Lu [employer]ands Freddy, DO   SOFT TOUCH LANCETS MISC Use 3 x daily 4/10/20  Yes Ulises Hayes,    econazole nitrate 1 % cream Apply topically daily Apply topically daily. Yes Historical Provider, MD   ketorolac (ACULAR) 0.5 % ophthalmic solution 1 drop 4 times daily   Yes Historical Provider, MD   linaCLOtide (LINZESS) 72 MCG CAPS capsule Take 72 mcg by mouth every morning (before breakfast)   Yes Historical Provider, MD   loperamide (IMODIUM) 2 MG capsule Take 2 mg by mouth 4 times daily as needed for Diarrhea   Yes Historical Provider, MD   HYDROcodone-acetaminophen (NORCO) 7.5-325 MG per tablet Take 1 tablet by mouth every 6 hours as needed for Pain. Yes Historical Provider, MD   Misc. Devices [address] Power wheelchair 11/12/19  Yes Ara Fletcher, DO   Lift Chair [facility] [address] by Does not apply route Use daily 10/10/19  Yes Ulises Hayes,    Nutritional Supplements (GLUCOSE MANAGEMENT) TABS Use daily prn if low glucose <60 6/26/19  Yes Ulises Hayes,    hydrocortisone 2.5 % cream APPLY TO AFFECTED AREA TWICE DAILY 6/26/19  Yes Ulises Hayes,    gabapentin (NEURONTIN) 300 MG capsule Take 600 mg by mouth 3 times daily. .   Yes Historical Provider, MD   olopatadine (PATADAY) 0.2 % SOLN ophthalmic solution Place 1 drop into both eyes daily   Yes Historical Provider, MD   Multiple Vitamins-Minerals (THERAPEUTIC MULTIVITAMIN-MINERALS) tablet Take 1 tablet by mouth daily   Yes Historical Provider, MD   albuterol (PROVENTIL) (2.5 MG/3ML) 0.083% nebulizer solution Take 2.5 mg by nebulization every 6 hours as needed for Wheezing   Yes Historical Provider, MD   BiPAP Machine MISC by Does not apply route nightly 27/23   Yes Therese Jordan MD       Current Medications:    Current Facility-Administered Medications: albuterol (PROVENTIL) nebulizer solution 2.5 mg, 2.5 mg, Nebulization, Q6H PRN  aspirin chewable tablet 81 mg, 81 mg, Oral, Daily  baclofen (LIORESAL) tablet 10 mg, 10 mg, Oral, BID  bumetanide (BUMEX) tablet 1 mg, 1 mg, Oral, Daily  calcium elemental (OSCAL) tablet 500 mg, 500 mg, Oral, Daily  cetirizine (ZYRTEC) tablet 10 mg, 10 mg, Oral, Daily  dicyclomine (BENTYL) capsule 10 mg, 10 mg, Oral, BID PRN  DULoxetine (CYMBALTA) extended release capsule 60 mg, 60 mg, Oral, BID  sacubitril-valsartan (ENTRESTO) 24-26 MG per tablet 1 tablet, 1 tablet, Oral, BID  fluticasone (FLONASE) 50 MCG/ACT nasal spray 1 spray, 1 spray, Each Nostril, Daily  gabapentin (NEURONTIN) capsule 600 mg, 600 mg, Oral, TID  hydrALAZINE (APRESOLINE) tablet 50 mg, 50 mg, Oral, 3 times per day  HYDROcodone-acetaminophen (NORCO) 7.5-325 MG per tablet 1 tablet, 1 tablet, Oral, Q6H PRN  isosorbide mononitrate (IMDUR) extended release tablet 60 mg, 60 mg, Oral, Daily  ketorolac (ACULAR) 0.5 % ophthalmic solution 1 drop, 1 drop, Both Eyes, 4x Daily  lidocaine (XYLOCAINE) 5 % ointment, , Topical, PRN  linaCLOtide (LINZESS) capsule 72 mcg, 72 mcg, Oral, QAM AC  loperamide (IMODIUM) capsule 2 mg, 2 mg, Oral, 4x Daily PRN  magnesium oxide (MAG-OX) tablet 400 mg, 400 mg, Oral, Daily  meloxicam (MOBIC) tablet 7.5 mg, 7.5 mg, Oral, Daily  metoclopramide (REGLAN) tablet 5 mg, 5 mg, Oral, TID AC  metoprolol succinate (TOPROL XL) extended release tablet 25 mg, 25 mg, Oral, BID  montelukast (SINGULAIR) tablet 10 mg, 10 mg, Oral, Nightly  therapeutic multivitamin-minerals 1 tablet, 1 tablet, Oral, Daily  trospium (SANCTURA) tablet 20 mg, 20 mg, Oral, BID AC  ketotifen (ZADITOR) 0.025 % ophthalmic solution 1 drop, 1 drop, Both Eyes, BID  ondansetron (ZOFRAN) tablet 4 mg, 4 mg, Oral, Q8H PRN  pantoprazole (PROTONIX) tablet 40 mg, 40 mg, Oral, BID AC  atorvastatin (LIPITOR) tablet 10 mg, 10 mg, Oral, Daily  ipratropium (ATROVENT) 0.02 % nebulizer solution 0.5 mg, 500 mcg, Nebulization, 4x Daily  traZODone (DESYREL) tablet 100 mg, 100 mg, Oral, Nightly PRN  Vitamin D (CHOLECALCIFEROL) tablet 1,000 Units, 1,000 Units, Oral, Daily  zinc sulfate (ZINCATE) capsule 50 mg, 50 mg, Oral, Daily  sodium chloride flush 0.9 % injection 10 mL, 10 mL, Intravenous, 2 times per day  sodium chloride flush 0.9 % injection 10 mL, 10 mL, Intravenous, PRN  promethazine (PHENERGAN) tablet 12.5 mg, 12.5 mg, Oral, Q6H PRN **OR** ondansetron (ZOFRAN) injection 4 mg, 4 mg, Intravenous, Q6H PRN  polyethylene glycol (GLYCOLAX) packet 17 g, 17 g, Oral, Daily PRN  acetaminophen (TYLENOL) tablet 650 mg, 650 mg, Oral, Q6H PRN **OR** acetaminophen (TYLENOL) suppository 650 mg, 650 mg, Rectal, Q6H PRN  glucose (GLUTOSE) 40 % oral gel 15 g, 15 g, Oral, PRN  dextrose 50 % IV solution, 12.5 g, Intravenous, PRN  glucagon (rDNA) injection 1 mg, 1 mg, Intramuscular, PRN  dextrose 5 % solution, 100 mL/hr, Intravenous, PRN  insulin glargine (LANTUS) injection vial 25 Units, 0.25 Units/kg, Subcutaneous, Nightly  insulin lispro (HUMALOG) injection vial 0-6 Units, 0-6 Units, Subcutaneous, TID WC  insulin lispro (HUMALOG) injection vial 0-3 Units, 0-3 Units, Subcutaneous, Nightly    Allergies:  Latex, Bee venom, Dilaudid [hydromorphone hcl], Dye [iodides], Percocet [oxycodone-acetaminophen], Keflex [cephalexin], Lasix [furosemide], Levaquin [levofloxacin in d5w], Lipitor, Lyrica [pregabalin], Morphine, Naproxen, Nefazodone, Norvasc [amlodipine], Oxycodone-acetaminophen, Shellfish-derived products, and Trazodone and nefazodone    Social History:    Current smoker: 5-6 cigarettes/day x 40 years. Uses marijuana twice per day  Denies alcohol use  Denies cocaine and heroin use. Family History: Denies family history significant for CAD. Family History   Problem Relation Age of Onset    Cancer Mother     Asthma Father        REVIEW OF SYSTEMS:     · Constitutional: +fatigue. Denies fevers, chills or night sweats  · Eyes: Denies visual changes or drainage  · ENT: +nosebleed. Denies headaches or hearing loss. No mouth sores or sore throat. No epistaxis   · Cardiovascular: See HPI. No lower extremity swelling. person, place and time. Speech clear and appropriate. Follows all commands. Pleasant affect     DATA:    ECG: See HPI. Tele strips: SR/ST. Diagnostic:    No intake or output data in the 24 hours ending 03/13/21 1009    Labs:   CBC:   Recent Labs     03/12/21 1952   WBC 8.1   HGB 15.1   HCT 47.4        BMP:   Recent Labs     03/12/21 1952      K 4.1   CO2 30*   BUN 14   CREATININE 0.9   LABGLOM >60   CALCIUM 9.3     Mag: No results for input(s): MG in the last 72 hours. Phos: No results for input(s): PHOS in the last 72 hours. TSH: No results for input(s): TSH in the last 72 hours. HgA1c:   Lab Results   Component Value Date    LABA1C 6.6 (H) 03/13/2021     proBNP:   Recent Labs     03/12/21 2054   PROBNP 360*     CARDIAC ENZYMES:  Recent Labs     03/12/21 1952   TROPONINI <0.01     FASTING LIPID PANEL:  Lab Results   Component Value Date    CHOL 131 02/05/2021    HDL 27 02/05/2021    [address] 60 02/05/2021    TRIG 219 02/05/2021     Chest x-ray: 3/12/2021  Cardiomegaly with pulmonary vascular congestion or developing interstitial pulmonary edema. Assessment/plan as per Dr. Carlos Mejia to follow.   Electronically signed by PATRIZIA Arreguin CNP on 3/13/21 at 10:27 AM EST

## 2021-03-13 NOTE — ED PROVIDER NOTES
HPI:     Tacos Mejia is a 76 y.o. female presenting to the ED for hypertension, beginning 8 hours ago. The complaint has been persistent, moderate in severity, and worsened by nothing. Patient states of the last 8 hours she is at high blood pressure at home in addition to intermittent nosebleed and a headache. States that she did have a cough for the last 2 weeks but this is largely resolved. States that the nosebleed concerns her skin to the ER for further evaluation. At this time, she states that nosebleed is actually stopped she does have a mild headache. Denies any chest pain at this time. Denies any nausea, vomiting, abdominal pain, diarrhea, constipation, urinary symptoms. Review of Systems:   Please see HPI above. All bolded are positive. All un-bolded are negative.     Constitutional Symptoms: fever, chills, fatigue, generalized weakness, diaphoresis, increase in thirst, loss of appetite  Eyes: vision change   Ears, Nose, Mouth, Throat: hearing loss, nasal congestion, sores in the mouth  Cardiovascular: chest pain, chest heaviness, palpitations  Respiratory: shortness of breath, wheezing, coughing  Gastrointestinal: abdominal pain, nausea, vomiting, diarrhea, constipation, melena, hematochezia, hematemesis  Genitourinary: dysuria, hematuria, increased frequency  Musculoskeletal: lower extremity edema, myalgias, arthralgias, back pain  Integumentary: rashes, itching   Neurological: headache, lightheadedness, dizziness, confusion, syncope, numbness, tingling, focal weakness  Psychiatric: depression, suicidal ideation, anxiety  Endocrine: unintentional weight change  Hematologic/Lymphatic: lymphadenopathy, easy bruising, easy bleeding   Allergic/Immunologic: recurrent infections            --------------------------------------------- PAST HISTORY ---------------------------------------------  Past Medical History:  has a past medical history of Asthma, Atherosclerosis of native artery of left leg with rest pain ([address] C. difficile diarrhea, CAD (coronary artery disease), Cellulitis and abscess of trunk, Cerebellar infarct (HCC), Chronic back pain, Chronic kidney disease, Chronic systolic congestive heart failure ([address] Chronic venous insufficiency, COPD (chronic obstructive pulmonary disease) ([address] Depression, Diabetes mellitus ([address] Diabetic neuropathy ([address] Diverticulosis, Fatty liver, Glaucoma, open angle, Hepatic encephalopathy ([address] Hiatal hernia, History of blood transfusion, Hyperlipidemia, Hyperplastic colon polyp, Hypertension, Incontinence, Liver mass, Morbid obesity ([address] Movement disorder, Myocardial infarction ([address] Osteoarthritis, generalized, Pain in both lower legs, Peripheral vascular disease ([address] Pneumonia, Pulmonary edema, PVD (peripheral vascular disease) with claudication ([address] Sleep apnea, Status post peripheral artery angioplasty, Tobacco abuse, Tobacco abuse, Tubular adenoma polyp of rectum, Urinary tract infection due to ESBL Klebsiella, and Ventral hernia. Past Surgical History:  has a past surgical history that includes knee surgery; Upper gastrointestinal endoscopy (12/20/12); Coronary artery bypass graft; layer wound closure (Left, [id number]); Echo Complete (10/9/2013); polysomnography (1/2016); liver biopsy (1/8/2016); bronchial brush biopsy (1/25/2013); Breast surgery (2000); Cholecystectomy (YRS AGO); Cardiac surgery (12/2011); Cardiac catheterization (04/20/2015); Hysterectomy; joint replacement (2011); Upper gastrointestinal endoscopy (12/23/2016); Colonoscopy (11/15/2013); Colonoscopy (12/23/2016); Upper gastrointestinal endoscopy (02/08/2017); Endoscopy, colon, diagnostic (04/18/2017); Gallbladder surgery; and angioplasty (11/13/2018). Social History:  reports that she has been smoking cigarettes. She started smoking about 46 years ago. She has a 10.00 pack-year smoking history. She has never used smokeless tobacco. She reports current alcohol use.  She reports current drug use. Drug: Marijuana. Family History: family history includes Asthma in her father; Cancer in her mother. The patients home medications have been reviewed.     Allergies: Latex, Bee venom, Dilaudid [hydromorphone hcl], Dye [iodides], Percocet [oxycodone-acetaminophen], Keflex [cephalexin], Lasix [furosemide], Levaquin [levofloxacin in d5w], Lipitor, Lyrica [pregabalin], Morphine, Naproxen, Nefazodone, Norvasc [amlodipine], Oxycodone-acetaminophen, Shellfish-derived products, and Trazodone and nefazodone    -------------------------------------------------- RESULTS -------------------------------------------------  All laboratory and radiology results have been personally reviewed by myself   LABS:  Results for orders placed or performed during the hospital encounter of 03/12/21   CBC auto differential   Result Value Ref Range    WBC 8.1 4.5 - 11.5 E9/L    RBC 4.68 3.50 - 5.50 E12/L    Hemoglobin 15.1 11.5 - 15.5 g/dL    Hematocrit 47.4 34.0 - 48.0 %    .3 (H) 80.0 - 99.9 fL    MCH 32.3 26.0 - 35.0 pg    MCHC 31.9 (L) 32.0 - 34.5 %    RDW 12.4 11.5 - 15.0 fL    Platelets 423 824 - 900 E9/L    MPV 10.8 7.0 - 12.0 fL    Neutrophils % 77.7 43.0 - 80.0 %    Immature Granulocytes % 0.1 0.0 - 5.0 %    Lymphocytes % 15.7 (L) 20.0 - 42.0 %    Monocytes % 4.8 2.0 - 12.0 %    Eosinophils % 1.6 0.0 - 6.0 %    Basophils % 0.1 0.0 - 2.0 %    Neutrophils Absolute 6.26 1.80 - 7.30 E9/L    Immature Granulocytes # 0.01 E9/L    Lymphocytes Absolute 1.27 (L) 1.50 - 4.00 E9/L    Monocytes Absolute 0.39 0.10 - 0.95 E9/L    Eosinophils Absolute 0.13 0.05 - 0.50 E9/L    Basophils Absolute 0.01 0.00 - 0.20 L4/X   Basic metabolic panel   Result Value Ref Range    Sodium 140 132 - 146 mmol/L    Potassium 4.1 3.5 - 5.0 mmol/L    Chloride 102 98 - 107 mmol/L    CO2 30 (H) 22 - 29 mmol/L    Anion Gap 8 7 - 16 mmol/L    Glucose 142 (H) 74 - 99 mg/dL    BUN 14 8 - 23 mg/dL    CREATININE 0.9 0.5 - 1.0 mg/dL    GFR Non-African American >60 >=60 mL/min/1.73    GFR African American >60     Calcium 9.3 8.6 - 10.2 mg/dL   Troponin   Result Value Ref Range    Troponin <0.01 0.00 - 0.03 ng/mL   Brain Natriuretic Peptide   Result Value Ref Range    Pro- (H) 0 - 125 pg/mL   EKG 12 Lead   Result Value Ref Range    Ventricular Rate 105 BPM    Atrial Rate 105 BPM    P-R Interval 172 ms    QRS Duration 140 ms    Q-T Interval 376 ms    QTc Calculation (Bazett) 496 ms    P Axis 62 degrees    R Axis 68 degrees    T Axis 41 degrees       RADIOLOGY:  Interpreted by Radiologist.  XR CHEST PORTABLE   Final Result   Cardiomegaly with pulmonary vascular congestion or developing interstitial   pulmonary edema. ------------------------- NURSING NOTES AND VITALS REVIEWED ---------------------------   The nursing notes within the ED encounter and vital signs as below have been reviewed. BP (!) 200/74   Pulse 98   Temp 97.7 °F (36.5 °C) (Temporal)   Resp 16   Ht 5' 2.5\" (1.588 m)   Wt 218 lb (98.9 kg)   LMP 01/01/1990   SpO2 97%   BMI 39.24 kg/m²   Oxygen Saturation Interpretation: Normal      ---------------------------------------------------PHYSICAL EXAM--------------------------------------      Constitutional/General: Alert and oriented x3, well appearing, non toxic in NAD  Head: Normocephalic and atraumatic  Eyes: PERRL, EOMI  Mouth: Oropharynx clear, handling secretions, no trismus  Nose: Scant amount of dried blood noted to the right naris, however no active bleeding at this time  Neck: Supple, full ROM, phonation normal  Pulmonary: Lungs clear to auscultation bilaterally, no wheezes, rales, or rhonchi. Not in respiratory distress  Cardiovascular:  Regular rate and rhythm, no murmurs, gallops, or rubs. 2+ distal pulses  Abdomen: Soft, non tender, non distended,   Extremities: Moves all extremities x 4.  Warm and well perfused  Skin: warm and dry without rash  Neurologic: GCS 15, no focal deficit noted  Psych: Normal Affect      EKG: This EKG is signed and interpreted by me. Rate: 105  Rhythm: Sinus  Interpretation: Sinus tachycardia, no STEMI noted, or right bundle branch block, inverted T waves in leads V1 through V3, biphasic T waves in V4  Comparison: Changes noted compared to prior    ------------------------------ ED COURSE/MEDICAL DECISION MAKING----------------------  Medications   hydrALAZINE (APRESOLINE) injection 5 mg (has no administration in time range)   0.9 % sodium chloride bolus (1,000 mLs Intravenous New Bag 3/12/21 1948)   labetalol (NORMODYNE;TRANDATE) injection 10 mg (10 mg Intravenous Given 3/12/21 2036)         ED COURSE:       Medical Decision Making:    Patient presents to ER today with chief complaint of hypertension and headache. Patient was found to be hypertensive and tachycardic with a systolic of over 025. Patient was given labetalol which did remit the tachycardia, however patient was given hypertensive. She was subsequently given hydralazine. Patient also has changes on her EKG. Given these findings I feel she would benefit from admission for further management and observation. Did speak to Dr. David Ortiz who is agreeable to observation at [facility].  Patient agreeable as well. All questions been answered. Counseling: The emergency provider has spoken with the patient and discussed todays results, in addition to providing specific details for the plan of care and counseling regarding the diagnosis and prognosis. Questions are answered at this time and they are agreeable with the plan.      --------------------------------- IMPRESSION AND DISPOSITION ---------------------------------    IMPRESSION  1. Hypertensive urgency    2.  Nonspecific ST-T wave electrocardiographic changes        New Prescriptions    No medications on file       DISPOSITION  Disposition: Admit to telemetry  Patient condition is stable      NOTE: This report was transcribed using voice recognition software.  Every effort was made to ensure accuracy; however, inadvertent computerized transcription errors may be present       Sukhi Dunham DO  Resident  03/12/21 [id number]

## 2021-03-14 NOTE — PROGRESS NOTES
Spoke with patient at length regarding discharge instructions and concerns that patient had with discharging. RN instructed patient on following up with PCP and Dr. Tatum Kahn as indicated times in AVS. Patient verbalized understanding of instructions.     Jayesh Reynolds RN

## 2021-03-14 NOTE — DISCHARGE SUMMARY
Hospitalist Discharge Summary    Patient ID: Jose Juan Alexandra   Patient : 1955  Patient's PCP: Judith Arreola DO    Admit Date: 3/12/2021   Admitting Physician: Stew Craft DO    Discharge Date:  3/13/2021   Discharge Physician: Valeria Mancera DO   Discharge Condition: Stable  Discharge Disposition: [facility] course in brief:  (Please refer to daily progress notes for a comprehensive review of the hospitalization by requesting medical records)    Ms. Jose Juan Alexandra, a 76y.o. year old female  who  has a past medical history of Asthma, Atherosclerosis of native artery of left leg with rest pain ([address] C. difficile diarrhea, CAD (coronary artery disease), Cellulitis and abscess of trunk, Cerebellar infarct (HCC), Chronic back pain, Chronic kidney disease, Chronic systolic congestive heart failure ([address] Chronic venous insufficiency, COPD (chronic obstructive pulmonary disease) ([address] Depression, Diabetes mellitus ([address] Diabetic neuropathy ([address] Diverticulosis, Fatty liver, Glaucoma, open angle, Hepatic encephalopathy ([address] Hiatal hernia, History of blood transfusion, Hyperlipidemia, Hyperplastic colon polyp, Hypertension, Incontinence, Liver mass, Morbid obesity ([address] Movement disorder, Myocardial infarction ([address] Osteoarthritis, generalized, Pain in both lower legs, Peripheral vascular disease ([address] Pneumonia, Pulmonary edema, PVD (peripheral vascular disease) with claudication ([address] Sleep apnea, Status post peripheral artery angioplasty, Tobacco abuse, Tobacco abuse, Tubular adenoma polyp of rectum, Urinary tract infection due to ESBL Klebsiella, and Ventral hernia.         Patient has history of hypertension essential  States that she is compliant with her medications  Today her blood pressure readings were elevated  Patient also experienced headaches and intermittent nosebleeds  Patient was evaluated at a local outpatient clinic due to the epistaxis  Evaluation it was noted that her blood pressures were elevated and patient was advised to seek treatment and evaluation at the local emergency department  Patient was evaluated [facility] emergency department  She was treated for elevated blood pressure readings  The provider at the facility reports that patient was hypertensive and tachycardic with systolic blood pressure over 200     The emergency department physician at [facility], did not does note that he felt there were some changes in her EKG but he he did not specify what changes he noted  She complains of chronic lower extremity pain which she states got worse when she had to lay on ER cart. She has chronic abdominal pain, always worse on the right than the left. She has an appointment on 3/16 with her GI specialist who manages her chronic constipation, and a colonoscopy scheduled for later this month (last was 3 years ago, mother had colon cancer.)   Cardiology evaluated patient, changed toprol to coreg and continued other medciations. BP has been reasonably controlled since midnight. Cardiology signed off, told patient to follow up in a month and that she was stable from cardiology standpoint, though they also mentioned recommending keeping her an additional night to monitor BP. Patient states symptoms which brought her to the ED had resolved. Discussed discharge home with close follow up with patient. Initially she was resistant and wanted to stay overnight, but then changed her mind and decided to go home and follow up at scheduled appointments with cardiology, GI, and for her oclonoscopy. DIscharged home in stable condition with stable vital signs and close follow up planned.       Consults:   IP CONSULT TO INTERNAL MEDICINE  IP CONSULT TO CARDIOLOGY    Discharge Diagnoses:    uncontrolled hypertension with hypertensive urgency  Epistaxis   Chronic combined systolic and diastolic heart failure  Coronary artery disease involving native coronary artery of native heart without angina pectoris  Chronic respiratory failure with hypoxia and hypercapnia   DAYAN and COPD overlap syndrome  DM2  Hyperlipidemia    Discharge Instructions / Follow up:    Future Appointments   Date Time Provider Melanie Verito   3/16/2021  3:00 PM DO Edwin Acosta [facility]   4/1/2021  2:30 PM DO Karin Lord ENT [facility]   12/16/2021  2:30 PM Deneen Watts MD [facility]       Continued appropriate risk factor modification of blood pressure, diabetes and serum lipids will remain essential to reducing risk of future atherosclerotic development    Activity: activity as tolerated    Significant labs:  CBC:   Recent Labs     03/12/21 1952   WBC 8.1   RBC 4.68   HGB 15.1   HCT 47.4   .3*   RDW 12.4        BMP:   Recent Labs     03/12/21 1952      K 4.1      CO2 30*   BUN 14   CREATININE 0.9     LFT:  No results for input(s): PROT, ALB, ALKPHOS, ALT, AST, BILITOT, AMYLASE, LIPASE in the last 72 hours. PT/INR: No results for input(s): INR, APTT in the last 72 hours. BNP: No results for input(s): BNP in the last 72 hours.   Hgb A1C:   Lab Results   Component Value Date    LABA1C 6.6 (H) 03/13/2021     Folate and B12:   Lab Results   Component Value Date    [id number] 1943 (H) 06/16/2020   ,   Lab Results   Component Value Date    FOLATE >20.0 06/16/2020     Thyroid Studies:   Lab Results   Component Value Date    TSH 0.646 02/05/2021    Y1RZAEX 7.9 11/07/2017       Urinalysis:    Lab Results   Component Value Date    NITRU Negative 09/19/2019    WBCUA 0-1 09/19/2019    WBCUA 0-1 04/25/2011    BACTERIA FEW 09/19/2019    RBCUA NONE 09/19/2019    RBCUA 0-1 01/25/2014    BLOODU negative 03/03/2020    BLOODU Negative 09/19/2019    SPECGRAV 1.015 03/03/2020    SPECGRAV 1.015 09/19/2019    GLUCOSEU negative 03/03/2020    GLUCOSEU Negative 09/19/2019    GLUCOSEU NEGATIVE 04/25/2011       Imaging:  Xr Chest Portable    Result Date: 3/12/2021  EXAMINATION: ONE XRAY VIEW OF THE CHEST 3/12/2021 6:47 pm COMPARISON: October 29, 2019 HISTORY: ORDERING SYSTEM PROVIDED HISTORY: Shortness of breath TECHNOLOGIST PROVIDED HISTORY: Reason for exam:->Shortness of breath FINDINGS: Moderate cardiomegaly. Interstitial and hazy opacities are present bilaterally. No obvious pleural effusion. No pneumothorax. Cardiomegaly with pulmonary vascular congestion or developing interstitial pulmonary edema. [facility] Digital Screen Bilateral Per Protocol    Result Date: 2/24/2021  INDICATION: Screening. HISTORY: The patient has no personal history of cancer. The patient has the following family history of breast cancer:  maternal aunt, at age 76. The patient has a history of bilateral Reduction more than 10 years ago. MAMMOGRAM VIEWS: The following mammographic views where obtained: bilateral craniocaudal; bilateral craniocaudal with tomosynthesis; bilateral mediolateral oblique; and bilateral mediolateral oblique with tomosynthesis  TOMOSYNTHESIS: Tomosynthesis (3 Dimensional Breast Imaging) was used on this examination to aid in evaluation. COMPARISON: The present examination has been compared to prior imaging studies performed at [address] [place] [address] on 07/21/2015 and 11/10/2016. CAD: This exam was reviewed using the Quanlight Computer Aided Detection (CAD)  TISSUE DENSITY: The breasts are almost entirely fat (Type 1 density). MAMMOGRAM FINDINGS: Finding 1: No suspicious masses, areas of suspicious architectural distortion, suspicious calcifications, or additional suspicious findings are identified. There are no significant changes from the prior study. IMPRESSION: No mammographic evidence of malignancy.   Screening mammogram in 1 year is recommended.  ======================================= BI-RADS Category 1:  Negative =======================================  RISK ASSESSMENT: During this patient's visit, information obtained was used to generate a lifetime risk assessment using the Tyrer-Cuzick model (also called the RAKEL [International Breast Cancer Intervention Study] Breast Cancer Risk Evaluation Tool). - LIFETIME RISK - Patient has a Tyrer Cuzick score of 4.3% - BREAST TISSUE DENSITY - The breasts are almost entirely fat  -AVERAGE RISK (<15% ) Yearly screening mammograms starting at age 36 and older. Regardless of breast density, tomosynthesis is suggested. Monthly self-breast exams are recommended for women age 27 and older. NOTE: Mammography is not 100% accurate in the detection of breast cancer. Accuracy decreases as mammographic density of the breast increases. A negative mammogram should not deter further evaluation (including biopsy) of suspicious physical findings. Recommendations are based on NCCN ([address]) and ACR Rob Sarath of Radiology) guidelines. Thank you for sending your patient to this ACR and FDA approved facility. If there are any physician concerns regarding this report, a Radiologist can be reached by calling the following number [phone]. Follow up Protocol for the [facility]: BI-RADS 1 or 2:  Center will send a reminder when next mammogram is due. BI-RADS 3:  Center will send a reminder for recommended short term follow up. BI-RADS 0, 4 or 5:  Center will contact patient to schedule all additional views and procedures.          Discharge Medications:      Medication List      START taking these medications    carvedilol 12.5 MG tablet  Commonly known as: COREG  Take 1 tablet by mouth 2 times daily (with meals)        CONTINUE taking these medications    Accu-Chek Guide Me w/Device Kit  Use as directed     Accu-Chek Guide strip  Generic drug: blood glucose test strips  1 each by In Vitro route 3 times daily     albuterol (2.5 MG/3ML) 0.083% nebulizer solution  Commonly known as: PROVENTIL     aspirin 81 MG chewable tablet  Commonly known as: Aspirin Low Dose  TAKE 1 TABLET BY MOUTH EVERY DAY     azelastine 0.1 % nasal spray  Commonly known as: ASTELIN  1 spray by Nasal route 2 times daily Use in each nostril as directed     B-D UF III MINI PEN NEEDLES 31G X 5 MM Misc  Generic drug: Insulin Pen Needle  USE AS DIRECTED     baclofen 10 MG tablet  Commonly known as: LIORESAL  TAKE 1 TABLET BY MOUTH TWICE A DAY AS NEEDED     BiPAP Machine Misc     bumetanide 1 MG tablet  Commonly known as: BUMEX  TAKE (1) TABLET BY MOUTH DAILY     calcium carbonate 500 MG Tabs tablet  Commonly known as: OSCAL     cetirizine 10 MG tablet  Commonly known as: ZYRTEC  TAKE 1 TABLET BY MOUTH EVERY DAY     [employer] Alcohol Prep Swabs 70 % Pads  Use daily     [employer] Gas Relief Ultra Strength 180 MG Caps  Generic drug: Simethicone     dicyclomine 10 MG capsule  Commonly known as: BENTYL     DULoxetine 60 MG extended release capsule  Commonly known as: CYMBALTA  TAKE ONE (1) CAPSULE BY MOUTH TWICE DAILY     Entresto 24-26 MG per tablet  Generic drug: sacubitril-valsartan  TAKE ONE (1) TABLET BY MOUTH TWICE DAILY     fluticasone 50 MCG/ACT nasal spray  Commonly known as: FLONASE  USE 1 SPRAY IN EACH NOSTRIL TWICE A DAY     gabapentin 300 MG capsule  Commonly known as: NEURONTIN     Glucose Management Tabs  Use daily prn if low glucose <60     Handicap Placard Misc  by Does not apply route Patient cannot walk 200 ft without stopping to rest.    Expiration 8/2022     hydrALAZINE 50 MG tablet  Commonly known as: APRESOLINE  TAKE 1 TABLET BY MOUTH THREE TIMES A DAY     hydrocortisone 2.5 % cream  APPLY TO AFFECTED AREA TWICE DAILY     isosorbide mononitrate 60 MG extended release tablet  Commonly known as: IMDUR  TAKE (1) TABLET BY MOUTH DAILY     ketorolac 0.5 % ophthalmic solution  Commonly known as: ACULAR     lidocaine 5 % ointment  Commonly known as: XYLOCAINE  APPLY TOPICALLY AS NEEDED FOR PAIN     Lift Chair Misc  by Does not apply route Use daily     Linzess 72 MCG Caps capsule  Generic cream     HYDROcodone-acetaminophen 7.5-325 MG per tablet  Commonly known as: NORCO     metoprolol succinate 25 MG extended release tablet  Commonly known as: TOPROL XL           Where to Get Your Medications      These medications were sent to [address] [address] [address] [facility]  [address] [facility], [address] [place] [address]    Phone: [phone]   · carvedilol 12.5 MG tablet         Time Spent on discharge is more than 45 minutes in the examination, evaluation, counseling and review of medications and discharge plan.    +++++++++++++++++++++++++++++++++++++++++++++++++  [address]  +++++++++++++++++++++++++++++++++++++++++++++++++

## 2021-03-15 ENCOUNTER — TELEPHONE (OUTPATIENT)
Dept: CARDIOLOGY CLINIC | Age: 66
End: 2021-03-15

## 2021-03-15 DIAGNOSIS — I50.42 CHRONIC COMBINED SYSTOLIC AND DIASTOLIC HEART FAILURE (HCC): Primary | ICD-10-CM

## 2021-03-15 DIAGNOSIS — I10 ESSENTIAL HYPERTENSION: Chronic | ICD-10-CM

## 2021-03-15 NOTE — TELEPHONE ENCOUNTER
Patient notified of Dr. Boateng's recommendations. Aldactone e-scribed to pharmacy. Order placed for BMP.

## 2021-03-15 NOTE — TELEPHONE ENCOUNTER
Started on Aldactone 25 mg p.o. daily check BMP in 1 week. She has to follow less than 2 g salt diet.

## 2021-03-16 RX ORDER — SPIRONOLACTONE 25 MG/1
25 TABLET ORAL DAILY
Qty: 30 TABLET | Refills: 11 | Status: SHIPPED
Start: 2021-03-16 | End: 2021-09-15 | Stop reason: SDUPTHER

## 2021-03-19 ENCOUNTER — TELEPHONE (OUTPATIENT)
Dept: CARDIOLOGY CLINIC | Age: 66
End: 2021-03-19

## 2021-03-19 RX ORDER — CARVEDILOL 25 MG/1
25 TABLET ORAL 2 TIMES DAILY WITH MEALS
COMMUNITY
End: 2021-04-06 | Stop reason: SDUPTHER

## 2021-03-19 NOTE — TELEPHONE ENCOUNTER
Patient called stating her BP has been going up since starting Aldactone. Today:    157/101 (L)  147/101 (R)    Yesterday:    198/120 (L)  130/101 (R)    Please advise.

## 2021-03-22 ENCOUNTER — HOSPITAL ENCOUNTER (OUTPATIENT)
Age: 66
Discharge: HOME | End: 2021-03-22
Payer: MEDICARE

## 2021-03-22 LAB
ANION GAP SERPL CALCULATED.3IONS-SCNC: 6 MMOL/L (ref 7–16)
BUN BLDV-MCNC: 18 MG/DL (ref 8–23)
CALCIUM SERPL-MCNC: 8.9 MG/DL (ref 8.6–10.2)
CHLORIDE BLD-SCNC: 102 MMOL/L (ref 98–107)
CO2: 31 MMOL/L (ref 22–29)
CREAT SERPL-MCNC: 1 MG/DL (ref 0.5–1)
GFR AFRICAN AMERICAN: >60
GFR NON-AFRICAN AMERICAN: >60 ML/MIN/1.73
GLUCOSE BLD-MCNC: 127 MG/DL (ref 74–99)
POTASSIUM SERPL-SCNC: 4.4 MMOL/L (ref 3.5–5)
SODIUM BLD-SCNC: 139 MMOL/L (ref 132–146)

## 2021-03-22 PROCEDURE — 36415 COLL VENOUS BLD VENIPUNCTURE: CPT

## 2021-03-22 PROCEDURE — 80048 BASIC METABOLIC PNL TOTAL CA: CPT

## 2021-03-23 RX ORDER — FLUTICASONE PROPIONATE 50 MCG
SPRAY, SUSPENSION (ML) NASAL
Qty: 1 BOTTLE | Refills: 1 | Status: SHIPPED
Start: 2021-03-23 | End: 2021-04-14

## 2021-03-26 ENCOUNTER — TELEPHONE (OUTPATIENT)
Dept: CARDIOLOGY CLINIC | Age: 66
End: 2021-03-26

## 2021-03-26 NOTE — TELEPHONE ENCOUNTER
Patient called stating her BP is still elevated after increasing Coreg to 25 mg BID on 3/19/21.    180/110  Left arm  179/102  Right arm    In discussing medications with patient, it appears that she is not taking Hydralazine 50 mg TID. She did not pick it up from the pharmacy. Please advise.

## 2021-03-30 ENCOUNTER — TELEPHONE (OUTPATIENT)
Dept: CARDIOLOGY CLINIC | Age: 66
End: 2021-03-30

## 2021-03-30 NOTE — TELEPHONE ENCOUNTER
Patient notified of Dr. Rachana Simon recommendation. She states she is concerned because her BP/P in her left arm is 144/91 (86), and her head is throbbing. She has been taking Aleve with no relief. Please advise.

## 2021-03-30 NOTE — TELEPHONE ENCOUNTER
Patient called on 3/26/21 with BPs. At that time, she advised that she was not taking Hydralazine 50 mg TID. However,  today she calls and states she actually had already been taking it. Her BP/P today is 130/83 (85). Please advise.

## 2021-03-31 NOTE — TELEPHONE ENCOUNTER
She should not take aleve that can raise BP ,  Take only tylenol. BP is a little high and that should not give that much headache. Monitor BP for the next 48  Hours 3 times a day and call.  If headache gets any worse then go to ER

## 2021-03-31 NOTE — TELEPHONE ENCOUNTER
Patient notified of Dr. Boateng's assessment/recommendations. She will switch to Tylenol, keep track of BP/P and call me before the end of the day tomorrow as we are closed on Friday.

## 2021-04-01 RX ORDER — CLONIDINE HYDROCHLORIDE 0.1 MG/1
0.1 TABLET ORAL 2 TIMES DAILY
Qty: 60 TABLET | Refills: 11 | Status: SHIPPED
Start: 2021-04-01 | End: 2021-04-06 | Stop reason: ALTCHOICE

## 2021-04-05 ENCOUNTER — TELEPHONE (OUTPATIENT)
Dept: CARDIOLOGY CLINIC | Age: 66
End: 2021-04-05

## 2021-04-05 NOTE — TELEPHONE ENCOUNTER
Patient called with BP/P since adding Clonidine 0.1 mg BID on 4/1/21. She ran out of Coreg and has not taken any since Friday. She states her headache is worse now with the lower BP.      98/65 (90) right arm  111/57 (88) left arm    Please advise.

## 2021-04-06 RX ORDER — DULOXETIN HYDROCHLORIDE 60 MG/1
CAPSULE, DELAYED RELEASE ORAL
Qty: 60 CAPSULE | Refills: 1 | Status: SHIPPED
Start: 2021-04-06 | End: 2021-06-02

## 2021-04-06 RX ORDER — BACLOFEN 10 MG/1
TABLET ORAL
Qty: 180 TABLET | Refills: 1 | Status: SHIPPED
Start: 2021-04-06 | End: 2022-03-31

## 2021-04-06 RX ORDER — OXYBUTYNIN CHLORIDE 10 MG/1
TABLET, EXTENDED RELEASE ORAL
Qty: 30 TABLET | Refills: 11 | OUTPATIENT
Start: 2021-04-06

## 2021-04-06 RX ORDER — CARVEDILOL 25 MG/1
25 TABLET ORAL 2 TIMES DAILY WITH MEALS
Qty: 180 TABLET | Refills: 3 | Status: ON HOLD
Start: 2021-04-06 | End: 2021-04-24 | Stop reason: HOSPADM

## 2021-04-06 NOTE — TELEPHONE ENCOUNTER
Please ask her to make sure she is taking medications appropriately. She can stop the clonidine for now and start on Coreg starting from tomorrow. If she does not take medications as instructed is difficult to manage her blood pressure.

## 2021-04-13 RX ORDER — CETIRIZINE HYDROCHLORIDE 10 MG/1
TABLET ORAL
Qty: 90 TABLET | Refills: 1 | Status: SHIPPED
Start: 2021-04-13 | End: 2022-04-11

## 2021-04-14 RX ORDER — FLUTICASONE PROPIONATE 50 MCG
SPRAY, SUSPENSION (ML) NASAL
Qty: 1 BOTTLE | Refills: 1 | Status: SHIPPED
Start: 2021-04-14 | End: 2021-05-11

## 2021-04-15 ENCOUNTER — HOSPITAL ENCOUNTER (OUTPATIENT)
Age: 66
Discharge: HOME | End: 2021-04-15
Payer: MEDICARE

## 2021-04-15 ENCOUNTER — OFFICE VISIT (OUTPATIENT)
Dept: FAMILY MEDICINE CLINIC | Age: 66
End: 2021-04-15
Payer: MEDICARE

## 2021-04-15 VITALS
HEART RATE: 90 BPM | RESPIRATION RATE: 18 BRPM | DIASTOLIC BLOOD PRESSURE: 78 MMHG | OXYGEN SATURATION: 93 % | BODY MASS INDEX: 39.41 KG/M2 | TEMPERATURE: 98.2 F | WEIGHT: 222.4 LBS | HEIGHT: 63 IN | SYSTOLIC BLOOD PRESSURE: 144 MMHG

## 2021-04-15 DIAGNOSIS — J96.12 CHRONIC RESPIRATORY FAILURE WITH HYPOXIA AND HYPERCAPNIA (HCC): ICD-10-CM

## 2021-04-15 DIAGNOSIS — J96.11 CHRONIC RESPIRATORY FAILURE WITH HYPOXIA AND HYPERCAPNIA (HCC): ICD-10-CM

## 2021-04-15 DIAGNOSIS — G47.33 OSA AND COPD OVERLAP SYNDROME (HCC): ICD-10-CM

## 2021-04-15 DIAGNOSIS — K58.9 IRRITABLE BOWEL SYNDROME, UNSPECIFIED TYPE: ICD-10-CM

## 2021-04-15 DIAGNOSIS — Z00.00 ROUTINE GENERAL MEDICAL EXAMINATION AT A HEALTH CARE FACILITY: Primary | ICD-10-CM

## 2021-04-15 DIAGNOSIS — I50.42 CHRONIC COMBINED SYSTOLIC AND DIASTOLIC HEART FAILURE (HCC): ICD-10-CM

## 2021-04-15 DIAGNOSIS — E11.65 UNCONTROLLED TYPE 2 DIABETES MELLITUS WITH HYPERGLYCEMIA (HCC): ICD-10-CM

## 2021-04-15 DIAGNOSIS — J44.9 OSA AND COPD OVERLAP SYNDROME (HCC): ICD-10-CM

## 2021-04-15 LAB
ANION GAP SERPL CALCULATED.3IONS-SCNC: 8 MMOL/L (ref 7–16)
BASOPHILS ABSOLUTE: 0.01 E9/L (ref 0–0.2)
BASOPHILS RELATIVE PERCENT: 0.2 % (ref 0–2)
BUN BLDV-MCNC: 19 MG/DL (ref 8–23)
CALCIUM SERPL-MCNC: 9 MG/DL (ref 8.6–10.2)
CHLORIDE BLD-SCNC: 100 MMOL/L (ref 98–107)
CO2: 30 MMOL/L (ref 22–29)
CREAT SERPL-MCNC: 1.1 MG/DL (ref 0.5–1)
EOSINOPHILS ABSOLUTE: 0.03 E9/L (ref 0.05–0.5)
EOSINOPHILS RELATIVE PERCENT: 0.6 % (ref 0–6)
GFR AFRICAN AMERICAN: >60
GFR NON-AFRICAN AMERICAN: >60 ML/MIN/1.73
GLUCOSE BLD-MCNC: 191 MG/DL (ref 74–99)
HCT VFR BLD CALC: 40.4 % (ref 34–48)
HEMOGLOBIN: 12.8 G/DL (ref 11.5–15.5)
IMMATURE GRANULOCYTES #: 0.01 E9/L
IMMATURE GRANULOCYTES %: 0.2 % (ref 0–5)
LYMPHOCYTES ABSOLUTE: 0.53 E9/L (ref 1.5–4)
LYMPHOCYTES RELATIVE PERCENT: 10.4 % (ref 20–42)
MCH RBC QN AUTO: 31.1 PG (ref 26–35)
MCHC RBC AUTO-ENTMCNC: 31.7 % (ref 32–34.5)
MCV RBC AUTO: 98.3 FL (ref 80–99.9)
MONOCYTES ABSOLUTE: 0.35 E9/L (ref 0.1–0.95)
MONOCYTES RELATIVE PERCENT: 6.9 % (ref 2–12)
NEUTROPHILS ABSOLUTE: 4.15 E9/L (ref 1.8–7.3)
NEUTROPHILS RELATIVE PERCENT: 81.7 % (ref 43–80)
PDW BLD-RTO: 12.8 FL (ref 11.5–15)
PHOSPHORUS: 2.9 MG/DL (ref 2.5–4.5)
PLATELET # BLD: 193 E9/L (ref 130–450)
PMV BLD AUTO: 10.3 FL (ref 7–12)
POTASSIUM SERPL-SCNC: 4.1 MMOL/L (ref 3.5–5)
RBC # BLD: 4.11 E12/L (ref 3.5–5.5)
SODIUM BLD-SCNC: 138 MMOL/L (ref 132–146)
WBC # BLD: 5.1 E9/L (ref 4.5–11.5)

## 2021-04-15 PROCEDURE — 80048 BASIC METABOLIC PNL TOTAL CA: CPT

## 2021-04-15 PROCEDURE — G0439 PPPS, SUBSEQ VISIT: HCPCS | Performed by: FAMILY MEDICINE

## 2021-04-15 PROCEDURE — 36415 COLL VENOUS BLD VENIPUNCTURE: CPT

## 2021-04-15 PROCEDURE — 85025 COMPLETE CBC W/AUTO DIFF WBC: CPT

## 2021-04-15 PROCEDURE — 84100 ASSAY OF PHOSPHORUS: CPT

## 2021-04-15 RX ORDER — INSULIN DEGLUDEC 200 U/ML
INJECTION, SOLUTION SUBCUTANEOUS
Qty: 13 PEN | Refills: 2 | Status: SHIPPED
Start: 2021-04-15 | End: 2022-04-11

## 2021-04-15 ASSESSMENT — PATIENT HEALTH QUESTIONNAIRE - PHQ9
SUM OF ALL RESPONSES TO PHQ QUESTIONS 1-9: 2
2. FEELING DOWN, DEPRESSED OR HOPELESS: 1
1. LITTLE INTEREST OR PLEASURE IN DOING THINGS: 1

## 2021-04-15 ASSESSMENT — LIFESTYLE VARIABLES: HOW OFTEN DO YOU HAVE A DRINK CONTAINING ALCOHOL: 0

## 2021-04-15 NOTE — PROGRESS NOTES
Medicare Annual Wellness Visit  Name: Kishan Tipton Date: 4/15/2021   MRN: [id number] Sex: Female   Age: 76 y.o. Ethnicity: Non-/Non    : 1955 Race: Alexander Rocha is here for Medicare AWV    Screenings for behavioral, psychosocial and functional/safety risks, and cognitive dysfunction are all negative except as indicated below. These results, as well as other patient data from the [address] form, are documented in Flowsheets linked to this Encounter. Allergies   Allergen Reactions    Latex Hives    Bee Venom Anaphylaxis    Dilaudid [Hydromorphone Hcl] Itching    Dye [Iodides] Hives and Shortness Of Breath    Percocet [Oxycodone-Acetaminophen] Shortness Of Breath and Itching    Keflex [Cephalexin] Itching and Rash    Lasix [Furosemide] Other (See Comments)     Pt states she cramps up and gets headaches    Levaquin [Levofloxacin In D5w] Hives    Lipitor      MUSCLE SPASMS    Lyrica [Pregabalin]      Dream disturbances    Morphine Hives    Naproxen      Unsure of reaction;pt states able to take Aleve without difficulties    Nefazodone Other (See Comments)    Norvasc [Amlodipine] Swelling    Oxycodone-Acetaminophen Swelling    Shellfish-Derived Products     Trazodone And Nefazodone          Prior to Visit Medications    Medication Sig Taking?  Authorizing Provider   Insulin Degludec (TRESIBA FLEXTOUCH) 200 UNIT/ML SOPN INJECT 50 UNITS INTO THE SKIN nightly Yes Jill Hayes, DO   fluticasone (FLONASE) 50 MCG/ACT nasal spray USE 1 SPRAY IN EACH NOSTRIL TWICE A DAY Yes Karen Hayes DO   cetirizine (ZYRTEC) 10 MG tablet TAKE 1 TABLET BY MOUTH EVERY DAY Yes Karen Hayes DO   DULoxetine (CYMBALTA) 60 MG extended release capsule TAKE ONE (1) CAPSULE BY MOUTH TWICE DAILY Yes Karen Hayes DO   baclofen (LIORESAL) 10 MG tablet TAKE 1 TABLET BY MOUTH TWICE A DAY AS NEEDED Yes Karen Hayes, DO   carvedilol (COREG) 25 MG tablet Take 1 tablet by mouth 2 times daily (with meals) Yes Stephany Tatum MD   spironolactone (ALDACTONE) 25 MG tablet Take 1 tablet by mouth daily Yes Stephany Tatum MD   triamcinolone (KENALOG) 0.1 % cream APPLY TWICE DAILY TO RASH Yes Historical Provider, MD   senna (SENOKOT) 8.6 MG tablet Senna Laxative 8.6 mg tablet Yes Historical Provider, MD   potassium chloride (KLOR-CON 10) 10 MEQ extended release tablet Klor-Con 10 mEq tablet,extended release Yes Historical Provider, MD   polyethylene glycol (GLYCOLAX) 17 GM/SCOOP powder polyethylene glycol 3350 17 gram/dose oral powder Yes Historical Provider, MD   omeprazole (PRILOSEC) 40 MG delayed release capsule  Yes Historical Provider, MD   Neomycin-Polymyxin-Dexameth 0.1 % OINT neomycin 3.5 mg/g-polymyxin B 10,000 unit/g-dexameth 0.1 % eye oint Yes Historical Provider, MD   Lidocaine, Anorectal, 5 % CREA Apply topically as needed Yes Historical Provider, MD   lactulose (CHRONULAC) 10 GM/15ML solution  Yes Historical Provider, MD   famotidine (PEPCID) 40 MG tablet  Yes Historical Provider, MD   docusate sodium (COLACE) 100 MG capsule TAKE 2 CAPSULES BY MOUTH AT BEDTIME Yes Historical Provider, MD   dilTIAZem (CARDIZEM) 120 MG tablet  Yes Historical Provider, MD   ciclopirox (LOPROX) 0.77 % cream Apply topically 2 times daily Yes Historical Provider, MD   cholestyramine (QUESTRAN) 4 g packet cholestyramine (with sugar) 4 gram oral powder Yes Historical Provider, MD   ammonium lactate (LAC-HYDRIN) 12 % lotion APPLY EVERY DAY AFTER SHOWER Yes Historical Provider, MD   hydrALAZINE (APRESOLINE) 50 MG tablet TAKE 1 TABLET BY MOUTH THREE TIMES A DAY Yes Karen Hayes, DO   meloxicam (MOBIC) 7.5 MG tablet TAKE 1 TABLET BY MOUTH EVERY DAY Yes Jarett Hayes, DO   Misc.  Devices (NOVA CUSHION GEL SEAT PAD) MISC Use daily Yes Jarett Hayes, DO   aspirin (ASPIRIN LOW DOSE) 81 MG chewable tablet TAKE 1 TABLET BY MOUTH EVERY DAY Yes Missy Leos, DO   Alcohol Swabs ([employer] ALCOHOL PREP SWABS) 70 % PADS Use daily Yes Patel Hayes, DO   blood glucose test strips (ACCU-CHEK GUIDE) strip 1 each by In Vitro route 3 times daily Yes Patel Hayes, DO   Accu-Chek FastClix Lancets MISC Use 4x daily Yes Karen Hayes DO   [employer] GAS RELIEF ULTRA STRENGTH 180 MG CAPS TAKE 1 CAPSULE BY MOUTH TWICE A DAY Yes Historical Provider, MD   ROBAFEN 100 MG/5ML syrup TAKE 20 MLS BY MOUTH 3 TIMES DAILY AS NEEDED FOR COUGH Yes Historical Provider, MD   Boswellia-Glucosamine-Vit D (OSTEO BI-FLEX ONE PER DAY PO) Take by mouth Yes Historical Provider, MD   calcium carbonate (OSCAL) 500 MG TABS tablet Take 500 mg by mouth daily Yes Historical Provider, MD   magnesium oxide (MAG-OX) 400 MG tablet Take 400 mg by mouth daily Yes Historical Provider, MD   zinc 50 MG TABS tablet Take 50 mg by mouth daily Yes Historical Provider, MD   vitamin D3 (CHOLECALCIFEROL) 25 MCG (1000 UT) TABS tablet TAKE 1 TABLET BY MOUTH EVERY DAY Yes Karen Hayes DO   SPIRIVA RESPIMAT 1.25 MCG/ACT AERS inhaler INHALE TWO (2) PUFFS BY MOUTH EVERY DAY Yes Karen Hayes DO   isosorbide mononitrate (IMDUR) 60 MG extended release tablet TAKE (1) TABLET BY MOUTH DAILY Yes Dang Escalante MD   MYRBETRIQ 50 MG TB24 TAKE 1 TABLET BY MOUTH EVERY DAY Yes Karen Hayes DO   B-D UF III MINI PEN NEEDLES 31G X 5 MM MISC USE AS DIRECTED Yes Karen Hayes DO   bumetanide (BUMEX) 1 MG tablet TAKE (1) TABLET BY MOUTH DAILY Yes Karen Hayes DO   traZODone (DESYREL) 100 MG tablet TAKE (1) TABLET BY MOUTH NIGHTLY Yes Karen Hayes DO   montelukast (SINGULAIR) 10 MG tablet TAKE 1 TABLET BY MOUTH NIGHTLY Yes Patel Hayes,    simvastatin (ZOCOR) 20 MG tablet TAKE 1 TABLET BY MOUTH NIGHTLY Yes Dang Escalante MD   azelastine (ASTELIN) 0.1 % nasal spray 1 spray by Nasal route 2 times daily Use in each nostril as directed Yes Audrey Diaz,    ENTRESTO 24-26 MG per tablet TAKE ONE (1) TABLET rest pain ([address] 11/9/2018    C. difficile diarrhea 2015    CAD (coronary artery disease) 2011    Cellulitis and abscess of trunk 4/14/2015    Cerebellar infarct ([address] 4/3/2019    Remote, rt. cerebellum, head CT scan, 1/9/19    Chronic back pain     Chronic kidney disease     Chronic systolic congestive heart failure ([address] 10/4/2013    Chronic venous insufficiency 10/11/2017    COPD (chronic obstructive pulmonary disease) ([address]     Depression     Diabetes mellitus ([address]     Diabetic neuropathy ([address]     Diverticulosis     Fatty liver 1/8/2016    per US    Glaucoma, open angle 2/1/2016    Mild-OU    Hepatic encephalopathy ([address] 02/07/2016    resolved    Hiatal hernia     History of blood transfusion     Hyperlipidemia     Hyperplastic colon polyp     Hypertension     Incontinence     Liver mass     Morbid obesity ([address]     Movement disorder     Myocardial infarction ([address] 2011    Osteoarthritis, generalized     Pain in both lower legs 10/11/2017    Peripheral vascular disease ([address]     Pneumonia 1/26/2014    Pulmonary edema     resolved    PVD (peripheral vascular disease) with claudication ([address] 8/30/2017    Sleep apnea     uses BI PAP    Status post peripheral artery angioplasty 10/31/2019    Tobacco abuse     Tobacco abuse 10/11/2017    Tubular adenoma polyp of rectum     Urinary tract infection due to ESBL Klebsiella 03/08/2017    Ventral hernia        Past Surgical History:   Procedure Laterality Date    ANGIOPLASTY  11/13/2018    Dr. Vyas Nurse & athrectomy L SFA & Popliteal    BREAST SURGERY  2000    bilateral reduction    BRONCHIAL BRUSH BIOPSY  1/25/2013    Dr Rodger Castro  04/20/2015    Dr. Lord Arroyo  12/2011     DR. Lazarus Oddi,  follows Dr Cong Wright  11/15/2013    Dr Mag Viramontes COLONOSCOPY  12/23/2016    Dr Portia Jhaveri adenoma & hyperplastic polyps, melanosis coli (repeat one year 12/2017)    CORONARY ARTERY BYPASS GRAFT      ECHO COMPLETE  10/9/2013         ENDOSCOPY, COLON, DIAGNOSTIC  04/18/2017    GALLBLADDER SURGERY      gallstones removed, [facility] 8/2017    HYSTERECTOMY      JOINT REPLACEMENT  2011    LEFT KNEE    KNEE SURGERY      left knee replacement    LAYER WOUND CLOSURE Left [id number]   [facility] LIVER BIOPSY  1/8/2016    [facility]    POLYSOMNOGRAPHY  1/2016    [facility]    UPPER GASTROINTESTINAL ENDOSCOPY  12/20/12    UPPER GASTROINTESTINAL ENDOSCOPY  12/23/2016    Dr Jimena Silvestre UPPER GASTROINTESTINAL ENDOSCOPY  02/08/2017         Family History   Problem Relation Age of Onset    Cancer Mother     Asthma Father        CareTeam (Including outside providers/suppliers regularly involved in providing care):   Patient Care Team:  Eleanor Damon DO as PCP - General (Family Medicine)  Eleanor Damon DO as PCP - [facility] Empaneled Provider  Diane Flood MD as Consulting Physician (Pulmonology)  Sahil Valencia MD as Consulting Physician (Cardiology)    Wt Readings from Last 3 Encounters:   04/15/21 222 lb 6.4 oz (100.9 kg)   03/13/21 217 lb 9.6 oz (98.7 kg)   12/10/20 215 lb (97.5 kg)     Vitals:    04/15/21 1427 04/15/21 1434   BP: (!) 96/48 (!) 144/78   Site: Left Upper Arm Right Upper Arm   Position: Sitting Sitting   Cuff Size: Large Adult Large Adult   Pulse: 90    Resp: 18    Temp: 98.2 °F (36.8 °C)    TempSrc: Temporal    SpO2: 93%    Weight: 222 lb 6.4 oz (100.9 kg)    Height: 5' 2.5\" (1.588 m)      Body mass index is 40.03 kg/m². Based upon direct observation of the patient, evaluation of cognition reveals recent and remote memory intact. Patient's complete Health Risk Assessment and screening values have been reviewed and are found in Flowsheets. The following problems were reviewed today and where indicated follow up appointments were made and/or referrals ordered.     Positive Risk Factor Screenings with Interventions:         Substance History:  Social History     Tobacco History     Smoking Status  Current Every Day Smoker Smoking Start Date  8/10/1974 Smoking Frequency  0.25 packs/day for 40 years (10 pk yrs) Smoking Tobacco Type  Cigarettes    Smokeless Tobacco Use  Never Used    Tobacco Comment  1 pack every 3 days (6-7 cig)          Alcohol History     Alcohol Use Status  Yes Drinks/Week  0 Standard drinks or equivalent per week Amount  0.0 standard drinks of alcohol/wk Comment  social          Drug Use     Drug Use Status  Yes Types  Marijuana Comment  \"every 4th or 5th day out of the week\"          Sexual Activity     Sexually Active  Never               Alcohol Screening:       A score of 8 or more is associated with harmful or hazardous drinking. A score of 13 or more in women, and 15 or more in men, is likely to indicate alcohol dependence. Substance Abuse Interventions:  · Recreational drug use:  patient is not ready to change his/her recreational drug use behavior at this time    General Health and ACP:  General  In general, how would you say your health is?: (!) Poor  In the past 7 days, have you experienced any of the following?  New or Increased Pain, New or Increased Fatigue, Loneliness, Social Isolation, Stress or Anger?: (!) New or Increased Pain  Do you get the social and emotional support that you need?: Yes  Do you have a Living Will?: Yes  Advance Directives     Power of  Living Will ACP-Advance Directive ACP-Power of     Not on File Filed on 12/23/16 Filed Not on File      General Health Risk Interventions:  · Poor self-assessment of health status: patient declines any further evaluation/treatment for this issue    Health Habits/Nutrition:  Health Habits/Nutrition  Do you exercise for at least 20 minutes 2-3 times per week?: (!) No  Have you lost any weight without trying in the past 3 months?: No  Do you eat only one meal per day?: No  Have you seen the dentist within the past year?: (!) No  Body mass index: Pam [place] ) 40.02  Health Habits/Nutrition Interventions:  · Inadequate physical activity:  patient is not ready to increase his/her physical activity level at this time    Hearing/Vision:  No exam data present  Hearing/Vision  Do you or your family notice any trouble with your hearing that hasn't been managed with hearing aids?: (!) Yes  Do you have difficulty driving, watching TV, or doing any of your daily activities because of your eyesight?: (!) Yes  Have you had an eye exam within the past year?: Yes  Hearing/Vision Interventions:  · Vision concerns:  patient encouraged to make appointment with his/her eye specialist     ADL:  ADLs  In the past 7 days, did you need help from others to perform any of the following everyday activities? Eating, dressing, grooming, bathing, toileting, or walking/balance?: (!) Bathing  In the past 7 days, did you need help from others to take care of any of the following?  Laundry, housekeeping, banking/finances, shopping, telephone use, food preparation, transportation, or taking medications?: (!) Food Preparation, Laundry, Transportation  ADL Interventions:  · Patient declines any further evaluation/treatment for this issue    Personalized Preventive Plan   Current Health Maintenance Status  Immunization History   Administered Date(s) Administered    Pneumococcal Polysaccharide ([id number]) 09/08/2020        Health Maintenance   Topic Date Due    Hepatitis A vaccine (1 of 2 - Risk 2-dose series) Never done    COVID-19 Vaccine (1) Never done    DTaP/Tdap/Td vaccine (1 - Tdap) Never done    Shingles Vaccine (1 of 2) Never done    Diabetic foot exam  11/08/2019    Annual Wellness Visit (AWV)  Never done    Diabetic retinal exam  07/15/2020    A1C test (Diabetic or Prediabetic)  06/13/2021    Flu vaccine (Season Ended) 09/01/2021    Diabetic microalbuminuria test  02/05/2022    Lipid screen  02/05/2022    Potassium monitoring  03/22/2022    Creatinine monitoring  03/22/2022    Breast cancer screen  02/23/2023    Colon cancer screen colonoscopy  10/31/2023    DEXA (modify frequency per FRAX score)  Completed    Pneumococcal 65+ years Vaccine  Completed    Hepatitis C screen  Completed    Hib vaccine  Aged Out    Meningococcal (ACWY) vaccine  Aged Out     Recommendations for [employer] Due: see orders and patient instructions/AVS.  . Recommended screening schedule for the next 5-10 years is provided to the patient in written form: see Patient Tyson Og was seen today for medicare awv. Diagnoses and all orders for this visit:    Routine general medical examination at a health care facility    Uncontrolled type 2 diabetes mellitus with hyperglycemia ([facility]    Irritable bowel syndrome, unspecified type  -     External Referral To Gastroenterology    Other orders  -     Insulin Degludec (TRESIBA FLEXTOUCH) 200 UNIT/ML SOPN; INJECT 50 UNITS INTO THE SKIN nightly                Patient is on multiple medications and her med list is extremely long. I do not think we have an accurate med list and we will be calling the pharmacy to confirm exactly what medication she is taking. Patient cannot tell me exactly what she is taking. She states she has not taken anything for sleep besides melatonin though she does have trazodone on her list.  She is not sure if she is actually taking this or not. She wishes for Benadryl though I do not feel comfortable giving her this medication until we do get an accurate med list.  She continues to complain of pain she is currently on Mobic and states is not helping her. She does follow with pain management but states all they want to do with surgery. She was getting Tylenol 3 and Norco from a dentist.  She states she is currently out of this medication. She does have an appointment with her pulmonologist tomorrow. She is not currently on oxygen. She will be going to a nephrologist due to her blood pressure issues.   Her

## 2021-04-15 NOTE — PATIENT INSTRUCTIONS
Personalized Preventive Plan for Otoniel Martel - 4/15/2021  Medicare offers a range of preventive health benefits. Some of the tests and screenings are paid in full while other may be subject to a deductible, co-insurance, and/or copay. Some of these benefits include a comprehensive review of your medical history including lifestyle, illnesses that may run in your family, and various assessments and screenings as appropriate. After reviewing your medical record and screening and assessments performed today your provider may have ordered immunizations, labs, imaging, and/or referrals for you. A list of these orders (if applicable) as well as your Preventive Care list are included within your After Visit Summary for your review. Other Preventive Recommendations:    · A preventive eye exam performed by an eye specialist is recommended every 1-2 years to screen for glaucoma; cataracts, macular degeneration, and other eye disorders. · A preventive dental visit is recommended every 6 months. · Try to get at least 150 minutes of exercise per week or 10,000 steps per day on a pedometer . · Order or download the FREE \"Exercise & Physical Activity: Your Everyday Guide\" from [employer]. Call 5-763.190.4872 or search [employer] online. · You need 1014-1424 mg of calcium and 7196-1680 IU of vitamin D per day. It is possible to meet your calcium requirement with diet alone, but a vitamin D supplement is usually necessary to meet this goal.  · When exposed to the sun, use a sunscreen that protects against both UVA and UVB radiation with an SPF of 30 or greater. Reapply every 2 to 3 hours or after sweating, drying off with a towel, or swimming. · Always wear a seat belt when traveling in a car. Always wear a helmet when riding a bicycle or motorcycle.

## 2021-04-19 ENCOUNTER — APPOINTMENT (OUTPATIENT)
Dept: GENERAL RADIOLOGY | Age: 66
DRG: 177 | End: 2021-04-19
Payer: MEDICARE

## 2021-04-19 ENCOUNTER — NURSE TRIAGE (OUTPATIENT)
Dept: OTHER | Facility: CLINIC | Age: 66
End: 2021-04-19

## 2021-04-19 ENCOUNTER — HOSPITAL ENCOUNTER (INPATIENT)
Age: 66
LOS: 4 days | Discharge: HOME | DRG: 177 | End: 2021-04-24
Attending: EMERGENCY MEDICINE | Admitting: INTERNAL MEDICINE
Payer: MEDICARE

## 2021-04-19 ENCOUNTER — TELEPHONE (OUTPATIENT)
Dept: CARDIOLOGY CLINIC | Age: 66
End: 2021-04-19

## 2021-04-19 ENCOUNTER — TELEPHONE (OUTPATIENT)
Dept: FAMILY MEDICINE CLINIC | Age: 66
End: 2021-04-19

## 2021-04-19 ENCOUNTER — APPOINTMENT (OUTPATIENT)
Dept: CT IMAGING | Age: 66
DRG: 177 | End: 2021-04-19
Payer: MEDICARE

## 2021-04-19 DIAGNOSIS — I10 ELEVATED BLOOD PRESSURE READING WITH DIAGNOSIS OF HYPERTENSION: ICD-10-CM

## 2021-04-19 DIAGNOSIS — J06.9 ACUTE RESPIRATORY DISEASE DUE TO 2019 NOVEL CORONAVIRUS: Primary | ICD-10-CM

## 2021-04-19 DIAGNOSIS — U07.1 ACUTE RESPIRATORY DISEASE DUE TO 2019 NOVEL CORONAVIRUS: Primary | ICD-10-CM

## 2021-04-19 LAB
ALBUMIN SERPL-MCNC: 3.5 G/DL (ref 3.5–5.2)
ALP BLD-CCNC: 69 U/L (ref 35–104)
ALT SERPL-CCNC: 18 U/L (ref 0–32)
ANION GAP SERPL CALCULATED.3IONS-SCNC: 8 MMOL/L (ref 7–16)
APTT: 26.9 SEC (ref 24.5–35.1)
AST SERPL-CCNC: 17 U/L (ref 0–31)
BACTERIA: ABNORMAL /HPF
BASOPHILS ABSOLUTE: 0 E9/L (ref 0–0.2)
BASOPHILS RELATIVE PERCENT: 0 % (ref 0–2)
BILIRUB SERPL-MCNC: 0.4 MG/DL (ref 0–1.2)
BILIRUBIN URINE: NEGATIVE
BLOOD, URINE: NEGATIVE
BUN BLDV-MCNC: 24 MG/DL (ref 8–23)
CALCIUM SERPL-MCNC: 9 MG/DL (ref 8.6–10.2)
CHLORIDE BLD-SCNC: 102 MMOL/L (ref 98–107)
CLARITY: CLEAR
CO2: 28 MMOL/L (ref 22–29)
COLOR: YELLOW
CREAT SERPL-MCNC: 1 MG/DL (ref 0.5–1)
D DIMER: 512 NG/ML DDU
EKG ATRIAL RATE: 79 BPM
EKG P AXIS: 66 DEGREES
EKG P-R INTERVAL: 154 MS
EKG Q-T INTERVAL: 464 MS
EKG QRS DURATION: 144 MS
EKG QTC CALCULATION (BAZETT): 532 MS
EKG R AXIS: 61 DEGREES
EKG T AXIS: 50 DEGREES
EKG VENTRICULAR RATE: 79 BPM
EOSINOPHILS ABSOLUTE: 0 E9/L (ref 0.05–0.5)
EOSINOPHILS RELATIVE PERCENT: 0 % (ref 0–6)
EPITHELIAL CELLS, UA: ABNORMAL /HPF
GFR AFRICAN AMERICAN: >60
GFR NON-AFRICAN AMERICAN: >60 ML/MIN/1.73
GLUCOSE BLD-MCNC: 221 MG/DL (ref 74–99)
GLUCOSE URINE: NEGATIVE MG/DL
HCT VFR BLD CALC: 41.5 % (ref 34–48)
HEMOGLOBIN: 13.3 G/DL (ref 11.5–15.5)
IMMATURE GRANULOCYTES #: 0.01 E9/L
IMMATURE GRANULOCYTES %: 0.2 % (ref 0–5)
INR BLD: 1.1
KETONES, URINE: NEGATIVE MG/DL
LEUKOCYTE ESTERASE, URINE: NEGATIVE
LYMPHOCYTES ABSOLUTE: 0.3 E9/L (ref 1.5–4)
LYMPHOCYTES RELATIVE PERCENT: 6.6 % (ref 20–42)
MCH RBC QN AUTO: 31 PG (ref 26–35)
MCHC RBC AUTO-ENTMCNC: 32 % (ref 32–34.5)
MCV RBC AUTO: 96.7 FL (ref 80–99.9)
MONOCYTES ABSOLUTE: 0.14 E9/L (ref 0.1–0.95)
MONOCYTES RELATIVE PERCENT: 3.1 % (ref 2–12)
NEUTROPHILS ABSOLUTE: 4.13 E9/L (ref 1.8–7.3)
NEUTROPHILS RELATIVE PERCENT: 90.1 % (ref 43–80)
NITRITE, URINE: POSITIVE
PDW BLD-RTO: 12.6 FL (ref 11.5–15)
PH UA: 6 (ref 5–9)
PLATELET # BLD: 196 E9/L (ref 130–450)
PMV BLD AUTO: 10.7 FL (ref 7–12)
POTASSIUM SERPL-SCNC: 5.8 MMOL/L (ref 3.5–5)
PRO-BNP: 1179 PG/ML (ref 0–125)
PROTEIN UA: 30 MG/DL
PROTHROMBIN TIME: 12.6 SEC (ref 9.3–12.4)
RBC # BLD: 4.29 E12/L (ref 3.5–5.5)
RBC UA: ABNORMAL /HPF (ref 0–2)
SARS-COV-2, NAAT: DETECTED
SODIUM BLD-SCNC: 138 MMOL/L (ref 132–146)
SPECIFIC GRAVITY UA: 1.02 (ref 1–1.03)
TOTAL PROTEIN: 7.3 G/DL (ref 6.4–8.3)
TROPONIN: <0.01 NG/ML (ref 0–0.03)
UROBILINOGEN, URINE: 1 E.U./DL
VACUOLATED NEUTROPHILS: ABNORMAL
WBC # BLD: 4.6 E9/L (ref 4.5–11.5)
WBC UA: ABNORMAL /HPF (ref 0–5)

## 2021-04-19 PROCEDURE — 86140 C-REACTIVE PROTEIN: CPT

## 2021-04-19 PROCEDURE — 36415 COLL VENOUS BLD VENIPUNCTURE: CPT

## 2021-04-19 PROCEDURE — 84484 ASSAY OF TROPONIN QUANT: CPT

## 2021-04-19 PROCEDURE — 6360000004 HC RX CONTRAST MEDICATION: Performed by: RADIOLOGY

## 2021-04-19 PROCEDURE — 85730 THROMBOPLASTIN TIME PARTIAL: CPT

## 2021-04-19 PROCEDURE — 81001 URINALYSIS AUTO W/SCOPE: CPT

## 2021-04-19 PROCEDURE — 80053 COMPREHEN METABOLIC PANEL: CPT

## 2021-04-19 PROCEDURE — 99285 EMERGENCY DEPT VISIT HI MDM: CPT

## 2021-04-19 PROCEDURE — 85378 FIBRIN DEGRADE SEMIQUANT: CPT

## 2021-04-19 PROCEDURE — 87635 SARS-COV-2 COVID-19 AMP PRB: CPT

## 2021-04-19 PROCEDURE — 85610 PROTHROMBIN TIME: CPT

## 2021-04-19 PROCEDURE — 93005 ELECTROCARDIOGRAM TRACING: CPT | Performed by: PHYSICIAN ASSISTANT

## 2021-04-19 PROCEDURE — 71045 X-RAY EXAM CHEST 1 VIEW: CPT

## 2021-04-19 PROCEDURE — 85025 COMPLETE CBC W/AUTO DIFF WBC: CPT

## 2021-04-19 PROCEDURE — 71275 CT ANGIOGRAPHY CHEST: CPT

## 2021-04-19 PROCEDURE — 96374 THER/PROPH/DIAG INJ IV PUSH: CPT

## 2021-04-19 PROCEDURE — 6360000002 HC RX W HCPCS: Performed by: PHYSICIAN ASSISTANT

## 2021-04-19 PROCEDURE — 6370000000 HC RX 637 (ALT 250 FOR IP): Performed by: PHYSICIAN ASSISTANT

## 2021-04-19 PROCEDURE — 83880 ASSAY OF NATRIURETIC PEPTIDE: CPT

## 2021-04-19 RX ORDER — DIPHENHYDRAMINE HCL 25 MG
50 TABLET ORAL ONCE
Status: COMPLETED | OUTPATIENT
Start: 2021-04-19 | End: 2021-04-19

## 2021-04-19 RX ORDER — METHYLPREDNISOLONE SODIUM SUCCINATE 125 MG/2ML
125 INJECTION, POWDER, LYOPHILIZED, FOR SOLUTION INTRAMUSCULAR; INTRAVENOUS ONCE
Status: COMPLETED | OUTPATIENT
Start: 2021-04-19 | End: 2021-04-19

## 2021-04-19 RX ORDER — HYDRALAZINE HYDROCHLORIDE 50 MG/1
50 TABLET, FILM COATED ORAL ONCE
Status: COMPLETED | OUTPATIENT
Start: 2021-04-19 | End: 2021-04-19

## 2021-04-19 RX ORDER — PREDNISONE 20 MG/1
50 TABLET ORAL EVERY 6 HOURS
Status: DISCONTINUED | OUTPATIENT
Start: 2021-04-19 | End: 2021-04-19

## 2021-04-19 RX ADMIN — DIPHENHYDRAMINE HCL 50 MG: 25 TABLET ORAL at 22:25

## 2021-04-19 RX ADMIN — IOPAMIDOL 75 ML: 755 INJECTION, SOLUTION INTRAVENOUS at 23:40

## 2021-04-19 RX ADMIN — HYDRALAZINE HYDROCHLORIDE 50 MG: 50 TABLET, FILM COATED ORAL at 23:18

## 2021-04-19 RX ADMIN — METHYLPREDNISOLONE SODIUM SUCCINATE 125 MG: 125 INJECTION, POWDER, FOR SOLUTION INTRAMUSCULAR; INTRAVENOUS at 22:25

## 2021-04-19 ASSESSMENT — PAIN SCALES - GENERAL: PAINLEVEL_OUTOF10: 0

## 2021-04-19 ASSESSMENT — PAIN DESCRIPTION - FREQUENCY: FREQUENCY: CONTINUOUS

## 2021-04-19 ASSESSMENT — PAIN DESCRIPTION - PAIN TYPE: TYPE: ACUTE PAIN

## 2021-04-19 ASSESSMENT — PAIN DESCRIPTION - DESCRIPTORS
DESCRIPTORS: ACHING;CONSTANT;DISCOMFORT
DESCRIPTORS: CONSTANT

## 2021-04-19 ASSESSMENT — PAIN DESCRIPTION - PROGRESSION: CLINICAL_PROGRESSION: GRADUALLY WORSENING

## 2021-04-19 NOTE — TELEPHONE ENCOUNTER
Patient needs to be seen today for coughing up blood. She will go to walk in care since Dr Madeline Bruce is not available. gave her hours and address.

## 2021-04-19 NOTE — TELEPHONE ENCOUNTER
Received call f at pre-service center [facility]) [place] with [place] Complaint. Brief description of triage: coughing up blood     Triage indicates for patient to to be seen in 4 hours or go to ED or [facility]. today she advised she is going to get her covid vaccine today and doesn't know if she can come into the office she can go to ED or C later today     Care advice provided, patient verbalizes understanding; denies any other questions or concerns; instructed to call back for any new or worsening symptoms. Writer provided warm transfer to BOD at [facility] for appointment scheduling. Attention Provider: Thank you for allowing me to participate in the care of your patient. The patient was connected to triage in response to information provided to the ECC. Please do not respond through this encounter as the response is not directed to a shared pool. Reason for Disposition   [1] Coughed up blood AND [2] > 1 tablespoon (15 ml) (Exception: blood-tinged sputum)    Answer Assessment - Initial Assessment Questions  1. ONSET: \"When did you start coughing up blood? \"      Friday     2. SEVERITY: \"How many times? \" \"How much blood? \" (e.g., flecks, streaks, tablespoons, etc)      Thursday there was streaks , Saturday and Sunday and today there is teaspoon of blood a little less than this     3. COUGHING SPASMS: \"Did the blood appear after a coughing spell? \"        Red and a little mucus with a darker sputum   Every time she coughs it is noted     4. RESPIRATORY DISTRESS: \"Describe your breathing. \"       Asthma states, \" a little respiratory distress dr. Adelso Slade put her on prednisone\" put on it Friday  is aware of the blood     5. FEVER: \"Do you have a fever? \" If so, ask: \"What is your temperature, how was it measured, and when did it start? \"      No     6. SPUTUM: \"Describe the color of your sputum\" (clear, white, yellow, green), \"Has there been any change recently? \"      Clear sputum with some dark blood  It is getting worse     7. CARDIAC HISTORY: \"Do you have any history of heart disease? \" (e.g., heart attack, congestive heart failure)       Open heart surgery 2011,     8. LUNG HISTORY: \"Do you have any history of lung disease? \"  (e.g., pulmonary embolus, asthma, emphysema)      Asthma only     9. PE RISK FACTORS: \"Do you have a history of blood clots? \" (or: recent major surgery, recent prolonged travel, bedridden)      No     10. OTHER SYMPTOMS: \"Do you have any other symptoms? \" (e.g., nosebleed, chest pain, abdominal pain, vomiting)        Nauseated and had a nose bleed last month she woke up to blood on face and pillow she thinks it was due to her oxygen machine , abdominal pain from her hernia monitored by  , when she swallows her ears are tender and pain     11. PREGNANCY: \"Is there any chance you are pregnant? \" \"When was your last menstrual period? \"        N/A     12. TRAVEL: \"Have you traveled out of the country in the last month? \" (e.g., travel history, exposures)        N/A    Protocols used: COUGHING UP BLOOD-ADULT-

## 2021-04-19 NOTE — TELEPHONE ENCOUNTER
Patient called and left a message stating she is still having trouble regulating her BP. Upon review of chart, she called Dr. Melissa Lopez office this morning with complaints of coughing up blood. She was advised to proceed to the ER. I spoke with patient. Advised she should proceed to the ER for evaluation as recommended and they can also address BP at that time. She states she understands.

## 2021-04-20 ENCOUNTER — APPOINTMENT (OUTPATIENT)
Dept: CT IMAGING | Age: 66
DRG: 177 | End: 2021-04-20
Payer: MEDICARE

## 2021-04-20 ENCOUNTER — TELEPHONE (OUTPATIENT)
Dept: FAMILY MEDICINE CLINIC | Age: 66
End: 2021-04-20

## 2021-04-20 PROBLEM — U07.1 ACUTE RESPIRATORY DISEASE DUE TO 2019 NOVEL CORONAVIRUS: Status: ACTIVE | Noted: 2021-04-20

## 2021-04-20 PROBLEM — J06.9 ACUTE RESPIRATORY DISEASE DUE TO 2019 NOVEL CORONAVIRUS: Status: ACTIVE | Noted: 2021-04-20

## 2021-04-20 LAB
ANION GAP SERPL CALCULATED.3IONS-SCNC: 10 MMOL/L (ref 7–16)
BASOPHILS ABSOLUTE: 0 E9/L (ref 0–0.2)
BASOPHILS RELATIVE PERCENT: 0 % (ref 0–2)
BUN BLDV-MCNC: 21 MG/DL (ref 8–23)
C-REACTIVE PROTEIN: 2.7 MG/DL (ref 0–0.4)
CALCIUM SERPL-MCNC: 8.4 MG/DL (ref 8.6–10.2)
CHLORIDE BLD-SCNC: 100 MMOL/L (ref 98–107)
CO2: 25 MMOL/L (ref 22–29)
CREAT SERPL-MCNC: 0.9 MG/DL (ref 0.5–1)
EKG ATRIAL RATE: 79 BPM
EKG P AXIS: 66 DEGREES
EKG P-R INTERVAL: 154 MS
EKG Q-T INTERVAL: 464 MS
EKG QRS DURATION: 144 MS
EKG QTC CALCULATION (BAZETT): 532 MS
EKG R AXIS: 61 DEGREES
EKG T AXIS: 50 DEGREES
EKG VENTRICULAR RATE: 79 BPM
EOSINOPHILS ABSOLUTE: 0 E9/L (ref 0.05–0.5)
EOSINOPHILS RELATIVE PERCENT: 0 % (ref 0–6)
GFR AFRICAN AMERICAN: >60
GFR NON-AFRICAN AMERICAN: >60 ML/MIN/1.73
GLUCOSE BLD-MCNC: 252 MG/DL (ref 74–99)
HBA1C MFR BLD: 6.5 % (ref 4–5.6)
HCT VFR BLD CALC: 40.1 % (ref 34–48)
HEMOGLOBIN: 12.7 G/DL (ref 11.5–15.5)
LYMPHOCYTES ABSOLUTE: 0.09 E9/L (ref 1.5–4)
LYMPHOCYTES RELATIVE PERCENT: 1.7 % (ref 20–42)
MCH RBC QN AUTO: 31.1 PG (ref 26–35)
MCHC RBC AUTO-ENTMCNC: 31.7 % (ref 32–34.5)
MCV RBC AUTO: 98 FL (ref 80–99.9)
METER GLUCOSE: 133 MG/DL (ref 74–99)
METER GLUCOSE: 138 MG/DL (ref 74–99)
METER GLUCOSE: 187 MG/DL (ref 74–99)
METER GLUCOSE: 197 MG/DL (ref 74–99)
METER GLUCOSE: 214 MG/DL (ref 74–99)
METER GLUCOSE: 286 MG/DL (ref 74–99)
MONOCYTES ABSOLUTE: 0.04 E9/L (ref 0.1–0.95)
MONOCYTES RELATIVE PERCENT: 0.9 % (ref 2–12)
NEUTROPHILS ABSOLUTE: 4.17 E9/L (ref 1.8–7.3)
NEUTROPHILS RELATIVE PERCENT: 97.4 % (ref 43–80)
NUCLEATED RED BLOOD CELLS: 0 /100 WBC
PDW BLD-RTO: 12.6 FL (ref 11.5–15)
PLATELET # BLD: 176 E9/L (ref 130–450)
PMV BLD AUTO: 10.5 FL (ref 7–12)
POTASSIUM REFLEX MAGNESIUM: 4.3 MMOL/L (ref 3.5–5)
RBC # BLD: 4.09 E12/L (ref 3.5–5.5)
SODIUM BLD-SCNC: 135 MMOL/L (ref 132–146)
WBC # BLD: 4.3 E9/L (ref 4.5–11.5)

## 2021-04-20 PROCEDURE — 85025 COMPLETE CBC W/AUTO DIFF WBC: CPT

## 2021-04-20 PROCEDURE — 2500000003 HC RX 250 WO HCPCS: Performed by: INTERNAL MEDICINE

## 2021-04-20 PROCEDURE — 99233 SBSQ HOSP IP/OBS HIGH 50: CPT | Performed by: INTERNAL MEDICINE

## 2021-04-20 PROCEDURE — 36415 COLL VENOUS BLD VENIPUNCTURE: CPT

## 2021-04-20 PROCEDURE — 2060000000 HC ICU INTERMEDIATE R&B

## 2021-04-20 PROCEDURE — 82962 GLUCOSE BLOOD TEST: CPT

## 2021-04-20 PROCEDURE — 87449 NOS EACH ORGANISM AG IA: CPT

## 2021-04-20 PROCEDURE — 6370000000 HC RX 637 (ALT 250 FOR IP): Performed by: INTERNAL MEDICINE

## 2021-04-20 PROCEDURE — 2700000000 HC OXYGEN THERAPY PER DAY

## 2021-04-20 PROCEDURE — 6360000002 HC RX W HCPCS: Performed by: INTERNAL MEDICINE

## 2021-04-20 PROCEDURE — 94660 CPAP INITIATION&MGMT: CPT

## 2021-04-20 PROCEDURE — 99223 1ST HOSP IP/OBS HIGH 75: CPT | Performed by: INTERNAL MEDICINE

## 2021-04-20 PROCEDURE — 83036 HEMOGLOBIN GLYCOSYLATED A1C: CPT

## 2021-04-20 PROCEDURE — 2580000003 HC RX 258: Performed by: INTERNAL MEDICINE

## 2021-04-20 PROCEDURE — 80048 BASIC METABOLIC PNL TOTAL CA: CPT

## 2021-04-20 PROCEDURE — 6370000000 HC RX 637 (ALT 250 FOR IP): Performed by: PHYSICIAN ASSISTANT

## 2021-04-20 PROCEDURE — XW033E5 INTRODUCTION OF REMDESIVIR ANTI-INFECTIVE INTO PERIPHERAL VEIN, PERCUTANEOUS APPROACH, NEW TECHNOLOGY GROUP 5: ICD-10-PCS | Performed by: INTERNAL MEDICINE

## 2021-04-20 RX ORDER — DULOXETIN HYDROCHLORIDE 60 MG/1
60 CAPSULE, DELAYED RELEASE ORAL 2 TIMES DAILY
Status: DISCONTINUED | OUTPATIENT
Start: 2021-04-20 | End: 2021-04-24 | Stop reason: HOSPADM

## 2021-04-20 RX ORDER — FAMOTIDINE 20 MG/1
20 TABLET, FILM COATED ORAL 2 TIMES DAILY
Status: DISCONTINUED | OUTPATIENT
Start: 2021-04-20 | End: 2021-04-20

## 2021-04-20 RX ORDER — NICOTINE POLACRILEX 4 MG
15 LOZENGE BUCCAL PRN
Status: DISCONTINUED | OUTPATIENT
Start: 2021-04-20 | End: 2021-04-24 | Stop reason: HOSPADM

## 2021-04-20 RX ORDER — SODIUM CHLORIDE 0.9 % (FLUSH) 0.9 %
10 SYRINGE (ML) INJECTION EVERY 12 HOURS SCHEDULED
Status: DISCONTINUED | OUTPATIENT
Start: 2021-04-20 | End: 2021-04-24 | Stop reason: HOSPADM

## 2021-04-20 RX ORDER — PANTOPRAZOLE SODIUM 40 MG/1
40 TABLET, DELAYED RELEASE ORAL
Status: DISCONTINUED | OUTPATIENT
Start: 2021-04-20 | End: 2021-04-24 | Stop reason: HOSPADM

## 2021-04-20 RX ORDER — HYDRALAZINE HYDROCHLORIDE 20 MG/ML
10 INJECTION INTRAMUSCULAR; INTRAVENOUS EVERY 4 HOURS PRN
Status: DISCONTINUED | OUTPATIENT
Start: 2021-04-20 | End: 2021-04-24 | Stop reason: HOSPADM

## 2021-04-20 RX ORDER — LIDOCAINE 50 MG/G
1 OINTMENT TOPICAL PRN
COMMUNITY
End: 2021-05-05 | Stop reason: ALTCHOICE

## 2021-04-20 RX ORDER — ZINC SULFATE 50(220)MG
50 CAPSULE ORAL 2 TIMES DAILY
Status: DISCONTINUED | OUTPATIENT
Start: 2021-04-20 | End: 2021-04-24 | Stop reason: HOSPADM

## 2021-04-20 RX ORDER — CARVEDILOL 25 MG/1
25 TABLET ORAL ONCE
Status: COMPLETED | OUTPATIENT
Start: 2021-04-20 | End: 2021-04-20

## 2021-04-20 RX ORDER — DEXTROSE MONOHYDRATE 50 MG/ML
100 INJECTION, SOLUTION INTRAVENOUS PRN
Status: DISCONTINUED | OUTPATIENT
Start: 2021-04-20 | End: 2021-04-24 | Stop reason: HOSPADM

## 2021-04-20 RX ORDER — ACETAMINOPHEN 650 MG/1
650 SUPPOSITORY RECTAL EVERY 6 HOURS PRN
Status: DISCONTINUED | OUTPATIENT
Start: 2021-04-20 | End: 2021-04-24 | Stop reason: HOSPADM

## 2021-04-20 RX ORDER — INSULIN GLARGINE 100 [IU]/ML
50 INJECTION, SOLUTION SUBCUTANEOUS NIGHTLY
Status: DISCONTINUED | OUTPATIENT
Start: 2021-04-20 | End: 2021-04-24 | Stop reason: HOSPADM

## 2021-04-20 RX ORDER — DEXAMETHASONE SODIUM PHOSPHATE 10 MG/ML
6 INJECTION, SOLUTION INTRAMUSCULAR; INTRAVENOUS EVERY 12 HOURS
Status: DISCONTINUED | OUTPATIENT
Start: 2021-04-20 | End: 2021-04-20 | Stop reason: CLARIF

## 2021-04-20 RX ORDER — FAMOTIDINE 20 MG/1
40 TABLET, FILM COATED ORAL 2 TIMES DAILY
Status: DISCONTINUED | OUTPATIENT
Start: 2021-04-20 | End: 2021-04-20 | Stop reason: CLARIF

## 2021-04-20 RX ORDER — DEXTROSE MONOHYDRATE 25 G/50ML
12.5 INJECTION, SOLUTION INTRAVENOUS PRN
Status: DISCONTINUED | OUTPATIENT
Start: 2021-04-20 | End: 2021-04-24 | Stop reason: HOSPADM

## 2021-04-20 RX ORDER — BACLOFEN 10 MG/1
10 TABLET ORAL 2 TIMES DAILY
Status: DISCONTINUED | OUTPATIENT
Start: 2021-04-20 | End: 2021-04-24 | Stop reason: HOSPADM

## 2021-04-20 RX ORDER — ISOSORBIDE MONONITRATE 30 MG/1
30 TABLET, EXTENDED RELEASE ORAL DAILY
Status: DISCONTINUED | OUTPATIENT
Start: 2021-04-20 | End: 2021-04-24 | Stop reason: HOSPADM

## 2021-04-20 RX ORDER — ACETAMINOPHEN 325 MG/1
650 TABLET ORAL ONCE
Status: COMPLETED | OUTPATIENT
Start: 2021-04-20 | End: 2021-04-20

## 2021-04-20 RX ORDER — CARVEDILOL 6.25 MG/1
12.5 TABLET ORAL 2 TIMES DAILY WITH MEALS
Status: DISCONTINUED | OUTPATIENT
Start: 2021-04-20 | End: 2021-04-24 | Stop reason: HOSPADM

## 2021-04-20 RX ORDER — ASCORBIC ACID 500 MG
1000 TABLET ORAL 2 TIMES DAILY
Status: DISCONTINUED | OUTPATIENT
Start: 2021-04-20 | End: 2021-04-24 | Stop reason: HOSPADM

## 2021-04-20 RX ORDER — DILTIAZEM HYDROCHLORIDE 120 MG/1
120 CAPSULE, COATED, EXTENDED RELEASE ORAL ONCE
Status: COMPLETED | OUTPATIENT
Start: 2021-04-20 | End: 2021-04-20

## 2021-04-20 RX ORDER — ATORVASTATIN CALCIUM 10 MG/1
10 TABLET, FILM COATED ORAL DAILY
Refills: 3 | Status: DISCONTINUED | OUTPATIENT
Start: 2021-04-20 | End: 2021-04-24 | Stop reason: HOSPADM

## 2021-04-20 RX ORDER — CARVEDILOL 25 MG/1
25 TABLET ORAL 2 TIMES DAILY WITH MEALS
Status: DISCONTINUED | OUTPATIENT
Start: 2021-04-20 | End: 2021-04-20

## 2021-04-20 RX ORDER — HYDROCODONE BITARTRATE AND ACETAMINOPHEN 5; 325 MG/1; MG/1
1 TABLET ORAL EVERY 6 HOURS PRN
Status: DISCONTINUED | OUTPATIENT
Start: 2021-04-20 | End: 2021-04-24 | Stop reason: HOSPADM

## 2021-04-20 RX ORDER — HYDRALAZINE HYDROCHLORIDE 50 MG/1
50 TABLET, FILM COATED ORAL EVERY 8 HOURS SCHEDULED
Status: DISCONTINUED | OUTPATIENT
Start: 2021-04-20 | End: 2021-04-24 | Stop reason: HOSPADM

## 2021-04-20 RX ORDER — SODIUM CHLORIDE 0.9 % (FLUSH) 0.9 %
10 SYRINGE (ML) INJECTION PRN
Status: DISCONTINUED | OUTPATIENT
Start: 2021-04-20 | End: 2021-04-24 | Stop reason: HOSPADM

## 2021-04-20 RX ORDER — POLYETHYLENE GLYCOL 3350 17 G/17G
17 POWDER, FOR SOLUTION ORAL DAILY PRN
Status: DISCONTINUED | OUTPATIENT
Start: 2021-04-20 | End: 2021-04-24 | Stop reason: HOSPADM

## 2021-04-20 RX ORDER — VITAMIN B COMPLEX
1000 TABLET ORAL DAILY
Status: DISCONTINUED | OUTPATIENT
Start: 2021-04-20 | End: 2021-04-24 | Stop reason: HOSPADM

## 2021-04-20 RX ORDER — ACETAMINOPHEN 325 MG/1
650 TABLET ORAL EVERY 6 HOURS PRN
Status: DISCONTINUED | OUTPATIENT
Start: 2021-04-20 | End: 2021-04-24 | Stop reason: HOSPADM

## 2021-04-20 RX ORDER — ASPIRIN 81 MG/1
81 TABLET, CHEWABLE ORAL DAILY
Status: DISCONTINUED | OUTPATIENT
Start: 2021-04-20 | End: 2021-04-24 | Stop reason: HOSPADM

## 2021-04-20 RX ORDER — SODIUM CHLORIDE 9 MG/ML
25 INJECTION, SOLUTION INTRAVENOUS PRN
Status: DISCONTINUED | OUTPATIENT
Start: 2021-04-20 | End: 2021-04-24 | Stop reason: HOSPADM

## 2021-04-20 RX ORDER — DEXAMETHASONE SODIUM PHOSPHATE 4 MG/ML
6 INJECTION, SOLUTION INTRA-ARTICULAR; INTRALESIONAL; INTRAMUSCULAR; INTRAVENOUS; SOFT TISSUE EVERY 12 HOURS
Status: DISCONTINUED | OUTPATIENT
Start: 2021-04-20 | End: 2021-04-21

## 2021-04-20 RX ORDER — MONTELUKAST SODIUM 10 MG/1
10 TABLET ORAL NIGHTLY
Status: DISCONTINUED | OUTPATIENT
Start: 2021-04-20 | End: 2021-04-24 | Stop reason: HOSPADM

## 2021-04-20 RX ORDER — 0.9 % SODIUM CHLORIDE 0.9 %
30 INTRAVENOUS SOLUTION INTRAVENOUS PRN
Status: DISCONTINUED | OUTPATIENT
Start: 2021-04-20 | End: 2021-04-24 | Stop reason: HOSPADM

## 2021-04-20 RX ORDER — DIPHENHYDRAMINE HCL 25 MG
50 TABLET ORAL ONCE
Status: COMPLETED | OUTPATIENT
Start: 2021-04-21 | End: 2021-04-21

## 2021-04-20 RX ORDER — TRAZODONE HYDROCHLORIDE 50 MG/1
100 TABLET ORAL NIGHTLY
Status: DISCONTINUED | OUTPATIENT
Start: 2021-04-20 | End: 2021-04-24 | Stop reason: HOSPADM

## 2021-04-20 RX ORDER — TROSPIUM CHLORIDE 20 MG/1
20 TABLET, FILM COATED ORAL
Refills: 1 | Status: DISCONTINUED | OUTPATIENT
Start: 2021-04-20 | End: 2021-04-24 | Stop reason: HOSPADM

## 2021-04-20 RX ORDER — ALBUTEROL SULFATE 90 UG/1
2 AEROSOL, METERED RESPIRATORY (INHALATION) EVERY 6 HOURS PRN
Status: DISCONTINUED | OUTPATIENT
Start: 2021-04-20 | End: 2021-04-24 | Stop reason: HOSPADM

## 2021-04-20 RX ADMIN — REMDESIVIR 100 MG: 100 INJECTION, POWDER, LYOPHILIZED, FOR SOLUTION INTRAVENOUS at 20:35

## 2021-04-20 RX ADMIN — INSULIN LISPRO 8 UNITS: 100 INJECTION, SOLUTION INTRAVENOUS; SUBCUTANEOUS at 16:06

## 2021-04-20 RX ADMIN — IPRATROPIUM BROMIDE 2 PUFF: 17 AEROSOL, METERED RESPIRATORY (INHALATION) at 08:23

## 2021-04-20 RX ADMIN — ACETAMINOPHEN 650 MG: 325 TABLET ORAL at 09:46

## 2021-04-20 RX ADMIN — PREDNISONE 50 MG: 20 TABLET ORAL at 20:34

## 2021-04-20 RX ADMIN — IPRATROPIUM BROMIDE 2 PUFF: 17 AEROSOL, METERED RESPIRATORY (INHALATION) at 15:57

## 2021-04-20 RX ADMIN — DEXAMETHASONE SODIUM PHOSPHATE 6 MG: 4 INJECTION, SOLUTION INTRAMUSCULAR; INTRAVENOUS at 13:42

## 2021-04-20 RX ADMIN — SODIUM CHLORIDE, PRESERVATIVE FREE 10 ML: 5 INJECTION INTRAVENOUS at 08:24

## 2021-04-20 RX ADMIN — ZINC SULFATE 220 MG (50 MG) CAPSULE 50 MG: CAPSULE at 09:46

## 2021-04-20 RX ADMIN — ATORVASTATIN CALCIUM 10 MG: 10 TABLET, FILM COATED ORAL at 08:25

## 2021-04-20 RX ADMIN — IPRATROPIUM BROMIDE 2 PUFF: 17 AEROSOL, METERED RESPIRATORY (INHALATION) at 11:51

## 2021-04-20 RX ADMIN — INSULIN LISPRO 6 UNITS: 100 INJECTION, SOLUTION INTRAVENOUS; SUBCUTANEOUS at 08:30

## 2021-04-20 RX ADMIN — OXYCODONE HYDROCHLORIDE AND ACETAMINOPHEN 1000 MG: 500 TABLET ORAL at 08:24

## 2021-04-20 RX ADMIN — SODIUM CHLORIDE, PRESERVATIVE FREE 10 ML: 5 INJECTION INTRAVENOUS at 20:35

## 2021-04-20 RX ADMIN — BACLOFEN 10 MG: 10 TABLET ORAL at 02:38

## 2021-04-20 RX ADMIN — INSULIN LISPRO 1 UNITS: 100 INJECTION, SOLUTION INTRAVENOUS; SUBCUTANEOUS at 02:47

## 2021-04-20 RX ADMIN — ACETAMINOPHEN 650 MG: 325 TABLET ORAL at 01:02

## 2021-04-20 RX ADMIN — TRAZODONE HYDROCHLORIDE 100 MG: 50 TABLET ORAL at 02:38

## 2021-04-20 RX ADMIN — CARVEDILOL 25 MG: 25 TABLET, FILM COATED ORAL at 01:03

## 2021-04-20 RX ADMIN — DULOXETINE HYDROCHLORIDE 60 MG: 60 CAPSULE, DELAYED RELEASE ORAL at 08:24

## 2021-04-20 RX ADMIN — TRAZODONE HYDROCHLORIDE 100 MG: 50 TABLET ORAL at 20:34

## 2021-04-20 RX ADMIN — INSULIN GLARGINE 50 UNITS: 100 INJECTION, SOLUTION SUBCUTANEOUS at 02:47

## 2021-04-20 RX ADMIN — HYDRALAZINE HYDROCHLORIDE 10 MG: 20 INJECTION, SOLUTION INTRAMUSCULAR; INTRAVENOUS at 21:48

## 2021-04-20 RX ADMIN — HYDROCODONE BITARTRATE AND ACETAMINOPHEN 1 TABLET: 5; 325 TABLET ORAL at 18:03

## 2021-04-20 RX ADMIN — Medication 1000 UNITS: at 08:24

## 2021-04-20 RX ADMIN — ZINC SULFATE 220 MG (50 MG) CAPSULE 50 MG: CAPSULE at 20:34

## 2021-04-20 RX ADMIN — MONTELUKAST SODIUM 10 MG: 10 TABLET, FILM COATED ORAL at 20:34

## 2021-04-20 RX ADMIN — BACLOFEN 10 MG: 10 TABLET ORAL at 20:34

## 2021-04-20 RX ADMIN — CARVEDILOL 25 MG: 25 TABLET, FILM COATED ORAL at 08:25

## 2021-04-20 RX ADMIN — INSULIN GLARGINE 50 UNITS: 100 INJECTION, SOLUTION SUBCUTANEOUS at 20:42

## 2021-04-20 RX ADMIN — DULOXETINE HYDROCHLORIDE 60 MG: 60 CAPSULE, DELAYED RELEASE ORAL at 02:38

## 2021-04-20 RX ADMIN — IPRATROPIUM BROMIDE 2 PUFF: 17 AEROSOL, METERED RESPIRATORY (INHALATION) at 20:39

## 2021-04-20 RX ADMIN — HYDROCODONE BITARTRATE AND ACETAMINOPHEN 1 TABLET: 5; 325 TABLET ORAL at 11:46

## 2021-04-20 RX ADMIN — TROSPIUM CHLORIDE 20 MG: 20 TABLET, FILM COATED ORAL at 15:58

## 2021-04-20 RX ADMIN — TROSPIUM CHLORIDE 20 MG: 20 TABLET, FILM COATED ORAL at 06:00

## 2021-04-20 RX ADMIN — DILTIAZEM HYDROCHLORIDE 120 MG: 30 TABLET, FILM COATED ORAL at 08:24

## 2021-04-20 RX ADMIN — HYDRALAZINE HYDROCHLORIDE 50 MG: 50 TABLET ORAL at 20:36

## 2021-04-20 RX ADMIN — HYDRALAZINE HYDROCHLORIDE 10 MG: 20 INJECTION, SOLUTION INTRAMUSCULAR; INTRAVENOUS at 02:38

## 2021-04-20 RX ADMIN — REMDESIVIR 200 MG: 100 INJECTION, POWDER, LYOPHILIZED, FOR SOLUTION INTRAVENOUS at 02:47

## 2021-04-20 RX ADMIN — DULOXETINE HYDROCHLORIDE 60 MG: 60 CAPSULE, DELAYED RELEASE ORAL at 20:34

## 2021-04-20 RX ADMIN — FAMOTIDINE 20 MG: 20 TABLET ORAL at 08:25

## 2021-04-20 RX ADMIN — INSULIN LISPRO 8 UNITS: 100 INJECTION, SOLUTION INTRAVENOUS; SUBCUTANEOUS at 08:29

## 2021-04-20 RX ADMIN — OXYCODONE HYDROCHLORIDE AND ACETAMINOPHEN 1000 MG: 500 TABLET ORAL at 20:35

## 2021-04-20 RX ADMIN — INSULIN LISPRO 2 UNITS: 100 INJECTION, SOLUTION INTRAVENOUS; SUBCUTANEOUS at 11:47

## 2021-04-20 RX ADMIN — DILTIAZEM HYDROCHLORIDE 120 MG: 120 CAPSULE, COATED, EXTENDED RELEASE ORAL at 01:03

## 2021-04-20 RX ADMIN — ACETAMINOPHEN 650 MG: 325 TABLET ORAL at 21:46

## 2021-04-20 RX ADMIN — MONTELUKAST SODIUM 10 MG: 10 TABLET, FILM COATED ORAL at 02:38

## 2021-04-20 RX ADMIN — HYDRALAZINE HYDROCHLORIDE 50 MG: 50 TABLET ORAL at 05:59

## 2021-04-20 RX ADMIN — INSULIN LISPRO 2 UNITS: 100 INJECTION, SOLUTION INTRAVENOUS; SUBCUTANEOUS at 20:41

## 2021-04-20 RX ADMIN — BACLOFEN 10 MG: 10 TABLET ORAL at 08:25

## 2021-04-20 RX ADMIN — ISOSORBIDE MONONITRATE 30 MG: 30 TABLET, EXTENDED RELEASE ORAL at 08:25

## 2021-04-20 RX ADMIN — INSULIN LISPRO 8 UNITS: 100 INJECTION, SOLUTION INTRAVENOUS; SUBCUTANEOUS at 11:50

## 2021-04-20 RX ADMIN — PANTOPRAZOLE SODIUM 40 MG: 40 TABLET, DELAYED RELEASE ORAL at 16:18

## 2021-04-20 RX ADMIN — ALBUTEROL SULFATE 2 PUFF: 108 AEROSOL, METERED RESPIRATORY (INHALATION) at 08:31

## 2021-04-20 RX ADMIN — ASPIRIN 81 MG: 81 TABLET, CHEWABLE ORAL at 08:25

## 2021-04-20 ASSESSMENT — PAIN SCALES - GENERAL
PAINLEVEL_OUTOF10: 3
PAINLEVEL_OUTOF10: 0
PAINLEVEL_OUTOF10: 10

## 2021-04-20 ASSESSMENT — PAIN DESCRIPTION - ONSET: ONSET: ON-GOING

## 2021-04-20 ASSESSMENT — PAIN - FUNCTIONAL ASSESSMENT: PAIN_FUNCTIONAL_ASSESSMENT: PREVENTS OR INTERFERES SOME ACTIVE ACTIVITIES AND ADLS

## 2021-04-20 ASSESSMENT — PAIN DESCRIPTION - DESCRIPTORS: DESCRIPTORS: DISCOMFORT

## 2021-04-20 NOTE — CARE COORDINATION
Social Work/Discharge Planning:  Called patient and completed initial assessment. Explained Social Work role and discussed transition of care/discharge planning. COVID positive 4/19. Day 1 Remdesivir. Patient lives alone in a one story house with two steps to enter. PTA she uses a cane or rollator walker. She has a glucometer, Trilogy and oxygen through [facility]. She states her nebulizer is not working and is requesting a new nebulizer. She has a snf history at [facility]. Patient has a history with [address] [employer] and will use again at discharge. Patient states her plan is home at discharge. She has caregivers seven days a week for six hours a day. Patient PCP is Dr. Gilles Anguiano and pharmacy is [facility] on [facility]. Called Cady with [facility] and confirmed patient oxygen order is for three liters with exercise and at night. Ed Heck states patient oxygen concentrator goes up to five liters. [employer] can supply patient nebulizer but will need an order with chart notes. Referral made to naveen Franz with [employer] [employer] and patient will need a home health care order prior to discharge. Will continue to follow and assist with discharge planning.   Electronically signed by SINDHU Stark on 4/20/2021 at 12:21 PM

## 2021-04-20 NOTE — ED NOTES
Radiology Procedure Waiver   Name: Adan Mantilla  : 1955  MRN: [id number]    Date:  21    Time: 9:55 PM EDT    Benefits of immediately proceeding with Radiology exam(s) without pre-testing outweigh the risks or are not indicated as specified below and therefore the following is/are being waived:    [] Pregnancy test   [] Patients LMP on-time and regular.   [] Patient had Tubal Ligation or has other Contraception Device. [] Patient  is Menopausal or Premenarcheal.    [] Patient had Full or Partial Hysterectomy. [x] Protocol for Iodine allergy    [] MRI Questionnaire     [] BUN/Creatinine   [] Patient age w/no hx of renal dysfunction. [] Patient on Dialysis. [] Recent Normal Labs.   Electronically signed by Digna Hayden PA-C on 21 at 9:55 PM EDT               Digna Hayden PA-C  [phone]

## 2021-04-20 NOTE — ED NOTES
CHUYITA faxed to [facility]S ; receipt confirmed with Jenny Beltrán and fax confirmation.      Mo Del Cid RN  04/20/21 0133

## 2021-04-20 NOTE — ED PROVIDER NOTES
ED Attending  CC: Lorna Levinroger  Department of Emergency Medicine   ED  Encounter Note  Admit Date/RoomTime: 2021  7:47 PM  ED Room: [facility]/06-A    NAME: Tong Andrew  : 1955  MRN: [id number]     Chief Complaint:  Shortness of Breath (Sent by PCP/ Pulm - Pt states coughing up blood.) and Chest Pain (Right sided)    History of Present Illness        Tong Andrew is a 76 y.o. old female who presents to the emergency department by private vehicle, with gradual onset, worsening chest tightness and productive cough with sputum described as blood streaked which began a few day(s) prior to arrival.  The symptoms are associated with productive cough and sore throat, chest pain right upper and denies abdominal pain, dizziness, fatigue, leg swelling, palpitations or slow heart beat. She has prior history of asthma and COPD in the past.  Since onset the symptoms have been gradually worsening and moderate in severity. The symptoms are aggravated by exertion and relieved by nothing and pt reports she pulmonolgist friday and he started on prednisone. . Pt wears oxygenated bipap for sleep, does not use oxygen regularly at home. Pt continues to smoke. Pt reports she has been having high blood pressure readings despite multiple medications and her doctor is referring her to NEphrology, no history of kidney disease. Pt follows with Dr. Ocasio Dear for pulmonology. ROS   Pertinent positives and negatives are stated within HPI, all other systems reviewed and are negative.     Past Medical History:  has a past medical history of Asthma, Atherosclerosis of native artery of left leg with rest pain ([address] C. difficile diarrhea, CAD (coronary artery disease), Cellulitis and abscess of trunk, Cerebellar infarct (HCC), Chronic back pain, Chronic kidney disease, Chronic systolic congestive heart failure ([address] Chronic venous insufficiency, COPD (chronic obstructive pulmonary disease) ([address] Depression, Diabetes mellitus ([address] Diabetic neuropathy ([address] Diverticulosis, Fatty liver, Glaucoma, open angle, Hepatic encephalopathy ([address] Hiatal hernia, History of blood transfusion, Hyperlipidemia, Hyperplastic colon polyp, Hypertension, Incontinence, Liver mass, Morbid obesity ([address] Movement disorder, Myocardial infarction ([address] Osteoarthritis, generalized, Pain in both lower legs, Peripheral vascular disease ([address] Pneumonia, Pulmonary edema, PVD (peripheral vascular disease) with claudication ([address] Sleep apnea, Status post peripheral artery angioplasty, Tobacco abuse, Tobacco abuse, Tubular adenoma polyp of rectum, Urinary tract infection due to ESBL Klebsiella, and Ventral hernia. Surgical History:  has a past surgical history that includes knee surgery; Upper gastrointestinal endoscopy (12/20/12); Coronary artery bypass graft; layer wound closure (Left, [id number]); Echo Complete (10/9/2013); polysomnography (1/2016); liver biopsy (1/8/2016); bronchial brush biopsy (1/25/2013); Breast surgery (2000); Cholecystectomy (YRS AGO); Cardiac surgery (12/2011); Cardiac catheterization (04/20/2015); Hysterectomy; joint replacement (2011); Upper gastrointestinal endoscopy (12/23/2016); Colonoscopy (11/15/2013); Colonoscopy (12/23/2016); Upper gastrointestinal endoscopy (02/08/2017); Endoscopy, colon, diagnostic (04/18/2017); Gallbladder surgery; and angioplasty (11/13/2018). Social History:  reports that she has been smoking cigarettes. She started smoking about 46 years ago. She has a 10.00 pack-year smoking history. She has never used smokeless tobacco. She reports current alcohol use. She reports current drug use. Drug: Marijuana. Family History: family history includes Asthma in her father; Cancer in her mother.      Allergies: Latex, Bee venom, Dilaudid [hydromorphone hcl], Dye [iodides], Percocet [oxycodone-acetaminophen], Keflex [cephalexin], Lasix [furosemide], Levaquin [levofloxacin in d5w], Lipitor, Lyrica [pregabalin], Morphine, Naproxen, Nefazodone, Norvasc [amlodipine], Oxycodone-acetaminophen, Shellfish-derived products, and Trazodone and nefazodone    Physical Exam   Oxygen Saturation Interpretation: unsure of baseline, oxygen on room air abnormal.        ED Triage Vitals [04/19/21 1842]   BP Temp Temp src Pulse Resp SpO2 Height Weight   (!) 179/86 98.2 °F (36.8 °C) -- 82 16 93 % 5' 2\" (1.575 m) 221 lb (100.2 kg)         Constitutional:  Alert, development consistent with age. Well appearing  HEENT:  NC/NT. Airway patent. No posterior erythema, oral mucosa moist  Neck:  Normal ROM. Supple. Tender, not swollen anterior chain lymph nodes. Possible left side thyroid nodule. Respiratory:  Breath sounds: reduced bilaterally. Lung sounds: diminished breath sounds- throughout, rhonchi- lower left and wheezing- scattered. Mild conversational dyspnea. Bronchial crackling audible when pt breaths. CV:  Regular rate and rhythm, normal heart sounds, without pathological murmurs, ectopy, gallops, or rubs. .  GI:  Abdomen Soft, nontender, good bowel sounds. No firm or pulsatile mass. Obese. Integument:  Normal turgor. Warm, dry, without visible rash. Lymphatic:  Edema:  none Bilateral lower extremity(s). Neurological:  Oriented. Motor functions intact.     Lab / Imaging Results   (All laboratory and radiology results have been personally reviewed by myself)  Labs:  Results for orders placed or performed during the hospital encounter of 04/19/21   COVID-19, Rapid    Specimen: Nasopharyngeal Swab   Result Value Ref Range    SARS-CoV-2, NAAT DETECTED (A) Not Detected   CBC Auto Differential   Result Value Ref Range    WBC 4.6 4.5 - 11.5 E9/L    RBC 4.29 3.50 - 5.50 E12/L    Hemoglobin 13.3 11.5 - 15.5 g/dL    Hematocrit 41.5 34.0 - 48.0 %    MCV 96.7 80.0 - 99.9 fL    MCH 31.0 26.0 - 35.0 pg    MCHC 32.0 32.0 - 34.5 %    RDW 12.6 11.5 - 15.0 fL    Platelets 323 706 - 699 E9/L    MPV 10.7 7.0 - 12.0 fL    Neutrophils % 90.1 (H) 43.0 - 80.0 %    Immature Granulocytes % 0.2 0.0 - 5.0 %    Lymphocytes % 6.6 (L) 20.0 - 42.0 %    Monocytes % 3.1 2.0 - 12.0 %    Eosinophils % 0.0 0.0 - 6.0 %    Basophils % 0.0 0.0 - 2.0 %    Neutrophils Absolute 4.13 1.80 - 7.30 E9/L    Immature Granulocytes # 0.01 E9/L    Lymphocytes Absolute 0.30 (L) 1.50 - 4.00 E9/L    Monocytes Absolute 0.14 0.10 - 0.95 E9/L    Eosinophils Absolute 0.00 (L) 0.05 - 0.50 E9/L    Basophils Absolute 0.00 0.00 - 0.20 E9/L    Vacuolated Neutrophils 1+    Comprehensive Metabolic Panel   Result Value Ref Range    Sodium 138 132 - 146 mmol/L    Potassium 5.8 (H) 3.5 - 5.0 mmol/L    Chloride 102 98 - 107 mmol/L    CO2 28 22 - 29 mmol/L    Anion Gap 8 7 - 16 mmol/L    Glucose 221 (H) 74 - 99 mg/dL    BUN 24 (H) 8 - 23 mg/dL    CREATININE 1.0 0.5 - 1.0 mg/dL    GFR Non-African American >60 >=60 mL/min/1.73    GFR African American >60     Calcium 9.0 8.6 - 10.2 mg/dL    Total Protein 7.3 6.4 - 8.3 g/dL    Albumin 3.5 3.5 - 5.2 g/dL    Total Bilirubin 0.4 0.0 - 1.2 mg/dL    Alkaline Phosphatase 69 35 - 104 U/L    ALT 18 0 - 32 U/L    AST 17 0 - 31 U/L   Urinalysis   Result Value Ref Range    Color, UA Yellow Straw/Yellow    Clarity, UA Clear Clear    Glucose, Ur Negative Negative mg/dL    Bilirubin Urine Negative Negative    Ketones, Urine Negative Negative mg/dL    Specific Gravity, UA 1.020 1.005 - 1.030    Blood, Urine Negative Negative    pH, UA 6.0 5.0 - 9.0    Protein, UA 30 (A) Negative mg/dL    Urobilinogen, Urine 1.0 <2.0 E.U./dL    Nitrite, Urine POSITIVE (A) Negative    Leukocyte Esterase, Urine Negative Negative   Protime-INR   Result Value Ref Range    Protime 12.6 (H) 9.3 - 12.4 sec    INR 1.1    APTT   Result Value Ref Range    aPTT 26.9 24.5 - 35.1 sec   Troponin   Result Value Ref Range    Troponin <0.01 0.00 - 0.03 ng/mL   Brain Natriuretic Peptide   Result Value Ref Range    Pro-BNP 1,179 (H) 0 - 125 pg/mL   D-dimer, quantitative Result Value Ref Range    D-Dimer, Quant 512 ng/mL DDU   Microscopic Urinalysis   Result Value Ref Range    WBC, UA 0-1 0 - 5 /HPF    RBC, UA 0-1 0 - 2 /HPF    Epithelial Cells, UA FEW /HPF    Bacteria, UA MANY (A) None Seen /HPF   POCT Glucose   Result Value Ref Range    Meter Glucose 133 (H) 74 - 99 mg/dL   EKG 12 Lead   Result Value Ref Range    Ventricular Rate 79 BPM    Atrial Rate 79 BPM    P-R Interval 154 ms    QRS Duration 144 ms    Q-T Interval 464 ms    QTc Calculation (Bazett) 532 ms    P Axis 66 degrees    R Axis 61 degrees    T Axis 50 degrees   EKG 12 Lead   Result Value Ref Range    Ventricular Rate 79 BPM    Atrial Rate 79 BPM    P-R Interval 154 ms    QRS Duration 144 ms    Q-T Interval 464 ms    QTc Calculation (Bazett) 532 ms    P Axis 66 degrees    R Axis 61 degrees    T Axis 50 degrees     /EKG #1:  Interpreted by emergency department physician unless otherwise noted. Time:  1851    Rate: 79 bpm  Rhythm: Sinus rhythm, with occasional PVCs. Interpretation: Sinus rhythm , with Right Bundle Branch Block and PVC's noted. Comparison: There are no significant changes when compared to prior tracings. Imaging: All Radiology results interpreted by Radiologist unless otherwise noted. CTA PULMONARY W CONTRAST   Final Result   No evidence of pulmonary embolism. Multifocal bilateral patchy ground-glass opacities, compatible with atypical   or COVID related pneumonia. XR CHEST PORTABLE   Final Result   Possible left upper lobe opacity. Stable cardiomegaly. CT SOFT TISSUE NECK W CONTRAST    (Results Pending)       ED Course / Medical Decision Making   Pt remained stable on 3 L NC 95-96%. Blood pressure remaining high, home bp medications ordered. Tylenol for pain. Pt hungry, provided lunch box. Pt ambulated by Nurse to restroom about 15 feet away and oxygen dropped to 85%.          Medications   aspirin chewable tablet 81 mg (has no administration in time range) Units (has no administration in time range)   Vitamin D (CHOLECALCIFEROL) tablet 1,000 Units (has no administration in time range)   ascorbic acid (VITAMIN C) tablet 1,000 mg (has no administration in time range)   zinc sulfate (ZINCATE) capsule 50 mg (has no administration in time range)   albuterol sulfate  (90 Base) MCG/ACT inhaler 2 puff (has no administration in time range)   ipratropium (ATROVENT HFA) 17 MCG/ACT inhaler 2 puff (has no administration in time range)   remdesivir 200 mg in sodium chloride 0.9 % 250 mL IVPB (has no administration in time range)     Followed by   remdesivir 100 mg in sodium chloride 0.9 % 250 mL IVPB (has no administration in time range)   0.9 % sodium chloride bolus (has no administration in time range)   dexamethasone (DECADRON) injection 6 mg (has no administration in time range)   diphenhydrAMINE (BENADRYL) tablet 50 mg (50 mg Oral Given 4/19/21 2225)   methylPREDNISolone sodium (SOLU-MEDROL) injection 125 mg (125 mg Intravenous Given 4/19/21 2225)   hydrALAZINE (APRESOLINE) tablet 50 mg (50 mg Oral Given 4/19/21 2318)   iopamidol (ISOVUE-370) 76 % injection 75 mL (75 mLs Intravenous Given 4/19/21 2340)   dilTIAZem (CARDIZEM CD) extended release capsule 120 mg (120 mg Oral Given 4/20/21 0103)   carvedilol (COREG) tablet 25 mg (25 mg Oral Given 4/20/21 0103)   acetaminophen (TYLENOL) tablet 650 mg (650 mg Oral Given 4/20/21 0102)        Consult(s):   IP CONSULT TO PHARMACY    Procedure(s):   none    Medical Decision Making: Patient presents to emergency with coughing up streaks of blood for the past few days and recently developing sore throat, neck pain, backache. She did see her pulmonologist on Friday and he had given her prednisone pack for the wheezing. She reports she has had continued worsening coughing up of blood with larger pieces rather than just streaking.   Patient was found to be positive for Covid in department today and does get hypoxic with minimal

## 2021-04-20 NOTE — H&P
[facility] History and Physical      CHIEF COMPLAINT:  hemoptysis    History of Present Illness: 77-year-old female history of asthma, atherosclerotic heart disease, stroke, diabetes mellitus type 2, essential hypertension, chronic diastolic heart failure, morbid obesity. 6 days ago she started to develop a cough, shortness of breath, some wheezing, loss of appetite and generalized weakness. Had loose stool. No fever or chills. No sick contacts. On Tuesday she noticed a streak of blood in the sputum. Occurred again yesterday but was brighter red therefore she presented to ED for further evaluation. She complains of feeling some swelling in the left side of her neck associated with pain. In the ED she reportedly had a pulse ox as low as 89% on exertion. Tested positive for Covid. No sick contacts that she knows of.      Informant(s) for H&P: Patient    REVIEW OF SYSTEMS:  A comprehensive 14 point review of systems was negative except for: what is in the HPI    PMH:  Past Medical History:   Diagnosis Date    Asthma     Atherosclerosis of native artery of left leg with rest pain ([address] 11/9/2018    C. difficile diarrhea 2015    CAD (coronary artery disease) 2011    Cellulitis and abscess of trunk 4/14/2015    Cerebellar infarct ([address] 4/3/2019    Remote, rt. cerebellum, head CT scan, 1/9/19    Chronic back pain     Chronic kidney disease     Chronic systolic congestive heart failure ([address] 10/4/2013    Chronic venous insufficiency 10/11/2017    COPD (chronic obstructive pulmonary disease) ([address]     Depression     Diabetes mellitus ([address]     Diabetic neuropathy ([address]     Diverticulosis     Fatty liver 1/8/2016    per US    Glaucoma, open angle 2/1/2016    Mild-OU    Hepatic encephalopathy ([address] 02/07/2016    resolved    Hiatal hernia     History of blood transfusion     Hyperlipidemia     Hyperplastic colon polyp     Hypertension     Incontinence     Liver mass     [facility]bid [facility]     Movement disorder     [facility]ocardial infarction [facility]) 2011    Osteoarthritis, generalized     Pain in both lower legs 10/11/2017    Peripheral vascular disease ([facility]     Pneumonia 1/26/2014    Pulmonary edema     resolved    PVD (peripheral vascular disease) with claudication ([facility] 8/30/2017    Sleep apnea     uses BI PAP    Status post peripheral artery angioplasty 10/31/2019    Tobacco abuse     Tobacco abuse 10/11/2017    Tubular adenoma polyp of rectum     Urinary tract infection due to ESBL Klebsiella 03/08/2017    Ventral hernia        Surgical History:  Past Surgical History:   Procedure Laterality Date    ANGIOPLASTY  11/13/2018    Dr. Jeanie Piña & athrectomy L SFA & Popliteal    BREAST SURGERY  2000    bilateral reduction    BRONCHIAL BRUSH BIOPSY  1/25/2013    Dr Yani Cuba  04/20/2015    Dr. Vale Gutierrez  12/2011     DR. Sohail Waldron,  follows Dr Yani Montanez  11/15/2013    Dr Ashish Browning COLONOSCOPY  12/23/2016    Dr Wolf Genaro adenoma & hyperplastic polyps, melanosis coli (repeat one year 12/2017)    CORONARY ARTERY BYPASS GRAFT      ECHO COMPLETE  10/9/2013         ENDOSCOPY, COLON, DIAGNOSTIC  04/18/2017    GALLBLADDER SURGERY      gallstones removed, [facility] 8/2017    HYSTERECTOMY      JOINT REPLACEMENT  2011    LEFT KNEE    KNEE SURGERY      left knee replacement    LAYER WOUND CLOSURE Left [id number]    LIVER BIOPSY  1/8/2016    [facility]    POLYSOMNOGRAPHY  1/2016    [facility]    UPPER GASTROINTESTINAL ENDOSCOPY  12/20/12    UPPER GASTROINTESTINAL ENDOSCOPY  12/23/2016    Dr Ashish Browning UPPER GASTROINTESTINAL ENDOSCOPY  02/08/2017       Medications Prior to Admission:    Prior to Admission medications    Medication Sig Start Date End Date Taking?  Authorizing Provider   fluticasone (FLONASE) 50 MCG/ACT nasal spray USE 1 SPRAY IN EACH NOSTRIL TWICE A DAY 4/14/21  Yes Luisana Cote, DO   cetirizine (ZYRTEC) 10 MG tablet TAKE 1 TABLET BY MOUTH EVERY DAY 4/13/21  Yes Mell Hayes,    DULoxetine (CYMBALTA) 60 MG extended release capsule TAKE ONE (1) CAPSULE BY MOUTH TWICE DAILY 4/6/21  Yes Karen Hayes, DO   baclofen (LIORESAL) 10 MG tablet TAKE 1 TABLET BY MOUTH TWICE A DAY AS NEEDED  Patient taking differently: Take 10 mg by mouth 2 times daily TAKE 1 TABLET BY MOUTH TWICE A DAY AS NEEDED 4/6/21  Yes Mell Hayes DO   carvedilol (COREG) 25 MG tablet Take 1 tablet by mouth 2 times daily (with meals) 4/6/21  Yes Mayelin Chang MD   spironolactone (ALDACTONE) 25 MG tablet Take 1 tablet by mouth daily 3/16/21  Yes Mayelin Chang MD   triamcinolone (KENALOG) 0.1 % cream APPLY TWICE DAILY TO RASH 3/1/21  Yes Historical Provider, MD   senna (SENOKOT) 8.6 MG tablet Take 1 tablet by mouth daily    Yes Historical Provider, MD   potassium chloride (KLOR-CON 10) 10 MEQ extended release tablet Take 10 mEq by mouth daily    Yes Historical Provider, MD   omeprazole (PRILOSEC) 40 MG delayed release capsule Take 40 mg by mouth daily  3/11/21  Yes Historical Provider, MD   Lidocaine, Anorectal, 5 % CREA Apply topically as needed   Yes Historical Provider, MD   famotidine (PEPCID) 40 MG tablet Take 40 mg by mouth 2 times daily  3/11/21  Yes Historical Provider, MD   docusate sodium (COLACE) 100 MG capsule TAKE 2 CAPSULES BY MOUTH AT BEDTIME 3/11/21  Yes Historical Provider, MD   dilTIAZem (CARDIZEM) 120 MG tablet Take 120 mg by mouth daily  2/8/21  Yes Historical Provider, MD   ciclopirox (LOPROX) 0.77 % cream Apply topically 2 times daily   Yes Historical Provider, MD   cholestyramine (QUESTRAN) 4 g packet Take 1 packet by mouth daily    Yes Historical Provider, MD   ammonium lactate (LAC-HYDRIN) 12 % lotion APPLY EVERY DAY AFTER SHOWER 3/1/21  Yes Historical Provider, MD   hydrALAZINE (APRESOLINE) 50 MG tablet TAKE 1 TABLET BY MOUTH THREE TIMES A DAY 2/23/21  Yes Manoj Vargas YONI Hayes, DO   meloxicam (MOBIC) 7.5 MG tablet TAKE 1 TABLET BY MOUTH EVERY DAY 2/18/21  Yes Missy Leos,    aspirin (ASPIRIN LOW DOSE) 81 MG chewable tablet TAKE 1 TABLET BY MOUTH EVERY DAY 1/5/21  Yes Karen Hayes, DO   [employer] GAS RELIEF ULTRA STRENGTH 180 MG CAPS TAKE 1 CAPSULE BY MOUTH TWICE A DAY 10/8/20  Yes Historical Provider, MD   calcium carbonate (OSCAL) 500 MG TABS tablet Take 500 mg by mouth daily   Yes Historical Provider, MD   magnesium oxide (MAG-OX) 400 MG tablet Take 400 mg by mouth daily   Yes Historical Provider, MD   zinc 50 MG TABS tablet Take 50 mg by mouth daily   Yes Historical Provider, MD   vitamin D3 (CHOLECALCIFEROL) 25 MCG (1000 UT) TABS tablet TAKE 1 TABLET BY MOUTH EVERY DAY 11/24/20  Yes Sharyle Bernard M Mellott, DO   SPIRIVA RESPIMAT 1.25 MCG/ACT AERS inhaler INHALE TWO (2) PUFFS BY MOUTH EVERY DAY 11/24/20  Yes Jarett Hayes, DO   isosorbide mononitrate (IMDUR) 60 MG extended release tablet TAKE (1) TABLET BY MOUTH DAILY 11/18/20  Yes Stephany Tatum MD   MYRBETRIQ 50 MG TB24 TAKE 1 TABLET BY MOUTH EVERY DAY 11/17/20  Yes Karen Hayes,    bumetanide (BUMEX) 1 MG tablet TAKE (1) TABLET BY MOUTH DAILY 10/13/20  Yes Jarett Hayes,    traZODone (DESYREL) 100 MG tablet TAKE (1) TABLET BY MOUTH NIGHTLY 10/13/20  Yes Karen Hayes DO   montelukast (SINGULAIR) 10 MG tablet TAKE 1 TABLET BY MOUTH NIGHTLY 10/7/20  Yes Sharyle Bernard M Mellott, DO   simvastatin (ZOCOR) 20 MG tablet TAKE 1 TABLET BY MOUTH NIGHTLY 10/6/20  Yes Stephany Tatum MD   azelastine (ASTELIN) 0.1 % nasal spray 1 spray by Nasal route 2 times daily Use in each nostril as directed 9/16/20  Yes Codi Diaz,    ENTRESTO 24-26 MG per tablet TAKE ONE (1) TABLET BY MOUTH TWICE DAILY 9/9/20  Yes Stephany Tatum MD   ondansetron (ZOFRAN) 4 MG tablet TAKE 1 TABLET BY MOUTH EVERY 6 HOURS AS NEEDED FOR NAUSEA 9/1/20  Yes Historical Provider, MD   ketorolac (ACULAR) 0.5 % ophthalmic solution 1 drop 4 times daily   Yes Historical Provider, MD   loperamide (IMODIUM) 2 MG capsule Take 2 mg by mouth 4 times daily as needed for Diarrhea   Yes Historical Provider, MD   Nutritional Supplements (GLUCOSE MANAGEMENT) TABS Use daily prn if low glucose <60 6/26/19  Yes Liv Hayes DO   hydrocortisone 2.5 % cream APPLY TO AFFECTED AREA TWICE DAILY 6/26/19  Yes Liv Hayes DO   olopatadine (PATADAY) 0.2 % SOLN ophthalmic solution Place 1 drop into both eyes daily   Yes Historical Provider, MD   Multiple Vitamins-Minerals (THERAPEUTIC MULTIVITAMIN-MINERALS) tablet Take 1 tablet by mouth daily   Yes Historical Provider, MD   albuterol (PROVENTIL) (2.5 MG/3ML) 0.083% nebulizer solution Take 2.5 mg by nebulization every 6 hours as needed for Wheezing   Yes Historical Provider, MD   BiPAP Machine MISC by Does not apply route nightly 27/23   Yes Suze Nava MD   Insulin Degludec (TRESIBA FLEXTOUCH) 200 UNIT/ML SOPN INJECT 50 UNITS INTO THE SKIN nightly 4/15/21   Liv Hayes DO   polyethylene glycol (GLYCOLAX) 17 GM/SCOOP powder polyethylene glycol 3350 17 gram/dose oral powder    Historical Provider, MD   Misc.  Devices (NOVA CUSHION GEL SEAT PAD) MISC Use daily 2/16/21   Liv Hayes DO   Alcohol Swabs ([employer]S ALCOHOL PREP SWABS) 70 % PADS Use daily 12/29/20   Liv Hayes DO   blood glucose test strips (ACCU-CHEK GUIDE) strip 1 each by In Vitro route 3 times daily 12/29/20   Liv Hayes DO   Accu-Chek FastClix Lancets MISC Use 4x daily 12/28/20   Liv Hayes DO   B-D UF III MINI PEN NEEDLES 31G X 5 MM MISC USE AS DIRECTED 10/13/20   Liv Hayes DO   Handicap Placard MISC by Does not apply route Patient cannot walk 200 ft without stopping to rest.    Expiration 8/2022 7/30/20   Liv Hayes DO   Blood Glucose Monitoring Suppl (ACCU-CHEK GUIDE ME) w/Device KIT Use as directed 4/10/20   Otelia Baas Freddy, DO   SOFT TOUCH LANCETS MISC Use 3 x daily 4/10/20

## 2021-04-20 NOTE — CONSULTS
Pulmonary Consultation    Admit Date: 4/19/2021    Requesting Physician: Maite Rosario DO    CC:     HPI:  This is a 76 y.o. female who presented with on 4/19 with cough sputum blood-streaked. With sore throat right-sided chest pain  dizziness, fatigue, leg swelling, palpitations or slow heart beat. She has prior history of asthma and COPD in the past.  Since onset the symptoms have been gradually worsening and moderate in severity. The symptoms are aggravated by exertion and relieved by nothing and pt reports she pulmonolgist friday and he started on prednisone. . Pt wears oxygenated bipap for sleep, does not use oxygen regularly at home. Pt continues to smoke. Pt reports she has been having high blood pressure readings despite multiple medications and her doctor is referring her to Nephrology, no history of kidney disease. Pt follows with Dr. Lor Chaves for pulmonology. Patient found to be Covid positive  Desaturating to 85% on walking. Requiring 3 L  CTA positive for groundglass opacities        Patient says that she was coughing up some blood streaks when seen in the office on 4/16. Now she feels that she is coughing up more blood. She denies having any nose bleeding. She stopped smoking about a week ago. She was smoking 5 cigarettes a day. She also smokes marijuana 1 joint a day. She said he has chronic back pain a disc in her neck and back and she cannot get any pain medicines and thus marijuana puts her to sleep. She does use her BiPAP every night. She denies drinking alcohol or using any other drugs. She denies having any fevers at home no chills. She does not know where she got exposed to the Covid virus. She was actually supposed to get her vaccine today. She lives by herself and has an aide that comes and cooks and cleans for her. On debility  1. No  Patient recently seen in the hospital 4/16/2021 after she had epistasis and hypertensive crisis.               PMH:    Chronic hypercapnic respiratory failure  Severe cardiomyopathy with the EF 35% on echo from 1/4/2017.   severe restrictive lung disease  chronic systolic heart failure on diuretics  Noncompliance with diet or fluid restriction  mild intermittent asthma  Nicotine addiction and smoking one fourth a pack a day   Obstructive sleep apnea on BiPAP 27/23 daily at bedtime  Morbid obesity  CAD MI h/o CABG  Morbid obesity with BMI 46  Diabetes with neuropathy  Hypertension  Depression  Multilevel degenerative disc disease stable compression L4 spinal nerve  Right Ankle pain   Spinal stenosis   Diverticulosis  Fatty liver  Glaucoma  Hiatal hernia  Ventral hernia  Stress incontinence    Osteoarthritis of knee  History of GERD        Past Medical History:   Diagnosis Date    Asthma     Atherosclerosis of native artery of left leg with rest pain ([address] 11/9/2018    C. difficile diarrhea 2015    CAD (coronary artery disease) 2011    Cellulitis and abscess of trunk 4/14/2015    Cerebellar infarct ([address] 4/3/2019    Remote, rt. cerebellum, head CT scan, 1/9/19    Chronic back pain     Chronic kidney disease     Chronic systolic congestive heart failure ([address] 10/4/2013    Chronic venous insufficiency 10/11/2017    COPD (chronic obstructive pulmonary disease) ([address]     Depression     Diabetes mellitus ([address]     Diabetic neuropathy ([address]     Diverticulosis     Fatty liver 1/8/2016    per US    Glaucoma, open angle 2/1/2016    Mild-OU    Hepatic encephalopathy ([address] 02/07/2016    resolved    Hiatal hernia     History of blood transfusion     Hyperlipidemia     Hyperplastic colon polyp     Hypertension     Incontinence     Liver mass     Morbid obesity ([address]     Movement disorder     Myocardial infarction ([address] 2011    Osteoarthritis, generalized     Pain in both lower legs 10/11/2017    Peripheral vascular disease ([address]     Pneumonia 1/26/2014    Pulmonary edema     resolved    PVD (peripheral vascular disease) with claudication ([facility] 8/30/2017    Sleep apnea     uses BI PAP    Status post peripheral artery angioplasty 10/31/2019    Tobacco abuse     Tobacco abuse 10/11/2017    Tubular adenoma polyp of rectum     Urinary tract infection due to ESBL Klebsiella 03/08/2017    Ventral hernia      PSH:   Breast reduction surgery  Cardiac surgery, CABG  Cholecystectomy  Left knee replacement  Liver biopsy  Multiple colostomies with tubular adenomas  Total hysterectomy  Past Surgical History:   Procedure Laterality Date    ANGIOPLASTY  11/13/2018    Dr. Cathy Mancera & athrectomy L SFA & Popliteal    BREAST SURGERY  2000    bilateral reduction    BRONCHIAL BRUSH BIOPSY  1/25/2013    Dr Marcus Sensor  04/20/2015    Dr. Charline Cho  12/2011     DR. Nam Daniel,  follows Dr Rosa Millan  11/15/2013    Dr Angelic Nichols    COLONOSCOPY  12/23/2016    Dr Everett Schroeder adenoma & hyperplastic polyps, melanosis coli (repeat one year 12/2017)    CORONARY ARTERY BYPASS GRAFT      ECHO COMPLETE  10/9/2013         ENDOSCOPY, COLON, DIAGNOSTIC  04/18/2017    GALLBLADDER SURGERY      gallstones removed, [facility] 8/2017    HYSTERECTOMY      JOINT REPLACEMENT  2011    LEFT KNEE    KNEE SURGERY      left knee replacement    LAYER WOUND CLOSURE Left [id number]    LIVER BIOPSY  1/8/2016    [facility]    POLYSOMNOGRAPHY  1/2016    [facility]    UPPER GASTROINTESTINAL ENDOSCOPY  12/20/12    UPPER GASTROINTESTINAL ENDOSCOPY  12/23/2016    Dr Jazlyn Mahan UPPER GASTROINTESTINAL ENDOSCOPY  02/08/2017          Medications:     dextrose      sodium chloride        aspirin  81 mg Oral Daily    baclofen  10 mg Oral BID    carvedilol  25 mg Oral BID WC    dilTIAZem  120 mg Oral Daily    DULoxetine  60 mg Oral BID    hydrALAZINE  50 mg Oral 3 times per day    isosorbide mononitrate  30 mg Oral Daily    montelukast  10 mg Oral Nightly    trospium  20 mg Oral BID AC  atorvastatin  10 mg Oral Daily    traZODone  100 mg Oral Nightly    sodium chloride flush  10 mL Intravenous 2 times per day    insulin glargine  50 Units Subcutaneous Nightly    insulin lispro  0.08 Units/kg Subcutaneous TID WC    insulin lispro  0-12 Units Subcutaneous TID     insulin lispro  0-6 Units Subcutaneous Nightly    Vitamin D  1,000 Units Oral Daily    vitamin C  1,000 mg Oral BID    zinc sulfate  50 mg Oral BID    ipratropium  2 puff Inhalation 4x daily    remdesivir IVPB  100 mg Intravenous Q24H    dexamethasone  6 mg Intravenous Q12H    famotidine  20 mg Oral BID    predniSONE  50 mg Oral Once    Followed by   Marcia Perea ON 2021] predniSONE  50 mg Oral Once    Followed by   Marcia Perea ON 2021] predniSONE  50 mg Oral Once    Followed by   Marcia Perea ON 2021] diphenhydrAMINE  50 mg Oral Once       Allergies: Allergies   Allergen Reactions    Latex Hives    Bee Venom Anaphylaxis    Dilaudid [Hydromorphone Hcl] Itching    Dye [Iodides] Hives and Shortness Of Breath    Percocet [Oxycodone-Acetaminophen] Shortness Of Breath and Itching    Keflex [Cephalexin] Itching and Rash    Lasix [Furosemide] Other (See Comments)     Pt states she cramps up and gets headaches    Levaquin [Levofloxacin In D5w] Hives    Lipitor      MUSCLE SPASMS    Lyrica [Pregabalin]      Dream disturbances    Morphine Hives    Naproxen      Unsure of reaction;pt states able to take Aleve without difficulties    Nefazodone Other (See Comments)    Norvasc [Amlodipine] Swelling    Oxycodone-Acetaminophen Swelling    Shellfish-Derived Products     Trazodone And Nefazodone      Social History:  Smokes 1/4  PPD. 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 . Has no exposures to vapors, gases, dust or fumes.   She also smokes marijuana     Family history:Father  of asthma in his 25s  Mother  of colon cancer  Sr. with intracranial aneurysm  One brother  of prostate cancer  Patient is  and has 2 children  She lives with her daughter      Respiratory ROS: no cough, shortness of breath, or wheezing  Otherwise, a complete review of systems is undertaken and is negative. Physical Examination    Vitals:  VITALS:  BP (!) 118/56   Pulse 50   Temp 97 °F (36.1 °C) (Oral)   Resp 18   Ht 5' 2\" (1.575 m)   Wt 221 lb (100.2 kg)   LMP 01/01/1990   SpO2 94%   BMI 40.42 kg/m²   24HR INTAKE/OUTPUT:      Intake/Output Summary (Last 24 hours) at 4/20/2021 1444  Last data filed at 4/20/2021 1430  Gross per 24 hour   Intake 240 ml   Output --   Net 240 ml         General: No distress. Alert.,  Morbidly obese  Hard of hearing  Eyes: PERRL. No sclera icterus. ENT: No discharge. Pharynx clear. Nasal septum midline   Neck: Trachea midline. Normal thyroid. no JVD  Resp: No accessory muscle use. No crackles. No wheezing. No rhonchi. Symmetrical exansion  CV: PMI non displaced. Regular rate. Regular rhythm. No mumur or rub. ABD: Non-tender. Non-distended. No organmegaly. Normal bowel sounds. Skin: Warm and dry. No rash on exposed extremities. Lymph: No cervical LAD. No supraclavicular LAD. Ext: No joint deformity. No clubbing. No cyanosis. No edema  Neuro: Awake. Follows commands. No focal deficits.   Moves all ext     Lab Results:    CBC:   Recent Labs     04/19/21 2052 04/20/21  0320   WBC 4.6 4.3*   HGB 13.3 12.7   HCT 41.5 40.1   MCV 96.7 98.0    176     BMP:   Recent Labs     04/19/21 2052 04/20/21  0320    135   K 5.8* 4.3    100   CO2 28 25   BUN 24* 21   CREATININE 1.0 0.9      ALB:3,BILIDIR:3,BILITOT:3,ALKPHOS:3)@  PT/INR:   Recent Labs     04/19/21 2052   PROTIME 12.6*   INR 1.1       Cultures:  -    Films:  CXR 4/19 left upper lobe opacity    CT 4/19 CT chest groundglass opacities    Assessment/Plan:   76y.o. year old female who presented with cough sore throat  4/19 CT scan groundglass opacities  Covid positive rapid  4/20 blood pressure 200/45   proBNP 1179  1. Covid pneumonitis  2. Acute hypoxic respiratory failure   3. Asthma/COPD  4. Component diastolic heart failure  5. Chronic hypercapnic respiratory failure on BiPAP daily at bedtime  6. Severe cardiomyopathy with the EF 35% on echo from 1/4/2017. Repeat echo 6/5/2018 EF 84% stage II diastolic dysfunction on Entresto  7. Ischemic cardiomyopathy status post CABG times 2/20/2011/non-ST MI 6/2018  8. Chronic RBBB B  9. Severe restrictive lung disease with positive methacholine challenge with mild intermittent asthma  10. DAYAN on BiPAP 27/23 daily at bedtime  11. Morbid obesity BMI 40  12. Nicotine addiction with smoking 12 5 days ago  13. Chronic pain syndrome  14. Chronic constipation  15. Recent admission 3/12/2021 with hypertensive urgency         Placed on IV steroids  Follow blood sugars   Component of diastolic heart failure would benefit from diuretics  Steroids already on Decadron DC prednisone  Remdesivir  Follow inflammatory markers    Thanks for letting us see this patient in consultation. Please contact us with any questions.       Electronically signed by Bertin Lechuga MD on 4/20/2021 at 2:44 PM

## 2021-04-20 NOTE — PROGRESS NOTES
[facility] Progress Note    Admitting Date and Time: 4/19/2021  7:47 PM  Admit Dx: Acute respiratory disease due to 2019 novel coronavirus [U07.1, J06.9]    Subjective:  Patient is being followed for Acute respiratory disease due to 2019 novel coronavirus [U07.1, J06.9]   Pt feels tired and pain all over. ROS: denies fever, chills, cp, n/v, HA unless stated above.       aspirin  81 mg Oral Daily    baclofen  10 mg Oral BID    carvedilol  25 mg Oral BID WC    dilTIAZem  120 mg Oral Daily    DULoxetine  60 mg Oral BID    hydrALAZINE  50 mg Oral 3 times per day    isosorbide mononitrate  30 mg Oral Daily    montelukast  10 mg Oral Nightly    trospium  20 mg Oral BID AC    atorvastatin  10 mg Oral Daily    traZODone  100 mg Oral Nightly    sodium chloride flush  10 mL Intravenous 2 times per day    insulin glargine  50 Units Subcutaneous Nightly    insulin lispro  0.08 Units/kg Subcutaneous TID WC    insulin lispro  0-12 Units Subcutaneous TID WC    insulin lispro  0-6 Units Subcutaneous Nightly    Vitamin D  1,000 Units Oral Daily    vitamin C  1,000 mg Oral BID    zinc sulfate  50 mg Oral BID    ipratropium  2 puff Inhalation 4x daily    remdesivir IVPB  100 mg Intravenous Q24H    dexamethasone  6 mg Intravenous Q12H    famotidine  20 mg Oral BID     glucose, 15 g, PRN  dextrose, 12.5 g, PRN  glucagon (rDNA), 1 mg, PRN  dextrose, 100 mL/hr, PRN  sodium chloride flush, 10 mL, PRN  sodium chloride, 25 mL, PRN  polyethylene glycol, 17 g, Daily PRN  acetaminophen, 650 mg, Q6H PRN    Or  acetaminophen, 650 mg, Q6H PRN  albuterol sulfate HFA, 2 puff, Q6H PRN  sodium chloride, 30 mL, PRN  hydrALAZINE, 10 mg, Q4H PRN         Objective:    BP (!) 154/84   Pulse 73   Temp 97 °F (36.1 °C) (Oral)   Resp 18   Ht 5' 2\" (1.575 m)   Wt 221 lb (100.2 kg)   LMP 01/01/1990   SpO2 97%   BMI 40.42 kg/m²     General Appearance: alert and oriented to person, place and time and in no acute distress  Skin: warm and dry  Head: normocephalic and atraumatic  Eyes: pupils equal, round, and reactive to light, extraocular eye movements intact, conjunctivae normal  Neck: neck supple and non tender without mass   Pulmonary/Chest: clear to auscultation bilaterally- no wheezes, rales or rhonchi, normal air movement, no respiratory distress  Cardiovascular: normal rate, normal S1 and S2 and no carotid bruits  Abdomen: soft, non-tender, non-distended, normal bowel sounds, no masses or organomegaly  Extremities: no cyanosis, no clubbing and no edema  Neurologic: no cranial nerve deficit and speech normal        Recent Labs     04/19/21 2052 04/20/21  0320    135   K 5.8* 4.3    100   CO2 28 25   BUN 24* 21   CREATININE 1.0 0.9   GLUCOSE 221* 252*   CALCIUM 9.0 8.4*       Recent Labs     04/19/21 2052 04/20/21  0320   WBC 4.6 4.3*   RBC 4.29 4.09   HGB 13.3 12.7   HCT 41.5 40.1   MCV 96.7 98.0   MCH 31.0 31.1   MCHC 32.0 31.7*   RDW 12.6 12.6    176   MPV 10.7 10.5       Assessment:    Active Problems:    Acute respiratory disease due to 2019 novel coronavirus  Resolved Problems:    * No resolved hospital problems. *      Plan:  1. Acute hypoxic respiratory failure, O2 sat was in the 80s on admission, now requiring 2 L O2, due to COVID-19 pneumonia, will treat underlying process and wean off oxygen. CTA ruled out PE  2.  COVID-19 pneumonia, start the patient on Decadron and remdesivir, monitor inflammatory markers. 3.  Hypertension, not controlled continue on Coreg and diltiazem, better readings for the blood pressure will adjust medication. 4.  History of diabetes type 2, continue on Lantus and sliding scale insulin  5. History of asthma and COPD, probably mild decompensation, continue on steroids bronchodilators, consider consulting pulmonary. 6.  Hx of CAD, continue on asa, stating and BB,   7.   Sinus pause 3.5 sec, decrease coreg to 12.5 mg bid    NOTE: This report was transcribed using voice recognition software. Every effort was made to ensure accuracy; however, inadvertent computerized transcription errors may be present.   Electronically signed by Ann Ceballos MD on 4/20/2021 at 9:21 AM

## 2021-04-20 NOTE — PROGRESS NOTES
Spoke to Dr. Mera Hernandez via telephone re: patient complaining of back pain. Norco ordered, confirmed with Dr. Mera Hernandez okay to administer with flagged allergies.

## 2021-04-20 NOTE — PROGRESS NOTES
P Quality Flow/Interdisciplinary Rounds Progress Note        Quality Flow Rounds held on April 20, 2021    Disciplines Attending:  Bedside Nurse and     Fernanda Guerrero was admitted on 4/19/2021  7:47 PM    Anticipated Discharge Date:  Expected Discharge Date: 04/25/21    Disposition:    Jadon Score:  Jadon Scale Score: 19    Readmission Risk              Risk of Unplanned Readmission:        28           Discussed patient goal for the day, patient clinical progression, and barriers to discharge. The following Goal(s) of the Day/Commitment(s) have been identified:  wean oxygen.       Oneyda Coon  April 20, 2021

## 2021-04-20 NOTE — PROGRESS NOTES
Iodine prep ordered per Dr. Sunil Abdi, and to start at 2000 tonight because it needs to be 13 hours prior to CT, and CT states they will not be able to do it at [address] but will be able to at 0900 tomorrow. Prep ordered and pharmacist states okay to give prednisone for prep while on decadron. Dr. Sunil Abdi notified.

## 2021-04-20 NOTE — PROGRESS NOTES
Dr. Giovanny Linton notified patient's heart rate in the 40's and feeling light headed at times. Also notified that patient's blood pressure significantly lower then this am, although it is still 118/56. New orders taken.

## 2021-04-20 NOTE — HOME CARE
[facility] referral received, awaiting final orders. Spoke with patient and verified demographics. Will follow. Libby Foster LPN [facility].

## 2021-04-20 NOTE — TELEPHONE ENCOUNTER
Patient would like the Doctor to call her at the hospital as she is very upset about being admitted for Covid -19 and coughing so badly that she was coughing up blood

## 2021-04-20 NOTE — ACP (ADVANCE CARE PLANNING)
Advance Care Planning     Advance Care Planning Activator (Inpatient)  Conversation Note      Date of ACP Conversation: 4/19/2021    Conversation Conducted with: Patient with Decision Making Capacity Ricky Marquez     ACP Activator: [id number] Decision Maker:     Current Designated Health Care Decision Maker:     Primary Decision Maker: Veronica Peralta - Child - [phone]    Secondary Decision Maker: Oscar Choi - Brother/Sister - [phone]  Click here to complete Healthcare Decision Makers including section of the Healthcare Decision Maker Relationship (ie \"Primary\")      Care Preferences    Ventilation: \"If you were in your present state of health and suddenly became very ill and were unable to breathe on your own, what would your preference be about the use of a ventilator (breathing machine) if it were available to you? \"      Would the patient desire the use of ventilator (breathing machine)?: yes    \"If your health worsens and it becomes clear that your chance of recovery is unlikely, what would your preference be about the use of a ventilator (breathing machine) if it were available to you? \"     Would the patient desire the use of ventilator (breathing machine)?: Yes      Resuscitation  \"CPR works best to restart the heart when there is a sudden event, like a heart attack, in someone who is otherwise healthy. Unfortunately, CPR does not typically restart the heart for people who have serious health conditions or who are very sick. \"    \"In the event your heart stopped as a result of an underlying serious health condition, would you want attempts to be made to restart your heart (answer \"yes\" for attempt to resuscitate) or would you prefer a natural death (answer \"no\" for do not attempt to resuscitate)? \" yes       [x] Yes   [] No   Educated Patient / [place] regarding differences between Advance Directives and portable DNR orders.     Length of ACP Conversation in minutes:  15 minutes    Conversation Outcomes:  [x] ACP discussion completed  [] Existing advance directive reviewed with patient; no changes to patient's previously recorded wishes  [] New Advance Directive completed  [] Portable Do Not Rescitate prepared for Provider review and signature  [] POLST/POST/MOLST/MOST prepared for Provider review and signature      Follow-up plan:    [x] Schedule follow-up conversation to continue planning  [] Referred individual to Provider for additional questions/concerns   [] Advised patient/agent/surrogate to review completed ACP document and update if needed with changes in condition, patient preferences or care setting    [] This note routed to one or more involved healthcare providers

## 2021-04-21 ENCOUNTER — TELEPHONE (OUTPATIENT)
Dept: ENT CLINIC | Age: 66
End: 2021-04-21

## 2021-04-21 ENCOUNTER — APPOINTMENT (OUTPATIENT)
Dept: CT IMAGING | Age: 66
DRG: 177 | End: 2021-04-21
Payer: MEDICARE

## 2021-04-21 LAB
ANION GAP SERPL CALCULATED.3IONS-SCNC: 6 MMOL/L (ref 7–16)
ATYPICAL LYMPHOCYTE RELATIVE PERCENT: 2 % (ref 0–4)
BASOPHILS ABSOLUTE: 0 E9/L (ref 0–0.2)
BASOPHILS RELATIVE PERCENT: 0 % (ref 0–2)
BUN BLDV-MCNC: 23 MG/DL (ref 8–23)
CALCIUM SERPL-MCNC: 8.8 MG/DL (ref 8.6–10.2)
CHLORIDE BLD-SCNC: 100 MMOL/L (ref 98–107)
CO2: 28 MMOL/L (ref 22–29)
CREAT SERPL-MCNC: 0.8 MG/DL (ref 0.5–1)
D DIMER: 379 NG/ML DDU
EOSINOPHILS ABSOLUTE: 0 E9/L (ref 0.05–0.5)
EOSINOPHILS RELATIVE PERCENT: 0 % (ref 0–6)
GFR AFRICAN AMERICAN: >60
GFR NON-AFRICAN AMERICAN: >60 ML/MIN/1.73
GLUCOSE BLD-MCNC: 249 MG/DL (ref 74–99)
HCT VFR BLD CALC: 43.3 % (ref 34–48)
HEMOGLOBIN: 13.7 G/DL (ref 11.5–15.5)
L. PNEUMOPHILA SEROGP 1 UR AG: NORMAL
LYMPHOCYTES ABSOLUTE: 0.29 E9/L (ref 1.5–4)
LYMPHOCYTES RELATIVE PERCENT: 3 % (ref 20–42)
MCH RBC QN AUTO: 31.1 PG (ref 26–35)
MCHC RBC AUTO-ENTMCNC: 31.6 % (ref 32–34.5)
MCV RBC AUTO: 98.2 FL (ref 80–99.9)
METER GLUCOSE: 223 MG/DL (ref 74–99)
METER GLUCOSE: 258 MG/DL (ref 74–99)
METER GLUCOSE: 269 MG/DL (ref 74–99)
METER GLUCOSE: 277 MG/DL (ref 74–99)
MONOCYTES ABSOLUTE: 0.06 E9/L (ref 0.1–0.95)
MONOCYTES RELATIVE PERCENT: 1 % (ref 2–12)
NEUTROPHILS ABSOLUTE: 5.36 E9/L (ref 1.8–7.3)
NEUTROPHILS RELATIVE PERCENT: 94 % (ref 43–80)
PDW BLD-RTO: 12.7 FL (ref 11.5–15)
PLATELET # BLD: 209 E9/L (ref 130–450)
PMV BLD AUTO: 11 FL (ref 7–12)
POTASSIUM REFLEX MAGNESIUM: 4.9 MMOL/L (ref 3.5–5)
RBC # BLD: 4.41 E12/L (ref 3.5–5.5)
RBC # BLD: NORMAL 10*6/UL
SODIUM BLD-SCNC: 134 MMOL/L (ref 132–146)
STREP PNEUMONIAE ANTIGEN, URINE: NORMAL
WBC # BLD: 5.7 E9/L (ref 4.5–11.5)

## 2021-04-21 PROCEDURE — 2580000003 HC RX 258: Performed by: INTERNAL MEDICINE

## 2021-04-21 PROCEDURE — 94660 CPAP INITIATION&MGMT: CPT

## 2021-04-21 PROCEDURE — 99221 1ST HOSP IP/OBS SF/LOW 40: CPT | Performed by: OTOLARYNGOLOGY

## 2021-04-21 PROCEDURE — 6360000002 HC RX W HCPCS: Performed by: NURSE PRACTITIONER

## 2021-04-21 PROCEDURE — 6360000002 HC RX W HCPCS: Performed by: INTERNAL MEDICINE

## 2021-04-21 PROCEDURE — 6370000000 HC RX 637 (ALT 250 FOR IP): Performed by: INTERNAL MEDICINE

## 2021-04-21 PROCEDURE — 85025 COMPLETE CBC W/AUTO DIFF WBC: CPT

## 2021-04-21 PROCEDURE — 80048 BASIC METABOLIC PNL TOTAL CA: CPT

## 2021-04-21 PROCEDURE — 2700000000 HC OXYGEN THERAPY PER DAY

## 2021-04-21 PROCEDURE — 70491 CT SOFT TISSUE NECK W/DYE: CPT

## 2021-04-21 PROCEDURE — 36415 COLL VENOUS BLD VENIPUNCTURE: CPT

## 2021-04-21 PROCEDURE — 2060000000 HC ICU INTERMEDIATE R&B

## 2021-04-21 PROCEDURE — 6360000004 HC RX CONTRAST MEDICATION: Performed by: RADIOLOGY

## 2021-04-21 PROCEDURE — 85378 FIBRIN DEGRADE SEMIQUANT: CPT

## 2021-04-21 PROCEDURE — 2500000003 HC RX 250 WO HCPCS: Performed by: INTERNAL MEDICINE

## 2021-04-21 PROCEDURE — 82962 GLUCOSE BLOOD TEST: CPT

## 2021-04-21 PROCEDURE — 99233 SBSQ HOSP IP/OBS HIGH 50: CPT | Performed by: INTERNAL MEDICINE

## 2021-04-21 RX ORDER — GABAPENTIN 100 MG/1
100 CAPSULE ORAL 3 TIMES DAILY
Status: DISCONTINUED | OUTPATIENT
Start: 2021-04-21 | End: 2021-04-24 | Stop reason: HOSPADM

## 2021-04-21 RX ADMIN — GABAPENTIN 100 MG: 100 CAPSULE ORAL at 20:41

## 2021-04-21 RX ADMIN — BACLOFEN 10 MG: 10 TABLET ORAL at 20:41

## 2021-04-21 RX ADMIN — IPRATROPIUM BROMIDE 2 PUFF: 17 AEROSOL, METERED RESPIRATORY (INHALATION) at 07:59

## 2021-04-21 RX ADMIN — DEXAMETHASONE SODIUM PHOSPHATE 6 MG: 4 INJECTION, SOLUTION INTRAMUSCULAR; INTRAVENOUS at 01:18

## 2021-04-21 RX ADMIN — HYDROCODONE BITARTRATE AND ACETAMINOPHEN 1 TABLET: 5; 325 TABLET ORAL at 01:39

## 2021-04-21 RX ADMIN — IPRATROPIUM BROMIDE 2 PUFF: 17 AEROSOL, METERED RESPIRATORY (INHALATION) at 17:02

## 2021-04-21 RX ADMIN — PREDNISONE 50 MG: 5 TABLET ORAL at 07:57

## 2021-04-21 RX ADMIN — IPRATROPIUM BROMIDE 2 PUFF: 17 AEROSOL, METERED RESPIRATORY (INHALATION) at 20:41

## 2021-04-21 RX ADMIN — HYDRALAZINE HYDROCHLORIDE 10 MG: 20 INJECTION, SOLUTION INTRAMUSCULAR; INTRAVENOUS at 06:21

## 2021-04-21 RX ADMIN — DULOXETINE HYDROCHLORIDE 60 MG: 60 CAPSULE, DELAYED RELEASE ORAL at 20:41

## 2021-04-21 RX ADMIN — SODIUM CHLORIDE, PRESERVATIVE FREE 10 ML: 5 INJECTION INTRAVENOUS at 20:42

## 2021-04-21 RX ADMIN — IOPAMIDOL 75 ML: 755 INJECTION, SOLUTION INTRAVENOUS at 09:11

## 2021-04-21 RX ADMIN — PREDNISONE 50 MG: 5 TABLET ORAL at 01:18

## 2021-04-21 RX ADMIN — IPRATROPIUM BROMIDE 2 PUFF: 17 AEROSOL, METERED RESPIRATORY (INHALATION) at 12:29

## 2021-04-21 RX ADMIN — GABAPENTIN 100 MG: 100 CAPSULE ORAL at 14:11

## 2021-04-21 RX ADMIN — HYDROCODONE BITARTRATE AND ACETAMINOPHEN 1 TABLET: 5; 325 TABLET ORAL at 23:31

## 2021-04-21 RX ADMIN — HYDROCODONE BITARTRATE AND ACETAMINOPHEN 1 TABLET: 5; 325 TABLET ORAL at 17:02

## 2021-04-21 RX ADMIN — ATORVASTATIN CALCIUM 10 MG: 10 TABLET, FILM COATED ORAL at 07:59

## 2021-04-21 RX ADMIN — INSULIN LISPRO 6 UNITS: 100 INJECTION, SOLUTION INTRAVENOUS; SUBCUTANEOUS at 17:03

## 2021-04-21 RX ADMIN — OXYCODONE HYDROCHLORIDE AND ACETAMINOPHEN 1000 MG: 500 TABLET ORAL at 07:59

## 2021-04-21 RX ADMIN — CARVEDILOL 12.5 MG: 6.25 TABLET, FILM COATED ORAL at 17:02

## 2021-04-21 RX ADMIN — HYDROCODONE BITARTRATE AND ACETAMINOPHEN 1 TABLET: 5; 325 TABLET ORAL at 10:43

## 2021-04-21 RX ADMIN — OXYCODONE HYDROCHLORIDE AND ACETAMINOPHEN 1000 MG: 500 TABLET ORAL at 20:41

## 2021-04-21 RX ADMIN — DILTIAZEM HYDROCHLORIDE 120 MG: 30 TABLET, FILM COATED ORAL at 07:58

## 2021-04-21 RX ADMIN — PANTOPRAZOLE SODIUM 40 MG: 40 TABLET, DELAYED RELEASE ORAL at 06:19

## 2021-04-21 RX ADMIN — Medication 1000 UNITS: at 07:58

## 2021-04-21 RX ADMIN — DEXAMETHASONE 6 MG: 4 TABLET ORAL at 12:28

## 2021-04-21 RX ADMIN — ISOSORBIDE MONONITRATE 30 MG: 30 TABLET, EXTENDED RELEASE ORAL at 08:02

## 2021-04-21 RX ADMIN — ZINC SULFATE 220 MG (50 MG) CAPSULE 50 MG: CAPSULE at 20:41

## 2021-04-21 RX ADMIN — CARVEDILOL 12.5 MG: 6.25 TABLET, FILM COATED ORAL at 07:59

## 2021-04-21 RX ADMIN — TRAZODONE HYDROCHLORIDE 100 MG: 50 TABLET ORAL at 20:41

## 2021-04-21 RX ADMIN — INSULIN LISPRO 8 UNITS: 100 INJECTION, SOLUTION INTRAVENOUS; SUBCUTANEOUS at 12:30

## 2021-04-21 RX ADMIN — TROSPIUM CHLORIDE 20 MG: 20 TABLET, FILM COATED ORAL at 17:02

## 2021-04-21 RX ADMIN — HYDRALAZINE HYDROCHLORIDE 50 MG: 50 TABLET ORAL at 14:11

## 2021-04-21 RX ADMIN — BACLOFEN 10 MG: 10 TABLET ORAL at 07:58

## 2021-04-21 RX ADMIN — ASPIRIN 81 MG: 81 TABLET, CHEWABLE ORAL at 07:58

## 2021-04-21 RX ADMIN — INSULIN LISPRO 3 UNITS: 100 INJECTION, SOLUTION INTRAVENOUS; SUBCUTANEOUS at 20:49

## 2021-04-21 RX ADMIN — SODIUM CHLORIDE, PRESERVATIVE FREE 10 ML: 5 INJECTION INTRAVENOUS at 08:02

## 2021-04-21 RX ADMIN — HYDRALAZINE HYDROCHLORIDE 50 MG: 50 TABLET ORAL at 06:18

## 2021-04-21 RX ADMIN — DIPHENHYDRAMINE HCL 50 MG: 25 TABLET ORAL at 07:58

## 2021-04-21 RX ADMIN — INSULIN LISPRO 8 UNITS: 100 INJECTION, SOLUTION INTRAVENOUS; SUBCUTANEOUS at 17:03

## 2021-04-21 RX ADMIN — DULOXETINE HYDROCHLORIDE 60 MG: 60 CAPSULE, DELAYED RELEASE ORAL at 07:58

## 2021-04-21 RX ADMIN — HYDRALAZINE HYDROCHLORIDE 50 MG: 50 TABLET ORAL at 20:41

## 2021-04-21 RX ADMIN — INSULIN GLARGINE 50 UNITS: 100 INJECTION, SOLUTION SUBCUTANEOUS at 20:45

## 2021-04-21 RX ADMIN — INSULIN LISPRO 4 UNITS: 100 INJECTION, SOLUTION INTRAVENOUS; SUBCUTANEOUS at 12:30

## 2021-04-21 RX ADMIN — TROSPIUM CHLORIDE 20 MG: 20 TABLET, FILM COATED ORAL at 08:02

## 2021-04-21 RX ADMIN — REMDESIVIR 100 MG: 100 INJECTION, POWDER, LYOPHILIZED, FOR SOLUTION INTRAVENOUS at 21:02

## 2021-04-21 RX ADMIN — MONTELUKAST SODIUM 10 MG: 10 TABLET, FILM COATED ORAL at 20:41

## 2021-04-21 RX ADMIN — ZINC SULFATE 220 MG (50 MG) CAPSULE 50 MG: CAPSULE at 07:58

## 2021-04-21 ASSESSMENT — PAIN SCALES - GENERAL
PAINLEVEL_OUTOF10: 10
PAINLEVEL_OUTOF10: 4
PAINLEVEL_OUTOF10: 9

## 2021-04-21 ASSESSMENT — PAIN DESCRIPTION - PAIN TYPE
TYPE: CHRONIC PAIN
TYPE: CHRONIC PAIN

## 2021-04-21 ASSESSMENT — PAIN DESCRIPTION - LOCATION
LOCATION: BACK

## 2021-04-21 ASSESSMENT — PAIN DESCRIPTION - ONSET: ONSET: ON-GOING

## 2021-04-21 ASSESSMENT — PAIN DESCRIPTION - FREQUENCY: FREQUENCY: CONTINUOUS

## 2021-04-21 ASSESSMENT — PAIN - FUNCTIONAL ASSESSMENT: PAIN_FUNCTIONAL_ASSESSMENT: PREVENTS OR INTERFERES SOME ACTIVE ACTIVITIES AND ADLS

## 2021-04-21 NOTE — PROGRESS NOTES
P Quality Flow/Interdisciplinary Rounds Progress Note        Quality Flow Rounds held on April 21, 2021    Disciplines Attending:  Bedside Nurse and     Ruperto Wilson was admitted on 4/19/2021  7:47 PM    Anticipated Discharge Date:  Expected Discharge Date: 04/25/21    Disposition:    Jadon Score:  Jadon Scale Score: 23    Readmission Risk              Risk of Unplanned Readmission:        26           Discussed patient goal for the day, patient clinical progression, and barriers to discharge. The following Goal(s) of the Day/Commitment(s) have been identified:  wean oxygen.       Cintia Solders  April 21, 2021

## 2021-04-21 NOTE — PROGRESS NOTES
At [place] [address] when giving patient medication and bathing patient, I informed patient that I would call her daughter, Bedelia Parent,  between [phone] to update her as I had yesterday. After back from CT scan at 1000 I told patient that I hadn't been able to call yet but will be able to at 1100. She states she told her daughter earlier and she said okay. Updated daughter Bedelia Parent at 80. She was appreciative and had no complaints. Around  received call from patient advocate stating patient's daughter complaint about not receiving call back about parent. Spoke to patient's daughter Bedelia Parent and patient who states patient does not have a daughter by that name.

## 2021-04-21 NOTE — PROGRESS NOTES
Pulmonary Progress Note    Admit Date: 4/19/2021    Requesting Physician: Crow Barrientos DO    SUBJECTIVE:   Feels better this am  Slept well overnight with BiPAP on  Still coughing with blood tinged sputum  Complains of bilateral leg pain  On 2L nc - not on O2 at home  Medications:     dextrose      sodium chloride        aspirin  81 mg Oral Daily    baclofen  10 mg Oral BID    dilTIAZem  120 mg Oral Daily    DULoxetine  60 mg Oral BID    hydrALAZINE  50 mg Oral 3 times per day    isosorbide mononitrate  30 mg Oral Daily    montelukast  10 mg Oral Nightly    trospium  20 mg Oral BID AC    atorvastatin  10 mg Oral Daily    traZODone  100 mg Oral Nightly    sodium chloride flush  10 mL Intravenous 2 times per day    insulin glargine  50 Units Subcutaneous Nightly    insulin lispro  0.08 Units/kg Subcutaneous TID WC    insulin lispro  0-12 Units Subcutaneous TID WC    insulin lispro  0-6 Units Subcutaneous Nightly    Vitamin D  1,000 Units Oral Daily    vitamin C  1,000 mg Oral BID    zinc sulfate  50 mg Oral BID    ipratropium  2 puff Inhalation 4x daily    remdesivir IVPB  100 mg Intravenous Q24H    dexamethasone  6 mg Intravenous Q12H    carvedilol  12.5 mg Oral BID WC    pantoprazole  40 mg Oral QAM AC        Respiratory ROS:  cough, no shortness of breath, or wheezing  Otherwise, a complete review of systems is undertaken and is negative. Physical Examination    Vitals:  VITALS:  BP (!) 142/82   Pulse 75   Temp 97.7 °F (36.5 °C) (Oral)   Resp 16   Ht 5' 2\" (1.575 m)   Wt 221 lb (100.2 kg)   LMP 01/01/1990   SpO2 95%   BMI 40.42 kg/m²   24HR INTAKE/OUTPUT:      Intake/Output Summary (Last 24 hours) at 4/21/2021 0820  Last data filed at 4/20/2021 1430  Gross per 24 hour   Intake 240 ml   Output --   Net 240 ml     Saturating 93% on room air    General: No distress. Alert.,  Morbidly obese  Hard of hearing  Eyes: PERRL. No sclera icterus. ENT: No discharge. Pharynx clear. Nasal septum midline   Neck: Trachea midline. Normal thyroid. no JVD  Resp: No accessory muscle use. Crackles L base. No wheezing. No rhonchi. Symmetrical exansion  CV: PMI non displaced. Regular rate. Regular rhythm. No mumur or rub. ABD: Non-tender. Non-distended. No organmegaly. Normal bowel sounds. Skin: Warm and dry. No rash on exposed extremities. Lymph: No cervical LAD. No supraclavicular LAD. Ext: No joint deformity. No clubbing. No cyanosis. No edema  Neuro: Awake. Follows commands. No focal deficits. Moves all ext     Lab Results:    CBC:   Recent Labs     04/19/21 2052 04/20/21  0320 04/21/21  0605   WBC 4.6 4.3* 5.7   HGB 13.3 12.7 13.7   HCT 41.5 40.1 43.3   MCV 96.7 98.0 98.2    176 209     BMP:   Recent Labs     04/19/21 2052 04/20/21  0320 04/21/21  0605    135 134   K 5.8* 4.3 4.9    100 100   CO2 28 25 28   BUN 24* 21 23   CREATININE 1.0 0.9 0.8      ALB:3,BILIDIR:3,BILITOT:3,ALKPHOS:3)@  PT/INR:   Recent Labs     04/19/21 2052   PROTIME 12.6*   INR 1.1       Cultures:  -    Films:  CXR 4/19 left upper lobe opacity    CT 4/19 CT chest groundglass opacities    Assessment/Plan:   76 y.o. female who presented with cough, sore throat  4/19 CT scan groundglass opacities CT showed swelling of soft palate lesion at the base of tongue  Covid positive rapid  4/20 blood pressure 200/45   proBNP 1179  4/21 2L NC, wore BIPAP all night  4/21 CT chest 7 x 5 mm lesion central base of tongue      1. Covid pneumonitis  2. Acute hypoxic respiratory failure   3. Asthma/COPD/Severe restrictive lung disease with positive methacholine challenge with mild intermittent asthma  4. Chronic hypercapnic respiratory failure on BiPAP daily at bedtime  5. DAYAN on BiPAP 27/23 daily at bedtime  6. Component diastolic heart failure  7. Severe cardiomyopathy with the EF 35% on echo from 1/4/2017. Repeat echo 6/5/2018 EF 77% stage II diastolic dysfunction on Entresto  8.  Ischemic cardiomyopathy s/p CABG t/non-ST MI 6/2018  9. Chronic RBBB   10. Morbid obesity BMI 40  11. Nicotine addiction with smoking 12 5 days ago  12. Chronic pain syndrome  13. Chronic constipation  14. Recent admission 3/12/2021 with hypertensive urgency       PLAN:  · Continue O2 - on 2L nc, wean to keep pox >90%. Not on home O2. · Change Dexamethasone to oral daily  · Continue Remdesivir day #2  · Component of diastolic heart failure would benefit from diuretics  · Follow inflammatory markers  · Continue Zinc, Vit D, Vit C  · IS    SHUKRI Rivera    Electronically signed by PATRIZIA Rivera CNP on 4/21/2021 at 8:20 AM     I have personally participated in the history, exam, medical decision making with the NP on the date of service and I agree with all of the pertinent clinical information unless otherwise noted. I have also reviewed and agree with the past medical, family, and social history unless otherwise noted. Oxygenation improved. Patient is not requiring oxygen. Continue course of remdesivir.

## 2021-04-21 NOTE — PROGRESS NOTES
Spoke to [address] [place] [address] via telephone re: patient requesting to speak to her.

## 2021-04-21 NOTE — CONSULTS
Otolaryngology  Consult Note    Patient's Name/Date of Birth: Adolfo Noriega / 1955 (53 y.o.)    Date: April 21, 2021     Chief Complaint: Soft palate swelling and base of tongue lesion    HPI: 75 y/o F who has been treated for COVID pneumonia. She is a patient of Dr. Lucy Durham who has seen in the past for sinus complaints. She is resting comfortably in bed and complains of sore throat, dryness, intermittent epistaxis, and feeling of something in her throat. She has been on supplemental O2 and bipap. CT scan was performed that showed swelling of the soft palate and a lesion within the base of tongue.      Past Medical History:   Diagnosis Date    Asthma     Atherosclerosis of native artery of left leg with rest pain ([address] 11/9/2018    C. difficile diarrhea 2015    CAD (coronary artery disease) 2011    Cellulitis and abscess of trunk 4/14/2015    Cerebellar infarct ([address] 4/3/2019    Remote, rt. cerebellum, head CT scan, 1/9/19    Chronic back pain     Chronic kidney disease     Chronic systolic congestive heart failure ([address] 10/4/2013    Chronic venous insufficiency 10/11/2017    COPD (chronic obstructive pulmonary disease) ([address]     Depression     Diabetes mellitus ([address]     Diabetic neuropathy ([address]     Diverticulosis     Fatty liver 1/8/2016    per US    Glaucoma, open angle 2/1/2016    Mild-OU    Hepatic encephalopathy ([address] 02/07/2016    resolved    Hiatal hernia     History of blood transfusion     Hyperlipidemia     Hyperplastic colon polyp     Hypertension     Incontinence     Liver mass     Morbid obesity ([address]     Movement disorder     Myocardial infarction ([address] 2011    Osteoarthritis, generalized     Pain in both lower legs 10/11/2017    Peripheral vascular disease ([address]     Pneumonia 1/26/2014    Pulmonary edema     resolved    PVD (peripheral vascular disease) with claudication ([address] 8/30/2017    Sleep apnea     uses BI PAP    Status post peripheral artery angioplasty 10/31/2019    Tobacco abuse     Tobacco abuse 10/11/2017    Tubular adenoma polyp of rectum     Urinary tract infection due to ESBL Klebsiella 03/08/2017    Ventral hernia        Past Surgical History:   Procedure Laterality Date    ANGIOPLASTY  11/13/2018    Dr. Latricia Polanco & athrectomy L SFA & Popliteal    BREAST SURGERY  2000    bilateral reduction    BRONCHIAL BRUSH BIOPSY  1/25/2013    Dr Valentin Remedrodney  04/20/2015    Dr. Suarez Rolling  12/2011     DR. Constantino Muñoz,  follows Dr Jakub Olguin  11/15/2013    Dr Pop Ba    COLONOSCOPY  12/23/2016    Dr Дмитрий Bermudez adenoma & hyperplastic polyps, melanosis coli (repeat one year 12/2017)    CORONARY ARTERY BYPASS GRAFT      ECHO COMPLETE  10/9/2013         ENDOSCOPY, COLON, DIAGNOSTIC  04/18/2017    GALLBLADDER SURGERY      gallstones removed, [facility] 8/2017    HYSTERECTOMY      JOINT REPLACEMENT  2011    LEFT KNEE    KNEE SURGERY      left knee replacement    LAYER WOUND CLOSURE Left [id number]    LIVER [facility]SY  1/8/2016    [facility]    POLYSOMNOGRAPHY  1/2016    [facility]    UPPER GASTROINTESTINAL ENDOSCOPY  12/20/12    UPPER GASTROINTESTINAL ENDOSCOPY  12/23/2016    Dr Pop Ba   [facility] ENDOSCOPY  02/08/2017       Prior to Admission medications    Medication Sig Start Date End Date Taking? Authorizing Provider   lidocaine (XYLOCAINE) 5 % ointment Apply 1 Dose topically as needed (foot) Apply topically as needed.    Yes Historical Provider, MD   Insulin Degludec (TRESIBA FLEXTOUCH) 200 UNIT/ML SOPN INJECT 50 UNITS INTO THE SKIN nightly 4/15/21  Yes Karen Hayes, DO   fluticasone (FLONASE) 50 MCG/ACT nasal spray USE 1 SPRAY IN EACH NOSTRIL TWICE A DAY 4/14/21  Yes Karen Hayes, DO   cetirizine (ZYRTEC) 10 MG tablet TAKE 1 TABLET BY MOUTH EVERY DAY 4/13/21  Yes Omkar Hayes, DO   DULoxetine (CYMBALTA) 60 MG extended release capsule TAKE ONE (1) CAPSULE BY MOUTH TWICE DAILY 4/6/21  Yes [facility],    baclofen (LIORESAL) 10 MG tablet TAKE 1 TABLET BY MOUTH TWICE A DAY AS NEEDED  Patient taking differently: Take 10 mg by mouth 2 times daily as needed  4/6/21  Yes [facility] DO   carvedilol (COREG) 25 MG tablet Take 1 tablet by mouth 2 times daily (with meals) 4/6/21  Yes Pedro Victor MD   spironolactone (ALDACTONE) 25 MG tablet Take 1 tablet by mouth daily 3/16/21  Yes Pedro Victor MD   triamcinolone (KENALOG) 0.1 % cream APPLY TWICE DAILY TO RASH 3/1/21  Yes Historical Provider, MD   senna (SENOKOT) 8.6 MG tablet Take 1 tablet by mouth daily    Yes Historical Provider, MD   potassium chloride (KLOR-CON 10) 10 MEQ extended release tablet Take 10 mEq by mouth daily    Yes Historical Provider, MD   polyethylene glycol (GLYCOLAX) 17 GM/SCOOP powder Take 17 g by mouth daily as needed    Yes Historical Provider, MD   omeprazole (PRILOSEC) 40 MG delayed release capsule Take 40 mg by mouth daily  3/11/21  Yes Historical Provider, MD   Lidocaine, Anorectal, 5 % CREA Apply topically as needed   Yes Historical Provider, MD   famotidine (PEPCID) 40 MG tablet Take 40 mg by mouth 2 times daily  3/11/21  Yes Historical Provider, MD   docusate sodium (COLACE) 100 MG capsule TAKE 2 CAPSULES BY MOUTH AT BEDTIME 3/11/21  Yes Historical Provider, MD   dilTIAZem (CARDIZEM) 120 MG tablet Take 120 mg by mouth daily  2/8/21  Yes Historical Provider, MD   ciclopirox (LOPROX) 0.77 % cream Apply topically 2 times daily   Yes Historical Provider, MD   cholestyramine (QUESTRAN) 4 g packet Take 1 packet by mouth daily    Yes Historical Provider, MD   ammonium lactate (LAC-HYDRIN) 12 % lotion APPLY EVERY DAY AFTER SHOWER 3/1/21  Yes Historical Provider, MD   hydrALAZINE (APRESOLINE) 50 MG tablet TAKE 1 TABLET BY MOUTH THREE TIMES A DAY 2/23/21  Yes Karen Hayes,    meloxicam (MOBIC) 7.5 MG tablet TAKE 1 TABLET BY MOUTH EVERY DAY 2/18/21  Yes nasal spray 1 spray by Nasal route 2 times daily Use in each nostril as directed 9/16/20  Yes Jayne Diaz,    ENTRESTO 24-26 MG per tablet TAKE ONE (1) TABLET BY MOUTH TWICE DAILY 9/9/20  Yes Sean Garcia MD   ondansetron (ZOFRAN) 4 MG tablet TAKE 1 TABLET BY MOUTH EVERY 6 HOURS AS NEEDED FOR NAUSEA 9/1/20  Yes Historical Provider, MD   Handicap Placard MISC by Does not apply route Patient cannot walk 200 ft without stopping to rest.    Expiration 8/2022 7/30/20  Yes [address], DO   Blood Glucose Monitoring Suppl (ACCU-CHEK GUIDE ME) w/Device KIT Use as directed 4/10/20  Yes [facility], DO   SOFT TOUCH LANCETS MISC Use 3 x daily 4/10/20  Yes [facility], DO   ketorolac (ACULAR) 0.5 % ophthalmic solution 1 drop 4 times daily   Yes Historical Provider, MD   loperamide (IMODIUM) 2 MG capsule Take 2 mg by mouth 4 times daily as needed for Diarrhea   Yes Historical Provider, MD   Misc.  Devices [address] [facility] Power wheelchair 11/12/19  Yes [address], DO   Lift Chair [address] [facility] by Does not apply route Use daily 10/10/19  Yes [facility], DO   Nutritional Supplements (GLUCOSE MANAGEMENT) TABS Use daily prn if low glucose <60 6/26/19  Yes [address], DO   hydrocortisone 2.5 % cream APPLY TO AFFECTED AREA TWICE DAILY  Patient taking differently: Apply 1 Dose topically every 6 hours as needed APPLY TO AFFECTED AREA TWICE DAILY, legs 6/26/19  Yes [facility], DO   olopatadine (PATADAY) 0.2 % SOLN ophthalmic solution Place 1 drop into both eyes daily   Yes Historical Provider, MD   Multiple Vitamins-Minerals (THERAPEUTIC MULTIVITAMIN-MINERALS) tablet Take 1 tablet by mouth daily   Yes Historical Provider, MD   albuterol (PROVENTIL) (2.5 MG/3ML) 0.083% nebulizer solution Take 2.5 mg by nebulization every 6 hours as needed for Wheezing   Yes Historical Provider, MD   BiPAP Machine MISC by Does not apply route nightly 27/23   Yes Basim Blake MD       Allergies   Allergen Reactions    Latex Hives    Bee Venom Anaphylaxis    Dilaudid [Hydromorphone Hcl] Itching    Dye [Iodides] Hives and Shortness Of Breath    Percocet [Oxycodone-Acetaminophen] Shortness Of Breath and Itching    Keflex [Cephalexin] Itching and Rash    Lasix [Furosemide] Other (See Comments)     Pt states she cramps up and gets headaches    Levaquin [Levofloxacin In D5w] Hives    Lipitor      MUSCLE SPASMS    Lyrica [Pregabalin]      Dream disturbances    Morphine Hives    Naproxen      Unsure of reaction;pt states able to take Aleve without difficulties    Nefazodone Other (See Comments)    Norvasc [Amlodipine] Swelling    Oxycodone-Acetaminophen Swelling    Shellfish-Derived Products     Trazodone And Nefazodone        Family History   Problem Relation Age of Onset    Cancer Mother     Asthma Father        Social History     Socioeconomic History    Marital status:      Spouse name: Not on file    Number of children: 2    Years of education: Not on file    Highest education level: Not on file   Occupational History    Occupation: disability   Social Needs    Financial resource strain: Not on file    Food insecurity     Worry: Not on file     Inability: Not on file   YellowBrck needs     Medical: Not on file     Non-medical: Not on file   Tobacco Use    Smoking status: Current Every Day Smoker     Packs/day: 0.25     Years: 40.00     Pack years: 10.00     Types: Cigarettes     Start date: 8/10/1974    Smokeless tobacco: Never Used    Tobacco comment: 1 pack every 3 days (6-7 cig)   Substance and Sexual Activity    Alcohol use:  Yes     Alcohol/week: 0.0 standard drinks     Comment: social    Drug use: Yes     Types: Marijuana     Comment: \"every 4th or 5th day out of the week\"    Sexual activity: Never   Lifestyle    Physical activity     Days per week: Not on file     Minutes per session: Not on file    Stress: Not on file   Relationships    Social connections     Talks on phone: Not on file     Gets together: Not on file     Attends Yazidi service: Not on file     Active member of club or organization: Not on file     Attends meetings of clubs or organizations: Not on file     Relationship status: Not on file    Intimate partner violence     Fear of current or ex partner: Not on file     Emotionally abused: Not on file     Physically abused: Not on file     Forced sexual activity: Not on file   Other Topics Concern    Not on file   Social History Narrative    Pt lives with brother in her house-pt has never driven a car and depends on transportation       Review of Systems:   CONSTITUTIONAL:  negative for  fevers, chills, fatigue and weight loss   EYES:  negative for  blurred vision, eye discharge and visual disturbance   HEENT:  negative for  hearing loss, tinnitus, ear drainage, earaches, nasal congestion, snoring, sore mouth, sore throat, hoarseness and positive for voice change and epistaxis  RESPIRATORY:  negative for  dry cough, cough with sputum, dyspnea and wheezing   CARDIOVASCULAR:  negative for  chest pain, dyspnea   GASTROINTESTINAL:  negative for dysphagia and reflux   INTEGUMENT/BREAST:  negative for rash and skin lesion(s)   HEMATOLOGIC/LYMPHATIC:  negative for easy bruising, bleeding and lymphadenopathy   ALLERGIC/IMMUNOLOGIC:  negative for recurrent infections, urticaria and angioedema   ENDOCRINE:  negative for heat intolerance, cold intolerance and weight changes   MUSCULOSKELETAL:  negative for  myalgias and arthralgias     Physical Exam:  Vitals:    04/21/21 0328 04/21/21 0618 04/21/21 0745 04/21/21 1100   BP:  (!) 189/100 (!) 142/82    Pulse:   75    Resp: 16  16    Temp:   97.7 °F (36.5 °C)    TempSrc:   Oral    SpO2:   95% 93%   Weight:       Height:           CONSTITUTIONAL:  awake, alert, cooperative, no apparent distress, and appears stated age   EYES:  Lids and lashes normal, pupils equal, round and reactive to light, extra ocular muscles intact, sclera clear  EARS: external ears without lesions, no drainage  NOSE: Nares normal. Septum midline. Mucosa normal. No drainage or sinus tenderness, no epistaxis or active bleeding   MOUTH: Dry mucous membranes, no blood in the posterior oropharynx, no masses, lesions or ulcerations, uvula is midline   THROAT: slightly raspy and stridor absent  NECK:  supple, symmetrical, trachea midline  HEMATOLOGIC/LYMPHATICS:  no cervical lymphadenopathy  LUNGS:  no increased work of breathing and good air exchange  CARDIOVASCULAR:  Normal rate    ABDOMEN: Soft, non-distended  SKIN: Warm, no rashes or edema     LABS:  CBC  Recent Labs     04/21/21 0605   WBC 5.7   HGB 13.7   HCT 43.3        BMP  Recent Labs     04/21/21 0605      K 4.9      CO2 28   BUN 23   CREATININE 0.8   CALCIUM 8.8     Liver Function  Recent Labs     04/19/21 2052   BILITOT 0.4   AST 17   ALT 18   ALKPHOS 69   PROT 7.3   LABALBU 3.5     No results for input(s): LACTATE in the last 72 hours. Recent Labs     04/19/21 2052   INR 1.1       RADIOLOGY    Xr Chest Portable    Result Date: 4/19/2021  EXAMINATION: ONE XRAY VIEW OF THE CHEST 4/19/2021 8:13 pm COMPARISON: 03/12/2021 HISTORY: ORDERING SYSTEM PROVIDED HISTORY: cough, hemoptysis TECHNOLOGIST PROVIDED HISTORY: Reason for exam:->cough, hemoptysis FINDINGS: Possible left upper lobe opacity. .  There is no effusion or pneumothorax. Stable cardiomegaly. The osseous structures are without acute process. Possible left upper lobe opacity. Stable cardiomegaly. Cta Pulmonary W Contrast    Result Date: 4/19/2021  EXAMINATION: CTA OF THE CHEST 4/19/2021 11:44 pm TECHNIQUE: CTA of the chest was performed after the administration of intravenous contrast.  Multiplanar reformatted images are provided for review. MIP images are provided for review.  Dose modulation, iterative reconstruction, and/or weight based adjustment of the mA/kV was utilized to reduce the radiation dose to as low as reasonably achievable. COMPARISON: None. HISTORY: ORDERING SYSTEM PROVIDED HISTORY: ro pe TECHNOLOGIST PROVIDED HISTORY: Reason for exam:->ro pe Decision Support Exception->Emergency Medical Condition (MA) FINDINGS: Pulmonary Arteries: Pulmonary arteries are adequately opacified for evaluation. No evidence of intraluminal filling defect to suggest pulmonary embolism. Main pulmonary artery is normal in caliber. Mediastinum: There are hyperplastic mediastinal lymph nodes. The heart is enlarged. There is no acute abnormality of the thoracic aorta. Lungs/pleura: There are multifocal ground-glass patchy opacities in both lungs. .  No evidence of pleural effusion or pneumothorax. Upper Abdomen: Limited images of the upper abdomen are unremarkable. Soft Tissues/Bones: No acute bone or soft tissue abnormality. No evidence of pulmonary embolism. Multifocal bilateral patchy ground-glass opacities, compatible with atypical or COVID related pneumonia.          Assessment/Plan:  3. 77 y/o F with pneumonia    - CT images reviewed with the patient  - abnormalities likely due to dehydration, cough, and supplemental oxygen and dry air  - recommend increasing hydration, nasal saline irrgation  - no acute surgical intervention at this time  - patient can follow up at her already scheduled appointment on May 6 with flexible laryngoscopy     Patient seen, examined, and plan discussed with  [address] [place] [address]    Electronically signed by Shanel Porter DO on 4/21/2021 at 2:05 PM

## 2021-04-21 NOTE — TELEPHONE ENCOUNTER
MA recieved notice that patient cancelled her Sinus CT and that radiology reached out to her 3 times to reschedule with no response. I phoned patient and was informed by caregiver that patient is currently in the hospital at [facility] but is aware that patient needs to reschedule her CT scan. Phone number has been given to caregiver. Patient still has follow up scheduled with Dr. Everardo Diamond 5/19/21.     Electronically signed by Lynden Harada, [place] on 4/21/21 at 11:36 AM EDT

## 2021-04-21 NOTE — PROGRESS NOTES
[facility] Progress Note    Admitting Date and Time: 4/19/2021  7:47 PM  Admit Dx: Acute respiratory disease due to 2019 novel coronavirus [U07.1, J06.9]    Subjective:  Patient is being followed for Acute respiratory disease due to 2019 novel coronavirus [U07.1, J06.9]   Pt feels tired and pain all over. Still complaining of neck pain, also complained of paraesthesia bilateral hands     ROS: denies fever, chills, cp, n/v, HA unless stated above.       dexamethasone  6 mg Oral Daily    aspirin  81 mg Oral Daily    baclofen  10 mg Oral BID    dilTIAZem  120 mg Oral Daily    DULoxetine  60 mg Oral BID    hydrALAZINE  50 mg Oral 3 times per day    isosorbide mononitrate  30 mg Oral Daily    montelukast  10 mg Oral Nightly    trospium  20 mg Oral BID AC    atorvastatin  10 mg Oral Daily    traZODone  100 mg Oral Nightly    sodium chloride flush  10 mL Intravenous 2 times per day    insulin glargine  50 Units Subcutaneous Nightly    insulin lispro  0.08 Units/kg Subcutaneous TID WC    insulin lispro  0-12 Units Subcutaneous TID WC    insulin lispro  0-6 Units Subcutaneous Nightly    Vitamin D  1,000 Units Oral Daily    vitamin C  1,000 mg Oral BID    zinc sulfate  50 mg Oral BID    ipratropium  2 puff Inhalation 4x daily    remdesivir IVPB  100 mg Intravenous Q24H    carvedilol  12.5 mg Oral BID WC    pantoprazole  40 mg Oral QAM AC     glucose, 15 g, PRN  dextrose, 12.5 g, PRN  glucagon (rDNA), 1 mg, PRN  dextrose, 100 mL/hr, PRN  sodium chloride flush, 10 mL, PRN  sodium chloride, 25 mL, PRN  polyethylene glycol, 17 g, Daily PRN  acetaminophen, 650 mg, Q6H PRN    Or  acetaminophen, 650 mg, Q6H PRN  albuterol sulfate HFA, 2 puff, Q6H PRN  sodium chloride, 30 mL, PRN  hydrALAZINE, 10 mg, Q4H PRN  HYDROcodone 5 mg - acetaminophen, 1 tablet, Q6H PRN         Objective:    BP (!) 142/82   Pulse 75   Temp 97.7 °F (36.5 °C) (Oral)   Resp 16   Ht 5' 2\" (1.575 m)   Wt 221 lb (100.2 kg)   LMP 01/01/1990   SpO2 95%   BMI 40.42 kg/m²     General Appearance: alert and oriented to person, place and time and in no acute distress  Skin: warm and dry  Head: normocephalic and atraumatic, bilateral neck lumps  Eyes: pupils equal, round, and reactive to light, extraocular eye movements intact, conjunctivae normal  Neck: neck supple and non tender without mass   Pulmonary/Chest: clear to auscultation bilaterally- no wheezes, rales or rhonchi, normal air movement, no respiratory distress  Cardiovascular: normal rate, normal S1 and S2 and no carotid bruits  Abdomen: soft, non-tender, non-distended, normal bowel sounds, no masses or organomegaly  Extremities: no cyanosis, no clubbing and no edema  Neurologic: no cranial nerve deficit and speech normal        Recent Labs     04/19/21 2052 04/20/21 0320 04/21/21  0605    135 134   K 5.8* 4.3 4.9    100 100   CO2 28 25 28   BUN 24* 21 23   CREATININE 1.0 0.9 0.8   GLUCOSE 221* 252* 249*   CALCIUM 9.0 8.4* 8.8       Recent Labs     04/19/21 2052 04/20/21 0320 04/21/21  0605   WBC 4.6 4.3* 5.7   RBC 4.29 4.09 4.41   HGB 13.3 12.7 13.7   HCT 41.5 40.1 43.3   MCV 96.7 98.0 98.2   MCH 31.0 31.1 31.1   MCHC 32.0 31.7* 31.6*   RDW 12.6 12.6 12.7    176 209   MPV 10.7 10.5 11.0       Assessment:    Active Problems:    Acute respiratory disease due to 2019 novel coronavirus  Resolved Problems:    * No resolved hospital problems. *      Plan:  1. Acute hypoxic respiratory failure, O2 sat was in the 80s on admission, now requiring 2 L O2, due to COVID-19 pneumonia, will treat underlying process and wean off oxygen. CTA ruled out PE  2.  COVID-19 pneumonia, start the patient on Decadron and remdesivir, monitor inflammatory markers. 3.  Hypertension, not controlled continue on Coreg and diltiazem, better readings for the blood pressure will adjust medication. 4.  History of diabetes type 2, continue on Lantus and sliding scale insulin  5. History of asthma and COPD, probably mild decompensation, continue on steroids bronchodilators, consider consulting pulmonary. 6.  Hx of CAD, continue on asa, stating and BB,   7. Sinus pause 3.5 sec, decrease coreg to 12.5 mg bid  8. Neck swelling, obtain a CT neck soft tissue, showed thickening of the soft palate and tongue lesion, consulting ENT, might need also gen surgery on board    NOTE: This report was transcribed using voice recognition software. Every effort was made to ensure accuracy; however, inadvertent computerized transcription errors may be present.   Electronically signed by Sarah Horan MD on 4/21/2021 at 10:02 AM

## 2021-04-21 NOTE — CARE COORDINATION
4/21/2021  Social Work Discharge Planning:COVID POS. 4/19. SW spoke to Pt per her request. Pt asked if someone could help her renew her medicaid. She received a letter but it is not here at the hospital. Sw called [facility] who said renewing is only a phone call and will go much smoother and easier if Pt calls the number on the letter she received. Sw informed Pt and  confirmed with Pt that she will discharge home with [facility] and a nebulizer via [facility]. Will need a HHC order and DME order with chart note.  Electronically signed by SINDHU Vera on 4/21/2021 at 12:25 PM

## 2021-04-21 NOTE — PLAN OF CARE
Problem: Airway Clearance - Ineffective  Goal: Achieve or maintain patent airway  Outcome: Met This Shift     Problem:  Body Temperature -  Risk of, Imbalanced  Goal: Will regain or maintain usual level of consciousness  Outcome: Met This Shift     Problem: Falls - Risk of:  Goal: Will remain free from falls  Description: Will remain free from falls  Outcome: Met This Shift

## 2021-04-22 LAB
ANION GAP SERPL CALCULATED.3IONS-SCNC: 7 MMOL/L (ref 7–16)
ATYPICAL LYMPHOCYTE RELATIVE PERCENT: 0.9 % (ref 0–4)
BASOPHILS ABSOLUTE: 0 E9/L (ref 0–0.2)
BASOPHILS RELATIVE PERCENT: 0 % (ref 0–2)
BUN BLDV-MCNC: 21 MG/DL (ref 6–23)
CALCIUM SERPL-MCNC: 9.6 MG/DL (ref 8.6–10.2)
CHLORIDE BLD-SCNC: 99 MMOL/L (ref 98–107)
CO2: 32 MMOL/L (ref 22–29)
CREAT SERPL-MCNC: 0.9 MG/DL (ref 0.5–1)
EOSINOPHILS ABSOLUTE: 0 E9/L (ref 0.05–0.5)
EOSINOPHILS RELATIVE PERCENT: 0 % (ref 0–6)
GFR AFRICAN AMERICAN: >60
GFR NON-AFRICAN AMERICAN: >60 ML/MIN/1.73
GLUCOSE BLD-MCNC: 132 MG/DL (ref 74–99)
HCT VFR BLD CALC: 45.4 % (ref 34–48)
HEMOGLOBIN: 14.2 G/DL (ref 11.5–15.5)
LYMPHOCYTES ABSOLUTE: 0.51 E9/L (ref 1.5–4)
LYMPHOCYTES RELATIVE PERCENT: 4.4 % (ref 20–42)
MCH RBC QN AUTO: 31 PG (ref 26–35)
MCHC RBC AUTO-ENTMCNC: 31.3 % (ref 32–34.5)
MCV RBC AUTO: 99.1 FL (ref 80–99.9)
METER GLUCOSE: 160 MG/DL (ref 74–99)
METER GLUCOSE: 287 MG/DL (ref 74–99)
METER GLUCOSE: 294 MG/DL (ref 74–99)
METER GLUCOSE: 85 MG/DL (ref 74–99)
MONOCYTES ABSOLUTE: 0.2 E9/L (ref 0.1–0.95)
MONOCYTES RELATIVE PERCENT: 1.7 % (ref 2–12)
NEUTROPHILS ABSOLUTE: 9.39 E9/L (ref 1.8–7.3)
NEUTROPHILS RELATIVE PERCENT: 93 % (ref 43–80)
NUCLEATED RED BLOOD CELLS: 0 /100 WBC
PDW BLD-RTO: 12.5 FL (ref 11.5–15)
PLATELET # BLD: 290 E9/L (ref 130–450)
PMV BLD AUTO: 10.9 FL (ref 7–12)
POTASSIUM REFLEX MAGNESIUM: 4.6 MMOL/L (ref 3.5–5)
RBC # BLD: 4.58 E12/L (ref 3.5–5.5)
RBC # BLD: NORMAL 10*6/UL
SODIUM BLD-SCNC: 138 MMOL/L (ref 132–146)
WBC # BLD: 10.1 E9/L (ref 4.5–11.5)

## 2021-04-22 PROCEDURE — 2500000003 HC RX 250 WO HCPCS: Performed by: INTERNAL MEDICINE

## 2021-04-22 PROCEDURE — 99233 SBSQ HOSP IP/OBS HIGH 50: CPT | Performed by: INTERNAL MEDICINE

## 2021-04-22 PROCEDURE — 6370000000 HC RX 637 (ALT 250 FOR IP): Performed by: INTERNAL MEDICINE

## 2021-04-22 PROCEDURE — 94660 CPAP INITIATION&MGMT: CPT

## 2021-04-22 PROCEDURE — 2060000000 HC ICU INTERMEDIATE R&B

## 2021-04-22 PROCEDURE — 82962 GLUCOSE BLOOD TEST: CPT

## 2021-04-22 PROCEDURE — 2700000000 HC OXYGEN THERAPY PER DAY

## 2021-04-22 PROCEDURE — 36415 COLL VENOUS BLD VENIPUNCTURE: CPT

## 2021-04-22 PROCEDURE — 6360000002 HC RX W HCPCS: Performed by: NURSE PRACTITIONER

## 2021-04-22 PROCEDURE — 2580000003 HC RX 258: Performed by: INTERNAL MEDICINE

## 2021-04-22 PROCEDURE — 80048 BASIC METABOLIC PNL TOTAL CA: CPT

## 2021-04-22 PROCEDURE — 85025 COMPLETE CBC W/AUTO DIFF WBC: CPT

## 2021-04-22 RX ORDER — OLOPATADINE HYDROCHLORIDE 2 MG/ML
1 SOLUTION/ DROPS OPHTHALMIC 2 TIMES DAILY
Status: DISCONTINUED | OUTPATIENT
Start: 2021-04-22 | End: 2021-04-22 | Stop reason: CLARIF

## 2021-04-22 RX ORDER — KETOTIFEN FUMARATE 0.35 MG/ML
1 SOLUTION/ DROPS OPHTHALMIC 2 TIMES DAILY
Status: DISCONTINUED | OUTPATIENT
Start: 2021-04-22 | End: 2021-04-24 | Stop reason: HOSPADM

## 2021-04-22 RX ORDER — DEXAMETHASONE 4 MG/1
4 TABLET ORAL DAILY
Status: DISCONTINUED | OUTPATIENT
Start: 2021-04-23 | End: 2021-04-24 | Stop reason: HOSPADM

## 2021-04-22 RX ADMIN — ZINC SULFATE 220 MG (50 MG) CAPSULE 50 MG: CAPSULE at 20:43

## 2021-04-22 RX ADMIN — INSULIN GLARGINE 50 UNITS: 100 INJECTION, SOLUTION SUBCUTANEOUS at 20:48

## 2021-04-22 RX ADMIN — MONTELUKAST SODIUM 10 MG: 10 TABLET, FILM COATED ORAL at 20:44

## 2021-04-22 RX ADMIN — HYDROCODONE BITARTRATE AND ACETAMINOPHEN 1 TABLET: 5; 325 TABLET ORAL at 13:20

## 2021-04-22 RX ADMIN — BACLOFEN 10 MG: 10 TABLET ORAL at 08:05

## 2021-04-22 RX ADMIN — TROSPIUM CHLORIDE 20 MG: 20 TABLET, FILM COATED ORAL at 16:54

## 2021-04-22 RX ADMIN — GABAPENTIN 100 MG: 100 CAPSULE ORAL at 20:43

## 2021-04-22 RX ADMIN — IPRATROPIUM BROMIDE 2 PUFF: 17 AEROSOL, METERED RESPIRATORY (INHALATION) at 12:05

## 2021-04-22 RX ADMIN — OXYCODONE HYDROCHLORIDE AND ACETAMINOPHEN 1000 MG: 500 TABLET ORAL at 08:05

## 2021-04-22 RX ADMIN — PANTOPRAZOLE SODIUM 40 MG: 40 TABLET, DELAYED RELEASE ORAL at 05:54

## 2021-04-22 RX ADMIN — CARVEDILOL 12.5 MG: 6.25 TABLET, FILM COATED ORAL at 16:54

## 2021-04-22 RX ADMIN — ZINC SULFATE 220 MG (50 MG) CAPSULE 50 MG: CAPSULE at 08:05

## 2021-04-22 RX ADMIN — DULOXETINE HYDROCHLORIDE 60 MG: 60 CAPSULE, DELAYED RELEASE ORAL at 08:05

## 2021-04-22 RX ADMIN — INSULIN LISPRO 6 UNITS: 100 INJECTION, SOLUTION INTRAVENOUS; SUBCUTANEOUS at 08:53

## 2021-04-22 RX ADMIN — DEXAMETHASONE 6 MG: 4 TABLET ORAL at 12:04

## 2021-04-22 RX ADMIN — GABAPENTIN 100 MG: 100 CAPSULE ORAL at 08:05

## 2021-04-22 RX ADMIN — ASPIRIN 81 MG: 81 TABLET, CHEWABLE ORAL at 08:05

## 2021-04-22 RX ADMIN — HYDROCODONE BITARTRATE AND ACETAMINOPHEN 1 TABLET: 5; 325 TABLET ORAL at 20:44

## 2021-04-22 RX ADMIN — ATORVASTATIN CALCIUM 10 MG: 10 TABLET, FILM COATED ORAL at 08:05

## 2021-04-22 RX ADMIN — BACLOFEN 10 MG: 10 TABLET ORAL at 20:43

## 2021-04-22 RX ADMIN — TROSPIUM CHLORIDE 20 MG: 20 TABLET, FILM COATED ORAL at 05:54

## 2021-04-22 RX ADMIN — CARVEDILOL 12.5 MG: 6.25 TABLET, FILM COATED ORAL at 08:05

## 2021-04-22 RX ADMIN — GABAPENTIN 100 MG: 100 CAPSULE ORAL at 13:20

## 2021-04-22 RX ADMIN — DULOXETINE HYDROCHLORIDE 60 MG: 60 CAPSULE, DELAYED RELEASE ORAL at 20:44

## 2021-04-22 RX ADMIN — IPRATROPIUM BROMIDE 2 PUFF: 17 AEROSOL, METERED RESPIRATORY (INHALATION) at 20:44

## 2021-04-22 RX ADMIN — INSULIN LISPRO 8 UNITS: 100 INJECTION, SOLUTION INTRAVENOUS; SUBCUTANEOUS at 08:54

## 2021-04-22 RX ADMIN — IPRATROPIUM BROMIDE 2 PUFF: 17 AEROSOL, METERED RESPIRATORY (INHALATION) at 16:54

## 2021-04-22 RX ADMIN — TRAZODONE HYDROCHLORIDE 100 MG: 50 TABLET ORAL at 20:43

## 2021-04-22 RX ADMIN — Medication 1000 UNITS: at 08:05

## 2021-04-22 RX ADMIN — INSULIN LISPRO 3 UNITS: 100 INJECTION, SOLUTION INTRAVENOUS; SUBCUTANEOUS at 20:48

## 2021-04-22 RX ADMIN — KETOTIFEN FUMARATE 1 DROP: 0.35 SOLUTION/ DROPS OPHTHALMIC at 12:04

## 2021-04-22 RX ADMIN — INSULIN LISPRO 2 UNITS: 100 INJECTION, SOLUTION INTRAVENOUS; SUBCUTANEOUS at 12:24

## 2021-04-22 RX ADMIN — REMDESIVIR 100 MG: 100 INJECTION, POWDER, LYOPHILIZED, FOR SOLUTION INTRAVENOUS at 21:00

## 2021-04-22 RX ADMIN — INSULIN LISPRO 8 UNITS: 100 INJECTION, SOLUTION INTRAVENOUS; SUBCUTANEOUS at 12:23

## 2021-04-22 RX ADMIN — ISOSORBIDE MONONITRATE 30 MG: 30 TABLET, EXTENDED RELEASE ORAL at 08:09

## 2021-04-22 RX ADMIN — HYDRALAZINE HYDROCHLORIDE 50 MG: 50 TABLET ORAL at 20:44

## 2021-04-22 RX ADMIN — HYDRALAZINE HYDROCHLORIDE 50 MG: 50 TABLET ORAL at 05:54

## 2021-04-22 RX ADMIN — OXYCODONE HYDROCHLORIDE AND ACETAMINOPHEN 1000 MG: 500 TABLET ORAL at 20:44

## 2021-04-22 RX ADMIN — KETOTIFEN FUMARATE 1 DROP: 0.35 SOLUTION/ DROPS OPHTHALMIC at 20:44

## 2021-04-22 RX ADMIN — SODIUM CHLORIDE, PRESERVATIVE FREE 10 ML: 5 INJECTION INTRAVENOUS at 08:05

## 2021-04-22 RX ADMIN — DILTIAZEM HYDROCHLORIDE 120 MG: 30 TABLET, FILM COATED ORAL at 08:05

## 2021-04-22 RX ADMIN — ALBUTEROL SULFATE 2 PUFF: 108 AEROSOL, METERED RESPIRATORY (INHALATION) at 12:04

## 2021-04-22 RX ADMIN — IPRATROPIUM BROMIDE 2 PUFF: 17 AEROSOL, METERED RESPIRATORY (INHALATION) at 08:05

## 2021-04-22 RX ADMIN — HYDROCODONE BITARTRATE AND ACETAMINOPHEN 1 TABLET: 5; 325 TABLET ORAL at 05:54

## 2021-04-22 RX ADMIN — HYDRALAZINE HYDROCHLORIDE 50 MG: 50 TABLET ORAL at 13:21

## 2021-04-22 RX ADMIN — SODIUM CHLORIDE, PRESERVATIVE FREE 10 ML: 5 INJECTION INTRAVENOUS at 20:44

## 2021-04-22 ASSESSMENT — PAIN DESCRIPTION - PROGRESSION: CLINICAL_PROGRESSION: GRADUALLY WORSENING

## 2021-04-22 ASSESSMENT — PAIN SCALES - GENERAL
PAINLEVEL_OUTOF10: 10
PAINLEVEL_OUTOF10: 8

## 2021-04-22 ASSESSMENT — PAIN DESCRIPTION - DESCRIPTORS
DESCRIPTORS: DISCOMFORT;BURNING;CONSTANT
DESCRIPTORS: BURNING

## 2021-04-22 ASSESSMENT — PAIN DESCRIPTION - ONSET
ONSET: ON-GOING
ONSET: ON-GOING

## 2021-04-22 ASSESSMENT — PAIN DESCRIPTION - FREQUENCY: FREQUENCY: CONTINUOUS

## 2021-04-22 NOTE — PROGRESS NOTES
P Quality Flow/Interdisciplinary Rounds Progress Note        Quality Flow Rounds held on April 22, 2021    Disciplines Attending:  Bedside Nurse, ,  and Nursing Unit Leadership    Carly Chapman was admitted on 4/19/2021  7:47 PM    Anticipated Discharge Date:  Expected Discharge Date: 04/25/21    Disposition:    Jadon Score:  Jadon Scale Score: 23    Readmission Risk              Risk of Unplanned Readmission:        27           Discussed patient goal for the day, patient clinical progression, and barriers to discharge. The following Goal(s) of the Day/Commitment(s) have been identified:  Remdesivir Day 4, wean O2.        Linnette Chance  April 22, 2021

## 2021-04-22 NOTE — PROGRESS NOTES
[facility] Hospital[facility]   Progress Note    Admitting Date and Time: 4/19/2021  7:47 PM  Admit Dx: Acute respiratory disease due to 2019 novel coronavirus [U07.1, J06.9]    Subjective:    Patient was admitted with Acute respiratory disease due to 2019 novel coronavirus [U07.1, J06.9]. Patient denies fever, chills, cp, sob, n/v. Pt c/o nasal drainage and sputum. Pt c/o dry mouth and nose.       ketotifen  1 drop Both Eyes BID    [START ON 4/23/2021] dexamethasone  4 mg Oral Daily    gabapentin  100 mg Oral TID    aspirin  81 mg Oral Daily    baclofen  10 mg Oral BID    dilTIAZem  120 mg Oral Daily    DULoxetine  60 mg Oral BID    hydrALAZINE  50 mg Oral 3 times per day    isosorbide mononitrate  30 mg Oral Daily    montelukast  10 mg Oral Nightly    trospium  20 mg Oral BID AC    atorvastatin  10 mg Oral Daily    traZODone  100 mg Oral Nightly    sodium chloride flush  10 mL Intravenous 2 times per day    insulin glargine  50 Units Subcutaneous Nightly    insulin lispro  0.08 Units/kg Subcutaneous TID WC    insulin lispro  0-12 Units Subcutaneous TID WC    insulin lispro  0-6 Units Subcutaneous Nightly    Vitamin D  1,000 Units Oral Daily    vitamin C  1,000 mg Oral BID    zinc sulfate  50 mg Oral BID    ipratropium  2 puff Inhalation 4x daily    remdesivir IVPB  100 mg Intravenous Q24H    carvedilol  12.5 mg Oral BID WC    pantoprazole  40 mg Oral QAM AC     glucose, 15 g, PRN  dextrose, 12.5 g, PRN  glucagon (rDNA), 1 mg, PRN  dextrose, 100 mL/hr, PRN  sodium chloride flush, 10 mL, PRN  sodium chloride, 25 mL, PRN  polyethylene glycol, 17 g, Daily PRN  acetaminophen, 650 mg, Q6H PRN    Or  acetaminophen, 650 mg, Q6H PRN  albuterol sulfate HFA, 2 puff, Q6H PRN  sodium chloride, 30 mL, PRN  hydrALAZINE, 10 mg, Q4H PRN  HYDROcodone 5 mg - acetaminophen, 1 tablet, Q6H PRN         Objective:          PHYSICAL EXAM:    Vitals:  BP (!) 145/78   Pulse 99   Temp 98 °F (36.7 °C) (Oral)   Resp 20   Ht 5' 2\" (1.575 m)   Wt 221 lb (100.2 kg)   LMP 01/01/1990   SpO2 94%   BMI 40.42 kg/m²     General:  Appears comfortable. Answers questions appropriately and cooperative with exam  HEENT:  Mucous membranes moist. No erythema, rhinorrhea, or post-nasal drip noted. Neck:  No carotid bruits. Heart:  Rhythm regular at rate of 98  Lungs:  CTA. No wheeze, rales, or rhonchi  Abdomen:  Positive bowel sounds positive. Soft. Non-tender. No guarding, rebound or rigidity. Breast/Rectal/Genitourinary: not pertinent. Extremities:  Negative for lower extremity edema  Skin:  Warm and dry  Vascular: 2/4 Dorsalis Pedis pulses bilaterally. Neuro:  Cranial nerves 2-12 grossly intact, no focal weakness or change in sensation noted. Extraocular muscles intact. Pupils equal, round, reactive to light.               Recent Labs     04/20/21  0320 04/21/21  0605 04/22/21  1110    134 138   K 4.3 4.9 4.6    100 99   CO2 25 28 32*   BUN 21 23 21   CREATININE 0.9 0.8 0.9   GLUCOSE 252* 249* 132*   CALCIUM 8.4* 8.8 9.6       Recent Labs     04/20/21  0320 04/21/21  0605 04/22/21  1110   WBC 4.3* 5.7 10.1   RBC 4.09 4.41 4.58   HGB 12.7 13.7 14.2   HCT 40.1 43.3 45.4   MCV 98.0 98.2 99.1   MCH 31.1 31.1 31.0   MCHC 31.7* 31.6* 31.3*   RDW 12.6 12.7 12.5    209 290   MPV 10.5 11.0 10.9       CBC with Differential:    Lab Results   Component Value Date    WBC 10.1 04/22/2021    RBC 4.58 04/22/2021    HGB 14.2 04/22/2021    HCT 45.4 04/22/2021     04/22/2021    MCV 99.1 04/22/2021    MCH 31.0 04/22/2021    MCHC 31.3 04/22/2021    RDW 12.5 04/22/2021    NRBC 0.0 04/22/2021    SEGSPCT 84 03/03/2014    LYMPHOPCT 4.4 04/22/2021    MONOPCT 1.7 04/22/2021    BASOPCT 0.0 04/22/2021    MONOSABS 0.20 04/22/2021    LYMPHSABS 0.51 04/22/2021    EOSABS 0.00 04/22/2021    BASOSABS 0.00 04/22/2021     BMP:    Lab Results   Component Value Date     04/22/2021    K 4.6 04/22/2021    CL 99 04/22/2021    CO2 32 04/22/2021    BUN 21 04/22/2021    LABALBU 3.5 04/19/2021    LABALBU 5.2 12/01/2011    CREATININE 0.9 04/22/2021    CALCIUM 9.6 04/22/2021    GFRAA >60 04/22/2021    LABGLOM >60 04/22/2021    GLUCOSE 132 04/22/2021    GLUCOSE 181 12/01/2011        Radiology:   CT SOFT TISSUE NECK W CONTRAST   Final Result   Diffuse oropharyngeal wall thickening with thickening of the soft palate,   likely related to infectious/inflammatory etiology. Nonspecific small 7 x 5 mm hyperenhancing rounding lesion at the central base   of tongue. This finding was not identified on CT C-spine January 9, 2019 to   suggest ectopic thyroid tissue. Correlation with direct visual   inspection/ENT consultation is recommended. CTA PULMONARY W CONTRAST   Final Result   No evidence of pulmonary embolism. Multifocal bilateral patchy ground-glass opacities, compatible with atypical   or COVID related pneumonia. XR CHEST PORTABLE   Final Result   Possible left upper lobe opacity. Stable cardiomegaly. Assessment:    Active Problems:    Acute respiratory disease due to 2019 novel coronavirus  Resolved Problems:    * No resolved hospital problems. *      Plan:  1. Acute respiratory failure with hypoxia adjust o2  Encourage IS. 2. Pneumonia due to covid remdesivir and steroids  Encourage activity  3. htn continue meds(coreg adjusted)  4. Dm type 2 controlled monitor bs and tx with insulin  5. Asthma/copd continue meds  6. abnormal ct scan ENT seen and will reevaluate as outpt    Symptoms appear to be related due to dry mucous membranes and will humidify o2 and encourage po fluids. Consider iv fluids if needed.      Discussed extensively pt about goals of care and about hospital course    Chart reviewed and updated by nursing    Time spent is 35 min    Electronically signed by Kingsley Carroll DO on 4/22/2021 at 6:57 PM

## 2021-04-22 NOTE — PROGRESS NOTES
Pulmonary Progress Note    Admit Date: 4/19/2021    Requesting Physician: Judith 5, DO    SUBJECTIVE:   Tolerating bipap and O2, however, continues to have dry mouth. Difficulty carrying on conversation due to hoarse voice. Off O2 presently with pox 91%    Medications:     dextrose      sodium chloride        dexamethasone  6 mg Oral Daily    gabapentin  100 mg Oral TID    aspirin  81 mg Oral Daily    baclofen  10 mg Oral BID    dilTIAZem  120 mg Oral Daily    DULoxetine  60 mg Oral BID    hydrALAZINE  50 mg Oral 3 times per day    isosorbide mononitrate  30 mg Oral Daily    montelukast  10 mg Oral Nightly    trospium  20 mg Oral BID AC    atorvastatin  10 mg Oral Daily    traZODone  100 mg Oral Nightly    sodium chloride flush  10 mL Intravenous 2 times per day    insulin glargine  50 Units Subcutaneous Nightly    insulin lispro  0.08 Units/kg Subcutaneous TID WC    insulin lispro  0-12 Units Subcutaneous TID WC    insulin lispro  0-6 Units Subcutaneous Nightly    Vitamin D  1,000 Units Oral Daily    vitamin C  1,000 mg Oral BID    zinc sulfate  50 mg Oral BID    ipratropium  2 puff Inhalation 4x daily    remdesivir IVPB  100 mg Intravenous Q24H    carvedilol  12.5 mg Oral BID WC    pantoprazole  40 mg Oral QAM AC        Respiratory ROS:  cough, no shortness of breath, or wheezing  Otherwise, a complete review of systems is undertaken and is negative. Physical Examination    Vitals:  VITALS:  BP (!) 187/98   Pulse 94   Temp 98 °F (36.7 °C) (Oral)   Resp 18   Ht 5' 2\" (1.575 m)   Wt 221 lb (100.2 kg)   LMP 01/01/1990   SpO2 94%   BMI 40.42 kg/m²   24HR INTAKE/OUTPUT:      Intake/Output Summary (Last 24 hours) at 4/22/2021 0807  Last data filed at 4/21/2021 2203  Gross per 24 hour   Intake 480 ml   Output 1500 ml   Net -1020 ml     Saturating 93% on room air    General: No distress. Alert.,  Morbidly obese  ENT: No discharge. Pharynx clear. Nasal septum midline   Neck: Trachea midline. Normal thyroid. no JVD  Resp: No accessory muscle use. No wheezing. No rhonchi. Symmetrical exansion  CV:  Regular rate. Regular rhythm. No mumur or rub. ABD: Non-tender. Non-distended. Skin: Warm and dry. No rash on exposed extremities. Ext: No joint deformity. No clubbing. No cyanosis. No edema  Neuro: Awake. Follows commands. No focal deficits. Moves all ext     Lab Results:    CBC:   Recent Labs     04/19/21 2052 04/20/21 0320 04/21/21  0605   WBC 4.6 4.3* 5.7   HGB 13.3 12.7 13.7   HCT 41.5 40.1 43.3   MCV 96.7 98.0 98.2    176 209     BMP:   Recent Labs     04/19/21 2052 04/20/21 0320 04/21/21  0605    135 134   K 5.8* 4.3 4.9    100 100   CO2 28 25 28   BUN 24* 21 23   CREATININE 1.0 0.9 0.8      ALB:3,BILIDIR:3,BILITOT:3,ALKPHOS:3)@  PT/INR:   Recent Labs     04/19/21 2052   PROTIME 12.6*   INR 1.1       Cultures:  -    Films:  CXR 4/19 left upper lobe opacity    CT 4/19 CT chest groundglass opacities    Assessment/Plan:   76 y.o. female who presented with cough, sore throat  4/19 CT scan groundglass opacities CT showed swelling of soft palate lesion at the base of tongue  Covid positive rapid  4/20 blood pressure 200/45   proBNP 1179  4/21 2L NC, wore BIPAP all night  4/21 CT chest 7 x 5 mm lesion central base of tongue  4/22 room air 91%      1. Covid pneumonitis  2. Acute hypoxic respiratory failure  resolved  3. Asthma/COPD/Severe restrictive lung disease with positive methacholine challenge with mild intermittent asthma  4. Chronic hypercapnic respiratory failure on BiPAP daily at bedtime  5. DAYAN on BiPAP 27/23 daily at bedtime  6. Component diastolic heart failure  7. Severe cardiomyopathy with the EF 35% on echo from 1/4/2017. Repeat echo 6/5/2018 EF 63% stage II diastolic dysfunction on Entresto  8. Ischemic cardiomyopathy s/p CABG t/non-ST MI 6/2018  9. Chronic RBBB   10. Morbid obesity BMI 40  11.  Nicotine addiction with smoking 12 5 days ago  12. Chronic pain syndrome  13. Chronic constipation  14. Recent admission 3/12/2021 with hypertensive urgency       PLAN:  · wean to keep pox >90%. Not on home O2. · Dexamethasone to oral daily on 4/21  · Continue Remdesivir day #3  · Component of diastolic heart failure would benefit from diuretics  · Follow inflammatory markers  · Continue Zinc, Vit D, Vit C  · Encourage IS  · For follow up with ENT May 6th with flex layngoscopy for soft palate swelling      Electronically signed by PATRIZIA Singh CNP on 4/22/2021 at 8:07 AM   I have personally participated in the history, exam, medical decision making with the NP on the date of service and I agree with all of the pertinent clinical information unless otherwise noted. I have also reviewed and agree with the past medical, family, and social history unless otherwise noted.      patient now on room air   finish course of  Remdesivir   wean steroids

## 2021-04-23 LAB
ANION GAP SERPL CALCULATED.3IONS-SCNC: 8 MMOL/L (ref 7–16)
BASOPHILS ABSOLUTE: 0 E9/L (ref 0–0.2)
BASOPHILS RELATIVE PERCENT: 0 % (ref 0–2)
BUN BLDV-MCNC: 26 MG/DL (ref 6–23)
CALCIUM SERPL-MCNC: 8.9 MG/DL (ref 8.6–10.2)
CHLORIDE BLD-SCNC: 97 MMOL/L (ref 98–107)
CO2: 30 MMOL/L (ref 22–29)
CREAT SERPL-MCNC: 1 MG/DL (ref 0.5–1)
EOSINOPHILS ABSOLUTE: 0 E9/L (ref 0.05–0.5)
EOSINOPHILS RELATIVE PERCENT: 0 % (ref 0–6)
GFR AFRICAN AMERICAN: >60
GFR NON-AFRICAN AMERICAN: >60 ML/MIN/1.73
GLUCOSE BLD-MCNC: 234 MG/DL (ref 74–99)
HCT VFR BLD CALC: 44.1 % (ref 34–48)
HEMOGLOBIN: 14 G/DL (ref 11.5–15.5)
IMMATURE GRANULOCYTES #: 0.03 E9/L
IMMATURE GRANULOCYTES %: 0.4 % (ref 0–5)
LYMPHOCYTES ABSOLUTE: 0.57 E9/L (ref 1.5–4)
LYMPHOCYTES RELATIVE PERCENT: 7.5 % (ref 20–42)
MCH RBC QN AUTO: 31 PG (ref 26–35)
MCHC RBC AUTO-ENTMCNC: 31.7 % (ref 32–34.5)
MCV RBC AUTO: 97.8 FL (ref 80–99.9)
METER GLUCOSE: 132 MG/DL (ref 74–99)
METER GLUCOSE: 212 MG/DL (ref 74–99)
METER GLUCOSE: 254 MG/DL (ref 74–99)
METER GLUCOSE: 258 MG/DL (ref 74–99)
MONOCYTES ABSOLUTE: 0.28 E9/L (ref 0.1–0.95)
MONOCYTES RELATIVE PERCENT: 3.7 % (ref 2–12)
NEUTROPHILS ABSOLUTE: 6.68 E9/L (ref 1.8–7.3)
NEUTROPHILS RELATIVE PERCENT: 88.4 % (ref 43–80)
PDW BLD-RTO: 12.4 FL (ref 11.5–15)
PLATELET # BLD: 265 E9/L (ref 130–450)
PMV BLD AUTO: 10.8 FL (ref 7–12)
POTASSIUM REFLEX MAGNESIUM: 4.7 MMOL/L (ref 3.5–5)
RBC # BLD: 4.51 E12/L (ref 3.5–5.5)
RBC # BLD: NORMAL 10*6/UL
SODIUM BLD-SCNC: 135 MMOL/L (ref 132–146)
WBC # BLD: 7.6 E9/L (ref 4.5–11.5)

## 2021-04-23 PROCEDURE — 6370000000 HC RX 637 (ALT 250 FOR IP): Performed by: NURSE PRACTITIONER

## 2021-04-23 PROCEDURE — 2700000000 HC OXYGEN THERAPY PER DAY

## 2021-04-23 PROCEDURE — 2060000000 HC ICU INTERMEDIATE R&B

## 2021-04-23 PROCEDURE — 94660 CPAP INITIATION&MGMT: CPT

## 2021-04-23 PROCEDURE — 2580000003 HC RX 258: Performed by: INTERNAL MEDICINE

## 2021-04-23 PROCEDURE — 36415 COLL VENOUS BLD VENIPUNCTURE: CPT

## 2021-04-23 PROCEDURE — 2500000003 HC RX 250 WO HCPCS: Performed by: INTERNAL MEDICINE

## 2021-04-23 PROCEDURE — 80048 BASIC METABOLIC PNL TOTAL CA: CPT

## 2021-04-23 PROCEDURE — 6370000000 HC RX 637 (ALT 250 FOR IP): Performed by: INTERNAL MEDICINE

## 2021-04-23 PROCEDURE — 99232 SBSQ HOSP IP/OBS MODERATE 35: CPT | Performed by: INTERNAL MEDICINE

## 2021-04-23 PROCEDURE — 6360000002 HC RX W HCPCS: Performed by: INTERNAL MEDICINE

## 2021-04-23 PROCEDURE — 85025 COMPLETE CBC W/AUTO DIFF WBC: CPT

## 2021-04-23 PROCEDURE — 82962 GLUCOSE BLOOD TEST: CPT

## 2021-04-23 RX ADMIN — HYDROCODONE BITARTRATE AND ACETAMINOPHEN 1 TABLET: 5; 325 TABLET ORAL at 20:33

## 2021-04-23 RX ADMIN — SALINE NASAL SPRAY 1 SPRAY: 1.5 SOLUTION NASAL at 20:34

## 2021-04-23 RX ADMIN — ZINC SULFATE 220 MG (50 MG) CAPSULE 50 MG: CAPSULE at 08:45

## 2021-04-23 RX ADMIN — TRAZODONE HYDROCHLORIDE 100 MG: 50 TABLET ORAL at 20:33

## 2021-04-23 RX ADMIN — ASPIRIN 81 MG: 81 TABLET, CHEWABLE ORAL at 08:44

## 2021-04-23 RX ADMIN — INSULIN LISPRO 8 UNITS: 100 INJECTION, SOLUTION INTRAVENOUS; SUBCUTANEOUS at 17:24

## 2021-04-23 RX ADMIN — INSULIN LISPRO 3 UNITS: 100 INJECTION, SOLUTION INTRAVENOUS; SUBCUTANEOUS at 20:38

## 2021-04-23 RX ADMIN — KETOTIFEN FUMARATE 1 DROP: 0.35 SOLUTION/ DROPS OPHTHALMIC at 20:34

## 2021-04-23 RX ADMIN — CARVEDILOL 12.5 MG: 6.25 TABLET, FILM COATED ORAL at 08:45

## 2021-04-23 RX ADMIN — REMDESIVIR 100 MG: 100 INJECTION, POWDER, LYOPHILIZED, FOR SOLUTION INTRAVENOUS at 20:35

## 2021-04-23 RX ADMIN — DEXAMETHASONE 4 MG: 4 TABLET ORAL at 11:33

## 2021-04-23 RX ADMIN — IPRATROPIUM BROMIDE 2 PUFF: 17 AEROSOL, METERED RESPIRATORY (INHALATION) at 08:43

## 2021-04-23 RX ADMIN — DILTIAZEM HYDROCHLORIDE 120 MG: 30 TABLET, FILM COATED ORAL at 08:44

## 2021-04-23 RX ADMIN — IPRATROPIUM BROMIDE 2 PUFF: 17 AEROSOL, METERED RESPIRATORY (INHALATION) at 11:43

## 2021-04-23 RX ADMIN — POLYETHYLENE GLYCOL 3350 17 G: 17 POWDER, FOR SOLUTION ORAL at 08:56

## 2021-04-23 RX ADMIN — OXYCODONE HYDROCHLORIDE AND ACETAMINOPHEN 1000 MG: 500 TABLET ORAL at 20:33

## 2021-04-23 RX ADMIN — HYDRALAZINE HYDROCHLORIDE 50 MG: 50 TABLET ORAL at 23:26

## 2021-04-23 RX ADMIN — INSULIN LISPRO 6 UNITS: 100 INJECTION, SOLUTION INTRAVENOUS; SUBCUTANEOUS at 17:24

## 2021-04-23 RX ADMIN — IPRATROPIUM BROMIDE 2 PUFF: 17 AEROSOL, METERED RESPIRATORY (INHALATION) at 20:34

## 2021-04-23 RX ADMIN — INSULIN LISPRO 4 UNITS: 100 INJECTION, SOLUTION INTRAVENOUS; SUBCUTANEOUS at 11:42

## 2021-04-23 RX ADMIN — OXYCODONE HYDROCHLORIDE AND ACETAMINOPHEN 1000 MG: 500 TABLET ORAL at 08:45

## 2021-04-23 RX ADMIN — HYDRALAZINE HYDROCHLORIDE 50 MG: 50 TABLET ORAL at 05:00

## 2021-04-23 RX ADMIN — INSULIN LISPRO 8 UNITS: 100 INJECTION, SOLUTION INTRAVENOUS; SUBCUTANEOUS at 11:42

## 2021-04-23 RX ADMIN — CARVEDILOL 12.5 MG: 6.25 TABLET, FILM COATED ORAL at 16:54

## 2021-04-23 RX ADMIN — Medication 1000 UNITS: at 08:45

## 2021-04-23 RX ADMIN — KETOTIFEN FUMARATE 1 DROP: 0.35 SOLUTION/ DROPS OPHTHALMIC at 08:43

## 2021-04-23 RX ADMIN — ZINC SULFATE 220 MG (50 MG) CAPSULE 50 MG: CAPSULE at 20:33

## 2021-04-23 RX ADMIN — HYDROCODONE BITARTRATE AND ACETAMINOPHEN 1 TABLET: 5; 325 TABLET ORAL at 11:34

## 2021-04-23 RX ADMIN — TROSPIUM CHLORIDE 20 MG: 20 TABLET, FILM COATED ORAL at 16:54

## 2021-04-23 RX ADMIN — DULOXETINE HYDROCHLORIDE 60 MG: 60 CAPSULE, DELAYED RELEASE ORAL at 08:45

## 2021-04-23 RX ADMIN — BACLOFEN 10 MG: 10 TABLET ORAL at 20:33

## 2021-04-23 RX ADMIN — TROSPIUM CHLORIDE 20 MG: 20 TABLET, FILM COATED ORAL at 05:00

## 2021-04-23 RX ADMIN — SALINE NASAL SPRAY 1 SPRAY: 1.5 SOLUTION NASAL at 14:46

## 2021-04-23 RX ADMIN — HYDROCODONE BITARTRATE AND ACETAMINOPHEN 1 TABLET: 5; 325 TABLET ORAL at 04:58

## 2021-04-23 RX ADMIN — GABAPENTIN 100 MG: 100 CAPSULE ORAL at 08:45

## 2021-04-23 RX ADMIN — INSULIN GLARGINE 50 UNITS: 100 INJECTION, SOLUTION SUBCUTANEOUS at 20:38

## 2021-04-23 RX ADMIN — HYDRALAZINE HYDROCHLORIDE 10 MG: 20 INJECTION, SOLUTION INTRAMUSCULAR; INTRAVENOUS at 12:25

## 2021-04-23 RX ADMIN — SODIUM CHLORIDE, PRESERVATIVE FREE 10 ML: 5 INJECTION INTRAVENOUS at 20:34

## 2021-04-23 RX ADMIN — ATORVASTATIN CALCIUM 10 MG: 10 TABLET, FILM COATED ORAL at 08:45

## 2021-04-23 RX ADMIN — MONTELUKAST SODIUM 10 MG: 10 TABLET, FILM COATED ORAL at 20:33

## 2021-04-23 RX ADMIN — PANTOPRAZOLE SODIUM 40 MG: 40 TABLET, DELAYED RELEASE ORAL at 05:00

## 2021-04-23 RX ADMIN — DULOXETINE HYDROCHLORIDE 60 MG: 60 CAPSULE, DELAYED RELEASE ORAL at 20:33

## 2021-04-23 RX ADMIN — HYDRALAZINE HYDROCHLORIDE 50 MG: 50 TABLET ORAL at 14:45

## 2021-04-23 RX ADMIN — GABAPENTIN 100 MG: 100 CAPSULE ORAL at 20:33

## 2021-04-23 RX ADMIN — SALINE NASAL SPRAY 1 SPRAY: 1.5 SOLUTION NASAL at 16:54

## 2021-04-23 RX ADMIN — ISOSORBIDE MONONITRATE 30 MG: 30 TABLET, EXTENDED RELEASE ORAL at 08:44

## 2021-04-23 RX ADMIN — INSULIN LISPRO 8 UNITS: 100 INJECTION, SOLUTION INTRAVENOUS; SUBCUTANEOUS at 09:00

## 2021-04-23 RX ADMIN — IPRATROPIUM BROMIDE 2 PUFF: 17 AEROSOL, METERED RESPIRATORY (INHALATION) at 16:54

## 2021-04-23 RX ADMIN — GABAPENTIN 100 MG: 100 CAPSULE ORAL at 14:46

## 2021-04-23 RX ADMIN — BACLOFEN 10 MG: 10 TABLET ORAL at 08:45

## 2021-04-23 ASSESSMENT — PAIN DESCRIPTION - DESCRIPTORS
DESCRIPTORS: ACHING;CONSTANT;DISCOMFORT;PRESSURE
DESCRIPTORS: ACHING;DISCOMFORT;SORE
DESCRIPTORS: ACHING;DISCOMFORT;HEADACHE

## 2021-04-23 ASSESSMENT — PAIN DESCRIPTION - PROGRESSION
CLINICAL_PROGRESSION: GRADUALLY WORSENING
CLINICAL_PROGRESSION: GRADUALLY WORSENING
CLINICAL_PROGRESSION: NOT CHANGED
CLINICAL_PROGRESSION: GRADUALLY IMPROVING

## 2021-04-23 ASSESSMENT — PAIN DESCRIPTION - FREQUENCY
FREQUENCY: CONTINUOUS

## 2021-04-23 ASSESSMENT — PAIN DESCRIPTION - PAIN TYPE
TYPE: ACUTE PAIN
TYPE: CHRONIC PAIN

## 2021-04-23 ASSESSMENT — PAIN DESCRIPTION - ONSET
ONSET: ON-GOING
ONSET: ON-GOING

## 2021-04-23 ASSESSMENT — PAIN SCALES - GENERAL
PAINLEVEL_OUTOF10: 9
PAINLEVEL_OUTOF10: 0
PAINLEVEL_OUTOF10: 7
PAINLEVEL_OUTOF10: 6

## 2021-04-23 ASSESSMENT — PAIN DESCRIPTION - LOCATION: LOCATION: BACK;ABDOMEN

## 2021-04-23 NOTE — PROGRESS NOTES
Pulmonary Progress Note    Admit Date: 4/19/2021    Requesting Physician: Gabby Marino DO    SUBJECTIVE:   Tolerating bipap at HS, however, with dry mouth and inability to humidify pap therapy, removed it and wore O2 only. Up this morning in bathroom for am care, walking around. Denies cough, but c/o much fatigue, some mild dyspnea, and body aches, especially back and legs. Pox 89-90% on room air. Medications:     dextrose      sodium chloride        ketotifen  1 drop Both Eyes BID    dexamethasone  4 mg Oral Daily    gabapentin  100 mg Oral TID    aspirin  81 mg Oral Daily    baclofen  10 mg Oral BID    dilTIAZem  120 mg Oral Daily    DULoxetine  60 mg Oral BID    hydrALAZINE  50 mg Oral 3 times per day    isosorbide mononitrate  30 mg Oral Daily    montelukast  10 mg Oral Nightly    trospium  20 mg Oral BID AC    atorvastatin  10 mg Oral Daily    traZODone  100 mg Oral Nightly    sodium chloride flush  10 mL Intravenous 2 times per day    insulin glargine  50 Units Subcutaneous Nightly    insulin lispro  0.08 Units/kg Subcutaneous TID WC    insulin lispro  0-12 Units Subcutaneous TID WC    insulin lispro  0-6 Units Subcutaneous Nightly    Vitamin D  1,000 Units Oral Daily    vitamin C  1,000 mg Oral BID    zinc sulfate  50 mg Oral BID    ipratropium  2 puff Inhalation 4x daily    remdesivir IVPB  100 mg Intravenous Q24H    carvedilol  12.5 mg Oral BID WC    pantoprazole  40 mg Oral QAM AC        Respiratory ROS:  cough, no shortness of breath, or wheezing  Otherwise, a complete review of systems is undertaken and is negative.        Physical Examination    Vitals:  VITALS:  BP (!) 152/86   Pulse 68   Temp 98.3 °F (36.8 °C) (Oral)   Resp 14   Ht 5' 2\" (1.575 m)   Wt 221 lb (100.2 kg)   LMP 01/01/1990   SpO2 97%   BMI 40.42 kg/m²   24HR INTAKE/OUTPUT:      Intake/Output Summary (Last 24 hours) at 4/23/2021 0830  Last data filed at 4/22/2021 1254  Gross per 24 hour   Intake --   Output 400 ml   Net -400 ml     Saturating 93% on room air    General: No distress. Alert.,  Morbidly obese  ENT: No discharge. Pharynx clear. Nasal septum midline   Neck: Trachea midline. Normal thyroid. no JVD  Resp: No accessory muscle use. No wheezing. No rhonchi. Symmetrical exansion  CV:  Regular rate. Regular rhythm. No mumur or rub. ABD: Non-tender. Non-distended. Skin: Warm and dry. No rash on exposed extremities. Ext: No joint deformity. No clubbing. No cyanosis. No edema  Neuro: Awake. Follows commands. No focal deficits. Moves all ext     Lab Results:    CBC:   Recent Labs     04/21/21  0605 04/22/21  1110   WBC 5.7 10.1   HGB 13.7 14.2   HCT 43.3 45.4   MCV 98.2 99.1    290     BMP:   Recent Labs     04/21/21  0605 04/22/21  1110    138   K 4.9 4.6    99   CO2 28 32*   BUN 23 21   CREATININE 0.8 0.9      ALB:3,BILIDIR:3,BILITOT:3,ALKPHOS:3)@  PT/INR:   No results for input(s): PROTIME, INR in the last 72 hours. Cultures:  -    Films:  CXR 4/19 left upper lobe opacity    CT 4/19 CT chest groundglass opacities    Assessment/Plan:   76 y.o. female who presented with cough, sore throat  4/19 CT scan groundglass opacities CT showed swelling of soft palate lesion at the base of tongue  Covid positive rapid  4/20 blood pressure 200/45   proBNP 1179  4/21 2L NC, wore BIPAP all night  4/21 CT chest 7 x 5 mm lesion central base of tongue  4/22 room air 91%, decadron to 4mg  4/23 room air 89-90% while up walking      1. Covid pneumonitis  2. Acute hypoxic respiratory failure  resolved  3. Asthma/COPD/Severe restrictive lung disease with positive methacholine challenge with mild intermittent asthma  4. Chronic hypercapnic respiratory failure on BiPAP daily at bedtime  5. DAYAN on BiPAP 27/23 daily at bedtime  6. Component diastolic heart failure  7. Severe cardiomyopathy with the EF 35% on echo from 1/4/2017.   Repeat echo 6/5/2018 EF 45% stage II diastolic dysfunction

## 2021-04-23 NOTE — PROGRESS NOTES
Pulse ox 93% on RA at rest.  Pulse ox 91% on RA while ambulating. Pulse ox 93% on RA during recovery. No need for supplemental oxygen.

## 2021-04-23 NOTE — PROGRESS NOTES
Patient c/o worsening headache. /92, loss of IV access. IV team notified patient is a priority, they will be on the unit shortly to place new IV. Will give prn IV hydralazine once IV access is obtained.

## 2021-04-24 VITALS
SYSTOLIC BLOOD PRESSURE: 186 MMHG | BODY MASS INDEX: 40.67 KG/M2 | WEIGHT: 221 LBS | TEMPERATURE: 98.1 F | RESPIRATION RATE: 18 BRPM | DIASTOLIC BLOOD PRESSURE: 97 MMHG | HEART RATE: 80 BPM | HEIGHT: 62 IN | OXYGEN SATURATION: 92 %

## 2021-04-24 LAB
ANION GAP SERPL CALCULATED.3IONS-SCNC: 8 MMOL/L (ref 7–16)
BASOPHILS ABSOLUTE: 0.01 E9/L (ref 0–0.2)
BASOPHILS RELATIVE PERCENT: 0.1 % (ref 0–2)
BUN BLDV-MCNC: 34 MG/DL (ref 6–23)
CALCIUM SERPL-MCNC: 8.6 MG/DL (ref 8.6–10.2)
CHLORIDE BLD-SCNC: 97 MMOL/L (ref 98–107)
CO2: 30 MMOL/L (ref 22–29)
CREAT SERPL-MCNC: 1.1 MG/DL (ref 0.5–1)
EOSINOPHILS ABSOLUTE: 0 E9/L (ref 0.05–0.5)
EOSINOPHILS RELATIVE PERCENT: 0 % (ref 0–6)
GFR AFRICAN AMERICAN: >60
GFR NON-AFRICAN AMERICAN: >60 ML/MIN/1.73
GLUCOSE BLD-MCNC: 259 MG/DL (ref 74–99)
HCT VFR BLD CALC: 42.1 % (ref 34–48)
HEMOGLOBIN: 13.5 G/DL (ref 11.5–15.5)
IMMATURE GRANULOCYTES #: 0.05 E9/L
IMMATURE GRANULOCYTES %: 0.7 % (ref 0–5)
LYMPHOCYTES ABSOLUTE: 0.58 E9/L (ref 1.5–4)
LYMPHOCYTES RELATIVE PERCENT: 7.9 % (ref 20–42)
MCH RBC QN AUTO: 31.4 PG (ref 26–35)
MCHC RBC AUTO-ENTMCNC: 32.1 % (ref 32–34.5)
MCV RBC AUTO: 97.9 FL (ref 80–99.9)
METER GLUCOSE: 199 MG/DL (ref 74–99)
MONOCYTES ABSOLUTE: 0.32 E9/L (ref 0.1–0.95)
MONOCYTES RELATIVE PERCENT: 4.4 % (ref 2–12)
NEUTROPHILS ABSOLUTE: 6.37 E9/L (ref 1.8–7.3)
NEUTROPHILS RELATIVE PERCENT: 86.9 % (ref 43–80)
PDW BLD-RTO: 12.4 FL (ref 11.5–15)
PLATELET # BLD: 286 E9/L (ref 130–450)
PMV BLD AUTO: 10.8 FL (ref 7–12)
POTASSIUM REFLEX MAGNESIUM: 5 MMOL/L (ref 3.5–5)
RBC # BLD: 4.3 E12/L (ref 3.5–5.5)
SODIUM BLD-SCNC: 135 MMOL/L (ref 132–146)
WBC # BLD: 7.3 E9/L (ref 4.5–11.5)

## 2021-04-24 PROCEDURE — 6370000000 HC RX 637 (ALT 250 FOR IP): Performed by: INTERNAL MEDICINE

## 2021-04-24 PROCEDURE — 80048 BASIC METABOLIC PNL TOTAL CA: CPT

## 2021-04-24 PROCEDURE — 94660 CPAP INITIATION&MGMT: CPT

## 2021-04-24 PROCEDURE — 36415 COLL VENOUS BLD VENIPUNCTURE: CPT

## 2021-04-24 PROCEDURE — 2580000003 HC RX 258: Performed by: INTERNAL MEDICINE

## 2021-04-24 PROCEDURE — 82962 GLUCOSE BLOOD TEST: CPT

## 2021-04-24 PROCEDURE — 85025 COMPLETE CBC W/AUTO DIFF WBC: CPT

## 2021-04-24 PROCEDURE — 99239 HOSP IP/OBS DSCHRG MGMT >30: CPT | Performed by: INTERNAL MEDICINE

## 2021-04-24 RX ORDER — DEXAMETHASONE 2 MG/1
TABLET ORAL
Qty: 15 TABLET | Refills: 0 | Status: SHIPPED | OUTPATIENT
Start: 2021-04-24 | End: 2021-05-05 | Stop reason: ALTCHOICE

## 2021-04-24 RX ORDER — DEXAMETHASONE 4 MG/1
4 TABLET ORAL DAILY
Qty: 5 TABLET | Refills: 0 | Status: SHIPPED | OUTPATIENT
Start: 2021-04-24 | End: 2021-04-24

## 2021-04-24 RX ORDER — CARVEDILOL 12.5 MG/1
12.5 TABLET ORAL 2 TIMES DAILY WITH MEALS
Qty: 60 TABLET | Refills: 3 | Status: ON HOLD | OUTPATIENT
Start: 2021-04-24 | End: 2021-06-16 | Stop reason: HOSPADM

## 2021-04-24 RX ORDER — CARVEDILOL 12.5 MG/1
12.5 TABLET ORAL 2 TIMES DAILY WITH MEALS
Qty: 60 TABLET | Refills: 3 | Status: SHIPPED | OUTPATIENT
Start: 2021-04-24 | End: 2021-04-24

## 2021-04-24 RX ORDER — DEXAMETHASONE 4 MG/1
TABLET ORAL
Qty: 15 TABLET | Refills: 0 | Status: SHIPPED | OUTPATIENT
Start: 2021-04-24 | End: 2021-04-24 | Stop reason: HOSPADM

## 2021-04-24 RX ADMIN — DILTIAZEM HYDROCHLORIDE 120 MG: 30 TABLET, FILM COATED ORAL at 08:04

## 2021-04-24 RX ADMIN — TROSPIUM CHLORIDE 20 MG: 20 TABLET, FILM COATED ORAL at 06:17

## 2021-04-24 RX ADMIN — ASPIRIN 81 MG: 81 TABLET, CHEWABLE ORAL at 08:04

## 2021-04-24 RX ADMIN — CARVEDILOL 12.5 MG: 6.25 TABLET, FILM COATED ORAL at 08:03

## 2021-04-24 RX ADMIN — DULOXETINE HYDROCHLORIDE 60 MG: 60 CAPSULE, DELAYED RELEASE ORAL at 08:04

## 2021-04-24 RX ADMIN — SALINE NASAL SPRAY 1 SPRAY: 1.5 SOLUTION NASAL at 08:05

## 2021-04-24 RX ADMIN — GABAPENTIN 100 MG: 100 CAPSULE ORAL at 08:03

## 2021-04-24 RX ADMIN — Medication 1000 UNITS: at 08:04

## 2021-04-24 RX ADMIN — OXYCODONE HYDROCHLORIDE AND ACETAMINOPHEN 1000 MG: 500 TABLET ORAL at 08:03

## 2021-04-24 RX ADMIN — SODIUM CHLORIDE, PRESERVATIVE FREE 10 ML: 5 INJECTION INTRAVENOUS at 08:03

## 2021-04-24 RX ADMIN — ATORVASTATIN CALCIUM 10 MG: 10 TABLET, FILM COATED ORAL at 08:03

## 2021-04-24 RX ADMIN — BACLOFEN 10 MG: 10 TABLET ORAL at 08:04

## 2021-04-24 RX ADMIN — IPRATROPIUM BROMIDE 2 PUFF: 17 AEROSOL, METERED RESPIRATORY (INHALATION) at 08:05

## 2021-04-24 RX ADMIN — ZINC SULFATE 220 MG (50 MG) CAPSULE 50 MG: CAPSULE at 08:06

## 2021-04-24 RX ADMIN — INSULIN LISPRO 2 UNITS: 100 INJECTION, SOLUTION INTRAVENOUS; SUBCUTANEOUS at 08:20

## 2021-04-24 RX ADMIN — KETOTIFEN FUMARATE 1 DROP: 0.35 SOLUTION/ DROPS OPHTHALMIC at 08:04

## 2021-04-24 RX ADMIN — HYDRALAZINE HYDROCHLORIDE 50 MG: 50 TABLET ORAL at 06:17

## 2021-04-24 RX ADMIN — ISOSORBIDE MONONITRATE 30 MG: 30 TABLET, EXTENDED RELEASE ORAL at 08:04

## 2021-04-24 RX ADMIN — INSULIN LISPRO 8 UNITS: 100 INJECTION, SOLUTION INTRAVENOUS; SUBCUTANEOUS at 08:20

## 2021-04-24 RX ADMIN — PANTOPRAZOLE SODIUM 40 MG: 40 TABLET, DELAYED RELEASE ORAL at 06:17

## 2021-04-24 RX ADMIN — HYDROCODONE BITARTRATE AND ACETAMINOPHEN 1 TABLET: 5; 325 TABLET ORAL at 04:33

## 2021-04-24 ASSESSMENT — PAIN SCALES - GENERAL: PAINLEVEL_OUTOF10: 10

## 2021-04-24 ASSESSMENT — PAIN DESCRIPTION - PROGRESSION: CLINICAL_PROGRESSION: NOT CHANGED

## 2021-04-24 ASSESSMENT — PAIN DESCRIPTION - DESCRIPTORS: DESCRIPTORS: ACHING;DISCOMFORT;SORE

## 2021-04-24 ASSESSMENT — PAIN - FUNCTIONAL ASSESSMENT: PAIN_FUNCTIONAL_ASSESSMENT: PREVENTS OR INTERFERES SOME ACTIVE ACTIVITIES AND ADLS

## 2021-04-24 ASSESSMENT — PAIN DESCRIPTION - FREQUENCY: FREQUENCY: CONTINUOUS

## 2021-04-24 ASSESSMENT — PAIN DESCRIPTION - ONSET: ONSET: ON-GOING

## 2021-04-24 ASSESSMENT — PAIN DESCRIPTION - LOCATION: LOCATION: BACK

## 2021-04-24 ASSESSMENT — PAIN DESCRIPTION - PAIN TYPE: TYPE: CHRONIC PAIN

## 2021-04-24 NOTE — PROGRESS NOTES
Clary Lozano Hospitalist   Progress Note    Admitting Date and Time: 4/19/2021  7:47 PM  Admit Dx: Acute respiratory disease due to 2019 novel coronavirus [U07.1, J06.9]    Subjective:    Patient was admitted with Acute respiratory disease due to 2019 novel coronavirus [U07.1, J06.9].  Patient denies fever, chills, cp, sob, n/v. Pt states she is ambulating and using her IS     sodium chloride  1 spray Each Nostril 4x Daily    ketotifen  1 drop Both Eyes BID    dexamethasone  4 mg Oral Daily    gabapentin  100 mg Oral TID    aspirin  81 mg Oral Daily    baclofen  10 mg Oral BID    dilTIAZem  120 mg Oral Daily    DULoxetine  60 mg Oral BID    hydrALAZINE  50 mg Oral 3 times per day    isosorbide mononitrate  30 mg Oral Daily    montelukast  10 mg Oral Nightly    trospium  20 mg Oral BID AC    atorvastatin  10 mg Oral Daily    traZODone  100 mg Oral Nightly    sodium chloride flush  10 mL Intravenous 2 times per day    insulin glargine  50 Units Subcutaneous Nightly    insulin lispro  0.08 Units/kg Subcutaneous TID WC    insulin lispro  0-12 Units Subcutaneous TID WC    insulin lispro  0-6 Units Subcutaneous Nightly    Vitamin D  1,000 Units Oral Daily    vitamin C  1,000 mg Oral BID    zinc sulfate  50 mg Oral BID    ipratropium  2 puff Inhalation 4x daily    remdesivir IVPB  100 mg Intravenous Q24H    carvedilol  12.5 mg Oral BID WC    pantoprazole  40 mg Oral QAM AC     glucose, 15 g, PRN  dextrose, 12.5 g, PRN  glucagon (rDNA), 1 mg, PRN  dextrose, 100 mL/hr, PRN  sodium chloride flush, 10 mL, PRN  sodium chloride, 25 mL, PRN  polyethylene glycol, 17 g, Daily PRN  acetaminophen, 650 mg, Q6H PRN    Or  acetaminophen, 650 mg, Q6H PRN  albuterol sulfate HFA, 2 puff, Q6H PRN  sodium chloride, 30 mL, PRN  hydrALAZINE, 10 mg, Q4H PRN  HYDROcodone 5 mg - acetaminophen, 1 tablet, Q6H PRN         Objective:    BP (!) 146/83   Pulse 72   Temp 98.6 °F (37 °C) (Oral)   Resp 18   Ht 5' 2\" (1.575 m)   Wt 221 lb (100.2 kg)   LMP 01/01/1990   SpO2 93%   BMI 40.42 kg/m²   Skin: warm and dry, no rash or erythema  Pulmonary/Chest: clear to auscultation bilaterally- no wheezes, rales or rhonchi, normal air movement, no respiratory distress  Cardiovascular: rhythm reg at rate of 76  Abdomen: soft, non-tender, non-distended, normal bowel sounds, no masses or organomegaly  Extremities: no cyanosis, no clubbing and no edema      Recent Labs     04/21/21  0605 04/22/21  1110 04/23/21  1210    138 135   K 4.9 4.6 4.7    99 97*   CO2 28 32* 30*   BUN 23 21 26*   CREATININE 0.8 0.9 1.0   GLUCOSE 249* 132* 234*   CALCIUM 8.8 9.6 8.9       Recent Labs     04/21/21  0605 04/22/21  1110 04/23/21  1210   WBC 5.7 10.1 7.6   RBC 4.41 4.58 4.51   HGB 13.7 14.2 14.0   HCT 43.3 45.4 44.1   MCV 98.2 99.1 97.8   MCH 31.1 31.0 31.0   MCHC 31.6* 31.3* 31.7*   RDW 12.7 12.5 12.4    290 265   MPV 11.0 10.9 10.8       CBC with Differential:    Lab Results   Component Value Date    WBC 7.6 04/23/2021    RBC 4.51 04/23/2021    HGB 14.0 04/23/2021    HCT 44.1 04/23/2021     04/23/2021    MCV 97.8 04/23/2021    MCH 31.0 04/23/2021    MCHC 31.7 04/23/2021    RDW 12.4 04/23/2021    NRBC 0.0 04/22/2021    SEGSPCT 84 03/03/2014    LYMPHOPCT 7.5 04/23/2021    MONOPCT 3.7 04/23/2021    BASOPCT 0.0 04/23/2021    MONOSABS 0.28 04/23/2021    LYMPHSABS 0.57 04/23/2021    EOSABS 0.00 04/23/2021    BASOSABS 0.00 04/23/2021     BMP:    Lab Results   Component Value Date     04/23/2021    K 4.7 04/23/2021    CL 97 04/23/2021    CO2 30 04/23/2021    BUN 26 04/23/2021    LABALBU 3.5 04/19/2021    LABALBU 5.2 12/01/2011    CREATININE 1.0 04/23/2021    CALCIUM 8.9 04/23/2021    GFRAA >60 04/23/2021    LABGLOM >60 04/23/2021    GLUCOSE 234 04/23/2021    GLUCOSE 181 12/01/2011        Radiology:   CT SOFT TISSUE NECK W CONTRAST   Final Result   Diffuse oropharyngeal wall thickening with thickening of the soft palate,   likely related to infectious/inflammatory etiology. Nonspecific small 7 x 5 mm hyperenhancing rounding lesion at the central base   of tongue. This finding was not identified on CT C-spine January 9, 2019 to   suggest ectopic thyroid tissue. Correlation with direct visual   inspection/ENT consultation is recommended. CTA PULMONARY W CONTRAST   Final Result   No evidence of pulmonary embolism. Multifocal bilateral patchy ground-glass opacities, compatible with atypical   or COVID related pneumonia. XR CHEST PORTABLE   Final Result   Possible left upper lobe opacity. Stable cardiomegaly. Assessment:    Active Problems:    Acute respiratory disease due to 2019 novel coronavirus    Pneumonia due to COVID-19 virus  Resolved Problems:    * No resolved hospital problems. *      Plan:  1. Acute respiratory failure with hypoxia adjust o2  Encourage IS. 2. Pneumonia due to covid remdesivir and steroids  Encourage activity  3. htn continue meds(coreg adjusted)  4. Dm type 2 controlled monitor bs and tx with insulin  5. Asthma/copd continue meds  6. abnormal ct scan ENT seen and will reevaluate as outpt    Pt continuing to feel better overall    D/w pulmonary earlier and tentative discharge tomorrow.      Electronically signed by Ru Higginbotham DO on 4/23/2021 at 8:41 PM

## 2021-04-24 NOTE — PROGRESS NOTES
Discharge instructions explained to patient. Understands and no questions at this time. Scripts given to patient. Waiting for ride to arrive at this time.

## 2021-04-24 NOTE — PROGRESS NOTES
Date: 4/23/2021    Time: 11:04 PM    Patient Placed On BIPAP/CPAP/ Non-Invasive Ventilation? Yes    If no must comment. Facial area red/color change? No           If YES are Blister/Lesion present? No   If yes must notify nursing staff  BIPAP/CPAP skin barrier?   Yes    Skin barrier type:mepilexlite       Comments:        Ben Anne

## 2021-04-24 NOTE — DISCHARGE INSTR - COC
Continuity of Care Form    Patient Name: Catarina Zeng   :  1955  MRN:  [id number]    Admit date:  2021  Discharge date:  ***    Code Status Order: Full Code   Advance Directives:   Advance Care Flowsheet Documentation     Date/Time Healthcare Directive Type of Healthcare Directive Copy in [address] Agent's Name Healthcare Agent's Phone Number    21 1708  No, patient does not have an advance directive for healthcare treatment -- -- -- -- --          Admitting Physician:  Nnamdi Kimball MD  PCP: Cathryn Luke DO    Discharging Nurse: [facility] Unit/Room#: 4658/4370-B  Discharging Unit Phone Number: ***    Emergency Contact:   Extended Emergency Contact Information  Primary Emergency Contact: [facility]  Address: [address] [place] [address], [address] [place] [address] Phone: [phone]  Relation: Child   needed? No  Secondary Emergency Contact: [facility] [address] [facility] Phone: [phone]  Relation: Brother/Sister   needed? No    Past Surgical History:  Past Surgical History:   Procedure Laterality Date    ANGIOPLASTY  2018    Dr. Latricia Polanco & athrectomy L SFA & Popliteal    BREAST SURGERY  2000    bilateral reduction    BRONCHIAL BRUSH BIOPSY  2013    Dr Valentin Remedrodney  2015    Dr. Suarez Rolling  2011     DR. Constantino Muñoz,  follows Dr Jakub Olguin  11/15/2013    Dr Nadyne Schaumann COLONOSCOPY  2016    Dr Choe Lute adenoma & hyperplastic polyps, melanosis coli (repeat one year 2017)    CORONARY ARTERY BYPASS GRAFT      ECHO COMPLETE  10/9/2013         ENDOSCOPY, COLON, DIAGNOSTIC  2017    GALLBLADDER SURGERY      gallstones removed, [facility] 2017    HYSTERECTOMY      JOINT REPLACEMENT      LEFT KNEE    KNEE SURGERY      left knee replacement  LAYER WOUND CLOSURE Left [id number]    LIVER BIOPSY  1/8/2016     E's    POLYSOM[facility]GRAPHY  1/2016    [facility]    UPPER GASTROINTESTINAL ENDOSCOPY  12/20/12    UPPER GASTROINTESTINAL ENDOSCOPY  12/23/2016    Dr Soren Smith   [facility] ENDOSCOPY  02/08/2017       Immunization History:   Immunization History   Administered Date(s) Administered    Pneumococcal Polysaccharide ([id number]) 09/08/2020       Active Problems:  Patient Active Problem List   Diagnosis Code    Morbid obesity due to excess calories ([facility]) E66.01    Hyperlipidemia E78.5    DAYAN and COPD overlap syndrome ([facility]) G47.33, J44.9    Vitamin D insufficiency E55.9    Chronic combined systolic and diastolic heart failure ([facility]) I50.42    Essential hypertension I10    GERD (gastroesophageal reflux disease) K21.9    Major depressive disorder, recurrent episode, mild ([facility]) F33.0    Diabetic polyneuropathy associated with type 2 diabetes mellitus ([facility]) E11.42    Chronic passive hepatic congestion K76.1    Mixed incontinence urge and stress N39.46    Chronic back pain - d/t muscle spasm M54.9, G89.29    Glaucoma, open angle H40.10X0    Elevated CA 19-9 level R97.8    Lumbar stenosis M48.061    Spondylosis 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 M47.816    Lumbar disc herniation M51.26    Asymmetric septal hypertrophy ([facility]) I42.2    Non-compliance Z91.19    Hiatal hernia K44.9    Melanosis coli K63.89    Coronary artery disease involving native coronary artery of native heart without angina pectoris I25.10    DM2 (diabetes mellitus, type 2) ([facility]) E11.9    Cigarette smoker F17.210    Marijuana use, smoked F12.90    Ischemic cardiomyopathy I25.5    PVD (peripheral vascular disease) with claudication ([facility]) I73.9    Chronic venous insufficiency I87.2    History of non-ST elevation myocardial infarction (NSTEMI) I25.2    QT prolongation R94.31    Cerebellar infarct ([facility]) I63.9    COPD exacerbation LIKR:981155757}  Transfer  {CHP DME VURO:248208093}  Bathing  {CHP DME KHIA:751297114}  Dressing  {CHP DME VZBZ:476977211}  Toileting  {CHP DME SVKY:484908967}  Feeding  {CHP DME TWZL:241917280}  Med Admin  {CHP DME ADBY:042815993}  Med Delivery   { JESSI MED Delivery:913329655}    Wound Care Documentation and Therapy:        Elimination:  Continence:   · Bowel: {YES / SQ:39123}  · Bladder: {YES / FR:12437}  Urinary Catheter: {Urinary Catheter:863480729}   Colostomy/Ileostomy/Ileal Conduit: {YES / KW:61171}       Date of Last BM: ***    Intake/Output Summary (Last 24 hours) at 2021 1045  Last data filed at 2021 1257  Gross per 24 hour   Intake 320 ml   Output --   Net 320 ml     I/O last 3 completed shifts:   In: 600 [P.O.:600]  Out: 1200 [Urine:1200]    Safety Concerns:     [employer] Safety Concerns:683802945}    Impairments/Disabilities:      [employer] Impairments/Disabilities:007926248}    Nutrition Therapy:  Current Nutrition Therapy:   [employer] Diet List:164098474}    Routes of Feeding: {P DME Other Feedings:778057271}  Liquids: {Slp liquid thickness:17362}  Daily Fluid Restriction: {CHP DME Yes amt example:623817313}  Last Modified Barium Swallow with Video (Video Swallowing Test): {Done Not Done LRAX:515331617}    Treatments at the Time of Hospital Discharge:   Respiratory Treatments: ***  Oxygen Therapy:  {Therapy; copd oxygen:55079}  Ventilator:    { CC Vent ZBLT:608215776}    Rehab Therapies: {THERAPEUTIC INTERVENTION:1424385225}  Weight Bearing Status/Restrictions: [employer] Weight Bearin}  Other Medical Equipment (for information only, NOT a DME order):  {EQUIPMENT:145473967}  Other Treatments: ***    Patient's personal belongings (please select all that are sent with patient):  {P DME Belongings:831487276}    RN SIGNATURE:  {Esignature:634520029}    CASE MANAGEMENT/SOCIAL WORK SECTION    Inpatient Status Date: ***    Readmission Risk Assessment Score:  Readmission Risk Risk of Unplanned Readmission:        32           Discharging to Facility/ Agency   · Name:   · Address:  · Phone:  · Fax:    Dialysis Facility (if applicable)   · Name:  · Address:  · Dialysis Schedule:  · Phone:  · Fax:    / signature: {Esignature:620021010}    PHYSICIAN SECTION    Prognosis: {Prognosis:9575026965}    Condition at Discharge: Valentín Johns Patient Condition:500462866}    Rehab Potential (if transferring to Rehab): {Prognosis:4060831352}    Recommended Labs or Other Treatments After Discharge: ***    Physician Certification: I certify the above information and transfer of Catarina Zeng  is necessary for the continuing treatment of the diagnosis listed and that she requires {Admit to Appropriate Level of Care:34822} for {GREATER/LESS:531830003} 30 days.      Update Admission H&P: {CHP DME Changes in [facility]:916520628}    PHYSICIAN SIGNATURE:  {Esignature:254430603}

## 2021-04-24 NOTE — DISCHARGE SUMMARY
04/24/2021    RDW 12.4 04/24/2021    NRBC 0.0 04/22/2021    SEGSPCT 84 03/03/2014    LYMPHOPCT 7.9 04/24/2021    MONOPCT 4.4 04/24/2021    BASOPCT 0.1 04/24/2021    MONOSABS 0.32 04/24/2021    LYMPHSABS 0.58 04/24/2021    EOSABS 0.00 04/24/2021    BASOSABS 0.01 04/24/2021     BMP:    Lab Results   Component Value Date     04/24/2021    K 5.0 04/24/2021    CL 97 04/24/2021    CO2 30 04/24/2021    BUN 34 04/24/2021    LABALBU 3.5 04/19/2021    LABALBU 5.2 12/01/2011    CREATININE 1.1 04/24/2021    CALCIUM 8.6 04/24/2021    GFRAA >60 04/24/2021    LABGLOM >60 04/24/2021    GLUCOSE 259 04/24/2021    GLUCOSE 181 12/01/2011       Imaging:   CT SOFT TISSUE NECK W CONTRAST   Final Result   Diffuse oropharyngeal wall thickening with thickening of the soft palate,   likely related to infectious/inflammatory etiology. Nonspecific small 7 x 5 mm hyperenhancing rounding lesion at the central base   of tongue. This finding was not identified on CT C-spine January 9, 2019 to   suggest ectopic thyroid tissue. Correlation with direct visual   inspection/ENT consultation is recommended. CTA PULMONARY W CONTRAST   Final Result   No evidence of pulmonary embolism. Multifocal bilateral patchy ground-glass opacities, compatible with atypical   or COVID related pneumonia. XR CHEST PORTABLE   Final Result   Possible left upper lobe opacity. Stable cardiomegaly. Patient Instructions:      Medication List      START taking these medications    dexamethasone 2 MG tablet  Commonly known as: DECADRON  Take orally 2 tabs from 4/25-4/29 then 1 tab on 4/30-5/4     Tylenol 325 MG Caps  Generic drug: Acetaminophen  Take 325 mg by mouth every 6 hours as needed (pain)        CHANGE how you take these medications    baclofen 10 MG tablet  Commonly known as: LIORESAL  TAKE 1 TABLET BY MOUTH TWICE A DAY AS NEEDED  What changed: See the new instructions.      carvedilol 12.5 MG tablet  Commonly known as: COREG  Take 1 tablet by mouth 2 times daily (with meals)  What changed:   · medication strength  · how much to take     hydrocortisone 2.5 % cream  APPLY TO AFFECTED AREA TWICE DAILY  What changed:   · how much to take  · how to take this  · when to take this  · reasons to take this  · additional instructions        CONTINUE taking these medications    Accu-Chek Guide Me w/Device Kit  Use as directed     Accu-Chek Guide strip  Generic drug: blood glucose test strips  1 each by In Vitro route 3 times daily     albuterol (2.5 MG/3ML) 0.083% nebulizer solution  Commonly known as: PROVENTIL     ammonium lactate 12 % lotion  Commonly known as: LAC-HYDRIN     aspirin 81 MG chewable tablet  Commonly known as: Aspirin Low Dose  TAKE 1 TABLET BY MOUTH EVERY DAY     azelastine 0.1 % nasal spray  Commonly known as: ASTELIN  1 spray by Nasal route 2 times daily Use in each nostril as directed     B-D UF III MINI PEN NEEDLES 31G X 5 MM Misc  Generic drug: Insulin Pen Needle  USE AS DIRECTED     BiPAP Machine Misc     bumetanide 1 MG tablet  Commonly known as: BUMEX  TAKE (1) TABLET BY MOUTH DAILY     calcium carbonate 500 MG Tabs tablet  Commonly known as: OSCAL     cetirizine 10 MG tablet  Commonly known as: ZYRTEC  TAKE 1 TABLET BY MOUTH EVERY DAY     cholestyramine 4 g packet  Commonly known as: QUESTRAN     ciclopirox 0.77 % cream  Commonly known as: LOPROX     [facility] Alcohol Prep Swabs 70 % Pads  Use daily     [facility] Gas Relief Ultra Strength 180 MG Caps  Generic drug: Simethicone     dilTIAZem 120 MG tablet  Commonly known as: CARDIZEM     docusate sodium 100 MG capsule  Commonly known as: COLACE     DULoxetine 60 MG extended release capsule  Commonly known as: CYMBALTA  TAKE ONE (1) CAPSULE BY MOUTH TWICE DAILY     Entresto 24-26 MG per tablet  Generic drug: sacubitril-valsartan  TAKE ONE (1) TABLET BY MOUTH TWICE DAILY     famotidine 40 MG tablet  Commonly known as: PEPCID     fluticasone 50 MCG/ACT nasal spray  Commonly known as: FLONASE  USE 1 SPRAY IN EACH NOSTRIL TWICE A DAY     Glucose Management Tabs  Use daily prn if low glucose <60     Handicap Placard Misc  by Does not apply route Patient cannot walk 200 ft without stopping to rest.    Expiration 8/2022     hydrALAZINE 50 MG tablet  Commonly known as: APRESOLINE  TAKE 1 TABLET BY MOUTH THREE TIMES A DAY     isosorbide mononitrate 60 MG extended release tablet  Commonly known as: IMDUR  TAKE (1) TABLET BY MOUTH DAILY     ketorolac 0.5 % ophthalmic solution  Commonly known as: ACULAR     Klor-Con 10 10 MEQ extended release tablet  Generic drug: potassium chloride     Lidocaine (Anorectal) 5 % Crea     lidocaine 5 % ointment  Commonly known as: XYLOCAINE     Lift Chair Misc  by Does not apply route Use daily     magnesium oxide 400 MG tablet  Commonly known as: MAG-OX     meloxicam 7.5 MG tablet  Commonly known as: MOBIC  TAKE 1 TABLET BY MOUTH EVERY DAY     * Misc.  Devices Misc  Power wheelchair     * Nova Cushion Gel Seat Pad Misc  Use daily     montelukast 10 MG tablet  Commonly known as: SINGULAIR  TAKE 1 TABLET BY MOUTH NIGHTLY     Myrbetriq 50 MG Tb24  Generic drug: mirabegron  TAKE 1 TABLET BY MOUTH EVERY DAY     olopatadine 0.2 % Soln ophthalmic solution  Commonly known as: PATADAY     omeprazole 40 MG delayed release capsule  Commonly known as: PRILOSEC     ondansetron 4 MG tablet  Commonly known as: ZOFRAN     polyethylene glycol 17 GM/SCOOP powder  Commonly known as: GLYCOLAX     senna 8.6 MG tablet  Commonly known as: SENOKOT     simvastatin 20 MG tablet  Commonly known as: ZOCOR  TAKE 1 TABLET BY MOUTH NIGHTLY     * SOFT TOUCH LANCETS Misc  Use 3 x daily     * Accu-Chek FastClix Lancets Misc  Use 4x daily     Spiriva Respimat 1.25 MCG/ACT Aers inhaler  Generic drug: tiotropium  INHALE TWO (2) PUFFS BY MOUTH EVERY DAY     spironolactone 25 MG tablet  Commonly known as: ALDACTONE  Take 1 tablet by mouth daily     therapeutic multivitamin-minerals tablet     traZODone 100 MG tablet  Commonly known as: DESYREL  TAKE (1) TABLET BY MOUTH NIGHTLY     Tresiba FlexTouch 200 UNIT/ML Sopn  Generic drug: Insulin Degludec  INJECT 50 UNITS INTO THE SKIN nightly     triamcinolone 0.1 % cream  Commonly known as: KENALOG     vitamin D3 25 MCG (1000 UT) Tabs tablet  Commonly known as: CHOLECALCIFEROL  TAKE 1 TABLET BY MOUTH EVERY DAY     zinc 50 MG Tabs tablet         * This list has 4 medication(s) that are the same as other medications prescribed for you. Read the directions carefully, and ask your doctor or other care provider to review them with you.             STOP taking these medications    loperamide 2 MG capsule  Commonly known as: IMODIUM           Where to Get Your Medications      These medications were sent to [address] [address]    Phone: [phone]   · Tylenol 325 MG Caps     You can get these medications from any pharmacy    Bring a paper prescription for each of these medications  · carvedilol 12.5 MG tablet  · dexamethasone 2 MG tablet           Total time for discharge is 37 min    Signed:  Electronically signed by Ru Higginbotham DO on 4/24/2021 at 4:14 PM

## 2021-04-26 ENCOUNTER — TELEPHONE (OUTPATIENT)
Dept: FAMILY MEDICINE CLINIC | Age: 66
End: 2021-04-26

## 2021-04-26 ENCOUNTER — TELEPHONE (OUTPATIENT)
Dept: CARDIOLOGY CLINIC | Age: 66
End: 2021-04-26

## 2021-04-26 ENCOUNTER — CARE COORDINATION (OUTPATIENT)
Dept: CASE MANAGEMENT | Age: 66
End: 2021-04-26

## 2021-04-26 RX ORDER — MELATONIN
Qty: 90 TABLET | Refills: 1 | Status: SHIPPED
Start: 2021-04-26 | End: 2022-04-11

## 2021-04-26 RX ORDER — ZINC GLUCONATE 50 MG
50 TABLET ORAL DAILY
Qty: 30 TABLET | Refills: 2 | Status: ON HOLD
Start: 2021-04-26 | End: 2021-06-16 | Stop reason: HOSPADM

## 2021-04-26 RX ORDER — MAGNESIUM OXIDE 400 MG/1
400 TABLET ORAL DAILY
Qty: 30 TABLET | Refills: 2 | Status: ON HOLD
Start: 2021-04-26 | End: 2021-06-16 | Stop reason: HOSPADM

## 2021-04-26 NOTE — TELEPHONE ENCOUNTER
Pt was discharged from hospital for Covid on /25/21. She needs the following meds ordered:    Vit D-3  Magnesium  Zinc     [facility] [address]. Scheduled appt.  With Dr. Una Barahona on 5/4/21

## 2021-04-26 NOTE — TELEPHONE ENCOUNTER
Discussed with the patient and advised to stay on 12.5 mg p.o. twice daily. Home blood pressures are now well controlled.

## 2021-04-26 NOTE — CARE COORDINATION
Patient contacted by LPN for 24 hour CTN d/t Covid 19 diagnosis, patient states she is better but still achy all over, patient has cough with sputum. Valerie fever, chills, SOB upon exertion. Patient has pulse ox and has been reading 92-93% patient has been unable to eat much drinking well. Patient had not had BM in 5 days she took magnesium citrate yesterday and it has been effective. BS has been high d/t prednisone use. Patient [facility] coming 2 to 3 times a week medications reviewed. No concerns at present time. Patient contacted regarding NPKRP-20 diagnosis\". Discussed COVID-19 related testing which was available at this time. Test results were positive. Patient informed of results, if available? Patient aware    LPN Care Coordinator contacted the patient by telephone to perform post discharge assessment. Call within 2 business days of discharge: Yes. Verified name and  with patient as identifiers. Provided introduction to self, and explanation of the CTN/ACM role, and reason for call due to risk factors for infection and/or exposure to COVID-19. Symptoms reviewed with patient who verbalized the following symptoms: fatigue, pain or aching joints, cough, shortness of breath and loss of taste or smell. Due to no new or worsening symptoms encounter was not routed to provider for escalation. Discussed follow-up appointments.  If no appointment was previously scheduled, appointment scheduling offered: Yes  [facility] follow up appointment(s):   Future Appointments   Date Time Provider Melanie Sellers   2021 11:45 AM DO Edwin Acosta [facility]   2021  3:00 PM Amalia Antonio [address] [place] [address] ENT [facility]   2021  2:00 PM DO Edwin Acosta [facility]   2021  2:30 PM Pam Light MD [facility]     Non-[facility] follow up appointment(s): na    Non-face-to-face services provided:  Obtained and reviewed discharge summary and/or continuity of care

## 2021-04-28 ENCOUNTER — TELEPHONE (OUTPATIENT)
Dept: FAMILY MEDICINE CLINIC | Age: 66
End: 2021-04-28

## 2021-04-28 NOTE — TELEPHONE ENCOUNTER
Ministerio Cuello calling from [facility] to let you know that pt is c/o pain rt arm above axilla, she states there is some swelling, not red or hot to the touch. Pt has been icing area. She just wanted you to be aware.

## 2021-04-28 NOTE — TELEPHONE ENCOUNTER
Cant taste anything, Eats to keep sugar up but has no real appetite. When she eats her insides feel hot, but she has no fever and shes drinks a lot of water as well as lemon tea and honey. Pt didn't eat anything after 1600 yesterday and her sugar still went up to 391.    Insulin is not enough while on the prednisone

## 2021-04-29 RX ORDER — INSULIN LISPRO 100 [IU]/ML
INJECTION, SOLUTION INTRAVENOUS; SUBCUTANEOUS
Qty: 5 PEN | Refills: 2 | Status: SHIPPED
Start: 2021-04-29 | End: 2022-04-11

## 2021-05-04 ENCOUNTER — OFFICE VISIT (OUTPATIENT)
Dept: FAMILY MEDICINE CLINIC | Age: 66
End: 2021-05-04
Payer: MEDICARE

## 2021-05-04 VITALS
WEIGHT: 220.6 LBS | DIASTOLIC BLOOD PRESSURE: 84 MMHG | HEART RATE: 111 BPM | RESPIRATION RATE: 18 BRPM | SYSTOLIC BLOOD PRESSURE: 136 MMHG | OXYGEN SATURATION: 95 % | BODY MASS INDEX: 40.59 KG/M2 | HEIGHT: 62 IN

## 2021-05-04 DIAGNOSIS — G47.33 OSA AND COPD OVERLAP SYNDROME (HCC): ICD-10-CM

## 2021-05-04 DIAGNOSIS — I25.10 CORONARY ARTERY DISEASE INVOLVING NATIVE CORONARY ARTERY OF NATIVE HEART WITHOUT ANGINA PECTORIS: ICD-10-CM

## 2021-05-04 DIAGNOSIS — J96.11 CHRONIC RESPIRATORY FAILURE WITH HYPOXIA AND HYPERCAPNIA (HCC): ICD-10-CM

## 2021-05-04 DIAGNOSIS — I50.22 CHRONIC SYSTOLIC HEART FAILURE (HCC): ICD-10-CM

## 2021-05-04 DIAGNOSIS — J44.9 OSA AND COPD OVERLAP SYNDROME (HCC): ICD-10-CM

## 2021-05-04 DIAGNOSIS — J96.12 CHRONIC RESPIRATORY FAILURE WITH HYPOXIA AND HYPERCAPNIA (HCC): ICD-10-CM

## 2021-05-04 DIAGNOSIS — M79.601 RIGHT ARM PAIN: ICD-10-CM

## 2021-05-04 DIAGNOSIS — Z09 HOSPITAL DISCHARGE FOLLOW-UP: Primary | ICD-10-CM

## 2021-05-04 PROCEDURE — 3017F COLORECTAL CA SCREEN DOC REV: CPT | Performed by: FAMILY MEDICINE

## 2021-05-04 PROCEDURE — G8417 CALC BMI ABV UP PARAM F/U: HCPCS | Performed by: FAMILY MEDICINE

## 2021-05-04 PROCEDURE — 4004F PT TOBACCO SCREEN RCVD TLK: CPT | Performed by: FAMILY MEDICINE

## 2021-05-04 PROCEDURE — 1090F PRES/ABSN URINE INCON ASSESS: CPT | Performed by: FAMILY MEDICINE

## 2021-05-04 PROCEDURE — G8399 PT W/DXA RESULTS DOCUMENT: HCPCS | Performed by: FAMILY MEDICINE

## 2021-05-04 PROCEDURE — 3023F SPIROM DOC REV: CPT | Performed by: FAMILY MEDICINE

## 2021-05-04 PROCEDURE — 1111F DSCHRG MED/CURRENT MED MERGE: CPT | Performed by: FAMILY MEDICINE

## 2021-05-04 PROCEDURE — 4040F PNEUMOC VAC/ADMIN/RCVD: CPT | Performed by: FAMILY MEDICINE

## 2021-05-04 PROCEDURE — G8427 DOCREV CUR MEDS BY ELIG CLIN: HCPCS | Performed by: FAMILY MEDICINE

## 2021-05-04 PROCEDURE — 99215 OFFICE O/P EST HI 40 MIN: CPT | Performed by: FAMILY MEDICINE

## 2021-05-04 PROCEDURE — 1123F ACP DISCUSS/DSCN MKR DOCD: CPT | Performed by: FAMILY MEDICINE

## 2021-05-04 PROCEDURE — G8926 SPIRO NO PERF OR DOC: HCPCS | Performed by: FAMILY MEDICINE

## 2021-05-04 RX ORDER — GABAPENTIN 100 MG/1
100 CAPSULE ORAL 2 TIMES DAILY
Qty: 60 CAPSULE | Refills: 5 | Status: SHIPPED
Start: 2021-05-04 | End: 2022-04-11

## 2021-05-04 RX ORDER — MEDICAL SUPPLY, MISCELLANEOUS
EACH MISCELLANEOUS
Qty: 1 EACH | Refills: 0 | Status: SHIPPED | OUTPATIENT
Start: 2021-05-04 | End: 2021-07-30

## 2021-05-04 ASSESSMENT — ENCOUNTER SYMPTOMS
SINUS PRESSURE: 0
SINUS PAIN: 0
RHINORRHEA: 0
ABDOMINAL PAIN: 0
COUGH: 1
BACK PAIN: 0
EYE PAIN: 0
SORE THROAT: 0
DIARRHEA: 0
NAUSEA: 0
RECTAL PAIN: 0
SHORTNESS OF BREATH: 1
CONSTIPATION: 1
VOMITING: 0
TROUBLE SWALLOWING: 0

## 2021-05-04 NOTE — PROGRESS NOTES
Post-Discharge Transitional Care Management Services or Hospital Follow Up      Adelia Jones   YOB: 1955    Date of Office Visit:  5/4/2021  Date of Hospital Admission: 4/19/21  Date of Hospital Discharge: 4/24/21  Readmission Risk Score(high >=14%.  Medium >=10%):Readmission Risk Score: 32      Care management risk score Rising risk (score 2-5) and Complex Care (Scores >=6): 7     Non face to face  following discharge, date last encounter closed (first attempt may have been earlier): 4/26/2021  1:39 PM 4/26/2021  1:39 PM    Call initiated 2 business days of discharge: Yes     Patient Active Problem List   Diagnosis    Morbid obesity due to excess calories ([address]    Hyperlipidemia    DAYAN and COPD overlap syndrome ([address]    Vitamin D insufficiency    Chronic combined systolic and diastolic heart failure ([address]    Essential hypertension    GERD (gastroesophageal reflux disease)    Major depressive disorder, recurrent episode, mild ([address]    Diabetic polyneuropathy associated with type 2 diabetes mellitus ([address]    Chronic passive hepatic congestion    Mixed incontinence urge and stress    Chronic back pain - d/t muscle spasm    Glaucoma, open angle    Elevated CA 19-9 level    Lumbar stenosis    Spondylosis 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    Lumbar disc herniation    Asymmetric septal hypertrophy (HCC)    Non-compliance    Hiatal hernia    Melanosis coli    Coronary artery disease involving native coronary artery of native heart without angina pectoris    DM2 (diabetes mellitus, type 2) ([address]    Cigarette smoker    Marijuana use, smoked    Ischemic cardiomyopathy    PVD (peripheral vascular disease) with claudication ([address]    Chronic venous insufficiency    History of non-ST elevation myocardial infarction (NSTEMI)    QT prolongation    Cerebellar infarct ([address]    COPD exacerbation ([address]    Chronic [facility] failure with hypoxia and [facility]capnia [facility]    Acute respiratory failure with hypoxia ([facility]    Controlled type 2 diabetes mellitus without complication ([facility]    Coronavirus infection    E. coli UTI    COPD [facility]chronic obstructive pulmonary disease) ([facility]    Bacterial pneumonia    Hyperkalemia    Uncontrolled type 2 diabetes mellitus with hyperglycemia ([facility])    Status post peripheral artery angioplasty    Uncontrolled hypertension    Hypertensive urgency    Acute respiratory disease due to 2019 novel coronavirus    Pneumonia due to COVID-19 virus    Throat swelling       Allergies   Allergen Reactions    Latex Hives    Bee Venom Anaphylaxis    Dilaudid [Hydromorphone Hcl] Itching    Dye [Iodides] Hives and Shortness Of Breath    Percocet [Oxycodone-Acetaminophen] Shortness Of Breath and Itching    Keflex [Cephalexin] Itching and Rash    Lasix [Furosemide] Other (See Comments)     Pt states she cramps up and gets headaches    Levaquin [Levofloxacin In D5w] Hives    Lipitor      MUSCLE SPASMS    Lyrica [Pregabalin]      Dream disturbances    Morphine Hives    Naproxen      Unsure of reaction;pt states able to take Aleve without difficulties    Nefazodone Other (See Comments)    Norvasc [Amlodipine] Swelling    Oxycodone-Acetaminophen Swelling    Shellfish-Derived Products     Trazodone And Nefazodone        Medications listed as ordered at the time of discharge from hospital   [facility], [address] Medication Instructions RC:    Printed on:05/04/21 1306   Medication Information                      Accu-Chek FastClix Lancets MISC  Use 4x daily             Acetaminophen (TYLENOL) 325 MG CAPS  Take 325 mg by mouth every 6 hours as needed (pain)             albuterol (PROVENTIL) (2.5 MG/3ML) 0.083% nebulizer solution  Take 2.5 mg by nebulization every 6 hours as needed for Wheezing             Alcohol Swabs (CVS ALCOHOL PREP SWABS) 70 % PADS  Use daily             ammonium lactate (LAC-HYDRIN) 12 % lotion  APPLY EVERY DAY AFTER SHOWER             aspirin (ASPIRIN LOW DOSE) 81 MG chewable tablet  TAKE 1 TABLET BY MOUTH EVERY DAY             azelastine (ASTELIN) 0.1 % nasal spray  1 spray by Nasal route 2 times daily Use in each nostril as directed             B-D UF III MINI PEN NEEDLES 31G X 5 MM MISC  USE AS DIRECTED             baclofen (LIORESAL) 10 MG tablet  TAKE 1 TABLET BY MOUTH TWICE A DAY AS NEEDED             BiPAP Machine MISC  by Does not apply route nightly 27/23             Blood Glucose Monitoring Suppl (ACCU-CHEK GUIDE ME) w/Device KIT  Use as directed             blood glucose test strips (ACCU-CHEK GUIDE) strip  1 each by In Vitro route 3 times daily             Blood Pressure Monitoring (B-D ASSURE BPM/AUTO WRIST CUFF) MISC  Use daily             bumetanide (BUMEX) 1 MG tablet  TAKE (1) TABLET BY MOUTH DAILY             calcium carbonate (OSCAL) 500 MG TABS tablet  Take 500 mg by mouth daily             carvedilol (COREG) 12.5 MG tablet  Take 1 tablet by mouth 2 times daily (with meals)             cetirizine (ZYRTEC) 10 MG tablet  TAKE 1 TABLET BY MOUTH EVERY DAY             cholestyramine (QUESTRAN) 4 g packet  Take 1 packet by mouth daily              ciclopirox (LOPROX) 0.77 % cream  Apply topically 2 times daily             CVS GAS RELIEF ULTRA STRENGTH 180 MG CAPS  TAKE 1 CAPSULE BY MOUTH TWICE A DAY             dexamethasone (DECADRON) 2 MG tablet  Take orally 2 tabs from 4/25-4/29 then 1 tab on 4/30-5/4             dilTIAZem (CARDIZEM) 120 MG tablet  Take 120 mg by mouth daily              docusate sodium (COLACE) 100 MG capsule  TAKE 2 CAPSULES BY MOUTH AT BEDTIME             DULoxetine (CYMBALTA) 60 MG extended release capsule  TAKE ONE (1) CAPSULE BY MOUTH TWICE DAILY             ENTRESTO 24-26 MG per tablet  TAKE ONE (1) TABLET BY MOUTH TWICE DAILY             famotidine (PEPCID) 40 MG tablet  Take 40 mg by mouth 2 times daily              fluticasone (FLONASE) 50 MCG/ACT nasal spray  USE 1 SPRAY IN EACH NOSTRIL TWICE A DAY             gabapentin (NEURONTIN) 100 MG capsule  Take 1 capsule by mouth 2 times daily for 180 days. Intended supply: 30 days             Handicap Placard MISC  by Does not apply route Patient cannot walk 200 ft without stopping to rest.    Expiration 8/2022             hydrALAZINE (APRESOLINE) 50 MG tablet  TAKE 1 TABLET BY MOUTH THREE TIMES A DAY             hydrocortisone 2.5 % cream  APPLY TO AFFECTED AREA TWICE DAILY             Insulin Degludec (TRESIBA FLEXTOUCH) 200 UNIT/ML SOPN  INJECT 50 UNITS INTO THE SKIN nightly             insulin lispro, 1 Unit Dial, (HUMALOG KWIKPEN) 100 UNIT/ML SOPN  Use sliding scale as below TID with meals Sugar  0 units Sugar 131-180 2 units Sugar 181-240 4 units Sugar 241-300 6 units Sugar 301-350 8 units Sugar 351-400 10 units Sugar > 400 12 units Call above 500             isosorbide mononitrate (IMDUR) 60 MG extended release tablet  TAKE (1) TABLET BY MOUTH DAILY             ketorolac (ACULAR) 0.5 % ophthalmic solution  1 drop 4 times daily             lidocaine (XYLOCAINE) 5 % ointment  Apply 1 Dose topically as needed (foot) Apply topically as needed. Lidocaine, Anorectal, 5 % CREA  Apply topically as needed             Lift Chair MISC  by Does not apply route Use daily             magnesium oxide (MAG-OX) 400 MG tablet  Take 1 tablet by mouth daily             meloxicam (MOBIC) 7.5 MG tablet  TAKE 1 TABLET BY MOUTH EVERY DAY             Misc. Devices (NOVA CUSHION GEL SEAT PAD) MISC  Use daily             Misc.  Devices MISC  Power wheelchair             montelukast (SINGULAIR) 10 MG tablet  TAKE 1 TABLET BY MOUTH NIGHTLY             Multiple Vitamins-Minerals (THERAPEUTIC MULTIVITAMIN-MINERALS) tablet  Take 1 tablet by mouth daily             MYRBETRIQ 50 MG TB24  TAKE 1 TABLET BY MOUTH EVERY DAY             Nutritional Supplements (GLUCOSE MANAGEMENT) TABS  Use daily prn if low glucose <60             olopatadine (PATADAY) 0.2 % SOLN ophthalmic solution  Place 1 drop into both eyes 2 times daily              omeprazole (PRILOSEC) 40 MG delayed release capsule  Take 40 mg by mouth daily              ondansetron (ZOFRAN) 4 MG tablet  TAKE 1 TABLET BY MOUTH EVERY 6 HOURS AS NEEDED FOR NAUSEA             polyethylene glycol (GLYCOLAX) 17 GM/SCOOP powder  Take 17 g by mouth daily as needed              potassium chloride (KLOR-CON 10) 10 MEQ extended release tablet  Take 10 mEq by mouth daily              senna (SENOKOT) 8.6 MG tablet  Take 1 tablet by mouth daily              simvastatin (ZOCOR) 20 MG tablet  TAKE 1 TABLET BY MOUTH NIGHTLY             SOFT TOUCH LANCETS MISC  Use 3 x daily             SPIRIVA RESPIMAT 1.25 MCG/ACT AERS inhaler  INHALE TWO (2) PUFFS BY MOUTH EVERY DAY             spironolactone (ALDACTONE) 25 MG tablet  Take 1 tablet by mouth daily             traZODone (DESYREL) 100 MG tablet  TAKE (1) TABLET BY MOUTH NIGHTLY             triamcinolone (KENALOG) 0.1 % cream  APPLY TWICE DAILY TO RASH             vitamin D3 (CHOLECALCIFEROL) 25 MCG (1000 UT) TABS tablet  TAKE 1 TABLET BY MOUTH EVERY DAY             zinc 50 MG TABS tablet  Take 1 tablet by mouth daily                   Medications marked \"taking\" at this time  Outpatient Medications Marked as Taking for the 5/4/21 encounter (Office Visit) with Patel Hayes, DO   Medication Sig Dispense Refill    gabapentin (NEURONTIN) 100 MG capsule Take 1 capsule by mouth 2 times daily for 180 days.  Intended supply: 30 days 60 capsule 5    Blood Pressure Monitoring (B-D ASSURE BPM/AUTO WRIST CUFF) MISC Use daily 1 each 0    insulin lispro, 1 Unit Dial, (HUMALOG KWIKPEN) 100 UNIT/ML SOPN Use sliding scale as below TID with meals Sugar  0 units Sugar 131-180 2 units Sugar 181-240 4 units Sugar 241-300 6 units Sugar 301-350 8 units Sugar 351-400 10 units Sugar > 400 12 units Call above 500 5 pen 2    vitamin D3 (CHOLECALCIFEROL) 25 MCG (1000 UT) TABS tablet TAKE 1 TABLET BY MOUTH EVERY DAY 90 tablet 1    zinc 50 MG TABS tablet Take 1 tablet by mouth daily 30 tablet 2    magnesium oxide (MAG-OX) 400 MG tablet Take 1 tablet by mouth daily 30 tablet 2    Acetaminophen (TYLENOL) 325 MG CAPS Take 325 mg by mouth every 6 hours as needed (pain) 30 capsule 0    carvedilol (COREG) 12.5 MG tablet Take 1 tablet by mouth 2 times daily (with meals) 60 tablet 3    dexamethasone (DECADRON) 2 MG tablet Take orally 2 tabs from 4/25-4/29 then 1 tab on 4/30-5/4 15 tablet 0    lidocaine (XYLOCAINE) 5 % ointment Apply 1 Dose topically as needed (foot) Apply topically as needed.       Insulin Degludec (TRESIBA FLEXTOUCH) 200 UNIT/ML SOPN INJECT 50 UNITS INTO THE SKIN nightly 13 pen 2    fluticasone (FLONASE) 50 MCG/ACT nasal spray USE 1 SPRAY IN EACH NOSTRIL TWICE A DAY 1 Bottle 1    cetirizine (ZYRTEC) 10 MG tablet TAKE 1 TABLET BY MOUTH EVERY DAY 90 tablet 1    DULoxetine (CYMBALTA) 60 MG extended release capsule TAKE ONE (1) CAPSULE BY MOUTH TWICE DAILY 60 capsule 1    baclofen (LIORESAL) 10 MG tablet TAKE 1 TABLET BY MOUTH TWICE A DAY AS NEEDED (Patient taking differently: Take 10 mg by mouth 2 times daily as needed ) 180 tablet 1    spironolactone (ALDACTONE) 25 MG tablet Take 1 tablet by mouth daily 30 tablet 11    triamcinolone (KENALOG) 0.1 % cream APPLY TWICE DAILY TO RASH      senna (SENOKOT) 8.6 MG tablet Take 1 tablet by mouth daily       potassium chloride (KLOR-CON 10) 10 MEQ extended release tablet Take 10 mEq by mouth daily       polyethylene glycol (GLYCOLAX) 17 GM/SCOOP powder Take 17 g by mouth daily as needed       omeprazole (PRILOSEC) 40 MG delayed release capsule Take 40 mg by mouth daily       Lidocaine, Anorectal, 5 % CREA Apply topically as needed      famotidine (PEPCID) 40 MG tablet Take 40 mg by mouth 2 times daily       docusate sodium (COLACE) 100 MG capsule TAKE 2 CAPSULES BY MOUTH AT BEDTIME      dilTIAZem (CARDIZEM) 120 MG tablet Take 120 mg by mouth daily       ciclopirox (LOPROX) 0.77 % cream Apply topically 2 times daily      cholestyramine (QUESTRAN) 4 g packet Take 1 packet by mouth daily       ammonium lactate (LAC-HYDRIN) 12 % lotion APPLY EVERY DAY AFTER SHOWER      hydrALAZINE (APRESOLINE) 50 MG tablet TAKE 1 TABLET BY MOUTH THREE TIMES A  tablet 3    meloxicam (MOBIC) 7.5 MG tablet TAKE 1 TABLET BY MOUTH EVERY DAY 90 tablet 1    Misc.  Devices (NOVA CUSHION GEL SEAT PAD) MISC Use daily 1 each 0    aspirin (ASPIRIN LOW DOSE) 81 MG chewable tablet TAKE 1 TABLET BY MOUTH EVERY DAY 90 tablet 1    Alcohol Swabs (CVS ALCOHOL PREP SWABS) 70 % PADS Use daily 1 each 2    blood glucose test strips (ACCU-CHEK GUIDE) strip 1 each by In Vitro route 3 times daily 300 each 11    Accu-Chek FastClix Lancets MISC Use 4x daily 100 each 2    CVS GAS RELIEF ULTRA STRENGTH 180 MG CAPS TAKE 1 CAPSULE BY MOUTH TWICE A DAY      calcium carbonate (OSCAL) 500 MG TABS tablet Take 500 mg by mouth daily      SPIRIVA RESPIMAT 1.25 MCG/ACT AERS inhaler INHALE TWO (2) PUFFS BY MOUTH EVERY DAY 1 Inhaler 5    isosorbide mononitrate (IMDUR) 60 MG extended release tablet TAKE (1) TABLET BY MOUTH DAILY 90 tablet 3    MYRBETRIQ 50 MG TB24 TAKE 1 TABLET BY MOUTH EVERY DAY 90 tablet 1    B-D UF III MINI PEN NEEDLES 31G X 5 MM MISC USE AS DIRECTED 100 each 11    bumetanide (BUMEX) 1 MG tablet TAKE (1) TABLET BY MOUTH DAILY 30 tablet 10    traZODone (DESYREL) 100 MG tablet TAKE (1) TABLET BY MOUTH NIGHTLY 30 tablet 10    montelukast (SINGULAIR) 10 MG tablet TAKE 1 TABLET BY MOUTH NIGHTLY 90 tablet 1    simvastatin (ZOCOR) 20 MG tablet TAKE 1 TABLET BY MOUTH NIGHTLY 90 tablet 3    azelastine (ASTELIN) 0.1 % nasal spray 1 spray by Nasal route 2 times daily Use in each nostril as directed 1 Bottle 3    ENTRESTO 24-26 MG per tablet TAKE ONE (1) TABLET BY MOUTH TWICE DAILY 180 tablet 3    ondansetron (ZOFRAN) 4 MG tablet TAKE 1 TABLET BY MOUTH EVERY 6 HOURS AS NEEDED FOR NAUSEA      Handicap Placard MISC by Does not apply route Patient cannot walk 200 ft without stopping to rest.    Expiration 8/2022 1 each 0    Blood Glucose Monitoring Suppl (ACCU-CHEK GUIDE ME) w/Device KIT Use as directed 1 kit 0    SOFT TOUCH LANCETS MISC Use 3 x daily 100 each 3    ketorolac (ACULAR) 0.5 % ophthalmic solution 1 drop 4 times daily      Misc. Devices MISC Power wheelchair 1 Device 0    Lift Chair MISC by Does not apply route Use daily 1 each 0    Nutritional Supplements (GLUCOSE MANAGEMENT) TABS Use daily prn if low glucose <60 30 tablet 2    hydrocortisone 2.5 % cream APPLY TO AFFECTED AREA TWICE DAILY (Patient taking differently: Apply 1 Dose topically every 6 hours as needed APPLY TO AFFECTED AREA TWICE DAILY, legs) 60 g 2    olopatadine (PATADAY) 0.2 % SOLN ophthalmic solution Place 1 drop into both eyes 2 times daily       Multiple Vitamins-Minerals (THERAPEUTIC MULTIVITAMIN-MINERALS) tablet Take 1 tablet by mouth daily      albuterol (PROVENTIL) (2.5 MG/3ML) 0.083% nebulizer solution Take 2.5 mg by nebulization every 6 hours as needed for Wheezing      BiPAP Machine MISC by Does not apply route nightly 27/23          Medications patient taking as of now reconciled against medications ordered at time of hospital discharge: Yes    Chief Complaint   Patient presents with    Follow-Up from Hospital       HPI    Inpatient course: Discharge summary reviewed- see chart. Interval history/Current status:   Patient was admitted to the hospital due to Covid. She states that she still has no taste or smell. She also states that her insides burn. She has had this for many months now. She states that she is on steroids and her sugars have improved. She has been taking the Humalog sliding scale which has helped bring down her sugars. However she had infiltration of her IV on her right arm. She has had some pain in this arm. She just does not feel well.   She states she smoking she understands she needs to stop smoking for better health    6. Chronic respiratory failure with hypoxia and hypercapnia (HCC)  New pulse ox sent into the pharmacy as well  - DME Order for Pulse Ox as OP        Again we had a long discussion today about her medications and her medication list.  Will obtain list from [facility] as well as [facility] and try to reconcile meds as appropriate. We will try gabapentin in the meantime for continued pain issues. Side effects reviewed patient. She does have an appointment in July which she will keep. If she does have any issues in the meantime she will call for reevaluation    We did call [facility] in exact care and did get an updated medication list for patient. I did update all medications and did discontinue medications that she has not been using. I did also chart review and change medications from GI as well as cardiology. Med list is now up-to-date. Hopefully this will alleviate any issues in the future. I did spend more than 30 minutes coordinating the above.      More than 40 minutes was spent during the encounter, during which more than half the time was spent counseling on the above concerns    Medical Decision Making: moderate complexity

## 2021-05-05 RX ORDER — METOCLOPRAMIDE 5 MG/1
5 TABLET ORAL 3 TIMES DAILY PRN
Qty: 120 TABLET | Refills: 3 | COMMUNITY
Start: 2021-05-05 | End: 2022-04-26

## 2021-05-05 RX ORDER — OMEPRAZOLE 40 MG/1
40 CAPSULE, DELAYED RELEASE ORAL
Qty: 90 CAPSULE | Refills: 1 | Status: ON HOLD | COMMUNITY
Start: 2021-05-05 | End: 2021-06-16 | Stop reason: HOSPADM

## 2021-05-06 ENCOUNTER — TELEPHONE (OUTPATIENT)
Dept: FAMILY MEDICINE CLINIC | Age: 66
End: 2021-05-06

## 2021-05-06 RX ORDER — CARVEDILOL 25 MG/1
12.5 TABLET ORAL 2 TIMES DAILY WITH MEALS
Status: CANCELLED | OUTPATIENT
Start: 2021-05-06

## 2021-05-10 RX ORDER — MIRABEGRON 50 MG/1
TABLET, FILM COATED, EXTENDED RELEASE ORAL
Qty: 90 TABLET | Refills: 1 | Status: SHIPPED
Start: 2021-05-10 | End: 2022-04-11

## 2021-05-10 NOTE — TELEPHONE ENCOUNTER
Per patient she takes 25mg in the morning 12.5 in the afternoon and 25mg at night. Per patient dr barclay put her on the 25mg and the hospital added the 12.5 in the after noon.

## 2021-05-11 RX ORDER — FLUTICASONE PROPIONATE 50 MCG
SPRAY, SUSPENSION (ML) NASAL
Qty: 1 BOTTLE | Refills: 1 | Status: SHIPPED
Start: 2021-05-11 | End: 2022-03-31

## 2021-05-14 ENCOUNTER — TELEPHONE (OUTPATIENT)
Dept: FAMILY MEDICINE CLINIC | Age: 66
End: 2021-05-14

## 2021-05-14 DIAGNOSIS — R05.9 COUGH: Primary | ICD-10-CM

## 2021-05-14 RX ORDER — BENZONATATE 100 MG/1
100 CAPSULE ORAL 3 TIMES DAILY PRN
Qty: 30 CAPSULE | Refills: 0 | Status: SHIPPED | OUTPATIENT
Start: 2021-05-14 | End: 2021-05-21

## 2021-05-14 RX ORDER — LORATADINE 10 MG/1
10 TABLET ORAL DAILY
Status: ON HOLD | COMMUNITY
End: 2021-06-16 | Stop reason: HOSPADM

## 2021-05-14 NOTE — TELEPHONE ENCOUNTER
Pt is being discharged  From [facility].  Patient needs order of Claritin called in

## 2021-05-14 NOTE — TELEPHONE ENCOUNTER
Sent in Daron Johnson per dr. Genoveva Awan. Pt wants an rx for Claritin. States she shouldn't have to pay for stuff when you can order it for free. Please advise.

## 2021-05-14 NOTE — TELEPHONE ENCOUNTER
Loratadine 10mg was added to patients chart and called into [facility] pharmacy per PCP instruction.  #30 with 3 refills

## 2021-05-14 NOTE — TELEPHONE ENCOUNTER
Pt called in because she has a bad cough and a runny nose that's so bad she can hardly eat. I told Pt about walk in care but she said she cant come in today. Can the doctor order something?

## 2021-05-17 ENCOUNTER — HOSPITAL ENCOUNTER (OUTPATIENT)
Dept: CT IMAGING | Age: 66
Discharge: HOME | End: 2021-05-19
Payer: MEDICARE

## 2021-05-17 DIAGNOSIS — R51.9 FACIAL PAIN: ICD-10-CM

## 2021-05-17 DIAGNOSIS — J32.4 CHRONIC PANSINUSITIS: ICD-10-CM

## 2021-05-17 PROCEDURE — 70486 CT MAXILLOFACIAL W/O DYE: CPT

## 2021-05-19 ENCOUNTER — OFFICE VISIT (OUTPATIENT)
Dept: ENT CLINIC | Age: 66
End: 2021-05-19
Payer: MEDICARE

## 2021-05-19 VITALS
DIASTOLIC BLOOD PRESSURE: 89 MMHG | OXYGEN SATURATION: 94 % | TEMPERATURE: 97.2 F | SYSTOLIC BLOOD PRESSURE: 183 MMHG | BODY MASS INDEX: 40.48 KG/M2 | HEIGHT: 62 IN | WEIGHT: 220 LBS | HEART RATE: 90 BPM

## 2021-05-19 DIAGNOSIS — J30.1 SEASONAL ALLERGIC RHINITIS DUE TO POLLEN: ICD-10-CM

## 2021-05-19 DIAGNOSIS — R51.9 FACIAL PAIN: ICD-10-CM

## 2021-05-19 DIAGNOSIS — J32.4 CHRONIC PANSINUSITIS: Primary | ICD-10-CM

## 2021-05-19 PROCEDURE — G8399 PT W/DXA RESULTS DOCUMENT: HCPCS | Performed by: OTOLARYNGOLOGY

## 2021-05-19 PROCEDURE — 1123F ACP DISCUSS/DSCN MKR DOCD: CPT | Performed by: OTOLARYNGOLOGY

## 2021-05-19 PROCEDURE — 1111F DSCHRG MED/CURRENT MED MERGE: CPT | Performed by: OTOLARYNGOLOGY

## 2021-05-19 PROCEDURE — 99213 OFFICE O/P EST LOW 20 MIN: CPT | Performed by: OTOLARYNGOLOGY

## 2021-05-19 PROCEDURE — 3017F COLORECTAL CA SCREEN DOC REV: CPT | Performed by: OTOLARYNGOLOGY

## 2021-05-19 PROCEDURE — G8427 DOCREV CUR MEDS BY ELIG CLIN: HCPCS | Performed by: OTOLARYNGOLOGY

## 2021-05-19 PROCEDURE — G8417 CALC BMI ABV UP PARAM F/U: HCPCS | Performed by: OTOLARYNGOLOGY

## 2021-05-19 PROCEDURE — 4040F PNEUMOC VAC/ADMIN/RCVD: CPT | Performed by: OTOLARYNGOLOGY

## 2021-05-19 PROCEDURE — 4004F PT TOBACCO SCREEN RCVD TLK: CPT | Performed by: OTOLARYNGOLOGY

## 2021-05-19 PROCEDURE — 1090F PRES/ABSN URINE INCON ASSESS: CPT | Performed by: OTOLARYNGOLOGY

## 2021-05-19 RX ORDER — IBUPROFEN 600 MG/1
600 TABLET ORAL EVERY 8 HOURS PRN
Qty: 120 TABLET | Refills: 2 | Status: ON HOLD
Start: 2021-05-19 | End: 2021-06-10

## 2021-05-19 ASSESSMENT — ENCOUNTER SYMPTOMS
EYES NEGATIVE: 1
RHINORRHEA: 1
COUGH: 1
SHORTNESS OF BREATH: 1

## 2021-05-19 NOTE — PROGRESS NOTES
Subjective:      Patient ID:  Ruperto Wilson is a 76 y.o. female. HPI:    Pt returns for review of CT Sinus. There are not changes since last visit. Pt has been on fluticasone (Flonase) for 6 month(s) and is doing marginally    Pt is complaining of sore throat in the right side of her neck which is constant.    Past Medical History:   Diagnosis Date    Asthma     Atherosclerosis of native artery of left leg with rest pain ([address] 11/9/2018    C. difficile diarrhea 2015    CAD (coronary artery disease) 2011    Cellulitis and abscess of trunk 4/14/2015    Cerebellar infarct ([address] 4/3/2019    Remote, rt. cerebellum, head CT scan, 1/9/19    Chronic back pain     Chronic kidney disease     Chronic systolic congestive heart failure ([address] 10/4/2013    Chronic venous insufficiency 10/11/2017    COPD (chronic obstructive pulmonary disease) ([address]     Depression     Diabetes mellitus ([address]     Diabetic neuropathy ([address]     Diverticulosis     Fatty liver 1/8/2016    per US    Glaucoma, open angle 2/1/2016    Mild-OU    Hepatic encephalopathy ([address] 02/07/2016    resolved    Hiatal hernia     History of blood transfusion     Hyperlipidemia     Hyperplastic colon polyp     Hypertension     Incontinence     Liver mass     Morbid obesity ([address]     Movement disorder     Myocardial infarction ([address] 2011    Osteoarthritis, generalized     Pain in both lower legs 10/11/2017    Peripheral vascular disease ([address]     Pneumonia 1/26/2014    Pulmonary edema     resolved    PVD (peripheral vascular disease) with claudication ([address] 8/30/2017    Sleep apnea     uses BI PAP    Status post peripheral artery angioplasty 10/31/2019    Tobacco abuse     Tobacco abuse 10/11/2017    Tubular adenoma polyp of rectum     Urinary tract infection due to ESBL Klebsiella 03/08/2017    Ventral hernia      Past Surgical History:   Procedure Laterality Date    ANGIOPLASTY  11/13/2018    Dr. Destini Shoemaker & athrectomy L SFA & Popliteal    BREAST SURGERY  2000    bilateral reduction    BRONCHIAL BRUSH BIOPSY  1/25/2013    Dr Supa Valerio  04/20/2015    Dr. Jan Irizarry  12/2011     DR. Hermila Briceño,  follows Dr Tim Castillo  11/15/2013    Dr Torsten Melendez    COLONOSCOPY  12/23/2016    Dr Brian Ordaz adenoma & hyperplastic polyps, melanosis coli (repeat one year 12/2017)    CORONARY ARTERY BYPASS GRAFT      ECHO COMPLETE  10/9/2013         ENDOSCOPY, COLON, DIAGNOSTIC  04/18/2017    GALLBLADDER SURGERY      gallstones removed, [facility] 8/2017    HYSTERECTOMY      JOINT REPLACEMENT  2011    LEFT KNEE    KNEE SURGERY      left knee replacement    LAYER WOUND CLOSURE Left [id number]    LIVER BIOPSY  1/8/2016     [facility]    POLYSOMNOGRAPHY  1/2016    [facility]    UPPER GASTROINTESTINAL ENDOSCOPY  12/20/12    UPPER GASTROINTESTINAL ENDOSCOPY  12/23/2016    Dr Eryn Zamudio UPPER GASTROINTESTINAL ENDOSCOPY  02/08/2017     Family History   Problem Relation Age of Onset    Cancer Mother     Asthma Father      Social History     Socioeconomic History    Marital status:      Spouse name: None    Number of children: 2    Years of education: None    Highest education level: None   Occupational History    Occupation: disability   Tobacco Use    Smoking status: Current Every Day Smoker     Packs/day: 0.25     Years: 40.00     Pack years: 10.00     Types: Cigarettes     Start date: 8/10/1974    Smokeless tobacco: Never Used    Tobacco comment: 1 pack every 3 days (6-7 cig)   Vaping Use    Vaping Use: Never used   Substance and Sexual Activity    Alcohol use:  Yes     Alcohol/week: 0.0 standard drinks     Comment: social    Drug use: Yes     Types: Marijuana     Comment: \"every 4th or 5th day out of the week\"    Sexual activity: Never   Other Topics Concern    None   Social History Narrative    Pt lives with brother in her house-pt has never driven a car and depends on transportation     Social Determinants of Health     Financial Resource Strain:     Difficulty of Paying Living Expenses:    Food Insecurity:     Worried About Running Out of Food in the Last Year:     [address] in the Last Year:    Transportation Needs:     Lack of Transportation (Medical):  Lack of Transportation (Non-Medical):    Physical Activity:     Days of Exercise per Week:     Minutes of Exercise per Session:    Stress:     Feeling of Stress :    Social Connections:     Frequency of Communication with Friends and Family:     Frequency of Social Gatherings with Friends and Family:     Attends [employer] Services:     Active Member of Clubs or Organizations:     Attends Club or Organization Meetings:     Marital Status:    Intimate Partner Violence:     Fear of Current or Ex-Partner:     Emotionally Abused:     Physically Abused:     Sexually Abused: Allergies   Allergen Reactions    Latex Hives    Bee Venom Anaphylaxis    Dilaudid [Hydromorphone Hcl] Itching    Dye [Iodides] Hives and Shortness Of Breath    Percocet [Oxycodone-Acetaminophen] Shortness Of Breath and Itching    Keflex [Cephalexin] Itching and Rash    Lasix [Furosemide] Other (See Comments)     Pt states she cramps up and gets headaches    Levaquin [Levofloxacin In D5w] Hives    Lipitor      MUSCLE SPASMS    Lyrica [Pregabalin]      Dream disturbances    Morphine Hives    Naproxen      Unsure of reaction;pt states able to take Aleve without difficulties    Nefazodone Other (See Comments)    Norvasc [Amlodipine] Swelling    Oxycodone-Acetaminophen Swelling    Shellfish-Derived Products     Trazodone And Nefazodone        Review of Systems   Constitutional: Negative. HENT: Positive for congestion, postnasal drip and rhinorrhea. Eyes: Negative. Respiratory: Positive for cough and shortness of breath. Skin: Negative.     Allergic/Immunologic: Positive for environmental allergies. Neurological: Negative. Hematological: Negative. Psychiatric/Behavioral: Negative. All other systems reviewed and are negative. Objective:     Vitals:    05/19/21 1507   BP: (!) 183/89   Pulse: 90   Temp: 97.2 °F (36.2 °C)   SpO2: 94%     Physical Exam  Vitals reviewed. Constitutional:       Appearance: Normal appearance. HENT:      Head: Normocephalic. Right Ear: Tympanic membrane, ear canal and external ear normal.      Left Ear: Tympanic membrane, ear canal and external ear normal.      Nose: Nose normal.      Right Turbinates: Swollen and pale. Left Turbinates: Swollen and pale. Mouth/Throat:      Pharynx: Oropharynx is clear. Uvula midline. Comments: Pt jaw palpated and does have some crepitance on the right and excursion is in midline. Eyes:      Conjunctiva/sclera: Conjunctivae normal.   Cardiovascular:      Rate and Rhythm: Normal rate. Pulses: Normal pulses. Pulmonary:      Effort: Pulmonary effort is normal.   Musculoskeletal:      Cervical back: Normal range of motion and neck supple. Lymphadenopathy:      Cervical: No cervical adenopathy. Skin:     Capillary Refill: Capillary refill takes less than 2 seconds. Neurological:      Mental Status: She is alert and oriented to person, place, and time. CT sinuses (my review)    yes - maxine cells - bilateral  no - tala bullosa -   yes - Enlarged inferior turbinates - bilateral   yes - Obstructed OMC -bilateral   no - maxillary sinus disease -,   no - frontal sinus disease- ,   Left frontal sinus   Absent -  no   Size - Moderate  Right frontal sinus   Absent -  no   Size - Moderate  no - sphenoid sinus disease - ,  yes - Deviated nasal septum - right,  mild   no - Septal Spur -    no - posterior accessory os -,                       Assessment:       Diagnosis Orders   1. Chronic pansinusitis     2. Facial pain     3.  Seasonal allergic rhinitis due to pollen Plan:      Pt was positive for allergies and is going to undergo immunotherapy. I would like to  Have this treatment work first to see if it helps. TMJ  Discussed causes of TMJ and the management of it. Patient is to start a soft diet for the next week with warm compresses to the area and OTC NSAIDS.     Follow up in 6 month(s)

## 2021-05-20 ENCOUNTER — OFFICE VISIT (OUTPATIENT)
Dept: CARDIOLOGY CLINIC | Age: 66
End: 2021-05-20
Payer: MEDICARE

## 2021-05-20 VITALS
HEIGHT: 62 IN | RESPIRATION RATE: 18 BRPM | DIASTOLIC BLOOD PRESSURE: 74 MMHG | HEART RATE: 92 BPM | BODY MASS INDEX: 40.3 KG/M2 | WEIGHT: 219 LBS | SYSTOLIC BLOOD PRESSURE: 130 MMHG

## 2021-05-20 DIAGNOSIS — I50.42 CHRONIC COMBINED SYSTOLIC AND DIASTOLIC HEART FAILURE (HCC): Primary | ICD-10-CM

## 2021-05-20 PROCEDURE — G8427 DOCREV CUR MEDS BY ELIG CLIN: HCPCS | Performed by: INTERNAL MEDICINE

## 2021-05-20 PROCEDURE — 1090F PRES/ABSN URINE INCON ASSESS: CPT | Performed by: INTERNAL MEDICINE

## 2021-05-20 PROCEDURE — 1111F DSCHRG MED/CURRENT MED MERGE: CPT | Performed by: INTERNAL MEDICINE

## 2021-05-20 PROCEDURE — 99214 OFFICE O/P EST MOD 30 MIN: CPT | Performed by: INTERNAL MEDICINE

## 2021-05-20 PROCEDURE — 3017F COLORECTAL CA SCREEN DOC REV: CPT | Performed by: INTERNAL MEDICINE

## 2021-05-20 PROCEDURE — G8417 CALC BMI ABV UP PARAM F/U: HCPCS | Performed by: INTERNAL MEDICINE

## 2021-05-20 PROCEDURE — 93000 ELECTROCARDIOGRAM COMPLETE: CPT | Performed by: INTERNAL MEDICINE

## 2021-05-20 PROCEDURE — 4040F PNEUMOC VAC/ADMIN/RCVD: CPT | Performed by: INTERNAL MEDICINE

## 2021-05-20 PROCEDURE — 1123F ACP DISCUSS/DSCN MKR DOCD: CPT | Performed by: INTERNAL MEDICINE

## 2021-05-20 PROCEDURE — 4004F PT TOBACCO SCREEN RCVD TLK: CPT | Performed by: INTERNAL MEDICINE

## 2021-05-20 PROCEDURE — G8399 PT W/DXA RESULTS DOCUMENT: HCPCS | Performed by: INTERNAL MEDICINE

## 2021-05-20 RX ORDER — MAGNESIUM OXIDE 400 MG/1
TABLET ORAL
Qty: 30 TABLET | Refills: 2 | OUTPATIENT
Start: 2021-05-20

## 2021-05-20 NOTE — PATIENT INSTRUCTIONS
1. She is on guideline directed medical therapy for heart failure with reduced ejection fraction, continue Entresto 24/26 twice a day and Coreg  25 mg p.o. twice daily  2. Continue Bumex to 1 mg po daily  3. Continue rest of the medications including hydralazine 50 mg 3 times a day . 4. She was advised again to stop smoking and told me that she does not want to quit. 5. Follow up 6 months.

## 2021-05-20 NOTE — PROGRESS NOTES
OUTPATIENT CARDIOLOGY FOLLOW-UP    Name: Otoniel Martel    Age: 76 y.o. Primary Care Physician: Heather Raygoza DO    Date of Service: 5/20/2021    Chief Complaint:   Chief Complaint   Patient presents with    Follow-Up from Hospital    Coronary Artery Disease       Interim History:  Mrs. Federica Monzon is a 58-year-old  female with history of for coronary artery disease underwent CABG in 2011 and history of severe LV dysfunction with an EF of 25% based on echocardiogram done in 2013, cardiac cath in 2013 showed no progression of disease, chronic right bundle-branch block, type II diabetes, hypertension, hyperlipidemia, morbid obesity, active tobacco use still smoking about 10 cigarettes a day, marijuana use, obstructive sleep apnea on CPAP and history of severe peripheral vascular disease came for follow-up visit. She was last seen in the office was on 9/17/2020, Since her last evaluation, she progressed from 4/19/2021 to 4/25/2021 with a COVID-19 pneumonia radiotherapy steroids and antibiotics. .  She underwent left popliteal and tibial trunk atherectomy on 11/13/2018 and was initiated on plavix and aspirin. She was discharged home on oxygen and she has been using mainly at nighttime. She denies any chest pain, increased dyspnea or leg edema. She is still smoking about 5 cigarettes a day and does not want to quit. She told me her blood pressure sometimes running high at home. She has been using wrist blood pressure machine. She was was recommended to take Coreg 12.5 mg twice daily but she has been taking 25 mg in the evening and 50 mg in the morning. She denies any dizziness, orthopnea, paroxysmal nocturnal dyspnea or leg edema. She gets cough and congestion whenever she goes out. She has multiple allergies to grass and weeds. She denies any fever or chills. No palpitations, chest pain, syncope or presyncope.  She is complaint with her medications and denies taking any over-the-counter medications. She denies using any other illicit drugs other than marijuana. She told me that she is complaint with her salt and fluid intake. SR on EKG.     Review of Systems:   Cardiac: As per HPI  General: No fever, chills  Pulmonary: As per HPI  HEENT: No visual disturbances, difficult swallowing  GI: No nausea, vomiting  Endocrine: No thyroid disease or DM  Musculoskeletal: PEDROZA x 4, no focal motor deficits  Skin: Intact, no rashes  Neuro/Psych: No headache or seizures    Past Medical History:  Past Medical History:   Diagnosis Date    Asthma     Atherosclerosis of native artery of left leg with rest pain ([address] 11/9/2018    C. difficile diarrhea 2015    CAD (coronary artery disease) 2011    Cellulitis and abscess of trunk 4/14/2015    Cerebellar infarct ([address] 4/3/2019    Remote, rt. cerebellum, head CT scan, 1/9/19    Chronic back pain     Chronic kidney disease     Chronic systolic congestive heart failure ([address] 10/4/2013    Chronic venous insufficiency 10/11/2017    COPD (chronic obstructive pulmonary disease) ([address]     Depression     Diabetes mellitus ([address]     Diabetic neuropathy ([address]     Diverticulosis     Fatty liver 1/8/2016    per US    Glaucoma, open angle 2/1/2016    Mild-OU    Hepatic encephalopathy ([address] 02/07/2016    resolved    Hiatal hernia     History of blood transfusion     Hyperlipidemia     Hyperplastic colon polyp     Hypertension     Incontinence     Liver mass     Morbid obesity ([address]     Movement disorder     Myocardial infarction ([address] 2011    Osteoarthritis, generalized     Pain in both lower legs 10/11/2017    Peripheral vascular disease ([address]     Pneumonia 1/26/2014    Pulmonary edema     resolved    PVD (peripheral vascular disease) with claudication ([address] 8/30/2017    Sleep apnea     uses BI PAP    Status post peripheral artery angioplasty 10/31/2019    Tobacco abuse     Tobacco abuse 10/11/2017    Tubular adenoma polyp of rectum     Urinary tract infection due to ESBL Klebsiella 03/08/2017    Ventral hernia        Past Surgical History:  Past Surgical History:   Procedure Laterality Date    ANGIOPLASTY  11/13/2018    Dr. Chrissy Thomas & athrectomy L SFA & Popliteal    BREAST SURGERY  2000    bilateral reduction    BRONCHIAL BRUSH BIOPSY  1/25/2013    Dr Mustapha Wooten  04/20/2015    Dr. Freddy Her  12/2011     DR. Una Darden,  follows Dr Analilia Jerez  11/15/2013    Dr Monica Jeter    COLONOSCOPY  12/23/2016    Dr Alem Galvin adenoma & hyperplastic polyps, melanosis coli (repeat one year 12/2017)    CORONARY ARTERY BYPASS GRAFT      ECHO COMPLETE  10/9/2013         ENDOSCOPY, COLON, DIAGNOSTIC  04/18/2017    GALLBLADDER SURGERY      gallstones removed, [facility] 8/2017    HYSTERECTOMY      JOINT REPLACEMENT  2011    LEFT KNEE    KNEE SURGERY      left knee replacement    LAYER WOUND CLOSURE Left [id number]   Paola Cobb LIVER BIOPSY  1/8/2016    [facility]    POLYSOMNOGRAPHY  1/2016    [facility]    UPPER GASTROINTESTINAL ENDOSCOPY  12/20/12    UPPER GASTROINTESTINAL ENDOSCOPY  12/23/2016    Dr Ray Franco UPPER GASTROINTESTINAL ENDOSCOPY  02/08/2017       Family History:  Family History   Problem Relation Age of Onset    Cancer Mother     Asthma Father        Social History:  Social History     Socioeconomic History    Marital status:      Spouse name: Not on file    Number of children: 2    Years of education: Not on file    Highest education level: Not on file   Occupational History    Occupation: disability   Tobacco Use    Smoking status: Current Every Day Smoker     Packs/day: 0.25     Years: 40.00     Pack years: 10.00     Types: Cigarettes     Start date: 8/10/1974    Smokeless tobacco: Never Used    Tobacco comment: 1 pack every 3 days (6-7 cig)   Vaping Use    Vaping Use: Never used   Substance and Sexual Activity    Alcohol use:  Yes     Alcohol/week: Machine MISC by Does not apply route nightly 27/23       No current facility-administered medications for this visit. Physical Exam:  /74   Pulse 92   Resp 18   Ht 5' 2\" (1.575 m)   Wt 219 lb (99.3 kg)   LMP 01/01/1990   BMI 40.06 kg/m²   Wt Readings from Last 3 Encounters:   05/20/21 219 lb (99.3 kg)   05/19/21 220 lb (99.8 kg)   05/04/21 220 lb 9.6 oz (100.1 kg)     Appearance: Awake, alert and oriented x 3, no acute respiratory distress she is morbidly obese. Skin: Intact, no rash  Head: Normocephalic, atraumatic  Eyes: EOMI, no conjunctival erythema  ENMT: No pharyngeal erythema, MMM, no rhinorrhea  Neck: Supple, no elevated JVP, no carotid bruits  Lungs: Clear to auscultation bilaterally. No wheezes, rales, or rhonchi.   Cardiac: Regular rate and rhythm, +S1S2, no murmurs apparent  Abdomen: Soft, nontender, +bowel sounds  Extremities: Moves all extremities x 4, no lower extremity edema  Neurologic: No focal motor deficits apparent, normal mood and affect, alert and oriented x 3  Peripheral Pulses: Intact posterior tibial pulses bilaterally    Laboratory Tests:  Lab Results   Component Value Date    CREATININE 1.1 (H) 04/24/2021    BUN 34 (H) 04/24/2021     04/24/2021    K 5.0 04/24/2021    CL 97 (L) 04/24/2021    CO2 30 (H) 04/24/2021     Lab Results   Component Value Date    MG 2.0 04/13/2019     Lab Results   Component Value Date    WBC 7.3 04/24/2021    HGB 13.5 04/24/2021    HCT 42.1 04/24/2021    MCV 97.9 04/24/2021     04/24/2021     Lab Results   Component Value Date    ALT 18 04/19/2021    AST 17 04/19/2021    ALKPHOS 69 04/19/2021    BILITOT 0.4 04/19/2021     Lab Results   Component Value Date    CKTOTAL 88 01/05/2016    CKMB 1.2 01/05/2016    TROPONINI <0.01 04/19/2021    TROPONINI <0.01 03/12/2021    TROPONINI <0.01 09/19/2019     Lab Results   Component Value Date    INR 1.1 04/19/2021    INR 1.0 01/04/2019    INR 1.1 11/13/2018    PROTIME 12.6 (H) 04/19/2021 PROTIME 11.7 01/04/2019    PROTIME 12.3 11/13/2018     Lab Results   Component Value Date    TSH 0.646 02/05/2021     Lab Results   Component Value Date    LABA1C 6.5 (H) 04/20/2021     No results found for: EAG  Lab Results   Component Value Date    CHOL 131 02/05/2021    CHOL 162 06/16/2020    CHOL 222 (H) 06/05/2018     Lab Results   Component Value Date    TRIG 219 (H) 02/05/2021    TRIG 89 06/16/2020    TRIG 84 06/05/2018     Lab Results   Component Value Date    HDL 27 02/05/2021    HDL 46 06/16/2020    HDL 38 06/05/2018     Lab Results   Component Value Date    LDLCALC 60 02/05/2021    LDLCALC 98 06/16/2020    LDLCALC 167 (H) 06/05/2018     Lab Results   Component Value Date    LABVLDL 44 02/05/2021    LABVLDL 18 06/16/2020    LABVLDL 17 06/05/2018     No results found for: CHOLHDLRATIO    Cardiac Tests:  ECG: Normal sinus rhythm, RBBB abnormal ECG        Echocardiogram: 1/4/2017  LVEF 35-45%. LV diffuse hypokinesis,mild MR  TTE- 6/5/2018:LVEF 65%, moderate LVH, stage II diastolic dysfunction,    [id number]-8/[id number]-[id number] 65%, moderate LVH, stage II diastolic dysfunction,    Stress test: 6/05/2018:  1. Pharmacologically-induced perfusion defect involving the lateral   wall, without evidence of reversibility. 2. Diffusely hypokinetic left ventricular wall motion with a focal   area of akinesia involving the mid lateral wall. 3. Left ventricular ejection fraction of 42 %       The 10-year ASCVD risk score (Jeane Rendon., et al., 2013) is: 31.8%    Values used to calculate the score:      Age: 76 years      Sex: Female      Is Non- : Yes      Diabetic: Yes      Tobacco smoker: Yes      Systolic Blood Pressure: 765 mmHg      Is BP treated: Yes      HDL Cholesterol: 27 mg/dL      Total Cholesterol: 131 mg/dL  [facility]-4/20/2015:  IMPRESSION:   1. Atherosclerotic coronary disease. a. Status post remote bypass grafting. Patent SVG to OM circumflex.             b.    Marden Rubinstein to selectively cannulate left internal mammary artery      graft, but there is no  significant disease in the LAD itself. 2.    Moderate global left ventricular systolic dysfunction. ASSESSMENT:  1. Heart failure with an improved ejection fraction with an EF of 40-45%-->65%  2. ACC/AHA, stage C., NYHA functional class II, now euvolemic. Diastolic dysfunction   3. CAD, status post CABG in 2011 LIMA to LAD, SVG to OM 1  4. Morbid obesity with obstructive sleep apnea  5. Hypertension-stable  6. Hyperlipidemia on statin  7. Type II diabetes  8. Tobacco and marijuana use, still smoking about 5 to 6 cigarettes a day. 9. PAD, S/p left popliteal and tibial atherectomy/angioplasty -stable  10. Chronic RBBB. 6.  COVID-19 pneumonia, recovered    Plan:   1. She is on guideline directed medical therapy for heart failure with reduced ejection fraction, continue Entresto 24/26 twice a day and Coreg  25 mg p.o. twice daily  2. Continue Bumex to 1 mg po daily  3. Continue rest of the medications including hydralazine 50 mg 3 times a day . 4. She was advised again to stop smoking and told me that she does not want to quit. 5. Follow up 6 months. The patient's current medication list, allergies, problem list and results of all previously ordered testing were reviewed at today's visit.   Bonnie Wilson MD  [facility]) Cardiology

## 2021-05-26 ENCOUNTER — TELEPHONE (OUTPATIENT)
Dept: FAMILY MEDICINE CLINIC | Age: 66
End: 2021-05-26

## 2021-05-26 NOTE — TELEPHONE ENCOUNTER
Went to see her cardiologist and he asked if she was going to get the Covid Vaccine.  Brittni Buckner would like to know what Dr. Andrea Hanks thinks

## 2021-06-01 RX ORDER — MONTELUKAST SODIUM 10 MG/1
TABLET ORAL
Qty: 30 TABLET | Refills: 1 | Status: SHIPPED
Start: 2021-06-01 | End: 2022-04-11

## 2021-06-02 RX ORDER — DULOXETIN HYDROCHLORIDE 60 MG/1
CAPSULE, DELAYED RELEASE ORAL
Qty: 60 CAPSULE | Refills: 1 | Status: SHIPPED
Start: 2021-06-02 | End: 2022-04-11

## 2021-06-09 ENCOUNTER — APPOINTMENT (OUTPATIENT)
Dept: CT IMAGING | Age: 66
DRG: 190 | End: 2021-06-09
Payer: MEDICARE

## 2021-06-09 ENCOUNTER — HOSPITAL ENCOUNTER (INPATIENT)
Age: 66
LOS: 7 days | Discharge: HOME HEALTH | DRG: 190 | End: 2021-06-16
Attending: EMERGENCY MEDICINE | Admitting: INTERNAL MEDICINE
Payer: MEDICARE

## 2021-06-09 ENCOUNTER — APPOINTMENT (OUTPATIENT)
Dept: GENERAL RADIOLOGY | Age: 66
DRG: 190 | End: 2021-06-09
Payer: MEDICARE

## 2021-06-09 DIAGNOSIS — M48.061 SPINAL STENOSIS OF LUMBAR REGION, UNSPECIFIED WHETHER NEUROGENIC CLAUDICATION PRESENT: ICD-10-CM

## 2021-06-09 DIAGNOSIS — J96.01 ACUTE RESPIRATORY FAILURE WITH HYPOXIA (HCC): Primary | ICD-10-CM

## 2021-06-09 DIAGNOSIS — I10 UNCONTROLLED HYPERTENSION: ICD-10-CM

## 2021-06-09 DIAGNOSIS — J18.9 PNEUMONIA OF BOTH LUNGS DUE TO INFECTIOUS ORGANISM, UNSPECIFIED PART OF LUNG: ICD-10-CM

## 2021-06-09 LAB
ALBUMIN SERPL-MCNC: 3.6 G/DL (ref 3.5–5.2)
ALP BLD-CCNC: 74 U/L (ref 35–104)
ALT SERPL-CCNC: 17 U/L (ref 0–32)
ANION GAP SERPL CALCULATED.3IONS-SCNC: 5 MMOL/L (ref 7–16)
AST SERPL-CCNC: 11 U/L (ref 0–31)
BASOPHILS ABSOLUTE: 0.04 E9/L (ref 0–0.2)
BASOPHILS RELATIVE PERCENT: 0.4 % (ref 0–2)
BILIRUB SERPL-MCNC: 0.3 MG/DL (ref 0–1.2)
BUN BLDV-MCNC: 15 MG/DL (ref 6–23)
CALCIUM SERPL-MCNC: 9.2 MG/DL (ref 8.6–10.2)
CHLORIDE BLD-SCNC: 103 MMOL/L (ref 98–107)
CO2: 32 MMOL/L (ref 22–29)
CREAT SERPL-MCNC: 0.9 MG/DL (ref 0.5–1)
EOSINOPHILS ABSOLUTE: 0.35 E9/L (ref 0.05–0.5)
EOSINOPHILS RELATIVE PERCENT: 3.7 % (ref 0–6)
GFR AFRICAN AMERICAN: >60
GFR NON-AFRICAN AMERICAN: >60 ML/MIN/1.73
GLUCOSE BLD-MCNC: 115 MG/DL (ref 74–99)
HCT VFR BLD CALC: 41.3 % (ref 34–48)
HEMOGLOBIN: 12.2 G/DL (ref 11.5–15.5)
IMMATURE GRANULOCYTES #: 0.04 E9/L
IMMATURE GRANULOCYTES %: 0.4 % (ref 0–5)
LACTIC ACID, SEPSIS: 0.6 MMOL/L (ref 0.5–1.9)
LYMPHOCYTES ABSOLUTE: 1.58 E9/L (ref 1.5–4)
LYMPHOCYTES RELATIVE PERCENT: 16.8 % (ref 20–42)
MCH RBC QN AUTO: 29.4 PG (ref 26–35)
MCHC RBC AUTO-ENTMCNC: 29.5 % (ref 32–34.5)
MCV RBC AUTO: 99.5 FL (ref 80–99.9)
MONOCYTES ABSOLUTE: 0.4 E9/L (ref 0.1–0.95)
MONOCYTES RELATIVE PERCENT: 4.3 % (ref 2–12)
NEUTROPHILS ABSOLUTE: 7 E9/L (ref 1.8–7.3)
NEUTROPHILS RELATIVE PERCENT: 74.4 % (ref 43–80)
PDW BLD-RTO: 14.2 FL (ref 11.5–15)
PLATELET # BLD: 342 E9/L (ref 130–450)
PMV BLD AUTO: 10.2 FL (ref 7–12)
POTASSIUM REFLEX MAGNESIUM: 4.8 MMOL/L (ref 3.5–5)
PRO-BNP: 1889 PG/ML (ref 0–125)
RBC # BLD: 4.15 E12/L (ref 3.5–5.5)
SODIUM BLD-SCNC: 140 MMOL/L (ref 132–146)
TOTAL PROTEIN: 7 G/DL (ref 6.4–8.3)
TROPONIN, HIGH SENSITIVITY: 11 NG/L (ref 0–9)
WBC # BLD: 9.4 E9/L (ref 4.5–11.5)

## 2021-06-09 PROCEDURE — 93005 ELECTROCARDIOGRAM TRACING: CPT | Performed by: EMERGENCY MEDICINE

## 2021-06-09 PROCEDURE — 71045 X-RAY EXAM CHEST 1 VIEW: CPT

## 2021-06-09 PROCEDURE — 99223 1ST HOSP IP/OBS HIGH 75: CPT | Performed by: FAMILY MEDICINE

## 2021-06-09 PROCEDURE — 99285 EMERGENCY DEPT VISIT HI MDM: CPT

## 2021-06-09 PROCEDURE — 2060000000 HC ICU INTERMEDIATE R&B

## 2021-06-09 PROCEDURE — 84484 ASSAY OF TROPONIN QUANT: CPT

## 2021-06-09 PROCEDURE — 85025 COMPLETE CBC W/AUTO DIFF WBC: CPT

## 2021-06-09 PROCEDURE — 94640 AIRWAY INHALATION TREATMENT: CPT

## 2021-06-09 PROCEDURE — 6360000002 HC RX W HCPCS: Performed by: EMERGENCY MEDICINE

## 2021-06-09 PROCEDURE — 2500000003 HC RX 250 WO HCPCS: Performed by: EMERGENCY MEDICINE

## 2021-06-09 PROCEDURE — 6370000000 HC RX 637 (ALT 250 FOR IP): Performed by: EMERGENCY MEDICINE

## 2021-06-09 PROCEDURE — 80053 COMPREHEN METABOLIC PANEL: CPT

## 2021-06-09 PROCEDURE — 84145 PROCALCITONIN (PCT): CPT

## 2021-06-09 PROCEDURE — 83880 ASSAY OF NATRIURETIC PEPTIDE: CPT

## 2021-06-09 PROCEDURE — 96375 TX/PRO/DX INJ NEW DRUG ADDON: CPT

## 2021-06-09 PROCEDURE — 96365 THER/PROPH/DIAG IV INF INIT: CPT

## 2021-06-09 PROCEDURE — 83605 ASSAY OF LACTIC ACID: CPT

## 2021-06-09 PROCEDURE — 6360000004 HC RX CONTRAST MEDICATION: Performed by: RADIOLOGY

## 2021-06-09 PROCEDURE — 0202U NFCT DS 22 TRGT SARS-COV-2: CPT

## 2021-06-09 PROCEDURE — 2580000003 HC RX 258: Performed by: EMERGENCY MEDICINE

## 2021-06-09 PROCEDURE — 71275 CT ANGIOGRAPHY CHEST: CPT

## 2021-06-09 PROCEDURE — 1200000000 HC SEMI PRIVATE

## 2021-06-09 RX ORDER — DIPHENHYDRAMINE HYDROCHLORIDE 50 MG/ML
50 INJECTION INTRAMUSCULAR; INTRAVENOUS ONCE
Status: COMPLETED | OUTPATIENT
Start: 2021-06-09 | End: 2021-06-09

## 2021-06-09 RX ORDER — IPRATROPIUM BROMIDE AND ALBUTEROL SULFATE 2.5; .5 MG/3ML; MG/3ML
1 SOLUTION RESPIRATORY (INHALATION)
Status: COMPLETED | OUTPATIENT
Start: 2021-06-09 | End: 2021-06-09

## 2021-06-09 RX ORDER — METHYLPREDNISOLONE SODIUM SUCCINATE 125 MG/2ML
125 INJECTION, POWDER, LYOPHILIZED, FOR SOLUTION INTRAMUSCULAR; INTRAVENOUS ONCE
Status: COMPLETED | OUTPATIENT
Start: 2021-06-09 | End: 2021-06-09

## 2021-06-09 RX ORDER — BUMETANIDE 1 MG/1
1 TABLET ORAL DAILY
Status: CANCELLED | OUTPATIENT
Start: 2021-06-10

## 2021-06-09 RX ADMIN — WATER 1000 MG: 1 INJECTION INTRAMUSCULAR; INTRAVENOUS; SUBCUTANEOUS at 21:05

## 2021-06-09 RX ADMIN — IOPAMIDOL 75 ML: 755 INJECTION, SOLUTION INTRAVENOUS at 18:42

## 2021-06-09 RX ADMIN — DOXYCYCLINE 100 MG: 100 INJECTION, POWDER, LYOPHILIZED, FOR SOLUTION INTRAVENOUS at 21:05

## 2021-06-09 RX ADMIN — IPRATROPIUM BROMIDE AND ALBUTEROL SULFATE 1 AMPULE: .5; 3 SOLUTION RESPIRATORY (INHALATION) at 17:18

## 2021-06-09 RX ADMIN — METHYLPREDNISOLONE SODIUM SUCCINATE 125 MG: 125 INJECTION, POWDER, FOR SOLUTION INTRAMUSCULAR; INTRAVENOUS at 16:24

## 2021-06-09 RX ADMIN — DIPHENHYDRAMINE HYDROCHLORIDE 50 MG: 50 INJECTION, SOLUTION INTRAMUSCULAR; INTRAVENOUS at 18:00

## 2021-06-09 RX ADMIN — IPRATROPIUM BROMIDE AND ALBUTEROL SULFATE 1 AMPULE: .5; 3 SOLUTION RESPIRATORY (INHALATION) at 17:16

## 2021-06-09 RX ADMIN — IPRATROPIUM BROMIDE AND ALBUTEROL SULFATE 1 AMPULE: .5; 3 SOLUTION RESPIRATORY (INHALATION) at 17:17

## 2021-06-09 ASSESSMENT — PAIN DESCRIPTION - PAIN TYPE: TYPE: ACUTE PAIN

## 2021-06-09 ASSESSMENT — ENCOUNTER SYMPTOMS
ABDOMINAL PAIN: 0
EYE PAIN: 0
DIARRHEA: 0
NAUSEA: 0
BACK PAIN: 0
COUGH: 1
SORE THROAT: 0
VOMITING: 0
SHORTNESS OF BREATH: 1
WHEEZING: 0

## 2021-06-09 ASSESSMENT — PAIN DESCRIPTION - LOCATION: LOCATION: CHEST;HEAD

## 2021-06-09 ASSESSMENT — PAIN SCALES - GENERAL: PAINLEVEL_OUTOF10: 10

## 2021-06-09 NOTE — ED PROVIDER NOTES
HPI   Patient is a 76 y.o. female with a past medical history of cerebral infarct, PVD, CHF, asthma, MI, COPD, peripheral vascular disease presenting to the Emergency Department for shortness of breath and hypoxia. History obtained by patient. Symptoms are severe in severity and constant since onset. They are improved by nothing and worsened by exertion. Patient presents for cough and shortness of breath. She states she was seeing her pulmonologist today as she had Covid back in April. When she arrived to the pulmonology office today she was found to be hypoxic in the 80s and instructed her to come to the ER for evaluation. She is not on oxygen at home. When she had COVID-19 in April she was requiring oxygen in the hospital but was not discharged home on oxygen. Her oxygen parents improved she was never intubated. She states her shortness of breath has been since Covid but states it has worsened over the last 4 days. She states she does have a productive cough as well with yellow/green sputum. She denies any fevers or chills. She denies any pain in her chest.  She has had swelling in her bilateral lower extremities but denies pain. Denies any history of DVT or PE. Denies hemoptysis. Review of Systems   Constitutional: Negative for chills and fever. HENT: Negative for congestion and sore throat. Eyes: Negative for pain and visual disturbance. Respiratory: Positive for cough and shortness of breath. Negative for wheezing. Cardiovascular: Positive for leg swelling. Negative for chest pain. Gastrointestinal: Negative for abdominal pain, diarrhea, nausea and vomiting. Genitourinary: Negative for dysuria and frequency. Musculoskeletal: Negative for arthralgias and back pain. Skin: Negative for rash and wound. Neurological: Negative for weakness and headaches. All other systems reviewed and are negative. Physical Exam  Vitals and nursing note reviewed.    Constitutional: General: She is not in acute distress. Appearance: She is well-developed. HENT:      Head: Normocephalic and atraumatic. Eyes:      Pupils: Pupils are equal, round, and reactive to light. Cardiovascular:      Rate and Rhythm: Normal rate and regular rhythm. Pulmonary:      Effort: Pulmonary effort is normal. No respiratory distress. Breath sounds: Examination of the right-upper field reveals rhonchi. Examination of the left-upper field reveals rhonchi. Rhonchi present. No wheezing or rales. Abdominal:      Palpations: Abdomen is soft. Tenderness: There is no abdominal tenderness. There is no guarding or rebound. Musculoskeletal:      Cervical back: Normal range of motion and neck supple. Right lower leg: Edema present. Left lower leg: Edema present. Skin:     General: Skin is warm and dry. Neurological:      Mental Status: She is alert and oriented to person, place, and time. Coordination: Coordination normal.          Procedures     MDM   Patient presented to the Emergency Department for hypoxia and cough. On initial evaluation patient is hypoxic. Work-up initiated. With recent Covid infection, pulmonary embolism cannot be excluded. Work-up initiated. EKG with no acute ischemic changes and initial troponin resulted at 11. Patient symptoms ongoing for greater than 3 hours. History and physical exam less concerning for ACS. Rest the lab work resulted and essentially unremarkable other than a mild elevation of BNP at 1800. Patient was placed on oxygen and hypoxia did improve. CTA demonstrating no evidence of PE and there is multifocal groundglass opacities throughout the lung consistent with multifocal pneumonia as well as small bilateral pleural effusions and reactive mediastinal nodes. There is also cardiomegaly. With patient's productive cough and CT findings there is concern for pneumonia and she was placed on antibiotics.   Molecular film array was ordered and pending at this time. With patient's new onset hypoxia she would benefit from inpatient evaluation and care. Consult to on-call hospitalist accept the patient for admission. Educated patient on symptoms, diagnosis and work-up in the ER. She was agreeable with plan and admitted.              ----------------------------------------------- PAST HISTORY --------------------------------------------  Past Medical History:  has a past medical history of Asthma, Atherosclerosis of native artery of left leg with rest pain ([address] C. difficile diarrhea, CAD (coronary artery disease), Cellulitis and abscess of trunk, Cerebellar infarct (HCC), Chronic back pain, Chronic kidney disease, Chronic systolic congestive heart failure ([address] Chronic venous insufficiency, COPD (chronic obstructive pulmonary disease) ([address] Depression, Diabetes mellitus ([address] Diabetic neuropathy ([address] Diverticulosis, Fatty liver, Glaucoma, open angle, Hepatic encephalopathy ([address] Hiatal hernia, History of blood transfusion, Hyperlipidemia, Hyperplastic colon polyp, Hypertension, Incontinence, Liver mass, Morbid obesity ([address] Movement disorder, Myocardial infarction ([address] Osteoarthritis, generalized, Pain in both lower legs, Peripheral vascular disease ([address] Pneumonia, Pulmonary edema, PVD (peripheral vascular disease) with claudication ([address] Sleep apnea, Status post peripheral artery angioplasty, Tobacco abuse, Tobacco abuse, Tubular adenoma polyp of rectum, Urinary tract infection due to ESBL Klebsiella, and Ventral hernia. Past Surgical History:  has a past surgical history that includes knee surgery; Upper gastrointestinal endoscopy (12/20/12); Coronary artery bypass graft; layer wound closure (Left, [id number]); Echo Complete (10/9/2013); polysomnography (1/2016); liver biopsy (1/8/2016); bronchial brush biopsy (1/25/2013); Breast surgery (2000); Cholecystectomy (YRS AGO); Cardiac surgery (12/2011);  Cardiac catheterization (04/20/2015); Hysterectomy; joint replacement (2011); Upper gastrointestinal endoscopy (12/23/2016); Colonoscopy (11/15/2013); Colonoscopy (12/23/2016); Upper gastrointestinal endoscopy (02/08/2017); Endoscopy, colon, diagnostic (04/18/2017); Gallbladder surgery; and angioplasty (11/13/2018). Social History:  reports that she has been smoking cigarettes. She started smoking about 46 years ago. She has a 10.00 pack-year smoking history. She has never used smokeless tobacco. She reports current alcohol use. She reports current drug use. Drug: Marijuana. Family History: family history includes Asthma in her father; Cancer in her mother. The patients home medications have been reviewed.     Allergies: Latex, Bee venom, Dilaudid [hydromorphone hcl], Dye [iodides], Percocet [oxycodone-acetaminophen], Keflex [cephalexin], Lasix [furosemide], Levaquin [levofloxacin in d5w], Lipitor, Lyrica [pregabalin], Morphine, Naproxen, Nefazodone, Norvasc [amlodipine], Oxycodone-acetaminophen, Shellfish-derived products, and Trazodone and nefazodone    ------------------------------------------------ RESULTS ---------------------------------------------------    LABS:  Results for orders placed or performed during the hospital encounter of 06/09/21   CBC Auto Differential   Result Value Ref Range    WBC 9.4 4.5 - 11.5 E9/L    RBC 4.15 3.50 - 5.50 E12/L    Hemoglobin 12.2 11.5 - 15.5 g/dL    Hematocrit 41.3 34.0 - 48.0 %    MCV 99.5 80.0 - 99.9 fL    MCH 29.4 26.0 - 35.0 pg    MCHC 29.5 (L) 32.0 - 34.5 %    RDW 14.2 11.5 - 15.0 fL    Platelets 815 670 - 337 E9/L    MPV 10.2 7.0 - 12.0 fL    Neutrophils % 74.4 43.0 - 80.0 %    Immature Granulocytes % 0.4 0.0 - 5.0 %    Lymphocytes % 16.8 (L) 20.0 - 42.0 %    Monocytes % 4.3 2.0 - 12.0 %    Eosinophils % 3.7 0.0 - 6.0 %    Basophils % 0.4 0.0 - 2.0 %    Neutrophils Absolute 7.00 1.80 - 7.30 E9/L    Immature Granulocytes # 0.04 E9/L    Lymphocytes Absolute 1.58 1.50 - 4.00 E9/L Monocytes Absolute 0.40 0.10 - 0.95 E9/L    Eosinophils Absolute 0.35 0.05 - 0.50 E9/L    Basophils Absolute 0.04 0.00 - 0.20 E9/L   Comprehensive Metabolic Panel w/ Reflex to MG   Result Value Ref Range    Sodium 140 132 - 146 mmol/L    Potassium reflex Magnesium 4.8 3.5 - 5.0 mmol/L    Chloride 103 98 - 107 mmol/L    CO2 32 (H) 22 - 29 mmol/L    Anion Gap 5 (L) 7 - 16 mmol/L    Glucose 115 (H) 74 - 99 mg/dL    BUN 15 6 - 23 mg/dL    CREATININE 0.9 0.5 - 1.0 mg/dL    GFR Non-African American >60 >=60 mL/min/1.73    GFR African American >60     Calcium 9.2 8.6 - 10.2 mg/dL    Total Protein 7.0 6.4 - 8.3 g/dL    Albumin 3.6 3.5 - 5.2 g/dL    Total Bilirubin 0.3 0.0 - 1.2 mg/dL    Alkaline Phosphatase 74 35 - 104 U/L    ALT 17 0 - 32 U/L    AST 11 0 - 31 U/L   Troponin   Result Value Ref Range    Troponin, High Sensitivity 11 (H) 0 - 9 ng/L   Brain Natriuretic Peptide   Result Value Ref Range    Pro-BNP 1,889 (H) 0 - 125 pg/mL   Lactate, Sepsis   Result Value Ref Range    Lactic Acid, Sepsis 0.6 0.5 - 1.9 mmol/L   EKG 12 Lead   Result Value Ref Range    Ventricular Rate 84 BPM    Atrial Rate 84 BPM    P-R Interval 180 ms    QRS Duration 142 ms    Q-T Interval 438 ms    QTc Calculation (Bazett) 517 ms    P Axis 56 degrees    R Axis 60 degrees    T Axis 24 degrees       RADIOLOGY:    All Radiology results interpreted by Radiologist unless otherwise noted. CTA PULMONARY W CONTRAST   Final Result   No evidence of pulmonary emboli. Multifocal ground-glass and interstitial airspace disease in the lungs   bilaterally, less extensive than on the prior examination and most consistent   with multifocal pneumonia. Small bilateral pleural effusions, slightly larger on the right. Nonspecific hilar and mediastinal nodes, which may be reactive. Cardiomegaly. XR CHEST PORTABLE   Final Result   Airspace disease with interval improvement on the left and probable slight   progression on the right.  Whether this relates to changing pneumonia or   superimposed pulmonary edema is less than certain. EKG:  This EKG is signed and interpreted by ED Physician. Time:  1525   Rate: 84  Rhythm: Sinus. Interpretation: non-specific EKG. normal axis deviation. Right bundle branch block. QTc 517. . No ST segment elevation. Comparison: stable as compared to patient's most recent EKG.    ---------------------------- NURSING NOTES AND VITALS REVIEWED -------------------------   The nursing notes within the ED encounter and vital signs as below have been reviewed. BP (!) 176/94   Pulse 79   Temp 98.1 °F (36.7 °C)   Resp 16   Ht 5' 2\" (1.575 m)   Wt 230 lb (104.3 kg)   LMP 01/01/1990   SpO2 99%   BMI 42.07 kg/m²   Oxygen Saturation Interpretation: Improved after treatment      ------------------------------------------PROGRESS NOTES -------------------------------------------    ED COURSE MEDICATIONS:                Medications   methylPREDNISolone sodium (SOLU-MEDROL) injection 125 mg (125 mg Intravenous Given 6/9/21 1624)   diphenhydrAMINE (BENADRYL) injection 50 mg (50 mg Intravenous Given 6/9/21 1800)   ipratropium-albuterol (DUONEB) nebulizer solution 1 ampule (1 ampule Inhalation Given 6/9/21 1718)   iopamidol (ISOVUE-370) 76 % injection 75 mL (75 mLs Intravenous Given 6/9/21 1842)   doxycycline (VIBRAMYCIN) 100 mg in dextrose 5 % 100 mL IVPB (0 mg Intravenous Stopped 6/9/21 2239)   cefTRIAXone (ROCEPHIN) 1,000 mg in sterile water 10 mL IV syringe (1,000 mg Intravenous New Bag 6/9/21 2105)       CONSULTATIONS:            Consultation:  I Spoke with Dr. Gerry Tom (Medicine). Discussed case. They will admit this patient. Charline De Leon PROCEDURES:            none.       COUNSELING:   I have spoken with the patient and discussed todays results, in addition to providing specific details for the plan of care and counseling regarding the diagnosis and prognosis.     --------------------------------------- IMPRESSION & DISPOSITION --------------------------------     IMPRESSION(s):  1. Acute respiratory failure with hypoxia ([address]    2. Pneumonia of both lungs due to infectious organism, unspecified part of lung        This patient's ED course included: a personal history and physicial examination, re-evaluation prior to disposition, multiple bedside re-evaluations, cardiac monitoring and continuous pulse oximetry    This patient has been closely monitored during their ED course. DISPOSITION:  Disposition: Admit to telemetry. Patient condition is serious. END OF PROVIDER NOTE.             Amy Stevenson DO  Resident  06/10/21 0002

## 2021-06-09 NOTE — ED NOTES
Bed:  [place] [address]  Expected date:   Expected time:   Means of arrival:   Comments:  EMS     Ran Sinha RN  06/09/21 [id number]

## 2021-06-10 LAB
ADENOVIRUS BY PCR: NOT DETECTED
BORDETELLA PARAPERTUSSIS BY PCR: NOT DETECTED
BORDETELLA PERTUSSIS BY PCR: NOT DETECTED
CHLAMYDOPHILIA PNEUMONIAE BY PCR: NOT DETECTED
CORONAVIRUS 229E BY PCR: NOT DETECTED
CORONAVIRUS HKU1 BY PCR: NOT DETECTED
CORONAVIRUS NL63 BY PCR: NOT DETECTED
CORONAVIRUS OC43 BY PCR: NOT DETECTED
EKG ATRIAL RATE: 84 BPM
EKG P AXIS: 56 DEGREES
EKG P-R INTERVAL: 180 MS
EKG Q-T INTERVAL: 438 MS
EKG QRS DURATION: 142 MS
EKG QTC CALCULATION (BAZETT): 517 MS
EKG R AXIS: 60 DEGREES
EKG T AXIS: 24 DEGREES
EKG VENTRICULAR RATE: 84 BPM
HUMAN METAPNEUMOVIRUS BY PCR: NOT DETECTED
HUMAN RHINOVIRUS/ENTEROVIRUS BY PCR: NOT DETECTED
INFLUENZA A BY PCR: NOT DETECTED
INFLUENZA B BY PCR: NOT DETECTED
METER GLUCOSE: 163 MG/DL (ref 74–99)
METER GLUCOSE: 169 MG/DL (ref 74–99)
METER GLUCOSE: 170 MG/DL (ref 74–99)
METER GLUCOSE: 193 MG/DL (ref 74–99)
MYCOPLASMA PNEUMONIAE BY PCR: NOT DETECTED
PARAINFLUENZA VIRUS 1 BY PCR: NOT DETECTED
PARAINFLUENZA VIRUS 2 BY PCR: NOT DETECTED
PARAINFLUENZA VIRUS 3 BY PCR: NOT DETECTED
PARAINFLUENZA VIRUS 4 BY PCR: NOT DETECTED
PROCALCITONIN: <0.02 NG/ML (ref 0–0.08)
RESPIRATORY SYNCYTIAL VIRUS BY PCR: NOT DETECTED
SARS-COV-2, PCR: NOT DETECTED
TROPONIN, HIGH SENSITIVITY: 9 NG/L (ref 0–9)

## 2021-06-10 PROCEDURE — 1200000000 HC SEMI PRIVATE

## 2021-06-10 PROCEDURE — 6370000000 HC RX 637 (ALT 250 FOR IP): Performed by: EMERGENCY MEDICINE

## 2021-06-10 PROCEDURE — 94640 AIRWAY INHALATION TREATMENT: CPT

## 2021-06-10 PROCEDURE — 6370000000 HC RX 637 (ALT 250 FOR IP): Performed by: FAMILY MEDICINE

## 2021-06-10 PROCEDURE — 87449 NOS EACH ORGANISM AG IA: CPT

## 2021-06-10 PROCEDURE — 84484 ASSAY OF TROPONIN QUANT: CPT

## 2021-06-10 PROCEDURE — 93010 ELECTROCARDIOGRAM REPORT: CPT | Performed by: INTERNAL MEDICINE

## 2021-06-10 PROCEDURE — 6360000002 HC RX W HCPCS: Performed by: FAMILY MEDICINE

## 2021-06-10 PROCEDURE — 82962 GLUCOSE BLOOD TEST: CPT

## 2021-06-10 PROCEDURE — 2500000003 HC RX 250 WO HCPCS: Performed by: FAMILY MEDICINE

## 2021-06-10 PROCEDURE — 87040 BLOOD CULTURE FOR BACTERIA: CPT

## 2021-06-10 PROCEDURE — 94664 DEMO&/EVAL PT USE INHALER: CPT

## 2021-06-10 PROCEDURE — 99233 SBSQ HOSP IP/OBS HIGH 50: CPT | Performed by: FAMILY MEDICINE

## 2021-06-10 PROCEDURE — 2580000003 HC RX 258: Performed by: FAMILY MEDICINE

## 2021-06-10 PROCEDURE — 36415 COLL VENOUS BLD VENIPUNCTURE: CPT

## 2021-06-10 RX ORDER — TROSPIUM CHLORIDE 20 MG/1
20 TABLET, FILM COATED ORAL
Status: DISCONTINUED | OUTPATIENT
Start: 2021-06-10 | End: 2021-06-16 | Stop reason: HOSPADM

## 2021-06-10 RX ORDER — SIMVASTATIN 20 MG
20 TABLET ORAL NIGHTLY
Status: DISCONTINUED | OUTPATIENT
Start: 2021-06-10 | End: 2021-06-16 | Stop reason: HOSPADM

## 2021-06-10 RX ORDER — ACETAMINOPHEN 325 MG/1
650 TABLET ORAL EVERY 6 HOURS PRN
Status: DISCONTINUED | OUTPATIENT
Start: 2021-06-10 | End: 2021-06-16 | Stop reason: HOSPADM

## 2021-06-10 RX ORDER — DOXYCYCLINE HYCLATE 100 MG/1
100 CAPSULE ORAL EVERY 12 HOURS SCHEDULED
Status: DISCONTINUED | OUTPATIENT
Start: 2021-06-10 | End: 2021-06-11

## 2021-06-10 RX ORDER — CETIRIZINE HYDROCHLORIDE 10 MG/1
10 TABLET ORAL DAILY
Status: DISCONTINUED | OUTPATIENT
Start: 2021-06-10 | End: 2021-06-16 | Stop reason: HOSPADM

## 2021-06-10 RX ORDER — NYSTATIN 100000 U/G
OINTMENT TOPICAL 2 TIMES DAILY
Status: COMPLETED | OUTPATIENT
Start: 2021-06-10 | End: 2021-06-13

## 2021-06-10 RX ORDER — ALBUTEROL SULFATE 2.5 MG/3ML
2.5 SOLUTION RESPIRATORY (INHALATION)
Status: DISCONTINUED | OUTPATIENT
Start: 2021-06-10 | End: 2021-06-16 | Stop reason: HOSPADM

## 2021-06-10 RX ORDER — GUAIFENESIN 400 MG/1
400 TABLET ORAL 4 TIMES DAILY PRN
Status: DISCONTINUED | OUTPATIENT
Start: 2021-06-10 | End: 2021-06-16 | Stop reason: HOSPADM

## 2021-06-10 RX ORDER — ATORVASTATIN CALCIUM 10 MG/1
10 TABLET, FILM COATED ORAL DAILY
Status: DISCONTINUED | OUTPATIENT
Start: 2021-06-10 | End: 2021-06-10 | Stop reason: ALTCHOICE

## 2021-06-10 RX ORDER — IPRATROPIUM BROMIDE AND ALBUTEROL SULFATE 2.5; .5 MG/3ML; MG/3ML
1 SOLUTION RESPIRATORY (INHALATION)
Status: DISCONTINUED | OUTPATIENT
Start: 2021-06-10 | End: 2021-06-10

## 2021-06-10 RX ORDER — ONDANSETRON 2 MG/ML
4 INJECTION INTRAMUSCULAR; INTRAVENOUS EVERY 6 HOURS PRN
Status: DISCONTINUED | OUTPATIENT
Start: 2021-06-10 | End: 2021-06-10

## 2021-06-10 RX ORDER — BUMETANIDE 0.25 MG/ML
0.5 INJECTION, SOLUTION INTRAMUSCULAR; INTRAVENOUS DAILY
Status: DISCONTINUED | OUTPATIENT
Start: 2021-06-10 | End: 2021-06-11

## 2021-06-10 RX ORDER — HYDRALAZINE HYDROCHLORIDE 50 MG/1
50 TABLET, FILM COATED ORAL EVERY 8 HOURS
Status: DISCONTINUED | OUTPATIENT
Start: 2021-06-10 | End: 2021-06-16 | Stop reason: HOSPADM

## 2021-06-10 RX ORDER — DULOXETIN HYDROCHLORIDE 60 MG/1
60 CAPSULE, DELAYED RELEASE ORAL 2 TIMES DAILY
Status: DISCONTINUED | OUTPATIENT
Start: 2021-06-10 | End: 2021-06-16 | Stop reason: HOSPADM

## 2021-06-10 RX ORDER — TRAZODONE HYDROCHLORIDE 50 MG/1
100 TABLET ORAL NIGHTLY
Status: DISCONTINUED | OUTPATIENT
Start: 2021-06-10 | End: 2021-06-16 | Stop reason: HOSPADM

## 2021-06-10 RX ORDER — CARVEDILOL 25 MG/1
25 TABLET ORAL ONCE
Status: COMPLETED | OUTPATIENT
Start: 2021-06-10 | End: 2021-06-10

## 2021-06-10 RX ORDER — SPIRONOLACTONE 25 MG/1
25 TABLET ORAL DAILY
Status: DISCONTINUED | OUTPATIENT
Start: 2021-06-10 | End: 2021-06-16 | Stop reason: HOSPADM

## 2021-06-10 RX ORDER — METHYLPREDNISOLONE SODIUM SUCCINATE 40 MG/ML
40 INJECTION, POWDER, LYOPHILIZED, FOR SOLUTION INTRAMUSCULAR; INTRAVENOUS EVERY 12 HOURS
Status: DISCONTINUED | OUTPATIENT
Start: 2021-06-10 | End: 2021-06-11

## 2021-06-10 RX ORDER — ASPIRIN 81 MG/1
81 TABLET, CHEWABLE ORAL DAILY
Status: DISCONTINUED | OUTPATIENT
Start: 2021-06-10 | End: 2021-06-16 | Stop reason: HOSPADM

## 2021-06-10 RX ORDER — IPRATROPIUM BROMIDE AND ALBUTEROL SULFATE 2.5; .5 MG/3ML; MG/3ML
1 SOLUTION RESPIRATORY (INHALATION)
Status: DISCONTINUED | OUTPATIENT
Start: 2021-06-10 | End: 2021-06-16 | Stop reason: HOSPADM

## 2021-06-10 RX ORDER — CARVEDILOL 25 MG/1
25 TABLET ORAL 2 TIMES DAILY WITH MEALS
Status: DISCONTINUED | OUTPATIENT
Start: 2021-06-10 | End: 2021-06-16 | Stop reason: HOSPADM

## 2021-06-10 RX ORDER — ACETAMINOPHEN 650 MG/1
650 SUPPOSITORY RECTAL EVERY 6 HOURS PRN
Status: DISCONTINUED | OUTPATIENT
Start: 2021-06-10 | End: 2021-06-16 | Stop reason: HOSPADM

## 2021-06-10 RX ORDER — MONTELUKAST SODIUM 10 MG/1
10 TABLET ORAL NIGHTLY
Status: DISCONTINUED | OUTPATIENT
Start: 2021-06-10 | End: 2021-06-16 | Stop reason: HOSPADM

## 2021-06-10 RX ORDER — DEXTROSE MONOHYDRATE 25 G/50ML
12.5 INJECTION, SOLUTION INTRAVENOUS PRN
Status: DISCONTINUED | OUTPATIENT
Start: 2021-06-10 | End: 2021-06-16 | Stop reason: HOSPADM

## 2021-06-10 RX ORDER — NICOTINE POLACRILEX 4 MG
15 LOZENGE BUCCAL PRN
Status: DISCONTINUED | OUTPATIENT
Start: 2021-06-10 | End: 2021-06-16 | Stop reason: HOSPADM

## 2021-06-10 RX ORDER — BACLOFEN 10 MG/1
10 TABLET ORAL 2 TIMES DAILY PRN
Status: DISCONTINUED | OUTPATIENT
Start: 2021-06-10 | End: 2021-06-13

## 2021-06-10 RX ORDER — HYDRALAZINE HYDROCHLORIDE 50 MG/1
50 TABLET, FILM COATED ORAL ONCE
Status: COMPLETED | OUTPATIENT
Start: 2021-06-10 | End: 2021-06-10

## 2021-06-10 RX ORDER — DEXTROSE MONOHYDRATE 50 MG/ML
100 INJECTION, SOLUTION INTRAVENOUS PRN
Status: DISCONTINUED | OUTPATIENT
Start: 2021-06-10 | End: 2021-06-16 | Stop reason: HOSPADM

## 2021-06-10 RX ORDER — FLUTICASONE PROPIONATE 50 MCG
1 SPRAY, SUSPENSION (ML) NASAL DAILY
Status: DISCONTINUED | OUTPATIENT
Start: 2021-06-10 | End: 2021-06-16 | Stop reason: HOSPADM

## 2021-06-10 RX ORDER — SODIUM CHLORIDE 9 MG/ML
25 INJECTION, SOLUTION INTRAVENOUS PRN
Status: DISCONTINUED | OUTPATIENT
Start: 2021-06-10 | End: 2021-06-16 | Stop reason: HOSPADM

## 2021-06-10 RX ORDER — SODIUM CHLORIDE 0.9 % (FLUSH) 0.9 %
5-40 SYRINGE (ML) INJECTION EVERY 12 HOURS SCHEDULED
Status: DISCONTINUED | OUTPATIENT
Start: 2021-06-10 | End: 2021-06-16 | Stop reason: HOSPADM

## 2021-06-10 RX ORDER — ALBUTEROL SULFATE 2.5 MG/3ML
2.5 SOLUTION RESPIRATORY (INHALATION) 2 TIMES DAILY
Status: DISCONTINUED | OUTPATIENT
Start: 2021-06-10 | End: 2021-06-10

## 2021-06-10 RX ORDER — SODIUM CHLORIDE 0.9 % (FLUSH) 0.9 %
5-40 SYRINGE (ML) INJECTION PRN
Status: DISCONTINUED | OUTPATIENT
Start: 2021-06-10 | End: 2021-06-16 | Stop reason: HOSPADM

## 2021-06-10 RX ORDER — ONDANSETRON 4 MG/1
4 TABLET, ORALLY DISINTEGRATING ORAL EVERY 8 HOURS PRN
Status: DISCONTINUED | OUTPATIENT
Start: 2021-06-10 | End: 2021-06-10

## 2021-06-10 RX ORDER — CETIRIZINE HYDROCHLORIDE 10 MG/1
10 TABLET ORAL DAILY
Status: DISCONTINUED | OUTPATIENT
Start: 2021-06-10 | End: 2021-06-10 | Stop reason: SDUPTHER

## 2021-06-10 RX ORDER — POLYETHYLENE GLYCOL 3350 17 G/17G
17 POWDER, FOR SOLUTION ORAL DAILY PRN
Status: DISCONTINUED | OUTPATIENT
Start: 2021-06-10 | End: 2021-06-16 | Stop reason: HOSPADM

## 2021-06-10 RX ORDER — METOCLOPRAMIDE 10 MG/1
5 TABLET ORAL 3 TIMES DAILY
Status: DISCONTINUED | OUTPATIENT
Start: 2021-06-10 | End: 2021-06-16 | Stop reason: HOSPADM

## 2021-06-10 RX ORDER — ISOSORBIDE MONONITRATE 60 MG/1
60 TABLET, EXTENDED RELEASE ORAL DAILY
Status: DISCONTINUED | OUTPATIENT
Start: 2021-06-10 | End: 2021-06-16 | Stop reason: HOSPADM

## 2021-06-10 RX ORDER — GABAPENTIN 100 MG/1
100 CAPSULE ORAL 2 TIMES DAILY
Status: DISCONTINUED | OUTPATIENT
Start: 2021-06-10 | End: 2021-06-16 | Stop reason: HOSPADM

## 2021-06-10 RX ADMIN — METOCLOPRAMIDE 5 MG: 10 TABLET ORAL at 20:08

## 2021-06-10 RX ADMIN — HYDRALAZINE HYDROCHLORIDE 50 MG: 50 TABLET ORAL at 12:17

## 2021-06-10 RX ADMIN — IPRATROPIUM BROMIDE AND ALBUTEROL SULFATE 1 AMPULE: .5; 3 SOLUTION RESPIRATORY (INHALATION) at 09:04

## 2021-06-10 RX ADMIN — TROSPIUM CHLORIDE 20 MG: 20 TABLET, FILM COATED ORAL at 17:05

## 2021-06-10 RX ADMIN — CARVEDILOL 25 MG: 25 TABLET, FILM COATED ORAL at 12:17

## 2021-06-10 RX ADMIN — NYSTATIN OINTMENT: 100000 OINTMENT TOPICAL at 20:07

## 2021-06-10 RX ADMIN — DULOXETINE HYDROCHLORIDE 60 MG: 60 CAPSULE, DELAYED RELEASE ORAL at 12:50

## 2021-06-10 RX ADMIN — IPRATROPIUM BROMIDE AND ALBUTEROL SULFATE 1 AMPULE: .5; 3 SOLUTION RESPIRATORY (INHALATION) at 21:05

## 2021-06-10 RX ADMIN — CETIRIZINE HYDROCHLORIDE 10 MG: 10 TABLET, FILM COATED ORAL at 12:17

## 2021-06-10 RX ADMIN — ISOSORBIDE MONONITRATE 60 MG: 60 TABLET, EXTENDED RELEASE ORAL at 12:17

## 2021-06-10 RX ADMIN — INSULIN LISPRO 2 UNITS: 100 INJECTION, SOLUTION INTRAVENOUS; SUBCUTANEOUS at 12:51

## 2021-06-10 RX ADMIN — INSULIN LISPRO 2 UNITS: 100 INJECTION, SOLUTION INTRAVENOUS; SUBCUTANEOUS at 17:07

## 2021-06-10 RX ADMIN — SACUBITRIL AND VALSARTAN 1 TABLET: 24; 26 TABLET, FILM COATED ORAL at 04:10

## 2021-06-10 RX ADMIN — ASPIRIN 81 MG: 81 TABLET, CHEWABLE ORAL at 08:49

## 2021-06-10 RX ADMIN — INSULIN LISPRO 2 UNITS: 100 INJECTION, SOLUTION INTRAVENOUS; SUBCUTANEOUS at 08:53

## 2021-06-10 RX ADMIN — INSULIN LISPRO 1 UNITS: 100 INJECTION, SOLUTION INTRAVENOUS; SUBCUTANEOUS at 20:12

## 2021-06-10 RX ADMIN — GABAPENTIN 100 MG: 100 CAPSULE ORAL at 20:08

## 2021-06-10 RX ADMIN — IPRATROPIUM BROMIDE AND ALBUTEROL SULFATE 1 AMPULE: .5; 3 SOLUTION RESPIRATORY (INHALATION) at 16:35

## 2021-06-10 RX ADMIN — DOXYCYCLINE HYCLATE 100 MG: 100 CAPSULE ORAL at 20:07

## 2021-06-10 RX ADMIN — METOCLOPRAMIDE 5 MG: 10 TABLET ORAL at 17:05

## 2021-06-10 RX ADMIN — SPIRONOLACTONE 25 MG: 25 TABLET ORAL at 12:17

## 2021-06-10 RX ADMIN — METHYLPREDNISOLONE SODIUM SUCCINATE 40 MG: 40 INJECTION, POWDER, LYOPHILIZED, FOR SOLUTION INTRAMUSCULAR; INTRAVENOUS at 20:08

## 2021-06-10 RX ADMIN — FLUTICASONE PROPIONATE 1 SPRAY: 50 SPRAY, METERED NASAL at 12:18

## 2021-06-10 RX ADMIN — CARVEDILOL 25 MG: 25 TABLET, FILM COATED ORAL at 17:05

## 2021-06-10 RX ADMIN — BUMETANIDE 0.5 MG: 0.25 INJECTION INTRAMUSCULAR; INTRAVENOUS at 08:49

## 2021-06-10 RX ADMIN — WATER 1000 MG: 1 INJECTION INTRAMUSCULAR; INTRAVENOUS; SUBCUTANEOUS at 20:07

## 2021-06-10 RX ADMIN — GABAPENTIN 100 MG: 100 CAPSULE ORAL at 12:17

## 2021-06-10 RX ADMIN — TROSPIUM CHLORIDE 20 MG: 20 TABLET, FILM COATED ORAL at 12:50

## 2021-06-10 RX ADMIN — ENOXAPARIN SODIUM 40 MG: 40 INJECTION SUBCUTANEOUS at 08:49

## 2021-06-10 RX ADMIN — TRAZODONE HYDROCHLORIDE 100 MG: 50 TABLET ORAL at 20:07

## 2021-06-10 RX ADMIN — METHYLPREDNISOLONE SODIUM SUCCINATE 40 MG: 40 INJECTION, POWDER, LYOPHILIZED, FOR SOLUTION INTRAMUSCULAR; INTRAVENOUS at 08:49

## 2021-06-10 RX ADMIN — SACUBITRIL AND VALSARTAN 1 TABLET: 24; 26 TABLET, FILM COATED ORAL at 12:51

## 2021-06-10 RX ADMIN — METOCLOPRAMIDE 5 MG: 10 TABLET ORAL at 12:18

## 2021-06-10 RX ADMIN — MICONAZOLE NITRATE: 2 POWDER TOPICAL at 20:30

## 2021-06-10 RX ADMIN — Medication 10 ML: at 08:49

## 2021-06-10 RX ADMIN — Medication 10 ML: at 20:10

## 2021-06-10 RX ADMIN — HYDRALAZINE HYDROCHLORIDE 50 MG: 50 TABLET ORAL at 17:05

## 2021-06-10 RX ADMIN — SACUBITRIL AND VALSARTAN 1 TABLET: 24; 26 TABLET, FILM COATED ORAL at 20:10

## 2021-06-10 RX ADMIN — HYDRALAZINE HYDROCHLORIDE 50 MG: 50 TABLET, FILM COATED ORAL at 04:10

## 2021-06-10 RX ADMIN — CARVEDILOL 25 MG: 25 TABLET, FILM COATED ORAL at 04:10

## 2021-06-10 RX ADMIN — DULOXETINE HYDROCHLORIDE 60 MG: 60 CAPSULE, DELAYED RELEASE ORAL at 20:07

## 2021-06-10 RX ADMIN — MONTELUKAST SODIUM 10 MG: 10 TABLET, FILM COATED ORAL at 20:07

## 2021-06-10 ASSESSMENT — PAIN - FUNCTIONAL ASSESSMENT: PAIN_FUNCTIONAL_ASSESSMENT: ACTIVITIES ARE NOT PREVENTED

## 2021-06-10 ASSESSMENT — PAIN DESCRIPTION - DESCRIPTORS: DESCRIPTORS: DULL;ACHING

## 2021-06-10 ASSESSMENT — PAIN SCALES - GENERAL
PAINLEVEL_OUTOF10: 0
PAINLEVEL_OUTOF10: 0
PAINLEVEL_OUTOF10: 8
PAINLEVEL_OUTOF10: 0

## 2021-06-10 ASSESSMENT — PAIN DESCRIPTION - PROGRESSION: CLINICAL_PROGRESSION: GRADUALLY WORSENING

## 2021-06-10 ASSESSMENT — PAIN DESCRIPTION - ONSET: ONSET: GRADUAL

## 2021-06-10 ASSESSMENT — PAIN DESCRIPTION - FREQUENCY: FREQUENCY: INTERMITTENT

## 2021-06-10 ASSESSMENT — PAIN DESCRIPTION - ORIENTATION: ORIENTATION: MID

## 2021-06-10 ASSESSMENT — PAIN DESCRIPTION - PAIN TYPE: TYPE: ACUTE PAIN

## 2021-06-10 ASSESSMENT — PAIN DESCRIPTION - LOCATION: LOCATION: ABDOMEN

## 2021-06-10 NOTE — H&P
[facility] History and Physical      CHIEF COMPLAINT: Shortness of breath     History of Present Illness:  76 y.o. female with a past medical history of cerebral infarct, PVD, CHF, asthma, MI, COPD, DAYAN  peripheral vascular disease. Patient present to ED due to increased shortness of breath and coughing . Patient had COVID 19 in April 2021 . She states that since that time she has been very short of breath . She states that shortness of breath is worsened by exertion . During admission in April 2021 patient required oxygen in the hospital but was not discharged home with oxygen . patient was not intubated as well. She reports coughing productive  green sputum . She went to  pulmonology and was hypoxic at 84 % on room air . She was told to come to ED . She  reports no fever or chills  On arrival in ED she was placed on 4-5 liters of oxygen and was saturating >90% . WBc was 9.4 lactic acid 0.6 Trop 11 glucose 115 CTA revealed no PE but multifocal pneumonia . BNP was 1889 .  Patient received Doxycycline and Rocephin in the ED         REVIEW OF SYSTEMS:  A comprehensive review of systems was negative except for: what is in the HPI      PMH:  Past Medical History:   Diagnosis Date    Asthma     Atherosclerosis of native artery of left leg with rest pain ([address] 11/9/2018    C. difficile diarrhea 2015    CAD (coronary artery disease) 2011    Cellulitis and abscess of trunk 4/14/2015    Cerebellar infarct ([address] 4/3/2019    Remote, rt. cerebellum, head CT scan, 1/9/19    Chronic back pain     Chronic kidney disease     Chronic systolic congestive heart failure ([address] 10/4/2013    Chronic venous insufficiency 10/11/2017    COPD (chronic obstructive pulmonary disease) ([address]     Depression     Diabetes mellitus ([address]     Diabetic neuropathy ([address]     Diverticulosis     Fatty liver 1/8/2016    per US    Glaucoma, open angle 2/1/2016    Mild-OU    Hepatic encephalopathy ([address] 02/07/2016 resolved    Hiatal hernia     History of blood transfusion     Hyperlipidemia     Hyperplastic colon polyp     Hypertension     Incontinence     Liver mass     Morbid obesity ([address]     Movement disorder     Myocardial infarction ([address] 2011    Osteoarthritis, generalized     Pain in both lower legs 10/11/2017    Peripheral vascular disease ([address]     Pneumonia 1/26/2014    Pulmonary edema     resolved    PVD (peripheral vascular disease) with claudication ([address] 8/30/2017    Sleep apnea     uses BI PAP    Status post peripheral artery angioplasty 10/31/2019    Tobacco abuse     Tobacco abuse 10/11/2017    Tubular adenoma polyp of rectum     Urinary tract infection due to ESBL Klebsiella 03/08/2017    Ventral hernia        Surgical History:  Past Surgical History:   Procedure Laterality Date    ANGIOPLASTY  11/13/2018    Dr. Latricia Polanco & athrectomy L SFA & Popliteal    BREAST SURGERY  2000    bilateral reduction    BRONCHIAL BRUSH BIOPSY  1/25/2013    Dr Homero Mccormick  04/20/2015    Dr. Suarez Rolling  12/2011     DR. Constantino Muñoz,  follows Dr Jakub Olguin  11/15/2013    Dr Nadyne Schaumann COLONOSCOPY  12/23/2016    Dr Choe Lute adenoma & hyperplastic polyps, melanosis coli (repeat one year 12/2017)    CORONARY ARTERY BYPASS GRAFT      ECHO COMPLETE  10/9/2013         ENDOSCOPY, COLON, DIAGNOSTIC  04/18/2017    GALLBLADDER SURGERY      gallstones removed, [facility] 8/2017    HYSTERECTOMY      JOINT REPLACEMENT  2011    LEFT KNEE    KNEE SURGERY      left knee replacement    LAYER WOUND CLOSURE Left [id number]    LIVER BIOPSY  1/8/2016     E[facility]    POLYSOMNOGRAPHY  1/2016    [facility]    UPPER GASTROINTESTINAL ENDOSCOPY  12/20/12    UPPER GASTROINTESTINAL ENDOSCOPY  12/23/2016    Dr Nadyne Schaumann UPPER GASTROINTESTINAL ENDOSCOPY  02/08/2017       Medications Prior to Admission:    Prior to Admission medications Medication Sig Start Date End Date Taking? Authorizing Provider   DULoxetine (CYMBALTA) 60 MG extended release capsule TAKE ONE (1) CAPSULE BY MOUTH TWICE DAILY 6/2/21   Leila Hayes DO   montelukast (SINGULAIR) 10 MG tablet TAKE (1) TABLET BY MOUTH NIGHTLY 6/1/21   Leila Hayes DO   ibuprofen (ADVIL;MOTRIN) 600 MG tablet Take 1 tablet by mouth every 8 hours as needed for Pain 5/19/21   Maria G Diaz DO   loratadine (CLARITIN) 10 MG tablet Take 10 mg by mouth daily    Historical Provider, MD   fluticasone (FLONASE) 50 MCG/ACT nasal spray USE 1 SPRAY IN EACH NOSTRIL TWICE A DAY 5/11/21   Leila Hayes DO   MYRBETRIQ 50 MG TB24 TAKE 1 TABLET BY MOUTH EVERY DAY 5/10/21   Leila Hayes DO   omeprazole (PRILOSEC) 40 MG delayed release capsule Take 1 capsule by mouth every morning (before breakfast) 5/5/21   Leila Hayes DO   metoclopramide (REGLAN) 5 MG tablet Take 1 tablet by mouth 3 times daily 5/5/21   Leila Hayes DO   gabapentin (NEURONTIN) 100 MG capsule Take 1 capsule by mouth 2 times daily for 180 days.  Intended supply: 30 days 5/4/21 10/31/21  Leila Hayes DO   Blood Pressure Monitoring (B-D ASSURE BPM/AUTO WRIST CUFF) MISC Use daily 5/4/21   Leila Hayes DO   Acetaminophen (TYLENOL) 325 MG CAPS Take 325 mg by mouth every 6 hours as needed (pain) 5/4/21   Leila Hayes DO   insulin lispro, 1 Unit Dial, ([facility]) 100 UNIT/ML SOPN Use sliding scale as below TID with meals Sugar  0 units Sugar 131-180 2 units Sugar 181-240 4 units Sugar 241-300 6 units Sugar 301-350 8 units Sugar 351-400 10 units Sugar > 400 12 units Call above 500 4/29/21   Leila Hayes DO   vitamin D3 (CHOLECALCIFEROL) 25 MCG (1000 UT) TABS tablet TAKE 1 TABLET BY MOUTH EVERY DAY 4/26/21   Leila Hyaes,    zinc 50 MG TABS tablet Take 1 tablet by mouth daily 4/26/21   Leila Hayes,    magnesium oxide (MAG-OX) 400 MG tablet Take 1 tablet by mouth daily 4/26/21   Sherin Hayes DO   carvedilol (COREG) 12.5 MG tablet Take 1 tablet by mouth 2 times daily (with meals)  Patient taking differently: Take 25 mg by mouth 2 times daily (with meals)  4/24/21   Olman Grover DO   Insulin Degludec (TRESIBA FLEXTOUCH) 200 UNIT/ML SOPN INJECT 50 UNITS INTO THE SKIN nightly 4/15/21   Sherin Hayes DO   cetirizine (ZYRTEC) 10 MG tablet TAKE 1 TABLET BY MOUTH EVERY DAY 4/13/21   Sherin Hayes DO   baclofen (LIORESAL) 10 MG tablet TAKE 1 TABLET BY MOUTH TWICE A DAY AS NEEDED  Patient taking differently: Take 10 mg by mouth 2 times daily as needed  4/6/21   Sherin Hayes DO   spironolactone (ALDACTONE) 25 MG tablet Take 1 tablet by mouth daily 3/16/21   Joaquina Fernandez MD   famotidine (PEPCID) 40 MG tablet Take 40 mg by mouth 2 times daily  3/11/21   Historical Provider, MD   docusate sodium (COLACE) 100 MG capsule TAKE 2 CAPSULES BY MOUTH AT BEDTIME 3/11/21   Historical Provider, MD   hydrALAZINE (APRESOLINE) 50 MG tablet TAKE 1 TABLET BY MOUTH THREE TIMES A DAY 2/23/21   Sherin Hayes DO   aspirin (ASPIRIN LOW DOSE) 81 MG chewable tablet TAKE 1 TABLET BY MOUTH EVERY DAY 1/5/21   Sherin Hayes DO   Alcohol Swabs ([employer] ALCOHOL PREP SWABS) 70 % PADS Use daily 12/29/20   Sherin Hayes DO   blood glucose test strips (ACCU-CHEK GUIDE) strip 1 each by In Vitro route 3 times daily 12/29/20   Sherin Hayes DO   Accu-Chek FastClix Lancets MISC Use 4x daily 12/28/20   Sherin Hayes DO   SPIRIVA RESPIMAT 1.25 MCG/ACT AERS inhaler INHALE TWO (2) PUFFS BY MOUTH EVERY DAY 11/24/20   Sherin Hayes DO   isosorbide mononitrate (IMDUR) 60 MG extended release tablet TAKE (1) TABLET BY MOUTH DAILY 11/18/20   Joaquina Fernandez MD   bumetanide (BUMEX) 1 MG tablet TAKE (1) TABLET BY MOUTH DAILY 10/13/20   Sherin Hayes,    traZODone (DESYREL) 100 MG tablet TAKE (1) TABLET BY MOUTH NIGHTLY 10/13/20   Sherin Hayes DO   simvastatin (ZOCOR) 20 MG tablet TAKE 1 TABLET BY MOUTH NIGHTLY 10/6/20   Ha Sapp MD   azelastine (ASTELIN) 0.1 % nasal spray 1 spray by Nasal route 2 times daily Use in each nostril as directed 9/16/20   Christen Diaz DO   ENTRESTO 24-26 MG per tablet TAKE ONE (1) TABLET BY MOUTH TWICE DAILY 9/9/20   Ha Sapp MD   Handicap Placard MISC by Does not apply route Patient cannot walk 200 ft without stopping to rest.    Expiration 8/2022 7/30/20   Madisyn Officemoise Hayes DO   Blood Glucose Monitoring Suppl (ACCU-CHEK GUIDE ME) w/Device KIT Use as directed 4/10/20   Madisyn Officemoise Hayes DO   BiPAP Machine MISC by Does not apply route nightly 27/23    Stefano Haji MD       Allergies:    Latex, Bee venom, Dilaudid [hydromorphone hcl], Dye [iodides], Percocet [oxycodone-acetaminophen], Keflex [cephalexin], Lasix [furosemide], Levaquin [levofloxacin in d5w], Lipitor, Lyrica [pregabalin], Morphine, Naproxen, Nefazodone, Norvasc [amlodipine], Oxycodone-acetaminophen, Shellfish-derived products, and Trazodone and nefazodone    Social History:    reports that she has been smoking cigarettes. She started smoking about 46 years ago. She has a 10.00 pack-year smoking history. She has never used smokeless tobacco. She reports current alcohol use. She reports current drug use. Drug: Marijuana. Family History:   family history includes Asthma in her father; Cancer in her mother.        PHYSICAL EXAM:  Vitals:  BP (!) 176/94   Pulse 79   Temp 98.1 °F (36.7 °C)   Resp 16   Ht 5' 2\" (1.575 m)   Wt 230 lb (104.3 kg)   LMP 01/01/1990   SpO2 99%   BMI 42.07 kg/m²     General Appearance: alert and oriented to person, place and time and in no acute distress  Skin: warm and dry  Head: normocephalic and atraumatic  Eyes: pupils equal, round, and reactive to light, extraocular eye movements intact, conjunctivae normal  Neck: neck supple and non tender without mass   Pulmonary/Chest: on 5L NC rhonchi inupper right lung Cardiovascular: normal rate, normal S1 and S2 and no carotid bruits  Abdomen: soft, non-tender, non-distended, normal bowel sounds, no masses or organomegaly  Extremities:  Edema bilateral  extremities no cyanosis, no clubbing   Neurologic: no cranial nerve deficit and speech normal        LABS:  Recent Labs     06/09/21  1529      K 4.8      CO2 32*   BUN 15   CREATININE 0.9   GLUCOSE 115*   CALCIUM 9.2       Recent Labs     06/09/21  1529   WBC 9.4   RBC 4.15   HGB 12.2   HCT 41.3   MCV 99.5   MCH 29.4   MCHC 29.5*   RDW 14.2      MPV 10.2     No evidence of pulmonary emboli.       Multifocal ground-glass and interstitial airspace disease in the lungs   bilaterally, less extensive than on the prior examination and most consistent   with multifocal pneumonia.       Small bilateral pleural effusions, slightly larger on the right.       Nonspecific hilar and mediastinal nodes, which may be reactive.       Cardiomegaly. Airspace disease with interval improvement on the left and probable slight   progression on the right. Whether this relates to changing pneumonia or   superimposed pulmonary edema is less than certain. No results for input(s): POCGLU in the last 72 hours. Radiology:   CTA PULMONARY W CONTRAST   Final Result   No evidence of pulmonary emboli. Multifocal ground-glass and interstitial airspace disease in the lungs   bilaterally, less extensive than on the prior examination and most consistent   with multifocal pneumonia. Small bilateral pleural effusions, slightly larger on the right. Nonspecific hilar and mediastinal nodes, which may be reactive. Cardiomegaly. XR CHEST PORTABLE   Final Result   Airspace disease with interval improvement on the left and probable slight   progression on the right. Whether this relates to changing pneumonia or   superimposed pulmonary edema is less than certain.                  ASSESSMENT:      Active

## 2021-06-10 NOTE — PROGRESS NOTES
[facility] Quality Flow/Interdisciplinary Rounds Progress Note        Quality Flow Rounds held on Therese 10, 2021    Disciplines Attending:  Bedside Nurse, ,  and Nursing Unit Leadership    Reyes Bill was admitted on 6/9/2021  2:35 PM    Anticipated Discharge Date:  Expected Discharge Date: 06/14/21    Disposition:    Jadon Score:  Jadon Scale Score: 18    Readmission Risk              Risk of Unplanned Readmission:  33           Discussed patient goal for the day, patient clinical progression, and barriers to discharge. The following Goal(s) of the Day/Commitment(s) have been identified: Continue IV Rocephin daily, PO doxycycline Q12, IV Bumex daily, and IV Solumedrol Q12, maintain oxygenation at 5L, which is her baseline, and discharge planning home vs SNF.       Jean-Claude Franco RN  Therese 10, 2021
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
## 2021-06-10 NOTE — PROGRESS NOTES
[facility] Progress Note    Admitting Date and Time: 6/9/2021  2:35 PM  Admit Dx: Acute respiratory failure with hypoxia ([address] [J96.01]    Subjective:  Patient is being followed for Acute respiratory failure with hypoxia ([address] [J96.01]     Pt feels needs something for rash under her pannus. Reports a cough. Per RN: Respiratory panel negative procalcitonin negative. On Rocephin and doxy for pneumonia. ROS: denies fever, chills, cp, sob, n/v, HA unless stated above.       aspirin  81 mg Oral Daily    carvedilol  25 mg Oral BID WC    cetirizine  10 mg Oral Daily    DULoxetine  60 mg Oral BID    sacubitril-valsartan  1 tablet Oral BID    fluticasone  1 spray Each Nostril Daily    gabapentin  100 mg Oral BID    hydrALAZINE  50 mg Oral Q8H    sodium chloride flush  5-40 mL Intravenous 2 times per day    enoxaparin  40 mg Subcutaneous Daily    cefTRIAXone (ROCEPHIN) IV  1,000 mg Intravenous Q24H    And    doxycycline hyclate  100 mg Oral 2 times per day    methylPREDNISolone  40 mg Intravenous Q12H    insulin lispro  0-12 Units Subcutaneous TID WC    insulin lispro  0-6 Units Subcutaneous Nightly    isosorbide mononitrate  60 mg Oral Daily    metoclopramide  5 mg Oral TID    trospium  20 mg Oral BID AC    montelukast  10 mg Oral Nightly    spironolactone  25 mg Oral Daily    traZODone  100 mg Oral Nightly    bumetanide  0.5 mg Intravenous Daily    simvastatin  20 mg Oral Nightly    albuterol  2.5 mg Nebulization BID    nystatin   Topical BID    miconazole   Topical BID    ipratropium-albuterol  1 ampule Inhalation Q4H WA     baclofen, 10 mg, BID PRN  sodium chloride flush, 5-40 mL, PRN  sodium chloride, 25 mL, PRN  polyethylene glycol, 17 g, Daily PRN  acetaminophen, 650 mg, Q6H PRN   Or  acetaminophen, 650 mg, Q6H PRN  guaiFENesin, 400 mg, 4x Daily PRN  glucose, 15 g, PRN  dextrose, 12.5 g, PRN  glucagon (rDNA), 1 mg, PRN  dextrose, 100 mL/hr, PRN  albuterol, 2.5 mg, Q2H PRN         Objective:    BP (!) 149/88   Pulse 79   Temp 98.1 °F (36.7 °C) (Oral)   Resp 20   Ht 5' 2\" (1.575 m)   Wt 230 lb 14.4 oz (104.7 kg)   LMP 01/01/1990   SpO2 92%   BMI 42.23 kg/m²     General Appearance: alert and oriented to person, place and time and in mild acute distress; short of breath with conversation   Skin: warm and dry  Head: normocephalic and atraumatic  Eyes: pupils equal, round, and reactive to light, extraocular eye movements intact, conjunctivae normal  Neck: neck supple and non tender without mass   Pulmonary/Chest: clear to auscultation bilaterally- no wheezes, rales or rhonchi, normal air movement, mild respiratory distress  Cardiovascular: normal rate, normal S1 and S2 and no carotid bruits  Abdomen: soft, non-tender, non-distended, normal bowel sounds, no masses or organomegaly  Extremities: no cyanosis, no clubbing and no edema  Neurologic: no cranial nerve deficit and speech normal        Recent Labs     06/09/21  1529      K 4.8      CO2 32*   BUN 15   CREATININE 0.9   GLUCOSE 115*   CALCIUM 9.2       Recent Labs     06/09/21  1529   WBC 9.4   RBC 4.15   HGB 12.2   HCT 41.3   MCV 99.5   MCH 29.4   MCHC 29.5*   RDW 14.2      MPV 10.2       Radiology:   XR CHEST PORTABLE    Result Date: 6/9/2021  EXAMINATION: ONE XRAY VIEW OF THE CHEST 6/9/2021 2:53 pm COMPARISON: 19 April 2021 HISTORY: ORDERING SYSTEM PROVIDED HISTORY: hypoxia TECHNOLOGIST PROVIDED HISTORY: Reason for exam:->hypoxia FINDINGS: Prior median sternotomy. Heart is enlarged. Pulmonary vascularity appears mildly congested. Airspace disease on the left appears diminished. Airspace disease on the right may be minimally progressive. Neither costophrenic angle is blunted. Airspace disease with interval improvement on the left and probable slight progression on the right. Whether this relates to changing pneumonia or superimposed pulmonary edema is less than certain.      CTA PULMONARY W CONTRAST    Result Date: 6/9/2021  EXAMINATION: CTA OF THE CHEST 6/9/2021 5:42 pm TECHNIQUE: CTA of the chest was performed after the administration of intravenous contrast.  Multiplanar reformatted images are provided for review. MIP images are provided for review. Dose modulation, iterative reconstruction, and/or weight based adjustment of the mA/kV was utilized to reduce the radiation dose to as low as reasonably achievable. COMPARISON: April 19 spine HISTORY: ORDERING SYSTEM PROVIDED HISTORY: recent covid in april, hypoxic, r/o PE TECHNOLOGIST PROVIDED HISTORY: Reason for exam:->recent covid in april, hypoxic, r/o PE Decision Support Exception - unselect if not a suspected or confirmed emergency medical condition->Emergency Medical Condition (MA) FINDINGS: Pulmonary Arteries: Pulmonary arteries are adequately opacified for evaluation. No evidence of intraluminal filling defect to suggest pulmonary embolism. Main pulmonary artery is normal in caliber. Mediastinum: Nonspecific hilar and mediastinal nodes, which may be reactive. Cardiomegaly. No pericardial effusion. Lungs/pleura: Multifocal ground-glass and interstitial airspace disease in the lungs bilaterally, less extensive than on the prior examination and most consistent with multifocal pneumonia. Small bilateral pleural effusions, slightly larger on the right. . Images degraded by respiratory motion artifact. Upper Abdomen: Limited images of the upper abdomen are unremarkable. Soft Tissues/Bones:  status post median sternotomy. No acute bony pathology. No evidence of pulmonary emboli. Multifocal ground-glass and interstitial airspace disease in the lungs bilaterally, less extensive than on the prior examination and most consistent with multifocal pneumonia. Small bilateral pleural effusions, slightly larger on the right. Nonspecific hilar and mediastinal nodes, which may be reactive. Cardiomegaly.        Assessment:    Active Problems:    Acute respiratory failure with hypoxia (HCC)  Resolved Problems:    * No resolved hospital problems. *      Plan:  1. Exacerbation of COPD -continue IV antibiotics, steroids, respiratory support. Respiratory panel negative. Procalcitonin less than 0.02.  Pulmonary feels has scarring/fibrosis and superimposed bronchiolitis. 2.  T2DM - Diabetic diet. Glucose POCT. SS insulin scale. Check HgbA1C if not done within the last 3 months. But 2021, hemoglobin A1c 6.5%. 3.  CHF -continue meds. 2D echo if none done within the last 6 months to 1 year. 4.  History of COVID-19 in April 2021 -supportive care. 5.  Candida dermatitis -miconazole ointment and powder. NOTE: This report was transcribed using voice recognition software. Every effort was made to ensure accuracy; however, inadvertent computerized transcription errors may be present.     Electronically signed by Piter Sanchez MD on 6/10/2021 at 2:36 PM

## 2021-06-10 NOTE — PROGRESS NOTES
Patient uses walker and cane at home. She also uses a Trilogy at night with O2 bleed in. She lives alone, but does have a a Private Duty aide by the name of Jennifer Mars 5.5 hours daily.     Electronically signed by Chivo Thurston RN on 6/10/2021 at 9:11 AM

## 2021-06-10 NOTE — RT PROTOCOL NOTE
other Inpatient aerosolized bronchodilator medication orders and enter DuoNeb Nebulizer order with QID frequency and an Albuterol Nebulizer order with frequency of every 2 hours PRN wheezing or increased work of breathing using Per Protocol order mode. Repeat RT Therapy Protocol Assessment with second treatment then QID and as needed. Greater than 13 - discontinue any other Inpatient aerosolized bronchodilator medication orders and enter a DuoNeb Nebulizer order with every 4 hours frequency and an Albuterol Nebulizer order with frequency of every 2 hours PRN wheezing or increased work of breathing using Per Protocol order mode. Repeat RT Therapy Protocol Assessment with second treatment then every 4 hours and as needed. RT to enter RT Home Evaluation for COPD & MDI Assessment order using Per Protocol order mode.     Electronically signed by Char Frankel, RCP on 5/35/3778 at 1:01 PM

## 2021-06-10 NOTE — PROGRESS NOTES
Patient unable to accurately provide medication and dosage information. States she was seen last month by Dr. Can Mora. Call placed to their office requesting fax of most recent med reconciliation.     Electronically signed by Jacques Darling RN on 6/10/2021 at 9:12 AM

## 2021-06-10 NOTE — PROGRESS NOTES
An order for Zofran has been discontinued for this patient due to the risk of QT prolongation. If an antiemetic is indicated for your patient, please consider use of Tigan or Compazine. Current QTc = 517   Please contact the Pharmacy with any questions. Thank Zayda Washington Pharm. D 6/10/2021 7:41 AM

## 2021-06-10 NOTE — PROGRESS NOTES
Darren Baca was ordered Zocor which is a nonformulary medication. The patient has indicated that the home supply of this medication will be brought in to the hospital for inpatient use. If the medication has not been administered by 1400 on the following day from the time the order was placed, a pharmacist will follow-up with the nurse of the patient to assess the capability of the patient to bring in the medication. If it is determined that the patient cannot supply the medication and it is not available to be dispensed from the pharmacy, a call will be placed to the ordering provider to discuss alternative options. Patient has an allergy listed to Lipitor, which is our formulary product. We could try Crestor or Pravachol if she is not able to bring it in. Thank Kelly Weeks Pharm. D 6/10/2021 7:54 AM

## 2021-06-10 NOTE — CARE COORDINATION
CM NOTE: Per QFR--- admitted with resp failure & hypoxia. Hx covid in April. Hx CVA. Lives at home & has aides that asst 5 hrs per day. Daughter lives in [facility]. CM attempted to speak with pt but NA in room. CM spoke with daughter Isai Pinto) who is POA to discuss role, anticipated LOS & current plan of care. Reports pt lives alone but has friends who spend the night with her so she is not alone. States she has aides 5hrs a day through insurance. Lives alone in 1 story home. Is able to use steps occasionally. Has O2 at home  & uses 5L cont. Has NIV/CPAP both provided by [facility]. IDDM--has glucometer & testing supplies & tests her BGL as directed. Hx DANIS @ [facility]. Hx home care & therapy but unable to recall name of agency. Will order therapy to assist with transition of care. Pt agreeable to DANIS or C whichever her PCP orders. Will continue to follow pending progress.

## 2021-06-10 NOTE — PROGRESS NOTES
New consult sent to [facility] Cardiology via [facility] message.

## 2021-06-11 LAB
ANION GAP SERPL CALCULATED.3IONS-SCNC: 5 MMOL/L (ref 7–16)
ANION GAP SERPL CALCULATED.3IONS-SCNC: 6 MMOL/L (ref 7–16)
BASOPHILS ABSOLUTE: 0 E9/L (ref 0–0.2)
BASOPHILS RELATIVE PERCENT: 0 % (ref 0–2)
BUN BLDV-MCNC: 23 MG/DL (ref 6–23)
BUN BLDV-MCNC: 23 MG/DL (ref 6–23)
CALCIUM SERPL-MCNC: 8.7 MG/DL (ref 8.6–10.2)
CALCIUM SERPL-MCNC: 9 MG/DL (ref 8.6–10.2)
CHLORIDE BLD-SCNC: 101 MMOL/L (ref 98–107)
CHLORIDE BLD-SCNC: 99 MMOL/L (ref 98–107)
CO2: 33 MMOL/L (ref 22–29)
CO2: 34 MMOL/L (ref 22–29)
CREAT SERPL-MCNC: 1 MG/DL (ref 0.5–1)
CREAT SERPL-MCNC: 1.1 MG/DL (ref 0.5–1)
EOSINOPHILS ABSOLUTE: 0 E9/L (ref 0.05–0.5)
EOSINOPHILS RELATIVE PERCENT: 0 % (ref 0–6)
GFR AFRICAN AMERICAN: >60
GFR AFRICAN AMERICAN: >60
GFR NON-AFRICAN AMERICAN: >60 ML/MIN/1.73
GFR NON-AFRICAN AMERICAN: >60 ML/MIN/1.73
GLUCOSE BLD-MCNC: 186 MG/DL (ref 74–99)
GLUCOSE BLD-MCNC: 192 MG/DL (ref 74–99)
HBA1C MFR BLD: 6.8 % (ref 4–5.6)
HCT VFR BLD CALC: 35.8 % (ref 34–48)
HEMOGLOBIN: 10.7 G/DL (ref 11.5–15.5)
L. PNEUMOPHILA SEROGP 1 UR AG: NORMAL
LV EF: 58 %
LVEF MODALITY: NORMAL
LYMPHOCYTES ABSOLUTE: 0.55 E9/L (ref 1.5–4)
LYMPHOCYTES RELATIVE PERCENT: 6 % (ref 20–42)
MCH RBC QN AUTO: 30.6 PG (ref 26–35)
MCHC RBC AUTO-ENTMCNC: 29.9 % (ref 32–34.5)
MCV RBC AUTO: 102.3 FL (ref 80–99.9)
METER GLUCOSE: 184 MG/DL (ref 74–99)
METER GLUCOSE: 200 MG/DL (ref 74–99)
METER GLUCOSE: 210 MG/DL (ref 74–99)
METER GLUCOSE: 250 MG/DL (ref 74–99)
MONOCYTES ABSOLUTE: 0.36 E9/L (ref 0.1–0.95)
MONOCYTES RELATIVE PERCENT: 4 % (ref 2–12)
NEUTROPHILS ABSOLUTE: 8.19 E9/L (ref 1.8–7.3)
NEUTROPHILS RELATIVE PERCENT: 90 % (ref 43–80)
PDW BLD-RTO: 14.3 FL (ref 11.5–15)
PLATELET # BLD: 281 E9/L (ref 130–450)
PMV BLD AUTO: 10.8 FL (ref 7–12)
POTASSIUM REFLEX MAGNESIUM: 6 MMOL/L (ref 3.5–5)
POTASSIUM SERPL-SCNC: 5.8 MMOL/L (ref 3.5–5)
RBC # BLD: 3.5 E12/L (ref 3.5–5.5)
RBC # BLD: NORMAL 10*6/UL
SODIUM BLD-SCNC: 138 MMOL/L (ref 132–146)
SODIUM BLD-SCNC: 140 MMOL/L (ref 132–146)
STREP PNEUMONIAE ANTIGEN, URINE: NORMAL
TROPONIN, HIGH SENSITIVITY: 11 NG/L (ref 0–9)
TROPONIN, HIGH SENSITIVITY: 7 NG/L (ref 0–9)
TROPONIN, HIGH SENSITIVITY: 8 NG/L (ref 0–9)
WBC # BLD: 9.1 E9/L (ref 4.5–11.5)

## 2021-06-11 PROCEDURE — 97535 SELF CARE MNGMENT TRAINING: CPT

## 2021-06-11 PROCEDURE — 99223 1ST HOSP IP/OBS HIGH 75: CPT | Performed by: INTERNAL MEDICINE

## 2021-06-11 PROCEDURE — 6370000000 HC RX 637 (ALT 250 FOR IP): Performed by: FAMILY MEDICINE

## 2021-06-11 PROCEDURE — 94660 CPAP INITIATION&MGMT: CPT

## 2021-06-11 PROCEDURE — 82962 GLUCOSE BLOOD TEST: CPT

## 2021-06-11 PROCEDURE — 6370000000 HC RX 637 (ALT 250 FOR IP): Performed by: NURSE PRACTITIONER

## 2021-06-11 PROCEDURE — 93306 TTE W/DOPPLER COMPLETE: CPT

## 2021-06-11 PROCEDURE — 6360000004 HC RX CONTRAST MEDICATION: Performed by: FAMILY MEDICINE

## 2021-06-11 PROCEDURE — 85025 COMPLETE CBC W/AUTO DIFF WBC: CPT

## 2021-06-11 PROCEDURE — 84484 ASSAY OF TROPONIN QUANT: CPT

## 2021-06-11 PROCEDURE — 94640 AIRWAY INHALATION TREATMENT: CPT

## 2021-06-11 PROCEDURE — 87070 CULTURE OTHR SPECIMN AEROBIC: CPT

## 2021-06-11 PROCEDURE — 36415 COLL VENOUS BLD VENIPUNCTURE: CPT

## 2021-06-11 PROCEDURE — 1200000000 HC SEMI PRIVATE

## 2021-06-11 PROCEDURE — APPSS60 APP SPLIT SHARED TIME 46-60 MINUTES: Performed by: NURSE PRACTITIONER

## 2021-06-11 PROCEDURE — 2500000003 HC RX 250 WO HCPCS: Performed by: FAMILY MEDICINE

## 2021-06-11 PROCEDURE — 97165 OT EVAL LOW COMPLEX 30 MIN: CPT

## 2021-06-11 PROCEDURE — 2580000003 HC RX 258: Performed by: FAMILY MEDICINE

## 2021-06-11 PROCEDURE — 80048 BASIC METABOLIC PNL TOTAL CA: CPT

## 2021-06-11 PROCEDURE — 6360000002 HC RX W HCPCS: Performed by: CLINICAL NURSE SPECIALIST

## 2021-06-11 PROCEDURE — 97161 PT EVAL LOW COMPLEX 20 MIN: CPT

## 2021-06-11 PROCEDURE — 2700000000 HC OXYGEN THERAPY PER DAY

## 2021-06-11 PROCEDURE — 99233 SBSQ HOSP IP/OBS HIGH 50: CPT | Performed by: FAMILY MEDICINE

## 2021-06-11 PROCEDURE — 83036 HEMOGLOBIN GLYCOSYLATED A1C: CPT

## 2021-06-11 PROCEDURE — 6360000002 HC RX W HCPCS: Performed by: FAMILY MEDICINE

## 2021-06-11 PROCEDURE — 87206 SMEAR FLUORESCENT/ACID STAI: CPT

## 2021-06-11 RX ORDER — PREDNISONE 20 MG/1
20 TABLET ORAL DAILY
Status: DISCONTINUED | OUTPATIENT
Start: 2021-06-18 | End: 2021-06-16 | Stop reason: HOSPADM

## 2021-06-11 RX ORDER — PREDNISONE 1 MG/1
10 TABLET ORAL DAILY
Status: DISCONTINUED | OUTPATIENT
Start: 2021-06-21 | End: 2021-06-16 | Stop reason: HOSPADM

## 2021-06-11 RX ORDER — BUDESONIDE 0.5 MG/2ML
0.5 INHALANT ORAL 2 TIMES DAILY
Status: DISCONTINUED | OUTPATIENT
Start: 2021-06-11 | End: 2021-06-16 | Stop reason: HOSPADM

## 2021-06-11 RX ORDER — SODIUM POLYSTYRENE SULFONATE 15 G/60ML
15 SUSPENSION ORAL; RECTAL ONCE
Status: COMPLETED | OUTPATIENT
Start: 2021-06-11 | End: 2021-06-11

## 2021-06-11 RX ORDER — ARFORMOTEROL TARTRATE 15 UG/2ML
15 SOLUTION RESPIRATORY (INHALATION) 2 TIMES DAILY
Status: DISCONTINUED | OUTPATIENT
Start: 2021-06-11 | End: 2021-06-16 | Stop reason: HOSPADM

## 2021-06-11 RX ORDER — PREDNISONE 20 MG/1
40 TABLET ORAL DAILY
Status: COMPLETED | OUTPATIENT
Start: 2021-06-12 | End: 2021-06-14

## 2021-06-11 RX ORDER — BUMETANIDE 0.25 MG/ML
1 INJECTION, SOLUTION INTRAMUSCULAR; INTRAVENOUS DAILY
Status: DISCONTINUED | OUTPATIENT
Start: 2021-06-12 | End: 2021-06-13

## 2021-06-11 RX ADMIN — HYDRALAZINE HYDROCHLORIDE 50 MG: 50 TABLET ORAL at 09:59

## 2021-06-11 RX ADMIN — IPRATROPIUM BROMIDE AND ALBUTEROL SULFATE 1 AMPULE: .5; 3 SOLUTION RESPIRATORY (INHALATION) at 21:48

## 2021-06-11 RX ADMIN — METOCLOPRAMIDE 5 MG: 10 TABLET ORAL at 21:37

## 2021-06-11 RX ADMIN — BUDESONIDE 500 MCG: 0.5 SUSPENSION RESPIRATORY (INHALATION) at 21:48

## 2021-06-11 RX ADMIN — ACETAMINOPHEN 650 MG: 325 TABLET ORAL at 21:47

## 2021-06-11 RX ADMIN — HYDRALAZINE HYDROCHLORIDE 50 MG: 50 TABLET ORAL at 00:55

## 2021-06-11 RX ADMIN — INSULIN LISPRO 4 UNITS: 100 INJECTION, SOLUTION INTRAVENOUS; SUBCUTANEOUS at 16:20

## 2021-06-11 RX ADMIN — DULOXETINE HYDROCHLORIDE 60 MG: 60 CAPSULE, DELAYED RELEASE ORAL at 10:05

## 2021-06-11 RX ADMIN — TRAZODONE HYDROCHLORIDE 100 MG: 50 TABLET ORAL at 21:36

## 2021-06-11 RX ADMIN — PERFLUTREN 1.65 MG: 6.52 INJECTION, SUSPENSION INTRAVENOUS at 09:58

## 2021-06-11 RX ADMIN — ISOSORBIDE MONONITRATE 60 MG: 60 TABLET, EXTENDED RELEASE ORAL at 10:06

## 2021-06-11 RX ADMIN — CETIRIZINE HYDROCHLORIDE 10 MG: 10 TABLET, FILM COATED ORAL at 10:05

## 2021-06-11 RX ADMIN — METOCLOPRAMIDE 5 MG: 10 TABLET ORAL at 10:06

## 2021-06-11 RX ADMIN — IPRATROPIUM BROMIDE AND ALBUTEROL SULFATE 1 AMPULE: .5; 3 SOLUTION RESPIRATORY (INHALATION) at 09:01

## 2021-06-11 RX ADMIN — TROSPIUM CHLORIDE 20 MG: 20 TABLET, FILM COATED ORAL at 06:20

## 2021-06-11 RX ADMIN — TROSPIUM CHLORIDE 20 MG: 20 TABLET, FILM COATED ORAL at 16:20

## 2021-06-11 RX ADMIN — ACETAMINOPHEN 650 MG: 325 TABLET ORAL at 16:20

## 2021-06-11 RX ADMIN — INSULIN LISPRO 2 UNITS: 100 INJECTION, SOLUTION INTRAVENOUS; SUBCUTANEOUS at 11:36

## 2021-06-11 RX ADMIN — IPRATROPIUM BROMIDE AND ALBUTEROL SULFATE 1 AMPULE: .5; 3 SOLUTION RESPIRATORY (INHALATION) at 17:07

## 2021-06-11 RX ADMIN — MONTELUKAST SODIUM 10 MG: 10 TABLET, FILM COATED ORAL at 21:36

## 2021-06-11 RX ADMIN — Medication 10 ML: at 12:02

## 2021-06-11 RX ADMIN — SPIRONOLACTONE 25 MG: 25 TABLET ORAL at 10:06

## 2021-06-11 RX ADMIN — MICONAZOLE NITRATE: 2 POWDER TOPICAL at 22:44

## 2021-06-11 RX ADMIN — ACETAMINOPHEN 650 MG: 325 TABLET ORAL at 10:40

## 2021-06-11 RX ADMIN — Medication 10 ML: at 10:07

## 2021-06-11 RX ADMIN — METHYLPREDNISOLONE SODIUM SUCCINATE 40 MG: 40 INJECTION, POWDER, LYOPHILIZED, FOR SOLUTION INTRAMUSCULAR; INTRAVENOUS at 09:59

## 2021-06-11 RX ADMIN — CARVEDILOL 25 MG: 25 TABLET, FILM COATED ORAL at 09:59

## 2021-06-11 RX ADMIN — GABAPENTIN 100 MG: 100 CAPSULE ORAL at 21:36

## 2021-06-11 RX ADMIN — FLUTICASONE PROPIONATE 1 SPRAY: 50 SPRAY, METERED NASAL at 10:07

## 2021-06-11 RX ADMIN — NYSTATIN OINTMENT: 100000 OINTMENT TOPICAL at 10:07

## 2021-06-11 RX ADMIN — Medication 10 ML: at 21:17

## 2021-06-11 RX ADMIN — INSULIN LISPRO 3 UNITS: 100 INJECTION, SOLUTION INTRAVENOUS; SUBCUTANEOUS at 21:44

## 2021-06-11 RX ADMIN — HYDRALAZINE HYDROCHLORIDE 50 MG: 50 TABLET ORAL at 22:49

## 2021-06-11 RX ADMIN — NYSTATIN OINTMENT: 100000 OINTMENT TOPICAL at 21:36

## 2021-06-11 RX ADMIN — DULOXETINE HYDROCHLORIDE 60 MG: 60 CAPSULE, DELAYED RELEASE ORAL at 21:36

## 2021-06-11 RX ADMIN — METOCLOPRAMIDE 5 MG: 10 TABLET ORAL at 13:22

## 2021-06-11 RX ADMIN — DOXYCYCLINE HYCLATE 100 MG: 100 CAPSULE ORAL at 10:05

## 2021-06-11 RX ADMIN — MICONAZOLE NITRATE: 2 POWDER TOPICAL at 10:07

## 2021-06-11 RX ADMIN — GABAPENTIN 100 MG: 100 CAPSULE ORAL at 10:06

## 2021-06-11 RX ADMIN — GUAIFENESIN 400 MG: 400 TABLET, FILM COATED ORAL at 21:47

## 2021-06-11 RX ADMIN — SODIUM POLYSTYRENE SULFONATE 15 G: 15 SUSPENSION ORAL; RECTAL at 13:22

## 2021-06-11 RX ADMIN — INSULIN LISPRO 4 UNITS: 100 INJECTION, SOLUTION INTRAVENOUS; SUBCUTANEOUS at 06:39

## 2021-06-11 RX ADMIN — BACLOFEN 10 MG: 10 TABLET ORAL at 21:47

## 2021-06-11 RX ADMIN — BUMETANIDE 0.5 MG: 0.25 INJECTION INTRAMUSCULAR; INTRAVENOUS at 06:18

## 2021-06-11 RX ADMIN — ARFORMOTEROL TARTRATE 15 MCG: 15 SOLUTION RESPIRATORY (INHALATION) at 21:48

## 2021-06-11 RX ADMIN — HYDRALAZINE HYDROCHLORIDE 50 MG: 50 TABLET ORAL at 16:20

## 2021-06-11 RX ADMIN — CARVEDILOL 25 MG: 25 TABLET, FILM COATED ORAL at 16:20

## 2021-06-11 RX ADMIN — ENOXAPARIN SODIUM 40 MG: 40 INJECTION SUBCUTANEOUS at 10:06

## 2021-06-11 RX ADMIN — ASPIRIN 81 MG: 81 TABLET, CHEWABLE ORAL at 10:06

## 2021-06-11 RX ADMIN — SACUBITRIL AND VALSARTAN 1 TABLET: 24; 26 TABLET, FILM COATED ORAL at 10:05

## 2021-06-11 ASSESSMENT — PAIN DESCRIPTION - PAIN TYPE: TYPE: CHRONIC PAIN

## 2021-06-11 ASSESSMENT — PAIN DESCRIPTION - DESCRIPTORS: DESCRIPTORS: ACHING;DISCOMFORT;SORE

## 2021-06-11 ASSESSMENT — PAIN DESCRIPTION - ONSET: ONSET: ON-GOING

## 2021-06-11 ASSESSMENT — PAIN DESCRIPTION - PROGRESSION: CLINICAL_PROGRESSION: NOT CHANGED

## 2021-06-11 ASSESSMENT — PAIN - FUNCTIONAL ASSESSMENT: PAIN_FUNCTIONAL_ASSESSMENT: PREVENTS OR INTERFERES SOME ACTIVE ACTIVITIES AND ADLS

## 2021-06-11 ASSESSMENT — PAIN SCALES - GENERAL
PAINLEVEL_OUTOF10: 0
PAINLEVEL_OUTOF10: 4
PAINLEVEL_OUTOF10: 4
PAINLEVEL_OUTOF10: 5
PAINLEVEL_OUTOF10: 0

## 2021-06-11 ASSESSMENT — PAIN DESCRIPTION - ORIENTATION: ORIENTATION: RIGHT;LEFT;LOWER;MID

## 2021-06-11 ASSESSMENT — PAIN DESCRIPTION - FREQUENCY: FREQUENCY: INTERMITTENT

## 2021-06-11 ASSESSMENT — PAIN DESCRIPTION - LOCATION: LOCATION: BACK;GENERALIZED

## 2021-06-11 NOTE — CONSULTS
Inpatient Cardiology Consultation      Reason for Consult: Acute on chronic HFrEF    Consulting Physician: Dr. Floyd Ponce    Requesting Physician: Dr. Avani Robertson    Date of Consultation: 6/11/2021    HISTORY OF PRESENT ILLNESS:   Ms. Elizabeth Snow is a 70-year-old morbidly obese -American female who is known to Dr. Floyd Ponce; most recently seen in outpatient 5/20/2021 --> continued on guideline directed medical therapy and smoking cessation. PMH: CAD with history of CABG in 2011, no progression of disease on cardiac catheterization 2013 and with a Lexiscan MPS 6/5/2018 showing perfusion defect involving the lateral wall, without evidence of reversibility, diffusely hypokinetic left ventricular wall motion with a focal area of akinesis involving the mid lateral wall; severe LV dysfunction (LVEF 25% on TTE 2013) now with an improved LVEF of 65% on TTE (6/5/2018). , chronic right bundle branch block, type 2 diabetes mellitus, hypertension, hyperlipidemia, morbid obesity, current smoker, marijuana use, DAYAN and severe peripheral vascular disease --status post left popliteal and tibial trunk atherectomy on 11/13/2018 (initiated on Plavix and aspirin). She was hospitalized with Covid-19 pneumonia and for/2021 and was treated with steroids and antibiotics. Patient is compliant with her home medications. [facility]-ED on 6/9/2021 with complaints of a cough and shortness of breath. Patient was seen by her pulmonologist 6/9/2021 and was noted to be hypoxic 80% on room air and instructed to go to the ER for further evaluation and management. Patient was previously hospitalized for Covid-19 in 4/2021 with hypoxia and during admission was eventually weaned off prior to discharge. Since then she continues to have shortness of breath with increased lower extremity edema. Upon arrival to the ED: /93, heart rate 89, afebrile, 85% on room air.   Labs: Sodium 140, potassium 4.8, BUN 15, creatinine 0.9, lactic acid 0.6, procalcitonin: <0.02, proBNP 1889, hs-cTnT 11, 9 7, 8. Hemoglobin A1c 6.8. WBC 9.4, H/H stable, platelet count 690. Respiratory panel: Negative. Legionella and strep pneumoniae: Presumed if negative. Chest x-ray: Airspace disease with interval improvement on the left and probable slight progression on the right. Whether this relates to changing pneumonia or superimposed pulmonary edema is less uncertain. CT pulmonary: No evidence of pulmonary emboli, multifocal groundglass and interstitial airspace disease in the lungs bilaterally, less extensive than prior examination and most consistent with multifocal pneumonia, small bilateral pleural effusions, slightly larger on the right, nonspecific L urine mediastinal nodes which may be reactive, cardiomegaly. EKG: Sinus rhythm, right bundle branch block, rate 84 bpm.  Patient was admitted to a telemetry monitored unit. Pulmonary was consulted for hypoxia and pneumonia. She was started on Solu-Medrol, and IV antibiotics. Echocardiogram ordered 6/10/2021; results pending. Cardiology was consulted for CHF and she was started on 0.5 mg IV daily of Bumex per primary. Please note: past medical records were reviewed per electronic medical record (EMR) - see detailed reports under Past Medical/ Surgical History. Past Medical History:    1. Morbid obesity  2. Hypertension  3. Hyperlipidemia. Currently not on a statin but has been intolerant of Lipitor  4. Current tobacco abuse: 5-6 cigarettes/day. No plans to quit. 5. Diabetes which is insulin requiring , diabetic neuropathy   6. Obstructive sleep apnea  7. Ischemic cardiomyopathy with a CABG in 2011 with a LIMA to the LAD and a saphenous vein graft to the marginal last cardiac catheterization was in April 2015 and the LIMA was unable to be visualized and the saphenous vein graft to the obtuse marginal was patent. He had no further stress testing since that time.   8. 2-D echocardiogram January 4, 2017, EF DOSE) 81 MG chewable tablet TAKE 1 TABLET BY MOUTH EVERY DAY 1/5/21  Yes Karen Hayes DO   DULoxetine (CYMBALTA) 60 MG extended release capsule TAKE ONE (1) CAPSULE BY MOUTH TWICE DAILY 6/2/21   Jose Angel Hayes DO   montelukast (SINGULAIR) 10 MG tablet TAKE (1) TABLET BY MOUTH NIGHTLY 6/1/21   Jose Angel Hayes DO   loratadine (CLARITIN) 10 MG tablet Take 10 mg by mouth daily    Historical Provider, MD   fluticasone (FLONASE) 50 MCG/ACT nasal spray USE 1 SPRAY IN EACH NOSTRIL TWICE A DAY 5/11/21   Jose Angel Hayes DO   MYRBETRIQ 50 MG TB24 TAKE 1 TABLET BY MOUTH EVERY DAY 5/10/21   Jose Angel Hayes DO   omeprazole (PRILOSEC) 40 MG delayed release capsule Take 1 capsule by mouth every morning (before breakfast) 5/5/21   Jose Angel Hayes DO   metoclopramide (REGLAN) 5 MG tablet Take 1 tablet by mouth 3 times daily 5/5/21   Jose Angel Hayes DO   gabapentin (NEURONTIN) 100 MG capsule Take 1 capsule by mouth 2 times daily for 180 days.  Intended supply: 30 days 5/4/21 10/31/21  Jose Angel Hayes DO   Blood Pressure Monitoring (B-D ASSURE BPM/AUTO WRIST CUFF) MISC Use daily 5/4/21   Jose Angel Hayes DO   Acetaminophen (TYLENOL) 325 MG CAPS Take 325 mg by mouth every 6 hours as needed (pain) 5/4/21   Jose Angel Hayes DO   insulin lispro, 1 Unit Dial, ([facility]) 100 UNIT/ML SOPN Use sliding scale as below TID with meals Sugar  0 units Sugar 131-180 2 units Sugar 181-240 4 units Sugar 241-300 6 units Sugar 301-350 8 units Sugar 351-400 10 units Sugar > 400 12 units Call above 500 4/29/21   Jose Angel Hayes DO   vitamin D3 (CHOLECALCIFEROL) 25 MCG (1000 UT) TABS tablet TAKE 1 TABLET BY MOUTH EVERY DAY 4/26/21   Jose Angel Hayes,    zinc 50 MG TABS tablet Take 1 tablet by mouth daily 4/26/21   Jose Angel Hayes DO   magnesium oxide (MAG-OX) 400 MG tablet Take 1 tablet by mouth daily 4/26/21   Jose Angel Hayes DO   carvedilol (COREG) 12.5 MG tablet Take 1 tablet by mouth 2 times daily (with meals)  Patient taking differently: Take 25 mg by mouth 2 times daily (with meals)  4/24/21   Olman Grover DO   Insulin Degludec (TRESIBA FLEXTOUCH) 200 UNIT/ML SOPN INJECT 50 UNITS INTO THE SKIN nightly 4/15/21   Omkar Hayes DO   cetirizine (ZYRTEC) 10 MG tablet TAKE 1 TABLET BY MOUTH EVERY DAY 4/13/21   Omkar Hayes DO   baclofen (LIORESAL) 10 MG tablet TAKE 1 TABLET BY MOUTH TWICE A DAY AS NEEDED  Patient taking differently: Take 10 mg by mouth 2 times daily as needed  4/6/21   Omkar Hayes DO   spironolactone (ALDACTONE) 25 MG tablet Take 1 tablet by mouth daily 3/16/21   Arnaldo Stevenson MD   famotidine (PEPCID) 40 MG tablet Take 40 mg by mouth 2 times daily  3/11/21   Historical Provider, MD   docusate sodium (COLACE) 100 MG capsule TAKE 2 CAPSULES BY MOUTH AT BEDTIME 3/11/21   Historical Provider, MD   hydrALAZINE (APRESOLINE) 50 MG tablet TAKE 1 TABLET BY MOUTH THREE TIMES A DAY 2/23/21   Omkar Hayes DO   Alcohol Swabs ([facility] ALCOHOL PREP SWABS) 70 % PADS Use daily 12/29/20   Omkar Hayes DO   blood glucose test strips (ACCU-CHEK GUIDE) strip 1 each by In Vitro route 3 times daily 12/29/20   Omkar Hayes DO   Accu-Chek FastClix Lancets MISC Use 4x daily 12/28/20   Omkar Hayes DO   SPIRIVA RESPIMAT 1.25 MCG/ACT AERS inhaler INHALE TWO (2) PUFFS BY MOUTH EVERY DAY 11/24/20   Omkar Hayes DO   isosorbide mononitrate (IMDUR) 60 MG extended release tablet TAKE (1) TABLET BY MOUTH DAILY 11/18/20   Arnaldo Stevenson MD   bumetanide (BUMEX) 1 MG tablet TAKE (1) TABLET BY MOUTH DAILY 10/13/20   Omkar Hayes DO   traZODone (DESYREL) 100 MG tablet TAKE (1) TABLET BY MOUTH NIGHTLY 10/13/20   Omkar Hayes DO   simvastatin (ZOCOR) 20 MG tablet TAKE 1 TABLET BY MOUTH NIGHTLY 10/6/20   Arnaldo Stevenson MD   azelastine (ASTELIN) 0.1 % nasal spray 1 spray by Nasal route 2 times daily Use in each nostril as directed 9/16/20   Darrel Leos DO Units, 0-12 Units, Subcutaneous, TID WC  insulin lispro (HUMALOG) injection vial 0-6 Units, 0-6 Units, Subcutaneous, Nightly  glucose (GLUTOSE) 40 % oral gel 15 g, 15 g, Oral, PRN  dextrose 50 % IV solution, 12.5 g, Intravenous, PRN  glucagon (rDNA) injection 1 mg, 1 mg, Intramuscular, PRN  dextrose 5 % solution, 100 mL/hr, Intravenous, PRN  isosorbide mononitrate (IMDUR) extended release tablet 60 mg, 60 mg, Oral, Daily  metoclopramide (REGLAN) tablet 5 mg, 5 mg, Oral, TID  trospium (SANCTURA) tablet 20 mg, 20 mg, Oral, BID AC  montelukast (SINGULAIR) tablet 10 mg, 10 mg, Oral, Nightly  spironolactone (ALDACTONE) tablet 25 mg, 25 mg, Oral, Daily  traZODone (DESYREL) tablet 100 mg, 100 mg, Oral, Nightly  bumetanide (BUMEX) injection 0.5 mg, 0.5 mg, Intravenous, Daily  simvastatin (ZOCOR) tablet 20 mg, 20 mg, Oral, Nightly  albuterol (PROVENTIL) nebulizer solution 2.5 mg, 2.5 mg, Nebulization, Q2H PRN  nystatin (MYCOSTATIN) ointment, , Topical, BID  miconazole (MICOTIN) 2 % powder, , Topical, BID  ipratropium-albuterol (DUONEB) nebulizer solution 1 ampule, 1 ampule, Inhalation, Q4H WA    Allergies:  Latex, Bee venom, Dilaudid [hydromorphone hcl], Dye [iodides], Percocet [oxycodone-acetaminophen], Keflex [cephalexin], Lasix [furosemide], Levaquin [levofloxacin in d5w], Lipitor, Lyrica [pregabalin], Morphine, Naproxen, Nefazodone, Norvasc [amlodipine], Oxycodone-acetaminophen, Shellfish-derived products, and Trazodone and nefazodone    Social History:    Current smoker: 5-6 cigarettes/day x 40 years --> quit 1.5 weeks ago. Uses marijuana twice per day  Denies alcohol use  Denies cocaine and heroin use. Family History: Denies family history significant for premature CAD. REVIEW OF SYSTEMS:     · Constitutional: + Fatigue. Denies fevers, + chills. Denies night sweats  · Eyes: Denies visual changes or drainage  · ENT: Denies headaches or hearing loss. No mouth sores or sore throat.  No epistaxis   · Cardiovascular: + Please see HPI + pedal lower extremity swelling. · Respiratory: + SAUNDERS, + cough, orthopnea or PND. No hemoptysis   · Gastrointestinal: Denies hematemesis or anorexia. No hematochezia or melena    · Genitourinary: Denies urgency, dysuria or hematuria. · Musculoskeletal: Denies gait disturbance, weakness or joint complaints  · Integumentary: Denies rash, hives or pruritis   · Neurological: Denies dizziness, headaches or seizures. No numbness or tingling  · Psychiatric: Denies anxiety or depression. · Endocrine: Denies temperature intolerance. +8 pound weight gain x1 week. · Hematologic/Lymphatic: Denies abnormal bruising or bleeding. No swollen lymph nodes    PHYSICAL EXAM:   /68   Pulse 84   Temp 98.7 °F (37.1 °C) (Oral)   Resp 18   Ht 5' 2\" (1.575 m)   Wt 233 lb 6 oz (105.9 kg)   LMP 01/01/1990   SpO2 90% Comment: took off pap  BMI 42.68 kg/m²   CONST:  Well developed, well nourished middle age morbidly obese AA female who appears of stated age. Awake, alert and cooperative. No apparent distress. HEENT:   Head- Normocephalic, atraumatic   Eyes- Conjunctivae pink, anicteric  Throat- Oral mucosa pink and moist  Neck-unable to assess to the patient has a short thick neck. CHEST: Chest symmetrical and non-tender to palpation. No accessory muscle use or intercostal retractions  RESPIRATORY: Lung sounds -expiratory wheeze with faint bibasilar rales. + On supplemental oxygen. CARDIOVASCULAR:     Heart Inspection- shows no noted pulsations  Heart Palpation- no heaves or thrills; PMI is non-displaced   Heart Ausculation- Regular rate and rhythm, no murmur. No s3, s4 or rub   PV: Right 1+ pitting pedal edema, trace left pedal edema. No varicosities. Pedal pulses palpable, no clubbing or cyanosis   ABDOMEN: Soft, obese, non-tender to light palpation. Bowel sounds present. No palpable masses no organomegaly; no abdominal bruit  MS: Good muscle strength and tone. No atrophy or abnormal movements.    : Deferred  SKIN: Warm and dry no statis dermatitis or ulcers   NEURO / PSYCH: Oriented to person, place and time. Speech clear and appropriate. Follows all commands. Pleasant affect     DATA:    ECG: See HPI. Tele strips: Sinus rhythm. Diagnostic:      Intake/Output Summary (Last 24 hours) at 6/11/2021 1138  Last data filed at 6/11/2021 1032  Gross per 24 hour   Intake 0 ml   Output 2250 ml   Net -2250 ml       Labs:   CBC:   Recent Labs     06/09/21  1529 06/11/21  0545   WBC 9.4 9.1   HGB 12.2 10.7*   HCT 41.3 35.8    281     BMP:   Recent Labs     06/09/21  1529 06/11/21  0545    140   K 4.8 6.0*   CO2 32* 33*   BUN 15 23   CREATININE 0.9 1.1*   LABGLOM >60 >60   CALCIUM 9.2 8.7     Mag: No results for input(s): MG in the last 72 hours. Phos: No results for input(s): PHOS in the last 72 hours. TFT:   Lab Results   Component Value Date    TSH 0.646 02/05/2021    C2JIQAG 7.9 11/07/2017    T4FREE 1.34 12/01/2016      HgA1c:   Lab Results   Component Value Date    LABA1C 6.8 (H) 06/11/2021     No results found for: EAG  proBNP:   Recent Labs     06/09/21  1529   PROBNP 1,889*     PT/INR: No results for input(s): PROTIME, INR in the last 72 hours. APTT:No results for input(s): APTT in the last 72 hours.   CARDIAC ENZYMES:  Recent Labs     06/09/21  1529 06/10/21  1732 06/10/21  2340 06/11/21  0545   TROPHS 11* 9 7 8     FASTING LIPID PANEL:  Lab Results   Component Value Date    CHOL 131 02/05/2021    HDL 27 02/05/2021    LDLCALC 60 02/05/2021    TRIG 219 02/05/2021     LIVER PROFILE:  Recent Labs     06/09/21  1529   AST 11   ALT 17   LABALBU 3.6     CTA pulmonary left contrast: 6/9/2021  No evidence of pulmonary emboli.       Multifocal ground-glass and interstitial airspace disease in the lungs   bilaterally, less extensive than on the prior examination and most consistent   with multifocal pneumonia.       Small bilateral pleural effusions, slightly larger on the right.       Nonspecific hilar and mediastinal nodes, which may be reactive.       Cardiomegaly.         Chest x-ray: 6/9/2021:  Airspace disease with interval improvement on the left and probable slight   progression on the right. Whether this relates to changing pneumonia or   superimposed pulmonary edema is less than certain.         Assessment:  · Acute on chronic HF with improved EF.  LVEF 25% (2013) --> 65% on TTE 6/2018. · ACC stage C; NYHA class III  · Mildly hypervolemic   · Hypertensive: poorly controlled  · Hyperlipidemia with history of intolerance to statins  · Chronic tobacco use; recently quit smoking 1.5 weeks ago. · + Daily marijuana use  · Chronic pain syndrome  · Morbid obesity with obstructive sleep apnea on trilogy  · Type 2 diabetes on insulin with a diabetic peripheral neuropathy  · CAD, s/p CABG x2 with LIMA to LAD, SVG to OM1 underwent in 2011. Nonischemic Lexiscan 6/5/2018. · Chronic right bundle branch block   · History of bronchial asthma  · Chronic constipation  · Hyperkalemia        Plan:   · Check 2D echocardiogram  · Repeat potassium 5.8--> will give Kayexalate. Hold Entresto and Aldactone for now  · Increase IV diuretic to Bumex 1 mg IV daily  · Monitor renal function, daily weights and strict I/O's. · Continue rest of current cardiac medications. · Low salt and low potassium diet  · She was strongly encouraged to quit tobacco and marijuana use  · Further recommendations pending the above clinical course      Above assessment and plan as per Dr. Roberto Torres. .  Electronically signed by PATRIZIA Desouza CNP on 6/11/21 at 1:25 PM EDT

## 2021-06-11 NOTE — PROGRESS NOTES
Physical Therapy    Facility/Department: [facility] MED SURG  Initial Assessment    NAME: Gibran Ritter  : 1955  MRN: [id number]    Date of Service: 2021      Patient Diagnosis(es): The primary encounter diagnosis was Acute respiratory failure with hypoxia ([address] A diagnosis of Pneumonia of both lungs due to infectious organism, unspecified part of lung was also pertinent to this visit. has a past medical history of Asthma, Atherosclerosis of native artery of left leg with rest pain ([address] C. difficile diarrhea, CAD (coronary artery disease), Cellulitis and abscess of trunk, Cerebellar infarct (HCC), Chronic back pain, Chronic kidney disease, Chronic systolic congestive heart failure ([address] Chronic venous insufficiency, COPD (chronic obstructive pulmonary disease) ([address] Depression, Diabetes mellitus ([address] Diabetic neuropathy ([address] Diverticulosis, Fatty liver, Glaucoma, open angle, Hepatic encephalopathy ([address] Hiatal hernia, History of blood transfusion, Hyperlipidemia, Hyperplastic colon polyp, Hypertension, Incontinence, Liver mass, Morbid obesity ([address] Movement disorder, Myocardial infarction ([address] Osteoarthritis, generalized, Pain in both lower legs, Peripheral vascular disease ([address] Pneumonia, Pulmonary edema, PVD (peripheral vascular disease) with claudication ([address] Sleep apnea, Status post peripheral artery angioplasty, Tobacco abuse, Tobacco abuse, Tubular adenoma polyp of rectum, Urinary tract infection due to ESBL Klebsiella, and Ventral hernia. has a past surgical history that includes knee surgery; Upper gastrointestinal endoscopy (12); Coronary artery bypass graft; layer wound closure (Left, [id number]); Echo Complete (10/9/2013); polysomnography (2016); liver biopsy (2016); bronchial brush biopsy (2013); Breast surgery (); Cholecystectomy (YRS AGO); Cardiac surgery (2011); Cardiac catheterization (2015); Hysterectomy; joint replacement ();  Upper gastrointestinal endoscopy (12/23/2016); Colonoscopy (11/15/2013); Colonoscopy (12/23/2016); Upper gastrointestinal endoscopy (02/08/2017); Endoscopy, colon, diagnostic (04/18/2017); Gallbladder surgery; and angioplasty (11/13/2018). Evaluating Therapist: [facility] PT      Room #:  0602/0602-A  Diagnosis:  Acute respiratory failure with hypoxia ([facility] [address] [J96.01]  PMHx/PSHx:  CVA, COPD, CHF, PVD  Precautions:  Falls, O2    Social:  Pt lives alone but has friends who stay with her at night. Has caregivers 5 hours a day. 1 floor, 2 steps to enter  Prior to admission ambulates with ww, has assistance with ADL's     Initial Evaluation  Date: 6/11/21 Treatment      Short Term/ Long Term   Goals   Was pt agreeable to Eval/treatment? yes     Does pt have pain? no     Bed Mobility  Rolling: NT  Supine to sit: NT  Sit to supine: NT  Scooting: NT  independent   Transfers Sit to stand: supervision  Stand to sit: supervision  Stand pivot: supervision  independent   Ambulation    75 feet with ww with supervision  100 feet with ww independent   Stair Negotiation  Ascended and descended  NT   2 steps with 1 rail independent   LE strength     4-/5    4/5   balance      Good with ww     AM-PAC Raw score               18/24         Pt is alert and Oriented   LE ROM: WFL  Sensation: intact  Endurance: fair     ASSESSMENT:    Pt displays functional ability as noted in the objective portion of this evaluation. Patient education  Pt educated on PT objectives    Patient response to education:   Pt verbalized understanding Pt demonstrated skill Pt requires further education in this area   yes           Comments:  SpO2 on 4L after ambulation 98%    Pt's/ family goals   1.  To return home    Conditions Requiring Skilled Therapeutic Intervention:    [x]Decreased strength     []Decreased ROM  [x]Decreased functional mobility  [x]Decreased balance   [x]Decreased endurance   []Decreased posture  []Decreased sensation  []Decreased coordination   []Decreased vision  []Decreased safety awareness   []Increased pain       Patient and or family understand(s) diagnosis, prognosis, and plan of care. Prognosis is good for reaching above PT goals    PHYSICAL THERAPY PLAN OF CARE:    PT POC is established based on physician order and patient diagnosis     Referring provider/PT Order:  Jo Ann Braga MD / PT eval and treat  Diagnosis:  Acute respiratory failure with hypoxia ([facility] [address] [J96.01]      Current Treatment Recommendations:     [x] Strengthening to improve independence with functional mobility   [] ROM to improve independence with functional mobility   [x] Balance Training to improve static/dynamic balance and to reduce fall risk  [x] Endurance Training to improve activity tolerance during functional mobility   [x] Transfer Training to improve safety and independence with all functional transfers   [x] Gait Training to improve gait mechanics, endurance and assess need for appropriate assistive device  [x] Stair Training in preparation for safe discharge home and/or into the community   [] Positioning to prevent skin breakdown and contractures  [x] Safety and Education Training   [x] Patient/Caregiver Education   [] HEP  [] Other     PT long term treatment goals are located in above grid    Frequency of treatments: 2-5x/week x 5 days. Time in  955  Time out  1010        Evaluation Time includes thorough review of current medical information, gathering information on past medical history/social history and prior level of function, completion of standardized testing/informal observation of tasks, assessment of data and education on plan of care and goals.       CPT codes:  [x] Low Complexity PT evaluation 42206  [] Moderate Complexity PT evaluation 67894  [] High Complexity PT evaluation 11353  [] PT Re-evaluation 24860  [] Gait training 58109 minutes  [] Manual therapy 90736 minutes  [] Therapeutic activities 99808 minutes  [] Therapeutic exercises 86836 minutes  [] Neuromuscular reeducation 82616 minutes     Auther Do PT [id number]

## 2021-06-11 NOTE — PROGRESS NOTES
Pulmonary Progress Note    Admit Date: 6/9/2021                            PCP: Mariella Cordova DO  Active Problems:    Acute respiratory failure with hypoxia ([address]    Pneumonia of both lungs due to infectious organism  Resolved Problems:    * No resolved hospital problems. *      Subjective:  Up in bedside chair on 4LNC.  Feeling better  + cough for thick yellow mucous  wearing NIV at HS     Medications:   sodium chloride      dextrose          [START ON 6/12/2021] bumetanide  1 mg Intravenous Daily    aspirin  81 mg Oral Daily    carvedilol  25 mg Oral BID WC    cetirizine  10 mg Oral Daily    DULoxetine  60 mg Oral BID    [Held by provider] sacubitril-valsartan  1 tablet Oral BID    fluticasone  1 spray Each Nostril Daily    gabapentin  100 mg Oral BID    hydrALAZINE  50 mg Oral Q8H    sodium chloride flush  5-40 mL Intravenous 2 times per day    enoxaparin  40 mg Subcutaneous Daily    cefTRIAXone (ROCEPHIN) IV  1,000 mg Intravenous Q24H    And    doxycycline hyclate  100 mg Oral 2 times per day    methylPREDNISolone  40 mg Intravenous Q12H    insulin lispro  0-12 Units Subcutaneous TID WC    insulin lispro  0-6 Units Subcutaneous Nightly    isosorbide mononitrate  60 mg Oral Daily    metoclopramide  5 mg Oral TID    trospium  20 mg Oral BID AC    montelukast  10 mg Oral Nightly    [Held by provider] spironolactone  25 mg Oral Daily    traZODone  100 mg Oral Nightly    simvastatin  20 mg Oral Nightly    nystatin   Topical BID    miconazole   Topical BID    ipratropium-albuterol  1 ampule Inhalation Q4H WA       Vitals:  VITALS:  /68   Pulse 84   Temp 98.7 °F (37.1 °C) (Oral)   Resp 18   Ht 5' 2\" (1.575 m)   Wt 233 lb 6 oz (105.9 kg)   LMP 01/01/1990   SpO2 90% Comment: took off pap  BMI 42.68 kg/m²   24HR INTAKE/OUTPUT:      Intake/Output Summary (Last 24 hours) at 6/11/2021 1337  Last data filed at 6/11/2021 1325  Gross per 24 hour   Intake 150 ml   Output 1250 ml Net -1100 ml     CURRENT PULSE OXIMETRY:  SpO2: 90 % (took off pap)  24HR PULSE OXIMETRY RANGE:  SpO2  Av.8 %  Min: 90 %  Max: 98 %  CVP:    VENT SETTINGS:   Vent Information  Skin Assessment: Clean, dry, & intact  SpO2: 90 % (took off pap)  Mask Type: Full face mask  Mask Size: Small  Additional Respiratory  Assessments  Pulse: 84  Resp: 18  SpO2: 90 % (took off pap)      EXAM:  General: Alert, in NAD  ENT: No discharge. Pharynx clear. membranes moist   Neck: Supple, Trachea midline. Crowded airway   Resp: No accessory muscle use. Non-labored. Lungs coarse throughout   CV: Regular rate. Regular rhythm. No murmur . No edema. ABD: Non-tender. Non-distended. Soft, round . Normal bowel sounds. Skin: Warm and dry. M/S: No cyanosis. No joint deformity. No clubbing. Neuro: Awake. Follows commands. O x 3, PEDROZA  Psych:  calm and interactive     I/O: I/O last 3 completed shifts: In: 120 [P.O.:120]  Out: 1500 [Urine:1500]  I/O this shift:  In: 150 [P.O.:150]  Out: 750 [Urine:750]     Results:  CBC:   Recent Labs     21  1529 21  0545   WBC 9.4 9.1   HGB 12.2 10.7*   HCT 41.3 35.8   MCV 99.5 102.3*    281     BMP:   Recent Labs     21  1529 21  0545 21  1150    140 138   K 4.8 6.0* 5.8*    101 99   CO2 32* 33* 34*   BUN 15 23 23   CREATININE 0.9 1.1* 1.0     LFT:   Recent Labs     21  1529   ALKPHOS 74   ALT 17   AST 11   PROT 7.0   BILITOT 0.3   LABALBU 3.6     PT/INR: No results for input(s): PROTIME, INR in the last 72 hours. Procalcitonin:   Recent Labs     21  2100   PROCAL <0.02     Cultures:  Results for Livier Clark (MRN [id number]) as of 2021 13:38   Ref.  Range 2021 21:00   Influenza A by PCR Latest Ref Range: Not Detected  Not Detected   Influenza B by PCR Latest Ref Range: Not Detected  Not Detected   Adenovirus by PCR Latest Ref Range: Not Detected  Not Detected   Coronavirus 229E by PCR Latest Ref Range: Not Detected  Not Detected   Coronavirus HKU1 by PCR Latest Ref Range: Not Detected  Not Detected   Coronavirus NL63 by PCR Latest Ref Range: Not Detected  Not Detected   Coronavirus OC43 by PCR Latest Ref Range: Not Detected  Not Detected   Human Metapneumovirus by PCR Latest Ref Range: Not Detected  Not Detected   Human Rhinovirus/Enterovirus by PCR Latest Ref Range: Not Detected  Not Detected   Parainfluenza Virus 1 by PCR Latest Ref Range: Not Detected  Not Detected   Parainfluenza Virus 2 by PCR Latest Ref Range: Not Detected  Not Detected   Parainfluenza Virus 3 by PCR Latest Ref Range: Not Detected  Not Detected   Parainfluenza Virus 4 by PCR Latest Ref Range: Not Detected  Not Detected   Respiratory Syncytial Virus by PCR Latest Ref Range: Not Detected  Not Detected   Bordetella parapertussis by PCR Latest Ref Range: Not Detected  Not Detected   Chlamydophilia pneumoniae by PCR Latest Ref Range: Not Detected  Not Detected   Mycoplasma pneumoniae by PCR Latest Ref Range: Not Detected  Not Detected   SARS-CoV-2, PCR Latest Ref Range: Not Detected  Not Detected   Bordetella pertussis by PCR Latest Ref Range: Not Detected  Not Detected         ABG:   No results for input(s): PH, PO2, PCO2, HCO3, BE, O2SAT in the last 72 hours. Films:  XR CHEST PORTABLE    Result Date: 6/9/2021  EXAMINATION: ONE XRAY VIEW OF THE CHEST 6/9/2021 2:53 pm COMPARISON: 19 April 2021 HISTORY: ORDERING SYSTEM PROVIDED HISTORY: hypoxia TECHNOLOGIST PROVIDED HISTORY: Reason for exam:->hypoxia FINDINGS: Prior median sternotomy. Heart is enlarged. Pulmonary vascularity appears mildly congested. Airspace disease on the left appears diminished. Airspace disease on the right may be minimally progressive. Neither costophrenic angle is blunted. Airspace disease with interval improvement on the left and probable slight progression on the right. Whether this relates to changing pneumonia or superimposed pulmonary edema is less than certain.        CTA PULMONARY W CONTRAST    Result Date: 6/9/2021  EXAMINATION: CTA OF THE CHEST 6/9/2021 5:42 pm TECHNIQUE: CTA of the chest was performed after the administration of intravenous contrast.  Multiplanar reformatted images are provided for review. MIP images are provided for review. Dose modulation, iterative reconstruction, and/or weight based adjustment of the mA/kV was utilized to reduce the radiation dose to as low as reasonably achievable. COMPARISON: April 19 spine HISTORY: ORDERING SYSTEM PROVIDED HISTORY: recent covid in april, hypoxic, r/o PE TECHNOLOGIST PROVIDED HISTORY: Reason for exam:->recent covid in april, hypoxic, r/o PE Decision Support Exception - unselect if not a suspected or confirmed emergency medical condition->Emergency Medical Condition (MA) FINDINGS: Pulmonary Arteries: Pulmonary arteries are adequately opacified for evaluation. No evidence of intraluminal filling defect to suggest pulmonary embolism. Main pulmonary artery is normal in caliber. Mediastinum: Nonspecific hilar and mediastinal nodes, which may be reactive. Cardiomegaly. No pericardial effusion. Lungs/pleura: Multifocal ground-glass and interstitial airspace disease in the lungs bilaterally, less extensive than on the prior examination and most consistent with multifocal pneumonia. Small bilateral pleural effusions, slightly larger on the right. . Images degraded by respiratory motion artifact. Upper Abdomen: Limited images of the upper abdomen are unremarkable. Soft Tissues/Bones:  status post median sternotomy. No acute bony pathology. No evidence of pulmonary emboli. Multifocal ground-glass and interstitial airspace disease in the lungs bilaterally, less extensive than on the prior examination and most consistent with multifocal pneumonia. Small bilateral pleural effusions, slightly larger on the right. Nonspecific hilar and mediastinal nodes, which may be reactive. Cardiomegaly.        ASSESSMENT:  1.) Acute on Chronic

## 2021-06-11 NOTE — PROGRESS NOTES
[facility] ER Progress Note    Admitting Date and Time: 6/9/2021  2:35 PM  Admit Dx: Acute respiratory failure with hypoxia ([facility] [address] [J96.01]    Subjective:  Patient is being followed for Acute respiratory failure with hypoxia ([facility] [address] [J96.01]     Pt feels congested. Wants more fluid to drink. Explained why need to fluid restrict to reduce the congestion. She voiced understanding. Per RN: does not like lunch tray. Spoke with dietary and will order something else. ROS: denies fever, chills, cp, sob, n/v, HA unless stated above.       aspirin  81 mg Oral Daily    carvedilol  25 mg Oral BID WC    cetirizine  10 mg Oral Daily    DULoxetine  60 mg Oral BID    sacubitril-valsartan  1 tablet Oral BID    fluticasone  1 spray Each Nostril Daily    gabapentin  100 mg Oral BID    hydrALAZINE  50 mg Oral Q8H    sodium chloride flush  5-40 mL Intravenous 2 times per day    enoxaparin  40 mg Subcutaneous Daily    cefTRIAXone (ROCEPHIN) IV  1,000 mg Intravenous Q24H    And    doxycycline hyclate  100 mg Oral 2 times per day    methylPREDNISolone  40 mg Intravenous Q12H    insulin lispro  0-12 Units Subcutaneous TID WC    insulin lispro  0-6 Units Subcutaneous Nightly    isosorbide mononitrate  60 mg Oral Daily    metoclopramide  5 mg Oral TID    trospium  20 mg Oral BID AC    montelukast  10 mg Oral Nightly    spironolactone  25 mg Oral Daily    traZODone  100 mg Oral Nightly    bumetanide  0.5 mg Intravenous Daily    simvastatin  20 mg Oral Nightly    nystatin   Topical BID    miconazole   Topical BID    ipratropium-albuterol  1 ampule Inhalation Q4H WA     baclofen, 10 mg, BID PRN  sodium chloride flush, 5-40 mL, PRN  sodium chloride, 25 mL, PRN  polyethylene glycol, 17 g, Daily PRN  acetaminophen, 650 mg, Q6H PRN   Or  acetaminophen, 650 mg, Q6H PRN  guaiFENesin, 400 mg, 4x Daily PRN  glucose, 15 g, PRN  dextrose, 12.5 g, PRN  glucagon (rDNA), 1 mg, PRN  dextrose, 100 mL/hr, PRN  albuterol, 2.5 mg, Q2H PRN         Objective:    /68   Pulse 84   Temp 98.7 °F (37.1 °C) (Oral)   Resp 18   Ht 5' 2\" (1.575 m)   Wt 233 lb 6 oz (105.9 kg)   LMP 01/01/1990   SpO2 90% Comment: took off pap  BMI 42.68 kg/m²     General Appearance: alert and oriented to person, place and time and in no acute distress  Skin: warm and dry  Head: normocephalic and atraumatic  Eyes: pupils equal, round, and reactive to light, extraocular eye movements intact, conjunctivae normal  Neck: neck supple and non tender without mass   Pulmonary/Chest: clear to auscultation bilaterally- no wheezes, rales or rhonchi, normal air movement, no respiratory distress  Cardiovascular: normal rate, normal S1 and S2 and no carotid bruits  Abdomen: soft, non-tender, non-distended, normal bowel sounds, no masses or organomegaly  Extremities: no cyanosis, no clubbing and no edema  Neurologic: no cranial nerve deficit and speech normal        Recent Labs     06/09/21  1529 06/11/21  0545    140   K 4.8 6.0*    101   CO2 32* 33*   BUN 15 23   CREATININE 0.9 1.1*   GLUCOSE 115* 192*   CALCIUM 9.2 8.7       Recent Labs     06/09/21  1529 06/11/21  0545   WBC 9.4 9.1   RBC 4.15 3.50   HGB 12.2 10.7*   HCT 41.3 35.8   MCV 99.5 102.3*   MCH 29.4 30.6   MCHC 29.5* 29.9*   RDW 14.2 14.3    281   MPV 10.2 10.8       Radiology:  XR CHEST PORTABLE    Result Date: 6/9/2021  EXAMINATION: ONE XRAY VIEW OF THE CHEST 6/9/2021 2:53 pm COMPARISON: 19 April 2021 HISTORY: ORDERING SYSTEM PROVIDED HISTORY: hypoxia TECHNOLOGIST PROVIDED HISTORY: Reason for exam:->hypoxia FINDINGS: Prior median sternotomy. Heart is enlarged. Pulmonary vascularity appears mildly congested. Airspace disease on the left appears diminished. Airspace disease on the right may be minimally progressive. Neither costophrenic angle is blunted. Airspace disease with interval improvement on the left and probable slight progression on the right. Whether this relates to changing pneumonia or superimposed pulmonary edema is less than certain. CTA PULMONARY W CONTRAST    Result Date: 6/9/2021  EXAMINATION: CTA OF THE CHEST 6/9/2021 5:42 pm TECHNIQUE: CTA of the chest was performed after the administration of intravenous contrast.  Multiplanar reformatted images are provided for review. MIP images are provided for review. Dose modulation, iterative reconstruction, and/or weight based adjustment of the mA/kV was utilized to reduce the radiation dose to as low as reasonably achievable. COMPARISON: April 19 spine HISTORY: ORDERING SYSTEM PROVIDED HISTORY: recent covid in april, hypoxic, r/o PE TECHNOLOGIST PROVIDED HISTORY: Reason for exam:->recent covid in april, hypoxic, r/o PE Decision Support Exception - unselect if not a suspected or confirmed emergency medical condition->Emergency Medical Condition (MA) FINDINGS: Pulmonary Arteries: Pulmonary arteries are adequately opacified for evaluation. No evidence of intraluminal filling defect to suggest pulmonary embolism. Main pulmonary artery is normal in caliber. Mediastinum: Nonspecific hilar and mediastinal nodes, which may be reactive. Cardiomegaly. No pericardial effusion. Lungs/pleura: Multifocal ground-glass and interstitial airspace disease in the lungs bilaterally, less extensive than on the prior examination and most consistent with multifocal pneumonia. Small bilateral pleural effusions, slightly larger on the right. . Images degraded by respiratory motion artifact. Upper Abdomen: Limited images of the upper abdomen are unremarkable. Soft Tissues/Bones:  status post median sternotomy. No acute bony pathology. No evidence of pulmonary emboli. Multifocal ground-glass and interstitial airspace disease in the lungs bilaterally, less extensive than on the prior examination and most consistent with multifocal pneumonia. Small bilateral pleural effusions, slightly larger on the right.  Nonspecific hilar and mediastinal nodes, which may be reactive. Cardiomegaly. Assessment:    Active Problems:    Acute respiratory failure with hypoxia (HCC)    Pneumonia of both lungs due to infectious organism  Resolved Problems:    * No resolved hospital problems. *      Plan:  1. Exacerbation of COPD -continue IV antibiotics, steroids, respiratory support. Respiratory panel negative. Procalcitonin less than 0.02.  Pulmonary feels has scarring/fibrosis and superimposed bronchiolitis. 2.  T2DM - Diabetic diet. Glucose POCT. SS insulin scale. Check HgbA1C if not done within the last 3 months. On April 20th, 2021, hemoglobin A1c 6.5%. Today HgbA1c 6.8%. 3.  CHF -continue meds. 2D echo if none done within the last 6 months to 1 year. 4.  History of COVID-19 in April 2021 -supportive care. 5.  Candida dermatitis - miconazole ointment and powder. NOTE: This report was transcribed using voice recognition software. Every effort was made to ensure accuracy; however, inadvertent computerized transcription errors may be present.     Electronically signed by Cecille Can MD on 6/11/2021 at 11:23 AM

## 2021-06-11 NOTE — PROGRESS NOTES
Physician Progress Note      Sergey Hdez  CSN #:                  [id number]  :                       1955  ADMIT DATE:       2021 2:35 PM  [address] [place] DATE:  RESPONDING  PROVIDER #:        Michele Sherman MD          QUERY TEXT:    Pt admitted with shortness of breath and has CHF documented. If possible,   please document in progress notes and discharge summary further specificity   regarding the acuity of CHF:    The medical record reflects the following:  Risk Factors: h/o COVID  Clinical Indicators: BNP 1889, trace bilateral pedal edema, per H&P   \".  Congestive Heart Failure with preserved EF ; 2D echo in 2018 revealed EF   of 65% with normal LV function . BNP on admission was 1,889 which is slightly   elevated from baseline.  \"  Treatment: Catheline Moulding, IV Bumex    Thank you,  Kendra Hickman, RN, BSN, CDIS  Clinical Documentation Improvement  [email or web address]  Options provided:  -- Acute on Chronic Diastolic CHF/HFpEF  -- Acute Diastolic CHF/HFpEF  -- Chronic Diastolic CHF/HFpEF  -- Other - I will add my own diagnosis  -- Disagree - Not applicable / Not valid  -- Disagree - Clinically unable to determine / Unknown  -- Refer to Clinical Documentation Reviewer    PROVIDER RESPONSE TEXT:    This patient is in acute on chronic diastolic CHF/HFpEF.     Query created by: Sunny López on 2021 10:53 AM      Electronically signed by:  Michele Sherman MD 2021 10:57 AM

## 2021-06-11 NOTE — PROGRESS NOTES
Occupational Therapy  OCCUPATIONAL THERAPY INITIAL EVALUATION    [facility] [place], [place]       MBZP:  Patient Name: Marah Larios  MRN: [id number]  : 1955  Room: [address] [place] [address]    Evaluating OT: Alissa Holley OTR/L [id number]    Referring Provider: Damon Case MD  Specific Provider Orders/Date: eval and treat 6/10/21    Diagnosis: acute respiratory failure with hypoxia. Pt presents to ED from home with SOB and hypoxia.        Pertinent Medical History: asthma, CAD, CHF, COPD, DM, neuropathy, CKD, HTN, OA     Precautions:  fall risk, O2    Assessment of current deficits   [x] Functional mobility  [x]ADLs  [x] Strength               [x]Cognition   [x] Functional transfers   [x] IADLs         [x] Safety Awareness   [x]Endurance   [] Fine Coordination              [x] Balance      [] Vision/perception   []Sensation    []Gross Motor Coordination  [] ROM  [] Delirium                   [] Motor Control     OT PLAN OF CARE   OT POC based on physician orders, patient diagnosis and results of clinical assessment    Frequency/Duration 2-5 days/wk for 10-14 days PRN   Specific OT Treatment Interventions to include:   * Instruction/training on adapted ADL techniques and AE recommendations to increase functional independence within        precautions  * Training on energy conservation strategies, correct breathing pattern and techniques to improve independence/tolerance for self-care routine  * Functional transfer/mobility training/DME recommendations for increased independence, safety, and fall prevention  * Patient/Family education to increase follow through with safety techniques and functional independence  * Recommendation of environmental modifications for increased safety with functional transfers/mobility and ADLs  * Therapeutic exercise to improve motor endurance, ROM, and functional strength for ADLs/functional transfers  * Therapeutic activities to facilitate/challenge dynamic balance, stand tolerance for increased safety and independence with ADLs    Recommended Adaptive Equipment: TBD     Home Living: Pt lives alone in a single story set up. Bathroom setup: walk in shower with seat and grab bar, handicapped height commode     Prior Level of Function: Per pt report Mod I with toileting and grooming. Pd caregiver 5hr/day to assist in bathing, LB dressing and IADLs; Pt reports she is independent with simple meal prep. completed functional mobility with [employer]. Also has cane PRN. Home O2 at night only. Pain Level: no reported pain    Cognition: A&O: 4/4   Problem solving:  WFL   Judgement/safety:  WFL     Functional Assessment: AM-PAC Daily Activity Raw Score: 17/24   Initial Eval Status  Date: 6/11/21 Treatment session:  STGs=LTGS  Timeframe 10-14 days     Feeding Independent  Breakfast tray     Grooming SBA  Standing sink level for hand/face hygiene and mouthcare  Mod I   UB Dressing Min A  Donning/doffing hospital gown  Mod I   LB Dressing Min A  Donning/doffing brief  Mod I    Bathing Mod A  SBA   Toileting Mod A  Significant urine incontinence bed level  Completion of toileting at commode  Use of grab bar for support in transfer  Able to manage brief over hips  Assist in cat care  Mod I   Bed Mobility  Supine to sit: SBA     Functional Transfers STS: SBA  Mod I   Functional Mobility SBA with [employer]  Household distance  Mod I during ADLs   Balance Sitting: fair plus    Standing: fair at [employer]     Activity Tolerance Fair   O2 throughout session  Cues for pacing  Standing marlon x6-7 min with fair plus balance during self care tasks           ADL: Requires UE support at countertop to maintain standing balance during grooming tasks.  Functional endurance training, standing tolerance and incorporation of compensatory strategies during bathroom ADL tasks post incontinent episode this date in preparation for maximum safety and independence during self care tasks in home environment. Min cues throughout session for follow through of proper technique and instructions. Treatment: Patient educated on techniques for completion of ADL, safe functional transfers and functional mobility. Patient required cues for follow through with proper hand/foot placement, pacing, safety, compensatory strategies, breathing and technique in bed mobility, functional transfers, functional mobility, toileting, sponge bathing, grooming, UB dressing and LB dressing in preparation for maximum independence in all self care tasks. Hand Dominance: Right   Strength ROM Additional Info:    RUE  4-/5 WFL good  and FMC/dexterity noted during ADL tasks     LUE 4-/5 WFL good  and FMC/dexterity noted during ADL tasks         Hearing: WFL   Vision: WFL   Sensation:  No c/o numbness or tingling   Tone: WFL                            Rehab Potential: Good for established goals     Patient/Family Goal: To get home. Patient and/or family were instructed on functional diagnosis, prognosis/goals and OT plan of care. Pt verbalized understanding. Upon arrival, patient supine in bed. At end of session, patient seated in armchair with call light and phone within reach, all lines and tubes intact. Pt would benefit from continued skilled OT to increase safety and independence with completion of ADL/IADL tasks for functional independence and quality of life.  Bed/chair alarm: ON    Low Evaluation 77524  Time In: 908   Time Out: 944  Total: 36 min       Min Units   Therapeutic Ex 48576     Therapeutic Activities 15030 3    ADL/Self Care 79215 21 2   Orthotic Management 15955     Neuro Re-Ed 30480     TOTAL TIMED TREATMENT 24 2         Evaluation time includes thorough review of current medical information, gathering information on past medical history/social history and prior level of function, completion of standardized testing/informal observation of tasks, assessment of data, and development of POC/Goals    Darline Cash OTR/L  [id number]

## 2021-06-11 NOTE — PROGRESS NOTES
Comprehensive Nutrition Assessment    Type and Reason for Visit:  Initial, Positive Nutrition Screen    Nutrition Recommendations/Plan: Monitor     Nutrition Assessment:  Pt with positive nutr screen - false. Wt history 217-231# over past year. Admission wt 230# 14.4 oz, no weight loss. PO low, reported that she didn't like her lunch tray this date. With restricted diet and fluids, would not rec ONS at this time. Will monitor for need of ONS if PO does not improve.     Electronically signed by Jayda Botello RD, LD on 6/11/21 at 1:57 PM EDT    Contact: [phone]

## 2021-06-11 NOTE — CARE COORDINATION
CM NOTE: Per QFR---awaiting therapy evals to determine need for rehab. Continue monitoring & TX per pulmonology. +O2 4-5L which she uses at home. +IS. Continue Iv steroids & ATB. Pt worked with PT/OT this am. Oval María good distance & [facility] 18/24. Does not qualify for rehab. CM spoke with pt to discuss therapy & confirm transition of care. Pt pleased she did well as she wants to return home at discharge. Declines need for [address] as she has daily aides for 5hrs. Her grandson Lavinia stays at her home every night so that she is not alone. Will continue to follow pt.

## 2021-06-12 LAB
ANION GAP SERPL CALCULATED.3IONS-SCNC: 4 MMOL/L (ref 7–16)
BASOPHILIC STIPPLING: ABNORMAL
BASOPHILS ABSOLUTE: 0.01 E9/L (ref 0–0.2)
BASOPHILS RELATIVE PERCENT: 0.1 % (ref 0–2)
BUN BLDV-MCNC: 21 MG/DL (ref 6–23)
CALCIUM SERPL-MCNC: 9.2 MG/DL (ref 8.6–10.2)
CHLORIDE BLD-SCNC: 96 MMOL/L (ref 98–107)
CO2: 38 MMOL/L (ref 22–29)
CREAT SERPL-MCNC: 1.1 MG/DL (ref 0.5–1)
EOSINOPHILS ABSOLUTE: 0.01 E9/L (ref 0.05–0.5)
EOSINOPHILS RELATIVE PERCENT: 0.1 % (ref 0–6)
GFR AFRICAN AMERICAN: >60
GFR NON-AFRICAN AMERICAN: >60 ML/MIN/1.73
GLUCOSE BLD-MCNC: 224 MG/DL (ref 74–99)
HCT VFR BLD CALC: 39.2 % (ref 34–48)
HEMOGLOBIN: 11.8 G/DL (ref 11.5–15.5)
HYPOCHROMIA: ABNORMAL
IMMATURE GRANULOCYTES #: 0.04 E9/L
IMMATURE GRANULOCYTES %: 0.5 % (ref 0–5)
LYMPHOCYTES ABSOLUTE: 0.49 E9/L (ref 1.5–4)
LYMPHOCYTES RELATIVE PERCENT: 5.7 % (ref 20–42)
MCH RBC QN AUTO: 30.6 PG (ref 26–35)
MCHC RBC AUTO-ENTMCNC: 30.1 % (ref 32–34.5)
MCV RBC AUTO: 101.6 FL (ref 80–99.9)
METER GLUCOSE: 176 MG/DL (ref 74–99)
METER GLUCOSE: 201 MG/DL (ref 74–99)
METER GLUCOSE: 402 MG/DL (ref 74–99)
MONOCYTES ABSOLUTE: 0.14 E9/L (ref 0.1–0.95)
MONOCYTES RELATIVE PERCENT: 1.6 % (ref 2–12)
NEUTROPHILS ABSOLUTE: 7.92 E9/L (ref 1.8–7.3)
NEUTROPHILS RELATIVE PERCENT: 92 % (ref 43–80)
PDW BLD-RTO: 14.2 FL (ref 11.5–15)
PLATELET # BLD: 267 E9/L (ref 130–450)
PMV BLD AUTO: 10 FL (ref 7–12)
POIKILOCYTES: ABNORMAL
POLYCHROMASIA: ABNORMAL
POTASSIUM REFLEX MAGNESIUM: 4.9 MMOL/L (ref 3.5–5)
RBC # BLD: 3.86 E12/L (ref 3.5–5.5)
SODIUM BLD-SCNC: 138 MMOL/L (ref 132–146)
STOMATOCYTES: ABNORMAL
WBC # BLD: 8.6 E9/L (ref 4.5–11.5)

## 2021-06-12 PROCEDURE — 2500000003 HC RX 250 WO HCPCS: Performed by: NURSE PRACTITIONER

## 2021-06-12 PROCEDURE — 6370000000 HC RX 637 (ALT 250 FOR IP): Performed by: STUDENT IN AN ORGANIZED HEALTH CARE EDUCATION/TRAINING PROGRAM

## 2021-06-12 PROCEDURE — 6370000000 HC RX 637 (ALT 250 FOR IP): Performed by: FAMILY MEDICINE

## 2021-06-12 PROCEDURE — 85025 COMPLETE CBC W/AUTO DIFF WBC: CPT

## 2021-06-12 PROCEDURE — 94660 CPAP INITIATION&MGMT: CPT

## 2021-06-12 PROCEDURE — 2580000003 HC RX 258: Performed by: FAMILY MEDICINE

## 2021-06-12 PROCEDURE — 36415 COLL VENOUS BLD VENIPUNCTURE: CPT

## 2021-06-12 PROCEDURE — 1200000000 HC SEMI PRIVATE

## 2021-06-12 PROCEDURE — 6360000002 HC RX W HCPCS: Performed by: FAMILY MEDICINE

## 2021-06-12 PROCEDURE — 99233 SBSQ HOSP IP/OBS HIGH 50: CPT | Performed by: INTERNAL MEDICINE

## 2021-06-12 PROCEDURE — 2700000000 HC OXYGEN THERAPY PER DAY

## 2021-06-12 PROCEDURE — 99233 SBSQ HOSP IP/OBS HIGH 50: CPT | Performed by: STUDENT IN AN ORGANIZED HEALTH CARE EDUCATION/TRAINING PROGRAM

## 2021-06-12 PROCEDURE — 6360000002 HC RX W HCPCS: Performed by: CLINICAL NURSE SPECIALIST

## 2021-06-12 PROCEDURE — 6370000000 HC RX 637 (ALT 250 FOR IP): Performed by: CLINICAL NURSE SPECIALIST

## 2021-06-12 PROCEDURE — 94640 AIRWAY INHALATION TREATMENT: CPT

## 2021-06-12 PROCEDURE — 80048 BASIC METABOLIC PNL TOTAL CA: CPT

## 2021-06-12 PROCEDURE — 82962 GLUCOSE BLOOD TEST: CPT

## 2021-06-12 RX ORDER — BISACODYL 10 MG
10 SUPPOSITORY, RECTAL RECTAL DAILY PRN
Status: DISCONTINUED | OUTPATIENT
Start: 2021-06-12 | End: 2021-06-16 | Stop reason: HOSPADM

## 2021-06-12 RX ORDER — DOCUSATE SODIUM 100 MG/1
100 CAPSULE, LIQUID FILLED ORAL 2 TIMES DAILY
Status: DISCONTINUED | OUTPATIENT
Start: 2021-06-12 | End: 2021-06-16 | Stop reason: HOSPADM

## 2021-06-12 RX ORDER — BUTALBITAL, ACETAMINOPHEN AND CAFFEINE 50; 325; 40 MG/1; MG/1; MG/1
1 TABLET ORAL EVERY 4 HOURS PRN
Status: DISCONTINUED | OUTPATIENT
Start: 2021-06-12 | End: 2021-06-12 | Stop reason: RX

## 2021-06-12 RX ADMIN — FLUTICASONE PROPIONATE 1 SPRAY: 50 SPRAY, METERED NASAL at 09:37

## 2021-06-12 RX ADMIN — INSULIN LISPRO 4 UNITS: 100 INJECTION, SOLUTION INTRAVENOUS; SUBCUTANEOUS at 16:16

## 2021-06-12 RX ADMIN — NYSTATIN OINTMENT: 100000 OINTMENT TOPICAL at 09:56

## 2021-06-12 RX ADMIN — BUDESONIDE 500 MCG: 0.5 SUSPENSION RESPIRATORY (INHALATION) at 22:36

## 2021-06-12 RX ADMIN — ASPIRIN 81 MG: 81 TABLET, CHEWABLE ORAL at 09:33

## 2021-06-12 RX ADMIN — TROSPIUM CHLORIDE 20 MG: 20 TABLET, FILM COATED ORAL at 06:35

## 2021-06-12 RX ADMIN — HYDRALAZINE HYDROCHLORIDE 50 MG: 50 TABLET ORAL at 18:38

## 2021-06-12 RX ADMIN — PREDNISONE 40 MG: 20 TABLET ORAL at 09:56

## 2021-06-12 RX ADMIN — TRAZODONE HYDROCHLORIDE 100 MG: 50 TABLET ORAL at 20:03

## 2021-06-12 RX ADMIN — HYDRALAZINE HYDROCHLORIDE 50 MG: 50 TABLET ORAL at 09:34

## 2021-06-12 RX ADMIN — BUMETANIDE 1 MG: 0.25 INJECTION INTRAMUSCULAR; INTRAVENOUS at 06:35

## 2021-06-12 RX ADMIN — INSULIN LISPRO 6 UNITS: 100 INJECTION, SOLUTION INTRAVENOUS; SUBCUTANEOUS at 20:06

## 2021-06-12 RX ADMIN — INSULIN LISPRO 2 UNITS: 100 INJECTION, SOLUTION INTRAVENOUS; SUBCUTANEOUS at 06:43

## 2021-06-12 RX ADMIN — MICONAZOLE NITRATE: 2 POWDER TOPICAL at 09:49

## 2021-06-12 RX ADMIN — METOCLOPRAMIDE 5 MG: 10 TABLET ORAL at 20:04

## 2021-06-12 RX ADMIN — METOCLOPRAMIDE 5 MG: 10 TABLET ORAL at 15:13

## 2021-06-12 RX ADMIN — CETIRIZINE HYDROCHLORIDE 10 MG: 10 TABLET, FILM COATED ORAL at 09:34

## 2021-06-12 RX ADMIN — IPRATROPIUM BROMIDE AND ALBUTEROL SULFATE 1 AMPULE: .5; 3 SOLUTION RESPIRATORY (INHALATION) at 17:06

## 2021-06-12 RX ADMIN — Medication 10 ML: at 09:55

## 2021-06-12 RX ADMIN — IPRATROPIUM BROMIDE AND ALBUTEROL SULFATE 1 AMPULE: .5; 3 SOLUTION RESPIRATORY (INHALATION) at 09:20

## 2021-06-12 RX ADMIN — IPRATROPIUM BROMIDE AND ALBUTEROL SULFATE 1 AMPULE: .5; 3 SOLUTION RESPIRATORY (INHALATION) at 12:22

## 2021-06-12 RX ADMIN — DULOXETINE HYDROCHLORIDE 60 MG: 60 CAPSULE, DELAYED RELEASE ORAL at 20:03

## 2021-06-12 RX ADMIN — NYSTATIN OINTMENT: 100000 OINTMENT TOPICAL at 20:05

## 2021-06-12 RX ADMIN — DOCUSATE SODIUM 100 MG: 100 CAPSULE, LIQUID FILLED ORAL at 20:03

## 2021-06-12 RX ADMIN — DULOXETINE HYDROCHLORIDE 60 MG: 60 CAPSULE, DELAYED RELEASE ORAL at 09:35

## 2021-06-12 RX ADMIN — CARVEDILOL 25 MG: 25 TABLET, FILM COATED ORAL at 09:34

## 2021-06-12 RX ADMIN — MICONAZOLE NITRATE: 2 POWDER TOPICAL at 20:05

## 2021-06-12 RX ADMIN — IPRATROPIUM BROMIDE AND ALBUTEROL SULFATE 1 AMPULE: .5; 3 SOLUTION RESPIRATORY (INHALATION) at 22:35

## 2021-06-12 RX ADMIN — BUDESONIDE 500 MCG: 0.5 SUSPENSION RESPIRATORY (INHALATION) at 09:20

## 2021-06-12 RX ADMIN — DOCUSATE SODIUM 100 MG: 100 CAPSULE, LIQUID FILLED ORAL at 13:01

## 2021-06-12 RX ADMIN — MONTELUKAST SODIUM 10 MG: 10 TABLET, FILM COATED ORAL at 20:03

## 2021-06-12 RX ADMIN — ARFORMOTEROL TARTRATE 15 MCG: 15 SOLUTION RESPIRATORY (INHALATION) at 22:35

## 2021-06-12 RX ADMIN — METOCLOPRAMIDE 5 MG: 10 TABLET ORAL at 09:49

## 2021-06-12 RX ADMIN — GABAPENTIN 100 MG: 100 CAPSULE ORAL at 09:34

## 2021-06-12 RX ADMIN — ISOSORBIDE MONONITRATE 60 MG: 60 TABLET, EXTENDED RELEASE ORAL at 09:34

## 2021-06-12 RX ADMIN — ENOXAPARIN SODIUM 40 MG: 40 INJECTION SUBCUTANEOUS at 09:34

## 2021-06-12 RX ADMIN — CARVEDILOL 25 MG: 25 TABLET, FILM COATED ORAL at 18:38

## 2021-06-12 RX ADMIN — Medication 10 ML: at 20:04

## 2021-06-12 RX ADMIN — TROSPIUM CHLORIDE 20 MG: 20 TABLET, FILM COATED ORAL at 18:38

## 2021-06-12 RX ADMIN — ARFORMOTEROL TARTRATE 15 MCG: 15 SOLUTION RESPIRATORY (INHALATION) at 09:19

## 2021-06-12 RX ADMIN — GABAPENTIN 100 MG: 100 CAPSULE ORAL at 20:04

## 2021-06-12 RX ADMIN — ACETAMINOPHEN 650 MG: 325 TABLET ORAL at 14:07

## 2021-06-12 ASSESSMENT — PAIN SCALES - GENERAL
PAINLEVEL_OUTOF10: 0
PAINLEVEL_OUTOF10: 3

## 2021-06-12 NOTE — PROGRESS NOTES
Pulmonary Progress Note    Admit Date: 6/9/2021                            PCP: Arnaldo Scott DO  Active Problems:    Acute respiratory failure with hypoxia ([address]    Pneumonia of both lungs due to infectious organism  Resolved Problems:    * No resolved hospital problems. *      Subjective:  Resting in bed on breathing treatment currently. States her breathing is better. Still with a productive cough. C/o belly ache and tenderness today.  RN aware     Medications:   sodium chloride      dextrose          bumetanide  1 mg Intravenous Daily    predniSONE  40 mg Oral Daily    Followed by   Tiffanie Rueda ON 6/15/2021] predniSONE  30 mg Oral Daily    Followed by   Tiffanie Rueda ON 6/18/2021] predniSONE  20 mg Oral Daily    Followed by   Tiffanie Rueda ON 6/21/2021] predniSONE  10 mg Oral Daily    Arformoterol Tartrate  15 mcg Nebulization BID    budesonide  0.5 mg Nebulization BID    aspirin  81 mg Oral Daily    carvedilol  25 mg Oral BID WC    cetirizine  10 mg Oral Daily    DULoxetine  60 mg Oral BID    [Held by provider] sacubitril-valsartan  1 tablet Oral BID    fluticasone  1 spray Each Nostril Daily    gabapentin  100 mg Oral BID    hydrALAZINE  50 mg Oral Q8H    sodium chloride flush  5-40 mL Intravenous 2 times per day    enoxaparin  40 mg Subcutaneous Daily    insulin lispro  0-12 Units Subcutaneous TID WC    insulin lispro  0-6 Units Subcutaneous Nightly    isosorbide mononitrate  60 mg Oral Daily    metoclopramide  5 mg Oral TID    trospium  20 mg Oral BID AC    montelukast  10 mg Oral Nightly    [Held by provider] spironolactone  25 mg Oral Daily    traZODone  100 mg Oral Nightly    simvastatin  20 mg Oral Nightly    nystatin   Topical BID    miconazole   Topical BID    ipratropium-albuterol  1 ampule Inhalation Q4H WA       Vitals:  VITALS:  BP (!) 173/91   Pulse 91   Temp 97.2 °F (36.2 °C) (Infrared)   Resp 18   Ht 5' 2\" (1.575 m)   Wt 238 lb 3 oz (108 kg)   LMP 01/01/1990   SpO2 98%   BMI 43.57 kg/m²   24HR INTAKE/OUTPUT:      Intake/Output Summary (Last 24 hours) at 2021 0955  Last data filed at 2021 1650  Gross per 24 hour   Intake 150 ml   Output 1350 ml   Net -1200 ml     CURRENT PULSE OXIMETRY:  SpO2: 98 %  24HR PULSE OXIMETRY RANGE:  SpO2  Av.5 %  Min: 90 %  Max: 99 %  CVP:    VENT SETTINGS:   Vent Information  Skin Assessment: Clean, dry, & intact  SpO2: 98 %  Mask Type: Full face mask  Mask Size: Small  Additional Respiratory  Assessments  Pulse: 91  Resp: 18  SpO2: 98 %      EXAM:  General: Alert, in NAD  ENT: No discharge. Pharynx clear. membranes moist   Neck: Supple, Trachea midline. Crowded airway   Resp: No accessory muscle use. Non-labored. Lungs coarse throughout   CV: Regular rate. Regular rhythm. No murmur . No edema. ABD: + tender. Non-distended. Soft, round . Normal bowel sounds. Skin: Warm and dry. M/S: No cyanosis. No joint deformity. No clubbing. Neuro: Awake. Follows commands. O x 3, PEDROZA  Psych:  calm and interactive     I/O: I/O last 3 completed shifts: In: 330 [P.O.:330]  Out: 1350 [Urine:1350]  No intake/output data recorded. Results:  CBC:   Recent Labs     21  1529 21  0545   WBC 9.4 9.1   HGB 12.2 10.7*   HCT 41.3 35.8   MCV 99.5 102.3*    281     BMP:   Recent Labs     21  1529 21  0545 21  1150    140 138   K 4.8 6.0* 5.8*    101 99   CO2 32* 33* 34*   BUN 15 23 23   CREATININE 0.9 1.1* 1.0     LFT:   Recent Labs     21  1529   ALKPHOS 74   ALT 17   AST 11   PROT 7.0   BILITOT 0.3   LABALBU 3.6     PT/INR: No results for input(s): PROTIME, INR in the last 72 hours. Procalcitonin:   Recent Labs     21  2100   PROCAL <0.02     Cultures:  Results for Dordarcy Holt (MRN [id number]) as of 2021 13:38   Ref.  Range 2021 21:00   Influenza A by PCR Latest Ref Range: Not Detected  Not Detected   Influenza B by PCR Latest Ref Range: Not Detected  Not Detected progression on the right. Whether this relates to changing pneumonia or superimposed pulmonary edema is less than certain. CTA PULMONARY W CONTRAST    Result Date: 6/9/2021  EXAMINATION: CTA OF THE CHEST 6/9/2021 5:42 pm TECHNIQUE: CTA of the chest was performed after the administration of intravenous contrast.  Multiplanar reformatted images are provided for review. MIP images are provided for review. Dose modulation, iterative reconstruction, and/or weight based adjustment of the mA/kV was utilized to reduce the radiation dose to as low as reasonably achievable. COMPARISON: April 19 spine HISTORY: ORDERING SYSTEM PROVIDED HISTORY: recent covid in april, hypoxic, r/o PE TECHNOLOGIST PROVIDED HISTORY: Reason for exam:->recent covid in april, hypoxic, r/o PE Decision Support Exception - unselect if not a suspected or confirmed emergency medical condition->Emergency Medical Condition (MA) FINDINGS: Pulmonary Arteries: Pulmonary arteries are adequately opacified for evaluation. No evidence of intraluminal filling defect to suggest pulmonary embolism. Main pulmonary artery is normal in caliber. Mediastinum: Nonspecific hilar and mediastinal nodes, which may be reactive. Cardiomegaly. No pericardial effusion. Lungs/pleura: Multifocal ground-glass and interstitial airspace disease in the lungs bilaterally, less extensive than on the prior examination and most consistent with multifocal pneumonia. Small bilateral pleural effusions, slightly larger on the right. . Images degraded by respiratory motion artifact. Upper Abdomen: Limited images of the upper abdomen are unremarkable. Soft Tissues/Bones:  status post median sternotomy. No acute bony pathology. No evidence of pulmonary emboli. Multifocal ground-glass and interstitial airspace disease in the lungs bilaterally, less extensive than on the prior examination and most consistent with multifocal pneumonia.  Small bilateral pleural effusions, slightly larger on the right. Nonspecific hilar and mediastinal nodes, which may be reactive. Cardiomegaly. 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 )IV steroids 40 Q12H ok to reduce to oral taper   6.) continue flonase and zyrtec   7.)on acynhnx79 QD  8.) on spiriva and symbicort at home-add brovana and budesonide here   9.) supportive care  10.)wears trilogy at home ,  wearing cpap  5 at hs here , back to NIV when DC  11.) Stable from a pulmonary standpoint         Electronically signed by PATRIZIA Che on 6/12/2021 at 9:55 AM    Attending Attestation Note:    Patient seen and examined with NP. I agree with above.     In addition, the following apply:    - PO steroids with taper over 20 days 40/30/20/10 prednisone 3 days each   - continue NIV support with oxygen  - await D/C planning   - procalcitonin < 0.02, D/C abx  - look to D/C planning for patient     Beverley Fernandez MD  6/12/2021  1:58 PM

## 2021-06-12 NOTE — PROGRESS NOTES
[facility] Progress Note    Admitting Date and Time: 6/9/2021  2:35 PM  Admit Dx: Acute respiratory failure with hypoxia ([facility] [address] [J96.01]    Subjective:  Patient is being followed for Acute respiratory failure with hypoxia ([facility] [address] [J96.01]   Pt reports diffuse headache, lower abdominal pain and constipation. Per RN: No major concerns. ROS: denies fever, chills, cp, sob, n/v, HA unless stated above.      docusate sodium  100 mg Oral BID    bumetanide  1 mg Intravenous Daily    predniSONE  40 mg Oral Daily    Followed by   Earnstine Laser ON 6/15/2021] predniSONE  30 mg Oral Daily    Followed by   Earnstine Laser ON 6/18/2021] predniSONE  20 mg Oral Daily    Followed by   Earnstine Laser ON 6/21/2021] predniSONE  10 mg Oral Daily    Arformoterol Tartrate  15 mcg Nebulization BID    budesonide  0.5 mg Nebulization BID    aspirin  81 mg Oral Daily    carvedilol  25 mg Oral BID WC    cetirizine  10 mg Oral Daily    DULoxetine  60 mg Oral BID    [Held by provider] sacubitril-valsartan  1 tablet Oral BID    fluticasone  1 spray Each Nostril Daily    gabapentin  100 mg Oral BID    hydrALAZINE  50 mg Oral Q8H    sodium chloride flush  5-40 mL Intravenous 2 times per day    enoxaparin  40 mg Subcutaneous Daily    insulin lispro  0-12 Units Subcutaneous TID WC    insulin lispro  0-6 Units Subcutaneous Nightly    isosorbide mononitrate  60 mg Oral Daily    metoclopramide  5 mg Oral TID    trospium  20 mg Oral BID AC    montelukast  10 mg Oral Nightly    [Held by provider] spironolactone  25 mg Oral Daily    traZODone  100 mg Oral Nightly    simvastatin  20 mg Oral Nightly    nystatin   Topical BID    miconazole   Topical BID    ipratropium-albuterol  1 ampule Inhalation Q4H WA     bisacodyl, 10 mg, Daily PRN  baclofen, 10 mg, BID PRN  sodium chloride flush, 5-40 mL, PRN  sodium chloride, 25 mL, PRN  polyethylene glycol, 17 g, Daily PRN  acetaminophen, 650 mg, Q6H PRN   Or  acetaminophen, 650 mg, Q6H PRN  guaiFENesin, 400 mg, 4x Daily PRN  glucose, 15 g, PRN  dextrose, 12.5 g, PRN  glucagon (rDNA), 1 mg, PRN  dextrose, 100 mL/hr, PRN  albuterol, 2.5 mg, Q2H PRN         Objective:    BP (!) 173/91   Pulse 91   Temp 97.2 °F (36.2 °C) (Infrared)   Resp 18   Ht 5' 2\" (1.575 m)   Wt 238 lb 3 oz (108 kg)   LMP 01/01/1990   SpO2 98%   BMI 43.57 kg/m²     General Appearance: alert and oriented to person, place and time and in no acute distress, obese  Skin: warm and dry  Head: normocephalic and atraumatic  Eyes: pupils equal, round, and reactive to light, extraocular eye movements intact, conjunctivae normal  Neck: neck supple and non tender without mass, no JVD.   Pulmonary/Chest: clear to auscultation bilaterally- no wheezes, rales or rhonchi, normal air movement, no respiratory distress  Cardiovascular: normal rate, normal S1 and S2 and no carotid bruits  Abdomen: soft, + tender, non-distended, normal bowel sounds, no masses or organomegaly  Extremities: no cyanosis, no clubbing and no edema  Neurologic: no cranial nerve deficit and speech normal        Recent Labs     06/09/21  1529 06/11/21  0545 06/11/21  1150    140 138   K 4.8 6.0* 5.8*    101 99   CO2 32* 33* 34*   BUN 15 23 23   CREATININE 0.9 1.1* 1.0   GLUCOSE 115* 192* 186*   CALCIUM 9.2 8.7 9.0       Recent Labs     06/09/21  1529 06/11/21  0545   WBC 9.4 9.1   RBC 4.15 3.50   HGB 12.2 10.7*   HCT 41.3 35.8   MCV 99.5 102.3*   MCH 29.4 30.6   MCHC 29.5* 29.9*   RDW 14.2 14.3    281   MPV 10.2 10.8       Micro:  No components found for: [facility])    Radiology:   Echo Complete    Result Date: 6/11/2021  Transthoracic Echocardiography Report (TTE)  Demographics   Patient Name    [facility] [place] [facility] - [facility]         Gender            Female                  [address] [place] [address]  [phone]       Room Number       0602  Number   Account #       [de-identified]      Procedure Date    06/11/2021   Corporate ID                   Ordering Physician   Accession       [id number]     Referring         Kermitt Habermann  Number                         Physician         Kari Walsh    Date of Birth   1955     Sonographer       Amy Atha Denver RCS   Age             76 year(s)     Interpreting      [address] [place] [address]                                 Physician         Physician Cardiology                                                   Viviana Strong MD                                  Any Other  Procedure Type of Study   TTE procedure:Echo Complete W/Doppler & Color Flow. Procedure Date Date: 06/11/2021 Start: 07:42 AM Study Location: Portable Technical Quality: Poor visualization due to body habitus. Indications:Congestive heart failure and Dyspnea/SOB. Patient Status: Routine Contrast Medium: Definity. Height: 62 inches Weight: 230 pounds BSA: 2.03 m^2 BMI: 42.07 kg/m^2 HR: 75 bpm BP: 140/73 mmHg  Findings   Left Ventricle  Left ventricle size is normal.  Ejection fraction is visually estimated at 55-60%. No regional wall motion abnormalities seen. Severe concentric left ventricular hypertrophy. Indeterminate diastolic function. Right Ventricle  Normal right ventricular size. Right ventricle global systolic function is mildly reduced . TAPSE 15 mm. Left Atrium  Left atrium is of normal size. Right Atrium  Normal right atrium size. Mitral Valve  Normal mitral valve structure and function. No evidence of mitral valve stenosis. Physiologic and/or trace mitral regurgitation is present. Tricuspid Valve  The tricuspid valve was not well visualized. Physiologic and/or trace tricuspid regurgitation. RVSP is 26 mmHg. Normal estimated PA systolic pressure. Aortic Valve  The aortic valve appears mildly sclerotic. Individual aortic valve leaflets are not clearly visualized. No hemodynamically significant aortic stenosis is present. No evidence of aortic valve regurgitation. Pulmonic Valve  The pulmonic valve was not well visualized. No evidence of any pulmonic regurgitation. No evidence of pulmonic valve stenosis. Pericardial Effusion  No evidence for hemodynamically significant pericardial effusion. Pleural Effusion  No evidence of pleural effusion. Aorta  Normal aortic root and ascending aorta. Miscellaneous  Inferior Vena Cava not well visualized. Conclusions   Summary  Left ventricle size is normal.  Ejection fraction is visually estimated at 55-60%. No regional wall motion abnormalities seen. Severe concentric left ventricular hypertrophy. Indeterminate diastolic function. Normal right ventricular size. Right ventricle global systolic function is mildly reduced . TAPSE 15 mm. No hemodynamically significant aortic stenosis is present. Physiologic and/or trace tricuspid regurgitation. RVSP is 26 mmHg. Normal estimated PA systolic pressure. No evidence for hemodynamically significant pericardial effusion.    Signature   ----------------------------------------------------------------  Electronically signed by Piero Cm MD(Interpreting  physician) on 06/11/2021 07:42 PM  ----------------------------------------------------------------  M-Mode/2D Measurements & Calculations   LV Diastolic    LV Systolic Dimension: 3.7   AV Cusp Separation: 1 cmLA  Dimension: 5.2  cm                           Dimension: 5.3 cmAO Root  cm              LV Volume Diastolic: 592.5   Dimension: 2.2 cm  LV FS:28.9 %    ml  LV PW           LV Volume Systolic: 80.8 ml  Diastolic: 1.3  LV EDV/LV EDV Index: 131.9  cm              ml/65 ml/m^2LV ESV/LV ESV    RV Diastolic Dimension: 3.1  LV PW Systolic: Index: 37.0 PN/25LK/ m^2     cm  1.5 cm          EF Calculated: 55.1 %  Septum          LV Mass Index: 137 l/min*m^2 LA/Aorta: 4.93  Diastolic: 1.3                               Ascending Aorta: 3.2 cm  cm                                           LA volume/Index: 62 ml  Septum          LVOT: 2 cm                   /84KL/F^3  Systolic: 1.5 RA Area: 16.9 cm^2  cm  CO: 6.05 l/min                               IVC Expiration: 1.8 cm  CI: 2.98  l/m*m^2  LV Mass: 278.44  g  Doppler Measurements & Calculations   MV Peak E-Wave: 1.14 AV Peak Velocity:     LVOT Peak Velocity: 1.14 m/s  m/s                  1.55 m/s              LVOT Mean Velocity: 0.93 m/s  MV Peak A-Wave: 1.04 AV Peak Gradient:     LVOT Peak Gradient: 5.2  m/s                  9.62 mmHg             mmHgLVOT Mean Gradient: 3.6  MV E/A Ratio: 1.1    AV Mean Velocity:     mmHg  MV Peak Gradient:    1.07 m/s              Estimated RVSP: 26 mmHg  9.7 mmHg             AV Mean Gradient: 5.1 Estimated RAP:8 mmHg  MV Mean Gradient: 5  mmHg  mmHg                 AV VTI: 32.6 cm  MV Mean Velocity:    AV Area               TR Velocity:2.14 m/s  1.08 m/s             (Continuity):2.48     TR Gradient:18.32 mmHg  MV Deceleration      cm^2                  PV Peak Velocity: 0.8 m/s  Time: 200.6 msec                           PV Peak Gradient: 2.59 mmHg  MV P1/2t: 58.8 msec  LVOT VTI: 25.7 cm     PV Mean Velocity: 0.55 m/s  MVA by PHT:3.74 cm^2 IVRT: 106.1 msec      PV Mean Gradient: 1.4 mmHg  MV Area              Estimated PASP: 26.32  (continuity): 1.9    mmHg  cm^2  MV E' Septal  Velocity: 0.06 m/s  MV E' Lateral  Velocity: 6 m/s  [email or web address] [id number]      Assessment:    Active Problems:    Acute respiratory failure with hypoxia (HCC)    Pneumonia of both lungs due to infectious organism  Resolved Problems:    * No resolved hospital problems. *      Plan:    1.   Acute on chronic hypoxic and hypercapnic respiratory failure likely secondary to exacerbation of COPD -patient currently on 4 L which is her baseline at home (uses Trelegy, CPAP 5 nightly at home), on steroids, respiratory support.  Respiratory panel negative.  Procalcitonin less than 0.02.  Pulmonary feels has scarring/fibrosis and superimposed bronchiolitis, post Covid. Patient off antibiotics, on nebulizations. Pulmonology okay for discharge on p.o. steroids. 2.  T2DM - Diabetic diet.  Glucose POCT.  SS insulin scale. On April 20th, 2021, hemoglobin A1c 6.5%. 6/11- HgbA1c 6.8%. Will need tighter control on steroids. 3.  Acute on chronic diastolic CHF -continue meds.  2D echo - 6/11 -ejection fraction 55 to 60% with severe concentric LVH, diastolic dysfunction, RVSP 26. On Bumex, Coreg, hydralazine, Imdur, Entresto and spironolactone being held due to hyperkalemia, resolved today, will restart and monitor. 4.  History of COVID-19 in April 2021 -supportive care. 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 5.  Candida dermatitis - miconazole ointment and powder. 6.  Constipation with lower abdominal discomfort -added laxatives. 7.  CAD s/p CABG -on optimal medical management. 8.  Hyperlipidemia  9. Hypertension  10. Chronic tobacco abuse  11 Hypokalemia -resolved, Entresto and spironolactone to be restarted. DVT prophylaxis -Lovenox  NOTE: This report was transcribed using voice recognition software. Every effort was made to ensure accuracy; however, inadvertent computerized transcription errors may be present.   Electronically signed by Andrea Pearson MD on 6/12/2021 at 2:38 PM

## 2021-06-12 NOTE — PROGRESS NOTES
[facility] Cardiology Inpatient Progress Note    Patient is a 76 y.o. female of Nasrin Cooper DO seen in hospital follow up. Chief complaint: Acute on chronic HFrEF    HPI: Some SOB. No CP. Complains of cough.     Patient Active Problem List   Diagnosis    Morbid obesity due to excess calories (HCC)    Hyperlipidemia    DAYAN and COPD overlap syndrome (HCC)    Vitamin D insufficiency    Chronic combined systolic and diastolic heart failure ([address]    Essential hypertension    GERD (gastroesophageal reflux disease)    Major depressive disorder, recurrent episode, mild ([address]    Diabetic polyneuropathy associated with type 2 diabetes mellitus (HCC)    Chronic passive hepatic congestion    Mixed incontinence urge and stress    Chronic back pain - d/t muscle spasm    Glaucoma, open angle    Elevated CA 19-9 level    Lumbar stenosis    Spondylosis 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    Lumbar disc herniation    Asymmetric septal hypertrophy (HCC)    Non-compliance    Hiatal hernia    Melanosis coli    Coronary artery disease involving native coronary artery of native heart without angina pectoris    DM2 (diabetes mellitus, type 2) ([address]    Cigarette smoker    Marijuana use, smoked    Ischemic cardiomyopathy    PVD (peripheral vascular disease) with claudication (HCC)    Chronic venous insufficiency    History of non-ST elevation myocardial infarction (NSTEMI)    QT prolongation    Cerebellar infarct (HCC)    COPD exacerbation (HCC)    Chronic respiratory failure with hypoxia and hypercapnia (HCC)    Acute respiratory failure with hypoxia (HCC)    Controlled type 2 diabetes mellitus without complication (HCC)    Coronavirus infection    E. coli UTI    COPD (chronic obstructive pulmonary disease) (HCC)    Pneumonia of both lungs due to infectious organism    Hyperkalemia    Uncontrolled type 2 diabetes mellitus with hyperglycemia ([address]    Status post peripheral artery angioplasty    Uncontrolled hypertension    Hypertensive urgency    Acute respiratory disease due to 2019 novel coronavirus    Pneumonia due to COVID-19 virus    Throat swelling       Allergies   Allergen Reactions    Latex Hives    Bee Venom Anaphylaxis    Dilaudid [Hydromorphone Hcl] Itching    Dye [Iodides] Hives and Shortness Of Breath    Percocet [Oxycodone-Acetaminophen] Shortness Of Breath and Itching    Keflex [Cephalexin] Itching and Rash    Lasix [Furosemide] Other (See Comments)     Pt states she cramps up and gets headaches    Levaquin [Levofloxacin In D5w] Hives    Lipitor      MUSCLE SPASMS    Lyrica [Pregabalin]      Dream disturbances    Morphine Hives    Naproxen      Unsure of reaction;pt states able to take Aleve without difficulties    Nefazodone Other (See Comments)    Norvasc [Amlodipine] Swelling    Oxycodone-Acetaminophen Swelling    Shellfish-Derived Products     Trazodone And Nefazodone        Current Facility-Administered Medications   Medication Dose Route Frequency Provider Last Rate Last Admin    bumetanide (BUMEX) injection 1 mg  1 mg Intravenous Daily PATRIZIA Costello CNP   1 mg at 06/12/21 0635    predniSONE (DELTASONE) tablet 40 mg  40 mg Oral Daily PATRIZIA Perdomo CNS   40 mg at 06/12/21 0956    Followed by   Kellie Gonzalez ON 6/15/2021] predniSONE (DELTASONE) tablet 30 mg  30 mg Oral Daily PATRIZIA Perdomo        Followed by   Kellie Gonzalez ON 6/18/2021] predniSONE (DELTASONE) tablet 20 mg  20 mg Oral Daily PATRIZIA Perdomo        Followed by   Kellie Gonzalez ON 6/21/2021] predniSONE (DELTASONE) tablet 10 mg  10 mg Oral Daily PATRIZIA Perdomo        Arformoterol Tartrate (BROVANA) nebulizer solution 15 mcg  15 mcg Nebulization BID PATRIZIA Perdoom   15 mcg at 06/12/21 0919    budesonide (PULMICORT) nebulizer suspension 500 mcg  0.5 mg Nebulization BID PATRIZIA Perdomo   500 mcg at 06/12/21 0920    aspirin chewable tablet 81 mg  81 mg Oral Daily Jaylon Das MD   81 mg at 06/12/21 0933    baclofen (LIORESAL) tablet 10 mg  10 mg Oral BID PRN Jaylon Das MD   10 mg at 06/11/21 2147    carvedilol (COREG) tablet 25 mg  25 mg Oral BID WC Jaylon Das MD   25 mg at 06/12/21 0934    cetirizine (ZYRTEC) tablet 10 mg  10 mg Oral Daily Jaylon Das MD   10 mg at 06/12/21 0934    DULoxetine (CYMBALTA) extended release capsule 60 mg  60 mg Oral BID Jaylon Das MD   60 mg at 06/12/21 0935    [Held by provider] sacubitril-valsartan (ENTRESTO) 24-26 MG per tablet 1 tablet  1 tablet Oral BID Jaylon Das MD   1 tablet at 06/11/21 1005    fluticasone (FLONASE) 50 MCG/ACT nasal spray 1 spray  1 spray Each Nostril Daily Jaylon Das MD   1 spray at 06/12/21 0937    gabapentin (NEURONTIN) capsule 100 mg  100 mg Oral BID Jaylon Das MD   100 mg at 06/12/21 0934    hydrALAZINE (APRESOLINE) tablet 50 mg  50 mg Oral Q8H Jaylon Das MD   50 mg at 06/12/21 0934    sodium chloride flush 0.9 % injection 5-40 mL  5-40 mL Intravenous 2 times per day Jaylon Das MD   10 mL at 06/12/21 0955    sodium chloride flush 0.9 % injection 5-40 mL  5-40 mL Intravenous PRN Jaylon Das MD   10 mL at 06/11/21 1202    0.9 % sodium chloride infusion  25 mL Intravenous PRN Jaylon Das MD        enoxaparin (LOVENOX) injection 40 mg  40 mg Subcutaneous Daily Jaylon Das MD   40 mg at 06/12/21 0934    polyethylene glycol (GLYCOLAX) packet 17 g  17 g Oral Daily PRN Jaylon Das MD        acetaminophen (TYLENOL) tablet 650 mg  650 mg Oral Q6H PRN Jaylon Das MD   650 mg at 06/11/21 2147    Or    acetaminophen (TYLENOL) suppository 650 mg  650 mg Rectal Q6H PRN Jaylon Solum, MD        guaiFENesin tablet 400 mg  400 mg Oral 4x Daily PRN Jaylon Das MD   400 mg at 06/11/21 7728    insulin lispro (HUMALOG) injection vial 0-12 Units  0-12 Units Subcutaneous TID  Jaylon Das MD   2 Units at 06/12/21 3627    insulin lispro (HUMALOG) injection vial 0-6 Units  0-6 Units Subcutaneous Nightly Jaylon Das MD   3 Units at 06/11/21 2144    glucose (GLUTOSE) 40 % oral gel 15 g  15 g Oral PRN Jaylon Das MD        dextrose 50 % IV solution  12.5 g Intravenous PRN Jaylon Das MD        glucagon (rDNA) injection 1 mg  1 mg Intramuscular PRN Jaylon Das MD        dextrose 5 % solution  100 mL/hr Intravenous PRN Jaylon Das MD        isosorbide mononitrate (IMDUR) extended release tablet 60 mg  60 mg Oral Daily Jaylon Das MD   60 mg at 06/12/21 0934    metoclopramide (REGLAN) tablet 5 mg  5 mg Oral TID Jaylon Das MD   5 mg at 06/12/21 0949    trospium (SANCTURA) tablet 20 mg  20 mg Oral BID AC Jaylon Das MD   20 mg at 06/12/21 0635    montelukast (SINGULAIR) tablet 10 mg  10 mg Oral Nightly Jaylon aDs MD   10 mg at 06/11/21 2136    [Held by provider] spironolactone (ALDACTONE) tablet 25 mg  25 mg Oral Daily Jaylon Das MD   25 mg at 06/11/21 1006    traZODone (DESYREL) tablet 100 mg  100 mg Oral Nightly Jaylon Das MD   100 mg at 06/11/21 2136    simvastatin (ZOCOR) tablet 20 mg  20 mg Oral Nightly Jaylon Das MD        albuterol (PROVENTIL) nebulizer solution 2.5 mg  2.5 mg Nebulization Q2H PRN Jaylon Das MD        nystatin (MYCOSTATIN) ointment   Topical BID Marcos Sharpe MD   Given at 06/12/21 0956    miconazole (MICOTIN) 2 % powder   Topical BID Marcos Sharpe MD   Given at 06/12/21 0949    ipratropium-albuterol (DUONEB) nebulizer solution 1 ampule  1 ampule Inhalation Q4H WA Marcos Sharpe MD   1 ampule at 06/12/21 0920     Review of systems:   Heart: as above   Lungs: as above   Eyes: denies changes in vision or discharge. Ears: denies changes in hearing or pain. Nose: denies epistaxis or masses   Throat: denies sore throat or trouble swallowing. Neuro: denies numbness, tingling, tremors. Skin: denies rashes or itching.    : denies hematuria, dysuria   GI: denies vomiting, diarrhea   Psych: denies mood changed, anxiety, depression. Physical Exam   BP (!) 173/91   Pulse 91   Temp 97.2 °F (36.2 °C) (Infrared)   Resp 18   Ht 5' 2\" (1.575 m)   Wt 238 lb 3 oz (108 kg)   LMP 01/01/1990   SpO2 98%   BMI 43.57 kg/m²   Constitutional: Oriented to person, place, and time. No distress. Well developed. Head: Normocephalic and atraumatic. Neck: Neck supple. No hepatojugular reflux. No JVD present. Carotid bruit is not present. No tracheal deviation present. No thyromegaly present. Cardiovascular: Normal rate, regular rhythm, normal heart sounds. intact distal pulses. No gallop and no friction rub. No murmur heard. Pulmonary: Breath sounds normal. No respiratory distress. No wheezes. No rales. Chest: Effort normal. No tenderness. Abdominal: Soft. Bowel sounds are normal. No distension or mass. No tenderness, rebound or guarding. Musculoskeletal: . No tenderness. No clubbing or cyanosis. Extremitites: Intact distal pulses. No edema  Neurological: Alert and oriented to person, place, and time. Skin: Skin is warm and dry. No rash noted. Not diaphoretic. No erythema. Psychiatric: Normal mood and affect. Behavior is normal.   Lymphadenopathy: No cervical adenopathy. No groin adenopathy.       CBC:   Lab Results   Component Value Date    WBC 9.1 06/11/2021    RBC 3.50 06/11/2021    HGB 10.7 06/11/2021    HCT 35.8 06/11/2021    .3 06/11/2021    MCH 30.6 06/11/2021    MCHC 29.9 06/11/2021    RDW 14.3 06/11/2021     06/11/2021    MPV 10.8 06/11/2021     BMP:   Lab Results   Component Value Date     06/11/2021    K 5.8 06/11/2021    K 6.0 06/11/2021    CL 99 06/11/2021    CO2 34 06/11/2021    BUN 23 06/11/2021    LABALBU 3.6 06/09/2021    LABALBU 5.2 12/01/2011    CREATININE 1.0 06/11/2021    CALCIUM 9.0 06/11/2021    GFRAA >60 06/11/2021    LABGLOM >60 06/11/2021     Magnesium:    Lab Results   Component Value Date MG 2.0 04/13/2019     Cardiac Enzymes:   Lab Results   Component Value Date    CKTOTAL 88 01/05/2016    CKTOTAL 85 11/12/2015    CKTOTAL 124 04/18/2015    CKMB 1.2 01/05/2016    CKMB 2.8 11/12/2015    CKMB 1.6 04/18/2015    TROPONINI <0.01 04/19/2021    TROPONINI <0.01 03/12/2021    TROPONINI <0.01 09/19/2019      PT/INR:    Lab Results   Component Value Date    PROTIME 12.6 04/19/2021    PROTIME 12.6 04/22/2011    INR 1.1 04/19/2021     TSH:    Lab Results   Component Value Date    TSH 0.646 02/05/2021       CTA pulmonary left contrast: 6/9/2021  No evidence of pulmonary emboli.       Multifocal ground-glass and interstitial airspace disease in the lungs   bilaterally, less extensive than on the prior examination and most consistent   with multifocal pneumonia.       Small bilateral pleural effusions, slightly larger on the right.       Nonspecific hilar and mediastinal nodes, which may be reactive.       Cardiomegaly.         Chest x-ray: 6/9/2021:  Airspace disease with interval improvement on the left and probable slight   progression on the right. Whether this relates to changing pneumonia or   superimposed pulmonary edema is less than certain.         Echo Summary 6/11/2021:   Left ventricle size is normal.   Ejection fraction is visually estimated at 55-60%. No regional wall motion abnormalities seen. Severe concentric left ventricular hypertrophy. Indeterminate diastolic function. Normal right ventricular size. Right ventricle global systolic function is mildly reduced . TAPSE 15 mm. No hemodynamically significant aortic stenosis is present. Physiologic and/or trace tricuspid regurgitation. RVSP is 26 mmHg. Normal estimated PA systolic pressure. No evidence for hemodynamically significant pericardial effusion.     ASSESSMENT & PLAN:    Patient Active Problem List   Diagnosis    Morbid obesity due to excess calories (HCC)    Hyperlipidemia    DAYAN and COPD overlap syndrome ([address]    Vitamin D insufficiency    Chronic combined systolic and diastolic heart failure (HCC)    Essential hypertension    GERD (gastroesophageal reflux disease)    Major depressive disorder, recurrent episode, mild (HCC)    Diabetic polyneuropathy associated with type 2 diabetes mellitus (HCC)    Chronic passive hepatic congestion    Mixed incontinence urge and stress    Chronic back pain - d/t muscle spasm    Glaucoma, open angle    Elevated CA 19-9 level    Lumbar stenosis    Spondylosis 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    Lumbar disc herniation    Asymmetric septal hypertrophy (HCC)    Non-compliance    Hiatal hernia    Melanosis coli    Coronary artery disease involving native coronary artery of native heart without angina pectoris    DM2 (diabetes mellitus, type 2) ([facility]    Cigarette smoker    Marijuana use, smoked    Ischemic cardiomyopathy    PVD (peripheral vascular disease) with claudication (HCC)    Chronic venous insufficiency    History of non-ST elevation myocardial infarction (NSTEMI)    QT prolongation    Cerebellar infarct (HCC)    COPD exacerbation (HCC)    Chronic respiratory failure with hypoxia and hypercapnia (HCC)    Acute respiratory failure with hypoxia (HCC)    Controlled type 2 diabetes mellitus without complication (HCC)    Coronavirus infection    E. coli UTI    COPD (chronic obstructive pulmonary disease) (HCC)    Pneumonia of both lungs due to infectious organism    Hyperkalemia    Uncontrolled type 2 diabetes mellitus with hyperglycemia (HCC)    Status post peripheral artery angioplasty    Uncontrolled hypertension    Hypertensive urgency    Acute respiratory disease due to 2019 novel coronavirus    Pneumonia due to COVID-19 virus    Throat swelling     1. Acute on chronic diastolic CHF:    -2.4 liters. Bumex. Coreg/hydralazine/imdur.  Hold Aldactone/entresto on hold due to elevated K.     2. CAD-CABG: Hx of LIMA to LAD, SVG to OM1 underwent in 2011. Nonischemic Lexiscan 6/5/2018. ASA/BB/statin/imdur. 3. HTN: Observe. 3. Lipids: Intolerant to statins. 4. Asthma    5. Chronic tobacco use    6. DAYAN    7. DM: Per primary service. 8. Hyperkalemia: Follow labs. Romana Schwalbe, D.O.   Cardiologist  Cardiology, [id number]

## 2021-06-12 NOTE — PLAN OF CARE
Problem: Pain:  Description: Pain management should include both nonpharmacologic and pharmacologic interventions.   Goal: Pain level will decrease  Description: Pain level will decrease  6/11/2021 2332 by Catherine Madsen RN  Outcome: Ongoing  6/11/2021 1056 by Raimundo Wright RN  Outcome: Ongoing  Goal: Control of acute pain  Description: Control of acute pain  6/11/2021 2332 by Catherine Madsen RN  Outcome: Ongoing  6/11/2021 1056 by Raimundo Wright RN  Outcome: Ongoing  Goal: Control of chronic pain  Description: Control of chronic pain  6/11/2021 2332 by Catherine Madsen RN  Outcome: Ongoing  6/11/2021 1056 by Raimundo Wright RN  Outcome: Ongoing     Problem: Skin Integrity:  Goal: Will show no infection signs and symptoms  Description: Will show no infection signs and symptoms  6/11/2021 2332 by Catherine Madsen RN  Outcome: Ongoing  6/11/2021 1056 by Raimundo Wright RN  Outcome: Ongoing  Goal: Absence of new skin breakdown  Description: Absence of new skin breakdown  6/11/2021 2332 by Catherine Madsen RN  Outcome: Ongoing  6/11/2021 1056 by Raimundo Wright RN  Outcome: Ongoing     Problem: Falls - Risk of:  Goal: Will remain free from falls  Description: Will remain free from falls  6/11/2021 2332 by Catherine Madsen RN  Outcome: Ongoing  6/11/2021 1056 by Raimundo Wright RN  Outcome: Ongoing  Goal: Absence of physical injury  Description: Absence of physical injury  6/11/2021 2332 by Catherine Madsen RN  Outcome: Ongoing  6/11/2021 1056 by Raimundo Wright RN  Outcome: Ongoing
Problem: Pain:  Goal: Pain level will decrease  Description: Pain level will decrease  Outcome: Ongoing  Goal: Control of acute pain  Description: Control of acute pain  Outcome: Ongoing  Goal: Control of chronic pain  Description: Control of chronic pain  Outcome: Ongoing     Problem: Skin Integrity:  Goal: Will show no infection signs and symptoms  Description: Will show no infection signs and symptoms  Outcome: Ongoing  Goal: Absence of new skin breakdown  Description: Absence of new skin breakdown  Outcome: Ongoing     Problem: Falls - Risk of:  Goal: Will remain free from falls  Description: Will remain free from falls  Outcome: Ongoing  Goal: Absence of physical injury  Description: Absence of physical injury  Outcome: Ongoing
n/a

## 2021-06-12 NOTE — PROGRESS NOTES
[facility] Quality Flow/Interdisciplinary Rounds Progress Note        Quality Flow Rounds held on June 12, 2021    Disciplines Attending:  Bedside Nurse, ,  and Nursing Unit Leadership    Isidro Turner was admitted on 6/9/2021  2:35 PM    Anticipated Discharge Date:  Expected Discharge Date: 06/14/21    Disposition:    Jadon Score:  Jadon Scale Score: 18    Readmission Risk              Risk of Unplanned Readmission:  35           Discussed patient goal for the day, patient clinical progression, and barriers to discharge. The following Goal(s) of the Day/Commitment(s) have been identified: Continues to receive Bumex IV daily, monitor vital signs, attempt to wean oxygen, check Pulmonary plan, discharge planning to return home.       Miranda Collazo RN  June 12, 2021

## 2021-06-12 NOTE — PROGRESS NOTES
Rito Chen, APRN, covering for Dr. Robbi Perales, notified via [employer] of Dr. Houston Kelly request about restarting Entresco and Aldactone since potassium lower, and if patient could be discharged from their point of view.

## 2021-06-13 ENCOUNTER — APPOINTMENT (OUTPATIENT)
Dept: GENERAL RADIOLOGY | Age: 66
DRG: 190 | End: 2021-06-13
Payer: MEDICARE

## 2021-06-13 LAB
ANION GAP SERPL CALCULATED.3IONS-SCNC: 6 MMOL/L (ref 7–16)
BASOPHILS ABSOLUTE: 0.01 E9/L (ref 0–0.2)
BASOPHILS RELATIVE PERCENT: 0.1 % (ref 0–2)
BUN BLDV-MCNC: 19 MG/DL (ref 6–23)
CALCIUM SERPL-MCNC: 9.6 MG/DL (ref 8.6–10.2)
CHLORIDE BLD-SCNC: 94 MMOL/L (ref 98–107)
CO2: 41 MMOL/L (ref 22–29)
CREAT SERPL-MCNC: 1.1 MG/DL (ref 0.5–1)
CULTURE, RESPIRATORY: NORMAL
EOSINOPHILS ABSOLUTE: 0.03 E9/L (ref 0.05–0.5)
EOSINOPHILS RELATIVE PERCENT: 0.4 % (ref 0–6)
GFR AFRICAN AMERICAN: >60
GFR NON-AFRICAN AMERICAN: >60 ML/MIN/1.73
GLUCOSE BLD-MCNC: 124 MG/DL (ref 74–99)
HCT VFR BLD CALC: 41.2 % (ref 34–48)
HEMOGLOBIN: 12.4 G/DL (ref 11.5–15.5)
IMMATURE GRANULOCYTES #: 0.03 E9/L
IMMATURE GRANULOCYTES %: 0.4 % (ref 0–5)
LYMPHOCYTES ABSOLUTE: 1.43 E9/L (ref 1.5–4)
LYMPHOCYTES RELATIVE PERCENT: 19.3 % (ref 20–42)
MCH RBC QN AUTO: 29.9 PG (ref 26–35)
MCHC RBC AUTO-ENTMCNC: 30.1 % (ref 32–34.5)
MCV RBC AUTO: 99.3 FL (ref 80–99.9)
METER GLUCOSE: 121 MG/DL (ref 74–99)
METER GLUCOSE: 191 MG/DL (ref 74–99)
METER GLUCOSE: 356 MG/DL (ref 74–99)
METER GLUCOSE: 361 MG/DL (ref 74–99)
MONOCYTES ABSOLUTE: 0.48 E9/L (ref 0.1–0.95)
MONOCYTES RELATIVE PERCENT: 6.5 % (ref 2–12)
NEUTROPHILS ABSOLUTE: 5.44 E9/L (ref 1.8–7.3)
NEUTROPHILS RELATIVE PERCENT: 73.3 % (ref 43–80)
PDW BLD-RTO: 14.1 FL (ref 11.5–15)
PLATELET # BLD: 281 E9/L (ref 130–450)
PMV BLD AUTO: 10.3 FL (ref 7–12)
POTASSIUM REFLEX MAGNESIUM: 3.9 MMOL/L (ref 3.5–5)
RBC # BLD: 4.15 E12/L (ref 3.5–5.5)
SMEAR, RESPIRATORY: NORMAL
SODIUM BLD-SCNC: 141 MMOL/L (ref 132–146)
WBC # BLD: 7.4 E9/L (ref 4.5–11.5)

## 2021-06-13 PROCEDURE — 36415 COLL VENOUS BLD VENIPUNCTURE: CPT

## 2021-06-13 PROCEDURE — 82962 GLUCOSE BLOOD TEST: CPT

## 2021-06-13 PROCEDURE — 6370000000 HC RX 637 (ALT 250 FOR IP): Performed by: CLINICAL NURSE SPECIALIST

## 2021-06-13 PROCEDURE — 6370000000 HC RX 637 (ALT 250 FOR IP): Performed by: INTERNAL MEDICINE

## 2021-06-13 PROCEDURE — 99233 SBSQ HOSP IP/OBS HIGH 50: CPT | Performed by: STUDENT IN AN ORGANIZED HEALTH CARE EDUCATION/TRAINING PROGRAM

## 2021-06-13 PROCEDURE — 6360000002 HC RX W HCPCS: Performed by: FAMILY MEDICINE

## 2021-06-13 PROCEDURE — 80048 BASIC METABOLIC PNL TOTAL CA: CPT

## 2021-06-13 PROCEDURE — 73560 X-RAY EXAM OF KNEE 1 OR 2: CPT

## 2021-06-13 PROCEDURE — 1200000000 HC SEMI PRIVATE

## 2021-06-13 PROCEDURE — 85025 COMPLETE CBC W/AUTO DIFF WBC: CPT

## 2021-06-13 PROCEDURE — 2700000000 HC OXYGEN THERAPY PER DAY

## 2021-06-13 PROCEDURE — 99233 SBSQ HOSP IP/OBS HIGH 50: CPT | Performed by: INTERNAL MEDICINE

## 2021-06-13 PROCEDURE — 94640 AIRWAY INHALATION TREATMENT: CPT

## 2021-06-13 PROCEDURE — 6370000000 HC RX 637 (ALT 250 FOR IP): Performed by: FAMILY MEDICINE

## 2021-06-13 PROCEDURE — 2500000003 HC RX 250 WO HCPCS: Performed by: NURSE PRACTITIONER

## 2021-06-13 PROCEDURE — 2580000003 HC RX 258: Performed by: FAMILY MEDICINE

## 2021-06-13 PROCEDURE — 6360000002 HC RX W HCPCS: Performed by: CLINICAL NURSE SPECIALIST

## 2021-06-13 PROCEDURE — 94660 CPAP INITIATION&MGMT: CPT

## 2021-06-13 PROCEDURE — 6370000000 HC RX 637 (ALT 250 FOR IP): Performed by: STUDENT IN AN ORGANIZED HEALTH CARE EDUCATION/TRAINING PROGRAM

## 2021-06-13 RX ORDER — HYDRALAZINE HYDROCHLORIDE 20 MG/ML
10 INJECTION INTRAMUSCULAR; INTRAVENOUS EVERY 6 HOURS PRN
Status: DISCONTINUED | OUTPATIENT
Start: 2021-06-13 | End: 2021-06-16 | Stop reason: HOSPADM

## 2021-06-13 RX ORDER — BACLOFEN 10 MG/1
10 TABLET ORAL 3 TIMES DAILY
Status: DISCONTINUED | OUTPATIENT
Start: 2021-06-13 | End: 2021-06-16 | Stop reason: HOSPADM

## 2021-06-13 RX ORDER — BUMETANIDE 1 MG/1
1 TABLET ORAL DAILY
Status: DISCONTINUED | OUTPATIENT
Start: 2021-06-14 | End: 2021-06-16 | Stop reason: HOSPADM

## 2021-06-13 RX ORDER — DOXYCYCLINE HYCLATE 100 MG/1
100 CAPSULE ORAL EVERY 12 HOURS SCHEDULED
Status: DISCONTINUED | OUTPATIENT
Start: 2021-06-13 | End: 2021-06-16 | Stop reason: HOSPADM

## 2021-06-13 RX ADMIN — MICONAZOLE NITRATE: 2 POWDER TOPICAL at 20:37

## 2021-06-13 RX ADMIN — HYDRALAZINE HYDROCHLORIDE 50 MG: 50 TABLET ORAL at 08:49

## 2021-06-13 RX ADMIN — GABAPENTIN 100 MG: 100 CAPSULE ORAL at 08:49

## 2021-06-13 RX ADMIN — SACUBITRIL AND VALSARTAN 1 TABLET: 24; 26 TABLET, FILM COATED ORAL at 22:35

## 2021-06-13 RX ADMIN — ARFORMOTEROL TARTRATE 15 MCG: 15 SOLUTION RESPIRATORY (INHALATION) at 19:57

## 2021-06-13 RX ADMIN — INSULIN LISPRO 5 UNITS: 100 INJECTION, SOLUTION INTRAVENOUS; SUBCUTANEOUS at 20:37

## 2021-06-13 RX ADMIN — BACLOFEN 10 MG: 10 TABLET ORAL at 20:35

## 2021-06-13 RX ADMIN — BACLOFEN 10 MG: 10 TABLET ORAL at 08:48

## 2021-06-13 RX ADMIN — MONTELUKAST SODIUM 10 MG: 10 TABLET, FILM COATED ORAL at 20:36

## 2021-06-13 RX ADMIN — IPRATROPIUM BROMIDE AND ALBUTEROL SULFATE 1 AMPULE: .5; 3 SOLUTION RESPIRATORY (INHALATION) at 16:39

## 2021-06-13 RX ADMIN — DOXYCYCLINE HYCLATE 100 MG: 100 CAPSULE ORAL at 20:36

## 2021-06-13 RX ADMIN — BUDESONIDE 500 MCG: 0.5 SUSPENSION RESPIRATORY (INHALATION) at 09:15

## 2021-06-13 RX ADMIN — DOCUSATE SODIUM 100 MG: 100 CAPSULE, LIQUID FILLED ORAL at 20:36

## 2021-06-13 RX ADMIN — DOCUSATE SODIUM 100 MG: 100 CAPSULE, LIQUID FILLED ORAL at 08:48

## 2021-06-13 RX ADMIN — GABAPENTIN 100 MG: 100 CAPSULE ORAL at 20:36

## 2021-06-13 RX ADMIN — ISOSORBIDE MONONITRATE 60 MG: 60 TABLET, EXTENDED RELEASE ORAL at 08:47

## 2021-06-13 RX ADMIN — NYSTATIN OINTMENT: 100000 OINTMENT TOPICAL at 20:37

## 2021-06-13 RX ADMIN — METOCLOPRAMIDE 5 MG: 10 TABLET ORAL at 20:36

## 2021-06-13 RX ADMIN — IPRATROPIUM BROMIDE AND ALBUTEROL SULFATE 1 AMPULE: .5; 3 SOLUTION RESPIRATORY (INHALATION) at 19:57

## 2021-06-13 RX ADMIN — BUDESONIDE 500 MCG: 0.5 SUSPENSION RESPIRATORY (INHALATION) at 19:57

## 2021-06-13 RX ADMIN — METOCLOPRAMIDE 5 MG: 10 TABLET ORAL at 08:48

## 2021-06-13 RX ADMIN — DULOXETINE HYDROCHLORIDE 60 MG: 60 CAPSULE, DELAYED RELEASE ORAL at 20:36

## 2021-06-13 RX ADMIN — IPRATROPIUM BROMIDE AND ALBUTEROL SULFATE 1 AMPULE: .5; 3 SOLUTION RESPIRATORY (INHALATION) at 13:17

## 2021-06-13 RX ADMIN — TROSPIUM CHLORIDE 20 MG: 20 TABLET, FILM COATED ORAL at 17:00

## 2021-06-13 RX ADMIN — ACETAMINOPHEN 650 MG: 325 TABLET ORAL at 17:00

## 2021-06-13 RX ADMIN — FLUTICASONE PROPIONATE 1 SPRAY: 50 SPRAY, METERED NASAL at 08:49

## 2021-06-13 RX ADMIN — MICONAZOLE NITRATE: 2 POWDER TOPICAL at 08:49

## 2021-06-13 RX ADMIN — CETIRIZINE HYDROCHLORIDE 10 MG: 10 TABLET, FILM COATED ORAL at 08:48

## 2021-06-13 RX ADMIN — INSULIN LISPRO 10 UNITS: 100 INJECTION, SOLUTION INTRAVENOUS; SUBCUTANEOUS at 17:50

## 2021-06-13 RX ADMIN — HYDRALAZINE HYDROCHLORIDE 50 MG: 50 TABLET ORAL at 23:00

## 2021-06-13 RX ADMIN — ARFORMOTEROL TARTRATE 15 MCG: 15 SOLUTION RESPIRATORY (INHALATION) at 09:15

## 2021-06-13 RX ADMIN — TRAZODONE HYDROCHLORIDE 100 MG: 50 TABLET ORAL at 20:35

## 2021-06-13 RX ADMIN — NYSTATIN OINTMENT: 100000 OINTMENT TOPICAL at 08:50

## 2021-06-13 RX ADMIN — ASPIRIN 81 MG: 81 TABLET, CHEWABLE ORAL at 08:47

## 2021-06-13 RX ADMIN — CARVEDILOL 25 MG: 25 TABLET, FILM COATED ORAL at 08:47

## 2021-06-13 RX ADMIN — HYDRALAZINE HYDROCHLORIDE 50 MG: 50 TABLET ORAL at 00:06

## 2021-06-13 RX ADMIN — TROSPIUM CHLORIDE 20 MG: 20 TABLET, FILM COATED ORAL at 06:01

## 2021-06-13 RX ADMIN — DULOXETINE HYDROCHLORIDE 60 MG: 60 CAPSULE, DELAYED RELEASE ORAL at 08:49

## 2021-06-13 RX ADMIN — GUAIFENESIN 400 MG: 400 TABLET, FILM COATED ORAL at 09:02

## 2021-06-13 RX ADMIN — Medication 10 ML: at 10:04

## 2021-06-13 RX ADMIN — BACLOFEN 10 MG: 10 TABLET ORAL at 14:49

## 2021-06-13 RX ADMIN — PREDNISONE 40 MG: 20 TABLET ORAL at 08:48

## 2021-06-13 RX ADMIN — HYDRALAZINE HYDROCHLORIDE 50 MG: 50 TABLET ORAL at 17:00

## 2021-06-13 RX ADMIN — BUMETANIDE 1 MG: 0.25 INJECTION INTRAMUSCULAR; INTRAVENOUS at 06:03

## 2021-06-13 RX ADMIN — CARVEDILOL 25 MG: 25 TABLET, FILM COATED ORAL at 17:00

## 2021-06-13 RX ADMIN — METOCLOPRAMIDE 5 MG: 10 TABLET ORAL at 14:49

## 2021-06-13 RX ADMIN — Medication 10 ML: at 20:37

## 2021-06-13 RX ADMIN — INSULIN LISPRO 2 UNITS: 100 INJECTION, SOLUTION INTRAVENOUS; SUBCUTANEOUS at 06:30

## 2021-06-13 RX ADMIN — IPRATROPIUM BROMIDE AND ALBUTEROL SULFATE 1 AMPULE: .5; 3 SOLUTION RESPIRATORY (INHALATION) at 09:15

## 2021-06-13 RX ADMIN — ENOXAPARIN SODIUM 40 MG: 40 INJECTION SUBCUTANEOUS at 08:49

## 2021-06-13 ASSESSMENT — PAIN SCALES - GENERAL
PAINLEVEL_OUTOF10: 0
PAINLEVEL_OUTOF10: 2

## 2021-06-13 NOTE — FLOWSHEET NOTE
Pulse ox was _93_____% on room air at rest.  Ambulated patient on room air. Oxygen saturation was __84____% on room air while ambulating. Oxygen applied. Recovery pulse ox was __96____% on __4_____ liters of oxygen while ambulating.

## 2021-06-13 NOTE — PROGRESS NOTES
Pulmonary Progress Note    Admit Date: 6/9/2021                            PCP: Dodie Khan DO  Active Problems:    Acute respiratory failure with hypoxia ([address]    Pneumonia of both lungs due to infectious organism  Resolved Problems:    * No resolved hospital problems. *      Subjective:  Resting in bed on 4LNC. Feels a little better.  C/o she fell prior to admission and her left leg is aching -Rn aware     Medications:   sodium chloride      dextrose          docusate sodium  100 mg Oral BID    bumetanide  1 mg Intravenous Daily    predniSONE  40 mg Oral Daily    Followed by   Marcia Perea ON 6/15/2021] predniSONE  30 mg Oral Daily    Followed by   Marcia Perea ON 6/18/2021] predniSONE  20 mg Oral Daily    Followed by   Marcia Perea ON 6/21/2021] predniSONE  10 mg Oral Daily    Arformoterol Tartrate  15 mcg Nebulization BID    budesonide  0.5 mg Nebulization BID    aspirin  81 mg Oral Daily    carvedilol  25 mg Oral BID WC    cetirizine  10 mg Oral Daily    DULoxetine  60 mg Oral BID    [Held by provider] sacubitril-valsartan  1 tablet Oral BID    fluticasone  1 spray Each Nostril Daily    gabapentin  100 mg Oral BID    hydrALAZINE  50 mg Oral Q8H    sodium chloride flush  5-40 mL Intravenous 2 times per day    enoxaparin  40 mg Subcutaneous Daily    insulin lispro  0-12 Units Subcutaneous TID WC    insulin lispro  0-6 Units Subcutaneous Nightly    isosorbide mononitrate  60 mg Oral Daily    metoclopramide  5 mg Oral TID    trospium  20 mg Oral BID AC    montelukast  10 mg Oral Nightly    [Held by provider] spironolactone  25 mg Oral Daily    traZODone  100 mg Oral Nightly    simvastatin  20 mg Oral Nightly    nystatin   Topical BID    miconazole   Topical BID    ipratropium-albuterol  1 ampule Inhalation Q4H WA       Vitals:  VITALS:  BP (!) 198/85   Pulse 77   Temp 98.6 °F (37 °C) (Oral)   Resp 18   Ht 5' 2\" (1.575 m)   Wt 229 lb (103.9 kg)   LMP 01/01/1990   SpO2 93%   BMI 41.88 kg/m²   24HR INTAKE/OUTPUT:      Intake/Output Summary (Last 24 hours) at 2021 0917  Last data filed at 2021  Gross per 24 hour   Intake 420 ml   Output 1100 ml   Net -680 ml     CURRENT PULSE OXIMETRY:  SpO2: 93 %  24HR PULSE OXIMETRY RANGE:  SpO2  Av.2 %  Min: 80 %  Max: 98 %  CVP:    VENT SETTINGS:   Vent Information  Skin Assessment: Clean, dry, & intact  SpO2: 93 %  Mask Type: Full face mask  Mask Size: Small  Additional Respiratory  Assessments  Pulse: 77  Resp: 18  SpO2: 93 %      EXAM:  General: Alert, in NAD  ENT: No discharge. Pharynx clear. membranes moist   Neck: Supple, Trachea midline. Crowded airway   Resp: No accessory muscle use. Non-labored. Lungs coarse throughout   CV: Regular rate. Regular rhythm. No murmur . No edema. ABD: + tender. Non-distended. Soft, round . Normal bowel sounds. Skin: Warm and dry. M/S: No cyanosis. No joint deformity. No clubbing. Neuro: Awake. Follows commands. O x 3, PEDROZA  Psych:  calm and interactive     I/O: I/O last 3 completed shifts: In: 540 [P.O.:540]  Out: 1100 [Urine:1100]  No intake/output data recorded. Results:  CBC:   Recent Labs     21  0545 21  1445   WBC 9.1 8.6   HGB 10.7* 11.8   HCT 35.8 39.2   .3* 101.6*    267     BMP:   Recent Labs     21  0545 21  1150 21  1445    138 138   K 6.0* 5.8* 4.9    99 96*   CO2 33* 34* 38*   BUN 23 23 21   CREATININE 1.1* 1.0 1.1*     LFT:   No results for input(s): ALKPHOS, ALT, AST, PROT, BILITOT, BILIDIR, LABALBU in the last 72 hours. PT/INR: No results for input(s): PROTIME, INR in the last 72 hours. Procalcitonin:   No results for input(s): PROCAL in the last 72 hours. Cultures:  Results for Steff Peraza (MRN [id number]) as of 2021 13:38   Ref.  Range 2021 21:00   Influenza A by PCR Latest Ref Range: Not Detected  Not Detected   Influenza B by PCR Latest Ref Range: Not Detected  Not Detected   Adenovirus by PCR Latest Ref Range: Not Detected  Not Detected   Coronavirus 229E by PCR Latest Ref Range: Not Detected  Not Detected   Coronavirus HKU1 by PCR Latest Ref Range: Not Detected  Not Detected   Coronavirus NL63 by PCR Latest Ref Range: Not Detected  Not Detected   Coronavirus OC43 by PCR Latest Ref Range: Not Detected  Not Detected   Human Metapneumovirus by PCR Latest Ref Range: Not Detected  Not Detected   Human Rhinovirus/Enterovirus by PCR Latest Ref Range: Not Detected  Not Detected   Parainfluenza Virus 1 by PCR Latest Ref Range: Not Detected  Not Detected   Parainfluenza Virus 2 by PCR Latest Ref Range: Not Detected  Not Detected   Parainfluenza Virus 3 by PCR Latest Ref Range: Not Detected  Not Detected   Parainfluenza Virus 4 by PCR Latest Ref Range: Not Detected  Not Detected   Respiratory Syncytial Virus by PCR Latest Ref Range: Not Detected  Not Detected   Bordetella parapertussis by PCR Latest Ref Range: Not Detected  Not Detected   Chlamydophilia pneumoniae by PCR Latest Ref Range: Not Detected  Not Detected   Mycoplasma pneumoniae by PCR Latest Ref Range: Not Detected  Not Detected   SARS-CoV-2, PCR Latest Ref Range: Not Detected  Not Detected   Bordetella pertussis by PCR Latest Ref Range: Not Detected  Not Detected         ABG:   No results for input(s): PH, PO2, PCO2, HCO3, BE, O2SAT in the last 72 hours. Films:  XR CHEST PORTABLE    Result Date: 6/9/2021  EXAMINATION: ONE XRAY VIEW OF THE CHEST 6/9/2021 2:53 pm COMPARISON: 19 April 2021 HISTORY: ORDERING SYSTEM PROVIDED HISTORY: hypoxia TECHNOLOGIST PROVIDED HISTORY: Reason for exam:->hypoxia FINDINGS: Prior median sternotomy. Heart is enlarged. Pulmonary vascularity appears mildly congested. Airspace disease on the left appears diminished. Airspace disease on the right may be minimally progressive. Neither costophrenic angle is blunted. Airspace disease with interval improvement on the left and probable slight progression on the right. Whether this relates to changing pneumonia or superimposed pulmonary edema is less than certain. CTA PULMONARY W CONTRAST    Result Date: 6/9/2021  EXAMINATION: CTA OF THE CHEST 6/9/2021 5:42 pm TECHNIQUE: CTA of the chest was performed after the administration of intravenous contrast.  Multiplanar reformatted images are provided for review. MIP images are provided for review. Dose modulation, iterative reconstruction, and/or weight based adjustment of the mA/kV was utilized to reduce the radiation dose to as low as reasonably achievable. COMPARISON: April 19 spine HISTORY: ORDERING SYSTEM PROVIDED HISTORY: recent covid in april, hypoxic, r/o PE TECHNOLOGIST PROVIDED HISTORY: Reason for exam:->recent covid in april, hypoxic, r/o PE Decision Support Exception - unselect if not a suspected or confirmed emergency medical condition->Emergency Medical Condition (MA) FINDINGS: Pulmonary Arteries: Pulmonary arteries are adequately opacified for evaluation. No evidence of intraluminal filling defect to suggest pulmonary embolism. Main pulmonary artery is normal in caliber. Mediastinum: Nonspecific hilar and mediastinal nodes, which may be reactive. Cardiomegaly. No pericardial effusion. Lungs/pleura: Multifocal ground-glass and interstitial airspace disease in the lungs bilaterally, less extensive than on the prior examination and most consistent with multifocal pneumonia. Small bilateral pleural effusions, slightly larger on the right. . Images degraded by respiratory motion artifact. Upper Abdomen: Limited images of the upper abdomen are unremarkable. Soft Tissues/Bones:  status post median sternotomy. No acute bony pathology. No evidence of pulmonary emboli. Multifocal ground-glass and interstitial airspace disease in the lungs bilaterally, less extensive than on the prior examination and most consistent with multifocal pneumonia. Small bilateral pleural effusions, slightly larger on the right.  Nonspecific hilar and mediastinal nodes, which may be reactive. Cardiomegaly. 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 POX dropped to 78% when o2 off last evening. Will need ambulating POX to evaluate home o2 needs and liter flow   2.)  IS  3.) PCT 0.02, afebrile , normal WBC, ABX stopped   4.)Coughing up thick yellow mucous-sent for culture   5.)continue prednisone oral taper   6.) continue flonase and zyrtec   7.)on [id number] QD  8.) on spiriva and symbicort at home-add brovana and budesonide here   9.) supportive care  10.)wears trilogy at home ,  wearing cpap  5 at hs here , back to NIV when DC  11.) Stable from a pulmonary standpoint         Electronically signed by PATRIZIA Vizcaino on 6/13/2021 at 9:17 AM      Attending Attestation Note:    Patient seen and examined with NP. I agree with above.     In addition, the following apply:    - coarse breatjh sounds  - doxycycline BID x 7 days for bronchitis   - supportive care with breathing treatments to continue     Solomon Noriega MD  6/13/2021  3:11 PM

## 2021-06-13 NOTE — PROGRESS NOTES
artery angioplasty    Uncontrolled hypertension    Hypertensive urgency    Acute respiratory disease due to 2019 novel coronavirus    Pneumonia due to COVID-19 virus    Throat swelling       Allergies   Allergen Reactions    Latex Hives    Bee Venom Anaphylaxis    Dilaudid [Hydromorphone Hcl] Itching    Dye [Iodides] Hives and Shortness Of Breath    Percocet [Oxycodone-Acetaminophen] Shortness Of Breath and Itching    Keflex [Cephalexin] Itching and Rash    Lasix [Furosemide] Other (See Comments)     Pt states she cramps up and gets headaches    Levaquin [Levofloxacin In D5w] Hives    Lipitor      MUSCLE SPASMS    Lyrica [Pregabalin]      Dream disturbances    Morphine Hives    Naproxen      Unsure of reaction;pt states able to take Aleve without difficulties    Nefazodone Other (See Comments)    Norvasc [Amlodipine] Swelling    Oxycodone-Acetaminophen Swelling    Shellfish-Derived Products     Trazodone And Nefazodone        Current Facility-Administered Medications   Medication Dose Route Frequency Provider Last Rate Last Admin    docusate sodium (COLACE) capsule 100 mg  100 mg Oral BID Evelyn Burgess MD   100 mg at 06/13/21 0848    bisacodyl (DULCOLAX) suppository 10 mg  10 mg Rectal Daily PRN Evelyn Burgess MD        bumetanide (BUMEX) injection 1 mg  1 mg Intravenous Daily Mady Mile APRN - CNP   1 mg at 06/13/21 0603    predniSONE (DELTASONE) tablet 40 mg  40 mg Oral Daily Daisy Shorts, APRN - CNS   40 mg at 06/13/21 0848    Followed by   Teresa Birch ON 6/15/2021] predniSONE (DELTASONE) tablet 30 mg  30 mg Oral Daily Daisy Shorts, APRN - CNS        Followed by   Teresa Birch ON 6/18/2021] predniSONE (DELTASONE) tablet 20 mg  20 mg Oral Daily Daisy Shorts, APRN - CNS        Followed by   Teresa Birch ON 6/21/2021] predniSONE (DELTASONE) tablet 10 mg  10 mg Oral Daily Daisy Shorts, APRN - CNS        Arformoterol Tartrate (BROVANA) nebulizer solution 15 mcg  15 mcg Nebulization BID Daisy Shorts, APRN - CNS   15 mcg at 06/13/21 0915    budesonide (PULMICORT) nebulizer suspension 500 mcg  0.5 mg Nebulization BID Ely Batista APRN - CNS   500 mcg at 06/13/21 0915    aspirin chewable tablet 81 mg  81 mg Oral Daily Matt Olson MD   81 mg at 06/13/21 0847    baclofen (LIORESAL) tablet 10 mg  10 mg Oral BID PRN Matt Olson MD   10 mg at 06/13/21 0848    carvedilol (COREG) tablet 25 mg  25 mg Oral BID WC Matt Olson MD   25 mg at 06/13/21 0847    cetirizine (ZYRTEC) tablet 10 mg  10 mg Oral Daily Matt Olson MD   10 mg at 06/13/21 0848    DULoxetine (CYMBALTA) extended release capsule 60 mg  60 mg Oral BID Matt Olson MD   60 mg at 06/13/21 0849    [Held by provider] sacubitril-valsartan (ENTRESTO) 24-26 MG per tablet 1 tablet  1 tablet Oral BID Matt Olson MD   1 tablet at 06/11/21 1005    fluticasone (FLONASE) 50 MCG/ACT nasal spray 1 spray  1 spray Each Nostril Daily Matt Olson MD   1 spray at 06/13/21 0849    gabapentin (NEURONTIN) capsule 100 mg  100 mg Oral BID Matt Olson MD   100 mg at 06/13/21 0849    hydrALAZINE (APRESOLINE) tablet 50 mg  50 mg Oral Q8H Matt Olson MD   50 mg at 06/13/21 0849    sodium chloride flush 0.9 % injection 5-40 mL  5-40 mL Intravenous 2 times per day Matt Olson MD   10 mL at 06/13/21 1004    sodium chloride flush 0.9 % injection 5-40 mL  5-40 mL Intravenous PRN Matt Olson MD   10 mL at 06/11/21 1202    0.9 % sodium chloride infusion  25 mL Intravenous PRN Matt Olson MD        enoxaparin (LOVENOX) injection 40 mg  40 mg Subcutaneous Daily Matt Olson MD   40 mg at 06/13/21 0849    polyethylene glycol (GLYCOLAX) packet 17 g  17 g Oral Daily PRN Matt Olson MD        acetaminophen (TYLENOL) tablet 650 mg  650 mg Oral Q6H PRN Matt Olson MD   650 mg at 06/12/21 1407    Or    acetaminophen (TYLENOL) suppository 650 mg  650 mg Rectal Q6H PRN Matt Olson MD        guaiFENesin tablet 400 mg  400 mg Oral 4x Daily PRN Gabriel Benitez MD   400 mg at 06/13/21 0902    insulin lispro (HUMALOG) injection vial 0-12 Units  0-12 Units Subcutaneous TID WC Gabriel Benitez MD   2 Units at 06/13/21 0630    insulin lispro (HUMALOG) injection vial 0-6 Units  0-6 Units Subcutaneous Nightly Gabriel Benitez MD   6 Units at 06/12/21 2006    glucose (GLUTOSE) 40 % oral gel 15 g  15 g Oral PRN Gabriel Benitez MD        dextrose 50 % IV solution  12.5 g Intravenous PRN Gabriel Benitez MD        glucagon (rDNA) injection 1 mg  1 mg Intramuscular PRN Gabriel Benitez MD        dextrose 5 % solution  100 mL/hr Intravenous PRN Gabriel Benitez MD        isosorbide mononitrate (IMDUR) extended release tablet 60 mg  60 mg Oral Daily Gabriel Benitez MD   60 mg at 06/13/21 0847    metoclopramide (REGLAN) tablet 5 mg  5 mg Oral TID Gabriel Benitez MD   5 mg at 06/13/21 0848    trospium (SANCTURA) tablet 20 mg  20 mg Oral BID AC Gabriel Benitez MD   20 mg at 06/13/21 0601    montelukast (SINGULAIR) tablet 10 mg  10 mg Oral Nightly Gabriel Benitez MD   10 mg at 06/12/21 2003    [Held by provider] spironolactone (ALDACTONE) tablet 25 mg  25 mg Oral Daily Gabriel Benitez MD   25 mg at 06/11/21 1006    traZODone (DESYREL) tablet 100 mg  100 mg Oral Nightly Gabriel Benitez MD   100 mg at 06/12/21 2003    simvastatin (ZOCOR) tablet 20 mg  20 mg Oral Nightly Gabriel Benitez MD        albuterol (PROVENTIL) nebulizer solution 2.5 mg  2.5 mg Nebulization Q2H PRN Gabriel Benitez MD        nystatin (MYCOSTATIN) ointment   Topical BID Yobani Cho MD   Given at 06/13/21 0850    miconazole (MICOTIN) 2 % powder   Topical BID Yobani Cho MD   Given at 06/13/21 0849    ipratropium-albuterol (DUONEB) nebulizer solution 1 ampule  1 ampule Inhalation Q4H WA Yobani Cho MD   1 ampule at 06/13/21 0915     Review of systems:   Heart: as above   Lungs: as above   Eyes: denies changes in vision or discharge.    Ears: denies changes in hearing or pain.   Nose: denies epistaxis or masses   Throat: denies sore throat or trouble swallowing. Neuro: denies numbness, tingling, tremors. Skin: denies rashes or itching. : denies hematuria, dysuria   GI: denies vomiting, diarrhea   Psych: denies mood changed, anxiety, depression. Physical Exam   BP (!) 198/85   Pulse 77   Temp 98.6 °F (37 °C) (Oral)   Resp 18   Ht 5' 2\" (1.575 m)   Wt 229 lb (103.9 kg)   LMP 01/01/1990   SpO2 93%   BMI 41.88 kg/m²   Constitutional: Oriented to person, place, and time. No distress. Well developed. Head: Normocephalic and atraumatic. Neck: Neck supple. No hepatojugular reflux. No JVD present. Carotid bruit is not present. No tracheal deviation present. No thyromegaly present. Cardiovascular: Normal rate, regular rhythm, normal heart sounds. intact distal pulses. No gallop and no friction rub. No murmur heard. Pulmonary: Breath sounds normal. No respiratory distress. No wheezes. No rales. Chest: Effort normal. No tenderness. Abdominal: Soft. Bowel sounds are normal. No distension or mass. No tenderness, rebound or guarding. Musculoskeletal: . No tenderness. No clubbing or cyanosis. Extremitites: Intact distal pulses. No edema  Neurological: Alert and oriented to person, place, and time. Skin: Skin is warm and dry. No rash noted. Not diaphoretic. No erythema. Psychiatric: Normal mood and affect. Behavior is normal.   Lymphadenopathy: No cervical adenopathy. No groin adenopathy.       CBC:   Lab Results   Component Value Date    WBC 8.6 06/12/2021    RBC 3.86 06/12/2021    HGB 11.8 06/12/2021    HCT 39.2 06/12/2021    .6 06/12/2021    MCH 30.6 06/12/2021    MCHC 30.1 06/12/2021    RDW 14.2 06/12/2021     06/12/2021    MPV 10.0 06/12/2021     BMP:   Lab Results   Component Value Date     06/12/2021    K 4.9 06/12/2021    CL 96 06/12/2021    CO2 38 06/12/2021    BUN 21 06/12/2021    LABALBU 3.6 06/09/2021    LABALBU 5.2 12/01/2011 CREATININE 1.1 06/12/2021    CALCIUM 9.2 06/12/2021    GFRAA >60 06/12/2021    LABGLOM >60 06/12/2021     Magnesium:    Lab Results   Component Value Date    MG 2.0 04/13/2019     Cardiac Enzymes:   Lab Results   Component Value Date    CKTOTAL 88 01/05/2016    CKTOTAL 85 11/12/2015    CKTOTAL 124 04/18/2015    CKMB 1.2 01/05/2016    CKMB 2.8 11/12/2015    CKMB 1.6 04/18/2015    TROPONINI <0.01 04/19/2021    TROPONINI <0.01 03/12/2021    TROPONINI <0.01 09/19/2019      PT/INR:    Lab Results   Component Value Date    PROTIME 12.6 04/19/2021    PROTIME 12.6 04/22/2011    INR 1.1 04/19/2021     TSH:    Lab Results   Component Value Date    TSH 0.646 02/05/2021       CTA pulmonary left contrast: 6/9/2021  No evidence of pulmonary emboli.       Multifocal ground-glass and interstitial airspace disease in the lungs   bilaterally, less extensive than on the prior examination and most consistent   with multifocal pneumonia.       Small bilateral pleural effusions, slightly larger on the right.       Nonspecific hilar and mediastinal nodes, which may be reactive.       Cardiomegaly.         Chest x-ray: 6/9/2021:  Airspace disease with interval improvement on the left and probable slight   progression on the right. Whether this relates to changing pneumonia or   superimposed pulmonary edema is less than certain.         Echo Summary 6/11/2021:   Left ventricle size is normal.   Ejection fraction is visually estimated at 55-60%. No regional wall motion abnormalities seen. Severe concentric left ventricular hypertrophy. Indeterminate diastolic function. Normal right ventricular size. Right ventricle global systolic function is mildly reduced . TAPSE 15 mm. No hemodynamically significant aortic stenosis is present. Physiologic and/or trace tricuspid regurgitation. RVSP is 26 mmHg. Normal estimated PA systolic pressure. No evidence for hemodynamically significant pericardial effusion.     ASSESSMENT & PLAN:    Patient Active Problem List   Diagnosis    Morbid obesity due to excess calories (HCC)    Hyperlipidemia    DAYAN and COPD overlap syndrome (HCC)    Vitamin D insufficiency    Chronic combined systolic and diastolic heart failure ([address]    Essential hypertension    GERD (gastroesophageal reflux disease)    Major depressive disorder, recurrent episode, mild ([address]    Diabetic polyneuropathy associated with type 2 diabetes mellitus (HCC)    Chronic passive hepatic congestion    Mixed incontinence urge and stress    Chronic back pain - d/t muscle spasm    Glaucoma, open angle    Elevated CA 19-9 level    Lumbar stenosis    Spondylosis 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    Lumbar disc herniation    Asymmetric septal hypertrophy (HCC)    Non-compliance    Hiatal hernia    Melanosis coli    Coronary artery disease involving native coronary artery of native heart without angina pectoris    DM2 (diabetes mellitus, type 2) ([facility]    Cigarette smoker    Marijuana use, smoked    Ischemic cardiomyopathy    PVD (peripheral vascular disease) with claudication (HCC)    Chronic venous insufficiency    History of non-ST elevation myocardial infarction (NSTEMI)    QT prolongation    Cerebellar infarct (HCC)    COPD exacerbation (HCC)    Chronic respiratory failure with hypoxia and hypercapnia (HCC)    Acute respiratory failure with hypoxia (HCC)    Controlled type 2 diabetes mellitus without complication (HCC)    Coronavirus infection    E. coli UTI    COPD (chronic obstructive pulmonary disease) (HCC)    Pneumonia of both lungs due to infectious organism    Hyperkalemia    Uncontrolled type 2 diabetes mellitus with hyperglycemia (HCC)    Status post peripheral artery angioplasty    Uncontrolled hypertension    Hypertensive urgency    Acute respiratory disease due to 2019 novel coronavirus    Pneumonia due to COVID-19 virus    Throat swelling     1.  Acute on chronic

## 2021-06-13 NOTE — PROGRESS NOTES
[facility] Progress Note    Admitting Date and Time: 6/9/2021  2:35 PM  Admit Dx: Acute respiratory failure with hypoxia ([facility] [address] [J96.01]    Subjective:  Patient is being followed for Acute respiratory failure with hypoxia ([facility] [address] [J96.01]   Pt reports diffuse headache, diffuse muscular spasms especially in the back. Per RN: No major concerns. ROS: denies fever, chills, cp, sob, n/v, HA unless stated above.      baclofen  10 mg Oral TID    docusate sodium  100 mg Oral BID    bumetanide  1 mg Intravenous Daily    predniSONE  40 mg Oral Daily    Followed by   Stanley Vivar ON 6/15/2021] predniSONE  30 mg Oral Daily    Followed by   Stanley Vivar ON 6/18/2021] predniSONE  20 mg Oral Daily    Followed by   Stanley Cb ON 6/21/2021] predniSONE  10 mg Oral Daily    Arformoterol Tartrate  15 mcg Nebulization BID    budesonide  0.5 mg Nebulization BID    aspirin  81 mg Oral Daily    carvedilol  25 mg Oral BID WC    cetirizine  10 mg Oral Daily    DULoxetine  60 mg Oral BID    sacubitril-valsartan  1 tablet Oral BID    fluticasone  1 spray Each Nostril Daily    gabapentin  100 mg Oral BID    hydrALAZINE  50 mg Oral Q8H    sodium chloride flush  5-40 mL Intravenous 2 times per day    enoxaparin  40 mg Subcutaneous Daily    insulin lispro  0-12 Units Subcutaneous TID WC    insulin lispro  0-6 Units Subcutaneous Nightly    isosorbide mononitrate  60 mg Oral Daily    metoclopramide  5 mg Oral TID    trospium  20 mg Oral BID AC    montelukast  10 mg Oral Nightly    [Held by provider] spironolactone  25 mg Oral Daily    traZODone  100 mg Oral Nightly    simvastatin  20 mg Oral Nightly    nystatin   Topical BID    miconazole   Topical BID    ipratropium-albuterol  1 ampule Inhalation Q4H WA     hydrALAZINE, 10 mg, Q6H PRN  bisacodyl, 10 mg, Daily PRN  sodium chloride flush, 5-40 mL, PRN  sodium chloride, 25 mL, PRN  polyethylene glycol, 17 g, Daily PRN  acetaminophen, 650 mg, Q6H PRN Or  acetaminophen, 650 mg, Q6H PRN  guaiFENesin, 400 mg, 4x Daily PRN  glucose, 15 g, PRN  dextrose, 12.5 g, PRN  glucagon (rDNA), 1 mg, PRN  dextrose, 100 mL/hr, PRN  albuterol, 2.5 mg, Q2H PRN         Objective:    BP (!) 198/85   Pulse 77   Temp 98.6 °F (37 °C) (Oral)   Resp 18   Ht 5' 2\" (1.575 m)   Wt 229 lb (103.9 kg)   LMP 01/01/1990   SpO2 93%   BMI 41.88 kg/m²     General Appearance: alert and oriented to person, place and time and in no acute distress, obese  Skin: warm and dry  Head: normocephalic and atraumatic  Eyes: pupils equal, round, and reactive to light, extraocular eye movements intact, conjunctivae normal  Neck: neck supple and non tender without mass, no JVD.   Pulmonary/Chest: clear to auscultation bilaterally- no wheezes, rales or rhonchi, normal air movement, no respiratory distress  Cardiovascular: normal rate, normal S1 and S2 and no carotid bruits  Abdomen: soft, + tender, non-distended, normal bowel sounds, no masses or organomegaly  Extremities: no cyanosis, no clubbing and no edema  Neurologic: no cranial nerve deficit and speech normal        Recent Labs     06/11/21  1150 06/12/21  1445 06/13/21  0935    138 141   K 5.8* 4.9 3.9   CL 99 96* 94*   CO2 34* 38* 41*   BUN 23 21 19   CREATININE 1.0 1.1* 1.1*   GLUCOSE 186* 224* 124*   CALCIUM 9.0 9.2 9.6       Recent Labs     06/11/21  0545 06/12/21  1445 06/13/21  0935   WBC 9.1 8.6 7.4   RBC 3.50 3.86 4.15   HGB 10.7* 11.8 12.4   HCT 35.8 39.2 41.2   .3* 101.6* 99.3   MCH 30.6 30.6 29.9   MCHC 29.9* 30.1* 30.1*   RDW 14.3 14.2 14.1    267 281   MPV 10.8 10.0 10.3       Micro:  No components found for: [facility])    Radiology:   Echo Complete    Result Date: 6/11/2021  Transthoracic Echocardiography Report (TTE)  Demographics   Patient Name    [facility]         Gender            Female                  [address] Record  [id number]       Room Number       14 [facility]  Number   Account #       [de-identified]      Procedure Date    06/11/2021   Corporate ID                   Ordering                                 Physician   Accession       [id number]     Referring         Gonzales Knapp  Number                         Physician         Mike Courser DO   Date of Birth   1955     Sonographer       Isi RUIZ   Age             76 year(s)     Interpreting      [address] [place] [address]                                 Physician         Physician Cardiology                                                   Mark Grant MD                                  Any Other  Procedure Type of Study   TTE procedure:Echo Complete W/Doppler & Color Flow. Procedure Date Date: 06/11/2021 Start: 07:42 AM Study Location: Portable Technical Quality: Poor visualization due to body habitus. Indications:Congestive heart failure and Dyspnea/SOB. Patient Status: Routine Contrast Medium: Definity. Height: 62 inches Weight: 230 pounds BSA: 2.03 m^2 BMI: 42.07 kg/m^2 HR: 75 bpm BP: 140/73 mmHg  Findings   Left Ventricle  Left ventricle size is normal.  Ejection fraction is visually estimated at 55-60%. No regional wall motion abnormalities seen. Severe concentric left ventricular hypertrophy. Indeterminate diastolic function. Right Ventricle  Normal right ventricular size. Right ventricle global systolic function is mildly reduced . TAPSE 15 mm. Left Atrium  Left atrium is of normal size. Right Atrium  Normal right atrium size. Mitral Valve  Normal mitral valve structure and function. No evidence of mitral valve stenosis. Physiologic and/or trace mitral regurgitation is present. Tricuspid Valve  The tricuspid valve was not well visualized. Physiologic and/or trace tricuspid regurgitation. RVSP is 26 mmHg. Normal estimated PA systolic pressure. Aortic Valve  The aortic valve appears mildly sclerotic. Individual aortic valve leaflets are not clearly visualized. No hemodynamically significant aortic stenosis is present.   No evidence of aortic valve regurgitation. Pulmonic Valve  The pulmonic valve was not well visualized. No evidence of any pulmonic regurgitation. No evidence of pulmonic valve stenosis. Pericardial Effusion  No evidence for hemodynamically significant pericardial effusion. Pleural Effusion  No evidence of pleural effusion. Aorta  Normal aortic root and ascending aorta. Miscellaneous  Inferior Vena Cava not well visualized. Conclusions   Summary  Left ventricle size is normal.  Ejection fraction is visually estimated at 55-60%. No regional wall motion abnormalities seen. Severe concentric left ventricular hypertrophy. Indeterminate diastolic function. Normal right ventricular size. Right ventricle global systolic function is mildly reduced . TAPSE 15 mm. No hemodynamically significant aortic stenosis is present. Physiologic and/or trace tricuspid regurgitation. RVSP is 26 mmHg. Normal estimated PA systolic pressure. No evidence for hemodynamically significant pericardial effusion.    Signature   ----------------------------------------------------------------  Electronically signed by Khushi Reynolds MD(Interpreting  physician) on 06/11/2021 07:42 PM  ----------------------------------------------------------------  M-Mode/2D Measurements & Calculations   LV Diastolic    LV Systolic Dimension: 3.7   AV Cusp Separation: 1 cmLA  Dimension: 5.2  cm                           Dimension: 5.3 cmAO Root  cm              LV Volume Diastolic: 183.9   Dimension: 2.2 cm  LV FS:28.9 %    ml  LV PW           LV Volume Systolic: 04.4 ml  Diastolic: 1.3  LV EDV/LV EDV Index: 131.9  cm              ml/65 ml/m^2LV ESV/LV ESV    RV Diastolic Dimension: 3.1  LV PW Systolic: Index: 31.6 VZ/10VX/ m^2     cm  1.5 cm          EF Calculated: 55.1 %  Septum          LV Mass Index: 137 l/min*m^2 LA/Aorta: 9.84  Diastolic: 1.3                               Ascending Aorta: 3.2 cm  cm                                           LA volume/Index: 62 ml  Septum          LVOT: 2 cm                   /89WL/I^3  Systolic: 1.5                                RA Area: 16.9 cm^2  cm  CO: 6.05 l/min                               IVC Expiration: 1.8 cm  CI: 2.98  l/m*m^2  LV Mass: 278.44  g  Doppler Measurements & Calculations   MV Peak E-Wave: 1.14 AV Peak Velocity:     LVOT Peak Velocity: 1.14 m/s  m/s                  1.55 m/s              LVOT Mean Velocity: 0.93 m/s  MV Peak A-Wave: 1.04 AV Peak Gradient:     LVOT Peak Gradient: 5.2  m/s                  9.62 mmHg             mmHgLVOT Mean Gradient: 3.6  MV E/A Ratio: 1.1    AV Mean Velocity:     mmHg  MV Peak Gradient:    1.07 m/s              Estimated RVSP: 26 mmHg  9.7 mmHg             AV Mean Gradient: 5.1 Estimated RAP:8 mmHg  MV Mean Gradient: 5  mmHg  mmHg                 AV VTI: 32.6 cm  MV Mean Velocity:    AV Area               TR Velocity:2.14 m/s  1.08 m/s             (Continuity):2.48     TR Gradient:18.32 mmHg  MV Deceleration      cm^2                  PV Peak Velocity: 0.8 m/s  Time: 200.6 msec                           PV Peak Gradient: 2.59 mmHg  MV P1/2t: 58.8 msec  LVOT VTI: 25.7 cm     PV Mean Velocity: 0.55 m/s  MVA by PHT:3.74 cm^2 IVRT: 106.1 msec      PV Mean Gradient: 1.4 mmHg  MV Area              Estimated PASP: 26.32  (continuity): 1.9    mmHg  cm^2  MV E' Septal  Velocity: 0.06 m/s  MV E' Lateral  Velocity: 6 m/s  [email or web address] [id number]%3d%3d      Assessment:    Active Problems:    Acute respiratory failure with hypoxia (HCC)    Pneumonia of both lungs due to infectious organism  Resolved Problems:    * No resolved hospital problems. *      Plan:    1.   Acute on chronic hypoxic and hypercapnic respiratory failure likely secondary to exacerbation of COPD -patient currently on 4 L which is her baseline at home (uses Trelegy, CPAP 5 nightly at home), on steroids,

## 2021-06-14 LAB
ANION GAP SERPL CALCULATED.3IONS-SCNC: 6 MMOL/L (ref 7–16)
BASOPHILS ABSOLUTE: 0.01 E9/L (ref 0–0.2)
BASOPHILS RELATIVE PERCENT: 0.1 % (ref 0–2)
BUN BLDV-MCNC: 22 MG/DL (ref 6–23)
CALCIUM SERPL-MCNC: 9.6 MG/DL (ref 8.6–10.2)
CHLORIDE BLD-SCNC: 97 MMOL/L (ref 98–107)
CO2: 37 MMOL/L (ref 22–29)
CREAT SERPL-MCNC: 1 MG/DL (ref 0.5–1)
EOSINOPHILS ABSOLUTE: 0.04 E9/L (ref 0.05–0.5)
EOSINOPHILS RELATIVE PERCENT: 0.4 % (ref 0–6)
GFR AFRICAN AMERICAN: >60
GFR NON-AFRICAN AMERICAN: >60 ML/MIN/1.73
GLUCOSE BLD-MCNC: 143 MG/DL (ref 74–99)
HCT VFR BLD CALC: 45.2 % (ref 34–48)
HEMOGLOBIN: 13.6 G/DL (ref 11.5–15.5)
IMMATURE GRANULOCYTES #: 0.03 E9/L
IMMATURE GRANULOCYTES %: 0.3 % (ref 0–5)
LYMPHOCYTES ABSOLUTE: 1.39 E9/L (ref 1.5–4)
LYMPHOCYTES RELATIVE PERCENT: 14.3 % (ref 20–42)
MCH RBC QN AUTO: 29.6 PG (ref 26–35)
MCHC RBC AUTO-ENTMCNC: 30.1 % (ref 32–34.5)
MCV RBC AUTO: 98.5 FL (ref 80–99.9)
METER GLUCOSE: 205 MG/DL (ref 74–99)
METER GLUCOSE: 219 MG/DL (ref 74–99)
METER GLUCOSE: 285 MG/DL (ref 74–99)
METER GLUCOSE: 346 MG/DL (ref 74–99)
MONOCYTES ABSOLUTE: 0.58 E9/L (ref 0.1–0.95)
MONOCYTES RELATIVE PERCENT: 5.9 % (ref 2–12)
NEUTROPHILS ABSOLUTE: 7.7 E9/L (ref 1.8–7.3)
NEUTROPHILS RELATIVE PERCENT: 79 % (ref 43–80)
PDW BLD-RTO: 13.9 FL (ref 11.5–15)
PLATELET # BLD: 312 E9/L (ref 130–450)
PMV BLD AUTO: 10.1 FL (ref 7–12)
POTASSIUM REFLEX MAGNESIUM: 4.7 MMOL/L (ref 3.5–5)
RBC # BLD: 4.59 E12/L (ref 3.5–5.5)
SODIUM BLD-SCNC: 140 MMOL/L (ref 132–146)
WBC # BLD: 9.8 E9/L (ref 4.5–11.5)

## 2021-06-14 PROCEDURE — 94640 AIRWAY INHALATION TREATMENT: CPT

## 2021-06-14 PROCEDURE — 2700000000 HC OXYGEN THERAPY PER DAY

## 2021-06-14 PROCEDURE — 82962 GLUCOSE BLOOD TEST: CPT

## 2021-06-14 PROCEDURE — 6360000002 HC RX W HCPCS: Performed by: FAMILY MEDICINE

## 2021-06-14 PROCEDURE — 85025 COMPLETE CBC W/AUTO DIFF WBC: CPT

## 2021-06-14 PROCEDURE — 99233 SBSQ HOSP IP/OBS HIGH 50: CPT | Performed by: INTERNAL MEDICINE

## 2021-06-14 PROCEDURE — 6370000000 HC RX 637 (ALT 250 FOR IP): Performed by: INTERNAL MEDICINE

## 2021-06-14 PROCEDURE — 6360000002 HC RX W HCPCS: Performed by: CLINICAL NURSE SPECIALIST

## 2021-06-14 PROCEDURE — 6370000000 HC RX 637 (ALT 250 FOR IP): Performed by: FAMILY MEDICINE

## 2021-06-14 PROCEDURE — 36415 COLL VENOUS BLD VENIPUNCTURE: CPT

## 2021-06-14 PROCEDURE — 1200000000 HC SEMI PRIVATE

## 2021-06-14 PROCEDURE — 94660 CPAP INITIATION&MGMT: CPT

## 2021-06-14 PROCEDURE — 6360000002 HC RX W HCPCS: Performed by: STUDENT IN AN ORGANIZED HEALTH CARE EDUCATION/TRAINING PROGRAM

## 2021-06-14 PROCEDURE — 80048 BASIC METABOLIC PNL TOTAL CA: CPT

## 2021-06-14 PROCEDURE — 2580000003 HC RX 258: Performed by: FAMILY MEDICINE

## 2021-06-14 PROCEDURE — 6370000000 HC RX 637 (ALT 250 FOR IP): Performed by: CLINICAL NURSE SPECIALIST

## 2021-06-14 PROCEDURE — 99233 SBSQ HOSP IP/OBS HIGH 50: CPT | Performed by: STUDENT IN AN ORGANIZED HEALTH CARE EDUCATION/TRAINING PROGRAM

## 2021-06-14 PROCEDURE — 6370000000 HC RX 637 (ALT 250 FOR IP): Performed by: STUDENT IN AN ORGANIZED HEALTH CARE EDUCATION/TRAINING PROGRAM

## 2021-06-14 RX ADMIN — BUMETANIDE 1 MG: 1 TABLET ORAL at 08:13

## 2021-06-14 RX ADMIN — INSULIN LISPRO 4 UNITS: 100 INJECTION, SOLUTION INTRAVENOUS; SUBCUTANEOUS at 10:44

## 2021-06-14 RX ADMIN — FLUTICASONE PROPIONATE 1 SPRAY: 50 SPRAY, METERED NASAL at 08:15

## 2021-06-14 RX ADMIN — DOXYCYCLINE HYCLATE 100 MG: 100 CAPSULE ORAL at 21:38

## 2021-06-14 RX ADMIN — ACETAMINOPHEN 650 MG: 325 TABLET ORAL at 08:13

## 2021-06-14 RX ADMIN — BUDESONIDE 500 MCG: 0.5 SUSPENSION RESPIRATORY (INHALATION) at 20:37

## 2021-06-14 RX ADMIN — BACLOFEN 10 MG: 10 TABLET ORAL at 08:14

## 2021-06-14 RX ADMIN — ASPIRIN 81 MG: 81 TABLET, CHEWABLE ORAL at 08:15

## 2021-06-14 RX ADMIN — MICONAZOLE NITRATE: 2 POWDER TOPICAL at 21:39

## 2021-06-14 RX ADMIN — DOCUSATE SODIUM 100 MG: 100 CAPSULE, LIQUID FILLED ORAL at 08:16

## 2021-06-14 RX ADMIN — BACLOFEN 10 MG: 10 TABLET ORAL at 21:39

## 2021-06-14 RX ADMIN — TROSPIUM CHLORIDE 20 MG: 20 TABLET, FILM COATED ORAL at 08:14

## 2021-06-14 RX ADMIN — TRAZODONE HYDROCHLORIDE 100 MG: 50 TABLET ORAL at 21:38

## 2021-06-14 RX ADMIN — ARFORMOTEROL TARTRATE 15 MCG: 15 SOLUTION RESPIRATORY (INHALATION) at 08:47

## 2021-06-14 RX ADMIN — INSULIN LISPRO 3 UNITS: 100 INJECTION, SOLUTION INTRAVENOUS; SUBCUTANEOUS at 21:39

## 2021-06-14 RX ADMIN — DOCUSATE SODIUM 100 MG: 100 CAPSULE, LIQUID FILLED ORAL at 21:39

## 2021-06-14 RX ADMIN — BUDESONIDE 500 MCG: 0.5 SUSPENSION RESPIRATORY (INHALATION) at 08:47

## 2021-06-14 RX ADMIN — METOCLOPRAMIDE 5 MG: 10 TABLET ORAL at 14:06

## 2021-06-14 RX ADMIN — Medication 10 ML: at 21:38

## 2021-06-14 RX ADMIN — CARVEDILOL 25 MG: 25 TABLET, FILM COATED ORAL at 16:14

## 2021-06-14 RX ADMIN — SACUBITRIL AND VALSARTAN 1 TABLET: 24; 26 TABLET, FILM COATED ORAL at 21:38

## 2021-06-14 RX ADMIN — GABAPENTIN 100 MG: 100 CAPSULE ORAL at 21:39

## 2021-06-14 RX ADMIN — BACLOFEN 10 MG: 10 TABLET ORAL at 14:06

## 2021-06-14 RX ADMIN — ENOXAPARIN SODIUM 40 MG: 40 INJECTION SUBCUTANEOUS at 08:16

## 2021-06-14 RX ADMIN — HYDRALAZINE HYDROCHLORIDE 50 MG: 50 TABLET ORAL at 08:14

## 2021-06-14 RX ADMIN — CETIRIZINE HYDROCHLORIDE 10 MG: 10 TABLET, FILM COATED ORAL at 08:15

## 2021-06-14 RX ADMIN — ISOSORBIDE MONONITRATE 60 MG: 60 TABLET, EXTENDED RELEASE ORAL at 08:14

## 2021-06-14 RX ADMIN — ARFORMOTEROL TARTRATE 15 MCG: 15 SOLUTION RESPIRATORY (INHALATION) at 20:37

## 2021-06-14 RX ADMIN — SACUBITRIL AND VALSARTAN 1 TABLET: 24; 26 TABLET, FILM COATED ORAL at 08:13

## 2021-06-14 RX ADMIN — GABAPENTIN 100 MG: 100 CAPSULE ORAL at 08:15

## 2021-06-14 RX ADMIN — MICONAZOLE NITRATE: 2 POWDER TOPICAL at 08:15

## 2021-06-14 RX ADMIN — ACETAMINOPHEN 650 MG: 325 TABLET ORAL at 14:24

## 2021-06-14 RX ADMIN — TROSPIUM CHLORIDE 20 MG: 20 TABLET, FILM COATED ORAL at 16:14

## 2021-06-14 RX ADMIN — IPRATROPIUM BROMIDE AND ALBUTEROL SULFATE 1 AMPULE: .5; 3 SOLUTION RESPIRATORY (INHALATION) at 20:37

## 2021-06-14 RX ADMIN — IPRATROPIUM BROMIDE AND ALBUTEROL SULFATE 1 AMPULE: .5; 3 SOLUTION RESPIRATORY (INHALATION) at 08:46

## 2021-06-14 RX ADMIN — POLYETHYLENE GLYCOL 3350 17 G: 17 POWDER, FOR SOLUTION ORAL at 10:34

## 2021-06-14 RX ADMIN — PREDNISONE 40 MG: 20 TABLET ORAL at 08:14

## 2021-06-14 RX ADMIN — MONTELUKAST SODIUM 10 MG: 10 TABLET, FILM COATED ORAL at 21:39

## 2021-06-14 RX ADMIN — HYDRALAZINE HYDROCHLORIDE 50 MG: 50 TABLET ORAL at 16:14

## 2021-06-14 RX ADMIN — DULOXETINE HYDROCHLORIDE 60 MG: 60 CAPSULE, DELAYED RELEASE ORAL at 08:14

## 2021-06-14 RX ADMIN — HYDRALAZINE HYDROCHLORIDE 10 MG: 20 INJECTION INTRAMUSCULAR; INTRAVENOUS at 10:34

## 2021-06-14 RX ADMIN — METOCLOPRAMIDE 5 MG: 10 TABLET ORAL at 08:14

## 2021-06-14 RX ADMIN — SPIRONOLACTONE 25 MG: 25 TABLET ORAL at 10:34

## 2021-06-14 RX ADMIN — IPRATROPIUM BROMIDE AND ALBUTEROL SULFATE 1 AMPULE: .5; 3 SOLUTION RESPIRATORY (INHALATION) at 16:57

## 2021-06-14 RX ADMIN — INSULIN LISPRO 8 UNITS: 100 INJECTION, SOLUTION INTRAVENOUS; SUBCUTANEOUS at 16:15

## 2021-06-14 RX ADMIN — HYDRALAZINE HYDROCHLORIDE 10 MG: 20 INJECTION INTRAMUSCULAR; INTRAVENOUS at 21:52

## 2021-06-14 RX ADMIN — CARVEDILOL 25 MG: 25 TABLET, FILM COATED ORAL at 08:14

## 2021-06-14 RX ADMIN — METOCLOPRAMIDE 5 MG: 10 TABLET ORAL at 21:38

## 2021-06-14 RX ADMIN — IPRATROPIUM BROMIDE AND ALBUTEROL SULFATE 1 AMPULE: .5; 3 SOLUTION RESPIRATORY (INHALATION) at 13:00

## 2021-06-14 RX ADMIN — DULOXETINE HYDROCHLORIDE 60 MG: 60 CAPSULE, DELAYED RELEASE ORAL at 21:39

## 2021-06-14 RX ADMIN — Medication 10 ML: at 08:16

## 2021-06-14 RX ADMIN — DOXYCYCLINE HYCLATE 100 MG: 100 CAPSULE ORAL at 08:15

## 2021-06-14 RX ADMIN — INSULIN LISPRO 4 UNITS: 100 INJECTION, SOLUTION INTRAVENOUS; SUBCUTANEOUS at 07:00

## 2021-06-14 ASSESSMENT — PAIN SCALES - GENERAL
PAINLEVEL_OUTOF10: 10
PAINLEVEL_OUTOF10: 4
PAINLEVEL_OUTOF10: 8
PAINLEVEL_OUTOF10: 2
PAINLEVEL_OUTOF10: 5

## 2021-06-14 ASSESSMENT — PAIN DESCRIPTION - LOCATION
LOCATION: HEAD
LOCATION: BACK
LOCATION: HEAD

## 2021-06-14 ASSESSMENT — PAIN DESCRIPTION - PROGRESSION
CLINICAL_PROGRESSION: GRADUALLY WORSENING

## 2021-06-14 ASSESSMENT — PAIN DESCRIPTION - FREQUENCY
FREQUENCY: INTERMITTENT
FREQUENCY: INTERMITTENT

## 2021-06-14 ASSESSMENT — PAIN DESCRIPTION - DESCRIPTORS
DESCRIPTORS: HEADACHE
DESCRIPTORS: CONSTANT;DISCOMFORT
DESCRIPTORS: HEADACHE

## 2021-06-14 ASSESSMENT — PAIN DESCRIPTION - ONSET
ONSET: GRADUAL
ONSET: GRADUAL

## 2021-06-14 ASSESSMENT — PAIN DESCRIPTION - ORIENTATION
ORIENTATION: ANTERIOR
ORIENTATION: MID
ORIENTATION: ANTERIOR

## 2021-06-14 ASSESSMENT — PAIN DESCRIPTION - PAIN TYPE
TYPE: ACUTE PAIN
TYPE: ACUTE PAIN

## 2021-06-14 ASSESSMENT — PAIN - FUNCTIONAL ASSESSMENT
PAIN_FUNCTIONAL_ASSESSMENT: PREVENTS OR INTERFERES SOME ACTIVE ACTIVITIES AND ADLS
PAIN_FUNCTIONAL_ASSESSMENT: ACTIVITIES ARE NOT PREVENTED

## 2021-06-14 NOTE — PROGRESS NOTES
[facility] Progress Note    Admitting Date and Time: 6/9/2021  2:35 PM  Admit Dx: Acute respiratory failure with hypoxia ([address] [J96.01]    Subjective:  Patient is being followed for Acute respiratory failure with hypoxia ([address] [J96.01]   Pt still reports diffuse headache, diffuse muscular spasms especially in the back. Per RN: No major concerns except elevated blood pressure. ROS: denies fever, chills, cp, sob, n/v, HA unless stated above.      baclofen  10 mg Oral TID    doxycycline hyclate  100 mg Oral 2 times per day    bumetanide  1 mg Oral Daily    docusate sodium  100 mg Oral BID    [START ON 6/15/2021] predniSONE  30 mg Oral Daily    Followed by   Marcia Perea ON 6/18/2021] predniSONE  20 mg Oral Daily    Followed by   Marcia Perea ON 6/21/2021] predniSONE  10 mg Oral Daily    Arformoterol Tartrate  15 mcg Nebulization BID    budesonide  0.5 mg Nebulization BID    aspirin  81 mg Oral Daily    carvedilol  25 mg Oral BID WC    cetirizine  10 mg Oral Daily    DULoxetine  60 mg Oral BID    sacubitril-valsartan  1 tablet Oral BID    fluticasone  1 spray Each Nostril Daily    gabapentin  100 mg Oral BID    hydrALAZINE  50 mg Oral Q8H    sodium chloride flush  5-40 mL Intravenous 2 times per day    enoxaparin  40 mg Subcutaneous Daily    insulin lispro  0-12 Units Subcutaneous TID WC    insulin lispro  0-6 Units Subcutaneous Nightly    isosorbide mononitrate  60 mg Oral Daily    metoclopramide  5 mg Oral TID    trospium  20 mg Oral BID AC    montelukast  10 mg Oral Nightly    spironolactone  25 mg Oral Daily    traZODone  100 mg Oral Nightly    simvastatin  20 mg Oral Nightly    miconazole   Topical BID    ipratropium-albuterol  1 ampule Inhalation Q4H WA     hydrALAZINE, 10 mg, Q6H PRN  bisacodyl, 10 mg, Daily PRN  sodium chloride flush, 5-40 mL, PRN  sodium chloride, 25 mL, PRN  polyethylene glycol, 17 g, Daily PRN  acetaminophen, 650 mg, Q6H PRN   Or  acetaminophen, 650 mg, Q6H PRN  guaiFENesin, 400 mg, 4x Daily PRN  glucose, 15 g, PRN  dextrose, 12.5 g, PRN  glucagon (rDNA), 1 mg, PRN  dextrose, 100 mL/hr, PRN  albuterol, 2.5 mg, Q2H PRN         Objective:    BP (!) 135/55   Pulse 75   Temp 98.6 °F (37 °C) (Oral)   Resp 16   Ht 5' 2\" (1.575 m)   Wt 229 lb (103.9 kg)   LMP 01/01/1990   SpO2 94%   BMI 41.88 kg/m²     General Appearance: alert and oriented to person, place and time and in no acute distress, obese  Skin: warm and dry  Head: normocephalic and atraumatic  Eyes: pupils equal, round, and reactive to light, extraocular eye movements intact, conjunctivae normal  Neck: neck supple and non tender without mass, no JVD.   Pulmonary/Chest: clear to auscultation bilaterally- no wheezes, rales or rhonchi, normal air movement, no respiratory distress  Cardiovascular: normal rate, normal S1 and S2 and no carotid bruits  Abdomen: soft, + tender, non-distended, normal bowel sounds, no masses or organomegaly  Extremities: no cyanosis, no clubbing and no edema  Neurologic: no cranial nerve deficit and speech normal        Recent Labs     06/12/21  1445 06/13/21  0935 06/14/21  0820    141 140   K 4.9 3.9 4.7   CL 96* 94* 97*   CO2 38* 41* 37*   BUN 21 19 22   CREATININE 1.1* 1.1* 1.0   GLUCOSE 224* 124* 143*   CALCIUM 9.2 9.6 9.6       Recent Labs     06/12/21  1445 06/13/21  0935 06/14/21  0820   WBC 8.6 7.4 9.8   RBC 3.86 4.15 4.59   HGB 11.8 12.4 13.6   HCT 39.2 41.2 45.2   .6* 99.3 98.5   MCH 30.6 29.9 29.6   MCHC 30.1* 30.1* 30.1*   RDW 14.2 14.1 13.9    281 312   MPV 10.0 10.3 10.1       Micro:  No components found for: [facility])    Radiology:   Echo Complete    Result Date: 6/11/2021  Transthoracic Echocardiography Report (TTE)  Demographics   Patient Name    [facility]         Gender            Female                  [address] [place] [address]  [id number]       Room Number       0602  Number   Account #       [de-identified]      Procedure Date    06/11/2021 Corporate ID                   Ordering                                 Physician   Accession       [id number]     Referring         Orren Hamman Number                         Physician         Wendy Navas    Date of Birth   1955     Sonographer       Isi Gilbert [facility]   Age             76 year(s)     Interpreting      [address] [place] [address]                                 Physician         Physician Cardiology                                                   Mary Kim MD                                  Any Other  Procedure Type of Study   TTE procedure:Echo Complete W/Doppler & Color Flow. Procedure Date Date: 06/11/2021 Start: 07:42 AM Study Location: Portable Technical Quality: Poor visualization due to body habitus. Indications:Congestive heart failure and Dyspnea/SOB. Patient Status: Routine Contrast Medium: Definity. Height: 62 inches Weight: 230 pounds BSA: 2.03 m^2 BMI: 42.07 kg/m^2 HR: 75 bpm BP: 140/73 mmHg  Findings   Left Ventricle  Left ventricle size is normal.  Ejection fraction is visually estimated at 55-60%. No regional wall motion abnormalities seen. Severe concentric left ventricular hypertrophy. Indeterminate diastolic function. Right Ventricle  Normal right ventricular size. Right ventricle global systolic function is mildly reduced . TAPSE 15 mm. Left Atrium  Left atrium is of normal size. Right Atrium  Normal right atrium size. Mitral Valve  Normal mitral valve structure and function. No evidence of mitral valve stenosis. Physiologic and/or trace mitral regurgitation is present. Tricuspid Valve  The tricuspid valve was not well visualized. Physiologic and/or trace tricuspid regurgitation. RVSP is 26 mmHg. Normal estimated PA systolic pressure. Aortic Valve  The aortic valve appears mildly sclerotic. Individual aortic valve leaflets are not clearly visualized. No hemodynamically significant aortic stenosis is present.   No evidence of aortic valve regurgitation. Pulmonic Valve  The pulmonic valve was not well visualized. No evidence of any pulmonic regurgitation. No evidence of pulmonic valve stenosis. Pericardial Effusion  No evidence for hemodynamically significant pericardial effusion. Pleural Effusion  No evidence of pleural effusion. Aorta  Normal aortic root and ascending aorta. Miscellaneous  Inferior Vena Cava not well visualized. Conclusions   Summary  Left ventricle size is normal.  Ejection fraction is visually estimated at 55-60%. No regional wall motion abnormalities seen. Severe concentric left ventricular hypertrophy. Indeterminate diastolic function. Normal right ventricular size. Right ventricle global systolic function is mildly reduced . TAPSE 15 mm. No hemodynamically significant aortic stenosis is present. Physiologic and/or trace tricuspid regurgitation. RVSP is 26 mmHg. Normal estimated PA systolic pressure. No evidence for hemodynamically significant pericardial effusion.    Signature   ----------------------------------------------------------------  Electronically signed by Jennifer Roldan MD(Interpreting  physician) on 06/11/2021 07:42 PM  ----------------------------------------------------------------  M-Mode/2D Measurements & Calculations   LV Diastolic    LV Systolic Dimension: 3.7   AV Cusp Separation: 1 cmLA  Dimension: 5.2  cm                           Dimension: 5.3 cmAO Root  cm              LV Volume Diastolic: 979.5   Dimension: 2.2 cm  LV FS:28.9 %    ml  LV PW           LV Volume Systolic: 88.5 ml  Diastolic: 1.3  LV EDV/LV EDV Index: 131.9  cm              ml/65 ml/m^2LV ESV/LV ESV    RV Diastolic Dimension: 3.1  LV PW Systolic: Index: 12.5 PA/31CQ/ m^2     cm  1.5 cm          EF Calculated: 55.1 %  Septum          LV Mass Index: 137 l/min*m^2 LA/Aorta: 7.35  Diastolic: 1.3                               Ascending Aorta: 3.2 cm  cm                                           LA volume/Index: 62 ml Septum          LVOT: 2 cm                   /63DT/V^9  Systolic: 1.5                                RA Area: 16.9 cm^2  cm  CO: 6.05 l/min                               IVC Expiration: 1.8 cm  CI: 2.98  l/m*m^2  LV Mass: 278.44  g  Doppler Measurements & Calculations   MV Peak E-Wave: 1.14 AV Peak Velocity:     LVOT Peak Velocity: 1.14 m/s  m/s                  1.55 m/s              LVOT Mean Velocity: 0.93 m/s  MV Peak A-Wave: 1.04 AV Peak Gradient:     LVOT Peak Gradient: 5.2  m/s                  9.62 mmHg             mmHgLVOT Mean Gradient: 3.6  MV E/A Ratio: 1.1    AV Mean Velocity:     mmHg  MV Peak Gradient:    1.07 m/s              Estimated RVSP: 26 mmHg  9.7 mmHg             AV Mean Gradient: 5.1 Estimated RAP:8 mmHg  MV Mean Gradient: 5  mmHg  mmHg                 AV VTI: 32.6 cm  MV Mean Velocity:    AV Area               TR Velocity:2.14 m/s  1.08 m/s             (Continuity):2.48     TR Gradient:18.32 mmHg  MV Deceleration      cm^2                  PV Peak Velocity: 0.8 m/s  Time: 200.6 msec                           PV Peak Gradient: 2.59 mmHg  MV P1/2t: 58.8 msec  LVOT VTI: 25.7 cm     PV Mean Velocity: 0.55 m/s  MVA by PHT:3.74 cm^2 IVRT: 106.1 msec      PV Mean Gradient: 1.4 mmHg  MV Area              Estimated PASP: 26.32  (continuity): 1.9    mmHg  cm^2  MV E' Septal  Velocity: 0.06 m/s  MV E' Lateral  Velocity: 6 m/s  [email or web address] [id number]%3d%3d      Assessment:    Active Problems:    Acute respiratory failure with hypoxia (HCC)    Pneumonia of both lungs due to infectious organism  Resolved Problems:    * No resolved hospital problems. *      Plan:    1. Acute on chronic hypoxic and hypercapnic respiratory failure likely secondary to exacerbation of COPD -patient currently on 4 L which is her baseline at home (uses Trelegy, CPAP 5 nightly at home), on steroids, respiratory support.  Respiratory panel negative.  Procalcitonin less than 0.02.  Pulmonary feels has scarring/fibrosis and superimposed bronchiolitis, post Covid. Patient off antibiotics, on nebulizations. Pulmonology okay for discharge on p.o. steroids and doxy po.  2.  T2DM - Diabetic diet.  Glucose POCT.  SS insulin scale. On April 20th, 2021, hemoglobin A1c 6.5%. 6/11- HgbA1c 6.8%. Will need tighter control on steroids. 3.  Acute on chronic diastolic CHF -continue meds.  2D echo - 6/11 -ejection fraction 55 to 60% with severe concentric LVH, diastolic dysfunction, RVSP 26. On Bumex, Coreg, hydralazine, Imdur, Entresto and spironolactone, resolved today, will restart and monitor. 4.  History of COVID-19 in April 2021 -supportive care. 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 5.  Candida dermatitis - miconazole ointment and powder. 6.  Constipation with lower abdominal discomfort -added laxatives. 7.  CAD s/p CABG -on optimal medical management. 8.  Hyperlipidemia  9. Hypertensive urgency  - uncontrolled Hypertension - home meds -including Entresto and Aldactone and prn hydralazine  10. Chronic tobacco abuse-counseled to quit smoking, patient not motivated at this time. 11 Hypokalemia -resolved, Entresto and spironolactone restarted  Still holding spironolactone. 12. Left knee pain - hx of recent fall and trauma to knee. We will follow up x-ray. 13.  Back spasms - as needed baclofen  14. Metabolic alkalosis -most likely contraction from ongoing diuresis,  post -3 L diuresis, changed IV Bumex to p.o. in consultation with cardiology. DVT prophylaxis -Lovenox      NOTE: This report was transcribed using voice recognition software. Every effort was made to ensure accuracy; however, inadvertent computerized transcription errors may be present.   Electronically signed by Andrea Pearson MD on 6/14/2021 at 1:38 PM

## 2021-06-14 NOTE — PROGRESS NOTES
Date: 6/13/2021    Time: 2340      Patient Placed On BIPAP/CPAP/ Non-Invasive Ventilation? Yes    If no must comment. Facial area red/color change? No           If YES are Blister/Lesion present? No   If yes must notify nursing staff  BIPAP/CPAP skin barrier?   Yes    Skin barrier type:mepilexlite       Comments:  Red elvin Olvera RCP

## 2021-06-14 NOTE — PROGRESS NOTES
Patient reports no BM since admission. Glycolax given this morning. Will continue to monitor.     Electronically signed by Catalina Pedraza RN on 6/14/2021 at 10:43 AM

## 2021-06-14 NOTE — PROGRESS NOTES
OUTPATIENT CARDIOLOGY FOLLOW-UP    Name: Naida Narvaez    Age: 76 y.o. Primary Care Physician: Nasrin Cooper DO    Date of Service: 6/14/2021    Chief Complaint:   Chief Complaint   Patient presents with    Cough     hypoxic at [facility] office, covid in april    Shortness of Breath       Interim History:  Mrs. Randall Blank is a 29-year-old  female with history of for coronary artery disease underwent CABG in 2011 and history of severe LV dysfunction with an EF of 25% based on echocardiogram done in 2013, cardiac cath in 2013 showed no progression of disease, chronic right bundle-branch block, type II diabetes, hypertension, hyperlipidemia, morbid obesity, active tobacco use still smoking about 10 cigarettes a day, marijuana use, obstructive sleep apnea on CPAP and history of severe peripheral vascular disease came for follow-up visit. She was last seen in the office was on 9/17/2020, Since her last evaluation, she progressed from 4/19/2021 to 4/25/2021 with a COVID-19 pneumonia radiotherapy steroids and antibiotics. .  She underwent left popliteal and tibial trunk atherectomy on 11/13/2018 and was initiated on plavix and aspirin. She was discharged home on oxygen and she has been using mainly at nighttime. She denies any chest pain, increased dyspnea or leg edema. She is still smoking about 5 cigarettes a day and does not want to quit. She told me her blood pressure sometimes running high at home. She has been using wrist blood pressure machine. She was was recommended to take Coreg 12.5 mg twice daily but she has been taking 25 mg in the evening and 50 mg in the morning. Today, she is complaining of headache and blood pressure is is high and this morning it was 250/105. Currently she is also on high-dose prednisone and her medications including Entresto and spironolactone were held due to hyperkalemia. She told me her breathing definitely improved compared to the day when she came in. She quit smoking only for 3 days prior to her hospitalization. She denies any dizziness, orthopnea, paroxysmal nocturnal dyspnea or leg edema. She gets cough and congestion whenever she goes out. She has multiple allergies to grass and weeds. She denies any fever or chills. No palpitations, chest pain, syncope or presyncope. She is complaint with her medications and denies taking any over-the-counter medications. She denies using any other illicit drugs other than marijuana. She told me that she is complaint with her salt and fluid intake. SR on EKG.     Review of Systems:   Cardiac: As per HPI  General: No fever, chills  Pulmonary: As per HPI  HEENT: No visual disturbances, difficult swallowing  GI: No nausea, vomiting  Endocrine: No thyroid disease or DM  Musculoskeletal: PEDROZA x 4, no focal motor deficits  Skin: Intact, no rashes  Neuro/Psych: No headache or seizures    Past Medical History:  Past Medical History:   Diagnosis Date    Asthma     Atherosclerosis of native artery of left leg with rest pain ([address] 11/9/2018    C. difficile diarrhea 2015    CAD (coronary artery disease) 2011    Cellulitis and abscess of trunk 4/14/2015    Cerebellar infarct ([address] 4/3/2019    Remote, rt. cerebellum, head CT scan, 1/9/19    Chronic back pain     Chronic kidney disease     Chronic systolic congestive heart failure ([address] 10/4/2013    Chronic venous insufficiency 10/11/2017    COPD (chronic obstructive pulmonary disease) ([address]     Depression     Diabetes mellitus ([address]     Diabetic neuropathy ([address]     Diverticulosis     Fatty liver 1/8/2016    per US    Glaucoma, open angle 2/1/2016    Mild-OU    Hepatic encephalopathy ([address] 02/07/2016    resolved    Hiatal hernia     History of blood transfusion     Hyperlipidemia     Hyperplastic colon polyp     Hypertension     Incontinence     Liver mass     Morbid obesity ([address]     Movement disorder     Myocardial infarction ([address] 2011    Osteoarthritis, generalized     Pain in both lower legs 10/11/2017    Peripheral vascular disease ([address]     Pneumonia 1/26/2014    Pulmonary edema     resolved    PVD (peripheral vascular disease) with claudication ([address] 8/30/2017    Sleep apnea     uses BI PAP    Status post peripheral artery angioplasty 10/31/2019    Tobacco abuse     Tobacco abuse 10/11/2017    Tubular adenoma polyp of rectum     Urinary tract infection due to ESBL Klebsiella 03/08/2017    Ventral hernia        Past Surgical History:  Past Surgical History:   Procedure Laterality Date    ANGIOPLASTY  11/13/2018    Dr. Janet Ortega & athrectomy L SFA & Popliteal    BREAST SURGERY  2000    bilateral reduction    BRONCHIAL BRUSH BIOPSY  1/25/2013    Dr Ronaldo Floyd  04/20/2015    Dr. Tiago Royal  12/2011     DR. Filiberto Burk,  follows Dr Abi Liu  11/15/2013    Dr Maciej Griffin    COLONOSCOPY  12/23/2016    Dr Erica Mead adenoma & hyperplastic polyps, melanosis coli (repeat one year 12/2017)    CORONARY ARTERY BYPASS GRAFT      ECHO COMPLETE  10/9/2013         ENDOSCOPY, COLON, DIAGNOSTIC  04/18/2017    GALLBLADDER SURGERY      gallstones removed, [facility] 8/2017    HYSTERECTOMY      JOINT REPLACEMENT  2011    LEFT KNEE    KNEE SURGERY      left knee replacement    LAYER WOUND CLOSURE Left [id number]   Rodney Layer LIVER BIOPSY  1/8/2016    [facility]    POLYSOMNOGRAPHY  1/2016    [facility]    UPPER GASTROINTESTINAL ENDOSCOPY  12/20/12    UPPER GASTROINTESTINAL ENDOSCOPY  12/23/2016    Dr Matt Cuba UPPER GASTROINTESTINAL ENDOSCOPY  02/08/2017       Family History:  Family History   Problem Relation Age of Onset    Cancer Mother     Asthma Father        Social History:  Social History     Socioeconomic History    Marital status:       Spouse name: Not on file    Number of children: 2    Years of education: Not on file    Highest education level: Not on file   Occupational History    Occupation: disability   Tobacco Use    Smoking status: Current Every Day Smoker     Packs/day: 0.25     Years: 40.00     Pack years: 10.00     Types: Cigarettes     Start date: 8/10/1974    Smokeless tobacco: Never Used    Tobacco comment: 1 pack every 3 days (6-7 cig)   Vaping Use    Vaping Use: Never used   Substance and Sexual Activity    Alcohol use: Yes     Alcohol/week: 0.0 standard drinks     Comment: social    Drug use: Yes     Types: Marijuana     Comment: \"every 4th or 5th day out of the week\"    Sexual activity: Never   Other Topics Concern    Not on file   Social History Narrative    Pt lives with brother in her house-pt has never driven a car and depends on transportation     Social Determinants of Health     Financial Resource Strain:     Difficulty of Paying Living Expenses:    Food Insecurity:     Worried About Running Out of Food in the Last Year:     [address] in the Last Year:    Transportation Needs:     Lack of Transportation (Medical):  Lack of Transportation (Non-Medical):    Physical Activity:     Days of Exercise per Week:     Minutes of Exercise per Session:    Stress:     Feeling of Stress :    Social Connections:     Frequency of Communication with Friends and Family:     Frequency of Social Gatherings with Friends and Family:     Attends Judaism Services:     Active Member of Clubs or Organizations:     Attends Club or Organization Meetings:     Marital Status:    Intimate Partner Violence:     Fear of Current or Ex-Partner:     Emotionally Abused:     Physically Abused:     Sexually Abused: Allergies:   Allergies   Allergen Reactions    Latex Hives    Bee Venom Anaphylaxis    Dilaudid [Hydromorphone Hcl] Itching    Dye [Iodides] Hives and Shortness Of Breath    Percocet [Oxycodone-Acetaminophen] Shortness Of Breath and Itching    Keflex [Cephalexin] Itching and Rash    Lasix [Furosemide] Other (See Comments)     Pt states she cramps up and gets headaches    Levaquin [Levofloxacin In D5w] Hives    Lipitor      MUSCLE SPASMS    Lyrica [Pregabalin]      Dream disturbances    Morphine Hives    Naproxen      Unsure of reaction;pt states able to take Aleve without difficulties    Nefazodone Other (See Comments)    Norvasc [Amlodipine] Swelling    Oxycodone-Acetaminophen Swelling    Shellfish-Derived Products     Trazodone And Nefazodone        Current Medications:  Current Facility-Administered Medications   Medication Dose Route Frequency Provider Last Rate Last Admin    hydrALAZINE (APRESOLINE) injection 10 mg  10 mg Intravenous Q6H PRN Naresh Mendes MD        baclofen (LIORESAL) tablet 10 mg  10 mg Oral TID Naresh Mendes MD   10 mg at 06/14/21 0814    doxycycline hyclate (VIBRAMYCIN) capsule 100 mg  100 mg Oral 2 times per day Yenny Hall MD   100 mg at 06/14/21 0815    bumetanide (BUMEX) tablet 1 mg  1 mg Oral Daily Naresh Mendes MD   1 mg at 06/14/21 0813    docusate sodium (COLACE) capsule 100 mg  100 mg Oral BID Naresh Mendes MD   100 mg at 06/14/21 0816    bisacodyl (DULCOLAX) suppository 10 mg  10 mg Rectal Daily PRN MD Jen Leal [START ON 6/15/2021] predniSONE (DELTASONE) tablet 30 mg  30 mg Oral Daily Breanna Factor, APRN - CNS        Followed by   Mallissa Going ON 6/18/2021] predniSONE (DELTASONE) tablet 20 mg  20 mg Oral Daily Breanna Factor, APRN - CNS        Followed by   Mallissa Going ON 6/21/2021] predniSONE (DELTASONE) tablet 10 mg  10 mg Oral Daily Breanna Factor, APRN - CNS        Arformoterol Tartrate (BROVANA) nebulizer solution 15 mcg  15 mcg Nebulization BID Breanna Factor, APRN - CNS   15 mcg at 06/13/21 1957    budesonide (PULMICORT) nebulizer suspension 500 mcg  0.5 mg Nebulization BID Breanna Factor, APRN - CNS   500 mcg at 06/13/21 1957    aspirin chewable tablet 81 mg  81 mg Oral Daily Melissa Okeefe MD   81 mg at 06/14/21 0815    carvedilol (COREG) tablet 25 mg  25 mg Oral BID  Gabriel Benitez MD   25 mg at 06/14/21 0814    cetirizine (ZYRTEC) tablet 10 mg  10 mg Oral Daily Gabriel Benitez MD   10 mg at 06/14/21 0815    DULoxetine (CYMBALTA) extended release capsule 60 mg  60 mg Oral BID Gabriel Benitez MD   60 mg at 06/14/21 0814    sacubitril-valsartan (ENTRESTO) 24-26 MG per tablet 1 tablet  1 tablet Oral BID Gabriel Benitez MD   1 tablet at 06/14/21 0813    fluticasone (FLONASE) 50 MCG/ACT nasal spray 1 spray  1 spray Each Nostril Daily Gabriel Benitez MD   1 spray at 06/14/21 0815    gabapentin (NEURONTIN) capsule 100 mg  100 mg Oral BID Gabriel Benitez MD   100 mg at 06/14/21 0815    hydrALAZINE (APRESOLINE) tablet 50 mg  50 mg Oral Q8H Gabriel Benitez MD   50 mg at 06/14/21 0814    sodium chloride flush 0.9 % injection 5-40 mL  5-40 mL Intravenous 2 times per day Gabriel Benitez MD   10 mL at 06/14/21 0816    sodium chloride flush 0.9 % injection 5-40 mL  5-40 mL Intravenous PRN Gabriel Benitez MD   10 mL at 06/11/21 1202    0.9 % sodium chloride infusion  25 mL Intravenous PRN Gabriel Benitez MD        enoxaparin (LOVENOX) injection 40 mg  40 mg Subcutaneous Daily Gabriel Benitez MD   40 mg at 06/14/21 0816    polyethylene glycol (GLYCOLAX) packet 17 g  17 g Oral Daily PRN Gabriel Benitez MD        acetaminophen (TYLENOL) tablet 650 mg  650 mg Oral Q6H PRN Gabriel Benitez MD   650 mg at 06/14/21 0813    Or    acetaminophen (TYLENOL) suppository 650 mg  650 mg Rectal Q6H PRN Gabriel Benitez MD        guaiFENesin tablet 400 mg  400 mg Oral 4x Daily PRN Gabriel Benitez MD   400 mg at 06/13/21 0902    insulin lispro (HUMALOG) injection vial 0-12 Units  0-12 Units Subcutaneous TID  Gabriel Benitez MD   4 Units at 06/14/21 0700    insulin lispro (HUMALOG) injection vial 0-6 Units  0-6 Units Subcutaneous Nightly Gabriel Benitez MD   5 Units at 06/13/21 2037    glucose (GLUTOSE) 40 % oral gel 15 g  15 g Oral PRN Manual MD Emmanuel        dextrose 50 % IV solution 12.5 g Intravenous PRN Melissa Okeefe MD        glucagon (rDNA) injection 1 mg  1 mg Intramuscular PRN Melissa Okeefe MD        dextrose 5 % solution  100 mL/hr Intravenous PRN Melissa Okeefe MD        isosorbide mononitrate (IMDUR) extended release tablet 60 mg  60 mg Oral Daily Melissa Okeefe MD   60 mg at 06/14/21 0814    metoclopramide (REGLAN) tablet 5 mg  5 mg Oral TID Melissa Okeefe MD   5 mg at 06/14/21 0814    trospium (SANCTURA) tablet 20 mg  20 mg Oral BID AC Melissa Okeefe MD   20 mg at 06/14/21 0814    montelukast (SINGULAIR) tablet 10 mg  10 mg Oral Nightly Melissa Okeefe MD   10 mg at 06/13/21 2036    [Held by provider] spironolactone (ALDACTONE) tablet 25 mg  25 mg Oral Daily Melissa Okeefe MD   25 mg at 06/11/21 1006    traZODone (DESYREL) tablet 100 mg  100 mg Oral Nightly Melissa Okeefe MD   100 mg at 06/13/21 2035    simvastatin (ZOCOR) tablet 20 mg  20 mg Oral Nightly Melissa Okeefe MD        albuterol (PROVENTIL) nebulizer solution 2.5 mg  2.5 mg Nebulization Q2H PRN Melissa Okeefe MD        miconazole (MICOTIN) 2 % powder   Topical BID Dick Mcneill MD   Given at 06/14/21 0815    ipratropium-albuterol (DUONEB) nebulizer solution 1 ampule  1 ampule Inhalation Q4H WA Dick Mcneill MD   1 ampule at 06/13/21 1957       Physical Exam:  BP (!) 217/105   Pulse 89   Temp 98.6 °F (37 °C) (Oral)   Resp 16   Ht 5' 2\" (1.575 m)   Wt 229 lb (103.9 kg)   LMP 01/01/1990   SpO2 96%   BMI 41.88 kg/m²   Wt Readings from Last 3 Encounters:   06/13/21 229 lb (103.9 kg)   05/20/21 219 lb (99.3 kg)   05/19/21 220 lb (99.8 kg)     Appearance: Awake, alert and oriented x 3, no acute respiratory distress she is morbidly obese. Skin: Intact, no rash  Head: Normocephalic, atraumatic  Eyes: EOMI, no conjunctival erythema  ENMT: No pharyngeal erythema, MMM, no rhinorrhea  Neck: Supple, no elevated JVP, no carotid bruits  Lungs: Clear to auscultation bilaterally.  No wheezes, rales, or rhonchi.   Cardiac: Regular rate and rhythm, +S1S2, no murmurs apparent  Abdomen: Soft, nontender, +bowel sounds  Extremities: Moves all extremities x 4, no lower extremity edema  Neurologic: No focal motor deficits apparent, normal mood and affect, alert and oriented x 3  Peripheral Pulses: Intact posterior tibial pulses bilaterally    Laboratory Tests:  Lab Results   Component Value Date    CREATININE 1.1 (H) 06/13/2021    BUN 19 06/13/2021     06/13/2021    K 3.9 06/13/2021    CL 94 (L) 06/13/2021    CO2 41 (HH) 06/13/2021     Lab Results   Component Value Date    MG 2.0 04/13/2019     Lab Results   Component Value Date    WBC 7.4 06/13/2021    HGB 12.4 06/13/2021    HCT 41.2 06/13/2021    MCV 99.3 06/13/2021     06/13/2021     Lab Results   Component Value Date    ALT 17 06/09/2021    AST 11 06/09/2021    ALKPHOS 74 06/09/2021    BILITOT 0.3 06/09/2021     Lab Results   Component Value Date    CKTOTAL 88 01/05/2016    CKMB 1.2 01/05/2016    TROPONINI <0.01 04/19/2021    TROPONINI <0.01 03/12/2021    TROPONINI <0.01 09/19/2019     Lab Results   Component Value Date    INR 1.1 04/19/2021    INR 1.0 01/04/2019    INR 1.1 11/13/2018    PROTIME 12.6 (H) 04/19/2021    PROTIME 11.7 01/04/2019    PROTIME 12.3 11/13/2018     Lab Results   Component Value Date    TSH 0.646 02/05/2021     Lab Results   Component Value Date    LABA1C 6.8 (H) 06/11/2021     No results found for: EAG  Lab Results   Component Value Date    CHOL 131 02/05/2021    CHOL 162 06/16/2020    CHOL 222 (H) 06/05/2018     Lab Results   Component Value Date    TRIG 219 (H) 02/05/2021    TRIG 89 06/16/2020    TRIG 84 06/05/2018     Lab Results   Component Value Date    HDL 27 02/05/2021    HDL 46 06/16/2020    HDL 38 06/05/2018     Lab Results   Component Value Date    LDLCALC 60 02/05/2021    LDLCALC 98 06/16/2020    LDLCALC 167 (H) 06/05/2018     Lab Results   Component Value Date    LABVLDL 44 02/05/2021    LABVLDL 18 06/16/2020    LABVLDL 17 06/05/2018     No results found for: CHOLHDLRATIO    Cardiac Tests:  ECG: Normal sinus rhythm, RBBB abnormal ECG    Telemetry-normal sinus rhythm heart rate in the 80s no further episodes of VT VF over the last 24 hours. Vitals and labs were reviewed: Blood pressure 217/105 heart rate 89    Labs-BUN/creatinine 19/1.1 potassium 3.9. CBC normal.    Echocardiogram: 1/4/2017  LVEF 35-45%. LV diffuse hypokinesis,mild MR  TTE- 6/5/2018:LVEF 65%, moderate LVH, stage II diastolic dysfunction,    RXD-8/2/2001-OEYU 65%, moderate LVH, stage II diastolic dysfunction,    Stress test: 6/05/2018:  1. Pharmacologically-induced perfusion defect involving the lateral   wall, without evidence of reversibility. 2. Diffusely hypokinetic left ventricular wall motion with a focal   area of akinesia involving the mid lateral wall. 3. Left ventricular ejection fraction of 42 %       The ASCVD Risk score (Kiya Guerra, et al., 2013) failed to calculate for the following reasons: The valid systolic blood pressure range is 90 to 200 mmHg  [facility]-4/20/2015:  IMPRESSION:   1. Atherosclerotic coronary disease. a. Status post remote bypass grafting. Patent SVG to OM circumflex. b.    Stephania Mcgarry to selectively cannulate left internal mammary artery      graft, but there is no  significant disease in the LAD itself. 2.    Moderate global left ventricular systolic dysfunction. ASSESSMENT:  1. Heart failure with an improved ejection fraction with an EF of 40-45%-->65%  2. ACC/AHA, stage C., NYHA functional class II, now euvolemic. Diastolic dysfunction. Volume status is improved  3. CAD, status post CABG in 2011 LIMA to LAD, SVG to OM 1  4. Morbid obesity with obstructive sleep apnea  5. Hypertension->> hypertensive urgency secondary to combination of prednisone and also from stopping Entresto and spironolactone. 6. Hyperlipidemia on statin  7. Type II diabetes  8.  Tobacco and marijuana use, still smoking about 5 to 6 cigarettes a day. 9. PAD, S/p left popliteal and tibial atherectomy/angioplasty -stable  10. Chronic RBBB. 6.  COVID-19 pneumonia, recovered    Plan:   1. She is on guideline directed medical therapy for heart failure with reduced ejection fraction, continue Entresto 24/26 twice a day and Coreg  25 mg p.o. twice daily, hydralazine 50 mg 3 times a day and isosorbide mononitrate 60 mg daily a day. We will add spironolactone 25 mg to her regimen. Keep her at least 1 more day to make BP is stable prior to her discharge. New as needed hydralazine. 2. Continue Bumex to 1 mg po daily  3. Continue rest of the medications including hydralazine 50 mg 3 times a day . 4. She was advised again to stop smoking and told me that she does not want to quit. The patient's current medication list, allergies, problem list and results of all previously ordered testing were reviewed at today's visit.   Ankit Jurado MD  [facility]) Cardiology

## 2021-06-14 NOTE — PROGRESS NOTES
[facility] Quality Flow/Interdisciplinary Rounds Progress Note        Quality Flow Rounds held on June 14, 2021    Disciplines Attending:  Bedside Nurse, ,  and Nursing Unit Leadership    Louis Parks was admitted on 6/9/2021  2:35 PM    Anticipated Discharge Date:  Expected Discharge Date: 06/14/21    Disposition:    Jadon Score:  Jadon Scale Score: 18    Readmission Risk              Risk of Unplanned Readmission:  37           Discussed patient goal for the day, patient clinical progression, and barriers to discharge. The following Goal(s) of the Day/Commitment(s) have been identified: Bumex transitioned to oral, continue to monitor vital signs and lab values, check Cardiology and Nephrology plans, discharge planning.       Janet Hull RN  June 14, 2021

## 2021-06-14 NOTE — CARE COORDINATION
CM NOTE: Per QFR--entresto restarted. Hypertensive prior to medication this am. BP came down after medicated. On O2 4L but attempting to wean down. Pt states she didn't think she needed to be on oxygen all the time. CM on phone with [facility] from [facility] [place] who states pt has order for O2 3L continuous at home & has concentrator. States pt will need additional tanks at home. Nurse checking ambulating O2 with pt at this time.

## 2021-06-14 NOTE — PROGRESS NOTES
Pulmonary Progress Note    Admit Date: 6/9/2021                            PCP: Eloy Erazo DO  Active Problems:    Acute respiratory failure with hypoxia ([address]    Pneumonia of both lungs due to infectious organism  Resolved Problems:    * No resolved hospital problems.  *      Subjective:  Sitting up in the chair  Ready to go for a walk with RN  Currently on 4L nc - baseline at home is room air  Wearing NIV at HS - has Triliogy at home with 4L bleed in  Feels better overall, breathing better but still with cough and congestion in chest    Medications:   sodium chloride      dextrose          baclofen  10 mg Oral TID    doxycycline hyclate  100 mg Oral 2 times per day    bumetanide  1 mg Oral Daily    docusate sodium  100 mg Oral BID    [START ON 6/15/2021] predniSONE  30 mg Oral Daily    Followed by   Alexandria Leonard ON 6/18/2021] predniSONE  20 mg Oral Daily    Followed by   Alexandria Leonard ON 6/21/2021] predniSONE  10 mg Oral Daily    Arformoterol Tartrate  15 mcg Nebulization BID    budesonide  0.5 mg Nebulization BID    aspirin  81 mg Oral Daily    carvedilol  25 mg Oral BID WC    cetirizine  10 mg Oral Daily    DULoxetine  60 mg Oral BID    sacubitril-valsartan  1 tablet Oral BID    fluticasone  1 spray Each Nostril Daily    gabapentin  100 mg Oral BID    hydrALAZINE  50 mg Oral Q8H    sodium chloride flush  5-40 mL Intravenous 2 times per day    enoxaparin  40 mg Subcutaneous Daily    insulin lispro  0-12 Units Subcutaneous TID WC    insulin lispro  0-6 Units Subcutaneous Nightly    isosorbide mononitrate  60 mg Oral Daily    metoclopramide  5 mg Oral TID    trospium  20 mg Oral BID AC    montelukast  10 mg Oral Nightly    spironolactone  25 mg Oral Daily    traZODone  100 mg Oral Nightly    simvastatin  20 mg Oral Nightly    miconazole   Topical BID    ipratropium-albuterol  1 ampule Inhalation Q4H WA       Vitals:  VITALS:  BP (!) 156/94   Pulse 83   Temp 98.6 °F (37 °C) (Oral) Resp 16   Ht 5' 2\" (1.575 m)   Wt 229 lb (103.9 kg)   LMP 1990   SpO2 96%   BMI 41.88 kg/m²   24HR INTAKE/OUTPUT:      Intake/Output Summary (Last 24 hours) at 2021 1138  Last data filed at 2021 0702  Gross per 24 hour   Intake --   Output 2050 ml   Net -2050 ml     CURRENT PULSE OXIMETRY:  SpO2: 96 %  24HR PULSE OXIMETRY RANGE:  SpO2  Av.7 %  Min: 92 %  Max: 96 %  CVP:    VENT SETTINGS:   Vent Information  Skin Assessment: Clean, dry, & intact  SpO2: 96 %  Mask Type: Full face mask  Mask Size: Small  Additional Respiratory  Assessments  Pulse: 83  Resp: 16  SpO2: 96 %      EXAM:  General: Alert, in NAD  ENT: No discharge. Pharynx clear. membranes moist   Neck: Supple, Trachea midline. Crowded airway   Resp: No accessory muscle use. Non-labored. Lungs with coarse crackles bibasilar that partially clear with cough. CV: Regular rate. Regular rhythm. No murmur . Trace BLE edema. ABD: non-tender. Non-distended. Soft, round . Normal bowel sounds. Skin: Warm and dry. M/S: No cyanosis. No joint deformity. No clubbing. Neuro: Awake. Follows commands. O x 3, PEDROZA  Psych:  calm and interactive     I/O: I/O last 3 completed shifts:  In: -   Out: 1750 [YHJES:4556]  I/O this shift:  In: -   Out: 300 [Urine:300]     Results:  CBC:   Recent Labs     21  1445 21  0935 21  0820   WBC 8.6 7.4 9.8   HGB 11.8 12.4 13.6   HCT 39.2 41.2 45.2   .6* 99.3 98.5    281 312     BMP:   Recent Labs     21  1445 21  0935 21  0820    141 140   K 4.9 3.9 4.7   CL 96* 94* 97*   CO2 38* 41* 37*   BUN  22   CREATININE 1.1* 1.1* 1.0       Cultures:  Results for Jaymie Gonzales (MRN [id number]) as of 2021 13:38   Ref.  Range 2021 21:00   Influenza A by PCR Latest Ref Range: Not Detected  Not Detected   Influenza B by PCR Latest Ref Range: Not Detected  Not Detected   Adenovirus by PCR Latest Ref Range: Not Detected  Not Detected   Coronavirus 229E examination and most consistent with multifocal pneumonia. Small bilateral pleural effusions, slightly larger on the right. Nonspecific hilar and mediastinal nodes, which may be reactive. Cardiomegaly. 1. Acute on Chronic Hypoxic and hypercapnic Respiratory Failure resolved patient is on 1 L now  2. History of Covid 4/2021 post-COVID Parenchymal Scarring/Fibrosis with Superimposed Parenchymal Bronchiolitis   3. Small B/L Pleural Effusions   4. Asthma/COPD/Severe restrictive lung disease with positive methacholine challenge with mild intermittent asthma  5. DAYAN on BiPAP 27/23 daily at bedtime  6. Morbid Obesity  BMI 41.8  7. Elevated blood sugars  8. Ischemic cardiomyopathy s/p CABGt/non-ST MI 6/2018  9. Acute on chronic  diastolic dysfunction Echo 6/5/2018 EF 65% stage II DD on Entresto  10. Chronic RBBB  11. Lymphopenia   12. Chronic pain syndrome  13. Chronic constipation  14. Recent admissions  3/12/2021 with hypertensive urgency  4/20/2021 with Covid pneumonitis patient was treated with remdesivir and steroids       PLAN:  1.) Continue O2, currently on 4L nc - baseline is room air at home. Wears Triliogy at home with 4L bleed in HS. CPAP HS while here. 2.) Ambulatory pulse ox prior to d/c for home O2 needs  3.) PCT 0.02, afebrile , normal WBC. Continue Doxycycline for 7 days for bronchitis  4.) Respiratory culture negative  5.) Continue prednisone taper   6.) Continue flonase and zyrtec   7.) On lovenox 40 QD  8.) On spiriva and symbicort at home- continue brovana and budesonide here   9.) Supportive care  10.) Stable from a pulmonary standpoint       PATRIZIA Herrera CNP  6/14/2021  11:38 AM  I have personally participated in the history, exam, medical decision making with the NP on the date of service and I agree with all of the pertinent clinical information unless otherwise noted.  I have also reviewed and agree with the past medical, family, and social history unless otherwise

## 2021-06-15 LAB
ANION GAP SERPL CALCULATED.3IONS-SCNC: 10 MMOL/L (ref 7–16)
BASOPHILS ABSOLUTE: 0.01 E9/L (ref 0–0.2)
BASOPHILS RELATIVE PERCENT: 0.1 % (ref 0–2)
BILIRUBIN URINE: NEGATIVE
BLOOD CULTURE, ROUTINE: NORMAL
BLOOD, URINE: NEGATIVE
BUN BLDV-MCNC: 23 MG/DL (ref 6–23)
CALCIUM SERPL-MCNC: 9.7 MG/DL (ref 8.6–10.2)
CHLORIDE BLD-SCNC: 94 MMOL/L (ref 98–107)
CLARITY: CLEAR
CO2: 33 MMOL/L (ref 22–29)
COLOR: NORMAL
CREAT SERPL-MCNC: 1 MG/DL (ref 0.5–1)
CULTURE, BLOOD 2: NORMAL
EOSINOPHILS ABSOLUTE: 0.05 E9/L (ref 0.05–0.5)
EOSINOPHILS RELATIVE PERCENT: 0.5 % (ref 0–6)
GFR AFRICAN AMERICAN: >60
GFR NON-AFRICAN AMERICAN: >60 ML/MIN/1.73
GLUCOSE BLD-MCNC: 159 MG/DL (ref 74–99)
GLUCOSE URINE: NEGATIVE MG/DL
HCT VFR BLD CALC: 42.9 % (ref 34–48)
HEMOGLOBIN: 13.2 G/DL (ref 11.5–15.5)
IMMATURE GRANULOCYTES #: 0.05 E9/L
IMMATURE GRANULOCYTES %: 0.5 % (ref 0–5)
KETONES, URINE: NEGATIVE MG/DL
LEUKOCYTE ESTERASE, URINE: NEGATIVE
LYMPHOCYTES ABSOLUTE: 1.41 E9/L (ref 1.5–4)
LYMPHOCYTES RELATIVE PERCENT: 14 % (ref 20–42)
MCH RBC QN AUTO: 30.2 PG (ref 26–35)
MCHC RBC AUTO-ENTMCNC: 30.8 % (ref 32–34.5)
MCV RBC AUTO: 98.2 FL (ref 80–99.9)
METER GLUCOSE: 145 MG/DL (ref 74–99)
METER GLUCOSE: 194 MG/DL (ref 74–99)
METER GLUCOSE: 313 MG/DL (ref 74–99)
METER GLUCOSE: 334 MG/DL (ref 74–99)
MONOCYTES ABSOLUTE: 0.51 E9/L (ref 0.1–0.95)
MONOCYTES RELATIVE PERCENT: 5 % (ref 2–12)
NEUTROPHILS ABSOLUTE: 8.07 E9/L (ref 1.8–7.3)
NEUTROPHILS RELATIVE PERCENT: 79.9 % (ref 43–80)
NITRITE, URINE: NEGATIVE
PDW BLD-RTO: 14.3 FL (ref 11.5–15)
PH UA: 7.5 (ref 5–9)
PLATELET # BLD: 278 E9/L (ref 130–450)
PMV BLD AUTO: 10.7 FL (ref 7–12)
POTASSIUM REFLEX MAGNESIUM: 4.9 MMOL/L (ref 3.5–5)
PROTEIN UA: NEGATIVE MG/DL
RBC # BLD: 4.37 E12/L (ref 3.5–5.5)
SODIUM BLD-SCNC: 137 MMOL/L (ref 132–146)
SPECIFIC GRAVITY UA: 1.01 (ref 1–1.03)
UROBILINOGEN, URINE: 0.2 E.U./DL
WBC # BLD: 10.1 E9/L (ref 4.5–11.5)

## 2021-06-15 PROCEDURE — 6370000000 HC RX 637 (ALT 250 FOR IP): Performed by: INTERNAL MEDICINE

## 2021-06-15 PROCEDURE — 36415 COLL VENOUS BLD VENIPUNCTURE: CPT

## 2021-06-15 PROCEDURE — 82962 GLUCOSE BLOOD TEST: CPT

## 2021-06-15 PROCEDURE — 1200000000 HC SEMI PRIVATE

## 2021-06-15 PROCEDURE — 2700000000 HC OXYGEN THERAPY PER DAY

## 2021-06-15 PROCEDURE — 6360000002 HC RX W HCPCS: Performed by: CLINICAL NURSE SPECIALIST

## 2021-06-15 PROCEDURE — 99232 SBSQ HOSP IP/OBS MODERATE 35: CPT | Performed by: STUDENT IN AN ORGANIZED HEALTH CARE EDUCATION/TRAINING PROGRAM

## 2021-06-15 PROCEDURE — 2580000003 HC RX 258: Performed by: FAMILY MEDICINE

## 2021-06-15 PROCEDURE — 6360000002 HC RX W HCPCS: Performed by: STUDENT IN AN ORGANIZED HEALTH CARE EDUCATION/TRAINING PROGRAM

## 2021-06-15 PROCEDURE — 81003 URINALYSIS AUTO W/O SCOPE: CPT

## 2021-06-15 PROCEDURE — 94640 AIRWAY INHALATION TREATMENT: CPT

## 2021-06-15 PROCEDURE — 6370000000 HC RX 637 (ALT 250 FOR IP): Performed by: FAMILY MEDICINE

## 2021-06-15 PROCEDURE — 99232 SBSQ HOSP IP/OBS MODERATE 35: CPT | Performed by: INTERNAL MEDICINE

## 2021-06-15 PROCEDURE — 6370000000 HC RX 637 (ALT 250 FOR IP): Performed by: STUDENT IN AN ORGANIZED HEALTH CARE EDUCATION/TRAINING PROGRAM

## 2021-06-15 PROCEDURE — 6370000000 HC RX 637 (ALT 250 FOR IP): Performed by: CLINICAL NURSE SPECIALIST

## 2021-06-15 PROCEDURE — 6360000002 HC RX W HCPCS: Performed by: FAMILY MEDICINE

## 2021-06-15 PROCEDURE — 94660 CPAP INITIATION&MGMT: CPT

## 2021-06-15 PROCEDURE — 80048 BASIC METABOLIC PNL TOTAL CA: CPT

## 2021-06-15 PROCEDURE — 85025 COMPLETE CBC W/AUTO DIFF WBC: CPT

## 2021-06-15 RX ORDER — CLONIDINE HYDROCHLORIDE 0.1 MG/1
0.1 TABLET ORAL 2 TIMES DAILY
Status: DISCONTINUED | OUTPATIENT
Start: 2021-06-15 | End: 2021-06-15

## 2021-06-15 RX ADMIN — MONTELUKAST SODIUM 10 MG: 10 TABLET, FILM COATED ORAL at 21:59

## 2021-06-15 RX ADMIN — BACLOFEN 10 MG: 10 TABLET ORAL at 15:35

## 2021-06-15 RX ADMIN — Medication 10 ML: at 09:56

## 2021-06-15 RX ADMIN — TRIMETHOBENZAMIDE HYDROCHLORIDE 200 MG: 100 INJECTION INTRAMUSCULAR at 08:38

## 2021-06-15 RX ADMIN — TRAZODONE HYDROCHLORIDE 100 MG: 50 TABLET ORAL at 21:59

## 2021-06-15 RX ADMIN — BUMETANIDE 1 MG: 1 TABLET ORAL at 09:55

## 2021-06-15 RX ADMIN — BUDESONIDE 500 MCG: 0.5 SUSPENSION RESPIRATORY (INHALATION) at 08:25

## 2021-06-15 RX ADMIN — CETIRIZINE HYDROCHLORIDE 10 MG: 10 TABLET, FILM COATED ORAL at 09:55

## 2021-06-15 RX ADMIN — GABAPENTIN 100 MG: 100 CAPSULE ORAL at 09:54

## 2021-06-15 RX ADMIN — MICONAZOLE NITRATE: 2 POWDER TOPICAL at 22:00

## 2021-06-15 RX ADMIN — ENOXAPARIN SODIUM 40 MG: 40 INJECTION SUBCUTANEOUS at 09:56

## 2021-06-15 RX ADMIN — CARVEDILOL 25 MG: 25 TABLET, FILM COATED ORAL at 16:50

## 2021-06-15 RX ADMIN — DOXYCYCLINE HYCLATE 100 MG: 100 CAPSULE ORAL at 09:54

## 2021-06-15 RX ADMIN — HYDRALAZINE HYDROCHLORIDE 50 MG: 50 TABLET ORAL at 15:35

## 2021-06-15 RX ADMIN — ASPIRIN 81 MG: 81 TABLET, CHEWABLE ORAL at 09:54

## 2021-06-15 RX ADMIN — METOCLOPRAMIDE 5 MG: 10 TABLET ORAL at 15:35

## 2021-06-15 RX ADMIN — DOXYCYCLINE HYCLATE 100 MG: 100 CAPSULE ORAL at 21:58

## 2021-06-15 RX ADMIN — INSULIN LISPRO 8 UNITS: 100 INJECTION, SOLUTION INTRAVENOUS; SUBCUTANEOUS at 16:51

## 2021-06-15 RX ADMIN — BACLOFEN 10 MG: 10 TABLET ORAL at 21:59

## 2021-06-15 RX ADMIN — SACUBITRIL AND VALSARTAN 1 TABLET: 49; 51 TABLET, FILM COATED ORAL at 10:03

## 2021-06-15 RX ADMIN — HYDRALAZINE HYDROCHLORIDE 50 MG: 50 TABLET ORAL at 00:35

## 2021-06-15 RX ADMIN — IPRATROPIUM BROMIDE AND ALBUTEROL SULFATE 1 AMPULE: .5; 3 SOLUTION RESPIRATORY (INHALATION) at 12:41

## 2021-06-15 RX ADMIN — IPRATROPIUM BROMIDE AND ALBUTEROL SULFATE 1 AMPULE: .5; 3 SOLUTION RESPIRATORY (INHALATION) at 08:24

## 2021-06-15 RX ADMIN — GABAPENTIN 100 MG: 100 CAPSULE ORAL at 21:59

## 2021-06-15 RX ADMIN — IPRATROPIUM BROMIDE AND ALBUTEROL SULFATE 1 AMPULE: .5; 3 SOLUTION RESPIRATORY (INHALATION) at 17:21

## 2021-06-15 RX ADMIN — DULOXETINE HYDROCHLORIDE 60 MG: 60 CAPSULE, DELAYED RELEASE ORAL at 09:54

## 2021-06-15 RX ADMIN — CARVEDILOL 25 MG: 25 TABLET, FILM COATED ORAL at 09:55

## 2021-06-15 RX ADMIN — INSULIN LISPRO 2 UNITS: 100 INJECTION, SOLUTION INTRAVENOUS; SUBCUTANEOUS at 06:58

## 2021-06-15 RX ADMIN — HYDRALAZINE HYDROCHLORIDE 50 MG: 50 TABLET ORAL at 09:54

## 2021-06-15 RX ADMIN — SPIRONOLACTONE 25 MG: 25 TABLET ORAL at 09:55

## 2021-06-15 RX ADMIN — ISOSORBIDE MONONITRATE 60 MG: 60 TABLET, EXTENDED RELEASE ORAL at 09:55

## 2021-06-15 RX ADMIN — ARFORMOTEROL TARTRATE 15 MCG: 15 SOLUTION RESPIRATORY (INHALATION) at 08:24

## 2021-06-15 RX ADMIN — Medication 10 ML: at 21:59

## 2021-06-15 RX ADMIN — BUDESONIDE 500 MCG: 0.5 SUSPENSION RESPIRATORY (INHALATION) at 21:15

## 2021-06-15 RX ADMIN — ARFORMOTEROL TARTRATE 15 MCG: 15 SOLUTION RESPIRATORY (INHALATION) at 21:15

## 2021-06-15 RX ADMIN — DULOXETINE HYDROCHLORIDE 60 MG: 60 CAPSULE, DELAYED RELEASE ORAL at 22:02

## 2021-06-15 RX ADMIN — ACETAMINOPHEN 650 MG: 325 TABLET ORAL at 12:46

## 2021-06-15 RX ADMIN — METOCLOPRAMIDE 5 MG: 10 TABLET ORAL at 09:54

## 2021-06-15 RX ADMIN — INSULIN LISPRO 2 UNITS: 100 INJECTION, SOLUTION INTRAVENOUS; SUBCUTANEOUS at 10:58

## 2021-06-15 RX ADMIN — INSULIN LISPRO 4 UNITS: 100 INJECTION, SOLUTION INTRAVENOUS; SUBCUTANEOUS at 22:02

## 2021-06-15 RX ADMIN — BACLOFEN 10 MG: 10 TABLET ORAL at 09:54

## 2021-06-15 RX ADMIN — TROSPIUM CHLORIDE 20 MG: 20 TABLET, FILM COATED ORAL at 06:54

## 2021-06-15 RX ADMIN — FLUTICASONE PROPIONATE 1 SPRAY: 50 SPRAY, METERED NASAL at 09:56

## 2021-06-15 RX ADMIN — TROSPIUM CHLORIDE 20 MG: 20 TABLET, FILM COATED ORAL at 15:35

## 2021-06-15 RX ADMIN — METOCLOPRAMIDE 5 MG: 10 TABLET ORAL at 21:58

## 2021-06-15 RX ADMIN — PREDNISONE 30 MG: 5 TABLET ORAL at 09:55

## 2021-06-15 RX ADMIN — SACUBITRIL AND VALSARTAN 1 TABLET: 49; 51 TABLET, FILM COATED ORAL at 22:02

## 2021-06-15 RX ADMIN — IPRATROPIUM BROMIDE AND ALBUTEROL SULFATE 1 AMPULE: .5; 3 SOLUTION RESPIRATORY (INHALATION) at 21:14

## 2021-06-15 RX ADMIN — DOCUSATE SODIUM 100 MG: 100 CAPSULE, LIQUID FILLED ORAL at 21:59

## 2021-06-15 ASSESSMENT — PAIN DESCRIPTION - PROGRESSION
CLINICAL_PROGRESSION: GRADUALLY WORSENING

## 2021-06-15 ASSESSMENT — PAIN SCALES - GENERAL
PAINLEVEL_OUTOF10: 5
PAINLEVEL_OUTOF10: 0

## 2021-06-15 NOTE — CARE COORDINATION
CM NOTE: Per QFR--- Not ready for discharge home today as pt still having headache, is hypertensive & throwing up. Following to determine if home O2 needs have changed. Plan remains home with daily aides. No PT/OT needs at this time. CM spoke with Derick Reyes @ [facility]. Explained portable tank needs delivered to [facility] as pt does not have them at home (She was informed by [facility] many mos ago that she needs to call them & order additional tanks). Per Madyson---[facility] will deliver a tank to her room here for use to return home. WILL NEED TO REMAIN HERE UNTIL [address] ARRIVES.

## 2021-06-15 NOTE — PROGRESS NOTES
Pulse ox was_92-93___% on room air at rest.  Ambulated patient on room air. Oxygen saturation was _86-87___% on room air while ambulating. Oxygen applied. Recovery pulse ox was __95___% on ___3__liters of oxygen while ambulating.     Electronically signed by Kaye Almonte RN on 6/15/2021 at 12:36 PM

## 2021-06-15 NOTE — PROGRESS NOTES
Pulmonary Progress Note    Admit Date: 6/9/2021                            PCP: Judith Arreola,   Active Problems:    Acute respiratory failure with hypoxia ([address]    Pneumonia of both lungs due to infectious organism  Resolved Problems:    * No resolved hospital problems.  *      Subjective:  Sitting up in the chair, on room air   Feeling better, ready to go home     Medications:   sodium chloride      dextrose          sacubitril-valsartan  1 tablet Oral BID    baclofen  10 mg Oral TID    doxycycline hyclate  100 mg Oral 2 times per day    bumetanide  1 mg Oral Daily    docusate sodium  100 mg Oral BID    predniSONE  30 mg Oral Daily    Followed by   Codey Doll ON 6/18/2021] predniSONE  20 mg Oral Daily    Followed by   Codey Doll ON 6/21/2021] predniSONE  10 mg Oral Daily    Arformoterol Tartrate  15 mcg Nebulization BID    budesonide  0.5 mg Nebulization BID    aspirin  81 mg Oral Daily    carvedilol  25 mg Oral BID WC    cetirizine  10 mg Oral Daily    DULoxetine  60 mg Oral BID    fluticasone  1 spray Each Nostril Daily    gabapentin  100 mg Oral BID    hydrALAZINE  50 mg Oral Q8H    sodium chloride flush  5-40 mL Intravenous 2 times per day    enoxaparin  40 mg Subcutaneous Daily    insulin lispro  0-12 Units Subcutaneous TID WC    insulin lispro  0-6 Units Subcutaneous Nightly    isosorbide mononitrate  60 mg Oral Daily    metoclopramide  5 mg Oral TID    trospium  20 mg Oral BID AC    montelukast  10 mg Oral Nightly    spironolactone  25 mg Oral Daily    traZODone  100 mg Oral Nightly    simvastatin  20 mg Oral Nightly    miconazole   Topical BID    ipratropium-albuterol  1 ampule Inhalation Q4H WA       Vitals:  VITALS:  /74   Pulse 75   Temp 98.7 °F (37.1 °C) (Oral)   Resp 16   Ht 5' 2\" (1.575 m)   Wt 229 lb (103.9 kg)   LMP 01/01/1990   SpO2 94%   BMI 41.88 kg/m²   24HR INTAKE/OUTPUT:      Intake/Output Summary (Last 24 hours) at 6/15/2021 1246  Last data filed at 2021 1619  Gross per 24 hour   Intake --   Output 1950 ml   Net -1950 ml     CURRENT PULSE OXIMETRY:  SpO2: 94 %  24HR PULSE OXIMETRY RANGE:  SpO2  Av.3 %  Min: 91 %  Max: 97 %  CVP:    VENT SETTINGS:   Vent Information  Skin Assessment: Clean, dry, & intact  SpO2: 94 %  Mask Type: Full face mask  Mask Size: Small  Additional Respiratory  Assessments  Pulse: 75  Resp: 16  SpO2: 94 %      EXAM:  General: Alert, in NAD  ENT: No discharge. Pharynx clear. membranes moist   Neck: Supple, Trachea midline. Crowded airway   Resp: No accessory muscle use. Non-labored. Lungs with coarse crackles bibasilar that partially clear with cough. CV: Regular rate. Regular rhythm. No murmur . Trace BLE edema. ABD: non-tender. Non-distended. Soft, round . Normal bowel sounds. Skin: Warm and dry. M/S: No cyanosis. No joint deformity. No clubbing. Neuro: Awake. Follows commands. O x 3, PEDROZA  Psych:  calm and interactive     I/O: I/O last 3 completed shifts:  In: -   Out: 2250 [Urine:2250]  No intake/output data recorded. Results:  CBC:   Recent Labs     21  0935 21  0820 06/15/21  0725   WBC 7.4 9.8 10.1   HGB 12.4 13.6 13.2   HCT 41.2 45.2 42.9   MCV 99.3 98.5 98.2    312 278     BMP:   Recent Labs     21  0935 21  0820 06/15/21  0725    140 137   K 3.9 4.7 4.9   CL 94* 97* 94*   CO2 41* 37* 33*   BUN 19 22 23   CREATININE 1.1* 1.0 1.0       Cultures:  Results for [place] (MRN [id number]) as of 2021 13:38   Ref.  Range 2021 21:00   Influenza A by PCR Latest Ref Range: Not Detected  Not Detected   Influenza B by PCR Latest Ref Range: Not Detected  Not Detected   Adenovirus by PCR Latest Ref Range: Not Detected  Not Detected   Coronavirus 229E by PCR Latest Ref Range: Not Detected  Not Detected   Coronavirus HKU1 by PCR Latest Ref Range: Not Detected  Not Detected   Coronavirus NL63 by PCR Latest Ref Range: Not Detected  Not Detected   Coronavirus OC43 by on Chronic Hypoxic and hypercapnic Respiratory Failure resolved patient is on 1 L now  2. History of Covid 4/2021 post-COVID Parenchymal Scarring/Fibrosis with Superimposed Parenchymal Bronchiolitis   3. Small B/L Pleural Effusions   4. Asthma/COPD/Severe restrictive lung disease with positive methacholine challenge with mild intermittent asthma  5. DAYAN on BiPAP 27/23 daily at bedtime  6. Morbid Obesity  BMI 41.8  7. Elevated blood sugars  8. Ischemic cardiomyopathy s/p CABGt/non-ST MI 6/2018  9. Acute on chronic  diastolic dysfunction Echo 6/5/2018 EF 65% stage II DD on Entresto  10. Chronic RBBB  11. Lymphopenia   12. Chronic pain syndrome  13. Chronic constipation  14. Recent admissions  3/12/2021 with hypertensive urgency  4/20/2021 with Covid pneumonitis patient was treated with remdesivir and steroids       PLAN:  1.) Continue O2, currently on RA - baseline is room air at home. Wears Triliogy at home with 4L bleed in HS. CPAP HS while here. 2.) Ambulatory pulse ox prior to d/c for home O2 needs-completed yesterday 6/14 and dropped to 89%, did not qualify, repeated today and did drop to 86% on room air-which does qualify--and recovered on 3L, this will need home o2 set up before DC.   3.) PCT 0.02, afebrile , normal WBC.  Continue Doxycycline for 7 days for bronchitis  4.) Respiratory culture negative  5.) Continue prednisone taper   6.) Continue flonase and zyrtec   7.) On lovenox 40 QD  8.) On spiriva and symbicort at home- continue brovana and budesonide here   9.) Supportive care  10.) Stable from a pulmonary standpoint       PATRIZIA Morgan  6/15/2021  12:46 PM  Seen and evaluated, agree with above assessment and plan  Currently on room air at rest, supplemental O2 with exertion  Pulmonary will sign off  Follow-up as an outpatient 4 to 6 weeks

## 2021-06-15 NOTE — PROGRESS NOTES
Nutrition Education    · Verbally reviewed information with Patient  · Educated on Carb counting for DM   · Written educational materials provided. · Contact name and number provided. · Refer to Patient Education activity for more details.     Electronically signed by Alejandro Ley RD, LD on 6/15/21 at 4:08 PM EDT    Contact: [phone]

## 2021-06-15 NOTE — PROGRESS NOTES
OUTPATIENT CARDIOLOGY FOLLOW-UP    Name: Reyes Bill    Age: 76 y.o. Primary Care Physician: Judith Arreola DO    Date of Service: 6/15/2021    Chief Complaint:   Chief Complaint   Patient presents with    Cough     hypoxic at [facility] office, covid in april    Shortness of Breath       Interim History:  Mrs. Sherman Torres is a 58-year-old  female with history of for coronary artery disease underwent CABG in 2011 and history of severe LV dysfunction with an EF of 25% based on echocardiogram done in 2013, cardiac cath in 2013 showed no progression of disease, chronic right bundle-branch block, type II diabetes, hypertension, hyperlipidemia, morbid obesity, active tobacco use still smoking about 10 cigarettes a day, marijuana use, obstructive sleep apnea on CPAP and history of severe peripheral vascular disease came for follow-up visit. She was last seen in the office was on 9/17/2020, Since her last evaluation, she progressed from 4/19/2021 to 4/25/2021 with a COVID-19 pneumonia radiotherapy steroids and antibiotics. .  She underwent left popliteal and tibial trunk atherectomy on 11/13/2018 and was initiated on plavix and aspirin. She was discharged home on oxygen and she has been using mainly at nighttime. She denies any chest pain, increased dyspnea or leg edema. She is still smoking about 5 cigarettes a day and does not want to quit. She told me her blood pressure sometimes running high at home. She has been using wrist blood pressure machine. She was was recommended to take Coreg 12.5 mg twice daily but she has been taking 25 mg in the evening and 50 mg in the morning. Today, blood pressure still elevated but improving. This morning, she vomited and has been nauseated. She is also complaining of abdominal discomfort and had a 3 episodes of diarrhea. Yesterday, she was constipated and did receive a laxative. She denies any fever.   No new cardiac symptoms and gets dyspnea with exertion. Still have some headache. SR on EKG.     Review of Systems:   Cardiac: As per HPI  General: No fever, chills  Pulmonary: As per HPI  HEENT: No visual disturbances, difficult swallowing  GI: No nausea, vomiting  Endocrine: No thyroid disease or DM  Musculoskeletal: PEDROZA x 4, no focal motor deficits  Skin: Intact, no rashes  Neuro/Psych: No headache or seizures    Past Medical History:  Past Medical History:   Diagnosis Date    Asthma     Atherosclerosis of native artery of left leg with rest pain ([address] 11/9/2018    C. difficile diarrhea 2015    CAD (coronary artery disease) 2011    Cellulitis and abscess of trunk 4/14/2015    Cerebellar infarct ([address] 4/3/2019    Remote, rt. cerebellum, head CT scan, 1/9/19    Chronic back pain     Chronic kidney disease     Chronic systolic congestive heart failure ([address] 10/4/2013    Chronic venous insufficiency 10/11/2017    COPD (chronic obstructive pulmonary disease) ([address]     Depression     Diabetes mellitus ([address]     Diabetic neuropathy ([address]     Diverticulosis     Fatty liver 1/8/2016    per [place]    Glaucoma, open angle 2/1/2016    Mild-OU    Hepatic encephalopathy ([address] 02/07/2016    resolved    Hiatal hernia     History of blood transfusion     Hyperlipidemia     Hyperplastic colon polyp     Hypertension     Incontinence     Liver mass     Morbid obesity ([address]     Movement disorder     Myocardial infarction ([address] 2011    Osteoarthritis, generalized     Pain in both lower legs 10/11/2017    Peripheral vascular disease ([address]     Pneumonia 1/26/2014    Pulmonary edema     resolved    PVD (peripheral vascular disease) with claudication ([address] 8/30/2017    Sleep apnea     uses BI PAP    Status post peripheral artery angioplasty 10/31/2019    Tobacco abuse     Tobacco abuse 10/11/2017    Tubular adenoma polyp of rectum     Urinary tract infection due to ESBL Klebsiella 03/08/2017    Ventral hernia        Past Surgical History:  Past 5th day out of the week\"    Sexual activity: Never   Other Topics Concern    Not on file   Social History Narrative    Pt lives with brother in her house-pt has never driven a car and depends on transportation     Social Determinants of Health     Financial Resource Strain:     Difficulty of Paying Living Expenses:    Food Insecurity:     Worried About Running Out of Food in the Last Year:     [address] in the Last Year:    Transportation Needs:     Lack of Transportation (Medical):  Lack of Transportation (Non-Medical):    Physical Activity:     Days of Exercise per Week:     Minutes of Exercise per Session:    Stress:     Feeling of Stress :    Social Connections:     Frequency of Communication with Friends and Family:     Frequency of Social Gatherings with Friends and Family:     Attends [employer] Services:     Active Member of Clubs or Organizations:     Attends Club or Organization Meetings:     Marital Status:    Intimate Partner Violence:     Fear of Current or Ex-Partner:     Emotionally Abused:     Physically Abused:     Sexually Abused: Allergies:   Allergies   Allergen Reactions    Latex Hives    Bee Venom Anaphylaxis    Dilaudid [Hydromorphone Hcl] Itching    Dye [Iodides] Hives and Shortness Of Breath    Percocet [Oxycodone-Acetaminophen] Shortness Of Breath and Itching    Keflex [Cephalexin] Itching and Rash    Lasix [Furosemide] Other (See Comments)     Pt states she cramps up and gets headaches    Levaquin [Levofloxacin In D5w] Hives    Lipitor      MUSCLE SPASMS    Lyrica [Pregabalin]      Dream disturbances    Morphine Hives    Naproxen      Unsure of reaction;pt states able to take Aleve without difficulties    Nefazodone Other (See Comments)    Norvasc [Amlodipine] Swelling    Oxycodone-Acetaminophen Swelling    Shellfish-Derived Products     Trazodone And Nefazodone        Current Medications:  Current Facility-Administered Medications Medication Dose Route Frequency Provider Last Rate Last Admin    hydrALAZINE (APRESOLINE) injection 10 mg  10 mg Intravenous Q6H PRN Camryn Bateman MD   10 mg at 06/14/21 2152    baclofen (LIORESAL) tablet 10 mg  10 mg Oral TID Camryn Bateman MD   10 mg at 06/14/21 2139    doxycycline hyclate (VIBRAMYCIN) capsule 100 mg  100 mg Oral 2 times per day Isai Mo MD   100 mg at 06/14/21 2138    bumetanide (BUMEX) tablet 1 mg  1 mg Oral Daily Camryn Bateman MD   1 mg at 06/14/21 0813    docusate sodium (COLACE) capsule 100 mg  100 mg Oral BID Camryn Bateman MD   100 mg at 06/14/21 2139    bisacodyl (DULCOLAX) suppository 10 mg  10 mg Rectal Daily PRN Camryn Bateman MD        predniSONE (DELTASONE) tablet 30 mg  30 mg Oral Daily PATRIZIA Dumont        Followed by   Stanley Vivar ON 6/18/2021] predniSONE (DELTASONE) tablet 20 mg  20 mg Oral Daily PATRIZIA Dumont        Followed by   Stanley Vivar ON 6/21/2021] predniSONE (DELTASONE) tablet 10 mg  10 mg Oral Daily PATRIZIA Dumont        Arformoterol Tartrate (BROVANA) nebulizer solution 15 mcg  15 mcg Nebulization BID PATRIZIA Dumont   15 mcg at 06/14/21 2037    budesonide (PULMICORT) nebulizer suspension 500 mcg  0.5 mg Nebulization BID PATRIZIA Dumont   500 mcg at 06/14/21 2037    aspirin chewable tablet 81 mg  81 mg Oral Daily Philly Lockhart MD   81 mg at 06/14/21 0815    carvedilol (COREG) tablet 25 mg  25 mg Oral BID  Philly Lockhart MD   25 mg at 06/14/21 1614    cetirizine (ZYRTEC) tablet 10 mg  10 mg Oral Daily Philly Lockhart MD   10 mg at 06/14/21 0815    DULoxetine (CYMBALTA) extended release capsule 60 mg  60 mg Oral BID Philly Lockhart MD   60 mg at 06/14/21 2139    sacubitril-valsartan (ENTRESTO) 24-26 MG per tablet 1 tablet  1 tablet Oral BID Philly Lockhart MD   1 tablet at 06/14/21 2138    fluticasone (FLONASE) 50 MCG/ACT nasal spray 1 spray  1 spray Each Nostril Daily Philly Lockhart MD   1 spray at 06/14/21 0815    gabapentin (NEURONTIN) capsule 100 mg  100 mg Oral BID Chayito Santos MD   100 mg at 06/14/21 2139    hydrALAZINE (APRESOLINE) tablet 50 mg  50 mg Oral Q8H Chayito Santos MD   50 mg at 06/15/21 0035    sodium chloride flush 0.9 % injection 5-40 mL  5-40 mL Intravenous 2 times per day Chayito Santos MD   10 mL at 06/14/21 2138    sodium chloride flush 0.9 % injection 5-40 mL  5-40 mL Intravenous PRN Chayito Santos MD   10 mL at 06/11/21 1202    0.9 % sodium chloride infusion  25 mL Intravenous PRN Chayito Santos MD        enoxaparin (LOVENOX) injection 40 mg  40 mg Subcutaneous Daily Chayito Santos MD   40 mg at 06/14/21 0816    polyethylene glycol (GLYCOLAX) packet 17 g  17 g Oral Daily PRN Chayito Santos MD   17 g at 06/14/21 1034    acetaminophen (TYLENOL) tablet 650 mg  650 mg Oral Q6H PRN Chayito Santos MD   650 mg at 06/14/21 1424    Or    acetaminophen (TYLENOL) suppository 650 mg  650 mg Rectal Q6H PRN Chayito Santos MD        guaiFENesin tablet 400 mg  400 mg Oral 4x Daily PRN Chaytio Santos MD   400 mg at 06/13/21 0902    insulin lispro (HUMALOG) injection vial 0-12 Units  0-12 Units Subcutaneous TID WC Chayito Santos MD   2 Units at 06/15/21 0658    insulin lispro (HUMALOG) injection vial 0-6 Units  0-6 Units Subcutaneous Nightly Chayito Santos MD   3 Units at 06/14/21 2139    glucose (GLUTOSE) 40 % oral gel 15 g  15 g Oral PRN Chayito Santos MD        dextrose 50 % IV solution  12.5 g Intravenous PRN Chayito Santos MD        glucagon (rDNA) injection 1 mg  1 mg Intramuscular PRN Chayito Santos MD        dextrose 5 % solution  100 mL/hr Intravenous PRN Chayito Santos MD        isosorbide mononitrate (IMDUR) extended release tablet 60 mg  60 mg Oral Daily Chayito Santos MD   60 mg at 06/14/21 0814    metoclopramide (REGLAN) tablet 5 mg  5 mg Oral TID Chayito Santos MD   5 mg at 06/14/21 2138    trospium (SANCTURA) tablet 20 mg  20 mg Oral BID AC Chayito Santos, MD   20 mg at 06/15/21 0654    montelukast (SINGULAIR) tablet 10 mg  10 mg Oral Nightly Jaylon Das MD   10 mg at 06/14/21 2139    spironolactone (ALDACTONE) tablet 25 mg  25 mg Oral Daily Jaylon Das MD   25 mg at 06/14/21 1034    traZODone (DESYREL) tablet 100 mg  100 mg Oral Nightly Jaylon Das MD   100 mg at 06/14/21 2138    simvastatin (ZOCOR) tablet 20 mg  20 mg Oral Nightly Jaylon Das MD        albuterol (PROVENTIL) nebulizer solution 2.5 mg  2.5 mg Nebulization Q2H PRN Jaylon Das MD        miconazole (MICOTIN) 2 % powder   Topical BID Marcos Sharpe MD   Given at 06/14/21 2139    ipratropium-albuterol (DUONEB) nebulizer solution 1 ampule  1 ampule Inhalation Q4H WA Marcos Sharpe MD   1 ampule at 06/14/21 2037       Physical Exam:  BP (!) 165/71   Pulse 74   Temp 98.4 °F (36.9 °C) (Oral)   Resp 18   Ht 5' 2\" (1.575 m)   Wt 229 lb (103.9 kg)   LMP 01/01/1990   SpO2 97%   BMI 41.88 kg/m²   Wt Readings from Last 3 Encounters:   06/13/21 229 lb (103.9 kg)   05/20/21 219 lb (99.3 kg)   05/19/21 220 lb (99.8 kg)     Appearance: Awake, alert and oriented x 3, no acute respiratory distress she is morbidly obese. Skin: Intact, no rash  Head: Normocephalic, atraumatic  Eyes: EOMI, no conjunctival erythema  ENMT: No pharyngeal erythema, MMM, no rhinorrhea  Neck: Supple, no elevated JVP, no carotid bruits  Lungs: Clear to auscultation bilaterally. No wheezes, rales, or rhonchi.   Cardiac: Regular rate and rhythm, +S1S2, no murmurs apparent  Abdomen: Soft, nontender, +bowel sounds  Extremities: Moves all extremities x 4, no lower extremity edema  Neurologic: No focal motor deficits apparent, normal mood and affect, alert and oriented x 3  Peripheral Pulses: Intact posterior tibial pulses bilaterally    Laboratory Tests:  Lab Results   Component Value Date    CREATININE 1.0 06/14/2021    BUN 22 06/14/2021     06/14/2021    K 4.7 06/14/2021    CL 97 (L) 06/14/2021    CO2 37 reduced ejection fraction, increased Entresto 49/51 twice a day and Coreg  25 mg p.o. twice daily, hydralazine 50 mg 3 times a day and isosorbide mononitrate 60 mg daily a day and spironolactone 25 mg to her regimen. Continue Bumex to 1 mg po daily. 2. continue rest of the medications including hydralazine 50 mg 3 times a day . 3. She was advised again to stop smoking and told me that she does not want to quit. 4. Recheck blood pressures at noon and if the blood pressure remains controlled, she remains stable without nausea vomiting or diarrhea and she could be discharged from the cardiology standpoint. She can follow-up with me in 1 month. The patient's current medication list, allergies, problem list and results of all previously ordered testing were reviewed at today's visit.   Len Bustillo MD  [phone]  Cardiology

## 2021-06-15 NOTE — PROGRESS NOTES
Occupational Therapy  Patient treatment attempted this PM.  Patient initially agreeable to therapy. Complaint of high blood pressure. Medical equipment provider arrived to room to drop off O2 tank. Pt upset and requesting nurse to intervene. Nursing notified. Will attempt at another time as unsure how long pt needs to resolve this situation.    Natalya Fears LANE/L [id number]

## 2021-06-15 NOTE — PROGRESS NOTES
[facility] Progress Note    Admitting Date and Time: 6/9/2021  2:35 PM  Admit Dx: Acute respiratory failure with hypoxia ([facility] [J96.01]    Subjective:  Patient is being followed for Acute respiratory failure with hypoxia ([facility] [J96.01]   Pt reports diffuse lower abdominal pain preceded by one episode of vomiting, dysuria. Per RN: No major concerns except 1 episode vomiting, lower abdominal pain and elevated blood pressure. ROS: denies fever, chills, cp, sob, n/v, HA unless stated above.      sacubitril-valsartan  1 tablet Oral BID    baclofen  10 mg Oral TID    doxycycline hyclate  100 mg Oral 2 times per day    bumetanide  1 mg Oral Daily    docusate sodium  100 mg Oral BID    predniSONE  30 mg Oral Daily    Followed by   Keyur Newell ON 6/18/2021] predniSONE  20 mg Oral Daily    Followed by   Keyur Newell ON 6/21/2021] predniSONE  10 mg Oral Daily    Arformoterol Tartrate  15 mcg Nebulization BID    budesonide  0.5 mg Nebulization BID    aspirin  81 mg Oral Daily    carvedilol  25 mg Oral BID WC    cetirizine  10 mg Oral Daily    DULoxetine  60 mg Oral BID    fluticasone  1 spray Each Nostril Daily    gabapentin  100 mg Oral BID    hydrALAZINE  50 mg Oral Q8H    sodium chloride flush  5-40 mL Intravenous 2 times per day    enoxaparin  40 mg Subcutaneous Daily    insulin lispro  0-12 Units Subcutaneous TID WC    insulin lispro  0-6 Units Subcutaneous Nightly    isosorbide mononitrate  60 mg Oral Daily    metoclopramide  5 mg Oral TID    trospium  20 mg Oral BID AC    montelukast  10 mg Oral Nightly    spironolactone  25 mg Oral Daily    traZODone  100 mg Oral Nightly    simvastatin  20 mg Oral Nightly    miconazole   Topical BID    ipratropium-albuterol  1 ampule Inhalation Q4H WA     trimethobenzamide, 200 mg, Q6H PRN  hydrALAZINE, 10 mg, Q6H PRN  bisacodyl, 10 mg, Daily PRN  sodium chloride flush, 5-40 mL, PRN  sodium chloride, 25 mL, PRN  polyethylene glycol, 17 g, Daily PRN  acetaminophen, 650 mg, Q6H PRN   Or  acetaminophen, 650 mg, Q6H PRN  guaiFENesin, 400 mg, 4x Daily PRN  glucose, 15 g, PRN  dextrose, 12.5 g, PRN  glucagon (rDNA), 1 mg, PRN  dextrose, 100 mL/hr, PRN  albuterol, 2.5 mg, Q2H PRN         Objective:    BP (!) 156/89   Pulse 77   Temp 99.5 °F (37.5 °C) (Oral)   Resp 18   Ht 5' 2\" (1.575 m)   Wt 229 lb (103.9 kg)   LMP 01/01/1990   SpO2 96%   BMI 41.88 kg/m²     General Appearance: alert and oriented to person, place and time and in no acute distress, obese  Skin: warm and dry  Head: normocephalic and atraumatic  Eyes: pupils equal, round, and reactive to light, extraocular eye movements intact, conjunctivae normal  Neck: neck supple and non tender without mass, no JVD.   Pulmonary/Chest: clear to auscultation bilaterally- no wheezes, rales or rhonchi, normal air movement, no respiratory distress  Cardiovascular: normal rate, normal S1 and S2 and no carotid bruits  Abdomen: soft, + tender, non-distended, normal bowel sounds, no masses or organomegaly  Extremities: no cyanosis, no clubbing and no edema  Neurologic: no cranial nerve deficit and speech normal        Recent Labs     06/13/21  0935 06/14/21  0820 06/15/21  0725    140 137   K 3.9 4.7 4.9   CL 94* 97* 94*   CO2 41* 37* 33*   BUN 19 22 23   CREATININE 1.1* 1.0 1.0   GLUCOSE 124* 143* 159*   CALCIUM 9.6 9.6 9.7       Recent Labs     06/13/21  0935 06/14/21  0820 06/15/21  0725   WBC 7.4 9.8 10.1   RBC 4.15 4.59 4.37   HGB 12.4 13.6 13.2   HCT 41.2 45.2 42.9   MCV 99.3 98.5 98.2   MCH 29.9 29.6 30.2   MCHC 30.1* 30.1* 30.8*   RDW 14.1 13.9 14.3    312 278   MPV 10.3 10.1 10.7       Micro:  No components found for: [facility])    Radiology:   Echo Complete    Result Date: 6/11/2021  Transthoracic Echocardiography Report (TTE)  Demographics   Patient Name    [facility]         Gender            Female                  [address] [place] [address]  [id number]       Room Number       0602  Number aortic stenosis is present. No evidence of aortic valve regurgitation. Pulmonic Valve  The pulmonic valve was not well visualized. No evidence of any pulmonic regurgitation. No evidence of pulmonic valve stenosis. Pericardial Effusion  No evidence for hemodynamically significant pericardial effusion. Pleural Effusion  No evidence of pleural effusion. Aorta  Normal aortic root and ascending aorta. Miscellaneous  Inferior Vena Cava not well visualized. Conclusions   Summary  Left ventricle size is normal.  Ejection fraction is visually estimated at 55-60%. No regional wall motion abnormalities seen. Severe concentric left ventricular hypertrophy. Indeterminate diastolic function. Normal right ventricular size. Right ventricle global systolic function is mildly reduced . TAPSE 15 mm. No hemodynamically significant aortic stenosis is present. Physiologic and/or trace tricuspid regurgitation. RVSP is 26 mmHg. Normal estimated PA systolic pressure. No evidence for hemodynamically significant pericardial effusion.    Signature   ----------------------------------------------------------------  Electronically signed by Kerry Coker MD(Interpreting  physician) on 06/11/2021 07:42 PM  ----------------------------------------------------------------  M-Mode/2D Measurements & Calculations   LV Diastolic    LV Systolic Dimension: 3.7   AV Cusp Separation: 1 cmLA  Dimension: 5.2  cm                           Dimension: 5.3 cmAO Root  cm              LV Volume Diastolic: 084.6   Dimension: 2.2 cm  LV FS:28.9 %    ml  LV PW           LV Volume Systolic: 83.5 ml  Diastolic: 1.3  LV EDV/LV EDV Index: 131.9  cm              ml/65 ml/m^2LV ESV/LV ESV    RV Diastolic Dimension: 3.1  LV PW Systolic: Index: 85.7 MU/06JA/ m^2     cm  1.5 cm          EF Calculated: 55.1 %  Septum          LV Mass Index: 137 l/min*m^2 LA/Aorta: 1.56  Diastolic: 1.3                               Ascending Aorta: 3.2 cm  cm LA volume/Index: 62 ml  Septum          LVOT: 2 cm                   /14DH/Q^6  Systolic: 1.5                                RA Area: 16.9 cm^2  cm  CO: 6.05 l/min                               IVC Expiration: 1.8 cm  CI: 2.98  l/m*m^2  LV Mass: 278.44  g  Doppler Measurements & Calculations   MV Peak E-Wave: 1.14 AV Peak Velocity:     LVOT Peak Velocity: 1.14 m/s  m/s                  1.55 m/s              LVOT Mean Velocity: 0.93 m/s  MV Peak A-Wave: 1.04 AV Peak Gradient:     LVOT Peak Gradient: 5.2  m/s                  9.62 mmHg             mmHgLVOT Mean Gradient: 3.6  MV E/A Ratio: 1.1    AV Mean Velocity:     mmHg  MV Peak Gradient:    1.07 m/s              Estimated RVSP: 26 mmHg  9.7 mmHg             AV Mean Gradient: 5.1 Estimated RAP:8 mmHg  MV Mean Gradient: 5  mmHg  mmHg                 AV VTI: 32.6 cm  MV Mean Velocity:    AV Area               TR Velocity:2.14 m/s  1.08 m/s             (Continuity):2.48     TR Gradient:18.32 mmHg  MV Deceleration      cm^2                  PV Peak Velocity: 0.8 m/s  Time: 200.6 msec                           PV Peak Gradient: 2.59 mmHg  MV P1/2t: 58.8 msec  LVOT VTI: 25.7 cm     PV Mean Velocity: 0.55 m/s  MVA by PHT:3.74 cm^2 IVRT: 106.1 msec      PV Mean Gradient: 1.4 mmHg  MV Area              Estimated PASP: 26.32  (continuity): 1.9    mmHg  cm^2  MV E' Septal  Velocity: 0.06 m/s  MV E' Lateral  Velocity: 6 m/s  http://[email or web address] with [email or web address]? [id number]      Assessment:    Active Problems:    Acute respiratory failure with hypoxia (HCC)    Pneumonia of both lungs due to infectious organism  Resolved Problems:    * No resolved hospital problems. *      Plan:    1.   Acute on chronic hypoxic and hypercapnic respiratory failure likely secondary to exacerbation of COPD -patient currently on 4 L which is her baseline at home (uses Trelegy, CPAP 5 nightly at home), on steroids, respiratory support.  Respiratory panel negative.  Procalcitonin less than 0.02.  Pulmonary feels has scarring/fibrosis and superimposed bronchiolitis, post Covid. Patient off antibiotics, on nebulizations. Pulmonology okay for discharge on p.o. steroids and doxy po.  2.  T2DM - Diabetic diet.  Glucose POCT.  SS insulin scale. On April 20th, 2021, hemoglobin A1c 6.5%. 6/11- HgbA1c 6.8%. Will need tighter control on steroids. 3.  Acute on chronic diastolic CHF -continue meds.  2D echo - 6/11 -ejection fraction 55 to 60% with severe concentric LVH, diastolic dysfunction, RVSP 26. On Bumex, Coreg, hydralazine, Imdur, Entresto and spironolactone, resolved today, will restart and monitor. 4.  History of COVID-19 in April 2021 - supportive care. 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 5.  Candida dermatitis - miconazole ointment and powder. 6.  Constipation with lower abdominal discomfort - added laxatives. 7.  CAD s/p CABG -on optimal medical management. 8.  Hyperlipidemia  9. Hypertensive urgency  - uncontrolled Hypertension - home meds -including Entresto and Aldactone and prn hydralazine  10. Chronic tobacco abuse-counseled to quit smoking, patient not motivated at this time. 11 Hypokalemia -resolved, Entresto and spironolactone restarted  Still holding spironolactone. 12. Left knee pain - hx of recent fall and trauma to knee. We will follow up x-ray. 13. Back spasms - as needed baclofen  14. Metabolic alkalosis -most likely contraction from ongoing diuresis,  post -3 L diuresis, changed IV Bumex to p.o. in consultation with cardiology. 15.  Diffuse lower abdominal pain, one episode of vomiting and dysuria -UA which was unremarkable, on Tigan, will monitor. DVT prophylaxis -Lovenox      NOTE: This report was transcribed using voice recognition software.  Every effort was made to ensure accuracy; however, inadvertent computerized transcription errors may be present.   Electronically signed by Juan Luis Diane MD on 6/15/2021 at 2:25 PM

## 2021-06-15 NOTE — PROGRESS NOTES
[facility] Quality Flow/Interdisciplinary Rounds Progress Note        Quality Flow Rounds held on Therese 15, 2021    Disciplines Attending:  Bedside Nurse, ,  and Nursing Unit Leadership    Marly Cardoso was admitted on 6/9/2021  2:35 PM    Anticipated Discharge Date:  Expected Discharge Date: 06/14/21    Disposition:    Jadon Score:  Jadon Scale Score: 19    Readmission Risk              Risk of Unplanned Readmission:  37           Discussed patient goal for the day, patient clinical progression, and barriers to discharge. The following Goal(s) of the Day/Commitment(s) have been identified: Monitor vitals and labs, wean oxygen as tolerated, and discharge planning home.     Reji Carroll RN  Therese 15, 2021

## 2021-06-15 NOTE — PROGRESS NOTES
Reported patient vomiting to Dr. Evelina Steel. New orders for Tigan.     Electronically signed by Abdelrahman Carson RN on 6/15/2021 at 8:10 AM

## 2021-06-16 VITALS
BODY MASS INDEX: 42.14 KG/M2 | RESPIRATION RATE: 16 BRPM | WEIGHT: 229 LBS | DIASTOLIC BLOOD PRESSURE: 68 MMHG | HEIGHT: 62 IN | HEART RATE: 83 BPM | OXYGEN SATURATION: 93 % | SYSTOLIC BLOOD PRESSURE: 134 MMHG | TEMPERATURE: 97.7 F

## 2021-06-16 LAB
ANION GAP SERPL CALCULATED.3IONS-SCNC: 8 MMOL/L (ref 7–16)
BASOPHILS ABSOLUTE: 0.01 E9/L (ref 0–0.2)
BASOPHILS RELATIVE PERCENT: 0.1 % (ref 0–2)
BUN BLDV-MCNC: 27 MG/DL (ref 6–23)
CALCIUM SERPL-MCNC: 9.1 MG/DL (ref 8.6–10.2)
CHLORIDE BLD-SCNC: 94 MMOL/L (ref 98–107)
CO2: 33 MMOL/L (ref 22–29)
CREAT SERPL-MCNC: 1.1 MG/DL (ref 0.5–1)
EOSINOPHILS ABSOLUTE: 0.04 E9/L (ref 0.05–0.5)
EOSINOPHILS RELATIVE PERCENT: 0.4 % (ref 0–6)
GFR AFRICAN AMERICAN: >60
GFR NON-AFRICAN AMERICAN: >60 ML/MIN/1.73
GLUCOSE BLD-MCNC: 227 MG/DL (ref 74–99)
HCT VFR BLD CALC: 40.6 % (ref 34–48)
HEMOGLOBIN: 12.8 G/DL (ref 11.5–15.5)
IMMATURE GRANULOCYTES #: 0.04 E9/L
IMMATURE GRANULOCYTES %: 0.4 % (ref 0–5)
LYMPHOCYTES ABSOLUTE: 1.23 E9/L (ref 1.5–4)
LYMPHOCYTES RELATIVE PERCENT: 12.1 % (ref 20–42)
MCH RBC QN AUTO: 30.7 PG (ref 26–35)
MCHC RBC AUTO-ENTMCNC: 31.5 % (ref 32–34.5)
MCV RBC AUTO: 97.4 FL (ref 80–99.9)
METER GLUCOSE: 142 MG/DL (ref 74–99)
METER GLUCOSE: 211 MG/DL (ref 74–99)
MONOCYTES ABSOLUTE: 0.61 E9/L (ref 0.1–0.95)
MONOCYTES RELATIVE PERCENT: 6 % (ref 2–12)
NEUTROPHILS ABSOLUTE: 8.26 E9/L (ref 1.8–7.3)
NEUTROPHILS RELATIVE PERCENT: 81 % (ref 43–80)
PDW BLD-RTO: 14.3 FL (ref 11.5–15)
PLATELET # BLD: 269 E9/L (ref 130–450)
PMV BLD AUTO: 10.6 FL (ref 7–12)
POTASSIUM REFLEX MAGNESIUM: 4.5 MMOL/L (ref 3.5–5)
RBC # BLD: 4.17 E12/L (ref 3.5–5.5)
SODIUM BLD-SCNC: 135 MMOL/L (ref 132–146)
WBC # BLD: 10.2 E9/L (ref 4.5–11.5)

## 2021-06-16 PROCEDURE — 94660 CPAP INITIATION&MGMT: CPT

## 2021-06-16 PROCEDURE — 6370000000 HC RX 637 (ALT 250 FOR IP): Performed by: FAMILY MEDICINE

## 2021-06-16 PROCEDURE — 94640 AIRWAY INHALATION TREATMENT: CPT

## 2021-06-16 PROCEDURE — 6370000000 HC RX 637 (ALT 250 FOR IP): Performed by: INTERNAL MEDICINE

## 2021-06-16 PROCEDURE — 99232 SBSQ HOSP IP/OBS MODERATE 35: CPT | Performed by: INTERNAL MEDICINE

## 2021-06-16 PROCEDURE — 6370000000 HC RX 637 (ALT 250 FOR IP): Performed by: STUDENT IN AN ORGANIZED HEALTH CARE EDUCATION/TRAINING PROGRAM

## 2021-06-16 PROCEDURE — 6360000002 HC RX W HCPCS: Performed by: CLINICAL NURSE SPECIALIST

## 2021-06-16 PROCEDURE — 85025 COMPLETE CBC W/AUTO DIFF WBC: CPT

## 2021-06-16 PROCEDURE — 97535 SELF CARE MNGMENT TRAINING: CPT

## 2021-06-16 PROCEDURE — 82962 GLUCOSE BLOOD TEST: CPT

## 2021-06-16 PROCEDURE — 6360000002 HC RX W HCPCS: Performed by: FAMILY MEDICINE

## 2021-06-16 PROCEDURE — 99239 HOSP IP/OBS DSCHRG MGMT >30: CPT | Performed by: STUDENT IN AN ORGANIZED HEALTH CARE EDUCATION/TRAINING PROGRAM

## 2021-06-16 PROCEDURE — 6370000000 HC RX 637 (ALT 250 FOR IP): Performed by: CLINICAL NURSE SPECIALIST

## 2021-06-16 PROCEDURE — 80048 BASIC METABOLIC PNL TOTAL CA: CPT

## 2021-06-16 PROCEDURE — 36415 COLL VENOUS BLD VENIPUNCTURE: CPT

## 2021-06-16 PROCEDURE — 2580000003 HC RX 258: Performed by: FAMILY MEDICINE

## 2021-06-16 RX ORDER — GUAIFENESIN 400 MG/1
400 TABLET ORAL 4 TIMES DAILY PRN
Qty: 20 TABLET | Refills: 0 | Status: SHIPPED | OUTPATIENT
Start: 2021-06-16 | End: 2021-07-14 | Stop reason: SDUPTHER

## 2021-06-16 RX ORDER — DOXYCYCLINE HYCLATE 100 MG/1
100 CAPSULE ORAL EVERY 12 HOURS SCHEDULED
Qty: 6 CAPSULE | Refills: 0 | Status: SHIPPED | OUTPATIENT
Start: 2021-06-16 | End: 2021-06-19

## 2021-06-16 RX ORDER — TRAMADOL HYDROCHLORIDE 50 MG/1
50 TABLET ORAL EVERY 8 HOURS PRN
Qty: 9 TABLET | Refills: 0 | Status: SHIPPED | OUTPATIENT
Start: 2021-06-16 | End: 2021-06-19

## 2021-06-16 RX ORDER — CARVEDILOL 25 MG/1
25 TABLET ORAL 2 TIMES DAILY WITH MEALS
Qty: 60 TABLET | Refills: 3 | Status: SHIPPED
Start: 2021-06-16 | End: 2021-09-15 | Stop reason: SDUPTHER

## 2021-06-16 RX ORDER — HYDRALAZINE HYDROCHLORIDE 50 MG/1
50 TABLET, FILM COATED ORAL EVERY 8 HOURS
Qty: 90 TABLET | Refills: 3 | Status: ON HOLD
Start: 2021-06-16 | End: 2021-07-21 | Stop reason: HOSPADM

## 2021-06-16 RX ORDER — PREDNISONE 10 MG/1
TABLET ORAL
Qty: 18 TABLET | Refills: 0 | Status: SHIPPED | OUTPATIENT
Start: 2021-06-17 | End: 2021-06-26

## 2021-06-16 RX ADMIN — ARFORMOTEROL TARTRATE 15 MCG: 15 SOLUTION RESPIRATORY (INHALATION) at 09:51

## 2021-06-16 RX ADMIN — PREDNISONE 30 MG: 5 TABLET ORAL at 09:11

## 2021-06-16 RX ADMIN — ASPIRIN 81 MG: 81 TABLET, CHEWABLE ORAL at 09:12

## 2021-06-16 RX ADMIN — FLUTICASONE PROPIONATE 1 SPRAY: 50 SPRAY, METERED NASAL at 09:11

## 2021-06-16 RX ADMIN — METOCLOPRAMIDE 5 MG: 10 TABLET ORAL at 13:38

## 2021-06-16 RX ADMIN — DOXYCYCLINE HYCLATE 100 MG: 100 CAPSULE ORAL at 09:12

## 2021-06-16 RX ADMIN — HYDRALAZINE HYDROCHLORIDE 50 MG: 50 TABLET ORAL at 09:12

## 2021-06-16 RX ADMIN — BUDESONIDE 500 MCG: 0.5 SUSPENSION RESPIRATORY (INHALATION) at 09:51

## 2021-06-16 RX ADMIN — GABAPENTIN 100 MG: 100 CAPSULE ORAL at 09:12

## 2021-06-16 RX ADMIN — DULOXETINE HYDROCHLORIDE 60 MG: 60 CAPSULE, DELAYED RELEASE ORAL at 09:13

## 2021-06-16 RX ADMIN — CETIRIZINE HYDROCHLORIDE 10 MG: 10 TABLET, FILM COATED ORAL at 09:12

## 2021-06-16 RX ADMIN — ENOXAPARIN SODIUM 40 MG: 40 INJECTION SUBCUTANEOUS at 09:11

## 2021-06-16 RX ADMIN — BUMETANIDE 1 MG: 1 TABLET ORAL at 09:12

## 2021-06-16 RX ADMIN — DOCUSATE SODIUM 100 MG: 100 CAPSULE, LIQUID FILLED ORAL at 09:13

## 2021-06-16 RX ADMIN — SPIRONOLACTONE 25 MG: 25 TABLET ORAL at 09:13

## 2021-06-16 RX ADMIN — IPRATROPIUM BROMIDE AND ALBUTEROL SULFATE 1 AMPULE: .5; 3 SOLUTION RESPIRATORY (INHALATION) at 09:50

## 2021-06-16 RX ADMIN — ACETAMINOPHEN 650 MG: 325 TABLET ORAL at 11:20

## 2021-06-16 RX ADMIN — METOCLOPRAMIDE 5 MG: 10 TABLET ORAL at 09:12

## 2021-06-16 RX ADMIN — CARVEDILOL 25 MG: 25 TABLET, FILM COATED ORAL at 09:12

## 2021-06-16 RX ADMIN — BACLOFEN 10 MG: 10 TABLET ORAL at 09:12

## 2021-06-16 RX ADMIN — MICONAZOLE NITRATE: 2 POWDER TOPICAL at 09:11

## 2021-06-16 RX ADMIN — SACUBITRIL AND VALSARTAN 1 TABLET: 49; 51 TABLET, FILM COATED ORAL at 09:12

## 2021-06-16 RX ADMIN — ISOSORBIDE MONONITRATE 60 MG: 60 TABLET, EXTENDED RELEASE ORAL at 09:12

## 2021-06-16 RX ADMIN — INSULIN LISPRO 2 UNITS: 100 INJECTION, SOLUTION INTRAVENOUS; SUBCUTANEOUS at 06:29

## 2021-06-16 RX ADMIN — BACLOFEN 10 MG: 10 TABLET ORAL at 13:38

## 2021-06-16 RX ADMIN — INSULIN LISPRO 4 UNITS: 100 INJECTION, SOLUTION INTRAVENOUS; SUBCUTANEOUS at 11:09

## 2021-06-16 RX ADMIN — HYDRALAZINE HYDROCHLORIDE 50 MG: 50 TABLET ORAL at 01:00

## 2021-06-16 RX ADMIN — Medication 10 ML: at 09:13

## 2021-06-16 RX ADMIN — TROSPIUM CHLORIDE 20 MG: 20 TABLET, FILM COATED ORAL at 09:12

## 2021-06-16 ASSESSMENT — PAIN SCALES - GENERAL
PAINLEVEL_OUTOF10: 0
PAINLEVEL_OUTOF10: 3
PAINLEVEL_OUTOF10: 0

## 2021-06-16 NOTE — PROGRESS NOTES
Comprehensive Nutrition Assessment    Type and Reason for Visit:  RD Nutrition Re-Screen/LOS, Initial    Nutrition Recommendations/Plan: Continue current diet to meet nutritional needs. Nutrition Assessment:  Pt adm for Acute respiratory failure with hypoxia. Pt has Pneumonia of both lungs due to infectious organism. Pt on 4L oxygen. PMHx of Asthma, COPD,cerebral infarct, PVD, CHF, asthma, MI, DAYAN,  peripheral vascular disease and Covid 4/2021 post-COVID Parenchymal Scarring/Fibrosis with Superimposed Parenchymal Bronchiolitis. Malnutrition Assessment:  Malnutrition Status:  No malnutrition    Context:  Chronic Illness     Findings of the 6 clinical characteristics of malnutrition:  Energy Intake:  Mild decrease in energy intake (Comment) (Pt stated prior to adm poor PO and appetite ~2wks)  Weight Loss:  No significant weight loss     Body Fat Loss:  No significant body fat loss     Muscle Mass Loss:  No significant muscle mass loss    Fluid Accumulation:  No significant fluid accumulation     Strength:  Not Performed    Estimated Daily Nutrient Needs:  Energy (kcal):  7049-8890 (MSJx1. 2SF); Weight Used for Energy Requirements:  Current     Protein (g):  100-115g (2.0-2.3g/kg IBW); Weight Used for Protein Requirements:  Ideal        Fluid (ml/day):  2000ml fluid restriction; Method Used for Fluid Requirements:  1 ml/kcal      Nutrition Related Findings:  A&Ox4, BS present,N&V, Edema Trace BLE, -I/Os      Wounds:  None       Current Nutrition Therapies:    ADULT DIET; Regular; 3 carb choices (45 gm/meal); Low Sodium (2 gm);  Low Potassium (Less than 3000 mg/day); 2000 ml    Anthropometric Measures:  · Height: 5' 2\" (157.5 cm)  · Current Body Weight: 229 lb (103.9 kg) (6/13)   · Admission Body Weight: 230 lb 14 oz (104.7 kg) (6/10 bedscale)    · Usual Body Weight: 217 lb 9 oz (98.7 kg) (3/12 actual)     · Ideal Body Weight: 110 lbs; % Ideal Body Weight 208.2 %   · BMI: 41.9      · BMI Categories: Obese Class 3 (BMI 40.0 or greater)       Nutrition Diagnosis:   · Increased nutrient needs related to impaired respiratory function (COPD) as evidenced by intake 51-75%, poor intake prior to admission    Nutrition Interventions:   Food and/or Nutrient Delivery:  Continue Current Diet (Pt declined ONS)  Nutrition Education/Counseling:  No recommendation at this time   Coordination of Nutrition Care:  Continue to monitor while inpatient    Goals:  >75% of meals consumed       Nutrition Monitoring and Evaluation:   Behavioral-Environmental Outcomes:  None Identified   Food/Nutrient Intake Outcomes:  Food and Nutrient Intake  Physical Signs/Symptoms Outcomes:  Biochemical Data, Fluid Status or Edema, Nutrition Focused Physical Findings, Skin, Weight, Nausea or Vomiting     Discharge Planning:     Too soon to determine     Electronically signed by Jud Callaway RD, LD on 6/16/21 at 9:31 AM EDT    Contact: [phone]

## 2021-06-16 NOTE — PROGRESS NOTES
OUTPATIENT CARDIOLOGY FOLLOW-UP    Name: Adelia Jones    Age: 76 y.o. Primary Care Physician: Gabby Marino DO    Date of Service: 6/16/2021    Chief Complaint:   Chief Complaint   Patient presents with    Cough     hypoxic at [facility] office, covid in april    Shortness of Breath       Interim History:  Mrs. Ute Lorenzo is a 58-year-old  female with history of for coronary artery disease underwent CABG in 2011 and history of severe LV dysfunction with an EF of 25% based on echocardiogram done in 2013, cardiac cath in 2013 showed no progression of disease, chronic right bundle-branch block, type II diabetes, hypertension, hyperlipidemia, morbid obesity, active tobacco use still smoking about 10 cigarettes a day, marijuana use, obstructive sleep apnea on CPAP and history of severe peripheral vascular disease came for follow-up visit. She was last seen in the office was on 9/17/2020, Since her last evaluation, she progressed from 4/19/2021 to 4/25/2021 with a COVID-19 pneumonia radiotherapy steroids and antibiotics. .  She underwent left popliteal and tibial trunk atherectomy on 11/13/2018 and was initiated on plavix and aspirin. She was discharged home on oxygen and she has been using mainly at nighttime. She denies any chest pain, increased dyspnea or leg edema. She is still smoking about 5 cigarettes a day and does not want to quit. She told me her blood pressure sometimes running high at home. She has been using wrist blood pressure machine. She was was recommended to take Coreg 12.5 mg twice daily but she has been taking 25 mg in the evening and 50 mg in the morning. Today, blood pressure improved and well controlled. She told me she is going home today. No new cardiac symptoms. She is complaining of headache mostly behind her eyes and was also diagnosed with a glaucoma. She denies any more nausea, abdominal pain or diarrhea. She denies any fever.   No new cardiac symptoms and gets dyspnea with exertion. SR on EKG.     Review of Systems:   Cardiac: As per HPI  General: No fever, chills  Pulmonary: As per HPI  HEENT: No visual disturbances, difficult swallowing  GI: No nausea, vomiting  Endocrine: No thyroid disease or DM  Musculoskeletal: PEDROZA x 4, no focal motor deficits  Skin: Intact, no rashes  Neuro/Psych: No headache or seizures    Past Medical History:  Past Medical History:   Diagnosis Date    Asthma     Atherosclerosis of native artery of left leg with rest pain ([address] 11/9/2018    C. difficile diarrhea 2015    CAD (coronary artery disease) 2011    Cellulitis and abscess of trunk 4/14/2015    Cerebellar infarct ([address] 4/3/2019    Remote, rt. cerebellum, head CT scan, 1/9/19    Chronic back pain     Chronic kidney disease     Chronic systolic congestive heart failure ([address] 10/4/2013    Chronic venous insufficiency 10/11/2017    COPD (chronic obstructive pulmonary disease) ([address]     Depression     Diabetes mellitus ([address]     Diabetic neuropathy ([address]     Diverticulosis     Fatty liver 1/8/2016    per US    Glaucoma, open angle 2/1/2016    Mild-OU    Hepatic encephalopathy ([address] 02/07/2016    resolved    Hiatal hernia     History of blood transfusion     Hyperlipidemia     Hyperplastic colon polyp     Hypertension     Incontinence     Liver mass     Morbid obesity ([address]     Movement disorder     Myocardial infarction ([address] 2011    Osteoarthritis, generalized     Pain in both lower legs 10/11/2017    Peripheral vascular disease ([address]     Pneumonia 1/26/2014    Pulmonary edema     resolved    PVD (peripheral vascular disease) with claudication ([address] 8/30/2017    Sleep apnea     uses BI PAP    Status post peripheral artery angioplasty 10/31/2019    Tobacco abuse     Tobacco abuse 10/11/2017    Tubular adenoma polyp of rectum     Urinary tract infection due to ESBL Klebsiella 03/08/2017    Ventral hernia        Past Surgical History:  Past Surgical History:   Procedure Laterality Date    ANGIOPLASTY  11/13/2018    Dr. Dawn Ted & athrectomy L SFA & Popliteal    BREAST SURGERY  2000    bilateral reduction    BRONCHIAL BRUSH BIOPSY  1/25/2013    Dr Edy Morris  04/20/2015    Dr. Odessa Cobb  12/2011     DR. Maria G Pike,  follows Dr Kishan Jonas  11/15/2013    Dr Kadeem Paniagua    COLONOSCOPY  12/23/2016    Dr Soler Left adenoma & hyperplastic polyps, melanosis coli (repeat one year 12/2017)    CORONARY ARTERY BYPASS GRAFT      ECHO COMPLETE  10/9/2013         ENDOSCOPY, COLON, DIAGNOSTIC  04/18/2017    GALLBLADDER SURGERY      gallstones removed, [facility] 8/2017    HYSTERECTOMY      JOINT REPLACEMENT  2011    LEFT KNEE    KNEE SURGERY      left knee replacement    LAYER WOUND CLOSURE Left [id number]   [employer] LIVER BIOPSY  1/8/2016    [facility]'s    POLYSOMNOGRAPHY  1/2016    [facility]    UPPER GASTROINTESTINAL ENDOSCOPY  12/20/12    UPPER GASTROINTESTINAL ENDOSCOPY  12/23/2016    Dr Real Murcia UPPER GASTROINTESTINAL ENDOSCOPY  02/08/2017       Family History:  Family History   Problem Relation Age of Onset    Cancer Mother     Asthma Father        Social History:  Social History     Socioeconomic History    Marital status:      Spouse name: Not on file    Number of children: 2    Years of education: Not on file    Highest education level: Not on file   Occupational History    Occupation: disability   Tobacco Use    Smoking status: Current Every Day Smoker     Packs/day: 0.25     Years: 40.00     Pack years: 10.00     Types: Cigarettes     Start date: 8/10/1974    Smokeless tobacco: Never Used    Tobacco comment: 1 pack every 3 days (6-7 cig)   Vaping Use    Vaping Use: Never used   Substance and Sexual Activity    Alcohol use:  Yes     Alcohol/week: 0.0 standard drinks     Comment: social    Drug use: Yes     Types: Marijuana     Comment: \"every 4th or 5th day out of the week\"    Sexual activity: Never   Other Topics Concern    Not on file   Social History Narrative    Pt lives with brother in her house-pt has never driven a car and depends on transportation     Social Determinants of Health     Financial Resource Strain:     Difficulty of Paying Living Expenses:    Food Insecurity:     Worried About Running Out of Food in the Last Year:     [address] in the Last Year:    Transportation Needs:     Lack of Transportation (Medical):  Lack of Transportation (Non-Medical):    Physical Activity:     Days of Exercise per Week:     Minutes of Exercise per Session:    Stress:     Feeling of Stress :    Social Connections:     Frequency of Communication with Friends and Family:     Frequency of Social Gatherings with Friends and Family:     Attends [employer] Services:     Active Member of Clubs or Organizations:     Attends Club or Organization Meetings:     Marital Status:    Intimate Partner Violence:     Fear of Current or Ex-Partner:     Emotionally Abused:     Physically Abused:     Sexually Abused: Allergies:   Allergies   Allergen Reactions    Latex Hives    Bee Venom Anaphylaxis    Dilaudid [Hydromorphone Hcl] Itching    Dye [Iodides] Hives and Shortness Of Breath    Percocet [Oxycodone-Acetaminophen] Shortness Of Breath and Itching    Keflex [Cephalexin] Itching and Rash    Lasix [Furosemide] Other (See Comments)     Pt states she cramps up and gets headaches    Levaquin [Levofloxacin In D5w] Hives    Lipitor      MUSCLE SPASMS    Lyrica [Pregabalin]      Dream disturbances    Morphine Hives    Naproxen      Unsure of reaction;pt states able to take Aleve without difficulties    Nefazodone Other (See Comments)    Norvasc [Amlodipine] Swelling    Oxycodone-Acetaminophen Swelling    Shellfish-Derived Products     Trazodone And Nefazodone        Current Medications:  Current Facility-Administered Medications Medication Dose Route Frequency Provider Last Rate Last Admin    trimethobenzamide (TIGAN) injection 200 mg  200 mg Intramuscular Q6H PRN Juan Luis Diane MD   200 mg at 06/15/21 0838    sacubitril-valsartan (ENTRESTO) 49-51 MG per tablet 1 tablet  1 tablet Oral BID Radha Cunha MD   1 tablet at 06/16/21 0912    hydrALAZINE (APRESOLINE) injection 10 mg  10 mg Intravenous Q6H PRN Juan Luis Diane MD   10 mg at 06/14/21 2152    baclofen (LIORESAL) tablet 10 mg  10 mg Oral TID Juan Luis Diane MD   10 mg at 06/16/21 0912    doxycycline hyclate (VIBRAMYCIN) capsule 100 mg  100 mg Oral 2 times per day Kathie Benito MD   100 mg at 06/16/21 0912    bumetanide (BUMEX) tablet 1 mg  1 mg Oral Daily Juan Luis Diane MD   1 mg at 06/16/21 0912    docusate sodium (COLACE) capsule 100 mg  100 mg Oral BID Juan Luis Diane MD   100 mg at 06/16/21 0913    bisacodyl (DULCOLAX) suppository 10 mg  10 mg Rectal Daily PRN Juan Luis Diane MD        predniSONE (DELTASONE) tablet 30 mg  30 mg Oral Daily PATRIZIA Asher - CNS   30 mg at 06/16/21 0911    Followed by   Stephanie Mccray ON 6/18/2021] predniSONE (DELTASONE) tablet 20 mg  20 mg Oral Daily PATRIZIA Asher - CNS        Followed by   Stephanie Mccray ON 6/21/2021] predniSONE (DELTASONE) tablet 10 mg  10 mg Oral Daily [facility], APRN - CNS        Arformoterol Tartrate (BROVANA) nebulizer solution 15 mcg  15 mcg Nebulization BID Huntley GiffordPATRIZIA - CNS   15 mcg at 06/16/21 0951    budesonide (PULMICORT) nebulizer suspension 500 mcg  0.5 mg Nebulization BID [facility]   500 mcg at 06/16/21 1024    aspirin chewable tablet 81 mg  81 mg Oral Daily Jaylon Das MD   81 mg at 06/16/21 0912    carvedilol (COREG) tablet 25 mg  25 mg Oral BID  Jaylon Das MD   25 mg at 06/16/21 0912    cetirizine (ZYRTEC) tablet 10 mg  10 mg Oral Daily Jaylon Das MD   10 mg at 06/16/21 0912    DULoxetine (CYMBALTA) extended release capsule 60 mg  60 mg Oral BID Jaylon Das MD   60 mg at 06/16/21 0913    fluticasone (FLONASE) 50 MCG/ACT nasal spray 1 spray  1 spray Each Nostril Daily Wali Weinstein MD   1 spray at 06/16/21 0911    gabapentin (NEURONTIN) capsule 100 mg  100 mg Oral BID Wali Weinstein MD   100 mg at 06/16/21 0912    hydrALAZINE (APRESOLINE) tablet 50 mg  50 mg Oral Q8H Wali Weinstein MD   50 mg at 06/16/21 0912    sodium chloride flush 0.9 % injection 5-40 mL  5-40 mL Intravenous 2 times per day Wali Weinstein MD   10 mL at 06/16/21 0913    sodium chloride flush 0.9 % injection 5-40 mL  5-40 mL Intravenous PRN Wali Weinstein MD   10 mL at 06/11/21 1202    0.9 % sodium chloride infusion  25 mL Intravenous PRN Wali Weinstein MD        enoxaparin (LOVENOX) injection 40 mg  40 mg Subcutaneous Daily Wali Weinstein MD   40 mg at 06/16/21 0911    polyethylene glycol (GLYCOLAX) packet 17 g  17 g Oral Daily PRN Wali Weinstein MD   17 g at 06/14/21 1034    acetaminophen (TYLENOL) tablet 650 mg  650 mg Oral Q6H PRN Wali Weinstein MD   650 mg at 06/16/21 1120    Or    acetaminophen (TYLENOL) suppository 650 mg  650 mg Rectal Q6H PRN Wali Weinstein MD        guaiFENesin tablet 400 mg  400 mg Oral 4x Daily PRN Wali Weinstein MD   400 mg at 06/13/21 0902    insulin lispro (HUMALOG) injection vial 0-12 Units  0-12 Units Subcutaneous TID WC Wali Weinstein MD   4 Units at 06/16/21 1109    insulin lispro (HUMALOG) injection vial 0-6 Units  0-6 Units Subcutaneous Nightly Wali Weinstein MD   4 Units at 06/15/21 2202    glucose (GLUTOSE) 40 % oral gel 15 g  15 g Oral PRN Wali Weinstein MD        dextrose 50 % IV solution  12.5 g Intravenous PRN Wali Weinstein MD        glucagon (rDNA) injection 1 mg  1 mg Intramuscular PRN Wali Weinstein MD        dextrose 5 % solution  100 mL/hr Intravenous PRN Wali Weinstein MD        isosorbide mononitrate (IMDUR) extended release tablet 60 mg  60 mg Oral Daily Wali Weinsteni MD   60 mg at 06/16/21 0912    metoclopramide (REGLAN) tablet 5 mg  5 mg Oral TID Asya Astudillo MD   5 mg at 06/16/21 0912    trospium (SANCTURA) tablet 20 mg  20 mg Oral BID AC Asya Astudillo MD   20 mg at 06/16/21 0912    montelukast (SINGULAIR) tablet 10 mg  10 mg Oral Nightly Asya Astudillo MD   10 mg at 06/15/21 2159    spironolactone (ALDACTONE) tablet 25 mg  25 mg Oral Daily Asya Astudillo MD   25 mg at 06/16/21 0913    traZODone (DESYREL) tablet 100 mg  100 mg Oral Nightly Asya Astudillo MD   100 mg at 06/15/21 2159    simvastatin (ZOCOR) tablet 20 mg  20 mg Oral Nightly Asya Astudillo MD        albuterol (PROVENTIL) nebulizer solution 2.5 mg  2.5 mg Nebulization Q2H PRN Asya Astudillo MD        miconazole (MICOTIN) 2 % powder   Topical BID Cecille Can MD   Given at 06/16/21 0911    ipratropium-albuterol (DUONEB) nebulizer solution 1 ampule  1 ampule Inhalation Q4H WA Cecille Can MD   1 ampule at 06/16/21 0950       Physical Exam:  /68   Pulse 83   Temp 97.7 °F (36.5 °C) (Axillary)   Resp 16   Ht 5' 2\" (1.575 m)   Wt 229 lb (103.9 kg)   LMP 01/01/1990   SpO2 93%   BMI 41.88 kg/m²   Wt Readings from Last 3 Encounters:   06/13/21 229 lb (103.9 kg)   05/20/21 219 lb (99.3 kg)   05/19/21 220 lb (99.8 kg)     Appearance: Awake, alert and oriented x 3, no acute respiratory distress she is morbidly obese. Skin: Intact, no rash  Head: Normocephalic, atraumatic  Eyes: EOMI, no conjunctival erythema  ENMT: No pharyngeal erythema, MMM, no rhinorrhea  Neck: Supple, no elevated JVP, no carotid bruits  Lungs: Clear to auscultation bilaterally. No wheezes, rales, or rhonchi.   Cardiac: Regular rate and rhythm, +S1S2, no murmurs apparent  Abdomen: Soft, nontender, +bowel sounds  Extremities: Moves all extremities x 4, no lower extremity edema  Neurologic: No focal motor deficits apparent, normal mood and affect, alert and oriented x 3  Peripheral Pulses: Intact posterior tibial pulses bilaterally    Laboratory Tests:  Lab Results Component Value Date    CREATININE 1.1 (H) 06/16/2021    BUN 27 (H) 06/16/2021     06/16/2021    K 4.5 06/16/2021    CL 94 (L) 06/16/2021    CO2 33 (H) 06/16/2021     Lab Results   Component Value Date    MG 2.0 04/13/2019     Lab Results   Component Value Date    WBC 10.2 06/16/2021    HGB 12.8 06/16/2021    HCT 40.6 06/16/2021    MCV 97.4 06/16/2021     06/16/2021     Lab Results   Component Value Date    ALT 17 06/09/2021    AST 11 06/09/2021    ALKPHOS 74 06/09/2021    BILITOT 0.3 06/09/2021     Lab Results   Component Value Date    CKTOTAL 88 01/05/2016    CKMB 1.2 01/05/2016    TROPONINI <0.01 04/19/2021    TROPONINI <0.01 03/12/2021    TROPONINI <0.01 09/19/2019     Lab Results   Component Value Date    INR 1.1 04/19/2021    INR 1.0 01/04/2019    INR 1.1 11/13/2018    PROTIME 12.6 (H) 04/19/2021    PROTIME 11.7 01/04/2019    PROTIME 12.3 11/13/2018     Lab Results   Component Value Date    TSH 0.646 02/05/2021     Lab Results   Component Value Date    LABA1C 6.8 (H) 06/11/2021     No results found for: EAG  Lab Results   Component Value Date    CHOL 131 02/05/2021    CHOL 162 06/16/2020    CHOL 222 (H) 06/05/2018     Lab Results   Component Value Date    TRIG 219 (H) 02/05/2021    TRIG 89 06/16/2020    TRIG 84 06/05/2018     Lab Results   Component Value Date    HDL 27 02/05/2021    HDL 46 06/16/2020    HDL 38 06/05/2018     Lab Results   Component Value Date    LDLCALC 60 02/05/2021    LDLCALC 98 06/16/2020    LDLCALC 167 (H) 06/05/2018     Lab Results   Component Value Date    LABVLDL 44 02/05/2021    LABVLDL 18 06/16/2020    LABVLDL 17 06/05/2018     No results found for: CHOLHDLRATIO    Cardiac Tests:  ECG: Normal sinus rhythm, RBBB abnormal ECG    Telemetry-she is off the monitor. Vitals and labs were reviewed: Blood pressure 134/68 heart rate 83, 93 % on room air. Labs-BUN/creatinine 19/1.1>> 27/1.1 potassium 3.9. CBC normal>> NAD    Echocardiogram: 1/4/2017  LVEF 35-45%.  LV diffuse hypokinesis,mild MR  TTE- 6/5/2018:LVEF 65%, moderate LVH, stage II diastolic dysfunction,    JYQ-5/6/8065-ZBEE 65%, moderate LVH, stage II diastolic dysfunction,    Stress test: 6/05/2018:  1. Pharmacologically-induced perfusion defect involving the lateral   wall, without evidence of reversibility. 2. Diffusely hypokinetic left ventricular wall motion with a focal   area of akinesia involving the mid lateral wall. 3. Left ventricular ejection fraction of 42 %       The 10-year ASCVD risk score (Papito Franklin, et al., 2013) is: 33.7%    Values used to calculate the score:      Age: 76 years      Sex: Female      Is Non- : Yes      Diabetic: Yes      Tobacco smoker: Yes      Systolic Blood Pressure: 969 mmHg      Is BP treated: Yes      HDL Cholesterol: 27 mg/dL      Total Cholesterol: 131 mg/dL  [facility]-4/20/2015:  IMPRESSION:   1. Atherosclerotic coronary disease. a. Status post remote bypass grafting. Patent SVG to OM circumflex. b.    Darrick Archer to selectively cannulate left internal mammary artery      graft, but there is no  significant disease in the LAD itself. 2.    Moderate global left ventricular systolic dysfunction. ASSESSMENT:  1. Heart failure with an improved ejection fraction with an EF of 40-45%-->65%  2. ACC/AHA, stage C., NYHA functional class II, now euvolemic. Diastolic dysfunction. Volume status is improved  3. CAD, status post CABG in 2011 LIMA to LAD, SVG to OM 1  4. Morbid obesity with obstructive sleep apnea  5. Hypertension->> hypertensive urgency secondary to combination of prednisone and also from stopping Entresto and spironolactone>> improving. Blood pressure this morning is good  6. Hyperlipidemia on statin  7. Type II diabetes  8. Tobacco and marijuana use, still smoking about 5 to 6 cigarettes a day. 9. PAD, S/p left popliteal and tibial atherectomy/angioplasty -stable  10. Chronic RBBB.   11.  COVID-19 pneumonia, recovered  12. Nausea, vomiting and diarrhea    Plan:   1. She is on guideline directed medical therapy for heart failure with reduced ejection fraction, continue Entresto 49/51 twice a day and Coreg  25 mg p.o. twice daily, hydralazine 50 mg 3 times a day and isosorbide mononitrate 60 mg daily a day and spironolactone 25 mg to her regimen. Continue Bumex to 1 mg po daily. 2. continue rest of the medications including hydralazine 50 mg 3 times a day . 3. She was advised again to stop smoking and told me that she does not want to quit. 4. Blood pressure reasonably controlled. She is stable from cardiology standpoint for discharge. She needs to follow-up with me in the office in 1 month. She was also recommended get a BMP in 1 week. 5. She was advised to follow-up with her primary care provider for further evaluation of her headaches as an outpatient if needed she may need a referral to see a neurologist and also an eye doctor for elevated pressures in her eyes. We will sign off, please call us if you have any further questions or concerns. The patient's current medication list, allergies, problem list and results of all previously ordered testing were reviewed at today's visit.   Chaz Heart MD  [facility]) Cardiology

## 2021-06-16 NOTE — PROGRESS NOTES
Occupational Therapy  OT BEDSIDE TREATMENT NOTE      Date:2021  Patient Name: Jerry Girard  MRN: [id number]  : 1955  Room: [address] [place], [place] [address]A     Evaluating OT: Yetta Bamberger OTR/L [id number]     Referring Provider: Radha Boss MD  Specific Provider Orders/Date: eval and treat 6/10/21     Diagnosis: acute respiratory failure with hypoxia. Pt presents to ED from home with SOB and hypoxia. Pertinent Medical History: asthma, CAD, CHF, COPD, DM, neuropathy, CKD, HTN, OA      Precautions:  fall risk     Assessment of current deficits   [x]? Functional mobility             [x]?ADLs           [x]? Strength                  [x]? Cognition   [x]? Functional transfers           [x]? IADLs         [x]? Safety Awareness   [x]? Endurance   []? Fine Coordination              [x]? Balance      []? Vision/perception   []? Sensation     []? Gross Motor Coordination  []? ROM           []?  Delirium                   []? Motor Control      OT PLAN OF CARE   OT POC based on physician orders, patient diagnosis and results of clinical assessment     Frequency/Duration 2-5 days/wk for 10-14 days PRN   Specific OT Treatment Interventions to include:   * Instruction/training on adapted ADL techniques and AE recommendations to increase functional independence within        precautions  * Training on energy conservation strategies, correct breathing pattern and techniques to improve independence/tolerance for self-care routine  * Functional transfer/mobility training/DME recommendations for increased independence, safety, and fall prevention  * Patient/Family education to increase follow through with safety techniques and functional independence  * Recommendation of environmental modifications for increased safety with functional transfers/mobility and ADLs  * Therapeutic exercise to improve motor endurance, ROM, and functional strength for ADLs/functional transfers  * Therapeutic activities to facilitate/challenge dynamic balance, stand tolerance for increased safety and independence with ADLs     Recommended Adaptive Equipment: TBD     Home Living: Pt lives alone in a single story set up. Bathroom setup: walk in shower with seat and grab bar, handicapped height commode      Prior Level of Function: Per pt report Mod I with toileting and grooming. Pd caregiver 5hr/day to assist in bathing, LB dressing and IADLs; Pt reports she is independent with simple meal prep. completed functional mobility with [employer]. Also has cane PRN. Home O2 at night only.     Pain Level: Pt did not report pain during session.      Cognition: Awake and alert. Min cues for safety. Functional Assessment: AM-PAC Daily Activity Raw Score: 18/24    Initial Eval Status  Date: 6/11/21 Treatment session:   6/16/21 STGs=LTGS  Timeframe 10-14 days      Feeding Independent  Breakfast tray       Grooming SBA  Standing sink level for hand/face hygiene and mouthcare Supervision while standing at sink to brush teeth, comb hair, and wash face.   Mod I   UB Dressing Min A  Donning/doffing hospital gown   Mod I   LB Dressing Min A  Donning/doffing brief Pt instructed with adaptive equipment use for LB dressing. Pt demonstrated use of reacher, shoehorn, and sock aid. Equipment issued to pt this session.   Mod I    Bathing Mod A   SBA   Toileting Mod A  Significant urine incontinence bed level  Completion of toileting at commode  Use of grab bar for support in transfer  Able to manage brief over hips  Assist in cat care SBA   Mod I   Bed Mobility  Supine to sit: SBA       Functional Transfers STS: SBA Supervision from chair and toilet   Mod I   Functional Mobility SBA with [employer]  Household distance  SBA using w/w to and from bathroom. Mod I during ADLs   Balance Sitting: fair plus     Standing: fair at [employer]       Activity Tolerance Fair   O2 throughout session  Cues for pacing  pt reports fatigue with standing.    Standing marlon x6-7 min with fair plus balance during self care tasks       Comments:  Pt states she has not received LB dressing equipment after going to rehab in the past.  Interested in using equipment for LB self care. Pt demonstrated use while seated in the chair. No further questions regarding AE or technique. Remained in chair at end of the session. Education/treatment:  ADL retraining with facilitation of movement to increase self care skills. Therapeutic activity to address balance and endurance for ADL and transfers. Pt education of adaptive equipment training and energy conservation. · Pt has made  progress towards set goals.    ·   Time In: 10:19   Time Out: 10:43     Min Units   Therapeutic Ex [id number]     Therapeutic Activities 91604 5    ADL/Self Care 61362 19 2   Orthotic Management 54748     Neuro Re-Ed 16718     Non-Billable Time     TOTAL TIMED TREATMENT [address]/L [id number]

## 2021-06-16 NOTE — CARE COORDINATION
HAILEE NOTE: Per QFR---Plan is discharge home today. Dr. Shaye tineo on unit & requesting [facility] within 48hrs of discharge home. Portable oxygen tank delivered last pm. Pt now saying she does not have oxygen tubing to use with the tank. CM spoke with pt & Ankit ([facility] RESP MANAGER) to discuss needs. Judge Velazquez states pt has not been at home when his techs have attempted to see pt & check equipment. CM suggested [facility] meet her at home today to check that she has the necessary equipment in her home. Ankit agreeable. Now pt states her ride is coming between 2-3PM. Reports her ride is one of her aides but she is at the [address] [place] [address] her license as it is her BD. States her aide has her house key so she can not get into her home if sent by ambulette. CM left  for daughter to provide update. Daughter lives in [place].

## 2021-06-16 NOTE — CARE COORDINATION
HAILEE NOTE: CM spoke with [facility] after pt offered choices & had no preference. Pt accepted & will be seen within 48hrs. Order placed & pt updated.

## 2021-06-16 NOTE — CARE COORDINATION
CM NOTE: Called to room by pt. Pt states her ride (home aide) is picking her up at 3pm. CM spoke with [facility]. States he will send someone to home today to check equipment. Pt updated & voiced appreciation.

## 2021-06-16 NOTE — DISCHARGE SUMMARY
[facility] Physician Discharge Summary       No follow-up provider specified. Activity level: As tolerated     Dispo: Home with [facility]    Condition on discharge: Stable     Patient ID:  Nuha Hernandez  [id number]  62 y.o.  1955    Admit date: 6/9/2021    Discharge date and time:  6/16/2021  10:06 AM    Admission Diagnoses: Active Problems:    Acute respiratory failure with hypoxia (HCC)    Pneumonia of both lungs due to infectious organism  Resolved Problems:    * No resolved hospital problems. *      Discharge Diagnoses: Active Problems:    Acute respiratory failure with hypoxia (HCC)    Pneumonia of both lungs due to infectious organism  Resolved Problems:    * No resolved hospital problems. *      Consults:  IP CONSULT TO PULMONOLOGY  IP CONSULT TO IV TEAM  IP CONSULT TO CARDIOLOGY  IP CONSULT TO IV TEAM  IP CONSULT TO IV TEAM  IP CONSULT TO DIETITIAN    Procedures: As per hospital    Hospital Course:   Patient Nuha Hernandez is a 76 y.o. presented with Acute respiratory failure with hypoxia ([address] [J96.01]  It was admitted with shortness of breath and managed for Acute on chronic hypoxic and hypercapnic respiratory failure likely secondary to exacerbation of COPD -patient discharged on 4 L which is her baseline at home (uses Trelegy, CPAP 5 nightly at home), on steroids, respiratory support.  Respiratory panel negative.  Procalcitonin less than 0.02.  Pulmonary feels has scarring/fibrosis and superimposed bronchiolitis, post Covid. Patient was off antibiotics, on nebulizations. Discharged on p.o. steroids and doxy po.   Acute on chronic diastolic CHF - continue meds.  2D echo - 6/11 -ejection fraction 55 to 60% with severe concentric LVH, diastolic dysfunction, RVSP 26. On Bumex, Coreg, hydralazine, Imdur, Entresto and spironolactone, resolved. Hypertensive urgency  - uncontrolled Hypertension - home meds -including Entresto and Aldactone and prn hydralazine.   Patient being given 3-day prescription for tramadol for her intractable back pain due to her lumbar stenosis. Discharge Exam:  General Appearance: alert and oriented to person, place and time and in no acute distress  Skin: warm and dry  Head: normocephalic and atraumatic  Eyes: pupils equal, round, and reactive to light, extraocular eye movements intact, conjunctivae normal  Neck: neck supple and non tender without mass   Pulmonary/Chest: clear to auscultation bilaterally- no wheezes, rales or rhonchi, normal air movement, no respiratory distress  Cardiovascular: normal rate, normal S1 and S2 and no carotid bruits  Abdomen: soft, non-tender, non-distended, normal bowel sounds, no masses or organomegaly  Extremities: no cyanosis, no clubbing and no edema  Neurologic: no cranial nerve deficit and speech normal    I/O last 3 completed shifts:  In: -   Out: 400 [Urine:400]  No intake/output data recorded. LABS:  Recent Labs     06/14/21  0820 06/15/21  0725 06/16/21  0320    137 135   K 4.7 4.9 4.5   CL 97* 94* 94*   CO2 37* 33* 33*   BUN 22 23 27*   CREATININE 1.0 1.0 1.1*   GLUCOSE 143* 159* 227*   CALCIUM 9.6 9.7 9.1       Recent Labs     06/14/21  0820 06/15/21  0725 06/16/21  0320   WBC 9.8 10.1 10.2   RBC 4.59 4.37 4.17   HGB 13.6 13.2 12.8   HCT 45.2 42.9 40.6   MCV 98.5 98.2 97.4   MCH 29.6 30.2 30.7   MCHC 30.1* 30.8* 31.5*   RDW 13.9 14.3 14.3    278 269   MPV 10.1 10.7 10.6       No results for input(s): POCGLU in the last 72 hours. Imaging:  XR CHEST PORTABLE    Result Date: 6/9/2021  EXAMINATION: ONE XRAY VIEW OF THE CHEST 6/9/2021 2:53 pm COMPARISON: 19 April 2021 HISTORY: ORDERING SYSTEM PROVIDED HISTORY: hypoxia TECHNOLOGIST PROVIDED HISTORY: Reason for exam:->hypoxia FINDINGS: Prior median sternotomy. Heart is enlarged. Pulmonary vascularity appears mildly congested. Airspace disease on the left appears diminished. Airspace disease on the right may be minimally progressive.   Neither costophrenic angle is blunted. Airspace disease with interval improvement on the left and probable slight progression on the right. Whether this relates to changing pneumonia or superimposed pulmonary edema is less than certain. CTA PULMONARY W CONTRAST    Result Date: 6/9/2021  EXAMINATION: CTA OF THE CHEST 6/9/2021 5:42 pm TECHNIQUE: CTA of the chest was performed after the administration of intravenous contrast.  Multiplanar reformatted images are provided for review. MIP images are provided for review. Dose modulation, iterative reconstruction, and/or weight based adjustment of the mA/kV was utilized to reduce the radiation dose to as low as reasonably achievable. COMPARISON: April 19 spine HISTORY: ORDERING SYSTEM PROVIDED HISTORY: recent covid in april, hypoxic, r/o PE TECHNOLOGIST PROVIDED HISTORY: Reason for exam:->recent covid in april, hypoxic, r/o PE Decision Support Exception - unselect if not a suspected or confirmed emergency medical condition->Emergency Medical Condition (MA) FINDINGS: Pulmonary Arteries: Pulmonary arteries are adequately opacified for evaluation. No evidence of intraluminal filling defect to suggest pulmonary embolism. Main pulmonary artery is normal in caliber. Mediastinum: Nonspecific hilar and mediastinal nodes, which may be reactive. Cardiomegaly. No pericardial effusion. Lungs/pleura: Multifocal ground-glass and interstitial airspace disease in the lungs bilaterally, less extensive than on the prior examination and most consistent with multifocal pneumonia. Small bilateral pleural effusions, slightly larger on the right. . Images degraded by respiratory motion artifact. Upper Abdomen: Limited images of the upper abdomen are unremarkable. Soft Tissues/Bones:  status post median sternotomy. No acute bony pathology. No evidence of pulmonary emboli.  Multifocal ground-glass and interstitial airspace disease in the lungs bilaterally, less extensive than on the prior examination and most consistent with multifocal pneumonia. Small bilateral pleural effusions, slightly larger on the right. Nonspecific hilar and mediastinal nodes, which may be reactive. Cardiomegaly. Patient Instructions:      Medication List      START taking these medications    doxycycline hyclate 100 MG capsule  Commonly known as: VIBRAMYCIN  Take 1 capsule by mouth every 12 hours for 3 days     guaiFENesin 400 MG tablet  Take 1 tablet by mouth 4 times daily as needed for Cough     miconazole 2 % powder  Commonly known as: MICOTIN  Apply topically 2 times daily. predniSONE 10 MG tablet  Commonly known as: DELTASONE  Take 3 tablets by mouth daily for 3 days, THEN 2 tablets daily for 3 days, THEN 1 tablet daily for 3 days. Start taking on: June 17, 2021     sacubitril-valsartan 49-51 MG per tablet  Commonly known as: ENTRESTO  Take 1 tablet by mouth 2 times daily  Replaces: Entresto 24-26 MG per tablet     traMADol 50 MG tablet  Commonly known as: Ultram  Take 1 tablet by mouth every 8 hours as needed for Pain for up to 3 days. Intended supply: 3 days. Take lowest dose possible to manage pain        CHANGE how you take these medications    baclofen 10 MG tablet  Commonly known as: LIORESAL  TAKE 1 TABLET BY MOUTH TWICE A DAY AS NEEDED  What changed: See the new instructions. carvedilol 25 MG tablet  Commonly known as: COREG  Take 1 tablet by mouth 2 times daily (with meals)  What changed:   · medication strength  · how much to take     hydrALAZINE 50 MG tablet  Commonly known as: APRESOLINE  Take 1 tablet by mouth every 8 hours  What changed: See the new instructions.         CONTINUE taking these medications    Accu-Chek FastClix Lancets Misc  Use 4x daily     Accu-Chek Guide Me w/Device Kit  Use as directed     Accu-Chek Guide strip  Generic drug: blood glucose test strips  1 each by In Vitro route 3 times daily     aspirin 81 MG chewable tablet  Commonly known as: Aspirin Low Dose  TAKE 1 TABLET BY MOUTH EVERY DAY     azelastine 0.1 % nasal spray  Commonly known as: ASTELIN  1 spray by Nasal route 2 times daily Use in each nostril as directed     B-D ASSURE BPM/AUTO WRIST CUFF Misc  Use daily     BiPAP Machine Misc     bumetanide 1 MG tablet  Commonly known as: BUMEX  TAKE (1) TABLET BY MOUTH DAILY     cetirizine 10 MG tablet  Commonly known as: ZYRTEC  TAKE 1 TABLET BY MOUTH EVERY DAY     [employer] Alcohol Prep Swabs 70 % Pads  Use daily     docusate sodium 100 MG capsule  Commonly known as: COLACE     DULoxetine 60 MG extended release capsule  Commonly known as: CYMBALTA  TAKE ONE (1) CAPSULE BY MOUTH TWICE DAILY     fluticasone 50 MCG/ACT nasal spray  Commonly known as: FLONASE  USE 1 SPRAY IN EACH NOSTRIL TWICE A DAY     gabapentin 100 MG capsule  Commonly known as: NEURONTIN  Take 1 capsule by mouth 2 times daily for 180 days.  Intended supply: 30 days     Handicap Placard Misc  by Does not apply route Patient cannot walk 200 ft without stopping to rest.    Expiration 8/2022     insulin lispro (1 Unit Dial) 100 UNIT/ML Sopn  Commonly known as: HumaLOG KwikPen  Use sliding scale as below TID with meals Sugar  0 units Sugar 131-180 2 units Sugar 181-240 4 units Sugar 241-300 6 units Sugar 301-350 8 units Sugar 351-400 10 units Sugar > 400 12 units Call above 500     isosorbide mononitrate 60 MG extended release tablet  Commonly known as: IMDUR  TAKE (1) TABLET BY MOUTH DAILY     montelukast 10 MG tablet  Commonly known as: SINGULAIR  TAKE (1) TABLET BY MOUTH NIGHTLY     Myrbetriq 50 MG Tb24  Generic drug: mirabegron  TAKE 1 TABLET BY MOUTH EVERY DAY     Reglan 5 MG tablet  Generic drug: metoclopramide     simvastatin 20 MG tablet  Commonly known as: ZOCOR  TAKE 1 TABLET BY MOUTH NIGHTLY     Spiriva Respimat 1.25 MCG/ACT Aers inhaler  Generic drug: tiotropium  INHALE TWO (2) PUFFS BY MOUTH EVERY DAY     spironolactone 25 MG tablet  Commonly known as: ALDACTONE  Take 1 tablet by mouth daily     traZODone 100 MG tablet  Commonly known as: DESYREL  TAKE (1) TABLET BY MOUTH NIGHTLY     Tresiba FlexTouch 200 UNIT/ML Sopn  Generic drug: Insulin Degludec  INJECT 50 UNITS INTO THE SKIN nightly     Tylenol 325 MG Caps  Generic drug: Acetaminophen  Take 325 mg by mouth every 6 hours as needed (pain)     vitamin D3 25 MCG (1000 UT) Tabs tablet  Commonly known as: CHOLECALCIFEROL  TAKE 1 TABLET BY MOUTH EVERY DAY        STOP taking these medications    Entresto 24-26 MG per tablet  Generic drug: sacubitril-valsartan  Replaced by: sacubitril-valsartan 49-51 MG per tablet     famotidine 40 MG tablet  Commonly known as: PEPCID     loratadine 10 MG tablet  Commonly known as: CLARITIN     magnesium oxide 400 MG tablet  Commonly known as: MAG-OX     omeprazole 40 MG delayed release capsule  Commonly known as: PRILOSEC     zinc 50 MG Tabs tablet           Where to Get Your Medications      These medications were sent to [facility]/pharmacy #[id number] Courser [address] [facility] - P [phone] Altagracia Reyes [phone]  [address] [place] [address], [address] [place] [address]    Phone: [phone]   · carvedilol 25 MG tablet  · doxycycline hyclate 100 MG capsule  · guaiFENesin 400 MG tablet  · hydrALAZINE 50 MG tablet  · miconazole 2 % powder  · predniSONE 10 MG tablet  · sacubitril-valsartan 49-51 MG per tablet  · traMADol 50 MG tablet           Note that more than 30 minutes was spent in preparing discharge papers, discussing discharge with patient, medication review, etc.    Signed:  Electronically signed by Tomás Morton MD on 6/16/2021 at 10:06 AM

## 2021-06-17 ENCOUNTER — TELEPHONE (OUTPATIENT)
Dept: PHARMACY | Facility: CLINIC | Age: 66
End: 2021-06-17

## 2021-06-17 ENCOUNTER — TELEPHONE (OUTPATIENT)
Dept: FAMILY MEDICINE CLINIC | Age: 66
End: 2021-06-17

## 2021-06-17 ENCOUNTER — CARE COORDINATION (OUTPATIENT)
Dept: CASE MANAGEMENT | Age: 66
End: 2021-06-17

## 2021-06-17 ENCOUNTER — TELEPHONE (OUTPATIENT)
Dept: ADMINISTRATIVE | Age: 66
End: 2021-06-17

## 2021-06-17 NOTE — LETTER
Kavitha Ruffin   [facility] [id number]           06/21/21     Dear Wei Sinclair are eligible for a complete medication therapy review performed by a [address] [place] [address],[address] licensed clinical pharmacist. This review helps ensure that you are getting the most benefit from the medications you receive and includes the following:  - Review of your medications, including over-the-counter and herbal medications. - Answering questions about your medications and how to get the most benefit from them. - Identifying and helping to prevent potential drug interactions or side effects.  - Possibly identifying less costly alternatives that are equally effective. Under this program, [employer] will work with you and your doctor to manage your drug therapy. Please contact the [address] [place] [address] office to set up a time for your medication review with one of our clinical pharmacists. To contact us call [phone] or [phone], and select Option 7. This will be a phone consult and therefore will not require a trip to the medical office. Please note: This is an optional program.  It is a free service provided to help ensure that your medicines are safe, necessary, and effective. Your participation is encouraged, but not required.     Sincerely,  Cliff R NANCY An   Department, toll free: [phone], option 7

## 2021-06-17 NOTE — TELEPHONE ENCOUNTER
Received a referral:  from Care Coordinator to review patients medications. Called patient to schedule a time to speak with a pharmacist over the telephone. No answer left VM: Please contact us at [phone] Option #7 to schedule this appointment. Pharmacists are available Monday thru Friday 7:30 AM till 5:30 PM.    Patient not found in Outcomes [address].    [address] [place] [address], toll free: [phone], option 7

## 2021-06-17 NOTE — TELEPHONE ENCOUNTER
Zena Perez RN  [facility] 1 hour ago (3:12 PM)   JA  Patient is agreeable to a call to set up a telephone appointment to review medications.  Polypharmacy,  DM/CHF/COPD history.  Thank You

## 2021-06-17 NOTE — CARE COORDINATION
[facility] Transitions Initial Follow Up Call    Call within 2 business days of discharge: Yes    Patient: Jerry Girard Patient : 1955   MRN: [id number]  Reason for Admission: acute respiratory failure with hypoxia  Discharge Date: 21 RARS: Readmission Risk Score: 43      Last Discharge [address] [address]       Complaint Diagnosis Description Type Department Provider    21 Cough; Shortness of Breath Acute respiratory failure with hypoxia ([facility] [address] . .. ED to Hosp-Admission (Discharged) (ADMITTED) DAVID Wilson MD; Jason Montes DO. .. Transitions of Care Initial Call        Challenges to be reviewed by the provider   Additional needs identified to be addressed with provider: No  none             Method of communication with provider : none      Advance Care Planning:   Does patient have an Advance Directive:  decision maker reviewed and current. Was this a readmission? No  Patient stated reason for admission: short of breath  Patients top risk factors for readmission: medical condition-acute respiratory failure, DM, CHF, COPD, multiple health system providers and polypharmacy    Care Transition Nurse (CTN) contacted the patient by telephone to perform post hospital discharge assessment. Verified name and  with patient as identifiers. Provided introduction to self, and explanation of the CTN role. CTN reviewed discharge instructions, medical action plan and red flags with patient who verbalized understanding. Patient given an opportunity to ask questions and does not have any further questions or concerns at this time. Were discharge instructions available to patient? Yes. Reviewed appropriate site of care based on symptoms and resources available to patient including: PCP, Specialist and Home health. The patient agrees to contact the PCP office for questions related to their healthcare.      Medication reconciliation was not performed with patient as she is agreeable to a using-patient placed on hold while CTN phoned [facility]. Per Martha Bran at [facility] visit planned for tomorrow(6/18/21) Patient updated. Care Transitions 24 Hour Call    Schedule Follow Up Appointment with PCP: Declined  Do you have a copy of your discharge instructions?: Yes  Do you have all of your prescriptions and are they filled?: No  Have you been contacted by a [address]?: No  Have you scheduled your follow up appointment?: No  Were you discharged with any Home Care or Post Acute Services: Yes  Post Acute Services: Home Health (Comment: [facility]- she states she missed their call and will call them back today. 6/17/21-[facility]. [facility] for oxygen needs)  Do you feel like you have everything you need to keep you well at home?: Yes  Care Transitions Interventions    Pharmacist: Completed         -Spoke with patient for initial care transition call post hospital discharge.  -Patient states she is \"so far, so good\" happy to be home, slept well, drinking fluids well, appetite for solid foods fair.  -Patient reports a B/P reading of 132/68 today which she states is very good for her.  -Patient states her aide provides transportation to any appointments.  -Patient denies any needs, questions, or concerns at this time and is agreeable to continued follow up from CTN.     Follow Up  Future Appointments   Date Time Provider Melanie Sellers   7/21/2021  2:00 PM DO Edwin Acosta [facility]   11/18/2021  2:45 PM DO JUSTA Gillespie [facility] - BE[facility]IOR[facility] ENT [facility]   12/16/2021  2:30 PM Nafisa Pink MD VASC/MED [facility]       Flaca Irene RN

## 2021-06-18 ENCOUNTER — TELEPHONE (OUTPATIENT)
Dept: FAMILY MEDICINE CLINIC | Age: 66
End: 2021-06-18

## 2021-06-18 NOTE — TELEPHONE ENCOUNTER
No tresiba is not sliding scale. If her sugar is high she should take the humalog and use the sliding scale with that.

## 2021-06-18 NOTE — TELEPHONE ENCOUNTER
Pt called in wanting to know about response to original call.   I Advised pt that Dr. Garcia Links it was [address] [address] to continue with meds and sliding scale

## 2021-06-18 NOTE — TELEPHONE ENCOUNTER
Per Dr. Ksenia Stinson note from 6/16/21, patient needs needs to follow-up in (1) month and BMP in (1) week.

## 2021-06-18 NOTE — TELEPHONE ENCOUNTER
Per patient she is referring to the [facility].  Pt states shes on prednisone and last night her sugar was 491

## 2021-06-18 NOTE — TELEPHONE ENCOUNTER
She wants tresiba to be a sliding scale. Advised pt its not a sliding scale since it is long actiing. Pt insisted that it be on a sliding scale.  She is on prednisone and her b/s last nigth was 491

## 2021-06-18 NOTE — TELEPHONE ENCOUNTER
Patient notified of Dr. Boateng's recommendations. F/U scheduled for 7/30/21 at 9:00 a.m. Patient states she has a script for BMP.

## 2021-06-21 NOTE — TELEPHONE ENCOUNTER
Multiple Attempt Documentation:  Multiple attempts to contact this patient regarding the previous message  CLINICAL PHARMACY: REFERRAL  Patient unavailable at the time of call. Left following message on home TAD: please call back at toll-free [phone] option [phone] to retrieve previous message. Letter mailed to patient.

## 2021-06-23 ENCOUNTER — CARE COORDINATION (OUTPATIENT)
Dept: CASE MANAGEMENT | Age: 66
End: 2021-06-23

## 2021-06-23 NOTE — CARE COORDINATION
[facility] Transitions Follow Up Call    2021    Patient: Gene Dominguez  Patient : 1955   MRN: [id number]  Reason for Admission: Acute respiratory failure with hypoxia [facility])  Discharge Date: 21 RARS: Readmission Risk Score: 37         Spoke with: teto states she is feeling fine. Pt uses oxygen PRN . She states the physical therapist checked her O2 level before and after ambulating today and it was \"fine\" but did not have the exact reading. Appetite is good. Takes all medications as directed. FBS today was 105. Markell Burgos states she has an appt with Dr. Raul Dawson on 21 and with her Cardiologist Dr. Yenny Auguste on 21. Pt has [facility] for SN and PT. No new issues or concerns. Will continue to follow. Care Transitions Follow Up Call    Needs to be reviewed by the provider   Additional needs identified to be addressed with provider: No  none             Method of communication with provider : none      Care Transition Nurse (CTN) contacted the patient by telephone to follow up after admission on 21. Verified name and  with patient as identifiers. Addressed changes since last contact: none  Discussed follow-up appointments. If no appointment was previously scheduled, appointment scheduling offered: scheduled    Is follow up appointment scheduled within 7 days of discharge? No.    Advance Care Planning:   Does patient have an Advance Directive:  reviewed and current. CTN reviewed discharge instructions, medical action plan and red flags with patient and discussed any barriers to care and/or understanding of plan of care after discharge. Discussed appropriate site of care based on symptoms and resources available to patient including: PCP and Specialist. The patient agrees to contact the PCP office for questions related to their healthcare.      Patients top risk factors for readmission: functional physical ability and medical condition-respiratory failure     CHF  COPD DM  Interventions to address risk factors: Obtained and reviewed discharge summary and/or continuity of care documents    Non-[facility] follow up appointment(s): Dr. Keesha Morton      CTN provided contact information for future needs. Plan for follow-up call in 7-10 days based on severity of symptoms and risk factors. Plan for next call: symptom management-dyspnea  R/T  CHF  COPD           Care Transitions Subsequent and Final Call    Subsequent and Final Calls  Are you currently active with any services?: Home Health  Care Transitions Interventions    Pharmacist: Completed    Other Interventions:            Follow Up  Future Appointments   Date Time Provider Melanie Sellers   7/21/2021  2:00 PM DO Edwin Caicedo [facility]   7/30/2021  9:00 AM [facility]   11/18/2021  2:45 PM DO Karin Arroyo [facility]   12/16/2021  2:30 PM Edna Grey MD [facility]/MED [address], LPN     Lana Khalil LPN Care Transitions Nurse   [phone]

## 2021-06-25 ENCOUNTER — HOSPITAL ENCOUNTER (OUTPATIENT)
Age: 66
Discharge: HOME | End: 2021-06-25
Payer: MEDICARE

## 2021-06-25 DIAGNOSIS — I10 UNCONTROLLED HYPERTENSION: ICD-10-CM

## 2021-06-25 LAB
ANION GAP SERPL CALCULATED.3IONS-SCNC: 11 MMOL/L (ref 7–16)
BUN BLDV-MCNC: 38 MG/DL (ref 6–23)
CALCIUM SERPL-MCNC: 8.6 MG/DL (ref 8.6–10.2)
CHLORIDE BLD-SCNC: 106 MMOL/L (ref 98–107)
CO2: 26 MMOL/L (ref 22–29)
CREAT SERPL-MCNC: 1.2 MG/DL (ref 0.5–1)
GFR AFRICAN AMERICAN: 54
GFR NON-AFRICAN AMERICAN: 54 ML/MIN/1.73
GLUCOSE BLD-MCNC: 139 MG/DL (ref 74–99)
POTASSIUM SERPL-SCNC: 3.9 MMOL/L (ref 3.5–5)
SODIUM BLD-SCNC: 143 MMOL/L (ref 132–146)

## 2021-06-25 PROCEDURE — 36415 COLL VENOUS BLD VENIPUNCTURE: CPT

## 2021-06-25 PROCEDURE — 80048 BASIC METABOLIC PNL TOTAL CA: CPT

## 2021-06-25 RX ORDER — ASPIRIN 81 MG/1
TABLET, CHEWABLE ORAL
Qty: 90 TABLET | Refills: 1 | Status: SHIPPED
Start: 2021-06-25 | End: 2022-04-11

## 2021-06-30 ENCOUNTER — CARE COORDINATION (OUTPATIENT)
Dept: CASE MANAGEMENT | Age: 66
End: 2021-06-30

## 2021-06-30 NOTE — CARE COORDINATION
[facility] Transitions Follow Up Call    2021    Patient: Marly Cardoso  Patient : 1955   MRN: [id number]  Reason for Admission:  Acute respiratory failure with hypoxia [facility]  Discharge Date: 21 RARS: Readmission Risk Score: 37         Spoke with: Chelita Croft states she is doing good. Denies dyspnea or chest pain. Has oxygen in the home but is not currently using. Pt. States she saw Dr. Ede Iniguez ( pulmonary) yesterday. She said they ambulated her in the [place] and her pulse ox did not drop. Scheduled to see PCP on 21. Pt. States she continues to have [facility] visits weekly she has no new issues or concerns. Will continue to follow. Care Transitions Follow Up Call    Needs to be reviewed by the provider   Additional needs identified to be addressed with provider: No  none             Method of communication with provider : none      Care Transition Nurse (CTN) contacted the patient by telephone to follow up after admission on / Verified name and  with patient as identifiers. Addressed changes since last contact: none  Discussed follow-up appointments. If no appointment was previously scheduled, appointment scheduling offered: scheduled  Is follow up appointment scheduled within 7 days of discharge? Yes. Advance Care Planning:   Does patient have an Advance Directive:  reviewed and current. CTN reviewed discharge instructions, medical action plan and red flags with patient and discussed any barriers to care and/or understanding of plan of care after discharge. Discussed appropriate site of care based on symptoms and resources available to patient including: PCP, Specialist and Home health. The patient agrees to contact the PCP office for questions related to their healthcare.      Patients top risk factors for readmission: medical condition-CHF  COPD  DM  Interventions to address risk factors: Obtained and reviewed discharge summary and/or continuity of care documents    Non-[facility] follow up appointment(s):  6/29/21    CTN provided contact information for future needs. Plan for follow-up call in 7-10 days based on severity of symptoms and risk factors. Plan for next call: symptom management-dyspnea  r/t CHF  COPD  ARF          Care Transitions Subsequent and Final Call    Subsequent and Final Calls  Do you currently have any active services?: Yes  Are you currently active with any services?: Home Health  Care Transitions Interventions    Pharmacist: Completed    Other Interventions:            Follow Up  Future Appointments   Date Time Provider Melanie Sellers   7/8/2021 12:15 PM [place] 5DO Pineda [facility]   7/21/2021  2:00 PM DO Edwin Acosta [facility]   7/30/2021  9:00 AM Ha Sapp  [facility],  [address] Card [facility]   11/18/2021  2:45 PM DO Shanda Monroe [address] [facility]   12/16/2021  2:30 PM Hira De Guzman MD VAS/MED [address] [facility] [address]  [facility]rl[facility] Koenig [facility] Care Transitions Nurse   [phone]

## 2021-07-07 ENCOUNTER — CARE COORDINATION (OUTPATIENT)
Dept: CARE COORDINATION | Age: 66
End: 2021-07-07

## 2021-07-07 NOTE — CARE COORDINATION
Attempt made to outreach patient for subsequent follow up call. No answer and no VM to leave message.

## 2021-07-14 ENCOUNTER — TELEPHONE (OUTPATIENT)
Dept: FAMILY MEDICINE CLINIC | Age: 66
End: 2021-07-14

## 2021-07-14 ENCOUNTER — CARE COORDINATION (OUTPATIENT)
Dept: CASE MANAGEMENT | Age: 66
End: 2021-07-14

## 2021-07-14 RX ORDER — GUAIFENESIN 400 MG/1
400 TABLET ORAL 2 TIMES DAILY PRN
Qty: 60 TABLET | Refills: 2 | Status: SHIPPED
Start: 2021-07-14 | End: 2021-09-15 | Stop reason: ALTCHOICE

## 2021-07-14 NOTE — TELEPHONE ENCOUNTER
Pt calling and states that home health aid was to have called in today to request Mucinex rx for pt. I did not see any message regarding this. She is asking if this can be sent to [facility].

## 2021-07-15 ENCOUNTER — HOSPITAL ENCOUNTER (INPATIENT)
Age: 66
LOS: 6 days | Discharge: HOME HEALTH | DRG: 291 | End: 2021-07-21
Attending: EMERGENCY MEDICINE | Admitting: INTERNAL MEDICINE
Payer: MEDICARE

## 2021-07-15 ENCOUNTER — APPOINTMENT (OUTPATIENT)
Dept: GENERAL RADIOLOGY | Age: 66
DRG: 291 | End: 2021-07-15
Payer: MEDICARE

## 2021-07-15 DIAGNOSIS — J96.01 ACUTE RESPIRATORY FAILURE WITH HYPOXIA (HCC): Primary | ICD-10-CM

## 2021-07-15 DIAGNOSIS — I10 HYPERTENSION, UNSPECIFIED TYPE: ICD-10-CM

## 2021-07-15 DIAGNOSIS — I50.9 ACUTE ON CHRONIC CONGESTIVE HEART FAILURE, UNSPECIFIED HEART FAILURE TYPE (HCC): ICD-10-CM

## 2021-07-15 LAB
ALBUMIN SERPL-MCNC: 3.2 G/DL (ref 3.5–5.2)
ALP BLD-CCNC: 74 U/L (ref 35–104)
ALT SERPL-CCNC: 10 U/L (ref 0–32)
ANION GAP SERPL CALCULATED.3IONS-SCNC: 7 MMOL/L (ref 7–16)
AST SERPL-CCNC: 15 U/L (ref 0–31)
BASOPHILS ABSOLUTE: 0.01 E9/L (ref 0–0.2)
BASOPHILS RELATIVE PERCENT: 0.1 % (ref 0–2)
BILIRUB SERPL-MCNC: 0.3 MG/DL (ref 0–1.2)
BUN BLDV-MCNC: 20 MG/DL (ref 6–23)
CALCIUM SERPL-MCNC: 8.6 MG/DL (ref 8.6–10.2)
CHLORIDE BLD-SCNC: 103 MMOL/L (ref 98–107)
CO2: 27 MMOL/L (ref 22–29)
CREAT SERPL-MCNC: 1 MG/DL (ref 0.5–1)
EOSINOPHILS ABSOLUTE: 0.11 E9/L (ref 0.05–0.5)
EOSINOPHILS RELATIVE PERCENT: 1.5 % (ref 0–6)
GFR AFRICAN AMERICAN: >60
GFR NON-AFRICAN AMERICAN: >60 ML/MIN/1.73
GLUCOSE BLD-MCNC: 179 MG/DL (ref 74–99)
HCT VFR BLD CALC: 36.9 % (ref 34–48)
HEMOGLOBIN: 11.2 G/DL (ref 11.5–15.5)
IMMATURE GRANULOCYTES #: 0.02 E9/L
IMMATURE GRANULOCYTES %: 0.3 % (ref 0–5)
LYMPHOCYTES ABSOLUTE: 1.04 E9/L (ref 1.5–4)
LYMPHOCYTES RELATIVE PERCENT: 14.1 % (ref 20–42)
MCH RBC QN AUTO: 29.7 PG (ref 26–35)
MCHC RBC AUTO-ENTMCNC: 30.4 % (ref 32–34.5)
MCV RBC AUTO: 97.9 FL (ref 80–99.9)
METER GLUCOSE: 71 MG/DL (ref 74–99)
MONOCYTES ABSOLUTE: 0.33 E9/L (ref 0.1–0.95)
MONOCYTES RELATIVE PERCENT: 4.5 % (ref 2–12)
NEUTROPHILS ABSOLUTE: 5.86 E9/L (ref 1.8–7.3)
NEUTROPHILS RELATIVE PERCENT: 79.5 % (ref 43–80)
PDW BLD-RTO: 14.8 FL (ref 11.5–15)
PLATELET # BLD: 252 E9/L (ref 130–450)
PMV BLD AUTO: 10.8 FL (ref 7–12)
POTASSIUM REFLEX MAGNESIUM: 4.5 MMOL/L (ref 3.5–5)
PRO-BNP: 1870 PG/ML (ref 0–125)
PROCALCITONIN: 0.03 NG/ML (ref 0–0.08)
RBC # BLD: 3.77 E12/L (ref 3.5–5.5)
SARS-COV-2, NAAT: NOT DETECTED
SODIUM BLD-SCNC: 137 MMOL/L (ref 132–146)
TOTAL PROTEIN: 6.5 G/DL (ref 6.4–8.3)
TROPONIN, HIGH SENSITIVITY: 13 NG/L (ref 0–9)
TROPONIN, HIGH SENSITIVITY: 13 NG/L (ref 0–9)
WBC # BLD: 7.4 E9/L (ref 4.5–11.5)

## 2021-07-15 PROCEDURE — 36415 COLL VENOUS BLD VENIPUNCTURE: CPT

## 2021-07-15 PROCEDURE — 2700000000 HC OXYGEN THERAPY PER DAY

## 2021-07-15 PROCEDURE — 6370000000 HC RX 637 (ALT 250 FOR IP): Performed by: INTERNAL MEDICINE

## 2021-07-15 PROCEDURE — 84484 ASSAY OF TROPONIN QUANT: CPT

## 2021-07-15 PROCEDURE — 80053 COMPREHEN METABOLIC PANEL: CPT

## 2021-07-15 PROCEDURE — 6370000000 HC RX 637 (ALT 250 FOR IP): Performed by: STUDENT IN AN ORGANIZED HEALTH CARE EDUCATION/TRAINING PROGRAM

## 2021-07-15 PROCEDURE — 99285 EMERGENCY DEPT VISIT HI MDM: CPT

## 2021-07-15 PROCEDURE — 6360000002 HC RX W HCPCS: Performed by: STUDENT IN AN ORGANIZED HEALTH CARE EDUCATION/TRAINING PROGRAM

## 2021-07-15 PROCEDURE — 83880 ASSAY OF NATRIURETIC PEPTIDE: CPT

## 2021-07-15 PROCEDURE — 73620 X-RAY EXAM OF FOOT: CPT

## 2021-07-15 PROCEDURE — 87635 SARS-COV-2 COVID-19 AMP PRB: CPT

## 2021-07-15 PROCEDURE — 2060000000 HC ICU INTERMEDIATE R&B

## 2021-07-15 PROCEDURE — 84145 PROCALCITONIN (PCT): CPT

## 2021-07-15 PROCEDURE — 93005 ELECTROCARDIOGRAM TRACING: CPT | Performed by: STUDENT IN AN ORGANIZED HEALTH CARE EDUCATION/TRAINING PROGRAM

## 2021-07-15 PROCEDURE — 2500000003 HC RX 250 WO HCPCS: Performed by: STUDENT IN AN ORGANIZED HEALTH CARE EDUCATION/TRAINING PROGRAM

## 2021-07-15 PROCEDURE — 82962 GLUCOSE BLOOD TEST: CPT

## 2021-07-15 PROCEDURE — 94640 AIRWAY INHALATION TREATMENT: CPT

## 2021-07-15 PROCEDURE — 71045 X-RAY EXAM CHEST 1 VIEW: CPT

## 2021-07-15 PROCEDURE — 96374 THER/PROPH/DIAG INJ IV PUSH: CPT

## 2021-07-15 PROCEDURE — 96375 TX/PRO/DX INJ NEW DRUG ADDON: CPT

## 2021-07-15 PROCEDURE — 2580000003 HC RX 258

## 2021-07-15 PROCEDURE — 85025 COMPLETE CBC W/AUTO DIFF WBC: CPT

## 2021-07-15 RX ORDER — ASPIRIN 81 MG/1
81 TABLET, CHEWABLE ORAL DAILY
Status: DISCONTINUED | OUTPATIENT
Start: 2021-07-16 | End: 2021-07-21 | Stop reason: HOSPADM

## 2021-07-15 RX ORDER — DULOXETIN HYDROCHLORIDE 60 MG/1
60 CAPSULE, DELAYED RELEASE ORAL 2 TIMES DAILY
Status: DISCONTINUED | OUTPATIENT
Start: 2021-07-15 | End: 2021-07-21 | Stop reason: HOSPADM

## 2021-07-15 RX ORDER — ONDANSETRON 4 MG/1
4 TABLET, ORALLY DISINTEGRATING ORAL EVERY 8 HOURS PRN
Status: DISCONTINUED | OUTPATIENT
Start: 2021-07-15 | End: 2021-07-15

## 2021-07-15 RX ORDER — METOCLOPRAMIDE 5 MG/1
5 TABLET ORAL 3 TIMES DAILY
Status: DISCONTINUED | OUTPATIENT
Start: 2021-07-16 | End: 2021-07-21 | Stop reason: HOSPADM

## 2021-07-15 RX ORDER — NITROGLYCERIN 0.4 MG/1
0.4 TABLET SUBLINGUAL ONCE
Status: COMPLETED | OUTPATIENT
Start: 2021-07-15 | End: 2021-07-15

## 2021-07-15 RX ORDER — DEXTROSE MONOHYDRATE 50 MG/ML
100 INJECTION, SOLUTION INTRAVENOUS PRN
Status: DISCONTINUED | OUTPATIENT
Start: 2021-07-15 | End: 2021-07-21 | Stop reason: HOSPADM

## 2021-07-15 RX ORDER — INSULIN GLARGINE 100 [IU]/ML
40 INJECTION, SOLUTION SUBCUTANEOUS NIGHTLY
Status: DISCONTINUED | OUTPATIENT
Start: 2021-07-15 | End: 2021-07-21 | Stop reason: HOSPADM

## 2021-07-15 RX ORDER — MONTELUKAST SODIUM 10 MG/1
10 TABLET ORAL NIGHTLY
Status: DISCONTINUED | OUTPATIENT
Start: 2021-07-15 | End: 2021-07-21 | Stop reason: HOSPADM

## 2021-07-15 RX ORDER — SPIRONOLACTONE 25 MG/1
25 TABLET ORAL DAILY
Status: DISCONTINUED | OUTPATIENT
Start: 2021-07-16 | End: 2021-07-21 | Stop reason: HOSPADM

## 2021-07-15 RX ORDER — TRAZODONE HYDROCHLORIDE 50 MG/1
100 TABLET ORAL NIGHTLY
Status: DISCONTINUED | OUTPATIENT
Start: 2021-07-15 | End: 2021-07-21 | Stop reason: HOSPADM

## 2021-07-15 RX ORDER — BUMETANIDE 0.25 MG/ML
1 INJECTION, SOLUTION INTRAMUSCULAR; INTRAVENOUS ONCE
Status: COMPLETED | OUTPATIENT
Start: 2021-07-15 | End: 2021-07-15

## 2021-07-15 RX ORDER — NICOTINE POLACRILEX 4 MG
15 LOZENGE BUCCAL PRN
Status: DISCONTINUED | OUTPATIENT
Start: 2021-07-15 | End: 2021-07-21 | Stop reason: HOSPADM

## 2021-07-15 RX ORDER — SODIUM CHLORIDE 0.9 % (FLUSH) 0.9 %
10 SYRINGE (ML) INJECTION PRN
Status: DISCONTINUED | OUTPATIENT
Start: 2021-07-15 | End: 2021-07-21 | Stop reason: HOSPADM

## 2021-07-15 RX ORDER — CARVEDILOL 25 MG/1
25 TABLET ORAL 2 TIMES DAILY WITH MEALS
Status: DISCONTINUED | OUTPATIENT
Start: 2021-07-16 | End: 2021-07-21 | Stop reason: HOSPADM

## 2021-07-15 RX ORDER — VITAMIN B COMPLEX
1000 TABLET ORAL DAILY
Status: DISCONTINUED | OUTPATIENT
Start: 2021-07-16 | End: 2021-07-21 | Stop reason: HOSPADM

## 2021-07-15 RX ORDER — DEXTROSE MONOHYDRATE 25 G/50ML
12.5 INJECTION, SOLUTION INTRAVENOUS PRN
Status: DISCONTINUED | OUTPATIENT
Start: 2021-07-15 | End: 2021-07-21 | Stop reason: HOSPADM

## 2021-07-15 RX ORDER — ONDANSETRON 2 MG/ML
4 INJECTION INTRAMUSCULAR; INTRAVENOUS EVERY 6 HOURS PRN
Status: DISCONTINUED | OUTPATIENT
Start: 2021-07-15 | End: 2021-07-15

## 2021-07-15 RX ORDER — DOCUSATE SODIUM 100 MG/1
100 CAPSULE, LIQUID FILLED ORAL NIGHTLY
Status: DISCONTINUED | OUTPATIENT
Start: 2021-07-15 | End: 2021-07-21 | Stop reason: HOSPADM

## 2021-07-15 RX ORDER — TROSPIUM CHLORIDE 20 MG/1
20 TABLET, FILM COATED ORAL
Refills: 1 | Status: DISCONTINUED | OUTPATIENT
Start: 2021-07-16 | End: 2021-07-21 | Stop reason: HOSPADM

## 2021-07-15 RX ORDER — SODIUM CHLORIDE 0.9 % (FLUSH) 0.9 %
10 SYRINGE (ML) INJECTION EVERY 12 HOURS SCHEDULED
Status: DISCONTINUED | OUTPATIENT
Start: 2021-07-15 | End: 2021-07-21 | Stop reason: HOSPADM

## 2021-07-15 RX ORDER — HYDRALAZINE HYDROCHLORIDE 50 MG/1
50 TABLET, FILM COATED ORAL EVERY 8 HOURS SCHEDULED
Status: DISCONTINUED | OUTPATIENT
Start: 2021-07-15 | End: 2021-07-20

## 2021-07-15 RX ORDER — HYDRALAZINE HYDROCHLORIDE 20 MG/ML
10 INJECTION INTRAMUSCULAR; INTRAVENOUS ONCE
Status: COMPLETED | OUTPATIENT
Start: 2021-07-15 | End: 2021-07-15

## 2021-07-15 RX ORDER — ACETAMINOPHEN 325 MG/1
650 TABLET ORAL EVERY 6 HOURS PRN
Status: DISCONTINUED | OUTPATIENT
Start: 2021-07-15 | End: 2021-07-21 | Stop reason: HOSPADM

## 2021-07-15 RX ORDER — POTASSIUM CHLORIDE 7.45 MG/ML
10 INJECTION INTRAVENOUS PRN
Status: DISCONTINUED | OUTPATIENT
Start: 2021-07-15 | End: 2021-07-21 | Stop reason: HOSPADM

## 2021-07-15 RX ORDER — SODIUM CHLORIDE 9 MG/ML
25 INJECTION, SOLUTION INTRAVENOUS PRN
Status: DISCONTINUED | OUTPATIENT
Start: 2021-07-15 | End: 2021-07-21 | Stop reason: HOSPADM

## 2021-07-15 RX ORDER — GUAIFENESIN 400 MG/1
400 TABLET ORAL 2 TIMES DAILY PRN
Status: DISCONTINUED | OUTPATIENT
Start: 2021-07-15 | End: 2021-07-21 | Stop reason: HOSPADM

## 2021-07-15 RX ORDER — ATORVASTATIN CALCIUM 20 MG/1
20 TABLET, FILM COATED ORAL DAILY
Refills: 3 | Status: DISCONTINUED | OUTPATIENT
Start: 2021-07-16 | End: 2021-07-15 | Stop reason: CLARIF

## 2021-07-15 RX ORDER — POTASSIUM CHLORIDE 20 MEQ/1
40 TABLET, EXTENDED RELEASE ORAL PRN
Status: DISCONTINUED | OUTPATIENT
Start: 2021-07-15 | End: 2021-07-21 | Stop reason: HOSPADM

## 2021-07-15 RX ORDER — SODIUM CHLORIDE 0.9 % (FLUSH) 0.9 %
SYRINGE (ML) INJECTION
Status: COMPLETED
Start: 2021-07-15 | End: 2021-07-15

## 2021-07-15 RX ORDER — ACETAMINOPHEN 650 MG/1
650 SUPPOSITORY RECTAL EVERY 6 HOURS PRN
Status: DISCONTINUED | OUTPATIENT
Start: 2021-07-15 | End: 2021-07-21 | Stop reason: HOSPADM

## 2021-07-15 RX ORDER — LABETALOL HYDROCHLORIDE 5 MG/ML
5 INJECTION, SOLUTION INTRAVENOUS EVERY 4 HOURS PRN
Status: DISCONTINUED | OUTPATIENT
Start: 2021-07-15 | End: 2021-07-21 | Stop reason: HOSPADM

## 2021-07-15 RX ORDER — BACLOFEN 10 MG/1
10 TABLET ORAL 2 TIMES DAILY PRN
Status: DISCONTINUED | OUTPATIENT
Start: 2021-07-15 | End: 2021-07-21 | Stop reason: HOSPADM

## 2021-07-15 RX ORDER — BUMETANIDE 1 MG/1
1 TABLET ORAL 2 TIMES DAILY
Status: DISCONTINUED | OUTPATIENT
Start: 2021-07-15 | End: 2021-07-16

## 2021-07-15 RX ORDER — GABAPENTIN 100 MG/1
100 CAPSULE ORAL 2 TIMES DAILY
Status: DISCONTINUED | OUTPATIENT
Start: 2021-07-15 | End: 2021-07-21 | Stop reason: HOSPADM

## 2021-07-15 RX ORDER — SENNA PLUS 8.6 MG/1
1 TABLET ORAL DAILY PRN
Status: DISCONTINUED | OUTPATIENT
Start: 2021-07-15 | End: 2021-07-21 | Stop reason: HOSPADM

## 2021-07-15 RX ORDER — SIMVASTATIN 20 MG
20 TABLET ORAL NIGHTLY
Status: DISCONTINUED | OUTPATIENT
Start: 2021-07-16 | End: 2021-07-21 | Stop reason: HOSPADM

## 2021-07-15 RX ORDER — ISOSORBIDE MONONITRATE 60 MG/1
60 TABLET, EXTENDED RELEASE ORAL DAILY
Status: DISCONTINUED | OUTPATIENT
Start: 2021-07-16 | End: 2021-07-21 | Stop reason: HOSPADM

## 2021-07-15 RX ORDER — IPRATROPIUM BROMIDE AND ALBUTEROL SULFATE 2.5; .5 MG/3ML; MG/3ML
3 SOLUTION RESPIRATORY (INHALATION) ONCE
Status: COMPLETED | OUTPATIENT
Start: 2021-07-15 | End: 2021-07-15

## 2021-07-15 RX ADMIN — DULOXETINE HYDROCHLORIDE 60 MG: 60 CAPSULE, DELAYED RELEASE ORAL at 22:47

## 2021-07-15 RX ADMIN — TRAZODONE HYDROCHLORIDE 100 MG: 50 TABLET ORAL at 22:47

## 2021-07-15 RX ADMIN — SODIUM CHLORIDE, PRESERVATIVE FREE 10 ML: 5 INJECTION INTRAVENOUS at 16:48

## 2021-07-15 RX ADMIN — BUMETANIDE 1 MG: 1 TABLET ORAL at 22:47

## 2021-07-15 RX ADMIN — IPRATROPIUM BROMIDE AND ALBUTEROL SULFATE 3 ML: .5; 3 SOLUTION RESPIRATORY (INHALATION) at 15:08

## 2021-07-15 RX ADMIN — BUMETANIDE 1 MG: 0.25 INJECTION, SOLUTION INTRAMUSCULAR; INTRAVENOUS at 16:48

## 2021-07-15 RX ADMIN — NITROGLYCERIN 0.4 MG: 0.4 TABLET SUBLINGUAL at 17:44

## 2021-07-15 RX ADMIN — DOCUSATE SODIUM 100 MG: 100 CAPSULE, LIQUID FILLED ORAL at 22:47

## 2021-07-15 RX ADMIN — Medication 10 ML: at 16:48

## 2021-07-15 RX ADMIN — HYDRALAZINE HYDROCHLORIDE 50 MG: 50 TABLET, FILM COATED ORAL at 22:47

## 2021-07-15 RX ADMIN — NITROGLYCERIN 0.4 MG: 0.4 TABLET SUBLINGUAL at 16:43

## 2021-07-15 RX ADMIN — MONTELUKAST SODIUM 10 MG: 10 TABLET, FILM COATED ORAL at 22:47

## 2021-07-15 RX ADMIN — SACUBITRIL AND VALSARTAN 1 TABLET: 49; 51 TABLET, FILM COATED ORAL at 23:11

## 2021-07-15 RX ADMIN — GABAPENTIN 100 MG: 100 CAPSULE ORAL at 22:47

## 2021-07-15 RX ADMIN — INSULIN GLARGINE 40 UNITS: 100 INJECTION, SOLUTION SUBCUTANEOUS at 22:58

## 2021-07-15 RX ADMIN — HYDRALAZINE HYDROCHLORIDE 10 MG: 20 INJECTION INTRAMUSCULAR; INTRAVENOUS at 17:44

## 2021-07-15 ASSESSMENT — ENCOUNTER SYMPTOMS
COUGH: 1
DIARRHEA: 0
ABDOMINAL PAIN: 0
BACK PAIN: 0
WHEEZING: 0
NAUSEA: 0
VOMITING: 0
SORE THROAT: 0
SINUS PRESSURE: 0
SHORTNESS OF BREATH: 1

## 2021-07-15 ASSESSMENT — PAIN SCALES - GENERAL
PAINLEVEL_OUTOF10: 0
PAINLEVEL_OUTOF10: 10
PAINLEVEL_OUTOF10: 0

## 2021-07-15 ASSESSMENT — PAIN DESCRIPTION - FREQUENCY: FREQUENCY: CONTINUOUS

## 2021-07-15 ASSESSMENT — PAIN DESCRIPTION - ORIENTATION: ORIENTATION: RIGHT

## 2021-07-15 ASSESSMENT — PAIN DESCRIPTION - PAIN TYPE
TYPE: ACUTE PAIN
TYPE: ACUTE PAIN

## 2021-07-15 ASSESSMENT — PAIN DESCRIPTION - DESCRIPTORS: DESCRIPTORS: SPASM

## 2021-07-15 ASSESSMENT — PAIN DESCRIPTION - LOCATION
LOCATION: LEG
LOCATION: BACK;FOOT

## 2021-07-15 NOTE — ED NOTES
Pt to ed by private car asked for wheelchair and assistance out of car. Pt smoking cigarette. States sent by pcp for low pulse ox pt states has been coughing and congested since hospitalization in [place] for bronchitis and sob.  Pt 83% on room air, placed on 4l nasal cannula in triage, pulse ox 93%        Yoel Kemp RN  07/15/21 8782

## 2021-07-15 NOTE — ED PROVIDER NOTES
This is a 62-year-old female with complex past medical history including DAYAN, COPD, CHF, history of Covid 19 pneumonia followed by pulmonology, thought to have some interstitial scarring and fibrosis secondary to COVID-19. She is not on home O2, is on nightly CPAP. She is presenting today for 1 week of shortness of breath, fatigue, productive cough. She states that she has been coughing up yellow sputum, no hemoptysis. She has not had fevers or chills. She states she had a recent hospital admission last month, was initially started on home oxygen for the first week, followed up with pulmonology Dr. Sarah Alcantara, was ambulated in the office without desaturation and therefore home O2 was discontinued. She lives at home alone, but does have daily home health care. She has no history of DVT or PE, no leg pain or swelling, no history of malignancy, no recent surgeries. She has not had any lightheadedness or syncope. She does not have any chest pain, does have a history of MI in 2011, also has history of CHF, followed by cardiology Dr. Maria M Drew. Patient went to outpatient physician today, was found to be hypoxic and was therefore referred to the emergency department for immediate evaluation. Patient also states 2 days ago she dropped a frozen chicken on her right foot, since then she has had pain on the dorsal aspect of the right foot, she has been able to bear weight on it however. The history is provided by the patient and medical records. Review of Systems   Constitutional: Positive for fatigue. Negative for chills and fever. HENT: Negative for ear pain, sinus pressure and sore throat. Eyes: Negative for visual disturbance. Respiratory: Positive for cough and shortness of breath. Negative for wheezing. Cardiovascular: Negative for chest pain, palpitations and leg swelling. Gastrointestinal: Negative for abdominal pain, diarrhea, nausea and vomiting.    Genitourinary: Negative for dysuria and flank pain. Musculoskeletal: Negative for back pain and neck pain. Skin: Negative for rash and wound. Neurological: Negative for syncope, weakness and light-headedness. Hematological: Negative for adenopathy. All other systems reviewed and are negative. Physical Exam  Vitals and nursing note reviewed. Constitutional:       Appearance: She is obese. Comments: Chronically ill-appearing elderly female sitting in wheelchair comfortably. She is mildly tachypneic, but no severe respiratory distress   HENT:      Head: Normocephalic and atraumatic. Right Ear: External ear normal.      Left Ear: External ear normal.      Nose: Nose normal.   Eyes:      Extraocular Movements: Extraocular movements intact. Conjunctiva/sclera: Conjunctivae normal.   Cardiovascular:      Rate and Rhythm: Regular rhythm. Tachycardia present. Pulses: Normal pulses. Heart sounds: Normal heart sounds. Pulmonary:      Breath sounds: Rhonchi present. Comments: Mild tachypnea, no accessory muscle use. Mildly decreased breath sounds throughout bilateral lungs with coarse rhonchi throughout all lung fields. Abdominal:      Comments: Round, soft, nontender   Musculoskeletal:      Cervical back: Normal range of motion and neck supple. Right lower leg: Edema present. Left lower leg: Edema present. Comments: Tender to palpation over the first and second metatarsals of the right foot. No calf swelling or tenderness. 1+ pitting edema pretibial bilaterally   Skin:     General: Skin is warm and dry. Capillary Refill: Capillary refill takes less than 2 seconds. Neurological:      General: No focal deficit present. Mental Status: She is alert. Psychiatric:         Mood and Affect: Mood normal.         Behavior: Behavior normal.         Thought Content:  Thought content normal.          Procedures     MDM  Number of Diagnoses or Management Options  Acute on chronic congestive heart failure, unspecified heart failure type (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 Patient presenting today for increased shortness of breath, cough, hypoxia. On initial evaluation patient 88% on room air, tachypneic, but otherwise well-appearing. Patient also dropped a frozen chicken on her foot, with no evidence of fracture on x-ray. Patient afebrile, with improvement in oxygenation on 4 L of O2 via nasal cannula. Patient did not improve significantly with DuoNeb treatments, therefore doubt significant COPD exacerbation. Patient initially markedly hypertensive, she was treated with nitroglycerin and dose of Bumex for concern for flash pulmonary edema. Patient's chest x-ray with diffuse infiltrates/edema, appears largely unchanged from patient's previous chest x-rays, suspect much of the opacities related to patient's underlying  fibrosis from her previous COVID-19 infection. Doubt bacterial pneumonia given patient's lack of leukocytosis, lack of fever, but defer this decision to inpatient team, procalcitonin was added on to assist inpatient team.  Patient was initially discharged from the hospital on her most recent admission on oxygen, states she was evaluated by pulmonology and was able to come off oxygen. Given patient's new hypoxic respiratory failure, plan to admit patient to the hospital for further work-up, monitoring, management. ED Course as of Jul 15 2232   Thu Jul 15, 2021   1541 EKG: This EKG is signed and interpreted by me.     Rate: 89  Rhythm: Sinus and with Right BBB  Interpretation: non-specific EKG  Comparison: stable as compared to patient's most recent EKG      [RH]   1541 No improvement in shortness of breath with duo nebs, concern for possible pulmonary edema, will try nitro    [RH]   7638 Patient states she takes bumex BID, did take her morning dose     [RH]   3559 Patient reevaluated, lungs are still rhonchorous with scattered expiratory wheezes. Patient does not feel any better after getting nitro, Bumex. Patient also did not feel better after having duo nebs. Patient currently on 4 liters of o2 via NC.     [RH]   2109 Discussed case with Dr. Bruce Louise, he agreed to admit patient. [RH]      ED Course User Index  [RH] [address], DO     ED Course as of Jul 15 2232   Thu Jul 15, 2021   1541 EKG: This EKG is signed and interpreted by me. Rate: 89  Rhythm: Sinus and with Right BBB  Interpretation: non-specific EKG  Comparison: stable as compared to patient's most recent EKG      [RH]   1541 No improvement in shortness of breath with duo nebs, concern for possible pulmonary edema, will try nitro    [RH]   1634 Patient states she takes bumex BID, did take her morning dose     [RH]   1732 Patient reevaluated, lungs are still rhonchorous with scattered expiratory wheezes. Patient does not feel any better after getting nitro, Bumex. Patient also did not feel better after having duo nebs. Patient currently on 4 liters of o2 via NC.     [RH]   2109 Discussed case with Dr. Bruce Louise, he agreed to admit patient.     [RH]      ED Course User Index  [RH] [address], DO       --------------------------------------------- PAST HISTORY ---------------------------------------------  Past Medical History:  has a past medical history of Asthma, Atherosclerosis of native artery of left leg with rest pain ([facility] [address] C. difficile diarrhea, CAD (coronary artery disease), Cellulitis and abscess of trunk, Cerebellar infarct (HCC), Chronic back pain, Chronic kidney disease, Chronic systolic congestive heart failure ([address] Chronic venous insufficiency, COPD [facility]chronic obstructive pulmonary disease) [facility] Depression, Diabetes mellitus ([facility] Diabetic neuropathy ([facility] Diverticulosis, Fatty liver, Glaucoma, open angle, Hepatic encephalopathy ([address] Hiatal hernia, History of blood transfusion, Hyperlipidemia, Hyperplastic colon polyp, Hypertension, Incontinence, Liver mass, Morbid obesity ([address] Movement disorder, Myocardial infarction ([address] Osteoarthritis, generalized, Pain in both lower legs, Peripheral vascular disease ([address] Pneumonia, Pulmonary edema, PVD (peripheral vascular disease) with claudication ([address] Sleep apnea, Status post peripheral artery angioplasty, Tobacco abuse, Tobacco abuse, Tubular adenoma polyp of rectum, Urinary tract infection due to ESBL Klebsiella, and Ventral hernia. Past Surgical History:  has a past surgical history that includes knee surgery; Upper gastrointestinal endoscopy (12/20/12); Coronary artery bypass graft; layer wound closure (Left, [id number]); Echo Complete (10/9/2013); polysomnography (1/2016); liver biopsy (1/8/2016); bronchial brush biopsy (1/25/2013); Breast surgery (2000); Cholecystectomy (YRS AGO); Cardiac surgery (12/2011); Cardiac catheterization (04/20/2015); Hysterectomy; joint replacement (2011); Upper gastrointestinal endoscopy (12/23/2016); Colonoscopy (11/15/2013); Colonoscopy (12/23/2016); Upper gastrointestinal endoscopy (02/08/2017); Endoscopy, colon, diagnostic (04/18/2017); Gallbladder surgery; and angioplasty (11/13/2018). Social History:  reports that she has been smoking cigarettes. She started smoking about 46 years ago. She has a 10.00 pack-year smoking history. She has never used smokeless tobacco. She reports current alcohol use. She reports current drug use. Drug: Marijuana. Family History: family history includes Asthma in her father; Cancer in her mother. The patients home medications have been reviewed.     Allergies: Latex, Bee venom, Dilaudid [hydromorphone hcl], Dye [iodides], Percocet [oxycodone-acetaminophen], Keflex [cephalexin], Lasix [furosemide], Levaquin [levofloxacin in d5w], Lipitor, Lyrica [pregabalin], Morphine, Naproxen, Nefazodone, Norvasc [amlodipine], Oxycodone-acetaminophen, Shellfish-derived products, and Trazodone and nefazodone    -------------------------------------------------- RESULTS -------------------------------------------------    LABS:  Results for orders placed or performed during the hospital encounter of 07/15/21   COVID-19, Rapid    Specimen: Nasopharyngeal Swab   Result Value Ref Range    SARS-CoV-2, NAAT Not Detected Not Detected   CBC Auto Differential   Result Value Ref Range    WBC 7.4 4.5 - 11.5 E9/L    RBC 3.77 3.50 - 5.50 E12/L    Hemoglobin 11.2 (L) 11.5 - 15.5 g/dL    Hematocrit 36.9 34.0 - 48.0 %    MCV 97.9 80.0 - 99.9 fL    MCH 29.7 26.0 - 35.0 pg    MCHC 30.4 (L) 32.0 - 34.5 %    RDW 14.8 11.5 - 15.0 fL    Platelets 057 832 - 699 E9/L    MPV 10.8 7.0 - 12.0 fL    Neutrophils % 79.5 43.0 - 80.0 %    Immature Granulocytes % 0.3 0.0 - 5.0 %    Lymphocytes % 14.1 (L) 20.0 - 42.0 %    Monocytes % 4.5 2.0 - 12.0 %    Eosinophils % 1.5 0.0 - 6.0 %    Basophils % 0.1 0.0 - 2.0 %    Neutrophils Absolute 5.86 1.80 - 7.30 E9/L    Immature Granulocytes # 0.02 E9/L    Lymphocytes Absolute 1.04 (L) 1.50 - 4.00 E9/L    Monocytes Absolute 0.33 0.10 - 0.95 E9/L    Eosinophils Absolute 0.11 0.05 - 0.50 E9/L    Basophils Absolute 0.01 0.00 - 0.20 E9/L   Comprehensive Metabolic Panel w/ Reflex to MG   Result Value Ref Range    Sodium 137 132 - 146 mmol/L    Potassium reflex Magnesium 4.5 3.5 - 5.0 mmol/L    Chloride 103 98 - 107 mmol/L    CO2 27 22 - 29 mmol/L    Anion Gap 7 7 - 16 mmol/L    Glucose 179 (H) 74 - 99 mg/dL    BUN 20 6 - 23 mg/dL    CREATININE 1.0 0.5 - 1.0 mg/dL    GFR Non-African American >60 >=60 mL/min/1.73    GFR African American >60     Calcium 8.6 8.6 - 10.2 mg/dL    Total Protein 6.5 6.4 - 8.3 g/dL    Albumin 3.2 (L) 3.5 - 5.2 g/dL    Total Bilirubin 0.3 0.0 - 1.2 mg/dL    Alkaline Phosphatase 74 35 - 104 U/L    ALT 10 0 - 32 U/L    AST 15 0 - 31 U/L Troponin   Result Value Ref Range    Troponin, High Sensitivity 13 (H) 0 - 9 ng/L   Brain Natriuretic Peptide   Result Value Ref Range    Pro-BNP 1,870 (H) 0 - 125 pg/mL   Troponin   Result Value Ref Range    Troponin, High Sensitivity 13 (H) 0 - 9 ng/L   EKG 12 Lead   Result Value Ref Range    Ventricular Rate 89 BPM    Atrial Rate 89 BPM    P-R Interval 186 ms    QRS Duration 144 ms    Q-T Interval 450 ms    QTc Calculation (Bazett) 547 ms    P Axis 46 degrees    R Axis 57 degrees    T Axis 11 degrees       RADIOLOGY:  XR CHEST PORTABLE   Final Result   Opacities may represent moderate pulmonary edema. Multifocal pneumonia may   also be considered in the proper clinical setting. Small left-sided pleural effusion. XR FOOT RIGHT (2 VIEWS)   Final Result   No acute osseous abnormality. Soft tissue swelling. EKG:  See ED COURSE      ------------------------- NURSING NOTES AND VITALS REVIEWED ---------------------------  Date / Time Roomed:  7/15/2021  2:20 PM  ED Bed Assignment:  [id number]/[id number]    The nursing notes within the ED encounter and vital signs as below have been reviewed.      Patient Vitals for the past 24 hrs:   BP Temp Temp src Pulse Resp SpO2 Height Weight   07/15/21 2148 (!) 179/93 98.1 °F (36.7 °C) Oral 88 18 96 % 5' 1\" (1.549 m) 194 lb 12.8 oz (88.4 kg)   07/15/21 2127 (!) 141/73 97.6 °F (36.4 °C) Oral 88 18 97 % -- --   07/15/21 1958 (!) 133/59 -- -- 89 -- 98 % -- --   07/15/21 1915 (!) 212/113 -- -- 86 -- 97 % -- --   07/15/21 1907 -- -- -- -- -- -- 5' 1.5\" (1.562 m) 219 lb (99.3 kg)   07/15/21 1826 (!) 188/80 -- -- 84 -- 99 % -- --   07/15/21 1744 (!) 207/104 -- -- -- -- -- -- --   07/15/21 1731 (!) 207/104 -- -- 89 -- 98 % -- --   07/15/21 1633 (!) 192/105 -- -- 88 -- 95 % -- --   07/15/21 1508 -- -- -- -- -- 97 % -- --   07/15/21 1423 (!) 168/89 97.2 °F (36.2 °C) -- 100 28 93 % -- --   07/15/21 1422 -- -- -- 108 28 (!) 83 % -- --       Oxygen Saturation Interpretation: abnormal, Improved after treatment    ------------------------------------------ PROGRESS NOTES ------------------------------------------  Re-evaluation(s):  See ED COURSE    Counseling:  I have spoken with the patient and discussed todays results, in addition to providing specific details for the plan of care and counseling regarding the diagnosis and prognosis. Their questions are answered at this time and they are agreeable with the plan of admission.    --------------------------------- ADDITIONAL PROVIDER NOTES ---------------------------------  Consultations:  See ED COURSE  This patient's ED course included: a personal history and physicial examination, re-evaluation prior to disposition, multiple bedside re-evaluations, IV medications, cardiac monitoring and continuous pulse oximetry    This patient has remained hemodynamically stable during their ED course. Diagnosis:  1. Acute respiratory failure with hypoxia (HCC) New Problem   2. Acute on chronic congestive heart failure, unspecified heart failure type ([address] Inadequately Controlled   3. Hypertension, unspecified type        Disposition:  Patient's disposition: Admit to telemetry  Patient's condition is stable.          [address] DO  Resident  07/15/21 3901

## 2021-07-16 PROBLEM — J06.9 ACUTE RESPIRATORY DISEASE DUE TO 2019 NOVEL CORONAVIRUS: Status: RESOLVED | Noted: 2021-04-20 | Resolved: 2021-07-16

## 2021-07-16 PROBLEM — J44.1 COPD EXACERBATION (HCC): Status: RESOLVED | Noted: 2019-04-10 | Resolved: 2021-07-16

## 2021-07-16 PROBLEM — U07.1 ACUTE RESPIRATORY DISEASE DUE TO 2019 NOVEL CORONAVIRUS: Status: RESOLVED | Noted: 2021-04-20 | Resolved: 2021-07-16

## 2021-07-16 PROBLEM — Z86.73 HISTORY OF STROKE: Status: ACTIVE | Noted: 2019-04-03

## 2021-07-16 LAB
ALBUMIN SERPL-MCNC: 3.4 G/DL (ref 3.5–5.2)
ALP BLD-CCNC: 78 U/L (ref 35–104)
ALT SERPL-CCNC: 11 U/L (ref 0–32)
ANION GAP SERPL CALCULATED.3IONS-SCNC: 8 MMOL/L (ref 7–16)
ANISOCYTOSIS: ABNORMAL
AST SERPL-CCNC: 9 U/L (ref 0–31)
B.E.: 9.7 MMOL/L (ref -3–3)
BASOPHILIC STIPPLING: ABNORMAL
BASOPHILS ABSOLUTE: 0.01 E9/L (ref 0–0.2)
BASOPHILS RELATIVE PERCENT: 0.1 % (ref 0–2)
BILIRUB SERPL-MCNC: 0.3 MG/DL (ref 0–1.2)
BUN BLDV-MCNC: 17 MG/DL (ref 6–23)
CALCIUM SERPL-MCNC: 9 MG/DL (ref 8.6–10.2)
CHLORIDE BLD-SCNC: 102 MMOL/L (ref 98–107)
CO2: 32 MMOL/L (ref 22–29)
COHB: 2.6 % (ref 0–1.5)
CREAT SERPL-MCNC: 0.9 MG/DL (ref 0.5–1)
CRITICAL: ABNORMAL
DATE ANALYZED: ABNORMAL
DATE OF COLLECTION: ABNORMAL
EKG ATRIAL RATE: 89 BPM
EKG P AXIS: 46 DEGREES
EKG P-R INTERVAL: 186 MS
EKG Q-T INTERVAL: 450 MS
EKG QRS DURATION: 144 MS
EKG QTC CALCULATION (BAZETT): 547 MS
EKG R AXIS: 57 DEGREES
EKG T AXIS: 11 DEGREES
EKG VENTRICULAR RATE: 89 BPM
EOSINOPHILS ABSOLUTE: 0 E9/L (ref 0.05–0.5)
EOSINOPHILS RELATIVE PERCENT: 0 % (ref 0–6)
GFR AFRICAN AMERICAN: >60
GFR NON-AFRICAN AMERICAN: >60 ML/MIN/1.73
GLUCOSE BLD-MCNC: 197 MG/DL (ref 74–99)
HBA1C MFR BLD: 6.9 % (ref 4–5.6)
HCO3: 37.3 MMOL/L (ref 22–26)
HCT VFR BLD CALC: 39.8 % (ref 34–48)
HEMOGLOBIN: 11.8 G/DL (ref 11.5–15.5)
HHB: 6.4 % (ref 0–5)
HYPOCHROMIA: ABNORMAL
IMMATURE GRANULOCYTES #: 0.02 E9/L
IMMATURE GRANULOCYTES %: 0.3 % (ref 0–5)
LAB: ABNORMAL
LYMPHOCYTES ABSOLUTE: 0.38 E9/L (ref 1.5–4)
LYMPHOCYTES RELATIVE PERCENT: 5.6 % (ref 20–42)
Lab: ABNORMAL
MCH RBC QN AUTO: 29.8 PG (ref 26–35)
MCHC RBC AUTO-ENTMCNC: 29.6 % (ref 32–34.5)
MCV RBC AUTO: 100.5 FL (ref 80–99.9)
METER GLUCOSE: 166 MG/DL (ref 74–99)
METER GLUCOSE: 170 MG/DL (ref 74–99)
METER GLUCOSE: 195 MG/DL (ref 74–99)
METER GLUCOSE: 258 MG/DL (ref 74–99)
METHB: 0.3 % (ref 0–1.5)
MODE: ABNORMAL
MONOCYTES ABSOLUTE: 0.1 E9/L (ref 0.1–0.95)
MONOCYTES RELATIVE PERCENT: 1.5 % (ref 2–12)
NEUTROPHILS ABSOLUTE: 6.3 E9/L (ref 1.8–7.3)
NEUTROPHILS RELATIVE PERCENT: 92.5 % (ref 43–80)
O2 CONTENT: 15.7 ML/DL
O2 SATURATION: 93.4 % (ref 92–98.5)
O2HB: 90.7 % (ref 94–97)
OPERATOR ID: 1394
PATIENT TEMP: 37 C
PCO2: 66 MMHG (ref 35–45)
PDW BLD-RTO: 14.3 FL (ref 11.5–15)
PH BLOOD GAS: 7.37 (ref 7.35–7.45)
PLATELET # BLD: 288 E9/L (ref 130–450)
PMV BLD AUTO: 10.6 FL (ref 7–12)
PO2: 72.8 MMHG (ref 75–100)
POIKILOCYTES: ABNORMAL
POLYCHROMASIA: ABNORMAL
POTASSIUM REFLEX MAGNESIUM: 4 MMOL/L (ref 3.5–5)
RBC # BLD: 3.96 E12/L (ref 3.5–5.5)
SODIUM BLD-SCNC: 142 MMOL/L (ref 132–146)
SOURCE, BLOOD GAS: ABNORMAL
STOMATOCYTES: ABNORMAL
THB: 12.3 G/DL (ref 11.5–16.5)
TIME ANALYZED: 934
TOTAL PROTEIN: 6.9 G/DL (ref 6.4–8.3)
WBC # BLD: 6.8 E9/L (ref 4.5–11.5)

## 2021-07-16 PROCEDURE — 2580000003 HC RX 258: Performed by: INTERNAL MEDICINE

## 2021-07-16 PROCEDURE — 2060000000 HC ICU INTERMEDIATE R&B

## 2021-07-16 PROCEDURE — 94640 AIRWAY INHALATION TREATMENT: CPT

## 2021-07-16 PROCEDURE — 6370000000 HC RX 637 (ALT 250 FOR IP): Performed by: INTERNAL MEDICINE

## 2021-07-16 PROCEDURE — 6360000002 HC RX W HCPCS: Performed by: INTERNAL MEDICINE

## 2021-07-16 PROCEDURE — 2700000000 HC OXYGEN THERAPY PER DAY

## 2021-07-16 PROCEDURE — 94660 CPAP INITIATION&MGMT: CPT

## 2021-07-16 PROCEDURE — 82805 BLOOD GASES W/O2 SATURATION: CPT

## 2021-07-16 PROCEDURE — 83036 HEMOGLOBIN GLYCOSYLATED A1C: CPT

## 2021-07-16 PROCEDURE — 2500000003 HC RX 250 WO HCPCS: Performed by: NURSE PRACTITIONER

## 2021-07-16 PROCEDURE — 97161 PT EVAL LOW COMPLEX 20 MIN: CPT

## 2021-07-16 PROCEDURE — 93010 ELECTROCARDIOGRAM REPORT: CPT | Performed by: INTERNAL MEDICINE

## 2021-07-16 PROCEDURE — 80053 COMPREHEN METABOLIC PANEL: CPT

## 2021-07-16 PROCEDURE — 85025 COMPLETE CBC W/AUTO DIFF WBC: CPT

## 2021-07-16 PROCEDURE — 82962 GLUCOSE BLOOD TEST: CPT

## 2021-07-16 PROCEDURE — 36415 COLL VENOUS BLD VENIPUNCTURE: CPT

## 2021-07-16 PROCEDURE — 97530 THERAPEUTIC ACTIVITIES: CPT

## 2021-07-16 PROCEDURE — 97110 THERAPEUTIC EXERCISES: CPT

## 2021-07-16 PROCEDURE — APPSS180 APP SPLIT SHARED TIME > 60 MINUTES: Performed by: NURSE PRACTITIONER

## 2021-07-16 PROCEDURE — 99222 1ST HOSP IP/OBS MODERATE 55: CPT | Performed by: INTERNAL MEDICINE

## 2021-07-16 PROCEDURE — 97165 OT EVAL LOW COMPLEX 30 MIN: CPT

## 2021-07-16 RX ORDER — METHYLPREDNISOLONE SODIUM SUCCINATE 40 MG/ML
40 INJECTION, POWDER, LYOPHILIZED, FOR SOLUTION INTRAMUSCULAR; INTRAVENOUS EVERY 12 HOURS
Status: DISCONTINUED | OUTPATIENT
Start: 2021-07-16 | End: 2021-07-18

## 2021-07-16 RX ORDER — ARFORMOTEROL TARTRATE 15 UG/2ML
15 SOLUTION RESPIRATORY (INHALATION) 2 TIMES DAILY
Status: DISCONTINUED | OUTPATIENT
Start: 2021-07-16 | End: 2021-07-21 | Stop reason: HOSPADM

## 2021-07-16 RX ORDER — TRAMADOL HYDROCHLORIDE 50 MG/1
50 TABLET ORAL EVERY 6 HOURS PRN
Status: DISCONTINUED | OUTPATIENT
Start: 2021-07-16 | End: 2021-07-21 | Stop reason: HOSPADM

## 2021-07-16 RX ORDER — BUMETANIDE 0.25 MG/ML
1 INJECTION, SOLUTION INTRAMUSCULAR; INTRAVENOUS 2 TIMES DAILY
Status: COMPLETED | OUTPATIENT
Start: 2021-07-16 | End: 2021-07-20

## 2021-07-16 RX ORDER — IPRATROPIUM BROMIDE AND ALBUTEROL SULFATE 2.5; .5 MG/3ML; MG/3ML
1 SOLUTION RESPIRATORY (INHALATION)
Status: DISCONTINUED | OUTPATIENT
Start: 2021-07-16 | End: 2021-07-21 | Stop reason: HOSPADM

## 2021-07-16 RX ADMIN — MONTELUKAST SODIUM 10 MG: 10 TABLET, FILM COATED ORAL at 20:52

## 2021-07-16 RX ADMIN — ISOSORBIDE MONONITRATE 60 MG: 60 TABLET, EXTENDED RELEASE ORAL at 08:34

## 2021-07-16 RX ADMIN — ENOXAPARIN SODIUM 40 MG: 40 INJECTION SUBCUTANEOUS at 08:34

## 2021-07-16 RX ADMIN — TROSPIUM CHLORIDE 20 MG: 20 TABLET, FILM COATED ORAL at 06:04

## 2021-07-16 RX ADMIN — METHYLPREDNISOLONE SODIUM SUCCINATE 40 MG: 40 INJECTION, POWDER, LYOPHILIZED, FOR SOLUTION INTRAMUSCULAR; INTRAVENOUS at 08:35

## 2021-07-16 RX ADMIN — METOCLOPRAMIDE 5 MG: 5 TABLET ORAL at 16:27

## 2021-07-16 RX ADMIN — IPRATROPIUM BROMIDE AND ALBUTEROL SULFATE 1 AMPULE: 2.5; .5 SOLUTION RESPIRATORY (INHALATION) at 12:04

## 2021-07-16 RX ADMIN — TRAZODONE HYDROCHLORIDE 100 MG: 50 TABLET ORAL at 20:52

## 2021-07-16 RX ADMIN — ARFORMOTEROL TARTRATE 15 MCG: 15 SOLUTION RESPIRATORY (INHALATION) at 19:52

## 2021-07-16 RX ADMIN — BUMETANIDE 1 MG: 1 TABLET ORAL at 08:34

## 2021-07-16 RX ADMIN — CARVEDILOL 25 MG: 25 TABLET, FILM COATED ORAL at 16:27

## 2021-07-16 RX ADMIN — INSULIN LISPRO 2 UNITS: 100 INJECTION, SOLUTION INTRAVENOUS; SUBCUTANEOUS at 11:40

## 2021-07-16 RX ADMIN — BACLOFEN 10 MG: 10 TABLET ORAL at 08:33

## 2021-07-16 RX ADMIN — DOCUSATE SODIUM 100 MG: 100 CAPSULE, LIQUID FILLED ORAL at 20:53

## 2021-07-16 RX ADMIN — INSULIN GLARGINE 40 UNITS: 100 INJECTION, SOLUTION SUBCUTANEOUS at 20:56

## 2021-07-16 RX ADMIN — HYDRALAZINE HYDROCHLORIDE 50 MG: 50 TABLET, FILM COATED ORAL at 16:27

## 2021-07-16 RX ADMIN — Medication 1000 UNITS: at 08:34

## 2021-07-16 RX ADMIN — SACUBITRIL AND VALSARTAN 1 TABLET: 49; 51 TABLET, FILM COATED ORAL at 20:53

## 2021-07-16 RX ADMIN — IPRATROPIUM BROMIDE AND ALBUTEROL SULFATE 1 AMPULE: 2.5; .5 SOLUTION RESPIRATORY (INHALATION) at 16:09

## 2021-07-16 RX ADMIN — Medication 10 ML: at 08:35

## 2021-07-16 RX ADMIN — BUMETANIDE 1 MG: 0.25 INJECTION, SOLUTION INTRAMUSCULAR; INTRAVENOUS at 12:01

## 2021-07-16 RX ADMIN — DULOXETINE HYDROCHLORIDE 60 MG: 60 CAPSULE, DELAYED RELEASE ORAL at 20:52

## 2021-07-16 RX ADMIN — ARFORMOTEROL TARTRATE 15 MCG: 15 SOLUTION RESPIRATORY (INHALATION) at 08:17

## 2021-07-16 RX ADMIN — INSULIN LISPRO 3 UNITS: 100 INJECTION, SOLUTION INTRAVENOUS; SUBCUTANEOUS at 21:00

## 2021-07-16 RX ADMIN — TRAMADOL HYDROCHLORIDE 50 MG: 50 TABLET, FILM COATED ORAL at 16:35

## 2021-07-16 RX ADMIN — METOCLOPRAMIDE 5 MG: 5 TABLET ORAL at 12:01

## 2021-07-16 RX ADMIN — GABAPENTIN 100 MG: 100 CAPSULE ORAL at 20:53

## 2021-07-16 RX ADMIN — IPRATROPIUM BROMIDE AND ALBUTEROL SULFATE 1 AMPULE: 2.5; .5 SOLUTION RESPIRATORY (INHALATION) at 19:52

## 2021-07-16 RX ADMIN — METHYLPREDNISOLONE SODIUM SUCCINATE 40 MG: 40 INJECTION, POWDER, LYOPHILIZED, FOR SOLUTION INTRAMUSCULAR; INTRAVENOUS at 20:53

## 2021-07-16 RX ADMIN — Medication 10 ML: at 21:02

## 2021-07-16 RX ADMIN — HYDRALAZINE HYDROCHLORIDE 50 MG: 50 TABLET, FILM COATED ORAL at 20:52

## 2021-07-16 RX ADMIN — BUMETANIDE 1 MG: 0.25 INJECTION, SOLUTION INTRAMUSCULAR; INTRAVENOUS at 20:53

## 2021-07-16 RX ADMIN — HYDRALAZINE HYDROCHLORIDE 50 MG: 50 TABLET, FILM COATED ORAL at 06:04

## 2021-07-16 RX ADMIN — CARVEDILOL 25 MG: 25 TABLET, FILM COATED ORAL at 08:34

## 2021-07-16 RX ADMIN — DULOXETINE HYDROCHLORIDE 60 MG: 60 CAPSULE, DELAYED RELEASE ORAL at 08:34

## 2021-07-16 RX ADMIN — INSULIN LISPRO 2 UNITS: 100 INJECTION, SOLUTION INTRAVENOUS; SUBCUTANEOUS at 07:32

## 2021-07-16 RX ADMIN — METOCLOPRAMIDE 5 MG: 5 TABLET ORAL at 06:03

## 2021-07-16 RX ADMIN — SPIRONOLACTONE 25 MG: 25 TABLET ORAL at 08:34

## 2021-07-16 RX ADMIN — ASPIRIN 81 MG: 81 TABLET, CHEWABLE ORAL at 08:34

## 2021-07-16 RX ADMIN — GABAPENTIN 100 MG: 100 CAPSULE ORAL at 08:34

## 2021-07-16 RX ADMIN — SACUBITRIL AND VALSARTAN 1 TABLET: 49; 51 TABLET, FILM COATED ORAL at 08:34

## 2021-07-16 RX ADMIN — INSULIN LISPRO 2 UNITS: 100 INJECTION, SOLUTION INTRAVENOUS; SUBCUTANEOUS at 16:27

## 2021-07-16 RX ADMIN — TROSPIUM CHLORIDE 20 MG: 20 TABLET, FILM COATED ORAL at 16:27

## 2021-07-16 ASSESSMENT — PAIN DESCRIPTION - ORIENTATION
ORIENTATION: LOWER
ORIENTATION: LOWER

## 2021-07-16 ASSESSMENT — PAIN SCALES - GENERAL
PAINLEVEL_OUTOF10: 10
PAINLEVEL_OUTOF10: 0
PAINLEVEL_OUTOF10: 3
PAINLEVEL_OUTOF10: 0

## 2021-07-16 ASSESSMENT — PAIN DESCRIPTION - LOCATION
LOCATION: BACK
LOCATION: BACK

## 2021-07-16 ASSESSMENT — PAIN DESCRIPTION - PAIN TYPE
TYPE: CHRONIC PAIN
TYPE: CHRONIC PAIN

## 2021-07-16 NOTE — PROGRESS NOTES
Pharmacy Note    An order for Zofran has been discontinued for this patient due to the risk of QT prolongation. If an antiemetic is indicated for your patient, please consider use of Tigan or Compazine. Current QTc = 547  Please contact the Pharmacy with any questions.   ROBERTA Blas [facility]  7/15/2021  10:33 PM

## 2021-07-16 NOTE — PROGRESS NOTES
7:15 AM  Consult placed through [facility] text to [id number] [facility].   Rolando Shannon, [facility]/UC[facility]  7/16/2021    READ @ 7:16 AM

## 2021-07-16 NOTE — CARE COORDINATION
7/16/2021 Social Work Discharge Planning: This worker met with Pt to discuss  role and transition of care/discharge planning. Pt resides alone in a 1 story home with 2 steps. Pt has a very supportive grandson close by. Pt is on 4l o2 here and uses 3l via [employer]. Wean o2. Pt also has a cane, rollator, trilogy and glucometer and supplies. No nebulizer. Pt is active with [facility] and will need VIRGIE order at discharge if appropriate. Waiting Pt eval.Pt prefers to return home. Pt also recieves aide services 7 days a week. Pharmacy is [facility] on [facility].  Electronically signed by SINDHU West on 7/16/2021 at 9:54 AM

## 2021-07-16 NOTE — H&P
Internal Medicine History & Physical     Name: Adelaida Chow  : 1955  Chief Complaint: Shortness of Breath (was seen at pcp for follow up after hospital discharge in  for bronchitis, sent by pcp for low pulse ox today), Cough, and Foot Injury (right, dropped frozen chicken on foot)  Primary Care Physician: Viktoriya Madsen DO  Admission date: 7/15/2021  Date of service: 2021     History of Present Illness  Litzy Joe is a 76y.o. year old female. She presented to the hospital the chief complaint of hypoxia. She states that she had Covid in April. She was requiring some oxygen since then. She was discharged and went home. In  she had a low oxygen level and was treated for bronchitis. She saw her PCP yesterday for the first time and she had a low oxygen level and was sent to the hospital.  She is still weak. She is short of breath. She has pain in her legs and back. She does not wear oxygen at home. Exertion makes her symptoms worse. Nothing took makes her symptoms better. She does wear trilogy at night with oxygen bleed in. No family or friends present at bedside. History is provided by the patient. She is felt to be a fair historian. She did fall asleep at times when talking to me. ED course:   Initial blood work and imaging studies performed. Admission recommended by ED physician. Case  discussed with ED provider.  Meds in ED consisted of the following:  Medications   ipratropium-albuterol (DUONEB) nebulizer solution 3 mL (3 mLs Inhalation Given 7/15/21 1508)   sodium chloride flush 0.9 % injection (10 mLs  Given 7/15/21 1648)   nitroGLYCERIN (NITROSTAT) SL tablet 0.4 mg (0.4 mg Sublingual Given 7/15/21 1643)   bumetanide (BUMEX) injection 1 mg (1 mg Intravenous Given 7/15/21 1648)   nitroGLYCERIN (NITROSTAT) SL tablet 0.4 mg (0.4 mg Sublingual Given 7/15/21 174)   hydrALAZINE (APRESOLINE) injection 10 mg (10 mg Intravenous Given 7/15/21 174)       Past Medical History: Diagnosis Date    Asthma     Atherosclerosis of native artery of left leg with rest pain ([address] 11/9/2018    C. difficile diarrhea 2015    CAD (coronary artery disease) 2011    Cellulitis and abscess of trunk 4/14/2015    Cerebellar infarct ([address] 4/3/2019    Remote, rt. cerebellum, head CT scan, 1/9/19    Chronic back pain     Chronic kidney disease     Chronic systolic congestive heart failure ([address] 10/4/2013    Chronic venous insufficiency 10/11/2017    COPD (chronic obstructive pulmonary disease) ([address]     Depression     Diabetes mellitus ([address]     Diabetic neuropathy ([address]     Diverticulosis     Fatty liver 1/8/2016    per US    Glaucoma, open angle 2/1/2016    Mild-OU    Hepatic encephalopathy ([address] 02/07/2016    resolved    Hiatal hernia     History of blood transfusion     Hyperlipidemia     Hyperplastic colon polyp     Hypertension     Incontinence     Liver mass     Morbid obesity ([address]     Movement disorder     Myocardial infarction ([address] 2011    Osteoarthritis, generalized     Pain in both lower legs 10/11/2017    Peripheral vascular disease ([address]     Pneumonia 1/26/2014    Pulmonary edema     resolved    PVD (peripheral vascular disease) with claudication ([address] 8/30/2017    Sleep apnea     uses BI PAP    Status post peripheral artery angioplasty 10/31/2019    Tobacco abuse     Tobacco abuse 10/11/2017    Tubular adenoma polyp of rectum     Urinary tract infection due to ESBL Klebsiella 03/08/2017    Ventral hernia        Past Surgical History:   Procedure Laterality Date    ANGIOPLASTY  11/13/2018    Dr. White Less & athrectomy L SFA & Popliteal    BREAST SURGERY  2000    bilateral reduction    BRONCHIAL BRUSH BIOPSY  1/25/2013    Dr Yariel Jang  04/20/2015    Dr. Alfa López  12/2011     DR. Emily Tan,  follows Dr Farrukh Rudolph  11/15/2013    Dr Christi Narvaez 12/23/2016    Dr Andriy Mcgarry adenoma & hyperplastic polyps, melanosis coli (repeat one year 12/2017)    CORONARY ARTERY BYPASS GRAFT      ECHO COMPLETE  10/9/2013         ENDOSCOPY, COLON, DIAGNOSTIC  04/18/2017    GALLBLADDER SURGERY      gallstones removed, [facility] 8/2017    HYSTERECTOMY      JOINT REPLACEMENT  2011    LEFT KNEE    KNEE SURGERY      left knee replacement    LAYER WOUND CLOSURE Left [id number]   Maryanne Stabs LIVER BIOPSY  1/8/2016    [facility]    POLYSOMNOGRAPHY  1/2016    [facility]    UPPER GASTROINTESTINAL ENDOSCOPY  12/20/12    UPPER GASTROINTESTINAL ENDOSCOPY  12/23/2016    Dr Bravo Perkins UPPER GASTROINTESTINAL ENDOSCOPY  02/08/2017       Family History   Problem Relation Age of Onset    Cancer Mother     Asthma Father        Social History  Patient lives at home alone, but she does have home aides that come 6 hours a day 7 days a week. Employment: None  Illicit drug use- denies  TOBACCO:   reports that she has been smoking cigarettes. She started smoking about 46 years ago. She has a 10.00 pack-year smoking history. She has never used smokeless tobacco.  ETOH:   reports current alcohol use. Home Medications  Prior to Admission medications    Medication Sig Start Date End Date Taking? Authorizing Provider   guaiFENesin 400 MG tablet Take 1 tablet by mouth 2 times daily as needed for Cough 7/14/21   Pauline Hayes,    aspirin (ASPIRIN LOW DOSE) 81 MG chewable tablet TAKE 1 TABLET BY MOUTH EVERY DAY 6/25/21   Pauline Hayes DO   miconazole (MICOTIN) 2 % powder Apply topically 2 times daily.  6/16/21   Marcia Stoner MD   sacubitril-valsartan (ENTRESTO) 49-51 MG per tablet Take 1 tablet by mouth 2 times daily 6/16/21   Marcia Stoner MD   carvedilol (COREG) 25 MG tablet Take 1 tablet by mouth 2 times daily (with meals) 6/16/21   Marcia Stoner MD   hydrALAZINE (APRESOLINE) 50 MG tablet Take 1 tablet by mouth every 8 hours 6/16/21   Marcia Stoner MD   DULoxetine (CYMBALTA) 60 MG extended release capsule TAKE ONE (1) CAPSULE BY MOUTH TWICE DAILY 6/2/21   Sal Miri Hayes, DO   montelukast (SINGULAIR) 10 MG tablet TAKE (1) TABLET BY MOUTH NIGHTLY 6/1/21   Sal Miri Hayes, DO   fluticasone (FLONASE) 50 MCG/ACT nasal spray USE 1 SPRAY IN EACH NOSTRIL TWICE A DAY 5/11/21   Sal Miri Hayes, DO   MYRBETRIQ 50 MG TB24 TAKE 1 TABLET BY MOUTH EVERY DAY 5/10/21   Sal Miri Hayes, DO   metoclopramide (REGLAN) 5 MG tablet Take 1 tablet by mouth 3 times daily 5/5/21   [facility] Miri Hayes, DO   gabapentin (NEURONTIN) 100 MG capsule Take 1 capsule by mouth 2 times daily for 180 days.  Intended supply: 30 days 5/4/21 10/31/21  Sal Hayes,    Blood Pressure Monitoring (B-D ASSURE BPM/AUTO WRIST CUFF) MISC Use daily 5/4/21   Sal Miri Hayes, DO   Acetaminophen (TYLENOL) 325 MG CAPS Take 325 mg by mouth every 6 hours as needed (pain) 5/4/21   [facility] Miri Hayes, DO   insulin lispro, 1 Unit Dial, ([facility]) 100 UNIT/ML SOPN Use sliding scale as below TID with meals Sugar  0 units Sugar 131-180 2 units Sugar 181-240 4 units Sugar 241-300 6 units Sugar 301-350 8 units Sugar 351-400 10 units Sugar > 400 12 units Call above 500 4/29/21   Salleah Hayes, DO   vitamin D3 (CHOLECALCIFEROL) 25 MCG (1000 UT) TABS tablet TAKE 1 TABLET BY MOUTH EVERY DAY 4/26/21   Sal Miri Hayes, DO   Insulin Degludec (TRESIBA FLEXTOUCH) 200 UNIT/ML SOPN INJECT 50 UNITS INTO THE SKIN nightly 4/15/21   Salleah Hayes, DO   cetirizine (ZYRTEC) 10 MG tablet TAKE 1 TABLET BY MOUTH EVERY DAY 4/13/21   Sal Miri Hayes, DO   baclofen (LIORESAL) 10 MG tablet TAKE 1 TABLET BY MOUTH TWICE A DAY AS NEEDED  Patient taking differently: Take 10 mg by mouth 2 times daily as needed  4/6/21   Sal Hayes DO   spironolactone (ALDACTONE) 25 MG tablet Take 1 tablet by mouth daily 3/16/21   Lani Henao MD   docusate sodium (COLACE) 100 MG capsule TAKE 2 CAPSULES BY MOUTH AT BEDTIME 3/11/21   Historical Provider, MD Alcohol Swabs ([facility] ALCOHOL PREP SWABS) 70 % PADS Use daily 12/29/20   Conrad Hayes DO   blood glucose test strips (ACCU-CHEK GUIDE) strip 1 each by In Vitro route 3 times daily 12/29/20   Conrad Hayes DO   Accu-Chek FastClix Lancets MISC Use 4x daily 12/28/20   Conrad Hayes DO   SPIRIVA RESPIMAT 1.25 MCG/ACT AERS inhaler INHALE TWO (2) PUFFS BY MOUTH EVERY DAY 11/24/20   Conrad Hayes DO   isosorbide mononitrate (IMDUR) 60 MG extended release tablet TAKE (1) TABLET BY MOUTH DAILY 11/18/20   Jazlyn Ovalles MD   bumetanide (BUMEX) 1 MG tablet TAKE (1) TABLET BY MOUTH DAILY 10/13/20   Conrad Hayes, DO   traZODone (DESYREL) 100 MG tablet TAKE (1) TABLET BY MOUTH NIGHTLY 10/13/20   Conrad Hayes, DO   simvastatin (ZOCOR) 20 MG tablet TAKE 1 TABLET BY MOUTH NIGHTLY 10/6/20   Jazlyn Ovalles MD   azelastine (ASTELIN) 0.1 % nasal spray 1 spray by Nasal route 2 times daily Use in each nostril as directed 9/16/20   Olive Diaz DO   Handicap Placard MISC by Does not apply route Patient cannot walk 200 ft without stopping to rest.    Expiration 8/2022 7/30/20   Conrad Hayes DO   Blood Glucose Monitoring Suppl (ACCU-CHEK GUIDE ME) w/Device KIT Use as directed 4/10/20   Conrad Hayes DO   BiPAP Machine MISC by Does not apply route nightly 27/23    Harini Julian MD       Allergies  Allergies   Allergen Reactions    Latex Hives    Bee Venom Anaphylaxis    Dilaudid [Hydromorphone Hcl] Itching    Dye [Iodides] Hives and Shortness Of Breath    Percocet [Oxycodone-Acetaminophen] Shortness Of Breath and Itching    Keflex [Cephalexin] Itching and Rash    Lasix [Furosemide] Other (See Comments)     Pt states she cramps up and gets headaches    Levaquin [Levofloxacin In D5w] Hives    Lipitor      MUSCLE SPASMS    Lyrica [Pregabalin]      Dream disturbances    Morphine Hives    Naproxen      Unsure of reaction;pt states able to take Aleve without difficulties  Nefazodone Other (See Comments)    Norvasc [Amlodipine] Swelling    Oxycodone-Acetaminophen Swelling    Shellfish-Derived Products     Trazodone And Nefazodone        Review of Systems  Please see HPI above. All bolded are positive. All un-bolded are negative.   Constitutional Symptoms: fever, chills, fatigue, generalized weakness, diaphoresis, increase in thirst, loss of appetite  Eyes: vision change   Ears, Nose, Mouth, Throat: hearing loss, nasal congestion, sores in the mouth  Cardiovascular: chest pain, chest heaviness, palpitations  Respiratory: shortness of breath, wheezing, coughing  Gastrointestinal: abdominal pain, nausea, vomiting, diarrhea, constipation, melena, hematochezia, hematemesis  Genitourinary: dysuria, hematuria, increased frequency  Musculoskeletal: lower extremity edema, myalgias, arthralgias, back pain  Integumentary: rashes, itching   Neurological: headache, lightheadedness, dizziness, confusion, syncope, numbness, tingling, focal weakness  Psychiatric: depression, suicidal ideation, anxiety  Endocrine: unintentional weight change  Hematologic/Lymphatic: lymphadenopathy, easy bruising, easy bleeding   Allergic/Immunologic: recurrent infections      Objective  VITALS:  BP (!) 164/82   Pulse 106   Temp 97.9 °F (36.6 °C) (Oral)   Resp 18   Ht 5' 1\" (1.549 m)   Wt 200 lb 8 oz (90.9 kg)   LMP 01/01/1990   SpO2 94%   BMI 37.88 kg/m²     Physical Exam:  General: awake, alert, oriented to person, place, time, and purpose, appears stated age, cooperative, no acute distress, pleasant, appropriate mood, nasal cannula oxygen, seems to be somewhat somnolent in the middle of conversation  Eyes: conjunctivae/corneas clear, sclera non icteric, EOMI  Ears: no obvious scars, no lesions, no masses, hearing intact  Mouth: mucous membranes moist, no obvious oral sores  Head: normocephalic, atraumatic  Neck: no JVD, no adenopathy, no thyromegaly, neck is supple, trachea is midline  Back: ROM normal, no CVA tenderness. Chest: no pain on palpation  Lungs: Diminished but clear to auscultation bilaterally, without rhonchi, crackle, wheezing, or rale, no retractions or use of accessory muscles  Heart: regular rate and regular rhythm, no murmur, normal S1, S2  Abdomen: Massively obese, soft, non-tender; bowel sounds normal; no masses, no organomegaly  : Deferred   Extremities: no lower extremity edema, extremities atraumatic, no cyanosis, no clubbing, 2+ pedal pulses palpated  Skin: normal color, normal texture, normal turgor, no rashes, no lesions  Neurologic:5/5 muscle strength throughout, normal muscle tone throughout, face symmetric, hearing intact, tongue midline, speech appropriate without slurring, sensation to fine touch intact in upper and lower extremities    Labs-   Lab Results   Component Value Date    WBC 7.4 07/15/2021    HGB 11.2 (L) 07/15/2021    HCT 36.9 07/15/2021     07/15/2021     07/16/2021    K 4.0 07/16/2021     07/16/2021    CREATININE 0.9 07/16/2021    BUN 17 07/16/2021    CO2 32 (H) 07/16/2021    GLUCOSE 197 (H) 07/16/2021    ALT 11 07/16/2021    AST 9 07/16/2021    INR 1.1 04/19/2021     Lab Results   Component Value Date    CKTOTAL 88 01/05/2016    CKMB 1.2 01/05/2016    TROPONINI <0.01 04/19/2021       Last echocardiogram:6/9/21   Left ventricle size is normal.   Ejection fraction is visually estimated at 55-60%. No regional wall motion abnormalities seen. Severe concentric left ventricular hypertrophy. Indeterminate diastolic function. Normal right ventricular size. Right ventricle global systolic function is mildly reduced . TAPSE 15 mm. No hemodynamically significant aortic stenosis is present. Physiologic and/or trace tricuspid regurgitation. RVSP is 26 mmHg. Normal estimated PA systolic pressure. No evidence for hemodynamically significant pericardial effusion.     Recent Radiological Studies:  XR CHEST PORTABLE   Final Result Opacities may represent moderate pulmonary edema. Multifocal pneumonia may   also be considered in the proper clinical setting. Small left-sided pleural effusion. XR FOOT RIGHT (2 VIEWS)   Final Result   No acute osseous abnormality. Soft tissue swelling.              Assessment  Active Hospital Problems    Diagnosis     Acute respiratory failure with hypoxia ([facility]) [J96.01]      Priority: High    Uncontrolled hypertension [I10]      Priority: Medium    Chronic respiratory failure with hypoxia and hypercapnia ([facility]) [J96.11, J96.12]      Priority: Medium    DM2 (diabetes mellitus, type 2) ([facility]) [E11.9]      Priority: Medium    Chronic combined systolic and diastolic heart failure (HCC) [I50.42]      Priority: Medium    DAYAN and COPD overlap syndrome ([facility]) [G47.33, J44.9]      Priority: Medium    Acute on chronic congestive heart failure (HCC) [I50.9]     Status post peripheral artery angioplasty [Z98.62]     COPD (chronic obstructive pulmonary disease) ([facility] [J44.9]     History of stroke [Z86.73]     History of non-ST elevation myocardial infarction (NSTEMI) [I25.2]     Chronic venous insufficiency [I87.2]     PVD (peripheral vascular disease) with claudication ([facility] [I73.9]     Ischemic cardiomyopathy [I25.5]     Coronary artery disease involving native coronary artery of native heart without angina pectoris [I25.10]     Cigarette smoker [F17.210]     Hiatal hernia [K44.9]     Non-compliance [Z91.19]     Spondylosis 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 [M47.816]     Lumbar stenosis [M48.061]     Glaucoma, open angle [H40.10X0]     Chronic back pain - d/t muscle spasm [M54.9, G89.29]     Chronic passive hepatic congestion [K76.1]     Major depressive disorder, recurrent episode, mild ([facility]) [F33.0]     Diabetic polyneuropathy associated with type 2 diabetes mellitus ([facility] [E11.42]     GERD (gastroesophageal reflux disease) [K21.9]     Vitamin D insufficiency [E55.9]     Morbid obesity due to excess calories ([facility] [E66.01]     Hyperlipidemia [E78.5]        Patient Active Problem List    Diagnosis Date Noted    Acute respiratory failure with hypoxia [facility]      Priority: High    Uncontrolled hypertension 03/12/2021     Priority: Medium    Chronic respiratory failure with hypoxia and hypercapnia (HCC) 04/10/2019     Priority: Medium    DM2 (diabetes mellitus, type 2) ([facility] 01/09/2017     Priority: Medium    Chronic combined systolic and diastolic heart failure ([facility] 10/04/2013     Priority: Medium     Cath Jan 2013-   EF 30%, global LV hypokinesis  plaque in distal left main, 50% LAD, circumflex  Mean pulmonary pressure 37      DAYAN and COPD overlap syndrome ([facility] 09/13/2013     Priority: Medium     On bipap 22/18, f/u Dr Yue Middleton Acute on chronic congestive heart failure ([facility]     Status post peripheral artery angioplasty 10/31/2019    COPD (chronic obstructive pulmonary disease) ([facility] 09/19/2019    History of stroke 04/03/2019     Remote, rt. cerebellum, head CT scan, 1/9/19      History of non-ST elevation myocardial infarction (NSTEMI)     QT prolongation     Chronic venous insufficiency 10/11/2017    PVD (peripheral vascular disease) with claudication ([facility] 08/30/2017    Ischemic cardiomyopathy 03/09/2017    Coronary artery disease involving native coronary artery of native heart without angina pectoris 01/09/2017    Cigarette smoker 01/09/2017    Marijuana use, smoked 01/09/2017    Hiatal hernia 12/29/2016     Per EGD 12/23/16 Dr Emily Quinonez      Melanosis coli 12/23/2016     Per C-scope 12/23/2016 Dr Emily Quinonez      Non-compliance 06/09/2016    Lumbar stenosis 05/10/2016    Spondylosis 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 05/10/2016    Lumbar disc herniation 05/10/2016    Asymmetric septal hypertrophy ([facility] 04/15/2016     Per echo 4/15/16      Elevated CA 19-9 level 02/25/2016    Glaucoma, open angle 02/01/2016     Mild-OU      Chronic back pain - d/t muscle spasm      was d/c from PM for positive THC in UDS      Mixed incontinence urge and stress     Chronic passive hepatic congestion 11/12/2015    Major depressive disorder, recurrent episode, mild ([facility] 10/30/2015    Diabetic polyneuropathy associated with type 2 diabetes mellitus ([facility] 10/30/2015    GERD (gastroesophageal reflux disease) 08/12/2015    Vitamin D insufficiency 09/23/2013    Morbid obesity due to excess calories ([facility] 09/13/2013    Hyperlipidemia 09/13/2013       Plan  · Multifactorial hypoxia: Wean O2 as able. Pulmonology following. Trilogy as at home at night. COPD exacerbation: Bronchodilators. IV steroids. Tobacco cessation education. CHF: Telemetry. Echo noted. Cardiology input appreciated. Follow I&O's. IV diuresis. Continue cardiac meds. · Continue home medications  · PT/OT  · Follow labs  · DVT prophylaxis. · Please see orders for further management and care. ·  for discharge planning  · Discharge plan: TBD pending clinical improvement     Marvin Del Angel DO  7/16/2021  12:20 PM    I can be reached through [employer]. NOTE:  This report was transcribed using voice recognition software. Every effort was made to ensure accuracy; however, inadvertent computerized transcription errors may be present.

## 2021-07-16 NOTE — PROGRESS NOTES
OCCUPATIONAL THERAPY INITIAL EVALUATION    DIONISIO [facility] Jose De Jesus Levine, Susan. \Hospital Has a New Name and Outlook.\""'[facility]  Marlo Chávez[address]         Date:2021                                                  Patient Name: Adelaida Chow    MRN: [id number]    : 1955    Room: [address] [place] [address]      Evaluating OT: Shakaмарина Grace, OTR/L [id number]      Referring Provider:Marvin España DO    Specific Provider Orders/Date: OT eval and treat 7-      Diagnosis: Acuter respiratory failure with hypoxia     Surgery:   Past Surgical History:   Procedure Laterality Date    ANGIOPLASTY  2018    Dr. Cleo Jo & athrectomy L SFA & Popliteal    BREAST SURGERY      bilateral reduction    BRONCHIAL BRUSH BIOPSY  2013    Dr Urbano Larsen  2015    Dr. Isaiah Perez  2011     DR. Odilia White,  follows Dr Cassie Alejandre  11/15/2013    Dr Anabel Gore COLONOSCOPY  2016    Dr Iesha Crum adenoma & hyperplastic polyps, melanosis coli (repeat one year 2017)    CORONARY ARTERY BYPASS GRAFT      ECHO COMPLETE  10/9/2013         ENDOSCOPY, COLON, DIAGNOSTIC  2017    GALLBLADDER SURGERY      gallstones removed, [facility] 2017    HYSTERECTOMY      JOINT REPLACEMENT      LEFT KNEE    KNEE SURGERY      left knee replacement    LAYER WOUND CLOSURE Left [id number]   [employer] LIVER BIOPSY  2016    [facility]    POLYS[facility]GRAPHY  2016    [facility]    UPPER GASTROINTESTINAL ENDOSCOPY  12    UPPER GASTROINTESTINAL ENDOSCOPY  2016    Dr Anabel Gore UPPER GASTROINTESTINAL ENDOSCOPY  2017        Pertinent Medical History: CAD, Cellulitis, COPD, OA, CHF      Past Medical History:   Diagnosis Date    Asthma     Atherosclerosis of native artery of left leg with rest pain ([facility] 2018    C. difficile diarrhea     CAD (coronary artery disease)     Cellulitis and abscess of trunk 2015  Cerebellar infarct ([facility] 4/3/2019    Remote, rt. cerebellum, head CT scan, 1/9/19    Chronic back pain     Chronic kidney disease     Chronic systolic congestive heart failure ([address] 10/4/2013    Chronic venous insufficiency 10/11/2017    COPD (chronic obstructive pulmonary disease) ([address]     Depression     Diabetes mellitus ([address]     Diabetic neuropathy ([address]     Diverticulosis     Fatty liver 1/8/2016    per US    Glaucoma, open angle 2/1/2016    Mild-OU    Hepatic encephalopathy ([address] 02/07/2016    resolved    Hiatal hernia     History of blood transfusion     Hyperlipidemia     Hyperplastic colon polyp     Hypertension     Incontinence     Liver mass     Morbid obesity ([address]     Movement disorder     Myocardial infarction ([address] 2011    Osteoarthritis, generalized     Pain in both lower legs 10/11/2017    Peripheral vascular disease ([address]     Pneumonia 1/26/2014    Pulmonary edema     resolved    PVD (peripheral vascular disease) with claudication ([facility] 8/30/2017    Sleep apnea     uses BI PAP    Status post peripheral artery angioplasty 10/31/2019    Tobacco abuse     Tobacco abuse 10/11/2017    Tubular adenoma polyp of rectum     Urinary tract infection due to ESBL Klebsiella 03/08/2017    Ventral hernia          Past Surgical History:   Procedure Laterality Date    ANGIOPLASTY  11/13/2018    Dr. Gonsalo Mart & athrectomy L SFA & Popliteal    BREAST SURGERY  2000    bilateral reduction    BRONCHIAL BRUSH BIOPSY  1/25/2013    Dr Jennie Soto  04/20/2015    Dr. Brittany Davison  12/2011     DR. Clau Jason,  follows Dr Vahe Hopkins  11/15/2013    Dr Dwayne Cartagena    COLONOSCOPY  12/23/2016    Dr Nova Sales adenoma & hyperplastic polyps, melanosis coli (repeat one year 12/2017)    CORONARY ARTERY BYPASS GRAFT      ECHO COMPLETE  10/9/2013         ENDOSCOPY, COLON, DIAGNOSTIC  04/18/2017    GALLBLADDER SURGERY      gallstones removed, [facility] 8/2017    HYSTERECTOMY      JOINT REPLACEMENT  2011    LEFT KNEE    KNEE SURGERY      left knee replacement    LAYER WOUND CLOSURE Left [id number]   [employer] LIVER BIOPSY  1/8/2016    [facility]    POLYSOMNOGRAPHY  1/2016    [facility]    UPPER GASTROINTESTINAL ENDOSCOPY  12/20/12    UPPER GASTROINTESTINAL ENDOSCOPY  12/23/2016    Dr Maco Guardado   [facility] ENDOSCOPY  02/08/2017      Precautions:  Fall Risk, Bed alarm, O2 4L      Assessment of current deficits    [] Functional mobility  [x]ADLs  [x] Strength               []Cognition    [x] Functional transfers   [x] IADLs         [x] Safety Awareness   [x]Endurance    [] Fine Coordination              [x] Balance      [] Vision/perception   []Sensation     []Gross Motor Coordination  [] ROM  [] Delirium                   [] Motor Control     OT PLAN OF CARE   OT POC based on physician orders, patient diagnosis and results of clinical assessment    Frequency/Duration 2-5 days/wk for 2 weeks PRN   Specific OT Treatment Interventions to include:   * Instruction/training on adapted ADL techniques and AE recommendations to increase functional independence within precautions       * Training on energy conservation strategies, correct breathing pattern and techniques to improve independence/tolerance for self-care routine  * Functional transfer/mobility training/DME recommendations for increased independence, safety, and fall prevention  * Patient/Family education to increase follow through with safety techniques and functional independence  * Recommendation of environmental modifications for increased safety with functional transfers/mobility and ADLs  * Therapeutic exercise to improve motor endurance, ROM, and functional strength for ADLs/functional transfers  * Therapeutic activities to facilitate/challenge dynamic balance, stand tolerance for increased safety and independence with ADLs  * Therapeutic activities to facilitate gross/fine motor skills for increased independence with ADLs    Recommended Adaptive Equipment: None at present time     Home Living: Pt lives alone in a 1 story home with 2 steps with 1  Rails. Pt has aides 7 days /week for 5 hours who assist with LB dressing, bathing, and homemaking    Bathroom setup: walk in shower with a seat, hi-rise toilet   Equipment owned: [address], shower seat, Dressing equipment    Prior Level of Function: Assist with ADLs , Assist with IADLs; ambulated with Rollator  Driving: no  Occupation: not working     Pain Level: Pt indicated muscle spasms in back and R leg;  Cognition: A&O: 4/4; Follows 1-2 step directions   Memory:  adequqatre   Sequencing:  adequate   Problem solving:  adequate   Judgement/safety:  adequate     Functional Assessment:  AM-PAC Daily Activity Raw Score: 17/24   Initial Eval Status  Date: 7/16/21 Treatment Status  Date: STGs = LTGs  Time frame: 10-14 days   Feeding Independent      Grooming Contact Guard Assist   At sink with FWW to wash hands      Moderate [place]    UB Dressing Minimal Assist with gown managemnent  Independent    LB Dressing Moderate Assist   Dep socks   Minimal Assist    Bathing Moderate Assist  Minimal Assist    Toileting Contact Guard Assist    pt I with cat care   Moderate [place]    Bed Mobility  Supine to sit: Independent   Sit to supine: Independent   Supine to sit: Independent   Sit to supine: Independent    Functional Transfers Stand by Assist with FWW  Moderate [place]    Functional Mobility Stand by Assist   Pt required cues for direction as she frequently bumped into furniture on R side.  Pt reports her vision is poor due to glaucoma  Moderate [place]    Balance Sitting:     Static:  good    Dynamic:good-  Standing: SBA with FWW     Activity Tolerance Fair for light activity with 4L O2  At rest O2 sat at 93  After activity O2 sat rate at 93  Good to participate in self care tasks Visual/  Perceptual Glasses: yes                Hand Dominance R   AROM (PROM) Strength Additional Info:    RUE  WFL 4-/5 fair  and wfl FMC/dexterity noted during ADL tasks       LUE WFL 4-/5 fair  and wfl FMC/dexterity noted during ADL tasks       Hearing: [place]  Sensation:   c/o numbness or tingling in hands and feet   Tone: WFL   Edema: BLE    Comments: Upon arrival patient supine in bed . At end of session, patient supine in bed with call light and phone within reach, all lines and tubes intact. Overall patient demonstrated  decreased independence and safety during completion of ADL/functional transfer/mobility tasks. Pt would benefit from continued skilled OT to increase safety and independence with completion of ADL/IADL tasks for functional independence and quality of life. Treatment: OT treatment provided this date includes:    Instruction/training on safety and adapted techniques for completion of ADLs:     Instruction/training on safe functional mobility/transfer techniques:     Instruction/training on energy conservation/work simplification for completion of ADLs: Kit Aranda  Neuromuscular Reeducation to facilitate balance/righting reactions for increased function with ADLs tasks:        Rehab Potential: Good  for established goals     Patient / Family Goal: none stated       Patient and/or family were instructed on functional diagnosis, prognosis/goals and OT plan of care. Demonstrated good understanding.      Eval Complexity: Low    Time In: 13:40  Time Out: 14:05  Total Treatment Time: 15    Min Units   OT Eval Low 71255  15  1   OT Eval Medium 19997      OT Eval High 24295      OT Re-Eval [id number]       Therapeutic Ex 60093       Therapeutic Activities 66204  10  1   ADL/Self Care 66327       Orthotic Management 14341       Manual 25012     Neuro Re-Ed 94312       Non-Billable Time          Evaluation Time additionally includes thorough review of current medical information, gathering information on past medical history/social history and prior level of function, interpretation of standardized testing/informal observation of tasks, assessment of data and development of plan of care and goals.           Isabell Snyder, OTR/L [id number]

## 2021-07-16 NOTE — HOME CARE
PATIENT IS ACTIVE WITH [facility] FOR SN AND WILL NEED VIRGIE ORDERS UPON DISCHARGE.      Kristian Sanchez LPN   [facility] AT [facility]

## 2021-07-16 NOTE — CONSULTS
Inpatient Cardiology Consultation      Reason for Consult:  CHF    Consulting Physician: Dr. Ros Cason    Requesting Physician:  Dr. Gurpreet Banuelos    Date of Consultation: 7/16/2021    HISTORY OF PRESENT ILLNESS:   Ms. Fito Boudreaux is a 76year old  female who follows with Dr. Ros Cason. She was most recently seen in consultation with Dr. Ros Cason on 06/11/2021. Her medical history includes CAD with history of CABG in 2011, no progression of disease on cardiac catheterization 2013 and with a Lexiscan MPS 6/5/2018 showing perfusion defect involving the lateral wall, without evidence of reversibility, diffusely hypokinetic left ventricular wall motion with a focal area of akinesis involving the mid lateral wall; severe LV dysfunction (LVEF 25% on TTE 2013) now with an improved LVEF of 65% on TTE (6/5/2018). , chronic right bundle branch block, type 2 diabetes mellitus, hypertension, hyperlipidemia, morbid obesity, current smoker, marijuana use, DAYAN and severe peripheral vascular disease --status post left popliteal and tibial trunk atherectomy on 11/13/2018 (initiated on Plavix and aspirin). She was hospitalized with Covid-19 pneumonia and for/2021 and was treated with steroids and antibiotics. Ms. Fito Boudreaux presented to [facility] ED on 07/15/2021 with complaints of dyspnea. She states that prior to presentation, she has been having worsening SAUNDERS for 1.5 weeks. She denies accompanying chest pain, orthopnea, PND, and edema to BLE. She states compliance with her Cpap and medications. She states that she has had increased thirst and has been consuming increased fluids recently. She states that she dropped frozen chicken on her right foot and has had tenderness and mild swelling to that area \"ever since\". Please note that Ms. Fito Boudreaux is somewhat of a poor historian. She states that she had poor sleep last night, and falls asleep frequently midsentence during conversation.   Upon arrival to the ED, her VS were 97.2-108-28-83% on RA-168/89. O2 increased to 93% on 4 L by nasal cannula. EKG SR with RBBB. Covid testing negative. WBC 7.4.  H&H 11.2/36.9.  BUN/SCR 20/1. proBNP 1870. Troponin of 13. CXR with moderate pulmonary edema. Multifocal pneumonia possibly. Small left pleural effusion. Right foot x-ray was unremarkable. She did receive hydralazine 10 mg IV, DuoNeb, sublingual nitroglycerin x2, Bumex 1 mg IV. Pulmonology was consulted. Cardiology was consulted for management of CHF per Dr. Romain Parker. Please note: past medical records were reviewed per electronic medical record (EMR) - see detailed reports under Past Medical/ Surgical History. Past Medical and Surgical History:   1. Morbid obesity  2. Hypertension  3. Hyperlipidemia. Currently not on a statin but has been intolerant of Lipitor  4. Current tobacco abuse  5. Diabetes which is insulin requiring , diabetic neuropathy   6. Obstructive sleep apnea, states compliance with Cpap  7. Ischemic cardiomyopathy with a CABG in 2011 with a LIMA to the LAD and a saphenous vein graft to the marginal last cardiac catheterization was in April 2015 and the LIMA was unable to be visualized and the saphenous vein graft to the obtuse marginal was patent. He had no further stress testing since that time. 8. 2-D echocardiogram January 4, 2017, EF 35-45% and diffuse hypokinesis of the left ventricle and left atrium is dilatedAortic valve appears mildly sclerotic and there is mild mitral regurgitation  9. NSTEMI: 6/6/2018 (troponin 0.35, 0.32, 0.48) no rise in CK or CK-MB.  Creatinine 1.0.  Per cardiology, probable recent NSTEMI, type II low risk noninvasive 6/5/2018.  Patient was diuresed approximately 10 L --> And discharged home on loop diuretic.   10. Lexiscan MPS: 6/5/2018: Pharmacologically induced perfusion defect involving the lateral wall, without evidence of reversibility, diffusely hypokinetic left ventricular wall motion with a focal area of akinesis involving the mid lateral wall, LVEF 42%. 11. TTE: 6/5/2018: LVEF 65% by biplane, stage II diastolic dysfunction, moderate LVH, no wall motion abnormality, normal RV function, no pericardial effusion. 12. TTE 06/11/2021 (Dr. Trav Rosa): Left ventricle size is normal. Ejection fraction is visually estimated at 55-60%. No regional wall motion abnormalities seen. Severe concentric left ventricular hypertrophy. Indeterminate diastolic function. Normal right ventricular size. Right ventricle global systolic function is mildly reduced . TAPSE 15 mm. No hemodynamically significant aortic stenosis is present. Physiologic and/or trace tricuspid regurgitation. RVSP is 26 mmHg. Normal estimated PA systolic pressure. No evidence for hemodynamically significant pericardial effusion. 13. Chronic RBBB    14. IV dye, Lipitor, Lasix, Norvasc allergies: Unknown reaction per patient  15. C. diff 2015  16. Asthma status post bronchial brush biopsy in January 2013   17. Osteoarthritis. And left knee replacement  18. Hx Liver biopsy. Hepatic encephalopathy  19. S/p Bilateral breast reduction   20. Diverticulosis. 21. Hyperplastic colon polyps and fatty liver  22. Hx Ventral hernia  23. Glaucoma  24. Hx Anemia and blood transfusion  25. Hx Pneumonia  26. Depression  27. S/p Cholecystectomy   28. Patient follows with [facility]y at [facility] and was diagnosed with chronic pain syndrome and is recommending chronic pain management. 29. History of severe peripheral vascular disease with history of left popliteal and tibial trunk atherectomy on 11/13/2018 was initiated on aspirin and Plavix.          Medications Prior to admit:  Prior to Admission medications    Medication Sig Start Date End Date Taking?  Authorizing Provider   guaiFENesin 400 MG tablet Take 1 tablet by mouth 2 times daily as needed for Cough 7/14/21   Zula Clarity Freddy, DO   aspirin (ASPIRIN LOW DOSE) 81 MG chewable tablet TAKE 1 TABLET BY MOUTH EVERY DAY 6/25/21   Zula Clarity Freddy, DO   miconazole BY MOUTH EVERY DAY 4/13/21   [facility], DO   baclofen (LIORESAL) 10 MG tablet TAKE 1 TABLET BY MOUTH TWICE A DAY AS NEEDED  Patient taking differently: Take 10 mg by mouth 2 times daily as needed  4/6/21   [facility], DO   spironolactone (ALDACTONE) 25 MG tablet Take 1 tablet by mouth daily 3/16/21   Jovanna Stringer MD   docusate sodium (COLACE) 100 MG capsule TAKE 2 CAPSULES BY MOUTH AT BEDTIME 3/11/21   Historical Provider, MD   Alcohol Swabs ([facility] ALCOHOL PREP SWABS) 70 % PADS Use daily 12/29/20   [facility], DO   blood glucose test strips (ACCU-CHEK GUIDE) strip 1 each by In Vitro route 3 times daily 12/29/20   [facility], DO   Accu-Chek FastClix Lancets MISC Use 4x daily 12/28/20   [facility], DO   SPIRIVA RESPIMAT 1.25 MCG/ACT AERS inhaler INHALE TWO (2) PUFFS BY MOUTH EVERY DAY 11/24/20   [facility], DO   isosorbide mononitrate (IMDUR) 60 MG extended release tablet TAKE (1) TABLET BY MOUTH DAILY 11/18/20   Jovanna Stringer MD   bumetanide (BUMEX) 1 MG tablet TAKE (1) TABLET BY MOUTH DAILY 10/13/20   [facility], DO   traZODone (DESYREL) 100 MG tablet TAKE (1) TABLET BY MOUTH NIGHTLY 10/13/20   [facility] DO   simvastatin (ZOCOR) 20 MG tablet TAKE 1 TABLET BY MOUTH NIGHTLY 10/6/20   Jovanna Stringer MD   azelastine (ASTELIN) 0.1 % nasal spray 1 spray by Nasal route 2 times daily Use in each nostril as directed 9/16/20   Rangel Diaz, DO   Handicap Placard MISC by Does not apply route Patient cannot walk 200 ft without stopping to rest.    Expiration 8/2022 7/30/20   [facility], DO   Blood Glucose Monitoring Suppl (ACCU-CHEK GUIDE ME) w/Device KIT Use as directed 4/10/20   [facility], DO   BiPAP Machine MISC by Does not apply route nightly 27/23    Gaviota Sorenson MD       Current Medications:    Current Facility-Administered Medications: methylPREDNISolone sodium (SOLU-MEDROL) injection 40 mg, 40 mg, Intravenous, Q12H  ipratropium-albuterol (DUONEB) nebulizer solution 1 ampule, 1 ampule, Inhalation, Q4H WA  Arformoterol Tartrate (BROVANA) nebulizer solution 15 mcg, 15 mcg, Nebulization, BID  aspirin chewable tablet 81 mg, 81 mg, Oral, Daily  baclofen (LIORESAL) tablet 10 mg, 10 mg, Oral, BID PRN  bumetanide (BUMEX) tablet 1 mg, 1 mg, Oral, BID  carvedilol (COREG) tablet 25 mg, 25 mg, Oral, BID WC  docusate sodium (COLACE) capsule 100 mg, 100 mg, Oral, Nightly  DULoxetine (CYMBALTA) extended release capsule 60 mg, 60 mg, Oral, BID  gabapentin (NEURONTIN) capsule 100 mg, 100 mg, Oral, BID  guaiFENesin tablet 400 mg, 400 mg, Oral, BID PRN  hydrALAZINE (APRESOLINE) tablet 50 mg, 50 mg, Oral, 3 times per day  isosorbide mononitrate (IMDUR) extended release tablet 60 mg, 60 mg, Oral, Daily  metoclopramide (REGLAN) tablet 5 mg, 5 mg, Oral, TID  montelukast (SINGULAIR) tablet 10 mg, 10 mg, Oral, Nightly  sacubitril-valsartan (ENTRESTO) 49-51 MG per tablet 1 tablet, 1 tablet, Oral, BID  trospium (SANCTURA) tablet 20 mg, 20 mg, Oral, BID AC  spironolactone (ALDACTONE) tablet 25 mg, 25 mg, Oral, Daily  traZODone (DESYREL) tablet 100 mg, 100 mg, Oral, Nightly  Vitamin D (CHOLECALCIFEROL) tablet 1,000 Units, 1,000 Units, Oral, Daily  sodium chloride flush 0.9 % injection 10 mL, 10 mL, Intravenous, 2 times per day  sodium chloride flush 0.9 % injection 10 mL, 10 mL, Intravenous, PRN  0.9 % sodium chloride infusion, 25 mL, Intravenous, PRN  potassium chloride (KLOR-CON M) extended release tablet 40 mEq, 40 mEq, Oral, PRN **OR** potassium bicarb-citric acid (EFFER-K) effervescent tablet 40 mEq, 40 mEq, Oral, PRN **OR** potassium chloride 10 mEq/100 mL IVPB (Peripheral Line), 10 mEq, Intravenous, PRN  enoxaparin (LOVENOX) injection 40 mg, 40 mg, Subcutaneous, Daily  senna (SENOKOT) tablet 8.6 mg, 1 tablet, Oral, Daily PRN  acetaminophen (TYLENOL) tablet 650 mg, 650 mg, Oral, Q6H PRN **OR** acetaminophen (TYLENOL) suppository 650 mg, 650 mg, Rectal, Q6H PRN  insulin lispro (HUMALOG) injection vial 0-12 Units, 0-12 Units, Subcutaneous, TID WC  insulin lispro (HUMALOG) injection vial 0-6 Units, 0-6 Units, Subcutaneous, Nightly  glucose (GLUTOSE) 40 % oral gel 15 g, 15 g, Oral, PRN  dextrose 50 % IV solution, 12.5 g, Intravenous, PRN  glucagon (rDNA) injection 1 mg, 1 mg, Intramuscular, PRN  dextrose 5 % solution, 100 mL/hr, Intravenous, PRN  labetalol (NORMODYNE;TRANDATE) injection 5 mg, 5 mg, Intravenous, Q4H PRN  simvastatin (ZOCOR) tablet 20 mg, 20 mg, Oral, Nightly  insulin glargine (LANTUS) injection vial 40 Units, 40 Units, Subcutaneous, Nightly    Allergies:  Latex, Bee venom, Dilaudid [hydromorphone hcl], Dye [iodides], Percocet [oxycodone-acetaminophen], Keflex [cephalexin], Lasix [furosemide], Levaquin [levofloxacin in d5w], Lipitor, Lyrica [pregabalin], Morphine, Naproxen, Nefazodone, Norvasc [amlodipine], Oxycodone-acetaminophen, Shellfish-derived products, and Trazodone and nefazodone    Social History:    States that she smokes less than 1 pack of cigarettes per day for years. Denies alcohol and illicit drug use. Family History:   Please note this information was not obtained at this time, as it is limited in nature due to the patient's advanced age. REVIEW OF SYSTEMS:     · Constitutional: Denies fevers, chills, night sweats, and fatigue  · HEENT: Denies headaches, nose bleeds, and blurred vision,oral pain, abscess or lesion. · Musculoskeletal: Denies falls, pain to BLE with ambulation and edema to BLE. · Neurological: Denies dizziness and lightheadedness, numbness and tingling  · Cardiovascular: Denies chest pain, palpitations, and feelings of heart racing. · Respiratory: Denies orthopnea and PND. Complains of SAUNDERS-see HPI  · Gastrointestinal: Denies heartburn, nausea/vomiting, diarrhea and constipation, black/bloody, and tarry stools.    · Genitourinary: Denies dysuria and hematuria  · Hematologic: Denies excessive bruising or bleeding  · Lymphatic: Denies lumps and bumps to neck, axilla, breast, and groin  · Endocrine: Denies excessive thirst. Denies intolerance to hot and cold  · GYN: Postmenopausal state; Denies vaginal bleeding. · Psychiatric: Denies anxiety and depression. PHYSICAL EXAM:   BP (!) 164/82   Pulse 106   Temp 97.9 °F (36.6 °C) (Oral)   Resp 18   Ht 5' 1\" (1.549 m)   Wt 200 lb 8 oz (90.9 kg)   LMP 01/01/1990   SpO2 93%   BMI 37.88 kg/m²   CONST:  Well developed, morbidly obese, elderly female who appears stated age. Awake, alert, cooperative, no apparent distress  HEENT:   Head- Normocephalic, atraumatic   Eyes- Conjunctivae pink, anicteric  Throat- Oral mucosa pink and moist  Neck-  No stridor, trachea midline, + jugular venous distention. No adenopathy. Short, thick neck  CHEST: Chest symmetrical and non-tender to palpation. No accessory muscle use or intercostal retractions  RESPIRATORY: Lung sounds -diminished throughout anterior and lateral fields   CARDIOVASCULAR:     No carotid bruit  Heart Inspection- shows no noted pulsations  Heart Palpation- no heaves or thrills; PMI is non-displaced   Heart Ausculation- Regular rate and rhythm, no murmur. No s3, s4 or rub   PV: Trace bilateral lower extremity edema. No varicosities. Pedal pulses palpable, no clubbing or cyanosis   ABDOMEN: Soft, morbidly obese, non-tender to light palpation. Bowel sounds present. No palpable masses no organomegaly; no abdominal bruit  MS: Good muscle strength and tone. No atrophy or abnormal movements. : Deferred  SKIN: Warm and dry no statis dermatitis or ulcers   NEURO / PSYCH: Oriented to person, place and time. Speech clear and appropriate. Follows all commands.  Pleasant affect     DATA:    ECG: As above  Tele strips: SR with HR currently 96  Diagnostic:      Intake/Output Summary (Last 24 hours) at 7/16/2021 0837  Last data filed at 7/16/2021 0456  Gross per 24 hour   Intake 20 ml   Output 900 ml   Net -880 ml Labs:   CBC:   Recent Labs     07/15/21  1500   WBC 7.4   HGB 11.2*   HCT 36.9        BMP:   Recent Labs     07/15/21  1500 07/16/21  0255    142   K 4.5 4.0   CO2 27 32*   BUN 20 17   CREATININE 1.0 0.9   LABGLOM >60 >60   CALCIUM 8.6 9.0   HgA1c:   Lab Results   Component Value Date    LABA1C 6.8 (H) 06/11/2021   FASTING LIPID PANEL:  Lab Results   Component Value Date    CHOL 131 02/05/2021    HDL 27 02/05/2021    LDLCALC 60 02/05/2021    TRIG 219 02/05/2021     LIVER PROFILE:  Recent Labs     07/15/21  1500 07/16/21  0255   AST 15 9   ALT 10 11   LABALBU 3.2* 3.4*     Results for Doris Mendez (MRN [id number]) as of 7/16/2021 08:40   Ref. Range 7/15/2021 15:00 7/15/2021 16:45   Pro-BNP Latest Ref Range: 0 - 125 pg/mL 1,870 (H)    Troponin, High Sensitivity Latest Ref Range: 0 - 9 ng/L 13 (H) 13 (H)     07/15/2021 CXR:   Opacities may represent moderate pulmonary edema.  Multifocal pneumonia may also be considered in the proper clinical setting. Small left-sided pleural effusion. 07/15/2021 Right Foot XRay:   No acute osseous abnormality. Soft tissue swelling. IMPRESSION:   1. Acute on chronic HFpEF with improved EF from 25% in 2013 to 55-60% based on echo in 06/2021.  2. ACC/AHA stage C.,  NYHA functional class III  3. Acute respiratory failure multifactorial including acute CHF, pneumonia  4. Pneumonia  5. HTN: Controlled  6. HLD, on statin. Reported statin intolerance in the past, specifics unclear  7. Chronic tobacco abuse  8. Chronic pain syndrome  9. Morbid obesity  10. DAYAN with stated compliance with CPAP  11. DM with peripheral neuropathy  12. Chronic RBBB  13. Hypoalbuminemia      PLAN:  1. Change PO Bumex to IV, twice daily dosing  2. Monitor daily weight, intake and output, BMP  3. Check proBNP level every other day   4. CPAP to be used nightly, as at home  5. Pulmonology consult as per Primary Service  6.  Above as discussed with Dr. Preeti Arevalo    Electronically signed by PATRIZIA Zhu - CNP on 7/16/2021 at 8:37 AM

## 2021-07-16 NOTE — PROGRESS NOTES
Date Performed: 04/03/2017    HISTORY OF PRESENT ILLNESS:  A 40-year-old female accompanied by her 2-year-old son coming today for a physical.  She sees for OB/GYN care her OB/GYN and she is seeing them regularly.  She also needs paperwork to be signed.  Otherwise doing well, except has been having some sinus pressure over the left maxillary sinus.    REVIEW OF SYSTEMS:  As above.  CONSTITUTIONAL:  No weight changes.  No fever.  No weakness.  No fatigue.  No changes in appetite.  No night sweats.  HEENT:  No head trauma.  No pain.  No changes in vision.  No eye discharge, redness, or pain.  No nasal discharge.  No epistaxis.  No congestion.  No soreness in the mouth cavity.  No tongue soreness.  No dry mouth.  No sore throat.  No problems to speak or swallow.  CARDIOVASCULAR:  No chest pains.  No palpitations.  No shortness of breath.  No PND.  No orthopnea.  No peripheral edema.  No dizziness.  No fainting.  No claudication.  RESPIRATORY:  No shortness of breath.  No cough.  No phlegm production.  No hemoptysis.  No wheezing.  GASTROINTESTINAL:  No nausea, vomiting, diarrhea or constipation.  No hematochezia or melena.  No hematemesis.  No dysphagia.  GENITOURINARY:  No urgency or frequency on urination.  No pyuria.  No hematuria.  No incontinence.  MUSCULOSKELETAL:  No joint pains.  No swelling.  No stiffness in the joints.  No traumas to the joints.  No falls.  SKIN:  Normal integument.  No rashes.  No lesions.  No excessive skin dryness.  No color changes.  No new or changed moles.  No excessive hair loss.  No excessive hair growth.  No changes of the nails.  NEUROLOGIC:  No weakness or numbness of the extremities or any side of the body.  No changes in coordination.  No headaches.  No vertigo.  No seizures.  No memory loss.  No falls.  No problems with balance.  No muscular atrophy.  PSYCHIATRIC:  No mood changes.  No depression or anxiety.  No emotional lability. No suicidal ideations.  No hallucinations.  No  Physical Therapy    Physical Therapy Initial Assessment     Name: Codi Chong  : 1955  MRN: [id number]      Date of Service: 2021    Evaluating PT: Amy Reyes PT, DPT [id number]      Room #:  7428/4182-R  Diagnosis:  [facility]ute respiratory failure with [facility] ([facility] [J96.01]  PMHx/PSHx:  Diabetic neuropathy, chronic back pain, sleep apnea, C diff, HTN, DM, CAD, asthma, morbid obesity, OA, cellulitis, pulmonary edema, liver mass, ventral hernia, MI, glaucoma, COPD, hepatic encephalopathy, movement disorder, hiatal hernia, depression, CKD, CHF, PVD, cerebellar infarct  Precautions:  Fall risk, O2    SUBJECTIVE:    Pt lives alone in a single story house with 2 stair(s) to enter. Bed is on the first floor and bath is on the first floor. Pt ambulated with rollator walker prior to admission. Pt is on 3 L O2/min at home. OBJECTIVE:   Initial Evaluation  Date: 21 Treatment Date: Short Term/ Long Term   Goals   AM-PAC 6 Clicks      Was pt agreeable to Eval/treatment? Yes     Does pt have pain? Muscle spasm pain; RN aware     Bed Mobility  Rolling: NT  Supine to sit: Independent   Sit to supine: Independent   Scooting: Independent   NA   Transfers Sit to stand: Supervision  Stand to sit: Supervision  Stand pivot: SBA without AD  Sit to stand: Independent   Stand to sit: Independent   Stand pivot: Independent    Ambulation   25, 50 feet with WW with SBA  200 feet with [employer] Mod Independent    Stair negotiation: ascended and descended NT  2 step(s) with 1 rail(s) Mod Independent    ROM BUE: Refer to OT note  BLE: WFL     Strength BUE: Refer to OT Note  BLE: WFL     Balance Sitting EOB: Independent   Dynamic Standing: SBA with [employer]  Dynamic Standing: Mod Independent with [employer]     Pt is A & O x: 4 to person, place, month/year, and situation. Sensation: Pt denies numbness and tingling of extremities. Edema: NT    Patient education  Pt educated on PT role in acute care setting.     Patient response to problems with insomnia.  ENDOCRINE:  No heat or cold intolerance.  No excessive thirst.  No excessive urination.  No changes in appetite.  No changes in skin color or hair growth.  HEMATOPOIETIC AND LYMPHATIC:  No easy bruising.  No abnormal bleeding.  No excessive fatigue.  No dyspnea.  No tender nodules on palpation or any noticeable nodules.  No focal swelling noted.  ACCIDENT HISTORY:  There are no falls.  No trauma.  No violence.  No abuse.    PHYSICAL EXAMINATION:  HEENT:  Conjunctivae not injected.  PERRLA, EOMI.  No nystagmus.  Ears:  Normal tympanic membranes with no fluid, no erythema of the TMs.  No sinus point tenderness on percussion.  Nasal mucosa:  Normal.  Oral mucosa:  Pink with no lesions.  Tongue is midline.  Uvula is midline.  No exudates or erythema over the tonsils.  Very minimal left maxillary sinus tenderness on percussion.  NECK:  Supple with no JVD, lymphadenopathy or thyromegaly.  Normal pulses over carotids.  No masses.  CHEST:  Clear to auscultation bilaterally.  No wheezing, rhonchi, or crackles.  Heart rate is regular with normal S1, S2.  No gallop, murmur, or rub.  No thrills.  ABDOMEN:  Soft, benign.  No masses on palpation.  No hepatosplenomegaly.  No tenderness.  No guarding, rebound, or rigidity.  Bowel sounds present and normal in all 4 quadrants.  No abdominal bruits.  EXTREMITIES:  No clubbing, cyanosis, or edema.  Peripheral pulses palpable, 2+ and equal bilaterally.  SKIN:  Warm and dry.  NEUROLOGIC:  The patient is alert and oriented x3.  Cranial nerves 2-12 are normal.  Motor, cerebellar and sensory exam are grossly normal.    ASSESSMENT AND PLAN:  1.  For her physical, advised about all the blood tests which were normal.  She has Gilbert syndrome so the bilirubin was slightly elevated, HDL 53.  2.  For her sinusitis, try Claritin-D daily, Flonase 1 squirt to each nostril at night for a week.  If not better or if she starts to cough with yellow-green phlegm she should then  TIME, Standing Count: 1 Occurrences, R      Marvin Del Angel, DO        Diagnosis:  Acute respiratory failure with hypoxia ([facility] [J96.01]  Specific instructions for next treatment:  Increase ambulation distance. Current Treatment Recommendations:     [x] Strengthening to improve independence with functional mobility   [] ROM to improve independence with functional mobility   [x] Balance Training to improve static/dynamic balance and to reduce fall risk  [x] Endurance Training to improve activity tolerance during functional mobility   [x] Transfer Training to improve safety and independence with all functional transfers   [x] Gait Training to improve gait mechanics, endurance and assess need for appropriate assistive device  [x] Stair Training in preparation for safe discharge home and/or into the community   [x] Positioning to prevent skin breakdown and contractures  [x] Safety and Education Training   [] Patient/Caregiver Education   [] HEP  [] Other     PT long term treatment goals are located in above grid    Frequency of treatments: 2-5x/week x 2-3 days. Time in  1345  Time out  1405    Total Treatment Time  10 minutes     Evaluation Time includes thorough review of current medical information, gathering information on past medical history/social history and prior level of function, completion of standardized testing/informal observation of tasks, assessment of data and education on plan of care and goals.     CPT codes:  [x] Low Complexity PT evaluation 00242  [] Moderate Complexity PT evaluation 23383  [] High Complexity PT evaluation 22988  [] PT Re-evaluation 43944  [] Gait training 74597 0 minutes  [] Manual therapy 95763 0 minutes  [x] Therapeutic activities 34411 10 minutes  [] Therapeutic exercises 96176 0 minutes  [] Neuromuscular reeducation 78339 0 minutes     Jack Malik, PT, DPT  [id number] start Bactrim double strength 1 tablet twice daily, and she is going to take it for the next 10 days.  Prescription was given.      See me back yearly for OB/GYN care, follow up with OB/GYN at [place].      Dictated By: Nava Marquez MD  Signing Provider: MD ZULY Castaneda/zoltan ([id number])  DD: 04/03/2017 14:47:12 TD: 04/03/2017 14:59:14    Copy Sent To:

## 2021-07-16 NOTE — CONSULTS
[id number] [address]                                  CONSULTATION    PATIENT NAME: Pradeep Perez                  :        1955  MED REC NO:   [id number]                            ROOM:       0410  ACCOUNT NO:   [de-identified]                           ADMIT DATE: 07/15/2021  PROVIDER:     Klaus Lagunas MD    CONSULT DATE:  2021    Requested by Dr. Juanita Fitzpatrick REASON:  COPD with shortness of breath and hypoxia. IMPRESSION:  1. Acute hypoxic respiratory failure, back on O2 after recently being  discharged with COVID and then coming into the office getting it  discontinued after she had a satisfactory oximetry. 2.  COPD acute exacerbation. Her x-rays continue to be markedly  abnormal, but I suspect this is more COVID changes and she is actively  wheezing on exam, so I think this is more of an acute exacerbation of COPD. She also has obstructive sleep apnea and is lethargic, so we will put  her back on her BiPAP 27/23 now at bedtime and p.r.n. sleepiness or  napping. She continues to smoke four cigarettes a day despite multiple  efforts to get her to stop. HISTORY:  A 65-year-old  female with COPD/asthma and  sleep apnea presented with progressive shortness of breath over the last  several days. She gets short of breath walking from the bathroom to the  kitchen, has associated cough with yellow sputum. There were no fevers  or chills. She does have orthopnea. She is not wheezing. She has no  chest pain. Her nebulizer at home does not work, but breathing  treatments here were helpful. She has been faithfully wearing her BiPAP  27/23. I am not sure how much oxygen is being bled in if any and she  continues to smoke four cigarettes a day. She was found to be hypoxic  at her PCP's office and was sent to the emergency department with  subsequent admission.   Her x-ray as I mentioned, which I have reviewed,  was markedly abnormal with diffuse infiltrates, so there is some  question of congestive heart failure. PAST MEDICAL HISTORY:  Extensive and includes coronary disease status  post CABG, hypertension, hyperlipidemia, diabetes, cardiomyopathy,  spinal stenosis, diverticulosis, glaucoma, ventral hernia, and stress  incontinence. PAST SURGICAL HISTORY:  Includes breast reduction surgery; CABG, which  was done emergently at [facility] years ago; left knee replacement; and  hysterectomy. FAMILY HISTORY:  Several sisters had sarcoid. SOCIAL HISTORY:  Active smoker. 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 MEDICATIONS:  Aspirin 81 daily, baclofen 10 b.i.d. p.r.n., Bumex 1 mg  b.i.d., Coreg 25 b.i.d., docusate 100 nightly, Cymbalta 60 b.i.d.,  Lovenox 40 subcu daily, gabapentin 100 b.i.d., hydralazine 50 t.i.d.,  Lantus 40 daily. I have added DuoNeb and Solu-Medrol. She is on Imdur  60 daily, Reglan 5 mg t.i.d., Singulair 10 nightly, Effer-K 40 mEq  p.r.n., Entresto 49/51 one b.i.d., trazodone 100 nightly, Sanctura 20 mg  b.i.d. before meals, and vitamin D 1000 units daily. REVIEW OF SYSTEMS:  REVEALS ALLERGIES TO 17 DIFFERENT ITEMS, MEDICATION  WISE INCLUDES DYE, DILAUDID, PERCOCET, KEFLEX, LEVAQUIN, NEFAZODONE,  AMLODIPINE, OXYCODONE, ACETAMINOPHEN, SHELLFISH, WITH ADVERSE REACTIONS  TO LASIX, LIPITOR, LYRICA. She has had no fevers or chills. She has  decreased vision and hearing. No chest pain. Positive orthopnea,  shortness of breath, and cough. No nausea or vomiting. She has urinary  incontinence. She has no leg swelling. No arthralgias. No history of  blood clots. She is diabetic. Review of systems is otherwise negative. PHYSICAL EXAMINATION:  GENERAL:  She is sleepy, but arousable. She is in no acute distress. VITAL SIGNS:  Temperature 36.8, pulse 90, respiratory rate was 18, blood  pressure 173/100. SKIN:  No rashes.   HEENT:  Eyes had clear sclerae. Teeth and palate were normal.  NECK:  Without masses, adenopathy, or JVD. CHEST:  Symmetric. There was no accessory muscle use. HEART:  Regular in rate and rhythm. LUNGS:  Had a couple of scattered wheezes. No rales. No rhonchi. ABDOMEN:  Soft and nontender without masses or organomegaly. EXTREMITIES:  Showed no clubbing, cyanosis, or lymphedema. NEUROLOGIC:  She was sleepy, but appropriate. DATA:  Chest radiograph was reviewed and compared to previous studies  and showed diffuse infiltrates, unchanged from before. Serum glucose  this morning was 170. Chemistries from this morning:  Glucose was 197. Her bicarb was 32, up from 27 until the day before. CBC was  7.4/11.2/252 yesterday. Old records were reviewed on the computer. I  am going to get a blood gas now.         Long Neville MD    D: 07/16/2021 8:08:34       T: 07/16/2021 8:17:51     [email or web address]  Job#: [id number]     Doc#: [id number]    CC:

## 2021-07-16 NOTE — PROGRESS NOTES
7: 23 AM  Consult placed via [employer] text to Dr. Emilee Bravo.   Holly Moura, [facility]  7/16/2021    READ @ 7:23 AM

## 2021-07-16 NOTE — PROGRESS NOTES
Anna Giacomo was ordered simvastatin [facility]) which is a nonformulary medication. The patient has indicated that the home supply of this medication will be brought in to the hospital for inpatient use. If the medication has not been administered by 1400 on the following day from the time the order was placed, a pharmacist will follow-up with the nurse of the patient to assess the capability of the patient to bring in the medication. If it is determined that the patient cannot supply the medication and it is not available to be dispensed from the pharmacy, a call will be placed to the ordering provider to discuss alternative options.     Alvina Bella, [address] [place] [address]  7/15/2021  10:47 PM

## 2021-07-17 LAB
AADO2: 33.9 MMHG
ADENOVIRUS BY PCR: NOT DETECTED
ALBUMIN SERPL-MCNC: 3.7 G/DL (ref 3.5–5.2)
ALP BLD-CCNC: 77 U/L (ref 35–104)
ALT SERPL-CCNC: 9 U/L (ref 0–32)
ANION GAP SERPL CALCULATED.3IONS-SCNC: 8 MMOL/L (ref 7–16)
AST SERPL-CCNC: 6 U/L (ref 0–31)
B.E.: 11.6 MMOL/L (ref -3–3)
BASOPHILS ABSOLUTE: 0 E9/L (ref 0–0.2)
BASOPHILS RELATIVE PERCENT: 0 % (ref 0–2)
BILIRUB SERPL-MCNC: 0.3 MG/DL (ref 0–1.2)
BORDETELLA PARAPERTUSSIS BY PCR: NOT DETECTED
BORDETELLA PERTUSSIS BY PCR: NOT DETECTED
BUN BLDV-MCNC: 20 MG/DL (ref 6–23)
CALCIUM SERPL-MCNC: 9.5 MG/DL (ref 8.6–10.2)
CHLAMYDOPHILIA PNEUMONIAE BY PCR: NOT DETECTED
CHLORIDE BLD-SCNC: 97 MMOL/L (ref 98–107)
CO2: 36 MMOL/L (ref 22–29)
COHB: 2.1 % (ref 0–1.5)
COMMENT: ABNORMAL
CORONAVIRUS 229E BY PCR: NOT DETECTED
CORONAVIRUS HKU1 BY PCR: NOT DETECTED
CORONAVIRUS NL63 BY PCR: NOT DETECTED
CORONAVIRUS OC43 BY PCR: NOT DETECTED
CREAT SERPL-MCNC: 1.1 MG/DL (ref 0.5–1)
CRITICAL: ABNORMAL
DATE ANALYZED: ABNORMAL
DATE OF COLLECTION: ABNORMAL
EOSINOPHILS ABSOLUTE: 0 E9/L (ref 0.05–0.5)
EOSINOPHILS RELATIVE PERCENT: 0 % (ref 0–6)
FIO2: 30 %
GFR AFRICAN AMERICAN: >60
GFR NON-AFRICAN AMERICAN: >60 ML/MIN/1.73
GLUCOSE BLD-MCNC: 190 MG/DL (ref 74–99)
HCO3: 39.7 MMOL/L (ref 22–26)
HCT VFR BLD CALC: 39.6 % (ref 34–48)
HEMOGLOBIN: 11.6 G/DL (ref 11.5–15.5)
HHB: 3.8 % (ref 0–5)
HUMAN METAPNEUMOVIRUS BY PCR: NOT DETECTED
HUMAN RHINOVIRUS/ENTEROVIRUS BY PCR: NOT DETECTED
IMMATURE GRANULOCYTES #: 0.03 E9/L
IMMATURE GRANULOCYTES %: 0.4 % (ref 0–5)
INFLUENZA A BY PCR: NOT DETECTED
INFLUENZA B BY PCR: NOT DETECTED
LAB: ABNORMAL
LYMPHOCYTES ABSOLUTE: 0.45 E9/L (ref 1.5–4)
LYMPHOCYTES RELATIVE PERCENT: 6.7 % (ref 20–42)
Lab: ABNORMAL
MCH RBC QN AUTO: 29.1 PG (ref 26–35)
MCHC RBC AUTO-ENTMCNC: 29.3 % (ref 32–34.5)
MCV RBC AUTO: 99.5 FL (ref 80–99.9)
METER GLUCOSE: 184 MG/DL (ref 74–99)
METER GLUCOSE: 216 MG/DL (ref 74–99)
METER GLUCOSE: 233 MG/DL (ref 74–99)
METER GLUCOSE: 243 MG/DL (ref 74–99)
METHB: 0.3 % (ref 0–1.5)
MODE: ABNORMAL
MONOCYTES ABSOLUTE: 0.18 E9/L (ref 0.1–0.95)
MONOCYTES RELATIVE PERCENT: 2.7 % (ref 2–12)
MYCOPLASMA PNEUMONIAE BY PCR: NOT DETECTED
NEUTROPHILS ABSOLUTE: 6.1 E9/L (ref 1.8–7.3)
NEUTROPHILS RELATIVE PERCENT: 90.2 % (ref 43–80)
O2 CONTENT: 15.9 ML/DL
O2 SATURATION: 96.1 % (ref 92–98.5)
O2HB: 93.8 % (ref 94–97)
OPERATOR ID: 913
PARAINFLUENZA VIRUS 1 BY PCR: NOT DETECTED
PARAINFLUENZA VIRUS 2 BY PCR: NOT DETECTED
PARAINFLUENZA VIRUS 3 BY PCR: NOT DETECTED
PARAINFLUENZA VIRUS 4 BY PCR: NOT DETECTED
PATIENT TEMP: 37 C
PCO2: 72.1 MMHG (ref 35–45)
PDW BLD-RTO: 14.2 FL (ref 11.5–15)
PFO2: 2.93 MMHG/%
PH BLOOD GAS: 7.36 (ref 7.35–7.45)
PLATELET # BLD: 296 E9/L (ref 130–450)
PMV BLD AUTO: 10.7 FL (ref 7–12)
PO2: 87.8 MMHG (ref 75–100)
POTASSIUM REFLEX MAGNESIUM: 4.3 MMOL/L (ref 3.5–5)
PRO-BNP: 1412 PG/ML (ref 0–125)
RBC # BLD: 3.98 E12/L (ref 3.5–5.5)
RESPIRATORY SYNCYTIAL VIRUS BY PCR: NOT DETECTED
RI(T): 39 %
SARS-COV-2, PCR: NOT DETECTED
SODIUM BLD-SCNC: 141 MMOL/L (ref 132–146)
SOURCE, BLOOD GAS: ABNORMAL
THB: 12 G/DL (ref 11.5–16.5)
TIME ANALYZED: 1349
TOTAL PROTEIN: 7.2 G/DL (ref 6.4–8.3)
WBC # BLD: 6.8 E9/L (ref 4.5–11.5)

## 2021-07-17 PROCEDURE — 0202U NFCT DS 22 TRGT SARS-COV-2: CPT

## 2021-07-17 PROCEDURE — 99233 SBSQ HOSP IP/OBS HIGH 50: CPT | Performed by: INTERNAL MEDICINE

## 2021-07-17 PROCEDURE — 85025 COMPLETE CBC W/AUTO DIFF WBC: CPT

## 2021-07-17 PROCEDURE — 82805 BLOOD GASES W/O2 SATURATION: CPT

## 2021-07-17 PROCEDURE — 6360000002 HC RX W HCPCS: Performed by: INTERNAL MEDICINE

## 2021-07-17 PROCEDURE — 2060000000 HC ICU INTERMEDIATE R&B

## 2021-07-17 PROCEDURE — 2500000003 HC RX 250 WO HCPCS: Performed by: INTERNAL MEDICINE

## 2021-07-17 PROCEDURE — 2700000000 HC OXYGEN THERAPY PER DAY

## 2021-07-17 PROCEDURE — 2580000003 HC RX 258: Performed by: INTERNAL MEDICINE

## 2021-07-17 PROCEDURE — 36415 COLL VENOUS BLD VENIPUNCTURE: CPT

## 2021-07-17 PROCEDURE — 83880 ASSAY OF NATRIURETIC PEPTIDE: CPT

## 2021-07-17 PROCEDURE — 6370000000 HC RX 637 (ALT 250 FOR IP): Performed by: INTERNAL MEDICINE

## 2021-07-17 PROCEDURE — 82962 GLUCOSE BLOOD TEST: CPT

## 2021-07-17 PROCEDURE — 2500000003 HC RX 250 WO HCPCS: Performed by: NURSE PRACTITIONER

## 2021-07-17 PROCEDURE — 94640 AIRWAY INHALATION TREATMENT: CPT

## 2021-07-17 PROCEDURE — 80053 COMPREHEN METABOLIC PANEL: CPT

## 2021-07-17 PROCEDURE — 94660 CPAP INITIATION&MGMT: CPT

## 2021-07-17 RX ADMIN — MONTELUKAST SODIUM 10 MG: 10 TABLET, FILM COATED ORAL at 21:13

## 2021-07-17 RX ADMIN — INSULIN LISPRO 2 UNITS: 100 INJECTION, SOLUTION INTRAVENOUS; SUBCUTANEOUS at 21:37

## 2021-07-17 RX ADMIN — ARFORMOTEROL TARTRATE 15 MCG: 15 SOLUTION RESPIRATORY (INHALATION) at 21:01

## 2021-07-17 RX ADMIN — DULOXETINE HYDROCHLORIDE 60 MG: 60 CAPSULE, DELAYED RELEASE ORAL at 08:57

## 2021-07-17 RX ADMIN — TROSPIUM CHLORIDE 20 MG: 20 TABLET, FILM COATED ORAL at 16:45

## 2021-07-17 RX ADMIN — MICONAZOLE NITRATE: 20 POWDER TOPICAL at 15:15

## 2021-07-17 RX ADMIN — ARFORMOTEROL TARTRATE 15 MCG: 15 SOLUTION RESPIRATORY (INHALATION) at 08:10

## 2021-07-17 RX ADMIN — METOCLOPRAMIDE 5 MG: 5 TABLET ORAL at 06:24

## 2021-07-17 RX ADMIN — DULOXETINE HYDROCHLORIDE 60 MG: 60 CAPSULE, DELAYED RELEASE ORAL at 21:13

## 2021-07-17 RX ADMIN — Medication 1000 UNITS: at 08:57

## 2021-07-17 RX ADMIN — TRAMADOL HYDROCHLORIDE 50 MG: 50 TABLET, FILM COATED ORAL at 15:15

## 2021-07-17 RX ADMIN — Medication 10 ML: at 08:57

## 2021-07-17 RX ADMIN — INSULIN LISPRO 4 UNITS: 100 INJECTION, SOLUTION INTRAVENOUS; SUBCUTANEOUS at 12:30

## 2021-07-17 RX ADMIN — HYDRALAZINE HYDROCHLORIDE 50 MG: 50 TABLET, FILM COATED ORAL at 06:25

## 2021-07-17 RX ADMIN — BUMETANIDE 1 MG: 0.25 INJECTION, SOLUTION INTRAMUSCULAR; INTRAVENOUS at 08:57

## 2021-07-17 RX ADMIN — GUAIFENESIN 400 MG: 400 TABLET ORAL at 15:09

## 2021-07-17 RX ADMIN — CARVEDILOL 25 MG: 25 TABLET, FILM COATED ORAL at 08:57

## 2021-07-17 RX ADMIN — SPIRONOLACTONE 25 MG: 25 TABLET ORAL at 08:57

## 2021-07-17 RX ADMIN — IPRATROPIUM BROMIDE AND ALBUTEROL SULFATE 1 AMPULE: 2.5; .5 SOLUTION RESPIRATORY (INHALATION) at 08:10

## 2021-07-17 RX ADMIN — ENOXAPARIN SODIUM 40 MG: 40 INJECTION SUBCUTANEOUS at 08:56

## 2021-07-17 RX ADMIN — TROSPIUM CHLORIDE 20 MG: 20 TABLET, FILM COATED ORAL at 06:25

## 2021-07-17 RX ADMIN — METHYLPREDNISOLONE SODIUM SUCCINATE 40 MG: 40 INJECTION, POWDER, LYOPHILIZED, FOR SOLUTION INTRAMUSCULAR; INTRAVENOUS at 21:13

## 2021-07-17 RX ADMIN — IPRATROPIUM BROMIDE AND ALBUTEROL SULFATE 1 AMPULE: 2.5; .5 SOLUTION RESPIRATORY (INHALATION) at 17:32

## 2021-07-17 RX ADMIN — IPRATROPIUM BROMIDE AND ALBUTEROL SULFATE 1 AMPULE: 2.5; .5 SOLUTION RESPIRATORY (INHALATION) at 13:40

## 2021-07-17 RX ADMIN — GABAPENTIN 100 MG: 100 CAPSULE ORAL at 08:57

## 2021-07-17 RX ADMIN — SACUBITRIL AND VALSARTAN 1 TABLET: 49; 51 TABLET, FILM COATED ORAL at 08:57

## 2021-07-17 RX ADMIN — BUMETANIDE 1 MG: 0.25 INJECTION, SOLUTION INTRAMUSCULAR; INTRAVENOUS at 21:13

## 2021-07-17 RX ADMIN — ASPIRIN 81 MG: 81 TABLET, CHEWABLE ORAL at 08:57

## 2021-07-17 RX ADMIN — Medication 10 ML: at 21:13

## 2021-07-17 RX ADMIN — GABAPENTIN 100 MG: 100 CAPSULE ORAL at 21:13

## 2021-07-17 RX ADMIN — INSULIN LISPRO 2 UNITS: 100 INJECTION, SOLUTION INTRAVENOUS; SUBCUTANEOUS at 06:25

## 2021-07-17 RX ADMIN — IPRATROPIUM BROMIDE AND ALBUTEROL SULFATE 1 AMPULE: 2.5; .5 SOLUTION RESPIRATORY (INHALATION) at 21:01

## 2021-07-17 RX ADMIN — HYDRALAZINE HYDROCHLORIDE 50 MG: 50 TABLET, FILM COATED ORAL at 15:09

## 2021-07-17 RX ADMIN — METHYLPREDNISOLONE SODIUM SUCCINATE 40 MG: 40 INJECTION, POWDER, LYOPHILIZED, FOR SOLUTION INTRAMUSCULAR; INTRAVENOUS at 08:57

## 2021-07-17 RX ADMIN — INSULIN LISPRO 4 UNITS: 100 INJECTION, SOLUTION INTRAVENOUS; SUBCUTANEOUS at 16:50

## 2021-07-17 RX ADMIN — INSULIN GLARGINE 40 UNITS: 100 INJECTION, SOLUTION SUBCUTANEOUS at 21:16

## 2021-07-17 RX ADMIN — TRAZODONE HYDROCHLORIDE 100 MG: 50 TABLET ORAL at 21:15

## 2021-07-17 RX ADMIN — METOCLOPRAMIDE 5 MG: 5 TABLET ORAL at 16:45

## 2021-07-17 RX ADMIN — CARVEDILOL 25 MG: 25 TABLET, FILM COATED ORAL at 16:55

## 2021-07-17 RX ADMIN — ISOSORBIDE MONONITRATE 60 MG: 60 TABLET, EXTENDED RELEASE ORAL at 08:57

## 2021-07-17 RX ADMIN — DOCUSATE SODIUM 100 MG: 100 CAPSULE, LIQUID FILLED ORAL at 21:13

## 2021-07-17 ASSESSMENT — PAIN SCALES - GENERAL
PAINLEVEL_OUTOF10: 0
PAINLEVEL_OUTOF10: 0
PAINLEVEL_OUTOF10: 10
PAINLEVEL_OUTOF10: 0
PAINLEVEL_OUTOF10: 0

## 2021-07-17 NOTE — PROGRESS NOTES
Subjective:  68-year-old woman seen on the floor earlier this morning  Admission details noted  Patient was lying in the bed flat with BiPAP on  Seems comfortable  Tolerating medications  Data reviewed in detail with her    Objective:    /84   Pulse 70   Temp 97.9 °F (36.6 °C) (Oral)   Resp 20   Ht 5' 1\" (1.549 m)   Wt 223 lb 6.4 oz (101.3 kg)   LMP 01/01/1990   SpO2 96%   BMI 42.21 kg/m²   Flat in the bed with BiPAP on  No acute distress noted  Oral mucosa moist  Neck supple  Morbidly obese  Heart:  RRR, no murmurs, gallops, or rubs.   Lungs: Markedly diminished breath sounds noted  Abd: bowel sounds present, nontender, nondistended, no masses  Extrem:  No clubbing, cyanosis, or edema  Data reviewed    Assessment:  Acute respiratory failure  Patient Active Problem List   Diagnosis    Morbid obesity due to excess calories (HCC)    Hyperlipidemia    DAYAN and COPD overlap syndrome (HCC)    Vitamin D insufficiency    Chronic combined systolic and diastolic heart failure (HCC)    GERD (gastroesophageal reflux disease)    Major depressive disorder, recurrent episode, mild ([address]    Diabetic polyneuropathy associated with type 2 diabetes mellitus (HCC)    Chronic passive hepatic congestion    Mixed incontinence urge and stress    Chronic back pain - d/t muscle spasm    Glaucoma, open angle    Elevated CA 19-9 level    Lumbar stenosis    Spondylosis 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    Lumbar disc herniation    Asymmetric septal hypertrophy (HCC)    Non-compliance    Hiatal hernia    Melanosis coli    Coronary artery disease involving native coronary artery of native heart without angina pectoris    DM2 (diabetes mellitus, type 2) ([address]    Cigarette smoker    Marijuana use, smoked    Ischemic cardiomyopathy    PVD (peripheral vascular disease) with claudication ([address]    Chronic venous insufficiency    History of non-ST elevation myocardial infarction (NSTEMI)    QT

## 2021-07-17 NOTE — PROGRESS NOTES
Date: 7/17/2021    Time: 5:42 PM    Patient Placed On BIPAP/CPAP/ Non-Invasive Ventilation? Yes    If no must comment. Facial area red/color change? No           If YES are Blister/Lesion present? No   If yes must notify nursing staff  BIPAP/CPAP skin barrier?   Yes    Skin barrier type:mepilexlite       Comments: patient placed on AVAPS        Puja Pulido RCP

## 2021-07-17 NOTE — PROGRESS NOTES
[facility] Cardiology Inpatient Progress Note    Patient is a 76 y.o. female of [facility], DO seen in hospital follow up. Chief complaint: CHF    HPI: Some SOB. No CP. Past Medical and Surgical History: Morbid obesity  Hypertension  Hyperlipidemia. Currently not on a statin but has been intolerant of Lipitor  Current tobacco abuse  Diabetes which is insulin requiring , diabetic neuropathy   Obstructive sleep apnea, states compliance with Cpap  Ischemic cardiomyopathy with a CABG in 2011 with a LIMA to the LAD and a saphenous vein graft to the marginal last cardiac catheterization was in April 2015 and the LIMA was unable to be visualized and the saphenous vein graft to the obtuse marginal was patent. He had no further stress testing since that time. 2-D echocardiogram January 4, 2017, EF 35-45% and diffuse hypokinesis of the left ventricle and left atrium is dilatedAortic valve appears mildly sclerotic and there is mild mitral regurgitation  NSTEMI: 6/6/2018 (troponin 0.35, 0.32, 0.48) no rise in CK or CK-MB. Creatinine 1.0. Per cardiology, probable recent NSTEMI, type II low risk noninvasive 6/5/2018. Patient was diuresed approximately 10 L --> And discharged home on loop diuretic. Lexiscan MPS: 6/5/2018: Pharmacologically induced perfusion defect involving the lateral wall, without evidence of reversibility, diffusely hypokinetic left ventricular wall motion with a focal area of akinesis involving the mid lateral wall, LVEF 42%. TTE: 6/5/2018: LVEF 65% by biplane, stage II diastolic dysfunction, moderate LVH, no wall motion abnormality, normal RV function, no pericardial effusion. TTE 06/11/2021 (Dr. Katie Sharp): Left ventricle size is normal. Ejection fraction is visually estimated at 55-60%. No regional wall motion abnormalities seen. Severe concentric left ventricular hypertrophy. Indeterminate diastolic function. Normal right ventricular size.  Right ventricle global systolic function is mildly reduced . TAPSE 15 mm. No hemodynamically significant aortic stenosis is present. Physiologic and/or trace tricuspid regurgitation. RVSP is 26 mmHg. Normal estimated PA systolic pressure. No evidence for hemodynamically significant pericardial effusion. Chronic RBBB    IV dye, Lipitor, Lasix, Norvasc allergies: Unknown reaction per patient  C. diff 2015  Asthma status post bronchial brush biopsy in January 2013   Osteoarthritis. And left knee replacement  Hx Liver biopsy. Hepatic encephalopathy  S/p Bilateral breast reduction   Diverticulosis. Hyperplastic colon polyps and fatty liver  Hx Ventral hernia  Glaucoma  Hx Anemia and blood transfusion  Hx Pneumonia  Depression  S/p Cholecystectomy   Patient follows with neurology at [facility] and was diagnosed with chronic pain syndrome and is recommending chronic pain management. History of severe peripheral vascular disease with history of left popliteal and tibial trunk atherectomy on 11/13/2018 was initiated on aspirin and Plavix.      Patient Active Problem List   Diagnosis    Morbid obesity due to excess calories (HCC)    Hyperlipidemia    DAYAN and COPD overlap syndrome (HCC)    Vitamin D insufficiency    Chronic combined systolic and diastolic heart failure (HCC)    GERD (gastroesophageal reflux disease)    Major depressive disorder, recurrent episode, mild (HCC)    Diabetic polyneuropathy associated with type 2 diabetes mellitus (HCC)    Chronic passive hepatic congestion    Mixed incontinence urge and stress    Chronic back pain - d/t muscle spasm    Glaucoma, open angle    Elevated CA 19-9 level    Lumbar stenosis    Spondylosis 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    Lumbar disc herniation    Asymmetric septal hypertrophy (HCC)    Non-compliance    Hiatal hernia    Melanosis coli    Coronary artery disease involving native coronary artery of native heart without angina pectoris    DM2 (diabetes mellitus, type 2) ([facility]    Cigarette smoker Marijuana use, smoked    Ischemic cardiomyopathy    PVD (peripheral vascular disease) with claudication ([facility])    Chronic venous insufficiency    History of non-ST elevation myocardial infarction (NSTEMI)    QT prolongation    History of stroke    Chronic respiratory failure with hypoxia and hypercapnia ([facility])    Acute respiratory failure with hypoxia ([facility])    COPD (chronic obstructive pulmonary disease) ([facility])    Status post peripheral artery angioplasty    Uncontrolled hypertension    Acute on chronic congestive heart failure ([facility])       Allergies   Allergen Reactions    Latex Hives    Bee Venom Anaphylaxis    Dilaudid [Hydromorphone Hcl] Itching    Dye [Iodides] Hives and Shortness Of Breath    Percocet [Oxycodone-Acetaminophen] Shortness Of Breath and Itching    Keflex [Cephalexin] Itching and Rash    Lasix [Furosemide] Other (See Comments)     Pt states she cramps up and gets headaches    Levaquin [Levofloxacin In D5w] Hives    Lipitor      MUSCLE SPASMS    Lyrica [Pregabalin]      Dream disturbances    Morphine Hives    Naproxen      Unsure of reaction;pt states able to take Aleve without difficulties    Nefazodone Other (See Comments)    Norvasc [Amlodipine] Swelling    Oxycodone-Acetaminophen Swelling    Shellfish-Derived Products     Trazodone And Nefazodone        Current Facility-Administered Medications   Medication Dose Route Frequency Provider Last Rate Last Admin    methylPREDNISolone sodium (SOLU-MEDROL) injection 40 mg  40 mg Intravenous Q12H Lexa Ross MD   40 mg at 07/17/21 0857    ipratropium-albuterol (DUONEB) nebulizer solution 1 ampule  1 ampule Inhalation Q4H WA Lexa Ross MD   1 ampule at 07/17/21 0810    Arformoterol Tartrate (BROVANA) nebulizer solution 15 mcg  15 mcg Nebulization BID Lexa Ross MD   15 mcg at 07/17/21 0810    bumetanide (BUMEX) injection 1 mg  1 mg Intravenous BID PATRIZIA Squires - CNP   1 mg at 07/17/21 0857    traMADol (ULTRAM) tablet 50 mg 50 mg Oral Q6H PRN Marvin Del Angel, DO   50 mg at 07/16/21 1635    aspirin chewable tablet 81 mg  81 mg Oral Daily Marvin Del Angel, DO   81 mg at 07/17/21 0857    baclofen (LIORESAL) tablet 10 mg  10 mg Oral BID PRN Marvin Del Angel, DO   10 mg at 07/16/21 0833    carvedilol (COREG) tablet 25 mg  25 mg Oral BID WC Marvin Del Angel, DO   25 mg at 07/17/21 0857    docusate sodium (COLACE) capsule 100 mg  100 mg Oral Nightly Marvin Del Angel, DO   100 mg at 07/16/21 2053    DULoxetine (CYMBALTA) extended release capsule 60 mg  60 mg Oral BID Marvin Del Angel, DO   60 mg at 07/17/21 0857    gabapentin (NEURONTIN) capsule 100 mg  100 mg Oral BID Marvin Del Angel, DO   100 mg at 07/17/21 0857    guaiFENesin tablet 400 mg  400 mg Oral BID PRN Marvin Del Angel DO        hydrALAZINE (APRESOLINE) tablet 50 mg  50 mg Oral 3 times per day Marvin Del Angel, DO   50 mg at 07/17/21 7786    isosorbide mononitrate (IMDUR) extended release tablet 60 mg  60 mg Oral Daily Marvin Del Angel, DO   60 mg at 07/17/21 0857    metoclopramide (REGLAN) tablet 5 mg  5 mg Oral TID Marvin Del Angel, DO   5 mg at 07/17/21 0624    montelukast (SINGULAIR) tablet 10 mg  10 mg Oral Nightly Marvin Del Angel, DO   10 mg at 07/16/21 2052    sacubitril-valsartan (ENTRESTO) 49-51 MG per tablet 1 tablet  1 tablet Oral BID Marvin Del Angel, DO   1 tablet at 07/17/21 0857    trospium (SANCTURA) tablet 20 mg  20 mg Oral BID AC Marvin Del Angel, DO   20 mg at 07/17/21 3043    spironolactone (ALDACTONE) tablet 25 mg  25 mg Oral Daily Marvin Del Angel, DO   25 mg at 07/17/21 0857    traZODone (DESYREL) tablet 100 mg  100 mg Oral Nightly Marvin Del Angel, DO   100 mg at 07/16/21 2052    Vitamin D (CHOLECALCIFEROL) tablet 1,000 Units  1,000 Units Oral Daily Marvin Del Angel DO   1,000 Units at 07/17/21 0857    sodium chloride flush 0.9 % injection 10 mL  10 mL Intravenous 2 times per day Marvin Del Angel DO   10 mL at 07/17/21 0857    sodium chloride flush 0.9 % injection 10 mL  10 mL Intravenous PRN Marvin E Volino, DO        0.9 % sodium chloride infusion  25 mL Intravenous PRN Marvin CRUZ Volino, DO        potassium chloride (KLOR-CON M) extended release tablet 40 mEq  40 mEq Oral PRN Marvin CRUZ Volino, DO        Or    potassium bicarb-citric acid (EFFER-K) effervescent tablet 40 mEq  40 mEq Oral PRN Marvin E Volino, DO        Or    potassium chloride 10 mEq/100 mL IVPB (Peripheral Line)  10 mEq Intravenous PRN Marvin CRUZ Volino, DO        enoxaparin (LOVENOX) injection 40 mg  40 mg Subcutaneous Daily Marvin E Volino, DO   40 mg at 07/17/21 0856    senna (SENOKOT) tablet 8.6 mg  1 tablet Oral Daily PRN Marvin Nelsonino, DO        acetaminophen (TYLENOL) tablet 650 mg  650 mg Oral Q6H PRN Marvin Nelsonino, DO        Or    acetaminophen (TYLENOL) suppository 650 mg  650 mg Rectal Q6H PRN Marvin Nelsonino, DO        insulin lispro (HUMALOG) injection vial 0-12 Units  0-12 Units Subcutaneous TID WC Marvin Nelsonino, DO   2 Units at 07/17/21 5438    insulin lispro (HUMALOG) injection vial 0-6 Units  0-6 Units Subcutaneous Nightly Marvin CRUZ Volino, DO   3 Units at 07/16/21 2100    glucose (GLUTOSE) 40 % oral gel 15 g  15 g Oral PRN Marvin Nelsonino, DO        dextrose 50 % IV solution  12.5 g Intravenous PRN Marvin Nelsonino, DO        glucagon (rDNA) injection 1 mg  1 mg Intramuscular PRN Marvin Del Angel, DO        dextrose 5 % solution  100 mL/hr Intravenous PRN Marvin Nelsonino, DO        labetalol (NORMODYNE;TRANDATE) injection 5 mg  5 mg Intravenous Q4H PRN Marvin Nelsonino, DO        simvastatin (ZOCOR) tablet 20 mg  20 mg Oral Nightly Marvin CRUZ Volino, DO        insulin glargine (LANTUS) injection vial 40 Units  40 Units Subcutaneous Nightly Marvin CRUZ Volino, DO   40 Units at 07/16/21 2056       Review of systems:   Heart: as above   Lungs: as above   Eyes: denies changes in vision or discharge. Ears: denies changes in hearing or pain. Nose: denies epistaxis or masses   Throat: denies sore throat or trouble swallowing.    Neuro: denies obstructive pulmonary disease) (HCC)    Status post peripheral artery angioplasty    Uncontrolled hypertension    Acute on chronic congestive heart failure ([facility]     1. Acute on chronic HFpEF:    Improved EF from 25% in 2013 to 55-60% based on echo in 06/2021. ACC/AHA stage C. NYHA functional class III. IV Bumex. -1.5 liters. Coreg/entresto/hydralazine/imdur/aldactone. 2. Acute respiratory failure/Pneumonia: Per pulm. 3. HTN: Controlled    4. Lipids: Statin. 5. DM: Per PCP. 6. DAYAN: CPAP. 7. Chronic tobacco abuse    8. Morbid obesity    Soledad Pena D.O.   Cardiologist  Cardiology, [facility] [facility]

## 2021-07-17 NOTE — PROGRESS NOTES
Date: 7/17/2021    Time: 9:58 AM    Patient Placed On BIPAP/CPAP/ Non-Invasive Ventilation? Yes    If no must comment. Facial area red/color change? No           If YES are Blister/Lesion present? No   If yes must notify nursing staff  BIPAP/CPAP skin barrier?   Yes    Skin barrier type:mepilexlite       Comments:        Vicky Adams RCP

## 2021-07-17 NOTE — PROGRESS NOTES
Pulmonary  Progress Note    Admit Date: 7/15/2021                            PCP: Jeana Vargas DO  Patient Active Problem List   Diagnosis    Morbid obesity due to excess calories ([facility] [address] [address]    Hyperlipidemia    DAYAN and COPD overlap syndrome (HCC)    Vitamin D insufficiency    Chronic combined systolic and diastolic heart failure (HCC)    GERD (gastroesophageal reflux disease)    Major depressive disorder, recurrent episode, mild ([address]    Diabetic polyneuropathy associated with type 2 diabetes mellitus (HCC)    Chronic passive hepatic congestion    Mixed incontinence urge and stress    Chronic back pain - d/t muscle spasm    Glaucoma, open angle    Elevated CA 19-9 level    Lumbar stenosis    Spondylosis 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    Lumbar disc herniation    Asymmetric septal hypertrophy (HCC)    Non-compliance    Hiatal hernia    Melanosis coli    Coronary artery disease involving native coronary artery of native heart without angina pectoris    DM2 (diabetes mellitus, type 2) ([facility] [address]    Cigarette smoker    Marijuana use, smoked    Ischemic cardiomyopathy    PVD (peripheral vascular disease) with claudication ([facility] [address] [facility]    Chronic venous insufficiency    History of non-ST elevation myocardial infarction (NSTEMI)    QT prolongation    History of stroke    Chronic respiratory failure with hypoxia and hypercapnia (HCC)    Acute respiratory failure with hypoxia (HCC)    COPD (chronic obstructive pulmonary disease) (HCC)    Status post peripheral artery angioplasty    Uncontrolled hypertension    Acute on chronic congestive heart failure (HCC)       Subjective:  Sitting up in chair but falling asleep during exam  On 3L nc with pox 97%  Did not sleep much last night; wore Bipap - is on triliogy at home  Complains of dyspnea; cough with yellow/gray sputum      Medications:   sodium chloride      dextrose          methylPREDNISolone  40 mg Intravenous Q12H    ipratropium-albuterol  1 ampule Inhalation Q4H WA    Arformoterol Tartrate  15 mcg Nebulization BID    bumetanide  1 mg Intravenous BID    aspirin  81 mg Oral Daily    carvedilol  25 mg Oral BID     docusate sodium  100 mg Oral Nightly    DULoxetine  60 mg Oral BID    gabapentin  100 mg Oral BID    hydrALAZINE  50 mg Oral 3 times per day    isosorbide mononitrate  60 mg Oral Daily    metoclopramide  5 mg Oral TID    montelukast  10 mg Oral Nightly    sacubitril-valsartan  1 tablet Oral BID    trospium  20 mg Oral BID AC    spironolactone  25 mg Oral Daily    traZODone  100 mg Oral Nightly    Vitamin D  1,000 Units Oral Daily    sodium chloride flush  10 mL Intravenous 2 times per day    enoxaparin  40 mg Subcutaneous Daily    insulin lispro  0-12 Units Subcutaneous TID     insulin lispro  0-6 Units Subcutaneous Nightly    simvastatin  20 mg Oral Nightly    insulin glargine  40 Units Subcutaneous Nightly       Vitals:  Tmax:  VITALS:  BP (!) 172/65   Pulse 92   Temp 98.8 °F (37.1 °C) (Oral)   Resp 20   Ht 5' 1\" (1.549 m)   Wt 223 lb 6.4 oz (101.3 kg)   LMP 1990   SpO2 95%   BMI 42.21 kg/m²   24HR INTAKE/OUTPUT:      Intake/Output Summary (Last 24 hours) at 2021 0912  Last data filed at 2021  Gross per 24 hour   Intake 280 ml   Output 900 ml   Net -620 ml     CURRENT PULSE OXIMETRY:  SpO2: 95 %  24HR PULSE OXIMETRY RANGE:  SpO2  Av.8 %  Min: 94 %  Max: 96 %  CVP:    VENT SETTINGS:   Vent Information  Skin Assessment: Clean, dry, & intact  FiO2 : 30 %  SpO2: 95 %  Mask Type: Full face mask  Mask Size: Medium  Additional Respiratory  Assessments  Pulse: 92  Resp: 20  SpO2: 95 %      EXAM:  General: No distress. Falling asleep during exam.  Eyes: PERRL. No sclera icterus. No conjunctival injection. ENT: No discharge. Pharynx clear. Neck: Trachea midline. Normal thyroid. Resp: No accessory muscle use. Diminished with exp wheezing and rhonchi scattered.  No crackles. No wheezing. CV: Regular rate. Regular rhythm. No mumur or rub. Trace BLE edema. ABD: Non-tender. Non-distended. No masses. No organmegaly. Normal bowel sounds. Skin: Warm and dry. No nodule on exposed extremities. No rash on exposed extremities. Lymph: No cervical LAD. No supraclavicular LAD. M/S: No cyanosis. No joint deformity. No clubbing. Neuro: Very sleepy. Follows commands. I/O: I/O last 3 completed shifts: In: 26 [P.O.:240; I.V.:40]  Out: 900 [Urine:900]  No intake/output data recorded. Results:  CBC:   Recent Labs     07/15/21  1500 07/16/21  1640 07/17/21  0720   WBC 7.4 6.8 6.8   HGB 11.2* 11.8 11.6   HCT 36.9 39.8 39.6   MCV 97.9 100.5* 99.5    288 296     BMP:   Recent Labs     07/15/21  1500 07/16/21  0255 07/17/21  0720    142 141   K 4.5 4.0  --     102 97*   CO2 27 32* 36*   BUN 20 17 20   CREATININE 1.0 0.9 1.1*       Cultures:  -    ABG:   Results for Huber Canchola (MRN [id number]) as of 7/17/2021 09:12   Ref. Range 7/16/2021 09:34   Source: Unknown Blood Arterial   pH, Blood Gas Latest Ref Range: 7.350 - 7.450  7.370   PCO2 Latest Ref Range: 35.0 - 45.0 mmHg 66.0 (H)   pO2 Latest Ref Range: 75.0 - 100.0 mmHg 72.8 (L)   HCO3 Latest Ref Range: 22.0 - 26.0 mmol/L 37.3 (H)       Films:  7/15/21 CXR : Impression Opacities may represent moderate pulmonary edema. Multifocal pneumonia may also be considered in the proper clinical setting. Small left-sided pleural effusion. Assessment:  · Acute hypoxemic respiratory failure  · Acute exacerbation of COPD  · Previous COVID infection   · DAYAN   · Current smoker  · CHF - cardiomyopathy  · CAD s/p CABG  · HTN  · DM      Plan:  · Continue 3L nc, wean as able to keep pox >90%. Baseline room air - was on previous O2 at home secondary to [place]. Will need ambulatory pulse ox prior to d/c if still on O2. · Continue Bipap - will change to on 4 hrs off 4 hrs with continued lethargy and elevated CO2 of 66.

## 2021-07-18 LAB
AADO2: 63.8 MMHG
ALBUMIN SERPL-MCNC: 3.3 G/DL (ref 3.5–5.2)
ALP BLD-CCNC: 75 U/L (ref 35–104)
ALT SERPL-CCNC: 9 U/L (ref 0–32)
ANION GAP SERPL CALCULATED.3IONS-SCNC: 8 MMOL/L (ref 7–16)
AST SERPL-CCNC: 6 U/L (ref 0–31)
B.E.: 11.8 MMOL/L (ref -3–3)
BASOPHILS ABSOLUTE: 0 E9/L (ref 0–0.2)
BASOPHILS RELATIVE PERCENT: 0 % (ref 0–2)
BILIRUB SERPL-MCNC: 0.3 MG/DL (ref 0–1.2)
BUN BLDV-MCNC: 25 MG/DL (ref 6–23)
CALCIUM SERPL-MCNC: 9.1 MG/DL (ref 8.6–10.2)
CHLORIDE BLD-SCNC: 95 MMOL/L (ref 98–107)
CO2: 36 MMOL/L (ref 22–29)
COHB: 1.8 % (ref 0–1.5)
CREAT SERPL-MCNC: 1.1 MG/DL (ref 0.5–1)
CRITICAL: ABNORMAL
DATE ANALYZED: ABNORMAL
DATE OF COLLECTION: ABNORMAL
EOSINOPHILS ABSOLUTE: 0 E9/L (ref 0.05–0.5)
EOSINOPHILS RELATIVE PERCENT: 0 % (ref 0–6)
FIO2: 30 %
GFR AFRICAN AMERICAN: >60
GFR NON-AFRICAN AMERICAN: >60 ML/MIN/1.73
GLUCOSE BLD-MCNC: 262 MG/DL (ref 74–99)
HCO3: 37.8 MMOL/L (ref 22–26)
HCT VFR BLD CALC: 37 % (ref 34–48)
HEMOGLOBIN: 11 G/DL (ref 11.5–15.5)
HHB: 4.7 % (ref 0–5)
IMMATURE GRANULOCYTES #: 0.01 E9/L
IMMATURE GRANULOCYTES %: 0.1 % (ref 0–5)
LAB: ABNORMAL
LYMPHOCYTES ABSOLUTE: 0.59 E9/L (ref 1.5–4)
LYMPHOCYTES RELATIVE PERCENT: 8 % (ref 20–42)
Lab: ABNORMAL
MCH RBC QN AUTO: 28.9 PG (ref 26–35)
MCHC RBC AUTO-ENTMCNC: 29.7 % (ref 32–34.5)
MCV RBC AUTO: 97.4 FL (ref 80–99.9)
METER GLUCOSE: 266 MG/DL (ref 74–99)
METER GLUCOSE: 286 MG/DL (ref 74–99)
METER GLUCOSE: 389 MG/DL (ref 74–99)
METER GLUCOSE: 433 MG/DL (ref 74–99)
METHB: 0.3 % (ref 0–1.5)
MODE: ABNORMAL
MONOCYTES ABSOLUTE: 0.27 E9/L (ref 0.1–0.95)
MONOCYTES RELATIVE PERCENT: 3.7 % (ref 2–12)
NEUTROPHILS ABSOLUTE: 6.49 E9/L (ref 1.8–7.3)
NEUTROPHILS RELATIVE PERCENT: 88.2 % (ref 43–80)
O2 CONTENT: 15.8 ML/DL
O2 SATURATION: 95.2 % (ref 92–98.5)
O2HB: 93.2 % (ref 94–97)
OPERATOR ID: ABNORMAL
PATIENT TEMP: 37 C
PCO2: 55.9 MMHG (ref 35–45)
PDW BLD-RTO: 14.1 FL (ref 11.5–15)
PEEP/CPAP: 5 CMH2O
PFO2: 2.56 MMHG/%
PH BLOOD GAS: 7.45 (ref 7.35–7.45)
PLATELET # BLD: 267 E9/L (ref 130–450)
PMV BLD AUTO: 10.8 FL (ref 7–12)
PO2: 76.9 MMHG (ref 75–100)
POTASSIUM REFLEX MAGNESIUM: 4.7 MMOL/L (ref 3.5–5)
RBC # BLD: 3.8 E12/L (ref 3.5–5.5)
RBC # BLD: NORMAL 10*6/UL
RI(T): 83 %
RR MECHANICAL: 16 B/MIN
SODIUM BLD-SCNC: 139 MMOL/L (ref 132–146)
SOURCE, BLOOD GAS: ABNORMAL
THB: 12 G/DL (ref 11.5–16.5)
TIME ANALYZED: 654
TOTAL PROTEIN: 6.7 G/DL (ref 6.4–8.3)
VT MECHANICAL: 500 ML
WBC # BLD: 7.4 E9/L (ref 4.5–11.5)

## 2021-07-18 PROCEDURE — 94660 CPAP INITIATION&MGMT: CPT

## 2021-07-18 PROCEDURE — 36415 COLL VENOUS BLD VENIPUNCTURE: CPT

## 2021-07-18 PROCEDURE — 6360000002 HC RX W HCPCS: Performed by: INTERNAL MEDICINE

## 2021-07-18 PROCEDURE — 85025 COMPLETE CBC W/AUTO DIFF WBC: CPT

## 2021-07-18 PROCEDURE — 6370000000 HC RX 637 (ALT 250 FOR IP): Performed by: INTERNAL MEDICINE

## 2021-07-18 PROCEDURE — 6370000000 HC RX 637 (ALT 250 FOR IP): Performed by: NURSE PRACTITIONER

## 2021-07-18 PROCEDURE — 2500000003 HC RX 250 WO HCPCS: Performed by: NURSE PRACTITIONER

## 2021-07-18 PROCEDURE — 80053 COMPREHEN METABOLIC PANEL: CPT

## 2021-07-18 PROCEDURE — 87206 SMEAR FLUORESCENT/ACID STAI: CPT

## 2021-07-18 PROCEDURE — 2580000003 HC RX 258: Performed by: INTERNAL MEDICINE

## 2021-07-18 PROCEDURE — 2700000000 HC OXYGEN THERAPY PER DAY

## 2021-07-18 PROCEDURE — 82962 GLUCOSE BLOOD TEST: CPT

## 2021-07-18 PROCEDURE — 99233 SBSQ HOSP IP/OBS HIGH 50: CPT | Performed by: INTERNAL MEDICINE

## 2021-07-18 PROCEDURE — 94640 AIRWAY INHALATION TREATMENT: CPT

## 2021-07-18 PROCEDURE — 82805 BLOOD GASES W/O2 SATURATION: CPT

## 2021-07-18 PROCEDURE — 87070 CULTURE OTHR SPECIMN AEROBIC: CPT

## 2021-07-18 PROCEDURE — 2060000000 HC ICU INTERMEDIATE R&B

## 2021-07-18 RX ORDER — PLECANATIDE 3 MG/1
TABLET ORAL
COMMUNITY
Start: 2021-06-24 | End: 2021-09-15 | Stop reason: ALTCHOICE

## 2021-07-18 RX ORDER — ACETAMINOPHEN 325 MG/1
TABLET ORAL
COMMUNITY
Start: 2021-05-04 | End: 2021-07-30

## 2021-07-18 RX ORDER — PREDNISONE 20 MG/1
40 TABLET ORAL
Status: DISCONTINUED | OUTPATIENT
Start: 2021-07-18 | End: 2021-07-21 | Stop reason: HOSPADM

## 2021-07-18 RX ORDER — PANTOPRAZOLE SODIUM 40 MG/1
40 TABLET, DELAYED RELEASE ORAL DAILY
COMMUNITY
Start: 2021-06-21 | End: 2022-09-22

## 2021-07-18 RX ORDER — IBUPROFEN 600 MG/1
TABLET ORAL
Status: ON HOLD | COMMUNITY
Start: 2021-06-25 | End: 2021-07-21 | Stop reason: HOSPADM

## 2021-07-18 RX ADMIN — Medication 10 ML: at 08:28

## 2021-07-18 RX ADMIN — CARVEDILOL 25 MG: 25 TABLET, FILM COATED ORAL at 16:25

## 2021-07-18 RX ADMIN — TROSPIUM CHLORIDE 20 MG: 20 TABLET, FILM COATED ORAL at 05:41

## 2021-07-18 RX ADMIN — SACUBITRIL AND VALSARTAN 1 TABLET: 49; 51 TABLET, FILM COATED ORAL at 08:29

## 2021-07-18 RX ADMIN — IPRATROPIUM BROMIDE AND ALBUTEROL SULFATE 1 AMPULE: 2.5; .5 SOLUTION RESPIRATORY (INHALATION) at 09:02

## 2021-07-18 RX ADMIN — IPRATROPIUM BROMIDE AND ALBUTEROL SULFATE 1 AMPULE: 2.5; .5 SOLUTION RESPIRATORY (INHALATION) at 12:59

## 2021-07-18 RX ADMIN — SACUBITRIL AND VALSARTAN 1 TABLET: 49; 51 TABLET, FILM COATED ORAL at 21:07

## 2021-07-18 RX ADMIN — METOCLOPRAMIDE 5 MG: 5 TABLET ORAL at 05:41

## 2021-07-18 RX ADMIN — METOCLOPRAMIDE 5 MG: 5 TABLET ORAL at 16:25

## 2021-07-18 RX ADMIN — MICONAZOLE NITRATE: 20 POWDER TOPICAL at 21:08

## 2021-07-18 RX ADMIN — BUMETANIDE 1 MG: 0.25 INJECTION, SOLUTION INTRAMUSCULAR; INTRAVENOUS at 20:55

## 2021-07-18 RX ADMIN — SPIRONOLACTONE 25 MG: 25 TABLET ORAL at 08:28

## 2021-07-18 RX ADMIN — ENOXAPARIN SODIUM 40 MG: 40 INJECTION SUBCUTANEOUS at 08:27

## 2021-07-18 RX ADMIN — INSULIN LISPRO 6 UNITS: 100 INJECTION, SOLUTION INTRAVENOUS; SUBCUTANEOUS at 11:40

## 2021-07-18 RX ADMIN — SACUBITRIL AND VALSARTAN 1 TABLET: 49; 51 TABLET, FILM COATED ORAL at 20:56

## 2021-07-18 RX ADMIN — GABAPENTIN 100 MG: 100 CAPSULE ORAL at 20:57

## 2021-07-18 RX ADMIN — INSULIN LISPRO 6 UNITS: 100 INJECTION, SOLUTION INTRAVENOUS; SUBCUTANEOUS at 06:33

## 2021-07-18 RX ADMIN — INSULIN GLARGINE 40 UNITS: 100 INJECTION, SOLUTION SUBCUTANEOUS at 20:58

## 2021-07-18 RX ADMIN — METHYLPREDNISOLONE SODIUM SUCCINATE 40 MG: 40 INJECTION, POWDER, LYOPHILIZED, FOR SOLUTION INTRAMUSCULAR; INTRAVENOUS at 08:28

## 2021-07-18 RX ADMIN — GUAIFENESIN 400 MG: 400 TABLET ORAL at 21:03

## 2021-07-18 RX ADMIN — BUMETANIDE 1 MG: 0.25 INJECTION, SOLUTION INTRAMUSCULAR; INTRAVENOUS at 08:28

## 2021-07-18 RX ADMIN — CARVEDILOL 25 MG: 25 TABLET, FILM COATED ORAL at 08:28

## 2021-07-18 RX ADMIN — HYDRALAZINE HYDROCHLORIDE 50 MG: 50 TABLET, FILM COATED ORAL at 14:28

## 2021-07-18 RX ADMIN — DOCUSATE SODIUM 100 MG: 100 CAPSULE, LIQUID FILLED ORAL at 20:57

## 2021-07-18 RX ADMIN — ARFORMOTEROL TARTRATE 15 MCG: 15 SOLUTION RESPIRATORY (INHALATION) at 20:47

## 2021-07-18 RX ADMIN — DULOXETINE HYDROCHLORIDE 60 MG: 60 CAPSULE, DELAYED RELEASE ORAL at 08:28

## 2021-07-18 RX ADMIN — METOCLOPRAMIDE 5 MG: 5 TABLET ORAL at 11:40

## 2021-07-18 RX ADMIN — TRAZODONE HYDROCHLORIDE 100 MG: 50 TABLET ORAL at 20:55

## 2021-07-18 RX ADMIN — GUAIFENESIN 400 MG: 400 TABLET ORAL at 08:26

## 2021-07-18 RX ADMIN — GABAPENTIN 100 MG: 100 CAPSULE ORAL at 08:28

## 2021-07-18 RX ADMIN — ARFORMOTEROL TARTRATE 15 MCG: 15 SOLUTION RESPIRATORY (INHALATION) at 09:02

## 2021-07-18 RX ADMIN — Medication 1000 UNITS: at 08:28

## 2021-07-18 RX ADMIN — MONTELUKAST SODIUM 10 MG: 10 TABLET, FILM COATED ORAL at 20:56

## 2021-07-18 RX ADMIN — INSULIN LISPRO 10 UNITS: 100 INJECTION, SOLUTION INTRAVENOUS; SUBCUTANEOUS at 16:25

## 2021-07-18 RX ADMIN — Medication 10 ML: at 21:07

## 2021-07-18 RX ADMIN — HYDRALAZINE HYDROCHLORIDE 50 MG: 50 TABLET, FILM COATED ORAL at 20:57

## 2021-07-18 RX ADMIN — ISOSORBIDE MONONITRATE 60 MG: 60 TABLET, EXTENDED RELEASE ORAL at 08:28

## 2021-07-18 RX ADMIN — TROSPIUM CHLORIDE 20 MG: 20 TABLET, FILM COATED ORAL at 16:25

## 2021-07-18 RX ADMIN — IPRATROPIUM BROMIDE AND ALBUTEROL SULFATE 1 AMPULE: 2.5; .5 SOLUTION RESPIRATORY (INHALATION) at 16:59

## 2021-07-18 RX ADMIN — INSULIN LISPRO 6 UNITS: 100 INJECTION, SOLUTION INTRAVENOUS; SUBCUTANEOUS at 20:57

## 2021-07-18 RX ADMIN — PREDNISONE 40 MG: 20 TABLET ORAL at 10:18

## 2021-07-18 RX ADMIN — MICONAZOLE NITRATE: 20 POWDER TOPICAL at 08:29

## 2021-07-18 RX ADMIN — HYDRALAZINE HYDROCHLORIDE 50 MG: 50 TABLET, FILM COATED ORAL at 05:41

## 2021-07-18 RX ADMIN — DULOXETINE HYDROCHLORIDE 60 MG: 60 CAPSULE, DELAYED RELEASE ORAL at 20:56

## 2021-07-18 RX ADMIN — IPRATROPIUM BROMIDE AND ALBUTEROL SULFATE 1 AMPULE: 2.5; .5 SOLUTION RESPIRATORY (INHALATION) at 20:47

## 2021-07-18 RX ADMIN — ASPIRIN 81 MG: 81 TABLET, CHEWABLE ORAL at 08:28

## 2021-07-18 RX ADMIN — TRAMADOL HYDROCHLORIDE 50 MG: 50 TABLET, FILM COATED ORAL at 00:03

## 2021-07-18 ASSESSMENT — PAIN SCALES - GENERAL
PAINLEVEL_OUTOF10: 8
PAINLEVEL_OUTOF10: 0
PAINLEVEL_OUTOF10: 3
PAINLEVEL_OUTOF10: 0

## 2021-07-18 NOTE — PROGRESS NOTES
[facility] Cardiology Inpatient Progress Note    [facility] is a 76 [facility]  seen in hospital follow up. Chief complaint: CHF    HPI: Some SOB. No CP. Past Medical and Surgical History:   1. Morbid obesity  2. Hypertension  3. Hyperlipidemia. Currently not on a statin but has been intolerant of Lipitor  4. Current tobacco abuse  5. Diabetes which is insulin requiring , diabetic neuropathy   6. Obstructive sleep apnea, states compliance with Cpap  7. Ischemic cardiomyopathy with a CABG in 2011 with a LIMA to the LAD and a saphenous vein graft to the marginal last cardiac catheterization was in April 2015 and the LIMA was unable to be visualized and the saphenous vein graft to the obtuse marginal was patent. He had no further stress testing since that time. 8. 2-D echocardiogram January 4, 2017, EF 35-45% and diffuse hypokinesis of the left ventricle and left atrium is dilatedAortic valve appears mildly sclerotic and there is mild mitral regurgitation  9. NSTEMI: 6/6/2018 (troponin 0.35, 0.32, 0.48) no rise in CK or CK-MB. Creatinine 1.0. Per cardiology, probable recent NSTEMI, type II low risk noninvasive 6/5/2018. Patient was diuresed approximately 10 L --> And discharged home on loop diuretic. 10. Lexiscan MPS: 6/5/2018: Pharmacologically induced perfusion defect involving the lateral wall, without evidence of reversibility, diffusely hypokinetic left ventricular wall motion with a focal area of akinesis involving the mid lateral wall, LVEF 42%. 11. TTE: 6/5/2018: LVEF 65% by biplane, stage II diastolic dysfunction, moderate LVH, no wall motion abnormality, normal RV function, no pericardial effusion. 12. TTE 06/11/2021 (Dr. Tashi Multani): Left ventricle size is normal. Ejection fraction is visually estimated at 55-60%. No regional wall motion abnormalities seen. Severe concentric left ventricular hypertrophy. Indeterminate diastolic function. Normal right ventricular size.  Right ventricle global systolic function is mildly reduced . TAPSE 15 mm. No hemodynamically significant aortic stenosis is present. Physiologic and/or trace tricuspid regurgitation. RVSP is 26 mmHg. Normal estimated PA systolic pressure. No evidence for hemodynamically significant pericardial effusion. 13. Chronic RBBB    14. IV dye, Lipitor, Lasix, Norvasc allergies: Unknown reaction per patient  15. C. diff 2015  16. Asthma status post bronchial brush biopsy in January 2013   17. Osteoarthritis. And left knee replacement  18. Hx Liver biopsy. Hepatic encephalopathy  19. S/p Bilateral breast reduction   20. Diverticulosis. 21. Hyperplastic colon polyps and fatty liver  22. Hx Ventral hernia  23. Glaucoma  24. Hx Anemia and blood transfusion  25. Hx Pneumonia  26. Depression  27. S/p Cholecystectomy   28. Patient follows with [facility]y at [facility] and was diagnosed with chronic pain syndrome and is recommending chronic pain management. 29. History of severe peripheral vascular disease with history of left popliteal and tibial trunk atherectomy on 11/13/2018 was initiated on aspirin and Plavix.      Patient Active Problem List   Diagnosis    Morbid obesity due to excess calories (HCC)    Hyperlipidemia    DAYAN and COPD overlap syndrome (HCC)    Vitamin D insufficiency    Chronic combined systolic and diastolic heart failure (HCC)    GERD (gastroesophageal reflux disease)    Major depressive disorder, recurrent episode, mild ([address]    Diabetic polyneuropathy associated with type 2 diabetes mellitus (HCC)    Chronic passive hepatic congestion    Mixed incontinence urge and stress    Chronic back pain - d/t muscle spasm    Glaucoma, open angle    Elevated CA 19-9 level    Lumbar stenosis    Spondylosis 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    Lumbar disc herniation    Asymmetric septal hypertrophy (HCC)    Non-compliance    Hiatal hernia    Melanosis coli    Coronary artery disease involving native coronary artery of native heart without angina pectoris    DM2 (diabetes mellitus, type 2) ([facility] [address]    Cigarette smoker    Marijuana use, smoked    Ischemic cardiomyopathy    PVD (peripheral vascular disease) with claudication ([facility]    Chronic venous insufficiency    History of non-ST elevation myocardial infarction (NSTEMI)    QT prolongation    History of stroke    Chronic respiratory failure with hypoxia and hypercapnia (HCC)    Acute respiratory failure with hypoxia (HCC)    COPD (chronic obstructive pulmonary disease) ([facility]    Status post peripheral artery angioplasty    Uncontrolled hypertension    Acute on chronic congestive heart failure (HCC)       Allergies   Allergen Reactions    Latex Hives    Bee Venom Anaphylaxis    Dilaudid [Hydromorphone Hcl] Itching    Dye [Iodides] Hives and Shortness Of Breath    Percocet [Oxycodone-Acetaminophen] Shortness Of Breath and Itching    Keflex [Cephalexin] Itching and Rash    Lasix [Furosemide] Other (See Comments)     Pt states she cramps up and gets headaches    Levaquin [Levofloxacin In D5w] Hives    Lipitor      MUSCLE SPASMS    Lyrica [Pregabalin]      Dream disturbances    Morphine Hives    Naproxen      Unsure of reaction;pt states able to take Aleve without difficulties    Nefazodone Other (See Comments)    Norvasc [Amlodipine] Swelling    Oxycodone-Acetaminophen Swelling    Shellfish-Derived Products     Trazodone And Nefazodone        Current Facility-Administered Medications   Medication Dose Route Frequency Provider Last Rate Last Admin    predniSONE (DELTASONE) tablet 40 mg  40 mg Oral Daily with breakfast [facility], APRN - CNP        miconazole (MICOTIN) 2 % powder   Topical BID Lieutenant Emely MD   Given at 07/18/21 0829    ipratropium-albuterol (DUONEB) nebulizer solution 1 ampule  1 ampule Inhalation Q4H WA Josh Albright MD   1 ampule at 07/18/21 0902    Arformoterol Tartrate (BROVANA) nebulizer 1,000 Units  1,000 Units Oral Daily Marvin E Volino, DO   1,000 Units at 07/18/21 1130    sodium chloride flush 0.9 % injection 10 mL  10 mL Intravenous 2 times per day Marvin E Volino, DO   10 mL at 07/18/21 3368    sodium chloride flush 0.9 % injection 10 mL  10 mL Intravenous PRN Marvin CRUZ Volino, DO        0.9 % sodium chloride infusion  25 mL Intravenous PRN Marvin E Volino, DO        potassium chloride (KLOR-CON M) extended release tablet 40 mEq  40 mEq Oral PRN Marvin E Volino, DO        Or    potassium bicarb-citric acid (EFFER-K) effervescent tablet 40 mEq  40 mEq Oral PRN Marvin E Volino, DO        Or    potassium chloride 10 mEq/100 mL IVPB (Peripheral Line)  10 mEq Intravenous PRN Marvin E Volino, DO        enoxaparin (LOVENOX) injection 40 mg  40 mg Subcutaneous Daily Marvin E Omarino, DO   40 mg at 07/18/21 0827    senna (SENOKOT) tablet 8.6 mg  1 tablet Oral Daily PRN Marvin Nelsonino, DO        acetaminophen (TYLENOL) tablet 650 mg  650 mg Oral Q6H PRN Marvin Del Angel, DO        Or    acetaminophen (TYLENOL) suppository 650 mg  650 mg Rectal Q6H PRN Marvin CRUZ Volino, DO        insulin lispro (HUMALOG) injection vial 0-12 Units  0-12 Units Subcutaneous TID WC Marvin E Volino, DO   6 Units at 07/18/21 6689    insulin lispro (HUMALOG) injection vial 0-6 Units  0-6 Units Subcutaneous Nightly Marvin CRUZ Volino, DO   2 Units at 07/17/21 2137    glucose (GLUTOSE) 40 % oral gel 15 g  15 g Oral PRN Marvin Nelsonino, DO        dextrose 50 % IV solution  12.5 g Intravenous PRN Marvin Nelsonino, DO        glucagon (rDNA) injection 1 mg  1 mg Intramuscular PRN Marvin Nelsonino, DO        dextrose 5 % solution  100 mL/hr Intravenous PRN Marvin Nelsonino, DO        labetalol (NORMODYNE;TRANDATE) injection 5 mg  5 mg Intravenous Q4H PRN Marvin Nelsonino, DO        simvastatin (ZOCOR) tablet 20 mg  20 mg Oral Nightly Marvin Nelsonino, DO        insulin glargine (LANTUS) injection vial 40 Units  40 Units Subcutaneous Nightly Tara Canchola, DO   40 Units at 07/17/21 2116       Review of systems:   Heart: as above   Lungs: as above   Eyes: denies changes in vision or discharge. Ears: denies changes in hearing or pain. Nose: denies epistaxis or masses   Throat: denies sore throat or trouble swallowing. Neuro: denies numbness, tingling, tremors. Skin: denies rashes or itching. : denies hematuria, dysuria   GI: denies vomiting, diarrhea   Psych: denies mood changed, anxiety, depression. Physical Exam   BP (!) 147/70   Pulse 73   Temp 98.4 °F (36.9 °C) (Oral)   Resp 18   Ht 5' 1\" (1.549 m)   Wt 217 lb (98.4 kg)   LMP 01/01/1990   SpO2 96%   BMI 41.00 kg/m²   Constitutional: Oriented to person, place, and time. No distress. Well developed. Head: Normocephalic and atraumatic. Neck: Neck supple. No hepatojugular reflux. No JVD present. Carotid bruit is not present. No tracheal deviation present. No thyromegaly present. Cardiovascular: Normal rate, regular rhythm, normal heart sounds. intact distal pulses. No gallop and no friction rub. No murmur heard. Pulmonary: Breath sounds normal. No respiratory distress. No wheezes. No rales. Chest: Effort normal. No tenderness. Abdominal: Soft. Bowel sounds are normal. No distension or mass. No tenderness, rebound or guarding. Musculoskeletal: . No tenderness. No clubbing or cyanosis. Extremitites: Intact distal pulses. No edema  Neurological: Alert and oriented to person, place, and time. Skin: Skin is warm and dry. No rash noted. Not diaphoretic. No erythema. Psychiatric: Normal mood and affect. Behavior is normal.   Lymphadenopathy: No cervical adenopathy. No groin adenopathy.     CBC:   Lab Results   Component Value Date    WBC 7.4 07/18/2021    RBC 3.80 07/18/2021    HGB 11.0 07/18/2021    HCT 37.0 07/18/2021    MCV 97.4 07/18/2021    MCH 28.9 07/18/2021    MCHC 29.7 07/18/2021    RDW 14.1 07/18/2021     07/18/2021    MPV 10.8 07/18/2021     BMP: Lab Results   Component Value Date     07/18/2021    K 4.7 07/18/2021    CL 95 07/18/2021    CO2 36 07/18/2021    BUN 25 07/18/2021    LABALBU 3.3 07/18/2021    LABALBU 5.2 12/01/2011    CREATININE 1.1 07/18/2021    CALCIUM 9.1 07/18/2021    GFRAA >60 07/18/2021    LABGLOM >60 07/18/2021     Magnesium:    Lab Results   Component Value Date    MG 2.0 04/13/2019     Cardiac Enzymes:   Lab Results   Component Value Date    CKTOTAL 88 01/05/2016    CKTOTAL 85 11/12/2015    CKTOTAL 124 04/18/2015    CKMB 1.2 01/05/2016    CKMB 2.8 11/12/2015    CKMB 1.6 04/18/2015    TROPHS 13 (H) 07/15/2021    TROPHS 13 (H) 07/15/2021    TROPHS 11 (H) 06/11/2021      PT/INR:    Lab Results   Component Value Date    PROTIME 12.6 04/19/2021    PROTIME 12.6 04/22/2011    INR 1.1 04/19/2021     TSH:    Lab Results   Component Value Date    TSH 0.646 02/05/2021     Rhythm Strip: normal sinus rhythm.     ASSESSMENT & PLAN:    Patient Active Problem List   Diagnosis    Morbid obesity due to excess calories (HCC)    Hyperlipidemia    DAYAN and COPD overlap syndrome (HCC)    Vitamin D insufficiency    Chronic combined systolic and diastolic heart failure (HCC)    GERD (gastroesophageal reflux disease)    Major depressive disorder, recurrent episode, mild ([address]    Diabetic polyneuropathy associated with type 2 diabetes mellitus (HCC)    Chronic passive hepatic congestion    Mixed incontinence urge and stress    Chronic back pain - d/t muscle spasm    Glaucoma, open angle    Elevated CA 19-9 level    Lumbar stenosis    Spondylosis 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    Lumbar disc herniation    Asymmetric septal hypertrophy (HCC)    Non-compliance    Hiatal hernia    Melanosis coli    Coronary artery disease involving native coronary artery of native heart without angina pectoris    DM2 (diabetes mellitus, type 2) ([address]    Cigarette smoker    Marijuana use, smoked    Ischemic cardiomyopathy    PVD (peripheral vascular disease) with claudication (HCC)    Chronic venous insufficiency    History of non-ST elevation myocardial infarction (NSTEMI)    QT prolongation    History of stroke    Chronic respiratory failure with hypoxia and hypercapnia (HCC)    Acute respiratory failure with hypoxia (HCC)    COPD (chronic obstructive pulmonary disease) (HCC)    Status post peripheral artery angioplasty    Uncontrolled hypertension    Acute on chronic congestive heart failure ([facility] [address]     1. Acute on chronic HFpEF:    Improved EF from 25% in 2013 to 55-60% based on echo in 06/2021. ACC/AHA stage C. NYHA functional class III. IV Bumex. -3.3 liters. Coreg/entresto/hydralazine/imdur/aldactone. 2. Acute respiratory failure/Pneumonia: Per pulm. 3. HTN: Controlled    4. Lipids: Statin. 5. DM: Per PCP. 6. DAYAN: CPAP. 7. Chronic tobacco abuse    8. Morbid obesity    Ronaldo Cat D.O.   Cardiologist  Cardiology, [facility]

## 2021-07-18 NOTE — PROGRESS NOTES
Subjective:  917/21  76year-old woman seen on the floor earlier this morning  Admission details noted  Patient was lying in the bed flat with BiPAP on  Seems comfortable  Tolerating medications  Data reviewed in detail with her  7/18/21  Patient was seen on the [facility] earlier this [facility] at [facility]  She was sitting up in chair comfortably  On 5 L of oxygen nasal cannula  Oxygenating fairly well  No acute distress noted  Tolerating medications    Objective:    /71   Pulse 77   Temp 98.7 °F (37.1 °C) (Oral)   Resp 18   Ht 5' 1\" (1.549 m)   Wt 217 lb (98.4 kg)   LMP 01/01/1990   SpO2 93%   BMI 41.00 kg/m²   Sitting up in chair  No acute distress noted  Oral mucosa moist  Neck supple  Morbidly obese  Heart:  RRR, no murmurs, gallops, or rubs.   Lungs: Markedly diminished breath sounds noted  Abd: bowel sounds present, nontender, nondistended, no masses  Extrem:  No clubbing, cyanosis, or edema  Data reviewed    Assessment:  Acute respiratory failure  Patient Active Problem List   Diagnosis    Morbid obesity due to excess calories (HCC)    Hyperlipidemia    DAYAN and COPD overlap syndrome (HCC)    Vitamin D insufficiency    Chronic combined systolic and diastolic heart failure (HCC)    GERD (gastroesophageal reflux disease)    Major depressive disorder, recurrent episode, mild ([address]    Diabetic polyneuropathy associated with type 2 diabetes mellitus (HCC)    Chronic passive hepatic congestion    Mixed incontinence urge and stress    Chronic back pain - d/t muscle spasm    Glaucoma, open angle    Elevated CA 19-9 level    Lumbar stenosis    Spondylosis 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    Lumbar disc herniation    Asymmetric septal hypertrophy (HCC)    Non-compliance    Hiatal hernia    Melanosis coli    Coronary artery disease involving native coronary artery of native heart without angina pectoris    DM2 (diabetes mellitus, type 2) ([address]    Cigarette smoker  Marijuana use, smoked    Ischemic cardiomyopathy    PVD (peripheral vascular disease) with claudication (HCC)    Chronic venous insufficiency    History of non-ST elevation myocardial infarction (NSTEMI)    QT prolongation    History of stroke    Chronic respiratory failure with hypoxia and hypercapnia (HCC)    Acute respiratory failure with hypoxia (HCC)    COPD (chronic obstructive pulmonary disease) (HCC)    Status post peripheral artery angioplasty    Uncontrolled hypertension    Acute on chronic congestive heart failure [facility])       Plan:  Cardiology pulmonary input appreciated  Continue current medical management  Monitor labs  Wean oxygen as tolerated        Ruddy Espinoza MD  4:38 PM  7/18/2021

## 2021-07-18 NOTE — PROGRESS NOTES
Pulmonary  Progress Note    Admit Date: 7/15/2021                            PCP: Edna Rowland DO  Patient Active Problem List   Diagnosis    Morbid obesity due to excess calories ([facility]    Hyperlipidemia    DAYAN and COPD overlap syndrome (HCC)    Vitamin D insufficiency    Chronic combined systolic and diastolic heart failure (HCC)    GERD (gastroesophageal reflux disease)    Major depressive disorder, recurrent episode, mild ([address]    Diabetic polyneuropathy associated with type 2 diabetes mellitus (HCC)    Chronic passive hepatic congestion    Mixed incontinence urge and stress    Chronic back pain - d/t muscle spasm    Glaucoma, open angle    Elevated CA 19-9 level    Lumbar stenosis    Spondylosis 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    Lumbar disc herniation    Asymmetric septal hypertrophy (HCC)    Non-compliance    Hiatal hernia    Melanosis coli    Coronary artery disease involving native coronary artery of native heart without angina pectoris    DM2 (diabetes mellitus, type 2) ([facility]    Cigarette smoker    Marijuana use, smoked    Ischemic cardiomyopathy    PVD (peripheral vascular disease) with claudication ([facility]    Chronic venous insufficiency    History of non-ST elevation myocardial infarction (NSTEMI)    QT prolongation    History of stroke    Chronic respiratory failure with hypoxia and hypercapnia (HCC)    Acute respiratory failure with hypoxia (HCC)    COPD (chronic obstructive pulmonary disease) (HCC)    Status post peripheral artery angioplasty    Uncontrolled hypertension    Acute on chronic congestive heart failure (HCC)       Subjective:  Sitting up in chair   Much better today  Wore Bipap 4hrs on/4 hrs off yesterday then switched to AVAPS and wore all night  Remains coughing up sputum - clear with yellow.  Sample sent to lab, but not enough for anaylsis      Medications:   sodium chloride      dextrose          miconazole   Topical BID    methylPREDNISolone  40 mg Intravenous Q12H    ipratropium-albuterol  1 ampule Inhalation Q4H WA    Arformoterol Tartrate  15 mcg Nebulization BID    bumetanide  1 mg Intravenous BID    aspirin  81 mg Oral Daily    carvedilol  25 mg Oral BID     docusate sodium  100 mg Oral Nightly    DULoxetine  60 mg Oral BID    gabapentin  100 mg Oral BID    hydrALAZINE  50 mg Oral 3 times per day    isosorbide mononitrate  60 mg Oral Daily    metoclopramide  5 mg Oral TID    montelukast  10 mg Oral Nightly    sacubitril-valsartan  1 tablet Oral BID    trospium  20 mg Oral BID AC    spironolactone  25 mg Oral Daily    traZODone  100 mg Oral Nightly    Vitamin D  1,000 Units Oral Daily    sodium chloride flush  10 mL Intravenous 2 times per day    enoxaparin  40 mg Subcutaneous Daily    insulin lispro  0-12 Units Subcutaneous TID     insulin lispro  0-6 Units Subcutaneous Nightly    simvastatin  20 mg Oral Nightly    insulin glargine  40 Units Subcutaneous Nightly       Vitals:  Tmax:  VITALS:  BP (!) 147/70   Pulse 73   Temp 98.4 °F (36.9 °C) (Oral)   Resp 18   Ht 5' 1\" (1.549 m)   Wt 217 lb (98.4 kg)   LMP 1990   SpO2 96%   BMI 41.00 kg/m²   24HR INTAKE/OUTPUT:      Intake/Output Summary (Last 24 hours) at 2021 0837  Last data filed at 2021 0557  Gross per 24 hour   Intake 10 ml   Output 1775 ml   Net -1765 ml     CURRENT PULSE OXIMETRY:  SpO2: 96 %  24HR PULSE OXIMETRY RANGE:  SpO2  Av %  Min: 95 %  Max: 97 %  CVP:    VENT SETTINGS:   Vent Information  Skin Assessment: Clean, dry, & intact  Vt Ordered: 500 mL  FiO2 : 30 %  SpO2: 96 %  Mask Type: Full face mask  Mask Size: Small  Additional Respiratory  Assessments  Pulse: 73  Resp: 18  SpO2: 96 %      EXAM:  General: No distress. Alert. Eyes: PERRL. No sclera icterus. No conjunctival injection. ENT: No discharge. Pharynx clear. Neck: Trachea midline. Normal thyroid. Resp: No accessory muscle use. Diminished throughout. No crackles. No wheezing. No rhonchi. CV: Regular rate. Regular rhythm. No mumur or rub. Trace BLE edema. ABD: Non-tender. Non-distended. No masses. No organmegaly. Normal bowel sounds. Skin: Warm and dry. No nodule on exposed extremities. No rash on exposed extremities. Lymph: No cervical LAD. No supraclavicular LAD. M/S: No cyanosis. No joint deformity. No clubbing. Neuro: Alert. Follows commands. I/O: I/O last 3 completed shifts: In: 10 [I.V.:10]  Out: 1775 [Urine:1775]  No intake/output data recorded. Results:  CBC:   Recent Labs     07/16/21  1640 07/17/21 0720 07/18/21 0618   WBC 6.8 6.8 7.4   HGB 11.8 11.6 11.0*   HCT 39.8 39.6 37.0   .5* 99.5 97.4    296 267     BMP:   Recent Labs     07/16/21  0255 07/17/21  0720 07/18/21 0618    141 139   K 4.0 4.3 4.7    97* 95*   CO2 32* 36* 36*   BUN 17 20 25*   CREATININE 0.9 1.1* 1.1*       Cultures:  Results for Canelo Flanagan (MRN [id number]) as of 7/18/2021 08:39   Ref.  Range 7/17/2021 13:00   Influenza A by PCR Latest Ref Range: Not Detected  Not Detected   Influenza B by PCR Latest Ref Range: Not Detected  Not Detected   Adenovirus by PCR Latest Ref Range: Not Detected  Not Detected   Coronavirus 229E by PCR Latest Ref Range: Not Detected  Not Detected   Coronavirus HKU1 by PCR Latest Ref Range: Not Detected  Not Detected   Coronavirus NL63 by PCR Latest Ref Range: Not Detected  Not Detected   Coronavirus OC43 by PCR Latest Ref Range: Not Detected  Not Detected   Human Metapneumovirus by PCR Latest Ref Range: Not Detected  Not Detected   Human Rhinovirus/Enterovirus by PCR Latest Ref Range: Not Detected  Not Detected   Parainfluenza Virus 1 by PCR Latest Ref Range: Not Detected  Not Detected   Parainfluenza Virus 2 by PCR Latest Ref Range: Not Detected  Not Detected   Parainfluenza Virus 3 by PCR Latest Ref Range: Not Detected  Not Detected   Parainfluenza Virus 4 by PCR Latest Ref Range: Not Detected  Not Detected   Respiratory Syncytial Virus by PCR Latest Ref Range: Not Detected  Not Detected   Bordetella parapertussis by PCR Latest Ref Range: Not Detected  Not Detected   Chlamydophilia pneumoniae by PCR Latest Ref Range: Not Detected  Not Detected   Mycoplasma pneumoniae by PCR Latest Ref Range: Not Detected  Not Detected   SARS-CoV-2, PCR Latest Ref Range: Not Detected  Not Detected   Bordetella pertussis by PCR Latest Ref Range: Not Detected  Not Detected       ABG:   Results for Gumaro Thomas (MRN [id number]) as of 7/17/2021 09:12   Ref. Range 7/16/2021 09:34   Source: Unknown Blood Arterial   pH, Blood Gas Latest Ref Range: 7.350 - 7.450  7.370   PCO2 Latest Ref Range: 35.0 - 45.0 mmHg 66.0 (H)   pO2 Latest Ref Range: 75.0 - 100.0 mmHg 72.8 (L)   HCO3 Latest Ref Range: 22.0 - 26.0 mmol/L 37.3 (H)     Results for Gumaro Thomas (MRN [id number]) as of 7/18/2021 08:39   Ref. Range 7/18/2021 06:54   pH, Blood Gas Latest Ref Range: 7.350 - 7.450  7.448   PCO2 Latest Ref Range: 35.0 - 45.0 mmHg 55.9 (H)   pO2 Latest Ref Range: 75.0 - 100.0 mmHg 76.9   HCO3 Latest Ref Range: 22.0 - 26.0 mmol/L 37.8 (H)       Films:  7/15/21 CXR : Impression Opacities may represent moderate pulmonary edema. Multifocal pneumonia may also be considered in the proper clinical setting. Small left-sided pleural effusion. Assessment:  · Acute hypoxemic respiratory failure  · Acute exacerbation of COPD  · Previous COVID infection   · DAYAN   · Current smoker  · CHF - cardiomyopathy  · CAD s/p CABG  · HTN  · DM      Plan:  · Continue 3L nc, wean as able to keep pox >90%. Baseline room air - was on previous O2 at home secondary to Pedro. Will need ambulatory pulse ox prior to d/c if still on O2. · Continue AVAPS HS and prn - ABG with pCO2 55.9 improved from 72 yesterday. Will continue to follow. · BNP elevated with congestion on CXR - cardiology consulted and changed diuretic to IV BID. 3.2 L out since admit.  On aldactone also.  · Will continue brovana and duonebs  · Change steroids to po  · Check sputum culture -  respiratory panel negative  · IS    PATRIZIA Lam - CNP

## 2021-07-18 NOTE — PROGRESS NOTES
Date: 7/18/2021    Time: 1:47 AM    Patient Placed On BIPAP/CPAP/ Non-Invasive Ventilation? No    If no must comment. Facial area red/color change? If YES are Blister/Lesion present? If yes must notify nursing staff  BIPAP/CPAP skin barrier?   Yes    Skin barrier type:mepilexlite       Comments:  Red outlet already on  Unable to see under mepilex      Rizwana Olvera RCP

## 2021-07-19 LAB
ALBUMIN SERPL-MCNC: 3.5 G/DL (ref 3.5–5.2)
ALP BLD-CCNC: 90 U/L (ref 35–104)
ALT SERPL-CCNC: 13 U/L (ref 0–32)
ANION GAP SERPL CALCULATED.3IONS-SCNC: 6 MMOL/L (ref 7–16)
AST SERPL-CCNC: 10 U/L (ref 0–31)
BASOPHILS ABSOLUTE: 0.01 E9/L (ref 0–0.2)
BASOPHILS RELATIVE PERCENT: 0.1 % (ref 0–2)
BILIRUB SERPL-MCNC: 0.3 MG/DL (ref 0–1.2)
BUN BLDV-MCNC: 30 MG/DL (ref 6–23)
CALCIUM SERPL-MCNC: 9.5 MG/DL (ref 8.6–10.2)
CHLORIDE BLD-SCNC: 90 MMOL/L (ref 98–107)
CO2: 39 MMOL/L (ref 22–29)
CREAT SERPL-MCNC: 1.2 MG/DL (ref 0.5–1)
EOSINOPHILS ABSOLUTE: 0 E9/L (ref 0.05–0.5)
EOSINOPHILS RELATIVE PERCENT: 0 % (ref 0–6)
GFR AFRICAN AMERICAN: 54
GFR NON-AFRICAN AMERICAN: 54 ML/MIN/1.73
GLUCOSE BLD-MCNC: 239 MG/DL (ref 74–99)
HCT VFR BLD CALC: 39.7 % (ref 34–48)
HEMOGLOBIN: 12 G/DL (ref 11.5–15.5)
IMMATURE GRANULOCYTES #: 0.03 E9/L
IMMATURE GRANULOCYTES %: 0.4 % (ref 0–5)
LYMPHOCYTES ABSOLUTE: 0.85 E9/L (ref 1.5–4)
LYMPHOCYTES RELATIVE PERCENT: 11 % (ref 20–42)
MCH RBC QN AUTO: 29.4 PG (ref 26–35)
MCHC RBC AUTO-ENTMCNC: 30.2 % (ref 32–34.5)
MCV RBC AUTO: 97.3 FL (ref 80–99.9)
METER GLUCOSE: 195 MG/DL (ref 74–99)
METER GLUCOSE: 225 MG/DL (ref 74–99)
METER GLUCOSE: 368 MG/DL (ref 74–99)
METER GLUCOSE: 404 MG/DL (ref 74–99)
MONOCYTES ABSOLUTE: 0.58 E9/L (ref 0.1–0.95)
MONOCYTES RELATIVE PERCENT: 7.5 % (ref 2–12)
NEUTROPHILS ABSOLUTE: 6.25 E9/L (ref 1.8–7.3)
NEUTROPHILS RELATIVE PERCENT: 81 % (ref 43–80)
PDW BLD-RTO: 13.7 FL (ref 11.5–15)
PLATELET # BLD: 267 E9/L (ref 130–450)
PMV BLD AUTO: 10.2 FL (ref 7–12)
POTASSIUM REFLEX MAGNESIUM: 4.3 MMOL/L (ref 3.5–5)
PRO-BNP: 1543 PG/ML (ref 0–125)
RBC # BLD: 4.08 E12/L (ref 3.5–5.5)
SODIUM BLD-SCNC: 135 MMOL/L (ref 132–146)
TOTAL PROTEIN: 6.7 G/DL (ref 6.4–8.3)
WBC # BLD: 7.7 E9/L (ref 4.5–11.5)

## 2021-07-19 PROCEDURE — 85025 COMPLETE CBC W/AUTO DIFF WBC: CPT

## 2021-07-19 PROCEDURE — 94660 CPAP INITIATION&MGMT: CPT

## 2021-07-19 PROCEDURE — 6370000000 HC RX 637 (ALT 250 FOR IP): Performed by: NURSE PRACTITIONER

## 2021-07-19 PROCEDURE — 82962 GLUCOSE BLOOD TEST: CPT

## 2021-07-19 PROCEDURE — 6370000000 HC RX 637 (ALT 250 FOR IP): Performed by: INTERNAL MEDICINE

## 2021-07-19 PROCEDURE — 97530 THERAPEUTIC ACTIVITIES: CPT

## 2021-07-19 PROCEDURE — 2700000000 HC OXYGEN THERAPY PER DAY

## 2021-07-19 PROCEDURE — 94640 AIRWAY INHALATION TREATMENT: CPT

## 2021-07-19 PROCEDURE — 2500000003 HC RX 250 WO HCPCS: Performed by: NURSE PRACTITIONER

## 2021-07-19 PROCEDURE — 6360000002 HC RX W HCPCS: Performed by: INTERNAL MEDICINE

## 2021-07-19 PROCEDURE — 2060000000 HC ICU INTERMEDIATE R&B

## 2021-07-19 PROCEDURE — 36415 COLL VENOUS BLD VENIPUNCTURE: CPT

## 2021-07-19 PROCEDURE — 2580000003 HC RX 258: Performed by: INTERNAL MEDICINE

## 2021-07-19 PROCEDURE — 80053 COMPREHEN METABOLIC PANEL: CPT

## 2021-07-19 PROCEDURE — 83880 ASSAY OF NATRIURETIC PEPTIDE: CPT

## 2021-07-19 PROCEDURE — 99233 SBSQ HOSP IP/OBS HIGH 50: CPT | Performed by: INTERNAL MEDICINE

## 2021-07-19 RX ORDER — POLYETHYLENE GLYCOL 3350 17 G/17G
17 POWDER, FOR SOLUTION ORAL DAILY
Status: DISCONTINUED | OUTPATIENT
Start: 2021-07-19 | End: 2021-07-21 | Stop reason: HOSPADM

## 2021-07-19 RX ADMIN — INSULIN GLARGINE 40 UNITS: 100 INJECTION, SOLUTION SUBCUTANEOUS at 20:45

## 2021-07-19 RX ADMIN — TRAMADOL HYDROCHLORIDE 50 MG: 50 TABLET, FILM COATED ORAL at 15:39

## 2021-07-19 RX ADMIN — ISOSORBIDE MONONITRATE 60 MG: 60 TABLET, EXTENDED RELEASE ORAL at 09:20

## 2021-07-19 RX ADMIN — TRAZODONE HYDROCHLORIDE 100 MG: 50 TABLET ORAL at 20:41

## 2021-07-19 RX ADMIN — Medication 10 ML: at 09:20

## 2021-07-19 RX ADMIN — TROSPIUM CHLORIDE 20 MG: 20 TABLET, FILM COATED ORAL at 16:26

## 2021-07-19 RX ADMIN — TROSPIUM CHLORIDE 20 MG: 20 TABLET, FILM COATED ORAL at 06:19

## 2021-07-19 RX ADMIN — NYSTATIN 500000 UNITS: 500000 SUSPENSION ORAL at 13:56

## 2021-07-19 RX ADMIN — INSULIN LISPRO 2 UNITS: 100 INJECTION, SOLUTION INTRAVENOUS; SUBCUTANEOUS at 11:41

## 2021-07-19 RX ADMIN — METOCLOPRAMIDE 5 MG: 5 TABLET ORAL at 06:19

## 2021-07-19 RX ADMIN — MICONAZOLE NITRATE: 20 POWDER TOPICAL at 09:22

## 2021-07-19 RX ADMIN — SPIRONOLACTONE 25 MG: 25 TABLET ORAL at 09:18

## 2021-07-19 RX ADMIN — DULOXETINE HYDROCHLORIDE 60 MG: 60 CAPSULE, DELAYED RELEASE ORAL at 20:41

## 2021-07-19 RX ADMIN — DOCUSATE SODIUM 100 MG: 100 CAPSULE, LIQUID FILLED ORAL at 20:41

## 2021-07-19 RX ADMIN — ARFORMOTEROL TARTRATE 15 MCG: 15 SOLUTION RESPIRATORY (INHALATION) at 20:17

## 2021-07-19 RX ADMIN — TRAMADOL HYDROCHLORIDE 50 MG: 50 TABLET, FILM COATED ORAL at 09:19

## 2021-07-19 RX ADMIN — ENOXAPARIN SODIUM 40 MG: 40 INJECTION SUBCUTANEOUS at 09:20

## 2021-07-19 RX ADMIN — Medication 10 ML: at 20:45

## 2021-07-19 RX ADMIN — PREDNISONE 40 MG: 20 TABLET ORAL at 09:19

## 2021-07-19 RX ADMIN — TRAMADOL HYDROCHLORIDE 50 MG: 50 TABLET, FILM COATED ORAL at 21:47

## 2021-07-19 RX ADMIN — HYDRALAZINE HYDROCHLORIDE 50 MG: 50 TABLET, FILM COATED ORAL at 05:25

## 2021-07-19 RX ADMIN — METOCLOPRAMIDE 5 MG: 5 TABLET ORAL at 11:41

## 2021-07-19 RX ADMIN — NYSTATIN 500000 UNITS: 500000 SUSPENSION ORAL at 20:44

## 2021-07-19 RX ADMIN — BUMETANIDE 1 MG: 0.25 INJECTION, SOLUTION INTRAMUSCULAR; INTRAVENOUS at 09:20

## 2021-07-19 RX ADMIN — IPRATROPIUM BROMIDE AND ALBUTEROL SULFATE 1 AMPULE: 2.5; .5 SOLUTION RESPIRATORY (INHALATION) at 09:32

## 2021-07-19 RX ADMIN — ARFORMOTEROL TARTRATE 15 MCG: 15 SOLUTION RESPIRATORY (INHALATION) at 09:32

## 2021-07-19 RX ADMIN — HYDRALAZINE HYDROCHLORIDE 50 MG: 50 TABLET, FILM COATED ORAL at 13:56

## 2021-07-19 RX ADMIN — CARVEDILOL 25 MG: 25 TABLET, FILM COATED ORAL at 15:39

## 2021-07-19 RX ADMIN — IPRATROPIUM BROMIDE AND ALBUTEROL SULFATE 1 AMPULE: 2.5; .5 SOLUTION RESPIRATORY (INHALATION) at 20:17

## 2021-07-19 RX ADMIN — MONTELUKAST SODIUM 10 MG: 10 TABLET, FILM COATED ORAL at 20:41

## 2021-07-19 RX ADMIN — INSULIN LISPRO 6 UNITS: 100 INJECTION, SOLUTION INTRAVENOUS; SUBCUTANEOUS at 06:12

## 2021-07-19 RX ADMIN — ACETAMINOPHEN 650 MG: 325 TABLET ORAL at 13:55

## 2021-07-19 RX ADMIN — INSULIN LISPRO 6 UNITS: 100 INJECTION, SOLUTION INTRAVENOUS; SUBCUTANEOUS at 20:45

## 2021-07-19 RX ADMIN — CARVEDILOL 25 MG: 25 TABLET, FILM COATED ORAL at 09:18

## 2021-07-19 RX ADMIN — SACUBITRIL AND VALSARTAN 1 TABLET: 49; 51 TABLET, FILM COATED ORAL at 20:41

## 2021-07-19 RX ADMIN — GUAIFENESIN 400 MG: 400 TABLET ORAL at 21:47

## 2021-07-19 RX ADMIN — NYSTATIN 500000 UNITS: 500000 SUSPENSION ORAL at 16:26

## 2021-07-19 RX ADMIN — GUAIFENESIN 400 MG: 400 TABLET ORAL at 09:19

## 2021-07-19 RX ADMIN — TRAMADOL HYDROCHLORIDE 50 MG: 50 TABLET, FILM COATED ORAL at 00:12

## 2021-07-19 RX ADMIN — GABAPENTIN 100 MG: 100 CAPSULE ORAL at 20:41

## 2021-07-19 RX ADMIN — IPRATROPIUM BROMIDE AND ALBUTEROL SULFATE 1 AMPULE: 2.5; .5 SOLUTION RESPIRATORY (INHALATION) at 14:45

## 2021-07-19 RX ADMIN — GABAPENTIN 100 MG: 100 CAPSULE ORAL at 09:18

## 2021-07-19 RX ADMIN — STANDARDIZED SENNA CONCENTRATE 8.6 MG: 8.6 TABLET ORAL at 09:18

## 2021-07-19 RX ADMIN — TROSPIUM CHLORIDE 20 MG: 20 TABLET, FILM COATED ORAL at 09:21

## 2021-07-19 RX ADMIN — POLYETHYLENE GLYCOL 3350 17 G: 17 POWDER, FOR SOLUTION ORAL at 09:16

## 2021-07-19 RX ADMIN — MICONAZOLE NITRATE: 20 POWDER TOPICAL at 20:51

## 2021-07-19 RX ADMIN — BUMETANIDE 1 MG: 0.25 INJECTION, SOLUTION INTRAMUSCULAR; INTRAVENOUS at 20:43

## 2021-07-19 RX ADMIN — HYDRALAZINE HYDROCHLORIDE 50 MG: 50 TABLET, FILM COATED ORAL at 21:47

## 2021-07-19 RX ADMIN — ASPIRIN 81 MG: 81 TABLET, CHEWABLE ORAL at 09:19

## 2021-07-19 RX ADMIN — DULOXETINE HYDROCHLORIDE 60 MG: 60 CAPSULE, DELAYED RELEASE ORAL at 09:20

## 2021-07-19 RX ADMIN — Medication 1000 UNITS: at 09:20

## 2021-07-19 RX ADMIN — SACUBITRIL AND VALSARTAN 1 TABLET: 49; 51 TABLET, FILM COATED ORAL at 09:22

## 2021-07-19 RX ADMIN — INSULIN LISPRO 6 UNITS: 100 INJECTION, SOLUTION INTRAVENOUS; SUBCUTANEOUS at 15:40

## 2021-07-19 RX ADMIN — METOCLOPRAMIDE 5 MG: 5 TABLET ORAL at 15:39

## 2021-07-19 ASSESSMENT — PAIN DESCRIPTION - DESCRIPTORS
DESCRIPTORS: DULL;HEADACHE
DESCRIPTORS: ACHING;CONSTANT;DISCOMFORT
DESCRIPTORS: ACHING
DESCRIPTORS: ACHING;CONSTANT;DISCOMFORT

## 2021-07-19 ASSESSMENT — PAIN DESCRIPTION - ORIENTATION
ORIENTATION: RIGHT;LEFT;LOWER
ORIENTATION: RIGHT;LEFT;LOWER

## 2021-07-19 ASSESSMENT — PAIN DESCRIPTION - PAIN TYPE
TYPE: CHRONIC PAIN
TYPE: ACUTE PAIN

## 2021-07-19 ASSESSMENT — PAIN DESCRIPTION - LOCATION
LOCATION: BACK

## 2021-07-19 ASSESSMENT — PAIN DESCRIPTION - ONSET
ONSET: GRADUAL
ONSET: ON-GOING
ONSET: ON-GOING

## 2021-07-19 ASSESSMENT — PAIN DESCRIPTION - FREQUENCY
FREQUENCY: CONTINUOUS

## 2021-07-19 ASSESSMENT — PAIN - FUNCTIONAL ASSESSMENT
PAIN_FUNCTIONAL_ASSESSMENT: ACTIVITIES ARE NOT PREVENTED

## 2021-07-19 ASSESSMENT — PAIN DESCRIPTION - DIRECTION: RADIATING_TOWARDS: NON-RADIATING

## 2021-07-19 ASSESSMENT — PAIN SCALES - GENERAL
PAINLEVEL_OUTOF10: 8
PAINLEVEL_OUTOF10: 10
PAINLEVEL_OUTOF10: 2
PAINLEVEL_OUTOF10: 0
PAINLEVEL_OUTOF10: 7
PAINLEVEL_OUTOF10: 3
PAINLEVEL_OUTOF10: 10
PAINLEVEL_OUTOF10: 8
PAINLEVEL_OUTOF10: 7
PAINLEVEL_OUTOF10: 10

## 2021-07-19 NOTE — CARE COORDINATION
7/19/2021  Social Work Discharge [address] 20/24. Plan is home alone with [facility] who Pt is active with. VIRGIE order will be needed. Pt is on 3l o2 here and uses this via [facility]. Pt also has an aide who comes into the home 7 days/week.  Electronically signed by SINDHU Monroe on 7/19/2021 at 9:16 AM

## 2021-07-19 NOTE — PROGRESS NOTES
INPATIENT CARDIOLOGY FOLLOW-UP    Name: Annie Kauffman    Age: 76 y.o. Date of Admission: 7/15/2021  2:20 PM    Date of Service: 7/19/2021    Chief Complaint: Follow-up for acute superimposed upon chronic diastolic heart failure, acute hypoxic respiratory failure, hypertension, chronic obstructive lung disease, morbid obesity, obstructive sleep apnea    Interim History: The patient present remains compensated from a cardiovascular standpoint with ongoing fluid mobilization and stable renal function beyond that of prerenal azotemia. Most recent pulmonary assessment and recommendations have been reviewed. Review of Systems: The remainder of a complete multisystem review including consitutional, central nervous, respiratory, circulatory, gastrointestinal, genitourinary, endocrinologic, hematologic, musculoskeletal and psychiatric are negative.     Problem List:  Patient Active Problem List   Diagnosis    Morbid obesity due to excess calories (HCC)    Hyperlipidemia    DAYAN and COPD overlap syndrome (HCC)    Vitamin D insufficiency    Chronic combined systolic and diastolic heart failure (HCC)    GERD (gastroesophageal reflux disease)    Major depressive disorder, recurrent episode, mild ([address]    Diabetic polyneuropathy associated with type 2 diabetes mellitus (HCC)    Chronic passive hepatic congestion    Mixed incontinence urge and stress    Chronic back pain - d/t muscle spasm    Glaucoma, open angle    Elevated CA 19-9 level    Lumbar stenosis    Spondylosis 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    Lumbar disc herniation    Asymmetric septal hypertrophy (HCC)    Non-compliance    Hiatal hernia    Melanosis coli    Coronary artery disease involving native coronary artery of native heart without angina pectoris    DM2 (diabetes mellitus, type 2) ([address]    Cigarette smoker    Marijuana use, smoked    Ischemic cardiomyopathy    PVD (peripheral vascular disease) with claudication ([facility]    Chronic venous insufficiency    History of non-ST elevation myocardial infarction (NSTEMI)    QT prolongation    History of stroke    Chronic respiratory failure with hypoxia and hypercapnia (HCC)    Acute respiratory failure with hypoxia (HCC)    COPD [facility]ch[facility]) [facility]    Status post peripheral artery angioplasty    Uncontrolled hypertension    Acute on chronic congestive heart failure ([facility])       Allergies:   Allergies   Allergen Reactions    Latex Hives    Bee Venom Anaphylaxis    Dilaudid [Hydromorphone Hcl] Itching    Dye [Iodides] Hives and Shortness Of Breath    Percocet [Oxycodone-Acetaminophen] Shortness Of Breath and Itching    Keflex [Cephalexin] Itching and Rash    Lasix [Furosemide] Other (See Comments)     Pt states she cramps up and gets headaches    Levaquin [Levofloxacin In D5w] Hives    Lipitor      MUSCLE SPASMS    Lyrica [Pregabalin]      Dream disturbances    Morphine Hives    Naproxen      Unsure of reaction;pt states able to take Aleve without difficulties    Nefazodone Other (See Comments)    Norvasc [Amlodipine] Swelling    Oxycodone-Acetaminophen Swelling    Shellfish-Derived Products     Trazodone And Nefazodone        Current Medications:  Current Facility-Administered Medications   Medication Dose Route Frequency Provider Last Rate Last Admin    predniSONE (DELTASONE) tablet 40 mg  40 mg Oral Daily with breakfast Lum PATRIZIA Floyd CNP   40 mg at 07/18/21 1018    miconazole (MICOTIN) 2 % powder   Topical BID Merline Hawking, MD   Given at 07/18/21 2108    ipratropium-albuterol (DUONEB) nebulizer solution 1 ampule  1 ampule Inhalation Q4H WA Harini Julian MD   1 ampule at 07/18/21 2047    Arformoterol Tartrate (BROVANA) nebulizer solution 15 mcg  15 mcg Nebulization BID Harini Julian MD   15 mcg at 07/18/21 2047    bumetanide (BUMEX) injection 1 mg  1 mg Intravenous BID PATRIZIA Marin - CNP 1 mg at 07/18/21 2055    traMADol (ULTRAM) tablet 50 mg  50 mg Oral Q6H PRN Marvin Del Angel, DO   50 mg at 07/19/21 0012    aspirin chewable tablet 81 mg  81 mg Oral Daily Marvin Del Angel, DO   81 mg at 07/18/21 0828    baclofen (LIORESAL) tablet 10 mg  10 mg Oral BID PRN Marvin Del Angel, DO   10 mg at 07/16/21 8857    carvedilol (COREG) tablet 25 mg  25 mg Oral BID WC Marvin Del Angel, DO   25 mg at 07/18/21 1625    docusate sodium (COLACE) capsule 100 mg  100 mg Oral Nightly Marvin Del Angel, DO   100 mg at 07/18/21 2057    DULoxetine (CYMBALTA) extended release capsule 60 mg  60 mg Oral BID Marvin Del Angel, DO   60 mg at 07/18/21 2056    gabapentin (NEURONTIN) capsule 100 mg  100 mg Oral BID Marvin Del Angel, DO   100 mg at 07/18/21 2057    guaiFENesin tablet 400 mg  400 mg Oral BID PRN Marvin Del Angel, DO   400 mg at 07/18/21 2103    hydrALAZINE (APRESOLINE) tablet 50 mg  50 mg Oral 3 times per day Marvin Del Angel, DO   50 mg at 07/19/21 0525    isosorbide mononitrate (IMDUR) extended release tablet 60 mg  60 mg Oral Daily Marvin Del Angel, DO   60 mg at 07/18/21 0828    metoclopramide (REGLAN) tablet 5 mg  5 mg Oral TID Marvin Del Angel DO   5 mg at 07/19/21 0619    montelukast (SINGULAIR) tablet 10 mg  10 mg Oral Nightly Marvin Del Angel, DO   10 mg at 07/18/21 2056    sacubitril-valsartan (ENTRESTO) 49-51 MG per tablet 1 tablet  1 tablet Oral BID Marvin Del Angel, DO   1 tablet at 07/18/21 2107    trospium (SANCTURA) tablet 20 mg  20 mg Oral BID  Marvin Del Angel, DO   20 mg at 07/19/21 8555    spironolactone (ALDACTONE) tablet 25 mg  25 mg Oral Daily Marvin Del Angel, DO   25 mg at 07/18/21 0828    traZODone (DESYREL) tablet 100 mg  100 mg Oral Nightly Marvin Del Angel DO   100 mg at 07/18/21 2055    Vitamin D (CHOLECALCIFEROL) tablet 1,000 Units  1,000 Units Oral Daily Marvin Del Angel DO   1,000 Units at 07/18/21 0828    sodium chloride flush 0.9 % injection 10 mL  10 mL Intravenous 2 times per day Marvin CRUZ Volino, DO   10 mL at 07/18/21 2107    sodium chloride flush 0.9 % injection 10 mL  10 mL Intravenous PRN Marvin E Volino, DO        0.9 % sodium chloride infusion  25 mL Intravenous PRN Marvin E Volino, DO        potassium chloride (KLOR-CON M) extended release tablet 40 mEq  40 mEq Oral PRN Marvin E Volino, DO        Or    potassium bicarb-citric acid (EFFER-K) effervescent tablet 40 mEq  40 mEq Oral PRN Marvin E Volino, DO        Or    potassium chloride 10 mEq/100 mL IVPB (Peripheral Line)  10 mEq Intravenous PRN Marvin E Volino, DO        enoxaparin (LOVENOX) injection 40 mg  40 mg Subcutaneous Daily Marvin E Volino, DO   40 mg at 07/18/21 0827    senna (SENOKOT) tablet 8.6 mg  1 tablet Oral Daily PRN Marvin E Volino, DO        acetaminophen (TYLENOL) tablet 650 mg  650 mg Oral Q6H PRN Marvin Nelsonino, DO        Or    acetaminophen (TYLENOL) suppository 650 mg  650 mg Rectal Q6H PRN Marvin CRUZ Volino, DO        insulin lispro (HUMALOG) injection vial 0-12 Units  0-12 Units Subcutaneous TID WC Marvin E Volino, DO   6 Units at 07/19/21 0612    insulin lispro (HUMALOG) injection vial 0-6 Units  0-6 Units Subcutaneous Nightly Marvin E Volino, DO   6 Units at 07/18/21 2057    glucose (GLUTOSE) 40 % oral gel 15 g  15 g Oral PRN Marvin Nelsonino, DO        dextrose 50 % IV solution  12.5 g Intravenous PRN Marvin Nelsonino, DO        glucagon (rDNA) injection 1 mg  1 mg Intramuscular PRN Marvin Nelsonino, DO        dextrose 5 % solution  100 mL/hr Intravenous PRN Marvin E Volino, DO        labetalol (NORMODYNE;TRANDATE) injection 5 mg  5 mg Intravenous Q4H PRN Marvin E Volino, DO        simvastatin (ZOCOR) tablet 20 mg  20 mg Oral Nightly Marvin E Volino, DO        insulin glargine (LANTUS) injection vial 40 Units  40 Units Subcutaneous Nightly Marvin E Volino, DO   40 Units at 07/18/21 2058      sodium chloride      dextrose         Physical Exam:  BP (!) 168/94   Pulse 69   Temp 98.5 °F (36.9 °C) (Oral)   Resp 20 Ht 5' 1\" (1.549 m)   Wt 219 lb 6.4 oz (99.5 kg)   LMP 01/01/1990   SpO2 93%   BMI 41.46 kg/m²   Weight change: Wt Readings from Last 3 Encounters:   07/19/21 219 lb 6.4 oz (99.5 kg)   06/13/21 229 lb (103.9 kg)   05/20/21 219 lb (99.3 kg)     The patient is sleeping with the appropriate use of nocturnal CPAP and in no discomfort or distress. No gross musculoskeletal deformity is present. No significant skin or nail changes are present. Gross examination of head, eyes, nose and throat are negative. Jugular venous pressure is normal and no carotid bruits are present. Normal respiratory effort is noted with no accessory muscle usage present. Lung fields are clear to ascultation. Cardiac examination is notable for a regular rate and rhythm with no palpable thrill. No gallop rhythm or cardiac murmur are identified. A benign abdominal examination is present with the exception of obesity no masses or organomegaly. Intact pulses are present throughout all extremities and no significant peripheral edema is present. No focal neurologic deficits are present. Intake/Output:    Intake/Output Summary (Last 24 hours) at 7/19/2021 0806  Last data filed at 7/19/2021 0532  Gross per 24 hour   Intake 154 ml   Output 3600 ml   Net -3446 ml     No intake/output data recorded. Laboratory Tests:  Lab Results   Component Value Date    CREATININE 1.2 (H) 07/19/2021    BUN 30 (H) 07/19/2021     07/19/2021    K 4.3 07/19/2021    CL 90 (L) 07/19/2021    CO2 39 (H) 07/19/2021     No results for input(s): CKTOTAL, CKMB in the last 72 hours.     Invalid input(s): TROPONONI  Lab Results   Component Value Date     (H) 01/26/2014     Lab Results   Component Value Date    WBC 7.7 07/19/2021    RBC 4.08 07/19/2021    HGB 12.0 07/19/2021    HCT 39.7 07/19/2021    MCV 97.3 07/19/2021    MCH 29.4 07/19/2021    MCHC 30.2 07/19/2021    RDW 13.7 07/19/2021     07/19/2021    MPV 10.2 07/19/2021     Recent Labs 07/17/21  0720 07/18/21  0618 07/19/21  0420   ALKPHOS 77 75 90   ALT 9 9 13   AST 6 6 10   PROT 7.2 6.7 6.7   BILITOT 0.3 0.3 0.3   LABALBU 3.7 3.3* 3.5     Lab Results   Component Value Date    MG 2.0 04/13/2019     Lab Results   Component Value Date    PROTIME 12.6 04/19/2021    PROTIME 12.6 04/22/2011    INR 1.1 04/19/2021     Lab Results   Component Value Date    TSH 0.646 02/05/2021     No components found for: CHLPL  Lab Results   Component Value Date    TRIG 219 (H) 02/05/2021    TRIG 89 06/16/2020    TRIG 84 06/05/2018     Lab Results   Component Value Date    HDL 27 02/05/2021    HDL 46 06/16/2020    HDL 38 06/05/2018     Lab Results   Component Value Date    LDLCALC 60 02/05/2021    LDLCALC 98 06/16/2020    LDLCALC 167 (H) 06/05/2018       Cardiac Tests:  Telemetry findings reviewed: sinus rhythm with 3 beat asymptomatic nonsustained ventricular tachycardia, no new tachy/bradyarrhythmias overnight      ASSESSMENT / PLAN: On a clinical basis, the patient remains compensated from a cardiovascular standpoint in the face of her chronic diastolic heart failure as well as that of her acute superimposed upon chronic hypoxic respiratory failure and probable pneumonia. Continued supportive care remains essential inclusive of fluid mobilization with persistent elevation of proBNP levels and a gradually progressive prerenal azotemia. Ongoing fluid mobilization will be necessary with at least an additional 24 hours of intravenous diuresis with continued careful monitoring of her volume status as well as that of renal function and electrolytes. Additional management will additionally be deferred to the pulmonary service in regards to her chronic pulmonary disease. Appropriate nutritional support will be essential to ongoing fluid mobilization.  I have a long-term basis appropriate lifestyle modification both related to smoking cessation to reduce risk of further adverse effects on her chronic obstructive lung disease as well as that of atherosclerotic risk in addition to that of aggressive risk factor modification blood pressure, diabetes and serum lipids will remain essential to reducing risk of future atherosclerotic development. Note: This report was completed utilizing computer voice recognition software. Every effort has been made to ensure accuracy, however; inadvertent computerized transcription errors may be present. Mariana Briggs.  Ananda Mann, [facility]

## 2021-07-19 NOTE — PROGRESS NOTES
Pulmonary  Progress Note    Admit Date: 7/15/2021                            PCP: Jessica Leiva DO  Patient Active Problem List   Diagnosis    Morbid obesity due to excess calories ([facility] [address]    Hyperlipidemia    DAYAN and COPD overlap syndrome (HCC)    Vitamin D insufficiency    Chronic combined systolic and diastolic heart failure (HCC)    GERD (gastroesophageal reflux disease)    Major depressive disorder, recurrent episode, mild ([address]    Diabetic polyneuropathy associated with type 2 diabetes mellitus (HCC)    Chronic passive hepatic congestion    Mixed incontinence urge and stress    Chronic back pain - d/t muscle spasm    Glaucoma, open angle    Elevated CA 19-9 level    Lumbar stenosis    Spondylosis 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    Lumbar disc herniation    Asymmetric septal hypertrophy (HCC)    Non-compliance    Hiatal hernia    Melanosis coli    Coronary artery disease involving native coronary artery of native heart without angina pectoris    DM2 (diabetes mellitus, type 2) ([facility] [address]    Cigarette smoker    Marijuana use, smoked    Ischemic cardiomyopathy    PVD (peripheral vascular disease) with claudication ([facility] [address]    Chronic venous insufficiency    History of non-ST elevation myocardial infarction (NSTEMI)    QT prolongation    History of stroke    Chronic respiratory failure with hypoxia and hypercapnia (HCC)    Acute respiratory failure with hypoxia (HCC)    COPD (chronic obstructive pulmonary disease) (HCC)    Status post peripheral artery angioplasty    Uncontrolled hypertension    Acute on chronic congestive heart failure (HCC)       Subjective:  Sitting up in chair  Wore AVAPS all night - feels like the mask is too tight, did not sleep well  Breathing is better, still with cough   Sputum sent with mod gram + cocci in chains      Medications:   sodium chloride      dextrose          polyethylene glycol  17 g Oral Daily    predniSONE  40 mg Oral Daily with breakfast    miconazole   Topical BID    ipratropium-albuterol  1 ampule Inhalation Q4H WA    Arformoterol Tartrate  15 mcg Nebulization BID    bumetanide  1 mg Intravenous BID    aspirin  81 mg Oral Daily    carvedilol  25 mg Oral BID     docusate sodium  100 mg Oral Nightly    DULoxetine  60 mg Oral BID    gabapentin  100 mg Oral BID    hydrALAZINE  50 mg Oral 3 times per day    isosorbide mononitrate  60 mg Oral Daily    metoclopramide  5 mg Oral TID    montelukast  10 mg Oral Nightly    sacubitril-valsartan  1 tablet Oral BID    trospium  20 mg Oral BID AC    spironolactone  25 mg Oral Daily    traZODone  100 mg Oral Nightly    Vitamin D  1,000 Units Oral Daily    sodium chloride flush  10 mL Intravenous 2 times per day    enoxaparin  40 mg Subcutaneous Daily    insulin lispro  0-12 Units Subcutaneous TID WC    insulin lispro  0-6 Units Subcutaneous Nightly    simvastatin  20 mg Oral Nightly    insulin glargine  40 Units Subcutaneous Nightly       Vitals:  Tmax:  VITALS:  BP (!) 168/94   Pulse 69   Temp 98.5 °F (36.9 °C) (Oral)   Resp 20   Ht 5' 1\" (1.549 m)   Wt 219 lb 6.4 oz (99.5 kg)   LMP 1990   SpO2 93%   BMI 41.46 kg/m²   24HR INTAKE/OUTPUT:      Intake/Output Summary (Last 24 hours) at 2021 0936  Last data filed at 2021 0532  Gross per 24 hour   Intake 154 ml   Output 3600 ml   Net -3446 ml     CURRENT PULSE OXIMETRY:  SpO2: 93 %  24HR PULSE OXIMETRY RANGE:  SpO2  Av.8 %  Min: 93 %  Max: 95 %  CVP:    VENT SETTINGS:   Vent Information  Skin Assessment: Clean, dry, & intact  Vt Ordered: 500 mL  FiO2 : 30 %  SpO2: 93 %  Mask Type: Full face mask  Mask Size: Small  Additional Respiratory  Assessments  Pulse: 69  Resp: 20  SpO2: 93 %      EXAM:  General: No distress. Alert. Eyes: PERRL. No sclera icterus. No conjunctival injection. ENT: No discharge. Pharynx clear. Neck: Trachea midline. Normal thyroid. Resp: No accessory muscle use. Diminished throughout. No crackles. No wheezing. No rhonchi. CV: Regular rate. Regular rhythm. No mumur or rub. No BLE edema. ABD: Non-tender. Non-distended. No masses. No organmegaly. Normal bowel sounds. Skin: Warm and dry. No nodule on exposed extremities. No rash on exposed extremities. Lymph: No cervical LAD. No supraclavicular LAD. M/S: No cyanosis. No joint deformity. No clubbing. Neuro: Alert. Follows commands. I/O: I/O last 3 completed shifts: In: 154 [P.O.:120; I.V.:34]  Out: 3600 [Urine:3600]  No intake/output data recorded. Results:  CBC:   Recent Labs     07/17/21 0720 07/18/21 0618 07/19/21  0420   WBC 6.8 7.4 7.7   HGB 11.6 11.0* 12.0   HCT 39.6 37.0 39.7   MCV 99.5 97.4 97.3    267 267     BMP:   Recent Labs     07/17/21 0720 07/18/21 0618 07/19/21  0420    139 135   K 4.3 4.7 4.3   CL 97* 95* 90*   CO2 36* 36* 39*   BUN 20 25* 30*   CREATININE 1.1* 1.1* 1.2*       Cultures:  Results for Moi Scales (MRN [id number]) as of 7/18/2021 08:39   Ref.  Range 7/17/2021 13:00   Influenza A by PCR Latest Ref Range: Not Detected  Not Detected   Influenza B by PCR Latest Ref Range: Not Detected  Not Detected   Adenovirus by PCR Latest Ref Range: Not Detected  Not Detected   Coronavirus 229E by PCR Latest Ref Range: Not Detected  Not Detected   Coronavirus HKU1 by PCR Latest Ref Range: Not Detected  Not Detected   Coronavirus NL63 by PCR Latest Ref Range: Not Detected  Not Detected   Coronavirus OC43 by PCR Latest Ref Range: Not Detected  Not Detected   Human Metapneumovirus by PCR Latest Ref Range: Not Detected  Not Detected   Human Rhinovirus/Enterovirus by PCR Latest Ref Range: Not Detected  Not Detected   Parainfluenza Virus 1 by PCR Latest Ref Range: Not Detected  Not Detected   Parainfluenza Virus 2 by PCR Latest Ref Range: Not Detected  Not Detected   Parainfluenza Virus 3 by PCR Latest Ref Range: Not Detected  Not Detected   Parainfluenza Virus 4 by PCR Latest Ref ambulatory pulse ox prior to d/c if still on O2. · Continue AVAPS HS and prn - ABG with pCO2 55.9, improved. Will continue to follow. · BNP elevated with congestion on CXR - cardiology consulted and changed diuretic to IV BID. 6.7 L out since admit. On aldactone also. · Continue brovana and duonebs  · Continue steroids  · Sputum culture with mod gram + cocci in chains, spoke with micro await sensitivity. With cQT 547 no levaquin. Respiratory panel negative  · IS    PATRIZIA High - CNP      Attending Attestation Note:    Patient seen and examined with NP. I agree with above.     In addition, the following apply:    - prednisone with taper  - O2, IS  - procalcitonin 0.03, hold abx at this time  - sputum culture pending     Rosales Henson MD  7/19/2021  2:44 PM

## 2021-07-19 NOTE — PROGRESS NOTES
Physical Therapy    Facility/Department: [address] INTERNAL MEDICINE 2  Initial Assessment    NAME: Rolando Starkey  : 1955  MRN: [id number]    Date of Service: 2021      Patient Diagnosis(es): The primary encounter diagnosis was Acute respiratory failure with hypoxia ([address] Diagnoses of Acute on chronic congestive heart failure, unspecified heart failure type (HCC) and Hypertension, unspecified type were also pertinent to this visit. has a past medical history of Asthma, Atherosclerosis of native artery of left leg with rest pain ([address] C. difficile diarrhea, CAD (coronary artery disease), Cellulitis and abscess of trunk, Cerebellar infarct (HCC), Chronic back pain, Chronic kidney disease, Chronic systolic congestive heart failure ([address] Chronic venous insufficiency, COPD (chronic obstructive pulmonary disease) ([address] Depression, Diabetes mellitus ([address] Diabetic neuropathy ([address] Diverticulosis, Fatty liver, Glaucoma, open angle, Hepatic encephalopathy ([address] Hiatal hernia, History of blood transfusion, Hyperlipidemia, Hyperplastic colon polyp, Hypertension, Incontinence, Liver mass, Morbid obesity ([address] Movement disorder, Myocardial infarction ([address] Osteoarthritis, generalized, Pain in both lower legs, Peripheral vascular disease ([address] Pneumonia, Pulmonary edema, PVD (peripheral vascular disease) with claudication ([address] Sleep apnea, Status post peripheral artery angioplasty, Tobacco abuse, Tobacco abuse, Tubular adenoma polyp of rectum, Urinary tract infection due to ESBL Klebsiella, and Ventral hernia. has a past surgical history that includes knee surgery; Upper gastrointestinal endoscopy (12); Coronary artery bypass graft; layer wound closure (Left, [id number]); Echo Complete (10/9/2013); polysomnography (2016); liver biopsy (2016); bronchial brush biopsy (2013); Breast surgery (); Cholecystectomy (YRS AGO); Cardiac surgery (2011);  Cardiac catheterization (04/20/2015); Hysterectomy; joint replacement (2011); Upper gastrointestinal endoscopy (12/23/2016); Colonoscopy (11/15/2013); Colonoscopy (12/23/2016); Upper gastrointestinal endoscopy (02/08/2017); Endoscopy, colon, diagnostic (04/18/2017); Gallbladder surgery; and angioplasty (11/13/2018). Evaluating PT: Kendrick Mcintyre PT, DPT [id number]        Room #:  80/6237-B  Diagnosis:  Acute respiratory failure with hypoxia ([facility] [address] [J96.01]  PMHx/PSHx:  Diabetic neuropathy, chronic back pain, sleep apnea, C diff, HTN, DM, CAD, asthma, morbid obesity, OA, cellulitis, pulmonary edema, liver mass, ventral hernia, MI, glaucoma, COPD, hepatic encephalopathy, movement disorder, hiatal hernia, depression, CKD, CHF, PVD, cerebellar infarct  Precautions:  Fall risk, O2     SUBJECTIVE:     Pt lives alone in a single story house with 2 stair(s) to enter. Bed is on the first floor and bath is on the first floor. Pt ambulated with rollator walker prior to admission. Pt is on 3 L O2/min at home.     OBJECTIVE:    Initial Evaluation  Date: 7/16/21 Treatment Date:  7/19/21 Short Term/ Long Term   Goals   AM-PAC 6 Clicks 85/13 89/36      Was pt agreeable to Eval/treatment? Yes yes      Does pt have pain? Muscle spasm pain; RN aware       Bed Mobility  Rolling: NT  Supine to sit: Independent   Sit to supine: Independent   Scooting: Independent   NT NA   Transfers Sit to stand: Supervision  Stand to sit: Supervision  Stand pivot: SBA without AD  supervisoin Sit to stand: Independent   Stand to sit: Independent   Stand pivot: Independent    Ambulation   25, 50 feet with WW with SBA  35 feet and 50 feet 2 with ww  feet with [address] Mod Independent    Stair negotiation: ascended and descended NT   NT 2 step(s) with 1 rail(s) Mod Independent    ROM BUE: Refer to OT note  BLE: WFL       Strength BUE: Refer to OT Note  BLE: WFL       Balance Sitting EOB: Independent   Dynamic Standing: SBA with [address]   Dynamic Standing:  Mod Independent with [address]      Patient education  Pt was educated on safety with transfers    Patient response to education:   Pt verbalized understanding Pt demonstrated skill Pt requires further education in this area   yes yes       Additional Comments:  Pt requesting to attempt to ambulate without O2. SpO2 after ambulation on room air 83%. O2 reapplied at 1.5L and pt recovered to 91%. SpO2 after ambulation with O2 on 93%    Pt was left in chair with call light left by patient. Time in: 0835  Time out: 0850    Total treatment time 15 minutes  Pt is making good progress toward established Physical Therapy goals. Continue with physical therapy current plan of care.     Crystal PT [id number]      CPT codes:  [] Low Complexity PT evaluation 37233  [] Moderate Complexity PT evaluation 59144  [] High Complexity PT evaluation 49506  [] PT Re-evaluation 08346  [] Gait training 14822  minutes  [] Manual therapy 43773    minutes  [x] Therapeutic activities 60176 15   minutes  [] Therapeutic exercises 31670     minutes  [] Neuromuscular reeducation 59234     minutes

## 2021-07-19 NOTE — PROGRESS NOTES
Date: 7/18/21    Time: 2340    Patient Placed On BIPAP/CPAP/ Non-Invasive Ventilation? No    If no must comment. Facial area red/color change? If YES are Blister/Lesion present? If yes must notify nursing staff  BIPAP/CPAP skin barrier?   Yes    Skin barrier type:mepilexlite       Comments:    Red outlet already on  Unable to see under mepilex    Rizwana Olvera RCP

## 2021-07-19 NOTE — PROGRESS NOTES
Date: 7/18/2021    Time: 10:39 PM    Patient Placed On BIPAP/CPAP/ Non-Invasive Ventilation? Yes    If no must comment. Facial area red/color change? No           If YES are Blister/Lesion present? No   If yes must notify nursing staff  BIPAP/CPAP skin barrier?   Yes    Skin barrier type:mepilexlite       Comments:        Federico Cardenas RCP

## 2021-07-19 NOTE — PROGRESS NOTES
Progress Note  Date:2021       Room:[address] [place] [address]  Patient Beatrice Anderson     YOB: 1955     Age:65 y.o. Patient seen for follow up of Acute hypoxic respiratory failure. Patient sleeping this am on NIV. No acute events per nursing overnight. Subjective    Subjective:  Symptoms:  (Unable to obtain. ). Review of Systems  Objective         Vitals Last 24 Hours:  TEMPERATURE:  Temp  Av.5 °F (36.9 °C)  Min: 98 °F (36.7 °C)  Max: 99 °F (37.2 °C)  RESPIRATIONS RANGE: Resp  Av.6  Min: 18  Max: 23  PULSE OXIMETRY RANGE: SpO2  Av.5 %  Min: 93 %  Max: 96 %  PULSE RANGE: Pulse  Av  Min: 62  Max: 77  BLOOD PRESSURE RANGE: Systolic (13MWT), EZY:682 , Min:139 , GDP:114   ; Diastolic (98STX), SOL:65, Min:62, Max:85    I/O (24Hr): Intake/Output Summary (Last 24 hours) at 2021 0545  Last data filed at 2021 0532  Gross per 24 hour   Intake 130 ml   Output 3600 ml   Net -3470 ml     Objective:  General Appearance:  Comfortable, well-appearing and in no acute distress. Vital signs: (most recent): Blood pressure (!) 149/80, pulse 72, temperature 97.7 °F (36.5 °C), temperature source Oral, resp. rate 18, height 5' 1\" (1.549 m), weight 219 lb 6.4 oz (99.5 kg), last menstrual period 1990, SpO2 96 %, not currently breastfeeding. Lungs:  Normal effort and normal respiratory rate. There are decreased breath sounds. No rales or rhonchi. Heart: Normal rate. Regular rhythm. S1 normal and S2 normal.  No friction rub. Abdomen: Abdomen is soft and non-distended. Bowel sounds are normal.   There is no abdominal tenderness. There is no rebound tenderness. There is no guarding. Extremities: Normal range of motion. There is no dependent edema. Skin:  Warm and dry.       Labs/Imaging/Diagnostics    Labs:  CBC:  Recent Labs     21  0720 21  0618 21  0420   WBC 6.8 7.4 7.7   RBC 3.98 3.80 4.08   HGB 11.6 11.0* 12.0   HCT 39.6 37.0 39.7 MCV 99.5 97.4 97.3   RDW 14.2 14.1 13.7    267 267     CHEMISTRIES:  Recent Labs     07/17/21  0720 07/18/21 0618    139   K 4.3 4.7   CL 97* 95*   CO2 36* 36*   BUN 20 25*   CREATININE 1.1* 1.1*   GLUCOSE 190* 262*     PT/INR:No results for input(s): PROTIME, INR in the last 72 hours. APTT:No results for input(s): APTT in the last 72 hours. LIVER PROFILE:  Recent Labs     07/17/21  0720 07/18/21 0618   AST 6 6   ALT 9 9   BILITOT 0.3 0.3   ALKPHOS 77 75       Imaging Last 24 Hours:  No results found.   Assessment//Plan           Hospital Problems         Last Modified POA    * (Principal) Acute respiratory failure with hypoxia ([address] 7/16/2021 Yes    Chronic combined systolic and diastolic heart failure ([address] 7/16/2021 Yes    Overview Signed 10/4/2013  2:29 PM by Arnulfo Castillo MD     Cath Jan 2013-   EF 30%, global LV hypokinesis  plaque in distal left main, 50% LAD, circumflex  Mean pulmonary pressure 37         Coronary artery disease involving native coronary artery of native heart without angina pectoris (Chronic) 7/16/2021 Yes    Chronic respiratory failure with hypoxia and hypercapnia (HCC) (Chronic) 7/16/2021 Yes    DAYAN and COPD overlap syndrome (HCC) (Chronic) 7/16/2021 Yes    Overview Signed 10/4/2013 11:47 AM by Arnulfo Castillo MD     On bipap 22/18, f/u Dr Sarah Alcantara         DM2 (diabetes mellitus, type 2) ([address] (Chronic) 7/16/2021 Yes    Morbid obesity due to excess calories ([address] (Chronic) 7/16/2021 Yes    Cigarette smoker (Chronic) 7/16/2021 Yes    Hyperlipidemia (Chronic) 7/16/2021 Yes    Vitamin D insufficiency 7/16/2021 Yes    GERD (gastroesophageal reflux disease) (Chronic) 7/16/2021 Yes    Major depressive disorder, recurrent episode, mild ([address] 7/16/2021 Yes    Diabetic polyneuropathy associated with type 2 diabetes mellitus ([address] 7/16/2021 Yes    Chronic passive hepatic congestion 7/16/2021 Yes    Chronic back pain - d/t muscle spasm (Chronic) 7/16/2021 Yes    Overview Signed 1/28/2016

## 2021-07-20 ENCOUNTER — APPOINTMENT (OUTPATIENT)
Dept: GENERAL RADIOLOGY | Age: 66
DRG: 291 | End: 2021-07-20
Payer: MEDICARE

## 2021-07-20 LAB
ALBUMIN SERPL-MCNC: 3.5 G/DL (ref 3.5–5.2)
ALP BLD-CCNC: 97 U/L (ref 35–104)
ALT SERPL-CCNC: 15 U/L (ref 0–32)
ANION GAP SERPL CALCULATED.3IONS-SCNC: 3 MMOL/L (ref 7–16)
AST SERPL-CCNC: 9 U/L (ref 0–31)
BASOPHILS ABSOLUTE: 0.01 E9/L (ref 0–0.2)
BASOPHILS RELATIVE PERCENT: 0.1 % (ref 0–2)
BILIRUB SERPL-MCNC: 0.3 MG/DL (ref 0–1.2)
BUN BLDV-MCNC: 32 MG/DL (ref 6–23)
CALCIUM SERPL-MCNC: 9.4 MG/DL (ref 8.6–10.2)
CHLORIDE BLD-SCNC: 89 MMOL/L (ref 98–107)
CO2: 40 MMOL/L (ref 22–29)
CREAT SERPL-MCNC: 1.2 MG/DL (ref 0.5–1)
CULTURE, RESPIRATORY: NORMAL
EOSINOPHILS ABSOLUTE: 0.01 E9/L (ref 0.05–0.5)
EOSINOPHILS RELATIVE PERCENT: 0.1 % (ref 0–6)
GFR AFRICAN AMERICAN: 54
GFR NON-AFRICAN AMERICAN: 54 ML/MIN/1.73
GLUCOSE BLD-MCNC: 223 MG/DL (ref 74–99)
HCT VFR BLD CALC: 40.1 % (ref 34–48)
HEMOGLOBIN: 12.2 G/DL (ref 11.5–15.5)
IMMATURE GRANULOCYTES #: 0.03 E9/L
IMMATURE GRANULOCYTES %: 0.4 % (ref 0–5)
LYMPHOCYTES ABSOLUTE: 1.07 E9/L (ref 1.5–4)
LYMPHOCYTES RELATIVE PERCENT: 13 % (ref 20–42)
MCH RBC QN AUTO: 29.2 PG (ref 26–35)
MCHC RBC AUTO-ENTMCNC: 30.4 % (ref 32–34.5)
MCV RBC AUTO: 95.9 FL (ref 80–99.9)
METER GLUCOSE: 144 MG/DL (ref 74–99)
METER GLUCOSE: 150 MG/DL (ref 74–99)
METER GLUCOSE: 259 MG/DL (ref 74–99)
METER GLUCOSE: 417 MG/DL (ref 74–99)
MONOCYTES ABSOLUTE: 0.66 E9/L (ref 0.1–0.95)
MONOCYTES RELATIVE PERCENT: 8 % (ref 2–12)
NEUTROPHILS ABSOLUTE: 6.46 E9/L (ref 1.8–7.3)
NEUTROPHILS RELATIVE PERCENT: 78.4 % (ref 43–80)
PDW BLD-RTO: 13.7 FL (ref 11.5–15)
PLATELET # BLD: 287 E9/L (ref 130–450)
PMV BLD AUTO: 10.7 FL (ref 7–12)
POTASSIUM REFLEX MAGNESIUM: 4.1 MMOL/L (ref 3.5–5)
RBC # BLD: 4.18 E12/L (ref 3.5–5.5)
SMEAR, RESPIRATORY: NORMAL
SODIUM BLD-SCNC: 132 MMOL/L (ref 132–146)
TOTAL PROTEIN: 6.6 G/DL (ref 6.4–8.3)
WBC # BLD: 8.2 E9/L (ref 4.5–11.5)

## 2021-07-20 PROCEDURE — 85025 COMPLETE CBC W/AUTO DIFF WBC: CPT

## 2021-07-20 PROCEDURE — 97530 THERAPEUTIC ACTIVITIES: CPT

## 2021-07-20 PROCEDURE — 2700000000 HC OXYGEN THERAPY PER DAY

## 2021-07-20 PROCEDURE — 70360 X-RAY EXAM OF NECK: CPT

## 2021-07-20 PROCEDURE — 2500000003 HC RX 250 WO HCPCS: Performed by: INTERNAL MEDICINE

## 2021-07-20 PROCEDURE — 6370000000 HC RX 637 (ALT 250 FOR IP): Performed by: INTERNAL MEDICINE

## 2021-07-20 PROCEDURE — 36415 COLL VENOUS BLD VENIPUNCTURE: CPT

## 2021-07-20 PROCEDURE — 2580000003 HC RX 258: Performed by: INTERNAL MEDICINE

## 2021-07-20 PROCEDURE — 82962 GLUCOSE BLOOD TEST: CPT

## 2021-07-20 PROCEDURE — 99233 SBSQ HOSP IP/OBS HIGH 50: CPT | Performed by: INTERNAL MEDICINE

## 2021-07-20 PROCEDURE — 6370000000 HC RX 637 (ALT 250 FOR IP): Performed by: NURSE PRACTITIONER

## 2021-07-20 PROCEDURE — 94660 CPAP INITIATION&MGMT: CPT

## 2021-07-20 PROCEDURE — 94640 AIRWAY INHALATION TREATMENT: CPT

## 2021-07-20 PROCEDURE — 6360000002 HC RX W HCPCS: Performed by: INTERNAL MEDICINE

## 2021-07-20 PROCEDURE — 2060000000 HC ICU INTERMEDIATE R&B

## 2021-07-20 PROCEDURE — 73560 X-RAY EXAM OF KNEE 1 OR 2: CPT

## 2021-07-20 PROCEDURE — 80053 COMPREHEN METABOLIC PANEL: CPT

## 2021-07-20 RX ORDER — LIDOCAINE 4 G/G
1 PATCH TOPICAL DAILY
Status: DISCONTINUED | OUTPATIENT
Start: 2021-07-20 | End: 2021-07-21 | Stop reason: HOSPADM

## 2021-07-20 RX ORDER — BUMETANIDE 1 MG/1
2 TABLET ORAL DAILY
Status: DISCONTINUED | OUTPATIENT
Start: 2021-07-21 | End: 2021-07-21 | Stop reason: HOSPADM

## 2021-07-20 RX ADMIN — MONTELUKAST SODIUM 10 MG: 10 TABLET, FILM COATED ORAL at 20:24

## 2021-07-20 RX ADMIN — NYSTATIN 500000 UNITS: 500000 SUSPENSION ORAL at 20:26

## 2021-07-20 RX ADMIN — NYSTATIN 500000 UNITS: 500000 SUSPENSION ORAL at 09:55

## 2021-07-20 RX ADMIN — METOCLOPRAMIDE 5 MG: 5 TABLET ORAL at 06:55

## 2021-07-20 RX ADMIN — TROSPIUM CHLORIDE 20 MG: 20 TABLET, FILM COATED ORAL at 06:55

## 2021-07-20 RX ADMIN — HYDRALAZINE HYDROCHLORIDE 75 MG: 50 TABLET, FILM COATED ORAL at 13:32

## 2021-07-20 RX ADMIN — DULOXETINE HYDROCHLORIDE 60 MG: 60 CAPSULE, DELAYED RELEASE ORAL at 09:57

## 2021-07-20 RX ADMIN — INSULIN LISPRO 6 UNITS: 100 INJECTION, SOLUTION INTRAVENOUS; SUBCUTANEOUS at 20:25

## 2021-07-20 RX ADMIN — NYSTATIN 500000 UNITS: 500000 SUSPENSION ORAL at 13:30

## 2021-07-20 RX ADMIN — GABAPENTIN 100 MG: 100 CAPSULE ORAL at 20:24

## 2021-07-20 RX ADMIN — CARVEDILOL 25 MG: 25 TABLET, FILM COATED ORAL at 16:43

## 2021-07-20 RX ADMIN — BUMETANIDE 1 MG: 0.25 INJECTION, SOLUTION INTRAMUSCULAR; INTRAVENOUS at 09:56

## 2021-07-20 RX ADMIN — TRAZODONE HYDROCHLORIDE 100 MG: 50 TABLET ORAL at 20:24

## 2021-07-20 RX ADMIN — TROSPIUM CHLORIDE 20 MG: 20 TABLET, FILM COATED ORAL at 16:35

## 2021-07-20 RX ADMIN — SPIRONOLACTONE 25 MG: 25 TABLET ORAL at 09:56

## 2021-07-20 RX ADMIN — METOCLOPRAMIDE 5 MG: 5 TABLET ORAL at 16:35

## 2021-07-20 RX ADMIN — NYSTATIN 500000 UNITS: 500000 SUSPENSION ORAL at 16:36

## 2021-07-20 RX ADMIN — IPRATROPIUM BROMIDE AND ALBUTEROL SULFATE 1 AMPULE: 2.5; .5 SOLUTION RESPIRATORY (INHALATION) at 13:03

## 2021-07-20 RX ADMIN — PREDNISONE 40 MG: 20 TABLET ORAL at 09:56

## 2021-07-20 RX ADMIN — CARVEDILOL 25 MG: 25 TABLET, FILM COATED ORAL at 09:57

## 2021-07-20 RX ADMIN — TRAMADOL HYDROCHLORIDE 50 MG: 50 TABLET, FILM COATED ORAL at 09:55

## 2021-07-20 RX ADMIN — IPRATROPIUM BROMIDE AND ALBUTEROL SULFATE 1 AMPULE: 2.5; .5 SOLUTION RESPIRATORY (INHALATION) at 20:39

## 2021-07-20 RX ADMIN — HYDRALAZINE HYDROCHLORIDE 75 MG: 50 TABLET, FILM COATED ORAL at 20:24

## 2021-07-20 RX ADMIN — INSULIN LISPRO 6 UNITS: 100 INJECTION, SOLUTION INTRAVENOUS; SUBCUTANEOUS at 16:35

## 2021-07-20 RX ADMIN — IPRATROPIUM BROMIDE AND ALBUTEROL SULFATE 1 AMPULE: 2.5; .5 SOLUTION RESPIRATORY (INHALATION) at 09:38

## 2021-07-20 RX ADMIN — ASPIRIN 81 MG: 81 TABLET, CHEWABLE ORAL at 09:57

## 2021-07-20 RX ADMIN — GABAPENTIN 100 MG: 100 CAPSULE ORAL at 09:56

## 2021-07-20 RX ADMIN — ARFORMOTEROL TARTRATE 15 MCG: 15 SOLUTION RESPIRATORY (INHALATION) at 20:39

## 2021-07-20 RX ADMIN — ENOXAPARIN SODIUM 40 MG: 40 INJECTION SUBCUTANEOUS at 09:55

## 2021-07-20 RX ADMIN — ARFORMOTEROL TARTRATE 15 MCG: 15 SOLUTION RESPIRATORY (INHALATION) at 09:38

## 2021-07-20 RX ADMIN — MICONAZOLE NITRATE: 20 POWDER TOPICAL at 10:16

## 2021-07-20 RX ADMIN — GUAIFENESIN 400 MG: 400 TABLET ORAL at 09:57

## 2021-07-20 RX ADMIN — GUAIFENESIN 400 MG: 400 TABLET ORAL at 20:35

## 2021-07-20 RX ADMIN — METOCLOPRAMIDE 5 MG: 5 TABLET ORAL at 11:18

## 2021-07-20 RX ADMIN — DOCUSATE SODIUM 100 MG: 100 CAPSULE, LIQUID FILLED ORAL at 20:24

## 2021-07-20 RX ADMIN — POLYETHYLENE GLYCOL 3350 17 G: 17 POWDER, FOR SOLUTION ORAL at 09:55

## 2021-07-20 RX ADMIN — Medication 10 ML: at 09:58

## 2021-07-20 RX ADMIN — IPRATROPIUM BROMIDE AND ALBUTEROL SULFATE 1 AMPULE: 2.5; .5 SOLUTION RESPIRATORY (INHALATION) at 16:07

## 2021-07-20 RX ADMIN — ISOSORBIDE MONONITRATE 60 MG: 60 TABLET, EXTENDED RELEASE ORAL at 09:59

## 2021-07-20 RX ADMIN — TRAMADOL HYDROCHLORIDE 50 MG: 50 TABLET, FILM COATED ORAL at 20:35

## 2021-07-20 RX ADMIN — SACUBITRIL AND VALSARTAN 1 TABLET: 49; 51 TABLET, FILM COATED ORAL at 09:57

## 2021-07-20 RX ADMIN — HYDRALAZINE HYDROCHLORIDE 50 MG: 50 TABLET, FILM COATED ORAL at 06:55

## 2021-07-20 RX ADMIN — Medication 10 ML: at 21:56

## 2021-07-20 RX ADMIN — DULOXETINE HYDROCHLORIDE 60 MG: 60 CAPSULE, DELAYED RELEASE ORAL at 20:24

## 2021-07-20 RX ADMIN — MICONAZOLE NITRATE: 20 POWDER TOPICAL at 21:55

## 2021-07-20 RX ADMIN — STANDARDIZED SENNA CONCENTRATE 8.6 MG: 8.6 TABLET ORAL at 16:46

## 2021-07-20 RX ADMIN — SACUBITRIL AND VALSARTAN 1 TABLET: 49; 51 TABLET, FILM COATED ORAL at 20:24

## 2021-07-20 RX ADMIN — INSULIN LISPRO 2 UNITS: 100 INJECTION, SOLUTION INTRAVENOUS; SUBCUTANEOUS at 06:55

## 2021-07-20 RX ADMIN — Medication 1000 UNITS: at 09:57

## 2021-07-20 RX ADMIN — INSULIN LISPRO 2 UNITS: 100 INJECTION, SOLUTION INTRAVENOUS; SUBCUTANEOUS at 11:18

## 2021-07-20 RX ADMIN — INSULIN GLARGINE 40 UNITS: 100 INJECTION, SOLUTION SUBCUTANEOUS at 20:25

## 2021-07-20 ASSESSMENT — PAIN DESCRIPTION - ONSET: ONSET: GRADUAL

## 2021-07-20 ASSESSMENT — PAIN SCALES - GENERAL
PAINLEVEL_OUTOF10: 0
PAINLEVEL_OUTOF10: 10
PAINLEVEL_OUTOF10: 2
PAINLEVEL_OUTOF10: 9
PAINLEVEL_OUTOF10: 10
PAINLEVEL_OUTOF10: 0
PAINLEVEL_OUTOF10: 0

## 2021-07-20 ASSESSMENT — ENCOUNTER SYMPTOMS
NAUSEA: 0
DIARRHEA: 0
VOMITING: 0
SHORTNESS OF BREATH: 1
COUGH: 0

## 2021-07-20 ASSESSMENT — PAIN DESCRIPTION - PAIN TYPE: TYPE: ACUTE PAIN

## 2021-07-20 ASSESSMENT — PAIN DESCRIPTION - ORIENTATION: ORIENTATION: RIGHT

## 2021-07-20 ASSESSMENT — PAIN DESCRIPTION - FREQUENCY: FREQUENCY: CONTINUOUS

## 2021-07-20 ASSESSMENT — PAIN DESCRIPTION - LOCATION: LOCATION: FACE;NECK

## 2021-07-20 ASSESSMENT — PAIN DESCRIPTION - DESCRIPTORS: DESCRIPTORS: ACHING;DISCOMFORT

## 2021-07-20 NOTE — PROGRESS NOTES
Occupational Therapy  OT BEDSIDE TREATMENT NOTE      Date:2021  Patient Name: Leopoldo Halter  MRN: [id number]  : 1955  Room: [address] [place] [address]     Evaluating OT: Ulysses Sparks, OTR/L [id number]       Referring Provider:Marvin Puri DO    Specific Provider Orders/Date: OT eval and treat 7-       Diagnosis: Acuter respiratory failure with hypoxia     Surgery:   Past Surgical History         Past Surgical History:   Procedure Laterality Date    ANGIOPLASTY   2018     Dr. Ly Hager & athrectomy L SFA & Popliteal    BREAST SURGERY        bilateral reduction    BRONCHIAL BRUSH BIOPSY   2013     Dr Gunjan Enriquez   2015     Dr. Shailesh Mejia   2011      DR. Carla Aguilar,  follows Dr Anali Sosa   11/15/2013     Dr Elias Garg COLONOSCOPY   2016     Dr Mariann Hall adenoma & hyperplastic polyps, melanosis coli (repeat one year 2017)    CORONARY ARTERY BYPASS GRAFT        ECHO COMPLETE   10/9/2013          ENDOSCOPY, COLON, DIAGNOSTIC   2017    GALLBLADDER SURGERY         gallstones removed, [facility] 2017    HYSTERECTOMY        JOINT REPLACEMENT        LEFT KNEE    KNEE SURGERY         left knee replacement    LAYER WOUND CLOSURE Left [id number]    LIVER BIOPSY   2016      [facility]    POLYSOMNOGRAPHY   2016     [facility]    UPPER GASTROINTESTINAL ENDOSCOPY   12    UPPER GASTROINTESTINAL ENDOSCOPY   2016     Dr Elias Garg UPPER GASTROINTESTINAL ENDOSCOPY   2017           Pertinent Medical History: CAD, Cellulitis, COPD, OA, CHF      Past Medical History        Past Medical History:   Diagnosis Date    Asthma      Atherosclerosis of native artery of left leg with rest pain ([address] 2018    C. difficile diarrhea     CAD (coronary artery disease)     Cellulitis and abscess of trunk 2015    Cerebellar infarct ([address] 4/3/2019     Remote, rt. cerebellum, head CT scan, 1/9/19    Chronic back pain      Chronic kidney disease      Chronic systolic congestive heart failure ([address] 10/4/2013    Chronic venous insufficiency 10/11/2017    COPD (chronic obstructive pulmonary disease) (HCC)      Depression      Diabetes mellitus ([address]      Diabetic neuropathy (HCC)      Diverticulosis      Fatty liver 1/8/2016     per [facility]    Glaucoma, open angle 2/1/2016     Mild-OU    Hepatic encephalopathy ([address] 02/07/2016     resolved    Hiatal hernia      History of blood transfusion      Hyperlipidemia      Hyperplastic colon polyp      Hypertension      Incontinence      Liver mass      Morbid obesity (HCC)      Movement disorder      Myocardial infarction ([address] 2011    Osteoarthritis, generalized      Pain in both lower legs 10/11/2017    Peripheral vascular disease ([address]      Pneumonia 1/26/2014    Pulmonary edema       resolved    PVD (peripheral vascular disease) with claudication ([address] 8/30/2017    Sleep apnea       uses BI PAP    Status post peripheral artery angioplasty 10/31/2019    Tobacco abuse      Tobacco abuse 10/11/2017    Tubular adenoma polyp of rectum      Urinary tract infection due to ESBL Klebsiella 03/08/2017    Ventral hernia              Past Surgical History         Past Surgical History:   Procedure Laterality Date    ANGIOPLASTY   11/13/2018     Dr. Montes De Oca Safe & athrectomy L SFA & Popliteal    BREAST SURGERY   2000     bilateral reduction    BRONCHIAL BRUSH BIOPSY   1/25/2013     Dr Abilio Cline   04/20/2015     Dr. Ok Izaguirre   12/2011      DR. Yusuf Abraham,  follows Dr Sruthi Soliz   11/15/2013     Dr Rachel Diane COLONOSCOPY   12/23/2016     Dr Medley Matters adenoma & hyperplastic polyps, melanosis coli (repeat one year 12/2017)    CORONARY ARTERY BYPASS GRAFT        ECHO COMPLETE   10/9/2013          ENDOSCOPY, COLON, DIAGNOSTIC   04/18/2017    GALLBLADDER SURGERY         gallstones removed, [facility] 8/2017    HYSTERECTOMY        JOINT REPLACEMENT   2011     LEFT KNEE    KNEE SURGERY         left knee replacement    LAYER WOUND CLOSURE Left [id number]    LIVER BIOPSY   1/8/2016     [facility]    POLYSOMNOGRAPHY   1/2016     [facility]    UPPER GASTROINTESTINAL ENDOSCOPY   12/20/12    UPPER GASTROINTESTINAL ENDOSCOPY   12/23/2016     Dr Anabel Gore UPPER GASTROINTESTINAL ENDOSCOPY   02/08/2017          Precautions:  Fall Risk, Bed alarm,        Assessment of current deficits    []? Functional mobility            [x]?ADLs           [x]? Strength                  []?Cognition    [x]? Functional transfers          [x]? IADLs         [x]? Safety Awareness   [x]? Endurance    []? Fine Coordination                         [x]? Balance      []? Vision/perception   []? Sensation      []? Gross Motor Coordination             []? ROM           []?  Delirium                   []? Motor Control      OT PLAN OF CARE   OT POC based on physician orders, patient diagnosis and results of clinical assessment     Frequency/Duration 2-5 days/wk for 2 weeks PRN   Specific OT Treatment Interventions to include:   * Instruction/training on adapted ADL techniques and AE recommendations to increase functional independence within precautions       * Training on energy conservation strategies, correct breathing pattern and techniques to improve independence/tolerance for self-care routine  * Functional transfer/mobility training/DME recommendations for increased independence, safety, and fall prevention  * Patient/Family education to increase follow through with safety techniques and functional independence  * Recommendation of environmental modifications for increased safety with functional transfers/mobility and ADLs  * Therapeutic exercise to improve motor endurance, ROM, and functional strength for ADLs/functional transfers  * Therapeutic activities to facilitate/challenge dynamic balance, stand tolerance for increased safety and independence with ADLs  * Therapeutic activities to facilitate gross/fine motor skills for increased independence with ADLs     Recommended Adaptive Equipment: None at present time      Home Living: Pt lives alone in a 1 story home with 2 steps with 1  Rails. Pt has aides 7 days /week for 5 hours who assist with LB dressing, bathing, and homemaking    Bathroom setup: walk in shower with a seat, hi-rise toilet   Equipment owned: [address], shower seat, Dressing equipment     Prior Level of Function: Assist with ADLs , Assist with IADLs; ambulated with Rollator  Driving: no  Occupation: not working      Pain Level: No c/o pain  Cognition: A&O: 4/4                Functional Assessment:  AM-PAC Daily Activity Raw Score: 18/24    Initial Eval Status  Date: 7/16/21 Treatment Status  Date:7/20/21 STGs = LTGs  Time frame: 10-14 days   Feeding Independent        Grooming Contact Guard Assist   At sink with FWW to wash hands       Moderate Tyler    UB Dressing Minimal Assist with gown managemnent   Independent    LB Dressing Moderate Assist   Dep socks    Minimal Assist    Bathing Moderate Assist   Minimal Assist    Toileting Contact Guard Assist    pt I with cat care   Supervision Moderate Tyler    Bed Mobility  Supine to sit: Independent   Sit to supine: Independent    Supine to sit: Independent   Sit to supine: Independent    Functional Transfers Stand by Assist with FWW STS: Supervision from chair  Moderate Tyler    Functional Mobility Stand by Assist   Pt required cues for direction as she frequently bumped into furniture on R side.  Pt reports her vision is poor due to glaucoma Supervision without AD in room  Moderate Tyler    Balance Sitting:     Static:  good    Dynamic:good-  Standing: SBA with FWW Sitting: Independent  Standing: SUpervision      Activity Tolerance Fair for light activity with 4L O2  At rest O2 sat at 93  After activity O2 sat rate at 93 Fair+  Good to participate in self care tasks   Visual/  Perceptual Glasses: yes                           Treatment: Upon arrival pt was sitting in chair. Instructed pt on 2x10 reps of wall push ups to improve strength during ADLs. SpO2 92% and above with activity. At end of session alarm on, all lines and tubes intact and call light within reach. Education: Energy conservation techniques to improve tolerance to ADLs. · Pt has made good progress towards set goals.        Treatment Charges: Mins Units   Ther Ex  [id number]     Manual Therapy [id number]     Thera Activities 84893 10 1   ADL/Home Mgt [id number] 5 0   Neuro Re-ed 13792     Group Therapy      Orthotic manage/training  [id number]     Non-Billable Time         Total Time: [address] [id number]/L [id number]

## 2021-07-20 NOTE — CARE COORDINATION
7/20/2021  Social Work Discharge Planning:Possible discharge today. Pt is on 3l o2 here and this is her baseline at home via [facility]. Plan is home alone and VIRGIE with [address]. Order is in. Pt also has an aide who assists 7days/week.  Electronically signed by SINDHU Espinoza on 7/20/2021 at 10:01 AM

## 2021-07-20 NOTE — PLAN OF CARE
Problem: Skin Integrity:  Goal: Will show no infection signs and symptoms  Description: Will show no infection signs and symptoms  7/20/2021 1552 by Noemi Burgos RN  Outcome: Met This Shift  7/20/2021 0330 by Jonel Sandy RN  Outcome: Met This Shift  Goal: Absence of new skin breakdown  Description: Absence of new skin breakdown  7/20/2021 1552 by Noemi Burgos RN  Outcome: Met This Shift  7/20/2021 0330 by Jonel Sandy RN  Outcome: Met This Shift

## 2021-07-20 NOTE — PROGRESS NOTES
Progress Note  Date:2021       Room:[facility]  Patient Beatrice Anderson     YOB: 1955     Age:65 y.o. Patient seen for follow up of Acute hypoxic respiratory failure. Patient sleeping this am on NIV. Patient this am states she has a lump in her throat. She denies any fevers, chills, nausea, vomiting, chest pains, or increased shortness of breath. Having some pain at artificial knee but denies any recent falls or trauma to the area. Subjective    Subjective:  Symptoms:  Improved. She reports shortness of breath. No cough, chest pain, headache, chest pressure or diarrhea. (Unable to obtain. ). Diet:  No nausea or vomiting. Activity level: Impaired due to pain. Review of Systems   Respiratory: Positive for shortness of breath. Negative for cough. Cardiovascular: Negative for chest pain. Gastrointestinal: Negative for diarrhea, nausea and vomiting. Objective         Vitals Last 24 Hours:  TEMPERATURE:  Temp  Av.9 °F (36.6 °C)  Min: 96.9 °F (36.1 °C)  Max: 98.5 °F (36.9 °C)  RESPIRATIONS RANGE: Resp  Av  Min: 16  Max: 27  PULSE OXIMETRY RANGE: SpO2  Av.3 %  Min: 93 %  Max: 97 %  PULSE RANGE: Pulse  Av.6  Min: 67  Max: 73  BLOOD PRESSURE RANGE: Systolic (65ATL), LRT:367 , Min:130 , VGJ:875   ; Diastolic (72ETS), YDF:91, Min:60, Max:94    I/O (24Hr): Intake/Output Summary (Last 24 hours) at 2021 0542  Last data filed at 2021 1717  Gross per 24 hour   Intake 220 ml   Output 1850 ml   Net -1630 ml     Objective:  General Appearance:  Comfortable, well-appearing and in no acute distress. Vital signs: (most recent): Blood pressure 133/69, pulse 67, temperature 96.9 °F (36.1 °C), temperature source Axillary, resp. rate 16, height 5' 1\" (1.549 m), weight 232 lb 1.6 oz (105.3 kg), last menstrual period 1990, SpO2 97 %, not currently breastfeeding. Lungs:  Normal effort and normal respiratory rate.   There are decreased breath sounds. No rales or rhonchi. Heart: Normal rate. Regular rhythm. S1 normal and S2 normal.  No friction rub. Abdomen: Abdomen is soft and non-distended. Bowel sounds are normal.   There is no abdominal tenderness. There is no rebound tenderness. There is no guarding. Extremities: Normal range of motion. There is no dependent edema. Skin:  Warm and dry. Labs/Imaging/Diagnostics    Labs:  CBC:  Recent Labs     07/18/21 0618 07/19/21  0420 07/20/21  0315   WBC 7.4 7.7 8.2   RBC 3.80 4.08 4.18   HGB 11.0* 12.0 12.2   HCT 37.0 39.7 40.1   MCV 97.4 97.3 95.9   RDW 14.1 13.7 13.7    267 287     CHEMISTRIES:  Recent Labs     07/18/21 0618 07/19/21 0420 07/20/21  0315    135 132   K 4.7 4.3 4.1   CL 95* 90* 89*   CO2 36* 39* 40*   BUN 25* 30* 32*   CREATININE 1.1* 1.2* 1.2*   GLUCOSE 262* 239* 223*     PT/INR:No results for input(s): PROTIME, INR in the last 72 hours. APTT:No results for input(s): APTT in the last 72 hours. LIVER PROFILE:  Recent Labs     07/18/21 0618 07/19/21 0420 07/20/21  0315   AST 6 10 9   ALT 9 13 15   BILITOT 0.3 0.3 0.3   ALKPHOS 75 90 97       Imaging Last 24 Hours:  No results found.   Assessment//Plan           Hospital Problems         Last Modified POA    * (Principal) Acute respiratory failure with hypoxia (HCC) 7/16/2021 Yes    Chronic combined systolic and diastolic heart failure ([address] 7/16/2021 Yes    Overview Signed 10/4/2013  2:29 PM by Sal Cochran MD     Cath Jan 2013-   EF 30%, global LV hypokinesis  plaque in distal left main, 50% LAD, circumflex  Mean pulmonary pressure 37         Coronary artery disease involving native coronary artery of native heart without angina pectoris (Chronic) 7/16/2021 Yes    Chronic respiratory failure with hypoxia and hypercapnia (HCC) (Chronic) 7/16/2021 Yes    DAYAN and COPD overlap syndrome ([address] (Chronic) 7/16/2021 Yes    Overview Signed 10/4/2013 11:47 AM by Sal Cochran MD     On bipap 22/18, f/u Dr Landen Craig DM2 (diabetes mellitus, type 2) ([address] (Chronic) 7/16/2021 Yes    Morbid obesity due to excess calories ([address] (Chronic) 7/16/2021 Yes    Cigarette smoker (Chronic) 7/16/2021 Yes    Hyperlipidemia (Chronic) 7/16/2021 Yes    Vitamin D insufficiency 7/16/2021 Yes    GERD (gastroesophageal reflux disease) (Chronic) 7/16/2021 Yes    Major depressive disorder, recurrent episode, mild ([address] 7/16/2021 Yes    Diabetic polyneuropathy associated with type 2 diabetes mellitus ([address] 7/16/2021 Yes    Chronic passive hepatic congestion 7/16/2021 Yes    Chronic back pain - d/t muscle spasm (Chronic) 7/16/2021 Yes    Overview Signed 1/28/2016  1:45 PM by Severo Watson, RN     was d/c from PM for positive THC in UDS         Glaucoma, open angle 7/16/2021 Yes    Overview Signed 2/1/2016  5:19 PM by Severo Watson RN     Mild-OU         Lumbar stenosis 7/16/2021 Yes    Spondylosis 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 7/16/2021 Yes    Non-compliance 7/16/2021 Yes    Hiatal hernia 7/16/2021 Yes    Overview Signed 12/29/2016  2:04 PM by Severo Watson RN     Per EGD 12/23/16 Dr Franklin Coleman         Ischemic cardiomyopathy 7/16/2021 Yes    PVD (peripheral vascular disease) with claudication ([address] 7/16/2021 Yes    Chronic venous insufficiency 7/16/2021 Yes    History of non-ST elevation myocardial infarction (NSTEMI) 7/16/2021 Yes    History of stroke 7/16/2021 Yes    Overview Signed 4/3/2019  2:42 PM by Zahraa Shabazz MD     Remote, rt. cerebellum, head CT scan, 1/9/19         COPD (chronic obstructive pulmonary disease) ([address] 7/16/2021 Yes    Status post peripheral artery angioplasty 7/16/2021 Yes    Uncontrolled hypertension 7/16/2021 Yes    Acute on chronic congestive heart failure ([address] 7/16/2021 Yes        Assessment & Plan  1. Acute hypoxic respiratory failure  2. Acute COPD exacerbation  3. DAYAN  4. Tobacco Abuse  5. CAD  6. Acute on chronic HFpEF    Appreciate pulmonary and cardiology input.  Continue nystatin for oral thrush. Wean oxygen. Check Xray of neck as well as knee.       Idalia Justin, DO

## 2021-07-20 NOTE — PROGRESS NOTES
INPATIENT CARDIOLOGY FOLLOW-UP    Name: Jenni Marrero    Age: 76 y.o. Date of Admission: 7/15/2021  2:20 PM    Date of Service: 7/20/2021    Chief Complaint: Follow-up for acute superimposed upon chronic diastolic heart failure, acute hypoxic respiratory failure superimposed upon chronic combined hypoxic and hypercapnic respiratory failure, chronic obstructive lung disease, morbid obesity, obstructive sleep apnea    Interim History: The patient remains compensated from a cardiovascular standpoint with ongoing fluid mobilization and present complaints limited to that of discomfort of her neck. Most recent pulmonary assessment and recommendations have been reviewed. Review of Systems: The remainder of a complete multisystem review including consitutional, central nervous, respiratory, circulatory, gastrointestinal, genitourinary, endocrinologic, hematologic, musculoskeletal and psychiatric are negative.     Problem List:  Patient Active Problem List   Diagnosis    Morbid obesity due to excess calories (HCC)    Hyperlipidemia    DAYAN and COPD overlap syndrome (HCC)    Vitamin D insufficiency    Chronic combined systolic and diastolic heart failure (HCC)    GERD (gastroesophageal reflux disease)    Major depressive disorder, recurrent episode, mild ([address]    Diabetic polyneuropathy associated with type 2 diabetes mellitus (HCC)    Chronic passive hepatic congestion    Mixed incontinence urge and stress    Chronic back pain - d/t muscle spasm    Glaucoma, open angle    Elevated CA 19-9 level    Lumbar stenosis    Spondylosis 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    Lumbar disc herniation    Asymmetric septal hypertrophy (HCC)    Non-compliance    Hiatal hernia    Melanosis coli    Coronary artery disease involving native coronary artery of native heart without angina pectoris    DM2 (diabetes mellitus, type 2) ([address]    Cigarette smoker    Marijuana use, smoked    Ischemic cardiomyopathy    PVD (peripheral vascular disease) with claudication (HCC)    Chronic venous insufficiency    History of non-ST elevation myocardial infarction (NSTEMI)    QT prolongation    History of stroke    Chronic respiratory failure with hypoxia and hypercapnia (HCC)    Acute respiratory failure with hypoxia (HCC)    [facility] obstructive [facility] disease) [facility]    Status post peripheral artery angioplasty    Uncontrolled hypertension    Acute on chronic congestive heart failure ([facility])       Allergies:   Allergies   Allergen Reactions    Latex Hives    Bee Venom Anaphylaxis    Dilaudid [Hydromorphone Hcl] Itching    Dye [Iodides] Hives and Shortness Of Breath    Percocet [Oxycodone-Acetaminophen] Shortness Of Breath and Itching    Keflex [Cephalexin] Itching and Rash    Lasix [Furosemide] Other (See Comments)     Pt states she cramps up and gets headaches    Levaquin [Levofloxacin In D5w] Hives    Lipitor      MUSCLE SPASMS    Lyrica [Pregabalin]      Dream disturbances    Morphine Hives    Naproxen      Unsure of reaction;pt states able to take Aleve without difficulties    Nefazodone Other (See Comments)    Norvasc [Amlodipine] Swelling    Oxycodone-Acetaminophen Swelling    Shellfish-Derived Products     Trazodone And Nefazodone        Current Medications:  Current Facility-Administered Medications   Medication Dose Route Frequency Provider Last Rate Last Admin    lidocaine 4 % external patch 1 patch  1 patch Transdermal Daily Tana Justin,         polyethylene glycol (GLYCOLAX) packet 17 g  17 g Oral Daily Marvin Del Angel DO   17 g at 07/19/21 0916    nystatin (MYCOSTATIN) 640749 UNIT/ML suspension 500,000 Units  5 mL Oral 4x Daily Tana Justin DO   500,000 Units at 07/19/21 2044    predniSONE (DELTASONE) tablet 40 mg  40 mg Oral Daily with breakfast Lum PATRIZIA Floyd CNP   40 mg at 07/19/21 0919    miconazole (MICOTIN) 2 % powder   Topical BID Maite Sosa MD   Given at 07/19/21 2051    ipratropium-albuterol (DUONEB) nebulizer solution 1 ampule  1 ampule Inhalation Q4H WA Manuela Escoto MD   1 ampule at 07/19/21 2017    Arformoterol Tartrate (BROVANA) nebulizer solution 15 mcg  15 mcg Nebulization BID Manuela Escoto MD   15 mcg at 07/19/21 2017    bumetanide (BUMEX) injection 1 mg  1 mg Intravenous BID PATRIZIA Palomino - CNP   1 mg at 07/19/21 2043    traMADol (ULTRAM) tablet 50 mg  50 mg Oral Q6H PRN Marvin Del Angel, DO   50 mg at 07/19/21 2147    aspirin chewable tablet 81 mg  81 mg Oral Daily Marvin Del Angel, DO   81 mg at 07/19/21 0919    baclofen (LIORESAL) tablet 10 mg  10 mg Oral BID PRN Marvin Del Angel, DO   10 mg at 07/16/21 0312    carvedilol (COREG) tablet 25 mg  25 mg Oral BID WC Marvin Del Angel, DO   25 mg at 07/19/21 1539    docusate sodium (COLACE) capsule 100 mg  100 mg Oral Nightly Marvin Del Angel, DO   100 mg at 07/19/21 2041    DULoxetine (CYMBALTA) extended release capsule 60 mg  60 mg Oral BID Marvin Del Angel, DO   60 mg at 07/19/21 2041    gabapentin (NEURONTIN) capsule 100 mg  100 mg Oral BID Marvin Del Angel, DO   100 mg at 07/19/21 2041    guaiFENesin tablet 400 mg  400 mg Oral BID PRN Marvin Del Angel, DO   400 mg at 07/19/21 2147    hydrALAZINE (APRESOLINE) tablet 50 mg  50 mg Oral 3 times per day Marvin Del Angel, DO   50 mg at 07/20/21 0655    isosorbide mononitrate (IMDUR) extended release tablet 60 mg  60 mg Oral Daily Marvin Del Angel, DO   60 mg at 07/19/21 0920    metoclopramide (REGLAN) tablet 5 mg  5 mg Oral TID Marvin Del Angel, DO   5 mg at 07/20/21 0655    montelukast (SINGULAIR) tablet 10 mg  10 mg Oral Nightly Marvin Del Angel, DO   10 mg at 07/19/21 2041    sacubitril-valsartan (ENTRESTO) 49-51 MG per tablet 1 tablet  1 tablet Oral BID Marvin Del Angel DO   1 tablet at 07/19/21 2041    trospium (SANCTURA) tablet 20 mg  20 mg Oral BID AC Marvin Del Angel DO   20 mg at 07/20/21 0655    spironolactone (ALDACTONE) tablet 25 mg  25 mg Oral Daily Marvin E Volino, DO   25 mg at 07/19/21 0918    traZODone (DESYREL) tablet 100 mg  100 mg Oral Nightly Marvin E Volino, DO   100 mg at 07/19/21 2041    Vitamin D (CHOLECALCIFEROL) tablet 1,000 Units  1,000 Units Oral Daily Marvin E Volino, DO   1,000 Units at 07/19/21 0920    sodium chloride flush 0.9 % injection 10 mL  10 mL Intravenous 2 times per day Marvin E Volino, DO   10 mL at 07/19/21 2045    sodium chloride flush 0.9 % injection 10 mL  10 mL Intravenous PRN Marvin E Volino, DO        0.9 % sodium chloride infusion  25 mL Intravenous PRN Marvin E Volino, DO        potassium chloride (KLOR-CON M) extended release tablet 40 mEq  40 mEq Oral PRN Marvin E Volino, DO        Or    potassium bicarb-citric acid (EFFER-K) effervescent tablet 40 mEq  40 mEq Oral PRN Marvin E Volino, DO        Or    potassium chloride 10 mEq/100 mL IVPB (Peripheral Line)  10 mEq Intravenous PRN Marvin E Volino, DO        enoxaparin (LOVENOX) injection 40 mg  40 mg Subcutaneous Daily Marvin E Volino, DO   40 mg at 07/19/21 0920    senna (SENOKOT) tablet 8.6 mg  1 tablet Oral Daily PRN Marvin E Volino, DO   8.6 mg at 07/19/21 0918    acetaminophen (TYLENOL) tablet 650 mg  650 mg Oral Q6H PRN Marvin E Volino, DO   650 mg at 07/19/21 1355    Or    acetaminophen (TYLENOL) suppository 650 mg  650 mg Rectal Q6H PRN Marvin E Volino, DO        insulin lispro (HUMALOG) injection vial 0-12 Units  0-12 Units Subcutaneous TID WC Marvin E Volino, DO   2 Units at 07/20/21 0655    insulin lispro (HUMALOG) injection vial 0-6 Units  0-6 Units Subcutaneous Nightly Marvin E Volino, DO   6 Units at 07/19/21 2045    glucose (GLUTOSE) 40 % oral gel 15 g  15 g Oral PRN Marvin E Volino, DO        dextrose 50 % IV solution  12.5 g Intravenous PRN Marvin E Volino, DO        glucagon (rDNA) injection 1 mg  1 mg Intramuscular PRN Marvin E Volino, DO        dextrose 5 % solution  100 mL/hr Intravenous PRN Marvin Del Angel, DO        labetalol (NORMODYNE;TRANDATE) injection 5 mg  5 mg Intravenous Q4H PRN Marvin Del Angel, DO        simvastatin (ZOCOR) tablet 20 mg  20 mg Oral Nightly Marvin Del Angel, DO        insulin glargine (LANTUS) injection vial 40 Units  40 Units Subcutaneous Nightly Marvin Del Angel DO   40 Units at 07/19/21 2045      sodium chloride      dextrose         Physical Exam:  BP (!) 148/72   Pulse 67   Temp 96.9 °F (36.1 °C) (Axillary)   Resp 16   Ht 5' 1\" (1.549 m)   Wt 232 lb 1.6 oz (105.3 kg)   LMP 01/01/1990   SpO2 97%   BMI 43.85 kg/m²   Weight change: Wt Readings from Last 3 Encounters:   07/20/21 232 lb 1.6 oz (105.3 kg)   06/13/21 229 lb (103.9 kg)   05/20/21 219 lb (99.3 kg)     The patient is awake, alert and in no discomfort or distress. No gross musculoskeletal deformity is present. No significant skin or nail changes are present. Gross examination of head, eyes, nose and throat are negative. Jugular venous pressure is normal and no carotid bruits are present. Normal respiratory effort is noted with no accessory muscle usage present. Lung fields are clear to ascultation. Cardiac examination is notable for a regular rate and rhythm with no palpable thrill. No gallop rhythm or cardiac murmur are identified. A benign abdominal examination is present with no masses or organomegaly. Intact pulses are present throughout all extremities and no peripheral edema is present. No focal neurologic deficits are present. Intake/Output:    Intake/Output Summary (Last 24 hours) at 7/20/2021 0850  Last data filed at 7/19/2021 1717  Gross per 24 hour   Intake 220 ml   Output 1850 ml   Net -1630 ml     No intake/output data recorded. Laboratory Tests:  Lab Results   Component Value Date    CREATININE 1.2 (H) 07/20/2021    BUN 32 (H) 07/20/2021     07/20/2021    K 4.1 07/20/2021    CL 89 (L) 07/20/2021    CO2 40 (H) 07/20/2021     No results for input(s): CKTOTAL, CKMB in the last 72 hours.     Invalid input(s): TROPONONI  Lab Results   Component Value Date     (H) 01/26/2014     Lab Results   Component Value Date    WBC 8.2 07/20/2021    RBC 4.18 07/20/2021    HGB 12.2 07/20/2021    HCT 40.1 07/20/2021    MCV 95.9 07/20/2021    MCH 29.2 07/20/2021    MCHC 30.4 07/20/2021    RDW 13.7 07/20/2021     07/20/2021    MPV 10.7 07/20/2021     Recent Labs     07/18/21  0618 07/19/21  0420 07/20/21  0315   ALKPHOS 75 90 97   ALT 9 13 15   AST 6 10 9   PROT 6.7 6.7 6.6   BILITOT 0.3 0.3 0.3   LABALBU 3.3* 3.5 3.5     Lab Results   Component Value Date    MG 2.0 04/13/2019     Lab Results   Component Value Date    PROTIME 12.6 04/19/2021    PROTIME 12.6 04/22/2011    INR 1.1 04/19/2021     Lab Results   Component Value Date    TSH 0.646 02/05/2021     No components found for: CHLPL  Lab Results   Component Value Date    TRIG 219 (H) 02/05/2021    TRIG 89 06/16/2020    TRIG 84 06/05/2018     Lab Results   Component Value Date    HDL 27 02/05/2021    HDL 46 06/16/2020    HDL 38 06/05/2018     Lab Results   Component Value Date    LDLCALC 60 02/05/2021    LDLCALC 98 06/16/2020    LDLCALC 167 (H) 06/05/2018       Cardiac Tests:  Telemetry findings reviewed: sinus rhythm with 3-4 beat episodes of asymptomatic nonsustained ventricular tachycardia, no new tachy/bradyarrhythmias overnight      ASSESSMENT / PLAN: On a clinical basis, the patient remains compensated from a cardiovascular standpoint with ongoing fluid mobilization (cumulative in excess of 8 L) and improvement of her volume status. Presently conversion of her diuretics to oral administration will be initiated with ongoing needs of careful monitoring of her volume status as well as that of renal function and electrolytes. None relative hypertension, further modification of her hydralazine will be initiated to further assist diastolic cardiac performance.   Additional management for chronic combined hypoxic and hypercapnic respiratory failure will be deferred to the pulmonary service with an extensive discussion regarding her needs of smoking cessation both to reduce risk of adverse effects on her chronic obstructive lung disease as well as to reduce her risk of atherosclerotic development completed the time of her assessment. Appropriate lifestyle modification to achieve weight reduction will benefit both diastolic cardiac performance and assist in management for obstructive sleep apnea. Ongoing aggressive risk factor modification of blood pressure, diabetes and serum lipids will remain essential to reducing risk of future atherosclerotic development. Note: This report was completed utilizing computer voice recognition software. Every effort has been made to ensure accuracy, however; inadvertent computerized transcription errors may be present. Jose Rasmussen.  Oscar Renner, [facility]

## 2021-07-20 NOTE — PROGRESS NOTES
Pulmonary  Progress Note    Admit Date: 7/15/2021                            PCP: Giovanni Fierro DO  Patient Active Problem List   Diagnosis    [facility]bid obesity [facility] to excess calories [facility]    Hyperlipidemia    DAYAN and COPD overlap syndrome (HCC)    Vitamin D insufficiency    Chronic combined systolic and diastolic heart failure (HCC)    GERD (gastroesophageal reflux disease)    Major depressive disorder, recurrent episode, mild ([address]    Diabetic polyneuropathy associated with type 2 diabetes mellitus (HCC)    Chronic passive hepatic congestion    Mixed incontinence urge and stress    Chronic back pain - d/t muscle spasm    Glaucoma, open angle    Elevated CA 19-9 level    Lumbar stenosis    Spondylosis 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    Lumbar disc herniation    Asymmetric septal hypertrophy (HCC)    Non-compliance    Hiatal hernia    Melanosis coli    Coronary artery disease involving native coronary artery of native heart without angina pectoris    DM2 (diabetes mellitus, type 2) ([facility]    Cigarette smoker    Marijuana use, smoked    Ischemic cardiomyopathy    PVD (peripheral vascular disease) with claudication ([facility]    Chronic venous insufficiency    History of non-ST elevation myocardial infarction (NSTEMI)    QT prolongation    History of stroke    Chronic respiratory failure with hypoxia and hypercapnia (HCC)    Acute respiratory failure with hypoxia (HCC)    COPD [facility]ch[facility] disease) ([facility]    Status post peripheral artery angioplasty    Uncontrolled hypertension    Acute on chronic congestive heart failure ([facility])       Subjective:  Sitting up in chair finished with breakfast  Wore AVAPS all night   On room air  Complains of R knee pain and feeling like she has a lump in her throat, no trouble swallowing  Denies dyspnea      Medications:   sodium chloride      dextrose          lidocaine  1 patch Transdermal Daily    [START ON 2021] bumetanide  2 mg Oral Daily    hydrALAZINE  75 mg Oral 3 times per day    polyethylene glycol  17 g Oral Daily    nystatin  5 mL Oral 4x Daily    predniSONE  40 mg Oral Daily with breakfast    miconazole   Topical BID    ipratropium-albuterol  1 ampule Inhalation Q4H WA    Arformoterol Tartrate  15 mcg Nebulization BID    aspirin  81 mg Oral Daily    carvedilol  25 mg Oral BID WC    docusate sodium  100 mg Oral Nightly    DULoxetine  60 mg Oral BID    gabapentin  100 mg Oral BID    isosorbide mononitrate  60 mg Oral Daily    metoclopramide  5 mg Oral TID    montelukast  10 mg Oral Nightly    sacubitril-valsartan  1 tablet Oral BID    trospium  20 mg Oral BID AC    spironolactone  25 mg Oral Daily    traZODone  100 mg Oral Nightly    Vitamin D  1,000 Units Oral Daily    sodium chloride flush  10 mL Intravenous 2 times per day    enoxaparin  40 mg Subcutaneous Daily    insulin lispro  0-12 Units Subcutaneous TID WC    insulin lispro  0-6 Units Subcutaneous Nightly    simvastatin  20 mg Oral Nightly    insulin glargine  40 Units Subcutaneous Nightly       Vitals:  Tmax:  VITALS:  BP (!) 140/64   Pulse 70   Temp 99.1 °F (37.3 °C) (Oral)   Resp 18   Ht 5' 1\" (1.549 m)   Wt 232 lb 1.6 oz (105.3 kg)   LMP 1990   SpO2 97%   BMI 43.85 kg/m²   24HR INTAKE/OUTPUT:      Intake/Output Summary (Last 24 hours) at 2021 1038  Last data filed at 2021 1717  Gross per 24 hour   Intake 220 ml   Output 1850 ml   Net -1630 ml     CURRENT PULSE OXIMETRY:  SpO2: 97 %  24HR PULSE OXIMETRY RANGE:  SpO2  Av %  Min: 93 %  Max: 97 %  CVP:    VENT SETTINGS:   Vent Information  Skin Assessment: Clean, dry, & intact  Vt Ordered: 500 mL  FiO2 : 30 %  SpO2: 97 %  I Time/ I Time %: 0.9 s  Mask Type: Full face mask  Mask Size: Small  Additional Respiratory  Assessments  Pulse: 70  Resp: 18  SpO2: 97 %      EXAM:  General: No distress. Alert. Eyes: PERRL. No sclera icterus.  No conjunctival injection. ENT: No discharge. Pharynx clear. Neck: Trachea midline. Normal thyroid. Resp: No accessory muscle use. Diminished throughout. No crackles. No wheezing. No rhonchi. CV: Regular rate. Regular rhythm. No mumur or rub. No BLE edema. ABD: Non-tender. Non-distended. No masses. No organmegaly. Normal bowel sounds. Skin: Warm and dry. No nodule on exposed extremities. No rash on exposed extremities. Lymph: No cervical LAD. No supraclavicular LAD. M/S: No cyanosis. No joint deformity. No clubbing. Neuro: Alert. Follows commands. I/O: I/O last 3 completed shifts: In: 220 [P.O.:220]  Out: 1850 [Urine:1850]  No intake/output data recorded. Results:  CBC:   Recent Labs     07/18/21  0618 07/19/21  0420 07/20/21  0315   WBC 7.4 7.7 8.2   HGB 11.0* 12.0 12.2   HCT 37.0 39.7 40.1   MCV 97.4 97.3 95.9    267 287     BMP:   Recent Labs     07/18/21  0618 07/19/21  0420 07/20/21  0315    135 132   K 4.7 4.3 4.1   CL 95* 90* 89*   CO2 36* 39* 40*   BUN 25* 30* 32*   CREATININE 1.1* 1.2* 1.2*       Cultures:  Results for Charline Wilkes (MRN [id number]) as of 7/18/2021 08:39   Ref.  Range 7/17/2021 13:00   Influenza A by PCR Latest Ref Range: Not Detected  Not Detected   Influenza B by PCR Latest Ref Range: Not Detected  Not Detected   Adenovirus by PCR Latest Ref Range: Not Detected  Not Detected   Coronavirus 229E by PCR Latest Ref Range: Not Detected  Not Detected   Coronavirus HKU1 by PCR Latest Ref Range: Not Detected  Not Detected   Coronavirus NL63 by PCR Latest Ref Range: Not Detected  Not Detected   Coronavirus OC43 by PCR Latest Ref Range: Not Detected  Not Detected   Human Metapneumovirus by PCR Latest Ref Range: Not Detected  Not Detected   Human Rhinovirus/Enterovirus by PCR Latest Ref Range: Not Detected  Not Detected   Parainfluenza Virus 1 by PCR Latest Ref Range: Not Detected  Not Detected   Parainfluenza Virus 2 by PCR Latest Ref Range: Not Detected  Not Detected   Parainfluenza Virus 3 by PCR Latest Ref Range: Not Detected  Not Detected   Parainfluenza Virus 4 by PCR Latest Ref Range: Not Detected  Not Detected   Respiratory Syncytial Virus by PCR Latest Ref Range: Not Detected  Not Detected   Bordetella parapertussis by PCR Latest Ref Range: Not Detected  Not Detected   Chlamydophilia pneumoniae by PCR Latest Ref Range: Not Detected  Not Detected   Mycoplasma pneumoniae by PCR Latest Ref Range: Not Detected  Not Detected   SARS-CoV-2, PCR Latest Ref Range: Not Detected  Not Detected   Bordetella pertussis by PCR Latest Ref Range: Not Detected  Not Detected       ABG:   Results for Peggy Villalobos (MRN [id number]) as of 7/17/2021 09:12   Ref. Range 7/16/2021 09:34   Source: Unknown Blood Arterial   pH, Blood Gas Latest Ref Range: 7.350 - 7.450  7.370   PCO2 Latest Ref Range: 35.0 - 45.0 mmHg 66.0 (H)   pO2 Latest Ref Range: 75.0 - 100.0 mmHg 72.8 (L)   HCO3 Latest Ref Range: 22.0 - 26.0 mmol/L 37.3 (H)     Results for Peggy Villalobos (MRN [id number]) as of 7/18/2021 08:39   Ref. Range 7/18/2021 06:54   pH, Blood Gas Latest Ref Range: 7.350 - 7.450  7.448   PCO2 Latest Ref Range: 35.0 - 45.0 mmHg 55.9 (H)   pO2 Latest Ref Range: 75.0 - 100.0 mmHg 76.9   HCO3 Latest Ref Range: 22.0 - 26.0 mmol/L 37.8 (H)       7/18/21 Respiratory culture: Group 6: <25 PMN's/LPF and <25 Epithelial cells/LPF Rare Polymorphonuclear leukocytes  Few Epithelial cells  Few Gram positive cocci in clusters  Moderate Gram positive cocci in chains  Rare Gram variable rods        Films:  7/15/21 CXR : Impression Opacities may represent moderate pulmonary edema. Multifocal pneumonia may also be considered in the proper clinical setting. Small left-sided pleural effusion.          Assessment:  · Acute hypoxemic respiratory failure  · Acute exacerbation of COPD  · Previous COVID infection   · DAYAN   · Current smoker  · CHF - cardiomyopathy  · CAD s/p CABG  · HTN  · DM      Plan:  · Continue 02, currently at 1L nc, wean as able to keep pox >90%. Baseline room air - was on previous O2 at home secondary to [place]. Check ambulatory pulse ox. · Continue AVAPS HS and prn - ABG with pCO2 55.9, improved. Will continue to follow. · BNP elevated with congestion on CXR - cardiology consulted and changed diuretic to IV BID. 8.3 L out since admit. On aldactone also. · Continue brovana and duonebs  · Continue steroids  · Sputum culture with mod gram + cocci in chains, procal 0.03, no role for abx at this time Respiratory panel negative  · IS  · Ok for home from pulmonary perspective      PATRIZIA Morgan - CNP  7/20/2021  10:38 AM      Evaluated, agree with above assessment and plan  Walking O2 today showed saturation above 90% at rest and exertion. No need of supplemental O2  Patient definitely will need AVAPS, noninvasive ventilation nightly. As per ABGs better performance noticed when compared with home BiPAP settings.   Will notify [id number] after discharge  Okay to be discharged home from pulmonary perspective, follow-up in 2 to 4 weeks

## 2021-07-21 ENCOUNTER — TELEPHONE (OUTPATIENT)
Dept: FAMILY MEDICINE CLINIC | Age: 66
End: 2021-07-21

## 2021-07-21 VITALS
OXYGEN SATURATION: 97 % | HEIGHT: 61 IN | SYSTOLIC BLOOD PRESSURE: 107 MMHG | HEART RATE: 72 BPM | RESPIRATION RATE: 18 BRPM | WEIGHT: 223.4 LBS | BODY MASS INDEX: 42.18 KG/M2 | DIASTOLIC BLOOD PRESSURE: 78 MMHG | TEMPERATURE: 98.5 F

## 2021-07-21 LAB
METER GLUCOSE: 118 MG/DL (ref 74–99)
METER GLUCOSE: 200 MG/DL (ref 74–99)

## 2021-07-21 PROCEDURE — 6370000000 HC RX 637 (ALT 250 FOR IP): Performed by: INTERNAL MEDICINE

## 2021-07-21 PROCEDURE — 6370000000 HC RX 637 (ALT 250 FOR IP): Performed by: NURSE PRACTITIONER

## 2021-07-21 PROCEDURE — 6360000002 HC RX W HCPCS: Performed by: INTERNAL MEDICINE

## 2021-07-21 PROCEDURE — 94660 CPAP INITIATION&MGMT: CPT

## 2021-07-21 PROCEDURE — 94640 AIRWAY INHALATION TREATMENT: CPT

## 2021-07-21 PROCEDURE — 99232 SBSQ HOSP IP/OBS MODERATE 35: CPT | Performed by: INTERNAL MEDICINE

## 2021-07-21 PROCEDURE — 82962 GLUCOSE BLOOD TEST: CPT

## 2021-07-21 RX ORDER — IPRATROPIUM BROMIDE AND ALBUTEROL SULFATE 2.5; .5 MG/3ML; MG/3ML
3 SOLUTION RESPIRATORY (INHALATION)
Qty: 360 ML | Refills: 0 | Status: SHIPPED | OUTPATIENT
Start: 2021-07-21 | End: 2022-04-11

## 2021-07-21 RX ORDER — BUMETANIDE 2 MG/1
2 TABLET ORAL DAILY
Qty: 30 TABLET | Refills: 3 | Status: SHIPPED
Start: 2021-07-21 | End: 2021-09-15 | Stop reason: SDUPTHER

## 2021-07-21 RX ORDER — ARFORMOTEROL TARTRATE 15 UG/2ML
15 SOLUTION RESPIRATORY (INHALATION) 2 TIMES DAILY
Qty: 120 ML | Refills: 3 | Status: SHIPPED | OUTPATIENT
Start: 2021-07-21 | End: 2022-04-11

## 2021-07-21 RX ORDER — LIDOCAINE 4 G/G
1 PATCH TOPICAL DAILY
Qty: 1 BOX | Refills: 0 | Status: SHIPPED | OUTPATIENT
Start: 2021-07-21 | End: 2021-09-15 | Stop reason: ALTCHOICE

## 2021-07-21 RX ORDER — PREDNISONE 10 MG/1
TABLET ORAL
Qty: 18 TABLET | Refills: 0 | Status: SHIPPED | OUTPATIENT
Start: 2021-07-21 | End: 2021-09-15 | Stop reason: ALTCHOICE

## 2021-07-21 RX ORDER — HYDRALAZINE HYDROCHLORIDE 25 MG/1
75 TABLET, FILM COATED ORAL EVERY 8 HOURS SCHEDULED
Qty: 90 TABLET | Refills: 3 | Status: SHIPPED
Start: 2021-07-21 | End: 2021-07-22 | Stop reason: DRUGHIGH

## 2021-07-21 RX ADMIN — TRAMADOL HYDROCHLORIDE 50 MG: 50 TABLET, FILM COATED ORAL at 09:08

## 2021-07-21 RX ADMIN — INSULIN LISPRO 4 UNITS: 100 INJECTION, SOLUTION INTRAVENOUS; SUBCUTANEOUS at 11:40

## 2021-07-21 RX ADMIN — NYSTATIN 500000 UNITS: 500000 SUSPENSION ORAL at 09:18

## 2021-07-21 RX ADMIN — CARVEDILOL 25 MG: 25 TABLET, FILM COATED ORAL at 09:08

## 2021-07-21 RX ADMIN — ARFORMOTEROL TARTRATE 15 MCG: 15 SOLUTION RESPIRATORY (INHALATION) at 08:38

## 2021-07-21 RX ADMIN — ENOXAPARIN SODIUM 40 MG: 40 INJECTION SUBCUTANEOUS at 09:07

## 2021-07-21 RX ADMIN — PREDNISONE 40 MG: 20 TABLET ORAL at 11:40

## 2021-07-21 RX ADMIN — METOCLOPRAMIDE 5 MG: 5 TABLET ORAL at 11:40

## 2021-07-21 RX ADMIN — BUMETANIDE 2 MG: 1 TABLET ORAL at 09:08

## 2021-07-21 RX ADMIN — ASPIRIN 81 MG: 81 TABLET, CHEWABLE ORAL at 09:08

## 2021-07-21 RX ADMIN — GABAPENTIN 100 MG: 100 CAPSULE ORAL at 09:08

## 2021-07-21 RX ADMIN — ISOSORBIDE MONONITRATE 60 MG: 60 TABLET, EXTENDED RELEASE ORAL at 09:07

## 2021-07-21 RX ADMIN — SACUBITRIL AND VALSARTAN 1 TABLET: 49; 51 TABLET, FILM COATED ORAL at 09:07

## 2021-07-21 RX ADMIN — IPRATROPIUM BROMIDE AND ALBUTEROL SULFATE 1 AMPULE: 2.5; .5 SOLUTION RESPIRATORY (INHALATION) at 12:31

## 2021-07-21 RX ADMIN — IPRATROPIUM BROMIDE AND ALBUTEROL SULFATE 1 AMPULE: 2.5; .5 SOLUTION RESPIRATORY (INHALATION) at 08:38

## 2021-07-21 RX ADMIN — MICONAZOLE NITRATE: 20 POWDER TOPICAL at 09:18

## 2021-07-21 RX ADMIN — SPIRONOLACTONE 25 MG: 25 TABLET ORAL at 09:08

## 2021-07-21 RX ADMIN — DULOXETINE HYDROCHLORIDE 60 MG: 60 CAPSULE, DELAYED RELEASE ORAL at 09:07

## 2021-07-21 RX ADMIN — POLYETHYLENE GLYCOL 3350 17 G: 17 POWDER, FOR SOLUTION ORAL at 09:08

## 2021-07-21 RX ADMIN — METOCLOPRAMIDE 5 MG: 5 TABLET ORAL at 05:30

## 2021-07-21 RX ADMIN — HYDRALAZINE HYDROCHLORIDE 75 MG: 50 TABLET, FILM COATED ORAL at 05:26

## 2021-07-21 RX ADMIN — TROSPIUM CHLORIDE 20 MG: 20 TABLET, FILM COATED ORAL at 05:30

## 2021-07-21 RX ADMIN — Medication 1000 UNITS: at 09:07

## 2021-07-21 ASSESSMENT — ENCOUNTER SYMPTOMS
DIARRHEA: 0
SHORTNESS OF BREATH: 1
VOMITING: 0
COUGH: 0
NAUSEA: 0

## 2021-07-21 ASSESSMENT — PAIN SCALES - GENERAL
PAINLEVEL_OUTOF10: 0
PAINLEVEL_OUTOF10: 10
PAINLEVEL_OUTOF10: 2

## 2021-07-21 NOTE — PROGRESS NOTES
Date: 7/20/2021    Time: 10:24 PM    Patient Placed On BIPAP/CPAP/ Non-Invasive Ventilation? Yes    If no must comment. Facial area red/color change? No           If YES are Blister/Lesion present? No   If yes must notify nursing staff  BIPAP/CPAP skin barrier?   Yes    Skin barrier type:mepilexlite       Comments:        Cleo Garcia RCP

## 2021-07-21 NOTE — PROGRESS NOTES
INPATIENT CARDIOLOGY FOLLOW-UP    Name: Codi Chong    Age: 76 y.o. Date of Admission: 7/15/2021  2:20 PM    Date of Service: 7/21/2021    Chief Complaint: Follow-up for acute superimposed upon chronic diastolic heart failure, chronic combined hypoxic and hypercapnic respiratory failure, chronic obstructive lung disease, morbid obesity, obstructive sleep apnea    Interim History: The patient is presently sleeping comfortably while utilizing nocturnal CPAP with no abnormalities of radiographic assessment in light of her previous symptomatology. Ongoing fluid mobilization is occurred with stabilization of blood pressures following optimization of medical management. Review of Systems: The remainder of a complete multisystem review including consitutional, central nervous, respiratory, circulatory, gastrointestinal, genitourinary, endocrinologic, hematologic, musculoskeletal and psychiatric are negative.     Problem List:  Patient Active Problem List   Diagnosis    Morbid obesity due to excess calories (HCC)    Hyperlipidemia    DAYAN and COPD overlap syndrome (HCC)    Vitamin D insufficiency    Chronic combined systolic and diastolic heart failure (HCC)    GERD (gastroesophageal reflux disease)    Major depressive disorder, recurrent episode, mild ([address]    Diabetic polyneuropathy associated with type 2 diabetes mellitus (HCC)    Chronic passive hepatic congestion    Mixed incontinence urge and stress    Chronic back pain - d/t muscle spasm    Glaucoma, open angle    Elevated CA 19-9 level    Lumbar stenosis    Spondylosis 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    Lumbar disc herniation    Asymmetric septal hypertrophy (HCC)    Non-compliance    Hiatal hernia    Melanosis coli    Coronary artery disease involving native coronary artery of native heart without angina pectoris    DM2 (diabetes mellitus, type 2) ([address]    Cigarette smoker    Marijuana use, smoked    Daily Tana NACNY Justin, DO   500,000 Units at 07/21/21 0918    predniSONE (DELTASONE) tablet 40 mg  40 mg Oral Daily with breakfast Adeline Back APRJOSÉ LUIS - CNP   40 mg at 07/20/21 0956    miconazole (MICOTIN) 2 % powder   Topical BID Priscilla Vazquez MD   Given at 07/21/21 0918    ipratropium-albuterol (DUONEB) nebulizer solution 1 ampule  1 ampule Inhalation Q4H WA Irma Renae MD   1 ampule at 07/21/21 2464    Arformoterol Tartrate (BROVANA) nebulizer solution 15 mcg  15 mcg Nebulization BID Irma Renae MD   15 mcg at 07/21/21 0838    traMADol (ULTRAM) tablet 50 mg  50 mg Oral Q6H PRN Marvin Del Angel, DO   50 mg at 07/21/21 0908    aspirin chewable tablet 81 mg  81 mg Oral Daily Marvin Del Angel, DO   81 mg at 07/21/21 0908    baclofen (LIORESAL) tablet 10 mg  10 mg Oral BID PRN Marvin Del Angel, DO   10 mg at 07/16/21 4086    carvedilol (COREG) tablet 25 mg  25 mg Oral BID WC Marvin Del Angel, DO   25 mg at 07/21/21 0908    docusate sodium (COLACE) capsule 100 mg  100 mg Oral Nightly Marvin Del Angel, DO   100 mg at 07/20/21 2024    DULoxetine (CYMBALTA) extended release capsule 60 mg  60 mg Oral BID Marvin Del Angel, DO   60 mg at 07/21/21 6204    gabapentin (NEURONTIN) capsule 100 mg  100 mg Oral BID Marvin Del Angel, DO   100 mg at 07/21/21 0908    guaiFENesin tablet 400 mg  400 mg Oral BID PRN Marvin Del Angel, DO   400 mg at 07/20/21 2035    isosorbide mononitrate (IMDUR) extended release tablet 60 mg  60 mg Oral Daily Marvin Del Angel, DO   60 mg at 07/21/21 0907    metoclopramide (REGLAN) tablet 5 mg  5 mg Oral TID Marvin Del Angel, DO   5 mg at 07/21/21 0530    montelukast (SINGULAIR) tablet 10 mg  10 mg Oral Nightly Marvin Del Angel, DO   10 mg at 07/20/21 2024    sacubitril-valsartan (ENTRESTO) 49-51 MG per tablet 1 tablet  1 tablet Oral BID Marvin Del Angel DO   1 tablet at 07/21/21 0907    trospium (SANCTURA) tablet 20 mg  20 mg Oral BID AC Marvin Del Angel DO   20 mg at 07/21/21 0583    spironolactone (ALDACTONE) tablet 25 mg  25 mg Oral Daily Marvin E Volino, DO   25 mg at 07/21/21 0908    traZODone (DESYREL) tablet 100 mg  100 mg Oral Nightly Marvin E Volino, DO   100 mg at 07/20/21 2024    Vitamin D (CHOLECALCIFEROL) tablet 1,000 Units  1,000 Units Oral Daily Marvin E Volino, DO   1,000 Units at 07/21/21 5722    sodium chloride flush 0.9 % injection 10 mL  10 mL Intravenous 2 times per day Marvin E Volino, DO   10 mL at 07/20/21 2156    sodium chloride flush 0.9 % injection 10 mL  10 mL Intravenous PRN Marvin E Volino, DO        0.9 % sodium chloride infusion  25 mL Intravenous PRN Marvin E Volino, DO        potassium chloride (KLOR-CON M) extended release tablet 40 mEq  40 mEq Oral PRN Marvin E Volino, DO        Or    potassium bicarb-citric acid (EFFER-K) effervescent tablet 40 mEq  40 mEq Oral PRN Marvin E Volino, DO        Or    potassium chloride 10 mEq/100 mL IVPB (Peripheral Line)  10 mEq Intravenous PRN Marvin E Volino, DO        enoxaparin (LOVENOX) injection 40 mg  40 mg Subcutaneous Daily Marvin E Volino, DO   40 mg at 07/21/21 8769    senna (SENOKOT) tablet 8.6 mg  1 tablet Oral Daily PRN Marvin E Volino, DO   8.6 mg at 07/20/21 1646    acetaminophen (TYLENOL) tablet 650 mg  650 mg Oral Q6H PRN Marvin E Volino, DO   650 mg at 07/19/21 1355    Or    acetaminophen (TYLENOL) suppository 650 mg  650 mg Rectal Q6H PRN Marvin E Volino, DO        insulin lispro (HUMALOG) injection vial 0-12 Units  0-12 Units Subcutaneous TID WC Marvin E Volino, DO   6 Units at 07/20/21 1635    insulin lispro (HUMALOG) injection vial 0-6 Units  0-6 Units Subcutaneous Nightly Marvin E Volino, DO   6 Units at 07/20/21 2025    glucose (GLUTOSE) 40 % oral gel 15 g  15 g Oral PRN Marvin E Volino, DO        dextrose 50 % IV solution  12.5 g Intravenous PRN Marvin CRUZ Volino, DO        glucagon (rDNA) injection 1 mg  1 mg Intramuscular PRN Marvin Del Angel DO        dextrose 5 % solution  100 mL/hr Intravenous PRN Marvin Del Angel DO        labetalol (NORMODYNE;TRANDATE) injection 5 mg  5 mg Intravenous Q4H PRN Marvin Del Angel DO        simvastatin (ZOCOR) tablet 20 mg  20 mg Oral Nightly Marvin Del Angel DO        insulin glargine (LANTUS) injection vial 40 Units  40 Units Subcutaneous Nightly Marvin Del Angel DO   40 Units at 07/20/21 2025      sodium chloride      dextrose         Physical Exam:  /78   Pulse 72   Temp 98.5 °F (36.9 °C) (Oral)   Resp 18   Ht 5' 1\" (1.549 m)   Wt 223 lb 6.4 oz (101.3 kg)   LMP 01/01/1990   SpO2 97%   BMI 42.21 kg/m²   Weight change: 4 lb (1.814 kg)  Wt Readings from Last 3 Encounters:   07/21/21 223 lb 6.4 oz (101.3 kg)   06/13/21 229 lb (103.9 kg)   05/20/21 219 lb (99.3 kg)     The patient is sleeping comfortable while utilizing her BiPAP mask and in no discomfort or distress. No gross musculoskeletal deformity is present. No significant skin or nail changes are present. Gross examination of head, eyes, nose and throat are negative. Jugular venous pressure is normal and no carotid bruits are present. Normal respiratory effort is noted with no accessory muscle usage present. Lung fields are clear to ascultation. Cardiac examination is notable for a regular rate and rhythm with no palpable thrill. No gallop rhythm or cardiac murmur are identified. A benign abdominal examination is present with the exception of obesity and no masses or organomegaly. Intact pulses are present throughout all extremities and no peripheral edema is present. No focal neurologic deficits are present. Intake/Output:    Intake/Output Summary (Last 24 hours) at 7/21/2021 0919  Last data filed at 7/20/2021 1945  Gross per 24 hour   Intake 230 ml   Output 2000 ml   Net -1770 ml     No intake/output data recorded.     Laboratory Tests:  Lab Results   Component Value Date    CREATININE 1.2 (H) 07/20/2021    BUN 32 (H) 07/20/2021     07/20/2021    K 4.1 07/20/2021    CL 89 (L) 07/20/2021    CO2 40 (H) 07/20/2021     No results for input(s): CKTOTAL, CKMB in the last 72 hours. Invalid input(s): TROPONONI  Lab Results   Component Value Date     (H) 01/26/2014     Lab Results   Component Value Date    WBC 8.2 07/20/2021    RBC 4.18 07/20/2021    HGB 12.2 07/20/2021    HCT 40.1 07/20/2021    MCV 95.9 07/20/2021    MCH 29.2 07/20/2021    MCHC 30.4 07/20/2021    RDW 13.7 07/20/2021     07/20/2021    MPV 10.7 07/20/2021     Recent Labs     07/19/21  0420 07/20/21  0315   ALKPHOS 90 97   ALT 13 15   AST 10 9   PROT 6.7 6.6   BILITOT 0.3 0.3   LABALBU 3.5 3.5     Lab Results   Component Value Date    MG 2.0 04/13/2019     Lab Results   Component Value Date    PROTIME 12.6 04/19/2021    PROTIME 12.6 04/22/2011    INR 1.1 04/19/2021     Lab Results   Component Value Date    TSH 0.646 02/05/2021     No components found for: CHLPL  Lab Results   Component Value Date    TRIG 219 (H) 02/05/2021    TRIG 89 06/16/2020    TRIG 84 06/05/2018     Lab Results   Component Value Date    HDL 27 02/05/2021    HDL 46 06/16/2020    HDL 38 06/05/2018     Lab Results   Component Value Date    LDLCALC 60 02/05/2021    LDLCALC 98 06/16/2020    LDLCALC 167 (H) 06/05/2018       Cardiac Tests:  Telemetry findings reviewed: sinus rhythm no new tachy/bradyarrhythmias overnight      ASSESSMENT / PLAN: On clinical basis, the patient remains compensated from a cardiovascular standpoint with ongoing fluid mobilization and stabilization of her volume status. She is normotensive and tolerant of modification of her medical regimen with appropriate compliance of the use of her nocturnal BiPAP with most recent pulmonary assessment reviewed and plans of modification of her home CPAP settings.   The continuation of her existing cardiovascular medical regimen remains advisable with need of careful monitoring of her volume status as well as that of appropriate lifestyle modification inclusive of smoking cessation to reduce risk of further adverse effects on her chronic obstructive lung disease as well as that of atherosclerotic risk and weight reduction to benefit diastolic cardiac performance and the management of her obstructive sleep apnea. Ongoing aggressive risk factor modification of blood pressure, diabetes and serum lipids remain essential to reducing risk of future atherosclerotic development. We will further evaluate her during hospitalization should additional cardiovascular difficulties or concerns arise with planned reassessment by her primary cardiologist, Carlos Cifuentes arranged within the next 7 to 10 days to assist in reducing risk of recurrent hospitalization and potential benefits of enrollment at the heart failure clinic as well as potential consideration of a CardioMEMS device to further assist in diastolic heart failure management. Note: This report was completed utilizing computer voice recognition software. Every effort has been made to ensure accuracy, however; inadvertent computerized transcription errors may be present. Vaishnavi Kenney.  Monae Kaiser, [facility]

## 2021-07-21 NOTE — CARE COORDINATION
7/21/2021  Social Work Discharge Planning:Notified [facility] of Pts discharge. Order is in. Electronically signed by SINDHU Espinoza on 7/21/2021 at 9:27 AM    7/21/2021  Social Work Discharge Planning:SW was informed that Pt will need a nebulizer. SW made a referral to [place] with [facility]. She is following. Nebulizer will be delivered to the home. Need nebulizer order.  Electronically signed by SINDHU Espinoza on 7/21/2021 at 11:20 AM

## 2021-07-21 NOTE — PROGRESS NOTES
Progress Note  Date:2021       Room:[facility]  Patient Name:Steph Pedraza     YOB: 1955     Age:65 y.o. Patient seen for follow up of Acute hypoxic respiratory failure. Patient sleeping this am on NIV. Lump sensation improved. She denies any fevers, chills, nausea, vomiting, chest pains, or increased shortness of breath. Having some pain at artificial knee but denies any recent falls or trauma to the area. Subjective    Subjective:  Symptoms:  Improved. She reports shortness of breath. No cough, chest pain, headache, chest pressure or diarrhea. (Unable to obtain. ). Diet:  No nausea or vomiting. Activity level: Impaired due to pain. Review of Systems   Respiratory: Positive for shortness of breath. Negative for cough. Cardiovascular: Negative for chest pain. Gastrointestinal: Negative for diarrhea, nausea and vomiting. Objective         Vitals Last 24 Hours:  TEMPERATURE:  Temp  Av.7 °F (37.1 °C)  Min: 98.2 °F (36.8 °C)  Max: 99.1 °F (37.3 °C)  RESPIRATIONS RANGE: Resp  Av.3  Min: 17  Max: 27  PULSE OXIMETRY RANGE: SpO2  Av.5 %  Min: 94 %  Max: 97 %  PULSE RANGE: Pulse  Av.5  Min: 70  Max: 89  BLOOD PRESSURE RANGE: Systolic (91NFJ), LQQ:630 , Min:115 , BWZ:778   ; Diastolic (76YFF), BJH:09, Min:62, Max:86    I/O (24Hr): Intake/Output Summary (Last 24 hours) at 2021 0727  Last data filed at 2021 1945  Gross per 24 hour   Intake 230 ml   Output 2000 ml   Net -1770 ml     Objective:  General Appearance:  Comfortable, well-appearing and in no acute distress. Vital signs: (most recent): Blood pressure 132/78, pulse 89, temperature 98.2 °F (36.8 °C), temperature source Axillary, resp. rate 17, height 5' 1\" (1.549 m), weight 223 lb 6.4 oz (101.3 kg), last menstrual period 1990, SpO2 94 %, not currently breastfeeding. Lungs:  Normal effort and normal respiratory rate. There are decreased breath sounds.   No rales or rhonchi. Heart: Normal rate. Regular rhythm. S1 normal and S2 normal.  No friction rub. Abdomen: Abdomen is soft and non-distended. Bowel sounds are normal.   There is no abdominal tenderness. There is no rebound tenderness. There is no guarding. Extremities: Normal range of motion. There is no dependent edema. Skin:  Warm and dry. Labs/Imaging/Diagnostics    Labs:  CBC:  Recent Labs     07/19/21 0420 07/20/21  0315   WBC 7.7 8.2   RBC 4.08 4.18   HGB 12.0 12.2   HCT 39.7 40.1   MCV 97.3 95.9   RDW 13.7 13.7    287     CHEMISTRIES:  Recent Labs     07/19/21 0420 07/20/21 0315    132   K 4.3 4.1   CL 90* 89*   CO2 39* 40*   BUN 30* 32*   CREATININE 1.2* 1.2*   GLUCOSE 239* 223*     PT/INR:No results for input(s): PROTIME, INR in the last 72 hours. APTT:No results for input(s): APTT in the last 72 hours. LIVER PROFILE:  Recent Labs     07/19/21 0420 07/20/21  0315   AST 10 9   ALT 13 15   BILITOT 0.3 0.3   ALKPHOS 90 97       Imaging Last 24 Hours:  No results found.   Assessment//Plan           Hospital Problems         Last Modified POA    * (Principal) Acute respiratory failure with hypoxia (HCC) 7/16/2021 Yes    Chronic combined systolic and diastolic heart failure ([facility] 7/16/2021 Yes    Overview Signed 10/4/2013  2:29 PM by Estrada Conway MD     Cath Jan 2013-   EF 30%, global LV hypokinesis  plaque in distal left main, 50% LAD, circumflex  Mean pulmonary pressure 37         Coronary artery disease involving native coronary artery of native heart without angina pectoris (Chronic) 7/16/2021 Yes    Chronic respiratory failure with hypoxia and hypercapnia (HCC) (Chronic) 7/16/2021 Yes    DAYAN and COPD overlap syndrome ([facility] (Chronic) 7/16/2021 Yes    Overview Signed 10/4/2013 11:47 AM by Estrada Conway MD     On bipap 22/18, f/u Dr West Silva         DM2 (diabetes mellitus, type 2) ([address] (Chronic) 7/16/2021 Yes    Morbid obesity due to excess calories ([facility] (Chronic) 7/16/2021 Yes Cigarette smoker (Chronic) 7/16/2021 Yes    Hyperlipidemia (Chronic) 7/16/2021 Yes    Vitamin D insufficiency 7/16/2021 Yes    GERD (gastroesophageal reflux disease) (Chronic) 7/16/2021 Yes    Major depressive disorder, recurrent episode, mild ([address] 7/16/2021 Yes    Diabetic polyneuropathy associated with type 2 diabetes mellitus ([address] 7/16/2021 Yes    Chronic passive hepatic congestion 7/16/2021 Yes    Chronic back pain - d/t muscle spasm (Chronic) 7/16/2021 Yes    Overview Signed 1/28/2016  1:45 PM by Riley Watson, RN     was d/c from PM for positive THC in UDS         Glaucoma, open angle 7/16/2021 Yes    Overview Signed 2/1/2016  5:19 PM by Riley Watson RN     Mild-OU         Lumbar stenosis 7/16/2021 Yes    Spondylosis 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 7/16/2021 Yes    Non-compliance 7/16/2021 Yes    Hiatal hernia 7/16/2021 Yes    Overview Signed 12/29/2016  2:04 PM by Riley Watson RN     Per EGD 12/23/16 Dr Wm Khan         Ischemic cardiomyopathy 7/16/2021 Yes    PVD (peripheral vascular disease) with claudication ([address] 7/16/2021 Yes    Chronic venous insufficiency 7/16/2021 Yes    History of non-ST elevation myocardial infarction (NSTEMI) 7/16/2021 Yes    History of stroke 7/16/2021 Yes    Overview Signed 4/3/2019  2:42 PM by Varun Campos MD     Remote, rt. cerebellum, head CT scan, 1/9/19         COPD (chronic obstructive pulmonary disease) ([address] 7/16/2021 Yes    Status post peripheral artery angioplasty 7/16/2021 Yes    Uncontrolled hypertension 7/16/2021 Yes    Acute on chronic congestive heart failure ([address] 7/16/2021 Yes        Assessment & Plan  1. Acute hypoxic respiratory failure  2. Acute COPD exacerbation  3. DAYAN  4. Tobacco Abuse  5. CAD  6. Acute on chronic HFpEF    Appreciate pulmonary and cardiology input. Continue nystatin for oral thrush. Wean oxygen. Check Xray of neck as well as knee reviewed and negative. For d/c today home.      Laweraesther Justin, DO

## 2021-07-21 NOTE — PROGRESS NOTES
CLINICAL PHARMACY NOTE: MEDS TO BEDS    Total # of Prescriptions Filled: 5   The following medications were delivered to the patient:  · Nystatin 2699040 unit   · Prednisone 10 mg  · Hydralazine 25 mg  · Ipratropium albuterol 8.5-3.5  · Bumetanide 2 mg    Additional Documentation:

## 2021-07-21 NOTE — DISCHARGE INSTR - COC
Continuity of Care Form    Patient Name: Ludy Lazo   :  1955  MRN:  [id number]    Admit date:  7/15/2021  Discharge date:  ***    Code Status Order: Full Code   Advance Directives:     Admitting Physician:  Paramjit Hendricks DO  PCP: Jacki Cisse DO    Discharging Nurse: [facility] Unit/Room#: 5923/1517-I  Discharging Unit Phone Number: ***    Emergency Contact:   Extended Emergency Contact Information  Primary Emergency Contact: Soledad Guevara  Address: [address]  Phone: [phone]  Relation: Child   needed? No  Secondary Emergency Contact: Annie Romero [facility] [address]  Phone: [phone]  Relation: Brother/Sister   needed? No    Past Surgical History:  Past Surgical History:   Procedure Laterality Date    ANGIOPLASTY  2018    Dr. Luke Light & athrectomy L SFA & Popliteal    BREAST SURGERY  2000    bilateral reduction    BRONCHIAL BRUSH BIOPSY  2013    Dr Olena Litten  2015    Dr. Christina Chaudhry  2011     DR. Frederic Mcmahon,  follows Dr Phil Patel  11/15/2013    Dr Americo Alexandra COLONOSCOPY  2016    Dr Alfa Dave adenoma & hyperplastic polyps, melanosis coli (repeat one year 2017)    CORONARY ARTERY BYPASS GRAFT      ECHO COMPLETE  10/9/2013         ENDOSCOPY, COLON, DIAGNOSTIC  2017    GALLBLADDER SURGERY      gallstones removed, [facility] 2017    HYSTERECTOMY      JOINT REPLACEMENT      LEFT KNEE    KNEE SURGERY      left knee replacement    LAYER WOUND CLOSURE Left [id number]    [facility]SY  [facility]    POLYS[facility]GRAPHY  2016    [facility]    UPPER GASTROINTESTINAL ENDOSCOPY  12    UPPER GASTROINTESTINAL ENDOSCOPY  2016    Dr Americo Alexandra UPPER GASTROINTESTINAL ENDOSCOPY  2017       Immunization History: Immunization History   Administered Date(s) Administered    COVID-19, Pfizer, PF, 30mcg/0.3mL 06/25/2021, 06/25/2021    Pneumococcal Polysaccharide ([id number]) 09/08/2020       Active Problems:  Patient Active Problem List   Diagnosis Code    Morbid obesity due to excess calories ([facility] [address] E66.01    Hyperlipidemia E78.5    DAYAN and COPD overlap syndrome ([facility]) G47.33, J44.9    Vitamin D insufficiency E55.9    Chronic combined systolic and diastolic heart failure ([facility]) I50.42    GERD (gastroesophageal reflux disease) K21.9    Major depressive disorder, recurrent episode, mild ([facility]) F33.0    Diabetic polyneuropathy associated with type 2 diabetes mellitus ([facility]) E11.42    Chronic passive hepatic congestion K76.1    Mixed incontinence urge and stress N39.46    Chronic back pain - d/t muscle spasm M54.9, G89.29    Glaucoma, open angle H40.10X0    Elevated CA 19-9 level R97.8    Lumbar stenosis M48.061    Spondylosis 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 M47.816    Lumbar disc herniation M51.26    Asymmetric septal hypertrophy ([facility]) I42.2    Non-compliance Z91.19    Hiatal hernia K44.9    Melanosis coli K63.89    Coronary artery disease involving native coronary artery of native heart without angina pectoris I25.10    DM2 (diabetes mellitus, type 2) ([facility]) E11.9    Cigarette smoker F17.210    Marijuana use, smoked F12.90    Ischemic cardiomyopathy I25.5    PVD (peripheral vascular disease) with claudication ([facility]) I73.9    Chronic venous insufficiency I87.2    History of non-ST elevation myocardial infarction (NSTEMI) I25.2    QT prolongation R94.31    History of stroke Z86.73    Chronic respiratory failure with hypoxia and hypercapnia ([facility]) J96.11, J96.12    Acute respiratory failure with hypoxia ([facility]) J96.01    COPD (chronic obstructive pulmonary disease) ([facility]) J44.9    Status post peripheral artery angioplasty Z98.62    Uncontrolled hypertension I10    Acute on chronic congestive heart failure (HCC) I50.9       Isolation/Infection:   Isolation          No Isolation        Patient Infection Status     Infection Onset Added Last Indicated Last Indicated By Review Planned Expiration Resolved Resolved By    None active    Resolved    COVID-19 Rule Out 21 Respiratory Panel, Molecular, with COVID-19 (Restricted: peds pts or suitable admitted adults) (Ordered)   21 Rule-Out Test Resulted    COVID-19 Rule Out 07/15/21 07/15/21 07/15/21 COVID-19, Rapid (Ordered)   07/15/21 Rule-Out Test Resulted    COVID-19 Rule Out 21 Respiratory Panel, Molecular, with COVID-19 (Restricted: peds pts or suitable admitted adults) (Ordered)   06/10/21 Rule-Out Test Resulted    COVID-19 21 COVID-19, Rapid   21     COVID-19 Rule Out 21 COVID-19, Rapid (Ordered)   21 Rule-Out Test Resulted    COVID-19 Rule Out 21 Respiratory Panel, Molecular, with COVID-19 (Restricted: peds pts or suitable admitted adults) (Ordered)   21 Rule-Out Test Resulted    ESBL (Extended Spectrum Beta Lactamase)  03/10/17 03/10/17 Kiana Paredes RN   17 Arvind Kerr RN    Klebsiella pneumoniae Urine 2017          Nurse Assessment:  Last Vital Signs: /78   Pulse 72   Temp 98.5 °F (36.9 °C) (Oral)   Resp 18   Ht 5' 1\" (1.549 m)   Wt 223 lb 6.4 oz (101.3 kg)   LMP 1990   SpO2 97%   BMI 42.21 kg/m²     Last documented pain score (0-10 scale): Pain Level: 2  Last Weight:   Wt Readings from Last 1 Encounters:   21 223 lb 6.4 oz (101.3 kg)     Mental Status:  {IP PT MENTAL STATUS:25668}    IV Access:  {[facility] IV ACCESS:788485601}    Nursing Mobility/ADLs:  Walking   {CHP DME DAOX:736437340}  Transfer  {CHP DME VUOS:981140195}  Bathing  {CHP DME RVFO:239857912}  Dressing  {CHP DME FKSR:196559861}  Toileting  {CHP DME XVKO:902008512}  Feeding  {CHP DME FQQT:653845784}  Med Admin  {CHP DME UOHC:665210899}  Med Delivery   { JESSI MED Delivery:644460184}    Wound Care Documentation and Therapy:        Elimination:  Continence:   · Bowel: {YES / OY:36794}  · Bladder: {YES / KB:77563}  Urinary Catheter: {Urinary Catheter:967079131}   Colostomy/Ileostomy/Ileal Conduit: {YES / WI:16867}       Date of Last BM: ***    Intake/Output Summary (Last 24 hours) at 2021 1207  Last data filed at 2021 1945  Gross per 24 hour   Intake 230 ml   Output 2000 ml   Net -1770 ml     I/O last 3 completed shifts: In: 230 [P.O.:220;  I.V.:10]  Out: 2000 [Urine:2000]    Safety Concerns:     [employer] Safety Concerns:812030095}    Impairments/Disabilities:      [employer] Impairments/Disabilities:418540832}    Nutrition Therapy:  Current Nutrition Therapy:   [employer] Diet List:613885085}    Routes of Feeding: {[facility] DME Other Feedings:072213367}  Liquids: {Slp liquid thickness:31335}  Daily Fluid Restriction: {CHP DME Yes amt example:230037335}  Last Modified Barium Swallow with Video (Video Swallowing Test): {Done Not Done RMYE:985981043}    Treatments at the Time of Hospital Discharge:   Respiratory Treatments: ***  Oxygen Therapy:  {Therapy; copd oxygen:61273}  Ventilator:    { CC Vent BOB}    Rehab Therapies: {THERAPEUTIC INTERVENTION:0533163289}  Weight Bearing Status/Restrictions: [employer] Weight Bearin}  Other Medical Equipment (for information only, NOT a DME order):  {EQUIPMENT:564064997}  Other Treatments: ***    Patient's personal belongings (please select all that are sent with patient):  {[facility] DME Belongings:812971055}    RN SIGNATURE:  {Esignature:514792658}    CASE MANAGEMENT/SOCIAL WORK SECTION    Inpatient Status Date: ***    Readmission Risk Assessment Score:  Readmission Risk              Risk of Unplanned Readmission:  39           Discharging to Facility/ Agency   · Name:   · Address:  · Phone:  · Fax:    Dialysis Facility (if applicable) · Name:  · Address:  · Dialysis Schedule:  · Phone:  · Fax:    / signature: {Esignature:229647815}    PHYSICIAN SECTION    Prognosis: {Prognosis:6330642875}    Condition at Discharge: Valentín Johns Patient Condition:614917299}    Rehab Potential (if transferring to Rehab): {Prognosis:1545716099}    Recommended Labs or Other Treatments After Discharge: ***    Physician Certification: I certify the above information and transfer of Adolph Espinosa  is necessary for the continuing treatment of the diagnosis listed and that she requires {Admit to Appropriate Level of Care:37739} for {GREATER/LESS:236965082} 30 days.      Update Admission H&P: {CHP DME Changes in NFDNE:095494108}    PHYSICIAN SIGNATURE:  {Esignature:152151711}

## 2021-07-22 ENCOUNTER — TELEPHONE (OUTPATIENT)
Dept: CARDIOLOGY CLINIC | Age: 66
End: 2021-07-22

## 2021-07-22 RX ORDER — HYDRALAZINE HYDROCHLORIDE 50 MG/1
25 TABLET, FILM COATED ORAL 3 TIMES DAILY
COMMUNITY
End: 2021-08-09

## 2021-07-22 NOTE — TELEPHONE ENCOUNTER
----- Message from Nicole Rodriguez MD sent at 7/21/2021  9:25 AM EDT -----  Patient again hospitalized with acute respiratory failure and diastolic heart failure in the face of exacerbation of chronic obstructive lung disease and chronic respiratory failure largely mediated by noncompliance.   Please schedule follow-up with Gabino within the next 7 to 10 days

## 2021-07-22 NOTE — TELEPHONE ENCOUNTER
Ask her to hold hydralazine if the blood pressure is less than 110 mmHg. Decrease hydralazine to 50 mg p.o. 3 times a day.

## 2021-07-22 NOTE — TELEPHONE ENCOUNTER
Upon review of chart, F/U already scheduled for 7/30/21 at 9:00 a.m. Patient notified to keep this appointment.

## 2021-07-22 NOTE — TELEPHONE ENCOUNTER
Patient called stating when she left the hospital her BP was 105/43. Today her BP is 88/54 in the right arm and 85/43 in the left arm. She is feeling dizzy and lightheaded. She states her Hydralazine and water pill were increased in the hospital.  Please advise.

## 2021-07-25 NOTE — DISCHARGE SUMMARY
Discharge Summary     Patient ID:  Ludy Mckeon  [id number]  76 y.o. 1955 female  Edna Rowland DO        Admit date: 7/15/2021    Discharge date :  7/21/2021      Activity level: As tolerated  Diet: Cardiac  Labs: None  Disposition:Home  Condition on Discharge:Stable  DME: Nebulizer    Admit Diagnoses:   Patient Active Problem List   Diagnosis    Morbid obesity due to excess calories ([facility] [address]    Hyperlipidemia    DAYAN and COPD overlap syndrome ([facility] [address]    Vitamin D insufficiency    Chronic combined systolic and diastolic heart failure (HCC)    GERD (gastroesophageal reflux disease)    Major depressive disorder, recurrent episode, mild ([facility] [address]    Diabetic polyneuropathy associated with type 2 diabetes mellitus (HCC)    Chronic passive hepatic congestion    Mixed incontinence urge and stress    Chronic back pain - d/t muscle spasm    Glaucoma, open angle    Elevated CA 19-9 level    Lumbar stenosis    Spondylosis 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    Lumbar disc herniation    Asymmetric septal hypertrophy (HCC)    Non-compliance    Hiatal hernia    Melanosis coli    Coronary artery disease involving native coronary artery of native heart without angina pectoris    DM2 (diabetes mellitus, type 2) ([facility] [address]    Cigarette smoker    Marijuana use, smoked    Ischemic cardiomyopathy    PVD (peripheral vascular disease) with claudication ([facility] [address]    Chronic venous insufficiency    History of non-ST elevation myocardial infarction (NSTEMI)    QT prolongation    History of stroke    Chronic respiratory failure with hypoxia and hypercapnia (HCC)    Acute respiratory failure with hypoxia (HCC)    COPD (chronic obstructive pulmonary disease) (HCC)    Status post peripheral artery angioplasty    Uncontrolled hypertension    Acute on chronic congestive heart failure ([facility] [address]       Discharge Diagnoses: Principal Problem:    Acute respiratory failure with hypoxia (HCC)  Active Problems:    Chronic combined systolic and diastolic heart failure (HCC)    Coronary artery disease involving native coronary artery of native heart without angina pectoris    Chronic respiratory failure with hypoxia and hypercapnia (HCC)    DAYAN and COPD overlap syndrome (HCC)    DM2 (diabetes mellitus, type 2) ([facility] [address]    Morbid obesity due to excess calories (HCC)    Cigarette smoker    Hyperlipidemia    Vitamin D insufficiency    GERD (gastroesophageal reflux disease)    Major depressive disorder, recurrent episode, mild (HCC)    Diabetic polyneuropathy associated with type 2 diabetes mellitus (HCC)    Chronic passive hepatic congestion    Chronic back pain - d/t muscle spasm    Glaucoma, open angle    Lumbar stenosis    Spondylosis 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    Non-compliance    Hiatal hernia    Ischemic cardiomyopathy    PVD (peripheral vascular disease) with claudication ([facility])    Chronic venous insufficiency    History of non-ST elevation myocardial infarction (NSTEMI)    History of stroke    COPD (chronic obstructive pulmonary disease) ([facility] [address]    Status post peripheral artery angioplasty    Uncontrolled hypertension    Ac[facility] on chronic [facility]  Resolved Problems:    Essential hypertension  1. Acute hypoxic respiratory failure   2. Acute COPD exacerbation   3. DAYAN   4. Tobacco Abuse   5. CAD   6. Acute on chronic HFpEF      Consults:  IP CONSULT TO INTERNAL MEDICINE  IP CONSULT TO SOCIAL WORK  IP CONSULT TO PULMONOLOGY  IP CONSULT TO CARDIOLOGY    Procedures: None    Hospital Course: Lisa Germain is a 76y.o. year old female. She presented to the hospital the chief complaint of hypoxia. She states that she had Covid in April. She was requiring some oxygen since then. She was discharged and went home. In June she had a low oxygen level and was treated for bronchitis.  She saw her PCP yesterday for the first time and she had a low oxygen level and was sent to the hospital. She is still weak. She is short of breath. She has pain in her legs and back. She does not wear oxygen at home. Exertion makes her symptoms worse. Nothing took makes her symptoms better. She does wear trilogy at night with oxygen bleed in. Patient was found to be in both acute COPD exacerbation and acute chf. She was seen by cardiology and pulmonary. Her breathing improved. She was weaned from oxygen. Patient was discharged home in stable condition. Discharge Exam:  General Appearance: Comfortable, well-appearing and in no acute distress. Vital signs: (most recent): Blood pressure 132/78, pulse 89, temperature 98.2 °F (36.8 °C), temperature source Axillary, resp. rate 17, height 5' 1\" (1.549 m), weight 223 lb 6.4 oz (101.3 kg), last menstrual period 01/01/1990, SpO2 94 %, not currently breastfeeding. Lungs: Normal effort and normal respiratory rate. There are decreased breath sounds. No rales or rhonchi. Heart: Normal rate. Regular rhythm. S1 normal and S2 normal. No friction rub. Abdomen: Abdomen is soft and non-distended. Bowel sounds are normal. There is no abdominal tenderness. There is no rebound tenderness. There is no guarding. Extremities: Normal range of motion. There is no dependent edema. Skin: Warm and dry. No intake/output data recorded. No intake/output data recorded. Imaging:  XR FOOT RIGHT (2 VIEWS)    Result Date: 7/15/2021  EXAMINATION: TWO XRAY VIEWS OF THE RIGHT FOOT 7/15/2021 2:51 pm COMPARISON: None. HISTORY: ORDERING SYSTEM PROVIDED HISTORY: Dropped heavy object on top of right foot, low concern for fracture TECHNOLOGIST PROVIDED HISTORY: Reason for exam:->Dropped heavy object on top of right foot, low concern for fracture FINDINGS: There is no evidence of acute fracture. There is normal alignment of the tarsometatarsal joints. No acute joint abnormality. No focal osseous lesion. There is soft tissue swelling in the distal foot. Calcaneal spur noted.      No acute osseous abnormality. Soft tissue swelling. XR CHEST PORTABLE    Result Date: 7/15/2021  EXAMINATION: ONE XRAY VIEW OF THE CHEST 7/15/2021 2:51 pm COMPARISON: Chest radiograph June 9, 2021 HISTORY: ORDERING SYSTEM PROVIDED HISTORY: SOB, productive cough, history of COPD and CHF TECHNOLOGIST PROVIDED HISTORY: Reason for exam:->SOB, productive cough, history of COPD and CHF FINDINGS: Trachea is midline. Cardiac silhouette is enlarged but stable in size. Bilateral airspace opacities seen most prominent in the lower lungs. There is blunting of the left costophrenic angle. No pneumothorax. Opacities may represent moderate pulmonary edema. Multifocal pneumonia may also be considered in the proper clinical setting. Small left-sided pleural effusion. Patient Instructions:   Discharge Medication List as of 7/21/2021 12:36 PM      START taking these medications    Details   predniSONE (DELTASONE) 10 MG tablet 30 mg oral daily for 3 days   Then 20 mg oral daily for 3 days  Then 10 mg oral daily for 3 days then off., Disp-18 tablet, R-0Normal      lidocaine 4 % external patch Place 1 patch onto the skin daily, Transdermal, DAILY Starting Wed 7/21/2021, Disp-1 box, R-0, Normal      nystatin (MYCOSTATIN) 594943 UNIT/ML suspension Take 5 mLs by mouth 4 times daily, Oral, 4 TIMES DAILY Starting Wed 7/21/2021, Disp-1 Bottle, R-0, Normal      Arformoterol Tartrate (BROVANA) 15 MCG/2ML NEBU Take 2 mLs by nebulization 2 times daily, Disp-120 mL, R-3Normal      ipratropium-albuterol (DUONEB) 0.5-2.5 (3) MG/3ML SOLN nebulizer solution Inhale 3 mLs into the lungs every 4 hours (while awake), Disp-360 mL, R-0Normal      Respiratory Therapy Supplies (FULL KIT NEBULIZER SET) MISC Disp-1 each, R-0, PrintUse as directed with nebulized medication.          CONTINUE these medications which have CHANGED    Details   bumetanide (BUMEX) 2 MG tablet Take 1 tablet by mouth daily, Disp-30 tablet, R-3Normal      hydrALAZINE (APRESOLINE) 25 MG tablet Take 3 tablets by mouth every 8 hours, Disp-90 tablet, R-3Normal         CONTINUE these medications which have NOT CHANGED    Details   acetaminophen (TYLENOL) 325 MG tablet TAKE 1 TABLET BY MOUTH EVERY 6 HOURS AS NEEDED FOR PAINHistorical Med      pantoprazole (PROTONIX) 40 MG tablet TAKE 1 TABLET BY MOUTH EVERY DAYHistorical Med      TRULANCE 3 MG TABS TAKE (1) TABLET BY MOUTH EVERY DAY, DAWHistorical Med      guaiFENesin 400 MG tablet Take 1 tablet by mouth 2 times daily as needed for Cough, Disp-60 tablet, R-2Normal      aspirin (ASPIRIN LOW DOSE) 81 MG chewable tablet TAKE 1 TABLET BY MOUTH EVERY DAY, Disp-90 tablet, R-1Normal      miconazole (MICOTIN) 2 % powder Apply topically 2 times daily. , Disp-45 g, R-1, Normal      sacubitril-valsartan (ENTRESTO) 49-51 MG per tablet Take 1 tablet by mouth 2 times daily, Disp-60 tablet, R-0Normal      carvedilol (COREG) 25 MG tablet Take 1 tablet by mouth 2 times daily (with meals), Disp-60 tablet, R-3Normal      DULoxetine (CYMBALTA) 60 MG extended release capsule TAKE ONE (1) CAPSULE BY MOUTH TWICE DAILY, Disp-60 capsule, R-1Normal      montelukast (SINGULAIR) 10 MG tablet TAKE (1) TABLET BY MOUTH NIGHTLY, Disp-30 tablet, R-1Normal      fluticasone (FLONASE) 50 MCG/ACT nasal spray USE 1 SPRAY IN EACH NOSTRIL TWICE A DAY, Disp-1 Bottle, R-1Normal      MYRBETRIQ 50 MG TB24 TAKE 1 TABLET BY MOUTH EVERY DAY, Disp-90 tablet, R-1Normal      metoclopramide (REGLAN) 5 MG tablet Take 1 tablet by mouth 3 times daily, Disp-120 tablet, R-3Historical Med      gabapentin (NEURONTIN) 100 MG capsule Take 1 capsule by mouth 2 times daily for 180 days.  Intended supply: 30 days, Disp-60 capsule, R-5Normal      Blood Pressure Monitoring (B-D ASSURE BPM/AUTO WRIST CUFF) MISC Disp-1 each, R-0, PrintUse daily      Acetaminophen (TYLENOL) 325 MG CAPS Take 325 mg by mouth every 6 hours as needed (pain), Disp-30 capsule, R-0Normal      insulin lispro, 1 Unit Dial, (HUMALOG KWIKPEN) 100 UNIT/ML SOPN Use sliding scale as below TID with meals Sugar  0 units Sugar 131-180 2 units Sugar 181-240 4 units Sugar 241-300 6 units Sugar 301-350 8 units Sugar 351-400 10 units Sugar > 400 12 units Call above 500, Disp-5 pen, R-2Normal      vitamin D3 (CHOLECALCIFEROL) 25 MCG (1000 UT) TABS tablet TAKE 1 TABLET BY MOUTH EVERY DAY, Disp-90 tablet, R-1Normal      Insulin Degludec (TRESIBA FLEXTOUCH) 200 UNIT/ML SOPN INJECT 50 UNITS INTO THE SKIN nightly, Disp-13 pen, R-2PATIENT REQUESTING REFILLNormal      cetirizine (ZYRTEC) 10 MG tablet TAKE 1 TABLET BY MOUTH EVERY DAY, Disp-90 tablet, R-1Normal      baclofen (LIORESAL) 10 MG tablet TAKE 1 TABLET BY MOUTH TWICE A DAY AS NEEDED, Disp-180 tablet, R-1Normal      spironolactone (ALDACTONE) 25 MG tablet Take 1 tablet by mouth daily, Disp-30 tablet, R-11Normal      docusate sodium (COLACE) 100 MG capsule TAKE 2 CAPSULES BY MOUTH AT BEDTIMEHistorical Med      Alcohol Swabs ([employer] ALCOHOL PREP SWABS) 70 % PADS Disp-1 each, R-2, NormalUse daily      blood glucose test strips (ACCU-CHEK GUIDE) strip 1 each by In Vitro route 3 times daily, Disp-300 each, R-11Normal      Accu-Chek FastClix Lancets MISC Disp-100 each, R-2, NormalUse 4x daily      SPIRIVA RESPIMAT 1.25 MCG/ACT AERS inhaler INHALE TWO (2) PUFFS BY MOUTH EVERY DAY, Disp-1 Inhaler,R-5,DAWNormal      isosorbide mononitrate (IMDUR) 60 MG extended release tablet TAKE (1) TABLET BY MOUTH DAILY, Disp-90 tablet,R-3Normal      traZODone (DESYREL) 100 MG tablet TAKE (1) TABLET BY MOUTH NIGHTLY, Disp-30 tablet,R-10Normal      simvastatin (ZOCOR) 20 MG tablet TAKE 1 TABLET BY MOUTH NIGHTLY, Disp-90 tablet,R-3Normal      azelastine (ASTELIN) 0.1 % nasal spray 1 spray by Nasal route 2 times daily Use in each nostril as directed, Disp-1 Bottle,R-3Normal      Handicap Placard [facility] Starting Thu 7/30/2020, Disp-1 each,R-0, PrintPatient cannot walk 200 ft without stopping to rest.   Expiration 8/2022 Blood Glucose Monitoring Suppl (ACCU-CHEK GUIDE ME) w/Device KIT Use as directed, Disp-1 kit,R-0Normal      BiPAP Machine MISC NIGHTLY, Historical Med27/23          STOP taking these medications       ibuprofen (ADVIL;MOTRIN) 600 MG tablet Comments:   Reason for Stopping:                 Note that more than 30 minutes was spent in preparing discharge papers, discussing discharge with patient, medication review, etc.      Signed:    Federico Turner DO    Electronically signed by Federico Turner DO on 7/24/2021 at 9:12 PM

## 2021-07-30 ENCOUNTER — OFFICE VISIT (OUTPATIENT)
Dept: CARDIOLOGY CLINIC | Age: 66
End: 2021-07-30
Payer: MEDICAID

## 2021-07-30 VITALS
WEIGHT: 225.1 LBS | SYSTOLIC BLOOD PRESSURE: 128 MMHG | DIASTOLIC BLOOD PRESSURE: 72 MMHG | HEART RATE: 103 BPM | HEIGHT: 62 IN | BODY MASS INDEX: 41.42 KG/M2 | RESPIRATION RATE: 18 BRPM

## 2021-07-30 DIAGNOSIS — I50.42 CHRONIC COMBINED SYSTOLIC AND DIASTOLIC HEART FAILURE (HCC): Primary | ICD-10-CM

## 2021-07-30 PROCEDURE — 1090F PRES/ABSN URINE INCON ASSESS: CPT | Performed by: INTERNAL MEDICINE

## 2021-07-30 PROCEDURE — 1123F ACP DISCUSS/DSCN MKR DOCD: CPT | Performed by: INTERNAL MEDICINE

## 2021-07-30 PROCEDURE — G8399 PT W/DXA RESULTS DOCUMENT: HCPCS | Performed by: INTERNAL MEDICINE

## 2021-07-30 PROCEDURE — 93000 ELECTROCARDIOGRAM COMPLETE: CPT | Performed by: INTERNAL MEDICINE

## 2021-07-30 PROCEDURE — 99214 OFFICE O/P EST MOD 30 MIN: CPT | Performed by: INTERNAL MEDICINE

## 2021-07-30 PROCEDURE — G8427 DOCREV CUR MEDS BY ELIG CLIN: HCPCS | Performed by: INTERNAL MEDICINE

## 2021-07-30 PROCEDURE — 1111F DSCHRG MED/CURRENT MED MERGE: CPT | Performed by: INTERNAL MEDICINE

## 2021-07-30 PROCEDURE — 4040F PNEUMOC VAC/ADMIN/RCVD: CPT | Performed by: INTERNAL MEDICINE

## 2021-07-30 PROCEDURE — 4004F PT TOBACCO SCREEN RCVD TLK: CPT | Performed by: INTERNAL MEDICINE

## 2021-07-30 PROCEDURE — G8417 CALC BMI ABV UP PARAM F/U: HCPCS | Performed by: INTERNAL MEDICINE

## 2021-07-30 PROCEDURE — 3017F COLORECTAL CA SCREEN DOC REV: CPT | Performed by: INTERNAL MEDICINE

## 2021-07-30 RX ORDER — SACUBITRIL AND VALSARTAN 24; 26 MG/1; MG/1
1 TABLET, FILM COATED ORAL 2 TIMES DAILY
COMMUNITY
End: 2021-07-30 | Stop reason: ALTCHOICE

## 2021-07-30 RX ORDER — METOPROLOL SUCCINATE 25 MG/1
25 TABLET, EXTENDED RELEASE ORAL 2 TIMES DAILY
COMMUNITY
End: 2021-07-30 | Stop reason: CLARIF

## 2021-07-30 RX ORDER — SACUBITRIL AND VALSARTAN 49; 51 MG/1; MG/1
1 TABLET, FILM COATED ORAL 2 TIMES DAILY
Qty: 60 TABLET | Refills: 3 | Status: SHIPPED
Start: 2021-07-30 | End: 2021-09-15 | Stop reason: SDUPTHER

## 2021-07-30 NOTE — PROGRESS NOTES
OUTPATIENT CARDIOLOGY FOLLOW-UP    Name: Bev Arshad    Age: 76 y.o. Primary Care Physician: Miley Rivera DO    Date of Service: 7/30/2021    Chief Complaint:   No chief complaint on file. Interim History:  Mrs. Leny Lockett is a 77-year-old  female with history of for coronary artery disease underwent CABG in 2011 and history of severe LV dysfunction with an EF of 25% based on echocardiogram done in 2013, cardiac cath in 2013 showed no progression of disease, chronic right bundle-branch block, type II diabetes, hypertension, hyperlipidemia, morbid obesity, active tobacco use still smoking about 10 cigarettes a day, marijuana use, obstructive sleep apnea on CPAP and history of severe peripheral vascular disease came for follow-up visit. She was last seen in the office was on 5/20/2021, Since her last evaluation, she was recently hospitalized from 7/15/2021 to 7/21/2021 with acute hypoxic respiratory failure secondary to acute COPD exacerbation and acute on chronic HFpEF. Since she was discharged from the hospital she is feeling much better however, she still smoking marijuana. She denies any significant leg edema. She is still confused about her medications. She was discharged home on Entresto 49/51 p.o. twice daily. She ran out of the prescription now she is taking only 24/26 mg p.o. twice daily. she was hospitalized from 4/19/2021 to 4/25/2021 with a COVID-19 pneumonia and was treated with steroids and antibiotics. .  She underwent left popliteal and tibial trunk atherectomy on 11/13/2018 and was initiated on plavix and aspirin. She was discharged home on oxygen and she has been using mainly at nighttime. She denies any chest pain, increased dyspnea or leg edema. She is still smoking about 5 cigarettes a day and does not want to quit. She told me her blood pressure sometimes running high at home. She has been using wrist blood pressure machine.   She was was recommended to take Coreg 12.5 mg twice daily but she has been taking 25 mg in the evening and 50 mg in the morning. She denies any dizziness, orthopnea, paroxysmal nocturnal dyspnea or leg edema. She gets cough and congestion whenever she goes out. She has multiple allergies to grass and weeds. She denies any fever or chills. No palpitations, chest pain, syncope or presyncope. She is complaint with her medications and denies taking any over-the-counter medications. She denies using any other illicit drugs other than marijuana. She told me that she is complaint with her salt and fluid intake. SR on EKG.     Review of Systems:   Cardiac: As per HPI  General: No fever, chills  Pulmonary: As per HPI  HEENT: No visual disturbances, difficult swallowing  GI: No nausea, vomiting  Endocrine: No thyroid disease or DM  Musculoskeletal: PEDROZA x 4, no focal motor deficits  Skin: Intact, no rashes  Neuro/Psych: No headache or seizures    Past Medical History:  Past Medical History:   Diagnosis Date    Asthma     Atherosclerosis of native artery of left leg with rest pain ([address] 11/9/2018    C. difficile diarrhea 2015    CAD (coronary artery disease) 2011    Cellulitis and abscess of trunk 4/14/2015    Cerebellar infarct ([address] 4/3/2019    Remote, rt. cerebellum, head CT scan, 1/9/19    Chronic back pain     Chronic kidney disease     Chronic systolic congestive heart failure ([address] 10/4/2013    Chronic venous insufficiency 10/11/2017    COPD (chronic obstructive pulmonary disease) ([address]     Depression     Diabetes mellitus ([address]     Diabetic neuropathy ([address]     Diverticulosis     Fatty liver 1/8/2016    per US    Glaucoma, open angle 2/1/2016    Mild-OU    Hepatic encephalopathy ([address] 02/07/2016    resolved    Hiatal hernia     History of blood transfusion     Hyperlipidemia     Hyperplastic colon polyp     Hypertension     Incontinence     Liver mass     Morbid obesity ([address]     Movement disorder     Myocardial infarction [facility]) 2011    Osteoarthritis, generalized     Pain in both lower legs 10/11/2017    [facility]ipheral vascular [facility]     Pneumonia 1/26/2014    Pulmonary edema     resolved    PVD (peripheral vascular disease) with claudication ([facility] 8/30/2017    Sleep apnea     uses BI PAP    Status post peripheral artery angioplasty 10/31/2019    Tobacco abuse     Tobacco abuse 10/11/2017    Tubular adenoma polyp of rectum     Urinary tract infection due to ESBL Klebsiella 03/08/2017    Ventral hernia        Past Surgical History:  Past Surgical History:   Procedure Laterality Date    ANGIOPLASTY  11/13/2018    Dr. Valeriano Lora & athrectomy L SFA & Popliteal    BREAST SURGERY  2000    bilateral reduction    BRONCHIAL BRUSH BIOPSY  1/25/2013    Dr Umu Reinoso  04/20/2015    Dr. Ryley Mayorga  12/2011     DR. Vincent Lozano,  follows Dr Cony Lemus  11/15/2013    Dr Maco Guardado    COLONOSCOPY  12/23/2016    Dr Kaylie Tejada adenoma & hyperplastic polyps, melanosis coli (repeat one year 12/2017)    CORONARY ARTERY BYPASS GRAFT      ECHO COMPLETE  10/9/2013         ENDOSCOPY, COLON, DIAGNOSTIC  04/18/2017    GALLBLADDER SURGERY      gallstones removed, [facility] 8/2017    HYSTERECTOMY      JOINT REPLACEMENT  2011    LEFT KNEE    KNEE SURGERY      left knee replacement    LAYER WOUND CLOSURE Left [id number]   Keesha Her LIVER BIOPSY  1/8/2016    [facility]    POLYSOMNOGRAPHY  1/2016    [facility]    UPPER GASTROINTESTINAL ENDOSCOPY  12/20/12    UPPER GASTROINTESTINAL ENDOSCOPY  12/23/2016    Dr Manju Zarco UPPER GASTROINTESTINAL ENDOSCOPY  02/08/2017       Family History:  Family History   Problem Relation Age of Onset    Cancer Mother     Asthma Father        Social History:  Social History     Socioeconomic History    Marital status:       Spouse name: Not on file    Number of children: 2    Years of education: Not on file    Lasix [Furosemide] Other (See Comments)     Pt states she cramps up and gets headaches    Levaquin [Levofloxacin In D5w] Hives    Lipitor      MUSCLE SPASMS    Lyrica [Pregabalin]      Dream disturbances    Morphine Hives    Naproxen      Unsure of reaction;pt states able to take Aleve without difficulties    Nefazodone Other (See Comments)    Norvasc [Amlodipine] Swelling    Oxycodone-Acetaminophen Swelling    Shellfish-Derived Products     Trazodone And Nefazodone        Current Medications:  Current Outpatient Medications   Medication Sig Dispense Refill    hydrALAZINE (APRESOLINE) 50 MG tablet Take 50 mg by mouth 3 times daily Hold if systolic is below 302      predniSONE (DELTASONE) 10 MG tablet 30 mg oral daily for 3 days   Then 20 mg oral daily for 3 days  Then 10 mg oral daily for 3 days then off. 18 tablet 0    lidocaine 4 % external patch Place 1 patch onto the skin daily 1 box 0    bumetanide (BUMEX) 2 MG tablet Take 1 tablet by mouth daily 30 tablet 3    nystatin (MYCOSTATIN) 005753 UNIT/ML suspension Take 5 mLs by mouth 4 times daily 1 Bottle 0    Arformoterol Tartrate (BROVANA) 15 MCG/2ML NEBU Take 2 mLs by nebulization 2 times daily 120 mL 3    ipratropium-albuterol (DUONEB) 0.5-2.5 (3) MG/3ML SOLN nebulizer solution Inhale 3 mLs into the lungs every 4 hours (while awake) 360 mL 0    Respiratory Therapy Supplies (FULL KIT NEBULIZER SET) MISC Use as directed with nebulized medication. 1 each 0    acetaminophen (TYLENOL) 325 MG tablet TAKE 1 TABLET BY MOUTH EVERY 6 HOURS AS NEEDED FOR PAIN      pantoprazole (PROTONIX) 40 MG tablet TAKE 1 TABLET BY MOUTH EVERY DAY      TRULANCE 3 MG TABS TAKE (1) TABLET BY MOUTH EVERY DAY      guaiFENesin 400 MG tablet Take 1 tablet by mouth 2 times daily as needed for Cough 60 tablet 2    aspirin (ASPIRIN LOW DOSE) 81 MG chewable tablet TAKE 1 TABLET BY MOUTH EVERY DAY 90 tablet 1    miconazole (MICOTIN) 2 % powder Apply topically 2 times daily. 45 g 1    sacubitril-valsartan (ENTRESTO) 49-51 MG per tablet Take 1 tablet by mouth 2 times daily 60 tablet 0    carvedilol (COREG) 25 MG tablet Take 1 tablet by mouth 2 times daily (with meals) 60 tablet 3    DULoxetine (CYMBALTA) 60 MG extended release capsule TAKE ONE (1) CAPSULE BY MOUTH TWICE DAILY 60 capsule 1    montelukast (SINGULAIR) 10 MG tablet TAKE (1) TABLET BY MOUTH NIGHTLY 30 tablet 1    fluticasone (FLONASE) 50 MCG/ACT nasal spray USE 1 SPRAY IN EACH NOSTRIL TWICE A DAY 1 Bottle 1    MYRBETRIQ 50 MG TB24 TAKE 1 TABLET BY MOUTH EVERY DAY 90 tablet 1    metoclopramide (REGLAN) 5 MG tablet Take 1 tablet by mouth 3 times daily 120 tablet 3    gabapentin (NEURONTIN) 100 MG capsule Take 1 capsule by mouth 2 times daily for 180 days.  Intended supply: 30 days 60 capsule 5    Blood Pressure Monitoring (B-D ASSURE BPM/AUTO WRIST CUFF) MISC Use daily 1 each 0    Acetaminophen (TYLENOL) 325 MG CAPS Take 325 mg by mouth every 6 hours as needed (pain) 30 capsule 0    insulin lispro, 1 Unit Dial, (HUMALOG KWIKPEN) 100 UNIT/ML SOPN Use sliding scale as below TID with meals Sugar  0 units Sugar 131-180 2 units Sugar 181-240 4 units Sugar 241-300 6 units Sugar 301-350 8 units Sugar 351-400 10 units Sugar > 400 12 units Call above 500 5 pen 2    vitamin D3 (CHOLECALCIFEROL) 25 MCG (1000 UT) TABS tablet TAKE 1 TABLET BY MOUTH EVERY DAY 90 tablet 1    Insulin Degludec (TRESIBA FLEXTOUCH) 200 UNIT/ML SOPN INJECT 50 UNITS INTO THE SKIN nightly 13 pen 2    cetirizine (ZYRTEC) 10 MG tablet TAKE 1 TABLET BY MOUTH EVERY DAY 90 tablet 1    baclofen (LIORESAL) 10 MG tablet TAKE 1 TABLET BY MOUTH TWICE A DAY AS NEEDED (Patient taking differently: Take 10 mg by mouth 2 times daily as needed ) 180 tablet 1    spironolactone (ALDACTONE) 25 MG tablet Take 1 tablet by mouth daily 30 tablet 11    docusate sodium (COLACE) 100 MG capsule TAKE 2 CAPSULES BY MOUTH AT BEDTIME      Alcohol Swabs ([employer] ALCOHOL PREP SWABS) 70 % PADS Use daily 1 each 2    blood glucose test strips (ACCU-CHEK GUIDE) strip 1 each by In Vitro route 3 times daily 300 each 11    Accu-Chek FastClix Lancets MISC Use 4x daily 100 each 2    SPIRIVA RESPIMAT 1.25 MCG/ACT AERS inhaler INHALE TWO (2) PUFFS BY MOUTH EVERY DAY 1 Inhaler 5    isosorbide mononitrate (IMDUR) 60 MG extended release tablet TAKE (1) TABLET BY MOUTH DAILY 90 tablet 3    traZODone (DESYREL) 100 MG tablet TAKE (1) TABLET BY MOUTH NIGHTLY 30 tablet 10    simvastatin (ZOCOR) 20 MG tablet TAKE 1 TABLET BY MOUTH NIGHTLY 90 tablet 3    azelastine (ASTELIN) 0.1 % nasal spray 1 spray by Nasal route 2 times daily Use in each nostril as directed 1 Bottle 3    Handicap Placard MISC by Does not apply route Patient cannot walk 200 ft without stopping to rest.    Expiration 8/2022 1 each 0    Blood Glucose Monitoring Suppl (ACCU-CHEK GUIDE ME) w/Device KIT Use as directed 1 kit 0    BiPAP Machine MISC by Does not apply route nightly 27/23       No current facility-administered medications for this visit. Physical Exam:  LMP 01/01/1990   Wt Readings from Last 3 Encounters:   07/21/21 223 lb 6.4 oz (101.3 kg)   06/13/21 229 lb (103.9 kg)   05/20/21 219 lb (99.3 kg)     Appearance: Awake, alert and oriented x 3, no acute respiratory distress she is morbidly obese. Skin: Intact, no rash  Head: Normocephalic, atraumatic  Eyes: EOMI, no conjunctival erythema  ENMT: No pharyngeal erythema, MMM, no rhinorrhea  Neck: Supple, no elevated JVP, no carotid bruits  Lungs: Clear to auscultation bilaterally. No wheezes, rales, or rhonchi.   Cardiac: Regular rate and rhythm, +S1S2, no murmurs apparent  Abdomen: Soft, nontender, +bowel sounds  Extremities: Moves all extremities x 4, trace lower extremity edema  Neurologic: No focal motor deficits apparent, normal mood and affect, alert and oriented x 3  Peripheral Pulses: Intact posterior tibial pulses bilaterally    Laboratory Tests:  Lab Results Component Value Date    CREATININE 1.2 (H) 07/20/2021    BUN 32 (H) 07/20/2021     07/20/2021    K 4.1 07/20/2021    CL 89 (L) 07/20/2021    CO2 40 (H) 07/20/2021     Lab Results   Component Value Date    MG 2.0 04/13/2019     Lab Results   Component Value Date    WBC 8.2 07/20/2021    HGB 12.2 07/20/2021    HCT 40.1 07/20/2021    MCV 95.9 07/20/2021     07/20/2021     Lab Results   Component Value Date    ALT 15 07/20/2021    AST 9 07/20/2021    ALKPHOS 97 07/20/2021    BILITOT 0.3 07/20/2021     Lab Results   Component Value Date    CKTOTAL 88 01/05/2016    CKMB 1.2 01/05/2016    TROPONINI <0.01 04/19/2021    TROPONINI <0.01 03/12/2021    TROPONINI <0.01 09/19/2019     Lab Results   Component Value Date    INR 1.1 04/19/2021    INR 1.0 01/04/2019    INR 1.1 11/13/2018    PROTIME 12.6 (H) 04/19/2021    PROTIME 11.7 01/04/2019    PROTIME 12.3 11/13/2018     Lab Results   Component Value Date    TSH 0.646 02/05/2021     Lab Results   Component Value Date    LABA1C 6.9 (H) 07/16/2021     No results found for: EAG  Lab Results   Component Value Date    CHOL 131 02/05/2021    CHOL 162 06/16/2020    CHOL 222 (H) 06/05/2018     Lab Results   Component Value Date    TRIG 219 (H) 02/05/2021    TRIG 89 06/16/2020    TRIG 84 06/05/2018     Lab Results   Component Value Date    HDL 27 02/05/2021    HDL 46 06/16/2020    HDL 38 06/05/2018     Lab Results   Component Value Date    LDLCALC 60 02/05/2021    LDLCALC 98 06/16/2020    LDLCALC 167 (H) 06/05/2018     Lab Results   Component Value Date    LABVLDL 44 02/05/2021    LABVLDL 18 06/16/2020    LABVLDL 17 06/05/2018     No results found for: CHOLHDLRATIO    Cardiac Tests:  ECG: Normal sinus rhythm, RBBB abnormal ECG        Echocardiogram: 1/4/2017  LVEF 35-45%. LV diffuse hypokinesis,mild MR  TTE- 6/5/2018:LVEF 65%, moderate LVH, stage II diastolic dysfunction,    RSQ-3/8/5135-[id number] 65%, moderate LVH, stage II diastolic dysfunction,    Stress test: 6/05/2018:  1. Pharmacologically-induced perfusion defect involving the lateral   wall, without evidence of reversibility. 2. Diffusely hypokinetic left ventricular wall motion with a focal   area of akinesia involving the mid lateral wall. 3. Left ventricular ejection fraction of 42 %       The 10-year ASCVD risk score (Bg Guerrero, et al., 2013) is: 21.5%    Values used to calculate the score:      Age: 76 years      Sex: Female      Is Non- : Yes      Diabetic: Yes      Tobacco smoker: Yes      Systolic Blood Pressure: 040 mmHg      Is BP treated: Yes      HDL Cholesterol: 27 mg/dL      Total Cholesterol: 131 mg/dL  [facility]-4/20/2015:  IMPRESSION:   1. Atherosclerotic coronary disease. a. Status post remote bypass grafting. Patent SVG to OM circumflex. b.    Esteban Dario to selectively cannulate left internal mammary artery      graft, but there is no  significant disease in the LAD itself. 2.    Moderate global left ventricular systolic dysfunction. ASSESSMENT:  · Heart failure with an improved ejection fraction with an EF of 40-45%-->65%  · ACC/AHA, stage C., NYHA functional class II, now euvolemic. Diastolic dysfunction   · CAD, status post CABG in 2011 LIMA to LAD, SVG to OM 1  · Morbid obesity with obstructive sleep apnea on Trilogy , unable to tolerate C-pap  · Hypertension-stable  · Hyperlipidemia on statin  · Type II diabetes  · Tobacco and marijuana use, still smoking about 5 to 6 cigarettes a day. · PAD, S/p left popliteal and tibial atherectomy/angioplasty -stable  · Chronic RBBB.  · H/o COVID-19 pneumonia, recovered  · COPD secondary tobacco use disorder    Plan:   1. She is on guideline directed medical therapy for heart failure with reduced ejection fraction, restart Entresto 49/51 mg  p.o. twice daily and Coreg  25 mg p.o. twice daily  2. We will decrease Bumex to Bumex to 1 mg po daily due to increased urination.   3. She was strongly encouraged to adhere to strict cardiac diet and restrict daily salt intake to less than 2 g  4. Continue rest of the medications including hydralazine 25 mg 3 times a day and continue Imdur 60 mg p.o. daily. 5. She was advised again to stop smoking and told me that she does not want to quit. 6. Follow up in one 1 month. The patient's current medication list, allergies, problem list and results of all previously ordered testing were reviewed at today's visit.   Melissa Phillips MD  [facility]) Cardiology

## 2021-08-09 RX ORDER — HYDRALAZINE HYDROCHLORIDE 25 MG/1
25 TABLET, FILM COATED ORAL 3 TIMES DAILY
Qty: 270 TABLET | Refills: 3 | Status: SHIPPED
Start: 2021-08-09 | End: 2021-08-27 | Stop reason: DRUGHIGH

## 2021-08-09 RX ORDER — HYDRALAZINE HYDROCHLORIDE 25 MG/1
25 TABLET, FILM COATED ORAL 3 TIMES DAILY
COMMUNITY
End: 2021-08-09 | Stop reason: SDUPTHER

## 2021-08-20 RX ORDER — DULOXETIN HYDROCHLORIDE 60 MG/1
60 CAPSULE, DELAYED RELEASE ORAL 2 TIMES DAILY
Qty: 60 CAPSULE | Refills: 1 | Status: CANCELLED | OUTPATIENT
Start: 2021-08-20

## 2021-08-23 ENCOUNTER — TELEPHONE (OUTPATIENT)
Dept: ENT CLINIC | Age: 66
End: 2021-08-23

## 2021-08-23 NOTE — TELEPHONE ENCOUNTER
Spoke with patient regarding current symptoms. Due to no soon availability patient is going to see her PCP regarding symptoms and will call the office if she still needs to be seen sooner.     Electronically signed by Isauro Jacobson MA on 8/23/21 at 4:40 PM EDT

## 2021-08-23 NOTE — TELEPHONE ENCOUNTER
MA LVM returning patient's call regarding current symptoms including: runny nose, sore throat, and earaches. Patient is currently scheduled in November and is requesting to be seen sooner. Due to no availability in the next few weeks patient should see her PCP regarding her symptoms.      Electronically signed by Gloria Castillo MA on 8/23/21 at 2:43 PM EDT

## 2021-08-27 ENCOUNTER — TELEPHONE (OUTPATIENT)
Dept: CARDIOLOGY CLINIC | Age: 66
End: 2021-08-27

## 2021-08-27 RX ORDER — HYDRALAZINE HYDROCHLORIDE 50 MG/1
50 TABLET, FILM COATED ORAL 3 TIMES DAILY
COMMUNITY
End: 2022-07-06 | Stop reason: SDUPTHER

## 2021-09-08 ENCOUNTER — TELEPHONE (OUTPATIENT)
Dept: VASCULAR SURGERY | Age: 66
End: 2021-09-08

## 2021-09-15 ENCOUNTER — OFFICE VISIT (OUTPATIENT)
Dept: CARDIOLOGY CLINIC | Age: 66
End: 2021-09-15
Payer: MEDICARE

## 2021-09-15 ENCOUNTER — TELEPHONE (OUTPATIENT)
Dept: VASCULAR SURGERY | Age: 66
End: 2021-09-15

## 2021-09-15 VITALS
WEIGHT: 228 LBS | RESPIRATION RATE: 14 BRPM | BODY MASS INDEX: 41.96 KG/M2 | HEIGHT: 62 IN | DIASTOLIC BLOOD PRESSURE: 72 MMHG | HEART RATE: 92 BPM | SYSTOLIC BLOOD PRESSURE: 108 MMHG

## 2021-09-15 DIAGNOSIS — I50.42 CHRONIC COMBINED SYSTOLIC AND DIASTOLIC HEART FAILURE (HCC): Primary | ICD-10-CM

## 2021-09-15 PROCEDURE — G8417 CALC BMI ABV UP PARAM F/U: HCPCS | Performed by: INTERNAL MEDICINE

## 2021-09-15 PROCEDURE — 4004F PT TOBACCO SCREEN RCVD TLK: CPT | Performed by: INTERNAL MEDICINE

## 2021-09-15 PROCEDURE — 3017F COLORECTAL CA SCREEN DOC REV: CPT | Performed by: INTERNAL MEDICINE

## 2021-09-15 PROCEDURE — 4040F PNEUMOC VAC/ADMIN/RCVD: CPT | Performed by: INTERNAL MEDICINE

## 2021-09-15 PROCEDURE — 93000 ELECTROCARDIOGRAM COMPLETE: CPT | Performed by: INTERNAL MEDICINE

## 2021-09-15 PROCEDURE — 1090F PRES/ABSN URINE INCON ASSESS: CPT | Performed by: INTERNAL MEDICINE

## 2021-09-15 PROCEDURE — G8399 PT W/DXA RESULTS DOCUMENT: HCPCS | Performed by: INTERNAL MEDICINE

## 2021-09-15 PROCEDURE — 1123F ACP DISCUSS/DSCN MKR DOCD: CPT | Performed by: INTERNAL MEDICINE

## 2021-09-15 PROCEDURE — G8427 DOCREV CUR MEDS BY ELIG CLIN: HCPCS | Performed by: INTERNAL MEDICINE

## 2021-09-15 PROCEDURE — 99214 OFFICE O/P EST MOD 30 MIN: CPT | Performed by: INTERNAL MEDICINE

## 2021-09-15 RX ORDER — ISOSORBIDE MONONITRATE 60 MG/1
TABLET, EXTENDED RELEASE ORAL
Qty: 90 TABLET | Refills: 3 | Status: SHIPPED
Start: 2021-09-15 | End: 2022-04-11

## 2021-09-15 RX ORDER — SIMVASTATIN 20 MG
TABLET ORAL
Qty: 90 TABLET | Refills: 3 | Status: SHIPPED
Start: 2021-09-15 | End: 2022-04-11

## 2021-09-15 RX ORDER — BUMETANIDE 2 MG/1
1 TABLET ORAL DAILY
Qty: 90 TABLET | Refills: 3 | Status: SHIPPED
Start: 2021-09-15 | End: 2021-10-25

## 2021-09-15 RX ORDER — CARVEDILOL 25 MG/1
25 TABLET ORAL 2 TIMES DAILY WITH MEALS
Qty: 180 TABLET | Refills: 3 | Status: SHIPPED
Start: 2021-09-15 | End: 2022-07-06 | Stop reason: SDUPTHER

## 2021-09-15 RX ORDER — METOPROLOL SUCCINATE 25 MG/1
TABLET, EXTENDED RELEASE ORAL
COMMUNITY
Start: 2021-08-25 | End: 2021-09-15 | Stop reason: SDUPTHER

## 2021-09-15 RX ORDER — METOPROLOL SUCCINATE 25 MG/1
TABLET, EXTENDED RELEASE ORAL
Qty: 90 TABLET | Refills: 3 | Status: SHIPPED
Start: 2021-09-15 | End: 2021-10-20 | Stop reason: ALTCHOICE

## 2021-09-15 RX ORDER — SPIRONOLACTONE 25 MG/1
25 TABLET ORAL DAILY
Qty: 90 TABLET | Refills: 3 | Status: ON HOLD
Start: 2021-09-15 | End: 2022-04-19 | Stop reason: HOSPADM

## 2021-09-15 RX ORDER — SACUBITRIL AND VALSARTAN 49; 51 MG/1; MG/1
1 TABLET, FILM COATED ORAL 2 TIMES DAILY
Qty: 180 TABLET | Refills: 3 | Status: SHIPPED
Start: 2021-09-15 | End: 2022-01-31

## 2021-09-15 NOTE — PROGRESS NOTES
OUTPATIENT CARDIOLOGY FOLLOW-UP    Name: Fransisco Lesch    Age: 76 y.o. Primary Care Physician: Cassie Amador DO    Date of Service: 9/15/2021    Chief Complaint:   Chief Complaint   Patient presents with    1 Month Follow-Up    Coronary Artery Disease    Shortness of Breath    Chest Pain       Interim History:  Mrs. Grace Chang is a 25-year-old  female with history of for coronary artery disease underwent CABG in 2011 and history of severe LV dysfunction with an EF of 25% based on echocardiogram done in 2013, cardiac cath in 2013 showed no progression of disease, chronic right bundle-branch block, type II diabetes, hypertension, hyperlipidemia, morbid obesity, active tobacco use still smoking about 10 cigarettes a day, marijuana use, obstructive sleep apnea on CPAP and history of severe peripheral vascular disease came for follow-up visit. She was last seen in the office was on 7/30/2021, Since her last evaluation, she has not had any further hospitalizations or ER visits. She was recently hospitalized from 7/15/2021 to 7/21/2021 with acute hypoxic respiratory failure secondary to acute COPD exacerbation and acute on chronic HFpEF. Since she was discharged from the hospital she is feeling much better however, she still smoking marijuana. She denies any significant leg edema. She is still confused about her medications. She was discharged home on Entresto 49/51 p.o. twice daily. She ran out of the prescription now she is taking only 24/26 mg p.o. twice daily. she was hospitalized from 4/19/2021 to 4/25/2021 with a COVID-19 pneumonia and was treated with steroids and antibiotics. .  She underwent left popliteal and tibial trunk atherectomy on 11/13/2018 and was initiated on plavix and aspirin. She was discharged home on oxygen and she has been using mainly at nighttime. She denies any chest pain, increased dyspnea or leg edema.  She is still smoking about 5 cigarettes a day and does US    Glaucoma, open angle 2/1/2016    Mild-OU    Hepatic encephalopathy ([facility] [address] 02/07/2016    resolved    Hiatal hernia     History of blood transfusion     Hyperlipidemia     Hyperplastic colon polyp     Hypertension     Incontinence     Liver mass     Morbid obesity ([facility] [address]     Movement disorder     [facility]ocardial infarction [facility]) 2011    Osteoarthritis, generalized     Pain in both lower legs 10/11/2017    Peripheral vascular disease ([facility] [address]     Pneumonia 1/26/2014    Pulmonary edema     resolved    PVD (peripheral vascular disease) with claudication ([facility] [address] 8/30/2017    Sleep apnea     uses BI PAP    Status post peripheral artery angioplasty 10/31/2019    Tobacco abuse     Tobacco abuse 10/11/2017    Tubular adenoma polyp of rectum     Urinary tract infection due to ESBL Klebsiella 03/08/2017    Ventral hernia        Past Surgical History:  Past Surgical History:   Procedure Laterality Date    ANGIOPLASTY  11/13/2018    Dr. Radha Moody & athrectomy L SFA & Popliteal    BREAST SURGERY  2000    bilateral reduction    BRONCHIAL BRUSH BIOPSY  1/25/2013    Dr Ina Conteh  04/20/2015    Dr. Cecy Kahn  12/2011     DR. Cata Gatica,  follows Dr Ewa Crabtree  11/15/2013    Dr Shirley Cohen COLONOSCOPY  12/23/2016    Dr Kennedi Ceballos adenoma & hyperplastic polyps, melanosis coli (repeat one year 12/2017)    CORONARY ARTERY BYPASS GRAFT      ECHO COMPLETE  10/9/2013         ENDOSCOPY, COLON, DIAGNOSTIC  04/18/2017    GALLBLADDER SURGERY      gallstones removed, [facility] 8/2017    HYSTERECTOMY      JOINT REPLACEMENT  2011    LEFT KNEE    KNEE SURGERY      left knee replacement    LAYER WOUND CLOSURE Left [id number]    LIVER BIOPSY  1/8/2016    [facility]    POLYSOMNOGRAPHY  1/2016    [facility]    UPPER GASTROINTESTINAL ENDOSCOPY  12/20/12    UPPER GASTROINTESTINAL ENDOSCOPY  12/23/2016    Dr Zayda Ma GASTROINTESTINAL ENDOSCOPY  02/08/2017       Family History:  Family History   Problem Relation Age of Onset    Cancer Mother     Asthma Father        Social History:  Social History     Socioeconomic History    Marital status:      Spouse name: Not on file    Number of children: 2    Years of education: Not on file    Highest education level: Not on file   Occupational History    Occupation: disability   Tobacco Use    Smoking status: Current Every Day Smoker     Packs/day: 0.25     Years: 40.00     Pack years: 10.00     Types: Cigarettes     Start date: 8/10/1974    Smokeless tobacco: Never Used    Tobacco comment: 1 pack every 3 days (6-7 cig)   Vaping Use    Vaping Use: Never used   Substance and Sexual Activity    Alcohol use: Yes     Alcohol/week: 0.0 standard drinks     Comment: social    Drug use: Yes     Types: Marijuana     Comment: \"every 4th or 5th day out of the week\"    Sexual activity: Never   Other Topics Concern    Not on file   Social History Narrative    Pt lives with brother in her house-pt has never driven a car and depends on transportation     Social Determinants of Health     Financial Resource Strain:     Difficulty of Paying Living Expenses:    Food Insecurity:     Worried About Running Out of Food in the Last Year:     [address] in the Last Year:    Transportation Needs:     Lack of Transportation (Medical):      Lack of Transportation (Non-Medical):    Physical Activity:     Days of Exercise per Week:     Minutes of Exercise per Session:    Stress:     Feeling of Stress :    Social Connections:     Frequency of Communication with Friends and Family:     Frequency of Social Gatherings with Friends and Family:     Attends Bahai Services:     Active Member of Clubs or Organizations:     Attends Club or Organization Meetings:     Marital Status:    Intimate Partner Violence:     Fear of Current or Ex-Partner:     Emotionally Abused:     Physically Abused:     Sexually Abused: Allergies: Allergies   Allergen Reactions    Latex Hives    Bee Venom Anaphylaxis    Dilaudid [Hydromorphone Hcl] Itching    Dye [Iodides] Hives and Shortness Of Breath    Percocet [Oxycodone-Acetaminophen] Shortness Of Breath and Itching    Keflex [Cephalexin] Itching and Rash    Lasix [Furosemide] Other (See Comments)     Pt states she cramps up and gets headaches    Levaquin [Levofloxacin In D5w] Hives    Lipitor      MUSCLE SPASMS    Lyrica [Pregabalin]      Dream disturbances    Morphine Hives    Naproxen      Unsure of reaction;pt states able to take Aleve without difficulties    Nefazodone Other (See Comments)    Norvasc [Amlodipine] Swelling    Oxycodone-Acetaminophen Swelling    Shellfish-Derived Products     Trazodone And Nefazodone        Current Medications:  Current Outpatient Medications   Medication Sig Dispense Refill    metoprolol succinate (TOPROL XL) 25 MG extended release tablet TAKE 1 TABLET BY MOUTH TWICE DAILY      hydrALAZINE (APRESOLINE) 50 MG tablet Take 50 mg by mouth 3 times daily      sacubitril-valsartan (ENTRESTO) 49-51 MG per tablet Take 1 tablet by mouth 2 times daily 60 tablet 3    bumetanide (BUMEX) 2 MG tablet Take 1 tablet by mouth daily (Patient taking differently: Take 1 mg by mouth daily ) 30 tablet 3    nystatin (MYCOSTATIN) 094082 UNIT/ML suspension Take 5 mLs by mouth 4 times daily 1 Bottle 0    Arformoterol Tartrate (BROVANA) 15 MCG/2ML NEBU Take 2 mLs by nebulization 2 times daily 120 mL 3    ipratropium-albuterol (DUONEB) 0.5-2.5 (3) MG/3ML SOLN nebulizer solution Inhale 3 mLs into the lungs every 4 hours (while awake) 360 mL 0    pantoprazole (PROTONIX) 40 MG tablet TAKE 1 TABLET BY MOUTH EVERY DAY      aspirin (ASPIRIN LOW DOSE) 81 MG chewable tablet TAKE 1 TABLET BY MOUTH EVERY DAY 90 tablet 1    miconazole (MICOTIN) 2 % powder Apply topically 2 times daily.  45 g 1    carvedilol (COREG) 25 MG MOUTH NIGHTLY 30 tablet 10    simvastatin (ZOCOR) 20 MG tablet TAKE 1 TABLET BY MOUTH NIGHTLY 90 tablet 3    azelastine (ASTELIN) 0.1 % nasal spray 1 spray by Nasal route 2 times daily Use in each nostril as directed 1 Bottle 3    Handicap Placard MISC by Does not apply route Patient cannot walk 200 ft without stopping to rest.    Expiration 8/2022 1 each 0    BiPAP Machine MISC by Does not apply route nightly 27/23      predniSONE (DELTASONE) 10 MG tablet 30 mg oral daily for 3 days   Then 20 mg oral daily for 3 days  Then 10 mg oral daily for 3 days then off. (Patient not taking: Reported on 9/15/2021) 18 tablet 0    lidocaine 4 % external patch Place 1 patch onto the skin daily (Patient not taking: Reported on 9/15/2021) 1 box 0    TRULANCE 3 MG TABS TAKE (1) TABLET BY MOUTH EVERY DAY (Patient not taking: Reported on 9/15/2021)      guaiFENesin 400 MG tablet Take 1 tablet by mouth 2 times daily as needed for Cough (Patient not taking: Reported on 9/15/2021) 60 tablet 2     No current facility-administered medications for this visit. Physical Exam:  /72   Pulse 92   Resp 14   Ht 5' 2\" (1.575 m)   Wt 228 lb (103.4 kg)   LMP 01/01/1990   BMI 41.70 kg/m²   Wt Readings from Last 3 Encounters:   09/15/21 228 lb (103.4 kg)   07/30/21 225 lb 1.6 oz (102.1 kg)   07/21/21 223 lb 6.4 oz (101.3 kg)     Appearance: Awake, alert and oriented x 3, no acute respiratory distress she is morbidly obese. Skin: Intact, no rash  Head: Normocephalic, atraumatic  Eyes: EOMI, no conjunctival erythema  ENMT: No pharyngeal erythema, MMM, no rhinorrhea  Neck: Supple, no elevated JVP, no carotid bruits  Lungs: Clear to auscultation bilaterally. No wheezes, rales, or rhonchi.   Cardiac: Regular rate and rhythm, +S1S2, no murmurs apparent  Abdomen: Soft, nontender, +bowel sounds  Extremities: Moves all extremities x 4, trace lower extremity edema  Neurologic: No focal motor deficits apparent, normal mood and affect, alert and oriented x 3  Peripheral Pulses: Intact posterior tibial pulses bilaterally    Laboratory Tests:  Lab Results   Component Value Date    CREATININE 1.2 (H) 07/20/2021    BUN 32 (H) 07/20/2021     07/20/2021    K 4.1 07/20/2021    CL 89 (L) 07/20/2021    CO2 40 (H) 07/20/2021     Lab Results   Component Value Date    MG 2.0 04/13/2019     Lab Results   Component Value Date    WBC 8.2 07/20/2021    HGB 12.2 07/20/2021    HCT 40.1 07/20/2021    MCV 95.9 07/20/2021     07/20/2021     Lab Results   Component Value Date    ALT 15 07/20/2021    AST 9 07/20/2021    ALKPHOS 97 07/20/2021    BILITOT 0.3 07/20/2021     Lab Results   Component Value Date    CKTOTAL 88 01/05/2016    CKMB 1.2 01/05/2016    TROPONINI <0.01 04/19/2021    TROPONINI <0.01 03/12/2021    TROPONINI <0.01 09/19/2019     Lab Results   Component Value Date    INR 1.1 04/19/2021    INR 1.0 01/04/2019    INR 1.1 11/13/2018    PROTIME 12.6 (H) 04/19/2021    PROTIME 11.7 01/04/2019    PROTIME 12.3 11/13/2018     Lab Results   Component Value Date    TSH 0.646 02/05/2021     Lab Results   Component Value Date    LABA1C 6.9 (H) 07/16/2021     No results found for: EAG  Lab Results   Component Value Date    CHOL 131 02/05/2021    CHOL 162 06/16/2020    CHOL 222 (H) 06/05/2018     Lab Results   Component Value Date    TRIG 219 (H) 02/05/2021    TRIG 89 06/16/2020    TRIG 84 06/05/2018     Lab Results   Component Value Date    HDL 27 02/05/2021    HDL 46 06/16/2020    HDL 38 06/05/2018     Lab Results   Component Value Date    LDLCALC 60 02/05/2021    LDLCALC 98 06/16/2020    LDLCALC 167 (H) 06/05/2018     Lab Results   Component Value Date    LABVLDL 44 02/05/2021    LABVLDL 18 06/16/2020    LABVLDL 17 06/05/2018     No results found for: CHOLHDLRATIO    Cardiac Tests:  ECG: Normal sinus rhythm, RBBB abnormal ECG        Echocardiogram: 1/4/2017  LVEF 35-45%.  LV diffuse hypokinesis,mild MR  TTE- 6/5/2018:LVEF 65%, moderate LVH, stage II diastolic dysfunction,    [facility]-6/9/4196-LWZO 65%, moderate LVH, stage II diastolic dysfunction,    Stress test: 6/05/2018:  1. Pharmacologically-induced perfusion defect involving the lateral   wall, without evidence of reversibility. 2. Diffusely hypokinetic left ventricular wall motion with a focal   area of akinesia involving the mid lateral wall. 3. Left ventricular ejection fraction of 42 %       The 10-year ASCVD risk score (Francesco Coy, et al., 2013) is: 22%    Values used to calculate the score:      Age: 76 years      Sex: Female      Is Non- : Yes      Diabetic: Yes      Tobacco smoker: Yes      Systolic Blood Pressure: 214 mmHg      Is BP treated: Yes      HDL Cholesterol: 27 mg/dL      Total Cholesterol: 131 mg/dL  [facility]-4/[facility]2015:  IMPRESSION:   1. Atherosclerotic coronary disease. a. Status post remote bypass grafting. Patent SVG to OM circumflex. b.    Jenet Gasmen to selectively cannulate left internal mammary artery      graft, but there is no  significant disease in the LAD itself. 2.    Moderate global left ventricular systolic dysfunction. ASSESSMENT:  · Heart failure with an improved ejection fraction with an EF of 40-45%-->65%>>>55-60%, euvolemic and hemodynamically stable   · ACC/AHA, stage C., NYHA functional class II, now euvolemic. Diastolic dysfunction   · CAD, status post CABG in 2011 LIMA to LAD, SVG to OM 1  · Morbid obesity with obstructive sleep apnea on Trilogy , unable to tolerate C-pap  · Hypertension-stable  · Hyperlipidemia on statin  · Type II diabetes  · Tobacco and marijuana use, still smoking about 5 to 6 cigarettes a day. · PAD, S/p left popliteal and tibial atherectomy/angioplasty -stable  · Chronic RBBB.  · H/o COVID-19 pneumonia, recovered  · COPD secondary tobacco use disorder  · Chronic bronchitis secondary to tobacco use. Plan:   1.  She is on guideline directed medical therapy for heart failure with improved ejection fraction, continue Entresto 49/51 mg  p.o. twice daily, spironolactone 25 mg po daily  and Coreg  25 mg p.o. twice daily  2. Continue Bumex to Bumex to 1 mg po daily due to increased urination. 3. She was strongly encouraged to adhere to strict cardiac diet and restrict daily salt intake to less than 2 g  4. Continue rest of the medications including hydralazine 25 mg 3 times a day and continue Imdur 60 mg p.o. daily. All her cardiac medications are reviewed and refills were given for a month. 5. She was advised again to stop smoking and told me that she does not want to quit. 6. Follow up in one 3 month. The patient's current medication list, allergies, problem list and results of all previously ordered testing were reviewed at today's visit.   Darline Landers MD  [facility]) Cardiology

## 2021-09-15 NOTE — PATIENT INSTRUCTIONS
1. She is on guideline directed medical therapy for heart failure with reduced ejection fraction, continue Entresto 49/51 mg  p.o. twice daily, spironolactone 25 mg po daily  and Coreg  25 mg p.o. twice daily  2. Continue Bumex to Bumex to 1 mg po daily due to increased urination. 3. She was strongly encouraged to adhere to strict cardiac diet and restrict daily salt intake to less than 2 g  4. Continue rest of the medications including hydralazine 25 mg 3 times a day and continue Imdur 60 mg p.o. daily. 5. She was advised again to stop smoking and told me that she does not want to quit. 6. Follow up in one 3 month.

## 2021-09-16 ENCOUNTER — TELEPHONE (OUTPATIENT)
Dept: VASCULAR SURGERY | Age: 66
End: 2021-09-16

## 2021-09-16 ENCOUNTER — OFFICE VISIT (OUTPATIENT)
Dept: VASCULAR SURGERY | Age: 66
End: 2021-09-16
Payer: MEDICARE

## 2021-09-16 VITALS — HEIGHT: 61 IN | BODY MASS INDEX: 41.91 KG/M2 | WEIGHT: 222 LBS

## 2021-09-16 DIAGNOSIS — F17.210 CIGARETTE SMOKER: Chronic | ICD-10-CM

## 2021-09-16 DIAGNOSIS — Z99.81 O2 DEPENDENT: ICD-10-CM

## 2021-09-16 DIAGNOSIS — I73.9 PVD (PERIPHERAL VASCULAR DISEASE) WITH CLAUDICATION (HCC): Primary | ICD-10-CM

## 2021-09-16 PROCEDURE — 1090F PRES/ABSN URINE INCON ASSESS: CPT | Performed by: SURGERY

## 2021-09-16 PROCEDURE — 1123F ACP DISCUSS/DSCN MKR DOCD: CPT | Performed by: SURGERY

## 2021-09-16 PROCEDURE — 3017F COLORECTAL CA SCREEN DOC REV: CPT | Performed by: SURGERY

## 2021-09-16 PROCEDURE — G8427 DOCREV CUR MEDS BY ELIG CLIN: HCPCS | Performed by: SURGERY

## 2021-09-16 PROCEDURE — 4040F PNEUMOC VAC/ADMIN/RCVD: CPT | Performed by: SURGERY

## 2021-09-16 PROCEDURE — G8399 PT W/DXA RESULTS DOCUMENT: HCPCS | Performed by: SURGERY

## 2021-09-16 PROCEDURE — 99214 OFFICE O/P EST MOD 30 MIN: CPT | Performed by: SURGERY

## 2021-09-16 PROCEDURE — G8417 CALC BMI ABV UP PARAM F/U: HCPCS | Performed by: SURGERY

## 2021-09-16 PROCEDURE — 4004F PT TOBACCO SCREEN RCVD TLK: CPT | Performed by: SURGERY

## 2021-09-16 NOTE — PROGRESS NOTES
Chief Complaint:   Chief Complaint   Patient presents with    Circulatory Problem     pulses weaker         HPI: Patient, known to have significant peripheral vascular disease, in fact underwent left femoral angiogram angioplasty and atherectomy Dr. Rachel Rai 2018, at the time of last visit, last year, was found to have bilateral femoral-popliteal arterial occlusive disease right more than the left    Patient recently went to see her podiatrist Dr. Jeanie Abdalla, was found to have diminished pulses, underwent what appears to be screening study that was abnormal and patient was referred back to the vascular service for further evaluation    Patient came here for complete vascular evaluation, concerned    Patient does have significant pulmonary history, uses oxygen at nighttime on a BiPAP machine      Patient denies any focal lateralizing neurological symptoms like loss of speech, vision or loss of function of extremity    Patient can walk short distance only slowly with the help of a walker, and denies any symptoms of rest pain    Allergies   Allergen Reactions    Latex Hives    Bee Venom Anaphylaxis    Dilaudid [Hydromorphone Hcl] Itching    Dye [Iodides] Hives and Shortness Of Breath    Percocet [Oxycodone-Acetaminophen] Shortness Of Breath and Itching    Keflex [Cephalexin] Itching and Rash    Lasix [Furosemide] Other (See Comments)     Pt states she cramps up and gets headaches    Levaquin [Levofloxacin In D5w] Hives    Lipitor      MUSCLE SPASMS    Lyrica [Pregabalin]      Dream disturbances    Morphine Hives    Naproxen      Unsure of reaction;pt states able to take Aleve without difficulties    Nefazodone Other (See Comments)    Norvasc [Amlodipine] Swelling    Oxycodone-Acetaminophen Swelling    Shellfish-Derived Products     Trazodone And Nefazodone        Current Outpatient Medications   Medication Sig Dispense Refill    sacubitril-valsartan (ENTRESTO) 49-51 MG per tablet Take 1 tablet by mouth 2 times daily 180 tablet 3    spironolactone (ALDACTONE) 25 MG tablet Take 1 tablet by mouth daily 90 tablet 3    metoprolol succinate (TOPROL XL) 25 MG extended release tablet TAKE 1 TABLET BY MOUTH TWICE DAILY 90 tablet 3    simvastatin (ZOCOR) 20 MG tablet TAKE 1 TABLET BY MOUTH NIGHTLY 90 tablet 3    isosorbide mononitrate (IMDUR) 60 MG extended release tablet TAKE (1) TABLET BY MOUTH DAILY 90 tablet 3    carvedilol (COREG) 25 MG tablet Take 1 tablet by mouth 2 times daily (with meals) 180 tablet 3    bumetanide (BUMEX) 2 MG tablet Take 0.5 tablets by mouth daily 90 tablet 3    hydrALAZINE (APRESOLINE) 50 MG tablet Take 50 mg by mouth 3 times daily      nystatin (MYCOSTATIN) 682828 UNIT/ML suspension Take 5 mLs by mouth 4 times daily 1 Bottle 0    Arformoterol Tartrate (BROVANA) 15 MCG/2ML NEBU Take 2 mLs by nebulization 2 times daily 120 mL 3    ipratropium-albuterol (DUONEB) 0.5-2.5 (3) MG/3ML SOLN nebulizer solution Inhale 3 mLs into the lungs every 4 hours (while awake) 360 mL 0    pantoprazole (PROTONIX) 40 MG tablet TAKE 1 TABLET BY MOUTH EVERY DAY      aspirin (ASPIRIN LOW DOSE) 81 MG chewable tablet TAKE 1 TABLET BY MOUTH EVERY DAY 90 tablet 1    miconazole (MICOTIN) 2 % powder Apply topically 2 times daily. 45 g 1    DULoxetine (CYMBALTA) 60 MG extended release capsule TAKE ONE (1) CAPSULE BY MOUTH TWICE DAILY 60 capsule 1    montelukast (SINGULAIR) 10 MG tablet TAKE (1) TABLET BY MOUTH NIGHTLY 30 tablet 1    fluticasone (FLONASE) 50 MCG/ACT nasal spray USE 1 SPRAY IN EACH NOSTRIL TWICE A DAY 1 Bottle 1    MYRBETRIQ 50 MG TB24 TAKE 1 TABLET BY MOUTH EVERY DAY 90 tablet 1    metoclopramide (REGLAN) 5 MG tablet Take 1 tablet by mouth 3 times daily 120 tablet 3    gabapentin (NEURONTIN) 100 MG capsule Take 1 capsule by mouth 2 times daily for 180 days.  Intended supply: 30 days 60 capsule 5    Acetaminophen (TYLENOL) 325 MG CAPS Take 325 mg by mouth every 6 hours as needed (pain) 30 capsule 0    insulin lispro, 1 Unit Dial, (HUMALOG KWIKPEN) 100 UNIT/ML SOPN Use sliding scale as below TID with meals Sugar  0 units Sugar 131-180 2 units Sugar 181-240 4 units Sugar 241-300 6 units Sugar 301-350 8 units Sugar 351-400 10 units Sugar > 400 12 units Call above 500 5 pen 2    vitamin D3 (CHOLECALCIFEROL) 25 MCG (1000 UT) TABS tablet TAKE 1 TABLET BY MOUTH EVERY DAY 90 tablet 1    Insulin Degludec (TRESIBA FLEXTOUCH) 200 UNIT/ML SOPN INJECT 50 UNITS INTO THE SKIN nightly 13 pen 2    cetirizine (ZYRTEC) 10 MG tablet TAKE 1 TABLET BY MOUTH EVERY DAY 90 tablet 1    baclofen (LIORESAL) 10 MG tablet TAKE 1 TABLET BY MOUTH TWICE A DAY AS NEEDED (Patient taking differently: Take 10 mg by mouth 2 times daily as needed ) 180 tablet 1    docusate sodium (COLACE) 100 MG capsule TAKE 2 CAPSULES BY MOUTH AT BEDTIME      SPIRIVA RESPIMAT 1.25 MCG/ACT AERS inhaler INHALE TWO (2) PUFFS BY MOUTH EVERY DAY 1 Inhaler 5    traZODone (DESYREL) 100 MG tablet TAKE (1) TABLET BY MOUTH NIGHTLY 30 tablet 10    azelastine (ASTELIN) 0.1 % nasal spray 1 spray by Nasal route 2 times daily Use in each nostril as directed 1 Bottle 3    Handicap Placard MISC by Does not apply route Patient cannot walk 200 ft without stopping to rest.    Expiration 8/2022 1 each 0    BiPAP Machine MISC by Does not apply route nightly 27/23       No current facility-administered medications for this visit.        Past Medical History:   Diagnosis Date    Asthma     Atherosclerosis of native artery of left leg with rest pain ([address] 11/9/2018    C. difficile diarrhea 2015    CAD (coronary artery disease) 2011    Cellulitis and abscess of trunk 4/14/2015    Cerebellar infarct ([address] 4/3/2019    Remote, rt. cerebellum, head CT scan, 1/9/19    Chronic back pain     Chronic kidney disease     Chronic systolic congestive heart failure ([address] 10/4/2013    Chronic venous insufficiency 10/11/2017    COPD (chronic obstructive pulmonary disease) ([address]     Depression     Diabetes mellitus ([address]     Diabetic neuropathy ([address]     Diverticulosis     Fatty liver 1/8/2016    per US    Glaucoma, open angle 2/1/2016    Mild-OU    Hepatic encephalopathy ([address] 02/07/2016    resolved    Hiatal hernia     History of blood transfusion     Hyperlipidemia     Hyperplastic colon polyp     Hypertension     Incontinence     Liver mass     Morbid obesity ([address]     Movement disorder     Myocardial infarction ([address] 2011    O2 dependent 9/16/2021    Osteoarthritis, generalized     Pain in both lower legs 10/11/2017    Peripheral vascular disease ([address]     Pneumonia 1/26/2014    Pulmonary edema     resolved    PVD (peripheral vascular disease) with claudication ([address] 8/30/2017    Sleep apnea     uses BI PAP    Status post peripheral artery angioplasty 10/31/2019    Tobacco abuse     Tobacco abuse 10/11/2017    Tubular adenoma polyp of rectum     Urinary tract infection due to ESBL Klebsiella 03/08/2017    Ventral hernia        Past Surgical History:   Procedure Laterality Date    ANGIOPLASTY  11/13/2018    Dr. Zabrina Bryan & athrectomy L SFA & Popliteal    BREAST SURGERY  2000    bilateral reduction    BRONCHIAL BRUSH BIOPSY  1/25/2013    Dr Eduin Serrano  04/20/2015    Dr. Emperatriz Elias  12/2011     DR. Larry Vegas,  follows Dr Steven Linares  11/15/2013    Dr Geni Gonzalez COLONOSCOPY  12/23/2016    Dr Iona Gautam adenoma & hyperplastic polyps, melanosis coli (repeat one year 12/2017)    CORONARY ARTERY BYPASS GRAFT      ECHO COMPLETE  10/9/2013         ENDOSCOPY, COLON, DIAGNOSTIC  04/18/2017    GALLBLADDER SURGERY      gallstones removed, [facility] 8/2017    HYSTERECTOMY      JOINT REPLACEMENT  2011    LEFT KNEE    KNEE SURGERY      left knee replacement    LAYER WOUND CLOSURE Left [id number]    LIVER BIOPSY  1/8/2016    [facility]    POLYSOMNOGRAPHY  1/2016 [facility]    UPPER GASTROINTESTINAL ENDOSCOPY  12/20/12    UPPER GASTROINTESTINAL ENDOSCOPY  12/23/2016    Dr Anderson Boys UPPER GASTROINTESTINAL ENDOSCOPY  02/08/2017       Family History   Problem Relation Age of Onset    Cancer Mother     Asthma Father        Social History     Socioeconomic History    Marital status:      Spouse name: Not on file    Number of children: 2    Years of education: Not on file    Highest education level: Not on file   Occupational History    Occupation: disability   Tobacco Use    Smoking status: Current Every Day Smoker     Packs/day: 0.25     Years: 40.00     Pack years: 10.00     Types: Cigarettes     Start date: 8/10/1974    Smokeless tobacco: Never Used    Tobacco comment: 1 pack every 3 days (6-7 cig)   Vaping Use    Vaping Use: Never used   Substance and Sexual Activity    Alcohol use: Yes     Alcohol/week: 0.0 standard drinks     Comment: social    Drug use: Yes     Types: Marijuana     Comment: \"every 4th or 5th day out of the week\"    Sexual activity: Never   Other Topics Concern    Not on file   Social History Narrative    Pt lives with brother in her house-pt has never driven a car and depends on transportation     Social Determinants of Health     Financial Resource Strain:     Difficulty of Paying Living Expenses:    Food Insecurity:     Worried About Running Out of Food in the Last Year:     [address] in the Last Year:    Transportation Needs:     Lack of Transportation (Medical):      Lack of Transportation (Non-Medical):    Physical Activity:     Days of Exercise per Week:     Minutes of Exercise per Session:    Stress:     Feeling of Stress :    Social Connections:     Frequency of Communication with Friends and Family:     Frequency of Social Gatherings with Friends and Family:     Attends Sikhism Services:     Active Member of Clubs or Organizations:     Attends Club or Organization Meetings:     Marital Status: Intimate Partner Violence:     Fear of Current or Ex-Partner:     Emotionally Abused:     Physically Abused:     Sexually Abused:        Review of Systems:    Eyes:  No blurring, diplopia or vision loss. Respiratory:  No cough, pleuritic chest pain, dyspnea, or wheezing. Chronic obstructive lung disease, oxygen dependent at nighttime on BiPAP, tobacco use  Cardiovascular: No angina, palpitations . Coronary artery disease, hypertension, hyperlipidemia  Musculoskeletal:  No arthritis or weakness. Neurologic:  No paralysis, paresis, paresthesia, seizures or headache. Endocrinology: Diabetes mellitus  Gastrointestinal GERD        Physical Exam:  General appearance:  Alert, awake, oriented x 3. No distress. Obese  Eyes:  Conjunctivae appear normal; PERRL  Neck:  No jugular venous distention, lymphadenopathy or thyromegaly. No evidence of carotid bruit  Lungs:  Clear to ausculation bilaterally. No rhonchi, crackles, wheezes  Heart:  Regular rate and rhythm. No rub or murmur  Abdomen:  Soft, non-tender. No masses, organomegaly. Musculoskeletal : No joint effusions, tenderness swelling    Neuro: Speech is intact. Moving all extremities. No focal motor or sensory deficits      Extremities:  Both feet are warm to touch. The color of both feet is normal.        Pulses Right  Left    Brachial 3 3    Radial    3=normal   Femoral 2 2  2=diminished   Popliteal    1=barely palpable   Dorsalis pedis 0 1  0=absent   Posterior tibial    4=aneurysmal             Other pertinent information:1. The past medical records were reviewed. 2. Last lower extremity artery Doppler study was personally reviewed again    3. Her podiatrist office was contacted regarding recent screening testing done on her feet    Assessment:    1. PVD (peripheral vascular disease) with claudication ([address]    2. Cigarette smoker    3.  O2 dependent              Plan:       Discussed in detail the patient's, options, risks benefits and alternatives explained, patient was counseled to stop smoking completely    Patient recommend complete lower extremity artery Doppler study as her recent screening study showed compromise of blood flow to the feet              Patient was instructed to continue walking program and to call if any worsening of symptoms and to call if any focal lateralizing neurological symptoms like loss of speech, vision or loss of function of extremity. All the questions were answered. Orders Placed This Encounter   Procedures    VL LOWER EXTREMITY ARTERIAL SEGMENTAL PRESSURES W PPG     CC to Dr. Ute Hough        Indicated follow-up: Return in about 1 year (around 9/16/2022), or if symptoms worsen or fail to improve.

## 2021-10-19 ENCOUNTER — TELEPHONE (OUTPATIENT)
Dept: CARDIOLOGY CLINIC | Age: 66
End: 2021-10-19

## 2021-10-20 ENCOUNTER — TELEPHONE (OUTPATIENT)
Dept: CARDIOLOGY CLINIC | Age: 66
End: 2021-10-20

## 2021-10-20 NOTE — TELEPHONE ENCOUNTER
Left message for pharmacist at [facility] notifying of Dr. Boateng's recommendations. Patient notified of Dr. Boateng's recommendations. Med list amended.

## 2021-10-20 NOTE — TELEPHONE ENCOUNTER
Patient called stating she has been having shortness of breath. Oxygen 94-95%. Pulmonology put her on Prednisone and advised that she should not take Hydralazine and Coreg. He advised her to stop Hydralazine. She is asking for Dr. Neil Cranker opinion. Please advise.

## 2021-10-22 NOTE — TELEPHONE ENCOUNTER
I spoke to Dr. Stephanie Cox and he told me that he did not recommend her to stop hydralazine or Coreg. She should continue those 2 medications.

## 2021-10-25 RX ORDER — BUMETANIDE 1 MG/1
1 TABLET ORAL DAILY
Qty: 30 TABLET | Refills: 11 | Status: SHIPPED
Start: 2021-10-25 | End: 2022-07-06 | Stop reason: SDUPTHER

## 2021-10-28 ENCOUNTER — HOSPITAL ENCOUNTER (OUTPATIENT)
Dept: INTERVENTIONAL RADIOLOGY/VASCULAR | Age: 66
Discharge: HOME | End: 2021-10-30
Payer: MEDICARE

## 2021-10-28 DIAGNOSIS — I73.9 PVD (PERIPHERAL VASCULAR DISEASE) WITH CLAUDICATION (HCC): ICD-10-CM

## 2021-10-28 PROCEDURE — 93923 UPR/LXTR ART STDY 3+ LVLS: CPT

## 2021-10-31 ENCOUNTER — CLINICAL DOCUMENTATION (OUTPATIENT)
Dept: VASCULAR SURGERY | Age: 66
End: 2021-10-31

## 2021-10-31 NOTE — PROGRESS NOTES
The lower extremity artery Doppler study was personally reviewed by me, has biphasic right ankle Doppler tracing and almost biphasic left ankle Doppler tracing, with ankle-brachial index of 0.68 on the right 0.83 on the left side with adequate collateral flow to both feet based upon the pulse volume recordings over the metatarsals, overall stable arterial flow compared with previous studies, with minimal worsening on the left side

## 2021-11-03 ENCOUNTER — TELEPHONE (OUTPATIENT)
Dept: VASCULAR SURGERY | Age: 66
End: 2021-11-03

## 2021-11-03 NOTE — TELEPHONE ENCOUNTER
Notified pt of le arterial Doppler results:  Minimal worsening on the left, continue to walk as much as possible, call prn, keep 10/2022 appt.

## 2022-01-31 RX ORDER — SACUBITRIL AND VALSARTAN 49; 51 MG/1; MG/1
TABLET, FILM COATED ORAL
Qty: 60 TABLET | Refills: 11 | Status: SHIPPED
Start: 2022-01-31 | End: 2022-04-11

## 2022-02-18 ENCOUNTER — HOSPITAL ENCOUNTER (OUTPATIENT)
Age: 67
Discharge: HOME | End: 2022-02-20

## 2022-02-18 PROCEDURE — 88305 TISSUE EXAM BY PATHOLOGIST: CPT

## 2022-03-04 ENCOUNTER — TELEPHONE (OUTPATIENT)
Dept: ADMINISTRATIVE | Age: 67
End: 2022-03-04

## 2022-03-04 NOTE — TELEPHONE ENCOUNTER
Patient states she has called and left  on nurse line last week and this week but hasn't heard back. States she seen her PCP for ear and throat pain, along with congestion but was advised to follow up with ENT. States she can barley hear from left ear and is having a hard time eating from throat pain. Please advise for scheduling.

## 2022-03-04 NOTE — TELEPHONE ENCOUNTER
MA spoke with patient, scheduled 3/9 with Dr. Jose Vega.     Electronically signed by Stephanie Joyner MA on 3/4/22 at 2:22 PM EST

## 2022-03-09 ENCOUNTER — OFFICE VISIT (OUTPATIENT)
Dept: ENT CLINIC | Age: 67
End: 2022-03-09
Payer: MEDICARE

## 2022-03-09 ENCOUNTER — TELEPHONE (OUTPATIENT)
Dept: CARDIOLOGY CLINIC | Age: 67
End: 2022-03-09

## 2022-03-09 VITALS
BODY MASS INDEX: 40.48 KG/M2 | HEART RATE: 87 BPM | SYSTOLIC BLOOD PRESSURE: 145 MMHG | DIASTOLIC BLOOD PRESSURE: 78 MMHG | OXYGEN SATURATION: 93 % | HEIGHT: 62 IN | WEIGHT: 220 LBS | TEMPERATURE: 97.7 F

## 2022-03-09 DIAGNOSIS — R49.0 HOARSENESS: Primary | ICD-10-CM

## 2022-03-09 DIAGNOSIS — E66.01 OBESITY, CLASS III, BMI 40-49.9 (MORBID OBESITY) (HCC): ICD-10-CM

## 2022-03-09 DIAGNOSIS — G47.33 OSA (OBSTRUCTIVE SLEEP APNEA): ICD-10-CM

## 2022-03-09 DIAGNOSIS — J32.4 CHRONIC PANSINUSITIS: ICD-10-CM

## 2022-03-09 PROCEDURE — 1123F ACP DISCUSS/DSCN MKR DOCD: CPT | Performed by: OTOLARYNGOLOGY

## 2022-03-09 PROCEDURE — G8417 CALC BMI ABV UP PARAM F/U: HCPCS | Performed by: OTOLARYNGOLOGY

## 2022-03-09 PROCEDURE — 99213 OFFICE O/P EST LOW 20 MIN: CPT | Performed by: OTOLARYNGOLOGY

## 2022-03-09 PROCEDURE — 3017F COLORECTAL CA SCREEN DOC REV: CPT | Performed by: OTOLARYNGOLOGY

## 2022-03-09 PROCEDURE — 4040F PNEUMOC VAC/ADMIN/RCVD: CPT | Performed by: OTOLARYNGOLOGY

## 2022-03-09 PROCEDURE — 31575 DIAGNOSTIC LARYNGOSCOPY: CPT | Performed by: OTOLARYNGOLOGY

## 2022-03-09 PROCEDURE — 4004F PT TOBACCO SCREEN RCVD TLK: CPT | Performed by: OTOLARYNGOLOGY

## 2022-03-09 PROCEDURE — 1090F PRES/ABSN URINE INCON ASSESS: CPT | Performed by: OTOLARYNGOLOGY

## 2022-03-09 PROCEDURE — G8399 PT W/DXA RESULTS DOCUMENT: HCPCS | Performed by: OTOLARYNGOLOGY

## 2022-03-09 PROCEDURE — G8427 DOCREV CUR MEDS BY ELIG CLIN: HCPCS | Performed by: OTOLARYNGOLOGY

## 2022-03-09 PROCEDURE — G8484 FLU IMMUNIZE NO ADMIN: HCPCS | Performed by: OTOLARYNGOLOGY

## 2022-03-09 RX ORDER — FLUTICASONE PROPIONATE 50 MCG
2 SPRAY, SUSPENSION (ML) NASAL DAILY
Qty: 16 G | Refills: 5 | Status: SHIPPED
Start: 2022-03-09 | End: 2022-04-01

## 2022-03-09 ASSESSMENT — ENCOUNTER SYMPTOMS
RHINORRHEA: 1
STRIDOR: 0
SINUS PAIN: 0
SINUS PRESSURE: 1
CHOKING: 0
SORE THROAT: 0
WHEEZING: 0
GASTROINTESTINAL NEGATIVE: 1
COLOR CHANGE: 0
COUGH: 0
VOICE CHANGE: 1
TROUBLE SWALLOWING: 1

## 2022-03-09 ASSESSMENT — VISUAL ACUITY: OU: 1

## 2022-03-09 NOTE — TELEPHONE ENCOUNTER
Patient called stating she needs cardiac clearance for a throat biopsy by Dr. Mary Vargas on 4/4/22. Patient denies any chest pain since last visit in September 2021. However, she has had occasional shortness of breath and palpitations. Patient is able to complete all activities of daily living, including; climbing one flight of stairs and walking one block however, she does have mild SOB with these activities. Patient on Aspirin. Please advise.

## 2022-03-09 NOTE — PATIENT INSTRUCTIONS
Thank you for choosing our [facility] or [facility]. We are committed to your medical treatment and  care. If you need to reschedule or cancel your surgery or follow up  appointment, please call the surgery scheduler at [phone]. INSTRUCTIONS FOR SURGERY    Nothing to eat or drink after midnight the night before surgery unless surgery is at [facility] or otherwise instructed by the hospital.    DO NOT TAKE ANY ASPIRIN PRODUCTS 7 days prior to surgery-unless required by your cardiologist or primary care physician. Tylenol only. No Advil, Motrin, Aleve, or Ibuprofen    Any illegal drugs in your system (including Marijuana even if legally prescribed) will result in your surgery being cancelled. Please be sure to check with our office or the hospital on time frame for the drugs to be out of your system. Should your insurance change at any time you must contact our office. Failure to do so may result in your surgery being rescheduled. If you need paperwork filled out for work, you must give the office 2 weeks to complete and submit the forms.       [address], [facility] you a couple days prior to your surgery and give you further instructions, if any questions call them at [phone]

## 2022-03-09 NOTE — PROGRESS NOTES
Subjective:      Patient ID:  Catarina Zeng is a 78 y.o. female. HPI:    Pt with history of lifelong smoking. Complains of hoarseness, difficulty swallowing. Left preauricular pain. Also with sinus congestion and complaints. Patient's medications, allergies, past medical, surgical, social and family histories were reviewed and updated as appropriate. Review of Systems   Constitutional: Negative for activity change, fatigue and fever. HENT: Positive for ear pain, postnasal drip, rhinorrhea, sinus pressure, trouble swallowing and voice change. Negative for congestion, ear discharge, hearing loss, nosebleeds, sinus pain, sore throat and tinnitus. Respiratory: Negative for cough, choking, wheezing and stridor. Cardiovascular: Negative for chest pain. Gastrointestinal: Negative. Genitourinary: Negative. Skin: Negative for color change and rash. Neurological: Negative for speech difficulty, light-headedness, numbness and headaches. Hematological: Negative for adenopathy. Psychiatric/Behavioral: Negative for behavioral problems. Objective:   Physical Exam  Constitutional:       Appearance: Normal appearance. She is normal weight. HENT:      Head: Normocephalic and atraumatic. Right Ear: Tympanic membrane, ear canal and external ear normal. No drainage. Left Ear: Tympanic membrane, ear canal and external ear normal. No drainage. Ears:      Comments: Tenderness to palpation over left preauricular area     Nose: Nose normal. No nasal deformity or septal deviation. Mouth/Throat:      Mouth: Mucous membranes are moist.   Eyes:      General: Lids are normal. Vision grossly intact. Extraocular Movements: Extraocular movements intact. Conjunctiva/sclera: Conjunctivae normal.      Pupils: Pupils are equal, round, and reactive to light. Cardiovascular:      Rate and Rhythm: Normal rate.    Pulmonary:      Effort: Pulmonary effort is normal. Musculoskeletal:         General: Normal range of motion. Cervical back: Normal range of motion. Lymphadenopathy:      Cervical: No cervical adenopathy. Skin:     Capillary Refill: Capillary refill takes less than 2 seconds. Neurological:      Mental Status: She is alert. Psychiatric:         Mood and Affect: Mood normal.           Nasopharyngoscopy  Procedure note- after pt verbally consented, was sprayed nasally with 1:1 neosynephrine and xylocaine. Scope was passed and found nasal cavity with no lesion. nasopharynx clear, tonsil wnl without asymmetry, tongue mobile and no masses, vocal cords mobile bilaterally with full ab and ad duction. Hypopharynx clear, open and no masses. Assessment:       Diagnosis Orders   1. Hoarseness  CT SOFT TISSUE NECK W CONTRAST    BUN & Creatinine   2. Obesity, Class III, BMI 40-49.9 (morbid obesity) ([facility]     3. Chronic pansinusitis     4. DAYAN (obstructive sleep apnea)                Plan:      I recommend:    Panendoscopy, with esophagoscopy, bronchoscopy and direct laryngoscopy, microsuspension with biposy of vocal cords    The procedure risks and benefits were discussed with the patient and family. Pt and family understood and decided to proceed with the surgery. Surgical risks include:     -- Tearing of tissues is the most common problem. This is most common in esophagoscopy using a rigid scope. Tearing of the mucosa of the esophagus or hypopharynx can cause mediastinitus or a severe infection in the chest. Injury to the vocal cords can occur with laryngosopy as can injury to the lungs during bronchoscopy. Lung injury can cause air to leak into the chest cavity. If severe it can fill the chest cavity and compress the lungs producing a tension pneumothorax. Insertion of a chest tube would be emergently required to treat the patient.    - injury to lips, teeth or tongue           CT of neck     Possible   Follow up in 2 week(s)            Catarina Zeng  1955    I have discussed the case, including pertinent history and exam findings with the resident. I have seen and examined the patient and the key elements of the encounter have been performed by me. I agree with the assessment, plan and orders as documented by the  resident              Remainder of medical problems as per  resident note. Patient seen and examined. Agree with above exam, assessment and plan.       Electronically signed by Darrel Leos DO on 3/22/22 at 9:03 AM EDT

## 2022-03-10 ENCOUNTER — TELEPHONE (OUTPATIENT)
Dept: ENT CLINIC | Age: 67
End: 2022-03-10

## 2022-03-10 NOTE — TELEPHONE ENCOUNTER
[facility] to authorize order with patient insurance. Patient is scheduled for neck CT with radiology on 3/29/22 @ 11:00am. Patient has been notified of date and time and that they need to arrive at 10:30am. Patient was informed she needs to be NPO 4 hours prior to procedure. Patient instructed to park in [facility] parking lot and report to registration. Detail voicemail left for patient.      Electronically signed by Ton Levi MA on 3/10/22 at 9:26 AM EST

## 2022-03-10 NOTE — TELEPHONE ENCOUNTER
Patient's revised cardiac risk index is elevated (2), class III risk, 6.6% risk of major perioperative cardiac events. However, patient does not have any active cardiac symptoms including chest pain, decompensated heart failure, uncontrolled arrhythmias or obstructive valve lesions. She has a chronic dyspnea with exertion secondary to COPD without any recent worsening. Patient is otherwise stable and has good functional capacity. No further cardiac testing is needed prior to throat biopsy and proceed with the procedure as scheduled. Continue Coreg perioperatively. Currently, patient on antiplatelet therapy with aspirin and that can be held x7 days prior to the procedure if needed. Please call us if having any further questions or concerns.

## 2022-03-14 ENCOUNTER — TELEPHONE (OUTPATIENT)
Dept: ENT CLINIC | Age: 67
End: 2022-03-14

## 2022-03-14 NOTE — TELEPHONE ENCOUNTER
Pt was in last week, saw Dr Xochilt Irizarry, states no improvement in her bilateral ear pain. Please call her back at [phone]. Pt now states she is getting a new phone number and will have to call back. She also stated a home health nurse called and left message last Friday.   She will call back

## 2022-03-14 NOTE — TELEPHONE ENCOUNTER
I will correspond with Dr. Becca Langston in regards to bilateral ear pain and call patient back 3/15/2022

## 2022-03-14 NOTE — TELEPHONE ENCOUNTER
Called patient back to discuss current symptoms patient states wax build up bilateral, patient has used peroxide,water to soften wax.

## 2022-03-15 NOTE — TELEPHONE ENCOUNTER
Per Dr. Johny Hartley, stop using peroxide in ears. Called and left a detailed voicemail for patient.

## 2022-03-16 ENCOUNTER — TELEPHONE (OUTPATIENT)
Dept: ENT CLINIC | Age: 67
End: 2022-03-16

## 2022-03-22 ENCOUNTER — TELEPHONE (OUTPATIENT)
Dept: ADMINISTRATIVE | Age: 67
End: 2022-03-22

## 2022-03-22 NOTE — TELEPHONE ENCOUNTER
MA spoke with patient, per Dr. Catherine Andre there is nothing further he can do until the biopsy is complete. Patient is to reach out to her PCP for pain.     Electronically signed by Jayson Dacosta MA on 3/22/22 at 1:28 PM EDT

## 2022-03-22 NOTE — TELEPHONE ENCOUNTER
Patient taking Omoxicilln 500 MG from PCP  but states it is not helping her ear. Patient said her throat and ears hurt so much that she can't sleep well.      Wants to know if Dr. Brooklyn Aguilar can do anything more for her [address] 3/9/2022     Phone [phone]

## 2022-03-29 ENCOUNTER — TELEPHONE (OUTPATIENT)
Dept: ENT CLINIC | Age: 67
End: 2022-03-29

## 2022-03-29 ENCOUNTER — HOSPITAL ENCOUNTER (OUTPATIENT)
Dept: CT IMAGING | Age: 67
Discharge: HOME | End: 2022-03-31
Payer: MEDICARE

## 2022-03-29 ENCOUNTER — HOSPITAL ENCOUNTER (OUTPATIENT)
Age: 67
Discharge: HOME | End: 2022-03-29
Payer: MEDICARE

## 2022-03-29 DIAGNOSIS — R49.0 HOARSENESS: ICD-10-CM

## 2022-03-29 LAB
BUN BLDV-MCNC: 28 MG/DL (ref 6–23)
CREAT SERPL-MCNC: 1.4 MG/DL (ref 0.5–1)
GFR AFRICAN AMERICAN: 45
GFR NON-AFRICAN AMERICAN: 45 ML/MIN/1.73

## 2022-03-29 PROCEDURE — 84520 ASSAY OF UREA NITROGEN: CPT

## 2022-03-29 PROCEDURE — 36415 COLL VENOUS BLD VENIPUNCTURE: CPT

## 2022-03-29 PROCEDURE — 82565 ASSAY OF CREATININE: CPT

## 2022-03-29 RX ORDER — PREDNISONE 50 MG/1
50 TABLET ORAL 3 TIMES DAILY
Qty: 3 TABLET | Refills: 0 | Status: SHIPPED | OUTPATIENT
Start: 2022-03-29 | End: 2022-03-30

## 2022-03-29 RX ORDER — DIPHENHYDRAMINE HCL 50 MG
50 CAPSULE ORAL DAILY
Qty: 1 CAPSULE | Refills: 0 | Status: SHIPPED | OUTPATIENT
Start: 2022-03-29 | End: 2022-03-30

## 2022-03-29 NOTE — TELEPHONE ENCOUNTER
Spoke to Dr Jurgen Sawant. Patients kidney labs are high and she has transportation issues. Dr Jurgen Sawant reviewed labs-we will hold off on imaging now until after surgery. Patient does not need premedication at this time. Patient aware of recommendations.

## 2022-03-29 NOTE — TELEPHONE ENCOUNTER
CT scan soft tissue neck scheduled for 3/29/22 @ 11am, and not able to complete due to patient is allergic to IV dye. Radiologist, CT dept, patient requesting Rx for IV dye allergy protocol. Please advise patient [phone]. Patient scheduled for surgery 4/4/22 and needs to have CT done prior.

## 2022-03-31 ENCOUNTER — TELEPHONE (OUTPATIENT)
Dept: ENT CLINIC | Age: 67
End: 2022-03-31

## 2022-03-31 DIAGNOSIS — J30.1 SEASONAL ALLERGIC RHINITIS DUE TO POLLEN: Primary | ICD-10-CM

## 2022-03-31 NOTE — TELEPHONE ENCOUNTER
Dr. Britta Ware office LVM stating they are to review patient's lab results to provide surgical clearance and requesting copy of lab results. Patient's surgery scheduled for 4/4/22. MA printed and faxed results.      Electronically signed by Nam Rm on 3/31/22 at 10:24 AM EDT

## 2022-03-31 NOTE — PROGRESS NOTES
Have you been tested for COVID  Yes           Have you been told you were positive for COVID Yes  Have you had any known exposure to someone that is positive for COVID No  Do you have a cough                   No              Do you have shortness of breath No                 Do you have a sore throat            No                Are you having chills                    No                Are you having muscle aches. No                    Please come to the hospital wearing a mask and have your significant other wear a mask as well. Both of you should check your temperature before leaving to come here,  if it is 100 or higher please call [phone] for instruction. [facility] PRE-ADMISSION TESTING INSTRUCTIONS    The Preadmission Testing patient is instructed accordingly using the following criteria (check applicable):    ARRIVAL INSTRUCTIONS:  [x] Parking the day of Surgery is located in the Main Entrance lot. Upon entering the door, make an immediate right to the surgery reception desk    [x] Bring photo ID and insurance card    [] Bring in a copy of Living will or Durable Power of  papers.     [x] Please be sure to arrange for responsible adult to provide transportation to and from the hospital    [x] Please arrange for responsible adult to be with you for the 24 hour period post procedure due to having anesthesia      GENERAL INSTRUCTIONS:    [x] Nothing by mouth after midnight, including gum, candy, mints or water    [x] You may brush your teeth, but do not swallow any water    [x] Take medications as instructed with 1-2 oz of water    [] Stop herbal supplements and vitamins 5 days prior to procedure    [] Follow preop dosing of blood thinners per physician instructions    [x] Take 1/2 dose of evening insulin, but no insulin after midnight    [x] No oral diabetic medications after midnight    [x] If diabetic and have low blood sugar or feel symptomatic, take 1-2oz apple juice only    [] Bring inhalers day of surgery    [] Bring C-PAP/ Bi-Pap day of surgery    [] Bring urine specimen day of surgery    [x] Shower or bath with soap, lather and rinse well, AM of Surgery, no lotion, powders or creams to surgical site    [] Follow bowel prep as instructed per surgeon    [x] No tobacco products within 24 hours of surgery     [x] No alcohol or illegal drug use within 24 hours of surgery.     [x] Jewelry, body piercing's, eyeglasses, contact lenses and dentures are not permitted into surgery (bring cases)      [x] Please do not wear any nail polish, make up or hair products on the day of surgery    [x] You can expect a call the business day prior to procedure to notify you if your arrival time changes    [x] If you receive a survey after surgery we would greatly appreciate your comments    [] Parent/guardian of a minor must accompany their child and remain on the premises  the entire time they are under our care     [] Pediatric patients may bring favorite toy, blanket or comfort item with them    [] A caregiver or family member must remain with the patient during their stay if they are mentally handicapped, have dementia, disoriented or unable to use a call light or would be a safety concern if left unattended    [x] Please notify surgeon if you develop any illness between now and time of surgery (cold, cough, sore throat, fever, nausea, vomiting) or any signs of infections  including skin, wounds, and dental.    [x]  The Outpatient Pharmacy is available to fill your prescription here on your day of surgery, ask your preop nurse for details    [] Other instructions    EDUCATIONAL MATERIALS PROVIDED:    [] PAT Preoperative Education Packet/Booklet     [] Medication List    [] Transfusion bracelet applied with instructions    [] Shower with soap, lather and rinse well, and use CHG wipes provided the evening before surgery as instructed    [] Incentive spirometer with instructions

## 2022-04-01 RX ORDER — FLUTICASONE PROPIONATE 50 MCG
SPRAY, SUSPENSION (ML) NASAL
Qty: 1 EACH | Refills: 5 | Status: SHIPPED
Start: 2022-04-01 | End: 2022-04-11

## 2022-04-03 ENCOUNTER — ANESTHESIA EVENT (OUTPATIENT)
Dept: OPERATING ROOM | Age: 67
End: 2022-04-03
Payer: MEDICARE

## 2022-04-03 NOTE — H&P
Otolaryngology  History and Physical  Subjective:      Patient ID:  Rafy Sands is a 78 y.o. female.     HPI:     Pt with history of lifelong smoking. Complains of hoarseness, difficulty swallowing. Left preauricular pain. Also with sinus congestion and complaints.      Patient's medications, allergies, past medical, surgical, social and family histories were reviewed and updated as appropriate.           Review of Systems   Constitutional: Negative for activity change, fatigue and fever. HENT: Positive for ear pain, postnasal drip, rhinorrhea, sinus pressure, trouble swallowing and voice change. Negative for congestion, ear discharge, hearing loss, nosebleeds, sinus pain, sore throat and tinnitus. Respiratory: Negative for cough, choking, wheezing and stridor. Cardiovascular: Negative for chest pain. Gastrointestinal: Negative. Genitourinary: Negative. Skin: Negative for color change and rash. Neurological: Negative for speech difficulty, light-headedness, numbness and headaches. Hematological: Negative for adenopathy. Psychiatric/Behavioral: Negative for behavioral problems.                  Objective:   Physical Exam  Constitutional:       Appearance: Normal appearance. She is normal weight. HENT:      Head: Normocephalic and atraumatic. Right Ear: Tympanic membrane, ear canal and external ear normal. No drainage. Left Ear: Tympanic membrane, ear canal and external ear normal. No drainage. Ears:      Comments: Tenderness to palpation over left preauricular area     Nose: Nose normal. No nasal deformity or septal deviation. Mouth/Throat:      Mouth: Mucous membranes are moist.   Eyes:      General: Lids are normal. Vision grossly intact. Extraocular Movements: Extraocular movements intact. Conjunctiva/sclera: Conjunctivae normal.      Pupils: Pupils are equal, round, and reactive to light. Cardiovascular:      Rate and Rhythm: Normal rate.    Pulmonary: Effort: Pulmonary effort is normal.   Musculoskeletal:         General: Normal range of motion. Cervical back: Normal range of motion. Lymphadenopathy:      Cervical: No cervical adenopathy. Skin:     Capillary Refill: Capillary refill takes less than 2 seconds. Neurological:      Mental Status: She is alert. Psychiatric:         Mood and Affect: Mood normal.               Nasopharyngoscopy  Procedure note- after pt verbally consented, was sprayed nasally with 1:1 neosynephrine and xylocaine. Scope was passed and found nasal cavity with no lesion. nasopharynx clear, tonsil wnl without asymmetry, tongue mobile and no masses, vocal cords mobile bilaterally with full ab and ad duction. Hypopharynx clear, open and no masses.                            Assessment:        Diagnosis Orders   1. Hoarseness  CT SOFT TISSUE NECK W CONTRAST     BUN & Creatinine   2. Obesity, Class III, BMI 40-49.9 (morbid obesity) ([facility] [address]      3. Chronic pansinusitis      4. DAYAN (obstructive sleep apnea)                             Plan:      I recommend:     Panendoscopy, with esophagoscopy, bronchoscopy and direct laryngoscopy, microsuspension with biposy of vocal cords     The procedure risks and benefits were discussed with the patient and family. Pt and family understood and decided to proceed with the surgery.        Surgical risks include:     -- Tearing of tissues is the most common problem. This is most common in esophagoscopy using a rigid scope. Tearing of the mucosa of the esophagus or hypopharynx can cause mediastinitus or a severe infection in the chest. Injury to the vocal cords can occur with laryngosopy as can injury to the lungs during bronchoscopy. Lung injury can cause air to leak into the chest cavity. If severe it can fill the chest cavity and compress the lungs producing a tension pneumothorax. Insertion of a chest tube would be emergently required to treat the patient.    - injury to lips, teeth or tongue

## 2022-04-04 ENCOUNTER — HOSPITAL ENCOUNTER (OUTPATIENT)
Age: 67
Setting detail: OUTPATIENT SURGERY
Discharge: HOME | End: 2022-04-04
Attending: OTOLARYNGOLOGY | Admitting: OTOLARYNGOLOGY
Payer: MEDICARE

## 2022-04-04 ENCOUNTER — ANESTHESIA (OUTPATIENT)
Dept: OPERATING ROOM | Age: 67
End: 2022-04-04
Payer: MEDICARE

## 2022-04-04 VITALS
SYSTOLIC BLOOD PRESSURE: 166 MMHG | OXYGEN SATURATION: 90 % | DIASTOLIC BLOOD PRESSURE: 72 MMHG | TEMPERATURE: 59.7 F | RESPIRATION RATE: 16 BRPM

## 2022-04-04 VITALS
SYSTOLIC BLOOD PRESSURE: 141 MMHG | WEIGHT: 220 LBS | RESPIRATION RATE: 18 BRPM | TEMPERATURE: 96.7 F | OXYGEN SATURATION: 99 % | HEIGHT: 62 IN | DIASTOLIC BLOOD PRESSURE: 73 MMHG | BODY MASS INDEX: 40.48 KG/M2 | HEART RATE: 63 BPM

## 2022-04-04 LAB
ANION GAP SERPL CALCULATED.3IONS-SCNC: 9 MMOL/L (ref 7–16)
BUN BLDV-MCNC: 31 MG/DL (ref 6–23)
CALCIUM SERPL-MCNC: 9.3 MG/DL (ref 8.6–10.2)
CHLORIDE BLD-SCNC: 105 MMOL/L (ref 98–107)
CO2: 25 MMOL/L (ref 22–29)
CREAT SERPL-MCNC: 1.4 MG/DL (ref 0.5–1)
GFR AFRICAN AMERICAN: 45
GFR NON-AFRICAN AMERICAN: 45 ML/MIN/1.73
GLUCOSE BLD-MCNC: 194 MG/DL (ref 74–99)
HCT VFR BLD CALC: 38.7 % (ref 34–48)
HEMOGLOBIN: 12.2 G/DL (ref 11.5–15.5)
MCH RBC QN AUTO: 31.9 PG (ref 26–35)
MCHC RBC AUTO-ENTMCNC: 31.5 % (ref 32–34.5)
MCV RBC AUTO: 101.3 FL (ref 80–99.9)
PDW BLD-RTO: 12.1 FL (ref 11.5–15)
PLATELET # BLD: 222 E9/L (ref 130–450)
PMV BLD AUTO: 10.5 FL (ref 7–12)
POTASSIUM SERPL-SCNC: 4.7 MMOL/L (ref 3.5–5)
RBC # BLD: 3.82 E12/L (ref 3.5–5.5)
SODIUM BLD-SCNC: 139 MMOL/L (ref 132–146)
WBC # BLD: 7.2 E9/L (ref 4.5–11.5)

## 2022-04-04 PROCEDURE — 7100000001 HC PACU RECOVERY - ADDTL 15 MIN: Performed by: OTOLARYNGOLOGY

## 2022-04-04 PROCEDURE — 93005 ELECTROCARDIOGRAM TRACING: CPT

## 2022-04-04 PROCEDURE — 2720000010 HC SURG SUPPLY STERILE: Performed by: OTOLARYNGOLOGY

## 2022-04-04 PROCEDURE — 6360000002 HC RX W HCPCS

## 2022-04-04 PROCEDURE — 3600000012 HC SURGERY LEVEL 2 ADDTL 15MIN: Performed by: OTOLARYNGOLOGY

## 2022-04-04 PROCEDURE — 85027 COMPLETE CBC AUTOMATED: CPT

## 2022-04-04 PROCEDURE — 2500000003 HC RX 250 WO HCPCS: Performed by: NURSE ANESTHETIST, CERTIFIED REGISTERED

## 2022-04-04 PROCEDURE — 3700000001 HC ADD 15 MINUTES (ANESTHESIA): Performed by: OTOLARYNGOLOGY

## 2022-04-04 PROCEDURE — 7100000010 HC PHASE II RECOVERY - FIRST 15 MIN: Performed by: OTOLARYNGOLOGY

## 2022-04-04 PROCEDURE — 3600000002 HC SURGERY LEVEL 2 BASE: Performed by: OTOLARYNGOLOGY

## 2022-04-04 PROCEDURE — 2709999900 HC NON-CHARGEABLE SUPPLY: Performed by: OTOLARYNGOLOGY

## 2022-04-04 PROCEDURE — 2580000003 HC RX 258

## 2022-04-04 PROCEDURE — 7100000011 HC PHASE II RECOVERY - ADDTL 15 MIN: Performed by: OTOLARYNGOLOGY

## 2022-04-04 PROCEDURE — 80048 BASIC METABOLIC PNL TOTAL CA: CPT

## 2022-04-04 PROCEDURE — 6370000000 HC RX 637 (ALT 250 FOR IP): Performed by: OTOLARYNGOLOGY

## 2022-04-04 PROCEDURE — 88305 TISSUE EXAM BY PATHOLOGIST: CPT

## 2022-04-04 PROCEDURE — 7100000000 HC PACU RECOVERY - FIRST 15 MIN: Performed by: OTOLARYNGOLOGY

## 2022-04-04 PROCEDURE — 2500000003 HC RX 250 WO HCPCS

## 2022-04-04 PROCEDURE — 36415 COLL VENOUS BLD VENIPUNCTURE: CPT

## 2022-04-04 PROCEDURE — 3700000000 HC ANESTHESIA ATTENDED CARE: Performed by: OTOLARYNGOLOGY

## 2022-04-04 PROCEDURE — 31541 LARYNSCOP W/TUMR EXC + SCOPE: CPT | Performed by: OTOLARYNGOLOGY

## 2022-04-04 RX ORDER — ONDANSETRON 2 MG/ML
4 INJECTION INTRAMUSCULAR; INTRAVENOUS
Status: DISCONTINUED | OUTPATIENT
Start: 2022-04-04 | End: 2022-04-04 | Stop reason: HOSPADM

## 2022-04-04 RX ORDER — FENTANYL CITRATE 50 UG/ML
INJECTION, SOLUTION INTRAMUSCULAR; INTRAVENOUS PRN
Status: DISCONTINUED | OUTPATIENT
Start: 2022-04-04 | End: 2022-04-04 | Stop reason: SDUPTHER

## 2022-04-04 RX ORDER — SODIUM CHLORIDE 9 MG/ML
25 INJECTION, SOLUTION INTRAVENOUS PRN
Status: DISCONTINUED | OUTPATIENT
Start: 2022-04-04 | End: 2022-04-04 | Stop reason: HOSPADM

## 2022-04-04 RX ORDER — SODIUM CHLORIDE 9 MG/ML
INJECTION, SOLUTION INTRAVENOUS PRN
Status: DISCONTINUED | OUTPATIENT
Start: 2022-04-04 | End: 2022-04-04 | Stop reason: HOSPADM

## 2022-04-04 RX ORDER — ONDANSETRON 2 MG/ML
INJECTION INTRAMUSCULAR; INTRAVENOUS PRN
Status: DISCONTINUED | OUTPATIENT
Start: 2022-04-04 | End: 2022-04-04 | Stop reason: SDUPTHER

## 2022-04-04 RX ORDER — FENTANYL CITRATE 50 UG/ML
50 INJECTION, SOLUTION INTRAMUSCULAR; INTRAVENOUS EVERY 5 MIN PRN
Status: DISCONTINUED | OUTPATIENT
Start: 2022-04-04 | End: 2022-04-04 | Stop reason: HOSPADM

## 2022-04-04 RX ORDER — MIDAZOLAM HYDROCHLORIDE 1 MG/ML
INJECTION INTRAMUSCULAR; INTRAVENOUS PRN
Status: DISCONTINUED | OUTPATIENT
Start: 2022-04-04 | End: 2022-04-04 | Stop reason: SDUPTHER

## 2022-04-04 RX ORDER — LIDOCAINE HYDROCHLORIDE 20 MG/ML
INJECTION, SOLUTION EPIDURAL; INFILTRATION; INTRACAUDAL; PERINEURAL PRN
Status: DISCONTINUED | OUTPATIENT
Start: 2022-04-04 | End: 2022-04-04 | Stop reason: SDUPTHER

## 2022-04-04 RX ORDER — SODIUM CHLORIDE 9 MG/ML
INJECTION, SOLUTION INTRAVENOUS CONTINUOUS PRN
Status: DISCONTINUED | OUTPATIENT
Start: 2022-04-04 | End: 2022-04-04 | Stop reason: SDUPTHER

## 2022-04-04 RX ORDER — PROPOFOL 10 MG/ML
INJECTION, EMULSION INTRAVENOUS PRN
Status: DISCONTINUED | OUTPATIENT
Start: 2022-04-04 | End: 2022-04-04 | Stop reason: SDUPTHER

## 2022-04-04 RX ORDER — DEXAMETHASONE SODIUM PHOSPHATE 4 MG/ML
INJECTION, SOLUTION INTRA-ARTICULAR; INTRALESIONAL; INTRAMUSCULAR; INTRAVENOUS; SOFT TISSUE PRN
Status: DISCONTINUED | OUTPATIENT
Start: 2022-04-04 | End: 2022-04-04 | Stop reason: SDUPTHER

## 2022-04-04 RX ORDER — SODIUM CHLORIDE 0.9 % (FLUSH) 0.9 %
10 SYRINGE (ML) INJECTION EVERY 12 HOURS SCHEDULED
Status: DISCONTINUED | OUTPATIENT
Start: 2022-04-04 | End: 2022-04-04 | Stop reason: HOSPADM

## 2022-04-04 RX ORDER — SODIUM CHLORIDE, SODIUM LACTATE, POTASSIUM CHLORIDE, CALCIUM CHLORIDE 600; 310; 30; 20 MG/100ML; MG/100ML; MG/100ML; MG/100ML
INJECTION, SOLUTION INTRAVENOUS CONTINUOUS
Status: DISCONTINUED | OUTPATIENT
Start: 2022-04-04 | End: 2022-04-04 | Stop reason: HOSPADM

## 2022-04-04 RX ORDER — ROCURONIUM BROMIDE 10 MG/ML
INJECTION, SOLUTION INTRAVENOUS PRN
Status: DISCONTINUED | OUTPATIENT
Start: 2022-04-04 | End: 2022-04-04 | Stop reason: SDUPTHER

## 2022-04-04 RX ORDER — SUCCINYLCHOLINE/SOD CL,ISO/PF 200MG/10ML
SYRINGE (ML) INTRAVENOUS PRN
Status: DISCONTINUED | OUTPATIENT
Start: 2022-04-04 | End: 2022-04-04 | Stop reason: SDUPTHER

## 2022-04-04 RX ORDER — FENTANYL CITRATE 50 UG/ML
25 INJECTION, SOLUTION INTRAMUSCULAR; INTRAVENOUS EVERY 5 MIN PRN
Status: DISCONTINUED | OUTPATIENT
Start: 2022-04-04 | End: 2022-04-04 | Stop reason: HOSPADM

## 2022-04-04 RX ORDER — SODIUM CHLORIDE 0.9 % (FLUSH) 0.9 %
5-40 SYRINGE (ML) INJECTION EVERY 12 HOURS SCHEDULED
Status: DISCONTINUED | OUTPATIENT
Start: 2022-04-04 | End: 2022-04-04 | Stop reason: HOSPADM

## 2022-04-04 RX ORDER — SODIUM CHLORIDE 0.9 % (FLUSH) 0.9 %
10 SYRINGE (ML) INJECTION PRN
Status: DISCONTINUED | OUTPATIENT
Start: 2022-04-04 | End: 2022-04-04 | Stop reason: HOSPADM

## 2022-04-04 RX ORDER — EPINEPHRINE NASAL SOLUTION 1 MG/ML
SOLUTION NASAL PRN
Status: DISCONTINUED | OUTPATIENT
Start: 2022-04-04 | End: 2022-04-04 | Stop reason: ALTCHOICE

## 2022-04-04 RX ORDER — MEPERIDINE HYDROCHLORIDE 25 MG/ML
12.5 INJECTION INTRAMUSCULAR; INTRAVENOUS; SUBCUTANEOUS EVERY 5 MIN PRN
Status: DISCONTINUED | OUTPATIENT
Start: 2022-04-04 | End: 2022-04-04 | Stop reason: HOSPADM

## 2022-04-04 RX ORDER — SODIUM CHLORIDE 0.9 % (FLUSH) 0.9 %
5-40 SYRINGE (ML) INJECTION PRN
Status: DISCONTINUED | OUTPATIENT
Start: 2022-04-04 | End: 2022-04-04 | Stop reason: HOSPADM

## 2022-04-04 RX ADMIN — PROPOFOL 180 MG: 10 INJECTION, EMULSION INTRAVENOUS at 08:21

## 2022-04-04 RX ADMIN — SUGAMMADEX 500 MG: 100 INJECTION, SOLUTION INTRAVENOUS at 08:55

## 2022-04-04 RX ADMIN — ROCURONIUM BROMIDE 20 MG: 10 INJECTION, SOLUTION INTRAVENOUS at 08:27

## 2022-04-04 RX ADMIN — LIDOCAINE HYDROCHLORIDE 100 MG: 20 INJECTION, SOLUTION EPIDURAL; INFILTRATION; INTRACAUDAL; PERINEURAL at 08:21

## 2022-04-04 RX ADMIN — Medication 100 MG: at 08:21

## 2022-04-04 RX ADMIN — ONDANSETRON 4 MG: 2 INJECTION INTRAMUSCULAR; INTRAVENOUS at 08:21

## 2022-04-04 RX ADMIN — DEXAMETHASONE SODIUM PHOSPHATE 10 MG: 4 INJECTION INTRA-ARTICULAR; INTRALESIONAL; INTRAMUSCULAR; INTRAVENOUS; SOFT TISSUE at 08:21

## 2022-04-04 RX ADMIN — FENTANYL CITRATE 100 MCG: 50 INJECTION, SOLUTION INTRAMUSCULAR; INTRAVENOUS at 08:21

## 2022-04-04 RX ADMIN — ROCURONIUM BROMIDE 10 MG: 10 INJECTION, SOLUTION INTRAVENOUS at 08:21

## 2022-04-04 RX ADMIN — MIDAZOLAM 1 MG: 1 INJECTION INTRAMUSCULAR; INTRAVENOUS at 08:21

## 2022-04-04 RX ADMIN — SODIUM CHLORIDE: 9 INJECTION, SOLUTION INTRAVENOUS at 08:07

## 2022-04-04 ASSESSMENT — PULMONARY FUNCTION TESTS
PIF_VALUE: 36
PIF_VALUE: 0
PIF_VALUE: 35
PIF_VALUE: 34
PIF_VALUE: 2
PIF_VALUE: 0
PIF_VALUE: 0
PIF_VALUE: 35
PIF_VALUE: 36
PIF_VALUE: 0
PIF_VALUE: 34
PIF_VALUE: 3
PIF_VALUE: 0
PIF_VALUE: 1
PIF_VALUE: 0
PIF_VALUE: 35
PIF_VALUE: 36
PIF_VALUE: 34
PIF_VALUE: 24
PIF_VALUE: 36
PIF_VALUE: 35
PIF_VALUE: 2
PIF_VALUE: 36
PIF_VALUE: 4
PIF_VALUE: 35
PIF_VALUE: 36
PIF_VALUE: 10
PIF_VALUE: 26
PIF_VALUE: 34
PIF_VALUE: 35
PIF_VALUE: 0
PIF_VALUE: 35
PIF_VALUE: 1
PIF_VALUE: 35
PIF_VALUE: 36
PIF_VALUE: 0
PIF_VALUE: 23
PIF_VALUE: 34
PIF_VALUE: 0
PIF_VALUE: 35
PIF_VALUE: 36
PIF_VALUE: 2
PIF_VALUE: 36
PIF_VALUE: 3
PIF_VALUE: 0
PIF_VALUE: 0
PIF_VALUE: 36
PIF_VALUE: 35
PIF_VALUE: 36
PIF_VALUE: 5
PIF_VALUE: 35
PIF_VALUE: 35
PIF_VALUE: 36
PIF_VALUE: 0
PIF_VALUE: 5
PIF_VALUE: 23
PIF_VALUE: 35
PIF_VALUE: 35
PIF_VALUE: 0
PIF_VALUE: 0

## 2022-04-04 ASSESSMENT — LIFESTYLE VARIABLES: SMOKING_STATUS: 1

## 2022-04-04 ASSESSMENT — COPD QUESTIONNAIRES: CAT_SEVERITY: MODERATE

## 2022-04-04 ASSESSMENT — PAIN - FUNCTIONAL ASSESSMENT: PAIN_FUNCTIONAL_ASSESSMENT: 0-10

## 2022-04-04 NOTE — ANESTHESIA PRE PROCEDURE
Department of Anesthesiology  Preprocedure Note       Name:  Devora Cheung   Age:  78 y.o.  :  1955                                          MRN:  [id number]         Date:  2022      Surgeon: Aspen Leigh):  Maria De Jesus Diaz DO    Procedure: Procedure(s):  DIRECT LARYNGOSCOPY WITH BIOPSY    Medications prior to admission:   Prior to Admission medications    Medication Sig Start Date End Date Taking?  Authorizing Provider   fluticasone (FLONASE) 50 MCG/ACT nasal spray SPRAY 2 SPRAYS INTO EACH NOSTRIL EVERY DAY 22   Maria De Jesus Diaz DO   ENTRESTO 49-51 MG per tablet TAKE 1 TABLET BY MOUTH TWICE A DAY 22   Camden Clarke MD   bumetanide (BUMEX) 1 MG tablet Take 1 tablet by mouth daily 10/25/21   Camden Clarke MD   spironolactone (ALDACTONE) 25 MG tablet Take 1 tablet by mouth daily 9/15/21   Camden Clarke MD   simvastatin (ZOCOR) 20 MG tablet TAKE 1 TABLET BY MOUTH NIGHTLY 9/15/21   Camden Clarke MD   isosorbide mononitrate (IMDUR) 60 MG extended release tablet TAKE (1) TABLET BY MOUTH DAILY 9/15/21   Camden Clarke MD   carvedilol (COREG) 25 MG tablet Take 1 tablet by mouth 2 times daily (with meals) 9/15/21   Camden Clarke MD   hydrALAZINE (APRESOLINE) 50 MG tablet Take 50 mg by mouth 3 times daily    Historical Provider, MD   nystatin (MYCOSTATIN) 642511 UNIT/ML suspension Take 5 mLs by mouth 4 times daily 21   Jordan Justin DO   Arformoterol Tartrate (BROVANA) 15 MCG/2ML NEBU Take 2 mLs by nebulization 2 times daily 21   Tana Justin DO   ipratropium-albuterol (DUONEB) 0.5-2.5 (3) MG/3ML SOLN nebulizer solution Inhale 3 mLs into the lungs every 4 hours (while awake) 21   Tana Justin DO   pantoprazole (PROTONIX) 40 MG tablet TAKE 1 TABLET BY MOUTH EVERY DAY 21   Historical Provider, MD   aspirin (ASPIRIN LOW DOSE) 81 MG chewable tablet TAKE 1 TABLET BY MOUTH EVERY DAY 21   Troy Hayes DO   DULoxetine (CYMBALTA) 60 MG extended release capsule TAKE ONE (1) CAPSULE BY MOUTH TWICE DAILY 6/2/21   Reid Libman Mellott, DO   montelukast (SINGULAIR) 10 MG tablet TAKE (1) TABLET BY MOUTH NIGHTLY 6/1/21   Reid Libman Mellott, DO   MYRBETRIQ 50 MG TB24 TAKE 1 TABLET BY MOUTH EVERY DAY 5/10/21   Reid Libman Mellott, DO   metoclopramide (REGLAN) 5 MG tablet Take 1 tablet by mouth 3 times daily 5/5/21   Reid Libman Mellott, DO   gabapentin (NEURONTIN) 100 MG capsule Take 1 capsule by mouth 2 times daily for 180 days.  Intended supply: 30 days 5/4/21 10/31/21  Reid Libman Mellott, DO   Acetaminophen (TYLENOL) 325 MG CAPS Take 325 mg by mouth every 6 hours as needed (pain) 5/4/21   Reid Libman Mellott, DO   insulin lispro, 1 Unit Dial, ([facility]) 100 UNIT/ML SOPN Use sliding scale as below TID with meals Sugar  0 units Sugar 131-180 2 units Sugar 181-240 4 units Sugar 241-300 6 units Sugar 301-350 8 units Sugar 351-400 10 units Sugar > 400 12 units Call above 500 4/29/21   Reid Libman Mellott, DO   vitamin D3 (CHOLECALCIFEROL) 25 MCG (1000 UT) TABS tablet TAKE 1 TABLET BY MOUTH EVERY DAY 4/26/21   Reid Libman Mellott, DO   Insulin Degludec (TRESIBA FLEXTOUCH) 200 UNIT/ML SOPN INJECT 50 UNITS INTO THE SKIN nightly 4/15/21   Reid Libman Mellott, DO   cetirizine (ZYRTEC) 10 MG tablet TAKE 1 TABLET BY MOUTH EVERY DAY 4/13/21   Reid Libman Mellott, DO   docusate sodium (COLACE) 100 MG capsule TAKE 2 CAPSULES BY MOUTH AT BEDTIME 3/11/21   Historical Provider, MD   SPIRIVA RESPIMAT 1.25 MCG/ACT AERS inhaler INHALE TWO (2) PUFFS BY MOUTH EVERY DAY 11/24/20   Reid Libman Mellott, DO   traZODone (DESYREL) 100 MG tablet TAKE (1) TABLET BY MOUTH NIGHTLY 10/13/20   Mani Libman Freddy, DO   azelastine (ASTELIN) 0.1 % nasal spray 1 spray by Nasal route 2 times daily Use in each nostril as directed 9/16/20   Francee Osgood Pascolini, DO   Handicap Placard MISC by Does not apply route Patient cannot walk 200 ft without stopping to rest.    Expiration 8/2022 7/30/20 Zahida Tamez,    BiPAP Machine MISC by Does not apply route nightly 27/23    William Ricks MD       Current medications:    Current Facility-Administered Medications   Medication Dose Route Frequency Provider Last Rate Last Admin    lactated ringers infusion   IntraVENous Continuous Perla Naik DO        sodium chloride flush 0.9 % injection 10 mL  10 mL IntraVENous 2 times per day Perla Naik, DO        sodium chloride flush 0.9 % injection 10 mL  10 mL IntraVENous PRN Perla Naik DO        0.9 % sodium chloride infusion  25 mL IntraVENous PRN Perla Naik DO           Allergies:     Allergies   Allergen Reactions    Latex Hives    Bee Venom Anaphylaxis    Dilaudid [Hydromorphone Hcl] Itching    Dye [Iodides] Hives and Shortness Of Breath    Percocet [Oxycodone-Acetaminophen] Shortness Of Breath and Itching    Keflex [Cephalexin] Itching and Rash    Lasix [Furosemide] Other (See Comments)     Pt states she cramps up and gets headaches    Levaquin [Levofloxacin In D5w] Hives    Lipitor      MUSCLE SPASMS    Lyrica [Pregabalin]      Dream disturbances    Morphine Hives    Naproxen      Unsure of reaction;pt states able to take Aleve without difficulties    Nefazodone Other (See Comments)    Norvasc [Amlodipine] Swelling    Oxycodone-Acetaminophen Swelling    Shellfish-Derived Products     Trazodone And Nefazodone        Problem List:    Patient Active Problem List   Diagnosis Code    Morbid obesity due to excess calories (HCC) E66.01    Hyperlipidemia E78.5    DAYAN and COPD overlap syndrome (HCC) G47.33, J44.9    Vitamin D insufficiency E55.9    Chronic combined systolic and diastolic heart failure (HCC) I50.42    GERD (gastroesophageal reflux disease) K21.9    Major depressive disorder, recurrent episode, mild (HCC) F33.0    Diabetic polyneuropathy associated with type 2 diabetes mellitus (HCC) E11.42    Chronic passive hepatic congestion K76.1    Mixed incontinence urge and stress N39.46    Chronic back pain - d/t muscle spasm M54.9, G89.29    Glaucoma, open angle H40.10X0    Elevated CA 19-9 level R97.8    Lumbar stenosis M48.061    Spondylosis 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 M47.816    Lumbar disc herniation M51.26    Asymmetric septal hypertrophy ([facility]) I42.2    Non-compliance Z91.19    Hiatal hernia K44.9    Melanosis coli K63.89    Coronary artery disease involving native coronary artery of native heart without angina pectoris I25.10    DM2 (diabetes mellitus, type 2) ([facility]) E11.9    Cigarette smoker F17.210    Marijuana use, smoked F12.90    Ischemic cardiomyopathy I25.5    PVD (peripheral vascular disease) with claudication ([facility]) I73.9    Chronic venous insufficiency I87.2    History of non-ST elevation myocardial infarction (NSTEMI) I25.2    QT prolongation R94.31    History of stroke Z86.73    Chronic respiratory failure with hypoxia and hypercapnia ([facility]) J96.11, J96.12    COPD (chronic obstructive pulmonary disease) ([facility]) J44.9    Status post peripheral artery angioplasty Z98.62    Uncontrolled hypertension I10    O2 dependent Z99.81       Past Medical History:        Diagnosis Date    Asthma     Atherosclerosis of native artery of left leg with rest pain ([address] 11/09/2018    CAD (coronary artery disease) 2011    Cellulitis and abscess of trunk 04/14/2015    Cerebellar infarct ([address] 04/03/2019    Remote, rt. cerebellum, head CT scan, 1/9/19    Chronic back pain     Chronic kidney disease     Chronic systolic congestive heart failure ([address] 10/04/2013    Chronic venous insufficiency 10/11/2017    COPD (chronic obstructive pulmonary disease) ([address]     COVID-19     Depression     Diabetes mellitus (HCC)     Diabetic neuropathy ([address]     Diverticulosis     Fatty liver 01/08/2016    per US    Glaucoma, open angle 02/01/2016    Mild-OU    Hepatic encephalopathy ([address] 02/07/2016    resolved    Hiatal hernia     History of blood transfusion     Hyperlipidemia     Hyperplastic colon polyp     Hypertension     Incontinence     Liver mass     Morbid obesity ([address]     Movement disorder     Myocardial infarction ([address] 2011    O2 dependent 09/16/2021    with bipap 3 liters    Osteoarthritis, generalized     Pain in both lower legs 10/11/2017    Peripheral vascular disease ([address]     Pneumonia 01/26/2014    Pulmonary edema     resolved    PVD (peripheral vascular disease) with claudication ([address] 08/30/2017    Sleep apnea     uses BI PAP    Status post peripheral artery angioplasty 10/31/2019    Tobacco abuse     Tobacco abuse 10/11/2017    Tubular adenoma polyp of rectum     Urinary tract infection due to ESBL Klebsiella 03/08/2017    Ventral hernia        Past Surgical History:        Procedure Laterality Date    ANGIOPLASTY  11/13/2018    Dr. Rutherford Nurse & athrectomy L SFA & Popliteal    BREAST SURGERY  2000    bilateral reduction    BRONCHIAL BRUSH BIOPSY  1/25/2013    Dr Tali Caicedo  04/20/2015    Dr. Emil Oconnell  12/2011     DR. Anna Bone,  follows Dr Tamie Bundy  11/15/2013    Dr Hermilo Cronin COLONOSCOPY  12/23/2016    Dr Omer Daily adenoma & hyperplastic polyps, melanosis coli (repeat one year 12/2017)    CORONARY ARTERY BYPASS GRAFT      ECHO COMPLETE  10/9/2013         ENDOSCOPY, COLON, DIAGNOSTIC  04/18/2017    GALLBLADDER SURGERY      gallstones removed, [facility] 8/2017    HYSTERECTOMY      JOINT REPLACEMENT  2011    LEFT KNEE    KNEE SURGERY      left knee replacement    LAYER WOUND CLOSURE Left [id number]    LIVER BIOPSY  1/8/2016     E's    POLYSOMNOGRAPHY  1/2016    [facility]    UPPER GASTROINTESTINAL ENDOSCOPY  12/20/12    UPPER GASTROINTESTINAL ENDOSCOPY  12/23/2016    Dr Hermilo Cronin UPPER GASTROINTESTINAL ENDOSCOPY  02/08/2017       Social History:    Social History     Tobacco Use    Smoking status: Current Every Day Smoker     Packs/day: 0.25     Years: 40.00     Pack years: 10.00     Types: Cigarettes     Start date: 8/10/1974    Smokeless tobacco: Never Used    Tobacco comment: 1 pack every 3 days (6-7 cig)   Substance Use Topics    Alcohol use: Yes     Alcohol/week: 0.0 standard drinks     Comment: social                                Ready to quit: Not Answered  Counseling given: Not Answered  Comment: 1 pack every 3 days (6-7 cig)      Vital Signs (Current):   Vitals:    03/31/22 1227 04/04/22 0640 04/04/22 0700   BP:   (!) 140/73   Pulse:   75   Resp:   20   Temp:   97.1 °F (36.2 °C)   TempSrc:   Temporal   SpO2:   92%   Weight: 220 lb (99.8 kg) 220 lb (99.8 kg)    Height: 5' 2\" (1.575 m) 5' 2\" (1.575 m)                                               BP Readings from Last 3 Encounters:   04/04/22 (!) 140/73   03/09/22 (!) 145/78   09/15/21 108/72       NPO Status:                                                                                 BMI:   Wt Readings from Last 3 Encounters:   04/04/22 220 lb (99.8 kg)   03/09/22 220 lb (99.8 kg)   09/16/21 222 lb (100.7 kg)     Body mass index is 40.24 kg/m². CBC:   Lab Results   Component Value Date    WBC 7.2 04/04/2022    RBC 3.82 04/04/2022    HGB 12.2 04/04/2022    HCT 38.7 04/04/2022    .3 04/04/2022    RDW 12.1 04/04/2022     04/04/2022       CMP:   Lab Results   Component Value Date     07/20/2021    K 4.1 07/20/2021    CL 89 07/20/2021    CO2 40 07/20/2021    BUN 28 03/29/2022    CREATININE 1.4 03/29/2022    GFRAA 45 03/29/2022    LABGLOM 45 03/29/2022    GLUCOSE 223 07/20/2021    GLUCOSE 181 12/01/2011    PROT 6.6 07/20/2021    CALCIUM 9.4 07/20/2021    BILITOT 0.3 07/20/2021    ALKPHOS 97 07/20/2021    AST 9 07/20/2021    ALT 15 07/20/2021       POC Tests: No results for input(s): POCGLU, POCNA, POCK, POCCL, POCBUN, POCHEMO, POCHCT in the last 72 hours.     Coags:   Lab Results   Component Value Date    PROTIME 12.6 04/19/2021 PROTIME 12.6 04/22/2011    INR 1.1 04/19/2021    APTT 26.9 04/19/2021       HCG (If Applicable): No results found for: PREGTESTUR, PREGSERUM, HCG, HCGQUANT     ABGs: No results found for: PHART, PO2ART, UDR8CQO, QDQ7OLT, BEART, B1YCACVV     Type & Screen (If Applicable):  No results found for: LABABO, LABRH    Drug/Infectious Status (If Applicable):  No results found for: HIV, HEPCAB    COVID-19 Screening (If Applicable):   Lab Results   Component Value Date    COVID19 Not Detected 07/17/2021           Anesthesia Evaluation  Patient summary reviewed  Airway: Mallampati: III  TM distance: >3 FB   Neck ROM: full  Mouth opening: > = 3 FB Dental:      Comment: Grossly intact    Pulmonary: breath sounds clear to auscultation  (+) pneumonia: resolved,  COPD: moderate and severe,  sleep apnea: on CPAP,  asthma: current smoker          Patient did not smoke on day of surgery. Cardiovascular:    (+) hypertension: moderate, past MI: > 6 months, CAD:, CHF: systolic,       ECG reviewed  Rhythm: regular  Rate: normal  Echocardiogram reviewed         Beta Blocker:  Dose within 24 Hrs         Neuro/Psych:   (+) neuromuscular disease:, psychiatric history:            GI/Hepatic/Renal:   (+) hiatal hernia, GERD:, liver disease:, morbid obesity          Endo/Other:    (+) DiabetesType II DM, , .                 Abdominal:   (+) obese,           Vascular: Other Findings:             Anesthesia Plan      general     ASA 4       Induction: intravenous. BIS  MIPS: Prophylactic antiemetics administered. Anesthetic plan and risks discussed with patient and child/children. Plan discussed with CRNA.                   Anupama Gutierrez MD   4/4/2022

## 2022-04-04 NOTE — H&P
Devora Cheung was seen and re-examined preoperatively today, April 4, 2022. There was no substantial change in her physical and medical status. Patient is fit for the proposed surgical procedure. All questions were appropriately addressed and had no further questions regarding the risks, benefits, and alternatives of the procedure. Devora Cheung and family wished to proceed.     Willy Brower DO  Resident Physician  [facility])  Otolaryngology Residency  4/4/2022  6:58 AM

## 2022-04-04 NOTE — OP NOTE
Operative Note      Patient: Fransisco Lesch  YOB: 1955  MRN: [id number]    Date of Procedure: 4/4/2022    Pre-Op Diagnosis: RIGHT TRUE VOCAL CORD LESION    Post-Op Diagnosis: Same       Procedure(s):  DIRECT LARYNGOSCOPY WITH BIOPSY    Surgeon(s):  Yuko Paris DO    Assistant:   Resident: Stuart Neely DO    Anesthesia: General    Estimated Blood Loss (mL): Minimal    Complications: None    Specimens:   ID Type Source Tests Collected by Time Destination   A : RIGHT VOCAL CORD Tissue Tissue SURGICAL PATHOLOGY Gustavo Diaz DO 4/4/2022 0719        Implants:  * No implants in log *      Drains: * No LDAs found *    Findings: Right true vocal cord movement, cystic appearing    Detailed Description of Procedure: Indications: Patient is a 65-year-old female who presented to our clinic with increasing hoarseness over the last several months. She was found to the right true vocal cord lesion. Discussion was had regarding the need for direct laryngoscopy with biopsy to rule out malignancy. Risks benefits and alternatives were discussed with the patient and the patient was agreeable to proceed with the procedure. Procedure: The patient was seen the morning of surgery and consented preoperatively. She was taken back to the operating room placed supine on the operating table and handed over to anesthesia for induction. Induction and Intubation occurred without incident. The patient was then prepped and draped in sterile fashion. Using a Alin anterior commissure scope direct laryngoscopy was performed and the lesion was visualized on the right true vocal cord. No other lesions were seen in the hypopharynx or supraglottic regions. Using the Jako martinez the laryngoscope was placed in suspension. Using micro graspers and scissors the cyst was excised and sent for final pathology. Hemostasis was achieved using adrenaline soaked pledgets.   The patient was then handed back over to anesthesia for reawakening which occurred without event.     Electronically signed by Mayco Severino DO on 4/4/2022 at 8:57 AM      Electronically signed by Mayco Severino DO on 4/4/2022 at 8:52 AM

## 2022-04-04 NOTE — ANESTHESIA POSTPROCEDURE EVALUATION
Department of Anesthesiology  Postprocedure Note    Patient: Anna Gooden  MRN: [id number]  YOB: 1955  Date of evaluation: 4/4/2022  Time:  9:22 AM     Procedure Summary     Date: 04/04/22 Room / Location: [facility] 01 / [facility]    Anesthesia Start: 3178 Anesthesia Stop: 5472    Procedure: DIRECT LARYNGOSCOPY WITH BIOPSY (N/A Throat) Diagnosis: (RIGHT TRUE VOCAL CORD LESION)    Surgeons: Hema Reagan DO Responsible Provider: Anuj Cobb MD    Anesthesia Type: general ASA Status: 4          Anesthesia Type: general    Naomi Phase I: Naomi Score: 10    Naomi Phase II:      Last vitals: Reviewed and per EMR flowsheets.        Anesthesia Post Evaluation    Patient location during evaluation: PACU  Patient participation: complete - patient participated  Level of consciousness: awake  Pain score: 1  Airway patency: patent  Nausea & Vomiting: no nausea and no vomiting  Complications: no  Cardiovascular status: hemodynamically stable  Respiratory status: acceptable  Hydration status: euvolemic

## 2022-04-06 LAB
EKG ATRIAL RATE: 68 BPM
EKG P AXIS: 52 DEGREES
EKG P-R INTERVAL: 200 MS
EKG Q-T INTERVAL: 500 MS
EKG QRS DURATION: 146 MS
EKG QTC CALCULATION (BAZETT): 531 MS
EKG R AXIS: 42 DEGREES
EKG T AXIS: 27 DEGREES
EKG VENTRICULAR RATE: 68 BPM

## 2022-04-07 ENCOUNTER — TELEPHONE (OUTPATIENT)
Dept: ENT CLINIC | Age: 67
End: 2022-04-07

## 2022-04-07 NOTE — TELEPHONE ENCOUNTER
Pt called in to [facility] a post op appt. She also stated her throat is sore. Alpedro Parker can be reached at [phone]. Thank you.

## 2022-04-11 ENCOUNTER — APPOINTMENT (OUTPATIENT)
Dept: GENERAL RADIOLOGY | Age: 67
DRG: 291 | End: 2022-04-11
Payer: MEDICARE

## 2022-04-11 ENCOUNTER — HOSPITAL ENCOUNTER (INPATIENT)
Age: 67
LOS: 8 days | Discharge: HOME | DRG: 291 | End: 2022-04-19
Attending: EMERGENCY MEDICINE | Admitting: INTERNAL MEDICINE
Payer: MEDICARE

## 2022-04-11 DIAGNOSIS — I25.5 ISCHEMIC CARDIOMYOPATHY: Primary | ICD-10-CM

## 2022-04-11 DIAGNOSIS — R06.03 RESPIRATORY DISTRESS: ICD-10-CM

## 2022-04-11 PROBLEM — I50.43 CHF (CONGESTIVE HEART FAILURE), NYHA CLASS I, ACUTE ON CHRONIC, COMBINED (HCC): Status: ACTIVE | Noted: 2022-04-11

## 2022-04-11 LAB
ALBUMIN SERPL-MCNC: 3.3 G/DL (ref 3.5–5.2)
ALP BLD-CCNC: 71 U/L (ref 35–104)
ALT SERPL-CCNC: 15 U/L (ref 0–32)
ANION GAP SERPL CALCULATED.3IONS-SCNC: 12 MMOL/L (ref 7–16)
AST SERPL-CCNC: 18 U/L (ref 0–31)
BASOPHILS ABSOLUTE: 0.04 E9/L (ref 0–0.2)
BASOPHILS RELATIVE PERCENT: 0.4 % (ref 0–2)
BILIRUB SERPL-MCNC: 0.5 MG/DL (ref 0–1.2)
BUN BLDV-MCNC: 22 MG/DL (ref 6–23)
CALCIUM SERPL-MCNC: 8.2 MG/DL (ref 8.6–10.2)
CHLORIDE BLD-SCNC: 100 MMOL/L (ref 98–107)
CHP ED QC CHECK: YES
CO2: 21 MMOL/L (ref 22–29)
CREAT SERPL-MCNC: 1.2 MG/DL (ref 0.5–1)
EKG ATRIAL RATE: 94 BPM
EKG P AXIS: 73 DEGREES
EKG P-R INTERVAL: 164 MS
EKG Q-T INTERVAL: 418 MS
EKG QRS DURATION: 138 MS
EKG QTC CALCULATION (BAZETT): 522 MS
EKG R AXIS: 89 DEGREES
EKG T AXIS: 86 DEGREES
EKG VENTRICULAR RATE: 94 BPM
EOSINOPHILS ABSOLUTE: 0.04 E9/L (ref 0.05–0.5)
EOSINOPHILS RELATIVE PERCENT: 0.4 % (ref 0–6)
GFR AFRICAN AMERICAN: 54
GFR NON-AFRICAN AMERICAN: 54 ML/MIN/1.73
GLUCOSE BLD-MCNC: 274 MG/DL (ref 74–99)
GLUCOSE BLD-MCNC: 342 MG/DL
HCT VFR BLD CALC: 46.1 % (ref 34–48)
HEMOGLOBIN: 14.7 G/DL (ref 11.5–15.5)
IMMATURE GRANULOCYTES #: 0.05 E9/L
IMMATURE GRANULOCYTES %: 0.5 % (ref 0–5)
LYMPHOCYTES ABSOLUTE: 0.66 E9/L (ref 1.5–4)
LYMPHOCYTES RELATIVE PERCENT: 6.5 % (ref 20–42)
MCH RBC QN AUTO: 31.7 PG (ref 26–35)
MCHC RBC AUTO-ENTMCNC: 31.9 % (ref 32–34.5)
MCV RBC AUTO: 99.6 FL (ref 80–99.9)
METER GLUCOSE: 287 MG/DL (ref 74–99)
METER GLUCOSE: 342 MG/DL (ref 74–99)
MONOCYTES ABSOLUTE: 0.58 E9/L (ref 0.1–0.95)
MONOCYTES RELATIVE PERCENT: 5.7 % (ref 2–12)
NEUTROPHILS ABSOLUTE: 8.75 E9/L (ref 1.8–7.3)
NEUTROPHILS RELATIVE PERCENT: 86.5 % (ref 43–80)
PDW BLD-RTO: 12 FL (ref 11.5–15)
PLATELET # BLD: 250 E9/L (ref 130–450)
PMV BLD AUTO: 10.4 FL (ref 7–12)
POTASSIUM REFLEX MAGNESIUM: 4.9 MMOL/L (ref 3.5–5)
PRO-BNP: 2108 PG/ML (ref 0–125)
RBC # BLD: 4.63 E12/L (ref 3.5–5.5)
REASON FOR REJECTION: NORMAL
REJECTED TEST: NORMAL
SODIUM BLD-SCNC: 133 MMOL/L (ref 132–146)
TOTAL PROTEIN: 6.3 G/DL (ref 6.4–8.3)
TROPONIN, HIGH SENSITIVITY: 10 NG/L (ref 0–9)
TROPONIN, HIGH SENSITIVITY: 12 NG/L (ref 0–9)
WBC # BLD: 10.1 E9/L (ref 4.5–11.5)

## 2022-04-11 PROCEDURE — 80053 COMPREHEN METABOLIC PANEL: CPT

## 2022-04-11 PROCEDURE — 6360000002 HC RX W HCPCS: Performed by: PHYSICIAN ASSISTANT

## 2022-04-11 PROCEDURE — 94640 AIRWAY INHALATION TREATMENT: CPT

## 2022-04-11 PROCEDURE — 84484 ASSAY OF TROPONIN QUANT: CPT

## 2022-04-11 PROCEDURE — 85025 COMPLETE CBC W/AUTO DIFF WBC: CPT

## 2022-04-11 PROCEDURE — 96365 THER/PROPH/DIAG IV INF INIT: CPT

## 2022-04-11 PROCEDURE — 6370000000 HC RX 637 (ALT 250 FOR IP): Performed by: PHYSICIAN ASSISTANT

## 2022-04-11 PROCEDURE — 94660 CPAP INITIATION&MGMT: CPT

## 2022-04-11 PROCEDURE — 93005 ELECTROCARDIOGRAM TRACING: CPT | Performed by: EMERGENCY MEDICINE

## 2022-04-11 PROCEDURE — 2140000000 HC CCU INTERMEDIATE R&B

## 2022-04-11 PROCEDURE — 82962 GLUCOSE BLOOD TEST: CPT

## 2022-04-11 PROCEDURE — 36415 COLL VENOUS BLD VENIPUNCTURE: CPT

## 2022-04-11 PROCEDURE — 6370000000 HC RX 637 (ALT 250 FOR IP): Performed by: EMERGENCY MEDICINE

## 2022-04-11 PROCEDURE — 99285 EMERGENCY DEPT VISIT HI MDM: CPT

## 2022-04-11 PROCEDURE — 83880 ASSAY OF NATRIURETIC PEPTIDE: CPT

## 2022-04-11 PROCEDURE — 2500000003 HC RX 250 WO HCPCS: Performed by: EMERGENCY MEDICINE

## 2022-04-11 PROCEDURE — 71045 X-RAY EXAM CHEST 1 VIEW: CPT

## 2022-04-11 PROCEDURE — 96374 THER/PROPH/DIAG INJ IV PUSH: CPT

## 2022-04-11 PROCEDURE — 6360000002 HC RX W HCPCS: Performed by: EMERGENCY MEDICINE

## 2022-04-11 RX ORDER — ATORVASTATIN CALCIUM 10 MG/1
10 TABLET, FILM COATED ORAL NIGHTLY
Status: DISCONTINUED | OUTPATIENT
Start: 2022-04-11 | End: 2022-04-19 | Stop reason: HOSPADM

## 2022-04-11 RX ORDER — POTASSIUM CHLORIDE 20 MEQ/1
40 TABLET, EXTENDED RELEASE ORAL PRN
Status: DISCONTINUED | OUTPATIENT
Start: 2022-04-11 | End: 2022-04-19 | Stop reason: HOSPADM

## 2022-04-11 RX ORDER — MAGNESIUM SULFATE IN WATER 40 MG/ML
2000 INJECTION, SOLUTION INTRAVENOUS ONCE
Status: COMPLETED | OUTPATIENT
Start: 2022-04-11 | End: 2022-04-11

## 2022-04-11 RX ORDER — POLYETHYLENE GLYCOL 3350 17 G/17G
17 POWDER, FOR SOLUTION ORAL DAILY PRN
Status: DISCONTINUED | OUTPATIENT
Start: 2022-04-11 | End: 2022-04-19 | Stop reason: HOSPADM

## 2022-04-11 RX ORDER — CETIRIZINE HYDROCHLORIDE 10 MG/1
10 TABLET ORAL DAILY
Status: DISCONTINUED | OUTPATIENT
Start: 2022-04-11 | End: 2022-04-19 | Stop reason: HOSPADM

## 2022-04-11 RX ORDER — FAMOTIDINE 40 MG/1
40 TABLET, FILM COATED ORAL
COMMUNITY
End: 2022-09-22

## 2022-04-11 RX ORDER — DULOXETIN HYDROCHLORIDE 60 MG/1
60 CAPSULE, DELAYED RELEASE ORAL 2 TIMES DAILY
Status: DISCONTINUED | OUTPATIENT
Start: 2022-04-11 | End: 2022-04-19 | Stop reason: HOSPADM

## 2022-04-11 RX ORDER — SODIUM CHLORIDE 0.9 % (FLUSH) 0.9 %
5-40 SYRINGE (ML) INJECTION PRN
Status: DISCONTINUED | OUTPATIENT
Start: 2022-04-11 | End: 2022-04-19 | Stop reason: HOSPADM

## 2022-04-11 RX ORDER — DULOXETIN HYDROCHLORIDE 60 MG/1
60 CAPSULE, DELAYED RELEASE ORAL 2 TIMES DAILY
COMMUNITY

## 2022-04-11 RX ORDER — MONTELUKAST SODIUM 10 MG/1
10 TABLET ORAL NIGHTLY
COMMUNITY

## 2022-04-11 RX ORDER — ISOSORBIDE MONONITRATE 60 MG/1
60 TABLET, EXTENDED RELEASE ORAL DAILY
Status: DISCONTINUED | OUTPATIENT
Start: 2022-04-11 | End: 2022-04-18

## 2022-04-11 RX ORDER — AZELASTINE 1 MG/ML
1 SPRAY, METERED NASAL 2 TIMES DAILY PRN
Status: DISCONTINUED | OUTPATIENT
Start: 2022-04-11 | End: 2022-04-11 | Stop reason: CLARIF

## 2022-04-11 RX ORDER — FLUTICASONE PROPIONATE 50 MCG
2 SPRAY, SUSPENSION (ML) NASAL DAILY
Status: DISCONTINUED | OUTPATIENT
Start: 2022-04-11 | End: 2022-04-19 | Stop reason: HOSPADM

## 2022-04-11 RX ORDER — LABETALOL HYDROCHLORIDE 5 MG/ML
10 INJECTION, SOLUTION INTRAVENOUS ONCE
Status: DISCONTINUED | OUTPATIENT
Start: 2022-04-11 | End: 2022-04-18

## 2022-04-11 RX ORDER — IPRATROPIUM BROMIDE AND ALBUTEROL SULFATE 2.5; .5 MG/3ML; MG/3ML
3 SOLUTION RESPIRATORY (INHALATION) ONCE
Status: COMPLETED | OUTPATIENT
Start: 2022-04-11 | End: 2022-04-11

## 2022-04-11 RX ORDER — IPRATROPIUM BROMIDE AND ALBUTEROL SULFATE 2.5; .5 MG/3ML; MG/3ML
1 SOLUTION RESPIRATORY (INHALATION)
Status: DISCONTINUED | OUTPATIENT
Start: 2022-04-11 | End: 2022-04-19 | Stop reason: HOSPADM

## 2022-04-11 RX ORDER — NICOTINE POLACRILEX 4 MG
15 LOZENGE BUCCAL PRN
Status: DISCONTINUED | OUTPATIENT
Start: 2022-04-11 | End: 2022-04-19 | Stop reason: HOSPADM

## 2022-04-11 RX ORDER — IPRATROPIUM BROMIDE AND ALBUTEROL SULFATE 2.5; .5 MG/3ML; MG/3ML
1 SOLUTION RESPIRATORY (INHALATION) EVERY 4 HOURS PRN
COMMUNITY
End: 2022-04-26

## 2022-04-11 RX ORDER — GABAPENTIN 100 MG/1
100 CAPSULE ORAL 2 TIMES DAILY
COMMUNITY

## 2022-04-11 RX ORDER — ARFORMOTEROL TARTRATE 15 UG/2ML
15 SOLUTION RESPIRATORY (INHALATION) 2 TIMES DAILY PRN
Status: DISCONTINUED | OUTPATIENT
Start: 2022-04-11 | End: 2022-04-19 | Stop reason: HOSPADM

## 2022-04-11 RX ORDER — ACETAMINOPHEN 325 MG/1
650 TABLET ORAL EVERY 6 HOURS PRN
Status: DISCONTINUED | OUTPATIENT
Start: 2022-04-11 | End: 2022-04-19 | Stop reason: HOSPADM

## 2022-04-11 RX ORDER — HYDRALAZINE HYDROCHLORIDE 25 MG/1
50 TABLET, FILM COATED ORAL 3 TIMES DAILY
Status: DISCONTINUED | OUTPATIENT
Start: 2022-04-11 | End: 2022-04-19 | Stop reason: HOSPADM

## 2022-04-11 RX ORDER — ARFORMOTEROL TARTRATE 15 UG/2ML
1 SOLUTION RESPIRATORY (INHALATION) 2 TIMES DAILY PRN
COMMUNITY
End: 2022-08-23

## 2022-04-11 RX ORDER — HYDRALAZINE HYDROCHLORIDE 20 MG/ML
10 INJECTION INTRAMUSCULAR; INTRAVENOUS EVERY 4 HOURS PRN
Status: DISCONTINUED | OUTPATIENT
Start: 2022-04-11 | End: 2022-04-19 | Stop reason: HOSPADM

## 2022-04-11 RX ORDER — DEXTROSE MONOHYDRATE 25 G/50ML
12.5 INJECTION, SOLUTION INTRAVENOUS PRN
Status: DISCONTINUED | OUTPATIENT
Start: 2022-04-11 | End: 2022-04-19 | Stop reason: HOSPADM

## 2022-04-11 RX ORDER — SIMVASTATIN 20 MG
20 TABLET ORAL NIGHTLY
COMMUNITY
End: 2022-07-06 | Stop reason: SDUPTHER

## 2022-04-11 RX ORDER — BUMETANIDE 0.25 MG/ML
1 INJECTION, SOLUTION INTRAMUSCULAR; INTRAVENOUS 2 TIMES DAILY
Status: DISCONTINUED | OUTPATIENT
Start: 2022-04-11 | End: 2022-04-16

## 2022-04-11 RX ORDER — BUMETANIDE 0.25 MG/ML
0.5 INJECTION, SOLUTION INTRAMUSCULAR; INTRAVENOUS ONCE
Status: COMPLETED | OUTPATIENT
Start: 2022-04-11 | End: 2022-04-11

## 2022-04-11 RX ORDER — ACETAMINOPHEN 325 MG/1
1000 TABLET ORAL EVERY 6 HOURS PRN
COMMUNITY

## 2022-04-11 RX ORDER — GABAPENTIN 100 MG/1
100 CAPSULE ORAL 2 TIMES DAILY
Status: DISCONTINUED | OUTPATIENT
Start: 2022-04-11 | End: 2022-04-19 | Stop reason: HOSPADM

## 2022-04-11 RX ORDER — SODIUM CHLORIDE 0.9 % (FLUSH) 0.9 %
5-40 SYRINGE (ML) INJECTION EVERY 12 HOURS SCHEDULED
Status: DISCONTINUED | OUTPATIENT
Start: 2022-04-11 | End: 2022-04-19 | Stop reason: HOSPADM

## 2022-04-11 RX ORDER — FLUTICASONE PROPIONATE 50 MCG
2 SPRAY, SUSPENSION (ML) NASAL DAILY
COMMUNITY
End: 2022-08-23

## 2022-04-11 RX ORDER — IPRATROPIUM BROMIDE AND ALBUTEROL SULFATE 2.5; .5 MG/3ML; MG/3ML
1 SOLUTION RESPIRATORY (INHALATION)
Status: COMPLETED | OUTPATIENT
Start: 2022-04-11 | End: 2022-04-11

## 2022-04-11 RX ORDER — ISOSORBIDE MONONITRATE 60 MG/1
60 TABLET, EXTENDED RELEASE ORAL DAILY
Status: ON HOLD | COMMUNITY
End: 2022-04-19 | Stop reason: HOSPADM

## 2022-04-11 RX ORDER — CETIRIZINE HYDROCHLORIDE 10 MG/1
10 TABLET ORAL DAILY
COMMUNITY
End: 2022-05-25

## 2022-04-11 RX ORDER — INSULIN LISPRO 100 [IU]/ML
0-6 INJECTION, SOLUTION INTRAVENOUS; SUBCUTANEOUS
COMMUNITY

## 2022-04-11 RX ORDER — SPIRONOLACTONE 25 MG/1
25 TABLET ORAL DAILY
Status: DISCONTINUED | OUTPATIENT
Start: 2022-04-11 | End: 2022-04-19 | Stop reason: HOSPADM

## 2022-04-11 RX ORDER — POTASSIUM CHLORIDE 7.45 MG/ML
10 INJECTION INTRAVENOUS PRN
Status: DISCONTINUED | OUTPATIENT
Start: 2022-04-11 | End: 2022-04-19 | Stop reason: HOSPADM

## 2022-04-11 RX ORDER — ASPIRIN 81 MG/1
81 TABLET, CHEWABLE ORAL DAILY
Status: DISCONTINUED | OUTPATIENT
Start: 2022-04-11 | End: 2022-04-19 | Stop reason: HOSPADM

## 2022-04-11 RX ORDER — MONTELUKAST SODIUM 10 MG/1
10 TABLET ORAL NIGHTLY
Status: DISCONTINUED | OUTPATIENT
Start: 2022-04-11 | End: 2022-04-19 | Stop reason: HOSPADM

## 2022-04-11 RX ORDER — PANTOPRAZOLE SODIUM 40 MG/1
40 TABLET, DELAYED RELEASE ORAL DAILY
Status: DISCONTINUED | OUTPATIENT
Start: 2022-04-11 | End: 2022-04-19 | Stop reason: HOSPADM

## 2022-04-11 RX ORDER — TIOTROPIUM BROMIDE INHALATION SPRAY 1.56 UG/1
2 SPRAY, METERED RESPIRATORY (INHALATION) DAILY
COMMUNITY
End: 2022-08-23

## 2022-04-11 RX ORDER — ACETAMINOPHEN 650 MG/1
650 SUPPOSITORY RECTAL EVERY 6 HOURS PRN
Status: DISCONTINUED | OUTPATIENT
Start: 2022-04-11 | End: 2022-04-19 | Stop reason: HOSPADM

## 2022-04-11 RX ORDER — INSULIN DEGLUDEC 200 U/ML
50 INJECTION, SOLUTION SUBCUTANEOUS NIGHTLY
COMMUNITY

## 2022-04-11 RX ORDER — MAGNESIUM SULFATE IN WATER 40 MG/ML
2000 INJECTION, SOLUTION INTRAVENOUS PRN
Status: DISCONTINUED | OUTPATIENT
Start: 2022-04-11 | End: 2022-04-19 | Stop reason: HOSPADM

## 2022-04-11 RX ORDER — ASPIRIN 81 MG/1
81 TABLET, CHEWABLE ORAL DAILY
COMMUNITY
End: 2022-07-06 | Stop reason: SDUPTHER

## 2022-04-11 RX ORDER — TRAZODONE HYDROCHLORIDE 50 MG/1
100 TABLET ORAL NIGHTLY
Status: DISCONTINUED | OUTPATIENT
Start: 2022-04-11 | End: 2022-04-19 | Stop reason: HOSPADM

## 2022-04-11 RX ORDER — AZELASTINE 1 MG/ML
1 SPRAY, METERED NASAL 2 TIMES DAILY PRN
COMMUNITY
End: 2022-08-23

## 2022-04-11 RX ORDER — METHYLPREDNISOLONE SODIUM SUCCINATE 125 MG/2ML
125 INJECTION, POWDER, LYOPHILIZED, FOR SOLUTION INTRAMUSCULAR; INTRAVENOUS ONCE
Status: COMPLETED | OUTPATIENT
Start: 2022-04-11 | End: 2022-04-11

## 2022-04-11 RX ORDER — CARVEDILOL 25 MG/1
25 TABLET ORAL 2 TIMES DAILY WITH MEALS
Status: DISCONTINUED | OUTPATIENT
Start: 2022-04-11 | End: 2022-04-19 | Stop reason: HOSPADM

## 2022-04-11 RX ORDER — SODIUM CHLORIDE 9 MG/ML
INJECTION, SOLUTION INTRAVENOUS PRN
Status: DISCONTINUED | OUTPATIENT
Start: 2022-04-11 | End: 2022-04-19 | Stop reason: HOSPADM

## 2022-04-11 RX ORDER — DEXTROSE MONOHYDRATE 50 MG/ML
100 INJECTION, SOLUTION INTRAVENOUS PRN
Status: DISCONTINUED | OUTPATIENT
Start: 2022-04-11 | End: 2022-04-19 | Stop reason: HOSPADM

## 2022-04-11 RX ORDER — TRAZODONE HYDROCHLORIDE 100 MG/1
100 TABLET ORAL NIGHTLY
COMMUNITY

## 2022-04-11 RX ORDER — FAMOTIDINE 20 MG/1
20 TABLET, FILM COATED ORAL
Status: DISCONTINUED | OUTPATIENT
Start: 2022-04-11 | End: 2022-04-19 | Stop reason: HOSPADM

## 2022-04-11 RX ORDER — SACUBITRIL AND VALSARTAN 49; 51 MG/1; MG/1
1 TABLET, FILM COATED ORAL 2 TIMES DAILY
Status: ON HOLD | COMMUNITY
End: 2022-04-19 | Stop reason: HOSPADM

## 2022-04-11 RX ADMIN — BUMETANIDE 0.5 MG: 0.25 INJECTION, SOLUTION INTRAMUSCULAR; INTRAVENOUS at 14:44

## 2022-04-11 RX ADMIN — PANTOPRAZOLE SODIUM 40 MG: 40 TABLET, DELAYED RELEASE ORAL at 19:03

## 2022-04-11 RX ADMIN — ARFORMOTEROL TARTRATE 15 MCG: 15 SOLUTION RESPIRATORY (INHALATION) at 20:18

## 2022-04-11 RX ADMIN — IPRATROPIUM BROMIDE AND ALBUTEROL SULFATE 3 AMPULE: .5; 2.5 SOLUTION RESPIRATORY (INHALATION) at 12:48

## 2022-04-11 RX ADMIN — IPRATROPIUM BROMIDE AND ALBUTEROL SULFATE 1 AMPULE: 2.5; .5 SOLUTION RESPIRATORY (INHALATION) at 10:14

## 2022-04-11 RX ADMIN — ASPIRIN 81 MG 81 MG: 81 TABLET ORAL at 19:04

## 2022-04-11 RX ADMIN — ISOSORBIDE MONONITRATE 60 MG: 60 TABLET, EXTENDED RELEASE ORAL at 19:04

## 2022-04-11 RX ADMIN — IPRATROPIUM BROMIDE AND ALBUTEROL SULFATE 1 AMPULE: 2.5; .5 SOLUTION RESPIRATORY (INHALATION) at 10:13

## 2022-04-11 RX ADMIN — METHYLPREDNISOLONE SODIUM SUCCINATE 125 MG: 125 INJECTION, POWDER, FOR SOLUTION INTRAMUSCULAR; INTRAVENOUS at 10:45

## 2022-04-11 RX ADMIN — IPRATROPIUM BROMIDE AND ALBUTEROL SULFATE 3 ML: .5; 2.5 SOLUTION RESPIRATORY (INHALATION) at 20:18

## 2022-04-11 RX ADMIN — IPRATROPIUM BROMIDE AND ALBUTEROL SULFATE 1 AMPULE: 2.5; .5 SOLUTION RESPIRATORY (INHALATION) at 10:08

## 2022-04-11 RX ADMIN — CARVEDILOL 25 MG: 25 TABLET, FILM COATED ORAL at 19:03

## 2022-04-11 RX ADMIN — FAMOTIDINE 20 MG: 20 TABLET ORAL at 19:03

## 2022-04-11 RX ADMIN — MAGNESIUM SULFATE HEPTAHYDRATE 2000 MG: 40 INJECTION, SOLUTION INTRAVENOUS at 12:33

## 2022-04-11 RX ADMIN — INSULIN LISPRO 8 UNITS: 100 INJECTION, SOLUTION INTRAVENOUS; SUBCUTANEOUS at 19:07

## 2022-04-11 RX ADMIN — ACETAMINOPHEN 650 MG: 325 TABLET ORAL at 19:04

## 2022-04-11 ASSESSMENT — PAIN DESCRIPTION - ORIENTATION: ORIENTATION: RIGHT

## 2022-04-11 ASSESSMENT — PAIN DESCRIPTION - PAIN TYPE: TYPE: ACUTE PAIN

## 2022-04-11 ASSESSMENT — PAIN DESCRIPTION - LOCATION: LOCATION: GENERALIZED

## 2022-04-11 ASSESSMENT — PAIN SCALES - GENERAL
PAINLEVEL_OUTOF10: 9
PAINLEVEL_OUTOF10: 8

## 2022-04-11 NOTE — Clinical Note
Discharge Plan[de-identified] Other/[facility])   Telemetry/Cardiac Monitoring Required?: Yes

## 2022-04-11 NOTE — PROGRESS NOTES
Date: 4/11/2022    Time: 10:24 AM    Patient Placed On BIPAP/CPAP/ Non-Invasive Ventilation? Yes    If no must comment. Facial area red/color change? No           If YES are Blister/Lesion present? No   If yes must notify nursing staff  BIPAP/CPAP skin barrier?   Yes    Skin barrier type:mepilexlite      04/11/22 1015   NIV Type   $NIV $Daily Charge   Skin Protection for O2 Device Yes   Location Nose   NIV Started/Stopped On   Mode Biphasic   Mask Type Full face mask  (non vented)   Mask Size Medium   Settings/Measurements   IPAP 18 cmH20   CPAP/EPAP 8 cmH2O   Rate Ordered 14   Insp Rise Time (%) 2 %   FiO2  80 %   Vt Exhaled 309 mL   Minute Volume 6.7 Liters   Mask Leak (lpm) 42 lpm   Comfort Level Good   Using Accessory Muscles Yes   SpO2 98          Comments:        Sonia Chavez RCP

## 2022-04-11 NOTE — ED NOTES
Pt incontinent of urine, linens changed. Warm blankets provided.      Fiorella Otto, RN  04/11/22 7476

## 2022-04-11 NOTE — ED PROVIDER NOTES
Department of Emergency Medicine   ED  Provider Note  Admit Date/RoomTime: 4/11/2022  9:54 AM  ED Room: 23/23          History of Present Illness:  4/11/22, Time: 1:34 PM EDT  Chief Complaint   Patient presents with    Respiratory Distress     increased SOB the last 4 days. was 90% on trilogy oxygen concentrator at home. EMS placed on CPAP, arrived at 95%, hx of COPD                 Devora Cheung is a 78 y.o. female presenting to the ED for respiratory distress. Patient was short of breath the past 4 days. Came on gradually. Nothing makes it better or worse, no associated pain. She was 90% on her trilogy. EMS reports diffuse wheezing all lung fields. Patient denies any fever, chills, chest, back pain, neck pain or stiffness, paresthesias, lethargy, or any other symptoms or complaints.     Review of Systems:   Pertinent positives and negatives are stated within HPI, all other systems reviewed and are negative.        --------------------------------------------- PAST HISTORY ---------------------------------------------  Past Medical History:  has a past medical history of Asthma, Atherosclerosis of native artery of left leg with rest pain ([address] CAD (coronary artery disease), Cellulitis and abscess of trunk, Cerebellar infarct (HCC), Chronic back pain, Chronic kidney disease, Chronic systolic congestive heart failure ([address] Chronic venous insufficiency, COPD (chronic obstructive pulmonary disease) ([address] COVID-19, Depression, Diabetes mellitus ([address] Diabetic neuropathy ([address] Diverticulosis, Fatty liver, Glaucoma, open angle, Hepatic encephalopathy ([address] Hiatal hernia, History of blood transfusion, Hyperlipidemia, Hyperplastic colon polyp, Hypertension, Incontinence, Liver mass, Morbid obesity ([address] Movement disorder, Myocardial infarction ([address] O2 dependent, Osteoarthritis, generalized, Pain in both lower legs, Peripheral vascular disease ([address] Pneumonia, Pulmonary edema, PVD (peripheral vascular disease) with claudication ([facility] [address] [facility] Sleep apnea, Status post peripheral artery angioplasty, Tobacco abuse, Tobacco abuse, Tubular adenoma polyp of rectum, Urinary tract infection due to ESBL Klebsiella, and Ventral hernia. Past Surgical History:  has a past surgical history that includes knee surgery; Upper gastrointestinal endoscopy (12/20/12); Coronary artery bypass graft; layer wound closure (Left, [id number]); Echo Complete (10/9/2013); polysomnography (1/2016); liver biopsy (1/8/2016); bronchial brush biopsy (1/25/2013); Breast surgery (2000); Cholecystectomy (YRS AGO); Cardiac surgery (12/2011); Cardiac catheterization (04/20/2015); Hysterectomy; joint replacement (2011); Upper gastrointestinal endoscopy (12/23/2016); Colonoscopy (11/15/2013); Colonoscopy (12/23/2016); Upper gastrointestinal endoscopy (02/08/2017); Endoscopy, colon, diagnostic (04/18/2017); Gallbladder surgery; angioplasty (11/13/2018); and laryngoscopy (N/A, 4/4/2022). Social History:  reports that she has been smoking cigarettes. She started smoking about 47 years ago. She has a 10.00 pack-year smoking history. She has never used smokeless tobacco. She reports current alcohol use. She reports previous drug use. Drug: Marijuana Sami Parmargarita). Family History: family history includes Asthma in her father; Cancer in her mother. . Unless otherwise noted, family history is non contributory    The patients home medications have been reviewed.     Allergies: Latex, Bee venom, Dilaudid [hydromorphone hcl], Dye [iodides], Percocet [oxycodone-acetaminophen], Keflex [cephalexin], Lasix [furosemide], Levaquin [levofloxacin in d5w], Lipitor, Lyrica [pregabalin], Morphine, Naproxen, Nefazodone, Norvasc [amlodipine], Oxycodone-acetaminophen, Shellfish-derived products, and Trazodone and nefazodone        ---------------------------------------------------PHYSICAL EXAM--------------------------------------    Constitutional/General: Alert and oriented x3  Head: Normocephalic and atraumatic  Eyes: PERRL, EOMI, sclera non icteric  Mouth: Oropharynx clear, handling secretions, no trismus, no asymmetry of the posterior oropharynx or uvular edema  Neck: Supple, full ROM, no stridor, no meningeal signs  Respiratory: Diffuse wheezing all lung fields, moderate respiratory distress  Cardiovascular:  Regular tachycardia. Regular rhythm. 2+ distal pulses. Equal extremity pulses. Chest: No chest wall tenderness  GI:  Abdomen Soft, Non tender, Non distended. No rebound, guarding, or rigidity. No pulsatile masses. Musculoskeletal: Moves all extremities x 4. Warm and well perfused, no clubbing, cyanosis, or edema. Capillary refill <3 seconds  Integument: skin warm and dry. No rashes. Neurologic: GCS 15, no focal deficits, symmetric strength 5/5 in the upper and lower extremities bilaterally  Psychiatric: Normal Affect          -------------------------------------------------- RESULTS -------------------------------------------------  I have personally reviewed all laboratory and imaging results for this patient. Results are listed below.      LABS: (Lab results interpreted by me)  Results for orders placed or performed during the hospital encounter of 04/11/22   CBC with Auto Differential   Result Value Ref Range    WBC 10.1 4.5 - 11.5 E9/L    RBC 4.63 3.50 - 5.50 E12/L    Hemoglobin 14.7 11.5 - 15.5 g/dL    Hematocrit 46.1 34.0 - 48.0 %    MCV 99.6 80.0 - 99.9 fL    MCH 31.7 26.0 - 35.0 pg    MCHC 31.9 (L) 32.0 - 34.5 %    RDW 12.0 11.5 - 15.0 fL    Platelets 963 924 - 778 E9/L    MPV 10.4 7.0 - 12.0 fL    Neutrophils % 86.5 (H) 43.0 - 80.0 %    Immature Granulocytes % 0.5 0.0 - 5.0 %    Lymphocytes % 6.5 (L) 20.0 - 42.0 %    Monocytes % 5.7 2.0 - 12.0 %    Eosinophils % 0.4 0.0 - 6.0 %    Basophils % 0.4 0.0 - 2.0 %    Neutrophils Absolute 8.75 (H) 1.80 - 7.30 E9/L    Immature Granulocytes # 0.05 E9/L    Lymphocytes Absolute 0.66 (L) 1.50 - 4.00 E9/L    Monocytes Absolute 0.58 0.10 - 0.95 E9/L    Eosinophils Absolute 0.04 (L) 0.05 - 0.50 E9/L    Basophils Absolute 0.04 0.00 - 0.20 E9/L   Brain Natriuretic Peptide   Result Value Ref Range    Pro-BNP 2,108 (H) 0 - 125 pg/mL   SPECIMEN REJECTION   Result Value Ref Range    Rejected Test CMP TROP     Reason for Rejection see below    Comprehensive Metabolic Panel w/ Reflex to MG   Result Value Ref Range    Sodium 133 132 - 146 mmol/L    Potassium reflex Magnesium 4.9 3.5 - 5.0 mmol/L    Chloride 100 98 - 107 mmol/L    CO2 21 (L) 22 - 29 mmol/L    Anion Gap 12 7 - 16 mmol/L    Glucose 274 (H) 74 - 99 mg/dL    BUN 22 6 - 23 mg/dL    CREATININE 1.2 (H) 0.5 - 1.0 mg/dL    GFR Non-African American 54 >=60 mL/min/1.73    GFR African American 54     Calcium 8.2 (L) 8.6 - 10.2 mg/dL    Total Protein 6.3 (L) 6.4 - 8.3 g/dL    Albumin 3.3 (L) 3.5 - 5.2 g/dL    Total Bilirubin 0.5 0.0 - 1.2 mg/dL    Alkaline Phosphatase 71 35 - 104 U/L    ALT 15 0 - 32 U/L    AST 18 0 - 31 U/L   Troponin   Result Value Ref Range    Troponin, High Sensitivity 12 (H) 0 - 9 ng/L   ,       RADIOLOGY:  Interpreted by Radiologist unless otherwise specified  XR CHEST PORTABLE   Final Result   Bilateral airspace disease likely related to cardiogenic edema. See above. EKG Interpretation  Interpreted by emergency department physician, Dr. Jodie Madison         ------------------------- NURSING NOTES AND VITALS REVIEWED ---------------------------   The nursing notes within the ED encounter and vital signs as below have been reviewed by myself  BP (!) 156/93   Pulse 77   Temp 98.3 °F (36.8 °C)   Resp 26   LMP 01/01/1990   SpO2 99%     Oxygen Saturation Interpretation: Improved after treatment    The patients available past medical records and past encounters were reviewed.         ------------------------------ ED COURSE/MEDICAL DECISION MAKING----------------------  Medications   labetalol (NORMODYNE;TRANDATE) injection 10 mg (0 mg IntraVENous Held 4/11/22 1052) bumetanide (BUMEX) injection 0.5 mg (has no administration in time range)   ipratropium-albuterol (DUONEB) nebulizer solution 1 ampule (1 ampule Inhalation Given 4/11/22 1014)   methylPREDNISolone sodium (SOLU-MEDROL) injection 125 mg (125 mg IntraVENous Given 4/11/22 1045)   magnesium sulfate 2000 mg in 50 mL IVPB premix (2,000 mg IntraVENous New Bag 4/11/22 1233)   ipratropium-albuterol (DUONEB) nebulizer solution 3 ampule (3 ampules Inhalation Given 4/11/22 1248)           The cardiac monitor revealed sinus with a heart rate in the 80s as interpreted by me. The cardiac monitor was ordered secondary to the patient's resp distress and to monitor the patient for dysrhythmia. CPT [id number]         Medical Decision Making: On arrival, patient was placed on BiPAP. She did receive DuoNeb x3. Labs and imaging reviewed. Patient did receive a diuretic. On reevaluation, she is resting comfortably. Tolerating the BiPAP. Given her respiratory distress, neuro presentation, patient will be admitted. Critical care time: 33 minutes      Counseling: The emergency provider has spoken with the patient and discussed todays results, in addition to providing specific details for the plan of care and counseling regarding the diagnosis and prognosis. Questions are answered at this time and they are agreeable with the plan.       --------------------------------- IMPRESSION AND DISPOSITION ---------------------------------    IMPRESSION  1. Respiratory distress        DISPOSITION  Disposition: Admit to telemetry  Patient condition is stable        NOTE: This report was transcribed using voice recognition software.  Every effort was made to ensure accuracy; however, inadvertent computerized transcription errors may be present        Kiesha Oconnell MD  04/12/22 7271

## 2022-04-12 ENCOUNTER — TELEPHONE (OUTPATIENT)
Dept: ENT CLINIC | Age: 67
End: 2022-04-12

## 2022-04-12 PROBLEM — N18.9 ACUTE KIDNEY INJURY SUPERIMPOSED ON CKD (HCC): Status: ACTIVE | Noted: 2022-04-12

## 2022-04-12 PROBLEM — N17.9 ACUTE KIDNEY INJURY SUPERIMPOSED ON CKD (HCC): Status: ACTIVE | Noted: 2022-04-12

## 2022-04-12 PROBLEM — I50.33 ACUTE ON CHRONIC DIASTOLIC (CONGESTIVE) HEART FAILURE (HCC): Status: ACTIVE | Noted: 2022-04-11

## 2022-04-12 PROBLEM — I16.1 HYPERTENSIVE EMERGENCY: Status: ACTIVE | Noted: 2022-04-12

## 2022-04-12 PROBLEM — N18.30 CKD (CHRONIC KIDNEY DISEASE) STAGE 3, GFR 30-59 ML/MIN (HCC): Chronic | Status: ACTIVE | Noted: 2022-04-12

## 2022-04-12 LAB
ANION GAP SERPL CALCULATED.3IONS-SCNC: 10 MMOL/L (ref 7–16)
BACTERIA: ABNORMAL /HPF
BASOPHILS ABSOLUTE: 0 E9/L (ref 0–0.2)
BASOPHILS RELATIVE PERCENT: 0.1 % (ref 0–2)
BILIRUBIN URINE: NEGATIVE
BLOOD, URINE: NEGATIVE
BUN BLDV-MCNC: 33 MG/DL (ref 6–23)
CALCIUM SERPL-MCNC: 8.5 MG/DL (ref 8.6–10.2)
CHLORIDE BLD-SCNC: 98 MMOL/L (ref 98–107)
CHLORIDE URINE RANDOM: 62 MMOL/L
CHOLESTEROL, TOTAL: 129 MG/DL (ref 0–199)
CLARITY: CLEAR
CO2: 27 MMOL/L (ref 22–29)
COLOR: YELLOW
CREAT SERPL-MCNC: 1.6 MG/DL (ref 0.5–1)
D DIMER: 405 NG/ML DDU
EOSINOPHILS ABSOLUTE: 0 E9/L (ref 0.05–0.5)
EOSINOPHILS RELATIVE PERCENT: 0 % (ref 0–6)
GFR AFRICAN AMERICAN: 39
GFR NON-AFRICAN AMERICAN: 39 ML/MIN/1.73
GLUCOSE BLD-MCNC: 199 MG/DL (ref 74–99)
GLUCOSE URINE: NEGATIVE MG/DL
HCT VFR BLD CALC: 38.4 % (ref 34–48)
HDLC SERPL-MCNC: 32 MG/DL
HEMOGLOBIN: 12 G/DL (ref 11.5–15.5)
KETONES, URINE: NEGATIVE MG/DL
LDL CHOLESTEROL CALCULATED: 74 MG/DL (ref 0–99)
LEUKOCYTE ESTERASE, URINE: NEGATIVE
LYMPHOCYTES ABSOLUTE: 0.32 E9/L (ref 1.5–4)
LYMPHOCYTES RELATIVE PERCENT: 3.5 % (ref 20–42)
MAGNESIUM: 2.3 MG/DL (ref 1.6–2.6)
MCH RBC QN AUTO: 31.7 PG (ref 26–35)
MCHC RBC AUTO-ENTMCNC: 31.3 % (ref 32–34.5)
MCV RBC AUTO: 101.3 FL (ref 80–99.9)
METER GLUCOSE: 197 MG/DL (ref 74–99)
METER GLUCOSE: 253 MG/DL (ref 74–99)
METER GLUCOSE: 308 MG/DL (ref 74–99)
MONOCYTES ABSOLUTE: 0.08 E9/L (ref 0.1–0.95)
MONOCYTES RELATIVE PERCENT: 0.9 % (ref 2–12)
NEUTROPHILS ABSOLUTE: 7.78 E9/L (ref 1.8–7.3)
NEUTROPHILS RELATIVE PERCENT: 95.6 % (ref 43–80)
NITRITE, URINE: NEGATIVE
PDW BLD-RTO: 11.8 FL (ref 11.5–15)
PH UA: 6 (ref 5–9)
PLATELET # BLD: 219 E9/L (ref 130–450)
PMV BLD AUTO: 10.3 FL (ref 7–12)
POTASSIUM SERPL-SCNC: 5.1 MMOL/L (ref 3.5–5)
POTASSIUM, UR: 19.1 MMOL/L
PROCALCITONIN: 0.18 NG/ML (ref 0–0.08)
PROTEIN UA: NEGATIVE MG/DL
RBC # BLD: 3.79 E12/L (ref 3.5–5.5)
RBC UA: ABNORMAL /HPF (ref 0–2)
SODIUM BLD-SCNC: 135 MMOL/L (ref 132–146)
SODIUM URINE: 67 MMOL/L
SPECIFIC GRAVITY UA: 1.02 (ref 1–1.03)
TRIGL SERPL-MCNC: 114 MG/DL (ref 0–149)
UREA NITROGEN, UR: 467 MG/DL (ref 800–1666)
UROBILINOGEN, URINE: 0.2 E.U./DL
VLDLC SERPL CALC-MCNC: 23 MG/DL
WBC # BLD: 8.1 E9/L (ref 4.5–11.5)
WBC UA: ABNORMAL /HPF (ref 0–5)

## 2022-04-12 PROCEDURE — 83735 ASSAY OF MAGNESIUM: CPT

## 2022-04-12 PROCEDURE — 6370000000 HC RX 637 (ALT 250 FOR IP): Performed by: PHYSICIAN ASSISTANT

## 2022-04-12 PROCEDURE — 2580000003 HC RX 258: Performed by: PHYSICIAN ASSISTANT

## 2022-04-12 PROCEDURE — 84145 PROCALCITONIN (PCT): CPT

## 2022-04-12 PROCEDURE — 84300 ASSAY OF URINE SODIUM: CPT

## 2022-04-12 PROCEDURE — 84540 ASSAY OF URINE/UREA-N: CPT

## 2022-04-12 PROCEDURE — 94640 AIRWAY INHALATION TREATMENT: CPT

## 2022-04-12 PROCEDURE — 81001 URINALYSIS AUTO W/SCOPE: CPT

## 2022-04-12 PROCEDURE — 94660 CPAP INITIATION&MGMT: CPT

## 2022-04-12 PROCEDURE — 85378 FIBRIN DEGRADE SEMIQUANT: CPT

## 2022-04-12 PROCEDURE — 80048 BASIC METABOLIC PNL TOTAL CA: CPT

## 2022-04-12 PROCEDURE — 99222 1ST HOSP IP/OBS MODERATE 55: CPT | Performed by: INTERNAL MEDICINE

## 2022-04-12 PROCEDURE — 36415 COLL VENOUS BLD VENIPUNCTURE: CPT

## 2022-04-12 PROCEDURE — 2700000000 HC OXYGEN THERAPY PER DAY

## 2022-04-12 PROCEDURE — 85025 COMPLETE CBC W/AUTO DIFF WBC: CPT

## 2022-04-12 PROCEDURE — 97165 OT EVAL LOW COMPLEX 30 MIN: CPT

## 2022-04-12 PROCEDURE — 82570 ASSAY OF URINE CREATININE: CPT

## 2022-04-12 PROCEDURE — 6370000000 HC RX 637 (ALT 250 FOR IP): Performed by: INTERNAL MEDICINE

## 2022-04-12 PROCEDURE — 82436 ASSAY OF URINE CHLORIDE: CPT

## 2022-04-12 PROCEDURE — 84133 ASSAY OF URINE POTASSIUM: CPT

## 2022-04-12 PROCEDURE — 2500000003 HC RX 250 WO HCPCS: Performed by: PHYSICIAN ASSISTANT

## 2022-04-12 PROCEDURE — S5553 INSULIN LONG ACTING 5 U: HCPCS | Performed by: INTERNAL MEDICINE

## 2022-04-12 PROCEDURE — 97530 THERAPEUTIC ACTIVITIES: CPT

## 2022-04-12 PROCEDURE — APPSS60 APP SPLIT SHARED TIME 46-60 MINUTES: Performed by: NURSE PRACTITIONER

## 2022-04-12 PROCEDURE — 82962 GLUCOSE BLOOD TEST: CPT

## 2022-04-12 PROCEDURE — 97535 SELF CARE MNGMENT TRAINING: CPT

## 2022-04-12 PROCEDURE — 97161 PT EVAL LOW COMPLEX 20 MIN: CPT

## 2022-04-12 PROCEDURE — 6360000002 HC RX W HCPCS: Performed by: PHYSICIAN ASSISTANT

## 2022-04-12 PROCEDURE — 2140000000 HC CCU INTERMEDIATE R&B

## 2022-04-12 PROCEDURE — 80061 LIPID PANEL: CPT

## 2022-04-12 RX ORDER — LIDOCAINE 4 G/G
1 PATCH TOPICAL DAILY
Status: DISCONTINUED | OUTPATIENT
Start: 2022-04-12 | End: 2022-04-19 | Stop reason: HOSPADM

## 2022-04-12 RX ORDER — INSULIN GLARGINE-YFGN 100 [IU]/ML
25 INJECTION, SOLUTION SUBCUTANEOUS NIGHTLY
Status: DISCONTINUED | OUTPATIENT
Start: 2022-04-12 | End: 2022-04-19 | Stop reason: HOSPADM

## 2022-04-12 RX ORDER — NICOTINE 21 MG/24HR
1 PATCH, TRANSDERMAL 24 HOURS TRANSDERMAL DAILY
Status: DISCONTINUED | OUTPATIENT
Start: 2022-04-12 | End: 2022-04-19 | Stop reason: HOSPADM

## 2022-04-12 RX ORDER — PREDNISONE 20 MG/1
40 TABLET ORAL DAILY
Status: DISCONTINUED | OUTPATIENT
Start: 2022-04-12 | End: 2022-04-16

## 2022-04-12 RX ORDER — HYDROCODONE BITARTRATE AND ACETAMINOPHEN 5; 325 MG/1; MG/1
1 TABLET ORAL EVERY 6 HOURS PRN
Status: DISCONTINUED | OUTPATIENT
Start: 2022-04-12 | End: 2022-04-19 | Stop reason: HOSPADM

## 2022-04-12 RX ORDER — TRAMADOL HYDROCHLORIDE 50 MG/1
25 TABLET ORAL EVERY 4 HOURS PRN
Status: DISCONTINUED | OUTPATIENT
Start: 2022-04-12 | End: 2022-04-12

## 2022-04-12 RX ORDER — DOXYCYCLINE HYCLATE 100 MG/1
100 CAPSULE ORAL EVERY 12 HOURS SCHEDULED
Status: DISCONTINUED | OUTPATIENT
Start: 2022-04-12 | End: 2022-04-19 | Stop reason: HOSPADM

## 2022-04-12 RX ADMIN — ASPIRIN 81 MG 81 MG: 81 TABLET ORAL at 09:44

## 2022-04-12 RX ADMIN — TRAZODONE HYDROCHLORIDE 100 MG: 50 TABLET ORAL at 00:19

## 2022-04-12 RX ADMIN — ACETAMINOPHEN 650 MG: 325 TABLET ORAL at 03:40

## 2022-04-12 RX ADMIN — IPRATROPIUM BROMIDE AND ALBUTEROL SULFATE 3 ML: .5; 2.5 SOLUTION RESPIRATORY (INHALATION) at 16:53

## 2022-04-12 RX ADMIN — DULOXETINE HYDROCHLORIDE 60 MG: 60 CAPSULE, DELAYED RELEASE ORAL at 09:44

## 2022-04-12 RX ADMIN — PREDNISONE 40 MG: 20 TABLET ORAL at 14:06

## 2022-04-12 RX ADMIN — FLUTICASONE PROPIONATE 2 SPRAY: 50 SPRAY, METERED NASAL at 09:45

## 2022-04-12 RX ADMIN — SODIUM CHLORIDE, PRESERVATIVE FREE 10 ML: 5 INJECTION INTRAVENOUS at 09:44

## 2022-04-12 RX ADMIN — INSULIN LISPRO 4 UNITS: 100 INJECTION, SOLUTION INTRAVENOUS; SUBCUTANEOUS at 21:12

## 2022-04-12 RX ADMIN — ARFORMOTEROL TARTRATE 15 MCG: 15 SOLUTION RESPIRATORY (INHALATION) at 19:42

## 2022-04-12 RX ADMIN — PANTOPRAZOLE SODIUM 40 MG: 40 TABLET, DELAYED RELEASE ORAL at 09:43

## 2022-04-12 RX ADMIN — HYDRALAZINE HYDROCHLORIDE 50 MG: 25 TABLET, FILM COATED ORAL at 00:19

## 2022-04-12 RX ADMIN — FAMOTIDINE 20 MG: 20 TABLET ORAL at 16:45

## 2022-04-12 RX ADMIN — INSULIN GLARGINE-YFGN 25 UNITS: 100 INJECTION, SOLUTION SUBCUTANEOUS at 21:12

## 2022-04-12 RX ADMIN — DULOXETINE HYDROCHLORIDE 60 MG: 60 CAPSULE, DELAYED RELEASE ORAL at 00:18

## 2022-04-12 RX ADMIN — IPRATROPIUM BROMIDE AND ALBUTEROL SULFATE 3 ML: .5; 2.5 SOLUTION RESPIRATORY (INHALATION) at 19:42

## 2022-04-12 RX ADMIN — INSULIN LISPRO 2 UNITS: 100 INJECTION, SOLUTION INTRAVENOUS; SUBCUTANEOUS at 09:48

## 2022-04-12 RX ADMIN — INSULIN LISPRO 6 UNITS: 100 INJECTION, SOLUTION INTRAVENOUS; SUBCUTANEOUS at 16:50

## 2022-04-12 RX ADMIN — HYDRALAZINE HYDROCHLORIDE 50 MG: 25 TABLET, FILM COATED ORAL at 09:48

## 2022-04-12 RX ADMIN — SODIUM CHLORIDE, PRESERVATIVE FREE 10 ML: 5 INJECTION INTRAVENOUS at 21:12

## 2022-04-12 RX ADMIN — INSULIN LISPRO 2 UNITS: 100 INJECTION, SOLUTION INTRAVENOUS; SUBCUTANEOUS at 12:00

## 2022-04-12 RX ADMIN — CARVEDILOL 25 MG: 25 TABLET, FILM COATED ORAL at 16:46

## 2022-04-12 RX ADMIN — BUMETANIDE 1 MG: 0.25 INJECTION, SOLUTION INTRAMUSCULAR; INTRAVENOUS at 00:18

## 2022-04-12 RX ADMIN — TRAZODONE HYDROCHLORIDE 100 MG: 50 TABLET ORAL at 21:11

## 2022-04-12 RX ADMIN — IPRATROPIUM BROMIDE AND ALBUTEROL SULFATE 3 ML: .5; 2.5 SOLUTION RESPIRATORY (INHALATION) at 12:05

## 2022-04-12 RX ADMIN — DULOXETINE HYDROCHLORIDE 60 MG: 60 CAPSULE, DELAYED RELEASE ORAL at 21:03

## 2022-04-12 RX ADMIN — GABAPENTIN 100 MG: 100 CAPSULE ORAL at 21:03

## 2022-04-12 RX ADMIN — DOXYCYCLINE HYCLATE 100 MG: 100 CAPSULE ORAL at 14:06

## 2022-04-12 RX ADMIN — ISOSORBIDE MONONITRATE 60 MG: 60 TABLET, EXTENDED RELEASE ORAL at 09:44

## 2022-04-12 RX ADMIN — MONTELUKAST SODIUM 10 MG: 10 TABLET ORAL at 21:03

## 2022-04-12 RX ADMIN — ATORVASTATIN CALCIUM 10 MG: 10 TABLET, FILM COATED ORAL at 21:03

## 2022-04-12 RX ADMIN — HYDROCODONE BITARTRATE AND ACETAMINOPHEN 1 TABLET: 5; 325 TABLET ORAL at 11:59

## 2022-04-12 RX ADMIN — GABAPENTIN 100 MG: 100 CAPSULE ORAL at 09:44

## 2022-04-12 RX ADMIN — DOXYCYCLINE HYCLATE 100 MG: 100 CAPSULE ORAL at 21:03

## 2022-04-12 RX ADMIN — MONTELUKAST SODIUM 10 MG: 10 TABLET ORAL at 00:18

## 2022-04-12 RX ADMIN — SODIUM CHLORIDE, PRESERVATIVE FREE 10 ML: 5 INJECTION INTRAVENOUS at 00:19

## 2022-04-12 RX ADMIN — BUMETANIDE 1 MG: 0.25 INJECTION, SOLUTION INTRAMUSCULAR; INTRAVENOUS at 09:45

## 2022-04-12 RX ADMIN — GABAPENTIN 100 MG: 100 CAPSULE ORAL at 00:19

## 2022-04-12 RX ADMIN — CETIRIZINE HYDROCHLORIDE 10 MG: 10 TABLET, FILM COATED ORAL at 09:44

## 2022-04-12 RX ADMIN — SACUBITRIL AND VALSARTAN 1 TABLET: 49; 51 TABLET, FILM COATED ORAL at 00:18

## 2022-04-12 RX ADMIN — ENOXAPARIN SODIUM 40 MG: 100 INJECTION SUBCUTANEOUS at 09:44

## 2022-04-12 RX ADMIN — CARVEDILOL 25 MG: 25 TABLET, FILM COATED ORAL at 09:44

## 2022-04-12 RX ADMIN — ATORVASTATIN CALCIUM 10 MG: 10 TABLET, FILM COATED ORAL at 00:18

## 2022-04-12 RX ADMIN — HYDRALAZINE HYDROCHLORIDE 50 MG: 25 TABLET, FILM COATED ORAL at 14:07

## 2022-04-12 RX ADMIN — INSULIN LISPRO 3 UNITS: 100 INJECTION, SOLUTION INTRAVENOUS; SUBCUTANEOUS at 00:17

## 2022-04-12 RX ADMIN — IPRATROPIUM BROMIDE AND ALBUTEROL SULFATE 3 ML: .5; 2.5 SOLUTION RESPIRATORY (INHALATION) at 07:39

## 2022-04-12 ASSESSMENT — PAIN DESCRIPTION - LOCATION
LOCATION: GENERALIZED
LOCATION: BACK
LOCATION: BACK

## 2022-04-12 ASSESSMENT — PAIN SCALES - GENERAL
PAINLEVEL_OUTOF10: 0
PAINLEVEL_OUTOF10: 3
PAINLEVEL_OUTOF10: 7
PAINLEVEL_OUTOF10: 0
PAINLEVEL_OUTOF10: 10

## 2022-04-12 ASSESSMENT — PAIN DESCRIPTION - ORIENTATION
ORIENTATION: RIGHT

## 2022-04-12 ASSESSMENT — PAIN DESCRIPTION - FREQUENCY
FREQUENCY: CONTINUOUS
FREQUENCY: CONTINUOUS

## 2022-04-12 ASSESSMENT — PAIN - FUNCTIONAL ASSESSMENT
PAIN_FUNCTIONAL_ASSESSMENT: ACTIVITIES ARE NOT PREVENTED
PAIN_FUNCTIONAL_ASSESSMENT: ACTIVITIES ARE NOT PREVENTED

## 2022-04-12 ASSESSMENT — PAIN DESCRIPTION - DESCRIPTORS
DESCRIPTORS: ACHING;CONSTANT;DISCOMFORT
DESCRIPTORS: CONSTANT;SHARP

## 2022-04-12 ASSESSMENT — PAIN DESCRIPTION - PAIN TYPE
TYPE: ACUTE PAIN

## 2022-04-12 ASSESSMENT — PAIN DESCRIPTION - PROGRESSION
CLINICAL_PROGRESSION: NOT CHANGED
CLINICAL_PROGRESSION: NOT CHANGED

## 2022-04-12 ASSESSMENT — PAIN DESCRIPTION - ONSET
ONSET: ON-GOING
ONSET: ON-GOING

## 2022-04-12 NOTE — PROGRESS NOTES
Nutrition Education      Counseled patient on heart healthy/diabetic diet. Provided educational handouts and sample meal plans. · Written educational materials provided. · Contact name and number provided. · Refer to Patient Education activity for more details.     Electronically signed by Sriram Liu RD, LD on 4/12/22 at 12:17 PM EDT    Contact: [phone]

## 2022-04-12 NOTE — H&P
[id number]  228[facility]  History and Physical      CHIEF COMPLAINT:    Chief Complaint   Patient presents with    Respiratory Distress     increased SOB the last 4 days. was 90% on trilogy oxygen concentrator at home. EMS placed on CPAP, arrived at 95%, hx of COPD         Patient of Tiny Jack  presents with:  Acute on chronic diastolic (congestive) heart failure (HCC)    History of Present Illness: The patient is a poor historian. She was kind of obsessing over not be able to have rye bread with her turkey sandwiches. She wants to have turkey sandwiches for all of her meals. In this context it was hard to refocus her on her actual medical complaints. He kept getting off onto tangents. From what I can gather she has had shortness of breath for about 3 or 4 days. She denies leg swelling but does complain of abdominal swelling. She reports a fever of 101.2 this past Thursday but no other fevers. She reports cough productive of thick sputum. She has had no cough during my time talking to her and no fevers during this admission so far though. She reports a slight twinge of sharp chest pain on the right lower side of the chest when she takes a deep breath. She feels that due to congestive heart failure she is having back pain but she gestures more towards her right shoulder. This is nonradiating and she is insistent that Tylenol is not strong enough for it. She still smokes. She does not wear oxygen during the daytime. She uses a trilogy ventilator at night. There are no sick contacts. There are no other associated symptoms. Of note, on arrival yesterday her blood pressure was over 200. She denies any unusual stressors in her life. She states she is compliant with her blood pressure medications. REVIEW OF SYSTEMS:  Pertinent negatives are above in HPI. 10 point ROS otherwise negative.       Past Medical History:   Diagnosis Date    Asthma     Atherosclerosis of native artery of left leg with rest pain ([address] 11/09/2018    CAD (coronary artery disease) 2011    Cellulitis and abscess of trunk 04/14/2015    Cerebellar infarct ([address] 04/03/2019    Remote, rt. cerebellum, head CT scan, 1/9/19    Chronic back pain     Chronic kidney disease     Chronic systolic congestive heart failure ([address] 10/04/2013    Chronic venous insufficiency 10/11/2017    COPD (chronic obstructive pulmonary disease) ([address]     COVID-19     Depression     Diabetes mellitus ([address]     Diabetic neuropathy ([address]     Diverticulosis     Fatty liver 01/08/2016    per US    Glaucoma, open angle 02/01/2016    Mild-OU    Hepatic encephalopathy ([address] 02/07/2016    resolved    Hiatal hernia     History of blood transfusion     Hyperlipidemia     Hyperplastic colon polyp     Hypertension     Incontinence     Liver mass     Morbid obesity ([address]     Movement disorder     Myocardial infarction ([address] 2011    O2 dependent 09/16/2021    with bipap 3 liters    Osteoarthritis, generalized     Pain in both lower legs 10/11/2017    Peripheral vascular disease ([address]     Pneumonia 01/26/2014    Pulmonary edema     resolved    PVD (peripheral vascular disease) with claudication ([address] 08/30/2017    Sleep apnea     uses BI PAP    Status post peripheral artery angioplasty 10/31/2019    Tobacco abuse     Tobacco abuse 10/11/2017    Tubular adenoma polyp of rectum     Urinary tract infection due to ESBL Klebsiella 03/08/2017    Ventral hernia          Past Surgical History:   Procedure Laterality Date    ANGIOPLASTY  11/13/2018    Dr. Leobardo Condon & athrectomy L SFA & Popliteal    BREAST SURGERY  2000    bilateral reduction    BRONCHIAL BRUSH BIOPSY  1/25/2013    Dr Pau Robertson  04/20/2015    Dr. Nayely London  12/2011     DR. Ariana Jamison,  follows Dr Darwin Lima  11/15/2013    Dr Evita Sandoval COLONOSCOPY  12/23/2016 MG/3ML SOLN nebulizer solution, Take 1 vial by nebulization every 4 hours as needed for Shortness of Breath  isosorbide mononitrate (IMDUR) 60 MG extended release tablet, Take 60 mg by mouth daily  montelukast (SINGULAIR) 10 MG tablet, Take 10 mg by mouth nightly  mirabegron (MYRBETRIQ) 50 MG TB24, Take 50 mg by mouth at bedtime  simvastatin (ZOCOR) 20 MG tablet, Take 20 mg by mouth nightly  tiotropium (SPIRIVA RESPIMAT) 1.25 MCG/ACT AERS inhaler, Inhale 2 puffs into the lungs daily  traZODone (DESYREL) 100 MG tablet, Take 100 mg by mouth nightly  vitamin D (CHOLECALCIFEROL) 25 MCG (1000 UT) TABS tablet, Take 1,000 Units by mouth daily  bumetanide (BUMEX) 1 MG tablet, Take 1 tablet by mouth daily  spironolactone (ALDACTONE) 25 MG tablet, Take 1 tablet by mouth daily  carvedilol (COREG) 25 MG tablet, Take 1 tablet by mouth 2 times daily (with meals)  hydrALAZINE (APRESOLINE) 50 MG tablet, Take 50 mg by mouth 3 times daily  pantoprazole (PROTONIX) 40 MG tablet, Take 40 mg by mouth daily   metoclopramide (REGLAN) 5 MG tablet, Take 5 mg by mouth 3 times daily as needed   docusate sodium (COLACE) 100 MG capsule, Take 200 mg by mouth at bedtime     Note that the patient's home medications were reviewed and the above list is accurate to the best of my knowledge at the time of the exam.    Allergies:    Latex, Bee venom, Dilaudid [hydromorphone hcl], Dye [iodides], Percocet [oxycodone-acetaminophen], Keflex [cephalexin], Lasix [furosemide], Levaquin [levofloxacin in d5w], Lipitor, Lyrica [pregabalin], Morphine, Naproxen, Nefazodone, Norvasc [amlodipine], Oxycodone-acetaminophen, Shellfish-derived products, and Trazodone and nefazodone    Social History:    reports that she has been smoking cigarettes. She started smoking about 47 years ago. She has a 10.00 pack-year smoking history. She has never used smokeless tobacco. She reports current alcohol use. She reports previous drug use. Drug: Marijuana Cyndy Black.     Family History:   family history includes Asthma in her father; Cancer in her mother. PHYSICAL EXAM:    Vitals:  /66   Pulse 61   Temp 97 °F (36.1 °C) (Temporal)   Resp 16   Ht 5' 2\" (1.575 m)   Wt 211 lb (95.7 kg)   LMP 01/01/1990   SpO2 98%   BMI 38.59 kg/m²       General appearance: NAD, conversant. Very obese and chronically unhealthy appearing. Nontoxic very comfortable  Eyes: Sclerae anicteric, PERRLA  HEENT: AT/NC, MMM  Neck: FROM, supple, no thyromegaly  Lymph: No cervical / supraclavicular lymphadenopathy  Lungs: B/L diffuse wheezing. Scatted rhonchi. WOB normal.  Back: FROM nontender. CV: RRR, no MRGs, no lower extremity edema  Abdomen: Soft, non-tender; no masses or HSM, +BS  Extremities: FROM without synovitis. No clubbing or cyanosis of the hands. Skin: no rash, induration, lesions, or ulcers  Psych: Calm and cooperative. Normal judgement and insight. Normal mood and affect. Neuro: Alert and interactive, face symmetric, speech fluent. LABS:  All labs reviewed.   Of note:  CBC with Differential:    Lab Results   Component Value Date    WBC 8.1 04/12/2022    RBC 3.79 04/12/2022    HGB 12.0 04/12/2022    HCT 38.4 04/12/2022     04/12/2022    .3 04/12/2022    MCH 31.7 04/12/2022    MCHC 31.3 04/12/2022    RDW 11.8 04/12/2022    NRBC 0.0 04/22/2021    SEGSPCT 84 03/03/2014    LYMPHOPCT 6.5 04/11/2022    MONOPCT 5.7 04/11/2022    BASOPCT 0.4 04/11/2022    MONOSABS 0.58 04/11/2022    LYMPHSABS 0.66 04/11/2022    EOSABS 0.04 04/11/2022    BASOSABS 0.04 04/11/2022     CMP:    Lab Results   Component Value Date     04/12/2022    K 5.1 04/12/2022    K 4.9 04/11/2022    CL 98 04/12/2022    CO2 27 04/12/2022    BUN 33 04/12/2022    CREATININE 1.6 04/12/2022    GFRAA 39 04/12/2022    LABGLOM 39 04/12/2022    GLUCOSE 199 04/12/2022    GLUCOSE 181 12/01/2011    PROT 6.3 04/11/2022    LABALBU 3.3 04/11/2022    LABALBU 5.2 12/01/2011    CALCIUM 8.5 04/12/2022    BILITOT 0.5 04/11/2022    ALKPHOS 71 04/11/2022    AST 18 04/11/2022    ALT 15 04/11/2022       Imaging:  I've personally reviewed the patient's CXR: B/L infiltrates suspicious for pulmonary edema    EKG:  I've personally reviewed the patient's EKG:  NSR no acute ischemic changes. RBBB, reciprocal TW changes    ASSESSMENT/PLAN:  Principal Problem:    Acute on chronic diastolic (congestive) heart failure ([facility])  Active Problems:    Chronic respiratory failure with hypoxia and hypercapnia ([facility])    DAYAN and COPD overlap syndrome ([facility])    DM2 (diabetes mellitus, type 2) ([facility])    Severe obesity (BMI 35.0-35.9 with comorbidity) ([facility]    Encounter for tobacco use cessation counseling    Prolonged Q-T interval on ECG    COPD exacerbation ([facility])    Respiratory distress    CKD (chronic kidney disease) stage 3, GFR 30-59 ml/min ([facility])    Hypertensive emergency    Acute kidney injury superimposed on CKD ([facility]  Resolved Problems:    * No resolved hospital problems. *      IV Bumex    ALEC above baseline creatinine ~1.2. Hold entresto/spironolactone. Pt requesting nephrology consult. Sliding scale + glargine    IV labetalol given on admission, BP better. Continue imdur, hydralazine, carvedilol, adjust as needed    BiPAP PRN resp distress and at night    Slightly prolonged QTc, no major culprits on med list but use SSRI with caution. Monitor on tele and avoid hypokalemia/hypomagnesemia    COPD exacerbation. Reports fever at home a few days ago. Afebrile / no WBC here. Check procal.  Start prednisone, doxy. Hold off on stronger abx unless more convincing signs of significant infection. Nicotine patch. Counseled about smoking cessation. Smoking herself to death. Very slight R pleuritic-type discomfort. Doubt PE. Reports allergy to IV contrast, plus kidneys not great, plus there is a clear other cause of her dyspnea. Check d-dimer    VERY chronically ill lady.   Huge med list.  Also asking for pain for back pain which she claims is related to CHF but I think may be chronic/MSK. She keeps asking for \"something stronger\". Tramadol can further prolong QTc. Will give a little norco.  Lidocaine patch.     DVT prophylaxis: Lovenox  Code status: full  Requires inpatient level of care  Pam Cotton MD    7:24 AM  4/12/2022

## 2022-04-12 NOTE — PROGRESS NOTES
"Preadmit appointment: \" Preparing for your Procedure information\" sheet given to patient with verbal and written instructions. Patient instructed to continue prescribed medications through the day before surgery, instructed to take the following medications the day of surgery per anesthesia protocol: Advair, Albuterol if needed (pt instructed to bring Albuterol day of procedure), Levothyroxine, Liothyronine, Pantoprazole, Phenergan if needed.  Pt states she had appt this week with her endocrinologist and discussed NPO and insulin instructions prior to procedure 8/25/2017.   " Occupational Therapy  OCCUPATIONAL THERAPY INITIAL EVALUATION    [address] [address] [address]      NPW                                                Patient Name: Annie Kauffman  MRN: [id number]  : 1955  Room: 9997/9183-J    Evaluating OT: Danita Seip OTR/L #[id number]     Referring Provider: TONY Niño  Specific Provider Orders/Date: OT eval and treat 22    Diagnosis: Respiratory distress [R06.03]  CHF (congestive heart failure), NYHA class I, acute on chronic, combined ([address] [I50.43]   Pt admitted to hospital with SOB. Pertinent Medical History:  has a past medical history of Asthma, Atherosclerosis of native artery of left leg with rest pain ([address] CAD (coronary artery disease), Cellulitis and abscess of trunk, Cerebellar infarct (HCC), Chronic back pain, Chronic kidney disease, Chronic systolic congestive heart failure ([address] Chronic venous insufficiency, COPD (chronic obstructive pulmonary disease) ([address] COVID-19, Depression, Diabetes mellitus ([address] Diabetic neuropathy ([address] Diverticulosis, Fatty liver, Glaucoma, open angle, Hepatic encephalopathy ([address] Hiatal hernia, History of blood transfusion, Hyperlipidemia, Hyperplastic colon polyp, Hypertension, Incontinence, Liver mass, Morbid obesity ([address] Movement disorder, Myocardial infarction ([address] O2 dependent, Osteoarthritis, generalized, Pain in both lower legs, Peripheral vascular disease ([address] Pneumonia, Pulmonary edema, PVD (peripheral vascular disease) with claudication ([address] Sleep apnea, Status post peripheral artery angioplasty, Tobacco abuse, Tobacco abuse, Tubular adenoma polyp of rectum, Urinary tract infection due to ESBL Klebsiella, and Ventral hernia.        Precautions:  Fall Risk, O2, TAPS    Assessment of current deficits    [x] Functional mobility  [x]ADLs  [x] Strength               []Cognition    [x] Functional transfers   [x] IADLs         [x] Safety Awareness   [x]Endurance    [] Fine Coordination              [x] Balance      [] Vision/perception   []Sensation     []Gross Motor Coordination  [] ROM  [] Delirium                   [] Motor Control     OT PLAN OF CARE   OT POC based on physician orders, patient diagnosis and results of clinical assessment    Frequency/Duration 1-3 days/wk for 2 weeks PRN   Specific OT Treatment Interventions to include:   * Instruction/training on adapted ADL techniques and AE recommendations to increase functional independence within precautions       * Training on energy conservation strategies, correct breathing pattern and techniques to improve independence/tolerance for self-care routine  * Functional transfer/mobility training/DME recommendations for increased independence, safety, and fall prevention  * Patient/Family education to increase follow through with safety techniques and functional independence  * Recommendation of environmental modifications for increased safety with functional transfers/mobility and ADLs  * Therapeutic exercise to improve motor endurance, ROM, and functional strength for ADLs/functional transfers  * Therapeutic activities to facilitate/challenge dynamic balance, stand tolerance for increased safety and independence with ADLs  * Therapeutic activities to facilitate gross/fine motor skills for increased independence with ADLs  * Neuro-muscular re-education: facilitation of righting/equilibrium reactions, midline orientation, scapular stability/mobility, normalization of muscle tone, and facilitation of volitional active controled movement    Recommended Adaptive Equipment:  TBD     Home Living: Pt lives alone in a 1 story home with 2 VAZQUEZ and 1 hand rail.      Bathroom setup: walk-in shower with seat    Equipment owned: rollator, cane, shower chair    Prior Level of Function: assist prn with ADLs , assist prn with IADLs; ambulated with rollator (Pt reports [facility] aides 7 days /week for 5 hours per day; they assist prn)   Driving: no   Occupation: na    Pain Level: Pt reports back and BLE pain rated at 10/10; Therapist facilitated repositioning to address pain      Cognition: A&O: 4/4; Follows 2 step directions   Memory:  good   Sequencing:  good   Problem solving:  fair   Judgement/safety:  fair     Functional Assessment:  AM-PAC Daily Activity Raw Score: 19/24   Initial Eval Status  Date: 4/12/22 Treatment Status  Date: STGs = LTGs  Time frame: 10-14 days   Feeding Independent      Grooming Stand by Assist     seated  Modified Gregory    UB Dressing Stand by Assist   Modified Gregory    LB Dressing Minimal Assist   Modified Gregory    Bathing Minimal Assist  Modified Gregory    Toileting Minimal Assist   Modified Gregory    Bed Mobility  Supine to sit: Stand by Assist   Sit to supine: Stand by Assist   Supine to sit: Modified Gregory   Sit to supine: Modified Gregory    Functional Transfers Stand by Assist   Modified Gregory    Functional Mobility Stand by Assist   Modified Gregory    Balance Sitting:     Static:  Sup    Dynamic:SBA  Standing: SBA     Activity Tolerance F-    Limited due to fatigue and SOB  F+   Visual/  Perceptual wfl                  Hand Dominance right   Strength ROM Additional Info:    RUE   4-/5 wfl good  and wfl FMC/dexterity noted during ADL tasks     LUE 4-/5 wfl good  and wfl FMC/dexterity noted during ADL tasks     Hearing: wfl  Sensation:wfl  Tone: wfl  Edema:none noted     Comments: Upon arrival patient supine in bed and agreeable to OT Session. Therapist educated pt on role of OT. At end of session, patient semi-supine in bed with call light and phone within reach, all lines and tubes intact. Overall patient demonstrated decreased independence and safety during completion of ADL/functional transfer/mobility tasks.   Pt would benefit from continued skilled OT to increase safety and independence with completion of ADL/IADL tasks for functional independence and quality of life. Treatment: OT treatment provided this date includes: Facilitation of bed mobility, unsupported sitting balance at EOB (impacting ADLs; addressing posture, weight shifting, dynamic reaching), functional transfers, standing tolerance tasks (addressing posture, balance and activity tolerance; impacting ADLs and functional activity) and short functional ambulation tasks with w/w ( in preparation for item retrieval tasks; cuing on posture, w/w management and safety) - skilled cuing on hand placement, posture, body mechanics, energy conservation techniques and safety. Therapist facilitated self-care retraining: UB/LB self-care tasks (robe, socks) and grooming tasks while educating pt on modified techniques, posture, safety and energy conservation techniques. Skilled monitoring of HR, O2 sats and pts response to treatment. Rehab Potential: Good  for established goals     Patient / Family Goal: return home      Patient and/or family were instructed on functional diagnosis, prognosis/goals and OT plan of care. Demonstrated fair understanding. Eval Complexity: Low    Time In: 1010  Time Out: 1035  Total Treatment Time: 10 minutes    Min Units   OT Eval Low 97165  x  1   OT Eval Medium 08255      OT Eval High 83650      OT Re-Eval [id number]       Therapeutic Ex 25326       Therapeutic Activities 21769  2     ADL/Self Care 32864  8  1   Orthotic Management 89002       Manual [id number]     Neuro Re-Ed 68388       Non-Billable Time          Evaluation Time additionally includes thorough review of current medical information, gathering information on past medical history/social history and prior level of function, interpretation of standardized testing/informal observation of tasks, assessment of data and development of plan of care and goals.           Ananya Yang OTR/L #[id number]

## 2022-04-12 NOTE — PROGRESS NOTES
Date: 4/12/2022    Time: 7:42 AM    Patient Placed On BIPAP/CPAP/ Non-Invasive Ventilation? No, Pt remains on bipap. .    If no must comment. Facial area red/color change? No           If YES are Blister/Lesion present?no If yes must notify nursing staff  BIPAP/CPAP skin barrier?   Yes    Skin barrier type:mepilexlite       Comments:        Sven Gardner RCP

## 2022-04-12 NOTE — TELEPHONE ENCOUNTER
MA spoke with patient, per Dr. Joshua Burris pathology is normal and can be discussed when she reschedules her appointment.     Electronically signed by Michael Madison MA on 4/12/22 at 3:09 PM EDT

## 2022-04-12 NOTE — CARE COORDINATION
4/12/22 Transition of Care: Patient is here due to shortness of breath over the last couple of days. She uses trilogy oxygen at home. She has cpap also thru [email or web address]. Her saturation at home was 90% on the trilogy. She is on iv bumex bid. Heart failure nurse is following. Echo is pending. Cardiology and renal are following. She is currently on O2 as her sat 90% on room air per document. She lives alone in a one story home. She uses a rollator and has a cane and shower chair. She follows with Dr Violet Olivarez and her pharmacy is [facility]. She has homecare services thru [employer] [facility] M-Sunday. She has a , [employer] at [phone]. PT 17/24 and OT 19/24. Will continue to follow for home going needs.  Electronically signed by Salinas Espinoza RN CM on 4/12/2022 at 3:10 PM

## 2022-04-12 NOTE — CONSULTS
[facility]  Nephrology Consult Note    Patient's Name: Kavitha Ruffin     Reason for Consult:   Mild ALEC on CKD     Chief Complaint:   Shortness of breath  History Obtained From:  patient, past medical records and EMR     History of Present Illness:    Kavitha Ruffin is a 78 y.o. female with a past medical history of peripheral vascular disease, COPD, Covid, diverticulosis, coronary artery disease, hypertension, and hyperlipidemia. She presented to the ED on 4/11, per the H&P for shortness of breath x4 days. Vital signs at presentation to the ED include temperature 98.3, respirations 18, pulse 143, /169, and she was 95% on the CPAP. Lab data at presentation to the ED include CO2 21, creatinine 1.2, calcium 8.2, and albumin 3.3. Chest x-ray showed \"bilateral airspace disease. \"  We were consulted to see the patient for mild ALEC on CKD. Her baseline creatinine from July 2021 was 1.1-1.2. At present, patient was seen and examined. She explained to me that she came in for shortness of breath. She currently denies any chest pain. However, she explained that she continues to have intermittent shortness of breath. She denies any nausea or vomiting. She reports that she was having abdominal pain. She also explained that she has been experiencing weakness and fatigue. Prior to admission the patient explained that she was drinking okay, but did not have a good appetite.     PMH:    Past Medical History:   Diagnosis Date    Asthma     Atherosclerosis of native artery of left leg with rest pain ([facility] [facility] 11/09/2018    CAD (coronary artery disease) 2011    Cellulitis and abscess of trunk 04/14/2015    Cerebellar infarct ([facility] [facility] 04/03/2019    Remote, rt. cerebellum, head CT scan, 1/9/19    Chronic back pain     Chronic kidney disease     Chronic systolic congestive heart failure ([address] 10/04/2013    Chronic venous insufficiency 10/11/2017    COPD (chronic obstructive pulmonary disease) ([facility] [facility]     COVID-19     Depression     Diabetes mellitus ([address]     Diabetic neuropathy ([address]     Diverticulosis     Fatty liver 01/08/2016    per [place]    Glaucoma, open angle 02/01/2016    Mild-OU    Hepatic encephalopathy ([address] 02/07/2016    resolved    Hiatal hernia     History of blood transfusion     Hyperlipidemia     Hyperplastic colon polyp     Hypertension     Incontinence     Liver mass     Morbid obesity ([address]     Movement disorder     Myocardial infarction ([address] 2011    O2 dependent 09/16/2021    with bipap 3 liters    Osteoarthritis, generalized     Pain in both lower legs 10/11/2017    Peripheral vascular disease ([address]     Pneumonia 01/26/2014    Pulmonary edema     resolved    PVD (peripheral vascular disease) with claudication ([address] 08/30/2017    Sleep apnea     uses BI PAP    Status post peripheral artery angioplasty 10/31/2019    Tobacco abuse     Tobacco abuse 10/11/2017    Tubular adenoma polyp of rectum     Urinary tract infection due to ESBL Klebsiella 03/08/2017    Ventral hernia      Patient Active Problem List   Diagnosis    Severe obesity (BMI 35.0-35.9 with comorbidity) ([address]    Hyperlipidemia    DAYAN and COPD overlap syndrome ([address]    Vitamin D insufficiency    Chronic combined systolic and diastolic heart failure (HCC)    GERD (gastroesophageal reflux disease)    Major depressive disorder, recurrent episode, mild ([address]    Diabetic polyneuropathy associated with type 2 diabetes mellitus (HCC)    Chronic passive hepatic congestion    Mixed incontinence urge and stress    Chronic back pain - d/t muscle spasm    Glaucoma, open angle    Elevated CA 19-9 level    Lumbar stenosis    Spondylosis 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    Lumbar disc herniation    Asymmetric septal hypertrophy (HCC)    Non-compliance    Hiatal hernia    Melanosis coli    Coronary artery disease involving native coronary artery of native heart without angina pectoris    DM2 (diabetes mellitus, type 2) ([facility]    Cigarette smoker    Marijuana use, smoked    Ischemic cardiomyopathy    Encounter for tobacco use cessation counseling    PVD (peripheral vascular disease) with claudication ([facility]    Chronic venous insufficiency    History of non-ST elevation myocardial infarction (NSTEMI)    Prolonged Q-T interval on ECG    History of stroke    COPD exacerbation ([facility])    Chronic respiratory failure with hypoxia and hypercapnia ([facility])    COPD (chronic obstructive pulmonary disease) ([facility])    Status post peripheral artery angioplasty    Uncontrolled hypertension    O2 dependent    Respiratory distress    Acute on chronic diastolic (congestive) heart failure ([facility])    CKD (chronic kidney disease) stage 3, GFR 30-59 ml/min ([facility])    Hypertensive emergency    Acute kidney injury superimposed on CKD ([facility])     Meds:     insulin glargine  25 Units SubCUTAneous Nightly    lidocaine  1 patch TransDERmal Daily    predniSONE  40 mg Oral Daily    doxycycline hyclate  100 mg Oral 2 times per day    nicotine  1 patch TransDERmal Daily    labetalol  10 mg IntraVENous Once    aspirin  81 mg Oral Daily    carvedilol  25 mg Oral BID WC    cetirizine  10 mg Oral Daily    DULoxetine  60 mg Oral BID    famotidine  20 mg Oral Dinner    fluticasone  2 spray Nasal Daily    gabapentin  100 mg Oral BID    hydrALAZINE  50 mg Oral TID    ipratropium-albuterol  1 vial Nebulization Q4H WA    isosorbide mononitrate  60 mg Oral Daily    mirabegron  50 mg Oral Nightly    montelukast  10 mg Oral Nightly    pantoprazole  40 mg Oral Daily    [Held by provider] sacubitril-valsartan  1 tablet Oral BID    atorvastatin  10 mg Oral Nightly    [Held by provider] spironolactone  25 mg Oral Daily    traZODone  100 mg Oral Nightly    sodium chloride flush  5-40 mL IntraVENous 2 times per day    enoxaparin  40 mg SubCUTAneous Daily    insulin lispro  0-12 Units SubCUTAneous TID WC  insulin lispro  0-6 Units SubCUTAneous Nightly    bumetanide  1 mg IntraVENous BID      sodium chloride      dextrose       Meds prn:     HYDROcodone 5 mg - acetaminophen, Arformoterol Tartrate, sodium chloride flush, sodium chloride, polyethylene glycol, acetaminophen **OR** acetaminophen, glucose, dextrose, glucagon (rDNA), dextrose, magnesium sulfate, potassium chloride **OR** potassium alternative oral replacement **OR** potassium chloride, hydrALAZINE    Meds prior to admission:     No current facility-administered medications on file prior to encounter. Current Outpatient Medications on File Prior to Encounter   Medication Sig Dispense Refill    acetaminophen (TYLENOL) 325 MG tablet Take 650 mg by mouth every 6 hours as needed for Pain      aspirin 81 MG chewable tablet Take 81 mg by mouth daily      Arformoterol Tartrate (BROVANA) 15 MCG/2ML NEBU Take 1 ampule by nebulization 2 times daily as needed      azelastine (ASTELIN) 0.1 % nasal spray 1 spray by Nasal route 2 times daily as needed for Rhinitis      cetirizine (ZYRTEC) 10 MG tablet Take 10 mg by mouth daily      DULoxetine (CYMBALTA) 60 MG extended release capsule Take 60 mg by mouth 2 times daily      sacubitril-valsartan (ENTRESTO) 49-51 MG per tablet Take 1 tablet by mouth 2 times daily      famotidine (PEPCID) 40 MG tablet Take 40 mg by mouth Daily with supper      fluticasone (FLONASE) 50 MCG/ACT nasal spray 2 sprays by Nasal route daily      gabapentin (NEURONTIN) 100 MG capsule Take 100 mg by mouth 2 times daily.       Insulin Degludec (TRESIBA FLEXTOUCH) 200 UNIT/ML SOPN Inject 50 Units into the skin at bedtime      insulin lispro, 1 Unit Dial, (HUMALOG KWIKPEN) 100 UNIT/ML SOPN Inject 0-6 Units into the skin 3 times daily (before meals) *Per Sliding Scale*      ipratropium-albuterol (DUONEB) 0.5-2.5 (3) MG/3ML SOLN nebulizer solution Take 1 vial by nebulization every 4 hours as needed for Shortness of Breath      isosorbide mononitrate (IMDUR) 60 MG extended release tablet Take 60 mg by mouth daily      montelukast (SINGULAIR) 10 MG tablet Take 10 mg by mouth nightly      mirabegron (MYRBETRIQ) 50 MG TB24 Take 50 mg by mouth at bedtime      simvastatin (ZOCOR) 20 MG tablet Take 20 mg by mouth nightly      tiotropium (SPIRIVA RESPIMAT) 1.25 MCG/ACT AERS inhaler Inhale 2 puffs into the lungs daily      traZODone (DESYREL) 100 MG tablet Take 100 mg by mouth nightly      vitamin D (CHOLECALCIFEROL) 25 MCG (1000 UT) TABS tablet Take 1,000 Units by mouth daily      bumetanide (BUMEX) 1 MG tablet Take 1 tablet by mouth daily 30 tablet 11    spironolactone (ALDACTONE) 25 MG tablet Take 1 tablet by mouth daily 90 tablet 3    carvedilol (COREG) 25 MG tablet Take 1 tablet by mouth 2 times daily (with meals) 180 tablet 3    hydrALAZINE (APRESOLINE) 50 MG tablet Take 50 mg by mouth 3 times daily      pantoprazole (PROTONIX) 40 MG tablet Take 40 mg by mouth daily       metoclopramide (REGLAN) 5 MG tablet Take 5 mg by mouth 3 times daily as needed  120 tablet 3    docusate sodium (COLACE) 100 MG capsule Take 200 mg by mouth at bedtime        Allergies:    Latex, Bee venom, Dilaudid [hydromorphone hcl], Dye [iodides], Percocet [oxycodone-acetaminophen], Keflex [cephalexin], Lasix [furosemide], Levaquin [levofloxacin in d5w], Lipitor, Lyrica [pregabalin], Morphine, Naproxen, Nefazodone, Norvasc [amlodipine], Oxycodone-acetaminophen, Shellfish-derived products, and Trazodone and nefazodone    Social History:     reports that she has been smoking cigarettes. She started smoking about 47 years ago. She has a 10.00 pack-year smoking history. She has never used smokeless tobacco. She reports current alcohol use. She reports previous drug use. Drug: Marijuana Joni Relic). Family History:         Problem Relation Age of Onset    Cancer Mother     Asthma Father      Review of Systems:   Pertinent items are noted in HPI.     Physical Exam:    Patient Vitals for the past 24 hrs:   BP Temp Temp src Pulse Resp SpO2 Height Weight   04/12/22 1109 (!) 132/59 96.4 °F (35.8 °C) Tympanic 56 17 98 % -- --   04/12/22 0815 (!) 162/85 97 °F (36.1 °C) Temporal 66 18 95 % -- --   04/12/22 0740 -- -- -- -- 20 97 % -- --   04/12/22 0340 100/66 97 °F (36.1 °C) Temporal 61 16 98 % -- --   04/12/22 0020 -- -- -- 66 -- -- -- --   04/11/22 2204 -- -- -- -- 17 -- -- --   04/11/22 2107 123/71 97.1 °F (36.2 °C) Temporal 69 18 98 % 5' 2\" (1.575 m) 211 lb (95.7 kg)   04/11/22 2017 (!) 146/86 -- -- 74 22 -- -- --   04/11/22 1441 (!) 176/77 -- -- 67 18 100 % -- --   04/11/22 1248 -- -- -- -- 26 99 % -- --   04/11/22 1247 -- -- -- -- 19 100 % -- --   04/11/22 1246 -- -- -- -- 24 100 % -- --   04/11/22 1229 (!) 156/93 -- -- 77 20 100 % -- --       Intake/Output Summary (Last 24 hours) at 4/12/2022 1143  Last data filed at 4/12/2022 0340  Gross per 24 hour   Intake --   Output 300 ml   Net -300 ml     General: Awake, alert, no acute distress  Neck: No JVD noted  Lungs:  Crackles bilaterally upper, diminished to the bases bilaterally. Unlabored  CV: Regular rate and rhythm. No rub  Abd:  Rounded, soft, nontender, nondistended. Active bowel sounds  Skin: Warm and dry. No rash on exposed extremities  Ext: No edema   Neuro: Awake, answers questions appropriately    Data:    Recent Labs     04/11/22  1004 04/12/22  0541   WBC 10.1 8.1   HGB 14.7 12.0   HCT 46.1 38.4   MCV 99.6 101.3*    219     Recent Labs     04/11/22  1122 04/11/22  1859 04/12/22  0541     --  135   K 4.9  --  5.1*     --  98   CO2 21*  --  27   CREATININE 1.2*  --  1.6*   BUN 22  --  33*   LABGLOM 54  --  39   GLUCOSE 274* 342 199*   CALCIUM 8.2*  --  8.5*   MG  --   --  2.3     Vit D, 25-Hydroxy   Date Value Ref Range Status   02/05/2021 29 (L) 30 - 100 ng/mL Final     Comment:     <20 ng/mL. ........... Sandy Luciano Deficient  20-30 ng/mL. ......... Sandy Luciano Insufficient   ng/mL. ........ Sandy Luciano Sufficient  >100 ng/mL........... [facility]       No results found for: PTH    Recent Labs     04/11/22  1122   ALT 15   AST 18   ALKPHOS 71   BILITOT 0.5     Recent Labs     04/11/22  1122   LABALBU 3.3*     Ferritin   Date Value Ref Range Status   11/02/2017 172 ng/mL Final     Comment:     FERRITIN Reference Ranges:  Adult Males   20 - 60 yrars:    30 - 400 ng/mL  Adult females 17 - 60 years:    13 - 150 ng/mL  Adults greater than 60 years:   no established reference range  Pediatrics:                     no established reference range       Iron   Date Value Ref Range Status   11/02/2017 51 37 - 145 mcg/dL Final     TIBC   Date Value Ref Range Status   11/02/2017 266 250 - 450 mcg/dL Final     Vitamin B-12   Date Value Ref Range Status   06/16/2020 1943 (H) 211 - 946 pg/mL Final     Folate   Date Value Ref Range Status   06/16/2020 >20.0 4.8 - 24.2 ng/mL Final     Lab Results   Component Value Date    COLORU Straw 06/15/2021    NITRU Negative 06/15/2021    GLUCOSEU Negative 06/15/2021    GLUCOSEU NEGATIVE 04/25/2011    KETUA Negative 06/15/2021    UROBILINOGEN 0.2 06/15/2021    BILIRUBINUR Negative 06/15/2021    BILIRUBINUR negative 03/03/2020    BILIRUBINUR NEGATIVE 04/25/2011     No results found for: RAZIA, CREURRAN, MACREATRATIO, OSMOU    No components found for: URIC    No results found for: LIPIDPAN    Assessment and Plan:    1. ALEC stage I on CKD 3 a  Likely prerenal in setting of decreased effective renal perfusion given the use of bumex/ spironolactone, with entresto contributing   baseline creatinine from July 2021 was 1.1-1.2  Continue to hold Entresto, spironolactone, and Bumex  Check urine studies  Strict I&O, daily weights  Avoid nephrotoxins/NSAIDs  Monitor labs  Follow    2.   HFpEF  proBNP from 4/11 was 2108  Echo 6/2021 showed EF 55-60%, left ventricular hypertrophy severe  Chest x-ray showed \"bilateral airspace disease\"  Entresto on hold  Bumex 1 mg IV twice daily and spironolactone 25 mg oral daily on hold  Strict I&O, daily weights  Cardiology also following    3. Hypertension  BP goal<130/80  BP nearing goal  Follow on current regimen    4. Type 2 diabetes mellitus  Hemoglobin A1c from 7/2021 was 6.9%  On insulin lispro and insulin glargine  Management per primary    5. Hyperkalemia  likely in setting of ALEC/CKD/Entresto  K+ 5.1 today  Entresto on hold  Low potassium diet  Follow    PATRIZIA Potter - CNP     Patient seen and examined all key components of the physical performed independently , case discussed with NP, all pertinent labs and radiologic tests personally reviewed agree with above.     Acute decompensated HFpEF  Diuresis, monitor renal function  Resume once Cr stabilizes    Asif Collazo MD

## 2022-04-12 NOTE — TELEPHONE ENCOUNTER
Patient is currently hospitalized. She is asking to reschedule post op appointment and wants to know if Dr Sterling Loya goes to hospital at all. Please contact patient to reschedule.

## 2022-04-12 NOTE — PROGRESS NOTES
Progress transfers and ambulation as able. Current Treatment Recommendations:   [] Strengthening to improve independence with functional mobility   [] ROM to improve independence with functional mobility   [x] Balance Training to improve static/dynamic balance and to reduce fall risk  [x] Endurance Training to improve activity tolerance during functional mobility   [x] Transfer Training to improve safety and independence with all functional transfers   [x] Gait Training to improve gait mechanics, endurance and asses need for appropriate assistive device  [] Stair Training in preparation for safe discharge home and/or into the community   [x] Positioning to prevent skin breakdown and contractures  [x] Safety and Education Training   [] Patient/Caregiver Education   [] HEP  [] Other     PT long term treatment goals are located in above grid    Frequency of treatments: 2-5x/week x 1-2 weeks. Time in  1015  Time out  1030    Total Treatment Time  10 minutes     Evaluation Time includes thorough review of current medical information, gathering information on past medical history/social history and prior level of function, completion of standardized testing/informal observation of tasks, assessment of data and education on plan of care and goals.     CPT codes:  [x] Low Complexity PT evaluation 61435  [] Moderate Complexity PT evaluation 73601  [] High Complexity PT evaluation 56992  [] PT Re-evaluation 87323  [] Gait training 43735 - minutes  [] Manual therapy 76734 - minutes  [x] Therapeutic activities 03563 10 minutes  [] Therapeutic exercises 35916 - minutes  [] Neuromuscular reeducation 69880 - minutes     Sonny Dee, PT, DPT  [id number]

## 2022-04-12 NOTE — CONSULTS
//      Inpatient Cardiology Consultation      Reason for Consult:  CHF    Consulting Physician: Dr. Oniel Keller    Requesting Physician:  TONY Ignacio    Date of Consultation: 4/12/2022    HISTORY OF PRESENT ILLNESS:   Ms. Andrew Pierre is a 78year old obese female who is known to Dr. SON [facility]; most recently seen in OP on 9/15/2021 for follow-up CAD and HFimpEF. She was continued on same medications including GDMT (Entresto 49/51 mg BID, Aldactone 25 QD, Coreg 25 mg BID ---> no additional cardiac testing was needed at that time. PMHx: HFimpEF, CAD with history of CABG (2011) x 2, Obesity, HTN, HLD, T2DM, Tobacco and marijuana use, PAD with history of left popiteal and tibial arthrectomy/angioplasty, Chronic RBBB, Hx of COVID-19 pneumonia and COPD. [facility] on 4/11/2022 with complaints of SAUNDERS x 4 days with productive \"thick\" cough (white/green/yellow) with intermittent episodes of chills and feeling \"feverish. \" She reported that over the past 4 days she has felt \"congested on the right side of her chest and over the right side of her back. She reports having \"crackling over the right side of her chest.\"  She reports compliance with her home medications and using Trilogy. She denies LE edema but reports abdominal bloating, early satiety, orthopnea but denies PND. Attempted to use trilogy at home with no relief. EMS was summoned and patient was placed on BiPAP therapy. She reports having lightheadedness over the past few days with positional changes (sitting to standing) without reports of syncope or near syncope. Upon arrival to the ED: /169, heart rate 143, afebrile, 95% on BiPAP. Labs: Sodium 133, potassium 4.9>>5.1 (today), CO2 21, BUN 22, creatinine 1.2>>1.6 (today) --> Baseline (SCr 1.1-1.4), fasting glucose 274, proBNP 2108 (prior proBNP 1543 on 7/2021) . Hs-cTnT 12>>10. Albumin 3.3. WBC 10.1, H/H stable, plt 250. Total chol 129, HDL 32, LDL 74, triglycerides 114.    CXR: Bilateral airspace disease likely related to cardiogenic edema. EKG: See below. ER medications: Bumex 0.5 mg IV x 1, Duoneb, Solu-medrol. Net output documented: -300 cc. Please note: past medical records were reviewed per electronic medical record (EMR) - see detailed reports under Past Medical/ Surgical History. Past Medical History:  (Please note the following information was obtained from consult note on 7/16/2021 when additional information added). 1. Morbid obesity  2. Hypertension  3. Hyperlipidemia. Currently not on a statin but has been intolerant of Lipitor  4. Current tobacco abuse  5. Diabetes which is insulin requiring , diabetic neuropathy   6. Obstructive sleep apnea, states compliance with Cpap  7. Ischemic cardiomyopathy with a CABG in 2011 with a LIMA to the LAD and a saphenous vein graft to the marginal last cardiac catheterization was in April 2015 and the LIMA was unable to be visualized and the saphenous vein graft to the obtuse marginal was patent.   8. 2-D echocardiogram January 4, 2017, EF 35-45% and diffuse hypokinesis of the left ventricle and left atrium is dilatedAortic valve appears mildly sclerotic and there is mild mitral regurgitation  9. NSTEMI: 6/6/2018 (troponin 0.35, 0.32, 0.48) no rise in CK or CK-MB.  Creatinine 1.0.  Per cardiology, probable recent NSTEMI, type II low risk noninvasive 6/5/2018.  Patient was diuresed approximately 10 L --> And discharged home on loop diuretic. 10. Lexiscan MPS: 6/5/2018: Pharmacologically induced perfusion defect involving the lateral wall, without evidence of reversibility, diffusely hypokinetic left ventricular wall motion with a focal area of akinesis involving the mid lateral wall, LVEF 42%. 11. TTE: 6/5/2018: LVEF 65% by biplane, stage II diastolic dysfunction, moderate LVH, no wall motion abnormality, normal RV function, no pericardial effusion.   12. TTE 06/11/2021 (Dr. Preeti Arevalo): Left ventricle size is normal. Ejection fraction is visually estimated at 55-60%. No regional wall motion abnormalities seen. Severe concentric left ventricular hypertrophy. Indeterminate diastolic function. Normal right ventricular size. Right ventricle global systolic function is mildly reduced . TAPSE 15 mm.  No hemodynamically significant aortic stenosis is present. Physiologic and/or trace tricuspid regurgitation. RVSP is 26 mmHg. Normal estimated PA systolic pressure. No evidence for hemodynamically significant pericardial effusion. 13. Chronic RBBB    14. IV dye, Lipitor, Lasix, Norvasc allergies: Unknown reaction per patient  15. C. diff 2015  16. Asthma status post bronchial brush biopsy in January 2013   17. Osteoarthritis. And left knee replacement  18. Hx Liver biopsy. Hepatic encephalopathy  19. S/p Bilateral breast reduction   20. Diverticulosis. 21. Hyperplastic colon polyps and fatty liver  22. Hx Ventral hernia  23. Glaucoma  24. Hx Anemia and blood transfusion  25. Hx Pneumonia  26. Depression  27. S/p Cholecystectomy   28. Patient follows with neurology at [facility] and was diagnosed with chronic pain syndrome and is recommending chronic pain management. 29. History of severe peripheral vascular disease with history of left popliteal and tibial trunk atherectomy on 11/13/2018 was initiated on aspirin and Plavix. 27. COVID-19 infection (April 2021)  31. Hospital admission 7/15/2021-7/21/2021: Admitted for hypoxia /patient was diagnosed with Covid-19 infection 4/2021 and now requiring oxygen. Treated for acute hypoxic respiratory failure secondary to acute COPD exacerbation and acute on chronic HFpEF  32. 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 Medications Prior to admit:  Prior to Admission medications    Medication Sig Start Date End Date Taking?  Authorizing Provider   acetaminophen (TYLENOL) 325 MG tablet Take 650 mg by mouth every 6 hours as needed for Pain   Yes Historical Provider, MD   aspirin 81 MG chewable tablet Take 81 mg by mouth daily   Yes Historical Provider, MD   Arformoterol Tartrate (BROVANA) 15 MCG/2ML NEBU Take 1 ampule by nebulization 2 times daily as needed   Yes Historical Provider, MD   azelastine (ASTELIN) 0.1 % nasal spray 1 spray by Nasal route 2 times daily as needed for Rhinitis   Yes Historical Provider, MD   cetirizine (ZYRTEC) 10 MG tablet Take 10 mg by mouth daily   Yes Historical Provider, MD   DULoxetine (CYMBALTA) 60 MG extended release capsule Take 60 mg by mouth 2 times daily   Yes Historical Provider, MD   sacubitril-valsartan (ENTRESTO) 49-51 MG per tablet Take 1 tablet by mouth 2 times daily   Yes Historical Provider, MD   famotidine (PEPCID) 40 MG tablet Take 40 mg by mouth Daily with supper   Yes Historical Provider, MD   fluticasone (FLONASE) 50 MCG/ACT nasal spray 2 sprays by Nasal route daily   Yes Historical Provider, MD   gabapentin (NEURONTIN) 100 MG capsule Take 100 mg by mouth 2 times daily.    Yes Historical Provider, MD   Insulin Degludec (TRESIBA FLEXTOUCH) 200 UNIT/ML SOPN Inject 50 Units into the skin at bedtime   Yes Historical Provider, MD   insulin lispro, 1 Unit Dial, (HUMALOG KWIKPEN) 100 UNIT/ML SOPN Inject 0-6 Units into the skin 3 times daily (before meals) *Per Sliding Scale*   Yes Historical Provider, MD   ipratropium-albuterol (DUONEB) 0.5-2.5 (3) MG/3ML SOLN nebulizer solution Take 1 vial by nebulization every 4 hours as needed for Shortness of Breath   Yes Historical Provider, MD   isosorbide mononitrate (IMDUR) 60 MG extended release tablet Take 60 mg by mouth daily   Yes Historical Provider, MD   montelukast (SINGULAIR) 10 MG tablet Take 10 mg by mouth nightly   Yes Historical Provider, MD   mirabegron (MYRBETRIQ) 50 MG TB24 Take 50 mg by mouth at bedtime   Yes Historical Provider, MD   simvastatin (ZOCOR) 20 MG tablet Take 20 mg by mouth nightly   Yes Historical Provider, MD   tiotropium (SPIRIVA RESPIMAT) 1.25 MCG/ACT AERS inhaler Inhale 2 puffs into the lungs daily   Yes Historical Provider, MD   traZODone (DESYREL) 100 MG tablet Take 100 mg by mouth nightly   Yes Historical Provider, MD   vitamin D (CHOLECALCIFEROL) 25 MCG (1000 UT) TABS tablet Take 1,000 Units by mouth daily   Yes Historical Provider, MD   bumetanide (BUMEX) 1 MG tablet Take 1 tablet by mouth daily 10/25/21   Sebas Jean MD   spironolactone (ALDACTONE) 25 MG tablet Take 1 tablet by mouth daily 9/15/21   Sebas Jean MD   carvedilol (COREG) 25 MG tablet Take 1 tablet by mouth 2 times daily (with meals) 9/15/21   Sebas Jean MD   hydrALAZINE (APRESOLINE) 50 MG tablet Take 50 mg by mouth 3 times daily    Historical Provider, MD   pantoprazole (PROTONIX) 40 MG tablet Take 40 mg by mouth daily  6/21/21   Historical Provider, MD   metoclopramide (REGLAN) 5 MG tablet Take 5 mg by mouth 3 times daily as needed  5/5/21   Cristiane Hayes DO   docusate sodium (COLACE) 100 MG capsule Take 200 mg by mouth at bedtime  3/11/21   Historical Provider, MD       Current Medications:    Current Facility-Administered Medications: insulin glargine-yfgn (SEMGLEE-YFGN) injection vial 25 Units, 25 Units, SubCUTAneous, Nightly  labetalol (NORMODYNE;TRANDATE) injection 10 mg, 10 mg, IntraVENous, Once  Arformoterol Tartrate (BROVANA) nebulizer solution 15 mcg, 15 mcg, Nebulization, BID PRN  aspirin chewable tablet 81 mg, 81 mg, Oral, Daily  carvedilol (COREG) tablet 25 mg, 25 mg, Oral, BID WC  cetirizine (ZYRTEC) tablet 10 mg, 10 mg, Oral, Daily  DULoxetine (CYMBALTA) extended release capsule 60 mg, 60 mg, Oral, BID  famotidine (PEPCID) tablet 20 mg, 20 mg, Oral, Dinner  fluticasone (FLONASE) 50 MCG/ACT nasal spray 2 spray, 2 spray, Nasal, Daily  gabapentin (NEURONTIN) capsule 100 mg, 100 mg, Oral, BID  hydrALAZINE (APRESOLINE) tablet 50 mg, 50 mg, Oral, TID  ipratropium-albuterol (DUONEB) nebulizer solution 3 mL, 1 vial, Nebulization, Q4H WA  isosorbide mononitrate (IMDUR) extended release tablet 60 mg, 60 mg, Oral, Daily  mirabegron (MYRBETRIQ) extended release tablet 50 mg, 50 mg, Oral, Nightly  montelukast (SINGULAIR) tablet 10 mg, 10 mg, Oral, Nightly  pantoprazole (PROTONIX) tablet 40 mg, 40 mg, Oral, Daily  [Held by provider] sacubitril-valsartan (ENTRESTO) 49-51 MG per tablet 1 tablet, 1 tablet, Oral, BID  atorvastatin (LIPITOR) tablet 10 mg, 10 mg, Oral, Nightly  [Held by provider] spironolactone (ALDACTONE) tablet 25 mg, 25 mg, Oral, Daily  traZODone (DESYREL) tablet 100 mg, 100 mg, Oral, Nightly  sodium chloride flush 0.9 % injection 5-40 mL, 5-40 mL, IntraVENous, 2 times per day  sodium chloride flush 0.9 % injection 5-40 mL, 5-40 mL, IntraVENous, PRN  0.9 % sodium chloride infusion, , IntraVENous, PRN  polyethylene glycol (GLYCOLAX) packet 17 g, 17 g, Oral, Daily PRN  acetaminophen (TYLENOL) tablet 650 mg, 650 mg, Oral, Q6H PRN **OR** acetaminophen (TYLENOL) suppository 650 mg, 650 mg, Rectal, Q6H PRN  enoxaparin (LOVENOX) injection 40 mg, 40 mg, SubCUTAneous, Daily  insulin lispro (HUMALOG) injection vial 0-12 Units, 0-12 Units, SubCUTAneous, TID WC  insulin lispro (HUMALOG) injection vial 0-6 Units, 0-6 Units, SubCUTAneous, Nightly  glucose (GLUTOSE) 40 % oral gel 15 g, 15 g, Oral, PRN  dextrose 50 % IV solution, 12.5 g, IntraVENous, PRN  glucagon (rDNA) injection 1 mg, 1 mg, IntraMUSCular, PRN  dextrose 5 % solution, 100 mL/hr, IntraVENous, PRN  magnesium sulfate 2000 mg in 50 mL IVPB premix, 2,000 mg, IntraVENous, PRN  potassium chloride (KLOR-CON M) extended release tablet 40 mEq, 40 mEq, Oral, PRN **OR** potassium bicarb-citric acid (EFFER-K) effervescent tablet 40 mEq, 40 mEq, Oral, PRN **OR** potassium chloride 10 mEq/100 mL IVPB (Peripheral Line), 10 mEq, IntraVENous, PRN  bumetanide (BUMEX) injection 1 mg, 1 mg, IntraVENous, BID  hydrALAZINE (APRESOLINE) injection 10 mg, 10 mg, IntraVENous, Q4H PRN    Allergies:  Latex, Bee venom, Dilaudid [hydromorphone hcl], Dye [iodides], Percocet [oxycodone-acetaminophen], Keflex [cephalexin], Lasix [furosemide], Levaquin [levofloxacin in d5w], Lipitor, Lyrica [pregabalin], Morphine, Naproxen, Nefazodone, Norvasc [amlodipine], Oxycodone-acetaminophen, Shellfish-derived products, and Trazodone and nefazodone    Social History:    Current smoker: 0.25 PPD x 40 years   Denies alcohol  Marijuana use: 1-2 times per month. Resides alone but has family near by to assist with getting food and taking her to her appointments. Family History: Noncontributory due to advanced age. REVIEW OF SYSTEMS:     · Constitutional: + fatigue, +fevers +chills. Denies  night sweats  · Eyes: Denies visual changes or drainage  · ENT: Denies headaches or hearing loss. No mouth sores or sore throat. No epistaxis   · Cardiovascular: See HPI. · Respiratory: +SAUNDERS, + cough, +orthopnea. Denies PND. No hemoptysis   · Gastrointestinal: Denies hematemesis or anorexia. No hematochezia or melena    · Genitourinary: Denies urgency, dysuria or hematuria. · Musculoskeletal: Denies gait disturbance, weakness or joint complaints  · Integumentary: Denies rash, hives or pruritis   · Neurological: Denies dizziness, headaches or seizures. No numbness or tingling  · Psychiatric: Denies anxiety or depression. · Endocrine: Denies temperature intolerance. No recent weight change. .  · Hematologic/Lymphatic: Denies abnormal bruising or bleeding. No swollen lymph nodes    PHYSICAL EXAM:   /66   Pulse 61   Temp 97 °F (36.1 °C) (Temporal)   Resp 20   Ht 5' 2\" (1.575 m)   Wt 211 lb (95.7 kg)   LMP 01/01/1990   SpO2 97%   BMI 38.59 kg/m²   CONST:  Well developed, well nourished middle aged AA female who appears of stated age. Awake, alert and cooperative. No apparent distress. Dyspenic during conversation.    HEENT:   Head- Normocephalic, atraumatic   Eyes- Conjunctivae pink, anicteric  Throat- Oral mucosa pink and moist  Neck-  Unable to assess   CHEST: Chest symmetrical and non-tender to palpation. No accessory muscle use or intercostal retractions  RESPIRATORY: Lung sounds - Coarse rhonchi with expiratory wheeze. On Supplemental O2.   CARDIOVASCULAR:     Heart Inspection- shows no noted pulsations  Heart Palpation- no heaves or thrills; PMI is non-displaced   Heart Ausculation- Regular rate and rhythm, no murmur. No s3, s4 or rub   PV: No lower extremity edema. No varicosities. Pedal pulses palpable, no clubbing or cyanosis   ABDOMEN: Soft, obese,  non-tender to light palpation. Bowel sounds present. No palpable masses no organomegaly; no abdominal bruit  MS: Good muscle strength and tone. No atrophy or abnormal movements. : Deferred  SKIN: Warm and dry no statis dermatitis or ulcers   NEURO / PSYCH: Oriented to person, place and time. Speech clear and appropriate. Follows all commands. Flat affect. DATA:    EC2022 NSR, cRBBB, rate 94 bpm.  Tele strips: SR, HR 60 bpm.   Diagnostic:      Intake/Output Summary (Last 24 hours) at 2022 0759  Last data filed at 2022 0340  Gross per 24 hour   Intake --   Output 300 ml   Net -300 ml       Labs:   CBC:   Recent Labs     22  1004 22  0541   WBC 10.1 8.1   HGB 14.7 12.0   HCT 46.1 38.4    219     BMP:   Recent Labs     22  1122 22  0541    135   K 4.9 5.1*   CO2 21* 27   BUN 22 33*   CREATININE 1.2* 1.6*   LABGLOM 54 39   CALCIUM 8.2* 8.5*     Mag:   Recent Labs     22  0541   MG 2.3     Phos: No results for input(s): PHOS in the last 72 hours.   TFT:   Lab Results   Component Value Date    TSH 0.646 2021    A9JMFAC 7.9 2017    T4FREE 1.34 2016      HgA1c:   Lab Results   Component Value Date    LABA1C 6.9 (H) 2021     No results found for: EAG  proBNP:   Recent Labs     22  1004   PROBNP 2,108*     CARDIAC ENZYMES:  Recent Labs     22  1122 22  2133   TROPHS 12* 10*     FASTING LIPID PANEL:  Lab Results   Component Value Date    CHOL 129 2022 HDL 32 04/12/2022    LDLCALC 74 04/12/2022    TRIG 114 04/12/2022     LIVER PROFILE:  Recent Labs     04/11/22  1122   AST 18   ALT 15   LABALBU 3.3*     CXR: 4/11/2022:  Impression   Bilateral airspace disease likely related to cardiogenic edema.  See above. Assessment/Plan:   1. Acute hypoxic respiratory failure: On supplemental oxygen. History of chronic bronchitis secondary to tobacco use and COPD. 2. Acute on chronic heart failure with improved ejection fraction  · LVEF 35-45% on TTE 1/2017, LVEF 65% on TTE 6/5/2018, LVEF 35-60% on TTE 6/11/2021.  3. ACC stage C/NYHA class II-III  4. Mildly Hypervolemic   5. History of CAD status post CABG in 2011 (LIMA-LAD, SVG-OM1). Lexiscan MPS (6/5/2018 showing perfusion defect involving the lateral wall without evidence of reversibility, diffusely hypokinetic left ventricular wall motion with a focal area of akinesis involving the mid lateral wall. 6. Borderline elevated hs-cTnT (12>>10): pattern not consistent with ACS. CP free with no EKG changes. 7. Obesity: BMI 38.59 kg/m2  8. DAYAN: On trilogy (unable to tolerate CPAP)  9. Hypertension/LVH: /66 - 132/59   10. Sinus bradycardia   11. Hyperlipidemia: intolerant to Lipitor / Tolerates Zocor 20 mg QD. 12. Type 2 diabetes mellitus  13. Tobacco and marijuana use  14. PAD status post left popliteal and tibial atherectomy/angioplasty  15. Chronic right bundle branch block  16. History of Covid-19 infection (4/2021)  17. Hyperkalemia     · Recommend IV Bumex x 1 dose today and reassess in am.   · Monitor renal function, daily weights and strict I/O's  · Low-salt diet  · Consider pulmonary consult for treatment of COPD  · Consider renal consultation with rise in SCr from baseline (scheduled to establish care with Dr. Paola Tapia in 2 months). · Agree with holding Entresto and Aldactone in the setting of worsening renal function and hyperkalemia.    · Continue aspirin, Simvastatin, Hydralazine, Imdur and Coreg (with parameters)  · Tobacco and Marijuana cessation. · Aggressive risk factor modification   · Compliance with Trilogy   · Will follow. The above assessment/Plan as per Dr. Daisy Sanchez. Electronically signed by PATRIZIA Martínez CNP on 4/12/22 at 11:45 AM EDT    Patient seen and examined and case discussed in detail with cardiology nurse practitioner and agree with assessment and plan and history physical examination discussed in detail and documented above. I individually saw the patient and examined the patient ,reviewed the chart and perform history and physical examination and discussed in detail with the cardiology nurse practitioner and contributed more than 50% of the work.

## 2022-04-12 NOTE — PROGRESS NOTES
Pharmacy Note    [facility] Per the [address], this medication is non-formulary and not stocked by pharmacy for the reason indicated below. The medication can be reordered at discharge.      Medications in which risks outweigh benefits during hospitalization:         -  oral bisphosphonates         -  raloxifene (Evista)      Medications that lack necessity during an acute hospital stay:      -  nasal antihistamines        -  nasal ipratropium 0.03% and 0.06%        -  nasal miacalcin        -  acyclovir topical cream/ointment orders for herpes labialis (cold sores)    Ed Latif, [facility]

## 2022-04-13 LAB
ANION GAP SERPL CALCULATED.3IONS-SCNC: 11 MMOL/L (ref 7–16)
BASOPHILS ABSOLUTE: 0.01 E9/L (ref 0–0.2)
BASOPHILS RELATIVE PERCENT: 0.1 % (ref 0–2)
BUN BLDV-MCNC: 38 MG/DL (ref 6–23)
CALCIUM SERPL-MCNC: 8.4 MG/DL (ref 8.6–10.2)
CHLORIDE BLD-SCNC: 97 MMOL/L (ref 98–107)
CO2: 25 MMOL/L (ref 22–29)
CREAT SERPL-MCNC: 1.6 MG/DL (ref 0.5–1)
CREATININE URINE: 76 MG/DL (ref 29–226)
EOSINOPHILS ABSOLUTE: 0.01 E9/L (ref 0.05–0.5)
EOSINOPHILS RELATIVE PERCENT: 0.1 % (ref 0–6)
GFR AFRICAN AMERICAN: 39
GFR NON-AFRICAN AMERICAN: 39 ML/MIN/1.73
GLUCOSE BLD-MCNC: 314 MG/DL (ref 74–99)
HCT VFR BLD CALC: 38.2 % (ref 34–48)
HEMOGLOBIN: 12 G/DL (ref 11.5–15.5)
IMMATURE GRANULOCYTES #: 0.02 E9/L
IMMATURE GRANULOCYTES %: 0.3 % (ref 0–5)
LV EF: 53 %
LVEF MODALITY: NORMAL
LYMPHOCYTES ABSOLUTE: 0.49 E9/L (ref 1.5–4)
LYMPHOCYTES RELATIVE PERCENT: 6.5 % (ref 20–42)
MAGNESIUM: 2.2 MG/DL (ref 1.6–2.6)
MCH RBC QN AUTO: 31.8 PG (ref 26–35)
MCHC RBC AUTO-ENTMCNC: 31.4 % (ref 32–34.5)
MCV RBC AUTO: 101.3 FL (ref 80–99.9)
METER GLUCOSE: 160 MG/DL (ref 74–99)
METER GLUCOSE: 268 MG/DL (ref 74–99)
METER GLUCOSE: 352 MG/DL (ref 74–99)
MONOCYTES ABSOLUTE: 0.35 E9/L (ref 0.1–0.95)
MONOCYTES RELATIVE PERCENT: 4.6 % (ref 2–12)
NEUTROPHILS ABSOLUTE: 6.71 E9/L (ref 1.8–7.3)
NEUTROPHILS RELATIVE PERCENT: 88.4 % (ref 43–80)
PDW BLD-RTO: 11.8 FL (ref 11.5–15)
PLATELET # BLD: 205 E9/L (ref 130–450)
PMV BLD AUTO: 10.6 FL (ref 7–12)
POTASSIUM SERPL-SCNC: 5.1 MMOL/L (ref 3.5–5)
RBC # BLD: 3.77 E12/L (ref 3.5–5.5)
RBC # BLD: NORMAL 10*6/UL
SODIUM BLD-SCNC: 133 MMOL/L (ref 132–146)
WBC # BLD: 7.6 E9/L (ref 4.5–11.5)

## 2022-04-13 PROCEDURE — 6370000000 HC RX 637 (ALT 250 FOR IP)

## 2022-04-13 PROCEDURE — 6370000000 HC RX 637 (ALT 250 FOR IP): Performed by: INTERNAL MEDICINE

## 2022-04-13 PROCEDURE — 2580000003 HC RX 258: Performed by: PHYSICIAN ASSISTANT

## 2022-04-13 PROCEDURE — S5553 INSULIN LONG ACTING 5 U: HCPCS | Performed by: INTERNAL MEDICINE

## 2022-04-13 PROCEDURE — 93306 TTE W/DOPPLER COMPLETE: CPT

## 2022-04-13 PROCEDURE — 6370000000 HC RX 637 (ALT 250 FOR IP): Performed by: PHYSICIAN ASSISTANT

## 2022-04-13 PROCEDURE — 82962 GLUCOSE BLOOD TEST: CPT

## 2022-04-13 PROCEDURE — 6360000002 HC RX W HCPCS: Performed by: PHYSICIAN ASSISTANT

## 2022-04-13 PROCEDURE — 85025 COMPLETE CBC W/AUTO DIFF WBC: CPT

## 2022-04-13 PROCEDURE — 99233 SBSQ HOSP IP/OBS HIGH 50: CPT | Performed by: INTERNAL MEDICINE

## 2022-04-13 PROCEDURE — 2140000000 HC CCU INTERMEDIATE R&B

## 2022-04-13 PROCEDURE — 6370000000 HC RX 637 (ALT 250 FOR IP): Performed by: NURSE PRACTITIONER

## 2022-04-13 PROCEDURE — 94660 CPAP INITIATION&MGMT: CPT

## 2022-04-13 PROCEDURE — 36415 COLL VENOUS BLD VENIPUNCTURE: CPT

## 2022-04-13 PROCEDURE — 80048 BASIC METABOLIC PNL TOTAL CA: CPT

## 2022-04-13 PROCEDURE — 2700000000 HC OXYGEN THERAPY PER DAY

## 2022-04-13 PROCEDURE — 94640 AIRWAY INHALATION TREATMENT: CPT

## 2022-04-13 PROCEDURE — 83735 ASSAY OF MAGNESIUM: CPT

## 2022-04-13 RX ORDER — GUAIFENESIN 100 MG/5ML
200 SOLUTION ORAL EVERY 6 HOURS PRN
Status: DISCONTINUED | OUTPATIENT
Start: 2022-04-13 | End: 2022-04-19 | Stop reason: HOSPADM

## 2022-04-13 RX ORDER — BUMETANIDE 1 MG/1
2 TABLET ORAL DAILY
Status: DISCONTINUED | OUTPATIENT
Start: 2022-04-13 | End: 2022-04-19 | Stop reason: HOSPADM

## 2022-04-13 RX ORDER — OXYBUTYNIN CHLORIDE 5 MG/1
5 TABLET ORAL 2 TIMES DAILY
Status: DISCONTINUED | OUTPATIENT
Start: 2022-04-13 | End: 2022-04-19 | Stop reason: HOSPADM

## 2022-04-13 RX ADMIN — IPRATROPIUM BROMIDE AND ALBUTEROL SULFATE 3 ML: .5; 2.5 SOLUTION RESPIRATORY (INHALATION) at 20:38

## 2022-04-13 RX ADMIN — SODIUM ZIRCONIUM CYCLOSILICATE 10 G: 10 POWDER, FOR SUSPENSION ORAL at 13:51

## 2022-04-13 RX ADMIN — DULOXETINE HYDROCHLORIDE 60 MG: 60 CAPSULE, DELAYED RELEASE ORAL at 21:24

## 2022-04-13 RX ADMIN — INSULIN LISPRO 3 UNITS: 100 INJECTION, SOLUTION INTRAVENOUS; SUBCUTANEOUS at 21:24

## 2022-04-13 RX ADMIN — FAMOTIDINE 20 MG: 20 TABLET ORAL at 16:52

## 2022-04-13 RX ADMIN — ASPIRIN 81 MG 81 MG: 81 TABLET ORAL at 09:42

## 2022-04-13 RX ADMIN — IPRATROPIUM BROMIDE AND ALBUTEROL SULFATE 3 ML: .5; 2.5 SOLUTION RESPIRATORY (INHALATION) at 12:26

## 2022-04-13 RX ADMIN — FLUTICASONE PROPIONATE 2 SPRAY: 50 SPRAY, METERED NASAL at 09:42

## 2022-04-13 RX ADMIN — TRAZODONE HYDROCHLORIDE 100 MG: 50 TABLET ORAL at 21:23

## 2022-04-13 RX ADMIN — INSULIN LISPRO 8 UNITS: 100 INJECTION, SOLUTION INTRAVENOUS; SUBCUTANEOUS at 09:43

## 2022-04-13 RX ADMIN — INSULIN GLARGINE-YFGN 25 UNITS: 100 INJECTION, SOLUTION SUBCUTANEOUS at 21:24

## 2022-04-13 RX ADMIN — OXYBUTYNIN CHLORIDE 5 MG: 5 TABLET ORAL at 21:24

## 2022-04-13 RX ADMIN — PANTOPRAZOLE SODIUM 40 MG: 40 TABLET, DELAYED RELEASE ORAL at 09:40

## 2022-04-13 RX ADMIN — HYDRALAZINE HYDROCHLORIDE 50 MG: 25 TABLET, FILM COATED ORAL at 13:51

## 2022-04-13 RX ADMIN — BUMETANIDE 2 MG: 1 TABLET ORAL at 16:52

## 2022-04-13 RX ADMIN — INSULIN LISPRO 6 UNITS: 100 INJECTION, SOLUTION INTRAVENOUS; SUBCUTANEOUS at 17:02

## 2022-04-13 RX ADMIN — PREDNISONE 40 MG: 20 TABLET ORAL at 09:40

## 2022-04-13 RX ADMIN — INSULIN LISPRO 2 UNITS: 100 INJECTION, SOLUTION INTRAVENOUS; SUBCUTANEOUS at 13:52

## 2022-04-13 RX ADMIN — ATORVASTATIN CALCIUM 10 MG: 10 TABLET, FILM COATED ORAL at 21:24

## 2022-04-13 RX ADMIN — IPRATROPIUM BROMIDE AND ALBUTEROL SULFATE 3 ML: .5; 2.5 SOLUTION RESPIRATORY (INHALATION) at 16:47

## 2022-04-13 RX ADMIN — DOXYCYCLINE HYCLATE 100 MG: 100 CAPSULE ORAL at 09:40

## 2022-04-13 RX ADMIN — GABAPENTIN 100 MG: 100 CAPSULE ORAL at 09:42

## 2022-04-13 RX ADMIN — IPRATROPIUM BROMIDE AND ALBUTEROL SULFATE 3 ML: .5; 2.5 SOLUTION RESPIRATORY (INHALATION) at 08:52

## 2022-04-13 RX ADMIN — HYDROCODONE BITARTRATE AND ACETAMINOPHEN 1 TABLET: 5; 325 TABLET ORAL at 21:23

## 2022-04-13 RX ADMIN — CARVEDILOL 25 MG: 25 TABLET, FILM COATED ORAL at 16:52

## 2022-04-13 RX ADMIN — MONTELUKAST SODIUM 10 MG: 10 TABLET ORAL at 21:23

## 2022-04-13 RX ADMIN — DOXYCYCLINE HYCLATE 100 MG: 100 CAPSULE ORAL at 21:24

## 2022-04-13 RX ADMIN — HYDROCODONE BITARTRATE AND ACETAMINOPHEN 1 TABLET: 5; 325 TABLET ORAL at 10:08

## 2022-04-13 RX ADMIN — CETIRIZINE HYDROCHLORIDE 10 MG: 10 TABLET, FILM COATED ORAL at 09:42

## 2022-04-13 RX ADMIN — DULOXETINE HYDROCHLORIDE 60 MG: 60 CAPSULE, DELAYED RELEASE ORAL at 09:40

## 2022-04-13 RX ADMIN — GABAPENTIN 100 MG: 100 CAPSULE ORAL at 21:23

## 2022-04-13 RX ADMIN — SODIUM CHLORIDE, PRESERVATIVE FREE 10 ML: 5 INJECTION INTRAVENOUS at 09:42

## 2022-04-13 RX ADMIN — OXYBUTYNIN CHLORIDE 5 MG: 5 TABLET ORAL at 16:52

## 2022-04-13 RX ADMIN — HYDROCODONE BITARTRATE AND ACETAMINOPHEN 1 TABLET: 5; 325 TABLET ORAL at 00:15

## 2022-04-13 RX ADMIN — SODIUM CHLORIDE, PRESERVATIVE FREE 10 ML: 5 INJECTION INTRAVENOUS at 21:22

## 2022-04-13 RX ADMIN — GUAIFENESIN 200 MG: 200 SOLUTION ORAL at 21:23

## 2022-04-13 RX ADMIN — HYDRALAZINE HYDROCHLORIDE 50 MG: 25 TABLET, FILM COATED ORAL at 21:23

## 2022-04-13 RX ADMIN — ENOXAPARIN SODIUM 40 MG: 100 INJECTION SUBCUTANEOUS at 09:42

## 2022-04-13 ASSESSMENT — PAIN DESCRIPTION - LOCATION
LOCATION: BACK

## 2022-04-13 ASSESSMENT — PAIN DESCRIPTION - PAIN TYPE
TYPE: CHRONIC PAIN

## 2022-04-13 ASSESSMENT — PAIN SCALES - GENERAL
PAINLEVEL_OUTOF10: 0
PAINLEVEL_OUTOF10: 8
PAINLEVEL_OUTOF10: 5
PAINLEVEL_OUTOF10: 0
PAINLEVEL_OUTOF10: 10
PAINLEVEL_OUTOF10: 5
PAINLEVEL_OUTOF10: 0

## 2022-04-13 ASSESSMENT — PAIN DESCRIPTION - ORIENTATION
ORIENTATION: RIGHT

## 2022-04-13 ASSESSMENT — PAIN DESCRIPTION - FREQUENCY
FREQUENCY: CONTINUOUS
FREQUENCY: CONTINUOUS

## 2022-04-13 ASSESSMENT — PAIN DESCRIPTION - ONSET: ONSET: ON-GOING

## 2022-04-13 ASSESSMENT — PAIN DESCRIPTION - PROGRESSION
CLINICAL_PROGRESSION: NOT CHANGED
CLINICAL_PROGRESSION: NOT CHANGED

## 2022-04-13 ASSESSMENT — PAIN DESCRIPTION - DESCRIPTORS
DESCRIPTORS: ACHING;CONSTANT;DISCOMFORT
DESCRIPTORS: ACHING;CONSTANT;SORE

## 2022-04-13 NOTE — PROGRESS NOTES
//      Inpatient Cardiology Consultation follow-up visit    Reason for Consult:  CHF    Consulting Physician: Dr. Allison Christensen    Requesting Physician:  TONY Chan    Date of Consultation: 4/13/2022    Subjective: Reports feeling better today. Is undergoing echocardiogram today. Past Medical History:  (Please note the following information was obtained from consult note on 7/16/2021 when additional information added). 1. Morbid obesity  2. Hypertension  3. Hyperlipidemia. Currently not on a statin but has been intolerant of Lipitor  4. Current tobacco abuse  5. Diabetes which is insulin requiring , diabetic neuropathy   6. Obstructive sleep apnea, states compliance with Cpap  7. Ischemic cardiomyopathy with a CABG in 2011 with a LIMA to the LAD and a saphenous vein graft to the marginal last cardiac catheterization was in April 2015 and the LIMA was unable to be visualized and the saphenous vein graft to the obtuse marginal was patent.   8. 2-D echocardiogram January 4, 2017, EF 35-45% and diffuse hypokinesis of the left ventricle and left atrium is dilatedAortic valve appears mildly sclerotic and there is mild mitral regurgitation  9. NSTEMI: 6/6/2018 (troponin 0.35, 0.32, 0.48) no rise in CK or CK-MB.  Creatinine 1.0.  Per cardiology, probable recent NSTEMI, type II low risk noninvasive 6/5/2018.  Patient was diuresed approximately 10 L --> And discharged home on loop diuretic. 10. Lexiscan MPS: 6/5/2018: Pharmacologically induced perfusion defect involving the lateral wall, without evidence of reversibility, diffusely hypokinetic left ventricular wall motion with a focal area of akinesis involving the mid lateral wall, LVEF 42%. 11. TTE: 6/5/2018: LVEF 65% by biplane, stage II diastolic dysfunction, moderate LVH, no wall motion abnormality, normal RV function, no pericardial effusion. 12. TTE 06/11/2021 (Dr. Geo Galvez): Left ventricle size is normal. Ejection fraction is visually estimated at 55-60%.  No tablet Take 81 mg by mouth daily   Yes Historical Provider, MD   Arformoterol Tartrate (BROVANA) 15 MCG/2ML NEBU Take 1 ampule by nebulization 2 times daily as needed   Yes Historical Provider, MD   azelastine (ASTELIN) 0.1 % nasal spray 1 spray by Nasal route 2 times daily as needed for Rhinitis   Yes Historical Provider, MD   cetirizine (ZYRTEC) 10 MG tablet Take 10 mg by mouth daily   Yes Historical Provider, MD   DULoxetine (CYMBALTA) 60 MG extended release capsule Take 60 mg by mouth 2 times daily   Yes Historical Provider, MD   sacubitril-valsartan (ENTRESTO) 49-51 MG per tablet Take 1 tablet by mouth 2 times daily   Yes Historical Provider, MD   famotidine (PEPCID) 40 MG tablet Take 40 mg by mouth Daily with supper   Yes Historical Provider, MD   fluticasone (FLONASE) 50 MCG/ACT nasal spray 2 sprays by Nasal route daily   Yes Historical Provider, MD   gabapentin (NEURONTIN) 100 MG capsule Take 100 mg by mouth 2 times daily.    Yes Historical Provider, MD   Insulin Degludec (TRESIBA FLEXTOUCH) 200 UNIT/ML SOPN Inject 50 Units into the skin at bedtime   Yes Historical Provider, MD   insulin lispro, 1 Unit Dial, (HUMALOG KWIKPEN) 100 UNIT/ML SOPN Inject 0-6 Units into the skin 3 times daily (before meals) *Per Sliding Scale*   Yes Historical Provider, MD   ipratropium-albuterol (DUONEB) 0.5-2.5 (3) MG/3ML SOLN nebulizer solution Take 1 vial by nebulization every 4 hours as needed for Shortness of Breath   Yes Historical Provider, MD   isosorbide mononitrate (IMDUR) 60 MG extended release tablet Take 60 mg by mouth daily   Yes Historical Provider, MD   montelukast (SINGULAIR) 10 MG tablet Take 10 mg by mouth nightly   Yes Historical Provider, MD   mirabegron (MYRBETRIQ) 50 MG TB24 Take 50 mg by mouth at bedtime   Yes Historical Provider, MD   simvastatin (ZOCOR) 20 MG tablet Take 20 mg by mouth nightly   Yes Historical Provider, MD   tiotropium (SPIRIVA RESPIMAT) 1.25 MCG/ACT AERS inhaler Inhale 2 puffs into the lungs daily   Yes Historical Provider, MD   traZODone (DESYREL) 100 MG tablet Take 100 mg by mouth nightly   Yes Historical Provider, MD   vitamin D (CHOLECALCIFEROL) 25 MCG (1000 UT) TABS tablet Take 1,000 Units by mouth daily   Yes Historical Provider, MD   bumetanide (BUMEX) 1 MG tablet Take 1 tablet by mouth daily 10/25/21   Lydia Tejada MD   spironolactone (ALDACTONE) 25 MG tablet Take 1 tablet by mouth daily 9/15/21   Lydia Tejada MD   carvedilol (COREG) 25 MG tablet Take 1 tablet by mouth 2 times daily (with meals) 9/15/21   Lydia Tejada MD   hydrALAZINE (APRESOLINE) 50 MG tablet Take 50 mg by mouth 3 times daily    Historical Provider, MD   pantoprazole (PROTONIX) 40 MG tablet Take 40 mg by mouth daily  6/21/21   Historical Provider, MD   metoclopramide (REGLAN) 5 MG tablet Take 5 mg by mouth 3 times daily as needed  5/5/21   Karen Hayes DO   docusate sodium (COLACE) 100 MG capsule Take 200 mg by mouth at bedtime  3/11/21   Historical Provider, MD       Current Medications:    Current Facility-Administered Medications: bumetanide (BUMEX) tablet 2 mg, 2 mg, Oral, Daily  oxybutynin (DITROPAN) tablet 5 mg, 5 mg, Oral, BID  insulin glargine-yfgn (SEMGLEE-YFGN) injection vial 25 Units, 25 Units, SubCUTAneous, Nightly  lidocaine 4 % external patch 1 patch, 1 patch, TransDERmal, Daily  predniSONE (DELTASONE) tablet 40 mg, 40 mg, Oral, Daily  doxycycline hyclate (VIBRAMYCIN) capsule 100 mg, 100 mg, Oral, 2 times per day  HYDROcodone-acetaminophen (NORCO) 5-325 MG per tablet 1 tablet, 1 tablet, Oral, Q6H PRN  nicotine (NICODERM CQ) 14 MG/24HR 1 patch, 1 patch, TransDERmal, Daily  perflutren lipid microspheres (DEFINITY) injection 1.65 mg, 1.5 mL, IntraVENous, ONCE PRN  labetalol (NORMODYNE;TRANDATE) injection 10 mg, 10 mg, IntraVENous, Once  Arformoterol Tartrate (BROVANA) nebulizer solution 15 mcg, 15 mcg, Nebulization, BID PRN  aspirin chewable tablet 81 mg, 81 mg, Oral, Daily  carvedilol (COREG) tablet 25 mg, 25 mg, Oral, BID WC  cetirizine (ZYRTEC) tablet 10 mg, 10 mg, Oral, Daily  DULoxetine (CYMBALTA) extended release capsule 60 mg, 60 mg, Oral, BID  famotidine (PEPCID) tablet 20 mg, 20 mg, Oral, Dinner  fluticasone (FLONASE) 50 MCG/ACT nasal spray 2 spray, 2 spray, Nasal, Daily  gabapentin (NEURONTIN) capsule 100 mg, 100 mg, Oral, BID  hydrALAZINE (APRESOLINE) tablet 50 mg, 50 mg, Oral, TID  ipratropium-albuterol (DUONEB) nebulizer solution 3 mL, 1 vial, Nebulization, Q4H WA  isosorbide mononitrate (IMDUR) extended release tablet 60 mg, 60 mg, Oral, Daily  mirabegron (MYRBETRIQ) extended release tablet 50 mg, 50 mg, Oral, Nightly  montelukast (SINGULAIR) tablet 10 mg, 10 mg, Oral, Nightly  pantoprazole (PROTONIX) tablet 40 mg, 40 mg, Oral, Daily  [Held by provider] sacubitril-valsartan (ENTRESTO) 49-51 MG per tablet 1 tablet, 1 tablet, Oral, BID  atorvastatin (LIPITOR) tablet 10 mg, 10 mg, Oral, Nightly  [Held by provider] spironolactone (ALDACTONE) tablet 25 mg, 25 mg, Oral, Daily  traZODone (DESYREL) tablet 100 mg, 100 mg, Oral, Nightly  sodium chloride flush 0.9 % injection 5-40 mL, 5-40 mL, IntraVENous, 2 times per day  sodium chloride flush 0.9 % injection 5-40 mL, 5-40 mL, IntraVENous, PRN  0.9 % sodium chloride infusion, , IntraVENous, PRN  polyethylene glycol (GLYCOLAX) packet 17 g, 17 g, Oral, Daily PRN  acetaminophen (TYLENOL) tablet 650 mg, 650 mg, Oral, Q6H PRN **OR** acetaminophen (TYLENOL) suppository 650 mg, 650 mg, Rectal, Q6H PRN  enoxaparin (LOVENOX) injection 40 mg, 40 mg, SubCUTAneous, Daily  insulin lispro (HUMALOG) injection vial 0-12 Units, 0-12 Units, SubCUTAneous, TID WC  insulin lispro (HUMALOG) injection vial 0-6 Units, 0-6 Units, SubCUTAneous, Nightly  glucose (GLUTOSE) 40 % oral gel 15 g, 15 g, Oral, PRN  dextrose 50 % IV solution, 12.5 g, IntraVENous, PRN  glucagon (rDNA) injection 1 mg, 1 mg, IntraMUSCular, PRN  dextrose 5 % solution, 100 mL/hr, IntraVENous, PRN  magnesium sulfate 2000 mg in 50 mL IVPB premix, 2,000 mg, IntraVENous, PRN  potassium chloride (KLOR-CON M) extended release tablet 40 mEq, 40 mEq, Oral, PRN **OR** potassium bicarb-citric acid (EFFER-K) effervescent tablet 40 mEq, 40 mEq, Oral, PRN **OR** potassium chloride 10 mEq/100 mL IVPB (Peripheral Line), 10 mEq, IntraVENous, PRN  [Held by provider] bumetanide (BUMEX) injection 1 mg, 1 mg, IntraVENous, BID  hydrALAZINE (APRESOLINE) injection 10 mg, 10 mg, IntraVENous, Q4H PRN    Allergies:  Latex, Bee venom, Dilaudid [hydromorphone hcl], Dye [iodides], Percocet [oxycodone-acetaminophen], Keflex [cephalexin], Lasix [furosemide], Levaquin [levofloxacin in d5w], Lipitor, Lyrica [pregabalin], Morphine, Naproxen, Nefazodone, Norvasc [amlodipine], Oxycodone-acetaminophen, Shellfish-derived products, and Trazodone and nefazodone    Social History:    Current smoker: 0.25 PPD x 40 years   Denies alcohol  Marijuana use: 1-2 times per month. Resides alone but has family near by to assist with getting food and taking her to her appointments. Family History: Noncontributory due to advanced age. REVIEW OF SYSTEMS:     · Constitutional: + fatigue, +fevers +chills. Denies  night sweats  · Eyes: Denies visual changes or drainage  · ENT: Denies headaches or hearing loss. No mouth sores or sore throat. No epistaxis   · Cardiovascular: See HPI. · Respiratory: +SAUNDERS, + cough, +orthopnea. Denies PND. No hemoptysis   · Gastrointestinal: Denies hematemesis or anorexia. No hematochezia or melena    · Genitourinary: Denies urgency, dysuria or hematuria. · Musculoskeletal: Denies gait disturbance, weakness or joint complaints  · Integumentary: Denies rash, hives or pruritis   · Neurological: Denies dizziness, headaches or seizures. No numbness or tingling  · Psychiatric: Denies anxiety or depression. · Endocrine: Denies temperature intolerance. No recent weight change.  .  · Hematologic/Lymphatic: Denies abnormal bruising or bleeding. No swollen lymph nodes    PHYSICAL EXAM:   BP (!) 179/75   Pulse 61   Temp 98 °F (36.7 °C) (Temporal)   Resp 16   Ht 5' 2\" (1.575 m)   Wt 213 lb 9.6 oz (96.9 kg)   LMP 1990   SpO2 100%   BMI 39.07 kg/m²   CONST:  Well developed, well nourished middle aged AA female who appears of stated age. Awake, alert and cooperative. No apparent distress. Dyspenic during conversation. HEENT:   Head- Normocephalic, atraumatic   Eyes- Conjunctivae pink, anicteric  Throat- Oral mucosa pink and moist  Neck-  Unable to assess   CHEST: Chest symmetrical and non-tender to palpation. No accessory muscle use or intercostal retractions  RESPIRATORY: Lung sounds - Coarse rhonchi with expiratory wheeze. On Supplemental O2.   CARDIOVASCULAR:     Heart Inspection- shows no noted pulsations  Heart Palpation- no heaves or thrills; PMI is non-displaced   Heart Ausculation- Regular rate and rhythm, no murmur. No s3, s4 or rub   PV: No lower extremity edema. No varicosities. Pedal pulses palpable, no clubbing or cyanosis   ABDOMEN: Soft, obese,  non-tender to light palpation. Bowel sounds present. No palpable masses no organomegaly; no abdominal bruit  MS: Good muscle strength and tone. No atrophy or abnormal movements. : Deferred  SKIN: Warm and dry no statis dermatitis or ulcers   NEURO / PSYCH: Oriented to person, place and time. Speech clear and appropriate. Follows all commands. Flat affect.      DATA:    EC2022 NSR, cRBBB, rate 94 bpm.  Tele strips: SR, HR 60 bpm.   Diagnostic:      Intake/Output Summary (Last 24 hours) at 2022 1754  Last data filed at 2022 0558  Gross per 24 hour   Intake 340 ml   Output 750 ml   Net -410 ml       Labs:   CBC:   Recent Labs     22  0541 22  0540   WBC 8.1 7.6   HGB 12.0 12.0   HCT 38.4 38.2    205     BMP:   Recent Labs     22  0541 22  0540    133   K 5.1* 5.1*   CO2 27 25   BUN 33* 38*   CREATININE 1.6* 1.6*   LABGLOM 39 39   CALCIUM 8.5* 8.4*     Mag:   Recent Labs     04/12/22  0541 04/13/22  0540   MG 2.3 2.2     Phos: No results for input(s): PHOS in the last 72 hours. TFT:   Lab Results   Component Value Date    TSH 0.646 02/05/2021    F5JJAOA 7.9 11/07/2017    T4FREE 1.34 12/01/2016      HgA1c:   Lab Results   Component Value Date    LABA1C 6.9 (H) 07/16/2021     No results found for: EAG  proBNP:   Recent Labs     04/11/22  1004   PROBNP 2,108*     CARDIAC ENZYMES:  Recent Labs     04/11/22  1122 04/11/22  2133   TROPHS 12* 10*     FASTING LIPID PANEL:  Lab Results   Component Value Date    CHOL 129 04/12/2022    HDL 32 04/12/2022    LDLCALC 74 04/12/2022    TRIG 114 04/12/2022     LIVER PROFILE:  Recent Labs     04/11/22  1122   AST 18   ALT 15   LABALBU 3.3*     CXR: 4/11/2022:  Impression   Bilateral airspace disease likely related to cardiogenic edema.  See above. Assessment/Plan:   1. Acute hypoxic respiratory failure: On supplemental oxygen. History of chronic bronchitis secondary to tobacco use and COPD exacerbation. Management per pulmonology and primary service    2. Acute on chronic heart failure with improved ejection fraction  · LVEF 35-45% on TTE 1/2017, LVEF 65% on TTE 6/5/2018, LVEF 35-60% on TTE 6/11/2021. Awaiting echocardiogram to guide therapy  Follow low-salt diet and maintain good blood pressure control  Hold Entresto and spironolactone and continue on Imdur and hydralazine and uptitrate for optimal blood pressure control. 3. ACC stage C/NYHA class II-III  As mentioned above  4. Mildly Hypervolemic   As mentioned above  5. History of CAD status post CABG in 2011 (LIMA-LAD, SVG-OM1). Continue beta-blocker, statin and aspirin  Awaiting echocardiogram to guide therapy and ischemic evaluation will discuss once stable    6. Borderline elevated hs-cTnT (12>>10): pattern not consistent with ACS. CP free with no EKG changes. Awaiting echocardiogram to guide therapy    7.  Obesity: BMI 38.59 kg/m2  8. Acute on chronic kidney disease  Avoid nephrotoxic agents and consider nephrology consultation. 9. DAYAN: On trilogy (unable to tolerate CPAP)  10. Hypertension/LVH: /66 - 132/59   11. Sinus bradycardia   12. Hyperlipidemia: intolerant to Lipitor / Tolerates Zocor 20 mg QD. 13. Type 2 diabetes mellitus  14. Tobacco and marijuana use  Smoking cessation was recommended  15. PAD status post left popliteal and tibial atherectomy/angioplasty  16. Chronic right bundle branch block  17. History of Covid-19 infection (4/2021)  18. Hyperkalemia   19.  Bladder incontinent  Management per primary service

## 2022-04-13 NOTE — PROGRESS NOTES
Chief Complaint:  Chief Complaint   Patient presents with    Respiratory Distress     increased SOB the last 4 days. was 90% on trilogy oxygen concentrator at home. EMS placed on CPAP, arrived at 95%, hx of COPD      Acute on chronic diastolic (congestive) heart failure (HCC)     Subjective:    Complains of persistent dyspnea. Objective:    /75   Pulse 61   Temp 97.8 °F (36.6 °C) (Temporal)   Resp 18   Ht 5' 2\" (1.575 m)   Wt 213 lb 9.6 oz (96.9 kg)   LMP 01/01/1990   SpO2 98%   BMI 39.07 kg/m²     Current medications that patient is taking have been reviewed.     General appearance: NAD, conversant, obese, very chronically ill appearing but comfortable/nontoxic appearing  HEENT: AT/NC, MMM  Neck: FROM, supple  Lungs: Dull, b/l rales, WOB normal  CV: RRR, no MRGs  Abdomen: Soft, non-tender; no masses or HSM, +BS  Extremities: No peripheral edema or digital cyanosis  Skin: no rash, lesions or ulcers  Psych: Calm and cooperative  Neuro: Alert and interactive, face symmetric, moving all extremities, speech fluent    Labs:  CBC with Differential:    Lab Results   Component Value Date    WBC 7.6 04/13/2022    RBC 3.77 04/13/2022    HGB 12.0 04/13/2022    HCT 38.2 04/13/2022     04/13/2022    .3 04/13/2022    MCH 31.8 04/13/2022    MCHC 31.4 04/13/2022    RDW 11.8 04/13/2022    NRBC 0.0 04/22/2021    SEGSPCT 84 03/03/2014    LYMPHOPCT 6.5 04/13/2022    MONOPCT 4.6 04/13/2022    BASOPCT 0.1 04/13/2022    MONOSABS 0.35 04/13/2022    LYMPHSABS 0.49 04/13/2022    EOSABS 0.01 04/13/2022    BASOSABS 0.01 04/13/2022     CMP:    Lab Results   Component Value Date     04/13/2022    K 5.1 04/13/2022    K 4.9 04/11/2022    CL 97 04/13/2022    CO2 25 04/13/2022    BUN 38 04/13/2022    CREATININE 1.6 04/13/2022    GFRAA 39 04/13/2022    LABGLOM 39 04/13/2022    GLUCOSE 314 04/13/2022    GLUCOSE 181 12/01/2011    PROT 6.3 04/11/2022    LABALBU 3.3 04/11/2022    LABALBU 5.2 12/01/2011    CALCIUM 8.4 04/13/2022    BILITOT 0.5 04/11/2022    ALKPHOS 71 04/11/2022    AST 18 04/11/2022    ALT 15 04/11/2022            Assessment/Plan:  Principal Problem:    Acute on chronic diastolic (congestive) heart failure (HCC)  Active Problems:    Chronic respiratory failure with hypoxia and hypercapnia ([facility])    DAYAN and COPD overlap syndrome ([facility])    DM2 (diabetes mellitus, type 2) ([facility])    Severe obesity (BMI 35.0-35.9 with comorbidity) ([facility]    Encounter for tobacco use cessation counseling    Prolonged Q-T interval on ECG    COPD exacerbation ([facility])    Respiratory distress    CKD (chronic kidney disease) stage 3, GFR 30-59 ml/min ([facility])    Hypertensive emergency    Acute kidney injury superimposed on CKD ([facility]  Resolved Problems:    * No resolved hospital problems.  *       ALEC on CKD stable    IV Bumex stopped by nephrology    Further diuresis per their service    COPD stable    Glucose improving    HyperK -- mild, continue to hold spironolactone and entresto    DVT prophylaxis: Lovenox  Requires continued inpatient level of care     Ioana Ngo MD    1:47 PM  4/13/2022

## 2022-04-13 NOTE — PROGRESS NOTES
On 4-13-22, this PCA offered to set up [facility] with all supplies she needed to wash herself. At the same time, [facility] said she needed her brief changed as well. This PCA then left the room and returned with 2 new briefs. This PCA then told [facility] to press her call light if she needed any help and or when she was finished washing. [facility] stated that she would need help washing her legs and back. This PCA stated that she should wash all she can on her own and to press the call light when she was ready for me. [facility] got upset with this PCA, asking why I wasn't washing her. I told her that part of my job is to promote her to perform all ADL's that she can do for herself. [facility] wasn't happy with the answer I gave her and procceded to tell me that when she is at [facility] that the PCA's know how she likes things and that they bath her from head to toe. I told [facility] that I couldn't comment on what happens when she is at [facility] because I don't work there. [facility] also stated to this PCA that she needs hypoallergenic sheets and gown. I then let her know that I would have to order those items. She then said that she would wait to wash until I was able to bring her those items. I told her that was fine, dumped out the wash basin that was full and let her know that I would place the order. I then left the room. Once back at the nurses station, I called linen and left a message for the hypoallergenic pack to be delivered.

## 2022-04-13 NOTE — CARE COORDINATION
4/13/22 Update CM Note; Patient remains on telemetry. Renal stopped iv bumex bid and advanced to qd. Creat 1.6 today. She is on 5lnc with attempts to wean per bedside nurse. She does c/o some shortness of breath per documentation. K 5.1 and cont to hold spironolactone and entresto. Her plan is to return home at discharge. PT 17/24 and OT 19/24. Will follow for home going needs.  Electronically signed by Shabana Alcantara RN CM on 4/13/2022 at 2:14 PM

## 2022-04-13 NOTE — CONSULTS
Patient currently admitted with diagnosis of Diastolic heart failure. Patient was alert and oriented sitting up in the chair performing ADL's during the consultation. She was engaged and asked appropriate questions throughout the education session. Scheduling with the CHF clinic, Cardiology APRN, and PCP (4/21/22 @ 1:45 PM) Yes. Federico Choi states she utilizes her insurance for transportation needs to medical appointments, the CHF clinic, will provide her 3 appointment date/times at her first visit to ensure she has time to arrange transportation. She also uses monthly pill packs for medication management. Future Appointments   Date Time Provider Melanie Sellers   4/26/2022 12:00 PM [facility] ROOM 1 [facility] Joe HOD   5/11/2022 10:00 AM PATRIZIA Cruz - CNP STEPHANIE [facility]   10/27/2022  2:00 PM Stephon Conte MD [facility]            We reviewed the introduction to Heart Failure, the HF zones, signs and symptoms to report on day 1 of onset, medications, medication compliance, the importance of obtaining daily weights, following a low sodium diet, reading food labels for the sodium content, keeping physician appointments, and smoking cessation. We discussed writing / tracking daily weights on a calendar / log because a 5 pound gain in 1 week can sneak up if you are not tracking it. Contributing risk factors for Heart Failure are identified as none. I advised patient they can reduce the risk for Heart Failure exacerbations by modifying / controlling the risk factors. We discussed self-managed care which includes the following:  to take medications as prescribed, report any intolerable side effects of medications to the cardiologist / doctor, do not just stop taking the medication; follow a cardiac heart healthy / low sodium diet; weigh yourself daily, exercise regularly- per doctor recommendation and not to smoke or use an excess amount of alcohol.         We discussed calling the cardiologist / doctor with a weight gain of 3 pounds in one day or a total of 5 pounds or more in one week. Also, if you should have a significant weight loss of 3# or more in one day to call the doctor, they may need to decrease or hold the diuretic dose. On days you feels nauseated and not eating / drinking, having emesis or diarrhea,  informed to call the cardiologist  / doctor, they may need to decrease or hold diuretic to avoid dehydration. I stressed the importance of informing their cardiologist the first day of onset of any of the signs and symptoms in the \"Yellow Zone\" of the HF Zones. Patient verbalizes understanding. Greater than 30 minutes was spent educating patient. The Heart Failure Booklet given to the patient with additional patient education addressing:  · What is Heart Failure? · Things You Can Do to Live Well with HF  · How to Take Your Medications  · How to Eat Less Salt  · [place] its Salt? · Exercising Well with Heart Failure  · Signs and symptoms of HF to report  · Weight Yourself Each Day  · Home Self Management- activity, weight tracking, taking medications as prescribed, meals /diet planning (sodium and fluid restriction), how to read food labels, keeping physician follow ups, smoking cessation, follow the Heart Failure Zones  · The Heart Failure zones  · Every Dose Every Day      Instructed  to call 911 if you have any of the following symptoms:    ·    Struggling to breathe unrelieved with rest,  ·    Having chest pain  ·    Have confusion or cant think clearly      The patient is ordered:  Diet: ADULT DIET; Regular; 4 carb choices (60 gm/meal); Low Sodium (2 gm);  Low Potassium (Less than 3000 mg/day)   Sodium controlled diet Yes  Fluid restriction daily ordered (fluid restriction recommended if patient is hyponatremic and/or diuretic is initiated or increased) No  FR:   Daily Weights:   Patient Vitals for the past 96 hrs (Last 3 readings):   Weight   04/13/22 0545 213 lb 9.6 oz (96.9 kg)   04/11/22 2107 211 lb (95.7 kg)     I/O:     Intake/Output Summary (Last 24 hours) at 4/13/2022 1314  Last data filed at 4/13/2022 0558  Gross per 24 hour   Intake 580 ml   Output 750 ml   Net -170 ml            Michelle Tru, RN BSN, RN  Heart Failure Navigator        CONGESTIVE HEART FAILURE (CHF) AHA GUIDELINES  (Must be completed for Primary Diagnosis CHF or History of CHF)    Discharge Plan:  I placed the Heart Failure Home Instructions in patient's discharge instructions. Per Heart Failure [facility], the patient should have a follow-up appointment made within 7 days of discharge.     New Diagnosis No    ECHO Results most recent:  Lab Results   Component Value Date    LVEF 58 06/11/2021                                        Social History     Tobacco Use   Smoking Status Current Every Day Smoker    Packs/day: 0.25    Years: 40.00    Pack years: 10.00    Types: Cigarettes    Start date: 8/10/1974   Smokeless Tobacco Never Used   Tobacco Comment    1 pack every 3 days (6-7 cig)        Immunization History   Administered Date(s) Administered    COVID-19, Pfizer Purple top, DILUTE for use, 12+ yrs, 30mcg/0.3mL dose 06/25/2021, 06/25/2021, 08/10/2021    Pneumococcal Polysaccharide ([id number]) 09/08/2020          Angiotensin-Converting-Enzyme (ACE) inhibitor ordered:  [] Yes  [x] No (specify contraindication):    [] Contraindicated  [] Hypotensive patient who was at immediate risk of cardiogenic shock  [] Hospitalized patient who experienced marked azotemia  [] Other Contraindications  [x] Not Eligible  [] Not Tolerant  [] Patient Reason  [] System Reason  [] Other Reason    Angiotensin II receptor blockers (ARB) ordered:  [] Yes  [x] No (specify contraindication):    [] Contraindicated  [] Hypotensive patient who was at immediate risk of cardiogenic shock  [] Hospitalized patient who experienced marked azotemia  [] Other Contraindications    ARNI - Angiotensin Receptor Neprilysin Inhibitor ordered:  [x] Yes Currently holding due to renal function  [] No (specify contraindication):    [] ACE inhibitor use within the prior 36 hours  [] Allergy  [] Hyperkalemia  [] Hypotension  [] Renal dysfunction defined as creatinine > 2.5 mg/dL in men or > 2.0 mg/dL in women  [] Other Contraindications  [] Not Eligible  [] Not Tolerant  [] Patient Reason  []System Reason  []Other Reason      Beta Blocker (Carvedilol, Metoprolol Succinate, or Bisoprolol) ordered:    [x] Yes  [] No (specify contraindication):    [] Contraindicated  [] Asthma  [] Fluid Overload  [] Low Blood Pressure  [] Patient recently treated with an intravenous positive inotropic agent  [] Other Contraindications  [] Not Eligible  [] Not Tolerant  [] Patient Reason  [] System Reason    SGLT2 Inhibitor ordered:  [] Yes  [x] No (specify contraindication):    [] Contraindicated  [] Patient currently on dialysis  [] Ketoacidosis  [] Known hypersensitivity to the medication  [] Type I diabetes (not approved for use in patients with Type I diabetes due to increased risk of ketoacidosis)  [] Other Contraindications  [] Not Eligible  [] Not Tolerant  [] Patient Reason  [x] System Reason  [] Other Reason    Aldosterone Antagonist ordered:  [x] Yes currently on hold due to hyperkalemia  [] No (specify contraindication):    [] Contraindicated  [] Allergy due to aldosterone receptor antagonist  [] Hyperkalemia  [] Renal dysfunction defined as creatinine >2.5 mg/dL in men or >2.0 mg/dL in women.   [] Other contraindications  [] Not Eligible  [] Not Tolerant  [] Patient Reason  [] System Reason  [] Other Reason

## 2022-04-13 NOTE — PROGRESS NOTES
[facility]  Nephrology Progress Note    Patient's Name: Megan Pride      History of Present Illness from 4/12 Consult Note:    Sherron Meyer is a 78 y.o. female with a past medical history of peripheral vascular disease, COPD, Covid, diverticulosis, coronary artery disease, hypertension, and hyperlipidemia. She presented to the ED on 4/11, per the H&P for shortness of breath x4 days. Vital signs at presentation to the ED include temperature 98.3, respirations 18, pulse 143, /169, and she was 95% on the CPAP. Lab data at presentation to the ED include CO2 21, creatinine 1.2, calcium 8.2, and albumin 3.3. Chest x-ray showed \"bilateral airspace disease. \"  We were consulted to see the patient for mild ALEC on CKD. Her baseline creatinine from July 2021 was 1.1-1.2. At present, patient was seen and examined. She explained to me that she came in for shortness of breath. She currently denies any chest pain. However, she explained that she continues to have intermittent shortness of breath. She denies any nausea or vomiting. She reports that she was having abdominal pain. She also explained that she has been experiencing weakness and fatigue. Prior to admission the patient explained that she was drinking okay, but did not have a good appetite. \"    Subjective:    4/13: Patient was seen and examined. She is sitting up in a chair. She reports that she has some nausea and a cough today.     PMH:    Past Medical History:   Diagnosis Date    Asthma     Atherosclerosis of native artery of left leg with rest pain ([facility] [address] 11/09/2018    CAD (coronary artery disease) 2011    Cellulitis and abscess of trunk 04/14/2015    Cerebellar infarct ([facility] [address] 04/03/2019    Remote, rt. cerebellum, head CT scan, 1/9/19    Chronic back pain     Chronic kidney disease     Chronic systolic congestive heart failure ([address] 10/04/2013    Chronic venous insufficiency 10/11/2017    COPD (chronic obstructive pulmonary disease) ([address]     COVID-19     Depression     Diabetes mellitus ([address]     Diabetic neuropathy ([address]     Diverticulosis     Fatty liver 01/08/2016    per [place]    Glaucoma, open angle 02/01/2016    Mild-OU    Hepatic encephalopathy ([address] 02/07/2016    resolved    Hiatal hernia     History of blood transfusion     Hyperlipidemia     Hyperplastic colon polyp     Hypertension     Incontinence     Liver mass     Morbid obesity ([address]     Movement disorder     Myocardial infarction ([address] 2011    O2 dependent 09/16/2021    with bipap 3 liters    Osteoarthritis, generalized     Pain in both lower legs 10/11/2017    Peripheral vascular disease ([address]     Pneumonia 01/26/2014    Pulmonary edema     resolved    PVD (peripheral vascular disease) with claudication ([address] 08/30/2017    Sleep apnea     uses BI PAP    Status post peripheral artery angioplasty 10/31/2019    Tobacco abuse     Tobacco abuse 10/11/2017    Tubular adenoma polyp of rectum     Urinary tract infection due to ESBL Klebsiella 03/08/2017    Ventral hernia      Patient Active Problem List   Diagnosis    Severe obesity (BMI 35.0-35.9 with comorbidity) ([address]    Hyperlipidemia    DAYAN and COPD overlap syndrome ([address]    Vitamin D insufficiency    Chronic combined systolic and diastolic heart failure (HCC)    GERD (gastroesophageal reflux disease)    Major depressive disorder, recurrent episode, mild ([address]    Diabetic polyneuropathy associated with type 2 diabetes mellitus (HCC)    Chronic passive hepatic congestion    Mixed incontinence urge and stress    Chronic back pain - d/t muscle spasm    Glaucoma, open angle    Elevated CA 19-9 level    Lumbar stenosis    Spondylosis 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    Lumbar disc herniation    Asymmetric septal hypertrophy (HCC)    Non-compliance    Hiatal hernia    Melanosis coli    Coronary artery disease involving native coronary artery of native heart without angina pectoris    DM2 (diabetes mellitus, type 2) ([facility] [address]    Cigarette smoker    Marijuana use, smoked    Ischemic cardiomyopathy    Encounter for tobacco use cessation counseling    PVD (peripheral vascular disease) with claudication ([facility])    Chronic venous insufficiency    History of non-ST elevation myocardial infarction (NSTEMI)    Prolonged Q-T interval on ECG    History of stroke    COPD exacerbation ([facility]    Chronic respiratory failure with hypoxia and hypercapnia ([facility])    COPD (chronic obstructive pulmonary disease) ([facility]    Status post peripheral artery angioplasty    Uncontrolled hypertension    O2 dependent    Respiratory distress    Acute on chronic diastolic (congestive) heart failure ([facility])    CKD (chronic kidney disease) stage 3, GFR 30-59 ml/min ([facility])    Hypertensive emergency    Acute kidney injury superimposed on CKD ([facility])     Meds:     sodium zirconium cyclosilicate  10 g Oral Once    insulin glargine  25 Units SubCUTAneous Nightly    lidocaine  1 patch TransDERmal Daily    predniSONE  40 mg Oral Daily    doxycycline hyclate  100 mg Oral 2 times per day    nicotine  1 patch TransDERmal Daily    labetalol  10 mg IntraVENous Once    aspirin  81 mg Oral Daily    carvedilol  25 mg Oral BID WC    cetirizine  10 mg Oral Daily    DULoxetine  60 mg Oral BID    famotidine  20 mg Oral Dinner    fluticasone  2 spray Nasal Daily    gabapentin  100 mg Oral BID    hydrALAZINE  50 mg Oral TID    ipratropium-albuterol  1 vial Nebulization Q4H WA    isosorbide mononitrate  60 mg Oral Daily    mirabegron  50 mg Oral Nightly    montelukast  10 mg Oral Nightly    pantoprazole  40 mg Oral Daily    [Held by provider] sacubitril-valsartan  1 tablet Oral BID    atorvastatin  10 mg Oral Nightly    [Held by provider] spironolactone  25 mg Oral Daily    traZODone  100 mg Oral Nightly    sodium chloride flush  5-40 mL IntraVENous 2 times per day    enoxaparin  40 mg SubCUTAneous Daily    insulin lispro  0-12 Units SubCUTAneous TID WC    insulin lispro  0-6 Units SubCUTAneous Nightly    [Held by provider] bumetanide  1 mg IntraVENous BID      sodium chloride      dextrose       Meds prn:     HYDROcodone 5 mg - acetaminophen, perflutren lipid microspheres, Arformoterol Tartrate, sodium chloride flush, sodium chloride, polyethylene glycol, acetaminophen **OR** acetaminophen, glucose, dextrose, glucagon (rDNA), dextrose, magnesium sulfate, potassium chloride **OR** potassium alternative oral replacement **OR** potassium chloride, hydrALAZINE    Meds prior to admission:     No current facility-administered medications on file prior to encounter. Current Outpatient Medications on File Prior to Encounter   Medication Sig Dispense Refill    acetaminophen (TYLENOL) 325 MG tablet Take 650 mg by mouth every 6 hours as needed for Pain      aspirin 81 MG chewable tablet Take 81 mg by mouth daily      Arformoterol Tartrate (BROVANA) 15 MCG/2ML NEBU Take 1 ampule by nebulization 2 times daily as needed      azelastine (ASTELIN) 0.1 % nasal spray 1 spray by Nasal route 2 times daily as needed for Rhinitis      cetirizine (ZYRTEC) 10 MG tablet Take 10 mg by mouth daily      DULoxetine (CYMBALTA) 60 MG extended release capsule Take 60 mg by mouth 2 times daily      sacubitril-valsartan (ENTRESTO) 49-51 MG per tablet Take 1 tablet by mouth 2 times daily      famotidine (PEPCID) 40 MG tablet Take 40 mg by mouth Daily with supper      fluticasone (FLONASE) 50 MCG/ACT nasal spray 2 sprays by Nasal route daily      gabapentin (NEURONTIN) 100 MG capsule Take 100 mg by mouth 2 times daily.       Insulin Degludec (TRESIBA FLEXTOUCH) 200 UNIT/ML SOPN Inject 50 Units into the skin at bedtime      insulin lispro, 1 Unit Dial, (HUMALOG KWIKPEN) 100 UNIT/ML SOPN Inject 0-6 Units into the skin 3 times daily (before meals) *Per Sliding Scale*      ipratropium-albuterol (DUONEB) 0.5-2.5 (3) MG/3ML SOLN nebulizer solution Take 1 vial by nebulization every 4 hours as needed for Shortness of Breath      isosorbide mononitrate (IMDUR) 60 MG extended release tablet Take 60 mg by mouth daily      montelukast (SINGULAIR) 10 MG tablet Take 10 mg by mouth nightly      mirabegron (MYRBETRIQ) 50 MG TB24 Take 50 mg by mouth at bedtime      simvastatin (ZOCOR) 20 MG tablet Take 20 mg by mouth nightly      tiotropium (SPIRIVA RESPIMAT) 1.25 MCG/ACT AERS inhaler Inhale 2 puffs into the lungs daily      traZODone (DESYREL) 100 MG tablet Take 100 mg by mouth nightly      vitamin D (CHOLECALCIFEROL) 25 MCG (1000 UT) TABS tablet Take 1,000 Units by mouth daily      bumetanide (BUMEX) 1 MG tablet Take 1 tablet by mouth daily 30 tablet 11    spironolactone (ALDACTONE) 25 MG tablet Take 1 tablet by mouth daily 90 tablet 3    carvedilol (COREG) 25 MG tablet Take 1 tablet by mouth 2 times daily (with meals) 180 tablet 3    hydrALAZINE (APRESOLINE) 50 MG tablet Take 50 mg by mouth 3 times daily      pantoprazole (PROTONIX) 40 MG tablet Take 40 mg by mouth daily       metoclopramide (REGLAN) 5 MG tablet Take 5 mg by mouth 3 times daily as needed  120 tablet 3    docusate sodium (COLACE) 100 MG capsule Take 200 mg by mouth at bedtime        Allergies:    Latex, Bee venom, Dilaudid [hydromorphone hcl], Dye [iodides], Percocet [oxycodone-acetaminophen], Keflex [cephalexin], Lasix [furosemide], Levaquin [levofloxacin in d5w], Lipitor, Lyrica [pregabalin], Morphine, Naproxen, Nefazodone, Norvasc [amlodipine], Oxycodone-acetaminophen, Shellfish-derived products, and Trazodone and nefazodone    Social History:     reports that she has been smoking cigarettes. She started smoking about 47 years ago. She has a 10.00 pack-year smoking history. She has never used smokeless tobacco. She reports current alcohol use. She reports previous drug use. Drug: Marijuana Janneth Castano.     Family History:         Problem Relation Age of Onset  Cancer Mother     Asthma Father      Physical Exam:    Patient Vitals for the past 24 hrs:   BP Temp Temp src Pulse Resp SpO2 Weight   04/13/22 0853 -- -- -- -- -- 98 % --   04/13/22 0830 108/74 97.5 °F (36.4 °C) Temporal 57 18 98 % --   04/13/22 0545 -- -- -- -- -- -- 213 lb 9.6 oz (96.9 kg)   04/13/22 0431 -- -- -- -- 17 -- --   04/13/22 0415 (!) 101/47 -- -- 55 18 -- --   04/13/22 0119 -- -- -- -- 18 -- --   04/13/22 0018 (!) 104/56 -- -- 53 16 -- --   04/12/22 2222 -- -- -- -- 20 -- --   04/12/22 2058 92/63 97.5 °F (36.4 °C) Temporal 61 18 100 % --   04/12/22 1521 (!) 100/50 96.4 °F (35.8 °C) Tympanic 60 20 99 % --   04/12/22 1208 -- -- -- -- 24 -- --   04/12/22 1205 -- -- -- -- -- 90 % --   04/12/22 1109 (!) 132/59 96.4 °F (35.8 °C) Tympanic 56 17 98 % --       Intake/Output Summary (Last 24 hours) at 4/13/2022 0952  Last data filed at 4/13/2022 0558  Gross per 24 hour   Intake 580 ml   Output 750 ml   Net -170 ml     General: Awake, alert, no acute distress  Neck: No JVD noted  Lungs:  Crackles bilaterally upper, diminished to the bases bilaterally. Unlabored  CV: Regular rate and rhythm. No rub  Abd:  Rounded, soft, nontender, nondistended. Active bowel sounds  Skin: Warm and dry. No rash on exposed extremities  Ext: No edema   Neuro: Awake, answers questions appropriately    Data:    Recent Labs     04/11/22  1004 04/12/22  0541 04/13/22  0540   WBC 10.1 8.1 7.6   HGB 14.7 12.0 12.0   HCT 46.1 38.4 38.2   MCV 99.6 101.3* 101.3*    219 205     Recent Labs     04/11/22  1122 04/11/22  1122 04/11/22  1859 04/12/22  0541 04/13/22  0540     --   --  135 133   K 4.9  --   --  5.1* 5.1*     --   --  98 97*   CO2 21*  --   --  27 25   CREATININE 1.2*  --   --  1.6* 1.6*   BUN 22  --   --  33* 38*   LABGLOM 54  --   --  39 39   GLUCOSE 274*   < > 342 199* 314*   CALCIUM 8.2*  --   --  8.5* 8.4*   MG  --   --   --  2.3 2.2    < > = values in this interval not displayed.      Vit D, 25-Hydroxy Date Value Ref Range Status   02/05/2021 29 (L) 30 - 100 ng/mL Final     Comment:     <20 ng/mL. ........... Bula Dayhoff Deficient  20-30 ng/mL. ......... Bula Dayhoff Insufficient   ng/mL. ........ Bula Dayhoff Sufficient  >100 ng/mL. .......... Bula Dayhoff Toxic       No results found for: PTH    Recent Labs     04/11/22  1122   ALT 15   AST 18   ALKPHOS 71   BILITOT 0.5     Recent Labs     04/11/22  1122   LABALBU 3.3*     Ferritin   Date Value Ref Range Status   11/02/2017 172 ng/mL Final     Comment:     FERRITIN Reference Ranges:  Adult Males   20 - 60 yrars:    30 - 400 ng/mL  Adult females 17 - 60 years:    13 - 150 ng/mL  Adults greater than 60 years:   no established reference range  Pediatrics:                     no established reference range       Iron   Date Value Ref Range Status   11/02/2017 51 37 - 145 mcg/dL Final     TIBC   Date Value Ref Range Status   11/02/2017 266 250 - 450 mcg/dL Final     Vitamin B-12   Date Value Ref Range Status   06/16/2020 1943 (H) 211 - 946 pg/mL Final     Folate   Date Value Ref Range Status   06/16/2020 >20.0 4.8 - 24.2 ng/mL Final     Lab Results   Component Value Date    COLORU Yellow 04/12/2022    NITRU Negative 04/12/2022    GLUCOSEU Negative 04/12/2022    GLUCOSEU NEGATIVE 04/25/2011    KETUA Negative 04/12/2022    UROBILINOGEN 0.2 04/12/2022    BILIRUBINUR Negative 04/12/2022    BILIRUBINUR negative 03/03/2020    BILIRUBINUR NEGATIVE 04/25/2011     No results found for: RAZIA, CREURRAN, MACREATRATIO, OSMOU    No components found for: URIC    No results found for: LIPIDPAN    Assessment and Plan:    1. ALEC stage I on CKD 3 a  Likely prerenal in setting of decreased effective renal perfusion given the use of bumex/ spironolactone, with entresto contributing   baseline creatinine from July 2021 was 1.1-1.2  Continue to hold Entresto, spironolactone, and Bumex  Cr 1.6 today   FeNa 1%, FeUrea 29.8%  Strict I&O, daily weights  Avoid nephrotoxins/NSAIDs  Monitor labs  Follow    2.   HFpEF  proBNP from 4/11 was 2108  Echo 6/2021 showed EF 55-60%, left ventricular hypertrophy severe  Chest x-ray showed \"bilateral airspace disease\"  Entresto on hold  Bumex 1 mg IV twice daily and spironolactone 25 mg oral daily on hold  Strict I&O, daily weights  Cardiology also following    3. Hypertension  BP goal<130/80  BP nearing goal  Follow on current regimen    4. Type 2 diabetes mellitus  Hemoglobin A1c from 7/2021 was 6.9%  On insulin lispro and insulin glargine  Management per primary    5. Hyperkalemia  likely in setting of ALEC/CKD/Entresto  K+ 5.1 today  Entresto on hold  Will dose Lokelma today as K+ still 5.1 today   Low potassium diet  Follow    PATRIZIA Jefferson - CNP     Patient seen and examined all key components of the physical performed independently , case discussed with NP, all pertinent labs and radiologic tests personally reviewed agree with above.     She reports urinary incontinence which she bypassed unbearable  Will begin Ditropan  Remains short of breath with minimal activity  Will resume diuretics    Pearlean Sandifer, MD

## 2022-04-14 LAB
ANION GAP SERPL CALCULATED.3IONS-SCNC: 9 MMOL/L (ref 7–16)
BASOPHILS ABSOLUTE: 0.01 E9/L (ref 0–0.2)
BASOPHILS RELATIVE PERCENT: 0.1 % (ref 0–2)
BUN BLDV-MCNC: 38 MG/DL (ref 6–23)
CALCIUM SERPL-MCNC: 8.8 MG/DL (ref 8.6–10.2)
CHLORIDE BLD-SCNC: 96 MMOL/L (ref 98–107)
CO2: 31 MMOL/L (ref 22–29)
CREAT SERPL-MCNC: 1.5 MG/DL (ref 0.5–1)
EOSINOPHILS ABSOLUTE: 0.03 E9/L (ref 0.05–0.5)
EOSINOPHILS RELATIVE PERCENT: 0.4 % (ref 0–6)
GFR AFRICAN AMERICAN: 42
GFR NON-AFRICAN AMERICAN: 42 ML/MIN/1.73
GLUCOSE BLD-MCNC: 221 MG/DL (ref 74–99)
HCT VFR BLD CALC: 41.7 % (ref 34–48)
HEMOGLOBIN: 13.2 G/DL (ref 11.5–15.5)
IMMATURE GRANULOCYTES #: 0.05 E9/L
IMMATURE GRANULOCYTES %: 0.6 % (ref 0–5)
LYMPHOCYTES ABSOLUTE: 0.84 E9/L (ref 1.5–4)
LYMPHOCYTES RELATIVE PERCENT: 10.7 % (ref 20–42)
MAGNESIUM: 1.8 MG/DL (ref 1.6–2.6)
MCH RBC QN AUTO: 32.4 PG (ref 26–35)
MCHC RBC AUTO-ENTMCNC: 31.7 % (ref 32–34.5)
MCV RBC AUTO: 102.2 FL (ref 80–99.9)
METER GLUCOSE: 190 MG/DL (ref 74–99)
METER GLUCOSE: 300 MG/DL (ref 74–99)
METER GLUCOSE: 366 MG/DL (ref 74–99)
MONOCYTES ABSOLUTE: 0.51 E9/L (ref 0.1–0.95)
MONOCYTES RELATIVE PERCENT: 6.5 % (ref 2–12)
NEUTROPHILS ABSOLUTE: 6.43 E9/L (ref 1.8–7.3)
NEUTROPHILS RELATIVE PERCENT: 81.7 % (ref 43–80)
PDW BLD-RTO: 11.5 FL (ref 11.5–15)
PLATELET # BLD: 215 E9/L (ref 130–450)
PMV BLD AUTO: 10.5 FL (ref 7–12)
POTASSIUM SERPL-SCNC: 4.5 MMOL/L (ref 3.5–5)
RBC # BLD: 4.08 E12/L (ref 3.5–5.5)
SODIUM BLD-SCNC: 136 MMOL/L (ref 132–146)
WBC # BLD: 7.9 E9/L (ref 4.5–11.5)

## 2022-04-14 PROCEDURE — 6370000000 HC RX 637 (ALT 250 FOR IP): Performed by: PHYSICIAN ASSISTANT

## 2022-04-14 PROCEDURE — 2700000000 HC OXYGEN THERAPY PER DAY

## 2022-04-14 PROCEDURE — 2580000003 HC RX 258: Performed by: PHYSICIAN ASSISTANT

## 2022-04-14 PROCEDURE — 6370000000 HC RX 637 (ALT 250 FOR IP): Performed by: INTERNAL MEDICINE

## 2022-04-14 PROCEDURE — 99233 SBSQ HOSP IP/OBS HIGH 50: CPT | Performed by: INTERNAL MEDICINE

## 2022-04-14 PROCEDURE — 6370000000 HC RX 637 (ALT 250 FOR IP): Performed by: NURSE PRACTITIONER

## 2022-04-14 PROCEDURE — 2140000000 HC CCU INTERMEDIATE R&B

## 2022-04-14 PROCEDURE — 97530 THERAPEUTIC ACTIVITIES: CPT

## 2022-04-14 PROCEDURE — 36415 COLL VENOUS BLD VENIPUNCTURE: CPT

## 2022-04-14 PROCEDURE — 85025 COMPLETE CBC W/AUTO DIFF WBC: CPT

## 2022-04-14 PROCEDURE — S5553 INSULIN LONG ACTING 5 U: HCPCS | Performed by: INTERNAL MEDICINE

## 2022-04-14 PROCEDURE — 83735 ASSAY OF MAGNESIUM: CPT

## 2022-04-14 PROCEDURE — 97535 SELF CARE MNGMENT TRAINING: CPT

## 2022-04-14 PROCEDURE — 6360000002 HC RX W HCPCS: Performed by: PHYSICIAN ASSISTANT

## 2022-04-14 PROCEDURE — 94660 CPAP INITIATION&MGMT: CPT

## 2022-04-14 PROCEDURE — 80048 BASIC METABOLIC PNL TOTAL CA: CPT

## 2022-04-14 PROCEDURE — 82962 GLUCOSE BLOOD TEST: CPT

## 2022-04-14 PROCEDURE — 94640 AIRWAY INHALATION TREATMENT: CPT

## 2022-04-14 RX ADMIN — DULOXETINE HYDROCHLORIDE 60 MG: 60 CAPSULE, DELAYED RELEASE ORAL at 09:36

## 2022-04-14 RX ADMIN — GUAIFENESIN 200 MG: 200 SOLUTION ORAL at 14:59

## 2022-04-14 RX ADMIN — HYDROCODONE BITARTRATE AND ACETAMINOPHEN 1 TABLET: 5; 325 TABLET ORAL at 10:02

## 2022-04-14 RX ADMIN — IPRATROPIUM BROMIDE AND ALBUTEROL SULFATE 3 ML: .5; 2.5 SOLUTION RESPIRATORY (INHALATION) at 08:11

## 2022-04-14 RX ADMIN — SODIUM CHLORIDE, PRESERVATIVE FREE 10 ML: 5 INJECTION INTRAVENOUS at 22:02

## 2022-04-14 RX ADMIN — ENOXAPARIN SODIUM 40 MG: 100 INJECTION SUBCUTANEOUS at 09:00

## 2022-04-14 RX ADMIN — HYDRALAZINE HYDROCHLORIDE 50 MG: 25 TABLET, FILM COATED ORAL at 09:39

## 2022-04-14 RX ADMIN — PANTOPRAZOLE SODIUM 40 MG: 40 TABLET, DELAYED RELEASE ORAL at 10:01

## 2022-04-14 RX ADMIN — HYDRALAZINE HYDROCHLORIDE 50 MG: 25 TABLET, FILM COATED ORAL at 22:01

## 2022-04-14 RX ADMIN — IPRATROPIUM BROMIDE AND ALBUTEROL SULFATE 3 ML: .5; 2.5 SOLUTION RESPIRATORY (INHALATION) at 19:30

## 2022-04-14 RX ADMIN — DOXYCYCLINE HYCLATE 100 MG: 100 CAPSULE ORAL at 09:00

## 2022-04-14 RX ADMIN — INSULIN LISPRO 4 UNITS: 100 INJECTION, SOLUTION INTRAVENOUS; SUBCUTANEOUS at 16:24

## 2022-04-14 RX ADMIN — INSULIN LISPRO 4 UNITS: 100 INJECTION, SOLUTION INTRAVENOUS; SUBCUTANEOUS at 08:00

## 2022-04-14 RX ADMIN — FLUTICASONE PROPIONATE 2 SPRAY: 50 SPRAY, METERED NASAL at 09:39

## 2022-04-14 RX ADMIN — SODIUM CHLORIDE, PRESERVATIVE FREE 10 ML: 5 INJECTION INTRAVENOUS at 09:00

## 2022-04-14 RX ADMIN — PREDNISONE 40 MG: 20 TABLET ORAL at 09:36

## 2022-04-14 RX ADMIN — INSULIN LISPRO 10 UNITS: 100 INJECTION, SOLUTION INTRAVENOUS; SUBCUTANEOUS at 16:20

## 2022-04-14 RX ADMIN — GABAPENTIN 100 MG: 100 CAPSULE ORAL at 22:01

## 2022-04-14 RX ADMIN — CARVEDILOL 25 MG: 25 TABLET, FILM COATED ORAL at 09:00

## 2022-04-14 RX ADMIN — ATORVASTATIN CALCIUM 10 MG: 10 TABLET, FILM COATED ORAL at 22:00

## 2022-04-14 RX ADMIN — INSULIN GLARGINE-YFGN 25 UNITS: 100 INJECTION, SOLUTION SUBCUTANEOUS at 22:03

## 2022-04-14 RX ADMIN — INSULIN LISPRO 5 UNITS: 100 INJECTION, SOLUTION INTRAVENOUS; SUBCUTANEOUS at 22:02

## 2022-04-14 RX ADMIN — BUMETANIDE 2 MG: 1 TABLET ORAL at 09:36

## 2022-04-14 RX ADMIN — ASPIRIN 81 MG 81 MG: 81 TABLET ORAL at 09:38

## 2022-04-14 RX ADMIN — TRAZODONE HYDROCHLORIDE 100 MG: 50 TABLET ORAL at 22:00

## 2022-04-14 RX ADMIN — HYDROCODONE BITARTRATE AND ACETAMINOPHEN 1 TABLET: 5; 325 TABLET ORAL at 20:30

## 2022-04-14 RX ADMIN — INSULIN LISPRO 2 UNITS: 100 INJECTION, SOLUTION INTRAVENOUS; SUBCUTANEOUS at 12:32

## 2022-04-14 RX ADMIN — HYDROCORTISONE: 25 CREAM TOPICAL at 16:00

## 2022-04-14 RX ADMIN — GUAIFENESIN 200 MG: 200 SOLUTION ORAL at 21:56

## 2022-04-14 RX ADMIN — FAMOTIDINE 20 MG: 20 TABLET ORAL at 17:00

## 2022-04-14 RX ADMIN — OXYBUTYNIN CHLORIDE 5 MG: 5 TABLET ORAL at 22:01

## 2022-04-14 RX ADMIN — HYDRALAZINE HYDROCHLORIDE 50 MG: 25 TABLET, FILM COATED ORAL at 14:40

## 2022-04-14 RX ADMIN — CARVEDILOL 25 MG: 25 TABLET, FILM COATED ORAL at 17:00

## 2022-04-14 RX ADMIN — HYDROCORTISONE: 25 CREAM TOPICAL at 22:01

## 2022-04-14 RX ADMIN — GABAPENTIN 100 MG: 100 CAPSULE ORAL at 09:00

## 2022-04-14 RX ADMIN — DULOXETINE HYDROCHLORIDE 60 MG: 60 CAPSULE, DELAYED RELEASE ORAL at 22:01

## 2022-04-14 RX ADMIN — ISOSORBIDE MONONITRATE 60 MG: 60 TABLET, EXTENDED RELEASE ORAL at 09:37

## 2022-04-14 RX ADMIN — MONTELUKAST SODIUM 10 MG: 10 TABLET ORAL at 21:56

## 2022-04-14 RX ADMIN — GUAIFENESIN 200 MG: 200 SOLUTION ORAL at 04:34

## 2022-04-14 RX ADMIN — IPRATROPIUM BROMIDE AND ALBUTEROL SULFATE 3 ML: .5; 2.5 SOLUTION RESPIRATORY (INHALATION) at 15:45

## 2022-04-14 RX ADMIN — DOXYCYCLINE HYCLATE 100 MG: 100 CAPSULE ORAL at 22:00

## 2022-04-14 RX ADMIN — CETIRIZINE HYDROCHLORIDE 10 MG: 10 TABLET, FILM COATED ORAL at 09:38

## 2022-04-14 RX ADMIN — OXYBUTYNIN CHLORIDE 5 MG: 5 TABLET ORAL at 09:38

## 2022-04-14 ASSESSMENT — PAIN DESCRIPTION - ORIENTATION: ORIENTATION: RIGHT

## 2022-04-14 ASSESSMENT — PAIN SCALES - GENERAL
PAINLEVEL_OUTOF10: 0
PAINLEVEL_OUTOF10: 10
PAINLEVEL_OUTOF10: 7
PAINLEVEL_OUTOF10: 0
PAINLEVEL_OUTOF10: 7

## 2022-04-14 ASSESSMENT — PAIN DESCRIPTION - PROGRESSION: CLINICAL_PROGRESSION: NOT CHANGED

## 2022-04-14 ASSESSMENT — PAIN DESCRIPTION - PAIN TYPE: TYPE: CHRONIC PAIN

## 2022-04-14 ASSESSMENT — PAIN DESCRIPTION - LOCATION: LOCATION: BACK

## 2022-04-14 NOTE — PROGRESS NOTES
//      Inpatient Cardiology Consultation follow-up visit    Reason for Consult:  CHF    Consulting Physician: Dr. Fox Maciel    Requesting Physician:  TONY Rankin    Date of Consultation: 4/14/2022    Subjective: Reports feeling better today. He is responding to diuresis. We will plan for possible Lexiscan stress test on Saturday. Past Medical History:  (Please note the following information was obtained from consult note on 7/16/2021 when additional information added). 1. Morbid obesity  2. Hypertension  3. Hyperlipidemia. Currently not on a statin but has been intolerant of Lipitor  4. Current tobacco abuse  5. Diabetes which is insulin requiring , diabetic neuropathy   6. Obstructive sleep apnea, states compliance with Cpap  7. Ischemic cardiomyopathy with a CABG in 2011 with a LIMA to the LAD and a saphenous vein graft to the marginal last cardiac catheterization was in April 2015 and the LIMA was unable to be visualized and the saphenous vein graft to the obtuse marginal was patent.   8. 2-D echocardiogram January 4, 2017, EF 35-45% and diffuse hypokinesis of the left ventricle and left atrium is dilatedAortic valve appears mildly sclerotic and there is mild mitral regurgitation  9. NSTEMI: 6/6/2018 (troponin 0.35, 0.32, 0.48) no rise in CK or CK-MB.  Creatinine 1.0.  Per cardiology, probable recent NSTEMI, type II low risk noninvasive 6/5/2018.  Patient was diuresed approximately 10 L --> And discharged home on loop diuretic. 10. Lexiscan MPS: 6/5/2018: Pharmacologically induced perfusion defect involving the lateral wall, without evidence of reversibility, diffusely hypokinetic left ventricular wall motion with a focal area of akinesis involving the mid lateral wall, LVEF 42%. 11. TTE: 6/5/2018: LVEF 65% by biplane, stage II diastolic dysfunction, moderate LVH, no wall motion abnormality, normal RV function, no pericardial effusion.   12. TTE 06/11/2021 (Dr. Margarita Mcmahan): Left ventricle size is normal. Ejection fraction is visually estimated at 55-60%. No regional wall motion abnormalities seen. Severe concentric left ventricular hypertrophy. Indeterminate diastolic function. Normal right ventricular size. Right ventricle global systolic function is mildly reduced . TAPSE 15 mm.  No hemodynamically significant aortic stenosis is present. Physiologic and/or trace tricuspid regurgitation. RVSP is 26 mmHg. Normal estimated PA systolic pressure. No evidence for hemodynamically significant pericardial effusion. 13. Chronic RBBB    14. IV dye, Lipitor, Lasix, Norvasc allergies: Unknown reaction per patient  15. C. diff 2015  16. Asthma status post bronchial brush biopsy in January 2013   17. Osteoarthritis. And left knee replacement  18. Hx Liver biopsy. Hepatic encephalopathy  19. S/p Bilateral breast reduction   20. Diverticulosis. 21. Hyperplastic colon polyps and fatty liver  22. Hx Ventral hernia  23. Glaucoma  24. Hx Anemia and blood transfusion  25. Hx Pneumonia  26. Depression  27. S/p Cholecystectomy   28. Patient follows with neurology at [facility] and was diagnosed with chronic pain syndrome and is recommending chronic pain management. 29. History of severe peripheral vascular disease with history of left popliteal and tibial trunk atherectomy on 11/13/2018 was initiated on aspirin and Plavix. 27. COVID-19 infection (April 2021)  31. Hospital admission 7/15/2021-7/21/2021: Admitted for hypoxia /patient was diagnosed with Covid-19 infection 4/2021 and now requiring oxygen. Treated for acute hypoxic respiratory failure secondary to acute COPD exacerbation and acute on chronic HFpEF  32. 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 Medications Prior to admit:  Prior to Admission medications    Medication Sig Start Date End Date Taking?  Authorizing Provider   acetaminophen (TYLENOL) 325 MG tablet Take 650 mg by mouth every 6 hours as needed for Pain Yes Historical Provider, MD   aspirin 81 MG chewable tablet Take 81 mg by mouth daily   Yes Historical Provider, MD   Arformoterol Tartrate (BROVANA) 15 MCG/2ML NEBU Take 1 ampule by nebulization 2 times daily as needed   Yes Historical Provider, MD   azelastine (ASTELIN) 0.1 % nasal spray 1 spray by Nasal route 2 times daily as needed for Rhinitis   Yes Historical Provider, MD   cetirizine (ZYRTEC) 10 MG tablet Take 10 mg by mouth daily   Yes Historical Provider, MD   DULoxetine (CYMBALTA) 60 MG extended release capsule Take 60 mg by mouth 2 times daily   Yes Historical Provider, MD   sacubitril-valsartan (ENTRESTO) 49-51 MG per tablet Take 1 tablet by mouth 2 times daily   Yes Historical Provider, MD   famotidine (PEPCID) 40 MG tablet Take 40 mg by mouth Daily with supper   Yes Historical Provider, MD   fluticasone (FLONASE) 50 MCG/ACT nasal spray 2 sprays by Nasal route daily   Yes Historical Provider, MD   gabapentin (NEURONTIN) 100 MG capsule Take 100 mg by mouth 2 times daily.    Yes Historical Provider, MD   Insulin Degludec (TRESIBA FLEXTOUCH) 200 UNIT/ML SOPN Inject 50 Units into the skin at bedtime   Yes Historical Provider, MD   insulin lispro, 1 Unit Dial, (HUMALOG KWIKPEN) 100 UNIT/ML SOPN Inject 0-6 Units into the skin 3 times daily (before meals) *Per Sliding Scale*   Yes Historical Provider, MD   ipratropium-albuterol (DUONEB) 0.5-2.5 (3) MG/3ML SOLN nebulizer solution Take 1 vial by nebulization every 4 hours as needed for Shortness of Breath   Yes Historical Provider, MD   isosorbide mononitrate (IMDUR) 60 MG extended release tablet Take 60 mg by mouth daily   Yes Historical Provider, MD   montelukast (SINGULAIR) 10 MG tablet Take 10 mg by mouth nightly   Yes Historical Provider, MD   mirabegron (MYRBETRIQ) 50 MG TB24 Take 50 mg by mouth at bedtime   Yes Historical Provider, MD   simvastatin (ZOCOR) 20 MG tablet Take 20 mg by mouth nightly   Yes Historical Provider, MD   tiotropium [facility] RESPIMAT) 1.25 MCG/ACT AERS inhaler Inhale 2 puffs into the lungs daily   Yes Historical Provider, MD   traZODone (DESYREL) 100 MG tablet Take 100 mg by mouth nightly   Yes Historical Provider, MD   vitamin D (CHOLECALCIFEROL) 25 MCG (1000 UT) TABS tablet Take 1,000 Units by mouth daily   Yes Historical Provider, MD   bumetanide (BUMEX) 1 MG tablet Take 1 tablet by mouth daily 10/25/21   Jovanna Stringer MD   spironolactone (ALDACTONE) 25 MG tablet Take 1 tablet by mouth daily 9/15/21   Jovanna Stringer MD   carvedilol (COREG) 25 MG tablet Take 1 tablet by mouth 2 times daily (with meals) 9/15/21   Jovanna Stringer MD   hydrALAZINE (APRESOLINE) 50 MG tablet Take 50 mg by mouth 3 times daily    Historical Provider, MD   pantoprazole (PROTONIX) 40 MG tablet Take 40 mg by mouth daily  6/21/21   Historical Provider, MD   metoclopramide (REGLAN) 5 MG tablet Take 5 mg by mouth 3 times daily as needed  5/5/21   Sampson Hayes DO   docusate sodium (COLACE) 100 MG capsule Take 200 mg by mouth at bedtime  3/11/21   Historical Provider, MD       Current Medications:    Current Facility-Administered Medications: hydrocortisone 2.5 % cream, , Topical, BID  insulin lispro (HUMALOG) injection vial 4 Units, 4 Units, SubCUTAneous, TID WC  bumetanide (BUMEX) tablet 2 mg, 2 mg, Oral, Daily  oxybutynin (DITROPAN) tablet 5 mg, 5 mg, Oral, BID  guaiFENesin (ROBITUSSIN) 100 MG/5ML oral solution 200 mg, 200 mg, Oral, Q6H PRN  insulin glargine-yfgn (SEMGLEE-YFGN) injection vial 25 Units, 25 Units, SubCUTAneous, Nightly  lidocaine 4 % external patch 1 patch, 1 patch, TransDERmal, Daily  predniSONE (DELTASONE) tablet 40 mg, 40 mg, Oral, Daily  doxycycline hyclate (VIBRAMYCIN) capsule 100 mg, 100 mg, Oral, 2 times per day  HYDROcodone-acetaminophen (NORCO) 5-325 MG per tablet 1 tablet, 1 tablet, Oral, Q6H PRN  nicotine (NICODERM CQ) 14 MG/24HR 1 patch, 1 patch, TransDERmal, Daily  perflutren lipid microspheres (DEFINITY) injection 1.65 mg, 1.5 mL, IntraVENous, ONCE PRN  labetalol (NORMODYNE;TRANDATE) injection 10 mg, 10 mg, IntraVENous, Once  Arformoterol Tartrate (BROVANA) nebulizer solution 15 mcg, 15 mcg, Nebulization, BID PRN  aspirin chewable tablet 81 mg, 81 mg, Oral, Daily  carvedilol (COREG) tablet 25 mg, 25 mg, Oral, BID WC  cetirizine (ZYRTEC) tablet 10 mg, 10 mg, Oral, Daily  DULoxetine (CYMBALTA) extended release capsule 60 mg, 60 mg, Oral, BID  famotidine (PEPCID) tablet 20 mg, 20 mg, Oral, Dinner  fluticasone (FLONASE) 50 MCG/ACT nasal spray 2 spray, 2 spray, Nasal, Daily  gabapentin (NEURONTIN) capsule 100 mg, 100 mg, Oral, BID  hydrALAZINE (APRESOLINE) tablet 50 mg, 50 mg, Oral, TID  ipratropium-albuterol (DUONEB) nebulizer solution 3 mL, 1 vial, Nebulization, Q4H WA  isosorbide mononitrate (IMDUR) extended release tablet 60 mg, 60 mg, Oral, Daily  mirabegron (MYRBETRIQ) extended release tablet 50 mg, 50 mg, Oral, Nightly  montelukast (SINGULAIR) tablet 10 mg, 10 mg, Oral, Nightly  pantoprazole (PROTONIX) tablet 40 mg, 40 mg, Oral, Daily  [Held by provider] sacubitril-valsartan (ENTRESTO) 49-51 MG per tablet 1 tablet, 1 tablet, Oral, BID  atorvastatin (LIPITOR) tablet 10 mg, 10 mg, Oral, Nightly  [Held by provider] spironolactone (ALDACTONE) tablet 25 mg, 25 mg, Oral, Daily  traZODone (DESYREL) tablet 100 mg, 100 mg, Oral, Nightly  sodium chloride flush 0.9 % injection 5-40 mL, 5-40 mL, IntraVENous, 2 times per day  sodium chloride flush 0.9 % injection 5-40 mL, 5-40 mL, IntraVENous, PRN  0.9 % sodium chloride infusion, , IntraVENous, PRN  polyethylene glycol (GLYCOLAX) packet 17 g, 17 g, Oral, Daily PRN  acetaminophen (TYLENOL) tablet 650 mg, 650 mg, Oral, Q6H PRN **OR** acetaminophen (TYLENOL) suppository 650 mg, 650 mg, Rectal, Q6H PRN  enoxaparin (LOVENOX) injection 40 mg, 40 mg, SubCUTAneous, Daily  insulin lispro (HUMALOG) injection vial 0-12 Units, 0-12 Units, SubCUTAneous, TID WC  insulin lispro (HUMALOG) injection vial 0-6 Units, 0-6 Units, SubCUTAneous, Nightly  glucose (GLUTOSE) 40 % oral gel 15 g, 15 g, Oral, PRN  dextrose 50 % IV solution, 12.5 g, IntraVENous, PRN  glucagon (rDNA) injection 1 mg, 1 mg, IntraMUSCular, PRN  dextrose 5 % solution, 100 mL/hr, IntraVENous, PRN  magnesium sulfate 2000 mg in 50 mL IVPB premix, 2,000 mg, IntraVENous, PRN  potassium chloride (KLOR-CON M) extended release tablet 40 mEq, 40 mEq, Oral, PRN **OR** potassium bicarb-citric acid (EFFER-K) effervescent tablet 40 mEq, 40 mEq, Oral, PRN **OR** potassium chloride 10 mEq/100 mL IVPB (Peripheral Line), 10 mEq, IntraVENous, PRN  [Held by provider] bumetanide (BUMEX) injection 1 mg, 1 mg, IntraVENous, BID  hydrALAZINE (APRESOLINE) injection 10 mg, 10 mg, IntraVENous, Q4H PRN    Allergies:  Latex, Bee venom, Dilaudid [hydromorphone hcl], Dye [iodides], Percocet [oxycodone-acetaminophen], Keflex [cephalexin], Lasix [furosemide], Levaquin [levofloxacin in d5w], Lipitor, Lyrica [pregabalin], Morphine, Naproxen, Nefazodone, Norvasc [amlodipine], Oxycodone-acetaminophen, Shellfish-derived products, and Trazodone and nefazodone    Social History:    Current smoker: 0.25 PPD x 40 years   Denies alcohol  Marijuana use: 1-2 times per month. Resides alone but has family near by to assist with getting food and taking her to her appointments. Family History: Noncontributory due to advanced age. REVIEW OF SYSTEMS:     · Constitutional: + fatigue, +fevers +chills. Denies  night sweats  · Eyes: Denies visual changes or drainage  · ENT: Denies headaches or hearing loss. No mouth sores or sore throat. No epistaxis   · Cardiovascular: See HPI. · Respiratory: +SAUNDERS, + cough, +orthopnea. Denies PND. No hemoptysis   · Gastrointestinal: Denies hematemesis or anorexia. No hematochezia or melena    · Genitourinary: Denies urgency, dysuria or hematuria.   · Musculoskeletal: Denies gait disturbance, weakness or joint complaints  · Integumentary: Denies rash, hives or pruritis   · Neurological: Denies dizziness, headaches or seizures. No numbness or tingling  · Psychiatric: Denies anxiety or depression. · Endocrine: Denies temperature intolerance. No recent weight change. .  · Hematologic/Lymphatic: Denies abnormal bruising or bleeding. No swollen lymph nodes    PHYSICAL EXAM:   BP (!) 150/70   Pulse 65   Temp 97.3 °F (36.3 °C)   Resp 16   Ht 5' 2\" (1.575 m)   Wt 212 lb 12.8 oz (96.5 kg)   LMP 1990   SpO2 100%   BMI 38.92 kg/m²   CONST:  Well developed, well nourished middle aged AA female who appears of stated age. Awake, alert and cooperative. No apparent distress. Dyspenic during conversation. HEENT:   Head- Normocephalic, atraumatic   Eyes- Conjunctivae pink, anicteric  Throat- Oral mucosa pink and moist  Neck-  Unable to assess   CHEST: Chest symmetrical and non-tender to palpation. No accessory muscle use or intercostal retractions  RESPIRATORY: Lung sounds - Coarse rhonchi with expiratory wheeze. On Supplemental O2.   CARDIOVASCULAR:     Heart Inspection- shows no noted pulsations  Heart Palpation- no heaves or thrills; PMI is non-displaced   Heart Ausculation- Regular rate and rhythm, no murmur. No s3, s4 or rub   PV: No lower extremity edema. No varicosities. Pedal pulses palpable, no clubbing or cyanosis   ABDOMEN: Soft, obese,  non-tender to light palpation. Bowel sounds present. No palpable masses no organomegaly; no abdominal bruit  MS: Good muscle strength and tone. No atrophy or abnormal movements. : Deferred  SKIN: Warm and dry no statis dermatitis or ulcers   NEURO / PSYCH: Oriented to person, place and time. Speech clear and appropriate. Follows all commands. Flat affect.      DATA:    EC2022 NSR, cRBBB, rate 94 bpm.  Tele strips: SR, HR 60 bpm.   Diagnostic:      Intake/Output Summary (Last 24 hours) at 2022 1504  Last data filed at 2022 1453  Gross per 24 hour   Intake --   Output 2250 ml   Net -2250 ml       Labs:   CBC: Recent Labs     04/13/22  0540 04/14/22  0538   WBC 7.6 7.9   HGB 12.0 13.2   HCT 38.2 41.7    215     BMP:   Recent Labs     04/13/22  0540 04/14/22  0538    136   K 5.1* 4.5   CO2 25 31*   BUN 38* 38*   CREATININE 1.6* 1.5*   LABGLOM 39 42   CALCIUM 8.4* 8.8     Mag:   Recent Labs     04/13/22  0540 04/14/22  0538   MG 2.2 1.8     Phos: No results for input(s): PHOS in the last 72 hours. TFT:   Lab Results   Component Value Date    TSH 0.646 02/05/2021    E4LECCJ 7.9 11/07/2017    T4FREE 1.34 12/01/2016      HgA1c:   Lab Results   Component Value Date    LABA1C 6.9 (H) 07/16/2021     No results found for: EAG  proBNP:   No results for input(s): PROBNP in the last 72 hours. CARDIAC ENZYMES:  Recent Labs     04/11/22 2133   TROPHS 10*     FASTING LIPID PANEL:  Lab Results   Component Value Date    CHOL 129 04/12/2022    HDL 32 04/12/2022    LDLCALC 74 04/12/2022    TRIG 114 04/12/2022     LIVER PROFILE:  No results for input(s): AST, ALT, LABALBU in the last 72 hours. CXR: 4/11/2022:  Impression   Bilateral airspace disease likely related to cardiogenic edema.  See above. Transthoracic echocardiogram April 13, 2022,  Summary   Technically difficult examination. Severe concentric left ventricular hypertrophy. Left ventricular diastolic filling is elevated . Ejection fraction is visually estimated at 50 to 55%. Normal right ventricular size and function. Mild mitral regurgitation is present. Mild tricuspid regurgitation. Assessment/Plan:   1. Acute hypoxic respiratory failure: On supplemental oxygen. History of chronic bronchitis secondary to tobacco use and COPD exacerbation. Management per pulmonology and primary service    2. Acute on chronic heart failure with improved ejection fraction  · LVEF 35-45% on TTE 1/2017, LVEF 65% on TTE 6/5/2018, LVEF 35-60% on TTE 6/11/2021.   Awaiting echocardiogram to guide therapy  Follow low-salt diet and maintain good blood pressure control  Hold Entresto and spironolactone and continue on Imdur and hydralazine and uptitrate for optimal blood pressure control. 3. ACC stage C/NYHA class II-III  As mentioned above  4. Mildly Hypervolemic   As mentioned above  5. History of CAD status post CABG in 2011 (LIMA-LAD, SVG-OM1). Continue beta-blocker, statin and aspirin  Lexiscan myocardial perfusion stress test on Saturday. 6. Borderline elevated hs-cTnT (12>>10): pattern not consistent with ACS. CP free with no EKG changes. Awaiting echocardiogram to guide therapy    7. Obesity: BMI 38.59 kg/m2  8. Acute on chronic kidney disease  Avoid nephrotoxic agents and consider nephrology consultation. 9. DAYAN: On trilogy (unable to tolerate CPAP)  10. Hypertension/LVH: /66 - 132/59   11. Sinus bradycardia   12. Hyperlipidemia: intolerant to Lipitor / Tolerates Zocor 20 mg QD. 13. Type 2 diabetes mellitus  14. Tobacco and marijuana use  Smoking cessation was recommended  15. PAD status post left popliteal and tibial atherectomy/angioplasty  16. Chronic right bundle branch block  17. History of Covid-19 infection (4/2021)  18. Hyperkalemia   19.  Bladder incontinent  Management per primary service

## 2022-04-14 NOTE — PROGRESS NOTES
Occupational Therapy  OT BEDSIDE TREATMENT NOTE   [address] [facility] [address] [facility]  [address] [place] [address]       Date:2022  Patient Name: Fransisco Lesch  MRN: [id number]  : 1955  Room: 91[facility]-     Per OT Eval:    Evaluating OT: Jose A Vargas OTR/L #[id number]      Referring Provider: TONY Carr  Specific Provider Orders/Date: OT eval and treat 22     Diagnosis: Respiratory distress [R06.03]  CHF (congestive heart failure), NYHA class I, acute on chronic, combined ([address] [I50.43]   Pt admitted to hospital with SOB.    Pertinent Medical History:  has a past medical history of Asthma, Atherosclerosis of native artery of left leg with rest pain ([address] CAD (coronary artery disease), Cellulitis and abscess of trunk, Cerebellar infarct (HCC), Chronic back pain, Chronic kidney disease, Chronic systolic congestive heart failure ([address] Chronic venous insufficiency, COPD (chronic obstructive pulmonary disease) ([address] COVID-19, Depression, Diabetes mellitus ([address] Diabetic neuropathy ([address] Diverticulosis, Fatty liver, Glaucoma, open angle, Hepatic encephalopathy ([address] Hiatal hernia, History of blood transfusion, Hyperlipidemia, Hyperplastic colon polyp, Hypertension, Incontinence, Liver mass, Morbid obesity ([address] Movement disorder, Myocardial infarction ([address] O2 dependent, Osteoarthritis, generalized, Pain in both lower legs, Peripheral vascular disease ([address] Pneumonia, Pulmonary edema, PVD (peripheral vascular disease) with claudication ([address] Sleep apnea, Status post peripheral artery angioplasty, Tobacco abuse, Tobacco abuse, Tubular adenoma polyp of rectum, Urinary tract infection due to ESBL Klebsiella, and Ventral hernia.      Precautions:  Fall Risk, O2,      Assessment of current deficits    [x]? Functional mobility          [x]? ADLs           [x]? Strength                  []?Cognition    [x]? Functional transfers        [x]?  IADLs ambulated with rollator (Pt reports [facility] aides 7 days /week for 5 hours per day; they assist prn)   Driving: no   Occupation: na     Pain Level: R flank pain, nsg aware, did not rate      Cognition: A&O: 4/4; Follows 2 step directions, very pleasant & cooperative             Memory:  good             Sequencing:  good             Problem solving:  fair             Judgement/safety:  fair               Functional Assessment:  AM-PAC Daily Activity Raw Score: 19/24    Initial Eval Status  Date: 4/12/22 Treatment Status  Date:  4/14/22 STGs = LTGs  Time frame: 10-14 days   Feeding Independent        Grooming Stand by Assist      seated Set up  Seated in chair due to small bathroom, demonstrated ability to stand for ~5 minutes   Modified [place]    UB Dressing Stand by Assist   Set up  [facility] seated  Modified [place]    LB Dressing Minimal Assist  SBA  After instruction on use of AE, good understanding & use of sock aide & reacher  Modified [place]    Bathing Minimal Assist  SBA  Pt sponge bathing upon arrival, almost completed  Modified [place]    Toileting Minimal Assist   NT Modified [place]    Bed Mobility  Supine to sit: Stand by Assist   Sit to supine: Stand by Assist   NT  Seated in chair upon arrival  Supine to sit: Modified [place]   Sit to supine: Modified [place]    Functional Transfers Stand by Assist  SBA Modified [place]    Functional Mobility Stand by Assist  SBA  Walker, household distances  Modified [place]    Balance Sitting:     Static:  Sup    Dynamic:SBA  Standing: SBA  Sitting: Indep  Standing: SBA  With walker      Activity Tolerance F-     Limited due to fatigue and SOB Fair  86-90% on RA  During bathing, pt reports nsg removed while pt bathing  97% on 5L O2 during mobility   F+   Visual/  Perceptual wfl                       Education:  Pt was educated through out treatment regarding proper technique & safety with functional transfers & mobility, energy conservation techniques & use of AE along with modified ADL techniques to ease tasks, improve safety & prevent falls to return home safely. Comments: Upon arrival pt was sponge bathing seated in chair & agreeable for therapy. At end of session pt was seated in chair, all lines and tubes intact, call light within reach. · Pt has made Good progress towards set goals. · Continue with current plan of care      Treatment Time In: 11:30          Treatment Time Out: 12:00           Treatment Charges: Mins Units   Ther Ex  [id number]     Manual Therapy [id number]     Thera Activities [id number] 15 1   ADL/Home Mgt [id number] 15 1   Neuro Re-ed 13082     Group Therapy      Orthotic manage/training  [id number]     Non-Billable Time     Total Timed Treatment 30 2       Holley EDGE  [address] [place] [address] Drive, [address] [place] [address]

## 2022-04-14 NOTE — PROGRESS NOTES
Pt is requesting cough medicine. Left a voicemail with Dr. Nevaeh Brown. Awaiting call back or orders.

## 2022-04-14 NOTE — PROGRESS NOTES
[facility]  Nephrology Progress Note    Patient's Name: Megan Pride      History of Present Illness from 4/12 Consult Note:    Sherron Meyer is a 78 y.o. female with a past medical history of peripheral vascular disease, COPD, Covid, diverticulosis, coronary artery disease, hypertension, and hyperlipidemia. She presented to the ED on 4/11, per the H&P for shortness of breath x4 days. Vital signs at presentation to the ED include temperature 98.3, respirations 18, pulse 143, /169, and she was 95% on the CPAP. Lab data at presentation to the ED include CO2 21, creatinine 1.2, calcium 8.2, and albumin 3.3. Chest x-ray showed \"bilateral airspace disease. \"  We were consulted to see the patient for mild ALEC on CKD. Her baseline creatinine from July 2021 was 1.1-1.2. At present, patient was seen and examined. She explained to me that she came in for shortness of breath. She currently denies any chest pain. However, she explained that she continues to have intermittent shortness of breath. She denies any nausea or vomiting. She reports that she was having abdominal pain. She also explained that she has been experiencing weakness and fatigue. Prior to admission the patient explained that she was drinking okay, but did not have a good appetite. \"    Subjective:    4/14: Patient was seen and examined. She is currently sitting up in a chair. She reports that she had a cough overnight. She also explained that she is experiencing back pain and some nausea. She denies any chest pain.     PMH:    Past Medical History:   Diagnosis Date    Asthma     Atherosclerosis of native artery of left leg with rest pain ([facility] [address] 11/09/2018    CAD (coronary artery disease) 2011    Cellulitis and abscess of trunk 04/14/2015    Cerebellar infarct ([facility] [facility] 04/03/2019    Remote, rt. cerebellum, head CT scan, 1/9/19    Chronic back pain     Chronic kidney disease     Chronic systolic congestive heart failure ([facility] [address] 10/04/2013    Chronic venous insufficiency 10/11/2017    COPD (chronic obstructive pulmonary disease) ([facility]     COVID-19     Depression     Diabetes mellitus ([address]     Diabetic neuropathy ([facility] [address]     Diverticulosis     Fatty liver 01/08/2016    per US    Glaucoma, open angle 02/01/2016    Mild-OU    Hepatic encephalopathy ([address] 02/07/2016    resolved    Hiatal hernia     History of blood transfusion     Hyperlipidemia     Hyperplastic colon polyp     Hypertension     Incontinence     Liver mass     Morbid obesity ([address]     Movement disorder     Myocardial infarction ([address] 2011    O2 dependent 09/16/2021    with bipap 3 liters    Osteoarthritis, generalized     Pain in both lower legs 10/11/2017    Peripheral vascular disease ([address]     Pneumonia 01/26/2014    Pulmonary edema     resolved    PVD (peripheral vascular disease) with claudication ([address] 08/30/2017    Sleep apnea     uses BI PAP    Status post peripheral artery angioplasty 10/31/2019    Tobacco abuse     Tobacco abuse 10/11/2017    Tubular adenoma polyp of rectum     Urinary tract infection due to ESBL Klebsiella 03/08/2017    Ventral hernia      Patient Active Problem List   Diagnosis    Severe obesity (BMI 35.0-35.9 with comorbidity) ([facility]    Hyperlipidemia    DAYAN and COPD overlap syndrome (HCC)    Vitamin D insufficiency    Chronic combined systolic and diastolic heart failure (HCC)    GERD (gastroesophageal reflux disease)    Major depressive disorder, recurrent episode, mild ([address]    Diabetic polyneuropathy associated with type 2 diabetes mellitus (HCC)    Chronic passive hepatic congestion    Mixed incontinence urge and stress    Chronic back pain - d/t muscle spasm    Glaucoma, open angle    Elevated CA 19-9 level    Lumbar stenosis    Spondylosis 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    Lumbar disc herniation    Asymmetric septal hypertrophy (HCC)    Non-compliance    Hiatal hernia    Melanosis coli    Coronary artery disease involving native coronary artery of native heart without angina pectoris    DM2 (diabetes mellitus, type 2) ([facility]    Cigarette smoker    Marijuana use, smoked    Ischemic cardiomyopathy    Encounter for tobacco use cessation counseling    PVD (peripheral vascular disease) with claudication ([facility])    Chronic venous insufficiency    History of non-ST elevation myocardial infarction (NSTEMI)    Prolonged Q-T interval on ECG    History of stroke    COPD exacerbation ([facility])    Chronic respiratory failure with hypoxia and hypercapnia ([facility])    COPD (chronic obstructive pulmonary disease) ([facility])    Status post peripheral artery angioplasty    Uncontrolled hypertension    O2 dependent    Respiratory distress    Acute on chronic diastolic (congestive) heart failure ([facility])    CKD (chronic kidney disease) stage 3, GFR 30-59 ml/min ([facility])    Hypertensive emergency    Acute kidney injury superimposed on CKD ([facility])     Meds:     bumetanide  2 mg Oral Daily    oxybutynin  5 mg Oral BID    insulin glargine  25 Units SubCUTAneous Nightly    lidocaine  1 patch TransDERmal Daily    predniSONE  40 mg Oral Daily    doxycycline hyclate  100 mg Oral 2 times per day    nicotine  1 patch TransDERmal Daily    labetalol  10 mg IntraVENous Once    aspirin  81 mg Oral Daily    carvedilol  25 mg Oral BID WC    cetirizine  10 mg Oral Daily    DULoxetine  60 mg Oral BID    famotidine  20 mg Oral Dinner    fluticasone  2 spray Nasal Daily    gabapentin  100 mg Oral BID    hydrALAZINE  50 mg Oral TID    ipratropium-albuterol  1 vial Nebulization Q4H WA    isosorbide mononitrate  60 mg Oral Daily    mirabegron  50 mg Oral Nightly    montelukast  10 mg Oral Nightly    pantoprazole  40 mg Oral Daily    [Held by provider] sacubitril-valsartan  1 tablet Oral BID    atorvastatin  10 mg Oral Nightly    [Held by provider] spironolactone  25 mg Oral Daily    traZODone  100 mg Oral Nightly    sodium chloride flush  5-40 mL IntraVENous 2 times per day    enoxaparin  40 mg SubCUTAneous Daily    insulin lispro  0-12 Units SubCUTAneous TID WC    insulin lispro  0-6 Units SubCUTAneous Nightly    [Held by provider] bumetanide  1 mg IntraVENous BID      sodium chloride      dextrose       Meds prn:     guaiFENesin, HYDROcodone 5 mg - acetaminophen, perflutren lipid microspheres, Arformoterol Tartrate, sodium chloride flush, sodium chloride, polyethylene glycol, acetaminophen **OR** acetaminophen, glucose, dextrose, glucagon (rDNA), dextrose, magnesium sulfate, potassium chloride **OR** potassium alternative oral replacement **OR** potassium chloride, hydrALAZINE    Meds prior to admission:     No current facility-administered medications on file prior to encounter. Current Outpatient Medications on File Prior to Encounter   Medication Sig Dispense Refill    acetaminophen (TYLENOL) 325 MG tablet Take 650 mg by mouth every 6 hours as needed for Pain      aspirin 81 MG chewable tablet Take 81 mg by mouth daily      Arformoterol Tartrate (BROVANA) 15 MCG/2ML NEBU Take 1 ampule by nebulization 2 times daily as needed      azelastine (ASTELIN) 0.1 % nasal spray 1 spray by Nasal route 2 times daily as needed for Rhinitis      cetirizine (ZYRTEC) 10 MG tablet Take 10 mg by mouth daily      DULoxetine (CYMBALTA) 60 MG extended release capsule Take 60 mg by mouth 2 times daily      sacubitril-valsartan (ENTRESTO) 49-51 MG per tablet Take 1 tablet by mouth 2 times daily      famotidine (PEPCID) 40 MG tablet Take 40 mg by mouth Daily with supper      fluticasone (FLONASE) 50 MCG/ACT nasal spray 2 sprays by Nasal route daily      gabapentin (NEURONTIN) 100 MG capsule Take 100 mg by mouth 2 times daily.       Insulin Degludec (TRESIBA FLEXTOUCH) 200 UNIT/ML SOPN Inject 50 Units into the skin at bedtime      insulin lispro, 1 Unit Dial, (HUMALOG KWIKPEN) 100 UNIT/ML SOPN Inject 0-6 Units into the skin 3 times daily (before meals) *Per Sliding Scale*      ipratropium-albuterol (DUONEB) 0.5-2.5 (3) MG/3ML SOLN nebulizer solution Take 1 vial by nebulization every 4 hours as needed for Shortness of Breath      isosorbide mononitrate (IMDUR) 60 MG extended release tablet Take 60 mg by mouth daily      montelukast (SINGULAIR) 10 MG tablet Take 10 mg by mouth nightly      mirabegron (MYRBETRIQ) 50 MG TB24 Take 50 mg by mouth at bedtime      simvastatin (ZOCOR) 20 MG tablet Take 20 mg by mouth nightly      tiotropium (SPIRIVA RESPIMAT) 1.25 MCG/ACT AERS inhaler Inhale 2 puffs into the lungs daily      traZODone (DESYREL) 100 MG tablet Take 100 mg by mouth nightly      vitamin D (CHOLECALCIFEROL) 25 MCG (1000 UT) TABS tablet Take 1,000 Units by mouth daily      bumetanide (BUMEX) 1 MG tablet Take 1 tablet by mouth daily 30 tablet 11    spironolactone (ALDACTONE) 25 MG tablet Take 1 tablet by mouth daily 90 tablet 3    carvedilol (COREG) 25 MG tablet Take 1 tablet by mouth 2 times daily (with meals) 180 tablet 3    hydrALAZINE (APRESOLINE) 50 MG tablet Take 50 mg by mouth 3 times daily      pantoprazole (PROTONIX) 40 MG tablet Take 40 mg by mouth daily       metoclopramide (REGLAN) 5 MG tablet Take 5 mg by mouth 3 times daily as needed  120 tablet 3    docusate sodium (COLACE) 100 MG capsule Take 200 mg by mouth at bedtime        Allergies:    Latex, Bee venom, Dilaudid [hydromorphone hcl], Dye [iodides], Percocet [oxycodone-acetaminophen], Keflex [cephalexin], Lasix [furosemide], Levaquin [levofloxacin in d5w], Lipitor, Lyrica [pregabalin], Morphine, Naproxen, Nefazodone, Norvasc [amlodipine], Oxycodone-acetaminophen, Shellfish-derived products, and Trazodone and nefazodone    Social History:     reports that she has been smoking cigarettes. She started smoking about 47 years ago. She has a 10.00 pack-year smoking history.  She has never used smokeless tobacco. She reports current alcohol use. She reports previous drug use. Drug: Marijuana Charlene Aver). Family History:         Problem Relation Age of Onset    Cancer Mother     Asthma Father      Physical Exam:    Patient Vitals for the past 24 hrs:   BP Temp Temp src Pulse Resp SpO2 Weight   04/14/22 0446 -- -- -- -- -- -- 212 lb 12.8 oz (96.5 kg)   04/14/22 0338 135/64 96.1 °F (35.6 °C) Temporal 52 18 -- --   04/13/22 2211 139/81 96.8 °F (36 °C) Temporal 62 18 98 % --   04/13/22 2038 -- -- -- -- 18 98 % --   04/13/22 2000 (!) 143/63 96.8 °F (36 °C) Temporal 60 16 98 % --   04/13/22 1654 -- -- -- -- 16 100 % --   04/13/22 1621 (!) 179/75 98 °F (36.7 °C) Temporal 61 18 98 % --   04/13/22 1227 -- -- -- -- -- 98 % --   04/13/22 1214 118/75 97.8 °F (36.6 °C) Temporal 61 18 97 % --   04/13/22 0853 -- -- -- -- -- 98 % --   04/13/22 0830 108/74 97.5 °F (36.4 °C) Temporal 57 18 98 % --       Intake/Output Summary (Last 24 hours) at 4/14/2022 5799  Last data filed at 4/13/2022 2000  Gross per 24 hour   Intake 240 ml   Output 1400 ml   Net -1160 ml     General: Awake, alert, no acute distress  Neck: No JVD noted  Lungs:  Crackles bilaterally upper, diminished to the bases bilaterally. Unlabored  CV: Regular rate and rhythm. No rub  Abd:  Rounded, soft, nontender, nondistended. Active bowel sounds  Skin: Warm and dry.   No rash on exposed extremities  Ext: No edema   Neuro: Awake, answers questions appropriately    Data:    Recent Labs     04/12/22  0541 04/13/22  0540 04/14/22  0538   WBC 8.1 7.6 7.9   HGB 12.0 12.0 13.2   HCT 38.4 38.2 41.7   .3* 101.3* 102.2*    205 215     Recent Labs     04/12/22  0541 04/13/22  0540 04/14/22  0538    133 136   K 5.1* 5.1* 4.5   CL 98 97* 96*   CO2 27 25 31*   CREATININE 1.6* 1.6* 1.5*   BUN 33* 38* 38*   LABGLOM 39 39 42   GLUCOSE 199* 314* 221*   CALCIUM 8.5* 8.4* 8.8   MG 2.3 2.2 1.8     Vit D, 25-Hydroxy   Date Value Ref Range Status   02/05/2021 29 (L) 30 - 100 ng/mL Final Comment:     <20 ng/mL. ........... Scott Quintana Deficient  20-30 ng/mL. ......... Scott Quintana Insufficient   ng/mL. ........ Scott Quintana Sufficient  >100 ng/mL. .......... Scott Quintana Toxic       No results found for: PTH    Recent Labs     04/11/22  1122   ALT 15   AST 18   ALKPHOS 71   BILITOT 0.5     Recent Labs     04/11/22  1122   LABALBU 3.3*     Ferritin   Date Value Ref Range Status   11/02/2017 172 ng/mL Final     Comment:     FERRITIN Reference Ranges:  Adult Males   20 - 60 yrars:    30 - 400 ng/mL  Adult females 17 - 60 years:    13 - 150 ng/mL  Adults greater than 60 years:   no established reference range  Pediatrics:                     no established reference range       Iron   Date Value Ref Range Status   11/02/2017 51 37 - 145 mcg/dL Final     TIBC   Date Value Ref Range Status   11/02/2017 266 250 - 450 mcg/dL Final     Vitamin B-12   Date Value Ref Range Status   06/16/2020 1943 (H) 211 - 946 pg/mL Final     Folate   Date Value Ref Range Status   06/16/2020 >20.0 4.8 - 24.2 ng/mL Final     Lab Results   Component Value Date    COLORU Yellow 04/12/2022    NITRU Negative 04/12/2022    GLUCOSEU Negative 04/12/2022    GLUCOSEU NEGATIVE 04/25/2011    KETUA Negative 04/12/2022    UROBILINOGEN 0.2 04/12/2022    BILIRUBINUR Negative 04/12/2022    BILIRUBINUR negative 03/03/2020    BILIRUBINUR NEGATIVE 04/25/2011     No results found for: RAZIA, CREURRAN, MACREATRATIO, OSMOU    No components found for: URIC    No results found for: LIPIDPAN    Assessment and Plan:    1. ALEC stage I on CKD 3 a  Likely prerenal in setting of decreased effective renal perfusion given the use of bumex/ spironolactone, with entresto contributing   baseline creatinine from July 2021 was 1.1-1.2  Continue to hold Entresto, spironolactone, and Bumex  Cr peaked at 1.6 --> 1.5 today  FeNa 1%, FeUrea 29.8%  Strict I&O, daily weights  Avoid nephrotoxins/NSAIDs  Monitor labs  Follow    2.   HFpEF  proBNP from 4/11 was 2108  Echo 6/2021 showed EF 55-60%, left ventricular hypertrophy severe  Chest x-ray showed \"bilateral airspace disease\"  Entresto on hold  spironolactone 25 mg oral daily on hold  On Bumex 2 mg oral daily  Strict I&O, daily weights  Cardiology also following    3. Hypertension,   BP goal<130/80  BP nearing goal  Follow on current regimen    4. Type 2 diabetes mellitus  Hemoglobin A1c from 7/2021 was 6.9%  On insulin lispro and insulin glargine  Management per primary    5. Hyperkalemia  likely in setting of ALEC/CKD/Entresto  K+ 4.5 today  Entresto/spironolactone on hold  Low potassium diet  Follow    Maru Pearson, APRN - CNP     Patient seen and examined all key components of the physical performed independently , case discussed with NP, all pertinent labs and radiologic tests personally reviewed agree with above.       Codey Smith MD

## 2022-04-14 NOTE — PROGRESS NOTES
Physical Therapy  Treatment Note    Name: Adelaida Chow  : 1955  MRN: [id number]      Date of Service: 2022    Evaluating PT:  Leola Murraybert PT, DPT [id number]     Room #:  3728/2912-J  Diagnosis:  Respiratory distress [R06.03]  CHF (congestive heart failure), NYHA class I, acute on chronic, combined ([address] [I50.43]  Reason for admission: SOB   Precautions:  Falls, O2  Procedure/Surgery:  None   Equipment Recommendations:  Return to use of rollator     SUBJECTIVE:  Pt lives alone in a 1 story home with 2 VAZQUEZ 1 rail. Pt ambulated with rollator PTA. OBJECTIVE:   Initial Evaluation  Date:  Treatment Date: 22   Short Term/ Long Term   Goals   AM-PAC 6 Clicks 97/41 45/75    Was pt agreeable to Eval/treatment? Yes  Yes    Does pt have pain? 10/10 back and LEs Back pain    Bed Mobility  Rolling: NT  Supine to sit: SBA  Sit to supine: SBA  Scooting: SBA Rolling: NT  Supine to sit: NT  Sit to supine: NT  Scooting: NT Independent    Transfers Sit to stand: SBA  Stand to sit: SBA  Stand pivot: NT Sit to stand: SBA  Stand to sit: SBA  Stand pivot: SBA with [facility] Independent    Ambulation    20 feet with [facility] SBA  -pt limited distance 40 feet x4 with WW with SBA >200 feet with AAD Mod I    Stair negotiation: ascended and descended  NT NT TBD     Pt is A & O x: 4 to person, place, month/year, and situation. Sensation: Pt denies numbness and tingling of extremities. Edema: Unremarkable. Therapeutic Exercises:   5x STS from chair    Patient education  Pt educated on PT role in acute care setting. Patient response to education:   Pt verbalized understanding Pt demonstrated skill Pt requires further education in this area   Yes NA No     ASSESSMENT:    Comments:    Pt was in chair upon room entry; agreeable to PT treatment. Pt was on RA and O2 sat ranged from 86-90% at rest. Pt was placed back on 5 L O2/min and saturation levels recovered to 97%.  Pt ambulated x4 reps with [facility]. Gait was slow but fairly steady. No LOB occurred. Pt was mildly SOB with activity. Pt completed 5x STS from chair. All questions and concerns were addressed. Pt was left in bed with all needs met at conclusion of session. Treatment:  Patient practiced and was instructed in the following treatment:     Therapeutic activities:  o Transfers: Pt was cued for hand placement during sit <> stand transfers. Pt completed x6 transfers from chair. o Ambulation: Pt ambulated x4 reps with [employer]. Pt was cued for [employer] and line safety. o Vitals and symptoms were closely monitored throughout session.  Therapeutic exercise:  o Pt completed all therapeutic exercises noted above. PLAN:    Patient is making Good progress towards established goals. Will continue with current POC.       Time in: 1135  Time out: 1200    Total Treatment Time 25 minutes     CPT codes:  [] Gait training 78611 0 minutes  [] Manual therapy 19145 0 minutes  [x] Therapeutic activities 24190 25 minutes  [] Therapeutic exercises 91727 0 minutes  [] Neuromuscular reeducation 77482 0 minutes      Dani Gibson PT, DPT   [id number]

## 2022-04-14 NOTE — PROGRESS NOTES
Chief Complaint:  Chief Complaint   Patient presents with    Respiratory Distress     increased SOB the last 4 days. was 90% on trilogy oxygen concentrator at home. EMS placed on CPAP, arrived at 95%, hx of COPD      Acute on chronic diastolic (congestive) heart failure (HCC)     Subjective:    Complains of persistent dyspnea. Objective:    BP (!) 122/55   Pulse 58   Temp 97.2 °F (36.2 °C)   Resp 16   Ht 5' 2\" (1.575 m)   Wt 212 lb 12.8 oz (96.5 kg)   LMP 01/01/1990   SpO2 100%   BMI 38.92 kg/m²     Current medications that patient is taking have been reviewed. General appearance: NAD, conversant, obese, very chronically ill appearing but comfortable/nontoxic appearing  HEENT: AT/NC, MMM  Neck: FROM, supple  Lungs: Dull, b/l rales, lots of wheezing in both apices.   WOB normal  CV: RRR, no MRGs  Abdomen: Soft, non-tender; no masses or HSM, +BS  Extremities: No peripheral edema or digital cyanosis  Skin: no rash, lesions or ulcers  Psych: Calm and cooperative  Neuro: Alert and interactive, face symmetric, moving all extremities, speech fluent    Labs:  CBC with Differential:    Lab Results   Component Value Date    WBC 7.9 04/14/2022    RBC 4.08 04/14/2022    HGB 13.2 04/14/2022    HCT 41.7 04/14/2022     04/14/2022    .2 04/14/2022    MCH 32.4 04/14/2022    MCHC 31.7 04/14/2022    RDW 11.5 04/14/2022    NRBC 0.0 04/22/2021    SEGSPCT 84 03/03/2014    LYMPHOPCT 10.7 04/14/2022    MONOPCT 6.5 04/14/2022    BASOPCT 0.1 04/14/2022    MONOSABS 0.51 04/14/2022    LYMPHSABS 0.84 04/14/2022    EOSABS 0.03 04/14/2022    BASOSABS 0.01 04/14/2022     CMP:    Lab Results   Component Value Date     04/14/2022    K 4.5 04/14/2022    K 4.9 04/11/2022    CL 96 04/14/2022    CO2 31 04/14/2022    BUN 38 04/14/2022    CREATININE 1.5 04/14/2022    GFRAA 42 04/14/2022    LABGLOM 42 04/14/2022    GLUCOSE 221 04/14/2022    GLUCOSE 181 12/01/2011    PROT 6.3 04/11/2022    LABALBU 3.3 04/11/2022 LABALBU 5.2 12/01/2011    CALCIUM 8.8 04/14/2022    BILITOT 0.5 04/11/2022    ALKPHOS 71 04/11/2022    AST 18 04/11/2022    ALT 15 04/11/2022            Assessment/Plan:  Principal Problem:    Acute on chronic diastolic (congestive) heart failure ([facility])  Active Problems:    Chronic respiratory failure with hypoxia and hypercapnia ([facility])    DAYAN and COPD overlap syndrome ([facility])    DM2 (diabetes mellitus, type 2) ([facility])    Severe obesity (BMI 35.0-35.9 with comorbidity) ([facility] [address]    Encounter for tobacco use cessation counseling    Prolonged Q-T interval on ECG    COPD exacerbation ([facility])    Respiratory distress    CKD (chronic kidney disease) stage 3, GFR 30-59 ml/min ([facility])    Hypertensive emergency    Acute kidney injury superimposed on CKD ([facility]  Resolved Problems:    * No resolved hospital problems.  *       ALEC on CKD stable    Bumex per nephrology -- resumed    Duonebs, prednisone for COPD exacerbation    Glucose a bit high, add meal associated insulin    HyperK -- resolved    Continue to hold spironolactone, entresto    DVT prophylaxis: Lovenox  Requires continued inpatient level of care     Isabell Soliz MD    12:53 PM  4/14/2022

## 2022-04-14 NOTE — CARE COORDINATION
4/14/22 Update CM Note; Met with patient at the bedside. She is concerned as her home health aide is finished with the assignment thru [facility] tomorrow. The agency has not been able to fill the position as of yet. She states she does still have aide service on the weekends. She also has a RN thru [facility] home care services who visits monthly. Spoke at length with America Fraser at [facility] who is the CM. She states she is continuing to search for aide services for the patient for week days. She has not yet been successful. Will keep Ana Pradhan updated and follow for possible services for the home.  Electronically signed by Claudia Booker RN CM on 4/14/2022 at 1:39 PM

## 2022-04-14 NOTE — PLAN OF CARE
Problem: Pain:  Goal: Pain level will decrease  Description: Pain level will decrease  Outcome: Ongoing  Goal: Control of acute pain  Description: Control of acute pain  Outcome: Met This Shift  Goal: Control of chronic pain  Description: Control of chronic pain  Outcome: Met This Shift     Problem: Falls - Risk of:  Goal: Will remain free from falls  Description: Will remain free from falls  Outcome: Met This Shift  Goal: Absence of physical injury  Description: Absence of physical injury  4/13/2022 2254 by [facility]  Outcome: Met This Shift  4/13/2022 1607 by Kendra Deluna RN  Outcome: Ongoing     Problem: Musculor/Skeletal Functional Status  Goal: Absence of falls  Outcome: Met This Shift

## 2022-04-15 LAB
ANION GAP SERPL CALCULATED.3IONS-SCNC: 16 MMOL/L (ref 7–16)
BASOPHILS ABSOLUTE: 0.01 E9/L (ref 0–0.2)
BASOPHILS RELATIVE PERCENT: 0.1 % (ref 0–2)
BUN BLDV-MCNC: 50 MG/DL (ref 6–23)
CALCIUM SERPL-MCNC: 9.3 MG/DL (ref 8.6–10.2)
CHLORIDE BLD-SCNC: 95 MMOL/L (ref 98–107)
CO2: 29 MMOL/L (ref 22–29)
CREAT SERPL-MCNC: 1.4 MG/DL (ref 0.5–1)
EOSINOPHILS ABSOLUTE: 0 E9/L (ref 0.05–0.5)
EOSINOPHILS RELATIVE PERCENT: 0 % (ref 0–6)
GFR AFRICAN AMERICAN: 45
GFR NON-AFRICAN AMERICAN: 45 ML/MIN/1.73
GLUCOSE BLD-MCNC: 151 MG/DL (ref 74–99)
HCT VFR BLD CALC: 41.2 % (ref 34–48)
HEMOGLOBIN: 13.2 G/DL (ref 11.5–15.5)
IMMATURE GRANULOCYTES #: 0.05 E9/L
IMMATURE GRANULOCYTES %: 0.5 % (ref 0–5)
LYMPHOCYTES ABSOLUTE: 0.81 E9/L (ref 1.5–4)
LYMPHOCYTES RELATIVE PERCENT: 8.8 % (ref 20–42)
MAGNESIUM: 1.7 MG/DL (ref 1.6–2.6)
MCH RBC QN AUTO: 31.8 PG (ref 26–35)
MCHC RBC AUTO-ENTMCNC: 32 % (ref 32–34.5)
MCV RBC AUTO: 99.3 FL (ref 80–99.9)
METER GLUCOSE: 201 MG/DL (ref 74–99)
METER GLUCOSE: 217 MG/DL (ref 74–99)
METER GLUCOSE: 367 MG/DL (ref 74–99)
MONOCYTES ABSOLUTE: 0.48 E9/L (ref 0.1–0.95)
MONOCYTES RELATIVE PERCENT: 5.2 % (ref 2–12)
NEUTROPHILS ABSOLUTE: 7.88 E9/L (ref 1.8–7.3)
NEUTROPHILS RELATIVE PERCENT: 85.4 % (ref 43–80)
PDW BLD-RTO: 11.4 FL (ref 11.5–15)
PLATELET # BLD: 244 E9/L (ref 130–450)
PMV BLD AUTO: 10.6 FL (ref 7–12)
POTASSIUM SERPL-SCNC: 4.8 MMOL/L (ref 3.5–5)
RBC # BLD: 4.15 E12/L (ref 3.5–5.5)
SODIUM BLD-SCNC: 140 MMOL/L (ref 132–146)
WBC # BLD: 9.2 E9/L (ref 4.5–11.5)

## 2022-04-15 PROCEDURE — 6360000002 HC RX W HCPCS: Performed by: PHYSICIAN ASSISTANT

## 2022-04-15 PROCEDURE — 94640 AIRWAY INHALATION TREATMENT: CPT

## 2022-04-15 PROCEDURE — 83735 ASSAY OF MAGNESIUM: CPT

## 2022-04-15 PROCEDURE — 2140000000 HC CCU INTERMEDIATE R&B

## 2022-04-15 PROCEDURE — 2580000003 HC RX 258: Performed by: PHYSICIAN ASSISTANT

## 2022-04-15 PROCEDURE — 80048 BASIC METABOLIC PNL TOTAL CA: CPT

## 2022-04-15 PROCEDURE — 6370000000 HC RX 637 (ALT 250 FOR IP): Performed by: INTERNAL MEDICINE

## 2022-04-15 PROCEDURE — 6370000000 HC RX 637 (ALT 250 FOR IP): Performed by: NURSE PRACTITIONER

## 2022-04-15 PROCEDURE — 99233 SBSQ HOSP IP/OBS HIGH 50: CPT | Performed by: INTERNAL MEDICINE

## 2022-04-15 PROCEDURE — 6370000000 HC RX 637 (ALT 250 FOR IP): Performed by: PHYSICIAN ASSISTANT

## 2022-04-15 PROCEDURE — S5553 INSULIN LONG ACTING 5 U: HCPCS | Performed by: INTERNAL MEDICINE

## 2022-04-15 PROCEDURE — 2700000000 HC OXYGEN THERAPY PER DAY

## 2022-04-15 PROCEDURE — 85025 COMPLETE CBC W/AUTO DIFF WBC: CPT

## 2022-04-15 PROCEDURE — 82962 GLUCOSE BLOOD TEST: CPT

## 2022-04-15 PROCEDURE — 36415 COLL VENOUS BLD VENIPUNCTURE: CPT

## 2022-04-15 PROCEDURE — 94660 CPAP INITIATION&MGMT: CPT

## 2022-04-15 RX ADMIN — INSULIN GLARGINE-YFGN 25 UNITS: 100 INJECTION, SOLUTION SUBCUTANEOUS at 21:29

## 2022-04-15 RX ADMIN — DULOXETINE HYDROCHLORIDE 60 MG: 60 CAPSULE, DELAYED RELEASE ORAL at 21:28

## 2022-04-15 RX ADMIN — OXYBUTYNIN CHLORIDE 5 MG: 5 TABLET ORAL at 09:09

## 2022-04-15 RX ADMIN — INSULIN LISPRO 1 UNITS: 100 INJECTION, SOLUTION INTRAVENOUS; SUBCUTANEOUS at 09:14

## 2022-04-15 RX ADMIN — BUMETANIDE 2 MG: 1 TABLET ORAL at 09:09

## 2022-04-15 RX ADMIN — PREDNISONE 40 MG: 20 TABLET ORAL at 10:43

## 2022-04-15 RX ADMIN — ASPIRIN 81 MG 81 MG: 81 TABLET ORAL at 09:09

## 2022-04-15 RX ADMIN — HYDRALAZINE HYDROCHLORIDE 50 MG: 25 TABLET, FILM COATED ORAL at 09:09

## 2022-04-15 RX ADMIN — DULOXETINE HYDROCHLORIDE 60 MG: 60 CAPSULE, DELAYED RELEASE ORAL at 09:09

## 2022-04-15 RX ADMIN — FLUTICASONE PROPIONATE 2 SPRAY: 50 SPRAY, METERED NASAL at 09:10

## 2022-04-15 RX ADMIN — GABAPENTIN 100 MG: 100 CAPSULE ORAL at 09:09

## 2022-04-15 RX ADMIN — HYDROCORTISONE: 25 CREAM TOPICAL at 09:10

## 2022-04-15 RX ADMIN — CETIRIZINE HYDROCHLORIDE 10 MG: 10 TABLET, FILM COATED ORAL at 09:09

## 2022-04-15 RX ADMIN — INSULIN LISPRO 5 UNITS: 100 INJECTION, SOLUTION INTRAVENOUS; SUBCUTANEOUS at 21:29

## 2022-04-15 RX ADMIN — HYDRALAZINE HYDROCHLORIDE 50 MG: 25 TABLET, FILM COATED ORAL at 21:28

## 2022-04-15 RX ADMIN — ISOSORBIDE MONONITRATE 60 MG: 60 TABLET, EXTENDED RELEASE ORAL at 09:09

## 2022-04-15 RX ADMIN — INSULIN LISPRO 4 UNITS: 100 INJECTION, SOLUTION INTRAVENOUS; SUBCUTANEOUS at 09:11

## 2022-04-15 RX ADMIN — OXYBUTYNIN CHLORIDE 5 MG: 5 TABLET ORAL at 21:28

## 2022-04-15 RX ADMIN — GABAPENTIN 100 MG: 100 CAPSULE ORAL at 21:28

## 2022-04-15 RX ADMIN — FAMOTIDINE 20 MG: 20 TABLET ORAL at 17:01

## 2022-04-15 RX ADMIN — PANTOPRAZOLE SODIUM 40 MG: 40 TABLET, DELAYED RELEASE ORAL at 09:09

## 2022-04-15 RX ADMIN — IPRATROPIUM BROMIDE AND ALBUTEROL SULFATE 3 ML: .5; 2.5 SOLUTION RESPIRATORY (INHALATION) at 16:12

## 2022-04-15 RX ADMIN — HYDROCODONE BITARTRATE AND ACETAMINOPHEN 1 TABLET: 5; 325 TABLET ORAL at 14:04

## 2022-04-15 RX ADMIN — TRAZODONE HYDROCHLORIDE 100 MG: 50 TABLET ORAL at 21:28

## 2022-04-15 RX ADMIN — IPRATROPIUM BROMIDE AND ALBUTEROL SULFATE 3 ML: .5; 2.5 SOLUTION RESPIRATORY (INHALATION) at 19:44

## 2022-04-15 RX ADMIN — INSULIN LISPRO 4 UNITS: 100 INJECTION, SOLUTION INTRAVENOUS; SUBCUTANEOUS at 11:52

## 2022-04-15 RX ADMIN — SODIUM CHLORIDE, PRESERVATIVE FREE 10 ML: 5 INJECTION INTRAVENOUS at 21:29

## 2022-04-15 RX ADMIN — MONTELUKAST SODIUM 10 MG: 10 TABLET ORAL at 21:27

## 2022-04-15 RX ADMIN — CARVEDILOL 25 MG: 25 TABLET, FILM COATED ORAL at 09:20

## 2022-04-15 RX ADMIN — SODIUM CHLORIDE, PRESERVATIVE FREE 10 ML: 5 INJECTION INTRAVENOUS at 09:21

## 2022-04-15 RX ADMIN — GUAIFENESIN 200 MG: 200 SOLUTION ORAL at 16:09

## 2022-04-15 RX ADMIN — ATORVASTATIN CALCIUM 10 MG: 10 TABLET, FILM COATED ORAL at 21:28

## 2022-04-15 RX ADMIN — DOXYCYCLINE HYCLATE 100 MG: 100 CAPSULE ORAL at 21:28

## 2022-04-15 RX ADMIN — INSULIN LISPRO 4 UNITS: 100 INJECTION, SOLUTION INTRAVENOUS; SUBCUTANEOUS at 11:56

## 2022-04-15 RX ADMIN — HYDRALAZINE HYDROCHLORIDE 50 MG: 25 TABLET, FILM COATED ORAL at 14:04

## 2022-04-15 RX ADMIN — DOXYCYCLINE HYCLATE 100 MG: 100 CAPSULE ORAL at 09:09

## 2022-04-15 RX ADMIN — INSULIN LISPRO 4 UNITS: 100 INJECTION, SOLUTION INTRAVENOUS; SUBCUTANEOUS at 17:01

## 2022-04-15 RX ADMIN — IPRATROPIUM BROMIDE AND ALBUTEROL SULFATE 3 ML: .5; 2.5 SOLUTION RESPIRATORY (INHALATION) at 10:08

## 2022-04-15 RX ADMIN — ENOXAPARIN SODIUM 40 MG: 100 INJECTION SUBCUTANEOUS at 09:09

## 2022-04-15 RX ADMIN — INSULIN LISPRO 4 UNITS: 100 INJECTION, SOLUTION INTRAVENOUS; SUBCUTANEOUS at 17:04

## 2022-04-15 RX ADMIN — GUAIFENESIN 200 MG: 200 SOLUTION ORAL at 21:36

## 2022-04-15 ASSESSMENT — PAIN DESCRIPTION - FREQUENCY
FREQUENCY: INTERMITTENT
FREQUENCY: INTERMITTENT
FREQUENCY: CONTINUOUS

## 2022-04-15 ASSESSMENT — PAIN DESCRIPTION - PROGRESSION
CLINICAL_PROGRESSION: NOT CHANGED

## 2022-04-15 ASSESSMENT — PAIN DESCRIPTION - PAIN TYPE
TYPE: ACUTE PAIN
TYPE: CHRONIC PAIN
TYPE: ACUTE PAIN

## 2022-04-15 ASSESSMENT — PAIN DESCRIPTION - ONSET
ONSET: ON-GOING
ONSET: GRADUAL
ONSET: GRADUAL

## 2022-04-15 ASSESSMENT — PAIN - FUNCTIONAL ASSESSMENT
PAIN_FUNCTIONAL_ASSESSMENT: ACTIVITIES ARE NOT PREVENTED

## 2022-04-15 ASSESSMENT — PAIN SCALES - GENERAL
PAINLEVEL_OUTOF10: 0
PAINLEVEL_OUTOF10: 10
PAINLEVEL_OUTOF10: 0
PAINLEVEL_OUTOF10: 0
PAINLEVEL_OUTOF10: 4
PAINLEVEL_OUTOF10: 0
PAINLEVEL_OUTOF10: 4

## 2022-04-15 ASSESSMENT — PAIN DESCRIPTION - LOCATION
LOCATION: ABDOMEN
LOCATION: ABDOMEN
LOCATION: LEG

## 2022-04-15 ASSESSMENT — PAIN DESCRIPTION - DESCRIPTORS
DESCRIPTORS: BURNING;DISCOMFORT;CRAMPING
DESCRIPTORS: BURNING;CRAMPING;DISCOMFORT

## 2022-04-15 ASSESSMENT — PAIN DESCRIPTION - ORIENTATION
ORIENTATION: MID
ORIENTATION: RIGHT
ORIENTATION: MID

## 2022-04-15 NOTE — PATIENT CARE CONFERENCE
P Quality Flow/Interdisciplinary Rounds Progress Note        Quality Flow Rounds held on April 15, 2022    Disciplines Attending:  Bedside Nurse, ,  and Nursing Unit Marj Farr was admitted on 4/11/2022  9:54 AM    Anticipated Discharge Date:       Disposition:    Jadon Score:  Jadon Scale Score: 20    Readmission Risk              Risk of Unplanned Readmission:  38           Discussed patient goal for the day, patient clinical progression, and barriers to discharge.   The following Goal(s) of the Day/Commitment(s) have been identified:  Labs - Report Results      Kristian Ortiz RN  April 15, 2022

## 2022-04-15 NOTE — PROGRESS NOTES
[facility]  Nephrology Progress Note    Patient's Name: Yfn Bella      History of Present Illness from 4/12 Consult Note:    Vicky Rothman is a 78 y.o. female with a past medical history of peripheral vascular disease, COPD, Covid, diverticulosis, coronary artery disease, hypertension, and hyperlipidemia. She presented to the ED on 4/11, per the H&P for shortness of breath x4 days. Vital signs at presentation to the ED include temperature 98.3, respirations 18, pulse 143, /169, and she was 95% on the CPAP. Lab data at presentation to the ED include CO2 21, creatinine 1.2, calcium 8.2, and albumin 3.3. Chest x-ray showed \"bilateral airspace disease. \"  We were consulted to see the patient for mild ALEC on CKD. Her baseline creatinine from July 2021 was 1.1-1.2. At present, patient was seen and examined. She explained to me that she came in for shortness of breath. She currently denies any chest pain. However, she explained that she continues to have intermittent shortness of breath. She denies any nausea or vomiting. She reports that she was having abdominal pain. She also explained that she has been experiencing weakness and fatigue. Prior to admission the patient explained that she was drinking okay, but did not have a good appetite. \"    Subjective:    4/15: Patient was seen and examined. She reports that she was still coughing overnight. She also explained that she is having abdominal pain.     PMH:    Past Medical History:   Diagnosis Date    Asthma     Atherosclerosis of native artery of left leg with rest pain ([facility] 11/09/2018    CAD (coronary artery disease) 2011    Cellulitis and abscess of trunk 04/14/2015    Cerebellar infarct ([facility] [address] 04/03/2019    Remote, rt. cerebellum, head CT scan, 1/9/19    Chronic back pain     Chronic kidney disease     Chronic systolic congestive heart failure ([address] 10/04/2013    Chronic venous insufficiency 10/11/2017    COPD (chronic obstructive pulmonary disease) ([address]     COVID-19     Depression     Diabetes mellitus ([address]     Diabetic neuropathy ([address]     Diverticulosis     Fatty liver 01/08/2016    per [place]    Glaucoma, open angle 02/01/2016    Mild-OU    Hepatic encephalopathy ([address] 02/07/2016    resolved    Hiatal hernia     History of blood transfusion     Hyperlipidemia     Hyperplastic colon polyp     Hypertension     Incontinence     Liver mass     Morbid obesity ([address]     Movement disorder     Myocardial infarction ([address] 2011    O2 dependent 09/16/2021    with bipap 3 liters    Osteoarthritis, generalized     Pain in both lower legs 10/11/2017    Peripheral vascular disease ([address]     Pneumonia 01/26/2014    Pulmonary edema     resolved    PVD (peripheral vascular disease) with claudication ([address] 08/30/2017    Sleep apnea     uses BI PAP    Status post peripheral artery angioplasty 10/31/2019    Tobacco abuse     Tobacco abuse 10/11/2017    Tubular adenoma polyp of rectum     Urinary tract infection due to ESBL Klebsiella 03/08/2017    Ventral hernia      Patient Active Problem List   Diagnosis    Severe obesity (BMI 35.0-35.9 with comorbidity) ([address]    Hyperlipidemia    DAYAN and COPD overlap syndrome ([address]    Vitamin D insufficiency    Chronic combined systolic and diastolic heart failure (HCC)    GERD (gastroesophageal reflux disease)    Major depressive disorder, recurrent episode, mild ([address]    Diabetic polyneuropathy associated with type 2 diabetes mellitus (HCC)    Chronic passive hepatic congestion    Mixed incontinence urge and stress    Chronic back pain - d/t muscle spasm    Glaucoma, open angle    Elevated CA 19-9 level    Lumbar stenosis    Spondylosis 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    Lumbar disc herniation    Asymmetric septal hypertrophy (HCC)    Non-compliance    Hiatal hernia    Melanosis coli    Coronary artery disease involving native coronary artery of native heart without angina pectoris    DM2 (diabetes mellitus, type 2) ([facility] [address] [facility]    Cigarette smoker    Marijuana use, smoked    Ischemic cardiomyopathy    Encounter for tobacco use cessation counseling    PVD (peripheral vascular disease) with claudication ([facility]    Chronic venous insufficiency    History of non-ST elevation myocardial infarction (NSTEMI)    Prolonged Q-T interval on ECG    History of stroke    COPD exacerbation ([facility])    Chronic respiratory failure with hypoxia and hypercapnia ([facility])    COPD (chronic obstructive pulmonary disease) ([facility])    Status post peripheral artery angioplasty    Uncontrolled hypertension    O2 dependent    Respiratory distress    Acute on chronic diastolic (congestive) heart failure ([facility])    CKD (chronic kidney disease) stage 3, GFR 30-59 ml/min ([facility])    Hypertensive emergency    Acute kidney injury superimposed on CKD ([facility])     Meds:     hydrocortisone   Topical BID    insulin lispro  4 Units SubCUTAneous TID WC    bumetanide  2 mg Oral Daily    oxybutynin  5 mg Oral BID    insulin glargine  25 Units SubCUTAneous Nightly    lidocaine  1 patch TransDERmal Daily    predniSONE  40 mg Oral Daily    doxycycline hyclate  100 mg Oral 2 times per day    nicotine  1 patch TransDERmal Daily    labetalol  10 mg IntraVENous Once    aspirin  81 mg Oral Daily    carvedilol  25 mg Oral BID WC    cetirizine  10 mg Oral Daily    DULoxetine  60 mg Oral BID    famotidine  20 mg Oral Dinner    fluticasone  2 spray Nasal Daily    gabapentin  100 mg Oral BID    hydrALAZINE  50 mg Oral TID    ipratropium-albuterol  1 vial Nebulization Q4H WA    isosorbide mononitrate  60 mg Oral Daily    mirabegron  50 mg Oral Nightly    montelukast  10 mg Oral Nightly    pantoprazole  40 mg Oral Daily    [Held by provider] sacubitril-valsartan  1 tablet Oral BID    atorvastatin  10 mg Oral Nightly    [Held by provider] spironolactone  25 mg Oral Daily    traZODone  100 mg Oral Nightly    sodium chloride flush  5-40 mL IntraVENous 2 times per day    enoxaparin  40 mg SubCUTAneous Daily    insulin lispro  0-12 Units SubCUTAneous TID WC    insulin lispro  0-6 Units SubCUTAneous Nightly    [Held by provider] bumetanide  1 mg IntraVENous BID      sodium chloride      dextrose       Meds prn:     guaiFENesin, HYDROcodone 5 mg - acetaminophen, perflutren lipid microspheres, Arformoterol Tartrate, sodium chloride flush, sodium chloride, polyethylene glycol, acetaminophen **OR** acetaminophen, glucose, dextrose, glucagon (rDNA), dextrose, magnesium sulfate, potassium chloride **OR** potassium alternative oral replacement **OR** potassium chloride, hydrALAZINE    Meds prior to admission:     No current facility-administered medications on file prior to encounter. Current Outpatient Medications on File Prior to Encounter   Medication Sig Dispense Refill    acetaminophen (TYLENOL) 325 MG tablet Take 650 mg by mouth every 6 hours as needed for Pain      aspirin 81 MG chewable tablet Take 81 mg by mouth daily      Arformoterol Tartrate (BROVANA) 15 MCG/2ML NEBU Take 1 ampule by nebulization 2 times daily as needed      azelastine (ASTELIN) 0.1 % nasal spray 1 spray by Nasal route 2 times daily as needed for Rhinitis      cetirizine (ZYRTEC) 10 MG tablet Take 10 mg by mouth daily      DULoxetine (CYMBALTA) 60 MG extended release capsule Take 60 mg by mouth 2 times daily      sacubitril-valsartan (ENTRESTO) 49-51 MG per tablet Take 1 tablet by mouth 2 times daily      famotidine (PEPCID) 40 MG tablet Take 40 mg by mouth Daily with supper      fluticasone (FLONASE) 50 MCG/ACT nasal spray 2 sprays by Nasal route daily      gabapentin (NEURONTIN) 100 MG capsule Take 100 mg by mouth 2 times daily.       Insulin Degludec (TRESIBA FLEXTOUCH) 200 UNIT/ML SOPN Inject 50 Units into the skin at bedtime      insulin lispro, 1 Unit Dial, (HUMALOG KWIKPEN) 100 UNIT/ML SOPN Inject 0-6 Units into the skin 3 times daily (before meals) *Per Sliding Scale*      ipratropium-albuterol (DUONEB) 0.5-2.5 (3) MG/3ML SOLN nebulizer solution Take 1 vial by nebulization every 4 hours as needed for Shortness of Breath      isosorbide mononitrate (IMDUR) 60 MG extended release tablet Take 60 mg by mouth daily      montelukast (SINGULAIR) 10 MG tablet Take 10 mg by mouth nightly      mirabegron (MYRBETRIQ) 50 MG TB24 Take 50 mg by mouth at bedtime      simvastatin (ZOCOR) 20 MG tablet Take 20 mg by mouth nightly      tiotropium (SPIRIVA RESPIMAT) 1.25 MCG/ACT AERS inhaler Inhale 2 puffs into the lungs daily      traZODone (DESYREL) 100 MG tablet Take 100 mg by mouth nightly      vitamin D (CHOLECALCIFEROL) 25 MCG (1000 UT) TABS tablet Take 1,000 Units by mouth daily      bumetanide (BUMEX) 1 MG tablet Take 1 tablet by mouth daily 30 tablet 11    spironolactone (ALDACTONE) 25 MG tablet Take 1 tablet by mouth daily 90 tablet 3    carvedilol (COREG) 25 MG tablet Take 1 tablet by mouth 2 times daily (with meals) 180 tablet 3    hydrALAZINE (APRESOLINE) 50 MG tablet Take 50 mg by mouth 3 times daily      pantoprazole (PROTONIX) 40 MG tablet Take 40 mg by mouth daily       metoclopramide (REGLAN) 5 MG tablet Take 5 mg by mouth 3 times daily as needed  120 tablet 3    docusate sodium (COLACE) 100 MG capsule Take 200 mg by mouth at bedtime        Allergies:    Latex, Bee venom, Dilaudid [hydromorphone hcl], Dye [iodides], Percocet [oxycodone-acetaminophen], Keflex [cephalexin], Lasix [furosemide], Levaquin [levofloxacin in d5w], Lipitor, Lyrica [pregabalin], Morphine, Naproxen, Nefazodone, Norvasc [amlodipine], Oxycodone-acetaminophen, Shellfish-derived products, and Trazodone and nefazodone    Social History:     reports that she has been smoking cigarettes. She started smoking about 47 years ago. She has a 10.00 pack-year smoking history.  She has never used smokeless tobacco. She reports ng/mL.......... [facility] Insufficient   ng/mL. ........ [facility] Sufficient  >100 ng/mL. .......... [facility] Toxic       No results found for: PTH    No results for input(s): ALT, AST, ALKPHOS, BILITOT, BILIDIR in the last 72 hours. No results for input(s): LABALBU in the last 72 hours. Ferritin   Date Value Ref Range Status   11/02/2017 172 ng/mL Final     Comment:     FERRITIN Reference Ranges:  Adult Males   20 - 60 yrars:    30 - 400 ng/mL  Adult females 16 - 60 years:    15 - 150 ng/mL  Adults greater than 60 years:   no established reference range  Pediatrics:                     no established reference range       Iron   Date Value Ref Range Status   11/02/2017 51 37 - 145 mcg/dL Final     TIBC   Date Value Ref Range Status   11/02/2017 266 250 - 450 mcg/dL Final     Vitamin B-12   Date Value Ref Range Status   06/16/2020 1943 (H) 211 - 946 pg/mL Final     Folate   Date Value Ref Range Status   06/16/2020 >20.0 4.8 - 24.2 ng/mL Final     Lab Results   Component Value Date    COLORU Yellow 04/12/2022    NITRU Negative 04/12/2022    GLUCOSEU Negative 04/12/2022    GLUCOSEU NEGATIVE 04/25/2011    KETUA Negative 04/12/2022    UROBILINOGEN 0.2 04/12/2022    BILIRUBINUR Negative 04/12/2022    BILIRUBINUR negative 03/03/2020    BILIRUBINUR NEGATIVE 04/25/2011     No results found for: RAZIA, CREURRAN, MACREATRATIO, OSMOU    No components found for: URIC    No results found for: LIPIDPAN    Assessment and Plan:    1. ALEC stage I on CKD 3 a  Likely prerenal in setting of decreased effective renal perfusion given the use of bumex/ spironolactone, with entresto contributing   baseline creatinine from July 2021 was 1.1-1.2  Continue to hold Entresto, spironolactone, and Bumex  Cr peaked at 1.6 --> 1.4 today  FeNa 1%, FeUrea 29.8%  Strict I&O, daily weights  Avoid nephrotoxins/NSAIDs  Monitor labs  Follow    2.   HFpEF  proBNP from 4/11 was 2108  Echo 6/2021 showed EF 55-60%, left ventricular hypertrophy severe  Chest x-ray showed \"bilateral airspace disease\"  Entresto on hold  spironolactone 25 mg oral daily on hold  On Bumex 2 mg oral daily  Strict I&O, daily weights  Cardiology also following    3. Hypertension,   BP goal<130/80  BP nearing goal  Follow on current regimen    4. Type 2 diabetes mellitus  Hemoglobin A1c from 7/2021 was 6.9%  On insulin lispro and insulin glargine  Management per primary    5. Hyperkalemia  likely in setting of ALEC/CKD/Entresto  K+ 4.8 today  Entresto/spironolactone on hold  Low potassium diet  Follow    Sumeet Ceballos, APRN - CNP     Patient seen and examined all key components of the physical performed independently , case discussed with NP, all pertinent labs and radiologic tests personally reviewed agree with above.     Cr back to baseline  Stress test pending  OK to resume other cardiac meds from a Renal standpoint     Pratibha Reveles MD

## 2022-04-15 NOTE — PLAN OF CARE
Problem: Pain:  Goal: Pain level will decrease  4/15/2022 1719 by Nancy Ojeda RN  Outcome: Met This Shift     Problem: Pain:  Goal: Control of acute pain  4/15/2022 1719 by Nancy Ojeda RN  Outcome: Met This Shift     Problem: Pain:  Goal: Control of chronic pain  4/15/2022 1719 by Nancy Ojeda RN  Outcome: Met This Shift     Problem: Falls - Risk of:  Goal: Will remain free from falls  4/15/2022 1719 by Nancy Ojeda RN  Outcome: Met This Shift     Problem: Falls - Risk of:  Goal: Absence of physical injury  4/15/2022 1719 by Nancy Ojeda RN  Outcome: Met This Shift     Problem: Musculor/Skeletal Functional Status  Goal: Highest potential functional level  4/15/2022 1719 by Nancy Ojeda RN  Outcome: Met This Shift     Problem: Musculor/Skeletal Functional Status  Goal: Absence of falls  4/15/2022 1719 by Nancy Ojeda RN  Outcome: Met This Shift

## 2022-04-15 NOTE — PROGRESS NOTES
Chief Complaint:  Chief Complaint   Patient presents with    Respiratory Distress     increased SOB the last 4 days. was 90% on trilogy oxygen concentrator at home. EMS placed on CPAP, arrived at 95%, hx of COPD      Acute on chronic diastolic (congestive) heart failure (HCC)     Subjective:    Breathing a little better    Objective:    /66   Pulse 65   Temp 97 °F (36.1 °C) (Temporal)   Resp 20   Ht 5' 2\" (1.575 m)   Wt 211 lb 12.8 oz (96.1 kg)   LMP 01/01/1990   SpO2 95%   BMI 38.74 kg/m²     Current medications that patient is taking have been reviewed. General appearance: NAD, conversant, obese, very chronically ill appearing but comfortable/nontoxic appearing  HEENT: AT/NC, MMM  Neck: FROM, supple  Lungs: Dull, b/l rales, lots of wheezing in both apices.   WOB normal  CV: RRR, no MRGs  Abdomen: Soft, non-tender; no masses or HSM, +BS  Extremities: No peripheral edema or digital cyanosis  Skin: no rash, lesions or ulcers  Psych: Calm and cooperative  Neuro: Alert and interactive, face symmetric, moving all extremities, speech fluent    Labs:  CBC with Differential:    Lab Results   Component Value Date    WBC 9.2 04/15/2022    RBC 4.15 04/15/2022    HGB 13.2 04/15/2022    HCT 41.2 04/15/2022     04/15/2022    MCV 99.3 04/15/2022    MCH 31.8 04/15/2022    MCHC 32.0 04/15/2022    RDW 11.4 04/15/2022    NRBC 0.0 04/22/2021    SEGSPCT 84 03/03/2014    LYMPHOPCT 8.8 04/15/2022    MONOPCT 5.2 04/15/2022    BASOPCT 0.1 04/15/2022    MONOSABS 0.48 04/15/2022    LYMPHSABS 0.81 04/15/2022    EOSABS 0.00 04/15/2022    BASOSABS 0.01 04/15/2022     CMP:    Lab Results   Component Value Date     04/15/2022    K 4.8 04/15/2022    K 4.9 04/11/2022    CL 95 04/15/2022    CO2 29 04/15/2022    BUN 50 04/15/2022    CREATININE 1.4 04/15/2022    GFRAA 45 04/15/2022    LABGLOM 45 04/15/2022    GLUCOSE 151 04/15/2022    GLUCOSE 181 12/01/2011    PROT 6.3 04/11/2022    LABALBU 3.3 04/11/2022    LABALBU 5.2 12/01/2011    CALCIUM 9.3 04/15/2022    BILITOT 0.5 04/11/2022    ALKPHOS 71 04/11/2022    AST 18 04/11/2022    ALT 15 04/11/2022            Assessment/Plan:  Principal Problem:    Acute on chronic diastolic (congestive) heart failure (HCC)  Active Problems:    Chronic respiratory failure with hypoxia and hypercapnia ([facility])    DAYAN and COPD overlap syndrome ([facility])    DM2 (diabetes mellitus, type 2) ([facility])    Severe obesity (BMI 35.0-35.9 with comorbidity) ([facility]    Encounter for tobacco use cessation counseling    Prolonged Q-T interval on ECG    COPD exacerbation ([facility])    Respiratory distress    CKD (chronic kidney disease) stage 3, GFR 30-59 ml/min ([facility])    Hypertensive emergency    Acute kidney injury superimposed on CKD ([facility] [address]  Resolved Problems:    * No resolved hospital problems. *       ALEC on CKD stable    Continue Bumex    Duonebs, prednisone for COPD exacerbation.   Still smoking    Glucose improving    Continue to hold spironolactone, entresto    Stress test pending    DVT prophylaxis: Lovenox  Requires continued inpatient level of care     Star Baker MD    1:38 PM  4/15/2022

## 2022-04-15 NOTE — PROGRESS NOTES
Date: 4/14/2022    Time: 9:18 PM    Patient Placed On BIPAP/CPAP/ Non-Invasive Ventilation? Yes    If no must comment. Facial area red/color change? No           If YES are Blister/Lesion present? No   If yes must notify nursing staff  BIPAP/CPAP skin barrier?   No    Skin barrier type      Comments:        Jany Toth

## 2022-04-15 NOTE — PROGRESS NOTES
//      Inpatient Cardiology Consultation follow-up visit    Reason for Consult:  CHF    Consulting Physician: Dr. James Gross    Requesting Physician:  TONY Herrera    Date of Consultation: 4/15/2022    Subjective: Stress test tomorrow. N.p.o. after midnight. Past Medical History:  (Please note the following information was obtained from consult note on 7/16/2021 when additional information added). 1. Morbid obesity  2. Hypertension  3. Hyperlipidemia. Currently not on a statin but has been intolerant of Lipitor  4. Current tobacco abuse  5. Diabetes which is insulin requiring , diabetic neuropathy   6. Obstructive sleep apnea, states compliance with Cpap  7. Ischemic cardiomyopathy with a CABG in 2011 with a LIMA to the LAD and a saphenous vein graft to the marginal last cardiac catheterization was in April 2015 and the LIMA was unable to be visualized and the saphenous vein graft to the obtuse marginal was patent.   8. 2-D echocardiogram January 4, 2017, EF 35-45% and diffuse hypokinesis of the left ventricle and left atrium is dilatedAortic valve appears mildly sclerotic and there is mild mitral regurgitation  9. NSTEMI: 6/6/2018 (troponin 0.35, 0.32, 0.48) no rise in CK or CK-MB.  Creatinine 1.0.  Per cardiology, probable recent NSTEMI, type II low risk noninvasive 6/5/2018.  Patient was diuresed approximately 10 L --> And discharged home on loop diuretic. 10. Lexiscan MPS: 6/5/2018: Pharmacologically induced perfusion defect involving the lateral wall, without evidence of reversibility, diffusely hypokinetic left ventricular wall motion with a focal area of akinesis involving the mid lateral wall, LVEF 42%. 11. TTE: 6/5/2018: LVEF 65% by biplane, stage II diastolic dysfunction, moderate LVH, no wall motion abnormality, normal RV function, no pericardial effusion. 12. TTE 06/11/2021 (Dr. Preeti Arevalo): Left ventricle size is normal. Ejection fraction is visually estimated at 55-60%.  No regional wall motion abnormalities seen. Severe concentric left ventricular hypertrophy. Indeterminate diastolic function. Normal right ventricular size. Right ventricle global systolic function is mildly reduced . TAPSE 15 mm.  No hemodynamically significant aortic stenosis is present. Physiologic and/or trace tricuspid regurgitation. RVSP is 26 mmHg. Normal estimated PA systolic pressure. No evidence for hemodynamically significant pericardial effusion. 13. Chronic RBBB    14. IV dye, Lipitor, Lasix, Norvasc allergies: Unknown reaction per patient  15. C. diff 2015  16. Asthma status post bronchial brush biopsy in January 2013   17. Osteoarthritis. And left knee replacement  18. Hx Liver biopsy. Hepatic encephalopathy  19. S/p Bilateral breast reduction   20. Diverticulosis. 21. Hyperplastic colon polyps and fatty liver  22. Hx Ventral hernia  23. Glaucoma  24. Hx Anemia and blood transfusion  25. Hx Pneumonia  26. Depression  27. S/p Cholecystectomy   28. Patient follows with neurology at [facility] and was diagnosed with chronic pain syndrome and is recommending chronic pain management. 29. History of severe peripheral vascular disease with history of left popliteal and tibial trunk atherectomy on 11/13/2018 was initiated on aspirin and Plavix. 27. COVID-19 infection (April 2021)  31. Hospital admission 7/15/2021-7/21/2021: Admitted for hypoxia /patient was diagnosed with Covid-19 infection 4/2021 and now requiring oxygen. Treated for acute hypoxic respiratory failure secondary to acute COPD exacerbation and acute on chronic HFpEF  32. 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 Medications Prior to admit:  Prior to Admission medications    Medication Sig Start Date End Date Taking?  Authorizing Provider   acetaminophen (TYLENOL) 325 MG tablet Take 650 mg by mouth every 6 hours as needed for Pain   Yes Historical Provider, MD   aspirin 81 MG chewable tablet Take 81 mg by mouth daily   Yes Historical Provider, MD   Arformoterol Tartrate (BROVANA) 15 MCG/2ML NEBU Take 1 ampule by nebulization 2 times daily as needed   Yes Historical Provider, MD   azelastine (ASTELIN) 0.1 % nasal spray 1 spray by Nasal route 2 times daily as needed for Rhinitis   Yes Historical Provider, MD   cetirizine (ZYRTEC) 10 MG tablet Take 10 mg by mouth daily   Yes Historical Provider, MD   DULoxetine (CYMBALTA) 60 MG extended release capsule Take 60 mg by mouth 2 times daily   Yes Historical Provider, MD   sacubitril-valsartan (ENTRESTO) 49-51 MG per tablet Take 1 tablet by mouth 2 times daily   Yes Historical Provider, MD   famotidine (PEPCID) 40 MG tablet Take 40 mg by mouth Daily with supper   Yes Historical Provider, MD   fluticasone (FLONASE) 50 MCG/ACT nasal spray 2 sprays by Nasal route daily   Yes Historical Provider, MD   gabapentin (NEURONTIN) 100 MG capsule Take 100 mg by mouth 2 times daily.    Yes Historical Provider, MD   Insulin Degludec (TRESIBA FLEXTOUCH) 200 UNIT/ML SOPN Inject 50 Units into the skin at bedtime   Yes Historical Provider, MD   insulin lispro, 1 Unit Dial, (HUMALOG KWIKPEN) 100 UNIT/ML SOPN Inject 0-6 Units into the skin 3 times daily (before meals) *Per Sliding Scale*   Yes Historical Provider, MD   ipratropium-albuterol (DUONEB) 0.5-2.5 (3) MG/3ML SOLN nebulizer solution Take 1 vial by nebulization every 4 hours as needed for Shortness of Breath   Yes Historical Provider, MD   isosorbide mononitrate (IMDUR) 60 MG extended release tablet Take 60 mg by mouth daily   Yes Historical Provider, MD   montelukast (SINGULAIR) 10 MG tablet Take 10 mg by mouth nightly   Yes Historical Provider, MD   mirabegron (MYRBETRIQ) 50 MG TB24 Take 50 mg by mouth at bedtime   Yes Historical Provider, MD   simvastatin (ZOCOR) 20 MG tablet Take 20 mg by mouth nightly   Yes Historical Provider, MD   tiotropium (SPIRIVA RESPIMAT) 1.25 MCG/ACT AERS inhaler Inhale 2 puffs into the lungs daily   Yes Historical Provider, MD   traZODone (DESYREL) 100 MG tablet Take 100 mg by mouth nightly   Yes Historical Provider, MD   vitamin D (CHOLECALCIFEROL) 25 MCG (1000 UT) TABS tablet Take 1,000 Units by mouth daily   Yes Historical Provider, MD   bumetanide (BUMEX) 1 MG tablet Take 1 tablet by mouth daily 10/25/21   Adolfo Atkins MD   spironolactone (ALDACTONE) 25 MG tablet Take 1 tablet by mouth daily 9/15/21   Adolfo Atkins MD   carvedilol (COREG) 25 MG tablet Take 1 tablet by mouth 2 times daily (with meals) 9/15/21   Adolfo Atkins MD   hydrALAZINE (APRESOLINE) 50 MG tablet Take 50 mg by mouth 3 times daily    Historical Provider, MD   pantoprazole (PROTONIX) 40 MG tablet Take 40 mg by mouth daily  6/21/21   Historical Provider, MD   metoclopramide (REGLAN) 5 MG tablet Take 5 mg by mouth 3 times daily as needed  5/5/21   Tatum Hayes DO   docusate sodium (COLACE) 100 MG capsule Take 200 mg by mouth at bedtime  3/11/21   Historical Provider, MD       Current Medications:    Current Facility-Administered Medications: hydrocortisone 2.5 % cream, , Topical, BID  insulin lispro (HUMALOG) injection vial 4 Units, 4 Units, SubCUTAneous, TID WC  bumetanide (BUMEX) tablet 2 mg, 2 mg, Oral, Daily  oxybutynin (DITROPAN) tablet 5 mg, 5 mg, Oral, BID  guaiFENesin (ROBITUSSIN) 100 MG/5ML oral solution 200 mg, 200 mg, Oral, Q6H PRN  insulin glargine-yfgn (SEMGLEE-YFGN) injection vial 25 Units, 25 Units, SubCUTAneous, Nightly  lidocaine 4 % external patch 1 patch, 1 patch, TransDERmal, Daily  predniSONE (DELTASONE) tablet 40 mg, 40 mg, Oral, Daily  doxycycline hyclate (VIBRAMYCIN) capsule 100 mg, 100 mg, Oral, 2 times per day  HYDROcodone-acetaminophen (NORCO) 5-325 MG per tablet 1 tablet, 1 tablet, Oral, Q6H PRN  nicotine (NICODERM CQ) 14 MG/24HR 1 patch, 1 patch, TransDERmal, Daily  perflutren lipid microspheres (DEFINITY) injection 1.65 mg, 1.5 mL, IntraVENous, ONCE PRN  labetalol (NORMODYNE;TRANDATE) injection 10 15 g, Oral, PRN  dextrose 50 % IV solution, 12.5 g, IntraVENous, PRN  glucagon (rDNA) injection 1 mg, 1 mg, IntraMUSCular, PRN  dextrose 5 % solution, 100 mL/hr, IntraVENous, PRN  magnesium sulfate 2000 mg in 50 mL IVPB premix, 2,000 mg, IntraVENous, PRN  potassium chloride (KLOR-CON M) extended release tablet 40 mEq, 40 mEq, Oral, PRN **OR** potassium bicarb-citric acid (EFFER-K) effervescent tablet 40 mEq, 40 mEq, Oral, PRN **OR** potassium chloride 10 mEq/100 mL IVPB (Peripheral Line), 10 mEq, IntraVENous, PRN  [Held by provider] bumetanide (BUMEX) injection 1 mg, 1 mg, IntraVENous, BID  hydrALAZINE (APRESOLINE) injection 10 mg, 10 mg, IntraVENous, Q4H PRN    Allergies:  Latex, Bee venom, Dilaudid [hydromorphone hcl], Dye [iodides], Percocet [oxycodone-acetaminophen], Keflex [cephalexin], Lasix [furosemide], Levaquin [levofloxacin in d5w], Lipitor, Lyrica [pregabalin], Morphine, Naproxen, Nefazodone, Norvasc [amlodipine], Oxycodone-acetaminophen, Shellfish-derived products, and Trazodone and nefazodone    Social History:    Current smoker: 0.25 PPD x 40 years   Denies alcohol  Marijuana use: 1-2 times per month. Resides alone but has family near by to assist with getting food and taking her to her appointments. Family History: Noncontributory due to advanced age. REVIEW OF SYSTEMS:     · Constitutional: + fatigue, +fevers +chills. Denies  night sweats  · Eyes: Denies visual changes or drainage  · ENT: Denies headaches or hearing loss. No mouth sores or sore throat. No epistaxis   · Cardiovascular: See HPI. · Respiratory: +SAUNDERS, + cough, +orthopnea. Denies PND. No hemoptysis   · Gastrointestinal: Denies hematemesis or anorexia. No hematochezia or melena    · Genitourinary: Denies urgency, dysuria or hematuria. · Musculoskeletal: Denies gait disturbance, weakness or joint complaints  · Integumentary: Denies rash, hives or pruritis   · Neurological: Denies dizziness, headaches or seizures.  No numbness or tingling  · Psychiatric: Denies anxiety or depression. · Endocrine: Denies temperature intolerance. No recent weight change. .  · Hematologic/Lymphatic: Denies abnormal bruising or bleeding. No swollen lymph nodes    PHYSICAL EXAM:   BP (!) 116/53 Comment: RN notified   Pulse 58   Temp 98.1 °F (36.7 °C) (Temporal)   Resp 18   Ht 5' 2\" (1.575 m)   Wt 211 lb 12.8 oz (96.1 kg)   LMP 1990   SpO2 95%   BMI 38.74 kg/m²   CONST:  Well developed, well nourished middle aged AA female who appears of stated age. Awake, alert and cooperative. No apparent distress. Dyspenic during conversation. HEENT:   Head- Normocephalic, atraumatic   Eyes- Conjunctivae pink, anicteric  Throat- Oral mucosa pink and moist  Neck-  Unable to assess   CHEST: Chest symmetrical and non-tender to palpation. No accessory muscle use or intercostal retractions  RESPIRATORY: Lung sounds - Coarse rhonchi with expiratory wheeze. On Supplemental O2.   CARDIOVASCULAR:     Heart Inspection- shows no noted pulsations  Heart Palpation- no heaves or thrills; PMI is non-displaced   Heart Ausculation- Regular rate and rhythm, no murmur. No s3, s4 or rub   PV: No lower extremity edema. No varicosities. Pedal pulses palpable, no clubbing or cyanosis   ABDOMEN: Soft, obese,  non-tender to light palpation. Bowel sounds present. No palpable masses no organomegaly; no abdominal bruit  MS: Good muscle strength and tone. No atrophy or abnormal movements. : Deferred  SKIN: Warm and dry no statis dermatitis or ulcers   NEURO / PSYCH: Oriented to person, place and time. Speech clear and appropriate. Follows all commands. Flat affect.      DATA:    EC2022 NSR, cRBBB, rate 94 bpm.  Tele strips: SR, HR 60 bpm.   Diagnostic:      Intake/Output Summary (Last 24 hours) at 4/15/2022 1809  Last data filed at 4/15/2022 1605  Gross per 24 hour   Intake 620 ml   Output 3150 ml   Net -2530 ml       Labs:   CBC:   Recent Labs     22  0538 04/15/22  0530 WBC 7.9 9.2   HGB 13.2 13.2   HCT 41.7 41.2    244     BMP:   Recent Labs     04/14/22  0538 04/15/22  0530    140   K 4.5 4.8   CO2 31* 29   BUN 38* 50*   CREATININE 1.5* 1.4*   LABGLOM 42 45   CALCIUM 8.8 9.3     Mag:   Recent Labs     04/14/22  0538 04/15/22  0530   MG 1.8 1.7     Phos: No results for input(s): PHOS in the last 72 hours. TFT:   Lab Results   Component Value Date    TSH 0.646 02/05/2021    X8PBUNX 7.9 11/07/2017    T4FREE 1.34 12/01/2016      HgA1c:   Lab Results   Component Value Date    LABA1C 6.9 (H) 07/16/2021     No results found for: EAG  proBNP:   No results for input(s): PROBNP in the last 72 hours. CARDIAC ENZYMES:  No results for input(s): CKTOTAL, CKMB, CKMBINDEX, TROPHS in the last 72 hours. FASTING LIPID PANEL:  Lab Results   Component Value Date    CHOL 129 04/12/2022    HDL 32 04/12/2022    LDLCALC 74 04/12/2022    TRIG 114 04/12/2022     LIVER PROFILE:  No results for input(s): AST, ALT, LABALBU in the last 72 hours. CXR: 4/11/2022:  Impression   Bilateral airspace disease likely related to cardiogenic edema.  See above. Transthoracic echocardiogram April 13, 2022,  Summary   Technically difficult examination. Severe concentric left ventricular hypertrophy. Left ventricular diastolic filling is elevated . Ejection fraction is visually estimated at 50 to 55%. Normal right ventricular size and function. Mild mitral regurgitation is present. Mild tricuspid regurgitation. Assessment/Plan:   1. Acute hypoxic respiratory failure: On supplemental oxygen. History of chronic bronchitis secondary to tobacco use and COPD exacerbation. Management per pulmonology and primary service    2. Acute on chronic heart failure with improved ejection fraction  · LVEF 35-45% on TTE 1/2017, LVEF 65% on TTE 6/5/2018, LVEF 35-60% on TTE 6/11/2021.   Echocardiogram on this admission revealed EF of 50 to 55%  We will arrange for stress test tomorrow  Follow low-salt diet and maintain good blood pressure control  Hold Entresto and spironolactone and continue on Imdur and hydralazine and uptitrate for optimal blood pressure control. She will need outpatient cardiac amyloid studies once discharge    3. ACC stage C/NYHA class II-III  As mentioned above  4. Severe left ventricular concentric hypertrophy  Outpatient cardiac amyloid studies with [facility] when discharged    5. History of CAD status post CABG in 2011 (LIMA-LAD, SVG-OM1). Continue beta-blocker, statin and aspirin  Lexiscan myocardial perfusion stress test on Saturday. 6. Borderline elevated hs-cTnT (12>>10): pattern not consistent with ACS. CP free with no EKG changes. Awaiting echocardiogram to guide therapy    7. Obesity: BMI 38.59 kg/m2  8. Acute on chronic kidney disease  Avoid nephrotoxic agents and consider nephrology consultation. 9. DAYAN: On trilogy (unable to tolerate CPAP)  10. Hypertension/LVH: /66 - 132/59   11. Sinus bradycardia   12. Hyperlipidemia: intolerant to Lipitor / Tolerates Zocor 20 mg QD. 13. Type 2 diabetes mellitus  14. Tobacco and marijuana use  Smoking cessation was recommended  15. PAD status post left popliteal and tibial atherectomy/angioplasty  16. Chronic right bundle branch block  17. History of Covid-19 infection (4/2021)  18. Hyperkalemia   19.  Bladder incontinent  Management per primary service

## 2022-04-16 ENCOUNTER — APPOINTMENT (OUTPATIENT)
Dept: NUCLEAR MEDICINE | Age: 67
DRG: 291 | End: 2022-04-16
Payer: MEDICARE

## 2022-04-16 LAB
ANION GAP SERPL CALCULATED.3IONS-SCNC: 10 MMOL/L (ref 7–16)
BASOPHILS ABSOLUTE: 0.01 E9/L (ref 0–0.2)
BASOPHILS RELATIVE PERCENT: 0.1 % (ref 0–2)
BUN BLDV-MCNC: 58 MG/DL (ref 6–23)
CALCIUM SERPL-MCNC: 10 MG/DL (ref 8.6–10.2)
CHLORIDE BLD-SCNC: 91 MMOL/L (ref 98–107)
CO2: 32 MMOL/L (ref 22–29)
CREAT SERPL-MCNC: 1.7 MG/DL (ref 0.5–1)
EOSINOPHILS ABSOLUTE: 0 E9/L (ref 0.05–0.5)
EOSINOPHILS RELATIVE PERCENT: 0 % (ref 0–6)
GFR AFRICAN AMERICAN: 36
GFR NON-AFRICAN AMERICAN: 36 ML/MIN/1.73
GLUCOSE BLD-MCNC: 191 MG/DL (ref 74–99)
HCT VFR BLD CALC: 43.3 % (ref 34–48)
HEMOGLOBIN: 14 G/DL (ref 11.5–15.5)
IMMATURE GRANULOCYTES #: 0.04 E9/L
IMMATURE GRANULOCYTES %: 0.4 % (ref 0–5)
LV EF: 40 %
LVEF MODALITY: NORMAL
LYMPHOCYTES ABSOLUTE: 0.86 E9/L (ref 1.5–4)
LYMPHOCYTES RELATIVE PERCENT: 8.1 % (ref 20–42)
MAGNESIUM: 1.7 MG/DL (ref 1.6–2.6)
MCH RBC QN AUTO: 31.7 PG (ref 26–35)
MCHC RBC AUTO-ENTMCNC: 32.3 % (ref 32–34.5)
MCV RBC AUTO: 98.2 FL (ref 80–99.9)
METER GLUCOSE: 172 MG/DL (ref 74–99)
METER GLUCOSE: 263 MG/DL (ref 74–99)
METER GLUCOSE: 269 MG/DL (ref 74–99)
MONOCYTES ABSOLUTE: 0.48 E9/L (ref 0.1–0.95)
MONOCYTES RELATIVE PERCENT: 4.5 % (ref 2–12)
NEUTROPHILS ABSOLUTE: 9.19 E9/L (ref 1.8–7.3)
NEUTROPHILS RELATIVE PERCENT: 86.9 % (ref 43–80)
PDW BLD-RTO: 11.4 FL (ref 11.5–15)
PLATELET # BLD: 257 E9/L (ref 130–450)
PMV BLD AUTO: 10.3 FL (ref 7–12)
POTASSIUM SERPL-SCNC: 4.3 MMOL/L (ref 3.5–5)
RBC # BLD: 4.41 E12/L (ref 3.5–5.5)
SODIUM BLD-SCNC: 133 MMOL/L (ref 132–146)
WBC # BLD: 10.6 E9/L (ref 4.5–11.5)

## 2022-04-16 PROCEDURE — 94640 AIRWAY INHALATION TREATMENT: CPT

## 2022-04-16 PROCEDURE — 6370000000 HC RX 637 (ALT 250 FOR IP): Performed by: PHYSICIAN ASSISTANT

## 2022-04-16 PROCEDURE — 78452 HT MUSCLE IMAGE SPECT MULT: CPT

## 2022-04-16 PROCEDURE — 36415 COLL VENOUS BLD VENIPUNCTURE: CPT

## 2022-04-16 PROCEDURE — 99233 SBSQ HOSP IP/OBS HIGH 50: CPT | Performed by: INTERNAL MEDICINE

## 2022-04-16 PROCEDURE — 94660 CPAP INITIATION&MGMT: CPT

## 2022-04-16 PROCEDURE — 93016 CV STRESS TEST SUPVJ ONLY: CPT | Performed by: INTERNAL MEDICINE

## 2022-04-16 PROCEDURE — 3430000000 HC RX DIAGNOSTIC RADIOPHARMACEUTICAL: Performed by: RADIOLOGY

## 2022-04-16 PROCEDURE — 6370000000 HC RX 637 (ALT 250 FOR IP): Performed by: INTERNAL MEDICINE

## 2022-04-16 PROCEDURE — 78452 HT MUSCLE IMAGE SPECT MULT: CPT | Performed by: INTERNAL MEDICINE

## 2022-04-16 PROCEDURE — 83735 ASSAY OF MAGNESIUM: CPT

## 2022-04-16 PROCEDURE — 6360000002 HC RX W HCPCS: Performed by: INTERNAL MEDICINE

## 2022-04-16 PROCEDURE — A9500 TC99M SESTAMIBI: HCPCS | Performed by: RADIOLOGY

## 2022-04-16 PROCEDURE — 93018 CV STRESS TEST I&R ONLY: CPT | Performed by: INTERNAL MEDICINE

## 2022-04-16 PROCEDURE — 80048 BASIC METABOLIC PNL TOTAL CA: CPT

## 2022-04-16 PROCEDURE — 82962 GLUCOSE BLOOD TEST: CPT

## 2022-04-16 PROCEDURE — 85025 COMPLETE CBC W/AUTO DIFF WBC: CPT

## 2022-04-16 PROCEDURE — 2140000000 HC CCU INTERMEDIATE R&B

## 2022-04-16 PROCEDURE — 2580000003 HC RX 258: Performed by: PHYSICIAN ASSISTANT

## 2022-04-16 PROCEDURE — 2700000000 HC OXYGEN THERAPY PER DAY

## 2022-04-16 PROCEDURE — 93017 CV STRESS TEST TRACING ONLY: CPT

## 2022-04-16 PROCEDURE — 6360000002 HC RX W HCPCS: Performed by: PHYSICIAN ASSISTANT

## 2022-04-16 PROCEDURE — S5553 INSULIN LONG ACTING 5 U: HCPCS | Performed by: INTERNAL MEDICINE

## 2022-04-16 RX ORDER — CYCLOBENZAPRINE HCL 10 MG
5 TABLET ORAL 3 TIMES DAILY PRN
Status: DISCONTINUED | OUTPATIENT
Start: 2022-04-16 | End: 2022-04-19 | Stop reason: HOSPADM

## 2022-04-16 RX ORDER — PREDNISONE 20 MG/1
20 TABLET ORAL DAILY
Status: DISCONTINUED | OUTPATIENT
Start: 2022-04-17 | End: 2022-04-18

## 2022-04-16 RX ADMIN — TRAZODONE HYDROCHLORIDE 100 MG: 50 TABLET ORAL at 21:19

## 2022-04-16 RX ADMIN — ENOXAPARIN SODIUM 40 MG: 100 INJECTION SUBCUTANEOUS at 09:29

## 2022-04-16 RX ADMIN — PREDNISONE 40 MG: 20 TABLET ORAL at 09:30

## 2022-04-16 RX ADMIN — ISOSORBIDE MONONITRATE 60 MG: 60 TABLET, EXTENDED RELEASE ORAL at 09:30

## 2022-04-16 RX ADMIN — INSULIN LISPRO 6 UNITS: 100 INJECTION, SOLUTION INTRAVENOUS; SUBCUTANEOUS at 14:35

## 2022-04-16 RX ADMIN — Medication 12 MILLICURIE: at 10:52

## 2022-04-16 RX ADMIN — CYCLOBENZAPRINE 5 MG: 10 TABLET, FILM COATED ORAL at 21:19

## 2022-04-16 RX ADMIN — MONTELUKAST SODIUM 10 MG: 10 TABLET ORAL at 21:18

## 2022-04-16 RX ADMIN — GABAPENTIN 100 MG: 100 CAPSULE ORAL at 21:19

## 2022-04-16 RX ADMIN — DOXYCYCLINE HYCLATE 100 MG: 100 CAPSULE ORAL at 21:18

## 2022-04-16 RX ADMIN — INSULIN GLARGINE-YFGN 25 UNITS: 100 INJECTION, SOLUTION SUBCUTANEOUS at 21:29

## 2022-04-16 RX ADMIN — Medication 36.6 MILLICURIE: at 12:55

## 2022-04-16 RX ADMIN — IPRATROPIUM BROMIDE AND ALBUTEROL SULFATE 3 ML: .5; 2.5 SOLUTION RESPIRATORY (INHALATION) at 07:54

## 2022-04-16 RX ADMIN — HYDRALAZINE HYDROCHLORIDE 50 MG: 25 TABLET, FILM COATED ORAL at 09:30

## 2022-04-16 RX ADMIN — OXYBUTYNIN CHLORIDE 5 MG: 5 TABLET ORAL at 21:19

## 2022-04-16 RX ADMIN — CARVEDILOL 25 MG: 25 TABLET, FILM COATED ORAL at 09:30

## 2022-04-16 RX ADMIN — IPRATROPIUM BROMIDE AND ALBUTEROL SULFATE 3 ML: .5; 2.5 SOLUTION RESPIRATORY (INHALATION) at 17:15

## 2022-04-16 RX ADMIN — BUMETANIDE 2 MG: 1 TABLET ORAL at 09:30

## 2022-04-16 RX ADMIN — REGADENOSON 0.4 MG: 0.08 INJECTION, SOLUTION INTRAVENOUS at 11:53

## 2022-04-16 RX ADMIN — IPRATROPIUM BROMIDE AND ALBUTEROL SULFATE 3 ML: .5; 2.5 SOLUTION RESPIRATORY (INHALATION) at 20:55

## 2022-04-16 RX ADMIN — INSULIN LISPRO 4 UNITS: 100 INJECTION, SOLUTION INTRAVENOUS; SUBCUTANEOUS at 14:37

## 2022-04-16 RX ADMIN — CARVEDILOL 25 MG: 25 TABLET, FILM COATED ORAL at 16:35

## 2022-04-16 RX ADMIN — ASPIRIN 81 MG 81 MG: 81 TABLET ORAL at 09:30

## 2022-04-16 RX ADMIN — DULOXETINE HYDROCHLORIDE 60 MG: 60 CAPSULE, DELAYED RELEASE ORAL at 21:18

## 2022-04-16 RX ADMIN — DOXYCYCLINE HYCLATE 100 MG: 100 CAPSULE ORAL at 09:30

## 2022-04-16 RX ADMIN — FAMOTIDINE 20 MG: 20 TABLET ORAL at 16:35

## 2022-04-16 RX ADMIN — HYDRALAZINE HYDROCHLORIDE 50 MG: 25 TABLET, FILM COATED ORAL at 14:28

## 2022-04-16 RX ADMIN — DULOXETINE HYDROCHLORIDE 60 MG: 60 CAPSULE, DELAYED RELEASE ORAL at 09:30

## 2022-04-16 RX ADMIN — INSULIN LISPRO 4 UNITS: 100 INJECTION, SOLUTION INTRAVENOUS; SUBCUTANEOUS at 16:37

## 2022-04-16 RX ADMIN — CETIRIZINE HYDROCHLORIDE 10 MG: 10 TABLET, FILM COATED ORAL at 09:30

## 2022-04-16 RX ADMIN — HYDRALAZINE HYDROCHLORIDE 50 MG: 25 TABLET, FILM COATED ORAL at 21:19

## 2022-04-16 RX ADMIN — GABAPENTIN 100 MG: 100 CAPSULE ORAL at 09:30

## 2022-04-16 RX ADMIN — PANTOPRAZOLE SODIUM 40 MG: 40 TABLET, DELAYED RELEASE ORAL at 09:31

## 2022-04-16 RX ADMIN — FLUTICASONE PROPIONATE 2 SPRAY: 50 SPRAY, METERED NASAL at 09:29

## 2022-04-16 RX ADMIN — HYDROCODONE BITARTRATE AND ACETAMINOPHEN 1 TABLET: 5; 325 TABLET ORAL at 14:28

## 2022-04-16 RX ADMIN — INSULIN LISPRO 1 UNITS: 100 INJECTION, SOLUTION INTRAVENOUS; SUBCUTANEOUS at 21:29

## 2022-04-16 RX ADMIN — ATORVASTATIN CALCIUM 10 MG: 10 TABLET, FILM COATED ORAL at 21:18

## 2022-04-16 RX ADMIN — SODIUM CHLORIDE, PRESERVATIVE FREE 10 ML: 5 INJECTION INTRAVENOUS at 09:29

## 2022-04-16 RX ADMIN — OXYBUTYNIN CHLORIDE 5 MG: 5 TABLET ORAL at 09:30

## 2022-04-16 RX ADMIN — INSULIN LISPRO 6 UNITS: 100 INJECTION, SOLUTION INTRAVENOUS; SUBCUTANEOUS at 16:35

## 2022-04-16 ASSESSMENT — PAIN DESCRIPTION - PROGRESSION
CLINICAL_PROGRESSION: NOT CHANGED
CLINICAL_PROGRESSION: NOT CHANGED

## 2022-04-16 ASSESSMENT — PAIN DESCRIPTION - LOCATION
LOCATION: BACK
LOCATION: BACK;LEG

## 2022-04-16 ASSESSMENT — PAIN SCALES - GENERAL
PAINLEVEL_OUTOF10: 9
PAINLEVEL_OUTOF10: 9
PAINLEVEL_OUTOF10: 0
PAINLEVEL_OUTOF10: 4
PAINLEVEL_OUTOF10: 9

## 2022-04-16 ASSESSMENT — PAIN DESCRIPTION - FREQUENCY
FREQUENCY: INTERMITTENT
FREQUENCY: CONTINUOUS

## 2022-04-16 ASSESSMENT — PAIN DESCRIPTION - PAIN TYPE
TYPE: CHRONIC PAIN
TYPE: CHRONIC PAIN

## 2022-04-16 ASSESSMENT — PAIN DESCRIPTION - ONSET
ONSET: ON-GOING
ONSET: GRADUAL

## 2022-04-16 ASSESSMENT — PAIN DESCRIPTION - DESCRIPTORS
DESCRIPTORS: ACHING;DISCOMFORT;SORE
DESCRIPTORS: ACHING;DISCOMFORT;CONSTANT

## 2022-04-16 ASSESSMENT — PAIN DESCRIPTION - ORIENTATION
ORIENTATION: RIGHT;LOWER
ORIENTATION: LOWER;MID

## 2022-04-16 NOTE — PROGRESS NOTES
Attempted to see twice, off the floor for stress testing.  Will follow up in the am.     Hollis Sigala, APRN - CNP

## 2022-04-16 NOTE — PROGRESS NOTES
Date: 4/15/2022    Time: 10:28 PM    Patient Placed On BIPAP/CPAP/ Non-Invasive Ventilation? Yes    If no must comment. Facial area red/color change? No          If YES are Blister/Lesion present? If yes must notify nursing staff  BIPAP/CPAP skin barrier?  Yes   Skin barrier type: Mepilex      Comments:        Elisabeth Chowdary RCP

## 2022-04-16 NOTE — PROGRESS NOTES
Attempted to rotate IV site by 2 RN's with no success. Patient has poor venous access. Will pass this information along in report to oncoming RN.

## 2022-04-16 NOTE — PROCEDURES
[facility] Nuclear Stress Test:    Cardiologist: Dr. Richmond Braga MD    Date: 4/16/2022    Indications for study: Chest pain    1. No chest pain  2. No new arrhythmias  3. No EKG changes suggestive of stress induced ischemia  4.  Nuclear images pending    Richmond Braga MD  [facility]) Cardiology

## 2022-04-16 NOTE — PROGRESS NOTES
Chief Complaint:  Chief Complaint   Patient presents with    Respiratory Distress     increased SOB the last 4 days. was 90% on trilogy oxygen concentrator at home. EMS placed on CPAP, arrived at 95%, hx of COPD      Acute on chronic diastolic (congestive) heart failure (HCC)     Subjective:    Breathing a little better  Complains of leg spasm  Asking for muscle relaxer    Objective:    /80   Pulse 69   Temp 97.3 °F (36.3 °C) (Temporal)   Resp 18   Ht 5' 2\" (1.575 m)   Wt 209 lb 8 oz (95 kg)   LMP 01/01/1990   SpO2 100%   BMI 38.32 kg/m²     Current medications that patient is taking have been reviewed. General appearance: NAD, conversant, obese, very chronically ill appearing but comfortable/nontoxic appearing  HEENT: AT/NC, MMM  Neck: FROM, supple  Lungs: Much improved breath sounds. Clearer with minimal wheezing.   WOB normal.  CV: RRR, no MRGs  Abdomen: Soft, non-tender; no masses or HSM, +BS  Extremities: No peripheral edema or digital cyanosis  Skin: no rash, lesions or ulcers  Psych: Calm and cooperative  Neuro: Alert and interactive, face symmetric, moving all extremities, speech fluent    Labs:  CBC with Differential:    Lab Results   Component Value Date    WBC 10.6 04/16/2022    RBC 4.41 04/16/2022    HGB 14.0 04/16/2022    HCT 43.3 04/16/2022     04/16/2022    MCV 98.2 04/16/2022    MCH 31.7 04/16/2022    MCHC 32.3 04/16/2022    RDW 11.4 04/16/2022    NRBC 0.0 04/22/2021    SEGSPCT 84 03/03/2014    LYMPHOPCT 8.1 04/16/2022    MONOPCT 4.5 04/16/2022    BASOPCT 0.1 04/16/2022    MONOSABS 0.48 04/16/2022    LYMPHSABS 0.86 04/16/2022    EOSABS 0.00 04/16/2022    BASOSABS 0.01 04/16/2022     CMP:    Lab Results   Component Value Date     04/16/2022    K 4.3 04/16/2022    K 4.9 04/11/2022    CL 91 04/16/2022    CO2 32 04/16/2022    BUN 58 04/16/2022    CREATININE 1.7 04/16/2022    GFRAA 36 04/16/2022    LABGLOM 36 04/16/2022    GLUCOSE 191 04/16/2022    GLUCOSE 181 12/01/2011 PROT 6.3 04/11/2022    LABALBU 3.3 04/11/2022    LABALBU 5.2 12/01/2011    CALCIUM 10.0 04/16/2022    BILITOT 0.5 04/11/2022    ALKPHOS 71 04/11/2022    AST 18 04/11/2022    ALT 15 04/11/2022            Assessment/Plan:  Principal Problem:    Acute on chronic diastolic (congestive) heart failure ([facility])  Active Problems:    Chronic respiratory failure with hypoxia and hypercapnia ([facility])    DAYAN and COPD overlap syndrome ([facility])    DM2 (diabetes mellitus, type 2) ([facility])    Severe obesity (BMI 35.0-35.9 with comorbidity) ([facility]    Encounter for tobacco use cessation counseling    Prolonged Q-T interval on ECG    COPD exacerbation ([facility])    Respiratory distress    CKD (chronic kidney disease) stage 3, GFR 30-59 ml/min ([facility])    Hypertensive emergency    Acute kidney injury superimposed on CKD ([facility]  Resolved Problems:    * No resolved hospital problems. *       ALEC on CKD stable    Trial small dose flexeril. Not planned for chronic use though. She has way too much medication already. She tends to have a different complaint each day and I think this is how she's ending up on so much medication. Diuretic per nephrology    Duonebs, prednisone for COPD exacerbation.   Still smoking    Glucose still a bit high, reduce prednisone, increase meal associated insulin    Continue to hold spironolactone, entresto    Stress test pending    DVT prophylaxis: Lovenox  Requires continued inpatient level of care     Ramez Arreguin MD    5:10 PM  4/16/2022

## 2022-04-16 NOTE — PLAN OF CARE
Problem: Pain:  Goal: Pain level will decrease  4/16/2022 1816 by Rachel Geller RN  Outcome: Met This Shift  4/16/2022 1108 by Rachel Geller RN  Outcome: Ongoing  Goal: Control of acute pain  4/16/2022 1816 by Rachel Geller RN  Outcome: Met This Shift  4/16/2022 1108 by Rachel Geller RN  Outcome: Ongoing  Goal: Control of chronic pain  4/16/2022 1816 by Rachel Geller RN  Outcome: Met This Shift  4/16/2022 1108 by Rachel Geller RN  Outcome: Ongoing     Problem: Falls - Risk of:  Goal: Will remain free from falls  4/16/2022 1816 by Rachel Geller RN  Outcome: Met This Shift  4/16/2022 1108 by Rachel Geller RN  Outcome: Ongoing  Goal: Absence of physical injury  4/16/2022 1816 by Rachel Geller RN  Outcome: Met This Shift  4/16/2022 1108 by Rachel Geller RN  Outcome: Ongoing     Problem: Musculor/Skeletal Functional Status  Goal: Highest potential functional level  4/16/2022 1816 by Rachel Geller RN  Outcome: Met This Shift  4/16/2022 1108 by Rachel Geller RN  Outcome: Ongoing  Goal: Absence of falls  4/16/2022 1816 by Rachel Geller RN  Outcome: Met This Shift  4/16/2022 1108 by Rachel Geller RN  Outcome: Ongoing

## 2022-04-16 NOTE — PROGRESS NOTES
//      Inpatient Cardiology Consultation follow-up visit    Reason for Consult:  CHF    Consulting Physician: Dr. Jeanne Call    Requesting Physician:  TONY Nelson    Date of Consultation: 4/16/2022    Subjective: Stress test is abnormal.  We will discuss invasive coronary angiogram with patient tomorrow. Past Medical History:  (Please note the following information was obtained from consult note on 7/16/2021 when additional information added). 1. Morbid obesity  2. Hypertension  3. Hyperlipidemia. Currently not on a statin but has been intolerant of Lipitor  4. Current tobacco abuse  5. Diabetes which is insulin requiring , diabetic neuropathy   6. Obstructive sleep apnea, states compliance with Cpap  7. Ischemic cardiomyopathy with a CABG in 2011 with a LIMA to the LAD and a saphenous vein graft to the marginal last cardiac catheterization was in April 2015 and the LIMA was unable to be visualized and the saphenous vein graft to the obtuse marginal was patent.   8. 2-D echocardiogram January 4, 2017, EF 35-45% and diffuse hypokinesis of the left ventricle and left atrium is dilatedAortic valve appears mildly sclerotic and there is mild mitral regurgitation  9. NSTEMI: 6/6/2018 (troponin 0.35, 0.32, 0.48) no rise in CK or CK-MB.  Creatinine 1.0.  Per cardiology, probable recent NSTEMI, type II low risk noninvasive 6/5/2018.  Patient was diuresed approximately 10 L --> And discharged home on loop diuretic. 10. Lexiscan MPS: 6/5/2018: Pharmacologically induced perfusion defect involving the lateral wall, without evidence of reversibility, diffusely hypokinetic left ventricular wall motion with a focal area of akinesis involving the mid lateral wall, LVEF 42%. 11. TTE: 6/5/2018: LVEF 65% by biplane, stage II diastolic dysfunction, moderate LVH, no wall motion abnormality, normal RV function, no pericardial effusion.   12. TTE 06/11/2021 (Dr. Gracy Aleman): Left ventricle size is normal. Ejection fraction is visually estimated at 55-60%. No regional wall motion abnormalities seen. Severe concentric left ventricular hypertrophy. Indeterminate diastolic function. Normal right ventricular size. Right ventricle global systolic function is mildly reduced . TAPSE 15 mm.  No hemodynamically significant aortic stenosis is present. Physiologic and/or trace tricuspid regurgitation. RVSP is 26 mmHg. Normal estimated PA systolic pressure. No evidence for hemodynamically significant pericardial effusion. 13. Chronic RBBB    14. IV dye, Lipitor, Lasix, Norvasc allergies: Unknown reaction per patient  15. C. diff 2015  16. Asthma status post bronchial brush biopsy in January 2013   17. Osteoarthritis. And left knee replacement  18. Hx Liver biopsy. Hepatic encephalopathy  19. S/p Bilateral breast reduction   20. Diverticulosis. 21. Hyperplastic colon polyps and fatty liver  22. Hx Ventral hernia  23. Glaucoma  24. Hx Anemia and blood transfusion  25. Hx Pneumonia  26. Depression  27. S/p Cholecystectomy   28. Patient follows with neurology at [facility] and was diagnosed with chronic pain syndrome and is recommending chronic pain management. 29. History of severe peripheral vascular disease with history of left popliteal and tibial trunk atherectomy on 11/13/2018 was initiated on aspirin and Plavix. 27. COVID-19 infection (April 2021)  31. Hospital admission 7/15/2021-7/21/2021: Admitted for hypoxia /patient was diagnosed with Covid-19 infection 4/2021 and now requiring oxygen. Treated for acute hypoxic respiratory failure secondary to acute COPD exacerbation and acute on chronic HFpEF  32. 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 Medications Prior to admit:  Prior to Admission medications    Medication Sig Start Date End Date Taking?  Authorizing Provider   acetaminophen (TYLENOL) 325 MG tablet Take 650 mg by mouth every 6 hours as needed for Pain   Yes Historical Provider, MD aspirin 81 MG chewable tablet Take 81 mg by mouth daily   Yes Historical Provider, MD   Arformoterol Tartrate (BROVANA) 15 MCG/2ML NEBU Take 1 ampule by nebulization 2 times daily as needed   Yes Historical Provider, MD   azelastine (ASTELIN) 0.1 % nasal spray 1 spray by Nasal route 2 times daily as needed for Rhinitis   Yes Historical Provider, MD   cetirizine (ZYRTEC) 10 MG tablet Take 10 mg by mouth daily   Yes Historical Provider, MD   DULoxetine (CYMBALTA) 60 MG extended release capsule Take 60 mg by mouth 2 times daily   Yes Historical Provider, MD   sacubitril-valsartan (ENTRESTO) 49-51 MG per tablet Take 1 tablet by mouth 2 times daily   Yes Historical Provider, MD   famotidine (PEPCID) 40 MG tablet Take 40 mg by mouth Daily with supper   Yes Historical Provider, MD   fluticasone (FLONASE) 50 MCG/ACT nasal spray 2 sprays by Nasal route daily   Yes Historical Provider, MD   gabapentin (NEURONTIN) 100 MG capsule Take 100 mg by mouth 2 times daily.    Yes Historical Provider, MD   Insulin Degludec (TRESIBA FLEXTOUCH) 200 UNIT/ML SOPN Inject 50 Units into the skin at bedtime   Yes Historical Provider, MD   insulin lispro, 1 Unit Dial, (HUMALOG KWIKPEN) 100 UNIT/ML SOPN Inject 0-6 Units into the skin 3 times daily (before meals) *Per Sliding Scale*   Yes Historical Provider, MD   ipratropium-albuterol (DUONEB) 0.5-2.5 (3) MG/3ML SOLN nebulizer solution Take 1 vial by nebulization every 4 hours as needed for Shortness of Breath   Yes Historical Provider, MD   isosorbide mononitrate (IMDUR) 60 MG extended release tablet Take 60 mg by mouth daily   Yes Historical Provider, MD   montelukast (SINGULAIR) 10 MG tablet Take 10 mg by mouth nightly   Yes Historical Provider, MD   mirabegron (MYRBETRIQ) 50 MG TB24 Take 50 mg by mouth at bedtime   Yes Historical Provider, MD   simvastatin (ZOCOR) 20 MG tablet Take 20 mg by mouth nightly   Yes Historical Provider, MD   tiotropium (SPIRIVA RESPIMAT) 1.25 MCG/ACT AERS inhaler Inhale 2 puffs into the lungs daily   Yes Historical Provider, MD   traZODone (DESYREL) 100 MG tablet Take 100 mg by mouth nightly   Yes Historical Provider, MD   vitamin D (CHOLECALCIFEROL) 25 MCG (1000 UT) TABS tablet Take 1,000 Units by mouth daily   Yes Historical Provider, MD   bumetanide (BUMEX) 1 MG tablet Take 1 tablet by mouth daily 10/25/21   Payam Sanchez MD   spironolactone (ALDACTONE) 25 MG tablet Take 1 tablet by mouth daily 9/15/21   Payam Sanchez MD   carvedilol (COREG) 25 MG tablet Take 1 tablet by mouth 2 times daily (with meals) 9/15/21   Payam Sanchez MD   hydrALAZINE (APRESOLINE) 50 MG tablet Take 50 mg by mouth 3 times daily    Historical Provider, MD   pantoprazole (PROTONIX) 40 MG tablet Take 40 mg by mouth daily  6/21/21   Historical Provider, MD   metoclopramide (REGLAN) 5 MG tablet Take 5 mg by mouth 3 times daily as needed  5/5/21   [facility] Freddy   docusate sodium (COLACE) 100 MG capsule Take 200 mg by mouth at bedtime  3/11/21   Historical Provider, MD       Current Medications:    Current Facility-Administered Medications: cyclobenzaprine (FLEXERIL) tablet 5 mg, 5 mg, Oral, TID PRN  [START ON 4/17/2022] predniSONE (DELTASONE) tablet 20 mg, 20 mg, Oral, Daily  [START ON 4/17/2022] insulin lispro (HUMALOG) injection vial 6 Units, 6 Units, SubCUTAneous, TID WC  hydrocortisone 2.5 % cream, , Topical, BID  bumetanide (BUMEX) tablet 2 mg, 2 mg, Oral, Daily  oxybutynin (DITROPAN) tablet 5 mg, 5 mg, Oral, BID  guaiFENesin (ROBITUSSIN) 100 MG/5ML oral solution 200 mg, 200 mg, Oral, Q6H PRN  insulin glargine-yfgn (SEMGLEE-YFGN) injection vial 25 Units, 25 Units, SubCUTAneous, Nightly  lidocaine 4 % external patch 1 patch, 1 patch, TransDERmal, Daily  doxycycline hyclate (VIBRAMYCIN) capsule 100 mg, 100 mg, Oral, 2 times per day  HYDROcodone-acetaminophen (NORCO) 5-325 MG per tablet 1 tablet, 1 tablet, Oral, Q6H PRN  nicotine (NICODERM CQ) 14 MG/24HR 1 patch, 1 patch, TransDERmal, Daily  perflutren lipid microspheres (DEFINITY) injection 1.65 mg, 1.5 mL, IntraVENous, ONCE PRN  labetalol (NORMODYNE;TRANDATE) injection 10 mg, 10 mg, IntraVENous, Once  Arformoterol Tartrate (BROVANA) nebulizer solution 15 mcg, 15 mcg, Nebulization, BID PRN  aspirin chewable tablet 81 mg, 81 mg, Oral, Daily  carvedilol (COREG) tablet 25 mg, 25 mg, Oral, BID WC  cetirizine (ZYRTEC) tablet 10 mg, 10 mg, Oral, Daily  DULoxetine (CYMBALTA) extended release capsule 60 mg, 60 mg, Oral, BID  famotidine (PEPCID) tablet 20 mg, 20 mg, Oral, Dinner  fluticasone (FLONASE) 50 MCG/ACT nasal spray 2 spray, 2 spray, Nasal, Daily  gabapentin (NEURONTIN) capsule 100 mg, 100 mg, Oral, BID  hydrALAZINE (APRESOLINE) tablet 50 mg, 50 mg, Oral, TID  ipratropium-albuterol (DUONEB) nebulizer solution 3 mL, 1 vial, Nebulization, Q4H WA  isosorbide mononitrate (IMDUR) extended release tablet 60 mg, 60 mg, Oral, Daily  mirabegron (MYRBETRIQ) extended release tablet 50 mg, 50 mg, Oral, Nightly  montelukast (SINGULAIR) tablet 10 mg, 10 mg, Oral, Nightly  pantoprazole (PROTONIX) tablet 40 mg, 40 mg, Oral, Daily  [Held by provider] sacubitril-valsartan (ENTRESTO) 49-51 MG per tablet 1 tablet, 1 tablet, Oral, BID  atorvastatin (LIPITOR) tablet 10 mg, 10 mg, Oral, Nightly  [Held by provider] spironolactone (ALDACTONE) tablet 25 mg, 25 mg, Oral, Daily  traZODone (DESYREL) tablet 100 mg, 100 mg, Oral, Nightly  sodium chloride flush 0.9 % injection 5-40 mL, 5-40 mL, IntraVENous, 2 times per day  sodium chloride flush 0.9 % injection 5-40 mL, 5-40 mL, IntraVENous, PRN  0.9 % sodium chloride infusion, , IntraVENous, PRN  polyethylene glycol (GLYCOLAX) packet 17 g, 17 g, Oral, Daily PRN  acetaminophen (TYLENOL) tablet 650 mg, 650 mg, Oral, Q6H PRN **OR** acetaminophen (TYLENOL) suppository 650 mg, 650 mg, Rectal, Q6H PRN  enoxaparin (LOVENOX) injection 40 mg, 40 mg, SubCUTAneous, Daily  insulin lispro (HUMALOG) injection vial 0-12 Units, 0-12 Units, SubCUTAneous, TID WC  insulin lispro (HUMALOG) injection vial 0-6 Units, 0-6 Units, SubCUTAneous, Nightly  glucose (GLUTOSE) 40 % oral gel 15 g, 15 g, Oral, PRN  dextrose 50 % IV solution, 12.5 g, IntraVENous, PRN  glucagon (rDNA) injection 1 mg, 1 mg, IntraMUSCular, PRN  dextrose 5 % solution, 100 mL/hr, IntraVENous, PRN  magnesium sulfate 2000 mg in 50 mL IVPB premix, 2,000 mg, IntraVENous, PRN  potassium chloride (KLOR-CON M) extended release tablet 40 mEq, 40 mEq, Oral, PRN **OR** potassium bicarb-citric acid (EFFER-K) effervescent tablet 40 mEq, 40 mEq, Oral, PRN **OR** potassium chloride 10 mEq/100 mL IVPB (Peripheral Line), 10 mEq, IntraVENous, PRN  hydrALAZINE (APRESOLINE) injection 10 mg, 10 mg, IntraVENous, Q4H PRN    Allergies:  Latex, Bee venom, Dilaudid [hydromorphone hcl], Dye [iodides], Percocet [oxycodone-acetaminophen], Keflex [cephalexin], Lasix [furosemide], Levaquin [levofloxacin in d5w], Lipitor, Lyrica [pregabalin], Morphine, Naproxen, Nefazodone, Norvasc [amlodipine], Oxycodone-acetaminophen, Shellfish-derived products, and Trazodone and nefazodone    Social History:    Current smoker: 0.25 PPD x 40 years   Denies alcohol  Marijuana use: 1-2 times per month. Resides alone but has family near by to assist with getting food and taking her to her appointments. Family History: Noncontributory due to advanced age. REVIEW OF SYSTEMS:     · Constitutional: + fatigue, +fevers +chills. Denies  night sweats  · Eyes: Denies visual changes or drainage  · ENT: Denies headaches or hearing loss. No mouth sores or sore throat. No epistaxis   · Cardiovascular: See HPI. · Respiratory: +SAUNDERS, + cough, +orthopnea. Denies PND. No hemoptysis   · Gastrointestinal: Denies hematemesis or anorexia. No hematochezia or melena    · Genitourinary: Denies urgency, dysuria or hematuria.   · Musculoskeletal: Denies gait disturbance, weakness or joint complaints  · Integumentary: Denies rash, hives or pruritis · Neurological: Denies dizziness, headaches or seizures. No numbness or tingling  · Psychiatric: Denies anxiety or depression. · Endocrine: Denies temperature intolerance. No recent weight change. .  · Hematologic/Lymphatic: Denies abnormal bruising or bleeding. No swollen lymph nodes    PHYSICAL EXAM:   /80   Pulse 69   Temp 97.3 °F (36.3 °C) (Temporal)   Resp 18   Ht 5' 2\" (1.575 m)   Wt 209 lb 8 oz (95 kg)   LMP 1990   SpO2 96%   BMI 38.32 kg/m²   CONST:  Well developed, well nourished middle aged AA female who appears of stated age. Awake, alert and cooperative. No apparent distress. Dyspenic during conversation. HEENT:   Head- Normocephalic, atraumatic   Eyes- Conjunctivae pink, anicteric  Throat- Oral mucosa pink and moist  Neck-  Unable to assess   CHEST: Chest symmetrical and non-tender to palpation. No accessory muscle use or intercostal retractions  RESPIRATORY: Lung sounds - Coarse rhonchi with expiratory wheeze. On Supplemental O2.   CARDIOVASCULAR:     Heart Inspection- shows no noted pulsations  Heart Palpation- no heaves or thrills; PMI is non-displaced   Heart Ausculation- Regular rate and rhythm, no murmur. No s3, s4 or rub   PV: No lower extremity edema. No varicosities. Pedal pulses palpable, no clubbing or cyanosis   ABDOMEN: Soft, obese,  non-tender to light palpation. Bowel sounds present. No palpable masses no organomegaly; no abdominal bruit  MS: Good muscle strength and tone. No atrophy or abnormal movements. : Deferred  SKIN: Warm and dry no statis dermatitis or ulcers   NEURO / PSYCH: Oriented to person, place and time. Speech clear and appropriate. Follows all commands. Flat affect.      DATA:    EC2022 NSR, cRBBB, rate 94 bpm.  Tele strips: SR, HR 60 bpm.   Diagnostic:      Intake/Output Summary (Last 24 hours) at 2022  Last data filed at 2022 1706  Gross per 24 hour   Intake 480 ml   Output 1600 ml   Net -1120 ml       Labs:   CBC: Recent Labs     04/15/22  0530 04/16/22  0438   WBC 9.2 10.6   HGB 13.2 14.0   HCT 41.2 43.3    257     BMP:   Recent Labs     04/15/22  0530 04/16/22  0438    133   K 4.8 4.3   CO2 29 32*   BUN 50* 58*   CREATININE 1.4* 1.7*   LABGLOM 45 36   CALCIUM 9.3 10.0     Mag:   Recent Labs     04/15/22  0530 04/16/22  0438   MG 1.7 1.7     Phos: No results for input(s): PHOS in the last 72 hours. TFT:   Lab Results   Component Value Date    TSH 0.646 02/05/2021    P1DJUIK 7.9 11/07/2017    T4FREE 1.34 12/01/2016      HgA1c:   Lab Results   Component Value Date    LABA1C 6.9 (H) 07/16/2021     No results found for: EAG  proBNP:   No results for input(s): PROBNP in the last 72 hours. CARDIAC ENZYMES:  No results for input(s): CKTOTAL, CKMB, CKMBINDEX, TROPHS in the last 72 hours. FASTING LIPID PANEL:  Lab Results   Component Value Date    CHOL 129 04/12/2022    HDL 32 04/12/2022    LDLCALC 74 04/12/2022    TRIG 114 04/12/2022     LIVER PROFILE:  No results for input(s): AST, ALT, LABALBU in the last 72 hours. CXR: 4/11/2022:  Impression   Bilateral airspace disease likely related to cardiogenic edema.  See above. Transthoracic echocardiogram April 13, 2022,  Summary   Technically difficult examination. Severe concentric left ventricular hypertrophy. Left ventricular diastolic filling is elevated . Ejection fraction is visually estimated at 50 to 55%. Normal right ventricular size and function. Mild mitral regurgitation is present. Mild tricuspid regurgitation. Myocardial perfusion stress test April 16, 2022:    1: There is a medium size, moderate intensity scar in the inferior,   inferior lateral and lateral wall with partial reversibility.    2  The inferior and inferior lateral wall appears mildly hypokinetic   with estimated left ventricular ejection fraction of 40%   3: Low dose CT attenuation correction protocol was utilized for this   study   4: Please see separate report for EKG and hemodynamic aspect of the   stress test.   5: RISK SCAN: Intermediate risk scan         Assessment/Plan:   1. Acute hypoxic respiratory failure: On supplemental oxygen. History of chronic bronchitis secondary to tobacco use and COPD exacerbation. Management per pulmonology and primary service    2. Acute on chronic heart failure with improved ejection fraction  · LVEF 35-45% on TTE 1/2017, LVEF 65% on TTE 6/5/2018, LVEF 35-60% on TTE 6/11/2021. Echocardiogram on this admission revealed EF of 50 to 55%  Stress test is abnormal and will discuss invasive coronary angiogram with patient once kidney function is stable  Follow low-salt diet and maintain good blood pressure control  Hold Entresto and spironolactone and continue on Imdur and hydralazine and uptitrate for optimal blood pressure control. She will need outpatient cardiac amyloid studies once discharge    3. ACC stage C/NYHA class II-III  As mentioned above  4. Severe left ventricular concentric hypertrophy  Outpatient cardiac amyloid studies with [facility] when discharged    5. History of CAD status post CABG in 2011 (LIMA-LAD, SVG-OM1). Continue beta-blocker, statin and aspirin  Stress test is abnormal and we discussed invasive coronary angiogram with patient to be done after her kidney function is stable    6. Borderline elevated hs-cTnT (12>>10): pattern not consistent with ACS. CP free with no EKG changes. As mentioned above  7. Obesity: BMI 38.59 kg/m2  8. Acute on chronic kidney disease  Avoid nephrotoxic agents and consider nephrology consultation. 9. DAYAN: On trilogy (unable to tolerate CPAP)  10. Hypertension/LVH: /66 - 132/59   11. Sinus bradycardia   12. Hyperlipidemia: intolerant to Lipitor / Tolerates Zocor 20 mg QD. 13. Type 2 diabetes mellitus  14. Tobacco and marijuana use  Smoking cessation was recommended  15. PAD status post left popliteal and tibial atherectomy/angioplasty  16. Chronic right bundle branch block  17.  History of Covid-19 infection (4/2021)  18. Hyperkalemia   19.  Bladder incontinent  Management per primary service

## 2022-04-16 NOTE — PATIENT CARE CONFERENCE
[facility] Quality Flow/Interdisciplinary Rounds Progress Note        Quality Flow Rounds held on April 16, 2022    Disciplines Attending:  Bedside Nurse, ,  and Nursing Unit Marj Farr was admitted on 4/11/2022  9:54 AM    Anticipated Discharge Date:       Disposition:    Jadon Score:  Jadon Scale Score: 21    Readmission Risk              Risk of Unplanned Readmission:  39           Discussed patient goal for the day, patient clinical progression, and barriers to discharge.   The following Goal(s) of the Day/Commitment(s) have been identified:  Diagnostics - Report Results      Aaron Perea RN  April 16, 2022

## 2022-04-17 ENCOUNTER — APPOINTMENT (OUTPATIENT)
Dept: GENERAL RADIOLOGY | Age: 67
DRG: 291 | End: 2022-04-17
Payer: MEDICARE

## 2022-04-17 LAB
ALBUMIN SERPL-MCNC: 3.7 G/DL (ref 3.5–5.2)
ALP BLD-CCNC: 55 U/L (ref 35–104)
ALT SERPL-CCNC: 20 U/L (ref 0–32)
ANION GAP SERPL CALCULATED.3IONS-SCNC: 13 MMOL/L (ref 7–16)
ANION GAP SERPL CALCULATED.3IONS-SCNC: 22 MMOL/L (ref 7–16)
AST SERPL-CCNC: 11 U/L (ref 0–31)
BASOPHILS ABSOLUTE: 0.01 E9/L (ref 0–0.2)
BASOPHILS RELATIVE PERCENT: 0.1 % (ref 0–2)
BILIRUB SERPL-MCNC: 0.6 MG/DL (ref 0–1.2)
BUN BLDV-MCNC: 71 MG/DL (ref 6–23)
BUN BLDV-MCNC: 74 MG/DL (ref 6–23)
CALCIUM SERPL-MCNC: 9.2 MG/DL (ref 8.6–10.2)
CALCIUM SERPL-MCNC: 9.3 MG/DL (ref 8.6–10.2)
CHLORIDE BLD-SCNC: 86 MMOL/L (ref 98–107)
CHLORIDE BLD-SCNC: 90 MMOL/L (ref 98–107)
CHLORIDE URINE RANDOM: 58 MMOL/L
CO2: 27 MMOL/L (ref 22–29)
CO2: 28 MMOL/L (ref 22–29)
CREAT SERPL-MCNC: 2.2 MG/DL (ref 0.5–1)
CREAT SERPL-MCNC: 2.2 MG/DL (ref 0.5–1)
CREATININE URINE: 46 MG/DL (ref 29–226)
EOSINOPHILS ABSOLUTE: 0.01 E9/L (ref 0.05–0.5)
EOSINOPHILS RELATIVE PERCENT: 0.1 % (ref 0–6)
GFR AFRICAN AMERICAN: 27
GFR AFRICAN AMERICAN: 27
GFR NON-AFRICAN AMERICAN: 27 ML/MIN/1.73
GFR NON-AFRICAN AMERICAN: 27 ML/MIN/1.73
GLUCOSE BLD-MCNC: 135 MG/DL (ref 74–99)
GLUCOSE BLD-MCNC: 231 MG/DL (ref 74–99)
HCT VFR BLD CALC: 40.7 % (ref 34–48)
HEMOGLOBIN: 13.2 G/DL (ref 11.5–15.5)
IMMATURE GRANULOCYTES #: 0.06 E9/L
IMMATURE GRANULOCYTES %: 0.5 % (ref 0–5)
LACTIC ACID: 2 MMOL/L (ref 0.5–2.2)
LYMPHOCYTES ABSOLUTE: 0.88 E9/L (ref 1.5–4)
LYMPHOCYTES RELATIVE PERCENT: 7.5 % (ref 20–42)
MAGNESIUM: 1.7 MG/DL (ref 1.6–2.6)
MCH RBC QN AUTO: 31.9 PG (ref 26–35)
MCHC RBC AUTO-ENTMCNC: 32.4 % (ref 32–34.5)
MCV RBC AUTO: 98.3 FL (ref 80–99.9)
METER GLUCOSE: 141 MG/DL (ref 74–99)
METER GLUCOSE: 241 MG/DL (ref 74–99)
METER GLUCOSE: 308 MG/DL (ref 74–99)
MONOCYTES ABSOLUTE: 0.64 E9/L (ref 0.1–0.95)
MONOCYTES RELATIVE PERCENT: 5.5 % (ref 2–12)
NEUTROPHILS ABSOLUTE: 10.14 E9/L (ref 1.8–7.3)
NEUTROPHILS RELATIVE PERCENT: 86.3 % (ref 43–80)
PDW BLD-RTO: 11.4 FL (ref 11.5–15)
PHOSPHORUS: 5.4 MG/DL (ref 2.5–4.5)
PLATELET # BLD: 265 E9/L (ref 130–450)
PMV BLD AUTO: 10.3 FL (ref 7–12)
POTASSIUM SERPL-SCNC: 4.5 MMOL/L (ref 3.5–5)
POTASSIUM SERPL-SCNC: 4.7 MMOL/L (ref 3.5–5)
POTASSIUM, UR: 15.9 MMOL/L
RBC # BLD: 4.14 E12/L (ref 3.5–5.5)
SODIUM BLD-SCNC: 130 MMOL/L (ref 132–146)
SODIUM BLD-SCNC: 136 MMOL/L (ref 132–146)
SODIUM URINE: 70 MMOL/L
TOTAL PROTEIN: 6.4 G/DL (ref 6.4–8.3)
WBC # BLD: 11.7 E9/L (ref 4.5–11.5)

## 2022-04-17 PROCEDURE — 94640 AIRWAY INHALATION TREATMENT: CPT

## 2022-04-17 PROCEDURE — 83735 ASSAY OF MAGNESIUM: CPT

## 2022-04-17 PROCEDURE — 71045 X-RAY EXAM CHEST 1 VIEW: CPT

## 2022-04-17 PROCEDURE — 94660 CPAP INITIATION&MGMT: CPT

## 2022-04-17 PROCEDURE — 85025 COMPLETE CBC W/AUTO DIFF WBC: CPT

## 2022-04-17 PROCEDURE — 6370000000 HC RX 637 (ALT 250 FOR IP): Performed by: INTERNAL MEDICINE

## 2022-04-17 PROCEDURE — 82570 ASSAY OF URINE CREATININE: CPT

## 2022-04-17 PROCEDURE — S5553 INSULIN LONG ACTING 5 U: HCPCS | Performed by: INTERNAL MEDICINE

## 2022-04-17 PROCEDURE — 83605 ASSAY OF LACTIC ACID: CPT

## 2022-04-17 PROCEDURE — 84300 ASSAY OF URINE SODIUM: CPT

## 2022-04-17 PROCEDURE — 2140000000 HC CCU INTERMEDIATE R&B

## 2022-04-17 PROCEDURE — 80048 BASIC METABOLIC PNL TOTAL CA: CPT

## 2022-04-17 PROCEDURE — 99233 SBSQ HOSP IP/OBS HIGH 50: CPT | Performed by: INTERNAL MEDICINE

## 2022-04-17 PROCEDURE — 6370000000 HC RX 637 (ALT 250 FOR IP): Performed by: PHYSICIAN ASSISTANT

## 2022-04-17 PROCEDURE — 80053 COMPREHEN METABOLIC PANEL: CPT

## 2022-04-17 PROCEDURE — 84100 ASSAY OF PHOSPHORUS: CPT

## 2022-04-17 PROCEDURE — 2580000003 HC RX 258: Performed by: PHYSICIAN ASSISTANT

## 2022-04-17 PROCEDURE — 82962 GLUCOSE BLOOD TEST: CPT

## 2022-04-17 PROCEDURE — 36415 COLL VENOUS BLD VENIPUNCTURE: CPT

## 2022-04-17 PROCEDURE — 82436 ASSAY OF URINE CHLORIDE: CPT

## 2022-04-17 PROCEDURE — 84133 ASSAY OF URINE POTASSIUM: CPT

## 2022-04-17 PROCEDURE — 6360000002 HC RX W HCPCS: Performed by: PHYSICIAN ASSISTANT

## 2022-04-17 RX ADMIN — GABAPENTIN 100 MG: 100 CAPSULE ORAL at 10:38

## 2022-04-17 RX ADMIN — INSULIN LISPRO 6 UNITS: 100 INJECTION, SOLUTION INTRAVENOUS; SUBCUTANEOUS at 12:52

## 2022-04-17 RX ADMIN — INSULIN LISPRO 2 UNITS: 100 INJECTION, SOLUTION INTRAVENOUS; SUBCUTANEOUS at 12:54

## 2022-04-17 RX ADMIN — DOXYCYCLINE HYCLATE 100 MG: 100 CAPSULE ORAL at 10:37

## 2022-04-17 RX ADMIN — ISOSORBIDE MONONITRATE 60 MG: 60 TABLET, EXTENDED RELEASE ORAL at 10:38

## 2022-04-17 RX ADMIN — FLUTICASONE PROPIONATE 2 SPRAY: 50 SPRAY, METERED NASAL at 10:31

## 2022-04-17 RX ADMIN — OXYBUTYNIN CHLORIDE 5 MG: 5 TABLET ORAL at 10:37

## 2022-04-17 RX ADMIN — IPRATROPIUM BROMIDE AND ALBUTEROL SULFATE 3 ML: .5; 2.5 SOLUTION RESPIRATORY (INHALATION) at 15:39

## 2022-04-17 RX ADMIN — INSULIN LISPRO 6 UNITS: 100 INJECTION, SOLUTION INTRAVENOUS; SUBCUTANEOUS at 10:43

## 2022-04-17 RX ADMIN — INSULIN LISPRO 4 UNITS: 100 INJECTION, SOLUTION INTRAVENOUS; SUBCUTANEOUS at 16:51

## 2022-04-17 RX ADMIN — HYDRALAZINE HYDROCHLORIDE 50 MG: 25 TABLET, FILM COATED ORAL at 10:39

## 2022-04-17 RX ADMIN — INSULIN GLARGINE-YFGN 25 UNITS: 100 INJECTION, SOLUTION SUBCUTANEOUS at 22:00

## 2022-04-17 RX ADMIN — PREDNISONE 20 MG: 20 TABLET ORAL at 10:38

## 2022-04-17 RX ADMIN — IPRATROPIUM BROMIDE AND ALBUTEROL SULFATE 3 ML: .5; 2.5 SOLUTION RESPIRATORY (INHALATION) at 13:21

## 2022-04-17 RX ADMIN — CARVEDILOL 25 MG: 25 TABLET, FILM COATED ORAL at 16:50

## 2022-04-17 RX ADMIN — HYDRALAZINE HYDROCHLORIDE 50 MG: 25 TABLET, FILM COATED ORAL at 21:48

## 2022-04-17 RX ADMIN — CETIRIZINE HYDROCHLORIDE 10 MG: 10 TABLET, FILM COATED ORAL at 10:40

## 2022-04-17 RX ADMIN — HYDROCODONE BITARTRATE AND ACETAMINOPHEN 1 TABLET: 5; 325 TABLET ORAL at 12:57

## 2022-04-17 RX ADMIN — IPRATROPIUM BROMIDE AND ALBUTEROL SULFATE 3 ML: .5; 2.5 SOLUTION RESPIRATORY (INHALATION) at 18:51

## 2022-04-17 RX ADMIN — GABAPENTIN 100 MG: 100 CAPSULE ORAL at 21:48

## 2022-04-17 RX ADMIN — SODIUM CHLORIDE, PRESERVATIVE FREE 10 ML: 5 INJECTION INTRAVENOUS at 10:41

## 2022-04-17 RX ADMIN — PANTOPRAZOLE SODIUM 40 MG: 40 TABLET, DELAYED RELEASE ORAL at 10:40

## 2022-04-17 RX ADMIN — DOXYCYCLINE HYCLATE 100 MG: 100 CAPSULE ORAL at 21:49

## 2022-04-17 RX ADMIN — FAMOTIDINE 20 MG: 20 TABLET ORAL at 16:50

## 2022-04-17 RX ADMIN — ASPIRIN 81 MG 81 MG: 81 TABLET ORAL at 10:40

## 2022-04-17 RX ADMIN — INSULIN LISPRO 4 UNITS: 100 INJECTION, SOLUTION INTRAVENOUS; SUBCUTANEOUS at 21:59

## 2022-04-17 RX ADMIN — DULOXETINE HYDROCHLORIDE 60 MG: 60 CAPSULE, DELAYED RELEASE ORAL at 10:39

## 2022-04-17 RX ADMIN — CARVEDILOL 25 MG: 25 TABLET, FILM COATED ORAL at 10:38

## 2022-04-17 RX ADMIN — INSULIN LISPRO 6 UNITS: 100 INJECTION, SOLUTION INTRAVENOUS; SUBCUTANEOUS at 16:56

## 2022-04-17 RX ADMIN — MONTELUKAST SODIUM 10 MG: 10 TABLET ORAL at 21:48

## 2022-04-17 RX ADMIN — BUMETANIDE 2 MG: 1 TABLET ORAL at 10:37

## 2022-04-17 RX ADMIN — ENOXAPARIN SODIUM 40 MG: 100 INJECTION SUBCUTANEOUS at 10:41

## 2022-04-17 RX ADMIN — IPRATROPIUM BROMIDE AND ALBUTEROL SULFATE 3 ML: .5; 2.5 SOLUTION RESPIRATORY (INHALATION) at 09:34

## 2022-04-17 RX ADMIN — ATORVASTATIN CALCIUM 10 MG: 10 TABLET, FILM COATED ORAL at 21:48

## 2022-04-17 RX ADMIN — CYCLOBENZAPRINE 5 MG: 10 TABLET, FILM COATED ORAL at 21:48

## 2022-04-17 RX ADMIN — SODIUM CHLORIDE, PRESERVATIVE FREE 10 ML: 5 INJECTION INTRAVENOUS at 22:00

## 2022-04-17 RX ADMIN — DULOXETINE HYDROCHLORIDE 60 MG: 60 CAPSULE, DELAYED RELEASE ORAL at 21:48

## 2022-04-17 RX ADMIN — TRAZODONE HYDROCHLORIDE 100 MG: 50 TABLET ORAL at 21:48

## 2022-04-17 RX ADMIN — OXYBUTYNIN CHLORIDE 5 MG: 5 TABLET ORAL at 21:48

## 2022-04-17 ASSESSMENT — PAIN SCALES - GENERAL
PAINLEVEL_OUTOF10: 0
PAINLEVEL_OUTOF10: 7
PAINLEVEL_OUTOF10: 10
PAINLEVEL_OUTOF10: 0

## 2022-04-17 ASSESSMENT — PAIN DESCRIPTION - PROGRESSION
CLINICAL_PROGRESSION: GRADUALLY IMPROVING
CLINICAL_PROGRESSION: NOT CHANGED

## 2022-04-17 ASSESSMENT — PAIN DESCRIPTION - LOCATION: LOCATION: BACK

## 2022-04-17 ASSESSMENT — PAIN DESCRIPTION - ORIENTATION: ORIENTATION: RIGHT

## 2022-04-17 ASSESSMENT — PAIN DESCRIPTION - DESCRIPTORS: DESCRIPTORS: ACHING;BURNING;CONSTANT

## 2022-04-17 ASSESSMENT — PAIN DESCRIPTION - PAIN TYPE: TYPE: CHRONIC PAIN

## 2022-04-17 ASSESSMENT — PAIN DESCRIPTION - ONSET: ONSET: GRADUAL

## 2022-04-17 ASSESSMENT — PAIN DESCRIPTION - FREQUENCY: FREQUENCY: CONTINUOUS

## 2022-04-17 NOTE — PROGRESS NOTES
Chief Complaint:  Chief Complaint   Patient presents with    Respiratory Distress     increased SOB the last 4 days. was 90% on trilogy oxygen concentrator at home. EMS placed on CPAP, arrived at 95%, hx of COPD      Acute on chronic diastolic (congestive) heart failure (HCC)     Subjective:    Breathing a lot better and feeling better    C/O right chest pain when she takes deep breath    C/O b/l leg spasm    Objective:    /62   Pulse 77   Temp 97.2 °F (36.2 °C) (Tympanic)   Resp 16   Ht 5' 2\" (1.575 m)   Wt 209 lb 12.8 oz (95.2 kg)   LMP 01/01/1990   SpO2 97%   BMI 38.37 kg/m²     Current medications that patient is taking have been reviewed. General appearance: NAD, conversant, obese, very chronically ill appearing but comfortable/nontoxic appearing  HEENT: AT/NC, MMM  Neck: FROM, supple  Lungs: Much improved breath sounds. Clearer with minimal wheezing.   WOB normal.  CV: RRR, no MRGs  Abdomen: Soft, non-tender; no masses or HSM, +BS  Extremities: No peripheral edema or digital cyanosis  Skin: no rash, lesions or ulcers  Psych: Calm and cooperative  Neuro: Alert and interactive, face symmetric, moving all extremities, speech fluent    Labs:  CBC with Differential:    Lab Results   Component Value Date    WBC 11.7 04/17/2022    RBC 4.14 04/17/2022    HGB 13.2 04/17/2022    HCT 40.7 04/17/2022     04/17/2022    MCV 98.3 04/17/2022    MCH 31.9 04/17/2022    MCHC 32.4 04/17/2022    RDW 11.4 04/17/2022    NRBC 0.0 04/22/2021    SEGSPCT 84 03/03/2014    LYMPHOPCT 7.5 04/17/2022    MONOPCT 5.5 04/17/2022    BASOPCT 0.1 04/17/2022    MONOSABS 0.64 04/17/2022    LYMPHSABS 0.88 04/17/2022    EOSABS 0.01 04/17/2022    BASOSABS 0.01 04/17/2022     CMP:    Lab Results   Component Value Date     04/17/2022    K 4.5 04/17/2022    K 4.9 04/11/2022    CL 86 04/17/2022    CO2 28 04/17/2022    BUN 74 04/17/2022    CREATININE 2.2 04/17/2022    GFRAA 27 04/17/2022    LABGLOM 27 04/17/2022    GLUCOSE 135 04/17/2022    GLUCOSE 181 12/01/2011    PROT 6.4 04/17/2022    LABALBU 3.7 04/17/2022    LABALBU 5.2 12/01/2011    CALCIUM 9.2 04/17/2022    BILITOT 0.6 04/17/2022    ALKPHOS 55 04/17/2022    AST 11 04/17/2022    ALT 20 04/17/2022            Assessment/Plan:  Principal Problem:    Acute on chronic diastolic (congestive) heart failure (HCC)  Active Problems:    Chronic respiratory failure with hypoxia and hypercapnia ([facility])    DAYAN and COPD overlap syndrome ([facility])    DM2 (diabetes mellitus, type 2) ([facility])    Severe obesity (BMI 35.0-35.9 with comorbidity) ([facility] [address]    Encounter for tobacco use cessation counseling    Prolonged Q-T interval on ECG    COPD exacerbation ([facility])    Respiratory distress    CKD (chronic kidney disease) stage 3, GFR 30-59 ml/min ([facility])    Hypertensive emergency    Acute kidney injury superimposed on CKD ([facility] [address]  Resolved Problems:    * No resolved hospital problems. *       Slightly worse ALEC, hold further bumex    Duonebs, prednisone for COPD exacerbation. Still smoking. Lungs sounding a lot better today. She's looking better    Suspect this chest discomfort is pleurisy. Low suspicion for PE, age adjusted d-dimer is negative and more importantly my clinical suspicion is low.     Glucose a lot better    Continue to hold spironolactone, entresto    Stress test negative    DVT prophylaxis: Lovenox  Requires continued inpatient level of care     Anthony Astorga MD    1:19 PM  4/17/2022

## 2022-04-17 NOTE — PROGRESS NOTES
//      Inpatient Cardiology Consultation follow-up visit    Reason for Consult:  CHF    Consulting Physician: Dr. Bonny Singleton    Requesting Physician:  TONY Cueva    Date of Consultation: 4/17/2022    Subjective: Stress test is abnormal.  We will plan for invasive cholangiogram once kidney function is stable. Past Medical History:  (Please note the following information was obtained from consult note on 7/16/2021 when additional information added). 1. Morbid obesity  2. Hypertension  3. Hyperlipidemia. Currently not on a statin but has been intolerant of Lipitor  4. Current tobacco abuse  5. Diabetes which is insulin requiring , diabetic neuropathy   6. Obstructive sleep apnea, states compliance with Cpap  7. Ischemic cardiomyopathy with a CABG in 2011 with a LIMA to the LAD and a saphenous vein graft to the marginal last cardiac catheterization was in April 2015 and the LIMA was unable to be visualized and the saphenous vein graft to the obtuse marginal was patent.   8. 2-D echocardiogram January 4, 2017, EF 35-45% and diffuse hypokinesis of the left ventricle and left atrium is dilatedAortic valve appears mildly sclerotic and there is mild mitral regurgitation  9. NSTEMI: 6/6/2018 (troponin 0.35, 0.32, 0.48) no rise in CK or CK-MB.  Creatinine 1.0.  Per cardiology, probable recent NSTEMI, type II low risk noninvasive 6/5/2018.  Patient was diuresed approximately 10 L --> And discharged home on loop diuretic. 10. Lexiscan MPS: 6/5/2018: Pharmacologically induced perfusion defect involving the lateral wall, without evidence of reversibility, diffusely hypokinetic left ventricular wall motion with a focal area of akinesis involving the mid lateral wall, LVEF 42%. 11. TTE: 6/5/2018: LVEF 65% by biplane, stage II diastolic dysfunction, moderate LVH, no wall motion abnormality, normal RV function, no pericardial effusion.   12. TTE 06/11/2021 (Dr. Prince Khan): Left ventricle size is normal. Ejection fraction is visually estimated at 55-60%. No regional wall motion abnormalities seen. Severe concentric left ventricular hypertrophy. Indeterminate diastolic function. Normal right ventricular size. Right ventricle global systolic function is mildly reduced . TAPSE 15 mm.  No hemodynamically significant aortic stenosis is present. Physiologic and/or trace tricuspid regurgitation. RVSP is 26 mmHg. Normal estimated PA systolic pressure. No evidence for hemodynamically significant pericardial effusion. 13. Chronic RBBB    14. IV dye, Lipitor, Lasix, Norvasc allergies: Unknown reaction per patient  15. C. diff 2015  16. Asthma status post bronchial brush biopsy in January 2013   17. Osteoarthritis. And left knee replacement  18. Hx Liver biopsy. Hepatic encephalopathy  19. S/p Bilateral breast reduction   20. Diverticulosis. 21. Hyperplastic colon polyps and fatty liver  22. Hx Ventral hernia  23. Glaucoma  24. Hx Anemia and blood transfusion  25. Hx Pneumonia  26. Depression  27. S/p Cholecystectomy   28. Patient follows with neurology at [facility] and was diagnosed with chronic pain syndrome and is recommending chronic pain management. 29. History of severe peripheral vascular disease with history of left popliteal and tibial trunk atherectomy on 11/13/2018 was initiated on aspirin and Plavix. 27. COVID-19 infection (April 2021)  31. Hospital admission 7/15/2021-7/21/2021: Admitted for hypoxia /patient was diagnosed with Covid-19 infection 4/2021 and now requiring oxygen. Treated for acute hypoxic respiratory failure secondary to acute COPD exacerbation and acute on chronic HFpEF  32. 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 Medications Prior to admit:  Prior to Admission medications    Medication Sig Start Date End Date Taking?  Authorizing Provider   acetaminophen (TYLENOL) 325 MG tablet Take 650 mg by mouth every 6 hours as needed for Pain   Yes Historical Provider, MD aspirin 81 MG chewable tablet Take 81 mg by mouth daily   Yes Historical Provider, MD   Arformoterol Tartrate (BROVANA) 15 MCG/2ML NEBU Take 1 ampule by nebulization 2 times daily as needed   Yes Historical Provider, MD   azelastine (ASTELIN) 0.1 % nasal spray 1 spray by Nasal route 2 times daily as needed for Rhinitis   Yes Historical Provider, MD   cetirizine (ZYRTEC) 10 MG tablet Take 10 mg by mouth daily   Yes Historical Provider, MD   DULoxetine (CYMBALTA) 60 MG extended release capsule Take 60 mg by mouth 2 times daily   Yes Historical Provider, MD   sacubitril-valsartan (ENTRESTO) 49-51 MG per tablet Take 1 tablet by mouth 2 times daily   Yes Historical Provider, MD   famotidine (PEPCID) 40 MG tablet Take 40 mg by mouth Daily with supper   Yes Historical Provider, MD   fluticasone (FLONASE) 50 MCG/ACT nasal spray 2 sprays by Nasal route daily   Yes Historical Provider, MD   gabapentin (NEURONTIN) 100 MG capsule Take 100 mg by mouth 2 times daily.    Yes Historical Provider, MD   Insulin Degludec (TRESIBA FLEXTOUCH) 200 UNIT/ML SOPN Inject 50 Units into the skin at bedtime   Yes Historical Provider, MD   insulin lispro, 1 Unit Dial, (HUMALOG KWIKPEN) 100 UNIT/ML SOPN Inject 0-6 Units into the skin 3 times daily (before meals) *Per Sliding Scale*   Yes Historical Provider, MD   ipratropium-albuterol (DUONEB) 0.5-2.5 (3) MG/3ML SOLN nebulizer solution Take 1 vial by nebulization every 4 hours as needed for Shortness of Breath   Yes Historical Provider, MD   isosorbide mononitrate (IMDUR) 60 MG extended release tablet Take 60 mg by mouth daily   Yes Historical Provider, MD   montelukast (SINGULAIR) 10 MG tablet Take 10 mg by mouth nightly   Yes Historical Provider, MD   mirabegron (MYRBETRIQ) 50 MG TB24 Take 50 mg by mouth at bedtime   Yes Historical Provider, MD   simvastatin (ZOCOR) 20 MG tablet Take 20 mg by mouth nightly   Yes Historical Provider, MD   tiotropium (SPIRIVA RESPIMAT) 1.25 MCG/ACT AERS inhaler Inhale 2 puffs into the lungs daily   Yes Historical Provider, MD   traZODone (DESYREL) 100 MG tablet Take 100 mg by mouth nightly   Yes Historical Provider, MD   vitamin D (CHOLECALCIFEROL) 25 MCG (1000 UT) TABS tablet Take 1,000 Units by mouth daily   Yes Historical Provider, MD   bumetanide (BUMEX) 1 MG tablet Take 1 tablet by mouth daily 10/25/21   Adolfo Atkins MD   spironolactone (ALDACTONE) 25 MG tablet Take 1 tablet by mouth daily 9/15/21   Adolfo Atkins MD   carvedilol (COREG) 25 MG tablet Take 1 tablet by mouth 2 times daily (with meals) 9/15/21   Adolfo Atkins MD   hydrALAZINE (APRESOLINE) 50 MG tablet Take 50 mg by mouth 3 times daily    Historical Provider, MD   pantoprazole (PROTONIX) 40 MG tablet Take 40 mg by mouth daily  6/21/21   Historical Provider, MD   metoclopramide (REGLAN) 5 MG tablet Take 5 mg by mouth 3 times daily as needed  5/5/21   Tatum Hayes DO   docusate sodium (COLACE) 100 MG capsule Take 200 mg by mouth at bedtime  3/11/21   Historical Provider, MD       Current Medications:    Current Facility-Administered Medications: cyclobenzaprine (FLEXERIL) tablet 5 mg, 5 mg, Oral, TID PRN  predniSONE (DELTASONE) tablet 20 mg, 20 mg, Oral, Daily  insulin lispro (HUMALOG) injection vial 6 Units, 6 Units, SubCUTAneous, TID WC  hydrocortisone 2.5 % cream, , Topical, BID  [Held by provider] bumetanide (BUMEX) tablet 2 mg, 2 mg, Oral, Daily  oxybutynin (DITROPAN) tablet 5 mg, 5 mg, Oral, BID  guaiFENesin (ROBITUSSIN) 100 MG/5ML oral solution 200 mg, 200 mg, Oral, Q6H PRN  insulin glargine-yfgn (SEMGLEE-YFGN) injection vial 25 Units, 25 Units, SubCUTAneous, Nightly  lidocaine 4 % external patch 1 patch, 1 patch, TransDERmal, Daily  doxycycline hyclate (VIBRAMYCIN) capsule 100 mg, 100 mg, Oral, 2 times per day  HYDROcodone-acetaminophen (NORCO) 5-325 MG per tablet 1 tablet, 1 tablet, Oral, Q6H PRN  nicotine (NICODERM CQ) 14 MG/24HR 1 patch, 1 patch, TransDERmal, Daily  perflutren lipid microspheres (DEFINITY) injection 1.65 mg, 1.5 mL, IntraVENous, ONCE PRN  labetalol (NORMODYNE;TRANDATE) injection 10 mg, 10 mg, IntraVENous, Once  Arformoterol Tartrate (BROVANA) nebulizer solution 15 mcg, 15 mcg, Nebulization, BID PRN  aspirin chewable tablet 81 mg, 81 mg, Oral, Daily  carvedilol (COREG) tablet 25 mg, 25 mg, Oral, BID WC  cetirizine (ZYRTEC) tablet 10 mg, 10 mg, Oral, Daily  DULoxetine (CYMBALTA) extended release capsule 60 mg, 60 mg, Oral, BID  famotidine (PEPCID) tablet 20 mg, 20 mg, Oral, Dinner  fluticasone (FLONASE) 50 MCG/ACT nasal spray 2 spray, 2 spray, Nasal, Daily  gabapentin (NEURONTIN) capsule 100 mg, 100 mg, Oral, BID  hydrALAZINE (APRESOLINE) tablet 50 mg, 50 mg, Oral, TID  ipratropium-albuterol (DUONEB) nebulizer solution 3 mL, 1 vial, Nebulization, Q4H WA  isosorbide mononitrate (IMDUR) extended release tablet 60 mg, 60 mg, Oral, Daily  mirabegron (MYRBETRIQ) extended release tablet 50 mg, 50 mg, Oral, Nightly  montelukast (SINGULAIR) tablet 10 mg, 10 mg, Oral, Nightly  pantoprazole (PROTONIX) tablet 40 mg, 40 mg, Oral, Daily  [Held by provider] sacubitril-valsartan (ENTRESTO) 49-51 MG per tablet 1 tablet, 1 tablet, Oral, BID  atorvastatin (LIPITOR) tablet 10 mg, 10 mg, Oral, Nightly  [Held by provider] spironolactone (ALDACTONE) tablet 25 mg, 25 mg, Oral, Daily  traZODone (DESYREL) tablet 100 mg, 100 mg, Oral, Nightly  sodium chloride flush 0.9 % injection 5-40 mL, 5-40 mL, IntraVENous, 2 times per day  sodium chloride flush 0.9 % injection 5-40 mL, 5-40 mL, IntraVENous, PRN  0.9 % sodium chloride infusion, , IntraVENous, PRN  polyethylene glycol (GLYCOLAX) packet 17 g, 17 g, Oral, Daily PRN  acetaminophen (TYLENOL) tablet 650 mg, 650 mg, Oral, Q6H PRN **OR** acetaminophen (TYLENOL) suppository 650 mg, 650 mg, Rectal, Q6H PRN  enoxaparin (LOVENOX) injection 40 mg, 40 mg, SubCUTAneous, Daily  insulin lispro (HUMALOG) injection vial 0-12 Units, 0-12 Units, SubCUTAneous, TID   insulin lispro (HUMALOG) injection vial 0-6 Units, 0-6 Units, SubCUTAneous, Nightly  glucose (GLUTOSE) 40 % oral gel 15 g, 15 g, Oral, PRN  dextrose 50 % IV solution, 12.5 g, IntraVENous, PRN  glucagon (rDNA) injection 1 mg, 1 mg, IntraMUSCular, PRN  dextrose 5 % solution, 100 mL/hr, IntraVENous, PRN  magnesium sulfate 2000 mg in 50 mL IVPB premix, 2,000 mg, IntraVENous, PRN  potassium chloride (KLOR-CON M) extended release tablet 40 mEq, 40 mEq, Oral, PRN **OR** potassium bicarb-citric acid (EFFER-K) effervescent tablet 40 mEq, 40 mEq, Oral, PRN **OR** potassium chloride 10 mEq/100 mL IVPB (Peripheral Line), 10 mEq, IntraVENous, PRN  hydrALAZINE (APRESOLINE) injection 10 mg, 10 mg, IntraVENous, Q4H PRN    Allergies:  Latex, Bee venom, Dilaudid [hydromorphone hcl], Dye [iodides], Percocet [oxycodone-acetaminophen], Keflex [cephalexin], Lasix [furosemide], Levaquin [levofloxacin in d5w], Lipitor, Lyrica [pregabalin], Morphine, Naproxen, Nefazodone, Norvasc [amlodipine], Oxycodone-acetaminophen, Shellfish-derived products, and Trazodone and nefazodone    Social History:    Current smoker: 0.25 PPD x 40 years   Denies alcohol  Marijuana use: 1-2 times per month. Resides alone but has family near by to assist with getting food and taking her to her appointments. Family History: Noncontributory due to advanced age. REVIEW OF SYSTEMS:     · Constitutional: + fatigue, +fevers +chills. Denies  night sweats  · Eyes: Denies visual changes or drainage  · ENT: Denies headaches or hearing loss. No mouth sores or sore throat. No epistaxis   · Cardiovascular: See HPI. · Respiratory: +SAUNDERS, + cough, +orthopnea. Denies PND. No hemoptysis   · Gastrointestinal: Denies hematemesis or anorexia. No hematochezia or melena    · Genitourinary: Denies urgency, dysuria or hematuria.   · Musculoskeletal: Denies gait disturbance, weakness or joint complaints  · Integumentary: Denies rash, hives or pruritis   · Neurological: Denies dizziness, headaches or seizures. No numbness or tingling  · Psychiatric: Denies anxiety or depression. · Endocrine: Denies temperature intolerance. No recent weight change. .  · Hematologic/Lymphatic: Denies abnormal bruising or bleeding. No swollen lymph nodes    PHYSICAL EXAM:   /70   Pulse 69   Temp 97.4 °F (36.3 °C) (Tympanic)   Resp 16   Ht 5' 2\" (1.575 m)   Wt 209 lb 12.8 oz (95.2 kg)   LMP 1990   SpO2 96%   BMI 38.37 kg/m²   CONST:  Well developed, well nourished middle aged AA female who appears of stated age. Awake, alert and cooperative. No apparent distress. Dyspenic during conversation. HEENT:   Head- Normocephalic, atraumatic   Eyes- Conjunctivae pink, anicteric  Throat- Oral mucosa pink and moist  Neck-  Unable to assess   CHEST: Chest symmetrical and non-tender to palpation. No accessory muscle use or intercostal retractions  RESPIRATORY: Lung sounds - Coarse rhonchi with expiratory wheeze. On Supplemental O2.   CARDIOVASCULAR:     Heart Inspection- shows no noted pulsations  Heart Palpation- no heaves or thrills; PMI is non-displaced   Heart Ausculation- Regular rate and rhythm, no murmur. No s3, s4 or rub   PV: No lower extremity edema. No varicosities. Pedal pulses palpable, no clubbing or cyanosis   ABDOMEN: Soft, obese,  non-tender to light palpation. Bowel sounds present. No palpable masses no organomegaly; no abdominal bruit  MS: Good muscle strength and tone. No atrophy or abnormal movements. : Deferred  SKIN: Warm and dry no statis dermatitis or ulcers   NEURO / PSYCH: Oriented to person, place and time. Speech clear and appropriate. Follows all commands. Flat affect.      DATA:    EC2022 NSR, cRBBB, rate 94 bpm.  Tele strips: SR, HR 60 bpm.   Diagnostic:      Intake/Output Summary (Last 24 hours) at 2022 1732  Last data filed at 2022 1412  Gross per 24 hour   Intake 450 ml   Output 1600 ml   Net -1150 ml       Labs:   CBC:   Recent Labs 04/16/22  0438 04/17/22  0449   WBC 10.6 11.7*   HGB 14.0 13.2   HCT 43.3 40.7    265     BMP:   Recent Labs     04/17/22  0449 04/17/22  1626    130*   K 4.5 4.7   CO2 28 27   BUN 74* 71*   CREATININE 2.2* 2.2*   LABGLOM 27 27   CALCIUM 9.2 9.3     Mag:   Recent Labs     04/16/22  0438 04/17/22  0449   MG 1.7 1.7     Phos:   Recent Labs     04/17/22  0449   PHOS 5.4*     TFT:   Lab Results   Component Value Date    TSH 0.646 02/05/2021    I7VUJRA 7.9 11/07/2017    T4FREE 1.34 12/01/2016      HgA1c:   Lab Results   Component Value Date    LABA1C 6.9 (H) 07/16/2021     No results found for: EAG  proBNP:   No results for input(s): PROBNP in the last 72 hours. CARDIAC ENZYMES:  No results for input(s): CKTOTAL, CKMB, CKMBINDEX, TROPHS in the last 72 hours. FASTING LIPID PANEL:  Lab Results   Component Value Date    CHOL 129 04/12/2022    HDL 32 04/12/2022    LDLCALC 74 04/12/2022    TRIG 114 04/12/2022     LIVER PROFILE:  Recent Labs     04/17/22  0449   AST 11   ALT 20   LABALBU 3.7     CXR: 4/11/2022:  Impression   Bilateral airspace disease likely related to cardiogenic edema.  See above. Transthoracic echocardiogram April 13, 2022,  Summary   Technically difficult examination. Severe concentric left ventricular hypertrophy. Left ventricular diastolic filling is elevated . Ejection fraction is visually estimated at 50 to 55%. Normal right ventricular size and function. Mild mitral regurgitation is present. Mild tricuspid regurgitation. Myocardial perfusion stress test April 16, 2022:    1: There is a medium size, moderate intensity scar in the inferior,   inferior lateral and lateral wall with partial reversibility.    2  The inferior and inferior lateral wall appears mildly hypokinetic   with estimated left ventricular ejection fraction of 40%   3: Low dose CT attenuation correction protocol was utilized for this   study   4: Please see separate report for EKG and hemodynamic infection (4/2021)  18. Hyperkalemia   19.  Bladder incontinent  Management per primary service

## 2022-04-17 NOTE — PROGRESS NOTES
Nephrology Progress Note  4/17/2022 12:49 PM  Subjective:   Admit Date: 4/11/2022  PCP: Kaitylnn Newton DO    nterval History: I was unable to see the patient yesterday she was not in her room when I was rounding with Devin Corado nephrology nurse practitioner. Patient was in the stress test.    Today, I saw the patient in her room she was sitting up on a bedside chair not on oxygen denying pain and denying shortness of breath she told me she has good appetite with no nausea vomiting or diarrhea she was concerned about worsening creatinine    Diet: ADULT DIET; Regular; 3 carb choices (45 gm/meal);  Low Fat/Low Chol/High Fiber/2 gm Na    Data:     Scheduled Meds:   predniSONE  20 mg Oral Daily    insulin lispro  6 Units SubCUTAneous TID WC    hydrocortisone   Topical BID    [Held by provider] bumetanide  2 mg Oral Daily    oxybutynin  5 mg Oral BID    insulin glargine  25 Units SubCUTAneous Nightly    lidocaine  1 patch TransDERmal Daily    doxycycline hyclate  100 mg Oral 2 times per day    nicotine  1 patch TransDERmal Daily    labetalol  10 mg IntraVENous Once    aspirin  81 mg Oral Daily    carvedilol  25 mg Oral BID WC    cetirizine  10 mg Oral Daily    DULoxetine  60 mg Oral BID    famotidine  20 mg Oral Dinner    fluticasone  2 spray Nasal Daily    gabapentin  100 mg Oral BID    hydrALAZINE  50 mg Oral TID    ipratropium-albuterol  1 vial Nebulization Q4H WA    isosorbide mononitrate  60 mg Oral Daily    mirabegron  50 mg Oral Nightly    montelukast  10 mg Oral Nightly    pantoprazole  40 mg Oral Daily    [Held by provider] sacubitril-valsartan  1 tablet Oral BID    atorvastatin  10 mg Oral Nightly    [Held by provider] spironolactone  25 mg Oral Daily    traZODone  100 mg Oral Nightly    sodium chloride flush  5-40 mL IntraVENous 2 times per day    enoxaparin  40 mg SubCUTAneous Daily    insulin lispro  0-12 Units SubCUTAneous TID WC    insulin lispro  0-6 Units SubCUTAneous Nightly     Continuous Infusions:   sodium chloride      dextrose       PRN Meds:cyclobenzaprine, guaiFENesin, HYDROcodone 5 mg - acetaminophen, perflutren lipid microspheres, Arformoterol Tartrate, sodium chloride flush, sodium chloride, polyethylene glycol, acetaminophen **OR** acetaminophen, glucose, dextrose, glucagon (rDNA), dextrose, magnesium sulfate, potassium chloride **OR** potassium alternative oral replacement **OR** potassium chloride, hydrALAZINE  I/O last 3 completed shifts: In: 65 [P.O.:660]  Out: 2300 [Urine:2300]  I/O this shift:  In: 150 [P.O.:150]  Out: 450 [Urine:450]    Intake/Output Summary (Last 24 hours) at 4/17/2022 1249  Last data filed at 4/17/2022 1050  Gross per 24 hour   Intake 330 ml   Output 1850 ml   Net -1520 ml     CBC:   Recent Labs     04/15/22  0530 04/16/22  0438 04/17/22  0449   WBC 9.2 10.6 11.7*   HGB 13.2 14.0 13.2    257 265     BMP:    Recent Labs     04/15/22  0530 04/16/22  0438 04/17/22  0449    133 136   K 4.8 4.3 4.5   CL 95* 91* 86*   CO2 29 32* 28   BUN 50* 58* 74*   CREATININE 1.4* 1.7* 2.2*   GLUCOSE 151* 191* 135*     Hepatic:   Recent Labs     04/17/22  0449   AST 11   ALT 20   BILITOT 0.6   ALKPHOS 55     Protein/ Albumin:    Lab Results   Component Value Date    LABPROT 0.1 02/20/2015    LABALBU 3.7 04/17/2022      ECHO4/14/2022   Technically difficult examination. Severe concentric left ventricular hypertrophy. Left ventricular diastolic filling is elevated . Ejection fraction is visually estimated at 50 to 55%. Normal right ventricular size and function. Mild mitral regurgitation is present. Mild tricuspid regurgitation. Objective:     Vitals: /62   Pulse 77   Temp 97.2 °F (36.2 °C) (Tympanic)   Resp 16   Ht 5' 2\" (1.575 m)   Wt 209 lb 12.8 oz (95.2 kg)   LMP 01/01/1990   SpO2 97%   BMI 38.37 kg/m²   General appearance: Sitting up on a bedside chair.   Awake alert and oriented not in acute distress and not on oxygen  Lungs: Diminished air movement with few fine bilateral basal crepitations  Heart: No S3, No rub  Abdomen: Lax, soft No tenderness  L. Extremities: No edema    Assessment & Plan:     Acute kidney injury on top of stage IIIa chronic kidney disease likely reflecting cardiorenal syndrome and possibly resulting from overdiuresis. Currently, Entresto, spironolactone and Bumex are all on hold. According to intake and output charting, she is 6.9 L negative fluid balance. During this admission her weight went down from 213 ---> 209 pounds her stress test revealed ejection fraction of 40% yet her recent echocardiogram revealed ejection fraction of 50 to 55% she has known history of HFpEF. I will check bladder scan for PVR. Check fractional excretion of sodium check chest x-ray. If no evidence of pulmonary congestion or cyst x-ray, I will start IV fluid very cautiously. Mixed acid-base balance: High anion gap metabolic acidosis likely reflecting acute kidney injury and metabolic alkalosis likely reflecting contraction alkalosis. Check lactic acid and repeat basic metabolic panel    Hyperphosphatemia reflecting acute kidney injury: Recheck in a.m. Add binder if phosphorus exceeds 5.5. Cardiology input noted regarding abnormal stress test and need for outpatient cardiac amyloid               This note was created using voice recognition software.     Electronically signed by Rosa Lyons MD on 4/17/2022 at 12:49 PM

## 2022-04-17 NOTE — PROGRESS NOTES
Date: 4/16/2022    Time: 10:32 PM    Patient Placed On BIPAP/CPAP/ Non-Invasive Ventilation? Yes    If no must comment. Facial area red/color change? No           If YES are Blister/Lesion present? No   If yes must notify nursing staff  BIPAP/CPAP skin barrier?   Yes    Skin barrier type:mepilexlite       Comments:        Latonia Jarvis RCP

## 2022-04-17 NOTE — PROGRESS NOTES
Date: 4/17/2022    Time: 7:00 PM    Patient Placed On BIPAP/CPAP/ Non-Invasive Ventilation? Yes    If no must comment. Facial area red/color change? No           If YES are Blister/Lesion present? No   If yes must notify nursing staff  BIPAP/CPAP skin barrier? Yes    Skin barrier type:mepilexlite       Comments:   Patient asked to be placed on BIPAP for night because she was laying down and will fall asleep.     Jazmyn Ricardo

## 2022-04-18 ENCOUNTER — APPOINTMENT (OUTPATIENT)
Dept: GENERAL RADIOLOGY | Age: 67
DRG: 291 | End: 2022-04-18
Payer: MEDICARE

## 2022-04-18 LAB
ALBUMIN SERPL-MCNC: 3.5 G/DL (ref 3.5–5.2)
ALP BLD-CCNC: 63 U/L (ref 35–104)
ALT SERPL-CCNC: 23 U/L (ref 0–32)
ANION GAP SERPL CALCULATED.3IONS-SCNC: 12 MMOL/L (ref 7–16)
AST SERPL-CCNC: 11 U/L (ref 0–31)
BASOPHILS ABSOLUTE: 0.01 E9/L (ref 0–0.2)
BASOPHILS RELATIVE PERCENT: 0.1 % (ref 0–2)
BILIRUB SERPL-MCNC: 0.5 MG/DL (ref 0–1.2)
BILIRUBIN URINE: NEGATIVE
BLOOD, URINE: NEGATIVE
BUN BLDV-MCNC: 74 MG/DL (ref 6–23)
CALCIUM SERPL-MCNC: 8.8 MG/DL (ref 8.6–10.2)
CHLORIDE BLD-SCNC: 91 MMOL/L (ref 98–107)
CHLORIDE URINE RANDOM: <20 MMOL/L
CLARITY: CLEAR
CO2: 29 MMOL/L (ref 22–29)
COLOR: YELLOW
CREAT SERPL-MCNC: 2.4 MG/DL (ref 0.5–1)
CREATININE URINE: 77 MG/DL (ref 29–226)
EOSINOPHILS ABSOLUTE: 0.03 E9/L (ref 0.05–0.5)
EOSINOPHILS RELATIVE PERCENT: 0.3 % (ref 0–6)
GFR AFRICAN AMERICAN: 24
GFR NON-AFRICAN AMERICAN: 24 ML/MIN/1.73
GLUCOSE BLD-MCNC: 204 MG/DL (ref 74–99)
GLUCOSE URINE: NEGATIVE MG/DL
HCT VFR BLD CALC: 41.3 % (ref 34–48)
HEMOGLOBIN: 13.1 G/DL (ref 11.5–15.5)
IMMATURE GRANULOCYTES #: 0.07 E9/L
IMMATURE GRANULOCYTES %: 0.7 % (ref 0–5)
KETONES, URINE: NEGATIVE MG/DL
LEUKOCYTE ESTERASE, URINE: NEGATIVE
LYMPHOCYTES ABSOLUTE: 0.86 E9/L (ref 1.5–4)
LYMPHOCYTES RELATIVE PERCENT: 8.2 % (ref 20–42)
MAGNESIUM: 1.6 MG/DL (ref 1.6–2.6)
MCH RBC QN AUTO: 31.5 PG (ref 26–35)
MCHC RBC AUTO-ENTMCNC: 31.7 % (ref 32–34.5)
MCV RBC AUTO: 99.3 FL (ref 80–99.9)
METER GLUCOSE: 121 MG/DL (ref 74–99)
METER GLUCOSE: 170 MG/DL (ref 74–99)
METER GLUCOSE: 194 MG/DL (ref 74–99)
METER GLUCOSE: 277 MG/DL (ref 74–99)
MONOCYTES ABSOLUTE: 0.62 E9/L (ref 0.1–0.95)
MONOCYTES RELATIVE PERCENT: 5.9 % (ref 2–12)
NEUTROPHILS ABSOLUTE: 8.94 E9/L (ref 1.8–7.3)
NEUTROPHILS RELATIVE PERCENT: 84.8 % (ref 43–80)
NITRITE, URINE: NEGATIVE
PDW BLD-RTO: 11.4 FL (ref 11.5–15)
PH UA: 6 (ref 5–9)
PHOSPHORUS: 4.5 MG/DL (ref 2.5–4.5)
PLATELET # BLD: 264 E9/L (ref 130–450)
PMV BLD AUTO: 10.6 FL (ref 7–12)
POTASSIUM SERPL-SCNC: 3.8 MMOL/L (ref 3.5–5)
POTASSIUM, UR: 19.9 MMOL/L
PROTEIN UA: NEGATIVE MG/DL
RBC # BLD: 4.16 E12/L (ref 3.5–5.5)
SODIUM BLD-SCNC: 132 MMOL/L (ref 132–146)
SODIUM URINE: 30 MMOL/L
SPECIFIC GRAVITY UA: 1.01 (ref 1–1.03)
TOTAL PROTEIN: 6.2 G/DL (ref 6.4–8.3)
UROBILINOGEN, URINE: 0.2 E.U./DL
WBC # BLD: 10.5 E9/L (ref 4.5–11.5)

## 2022-04-18 PROCEDURE — 6370000000 HC RX 637 (ALT 250 FOR IP): Performed by: INTERNAL MEDICINE

## 2022-04-18 PROCEDURE — 97530 THERAPEUTIC ACTIVITIES: CPT

## 2022-04-18 PROCEDURE — 36415 COLL VENOUS BLD VENIPUNCTURE: CPT

## 2022-04-18 PROCEDURE — 6370000000 HC RX 637 (ALT 250 FOR IP): Performed by: PHYSICIAN ASSISTANT

## 2022-04-18 PROCEDURE — 2140000000 HC CCU INTERMEDIATE R&B

## 2022-04-18 PROCEDURE — 74018 RADEX ABDOMEN 1 VIEW: CPT

## 2022-04-18 PROCEDURE — 97535 SELF CARE MNGMENT TRAINING: CPT

## 2022-04-18 PROCEDURE — 6360000002 HC RX W HCPCS: Performed by: PHYSICIAN ASSISTANT

## 2022-04-18 PROCEDURE — 6370000000 HC RX 637 (ALT 250 FOR IP): Performed by: FAMILY MEDICINE

## 2022-04-18 PROCEDURE — 82436 ASSAY OF URINE CHLORIDE: CPT

## 2022-04-18 PROCEDURE — 82570 ASSAY OF URINE CREATININE: CPT

## 2022-04-18 PROCEDURE — 99233 SBSQ HOSP IP/OBS HIGH 50: CPT | Performed by: INTERNAL MEDICINE

## 2022-04-18 PROCEDURE — 84100 ASSAY OF PHOSPHORUS: CPT

## 2022-04-18 PROCEDURE — 6360000002 HC RX W HCPCS: Performed by: INTERNAL MEDICINE

## 2022-04-18 PROCEDURE — APPSS30 APP SPLIT SHARED TIME 16-30 MINUTES: Performed by: PHYSICIAN ASSISTANT

## 2022-04-18 PROCEDURE — 94640 AIRWAY INHALATION TREATMENT: CPT

## 2022-04-18 PROCEDURE — 2580000003 HC RX 258: Performed by: PHYSICIAN ASSISTANT

## 2022-04-18 PROCEDURE — 84300 ASSAY OF URINE SODIUM: CPT

## 2022-04-18 PROCEDURE — 84133 ASSAY OF URINE POTASSIUM: CPT

## 2022-04-18 PROCEDURE — 94660 CPAP INITIATION&MGMT: CPT

## 2022-04-18 PROCEDURE — 85025 COMPLETE CBC W/AUTO DIFF WBC: CPT

## 2022-04-18 PROCEDURE — 81003 URINALYSIS AUTO W/O SCOPE: CPT

## 2022-04-18 PROCEDURE — 82962 GLUCOSE BLOOD TEST: CPT

## 2022-04-18 PROCEDURE — S5553 INSULIN LONG ACTING 5 U: HCPCS | Performed by: INTERNAL MEDICINE

## 2022-04-18 PROCEDURE — 80053 COMPREHEN METABOLIC PANEL: CPT

## 2022-04-18 PROCEDURE — 83735 ASSAY OF MAGNESIUM: CPT

## 2022-04-18 RX ORDER — ISOSORBIDE MONONITRATE 30 MG/1
90 TABLET, EXTENDED RELEASE ORAL DAILY
Status: DISCONTINUED | OUTPATIENT
Start: 2022-04-19 | End: 2022-04-19 | Stop reason: HOSPADM

## 2022-04-18 RX ORDER — SENNA AND DOCUSATE SODIUM 50; 8.6 MG/1; MG/1
1 TABLET, FILM COATED ORAL 2 TIMES DAILY
Status: DISCONTINUED | OUTPATIENT
Start: 2022-04-18 | End: 2022-04-19 | Stop reason: HOSPADM

## 2022-04-18 RX ORDER — BISACODYL 10 MG
10 SUPPOSITORY, RECTAL RECTAL DAILY PRN
Status: DISCONTINUED | OUTPATIENT
Start: 2022-04-18 | End: 2022-04-19 | Stop reason: HOSPADM

## 2022-04-18 RX ORDER — POLYETHYLENE GLYCOL 3350 17 G/17G
17 POWDER, FOR SOLUTION ORAL DAILY
Status: DISCONTINUED | OUTPATIENT
Start: 2022-04-19 | End: 2022-04-19 | Stop reason: HOSPADM

## 2022-04-18 RX ORDER — POLYETHYLENE GLYCOL 3350 17 G/17G
17 POWDER, FOR SOLUTION ORAL DAILY
Status: DISCONTINUED | OUTPATIENT
Start: 2022-04-18 | End: 2022-04-18 | Stop reason: SDUPTHER

## 2022-04-18 RX ADMIN — FAMOTIDINE 20 MG: 20 TABLET ORAL at 16:27

## 2022-04-18 RX ADMIN — PANTOPRAZOLE SODIUM 40 MG: 40 TABLET, DELAYED RELEASE ORAL at 07:58

## 2022-04-18 RX ADMIN — ACETAMINOPHEN 650 MG: 325 TABLET ORAL at 07:59

## 2022-04-18 RX ADMIN — HYDRALAZINE HYDROCHLORIDE 50 MG: 25 TABLET, FILM COATED ORAL at 14:03

## 2022-04-18 RX ADMIN — FLUTICASONE PROPIONATE 2 SPRAY: 50 SPRAY, METERED NASAL at 07:59

## 2022-04-18 RX ADMIN — ISOSORBIDE MONONITRATE 60 MG: 60 TABLET, EXTENDED RELEASE ORAL at 07:58

## 2022-04-18 RX ADMIN — ATORVASTATIN CALCIUM 10 MG: 10 TABLET, FILM COATED ORAL at 20:36

## 2022-04-18 RX ADMIN — PREDNISONE 20 MG: 20 TABLET ORAL at 07:57

## 2022-04-18 RX ADMIN — HYDRALAZINE HYDROCHLORIDE 50 MG: 25 TABLET, FILM COATED ORAL at 20:37

## 2022-04-18 RX ADMIN — TRIMETHOBENZAMIDE HYDROCHLORIDE 200 MG: 100 INJECTION INTRAMUSCULAR at 09:59

## 2022-04-18 RX ADMIN — INSULIN GLARGINE-YFGN 25 UNITS: 100 INJECTION, SOLUTION SUBCUTANEOUS at 20:54

## 2022-04-18 RX ADMIN — DOCUSATE SODIUM 50 MG AND SENNOSIDES 8.6 MG 1 TABLET: 8.6; 5 TABLET, FILM COATED ORAL at 20:37

## 2022-04-18 RX ADMIN — INSULIN LISPRO 4 UNITS: 100 INJECTION, SOLUTION INTRAVENOUS; SUBCUTANEOUS at 08:00

## 2022-04-18 RX ADMIN — CETIRIZINE HYDROCHLORIDE 10 MG: 10 TABLET, FILM COATED ORAL at 07:58

## 2022-04-18 RX ADMIN — GABAPENTIN 100 MG: 100 CAPSULE ORAL at 20:37

## 2022-04-18 RX ADMIN — DOXYCYCLINE HYCLATE 100 MG: 100 CAPSULE ORAL at 07:58

## 2022-04-18 RX ADMIN — INSULIN LISPRO 6 UNITS: 100 INJECTION, SOLUTION INTRAVENOUS; SUBCUTANEOUS at 16:27

## 2022-04-18 RX ADMIN — OXYBUTYNIN CHLORIDE 5 MG: 5 TABLET ORAL at 07:57

## 2022-04-18 RX ADMIN — INSULIN LISPRO 2 UNITS: 100 INJECTION, SOLUTION INTRAVENOUS; SUBCUTANEOUS at 12:15

## 2022-04-18 RX ADMIN — IPRATROPIUM BROMIDE AND ALBUTEROL SULFATE 3 ML: .5; 2.5 SOLUTION RESPIRATORY (INHALATION) at 09:40

## 2022-04-18 RX ADMIN — GABAPENTIN 100 MG: 100 CAPSULE ORAL at 07:58

## 2022-04-18 RX ADMIN — TRAZODONE HYDROCHLORIDE 100 MG: 50 TABLET ORAL at 20:36

## 2022-04-18 RX ADMIN — INSULIN LISPRO 6 UNITS: 100 INJECTION, SOLUTION INTRAVENOUS; SUBCUTANEOUS at 08:12

## 2022-04-18 RX ADMIN — DULOXETINE HYDROCHLORIDE 60 MG: 60 CAPSULE, DELAYED RELEASE ORAL at 07:58

## 2022-04-18 RX ADMIN — SODIUM CHLORIDE, PRESERVATIVE FREE 10 ML: 5 INJECTION INTRAVENOUS at 20:37

## 2022-04-18 RX ADMIN — ENOXAPARIN SODIUM 40 MG: 100 INJECTION SUBCUTANEOUS at 07:59

## 2022-04-18 RX ADMIN — CARVEDILOL 25 MG: 25 TABLET, FILM COATED ORAL at 07:58

## 2022-04-18 RX ADMIN — POLYETHYLENE GLYCOL 3350 17 G: 17 POWDER, FOR SOLUTION ORAL at 08:16

## 2022-04-18 RX ADMIN — IPRATROPIUM BROMIDE AND ALBUTEROL SULFATE 3 ML: .5; 2.5 SOLUTION RESPIRATORY (INHALATION) at 16:00

## 2022-04-18 RX ADMIN — INSULIN LISPRO 6 UNITS: 100 INJECTION, SOLUTION INTRAVENOUS; SUBCUTANEOUS at 12:19

## 2022-04-18 RX ADMIN — BISACODYL 10 MG: 10 SUPPOSITORY RECTAL at 14:04

## 2022-04-18 RX ADMIN — INSULIN LISPRO 1 UNITS: 100 INJECTION, SOLUTION INTRAVENOUS; SUBCUTANEOUS at 20:54

## 2022-04-18 RX ADMIN — CARVEDILOL 25 MG: 25 TABLET, FILM COATED ORAL at 16:27

## 2022-04-18 RX ADMIN — INSULIN LISPRO 6 UNITS: 100 INJECTION, SOLUTION INTRAVENOUS; SUBCUTANEOUS at 16:33

## 2022-04-18 RX ADMIN — OXYBUTYNIN CHLORIDE 5 MG: 5 TABLET ORAL at 20:36

## 2022-04-18 RX ADMIN — HYDRALAZINE HYDROCHLORIDE 50 MG: 25 TABLET, FILM COATED ORAL at 07:58

## 2022-04-18 RX ADMIN — MONTELUKAST SODIUM 10 MG: 10 TABLET ORAL at 20:36

## 2022-04-18 RX ADMIN — SODIUM CHLORIDE, PRESERVATIVE FREE 10 ML: 5 INJECTION INTRAVENOUS at 08:01

## 2022-04-18 RX ADMIN — CYCLOBENZAPRINE 5 MG: 10 TABLET, FILM COATED ORAL at 20:36

## 2022-04-18 RX ADMIN — ASPIRIN 81 MG 81 MG: 81 TABLET ORAL at 07:58

## 2022-04-18 RX ADMIN — DULOXETINE HYDROCHLORIDE 60 MG: 60 CAPSULE, DELAYED RELEASE ORAL at 20:36

## 2022-04-18 RX ADMIN — DOXYCYCLINE HYCLATE 100 MG: 100 CAPSULE ORAL at 20:36

## 2022-04-18 RX ADMIN — HYDROCODONE BITARTRATE AND ACETAMINOPHEN 1 TABLET: 5; 325 TABLET ORAL at 02:50

## 2022-04-18 RX ADMIN — IPRATROPIUM BROMIDE AND ALBUTEROL SULFATE 3 ML: .5; 2.5 SOLUTION RESPIRATORY (INHALATION) at 21:35

## 2022-04-18 ASSESSMENT — PAIN DESCRIPTION - ONSET
ONSET: ON-GOING

## 2022-04-18 ASSESSMENT — PAIN DESCRIPTION - PAIN TYPE
TYPE: CHRONIC PAIN
TYPE: ACUTE PAIN

## 2022-04-18 ASSESSMENT — PAIN SCALES - GENERAL
PAINLEVEL_OUTOF10: 9
PAINLEVEL_OUTOF10: 7
PAINLEVEL_OUTOF10: 9
PAINLEVEL_OUTOF10: 9
PAINLEVEL_OUTOF10: 7

## 2022-04-18 ASSESSMENT — PAIN DESCRIPTION - ORIENTATION: ORIENTATION: LEFT;LOWER

## 2022-04-18 ASSESSMENT — PAIN DESCRIPTION - DESCRIPTORS
DESCRIPTORS: SHARP
DESCRIPTORS: ACHING

## 2022-04-18 ASSESSMENT — PAIN - FUNCTIONAL ASSESSMENT: PAIN_FUNCTIONAL_ASSESSMENT: ACTIVITIES ARE NOT PREVENTED

## 2022-04-18 ASSESSMENT — PAIN DESCRIPTION - LOCATION
LOCATION: BACK
LOCATION: ABDOMEN

## 2022-04-18 ASSESSMENT — PAIN DESCRIPTION - PROGRESSION
CLINICAL_PROGRESSION: GRADUALLY IMPROVING
CLINICAL_PROGRESSION: NOT CHANGED

## 2022-04-18 NOTE — PROGRESS NOTES
Comprehensive Nutrition Assessment    Type and Reason for Visit:  Initial (LOS)    Nutrition Recommendations/Plan: No nutritional supplementation recommended at this time. Nutrition Assessment:  Patients po intake seems to be adequate, averaging % of meals served since admission ; adm w/ SOB ; ALEC on CKD ; hx of DM and COPD ; hx of CAD and CHF ; s/p laryngoscopy 4/4 ; no ONS recommended at this time    Malnutrition Assessment:  Malnutrition Status:  No malnutrition    Context:  Acute Illness     Findings of the 6 clinical characteristics of malnutrition:  Energy Intake:  No significant decrease in energy intake  Weight Loss:  Unable to assess (d/t possible fluid loss ; hx of CHF)     Body Fat Loss:  No significant body fat loss     Muscle Mass Loss:  No significant muscle mass loss    Fluid Accumulation:  No significant fluid accumulation     Strength:  Not Performed    Estimated Daily Nutrient Needs:  Energy (kcal):  5272-7131 (REE 1443 x 1.1 SF); Weight Used for Energy Requirements:  Current     Protein (g):  70-90 (1.5-1.8g/kg IBW); Weight Used for Protein Requirements:  Ideal        Fluid (ml/day):  2031-6612; Method Used for Fluid Requirements:  1 ml/kcal      Nutrition Related Findings:  -I&Os (-8.2 L), no edema, hypoactive BS, rounded abd, constipation, nausea, A&O x 4, bipap, obesity      Wounds:  None       Current Nutrition Therapies:    ADULT DIET; Regular; 3 carb choices (45 gm/meal);  Low Fat/Low Chol/High Fiber/2 gm Na    Anthropometric Measures:  · Height: 5' 2\" (157.5 cm)  · Current Body Weight: 210 lb (95.3 kg) (4/18, actual)   · Admission Body Weight: 211 lb (95.7 kg) (4/11, bedscale)    · Usual Body Weight: 220 lb (99.8 kg) (5/4/21, actual)     · Ideal Body Weight: 110 lbs; % Ideal Body Weight 190.9 %   · BMI: 38.4  · BMI Categories: Obese Class 2 (BMI 35.0 -39.9)       Nutrition Diagnosis:   No nutrition diagnosis at this time     Nutrition Interventions:   Food and/or Nutrient Delivery:  Continue Current Diet  Nutrition Education/Counseling:  Education completed   Coordination of Nutrition Care:  Continue to monitor while inpatient    Goals:  Patient will consume ~75% of meals served       Nutrition Monitoring and Evaluation:   Behavioral-Environmental Outcomes:  None Identified   Food/Nutrient Intake Outcomes:  Food and Nutrient Intake  Physical Signs/Symptoms Outcomes:  Biochemical Data,Chewing or Swallowing,GI Status,Hemodynamic Status,Fluid Status or Edema,Meal Time Behavior,Nausea or Vomiting,Constipation,Nutrition Focused Physical Findings,Skin,Weight     Discharge Planning:     Too soon to determine     Electronically signed by Debbie Morales RD, LD on 4/18/22 at 10:01 AM EDT    Contact: [phone]

## 2022-04-18 NOTE — PROGRESS NOTES
Dr. Nj Moore MD,    Your patient is on a medication that requires a renal and/or weight dose adjustment. Renal/Body Weight Function Assessment:    Date Body Weight IBW  Adjusted BW SCr  CrCl Dialysis status   4/18/2022 210 lb (95.3 kg) Ideal body weight: 50.1 kg (110 lb 7.2 oz)  Adjusted ideal body weight: 68.2 kg (150 lb 4.3 oz) Serum creatinine: 2.4 mg/dL (H) 04/18/22 0412  Estimated creatinine clearance: 24 mL/min (A) N/A       Pharmacy has dose-adjusted the following medication(s):    Date Previous Order Adjusted Order   4/18/2022 Enoxaparin 40 mg daily Enoxaparin 30 mg daily       These changes were made per protocol according to the [facility] Renal Dosing Policy/ [facility] Pharmacist Anticoagulant Review. *Please note this dose may need readjusted if your patient's condition changes. Please contact pharmacy with any questions regarding these changes.     Janes Michel, [facility]  4/18/2022  8:50 AM

## 2022-04-18 NOTE — PROGRESS NOTES
Chief Complaint:  Chief Complaint   Patient presents with    Respiratory Distress     increased SOB the last 4 days. was 90% on trilogy oxygen concentrator at home. EMS placed on CPAP, arrived at 95%, hx of COPD      Acute on chronic diastolic (congestive) heart failure (HCC)     Subjective:    Patient is awake and alert sitting up in chair at bedside  No acute distress  Does complain of left lower quadrant pain and dysuria  Obtain KUB and urinalysis. Objective:    /60   Pulse 70   Temp 97.7 °F (36.5 °C)   Resp 17   Ht 5' 2\" (1.575 m)   Wt 210 lb (95.3 kg)   LMP 01/01/1990   SpO2 96%   BMI 38.41 kg/m²     Current medications that patient is taking have been reviewed. General appearance: NAD, conversant, obese, very chronically ill appearing but comfortable/nontoxic appearing  HEENT: AT/NC, MMM  Neck: FROM, supple  Lungs: Much improved breath sounds. Clearer with minimal wheezing.   WOB normal.  CV: RRR, no MRGs  Abdomen: Soft, non-tender; no masses or HSM, +BS  Extremities: No peripheral edema or digital cyanosis  Skin: no rash, lesions or ulcers  Psych: Calm and cooperative  Neuro: Alert and interactive, face symmetric, moving all extremities, speech fluent    Labs:  CBC with Differential:    Lab Results   Component Value Date    WBC 10.5 04/18/2022    RBC 4.16 04/18/2022    HGB 13.1 04/18/2022    HCT 41.3 04/18/2022     04/18/2022    MCV 99.3 04/18/2022    MCH 31.5 04/18/2022    MCHC 31.7 04/18/2022    RDW 11.4 04/18/2022    NRBC 0.0 04/22/2021    SEGSPCT 84 03/03/2014    LYMPHOPCT 8.2 04/18/2022    MONOPCT 5.9 04/18/2022    BASOPCT 0.1 04/18/2022    MONOSABS 0.62 04/18/2022    LYMPHSABS 0.86 04/18/2022    EOSABS 0.03 04/18/2022    BASOSABS 0.01 04/18/2022     CMP:    Lab Results   Component Value Date     04/18/2022    K 3.8 04/18/2022    K 4.9 04/11/2022    CL 91 04/18/2022    CO2 29 04/18/2022    BUN 74 04/18/2022    CREATININE 2.4 04/18/2022    GFRAA 24 04/18/2022 LABGLOM 24 04/18/2022    GLUCOSE 204 04/18/2022    GLUCOSE 181 12/01/2011    PROT 6.2 04/18/2022    LABALBU 3.5 04/18/2022    LABALBU 5.2 12/01/2011    CALCIUM 8.8 04/18/2022    BILITOT 0.5 04/18/2022    ALKPHOS 63 04/18/2022    AST 11 04/18/2022    ALT 23 04/18/2022            Assessment/Plan:  Principal Problem:    Acute on chronic diastolic (congestive) heart failure (HCC)  Active Problems:    Chronic respiratory failure with hypoxia and hypercapnia ([facility])    DAYAN and COPD overlap syndrome ([facility])    DM2 (diabetes mellitus, type 2) ([facility])    Severe obesity (BMI 35.0-35.9 with comorbidity) ([facility]    Encounter for tobacco use cessation counseling    Prolonged Q-T interval on ECG    COPD exacerbation ([facility])    Respiratory distress    CKD (chronic kidney disease) stage 3, GFR 30-59 ml/min ([facility])    Hypertensive emergency    Acute kidney injury superimposed on CKD ([facility]  Resolved Problems:    * No resolved hospital problems. *       Slightly worse ALEC, hold further bumex BUN/Creat: 74/2.4    Duonebs, prednisone for COPD exacerbation. Supplement O2 to keep saturation greater than 92%     Suspect this chest discomfort is pleurisy. Low suspicion for PE, age adjusted d-dimer is negative and more importantly my clinical suspicion is low.     Glucose a lot better    Continue to hold spironolactone, entresto     Stress test negative    Complaints of abdominal pain - KUB ordered - mild stool    DVT prophylaxis: Lovenox  Requires continued inpatient level of care     PATRIZIA Ackerman - CNP    1:08 PM  4/18/2022

## 2022-04-18 NOTE — PROGRESS NOTES
Progress Note  4/18/2022 12:14 PM  Subjective:   Admit Date: 4/11/2022  PCP: Cici Newton, DO  Interval History:     Diet: ADULT DIET; Regular; 3 carb choices (45 gm/meal);  Low Fat/Low Chol/High Fiber/2 gm Na    Data:   Scheduled Meds:   [START ON 4/19/2022] enoxaparin  30 mg SubCUTAneous Daily    predniSONE  20 mg Oral Daily    insulin lispro  6 Units SubCUTAneous TID WC    hydrocortisone   Topical BID    [Held by provider] bumetanide  2 mg Oral Daily    oxybutynin  5 mg Oral BID    insulin glargine  25 Units SubCUTAneous Nightly    lidocaine  1 patch TransDERmal Daily    doxycycline hyclate  100 mg Oral 2 times per day    nicotine  1 patch TransDERmal Daily    labetalol  10 mg IntraVENous Once    aspirin  81 mg Oral Daily    carvedilol  25 mg Oral BID WC    cetirizine  10 mg Oral Daily    DULoxetine  60 mg Oral BID    famotidine  20 mg Oral Dinner    fluticasone  2 spray Nasal Daily    gabapentin  100 mg Oral BID    hydrALAZINE  50 mg Oral TID    ipratropium-albuterol  1 vial Nebulization Q4H WA    isosorbide mononitrate  60 mg Oral Daily    mirabegron  50 mg Oral Nightly    montelukast  10 mg Oral Nightly    pantoprazole  40 mg Oral Daily    [Held by provider] sacubitril-valsartan  1 tablet Oral BID    atorvastatin  10 mg Oral Nightly    [Held by provider] spironolactone  25 mg Oral Daily    traZODone  100 mg Oral Nightly    sodium chloride flush  5-40 mL IntraVENous 2 times per day    insulin lispro  0-12 Units SubCUTAneous TID WC    insulin lispro  0-6 Units SubCUTAneous Nightly     Continuous Infusions:   sodium chloride      dextrose       PRN Meds:trimethobenzamide, cyclobenzaprine, guaiFENesin, HYDROcodone 5 mg - acetaminophen, perflutren lipid microspheres, Arformoterol Tartrate, sodium chloride flush, sodium chloride, polyethylene glycol, acetaminophen **OR** acetaminophen, glucose, dextrose, glucagon (rDNA), dextrose, magnesium sulfate, potassium chloride **OR** potassium alternative oral replacement **OR** potassium chloride, hydrALAZINE  I/O last 3 completed shifts: In: 80 [P.O.:930]  Out: 2700 [Urine:2700]  I/O this shift:  In: -   Out: 300 [Urine:300]    Intake/Output Summary (Last 24 hours) at 4/18/2022 1214  Last data filed at 4/18/2022 0900  Gross per 24 hour   Intake 600 ml   Output 1850 ml   Net -1250 ml     CBC:   Recent Labs     04/16/22  0438 04/17/22 0449 04/18/22 0412   WBC 10.6 11.7* 10.5   HGB 14.0 13.2 13.1    265 264     BMP:    Recent Labs     04/17/22 0449 04/17/22  1626 04/18/22 0412    130* 132   K 4.5 4.7 3.8   CL 86* 90* 91*   CO2 28 27 29   BUN 74* 71* 74*   CREATININE 2.2* 2.2* 2.4*   GLUCOSE 135* 231* 204*     Hepatic:   Recent Labs     04/17/22 0449 04/18/22 0412   AST 11 11   ALT 20 23   BILITOT 0.6 0.5   ALKPHOS 55 63     Troponin: No results for input(s): TROPONINI in the last 72 hours. BNP: No results for input(s): BNP in the last 72 hours. Lipids: No results for input(s): CHOL, HDL in the last 72 hours. Invalid input(s): LDLCALCU  ABGs: No results found for: PHART, PO2ART, LYI1UII  INR: No results for input(s): INR in the last 72 hours. -----------------------------------------------------------------  RAD: XR CHEST PORTABLE    Result Date: 4/11/2022  EXAMINATION: ONE XRAY VIEW OF THE CHEST 4/11/2022 9:16 am COMPARISON: 15 July 2021 HISTORY: ORDERING SYSTEM PROVIDED HISTORY: Shortness of breath TECHNOLOGIST PROVIDED HISTORY: Reason for exam:->Shortness of breath What reading provider will be dictating this exam?->CRC FINDINGS: There is airspace disease which is somewhat better appreciated on the right but is likely bilateral.  Heart is enlarged. Pulmonary vascularity appears somewhat congested. Bilateral airspace disease likely related to cardiogenic edema. See above.        Objective:   Vitals: /60   Pulse 70   Temp 97.7 °F (36.5 °C)   Resp 17   Ht 5' 2\" (1.575 m)   Wt 210 lb (95.3 kg)   LMP 01/01/1990   SpO2 96%   BMI 38.41 kg/m²   General appearance: appears stated age   Skin:  No rashes or lesions  HEENT: Head: Normocephalic, no lesions, without obvious abnormality.   Neck: no adenopathy, no carotid bruit, no JVD, supple, symmetrical, trachea midline and thyroid not enlarged, symmetric, no tenderness/mass/nodules  Lungs: clear to auscultation bilaterally  Heart: regular rate and rhythm, S1, S2 normal, no murmur, click, rub or gallop  Abdomen: soft, non-tender; bowel sounds normal; no masses,  no organomegaly  Extremities: extremities normal, atraumatic, no cyanosis or edema  Neurologic: Mental status: Alert, oriented, thought content appropriate    Assessment:   Patient Active Problem List:     Severe obesity (BMI 35.0-35.9 with comorbidity) ([facility])     Hyperlipidemia     DAYAN and COPD overlap syndrome ([facility])     Vitamin D insufficiency     Chronic combined systolic and diastolic heart failure ([facility])     GERD (gastroesophageal reflux disease)     Major depressive disorder, recurrent episode, mild ([facility])     Diabetic polyneuropathy associated with type 2 diabetes mellitus ([facility])     Chronic passive hepatic congestion     Mixed incontinence urge and stress     Chronic back pain - d/t muscle spasm     Glaucoma, open angle     Elevated CA 19-9 level     Lumbar stenosis     Spondylosis 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     Lumbar disc herniation     Asymmetric septal hypertrophy ([facility])     Non-compliance     Hiatal hernia     Melanosis coli     Coronary artery disease involving native coronary artery of native heart without angina pectoris     DM2 (diabetes mellitus, type 2) ([facility])     Cigarette smoker     Marijuana use, smoked     Ischemic cardiomyopathy     Encounter for tobacco use cessation counseling     PVD (peripheral vascular disease) with claudication ([facility])     Chronic venous insufficiency     History of non-ST elevation myocardial infarction (NSTEMI)     Prolonged Q-T interval on ECG     History of stroke     COPD exacerbation (HCC)     Chronic respiratory failure with hypoxia and hypercapnia (HCC)     COPD (chronic obstructive pulmonary disease) (HCC)     Status post peripheral artery angioplasty     Uncontrolled hypertension     O2 dependent     Respiratory distress     Acute on chronic diastolic (congestive) heart failure (HCC)     CKD (chronic kidney disease) stage 3, GFR 30-59 ml/min (HCC)     Hypertensive emergency     Acute kidney injury superimposed on CKD ([facility]    Plan:     1) ALEC  Superimposed on CKD 3 , Cardio Renal syndrome , currently CHF compensated       Enteresto  aldactone ,  Bumex on hold , U/O 2700 cc , remains non oliguric , fair po intake     2) H/O CHF , diminished EF , NM scan results noted , for cardiac cath once renal functions       Better     3) Hyperphosphatemia better     4) Morbid obesity     5) HTN controlled     6) Type 2 DM , sugars fair     Continue present care , we will follow           dAam Turpin MD

## 2022-04-18 NOTE — PROGRESS NOTES
Occupational Therapy  OT BEDSIDE TREATMENT NOTE   [address] [facility] [address] [facility]  [address] [place] [address]       Date:2022  Patient Name: Megan Pride  MRN: [id number]  : 1955  Room: [facility]     Per OT Eval:    Evaluating OT: Valerie Morley OTR/L #[id number]      Referring Provider: TONY Dupree  Specific Provider Orders/Date: OT eval and treat 22     Diagnosis: Respiratory distress [R06.03]  CHF (congestive heart failure), NYHA class I, acute on chronic, combined ([address] [I50.43]   Pt admitted to hospital with SOB.    Pertinent Medical History:  has a past medical history of Asthma, Atherosclerosis of native artery of left leg with rest pain ([address] CAD (coronary artery disease), Cellulitis and abscess of trunk, Cerebellar infarct (HCC), Chronic back pain, Chronic kidney disease, Chronic systolic congestive heart failure ([address] Chronic venous insufficiency, COPD (chronic obstructive pulmonary disease) ([address] COVID-19, Depression, Diabetes mellitus ([address] Diabetic neuropathy ([address] Diverticulosis, Fatty liver, Glaucoma, open angle, Hepatic encephalopathy ([address] Hiatal hernia, History of blood transfusion, Hyperlipidemia, Hyperplastic colon polyp, Hypertension, Incontinence, Liver mass, Morbid obesity ([address] Movement disorder, Myocardial infarction ([address] O2 dependent, Osteoarthritis, generalized, Pain in both lower legs, Peripheral vascular disease ([address] Pneumonia, Pulmonary edema, PVD (peripheral vascular disease) with claudication ([address] Sleep apnea, Status post peripheral artery angioplasty, Tobacco abuse, Tobacco abuse, Tubular adenoma polyp of rectum, Urinary tract infection due to ESBL Klebsiella, and Ventral hernia.      Precautions:  Fall Risk, O2,      Assessment of current deficits    [x]? Functional mobility          [x]? ADLs           [x]? Strength                  []?Cognition    [x]? Functional transfers        [x]?  IADLs [x]? Safety Awareness   [x]? Endurance    []? Fine Coordination                        [x]? Balance      []? Vision/perception   []? Sensation      []? Gross Motor Coordination            []? ROM           []? Delirium                   []? Motor Control      OT PLAN OF CARE   OT POC based on physician orders, patient diagnosis and results of clinical assessment     Frequency/Duration 1-3 days/wk for 2 weeks PRN   Specific OT Treatment Interventions to include:   * Instruction/training on adapted ADL techniques and AE recommendations to increase functional independence within precautions       * Training on energy conservation strategies, correct breathing pattern and techniques to improve independence/tolerance for self-care routine  * Functional transfer/mobility training/DME recommendations for increased independence, safety, and fall prevention  * Patient/Family education to increase follow through with safety techniques and functional independence  * Recommendation of environmental modifications for increased safety with functional transfers/mobility and ADLs  * Therapeutic exercise to improve motor endurance, ROM, and functional strength for ADLs/functional transfers  * Therapeutic activities to facilitate/challenge dynamic balance, stand tolerance for increased safety and independence with ADLs  * Therapeutic activities to facilitate gross/fine motor skills for increased independence with ADLs  * Neuro-muscular re-education: facilitation of righting/equilibrium reactions, midline orientation, scapular stability/mobility, normalization of muscle tone, and facilitation of volitional active controled movement     Recommended Adaptive Equipment:  4/18/22 issued AE, reacher & sock aide      Home Living: Pt lives alone in a 1 story home with 2 VAZQUEZ and 1 hand rail.      Bathroom setup: walk-in shower with seat    Equipment owned: rollator, cane, shower chair     Prior Level of Function: assist prn with ADLs , assist prn with IADLs; ambulated with rollator (Pt reports [facility] aides 7 days /week for 5 hours per day; they assist prn)   Driving: no   Occupation: na     Pain Level: L flank pain, nsg aware & present 20/10 but appeared to tolerate therapy well     Cognition: A&O: 4/4; Follows 2 step directions, very pleasant & cooperative             Memory:  good             Sequencing:  good             Problem solving:  fair             Judgement/safety:  fair               Functional Assessment:  AM-PAC Daily Activity Raw Score: 19/24    Initial Eval Status  Date: 4/12/22 Treatment Status  Date:  4/18/22 STGs = LTGs  Time frame: 10-14 days   Feeding Independent        Grooming Stand by Assist      seated Set up  Seated  Modified Dunn    UB Dressing Stand by Assist   Set up  Todd gown seated  Modified [place]    LB Dressing Minimal Assist  SBA  Good recall of use of sock aide, pt able to todd socks with good results Modified Dunn    Bathing Minimal Assist  SBA  Simulated  Modified Dunn    Toileting Minimal Assist   NT Modified Dunn    Bed Mobility  Supine to sit: Stand by Assist   Sit to supine: Stand by Assist  CGA  Supine to sit Supine to sit: Modified Dunn   Sit to supine: Modified Dunn    Functional Transfers Stand by Assist  SBA Modified Dunn    Functional Mobility Stand by Assist  SBA  Walker, household distances  Modified Dunn    Balance Sitting:     Static:  Sup    Dynamic:SBA  Standing: SBA  Sitting: Indep  Standing: SBA  With walker      Activity Tolerance F-     Limited due to fatigue and SOB Fair  Moderate tasks  O2 94% at rest  Improved 96-95% after mobility, mild SOB noted after exertion  F+   Visual/  Perceptual wfl                       Education:  Pt was educated through out treatment regarding proper technique & safety with bed mobility, functional transfers & mobility, energy conservation technique, deep breathing techniques with SOB & use of AE along with modified ADL techniques to ease tasks, improve safety & prevent falls to return home safely. Comments: Upon arrival pt was in bed, talking with nsg & agreeable for therapy. At end of session pt was seated in chair, cardiologist arrived, all lines and tubes intact, call light within reach. · Pt has made Good progress towards set goals. · Continue with current plan of care      Treatment Time In: 3:10         Treatment Time Out: 3:35          Treatment Charges: Mins Units   Ther Ex  [id number]     Manual Therapy [id number]     Thera Activities [id number] 15 1   ADL/Home Mgt [id number] 10 1   Neuro Re-ed [id number]     Group Therapy      Orthotic manage/training  [id number]     Non-Billable Time     Total Timed Treatment 25 2       Holley EDGE  [address] [place] [address] Drive, [address] [place] [address]

## 2022-04-18 NOTE — PROGRESS NOTES
Inpatient Cardiology Progress note     PATIENT IS BEING FOLLOWED FOR:     Adolph Espinosa is a 78 y.o. female who follows with Dr Ros Cason        PMHx: Reaganial Ferraris, CAD with history of CABG (2011) x 2, Obesity, HTN, HLD, T2DM, Tobacco and marijuana use, PAD with history of left popiteal and tibial arthrectomy/angioplasty, Chronic RBBB, Hx of COVID-19 pneumonia and COPD. SUBJECTIVE: still complaining of SOB at rest.   OBJECTIVE: No apparent distress, lying flat in bed      ROS:  Consist: Denies fevers, chills or night sweats  Heart: Denies chest pain, palpitations, lightheadedness, dizziness or syncope  Lungs: Denies SOB, cough, wheezing, orthopnea or PND  GI: Denies abdominal pain, vomiting or diarrhea    PHYSICAL EXAM:   /60   Pulse 70   Temp 97.7 °F (36.5 °C)   Resp 17   Ht 5' 2\" (1.575 m)   Wt 210 lb (95.3 kg)   LMP 01/01/1990   SpO2 96%   BMI 38.41 kg/m²      CONST:  Well developed, obese  female  who appears her stated age. Awake, alert, cooperative, no apparent distress  HEENT:   Head- Normocephalic, atraumatic   Eyes- Conjunctivae pink, anicteric  Throat- Oral mucosa pink and moist  Neck-  No stridor, trachea midline, no jugular venous distention. CHEST: Chest symmetrical and non-tender to palpation. RESPIRATORY: coarse breath sounds bilaterally, + rales / wheeze   CARDIOVASCULAR:     No carotid bruit noted bilaterally   Heart Ausculation- Regular rate and rhythm, no murmur. No s3, s4 or rub   PV: No lower extremity edema. No varicosities. ABDOMEN: Soft, non-tender to light palpation. Bowel sounds present. MS: Good muscle strength and tone. No atrophy or abnormal movements. : Deferred  SKIN: Warm and dry no statis dermatitis or ulcers   NEURO / PSYCH: Oriented to person, place and time. Speech clear and appropriate. Follows all commands.  Pleasant affect       Intake/Output Summary (Last 24 hours) at 4/18/2022 1336  Last data filed at 4/18/2022 0900  Gross per 24 hour Intake 600 ml   Output 1850 ml   Net -1250 ml       Weight:   Wt Readings from Last 3 Encounters:   04/18/22 210 lb (95.3 kg)   04/04/22 220 lb (99.8 kg)   03/09/22 220 lb (99.8 kg)     Current Inpatient Medications:   [START ON 4/19/2022] enoxaparin  30 mg SubCUTAneous Daily    predniSONE  20 mg Oral Daily    insulin lispro  6 Units SubCUTAneous TID WC    hydrocortisone   Topical BID    [Held by provider] bumetanide  2 mg Oral Daily    oxybutynin  5 mg Oral BID    insulin glargine  25 Units SubCUTAneous Nightly    lidocaine  1 patch TransDERmal Daily    doxycycline hyclate  100 mg Oral 2 times per day    nicotine  1 patch TransDERmal Daily    labetalol  10 mg IntraVENous Once    aspirin  81 mg Oral Daily    carvedilol  25 mg Oral BID WC    cetirizine  10 mg Oral Daily    DULoxetine  60 mg Oral BID    famotidine  20 mg Oral Dinner    fluticasone  2 spray Nasal Daily    gabapentin  100 mg Oral BID    hydrALAZINE  50 mg Oral TID    ipratropium-albuterol  1 vial Nebulization Q4H WA    isosorbide mononitrate  60 mg Oral Daily    mirabegron  50 mg Oral Nightly    montelukast  10 mg Oral Nightly    pantoprazole  40 mg Oral Daily    [Held by provider] sacubitril-valsartan  1 tablet Oral BID    atorvastatin  10 mg Oral Nightly    [Held by provider] spironolactone  25 mg Oral Daily    traZODone  100 mg Oral Nightly    sodium chloride flush  5-40 mL IntraVENous 2 times per day    insulin lispro  0-12 Units SubCUTAneous TID WC    insulin lispro  0-6 Units SubCUTAneous Nightly       IV Infusions (if any):   sodium chloride      dextrose         DIAGNOSTIC/ LABORATORY DATA:  Labs:   CBC:   Recent Labs     04/17/22  0449 04/18/22  0412   WBC 11.7* 10.5   HGB 13.2 13.1   HCT 40.7 41.3    264     BMP:   Recent Labs     04/17/22  1626 04/18/22  0412   * 132   K 4.7 3.8   CO2 27 29   BUN 71* 74*   CREATININE 2.2* 2.4*   LABGLOM 27 24   CALCIUM 9.3 8.8     Mag:   Recent Labs 04/17/22  0449 04/18/22  0412   MG 1.7 1.6     Phos:   Recent Labs     04/17/22  0449 04/18/22  0412   PHOS 5.4* 4.5     HgA1c:   Lab Results   Component Value Date    LABA1C 6.9 (H) 07/16/2021     FASTING LIPID PANEL:  Lab Results   Component Value Date    CHOL 129 04/12/2022    HDL 32 04/12/2022    LDLCALC 74 04/12/2022    TRIG 114 04/12/2022     LIVER PROFILE:  Recent Labs     04/17/22  0449 04/18/22  0412   AST 11 11   ALT 20 23   LABALBU 3.7 3.5       CXR 4/17/2022  1.  Hypoinflation with increased bibasilar airspace opacities.  Differential  includes atelectasis, infection, and/or layering pleural effusions.     2.  Mild pulmonary vascular congestion again noted. Telemetry:          TTE 06/11/2021 (Dr. Trey Mcintyre): Left ventricle size is normal. Ejection fraction is visually estimated at 55-60%. No regional wall motion abnormalities seen. Severe concentric left ventricular hypertrophy. Indeterminate diastolic function. Normal right ventricular size. Right ventricle global systolic function is mildly reduced . TAPSE 15 mm.  No hemodynamically significant aortic stenosis is present. Physiologic and/or trace tricuspid regurgitation. RVSP is 26 mmHg. Normal estimated PA systolic pressure. No evidence for hemodynamically significant pericardial effusion    TTE 4/11/2022 ([facility]):    Technically difficult examination. Severe concentric left ventricular hypertrophy. Left ventricular diastolic filling is elevated . Ejection fraction is visually estimated at 50 to 55%. Normal right ventricular size and function. Mild mitral regurgitation is present. Mild tricuspid regurgitation. Lexiscan MPS 4/11/2022  1: There is a medium size, moderate intensity scar in the inferior, inferior lateral and lateral wall with partial reversibility.  2  The inferior and inferior lateral wall appears mildly hypokinetic with estimated left ventricular ejection fraction of 40% 3: Low dose CT attenuation correction protocol was utilized for this study 4: Please see separate report for EKG and hemodynamic aspect of the stress test. 5: RISK SCAN: Intermediate risk scan       Assessment:  1. Acute hypoxic respiratory failure, requiring supplemental O2 on admission --> on RA now. 2. Acute COPD exacerbation, improving. H/o tobacco abuse, reportedly quit 4/9/2022   3. Acute on chronic HF with recovered EF (LVEF 35-45% on TTE 1/201 -->  LVEF 65% on TTE 6/5/2018 --> LVEF 55-60% on TTE 6/11/2021 -- > 50-55 4/11/2022). IV diuresed 8 L since admission. IV Bumex on hold   4. ALEC, Scr trending upward. Nephrology following. Scr 1.4 --> 1.2 --> 1.6--> 1.7 --> 2.2 ---> 2.4. Bumex,Aldactone and Entresto on hold. 5. Borderline elevated Hs-Rohan (12 -- >10). Pattern not consistent with ACS. Lexiscan MPS 4/16/2022 with medium sized moderate intensity scar of inferior, inferiolateral and lateral wall with partial reversibility. H/o CAD status post CABG in 2011 (LIMA-LAD, SVG-OM1). 6. Hypertension/LVH  7. Chronic right bundle branch block  8. Obesity: BMI 38.59 kg/m2  9. DAYAN: On trilogy (unable to tolerate CPAP)  10. Sinus bradycardia   11. Hyperlipidemia: intolerant to Lipitor / Tolerates Zocor 20 mg QD. 12. Type 2 diabetes mellitus  13. Current Tobacco and marijuana use  14. PAD status post left popliteal and tibial atherectomy/angioplasty         Plan:  · Aggressive medical management for abnormal stress for now -- recommend re evaluation for outpatient cardiac catheterization once kidney function improves   · Continue to hold Entresto, Aldactone and Bumex. · GDMT: continue Coreg, Hydralzine/Nitrate (while kidney function recovers.  Plan to resume Entresto / Aldactone when improved)  · Continue ASA /statin   · Aggressive risk factor modification including diet exercise and weight loss discussed with the patient   · Tobacco and marijuana use cessation discussed with the patient   · Consider pulmonology consult for COPD exacerbation   · Follow up with Dr Preeti Arevalo in the office 3-4 week after discharge. Check BMP prior to office visit   · Above discussed with Dr Flo Moritz     Electronically signed by Jose Dobbins on 4/18/2022 at 1:36 PM    ______________________________________________________________________  Cardiology attending attestation:  I have independently interviewed and examined the patient. I personally reviewed pertinent laboratory values and diagnostic testing (including, if applicable, chest xray, electrocardiogram, telemetry, echocardiogram, stress testing, and coronary angiography). I have reviewed the above documentation completed by TONY Dobbins and agree with the findings, assessment and plan except where noted. Plan formulated under my direct supervision. I participated in all aspects of the medical decision making. Please see my additional contributions to the HPI, physical exam, and assessment / medical decision making:  _______________________________________________________________________    Patient seen ambulating with physical therapy, denies exertional chest pain. States still feels like she is coughing but can mobilize sputum. She is quit smoking a few weeks ago. Stress test images personally reviewed, she does have reversible defect anterolateral wall. Creatinine continues to worsen up to 2.4. Trivial troponin elevation admission at 12 and 10 ng/L. Physical Exam:  BP (!) 125/55   Pulse 70   Temp 97.7 °F (36.5 °C)   Resp 16   Ht 5' 2\" (1.575 m)   Wt 210 lb (95.3 kg)   LMP 01/01/1990   SpO2 95%   BMI 38.41 kg/m²   General appearance: Obese, awake, alert, no acute respiratory distress  Skin: Intact, no rash  ENMT: Moist mucous membranes  Neck: Supple, no carotid bruits. Normal jugular venous pressure  Lungs: Clear to auscultation bilaterally.  No wheezes, rales, or rhonchi  Cardiac: Regular rhythm with a normal rate, normal S1&S2, no murmurs apparent  Abdomen: Soft, positive bowel sounds, nontender  Extremities: Moves all extremities x 4, no lower extremity edema  Neurologic: No focal motor deficits apparent, normal mood and affect    Telemetry: Sinus rhythm 70s  EK2022: Sinus rhythm 94 beats minute right bundle branch block QRS duration 138 ms    Recommendations:  Given worsening renal function, lack of anginal symptoms and insignificant troponin recommend medical management at this time. Reconsider for heart catheterization once acute issues and renal failure resolved, unless develops refractory anginal symptoms or arrhythmias.  Diuretics, sacubitril/valsartan, spironolactone on hold   Continue beta-blocker, nitrates, aspirin and statin -increase isosorbide mononitrate to 90 mg daily   Further care per primary and other consultants   Outpatient follow-up with Dr. Fabiola Cobb for discharge from cardiology standpoint   Will be available as needed, please call with questions    Thank you for the consultation. Please do not hesitate to call with questions.     Lu Mckinley MD, [facility]

## 2022-04-18 NOTE — PROGRESS NOTES
Physical Therapy  Treatment Note    Name: Dulce Daniel  : 1955  MRN: [id number]      Date of Service: 2022    Evaluating PT:  Riley Judekaron, PT, DPT [id number]     Room #:  3222/3943-B  Diagnosis:  Respiratory distress [R06.03]  CHF (congestive heart failure), NYHA class I, acute on chronic, combined ([facility] [I50.43]  Reason for admission: SOB   Precautions:  Falls,   Procedure/Surgery:  None   Equipment Recommendations:  Return to use of rollator     SUBJECTIVE:  Pt lives alone in a 1 story home with 2 VAZQUEZ 1 rail. Pt ambulated with rollator PTA. OBJECTIVE:   Initial Evaluation  Date:  Treatment Date: 22   Short Term/ Long Term   Goals   AM-PAC 6 Clicks  91/58    Was pt agreeable to Eval/treatment? Yes  Yes    Does pt have pain? 10/10 back and LEs Chest pain    Bed Mobility  Rolling: NT  Supine to sit: SBA  Sit to supine: SBA  Scooting: SBA Rolling: NT  Supine to sit: NT  Sit to supine: NT  Scooting: NT Independent    Transfers Sit to stand: SBA  Stand to sit: SBA  Stand pivot: NT Sit to stand: SBA  Stand to sit: SBA  Stand pivot: SBA with [facility] Independent    Ambulation    20 feet with [facility] SBA  -pt limited distance 150, 50 feet with WW with SBA >200 feet with AAD Mod I    Stair negotiation: ascended and descended  NT NT TBD     Pt is A & O x: 4 to person, place, month/year, and situation. Sensation: Pt denies numbness and tingling of extremities. Edema: Unremarkable. Patient education  Pt educated on PT role in acute care setting. Patient response to education:   Pt verbalized understanding Pt demonstrated skill Pt requires further education in this area   Yes NA No     ASSESSMENT:    Comments:    Pt was ambulating in hallway with OT upon unit entry, agreeable to PT treatment. Pt was on RA today but reports chest pain when ambulating. Pt states she feels congested and has difficulty \"getting anything out\". Pt ambulated extended distance with WW. Gait was slow but very steady.  Pt was safe with [facility] [place]. Pt was mildly SOB. Pt was agreeable to trial stair negotiation after seated rest break. Pt was seated in chair. O2 sat was 93-95% on RA. SOB improved with rest. Cardiologist arrived to assess. Pt requested to ambulate back to room for assessment. Pt ambulated back to bedside chair and was seated. Pt was left in chair with cardiologist present at conclusion of session. Will attempt stairs in future sessions. RN notified. Treatment:  Patient practiced and was instructed in the following treatment:     Therapeutic activities:  o Transfers: Pt was cued for hand placement during sit <> stand transfers. o Ambulation: Pt ambulated x2 reps with WW. Pt was cued for posture, pursed lip breathing, and pacing herself. o Vitals and symptoms were closely monitored throughout session. PLAN:    Patient is making Good progress towards established goals. Will continue with current POC.       Time in: 1530  Time out: 1545    Total Treatment Time 15 minutes     CPT codes:  [] Gait training 00384 0 minutes  [] Manual therapy 81682 0 minutes  [x] Therapeutic activities 37313 15 minutes  [] Therapeutic exercises 22250 0 minutes  [] Neuromuscular reeducation 83289 0 minutes      Laina Boswell, PT, DPT   [id number]

## 2022-04-19 VITALS
BODY MASS INDEX: 38.68 KG/M2 | HEART RATE: 75 BPM | SYSTOLIC BLOOD PRESSURE: 126 MMHG | HEIGHT: 62 IN | TEMPERATURE: 97.5 F | WEIGHT: 210.2 LBS | RESPIRATION RATE: 17 BRPM | OXYGEN SATURATION: 100 % | DIASTOLIC BLOOD PRESSURE: 69 MMHG

## 2022-04-19 LAB
ANION GAP SERPL CALCULATED.3IONS-SCNC: 10 MMOL/L (ref 7–16)
BASOPHILS ABSOLUTE: 0.01 E9/L (ref 0–0.2)
BASOPHILS RELATIVE PERCENT: 0.1 % (ref 0–2)
BUN BLDV-MCNC: 61 MG/DL (ref 6–23)
CALCIUM SERPL-MCNC: 8.9 MG/DL (ref 8.6–10.2)
CHLORIDE BLD-SCNC: 96 MMOL/L (ref 98–107)
CO2: 31 MMOL/L (ref 22–29)
CREAT SERPL-MCNC: 1.9 MG/DL (ref 0.5–1)
EOSINOPHILS ABSOLUTE: 0.08 E9/L (ref 0.05–0.5)
EOSINOPHILS RELATIVE PERCENT: 0.8 % (ref 0–6)
GFR AFRICAN AMERICAN: 32
GFR NON-AFRICAN AMERICAN: 32 ML/MIN/1.73
GLUCOSE BLD-MCNC: 154 MG/DL (ref 74–99)
HCT VFR BLD CALC: 40.4 % (ref 34–48)
HEMOGLOBIN: 13 G/DL (ref 11.5–15.5)
IMMATURE GRANULOCYTES #: 0.07 E9/L
IMMATURE GRANULOCYTES %: 0.7 % (ref 0–5)
LYMPHOCYTES ABSOLUTE: 1.19 E9/L (ref 1.5–4)
LYMPHOCYTES RELATIVE PERCENT: 12.4 % (ref 20–42)
MCH RBC QN AUTO: 31.5 PG (ref 26–35)
MCHC RBC AUTO-ENTMCNC: 32.2 % (ref 32–34.5)
MCV RBC AUTO: 97.8 FL (ref 80–99.9)
METER GLUCOSE: 186 MG/DL (ref 74–99)
MONOCYTES ABSOLUTE: 0.56 E9/L (ref 0.1–0.95)
MONOCYTES RELATIVE PERCENT: 5.9 % (ref 2–12)
NEUTROPHILS ABSOLUTE: 7.66 E9/L (ref 1.8–7.3)
NEUTROPHILS RELATIVE PERCENT: 80.1 % (ref 43–80)
PDW BLD-RTO: 11.5 FL (ref 11.5–15)
PLATELET # BLD: 252 E9/L (ref 130–450)
PMV BLD AUTO: 10.2 FL (ref 7–12)
POTASSIUM REFLEX MAGNESIUM: 4.2 MMOL/L (ref 3.5–5)
RBC # BLD: 4.13 E12/L (ref 3.5–5.5)
SODIUM BLD-SCNC: 137 MMOL/L (ref 132–146)
WBC # BLD: 9.6 E9/L (ref 4.5–11.5)

## 2022-04-19 PROCEDURE — 36415 COLL VENOUS BLD VENIPUNCTURE: CPT

## 2022-04-19 PROCEDURE — 2580000003 HC RX 258: Performed by: PHYSICIAN ASSISTANT

## 2022-04-19 PROCEDURE — 6370000000 HC RX 637 (ALT 250 FOR IP): Performed by: INTERNAL MEDICINE

## 2022-04-19 PROCEDURE — 85025 COMPLETE CBC W/AUTO DIFF WBC: CPT

## 2022-04-19 PROCEDURE — 94640 AIRWAY INHALATION TREATMENT: CPT

## 2022-04-19 PROCEDURE — 82962 GLUCOSE BLOOD TEST: CPT

## 2022-04-19 PROCEDURE — 6370000000 HC RX 637 (ALT 250 FOR IP): Performed by: PHYSICIAN ASSISTANT

## 2022-04-19 PROCEDURE — 94660 CPAP INITIATION&MGMT: CPT

## 2022-04-19 PROCEDURE — 80048 BASIC METABOLIC PNL TOTAL CA: CPT

## 2022-04-19 PROCEDURE — 6360000002 HC RX W HCPCS: Performed by: PHYSICIAN ASSISTANT

## 2022-04-19 RX ORDER — NICOTINE 21 MG/24HR
1 PATCH, TRANSDERMAL 24 HOURS TRANSDERMAL DAILY
Qty: 30 PATCH | Refills: 3 | Status: SHIPPED | OUTPATIENT
Start: 2022-04-20 | End: 2022-06-24

## 2022-04-19 RX ORDER — ISOSORBIDE MONONITRATE 30 MG/1
90 TABLET, EXTENDED RELEASE ORAL DAILY
Qty: 30 TABLET | Refills: 3 | Status: SHIPPED | OUTPATIENT
Start: 2022-04-20 | End: 2022-07-06 | Stop reason: SDUPTHER

## 2022-04-19 RX ADMIN — HYDRALAZINE HYDROCHLORIDE 50 MG: 25 TABLET, FILM COATED ORAL at 07:39

## 2022-04-19 RX ADMIN — IPRATROPIUM BROMIDE AND ALBUTEROL SULFATE 3 ML: .5; 2.5 SOLUTION RESPIRATORY (INHALATION) at 11:16

## 2022-04-19 RX ADMIN — CARVEDILOL 25 MG: 25 TABLET, FILM COATED ORAL at 07:38

## 2022-04-19 RX ADMIN — INSULIN LISPRO 2 UNITS: 100 INJECTION, SOLUTION INTRAVENOUS; SUBCUTANEOUS at 11:33

## 2022-04-19 RX ADMIN — OXYBUTYNIN CHLORIDE 5 MG: 5 TABLET ORAL at 07:38

## 2022-04-19 RX ADMIN — POLYETHYLENE GLYCOL 3350 17 G: 17 POWDER, FOR SOLUTION ORAL at 07:37

## 2022-04-19 RX ADMIN — ISOSORBIDE MONONITRATE 90 MG: 30 TABLET, EXTENDED RELEASE ORAL at 07:37

## 2022-04-19 RX ADMIN — CYCLOBENZAPRINE 5 MG: 10 TABLET, FILM COATED ORAL at 07:38

## 2022-04-19 RX ADMIN — GABAPENTIN 100 MG: 100 CAPSULE ORAL at 07:38

## 2022-04-19 RX ADMIN — INSULIN LISPRO 2 UNITS: 100 INJECTION, SOLUTION INTRAVENOUS; SUBCUTANEOUS at 07:54

## 2022-04-19 RX ADMIN — DOCUSATE SODIUM 50 MG AND SENNOSIDES 8.6 MG 1 TABLET: 8.6; 5 TABLET, FILM COATED ORAL at 07:38

## 2022-04-19 RX ADMIN — ASPIRIN 81 MG 81 MG: 81 TABLET ORAL at 07:39

## 2022-04-19 RX ADMIN — DOXYCYCLINE HYCLATE 100 MG: 100 CAPSULE ORAL at 07:38

## 2022-04-19 RX ADMIN — SODIUM CHLORIDE, PRESERVATIVE FREE 10 ML: 5 INJECTION INTRAVENOUS at 07:37

## 2022-04-19 RX ADMIN — PANTOPRAZOLE SODIUM 40 MG: 40 TABLET, DELAYED RELEASE ORAL at 07:38

## 2022-04-19 RX ADMIN — ENOXAPARIN SODIUM 30 MG: 100 INJECTION SUBCUTANEOUS at 07:37

## 2022-04-19 RX ADMIN — CETIRIZINE HYDROCHLORIDE 10 MG: 10 TABLET, FILM COATED ORAL at 07:39

## 2022-04-19 RX ADMIN — IPRATROPIUM BROMIDE AND ALBUTEROL SULFATE 3 ML: .5; 2.5 SOLUTION RESPIRATORY (INHALATION) at 08:17

## 2022-04-19 RX ADMIN — FLUTICASONE PROPIONATE 2 SPRAY: 50 SPRAY, METERED NASAL at 07:36

## 2022-04-19 RX ADMIN — IPRATROPIUM BROMIDE AND ALBUTEROL SULFATE 3 ML: .5; 2.5 SOLUTION RESPIRATORY (INHALATION) at 16:02

## 2022-04-19 RX ADMIN — INSULIN LISPRO 6 UNITS: 100 INJECTION, SOLUTION INTRAVENOUS; SUBCUTANEOUS at 11:35

## 2022-04-19 RX ADMIN — INSULIN LISPRO 6 UNITS: 100 INJECTION, SOLUTION INTRAVENOUS; SUBCUTANEOUS at 07:40

## 2022-04-19 RX ADMIN — DULOXETINE HYDROCHLORIDE 60 MG: 60 CAPSULE, DELAYED RELEASE ORAL at 07:38

## 2022-04-19 RX ADMIN — HYDRALAZINE HYDROCHLORIDE 50 MG: 25 TABLET, FILM COATED ORAL at 13:26

## 2022-04-19 ASSESSMENT — PULMONARY FUNCTION TESTS
PEFR_L/MIN: 16
PEFR_L/MIN: 14
PEFR_L/MIN: 16

## 2022-04-19 ASSESSMENT — PAIN SCALES - GENERAL: PAINLEVEL_OUTOF10: 0

## 2022-04-19 NOTE — PROGRESS NOTES
Progress Note  4/19/2022 11:45 AM  Subjective:   Admit Date: 4/11/2022  PCP: Cici Newton, DO  Interval History: patient feeling better , no complains     Diet: ADULT DIET; Regular; 3 carb choices (45 gm/meal);  Low Fat/Low Chol/High Fiber/2 gm Na    Data:   Scheduled Meds:   enoxaparin  30 mg SubCUTAneous Daily    sennosides-docusate sodium  1 tablet Oral BID    polyethylene glycol  17 g Oral Daily    isosorbide mononitrate  90 mg Oral Daily    insulin lispro  6 Units SubCUTAneous TID     [Held by provider] bumetanide  2 mg Oral Daily    oxybutynin  5 mg Oral BID    insulin glargine  25 Units SubCUTAneous Nightly    lidocaine  1 patch TransDERmal Daily    doxycycline hyclate  100 mg Oral 2 times per day    nicotine  1 patch TransDERmal Daily    aspirin  81 mg Oral Daily    carvedilol  25 mg Oral BID WC    cetirizine  10 mg Oral Daily    DULoxetine  60 mg Oral BID    famotidine  20 mg Oral Dinner    fluticasone  2 spray Nasal Daily    gabapentin  100 mg Oral BID    hydrALAZINE  50 mg Oral TID    ipratropium-albuterol  1 vial Nebulization Q4H WA    mirabegron  50 mg Oral Nightly    montelukast  10 mg Oral Nightly    pantoprazole  40 mg Oral Daily    [Held by provider] sacubitril-valsartan  1 tablet Oral BID    atorvastatin  10 mg Oral Nightly    [Held by provider] spironolactone  25 mg Oral Daily    traZODone  100 mg Oral Nightly    sodium chloride flush  5-40 mL IntraVENous 2 times per day    insulin lispro  0-12 Units SubCUTAneous TID     insulin lispro  0-6 Units SubCUTAneous Nightly     Continuous Infusions:   sodium chloride      dextrose       PRN Meds:trimethobenzamide, bisacodyl, cyclobenzaprine, guaiFENesin, HYDROcodone 5 mg - acetaminophen, perflutren lipid microspheres, Arformoterol Tartrate, sodium chloride flush, sodium chloride, polyethylene glycol, acetaminophen **OR** acetaminophen, glucose, dextrose, glucagon (rDNA), dextrose, magnesium sulfate, potassium chloride **OR** potassium alternative oral replacement **OR** potassium chloride, hydrALAZINE  I/O last 3 completed shifts: In: 600 [P.O.:600]  Out: 2800 [Urine:2800]  No intake/output data recorded. Intake/Output Summary (Last 24 hours) at 4/19/2022 1145  Last data filed at 4/19/2022 0558  Gross per 24 hour   Intake 360 ml   Output 1400 ml   Net -1040 ml     CBC:   Recent Labs     04/17/22  0449 04/18/22  0412 04/19/22  0549   WBC 11.7* 10.5 9.6   HGB 13.2 13.1 13.0    264 252     BMP:    Recent Labs     04/17/22  1626 04/18/22 0412 04/19/22  0549   * 132 137   K 4.7 3.8 4.2   CL 90* 91* 96*   CO2 27 29 31*   BUN 71* 74* 61*   CREATININE 2.2* 2.4* 1.9*   GLUCOSE 231* 204* 154*     Hepatic:   Recent Labs     04/17/22 0449 04/18/22 0412   AST 11 11   ALT 20 23   BILITOT 0.6 0.5   ALKPHOS 55 63     Troponin: No results for input(s): TROPONINI in the last 72 hours. BNP: No results for input(s): BNP in the last 72 hours. Lipids: No results for input(s): CHOL, HDL in the last 72 hours. Invalid input(s): LDLCALCU  ABGs: No results found for: PHART, PO2ART, DOW5GPX  INR: No results for input(s): INR in the last 72 hours. -----------------------------------------------------------------  RAD: XR CHEST PORTABLE    Result Date: 4/11/2022  EXAMINATION: ONE XRAY VIEW OF THE CHEST 4/11/2022 9:16 am COMPARISON: 15 July 2021 HISTORY: ORDERING SYSTEM PROVIDED HISTORY: Shortness of breath TECHNOLOGIST PROVIDED HISTORY: Reason for exam:->Shortness of breath What reading provider will be dictating this exam?->CRC FINDINGS: There is airspace disease which is somewhat better appreciated on the right but is likely bilateral.  Heart is enlarged. Pulmonary vascularity appears somewhat congested. Bilateral airspace disease likely related to cardiogenic edema. See above.        Objective:   Vitals: BP (!) 162/68   Pulse 64   Temp 97.9 °F (36.6 °C) (Temporal)   Resp 22   Ht 5' 2\" (1.575 m)   Wt 210 lb 3.2 oz (95.3 kg)   [facility] 01/01/1990   SpO2 92%   BMI 38.45 kg/m²   General appearance: appears stated age   Skin:  No rashes or lesions  HEENT: Head: Normocephalic, no lesions, without obvious abnormality.   Neck: no adenopathy, no carotid bruit, no JVD, supple, symmetrical, trachea midline and thyroid not enlarged, symmetric, no tenderness/mass/nodules  Lungs: clear to auscultation bilaterally  Heart: regular rate and rhythm, S1, S2 normal, no murmur, click, rub or gallop  Abdomen: soft, non-tender; bowel sounds normal; no masses,  no organomegaly  Extremities: edema minimal   Neurologic: Mental status: Alert, oriented, thought content appropriate    Assessment:   Patient Active Problem List:     Severe obesity (BMI 35.0-35.9 with comorbidity) ([facility])     Hyperlipidemia     DAYAN and COPD overlap syndrome ([facility])     Vitamin D insufficiency     Chronic combined systolic and diastolic heart failure ([facility])     GERD (gastroesophageal reflux disease)     Major depressive disorder, recurrent episode, mild ([facility])     Diabetic polyneuropathy associated with type 2 diabetes mellitus ([facility])     Chronic passive hepatic congestion     Mixed incontinence urge and stress     Chronic back pain - d/t muscle spasm     Glaucoma, open angle     Elevated CA 19-9 level     Lumbar stenosis     Spondylosis 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     Lumbar disc herniation     Asymmetric septal hypertrophy ([facility])     Non-compliance     Hiatal hernia     Melanosis coli     Coronary artery disease involving native coronary artery of native heart without angina pectoris     DM2 (diabetes mellitus, type 2) ([facility])     Cigarette smoker     Marijuana use, smoked     Ischemic cardiomyopathy     Encounter for tobacco use cessation counseling     PVD (peripheral vascular disease) with claudication ([facility])     Chronic venous insufficiency     History of non-ST elevation myocardial infarction (NSTEMI)     Prolonged Q-T interval on ECG     History of stroke     COPD exacerbation (HCC)     Chronic respiratory failure with hypoxia and hypercapnia (HCC)     COPD (chronic obstructive pulmonary disease) (HCC)     Status post peripheral artery angioplasty     Uncontrolled hypertension     O2 dependent     Respiratory distress     Acute on chronic diastolic (congestive) heart failure (HCC)     CKD (chronic kidney disease) stage 3, GFR 30-59 ml/min (HCC)     Hypertensive emergency     Acute kidney injury superimposed on CKD ([facility]    Plan:   ) ALEC  Superimposed on CKD 3 , Cardio Renal syndrome , currently CHF compensated       Enteresto  aldactone ,  Bumex on hold , U/O 2800 cc , remains non oliguric , fair po intake      2) H/O CHF , diminished EF , NM scan results noted , for cardiac cath once renal functions       Better      3) Hyperphosphatemia better      4) Morbid obesity      5) HTN controlled      6) Type 2 DM , sugars fair       ALEC resolving Continue present care , we will follow     Veto Moralez MD

## 2022-04-19 NOTE — PROGRESS NOTES
Chief Complaint:  Chief Complaint   Patient presents with    Respiratory Distress     increased SOB the last 4 days. was 90% on trilogy oxygen concentrator at home. EMS placed on CPAP, arrived at 95%, hx of COPD      Acute on chronic diastolic (congestive) heart failure (HCC)     Subjective:    Patient is awake and alert sitting up in chair at bedside  No acute distress  Continues to complain of abdominal pain. Objective:    /74   Pulse 75   Temp 97.3 °F (36.3 °C)   Resp 17   Ht 5' 2\" (1.575 m)   Wt 210 lb 3.2 oz (95.3 kg)   LMP 01/01/1990   SpO2 100%   BMI 38.45 kg/m²     Current medications that patient is taking have been reviewed. General appearance: NAD, conversant, obese, very chronically ill appearing but comfortable/nontoxic appearing  HEENT: AT/NC, MMM  Neck: FROM, supple  Lungs: Clear to auscultation.   WOB normal.  CV: RRR, no MRGs  Abdomen: Soft, non-tender; no masses or HSM, +BS  Extremities: No peripheral edema or digital cyanosis  Skin: no rash, lesions or ulcers  Psych: Calm and cooperative  Neuro: Alert and interactive, face symmetric, moving all extremities, speech fluent    Labs:  CBC with Differential:    Lab Results   Component Value Date    WBC 9.6 04/19/2022    RBC 4.13 04/19/2022    HGB 13.0 04/19/2022    HCT 40.4 04/19/2022     04/19/2022    MCV 97.8 04/19/2022    MCH 31.5 04/19/2022    MCHC 32.2 04/19/2022    RDW 11.5 04/19/2022    NRBC 0.0 04/22/2021    SEGSPCT 84 03/03/2014    LYMPHOPCT 12.4 04/19/2022    MONOPCT 5.9 04/19/2022    BASOPCT 0.1 04/19/2022    MONOSABS 0.56 04/19/2022    LYMPHSABS 1.19 04/19/2022    EOSABS 0.08 04/19/2022    BASOSABS 0.01 04/19/2022     CMP:    Lab Results   Component Value Date     04/19/2022    K 4.2 04/19/2022    CL 96 04/19/2022    CO2 31 04/19/2022    BUN 61 04/19/2022    CREATININE 1.9 04/19/2022    GFRAA 32 04/19/2022    LABGLOM 32 04/19/2022    GLUCOSE 154 04/19/2022    GLUCOSE 181 12/01/2011    PROT 6.2 04/18/2022 LABALBU 3.5 04/18/2022    LABALBU 5.2 12/01/2011    CALCIUM 8.9 04/19/2022    BILITOT 0.5 04/18/2022    ALKPHOS 63 04/18/2022    AST 11 04/18/2022    ALT 23 04/18/2022            Assessment/Plan:  Principal Problem:    Acute on chronic diastolic (congestive) heart failure (HCC)  Active Problems:    Chronic respiratory failure with hypoxia and hypercapnia ([facility])    DAYAN and COPD overlap syndrome ([facility])    DM2 (diabetes mellitus, type 2) ([facility])    Severe obesity (BMI 35.0-35.9 with comorbidity) ([facility]    Encounter for tobacco use cessation counseling    Prolonged Q-T interval on ECG    COPD exacerbation ([facility])    Respiratory distress    CKD (chronic kidney disease) stage 3, GFR 30-59 ml/min ([facility])    Hypertensive emergency    Acute kidney injury superimposed on CKD ([facility]  Resolved Problems:    * No resolved hospital problems. *       Slightly worse ALEC, hold further bumex BUN/Creat: 74/2.4>61/1.9    Duonebs  Prednisone discontinued. Supplement O2 to keep saturation greater than 92%     Suspect this chest discomfort is pleurisy. Low suspicion for PE, age adjusted d-dimer is negative and more importantly my clinical suspicion is low.     Glucose a lot better    Continue to hold spironolactone, entresto     Stress test negative    Complaints of abdominal pain - KUB ordered - mild stool - patient had bowel movement this am.    Cardiology signed off      DVT prophylaxis: Lovenox  Requires continued inpatient level of care     PATRIZIA Ackerman CNP    12:48 PM  4/19/2022

## 2022-04-19 NOTE — PROGRESS NOTES
Date: 4/19/2022    Time: 1:17 AM    Patient Placed On BIPAP/CPAP/ Non-Invasive Ventilation? No    If no must comment. Facial area red/color change? No           If YES are Blister/Lesion present? No   If yes must notify nursing staff  BIPAP/CPAP skin barrier? Yes    Skin barrier type:mepilexlite       Comments: remains on from previous shift.          Yon Diallo RCP

## 2022-04-19 NOTE — PROGRESS NOTES
Discharge instructions printed and reviewed with patient, all questions answered. IV removed, dressing applied. Telemetry removed, cleaned and returned to nurses station. Patient in possession of all bedside belongings upon discharge from the unit.

## 2022-04-19 NOTE — PROGRESS NOTES
CLINICAL PHARMACY NOTE: MEDS TO BEDS    Total # of Prescriptions Filled: 2   The following medications were delivered to the patient:  · Nicotine 14mg patches  · Isosorbide mono ER 30 mg    Additional Documentation:  Delivered to patient @2:50

## 2022-04-19 NOTE — DISCHARGE SUMMARY
Physician Discharge Summary     Patient ID:  Susi Price  [id number]  87 y.o.  1955    Admit date: 4/11/2022    Discharge date and time:  4/19/2022     Admission Diagnoses:   Chief Complaint   Patient presents with    Respiratory Distress     increased SOB the last 4 days. was 90% on trilogy oxygen concentrator at home. EMS placed on CPAP, arrived at 95%, hx of COPD       Acute on chronic diastolic (congestive) heart failure ([facility] [address]     Discharge Diagnoses:   Principal Problem:    COPD exacerbation ([facility]  Active Problems:    Chronic respiratory failure with hypoxia and hypercapnia (HCC)    DAYAN and COPD overlap syndrome (HCC)    DM2 (diabetes mellitus, type 2) ([facility])    Severe obesity (BMI 35.0-35.9 with comorbidity) ([facility]    Encounter for tobacco use cessation counseling    Tobacco abuse    Prolonged Q-T interval on ECG    Respiratory distress    Acute on chronic diastolic (congestive) heart failure (HCC)    CKD (chronic kidney disease) stage 3, GFR 30-59 ml/min ([facility])    Hypertensive emergency    Acute kidney injury superimposed on CKD ([facility]  Resolved Problems:    * No resolved hospital problems. *       Consults: cardiology, nephrolog    Procedures:   TTE   Technically difficult examination. Severe concentric left ventricular hypertrophy. Left ventricular diastolic filling is elevated . Ejection fraction is visually estimated at 50 to 55%. Normal right ventricular size and function. Mild mitral regurgitation is present. Mild tricuspid regurgitation. Nuclear stress test  1: There is a medium size, moderate intensity scar in the inferior,   inferior lateral and lateral wall with partial reversibility.    2  The inferior and inferior lateral wall appears mildly hypokinetic   with estimated left ventricular ejection fraction of 40%   3: Low dose CT attenuation correction protocol was utilized for this   study   4: Please see separate report for EKG and hemodynamic aspect of the   stress test. 5: RISK SCAN: Intermediate risk scan         Hospital Course:   Patient presented with shortness of breath. She was noted to have COPD exacerbation with extensive wheezing and rhonchi, as well as congestive heart failure exacerbation. She has still been smoking despite severe health problems. She was treated with bronchodilators and steroids. She was also treated with diuretics. Her breathing has really cleared up a lot. She still has some pulmonary edema, however her renal function is poor and has limited our ability to diurese her better. Ultimately nephrology felt that we got her cleared up as well as we could, without pushing her into complete renal failure. We obtained an echocardiogram.  This showed severe LVH with preserved EF. Cardiology wanted to do a stress test as well. Of note she was not having any ischemic symptoms though, and her troponins were negative. Her stress test actually came back positive. However, given lack of symptoms and poor renal function, they decided cardiac catheterization would not be in her best interest and advised medical management. Due to poor renal function her Entresto and spironolactone have been stopped. Her EF is preserved anyways. As minor side issue, she has an awful lot of intermittent non-reproduced complaints. Initially was complaining of some right-sided respirophasic chest pain. I had low clinical suspicion for PE, her D-dimer came back quite low, and the complaint resolved without further work-up. She later complained of right lower quadrant abdominal pain, again further work-up ensued but nothing of note was found. Her abdominal examination was always benign. in between all of this had some other complaints like foot rash. Later she complained of left lower quadrant pain, again examination, labs, imaging did not reveal anything. She had a KUB which just showed some constipation. On day of discharge she had normal abdominal exam and labs. She didn't complain to me of any abdominal pain but apparently did complain to the NP about this. I decided to do a CT AP just to make sure we weren't missing anything. But then she refused the scan and decided she just wanted to go home.     Discharge Exam:  Vitals:    04/19/22 0737 04/19/22 0817 04/19/22 1146 04/19/22 1501   BP: (!) 162/68  128/74 126/69   Pulse: 64  75 75   Resp: 22 17    Temp: 97.9 °F (36.6 °C)  97.3 °F (36.3 °C) 97.5 °F (36.4 °C)   TempSrc: Temporal      SpO2: 96% 92% 100% 100%   Weight:       Height:            General appearance: NAD, conversant, nontoxic, chronically ill appearing  HEENT: AT/NC, MMM  Neck: FROM, supple  Lungs: Clear to auscultation, WOB normal  CV: RRR, no MRGs  Abdomen: Soft, non-tender; no masses or HSM, +BS  Extremities: No peripheral edema or digital cyanosis  Skin: no rash, lesions or ulcers  Psych: Calm and cooperative  Neuro: Alert and interactive, nonfocal     Condition:  Stable     Disposition: home    Patient Instructions:   Current Discharge Medication List      START taking these medications    Details   nicotine (NICODERM CQ) 14 MG/24HR Place 1 patch onto the skin daily  Qty: 30 patch, Refills: 3         CONTINUE these medications which have CHANGED    Details   isosorbide mononitrate (IMDUR) 30 MG extended release tablet Take 3 tablets by mouth daily  Qty: 30 tablet, Refills: 3         CONTINUE these medications which have NOT CHANGED    Details   acetaminophen (TYLENOL) 325 MG tablet Take 650 mg by mouth every 6 hours as needed for Pain      aspirin 81 MG chewable tablet Take 81 mg by mouth daily      Arformoterol Tartrate (BROVANA) 15 MCG/2ML NEBU Take 1 ampule by nebulization 2 times daily as needed      azelastine (ASTELIN) 0.1 % nasal spray 1 spray by Nasal route 2 times daily as needed for Rhinitis      cetirizine (ZYRTEC) 10 MG tablet Take 10 mg by mouth daily      DULoxetine (CYMBALTA) 60 MG extended release capsule Take 60 mg by mouth 2 times daily famotidine (PEPCID) 40 MG tablet Take 40 mg by mouth Daily with supper      fluticasone (FLONASE) 50 MCG/ACT nasal spray 2 sprays by Nasal route daily      gabapentin (NEURONTIN) 100 MG capsule Take 100 mg by mouth 2 times daily.       Insulin Degludec (TRESIBA FLEXTOUCH) 200 UNIT/ML SOPN Inject 50 Units into the skin at bedtime      insulin lispro, 1 Unit Dial, (HUMALOG KWIKPEN) 100 UNIT/ML SOPN Inject 0-6 Units into the skin 3 times daily (before meals) *Per Sliding Scale*      ipratropium-albuterol (DUONEB) 0.5-2.5 (3) MG/3ML SOLN nebulizer solution Take 1 vial by nebulization every 4 hours as needed for Shortness of Breath      montelukast (SINGULAIR) 10 MG tablet Take 10 mg by mouth nightly      mirabegron (MYRBETRIQ) 50 MG TB24 Take 50 mg by mouth at bedtime      simvastatin (ZOCOR) 20 MG tablet Take 20 mg by mouth nightly      tiotropium (SPIRIVA RESPIMAT) 1.25 MCG/ACT AERS inhaler Inhale 2 puffs into the lungs daily      traZODone (DESYREL) 100 MG tablet Take 100 mg by mouth nightly      vitamin D (CHOLECALCIFEROL) 25 MCG (1000 UT) TABS tablet Take 1,000 Units by mouth daily      bumetanide (BUMEX) 1 MG tablet Take 1 tablet by mouth daily  Qty: 30 tablet, Refills: 11      carvedilol (COREG) 25 MG tablet Take 1 tablet by mouth 2 times daily (with meals)  Qty: 180 tablet, Refills: 3      hydrALAZINE (APRESOLINE) 50 MG tablet Take 50 mg by mouth 3 times daily      pantoprazole (PROTONIX) 40 MG tablet Take 40 mg by mouth daily       metoclopramide (REGLAN) 5 MG tablet Take 5 mg by mouth 3 times daily as needed   Qty: 120 tablet, Refills: 3      docusate sodium (COLACE) 100 MG capsule Take 200 mg by mouth at bedtime          STOP taking these medications       sacubitril-valsartan (ENTRESTO) 49-51 MG per tablet Comments:   Reason for Stopping:         ENTRESTO 49-51 MG per tablet Comments:   Reason for Stopping:         spironolactone (ALDACTONE) 25 MG tablet Comments:   Reason for Stopping:         nystatin (MYCOSTATIN) 212313 UNIT/ML suspension Comments:   Reason for Stopping:         Acetaminophen (TYLENOL) 325 MG CAPS Comments:   Reason for Stopping:         Handicap Placard MISC Comments:   Reason for Stopping: BiPAP Machine MISC Comments:   Reason for Stopping:             Activity: activity as tolerated  Diet: diabetic/cardiac diet    Follow-up with PCP in 1 week.     Note that over 30 minutes was spent in preparing discharge papers, discussing discharge with patient, medication review, etc.    Signed:  Starr Underwood MD    4/19/2022  5:26 PM

## 2022-04-20 NOTE — TELEPHONE ENCOUNTER
Patient requesting appointment  Reschedule. States she has dry mouth and nose bleeds when she does blow her nose.  Please advise for scheduling

## 2022-04-22 NOTE — TELEPHONE ENCOUNTER
Called patient regarding dr recommends patient use nasal saline spray and preparation H in nose to prevent  bleeding. Patient to avoid blowing her nose as well. Patient notified of drs recommendations.

## 2022-04-26 ENCOUNTER — HOSPITAL ENCOUNTER (OUTPATIENT)
Dept: OTHER | Age: 67
Setting detail: THERAPIES SERIES
Discharge: HOME | End: 2022-04-26
Payer: MEDICARE

## 2022-04-26 VITALS
DIASTOLIC BLOOD PRESSURE: 67 MMHG | HEART RATE: 65 BPM | SYSTOLIC BLOOD PRESSURE: 148 MMHG | BODY MASS INDEX: 40.02 KG/M2 | WEIGHT: 218.8 LBS | OXYGEN SATURATION: 92 % | RESPIRATION RATE: 18 BRPM

## 2022-04-26 LAB
ANION GAP SERPL CALCULATED.3IONS-SCNC: 8 MMOL/L (ref 7–16)
BUN BLDV-MCNC: 19 MG/DL (ref 6–23)
CALCIUM SERPL-MCNC: 8.7 MG/DL (ref 8.6–10.2)
CHLORIDE BLD-SCNC: 101 MMOL/L (ref 98–107)
CO2: 28 MMOL/L (ref 22–29)
CREAT SERPL-MCNC: 1.2 MG/DL (ref 0.5–1)
GFR AFRICAN AMERICAN: 54
GFR NON-AFRICAN AMERICAN: 54 ML/MIN/1.73
GLUCOSE BLD-MCNC: 140 MG/DL (ref 74–99)
POTASSIUM SERPL-SCNC: 4.5 MMOL/L (ref 3.5–5)
PRO-BNP: 1232 PG/ML (ref 0–125)
SODIUM BLD-SCNC: 137 MMOL/L (ref 132–146)

## 2022-04-26 PROCEDURE — 36415 COLL VENOUS BLD VENIPUNCTURE: CPT

## 2022-04-26 PROCEDURE — 99204 OFFICE O/P NEW MOD 45 MIN: CPT

## 2022-04-26 PROCEDURE — 80048 BASIC METABOLIC PNL TOTAL CA: CPT

## 2022-04-26 PROCEDURE — 83880 ASSAY OF NATRIURETIC PEPTIDE: CPT

## 2022-04-26 ASSESSMENT — PAIN SCALES - GENERAL: PAINLEVEL_OUTOF10: 0

## 2022-04-26 NOTE — PLAN OF CARE
Problem: Chronic Conditions and Co-morbidities  Goal: Patient's chronic conditions and co-morbidity symptoms are monitored and maintained or improved  Outcome: Progressing   Continue plan of care

## 2022-04-26 NOTE — PROGRESS NOTES
Congestive Heart Failure [address]   1955          Referring Provider:Dr Hector Singh  Primary Care Physician: Dr Jorge Rachel  Cardiologist: Dr Hafsa Kaplan  Nephrologist: Dr Hunter        History of Present Illness:     Akira Swain is a 78 y.o. female with a history of HFpEF, most recent EF 55-60% 6-11-21. Patient Story:    She does  have dyspnea with exertion, shortness of breath, or decline in overall functional capacity. She does not have orthopnea, PND, nocturnal cough or hemoptysis. She does have abdominal distention or bloating, early satiety, anorexia/change in appetite. She does has a good urinary response to  oral diuretic. She does not have  lower extremity edema. She denies lightheadedness, dizziness. She denies palpitations, syncope or near syncope. She does not complain of chest pain, pressure, discomfort. Allergies   Allergen Reactions    Latex Hives    Bee Venom Anaphylaxis    Dilaudid [Hydromorphone Hcl] Itching    Dye [Iodides] Hives and Shortness Of Breath    Percocet [Oxycodone-Acetaminophen] Shortness Of Breath and Itching    Keflex [Cephalexin] Itching and Rash    Lasix [Furosemide] Other (See Comments)     Pt states she cramps up and gets headaches    Levaquin [Levofloxacin In D5w] Hives    Lipitor      MUSCLE SPASMS    Lyrica [Pregabalin]      Dream disturbances    Morphine Hives    Naproxen      Unsure of reaction;pt states able to take Aleve without difficulties    Nefazodone Other (See Comments)    Norvasc [Amlodipine] Swelling    Oxycodone-Acetaminophen Swelling    Shellfish-Derived Products     Trazodone And Nefazodone            Prior to Visit Medications    Medication Sig Taking?  Authorizing Provider   isosorbide mononitrate (IMDUR) 30 MG extended release tablet Take 3 tablets by mouth daily  Melissa Mora MD   nicotine (NICODERM CQ) 14 MG/24HR Place 1 patch onto the skin daily Melissa Mora MD   acetaminophen (TYLENOL) 325 MG tablet Take 650 mg by mouth every 6 hours as needed for Pain  Historical Provider, MD   aspirin 81 MG chewable tablet Take 81 mg by mouth daily  Historical Provider, MD   Arformoterol Tartrate (BROVANA) 15 MCG/2ML NEBU Take 1 ampule by nebulization 2 times daily as needed  Historical Provider, MD   azelastine (ASTELIN) 0.1 % nasal spray 1 spray by Nasal route 2 times daily as needed for Rhinitis  Historical Provider, MD   cetirizine (ZYRTEC) 10 MG tablet Take 10 mg by mouth daily  Historical Provider, MD   DULoxetine (CYMBALTA) 60 MG extended release capsule Take 60 mg by mouth 2 times daily  Historical Provider, MD   famotidine (PEPCID) 40 MG tablet Take 40 mg by mouth Daily with supper  Historical Provider, MD   fluticasone (FLONASE) 50 MCG/ACT nasal spray 2 sprays by Nasal route daily  Historical Provider, MD   gabapentin (NEURONTIN) 100 MG capsule Take 100 mg by mouth 2 times daily.   Historical Provider, MD   Insulin Degludec (TRESIBA FLEXTOUCH) 200 UNIT/ML SOPN Inject 50 Units into the skin at bedtime  Historical Provider, MD   insulin lispro, 1 Unit Dial, (HUMALOG KWIKPEN) 100 UNIT/ML SOPN Inject 0-6 Units into the skin 3 times daily (before meals) *Per Sliding Scale*  Historical Provider, MD   montelukast (SINGULAIR) 10 MG tablet Take 10 mg by mouth nightly  Historical Provider, MD   mirabegron (MYRBETRIQ) 50 MG TB24 Take 50 mg by mouth at bedtime  Historical Provider, MD   simvastatin (ZOCOR) 20 MG tablet Take 20 mg by mouth nightly  Historical Provider, MD   tiotropium (SPIRIVA RESPIMAT) 1.25 MCG/ACT AERS inhaler Inhale 2 puffs into the lungs daily  Historical Provider, MD   traZODone (DESYREL) 100 MG tablet Take 100 mg by mouth nightly  Historical Provider, MD   vitamin D (CHOLECALCIFEROL) 25 MCG (1000 UT) TABS tablet Take 1,000 Units by mouth daily  Historical Provider, MD   bumetanide (BUMEX) 1 MG tablet Take 1 tablet by mouth daily  Ericka Meckel, MD carvedilol (COREG) 25 MG tablet Take 1 tablet by mouth 2 times daily (with meals)  Jaci Freitas MD   hydrALAZINE (APRESOLINE) 50 MG tablet Take 50 mg by mouth 3 times daily  Historical Provider, MD   pantoprazole (PROTONIX) 40 MG tablet Take 40 mg by mouth daily   Historical Provider, MD   docusate sodium (COLACE) 100 MG capsule Take 200 mg by mouth at bedtime   Historical Provider, MD           Guideline directed medical:  ARNI/ACE I/ARB: n    Beta blocker yes  Aldosterone antagonist:  No        Physical Examination:     BP (!) 148/67   Pulse 65   Resp 18   Wt 218 lb 12.8 oz (99.2 kg)   LMP 01/01/1990   SpO2 92%   BMI 40.02 kg/m²     Assessment  Charting Type: Shift assessment (chf clinic)    Neurological  Level of Consciousness: Alert (0)              Respiratory  Respiratory Quality/Effort: Dyspnea with exertion  Chest Assessment: Chest expansion symmetrical    Breath Sounds  Right Upper Lobe: Clear  Right Middle Lobe: Clear  Right Lower Lobe: Clear  Left Upper Lobe: Clear  Left Lower Lobe: Clear         Cardiac  Cardiac Rhythm: Sinus rhythm    Rhythm Interpretation  Pulse: 65         Gastrointestinal  Abdominal (WDL): Exceptions to WDL  Abdomen Inspection: Distended,Soft  RUQ Bowel Sounds: Active  LUQ Bowel Sounds: Active  RLQ Bowel Sounds: Active  LLQ Bowel Sounds: Active          Bowel Sounds  RUQ Bowel Sounds: Active  LUQ Bowel Sounds: Active  RLQ Bowel Sounds: Active  LLQ Bowel Sounds:  Active                             Psychosocial  Psychosocial (WDL): Within Defined Limits    Pain Assessment  Pain Level: 0                   Pulse: 65                     LAB DATA:    Last 3 BMP      Sodium (mmol/L)   Date Value   04/19/2022 137   04/18/2022 132   04/17/2022 130 (L)     Potassium (mmol/L)   Date Value   04/18/2022 3.8   04/17/2022 4.7   04/17/2022 4.5     Potassium reflex Magnesium (mmol/L)   Date Value   04/19/2022 4.2   04/11/2022 4.9   07/20/2021 4.1     Chloride (mmol/L)   Date Value zones and to call cardiologist if in yellow zone immediately to prevent any further decline. Patient has a working scale. Patient is agreeable to future CHF clinic visits. Next 3 consecutive weekly appointments made today so that patient has a total of 4 visits within first 30 days. Interventions completed this visit:  IV diuretics given no  Lab work obtained yes, BMP/BNP   Reviewed currently prescribed medications with patient, educated on importance of compliance and answered any questions regarding their medication  Educated on signs and symptoms of HF  Educated on low sodium diet    PLAN:  Scheduled to follow up in CHF clinic on   Future Appointments   Date Time Provider Melanie Sellers   5/3/2022  1:30 PM [facility] ROOM 1 [facility]   5/5/2022  2:45 PM Mervat Uribe [facility]   5/11/2022 10:00 AM PATRIZIA Ponce - CNP [facility]   5/17/2022  1:00 PM [facility] ROOM 1 [facility]   5/25/2022  1:30 PM [facility] ROOM 1 [facility]   10/27/2022  2:00 PM Paul Major MD [facility]     Given clinic phone number and aware of signs and symptoms to call with any HF change in symptoms.

## 2022-05-03 ENCOUNTER — HOSPITAL ENCOUNTER (OUTPATIENT)
Dept: OTHER | Age: 67
Setting detail: THERAPIES SERIES
Discharge: HOME | End: 2022-05-03
Payer: MEDICARE

## 2022-05-03 VITALS
SYSTOLIC BLOOD PRESSURE: 138 MMHG | BODY MASS INDEX: 39.65 KG/M2 | HEART RATE: 83 BPM | WEIGHT: 216.8 LBS | DIASTOLIC BLOOD PRESSURE: 82 MMHG | RESPIRATION RATE: 20 BRPM | OXYGEN SATURATION: 92 %

## 2022-05-03 LAB
ANION GAP SERPL CALCULATED.3IONS-SCNC: 9 MMOL/L (ref 7–16)
BUN BLDV-MCNC: 16 MG/DL (ref 6–23)
CALCIUM SERPL-MCNC: 9.4 MG/DL (ref 8.6–10.2)
CHLORIDE BLD-SCNC: 100 MMOL/L (ref 98–107)
CO2: 27 MMOL/L (ref 22–29)
CREAT SERPL-MCNC: 1.2 MG/DL (ref 0.5–1)
GFR AFRICAN AMERICAN: 54
GFR NON-AFRICAN AMERICAN: 54 ML/MIN/1.73
GLUCOSE BLD-MCNC: 209 MG/DL (ref 74–99)
POTASSIUM SERPL-SCNC: 4.3 MMOL/L (ref 3.5–5)
PRO-BNP: 1720 PG/ML (ref 0–125)
SODIUM BLD-SCNC: 136 MMOL/L (ref 132–146)

## 2022-05-03 PROCEDURE — 80048 BASIC METABOLIC PNL TOTAL CA: CPT

## 2022-05-03 PROCEDURE — 36415 COLL VENOUS BLD VENIPUNCTURE: CPT

## 2022-05-03 PROCEDURE — 99214 OFFICE O/P EST MOD 30 MIN: CPT

## 2022-05-03 PROCEDURE — 83880 ASSAY OF NATRIURETIC PEPTIDE: CPT

## 2022-05-03 NOTE — PROGRESS NOTES
Congestive Heart Failure [id number]   1955          Referring Provider:Dr Reyes Aguilar  Primary Care Physician: Dr Shaun Castañeda  Cardiologist: Dr Tonya Tucker  Nephrologist: Dr Hunter        History of Present Illness:     Yfn Bella is a 78 y.o. female with a history of HFpEF, most recent EF 55-60% 6-11-21. Patient Story:    She does  have dyspnea with exertion, shortness of breath, or decline in overall functional capacity. She does not have orthopnea, PND, nocturnal cough or hemoptysis. She does have abdominal distention or bloating, early satiety, anorexia/change in appetite. She does have a good urinary response to oral diuretic. She does not have  lower extremity edema. She denies lightheadedness, dizziness. She denies palpitations, syncope or near syncope. She does not complain of chest pain, pressure, discomfort. Allergies   Allergen Reactions    Latex Hives    Bee Venom Anaphylaxis    Dilaudid [Hydromorphone Hcl] Itching    Dye [Iodides] Hives and Shortness Of Breath    Percocet [Oxycodone-Acetaminophen] Shortness Of Breath and Itching    Keflex [Cephalexin] Itching and Rash    Lasix [Furosemide] Other (See Comments)     Pt states she cramps up and gets headaches    Levaquin [Levofloxacin In D5w] Hives    Lipitor      MUSCLE SPASMS    Lyrica [Pregabalin]      Dream disturbances    Morphine Hives    Naproxen      Unsure of reaction;pt states able to take Aleve without difficulties    Nefazodone Other (See Comments)    Norvasc [Amlodipine] Swelling    Oxycodone-Acetaminophen Swelling    Shellfish-Derived Products     Trazodone And Nefazodone            Prior to Visit Medications    Medication Sig Taking?  Authorizing Provider   isosorbide mononitrate (IMDUR) 30 MG extended release tablet Take 3 tablets by mouth daily  Ricka Libman, MD   nicotine (NICODERM CQ) 14 MG/24HR Place 1 patch onto the skin daily Star Baker MD   acetaminophen (TYLENOL) 325 MG tablet Take 650 mg by mouth every 6 hours as needed for Pain  Historical Provider, MD   aspirin 81 MG chewable tablet Take 81 mg by mouth daily  Historical Provider, MD   Arformoterol Tartrate (BROVANA) 15 MCG/2ML NEBU Take 1 ampule by nebulization 2 times daily as needed  Historical Provider, MD   azelastine (ASTELIN) 0.1 % nasal spray 1 spray by Nasal route 2 times daily as needed for Rhinitis  Historical Provider, MD   cetirizine (ZYRTEC) 10 MG tablet Take 10 mg by mouth daily  Historical Provider, MD   DULoxetine (CYMBALTA) 60 MG extended release capsule Take 60 mg by mouth 2 times daily  Historical Provider, MD   famotidine (PEPCID) 40 MG tablet Take 40 mg by mouth Daily with supper  Historical Provider, MD   fluticasone (FLONASE) 50 MCG/ACT nasal spray 2 sprays by Nasal route daily  Historical Provider, MD   gabapentin (NEURONTIN) 100 MG capsule Take 100 mg by mouth 2 times daily.   Historical Provider, MD   Insulin Degludec (TRESIBA FLEXTOUCH) 200 UNIT/ML SOPN Inject 50 Units into the skin at bedtime  Historical Provider, MD   insulin lispro, 1 Unit Dial, (HUMALOG KWIKPEN) 100 UNIT/ML SOPN Inject 0-6 Units into the skin 3 times daily (before meals) *Per Sliding Scale*  Historical Provider, MD   montelukast (SINGULAIR) 10 MG tablet Take 10 mg by mouth nightly  Historical Provider, MD   mirabegron (MYRBETRIQ) 50 MG TB24 Take 50 mg by mouth at bedtime  Historical Provider, MD   simvastatin (ZOCOR) 20 MG tablet Take 20 mg by mouth nightly  Historical Provider, MD   tiotropium (SPIRIVA RESPIMAT) 1.25 MCG/ACT AERS inhaler Inhale 2 puffs into the lungs daily  Historical Provider, MD   traZODone (DESYREL) 100 MG tablet Take 100 mg by mouth nightly  Historical Provider, MD   vitamin D (CHOLECALCIFEROL) 25 MCG (1000 UT) TABS tablet Take 1,000 Units by mouth daily  Historical Provider, MD   bumetanide (BUMEX) 1 MG tablet Take 1 tablet by mouth daily  Jovanna Stringer MD carvedilol (COREG) 25 MG tablet Take 1 tablet by mouth 2 times daily (with meals)  Angela Keating MD   hydrALAZINE (APRESOLINE) 50 MG tablet Take 50 mg by mouth 3 times daily  Historical Provider, MD   pantoprazole (PROTONIX) 40 MG tablet Take 40 mg by mouth daily   Historical Provider, MD   docusate sodium (COLACE) 100 MG capsule Take 200 mg by mouth at bedtime   Historical Provider, MD           Guideline directed medical:  ARNI/ACE I/ARB: n  Beta blocker yes  Aldosterone antagonist:  No        Physical Examination:     /82   Pulse 83   Resp 20   Wt 216 lb 12.8 oz (98.3 kg)   LMP 01/01/1990   SpO2 92%   BMI 39.65 kg/m²     Assessment  Charting Type: Shift assessment (chf clinic)    Neurological  Level of Consciousness: Alert (0)              Respiratory  Respiratory Quality/Effort: Dyspnea with exertion  Chest Assessment: Chest expansion symmetrical  L Breath Sounds: Clear,Diminished  R Breath Sounds: Clear,Diminished    Breath Sounds  Right Upper Lobe: Clear  Right Middle Lobe: Clear  Right Lower Lobe: Diminished  Left Upper Lobe: Clear  Left Lower Lobe: Diminished         Cardiac  Cardiac Regularity: Regular  Cardiac Rhythm: Sinus rhythm    Rhythm Interpretation  Pulse: 83         Gastrointestinal  Abdominal (WDL): Exceptions to WDL  Abdomen Inspection: Distended,Soft  RUQ Bowel Sounds: Active  LUQ Bowel Sounds: Active  RLQ Bowel Sounds: Active  LLQ Bowel Sounds: Active          Bowel Sounds  RUQ Bowel Sounds: Active  LUQ Bowel Sounds: Active  RLQ Bowel Sounds: Active  LLQ Bowel Sounds:  Active    Peripheral Vascular  Peripheral Vascular (WDL): Within Defined Limits                        Psychosocial  Psychosocial (WDL): Within Defined Limits                        Pulse: 83                     LAB DATA:    Last 3 BMP      Sodium (mmol/L)   Date Value   04/26/2022 137   04/19/2022 137   04/18/2022 132     Potassium (mmol/L)   Date Value   04/26/2022 4.5   04/18/2022 3.8   04/17/2022 4.7 Potassium reflex Magnesium (mmol/L)   Date Value   04/19/2022 4.2   04/11/2022 4.9   07/20/2021 4.1     Chloride (mmol/L)   Date Value   04/26/2022 101   04/19/2022 96 (L)   04/18/2022 91 (L)     CO2 (mmol/L)   Date Value   04/26/2022 28   04/19/2022 31 (H)   04/18/2022 29     BUN (mg/dL)   Date Value   04/26/2022 19   04/19/2022 61 (H)   04/18/2022 74 (H)     Glucose (mg/dL)   Date Value   04/26/2022 140 (H)   04/19/2022 154 (H)   04/18/2022 204 (H)   12/01/2011 181 (H)   10/05/2011 133 (H)   08/21/2011 94     Calcium (mg/dL)   Date Value   04/26/2022 8.7   04/19/2022 8.9   04/18/2022 8.8       Last 3 BNP       Pro-BNP (pg/mL)   Date Value   04/26/2022 1,232 (H)   04/11/2022 2,108 (H)   07/19/2021 1,543 (H)          CBC: No results for input(s): WBC, HGB, PLT in the last 72 hours. BMP:  No results for input(s): NA, K, CL, CO2, BUN, CREATININE, GLUCOSE in the last 72 hours. Hepatic: No results for input(s): AST, ALT, ALB, BILITOT, ALKPHOS in the last 72 hours. Troponin: No results for input(s): TROPONINI in the last 72 hours. BNP: No results for input(s): BNP in the last 72 hours. Lipids: No results for input(s): CHOL, HDL in the last 72 hours. Invalid input(s): LDLCALCU  INR: No results for input(s): INR in the last 72 hours. WEIGHTS:    Wt Readings from Last 3 Encounters:   05/03/22 216 lb 12.8 oz (98.3 kg)   04/26/22 218 lb 12.8 oz (99.2 kg)   04/19/22 210 lb 3.2 oz (95.3 kg)         TELEMETRY:  Cardiac Regularity: Regular  Cardiac Rhythm/Interpretation: NSR        ASSESSMENT:  Barrett Collins is evolemic with stable weights Patient states she has great response to oral diuretic urinates approx. 12 times a day and hydrates well. Has SOB with activity recovers with rest.Patient instructed to weigh herself daily and keep record and she states she will and bring to next visit. Patient presented with Julia Holden.  Discussed with patient and Craig Flores the purpose of CHF clinic and importance of daily weights and doing a self check every day to monitor for changes. Went over the three heart failure zones and to call cardiologist if in yellow zone immediately to prevent any further decline. Patient has a working scale. Patient is agreeable to future CHF clinic visits. Next 2 consecutive weekly appointments made today so that patient has a total of 4 visits within first 30 days. Interventions completed this visit:  IV diuretics given no  Lab work obtained yes, BMP/BNP   Reviewed currently prescribed medications with patient, educated on importance of compliance and answered any questions regarding their medication  Educated on signs and symptoms of HF  Educated on low sodium diet    PLAN:  Scheduled to follow up in CHF clinic on   Future Appointments   Date Time Provider Melanie Sellers   5/5/2022  2:45  Andry [facility], [address] [facility]   5/11/2022 10:00 AM PATRIZIA Bhardwaj - CNP [facility] CARDIO [facility]   5/17/2022  1:00 PM [facility] ROOM 1 [facility]   5/25/2022  1:30 PM [facility] ROOM 1 [facility]   10/27/2022  2:00 PM Kaci Khan MD [facility]     Given clinic phone number and aware of signs and symptoms to call with any HF change in symptoms.

## 2022-05-03 NOTE — PLAN OF CARE
Problem: Chronic Conditions and Co-morbidities  Goal: Patient's chronic conditions and co-morbidity symptoms are monitored and maintained or improved  Outcome: Progressing  CHF - Continue plan of care

## 2022-05-04 ENCOUNTER — TELEPHONE (OUTPATIENT)
Dept: ENT CLINIC | Age: 67
End: 2022-05-04

## 2022-05-04 NOTE — TELEPHONE ENCOUNTER
Cancelled  Appointment on 5/5/22  For f/u  post op biopsy. Patient out of town. Please advise for rescheduling recommendations [phone].

## 2022-05-17 ENCOUNTER — HOSPITAL ENCOUNTER (OUTPATIENT)
Dept: OTHER | Age: 67
Setting detail: THERAPIES SERIES
Discharge: HOME | End: 2022-05-17
Payer: MEDICARE

## 2022-05-17 VITALS
SYSTOLIC BLOOD PRESSURE: 172 MMHG | OXYGEN SATURATION: 90 % | HEART RATE: 87 BPM | DIASTOLIC BLOOD PRESSURE: 82 MMHG | WEIGHT: 217.2 LBS | BODY MASS INDEX: 39.73 KG/M2 | RESPIRATION RATE: 18 BRPM

## 2022-05-17 LAB
ANION GAP SERPL CALCULATED.3IONS-SCNC: 9 MMOL/L (ref 7–16)
BUN BLDV-MCNC: 31 MG/DL (ref 6–23)
CALCIUM SERPL-MCNC: 9 MG/DL (ref 8.6–10.2)
CHLORIDE BLD-SCNC: 99 MMOL/L (ref 98–107)
CO2: 27 MMOL/L (ref 22–29)
CREAT SERPL-MCNC: 1.5 MG/DL (ref 0.5–1)
GFR AFRICAN AMERICAN: 42
GFR NON-AFRICAN AMERICAN: 42 ML/MIN/1.73
GLUCOSE BLD-MCNC: 231 MG/DL (ref 74–99)
POTASSIUM SERPL-SCNC: 4.5 MMOL/L (ref 3.5–5)
PRO-BNP: 502 PG/ML (ref 0–125)
SODIUM BLD-SCNC: 135 MMOL/L (ref 132–146)

## 2022-05-17 PROCEDURE — 36415 COLL VENOUS BLD VENIPUNCTURE: CPT

## 2022-05-17 PROCEDURE — 99214 OFFICE O/P EST MOD 30 MIN: CPT

## 2022-05-17 PROCEDURE — 80048 BASIC METABOLIC PNL TOTAL CA: CPT

## 2022-05-17 PROCEDURE — 83880 ASSAY OF NATRIURETIC PEPTIDE: CPT

## 2022-05-17 RX ORDER — NITROGLYCERIN 0.3 MG/1
0.3 TABLET SUBLINGUAL EVERY 5 MIN PRN
COMMUNITY

## 2022-05-17 ASSESSMENT — PAIN SCALES - GENERAL: PAINLEVEL_OUTOF10: 0

## 2022-05-17 NOTE — PROGRESS NOTES
Congestive Heart Failure [address]   1955          Referring Provider:Dr Theresa Blount  Primary Care Physician: Dr Sheree Dorantes  Cardiologist: Dr Bhakti Pete  Nephrologist: Dr Hunter        History of Present Illness:     Ramon Bobby is a 78 y.o. female with a history of HFpEF, most recent EF 55-60% 6-11-21. Patient Story:    She does  have dyspnea with exertion, shortness of breath, or decline in overall functional capacity. She does not have orthopnea, PND, nocturnal cough or hemoptysis. She does have abdominal distention or bloating, early satiety, anorexia/change in appetite. She does have a good urinary response to oral diuretic. She does not have  lower extremity edema. She denies lightheadedness, dizziness. She denies palpitations, syncope or near syncope. She does not complain of chest pain, pressure, discomfort. Allergies   Allergen Reactions    Latex Hives    Bee Venom Anaphylaxis    Dilaudid [Hydromorphone Hcl] Itching    Dye [Iodides] Hives and Shortness Of Breath    Percocet [Oxycodone-Acetaminophen] Shortness Of Breath and Itching    Keflex [Cephalexin] Itching and Rash    Lasix [Furosemide] Other (See Comments)     Pt states she cramps up and gets headaches    Levaquin [Levofloxacin In D5w] Hives    Lipitor      MUSCLE SPASMS    Lyrica [Pregabalin]      Dream disturbances    Morphine Hives    Naproxen      Unsure of reaction;pt states able to take Aleve without difficulties    Nefazodone Other (See Comments)    Norvasc [Amlodipine] Swelling    Oxycodone-Acetaminophen Swelling    Shellfish-Derived Products     Trazodone And Nefazodone            Prior to Visit Medications    Medication Sig Taking? Authorizing Provider   nitroGLYCERIN (NITROSTAT) 0.3 MG SL tablet Place 0.3 mg under the tongue every 5 minutes as needed for Chest pain up to max of 3 total doses. If no relief after 1 dose, call 911. Yes Historical Provider, MD   isosorbide mononitrate (IMDUR) 30 MG extended release tablet Take 3 tablets by mouth daily  Star Baker MD   nicotine (NICODERM CQ) 14 MG/24HR Place 1 patch onto the skin daily  Star Baker MD   acetaminophen (TYLENOL) 325 MG tablet Take 650 mg by mouth every 6 hours as needed for Pain  Historical Provider, MD   aspirin 81 MG chewable tablet Take 81 mg by mouth daily  Historical Provider, MD   Arformoterol Tartrate (BROVANA) 15 MCG/2ML NEBU Take 1 ampule by nebulization 2 times daily as needed  Historical Provider, MD   azelastine (ASTELIN) 0.1 % nasal spray 1 spray by Nasal route 2 times daily as needed for Rhinitis  Historical Provider, MD   cetirizine (ZYRTEC) 10 MG tablet Take 10 mg by mouth daily  Historical Provider, MD   DULoxetine (CYMBALTA) 60 MG extended release capsule Take 60 mg by mouth 2 times daily  Historical Provider, MD   famotidine (PEPCID) 40 MG tablet Take 40 mg by mouth Daily with supper  Historical Provider, MD   fluticasone (FLONASE) 50 MCG/ACT nasal spray 2 sprays by Nasal route daily  Historical Provider, MD   gabapentin (NEURONTIN) 100 MG capsule Take 100 mg by mouth 2 times daily.   Historical Provider, MD   Insulin Degludec (TRESIBA FLEXTOUCH) 200 UNIT/ML SOPN Inject 50 Units into the skin at bedtime  Historical Provider, MD   insulin lispro, 1 Unit Dial, (HUMALOG KWIKPEN) 100 UNIT/ML SOPN Inject 0-6 Units into the skin 3 times daily (before meals) *Per Sliding Scale*  Historical Provider, MD   montelukast (SINGULAIR) 10 MG tablet Take 10 mg by mouth nightly  Historical Provider, MD   mirabegron (MYRBETRIQ) 50 MG TB24 Take 50 mg by mouth at bedtime  Historical Provider, MD   simvastatin (ZOCOR) 20 MG tablet Take 20 mg by mouth nightly  Historical Provider, MD   tiotropium (SPIRIVA RESPIMAT) 1.25 MCG/ACT AERS inhaler Inhale 2 puffs into the lungs daily  Historical Provider, MD   traZODone (DESYREL) 100 MG tablet Take 100 mg by mouth nightly  Historical Provider, MD   vitamin D (CHOLECALCIFEROL) 25 MCG (1000 UT) TABS tablet Take 1,000 Units by mouth daily  Historical Provider, MD   bumetanide (BUMEX) 1 MG tablet Take 1 tablet by mouth daily  Yarely Leyva MD   carvedilol (COREG) 25 MG tablet Take 1 tablet by mouth 2 times daily (with meals)  Yarely Leyva MD   hydrALAZINE (APRESOLINE) 50 MG tablet Take 50 mg by mouth 3 times daily  Historical Provider, MD   pantoprazole (PROTONIX) 40 MG tablet Take 40 mg by mouth daily   Historical Provider, MD   docusate sodium (COLACE) 100 MG capsule Take 200 mg by mouth at bedtime   Historical Provider, MD           Guideline directed medical:  ARNI/ACE I/ARB: no  Beta blocker yes  Aldosterone antagonist:  No        Physical Examination:     BP (!) 172/82   Pulse 87   Resp 18   Wt 217 lb 3.2 oz (98.5 kg)   LMP 01/01/1990   SpO2 90%   BMI 39.73 kg/m²     Assessment  Charting Type: Shift assessment (chf clinic)    Neurological  Level of Consciousness: Alert (0)              Respiratory  Respiratory Quality/Effort: Dyspnea with exertion  Chest Assessment: Chest expansion symmetrical    Breath Sounds  Right Upper Lobe: Clear  Right Middle Lobe: Clear  Right Lower Lobe: Clear  Left Upper Lobe: Clear  Left Lower Lobe: Clear         Cardiac  Cardiac Rhythm: Sinus rhythm    Rhythm Interpretation  Pulse: 87         Gastrointestinal  Abdominal (WDL): Exceptions to WDL  Abdomen Inspection: Distended,Soft               Peripheral Vascular  Peripheral Vascular (WDL): Within Defined Limits  Edema: None                   Genitourinary  Genitourinary (WDL): Within Defined Limits    Psychosocial  Psychosocial (WDL): Within Defined Limits    Pain Assessment  Pain Level: 0                   Pulse: 87                     LAB DATA:    Last 3 BMP      Sodium (mmol/L)   Date Value   05/03/2022 136   04/26/2022 137   04/19/2022 137     Potassium (mmol/L)   Date Value   05/03/2022 4.3   04/26/2022 4.5   04/18/2022 3.8     Potassium reflex educated on importance of compliance and answered any questions regarding their medication  Educated on signs and symptoms of HF  Educated on low sodium diet    PLAN:  Scheduled to follow up in CHF clinic on   Future Appointments   Date Time Provider Melanie Sellers   5/20/2022 10:00 AM Jazzy Mobley, [address] [facility]   5/25/2022  1:30 PM DAVID [facility] ROOM 1 [facility]ID [facility]   6/2/2022 11:30 AM [facility]   10/27/2022  2:00 PM Sharon Cronin MD [facility]     Given clinic phone number and aware of signs and symptoms to call with any HF change in symptoms.

## 2022-05-20 ENCOUNTER — PROCEDURE VISIT (OUTPATIENT)
Dept: AUDIOLOGY | Age: 67
End: 2022-05-20
Payer: MEDICARE

## 2022-05-20 ENCOUNTER — OFFICE VISIT (OUTPATIENT)
Dept: ENT CLINIC | Age: 67
End: 2022-05-20

## 2022-05-20 VITALS
HEIGHT: 62 IN | BODY MASS INDEX: 39.56 KG/M2 | DIASTOLIC BLOOD PRESSURE: 84 MMHG | SYSTOLIC BLOOD PRESSURE: 156 MMHG | HEART RATE: 82 BPM | WEIGHT: 215 LBS

## 2022-05-20 DIAGNOSIS — H91.8X9 ASYMMETRICAL HEARING LOSS: ICD-10-CM

## 2022-05-20 DIAGNOSIS — J30.1 SEASONAL ALLERGIC RHINITIS DUE TO POLLEN: ICD-10-CM

## 2022-05-20 DIAGNOSIS — M26.622 ARTHRALGIA OF LEFT TEMPOROMANDIBULAR JOINT: ICD-10-CM

## 2022-05-20 DIAGNOSIS — H90.3 SENSORINEURAL HEARING LOSS (SNHL) OF BOTH EARS: Primary | ICD-10-CM

## 2022-05-20 DIAGNOSIS — R49.0 HOARSENESS: ICD-10-CM

## 2022-05-20 DIAGNOSIS — J38.2 VOCAL CORD NODULE: Primary | ICD-10-CM

## 2022-05-20 PROCEDURE — 92557 COMPREHENSIVE HEARING TEST: CPT | Performed by: AUDIOLOGIST

## 2022-05-20 PROCEDURE — 92567 TYMPANOMETRY: CPT | Performed by: AUDIOLOGIST

## 2022-05-20 PROCEDURE — 99024 POSTOP FOLLOW-UP VISIT: CPT | Performed by: OTOLARYNGOLOGY

## 2022-05-20 RX ORDER — PREDNISONE 50 MG/1
50 TABLET ORAL 3 TIMES DAILY
Qty: 3 TABLET | Refills: 0 | Status: SHIPPED | OUTPATIENT
Start: 2022-05-20 | End: 2022-05-21

## 2022-05-20 RX ORDER — DIPHENHYDRAMINE HCL 50 MG
50 CAPSULE ORAL DAILY
Qty: 1 CAPSULE | Refills: 0 | Status: SHIPPED | OUTPATIENT
Start: 2022-05-20 | End: 2022-05-21

## 2022-05-20 RX ORDER — CETIRIZINE HYDROCHLORIDE 10 MG/1
10 TABLET ORAL DAILY
Qty: 30 TABLET | Refills: 5 | Status: SHIPPED | OUTPATIENT
Start: 2022-05-20

## 2022-05-20 ASSESSMENT — ENCOUNTER SYMPTOMS
ALLERGIC/IMMUNOLOGIC NEGATIVE: 1
VOICE CHANGE: 1
EYES NEGATIVE: 1
RESPIRATORY NEGATIVE: 1

## 2022-05-20 NOTE — PROGRESS NOTES
This patient was referred for audiometric/tympanometric testing by Dr. Jason Burgos due to hearing loss since January. .     Audiometry using pure tone air and bone conduction testing (using insert and headphones) revealed a moderately severe upward sloping to moderate  sensorineural hearing loss, bilaterally, worse in the right ear. Reliability was good. Speech reception thresholds were in good agreement with the pure tone averages, bilaterally. Speech discrimination scores were good, in the left ear but poor in the right ear using monitored live voice. Tympanometry revealed normal middle ear peak pressure and compliance, bilaterally. The results were reviewed with the patient. Recommendations for follow up will be made pending physician consult.     Clarisse Alvarez

## 2022-05-20 NOTE — PROGRESS NOTES
5. Asymmetrical hearing loss  MRI IAC POSTERIOR FOSSA W WO CONTRAST              Plan:     Vocal cord nodule, Reinkes Edema  -biopsy reviewed, ishmael laryngeal nodule  -will refer to speech therapy   -scope at next visit    Asymmetric hearing loss  -MRI IAC  -hearing aids  Follow up in 2 week(s)                      Megan Pride  1955    I have discussed the case, including pertinent history and exam findings with the resident. I have seen and examined the patient and the key elements of the encounter have been performed by me. I agree with the assessment, plan and orders as documented by the  resident              Remainder of medical problems as per  resident note. Patient seen and examined. Agree with above exam, assessment and plan.       Electronically signed by Vadim Rivas DO on 5/20/22 at 1:23 PM EDT

## 2022-05-25 ENCOUNTER — HOSPITAL ENCOUNTER (OUTPATIENT)
Dept: OTHER | Age: 67
Setting detail: THERAPIES SERIES
Discharge: HOME | End: 2022-05-25
Payer: MEDICARE

## 2022-05-25 VITALS
RESPIRATION RATE: 18 BRPM | DIASTOLIC BLOOD PRESSURE: 88 MMHG | SYSTOLIC BLOOD PRESSURE: 172 MMHG | WEIGHT: 215.2 LBS | HEART RATE: 78 BPM | OXYGEN SATURATION: 91 % | BODY MASS INDEX: 40 KG/M2

## 2022-05-25 LAB
ANION GAP SERPL CALCULATED.3IONS-SCNC: 10 MMOL/L (ref 7–16)
BUN BLDV-MCNC: 21 MG/DL (ref 6–23)
CALCIUM SERPL-MCNC: 9.4 MG/DL (ref 8.6–10.2)
CHLORIDE BLD-SCNC: 99 MMOL/L (ref 98–107)
CO2: 28 MMOL/L (ref 22–29)
CREAT SERPL-MCNC: 1.3 MG/DL (ref 0.5–1)
GFR AFRICAN AMERICAN: 49
GFR NON-AFRICAN AMERICAN: 49 ML/MIN/1.73
GLUCOSE BLD-MCNC: 204 MG/DL (ref 74–99)
POTASSIUM SERPL-SCNC: 4.4 MMOL/L (ref 3.5–5)
PRO-BNP: 806 PG/ML (ref 0–125)
SODIUM BLD-SCNC: 137 MMOL/L (ref 132–146)

## 2022-05-25 PROCEDURE — 36415 COLL VENOUS BLD VENIPUNCTURE: CPT

## 2022-05-25 PROCEDURE — 2500000003 HC RX 250 WO HCPCS

## 2022-05-25 PROCEDURE — 96374 THER/PROPH/DIAG INJ IV PUSH: CPT

## 2022-05-25 PROCEDURE — 83880 ASSAY OF NATRIURETIC PEPTIDE: CPT

## 2022-05-25 PROCEDURE — 80048 BASIC METABOLIC PNL TOTAL CA: CPT

## 2022-05-25 PROCEDURE — 99214 OFFICE O/P EST MOD 30 MIN: CPT

## 2022-05-25 PROCEDURE — 2580000003 HC RX 258

## 2022-05-25 RX ORDER — SODIUM CHLORIDE 0.9 % (FLUSH) 0.9 %
10 SYRINGE (ML) INJECTION ONCE
Status: COMPLETED | OUTPATIENT
Start: 2022-05-25 | End: 2022-05-25

## 2022-05-25 RX ORDER — SODIUM CHLORIDE 0.9 % (FLUSH) 0.9 %
SYRINGE (ML) INJECTION
Status: COMPLETED
Start: 2022-05-25 | End: 2022-05-25

## 2022-05-25 RX ORDER — BUMETANIDE 0.25 MG/ML
INJECTION, SOLUTION INTRAMUSCULAR; INTRAVENOUS
Status: COMPLETED
Start: 2022-05-25 | End: 2022-05-25

## 2022-05-25 RX ORDER — BUMETANIDE 0.25 MG/ML
2 INJECTION, SOLUTION INTRAMUSCULAR; INTRAVENOUS ONCE
Status: COMPLETED | OUTPATIENT
Start: 2022-05-25 | End: 2022-05-25

## 2022-05-25 RX ADMIN — BUMETANIDE 2 MG: 0.25 INJECTION, SOLUTION INTRAMUSCULAR; INTRAVENOUS at 13:42

## 2022-05-25 RX ADMIN — Medication 10 ML: at 13:41

## 2022-05-25 RX ADMIN — SODIUM CHLORIDE, PRESERVATIVE FREE 10 ML: 5 INJECTION INTRAVENOUS at 13:41

## 2022-05-25 NOTE — PROGRESS NOTES
Congestive Heart Failure [id number]   1955          Referring Provider:Dr Jonas Vazquez  Primary Care Physician: Dr Manpreet Sánchez  Cardiologist: Dr Gracy Aleman  Nephrologist: Dr Hunter        History of Present Illness:     Devora Cheung is a 78 y.o. female with a history of HFpEF, most recent EF 55-60% 6-11-21. Patient Story:    She does  have dyspnea with exertion, shortness of breath, or decline in overall functional capacity. She does not have orthopnea, PND, nocturnal cough or hemoptysis. She does have abdominal distention or bloating, early satiety, anorexia/change in appetite. She does have a good urinary response to oral diuretic. She does not have  lower extremity edema. She denies lightheadedness, dizziness. She denies palpitations, syncope or near syncope. She does not complain of chest pain, pressure, discomfort. Allergies   Allergen Reactions    Latex Hives    Bee Venom Anaphylaxis    Dilaudid [Hydromorphone Hcl] Itching    Dye [Iodides] Hives and Shortness Of Breath    Percocet [Oxycodone-Acetaminophen] Shortness Of Breath and Itching    Keflex [Cephalexin] Itching and Rash    Lasix [Furosemide] Other (See Comments)     Pt states she cramps up and gets headaches    Levaquin [Levofloxacin In D5w] Hives    Lipitor      MUSCLE SPASMS    Lyrica [Pregabalin]      Dream disturbances    Morphine Hives    Naproxen      Unsure of reaction;pt states able to take Aleve without difficulties    Nefazodone Other (See Comments)    Norvasc [Amlodipine] Swelling    Oxycodone-Acetaminophen Swelling    Shellfish-Derived Products     Trazodone And Nefazodone            Prior to Visit Medications    Medication Sig Taking?  Authorizing Provider   cetirizine (ZYRTEC) 10 MG tablet Take 1 tablet by mouth daily  Maria De Jesus Diaz DO   nitroGLYCERIN (NITROSTAT) 0.3 MG SL tablet Place 0.3 mg under the tongue every 5 minutes as needed for Chest pain up to max of 3 total doses. If no relief after 1 dose, call 911. Historical Provider, MD   isosorbide mononitrate (IMDUR) 30 MG extended release tablet Take 3 tablets by mouth daily  Grant Escobedo MD   nicotine (NICODERM CQ) 14 MG/24HR Place 1 patch onto the skin daily  Grant Escobedo MD   acetaminophen (TYLENOL) 325 MG tablet Take 650 mg by mouth every 6 hours as needed for Pain  Historical Provider, MD   aspirin 81 MG chewable tablet Take 81 mg by mouth daily  Historical Provider, MD   Arformoterol Tartrate (BROVANA) 15 MCG/2ML NEBU Take 1 ampule by nebulization 2 times daily as needed  Historical Provider, MD   azelastine (ASTELIN) 0.1 % nasal spray 1 spray by Nasal route 2 times daily as needed for Rhinitis  Historical Provider, MD   DULoxetine (CYMBALTA) 60 MG extended release capsule Take 60 mg by mouth 2 times daily  Historical Provider, MD   famotidine (PEPCID) 40 MG tablet Take 40 mg by mouth Daily with supper  Historical Provider, MD   fluticasone (FLONASE) 50 MCG/ACT nasal spray 2 sprays by Nasal route daily  Historical Provider, MD   gabapentin (NEURONTIN) 100 MG capsule Take 100 mg by mouth 2 times daily.   Historical Provider, MD   Insulin Degludec (TRESIBA FLEXTOUCH) 200 UNIT/ML SOPN Inject 50 Units into the skin at bedtime  Historical Provider, MD   insulin lispro, 1 Unit Dial, (HUMALOG KWIKPEN) 100 UNIT/ML SOPN Inject 0-6 Units into the skin 3 times daily (before meals) *Per Sliding Scale*  Historical Provider, MD   montelukast (SINGULAIR) 10 MG tablet Take 10 mg by mouth nightly  Historical Provider, MD   mirabegron (MYRBETRIQ) 50 MG TB24 Take 50 mg by mouth at bedtime  Historical Provider, MD   simvastatin (ZOCOR) 20 MG tablet Take 20 mg by mouth nightly  Historical Provider, MD   tiotropium (SPIRIVA RESPIMAT) 1.25 MCG/ACT AERS inhaler Inhale 2 puffs into the lungs daily  Historical Provider, MD   traZODone (DESYREL) 100 MG tablet Take 100 mg by mouth nightly Historical Provider, MD   vitamin D (CHOLECALCIFEROL) 25 MCG (1000 UT) TABS tablet Take 1,000 Units by mouth daily  Historical Provider, MD   bumetanide (BUMEX) 1 MG tablet Take 1 tablet by mouth daily  Payam Sanchez MD   carvedilol (COREG) 25 MG tablet Take 1 tablet by mouth 2 times daily (with meals)  Payam Sanchez MD   hydrALAZINE (APRESOLINE) 50 MG tablet Take 50 mg by mouth 3 times daily  Historical Provider, MD   pantoprazole (PROTONIX) 40 MG tablet Take 40 mg by mouth daily   Historical Provider, MD   docusate sodium (COLACE) 100 MG capsule Take 200 mg by mouth at bedtime   Historical Provider, MD           Guideline directed medical:  ARNI/ACE I/ARB: no  Beta blocker yes  Aldosterone antagonist:  No        Physical Examination:     BP (!) 172/88 Comment: manual  Pulse 78   Resp 18   Wt 215 lb 3.2 oz (97.6 kg)   LMP 01/01/1990   SpO2 91%   BMI 40.00 kg/m²     Assessment  Charting Type: Shift assessment (chf clinic)    Neurological  Level of Consciousness: Alert (0)              Respiratory  Respiratory Quality/Effort: Dyspnea with exertion  Chest Assessment: Chest expansion symmetrical  L Breath Sounds: Clear,Fine Crackles  R Breath Sounds: Clear,Fine Crackles    Breath Sounds  Right Upper Lobe: Clear  Right Middle Lobe: Fine Crackles  Right Lower Lobe: Fine Crackles  Left Upper Lobe: Clear  Left Lower Lobe: Fine Crackles         Cardiac  Cardiac Rhythm: Sinus rhythm    Rhythm Interpretation  Heart Rate: 78         Gastrointestinal  Abdominal (WDL): Within Defined Limits  Abdomen Inspection: Soft  RUQ Bowel Sounds: Active  LUQ Bowel Sounds: Active  RLQ Bowel Sounds: Active  LLQ Bowel Sounds: Active          Bowel Sounds  RUQ Bowel Sounds: Active  LUQ Bowel Sounds: Active  RLQ Bowel Sounds: Active  LLQ Bowel Sounds:  Active    Peripheral Vascular  Peripheral Vascular (WDL): Within Defined Limits  Edema: None                   Genitourinary  Genitourinary (WDL): Within Defined Limits    Psychosocial  Psychosocial (WDL): Within Defined Limits                        Heart Rate: 78                     LAB DATA:    Last 3 BMP      Sodium (mmol/L)   Date Value   05/25/2022 137   05/17/2022 135   05/03/2022 136     Potassium (mmol/L)   Date Value   05/25/2022 4.4   05/17/2022 4.5   05/03/2022 4.3     Potassium reflex Magnesium (mmol/L)   Date Value   04/19/2022 4.2   04/11/2022 4.9   07/20/2021 4.1     Chloride (mmol/L)   Date Value   05/25/2022 99   05/17/2022 99   05/03/2022 100     CO2 (mmol/L)   Date Value   05/25/2022 28   05/17/2022 27   05/03/2022 27     BUN (mg/dL)   Date Value   05/25/2022 21   05/17/2022 31 (H)   05/03/2022 16     Glucose (mg/dL)   Date Value   05/25/2022 204 (H)   05/17/2022 231 (H)   05/03/2022 209 (H)   12/01/2011 181 (H)   10/05/2011 133 (H)   08/21/2011 94     Calcium (mg/dL)   Date Value   05/25/2022 9.4   05/17/2022 9.0   05/03/2022 9.4       Last 3 BNP       Pro-BNP (pg/mL)   Date Value   05/25/2022 806 (H)   05/17/2022 502 (H)   05/03/2022 1,720 (H)          CBC: No results for input(s): WBC, HGB, PLT in the last 72 hours. BMP:    Recent Labs     05/25/22  1330      K 4.4   CL 99   CO2 28   BUN 21   CREATININE 1.3*   GLUCOSE 204*     Hepatic: No results for input(s): AST, ALT, ALB, BILITOT, ALKPHOS in the last 72 hours. Troponin: No results for input(s): TROPONINI in the last 72 hours. BNP: No results for input(s): BNP in the last 72 hours. Lipids: No results for input(s): CHOL, HDL in the last 72 hours. Invalid input(s): LDLCALCU  INR: No results for input(s): INR in the last 72 hours.         WEIGHTS:    Wt Readings from Last 3 Encounters:   05/25/22 215 lb 3.2 oz (97.6 kg)   05/20/22 215 lb (97.5 kg)   05/17/22 217 lb 3.2 oz (98.5 kg)         TELEMETRY:  Cardiac Regularity: Regular  Cardiac Rhythm/Interpretation: NSR        ASSESSMENT:  Megan Pride is hypervolemic with stable weights but increased shortness of breath and fine crackles herd bilaterally. She admits to loving cheese and will try to minimize her cheese consumption. She was encouraged to follow a 2000 mg sodium diet and stay hydrated with 64 ounces of fluid daily. Interventions completed this visit:  IV diuretics given-yes, Bumex 2mg IVP  Lab work obtained yes, BMP/BNP   Reviewed currently prescribed medications with patient, educated on importance of compliance and answered any questions regarding their medication  Educated on signs and symptoms of HF  Educated on low sodium diet    PLAN:  Scheduled to follow up in CHF clinic on   Future Appointments   Date Time Provider Melanie Sellers   6/2/2022 11:30 AM PATRIZIA Warren CNP CARDIO [facility]   6/10/2022  1:00 PM [facility] CHF ROOM 1 [facility] Joe HOD   6/14/2022  9:30 AM SEB MRI-B [facility] MRI SEB Radiolog   7/27/2022 10:30 AM DO Karin Palacio ENT [facility]   10/27/2022  2:00 PM Amari Kumar MD [facility]     Given clinic phone number [phone] and aware of signs and symptoms to call with any HF change in symptoms.

## 2022-06-02 ENCOUNTER — OFFICE VISIT (OUTPATIENT)
Dept: CARDIOLOGY CLINIC | Age: 67
End: 2022-06-02
Payer: MEDICARE

## 2022-06-02 VITALS
HEART RATE: 77 BPM | RESPIRATION RATE: 26 BRPM | DIASTOLIC BLOOD PRESSURE: 60 MMHG | BODY MASS INDEX: 39.75 KG/M2 | OXYGEN SATURATION: 92 % | WEIGHT: 216 LBS | HEIGHT: 62 IN | SYSTOLIC BLOOD PRESSURE: 136 MMHG

## 2022-06-02 DIAGNOSIS — I50.42 CHRONIC COMBINED SYSTOLIC AND DIASTOLIC HEART FAILURE (HCC): Primary | ICD-10-CM

## 2022-06-02 PROCEDURE — 1090F PRES/ABSN URINE INCON ASSESS: CPT | Performed by: NURSE PRACTITIONER

## 2022-06-02 PROCEDURE — G8399 PT W/DXA RESULTS DOCUMENT: HCPCS | Performed by: NURSE PRACTITIONER

## 2022-06-02 PROCEDURE — G8417 CALC BMI ABV UP PARAM F/U: HCPCS | Performed by: NURSE PRACTITIONER

## 2022-06-02 PROCEDURE — 93000 ELECTROCARDIOGRAM COMPLETE: CPT | Performed by: INTERNAL MEDICINE

## 2022-06-02 PROCEDURE — 99214 OFFICE O/P EST MOD 30 MIN: CPT | Performed by: NURSE PRACTITIONER

## 2022-06-02 PROCEDURE — 1123F ACP DISCUSS/DSCN MKR DOCD: CPT | Performed by: NURSE PRACTITIONER

## 2022-06-02 PROCEDURE — G8427 DOCREV CUR MEDS BY ELIG CLIN: HCPCS | Performed by: NURSE PRACTITIONER

## 2022-06-02 PROCEDURE — 4004F PT TOBACCO SCREEN RCVD TLK: CPT | Performed by: NURSE PRACTITIONER

## 2022-06-02 PROCEDURE — 3017F COLORECTAL CA SCREEN DOC REV: CPT | Performed by: NURSE PRACTITIONER

## 2022-06-02 NOTE — PROGRESS NOTES
[facility] Cardiology  Office Visit         Reason for Visit: Heart Failure    Primary Cardiologist: Dr. Ros Cason         History of Present Illness:     Ms. Fito Boudreaux is a 78year old female with a PMHx of HFimpEF, CAD with history of CABG (2011) x 2, Obesity, HTN, HLD, T2DM, Tobacco and marijuana use, PAD with history of left popiteal and tibial arthrectomy/angioplasty, Chronic RBBB, Hx of COVID-19 pneumonia and COPD. She recently presented to the emergency room with complaints of SAUNDERS and cough with thick sputum production. Upon arrival /169, CXR consistent with pulmonary vascular congestion. She was initiated on IV bumetanide and admitted to the hospital for acute hypoxic respiratory failure in the setting of acute heart failure and COPD exacerbation. During hospitalization she developed ALEC and nephrotoxic agents were discontinued (Entresto and Aldactone). TTE during hospitalization demonstrated LVEF 50-55% preserved RV size and function, mild MR, mild TR. Once subjectively improved she underwent NM stress that revealed a medium sized, moderate intensity inferior lateral and lateral wall defect with partial reversibility. Given ALEC/CKD she was treated medically, GDMT was adjusted with initiation of Imdur, hydralazine and she was maintained on carvedilol with plans to reintroduce Valerie Tariq as outpatient when renal function allowed. She presents today in hospital follow-up, since discharge from the hospital she has been following weekly with the CHF infusion clinic. She received a dose of IV diuretic last week for increased weight and shortness of breath. She reports excellent urinary response with clear yellow urine production. She is somewhat frustrated with the amount of time that she has to urinate throughout the day given diuretic use. She also has a history of incontinence and previously followed with urology.   She has chronic shortness of breath/SAUNDERS, compliant with supplemental O2 use and nightly trilogy use. She denies any recent chest pain, pressure, heaviness, palpitations, dizziness, lightheadedness, near-syncope, syncope or strokelike symptoms.       Patient Active Problem List    Diagnosis Date Noted    Chronic respiratory failure with hypoxia and hypercapnia ([facility] 04/10/2019     Priority: High    Coronary artery disease involving native coronary artery of native heart without angina pectoris 01/09/2017     Priority: High    Chronic combined systolic and diastolic heart failure ([address] 10/04/2013     Priority: High     Cath Jan 2013-   EF 30%, global LV hypokinesis  plaque in distal left main, 50% LAD, circumflex  Mean pulmonary pressure 37      Vocal cord mass      Priority: Medium    DM2 (diabetes mellitus, type 2) ([facility] 01/09/2017     Priority: Medium    DAYAN and COPD overlap syndrome ([facility] 09/13/2013     Priority: Medium     On bipap 22/18, f/u Dr Yue Middleton Cigarette smoker 01/09/2017     Priority: Low    Marijuana use, smoked 01/09/2017     Priority: Low    Severe obesity (BMI 35.0-35.9 with comorbidity) ([facility] 09/13/2013     Priority: Low    CKD (chronic kidney disease) stage 3, GFR 30-59 ml/min ([facility] 04/12/2022    Hypertensive emergency 04/12/2022    Acute kidney injury superimposed on CKD ([address] 04/12/2022    Respiratory distress 04/11/2022    Acute on chronic diastolic (congestive) heart failure ([address] 04/11/2022    O2 dependent 09/16/2021    Uncontrolled hypertension 03/12/2021    Status post peripheral artery angioplasty 10/31/2019    COPD (chronic obstructive pulmonary disease) ([address] 09/19/2019    COPD exacerbation ([facility] 04/10/2019    History of stroke 04/03/2019     Remote, rt. cerebellum, head CT scan, 1/9/19      History of non-ST elevation myocardial infarction (NSTEMI)     Prolonged Q-T interval on ECG     Tobacco abuse 10/11/2017    Chronic venous insufficiency 10/11/2017    PVD (peripheral vascular disease) with claudication ([address] 08/30/2017    Encounter for tobacco use cessation counseling 07/05/2017    Ischemic cardiomyopathy 03/09/2017    Hiatal hernia 12/29/2016     Per EGD 12/23/16 Dr Pfeiffer Mis Melanosis coli 12/23/2016     Per C-scope 12/23/2016 Dr Rinku Flower      Non-compliance 06/09/2016    Lumbar stenosis 05/10/2016    Spondylosis 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 05/10/2016    Lumbar disc herniation 05/10/2016    Asymmetric septal hypertrophy ([address] 04/15/2016     Per echo 4/15/16      Elevated CA 19-9 level 02/25/2016    Glaucoma, open angle 02/01/2016     Mild-OU      Chronic back pain - d/t muscle spasm      was d/c from PM for positive THC in UDS      Mixed incontinence urge and stress     Chronic passive hepatic congestion 11/12/2015    Major depressive disorder, recurrent episode, mild ([address] 10/30/2015    Diabetic polyneuropathy associated with type 2 diabetes mellitus ([address] 10/30/2015    GERD (gastroesophageal reflux disease) 08/12/2015    Vitamin D insufficiency 09/23/2013    Hyperlipidemia 09/13/2013           Past Medical History:   Diagnosis Date    Asthma     Atherosclerosis of native artery of left leg with rest pain ([address] 11/09/2018    CAD (coronary artery disease) 2011    Cellulitis and abscess of trunk 04/14/2015    Cerebellar infarct ([address] 04/03/2019    Remote, rt. cerebellum, head CT scan, 1/9/19    Chronic back pain     Chronic kidney disease     Chronic systolic congestive heart failure ([address] 10/04/2013    Chronic venous insufficiency 10/11/2017    COPD (chronic obstructive pulmonary disease) ([address]     COVID-19     Depression     Diabetes mellitus ([address]     Diabetic neuropathy ([address]     Diverticulosis     Fatty liver 01/08/2016    per US    Glaucoma, open angle 02/01/2016    Mild-OU    Hepatic encephalopathy ([address] 02/07/2016    resolved    Hiatal hernia     History of blood transfusion     Hyperlipidemia     Hyperplastic colon polyp     Hypertension     Incontinence     Liver mass     Morbid obesity ([address]     Movement disorder     Myocardial infarction ([address] 2011    O2 dependent 09/16/2021    with bipap 3 liters    Osteoarthritis, generalized     Pain in both lower legs 10/11/2017    Peripheral vascular disease ([address]     Pneumonia 01/26/2014    Pulmonary edema     resolved    PVD (peripheral vascular disease) with claudication ([address] 08/30/2017    Sleep apnea     uses BI PAP    Status post peripheral artery angioplasty 10/31/2019    Tobacco abuse     Tobacco abuse 10/11/2017    Tubular adenoma polyp of rectum     Urinary tract infection due to ESBL Klebsiella 03/08/2017    Ventral hernia            Past Surgical History:   Procedure Laterality Date    ANGIOPLASTY  11/13/2018    Dr. Vegas Criglnatalio & athrectomy L SFA & Popliteal    BREAST SURGERY  2000    bilateral reduction    BRONCHIAL BRUSH BIOPSY  1/25/2013    Dr Regina Gonzalez  04/20/2015    Dr. Karin Major  12/2011     DR. Roger Segura,  follows Dr Yuli Mortensen  11/15/2013    Dr Miya Turner COLONOSCOPY  12/23/2016    Dr Dupont Client adenoma & hyperplastic polyps, melanosis coli (repeat one year 12/2017)    CORONARY ARTERY BYPASS GRAFT      ECHO COMPLETE  10/9/2013         ENDOSCOPY, COLON, DIAGNOSTIC  04/18/2017    GALLBLADDER SURGERY      gallstones removed, [facility] 8/2017    HYSTERECTOMY      JOINT REPLACEMENT  2011    LEFT KNEE    KNEE SURGERY      left knee replacement    LARYNGOSCOPY N/A 4/4/2022    DIRECT LARYNGOSCOPY WITH BIOPSY performed by Michelle Gomez DO at [address] Left [id number]    LIVER BIOPSY  1/8/2016    [facility]    POLYSOMNOGRAPHY  1/2016    [facility]    UPPER GASTROINTESTINAL ENDOSCOPY  12/20/12    UPPER GASTROINTESTINAL ENDOSCOPY  12/23/2016    Dr Miya Turner UPPER GASTROINTESTINAL ENDOSCOPY  02/08/2017             Allergies   Allergen Reactions    Latex Hives    Bee Venom Anaphylaxis    Dilaudid [Hydromorphone Hcl] Itching    Dye [Iodides] Hives and Shortness Of Breath    Percocet [Oxycodone-Acetaminophen] Shortness Of Breath and Itching    Keflex [Cephalexin] Itching and Rash    Lasix [Furosemide] Other (See Comments)     Pt states she cramps up and gets headaches    Levaquin [Levofloxacin In D5w] Hives    Lipitor      MUSCLE SPASMS    Lyrica [Pregabalin]      Dream disturbances    Morphine Hives    Naproxen      Unsure of reaction;pt states able to take Aleve without difficulties    Nefazodone Other (See Comments)    Norvasc [Amlodipine] Swelling    Oxycodone-Acetaminophen Swelling    Shellfish-Derived Products     Trazodone And Nefazodone          Outpatient Medications Marked as Taking for the 6/2/22 encounter (Office Visit) with PATRIZIA Diaz - [facility]   Medication Sig Dispense Refill    sacubitril-valsartan (ENTRESTO) 24-26 MG per tablet Take 1 tablet by mouth 2 times daily 60 tablet 3    cetirizine (ZYRTEC) 10 MG tablet Take 1 tablet by mouth daily 30 tablet 5    nitroGLYCERIN (NITROSTAT) 0.3 MG SL tablet Place 0.3 mg under the tongue every 5 minutes as needed for Chest pain up to max of 3 total doses. If no relief after 1 dose, call 911.       isosorbide mononitrate (IMDUR) 30 MG extended release tablet Take 3 tablets by mouth daily 30 tablet 3    nicotine (NICODERM CQ) 14 MG/24HR Place 1 patch onto the skin daily 30 patch 3    acetaminophen (TYLENOL) 325 MG tablet Take 650 mg by mouth every 6 hours as needed for Pain      aspirin 81 MG chewable tablet Take 81 mg by mouth daily      Arformoterol Tartrate (BROVANA) 15 MCG/2ML NEBU Take 1 ampule by nebulization 2 times daily as needed      azelastine (ASTELIN) 0.1 % nasal spray 1 spray by Nasal route 2 times daily as needed for Rhinitis      DULoxetine (CYMBALTA) 60 MG extended release capsule Take 60 mg by mouth 2 times daily      famotidine (PEPCID) 40 MG tablet Take 40 mg by mouth Daily with supper      fluticasone (FLONASE) 50 MCG/ACT nasal spray 2 sprays by Nasal route daily      gabapentin (NEURONTIN) 100 MG capsule Take 100 mg by mouth 2 times daily.  Insulin Degludec (TRESIBA FLEXTOUCH) 200 UNIT/ML SOPN Inject 50 Units into the skin at bedtime      insulin lispro, 1 Unit Dial, (HUMALOG KWIKPEN) 100 UNIT/ML SOPN Inject 0-6 Units into the skin 3 times daily (before meals) *Per Sliding Scale*      montelukast (SINGULAIR) 10 MG tablet Take 10 mg by mouth nightly      mirabegron (MYRBETRIQ) 50 MG TB24 Take 50 mg by mouth at bedtime      simvastatin (ZOCOR) 20 MG tablet Take 20 mg by mouth nightly      tiotropium (SPIRIVA RESPIMAT) 1.25 MCG/ACT AERS inhaler Inhale 2 puffs into the lungs daily      traZODone (DESYREL) 100 MG tablet Take 100 mg by mouth nightly      vitamin D (CHOLECALCIFEROL) 25 MCG (1000 UT) TABS tablet Take 1,000 Units by mouth daily      bumetanide (BUMEX) 1 MG tablet Take 1 tablet by mouth daily 30 tablet 11    carvedilol (COREG) 25 MG tablet Take 1 tablet by mouth 2 times daily (with meals) 180 tablet 3    hydrALAZINE (APRESOLINE) 50 MG tablet Take 50 mg by mouth 3 times daily      pantoprazole (PROTONIX) 40 MG tablet Take 40 mg by mouth daily       docusate sodium (COLACE) 100 MG capsule Take 200 mg by mouth at bedtime            Review of Systems:   Cardiac: As per HPI  General: No fever, chills, rigors  Pulmonary: As per HPI  HEENT: No visual disturbances, difficult swallowing  GI: No nausea, vomiting, abdominal pain  : No dysuria or hematuria  Endocrine: No thyroid disease or diabetes  Musculoskeletal: PEDROZA x 4, no focal motor deficits  Skin: Intact, no rashes  Neuro/Psych: No headache or seizures          Weights:   Wt Readings from Last 3 Encounters:   06/02/22 216 lb (98 kg)   05/25/22 215 lb 3.2 oz (97.6 kg)   05/20/22 215 lb (97.5 kg)         Physical Examination:     /60   Pulse 77   Resp 26   Ht 5' 1.5\" (1.562 m)   Wt 216 lb (98 kg)   LMP 01/01/1990   SpO2 92%   BMI 40.15 kg/m²     CONSTITUTIONAL: Alert and oriented times 3, no acute distress and cooperative to examination with proper mood and affect. SKIN: Skin color, texture, turgor normal. No rashes or lesions. LYMPH: no cervical nodes, no inguinal nodes  HEENT: Head is normocephalic, atraumatic. EOMI, PERRLA. NECK: Supple, symmetrical, trachea midline, no adenopathy, thyroid symmetric, not enlarged and no tenderness, skin normal.  CHEST/LUNGS: chest symmetric with normal A/P diameter, normal respiratory rate and rhythm, lungs clear to auscultation without wheezes, rales or rhonchi. No accessory muscle use. Scars None   CARDIOVASCULAR: Heart sounds are normal.  Regular rate and rhythm without murmur, gallop or rub. Normal S1 and S2. . Carotid and femoral pulses 2+/4 and equal bilaterally. ABDOMEN: Normal shape. No and Laparoscopic scar(s) present. Normal bowel sounds. No bruits. soft, nondistended, no masses or organomegaly. no evidence of hernia. Percussion: Normal without hepatosplenomegally. Tenderness: absent. RECTAL: deferred, not clinically indicated  NEUROLOGIC: There are no focalizing motor or sensory deficits. CN II-XII are grossly intact. Star Confer EXTREMITIES: no cyanosis, no clubbing and no edema. All the following diagnostics were personally reviewed and interpreted by me.        LAB DATA:     4/18/2022 04:12 4/19/2022 05:49 4/26/2022 13:00 5/3/2022 14:05 5/17/2022 13:20 5/25/2022 13:30   Sodium 132 137 137 136 135 137   Potassium 3.8 4.2 4.5 4.3 4.5 4.4   Chloride 91 (L) 96 (L) 101 100 99 99   CO2 29 31 (H) 28 27 27 28   BUN,BUNPL 74 (H) 61 (H) 19 16 31 (H) 21   Creatinine 2.4 (H) 1.9 (H) 1.2 (H) 1.2 (H) 1.5 (H) 1.3 (H)   Anion Gap 12 10 8 9 9 10   GFR Non- 24 32 54 54 42 49   GFR  24 32 54 54 42 49   Magnesium 1.6        GLUCOSE, FASTING, (H) 154 (H) 140 (H) 209 (H) 231 (H) 204 (H)   CALCIUM, SERUM, 082138 8.8 8.9 8.7 9.4 9.0 9.4 Phosphorus 4.5        Total Protein 6.2 (L)        Meter Glucose         Pro-BNP   1,232 (H) 1,720 (H) 502 (H) 806 (H)   Albumin 3.5        Alk Phos 63        ALT 23        AST 11        Bilirubin 0.5        WBC 10.5 9.6       RBC 4.16 4.13       Hemoglobin Quant 13.1 13.0       Hematocrit 41.3 40.4       MCV 99.3 97.8       MCH 31.5 31.5       MCHC 31.7 (L) 32.2       MPV 10.6 10.2       RDW 11.4 (L) 11.5       Platelet Count 509 359           IMAGING:    CXR (4/17/2022)  Impression  1.  Hypoinflation with increased bibasilar airspace opacities.  Differential  includes atelectasis, infection, and/or layering pleural effusions. 2.  Mild pulmonary vascular congestion again noted. CARDIAC TESTING:    NM Stress (4/16/2022)  Impression  1: There is a medium size, moderate intensity scar in the inferior,  inferior lateral and lateral wall with partial reversibility. 2  The inferior and inferior lateral wall appears mildly hypokinetic  with estimated left ventricular ejection fraction of 40%  3: Low dose CT attenuation correction protocol was utilized for this study  4: Please see separate report for EKG and hemodynamic aspect of the stress test.  5: RISK SCAN: Intermediate risk scan    TTE (4/14/2022)  Summary:   Technically difficult examination. Severe concentric left ventricular hypertrophy. Left ventricular diastolic filling is elevated . Ejection fraction is visually estimated at 50 to 55%. Normal right ventricular size and function. Mild mitral regurgitation is present. Mild tricuspid regurgitation. EKG  Sinus Rhythm   RBBB      ASSESSMENT:  1. Chronic HFimpEF  2. LVEF 35-45% on TTE 1/201 -->  LVEF 65% on TTE 6/5/2018 --> LVEF 55-60% on TTE 6/11/2021 -- > 50-55 4/11/2022  3. ACC stage C / NYHA class III  4. Euvolemic   1. CAD status post CABG in 2011 (LIMA-LAD, SVG-OM1).  Lexiscan MPS 4/16/2022 with medium sized moderate intensity scar of inferior, inferiolateral and lateral wall with partial reversibility. 5. HTN/LVH  6. Severe COPD with prior heavy tobacco abuse (quit 4/9/2022)  7. CKD  8. T2DM  9. PAD status post left popliteal and tibial atherectomy/angioplasty  10. HLD - on statin therapy   11. Obesity  12. DAYAN - on trilogy       PLAN:  1. Restart Entresto 24/26 mg twice daily     2. Continue rest of current cardiac medications     3. Follow up with CHF clinic as scheduled in 1 week     4. Needs eventual left heart catheterization however currently unable to lay flat for testing and no current anginal complaints. Continue medical therapy and will reevaluate at follow up with Dr. Iveth Kidd in 1 month,     5. Weigh yourself daily    -Stay Hydrated    -Diet should sodium restricted to 2 grams    -Again watch your daily weight trends and if you gain water weight please follow below instructions.    -If you gain 3-5 pounds in 2-3 days OR notice that you are retaining fluid in anyway just like you did before then take an extra dose of your water pill (Bumetanide/Bumex) every day until you lose the weight or feel better.     -If you notice that you have taken more than 2 extra doses in 1 week then please call and let us know. -If at any time you feel that you are retaining fluid, your medications are not working, or you feel ill in anyway, then please call us for either same day appointment or the next day, and for instructions. Our goal is to keep you out of the emergency room and the hospital and we have ways to do it. You just need to call us in a timely manner.     -If you become sick for other reasons, and notice that you are not urinating as much, the urine is very dark, you have significant diarrhea or vomiting, then please DO NOT take your water pill and CALL US immediately.     Susan ACHARYA-CNP  [facility] Cardiology

## 2022-06-02 NOTE — PATIENT INSTRUCTIONS
1. Restart Entresto 24/26 mg twice daily     2. Continue rest of current cardiac medications     3. Follow up with CHF clinic as scheduled in 1 week     4. Follow up with Dr. Shiraz Tierney in 1 month    5. Weigh yourself daily    -Stay Hydrated    -Diet should sodium restricted to 2 grams    -Again watch your daily weight trends and if you gain water weight please follow below instructions.    -If you gain 3-5 pounds in 2-3 days OR notice that you are retaining fluid in anyway just like you did before then take an extra dose of your water pill (Bumetanide/Bumex) every day until you lose the weight or feel better.     -If you notice that you have taken more than 2 extra doses in 1 week then please call and let us know. -If at any time you feel that you are retaining fluid, your medications are not working, or you feel ill in anyway, then please call us for either same day appointment or the next day, and for instructions. Our goal is to keep you out of the emergency room and the hospital and we have ways to do it. You just need to call us in a timely manner.     -If you become sick for other reasons, and notice that you are not urinating as much, the urine is very dark, you have significant diarrhea or vomiting, then please DO NOT take your water pill and CALL US immediately.

## 2022-06-06 ENCOUNTER — TELEPHONE (OUTPATIENT)
Dept: ADMINISTRATIVE | Age: 67
End: 2022-06-06

## 2022-06-06 ENCOUNTER — TELEPHONE (OUTPATIENT)
Dept: ENT CLINIC | Age: 67
End: 2022-06-06

## 2022-06-06 NOTE — TELEPHONE ENCOUNTER
MA spoke with patient to give Dr. Emma Jeronimo recommendations. Patient gave verbal understanding.      Electronically signed by Lux Norris MA on 6/6/22 at 3:12 PM EDT

## 2022-06-06 NOTE — TELEPHONE ENCOUNTER
Patient phoned office stating she is scheduled for MRI IAC 6/23/22, she is allergic to the iodine. Dr. Shelia Clarke prescribed prednisone and benadryl to take prior to imaging but patient states her sugar will get too high after taking 3 pills of prednisone. Will speak with Dr. Donn Olguin when he returns to the office.      Electronically signed by Nam Freire on 6/6/22 at 12:05 PM EDT

## 2022-06-06 NOTE — TELEPHONE ENCOUNTER
Please call patient she was told that needed follow up with Dr Preeti Arevalo in July.  Saw Alfredo Stephenson in June   Thanks

## 2022-06-06 NOTE — TELEPHONE ENCOUNTER
At patient's office visit she stated she has had contrast in the past and has been adequately treated prior with premedications. If her sugars elevate with the prednisone, contact PCP or endocrinologist and they can recommend increase dose of insulin at that time to combat increase in glucose . unknown

## 2022-06-10 ENCOUNTER — HOSPITAL ENCOUNTER (OUTPATIENT)
Dept: OTHER | Age: 67
Setting detail: THERAPIES SERIES
Discharge: HOME | End: 2022-06-10
Payer: MEDICARE

## 2022-06-10 ENCOUNTER — HOSPITAL ENCOUNTER (OUTPATIENT)
Age: 67
Discharge: HOME | End: 2022-06-10
Payer: MEDICARE

## 2022-06-10 VITALS
HEART RATE: 78 BPM | SYSTOLIC BLOOD PRESSURE: 178 MMHG | BODY MASS INDEX: 40.34 KG/M2 | RESPIRATION RATE: 20 BRPM | WEIGHT: 217 LBS | DIASTOLIC BLOOD PRESSURE: 92 MMHG | OXYGEN SATURATION: 93 %

## 2022-06-10 LAB
ALBUMIN SERPL-MCNC: 4.1 G/DL (ref 3.5–5.2)
ALP BLD-CCNC: 83 U/L (ref 35–104)
ALT SERPL-CCNC: 12 U/L (ref 0–32)
ANION GAP SERPL CALCULATED.3IONS-SCNC: 11 MMOL/L (ref 7–16)
AST SERPL-CCNC: 11 U/L (ref 0–31)
BILIRUB SERPL-MCNC: 0.3 MG/DL (ref 0–1.2)
BUN BLDV-MCNC: 23 MG/DL (ref 6–23)
CALCIUM SERPL-MCNC: 9.4 MG/DL (ref 8.6–10.2)
CHLORIDE BLD-SCNC: 100 MMOL/L (ref 98–107)
CO2: 28 MMOL/L (ref 22–29)
CREAT SERPL-MCNC: 1.4 MG/DL (ref 0.5–1)
GFR AFRICAN AMERICAN: 45
GFR NON-AFRICAN AMERICAN: 45 ML/MIN/1.73
GLUCOSE BLD-MCNC: 202 MG/DL (ref 74–99)
HBA1C MFR BLD: 6.6 % (ref 4–5.6)
POTASSIUM SERPL-SCNC: 4.7 MMOL/L (ref 3.5–5)
PRO-BNP: 520 PG/ML (ref 0–125)
SODIUM BLD-SCNC: 139 MMOL/L (ref 132–146)
TOTAL PROTEIN: 7.3 G/DL (ref 6.4–8.3)

## 2022-06-10 PROCEDURE — 36415 COLL VENOUS BLD VENIPUNCTURE: CPT

## 2022-06-10 PROCEDURE — 83880 ASSAY OF NATRIURETIC PEPTIDE: CPT

## 2022-06-10 PROCEDURE — 99214 OFFICE O/P EST MOD 30 MIN: CPT

## 2022-06-10 PROCEDURE — 80053 COMPREHEN METABOLIC PANEL: CPT

## 2022-06-10 PROCEDURE — 83036 HEMOGLOBIN GLYCOSYLATED A1C: CPT

## 2022-06-10 ASSESSMENT — PAIN SCALES - GENERAL: PAINLEVEL_OUTOF10: 0

## 2022-06-10 NOTE — PROGRESS NOTES
Congestive Heart Failure [id number] [employer] [address]   1955          Referring Provider:Dr Reyes Aguilar  Primary Care Physician: Dr Shaun Castañeda  Cardiologist: Dr Tonya Tucker  Nephrologist: Dr Hunter        History of Present Illness:     Yfn Bella is a 78 y.o. female with a history of HFpEF, most recent EF 55-60% 6-11-21. Patient Story:    She does  have dyspnea with exertion, shortness of breath, or decline in overall functional capacity. She does not have orthopnea, PND, nocturnal cough or hemoptysis. She does not have abdominal distention or bloating, early satiety, anorexia/change in appetite. She does have a good urinary response to oral diuretic. She does not have  lower extremity edema. She denies lightheadedness, dizziness. She denies palpitations, syncope or near syncope. She does not complain of chest pain, pressure, discomfort. Allergies   Allergen Reactions    Latex Hives    Bee Venom Anaphylaxis    Dilaudid [Hydromorphone Hcl] Itching    Dye [Iodides] Hives and Shortness Of Breath    Percocet [Oxycodone-Acetaminophen] Shortness Of Breath and Itching    Keflex [Cephalexin] Itching and Rash    Lasix [Furosemide] Other (See Comments)     Pt states she cramps up and gets headaches    Levaquin [Levofloxacin In D5w] Hives    Lipitor      MUSCLE SPASMS    Lyrica [Pregabalin]      Dream disturbances    Morphine Hives    Naproxen      Unsure of reaction;pt states able to take Aleve without difficulties    Nefazodone Other (See Comments)    Norvasc [Amlodipine] Swelling    Oxycodone-Acetaminophen Swelling    Shellfish-Derived Products     Trazodone And Nefazodone            Prior to Visit Medications    Medication Sig Taking?  Authorizing Provider   sacubitril-valsartan (ENTRESTO) 24-26 MG per tablet Take 1 tablet by mouth 2 times daily  PATRIZIA Ponce - CNP   cetirizine (ZYRTEC) 10 MG tablet Take 1 tablet by mouth daily  Eden Diaz DO   nitroGLYCERIN (NITROSTAT) 0.3 MG SL tablet Place 0.3 mg under the tongue every 5 minutes as needed for Chest pain up to max of 3 total doses. If no relief after 1 dose, call 911. Historical Provider, MD   isosorbide mononitrate (IMDUR) 30 MG extended release tablet Take 3 tablets by mouth daily  Donnie Carrera MD   nicotine (NICODERM CQ) 14 MG/24HR Place 1 patch onto the skin daily  Patient not taking: Reported on 6/10/2022  Donnie Carrera MD   acetaminophen (TYLENOL) 325 MG tablet Take 1,000 mg by mouth every 6 hours as needed for Pain   Historical Provider, MD   aspirin 81 MG chewable tablet Take 81 mg by mouth daily  Historical Provider, MD   Arformoterol Tartrate (BROVANA) 15 MCG/2ML NEBU Take 1 ampule by nebulization 2 times daily as needed  Historical Provider, MD   azelastine (ASTELIN) 0.1 % nasal spray 1 spray by Nasal route 2 times daily as needed for Rhinitis  Historical Provider, MD   DULoxetine (CYMBALTA) 60 MG extended release capsule Take 60 mg by mouth 2 times daily  Historical Provider, MD   famotidine (PEPCID) 40 MG tablet Take 40 mg by mouth Daily with supper  Historical Provider, MD   fluticasone (FLONASE) 50 MCG/ACT nasal spray 2 sprays by Nasal route daily  Historical Provider, MD   gabapentin (NEURONTIN) 100 MG capsule Take 100 mg by mouth 2 times daily.   Historical Provider, MD   Insulin Degludec (TRESIBA FLEXTOUCH) 200 UNIT/ML SOPN Inject 50 Units into the skin at bedtime  Historical Provider, MD   insulin lispro, 1 Unit Dial, (HUMALOG KWIKPEN) 100 UNIT/ML SOPN Inject 0-6 Units into the skin 3 times daily (before meals) *Per Sliding Scale*  Historical Provider, MD   montelukast (SINGULAIR) 10 MG tablet Take 10 mg by mouth nightly  Historical Provider, MD   mirabegron (MYRBETRIQ) 50 MG TB24 Take 50 mg by mouth at bedtime  Historical Provider, MD   simvastatin (ZOCOR) 20 MG tablet Take 20 mg by mouth nightly  Historical Provider, MD   tiotropium [facility] RESPIMAT) 1.25 MCG/ACT AERS inhaler Inhale 2 puffs into the lungs daily  Historical Provider, MD   traZODone (DESYREL) 100 MG tablet Take 100 mg by mouth nightly  Historical Provider, MD   vitamin D (CHOLECALCIFEROL) 25 MCG (1000 UT) TABS tablet Take 1,000 Units by mouth daily  Historical Provider, MD   bumetanide (BUMEX) 1 MG tablet Take 1 tablet by mouth daily  Yarely Leyva MD   carvedilol (COREG) 25 MG tablet Take 1 tablet by mouth 2 times daily (with meals)  Yarely Leyva MD   hydrALAZINE (APRESOLINE) 50 MG tablet Take 50 mg by mouth 3 times daily  Historical Provider, MD   pantoprazole (PROTONIX) 40 MG tablet Take 40 mg by mouth daily   Historical Provider, MD   docusate sodium (COLACE) 100 MG capsule Take 200 mg by mouth at bedtime   Historical Provider, MD           Guideline directed medical:  ARNI/ACE I/ARB: no  Beta blocker yes  Aldosterone antagonist:  No        Physical Examination:     BP (!) 178/92   Pulse 78   Resp 20   Wt 217 lb (98.4 kg)   LMP 01/01/1990   SpO2 93%   BMI 40.34 kg/m² Patient states Dr Alissa Gonzalez aware of her B/P and ordered CMP which was completed    Assessment  Charting Type: Shift assessment (chf clinic)    Neurological  Level of Consciousness: Alert (0)              Respiratory  Respiratory Quality/Effort: Dyspnea with exertion  Chest Assessment: Chest expansion symmetrical    Breath Sounds  Right Upper Lobe: Clear  Right Middle Lobe: Clear  Right Lower Lobe: Clear  Left Upper Lobe: Clear  Left Lower Lobe: Clear         Cardiac  Cardiac Rhythm: Sinus rhythm    Rhythm Interpretation  Heart Rate: 78         Gastrointestinal  Abdominal (WDL): Within Defined Limits  Abdomen Inspection: Soft               Peripheral Vascular  Peripheral Vascular (WDL): Within Defined Limits  Edema: None                   Genitourinary  Genitourinary (WDL): Within Defined Limits    Psychosocial  Psychosocial (WDL): Within Defined Limits    Pain Assessment  Pain Level: 0                   Heart Rate: 78                     LAB DATA:    Last 3 BMP      Sodium (mmol/L)   Date Value   05/25/2022 137   05/17/2022 135   05/03/2022 136     Potassium (mmol/L)   Date Value   05/25/2022 4.4   05/17/2022 4.5   05/03/2022 4.3     Potassium reflex Magnesium (mmol/L)   Date Value   04/19/2022 4.2   04/11/2022 4.9   07/20/2021 4.1     Chloride (mmol/L)   Date Value   05/25/2022 99   05/17/2022 99   05/03/2022 100     CO2 (mmol/L)   Date Value   05/25/2022 28   05/17/2022 27   05/03/2022 27     BUN (mg/dL)   Date Value   05/25/2022 21   05/17/2022 31 (H)   05/03/2022 16     Glucose (mg/dL)   Date Value   05/25/2022 204 (H)   05/17/2022 231 (H)   05/03/2022 209 (H)   12/01/2011 181 (H)   10/05/2011 133 (H)   08/21/2011 94     Calcium (mg/dL)   Date Value   05/25/2022 9.4   05/17/2022 9.0   05/03/2022 9.4       Last 3 BNP       Pro-BNP (pg/mL)   Date Value   05/25/2022 806 (H)   05/17/2022 502 (H)   05/03/2022 1,720 (H)          CBC: No results for input(s): WBC, HGB, PLT in the last 72 hours. BMP:    No results for input(s): NA, K, CL, CO2, BUN, CREATININE, GLUCOSE in the last 72 hours. Hepatic: No results for input(s): AST, ALT, ALB, BILITOT, ALKPHOS in the last 72 hours. Troponin: No results for input(s): TROPONINI in the last 72 hours. BNP: No results for input(s): BNP in the last 72 hours. Lipids: No results for input(s): CHOL, HDL in the last 72 hours. Invalid input(s): LDLCALCU  INR: No results for input(s): INR in the last 72 hours. WEIGHTS:    Wt Readings from Last 3 Encounters:   06/10/22 217 lb (98.4 kg)   06/02/22 216 lb (98 kg)   05/25/22 215 lb 3.2 oz (97.6 kg)         TELEMETRY:  Cardiac Regularity: Regular  Cardiac Rhythm/Interpretation: NSR        ASSESSMENT:  Piyush Stein Gonzáles weight is stable, patient states Bumex IV given last visit she urinated at least 20 times Lungs clear .  No lower leg edema  She was encouraged to follow a 2000 mg sodium diet and stay hydrated with 64 ounces of fluid daily, verbalizes understanding handout giveen to patient . Patient states Dr Will Carballo aware of her elevated B/P and ordered bloodwork CMP HGAIC which was done    Interventions completed this visit:  IV diuretics given-No  Lab work obtained yes, BMP/BNP   Reviewed currently prescribed medications with patient, educated on importance of compliance and answered any questions regarding their medication  Educated on signs and symptoms of HF  Educated on low sodium diet    PLAN:  Scheduled to follow up in CHF clinic on   Future Appointments   Date Time Provider Melanie Sellers   6/23/2022  1:15 PM SEB MRI-B [facility] MRI [facility] Radiolog   6/24/2022  2:30 PM SEBZ CHF ROOM 1 [facility]   7/6/2022 11:20 AM Darline Landers MD [address] [facility]   7/21/2022  1:30 PM Pawel Du [address] [facility]   7/27/2022 10:30 AM DO JUSTA Ty [facility] - BEHAVIORAL HEALTH SERVICES ENT [facility]   10/27/2022  2:00 PM Sunny Mccurdy MD [facility]/MED [facility]     Given clinic phone number [phone] and aware of signs and symptoms to call with any HF change in symptoms.

## 2022-06-23 ENCOUNTER — HOSPITAL ENCOUNTER (OUTPATIENT)
Dept: MRI IMAGING | Age: 67
Discharge: HOME | End: 2022-06-25
Payer: MEDICARE

## 2022-06-23 DIAGNOSIS — H91.8X9 ASYMMETRICAL HEARING LOSS: ICD-10-CM

## 2022-06-23 PROCEDURE — A9577 INJ MULTIHANCE: HCPCS | Performed by: RADIOLOGY

## 2022-06-23 PROCEDURE — 6360000004 HC RX CONTRAST MEDICATION: Performed by: RADIOLOGY

## 2022-06-23 PROCEDURE — 70553 MRI BRAIN STEM W/O & W/DYE: CPT

## 2022-06-23 RX ORDER — SODIUM CHLORIDE 0.9 % (FLUSH) 0.9 %
5-40 SYRINGE (ML) INJECTION 2 TIMES DAILY
Status: DISCONTINUED | OUTPATIENT
Start: 2022-06-23 | End: 2022-06-26 | Stop reason: HOSPADM

## 2022-06-23 RX ADMIN — GADOBENATE DIMEGLUMINE 20 ML: 529 INJECTION, SOLUTION INTRAVENOUS at 14:56

## 2022-06-24 ENCOUNTER — HOSPITAL ENCOUNTER (OUTPATIENT)
Dept: OTHER | Age: 67
Setting detail: THERAPIES SERIES
Discharge: HOME | End: 2022-06-24
Payer: MEDICARE

## 2022-06-24 VITALS
OXYGEN SATURATION: 97 % | DIASTOLIC BLOOD PRESSURE: 68 MMHG | HEART RATE: 58 BPM | SYSTOLIC BLOOD PRESSURE: 142 MMHG | WEIGHT: 209.4 LBS | RESPIRATION RATE: 18 BRPM | BODY MASS INDEX: 38.93 KG/M2

## 2022-06-24 LAB
ANION GAP SERPL CALCULATED.3IONS-SCNC: 12 MMOL/L (ref 7–16)
BUN BLDV-MCNC: 37 MG/DL (ref 6–23)
CALCIUM SERPL-MCNC: 9.3 MG/DL (ref 8.6–10.2)
CHLORIDE BLD-SCNC: 94 MMOL/L (ref 98–107)
CO2: 24 MMOL/L (ref 22–29)
CREAT SERPL-MCNC: 1.6 MG/DL (ref 0.5–1)
GFR AFRICAN AMERICAN: 39
GFR NON-AFRICAN AMERICAN: 39 ML/MIN/1.73
GLUCOSE BLD-MCNC: 160 MG/DL (ref 74–99)
POTASSIUM SERPL-SCNC: 4.8 MMOL/L (ref 3.5–5)
PRO-BNP: 792 PG/ML (ref 0–125)
SODIUM BLD-SCNC: 130 MMOL/L (ref 132–146)

## 2022-06-24 PROCEDURE — 36415 COLL VENOUS BLD VENIPUNCTURE: CPT

## 2022-06-24 PROCEDURE — 80048 BASIC METABOLIC PNL TOTAL CA: CPT

## 2022-06-24 PROCEDURE — 99214 OFFICE O/P EST MOD 30 MIN: CPT

## 2022-06-24 PROCEDURE — 83880 ASSAY OF NATRIURETIC PEPTIDE: CPT

## 2022-06-24 NOTE — PROGRESS NOTES
Congestive Heart Failure [address]   1955          Referring Provider:Dr En Goss  Primary Care Physician: Dr Milana Coker  Cardiologist: Dr Choudhary Or  Nephrologist: Dr Huntre        History of Present Illness:     Gerrianne Gilford is a 78 y.o. female with a history of HFpEF, most recent EF 55-60% 6-11-21. Patient Story:  Patient presents to clinic with daughter and weight is down 8 pounds since CHF clinic visit on 6/10/2022. She does  have dyspnea with exertion, shortness of breath, or decline in overall functional capacity. She does not have orthopnea, PND, nocturnal cough or hemoptysis. She does  have abdominal distention or bloating, early satiety, anorexia/change in appetite. She does have a good urinary response to oral diuretic. She does not have  lower extremity edema. She denies lightheadedness, dizziness. She denies palpitations, syncope or near syncope. She does not complain of chest pain, pressure, discomfort. Patient has noticed a change in her blood pressure since adding entresto. Patient states \"My blood pressure has been running in the 140's/80's\" patient denies dizziness or lightheadness since adding entresto. Patient had an MRI yesterday and benadryl and prednisone oral was given d/t dye allergy.         Allergies   Allergen Reactions    Latex Hives    Bee Venom Anaphylaxis    Dilaudid [Hydromorphone Hcl] Itching    Dye [Iodides] Hives and Shortness Of Breath    Percocet [Oxycodone-Acetaminophen] Shortness Of Breath and Itching    Keflex [Cephalexin] Itching and Rash    Lasix [Furosemide] Other (See Comments)     Pt states she cramps up and gets headaches    Levaquin [Levofloxacin In D5w] Hives    Lipitor      MUSCLE SPASMS    Lyrica [Pregabalin]      Dream disturbances    Morphine Hives    Naproxen      Unsure of reaction;pt states able to take Aleve without difficulties    Nefazodone Other (See Comments)    Norvasc [Amlodipine] Swelling    Oxycodone-Acetaminophen Swelling    Shellfish-Derived Products     Trazodone And Nefazodone            Prior to Visit Medications    Medication Sig Taking? Authorizing Provider   sacubitril-valsartan (ENTRESTO) 24-26 MG per tablet Take 1 tablet by mouth 2 times daily  PATRIZIA Tracey - CNP   cetirizine (ZYRTEC) 10 MG tablet Take 1 tablet by mouth daily  Jonah Diaz DO   nitroGLYCERIN (NITROSTAT) 0.3 MG SL tablet Place 0.3 mg under the tongue every 5 minutes as needed for Chest pain up to max of 3 total doses. If no relief after 1 dose, call 911. Historical Provider, MD   isosorbide mononitrate (IMDUR) 30 MG extended release tablet Take 3 tablets by mouth daily  Benigno Loya MD   acetaminophen (TYLENOL) 325 MG tablet Take 1,000 mg by mouth every 6 hours as needed for Pain   Historical Provider, MD   aspirin 81 MG chewable tablet Take 81 mg by mouth daily  Historical Provider, MD   Arformoterol Tartrate (BROVANA) 15 MCG/2ML NEBU Take 1 ampule by nebulization 2 times daily as needed  Historical Provider, MD   azelastine (ASTELIN) 0.1 % nasal spray 1 spray by Nasal route 2 times daily as needed for Rhinitis  Historical Provider, MD   DULoxetine (CYMBALTA) 60 MG extended release capsule Take 60 mg by mouth 2 times daily  Historical Provider, MD   famotidine (PEPCID) 40 MG tablet Take 40 mg by mouth Daily with supper  Historical Provider, MD   fluticasone (FLONASE) 50 MCG/ACT nasal spray 2 sprays by Nasal route daily  Historical Provider, MD   gabapentin (NEURONTIN) 100 MG capsule Take 100 mg by mouth 2 times daily.   Historical Provider, MD   Insulin Degludec (TRESIBA FLEXTOUCH) 200 UNIT/ML SOPN Inject 50 Units into the skin at bedtime  Historical Provider, MD   insulin lispro, 1 Unit Dial, (HUMALOG KWIKPEN) 100 UNIT/ML SOPN Inject 0-6 Units into the skin 3 times daily (before meals) *Per Sliding Scale*  Historical Provider, MD   montelukast (SINGULAIR) 10 MG tablet Take 10 mg by mouth nightly  Historical Provider, MD   mirabegron (MYRBETRIQ) 50 MG TB24 Take 50 mg by mouth at bedtime  Historical Provider, MD   simvastatin (ZOCOR) 20 MG tablet Take 20 mg by mouth nightly  Historical Provider, MD   tiotropium (SPIRIVA RESPIMAT) 1.25 MCG/ACT AERS inhaler Inhale 2 puffs into the lungs daily  Historical Provider, MD   traZODone (DESYREL) 100 MG tablet Take 100 mg by mouth nightly  Historical Provider, MD   vitamin D (CHOLECALCIFEROL) 25 MCG (1000 UT) TABS tablet Take 1,000 Units by mouth daily  Historical Provider, MD   bumetanide (BUMEX) 1 MG tablet Take 1 tablet by mouth daily  Melia Carpenter MD   carvedilol (COREG) 25 MG tablet Take 1 tablet by mouth 2 times daily (with meals)  Melia Carpenter MD   hydrALAZINE (APRESOLINE) 50 MG tablet Take 50 mg by mouth 3 times daily  Historical Provider, MD   pantoprazole (PROTONIX) 40 MG tablet Take 40 mg by mouth daily   Historical Provider, MD   docusate sodium (COLACE) 100 MG capsule Take 200 mg by mouth at bedtime   Historical Provider, MD           Guideline directed medical:  ARNI/ACE I/ARB: yes  Beta blocker yes  Aldosterone antagonist:  No        Physical Examination:     BP (!) 142/68   Pulse 58   Resp 18   Wt 209 lb 6.4 oz (95 kg)   LMP 01/01/1990   SpO2 97%   BMI 38.93 kg/m² Patient states Dr Patt Metcalf aware of her B/P and ordered CMP which was completed    Assessment  Charting Type: Shift assessment (chf clinic)    Neurological  Level of Consciousness: Alert (0)              Respiratory  Respiratory Quality/Effort: Dyspnea with exertion  Chest Assessment: Chest expansion symmetrical    Breath Sounds  Right Upper Lobe: Clear  Right Middle Lobe: Clear  Right Lower Lobe: Clear  Left Upper Lobe: Clear  Left Lower Lobe: Clear         Cardiac  Cardiac Rhythm: Sinus cortez    Rhythm Interpretation  Heart Rate: 58         Gastrointestinal  Abdominal (WDL): Within Defined Limits  Abdomen Inspection: Soft Peripheral Vascular  Peripheral Vascular (WDL): Within Defined Limits  Edema: None                   Genitourinary  Genitourinary (WDL): Within Defined Limits    Psychosocial  Psychosocial (WDL): Within Defined Limits                        Heart Rate: 58                     LAB DATA:    Last 3 BMP      Sodium (mmol/L)   Date Value   06/24/2022 130 (L)   06/10/2022 139   05/25/2022 137     Potassium (mmol/L)   Date Value   06/24/2022 4.8   06/10/2022 4.7   05/25/2022 4.4     Potassium reflex Magnesium (mmol/L)   Date Value   04/19/2022 4.2   04/11/2022 4.9   07/20/2021 4.1     Chloride (mmol/L)   Date Value   06/24/2022 94 (L)   06/10/2022 100   05/25/2022 99     CO2 (mmol/L)   Date Value   06/24/2022 24   06/10/2022 28   05/25/2022 28     BUN (mg/dL)   Date Value   06/24/2022 37 (H)   06/10/2022 23   05/25/2022 21     Glucose (mg/dL)   Date Value   06/24/2022 160 (H)   06/10/2022 202 (H)   05/25/2022 204 (H)   12/01/2011 181 (H)   10/05/2011 133 (H)   08/21/2011 94     Calcium (mg/dL)   Date Value   06/24/2022 9.3   06/10/2022 9.4   05/25/2022 9.4       Last 3 BNP       Pro-BNP (pg/mL)   Date Value   06/24/2022 792 (H)   06/10/2022 520 (H)   05/25/2022 806 (H)          CBC: No results for input(s): WBC, HGB, PLT in the last 72 hours. BMP:    Recent Labs     06/24/22  1400   *   K 4.8   CL 94*   CO2 24   BUN 37*   CREATININE 1.6*   GLUCOSE 160*     Hepatic: No results for input(s): AST, ALT, ALB, BILITOT, ALKPHOS in the last 72 hours. Troponin: No results for input(s): TROPONINI in the last 72 hours. BNP: No results for input(s): BNP in the last 72 hours. Lipids: No results for input(s): CHOL, HDL in the last 72 hours. Invalid input(s): LDLCALCU  INR: No results for input(s): INR in the last 72 hours.         WEIGHTS:    Wt Readings from Last 3 Encounters:   06/24/22 209 lb 6.4 oz (95 kg)   06/10/22 217 lb (98.4 kg)   06/02/22 216 lb (98 kg)         TELEMETRY:  Cardiac Regularity: Regular  Cardiac Rhythm/Interpretation: NSR        ASSESSMENT:  Anna Gooden weight is down 8 pounds,  Lungs clear . No lower leg edema  She was encouraged to follow a 2000 mg sodium diet and stay hydrated with 64 ounces of fluid daily, verbalizes understanding. Interventions completed this visit:  IV diuretics given-No  Lab work obtained yes, BMP/BNP per IV team  Reviewed currently prescribed medications with patient, educated on importance of compliance and answered any questions regarding their medication  Educated on signs and symptoms of HF  Educated on low sodium diet    PLAN:  Scheduled to follow up in CHF clinic on   Future Appointments   Date Time Provider Melanie Sellers   6/30/2022 11:15 AM MD MAR [facility]   7/6/2022 11:20 AM Sarkis Foster MD [address] [facility]   7/12/2022 12:00 PM [facility] [place] ROOM 1 [facility]   7/21/2022  1:30 PM Babatunde Stein, BINTA [facility] [facility]    7/27/2022 10:30 AM DO JUSTA [facility] - BEHAVIORAL HEALTH SERVICES ENT [facility]   10/27/2022  2:00 PM Alessandro Patel MD [facility]     Given clinic phone number [phone] and aware of signs and symptoms to call with any HF change in symptoms.

## 2022-06-29 ENCOUNTER — TELEPHONE (OUTPATIENT)
Dept: VASCULAR SURGERY | Age: 67
End: 2022-06-29

## 2022-06-30 ENCOUNTER — OFFICE VISIT (OUTPATIENT)
Dept: VASCULAR SURGERY | Age: 67
End: 2022-06-30
Payer: MEDICARE

## 2022-06-30 VITALS — BODY MASS INDEX: 39.27 KG/M2 | HEIGHT: 61 IN | WEIGHT: 208 LBS

## 2022-06-30 DIAGNOSIS — F17.200 TOBACCO DEPENDENCE: ICD-10-CM

## 2022-06-30 DIAGNOSIS — F12.90 MARIJUANA USE: Chronic | ICD-10-CM

## 2022-06-30 DIAGNOSIS — I73.9 PVD (PERIPHERAL VASCULAR DISEASE) WITH CLAUDICATION (HCC): Primary | ICD-10-CM

## 2022-06-30 DIAGNOSIS — Z98.62 STATUS POST PERIPHERAL ARTERY ANGIOPLASTY: ICD-10-CM

## 2022-06-30 DIAGNOSIS — Z99.81 O2 DEPENDENT: ICD-10-CM

## 2022-06-30 PROBLEM — N17.9 ACUTE KIDNEY INJURY SUPERIMPOSED ON CKD (HCC): Status: RESOLVED | Noted: 2022-04-12 | Resolved: 2022-06-30

## 2022-06-30 PROBLEM — N18.9 ACUTE KIDNEY INJURY SUPERIMPOSED ON CKD (HCC): Status: RESOLVED | Noted: 2022-04-12 | Resolved: 2022-06-30

## 2022-06-30 PROCEDURE — G8417 CALC BMI ABV UP PARAM F/U: HCPCS | Performed by: SURGERY

## 2022-06-30 PROCEDURE — 1090F PRES/ABSN URINE INCON ASSESS: CPT | Performed by: SURGERY

## 2022-06-30 PROCEDURE — G8427 DOCREV CUR MEDS BY ELIG CLIN: HCPCS | Performed by: SURGERY

## 2022-06-30 PROCEDURE — 4004F PT TOBACCO SCREEN RCVD TLK: CPT | Performed by: SURGERY

## 2022-06-30 PROCEDURE — 1123F ACP DISCUSS/DSCN MKR DOCD: CPT | Performed by: SURGERY

## 2022-06-30 PROCEDURE — 99213 OFFICE O/P EST LOW 20 MIN: CPT | Performed by: SURGERY

## 2022-06-30 PROCEDURE — 3017F COLORECTAL CA SCREEN DOC REV: CPT | Performed by: SURGERY

## 2022-06-30 PROCEDURE — G8399 PT W/DXA RESULTS DOCUMENT: HCPCS | Performed by: SURGERY

## 2022-06-30 NOTE — PROGRESS NOTES
every 5 minutes as needed for Chest pain up to max of 3 total doses. If no relief after 1 dose, call 911.  isosorbide mononitrate (IMDUR) 30 MG extended release tablet Take 3 tablets by mouth daily 30 tablet 3    acetaminophen (TYLENOL) 325 MG tablet Take 1,000 mg by mouth every 6 hours as needed for Pain       aspirin 81 MG chewable tablet Take 81 mg by mouth daily      Arformoterol Tartrate (BROVANA) 15 MCG/2ML NEBU Take 1 ampule by nebulization 2 times daily as needed      azelastine (ASTELIN) 0.1 % nasal spray 1 spray by Nasal route 2 times daily as needed for Rhinitis      DULoxetine (CYMBALTA) 60 MG extended release capsule Take 60 mg by mouth 2 times daily      famotidine (PEPCID) 40 MG tablet Take 40 mg by mouth Daily with supper      fluticasone (FLONASE) 50 MCG/ACT nasal spray 2 sprays by Nasal route daily      gabapentin (NEURONTIN) 100 MG capsule Take 100 mg by mouth 2 times daily.       Insulin Degludec (TRESIBA FLEXTOUCH) 200 UNIT/ML SOPN Inject 50 Units into the skin at bedtime      insulin lispro, 1 Unit Dial, (HUMALOG KWIKPEN) 100 UNIT/ML SOPN Inject 0-6 Units into the skin 3 times daily (before meals) *Per Sliding Scale*      montelukast (SINGULAIR) 10 MG tablet Take 10 mg by mouth nightly      mirabegron (MYRBETRIQ) 50 MG TB24 Take 50 mg by mouth at bedtime      simvastatin (ZOCOR) 20 MG tablet Take 20 mg by mouth nightly      tiotropium (SPIRIVA RESPIMAT) 1.25 MCG/ACT AERS inhaler Inhale 2 puffs into the lungs daily      traZODone (DESYREL) 100 MG tablet Take 100 mg by mouth nightly      vitamin D (CHOLECALCIFEROL) 25 MCG (1000 UT) TABS tablet Take 1,000 Units by mouth daily      bumetanide (BUMEX) 1 MG tablet Take 1 tablet by mouth daily 30 tablet 11    carvedilol (COREG) 25 MG tablet Take 1 tablet by mouth 2 times daily (with meals) 180 tablet 3    hydrALAZINE (APRESOLINE) 50 MG tablet Take 50 mg by mouth 3 times daily      pantoprazole (PROTONIX) 40 MG tablet Take 40 mg by mouth daily       docusate sodium (COLACE) 100 MG capsule Take 200 mg by mouth at bedtime        No current facility-administered medications for this visit.        Past Medical History:   Diagnosis Date    Asthma     Atherosclerosis of native artery of left leg with rest pain ([address] 11/09/2018    CAD (coronary artery disease) 2011    Cellulitis and abscess of trunk 04/14/2015    Cerebellar infarct ([address] 04/03/2019    Remote, rt. cerebellum, head CT scan, 1/9/19    Chronic back pain     Chronic kidney disease     Chronic systolic congestive heart failure ([address] 10/04/2013    Chronic venous insufficiency 10/11/2017    COPD (chronic obstructive pulmonary disease) ([address]     COVID-19     Depression     Diabetes mellitus ([address]     Diabetic neuropathy ([address]     Diverticulosis     Fatty liver 01/08/2016    per US    Glaucoma, open angle 02/01/2016    Mild-OU    Hepatic encephalopathy ([address] 02/07/2016    resolved    Hiatal hernia     History of blood transfusion     Hyperlipidemia     Hyperplastic colon polyp     Hypertension     Incontinence     Liver mass     Morbid obesity ([address]     Movement disorder     Myocardial infarction ([address] 2011    O2 dependent 09/16/2021    with bipap 3 liters    Osteoarthritis, generalized     Pain in both lower legs 10/11/2017    Peripheral vascular disease ([address]     Pneumonia 01/26/2014    Pulmonary edema     resolved    PVD (peripheral vascular disease) with claudication ([address] 08/30/2017    Sleep apnea     uses BI PAP    Status post peripheral artery angioplasty 10/31/2019    Tobacco abuse     Tobacco abuse 10/11/2017    Tobacco dependence 6/30/2022    Tubular adenoma polyp of rectum     Urinary tract infection due to ESBL Klebsiella 03/08/2017    Ventral hernia        Past Surgical History:   Procedure Laterality Date    ANGIOPLASTY  11/13/2018    Dr. Luke Light & athrectomy L SFA & Popliteal    BREAST SURGERY  2000    bilateral reduction  BRONCHIAL BRUSH BIOPSY  1/25/2013    Dr Tali Caicedo  04/20/2015    Dr. Emil Oconnell  12/2011     DR. Anna Bone,  follows Dr Tamie Bundy  11/15/2013    Dr Hermilo Cronin COLONOSCOPY  12/23/2016    Dr Omer Daily adenoma & hyperplastic polyps, melanosis coli (repeat one year 12/2017)    CORONARY ARTERY BYPASS GRAFT      ECHO COMPLETE  10/9/2013         ENDOSCOPY, COLON, DIAGNOSTIC  04/18/2017    GALLBLADDER SURGERY      gallstones removed, [facility] 8/2017    HYSTERECTOMY (CERVIX STATUS UNKNOWN)      JOINT REPLACEMENT  2011    LEFT KNEE    KNEE SURGERY      left knee replacement    LARYNGOSCOPY N/A 4/4/2022    DIRECT LARYNGOSCOPY WITH BIOPSY performed by Isai Diaz,  at [address] Left [id number]    LIVER BIOPSY  1/8/2016    [facility]    POLYSOMNOGRAPHY  1/2016    [facility]    UPPER GASTROINTESTINAL ENDOSCOPY  12/20/12    UPPER GASTROINTESTINAL ENDOSCOPY  12/23/2016    Dr Hermilo Cronin UPPER GASTROINTESTINAL ENDOSCOPY  02/08/2017       Family History   Problem Relation Age of Onset    Cancer Mother     Asthma Father        Social History     Socioeconomic History    Marital status:       Spouse name: Not on file    Number of children: 2    Years of education: Not on file    Highest education level: Not on file   Occupational History    Occupation: disability   Tobacco Use    Smoking status: Current Every Day Smoker     Packs/day: 0.25     Years: 40.00     Pack years: 10.00     Types: Cigarettes     Start date: 8/10/1974    Smokeless tobacco: Never Used    Tobacco comment: 2 cigs daily   Vaping Use    Vaping Use: Never used   Substance and Sexual Activity    Alcohol use: Not Currently     Comment: social    Drug use: Not Currently     Types: Marijuana Glendy Beat)     Comment: \"every 4th or 5th day out of the week\"    Sexual activity: Not on file   Other Topics Concern    Not on file   Social History Narrative    Pt lives with brother in her house-pt has never driven a car and depends on transportation     Social Determinants of Health     Financial Resource Strain:     Difficulty of Paying Living Expenses: Not on file   Food Insecurity:     Worried About Running Out of Food in the Last Year: Not on file    Hakan of Food in the Last Year: Not on file   Transportation Needs:     Lack of Transportation (Medical): Not on file    Lack of Transportation (Non-Medical): Not on file   Physical Activity:     Days of Exercise per Week: Not on file    Minutes of Exercise per Session: Not on file   Stress:     Feeling of Stress : Not on file   Social Connections:     Frequency of Communication with Friends and Family: Not on file    Frequency of Social Gatherings with Friends and Family: Not on file    Attends Faith Services: Not on file    Active Member of [address] [place] [address] or Organizations: Not on file    Attends Club or Organization Meetings: Not on file    Marital Status: Not on file   Intimate Partner Violence:     Fear of Current or Ex-Partner: Not on file    Emotionally Abused: Not on file    Physically Abused: Not on file    Sexually Abused: Not on file   Housing Stability:     Unable to Pay for Housing in the Last Year: Not on file    Number of [place] in the Last Year: Not on file    Unstable Housing in the Last Year: Not on file       Review of Systems:    Eyes:  No blurring, diplopia or vision loss. Respiratory:  No cough, pleuritic chest pain, dyspnea, or wheezing. Tobacco use, chronic obstructive lung disease chronic hypoxic respiratory failure, marijuana use  Cardiovascular: No angina, palpitations . Hypertension, hyperlipidemia, coronary artery disease history of ischemic cardiomyopathy  Musculoskeletal:  No arthritis or weakness.   Significant musculoskeletal problems including arthritis of the back and the knee joints, history of herniated disc  Neurologic:  No paralysis, paresis, paresthesia, seizures or headache. Endocrinology:           Physical Exam:  General appearance:  Alert, awake, oriented x 3. No distress. Eyes:  Conjunctivae appear normal; PERRL  Neck:  No jugular venous distention, lymphadenopathy or thyromegaly. No evidence of carotid bruit  Lungs:  Clear to ausculation bilaterally. No rhonchi, crackles, wheezes  Heart:  Regular rate and rhythm. No rub or murmur  Abdomen:  Soft, non-tender. No masses, organomegaly. Musculoskeletal : No joint effusions, tenderness swelling    Neuro: Speech is intact. Moving all extremities. No focal motor or sensory deficits      Extremities:  Both feet are warm to touch. The color of both feet is normal.        Pulses Right  Left    Brachial 3 3    Radial    3=normal   Femoral 2 2  2=diminished   Popliteal    1=barely palpable   Dorsalis pedis    0=absent   Posterior tibial 0 0  4=aneurysmal             Other pertinent information:1. The past medical records were reviewed. Assessment:    1. PVD (peripheral vascular disease) with claudication ([address]    2. Tobacco dependence    3. Status post peripheral artery angioplasty    4. Marijuana use, smoked              Plan:       Discussed the patient, informed her her symptoms of pain in the legs at nighttime, daytime and standing, most likely musculoskeletal and neurological particularly of back problems, based upon the last ankle-brachial index, she has adequate collaterals at rest    Patient was counseled to stop smoking completely, and also discussed with her PCP to have the back reevaluated    Patient also recommended complete lower [place] artery Doppler study and then make additional recommendations              Patient was instructed to continue walking program and to call if any worsening of symptoms and to call if any focal lateralizing neurological symptoms like loss of speech, vision or loss of function of extremity.         All the questions were answered. Orders Placed This Encounter   Procedures    VL LOWER EXTREMITY ARTERIAL SEGMENTAL PRESSURES W PPG             Indicated follow-up: Return in about 1 year (around 6/30/2023), or if symptoms worsen or fail to improve.

## 2022-07-01 ENCOUNTER — TELEPHONE (OUTPATIENT)
Dept: VASCULAR SURGERY | Age: 67
End: 2022-07-01

## 2022-07-05 ENCOUNTER — HOSPITAL ENCOUNTER (OUTPATIENT)
Dept: CARDIOLOGY | Age: 67
Discharge: HOME | End: 2022-07-05
Payer: MEDICARE

## 2022-07-05 DIAGNOSIS — I73.9 PVD (PERIPHERAL VASCULAR DISEASE) WITH CLAUDICATION (HCC): ICD-10-CM

## 2022-07-05 PROCEDURE — 93923 UPR/LXTR ART STDY 3+ LVLS: CPT

## 2022-07-06 ENCOUNTER — TELEPHONE (OUTPATIENT)
Dept: ENT CLINIC | Age: 67
End: 2022-07-06

## 2022-07-06 ENCOUNTER — OFFICE VISIT (OUTPATIENT)
Dept: CARDIOLOGY CLINIC | Age: 67
End: 2022-07-06
Payer: MEDICARE

## 2022-07-06 VITALS
WEIGHT: 213.2 LBS | HEIGHT: 61 IN | RESPIRATION RATE: 16 BRPM | DIASTOLIC BLOOD PRESSURE: 80 MMHG | SYSTOLIC BLOOD PRESSURE: 120 MMHG | HEART RATE: 70 BPM | BODY MASS INDEX: 40.25 KG/M2

## 2022-07-06 DIAGNOSIS — I50.42 CHRONIC COMBINED SYSTOLIC AND DIASTOLIC HEART FAILURE (HCC): Primary | ICD-10-CM

## 2022-07-06 PROCEDURE — 99214 OFFICE O/P EST MOD 30 MIN: CPT | Performed by: INTERNAL MEDICINE

## 2022-07-06 PROCEDURE — G8427 DOCREV CUR MEDS BY ELIG CLIN: HCPCS | Performed by: INTERNAL MEDICINE

## 2022-07-06 PROCEDURE — 3017F COLORECTAL CA SCREEN DOC REV: CPT | Performed by: INTERNAL MEDICINE

## 2022-07-06 PROCEDURE — 1090F PRES/ABSN URINE INCON ASSESS: CPT | Performed by: INTERNAL MEDICINE

## 2022-07-06 PROCEDURE — G8399 PT W/DXA RESULTS DOCUMENT: HCPCS | Performed by: INTERNAL MEDICINE

## 2022-07-06 PROCEDURE — G8417 CALC BMI ABV UP PARAM F/U: HCPCS | Performed by: INTERNAL MEDICINE

## 2022-07-06 PROCEDURE — 1123F ACP DISCUSS/DSCN MKR DOCD: CPT | Performed by: INTERNAL MEDICINE

## 2022-07-06 PROCEDURE — 4004F PT TOBACCO SCREEN RCVD TLK: CPT | Performed by: INTERNAL MEDICINE

## 2022-07-06 PROCEDURE — 93000 ELECTROCARDIOGRAM COMPLETE: CPT | Performed by: INTERNAL MEDICINE

## 2022-07-06 RX ORDER — CARVEDILOL 25 MG/1
25 TABLET ORAL 2 TIMES DAILY WITH MEALS
Qty: 180 TABLET | Refills: 3 | Status: SHIPPED | OUTPATIENT
Start: 2022-07-06

## 2022-07-06 RX ORDER — HYDRALAZINE HYDROCHLORIDE 50 MG/1
50 TABLET, FILM COATED ORAL 3 TIMES DAILY
Qty: 270 TABLET | Refills: 3 | Status: SHIPPED | OUTPATIENT
Start: 2022-07-06

## 2022-07-06 RX ORDER — ASPIRIN 81 MG/1
81 TABLET, CHEWABLE ORAL DAILY
Qty: 90 TABLET | Refills: 3 | Status: SHIPPED | OUTPATIENT
Start: 2022-07-06

## 2022-07-06 RX ORDER — SIMVASTATIN 20 MG
20 TABLET ORAL NIGHTLY
Qty: 90 TABLET | Refills: 3 | Status: SHIPPED | OUTPATIENT
Start: 2022-07-06

## 2022-07-06 RX ORDER — ISOSORBIDE MONONITRATE 30 MG/1
90 TABLET, EXTENDED RELEASE ORAL DAILY
Qty: 90 TABLET | Refills: 3 | Status: SHIPPED
Start: 2022-07-06 | End: 2022-07-29 | Stop reason: DRUGHIGH

## 2022-07-06 RX ORDER — BUMETANIDE 1 MG/1
1 TABLET ORAL DAILY
Qty: 90 TABLET | Refills: 3 | Status: SHIPPED | OUTPATIENT
Start: 2022-07-06

## 2022-07-06 NOTE — PROGRESS NOTES
OUTPATIENT CARDIOLOGY FOLLOW-UP    Name: Pamela Aleman    Age: 78 y.o. Primary Care Physician: Regina Pena DO    Date of Service: 7/6/2022    Chief Complaint:   Chief Complaint   Patient presents with    Congestive Heart Failure     Follow up - Patient has no complaints. Interim History:  Mrs. Juan Jose Motta is a 59-year-old  female with history of for coronary artery disease underwent CABG in 2011 and history of severe LV dysfunction with an EF of 25% based on echocardiogram done in 2013, cardiac cath in 2013 showed no progression of disease, chronic right bundle-branch block, type II diabetes, hypertension, hyperlipidemia, morbid obesity, active tobacco use still smoking about 10 cigarettes a day, marijuana use, obstructive sleep apnea on CPAP and history of severe peripheral vascular disease came for follow-up visit. She was last seen in the office was on 6/2/22 thank, Since her last evaluation, she has not had any further hospitalizations or ER visits. She was recently hospitalized from 7/15/2021 to 7/21/2021 with acute hypoxic respiratory failure secondary to acute COPD exacerbation and acute on chronic HFpEF. Since she was discharged from the hospital she is feeling much better however, she still smoking marijuana. She denies any significant leg edema. She is still confused about her medications. She was discharged home on Entresto 49/51 p.o. twice daily. She ran out of the prescription now she is taking only 24/26 mg p.o. twice daily. she was hospitalized from 4/19/2021 to 4/25/2021 with a COVID-19 pneumonia and was treated with steroids and antibiotics. .  She underwent left popliteal and tibial trunk atherectomy on 11/13/2018 and was initiated on plavix and aspirin. She was discharged home on oxygen and she has been using mainly at nighttime. She denies any chest pain, increased dyspnea or leg edema.  She is still smoking about 5 cigarettes a day and does not want to quit.  She told me her blood pressure sometimes running high at home. She has been using wrist blood pressure machine. She was was recommended to take Coreg 12.5 mg twice daily but she has been taking 25 mg in the evening and 50 mg in the morning. She is scheduled for hiatal hernia surgery at [facility] [place] on 8/11/2022. She denies any chest pain still gets winded with exertion. Still smoking but is 8 cigarettes a day. She told me that she is still smoking about 3 to 5 cigarettes a day and has hard time quitting. She still has intermittent cough with expectoration of sputum. Now, her blood pressures have been well controlled. She denies any dizziness, orthopnea, paroxysmal nocturnal dyspnea or leg edema. She has multiple allergies to grass and weeds. She denies any fever or chills. No palpitations, chest pain, syncope or presyncope. She is complaint with her medications and denies taking any over-the-counter medications. She denies using any other illicit drugs other than marijuana. She told me that she is complaint with her salt and fluid intake. SR on EKG.     Review of Systems:   Cardiac: As per HPI  General: No fever, chills  Pulmonary: As per HPI  HEENT: No visual disturbances, difficult swallowing  GI: No nausea, vomiting  Endocrine: No thyroid disease or DM  Musculoskeletal: PEDROZA x 4, no focal motor deficits  Skin: Intact, no rashes  Neuro/Psych: No headache or seizures    Past Medical History:  Past Medical History:   Diagnosis Date    Asthma     Atherosclerosis of native artery of left leg with rest pain ([facility] 11/09/2018    CAD (coronary artery disease) 2011    Cellulitis and abscess of trunk 04/14/2015    Cerebellar infarct ([facility] [facility] 04/03/2019    Remote, rt. cerebellum, head CT scan, 1/9/19    Chronic back pain     Chronic kidney disease     Chronic systolic congestive heart failure ([address] 10/04/2013    Chronic venous insufficiency 10/11/2017    COPD (chronic obstructive pulmonary disease) ([facility] [facility]     COVID-19     Depression     Diabetes mellitus ([address]     Diabetic neuropathy ([address]     Diverticulosis     Fatty liver 01/08/2016    per [place]    Glaucoma, open angle 02/01/2016    Mild-OU    Hepatic encephalopathy ([address] 02/07/2016    resolved    Hiatal hernia     History of blood transfusion     Hyperlipidemia     Hyperplastic colon polyp     Hypertension     Incontinence     Liver mass     Morbid obesity ([address]     Movement disorder     Myocardial infarction ([address] 2011    O2 dependent 09/16/2021    with bipap 3 liters    Osteoarthritis, generalized     Pain in both lower legs 10/11/2017    Peripheral vascular disease ([address]     Pneumonia 01/26/2014    Pulmonary edema     resolved    PVD (peripheral vascular disease) with claudication ([facility] [address] 08/30/2017    Sleep apnea     uses BI PAP    Status post peripheral artery angioplasty 10/31/2019    Tobacco abuse     Tobacco abuse 10/11/2017    Tobacco dependence 6/30/2022    Tubular adenoma polyp of rectum     Urinary tract infection due to ESBL Klebsiella 03/08/2017    Ventral hernia        Past Surgical History:  Past Surgical History:   Procedure Laterality Date    ANGIOPLASTY  11/13/2018    Dr. Balderrama Memory & athrectomy L SFA & Popliteal    BREAST SURGERY  2000    bilateral reduction    BRONCHIAL BRUSH BIOPSY  1/25/2013    Dr Shankar Weinstein  04/20/2015    Dr. Liz Montes  12/2011     DR. Kenji Boss,  follows Dr Jessica Iniguez  11/15/2013    Dr Jose M Cardona COLONOSCOPY  12/23/2016    Dr Gina Powell adenoma & hyperplastic polyps, melanosis coli (repeat one year 12/2017)    CORONARY ARTERY BYPASS GRAFT      ECHO COMPLETE  10/9/2013         ENDOSCOPY, COLON, DIAGNOSTIC  04/18/2017    GALLBLADDER SURGERY      gallstones removed, [facility] 8/2017    HYSTERECTOMY (CERVIX STATUS UNKNOWN)      JOINT REPLACEMENT  2011    LEFT KNEE  KNEE SURGERY      left knee replacement    LARYNGOSCOPY N/A 4/4/2022    DIRECT LARYNGOSCOPY WITH BIOPSY performed by Duyen Staples DO at [address] Left [id number]    LIVER BIOPSY  1/8/2016    [facility]    POLYSOMNOGRAPHY  1/2016    [facility]    UPPER GASTROINTESTINAL ENDOSCOPY  12/20/12    UPPER GASTROINTESTINAL ENDOSCOPY  12/23/2016    Dr Lane Goldsmith UPPER GASTROINTESTINAL ENDOSCOPY  02/08/2017       Family History:  Family History   Problem Relation Age of Onset    Cancer Mother     Asthma Father        Social History:  Social History     Socioeconomic History    Marital status:      Spouse name: Not on file    Number of children: 2    Years of education: Not on file    Highest education level: Not on file   Occupational History    Occupation: disability   Tobacco Use    Smoking status: Current Every Day Smoker     Packs/day: 0.25     Years: 40.00     Pack years: 10.00     Types: Cigarettes     Start date: 8/10/1974    Smokeless tobacco: Never Used    Tobacco comment: 2 cigs daily   Vaping Use    Vaping Use: Never used   Substance and Sexual Activity    Alcohol use: Not Currently     Comment: social    Drug use: Yes     Types: Marijuana Elbertantonella Payne)     Comment: \"every 4th or 5th day out of the week\"    Sexual activity: Not on file   Other Topics Concern    Not on file   Social History Narrative    Pt lives with brother in her house-pt has never driven a car and depends on transportation     Social Determinants of Health     Financial Resource Strain:     Difficulty of Paying Living Expenses: Not on file   Food Insecurity:     Worried About [address] in the Last Year: Not on file    [place] of Food in the Last Year: Not on file   Transportation Needs:     Lack of Transportation (Medical): Not on file    Lack of Transportation (Non-Medical):  Not on file   Physical Activity:     Days of Exercise per Week: Not on file    Minutes of Exercise per Session: Not on file   Stress:     Feeling of Stress : Not on file   Social Connections:     Frequency of Communication with Friends and Family: Not on file    Frequency of Social Gatherings with Friends and Family: Not on file    Attends Tenriism Services: Not on file    Active Member of Clubs or Organizations: Not on file    Attends Club or Organization Meetings: Not on file    Marital Status: Not on file   Intimate Partner Violence:     Fear of Current or Ex-Partner: Not on file    Emotionally Abused: Not on file    Physically Abused: Not on file    Sexually Abused: Not on file   Housing Stability:     Unable to Pay for Housing in the Last Year: Not on file    Number of [place] in the Last Year: Not on file    Unstable Housing in the Last Year: Not on file       Allergies:   Allergies   Allergen Reactions    Latex Hives    Bee Venom Anaphylaxis    Dilaudid [Hydromorphone Hcl] Itching    Dye [Iodides] Hives and Shortness Of Breath    Percocet [Oxycodone-Acetaminophen] Shortness Of Breath and Itching    Keflex [Cephalexin] Itching and Rash    Lasix [Furosemide] Other (See Comments)     Pt states she cramps up and gets headaches    Levaquin [Levofloxacin In D5w] Hives    Lipitor      MUSCLE SPASMS    Lyrica [Pregabalin]      Dream disturbances    Morphine Hives    Naproxen      Unsure of reaction;pt states able to take Aleve without difficulties    Nefazodone Other (See Comments)    Norvasc [Amlodipine] Swelling    Oxycodone-Acetaminophen Swelling    Shellfish-Derived Products     Trazodone And Nefazodone        Current Medications:  Current Outpatient Medications   Medication Sig Dispense Refill    sacubitril-valsartan (ENTRESTO) 24-26 MG per tablet Take 1 tablet by mouth 2 times daily 60 tablet 3    cetirizine (ZYRTEC) 10 MG tablet Take 1 tablet by mouth daily 30 tablet 5    nitroGLYCERIN (NITROSTAT) 0.3 MG SL tablet Place 0.3 mg under the tongue every 5 minutes as needed for Chest pain up to max of 3 total doses. If no relief after 1 dose, call 911.  isosorbide mononitrate (IMDUR) 30 MG extended release tablet Take 3 tablets by mouth daily 30 tablet 3    acetaminophen (TYLENOL) 325 MG tablet Take 1,000 mg by mouth every 6 hours as needed for Pain       aspirin 81 MG chewable tablet Take 81 mg by mouth daily      Arformoterol Tartrate (BROVANA) 15 MCG/2ML NEBU Take 1 ampule by nebulization 2 times daily as needed      azelastine (ASTELIN) 0.1 % nasal spray 1 spray by Nasal route 2 times daily as needed for Rhinitis      DULoxetine (CYMBALTA) 60 MG extended release capsule Take 60 mg by mouth 2 times daily      famotidine (PEPCID) 40 MG tablet Take 40 mg by mouth Daily with supper      fluticasone (FLONASE) 50 MCG/ACT nasal spray 2 sprays by Nasal route daily      gabapentin (NEURONTIN) 100 MG capsule Take 100 mg by mouth 2 times daily.       Insulin Degludec (TRESIBA FLEXTOUCH) 200 UNIT/ML SOPN Inject 50 Units into the skin at bedtime      insulin lispro, 1 Unit Dial, (HUMALOG KWIKPEN) 100 UNIT/ML SOPN Inject 0-6 Units into the skin 3 times daily (before meals) *Per Sliding Scale*      montelukast (SINGULAIR) 10 MG tablet Take 10 mg by mouth nightly      mirabegron (MYRBETRIQ) 50 MG TB24 Take 50 mg by mouth at bedtime      simvastatin (ZOCOR) 20 MG tablet Take 20 mg by mouth nightly      tiotropium (SPIRIVA RESPIMAT) 1.25 MCG/ACT AERS inhaler Inhale 2 puffs into the lungs daily      traZODone (DESYREL) 100 MG tablet Take 100 mg by mouth nightly      vitamin D (CHOLECALCIFEROL) 25 MCG (1000 UT) TABS tablet Take 1,000 Units by mouth daily      bumetanide (BUMEX) 1 MG tablet Take 1 tablet by mouth daily 30 tablet 11    carvedilol (COREG) 25 MG tablet Take 1 tablet by mouth 2 times daily (with meals) 180 tablet 3    hydrALAZINE (APRESOLINE) 50 MG tablet Take 50 mg by mouth 3 times daily      pantoprazole (PROTONIX) 40 MG tablet Take 40 mg by mouth daily       docusate sodium (COLACE) 100 MG capsule Take 200 mg by mouth at bedtime        No current facility-administered medications for this visit. Physical Exam:  /80   Pulse 70   Resp 16   Ht 5' 1\" (1.549 m)   Wt 213 lb 3.2 oz (96.7 kg)   LMP 01/01/1990   BMI 40.28 kg/m²   Wt Readings from Last 3 Encounters:   07/06/22 213 lb 3.2 oz (96.7 kg)   06/30/22 208 lb (94.3 kg)   06/24/22 209 lb 6.4 oz (95 kg)     Appearance: Awake, alert and oriented x 3, no acute respiratory distress she is morbidly obese.   Skin: Intact, no rash  Head: Normocephalic, atraumatic  Eyes: EOMI, no conjunctival erythema  ENMT: No pharyngeal erythema, MMM, no rhinorrhea  Neck: Supple, no elevated JVP, no carotid bruits  Lungs: Clear to auscultation bilaterally except for scattered wheezing  Cardiac: Regular rate and rhythm, +S1S2, no murmurs apparent  Abdomen: Soft, nontender, +bowel sounds  Extremities: Moves all extremities x 4, trace lower extremity edema  Neurologic: No focal motor deficits apparent, normal mood and affect, alert and oriented x 3  Peripheral Pulses: Intact posterior tibial pulses bilaterally    Laboratory Tests:  Lab Results   Component Value Date    CREATININE 1.6 (H) 06/24/2022    BUN 37 (H) 06/24/2022     (L) 06/24/2022    K 4.8 06/24/2022    CL 94 (L) 06/24/2022    CO2 24 06/24/2022     Lab Results   Component Value Date/Time    MG 1.6 04/18/2022 04:12 AM     Lab Results   Component Value Date    WBC 9.6 04/19/2022    HGB 13.0 04/19/2022    HCT 40.4 04/19/2022    MCV 97.8 04/19/2022     04/19/2022     Lab Results   Component Value Date    ALT 12 06/10/2022    AST 11 06/10/2022    ALKPHOS 83 06/10/2022    BILITOT 0.3 06/10/2022     Lab Results   Component Value Date    CKTOTAL 88 01/05/2016    CKMB 1.2 01/05/2016    TROPONINI <0.01 04/19/2021    TROPONINI <0.01 03/12/2021    TROPONINI <0.01 09/19/2019     Lab Results   Component Value Date    INR 1.1 04/19/2021    INR 1.0 01/04/2019    INR 1.1 inferior and inferior lateral wall appears mildly hypokinetic   with estimated left ventricular ejection fraction of 40%   3: Low dose CT attenuation correction protocol was utilized for this   study   4: Please see separate report for EKG and hemodynamic aspect of the   stress test.   5: RISK SCAN: Intermediate risk scan             The ASCVD Risk score (Lynn Pittman, et al., 2013) failed to calculate for the following reasons: The valid total cholesterol range is 130 to 320 mg/dL  [facility]-4/20/2015:  IMPRESSION:   1. Atherosclerotic coronary disease. a. Status post remote bypass grafting. Patent SVG to OM circumflex. b.    Artie Vicenteuel to selectively cannulate left internal mammary artery      graft, but there is no  significant disease in the LAD itself. 2.    Moderate global left ventricular systolic dysfunction. ASSESSMENT:  · Preop cardiac evaluation prior to hiatal hernia surgery. · Heart failure with an improved ejection fraction with an EF of 40-45%-->65%>>>50-55%, euvolemic and hemodynamically stable   · ACC/AHA, stage C., NYHA functional class II, now euvolemic. Diastolic dysfunction   · CAD, status post CABG in 2011 LIMA to LAD, SVG to OM 1  · Morbid obesity with obstructive sleep apnea on Trilogy , unable to tolerate C-pap  · Hypertension-stable and well controlled. · Hyperlipidemia on statin  · Type II diabetes  · Tobacco and marijuana use, still smoking about 5 to 6 cigarettes a day. · PAD, S/p left popliteal and tibial atherectomy/angioplasty -stable  · Chronic RBBB.  · H/o COVID-19 pneumonia, recovered  · COPD secondary tobacco use disorder  · Chronic bronchitis secondary to tobacco use. Plan:   1. Patient's revised cardiac risk index is elevated (3), class III risk, 11% risk of major perioperative cardiac events.   Patient does not have any active cardiac symptoms including active chest pain, decompensated heart failure, uncontrolled arrhythmias or obstructive valve lesions. .  Recent stress test in April 2022 showed evidence of scar in the inferior lateral and lateral wall with no significant reversibility and no significant changes compared to prior stress test of 2018. Continue Coreg 25 mg p.o. twice daily perioperatively. No further cardiac testing is needed prior to hiatal hernia repair and proceed with surgery as scheduled. Please call me if have any further questions or concerns. 2. She is on guideline directed medical therapy for heart failure with improved ejection fraction, continue Entresto 24/26 mg  p.o. twice daily, spironolactone 25 mg po daily  and Coreg  25 mg p.o. twice daily  3. Continue Bumex to Bumex to 1 mg po daily due to increased urination. 4. She was strongly encouraged to adhere to strict cardiac diet and restrict daily salt intake to less than 2 g  5. Continue rest of the medications including hydralazine 25 mg 3 times a day and continue Imdur 60 mg p.o. daily. All her cardiac medications are reviewed and refills were given for a month. 6. She was advised again to stop smoking and told me that she does not want to quit. 7. Follow up in one 3 months. The patient's current medication list, allergies, problem list and results of all previously ordered testing were reviewed at today's visit.   Bahman Kirkland MD  [facility]) Cardiology

## 2022-07-06 NOTE — PATIENT INSTRUCTIONS
1. She is on guideline directed medical therapy for heart failure with improved ejection fraction, continue Entresto 24/26 mg  p.o. twice daily, spironolactone 25 mg po daily  and Coreg  25 mg p.o. twice daily  2. Continue Bumex to Bumex to 1 mg po daily due to increased urination. 3. She was strongly encouraged to adhere to strict cardiac diet and restrict daily salt intake to less than 2 g  4. Continue rest of the medications including hydralazine 25 mg 3 times a day and continue Imdur 60 mg p.o. daily. All her cardiac medications are reviewed and refills were given for a month. 5. She was advised again to stop smoking and told me that she does not want to quit. 6. Follow up in one 3 month.

## 2022-07-07 ENCOUNTER — TELEPHONE (OUTPATIENT)
Dept: VASCULAR SURGERY | Age: 67
End: 2022-07-07

## 2022-07-07 NOTE — TELEPHONE ENCOUNTER
Discussed with the patient, ankle-brachial index, 0.54 on the right 0.53 on the left, with worsening compared to last study    However, patient has symptoms mostly bothering her are at rest, aching in the back, thighs and the knees in the legs, not the feet, and usually at nighttime, as significant back problems, patient was informed that those symptoms are due to musculoskeletal issues and neurological etiology and not for vascular pathology    Certainly the difficulty walking could be coming due to combination of back problems and peripheral vascular disease    Patient also has significant cardiopulmonary issues on supplemental oxygen and also has some renal insufficiency    After extensive discussion, regarding conservative therapy versus intervention, it was finally decided to follow conservatively with instruction with patient call me immediately when increasing symptoms, at the time patient may require angiography, all her questions were answered

## 2022-07-08 NOTE — TELEPHONE ENCOUNTER
Dr Eliza Arredondo said the MRI of IACs area is normal in regards to her hearing loss. He will go over MRI in depth at her next apt. Patient notified.

## 2022-07-08 NOTE — PROCEDURES
[address]                  [address] [facility]                                VASCULAR REPORT    PATIENT NAME: Good Sarabia                  :        1955  MED REC NO:   [id number]                            ROOM:  ACCOUNT NO:   [de-identified]                           ADMIT DATE: 2022  PROVIDER:     Catherine Hinojosa MD    DATE OF PROCEDURE:  2022    LOWER EXTREMITY ARTERIAL DOPPLER STUDY    INDICATION:  Difficulty walking, also pain in both legs at rest,  actually from the hip to the ankle. FINDINGS:  The lower extremity arterial Doppler study revealed that the  patient does have Doppler evidence of right iliac and bilateral  femoropopliteal arterial occlusive disease, with ankle-brachial index  0.54 on the right and 0.52 on the left, with moderate worsening compared  to last year.         Jessy Rodriguez MD    D: 2022 12:31:00       T: 2022 12:33:22     [email or web address]  Job#: [id number]     Doc#: [id number]    CC:  Flora Quintana

## 2022-07-12 ENCOUNTER — TELEPHONE (OUTPATIENT)
Dept: CARDIOLOGY CLINIC | Age: 67
End: 2022-07-12

## 2022-07-12 ENCOUNTER — HOSPITAL ENCOUNTER (OUTPATIENT)
Dept: OTHER | Age: 67
Setting detail: THERAPIES SERIES
Discharge: HOME | End: 2022-07-12
Payer: MEDICARE

## 2022-07-12 VITALS
RESPIRATION RATE: 18 BRPM | BODY MASS INDEX: 39.94 KG/M2 | WEIGHT: 211.4 LBS | SYSTOLIC BLOOD PRESSURE: 162 MMHG | HEART RATE: 71 BPM | OXYGEN SATURATION: 91 % | DIASTOLIC BLOOD PRESSURE: 74 MMHG

## 2022-07-12 LAB
ANION GAP SERPL CALCULATED.3IONS-SCNC: 10 MMOL/L (ref 7–16)
BUN BLDV-MCNC: 22 MG/DL (ref 6–23)
CALCIUM SERPL-MCNC: 9.3 MG/DL (ref 8.6–10.2)
CHLORIDE BLD-SCNC: 101 MMOL/L (ref 98–107)
CO2: 27 MMOL/L (ref 22–29)
CREAT SERPL-MCNC: 1.4 MG/DL (ref 0.5–1)
GFR AFRICAN AMERICAN: 45
GFR NON-AFRICAN AMERICAN: 45 ML/MIN/1.73
GLUCOSE BLD-MCNC: 132 MG/DL (ref 74–99)
POTASSIUM SERPL-SCNC: 4.1 MMOL/L (ref 3.5–5)
PRO-BNP: 568 PG/ML (ref 0–125)
SODIUM BLD-SCNC: 138 MMOL/L (ref 132–146)

## 2022-07-12 PROCEDURE — 36415 COLL VENOUS BLD VENIPUNCTURE: CPT

## 2022-07-12 PROCEDURE — 99214 OFFICE O/P EST MOD 30 MIN: CPT

## 2022-07-12 PROCEDURE — 80048 BASIC METABOLIC PNL TOTAL CA: CPT

## 2022-07-12 PROCEDURE — 83880 ASSAY OF NATRIURETIC PEPTIDE: CPT

## 2022-07-12 NOTE — TELEPHONE ENCOUNTER
----- Message from Farrukh Solitario MD sent at 7/12/2022  4:50 PM EDT -----  Stable renal functions.

## 2022-07-12 NOTE — PROGRESS NOTES
Congestive Heart Failure [address]   1955          Referring Provider:Dr Weston Rabago  Primary Care Physician: Dr Gabbie Gonzalez  Cardiologist: Dr Leticia Davila  Nephrologist: Dr Hunter        History of Present Illness:     Marivel Noel is a 78 y.o. female with a history of HFpEF, most recent EF 55-60% 6-11-21. Patient Story:  She does have dyspnea with exertion, shortness of breath, or decline in overall functional capacity. She does not have orthopnea, PND, nocturnal cough or hemoptysis. She does  have abdominal distention or bloating, early satiety, anorexia/change in appetite. She does have a good urinary response to oral diuretic. She does not have lower extremity edema. She denies lightheadedness, dizziness. She denies palpitations, syncope or near syncope. She does not complain of chest pain, pressure, discomfort. Allergies   Allergen Reactions    Latex Hives    Bee Venom Anaphylaxis    Dilaudid [Hydromorphone Hcl] Itching    Dye [Iodides] Hives and Shortness Of Breath    Percocet [Oxycodone-Acetaminophen] Shortness Of Breath and Itching    Keflex [Cephalexin] Itching and Rash    Lasix [Furosemide] Other (See Comments)     Pt states she cramps up and gets headaches    Levaquin [Levofloxacin In D5w] Hives    Lipitor      MUSCLE SPASMS    Lyrica [Pregabalin]      Dream disturbances    Morphine Hives    Naproxen      Unsure of reaction;pt states able to take Aleve without difficulties    Nefazodone Other (See Comments)    Norvasc [Amlodipine] Swelling    Oxycodone-Acetaminophen Swelling    Shellfish-Derived Products     Trazodone And Nefazodone            Prior to Visit Medications    Medication Sig Taking?  Authorizing Provider   bumetanide (BUMEX) 1 MG tablet Take 1 tablet by mouth daily  Braxton Vergara MD   carvedilol (COREG) 25 MG tablet Take 1 tablet by mouth 2 times daily (with meals)  Braxton Vergara MD isosorbide mononitrate (IMDUR) 30 MG extended release tablet Take 3 tablets by mouth daily  Ashley Pillai MD   sacubitril-valsartan (ENTRESTO) 24-26 MG per tablet Take 1 tablet by mouth 2 times daily  Ashley Pillai MD   aspirin 81 MG chewable tablet Take 1 tablet by mouth daily  Ashley Pillai MD   hydrALAZINE (APRESOLINE) 50 MG tablet Take 1 tablet by mouth 3 times daily  Ashley Pillai MD   simvastatin (ZOCOR) 20 MG tablet Take 1 tablet by mouth nightly  Ashley Pillai MD   cetirizine (ZYRTEC) 10 MG tablet Take 1 tablet by mouth daily  Igor Diaz DO   nitroGLYCERIN (NITROSTAT) 0.3 MG SL tablet Place 0.3 mg under the tongue every 5 minutes as needed for Chest pain up to max of 3 total doses. If no relief after 1 dose, call 911. Historical Provider, MD   acetaminophen (TYLENOL) 325 MG tablet Take 1,000 mg by mouth every 6 hours as needed for Pain   Historical Provider, MD   Arformoterol Tartrate (BROVANA) 15 MCG/2ML NEBU Take 1 ampule by nebulization 2 times daily as needed  Historical Provider, MD   azelastine (ASTELIN) 0.1 % nasal spray 1 spray by Nasal route 2 times daily as needed for Rhinitis  Historical Provider, MD   DULoxetine (CYMBALTA) 60 MG extended release capsule Take 60 mg by mouth 2 times daily Patient only has supply to take once a day  Historical Provider, MD   famotidine (PEPCID) 40 MG tablet Take 40 mg by mouth Daily with supper  Historical Provider, MD   fluticasone (FLONASE) 50 MCG/ACT nasal spray 2 sprays by Nasal route daily  Historical Provider, MD   gabapentin (NEURONTIN) 100 MG capsule Take 100 mg by mouth 2 times daily.   Historical Provider, MD   Insulin Degludec (TRESIBA FLEXTOUCH) 200 UNIT/ML SOPN Inject 50 Units into the skin at bedtime  Historical Provider, MD   insulin lispro, 1 Unit Dial, (HUMALOG KWIKPEN) 100 UNIT/ML SOPN Inject 0-6 Units into the skin 3 times daily (before meals) *Per Sliding Scale*  Historical Provider, MD   montelukast (SINGULAIR) 10 MG tablet Take 10 mg by mouth nightly  Historical Provider, MD   mirabegron (MYRBETRIQ) 50 MG TB24 Take 50 mg by mouth at bedtime  Historical Provider, MD   tiotropium (SPIRIVA RESPIMAT) 1.25 MCG/ACT AERS inhaler Inhale 2 puffs into the lungs daily  Historical Provider, MD   traZODone (DESYREL) 100 MG tablet Take 100 mg by mouth nightly  Historical Provider, MD   vitamin D (CHOLECALCIFEROL) 25 MCG (1000 UT) TABS tablet Take 1,000 Units by mouth daily  Historical Provider, MD   pantoprazole (PROTONIX) 40 MG tablet Take 40 mg by mouth daily   Historical Provider, MD   docusate sodium (COLACE) 100 MG capsule Take 200 mg by mouth at bedtime   Historical Provider, MD           Guideline directed medical:  ARNI/ACE I/ARB: yes  Beta blocker yes  Aldosterone antagonist:  No        Physical Examination:     BP (!) 162/74   Pulse 71   Resp 18   Wt 211 lb 6.4 oz (95.9 kg)   LMP 01/01/1990   SpO2 91%   BMI 39.94 kg/m² Patient states Dr Isma Rosales aware of her B/P and ordered CMP which was completed    Assessment  Charting Type: Shift assessment (chf clinic)    Neurological  Level of Consciousness: Alert (0)              Respiratory  Respiratory Quality/Effort: Dyspnea with exertion  Chest Assessment: Chest expansion symmetrical    Breath Sounds  Right Upper Lobe: Clear  Right Middle Lobe: Clear  Right Lower Lobe: Clear  Left Upper Lobe: Clear  Left Lower Lobe: Clear         Cardiac  Cardiac Regularity: Regular  Cardiac Rhythm: Sinus rhythm    Rhythm Interpretation  Heart Rate: 71         Gastrointestinal  Abdominal (WDL): Within Defined Limits  Abdomen Inspection: Soft               Peripheral Vascular  Peripheral Vascular (WDL): Within Defined Limits  Edema: None                   Genitourinary  Genitourinary (WDL): Within Defined Limits    Psychosocial  Psychosocial (WDL): Within Defined Limits                        Heart Rate: 71                     LAB DATA:    Last 3 BMP      Sodium (mmol/L)   Date Value   06/24/2022 130 (L)   06/10/2022 139   05/25/2022 137     Potassium (mmol/L)   Date Value   06/24/2022 4.8   06/10/2022 4.7   05/25/2022 4.4     Potassium reflex Magnesium (mmol/L)   Date Value   04/19/2022 4.2   04/11/2022 4.9   07/20/2021 4.1     Chloride (mmol/L)   Date Value   06/24/2022 94 (L)   06/10/2022 100   05/25/2022 99     CO2 (mmol/L)   Date Value   06/24/2022 24   06/10/2022 28   05/25/2022 28     BUN (mg/dL)   Date Value   06/24/2022 37 (H)   06/10/2022 23   05/25/2022 21     Glucose (mg/dL)   Date Value   06/24/2022 160 (H)   06/10/2022 202 (H)   05/25/2022 204 (H)   12/01/2011 181 (H)   10/05/2011 133 (H)   08/21/2011 94     Calcium (mg/dL)   Date Value   06/24/2022 9.3   06/10/2022 9.4   05/25/2022 9.4       Last 3 BNP       Pro-BNP (pg/mL)   Date Value   06/24/2022 792 (H)   06/10/2022 520 (H)   05/25/2022 806 (H)          CBC: No results for input(s): WBC, HGB, PLT in the last 72 hours. BMP:    No results for input(s): NA, K, CL, CO2, BUN, CREATININE, GLUCOSE in the last 72 hours. Hepatic: No results for input(s): AST, ALT, ALB, BILITOT, ALKPHOS in the last 72 hours. Troponin: No results for input(s): TROPONINI in the last 72 hours. BNP: No results for input(s): BNP in the last 72 hours. Lipids: No results for input(s): CHOL, HDL in the last 72 hours. Invalid input(s): LDLCALCU  INR: No results for input(s): INR in the last 72 hours. WEIGHTS:    Wt Readings from Last 3 Encounters:   07/12/22 211 lb 6.4 oz (95.9 kg)   07/06/22 213 lb 3.2 oz (96.7 kg)   06/30/22 208 lb (94.3 kg)         TELEMETRY:  Cardiac Regularity: Regular  Cardiac Rhythm/Interpretation: NSR        ASSESSMENT:  Jaylen Gonzáles weight is up 2lbs from previous appt 6/24/2022. Lungs clear, no ower leg edema noted. Encouraged to follow a 2000mg sodium diet and stay hydrated with 64 ounces of fluid daily, verbalizes understanding.     Interventions completed this visit:  IV diuretics given no  Lab work obtained yes, BMP/BNP per IV team  Reviewed currently prescribed medications with patient, educated on importance of compliance and answered any questions regarding their medication  Educated on signs and symptoms of HF  Educated on low sodium diet    PLAN:  Scheduled to follow up in CHF clinic on   Future Appointments   Date Time Provider Melanie Sellers   7/21/2022  1:30 PM Pawel [address]   7/27/2022 10:30 AM Dwight Acevedo, [address] [facility]   8/9/2022 12:30 PM DAVID [facility] ROOM 1 DAVID [facility]   10/12/2022 11:20 AM Alexey Price MD [address] [facility]   7/6/2023 10:30 AM Edi Bell MD [facility]     Given clinic phone number [phone] and aware of signs and symptoms to call with any HF change in symptoms.

## 2022-07-27 ENCOUNTER — OFFICE VISIT (OUTPATIENT)
Dept: ENT CLINIC | Age: 67
End: 2022-07-27
Payer: MEDICARE

## 2022-07-27 VITALS
HEART RATE: 78 BPM | HEIGHT: 61 IN | BODY MASS INDEX: 39.08 KG/M2 | SYSTOLIC BLOOD PRESSURE: 182 MMHG | WEIGHT: 207 LBS | DIASTOLIC BLOOD PRESSURE: 100 MMHG

## 2022-07-27 DIAGNOSIS — R49.0 HOARSENESS: Primary | ICD-10-CM

## 2022-07-27 DIAGNOSIS — J38.1 REINKE'S EDEMA OF VOCAL FOLDS: ICD-10-CM

## 2022-07-27 DIAGNOSIS — H91.8X9 ASYMMETRICAL HEARING LOSS: ICD-10-CM

## 2022-07-27 DIAGNOSIS — J30.1 SEASONAL ALLERGIC RHINITIS DUE TO POLLEN: ICD-10-CM

## 2022-07-27 PROCEDURE — 99214 OFFICE O/P EST MOD 30 MIN: CPT | Performed by: OTOLARYNGOLOGY

## 2022-07-27 PROCEDURE — 31575 DIAGNOSTIC LARYNGOSCOPY: CPT | Performed by: OTOLARYNGOLOGY

## 2022-07-27 PROCEDURE — 1123F ACP DISCUSS/DSCN MKR DOCD: CPT | Performed by: OTOLARYNGOLOGY

## 2022-07-27 RX ORDER — AZELASTINE 1 MG/ML
2 SPRAY, METERED NASAL 2 TIMES DAILY
Qty: 120 ML | Refills: 1 | Status: SHIPPED
Start: 2022-07-27 | End: 2022-08-16 | Stop reason: SDUPTHER

## 2022-07-27 RX ORDER — FLUTICASONE PROPIONATE 50 MCG
2 SPRAY, SUSPENSION (ML) NASAL DAILY
Qty: 16 G | Refills: 5 | Status: SHIPPED | OUTPATIENT
Start: 2022-07-27

## 2022-07-27 ASSESSMENT — ENCOUNTER SYMPTOMS
ALLERGIC/IMMUNOLOGIC NEGATIVE: 1
EYES NEGATIVE: 1
VOICE CHANGE: 1
RESPIRATORY NEGATIVE: 1

## 2022-07-28 ENCOUNTER — TELEPHONE (OUTPATIENT)
Dept: CARDIOLOGY CLINIC | Age: 67
End: 2022-07-28

## 2022-07-28 RX ORDER — ISOSORBIDE MONONITRATE 30 MG/1
90 TABLET, EXTENDED RELEASE ORAL DAILY
Qty: 90 TABLET | Refills: 3 | OUTPATIENT
Start: 2022-07-28

## 2022-07-28 NOTE — TELEPHONE ENCOUNTER
Pharmacy requested refill of Imdur 30 mg (three tablets daily). Chart note states to continue 60 mg daily. Patient believes she has only been taking one tablet daily. Please advise.

## 2022-07-29 RX ORDER — HYDRALAZINE HYDROCHLORIDE 25 MG/1
TABLET, FILM COATED ORAL
Qty: 90 TABLET | Refills: 10 | OUTPATIENT
Start: 2022-07-29

## 2022-07-29 RX ORDER — ISOSORBIDE MONONITRATE 60 MG/1
60 TABLET, EXTENDED RELEASE ORAL DAILY
COMMUNITY

## 2022-07-29 NOTE — TELEPHONE ENCOUNTER
Patient notified of Dr. Florence Blackburn recommendation. Med list amended. She will use up her 30 mg tablets and call me when she runs out. We will e-scribe a 60 mg tablet at that time.

## 2022-08-08 ENCOUNTER — FOLLOWUP TELEPHONE ENCOUNTER (OUTPATIENT)
Dept: AUDIOLOGY | Age: 67
End: 2022-08-08

## 2022-08-08 NOTE — TELEPHONE ENCOUNTER
Patient called requesting appointment to adjust hearing aids. She has 2 sets, one set is 6years old from [place] and the other set is 10years old, from [facility]. Due to age of hearing aids and her fair to poor speech discrimination ability, new hearing aids are suggested. She is eligible through her [facility] Medicaid Dual Complete so she would need to call [facility]. She was unable to take down the phone number so told her she could call number on back of her insurance card and get information from [facility].      Anuj Tolbert M.A., [address] [facility] [id number]  Electronically signed by Nic Mckeon on 8/8/2022 at 8:57 AM

## 2022-08-09 ENCOUNTER — HOSPITAL ENCOUNTER (OUTPATIENT)
Dept: OTHER | Age: 67
Setting detail: THERAPIES SERIES
Discharge: HOME | End: 2022-08-09
Payer: MEDICARE

## 2022-08-09 VITALS
OXYGEN SATURATION: 91 % | SYSTOLIC BLOOD PRESSURE: 113 MMHG | HEART RATE: 77 BPM | BODY MASS INDEX: 39.3 KG/M2 | DIASTOLIC BLOOD PRESSURE: 59 MMHG | RESPIRATION RATE: 18 BRPM | WEIGHT: 208 LBS

## 2022-08-09 LAB
ANION GAP SERPL CALCULATED.3IONS-SCNC: 8 MMOL/L (ref 7–16)
BUN BLDV-MCNC: 24 MG/DL (ref 6–23)
CALCIUM SERPL-MCNC: 8.8 MG/DL (ref 8.6–10.2)
CHLORIDE BLD-SCNC: 105 MMOL/L (ref 98–107)
CO2: 25 MMOL/L (ref 22–29)
CREAT SERPL-MCNC: 1.3 MG/DL (ref 0.5–1)
GFR AFRICAN AMERICAN: 49
GFR NON-AFRICAN AMERICAN: 49 ML/MIN/1.73
GLUCOSE BLD-MCNC: 125 MG/DL (ref 74–99)
POTASSIUM SERPL-SCNC: 4.3 MMOL/L (ref 3.5–5)
PRO-BNP: 586 PG/ML (ref 0–125)
SODIUM BLD-SCNC: 138 MMOL/L (ref 132–146)

## 2022-08-09 PROCEDURE — 36415 COLL VENOUS BLD VENIPUNCTURE: CPT

## 2022-08-09 PROCEDURE — 99214 OFFICE O/P EST MOD 30 MIN: CPT

## 2022-08-09 PROCEDURE — 83880 ASSAY OF NATRIURETIC PEPTIDE: CPT

## 2022-08-09 PROCEDURE — 80048 BASIC METABOLIC PNL TOTAL CA: CPT

## 2022-08-09 NOTE — PROGRESS NOTES
Congestive Heart Failure [address]   1955          Referring Provider:Dr Marion Joy  Primary Care Physician: Dr Jeanie Arellano  Cardiologist: Dr Patricia Pavon  Nephrologist: Dr Hunter        History of Present Illness:     Hortensia Coker is a 78 y.o. female with a history of HFpEF, most recent EF 55-60% 6-11-21. Patient Story:  She does have dyspnea with exertion, shortness of breath, or decline in overall functional capacity. She does not have orthopnea, PND, nocturnal cough or hemoptysis. She does  have abdominal distention or bloating, early satiety, anorexia/change in appetite. She does have a good urinary response to oral diuretic. She does not have lower extremity edema. She denies lightheadedness, dizziness. She denies palpitations, syncope or near syncope. She does not complain of chest pain, pressure, discomfort. Allergies   Allergen Reactions    Latex Hives    Bee Venom Anaphylaxis    Dilaudid [Hydromorphone Hcl] Itching    Dye [Iodides] Hives and Shortness Of Breath    Percocet [Oxycodone-Acetaminophen] Shortness Of Breath and Itching    Keflex [Cephalexin] Itching and Rash    Lasix [Furosemide] Other (See Comments)     Pt states she cramps up and gets headaches    Levaquin [Levofloxacin In D5w] Hives    Lipitor      MUSCLE SPASMS    Lyrica [Pregabalin]      Dream disturbances    Morphine Hives    Naproxen      Unsure of reaction;pt states able to take Aleve without difficulties    Nefazodone Other (See Comments)    Norvasc [Amlodipine] Swelling    Oxycodone-Acetaminophen Swelling    Shellfish-Derived Products     Trazodone And Nefazodone            Prior to Visit Medications    Medication Sig Taking? Authorizing Provider   isosorbide mononitrate (IMDUR) 60 MG extended release tablet Take 60 mg by mouth in the morning.   Historical Provider, MD   fluticasone (FLONASE) 50 MCG/ACT nasal spray 2 sprays by Each Nostril route in the morning. Reagan Smallwood DO   azelastine (ASTELIN) 0.1 % nasal spray 2 sprays by Nasal route in the morning and 2 sprays before bedtime. Use in each nostril as directed. Reagan Smallwood DO   bumetanide (BUMEX) 1 MG tablet Take 1 tablet by mouth daily  Jyoti Renee MD   carvedilol (COREG) 25 MG tablet Take 1 tablet by mouth 2 times daily (with meals)  Jyoti Renee MD   sacubitril-valsartan (ENTRESTO) 24-26 MG per tablet Take 1 tablet by mouth 2 times daily  Jyoti Renee MD   aspirin 81 MG chewable tablet Take 1 tablet by mouth daily  Jyoti Renee MD   hydrALAZINE (APRESOLINE) 50 MG tablet Take 1 tablet by mouth 3 times daily  Jyoti Renee MD   simvastatin (ZOCOR) 20 MG tablet Take 1 tablet by mouth nightly  Jyoti Renee MD   cetirizine (ZYRTEC) 10 MG tablet Take 1 tablet by mouth daily  Santo Diaz DO   nitroGLYCERIN (NITROSTAT) 0.3 MG SL tablet Place 0.3 mg under the tongue every 5 minutes as needed for Chest pain up to max of 3 total doses. If no relief after 1 dose, call 911. Historical Provider, MD   acetaminophen (TYLENOL) 325 MG tablet Take 1,000 mg by mouth every 6 hours as needed for Pain   Historical Provider, MD   Arformoterol Tartrate (BROVANA) 15 MCG/2ML NEBU Take 1 ampule by nebulization 2 times daily as needed  Historical Provider, MD   azelastine (ASTELIN) 0.1 % nasal spray 1 spray by Nasal route 2 times daily as needed for Rhinitis  Historical Provider, MD   DULoxetine (CYMBALTA) 60 MG extended release capsule Take 60 mg by mouth 2 times daily Patient only has supply to take once a day  Historical Provider, MD   famotidine (PEPCID) 40 MG tablet Take 40 mg by mouth Daily with supper  Historical Provider, MD   fluticasone (FLONASE) 50 MCG/ACT nasal spray 2 sprays by Nasal route daily  Historical Provider, MD   gabapentin (NEURONTIN) 100 MG capsule Take 100 mg by mouth 2 times daily.   Historical Provider, MD   Insulin Degludec (TRESIBA FLEXTOUCH) 200 UNIT/ML SOPN Inject 50 Units into the skin at bedtime  Historical Provider, MD   insulin lispro, 1 Unit Dial, 100 UNIT/ML SOPN Inject 0-6 Units into the skin 3 times daily (before meals) *Per Sliding Scale*  Historical Provider, MD   montelukast (SINGULAIR) 10 MG tablet Take 10 mg by mouth nightly  Historical Provider, MD   mirabegron (MYRBETRIQ) 50 MG TB24 Take 50 mg by mouth at bedtime  Historical Provider, MD   tiotropium (SPIRIVA RESPIMAT) 1.25 MCG/ACT AERS inhaler Inhale 2 puffs into the lungs daily  Historical Provider, MD   traZODone (DESYREL) 100 MG tablet Take 100 mg by mouth nightly  Historical Provider, MD   vitamin D (CHOLECALCIFEROL) 25 MCG (1000 UT) TABS tablet Take 1,000 Units by mouth daily  Historical Provider, MD   pantoprazole (PROTONIX) 40 MG tablet Take 40 mg by mouth daily   Historical Provider, MD   docusate sodium (COLACE) 100 MG capsule Take 200 mg by mouth at bedtime   Historical Provider, MD             Physical Examination:     BP (!) 113/59   Pulse 77   Resp 18   Wt 208 lb (94.3 kg)   LMP 01/01/1990   SpO2 91%   BMI 39.30 kg/m²   Assessment  Charting Type: Shift assessment (chf clinic)    Neurological  Level of Consciousness: Alert (0)              Respiratory  Respiratory Quality/Effort: Dyspnea with exertion  Chest Assessment: Chest expansion symmetrical  L Breath Sounds: Diminished  R Breath Sounds: Diminished              Cardiac  Cardiac Regularity: Regular  Cardiac Rhythm: Sinus rhythm    Rhythm Interpretation  Heart Rate: 77         Gastrointestinal  Abdomen Inspection: Soft  RUQ Bowel Sounds: Active  LUQ Bowel Sounds: Active  RLQ Bowel Sounds: Active  LLQ Bowel Sounds: Active          Bowel Sounds  RUQ Bowel Sounds: Active  LUQ Bowel Sounds: Active  RLQ Bowel Sounds: Active  LLQ Bowel Sounds:  Active    Peripheral Vascular  Peripheral Vascular (WDL): Within Defined Limits  Edema: None                   Genitourinary  Genitourinary (WDL): Within Defined Limits    Psychosocial  Psychosocial (WDL): Within Defined Limits                        Heart Rate: 77                     LAB DATA:    Last 3 BMP      Sodium (mmol/L)   Date Value   08/09/2022 138   07/12/2022 138   06/24/2022 130 (L)     Potassium (mmol/L)   Date Value   08/09/2022 4.3   07/12/2022 4.1   06/24/2022 4.8     Potassium reflex Magnesium (mmol/L)   Date Value   04/19/2022 4.2   04/11/2022 4.9   07/20/2021 4.1     Chloride (mmol/L)   Date Value   08/09/2022 105   07/12/2022 101   06/24/2022 94 (L)     CO2 (mmol/L)   Date Value   08/09/2022 25   07/12/2022 27   06/24/2022 24     BUN (mg/dL)   Date Value   08/09/2022 24 (H)   07/12/2022 22   06/24/2022 37 (H)     Glucose (mg/dL)   Date Value   08/09/2022 125 (H)   07/12/2022 132 (H)   06/24/2022 160 (H)   12/01/2011 181 (H)   10/05/2011 133 (H)   08/21/2011 94     Calcium (mg/dL)   Date Value   08/09/2022 8.8   07/12/2022 9.3   06/24/2022 9.3       Last 3 BNP       Pro-BNP (pg/mL)   Date Value   08/09/2022 586 (H)   07/12/2022 568 (H)   06/24/2022 792 (H)          CBC: No results for input(s): WBC, HGB, PLT in the last 72 hours. BMP:    Recent Labs     08/09/22  1258      K 4.3      CO2 25   BUN 24*   CREATININE 1.3*   GLUCOSE 125*     Hepatic: No results for input(s): AST, ALT, ALB, BILITOT, ALKPHOS in the last 72 hours. Troponin: No results for input(s): TROPONINI in the last 72 hours. BNP: No results for input(s): BNP in the last 72 hours. Lipids: No results for input(s): CHOL, HDL in the last 72 hours. Invalid input(s): LDLCALCU  INR: No results for input(s): INR in the last 72 hours. WEIGHTS:    Wt Readings from Last 3 Encounters:   08/09/22 208 lb (94.3 kg)   07/27/22 207 lb (93.9 kg)   07/12/22 211 lb 6.4 oz (95.9 kg)         TELEMETRY:  Cardiac Regularity: Regular  Cardiac Rhythm/Interpretation: NSR        ASSESSMENT:  Lisa Diana is euvolemic with stable weights.  She is going for hernia repair surgery on 8/11/22 at [address] she says. Interventions completed this visit:  IV diuretics given -no  Lab work obtained yes, BMP/BNP per IV team  Reviewed currently prescribed medications with patient, educated on importance of compliance and answered any questions regarding their medication  Educated on signs and symptoms of HF  Educated on low sodium diet    PLAN:  Scheduled to follow up in CHF clinic on   Future Appointments   Date Time Provider Melanie Sellers   8/23/2022 12:30 PM [facility] ROOM 1 [facility] [place] HOD   9/12/2022  1:00 PM Ofelia Essex, SLP [address]   10/12/2022 11:20 AM Bahman Kirkland MD [address] [facility]   1/31/2023 10:30 AM DO Shanda Chu [address] [facility]   7/6/2023 10:30 AM Diana Carrillo MD [facility]     Given clinic phone number [phone] and aware of signs and symptoms to call with any HF change in symptoms.

## 2022-08-15 DIAGNOSIS — J30.1 SEASONAL ALLERGIC RHINITIS DUE TO POLLEN: Primary | ICD-10-CM

## 2022-08-15 NOTE — TELEPHONE ENCOUNTER
Patient is requesting a refill of Astelin    Electronically signed by Mary Gill MA on 8/15/22 at 10:36 AM EDT

## 2022-08-16 RX ORDER — AZELASTINE 1 MG/ML
2 SPRAY, METERED NASAL 2 TIMES DAILY
Qty: 120 ML | Refills: 5 | Status: SHIPPED | OUTPATIENT
Start: 2022-08-16

## 2022-08-23 ENCOUNTER — HOSPITAL ENCOUNTER (INPATIENT)
Age: 67
LOS: 3 days | Discharge: HOME | DRG: 920 | End: 2022-08-26
Attending: STUDENT IN AN ORGANIZED HEALTH CARE EDUCATION/TRAINING PROGRAM | Admitting: SURGERY
Payer: MEDICARE

## 2022-08-23 ENCOUNTER — APPOINTMENT (OUTPATIENT)
Dept: GENERAL RADIOLOGY | Age: 67
DRG: 920 | End: 2022-08-23
Payer: MEDICARE

## 2022-08-23 ENCOUNTER — APPOINTMENT (OUTPATIENT)
Dept: CT IMAGING | Age: 67
DRG: 920 | End: 2022-08-23
Payer: MEDICARE

## 2022-08-23 ENCOUNTER — HOSPITAL ENCOUNTER (OUTPATIENT)
Dept: OTHER | Age: 67
Discharge: HOME | End: 2022-08-23

## 2022-08-23 DIAGNOSIS — Z99.81 O2 DEPENDENT: ICD-10-CM

## 2022-08-23 DIAGNOSIS — I10 UNCONTROLLED HYPERTENSION: ICD-10-CM

## 2022-08-23 DIAGNOSIS — I25.10 CORONARY ARTERY DISEASE INVOLVING NATIVE CORONARY ARTERY OF NATIVE HEART WITHOUT ANGINA PECTORIS: Chronic | ICD-10-CM

## 2022-08-23 DIAGNOSIS — I25.5 ISCHEMIC CARDIOMYOPATHY: ICD-10-CM

## 2022-08-23 DIAGNOSIS — E55.9 VITAMIN D INSUFFICIENCY: ICD-10-CM

## 2022-08-23 DIAGNOSIS — R97.8 ELEVATED CA 19-9 LEVEL: ICD-10-CM

## 2022-08-23 DIAGNOSIS — E11.42 DIABETIC POLYNEUROPATHY ASSOCIATED WITH TYPE 2 DIABETES MELLITUS (HCC): ICD-10-CM

## 2022-08-23 DIAGNOSIS — Z86.73 HISTORY OF STROKE: ICD-10-CM

## 2022-08-23 DIAGNOSIS — F12.90 MARIJUANA USE: Chronic | ICD-10-CM

## 2022-08-23 DIAGNOSIS — I50.42 CHRONIC COMBINED SYSTOLIC AND DIASTOLIC HEART FAILURE (HCC): ICD-10-CM

## 2022-08-23 DIAGNOSIS — J38.3 VOCAL CORD MASS: ICD-10-CM

## 2022-08-23 DIAGNOSIS — M51.26 LUMBAR DISC HERNIATION: ICD-10-CM

## 2022-08-23 DIAGNOSIS — Z71.6 ENCOUNTER FOR TOBACCO USE CESSATION COUNSELING: ICD-10-CM

## 2022-08-23 DIAGNOSIS — J44.1 COPD EXACERBATION (HCC): ICD-10-CM

## 2022-08-23 DIAGNOSIS — M47.816 SPONDYLOSIS OF LUMBAR REGION WITHOUT MYELOPATHY OR RADICULOPATHY: ICD-10-CM

## 2022-08-23 DIAGNOSIS — Z91.199 NON-COMPLIANCE: ICD-10-CM

## 2022-08-23 DIAGNOSIS — N39.46 MIXED INCONTINENCE URGE AND STRESS: ICD-10-CM

## 2022-08-23 DIAGNOSIS — G47.33 OSA AND COPD OVERLAP SYNDROME (HCC): Chronic | ICD-10-CM

## 2022-08-23 DIAGNOSIS — I25.2 HISTORY OF NON-ST ELEVATION MYOCARDIAL INFARCTION (NSTEMI): ICD-10-CM

## 2022-08-23 DIAGNOSIS — K44.9 HIATAL HERNIA: ICD-10-CM

## 2022-08-23 DIAGNOSIS — J96.11 CHRONIC RESPIRATORY FAILURE WITH HYPOXIA AND HYPERCAPNIA (HCC): Chronic | ICD-10-CM

## 2022-08-23 DIAGNOSIS — I73.9 PVD (PERIPHERAL VASCULAR DISEASE) WITH CLAUDICATION (HCC): ICD-10-CM

## 2022-08-23 DIAGNOSIS — J44.9 OSA AND COPD OVERLAP SYNDROME (HCC): Chronic | ICD-10-CM

## 2022-08-23 DIAGNOSIS — Z98.62 STATUS POST PERIPHERAL ARTERY ANGIOPLASTY: ICD-10-CM

## 2022-08-23 DIAGNOSIS — R94.31 PROLONGED Q-T INTERVAL ON ECG: ICD-10-CM

## 2022-08-23 DIAGNOSIS — K76.1 CHRONIC PASSIVE HEPATIC CONGESTION: ICD-10-CM

## 2022-08-23 DIAGNOSIS — R06.03 RESPIRATORY DISTRESS: ICD-10-CM

## 2022-08-23 DIAGNOSIS — F33.0 MAJOR DEPRESSIVE DISORDER, RECURRENT EPISODE, MILD (HCC): ICD-10-CM

## 2022-08-23 DIAGNOSIS — I16.1 HYPERTENSIVE EMERGENCY: ICD-10-CM

## 2022-08-23 DIAGNOSIS — T81.49XA POSTOPERATIVE ABSCESS: Primary | ICD-10-CM

## 2022-08-23 DIAGNOSIS — I50.33 ACUTE ON CHRONIC DIASTOLIC (CONGESTIVE) HEART FAILURE (HCC): ICD-10-CM

## 2022-08-23 DIAGNOSIS — I87.2 CHRONIC VENOUS INSUFFICIENCY: ICD-10-CM

## 2022-08-23 DIAGNOSIS — Z72.0 TOBACCO ABUSE: ICD-10-CM

## 2022-08-23 DIAGNOSIS — F17.210 CIGARETTE SMOKER: Chronic | ICD-10-CM

## 2022-08-23 DIAGNOSIS — E66.01 SEVERE OBESITY (BMI 35.0-35.9 WITH COMORBIDITY) (HCC): ICD-10-CM

## 2022-08-23 DIAGNOSIS — F17.200 TOBACCO DEPENDENCE: ICD-10-CM

## 2022-08-23 DIAGNOSIS — J96.12 CHRONIC RESPIRATORY FAILURE WITH HYPOXIA AND HYPERCAPNIA (HCC): Chronic | ICD-10-CM

## 2022-08-23 DIAGNOSIS — K63.89 MELANOSIS COLI: ICD-10-CM

## 2022-08-23 DIAGNOSIS — I42.2 ASYMMETRIC SEPTAL HYPERTROPHY (HCC): ICD-10-CM

## 2022-08-23 LAB
ALBUMIN SERPL-MCNC: 3.8 G/DL (ref 3.5–5.2)
ALP BLD-CCNC: 75 U/L (ref 35–104)
ALT SERPL-CCNC: 15 U/L (ref 0–32)
ANION GAP SERPL CALCULATED.3IONS-SCNC: 8 MMOL/L (ref 7–16)
AST SERPL-CCNC: 11 U/L (ref 0–31)
BASOPHILS ABSOLUTE: 0.04 E9/L (ref 0–0.2)
BASOPHILS RELATIVE PERCENT: 0.4 % (ref 0–2)
BILIRUB SERPL-MCNC: 0.3 MG/DL (ref 0–1.2)
BILIRUBIN DIRECT: <0.2 MG/DL (ref 0–0.3)
BILIRUBIN, INDIRECT: NORMAL MG/DL (ref 0–1)
BUN BLDV-MCNC: 26 MG/DL (ref 6–23)
CALCIUM SERPL-MCNC: 9.1 MG/DL (ref 8.6–10.2)
CHLORIDE BLD-SCNC: 104 MMOL/L (ref 98–107)
CO2: 28 MMOL/L (ref 22–29)
CREAT SERPL-MCNC: 1.3 MG/DL (ref 0.5–1)
EOSINOPHILS ABSOLUTE: 0.26 E9/L (ref 0.05–0.5)
EOSINOPHILS RELATIVE PERCENT: 2.4 % (ref 0–6)
GFR AFRICAN AMERICAN: 49
GFR NON-AFRICAN AMERICAN: 49 ML/MIN/1.73
GLUCOSE BLD-MCNC: 101 MG/DL (ref 74–99)
HCT VFR BLD CALC: 40.2 % (ref 34–48)
HEMOGLOBIN: 12.6 G/DL (ref 11.5–15.5)
IMMATURE GRANULOCYTES #: 0.04 E9/L
IMMATURE GRANULOCYTES %: 0.4 % (ref 0–5)
INR BLD: 1
LACTIC ACID: 1.2 MMOL/L (ref 0.5–2.2)
LYMPHOCYTES ABSOLUTE: 0.86 E9/L (ref 1.5–4)
LYMPHOCYTES RELATIVE PERCENT: 8.1 % (ref 20–42)
MCH RBC QN AUTO: 31.3 PG (ref 26–35)
MCHC RBC AUTO-ENTMCNC: 31.3 % (ref 32–34.5)
MCV RBC AUTO: 99.8 FL (ref 80–99.9)
METER GLUCOSE: 115 MG/DL (ref 74–99)
METER GLUCOSE: 443 MG/DL (ref 74–99)
MONOCYTES ABSOLUTE: 0.26 E9/L (ref 0.1–0.95)
MONOCYTES RELATIVE PERCENT: 2.4 % (ref 2–12)
NEUTROPHILS ABSOLUTE: 9.21 E9/L (ref 1.8–7.3)
NEUTROPHILS RELATIVE PERCENT: 86.3 % (ref 43–80)
PDW BLD-RTO: 12.9 FL (ref 11.5–15)
PLATELET # BLD: 324 E9/L (ref 130–450)
PMV BLD AUTO: 9.9 FL (ref 7–12)
POTASSIUM REFLEX MAGNESIUM: 4.5 MMOL/L (ref 3.5–5)
PRO-BNP: 611 PG/ML (ref 0–125)
PROTHROMBIN TIME: 11.5 SEC (ref 9.3–12.4)
RBC # BLD: 4.03 E12/L (ref 3.5–5.5)
SARS-COV-2, NAAT: NOT DETECTED
SODIUM BLD-SCNC: 140 MMOL/L (ref 132–146)
TOTAL PROTEIN: 7.1 G/DL (ref 6.4–8.3)
TROPONIN, HIGH SENSITIVITY: 14 NG/L (ref 0–9)
WBC # BLD: 10.7 E9/L (ref 4.5–11.5)

## 2022-08-23 PROCEDURE — 85025 COMPLETE CBC W/AUTO DIFF WBC: CPT

## 2022-08-23 PROCEDURE — 85610 PROTHROMBIN TIME: CPT

## 2022-08-23 PROCEDURE — 94640 AIRWAY INHALATION TREATMENT: CPT

## 2022-08-23 PROCEDURE — 84484 ASSAY OF TROPONIN QUANT: CPT

## 2022-08-23 PROCEDURE — 82962 GLUCOSE BLOOD TEST: CPT

## 2022-08-23 PROCEDURE — 83605 ASSAY OF LACTIC ACID: CPT

## 2022-08-23 PROCEDURE — 80048 BASIC METABOLIC PNL TOTAL CA: CPT

## 2022-08-23 PROCEDURE — 6360000002 HC RX W HCPCS: Performed by: STUDENT IN AN ORGANIZED HEALTH CARE EDUCATION/TRAINING PROGRAM

## 2022-08-23 PROCEDURE — 94664 DEMO&/EVAL PT USE INHALER: CPT

## 2022-08-23 PROCEDURE — 99285 EMERGENCY DEPT VISIT HI MDM: CPT

## 2022-08-23 PROCEDURE — 80076 HEPATIC FUNCTION PANEL: CPT

## 2022-08-23 PROCEDURE — 36415 COLL VENOUS BLD VENIPUNCTURE: CPT

## 2022-08-23 PROCEDURE — 74176 CT ABD & PELVIS W/O CONTRAST: CPT

## 2022-08-23 PROCEDURE — 96375 TX/PRO/DX INJ NEW DRUG ADDON: CPT

## 2022-08-23 PROCEDURE — 6370000000 HC RX 637 (ALT 250 FOR IP): Performed by: NURSE PRACTITIONER

## 2022-08-23 PROCEDURE — 2060000000 HC ICU INTERMEDIATE R&B

## 2022-08-23 PROCEDURE — 87635 SARS-COV-2 COVID-19 AMP PRB: CPT

## 2022-08-23 PROCEDURE — 6370000000 HC RX 637 (ALT 250 FOR IP): Performed by: STUDENT IN AN ORGANIZED HEALTH CARE EDUCATION/TRAINING PROGRAM

## 2022-08-23 PROCEDURE — 71045 X-RAY EXAM CHEST 1 VIEW: CPT

## 2022-08-23 PROCEDURE — 83880 ASSAY OF NATRIURETIC PEPTIDE: CPT

## 2022-08-23 PROCEDURE — 96374 THER/PROPH/DIAG INJ IV PUSH: CPT

## 2022-08-23 PROCEDURE — 93005 ELECTROCARDIOGRAM TRACING: CPT | Performed by: STUDENT IN AN ORGANIZED HEALTH CARE EDUCATION/TRAINING PROGRAM

## 2022-08-23 PROCEDURE — 2580000003 HC RX 258: Performed by: STUDENT IN AN ORGANIZED HEALTH CARE EDUCATION/TRAINING PROGRAM

## 2022-08-23 RX ORDER — CARVEDILOL 25 MG/1
25 TABLET ORAL 2 TIMES DAILY WITH MEALS
Status: DISCONTINUED | OUTPATIENT
Start: 2022-08-24 | End: 2022-08-26 | Stop reason: HOSPADM

## 2022-08-23 RX ORDER — VITAMIN B COMPLEX
1000 TABLET ORAL DAILY
Status: DISCONTINUED | OUTPATIENT
Start: 2022-08-24 | End: 2022-08-26 | Stop reason: HOSPADM

## 2022-08-23 RX ORDER — ISOSORBIDE MONONITRATE 60 MG/1
60 TABLET, EXTENDED RELEASE ORAL DAILY
Status: DISCONTINUED | OUTPATIENT
Start: 2022-08-24 | End: 2022-08-26 | Stop reason: HOSPADM

## 2022-08-23 RX ORDER — POTASSIUM CHLORIDE 7.45 MG/ML
10 INJECTION INTRAVENOUS PRN
Status: DISCONTINUED | OUTPATIENT
Start: 2022-08-23 | End: 2022-08-26 | Stop reason: HOSPADM

## 2022-08-23 RX ORDER — CETIRIZINE HYDROCHLORIDE 10 MG/1
10 TABLET ORAL DAILY
Status: DISCONTINUED | OUTPATIENT
Start: 2022-08-24 | End: 2022-08-26 | Stop reason: HOSPADM

## 2022-08-23 RX ORDER — SIMVASTATIN 20 MG
20 TABLET ORAL NIGHTLY
Status: DISCONTINUED | OUTPATIENT
Start: 2022-08-23 | End: 2022-08-26 | Stop reason: HOSPADM

## 2022-08-23 RX ORDER — HYDRALAZINE HYDROCHLORIDE 50 MG/1
50 TABLET, FILM COATED ORAL 3 TIMES DAILY
Status: DISCONTINUED | OUTPATIENT
Start: 2022-08-23 | End: 2022-08-26 | Stop reason: HOSPADM

## 2022-08-23 RX ORDER — TRAZODONE HYDROCHLORIDE 50 MG/1
100 TABLET ORAL NIGHTLY
Status: DISCONTINUED | OUTPATIENT
Start: 2022-08-23 | End: 2022-08-26 | Stop reason: HOSPADM

## 2022-08-23 RX ORDER — SODIUM CHLORIDE 0.9 % (FLUSH) 0.9 %
10 SYRINGE (ML) INJECTION EVERY 12 HOURS SCHEDULED
Status: DISCONTINUED | OUTPATIENT
Start: 2022-08-24 | End: 2022-08-26 | Stop reason: HOSPADM

## 2022-08-23 RX ORDER — IPRATROPIUM BROMIDE AND ALBUTEROL SULFATE 2.5; .5 MG/3ML; MG/3ML
3 SOLUTION RESPIRATORY (INHALATION) ONCE
Status: COMPLETED | OUTPATIENT
Start: 2022-08-23 | End: 2022-08-23

## 2022-08-23 RX ORDER — POTASSIUM CHLORIDE 20 MEQ/1
40 TABLET, EXTENDED RELEASE ORAL PRN
Status: DISCONTINUED | OUTPATIENT
Start: 2022-08-23 | End: 2022-08-26 | Stop reason: HOSPADM

## 2022-08-23 RX ORDER — BUDESONIDE 0.5 MG/2ML
0.5 INHALANT ORAL 2 TIMES DAILY
Status: DISCONTINUED | OUTPATIENT
Start: 2022-08-24 | End: 2022-08-26 | Stop reason: HOSPADM

## 2022-08-23 RX ORDER — ARFORMOTEROL TARTRATE 15 UG/2ML
15 SOLUTION RESPIRATORY (INHALATION) 2 TIMES DAILY
Status: DISCONTINUED | OUTPATIENT
Start: 2022-08-24 | End: 2022-08-26 | Stop reason: HOSPADM

## 2022-08-23 RX ORDER — AZELASTINE 1 MG/ML
2 SPRAY, METERED NASAL 2 TIMES DAILY
Status: DISCONTINUED | OUTPATIENT
Start: 2022-08-23 | End: 2022-08-23 | Stop reason: CLARIF

## 2022-08-23 RX ORDER — NITROGLYCERIN 0.4 MG/1
0.4 TABLET SUBLINGUAL EVERY 5 MIN PRN
Status: DISCONTINUED | OUTPATIENT
Start: 2022-08-23 | End: 2022-08-26 | Stop reason: HOSPADM

## 2022-08-23 RX ORDER — SODIUM CHLORIDE 0.9 % (FLUSH) 0.9 %
10 SYRINGE (ML) INJECTION PRN
Status: DISCONTINUED | OUTPATIENT
Start: 2022-08-23 | End: 2022-08-26 | Stop reason: HOSPADM

## 2022-08-23 RX ORDER — INSULIN LISPRO 100 [IU]/ML
0-8 INJECTION, SOLUTION INTRAVENOUS; SUBCUTANEOUS EVERY 4 HOURS
Status: DISCONTINUED | OUTPATIENT
Start: 2022-08-23 | End: 2022-08-26 | Stop reason: HOSPADM

## 2022-08-23 RX ORDER — PANTOPRAZOLE SODIUM 40 MG/1
40 TABLET, DELAYED RELEASE ORAL DAILY
Status: DISCONTINUED | OUTPATIENT
Start: 2022-08-24 | End: 2022-08-26 | Stop reason: HOSPADM

## 2022-08-23 RX ORDER — FLUTICASONE PROPIONATE 50 MCG
2 SPRAY, SUSPENSION (ML) NASAL DAILY
Status: DISCONTINUED | OUTPATIENT
Start: 2022-08-24 | End: 2022-08-26 | Stop reason: HOSPADM

## 2022-08-23 RX ORDER — PROMETHAZINE HYDROCHLORIDE 25 MG/1
25 TABLET ORAL EVERY 6 HOURS PRN
Status: DISCONTINUED | OUTPATIENT
Start: 2022-08-23 | End: 2022-08-23

## 2022-08-23 RX ORDER — INSULIN GLARGINE 100 [IU]/ML
20 INJECTION, SOLUTION SUBCUTANEOUS 2 TIMES DAILY
Status: DISCONTINUED | OUTPATIENT
Start: 2022-08-23 | End: 2022-08-26 | Stop reason: HOSPADM

## 2022-08-23 RX ORDER — BUMETANIDE 1 MG/1
1 TABLET ORAL DAILY
Status: DISCONTINUED | OUTPATIENT
Start: 2022-08-24 | End: 2022-08-26 | Stop reason: HOSPADM

## 2022-08-23 RX ORDER — DEXTROSE MONOHYDRATE 100 MG/ML
INJECTION, SOLUTION INTRAVENOUS CONTINUOUS PRN
Status: DISCONTINUED | OUTPATIENT
Start: 2022-08-23 | End: 2022-08-26 | Stop reason: HOSPADM

## 2022-08-23 RX ORDER — DEXTROSE AND SODIUM CHLORIDE 5; .9 G/100ML; G/100ML
INJECTION, SOLUTION INTRAVENOUS CONTINUOUS
Status: DISCONTINUED | OUTPATIENT
Start: 2022-08-24 | End: 2022-08-24

## 2022-08-23 RX ORDER — KETOROLAC TROMETHAMINE 30 MG/ML
15 INJECTION, SOLUTION INTRAMUSCULAR; INTRAVENOUS EVERY 6 HOURS
Status: DISCONTINUED | OUTPATIENT
Start: 2022-08-23 | End: 2022-08-25

## 2022-08-23 RX ORDER — DIPHENHYDRAMINE HYDROCHLORIDE 50 MG/ML
25 INJECTION INTRAMUSCULAR; INTRAVENOUS ONCE
Status: COMPLETED | OUTPATIENT
Start: 2022-08-23 | End: 2022-08-23

## 2022-08-23 RX ORDER — FLUTICASONE FUROATE AND VILANTEROL TRIFENATATE 100; 25 UG/1; UG/1
1 POWDER RESPIRATORY (INHALATION) DAILY
COMMUNITY
Start: 2022-08-11

## 2022-08-23 RX ORDER — ONDANSETRON 2 MG/ML
4 INJECTION INTRAMUSCULAR; INTRAVENOUS EVERY 6 HOURS PRN
Status: DISCONTINUED | OUTPATIENT
Start: 2022-08-23 | End: 2022-08-23

## 2022-08-23 RX ORDER — ONDANSETRON 4 MG/1
4 TABLET, ORALLY DISINTEGRATING ORAL EVERY 8 HOURS PRN
Status: DISCONTINUED | OUTPATIENT
Start: 2022-08-23 | End: 2022-08-23

## 2022-08-23 RX ORDER — FLUTICASONE FUROATE AND VILANTEROL 100; 25 UG/1; UG/1
1 POWDER RESPIRATORY (INHALATION) DAILY
Status: DISCONTINUED | OUTPATIENT
Start: 2022-08-24 | End: 2022-08-23 | Stop reason: CLARIF

## 2022-08-23 RX ORDER — DOCUSATE SODIUM 100 MG/1
200 CAPSULE, LIQUID FILLED ORAL NIGHTLY
Status: DISCONTINUED | OUTPATIENT
Start: 2022-08-23 | End: 2022-08-26 | Stop reason: HOSPADM

## 2022-08-23 RX ORDER — METOCLOPRAMIDE HYDROCHLORIDE 5 MG/ML
10 INJECTION INTRAMUSCULAR; INTRAVENOUS ONCE
Status: COMPLETED | OUTPATIENT
Start: 2022-08-23 | End: 2022-08-23

## 2022-08-23 RX ORDER — FENTANYL CITRATE 50 UG/ML
50 INJECTION, SOLUTION INTRAMUSCULAR; INTRAVENOUS ONCE
Status: COMPLETED | OUTPATIENT
Start: 2022-08-23 | End: 2022-08-23

## 2022-08-23 RX ORDER — DEXAMETHASONE SODIUM PHOSPHATE 10 MG/ML
10 INJECTION INTRAMUSCULAR; INTRAVENOUS ONCE
Status: COMPLETED | OUTPATIENT
Start: 2022-08-23 | End: 2022-08-23

## 2022-08-23 RX ORDER — GABAPENTIN 100 MG/1
100 CAPSULE ORAL 2 TIMES DAILY
Status: DISCONTINUED | OUTPATIENT
Start: 2022-08-23 | End: 2022-08-26 | Stop reason: HOSPADM

## 2022-08-23 RX ORDER — SODIUM CHLORIDE 9 MG/ML
INJECTION, SOLUTION INTRAVENOUS PRN
Status: DISCONTINUED | OUTPATIENT
Start: 2022-08-23 | End: 2022-08-26 | Stop reason: HOSPADM

## 2022-08-23 RX ORDER — MONTELUKAST SODIUM 10 MG/1
10 TABLET ORAL NIGHTLY
Status: DISCONTINUED | OUTPATIENT
Start: 2022-08-23 | End: 2022-08-26 | Stop reason: HOSPADM

## 2022-08-23 RX ORDER — DULOXETIN HYDROCHLORIDE 60 MG/1
60 CAPSULE, DELAYED RELEASE ORAL 2 TIMES DAILY
Status: DISCONTINUED | OUTPATIENT
Start: 2022-08-23 | End: 2022-08-26 | Stop reason: HOSPADM

## 2022-08-23 RX ORDER — REVEFENACIN 175 UG/3ML
3 SOLUTION RESPIRATORY (INHALATION) DAILY
COMMUNITY
Start: 2022-06-08

## 2022-08-23 RX ORDER — FAMOTIDINE 20 MG/1
40 TABLET, FILM COATED ORAL
Status: DISCONTINUED | OUTPATIENT
Start: 2022-08-23 | End: 2022-08-26 | Stop reason: HOSPADM

## 2022-08-23 RX ORDER — SODIUM CHLORIDE, SODIUM LACTATE, POTASSIUM CHLORIDE, CALCIUM CHLORIDE 600; 310; 30; 20 MG/100ML; MG/100ML; MG/100ML; MG/100ML
INJECTION, SOLUTION INTRAVENOUS CONTINUOUS
Status: DISCONTINUED | OUTPATIENT
Start: 2022-08-23 | End: 2022-08-24

## 2022-08-23 RX ADMIN — DEXAMETHASONE SODIUM PHOSPHATE 10 MG: 10 INJECTION INTRAMUSCULAR; INTRAVENOUS at 16:14

## 2022-08-23 RX ADMIN — DIPHENHYDRAMINE HYDROCHLORIDE 25 MG: 50 INJECTION, SOLUTION INTRAMUSCULAR; INTRAVENOUS at 16:13

## 2022-08-23 RX ADMIN — INSULIN GLARGINE 20 UNITS: 100 INJECTION, SOLUTION SUBCUTANEOUS at 23:58

## 2022-08-23 RX ADMIN — INSULIN LISPRO 8 UNITS: 100 INJECTION, SOLUTION INTRAVENOUS; SUBCUTANEOUS at 23:58

## 2022-08-23 RX ADMIN — METOCLOPRAMIDE 10 MG: 5 INJECTION, SOLUTION INTRAMUSCULAR; INTRAVENOUS at 21:38

## 2022-08-23 RX ADMIN — FENTANYL CITRATE 50 MCG: 50 INJECTION, SOLUTION INTRAMUSCULAR; INTRAVENOUS at 21:38

## 2022-08-23 RX ADMIN — DIPHENHYDRAMINE HYDROCHLORIDE 25 MG: 50 INJECTION, SOLUTION INTRAMUSCULAR; INTRAVENOUS at 21:39

## 2022-08-23 RX ADMIN — MEROPENEM 1000 MG: 1 INJECTION, POWDER, FOR SOLUTION INTRAVENOUS at 22:44

## 2022-08-23 RX ADMIN — IPRATROPIUM BROMIDE AND ALBUTEROL SULFATE 3 AMPULE: 2.5; .5 SOLUTION RESPIRATORY (INHALATION) at 16:17

## 2022-08-23 ASSESSMENT — ENCOUNTER SYMPTOMS
RHINORRHEA: 1
EYE DISCHARGE: 0
EYE PAIN: 0
SHORTNESS OF BREATH: 1
SORE THROAT: 0
SINUS PRESSURE: 0
DIARRHEA: 0
COUGH: 1
BACK PAIN: 0
ABDOMINAL PAIN: 1
NAUSEA: 0
VOMITING: 0
WHEEZING: 0

## 2022-08-23 ASSESSMENT — PAIN SCALES - GENERAL
PAINLEVEL_OUTOF10: 9
PAINLEVEL_OUTOF10: 10

## 2022-08-23 ASSESSMENT — PAIN DESCRIPTION - LOCATION
LOCATION: ABDOMEN
LOCATION: ABDOMEN

## 2022-08-23 ASSESSMENT — PAIN - FUNCTIONAL ASSESSMENT: PAIN_FUNCTIONAL_ASSESSMENT: 0-10

## 2022-08-23 NOTE — PROGRESS NOTES
Admission database completed to best of this RN's ability. Care plan and education initiated. Pt from home alone. Wears Trilogy when sleeping. Uses walker with ambulation. Has nebulizer, glucometer, and diabetic supplies at home. Denies any [place] [address] services prior to admission.

## 2022-08-23 NOTE — ED PROVIDER NOTES
39-year-old female presenting to the emergency department for shortness of breath and postop pain. History of COPD, feels as if she is having a flare, was wheezing for EMS, given breathing treatments with improvement of wheezing, symptoms gradual onset, persistent, associate with abdominal pain. Recent hernia repair by Dr. Ovidio Chen and is having pain and protrusion around the area. Symptoms moderate in severity, gradual onset, persistent, improved with breathing treatments, worsened by time, shortness of breath associate with abdominal pain. Denies any history of prior blood clots, does have a history of heart failure as well as hypertensive emergency. Review of Systems   Constitutional:  Positive for chills. Negative for fever. HENT:  Positive for rhinorrhea. Negative for ear pain, sinus pressure and sore throat. Eyes:  Negative for pain and discharge. Respiratory:  Positive for cough and shortness of breath. Negative for wheezing. Cardiovascular:  Negative for chest pain. Gastrointestinal:  Positive for abdominal pain. Negative for diarrhea, nausea and vomiting. Genitourinary:  Negative for dysuria and frequency. Musculoskeletal:  Positive for arthralgias and myalgias. Negative for back pain. Skin:  Positive for wound (Incision from recent hernia repair). Negative for rash. Neurological:  Positive for headaches. Negative for weakness. Hematological:  Negative for adenopathy. All other systems reviewed and are negative. Physical Exam  Vitals and nursing note reviewed. Constitutional:       Appearance: Normal appearance. She is obese. HENT:      Head: Normocephalic and atraumatic. Right Ear: External ear normal.      Left Ear: External ear normal.      Nose: Nose normal.      Mouth/Throat:      Mouth: Mucous membranes are moist.   Eyes:      Extraocular Movements: Extraocular movements intact. Pupils: Pupils are equal, round, and reactive to light. Cardiovascular:      Rate and Rhythm: Normal rate and regular rhythm. Pulses: Normal pulses. Heart sounds: Normal heart sounds. Pulmonary:      Effort: Pulmonary effort is normal.      Breath sounds: Wheezing present. Comments: Diminished, expiratory wheezes at the bases  Abdominal:      General: Abdomen is flat. Bowel sounds are normal.      Palpations: Abdomen is soft. Hernia: A hernia (Bruising over hernia repair site, area distention around the hernia) is present. Musculoskeletal:         General: Normal range of motion. Cervical back: Normal range of motion and neck supple. Skin:     General: Skin is warm and dry. Neurological:      General: No focal deficit present. Mental Status: She is alert and oriented to person, place, and time. Cranial Nerves: No cranial nerve deficit. Sensory: No sensory deficit. Motor: No weakness. Procedures     MDM     Amount and/or Complexity of Data Reviewed  Clinical lab tests: reviewed  Tests in the radiology section of CPT®: reviewed  Tests in the medicine section of CPT®: reviewed         ED Course as of 08/23/22 1834   Tue Aug 23, 2022   [address] [place], [place] [address]:     I have personally performed and/or participated in the history, exam, medical decision making, and procedures and agree with all pertinent clinical information unless otherwise noted. I have also reviewed and agree with the past medical, family and social history unless otherwise noted. I have discussed this patient in detail with the resident, and provided the instruction and education regarding the patient. My findings/plan: This is a 77-year-old female with history of CAD, COPD using trilogy at night, DM, HTN, HLD, CKD, CHF (Dr. Elsie Sanchez cardiology) recent hiatal hernia repair with Dr. Astrid Doyle surgery presents for evaluation of abdominal discomfort and shortness of breath.   Patient has not had a follow-up appoint with Dr. Elisha Dorantes just yet, she does have some ecchymosis near her umbilicus. She states for last 3 to 4 days she is feeling more short of breath. She has been using her breathing treatments without any improvement. Note she has had a cough that is productive of clear mucus. Denies any fevers or chills. She is still smoking. On exam she is lying in bed no acute distress. Nasal cannula in place. Heart regular rate and rhythm  Lungs with crackles at bilateral bases  Abdomen is soft, nondistended, tenderness throughout. There is ecchymosis near umbilicus. There is no pitting edema the bilateral lower extremities. Plan for labs, imaging, supportive care. [BB]   1601 Chest x-ray appears to be similar compared to previous with some small pleural effusions and pulmonary congestion, not appear to be significantly fluid overloaded on exam [JG]   1815 CT shows a postop abscess, given Vanco and Deedee, consulted patient's surgeon [JG]   [address] [facility] Dr. Reema Alexander will admit patient [JG]   [phone] 70-year-old female presenting to the emergency department for shortness of breath, had a hernia repair on the 11th, came in due to increasing abdominal distention and pain in that area, some shortness of breath at well, as it is difficult to breathe due to the pain. Wheezing for EMS, she is given duo nebs by them in route, still had some expiratory wheezes, possible CHF seen on chest x-ray, however patient not appear to be clinically fluid overloaded. Felt as if her shortness of breath more likely related to his COPD exacerbation, patient felt as well as if her COPD was flaring up. CT abdomen pelvis showed a area of abscess, postop, she was loaded with vancomycin and meropenem, admitted to general surgery. [JG]   1833 EKG: This EKG is signed by emergency department physician.     Rate: 94  Rhythm: Sinus  Interpretation: non-specific EKG  Comparison: Stable as compared to previous EKG     [JG]      ED Course User Index  [BB] Scot Madera DO  [JG] Angela Byrd Stacy Mckeon MD      18-year-old female presenting to the emergency department for shortness of breath, had a hernia repair on the 11th, came in due to increasing abdominal distention and pain in that area, some shortness of breath at well, as it is difficult to breathe due to the pain. Wheezing for EMS, she is given duo nebs by them in route, still had some expiratory wheezes, possible CHF seen on chest x-ray, however patient not appear to be clinically fluid overloaded. Felt as if her shortness of breath more likely related to his COPD exacerbation, patient felt as well as if her COPD was flaring up. CT abdomen pelvis showed a area of abscess, postop, she was loaded with vancomycin and meropenem, admitted to general surgery. ED Course as of 08/23/22 1834   Tue Aug 23, 2022   1601 ATTENDING PROVIDER ATTESTATION:     I have personally performed and/or participated in the history, exam, medical decision making, and procedures and agree with all pertinent clinical information unless otherwise noted. I have also reviewed and agree with the past medical, family and social history unless otherwise noted. I have discussed this patient in detail with the resident, and provided the instruction and education regarding the patient. My findings/plan: This is a 18-year-old female with history of CAD, COPD using trilogy at night, DM, HTN, HLD, CKD, CHF (Dr. [facility] cardiology) recent hiatal hernia repair with Dr. Bebeto Casiano surgery presents for evaluation of abdominal discomfort and shortness of breath. Patient has not had a follow-up appoint with Dr. Makayla Ramsay just yet, she does have some ecchymosis near her umbilicus. She states for last 3 to 4 days she is feeling more short of breath. She has been using her breathing treatments without any improvement. Note she has had a cough that is productive of clear mucus. Denies any fevers or chills. She is still smoking. On exam she is lying in bed no acute distress.   Nasal cannula in place. Heart regular rate and rhythm  Lungs with crackles at bilateral bases  Abdomen is soft, nondistended, tenderness throughout. There is ecchymosis near umbilicus. There is no pitting edema the bilateral lower extremities. Plan for labs, imaging, supportive care. [BB]   1601 Chest x-ray appears to be similar compared to previous with some small pleural effusions and pulmonary congestion, not appear to be significantly fluid overloaded on exam [JG]   1815 CT shows a postop abscess, given Vanco and Merrem, consulted patient's surgeon [JG]   [address] [facility] Dr. Albino Viramontes will admit patient [JG]   [phone] 79-year-old female presenting to the emergency department for shortness of breath, had a hernia repair on the 11th, came in due to increasing abdominal distention and pain in that area, some shortness of breath at well, as it is difficult to breathe due to the pain. Wheezing for EMS, she is given duo nebs by them in route, still had some expiratory wheezes, possible CHF seen on chest x-ray, however patient not appear to be clinically fluid overloaded. Felt as if her shortness of breath more likely related to his COPD exacerbation, patient felt as well as if her COPD was flaring up. CT abdomen pelvis showed a area of abscess, postop, she was loaded with vancomycin and meropenem, admitted to general surgery. [JG]   1833 EKG: This EKG is signed by emergency department physician.     Rate: 94  Rhythm: Sinus  Interpretation: non-specific EKG  Comparison: Stable as compared to previous EKG     [JG]      ED Course User Index  [BB] Anum Cárdenas DO  [JG] Latosha Lozano MD       --------------------------------------------- PAST HISTORY ---------------------------------------------  Past Medical History:  has a past medical history of Asthma, Atherosclerosis of native artery of left leg with rest pain ([facility] [address] CAD (coronary artery disease), Cellulitis and abscess of trunk, Cerebellar infarct ([address] Chronic back pain, Chronic kidney disease, Chronic systolic congestive heart failure (HCC), Chronic venous insufficiency, COPD (chronic obstructive pulmonary disease) ([address] COVID-19, Depression, Diabetes mellitus ([address] Diabetic neuropathy ([address] Diverticulosis, Fatty liver, Glaucoma, open angle, Hepatic encephalopathy ([address] Hiatal hernia, History of blood transfusion, Hyperlipidemia, Hyperplastic colon polyp, Hypertension, Incontinence, Liver mass, Morbid obesity ([address] Movement disorder, Myocardial infarction ([address] O2 dependent, Osteoarthritis, generalized, Pain in both lower legs, Peripheral vascular disease ([address] Pneumonia, Pulmonary edema, PVD (peripheral vascular disease) with claudication ([address] Sleep apnea, Status post peripheral artery angioplasty, Tobacco abuse, Tobacco abuse, Tobacco dependence, Tubular adenoma polyp of rectum, Urinary tract infection due to ESBL Klebsiella, and Ventral hernia. Past Surgical History:  has a past surgical history that includes knee surgery; Upper gastrointestinal endoscopy (12/20/12); Coronary artery bypass graft; Layered wound closure (Left, [id number]); Echo Complete (10/9/2013); polysomnography (1/2016); liver biopsy (1/8/2016); bronchial brush biopsy (1/25/2013); Breast surgery (2000); Cholecystectomy (YRS AGO); Cardiac surgery (12/2011); Cardiac catheterization (04/20/2015); Hysterectomy; joint replacement (2011); Upper gastrointestinal endoscopy (12/23/2016); Colonoscopy (11/15/2013); Colonoscopy (12/23/2016); Upper gastrointestinal endoscopy (02/08/2017); Endoscopy, colon, diagnostic (04/18/2017); Gallbladder surgery; angioplasty (11/13/2018); and laryngoscopy (N/A, 4/4/2022). Social History:  reports that she has been smoking cigarettes. She started smoking about 48 years ago. She has a 10.00 pack-year smoking history. She has never used smokeless tobacco. She reports that she does not currently use alcohol. She reports current drug use. Drug: Marijuana [place]).     Family Creatinine 1.3 (H) 0.5 - 1.0 mg/dL    GFR Non-African American 49 >=60 mL/min/1.73    GFR African American 49     Calcium 9.1 8.6 - 10.2 mg/dL   Hepatic Function Panel   Result Value Ref Range    Total Protein 7.1 6.4 - 8.3 g/dL    Albumin 3.8 3.5 - 5.2 g/dL    Alkaline Phosphatase 75 35 - 104 U/L    ALT 15 0 - 32 U/L    AST 11 0 - 31 U/L    Total Bilirubin 0.3 0.0 - 1.2 mg/dL    Bilirubin, Direct <0.2 0.0 - 0.3 mg/dL    Bilirubin, Indirect see below 0.0 - 1.0 mg/dL   Troponin   Result Value Ref Range    Troponin, High Sensitivity 14 (H) 0 - 9 ng/L   Brain Natriuretic Peptide   Result Value Ref Range    Pro- (H) 0 - 125 pg/mL   Protime-INR   Result Value Ref Range    Protime 11.5 9.3 - 12.4 sec    INR 1.0    Lactic Acid   Result Value Ref Range    Lactic Acid 1.2 0.5 - 2.2 mmol/L   POCT Glucose   Result Value Ref Range    Meter Glucose 115 (H) 74 - 99 mg/dL   EKG 12 Lead   Result Value Ref Range    Ventricular Rate 94 BPM    Atrial Rate 94 BPM    P-R Interval 170 ms    QRS Duration 140 ms    Q-T Interval 420 ms    QTc Calculation (Bazett) 525 ms    P Axis 59 degrees    R Axis 43 degrees    T Axis 53 degrees       RADIOLOGY:  CT ABDOMEN PELVIS WO CONTRAST Additional Contrast? None   Final Result   6.5 cm x 6.5 cm x 3.5 cm gas and fluid containing thick-walled collection in   the ventral abdominal wall subcutaneous fat to the right of midline   consistent with abscess and adjacent fat containing hernia. .         XR CHEST PORTABLE   Final Result   Early CHF.             ------------------------- NURSING NOTES AND VITALS REVIEWED ---------------------------  Date / Time Roomed:  8/23/2022  3:33 PM  ED Bed Assignment:  12/12    The nursing notes within the ED encounter and vital signs as below have been reviewed.      Patient Vitals for the past 24 hrs:   BP Temp Temp src Pulse Resp SpO2 Height Weight   08/23/22 1621 -- -- -- 96 -- 100 % -- --   08/23/22 1620 -- -- -- 89 18 95 % -- --   08/23/22 1603 (!) 165/88 98.8 °F (37.1 °C) Oral 92 20 98 % 5' 1\" (1.549 m) 214 lb (97.1 kg)       Oxygen Saturation Interpretation: Improved after treatment    ------------------------------------------ PROGRESS NOTES ------------------------------------------      Counseling:  I have spoken with the patient and discussed todays results, in addition to providing specific details for the plan of care and counseling regarding the diagnosis and prognosis. Their questions are answered at this time and they are agreeable with the plan of admission.    --------------------------------- ADDITIONAL PROVIDER NOTES ---------------------------------  Consultations:  Time: 1827. Spoke with Dr. Sarbjit Swan. Discussed case. They will admit the patient. This patient's ED course included: a personal history and physicial examination, re-evaluation prior to disposition, multiple bedside re-evaluations, IV medications, cardiac monitoring, and continuous pulse oximetry    This patient has remained hemodynamically stable during their ED course. Diagnosis:  1. Postoperative abscess    2. COPD exacerbation ([facility] [address]        Disposition:  Patient's disposition: Admit to telemetry  Patient's condition is stable.            Kassandra Nunez MD  Resident  08/23/22 6815

## 2022-08-23 NOTE — ED NOTES
Pt states that she had surgery on the 8/11 for hernia repair and she has had an increase in shortness of breath, she has not been walking much, she has tight I and e wheezes, prod cough for thick clear sputum, abdomen is tender to touch she has a lot of bruising to her lower abdominal area, she states that she has pressure  And pain in her lower abdominal area, 0 edema pedal pulses plus 2 bilateral.      Anabella Strickland RN  08/23/22 4688

## 2022-08-24 LAB
ALBUMIN SERPL-MCNC: 3.3 G/DL (ref 3.5–5.2)
ALP BLD-CCNC: 74 U/L (ref 35–104)
ALT SERPL-CCNC: 15 U/L (ref 0–32)
ANION GAP SERPL CALCULATED.3IONS-SCNC: 10 MMOL/L (ref 7–16)
AST SERPL-CCNC: 9 U/L (ref 0–31)
BASOPHILS ABSOLUTE: 0 E9/L (ref 0–0.2)
BASOPHILS RELATIVE PERCENT: 0 % (ref 0–2)
BILIRUB SERPL-MCNC: <0.2 MG/DL (ref 0–1.2)
BUN BLDV-MCNC: 27 MG/DL (ref 6–23)
C-REACTIVE PROTEIN: 1.1 MG/DL (ref 0–0.4)
CALCIUM SERPL-MCNC: 8.7 MG/DL (ref 8.6–10.2)
CHLORIDE BLD-SCNC: 102 MMOL/L (ref 98–107)
CO2: 25 MMOL/L (ref 22–29)
CREAT SERPL-MCNC: 1.3 MG/DL (ref 0.5–1)
EKG ATRIAL RATE: 94 BPM
EKG P AXIS: 59 DEGREES
EKG P-R INTERVAL: 170 MS
EKG Q-T INTERVAL: 420 MS
EKG QRS DURATION: 140 MS
EKG QTC CALCULATION (BAZETT): 525 MS
EKG R AXIS: 43 DEGREES
EKG T AXIS: 53 DEGREES
EKG VENTRICULAR RATE: 94 BPM
EOSINOPHILS ABSOLUTE: 0 E9/L (ref 0.05–0.5)
EOSINOPHILS RELATIVE PERCENT: 0 % (ref 0–6)
GFR AFRICAN AMERICAN: 49
GFR NON-AFRICAN AMERICAN: 49 ML/MIN/1.73
GLUCOSE BLD-MCNC: 272 MG/DL (ref 74–99)
HBA1C MFR BLD: 6.7 % (ref 4–5.6)
HCT VFR BLD CALC: 38.3 % (ref 34–48)
HEMOGLOBIN: 12 G/DL (ref 11.5–15.5)
LYMPHOCYTES ABSOLUTE: 0.29 E9/L (ref 1.5–4)
LYMPHOCYTES RELATIVE PERCENT: 3 % (ref 20–42)
MCH RBC QN AUTO: 31.3 PG (ref 26–35)
MCHC RBC AUTO-ENTMCNC: 31.3 % (ref 32–34.5)
MCV RBC AUTO: 100 FL (ref 80–99.9)
METAMYELOCYTES RELATIVE PERCENT: 1 % (ref 0–1)
METER GLUCOSE: 119 MG/DL (ref 74–99)
METER GLUCOSE: 165 MG/DL (ref 74–99)
METER GLUCOSE: 204 MG/DL (ref 74–99)
METER GLUCOSE: 206 MG/DL (ref 74–99)
METER GLUCOSE: 304 MG/DL (ref 74–99)
MONOCYTES ABSOLUTE: 0.1 E9/L (ref 0.1–0.95)
MONOCYTES RELATIVE PERCENT: 1 % (ref 2–12)
NEUTROPHILS ABSOLUTE: 9.22 E9/L (ref 1.8–7.3)
NEUTROPHILS RELATIVE PERCENT: 95 % (ref 43–80)
NUCLEATED RED BLOOD CELLS: 0 /100 WBC
PDW BLD-RTO: 12.9 FL (ref 11.5–15)
PLATELET # BLD: 298 E9/L (ref 130–450)
PMV BLD AUTO: 10.2 FL (ref 7–12)
POLYCHROMASIA: ABNORMAL
POTASSIUM REFLEX MAGNESIUM: 5.2 MMOL/L (ref 3.5–5)
POTASSIUM SERPL-SCNC: 5.2 MMOL/L (ref 3.5–5)
PROCALCITONIN: 0.04 NG/ML (ref 0–0.08)
RBC # BLD: 3.83 E12/L (ref 3.5–5.5)
ROULEAUX: ABNORMAL
SEDIMENTATION RATE, ERYTHROCYTE: 60 MM/HR (ref 0–20)
SODIUM BLD-SCNC: 137 MMOL/L (ref 132–146)
TOTAL PROTEIN: 6.7 G/DL (ref 6.4–8.3)
WBC # BLD: 9.6 E9/L (ref 4.5–11.5)

## 2022-08-24 PROCEDURE — 86140 C-REACTIVE PROTEIN: CPT

## 2022-08-24 PROCEDURE — 82962 GLUCOSE BLOOD TEST: CPT

## 2022-08-24 PROCEDURE — 2580000003 HC RX 258: Performed by: NURSE PRACTITIONER

## 2022-08-24 PROCEDURE — 2060000000 HC ICU INTERMEDIATE R&B

## 2022-08-24 PROCEDURE — 36415 COLL VENOUS BLD VENIPUNCTURE: CPT

## 2022-08-24 PROCEDURE — 97161 PT EVAL LOW COMPLEX 20 MIN: CPT

## 2022-08-24 PROCEDURE — 94660 CPAP INITIATION&MGMT: CPT

## 2022-08-24 PROCEDURE — 94640 AIRWAY INHALATION TREATMENT: CPT

## 2022-08-24 PROCEDURE — 83036 HEMOGLOBIN GLYCOSYLATED A1C: CPT

## 2022-08-24 PROCEDURE — 85651 RBC SED RATE NONAUTOMATED: CPT

## 2022-08-24 PROCEDURE — 84145 PROCALCITONIN (PCT): CPT

## 2022-08-24 PROCEDURE — 97165 OT EVAL LOW COMPLEX 30 MIN: CPT

## 2022-08-24 PROCEDURE — 6360000002 HC RX W HCPCS: Performed by: NURSE PRACTITIONER

## 2022-08-24 PROCEDURE — 2580000003 HC RX 258: Performed by: SURGERY

## 2022-08-24 PROCEDURE — 6370000000 HC RX 637 (ALT 250 FOR IP): Performed by: STUDENT IN AN ORGANIZED HEALTH CARE EDUCATION/TRAINING PROGRAM

## 2022-08-24 PROCEDURE — 85025 COMPLETE CBC W/AUTO DIFF WBC: CPT

## 2022-08-24 PROCEDURE — 80053 COMPREHEN METABOLIC PANEL: CPT

## 2022-08-24 PROCEDURE — 80048 BASIC METABOLIC PNL TOTAL CA: CPT

## 2022-08-24 PROCEDURE — 6360000002 HC RX W HCPCS: Performed by: SURGERY

## 2022-08-24 PROCEDURE — 2700000000 HC OXYGEN THERAPY PER DAY

## 2022-08-24 PROCEDURE — 6370000000 HC RX 637 (ALT 250 FOR IP): Performed by: NURSE PRACTITIONER

## 2022-08-24 RX ORDER — METHOCARBAMOL 500 MG/1
500 TABLET, FILM COATED ORAL 3 TIMES DAILY PRN
Status: DISCONTINUED | OUTPATIENT
Start: 2022-08-24 | End: 2022-08-24

## 2022-08-24 RX ORDER — TRAMADOL HYDROCHLORIDE 50 MG/1
100 TABLET ORAL EVERY 6 HOURS PRN
Status: DISCONTINUED | OUTPATIENT
Start: 2022-08-24 | End: 2022-08-26

## 2022-08-24 RX ORDER — METHOCARBAMOL 500 MG/1
1000 TABLET, FILM COATED ORAL 4 TIMES DAILY
Status: DISCONTINUED | OUTPATIENT
Start: 2022-08-24 | End: 2022-08-26 | Stop reason: HOSPADM

## 2022-08-24 RX ORDER — TRAMADOL HYDROCHLORIDE 50 MG/1
50 TABLET ORAL EVERY 6 HOURS PRN
Status: DISCONTINUED | OUTPATIENT
Start: 2022-08-24 | End: 2022-08-26

## 2022-08-24 RX ORDER — ACETAMINOPHEN 325 MG/1
650 TABLET ORAL EVERY 4 HOURS PRN
Status: DISCONTINUED | OUTPATIENT
Start: 2022-08-24 | End: 2022-08-26 | Stop reason: HOSPADM

## 2022-08-24 RX ADMIN — INSULIN GLARGINE 20 UNITS: 100 INJECTION, SOLUTION SUBCUTANEOUS at 20:23

## 2022-08-24 RX ADMIN — METHOCARBAMOL TABLETS 1000 MG: 500 TABLET, COATED ORAL at 20:05

## 2022-08-24 RX ADMIN — SACUBITRIL AND VALSARTAN 1 TABLET: 24; 26 TABLET, FILM COATED ORAL at 07:46

## 2022-08-24 RX ADMIN — MONTELUKAST SODIUM 10 MG: 10 TABLET ORAL at 00:04

## 2022-08-24 RX ADMIN — DULOXETINE HYDROCHLORIDE 60 MG: 60 CAPSULE, DELAYED RELEASE ORAL at 07:47

## 2022-08-24 RX ADMIN — ARFORMOTEROL TARTRATE 15 MCG: 15 SOLUTION RESPIRATORY (INHALATION) at 20:46

## 2022-08-24 RX ADMIN — SODIUM CHLORIDE 3000 MG: 900 INJECTION INTRAVENOUS at 07:57

## 2022-08-24 RX ADMIN — GABAPENTIN 100 MG: 100 CAPSULE ORAL at 20:04

## 2022-08-24 RX ADMIN — Medication 1000 UNITS: at 07:47

## 2022-08-24 RX ADMIN — METHOCARBAMOL TABLETS 1000 MG: 500 TABLET, COATED ORAL at 16:16

## 2022-08-24 RX ADMIN — ACETAMINOPHEN 650 MG: 325 TABLET ORAL at 10:43

## 2022-08-24 RX ADMIN — CARVEDILOL 25 MG: 25 TABLET, FILM COATED ORAL at 07:47

## 2022-08-24 RX ADMIN — GABAPENTIN 100 MG: 100 CAPSULE ORAL at 07:47

## 2022-08-24 RX ADMIN — HYDRALAZINE HYDROCHLORIDE 50 MG: 50 TABLET, FILM COATED ORAL at 07:47

## 2022-08-24 RX ADMIN — FAMOTIDINE 40 MG: 20 TABLET ORAL at 00:04

## 2022-08-24 RX ADMIN — SACUBITRIL AND VALSARTAN 1 TABLET: 24; 26 TABLET, FILM COATED ORAL at 20:06

## 2022-08-24 RX ADMIN — MONTELUKAST SODIUM 10 MG: 10 TABLET ORAL at 20:05

## 2022-08-24 RX ADMIN — TRAZODONE HYDROCHLORIDE 100 MG: 50 TABLET ORAL at 20:04

## 2022-08-24 RX ADMIN — BUDESONIDE 500 MCG: 0.5 SUSPENSION RESPIRATORY (INHALATION) at 20:46

## 2022-08-24 RX ADMIN — INSULIN GLARGINE 20 UNITS: 100 INJECTION, SOLUTION SUBCUTANEOUS at 07:47

## 2022-08-24 RX ADMIN — ACETAMINOPHEN 650 MG: 325 TABLET ORAL at 17:40

## 2022-08-24 RX ADMIN — INSULIN LISPRO 2 UNITS: 100 INJECTION, SOLUTION INTRAVENOUS; SUBCUTANEOUS at 16:17

## 2022-08-24 RX ADMIN — SODIUM CHLORIDE 3000 MG: 900 INJECTION INTRAVENOUS at 01:46

## 2022-08-24 RX ADMIN — CARVEDILOL 25 MG: 25 TABLET, FILM COATED ORAL at 16:16

## 2022-08-24 RX ADMIN — DULOXETINE HYDROCHLORIDE 60 MG: 60 CAPSULE, DELAYED RELEASE ORAL at 00:04

## 2022-08-24 RX ADMIN — DULOXETINE HYDROCHLORIDE 60 MG: 60 CAPSULE, DELAYED RELEASE ORAL at 20:04

## 2022-08-24 RX ADMIN — KETOROLAC TROMETHAMINE 15 MG: 30 INJECTION, SOLUTION INTRAMUSCULAR at 06:42

## 2022-08-24 RX ADMIN — HYDRALAZINE HYDROCHLORIDE 50 MG: 50 TABLET, FILM COATED ORAL at 13:27

## 2022-08-24 RX ADMIN — SODIUM CHLORIDE 3000 MG: 900 INJECTION INTRAVENOUS at 20:17

## 2022-08-24 RX ADMIN — INSULIN LISPRO 2 UNITS: 100 INJECTION, SOLUTION INTRAVENOUS; SUBCUTANEOUS at 06:42

## 2022-08-24 RX ADMIN — Medication 10 ML: at 20:17

## 2022-08-24 RX ADMIN — SODIUM CHLORIDE 3000 MG: 900 INJECTION INTRAVENOUS at 13:29

## 2022-08-24 RX ADMIN — ISOSORBIDE MONONITRATE 60 MG: 60 TABLET ORAL at 07:47

## 2022-08-24 RX ADMIN — GABAPENTIN 100 MG: 100 CAPSULE ORAL at 00:04

## 2022-08-24 RX ADMIN — HYDRALAZINE HYDROCHLORIDE 50 MG: 50 TABLET, FILM COATED ORAL at 00:04

## 2022-08-24 RX ADMIN — DOCUSATE SODIUM 200 MG: 100 CAPSULE, LIQUID FILLED ORAL at 20:03

## 2022-08-24 RX ADMIN — KETOROLAC TROMETHAMINE 15 MG: 30 INJECTION, SOLUTION INTRAMUSCULAR at 00:04

## 2022-08-24 RX ADMIN — CETIRIZINE HYDROCHLORIDE 10 MG: 10 TABLET, FILM COATED ORAL at 07:47

## 2022-08-24 RX ADMIN — DOCUSATE SODIUM 200 MG: 100 CAPSULE, LIQUID FILLED ORAL at 00:04

## 2022-08-24 RX ADMIN — HYDRALAZINE HYDROCHLORIDE 50 MG: 50 TABLET, FILM COATED ORAL at 20:05

## 2022-08-24 RX ADMIN — BUDESONIDE 500 MCG: 0.5 SUSPENSION RESPIRATORY (INHALATION) at 09:34

## 2022-08-24 RX ADMIN — PANTOPRAZOLE SODIUM 40 MG: 40 TABLET, DELAYED RELEASE ORAL at 07:47

## 2022-08-24 RX ADMIN — TRAMADOL HYDROCHLORIDE 100 MG: 50 TABLET, COATED ORAL at 13:26

## 2022-08-24 RX ADMIN — BUMETANIDE 1 MG: 1 TABLET ORAL at 07:47

## 2022-08-24 RX ADMIN — INSULIN LISPRO 6 UNITS: 100 INJECTION, SOLUTION INTRAVENOUS; SUBCUTANEOUS at 03:41

## 2022-08-24 RX ADMIN — ARFORMOTEROL TARTRATE 15 MCG: 15 SOLUTION RESPIRATORY (INHALATION) at 09:34

## 2022-08-24 RX ADMIN — SODIUM CHLORIDE, POTASSIUM CHLORIDE, SODIUM LACTATE AND CALCIUM CHLORIDE: 600; 310; 30; 20 INJECTION, SOLUTION INTRAVENOUS at 01:45

## 2022-08-24 RX ADMIN — TRAZODONE HYDROCHLORIDE 100 MG: 50 TABLET ORAL at 00:04

## 2022-08-24 RX ADMIN — FAMOTIDINE 40 MG: 20 TABLET ORAL at 16:16

## 2022-08-24 RX ADMIN — FLUTICASONE PROPIONATE 2 SPRAY: 50 SPRAY, METERED NASAL at 07:46

## 2022-08-24 RX ADMIN — TRAMADOL HYDROCHLORIDE 100 MG: 50 TABLET, COATED ORAL at 20:38

## 2022-08-24 ASSESSMENT — PAIN DESCRIPTION - ONSET
ONSET: ON-GOING
ONSET: ON-GOING

## 2022-08-24 ASSESSMENT — PAIN - FUNCTIONAL ASSESSMENT
PAIN_FUNCTIONAL_ASSESSMENT: ACTIVITIES ARE NOT PREVENTED

## 2022-08-24 ASSESSMENT — PAIN DESCRIPTION - LOCATION
LOCATION: ABDOMEN
LOCATION: HEAD

## 2022-08-24 ASSESSMENT — PAIN DESCRIPTION - DESCRIPTORS
DESCRIPTORS: ACHING;DISCOMFORT;HEAVINESS
DESCRIPTORS: DISCOMFORT;SHARP
DESCRIPTORS: ACHING
DESCRIPTORS: DISCOMFORT;SHARP
DESCRIPTORS: ACHING;DISCOMFORT

## 2022-08-24 ASSESSMENT — PAIN DESCRIPTION - PAIN TYPE
TYPE: ACUTE PAIN
TYPE: ACUTE PAIN

## 2022-08-24 ASSESSMENT — PAIN DESCRIPTION - ORIENTATION
ORIENTATION: MID

## 2022-08-24 ASSESSMENT — PAIN SCALES - GENERAL
PAINLEVEL_OUTOF10: 10
PAINLEVEL_OUTOF10: 6
PAINLEVEL_OUTOF10: 10
PAINLEVEL_OUTOF10: 5
PAINLEVEL_OUTOF10: 9
PAINLEVEL_OUTOF10: 7
PAINLEVEL_OUTOF10: 9
PAINLEVEL_OUTOF10: 10
PAINLEVEL_OUTOF10: 7

## 2022-08-24 ASSESSMENT — PAIN DESCRIPTION - FREQUENCY
FREQUENCY: CONTINUOUS
FREQUENCY: CONTINUOUS

## 2022-08-24 NOTE — ED NOTES
Janki Lares would like to be notified prior to surgery, [phone], would like to be here as family/moral support.       Elizabeth Lewis [facility]  74/[birth year]/53 [phone]

## 2022-08-24 NOTE — PLAN OF CARE
Patient's chart updated to reflect:      . - HF care plan, HF education points and HF discharge instructions.  -Orders: 2 gram sodium diet, daily weights, I/O.  -PCP and/or Cardiologist appointment to be scheduled within 7 days of hospital discharge.  -History of HF, not primary admission Dx. Patient admitted for treatment of postoperative abscess.      Tequila Smalls RN BSN  Heart Failure Navigator

## 2022-08-24 NOTE — CONSULTS
Medications  Current Facility-Administered Medications   Medication Dose Route Frequency Provider Last Rate Last Admin    methocarbamol (ROBAXIN) tablet 500 mg  500 mg Oral TID PRN Gilford Keys, MD        vancomycin 2000 mg in dextrose 5% 500 ml IVPB  20 mg/kg IntraVENous Once Lawson Cunningham MD        [facility]] ketorolac (TORADOL) injection 15 mg  15 mg IntraVENous Q6H Angelika Doll MD   15 mg at 08/24/22 0642    ampicillin-sulbactam (UNASYN) 3000 mg in 100 mL NS IVPB minibag  3,000 mg IntraVENous Q6H Angelika Doll  mL/hr at 08/24/22 0757 3,000 mg at 08/24/22 0757    bumetanide (BUMEX) tablet 1 mg  1 mg Oral Daily Roselie Mar, APRN - CNP   1 mg at 08/24/22 0747    carvedilol (COREG) tablet 25 mg  25 mg Oral BID WC Roselie Mar, APRN - CNP   25 mg at 08/24/22 0747    cetirizine (ZYRTEC) tablet 10 mg  10 mg Oral Daily Roselie Mar, APRN - CNP   10 mg at 08/24/22 0747    docusate sodium (COLACE) capsule 200 mg  200 mg Oral Nightly Roselie Mar, APRN - CNP   200 mg at 08/24/22 0004    DULoxetine (CYMBALTA) extended release capsule 60 mg  60 mg Oral BID Roselie Mar, APRN - CNP   60 mg at 08/24/22 0747    famotidine (PEPCID) tablet 40 mg  40 mg Oral Dinner Roselie Mar, APRN - CNP   40 mg at 08/24/22 0004    fluticasone (FLONASE) 50 MCG/ACT nasal spray 2 spray  2 spray Each Nostril Daily Roselie Mar, APRN - CNP   2 spray at 08/24/22 0746    gabapentin (NEURONTIN) capsule 100 mg  100 mg Oral BID Roselie Mar, APRN - CNP   100 mg at 08/24/22 0747    hydrALAZINE (APRESOLINE) tablet 50 mg  50 mg Oral TID Roselie Mar, APRN - CNP   50 mg at 08/24/22 0747    insulin glargine (LANTUS) injection vial 20 Units  20 Units SubCUTAneous BID Roselie Mar, APRN - CNP   20 Units at 08/24/22 0747    isosorbide mononitrate (IMDUR) extended release tablet 60 mg  60 mg Oral Daily PATRIZIA Delgado CNP   60 mg at 08/24/22 0747    mirabegron (MYRBETRIQ) extended release tablet 50 mg (Patient Supplied)  50 mg Oral Nightly Leim Bidding, APRN - CNP        montelukast (SINGULAIR) tablet 10 mg  10 mg Oral Nightly Leim Bidding, APRN - CNP   10 mg at 08/24/22 0004    nitroGLYCERIN (NITROSTAT) SL tablet 0.4 mg  0.4 mg SubLINGual Q5 Min PRN Leim Bidding, APRN - CNP        pantoprazole (PROTONIX) tablet 40 mg  40 mg Oral Daily Leim Bidding, APRN - CNP   40 mg at 08/24/22 0747    sacubitril-valsartan (ENTRESTO) 24-26 MG per tablet 1 tablet  1 tablet Oral BID Leim Bidding, APRN - CNP   1 tablet at 08/24/22 0746    simvastatin (ZOCOR) tablet 20 mg (Patient Supplied)  20 mg Oral Nightly Leim Bidding, APRN - CNP        traZODone (DESYREL) tablet 100 mg  100 mg Oral Nightly Leim Bidding, APRN - CNP   100 mg at 08/24/22 0004    vitamin D (CHOLECALCIFEROL) tablet 1,000 Units  1,000 Units Oral Daily Leim Bidding, APRN - CNP   1,000 Units at 08/24/22 0747    Revefenacin SOLN 3 mL (Patient Supplied)  3 mL Inhalation Daily Leim Bidding, APRN - CNP        glucose chewable tablet 16 g  4 tablet Oral PRN Leim Bidding, APRN - CNP        dextrose bolus 10% 125 mL  125 mL IntraVENous PRN Leim Bidding, APRN - CNP        Or    dextrose bolus 10% 250 mL  250 mL IntraVENous PRN Leim Bidding, APRN - CNP        glucagon (rDNA) injection 1 mg  1 mg SubCUTAneous PRN Leim Bidding, APRN - CNP        dextrose 10 % infusion   IntraVENous Continuous PRN Leim Bidding, APRN - CNP        insulin lispro (HUMALOG) injection vial 0-8 Units  0-8 Units SubCUTAneous Q4H Leim Bidding, APRN - CNP   2 Units at 08/24/22 7619    sodium chloride flush 0.9 % injection 10 mL  10 mL IntraVENous 2 times per day Leim Bidding, APRN - CNP        sodium chloride flush 0.9 % injection 10 mL  10 mL IntraVENous PRN Leim Bidding, APRN - CNP        0.9 % sodium chloride infusion   IntraVENous PRN Leim Bidding, APRN - CNP        potassium chloride (KLOR-CON M) extended release tablet 40 mEq  40 mEq Oral PRN PATRIZIA Rueda CNP        Or    potassium bicarb-citric acid (EFFER-K) effervescent tablet 40 mEq  40 mEq Oral PRN PATRIZIA Rueda CNP        Or    potassium chloride 10 mEq/100 mL IVPB (Peripheral Line)  10 mEq IntraVENous PRN PATRIZIA Rueda CNP        budesonide (PULMICORT) nebulizer suspension 500 mcg  0.5 mg Nebulization BID PATRIZIA Rueda CNP        Arformoterol Tartrate (BROVANA) nebulizer solution 15 mcg  15 mcg Nebulization BID PATRIZIA Rueda CNP           PRN Medications  methocarbamol, nitroGLYCERIN, glucose, dextrose bolus **OR** dextrose bolus, glucagon (rDNA), dextrose, sodium chloride flush, sodium chloride, potassium chloride **OR** potassium alternative oral replacement **OR** potassium chloride    Objective  Most Recent Recorded Vitals  BP (!) 160/90   Pulse 66   Temp 98.2 °F (36.8 °C) (Oral)   Resp 18   Ht 5' 1\" (1.549 m)   Wt 214 lb (97.1 kg)   LMP 01/01/1990   SpO2 100%   BMI 40.43 kg/m²   No intake/output data recorded. No intake/output data recorded.     Physical Exam:  General: AAO to person/place/time/purpose, NAD, no labored breathing, 3L O2 via NC, obese  Eyes: conjunctivae/corneas clear, sclera non icteric  Skin: color/texture/turgor normal, no rashes or lesions  Lungs: CTAB with diminished breath sounds, no retractions/use of accessory muscles, no vocal fremitus, no rhonchi, no crackle, no rales  Heart: regular rate, regular rhythm, no murmur  Abdomen: soft, right sided ttp and distension, bowel sounds normal, incisions in LLQ are clean and intact   Extremities: atraumatic, no edema  Neurologic: cranial nerves 2-12 grossly intact, no slurred speech    Most Recent Labs  Lab Results   Component Value Date    WBC 9.6 08/24/2022    HGB 12.0 08/24/2022    HCT 38.3 08/24/2022     08/24/2022     08/24/2022    K 5.2 (H) 08/24/2022    K 5.2 (H) 08/24/2022     08/24/2022    CREATININE 1.3 (H) 08/24/2022    BUN 27 (H) 08/24/2022 CO2 25 08/24/2022    GLUCOSE 272 (H) 08/24/2022    ALT 15 08/24/2022    AST 9 08/24/2022    INR 1.0 08/23/2022    TSH 0.646 02/05/2021    LABA1C 6.7 (H) 08/24/2022    LABMICR 116.9 (H) 02/05/2021       CT ABDOMEN PELVIS WO CONTRAST Additional Contrast? None   Final Result   6.5 cm x 6.5 cm x 3.5 cm gas and fluid containing thick-walled collection in   the ventral abdominal wall subcutaneous fat to the right of midline   consistent with abscess and adjacent fat containing hernia. .         XR CHEST PORTABLE   Final Result   Early CHF. Echocardiogram 4/13/22    Summary   Technically difficult examination. Severe concentric left ventricular hypertrophy. Left ventricular diastolic filling is elevated . Ejection fraction is visually estimated at 50 to 55%. Normal right ventricular size and function. Mild mitral regurgitation is present. Mild tricuspid regurgitation.       Assessment   Active Hospital Problems    Diagnosis     Coronary artery disease involving native coronary artery of native heart without angina pectoris [I25.10]      Priority: High    Postoperative abscess [T81.49XA]      Priority: Medium    DM2 (diabetes mellitus, type 2) ([facility]) [E11.9]      Priority: Medium    DAYAN and COPD overlap syndrome ([facility] [address] [G47.33, J44.9]      Priority: Medium    Cigarette smoker [F17.210]      Priority: Low    Marijuana use, smoked [F12.90]      Priority: Low    CKD (chronic kidney disease) stage 3, GFR 30-59 ml/min ([facility]) [N18.30]     Hypertensive emergency [I16.1]     Acute on chronic diastolic (congestive) heart failure ([facility]) [I50.33]     History of stroke [Z86.73]     Hiatal hernia [K44.9]     Glaucoma, open angle [H40.10X0]     Major depressive disorder, recurrent episode, mild ([facility]) [F33.0]     GERD (gastroesophageal reflux disease) [K21.9]     Hyperlipidemia [E78.5]          Plan  Post operative seroma vs abscess   General surgery admitted the patient   Abx per surgery at this time   Check

## 2022-08-24 NOTE — PROGRESS NOTES
Messaged Dr. Liang Reading for something for a headache/abd pain. Awaiting return call.  Marty Borja RN

## 2022-08-24 NOTE — H&P
GENERAL SURGERY  H&P NOTE  8/24/2022      HPI  Antony Childress is a 78 y.o. female who presents for evaluation of abdominal pain which as been present since her incisional hernia repair 2 weeks ago. She has been having mild abdominal pain without fevers, chills, nausea or vomiting. She has been tolerating a diet well at home. Patient presented to the ED due to persistent pain. Ct shows likely seroma.        Past Medical History:   Diagnosis Date    Asthma     Atherosclerosis of native artery of left leg with rest pain ([address] 11/09/2018    CAD (coronary artery disease) 2011    Cellulitis and abscess of trunk 04/14/2015    Cerebellar infarct ([address] 04/03/2019    Remote, rt. cerebellum, head CT scan, 1/9/19    Chronic back pain     Chronic kidney disease     Chronic systolic congestive heart failure ([address] 10/04/2013    Chronic venous insufficiency 10/11/2017    COPD (chronic obstructive pulmonary disease) ([address]     COVID-19     Depression     Diabetes mellitus ([address]     Diabetic neuropathy ([address]     Diverticulosis     Fatty liver 01/08/2016    per US    Glaucoma, open angle 02/01/2016    Mild-OU    Hepatic encephalopathy ([address] 02/07/2016    resolved    Hiatal hernia     History of blood transfusion     Hyperlipidemia     Hyperplastic colon polyp     Hypertension     Incontinence     Liver mass     Morbid obesity ([address]     Movement disorder     Myocardial infarction ([address] 2011    O2 dependent 09/16/2021    with bipap 3 liters    Osteoarthritis, generalized     Pain in both lower legs 10/11/2017    Peripheral vascular disease ([address]     Pneumonia 01/26/2014    Pulmonary edema     resolved    PVD (peripheral vascular disease) with claudication ([address] 08/30/2017    Sleep apnea     uses BI PAP    Status post peripheral artery angioplasty 10/31/2019    Tobacco abuse     Tobacco abuse 10/11/2017    Tobacco dependence 6/30/2022    Tubular adenoma polyp of rectum     Urinary tract infection due to ESBL Klebsiella 03/08/2017 Ventral hernia        Past Surgical History:   Procedure Laterality Date    ANGIOPLASTY  11/13/2018    Dr. Shagufta Germain & athrectomy L SFA & Popliteal    BREAST SURGERY  2000    bilateral reduction    BRONCHIAL BRUSH BIOPSY  1/25/2013    Dr Mai Lee  04/20/2015    Dr. Jad Cronin  12/2011     DR. Rosangela Cee,  follows Dr Ignacio Kruse  11/15/2013    Dr Corinne Bees    COLONOSCOPY  12/23/2016    Dr Gary Rendon adenoma & hyperplastic polyps, melanosis coli (repeat one year 12/2017)    CORONARY ARTERY BYPASS GRAFT      ECHO COMPLETE  10/9/2013         ENDOSCOPY, COLON, DIAGNOSTIC  04/18/2017    GALLBLADDER SURGERY      gallstones removed, [facility] 8/2017    HYSTERECTOMY (CERVIX STATUS UNKNOWN)      JOINT REPLACEMENT  2011    LEFT KNEE    KNEE SURGERY      left knee replacement    LARYNGOSCOPY N/A 4/4/2022    DIRECT LARYNGOSCOPY WITH BIOPSY performed by Deisy Diaz DO at . [address] Left [id number]    LIVER BIOPSY  1/8/2016    [facility]    POLYSOMNOGRAPHY  1/2016    [facility]    UPPER GASTROINTESTINAL ENDOSCOPY  12/20/12    UPPER GASTROINTESTINAL ENDOSCOPY  12/23/2016    Dr Luis Chahal GASTROINTESTINAL ENDOSCOPY  02/08/2017       Medications Prior to Admission    Prior to Admission medications    Medication Sig Start Date End Date Taking? Authorizing Provider   RACHEL ELLIPTA 100-25 MCG/INH AEPB inhaler Inhale 1 puff into the lungs daily 8/11/22   Historical Provider, MD   YUPELRI 175 MCG/3ML SOLN Inhale 3 mLs into the lungs daily 6/8/22   Historical Provider, MD   azelastine (ASTELIN) 0.1 % nasal spray 2 sprays by Nasal route in the morning and 2 sprays before bedtime. Use in each nostril as directed. 8/16/22   Deisy Diaz DO   isosorbide mononitrate (IMDUR) 60 MG extended release tablet Take 60 mg by mouth in the morning.     Historical Provider, MD   fluticasone (FLONASE) 50 MCG/ACT nasal spray 2 sprays by Each Nostril route in the morning. 7/27/22   Gideon Smallwood,    bumetanide (BUMEX) 1 MG tablet Take 1 tablet by mouth daily 7/6/22   Henrry Buckner MD   carvedilol (COREG) 25 MG tablet Take 1 tablet by mouth 2 times daily (with meals) 7/6/22   Henrry Buckner MD   sacubitril-valsartan (ENTRESTO) 24-26 MG per tablet Take 1 tablet by mouth 2 times daily 7/6/22   Henrry Buckner MD   aspirin 81 MG chewable tablet Take 1 tablet by mouth daily 7/6/22   Henrry Buckner MD   hydrALAZINE (APRESOLINE) 50 MG tablet Take 1 tablet by mouth 3 times daily 7/6/22   Henrry Buckner MD   simvastatin (ZOCOR) 20 MG tablet Take 1 tablet by mouth nightly 7/6/22   Henrry Buckner MD   cetirizine (ZYRTEC) 10 MG tablet Take 1 tablet by mouth daily 5/20/22   Kait Diaz,    nitroGLYCERIN (NITROSTAT) 0.3 MG SL tablet Place 0.3 mg under the tongue every 5 minutes as needed for Chest pain up to max of 3 total doses. If no relief after 1 dose, call 911. Historical Provider, MD   acetaminophen (TYLENOL) 325 MG tablet Take 1,000 mg by mouth every 6 hours as needed for Pain     Historical Provider, MD   DULoxetine (CYMBALTA) 60 MG extended release capsule Take 60 mg by mouth 2 times daily Patient only has supply to take once a day    Historical Provider, MD   famotidine (PEPCID) 40 MG tablet Take 40 mg by mouth Daily with supper    Historical Provider, MD   gabapentin (NEURONTIN) 100 MG capsule Take 100 mg by mouth 2 times daily.     Historical Provider, MD   Insulin Degludec (TRESIBA FLEXTOUCH) 200 UNIT/ML SOPN Inject 50 Units into the skin at bedtime    Historical Provider, MD   insulin lispro, 1 Unit Dial, 100 UNIT/ML SOPN Inject 0-6 Units into the skin 3 times daily (before meals) *Per Sliding Scale*    Historical Provider, MD   montelukast (SINGULAIR) 10 MG tablet Take 10 mg by mouth nightly    Historical Provider, MD   mirabegron (MYRBETRIQ) 50 MG TB24 Take 50 mg by mouth at bedtime    Historical Provider, MD   traZODone (DESYREL) 100 MG tablet Take 100 mg by mouth nightly    Historical Provider, MD   vitamin D (CHOLECALCIFEROL) 25 MCG (1000 UT) TABS tablet Take 1,000 Units by mouth daily    Historical Provider, MD   pantoprazole (PROTONIX) 40 MG tablet Take 40 mg by mouth daily  6/21/21   Historical Provider, MD   docusate sodium (COLACE) 100 MG capsule Take 200 mg by mouth at bedtime  3/11/21   Historical Provider, MD       Allergies   Allergen Reactions    Latex Hives    Bee Venom Anaphylaxis    Dilaudid [Hydromorphone Hcl] Itching    Dye [Iodides] Hives and Shortness Of Breath    Percocet [Oxycodone-Acetaminophen] Shortness Of Breath and Itching    Keflex [Cephalexin] Itching and Rash    Lasix [Furosemide] Other (See Comments)     Pt states she cramps up and gets headaches    Levaquin [Levofloxacin In D5w] Hives    Lipitor      MUSCLE SPASMS    Lyrica [Pregabalin]      Dream disturbances    Morphine Hives    Naproxen      Unsure of reaction;pt states able to take Aleve without difficulties    Nefazodone Other (See Comments)    Norvasc [Amlodipine] Swelling    Oxycodone-Acetaminophen Swelling    Shellfish-Derived Products     Trazodone And Nefazodone        Family History   Problem Relation Age of Onset    Cancer Mother     Asthma Father        Social History     Tobacco Use    Smoking status: Every Day     Packs/day: 0.25     Years: 40.00     Pack years: 10.00     Types: Cigarettes     Start date: 8/10/1974    Smokeless tobacco: Never    Tobacco comments:     2 cigs daily   Vaping Use    Vaping Use: Never used   Substance Use Topics    Alcohol use: Not Currently     Comment: social    Drug use: Yes     Types: Marijuana (Weed)     Comment: \"every 4th or 5th day out of the week\"         Review of Systems: pertinent ROS listed in HPI, all others negative       PHYSICAL EXAM:    Vitals:    08/24/22 0730   BP: (!) 160/90   Pulse: 66   Resp: 18   Temp: 98.2 °F (36.8 °C)   SpO2: 100%       GENERAL:  NAD. A&Ox3.  HEAD:  Normocephalic. Atraumatic. EYES:   No scleral icterus. PERRL. LUNGS:  No acute respiratory distress  CARDIOVASCULAR: Hemodynamically stable  ABDOMEN:  Soft, non-distended, mildly tender over incision sites. No guarding, rigidity, rebound. EXTREMITIES:   MAEx4. Atraumatic. No LE edema. SKIN:  Warm and dry  NEUROLOGIC:  no focal neurologic deficits      ASSESSMENT/PLAN:  78 y.o. female with post-op incisional pain and seroma. Plan  -work on adequate pain control  -prn nausea control  -suspect post op seroma rather than abscess, however we will continue abx  -no acute surgical intervention at this time   -advance diet     Plan will be discussed with Dr. Rosy Chaidez.     Amina Mims MD  Surgery Resident PGY-2  8/24/2022  7:46 AM

## 2022-08-24 NOTE — PROGRESS NOTES
I was called by nursing because a consult was placed for medical management. General surgery admitted the patient for a postoperative abscess. I have placed admission orders. Dr. Bertrand Beasley will see the patient in the morning. Please call me with any urgent matters.

## 2022-08-24 NOTE — PROGRESS NOTES
Spoke with Dr. Gwen Alejandre regarding patient using triology at home. New orders given.   Percy Desai RN

## 2022-08-24 NOTE — PROGRESS NOTES
I was messaged by nursing because patient was previously ordered LR and I placed orders for D5NS. Because the patient is diabetic and NPO for a procedure LR was discontinued and D5NS ordered. Dr. Solomon Chambers will see the patient this morning. Please call for any urgent matters.

## 2022-08-24 NOTE — PROGRESS NOTES
Occupational Therapy  OCCUPATIONAL THERAPY INITIAL EVALUATION     Lindsay Hernandez [address]        Date:2022                                                  Patient Name: Trav Orosco    MRN: [id number]    : 1955    Room: [address] [place] [address]      Evaluating OT: Izzy Price, OTR/L [id number]      Referring Provider: PATRIZIA Willis CNP    Specific Provider Orders/Date:OT eval and treat 22      Diagnosis:  COPD exacerbation ([facility] [J44.1]  Postoperative abscess [T81.49XA]      Pertinent Medical History: asthma, CAD, chronic back pain, COPD, COVID-19, DM, diabetic neuropathy, HTN, MI, OA, hernia repair 22      Precautions:  Fall Risk, , O2, Kiana     Assessment of current deficits    [x] Functional mobility  [x]ADLs  [x] Strength               []Cognition    [x] Functional transfers   [x] IADLs         [x] Safety Awareness   [x]Endurance    [] Fine Coordination              [x] Balance      [] Vision/perception   []Sensation     []Gross Motor Coordination  [] ROM  [] Delirium                   [] Motor Control     OT PLAN OF CARE   OT POC based on physician orders, patient diagnosis and results of clinical assessment    Frequency/Duration 1-3 days/wk for 2 weeks PRN   Specific OT Treatment Interventions to include:   * Instruction/training on adapted ADL techniques and AE recommendations to increase functional independence within precautions       * Training on energy conservation strategies, correct breathing pattern and techniques to improve independence/tolerance for self-care routine  * Functional transfer/mobility training/DME recommendations for increased independence, safety, and fall prevention  * Patient/Family education to increase follow through with safety techniques and functional independence  * Recommendation of environmental modifications for increased safety with functional transfers/mobility and ADLs  * independence with ADLs and functional task completion   Balance Sitting:     Static:  independent     Dynamic:Sup  Standing: Sup with wheeled walker  I for dynamic sitting balance to maximize independence with ADLs and functional task completion    I for standing balance to maximize independence with ADLs and functional task completion   Activity Tolerance Good with light activity  Good with ADL activity. Pt will demonstrate good understanding of education provided on EC/WS techniques    Visual/  Perceptual Glasses: yes                Additional long-term goal: Pt will increase functional independence to PLOF to allow pt to live in least restrictive environment. Hand Dominance R   AROM (PROM) Strength Additional Info:    RUE  WFL WFL good  and wfl FMC/dexterity noted during ADL tasks       LUE WFL WFL good  and wfl FMC/dexterity noted during ADL tasks     Hearing: Kashia   Sensation:  No c/o numbness or tingling   Tone: WFL   Edema: none noted    Comments: Upon arrival patient sitting on BSC. At end of session, patient sitting in chair with call light and phone within reach, all lines and tubes intact. Overall patient demonstrated decreased independence and safety during completion of ADL/functional transfer/mobility tasks. Pt would benefit from continued skilled OT to increase safety and independence with completion of ADL/IADL tasks for functional independence and quality of life. Rehab Potential: Good for established goals     Patient / Family Goal: to return home    Patient and/or family were instructed on functional diagnosis, prognosis/goals and OT plan of care. Demonstrated good understanding.      Eval Complexity: Low    Time In: 7621  Time Out: 0900    Min Units   OT Eval Low 97165  x  1   OT Eval Medium 89141      OT Eval High 23683      OT Re-Eval [id number]       Therapeutic Ex 65440       Therapeutic Activities 57159       ADL/Self Care 1 [facility]       Orthotic Management [id number]       Manual [id number] Neuro Re-Ed [id number]       Non-Billable Time          Evaluation Time additionally includes thorough review of current medical information, gathering information on past medical history/social history and prior level of function, interpretation of standardized testing/informal observation of tasks, assessment of data and development of plan of care and goals.             Gladys Hudson, OTR/L, [id number]

## 2022-08-24 NOTE — PROGRESS NOTES
Pt made aware she needs to bring her Zocor from home. Stated she no longer takes Revefenacin inhaler or Mirabegron.  Travis Perkins RN

## 2022-08-24 NOTE — CARE COORDINATION
Met w/ patient. Explained role of  and plan of care. Lives alone in a 1 story house- total 4 steps to entrance. Has rollator, [employer], nebulizer, glucometer and all necessary testing supplies. Has home PATRICK non invasive ventilator w/ O2 4l bleed provided by [facility]. Hx [facility] . Hx [facility]. Pt's  through [employer] waiver program is [facility] [phone]- notified of admission. Has home aide 7 days a week/ 5 1/2-7 hours /day. PCP is Dr. Francisca Jarrell and pharmacy is [facility] [place] [facility] [place]. Continues on iv abx. On Entresto PTA. PT/OT evals pending. Per pt, plan is to return home on discharge- agreeable to [facility] - no [facility] preference- referral made to [facility]- will need [facility] order on discharge. Pt states her aide or her grandson will provide transportation home. SW updated. Will follow Mauricio Yip RNcase manager    The Plan for Transition of Care is related to the following treatment goals: physical conditioning, possible home iv abx    The Patient and/or patient representative Nae Hinton was provided with a choice of provider and agrees   with the discharge plan. [x] Yes [] No    Freedom of choice list was provided with basic dialogue that supports the patient's individualized plan of care/goals, treatment preferences and shares the quality data associated with the providers.  [x] Yes [] No

## 2022-08-24 NOTE — PROGRESS NOTES
Pt unable to bring home BiPAP unit in and is utilizing hospital BiPAP. May we please switch from home unit order to hospital NIV. Thank you.

## 2022-08-24 NOTE — DISCHARGE INSTRUCTIONS
HEART FAILURE  / CONGESTIVE HEART FAILURE  DISCHARGE INSTRUCTIONS:  GUIDELINES TO FOLLOW AT HOME    Self- Managed Care:     MEDICATIONS:  Take your medication as directed. If you are experiencing any side effects, inform your doctor, Do not stop taking any of your medications without letting your doctor know. Check with your doctor before taking any over-the-counter medications / herbal / or dietary supplements. They may interfere with your other medications. Do not take ibuprofen (Advil or Motrin) and naproxen (Aleve) without talking to your doctor first. They could make your heart failure worse. WEIGHT MONITORING:   Weigh yourself everyday (with the same scale) around the same time of the day and write it down. (you can chart them on a calendar or keep track of them on paper. Notify your doctor of a weight gain of 3 pounds or more in 1 day   OR a total of 5 pounds or more in 1 week    Take your weight record to your doctor visits  Also, the same goes if you loose more than 3# in one day, let your heart doctor know. DIET:   Cardiac heart healthy diet- Low saturated / low trans fat, no added salt, caffeine restricted, Low sodium diet-   No more than 2,000mg (2 grams) of salt / sodium per day (which equals to a little less than  a teaspoon of salt)  If your doctor wants you on a fluid restriction. ..it is usually recommended a fluid limit of 2,000cc -  Fluid restriction- 2,000 ml (milliliters) = 64 ounces = you can have 8 glasses of fluid per day (each glass 8 ounces)    Follow a low salt diet - avoid using salt at the table, avoid / limit use of canned soups, processed / packaged foods, salted snacks, olives and pickles. Do not use a salt substitute without checking with your doctor, they may contain a high amount of potassioum. (Mrs. Sylvester Sober is safe to use).     Limit the use of alcohol       CALL YOUR DOCTOR THE FIRST DAY YOU NOTICE ANY OF THESE   SYMPTOMS:  You have a weight gain of 3 pounds or more in 1 day         OR 5 pounds or more in one week  More shortness of breath  More swelling of your stomach, legs, ankles or feet  Feeling more tired, No energy  Dry hacky cough  Dizziness  More chest pain / discomfort       (CALL 911 IF ANY OF THE FOLLOWING OCCURS  Chest pain (not relieved with nitroglycerine, if you have been prescribed this medication)  Severe shortness of breath  Faint / Pass out  Confusion / cannot think clearly  If symptoms get worse           SMOKING - TOBACCO USE:  * IF YOU SMOKE - STOP! Kick the habit. [address] Program is offered at [facility] and [address] [facility]. Call [phone] extension [phone] for more information. ACTIVITY:   (Ask your doctor when you will be able to return to work and before starting any exercise program.  Do not drive unless unless your doctor has given you permission to do so). Start light exercise. Even if you can only do a small amount, exercise will help you get stronger, have more energy, help manage your weight and decrease  stress. Walking is an easy way to get exercise. Start out slowly and  increase the amount you walk as tolerated  If you become short of breath, dizzy or have chest pain; stop, sit down, and rest.  If you feel \"wiped out\" the day after you exercise, walk at a slower pace or for a shorter distance. You can gradually increase the pace or amount of time. (Do not exercise right after a meal or in extreme temperatures, such as above 85 degrees, if the air is really humid, or wind chill is less than 20 degrees)                                             ADDITIONAL INFORMATION:  Avoid getting sick from colds and the flu. Stay away from friends or family that you know may have a contagious illness  Get plenty of rest   Get a flu shot each year. Get a pneumococcal vaccine shot.  If you have had one before, ask your doctor whether you need another dose. My Goal for Self-management of Heart Failure Includes 5 steps :    1. Notice a change in symptoms ( weight gain, short of breath, leg swelling, decreased activity level, bloating. ...)    2. Evaluate the change: (use the Heart Failure Zones )     3. Decide to take action: decide what your options are, such as: (call your doctor for an extra visit, take a prescribed medication, such as your water pill if your doctor has given you directions to do so, [address])    4. Come up with a strategy:  (now you call the doctor for advice / appointment. This is where you take action!!! Do not wait, catch the symptom early and treat it before it worsens. 5. Evaluate the response: The next day, check your Heart Failure Zones: are you in the GREEN ZONE (safe zone)? Worsening symptoms of YELLOW ZONE? Or have you moved to the RED ZONE and need to call 911 or go to the Emergency Room for evaluation? Call your doctor's office to update them on your symptoms of heart failure. Post op Abdominal Surgery Instructions    1. May shower daily with Dial soap and water. Pat incisions dry. No lotions, creams or powders. No tub baths/hot tubs or pools. 2. No lifting [id number]; no pushing/pulling. No driving until after seen by surgeon in the office. May go up and down a flight of stairs 1-2X daily. Walk at least 3x daily. No sexual activity until after seen in the office. 3.  Okay for Diet    4. Take all medications as listed on your Discharge Instruction sheet    5. Call Your Doctor If Any of the Following Occurs     Signs of infection, including fever and chills   Redness, swelling, increasing pain, excessive bleeding, or discharge at the incision site   Cough, shortness of breath, chest pain   Increased abdominal pain   Nausea and/or vomiting that does not resolve off narcotics.   Pain, burning, urgency or frequency of urination, or blood in the urine   Pain and/or swelling in your feet, calves, or legs   Dark urine, light stools, or evidence of jaundice (yellowing of the skin or eyes). In case of an emergency,    CALL 911  immediately. 6. Follow up with Dr. Vania Andujar in 2 weeks, please call his office upon discharge to schedule an appointment.

## 2022-08-24 NOTE — PROGRESS NOTES
Physical Therapy  Facility/Department: [address] INTERNAL MEDICINE 2  Physical Therapy Initial Assessment    Name: Yina Ruiz  : 1955  MRN: [id number]  Date of Service: 2022      Patient Diagnosis(es): The primary encounter diagnosis was Postoperative abscess. A diagnosis of COPD exacerbation ([address] was also pertinent to this visit. Past Medical History:  has a past medical history of Asthma, Atherosclerosis of native artery of left leg with rest pain ([address] CAD (coronary artery disease), Cellulitis and abscess of trunk, Cerebellar infarct (HCC), Chronic back pain, Chronic kidney disease, Chronic systolic congestive heart failure ([address] Chronic venous insufficiency, COPD (chronic obstructive pulmonary disease) ([address] COVID-19, Depression, Diabetes mellitus ([address] Diabetic neuropathy ([address] Diverticulosis, Fatty liver, Glaucoma, open angle, Hepatic encephalopathy ([address] Hiatal hernia, History of blood transfusion, Hyperlipidemia, Hyperplastic colon polyp, Hypertension, Incontinence, Liver mass, Morbid obesity ([address] Movement disorder, Myocardial infarction ([address] O2 dependent, Osteoarthritis, generalized, Pain in both lower legs, Peripheral vascular disease ([address] Pneumonia, Pulmonary edema, PVD (peripheral vascular disease) with claudication ([address] Sleep apnea, Status post peripheral artery angioplasty, Tobacco abuse, Tobacco abuse, Tobacco dependence, Tubular adenoma polyp of rectum, Urinary tract infection due to ESBL Klebsiella, and Ventral hernia. Past Surgical History:  has a past surgical history that includes knee surgery; Upper gastrointestinal endoscopy (12); Coronary artery bypass graft; Layered wound closure (Left, [id number]); Echo Complete (10/9/2013); polysomnography (2016); liver biopsy (2016); bronchial brush biopsy (2013); Breast surgery (); Cholecystectomy (YRS AGO); Cardiac surgery (2011); Cardiac catheterization (2015);  Hysterectomy; joint replacement (2011); Upper gastrointestinal endoscopy (12/23/2016); Colonoscopy (11/15/2013); Colonoscopy (12/23/2016); Upper gastrointestinal endoscopy (02/08/2017); Endoscopy, colon, diagnostic (04/18/2017); Gallbladder surgery; angioplasty (11/13/2018); and laryngoscopy (N/A, 4/4/2022). Evaluating Therapist: Gavino Clarke PT    Room #:  3998/3526-L  Diagnosis:  COPD exacerbation ([facility] [J44.1]  Postoperative abscess [T81.49XA]  PMHx/PSHx:  COPD, hernia repair 8/11  Precautions:  falls, O2      Social:  Pt lives alone in a 1 floor plan 2 steps  to enter. Prior to admission independent with ww     Initial Evaluation  Date: 8/24/22 Treatment      Short Term/ Long Term   Goals   Was pt agreeable to Eval/treatment? yes     Does pt have pain? Bed Mobility  Rolling: NT  Supine to sit: NT  Sit to supine: NT  Scooting: NT  independent   Transfers Sit to stand: independent  Stand to sit: independent  Stand pivot: independent  independent   Ambulation    25 feet with no device with CGa  50 feet with ww supervision  150 feet with ww independent   Stair Negotiation  Ascended and descended  NT   2 steps with 1 rail with supervision   LE strength     4-/5    4/5   balance      Fair+     AM-PAC Raw score               20/24         Pt is alert and Oriented   LE ROM: WL  Sensation: intact  Edema: none  Endurance: fair       ASSESSMENT:    Pt displays functional ability as noted in the objective portion of this evaluation. Patient education  Pt educated on PT objectives    Patient response to education:   Pt verbalized understanding Pt demonstrated skill Pt requires further education in this area   yes           Comments:  Pt unsteady with ambulation without device. Loss of balance with CGA to correct. Recommend continued use of ww      Pt's/ family goals   1.  To return home    Conditions Requiring Skilled Therapeutic Intervention:    [x]Decreased strength     []Decreased ROM  [x]Decreased functional mobility  [x]Decreased balance   [x]Decreased endurance   []Decreased posture  []Decreased sensation  []Decreased coordination   []Decreased vision  []Decreased safety awareness   []Increased pain       Patient and or family understand(s) diagnosis, prognosis, and plan of care. Prognosis is good for reaching above PT goals    PHYSICAL THERAPY PLAN OF CARE:    PT POC is established based on physician order and patient diagnosis     Referring provider/PT Order: PATRIZIA Gonzalez - CNP/ PT eval and treat      Current Treatment Recommendations:     [x] Strengthening to improve independence with functional mobility   [] ROM to improve independence with functional mobility   [x] Balance Training to improve static/dynamic balance and to reduce fall risk  [x] Endurance Training to improve activity tolerance during functional mobility   [x] Transfer Training to improve safety and independence with all functional transfers   [x] Gait Training to improve gait mechanics, endurance and assess need for appropriate assistive device  [x] Stair Training in preparation for safe discharge home and/or into the community   [] Positioning to prevent skin breakdown and contractures  [x] Safety and Education Training   [x] Patient/Caregiver Education   [] HEP  [] Other     PT long term treatment goals are located in above grid    Frequency of treatments: 2-5x/week x 3-5 days. Time in  0845  Time out  0900      Evaluation Time includes thorough review of current medical information, gathering information on past medical history/social history and prior level of function, completion of standardized testing/informal observation of tasks, assessment of data and education on plan of care and goals.       CPT codes:  [x] Low Complexity PT evaluation 19795  [] Moderate Complexity PT evaluation 12467  [] High Complexity PT evaluation 04843  [] PT Re-evaluation 67830  [] Gait training 80872 minutes  [] Manual therapy 61974 minutes  [] Therapeutic activities 79478 minutes  [] Therapeutic exercises 70745 minutes  [] Neuromuscular reeducation 82554 minutes     [facility] PT [id number]

## 2022-08-25 LAB
ALBUMIN SERPL-MCNC: 3.3 G/DL (ref 3.5–5.2)
ALP BLD-CCNC: 59 U/L (ref 35–104)
ALT SERPL-CCNC: 13 U/L (ref 0–32)
ANION GAP SERPL CALCULATED.3IONS-SCNC: 10 MMOL/L (ref 7–16)
AST SERPL-CCNC: 14 U/L (ref 0–31)
BILIRUB SERPL-MCNC: 0.3 MG/DL (ref 0–1.2)
BUN BLDV-MCNC: 33 MG/DL (ref 6–23)
CALCIUM SERPL-MCNC: 8.7 MG/DL (ref 8.6–10.2)
CHLORIDE BLD-SCNC: 102 MMOL/L (ref 98–107)
CO2: 26 MMOL/L (ref 22–29)
CREAT SERPL-MCNC: 1.4 MG/DL (ref 0.5–1)
GFR AFRICAN AMERICAN: 45
GFR NON-AFRICAN AMERICAN: 45 ML/MIN/1.73
GLUCOSE BLD-MCNC: 114 MG/DL (ref 74–99)
METER GLUCOSE: 125 MG/DL (ref 74–99)
METER GLUCOSE: 130 MG/DL (ref 74–99)
METER GLUCOSE: 139 MG/DL (ref 74–99)
METER GLUCOSE: 182 MG/DL (ref 74–99)
METER GLUCOSE: 217 MG/DL (ref 74–99)
METER GLUCOSE: 98 MG/DL (ref 74–99)
POTASSIUM REFLEX MAGNESIUM: 4.8 MMOL/L (ref 3.5–5)
SODIUM BLD-SCNC: 138 MMOL/L (ref 132–146)
TOTAL PROTEIN: 6.3 G/DL (ref 6.4–8.3)

## 2022-08-25 PROCEDURE — 6370000000 HC RX 637 (ALT 250 FOR IP): Performed by: SURGERY

## 2022-08-25 PROCEDURE — 80053 COMPREHEN METABOLIC PANEL: CPT

## 2022-08-25 PROCEDURE — 94660 CPAP INITIATION&MGMT: CPT

## 2022-08-25 PROCEDURE — 2580000003 HC RX 258: Performed by: SURGERY

## 2022-08-25 PROCEDURE — 82962 GLUCOSE BLOOD TEST: CPT

## 2022-08-25 PROCEDURE — 2060000000 HC ICU INTERMEDIATE R&B

## 2022-08-25 PROCEDURE — 2700000000 HC OXYGEN THERAPY PER DAY

## 2022-08-25 PROCEDURE — 36415 COLL VENOUS BLD VENIPUNCTURE: CPT

## 2022-08-25 PROCEDURE — 6370000000 HC RX 637 (ALT 250 FOR IP): Performed by: STUDENT IN AN ORGANIZED HEALTH CARE EDUCATION/TRAINING PROGRAM

## 2022-08-25 PROCEDURE — 6360000002 HC RX W HCPCS: Performed by: SURGERY

## 2022-08-25 PROCEDURE — 2500000003 HC RX 250 WO HCPCS: Performed by: STUDENT IN AN ORGANIZED HEALTH CARE EDUCATION/TRAINING PROGRAM

## 2022-08-25 PROCEDURE — 6360000002 HC RX W HCPCS: Performed by: NURSE PRACTITIONER

## 2022-08-25 PROCEDURE — 94640 AIRWAY INHALATION TREATMENT: CPT

## 2022-08-25 PROCEDURE — 2580000003 HC RX 258: Performed by: NURSE PRACTITIONER

## 2022-08-25 PROCEDURE — 6370000000 HC RX 637 (ALT 250 FOR IP): Performed by: NURSE PRACTITIONER

## 2022-08-25 RX ORDER — CALCIUM CARBONATE 200(500)MG
500 TABLET,CHEWABLE ORAL 3 TIMES DAILY PRN
Status: DISCONTINUED | OUTPATIENT
Start: 2022-08-25 | End: 2022-08-25

## 2022-08-25 RX ORDER — LIDOCAINE HYDROCHLORIDE AND EPINEPHRINE 10; 10 MG/ML; UG/ML
20 INJECTION, SOLUTION INFILTRATION; PERINEURAL ONCE
Status: DISCONTINUED | OUTPATIENT
Start: 2022-08-25 | End: 2022-08-26 | Stop reason: HOSPADM

## 2022-08-25 RX ORDER — LABETALOL HYDROCHLORIDE 5 MG/ML
10 INJECTION, SOLUTION INTRAVENOUS ONCE
Status: COMPLETED | OUTPATIENT
Start: 2022-08-25 | End: 2022-08-25

## 2022-08-25 RX ORDER — CALCIUM CARBONATE 200(500)MG
750 TABLET,CHEWABLE ORAL 3 TIMES DAILY PRN
Status: DISCONTINUED | OUTPATIENT
Start: 2022-08-25 | End: 2022-08-26 | Stop reason: HOSPADM

## 2022-08-25 RX ORDER — ACETAMINOPHEN 650 MG
TABLET, EXTENDED RELEASE ORAL PRN
Status: DISCONTINUED | OUTPATIENT
Start: 2022-08-25 | End: 2022-08-26 | Stop reason: HOSPADM

## 2022-08-25 RX ADMIN — BUMETANIDE 1 MG: 1 TABLET ORAL at 10:11

## 2022-08-25 RX ADMIN — METHOCARBAMOL TABLETS 1000 MG: 500 TABLET, COATED ORAL at 20:08

## 2022-08-25 RX ADMIN — INSULIN LISPRO 2 UNITS: 100 INJECTION, SOLUTION INTRAVENOUS; SUBCUTANEOUS at 20:26

## 2022-08-25 RX ADMIN — Medication 10 ML: at 10:30

## 2022-08-25 RX ADMIN — HYDRALAZINE HYDROCHLORIDE 50 MG: 50 TABLET, FILM COATED ORAL at 15:11

## 2022-08-25 RX ADMIN — GABAPENTIN 100 MG: 100 CAPSULE ORAL at 10:11

## 2022-08-25 RX ADMIN — GABAPENTIN 100 MG: 100 CAPSULE ORAL at 20:08

## 2022-08-25 RX ADMIN — TRAMADOL HYDROCHLORIDE 100 MG: 50 TABLET, COATED ORAL at 10:12

## 2022-08-25 RX ADMIN — TRAMADOL HYDROCHLORIDE 100 MG: 50 TABLET, COATED ORAL at 15:11

## 2022-08-25 RX ADMIN — SODIUM CHLORIDE 3000 MG: 900 INJECTION INTRAVENOUS at 01:51

## 2022-08-25 RX ADMIN — LABETALOL HYDROCHLORIDE 10 MG: 5 INJECTION INTRAVENOUS at 15:10

## 2022-08-25 RX ADMIN — DULOXETINE HYDROCHLORIDE 60 MG: 60 CAPSULE, DELAYED RELEASE ORAL at 20:08

## 2022-08-25 RX ADMIN — MONTELUKAST SODIUM 10 MG: 10 TABLET ORAL at 20:08

## 2022-08-25 RX ADMIN — SODIUM CHLORIDE 3000 MG: 900 INJECTION INTRAVENOUS at 15:12

## 2022-08-25 RX ADMIN — Medication 1000 UNITS: at 10:10

## 2022-08-25 RX ADMIN — ARFORMOTEROL TARTRATE 15 MCG: 15 SOLUTION RESPIRATORY (INHALATION) at 09:01

## 2022-08-25 RX ADMIN — SODIUM CHLORIDE 3000 MG: 900 INJECTION INTRAVENOUS at 10:00

## 2022-08-25 RX ADMIN — BUDESONIDE 500 MCG: 0.5 SUSPENSION RESPIRATORY (INHALATION) at 19:34

## 2022-08-25 RX ADMIN — LIDOCAINE HYDROCHLORIDE: 20 SOLUTION ORAL; TOPICAL at 20:40

## 2022-08-25 RX ADMIN — SODIUM CHLORIDE 3000 MG: 900 INJECTION INTRAVENOUS at 20:20

## 2022-08-25 RX ADMIN — TRAMADOL HYDROCHLORIDE 100 MG: 50 TABLET, COATED ORAL at 04:17

## 2022-08-25 RX ADMIN — TRAZODONE HYDROCHLORIDE 100 MG: 50 TABLET ORAL at 20:08

## 2022-08-25 RX ADMIN — METHOCARBAMOL TABLETS 1000 MG: 500 TABLET, COATED ORAL at 10:09

## 2022-08-25 RX ADMIN — FAMOTIDINE 40 MG: 20 TABLET ORAL at 17:00

## 2022-08-25 RX ADMIN — METHOCARBAMOL TABLETS 1000 MG: 500 TABLET, COATED ORAL at 17:00

## 2022-08-25 RX ADMIN — ARFORMOTEROL TARTRATE 15 MCG: 15 SOLUTION RESPIRATORY (INHALATION) at 19:34

## 2022-08-25 RX ADMIN — SACUBITRIL AND VALSARTAN 1 TABLET: 24; 26 TABLET, FILM COATED ORAL at 20:08

## 2022-08-25 RX ADMIN — DOCUSATE SODIUM 200 MG: 100 CAPSULE, LIQUID FILLED ORAL at 20:08

## 2022-08-25 RX ADMIN — BUDESONIDE 500 MCG: 0.5 SUSPENSION RESPIRATORY (INHALATION) at 09:01

## 2022-08-25 RX ADMIN — CALCIUM CARBONATE 500 MG: 500 TABLET, CHEWABLE ORAL at 15:11

## 2022-08-25 RX ADMIN — DULOXETINE HYDROCHLORIDE 60 MG: 60 CAPSULE, DELAYED RELEASE ORAL at 10:10

## 2022-08-25 RX ADMIN — PANTOPRAZOLE SODIUM 40 MG: 40 TABLET, DELAYED RELEASE ORAL at 10:12

## 2022-08-25 RX ADMIN — TRAMADOL HYDROCHLORIDE 100 MG: 50 TABLET, COATED ORAL at 21:06

## 2022-08-25 RX ADMIN — SACUBITRIL AND VALSARTAN 1 TABLET: 24; 26 TABLET, FILM COATED ORAL at 10:11

## 2022-08-25 RX ADMIN — METHOCARBAMOL TABLETS 1000 MG: 500 TABLET, COATED ORAL at 15:11

## 2022-08-25 RX ADMIN — Medication 10 ML: at 20:17

## 2022-08-25 RX ADMIN — ISOSORBIDE MONONITRATE 60 MG: 60 TABLET ORAL at 10:30

## 2022-08-25 RX ADMIN — CARVEDILOL 25 MG: 25 TABLET, FILM COATED ORAL at 17:00

## 2022-08-25 RX ADMIN — CARVEDILOL 25 MG: 25 TABLET, FILM COATED ORAL at 10:11

## 2022-08-25 RX ADMIN — CETIRIZINE HYDROCHLORIDE 10 MG: 10 TABLET, FILM COATED ORAL at 10:11

## 2022-08-25 RX ADMIN — HYDRALAZINE HYDROCHLORIDE 50 MG: 50 TABLET, FILM COATED ORAL at 10:10

## 2022-08-25 RX ADMIN — INSULIN GLARGINE 20 UNITS: 100 INJECTION, SOLUTION SUBCUTANEOUS at 20:30

## 2022-08-25 RX ADMIN — HYDRALAZINE HYDROCHLORIDE 50 MG: 50 TABLET, FILM COATED ORAL at 20:07

## 2022-08-25 ASSESSMENT — PAIN DESCRIPTION - DESCRIPTORS
DESCRIPTORS: STABBING;DISCOMFORT
DESCRIPTORS: DISCOMFORT;SHARP
DESCRIPTORS: SHARP;DISCOMFORT

## 2022-08-25 ASSESSMENT — PAIN DESCRIPTION - ORIENTATION
ORIENTATION: MID

## 2022-08-25 ASSESSMENT — PAIN SCALES - GENERAL
PAINLEVEL_OUTOF10: 9
PAINLEVEL_OUTOF10: 9
PAINLEVEL_OUTOF10: 10
PAINLEVEL_OUTOF10: 10
PAINLEVEL_OUTOF10: 9
PAINLEVEL_OUTOF10: 10

## 2022-08-25 ASSESSMENT — PAIN DESCRIPTION - FREQUENCY
FREQUENCY: CONTINUOUS
FREQUENCY: CONTINUOUS

## 2022-08-25 ASSESSMENT — PAIN DESCRIPTION - LOCATION
LOCATION: ABDOMEN

## 2022-08-25 ASSESSMENT — PAIN DESCRIPTION - PAIN TYPE
TYPE: ACUTE PAIN
TYPE: ACUTE PAIN

## 2022-08-25 ASSESSMENT — PAIN DESCRIPTION - ONSET
ONSET: ON-GOING
ONSET: ON-GOING

## 2022-08-25 NOTE — PROCEDURES
BEDSIDE DRAINAGE PROCEDURE NOTE    The patient was prepped and draped in the usual fashion. Using a 10 cc syringe and a 21-gauge needle, the cavity of interest was accessed and aspirated. Withdrew 12 cc of sterile hematoma. The patient tolerated the procedure well. Continue antibiotics x24 hours due to accessing sterile hematoma. Dr. Narciso Akins was present for the entire procedure. Electronically signed by Mally Ivey MD on 8/25/2022 at 6:58 PM      ATTENDING PHYSICIAN PROGRESS NOTE      I have examined the patient, reviewed the record, and discussed the case with the Resident. I have reviewed all relevant labs and imaging data. Please refer to the resident's note. I agree with the assessment and plan with the following corrections/ additions. The following summarizes my clinical findings and independent assessment.      Imer Canseco MD

## 2022-08-25 NOTE — PROGRESS NOTES
GENERAL SURGERY  DAILY PROGRESS NOTE  8/25/2022    Chief Complaint   Patient presents with    Shortness of Breath     Had hernia repair surgery on 8/11, since then she has had increase in shortness of breath, and states the pain is in her lower abdominal area, she states that she is moving her bowels without difficulty and has been having an increase in urination, a lot of pressure in her lower abdominal area        Subjective:  Pt states she is still having abdominal pain around the umbilicus. Denies any fevers or chills.     Objective:  /66   Pulse 62   Temp 97.7 °F (36.5 °C) (Axillary)   Resp 14   Ht 5' 1\" (1.549 m)   Wt 221 lb 3.2 oz (100.3 kg)   LMP 01/01/1990   SpO2 100%   BMI 41.80 kg/m²     GENERAL:  Laying in bed, awake, alert, cooperative, no apparent distress  HEAD: Normocephalic, atraumatic  EYES: No sclera icterus, pupils equal  LUNGS:  On BiPAP at night  CARDIOVASCULAR:  RR and normotensive  ABDOMEN:  Soft, ecchymosis around umbilicus, moderate TTP over epigastric area, non-distended  EXTREMITIES: No edema or swelling  SKIN: Warm and dry    Assessment/Plan:  78 y.o. female with post-op hernia fluid collection, likely seroma but possible abscess    - Okay for regular diet as tolerated  - Pain control PRN  - Plan for bedside aspiration of fluid  - Pending consistency and appearance of fluid, may send for culture and gram stain  - Continue abx    Electronically signed by Kelsie La MD on 8/25/2022 at 8:06 AM

## 2022-08-25 NOTE — PROGRESS NOTES
Date: 8/24/2022    Time: 10:06 PM    Patient Placed On BIPAP/CPAP/ Non-Invasive Ventilation? Yes    If no must comment. Facial area red/color change? No           If YES are Blister/Lesion present? No   If yes must notify nursing staff  BIPAP/CPAP skin barrier?   Yes    Skin barrier type:mepilexlite       Comments:        Joseph Godfrey RCP

## 2022-08-25 NOTE — PROGRESS NOTES
Internal Medicine Progress Note    Patient's name: Antony Childress  : 1955  Chief complaints (on day of admission): Shortness of Breath (Had hernia repair surgery on , since then she has had increase in shortness of breath, and states the pain is in her lower abdominal area, she states that she is moving her bowels without difficulty and has been having an increase in urination, a lot of pressure in her lower abdominal area )  Admission date: 2022  Date of service: 2022   Room: [address] INTERNAL MEDICINE 2  Primary care physician: Joaquín Martinez DO  Reason for visit: Follow-up for medical mgmt     Subjective  Nicol Martínez was seen and examined at bedside     Family at bedside   Numerous complaints per patient today all vague and non specific   She had numerous questions which were all answered to the best of my ability   Discussed with nursing staff   Planning for bedside procedure later per surgery    Current treatment plan discussed and all questions answered     Current medications being prescribed discussed and patient expresses verbal understanding     Review of Systems  There are no new complaints of chest pain, shortness of breath, abdominal pain, nausea, vomiting, diarrhea, constipation unless otherwise mentioned above.      Hospital Medications  Current Facility-Administered Medications   Medication Dose Route Frequency Provider Last Rate Last Admin    lidocaine-EPINEPHrine 1 %-1:[id number] injection 20 mL  20 mL IntraDERmal Once Nitza Bowling MD        povidone-iodine (BETADINE) 10 % external solution   Topical PRN Nitza Bowling MD        acetaminophen (TYLENOL) tablet 650 mg  650 mg Oral Q4H PRN Juan C Hayes MD   650 mg at 22 1740    traMADol (ULTRAM) tablet 50 mg  50 mg Oral Q6H PRN Nitza Bowling MD        Or    traMADol Loman Mettle) tablet 100 mg  100 mg Oral Q6H PRN Nitza Bowling MD   100 mg at 22 0417    methocarbamol (ROBAXIN) tablet 1,000 mg  1,000 mg Oral 4x Daily Leigh Suazo MD   1,000 mg at 08/24/22 2005    vancomycin 2000 mg in dextrose 5% 500 ml IVPB  20 mg/kg IntraVENous Once Neldon Goldmann, MD        [facility] [place] by provider] ketorolac (TORADOL) injection 15 mg  15 mg IntraVENous Q6H Bipin Nava MD   15 mg at 08/24/22 6431    ampicillin-sulbactam (UNASYN) 3000 mg in 100 mL NS IVPB minibag  3,000 mg IntraVENous Q6H Bipin Nava MD   Stopped at 08/25/22 0411    bumetanide (BUMEX) tablet 1 mg  1 mg Oral Daily Oneyda Rugby, APRN - CNP   1 mg at 08/24/22 0747    carvedilol (COREG) tablet 25 mg  25 mg Oral BID WC Oneyda Rugby, APRN - CNP   25 mg at 08/24/22 1616    cetirizine (ZYRTEC) tablet 10 mg  10 mg Oral Daily Oneyda Rugby, APRN - CNP   10 mg at 08/24/22 0747    docusate sodium (COLACE) capsule 200 mg  200 mg Oral Nightly Oneyad Rugby, APRN - CNP   200 mg at 08/24/22 2003    DULoxetine (CYMBALTA) extended release capsule 60 mg  60 mg Oral BID Oneyda Rugby, APRN - CNP   60 mg at 08/24/22 2004    famotidine (PEPCID) tablet 40 mg  40 mg Oral Dinner Oneyda Rugby, APRN - CNP   40 mg at 08/24/22 1616    fluticasone (FLONASE) 50 MCG/ACT nasal spray 2 spray  2 spray Each Nostril Daily Oneyda Rugby, APRN - CNP   2 spray at 08/24/22 0746    gabapentin (NEURONTIN) capsule 100 mg  100 mg Oral BID Oneyda Rugby, APRN - CNP   100 mg at 08/24/22 2004    hydrALAZINE (APRESOLINE) tablet 50 mg  50 mg Oral TID Oneyda Rugby, APRN - CNP   50 mg at 08/24/22 2005    insulin glargine (LANTUS) injection vial 20 Units  20 Units SubCUTAneous BID Oneyda Rugby, APRN - CNP   20 Units at 08/24/22 2023    isosorbide mononitrate (IMDUR) extended release tablet 60 mg  60 mg Oral Daily Oneyda Renee APRN - CNP   60 mg at 08/24/22 0747    mirabegron (MYRBETRIQ) extended release tablet 50 mg (Patient Supplied)  50 mg Oral Nightly Oneyda Thelma, APRN - CNP        montelukast (SINGULAIR) tablet 10 mg  10 mg Oral Nightly Oneyda Thelma, APRN - CNP   10 mg at 08/24/22 2005    nitroGLYCERIN (NITROSTAT) SL tablet 0.4 mg  0.4 mg SubLINGual Q5 Min PRN PATRIZIA Colbert CNP        pantoprazole (PROTONIX) tablet 40 mg  40 mg Oral Daily PATRIZIA Colbert CNP   40 mg at 08/24/22 0747    sacubitril-valsartan (ENTRESTO) 24-26 MG per tablet 1 tablet  1 tablet Oral BID PATRIZIA Colbert CNP   1 tablet at 08/24/22 2006    simvastatin (ZOCOR) tablet 20 mg (Patient Supplied)  20 mg Oral Nightly PATRIZIA Colbert CNP        traZODone (DESYREL) tablet 100 mg  100 mg Oral Nightly PATRIZIA Colbert CNP   100 mg at 08/24/22 2004    vitamin D (CHOLECALCIFEROL) tablet 1,000 Units  1,000 Units Oral Daily PATRIZIA Colbert CNP   1,000 Units at 08/24/22 0747    Revefenacin SOLN 3 mL (Patient Supplied)  3 mL Inhalation Daily PATRIZIA Colbert CNP        glucose chewable tablet 16 g  4 tablet Oral PRN PATRIZIA Colbert CNP        dextrose bolus 10% 125 mL  125 mL IntraVENous PRN PATRIZIA Colbert CNP        Or    dextrose bolus 10% 250 mL  250 mL IntraVENous PRN PATRIZIA Colbert CNP        glucagon (rDNA) injection 1 mg  1 mg SubCUTAneous PRN PATRIZIA Colbert CNP        dextrose 10 % infusion   IntraVENous Continuous PRN PATRIZIA Colbert CNP        insulin lispro (HUMALOG) injection vial 0-8 Units  0-8 Units SubCUTAneous Q4H PATRIZIA Colbert CNP   2 Units at 08/24/22 1617    sodium chloride flush 0.9 % injection 10 mL  10 mL IntraVENous 2 times per day PATRIZIA Colbert CNP   10 mL at 08/24/22 2017    sodium chloride flush 0.9 % injection 10 mL  10 mL IntraVENous PRN PATRIZIA Colbert CNP        0.9 % sodium chloride infusion   IntraVENous PRN PATRIZIA Colbert CNP        potassium chloride (KLOR-CON M) extended release tablet 40 mEq  40 mEq Oral PRN PATRIZIA Colbert CNP        Or    potassium bicarb-citric acid (EFFER-K) effervescent tablet 40 mEq  40 mEq Oral PRN PATRIZIA Colbert CNP Or    potassium chloride 10 mEq/100 mL IVPB (Peripheral Line)  10 mEq IntraVENous PRN Wily Margoth, APRN - CNP        budesonide (PULMICORT) nebulizer suspension 500 mcg  0.5 mg Nebulization BID Wily Margoth, APRN - CNP   500 mcg at 08/24/22 2046    Arformoterol Tartrate (BROVANA) nebulizer solution 15 mcg  15 mcg Nebulization BID Wily Margoth, APRN - CNP   15 mcg at 08/24/22 2046       PRN Medications  povidone-iodine, acetaminophen, traMADol **OR** traMADol, nitroGLYCERIN, glucose, dextrose bolus **OR** dextrose bolus, glucagon (rDNA), dextrose, sodium chloride flush, sodium chloride, potassium chloride **OR** potassium alternative oral replacement **OR** potassium chloride    Objective  Most Recent Recorded Vitals  /66   Pulse 62   Temp 97.7 °F (36.5 °C) (Axillary)   Resp 14   Ht 5' 1\" (1.549 m)   Wt 221 lb 3.2 oz (100.3 kg)   LMP 01/01/1990   SpO2 100%   BMI 41.80 kg/m²   No intake/output data recorded. No intake/output data recorded.     Physical Exam:  General: AAO to person/place/time/purpose, NAD, no labored breathing, morbidly obese   Eyes: conjunctivae/corneas clear, sclera non icteric  Skin: color/texture/turgor normal, no rashes or lesions  Lungs: CTAB, no retractions/use of accessory muscles, no vocal fremitus, no rhonchi, no crackle, no rales  Heart: regular rate, regular rhythm, no murmur  Abdomen: soft, pain on palpation diffusely , bowel sounds normal  Extremities: atraumatic, no edema  Neurologic: cranial nerves 2-12 grossly intact, no slurred speech    Most Recent Labs  Lab Results   Component Value Date    WBC 9.6 08/24/2022    HGB 12.0 08/24/2022    HCT 38.3 08/24/2022     08/24/2022     08/25/2022    K 4.8 08/25/2022     08/25/2022    CREATININE 1.4 (H) 08/25/2022    BUN 33 (H) 08/25/2022    CO2 26 08/25/2022    GLUCOSE 114 (H) 08/25/2022    ALT 13 08/25/2022    AST 14 08/25/2022    INR 1.0 08/23/2022    TSH 0.646 02/05/2021    LABA1C 6.7 (H) 08/24/2022    LABMICR 116.9 (H) 02/05/2021       CT ABDOMEN PELVIS WO CONTRAST Additional Contrast? None   Final Result   6.5 cm x 6.5 cm x 3.5 cm gas and fluid containing thick-walled collection in   the ventral abdominal wall subcutaneous fat to the right of midline   consistent with abscess and adjacent fat containing hernia. .         XR CHEST PORTABLE   Final Result   Early CHF.              Echocardiogram       Assessment   Active Hospital Problems    Diagnosis     Coronary artery disease involving native coronary artery of native heart without angina pectoris [I25.10]      Priority: High    Postoperative abscess [T81.49XA]      Priority: Medium    DM2 (diabetes mellitus, type 2) ([facility]) [E11.9]      Priority: Medium    DAYAN and COPD overlap syndrome ([facility] [G47.33, J44.9]      Priority: Medium    Cigarette smoker [F17.210]      Priority: Low    Marijuana use, smoked [F12.90]      Priority: Low    CKD (chronic kidney disease) stage 3, GFR 30-59 ml/min ([facility]) [N18.30]     Hypertensive emergency [I16.1]     Acute on chronic diastolic (congestive) heart failure ([facility]) [I50.33]     History of stroke [Z86.73]     Hiatal hernia [K44.9]     Glaucoma, open angle [H40.10X0]     Major depressive disorder, recurrent episode, mild ([facility]) [F33.0]     GERD (gastroesophageal reflux disease) [K21.9]     Hyperlipidemia [E78.5]          Plan  Post operative seroma vs abscess   General surgery admitted the patient   Abx per surgery at this time   Check inflammatory markers   Possible bed side drainage   CKD  Baseline is around 1.2-1.3  Monitor daily   Avoid nephrotoxins, I would recommend against Toradol at this time I have placed on hold   Stop IVFs   Type 2 DM   Goal is 140-180 while in patient   Titrate insulin as needed   Hypoglycemia protocol   COPD  Continue Pulmicort and Brovana nebs   Monitor     DVT prophylaxis per surgery  Code status full  Medications, labs and imaging reviewed   Discharge plan: Per surgery    Electronically signed by Landen Norton MD on 8/25/2022 at 8:36 AM    I can be reached through [facility].

## 2022-08-25 NOTE — CARE COORDINATION
Plan for bedside aspiration of fluid - s/p hernia repair 8/11 per surgery note. Continues on iv abx-await final abx plan. [facility] following- will need HHC order on discharge. Requiring AVAPS HS vs O2 3lNC- has home non invasive vent PATRICK w/ 4liter O2 bleed provided by [facility]. Has home nebulizer. PT am-pac 20. Plan remains to return home alone on discharge- has aide 7 days /week through [facility]. Has rollator , [employer], and glucometer. Pt's aid or her grandson will provide transportation home.  Will follow Uemsh Noble RNcase manager

## 2022-08-26 VITALS
WEIGHT: 219.7 LBS | HEIGHT: 61 IN | HEART RATE: 57 BPM | DIASTOLIC BLOOD PRESSURE: 84 MMHG | OXYGEN SATURATION: 94 % | TEMPERATURE: 97.7 F | SYSTOLIC BLOOD PRESSURE: 154 MMHG | RESPIRATION RATE: 18 BRPM | BODY MASS INDEX: 41.48 KG/M2

## 2022-08-26 LAB
ALBUMIN SERPL-MCNC: 3.5 G/DL (ref 3.5–5.2)
ALP BLD-CCNC: 60 U/L (ref 35–104)
ALT SERPL-CCNC: 12 U/L (ref 0–32)
ANION GAP SERPL CALCULATED.3IONS-SCNC: 8 MMOL/L (ref 7–16)
AST SERPL-CCNC: 9 U/L (ref 0–31)
BILIRUB SERPL-MCNC: 0.3 MG/DL (ref 0–1.2)
BUN BLDV-MCNC: 35 MG/DL (ref 6–23)
CALCIUM SERPL-MCNC: 8.8 MG/DL (ref 8.6–10.2)
CHLORIDE BLD-SCNC: 99 MMOL/L (ref 98–107)
CO2: 29 MMOL/L (ref 22–29)
CREAT SERPL-MCNC: 1.4 MG/DL (ref 0.5–1)
GFR AFRICAN AMERICAN: 45
GFR NON-AFRICAN AMERICAN: 45 ML/MIN/1.73
GLUCOSE BLD-MCNC: 122 MG/DL (ref 74–99)
METER GLUCOSE: 106 MG/DL (ref 74–99)
METER GLUCOSE: 148 MG/DL (ref 74–99)
METER GLUCOSE: 155 MG/DL (ref 74–99)
METER GLUCOSE: 160 MG/DL (ref 74–99)
METER GLUCOSE: 311 MG/DL (ref 74–99)
METER GLUCOSE: 455 MG/DL (ref 74–99)
POTASSIUM REFLEX MAGNESIUM: 4.7 MMOL/L (ref 3.5–5)
SODIUM BLD-SCNC: 136 MMOL/L (ref 132–146)
TOTAL PROTEIN: 6.3 G/DL (ref 6.4–8.3)

## 2022-08-26 PROCEDURE — 94640 AIRWAY INHALATION TREATMENT: CPT

## 2022-08-26 PROCEDURE — 36415 COLL VENOUS BLD VENIPUNCTURE: CPT

## 2022-08-26 PROCEDURE — 6370000000 HC RX 637 (ALT 250 FOR IP): Performed by: NURSE PRACTITIONER

## 2022-08-26 PROCEDURE — 94660 CPAP INITIATION&MGMT: CPT

## 2022-08-26 PROCEDURE — 82962 GLUCOSE BLOOD TEST: CPT

## 2022-08-26 PROCEDURE — 80053 COMPREHEN METABOLIC PANEL: CPT

## 2022-08-26 PROCEDURE — 2580000003 HC RX 258: Performed by: SURGERY

## 2022-08-26 PROCEDURE — 2700000000 HC OXYGEN THERAPY PER DAY

## 2022-08-26 PROCEDURE — 6360000002 HC RX W HCPCS: Performed by: NURSE PRACTITIONER

## 2022-08-26 PROCEDURE — 0J983ZZ DRAINAGE OF ABDOMEN SUBCUTANEOUS TISSUE AND FASCIA, PERCUTANEOUS APPROACH: ICD-10-PCS | Performed by: SURGERY

## 2022-08-26 PROCEDURE — 6370000000 HC RX 637 (ALT 250 FOR IP): Performed by: SURGERY

## 2022-08-26 PROCEDURE — 2580000003 HC RX 258: Performed by: NURSE PRACTITIONER

## 2022-08-26 PROCEDURE — 6360000002 HC RX W HCPCS: Performed by: SURGERY

## 2022-08-26 PROCEDURE — 6370000000 HC RX 637 (ALT 250 FOR IP): Performed by: STUDENT IN AN ORGANIZED HEALTH CARE EDUCATION/TRAINING PROGRAM

## 2022-08-26 RX ORDER — TRAMADOL HYDROCHLORIDE 50 MG/1
100 TABLET ORAL EVERY 6 HOURS PRN
Status: DISCONTINUED | OUTPATIENT
Start: 2022-08-26 | End: 2022-08-26 | Stop reason: HOSPADM

## 2022-08-26 RX ORDER — FENTANYL CITRATE 50 UG/ML
25 INJECTION, SOLUTION INTRAMUSCULAR; INTRAVENOUS
Status: DISCONTINUED | OUTPATIENT
Start: 2022-08-26 | End: 2022-08-26 | Stop reason: HOSPADM

## 2022-08-26 RX ORDER — METHOCARBAMOL 500 MG/1
1000 TABLET, FILM COATED ORAL 4 TIMES DAILY
Qty: 80 TABLET | Refills: 0 | Status: SHIPPED | OUTPATIENT
Start: 2022-08-26 | End: 2022-09-05

## 2022-08-26 RX ORDER — TRAMADOL HYDROCHLORIDE 50 MG/1
100 TABLET ORAL EVERY 6 HOURS PRN
Qty: 12 TABLET | Refills: 0 | Status: SHIPPED | OUTPATIENT
Start: 2022-08-26 | End: 2022-08-29

## 2022-08-26 RX ADMIN — BUMETANIDE 1 MG: 1 TABLET ORAL at 08:18

## 2022-08-26 RX ADMIN — CARVEDILOL 25 MG: 25 TABLET, FILM COATED ORAL at 08:18

## 2022-08-26 RX ADMIN — BUDESONIDE 500 MCG: 0.5 SUSPENSION RESPIRATORY (INHALATION) at 09:12

## 2022-08-26 RX ADMIN — CALCIUM CARBONATE 750 MG: 500 TABLET, CHEWABLE ORAL at 08:19

## 2022-08-26 RX ADMIN — PANTOPRAZOLE SODIUM 40 MG: 40 TABLET, DELAYED RELEASE ORAL at 08:19

## 2022-08-26 RX ADMIN — INSULIN GLARGINE 20 UNITS: 100 INJECTION, SOLUTION SUBCUTANEOUS at 08:26

## 2022-08-26 RX ADMIN — ISOSORBIDE MONONITRATE 60 MG: 60 TABLET ORAL at 08:19

## 2022-08-26 RX ADMIN — CETIRIZINE HYDROCHLORIDE 10 MG: 10 TABLET, FILM COATED ORAL at 08:18

## 2022-08-26 RX ADMIN — SACUBITRIL AND VALSARTAN 1 TABLET: 24; 26 TABLET, FILM COATED ORAL at 08:17

## 2022-08-26 RX ADMIN — Medication 1000 UNITS: at 08:18

## 2022-08-26 RX ADMIN — METHOCARBAMOL TABLETS 1000 MG: 500 TABLET, COATED ORAL at 08:17

## 2022-08-26 RX ADMIN — ARFORMOTEROL TARTRATE 15 MCG: 15 SOLUTION RESPIRATORY (INHALATION) at 09:12

## 2022-08-26 RX ADMIN — TRAMADOL HYDROCHLORIDE 100 MG: 50 TABLET, COATED ORAL at 08:19

## 2022-08-26 RX ADMIN — DULOXETINE HYDROCHLORIDE 60 MG: 60 CAPSULE, DELAYED RELEASE ORAL at 08:18

## 2022-08-26 RX ADMIN — Medication 10 ML: at 08:17

## 2022-08-26 RX ADMIN — GABAPENTIN 100 MG: 100 CAPSULE ORAL at 08:19

## 2022-08-26 RX ADMIN — HYDRALAZINE HYDROCHLORIDE 50 MG: 50 TABLET, FILM COATED ORAL at 08:18

## 2022-08-26 RX ADMIN — SODIUM CHLORIDE 3000 MG: 900 INJECTION INTRAVENOUS at 08:24

## 2022-08-26 RX ADMIN — SODIUM CHLORIDE 3000 MG: 900 INJECTION INTRAVENOUS at 01:58

## 2022-08-26 ASSESSMENT — PAIN - FUNCTIONAL ASSESSMENT: PAIN_FUNCTIONAL_ASSESSMENT: ACTIVITIES ARE NOT PREVENTED

## 2022-08-26 ASSESSMENT — PAIN DESCRIPTION - ONSET: ONSET: ON-GOING

## 2022-08-26 ASSESSMENT — PAIN DESCRIPTION - ORIENTATION: ORIENTATION: MID

## 2022-08-26 ASSESSMENT — PAIN DESCRIPTION - FREQUENCY: FREQUENCY: CONTINUOUS

## 2022-08-26 ASSESSMENT — PAIN SCALES - GENERAL
PAINLEVEL_OUTOF10: 9
PAINLEVEL_OUTOF10: 9

## 2022-08-26 ASSESSMENT — PAIN DESCRIPTION - LOCATION: LOCATION: ABDOMEN

## 2022-08-26 ASSESSMENT — PAIN DESCRIPTION - DESCRIPTORS: DESCRIPTORS: STABBING;DISCOMFORT

## 2022-08-26 ASSESSMENT — PAIN DESCRIPTION - PAIN TYPE: TYPE: ACUTE PAIN

## 2022-08-26 NOTE — PROGRESS NOTES
PATRIZIA López CNP        montelukast (SINGULAIR) tablet 10 mg  10 mg Oral Nightly PATRIZIA López CNP   10 mg at 08/25/22 2008    nitroGLYCERIN (NITROSTAT) SL tablet 0.4 mg  0.4 mg SubLINGual Q5 Min PRN PATRIZIA López CNP        pantoprazole (PROTONIX) tablet 40 mg  40 mg Oral Daily PATRIZIA López CNP   40 mg at 08/26/22 0819    sacubitril-valsartan (ENTRESTO) 24-26 MG per tablet 1 tablet  1 tablet Oral BID PATRIZIA López CNP   1 tablet at 08/26/22 0817    simvastatin (ZOCOR) tablet 20 mg (Patient Supplied)  20 mg Oral Nightly PATRIZIA López CNP        traZODone (DESYREL) tablet 100 mg  100 mg Oral Nightly PATRIZIA López CNP   100 mg at 08/25/22 2008    vitamin D (CHOLECALCIFEROL) tablet 1,000 Units  1,000 Units Oral Daily PATRIZIA López CNP   1,000 Units at 08/26/22 0818    Revefenacin SOLN 3 mL (Patient Supplied)  3 mL Inhalation Daily PATRIZIA López CNP        glucose chewable tablet 16 g  4 tablet Oral PRN PATRIZIA López CNP        dextrose bolus 10% 125 mL  125 mL IntraVENous PRN PATRIZIA López CNP        Or    dextrose bolus 10% 250 mL  250 mL IntraVENous PRN PATRIZIA López CNP        glucagon (rDNA) injection 1 mg  1 mg SubCUTAneous PRN PATRIZIA López CNP        dextrose 10 % infusion   IntraVENous Continuous PRN PATRIZIA López CNP        insulin lispro (HUMALOG) injection vial 0-8 Units  0-8 Units SubCUTAneous Q4H PATRIZIA López CNP   2 Units at 08/25/22 2026    sodium chloride flush 0.9 % injection 10 mL  10 mL IntraVENous 2 times per day PATRIZIA López CNP   10 mL at 08/26/22 0817    sodium chloride flush 0.9 % injection 10 mL  10 mL IntraVENous PRN PATRIZIA López CNP        0.9 % sodium chloride infusion   IntraVENous PRN Jeneen Lull, APRN - CNP        potassium chloride (KLOR-CON M) extended release tablet 40 mEq  40 mEq Oral PRN Elmer Mejia, APRN - CNP        Or    potassium bicarb-citric acid (EFFER-K) effervescent tablet 40 mEq  40 mEq Oral PRN [facility], APRJOSÉ LUIS - CNP        Or    potassium chloride 10 mEq/100 mL IVPB (Peripheral Line)  10 mEq IntraVENous PRN [facility], APRJOSÉ LUIS - CNP        budesonide (PULMICORT) nebulizer suspension 500 mcg  0.5 mg Nebulization BID [facility], APRN - CNP   500 mcg at 08/26/22 0912    Arformoterol Tartrate (BROVANA) nebulizer solution 15 mcg  15 mcg Nebulization BID [facility], APRJOSÉ LUIS - CNP   15 mcg at 08/26/22 0912       PRN Medications  fentanNYL, [DISCONTINUED] traMADol **OR** traMADol, povidone-iodine, calcium carbonate, acetaminophen, nitroGLYCERIN, glucose, dextrose bolus **OR** dextrose bolus, glucagon (rDNA), dextrose, sodium chloride flush, sodium chloride, potassium chloride **OR** potassium alternative oral replacement **OR** potassium chloride    Objective  Most Recent Recorded Vitals  BP (!) 154/84   Pulse 57   Temp 97.7 °F (36.5 °C) (Axillary)   Resp 18   Ht 5' 1\" (1.549 m)   Wt 219 lb 11.2 oz (99.7 kg)   LMP 01/01/1990   SpO2 100%   BMI 41.51 kg/m²   I/O last 3 completed shifts: In: 600 [P.O.:600]  Out: -   No intake/output data recorded.     Physical Exam:  General: AAO to person/place/time/purpose, NAD, no labored breathing, morbidly obese, 3L O2 via NC  Eyes: conjunctivae/corneas clear, sclera non icteric  Skin: color/texture/turgor normal, no rashes or lesions  Lungs: CTAB, no retractions/use of accessory muscles, no vocal fremitus, no rhonchi, no crackle, no rales  Heart: regular rate, regular rhythm, no murmur, reproducible chest wall ttp  Abdomen: soft, pain on palpation over midline and right side (minimal over left side), bowel sounds normal, bandage in place over umbilical region that is clean and dry  Extremities: atraumatic, no edema  Neurologic: cranial nerves 2-12 grossly intact, no slurred speech    Most Recent Labs  Lab Results   Component Value Date    WBC 9.6 08/24/2022    HGB 12.0 08/24/2022    HCT 38.3 08/24/2022     08/24/2022     08/26/2022    K 4.7 08/26/2022    CL 99 08/26/2022    CREATININE 1.4 (H) 08/26/2022    BUN 35 (H) 08/26/2022    CO2 29 08/26/2022    GLUCOSE 122 (H) 08/26/2022    ALT 12 08/26/2022    AST 9 08/26/2022    INR 1.0 08/23/2022    TSH 0.646 02/05/2021    LABA1C 6.7 (H) 08/24/2022    LABMICR 116.9 (H) 02/05/2021       CT ABDOMEN PELVIS WO CONTRAST Additional Contrast? None   Final Result   6.5 cm x 6.5 cm x 3.5 cm gas and fluid containing thick-walled collection in   the ventral abdominal wall subcutaneous fat to the right of midline   consistent with abscess and adjacent fat containing hernia. .         XR CHEST PORTABLE   Final Result   Early CHF.              Echocardiogram       Assessment   Active Hospital Problems    Diagnosis     Coronary artery disease involving native coronary artery of native heart without angina pectoris [I25.10]      Priority: High    Postoperative abscess [T81.49XA]      Priority: Medium    DM2 (diabetes mellitus, type 2) ([facility]) [E11.9]      Priority: Medium    DAYAN and COPD overlap syndrome ([facility] [G47.33, J44.9]      Priority: Medium    Cigarette smoker [F17.210]      Priority: Low    Marijuana use, smoked [F12.90]      Priority: Low    CKD (chronic kidney disease) stage 3, GFR 30-59 ml/min ([facility]) [N18.30]     Hypertensive emergency [I16.1]     Acute on chronic diastolic (congestive) heart failure ([facility]) [I50.33]     History of stroke [Z86.73]     Hiatal hernia [K44.9]     Glaucoma, open angle [H40.10X0]     Major depressive disorder, recurrent episode, mild ([facility]) [F33.0]     GERD (gastroesophageal reflux disease) [K21.9]     Hyperlipidemia [E78.5]          Plan  Post operative seroma vs abscess   General surgery admitted the patient   Abx per surgery at this time   Check inflammatory markers   Possible bed side drainage   CKD  Baseline is around 1.2-1.3  Monitor daily   Avoid nephrotoxins, I would recommend against Toradol at this time I have placed on hold   Stop IVFs   Type 2 DM   Goal is 140-180 while in patient   Titrate insulin as needed   Hypoglycemia protocol   COPD  Continue Pulmicort and Brovana nebs   Monitor     DVT prophylaxis per surgery  Code status full  Medications, labs and imaging reviewed   Discharge plan: Per surgery - ok for DC per medicine POV    Electronically signed by Joselyn Paniagua MD on 8/26/2022 at 9:29 AM    I can be reached through [address] [place] [address]. Addendum: I have personally participated in a face-to-face history and physical exam on the date of service with the patient. I have discussed the case with the resident. I also participated in medical decision making with the resident on the date of service and I agree with all of the pertinent clinical information unless indicated in my editing of the note. I have reviewed and edited the note above based on my findings during my history, exam, and decision making on the same day of service. The vitals, labs, imaging, medications and treatment plan were reviewed independently by myself in addition to with the resident doctor. I agree with the above documentation and treatment plan     Electronically signed by Taurus Bella MD on 8/26/2022 at 3:12 PM    I can be reached through [address] [place] [address].

## 2022-08-26 NOTE — DISCHARGE SUMMARY
Physician Discharge Summary     Patient ID:  Gloria Flanaganland  [id number]  89 y.o.  1955    Admit date: 8/23/2022    Discharge date and time: 8/26/2022 12:37 PM     Admitting Physician: No admitting provider for patient encounter. Admission Diagnoses: COPD exacerbation ([facility] [J44.1]  Postoperative abscess [T81.49XA]    Discharge Diagnoses: Principal Problem:    Postoperative abscess  Active Problems:    Coronary artery disease involving native coronary artery of native heart without angina pectoris    DAYAN and COPD overlap syndrome (HCC)    DM2 (diabetes mellitus, type 2) ([facility])    Cigarette smoker    Marijuana use, smoked    Hyperlipidemia    GERD (gastroesophageal reflux disease)    Major depressive disorder, recurrent episode, mild ([facility])    Glaucoma, open angle    Hiatal hernia    History of stroke    Acute on chronic diastolic (congestive) heart failure ([facility])    CKD (chronic kidney disease) stage 3, GFR 30-59 ml/min ([facility]    Hypertensive emergency  Resolved Problems:    * No resolved hospital problems. *      Admission Condition: fair    Discharged Condition: stable    Indication for Admission: Post-op pain    Hospital Course/Procedures/Operation/treatments:   624: 78year old female admitted with post-op abdominal pain after robotic assisted ventral hernia repair. Pain control PRN, NPO, IVFs, abx  8/25: Still with pain, performed bedside aspiration of hematoma cavity, continue abx, started diet. 8/26: Pain improved, stopped abx, DC home today.       Consults:   IP CONSULT TO IV TEAM  IP CONSULT TO GENERAL SURGERY  IP CONSULT TO PRIMARY CARE PROVIDER  IP CONSULT TO SOCIAL WORK    Significant Diagnostic Studies:   CT ABDOMEN PELVIS WO CONTRAST Additional Contrast? None    Result Date: 8/23/2022  EXAMINATION: CT OF THE ABDOMEN AND PELVIS WITHOUT CONTRAST 8/23/2022 5:05 pm TECHNIQUE: CT of the abdomen and pelvis was performed without the administration of intravenous contrast. Multiplanar reformatted images are provided for review. Automated exposure control, iterative reconstruction, and/or weight based adjustment of the mA/kV was utilized to reduce the radiation dose to as low as reasonably achievable. COMPARISON: None. HISTORY: ORDERING SYSTEM PROVIDED HISTORY: post op pain, recent ventral hernia repair TECHNOLOGIST PROVIDED HISTORY: Reason for exam:->post op pain, recent ventral hernia repair Additional Contrast?->None Decision Support Exception - unselect if not a suspected or confirmed emergency medical condition->Emergency Medical Condition (MA) FINDINGS: Lower Chest: Patchy areas of ground-glass appearance involving both lung bases. Organs: The liver, spleen, adrenal glands, right kidney, pancreas are normal. Gallbladder is absent. Left renal cortical atrophy. GI/Bowel: Colonic fecal retention. Normal small bowel. Pelvis: Normal urinary bladder. Peritoneum/Retroperitoneum: No free fluid or free air. Bones/Soft Tissues: 6.5 cm x 6.5 cm x 3.5 cm gas and fluid containing thick-walled collection involving the right paramidline subcutaneous fat along the ventral abdominal wall and consistent with abscess and adjacent fat containing hernia. Unremarkable osseous structures. 6.5 cm x 6.5 cm x 3.5 cm gas and fluid containing thick-walled collection in the ventral abdominal wall subcutaneous fat to the right of midline consistent with abscess and adjacent fat containing hernia. .     XR CHEST PORTABLE    Result Date: 8/23/2022  EXAMINATION: ONE XRAY VIEW OF THE CHEST 8/23/2022 3:42 pm COMPARISON: None. HISTORY: ORDERING SYSTEM PROVIDED HISTORY: sob TECHNOLOGIST PROVIDED HISTORY: Reason for exam:->sob FINDINGS: The heart is enlarged. Pulmonary vessels appear congested. Mild opacity at the lung bases. There is no pneumothorax. Minimal blunting of the costophrenic angles. Early CHF.        Discharge Exam:  GENERAL:  Laying in bed, awake, alert, cooperative, no apparent distress  HEAD: Normocephalic, atraumatic  EYES: No sclera icterus, pupils equal  LUNGS: On BiPAP  CARDIOVASCULAR:  Bradycardic and normotensive  ABDOMEN:  Soft, ecchymosis around umbilicus, moderate TTP over epigastric area, non-distended  EXTREMITIES: No edema or swelling  SKIN: Warm and dry    Disposition: home    In process/preliminary results:  Outstanding Order Results       No orders found from 7/25/2022 to 8/24/2022. Patient Instructions:   Discharge Medication List as of 8/26/2022 10:21 AM             Details   traMADol (ULTRAM) 50 MG tablet Take 2 tablets by mouth every 6 hours as needed for Pain for up to 3 days. , Disp-12 tablet, R-0Normal      methocarbamol (ROBAXIN) 500 MG tablet Take 2 tablets by mouth 4 times daily for 10 days, Disp-80 tablet, R-0Normal                Details   BREO ELLIPTA 100-25 MCG/INH AEPB inhaler Inhale 1 puff into the lungs daily, DAWHistorical Med      YUPELRI 175 MCG/3ML SOLN Inhale 3 mLs into the lungs daily, DAWHistorical Med      azelastine (ASTELIN) 0.1 % nasal spray 2 sprays by Nasal route in the morning and 2 sprays before bedtime. Use in each nostril as directed., Disp-120 mL, R-5Normal      isosorbide mononitrate (IMDUR) 60 MG extended release tablet Take 60 mg by mouth in the morning. Historical Med      fluticasone (FLONASE) 50 MCG/ACT nasal spray 2 sprays by Each Nostril route in the morning., Disp-16 g, R-5Normal      bumetanide (BUMEX) 1 MG tablet Take 1 tablet by mouth daily, Disp-90 tablet, R-3Normal      carvedilol (COREG) 25 MG tablet Take 1 tablet by mouth 2 times daily (with meals), Disp-180 tablet, R-3Normal      sacubitril-valsartan (ENTRESTO) 24-26 MG per tablet Take 1 tablet by mouth 2 times daily, Disp-180 tablet, R-3Normal      aspirin 81 MG chewable tablet Take 1 tablet by mouth daily, Disp-90 tablet, R-3Normal      hydrALAZINE (APRESOLINE) 50 MG tablet Take 1 tablet by mouth 3 times daily, Disp-270 tablet, R-3Normal      simvastatin (ZOCOR) 20 MG tablet Take 1 tablet by mouth nightly, Disp-90 tablet, R-3Normal      cetirizine (ZYRTEC) 10 MG tablet Take 1 tablet by mouth daily, Disp-30 tablet, R-5Normal      nitroGLYCERIN (NITROSTAT) 0.3 MG SL tablet Place 0.3 mg under the tongue every 5 minutes as needed for Chest pain up to max of 3 total doses. If no relief after 1 dose, call 911. Historical Med      acetaminophen (TYLENOL) 325 MG tablet Take 1,000 mg by mouth every 6 hours as needed for Pain Historical Med      DULoxetine (CYMBALTA) 60 MG extended release capsule Take 60 mg by mouth 2 times daily Patient only has supply to take once a dayHistorical Med      famotidine (PEPCID) 40 MG tablet Take 40 mg by mouth Daily with supperHistorical Med      gabapentin (NEURONTIN) 100 MG capsule Take 100 mg by mouth 2 times daily. Historical Med      Insulin Degludec (TRESIBA FLEXTOUCH) 200 UNIT/ML SOPN Inject 50 Units into the skin at bedtimeHistorical Med      insulin lispro, 1 Unit Dial, 100 UNIT/ML SOPN Inject 0-6 Units into the skin 3 times daily (before meals) *Per Sliding Scale*Historical Med      montelukast (SINGULAIR) 10 MG tablet Take 10 mg by mouth nightlyHistorical Med      mirabegron (MYRBETRIQ) 50 MG TB24 Take 50 mg by mouth at bedtimeHistorical Med      traZODone (DESYREL) 100 MG tablet Take 100 mg by mouth nightlyHistorical Med      vitamin D (CHOLECALCIFEROL) 25 MCG (1000 UT) TABS tablet Take 1,000 Units by mouth dailyHistorical Med      pantoprazole (PROTONIX) 40 MG tablet Take 40 mg by mouth daily Historical Med      docusate sodium (COLACE) 100 MG capsule Take 200 mg by mouth at bedtime Historical Med                            HEART FAILURE  / CONGESTIVE HEART FAILURE  DISCHARGE INSTRUCTIONS:  GUIDELINES TO FOLLOW AT HOME    Self- Managed Care:     MEDICATIONS:  Take your medication as directed. If you are experiencing any side effects, inform your doctor, Do not stop taking any of your medications without letting your doctor know.    Check with your doctor before taking any over-the-counter medications / herbal / or dietary supplements. They may interfere with your other medications. Do not take ibuprofen (Advil or Motrin) and naproxen (Aleve) without talking to your doctor first. They could make your heart failure worse. WEIGHT MONITORING:   Weigh yourself everyday (with the same scale) around the same time of the day and write it down. (you can chart them on a calendar or keep track of them on paper. Notify your doctor of a weight gain of 3 pounds or more in 1 day   OR a total of 5 pounds or more in 1 week    Take your weight record to your doctor visits  Also, the same goes if you loose more than 3# in one day, let your heart doctor know. DIET:   Cardiac heart healthy diet- Low saturated / low trans fat, no added salt, caffeine restricted, Low sodium diet-   No more than 2,000mg (2 grams) of salt / sodium per day (which equals to a little less than  a teaspoon of salt)  If your doctor wants you on a fluid restriction. ..it is usually recommended a fluid limit of 2,000cc -  Fluid restriction- 2,000 ml (milliliters) = 64 ounces = you can have 8 glasses of fluid per day (each glass 8 ounces)    Follow a low salt diet - avoid using salt at the table, avoid / limit use of canned soups, processed / packaged foods, salted snacks, olives and pickles. Do not use a salt substitute without checking with your doctor, they may contain a high amount of potassioum. (Mrs. Ade Peralta is safe to use).     Limit the use of alcohol       CALL YOUR DOCTOR THE FIRST DAY YOU NOTICE ANY OF THESE   SYMPTOMS:  You have a weight gain of 3 pounds or more in 1 day         OR 5 pounds or more in one week  More shortness of breath  More swelling of your stomach, legs, ankles or feet  Feeling more tired, No energy  Dry hacky cough  Dizziness  More chest pain / discomfort       (CALL 911 IF ANY OF THE FOLLOWING OCCURS  Chest pain (not relieved with nitroglycerine, if you have been prescribed this medication)  Severe shortness of breath  Faint / Pass out  Confusion / cannot think clearly  If symptoms get worse           SMOKING - TOBACCO USE:  * IF YOU SMOKE - STOP! Kick the habit. [address] Program is offered at [place] [id number] and [address] [facility]. Call [phone] extension [phone] for more information. ACTIVITY:   (Ask your doctor when you will be able to return to work and before starting any exercise program.  Do not drive unless unless your doctor has given you permission to do so). Start light exercise. Even if you can only do a small amount, exercise will help you get stronger, have more energy, help manage your weight and decrease  stress. Walking is an easy way to get exercise. Start out slowly and  increase the amount you walk as tolerated  If you become short of breath, dizzy or have chest pain; stop, sit down, and rest.  If you feel \"wiped out\" the day after you exercise, walk at a slower pace or for a shorter distance. You can gradually increase the pace or amount of time. (Do not exercise right after a meal or in extreme temperatures, such as above 85 degrees, if the air is really humid, or wind chill is less than 20 degrees)                                             ADDITIONAL INFORMATION:  Avoid getting sick from colds and the flu. Stay away from friends or family that you know may have a contagious illness  Get plenty of rest   Get a flu shot each year. Get a pneumococcal vaccine shot. If you have had one before, ask your doctor whether you need another dose. My Goal for Self-management of Heart Failure Includes 5 steps :    1. Notice a change in symptoms ( weight gain, short of breath, leg swelling, decreased activity level, bloating. ...)    2. Evaluate the change: (use the Heart Failure Zones )     3.  Decide to take action: decide what your options are, such as: (call your doctor for an extra visit, take a prescribed medication, such as your water pill if your doctor has given you directions to do so, [address])    4. Come up with a strategy:  (now you call the doctor for advice / appointment. This is where you take action!!! Do not wait, catch the symptom early and treat it before it worsens. 5. Evaluate the response: The next day, check your Heart Failure Zones: are you in the GREEN ZONE (safe zone)? Worsening symptoms of YELLOW ZONE? Or have you moved to the RED ZONE and need to call 911 or go to the Emergency Room for evaluation? Call your doctor's office to update them on your symptoms of heart failure. Post op Abdominal Surgery Instructions    1. May shower daily with Dial soap and water. Pat incisions dry. No lotions, creams or powders. No tub baths/hot tubs or pools. 2. No lifting [id number]; no pushing/pulling. No driving until after seen by surgeon in the office. May go up and down a flight of stairs 1-2X daily. Walk at least 3x daily. No sexual activity until after seen in the office. 3.  Okay for Diet    4. Take all medications as listed on your Discharge Instruction sheet    5. Call Your Doctor If Any of the Following Occurs     Signs of infection, including fever and chills   Redness, swelling, increasing pain, excessive bleeding, or discharge at the incision site   Cough, shortness of breath, chest pain   Increased abdominal pain   Nausea and/or vomiting that does not resolve off narcotics. Pain, burning, urgency or frequency of urination, or blood in the urine   Pain and/or swelling in your feet, calves, or legs   Dark urine, light stools, or evidence of jaundice (yellowing of the skin or eyes). In case of an emergency,    CALL 911  immediately. 6. Follow up with Dr. Katey Lopez in 2 weeks, please call his office upon discharge to schedule an appointment.        Follow up:   Beverley Barrios [address] [phone]    Schedule an appointment as soon as possible for a visit in 2 week(s)  For wound re-check    Viki Hatchet, [address] [place] [address] [phone]    Schedule an appointment as soon as possible for a visit  follow up, As needed       Signed:  Apoorva Santana MD  8/26/2022  4:05 PM

## 2022-08-26 NOTE — PROGRESS NOTES
I was messaged by nursing because the patient was still complaining of 9/10 pain after she was given the ordered ultram. Due to several allergies on chart, I have ordered small dose fentanyl for severe pain and continued the ultram for moderate pain. Dr. Jonas Rausch will see the patient this morning. Please call with any urgent matters.

## 2022-08-26 NOTE — PROGRESS NOTES
GENERAL SURGERY  DAILY PROGRESS NOTE  8/26/2022    Chief Complaint   Patient presents with    Shortness of Breath     Had hernia repair surgery on 8/11, since then she has had increase in shortness of breath, and states the pain is in her lower abdominal area, she states that she is moving her bowels without difficulty and has been having an increase in urination, a lot of pressure in her lower abdominal area        Subjective:  Pt states she is feeling a little better. Reports flatus and BM.     Objective:  BP (!) 154/84   Pulse 57   Temp 97.7 °F (36.5 °C) (Axillary)   Resp 18   Ht 5' 1\" (1.549 m)   Wt 219 lb 11.2 oz (99.7 kg)   LMP 01/01/1990   SpO2 100%   BMI 41.51 kg/m²     GENERAL:  Laying in bed, awake, alert, cooperative, no apparent distress  HEAD: Normocephalic, atraumatic  EYES: No sclera icterus, pupils equal  LUNGS: On BiPAP  CARDIOVASCULAR:  Bradycardic and normotensive  ABDOMEN:  Soft, ecchymosis around umbilicus, moderate TTP over epigastric area, non-distended  EXTREMITIES: No edema or swelling  SKIN: Warm and dry    Assessment/Plan:  78 y.o. female with post-op fluid collection s/p bedside aspiration 8/25    - Okay for regular diet as tolerated  - Pain control PRN  - Stop abx  - DC home today    Electronically signed by Michelle Copeland MD on 8/26/2022 at 7:19 AM

## 2022-08-26 NOTE — PROGRESS NOTES
Physician Progress Note      Dewayne Marcum  [facility] #:                  [id number]  :                       1955  ADMIT DATE:       2022 3:33 PM  [address] [place] DATE:  RESPONDING  PROVIDER #:        Anita Adamson MD          QUERY TEXT:    Patient admitted with post op hematoma. Per Op note dated  documentation   of I&D. To accurately reflect the procedure performed please further specify the depth   of tissue incised and drained: The medical record reflects the following:  Risk Factors: post op hematoma  Clinical Indicators: per  Op note \". Dariusz Loera The patient was prepped and draped   in the usual fashion. Using a 10 cc syringe and a 21-gauge needle, the cavity   of interest was accessed and aspirated. Withdrew 12 cc of sterile hematoma. The patient tolerated the procedure well. Dariusz Loera \"  Treatment: bedside drainage    Thank you,  Ezequiel SANDY, RN, CDIS  Clinical Documentation Improvement  [email or web address]  Options provided:  -- Skin only  -- Subcutaneous tissue  -- Fascia  -- Muscle  -- Other - I will add my own diagnosis  -- Disagree - Not applicable / Not valid  -- Disagree - Clinically unable to determine / Unknown  -- Refer to Clinical Documentation Reviewer    PROVIDER RESPONSE TEXT:    Addendum to  procedure note The depth of the drainage to abdomen was down   to and including subcutaneous tissue.     Query created by: Dayan Mora on 2022 7:29 AM      Electronically signed by:  Anita Adamson MD 2022 8:48 AM

## 2022-08-30 ENCOUNTER — HOSPITAL ENCOUNTER (OUTPATIENT)
Dept: OTHER | Age: 67
Setting detail: THERAPIES SERIES
Discharge: HOME | End: 2022-08-30
Payer: MEDICARE

## 2022-09-12 ENCOUNTER — HOSPITAL ENCOUNTER (OUTPATIENT)
Dept: SPEECH THERAPY | Age: 67
Setting detail: THERAPIES SERIES
Discharge: HOME | End: 2022-09-12

## 2022-09-12 NOTE — PROGRESS NOTES
Pt did not show for scheduled voice evaluation.     Mau Marie M.A., [id number] [facility]  Speech Pathologist  9/12/2022

## 2022-09-22 ENCOUNTER — HOSPITAL ENCOUNTER (OUTPATIENT)
Dept: OTHER | Age: 67
Setting detail: THERAPIES SERIES
Discharge: HOME | End: 2022-09-22
Payer: MEDICARE

## 2022-09-22 VITALS
DIASTOLIC BLOOD PRESSURE: 82 MMHG | SYSTOLIC BLOOD PRESSURE: 150 MMHG | HEART RATE: 80 BPM | WEIGHT: 207.38 LBS | BODY MASS INDEX: 39.18 KG/M2 | RESPIRATION RATE: 17 BRPM | OXYGEN SATURATION: 92 %

## 2022-09-22 LAB
ANION GAP SERPL CALCULATED.3IONS-SCNC: 8 MMOL/L (ref 7–16)
BUN BLDV-MCNC: 25 MG/DL (ref 6–23)
CALCIUM SERPL-MCNC: 9 MG/DL (ref 8.6–10.2)
CHLORIDE BLD-SCNC: 101 MMOL/L (ref 98–107)
CO2: 28 MMOL/L (ref 22–29)
CREAT SERPL-MCNC: 1.4 MG/DL (ref 0.5–1)
GFR AFRICAN AMERICAN: 45
GFR NON-AFRICAN AMERICAN: 45 ML/MIN/1.73
GLUCOSE BLD-MCNC: 160 MG/DL (ref 74–99)
POTASSIUM SERPL-SCNC: 4.7 MMOL/L (ref 3.5–5)
PRO-BNP: 575 PG/ML (ref 0–125)
SODIUM BLD-SCNC: 137 MMOL/L (ref 132–146)

## 2022-09-22 PROCEDURE — 36415 COLL VENOUS BLD VENIPUNCTURE: CPT

## 2022-09-22 PROCEDURE — 99214 OFFICE O/P EST MOD 30 MIN: CPT

## 2022-09-22 PROCEDURE — 83880 ASSAY OF NATRIURETIC PEPTIDE: CPT

## 2022-09-22 PROCEDURE — 80048 BASIC METABOLIC PNL TOTAL CA: CPT

## 2022-09-22 RX ORDER — CYCLOBENZAPRINE HCL 5 MG
TABLET ORAL
COMMUNITY
Start: 2022-07-14

## 2022-09-22 RX ORDER — OXYBUTYNIN CHLORIDE 10 MG/1
10 TABLET, EXTENDED RELEASE ORAL DAILY
COMMUNITY

## 2022-09-22 NOTE — PROGRESS NOTES
Provider, MD   insulin lispro, 1 Unit Dial, 100 UNIT/ML SOPN Inject 0-6 Units into the skin 3 times daily (before meals) *Per Sliding Scale*  Historical Provider, MD   montelukast (SINGULAIR) 10 MG tablet Take 10 mg by mouth nightly  Historical Provider, MD   traZODone (DESYREL) 100 MG tablet Take 100 mg by mouth nightly  Historical Provider, MD   vitamin D (CHOLECALCIFEROL) 25 MCG (1000 UT) TABS tablet Take 1,000 Units by mouth daily  Historical Provider, MD   docusate sodium (COLACE) 100 MG capsule Take 200 mg by mouth at bedtime   Historical Provider, MD             Physical Examination:     BP (!) 150/82   Pulse 80   Resp 17   Wt 207 lb 6 oz (94.1 kg)   LMP 01/01/1990   SpO2 92%   BMI 39.18 kg/m²   Assessment  Charting Type: Shift assessment                   Respiratory  Respiratory Pattern: Regular  L Breath Sounds: Clear, Diminished  R Breath Sounds: Clear, Diminished              Cardiac  Heart Sounds: S1, S2  Cardiac Rhythm: Sinus rhythm    Rhythm Interpretation  Heart Rate: 80         Gastrointestinal  Abdominal (WDL): Within Defined Limits  Abdomen Inspection: Rounded, Soft  RUQ Bowel Sounds: Active  LUQ Bowel Sounds: Active  RLQ Bowel Sounds: Active  LLQ Bowel Sounds: Active          Bowel Sounds  RUQ Bowel Sounds: Active  LUQ Bowel Sounds: Active  RLQ Bowel Sounds: Active  LLQ Bowel Sounds:  Active    Peripheral Vascular  Peripheral Vascular (WDL): Within Defined Limits  Edema: None                        Psychosocial  Psychosocial (WDL): Within Defined Limits                        Heart Rate: 80                     LAB DATA:    Last 3 BMP      Sodium (mmol/L)   Date Value   09/22/2022 137   08/26/2022 136   08/25/2022 138     Potassium (mmol/L)   Date Value   09/22/2022 4.7   08/24/2022 5.2 (H)     Potassium reflex Magnesium (mmol/L)   Date Value   08/26/2022 4.7   08/25/2022 4.8   08/24/2022 5.2 (H)     Chloride (mmol/L)   Date Value   09/22/2022 101   08/26/2022 99   08/25/2022 102     CO2 (mmol/L)   Date Value   09/22/2022 28   08/26/2022 29   08/25/2022 26     BUN (mg/dL)   Date Value   09/22/2022 25 (H)   08/26/2022 35 (H)   08/25/2022 33 (H)     Glucose (mg/dL)   Date Value   09/22/2022 160 (H)   08/26/2022 122 (H)   08/25/2022 114 (H)   12/01/2011 181 (H)   10/05/2011 133 (H)   08/21/2011 94     Calcium (mg/dL)   Date Value   09/22/2022 9.0   08/26/2022 8.8   08/25/2022 8.7       Last 3 BNP       Pro-BNP (pg/mL)   Date Value   09/22/2022 575 (H)   08/23/2022 611 (H)   08/09/2022 586 (H)          CBC: No results for input(s): WBC, HGB, PLT in the last 72 hours. BMP:    Recent Labs     09/22/22  1307      K 4.7      CO2 28   BUN 25*   CREATININE 1.4*   GLUCOSE 160*       Hepatic: No results for input(s): AST, ALT, ALB, BILITOT, ALKPHOS in the last 72 hours. Troponin: No results for input(s): TROPONINI in the last 72 hours. BNP: No results for input(s): BNP in the last 72 hours. Lipids: No results for input(s): CHOL, HDL in the last 72 hours. Invalid input(s): LDLCALCU  INR: No results for input(s): INR in the last 72 hours. WEIGHTS:    Wt Readings from Last 3 Encounters:   09/22/22 207 lb 6 oz (94.1 kg)   08/26/22 219 lb 11.2 oz (99.7 kg)   08/09/22 208 lb (94.3 kg)         TELEMETRY:  Cardiac Regularity: Regular  Cardiac Rhythm/Interpretation: NSR        ASSESSMENT:  Adrian Rhoades is euvolemic with stable weights. She reports feeling well over all but she does have minimal SOB on exertion.      Interventions completed this visit:  IV diuretics given -no  Lab work obtained yes, BMP/BNP per IV team  Reviewed currently prescribed medications with patient, educated on importance of compliance and answered any questions regarding their medication  Educated on signs and symptoms of HF  Educated on low sodium diet    PLAN:  Scheduled to follow up in CHF clinic on   Future Appointments   Date Time Provider Melanie Sellers   10/6/2022  1:15 PM [facility] ROOM 1 [facility] [place] HOD   10/12/2022 11:20 AM Shubham Otto MD [facility]   12/22/2022 12:00 PM DAVID [facility] ROOM 2 DAVID [facility] [place] HOD   1/31/2023 10:30 AM DO JUSTA Bazzi [facility]   7/6/2023 10:30 AM Cara Solomon MD [facility]     Given clinic phone number [phone] and aware of signs and symptoms to call with any HF change in symptoms.

## 2022-09-22 NOTE — PLAN OF CARE
Problem: Chronic Conditions and Co-morbidities  Goal: Patient's chronic conditions and co-morbidity symptoms are monitored and maintained or improved  Outcome: Progressing   CHF continue plan of care

## 2022-10-06 ENCOUNTER — HOSPITAL ENCOUNTER (OUTPATIENT)
Dept: OTHER | Age: 67
Discharge: HOME | End: 2022-10-06

## 2022-10-12 ENCOUNTER — APPOINTMENT (OUTPATIENT)
Dept: GENERAL RADIOLOGY | Age: 67
End: 2022-10-12
Payer: MEDICARE

## 2022-10-12 ENCOUNTER — APPOINTMENT (OUTPATIENT)
Dept: CT IMAGING | Age: 67
End: 2022-10-12
Payer: MEDICARE

## 2022-10-12 ENCOUNTER — HOSPITAL ENCOUNTER (EMERGENCY)
Age: 67
Discharge: HOME | End: 2022-10-12
Attending: EMERGENCY MEDICINE
Payer: MEDICARE

## 2022-10-12 VITALS
WEIGHT: 204 LBS | TEMPERATURE: 98.4 F | DIASTOLIC BLOOD PRESSURE: 81 MMHG | BODY MASS INDEX: 38.51 KG/M2 | RESPIRATION RATE: 18 BRPM | HEART RATE: 70 BPM | OXYGEN SATURATION: 94 % | HEIGHT: 61 IN | SYSTOLIC BLOOD PRESSURE: 167 MMHG

## 2022-10-12 DIAGNOSIS — R55 SYNCOPE AND COLLAPSE: Primary | ICD-10-CM

## 2022-10-12 DIAGNOSIS — K57.90 DIVERTICULOSIS: ICD-10-CM

## 2022-10-12 DIAGNOSIS — N30.00 ACUTE CYSTITIS WITHOUT HEMATURIA: ICD-10-CM

## 2022-10-12 DIAGNOSIS — L02.211 ABDOMINAL WALL ABSCESS: ICD-10-CM

## 2022-10-12 DIAGNOSIS — K76.9 HEPATIC LESION: ICD-10-CM

## 2022-10-12 LAB
ANION GAP SERPL CALCULATED.3IONS-SCNC: 7 MMOL/L (ref 7–16)
BACTERIA: ABNORMAL /HPF
BASOPHILS ABSOLUTE: 0.03 E9/L (ref 0–0.2)
BASOPHILS RELATIVE PERCENT: 0.4 % (ref 0–2)
BILIRUBIN URINE: NEGATIVE
BLOOD, URINE: NEGATIVE
BUN BLDV-MCNC: 22 MG/DL (ref 6–23)
CALCIUM SERPL-MCNC: 9.2 MG/DL (ref 8.6–10.2)
CHLORIDE BLD-SCNC: 101 MMOL/L (ref 98–107)
CLARITY: CLEAR
CO2: 27 MMOL/L (ref 22–29)
COLOR: YELLOW
CREAT SERPL-MCNC: 1.3 MG/DL (ref 0.5–1)
EKG ATRIAL RATE: 62 BPM
EKG P AXIS: 60 DEGREES
EKG P-R INTERVAL: 200 MS
EKG Q-T INTERVAL: 498 MS
EKG QRS DURATION: 142 MS
EKG QTC CALCULATION (BAZETT): 505 MS
EKG R AXIS: 50 DEGREES
EKG VENTRICULAR RATE: 62 BPM
EOSINOPHILS ABSOLUTE: 0.15 E9/L (ref 0.05–0.5)
EOSINOPHILS RELATIVE PERCENT: 2.2 % (ref 0–6)
EPITHELIAL CELLS, UA: ABNORMAL /HPF
GFR AFRICAN AMERICAN: 49
GFR NON-AFRICAN AMERICAN: 49 ML/MIN/1.73
GLUCOSE BLD-MCNC: 103 MG/DL (ref 74–99)
GLUCOSE URINE: NEGATIVE MG/DL
HCT VFR BLD CALC: 39.5 % (ref 34–48)
HEMOGLOBIN: 12.7 G/DL (ref 11.5–15.5)
IMMATURE GRANULOCYTES #: 0.02 E9/L
IMMATURE GRANULOCYTES %: 0.3 % (ref 0–5)
KETONES, URINE: NEGATIVE MG/DL
LEUKOCYTE ESTERASE, URINE: ABNORMAL
LYMPHOCYTES ABSOLUTE: 1.29 E9/L (ref 1.5–4)
LYMPHOCYTES RELATIVE PERCENT: 18.7 % (ref 20–42)
MCH RBC QN AUTO: 31.4 PG (ref 26–35)
MCHC RBC AUTO-ENTMCNC: 32.2 % (ref 32–34.5)
MCV RBC AUTO: 97.8 FL (ref 80–99.9)
MONOCYTES ABSOLUTE: 0.35 E9/L (ref 0.1–0.95)
MONOCYTES RELATIVE PERCENT: 5.1 % (ref 2–12)
NEUTROPHILS ABSOLUTE: 5.06 E9/L (ref 1.8–7.3)
NEUTROPHILS RELATIVE PERCENT: 73.3 % (ref 43–80)
NITRITE, URINE: POSITIVE
PDW BLD-RTO: 12.6 FL (ref 11.5–15)
PH UA: 6 (ref 5–9)
PLATELET # BLD: 260 E9/L (ref 130–450)
PMV BLD AUTO: 10.2 FL (ref 7–12)
POTASSIUM REFLEX MAGNESIUM: 4.8 MMOL/L (ref 3.5–5)
PROTEIN UA: NEGATIVE MG/DL
RBC # BLD: 4.04 E12/L (ref 3.5–5.5)
RBC UA: ABNORMAL /HPF (ref 0–2)
SODIUM BLD-SCNC: 135 MMOL/L (ref 132–146)
SPECIFIC GRAVITY UA: 1.01 (ref 1–1.03)
TROPONIN, HIGH SENSITIVITY: 13 NG/L (ref 0–9)
TROPONIN, HIGH SENSITIVITY: 13 NG/L (ref 0–9)
UROBILINOGEN, URINE: 0.2 E.U./DL
WBC # BLD: 6.9 E9/L (ref 4.5–11.5)
WBC UA: ABNORMAL /HPF (ref 0–5)

## 2022-10-12 PROCEDURE — 70450 CT HEAD/BRAIN W/O DYE: CPT

## 2022-10-12 PROCEDURE — 99285 EMERGENCY DEPT VISIT HI MDM: CPT

## 2022-10-12 PROCEDURE — 85025 COMPLETE CBC W/AUTO DIFF WBC: CPT

## 2022-10-12 PROCEDURE — 6370000000 HC RX 637 (ALT 250 FOR IP)

## 2022-10-12 PROCEDURE — 73030 X-RAY EXAM OF SHOULDER: CPT

## 2022-10-12 PROCEDURE — 73560 X-RAY EXAM OF KNEE 1 OR 2: CPT

## 2022-10-12 PROCEDURE — 84484 ASSAY OF TROPONIN QUANT: CPT

## 2022-10-12 PROCEDURE — 74176 CT ABD & PELVIS W/O CONTRAST: CPT

## 2022-10-12 PROCEDURE — 81001 URINALYSIS AUTO W/SCOPE: CPT

## 2022-10-12 PROCEDURE — 71250 CT THORAX DX C-: CPT

## 2022-10-12 PROCEDURE — 72125 CT NECK SPINE W/O DYE: CPT

## 2022-10-12 PROCEDURE — 93005 ELECTROCARDIOGRAM TRACING: CPT

## 2022-10-12 PROCEDURE — 80048 BASIC METABOLIC PNL TOTAL CA: CPT

## 2022-10-12 RX ORDER — ACETAMINOPHEN 500 MG
1000 TABLET ORAL ONCE
Status: DISCONTINUED | OUTPATIENT
Start: 2022-10-12 | End: 2022-10-12

## 2022-10-12 RX ORDER — OXYCODONE HYDROCHLORIDE AND ACETAMINOPHEN 5; 325 MG/1; MG/1
1 TABLET ORAL EVERY 8 HOURS PRN
Qty: 9 TABLET | Refills: 0 | Status: SHIPPED | OUTPATIENT
Start: 2022-10-12 | End: 2022-10-15

## 2022-10-12 RX ORDER — OXYCODONE HYDROCHLORIDE AND ACETAMINOPHEN 5; 325 MG/1; MG/1
1 TABLET ORAL ONCE
Status: COMPLETED | OUTPATIENT
Start: 2022-10-12 | End: 2022-10-12

## 2022-10-12 RX ORDER — CEFDINIR 300 MG/1
300 CAPSULE ORAL ONCE
Status: COMPLETED | OUTPATIENT
Start: 2022-10-12 | End: 2022-10-12

## 2022-10-12 RX ORDER — CEFDINIR 300 MG/1
300 CAPSULE ORAL 2 TIMES DAILY
Qty: 14 CAPSULE | Refills: 0 | Status: SHIPPED | OUTPATIENT
Start: 2022-10-12 | End: 2022-10-19

## 2022-10-12 RX ADMIN — CEFDINIR 300 MG: 300 CAPSULE ORAL at 16:08

## 2022-10-12 RX ADMIN — OXYCODONE AND ACETAMINOPHEN 1 TABLET: 5; 325 TABLET ORAL at 16:06

## 2022-10-12 ASSESSMENT — ENCOUNTER SYMPTOMS
VOMITING: 0
DIARRHEA: 0
SHORTNESS OF BREATH: 0
ABDOMINAL PAIN: 0
SINUS PRESSURE: 0
NAUSEA: 0
CONSTIPATION: 0
BLOOD IN STOOL: 0
CHEST TIGHTNESS: 0
COUGH: 0

## 2022-10-12 ASSESSMENT — PAIN SCALES - GENERAL: PAINLEVEL_OUTOF10: 10

## 2022-10-12 ASSESSMENT — PAIN DESCRIPTION - ORIENTATION: ORIENTATION: RIGHT;LEFT

## 2022-10-12 ASSESSMENT — PAIN DESCRIPTION - LOCATION: LOCATION: KNEE

## 2022-10-12 NOTE — ED PROVIDER NOTES
HPI   58-year-old female with past medical history for coronary artery disease, COPD, diabetes mellitus presents to the emergency department today with chief complaint of syncopal episode today with generalized body pain and abdominal pain. She states that this fall occurred this morning while she was getting up from bed to go to the bathroom. She states that she walks several steps and then out of nowhere found herself on the floor. She states that she does not recall being lightheaded or dizzy however when she tried to get herself up had a second fall and has had worsening pain on her left side. Patient otherwise states that she has not had syncopal episodes in the past and that she has had worsening abdominal pain since the fall as well. In August she mentions that she did have a hernia repair with Dr. Nirmala Cobb however has been doing well otherwise. Review of Systems   Constitutional:  Negative for chills, fatigue and fever. HENT:  Negative for congestion, sinus pressure and tinnitus. Respiratory:  Negative for cough, chest tightness and shortness of breath. Cardiovascular:  Negative for chest pain and leg swelling. Gastrointestinal:  Negative for abdominal pain, blood in stool, constipation, diarrhea, nausea and vomiting. Endocrine: Negative for polydipsia and polyphagia. Genitourinary:  Negative for difficulty urinating and frequency. Skin:  Negative for rash and wound. Neurological:  Negative for weakness, light-headedness, numbness and headaches. Psychiatric/Behavioral:  Negative for agitation and confusion. Physical Exam  Vitals and nursing note reviewed. Constitutional:       General: She is not in acute distress. Appearance: Normal appearance. She is not ill-appearing, toxic-appearing or diaphoretic. HENT:      Head: Normocephalic and atraumatic. Eyes:      Pupils: Pupils are equal, round, and reactive to light.    Cardiovascular:      Rate and Rhythm: Normal rate and regular rhythm. Heart sounds: Normal heart sounds. No murmur heard. No friction rub. No gallop. Pulmonary:      Effort: No respiratory distress. Breath sounds: Normal breath sounds. No stridor. No wheezing, rhonchi or rales. Abdominal:      General: Bowel sounds are normal. There is no distension. Palpations: Abdomen is soft. Tenderness: There is generalized abdominal tenderness. There is no right CVA tenderness, left CVA tenderness, guarding or rebound. Negative signs include Wang's sign, Rovsing's sign and McBurney's sign. Musculoskeletal:         General: Tenderness (diffusely, worst on left side, upper arm, chest, legs) present. No swelling, deformity or signs of injury. Cervical back: No rigidity or tenderness. Right lower leg: No edema. Left lower leg: No edema. Comments: Decreased left sided range of motion secondary to pain   Skin:     General: Skin is warm and dry. Capillary Refill: Capillary refill takes less than 2 seconds. Neurological:      General: No focal deficit present. Mental Status: She is alert and oriented to person, place, and time. Psychiatric:         Mood and Affect: Mood normal.         Behavior: Behavior normal.        Procedures     MDM  50-year-old female presenting to the emergency department today with chief complaint of fall and abdominal pain. Laboratory studies for CBC, BMP, EKG with troponin, urinalysis in addition to CT head and neck without contrast as well as CT abdomen pelvis and CT chest without contrast were ordered. Imaging of bilateral knees via x-ray was ordered. Patient to be given 1000 milligrams of Tylenol for pain as it appears that she has not had adverse effects of this medication in the past and has tolerated it well. Upon reevaluation patient states that she did not take the Tylenol because she has taken this in the past and it does not help with her pains.   At this time patient is given a 5-325 Percocet because she states that she has tolerated this in the past and does not have issues with the medication. Patient's CBC and BMP returned within normal limits and initial troponin was found to be 13 with a repeat of 13. Patient's urinalysis demonstrates many bacteria with small leukocyte esterase and positive nitrates. Shoulder x-ray demonstrates mild to moderate osteoarthritic changes without fracture or subluxation. She has CT studies demonstrate no acute intracranial findings, no acute fracture or dislocation of the cervical spine, several moderately enlarged mediastinal lymph nodes measuring up to 1.3 cm in the paratracheal region, and mild cardiomegaly as well as multiple areas of diffuse groundglass densities bilaterally likely due to CHF edema, fatty infiltrates of the liver with an 8 mm hypodense lesion of the left hepatic lobe without change, a 1.1 cm left adrenal nodule, and a 4.6 x 4.7 cm lower anterior abdominal wall fluid collection with improvement from prior. Diverticulosis also noted. Patient informed of all these findings and all questions were answered. Patient given a dose of Omnicef in the emergency department at this time. Otherwise patient states that she wishes to be discharged home and with patient's laboratory findings and clinically well appearance we are amenable with this. Patient given 1 week of Omnicef for treatment of urinary tract infection and is given several days of Percocet for pain after fall. She is instructed to follow-up with her family physician and to return to this emergency department should her symptoms persist or worsen. Patient has remained hemodynamically stable and is discharged home. See ED course for additional MDM. ED Course as of 10/12/22 2221   Wed Oct 12, 2022   1318 Patient evaluated at bedside at this time and is hemodynamically stable. [RW]   1413 EKG: Normal sinus rate and rhythm at about 60 bpm with normal axis and QTC of 505. Probable right bundle branch block noted. Compared to previous EKG this is consistent with no signs of ST elevation. EKG interpreted by myself. [RW]      ED Course User Index  [RW] Chadwick Bunch DO     --------------------------------------------- PAST HISTORY ---------------------------------------------  Past Medical History:  has a past medical history of Asthma, Atherosclerosis of native artery of left leg with rest pain ([address] CAD (coronary artery disease), Cellulitis and abscess of trunk, Cerebellar infarct (HCC), Chronic back pain, Chronic kidney disease, Chronic systolic congestive heart failure ([address] Chronic venous insufficiency, COPD (chronic obstructive pulmonary disease) ([address] COVID-19, Depression, Diabetes mellitus ([address] Diabetic neuropathy ([address] Diverticulosis, Fatty liver, Glaucoma, open angle, Hepatic encephalopathy ([address] Hiatal hernia, History of blood transfusion, Hyperlipidemia, Hyperplastic colon polyp, Hypertension, Incontinence, Liver mass, Morbid obesity ([address] Movement disorder, Myocardial infarction ([address] O2 dependent, Osteoarthritis, generalized, Pain in both lower legs, Peripheral vascular disease ([address] Pneumonia, Pulmonary edema, PVD (peripheral vascular disease) with claudication ([address] Sleep apnea, Status post peripheral artery angioplasty, Tobacco abuse, Tobacco abuse, Tobacco dependence, Tubular adenoma polyp of rectum, Urinary tract infection due to ESBL Klebsiella, and Ventral hernia. Past Surgical History:  has a past surgical history that includes knee surgery; Upper gastrointestinal endoscopy (12/20/12); Coronary artery bypass graft; Layered wound closure (Left, [id number]); Echo Complete (10/9/2013); polysomnography (1/2016); liver biopsy (1/8/2016); bronchial brush biopsy (1/25/2013); Breast surgery (2000); Cholecystectomy (YRS AGO); Cardiac surgery (12/2011); Cardiac catheterization (04/20/2015); Hysterectomy; joint replacement (2011);  Upper gastrointestinal endoscopy (12/23/2016); Colonoscopy (11/15/2013); Colonoscopy (12/23/2016); Upper gastrointestinal endoscopy (02/08/2017); Endoscopy, colon, diagnostic (04/18/2017); Gallbladder surgery; angioplasty (11/13/2018); and laryngoscopy (N/A, 4/4/2022). Social History:  reports that she has been smoking cigarettes. She started smoking about 48 years ago. She has a 10.00 pack-year smoking history. She has never used smokeless tobacco. She reports that she does not currently use alcohol. She reports current drug use. Drug: Marijuana Jovany Medrano). Family History: family history includes Asthma in her father; Cancer in her mother. The patients home medications have been reviewed.     Allergies: Latex, Bee venom, Dilaudid [hydromorphone hcl], Dye [iodides], Keflex [cephalexin], Lasix [furosemide], Levaquin [levofloxacin in d5w], Lipitor, Lyrica [pregabalin], Morphine, Naproxen, Nefazodone, Norvasc [amlodipine], Oxycodone-acetaminophen, Shellfish-derived products, and Trazodone and nefazodone    -------------------------------------------------- RESULTS -------------------------------------------------  Labs:  Results for orders placed or performed during the hospital encounter of 10/12/22   CBC with Auto Differential   Result Value Ref Range    WBC 6.9 4.5 - 11.5 E9/L    RBC 4.04 3.50 - 5.50 E12/L    Hemoglobin 12.7 11.5 - 15.5 g/dL    Hematocrit 39.5 34.0 - 48.0 %    MCV 97.8 80.0 - 99.9 fL    MCH 31.4 26.0 - 35.0 pg    MCHC 32.2 32.0 - 34.5 %    RDW 12.6 11.5 - 15.0 fL    Platelets 494 077 - 144 E9/L    MPV 10.2 7.0 - 12.0 fL    Neutrophils % 73.3 43.0 - 80.0 %    Immature Granulocytes % 0.3 0.0 - 5.0 %    Lymphocytes % 18.7 (L) 20.0 - 42.0 %    Monocytes % 5.1 2.0 - 12.0 %    Eosinophils % 2.2 0.0 - 6.0 %    Basophils % 0.4 0.0 - 2.0 %    Neutrophils Absolute 5.06 1.80 - 7.30 E9/L    Immature Granulocytes # 0.02 E9/L    Lymphocytes Absolute 1.29 (L) 1.50 - 4.00 E9/L    Monocytes Absolute 0.35 0.10 - 0.95 E9/L Eosinophils Absolute 0.15 0.05 - 0.50 E9/L    Basophils Absolute 0.03 0.00 - 0.20 C0/A   Basic Metabolic Panel w/ Reflex to MG   Result Value Ref Range    Sodium 135 132 - 146 mmol/L    Potassium reflex Magnesium 4.8 3.5 - 5.0 mmol/L    Chloride 101 98 - 107 mmol/L    CO2 27 22 - 29 mmol/L    Anion Gap 7 7 - 16 mmol/L    Glucose 103 (H) 74 - 99 mg/dL    BUN 22 6 - 23 mg/dL    Creatinine 1.3 (H) 0.5 - 1.0 mg/dL    GFR Non-African American 49 >=60 mL/min/1.73    GFR African American 49     Calcium 9.2 8.6 - 10.2 mg/dL   Troponin   Result Value Ref Range    Troponin, High Sensitivity 13 (H) 0 - 9 ng/L   Urinalysis with Microscopic   Result Value Ref Range    Color, UA Yellow Straw/Yellow    Clarity, UA Clear Clear    Glucose, Ur Negative Negative mg/dL    Bilirubin Urine Negative Negative    Ketones, Urine Negative Negative mg/dL    Specific Gravity, UA 1.015 1.005 - 1.030    Blood, Urine Negative Negative    pH, UA 6.0 5.0 - 9.0    Protein, UA Negative Negative mg/dL    Urobilinogen, Urine 0.2 <2.0 E.U./dL    Nitrite, Urine POSITIVE (A) Negative    Leukocyte Esterase, Urine SMALL (A) Negative    WBC, UA 2-5 0 - 5 /HPF    RBC, UA 0-1 0 - 2 /HPF    Epithelial Cells, UA FEW /HPF    Bacteria, UA MANY (A) None Seen /HPF   Troponin   Result Value Ref Range    Troponin, High Sensitivity 13 (H) 0 - 9 ng/L   EKG 12 Lead   Result Value Ref Range    Ventricular Rate 62 BPM    Atrial Rate 62 BPM    P-R Interval 200 ms    QRS Duration 142 ms    Q-T Interval 498 ms    QTc Calculation (Bazett) 505 ms    P Axis 60 degrees    R Axis 50 degrees       Radiology:  XR SHOULDER LEFT (MIN 2 VIEWS)   Final Result   Mild-to-moderate osteoarthritic changes of the left hip without of fracture   or subluxation         CT Head WO Contrast   Final Result   No acute findings. There is stable atrophy with small vessel ischemic   disease and old cortical infarct in the right cerebellum. CT cervical spine.       There is no acute fracture or dislocation in the cervical spine. The   prevertebral soft tissues are normal.  There is degenerative changes from   C3-T1 with osteophytes, multilevel disc bulges and calcification of posterior   longitudinal ligament. There is calcification of carotid arteries. Impression      No acute fractures. Diffuse degenerative changes from C3-T1 with calcification of posterior   longitudinal ligament. CT chest.      Comparison 06/09/2021      Findings      There is cardiomegaly with diffuse coronary artery calcification. Moderately   enlarged mediastinal lymph nodes are noted, lymph nodes measuring up to 1.3   cm in the paratracheal region. The  trachea and major bronchi are patent. There is persistent but improving atelectasis and ground-glass densities in   the upper and lower lobes bilaterally. There is no pleural effusion. Degenerative changes are identified in the thoracic spine. Impression      Cardiomegaly with coronary artery calcification. Atelectasis and  multiple areas of diffuse ground-glass densities bilaterally   which may be due to CHF/edema and or pneumonia. There is no other traumatic   injury in the chest.      CT abdomen and pelvis. Comparison August 23, 2022      Findings      There is decreased and heterogeneous attenuation of liver likely fatty   infiltration with the 8 mm hypodense lesion in the left hepatic lobe without   change. The gallbladder is absent. Spleen, pancreas, the adrenals and the   kidneys are normal except for a 1.1 cm left adrenal nodule. Nonspecific   lymph nodes are identified in the portacaval region. There is severe   calcification aorta and iliac arteries with common iliac arteries measuring   1.1 cm. Degenerative changes are identified in the lumbar spine. A fluid   collection is identified in the lower anterior abdominal wall which currently   measures 4.6 x 4.7 cm with improvement. Pelvis.   The bladder is normal.  There is diverticulosis of colon without CT   evidence for diverticulitis. Appendix is upper normal without inflammation. Impression      There is no acute traumatic injury or acute inflammation in the abdomen   pelvis. Improving but persistent fluid collection in the subcutaneous tissue in the   lower anterior abdominal wall likely improving abscess. Scattered diverticulosis of colon. CT Cervical Spine WO Contrast   Final Result   No acute findings. There is stable atrophy with small vessel ischemic   disease and old cortical infarct in the right cerebellum. CT cervical spine. There is no acute fracture or dislocation in the cervical spine. The   prevertebral soft tissues are normal.  There is degenerative changes from   C3-T1 with osteophytes, multilevel disc bulges and calcification of posterior   longitudinal ligament. There is calcification of carotid arteries. Impression      No acute fractures. Diffuse degenerative changes from C3-T1 with calcification of posterior   longitudinal ligament. CT chest.      Comparison 06/09/2021      Findings      There is cardiomegaly with diffuse coronary artery calcification. Moderately   enlarged mediastinal lymph nodes are noted, lymph nodes measuring up to 1.3   cm in the paratracheal region. The  trachea and major bronchi are patent. There is persistent but improving atelectasis and ground-glass densities in   the upper and lower lobes bilaterally. There is no pleural effusion. Degenerative changes are identified in the thoracic spine. Impression      Cardiomegaly with coronary artery calcification. Atelectasis and  multiple areas of diffuse ground-glass densities bilaterally   which may be due to CHF/edema and or pneumonia. There is no other traumatic   injury in the chest.      CT abdomen and pelvis.       Comparison August 23, 2022      Findings      There is decreased and heterogeneous attenuation of liver likely fatty   infiltration with the 8 mm hypodense lesion in the left hepatic lobe without   change. The gallbladder is absent. Spleen, pancreas, the adrenals and the   kidneys are normal except for a 1.1 cm left adrenal nodule. Nonspecific   lymph nodes are identified in the portacaval region. There is severe   calcification aorta and iliac arteries with common iliac arteries measuring   1.1 cm. Degenerative changes are identified in the lumbar spine. A fluid   collection is identified in the lower anterior abdominal wall which currently   measures 4.6 x 4.7 cm with improvement. Pelvis. The bladder is normal.  There is diverticulosis of colon without CT   evidence for diverticulitis. Appendix is upper normal without inflammation. Impression      There is no acute traumatic injury or acute inflammation in the abdomen   pelvis. Improving but persistent fluid collection in the subcutaneous tissue in the   lower anterior abdominal wall likely improving abscess. Scattered diverticulosis of colon. CT ABDOMEN PELVIS WO CONTRAST Additional Contrast? None   Final Result   No acute findings. There is stable atrophy with small vessel ischemic   disease and old cortical infarct in the right cerebellum. CT cervical spine. There is no acute fracture or dislocation in the cervical spine. The   prevertebral soft tissues are normal.  There is degenerative changes from   C3-T1 with osteophytes, multilevel disc bulges and calcification of posterior   longitudinal ligament. There is calcification of carotid arteries. Impression      No acute fractures. Diffuse degenerative changes from C3-T1 with calcification of posterior   longitudinal ligament. CT chest.      Comparison 06/09/2021      Findings      There is cardiomegaly with diffuse coronary artery calcification.   Moderately   enlarged mediastinal lymph nodes are noted, lymph nodes measuring up to 1.3   cm in the paratracheal region. The  trachea and major bronchi are patent. There is persistent but improving atelectasis and ground-glass densities in   the upper and lower lobes bilaterally. There is no pleural effusion. Degenerative changes are identified in the thoracic spine. Impression      Cardiomegaly with coronary artery calcification. Atelectasis and  multiple areas of diffuse ground-glass densities bilaterally   which may be due to CHF/edema and or pneumonia. There is no other traumatic   injury in the chest.      CT abdomen and pelvis. Comparison August 23, 2022      Findings      There is decreased and heterogeneous attenuation of liver likely fatty   infiltration with the 8 mm hypodense lesion in the left hepatic lobe without   change. The gallbladder is absent. Spleen, pancreas, the adrenals and the   kidneys are normal except for a 1.1 cm left adrenal nodule. Nonspecific   lymph nodes are identified in the portacaval region. There is severe   calcification aorta and iliac arteries with common iliac arteries measuring   1.1 cm. Degenerative changes are identified in the lumbar spine. A fluid   collection is identified in the lower anterior abdominal wall which currently   measures 4.6 x 4.7 cm with improvement. Pelvis. The bladder is normal.  There is diverticulosis of colon without CT   evidence for diverticulitis. Appendix is upper normal without inflammation. Impression      There is no acute traumatic injury or acute inflammation in the abdomen   pelvis. Improving but persistent fluid collection in the subcutaneous tissue in the   lower anterior abdominal wall likely improving abscess. Scattered diverticulosis of colon. CT CHEST WO CONTRAST   Final Result   No acute findings. There is stable atrophy with small vessel ischemic   disease and old cortical infarct in the right cerebellum. CT cervical spine.       There is no acute fracture or diverticulosis of colon without CT   evidence for diverticulitis. Appendix is upper normal without inflammation. Impression      There is no acute traumatic injury or acute inflammation in the abdomen   pelvis. Improving but persistent fluid collection in the subcutaneous tissue in the   lower anterior abdominal wall likely improving abscess. Scattered diverticulosis of colon. XR KNEE LEFT (1-2 VIEWS)   Final Result   No acute fracture or prosthesis complications. Joint effusion and inferior subluxation of the patella. XR KNEE RIGHT (1-2 VIEWS)   Final Result   Severe osteoarthritic changes most pronounced of the medial joint space   compartment. No evidence of fracture or subluxation.             ------------------------- NURSING NOTES AND VITALS REVIEWED ---------------------------  Date / Time Roomed:  10/12/2022  1:04 PM  ED Bed Assignment:  SANTOS/SANTOS    The nursing notes within the ED encounter and vital signs as below have been reviewed. BP (!) 167/81   Pulse 70   Temp 98.4 °F (36.9 °C)   Resp 18   Ht 5' 1\" (1.549 m)   Wt 204 lb (92.5 kg)   LMP 01/01/1990   SpO2 94%   BMI 38.55 kg/m²   Oxygen Saturation Interpretation: Normal      ------------------------------------------ PROGRESS NOTES ------------------------------------------  10:22 PM EDT  I have spoken with the patient and discussed todays results, in addition to providing specific details for the plan of care and counseling regarding the diagnosis and prognosis. Their questions are answered at this time and they are agreeable with the plan. I discussed at length with them reasons for immediate return here for re evaluation. They will followup with their primary care physician by calling their office tomorrow.       --------------------------------- ADDITIONAL PROVIDER NOTES ---------------------------------  At this time the patient is without objective evidence of an acute process requiring hospitalization or inpatient management. They have remained hemodynamically stable throughout their entire ED visit and are stable for discharge with outpatient follow-up. The plan has been discussed in detail and they are aware of the specific conditions for emergent return, as well as the importance of follow-up. Discharge Medication List as of 10/12/2022  5:09 PM        START taking these medications    Details   cefdinir (OMNICEF) 300 MG capsule Take 1 capsule by mouth 2 times daily for 7 days, Disp-14 capsule, R-0Normal      oxyCODONE-acetaminophen (PERCOCET) 5-325 MG per tablet Take 1 tablet by mouth every 8 hours as needed for Pain for up to 3 days. Intended supply: 3 days. Take lowest dose possible to manage pain, Disp-9 tablet, R-0Print             Diagnosis:  1. Syncope and collapse    2. Hepatic lesion    3. Diverticulosis    4. Abdominal wall abscess    5. Acute cystitis without hematuria        Disposition:  Patient's disposition: Discharge to home  Patient's condition is stable.        Chely Roth, DO  Resident  10/12/22 5621

## 2022-10-12 NOTE — DISCHARGE INSTRUCTIONS
Please follow-up with family physician and return to this emergency department if your symptoms persist or worsen. Take Omnicef as directed for the next week. Drink plenty fluids.

## 2022-10-12 NOTE — ED NOTES
Orthostatics completed, pt mobilized without assistance to the bathroom      Gabriel Dietz RN  10/12/22 [id number]

## 2022-10-14 ENCOUNTER — HOSPITAL ENCOUNTER (OUTPATIENT)
Age: 67
Discharge: HOME | End: 2022-10-14
Payer: MEDICARE

## 2022-10-14 PROCEDURE — 82705 FATS/LIPIDS FECES QUAL: CPT

## 2022-10-14 PROCEDURE — 89055 LEUKOCYTE ASSESSMENT FECAL: CPT

## 2022-10-14 PROCEDURE — 83520 IMMUNOASSAY QUANT NOS NONAB: CPT

## 2022-10-14 PROCEDURE — 83630 LACTOFERRIN FECAL (QUAL): CPT

## 2022-10-14 PROCEDURE — 87329 GIARDIA AG IA: CPT

## 2022-10-14 PROCEDURE — 87045 FECES CULTURE AEROBIC BACT: CPT

## 2022-10-14 PROCEDURE — 87328 CRYPTOSPORIDIUM AG IA: CPT

## 2022-10-15 LAB
REASON FOR REJECTION: NORMAL
REJECTED TEST: NORMAL
WHITE BLOOD CELLS (WBC), STOOL: NORMAL

## 2022-10-16 LAB — CULTURE, STOOL: NORMAL

## 2022-10-17 ENCOUNTER — HOSPITAL ENCOUNTER (EMERGENCY)
Age: 67
Discharge: HOME | End: 2022-10-17
Attending: EMERGENCY MEDICINE
Payer: MEDICARE

## 2022-10-17 ENCOUNTER — APPOINTMENT (OUTPATIENT)
Dept: GENERAL RADIOLOGY | Age: 67
End: 2022-10-17
Payer: MEDICARE

## 2022-10-17 ENCOUNTER — APPOINTMENT (OUTPATIENT)
Dept: CT IMAGING | Age: 67
End: 2022-10-17
Payer: MEDICARE

## 2022-10-17 VITALS
SYSTOLIC BLOOD PRESSURE: 198 MMHG | OXYGEN SATURATION: 94 % | DIASTOLIC BLOOD PRESSURE: 97 MMHG | BODY MASS INDEX: 38.51 KG/M2 | WEIGHT: 204 LBS | TEMPERATURE: 98.8 F | HEART RATE: 80 BPM | RESPIRATION RATE: 18 BRPM | HEIGHT: 61 IN

## 2022-10-17 DIAGNOSIS — K43.9 HERNIA OF ABDOMINAL WALL: ICD-10-CM

## 2022-10-17 DIAGNOSIS — R10.84 GENERALIZED ABDOMINAL PAIN: Primary | ICD-10-CM

## 2022-10-17 LAB
ALBUMIN SERPL-MCNC: 3.8 G/DL (ref 3.5–5.2)
ALP BLD-CCNC: 79 U/L (ref 35–104)
ALT SERPL-CCNC: 14 U/L (ref 0–32)
ANION GAP SERPL CALCULATED.3IONS-SCNC: 12 MMOL/L (ref 7–16)
AST SERPL-CCNC: 18 U/L (ref 0–31)
ATYPICAL LYMPHOCYTE RELATIVE PERCENT: 5 % (ref 0–4)
BASOPHILS ABSOLUTE: 0 E9/L (ref 0–0.2)
BASOPHILS RELATIVE PERCENT: 0 % (ref 0–2)
BILIRUB SERPL-MCNC: 0.3 MG/DL (ref 0–1.2)
BUN BLDV-MCNC: 24 MG/DL (ref 6–23)
CALCIUM SERPL-MCNC: 9.5 MG/DL (ref 8.6–10.2)
CHLORIDE BLD-SCNC: 99 MMOL/L (ref 98–107)
CO2: 23 MMOL/L (ref 22–29)
CREAT SERPL-MCNC: 1.2 MG/DL (ref 0.5–1)
CRYPTOSPORIDIUM ANTIGEN STOOL: NORMAL
EOSINOPHILS ABSOLUTE: 0 E9/L (ref 0.05–0.5)
EOSINOPHILS RELATIVE PERCENT: 0 % (ref 0–6)
GFR AFRICAN AMERICAN: 54
GFR SERPL CREATININE-BSD FRML MDRD: 49 ML/MIN/1.73
GIARDIA ANTIGEN STOOL: NORMAL
GLUCOSE BLD-MCNC: 108 MG/DL (ref 74–99)
HCT VFR BLD CALC: 43.1 % (ref 34–48)
HEMOGLOBIN: 13.8 G/DL (ref 11.5–15.5)
LACTIC ACID: 0.8 MMOL/L (ref 0.5–2.2)
LYMPHOCYTES ABSOLUTE: 1.03 E9/L (ref 1.5–4)
LYMPHOCYTES RELATIVE PERCENT: 7 % (ref 20–42)
MCH RBC QN AUTO: 32.1 PG (ref 26–35)
MCHC RBC AUTO-ENTMCNC: 32 % (ref 32–34.5)
MCV RBC AUTO: 100.2 FL (ref 80–99.9)
MONOCYTES ABSOLUTE: 0.52 E9/L (ref 0.1–0.95)
MONOCYTES RELATIVE PERCENT: 6 % (ref 2–12)
NEUTROPHILS ABSOLUTE: 7.05 E9/L (ref 1.8–7.3)
NEUTROPHILS RELATIVE PERCENT: 82 % (ref 43–80)
PDW BLD-RTO: 12.9 FL (ref 11.5–15)
PLATELET # BLD: 202 E9/L (ref 130–450)
PMV BLD AUTO: 10.5 FL (ref 7–12)
POTASSIUM REFLEX MAGNESIUM: 4.8 MMOL/L (ref 3.5–5)
PRO-BNP: 907 PG/ML (ref 0–125)
RBC # BLD: 4.3 E12/L (ref 3.5–5.5)
RBC # BLD: NORMAL 10*6/UL
REASON FOR REJECTION: NORMAL
REJECTED TEST: NORMAL
SODIUM BLD-SCNC: 134 MMOL/L (ref 132–146)
TOTAL PROTEIN: 7.3 G/DL (ref 6.4–8.3)
TROPONIN, HIGH SENSITIVITY: 13 NG/L (ref 0–9)
WBC # BLD: 8.6 E9/L (ref 4.5–11.5)

## 2022-10-17 PROCEDURE — 84484 ASSAY OF TROPONIN QUANT: CPT

## 2022-10-17 PROCEDURE — 6370000000 HC RX 637 (ALT 250 FOR IP)

## 2022-10-17 PROCEDURE — 85025 COMPLETE CBC W/AUTO DIFF WBC: CPT

## 2022-10-17 PROCEDURE — 83880 ASSAY OF NATRIURETIC PEPTIDE: CPT

## 2022-10-17 PROCEDURE — 74176 CT ABD & PELVIS W/O CONTRAST: CPT

## 2022-10-17 PROCEDURE — 99285 EMERGENCY DEPT VISIT HI MDM: CPT

## 2022-10-17 PROCEDURE — 83605 ASSAY OF LACTIC ACID: CPT

## 2022-10-17 PROCEDURE — 71045 X-RAY EXAM CHEST 1 VIEW: CPT

## 2022-10-17 PROCEDURE — 80053 COMPREHEN METABOLIC PANEL: CPT

## 2022-10-17 RX ORDER — 0.9 % SODIUM CHLORIDE 0.9 %
1000 INTRAVENOUS SOLUTION INTRAVENOUS ONCE
Status: DISCONTINUED | OUTPATIENT
Start: 2022-10-17 | End: 2022-10-17 | Stop reason: HOSPADM

## 2022-10-17 RX ORDER — OXYCODONE HYDROCHLORIDE AND ACETAMINOPHEN 5; 325 MG/1; MG/1
1 TABLET ORAL ONCE
Status: COMPLETED | OUTPATIENT
Start: 2022-10-17 | End: 2022-10-17

## 2022-10-17 RX ORDER — TRAMADOL HYDROCHLORIDE 50 MG/1
50 TABLET ORAL EVERY 8 HOURS PRN
Qty: 9 TABLET | Refills: 0 | Status: SHIPPED | OUTPATIENT
Start: 2022-10-17 | End: 2022-10-20

## 2022-10-17 RX ADMIN — OXYCODONE HYDROCHLORIDE AND ACETAMINOPHEN 1 TABLET: 5; 325 TABLET ORAL at 18:43

## 2022-10-17 ASSESSMENT — ENCOUNTER SYMPTOMS
VOMITING: 1
COLOR CHANGE: 0
SORE THROAT: 0
SHORTNESS OF BREATH: 1
CONSTIPATION: 0
EYE DISCHARGE: 0
SINUS PAIN: 0
DIARRHEA: 1
COUGH: 0
ABDOMINAL PAIN: 1
NAUSEA: 1

## 2022-10-17 ASSESSMENT — PAIN - FUNCTIONAL ASSESSMENT: PAIN_FUNCTIONAL_ASSESSMENT: 0-10

## 2022-10-17 ASSESSMENT — PAIN DESCRIPTION - ONSET: ONSET: ON-GOING

## 2022-10-17 ASSESSMENT — PAIN DESCRIPTION - PAIN TYPE: TYPE: ACUTE PAIN;CHRONIC PAIN

## 2022-10-17 ASSESSMENT — PAIN DESCRIPTION - DESCRIPTORS: DESCRIPTORS: BURNING

## 2022-10-17 ASSESSMENT — PAIN DESCRIPTION - FREQUENCY: FREQUENCY: CONTINUOUS

## 2022-10-17 ASSESSMENT — PAIN SCALES - GENERAL: PAINLEVEL_OUTOF10: 10

## 2022-10-17 ASSESSMENT — PAIN DESCRIPTION - LOCATION: LOCATION: ABDOMEN

## 2022-10-17 NOTE — ED NOTES
IV infiltrate, warm compress applied      Fatimah Sanchez RN  10/17/22 4921 Wound RN Visit (60 minutes)    Reason for visit: RN Wound Care dressing change    Wound background: RLQ NPWT, Right heel     Assessment: See Epic Wound Flowsheet    Pt premedicated, lidocaine used topically to RLQ NPWT, VAC changed by Amanda TA.   Right heel alleveda.

## 2022-10-17 NOTE — ED PROVIDER NOTES
Ileana Rivas is a 79-year-old female presents presenting with a history of abdominal wall hernia, diabetic neuropathy, HTN, CAD, morbid obesity, OA, COPD, PVD, and hiatal hernia. Patient is a 78 y.o. female presents with a chief complaint of abd pain, nausea and vomiting  This has been occurring for the past 3 days. Patient states that it gets better with nothing. Patient states that it gets worse with nothing. Patient states that it is severe in severity. Patient states it was acute in onset. She notes she was just seen in the ED 5 days ago and was given pain medications. They have seemed to help, but she has had nausea and vomiting despite taking antiemetics. She does report that she has been smoking marijuana frequently and this she believes helps her nausea and vomiting as it subsides for about an hour, however shortly after the nausea and vomiting returns. Patient denies any fevers, chills, headaches, lightheaded or dizziness, numbness or tingling, chest pain, dysuria, hematuria, constipation, hematochezia, leg swelling. She does report some shortness of breath and diarrhea. Review of Systems   Constitutional:  Negative for chills and fever. HENT:  Negative for congestion, sinus pain and sore throat. Eyes:  Negative for discharge and visual disturbance. Respiratory:  Positive for shortness of breath. Negative for cough. Cardiovascular:  Negative for chest pain and leg swelling. Gastrointestinal:  Positive for abdominal pain, diarrhea, nausea and vomiting. Negative for constipation. Endocrine: Negative for polyuria. Genitourinary:  Negative for difficulty urinating, dysuria, frequency and hematuria. Musculoskeletal:  Negative for arthralgias and joint swelling. Skin:  Negative for color change and rash. Neurological:  Negative for dizziness, weakness, light-headedness, numbness and headaches. All other systems reviewed and are negative.      Physical Exam  Constitutional:       General: She is not in acute distress. Appearance: Normal appearance. HENT:      Head: Normocephalic and atraumatic. Mouth/Throat:      Mouth: Mucous membranes are moist.      Pharynx: Oropharynx is clear. Eyes:      Extraocular Movements: Extraocular movements intact. Conjunctiva/sclera: Conjunctivae normal.      Pupils: Pupils are equal, round, and reactive to light. Cardiovascular:      Rate and Rhythm: Normal rate and regular rhythm. Pulses: Normal pulses. Heart sounds: Normal heart sounds. Pulmonary:      Effort: Pulmonary effort is normal.      Breath sounds: Normal breath sounds. No decreased breath sounds, wheezing or rhonchi. Abdominal:      General: Abdomen is flat. Palpations: Abdomen is soft. Tenderness: There is generalized abdominal tenderness. Hernia: A hernia is present. Hernia is present in the ventral area. Musculoskeletal:         General: No swelling. Normal range of motion. Cervical back: Normal range of motion and neck supple. Skin:     General: Skin is warm and dry. Neurological:      General: No focal deficit present. Mental Status: She is alert and oriented to person, place, and time. Psychiatric:         Mood and Affect: Mood normal.         Behavior: Behavior normal.        Procedures     MDM  Number of Diagnoses or Management Options  Generalized abdominal pain  Hernia of abdominal wall  Diagnosis management comments: Mae Leos is a 78-year-old female presenting to the ED with complaints of abdominal pain, nausea, and vomiting. Patient's lungs sound clear bilaterally and pulse ox was normal.  CBC was unremarkable. CMP reveals baseline creatinine of 1.2. BNP of 907. Lactic acid of 0.8. Troponin of 13 which is patient's baseline. CT of abdomen reveals midline supraumbilical hernia with fluid-filled contents no longer seen pockets of air within the hernia sac.   Chest x-ray reveals suspected CHF.  Plan to discharge patient to home and follow-up with PCP, general surgery, and gastroenterology discussed with patient. She is agreeable to plan. ED Course as of 10/19/22 0017   Mon Oct 17, 2022   1945 On repeat evaluation, patient does report improvement in pain after receiving Percocet. States that arm is sore after IV placement attempt. Discussed with patient today's unremarkable lab results as well as CT scan. Advised to follow-up with gastroenterology and general surgery. She is agreeable to plan, and requesting pain medication for discharge until she can follow-up with general surgery. [CP]      ED Course User Index  [CP] Diane Jane, DO        ED Course as of 10/19/22 0017   Mon Oct 17, 2022   1945 On repeat evaluation, patient does report improvement in pain after receiving Percocet. States that arm is sore after IV placement attempt. Discussed with patient today's unremarkable lab results as well as CT scan. Advised to follow-up with gastroenterology and general surgery. She is agreeable to plan, and requesting pain medication for discharge until she can follow-up with general surgery.  [CP]      ED Course User Index  [CP] Diane Jane, DO       --------------------------------------------- PAST HISTORY ---------------------------------------------  Past Medical History:  has a past medical history of Asthma, Atherosclerosis of native artery of left leg with rest pain ([address] CAD (coronary artery disease), Cellulitis and abscess of trunk, Cerebellar infarct (HCC), Chronic back pain, Chronic kidney disease, Chronic systolic congestive heart failure ([address] Chronic venous insufficiency, COPD (chronic obstructive pulmonary disease) ([address] COVID-19, Depression, Diabetes mellitus ([address] Diabetic neuropathy ([facility] [address] Diverticulosis, Fatty liver, Glaucoma, open angle, Hepatic encephalopathy ([address] Hiatal hernia, History of blood transfusion, Hyperlipidemia, Hyperplastic colon polyp, Hypertension, Incontinence, Liver mass, Morbid obesity ([address] Movement disorder, Myocardial infarction ([address] O2 dependent, Osteoarthritis, generalized, Pain in both lower legs, Peripheral vascular disease ([address] Pneumonia, Pulmonary edema, PVD (peripheral vascular disease) with claudication ([address] Sleep apnea, Status post peripheral artery angioplasty, Tobacco abuse, Tobacco abuse, Tobacco dependence, Tubular adenoma polyp of rectum, Urinary tract infection due to ESBL Klebsiella, and Ventral hernia. Past Surgical History:  has a past surgical history that includes knee surgery; Upper gastrointestinal endoscopy (12/20/12); Coronary artery bypass graft; Layered wound closure (Left, [id number]); Echo Complete (10/9/2013); polysomnography (1/2016); liver biopsy (1/8/2016); bronchial brush biopsy (1/25/2013); Breast surgery (2000); Cholecystectomy (YRS AGO); Cardiac surgery (12/2011); Cardiac catheterization (04/20/2015); Hysterectomy; joint replacement (2011); Upper gastrointestinal endoscopy (12/23/2016); Colonoscopy (11/15/2013); Colonoscopy (12/23/2016); Upper gastrointestinal endoscopy (02/08/2017); Endoscopy, colon, diagnostic (04/18/2017); Gallbladder surgery; angioplasty (11/13/2018); and laryngoscopy (N/A, 4/4/2022). Social History:  reports that she has been smoking cigarettes. She started smoking about 48 years ago. She has a 10.00 pack-year smoking history. She has never used smokeless tobacco. She reports that she does not currently use alcohol. She reports current drug use. Drug: Marijuana [employer]). Family History: family history includes Asthma in her father; Cancer in her mother. The patients home medications have been reviewed.     Allergies: Latex, Bee venom, Dilaudid [hydromorphone hcl], Dye [iodides], Keflex [cephalexin], Lasix [furosemide], Levaquin [levofloxacin in d5w], Lipitor, Lyrica [pregabalin], Morphine, Naproxen, Nefazodone, Norvasc [amlodipine], Oxycodone-acetaminophen, Shellfish-derived products, and Trazodone and nefazodone    -------------------------------------------------- RESULTS -------------------------------------------------  Labs:  Results for orders placed or performed during the hospital encounter of 10/17/22   CBC with Auto Differential   Result Value Ref Range    WBC 8.6 4.5 - 11.5 E9/L    RBC 4.30 3.50 - 5.50 E12/L    Hemoglobin 13.8 11.5 - 15.5 g/dL    Hematocrit 43.1 34.0 - 48.0 %    .2 (H) 80.0 - 99.9 fL    MCH 32.1 26.0 - 35.0 pg    MCHC 32.0 32.0 - 34.5 %    RDW 12.9 11.5 - 15.0 fL    Platelets 586 465 - 808 E9/L    MPV 10.5 7.0 - 12.0 fL    Neutrophils % 82.0 (H) 43.0 - 80.0 %    Lymphocytes % 7.0 (L) 20.0 - 42.0 %    Monocytes % 6.0 2.0 - 12.0 %    Eosinophils % 0.0 0.0 - 6.0 %    Basophils % 0.0 0.0 - 2.0 %    Neutrophils Absolute 7.05 1.80 - 7.30 E9/L    Lymphocytes Absolute 1.03 (L) 1.50 - 4.00 E9/L    Monocytes Absolute 0.52 0.10 - 0.95 E9/L    Eosinophils Absolute 0.00 (L) 0.05 - 0.50 E9/L    Basophils Absolute 0.00 0.00 - 0.20 E9/L    Atypical Lymphocytes Relative 5.0 (H) 0.0 - 4.0 %    RBC Morphology Normal    Comprehensive Metabolic Panel w/ Reflex to MG   Result Value Ref Range    Sodium 134 132 - 146 mmol/L    Potassium reflex Magnesium 4.8 3.5 - 5.0 mmol/L    Chloride 99 98 - 107 mmol/L    CO2 23 22 - 29 mmol/L    Anion Gap 12 7 - 16 mmol/L    Glucose 108 (H) 74 - 99 mg/dL    BUN 24 (H) 6 - 23 mg/dL    Creatinine 1.2 (H) 0.5 - 1.0 mg/dL    Est, Glom Filt Rate 49 >=60 mL/min/1.73    GFR African American 54     Calcium 9.5 8.6 - 10.2 mg/dL    Total Protein 7.3 6.4 - 8.3 g/dL    Albumin 3.8 3.5 - 5.2 g/dL    Total Bilirubin 0.3 0.0 - 1.2 mg/dL    Alkaline Phosphatase 79 35 - 104 U/L    ALT 14 0 - 32 U/L    AST 18 0 - 31 U/L   Brain Natriuretic Peptide   Result Value Ref Range    Pro- (H) 0 - 125 pg/mL   Lactic Acid   Result Value Ref Range    Lactic Acid 0.8 0.5 - 2.2 mmol/L   Troponin   Result Value Ref Range    Troponin, High Sensitivity 13 (H) 0 - 9 ng/L   SPECIMEN REJECTION   Result Value Ref Range    Rejected Test TRP5     Reason for Rejection see below        Radiology:  CT ABDOMEN PELVIS WO CONTRAST Additional Contrast? None   Final Result   Complicated midline supraumbilical hernia with fluid contents which appears   to be entrapped, but no longer seen pockets of air within the hernia sac. Possibility for an infected hernia is part of the differential diagnosis. There is still some stranding of fat planes surround the hernia sac in the   deep subcutaneous soft tissues the right para region anterior abdominal wall   and there is also thickening of the skin of adjacent to bili cul scar. XR CHEST PORTABLE   Final Result   Suspect CHF. Superimposed pneumonia cannot be excluded. ------------------------- NURSING NOTES AND VITALS REVIEWED ---------------------------  Date / Time Roomed:  10/17/2022  3:35 PM  ED Bed Assignment:  27/27    The nursing notes within the ED encounter and vital signs as below have been reviewed. BP (!) 198/97   Pulse 80   Temp 98.8 °F (37.1 °C) (Oral)   Resp 18   Ht 5' 1\" (1.549 m)   Wt 204 lb (92.5 kg)   LMP 01/01/1990   SpO2 94%   BMI 38.55 kg/m²   Oxygen Saturation Interpretation: Normal      ------------------------------------------ PROGRESS NOTES ------------------------------------------  7:59 PM EDT  I have spoken with the patient and discussed todays results, in addition to providing specific details for the plan of care and counseling regarding the diagnosis and prognosis. Their questions are answered at this time and they are agreeable with the plan. I discussed at length with them reasons for immediate return here for re evaluation. They will followup with their primary care physician by calling their office tomorrow.       --------------------------------- ADDITIONAL PROVIDER NOTES ---------------------------------  At this time the patient is without objective evidence of an acute process requiring hospitalization or inpatient management. They have remained hemodynamically stable throughout their entire ED visit and are stable for discharge with outpatient follow-up. The plan has been discussed in detail and they are aware of the specific conditions for emergent return, as well as the importance of follow-up. Discharge Medication List as of 10/17/2022  7:50 PM        START taking these medications    Details   traMADol (ULTRAM) 50 MG tablet Take 1 tablet by mouth every 8 hours as needed for Pain for up to 3 days. Intended supply: 3 days. Take lowest dose possible to manage pain, Disp-9 tablet, R-0Print             Diagnosis:  1. Generalized abdominal pain    2. Hernia of abdominal wall        Disposition:  Patient's disposition: Discharge to home  Patient's condition is stable. Patient was given return precautions. Labs were interpreted by me. Patient will follow up with their primary care provider. Patient is agreeable to this plan. Patient has remained stable throughout their stay in the ED. Patient was seen and evaluated by myself and my attending Stephan DO Darby. Assessment and Plan discussed with attending provider, please see attestation for final plan of care. This note was done using dictation software and there may be some grammatical errors associated with this. DO Kem Alvarez DO  Resident  10/17/22 2111      ATTENDING PROVIDER ATTESTATION:     Veto Aranda presented to the emergency department for evaluation of Shortness of Breath (Increasing SOB past few days, wears 02 at home to sleep ) and Abdominal Pain (+ N + V + D mid abd palpable mass + pain )    I have reviewed and discussed the case, including pertinent history (medical, surgical, family and social) and exam findings with the Resident and the Nurse assigned to Rajatshukri Manoj.   I have personally performed and/or participated in the history, exam, medical decision making, and procedures and agree with all pertinent clinical information. I have reviewed my findings and recommendations with Ale Grimm and members of family present at the time of disposition. I, Dr. Cynthia Ramon am the primary physician of record for this patient.     MDM: The patient is 78 y.o. female  with a past medical history of       Diagnosis Date    Asthma     Atherosclerosis of native artery of left leg with rest pain ([address] 11/09/2018    CAD (coronary artery disease) 2011    Cellulitis and abscess of trunk 04/14/2015    Cerebellar infarct ([address] 04/03/2019    Remote, rt. cerebellum, head CT scan, 1/9/19    Chronic back pain     Chronic kidney disease     Chronic systolic congestive heart failure ([address] 10/04/2013    Chronic venous insufficiency 10/11/2017    COPD (chronic obstructive pulmonary disease) ([address]     COVID-19     Depression     Diabetes mellitus ([address]     Diabetic neuropathy ([address]     Diverticulosis     Fatty liver 01/08/2016    per US    Glaucoma, open angle 02/01/2016    Mild-OU    Hepatic encephalopathy ([address] 02/07/2016    resolved    Hiatal hernia     History of blood transfusion     Hyperlipidemia     Hyperplastic colon polyp     Hypertension     Incontinence     Liver mass     Morbid obesity ([address]     Movement disorder     Myocardial infarction ([address] 2011    O2 dependent 09/16/2021    with bipap 3 liters    Osteoarthritis, generalized     Pain in both lower legs 10/11/2017    Peripheral vascular disease ([address]     Pneumonia 01/26/2014    Pulmonary edema     resolved    PVD (peripheral vascular disease) with claudication ([address] 08/30/2017    Sleep apnea     uses BI PAP    Status post peripheral artery angioplasty 10/31/2019    Tobacco abuse     Tobacco abuse 10/11/2017    Tobacco dependence 6/30/2022    Tubular adenoma polyp of rectum     Urinary tract infection due to ESBL Klebsiella 03/08/2017    Ventral hernia      presenting to the emergency department with a chief complaint of abdominal pain and shortness of breath. The patient did have labs and imaging which were reviewed. The patient had a CBC fairly unremarkable, CMP unremarkable, high-sensitivity troponin unremarkable, chest x-ray did read questionable CHF. Patient with proBNP only at 907, no lower extremity edema. Patient clinically does not have CHF. The patient did have a CT abdomen pelvis demonstrating fluid collection in the anterior abdominal wall. Patient has been admitted and evaluated for this. The patient does have outpatient follow-up with multiple specialist.  She is counseled to call her outpatient doctors and follow-up outpatient. My findings/plan: The primary encounter diagnosis was Generalized abdominal pain. A diagnosis of Hernia of abdominal wall was also pertinent to this visit. Discharge Medication List as of 10/17/2022  7:50 PM        START taking these medications    Details   traMADol (ULTRAM) 50 MG tablet Take 1 tablet by mouth every 8 hours as needed for Pain for up to 3 days. Intended supply: 3 days.  Take lowest dose possible to manage pain, Disp-9 tablet, R-0Print           Leann Smalls, [address] [place] [address],   10/19/22 0018

## 2022-10-17 NOTE — DISCHARGE INSTRUCTIONS
Follow-up with PCP, general surgery, and gastroenterology. Return to ED if symptoms change or worsen.

## 2022-10-18 LAB
FECAL NEUTRAL FAT: NORMAL
FECAL SPLIT FATS: NORMAL
Lab: NORMAL
PANCREATIC ELASTASE, FECAL: 572 UG/G
REPORT: NORMAL
THIS TEST SENT TO: NORMAL

## 2022-10-19 ASSESSMENT — ENCOUNTER SYMPTOMS
SHORTNESS OF BREATH: 1
COUGH: 0
ABDOMINAL PAIN: 1
VOMITING: 1
EYE DISCHARGE: 0
COLOR CHANGE: 0
SORE THROAT: 0
CONSTIPATION: 0
DIARRHEA: 1
NAUSEA: 1
SINUS PAIN: 0

## 2022-10-21 RX ORDER — BUMETANIDE 1 MG/1
1 TABLET ORAL DAILY
Qty: 30 TABLET | Refills: 10 | OUTPATIENT
Start: 2022-10-21

## 2022-11-05 DIAGNOSIS — J30.1 SEASONAL ALLERGIC RHINITIS DUE TO POLLEN: Primary | ICD-10-CM

## 2022-11-07 RX ORDER — ISOSORBIDE MONONITRATE 60 MG/1
60 TABLET, EXTENDED RELEASE ORAL DAILY
Qty: 90 TABLET | Refills: 3 | Status: SHIPPED | OUTPATIENT
Start: 2022-11-07

## 2022-11-07 RX ORDER — CETIRIZINE HYDROCHLORIDE 10 MG/1
TABLET ORAL
Qty: 90 TABLET | Refills: 1 | Status: SHIPPED | OUTPATIENT
Start: 2022-11-07

## 2022-11-08 DIAGNOSIS — J32.0 MAXILLARY SINUSITIS, UNSPECIFIED CHRONICITY: Primary | ICD-10-CM

## 2022-11-08 RX ORDER — AMOXICILLIN AND CLAVULANATE POTASSIUM 875; 125 MG/1; MG/1
1 TABLET, FILM COATED ORAL 2 TIMES DAILY
Qty: 20 TABLET | Refills: 0 | Status: SHIPPED | OUTPATIENT
Start: 2022-11-08 | End: 2022-11-18

## 2022-11-08 NOTE — TELEPHONE ENCOUNTER
Pt called in to [facility] an appt for ear and throat pain. She would like to be seen ASAP. Okay to [facility] with Maura Watson but the appt has to be after 9am. Please, advise. Karen Padilla can be reached at [phone]. Thank you.

## 2022-11-08 NOTE — TELEPHONE ENCOUNTER
Patient is having brownish drainage from her left ear, ear pain, yellow sinus drainage. Pt states she does not have any allergies to Augmentin.      Electronically signed by Meryle Glenn, MA on 11/8/22 at 9:44 AM EST

## 2022-11-10 ENCOUNTER — HOSPITAL ENCOUNTER (OUTPATIENT)
Dept: OTHER | Age: 67
Setting detail: THERAPIES SERIES
Discharge: HOME | End: 2022-11-10

## 2022-11-14 DIAGNOSIS — J32.0 MAXILLARY SINUSITIS, UNSPECIFIED CHRONICITY: Primary | ICD-10-CM

## 2022-11-14 NOTE — TELEPHONE ENCOUNTER
Pt would like to speak to the office regarding her mouth and tongue. Since she has been on the Antibiotic Amoxicillan for her left ear and sore throat the mouth and tongue have been so sore that she is having difficulty eating or drinking. Please contact pt. Pt is aware if this gets any worse she can use ED, Urgent care or contact her PCP. Awake

## 2022-11-14 NOTE — TELEPHONE ENCOUNTER
MA spoke with patient, advised to stop taking the Augmentin due to current effects of medication. MA will advise Dr. Agnes York and have a different abx prescribed. Pt is advised to go to the ED if any difficulty breathing or severe symptoms occur. Pt understood.

## 2022-11-15 RX ORDER — AZITHROMYCIN 250 MG/1
250 TABLET, FILM COATED ORAL SEE ADMIN INSTRUCTIONS
Qty: 6 TABLET | Refills: 0 | Status: SHIPPED | OUTPATIENT
Start: 2022-11-15 | End: 2022-11-20

## 2022-11-18 ENCOUNTER — HOSPITAL ENCOUNTER (OUTPATIENT)
Dept: OTHER | Age: 67
Setting detail: THERAPIES SERIES
Discharge: HOME | End: 2022-11-18

## 2022-11-22 ENCOUNTER — HOSPITAL ENCOUNTER (OUTPATIENT)
Dept: OTHER | Age: 67
Setting detail: THERAPIES SERIES
Discharge: HOME | End: 2022-11-22

## 2022-11-23 RX ORDER — BUMETANIDE 1 MG/1
1 TABLET ORAL DAILY
Qty: 30 TABLET | Refills: 10 | OUTPATIENT
Start: 2022-11-23

## 2022-11-23 RX ORDER — CARVEDILOL 25 MG/1
TABLET ORAL
Qty: 60 TABLET | Refills: 10 | OUTPATIENT
Start: 2022-11-23

## 2022-12-01 NOTE — PROGRESS NOTES
Chief Complaint:   Chief Complaint   Patient presents with    Circulatory Problem     PVD         HPI: Patient came to the office, for evaluation of vascular status of the legs, has undergone left superficial femoral artery and popliteal artery atherectomy and angioplasty by Dr. Phong Benites in 2018, has done well since that time    Patient walk short distances slowly with help of a walker, denies symptoms of rest pain    Patient tells me that she has multiple other medical issues      Patient denies any focal lateralizing neurological symptoms like loss of speech, vision or loss of function of extremity        Allergies   Allergen Reactions    Latex Hives    Bee Venom Anaphylaxis    Dilaudid [Hydromorphone Hcl] Itching    Dye [Iodides] Hives and Shortness Of Breath    Percocet [Oxycodone-Acetaminophen] Shortness Of Breath and Itching    Keflex [Cephalexin] Itching and Rash    Lasix [Furosemide] Other (See Comments)     Pt states she cramps up and gets headaches    Levaquin [Levofloxacin In D5w] Hives    Lipitor      MUSCLE SPASMS    Lyrica [Pregabalin]      Dream disturbances    Morphine Hives    Naproxen      Unsure of reaction;pt states able to take Aleve without difficulties    Nefazodone Other (See Comments)    Norvasc [Amlodipine] Swelling    Oxycodone-Acetaminophen Swelling    Shellfish-Derived Products     Trazodone And Nefazodone        Current Outpatient Medications   Medication Sig Dispense Refill    [facility] GAS RELIEF ULTRA STRENGTH 180 MG CAPS TAKE 1 CAPSULE BY MOUTH TWICE A DAY      metoclopramide (REGLAN) 5 MG tablet TAKE 1 TABLET BY MOUTH BEFORE LUNCH, 1 TABLET BEFORE DINNER AND 2 TAB S AT BEDTIME      ROBAFEN 100 MG/5ML syrup TAKE 20 MLS BY MOUTH 3 TIMES DAILY AS NEEDED FOR COUGH      ciclopirox (LOPROX) 0.77 % cream APPLY TOPICALLY 2 TIMES A DAY      Boswellia-Glucosamine-Vit D (OSTEO BI-FLEX ONE PER DAY PO) Take by mouth      calcium carbonate (OSCAL) 500 MG TABS tablet Take 500 mg by mouth daily      magnesium oxide (MAG-OX) 400 MG tablet Take 400 mg by mouth daily      zinc 50 MG TABS tablet Take 50 mg by mouth daily      vitamin D3 (CHOLECALCIFEROL) 25 MCG (1000 UT) TABS tablet TAKE 1 TABLET BY MOUTH EVERY DAY 90 tablet 1    SPIRIVA RESPIMAT 1.25 MCG/ACT AERS inhaler INHALE TWO (2) PUFFS BY MOUTH EVERY DAY 1 Inhaler 5    metoprolol succinate (TOPROL XL) 25 MG extended release tablet Take 1 tablet by mouth 2 times daily 180 tablet 3    isosorbide mononitrate (IMDUR) 60 MG extended release tablet TAKE (1) TABLET BY MOUTH DAILY 90 tablet 3    MYRBETRIQ 50 MG TB24 TAKE 1 TABLET BY MOUTH EVERY DAY 90 tablet 1    cetirizine (ZYRTEC) 10 MG tablet TAKE 1 TABLET BY MOUTH EVERY DAY 90 tablet 1    baclofen (LIORESAL) 10 MG tablet TAKE 1 TABLET BY MOUTH TWICE A DAY AS NEEDED 180 tablet 1    B-D UF III MINI PEN NEEDLES 31G X 5 MM MISC USE AS DIRECTED 100 each 11    bumetanide (BUMEX) 1 MG tablet TAKE (1) TABLET BY MOUTH DAILY 30 tablet 10    traZODone (DESYREL) 100 MG tablet TAKE (1) TABLET BY MOUTH NIGHTLY 30 tablet 10    DULoxetine (CYMBALTA) 60 MG extended release capsule TAKE ONE (1) CAPSULE BY MOUTH TWICE DAILY 180 capsule 1    montelukast (SINGULAIR) 10 MG tablet TAKE 1 TABLET BY MOUTH NIGHTLY 90 tablet 1    simvastatin (ZOCOR) 20 MG tablet TAKE 1 TABLET BY MOUTH NIGHTLY 90 tablet 3    azelastine (ASTELIN) 0.1 % nasal spray 1 spray by Nasal route 2 times daily Use in each nostril as directed 1 Bottle 3    ENTRESTO 24-26 MG per tablet TAKE ONE (1) TABLET BY MOUTH TWICE DAILY 180 tablet 3    oxybutynin (DITROPAN-XL) 10 MG extended release tablet       ondansetron (ZOFRAN) 4 MG tablet TAKE 1 TABLET BY MOUTH EVERY 6 HOURS AS NEEDED FOR NAUSEA      Insulin Degludec (TRESIBA FLEXTOUCH) 200 UNIT/ML SOPN INJECT 60 UNITS INTO THE SKIN BID 10 pen 2    blood glucose test strips (ACCU-CHEK GUIDE) strip 1 each by In Vitro route 3 times daily 300 each 11    Handicap Placard MISC by Does daily..      olopatadine (PATADAY) 0.2 % SOLN ophthalmic solution Place 1 drop into both eyes daily      Multiple Vitamins-Minerals (THERAPEUTIC MULTIVITAMIN-MINERALS) tablet Take 1 tablet by mouth daily      albuterol (PROVENTIL) (2.5 MG/3ML) 0.083% nebulizer solution Take 2.5 mg by nebulization every 6 hours as needed for Wheezing      BiPAP Machine MISC by Does not apply route nightly 27/23       No current facility-administered medications for this visit.         Past Medical History:   Diagnosis Date    Asthma     Atherosclerosis of native artery of left leg with rest pain ([address] 11/9/2018    C. difficile diarrhea 2015    CAD (coronary artery disease) 2011    Cellulitis and abscess of trunk 4/14/2015    Cerebellar infarct ([address] 4/3/2019    Remote, rt. cerebellum, head CT scan, 1/9/19    Chronic back pain     Chronic kidney disease     Chronic systolic congestive heart failure ([address] 10/4/2013    Chronic venous insufficiency 10/11/2017    COPD (chronic obstructive pulmonary disease) ([address]     Depression     Diabetes mellitus ([address]     Diabetic neuropathy ([address]     Diverticulosis     Fatty liver 1/8/2016    per [place]    Glaucoma, open angle 2/1/2016    Mild-OU    Hepatic encephalopathy ([address] 02/07/2016    resolved    Hiatal hernia     History of blood transfusion     Hyperlipidemia     Hyperplastic colon polyp     Hypertension     Incontinence     Liver mass     Morbid obesity ([address]     Movement disorder     Myocardial infarction ([address] 2011    Osteoarthritis, generalized     Pain in both lower legs 10/11/2017    Peripheral vascular disease ([address]     Pneumonia 1/26/2014    Pulmonary edema     resolved    PVD (peripheral vascular disease) with claudication ([address] 8/30/2017    Sleep apnea     uses BI PAP    Status post peripheral artery angioplasty 10/31/2019    Tobacco abuse     Tobacco abuse 10/11/2017    Tubular adenoma polyp of rectum     Urinary tract infection due to ESBL Klebsiella 03/08/2017    Ventral hernia        Past Surgical History:   Procedure Laterality Date    ANGIOPLASTY  11/13/2018    Dr. Nettie French & athrectomy L SFA & Popliteal    BREAST SURGERY  2000    bilateral reduction    BRONCHIAL BRUSH BIOPSY  1/25/2013    Dr Goldie Maza  04/20/2015    Dr. Ricardo Spears  12/2011     DR. Yanira López,  follows Dr Collette Ricker  11/15/2013    Dr Abdirizak Seo    COLONOSCOPY  12/23/2016    Dr Vaibhav Chahal adenoma & hyperplastic polyps, melanosis coli (repeat one year 12/2017)    CORONARY ARTERY BYPASS GRAFT      ECHO COMPLETE  10/9/2013         ENDOSCOPY, COLON, DIAGNOSTIC  04/18/2017    GALLBLADDER SURGERY      gallstones removed, [facility] 8/2017    HYSTERECTOMY      JOINT REPLACEMENT  2011    LEFT KNEE    KNEE SURGERY      left knee replacement    LAYER WOUND CLOSURE Left [id number]    L[facility] BIOPSY  1/8/2016    [facility]    POLYSOMNOGRAPHY  1/2016    [facility]    UPPER GASTROINTESTINAL ENDOSCOPY  12/20/12    UPPER GASTROINTESTINAL ENDOSCOPY  12/23/2016    Dr Laureen Lou UPPER GASTROINTESTINAL ENDOSCOPY  02/08/2017       Family History   Problem Relation Age of Onset    Cancer Mother     Asthma Father        Social History     Socioeconomic History    Marital status:       Spouse name: Not on file    Number of children: 2    Years of education: Not on file    Highest education level: Not on file   Occupational History    Occupation: disability   Social Needs    Financial resource strain: Not on file    Food insecurity     Worry: Not on file     Inability: Not on file   [employer] needs     Medical: Not on file     Non-medical: Not on file   Tobacco Use    Smoking status: Current Every Day Smoker     Packs/day: 0.25     Years: 40.00     Pack years: 10.00     Types: Cigarettes     Start date: 8/10/1974    Smokeless tobacco: Never Used    Tobacco comment: 1 pack every 3 days (6-7 cig)   Substance and Sexual Activity    Alcohol use: Yes     Alcohol/week: 0.0 standard drinks     Comment: social    Drug use: Yes     Types: Marijuana     Comment: \"every 4th or 5th day out of the week\"    Sexual activity: Never   Lifestyle    Physical activity     Days per week: Not on file     Minutes per session: Not on file    Stress: Not on file   Relationships    Social connections     Talks on phone: Not on file     Gets together: Not on file     Attends Jain service: Not on file     Active member of club or organization: Not on file     Attends meetings of clubs or organizations: Not on file     Relationship status: Not on file    Intimate partner violence     Fear of current or ex partner: Not on file     Emotionally abused: Not on file     Physically abused: Not on file     Forced sexual activity: Not on file   Other Topics Concern    Not on file   Social History Narrative    Pt lives with brother in her house-pt has never driven a car and depends on transportation       Review of Systems:    Eyes:  No blurring, diplopia or vision loss. Respiratory:  No cough, pleuritic chest pain, dyspnea, or wheezing. Chronic obstructive lung disease, obstructive sleep apnea     cardiovascular: No angina, palpitations . Hypertension, coronary artery disease with congestive heart failure  Musculoskeletal:  No arthritis or weakness. Significant musculoskeletal issues including the lumbar sacral spine  Neurologic:  No paralysis, paresis, paresthesia, seizures or headache. Endocrinology: Diabetes mellitus      Physical Exam:  General appearance:  Alert, awake, oriented x 3. No distress. Eyes:  Conjunctivae appear normal; PERRL  Neck:  No jugular venous distention, lymphadenopathy or thyromegaly. No evidence of carotid bruit  Lungs:  Clear to ausculation bilaterally. No rhonchi, crackles, wheezes  Heart:  Regular rate and rhythm. No rub or murmur  Abdomen:  Soft, non-tender.   No masses, organomegaly. Musculoskeletal : No joint effusions, tenderness swelling    Neuro: Speech is intact. Moving all extremities. No focal motor or sensory deficits      Extremities:  Both feet are warm to touch. The color of both feet is normal.        Pulses Right  Left    Brachial 3 3    Radial    3=normal   Femoral 2 2  2=diminished   Popliteal    1=barely palpable   Dorsalis pedis    0=absent   Posterior tibial 0 0  4=aneurysmal             Other pertinent information:1. The past medical records were reviewed. Assessment:    1. PVD (peripheral vascular disease) with claudication ([address]    2. Status post peripheral artery angioplasty              Plan:       Discussed the patient regarding all options, counseled regarding weight loss, follow-up lower extremity arterial Doppler and call me as needed if any increasing symptoms              Patient was instructed to continue walking program and to call if any worsening of symptoms and to call if any focal lateralizing neurological symptoms like loss of speech, vision or loss of function of extremity. All the questions were answered. Orders Placed This Encounter   Procedures    VL LOWER EXTREMITY ARTERIAL SEGMENTAL PRESSURES W PPG             Indicated follow-up: Return in about 1 year (around 12/10/2021), or if symptoms worsen or fail to improve. none

## 2022-12-13 ENCOUNTER — OFFICE VISIT (OUTPATIENT)
Dept: CARDIOLOGY CLINIC | Age: 67
End: 2022-12-13
Payer: MEDICARE

## 2022-12-13 VITALS
RESPIRATION RATE: 14 BRPM | HEART RATE: 72 BPM | SYSTOLIC BLOOD PRESSURE: 142 MMHG | DIASTOLIC BLOOD PRESSURE: 80 MMHG | BODY MASS INDEX: 36.07 KG/M2 | HEIGHT: 62 IN | WEIGHT: 196 LBS

## 2022-12-13 DIAGNOSIS — I25.10 CORONARY ARTERY DISEASE INVOLVING NATIVE CORONARY ARTERY OF NATIVE HEART WITHOUT ANGINA PECTORIS: Primary | ICD-10-CM

## 2022-12-13 PROCEDURE — 3078F DIAST BP <80 MM HG: CPT | Performed by: INTERNAL MEDICINE

## 2022-12-13 PROCEDURE — G8417 CALC BMI ABV UP PARAM F/U: HCPCS | Performed by: INTERNAL MEDICINE

## 2022-12-13 PROCEDURE — G8484 FLU IMMUNIZE NO ADMIN: HCPCS | Performed by: INTERNAL MEDICINE

## 2022-12-13 PROCEDURE — 3017F COLORECTAL CA SCREEN DOC REV: CPT | Performed by: INTERNAL MEDICINE

## 2022-12-13 PROCEDURE — G8427 DOCREV CUR MEDS BY ELIG CLIN: HCPCS | Performed by: INTERNAL MEDICINE

## 2022-12-13 PROCEDURE — 99214 OFFICE O/P EST MOD 30 MIN: CPT | Performed by: INTERNAL MEDICINE

## 2022-12-13 PROCEDURE — 4004F PT TOBACCO SCREEN RCVD TLK: CPT | Performed by: INTERNAL MEDICINE

## 2022-12-13 PROCEDURE — 1090F PRES/ABSN URINE INCON ASSESS: CPT | Performed by: INTERNAL MEDICINE

## 2022-12-13 PROCEDURE — 1123F ACP DISCUSS/DSCN MKR DOCD: CPT | Performed by: INTERNAL MEDICINE

## 2022-12-13 PROCEDURE — 3074F SYST BP LT 130 MM HG: CPT | Performed by: INTERNAL MEDICINE

## 2022-12-13 PROCEDURE — G8399 PT W/DXA RESULTS DOCUMENT: HCPCS | Performed by: INTERNAL MEDICINE

## 2022-12-13 PROCEDURE — 93000 ELECTROCARDIOGRAM COMPLETE: CPT | Performed by: INTERNAL MEDICINE

## 2022-12-13 RX ORDER — SIMVASTATIN 20 MG
20 TABLET ORAL NIGHTLY
Qty: 90 TABLET | Refills: 3 | Status: SHIPPED | OUTPATIENT
Start: 2022-12-13

## 2022-12-13 RX ORDER — FAMOTIDINE 40 MG/1
TABLET, FILM COATED ORAL
COMMUNITY
Start: 2022-10-06

## 2022-12-13 RX ORDER — PEN NEEDLE, DIABETIC 29 G X1/2"
NEEDLE, DISPOSABLE MISCELLANEOUS
COMMUNITY
Start: 2022-10-14

## 2022-12-13 RX ORDER — TIOTROPIUM BROMIDE INHALATION SPRAY 1.56 UG/1
SPRAY, METERED RESPIRATORY (INHALATION)
COMMUNITY
Start: 2022-10-12

## 2022-12-13 RX ORDER — HYDROCODONE BITARTRATE AND ACETAMINOPHEN 5; 325 MG/1; MG/1
TABLET ORAL
COMMUNITY
Start: 2022-11-16 | End: 2022-12-13

## 2022-12-13 RX ORDER — HYDRALAZINE HYDROCHLORIDE 50 MG/1
50 TABLET, FILM COATED ORAL 3 TIMES DAILY
Qty: 270 TABLET | Refills: 3 | Status: SHIPPED | OUTPATIENT
Start: 2022-12-13

## 2022-12-13 RX ORDER — BUMETANIDE 1 MG/1
TABLET ORAL
Qty: 90 TABLET | Refills: 3 | Status: SHIPPED | OUTPATIENT
Start: 2022-12-13

## 2022-12-13 RX ORDER — BUDESONIDE 3 MG/1
CAPSULE, COATED PELLETS ORAL
COMMUNITY
Start: 2022-10-06

## 2022-12-13 RX ORDER — RIFAXIMIN 550 MG/1
TABLET ORAL
COMMUNITY
Start: 2022-10-18

## 2022-12-13 RX ORDER — DEXLANSOPRAZOLE 60 MG/1
CAPSULE, DELAYED RELEASE ORAL
COMMUNITY
Start: 2022-09-27

## 2022-12-13 RX ORDER — CARVEDILOL 25 MG/1
25 TABLET ORAL 2 TIMES DAILY WITH MEALS
Qty: 180 TABLET | Refills: 3 | Status: SHIPPED | OUTPATIENT
Start: 2022-12-13

## 2022-12-13 NOTE — PATIENT INSTRUCTIONS
She is on guideline directed medical therapy for heart failure with improved ejection fraction, continue Entresto 24/26 mg  p.o. twice daily, spironolactone 25 mg po daily  and Coreg  25 mg p.o. twice daily  Continue Bumex to Bumex to 1 mg po daily due to increased urination. She was strongly encouraged to adhere to strict cardiac diet and restrict daily salt intake to less than 2 g  Continue rest of the medications including hydralazine 25 mg 3 times a day and continue Imdur 60 mg p.o. daily. All her cardiac medications are reviewed and refills were given for a month. She was advised again to stop smoking and told me that she does not want to quit. Follow up in one 6 months.

## 2022-12-13 NOTE — PROGRESS NOTES
OUTPATIENT CARDIOLOGY FOLLOW-UP    Name: Silvino Foster    Age: 78 y.o. Primary Care Physician: Eleazar Estevez DO    Date of Service: 12/13/2022    Chief Complaint:   Chief Complaint   Patient presents with    3 Month Follow-Up    Coronary Artery Disease       Interim History:  Mrs. Brenda Ashley is a 14-year-old  female with history of for coronary artery disease underwent CABG in 2011 and history of severe LV dysfunction with an EF of 25% based on echocardiogram done in 2013, cardiac cath in 2013 showed no progression of disease, chronic right bundle-branch block, type II diabetes, hypertension, hyperlipidemia, morbid obesity, active tobacco use still smoking about 10 cigarettes a day, marijuana use, obstructive sleep apnea on CPAP and history of severe peripheral vascular disease came for follow-up visit. She was last seen in the office was on 7/6/2022, Since her last evaluation, she underwent hiatal hernia surgery on 8/11/22 without any perioperative cardiac events. She was also hospitalized in 8/22 with COPD exacerbation. She was hospitalized from 7/15/2021 to 7/21/2021 with acute hypoxic respiratory failure secondary to acute COPD exacerbation and acute on chronic HFpEF. Since she was discharged from the hospital she is feeling much better however, she still smoking marijuana and tobacco.  She denies any significant leg edema. she was hospitalized from 4/19/2021 to 4/25/2021 with a COVID-19 pneumonia and was treated with steroids and antibiotics. .  She underwent left popliteal and tibial trunk atherectomy on 11/13/2018 and was initiated on plavix and aspirin. She was discharged home on oxygen and she has been using mainly at nighttime. She denies any chest pain, increased dyspnea or leg edema. She is still smoking about 5 cigarettes a day and does not want to quit.       She is still having  nausea and stomach hurts after eating, had hiatal hernia surgery without any significant cardiac issues. She still has intermittent cough with expectoration of sputum. Now, her blood pressures have been well controlled. She denies any dizziness, orthopnea, paroxysmal nocturnal dyspnea or leg edema. She has multiple allergies to grass and weeds. She denies any fever or chills. No palpitations, chest pain, syncope or presyncope. She is complaint with her medications and denies taking any over-the-counter medications. She denies using any other illicit drugs other than marijuana. She told me that she is complaint with her salt and fluid intake. SR on EKG.     Review of Systems:   Cardiac: As per HPI  General: No fever, chills  Pulmonary: As per HPI  HEENT: No visual disturbances, difficult swallowing  GI: No nausea, vomiting  Endocrine: No thyroid disease or DM  Musculoskeletal: PEDROZA x 4, no focal motor deficits  Skin: Intact, no rashes  Neuro/Psych: No headache or seizures    Past Medical History:  Past Medical History:   Diagnosis Date    Asthma     Atherosclerosis of native artery of left leg with rest pain ([address] 11/09/2018    CAD (coronary artery disease) 2011    Cellulitis and abscess of trunk 04/14/2015    Cerebellar infarct ([address] 04/03/2019    Remote, rt. cerebellum, head CT scan, 1/9/19    Chronic back pain     Chronic kidney disease     Chronic systolic congestive heart failure ([address] 10/04/2013    Chronic venous insufficiency 10/11/2017    COPD (chronic obstructive pulmonary disease) ([address]     COVID-19     Depression     Diabetes mellitus ([address]     Diabetic neuropathy ([address]     Diverticulosis     Fatty liver 01/08/2016    per US    Glaucoma, open angle 02/01/2016    Mild-OU    Hepatic encephalopathy ([address] 02/07/2016    resolved    Hiatal hernia     History of blood transfusion     Hyperlipidemia     Hyperplastic colon polyp     Hypertension     Incontinence     Liver mass     Morbid obesity ([address]     Movement disorder     Myocardial infarction ([address] 2011    O2 dependent 09/16/2021    with bipap 3 liters    Osteoarthritis, generalized     Pain in both lower legs 10/11/2017    Peripheral vascular disease ([facility]     Pneumonia 01/26/2014    Pulmonary edema     resolved    PVD (peripheral vascular disease) with claudication ([address] 08/30/2017    Sleep apnea     uses BI PAP    Status post peripheral artery angioplasty 10/31/2019    Tobacco abuse     Tobacco abuse 10/11/2017    Tobacco dependence 6/30/2022    Tubular adenoma polyp of rectum     Urinary tract infection due to ESBL Klebsiella 03/08/2017    Ventral hernia        Past Surgical History:  Past Surgical History:   Procedure Laterality Date    ANGIOPLASTY  11/13/2018    Dr. Eli Push & athrectomy L SFA & Popliteal    BREAST SURGERY  2000    bilateral reduction    BRONCHIAL BRUSH BIOPSY  1/25/2013    Dr Benji Jaime  04/20/2015    Dr. Roldan Askew  12/2011     DR. Yasmine Sharma,  follows Dr Aldo Byrd  11/15/2013    Dr Vika Pang    COLONOSCOPY  12/23/2016    Dr Jesse Elizondo adenoma & hyperplastic polyps, melanosis coli (repeat one year 12/2017)    CORONARY ARTERY BYPASS GRAFT      ECHO COMPLETE  10/9/2013         ENDOSCOPY, COLON, DIAGNOSTIC  04/18/2017    GALLBLADDER SURGERY      gallstones removed, [facility] 8/2017    HYSTERECTOMY (CERVIX STATUS UNKNOWN)      JOINT REPLACEMENT  2011    LEFT KNEE    KNEE SURGERY      left knee replacement    LARYNGOSCOPY N/A 4/4/2022    DIRECT [facility]NGOS[facility] WITH BIOPSY [id number]    LIVER BIOPSY  1/8/2016    [facility]    POLYSOMNOGRAPHY  1/2016    [facility]    UPPER GASTROINTESTINAL ENDOSCOPY  12/20/12    UPPER GASTROINTESTINAL ENDOSCOPY  12/23/2016    Dr Maribell Key GASTROINTESTINAL ENDOSCOPY  02/08/2017       Family History:  Family History   Problem Relation Age of Onset    Cancer Mother     Asthma Father        Social History:  Social History     Socioeconomic History Marital status:      Spouse name: Not on file    Number of children: 2    Years of education: Not on file    Highest education level: Not on file   Occupational History    Occupation: disability   Tobacco Use    Smoking status: Every Day     Packs/day: 0.25     Years: 40.00     Pack years: 10.00     Types: Cigarettes     Start date: 8/10/1974    Smokeless tobacco: Never    Tobacco comments:     2 cigs daily   Vaping Use    Vaping Use: Never used   Substance and Sexual Activity    Alcohol use: Not Currently     Comment: social    Drug use: Yes     Types: Marijuana [place]ter)     Comment: \"every 4th or 5th day out of the week\"    Sexual activity: Not on file   Other Topics Concern    Not on file   Social History Narrative    Pt lives with brother in her house-pt has never driven a car and depends on transportation     Social Determinants of Health     Financial Resource Strain: Not on file   Food Insecurity: Not on file   Transportation Needs: Not on file   Physical Activity: Not on file   Stress: Not on file   Social Connections: Not on file   Intimate Partner Violence: Not on file   Housing Stability: Not on file       Allergies:   Allergies   Allergen Reactions    Latex Hives    Bee Venom Anaphylaxis    Dilaudid [Hydromorphone Hcl] Itching    Dye [Iodides] Hives and Shortness Of Breath    Keflex [Cephalexin] Itching and Rash    Acetaminophen     Bee Pollen     Lasix [Furosemide] Other (See Comments)     Pt states she cramps up and gets headaches    Levaquin [Levofloxacin In D5w] Hives    Lipitor      MUSCLE SPASMS    Lyrica [Pregabalin]      Dream disturbances    Morphine Hives    Naproxen      Unsure of reaction;pt states able to take Aleve without difficulties    Nefazodone Other (See Comments)    Norvasc [Amlodipine] Swelling    Oxycodone-Acetaminophen Swelling    Shellfish-Derived Products     Trazodone And Nefazodone        Current Medications:  Current Outpatient Medications   Medication Sig Dispense Refill    budesonide (ENTOCORT EC) 3 MG extended release capsule TAKE 1 CAPSULE BY MOUTH THREE TIMES A DAY      dexlansoprazole (DEXILANT) 60 MG CPDR delayed release capsule TAKE 1 CAPSULE BY MOUTH EVERY DAY      famotidine (PEPCID) 40 MG tablet TAKE ONE TABLET DAILY 1/2 HOUR BEFORE DINNER      BD ULTRA-FINE PEN NEEDLES 29G X 12.7MM MISC USE AS DIRECTED 3 TIMES A DAY      XIFAXAN 550 MG tablet TAKE 1 TABLET BY MOUTH EVERY 8 HOURS FOR 2 WEEKS      SPIRIVA RESPIMAT 1.25 MCG/ACT AERS inhaler INHALE TWO (2) PUFFS BY MOUTH EVERY DAY      isosorbide mononitrate (IMDUR) 60 MG extended release tablet Take 1 tablet by mouth daily 90 tablet 3    cetirizine (ZYRTEC) 10 MG tablet TAKE 1 TABLET BY MOUTH EVERY DAY 90 tablet 1    cyclobenzaprine (FLEXERIL) 5 MG tablet TAKE 1 TABLET BY MOUTH EVERY 12 HOURS FOR 2 WEEKS AS NEEDED FOR MUSCLE SPASM      oxybutynin (DITROPAN-XL) 10 MG extended release tablet Take 10 mg by mouth daily      BREO ELLIPTA 100-25 MCG/INH AEPB inhaler Inhale 1 puff into the lungs daily      YUPELRI 175 MCG/3ML SOLN Inhale 3 mLs into the lungs daily      azelastine (ASTELIN) 0.1 % nasal spray 2 sprays by Nasal route in the morning and 2 sprays before bedtime. Use in each nostril as directed. 120 mL 5    isosorbide mononitrate (IMDUR) 60 MG extended release tablet Take 60 mg by mouth in the morning. fluticasone (FLONASE) 50 MCG/ACT nasal spray 2 sprays by Each Nostril route in the morning.  16 g 5    bumetanide (BUMEX) 1 MG tablet Take 1 tablet by mouth daily 90 tablet 3    carvedilol (COREG) 25 MG tablet Take 1 tablet by mouth 2 times daily (with meals) 180 tablet 3    sacubitril-valsartan (ENTRESTO) 24-26 MG per tablet Take 1 tablet by mouth 2 times daily 180 tablet 3    aspirin 81 MG chewable tablet Take 1 tablet by mouth daily 90 tablet 3    hydrALAZINE (APRESOLINE) 50 MG tablet Take 1 tablet by mouth 3 times daily 270 tablet 3    simvastatin (ZOCOR) 20 MG tablet Take 1 tablet by mouth nightly 90 tablet 3    nitroGLYCERIN (NITROSTAT) 0.3 MG SL tablet Place 0.3 mg under the tongue every 5 minutes as needed for Chest pain up to max of 3 total doses. If no relief after 1 dose, call 911.      acetaminophen (TYLENOL) 325 MG tablet Take 1,000 mg by mouth every 6 hours as needed for Pain       DULoxetine (CYMBALTA) 60 MG extended release capsule Take 60 mg by mouth 2 times daily Patient only has supply to take once a day      gabapentin (NEURONTIN) 100 MG capsule Take 100 mg by mouth 2 times daily. Insulin Degludec (TRESIBA FLEXTOUCH) 200 UNIT/ML SOPN Inject 50 Units into the skin at bedtime      insulin lispro, 1 Unit Dial, 100 UNIT/ML SOPN Inject 0-6 Units into the skin 3 times daily (before meals) *Per Sliding Scale*      montelukast (SINGULAIR) 10 MG tablet Take 10 mg by mouth nightly      traZODone (DESYREL) 100 MG tablet Take 100 mg by mouth nightly      vitamin D (CHOLECALCIFEROL) 25 MCG (1000 UT) TABS tablet Take 1,000 Units by mouth daily      docusate sodium (COLACE) 100 MG capsule Take 200 mg by mouth at bedtime        No current facility-administered medications for this visit. Physical Exam:  BP (!) 142/80   Pulse 72   Resp 14   Ht 5' 2\" (1.575 m)   Wt 196 lb (88.9 kg)   LMP 01/01/1990   BMI 35.85 kg/m²   Wt Readings from Last 3 Encounters:   12/13/22 196 lb (88.9 kg)   10/17/22 204 lb (92.5 kg)   10/12/22 204 lb (92.5 kg)     Appearance: Awake, alert and oriented x 3, no acute respiratory distress she is morbidly obese.   Skin: Intact, no rash  Head: Normocephalic, atraumatic  Eyes: EOMI, no conjunctival erythema  ENMT: No pharyngeal erythema, MMM, no rhinorrhea  Neck: Supple, no elevated JVP, no carotid bruits  Lungs: Clear to auscultation bilaterally except for scattered wheezing  Cardiac: Regular rate and rhythm, +S1S2, no murmurs apparent  Abdomen: Soft, nontender, +bowel sounds  Extremities: Moves all extremities x 4, trace lower extremity edema  Neurologic: No focal motor deficits apparent, normal mood and affect, alert and oriented x 3  Peripheral Pulses: Intact posterior tibial pulses bilaterally    Laboratory Tests:  Lab Results   Component Value Date    CREATININE 1.2 (H) 10/17/2022    BUN 24 (H) 10/17/2022     10/17/2022    K 4.8 10/17/2022    CL 99 10/17/2022    CO2 23 10/17/2022     Lab Results   Component Value Date/Time    MG 1.6 04/18/2022 04:12 AM     Lab Results   Component Value Date    WBC 8.6 10/17/2022    HGB 13.8 10/17/2022    HCT 43.1 10/17/2022    .2 (H) 10/17/2022     10/17/2022     Lab Results   Component Value Date    ALT 14 10/17/2022    AST 18 10/17/2022    ALKPHOS 79 10/17/2022    BILITOT 0.3 10/17/2022     Lab Results   Component Value Date    CKTOTAL 88 01/05/2016    CKMB 1.2 01/05/2016    TROPONINI <0.01 04/19/2021    TROPONINI <0.01 03/12/2021    TROPONINI <0.01 09/19/2019     Lab Results   Component Value Date    INR 1.0 08/23/2022    INR 1.1 04/19/2021    INR 1.0 01/04/2019    PROTIME 11.5 08/23/2022    PROTIME 12.6 (H) 04/19/2021    PROTIME 11.7 01/04/2019     Lab Results   Component Value Date    TSH 0.646 02/05/2021     Lab Results   Component Value Date    LABA1C 6.7 (H) 08/24/2022     No results found for: EAG  Lab Results   Component Value Date    CHOL 129 04/12/2022    CHOL 131 02/05/2021    CHOL 162 06/16/2020     Lab Results   Component Value Date    TRIG 114 04/12/2022    TRIG 219 (H) 02/05/2021    TRIG 89 06/16/2020     Lab Results   Component Value Date    HDL 32 04/12/2022    HDL 27 02/05/2021    HDL 46 06/16/2020     Lab Results   Component Value Date    LDLCALC 74 04/12/2022    LDLCALC 60 02/05/2021    LDLCALC 98 06/16/2020     Lab Results   Component Value Date    LABVLDL 23 04/12/2022    LABVLDL 44 02/05/2021    LABVLDL 18 06/16/2020     No results found for: CHOLHDLRATIO    Cardiac Tests:  ECG: Normal sinus rhythm, RBBB abnormal ECG, no changes compared to prior EKGS        Echocardiogram: 1/4/2017  LVEF 35-45%.  LV diffuse hypokinesis,mild MR  TTE- 6/5/2018:LVEF 65%, moderate LVH, stage II diastolic dysfunction,    CZQ-0/6/7128-FJGY 65%, moderate LVH, stage II diastolic dysfunction,    TTE- 4/11/2022:  Technically difficult examination. Severe concentric left ventricular hypertrophy. Left ventricular diastolic filling is elevated . Ejection fraction is visually estimated at 50 to 55%. Normal right ventricular size and function. Mild mitral regurgitation is present. Mild tricuspid regurgitation. Stress test: 6/05/2018:  1. Pharmacologically-induced perfusion defect involving the lateral   wall, without evidence of reversibility. 2. Diffusely hypokinetic left ventricular wall motion with a focal   area of akinesia involving the mid lateral wall. 3. Left ventricular ejection fraction of 42 %     Lexiscan nuclear stress test 4/11/2022:  1: There is a medium size, moderate intensity scar in the inferior,   inferior lateral and lateral wall with partial reversibility. 2  The inferior and inferior lateral wall appears mildly hypokinetic   with estimated left ventricular ejection fraction of 40%   3: Low dose CT attenuation correction protocol was utilized for this   study   4: Please see separate report for EKG and hemodynamic aspect of the   stress test.   5: RISK SCAN: Intermediate risk scan             The ASCVD Risk score ([place] DK, et al., 2019) failed to calculate for the following reasons: The valid total cholesterol range is 130 to 320 mg/dL  [facility]-4/20/2015:  IMPRESSION:   1. Atherosclerotic coronary disease. a. Status post remote bypass grafting. Patent SVG to OM circumflex. b.    Fahad Labrador to selectively cannulate left internal mammary artery      graft, but there is no  significant disease in the LAD itself. 2.    Moderate global left ventricular systolic dysfunction.     ASSESSMENT:  Heart failure with an improved ejection fraction with an EF of 40-45%-->65%>>>50-55%, euvolemic and hemodynamically stable   ACC/AHA, stage C., NYHA functional class II, now euvolemic. Diastolic dysfunction   CAD, status post CABG in 2011 LIMA to LAD, SVG to OM 1  Severer obesity with obstructive sleep apnea on Trilogy , unable to tolerate C-pap  Hypertension-stable and a little high  Hyperlipidemia on statin  Type II diabetes  Tobacco and marijuana use, still smoking about 5 to 6 cigarettes a day. PAD, S/p left popliteal and tibial atherectomy/angioplasty -stable  Chronic RBBB. H/o COVID-19 pneumonia, recovered  COPD secondary tobacco use disorder  Chronic bronchitis secondary to tobacco use. Marijuana use    Plan:   She is on guideline directed medical therapy for heart failure with improved ejection fraction, continue Entresto 24/26 mg  p.o. twice daily,   and Coreg  25 mg p.o. twice daily. Spironolactone was stopped due to ALEC and hyperkalemia  Continue Bumex 1 mg po daily due to increased urination. She was strongly encouraged to adhere to strict cardiac diet and restrict daily salt intake to less than 2 g  Continue rest of the medications including hydralazine 25 mg 3 times a day and continue Imdur 60 mg p.o. daily. All her cardiac medications are reviewed and refills were given. She was advised again to stop smoking and told me that she does not want to quit. Follow up in one 6 months. The patient's current medication list, allergies, problem list and results of all previously ordered testing were reviewed at today's visit.   Nancy Rao MD  [facility]) Cardiology

## 2022-12-20 ENCOUNTER — PREP FOR PROCEDURE (OUTPATIENT)
Dept: GASTROENTEROLOGY | Age: 67
End: 2022-12-20

## 2022-12-20 RX ORDER — CARVEDILOL 25 MG/1
TABLET ORAL
Qty: 60 TABLET | Refills: 10 | OUTPATIENT
Start: 2022-12-20

## 2022-12-20 RX ORDER — BUMETANIDE 1 MG/1
TABLET ORAL
Qty: 30 TABLET | Refills: 10 | OUTPATIENT
Start: 2022-12-20

## 2022-12-21 NOTE — PROGRESS NOTES
Mrs Daniel Ross left message to cancel appointment for 12-22-22. I called her twice and left a message to please return call to reschedule her appointment.

## 2022-12-22 ENCOUNTER — HOSPITAL ENCOUNTER (OUTPATIENT)
Dept: OTHER | Age: 67
Discharge: HOME | End: 2022-12-22

## 2023-01-09 ENCOUNTER — HOSPITAL ENCOUNTER (OUTPATIENT)
Dept: OTHER | Age: 68
Setting detail: THERAPIES SERIES
Discharge: HOME | End: 2023-01-09
Payer: MEDICARE

## 2023-01-09 VITALS
DIASTOLIC BLOOD PRESSURE: 78 MMHG | RESPIRATION RATE: 16 BRPM | OXYGEN SATURATION: 93 % | BODY MASS INDEX: 35.12 KG/M2 | SYSTOLIC BLOOD PRESSURE: 158 MMHG | WEIGHT: 192 LBS | HEART RATE: 73 BPM

## 2023-01-09 LAB
ANION GAP SERPL CALCULATED.3IONS-SCNC: 9 MMOL/L (ref 7–16)
BUN BLDV-MCNC: 19 MG/DL (ref 6–23)
CALCIUM SERPL-MCNC: 9.6 MG/DL (ref 8.6–10.2)
CHLORIDE BLD-SCNC: 102 MMOL/L (ref 98–107)
CO2: 27 MMOL/L (ref 22–29)
CREAT SERPL-MCNC: 1.3 MG/DL (ref 0.5–1)
GFR SERPL CREATININE-BSD FRML MDRD: 45 ML/MIN/1.73
GLUCOSE BLD-MCNC: 173 MG/DL (ref 74–99)
POTASSIUM SERPL-SCNC: 5 MMOL/L (ref 3.5–5)
PRO-BNP: 1061 PG/ML (ref 0–125)
SODIUM BLD-SCNC: 138 MMOL/L (ref 132–146)

## 2023-01-09 PROCEDURE — 36415 COLL VENOUS BLD VENIPUNCTURE: CPT

## 2023-01-09 PROCEDURE — 83880 ASSAY OF NATRIURETIC PEPTIDE: CPT

## 2023-01-09 PROCEDURE — 99214 OFFICE O/P EST MOD 30 MIN: CPT

## 2023-01-09 PROCEDURE — 80048 BASIC METABOLIC PNL TOTAL CA: CPT

## 2023-01-09 NOTE — PROGRESS NOTES
Congestive Heart Failure [id number]   1955          Referring Provider: Dr Kari Arredondo  Primary Care Physician: Dr Rosa Goldberg  Cardiologist: Dr Fahad Moody  Nephrologist: Dr Hunter        History of Present Illness:     Ferdinand Crandall is a 75 y.o. female with a history of HFpEF, most recent EF 55-60% 6-11-21. Patient Story:  She does have dyspnea with exertion, shortness of breath, or decline in overall functional capacity. She does not have orthopnea, PND, nocturnal cough or hemoptysis. She does not have abdominal distention or bloating, early satiety, anorexia/change in appetite. She currently is NOT on a daily loop oral diuretic, just PRN. She does not have lower extremity edema. She denies lightheadedness, dizziness. She denies palpitations, syncope or near syncope. She does not complain of chest pain, pressure, discomfort. Allergies   Allergen Reactions    Latex Hives    Bee Venom Anaphylaxis    Dilaudid [Hydromorphone Hcl] Itching    Dye [Iodides] Hives and Shortness Of Breath    Keflex [Cephalexin] Itching and Rash    Acetaminophen     Bee Pollen     Lasix [Furosemide] Other (See Comments)     Pt states she cramps up and gets headaches    Levaquin [Levofloxacin In D5w] Hives    Lipitor      MUSCLE SPASMS    Lyrica [Pregabalin]      Dream disturbances    Morphine Hives    Naproxen      Unsure of reaction;pt states able to take Aleve without difficulties    Nefazodone Other (See Comments)    Norvasc [Amlodipine] Swelling    Oxycodone-Acetaminophen Swelling    Shellfish-Derived Products     Trazodone And Nefazodone            Prior to Visit Medications    Medication Sig Taking?  Authorizing Provider   dexlansoprazole (DEXILANT) 60 MG CPDR delayed release capsule TAKE 1 CAPSULE BY MOUTH EVERY DAY  Historical Provider, MD   famotidine (PEPCID) 40 MG tablet TAKE ONE TABLET DAILY 1/2 HOUR BEFORE DINNER  Historical Provider, MD BARGER ULTRA-FINE PEN NEEDLES 29G X 12.7MM MISC USE AS DIRECTED 3 TIMES A DAY  Historical Provider, MD   SPIRIVA RESPIMAT 1.25 MCG/ACT AERS inhaler INHALE TWO (2) PUFFS BY MOUTH EVERY DAY  Historical Provider, MD   bumetanide (BUMEX) 1 MG tablet Take one tablet as needed for leg edema. Christiano Dominguez MD   carvedilol (COREG) 25 MG tablet Take 1 tablet by mouth 2 times daily (with meals)  Christiano Dominguez MD   hydrALAZINE (APRESOLINE) 50 MG tablet Take 1 tablet by mouth 3 times daily  Christiano Dominguez MD   sacubitril-valsartan (ENTRESTO) 24-26 MG per tablet Take 1 tablet by mouth 2 times daily  Christiano Dominguez MD   simvastatin (ZOCOR) 20 MG tablet Take 1 tablet by mouth nightly  Christiano Dominguez MD   isosorbide mononitrate (IMDUR) 60 MG extended release tablet Take 1 tablet by mouth daily  Christiano Dominguez MD   cetirizine (ZYRTEC) 10 MG tablet TAKE 1 TABLET BY MOUTH EVERY DAY  Aaron Diaz,    cyclobenzaprine (FLEXERIL) 5 MG tablet TAKE 1 TABLET BY MOUTH EVERY 12 HOURS FOR 2 WEEKS AS NEEDED FOR MUSCLE SPASM  Historical Provider, MD   oxybutynin (DITROPAN-XL) 10 MG extended release tablet Take 10 mg by mouth daily  Historical Provider, MD RACHEL -25 MCG/INH AEPB inhaler Inhale 1 puff into the lungs daily  Historical Provider, MD   YUPELRI 175 MCG/3ML SOLN Inhale 3 mLs into the lungs daily  Historical Provider, MD   isosorbide mononitrate (IMDUR) 60 MG extended release tablet Take 60 mg by mouth in the morning. Historical Provider, MD   fluticasone (FLONASE) 50 MCG/ACT nasal spray 2 sprays by Each Nostril route in the morning. Flex Smallwood, DO   aspirin 81 MG chewable tablet Take 1 tablet by mouth daily  Christiano Dominguez MD   nitroGLYCERIN (NITROSTAT) 0.3 MG SL tablet Place 0.3 mg under the tongue every 5 minutes as needed for Chest pain up to max of 3 total doses. If no relief after 1 dose, call 911.   Historical Provider, MD   DULoxetine (CYMBALTA) 60 MG extended release capsule Take 60 mg by mouth daily Patient only has supply to take once a day  Historical Provider, MD   gabapentin (NEURONTIN) 100 MG capsule Take 100 mg by mouth 2 times daily. Historical Provider, MD   Insulin Degludec (TRESIBA FLEXTOUCH) 200 UNIT/ML SOPN Inject 10 Units into the skin at bedtime  Historical Provider, MD   montelukast (SINGULAIR) 10 MG tablet Take 10 mg by mouth nightly  Historical Provider, MD   traZODone (DESYREL) 100 MG tablet Take 100 mg by mouth nightly  Historical Provider, MD   vitamin D (CHOLECALCIFEROL) 25 MCG (1000 UT) TABS tablet Take 1,000 Units by mouth daily  Historical Provider, MD   docusate sodium (COLACE) 100 MG capsule Take 200 mg by mouth at bedtime   Historical Provider, MD             Physical Examination:     BP (!) 158/78   Pulse 73   Resp 16   Wt 192 lb (87.1 kg)   LMP 01/01/1990   SpO2 93%   BMI 35.12 kg/m²   Assessment  Charting Type: Shift assessment    Neurological  Level of Consciousness: Alert (0)              Respiratory  Respiratory Pattern: Regular  Respiratory Quality/Effort: Dyspnea with exertion  L Breath Sounds: Clear  R Breath Sounds: Clear              Cardiac  Heart Sounds: S1, S2  Cardiac Rhythm: Sinus rhythm    Rhythm Interpretation  Heart Rate: 73         Gastrointestinal  Abdominal (WDL): Within Defined Limits  Abdomen Inspection: Rounded, Soft  RUQ Bowel Sounds: Active  LUQ Bowel Sounds: Active  RLQ Bowel Sounds: Active  LLQ Bowel Sounds: Active          Bowel Sounds  RUQ Bowel Sounds: Active  LUQ Bowel Sounds: Active  RLQ Bowel Sounds: Active  LLQ Bowel Sounds:  Active    Peripheral Vascular  Peripheral Vascular (WDL): Within Defined Limits  Edema: None                        Psychosocial  Psychosocial (WDL): Within Defined Limits                        Heart Rate: 73                     LAB DATA:    Last 3 BMP      Sodium (mmol/L)   Date Value   01/09/2023 138   10/17/2022 134   10/12/2022 135     Potassium (mmol/L)   Date Value   01/09/2023 5.0   09/22/2022 4.7 Potassium reflex Magnesium (mmol/L)   Date Value   10/17/2022 4.8   10/12/2022 4.8   08/26/2022 4.7     Chloride (mmol/L)   Date Value   01/09/2023 102   10/17/2022 99   10/12/2022 101     CO2 (mmol/L)   Date Value   01/09/2023 27   10/17/2022 23   10/12/2022 27     BUN (mg/dL)   Date Value   01/09/2023 19   10/17/2022 24 (H)   10/12/2022 22     Glucose (mg/dL)   Date Value   01/09/2023 173 (H)   10/17/2022 108 (H)   10/12/2022 103 (H)   12/01/2011 181 (H)   10/05/2011 133 (H)   08/21/2011 94     Calcium (mg/dL)   Date Value   01/09/2023 9.6   10/17/2022 9.5   10/12/2022 9.2       Last 3 BNP       Pro-BNP (pg/mL)   Date Value   01/09/2023 1,061 (H)   10/17/2022 907 (H)   09/22/2022 575 (H)          CBC: No results for input(s): WBC, HGB, PLT in the last 72 hours. BMP:    Recent Labs     01/09/23  0929      K 5.0      CO2 27   BUN 19   CREATININE 1.3*   GLUCOSE 173*         Hepatic: No results for input(s): AST, ALT, ALB, BILITOT, ALKPHOS in the last 72 hours. Troponin: No results for input(s): TROPONINI in the last 72 hours. BNP: No results for input(s): BNP in the last 72 hours. Lipids: No results for input(s): CHOL, HDL in the last 72 hours. Invalid input(s): LDLCALCU  INR: No results for input(s): INR in the last 72 hours. WEIGHTS:    Wt Readings from Last 3 Encounters:   01/09/23 192 lb (87.1 kg)   12/13/22 196 lb (88.9 kg)   10/17/22 204 lb (92.5 kg)         TELEMETRY:  Cardiac Regularity: Regular  Cardiac Rhythm/Interpretation: NSR        ASSESSMENT:  Denis Mayfield is euvolemic with stable weights. She is no longer on a daily loop diuretic since seeing Dr. Robert Peterson where it was changed to prn only for worsening signs and symptoms of CHF. She reports she only needed one prn dose of bumetanide in past week.     Interventions completed this visit:  IV diuretics given-no  Lab work obtained yes, BMP/proBNP   Reviewed currently prescribed medications with patient, educated on importance of compliance and answered any questions regarding their medication  Educated on signs and symptoms of HF  Educated on low sodium diet    PLAN:  Scheduled to follow up in CHF clinic on   Future Appointments   Date Time Provider Department Center   1/31/2023 10:30 AM DO Joe Hodges [facility]   3/6/2023 10:00 AM [facility]   7/6/2023 10:30 AM Pino Hernandez MD [facility]     Given clinic phone number [phone] and aware of signs and symptoms to call with any HF change in symptoms.

## 2023-01-10 ENCOUNTER — HOSPITAL ENCOUNTER (EMERGENCY)
Age: 68
Discharge: HOME | End: 2023-01-10
Attending: EMERGENCY MEDICINE
Payer: MEDICARE

## 2023-01-10 ENCOUNTER — APPOINTMENT (OUTPATIENT)
Dept: GENERAL RADIOLOGY | Age: 68
End: 2023-01-10
Payer: MEDICARE

## 2023-01-10 ENCOUNTER — APPOINTMENT (OUTPATIENT)
Dept: CT IMAGING | Age: 68
End: 2023-01-10
Payer: MEDICARE

## 2023-01-10 VITALS
OXYGEN SATURATION: 99 % | TEMPERATURE: 97 F | WEIGHT: 192 LBS | DIASTOLIC BLOOD PRESSURE: 76 MMHG | RESPIRATION RATE: 16 BRPM | HEIGHT: 62 IN | SYSTOLIC BLOOD PRESSURE: 142 MMHG | BODY MASS INDEX: 35.33 KG/M2 | HEART RATE: 68 BPM

## 2023-01-10 DIAGNOSIS — I10 ESSENTIAL HYPERTENSION: ICD-10-CM

## 2023-01-10 DIAGNOSIS — R51.9 NONINTRACTABLE HEADACHE, UNSPECIFIED CHRONICITY PATTERN, UNSPECIFIED HEADACHE TYPE: ICD-10-CM

## 2023-01-10 DIAGNOSIS — R07.9 CHEST PAIN, UNSPECIFIED TYPE: Primary | ICD-10-CM

## 2023-01-10 LAB
ALBUMIN SERPL-MCNC: 3.5 G/DL (ref 3.5–5.2)
ALP BLD-CCNC: 82 U/L (ref 35–104)
ALT SERPL-CCNC: 13 U/L (ref 0–32)
ANION GAP SERPL CALCULATED.3IONS-SCNC: 9 MMOL/L (ref 7–16)
AST SERPL-CCNC: 12 U/L (ref 0–31)
BACTERIA: ABNORMAL /HPF
BASOPHILS ABSOLUTE: 0.02 E9/L (ref 0–0.2)
BASOPHILS RELATIVE PERCENT: 0.3 % (ref 0–2)
BILIRUB SERPL-MCNC: 0.3 MG/DL (ref 0–1.2)
BILIRUBIN URINE: NEGATIVE
BLOOD, URINE: NEGATIVE
BUN BLDV-MCNC: 17 MG/DL (ref 6–23)
CALCIUM SERPL-MCNC: 9 MG/DL (ref 8.6–10.2)
CHLORIDE BLD-SCNC: 102 MMOL/L (ref 98–107)
CLARITY: CLEAR
CO2: 26 MMOL/L (ref 22–29)
COLOR: YELLOW
CREAT SERPL-MCNC: 1.1 MG/DL (ref 0.5–1)
CRYSTALS, UA: ABNORMAL /HPF
D DIMER: 333 NG/ML DDU
EKG ATRIAL RATE: 75 BPM
EKG P AXIS: 68 DEGREES
EKG P-R INTERVAL: 190 MS
EKG Q-T INTERVAL: 450 MS
EKG QRS DURATION: 146 MS
EKG QTC CALCULATION (BAZETT): 502 MS
EKG R AXIS: 37 DEGREES
EKG T AXIS: 21 DEGREES
EKG VENTRICULAR RATE: 75 BPM
EOSINOPHILS ABSOLUTE: 0.11 E9/L (ref 0.05–0.5)
EOSINOPHILS RELATIVE PERCENT: 1.4 % (ref 0–6)
GFR SERPL CREATININE-BSD FRML MDRD: 55 ML/MIN/1.73
GLUCOSE BLD-MCNC: 140 MG/DL (ref 74–99)
GLUCOSE URINE: NEGATIVE MG/DL
HCT VFR BLD CALC: 41.1 % (ref 34–48)
HEMOGLOBIN: 13.6 G/DL (ref 11.5–15.5)
IMMATURE GRANULOCYTES #: 0.03 E9/L
IMMATURE GRANULOCYTES %: 0.4 % (ref 0–5)
KETONES, URINE: NEGATIVE MG/DL
LACTIC ACID: 1.4 MMOL/L (ref 0.5–2.2)
LEUKOCYTE ESTERASE, URINE: NEGATIVE
LYMPHOCYTES ABSOLUTE: 1.12 E9/L (ref 1.5–4)
LYMPHOCYTES RELATIVE PERCENT: 14.6 % (ref 20–42)
MAGNESIUM: 1.7 MG/DL (ref 1.6–2.6)
MCH RBC QN AUTO: 32 PG (ref 26–35)
MCHC RBC AUTO-ENTMCNC: 33.1 % (ref 32–34.5)
MCV RBC AUTO: 96.7 FL (ref 80–99.9)
MONOCYTES ABSOLUTE: 0.3 E9/L (ref 0.1–0.95)
MONOCYTES RELATIVE PERCENT: 3.9 % (ref 2–12)
NEUTROPHILS ABSOLUTE: 6.09 E9/L (ref 1.8–7.3)
NEUTROPHILS RELATIVE PERCENT: 79.4 % (ref 43–80)
NITRITE, URINE: NEGATIVE
PDW BLD-RTO: 12.4 FL (ref 11.5–15)
PH UA: 6 (ref 5–9)
PLATELET # BLD: 249 E9/L (ref 130–450)
PMV BLD AUTO: 10.6 FL (ref 7–12)
POTASSIUM SERPL-SCNC: 4.2 MMOL/L (ref 3.5–5)
PRO-BNP: 1173 PG/ML (ref 0–125)
PROTEIN UA: 30 MG/DL
RBC # BLD: 4.25 E12/L (ref 3.5–5.5)
RBC UA: ABNORMAL /HPF (ref 0–2)
SODIUM BLD-SCNC: 137 MMOL/L (ref 132–146)
SPECIFIC GRAVITY UA: 1.01 (ref 1–1.03)
TOTAL PROTEIN: 6.7 G/DL (ref 6.4–8.3)
TROPONIN, HIGH SENSITIVITY: 12 NG/L (ref 0–9)
TROPONIN, HIGH SENSITIVITY: 13 NG/L (ref 0–9)
UROBILINOGEN, URINE: 0.2 E.U./DL
WBC # BLD: 7.7 E9/L (ref 4.5–11.5)
WBC UA: ABNORMAL /HPF (ref 0–5)

## 2023-01-10 PROCEDURE — 70450 CT HEAD/BRAIN W/O DYE: CPT

## 2023-01-10 PROCEDURE — 83605 ASSAY OF LACTIC ACID: CPT

## 2023-01-10 PROCEDURE — 71275 CT ANGIOGRAPHY CHEST: CPT

## 2023-01-10 PROCEDURE — 6370000000 HC RX 637 (ALT 250 FOR IP)

## 2023-01-10 PROCEDURE — 85025 COMPLETE CBC W/AUTO DIFF WBC: CPT

## 2023-01-10 PROCEDURE — 83735 ASSAY OF MAGNESIUM: CPT

## 2023-01-10 PROCEDURE — 84484 ASSAY OF TROPONIN QUANT: CPT

## 2023-01-10 PROCEDURE — 93005 ELECTROCARDIOGRAM TRACING: CPT | Performed by: PHYSICIAN ASSISTANT

## 2023-01-10 PROCEDURE — 83880 ASSAY OF NATRIURETIC PEPTIDE: CPT

## 2023-01-10 PROCEDURE — 96375 TX/PRO/DX INJ NEW DRUG ADDON: CPT

## 2023-01-10 PROCEDURE — 93010 ELECTROCARDIOGRAM REPORT: CPT | Performed by: INTERNAL MEDICINE

## 2023-01-10 PROCEDURE — 6360000002 HC RX W HCPCS

## 2023-01-10 PROCEDURE — 85378 FIBRIN DEGRADE SEMIQUANT: CPT

## 2023-01-10 PROCEDURE — 71046 X-RAY EXAM CHEST 2 VIEWS: CPT

## 2023-01-10 PROCEDURE — 81001 URINALYSIS AUTO W/SCOPE: CPT

## 2023-01-10 PROCEDURE — 96374 THER/PROPH/DIAG INJ IV PUSH: CPT

## 2023-01-10 PROCEDURE — 6360000004 HC RX CONTRAST MEDICATION: Performed by: RADIOLOGY

## 2023-01-10 PROCEDURE — 99285 EMERGENCY DEPT VISIT HI MDM: CPT

## 2023-01-10 PROCEDURE — 80053 COMPREHEN METABOLIC PANEL: CPT

## 2023-01-10 RX ORDER — NITROGLYCERIN 0.4 MG/1
0.4 TABLET SUBLINGUAL ONCE
Status: COMPLETED | OUTPATIENT
Start: 2023-01-10 | End: 2023-01-10

## 2023-01-10 RX ORDER — KETOROLAC TROMETHAMINE 30 MG/ML
15 INJECTION, SOLUTION INTRAMUSCULAR; INTRAVENOUS ONCE
Status: COMPLETED | OUTPATIENT
Start: 2023-01-10 | End: 2023-01-10

## 2023-01-10 RX ORDER — DIPHENHYDRAMINE HYDROCHLORIDE 50 MG/ML
50 INJECTION INTRAMUSCULAR; INTRAVENOUS ONCE
Status: COMPLETED | OUTPATIENT
Start: 2023-01-10 | End: 2023-01-10

## 2023-01-10 RX ORDER — METHYLPREDNISOLONE SODIUM SUCCINATE 125 MG/2ML
125 INJECTION, POWDER, LYOPHILIZED, FOR SOLUTION INTRAMUSCULAR; INTRAVENOUS ONCE
Status: COMPLETED | OUTPATIENT
Start: 2023-01-10 | End: 2023-01-10

## 2023-01-10 RX ORDER — KETOROLAC TROMETHAMINE 30 MG/ML
15 INJECTION, SOLUTION INTRAMUSCULAR; INTRAVENOUS ONCE
Status: DISCONTINUED | OUTPATIENT
Start: 2023-01-10 | End: 2023-01-10

## 2023-01-10 RX ADMIN — IOPAMIDOL 75 ML: 755 INJECTION, SOLUTION INTRAVENOUS at 19:39

## 2023-01-10 RX ADMIN — METHYLPREDNISOLONE SODIUM SUCCINATE 125 MG: 125 INJECTION, POWDER, FOR SOLUTION INTRAMUSCULAR; INTRAVENOUS at 19:09

## 2023-01-10 RX ADMIN — DIPHENHYDRAMINE HYDROCHLORIDE 50 MG: 50 INJECTION, SOLUTION INTRAMUSCULAR; INTRAVENOUS at 19:07

## 2023-01-10 RX ADMIN — KETOROLAC TROMETHAMINE 15 MG: 30 INJECTION, SOLUTION INTRAMUSCULAR; INTRAVENOUS at 20:41

## 2023-01-10 RX ADMIN — NITROGLYCERIN 0.4 MG: 0.4 TABLET SUBLINGUAL at 17:57

## 2023-01-10 ASSESSMENT — PAIN SCALES - GENERAL
PAINLEVEL_OUTOF10: 7
PAINLEVEL_OUTOF10: 8

## 2023-01-10 ASSESSMENT — PAIN - FUNCTIONAL ASSESSMENT: PAIN_FUNCTIONAL_ASSESSMENT: 0-10

## 2023-01-10 NOTE — ED NOTES
Radiology Procedure Waiver   Name: Paz Benjamin  : 1955  MRN: [id number]    Date:  1/10/23    Time: 6:28 PM EST    Benefits of immediately proceeding with Radiology exam(s) without pre-testing outweigh the risks or are not indicated as specified below and therefore the following is/are being waived:    [] Pregnancy test   [] Patients LMP on-time and regular.   [] Patient had Tubal Ligation or has other Contraception Device. [] Patient  is Menopausal or Premenarcheal.    [] Patient had Full or Partial Hysterectomy. [x] Protocol for Iodine allergy                Pretreating with Benadryl and Solumedrol  [] MRI Questionnaire     [] BUN/Creatinine   [] Patient age w/no hx of renal dysfunction. [] Patient on Dialysis. [] Recent Normal Labs.   Electronically signed by Rosie Fox DO on 1/10/23 at 6:28 PM EST               Rosie Fox DO  Resident  01/10/23 [id number]

## 2023-01-10 NOTE — ED PROVIDER NOTES
[address]        Pt Name: Miah Ocampo  MRN: [id number]  [place] 1955  Date of evaluation: 1/10/2023  Provider: Lana Rothman DO  PCP: Eber Pearson DO  Note Started: 5:03 PM EST 1/10/23    CHIEF COMPLAINT       Chief Complaint   Patient presents with    Chest Pain     Chest pain onset today, heaviness    Hypertension    Shortness of Breath     Hx COPD and asthma, 94% on RA       HISTORY OF PRESENT ILLNESS: 1 or more Elements   History From: Patient    Limitations to history : None    Miah Ocampo is a 75 y.o. female who presents to the emergency department for hypertension that started this morning. Complaints been constant, moderate severity, nothing makes better or worse. Patient has a history of open heart surgery with 2 stents placed 2011 and sees Dr. Jarett Moore for nephrology. Patient reports that her blood pressure was 329 systolic at home, took her carvedilol and hydralazine today around 3 PM but no improvement. She called her family doctor, and Dr. Kenia Rae nephrology's office they both told her to come to the emergency department for evaluation. Patient is complaining of headache that starts in the front and radiates to her neck, blurry vision, and some chest pressure that does not radiate. Patient denies fever, chills, abdominal pain, nausea, vomiting, diarrhea, lightheadedness, dysuria, hematuria, hematochezia, and melena. Nursing Notes were all reviewed and agreed with or any disagreements were addressed in the HPI. REVIEW OF SYSTEMS :           Positives and Pertinent negatives as per HPI.      SURGICAL HISTORY     Past Surgical History:   Procedure Laterality Date    ANGIOPLASTY  11/13/2018    Dr. Oli Loera & athrectomy L SFA & Popliteal    BREAST SURGERY  2000    bilateral reduction    BRONCHIAL BRUSH BIOPSY  1/25/2013    Dr Jarad Escudero  04/20/2015     [facility]  12/2011     DR. Tres Canela,  follows Dr Cortés Slider  11/15/2013    Dr Hanny Rocha    COLONOSCOPY  12/23/2016    Dr Claudell Borer adenoma & hyperplastic polyps, melanosis coli (repeat one year 12/2017)    CORONARY ARTERY BYPASS GRAFT      ECHO COMPLETE  10/9/2013         ENDOSCOPY, COLON, DIAGNOSTIC  04/18/2017    GALLBLADDER SURGERY      gallstones removed, [facility] 8/2017    HYSTERECTOMY (CERVIX STATUS UNKNOWN)      JOINT REPLACEMENT  2011    LEFT KNEE    KNEE SURGERY      left knee replacement    LARYNGOSCOPY N/A 4/4/2022    DIRECT LARYNGOSCOPY WITH BIOPSY performed by Sarah Diaz DO at . [address] Left [id number]    LIVER BIOPSY  1/8/2016    [facility]    POLYSOMNOGRAPHY  1/2016    [facility]    UPPER GASTROINTESTINAL ENDOSCOPY  12/20/12    UPPER GASTROINTESTINAL ENDOSCOPY  12/23/2016    Dr Hanny Rocha    UPPER GASTROINTESTINAL ENDOSCOPY  02/08/2017       CURRENTMEDICATIONS       Previous Medications    ASPIRIN 81 MG CHEWABLE TABLET    Take 1 tablet by mouth daily    BD ULTRA-FINE PEN NEEDLES 29G X 12.7MM MISC    USE AS DIRECTED 3 TIMES A DAY    BREO ELLIPTA 100-25 MCG/INH AEPB INHALER    Inhale 1 puff into the lungs daily    BUMETANIDE (BUMEX) 1 MG TABLET    Take one tablet as needed for leg edema.     CARVEDILOL (COREG) 25 MG TABLET    Take 1 tablet by mouth 2 times daily (with meals)    CETIRIZINE (ZYRTEC) 10 MG TABLET    TAKE 1 TABLET BY MOUTH EVERY DAY    CYCLOBENZAPRINE (FLEXERIL) 5 MG TABLET    TAKE 1 TABLET BY MOUTH EVERY 12 HOURS FOR 2 WEEKS AS NEEDED FOR MUSCLE SPASM    DEXLANSOPRAZOLE (DEXILANT) 60 MG CPDR DELAYED RELEASE CAPSULE    TAKE 1 CAPSULE BY MOUTH EVERY DAY    DOCUSATE SODIUM (COLACE) 100 MG CAPSULE    Take 200 mg by mouth at bedtime     DULOXETINE (CYMBALTA) 60 MG EXTENDED RELEASE CAPSULE    Take 60 mg by mouth daily Patient only has supply to take once a day    FAMOTIDINE (PEPCID) 40 MG TABLET    TAKE ONE TABLET DAILY 1/2 HOUR BEFORE DINNER    FLUTICASONE (FLONASE) 50 MCG/ACT NASAL SPRAY    2 sprays by Each Nostril route in the morning. GABAPENTIN (NEURONTIN) 100 MG CAPSULE    Take 100 mg by mouth 2 times daily. HYDRALAZINE (APRESOLINE) 50 MG TABLET    Take 1 tablet by mouth 3 times daily    INSULIN DEGLUDEC (TRESIBA FLEXTOUCH) 200 UNIT/ML SOPN    Inject 10 Units into the skin at bedtime    ISOSORBIDE MONONITRATE (IMDUR) 60 MG EXTENDED RELEASE TABLET    Take 60 mg by mouth in the morning. ISOSORBIDE MONONITRATE (IMDUR) 60 MG EXTENDED RELEASE TABLET    Take 1 tablet by mouth daily    MONTELUKAST (SINGULAIR) 10 MG TABLET    Take 10 mg by mouth nightly    NITROGLYCERIN (NITROSTAT) 0.3 MG SL TABLET    Place 0.3 mg under the tongue every 5 minutes as needed for Chest pain up to max of 3 total doses. If no relief after 1 dose, call 911.     OXYBUTYNIN (DITROPAN-XL) 10 MG EXTENDED RELEASE TABLET    Take 10 mg by mouth daily    SACUBITRIL-VALSARTAN (ENTRESTO) 24-26 MG PER TABLET    Take 1 tablet by mouth 2 times daily    SIMVASTATIN (ZOCOR) 20 MG TABLET    Take 1 tablet by mouth nightly    SPIRIVA RESPIMAT 1.25 MCG/ACT AERS INHALER    INHALE TWO (2) PUFFS BY MOUTH EVERY DAY    TRAZODONE (DESYREL) 100 MG TABLET    Take 100 mg by mouth nightly    VITAMIN D (CHOLECALCIFEROL) 25 MCG (1000 UT) TABS TABLET    Take 1,000 Units by mouth daily    YUPELRI 175 MCG/3ML SOLN    Inhale 3 mLs into the lungs daily       ALLERGIES     Latex, Bee venom, Dilaudid [hydromorphone hcl], Dye [iodides], Keflex [cephalexin], Acetaminophen, Bee pollen, Lasix [furosemide], Levaquin [levofloxacin in d5w], Lipitor, Lyrica [pregabalin], Morphine, Naproxen, Nefazodone, Norvasc [amlodipine], Oxycodone-acetaminophen, Shellfish-derived products, and Trazodone and nefazodone    FAMILYHISTORY       Family History   Problem Relation Age of Onset    Cancer Mother     Asthma Father         SOCIAL HISTORY       Social History     Tobacco Use    Smoking status: Every Day     Packs/day: 0.25     Years: 40.00     Pack years: 10.00     Types: Cigarettes     Start date: 8/10/1974    Smokeless tobacco: Never    Tobacco comments:     2 cigs daily   Vaping Use    Vaping Use: Never used   Substance Use Topics    Alcohol use: Not Currently     Comment: social    Drug use: Yes     Types: Marijuana Deadra Travis)     Comment: \"every 4th or 5th day out of the week\"       SCREENINGS        [place] Coma Scale  Eye Opening: Spontaneous  Best Verbal Response: Oriented  Best Motor Response: Obeys commands  [place] Coma Scale Score: 15                CIWA Assessment  BP: (!) 149/79  Heart Rate: 65           PHYSICAL EXAM  1 or more Elements     ED Triage Vitals [01/10/23 1532]   BP Temp Temp Source Heart Rate Resp SpO2 Height Weight   (!) 203/92 97 °F (36.1 °C) Temporal 77 16 97 % 5' 2\" (1.575 m) 192 lb (87.1 kg)              Constitutional/General: Alert and oriented x3  Head: Normocephalic and atraumatic  Eyes: PERRL, EOMI, conjunctiva normal, sclera non icteric  ENT:  Oropharynx clear, handling secretions, no trismus, no asymmetry of the posterior oropharynx or uvular edema  Neck: Supple, full ROM, no stridor, no meningeal signs  Respiratory: Lungs clear to auscultation bilaterally, no wheezes, rales, or rhonchi. Not in respiratory distress  Cardiovascular:  Regular rate. Regular rhythm. No murmurs, no gallops, no rubs. 2+ distal pulses. Equal extremity pulses. Chest: Reproducible chest pain with palpation to the right chest wall. GI:  Abdomen Soft, Non tender, Non distended. +BS. No rebound, guarding, or rigidity. No pulsatile masses. Musculoskeletal: Moves all extremities x 4. Warm and well perfused, no clubbing, no cyanosis, no edema. Capillary refill <3 seconds  Integument: skin warm and dry. No rashes.    Neurologic: GCS 15, no focal deficits, symmetric strength 5/5 in the upper and lower extremities bilaterally  Psychiatric: Normal Affect            DIAGNOSTIC RESULTS   LABS:    Labs Reviewed   COMPREHENSIVE METABOLIC PANEL - Abnormal; Notable for the following components:       Result Value    Glucose 140 (*)     Creatinine 1.1 (*)     All other components within normal limits   CBC WITH AUTO DIFFERENTIAL - Abnormal; Notable for the following components:    Lymphocytes % 14.6 (*)     Lymphocytes Absolute 1.12 (*)     All other components within normal limits   TROPONIN - Abnormal; Notable for the following components:    Troponin, High Sensitivity 12 (*)     All other components within normal limits   BRAIN NATRIURETIC PEPTIDE - Abnormal; Notable for the following components:    Pro-BNP 1,173 (*)     All other components within normal limits   URINALYSIS WITH MICROSCOPIC - Abnormal; Notable for the following components:    Protein, UA 30 (*)     Crystals, UA Many (*)     All other components within normal limits   TROPONIN - Abnormal; Notable for the following components:    Troponin, High Sensitivity 13 (*)     All other components within normal limits   MAGNESIUM   LACTIC ACID   D-DIMER, QUANTITATIVE       As interpreted by me, the above displayed labs are abnormal. All other labs obtained during this visit were within normal range or not returned as of this dictation. EKG Interpretation  Interpreted by emergency department physician, Joss Lopez DO      EKG: This EKG is signed and interpreted by me. Rate: 75  Rhythm: Sinus  Interpretation: Normal axis. Right bundle branch block. No ST changes, no STEMI. QTc 502. Comparison: stable as compared to patient's most recent EKG          RADIOLOGY:   Non-plain film images such as CT, Ultrasound and MRI are read by the radiologist. Plain radiographic images are visualized and preliminarily interpreted by the ED Provider with the below findings:    Chest x-ray shows no evidence of widening to the mediastinum, evidence of pleural effusion or consolidation. No pleural effusions.   CT of head shows no evidence of intracranial hemorrhage  CTA shows no evidence of large pulmonary embolism. Interpretation per the Radiologist below, if available at the time of this note:    CTA PULMONARY W CONTRAST   Final Result   1. No pulmonary embolism. 2. Cardiomegaly with pulmonary vascular congestion. 3. Ground-glass and irregular opacities are present in the lung bases which   appear similar compared to prior which could indicate persistent pneumonia   with areas of subsegmental atelectasis or scarring. CT HEAD WO CONTRAST   Final Result   No acute intracranial abnormality. XR CHEST (2 VW)   Final Result   Cardiomegaly with pulmonary vascular congestion. No focal pneumonia or   evidence of pleural effusion. XR CHEST (2 VW)    Result Date: 1/10/2023  EXAMINATION: TWO XRAY VIEWS OF THE CHEST 1/10/2023 4:26 pm COMPARISON: October 17, 2022 HISTORY: ORDERING SYSTEM PROVIDED HISTORY: SOB known COPD, CP TECHNOLOGIST PROVIDED HISTORY: Reason for exam:->SOB known COPD, CP FINDINGS: Median sternotomy wires are present. Mild cardiomegaly. There is prominence of pulmonary vasculature. No airspace opacity or evidence of pleural effusion. No pneumothorax. Cardiomegaly with pulmonary vascular congestion. No focal pneumonia or evidence of pleural effusion. No results found.     PROCEDURES   Unless otherwise noted below, none          CRITICAL CARE TIME (.cct)   none    PAST MEDICAL HISTORY/Chronic Conditions Affecting Care      has a past medical history of Asthma, Atherosclerosis of native artery of left leg with rest pain ([address] (11/09/2018), CAD (coronary artery disease) (2011), Cellulitis and abscess of trunk (04/14/2015), Cerebellar infarct ([address] (04/03/2019), Chronic back pain, Chronic kidney disease, Chronic systolic congestive heart failure ([address] (10/04/2013), Chronic venous insufficiency (10/11/2017), COPD (chronic obstructive pulmonary disease) ([address] COVID-19, Depression, Diabetes mellitus ([address] Diabetic neuropathy ([facility] Diverticulosis, Fatty liver (01/08/2016), Glaucoma, open angle (02/01/2016), Hepatic encephalopathy ([facility] (02/07/2016), Hiatal hernia, History of blood transfusion, Hyperlipidemia, Hyperplastic colon polyp, Hypertension, Incontinence, Liver mass, Morbid obesity ([facility] Movement disorder, Myocardial infarction ([facility] (2011), O2 dependent (09/16/2021), Osteoarthritis, generalized, Pain in both lower legs (10/11/2017), Peripheral vascular disease ([facility] Pneumonia (01/26/2014), Pulmonary edema, PVD (peripheral vascular disease) with claudication ([facility] (08/30/2017), Sleep apnea, Status post peripheral artery angioplasty (10/31/2019), Tobacco abuse, Tobacco abuse (10/11/2017), Tobacco dependence (6/30/2022), Tubular adenoma polyp of rectum, Urinary tract infection due to ESBL Klebsiella (03/08/2017), and Ventral hernia.      EMERGENCY DEPARTMENT COURSE    Vitals:    Vitals:    01/10/23 1727 01/10/23 1829 01/10/23 1835 01/10/23 1948   BP: (!) 202/93 (!) 185/94 (!) 149/79    Pulse: 72 73 65 65   Resp: 16 16 16    Temp:       TempSrc:       SpO2: 98%  99%    Weight:       Height:           Patient was given the following medications:  Medications   ketorolac (TORADOL) injection 15 mg (has no administration in time range)   nitroGLYCERIN (NITROSTAT) SL tablet 0.4 mg (0.4 mg SubLINGual Given 1/10/23 1757)   diphenhydrAMINE (BENADRYL) injection 50 mg (50 mg IntraVENous Given 1/10/23 1907)   methylPREDNISolone sodium (SOLU-MEDROL) injection 125 mg (125 mg IntraVENous Given 1/10/23 1909)   iopamidol (ISOVUE-370) 76 % injection 75 mL (75 mLs IntraVENous Given 1/10/23 1939)             Medical Decision Making/Differential Diagnosis:    CC/HPI Summary, Social Determinants of health, Records Reviewed, DDx, testing done/not done, ED Course, Reassessment, disposition considerations/shared decision making with patient, consults, disposition:      Patient presented the emergency department for hypertension, headache, and chest pain that is localized to the right side. On physical exam, patient has no focal neurological deficits and the chest pain is reproducible to her right chest with palpation. Differential diagnosis includes intracranial hemorrhage, dissection, pulmonary embolism, and ACS. EKG was stable in comparison to previous. Troponins were stable in emergency department. D-dimer was elevated, ordered CTA of the chest which shows no evidence of pulmonary embolism. Chest x-ray showed no evidence of widening to the mediastinum is a differential diagnosis of dissection is unlikely. Patient was administered nitro with no improvement to her pain. On reevaluation, discussed today's results with shared decision making patient elects to be discharged to follow-up with her doctors outpatient. She still experiencing some pain so prior to discharge she will be administered Toradol and will follow up with her PCP, cardiologist, and nephrologist.  Patient's blood pressure improved after receiving nitroglycerin, is 149/79. ED Course as of 01/10/23 2042   Tue Garret 10, 2023   1724 EKG shows normal sinus rhythm at 75 beats minute no signs of any ST changes no ST elevation . No significant change from prior EKG. Chronic T wave inversions in inferior leads and lateral leads. EKG inter by myself. [KK]      ED Course User Index  [KK] Enmanuel Saenz MD              CONSULTS: (Who and What was discussed)  None        I am the Primary Clinician of Record. FINAL IMPRESSION      1. Chest pain, unspecified type    2. Essential hypertension    3.  Nonintractable headache, unspecified chronicity pattern, unspecified headache type          DISPOSITION/PLAN     DISPOSITION Decision To Discharge 01/10/2023 08:28:15 PM      PATIENT REFERRED TO:  Jena Chahal DO  [address]  [place] [address] [facility] [phone]    Call in 1 day  for follow up    Emir Nicholas MD  [address] [phone]    Call in 1 day  for follow up    Marylin Loera [address] [facility]  [place] [phone]    Call in 1 day  for follow up    [address]:  New Prescriptions    No medications on file       DISCONTINUED MEDICATIONS:  Discontinued Medications    No medications on file              (Please note that portions of this note were completed with a voice recognition program.  Efforts were made to edit the dictations but occasionally words are mis-transcribed.)    Sandra Alvares DO (electronically signed)          Sandra Alvares DO  Resident  01/10/23 8074

## 2023-01-10 NOTE — ED NOTES
Department of Emergency Medicine  FIRST PROVIDER TRIAGE NOTE             Independent MLP           1/10/23  3:35 PM EST    Date of Encounter: 1/10/23   MRN: [id number]      HPI: Yumi Fong is a 75 y.o. female who presents to the ED for Chest Pain (Chest pain onset today, heaviness), Hypertension, and Shortness of Breath (Hx COPD and asthma, 94% on RA)     Patient is a 70-year-old presenting with chest pain and chest heaviness for the last 2 days. Patient states her blood pressures also been elevated. Patient states she has known COPD and asthma. Patient does not wear oxygen at home and is 94% on room air    ROS: Negative for abd pain, back pain, or fever. PE: Gen Appearance/Constitutional: alert  HEENT: NC/NT. PERRLA,  Airway patent. Neck: supple     Initial Plan of Care: All treatment areas with department are currently occupied. Plan to order/Initiate the following while awaiting opening in ED: labs, EKG, and imaging studies.   Initiate Treatment-Testing, Proceed toTreatment Area When Bed Available for ED Attending/MLP to Continue Care    Electronically signed by Asael Jean Baptiste PA-C   DD: 1/10/23       Asael Jean Baptiste PA-C  01/10/23 0817

## 2023-01-20 RX ORDER — CARVEDILOL 25 MG/1
TABLET ORAL
Qty: 60 TABLET | Refills: 11 | Status: SHIPPED | OUTPATIENT
Start: 2023-01-20

## 2023-01-20 RX ORDER — BUMETANIDE 1 MG/1
TABLET ORAL
Qty: 30 TABLET | Refills: 11 | Status: SHIPPED | OUTPATIENT
Start: 2023-01-20

## 2023-02-27 ENCOUNTER — TELEPHONE (OUTPATIENT)
Dept: CARDIOLOGY CLINIC | Age: 68
End: 2023-02-27

## 2023-02-27 DIAGNOSIS — I10 ESSENTIAL HYPERTENSION: Primary | ICD-10-CM

## 2023-02-27 RX ORDER — SACUBITRIL AND VALSARTAN 49; 51 MG/1; MG/1
1 TABLET, FILM COATED ORAL 2 TIMES DAILY
COMMUNITY
End: 2023-02-27 | Stop reason: SDUPTHER

## 2023-02-27 RX ORDER — SACUBITRIL AND VALSARTAN 49; 51 MG/1; MG/1
1 TABLET, FILM COATED ORAL 2 TIMES DAILY
Qty: 60 TABLET | Refills: 11 | Status: SHIPPED | OUTPATIENT
Start: 2023-02-27

## 2023-02-27 NOTE — TELEPHONE ENCOUNTER
Increase Entresto to 49/51 mg p.o. twice daily please ask her to stay on Imdur 60 mg p.o. daily (does not work well for the blood pressure). Monitor blood pressure and check BMP in 1 week call us with the blood pressure readings in 1 week.

## 2023-02-27 NOTE — TELEPHONE ENCOUNTER
Patient called stating her BP has been high. She took it upon herself to increase her Imdur to 150 mg daily approximately a week and a half ago. 192/100  195/107  159/89 (66) - most recent today    Please advise.

## 2023-02-27 NOTE — TELEPHONE ENCOUNTER
Patient notified of Dr. Boateng's recommendations. Med list amended. Entresto 49-51 mg e-scribed. Order placed for BMP.

## 2023-03-06 ENCOUNTER — HOSPITAL ENCOUNTER (OUTPATIENT)
Dept: OTHER | Age: 68
Setting detail: THERAPIES SERIES
Discharge: HOME | End: 2023-03-06

## 2023-03-10 ENCOUNTER — TELEPHONE (OUTPATIENT)
Dept: CARDIOLOGY CLINIC | Age: 68
End: 2023-03-10

## 2023-03-10 NOTE — TELEPHONE ENCOUNTER
BP Readings:    2/28-   157/70  72  3/1       188/91  79  3/2       129/70  72  3/3       141/76  73  3/4       139/78  72  3/5       161/81  71  3/6       135/75  71  3/7       175/85  75  3/8       132/69  73  3/9       157/81  70  3/10     144/76  74

## 2023-03-13 ENCOUNTER — TELEPHONE (OUTPATIENT)
Dept: CARDIOLOGY CLINIC | Age: 68
End: 2023-03-13

## 2023-03-13 NOTE — TELEPHONE ENCOUNTER
Her blood pressures are elevated. Increase Entresto to 97/103 mg p.o. twice daily check BMP in 1 week. Monitor blood pressure daily and call me in 1 week.

## 2023-03-13 NOTE — TELEPHONE ENCOUNTER
Patient states she is taking hydralazine 100mg TID and would like a refill. Chart states hydralazine 50mg TID.  Please clarify

## 2023-03-14 ENCOUNTER — APPOINTMENT (OUTPATIENT)
Dept: GENERAL RADIOLOGY | Age: 68
End: 2023-03-14
Payer: MEDICARE

## 2023-03-14 ENCOUNTER — HOSPITAL ENCOUNTER (EMERGENCY)
Age: 68
Discharge: HOME | End: 2023-03-14
Attending: EMERGENCY MEDICINE
Payer: MEDICARE

## 2023-03-14 ENCOUNTER — APPOINTMENT (OUTPATIENT)
Dept: CT IMAGING | Age: 68
End: 2023-03-14
Payer: MEDICARE

## 2023-03-14 VITALS
BODY MASS INDEX: 34.96 KG/M2 | OXYGEN SATURATION: 96 % | DIASTOLIC BLOOD PRESSURE: 79 MMHG | HEART RATE: 68 BPM | HEIGHT: 62 IN | TEMPERATURE: 97.2 F | RESPIRATION RATE: 16 BRPM | SYSTOLIC BLOOD PRESSURE: 170 MMHG | WEIGHT: 190 LBS

## 2023-03-14 DIAGNOSIS — R51.9 ACUTE NONINTRACTABLE HEADACHE, UNSPECIFIED HEADACHE TYPE: ICD-10-CM

## 2023-03-14 DIAGNOSIS — I15.8 OTHER SECONDARY HYPERTENSION: ICD-10-CM

## 2023-03-14 DIAGNOSIS — M25.551 RIGHT HIP PAIN: ICD-10-CM

## 2023-03-14 DIAGNOSIS — R07.9 CHEST PAIN, UNSPECIFIED TYPE: Primary | ICD-10-CM

## 2023-03-14 LAB
ALBUMIN SERPL-MCNC: 3.9 G/DL (ref 3.5–5.2)
ALP BLD-CCNC: 85 U/L (ref 35–104)
ALT SERPL-CCNC: 19 U/L (ref 0–32)
ANION GAP SERPL CALCULATED.3IONS-SCNC: 9 MMOL/L (ref 7–16)
AST SERPL-CCNC: 16 U/L (ref 0–31)
BACTERIA: NORMAL /HPF
BASOPHILS ABSOLUTE: 0.02 E9/L (ref 0–0.2)
BASOPHILS RELATIVE PERCENT: 0.3 % (ref 0–2)
BILIRUB SERPL-MCNC: 0.4 MG/DL (ref 0–1.2)
BILIRUBIN URINE: NEGATIVE
BLOOD, URINE: NEGATIVE
BUN BLDV-MCNC: 22 MG/DL (ref 6–23)
CALCIUM SERPL-MCNC: 9.2 MG/DL (ref 8.6–10.2)
CHLORIDE BLD-SCNC: 101 MMOL/L (ref 98–107)
CLARITY: CLEAR
CO2: 28 MMOL/L (ref 22–29)
COLOR: YELLOW
CREAT SERPL-MCNC: 1.3 MG/DL (ref 0.5–1)
EKG ATRIAL RATE: 66 BPM
EKG P AXIS: 60 DEGREES
EKG P-R INTERVAL: 216 MS
EKG Q-T INTERVAL: 496 MS
EKG QRS DURATION: 144 MS
EKG QTC CALCULATION (BAZETT): 519 MS
EKG R AXIS: 40 DEGREES
EKG T AXIS: 0 DEGREES
EKG VENTRICULAR RATE: 66 BPM
EOSINOPHILS ABSOLUTE: 0.15 E9/L (ref 0.05–0.5)
EOSINOPHILS RELATIVE PERCENT: 2.4 % (ref 0–6)
EPITHELIAL CELLS, UA: NORMAL /HPF
GFR SERPL CREATININE-BSD FRML MDRD: 45 ML/MIN/1.73
GLUCOSE BLD-MCNC: 152 MG/DL (ref 74–99)
GLUCOSE URINE: NEGATIVE MG/DL
HCT VFR BLD CALC: 41.8 % (ref 34–48)
HEMOGLOBIN: 13.5 G/DL (ref 11.5–15.5)
IMMATURE GRANULOCYTES #: 0.01 E9/L
IMMATURE GRANULOCYTES %: 0.2 % (ref 0–5)
KETONES, URINE: NEGATIVE MG/DL
LEUKOCYTE ESTERASE, URINE: NEGATIVE
LYMPHOCYTES ABSOLUTE: 0.93 E9/L (ref 1.5–4)
LYMPHOCYTES RELATIVE PERCENT: 15.1 % (ref 20–42)
MCH RBC QN AUTO: 31.5 PG (ref 26–35)
MCHC RBC AUTO-ENTMCNC: 32.3 % (ref 32–34.5)
MCV RBC AUTO: 97.7 FL (ref 80–99.9)
MONOCYTES ABSOLUTE: 0.27 E9/L (ref 0.1–0.95)
MONOCYTES RELATIVE PERCENT: 4.4 % (ref 2–12)
NEUTROPHILS ABSOLUTE: 4.79 E9/L (ref 1.8–7.3)
NEUTROPHILS RELATIVE PERCENT: 77.6 % (ref 43–80)
NITRITE, URINE: NEGATIVE
PDW BLD-RTO: 11.8 FL (ref 11.5–15)
PH UA: 6 (ref 5–9)
PLATELET # BLD: 246 E9/L (ref 130–450)
PMV BLD AUTO: 10.5 FL (ref 7–12)
POTASSIUM REFLEX MAGNESIUM: 4.6 MMOL/L (ref 3.5–5)
PRO-BNP: 981 PG/ML (ref 0–125)
PROTEIN UA: NEGATIVE MG/DL
RBC # BLD: 4.28 E12/L (ref 3.5–5.5)
RBC UA: NORMAL /HPF (ref 0–2)
SODIUM BLD-SCNC: 138 MMOL/L (ref 132–146)
SPECIFIC GRAVITY UA: 1.01 (ref 1–1.03)
TOTAL PROTEIN: 7 G/DL (ref 6.4–8.3)
TROPONIN, HIGH SENSITIVITY: 11 NG/L (ref 0–9)
TROPONIN, HIGH SENSITIVITY: 11 NG/L (ref 0–9)
UROBILINOGEN, URINE: 0.2 E.U./DL
WBC # BLD: 6.2 E9/L (ref 4.5–11.5)
WBC UA: NORMAL /HPF (ref 0–5)

## 2023-03-14 PROCEDURE — 73552 X-RAY EXAM OF FEMUR 2/>: CPT

## 2023-03-14 PROCEDURE — 80053 COMPREHEN METABOLIC PANEL: CPT

## 2023-03-14 PROCEDURE — 6370000000 HC RX 637 (ALT 250 FOR IP): Performed by: PHYSICIAN ASSISTANT

## 2023-03-14 PROCEDURE — 93005 ELECTROCARDIOGRAM TRACING: CPT | Performed by: PHYSICIAN ASSISTANT

## 2023-03-14 PROCEDURE — 93010 ELECTROCARDIOGRAM REPORT: CPT | Performed by: INTERNAL MEDICINE

## 2023-03-14 PROCEDURE — 72192 CT PELVIS W/O DYE: CPT

## 2023-03-14 PROCEDURE — 99285 EMERGENCY DEPT VISIT HI MDM: CPT

## 2023-03-14 PROCEDURE — 6370000000 HC RX 637 (ALT 250 FOR IP): Performed by: EMERGENCY MEDICINE

## 2023-03-14 PROCEDURE — 70450 CT HEAD/BRAIN W/O DYE: CPT

## 2023-03-14 PROCEDURE — 81001 URINALYSIS AUTO W/SCOPE: CPT

## 2023-03-14 PROCEDURE — 85025 COMPLETE CBC W/AUTO DIFF WBC: CPT

## 2023-03-14 PROCEDURE — 2500000003 HC RX 250 WO HCPCS: Performed by: PHYSICIAN ASSISTANT

## 2023-03-14 PROCEDURE — 99223 1ST HOSP IP/OBS HIGH 75: CPT | Performed by: INTERNAL MEDICINE

## 2023-03-14 PROCEDURE — 96374 THER/PROPH/DIAG INJ IV PUSH: CPT

## 2023-03-14 PROCEDURE — 71046 X-RAY EXAM CHEST 2 VIEWS: CPT

## 2023-03-14 PROCEDURE — 36415 COLL VENOUS BLD VENIPUNCTURE: CPT

## 2023-03-14 PROCEDURE — 83880 ASSAY OF NATRIURETIC PEPTIDE: CPT

## 2023-03-14 PROCEDURE — 84484 ASSAY OF TROPONIN QUANT: CPT

## 2023-03-14 RX ORDER — OXYCODONE HYDROCHLORIDE AND ACETAMINOPHEN 5; 325 MG/1; MG/1
1 TABLET ORAL EVERY 6 HOURS PRN
Qty: 10 TABLET | Refills: 0 | Status: SHIPPED | OUTPATIENT
Start: 2023-03-14 | End: 2023-03-17

## 2023-03-14 RX ORDER — OXYCODONE HYDROCHLORIDE AND ACETAMINOPHEN 5; 325 MG/1; MG/1
1 TABLET ORAL ONCE
Status: COMPLETED | OUTPATIENT
Start: 2023-03-14 | End: 2023-03-14

## 2023-03-14 RX ORDER — NITROGLYCERIN 0.4 MG/1
0.4 TABLET SUBLINGUAL ONCE
Status: COMPLETED | OUTPATIENT
Start: 2023-03-14 | End: 2023-03-14

## 2023-03-14 RX ORDER — HYDRALAZINE HYDROCHLORIDE 100 MG/1
100 TABLET, FILM COATED ORAL 3 TIMES DAILY
COMMUNITY
End: 2023-03-15 | Stop reason: SDUPTHER

## 2023-03-14 RX ORDER — BUMETANIDE 0.25 MG/ML
1 INJECTION, SOLUTION INTRAMUSCULAR; INTRAVENOUS ONCE
Status: COMPLETED | OUTPATIENT
Start: 2023-03-14 | End: 2023-03-14

## 2023-03-14 RX ADMIN — BUMETANIDE 1 MG: 0.25 INJECTION INTRAMUSCULAR; INTRAVENOUS at 16:20

## 2023-03-14 RX ADMIN — NITROGLYCERIN 0.4 MG: 0.4 TABLET, ORALLY DISINTEGRATING SUBLINGUAL at 13:43

## 2023-03-14 RX ADMIN — OXYCODONE AND ACETAMINOPHEN 1 TABLET: 5; 325 TABLET ORAL at 16:19

## 2023-03-14 ASSESSMENT — LIFESTYLE VARIABLES
HOW MANY STANDARD DRINKS CONTAINING ALCOHOL DO YOU HAVE ON A TYPICAL DAY: PATIENT DOES NOT DRINK
HOW OFTEN DO YOU HAVE A DRINK CONTAINING ALCOHOL: NEVER

## 2023-03-14 ASSESSMENT — PAIN SCALES - GENERAL
PAINLEVEL_OUTOF10: 8
PAINLEVEL_OUTOF10: 8

## 2023-03-14 ASSESSMENT — PAIN - FUNCTIONAL ASSESSMENT: PAIN_FUNCTIONAL_ASSESSMENT: 0-10

## 2023-03-14 NOTE — ED PROVIDER NOTES
Shared [facility]-ED Attending Visit. CC: No                                                                                                                                      Department of Emergency Medicine   ED  Provider Note  Admit Date/RoomTime: 3/14/2023 11:41 AM  ED Room: 15/15        HPI:  3/14/23,   Time: 12:10 PM EDT         Presley Ruiz is a 75 y.o. female presenting to the ED for HA, CP and elevated blood pressure, beginning few days ago. The complaint has been intermittent, mild in severity, and worsened by activity the patient states that she was advised to come to the emergency room by her nephrologist secondary to elevated blood pressure. She does have an extensive past history of coronary artery disease, diabetes mellitus type 2, chronic respiratory failure, obstructive sleep apnea, COPD, chronic kidney disease, hyperlipidemia, gastroesophageal reflux disease, ischemic cardiomyopathy, and chronic back pain. The patient states that she is on numerous medications. She is supposed to take her blood pressure and then call her nephrologist.  She states that her blood pressure at home was elevated with a diastolic pressure of 903 which is what prompted them to tell her to come in. The ambulance service reports that her pressure was 146/92. Her blood pressure for us at this moment was 140/60. The patient has been having intermittent shortness of breath and chest pain over the weekend. She describes a frontal headache as well. There was no fall or trauma. Denies upper respiratory complaints. She states that her chest pain at this point is just mild. The pain is midline in the lower sternal region. She has chronic lower extremity edema. The patient is on oxygen at home via nasal cannula as needed.         ROS:     Constitutional: Negative for fever and chills  HENT: Negative for ear pain, sore throat and sinus pressure  Eyes: Negative for pain, discharge and redness  Respiratory:  See HPI  Cardiovascular:  See HPI  Gastrointestinal: Negative for nausea, vomiting, diarrhea and abdominal distention  Genitourinary: Negative for dysuria and frequency  Musculoskeletal: Negative for back pain and arthralgia  Skin: Negative for rash and wound  Neurological: Negative for weakness and headaches  Hematological: Negative for adenopathy    All other systems reviewed and are negative      -------------------------------- PAST HISTORY ----------------------------------  Past Medical History:  has a past medical history of Asthma, Atherosclerosis of native artery of left leg with rest pain ([address] CAD (coronary artery disease), Cellulitis and abscess of trunk, Cerebellar infarct (HCC), Chronic back pain, Chronic kidney disease, Chronic systolic congestive heart failure (HCC), Chronic venous insufficiency, COPD (chronic obstructive pulmonary disease) ([address] COVID-19, Depression, Diabetes mellitus ([address] Diabetic neuropathy ([address] Diverticulosis, Fatty liver, Glaucoma, open angle, Hepatic encephalopathy ([address] Hiatal hernia, History of blood transfusion, Hyperlipidemia, Hyperplastic colon polyp, Hypertension, Incontinence, Liver mass, Morbid obesity ([address] Movement disorder, Myocardial infarction ([address] O2 dependent, Osteoarthritis, generalized, Pain in both lower legs, Peripheral vascular disease ([address] Pneumonia, Pulmonary edema, PVD (peripheral vascular disease) with claudication ([address] Sleep apnea, Status post peripheral artery angioplasty, Tobacco abuse, Tobacco abuse, Tobacco dependence, Tubular adenoma polyp of rectum, Urinary tract infection due to ESBL Klebsiella, and Ventral hernia. Past Surgical History:  has a past surgical history that includes knee surgery; Upper gastrointestinal endoscopy (12/20/12); Coronary artery bypass graft; Layered wound closure (Left, [id number]); Echo Complete (10/9/2013); polysomnography (1/2016); liver biopsy (1/8/2016); bronchial brush biopsy (1/25/2013);  Breast surgery (2000); Cholecystectomy (YRS AGO); Cardiac surgery (12/2011); Cardiac catheterization (04/20/2015); Hysterectomy; joint replacement (2011); Upper gastrointestinal endoscopy (12/23/2016); Colonoscopy (11/15/2013); Colonoscopy (12/23/2016); Upper gastrointestinal endoscopy (02/08/2017); Endoscopy, colon, diagnostic (04/18/2017); Gallbladder surgery; angioplasty (11/13/2018); and laryngoscopy (N/A, 4/4/2022). Social History:  reports that she has been smoking cigarettes. She started smoking about 48 years ago. She has a 10.00 pack-year smoking history. She has never used smokeless tobacco. She reports that she does not currently use alcohol. She reports current drug use. Drug: Marijuana [place]). Family History: family history includes Asthma in her father; Cancer in her mother. The patients home medications have been reviewed.     Allergies: Latex, Bee venom, Dilaudid [hydromorphone hcl], Dye [iodides], Keflex [cephalexin], Acetaminophen, Bee pollen, Lasix [furosemide], Levaquin [levofloxacin in d5w], Lipitor, Lyrica [pregabalin], Morphine, Naproxen, Nefazodone, Norvasc [amlodipine], Oxycodone-acetaminophen, Shellfish-derived products, and Trazodone and nefazodone    --------------------------------- RESULTS ------------------------------------------  All laboratory and radiology results have been personally reviewed by myself   LABS:  Results for orders placed or performed during the hospital encounter of 03/14/23   CBC with Auto Differential   Result Value Ref Range    WBC 6.2 4.5 - 11.5 E9/L    RBC 4.28 3.50 - 5.50 E12/L    Hemoglobin 13.5 11.5 - 15.5 g/dL    Hematocrit 41.8 34.0 - 48.0 %    MCV 97.7 80.0 - 99.9 fL    MCH 31.5 26.0 - 35.0 pg    MCHC 32.3 32.0 - 34.5 %    RDW 11.8 11.5 - 15.0 fL    Platelets 967 278 - 871 E9/L    MPV 10.5 7.0 - 12.0 fL    Neutrophils % 77.6 43.0 - 80.0 %    Immature Granulocytes % 0.2 0.0 - 5.0 %    Lymphocytes % 15.1 (L) 20.0 - 42.0 %    Monocytes % 4.4 2.0 - 12.0 % Eosinophils % 2.4 0.0 - 6.0 %    Basophils % 0.3 0.0 - 2.0 %    Neutrophils Absolute 4.79 1.80 - 7.30 E9/L    Immature Granulocytes # 0.01 E9/L    Lymphocytes Absolute 0.93 (L) 1.50 - 4.00 E9/L    Monocytes Absolute 0.27 0.10 - 0.95 E9/L    Eosinophils Absolute 0.15 0.05 - 0.50 E9/L    Basophils Absolute 0.02 0.00 - 0.20 E9/L   Comprehensive Metabolic Panel w/ Reflex to MG   Result Value Ref Range    Sodium 138 132 - 146 mmol/L    Potassium reflex Magnesium 4.6 3.5 - 5.0 mmol/L    Chloride 101 98 - 107 mmol/L    CO2 28 22 - 29 mmol/L    Anion Gap 9 7 - 16 mmol/L    Glucose 152 (H) 74 - 99 mg/dL    BUN 22 6 - 23 mg/dL    Creatinine 1.3 (H) 0.5 - 1.0 mg/dL    Est, Glom Filt Rate 45 >=60 mL/min/1.73    Calcium 9.2 8.6 - 10.2 mg/dL    Total Protein 7.0 6.4 - 8.3 g/dL    Albumin 3.9 3.5 - 5.2 g/dL    Total Bilirubin 0.4 0.0 - 1.2 mg/dL    Alkaline Phosphatase 85 35 - 104 U/L    ALT 19 0 - 32 U/L    AST 16 0 - 31 U/L   Troponin   Result Value Ref Range    Troponin, High Sensitivity 11 (H) 0 - 9 ng/L   Brain Natriuretic Peptide   Result Value Ref Range    Pro- (H) 0 - 125 pg/mL   Troponin   Result Value Ref Range    Troponin, High Sensitivity 11 (H) 0 - 9 ng/L   Urinalysis with Microscopic   Result Value Ref Range    Color, UA Yellow Straw/Yellow    Clarity, UA Clear Clear    Glucose, Ur Negative Negative mg/dL    Bilirubin Urine Negative Negative    Ketones, Urine Negative Negative mg/dL    Specific Gravity, UA 1.010 1.005 - 1.030    Blood, Urine Negative Negative    pH, UA 6.0 5.0 - 9.0    Protein, UA Negative Negative mg/dL    Urobilinogen, Urine 0.2 <2.0 E.U./dL    Nitrite, Urine Negative Negative    Leukocyte Esterase, Urine Negative Negative    WBC, UA NONE 0 - 5 /HPF    RBC, UA NONE 0 - 2 /HPF    Epithelial Cells, UA FEW /HPF    Bacteria, UA NONE SEEN None Seen /HPF   EKG 12 Lead   Result Value Ref Range    Ventricular Rate 66 BPM    Atrial Rate 66 BPM    P-R Interval 216 ms    QRS Duration 144 ms    Q-T Interval 496 ms    QTc Calculation (Bazett) 519 ms    P Axis 60 degrees    R Axis 40 degrees    T Axis 0 degrees       RADIOLOGY:  Interpreted by Radiologist.  CT PELVIS WO CONTRAST Additional Contrast? None   Final Result   1. No fracture or joint dislocation. 2. Mild degenerative spurring in the acetabuli of the hips. 3. Severe sigmoid diverticulosis without diverticulitis. 4. Stable  subcutaneous fat stranding in the anterior mid abdominal region,   which may represent scarring or lipohypertrophy related to insulin injection. RECOMMENDATIONS:   1. No fracture or joint dislocation. 2.      XR FEMUR RIGHT (MIN 2 VIEWS)   Final Result   No acute osseous abnormality. Osteoarthritis in the visualized knee. XR CHEST (2 VW)   Final Result   Suspect early CHF. CT HEAD WO CONTRAST   Final Result   No acute intracranial abnormality. Specifically, there is no acute   intracranial hemorrhage             ----------------- NURSING NOTES AND VITALS REVIEWED ---------------   The nursing notes within the ED encounter and vital signs as below have been reviewed. BP (!) 146/75   Pulse 66   Temp 97.2 °F (36.2 °C) (Oral)   Resp 16   Ht 5' 2\" (1.575 m)   Wt 190 lb (86.2 kg)   LMP 01/01/1990   SpO2 96%   BMI 34.75 kg/m²   Oxygen Saturation Interpretation: Normal      --------------------------------PHYSICAL EXAM------------------------------------      Constitutional/General: Alert and oriented x3, obese. O2 via NC.   NAD  Head: NC/AT  Eyes: PERRL, EOMI  Mouth: Oropharynx clear, handling secretions, no trismus  Neck: Supple, full ROM, no meningeal signs  Pulmonary: Lungs decreased but clear to auscultation bilaterally, no wheezes, rales, or rhonchi. Not in respiratory distress  Cardiovascular:  Regular rate and rhythm, no murmurs, gallops, or rubs. 2+ distal pulses  Abdomen: Soft, + BS. No distension. Nontender.   No palpable rigidity, rebound or guarding  Extremities: Moves all extremities x 4. Warm and well perfused  Skin: warm and dry without rash  Neurologic: GCS 15,  Intact. No focal deficits  Psych: Normal Affect      ------------------------ ED COURSE/MEDICAL DECISION MAKING----------------------  Medications   nitroGLYCERIN (NITROSTAT) SL tablet 0.4 mg (0.4 mg SubLINGual Given 3/14/23 1343)   bumetanide (BUMEX) injection 1 mg (1 mg IntraVENous Given 3/14/23 1620)   oxyCODONE-acetaminophen (PERCOCET) 5-325 MG per tablet 1 tablet (1 tablet Oral Given 3/14/23 1619)         Medical Decision Making:    The patient is here today via EMS because of elevated blood pressure at home. Her BP on arrival here was normal.  She was reporting some intermittent chest pain, headache and shortness of breath for the past 3 days. She indicated to Dr. Nga Sanchez that this was worse with exertion. The patient has an extensive past history including COPD, ischemic cardiomyopathy, coronary artery disease, obstructive sleep apnea and tobacco abuse. She follows with the cardiologist Dr. Markie Arboleda. The patient states she did take all of her medications today. She is having minimal chest discomfort at this time. An extensive work-up was undertaken. Her EKG showed no acute changes and her initial troponin was 11. Chest x-ray indicated some mild early congestive heart failure and the patient was given a one-time dose of Bumex here. Her course was discussed with Dr. Rajesh Gordillo who was on-call for the cardiology group. Please refer to his notes today. He states that she has had this pain for 3 days and based on his evaluation and review of the chart he did not feel that this was cardiac in nature. We went in to speak with the patient again regarding discharge and she started complaining of pain in her right hip and lower pelvic region. Dr. Nga Sanchez ordered a noncontrast CT of the pelvis as well as an x-ray of the patient's right hip. I did give her a Percocet for pain here x1.   She has this listed as an allergy but tells me she can take it without issue. Results of additional imaging reviewed. Right hip X-ray shows mild DJD. CT pelvis essentially unremarkable. F/U PCP as needed. Advised to call Dr. Pauly Larson as well for follow-up      ED Course as of 03/14/23 1701   Tue Mar 14, 2023   1224 EC  Ecg read by me  Sinus rhythm  1st degree block  RBBB prolong qtc no acute ischemia  Stable when compare w prior ecg [SHABBIR]      ED Course User Index  [SHABBIR] Jony Larsen DO       Counseling: The emergency provider has spoken with the patient and friend and discussed todays results, in addition to providing specific details for the plan of care and counseling regarding the diagnosis and prognosis. Questions are answered at this time and they are agreeable with the plan.      ------------------------ IMPRESSION AND DISPOSITION -------------------------------    IMPRESSION  1. Chest pain, unspecified type    2. Right hip pain    3. Other secondary hypertension    4.  Acute nonintractable headache, unspecified headache type        DISPOSITION  Disposition: Discharge to home  Patient condition is stable                    Raiza Sneed PA-C  23

## 2023-03-14 NOTE — CONSULTS
INPATIENT CARDIOLOGY CONSULT    Name: Alex Jasso    Age: 75 y.o. Date of Admission: 3/14/2023 11:41 AM    Date of Service: 3/14/2023    Reason for Consultation: Noncardiac chest pain, coronary atherosclerosis, ischemic cardiomyopathy, chronic combined systolic and diastolic heart failure, chronic obstructive lung disease, hypertension, peripheral arterial disease, chronic kidney disease, moderate obesity, obstructive sleep apnea    Referring Physician: Marti Wright DO    History of Present Illness: The patient is a 67-year-old black female known to [facility] with primary cardiovascular care provided by Oziel Stone. She has a known history of coronary atherosclerosis with prior surgical revascularization in 2011 with a left internal mammary graft to the left anterior descending and saphenous vein graft to the circumflex marginal and most recent coronary angiography in April, 2015 demonstrating no evidence of significant disease of the left main, left anterior descending or right coronary distributions with an inability to access the left internal mammary graft due to tortuosity of the subclavian distribution and a patent saphenous vein graft graft to circumflex. She has most recently undergone objective assessment in April, 2022 with an echocardiogram demonstrating evidence of a normal-sized left ventricular chamber with severe concentric left ventricular hypertrophy and left ventricular systolic function at the lower limits of normal with an estimated ejection fraction of 50-55% mild mitral regurgitation as well as perfusion imaging reporting a partially reversible inferior and inferolateral perfusion apnea with associated wall motion abnormalities and mild left ventricular systolic dysfunction with an estimated post stress ejection fraction of approximately 40%.   Additionally has a history of hypertension, diabetes with associated stage IIIb chronic kidney disease, hyperlipidemia, chronic obstructive lung disease in the face of ongoing tobacco and marijuana usage the former presently approximately 1/2 pack of cigarettes on a daily basis, peripheral arterial disease with popliteal and tibial revascularization within her left lower extremity moderate obesity and obstructive sleep apnea with needs of noninvasive ventilation. She was clinically compensated from a cardiovascular standpoint at the time of her most recent clinical assessment approximately 3 months earlier with subsequent hypertension and recent modification of her sacubitril/valsartan dosage in addition to that of hydralazine at recommendations of the nephrology service. She presented to the emergency room with reports of hypertension and reported diastolic blood pressures of 130 mmHg with present uncertainty regarding actual doses of above-referenced medications presently being administered and over the past 48 hours additional symptoms of a headache in the absence of focal neurologic symptomatology as well as that of discomfort of her upper extremities and a continuous pressure-like feeling in her chest over the past 48 hours without additional ischemic equivalents and reproducible at the time of her present hospitalization. In spite of her reports of hypertension, at the time of summoning of emergency medical personnel and while in the emergency room, stage I hypertension was documented.   In addition, a resting electrocardiogram obtained in the emergency room and reviewed at the time of evaluation demonstrated evidence of sinus rhythm with a borderline first-degree atrioventricular block and TX interval approximately 215 ms with a right bundle branch block conduction pattern and possible evidence of an age-indeterminate inferior infarct unchanged from that of her most recent tracing as well as a chest x-ray again reviewed and demonstrating limited inspiratory effort with borderline cardiomegaly and mild interstitial infiltrates. In spite of her prolonged discomfort, no significant elevation of high-sensitivity troponin levels were noted with present levels equivalent to that previously documented as well as that of a proBNP level of 980 pg/mL lightly decreased from that of previous values most recently obtained and no change of the status of her chronic kidney disease. .    Review of Systems: The remainder of a complete multisystem review including consitutional, central nervous, respiratory, circulatory, gastrointestinal, genitourinary, endocrinologic, hematologic, musculoskeletal and psychiatric are negative.     Past Medical History:  Past Medical History:   Diagnosis Date    Asthma     Atherosclerosis of native artery of left leg with rest pain ([address] 11/09/2018    CAD (coronary artery disease) 2011    Cellulitis and abscess of trunk 04/14/2015    Cerebellar infarct ([address] 04/03/2019    Remote, rt. cerebellum, head CT scan, 1/9/19    Chronic back pain     Chronic kidney disease     Chronic systolic congestive heart failure ([address] 10/04/2013    Chronic venous insufficiency 10/11/2017    COPD (chronic obstructive pulmonary disease) ([address]     COVID-19     Depression     Diabetes mellitus ([address]     Diabetic neuropathy ([address]     Diverticulosis     Fatty liver 01/08/2016    per US    Glaucoma, open angle 02/01/2016    Mild-OU    Hepatic encephalopathy ([address] 02/07/2016    resolved    Hiatal hernia     History of blood transfusion     Hyperlipidemia     Hyperplastic colon polyp     Hypertension     Incontinence     Liver mass     Morbid obesity ([address]     Movement disorder     Myocardial infarction ([address] 2011    O2 dependent 09/16/2021    with bipap 3 liters    Osteoarthritis, generalized     Pain in both lower legs 10/11/2017    Peripheral vascular disease ([address]     Pneumonia 01/26/2014    Pulmonary edema     resolved    PVD (peripheral vascular disease) with claudication ([address] 08/30/2017    Sleep apnea     uses BI PAP    Status post peripheral artery angioplasty 10/31/2019    Tobacco abuse     Tobacco abuse 10/11/2017    Tobacco dependence 6/30/2022    Tubular adenoma polyp of rectum     Urinary tract infection due to ESBL Klebsiella 03/08/2017    Ventral hernia        Past Surgical History:  Past Surgical History:   Procedure Laterality Date    ANGIOPLASTY  11/13/2018    Dr. Gruber Dameron & athrectomy L SFA & Popliteal    BREAST SURGERY  2000    bilateral reduction    BRONCHIAL BRUSH BIOPSY  1/25/2013    Dr Mina Rodriguez  04/20/2015    Dr. Yumi Hernandez  12/2011     DR. Barraza Child,  follows Dr Lissette Swann  11/15/2013    Dr Stephan Elias    COLONOSCOPY  12/23/2016    Dr Concetta Villavicencio adenoma & hyperplastic polyps, melanosis coli (repeat one year 12/2017)    CORONARY ARTERY BYPASS GRAFT      ECHO COMPLETE  10/9/2013         ENDOSCOPY, COLON, DIAGNOSTIC  04/18/2017    GALLBLADDER SURGERY      gallstones removed, [facility] 8/2017    HYSTERECTOMY (CERVIX STATUS UNKNOWN)      JOINT REPLACEMENT  2011    LEFT KNEE    KNEE SURGERY      left knee replacement    LARYNGOSCOPY N/A 4/4/2022    DIRECT LARYNGOSCOPY WITH BIOPSY performed by Lloyd Lou DO at . [address] Left [id number]    LIVER BIOPSY  1/8/2016    [facility]    POLYSOMNOGRAPHY  1/2016    [facility]    UPPER GASTROINTESTINAL ENDOSCOPY  12/20/12    UPPER GASTROINTESTINAL ENDOSCOPY  12/23/2016    Dr Essie Jimenez GASTROINTESTINAL ENDOSCOPY  02/08/2017       Family History:  Family History   Problem Relation Age of Onset    Cancer Mother     Asthma Father        Social History:  Social History     Socioeconomic History    Marital status:       Spouse name: Not on file    Number of children: 2    Years of education: Not on file    Highest education level: Not on file   Occupational History    Occupation: disability   Tobacco Use    Smoking status: Every Day     Packs/day: 0.25     Years: 40.00 Pack years: 10.00     Types: Cigarettes     Start date: 8/10/1974    Smokeless tobacco: Never    Tobacco comments:     2 cigs daily   Vaping Use    Vaping Use: Never used   Substance and Sexual Activity    Alcohol use: Not Currently     Comment: social    Drug use: Yes     Types: Marijuana Elisa Voss)     Comment: \"every 4th or 5th day out of the week\"    Sexual activity: Not on file   Other Topics Concern    Not on file   Social History Narrative    Pt lives with brother in her house-pt has never driven a car and depends on transportation     Social Determinants of Health     Financial Resource Strain: Not on file   Food Insecurity: Not on file   Transportation Needs: Not on file   Physical Activity: Not on file   Stress: Not on file   Social Connections: Not on file   Intimate Partner Violence: Not on file   Housing Stability: Not on file       Allergies: Allergies   Allergen Reactions    Latex Hives    Bee Venom Anaphylaxis    Dilaudid [Hydromorphone Hcl] Itching    Dye [Iodides] Hives and Shortness Of Breath    Keflex [Cephalexin] Itching and Rash    Acetaminophen     Bee Pollen     Lasix [Furosemide] Other (See Comments)     Pt states she cramps up and gets headaches    Levaquin [Levofloxacin In D5w] Hives    Lipitor      MUSCLE SPASMS    Lyrica [Pregabalin]      Dream disturbances    Morphine Hives    Naproxen      Unsure of reaction;pt states able to take Aleve without difficulties    Nefazodone Other (See Comments)    Norvasc [Amlodipine] Swelling    Oxycodone-Acetaminophen Swelling    Shellfish-Derived Products     Trazodone And Nefazodone        Home Medications:  Prior to Admission medications    Medication Sig Start Date End Date Taking?  Authorizing Provider   hydrALAZINE (APRESOLINE) 100 MG tablet Take 100 mg by mouth 3 times daily    Historical Provider, MD   sacubitril-valsartan (ENTRESTO)  MG per tablet Take 1 tablet by mouth 2 times daily 3/13/23   Segun Cohen MD   carvedilol (COREG) 25 MG tablet TAKE 1 TABLET BY MOUTH TWICE DAILY WITH MEALS 1/20/23   Sima Domingo MD   bumetanide (BUMEX) 1 MG tablet TAKE 1 TABLET BY MOUTH DAILY 1/20/23   Sima Domingo MD   dexlansoprazole (DEXILANT) 60 MG CPDR delayed release capsule TAKE 1 CAPSULE BY MOUTH EVERY DAY 9/27/22   Historical Provider, MD   famotidine (PEPCID) 40 MG tablet TAKE ONE TABLET DAILY 1/2 HOUR BEFORE DINNER 10/6/22   Historical Provider, MD   BD ULTRA-FINE PEN NEEDLES 29G X 12.7MM MISC USE AS DIRECTED 3 TIMES A DAY 10/14/22   Historical Provider, MD   SPIRIVA RESPIMAT 1.25 MCG/ACT AERS inhaler INHALE TWO (2) PUFFS BY MOUTH EVERY DAY 10/12/22   Historical Provider, MD   simvastatin (ZOCOR) 20 MG tablet Take 1 tablet by mouth nightly 12/13/22   Sima Domingo MD   isosorbide mononitrate (IMDUR) 60 MG extended release tablet Take 1 tablet by mouth daily 11/7/22   Sima Domingo MD   cetirizine (ZYRTEC) 10 MG tablet TAKE 1 TABLET BY MOUTH EVERY DAY 11/7/22   Lawyer Natalya Diaz,    cyclobenzaprine (FLEXERIL) 5 MG tablet TAKE 1 TABLET BY MOUTH EVERY 12 HOURS FOR 2 WEEKS AS NEEDED FOR MUSCLE SPASM 7/14/22   Historical Provider, MD   oxybutynin (DITROPAN-XL) 10 MG extended release tablet Take 10 mg by mouth daily    Historical Provider, MD RACHEL -25 MCG/INH AEPB inhaler Inhale 1 puff into the lungs daily 8/11/22   Historical Provider, MD   YUPELRI 175 MCG/3ML SOLN Inhale 3 mLs into the lungs daily 6/8/22   Historical Provider, MD   isosorbide mononitrate (IMDUR) 60 MG extended release tablet Take 60 mg by mouth in the morning. Historical Provider, MD   fluticasone (FLONASE) 50 MCG/ACT nasal spray 2 sprays by Each Nostril route in the morning. 7/27/22   Willian Smallwood, DO   aspirin 81 MG chewable tablet Take 1 tablet by mouth daily 7/6/22   Sima Domingo MD   nitroGLYCERIN (NITROSTAT) 0.3 MG SL tablet Place 0.3 mg under the tongue every 5 minutes as needed for Chest pain up to max of 3 total doses.  If no relief after 1 dose, call 911. Historical Provider, MD   DULoxetine (CYMBALTA) 60 MG extended release capsule Take 60 mg by mouth daily Patient only has supply to take once a day    Historical Provider, MD   gabapentin (NEURONTIN) 100 MG capsule Take 100 mg by mouth 2 times daily. Historical Provider, MD   Insulin Degludec (TRESIBA FLEXTOUCH) 200 UNIT/ML SOPN Inject 10 Units into the skin at bedtime    Historical Provider, MD   montelukast (SINGULAIR) 10 MG tablet Take 10 mg by mouth nightly    Historical Provider, MD   traZODone (DESYREL) 100 MG tablet Take 100 mg by mouth nightly    Historical Provider, MD   vitamin D (CHOLECALCIFEROL) 25 MCG (1000 UT) TABS tablet Take 1,000 Units by mouth daily    Historical Provider, MD   docusate sodium (COLACE) 100 MG capsule Take 200 mg by mouth at bedtime  3/11/21   Historical Provider, MD       Current Medications:  No current facility-administered medications for this encounter.      Current Outpatient Medications   Medication Sig Dispense Refill    hydrALAZINE (APRESOLINE) 100 MG tablet Take 100 mg by mouth 3 times daily      sacubitril-valsartan (ENTRESTO)  MG per tablet Take 1 tablet by mouth 2 times daily 60 tablet 3    carvedilol (COREG) 25 MG tablet TAKE 1 TABLET BY MOUTH TWICE DAILY WITH MEALS 60 tablet 11    bumetanide (BUMEX) 1 MG tablet TAKE 1 TABLET BY MOUTH DAILY 30 tablet 11    dexlansoprazole (DEXILANT) 60 MG CPDR delayed release capsule TAKE 1 CAPSULE BY MOUTH EVERY DAY      famotidine (PEPCID) 40 MG tablet TAKE ONE TABLET DAILY 1/2 HOUR BEFORE DINNER      BD ULTRA-FINE PEN NEEDLES 29G X 12.7MM MISC USE AS DIRECTED 3 TIMES A DAY      SPIRIVA RESPIMAT 1.25 MCG/ACT AERS inhaler INHALE TWO (2) PUFFS BY MOUTH EVERY DAY      simvastatin (ZOCOR) 20 MG tablet Take 1 tablet by mouth nightly 90 tablet 3    isosorbide mononitrate (IMDUR) 60 MG extended release tablet Take 1 tablet by mouth daily 90 tablet 3    cetirizine (ZYRTEC) 10 MG tablet TAKE 1 TABLET BY MOUTH EVERY DAY 90 tablet 1    cyclobenzaprine (FLEXERIL) 5 MG tablet TAKE 1 TABLET BY MOUTH EVERY 12 HOURS FOR 2 WEEKS AS NEEDED FOR MUSCLE SPASM      oxybutynin (DITROPAN-XL) 10 MG extended release tablet Take 10 mg by mouth daily      BREO ELLIPTA 100-25 MCG/INH AEPB inhaler Inhale 1 puff into the lungs daily      YUPELRI 175 MCG/3ML SOLN Inhale 3 mLs into the lungs daily      isosorbide mononitrate (IMDUR) 60 MG extended release tablet Take 60 mg by mouth in the morning. fluticasone (FLONASE) 50 MCG/ACT nasal spray 2 sprays by Each Nostril route in the morning. 16 g 5    aspirin 81 MG chewable tablet Take 1 tablet by mouth daily 90 tablet 3    nitroGLYCERIN (NITROSTAT) 0.3 MG SL tablet Place 0.3 mg under the tongue every 5 minutes as needed for Chest pain up to max of 3 total doses. If no relief after 1 dose, call 911. DULoxetine (CYMBALTA) 60 MG extended release capsule Take 60 mg by mouth daily Patient only has supply to take once a day      gabapentin (NEURONTIN) 100 MG capsule Take 100 mg by mouth 2 times daily. Insulin Degludec (TRESIBA FLEXTOUCH) 200 UNIT/ML SOPN Inject 10 Units into the skin at bedtime      montelukast (SINGULAIR) 10 MG tablet Take 10 mg by mouth nightly      traZODone (DESYREL) 100 MG tablet Take 100 mg by mouth nightly      vitamin D (CHOLECALCIFEROL) 25 MCG (1000 UT) TABS tablet Take 1,000 Units by mouth daily      docusate sodium (COLACE) 100 MG capsule Take 200 mg by mouth at bedtime              Physical Exam:  /77   Pulse 66   Temp 97.2 °F (36.2 °C) (Oral)   Resp 16   Ht 5' 2\" (1.575 m)   Wt 190 lb (86.2 kg)   LMP 01/01/1990   SpO2 96%   BMI 34.75 kg/m²   Weight change: Wt Readings from Last 3 Encounters:   03/14/23 190 lb (86.2 kg)   01/10/23 192 lb (87.1 kg)   01/09/23 192 lb (87.1 kg)     The patient is awake, alert and in no discomfort or distress. No gross musculoskeletal deformity or lymphadenopathy are present.  No significant skin or nail changes are present. Gross examination of head, eyes, nose and throat are negative. Jugular venous pressure is normal and no carotid bruits are present. No thyromegaly is noted. Normal respiratory effort is noted with no accessory muscle usage present. Lung fields are clear to ascultation. Chest discomfort as noted at presentation was reproducible to palpation. Cardiac examination is notable for a regular rate and rhythm with no palpable thrill. No gallop rhythm or cardiac murmur are identified. A benign abdominal examination is present with the exception of obesity and no masses or organomegaly. A normal abdominal aortic pulsation is present. Intact pulses are present throughout all extremities and no peripheral edema is present. No focal neurologic deficits are present. Intake/Output:  No intake or output data in the 24 hours ending 03/14/23 1452  No intake/output data recorded. Laboratory Tests:  Lab Results   Component Value Date    CREATININE 1.3 (H) 03/14/2023    BUN 22 03/14/2023     03/14/2023    K 4.6 03/14/2023     03/14/2023    CO2 28 03/14/2023     No results for input(s): CKTOTAL, CKMB in the last 72 hours.     Invalid input(s): TROPONONI  Lab Results   Component Value Date     (H) 01/26/2014     Lab Results   Component Value Date/Time    WBC 6.2 03/14/2023 12:24 PM    RBC 4.28 03/14/2023 12:24 PM    HGB 13.5 03/14/2023 12:24 PM    HCT 41.8 03/14/2023 12:24 PM    MCV 97.7 03/14/2023 12:24 PM    MCH 31.5 03/14/2023 12:24 PM    MCHC 32.3 03/14/2023 12:24 PM    RDW 11.8 03/14/2023 12:24 PM     03/14/2023 12:24 PM    MPV 10.5 03/14/2023 12:24 PM     Recent Labs     03/14/23  1224   ALKPHOS 85   ALT 19   AST 16   PROT 7.0   BILITOT 0.4   LABALBU 3.9     Lab Results   Component Value Date/Time    MG 1.7 01/10/2023 04:46 PM     Lab Results   Component Value Date/Time    PROTIME 11.5 08/23/2022 06:00 PM    PROTIME 12.6 04/22/2011 05:41 PM    INR 1.0 08/23/2022 06:00 PM     Lab Results   Component Value Date/Time    TSH 0.646 02/05/2021 03:22 PM     No components found for: CHLPL  Lab Results   Component Value Date    TRIG 114 04/12/2022    TRIG 219 (H) 02/05/2021    TRIG 89 06/16/2020     Lab Results   Component Value Date    HDL 32 04/12/2022    HDL 27 02/05/2021    HDL 46 06/16/2020     Lab Results   Component Value Date    LDLCALC 74 04/12/2022 [address] 60 02/05/2021 [address] 98 06/16/2020         Cardiac Tests:  ECG: A resting electrocardiogram reviewed at the time of evaluation demonstrates evidence of sinus rhythm with a borderline first-degree atrioventricular block and AR interval approximately 215 ms with a right bundle branch block and possible inferior infarct of undetermined age with no changes from previous tracings  Telemetry findings reviewed: sinus rhythm, no new tachy/bradyarrhythmias overnight  Chest X-ray: A chest x-ray reviewed at the time of evaluation demonstrates limited inspiratory effort with evidence of cardiomegaly and borderline interstitial changes  Last Echocardiogram: An echocardiogram of April, 2022 demonstrated evidence of a normal-sized left ventricular chamber with severe concentric left ventricular hypertrophy and left ventricular systolic function at the lower limits of normal with an estimated ejection fraction of 50-55% with mild mitral regurgitation  Last stress test: A gated vasodilator myocardial perfusion imaging study of April, 2022 demonstrated a moderate size moderate intensity partially reversible inferior and inferolateral perfusion abnormality with an associated wall motion abnormality and mild left ventricular systolic dysfunction with a post stress ejection fraction of approximately 40%  Last cardiac catheterization: Coronary angiography of April, 2015 demonstrated no evidence of angiographically significant disease of the left main trunk, left anterior descending and right coronary distribution with an inability to selectively engage the left internal mammary graft secondary to significant tortuosity of the left subclavian system and a patent saphenous vein graft to the circumflex    ASSESSMENT / PLAN: On a clinical basis, the patient presents with noncardiac chest discomfort with symptoms of over 48 hours duration and reproducible to present examination and spite of her existing significant coronary atherosclerosis with no evidence of electrocardiographic changes from her baseline tracing and unremarkable high-sensitivity troponin levels. In spite of her reports of hypertension on home blood pressure monitoring, that of emergency medical personnel and that documented in the emergency room demonstrates evidence of stage I hypertension with recent modification of her antihypertensive medical regimen performed by her nephrologist as well as modification of her sacubitril/valsartan dosage. Presently, no additional cardiovascular evaluation is indicated with further decisions regarding present management deferred to the emergency room staff. At the time of evaluation, an extensive discussion was held with she and the presence of family regarding her tobacco consumption both related to that of cigarettes and marijuana and its adverse effects on her chronic obstructive lung disease as well as that of her atherosclerotic risk. In addition, ongoing lifestyle modification remains essential related to weight reduction to benefit diastolic cardiac performance in addition to that of management of her chronic obstructive sleep apnea with needs of continued compliance with the use of her noninvasive ventilator additionally to assist blood pressure regulation. Ongoing aggressive risk factor modification of blood pressure, diabetes and serum lipids will remain essential to reducing risk of future atherosclerotic development.   We will further evaluate her present weight should additional cardiovascular difficulties or concerns arise with recommended ongoing follow-up with her primary cardiologist, Alan Castorena as routinely scheduled. Thank you for allowing me to participate in your patient's care. Please feel free to contact me if you have any questions or concerns. Note: This report was completed using computerized voice recognition software. Every effort has been made to ensure accuracy, however; inadvertent computerized transcription errors may be present. Hai Topete.  Haja Garces, [address] [place] [address]

## 2023-03-15 RX ORDER — HYDRALAZINE HYDROCHLORIDE 100 MG/1
100 TABLET, FILM COATED ORAL 3 TIMES DAILY
Qty: 90 TABLET | Refills: 11 | Status: SHIPPED | OUTPATIENT
Start: 2023-03-15

## 2023-03-16 ENCOUNTER — HOSPITAL ENCOUNTER (OUTPATIENT)
Dept: OTHER | Age: 68
Setting detail: THERAPIES SERIES
Discharge: HOME | End: 2023-03-16
Payer: MEDICARE

## 2023-03-16 VITALS
SYSTOLIC BLOOD PRESSURE: 116 MMHG | RESPIRATION RATE: 16 BRPM | OXYGEN SATURATION: 96 % | WEIGHT: 186.4 LBS | BODY MASS INDEX: 34.3 KG/M2 | HEIGHT: 62 IN | DIASTOLIC BLOOD PRESSURE: 69 MMHG | HEART RATE: 68 BPM

## 2023-03-16 PROCEDURE — 99214 OFFICE O/P EST MOD 30 MIN: CPT

## 2023-03-16 ASSESSMENT — PATIENT HEALTH QUESTIONNAIRE - PHQ9
SUM OF ALL RESPONSES TO PHQ9 QUESTIONS 1 & 2: 0
2. FEELING DOWN, DEPRESSED OR HOPELESS: NOT AT ALL
1. LITTLE INTEREST OR PLEASURE IN DOING THINGS: NOT AT ALL

## 2023-03-16 ASSESSMENT — PAIN SCALES - GENERAL: PAINLEVEL_OUTOF10: 0

## 2023-03-16 NOTE — PROGRESS NOTES
Congestive Heart Failure [address]   1955          Referring Provider: Dr Preston Roa  Primary Care Physician: Dr Valdemar Lopez  Cardiologist: Dr Arlin Barthel  Nephrologist: Dr Hunter        History of Present Illness:     Paz Benjamin is a 75 y.o. female with a history of  Echo LVEF 50-55% on  4-13-22. ([address]) in 2011 CABG with history of severe LV dysfunction with EF 25% in 2013. Patient Story:  She does have chronic dyspnea with exertion, no shortness of breath at rest, or decline in overall functional capacity. She does not have orthopnea, PND, nocturnal cough or hemoptysis. She does have abdominal distention or bloating which is  chronic and improved this visit. Denies early satiety, anorexia/change in appetite. She currently is NOT on a daily loop oral diuretic, just PRN. She does not have lower extremity edema. She denies lightheadedness, dizziness. She denies palpitations, syncope or near syncope. She does not complain of chest pain, pressure, discomfort at todays visit. Was in ER with CP and HTN  and released 6hrs later ,labs noted from 3/14/13 ER visit. Says she complies with noninvasive ventilator use nightly. Hx covid 19 in April 2021. Cut back on tobacco to 2 ciggs daily  and marijuana use every 4 or 5 days.        Allergies   Allergen Reactions    Latex Hives    Bee Venom Anaphylaxis    Dilaudid [Hydromorphone Hcl] Itching    Dye [Iodides] Hives and Shortness Of Breath    Keflex [Cephalexin] Itching and Rash    Acetaminophen     Bee Pollen     Lasix [Furosemide] Other (See Comments)     Pt states she cramps up and gets headaches    Levaquin [Levofloxacin In D5w] Hives    Lipitor      MUSCLE SPASMS    Lyrica [Pregabalin]      Dream disturbances    Morphine Hives    Naproxen      Unsure of reaction;pt states able to take Aleve without difficulties    Nefazodone Other (See Comments)    Norvasc [Amlodipine] Swelling Oxycodone-Acetaminophen Swelling    Shellfish-Derived Products     Trazodone And Nefazodone            Prior to Visit Medications    Medication Sig Taking? Authorizing Provider   hydrALAZINE (APRESOLINE) 100 MG tablet Take 1 tablet by mouth 3 times daily  Nancy Rao MD   oxyCODONE-acetaminophen (PERCOCET) 5-325 MG per tablet Take 1 tablet by mouth every 6 hours as needed for Pain for up to 3 days. Max Daily Amount: 4 tablets  India Hall PA-C   sacubitril-valsartan (ENTRESTO)  MG per tablet Take 1 tablet by mouth 2 times daily  Nancy Rao MD   carvedilol (COREG) 25 MG tablet TAKE 1 TABLET BY MOUTH TWICE DAILY WITH MEALS  Nancy Rao MD   bumetanide (BUMEX) 1 MG tablet TAKE 1 TABLET BY MOUTH DAILY  Patient not taking: Reported on 3/16/2023  Nancy Rao MD   dexlansoprazole (DEXILANT) 60 MG CPDR delayed release capsule TAKE 1 CAPSULE BY MOUTH EVERY DAY  Historical Provider, MD   famotidine (PEPCID) 40 MG tablet TAKE ONE TABLET DAILY 1/2 HOUR BEFORE DINNER  Historical Provider, MD   BD ULTRA-FINE PEN NEEDLES 29G X 12.7MM MISC USE AS DIRECTED 3 TIMES A DAY  Historical Provider, MD   SPIRIVA RESPIMAT 1.25 MCG/ACT AERS inhaler INHALE TWO (2) PUFFS BY MOUTH EVERY DAY  Historical Provider, MD   simvastatin (ZOCOR) 20 MG tablet Take 1 tablet by mouth nightly  Nancy Rao MD   isosorbide mononitrate (IMDUR) 60 MG extended release tablet Take 1 tablet by mouth daily  Nancy Rao MD   cetirizine (ZYRTEC) 10 MG tablet TAKE 1 TABLET BY MOUTH EVERY DAY  Santiago Diaz DO   oxybutynin (DITROPAN-XL) 10 MG extended release tablet Take 10 mg by mouth daily  Historical Provider, MD RACHEL -25 MCG/INH AEPB inhaler Inhale 1 puff into the lungs daily  Historical Provider, MD   YUPELRI 175 MCG/3ML SOLN Inhale 3 mLs into the lungs daily  Historical Provider, MD   fluticasone (FLONASE) 50 MCG/ACT nasal spray 2 sprays by Each Nostril route in the morning.   [facility], DO aspirin 81 MG chewable tablet Take 1 tablet by mouth daily  Tiffany Armenta MD   nitroGLYCERIN (NITROSTAT) 0.3 MG SL tablet Place 0.3 mg under the tongue every 5 minutes as needed for Chest pain up to max of 3 total doses. If no relief after 1 dose, call 911. Historical Provider, MD   DULoxetine (CYMBALTA) 60 MG extended release capsule Take 60 mg by mouth daily Patient only has supply to take once a day  Historical Provider, MD   gabapentin (NEURONTIN) 100 MG capsule Take 100 mg by mouth 2 times daily. Historical Provider, MD   Insulin Degludec (TRESIBA FLEXTOUCH) 200 UNIT/ML SOPN Inject 10 Units into the skin at bedtime  Historical Provider, MD   montelukast (SINGULAIR) 10 MG tablet Take 10 mg by mouth nightly  Historical Provider, MD   traZODone (DESYREL) 100 MG tablet Take 100 mg by mouth nightly  Historical Provider, MD   vitamin D (CHOLECALCIFEROL) 25 MCG (1000 UT) TABS tablet Take 1,000 Units by mouth daily  Historical Provider, MD   docusate sodium (COLACE) 100 MG capsule Take 200 mg by mouth at bedtime   Historical Provider, MD       GDMT:  Bumex PRN as needed for s/s chf  Coreg 25mg BID  Hydralazine 100mg TID  Isosorbide mono 60mg QD  Entresto 97-103mg BID  MRA- no  (stopped ALEC and hyperkalemia 12/2022)  [id number]    Physical Examination:     /69   Pulse 68   Resp 16   Ht 5' 2\" (1.575 m)   Wt 186 lb 6.4 oz (84.6 kg)   LMP 01/01/1990   SpO2 96%   BMI 34.09 kg/m²   Assessment  Charting Type: Shift assessment (AID DROVE. W/C TO CLINIC. NYHA CLASS II-III, DECONDITIONED.)    Neurological  Level of Consciousness: Alert (0)         HEENT (Head, Ears, Eyes, Nose, & Throat)  HEENT (WDL): Exceptions to WDL (HAS HAD HEADACHES SINCE BP NORMALIZED. USING TYLENOL.  NO VISION CHANGES OR DIZZINESS)    Respiratory  Respiratory Pattern: Regular  Respiratory Quality/Effort: Dyspnea with exertion  L Breath Sounds: Clear  R Breath Sounds: Clear              Cardiac  Cardiac Regularity: Regular  Heart Sounds: S1, S2  Cardiac Rhythm: Sinus rhythm    JVP not visualized. Short neck and rounded. Rhythm Interpretation  Additional Details: SINUS RHYTHM  Heart Rate: 68         Gastrointestinal  Abdominal (WDL): Within Defined Limits (ABD SOFTER AND LESS DISTENDED FROM LAST VISIT WITH WEIGHT LOSS. GOOD BM'S)  Abdomen Inspection: Rounded, Soft  RUQ Bowel Sounds: Active  LUQ Bowel Sounds: Active  RLQ Bowel Sounds: Active  LLQ Bowel Sounds: Active          Bowel Sounds  RUQ Bowel Sounds: Active  LUQ Bowel Sounds: Active  RLQ Bowel Sounds: Active  LLQ Bowel Sounds: Active    Peripheral Vascular  Peripheral Vascular (WDL): Within Defined Limits  Edema: None                   Genitourinary  Genitourinary (WDL): Exceptions to WDL (VOIDS GOOD.  URINE YELLOW GOOD VOLUME.)    Psychosocial  Psychosocial (WDL): Within Defined Limits    Pain Assessment  Pain Assessment: None - Denies Pain (no chest pain, palpitation, syncope or vertigo)  Pain Level: 0                   Heart Rate: 68              Urine Assessment  Urine Color: Yellow/straw      LAB DATA:    Last 3 BMP      Sodium (mmol/L)   Date Value   03/14/2023 138   01/10/2023 137   01/09/2023 138     Potassium (mmol/L)   Date Value   01/10/2023 4.2   01/09/2023 5.0     Potassium reflex Magnesium (mmol/L)   Date Value   03/14/2023 4.6   10/17/2022 4.8   10/12/2022 4.8     Chloride (mmol/L)   Date Value   03/14/2023 101   01/10/2023 102   01/09/2023 102     CO2 (mmol/L)   Date Value   03/14/2023 28   01/10/2023 26   01/09/2023 27     BUN (mg/dL)   Date Value   03/14/2023 22   01/10/2023 17   01/09/2023 19     Glucose (mg/dL)   Date Value   03/14/2023 152 (H)   01/10/2023 140 (H)   01/09/2023 173 (H)   12/01/2011 181 (H)   10/05/2011 133 (H)   08/21/2011 94     Calcium (mg/dL)   Date Value   03/14/2023 9.2   01/10/2023 9.0   01/09/2023 9.6       Last 3 BNP       Pro-BNP (pg/mL)   Date Value   03/14/2023 981 (H)   01/10/2023 1,173 (H)   01/09/2023 1,061 (H)          CBC:   Recent Labs 03/14/23  1224   WBC 6.2   HGB 13.5        BMP:    Recent Labs     03/14/23  1224      K 4.6      CO2 28   BUN 22   CREATININE 1.3*   GLUCOSE 152*         Hepatic:   Recent Labs     03/14/23  1224   AST 16   ALT 19   BILITOT 0.4   ALKPHOS 85     Troponin: No results for input(s): TROPONINI in the last 72 hours. BNP: No results for input(s): BNP in the last 72 hours. Lipids: No results for input(s): CHOL, HDL in the last 72 hours. Invalid input(s): LDLCALCU  INR: No results for input(s): INR in the last 72 hours. WEIGHTS:    Wt Readings from Last 3 Encounters:   03/16/23 186 lb 6.4 oz (84.6 kg)   03/14/23 190 lb (86.2 kg)   01/10/23 192 lb (87.1 kg)         TELEMETRY:  Cardiac Regularity: Regular  Cardiac Rhythm/Interpretation: NSR        ASSESSMENT:  MIRACLE [place] is euvolemic with stable weights. Wt was 240 last year. She is no longer on a daily loop diuretic since seeing Dr. Yousuf Reed where it was changed to prn only for worsening signs and symptoms of CHF.  3-13-23 DR Yousuf Reed few days ago increased entresto to high dose. She was having hypertension at home. On 3/14/23 went to ER with CP and took a nitro. Cardiologist- [facility] consulted and evaluated. Released to home. Continues with symptoms of a headache in the absence of focal neurologic symptomatology. Taking Tylenol for this. Interventions completed this visit:  IV diuretics given-no  Lab work obtained NO. She has requested to do outpatient Nephrology - Dr Terry Mcclain today (CMP included in that labs). She will go register and complete labs - she is IV team draw due to difficult stick ongoing.    Reviewed currently prescribed medications with patient, educated on importance of compliance and answered any questions regarding their medication  Reinforced signs and symptoms of HF (HF Zones use)  Reinforced low sodium diet  Educated on tobacco cessation  Educated on getting fit while you sit exercises and importance of 30 min most days of the week exercise. Reinforced use of use of her noninvasive ventilator   2-24-23 for CT abd/pelvis  Follow with DR Hunter for his lab results and HTN management. (BP today wnl)  (patient stopped back at [facility] [place] [facility], she needs to be fasting for DR Hunter labs, she will return tomorrow to lab). PLAN:  Scheduled to follow up in CHF clinic on   Future Appointments   Date Time Provider Melanie Sellers   [facility]24[facility]2023 11:00 [facility]   5/16/2023 12:45 PM [facility] ROOM 1 [facility]   7/6/2023 10:30 AM Elsie Graham MD [facility]     Given clinic phone number [phone] and aware of signs and symptoms to call with any HF change in symptoms.

## 2023-03-16 NOTE — DISCHARGE INSTRUCTIONS
FOLLOW HEART FAILURE ZONES DAILY  DR NICKY LAGUNAS , REGISTER IN REGISTRATION THEN GO TO LAB FOR BLOOD DRAW. BUY BATTERIES FOR YOUR SCALE AND START WEIGHING SELF DAILY AND RECORDING DAILY WEIGHTS                  Learning About Heart Failure Zones  What are heart failure zones? Heart failure zones give you an easy way to see changes in your heart failure symptoms. They also tell you when you need to get help. Check every day to see which zone you are in. Green zone. You are doing well. This is where you want to be. Your weight is stable. It's not going up or down. You breathe easily. You are sleeping well. You are able to lie flat without shortness of breath. You can do your usual activities. Yellow zone. Be careful. Your symptoms are changing. Call your doctor. You have new or increased shortness of breath. You are dizzy or lightheaded, or you feel like you may faint. You have sudden weight gain, such as more than 2 to 3 pounds in a day or 5 pounds in a week. (Your doctor may suggest a different range of weight gain.)  You have increased swelling in your legs, ankles, or feet. You are so tired or weak that you can't do your usual activities. You are not sleeping well. Shortness of breath wakes you up at night. You need extra pillows. Red zone. This is an emergency. Call 911. You have symptoms of sudden heart failure. For example: You have severe trouble breathing. You cough up pink, foamy mucus. You have a new irregular or fast heartbeat. You have symptoms of a heart attack. These may include:  Chest pain or pressure, or a strange feeling in the chest.  Sweating. Shortness of breath. Nausea or vomiting. Pain, pressure, or a strange feeling in the back, neck, jaw, or upper belly or in one or both shoulders or arms. Lightheadedness or sudden weakness. A fast or irregular heartbeat.   If you have symptoms of a heart attack: After you call 911, the  may tell you to chew 1 adult-strength or 2 to 4 low-dose aspirin. Wait for an ambulance. Do not try to drive yourself.  Follow-up care is a key part of your treatment and safety. Be sure to make and go to all appointments, and call your doctor if you are having problems. It's also a good idea to know your test results and keep a list of the medicines you take.  Where can you learn more?  Go to https://www.HelpMeRent.com.net/patientEd and enter T174 to learn more about \"Learning About Heart Failure Zones.\"  Current as of: September 7, 2022               Content Version: 13.5  © 2006-2022 [employer].   Care instructions adapted under license by [employer]. If you have questions about a medical condition or this instruction, always ask your healthcare professional. [employer] disclaims any warranty or liability for your use of this information.       Heart Failure: Checking Your Weight Daily (01:34)  Your health professional recommends that you watch this short online health video.  Understand how daily weight checks help you avoid heart failure emergencies.  Purpose:  Teaches why daily weight checks are important for avoiding heart failure emergencies. Includes tips for checking weight.  Goal:  User will learn the importance of daily weight checks to avoid sudden heart failure.     How to watch the video    Scan the QR code   OR Visit the website  https://Futureware Inci.se/r/[email or web address]   Current as of: September 7, 2022               Content Version: 13.5  © 2006-2022 [employer].   Care instructions adapted under license by [employer]. If you have questions about a medical condition or this instruction, always ask your healthcare professional. [employer] disclaims any warranty or liability for your use of this information.          Avoiding Triggers for Sudden Heart Failure (01:44)  Your health professional recommends that you watch this short online health video.  Learn how to avoid things that could make your  heart failure worse. Purpose:  Teaches how to avoid triggers, such as sodium and medication interactions, that can cause heart failure emergencies. Goal:  User will learn how to avoid triggers for sudden heart failure, such as sodium and certain medications. How to watch the video    Scan the QR code   OR Visit the website  https://hwi. se/r/Sh8yy4zyrbh4k   Current as of: September 7, 2022               Content Version: 13.5  © 2006-2022 [employer]. Care instructions adapted under license by [facility] ([facility]). If you have questions about a medical condition or this instruction, always ask your healthcare professional. [address] any warranty or liability for your use of this information. Advance Care Planning for Heart Failure: Care Instructions  Your Care Instructions     If you have heart failure, taking care of yourself will help you feel better and live longer. But the disease often gets worse over time. So it may be a good idea to plan for the future now while you are active and able to communicate your wishes. If you do this kind of planning, it does not mean that you are giving up. It's simply the best way to make sure that you get the care and treatment that you want. It can also make things much easier for your loved ones. What can you do to plan for the end of life? Talk openly and honestly with your family and doctor. This is the best way to understand the decisions you will need to make as your health changes. Know that you can always change your mind. Ask your doctor about common life-support treatments. These include tube feedings, breathing machines, and fluids given through a vein (IV). It may be easier to decide if you want them after you are sure you understand what they are and how they may help you. Think about preparing written papers that state your wishes. These papers are called advance directives.  If you do this early and review them often, there will not be any confusion about your wishes. You can change your instructions at any time. If you are near the end of your life and you have a heart device such as an implantable cardioverter defibrillator (ICD) or pacemaker, talk to your doctor about turning it off. Include this in your advance directive. Ask a friend or family member to make decisions for you when you no longer can. Choose someone who knows you well and understands what makes life meaningful for you. Talk to this person about the kinds of treatments you want or don't want. Make sure this person understands your values and wishes. You may decide to try life-supporting treatments for a limited time. This can help your doctor see if they will help. You may also decide that you want your doctor to do only certain things to keep you alive. It may help to think about the big picture, like what makes life worth living for you or what your values and goals are. Ask your doctor for an estimate of how long you may live. Your doctor may not always know. But your doctor can tell you what usually happens with heart failure. Which papers should you prepare? If you have heart failure, you may find it hard to think about a time when you might not be able to care for yourself. But since heart failure tends to get worse over time, it's important to make a plan for the care you want later. An advance directive can help you do this. Advance directives are legal papers that tell doctors how to care for you at the end of your life. They may include a living will and a durable power of . You don't need a  to write these papers. If you prepare these papers, be sure to give a copy to your doctor. It's also important to give a copy to a family member or close friend. Consider a do-not-resuscitate order (DNR). This order asks that no extra treatments be done if your heart stops or you stop breathing.  Extra treatments may include cardiopulmonary resuscitation (CPR), electrical shock to restart your heart, or a machine to breathe for you. If you decide to have a DNR order, ask your doctor to explain and write it. Place the order in your home where everyone can easily see it. Think about a living will. It explains your wishes in case you are in a coma or can't communicate. Living [place] tell doctors to use or not use treatments to keep you alive. You must have one or two witnesses or a notary in the room when you sign this form. Think about naming a person to make decisions about your care if you aren't able to. This paper is called a durable power of . Most people ask a close friend or family member. Ask your doctor or your state health department about advance directives. They may have forms for each of these types of papers. All of these papers are simple to change. Tell your doctor what you want to change. Ask him or her to make a note in your file. Then give your family updated copies. Where can you learn more? Go to http://www.woods.com/ and enter L823 to learn more about \"Advance Care Planning for Heart Failure: Care Instructions. \"  Current as of: October 6, 2021               Content Version: 13.5  © 2006-2022 [employer]. Care instructions adapted under license by [facility] ([facility]). If you have questions about a medical condition or this instruction, always ask your healthcare professional. [place] [address] any warranty or liability for your use of this information.

## 2023-03-18 ENCOUNTER — HOSPITAL ENCOUNTER (OUTPATIENT)
Age: 68
Discharge: HOME | End: 2023-03-18
Payer: MEDICARE

## 2023-03-18 LAB
ALBUMIN SERPL-MCNC: 4.1 G/DL (ref 3.5–5.2)
ALP SERPL-CCNC: 87 U/L (ref 35–104)
ALT SERPL-CCNC: 18 U/L (ref 0–32)
ANION GAP SERPL CALCULATED.3IONS-SCNC: 14 MMOL/L (ref 7–16)
AST SERPL-CCNC: 13 U/L (ref 0–31)
BASOPHILS # BLD: 0.03 E9/L (ref 0–0.2)
BASOPHILS NFR BLD: 0.4 % (ref 0–2)
BILIRUB SERPL-MCNC: 0.3 MG/DL (ref 0–1.2)
BUN SERPL-MCNC: 29 MG/DL (ref 6–23)
CALCIUM SERPL-MCNC: 9.5 MG/DL (ref 8.6–10.2)
CHLORIDE SERPL-SCNC: 101 MMOL/L (ref 98–107)
CHOLESTEROL, TOTAL: 138 MG/DL (ref 0–199)
CO2 SERPL-SCNC: 25 MMOL/L (ref 22–29)
CREAT SERPL-MCNC: 1.3 MG/DL (ref 0.5–1)
CREAT UR-MCNC: 110 MG/DL (ref 29–226)
EOSINOPHIL # BLD: 0.17 E9/L (ref 0.05–0.5)
EOSINOPHIL NFR BLD: 2.3 % (ref 0–6)
ERYTHROCYTE [DISTWIDTH] IN BLOOD BY AUTOMATED COUNT: 11.9 FL (ref 11.5–15)
GLUCOSE SERPL-MCNC: 150 MG/DL (ref 74–99)
HBA1C MFR BLD: 6.3 % (ref 4–5.6)
HCT VFR BLD AUTO: 41.8 % (ref 34–48)
HDLC SERPL-MCNC: 32 MG/DL
HGB BLD-MCNC: 14.1 G/DL (ref 11.5–15.5)
IMM GRANULOCYTES # BLD: 0.01 E9/L
IMM GRANULOCYTES NFR BLD: 0.1 % (ref 0–5)
LDLC SERPL CALC-MCNC: 70 MG/DL (ref 0–99)
LYMPHOCYTES # BLD: 1.49 E9/L (ref 1.5–4)
LYMPHOCYTES NFR BLD: 20.1 % (ref 20–42)
MCH RBC QN AUTO: 32.7 PG (ref 26–35)
MCHC RBC AUTO-ENTMCNC: 33.7 % (ref 32–34.5)
MCV RBC AUTO: 97 FL (ref 80–99.9)
MICROALBUMIN UR-MCNC: 973.6 MG/L
MICROALBUMIN/CREAT UR-RTO: 885.1 (ref 0–30)
MONOCYTES # BLD: 0.3 E9/L (ref 0.1–0.95)
MONOCYTES NFR BLD: 4.1 % (ref 2–12)
NEUTROPHILS # BLD: 5.4 E9/L (ref 1.8–7.3)
NEUTS SEG NFR BLD: 73 % (ref 43–80)
PHOSPHATE SERPL-MCNC: 3.2 MG/DL (ref 2.5–4.5)
PLATELET # BLD AUTO: 238 E9/L (ref 130–450)
PMV BLD AUTO: 10.5 FL (ref 7–12)
POTASSIUM SERPL-SCNC: 4.2 MMOL/L (ref 3.5–5)
PROT SERPL-MCNC: 7.1 G/DL (ref 6.4–8.3)
PTH-INTACT SERPL-MCNC: 48 PG/ML (ref 15–65)
RBC # BLD AUTO: 4.31 E12/L (ref 3.5–5.5)
SODIUM SERPL-SCNC: 140 MMOL/L (ref 132–146)
TRIGL SERPL-MCNC: 180 MG/DL (ref 0–149)
TSH SERPL-MCNC: 0.52 UIU/ML (ref 0.27–4.2)
VITAMIN D 25-HYDROXY: 38 NG/ML (ref 30–100)
VLDLC SERPL CALC-MCNC: 36 MG/DL
WBC # BLD: 7.4 E9/L (ref 4.5–11.5)

## 2023-03-18 PROCEDURE — 36415 COLL VENOUS BLD VENIPUNCTURE: CPT

## 2023-03-18 PROCEDURE — 82570 ASSAY OF URINE CREATININE: CPT

## 2023-03-18 PROCEDURE — 83835 ASSAY OF METANEPHRINES: CPT

## 2023-03-18 PROCEDURE — 84100 ASSAY OF PHOSPHORUS: CPT

## 2023-03-18 PROCEDURE — 80061 LIPID PANEL: CPT

## 2023-03-18 PROCEDURE — 83970 ASSAY OF PARATHORMONE: CPT

## 2023-03-18 PROCEDURE — 84244 ASSAY OF RENIN: CPT

## 2023-03-18 PROCEDURE — 82088 ASSAY OF ALDOSTERONE: CPT

## 2023-03-18 PROCEDURE — 85025 COMPLETE CBC W/AUTO DIFF WBC: CPT

## 2023-03-18 PROCEDURE — 82044 UR ALBUMIN SEMIQUANTITATIVE: CPT

## 2023-03-18 PROCEDURE — 82306 VITAMIN D 25 HYDROXY: CPT

## 2023-03-18 PROCEDURE — 83036 HEMOGLOBIN GLYCOSYLATED A1C: CPT

## 2023-03-18 PROCEDURE — 84443 ASSAY THYROID STIM HORMONE: CPT

## 2023-03-18 PROCEDURE — 80053 COMPREHEN METABOLIC PANEL: CPT

## 2023-03-18 PROCEDURE — 82384 ASSAY THREE CATECHOLAMINES: CPT

## 2023-03-19 LAB
COLLECT DURATION TIME SPEC: 24 H
SPECIMEN VOL ?TM UR: 1150 ML

## 2023-03-21 LAB — ALDOST SERPL-MCNC: 7 NG/DL

## 2023-03-22 LAB — RENIN PLAS-CCNC: 6.9 NG/ML/HR

## 2023-03-23 ENCOUNTER — HOSPITAL ENCOUNTER (OUTPATIENT)
Dept: ULTRASOUND IMAGING | Age: 68
Discharge: HOME | End: 2023-03-25
Payer: MEDICARE

## 2023-03-23 DIAGNOSIS — E87.3 METABOLIC ALKALOSIS: ICD-10-CM

## 2023-03-23 DIAGNOSIS — E11.22 TYPE 2 DIABETES MELLITUS WITH DIABETIC CHRONIC KIDNEY DISEASE, UNSPECIFIED CKD STAGE, UNSPECIFIED WHETHER LONG TERM INSULIN USE (HCC): ICD-10-CM

## 2023-03-23 DIAGNOSIS — N18.31 CKD STAGE G3A/A3, GFR 45-59 AND ALBUMIN CREATININE RATIO >300 MG/G (HCC): ICD-10-CM

## 2023-03-23 DIAGNOSIS — I12.9 HYPERTENSIVE CHRONIC KIDNEY DISEASE WITH STAGE 1 THROUGH STAGE 4 CHRONIC KIDNEY DISEASE, OR UNSPECIFIED CHRONIC KIDNEY DISEASE: ICD-10-CM

## 2023-03-23 DIAGNOSIS — E87.5 HYPERKALEMIA: ICD-10-CM

## 2023-03-23 DIAGNOSIS — I10 RESISTANT HYPERTENSION: ICD-10-CM

## 2023-03-23 DIAGNOSIS — N18.31 STAGE 3A CHRONIC KIDNEY DISEASE (HCC): ICD-10-CM

## 2023-03-23 LAB
METANEPH 24H UR-MCNC: 134 UG/L
METANEPH 24H UR-MRATE: 154 UG/D (ref 36–229)
METANEPH+NORMETANEPH UR-IMP: NORMAL
METANEPHRINE UG/G CRE: 158 UG/G CRT (ref 0–300)
NORMETANEPHRINE 24H UR-MCNC: 311 UG/L
NORMETANEPHRINE 24H UR-MRATE: 358 UG/D (ref 95–650)
NORMETANEPHRINE, (G/CRT): 366 UG/G CRT (ref 0–400)

## 2023-03-23 PROCEDURE — 76770 US EXAM ABDO BACK WALL COMP: CPT

## 2023-03-23 PROCEDURE — 93975 VASCULAR STUDY: CPT

## 2023-03-24 LAB
CATECHOLS UR-IMP: NORMAL
COLLECT DURATION TIME SPEC: 24 H
CREAT 24H UR-MCNC: 85 MG/DL
CREAT 24H UR-MRATE: 978 MG/D (ref 500–1400)
DOPAMINE 24H UR-MRATE: 138 UG/D (ref 71–485)
DOPAMINE UR-MCNC: 120 UG/L
DOPAMINE/CREAT UR: 141 UG/G CRT (ref 0–250)
EPINEPH 24H UR-MRATE: 3 UG/D (ref 1–14)
EPINEPH UR-MCNC: 3 UG/L
EPINEPH/CREAT UR: 4 UG/G CRT (ref 0–20)
NOREPINEPH 24H UR-MRATE: 26 UG/D (ref 14–120)
NOREPINEPH UR-MCNC: 23 UG/L
NOREPINEPH/CREAT UR: 27 UG/G CRT (ref 0–45)
SPECIMEN VOL ?TM UR: 1150 ML

## 2023-04-03 ENCOUNTER — TELEPHONE (OUTPATIENT)
Dept: CARDIOLOGY CLINIC | Age: 68
End: 2023-04-03

## 2023-04-04 NOTE — TELEPHONE ENCOUNTER
D/w her and she does not have pulmonary hypertension, she was advised to follow up with me on the next available date.

## 2023-04-06 RX ORDER — HYDRALAZINE HYDROCHLORIDE 100 MG/1
TABLET, FILM COATED ORAL
Qty: 90 TABLET | Refills: 11 | OUTPATIENT
Start: 2023-04-06

## 2023-04-29 DIAGNOSIS — J30.1 SEASONAL ALLERGIC RHINITIS DUE TO POLLEN: Primary | ICD-10-CM

## 2023-05-02 RX ORDER — CETIRIZINE HYDROCHLORIDE 10 MG/1
TABLET ORAL
Qty: 90 TABLET | Refills: 5 | Status: SHIPPED | OUTPATIENT
Start: 2023-05-02

## 2023-05-04 DIAGNOSIS — J30.1 SEASONAL ALLERGIC RHINITIS DUE TO POLLEN: Primary | ICD-10-CM

## 2023-05-05 RX ORDER — FLUTICASONE PROPIONATE 50 MCG
SPRAY, SUSPENSION (ML) NASAL
Qty: 1 EACH | Refills: 5 | Status: SHIPPED | OUTPATIENT
Start: 2023-05-05

## 2023-05-16 ENCOUNTER — HOSPITAL ENCOUNTER (OUTPATIENT)
Age: 68
Discharge: HOME | End: 2023-05-16
Payer: MEDICARE

## 2023-05-16 ENCOUNTER — HOSPITAL ENCOUNTER (OUTPATIENT)
Dept: OTHER | Age: 68
Setting detail: THERAPIES SERIES
Discharge: HOME | End: 2023-05-16
Payer: MEDICARE

## 2023-05-16 VITALS
BODY MASS INDEX: 33.49 KG/M2 | SYSTOLIC BLOOD PRESSURE: 161 MMHG | RESPIRATION RATE: 18 BRPM | DIASTOLIC BLOOD PRESSURE: 72 MMHG | OXYGEN SATURATION: 92 % | HEIGHT: 62 IN | HEART RATE: 66 BPM | WEIGHT: 182 LBS

## 2023-05-16 LAB
ANION GAP SERPL CALCULATED.3IONS-SCNC: 10 MMOL/L (ref 7–16)
ANION GAP SERPL CALCULATED.3IONS-SCNC: 10 MMOL/L (ref 7–16)
BACTERIA URNS QL MICRO: ABNORMAL /HPF
BASOPHILS # BLD: 0.02 E9/L (ref 0–0.2)
BASOPHILS NFR BLD: 0.2 % (ref 0–2)
BILIRUB UR QL STRIP: NEGATIVE
BNP BLD-MCNC: 1609 PG/ML (ref 0–125)
BUN SERPL-MCNC: 21 MG/DL (ref 6–23)
BUN SERPL-MCNC: 21 MG/DL (ref 6–23)
CALCIUM SERPL-MCNC: 9.4 MG/DL (ref 8.6–10.2)
CALCIUM SERPL-MCNC: 9.5 MG/DL (ref 8.6–10.2)
CHLORIDE SERPL-SCNC: 102 MMOL/L (ref 98–107)
CHLORIDE SERPL-SCNC: 103 MMOL/L (ref 98–107)
CLARITY UR: ABNORMAL
CO2 SERPL-SCNC: 24 MMOL/L (ref 22–29)
CO2 SERPL-SCNC: 25 MMOL/L (ref 22–29)
COLOR UR: YELLOW
CREAT SERPL-MCNC: 1.2 MG/DL (ref 0.5–1)
CREAT SERPL-MCNC: 1.3 MG/DL (ref 0.5–1)
CREAT UR-MCNC: 104 MG/DL (ref 29–226)
EOSINOPHIL # BLD: 0.13 E9/L (ref 0.05–0.5)
EOSINOPHIL NFR BLD: 1.2 % (ref 0–6)
ERYTHROCYTE [DISTWIDTH] IN BLOOD BY AUTOMATED COUNT: 11.9 FL (ref 11.5–15)
GLUCOSE SERPL-MCNC: 165 MG/DL (ref 74–99)
GLUCOSE SERPL-MCNC: 167 MG/DL (ref 74–99)
GLUCOSE UR STRIP-MCNC: NEGATIVE MG/DL
HCT VFR BLD AUTO: 44.6 % (ref 34–48)
HGB BLD-MCNC: 14.1 G/DL (ref 11.5–15.5)
HGB UR QL STRIP: NEGATIVE
IMM GRANULOCYTES # BLD: 0.03 E9/L
IMM GRANULOCYTES NFR BLD: 0.3 % (ref 0–5)
KETONES UR STRIP-MCNC: NEGATIVE MG/DL
LEUKOCYTE ESTERASE UR QL STRIP: ABNORMAL
LYMPHOCYTES # BLD: 1.17 E9/L (ref 1.5–4)
LYMPHOCYTES NFR BLD: 11.1 % (ref 20–42)
MAGNESIUM SERPL-MCNC: 1.7 MG/DL (ref 1.6–2.6)
MCH RBC QN AUTO: 31.9 PG (ref 26–35)
MCHC RBC AUTO-ENTMCNC: 31.6 % (ref 32–34.5)
MCV RBC AUTO: 100.9 FL (ref 80–99.9)
MICROALBUMIN UR-MCNC: 280.3 MG/L
MICROALBUMIN/CREAT UR-RTO: 269.5 (ref 0–30)
MONOCYTES # BLD: 0.35 E9/L (ref 0.1–0.95)
MONOCYTES NFR BLD: 3.3 % (ref 2–12)
NEUTROPHILS # BLD: 8.8 E9/L (ref 1.8–7.3)
NEUTS SEG NFR BLD: 83.9 % (ref 43–80)
NITRITE UR QL STRIP: POSITIVE
PH UR STRIP: 5.5 [PH] (ref 5–9)
PHOSPHATE SERPL-MCNC: 2.9 MG/DL (ref 2.5–4.5)
PLATELET # BLD AUTO: 244 E9/L (ref 130–450)
PMV BLD AUTO: 10.6 FL (ref 7–12)
POTASSIUM SERPL-SCNC: 4.1 MMOL/L (ref 3.5–5)
POTASSIUM SERPL-SCNC: 4.2 MMOL/L (ref 3.5–5)
PROT UR STRIP-MCNC: 30 MG/DL
PTH-INTACT SERPL-MCNC: 48 PG/ML (ref 15–65)
RBC # BLD AUTO: 4.42 E12/L (ref 3.5–5.5)
RBC #/AREA URNS HPF: ABNORMAL /HPF (ref 0–2)
SODIUM SERPL-SCNC: 137 MMOL/L (ref 132–146)
SODIUM SERPL-SCNC: 137 MMOL/L (ref 132–146)
SP GR UR STRIP: 1.02 (ref 1–1.03)
URATE SERPL-MCNC: 4.9 MG/DL (ref 2.4–5.7)
UROBILINOGEN UR STRIP-ACNC: 0.2 E.U./DL
WBC # BLD: 10.5 E9/L (ref 4.5–11.5)
WBC #/AREA URNS HPF: ABNORMAL /HPF (ref 0–5)

## 2023-05-16 PROCEDURE — 83970 ASSAY OF PARATHORMONE: CPT

## 2023-05-16 PROCEDURE — 81001 URINALYSIS AUTO W/SCOPE: CPT

## 2023-05-16 PROCEDURE — 82044 UR ALBUMIN SEMIQUANTITATIVE: CPT

## 2023-05-16 PROCEDURE — 80048 BASIC METABOLIC PNL TOTAL CA: CPT

## 2023-05-16 PROCEDURE — 36415 COLL VENOUS BLD VENIPUNCTURE: CPT

## 2023-05-16 PROCEDURE — 85025 COMPLETE CBC W/AUTO DIFF WBC: CPT

## 2023-05-16 PROCEDURE — 84550 ASSAY OF BLOOD/URIC ACID: CPT

## 2023-05-16 PROCEDURE — 99214 OFFICE O/P EST MOD 30 MIN: CPT

## 2023-05-16 PROCEDURE — 83880 ASSAY OF NATRIURETIC PEPTIDE: CPT

## 2023-05-16 PROCEDURE — 82570 ASSAY OF URINE CREATININE: CPT

## 2023-05-16 PROCEDURE — 84100 ASSAY OF PHOSPHORUS: CPT

## 2023-05-16 PROCEDURE — 83735 ASSAY OF MAGNESIUM: CPT

## 2023-05-16 RX ORDER — BUDESONIDE AND FORMOTEROL FUMARATE DIHYDRATE 160; 4.5 UG/1; UG/1
2 AEROSOL RESPIRATORY (INHALATION) 2 TIMES DAILY
COMMUNITY

## 2023-05-16 RX ORDER — BACLOFEN 10 MG/1
10 TABLET ORAL 2 TIMES DAILY
COMMUNITY

## 2023-05-16 RX ORDER — MELOXICAM 7.5 MG/1
7.5 TABLET ORAL DAILY
COMMUNITY

## 2023-05-16 RX ORDER — ERGOCALCIFEROL 1.25 MG/1
50000 CAPSULE ORAL DAILY
COMMUNITY

## 2023-05-16 RX ORDER — M-VIT,TX,IRON,MINS/CALC/FOLIC 27MG-0.4MG
1 TABLET ORAL DAILY
COMMUNITY

## 2023-05-16 RX ORDER — DICYCLOMINE HYDROCHLORIDE 10 MG/1
10 CAPSULE ORAL
COMMUNITY

## 2023-05-16 RX ORDER — CLONIDINE HYDROCHLORIDE 0.3 MG/1
0.3 TABLET ORAL 3 TIMES DAILY
COMMUNITY

## 2023-05-16 NOTE — PROGRESS NOTES
Congestive Heart Failure [address]   1955          Referring Provider: Dr Yandel Edge  Primary Care Physician: Dr Hortensia Chandler  Cardiologist: Dr Kely Gomez  Nephrologist: Dr Hnuter        History of Present Illness:     Maureen Baca is a 75 y.o. female with a history of  Echo LVEF 50-55% on  4-13-22. ([address]) in 2011 CABG with history of severe LV dysfunction with EF 25% in 2013. Patient Story:  She does have chronic dyspnea with exertion, no shortness of breath at rest, or decline in overall functional capacity. She does not have orthopnea, PND, nocturnal cough or hemoptysis. She does have abdominal distention or bloating which is  chronic and improved this visit. Denies early satiety, anorexia/change in appetite. She currently is NOT on a daily loop oral diuretic, just PRN. She does not have lower extremity edema. She denies lightheadedness, dizziness. She denies palpitations, syncope or near syncope. She does not complain of chest pain, pressure, discomfort at todays visit. Was in ER with CP and HTN  and released 6hrs later ,labs noted from 3/14/13 ER visit. Says she complies with noninvasive ventilator use nightly. Hx covid 19 in April 2021. Cut back on tobacco to 2 ciggs daily  and marijuana use every 4 or 5 days.        Allergies   Allergen Reactions    Latex Hives    Bee Venom Anaphylaxis    Dilaudid [Hydromorphone Hcl] Itching    Dye [Iodides] Hives and Shortness Of Breath    Keflex [Cephalexin] Itching and Rash    Acetaminophen     Bee Pollen     Lasix [Furosemide] Other (See Comments)     Pt states she cramps up and gets headaches    Levaquin [Levofloxacin In D5w] Hives    Lipitor      MUSCLE SPASMS    Lyrica [Pregabalin]      Dream disturbances    Morphine Hives    Naproxen      Unsure of reaction;pt states able to take Aleve without difficulties    Nefazodone Other (See Comments)    Norvasc [Amlodipine] Swelling

## 2023-05-27 ENCOUNTER — APPOINTMENT (OUTPATIENT)
Dept: CT IMAGING | Age: 68
End: 2023-05-27
Payer: MEDICARE

## 2023-05-27 ENCOUNTER — HOSPITAL ENCOUNTER (EMERGENCY)
Age: 68
Discharge: HOME | End: 2023-05-27
Attending: STUDENT IN AN ORGANIZED HEALTH CARE EDUCATION/TRAINING PROGRAM
Payer: MEDICARE

## 2023-05-27 ENCOUNTER — APPOINTMENT (OUTPATIENT)
Dept: GENERAL RADIOLOGY | Age: 68
End: 2023-05-27
Payer: MEDICARE

## 2023-05-27 VITALS
SYSTOLIC BLOOD PRESSURE: 155 MMHG | RESPIRATION RATE: 17 BRPM | HEART RATE: 60 BPM | DIASTOLIC BLOOD PRESSURE: 70 MMHG | BODY MASS INDEX: 33.29 KG/M2 | TEMPERATURE: 98.9 F | WEIGHT: 182 LBS | OXYGEN SATURATION: 94 %

## 2023-05-27 DIAGNOSIS — S09.90XA CLOSED HEAD INJURY, INITIAL ENCOUNTER: Primary | ICD-10-CM

## 2023-05-27 DIAGNOSIS — T07.XXXA MULTIPLE CONTUSIONS: ICD-10-CM

## 2023-05-27 PROCEDURE — 71250 CT THORAX DX C-: CPT

## 2023-05-27 PROCEDURE — 73090 X-RAY EXAM OF FOREARM: CPT

## 2023-05-27 PROCEDURE — 73060 X-RAY EXAM OF HUMERUS: CPT

## 2023-05-27 PROCEDURE — 70450 CT HEAD/BRAIN W/O DYE: CPT

## 2023-05-27 PROCEDURE — 6360000002 HC RX W HCPCS: Performed by: STUDENT IN AN ORGANIZED HEALTH CARE EDUCATION/TRAINING PROGRAM

## 2023-05-27 PROCEDURE — 96372 THER/PROPH/DIAG INJ SC/IM: CPT

## 2023-05-27 PROCEDURE — 73130 X-RAY EXAM OF HAND: CPT

## 2023-05-27 PROCEDURE — 72125 CT NECK SPINE W/O DYE: CPT

## 2023-05-27 PROCEDURE — 6370000000 HC RX 637 (ALT 250 FOR IP): Performed by: STUDENT IN AN ORGANIZED HEALTH CARE EDUCATION/TRAINING PROGRAM

## 2023-05-27 PROCEDURE — 99284 EMERGENCY DEPT VISIT MOD MDM: CPT

## 2023-05-27 PROCEDURE — 72170 X-RAY EXAM OF PELVIS: CPT

## 2023-05-27 PROCEDURE — 70486 CT MAXILLOFACIAL W/O DYE: CPT

## 2023-05-27 PROCEDURE — 72131 CT LUMBAR SPINE W/O DYE: CPT

## 2023-05-27 PROCEDURE — 72128 CT CHEST SPINE W/O DYE: CPT

## 2023-05-27 RX ORDER — KETOROLAC TROMETHAMINE 30 MG/ML
30 INJECTION, SOLUTION INTRAMUSCULAR; INTRAVENOUS ONCE
Status: COMPLETED | OUTPATIENT
Start: 2023-05-27 | End: 2023-05-27

## 2023-05-27 RX ORDER — CYCLOBENZAPRINE HCL 10 MG
5 TABLET ORAL ONCE
Status: COMPLETED | OUTPATIENT
Start: 2023-05-27 | End: 2023-05-27

## 2023-05-27 RX ADMIN — KETOROLAC TROMETHAMINE 30 MG: 30 INJECTION, SOLUTION INTRAMUSCULAR; INTRAVENOUS at 14:47

## 2023-05-27 RX ADMIN — CYCLOBENZAPRINE 5 MG: 10 TABLET, FILM COATED ORAL at 14:46

## 2023-05-27 ASSESSMENT — PAIN DESCRIPTION - LOCATION
LOCATION: BACK
LOCATION: BACK

## 2023-05-27 ASSESSMENT — PAIN DESCRIPTION - ORIENTATION
ORIENTATION: LEFT;RIGHT;MID;UPPER
ORIENTATION: RIGHT;LEFT;MID;UPPER

## 2023-05-27 ASSESSMENT — ENCOUNTER SYMPTOMS
SORE THROAT: 0
NAUSEA: 0
ABDOMINAL PAIN: 0
BACK PAIN: 1
PHOTOPHOBIA: 0
SHORTNESS OF BREATH: 0
DIARRHEA: 0
COUGH: 0

## 2023-05-27 ASSESSMENT — PAIN DESCRIPTION - DESCRIPTORS
DESCRIPTORS: ACHING;DULL;DISCOMFORT
DESCRIPTORS: ACHING;DISCOMFORT;DULL

## 2023-05-27 ASSESSMENT — PAIN SCALES - GENERAL
PAINLEVEL_OUTOF10: 8
PAINLEVEL_OUTOF10: 8

## 2023-05-27 NOTE — DISCHARGE INSTRUCTIONS
3. Small hiatus hernia. Nonspecific soft tissue thickening in the esophagus. Follow-up endoscopy is suggested to exclude the possibility of esophagitis or  mass.

## 2023-05-27 NOTE — ED PROVIDER NOTES
HPI  75 y.o. female presenting for fall, diffuse body pain. Patient was leaving her doctor's office a week ago when her walker got out from her and she fell backwards. She did hit her head, does not think she lost consciousness. She was supposed to get x-rays, but did not. She complains of diffuse back pain, right-sided head pain, and bilateral arm pain. She has been taking extra strength Tylenol at home without relief.      --------------------------------------------- PAST HISTORY ---------------------------------------------  Past Medical History:  has a past medical history of Asthma, Atherosclerosis of native artery of left leg with rest pain ([address] CAD (coronary artery disease), Cellulitis and abscess of trunk, Cerebellar infarct (HCC), Chronic back pain, Chronic kidney disease, Chronic systolic congestive heart failure ([address] Chronic venous insufficiency, COPD (chronic obstructive pulmonary disease) ([address] COVID-19, Depression, Diabetes mellitus ([address] Diabetic neuropathy ([address] Diverticulosis, Fatty liver, Glaucoma, open angle, Hepatic encephalopathy ([address] Hiatal hernia, History of blood transfusion, Hyperlipidemia, Hyperplastic colon polyp, Hypertension, Incontinence, Liver mass, Morbid obesity ([address] Movement disorder, Myocardial infarction ([address] O2 dependent, Osteoarthritis, generalized, Pain in both lower legs, Peripheral vascular disease ([address] Pneumonia, Pulmonary edema, PVD (peripheral vascular disease) with claudication ([address] Sleep apnea, Status post peripheral artery angioplasty, Tobacco abuse, Tobacco abuse, Tobacco dependence, Tubular adenoma polyp of rectum, Urinary tract infection due to ESBL Klebsiella, and Ventral hernia. Past Surgical History:  has a past surgical history that includes knee surgery; Upper gastrointestinal endoscopy (12/20/12); Coronary artery bypass graft; Layered wound closure (Left, [id number]);  Echo Complete (10/9/2013); polysomnography (1/2016); liver biopsy

## 2023-06-28 ENCOUNTER — OFFICE VISIT (OUTPATIENT)
Dept: CARDIOLOGY CLINIC | Age: 68
End: 2023-06-28
Payer: MEDICARE

## 2023-06-28 VITALS
BODY MASS INDEX: 32.92 KG/M2 | SYSTOLIC BLOOD PRESSURE: 108 MMHG | HEIGHT: 62 IN | WEIGHT: 178.9 LBS | DIASTOLIC BLOOD PRESSURE: 70 MMHG | RESPIRATION RATE: 16 BRPM | HEART RATE: 68 BPM | OXYGEN SATURATION: 92 %

## 2023-06-28 DIAGNOSIS — I10 ESSENTIAL HYPERTENSION: Primary | ICD-10-CM

## 2023-06-28 PROCEDURE — G8399 PT W/DXA RESULTS DOCUMENT: HCPCS | Performed by: INTERNAL MEDICINE

## 2023-06-28 PROCEDURE — G8417 CALC BMI ABV UP PARAM F/U: HCPCS | Performed by: INTERNAL MEDICINE

## 2023-06-28 PROCEDURE — 3078F DIAST BP <80 MM HG: CPT | Performed by: INTERNAL MEDICINE

## 2023-06-28 PROCEDURE — 93000 ELECTROCARDIOGRAM COMPLETE: CPT | Performed by: INTERNAL MEDICINE

## 2023-06-28 PROCEDURE — 99214 OFFICE O/P EST MOD 30 MIN: CPT | Performed by: INTERNAL MEDICINE

## 2023-06-28 PROCEDURE — 3017F COLORECTAL CA SCREEN DOC REV: CPT | Performed by: INTERNAL MEDICINE

## 2023-06-28 PROCEDURE — 1090F PRES/ABSN URINE INCON ASSESS: CPT | Performed by: INTERNAL MEDICINE

## 2023-06-28 PROCEDURE — 4004F PT TOBACCO SCREEN RCVD TLK: CPT | Performed by: INTERNAL MEDICINE

## 2023-06-28 PROCEDURE — 1123F ACP DISCUSS/DSCN MKR DOCD: CPT | Performed by: INTERNAL MEDICINE

## 2023-06-28 PROCEDURE — G8427 DOCREV CUR MEDS BY ELIG CLIN: HCPCS | Performed by: INTERNAL MEDICINE

## 2023-06-28 PROCEDURE — 3074F SYST BP LT 130 MM HG: CPT | Performed by: INTERNAL MEDICINE

## 2023-06-28 RX ORDER — SIMVASTATIN 20 MG
20 TABLET ORAL NIGHTLY
Qty: 90 TABLET | Refills: 3 | Status: SHIPPED | OUTPATIENT
Start: 2023-06-28

## 2023-06-28 RX ORDER — HYDRALAZINE HYDROCHLORIDE 100 MG/1
100 TABLET, FILM COATED ORAL 3 TIMES DAILY
Qty: 270 TABLET | Refills: 3 | Status: SHIPPED | OUTPATIENT
Start: 2023-06-28

## 2023-06-28 RX ORDER — CARVEDILOL 25 MG/1
25 TABLET ORAL 2 TIMES DAILY WITH MEALS
Qty: 60 TABLET | Refills: 11 | Status: SHIPPED | OUTPATIENT
Start: 2023-06-28

## 2023-06-28 RX ORDER — TRIAMCINOLONE ACETONIDE 0.25 MG/G
OINTMENT TOPICAL
Qty: 20 G | Refills: 1 | Status: SHIPPED | OUTPATIENT
Start: 2023-06-28 | End: 2023-07-05

## 2023-06-28 RX ORDER — ISOSORBIDE MONONITRATE 60 MG/1
60 TABLET, EXTENDED RELEASE ORAL DAILY
Qty: 90 TABLET | Refills: 3 | Status: SHIPPED | OUTPATIENT
Start: 2023-06-28

## 2023-06-30 ENCOUNTER — TELEPHONE (OUTPATIENT)
Dept: CARDIOLOGY CLINIC | Age: 68
End: 2023-06-30

## 2023-06-30 DIAGNOSIS — M62.838 MUSCLE SPASM: Primary | ICD-10-CM

## 2023-06-30 DIAGNOSIS — I50.42 CHRONIC COMBINED SYSTOLIC AND DIASTOLIC HEART FAILURE (HCC): ICD-10-CM

## 2023-07-03 RX ORDER — HYDRALAZINE HYDROCHLORIDE 50 MG/1
50 TABLET, FILM COATED ORAL 3 TIMES DAILY
COMMUNITY

## 2023-07-06 ENCOUNTER — TELEPHONE (OUTPATIENT)
Dept: VASCULAR SURGERY | Age: 68
End: 2023-07-06

## 2023-07-06 ENCOUNTER — HOSPITAL ENCOUNTER (OUTPATIENT)
Age: 68
Discharge: HOME | End: 2023-07-06
Payer: MEDICARE

## 2023-07-06 ENCOUNTER — OFFICE VISIT (OUTPATIENT)
Dept: VASCULAR SURGERY | Age: 68
End: 2023-07-06
Payer: MEDICARE

## 2023-07-06 DIAGNOSIS — I73.9 PVD (PERIPHERAL VASCULAR DISEASE) WITH CLAUDICATION (HCC): Primary | ICD-10-CM

## 2023-07-06 DIAGNOSIS — I50.42 CHRONIC COMBINED SYSTOLIC AND DIASTOLIC HEART FAILURE (HCC): ICD-10-CM

## 2023-07-06 DIAGNOSIS — M62.838 MUSCLE SPASM: ICD-10-CM

## 2023-07-06 LAB
ANION GAP SERPL CALCULATED.3IONS-SCNC: 12 MMOL/L (ref 7–16)
BUN SERPL-MCNC: 30 MG/DL (ref 6–23)
CALCIUM SERPL-MCNC: 9.3 MG/DL (ref 8.6–10.2)
CHLORIDE SERPL-SCNC: 104 MMOL/L (ref 98–107)
CO2 SERPL-SCNC: 21 MMOL/L (ref 22–29)
CREAT SERPL-MCNC: 1.3 MG/DL (ref 0.5–1)
GLUCOSE SERPL-MCNC: 136 MG/DL (ref 74–99)
POTASSIUM SERPL-SCNC: 4.4 MMOL/L (ref 3.5–5)
SODIUM SERPL-SCNC: 137 MMOL/L (ref 132–146)

## 2023-07-06 PROCEDURE — 1123F ACP DISCUSS/DSCN MKR DOCD: CPT | Performed by: SURGERY

## 2023-07-06 PROCEDURE — 36415 COLL VENOUS BLD VENIPUNCTURE: CPT

## 2023-07-06 PROCEDURE — G8428 CUR MEDS NOT DOCUMENT: HCPCS | Performed by: SURGERY

## 2023-07-06 PROCEDURE — 3017F COLORECTAL CA SCREEN DOC REV: CPT | Performed by: SURGERY

## 2023-07-06 PROCEDURE — G8399 PT W/DXA RESULTS DOCUMENT: HCPCS | Performed by: SURGERY

## 2023-07-06 PROCEDURE — 1090F PRES/ABSN URINE INCON ASSESS: CPT | Performed by: SURGERY

## 2023-07-06 PROCEDURE — 99214 OFFICE O/P EST MOD 30 MIN: CPT | Performed by: SURGERY

## 2023-07-06 PROCEDURE — 4004F PT TOBACCO SCREEN RCVD TLK: CPT | Performed by: SURGERY

## 2023-07-06 PROCEDURE — 80048 BASIC METABOLIC PNL TOTAL CA: CPT

## 2023-07-06 PROCEDURE — G8417 CALC BMI ABV UP PARAM F/U: HCPCS | Performed by: SURGERY

## 2023-07-06 NOTE — PROGRESS NOTES
12/2011     DR. Williams Garcia,  follows Dr Reyes Place  11/15/2013    Dr Shama Beebe    COLONOSCOPY  12/23/2016    Dr Duran Prost adenoma & hyperplastic polyps, melanosis coli (repeat one year 12/2017)    CORONARY ARTERY BYPASS GRAFT      ECHO COMPLETE  10/9/2013         ENDOSCOPY, COLON, DIAGNOSTIC  04/18/2017    GALLBLADDER SURGERY      gallstones removed, [facility] 8/2017    HYSTERECTOMY (CERVIX STATUS UNKNOWN)      JOINT REPLACEMENT  2011    LEFT KNEE    KNEE SURGERY      left knee replacement    LARYNGOSCOPY N/A 4/4/2022    DIRECT LARYNGOSCOPY WITH BIOPSY performed by Ciara Garnica DO at [address] [place] [address] [id number]    LIVER BIOPSY  1/8/2016    [facility]    POLYSOMNOGRAPHY  1/2016    [facility]    UPPER GASTROINTESTINAL ENDOSCOPY  12/20/12    UPPER GASTROINTESTINAL ENDOSCOPY  12/23/2016    Dr Hanny Ceja GASTROINTESTINAL ENDOSCOPY  02/08/2017       Family History   Problem Relation Age of Onset    Cancer Mother     Asthma Father        Social History     Socioeconomic History    Marital status:      Spouse name: Not on file    Number of children: 2    Years of education: 15    Highest education level: Not on file   Occupational History    Occupation: disability   Tobacco Use    Smoking status: Every Day     Packs/day: 0.25     Years: 40.00     Pack years: 10.00     Types: Cigarettes     Start date: 8/10/1974    Smokeless tobacco: Never    Tobacco comments:     2 cigs daily.      Vaping Use    Vaping Use: Never used   Substance and Sexual Activity    Alcohol use: Not Currently     Comment: social. CAFFEINE:1 CUP COFFEE TWICE WEEKLY    Drug use: Yes     Types: Marijuana Billy Caputo)     Comment: 4 times per week    Sexual activity: Defer   Other Topics Concern    Not on file   Social History Narrative    Pt lives with brother in her house-pt has never driven a car and depends on transportation     Social Determinants of Health     Financial Resource

## 2023-07-06 NOTE — TELEPHONE ENCOUNTER
Notified patient of lower extremity arterial doppler study at [facility] on Friday, 7-14-23 at 11:00 am. [place] at 10:30 am.

## 2023-07-07 ENCOUNTER — TELEPHONE (OUTPATIENT)
Dept: CARDIOLOGY CLINIC | Age: 68
End: 2023-07-07

## 2023-07-10 ENCOUNTER — TELEPHONE (OUTPATIENT)
Dept: ENT CLINIC | Age: 68
End: 2023-07-10

## 2023-07-11 ENCOUNTER — HOSPITAL ENCOUNTER (OUTPATIENT)
Dept: OTHER | Age: 68
Setting detail: THERAPIES SERIES
Discharge: HOME | End: 2023-07-11

## 2023-07-20 ENCOUNTER — HOSPITAL ENCOUNTER (OUTPATIENT)
Dept: INTERVENTIONAL RADIOLOGY/VASCULAR | Age: 68
Discharge: HOME | End: 2023-07-22
Attending: SURGERY
Payer: MEDICARE

## 2023-07-20 DIAGNOSIS — I73.9 PVD (PERIPHERAL VASCULAR DISEASE) WITH CLAUDICATION (HCC): ICD-10-CM

## 2023-07-20 PROCEDURE — 93923 UPR/LXTR ART STDY 3+ LVLS: CPT

## 2023-07-21 NOTE — PROGRESS NOTES
Physical Therapy    Facility/Department: [facility] MED SURG  Initial Assessment    NAME: Yas Mayorga  : 1955  MRN: [id number]    Date of Service: 2019       REQUIRES PT FOLLOW UP: Yes       Patient Diagnosis(es): The primary encounter diagnosis was Pneumonia due to organism. A diagnosis of COPD exacerbation ([address] was also pertinent to this visit. has a past medical history of Asthma, Atherosclerosis of native artery of left leg with rest pain ([address] C. difficile diarrhea, CAD (coronary artery disease), Cellulitis and abscess of trunk, Cerebellar infarct (HCC), Chronic back pain, Chronic kidney disease, Chronic systolic congestive heart failure ([address] Chronic venous insufficiency, COPD (chronic obstructive pulmonary disease) ([address] Depression, Diabetes mellitus ([address] Diabetic neuropathy ([address] Diverticulosis, Fatty liver, Glaucoma, open angle, Hepatic encephalopathy ([address] Hiatal hernia, History of blood transfusion, Hyperlipidemia, Hyperplastic colon polyp, Hypertension, Incontinence, Liver mass, Morbid obesity ([address] Movement disorder, Myocardial infarction ([address] Osteoarthritis, generalized, Pain in both lower legs, Peripheral vascular disease ([address] Pneumonia, Pulmonary edema, PVD (peripheral vascular disease) with claudication ([address] Sleep apnea, Tobacco abuse, Tobacco abuse, Tubular adenoma polyp of rectum, Urinary tract infection due to ESBL Klebsiella, and Ventral hernia. has a past surgical history that includes knee surgery; Upper gastrointestinal endoscopy (12); Coronary artery bypass graft; layer wound closure (Left, [id number]); Echo Complete (10/9/2013); polysomnography (2016); liver biopsy (2016); bronchial brush biopsy (2013); Breast surgery (); Cholecystectomy (YRS AGO); Cardiac surgery (2011); Cardiac catheterization (2015); Hysterectomy; joint replacement (); Upper gastrointestinal endoscopy (2016); Colonoscopy (11/15/2013);  Colonoscopy Exercises and functional mobility practice will be used as well as appropriate assistive devices or modalities to obtain goals.  Patient and family education will also be administered as needed.     Frequency of treatments will be 2-3x/week x 3-5 days.     Time in: 0944   Time out:  [facility] [address]   PT 8706     spouse

## 2023-07-24 ENCOUNTER — TELEPHONE (OUTPATIENT)
Dept: VASCULAR SURGERY | Age: 68
End: 2023-07-24

## 2023-07-24 RX ORDER — ASPIRIN 81 MG/1
81 TABLET ORAL DAILY
Qty: 30 TABLET | Refills: 3
Start: 2023-07-24 | End: 2023-08-23

## 2023-07-24 NOTE — TELEPHONE ENCOUNTER
Discussed with the patient regarding her lower extremity artery Doppler study, stable bilateral femoral-popliteal arterial occlusive disease, with an ankle brachial index of 0.68 on the right and 0.67 on the left, with adequate flow to both feet based upon the pulse volume recordings of the metatarsals    Patient has constant pain in the leg, from the hips to the feet, feel that because of history of osteoarthritis of the hips, back problems, diabetic neuropathy, that most likely she has majority of symptoms coming from musculoskeletal and neurological etiology, to discuss with her PCP regarding work-up of the same    Patient tells me she is already taking 81 mg Ecotrin daily    A trial of Pletal was deferred because of history of congestive heart failure    Patient was instructed call me as needed if the symptoms worsen at that time angiography may need to be considered if necessary    All the questions were answered

## 2023-07-26 ENCOUNTER — HOSPITAL ENCOUNTER (OUTPATIENT)
Dept: OTHER | Age: 68
Setting detail: THERAPIES SERIES
Discharge: HOME | End: 2023-07-26

## 2023-08-08 ENCOUNTER — HOSPITAL ENCOUNTER (OUTPATIENT)
Dept: OTHER | Age: 68
Setting detail: THERAPIES SERIES
Discharge: HOME | End: 2023-08-08

## 2023-08-08 DIAGNOSIS — J30.1 SEASONAL ALLERGIC RHINITIS DUE TO POLLEN: Primary | ICD-10-CM

## 2023-08-09 RX ORDER — FLUTICASONE PROPIONATE 50 MCG
SPRAY, SUSPENSION (ML) NASAL
Qty: 1 EACH | Refills: 1 | Status: SHIPPED | OUTPATIENT
Start: 2023-08-09

## 2023-08-23 ENCOUNTER — HOSPITAL ENCOUNTER (OUTPATIENT)
Dept: OTHER | Age: 68
Setting detail: THERAPIES SERIES
Discharge: HOME | End: 2023-08-23
Payer: MEDICARE

## 2023-08-23 VITALS
BODY MASS INDEX: 32.74 KG/M2 | RESPIRATION RATE: 18 BRPM | HEART RATE: 72 BPM | DIASTOLIC BLOOD PRESSURE: 84 MMHG | SYSTOLIC BLOOD PRESSURE: 162 MMHG | WEIGHT: 179 LBS | OXYGEN SATURATION: 95 %

## 2023-08-23 LAB
ANION GAP SERPL CALCULATED.3IONS-SCNC: 8 MMOL/L (ref 7–16)
BNP SERPL-MCNC: 748 PG/ML (ref 0–125)
BUN SERPL-MCNC: 30 MG/DL (ref 6–23)
CALCIUM SERPL-MCNC: 9.2 MG/DL (ref 8.6–10.2)
CHLORIDE SERPL-SCNC: 107 MMOL/L (ref 98–107)
CO2 SERPL-SCNC: 25 MMOL/L (ref 22–29)
CREAT SERPL-MCNC: 1.2 MG/DL (ref 0.5–1)
GFR SERPL CREATININE-BSD FRML MDRD: 48 ML/MIN/1.73M2
GLUCOSE SERPL-MCNC: 113 MG/DL (ref 74–99)
POTASSIUM SERPL-SCNC: 4.3 MMOL/L (ref 3.5–5)
SODIUM SERPL-SCNC: 140 MMOL/L (ref 132–146)

## 2023-08-23 PROCEDURE — 36415 COLL VENOUS BLD VENIPUNCTURE: CPT

## 2023-08-23 PROCEDURE — 80048 BASIC METABOLIC PNL TOTAL CA: CPT

## 2023-08-23 PROCEDURE — 99214 OFFICE O/P EST MOD 30 MIN: CPT

## 2023-08-23 PROCEDURE — 83880 ASSAY OF NATRIURETIC PEPTIDE: CPT

## 2023-08-23 NOTE — PROGRESS NOTES
[facility]     Given clinic phone number [phone] and aware of signs and symptoms to call with any HF change in symptoms.

## 2023-08-31 DIAGNOSIS — J30.1 SEASONAL ALLERGIC RHINITIS DUE TO POLLEN: Primary | ICD-10-CM

## 2023-08-31 RX ORDER — FLUTICASONE PROPIONATE 50 MCG
SPRAY, SUSPENSION (ML) NASAL
Qty: 2 EACH | Refills: 0 | Status: SHIPPED | OUTPATIENT
Start: 2023-08-31

## 2023-09-12 ENCOUNTER — OFFICE VISIT (OUTPATIENT)
Dept: ENT CLINIC | Age: 68
End: 2023-09-12
Payer: MEDICARE

## 2023-09-12 VITALS
WEIGHT: 178 LBS | HEIGHT: 62 IN | SYSTOLIC BLOOD PRESSURE: 111 MMHG | DIASTOLIC BLOOD PRESSURE: 63 MMHG | HEART RATE: 73 BPM | RESPIRATION RATE: 12 BRPM | BODY MASS INDEX: 32.76 KG/M2

## 2023-09-12 DIAGNOSIS — H61.22 IMPACTED CERUMEN OF LEFT EAR: Primary | ICD-10-CM

## 2023-09-12 DIAGNOSIS — J38.1 REINKE'S EDEMA OF VOCAL FOLDS: ICD-10-CM

## 2023-09-12 PROCEDURE — 99214 OFFICE O/P EST MOD 30 MIN: CPT | Performed by: OTOLARYNGOLOGY

## 2023-09-12 PROCEDURE — 4004F PT TOBACCO SCREEN RCVD TLK: CPT

## 2023-09-12 PROCEDURE — 1123F ACP DISCUSS/DSCN MKR DOCD: CPT | Performed by: OTOLARYNGOLOGY

## 2023-09-12 PROCEDURE — 3017F COLORECTAL CA SCREEN DOC REV: CPT

## 2023-09-12 PROCEDURE — G8427 DOCREV CUR MEDS BY ELIG CLIN: HCPCS

## 2023-09-12 PROCEDURE — 3074F SYST BP LT 130 MM HG: CPT | Performed by: OTOLARYNGOLOGY

## 2023-09-12 PROCEDURE — 69210 REMOVE IMPACTED EAR WAX UNI: CPT

## 2023-09-12 PROCEDURE — 1090F PRES/ABSN URINE INCON ASSESS: CPT

## 2023-09-12 PROCEDURE — 3078F DIAST BP <80 MM HG: CPT | Performed by: OTOLARYNGOLOGY

## 2023-09-12 PROCEDURE — G8417 CALC BMI ABV UP PARAM F/U: HCPCS

## 2023-09-12 PROCEDURE — G8399 PT W/DXA RESULTS DOCUMENT: HCPCS

## 2023-09-12 ASSESSMENT — ENCOUNTER SYMPTOMS
RESPIRATORY NEGATIVE: 1
VOICE CHANGE: 1
EYES NEGATIVE: 1
ALLERGIC/IMMUNOLOGIC NEGATIVE: 1

## 2023-09-12 NOTE — PATIENT INSTRUCTIONS
Thank you for choosing our [facility] practice. We are committed to your medical treatment and  care. If you need to reschedule or cancel your surgery or follow up  appointment, please call the surgery scheduler at [phone]. INSTRUCTIONS FOR SURGERY Panendoscopy w/ Biopsy     Nothing to eat or drink after midnight the night before surgery unless surgery is at [facility] or otherwise instructed by the hospital.    DO NOT TAKE ANY ASPIRIN PRODUCTS 7 days prior to surgery-unless required by your cardiologist or primary care physician. Tylenol only. No Advil, Motrin, Aleve, or Ibuprofen    Any illegal drugs in your system (including Marijuana even if legally prescribed) will result in your surgery being cancelled. Please be sure to check with our office or the hospital on time frame for the drugs to be out of your system. Should your insurance change at any time you must contact our office. Failure to do so may result in your surgery being rescheduled. If you need paperwork filled out for work, you must give the office 2 weeks to complete and submit the forms.       [address] [place] [address] will call you a couple days prior to your surgery and give you further instructions, if any questions call them at [phone]

## 2023-09-12 NOTE — PROGRESS NOTES
Subjective:     Patient ID:  Kurt Craft is a 75 y.o. female. HPI:    Pt returns today for followup, s/p right true vocal cord biopsy  which returned as benign vocal cord nodule in 4/2022. Today c/o hoarseness, cough, congestion and ear pain. Taking Singulair and Claritin. Also with neck swelling and pain. Using Astelin and Flonase with no relief. Coughing up green/yellow sputum. Smoking 8 cigs per day. Follows with Dr. Amber Sheldon for pulmonology and is on inhalers. Reports 30 lb weight loss over the past year due to emesis and diarrhea, went to [facility] for this. Reports trouble swallowing due to pain. Review of Systems   Constitutional: Negative. HENT:  Positive for voice change. Eyes: Negative. Respiratory: Negative. Skin: Negative. Allergic/Immunologic: Negative. Neurological: Negative. Hematological: Negative. Psychiatric/Behavioral: Negative. All other systems reviewed and are negative. Objective:   Physical Exam  Vitals reviewed. Constitutional:       Appearance: Normal appearance. HENT:      Head: Normocephalic. Right Ear: Tympanic membrane, ear canal and external ear normal.      Left Ear: Tympanic membrane, ear canal and external ear normal.      Nose: Nose normal.      Mouth/Throat:      Mouth: Mucous membranes are moist.   Eyes:      Conjunctiva/sclera: Conjunctivae normal.   Cardiovascular:      Rate and Rhythm: Normal rate. Pulses: Normal pulses. Pulmonary:      Effort: Pulmonary effort is normal.   Musculoskeletal:      Cervical back: Normal range of motion and neck supple. Skin:     General: Skin is warm. Capillary Refill: Capillary refill takes less than 2 seconds. Neurological:      Mental Status: She is alert.        Endoscopy Procedure Note    Pre-operative Diagnosis: hoarseness, smoker  Post-operative Diagnosis: same  Indications: same  Anesthesia: Lidocaine 2% and Brayan-Synephrine 1/2%  Endoscopy Type:  Flexible

## 2023-09-13 ENCOUNTER — TELEPHONE (OUTPATIENT)
Dept: CARDIOLOGY CLINIC | Age: 68
End: 2023-09-13

## 2023-09-13 NOTE — TELEPHONE ENCOUNTER
Patient states she needs cardiac clearance for EGD by Dr. Kirstin Patrick on 10/4/23 at [address] [place] [address]. Patient denies any chest pain, shortness of breath or palpitations since last visit in June 2023. Patient is able to complete all activities of daily living, including; climbing one flight of stairs and walking one block without cardiac symptoms. Patient on Aspirin. Please advise.

## 2023-09-13 NOTE — TELEPHONE ENCOUNTER
Patient's revised cardiac risk index is elevated (3), class IV risk, 11% risk of major perioperative cardiac events. Currently, patient denies any active cardiac symptoms including chest pain, decompensated heart failure, uncontrolled arrhythmias or obstructive valve lesions. Continue Coreg perioperatively. I would recommend continuing aspirin 81 mg p.o. daily if its not absolutely contraindicated. No further cardiac work-up is needed prior to an EGD and proceed with the procedure as scheduled.

## 2023-09-13 NOTE — TELEPHONE ENCOUNTER
Patient notified of Dr. Boateng's risk assessment/recommendations for EGD. Note routed to Dr. Abena Barba.

## 2023-09-20 ENCOUNTER — TELEPHONE (OUTPATIENT)
Dept: ENT CLINIC | Age: 68
End: 2023-09-20

## 2023-09-20 ENCOUNTER — PREP FOR PROCEDURE (OUTPATIENT)
Dept: ENT CLINIC | Age: 68
End: 2023-09-20

## 2023-09-20 DIAGNOSIS — J38.3 LESION OF TRUE VOCAL CORD: ICD-10-CM

## 2023-09-20 NOTE — TELEPHONE ENCOUNTER
Prior Authorization Form:      DEMOGRAPHICS:                     Patient Name:  Gustavo Joya  Patient :  1955            Insurance:  Payor: [id number] / Plan: [address] [place] [address] / Product Type: *No Product type* /   Insurance ID Number:    Payer/Plan Subscr  Sex Relation Sub. Ins. ID Effective Group Num   1. [employer] MEDICARE Gustavo Joya 1955 Female Self [id number]                                   [address]   2.  [facility] 1955 Female Self [id number]                                   [address]         DIAGNOSIS & PROCEDURE:                       Procedure/Operation: Direct Laryngoscopy w/ biopsy w/ microsuspension of right true vocal cord lesion            CPT Code: 66396,73962,    Diagnosis:  right true vocal cord lesion     ICD10 Code: J38.1    Location:  [facility]    Surgeon:  ross    SCHEDULING INFORMATION:                          Date: 10/4/23    Time: n/a              Anesthesia:  General                                                       Status:  Outpatient        Special Comments:  include all chart notes        Electronically signed by Juan F Luther MA on 2023 at 10:42 AM

## 2023-09-28 NOTE — PROGRESS NOTES
[address] [facility] PRE-ADMISSION TESTING INSTRUCTIONS    The Preadmission Testing patient is instructed accordingly using the following criteria (check applicable):    ARRIVAL INSTRUCTIONS:  [x] Parking the day of surgery is located in the Main Entrance lot. Upon entering the door make an immediate right to the surgery reception desk. [x] Bring photo ID and insurance card. [] Bring in a copy of Living will or Durable Power of  papers. [x] Please be sure to arrange for responsible adult to provide transportation to and from the hospital.    [x] Please arrange for responsible adult to be with you for the 24 hour period post procedure due to having anesthesia. [x] If you awake am of surgery not feeling well or have temperature >100 please call [phone]. GENERAL INSTRUCTIONS:    [x] Nothing by mouth after midnight, including gum, candy, mints or water. [x] You may brush your teeth, but do not swallow any water. [x] Take medications as instructed with 1-2 oz of water. [x] Stop herbal supplements and vitamins 5 days prior to procedure. [x] Follow preop dosing of blood thinners per physician instructions. [x] Take 1/2 dose of evening insulin, but no insulin after midnight. [x] No oral diabetic medications after midnight. [x] If diabetic and have low blood sugar or feel symptomatic, take 1-2oz apple juice only. [] Bring inhalers day of surgery. [] Bring urine specimen day of surgery. [x] Shower or bath with soap, lather and rinse well AM of surgery. No lotion, powders or creams on your body. [] Follow bowel prep as instructed per surgeon. [x] No tobacco products within 24 hours of surgery . [x] No alcohol or illegal drug use within 24 hours of surgery. [x] Jewelry, body piercings , eyeglasses, contact lenses and dentures are not permitted into surgery (bring cases).       [x] Please do not wear any nail polish, make up or hair products on the day of surgery. [x] You can expect a call the business day prior to procedure to notify you if your arrival time changes. [x] If you receive a survey after surgery we would greatly appreciate your comments. [] Parent/guardian of a minor must accompany their child and remain on the premises  the entire time they are under our care. [] Pediatric patients may bring favorite toy, blanket or comfort item. [] A caregiver or family member must remain with the patient during their stay if they are mentally handicapped, have dementia, are disoriented or unable to use a call light or would be a safety concern if left unattended. [x] Please notify surgeon if you develop any illness between now and time of surgery (cold, cough, sore throat, fever, nausea, vomiting) or any signs of infections  including skin, wounds, and dental.    [x]  The Outpatient Pharmacy is available to fill your prescription here on your day of surgery, ask your preop nurse for details.     [] Other instructions    EDUCATIONAL MATERIALS PROVIDED:    [] PAT Preoperative Education Packet/Booklet     [] Medication List    [] Transfusion bracelet applied with instructions    [] Shower with soap, lather and rinse well, and use CHG wipes provided the evening before surgery as instructed    [] Incentive spirometer with instructions

## 2023-10-03 ENCOUNTER — ANESTHESIA EVENT (OUTPATIENT)
Dept: OPERATING ROOM | Age: 68
End: 2023-10-03
Payer: MEDICARE

## 2023-10-03 ASSESSMENT — LIFESTYLE VARIABLES: SMOKING_STATUS: 1

## 2023-10-03 NOTE — ANESTHESIA PRE PROCEDURE
Department of Anesthesiology  Preprocedure Note       Name:  Denise Stevens   Age:  75 y.o.  :  1955                                          MRN:  [id number]         Date:  10/3/2023      Surgeon: Adan Young):  Erik Diaz DO    Procedure: Procedure(s):  DIRECT LARYNGOSCOPY WITH  MICROSUSPENSION AND BIOPSY RIGHT TRUE VOCAL CORD  ++LATEX ALLERGY++    Medications prior to admission:   Prior to Admission medications    Medication Sig Start Date End Date Taking?  Authorizing Provider   fluticasone (FLONASE) 50 MCG/ACT nasal spray SPRAY 2 SPRAYS INTO EACH NOSTRIL IN THE MORNING 23   Parish Mesa DO   hydrALAZINE (APRESOLINE) 50 MG tablet Take 1 tablet by mouth 3 times daily    Historical Provider, MD   carvedilol (COREG) 25 MG tablet Take 1 tablet by mouth 2 times daily (with meals) 23   Denis Ferrell MD   isosorbide mononitrate (IMDUR) 60 MG extended release tablet Take 1 tablet by mouth daily 23   Denis Ferrell MD   sacubitril-valsartan (ENTRESTO)  MG per tablet Take 1 tablet by mouth 2 times daily 23   Denis Ferrell MD   simvastatin (ZOCOR) 20 MG tablet Take 1 tablet by mouth nightly 23   Denis Ferrell MD   budesonide-formoterol (SYMBICORT) 160-4.5 MCG/ACT AERO Inhale 2 puffs into the lungs 2 times daily  Patient not taking: Reported on 2023    Historical Provider, MD   Multiple Vitamins-Minerals (THERAPEUTIC MULTIVITAMIN-MINERALS) tablet Take 1 tablet by mouth daily    Historical Provider, MD   meloxicam (MOBIC) 7.5 MG tablet Take 1 tablet by mouth daily    Historical Provider, MD   baclofen (LIORESAL) 10 MG tablet Take 1 tablet by mouth 2 times daily  Patient not taking: Reported on 2023    Historical Provider, MD   cloNIDine (CATAPRES) 0.3 MG tablet Take 1 tablet by mouth 3 times daily    Historical Provider, MD   cetirizine (ZYRTEC) 10 MG tablet TAKE 1 TABLET BY MOUTH EVERY DAY 23   Erik Diaz DO   bumetanide ([address]) 1

## 2023-10-04 ENCOUNTER — ANESTHESIA (OUTPATIENT)
Dept: OPERATING ROOM | Age: 68
End: 2023-10-04
Payer: MEDICARE

## 2023-10-04 ENCOUNTER — HOSPITAL ENCOUNTER (OUTPATIENT)
Age: 68
Setting detail: OUTPATIENT SURGERY
Discharge: HOME | End: 2023-10-04
Attending: OTOLARYNGOLOGY | Admitting: OTOLARYNGOLOGY
Payer: MEDICARE

## 2023-10-04 VITALS
RESPIRATION RATE: 18 BRPM | HEIGHT: 62 IN | HEART RATE: 72 BPM | SYSTOLIC BLOOD PRESSURE: 137 MMHG | BODY MASS INDEX: 31.28 KG/M2 | DIASTOLIC BLOOD PRESSURE: 80 MMHG | TEMPERATURE: 97 F | WEIGHT: 170 LBS | OXYGEN SATURATION: 93 %

## 2023-10-04 DIAGNOSIS — G89.18 POST-OP PAIN: Primary | ICD-10-CM

## 2023-10-04 DIAGNOSIS — J38.3 LESION OF TRUE VOCAL CORD: ICD-10-CM

## 2023-10-04 LAB
ANION GAP SERPL CALCULATED.3IONS-SCNC: 10 MMOL/L (ref 7–16)
BUN SERPL-MCNC: 34 MG/DL (ref 6–23)
CALCIUM SERPL-MCNC: 8.6 MG/DL (ref 8.6–10.2)
CHLORIDE SERPL-SCNC: 109 MMOL/L (ref 98–107)
CO2 SERPL-SCNC: 21 MMOL/L (ref 22–29)
CREAT SERPL-MCNC: 1.3 MG/DL (ref 0.5–1)
ERYTHROCYTE [DISTWIDTH] IN BLOOD BY AUTOMATED COUNT: 11.9 % (ref 11.5–15)
GFR SERPL CREATININE-BSD FRML MDRD: 44 ML/MIN/1.73M2
GLUCOSE BLD-MCNC: 168 MG/DL (ref 74–99)
GLUCOSE SERPL-MCNC: 190 MG/DL (ref 74–99)
HCT VFR BLD AUTO: 34.2 % (ref 34–48)
HGB BLD-MCNC: 11.3 G/DL (ref 11.5–15.5)
MCH RBC QN AUTO: 32.4 PG (ref 26–35)
MCHC RBC AUTO-ENTMCNC: 33 G/DL (ref 32–34.5)
MCV RBC AUTO: 98 FL (ref 80–99.9)
PLATELET # BLD AUTO: 215 K/UL (ref 130–450)
PMV BLD AUTO: 10.4 FL (ref 7–12)
POTASSIUM SERPL-SCNC: 4.8 MMOL/L (ref 3.5–5)
RBC # BLD AUTO: 3.49 M/UL (ref 3.5–5.5)
SODIUM SERPL-SCNC: 140 MMOL/L (ref 132–146)
WBC OTHER # BLD: 7.3 K/UL (ref 4.5–11.5)

## 2023-10-04 PROCEDURE — 2709999900 HC NON-CHARGEABLE SUPPLY: Performed by: OTOLARYNGOLOGY

## 2023-10-04 PROCEDURE — 2580000003 HC RX 258: Performed by: OTOLARYNGOLOGY

## 2023-10-04 PROCEDURE — 7100000010 HC PHASE II RECOVERY - FIRST 15 MIN: Performed by: OTOLARYNGOLOGY

## 2023-10-04 PROCEDURE — 3700000000 HC ANESTHESIA ATTENDED CARE: Performed by: OTOLARYNGOLOGY

## 2023-10-04 PROCEDURE — 88305 TISSUE EXAM BY PATHOLOGIST: CPT

## 2023-10-04 PROCEDURE — 7100000011 HC PHASE II RECOVERY - ADDTL 15 MIN: Performed by: OTOLARYNGOLOGY

## 2023-10-04 PROCEDURE — 3700000001 HC ADD 15 MINUTES (ANESTHESIA): Performed by: OTOLARYNGOLOGY

## 2023-10-04 PROCEDURE — 3600000002 HC SURGERY LEVEL 2 BASE: Performed by: OTOLARYNGOLOGY

## 2023-10-04 PROCEDURE — 6370000000 HC RX 637 (ALT 250 FOR IP): Performed by: STUDENT IN AN ORGANIZED HEALTH CARE EDUCATION/TRAINING PROGRAM

## 2023-10-04 PROCEDURE — 6360000002 HC RX W HCPCS: Performed by: STUDENT IN AN ORGANIZED HEALTH CARE EDUCATION/TRAINING PROGRAM

## 2023-10-04 PROCEDURE — 85027 COMPLETE CBC AUTOMATED: CPT

## 2023-10-04 PROCEDURE — 82962 GLUCOSE BLOOD TEST: CPT

## 2023-10-04 PROCEDURE — 2500000003 HC RX 250 WO HCPCS

## 2023-10-04 PROCEDURE — 3600000012 HC SURGERY LEVEL 2 ADDTL 15MIN: Performed by: OTOLARYNGOLOGY

## 2023-10-04 PROCEDURE — 6360000002 HC RX W HCPCS

## 2023-10-04 PROCEDURE — 7100000000 HC PACU RECOVERY - FIRST 15 MIN: Performed by: OTOLARYNGOLOGY

## 2023-10-04 PROCEDURE — 7100000001 HC PACU RECOVERY - ADDTL 15 MIN: Performed by: OTOLARYNGOLOGY

## 2023-10-04 PROCEDURE — 6370000000 HC RX 637 (ALT 250 FOR IP): Performed by: OTOLARYNGOLOGY

## 2023-10-04 PROCEDURE — 80048 BASIC METABOLIC PNL TOTAL CA: CPT

## 2023-10-04 PROCEDURE — 36415 COLL VENOUS BLD VENIPUNCTURE: CPT

## 2023-10-04 RX ORDER — FENTANYL CITRATE 50 UG/ML
INJECTION, SOLUTION INTRAMUSCULAR; INTRAVENOUS PRN
Status: DISCONTINUED | OUTPATIENT
Start: 2023-10-04 | End: 2023-10-04 | Stop reason: SDUPTHER

## 2023-10-04 RX ORDER — SODIUM CHLORIDE 0.9 % (FLUSH) 0.9 %
5-40 SYRINGE (ML) INJECTION EVERY 12 HOURS SCHEDULED
Status: DISCONTINUED | OUTPATIENT
Start: 2023-10-04 | End: 2023-10-04 | Stop reason: HOSPADM

## 2023-10-04 RX ORDER — ONDANSETRON 2 MG/ML
INJECTION INTRAMUSCULAR; INTRAVENOUS PRN
Status: DISCONTINUED | OUTPATIENT
Start: 2023-10-04 | End: 2023-10-04 | Stop reason: SDUPTHER

## 2023-10-04 RX ORDER — PROPOFOL 10 MG/ML
INJECTION, EMULSION INTRAVENOUS PRN
Status: DISCONTINUED | OUTPATIENT
Start: 2023-10-04 | End: 2023-10-04 | Stop reason: SDUPTHER

## 2023-10-04 RX ORDER — SODIUM CHLORIDE 9 MG/ML
INJECTION, SOLUTION INTRAVENOUS PRN
Status: DISCONTINUED | OUTPATIENT
Start: 2023-10-04 | End: 2023-10-04 | Stop reason: HOSPADM

## 2023-10-04 RX ORDER — MIDAZOLAM HYDROCHLORIDE 1 MG/ML
INJECTION INTRAMUSCULAR; INTRAVENOUS PRN
Status: DISCONTINUED | OUTPATIENT
Start: 2023-10-04 | End: 2023-10-04 | Stop reason: SDUPTHER

## 2023-10-04 RX ORDER — ONDANSETRON 4 MG/1
4 TABLET, FILM COATED ORAL 3 TIMES DAILY PRN
Qty: 15 TABLET | Refills: 0 | Status: SHIPPED | OUTPATIENT
Start: 2023-10-04

## 2023-10-04 RX ORDER — ROCURONIUM BROMIDE 10 MG/ML
INJECTION, SOLUTION INTRAVENOUS PRN
Status: DISCONTINUED | OUTPATIENT
Start: 2023-10-04 | End: 2023-10-04 | Stop reason: SDUPTHER

## 2023-10-04 RX ORDER — SODIUM CHLORIDE 0.9 % (FLUSH) 0.9 %
5-40 SYRINGE (ML) INJECTION PRN
Status: DISCONTINUED | OUTPATIENT
Start: 2023-10-04 | End: 2023-10-04 | Stop reason: HOSPADM

## 2023-10-04 RX ORDER — FENTANYL CITRATE 50 UG/ML
50 INJECTION, SOLUTION INTRAMUSCULAR; INTRAVENOUS EVERY 5 MIN PRN
Status: DISCONTINUED | OUTPATIENT
Start: 2023-10-04 | End: 2023-10-04 | Stop reason: HOSPADM

## 2023-10-04 RX ORDER — EPINEPHRINE NASAL SOLUTION 1 MG/ML
SOLUTION NASAL PRN
Status: DISCONTINUED | OUTPATIENT
Start: 2023-10-04 | End: 2023-10-04 | Stop reason: ALTCHOICE

## 2023-10-04 RX ORDER — PROCHLORPERAZINE EDISYLATE 5 MG/ML
5 INJECTION INTRAMUSCULAR; INTRAVENOUS
Status: DISCONTINUED | OUTPATIENT
Start: 2023-10-04 | End: 2023-10-04 | Stop reason: HOSPADM

## 2023-10-04 RX ORDER — TRAMADOL HYDROCHLORIDE 50 MG/1
50 TABLET ORAL EVERY 6 HOURS PRN
Qty: 12 TABLET | Refills: 0 | Status: SHIPPED | OUTPATIENT
Start: 2023-10-04 | End: 2023-10-07

## 2023-10-04 RX ORDER — OMEPRAZOLE 20 MG/1
40 TABLET, DELAYED RELEASE ORAL DAILY
Qty: 30 TABLET | Refills: 3 | Status: SHIPPED | OUTPATIENT
Start: 2023-10-04

## 2023-10-04 RX ORDER — SUCCINYLCHOLINE/SOD CL,ISO/PF 200MG/10ML
SYRINGE (ML) INTRAVENOUS PRN
Status: DISCONTINUED | OUTPATIENT
Start: 2023-10-04 | End: 2023-10-04 | Stop reason: SDUPTHER

## 2023-10-04 RX ORDER — TRAMADOL HYDROCHLORIDE 50 MG/1
50 TABLET ORAL EVERY 6 HOURS PRN
Status: DISCONTINUED | OUTPATIENT
Start: 2023-10-04 | End: 2023-10-04 | Stop reason: HOSPADM

## 2023-10-04 RX ORDER — DEXAMETHASONE SODIUM PHOSPHATE 4 MG/ML
INJECTION, SOLUTION INTRA-ARTICULAR; INTRALESIONAL; INTRAMUSCULAR; INTRAVENOUS; SOFT TISSUE PRN
Status: DISCONTINUED | OUTPATIENT
Start: 2023-10-04 | End: 2023-10-04 | Stop reason: SDUPTHER

## 2023-10-04 RX ADMIN — FENTANYL CITRATE 50 MCG: 50 INJECTION, SOLUTION INTRAMUSCULAR; INTRAVENOUS at 09:52

## 2023-10-04 RX ADMIN — SUGAMMADEX 308 MG: 100 INJECTION, SOLUTION INTRAVENOUS at 10:10

## 2023-10-04 RX ADMIN — ROCURONIUM BROMIDE 40 MG: 10 INJECTION, SOLUTION INTRAVENOUS at 09:55

## 2023-10-04 RX ADMIN — ONDANSETRON 4 MG: 2 INJECTION INTRAMUSCULAR; INTRAVENOUS at 09:55

## 2023-10-04 RX ADMIN — Medication 180 MG: at 09:52

## 2023-10-04 RX ADMIN — SODIUM CHLORIDE, PRESERVATIVE FREE 10 ML: 5 INJECTION INTRAVENOUS at 08:43

## 2023-10-04 RX ADMIN — FENTANYL CITRATE 50 MCG: 50 INJECTION INTRAMUSCULAR; INTRAVENOUS at 10:57

## 2023-10-04 RX ADMIN — PROPOFOL 160 MG: 10 INJECTION, EMULSION INTRAVENOUS at 09:52

## 2023-10-04 RX ADMIN — TRAMADOL HYDROCHLORIDE 50 MG: 50 TABLET, COATED ORAL at 12:09

## 2023-10-04 RX ADMIN — MIDAZOLAM 1 MG: 1 INJECTION INTRAMUSCULAR; INTRAVENOUS at 09:43

## 2023-10-04 RX ADMIN — ROCURONIUM BROMIDE 10 MG: 10 INJECTION, SOLUTION INTRAVENOUS at 09:52

## 2023-10-04 RX ADMIN — DEXAMETHASONE SODIUM PHOSPHATE 8 MG: 4 INJECTION, SOLUTION INTRAMUSCULAR; INTRAVENOUS at 09:55

## 2023-10-04 RX ADMIN — SODIUM CHLORIDE: 9 INJECTION, SOLUTION INTRAVENOUS at 09:43

## 2023-10-04 ASSESSMENT — PAIN - FUNCTIONAL ASSESSMENT
PAIN_FUNCTIONAL_ASSESSMENT: ACTIVITIES ARE NOT PREVENTED
PAIN_FUNCTIONAL_ASSESSMENT: 0-10

## 2023-10-04 ASSESSMENT — PAIN DESCRIPTION - PAIN TYPE: TYPE: SURGICAL PAIN

## 2023-10-04 ASSESSMENT — PAIN DESCRIPTION - ORIENTATION: ORIENTATION: RIGHT

## 2023-10-04 ASSESSMENT — PAIN DESCRIPTION - FREQUENCY: FREQUENCY: CONTINUOUS

## 2023-10-04 ASSESSMENT — PAIN SCALES - GENERAL
PAINLEVEL_OUTOF10: 9
PAINLEVEL_OUTOF10: 8

## 2023-10-04 ASSESSMENT — PAIN DESCRIPTION - LOCATION
LOCATION: EAR;THROAT
LOCATION: EAR;THROAT

## 2023-10-04 ASSESSMENT — PAIN DESCRIPTION - DESCRIPTORS: DESCRIPTORS: DISCOMFORT

## 2023-10-04 NOTE — PROGRESS NOTES
Patient made aware of chipped tooth and partial by Inez Saunders. Both chipped pieces were supplied to the patient. She states she has a dentist appt tommorow. See OP-note.

## 2023-10-04 NOTE — H&P
H&P reviewed, no changes. No issues. Questions and concerns were answered to the patient's satisfaction.  Will proceed with procedure    Will discuss with attending     Electronically signed by Stephanie Hernández DO on 10/4/23 at 7:05 AM EDT

## 2023-10-04 NOTE — H&P
Subjective:      Patient ID:  Papi Lees is a 75 y.o. female. HPI:     Pt returns today for followup, s/p right true vocal cord biopsy  which returned as benign vocal cord nodule in 4/2022. Today c/o hoarseness, cough, congestion and ear pain. Taking Singulair and Claritin. Also with neck swelling and pain. Using Astelin and Flonase with no relief. Coughing up green/yellow sputum. Smoking 8 cigs per day. Follows with Dr. Galen Ayala for pulmonology and is on inhalers. Reports 30 lb weight loss over the past year due to emesis and diarrhea, went to [facility] for this. Reports trouble swallowing due to pain. Review of Systems   Constitutional: Negative. HENT:  Positive for voice change. Eyes: Negative. Respiratory: Negative. Skin: Negative. Allergic/Immunologic: Negative. Neurological: Negative. Hematological: Negative. Psychiatric/Behavioral: Negative. All other systems reviewed and are negative. Objective:   Physical Exam  Vitals reviewed. Constitutional:       Appearance: Normal appearance. HENT:      Head: Normocephalic. Right Ear: Tympanic membrane, ear canal and external ear normal.      Left Ear: Tympanic membrane, ear canal and external ear normal.      Nose: Nose normal.      Mouth/Throat:      Mouth: Mucous membranes are moist.   Eyes:      Conjunctiva/sclera: Conjunctivae normal.   Cardiovascular:      Rate and Rhythm: Normal rate. Pulses: Normal pulses. Pulmonary:      Effort: Pulmonary effort is normal.   Musculoskeletal:      Cervical back: Normal range of motion and neck supple. Skin:     General: Skin is warm. Capillary Refill: Capillary refill takes less than 2 seconds. Neurological:      Mental Status: She is alert.          Endoscopy Procedure Note     Pre-operative Diagnosis: hoarseness, smoker  Post-operative Diagnosis: same  Indications: same  Anesthesia: Lidocaine 2% and Brayan-Synephrine 1/2%  Endoscopy Type:  Flexible

## 2023-10-04 NOTE — OP NOTE
10/4/2023  Kenroy Martinez  [id number]      Pre-operative Diagnosis: hoarseness, right vocal cord mass     Post-operative Diagnosis: same    Procedure: Direct laryngoscopy with right false vocal cord biopsy    Anesthesia: General endotracheal anesthesia    Surgeons/Assistants: Joe /Moshe    Estimated Blood Loss: less than 50     Complications: chipped tooth #8, partials dislodged #9,10    Specimens: was Obtained    Findings: significantly difficult airway due to redundancy of tissue, very floppy epiglottis, able to use Holinger to visualize larynx with significant reinkes edema of the false cord bilaterally R >L, true vocal cords with bilateral vocal cord polyps     Indications for procedure: 76 y/o female with history of benign vocal cord nodule with  worsening of hoarseness of voice, smoking. Risks benefits alteratives discussed including but not limited to tearing of tissue, injury to lips, teeth or tongue, damage to adjacent structure, need for further procedure. Patient understood and agreed to proceed. DESCRIPTION OF PROCEDURE: The patient was consented preoperatively, taken   back to the operating room, identified, and appropriately placed in the   supine position. Given anesthesia per general intubation. Once the patient   was intubated, they were turned 90 degrees, and the bed was elevated to my hip   height. A tooth guard was placed in the patient's   oral cavity to protect the upper teeth. One finger was placed holding tooth guard in place,  another finger was placed on the patients hard palate to retract superiorly/anteriorly and the dedo laryngoscope was inserted into the patients oral cavity, as the scope was slowly advanced the patients upper dental partials were dislodged, the airway was difficult to reach due to significant redundant tissue. The dedo was removed. Direct laryngoscopy  A Nymiruminger scope was placed in the patient's oral cavity.  All areas of oropharynx and hypopharynx

## 2023-10-04 NOTE — ANESTHESIA POSTPROCEDURE EVALUATION
Department of Anesthesiology  Postprocedure Note    Patient: Bert Sotelo  MRN: [id number]  YOB: 1955  Date of evaluation: 10/4/2023      Procedure Summary     Date: 10/04/23 Room / Location: [facility] OR 07 / [facility]    Anesthesia Start: 8391 Anesthesia Stop: 0690    Procedure: DIRECT LARYNGOSCOPY AND BIOPSY RIGHT FALSE VOCAL CORD (Bilateral: Throat) Diagnosis:       Lesion of true vocal cord      (Lesion of true vocal cord [J38.3])    Surgeons: Travis Loco DO Responsible Provider: Santhosh Cardenas DO    Anesthesia Type: general ASA Status: 4          Anesthesia Type: No value filed.     Naomi Phase I: Naomi Score: 10    Naomi Phase II:        Anesthesia Post Evaluation    Patient location during evaluation: PACU  Patient participation: complete - patient participated  Level of consciousness: awake and alert  Nausea & Vomiting: no vomiting and no nausea  Complications: no  Cardiovascular status: hemodynamically stable  Respiratory status: acceptable and spontaneous ventilation  Hydration status: stable  Pain management: adequate

## 2023-10-06 ENCOUNTER — TELEPHONE (OUTPATIENT)
Dept: ENT CLINIC | Age: 68
End: 2023-10-06

## 2023-10-06 NOTE — TELEPHONE ENCOUNTER
Patient LVM stating since having surgery, patient has started with throat pain, cough, congestion, yellow/green mucus. Patient unsure if she needs an antibiotic or if this is normal after surgery. Patient can be reached at [phone].     Electronically signed by Seth Reese MA on 10/6/23 at 10:18 AM EDT

## 2023-10-06 NOTE — TELEPHONE ENCOUNTER
Called and spoke with Dr. Samy Rudolph office in regards to treatment plan will send itemized statement to office by Monday will forward to risk management 10/9/23.

## 2023-10-06 NOTE — TELEPHONE ENCOUNTER
Called patient in regards to throat pain,cough,congestion,sinus drainage. Patient was prescribed amoxicillin from dental 10/5/23. Will finish antibitic and follow up at post op.

## 2023-10-06 NOTE — TELEPHONE ENCOUNTER
Dr. Florence Nguyen office called. Pt bridge fell out and teeth fell out. They need to redo bridge and pt stated Dr. Kirstin Patrick is paying for it. They want to clarify. Please call them back at [phone]. She is in until 2. Please advise.  Electronically signed by Gudelia Alexandre on 10/6/2023 at 11:31 AM

## 2023-10-09 ENCOUNTER — HOSPITAL ENCOUNTER (OUTPATIENT)
Dept: GENERAL RADIOLOGY | Age: 68
Discharge: HOME | End: 2023-10-11
Payer: MEDICARE

## 2023-10-09 VITALS — WEIGHT: 175 LBS | BODY MASS INDEX: 32.2 KG/M2 | HEIGHT: 62 IN

## 2023-10-09 DIAGNOSIS — Z12.31 VISIT FOR SCREENING MAMMOGRAM: ICD-10-CM

## 2023-10-09 LAB — SURGICAL PATHOLOGY REPORT: NORMAL

## 2023-10-09 PROCEDURE — 77063 BREAST TOMOSYNTHESIS BI: CPT

## 2023-10-11 ENCOUNTER — OFFICE VISIT (OUTPATIENT)
Dept: ENT CLINIC | Age: 68
End: 2023-10-11

## 2023-10-11 ENCOUNTER — TELEPHONE (OUTPATIENT)
Dept: ENT CLINIC | Age: 68
End: 2023-10-11

## 2023-10-11 VITALS — BODY MASS INDEX: 32.2 KG/M2 | WEIGHT: 175 LBS | RESPIRATION RATE: 18 BRPM | HEIGHT: 62 IN

## 2023-10-11 DIAGNOSIS — J38.3 LESION OF TRUE VOCAL CORD: Primary | ICD-10-CM

## 2023-10-11 PROCEDURE — 99024 POSTOP FOLLOW-UP VISIT: CPT | Performed by: OTOLARYNGOLOGY

## 2023-10-11 RX ORDER — HYDROCODONE BITARTRATE AND ACETAMINOPHEN 5; 325 MG/1; MG/1
1 TABLET ORAL EVERY 6 HOURS PRN
Qty: 20 TABLET | Refills: 0 | Status: SHIPPED | OUTPATIENT
Start: 2023-10-11 | End: 2023-10-16

## 2023-10-11 RX ORDER — HYDROCODONE BITARTRATE AND ACETAMINOPHEN 5; 325 MG/1; MG/1
1 TABLET ORAL EVERY 6 HOURS PRN
Qty: 20 TABLET | Refills: 0 | Status: SHIPPED | OUTPATIENT
Start: 2023-10-11 | End: 2023-10-11 | Stop reason: SDUPTHER

## 2023-10-11 RX ORDER — PREDNISONE 10 MG/1
40 TABLET ORAL DAILY
Qty: 20 TABLET | Refills: 0 | Status: SHIPPED | OUTPATIENT
Start: 2023-10-11 | End: 2023-10-16

## 2023-10-11 ASSESSMENT — ENCOUNTER SYMPTOMS
ALLERGIC/IMMUNOLOGIC NEGATIVE: 1
RESPIRATORY NEGATIVE: 1
VOICE CHANGE: 1
EYES NEGATIVE: 1

## 2023-10-11 NOTE — TELEPHONE ENCOUNTER
Called and spoke with Dr. Flordia Schwab office patient is scheduled for appointment 10/17/23 for assessment of teeth post surgery will fax assessment to office and will send on to risk management.

## 2023-10-11 NOTE — PROGRESS NOTES
Effort: Pulmonary effort is normal.   Musculoskeletal:      Cervical back: Normal range of motion and neck supple. Skin:     General: Skin is warm. Capillary Refill: Capillary refill takes less than 2 seconds. Neurological:      Mental Status: She is alert. Path:  Diagnosis --   Right false vocal cord, biopsy: Laryngeal nodule, negative for   dysplasia. Assessment:       Diagnosis Orders   1. Lesion of true vocal cord                   Plan:      I will give the patient a Rx for steroid to help with the inflammation of the VC and tooth pain.      I will also give a small RX of pain med for comfort  Follow up in 4 month(s)

## 2023-10-17 RX ORDER — OMEPRAZOLE 40 MG/1
40 CAPSULE, DELAYED RELEASE ORAL
Qty: 90 CAPSULE | Refills: 1 | Status: SHIPPED | OUTPATIENT
Start: 2023-10-17

## 2023-10-18 ENCOUNTER — TELEPHONE (OUTPATIENT)
Dept: ENT CLINIC | Age: 68
End: 2023-10-18

## 2023-10-18 NOTE — TELEPHONE ENCOUNTER
Received confirmation from our director that regarding patients partials being damaged during surgery that [facility] ([facility]) will not cover expenses since that is considered a risk of surgery. Inez Reed MA said Dr Awais Bryan waiting on 2nd quote regarding this. Will wait for Risk Management to re-review.

## 2023-10-22 DIAGNOSIS — J30.1 SEASONAL ALLERGIC RHINITIS DUE TO POLLEN: Primary | ICD-10-CM

## 2023-10-23 RX ORDER — AZELASTINE HYDROCHLORIDE 137 UG/1
SPRAY, METERED NASAL
Qty: 1 EACH | Refills: 4 | Status: SHIPPED | OUTPATIENT
Start: 2023-10-23

## 2023-10-26 RX ORDER — FLUTICASONE PROPIONATE 50 MCG
2 SPRAY, SUSPENSION (ML) NASAL DAILY
Qty: 1 EACH | Refills: 3 | Status: SHIPPED | OUTPATIENT
Start: 2023-10-26

## 2023-10-31 ENCOUNTER — TELEPHONE (OUTPATIENT)
Dept: ENT CLINIC | Age: 68
End: 2023-10-31

## 2023-10-31 NOTE — TELEPHONE ENCOUNTER
Patient called in to the office, and immediately upon answering the phone, patient started yelling. She was very upset that during her surgery, one of her teeth was chipped and one was broken. She states that she is in pain and wants to know what we are going to do about it. She states that Dr. Yumi De Los Santos told her he cannot or will not fix them. She went back in forth in her yelling, that Dr. Lillian Smith, her primary dentist, filled some of her fillings but he cannot fix it. She repeated that she wants to know what we are going to do about it. Advised patient that yelling at staff is unnecessary and that I would speak with our RN, surgery scheduler, Dr. Corbin Rudolph and  and that we would reach out to the patient as soon as possible.   Electronically signed by Atiya Moncada LPN on 60/67/7408 at 12:25 PM

## 2023-11-01 NOTE — TELEPHONE ENCOUNTER
Spoke to Dr Dominguez Villalba on 10/31 regarding patients 2nd dental quote that was received 10/26. Okay per Dr Matt Eisenberg to call patient to notify her risk will not pay for dental work. Patient said she can not chew or talk well. Patient kept saying, \"it is not fair to me. \" Patients apt is not until 11/15 with Dr Siva Can the dentist and what is she supposed to do about all this pain. Patient said no one told her her teeth could be knocked out during surgery. Her cracked tooth that was filled is lifted and still up hurts as well. Patient instructed to call dentist to address pain.

## 2023-12-04 ENCOUNTER — TELEPHONE (OUTPATIENT)
Dept: ENT CLINIC | Age: 68
End: 2023-12-04

## 2023-12-04 NOTE — TELEPHONE ENCOUNTER
Called patient in regards to current symptoms patient states difficulty swallowing liquids and foods, patient threw up food,diarrhea,facial pain and pressure, sinus headache. Patient states teeth hurt, throat sore, and very dry. Patient will contact pcp and will let provider know about throat symptoms. Patient states she is seeing oral surgeon at [facility] [phone] patient is scheduled 12/14/23 for procedure to have teeth removed and partial placed will wait for treatment plan to be sent to office.

## 2023-12-04 NOTE — TELEPHONE ENCOUNTER
Patient LVM stating that \"on October the 4th, Dr. Corbin Rudolph went down my throat and since then, I have had pain while swallowing and a really dry throat\". Patient requesting to see Dr. Corbin Rudolph ASA. Patient's next scheduled appointment with Dr. Corbin Rudolph is 2/14/24 for 4 month vocal cord check with possible scope.      Electronically signed by Bijan Petty MA on 12/4/23 at 3:06 PM EST

## 2023-12-05 ENCOUNTER — APPOINTMENT (OUTPATIENT)
Dept: GENERAL RADIOLOGY | Age: 68
DRG: 682 | End: 2023-12-05
Payer: MEDICARE

## 2023-12-05 ENCOUNTER — APPOINTMENT (OUTPATIENT)
Dept: CT IMAGING | Age: 68
DRG: 682 | End: 2023-12-05
Payer: MEDICARE

## 2023-12-05 ENCOUNTER — HOSPITAL ENCOUNTER (INPATIENT)
Age: 68
LOS: 5 days | Discharge: ANOTHER HOSPITAL | DRG: 682 | End: 2023-12-10
Attending: EMERGENCY MEDICINE | Admitting: STUDENT IN AN ORGANIZED HEALTH CARE EDUCATION/TRAINING PROGRAM
Payer: MEDICARE

## 2023-12-05 DIAGNOSIS — R26.2 AMBULATORY DYSFUNCTION: ICD-10-CM

## 2023-12-05 DIAGNOSIS — N30.00 ACUTE CYSTITIS WITHOUT HEMATURIA: ICD-10-CM

## 2023-12-05 DIAGNOSIS — N18.9 ACUTE ON CHRONIC RENAL INSUFFICIENCY: ICD-10-CM

## 2023-12-05 DIAGNOSIS — E87.5 HYPERKALEMIA: ICD-10-CM

## 2023-12-05 DIAGNOSIS — N28.9 ACUTE ON CHRONIC RENAL INSUFFICIENCY: ICD-10-CM
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
## 2023-12-05 PROBLEM — N17.9 AKI (ACUTE KIDNEY INJURY) (HCC): Status: ACTIVE | Noted: 2023-12-05

## 2023-12-05 LAB
ALBUMIN SERPL-MCNC: 2.9 G/DL (ref 3.5–5.2)
ALP SERPL-CCNC: 131 U/L (ref 35–104)
ALT SERPL-CCNC: 50 U/L (ref 0–32)
ANION GAP SERPL CALCULATED.3IONS-SCNC: 11 MMOL/L (ref 7–16)
AST SERPL-CCNC: 28 U/L (ref 0–31)
BACTERIA URNS QL MICRO: ABNORMAL
BASOPHILS # BLD: 0.01 K/UL (ref 0–0.2)
BASOPHILS NFR BLD: 0 % (ref 0–2)
BILIRUB SERPL-MCNC: 1 MG/DL (ref 0–1.2)
BILIRUB UR QL STRIP: NEGATIVE
BUN SERPL-MCNC: 40 MG/DL (ref 6–23)
CALCIUM SERPL-MCNC: 8.4 MG/DL (ref 8.6–10.2)
CASTS #/AREA URNS LPF: ABNORMAL /LPF
CHLORIDE SERPL-SCNC: 96 MMOL/L (ref 98–107)
CLARITY UR: CLEAR
CO2 SERPL-SCNC: 21 MMOL/L (ref 22–29)
COLOR UR: ABNORMAL
CREAT SERPL-MCNC: 2 MG/DL (ref 0.5–1)
EOSINOPHIL # BLD: 0.05 K/UL (ref 0.05–0.5)
EOSINOPHILS RELATIVE PERCENT: 1 % (ref 0–6)
EPI CELLS #/AREA URNS HPF: ABNORMAL /HPF
ERYTHROCYTE [DISTWIDTH] IN BLOOD BY AUTOMATED COUNT: 12.2 % (ref 11.5–15)
GFR SERPL CREATININE-BSD FRML MDRD: 27 ML/MIN/1.73M2
GLUCOSE SERPL-MCNC: 180 MG/DL (ref 74–99)
GLUCOSE UR STRIP-MCNC: NEGATIVE MG/DL
HCT VFR BLD AUTO: 34.8 % (ref 34–48)
HGB BLD-MCNC: 11.4 G/DL (ref 11.5–15.5)
HGB UR QL STRIP.AUTO: NEGATIVE
IMM GRANULOCYTES # BLD AUTO: 0.03 K/UL (ref 0–0.58)
IMM GRANULOCYTES NFR BLD: 1 % (ref 0–5)
INFLUENZA A BY PCR: NOT DETECTED
INFLUENZA B BY PCR: NOT DETECTED
KETONES UR STRIP-MCNC: NEGATIVE MG/DL
LACTATE BLDV-SCNC: 1.2 MMOL/L (ref 0.5–2.2)
LEUKOCYTE ESTERASE UR QL STRIP: ABNORMAL
LYMPHOCYTES NFR BLD: 0.23 K/UL (ref 1.5–4)
LYMPHOCYTES RELATIVE PERCENT: 4 % (ref 20–42)
MCH RBC QN AUTO: 31.9 PG (ref 26–35)
MCHC RBC AUTO-ENTMCNC: 32.8 G/DL (ref 32–34.5)
MCV RBC AUTO: 97.5 FL (ref 80–99.9)
MONOCYTES NFR BLD: 0.32 K/UL (ref 0.1–0.95)
MONOCYTES NFR BLD: 6 % (ref 2–12)
NEUTROPHILS NFR BLD: 88 % (ref 43–80)
NEUTS SEG NFR BLD: 4.72 K/UL (ref 1.8–7.3)
NITRITE UR QL STRIP: POSITIVE
PH UR STRIP: 6 [PH] (ref 5–9)
PLATELET # BLD AUTO: 163 K/UL (ref 130–450)
PMV BLD AUTO: 11.3 FL (ref 7–12)
POTASSIUM SERPL-SCNC: 5.6 MMOL/L (ref 3.5–5)
PROT SERPL-MCNC: 6.3 G/DL (ref 6.4–8.3)
PROT UR STRIP-MCNC: 30 MG/DL
RBC # BLD AUTO: 3.57 M/UL (ref 3.5–5.5)
RBC # BLD: ABNORMAL 10*6/UL
RBC # BLD: ABNORMAL 10*6/UL
RBC #/AREA URNS HPF: ABNORMAL /HPF
SARS-COV-2 RDRP RESP QL NAA+PROBE: NOT DETECTED
SODIUM SERPL-SCNC: 128 MMOL/L (ref 132–146)
SP GR UR STRIP: 1.02 (ref 1–1.03)
SPECIMEN DESCRIPTION: NORMAL
UROBILINOGEN UR STRIP-ACNC: 2 EU/DL (ref 0–1)
WBC #/AREA URNS HPF: ABNORMAL /HPF
WBC OTHER # BLD: 5.4 K/UL (ref 4.5–11.5)

## 2023-12-05 PROCEDURE — 6370000000 HC RX 637 (ALT 250 FOR IP): Performed by: STUDENT IN AN ORGANIZED HEALTH CARE EDUCATION/TRAINING PROGRAM

## 2023-12-05 PROCEDURE — 87502 INFLUENZA DNA AMP PROBE: CPT

## 2023-12-05 PROCEDURE — 80053 COMPREHEN METABOLIC PANEL: CPT

## 2023-12-05 PROCEDURE — 71045 X-RAY EXAM CHEST 1 VIEW: CPT

## 2023-12-05 PROCEDURE — 1200000000 HC SEMI PRIVATE

## 2023-12-05 PROCEDURE — 73502 X-RAY EXAM HIP UNI 2-3 VIEWS: CPT

## 2023-12-05 PROCEDURE — 70450 CT HEAD/BRAIN W/O DYE: CPT

## 2023-12-05 PROCEDURE — 2580000003 HC RX 258: Performed by: STUDENT IN AN ORGANIZED HEALTH CARE EDUCATION/TRAINING PROGRAM

## 2023-12-05 PROCEDURE — 83605 ASSAY OF LACTIC ACID: CPT

## 2023-12-05 PROCEDURE — 71250 CT THORAX DX C-: CPT

## 2023-12-05 PROCEDURE — 87086 URINE CULTURE/COLONY COUNT: CPT

## 2023-12-05 PROCEDURE — 96375 TX/PRO/DX INJ NEW DRUG ADDON: CPT

## 2023-12-05 PROCEDURE — 72125 CT NECK SPINE W/O DYE: CPT

## 2023-12-05 PROCEDURE — 87088 URINE BACTERIA CULTURE: CPT

## 2023-12-05 PROCEDURE — 99285 EMERGENCY DEPT VISIT HI MDM: CPT

## 2023-12-05 PROCEDURE — 81001 URINALYSIS AUTO W/SCOPE: CPT

## 2023-12-05 PROCEDURE — 73080 X-RAY EXAM OF ELBOW: CPT

## 2023-12-05 PROCEDURE — 96374 THER/PROPH/DIAG INJ IV PUSH: CPT

## 2023-12-05 PROCEDURE — 72192 CT PELVIS W/O DYE: CPT

## 2023-12-05 PROCEDURE — 73590 X-RAY EXAM OF LOWER LEG: CPT

## 2023-12-05 PROCEDURE — 85025 COMPLETE CBC W/AUTO DIFF WBC: CPT

## 2023-12-05 PROCEDURE — 6360000002 HC RX W HCPCS: Performed by: STUDENT IN AN ORGANIZED HEALTH CARE EDUCATION/TRAINING PROGRAM

## 2023-12-05 PROCEDURE — 73030 X-RAY EXAM OF SHOULDER: CPT

## 2023-12-05 PROCEDURE — 99222 1ST HOSP IP/OBS MODERATE 55: CPT | Performed by: STUDENT IN AN ORGANIZED HEALTH CARE EDUCATION/TRAINING PROGRAM

## 2023-12-05 PROCEDURE — 87635 SARS-COV-2 COVID-19 AMP PRB: CPT

## 2023-12-05 RX ORDER — INSULIN LISPRO 100 [IU]/ML
0-4 INJECTION, SOLUTION INTRAVENOUS; SUBCUTANEOUS NIGHTLY
Status: DISCONTINUED | OUTPATIENT
Start: 2023-12-05 | End: 2023-12-10 | Stop reason: SDUPTHER

## 2023-12-05 RX ORDER — DEXTROSE MONOHYDRATE 100 MG/ML
INJECTION, SOLUTION INTRAVENOUS CONTINUOUS PRN
Status: DISCONTINUED | OUTPATIENT
Start: 2023-12-05 | End: 2023-12-10 | Stop reason: HOSPADM

## 2023-12-05 RX ORDER — INSULIN LISPRO 100 [IU]/ML
0-4 INJECTION, SOLUTION INTRAVENOUS; SUBCUTANEOUS
Status: DISCONTINUED | OUTPATIENT
Start: 2023-12-06 | End: 2023-12-10

## 2023-12-05 RX ORDER — CALCIUM GLUCONATE 94 MG/ML
1000 INJECTION, SOLUTION INTRAVENOUS ONCE
Status: COMPLETED | OUTPATIENT
Start: 2023-12-05 | End: 2023-12-05

## 2023-12-05 RX ORDER — SODIUM CHLORIDE 9 MG/ML
INJECTION, SOLUTION INTRAVENOUS CONTINUOUS
Status: DISCONTINUED | OUTPATIENT
Start: 2023-12-05 | End: 2023-12-10 | Stop reason: HOSPADM

## 2023-12-05 RX ORDER — 0.9 % SODIUM CHLORIDE 0.9 %
1000 INTRAVENOUS SOLUTION INTRAVENOUS ONCE
Status: COMPLETED | OUTPATIENT
Start: 2023-12-05 | End: 2023-12-06

## 2023-12-05 RX ORDER — 0.9 % SODIUM CHLORIDE 0.9 %
1000 INTRAVENOUS SOLUTION INTRAVENOUS ONCE
Status: COMPLETED | OUTPATIENT
Start: 2023-12-05 | End: 2023-12-05

## 2023-12-05 RX ORDER — KETOROLAC TROMETHAMINE 30 MG/ML
15 INJECTION, SOLUTION INTRAMUSCULAR; INTRAVENOUS ONCE
Status: COMPLETED | OUTPATIENT
Start: 2023-12-05 | End: 2023-12-05

## 2023-12-05 RX ORDER — METHOCARBAMOL 750 MG/1
750 TABLET, FILM COATED ORAL ONCE
Status: COMPLETED | OUTPATIENT
Start: 2023-12-05 | End: 2023-12-05

## 2023-12-05 RX ADMIN — WATER 1000 MG: 1 INJECTION INTRAMUSCULAR; INTRAVENOUS; SUBCUTANEOUS at 22:39

## 2023-12-05 RX ADMIN — METHOCARBAMOL TABLETS 750 MG: 750 TABLET, COATED ORAL at 19:18

## 2023-12-05 RX ADMIN — SODIUM CHLORIDE 1000 ML: 9 INJECTION, SOLUTION INTRAVENOUS at 18:00

## 2023-12-05 RX ADMIN — CALCIUM GLUCONATE 1000 MG: 98 INJECTION, SOLUTION INTRAVENOUS at 22:31

## 2023-12-05 RX ADMIN — SODIUM CHLORIDE 1000 ML: 9 INJECTION, SOLUTION INTRAVENOUS at 22:34

## 2023-12-05 RX ADMIN — KETOROLAC TROMETHAMINE 15 MG: 30 INJECTION, SOLUTION INTRAMUSCULAR; INTRAVENOUS at 19:19

## 2023-12-05 ASSESSMENT — PAIN DESCRIPTION - LOCATION: LOCATION: ARM;LEG

## 2023-12-05 ASSESSMENT — PAIN DESCRIPTION - ORIENTATION: ORIENTATION: RIGHT

## 2023-12-05 ASSESSMENT — PAIN SCALES - GENERAL: PAINLEVEL_OUTOF10: 9

## 2023-12-05 NOTE — ED PROVIDER NOTES
[facility] EMERGENCY DEPARTMENT  EMERGENCY DEPARTMENT ENCOUNTER        Pt Name: Steph Gonzáles  MRN: [id number]  Birthdate 1955  Date of evaluation: 12/5/2023  Provider: Kathie Cooper DO  PCP: Vince Beatty APRN - CNP  Note Started: 5:11 PM EST 12/5/23    CHIEF COMPLAINT       Chief Complaint   Patient presents with    Fall     Pt arrives by ems from home c/o fall slip /trip while walking with walker x 2 days ago. States no loc. Rt side pain arm/rib/leg 9/10 pain       HISTORY OF PRESENT ILLNESS: 1 or more Elements   History From: Patient    Limitations to history : None    Steph Gonzáles is a 67 y.o. female with past medical history of obesity neuropathy, hyperlipidemia, diabetes, asthma, COPD on home oxygen at night, CKD and cerebellar infarct who presents to the emergency department status post fall.  Patient came by EMS from home for evaluation after a fall 2 days ago.  She was apparently walking with her walker into the doctor's office when she fell.  Patient fell onto her right side but has been having worsening pain since her fall.  She denies any head trauma or loss of consciousness.  Patient states that she is on aspirin but no blood thinners.  Patient has not been able to ambulate as well because of her pain.  She has not really tried much in terms of pain medication at home.  She does use a walker to get around at baseline.  She has pain over her whole right side but denies pain anywhere else.  She had no prodromal symptoms before falling.  Patient has no other symptoms otherwise including nausea, vomiting, fever, chills, headache, lightheadedness, dizziness, syncope, shortness of breath, abdominal pain, urinary or bowel symptoms.  On initial assessment, patient is well-appearing and in no acute distress.    Nursing Notes were all reviewed and agreed with or any disagreements were addressed in the HPI.    ROS:   Pertinent positives and negatives are  Admit to telemetry  Patient condition is stable        NOTE: This report was transcribed using voice recognition software.  Every effort was made to ensure accuracy; however, inadvertent computerized transcription errors may be present         DO Phuc Cespedes  12/05/23 7888

## 2023-12-05 NOTE — TELEPHONE ENCOUNTER
Called dental spoke with [facility] patient received a temporary partial in November jen is schedule for teeth extraction on 12/14/23 surgeon will further consult after extraction and make permanent partial. [facility] will send itemized statement and recommendations to fax [phone] after 12/14/23.

## 2023-12-06 LAB
ANION GAP SERPL CALCULATED.3IONS-SCNC: 9 MMOL/L (ref 7–16)
BASOPHILS # BLD: 0.01 K/UL (ref 0–0.2)
BASOPHILS NFR BLD: 0 % (ref 0–2)
BUN SERPL-MCNC: 42 MG/DL (ref 6–23)
CALCIUM SERPL-MCNC: 8.5 MG/DL (ref 8.6–10.2)
CHLORIDE SERPL-SCNC: 102 MMOL/L (ref 98–107)
CO2 SERPL-SCNC: 22 MMOL/L (ref 22–29)
CREAT SERPL-MCNC: 2 MG/DL (ref 0.5–1)
EOSINOPHIL # BLD: 0.04 K/UL (ref 0.05–0.5)
EOSINOPHILS RELATIVE PERCENT: 1 % (ref 0–6)
ERYTHROCYTE [DISTWIDTH] IN BLOOD BY AUTOMATED COUNT: 12.2 % (ref 11.5–15)
GFR SERPL CREATININE-BSD FRML MDRD: 27 ML/MIN/1.73M2
GLUCOSE BLD-MCNC: 102 MG/DL (ref 74–99)
GLUCOSE BLD-MCNC: 116 MG/DL (ref 74–99)
GLUCOSE BLD-MCNC: 165 MG/DL (ref 74–99)
GLUCOSE BLD-MCNC: 196 MG/DL (ref 74–99)
GLUCOSE BLD-MCNC: 93 MG/DL (ref 74–99)
GLUCOSE SERPL-MCNC: 119 MG/DL (ref 74–99)
HBA1C MFR BLD: 6.3 % (ref 4–5.6)
HCT VFR BLD AUTO: 31.6 % (ref 34–48)
HGB BLD-MCNC: 10.1 G/DL (ref 11.5–15.5)
IMM GRANULOCYTES # BLD AUTO: <0.03 K/UL (ref 0–0.58)
IMM GRANULOCYTES NFR BLD: 0 % (ref 0–5)
LYMPHOCYTES NFR BLD: 0.18 K/UL (ref 1.5–4)
LYMPHOCYTES RELATIVE PERCENT: 4 % (ref 20–42)
MCH RBC QN AUTO: 31.8 PG (ref 26–35)
MCHC RBC AUTO-ENTMCNC: 32 G/DL (ref 32–34.5)
MCV RBC AUTO: 99.4 FL (ref 80–99.9)
MONOCYTES NFR BLD: 0.31 K/UL (ref 0.1–0.95)
MONOCYTES NFR BLD: 7 % (ref 2–12)
NEUTROPHILS NFR BLD: 88 % (ref 43–80)
NEUTS SEG NFR BLD: 4 K/UL (ref 1.8–7.3)
PLATELET # BLD AUTO: 161 K/UL (ref 130–450)
PMV BLD AUTO: 10.9 FL (ref 7–12)
POTASSIUM SERPL-SCNC: 5.8 MMOL/L (ref 3.5–5)
RBC # BLD AUTO: 3.18 M/UL (ref 3.5–5.5)
RBC # BLD: ABNORMAL 10*6/UL
RBC # BLD: ABNORMAL 10*6/UL
SODIUM SERPL-SCNC: 133 MMOL/L (ref 132–146)
WBC OTHER # BLD: 4.6 K/UL (ref 4.5–11.5)

## 2023-12-06 PROCEDURE — 6370000000 HC RX 637 (ALT 250 FOR IP): Performed by: STUDENT IN AN ORGANIZED HEALTH CARE EDUCATION/TRAINING PROGRAM

## 2023-12-06 PROCEDURE — 94660 CPAP INITIATION&MGMT: CPT

## 2023-12-06 PROCEDURE — 99232 SBSQ HOSP IP/OBS MODERATE 35: CPT | Performed by: INTERNAL MEDICINE

## 2023-12-06 PROCEDURE — 2500000003 HC RX 250 WO HCPCS: Performed by: INTERNAL MEDICINE

## 2023-12-06 PROCEDURE — 85025 COMPLETE CBC W/AUTO DIFF WBC: CPT

## 2023-12-06 PROCEDURE — 94640 AIRWAY INHALATION TREATMENT: CPT

## 2023-12-06 PROCEDURE — 2580000003 HC RX 258: Performed by: INTERNAL MEDICINE

## 2023-12-06 PROCEDURE — 6360000002 HC RX W HCPCS: Performed by: NURSE PRACTITIONER

## 2023-12-06 PROCEDURE — 83036 HEMOGLOBIN GLYCOSYLATED A1C: CPT

## 2023-12-06 PROCEDURE — 6370000000 HC RX 637 (ALT 250 FOR IP): Performed by: INTERNAL MEDICINE

## 2023-12-06 PROCEDURE — 1200000000 HC SEMI PRIVATE

## 2023-12-06 PROCEDURE — 82962 GLUCOSE BLOOD TEST: CPT

## 2023-12-06 PROCEDURE — 2700000000 HC OXYGEN THERAPY PER DAY

## 2023-12-06 PROCEDURE — 6360000002 HC RX W HCPCS: Performed by: STUDENT IN AN ORGANIZED HEALTH CARE EDUCATION/TRAINING PROGRAM

## 2023-12-06 PROCEDURE — 97161 PT EVAL LOW COMPLEX 20 MIN: CPT

## 2023-12-06 PROCEDURE — 97165 OT EVAL LOW COMPLEX 30 MIN: CPT

## 2023-12-06 PROCEDURE — 80048 BASIC METABOLIC PNL TOTAL CA: CPT

## 2023-12-06 PROCEDURE — 2580000003 HC RX 258: Performed by: STUDENT IN AN ORGANIZED HEALTH CARE EDUCATION/TRAINING PROGRAM

## 2023-12-06 RX ORDER — POLYETHYLENE GLYCOL 3350 17 G/17G
17 POWDER, FOR SOLUTION ORAL DAILY PRN
Status: DISCONTINUED | OUTPATIENT
Start: 2023-12-06 | End: 2023-12-10 | Stop reason: HOSPADM

## 2023-12-06 RX ORDER — TRAZODONE HYDROCHLORIDE 50 MG/1
100 TABLET ORAL NIGHTLY
Status: DISCONTINUED | OUTPATIENT
Start: 2023-12-06 | End: 2023-12-10 | Stop reason: HOSPADM

## 2023-12-06 RX ORDER — ENOXAPARIN SODIUM 100 MG/ML
30 INJECTION SUBCUTANEOUS DAILY
Status: DISCONTINUED | OUTPATIENT
Start: 2023-12-06 | End: 2023-12-10 | Stop reason: HOSPADM

## 2023-12-06 RX ORDER — FAMOTIDINE 20 MG/1
40 TABLET, FILM COATED ORAL DAILY
Status: DISCONTINUED | OUTPATIENT
Start: 2023-12-06 | End: 2023-12-10

## 2023-12-06 RX ORDER — ACETAMINOPHEN 325 MG/1
650 TABLET ORAL EVERY 6 HOURS PRN
Status: DISCONTINUED | OUTPATIENT
Start: 2023-12-06 | End: 2023-12-10 | Stop reason: HOSPADM

## 2023-12-06 RX ORDER — SODIUM CHLORIDE 0.9 % (FLUSH) 0.9 %
5-40 SYRINGE (ML) INJECTION EVERY 12 HOURS SCHEDULED
Status: DISCONTINUED | OUTPATIENT
Start: 2023-12-06 | End: 2023-12-10 | Stop reason: HOSPADM

## 2023-12-06 RX ORDER — ATORVASTATIN CALCIUM 20 MG/1
20 TABLET, FILM COATED ORAL DAILY
Status: DISCONTINUED | OUTPATIENT
Start: 2023-12-06 | End: 2023-12-06 | Stop reason: CLARIF

## 2023-12-06 RX ORDER — CALCIUM GLUCONATE 94 MG/ML
1000 INJECTION, SOLUTION INTRAVENOUS ONCE
Status: DISCONTINUED | OUTPATIENT
Start: 2023-12-06 | End: 2023-12-06 | Stop reason: ALTCHOICE

## 2023-12-06 RX ORDER — BUDESONIDE AND FORMOTEROL FUMARATE DIHYDRATE 160; 4.5 UG/1; UG/1
2 AEROSOL RESPIRATORY (INHALATION)
Status: DISCONTINUED | OUTPATIENT
Start: 2023-12-06 | End: 2023-12-06 | Stop reason: CLARIF

## 2023-12-06 RX ORDER — ONDANSETRON 4 MG/1
4 TABLET, ORALLY DISINTEGRATING ORAL EVERY 8 HOURS PRN
Status: DISCONTINUED | OUTPATIENT
Start: 2023-12-06 | End: 2023-12-10 | Stop reason: HOSPADM

## 2023-12-06 RX ORDER — LABETALOL HYDROCHLORIDE 5 MG/ML
10 INJECTION, SOLUTION INTRAVENOUS ONCE
Status: COMPLETED | OUTPATIENT
Start: 2023-12-06 | End: 2023-12-06

## 2023-12-06 RX ORDER — CALCIUM GLUCONATE 20 MG/ML
1000 INJECTION, SOLUTION INTRAVENOUS ONCE
Status: DISCONTINUED | OUTPATIENT
Start: 2023-12-06 | End: 2023-12-10 | Stop reason: HOSPADM

## 2023-12-06 RX ORDER — OXYBUTYNIN CHLORIDE 10 MG/1
10 TABLET, EXTENDED RELEASE ORAL DAILY
Status: DISCONTINUED | OUTPATIENT
Start: 2023-12-06 | End: 2023-12-10 | Stop reason: HOSPADM

## 2023-12-06 RX ORDER — LIDOCAINE 4 G/G
1 PATCH TOPICAL DAILY
Status: DISCONTINUED | OUTPATIENT
Start: 2023-12-06 | End: 2023-12-10 | Stop reason: HOSPADM

## 2023-12-06 RX ORDER — SIMVASTATIN 20 MG
20 TABLET ORAL NIGHTLY
Status: DISCONTINUED | OUTPATIENT
Start: 2023-12-06 | End: 2023-12-10 | Stop reason: HOSPADM

## 2023-12-06 RX ORDER — FLUTICASONE PROPIONATE 50 MCG
2 SPRAY, SUSPENSION (ML) NASAL DAILY
Status: DISCONTINUED | OUTPATIENT
Start: 2023-12-06 | End: 2023-12-10 | Stop reason: HOSPADM

## 2023-12-06 RX ORDER — ARFORMOTEROL TARTRATE 15 UG/2ML
15 SOLUTION RESPIRATORY (INHALATION)
Status: DISCONTINUED | OUTPATIENT
Start: 2023-12-06 | End: 2023-12-10 | Stop reason: HOSPADM

## 2023-12-06 RX ORDER — ONDANSETRON 2 MG/ML
4 INJECTION INTRAMUSCULAR; INTRAVENOUS EVERY 6 HOURS PRN
Status: DISCONTINUED | OUTPATIENT
Start: 2023-12-06 | End: 2023-12-10 | Stop reason: HOSPADM

## 2023-12-06 RX ORDER — BACLOFEN 10 MG/1
10 TABLET ORAL 2 TIMES DAILY
Status: DISCONTINUED | OUTPATIENT
Start: 2023-12-06 | End: 2023-12-10 | Stop reason: HOSPADM

## 2023-12-06 RX ORDER — ACETAMINOPHEN 650 MG/1
650 SUPPOSITORY RECTAL EVERY 6 HOURS PRN
Status: DISCONTINUED | OUTPATIENT
Start: 2023-12-06 | End: 2023-12-10 | Stop reason: HOSPADM

## 2023-12-06 RX ORDER — DULOXETIN HYDROCHLORIDE 30 MG/1
60 CAPSULE, DELAYED RELEASE ORAL DAILY
Status: DISCONTINUED | OUTPATIENT
Start: 2023-12-06 | End: 2023-12-10 | Stop reason: HOSPADM

## 2023-12-06 RX ORDER — GABAPENTIN 100 MG/1
100 CAPSULE ORAL 2 TIMES DAILY
Status: DISCONTINUED | OUTPATIENT
Start: 2023-12-06 | End: 2023-12-10 | Stop reason: HOSPADM

## 2023-12-06 RX ORDER — POTASSIUM CHLORIDE 7.45 MG/ML
10 INJECTION INTRAVENOUS PRN
Status: DISCONTINUED | OUTPATIENT
Start: 2023-12-06 | End: 2023-12-06

## 2023-12-06 RX ORDER — POTASSIUM CHLORIDE 20 MEQ/1
40 TABLET, EXTENDED RELEASE ORAL PRN
Status: DISCONTINUED | OUTPATIENT
Start: 2023-12-06 | End: 2023-12-06

## 2023-12-06 RX ORDER — MAGNESIUM SULFATE IN WATER 40 MG/ML
2000 INJECTION, SOLUTION INTRAVENOUS PRN
Status: DISCONTINUED | OUTPATIENT
Start: 2023-12-06 | End: 2023-12-06

## 2023-12-06 RX ORDER — DOCUSATE SODIUM 100 MG/1
200 CAPSULE, LIQUID FILLED ORAL NIGHTLY
Status: DISCONTINUED | OUTPATIENT
Start: 2023-12-06 | End: 2023-12-10 | Stop reason: HOSPADM

## 2023-12-06 RX ORDER — SODIUM CHLORIDE 9 MG/ML
INJECTION, SOLUTION INTRAVENOUS PRN
Status: DISCONTINUED | OUTPATIENT
Start: 2023-12-06 | End: 2023-12-10 | Stop reason: HOSPADM

## 2023-12-06 RX ORDER — ENOXAPARIN SODIUM 100 MG/ML
40 INJECTION SUBCUTANEOUS DAILY
Status: DISCONTINUED | OUTPATIENT
Start: 2023-12-06 | End: 2023-12-06 | Stop reason: DRUGHIGH

## 2023-12-06 RX ORDER — SODIUM CHLORIDE 0.9 % (FLUSH) 0.9 %
5-40 SYRINGE (ML) INJECTION PRN
Status: DISCONTINUED | OUTPATIENT
Start: 2023-12-06 | End: 2023-12-10 | Stop reason: HOSPADM

## 2023-12-06 RX ORDER — ASPIRIN 81 MG/1
81 TABLET, CHEWABLE ORAL DAILY
Status: DISCONTINUED | OUTPATIENT
Start: 2023-12-06 | End: 2023-12-10 | Stop reason: HOSPADM

## 2023-12-06 RX ORDER — BUDESONIDE 0.5 MG/2ML
0.5 INHALANT ORAL
Status: DISCONTINUED | OUTPATIENT
Start: 2023-12-06 | End: 2023-12-10 | Stop reason: HOSPADM

## 2023-12-06 RX ORDER — MONTELUKAST SODIUM 10 MG/1
10 TABLET ORAL NIGHTLY
Status: DISCONTINUED | OUTPATIENT
Start: 2023-12-06 | End: 2023-12-10 | Stop reason: HOSPADM

## 2023-12-06 RX ORDER — CETIRIZINE HYDROCHLORIDE 10 MG/1
10 TABLET ORAL DAILY
Status: DISCONTINUED | OUTPATIENT
Start: 2023-12-06 | End: 2023-12-10 | Stop reason: HOSPADM

## 2023-12-06 RX ADMIN — ARFORMOTEROL TARTRATE 15 MCG: 15 SOLUTION RESPIRATORY (INHALATION) at 10:03

## 2023-12-06 RX ADMIN — SODIUM CHLORIDE: 9 INJECTION, SOLUTION INTRAVENOUS at 04:14

## 2023-12-06 RX ADMIN — TRAZODONE HYDROCHLORIDE 100 MG: 50 TABLET ORAL at 21:36

## 2023-12-06 RX ADMIN — MONTELUKAST SODIUM 10 MG: 10 TABLET ORAL at 21:35

## 2023-12-06 RX ADMIN — BACLOFEN 10 MG: 10 TABLET ORAL at 02:10

## 2023-12-06 RX ADMIN — BACLOFEN 10 MG: 10 TABLET ORAL at 21:36

## 2023-12-06 RX ADMIN — SODIUM ZIRCONIUM CYCLOSILICATE 10 G: 10 POWDER, FOR SUSPENSION ORAL at 10:44

## 2023-12-06 RX ADMIN — MONTELUKAST SODIUM 10 MG: 10 TABLET ORAL at 02:10

## 2023-12-06 RX ADMIN — GABAPENTIN 100 MG: 100 CAPSULE ORAL at 10:40

## 2023-12-06 RX ADMIN — LABETALOL HYDROCHLORIDE 10 MG: 5 INJECTION, SOLUTION INTRAVENOUS at 21:44

## 2023-12-06 RX ADMIN — DULOXETINE HYDROCHLORIDE 60 MG: 60 CAPSULE, DELAYED RELEASE ORAL at 10:40

## 2023-12-06 RX ADMIN — FLUTICASONE PROPIONATE 2 SPRAY: 50 SPRAY, METERED NASAL at 10:39

## 2023-12-06 RX ADMIN — DOCUSATE SODIUM 200 MG: 100 CAPSULE, LIQUID FILLED ORAL at 21:35

## 2023-12-06 RX ADMIN — BUDESONIDE INHALATION 500 MCG: 0.5 SUSPENSION RESPIRATORY (INHALATION) at 10:03

## 2023-12-06 RX ADMIN — SODIUM CHLORIDE, PRESERVATIVE FREE 10 ML: 5 INJECTION INTRAVENOUS at 13:13

## 2023-12-06 RX ADMIN — INSULIN HUMAN 10 UNITS: 100 INJECTION, SOLUTION PARENTERAL at 13:19

## 2023-12-06 RX ADMIN — GABAPENTIN 100 MG: 100 CAPSULE ORAL at 02:10

## 2023-12-06 RX ADMIN — WATER 1000 MG: 1 INJECTION INTRAMUSCULAR; INTRAVENOUS; SUBCUTANEOUS at 21:36

## 2023-12-06 RX ADMIN — ASPIRIN 81 MG CHEWABLE TABLET 81 MG: 81 TABLET CHEWABLE at 10:40

## 2023-12-06 RX ADMIN — DOCUSATE SODIUM 200 MG: 100 CAPSULE, LIQUID FILLED ORAL at 02:10

## 2023-12-06 RX ADMIN — OXYBUTYNIN CHLORIDE 10 MG: 10 TABLET, EXTENDED RELEASE ORAL at 10:40

## 2023-12-06 RX ADMIN — ARFORMOTEROL TARTRATE 15 MCG: 15 SOLUTION RESPIRATORY (INHALATION) at 20:38

## 2023-12-06 RX ADMIN — ENOXAPARIN SODIUM 30 MG: 100 INJECTION SUBCUTANEOUS at 10:41

## 2023-12-06 RX ADMIN — BUDESONIDE INHALATION 500 MCG: 0.5 SUSPENSION RESPIRATORY (INHALATION) at 20:38

## 2023-12-06 RX ADMIN — SODIUM CHLORIDE, PRESERVATIVE FREE 10 ML: 5 INJECTION INTRAVENOUS at 21:44

## 2023-12-06 RX ADMIN — DEXTROSE MONOHYDRATE 250 ML: 100 INJECTION, SOLUTION INTRAVENOUS at 13:24

## 2023-12-06 RX ADMIN — SODIUM CHLORIDE: 9 INJECTION, SOLUTION INTRAVENOUS at 14:50

## 2023-12-06 RX ADMIN — SODIUM ZIRCONIUM CYCLOSILICATE 10 G: 10 POWDER, FOR SUSPENSION ORAL at 13:28

## 2023-12-06 RX ADMIN — BACLOFEN 10 MG: 10 TABLET ORAL at 10:40

## 2023-12-06 RX ADMIN — CETIRIZINE HYDROCHLORIDE 10 MG: 10 TABLET, FILM COATED ORAL at 10:40

## 2023-12-06 RX ADMIN — ACETAMINOPHEN 650 MG: 325 TABLET ORAL at 15:33

## 2023-12-06 RX ADMIN — FAMOTIDINE 40 MG: 20 TABLET ORAL at 10:40

## 2023-12-06 RX ADMIN — GABAPENTIN 100 MG: 100 CAPSULE ORAL at 21:35

## 2023-12-06 ASSESSMENT — PAIN SCALES - GENERAL
PAINLEVEL_OUTOF10: 2
PAINLEVEL_OUTOF10: 8

## 2023-12-06 ASSESSMENT — PAIN DESCRIPTION - ORIENTATION: ORIENTATION: RIGHT

## 2023-12-06 NOTE — PLAN OF CARE
Problem: Discharge Planning  Goal: Discharge to home or other facility with appropriate resources  Outcome: Progressing  Flowsheets (Taken 12/6/2023 0123)  Discharge to home or other facility with appropriate resources: Identify barriers to discharge with patient and caregiver

## 2023-12-06 NOTE — H&P
MG tablet TAKE ONE TABLET DAILY 1/2 HOUR BEFORE DINNER 10/6/22   Miranda Raya MD   BD ULTRA-FINE PEN NEEDLES 29G X 12.7MM MISC USE AS DIRECTED 3 TIMES A DAY 10/14/22   Miranda Raya MD   oxybutynin (DITROPAN-XL) 10 MG extended release tablet Take 1 tablet by mouth daily    Miranda Raya MD   BREO ELLIPTA 100-25 MCG/INH AEPB inhaler Inhale 1 puff into the lungs daily 8/11/22   Miranda Raya MD   aspirin 81 MG chewable tablet Take 1 tablet by mouth daily 7/6/22   Owen Gerard MD   DULoxetine (CYMBALTA) 60 MG extended release capsule Take 1 capsule by mouth daily Patient only has supply to take once a day    Miranda Raya MD   gabapentin (NEURONTIN) 100 MG capsule Take 1 capsule by mouth 2 times daily. Miranda Raya MD   Insulin Degludec (TRESIBA FLEXTOUCH) 200 UNIT/ML SOPN Inject 10 Units into the skin at bedtime    Miranda Raya MD   montelukast (SINGULAIR) 10 MG tablet Take 1 tablet by mouth nightly    Miranda Raya MD   traZODone (DESYREL) 100 MG tablet Take 1 tablet by mouth nightly    Miranda Raya MD   vitamin D (CHOLECALCIFEROL) 25 MCG (1000 UT) TABS tablet Take 1 tablet by mouth daily    Miranda Raya MD   docusate sodium (COLACE) 100 MG capsule Take 2 capsules by mouth at bedtime 3/11/21   Miranda Raya MD       Allergies:    Latex, Bee venom, Dilaudid [hydromorphone hcl], Dye [iodides], Keflex [cephalexin], Acetaminophen, Bee pollen, Lasix [furosemide], Levaquin [levofloxacin in d5w], Lipitor, Lyrica [pregabalin], Morphine, Naproxen, Nefazodone, Norvasc [amlodipine], Oxycodone-acetaminophen, Shellfish-derived products, and Trazodone and nefazodone    Social History:    reports that she has been smoking cigarettes. She started smoking about 49 years ago. She has a 20.00 pack-year smoking history. She has never used smokeless tobacco. She reports that she does not currently use alcohol.  She reports current drug use. Drug: Marijuana (Weed).    Family History:   family history includes Asthma in her father; Breast Cancer (age of onset: 74) in her maternal aunt; Cancer in her mother; Colon Cancer in her brother and mother.       PHYSICAL EXAM:  Vitals:  BP (!) 113/55   Pulse 77   Temp 97.7 °F (36.5 °C) (Temporal)   Resp 16   LMP 01/01/1990   SpO2 100%     General Appearance: alert and oriented to person, place and time and in no acute distress, obese  Skin: warm and dry  Head: normocephalic and atraumatic  Eyes: pupils equal, round, and reactive to light, extraocular eye movements intact, conjunctivae normal  Neck: neck supple and non tender without mass   Pulmonary/Chest: clear to auscultation bilaterally- no wheezes, rales or rhonchi, normal air movement, no respiratory distress  Cardiovascular: normal rate, normal S1 and S2 and no carotid bruits  Abdomen: soft, non-tender, non-distended, normal bowel sounds, no masses or organomegaly  Extremities: no cyanosis, no clubbing and no edema  Neurologic: no cranial nerve deficit and speech normal        LABS:  Recent Labs     12/05/23  1823   *   K 5.6*   CL 96*   CO2 21*   BUN 40*   CREATININE 2.0*   GLUCOSE 180*   CALCIUM 8.4*       Recent Labs     12/05/23  1823   WBC 5.4   RBC 3.57   HGB 11.4*   HCT 34.8   MCV 97.5   MCH 31.9   MCHC 32.8   RDW 12.2      MPV 11.3       No results for input(s): \"POCGLU\" in the last 72 hours.        Radiology:   CT PELVIS WO CONTRAST Additional Contrast? None   Final Result   No acute osseous abnormality.         CT CHEST WO CONTRAST   Final Result   No acute displaced rib fracture.  Small right pleural effusion.         XR HIP 2-3 VW W PELVIS RIGHT   Final Result   No acute abnormality of the pelvis and right hip.         XR CHEST PORTABLE   Final Result   No acute process.      Cardiomegaly, status post median sternotomy.         XR SHOULDER RIGHT (MIN 2 VIEWS)   Final Result   No acute abnormality.         XR ELBOW RIGHT

## 2023-12-06 NOTE — PROGRESS NOTES
Consulted for new piv. IV Team placed one last night and it failed already. Attempted piv three times finally with success in right forearm. Pt has very limited vasculature. Pt not a candidate for PICC or midline as veins are too deep and too small. Would recommend TLC CVC if further need for infusions or multiple lab draws if this current line should fail. Floor nurse notified.

## 2023-12-06 NOTE — PROGRESS NOTES
Physical Therapy  Facility/Department: [facility] MED SURG/TELE  Physical Therapy Initial Assessment    Name: Steph Gonzáles  : 1955  MRN: [id number]  Date of Service: 2023    Patient Diagnosis(es): The primary encounter diagnosis was Fall, initial encounter. Diagnoses of Ambulatory dysfunction, Acute on chronic renal insufficiency, Hyperkalemia, and Acute cystitis without hematuria were also pertinent to this visit.  Past Medical History:  has a past medical history of Asthma, Atherosclerosis of native artery of left leg with rest pain (HCC), CAD (coronary artery disease), Cellulitis and abscess of trunk, Cerebellar infarct (HCC), Chronic back pain, Chronic kidney disease, Chronic systolic congestive heart failure (HCC), Chronic venous insufficiency, COPD (chronic obstructive pulmonary disease) (HCC), COVID-19, Depression, Diabetes mellitus (HCC), Diabetic neuropathy (HCC), Diverticulosis, Fatty liver, Glaucoma, open angle, Hepatic encephalopathy (HCC), Hiatal hernia, History of blood transfusion, Hyperlipidemia, Hyperplastic colon polyp, Hypertension, Incontinence, Liver mass, Morbid obesity (HCC), Movement disorder, Myocardial infarction (HCC), O2 dependent, Osteoarthritis, generalized, Pain in both lower legs, Peripheral vascular disease (HCC), Pneumonia, Pulmonary edema, PVD (peripheral vascular disease) with claudication (HCC), Sleep apnea, Status post peripheral artery angioplasty, Tobacco abuse, Tobacco abuse, Tobacco dependence, Tubular adenoma polyp of rectum, Urinary tract infection due to ESBL Klebsiella, and Ventral hernia.  Past Surgical History:  has a past surgical history that includes knee surgery; Upper gastrointestinal endoscopy (12); Coronary artery bypass graft; Layered wound closure (Left, [id number]); Echo Complete (10/9/2013); polysomnography (2016); liver biopsy (2016); bronchial brush biopsy (2013); Breast surgery (); Cholecystectomy (YRS AGO); Cardiac  surgery (12/2011); Cardiac catheterization (04/20/2015); Hysterectomy; joint replacement (2011); Upper gastrointestinal endoscopy (12/23/2016); Colonoscopy (11/15/2013); Colonoscopy (12/23/2016); Upper gastrointestinal endoscopy (02/08/2017); Endoscopy, colon, diagnostic (04/18/2017); Gallbladder surgery; angioplasty (11/13/2018); laryngoscopy (N/A, 4/4/2022); and laryngoscopy (Bilateral, 10/4/2023). Referring provider:  Ru Thakur MD    PT Order:  PT eval and treat     Evaluating PT:  Candie Hewitt PT, DPT PT [id number]    Room #:  2178/3553-N  Diagnosis:  Hyperkalemia [E87.5]  Acute on chronic renal insufficiency [N28.9, N18.9]  ALEC (acute kidney injury) ([address]) [N17.9]  Acute cystitis without hematuria [N30.00]  Fall, initial encounter [W19. XXXA]  Ambulatory dysfunction [R26.2]  Precautions:  fall risk  Equipment Needs:  none. Pt reported owing a rollator. SUBJECTIVE:    Pt lives with her sister and grandson in a 1 story home with 4 or 3 stairs to enter or ramp to enter. Pt reported she is supposed to get a new ramp. She had fallen on the ramp she has now. Bed and bath is on first floor. Pt ambulated with rollator PTA. OBJECTIVE:   Initial Evaluation  Date: 12/6 Treatment Short Term/ Long Term   Goals   Was pt agreeable to Eval/treatment? yes     Does pt have pain?  Right side of her body     Bed Mobility  Rolling: SBA  Supine to sit: SBA  Sit to supine: SBA  Scooting: SBA to sitting EOB  independent   Transfers Sit to stand: SBA  Stand to sit: SBA  Stand pivot: NT  Modified independent   Ambulation    15 feet x 2 with w/w SBA   150+ feet with w/w modified independent    Stair negotiation: ascended and descended  NT      ROM BLE:  WFL     Strength BLE:  grossly 4/5  Grossly 4+/5   Balance Sitting EOB:  independent  Dynamic Standing:  SBA with w/w  Sitting EOB:  independent  Dynamic Standing:  modified independent with w/w   AM-PAC 6 Clicks 79/73       Pt is A & O x 3  Sensation:  Pt denies numbness

## 2023-12-06 NOTE — DISCHARGE INSTRUCTIONS

## 2023-12-06 NOTE — PLAN OF CARE
Patient's chart updated to reflect:      . - HF care plan, HF education points and HF discharge instructions.  -Orders: 2 gram sodium diet, daily weights, I/O.  -PCP and cardiology follow up appointments to be scheduled within 7 days of hospital discharge. -CHF education session will be provided to the patient prior to hospital discharge.     Isai Boone RN   Heart Failure Navigator

## 2023-12-06 NOTE — ED NOTES
Pt moved from tinsley 10 to room 25, attached to tele monitor and bp, spo2. Pt refuses gown when this rn attempts to get pt changed.       Gavino Sandhu LPN  01/46/39 0725

## 2023-12-06 NOTE — PLAN OF CARE
Problem: Discharge Planning  Goal: Discharge to home or other facility with appropriate resources  12/6/2023 1242 by Jennifer Machado RN  Outcome: Progressing  12/6/2023 0124 by Marciano Bustos  Outcome: Progressing  Flowsheets  Taken 12/6/2023 0123  Discharge to home or other facility with appropriate resources: Identify barriers to discharge with patient and caregiver  Taken 12/6/2023 0055  Discharge to home or other facility with appropriate resources: Identify barriers to discharge with patient and caregiver     Problem: Safety - Adult  Goal: Free from fall injury  Outcome: Progressing

## 2023-12-06 NOTE — PROGRESS NOTES
Sung De Paz was ordered simvastatin [facility]) which is a nonformulary medication. This medication will need to be supplied by the patient as the pharmacy does not carry this non-formulary medication. If the medication has not been administered by 1400 on the following day from the time the order was placed, a pharmacist will follow-up with the nurse of the patient to assess the capability of the patient to bring in the medication. If it is determined that the patient cannot supply the medication and it is not available to be dispensed from the pharmacy, the provider will be notified.   Nora August [facility]  12/6/2023  1:09 AM

## 2023-12-06 NOTE — PROGRESS NOTES
DVT Prophylaxis Adjustment Policy (DVT Prophylaxis)     This patient is on DVT Prophylaxis medication that requires a dose adjustment      Date Body Weight IBW  Adjusted BW SCr  CrCl Dialysis status   12/6/2023 90.9 kg (200 lb 6.4 oz) Ideal body weight: 50.1 kg (110 lb 7.2 oz)  Adjusted ideal body weight: 66.4 kg (146 lb 6.9 oz) Serum creatinine: 2 mg/dL (H) 12/06/23 0404  Estimated creatinine clearance: 28 mL/min (A) N/a       Pharmacy has dose-adjusted the DVT Prophylaxis regimen to match   the recommendations from the following table        Ordered Medication:Lovenox 40mg daily    Order Changed/converted to: Lovenox 30mg daily      These changes were made per protocol according to the [facility] Pharmacist   Review for Appropriate Use and Automatic Dose Adjustments of   Subcutaneous Anticoagulants Policy     *Please note this dose may need readjusted if patient's condition changes. Please contact pharmacy with any questions regarding these changes.     Carlos Epps [facility]  12/6/2023  6:26 AM

## 2023-12-06 NOTE — PROGRESS NOTES
4 Eyes Skin Assessment     NAME:  Shan Renee  YOB: 1955  MEDICAL RECORD NUMBER:  [id number]    The patient is being assessed for  Admission    I agree that at least one RN has performed a thorough Head to Toe Skin Assessment on the patient. ALL assessment sites listed below have been assessed. Areas assessed by both nurses:    Head, Face, Ears, Shoulders, Back, Chest, Arms, Elbows, Hands, Sacrum. Buttock, Coccyx, Ischium, Legs. Feet and Heels, and Under Medical Devices         Does the Patient have a Wound?  No noted wound(s)       Jadon Prevention initiated by RN: No  Wound Care Orders initiated by RN: No    Pressure Injury (Stage 3,4, Unstageable, DTI, NWPT, and Complex wounds) if present, place Wound referral order by RN under : No    New Ostomies, if present place, Ostomy referral order under : No     Nurse 1 eSignature: Electronically signed by Jigar Beltrán on 12/6/23 at 1:48 AM EST    **SHARE this note so that the co-signing nurse can place an eSignature**    Nurse 2 eSignature: Electronically signed by Marty Sage RN on 12/6/23 at 6:38 AM EST

## 2023-12-06 NOTE — PROGRESS NOTES
OCCUPATIONAL THERAPY INITIAL EVALUATION    [address]   [address]:                                                  Patient Name: Radha Rollins    MRN: [id number]    : 1955    Room: [address] [place] [address]     Evaluating OT: Carmine Gregorio OTR/L #[id number]    Referring Provider:  Porsche Cherry MD     Specific Provider Orders/Date:  OT Eval and Treat, 23     Diagnosis:   1. Fall, initial encounter    2. Ambulatory dysfunction    3. Acute on chronic renal insufficiency    4. Hyperkalemia    5.  Acute cystitis without hematuria        Surgery: None      Pertinent Medical History: diabetic neuropathy, chronic back pain, HTN, DM, asthma, OA, MI, galucoma, COPD, mvmt disorder, UI, CVA, left TKA       Precautions:  Fall Risk, falls and alarm      Assessment of current deficits    [x] Functional mobility  [x]ADLs  [x] Strength               []Cognition    [x] Functional transfers   [x] IADLs         [x] Safety Awareness   [x]Endurance    [] Fine Coordination              [x] Balance      [] Vision/perception   []Sensation     []Gross Motor Coordination  [] ROM  [] Delirium                   [] Motor Control     OT PLAN OF CARE   OT POC based on physician orders, patient diagnosis and results of clinical assessment    Frequency/Duration 1-3 days/wk for 2 weeks PRN     Specific OT Treatment Interventions to include:   * Instruction/training on adapted ADL techniques and AE recommendations to increase functional independence within precautions       * Training on energy conservation strategies, correct breathing pattern and techniques to improve independence/tolerance for self-care routine  * Functional transfer/mobility training/DME recommendations for increased independence, safety, and fall prevention  * Patient/Family education to increase follow through with safety techniques and functional independence  * placement to improve safety.     Independent    Functional Mobility Stand by Assist with wheeled walker to improve balance to/from bathroom, verbal cues for walker sequence and safety.     Modified Breathitt with use of wheeled walker    Balance Sitting:     Static: fair plus     Dynamic: fair plus   Standing: fair with walker     Sitting:     Static: good    Dynamic: good  Standing: good with walker    Activity Tolerance fair  / fair plus   Increase standing tolerance >3  minutes for improved engagement with functional transfers and indep in ADLs     Visual/  Perceptual Glasses: Yes     Reports changes in vision since admission: No      NA      Hand Dominance:      AROM (PROM) Strength Additional Info:  Goal:   RUE  WFL 4-/5 good  and fair FMC/dexterity noted during ADL tasks   Improve overall RUE strength  for participation in functional tasks   LUE WFL 4-/5 good  and fair FMC/dexterity noted during ADL tasks   Improve overall LUE strength  for participation in functional tasks     Hearing:  WFL   Sensation: Numbess B hands/fingers  Tone:  WFL   Edema:     Comments: RN cleared patient for OT.   Upon arrival patient in supine. Therapist facilitated and instructed pt on adapted  techniques & compensatory strategies to improve safety and independence with basic ADLs, bed mobility, functional transfers and mobility  to allow pt to achieve highest level of independence and safely.  Pt demonstrated fair/fair plus understanding of education & follow through.  At end of session, patient was in supine, alarm on, with call light and phone within reach, all lines and tubes intact.  Overall, patient demonstrated  decreased independence and safety during completion of ADL tasks.  Pt would benefit from continued skilled OT to increase safety and independence with completion of ADL tasks and functional mobility for improved quality of life.       Rehab Potential: Good for established goals.      Patient / Family Goal:

## 2023-12-06 NOTE — CARE COORDINATION
12/6/2023. Social Work Discharge Planning: [facility]. This worker met with Pt to discuss  role and transition of care/discharge planning. Pt and her grandson reside together. Awaiting therapy evals. IV ATB. Pt receives an aide m-f from 9-5:30pm via [facility]. Pt states she needs a ww. SW made a referral to [facility] DME-order is needed. Pt also has a trilogy. Pt is on room air however states she uses home o2 as needed HS only. Pharmacy is [facility] on [facility] and PCP is Dr. Tarango He or aide will transport at discharge. Electronically signed by SINDHU Hoffmann on 12/6/2023 at 10:34 AM    12/6/2023  Social Work Discharge Planning:SW discussed [facility] of 17/24. Pt will return home and would like [facility]. Referral was made to [facility]. Order is needed.  Electronically signed by SINDHU Hoffmann on 12/6/2023 at 11:44 AM

## 2023-12-06 NOTE — CONSULTS
[address] [facility]   Inpatient CHF Nurse Navigator Consult      Cardiologist: Dr Elida Robles is a 75 y.o. (1955) female with a history of HFrEF, most recent EF:  Lab Results   Component Value Date    LVEF 40 04/16/2022       Patient was awake and alert, laying in bed during the consultation and is agreeable to heart failure education. She was engaged and asked appropriate questions throughout the education session. She has not been tracking her weights at home and admits to only weighing herself a few times a week. We discussed the importance of daily weights. We also discussed calling the clinic if she notices increase in swelling or shortness of breath. Barriers identified during consult contributing to HF Hospitalization:  [] Limited medication adherence   [] Poor health literacy, education regarding HF medications provided   [] Pill box provided to patient  [] Difficulty affording medications  [] Prescription assistance information given     [] Not weighing themselves daily  [x] Weight log provided for easy monitoring  [] Scale provided     [] Not following low sodium diet  [] Food insecurity   [x] 2 gram sodium diet education provided   [] Low sodium recipes provided  [] Sodium free seasoning provided   [] Low sodium meal delivery options given to patient  [] Dietician consulted     [] Lack of transportation to appointments     [] Depression, given chronic illness  [] Primary team notified     [] Goals of care need addressed  [] Palliative care consulted     [] CHF CHW consulted, to assist with     Chart Reviewed:  Diet: ADULT DIET; Regular; 4 carb choices (60 gm/meal);  Low Sodium (2 gm)   Daily Weights: Patient Vitals for the past 96 hrs (Last 3 readings):   Weight   12/06/23 0425 90.9 kg (200 lb 6.4 oz)   12/06/23 0130 88.4 kg (194 lb 14.2 oz)     I/O: No intake or output data in the 24 hours ending 12/06/23 1012    [] Nursing staff/manager notified of

## 2023-12-06 NOTE — PROGRESS NOTES
Attention Dr. Suzette Grayson  The potassium replacement protocol is not recommended for patients with a CrCl below 30. The patients clearance is currently 28 ml/min. These will be discontinued. Orders for potassium can be entered as needed.   Thank you,  Glenna Person [facility],  12/6/2023 6:46 AM

## 2023-12-06 NOTE — PROGRESS NOTES
[facility] Progress Note    Admitting Date and Time: 12/5/2023  4:59 PM  Admit Dx: Hyperkalemia [E87.5]  Acute on chronic renal insufficiency [N28.9, N18.9]  ALEC (acute kidney injury) ([address]) [N17.9]  Acute cystitis without hematuria [N30.00]  Fall, initial encounter [W19. XXXA]  Ambulatory dysfunction [R26.2]    Subjective:  Patient is being followed for Hyperkalemia [E87.5]  Acute on chronic renal insufficiency [N28.9, N18.9]  ALEC (acute kidney injury) ([address]) [N17.9]  Acute cystitis without hematuria [N30.00]  Fall, initial encounter [W19. XXXA]  Ambulatory dysfunction [R26.2]   Pt seen and examined this morning  No acute events overnight  Lying in bed, complaining of generalized pain and tenderness    ROS: denies fever, chills, cp, sob, n/v, HA unless stated above.       aspirin  81 mg Oral Daily    baclofen  10 mg Oral BID    cetirizine  10 mg Oral Daily    docusate sodium  200 mg Oral Nightly    DULoxetine  60 mg Oral Daily    famotidine  40 mg Oral Daily    fluticasone  2 spray Each Nostril Daily    gabapentin  100 mg Oral BID    montelukast  10 mg Oral Nightly    oxybutynin  10 mg Oral Daily    traZODone  100 mg Oral Nightly    sodium chloride flush  5-40 mL IntraVENous 2 times per day    simvastatin  20 mg Oral Nightly    budesonide  0.5 mg Nebulization BID RT    And    arformoterol tartrate  15 mcg Nebulization BID RT    enoxaparin  30 mg SubCUTAneous Daily    calcium gluconate  1,000 mg IntraVENous Once    cefTRIAXone (ROCEPHIN) IV  1,000 mg IntraVENous Q24H    insulin lispro  0-4 Units SubCUTAneous TID WC    insulin lispro  0-4 Units SubCUTAneous Nightly     sodium chloride flush, 5-40 mL, PRN  sodium chloride, , PRN  ondansetron, 4 mg, Q8H PRN   Or  ondansetron, 4 mg, Q6H PRN  polyethylene glycol, 17 g, Daily PRN  acetaminophen, 650 mg, Q6H PRN   Or  acetaminophen, 650 mg, Q6H PRN  dextrose bolus, 125 mL, PRN   Or  dextrose bolus, 250 mL, PRN  glucagon (rDNA), 1 mg, PRN  dextrose, ,

## 2023-12-06 NOTE — ACP (ADVANCE CARE PLANNING)
Advance Care Planning   Healthcare Decision Maker:    Primary Decision Maker: Alex Perea - Child - [phone]    Secondary Decision Maker: Sonya Flower - Brother/Sister - [phone]    Omaira Bentley.

## 2023-12-07 ENCOUNTER — TELEPHONE (OUTPATIENT)
Dept: CARDIOLOGY CLINIC | Age: 68
End: 2023-12-07

## 2023-12-07 ENCOUNTER — APPOINTMENT (OUTPATIENT)
Dept: MRI IMAGING | Age: 68
DRG: 682 | End: 2023-12-07
Payer: MEDICARE

## 2023-12-07 ENCOUNTER — APPOINTMENT (OUTPATIENT)
Dept: CT IMAGING | Age: 68
DRG: 682 | End: 2023-12-07
Payer: MEDICARE

## 2023-12-07 LAB
ANION GAP SERPL CALCULATED.3IONS-SCNC: 10 MMOL/L (ref 7–16)
BASOPHILS # BLD: 0.02 K/UL (ref 0–0.2)
BASOPHILS NFR BLD: 0 % (ref 0–2)
BUN SERPL-MCNC: 44 MG/DL (ref 6–23)
CALCIUM SERPL-MCNC: 8.8 MG/DL (ref 8.6–10.2)
CHLORIDE SERPL-SCNC: 101 MMOL/L (ref 98–107)
CO2 SERPL-SCNC: 21 MMOL/L (ref 22–29)
CREAT SERPL-MCNC: 2 MG/DL (ref 0.5–1)
EOSINOPHIL # BLD: 0.16 K/UL (ref 0.05–0.5)
EOSINOPHILS RELATIVE PERCENT: 3 % (ref 0–6)
ERYTHROCYTE [DISTWIDTH] IN BLOOD BY AUTOMATED COUNT: 12.4 % (ref 11.5–15)
GFR SERPL CREATININE-BSD FRML MDRD: 27 ML/MIN/1.73M2
GLUCOSE BLD-MCNC: 130 MG/DL (ref 74–99)
GLUCOSE BLD-MCNC: 160 MG/DL (ref 74–99)
GLUCOSE BLD-MCNC: 174 MG/DL (ref 74–99)
GLUCOSE BLD-MCNC: 174 MG/DL (ref 74–99)
GLUCOSE SERPL-MCNC: 180 MG/DL (ref 74–99)
HCT VFR BLD AUTO: 33.3 % (ref 34–48)
HGB BLD-MCNC: 10.6 G/DL (ref 11.5–15.5)
LYMPHOCYTES NFR BLD: 0.33 K/UL (ref 1.5–4)
LYMPHOCYTES RELATIVE PERCENT: 7 % (ref 20–42)
MCH RBC QN AUTO: 31.5 PG (ref 26–35)
MCHC RBC AUTO-ENTMCNC: 31.8 G/DL (ref 32–34.5)
MCV RBC AUTO: 99.1 FL (ref 80–99.9)
MONOCYTES NFR BLD: 0.35 K/UL (ref 0.1–0.95)
MONOCYTES NFR BLD: 7 % (ref 2–12)
NEUTROPHILS NFR BLD: 82 % (ref 43–80)
NEUTS SEG NFR BLD: 3.95 K/UL (ref 1.8–7.3)
PLATELET # BLD AUTO: 184 K/UL (ref 130–450)
PMV BLD AUTO: 11 FL (ref 7–12)
POTASSIUM SERPL-SCNC: 4.4 MMOL/L (ref 3.5–5)
RBC # BLD AUTO: 3.36 M/UL (ref 3.5–5.5)
RBC # BLD: ABNORMAL 10*6/UL
RBC # BLD: ABNORMAL 10*6/UL
SODIUM SERPL-SCNC: 132 MMOL/L (ref 132–146)
WBC OTHER # BLD: 4.8 K/UL (ref 4.5–11.5)

## 2023-12-07 PROCEDURE — 6370000000 HC RX 637 (ALT 250 FOR IP): Performed by: STUDENT IN AN ORGANIZED HEALTH CARE EDUCATION/TRAINING PROGRAM

## 2023-12-07 PROCEDURE — 6360000002 HC RX W HCPCS: Performed by: STUDENT IN AN ORGANIZED HEALTH CARE EDUCATION/TRAINING PROGRAM

## 2023-12-07 PROCEDURE — 6370000000 HC RX 637 (ALT 250 FOR IP): Performed by: INTERNAL MEDICINE

## 2023-12-07 PROCEDURE — 1200000000 HC SEMI PRIVATE

## 2023-12-07 PROCEDURE — 70450 CT HEAD/BRAIN W/O DYE: CPT

## 2023-12-07 PROCEDURE — 94660 CPAP INITIATION&MGMT: CPT

## 2023-12-07 PROCEDURE — 99222 1ST HOSP IP/OBS MODERATE 55: CPT | Performed by: PSYCHIATRY & NEUROLOGY

## 2023-12-07 PROCEDURE — 80048 BASIC METABOLIC PNL TOTAL CA: CPT

## 2023-12-07 PROCEDURE — 82962 GLUCOSE BLOOD TEST: CPT

## 2023-12-07 PROCEDURE — 6360000002 HC RX W HCPCS: Performed by: INTERNAL MEDICINE

## 2023-12-07 PROCEDURE — 70544 MR ANGIOGRAPHY HEAD W/O DYE: CPT

## 2023-12-07 PROCEDURE — 2700000000 HC OXYGEN THERAPY PER DAY

## 2023-12-07 PROCEDURE — 85025 COMPLETE CBC W/AUTO DIFF WBC: CPT

## 2023-12-07 PROCEDURE — 70551 MRI BRAIN STEM W/O DYE: CPT

## 2023-12-07 PROCEDURE — 36415 COLL VENOUS BLD VENIPUNCTURE: CPT

## 2023-12-07 PROCEDURE — 2580000003 HC RX 258: Performed by: STUDENT IN AN ORGANIZED HEALTH CARE EDUCATION/TRAINING PROGRAM

## 2023-12-07 PROCEDURE — 94640 AIRWAY INHALATION TREATMENT: CPT

## 2023-12-07 RX ORDER — LABETALOL HYDROCHLORIDE 5 MG/ML
20 INJECTION, SOLUTION INTRAVENOUS ONCE
Status: COMPLETED | OUTPATIENT
Start: 2023-12-07 | End: 2023-12-07

## 2023-12-07 RX ORDER — LEVETIRACETAM 500 MG/5ML
1000 INJECTION, SOLUTION, CONCENTRATE INTRAVENOUS ONCE
Status: COMPLETED | OUTPATIENT
Start: 2023-12-07 | End: 2023-12-07

## 2023-12-07 RX ORDER — LABETALOL HYDROCHLORIDE 5 MG/ML
10 INJECTION, SOLUTION INTRAVENOUS EVERY 4 HOURS PRN
Status: DISCONTINUED | OUTPATIENT
Start: 2023-12-07 | End: 2023-12-08

## 2023-12-07 RX ORDER — ORPHENADRINE CITRATE 100 MG/1
100 TABLET, EXTENDED RELEASE ORAL 2 TIMES DAILY PRN
Status: DISCONTINUED | OUTPATIENT
Start: 2023-12-07 | End: 2023-12-07

## 2023-12-07 RX ADMIN — OXYBUTYNIN CHLORIDE 10 MG: 10 TABLET, EXTENDED RELEASE ORAL at 08:28

## 2023-12-07 RX ADMIN — FLUTICASONE PROPIONATE 2 SPRAY: 50 SPRAY, METERED NASAL at 08:29

## 2023-12-07 RX ADMIN — DULOXETINE HYDROCHLORIDE 60 MG: 60 CAPSULE, DELAYED RELEASE ORAL at 08:28

## 2023-12-07 RX ADMIN — ARFORMOTEROL TARTRATE 15 MCG: 15 SOLUTION RESPIRATORY (INHALATION) at 08:51

## 2023-12-07 RX ADMIN — LABETALOL HYDROCHLORIDE 10 MG: 5 INJECTION, SOLUTION INTRAVENOUS at 11:04

## 2023-12-07 RX ADMIN — CETIRIZINE HYDROCHLORIDE 10 MG: 10 TABLET, FILM COATED ORAL at 08:28

## 2023-12-07 RX ADMIN — ARFORMOTEROL TARTRATE 15 MCG: 15 SOLUTION RESPIRATORY (INHALATION) at 21:00

## 2023-12-07 RX ADMIN — LABETALOL HYDROCHLORIDE 10 MG: 5 INJECTION, SOLUTION INTRAVENOUS at 18:38

## 2023-12-07 RX ADMIN — LEVETIRACETAM 1000 MG: 100 INJECTION, SOLUTION INTRAVENOUS at 16:31

## 2023-12-07 RX ADMIN — ENOXAPARIN SODIUM 30 MG: 100 INJECTION SUBCUTANEOUS at 08:29

## 2023-12-07 RX ADMIN — GABAPENTIN 100 MG: 100 CAPSULE ORAL at 08:28

## 2023-12-07 RX ADMIN — BUDESONIDE INHALATION 500 MCG: 0.5 SUSPENSION RESPIRATORY (INHALATION) at 21:00

## 2023-12-07 RX ADMIN — WATER 1000 MG: 1 INJECTION INTRAMUSCULAR; INTRAVENOUS; SUBCUTANEOUS at 19:44

## 2023-12-07 RX ADMIN — ASPIRIN 81 MG CHEWABLE TABLET 81 MG: 81 TABLET CHEWABLE at 08:28

## 2023-12-07 RX ADMIN — BACLOFEN 10 MG: 10 TABLET ORAL at 08:28

## 2023-12-07 RX ADMIN — LABETALOL HYDROCHLORIDE 20 MG: 5 INJECTION, SOLUTION INTRAVENOUS at 16:32

## 2023-12-07 RX ADMIN — BUDESONIDE INHALATION 500 MCG: 0.5 SUSPENSION RESPIRATORY (INHALATION) at 08:51

## 2023-12-07 RX ADMIN — ACETAMINOPHEN 650 MG: 650 SUPPOSITORY RECTAL at 18:37

## 2023-12-07 RX ADMIN — FAMOTIDINE 40 MG: 20 TABLET ORAL at 08:28

## 2023-12-07 ASSESSMENT — PAIN SCALES - GENERAL: PAINLEVEL_OUTOF10: 0

## 2023-12-07 NOTE — PROGRESS NOTES
Date: 12/6/2023    Time: 8:55 PM    Patient Placed On BIPAP/CPAP/ Non-Invasive Ventilation? Yes    If no must comment. Facial area red/color change? No           If YES are Blister/Lesion present? No   If yes must notify nursing staff  BIPAP/CPAP skin barrier?   Yes    Skin barrier type:mepilexlite          12/06/23 2052   NIV Type   NIV Started/Stopped On   Equipment Type V60   Mode Biphasic   Mask Type Full face mask   Mask Size Small   Assessment   Respirations 22   Comfort Level Good   Using Accessory Muscles No   Mask Compliance Good   Skin Assessment Clean, dry, & intact   Skin Protection for O2 Device Yes   Orientation Middle   Location Nose   Breath Sounds   Right Upper Lobe Clear   Settings/Measurements   PIP Observed 23 cm H20   IPAP 22 cmH20   CPAP/EPAP 18 cmH2O   Vt (Measured) 532 mL   FiO2  40 %   Minute Volume (L/min) 9.5 Liters   Mask Leak (lpm) 40 lpm   Patient's Home Machine No   Alarm Settings   Alarms On Y   Low Pressure (cmH2O) 6 cmH2O   High Pressure (cmH2O) 40 cmH2O   Apnea (secs) 20 secs   RR Low (bpm) 12   RR High (bpm) 40 br/min   Patient Observation   Observations red elvin Barrientos RCP

## 2023-12-07 NOTE — CARE COORDINATION
12/7/2023  Social Work Discharge Planning:Pt will return home and would like [address]. Referral was made to [facility]. Order is in. Pt receives an aide m-f from 9-5:30pm via [employer]. Pt also has a trilogy. Pt is on room air however states she uses home o2 as needed HS only. Grandson or aide will transport at discharge.  Electronically signed by SINDHU Catalan on 12/7/2023 at 8:31 AM

## 2023-12-07 NOTE — VIRTUAL HEALTH
[facility] Stroke and Telestroke Consult for  [facility] Inpatient Stroke Alert through [facility] @ 11:56  12/7/2023 2:06 PM    Pt Name: Steph Gonzáles  MRN: [id number]  YOB: 1955  Date of evaluation: 12/7/2023  Primary Care Physician: Vince Beatty APRN - CNP  Reason for Evaluation: Stroke Evaluation with Discussion with Ed or primary team with Telemedicine and stroke evaluation with Review of imaging and labs    Steph Gonzáles is a 67 y.o. female who presents with extensive medical history including. PAD, CAD, prior cerebellar stroke, t2dm, fatty liver, morbid obesity with admission for generalized weakness and after fall.  Workup for possible uti with treatment and this am noted to be encephalopathic with expressive aphasia.  One word answers.  Nih 10 on initial assessment. Ongoing electrolyte correction and creatinine 2 with acute renal failure    Last well likely more than 4.5 hrs.  Head ct no hemorrhage. Would benefit from mri brain and mra head to ensure no stroke and risk factors.     Cont aspirin    LKW: 11:00 possible yesterday  NIH:  9    Allergies  is allergic to latex, bee venom, dilaudid [hydromorphone hcl], dye [iodides], keflex [cephalexin], acetaminophen, bee pollen, lasix [furosemide], levaquin [levofloxacin in d5w], lipitor, lyrica [pregabalin], morphine, naproxen, nefazodone, norvasc [amlodipine], oxycodone-acetaminophen, shellfish-derived products, and trazodone and nefazodone.  Medications  Prior to Admission medications    Medication Sig Start Date End Date Taking? Authorizing Provider   fluticasone (FLONASE) 50 MCG/ACT nasal spray 2 sprays by Nasal route daily 2 sprays each nostril once daily  Patient not taking: Reported on 12/6/2023 10/26/23   Jose Diaz,    Azelastine HCl 137 MCG/SPRAY SOLN USE 2 SPRAYS IN EACH NOSTRIL IN THE MORNING AND AT BEDTIME AS DIRECTED  Patient not taking: Reported on 12/6/2023 10/23/23    necessary medical care. Time spent examining patient, reviewing the images personally, reviewing the chart, perform high complexity decision making and speaking with the nursing staff regarding recommendations    Otto Richardson MD, MD   Stroke, Neurocritical Care And/or [address] [facility] Stroke [facility]  Electronically signed 12/7/2023 at 2:06 PM      aKthy Mccarthy, was evaluated through a synchronous (real-time) audio-video encounter. The patient (and/or guardian if applicable) is aware that this is a billable service, which includes applicable co-pays. This virtual visit was conducted with patient's (and/or legal guardian's) consent. Patient identification was verified, and a caregiver was present when appropriate. The patient was located at [facility] ([address] [place] [address]): [facility] 5SB MED SURG/TELE  [address]: [phone]  The provider was located at [facility] Dept): [facility] NEUROSCIENCE  [address]  [phone]       Total time spent on this encounter:  400 Rasta Venegas MD on 12/7/2023 at 2:06 PM    An electronic signature was used to authenticate this note.

## 2023-12-07 NOTE — PROGRESS NOTES
[facility] Hospitalist Progress Note    Admitting Date and Time: 12/5/2023  4:59 PM  Admit Dx: Hyperkalemia [E87.5]  Acute on chronic renal insufficiency [N28.9, N18.9]  ALEC (acute kidney injury) (HCC) [N17.9]  Acute cystitis without hematuria [N30.00]  Fall, initial encounter [W19.XXXA]  Ambulatory dysfunction [R26.2]    Subjective:  Patient is being followed for Hyperkalemia [E87.5]  Acute on chronic renal insufficiency [N28.9, N18.9]  ALEC (acute kidney injury) (HCC) [N17.9]  Acute cystitis without hematuria [N30.00]  Fall, initial encounter [W19.XXXA]  Ambulatory dysfunction [R26.2]   Pt seen and examined this morning  No acute events overnight  Patient seems altered today.  Dysarthric.  Berley answering any questions.  She occasionally will tell me her last name or her daughter's name but does not say anything else.  Follows commands by squeezing hands and blinking and smiling but nothing else.  Seems like she is trying to talk and son answered questions but unable to.  Gets frustrated at times.    Telestroke contacted, CT head as well as MRI and MRA without contrast obtained.  Negative for acute stroke.    Daughter at bedside and updated at all times    ROS: Unable to assess     aspirin  81 mg Oral Daily    [Held by provider] baclofen  10 mg Oral BID    cetirizine  10 mg Oral Daily    docusate sodium  200 mg Oral Nightly    [Held by provider] DULoxetine  60 mg Oral Daily    famotidine  40 mg Oral Daily    fluticasone  2 spray Each Nostril Daily    [Held by provider] gabapentin  100 mg Oral BID    montelukast  10 mg Oral Nightly    oxybutynin  10 mg Oral Daily    [Held by provider] traZODone  100 mg Oral Nightly    sodium chloride flush  5-40 mL IntraVENous 2 times per day    simvastatin  20 mg Oral Nightly    budesonide  0.5 mg Nebulization BID RT    And    arformoterol tartrate  15 mcg Nebulization BID RT    enoxaparin  30 mg SubCUTAneous Daily    calcium gluconate  1,000 mg IntraVENous Once      161 184   MPV 11.3 10.9 11.0       Assessment and Plan:     Principal Problem:    ALEC (acute kidney injury) ([address])  Active Problems:    Fall    Acute cystitis without hematuria    Acute on chronic renal insufficiency    Hyperkalemia  Resolved Problems:    * No resolved hospital problems. *    AMS. CTH, MRI brain, MRA brain unremarkable. Possible metabolic vs subclinical seizures with postictal state. Will give Keppra 1 g x 1. Monitor symptoms overnight.  EEG. Generalized weakness and pain, 2/2 #2. PT OT and pain control  Mechanical fall. PT OT  ALEC on CKD stage IIIb. Continue IVF. Avoid nephrotoxic medications. Continue to monitor kidney function  Hyperkalemia. Treated with calcium gluconate, insulin, and Lokelma. Repeat 4.4  Urinary tract infection. Continue IV Rocephin. Follow urine cultures  Hyponatremia resolved  DM2 with hyperglycemia. Continue sliding scale. ACHS. Hypoglycemia protocol  Chronic normocytic anemia. Hemoglobin stable. No concern for active bleeding at this time. Monitor H&H daily  Chronic hypoxic respiratory failure. On 4 L at baseline. Currently at baseline. Saturating well  History of CAD, COPD, CVA, HTN. All stable. Continue medications    Time spent reviewing chart, clinical exam, discussing case and answering questions with staff/consultants/patient/family aprox 60 mins. NOTE: This report was transcribed using voice recognition software. Every effort was made to ensure accuracy; however, inadvertent computerized transcription errors may be present.   Electronically signed by Alexi Jonas MD on 12/7/2023 at 6:45 PM

## 2023-12-07 NOTE — PROGRESS NOTES
DR Domingo Handmelissa updated of patient have involuntary movements noted by family and charge nurse lasting a few seconds at a time.

## 2023-12-07 NOTE — TELEPHONE ENCOUNTER
Patient scheduled for extractions under IV sedation at [facility] by oral surgeon. Patient is okay to have procedure done while on Aspirin. 1)  Does patient need antibiotic prophylaxis? 2)  Contraindications to IV sedation? 3)  Any need to withhold or alter any medications prior to treatment? Please advise.

## 2023-12-07 NOTE — PROGRESS NOTES
Updated Dr Darby Hennessy that staff attempted to get EEG completed this evening, but unable to reach staff at [facility] ([facility]).

## 2023-12-07 NOTE — PROGRESS NOTES
Date: 12/6/2023    Time: 10:46 PM    Patient Placed On BIPAP/CPAP/ Non-Invasive Ventilation? Pt remains on    If no must comment. Facial area red/color change? No           If YES are Blister/Lesion present? No   If yes must notify nursing staff  BIPAP/CPAP skin barrier? No    Skin barrier type: not on        Comments:Machine plugged into red outlet and outside alarm plugged in.         Efren Walker RCP    12/06/23 3415   Assessment   Comfort Level Good   Using Accessory Muscles No   Mask Compliance Good   Skin Assessment Clean, dry, & intact   Skin Protection for O2 Device Yes   Settings/Measurements   PIP Observed 21 cm H20   IPAP 22 cmH20   CPAP/EPAP 18 cmH2O   Vt (Measured) 355 mL   FiO2  40 %   Minute Volume (L/min) 6.8 Liters   Mask Leak (lpm) 25 lpm

## 2023-12-07 NOTE — PROGRESS NOTES
Occupational Therapy    Attempted OT treatment, hold treatment per nursing, patient currently off floor for MRI. OT to re-attempt at a later date/time as able and appropriate.      Maryanne Mari OTR/SERINA  License Number: [id number]

## 2023-12-07 NOTE — TELEPHONE ENCOUNTER
Lindsay at [facility] notified of Dr. Boateng's risk assessment/recommendations. [facility] fax is down so unable to fax note.

## 2023-12-07 NOTE — TELEPHONE ENCOUNTER
Patient's revised cardiac risk index is elevated (2), class III risk, 6.6% risk of major perioperative cardiac events. Currently, patient denies any active cardiac symptoms. She can continue aspirin for dental procedure with low risk of bleeding. No need for antibiotic prophylaxis. Continue labetalol perioperatively. Patient is stable from the cardiology standpoint for dental surgery.

## 2023-12-08 ENCOUNTER — APPOINTMENT (OUTPATIENT)
Dept: NEUROLOGY | Age: 68
DRG: 682 | End: 2023-12-08
Payer: MEDICARE

## 2023-12-08 LAB
ANION GAP SERPL CALCULATED.3IONS-SCNC: 14 MMOL/L (ref 7–16)
BASOPHILS # BLD: 0.01 K/UL (ref 0–0.2)
BASOPHILS NFR BLD: 0 % (ref 0–2)
BUN SERPL-MCNC: 26 MG/DL (ref 6–23)
CALCIUM SERPL-MCNC: 9.4 MG/DL (ref 8.6–10.2)
CHLORIDE SERPL-SCNC: 106 MMOL/L (ref 98–107)
CO2 SERPL-SCNC: 22 MMOL/L (ref 22–29)
CREAT SERPL-MCNC: 1.2 MG/DL (ref 0.5–1)
EOSINOPHIL # BLD: 0.05 K/UL (ref 0.05–0.5)
EOSINOPHILS RELATIVE PERCENT: 1 % (ref 0–6)
ERYTHROCYTE [DISTWIDTH] IN BLOOD BY AUTOMATED COUNT: 12.7 % (ref 11.5–15)
GFR SERPL CREATININE-BSD FRML MDRD: 49 ML/MIN/1.73M2
GLUCOSE BLD-MCNC: 128 MG/DL (ref 74–99)
GLUCOSE BLD-MCNC: 133 MG/DL (ref 74–99)
GLUCOSE BLD-MCNC: 138 MG/DL (ref 74–99)
GLUCOSE BLD-MCNC: 140 MG/DL (ref 74–99)
GLUCOSE BLD-MCNC: 140 MG/DL (ref 74–99)
GLUCOSE BLD-MCNC: 158 MG/DL (ref 74–99)
GLUCOSE SERPL-MCNC: 144 MG/DL (ref 74–99)
HCT VFR BLD AUTO: 36.3 % (ref 34–48)
HGB BLD-MCNC: 11.6 G/DL (ref 11.5–15.5)
IMM GRANULOCYTES # BLD AUTO: 0.04 K/UL (ref 0–0.58)
IMM GRANULOCYTES NFR BLD: 1 % (ref 0–5)
LYMPHOCYTES NFR BLD: 0.39 K/UL (ref 1.5–4)
LYMPHOCYTES RELATIVE PERCENT: 7 % (ref 20–42)
MCH RBC QN AUTO: 32 PG (ref 26–35)
MCHC RBC AUTO-ENTMCNC: 32 G/DL (ref 32–34.5)
MCV RBC AUTO: 100 FL (ref 80–99.9)
MICROORGANISM SPEC CULT: ABNORMAL
MONOCYTES NFR BLD: 0.36 K/UL (ref 0.1–0.95)
MONOCYTES NFR BLD: 7 % (ref 2–12)
NEUTROPHILS NFR BLD: 84 % (ref 43–80)
NEUTS SEG NFR BLD: 4.46 K/UL (ref 1.8–7.3)
PLATELET # BLD AUTO: 244 K/UL (ref 130–450)
PMV BLD AUTO: 10.2 FL (ref 7–12)
POTASSIUM SERPL-SCNC: 4.6 MMOL/L (ref 3.5–5)
RBC # BLD AUTO: 3.63 M/UL (ref 3.5–5.5)
RBC # BLD: ABNORMAL 10*6/UL
SODIUM SERPL-SCNC: 142 MMOL/L (ref 132–146)
SPECIMEN DESCRIPTION: ABNORMAL
WBC OTHER # BLD: 5.3 K/UL (ref 4.5–11.5)

## 2023-12-08 PROCEDURE — 80048 BASIC METABOLIC PNL TOTAL CA: CPT

## 2023-12-08 PROCEDURE — 95816 EEG AWAKE AND DROWSY: CPT

## 2023-12-08 PROCEDURE — 99233 SBSQ HOSP IP/OBS HIGH 50: CPT | Performed by: INTERNAL MEDICINE

## 2023-12-08 PROCEDURE — 6370000000 HC RX 637 (ALT 250 FOR IP): Performed by: INTERNAL MEDICINE

## 2023-12-08 PROCEDURE — 6360000002 HC RX W HCPCS: Performed by: INTERNAL MEDICINE

## 2023-12-08 PROCEDURE — 85025 COMPLETE CBC W/AUTO DIFF WBC: CPT

## 2023-12-08 PROCEDURE — 82962 GLUCOSE BLOOD TEST: CPT

## 2023-12-08 PROCEDURE — 1200000000 HC SEMI PRIVATE

## 2023-12-08 PROCEDURE — 6360000002 HC RX W HCPCS: Performed by: STUDENT IN AN ORGANIZED HEALTH CARE EDUCATION/TRAINING PROGRAM

## 2023-12-08 PROCEDURE — 6360000002 HC RX W HCPCS: Performed by: NURSE PRACTITIONER

## 2023-12-08 PROCEDURE — 2580000003 HC RX 258: Performed by: STUDENT IN AN ORGANIZED HEALTH CARE EDUCATION/TRAINING PROGRAM

## 2023-12-08 PROCEDURE — 94640 AIRWAY INHALATION TREATMENT: CPT

## 2023-12-08 PROCEDURE — 2500000003 HC RX 250 WO HCPCS: Performed by: INTERNAL MEDICINE

## 2023-12-08 PROCEDURE — 94660 CPAP INITIATION&MGMT: CPT

## 2023-12-08 RX ORDER — MORPHINE SULFATE 2 MG/ML
1 INJECTION, SOLUTION INTRAMUSCULAR; INTRAVENOUS ONCE
Status: DISCONTINUED | OUTPATIENT
Start: 2023-12-08 | End: 2023-12-08

## 2023-12-08 RX ORDER — LABETALOL HYDROCHLORIDE 5 MG/ML
20 INJECTION, SOLUTION INTRAVENOUS EVERY 4 HOURS
Status: DISCONTINUED | OUTPATIENT
Start: 2023-12-08 | End: 2023-12-09

## 2023-12-08 RX ORDER — LORAZEPAM 2 MG/ML
0.5 INJECTION INTRAMUSCULAR ONCE
Status: COMPLETED | OUTPATIENT
Start: 2023-12-08 | End: 2023-12-08

## 2023-12-08 RX ORDER — DIPHENHYDRAMINE HYDROCHLORIDE 50 MG/ML
10 INJECTION INTRAMUSCULAR; INTRAVENOUS ONCE
Status: COMPLETED | OUTPATIENT
Start: 2023-12-08 | End: 2023-12-08

## 2023-12-08 RX ORDER — MORPHINE SULFATE 2 MG/ML
1 INJECTION, SOLUTION INTRAMUSCULAR; INTRAVENOUS ONCE
Status: COMPLETED | OUTPATIENT
Start: 2023-12-08 | End: 2023-12-08

## 2023-12-08 RX ORDER — LEVETIRACETAM 500 MG/5ML
1000 INJECTION, SOLUTION, CONCENTRATE INTRAVENOUS ONCE
Status: COMPLETED | OUTPATIENT
Start: 2023-12-08 | End: 2023-12-08

## 2023-12-08 RX ORDER — KETOROLAC TROMETHAMINE 30 MG/ML
30 INJECTION, SOLUTION INTRAMUSCULAR; INTRAVENOUS
Status: COMPLETED | OUTPATIENT
Start: 2023-12-08 | End: 2023-12-08

## 2023-12-08 RX ORDER — ENALAPRILAT 1.25 MG/ML
1.25 INJECTION INTRAVENOUS EVERY 6 HOURS SCHEDULED
Status: DISCONTINUED | OUTPATIENT
Start: 2023-12-08 | End: 2023-12-09

## 2023-12-08 RX ORDER — DIPHENHYDRAMINE HYDROCHLORIDE 50 MG/ML
25 INJECTION INTRAMUSCULAR; INTRAVENOUS ONCE
Status: COMPLETED | OUTPATIENT
Start: 2023-12-08 | End: 2023-12-08

## 2023-12-08 RX ORDER — HYDRALAZINE HYDROCHLORIDE 20 MG/ML
10 INJECTION INTRAMUSCULAR; INTRAVENOUS EVERY 4 HOURS PRN
Status: DISCONTINUED | OUTPATIENT
Start: 2023-12-08 | End: 2023-12-10 | Stop reason: HOSPADM

## 2023-12-08 RX ORDER — LABETALOL HYDROCHLORIDE 5 MG/ML
20 INJECTION, SOLUTION INTRAVENOUS EVERY 4 HOURS PRN
Status: DISCONTINUED | OUTPATIENT
Start: 2023-12-08 | End: 2023-12-08

## 2023-12-08 RX ORDER — MORPHINE SULFATE 2 MG/ML
2 INJECTION, SOLUTION INTRAMUSCULAR; INTRAVENOUS ONCE
Status: COMPLETED | OUTPATIENT
Start: 2023-12-08 | End: 2023-12-08

## 2023-12-08 RX ADMIN — WATER 1000 MG: 1 INJECTION INTRAMUSCULAR; INTRAVENOUS; SUBCUTANEOUS at 20:53

## 2023-12-08 RX ADMIN — ENOXAPARIN SODIUM 30 MG: 100 INJECTION SUBCUTANEOUS at 10:31

## 2023-12-08 RX ADMIN — MICONAZOLE NITRATE: 20 POWDER TOPICAL at 21:06

## 2023-12-08 RX ADMIN — LABETALOL HYDROCHLORIDE 20 MG: 5 INJECTION, SOLUTION INTRAVENOUS at 15:43

## 2023-12-08 RX ADMIN — DIPHENHYDRAMINE HYDROCHLORIDE 10 MG: 50 INJECTION INTRAMUSCULAR; INTRAVENOUS at 10:21

## 2023-12-08 RX ADMIN — LABETALOL HYDROCHLORIDE 20 MG: 5 INJECTION, SOLUTION INTRAVENOUS at 21:04

## 2023-12-08 RX ADMIN — MORPHINE SULFATE 1 MG: 2 INJECTION, SOLUTION INTRAMUSCULAR; INTRAVENOUS at 13:02

## 2023-12-08 RX ADMIN — LEVETIRACETAM 1000 MG: 100 INJECTION, SOLUTION INTRAVENOUS at 03:48

## 2023-12-08 RX ADMIN — LORAZEPAM 0.5 MG: 2 INJECTION INTRAMUSCULAR; INTRAVENOUS at 17:57

## 2023-12-08 RX ADMIN — LABETALOL HYDROCHLORIDE 20 MG: 5 INJECTION, SOLUTION INTRAVENOUS at 13:02

## 2023-12-08 RX ADMIN — BUDESONIDE INHALATION 500 MCG: 0.5 SUSPENSION RESPIRATORY (INHALATION) at 17:39

## 2023-12-08 RX ADMIN — ENALAPRILAT 1.25 MG: 1.25 INJECTION INTRAVENOUS at 17:57

## 2023-12-08 RX ADMIN — LABETALOL HYDROCHLORIDE 10 MG: 5 INJECTION, SOLUTION INTRAVENOUS at 00:23

## 2023-12-08 RX ADMIN — MORPHINE SULFATE 2 MG: 2 INJECTION, SOLUTION INTRAMUSCULAR; INTRAVENOUS at 20:51

## 2023-12-08 RX ADMIN — MORPHINE SULFATE 1 MG: 2 INJECTION, SOLUTION INTRAMUSCULAR; INTRAVENOUS at 10:21

## 2023-12-08 RX ADMIN — DIPHENHYDRAMINE HYDROCHLORIDE 25 MG: 50 INJECTION INTRAMUSCULAR; INTRAVENOUS at 23:02

## 2023-12-08 RX ADMIN — MICONAZOLE NITRATE: 20 POWDER TOPICAL at 13:02

## 2023-12-08 RX ADMIN — KETOROLAC TROMETHAMINE 30 MG: 30 INJECTION, SOLUTION INTRAMUSCULAR; INTRAVENOUS at 15:39

## 2023-12-08 RX ADMIN — ARFORMOTEROL TARTRATE 15 MCG: 15 SOLUTION RESPIRATORY (INHALATION) at 17:40

## 2023-12-08 ASSESSMENT — PAIN SCALES - WONG BAKER: WONGBAKER_NUMERICALRESPONSE: 6

## 2023-12-08 NOTE — PROGRESS NOTES
Date:2023  Patient Name: Kristin Ramos  MRN: [id number]  : 1955  ROOM #: [id number]    Occupational Therapy treatment attempted this AM. Patient is not appropriate for OT treatment at this time, per RN . Will re-attempt OT at a later time. Thank you.      Pavithra Meyer OTR/L #[id number]

## 2023-12-08 NOTE — PROGRESS NOTES
Patients family requesting something for comfort. Reached out to Dr Concetta Rizzo, he will be up to see pt. Call to EEG office for update on time. Message left by unit secretary.

## 2023-12-08 NOTE — PROGRESS NOTES
New blister noted on pt R breast, wound care consulted. Wound care RN notified via [employer].

## 2023-12-08 NOTE — PLAN OF CARE
Problem: Discharge Planning  Goal: Discharge to home or other facility with appropriate resources  12/8/2023 1331 by Syed Sales RN  Outcome: Progressing  12/8/2023 1331 by Syed Sales RN  Outcome: Progressing     Problem: Safety - Adult  Goal: Free from fall injury  12/8/2023 1331 by Syed Sales RN  Outcome: Progressing  12/8/2023 1331 by Syed Sales RN  Outcome: Progressing

## 2023-12-08 NOTE — PROGRESS NOTES
Spoke with [facility] , she will confirm with tech and will return call regarding time for testing.

## 2023-12-08 NOTE — PLAN OF CARE
Problem: Discharge Planning  Goal: Discharge to home or other facility with appropriate resources  Outcome: Progressing  Flowsheets (Taken 12/7/2023 1930)  Discharge to home or other facility with appropriate resources: Identify barriers to discharge with patient and caregiver     Problem: Chronic Conditions and Co-morbidities  Goal: Patient's chronic conditions and co-morbidity symptoms are monitored and maintained or improved  Recent Flowsheet Documentation  Taken 12/7/2023 1930 by Rex Mckeon [address] - Patient's Chronic Conditions and Co-Morbidity Symptoms are Monitored and Maintained or Improved: Monitor and assess patient's chronic conditions and comorbid symptoms for stability, deterioration, or improvement

## 2023-12-08 NOTE — CARE COORDINATION
12/8/2023  Social Work Discharge Planning: SW met with Pt and family. Discharge plan to be determined pending Pts progress. Plan was to return home with [facility]. Order is in. Pt receives an aide m-f from 9-5:30pm via [facility] [facility]. Pt also has a trilogy. Pt is on room air however states she uses home o2 as needed HS only. Grandson or aide will transport at discharge.  Electronically signed by Thane Cabot, LSW on 12/8/2023 at 9:29 AM

## 2023-12-08 NOTE — PROCEDURES
EEG Report  Buffy Collazo is a 75 y.o. female      Appointment Date 12/8/2023   Appointment Time  1:30pm   Facility Location [facility] EEG Number    Type of Study portable Floor 538-A     Technical Specifications  Technician [address] of consciousness Awake/yelling/moaning    Sleep deprived? no   Hyperventilation tested? no   Photic stim tested? no   EEG recording Standard 10-20 electrode placement    Duration of recording 23 mins   EEG complete? yes       Clinical History   Buffy Collazo is a 75 y.o. female who presents with extensive medical history including. PAD, CAD, prior cerebellar stroke, t2dm, fatty liver, morbid obesity with admission for generalized weakness and after fall. Workup for possible uti with treatment and this am noted to be encephalopathic with expressive aphasia. One word answers. Nih 10 on initial assessment. Ongoing electrolyte correction and creatinine 2 with acute renal failure. Last well likely more than 4.5 hrs. Head ct no hemorrhage. Would benefit from mri brain and mra head to ensure no stroke and risk factors.      Medications    Current Facility-Administered Medications:     hydrALAZINE (APRESOLINE) injection 10 mg, 10 mg, IntraVENous, Q4H PRN, Nahomi Cortez MD    miconazole (MICOTIN) 2 % powder, , Topical, BID, Nahomi Cortez MD, Given at 12/08/23 1302    labetalol (NORMODYNE;TRANDATE) injection 20 mg, 20 mg, IntraVENous, Q4H, Santana Davis MD, 20 mg at 12/08/23 1302    ketorolac (TORADOL) injection 30 mg, 30 mg, IntraVENous, Once PRN, Nahomi Cortez MD    aspirin chewable tablet 81 mg, 81 mg, Oral, Daily, Cary Lassiter MD, 81 mg at 12/07/23 6286    [Held by provider] baclofen (LIORESAL) tablet 10 mg, 10 mg, Oral, BID, Cary Lassiter MD, 10 mg at 12/07/23 0828    cetirizine (ZYRTEC) tablet 10 mg, 10 mg, Oral, Daily, Cary Lassiter MD, 10 mg at 12/07/23 0828    docusate sodium (COLACE) capsule 200 mg, 200 mg, Oral, mcg, 0.5 mg, Nebulization, BID RT, 500 mcg at 12/07/23 2100 **AND** arformoterol tartrate (BROVANA) nebulizer solution 15 mcg, 15 mcg, Nebulization, BID RT, Prem Lassiter MD, 15 mcg at 12/07/23 2100    enoxaparin Sodium (LOVENOX) injection 30 mg, 30 mg, SubCUTAneous, Daily, Prem Lassiter MD, 30 mg at 12/08/23 1031    calcium gluconate 1,000 mg in sodium chloride 50 mL, 1,000 mg, IntraVENous, Once, Santana Davis MD, Last Rate: 600 mL/hr at 12/06/23 1038, Restarted at 12/06/23 1038    lidocaine 4 % external patch 1 patch, 1 patch, TransDERmal, Daily, Santana Davis MD, 1 patch at 12/08/23 1022    cefTRIAXone (ROCEPHIN) 1,000 mg in sterile water 10 mL IV syringe, 1,000 mg, IntraVENous, Q24H, Prem Lassiter MD, 1,000 mg at 12/07/23 1944    0.9 % sodium chloride infusion, , IntraVENous, Continuous, Santana Davis MD, Last Rate: 100 mL/hr at 12/07/23 1636, Rate Change at 12/07/23 1636    dextrose bolus 10% 125 mL, 125 mL, IntraVENous, PRN **OR** dextrose bolus 10% 250 mL, 250 mL, IntraVENous, PRN, Prem Lassiter MD    glucagon injection 1 mg, 1 mg, SubCUTAneous, PRN, Prem Lassiter MD    dextrose 10 % infusion, , IntraVENous, Continuous PRN, Prem Lassiter MD    insulin lispro (HUMALOG) injection vial 0-4 Units, 0-4 Units, SubCUTAneous, TID WC, Prem Lassiter MD    insulin lispro (HUMALOG) injection vial 0-4 Units, 0-4 Units, SubCUTAneous, Nightly, Prem Lassiter MD    Glucose (TRUEPLUS) oral gel 15 g, 15 g, Oral, PRN, Prem Lassiter MD        Physician Interpretation    General EEG Report  EEG study was performed using the 10-20 electrode placement system in patient who was awake and responsive at time of study.  No abnormal behavior or movements noted during the study.  Activation procedures included photic stimulation and hyperventilation.    Type of EEG:   Routine Inpatient EEG with video    Description   Background:

## 2023-12-08 NOTE — FLOWSHEET NOTE
Inpatient Wound Care (Initial consult) 538    Admit Date: 12/5/2023  4:59 PM    Reason for consult:  right breast    Significant history:  per H&P       CHIEF COMPLAINT: Fall, generalized weakness     Past medical history significant: CAD, chronic systolic CHF, COPD, chronic hypoxic respiratory failure requiring supplemental oxygen via nasal cannula (baseline 4 L) DM type II, hypertension, hyperlipidemia, history of CVA, DAYAN, tobacco abuse, obesity class I who presents with generalized weakness and a fall. Findings:     12/08/23 1055   Wound 12/08/23 Breast Lateral;Right   Date First Assessed/Time First Assessed: 12/08/23 1158   Present on Original Admission: No  Location: Breast  Wound Location Orientation: Lateral;Right   Wound Image    Wound Etiology   (intact blister)   Dressing Status New dressing applied   Wound Cleansed Cleansed with saline   Dressing/Treatment Foam  (Opticell)   Wound Length (cm) 5.2 cm   Wound Width (cm) 1.8 cm   Wound Depth (cm)   (closed blister)   Wound Surface Area (cm^2) 9.36 cm^2   Wound Assessment   (serous fluid filled blister)   Drainage Amount None (dry)   Kely-wound Assessment Intact      Abdominal fold red rash with excoriation  Left arm bruise  Bilateral buttocks intact, blanching  Both heels intact, blanching    **Informed Consent**    The patient has given verbal consent to have photos taken of wounds and inserted into their chart as part of their permanent medical record for purposes of documentation, treatment management and/or medical review. All Images taken on 12/8/23 of patient name: Debbi Linn were transmitted and stored on secured [employer] located within [facility] by a registered [employer] Mobile Application Device.      Impression:  see above description    Plan: Opticell/foam to right breast  Antifungal powder to abdominal folds  Comfort glide  Patient will need continued preventative care      Isabella Stpehenson RN 12/8/2023 12:01 PM

## 2023-12-08 NOTE — PROGRESS NOTES
Patient more alert and following commands and talking with staff  short answers and smiling at staff daughter at bedside

## 2023-12-09 LAB
ALBUMIN SERPL-MCNC: 3.1 G/DL (ref 3.5–5.2)
ALP SERPL-CCNC: 190 U/L (ref 35–104)
ALT SERPL-CCNC: 24 U/L (ref 0–32)
AMMONIA PLAS-SCNC: 19 UMOL/L (ref 11–51)
ANION GAP SERPL CALCULATED.3IONS-SCNC: 14 MMOL/L (ref 7–16)
ANION GAP SERPL CALCULATED.3IONS-SCNC: 15 MMOL/L (ref 7–16)
AST SERPL-CCNC: 13 U/L (ref 0–31)
B.E.: -1.2 MMOL/L (ref -3–3)
BACTERIA URNS QL MICRO: ABNORMAL
BASOPHILS # BLD: 0.02 K/UL (ref 0–0.2)
BASOPHILS NFR BLD: 0 % (ref 0–2)
BILIRUB SERPL-MCNC: 0.7 MG/DL (ref 0–1.2)
BILIRUB UR QL STRIP: NEGATIVE
BNP SERPL-MCNC: ABNORMAL PG/ML (ref 0–125)
BUN SERPL-MCNC: 22 MG/DL (ref 6–23)
BUN SERPL-MCNC: 23 MG/DL (ref 6–23)
CALCIUM SERPL-MCNC: 8.7 MG/DL (ref 8.6–10.2)
CALCIUM SERPL-MCNC: 8.8 MG/DL (ref 8.6–10.2)
CHLORIDE SERPL-SCNC: 105 MMOL/L (ref 98–107)
CHLORIDE SERPL-SCNC: 109 MMOL/L (ref 98–107)
CLARITY UR: CLEAR
CO2 SERPL-SCNC: 20 MMOL/L (ref 22–29)
CO2 SERPL-SCNC: 20 MMOL/L (ref 22–29)
COHB: 1.6 % (ref 0–1.5)
COLOR UR: YELLOW
CREAT SERPL-MCNC: 1.1 MG/DL (ref 0.5–1)
CREAT SERPL-MCNC: 1.1 MG/DL (ref 0.5–1)
CRITICAL: ABNORMAL
DATE ANALYZED: ABNORMAL
DATE OF COLLECTION: ABNORMAL
EOSINOPHIL # BLD: 0.12 K/UL (ref 0.05–0.5)
EOSINOPHILS RELATIVE PERCENT: 2 % (ref 0–6)
ERYTHROCYTE [DISTWIDTH] IN BLOOD BY AUTOMATED COUNT: 12.9 % (ref 11.5–15)
GFR SERPL CREATININE-BSD FRML MDRD: 56 ML/MIN/1.73M2
GFR SERPL CREATININE-BSD FRML MDRD: 57 ML/MIN/1.73M2
GLUCOSE BLD-MCNC: 146 MG/DL (ref 74–99)
GLUCOSE BLD-MCNC: 151 MG/DL (ref 74–99)
GLUCOSE BLD-MCNC: 162 MG/DL (ref 74–99)
GLUCOSE BLD-MCNC: 165 MG/DL (ref 74–99)
GLUCOSE SERPL-MCNC: 165 MG/DL (ref 74–99)
GLUCOSE SERPL-MCNC: 168 MG/DL (ref 74–99)
GLUCOSE UR STRIP-MCNC: NEGATIVE MG/DL
HCO3: 22.9 MMOL/L (ref 22–26)
HCT VFR BLD AUTO: 31.6 % (ref 34–48)
HGB BLD-MCNC: 10.3 G/DL (ref 11.5–15.5)
HGB UR QL STRIP.AUTO: ABNORMAL
HHB: 5.8 % (ref 0–5)
IMM GRANULOCYTES # BLD AUTO: 0.05 K/UL (ref 0–0.58)
IMM GRANULOCYTES NFR BLD: 1 % (ref 0–5)
KETONES UR STRIP-MCNC: 15 MG/DL
LAB: ABNORMAL
LACTATE BLDV-SCNC: 1.1 MMOL/L (ref 0.5–2.2)
LEUKOCYTE ESTERASE UR QL STRIP: NEGATIVE
LYMPHOCYTES NFR BLD: 0.54 K/UL (ref 1.5–4)
LYMPHOCYTES RELATIVE PERCENT: 9 % (ref 20–42)
Lab: 1805
MAGNESIUM SERPL-MCNC: 1.6 MG/DL (ref 1.6–2.6)
MCH RBC QN AUTO: 32.2 PG (ref 26–35)
MCHC RBC AUTO-ENTMCNC: 32.6 G/DL (ref 32–34.5)
MCV RBC AUTO: 98.8 FL (ref 80–99.9)
METHB: 0.3 % (ref 0–1.5)
MODE: ABNORMAL
MONOCYTES NFR BLD: 0.44 K/UL (ref 0.1–0.95)
MONOCYTES NFR BLD: 7 % (ref 2–12)
NEUTROPHILS NFR BLD: 80 % (ref 43–80)
NEUTS SEG NFR BLD: 4.78 K/UL (ref 1.8–7.3)
NITRITE UR QL STRIP: NEGATIVE
O2 CONTENT: 14.3 ML/DL
O2 SATURATION: 94.1 % (ref 92–98.5)
O2HB: 92.3 % (ref 94–97)
OPERATOR ID: 5323
PATIENT TEMP: 37 C
PCO2: 36.1 MMHG (ref 35–45)
PH BLOOD GAS: 7.42 (ref 7.35–7.45)
PH UR STRIP: 6 [PH] (ref 5–9)
PHOSPHATE SERPL-MCNC: 1.9 MG/DL (ref 2.5–4.5)
PLATELET # BLD AUTO: 234 K/UL (ref 130–450)
PMV BLD AUTO: 10 FL (ref 7–12)
PO2: 72.1 MMHG (ref 75–100)
POTASSIUM SERPL-SCNC: 4.1 MMOL/L (ref 3.5–5)
POTASSIUM SERPL-SCNC: 4.3 MMOL/L (ref 3.5–5)
PROT SERPL-MCNC: 6.4 G/DL (ref 6.4–8.3)
PROT UR STRIP-MCNC: 30 MG/DL
RBC # BLD AUTO: 3.2 M/UL (ref 3.5–5.5)
RBC # BLD: ABNORMAL 10*6/UL
RBC #/AREA URNS HPF: ABNORMAL /HPF
SODIUM SERPL-SCNC: 139 MMOL/L (ref 132–146)
SODIUM SERPL-SCNC: 144 MMOL/L (ref 132–146)
SOURCE, BLOOD GAS: ABNORMAL
SP GR UR STRIP: 1.02 (ref 1–1.03)
THB: 11 G/DL (ref 11.5–16.5)
TIME ANALYZED: 1810
TROPONIN I SERPL HS-MCNC: 45 NG/L (ref 0–9)
TSH SERPL DL<=0.05 MIU/L-ACNC: 1.13 UIU/ML (ref 0.27–4.2)
UROBILINOGEN UR STRIP-ACNC: 1 EU/DL (ref 0–1)
WBC #/AREA URNS HPF: ABNORMAL /HPF
WBC OTHER # BLD: 6 K/UL (ref 4.5–11.5)

## 2023-12-09 PROCEDURE — 80053 COMPREHEN METABOLIC PANEL: CPT

## 2023-12-09 PROCEDURE — 82962 GLUCOSE BLOOD TEST: CPT

## 2023-12-09 PROCEDURE — 2580000003 HC RX 258

## 2023-12-09 PROCEDURE — 83880 ASSAY OF NATRIURETIC PEPTIDE: CPT

## 2023-12-09 PROCEDURE — 82140 ASSAY OF AMMONIA: CPT

## 2023-12-09 PROCEDURE — 2500000003 HC RX 250 WO HCPCS: Performed by: INTERNAL MEDICINE

## 2023-12-09 PROCEDURE — 87081 CULTURE SCREEN ONLY: CPT

## 2023-12-09 PROCEDURE — 80048 BASIC METABOLIC PNL TOTAL CA: CPT

## 2023-12-09 PROCEDURE — 94660 CPAP INITIATION&MGMT: CPT

## 2023-12-09 PROCEDURE — 81001 URINALYSIS AUTO W/SCOPE: CPT

## 2023-12-09 PROCEDURE — 2580000003 HC RX 258: Performed by: STUDENT IN AN ORGANIZED HEALTH CARE EDUCATION/TRAINING PROGRAM

## 2023-12-09 PROCEDURE — 85025 COMPLETE CBC W/AUTO DIFF WBC: CPT

## 2023-12-09 PROCEDURE — 6360000002 HC RX W HCPCS

## 2023-12-09 PROCEDURE — 36600 WITHDRAWAL OF ARTERIAL BLOOD: CPT

## 2023-12-09 PROCEDURE — 6360000002 HC RX W HCPCS: Performed by: INTERNAL MEDICINE

## 2023-12-09 PROCEDURE — 83605 ASSAY OF LACTIC ACID: CPT

## 2023-12-09 PROCEDURE — 82805 BLOOD GASES W/O2 SATURATION: CPT

## 2023-12-09 PROCEDURE — 36415 COLL VENOUS BLD VENIPUNCTURE: CPT

## 2023-12-09 PROCEDURE — 99233 SBSQ HOSP IP/OBS HIGH 50: CPT | Performed by: INTERNAL MEDICINE

## 2023-12-09 PROCEDURE — 99291 CRITICAL CARE FIRST HOUR: CPT

## 2023-12-09 PROCEDURE — 6360000002 HC RX W HCPCS: Performed by: NURSE PRACTITIONER

## 2023-12-09 PROCEDURE — 84100 ASSAY OF PHOSPHORUS: CPT

## 2023-12-09 PROCEDURE — 82728 ASSAY OF FERRITIN: CPT

## 2023-12-09 PROCEDURE — 6360000002 HC RX W HCPCS: Performed by: STUDENT IN AN ORGANIZED HEALTH CARE EDUCATION/TRAINING PROGRAM

## 2023-12-09 PROCEDURE — 94640 AIRWAY INHALATION TREATMENT: CPT

## 2023-12-09 PROCEDURE — 2500000003 HC RX 250 WO HCPCS

## 2023-12-09 PROCEDURE — 84443 ASSAY THYROID STIM HORMONE: CPT

## 2023-12-09 PROCEDURE — 82607 VITAMIN B-12: CPT

## 2023-12-09 PROCEDURE — 83735 ASSAY OF MAGNESIUM: CPT

## 2023-12-09 PROCEDURE — 84484 ASSAY OF TROPONIN QUANT: CPT

## 2023-12-09 PROCEDURE — 87086 URINE CULTURE/COLONY COUNT: CPT

## 2023-12-09 PROCEDURE — 2000000000 HC ICU R&B

## 2023-12-09 PROCEDURE — 83550 IRON BINDING TEST: CPT

## 2023-12-09 PROCEDURE — 83540 ASSAY OF IRON: CPT

## 2023-12-09 PROCEDURE — 82746 ASSAY OF FOLIC ACID SERUM: CPT

## 2023-12-09 PROCEDURE — 6370000000 HC RX 637 (ALT 250 FOR IP): Performed by: INTERNAL MEDICINE

## 2023-12-09 PROCEDURE — 2700000000 HC OXYGEN THERAPY PER DAY

## 2023-12-09 PROCEDURE — 2580000003 HC RX 258: Performed by: INTERNAL MEDICINE

## 2023-12-09 PROCEDURE — 51702 INSERT TEMP BLADDER CATH: CPT

## 2023-12-09 RX ORDER — HYDROXYZINE HYDROCHLORIDE 50 MG/ML
25 INJECTION, SOLUTION INTRAMUSCULAR ONCE
Status: COMPLETED | OUTPATIENT
Start: 2023-12-09 | End: 2023-12-09

## 2023-12-09 RX ORDER — DIPHENHYDRAMINE HYDROCHLORIDE 50 MG/ML
25 INJECTION INTRAMUSCULAR; INTRAVENOUS EVERY 6 HOURS PRN
Status: DISCONTINUED | OUTPATIENT
Start: 2023-12-09 | End: 2023-12-10 | Stop reason: HOSPADM

## 2023-12-09 RX ORDER — LORAZEPAM 2 MG/ML
1 INJECTION INTRAMUSCULAR EVERY 6 HOURS PRN
Status: DISCONTINUED | OUTPATIENT
Start: 2023-12-09 | End: 2023-12-10 | Stop reason: HOSPADM

## 2023-12-09 RX ORDER — ENALAPRILAT 1.25 MG/ML
2.5 INJECTION INTRAVENOUS EVERY 6 HOURS SCHEDULED
Status: DISCONTINUED | OUTPATIENT
Start: 2023-12-09 | End: 2023-12-09

## 2023-12-09 RX ORDER — MAGNESIUM SULFATE IN WATER 40 MG/ML
2000 INJECTION, SOLUTION INTRAVENOUS ONCE
Status: COMPLETED | OUTPATIENT
Start: 2023-12-09 | End: 2023-12-10

## 2023-12-09 RX ORDER — MIDAZOLAM HYDROCHLORIDE 2 MG/2ML
5 INJECTION, SOLUTION INTRAMUSCULAR; INTRAVENOUS ONCE
Status: COMPLETED | OUTPATIENT
Start: 2023-12-09 | End: 2023-12-09

## 2023-12-09 RX ORDER — LEVETIRACETAM 500 MG/5ML
750 INJECTION, SOLUTION, CONCENTRATE INTRAVENOUS EVERY 12 HOURS
Status: DISCONTINUED | OUTPATIENT
Start: 2023-12-10 | End: 2023-12-10 | Stop reason: HOSPADM

## 2023-12-09 RX ORDER — HYDRALAZINE HYDROCHLORIDE 20 MG/ML
20 INJECTION INTRAMUSCULAR; INTRAVENOUS ONCE
Status: COMPLETED | OUTPATIENT
Start: 2023-12-09 | End: 2023-12-09

## 2023-12-09 RX ORDER — LEVETIRACETAM 500 MG/5ML
1500 INJECTION, SOLUTION, CONCENTRATE INTRAVENOUS ONCE
Status: COMPLETED | OUTPATIENT
Start: 2023-12-09 | End: 2023-12-09

## 2023-12-09 RX ADMIN — BUDESONIDE INHALATION 500 MCG: 0.5 SUSPENSION RESPIRATORY (INHALATION) at 21:47

## 2023-12-09 RX ADMIN — LABETALOL HYDROCHLORIDE 20 MG: 5 INJECTION, SOLUTION INTRAVENOUS at 21:17

## 2023-12-09 RX ADMIN — BUDESONIDE INHALATION 500 MCG: 0.5 SUSPENSION RESPIRATORY (INHALATION) at 09:26

## 2023-12-09 RX ADMIN — LABETALOL HYDROCHLORIDE 20 MG: 5 INJECTION, SOLUTION INTRAVENOUS at 09:44

## 2023-12-09 RX ADMIN — LABETALOL HYDROCHLORIDE 20 MG: 5 INJECTION, SOLUTION INTRAVENOUS at 16:32

## 2023-12-09 RX ADMIN — ARFORMOTEROL TARTRATE 15 MCG: 15 SOLUTION RESPIRATORY (INHALATION) at 21:47

## 2023-12-09 RX ADMIN — MICONAZOLE NITRATE: 20 POWDER TOPICAL at 11:29

## 2023-12-09 RX ADMIN — SODIUM CHLORIDE, PRESERVATIVE FREE 10 ML: 5 INJECTION INTRAVENOUS at 21:18

## 2023-12-09 RX ADMIN — SODIUM CHLORIDE 5 MG/HR: 9 INJECTION, SOLUTION INTRAVENOUS at 22:08

## 2023-12-09 RX ADMIN — HYDRALAZINE HYDROCHLORIDE 20 MG: 20 INJECTION, SOLUTION INTRAMUSCULAR; INTRAVENOUS at 21:16

## 2023-12-09 RX ADMIN — LABETALOL HYDROCHLORIDE 20 MG: 5 INJECTION, SOLUTION INTRAVENOUS at 05:06

## 2023-12-09 RX ADMIN — ENALAPRILAT 2.5 MG: 1.25 INJECTION INTRAVENOUS at 18:03

## 2023-12-09 RX ADMIN — SODIUM CHLORIDE: 9 INJECTION, SOLUTION INTRAVENOUS at 00:46

## 2023-12-09 RX ADMIN — ENALAPRILAT 2.5 MG: 1.25 INJECTION INTRAVENOUS at 12:02

## 2023-12-09 RX ADMIN — MIDAZOLAM 5 MG: 1 INJECTION INTRAMUSCULAR; INTRAVENOUS at 12:16

## 2023-12-09 RX ADMIN — WATER 1000 MG: 1 INJECTION INTRAMUSCULAR; INTRAVENOUS; SUBCUTANEOUS at 23:03

## 2023-12-09 RX ADMIN — HYDROXYZINE HYDROCHLORIDE 25 MG: 50 INJECTION, SOLUTION INTRAMUSCULAR at 00:38

## 2023-12-09 RX ADMIN — FLUTICASONE PROPIONATE 2 SPRAY: 50 SPRAY, METERED NASAL at 09:46

## 2023-12-09 RX ADMIN — LEVETIRACETAM 1500 MG: 100 INJECTION, SOLUTION INTRAVENOUS at 12:16

## 2023-12-09 RX ADMIN — LABETALOL HYDROCHLORIDE 20 MG: 5 INJECTION, SOLUTION INTRAVENOUS at 00:38

## 2023-12-09 RX ADMIN — LORAZEPAM 1 MG: 2 INJECTION INTRAMUSCULAR; INTRAVENOUS at 23:03

## 2023-12-09 RX ADMIN — LABETALOL HYDROCHLORIDE 20 MG: 5 INJECTION, SOLUTION INTRAVENOUS at 12:01

## 2023-12-09 RX ADMIN — ENOXAPARIN SODIUM 30 MG: 100 INJECTION SUBCUTANEOUS at 09:45

## 2023-12-09 RX ADMIN — ARFORMOTEROL TARTRATE 15 MCG: 15 SOLUTION RESPIRATORY (INHALATION) at 09:26

## 2023-12-09 RX ADMIN — DIPHENHYDRAMINE HYDROCHLORIDE 25 MG: 50 INJECTION INTRAMUSCULAR; INTRAVENOUS at 22:58

## 2023-12-09 RX ADMIN — ENALAPRILAT 1.25 MG: 1.25 INJECTION INTRAVENOUS at 05:06

## 2023-12-09 RX ADMIN — SODIUM CHLORIDE, PRESERVATIVE FREE 10 ML: 5 INJECTION INTRAVENOUS at 12:22

## 2023-12-09 RX ADMIN — ENALAPRILAT 1.25 MG: 1.25 INJECTION INTRAVENOUS at 00:38

## 2023-12-09 ASSESSMENT — PAIN SCALES - WONG BAKER: WONGBAKER_NUMERICALRESPONSE: 2

## 2023-12-09 ASSESSMENT — PAIN DESCRIPTION - LOCATION: LOCATION: OTHER (COMMENT)

## 2023-12-09 ASSESSMENT — PAIN SCALES - GENERAL: PAINLEVEL_OUTOF10: 0

## 2023-12-09 NOTE — PROGRESS NOTES
12/09/23 0103   NIV Type   NIV Started/Stopped (S)  Off  (pt found to have no bipap in room. family said someone took it out. pt is currently agitated, family requesting to not place on at this time.  also requesting not to have in room because it is in the way.)

## 2023-12-09 NOTE — PROGRESS NOTES
[facility] Progress Note    Admitting Date and Time: 12/5/2023  4:59 PM  Admit Dx: Hyperkalemia [E87.5]  Acute on chronic renal insufficiency [N28.9, N18.9]  ALEC (acute kidney injury) ([address]) [N17.9]  Acute cystitis without hematuria [N30.00]  Fall, initial encounter [W19. XXXA]  Ambulatory dysfunction [R26.2]    Subjective:  Patient is being followed for Hyperkalemia [E87.5]  Acute on chronic renal insufficiency [N28.9, N18.9]  ALEC (acute kidney injury) ([address]) [N17.9]  Acute cystitis without hematuria [N30.00]  Fall, initial encounter [W19. XXXA]  Ambulatory dysfunction [R26.2]   Pt seen and examined this morning  Per reports, seems like she was agitated overnight  Happy and talkative this morning, inappropriately.    But unable to answer questions or follow commands    ROS: Unable to assess     enalaprilat  2.5 mg IntraVENous 4 times per day    [START ON 12/10/2023] levETIRAcetam  750 mg IntraVENous Q12H    miconazole   Topical BID    labetalol  20 mg IntraVENous Q4H    aspirin  81 mg Oral Daily    [Held by provider] baclofen  10 mg Oral BID    cetirizine  10 mg Oral Daily    docusate sodium  200 mg Oral Nightly    [Held by provider] DULoxetine  60 mg Oral Daily    famotidine  40 mg Oral Daily    fluticasone  2 spray Each Nostril Daily    [Held by provider] gabapentin  100 mg Oral BID    montelukast  10 mg Oral Nightly    oxybutynin  10 mg Oral Daily    [Held by provider] traZODone  100 mg Oral Nightly    sodium chloride flush  5-40 mL IntraVENous 2 times per day    simvastatin  20 mg Oral Nightly    budesonide  0.5 mg Nebulization BID RT    And    arformoterol tartrate  15 mcg Nebulization BID RT    enoxaparin  30 mg SubCUTAneous Daily    calcium gluconate  1,000 mg IntraVENous Once    lidocaine  1 patch TransDERmal Daily    cefTRIAXone (ROCEPHIN) IV  1,000 mg IntraVENous Q24H    insulin lispro  0-4 Units SubCUTAneous TID WC    insulin lispro  0-4 Units SubCUTAneous Nightly     hydrALAZINE, 10 mg, hospital problems. *    AMS. CTH, MRI brain, MRA brain unremarkable. Possible metabolic vs subclinical seizures with postictal state.  Will give Keppra 1 g x 1 12/7.  EEG completed 12/8 awaiting read. ? Due to elevated BP. Pain control.  Per neurology recommendations, will give 5 mg of Versed x 1, Keppra 1500 mg x 1.  Followed by Keppra 750 mg twice daily.  Generalized weakness and pain, 2/2 #3.  PT OT and pain control. Family unaware of morphine allergy as stated in chart. Will trial couple doses   Mechanical fall.  PT OT but unable to do therapy now due to AMS  ALEC on CKD stage IIIb.  Continue IVF.  Avoid nephrotoxic medications.  Continue to monitor kidney function. Resolved   Hyperkalemia. resolved  Urinary tract infection.  Continue IV Rocephin.    Hyponatremia resolved  DM2 with hyperglycemia.  Continue sliding scale.  ACHS.  Hypoglycemia protocol  Chronic normocytic anemia.  Hemoglobin stable.  No concern for active bleeding at this time.  Monitor H&H daily  Chronic hypoxic respiratory failure.  On 4 L at baseline.  Currently at baseline.  Saturating well  History of CAD, COPD, CVA, HTN.  All stable.  Continue medications    Time spent reviewing chart, clinical exam, discussing case and answering questions with staff/consultants/patient/family aprox 50 mins.     NOTE: This report was transcribed using voice recognition software. Every effort was made to ensure accuracy; however, inadvertent computerized transcription errors may be present.  Electronically signed by Santana Davis MD on 12/9/2023 at 5:22 PM

## 2023-12-09 NOTE — PLAN OF CARE
Spoke to neurologist on-call Dr Francisco J Adler to update him on patient's status. Explained case in details since admission and all changes in patient's mental status. Advised that we will go ahead and treat this as seizures. Recommended given 5 mg of IV Versed, 1500 mg of IV Keppra and then scheduled Keppra at 750 mg twice daily. Will follow-up on the patient in the afternoon and update Dr Francisco J Adler.

## 2023-12-09 NOTE — PROGRESS NOTES
Patient unable to tolerate bipap at night due to agitation. Per family request they would like the bipap removed from the room. Bipap currently placed outside patient room.

## 2023-12-09 NOTE — PLAN OF CARE
Problem: Discharge Planning  Goal: Discharge to home or other facility with appropriate resources  12/9/2023 0101 by Adriane Negro RN  Outcome: Progressing  12/8/2023 1331 by Ginny Klinefelter, RN  Outcome: Progressing  12/8/2023 1331 by Ginny Klinefelter, RN  Outcome: Progressing     Problem: Safety - Adult  Goal: Free from fall injury  12/9/2023 0101 by Adriane Negro RN  Outcome: Progressing  12/8/2023 1331 by Ginny Klinefelter, RN  Outcome: Progressing  12/8/2023 1331 by Ginny Klinefelter, RN  Outcome: Progressing     Problem: Chronic Conditions and Co-morbidities  Goal: Patient's chronic conditions and co-morbidity symptoms are monitored and maintained or improved  Outcome: Progressing     Problem: Skin/Tissue Integrity  Goal: Absence of new skin breakdown  Description: 1. Monitor for areas of redness and/or skin breakdown  2. Assess vascular access sites hourly  3. Every 4-6 hours minimum:  Change oxygen saturation probe site  4. Every 4-6 hours:  If on nasal continuous positive airway pressure, respiratory therapy assess nares and determine need for appliance change or resting period.   Outcome: Progressing     Problem: Pain  Goal: Verbalizes/displays adequate comfort level or baseline comfort level  Outcome: Progressing

## 2023-12-09 NOTE — PROGRESS NOTES
Per Dr. Cassie Kumar, he spoke with neuro and the EEG didn't show signs of seizures     Electronically signed by Wm Sanchez RN on 12/9/2023 at 6:30 PM

## 2023-12-10 ENCOUNTER — APPOINTMENT (OUTPATIENT)
Dept: GENERAL RADIOLOGY | Age: 68
DRG: 682 | End: 2023-12-10
Payer: MEDICARE

## 2023-12-10 ENCOUNTER — APPOINTMENT (OUTPATIENT)
Dept: GENERAL RADIOLOGY | Age: 68
DRG: 077 | End: 2023-12-10
Attending: INTERNAL MEDICINE
Payer: MEDICARE

## 2023-12-10 ENCOUNTER — HOSPITAL ENCOUNTER (INPATIENT)
Age: 68
LOS: 4 days | Discharge: HOME | DRG: 077 | End: 2023-12-14
Attending: INTERNAL MEDICINE | Admitting: INTERNAL MEDICINE
Payer: MEDICARE

## 2023-12-10 VITALS
SYSTOLIC BLOOD PRESSURE: 160 MMHG | OXYGEN SATURATION: 96 % | DIASTOLIC BLOOD PRESSURE: 67 MMHG | BODY MASS INDEX: 34.04 KG/M2 | HEIGHT: 62 IN | WEIGHT: 184.97 LBS | TEMPERATURE: 98.7 F | RESPIRATION RATE: 14 BRPM | HEART RATE: 93 BPM

## 2023-12-10 DIAGNOSIS — G93.41 METABOLIC ENCEPHALOPATHY: Primary | ICD-10-CM

## 2023-12-10 LAB
ALBUMIN SERPL-MCNC: 3.3 G/DL (ref 3.5–5.2)
ALP SERPL-CCNC: 200 U/L (ref 35–104)
ALT SERPL-CCNC: 33 U/L (ref 0–32)
ANION GAP SERPL CALCULATED.3IONS-SCNC: 15 MMOL/L (ref 7–16)
AST SERPL-CCNC: 14 U/L (ref 0–31)
BASOPHILS # BLD: 0.02 K/UL (ref 0–0.2)
BASOPHILS NFR BLD: 0 % (ref 0–2)
BILIRUB SERPL-MCNC: 0.6 MG/DL (ref 0–1.2)
BUN SERPL-MCNC: 19 MG/DL (ref 6–23)
CALCIUM SERPL-MCNC: 9 MG/DL (ref 8.6–10.2)
CHLORIDE SERPL-SCNC: 109 MMOL/L (ref 98–107)
CO2 SERPL-SCNC: 20 MMOL/L (ref 22–29)
CREAT SERPL-MCNC: 1 MG/DL (ref 0.5–1)
EOSINOPHIL # BLD: 0.11 K/UL (ref 0.05–0.5)
EOSINOPHILS RELATIVE PERCENT: 1 % (ref 0–6)
ERYTHROCYTE [DISTWIDTH] IN BLOOD BY AUTOMATED COUNT: 13 % (ref 11.5–15)
FERRITIN SERPL-MCNC: 804 NG/ML
FOLATE SERPL-MCNC: 19.4 NG/ML (ref 4.8–24.2)
GFR SERPL CREATININE-BSD FRML MDRD: >60 ML/MIN/1.73M2
GLUCOSE BLD-MCNC: 149 MG/DL (ref 74–99)
GLUCOSE BLD-MCNC: 150 MG/DL (ref 74–99)
GLUCOSE BLD-MCNC: 170 MG/DL (ref 74–99)
GLUCOSE SERPL-MCNC: 209 MG/DL (ref 74–99)
HCT VFR BLD AUTO: 33 % (ref 34–48)
HGB BLD-MCNC: 10.6 G/DL (ref 11.5–15.5)
IMM GRANULOCYTES # BLD AUTO: 0.1 K/UL (ref 0–0.58)
IMM GRANULOCYTES NFR BLD: 1 % (ref 0–5)
IRON SATN MFR SERPL: 16 % (ref 15–50)
IRON SERPL-MCNC: 22 UG/DL (ref 37–145)
LYMPHOCYTES NFR BLD: 0.68 K/UL (ref 1.5–4)
LYMPHOCYTES RELATIVE PERCENT: 8 % (ref 20–42)
MAGNESIUM SERPL-MCNC: 1.5 MG/DL (ref 1.6–2.6)
MAGNESIUM SERPL-MCNC: 3.3 MG/DL (ref 1.6–2.6)
MCH RBC QN AUTO: 31.6 PG (ref 26–35)
MCHC RBC AUTO-ENTMCNC: 32.1 G/DL (ref 32–34.5)
MCV RBC AUTO: 98.5 FL (ref 80–99.9)
MONOCYTES NFR BLD: 0.64 K/UL (ref 0.1–0.95)
MONOCYTES NFR BLD: 8 % (ref 2–12)
NEUTROPHILS NFR BLD: 82 % (ref 43–80)
NEUTS SEG NFR BLD: 6.95 K/UL (ref 1.8–7.3)
PHOSPHATE SERPL-MCNC: 2.6 MG/DL (ref 2.5–4.5)
PLATELET # BLD AUTO: 297 K/UL (ref 130–450)
PMV BLD AUTO: 10.1 FL (ref 7–12)
POTASSIUM SERPL-SCNC: 4.3 MMOL/L (ref 3.5–5)
PROT SERPL-MCNC: 6.8 G/DL (ref 6.4–8.3)
RBC # BLD AUTO: 3.35 M/UL (ref 3.5–5.5)
SODIUM SERPL-SCNC: 144 MMOL/L (ref 132–146)
TIBC SERPL-MCNC: 136 UG/DL (ref 250–450)
VIT B12 SERPL-MCNC: 1016 PG/ML (ref 211–946)
WBC OTHER # BLD: 8.5 K/UL (ref 4.5–11.5)

## 2023-12-10 PROCEDURE — 02HV33Z INSERTION OF INFUSION DEVICE INTO SUPERIOR VENA CAVA, PERCUTANEOUS APPROACH: ICD-10-PCS | Performed by: INTERNAL MEDICINE

## 2023-12-10 PROCEDURE — 6370000000 HC RX 637 (ALT 250 FOR IP)

## 2023-12-10 PROCEDURE — 0202U NFCT DS 22 TRGT SARS-COV-2: CPT

## 2023-12-10 PROCEDURE — 6370000000 HC RX 637 (ALT 250 FOR IP): Performed by: INTERNAL MEDICINE

## 2023-12-10 PROCEDURE — 87040 BLOOD CULTURE FOR BACTERIA: CPT

## 2023-12-10 PROCEDURE — 94660 CPAP INITIATION&MGMT: CPT

## 2023-12-10 PROCEDURE — 83735 ASSAY OF MAGNESIUM: CPT

## 2023-12-10 PROCEDURE — 93005 ELECTROCARDIOGRAM TRACING: CPT

## 2023-12-10 PROCEDURE — 6360000002 HC RX W HCPCS

## 2023-12-10 PROCEDURE — 2580000003 HC RX 258: Performed by: STUDENT IN AN ORGANIZED HEALTH CARE EDUCATION/TRAINING PROGRAM

## 2023-12-10 PROCEDURE — 2580000003 HC RX 258

## 2023-12-10 PROCEDURE — 6360000002 HC RX W HCPCS: Performed by: INTERNAL MEDICINE

## 2023-12-10 PROCEDURE — 99233 SBSQ HOSP IP/OBS HIGH 50: CPT | Performed by: INTERNAL MEDICINE

## 2023-12-10 PROCEDURE — 36556 INSERT NON-TUNNEL CV CATH: CPT

## 2023-12-10 PROCEDURE — A4216 STERILE WATER/SALINE, 10 ML: HCPCS

## 2023-12-10 PROCEDURE — 86789 WEST NILE VIRUS ANTIBODY: CPT

## 2023-12-10 PROCEDURE — 6360000002 HC RX W HCPCS: Performed by: STUDENT IN AN ORGANIZED HEALTH CARE EDUCATION/TRAINING PROGRAM

## 2023-12-10 PROCEDURE — 94640 AIRWAY INHALATION TREATMENT: CPT

## 2023-12-10 PROCEDURE — 37799 UNLISTED PX VASCULAR SURGERY: CPT

## 2023-12-10 PROCEDURE — 2500000003 HC RX 250 WO HCPCS: Performed by: INTERNAL MEDICINE

## 2023-12-10 PROCEDURE — 86618 LYME DISEASE ANTIBODY: CPT

## 2023-12-10 PROCEDURE — 87529 HSV DNA AMP PROBE: CPT

## 2023-12-10 PROCEDURE — 5A09357 ASSISTANCE WITH RESPIRATORY VENTILATION, LESS THAN 24 CONSECUTIVE HOURS, CONTINUOUS POSITIVE AIRWAY PRESSURE: ICD-10-PCS | Performed by: INTERNAL MEDICINE

## 2023-12-10 PROCEDURE — 84100 ASSAY OF PHOSPHORUS: CPT

## 2023-12-10 PROCEDURE — 71045 X-RAY EXAM CHEST 1 VIEW: CPT

## 2023-12-10 PROCEDURE — 80053 COMPREHEN METABOLIC PANEL: CPT

## 2023-12-10 PROCEDURE — C9113 INJ PANTOPRAZOLE SODIUM, VIA: HCPCS

## 2023-12-10 PROCEDURE — 74018 RADEX ABDOMEN 1 VIEW: CPT

## 2023-12-10 PROCEDURE — 2000000000 HC ICU R&B

## 2023-12-10 PROCEDURE — 82962 GLUCOSE BLOOD TEST: CPT

## 2023-12-10 PROCEDURE — 36620 INSERTION CATHETER ARTERY: CPT

## 2023-12-10 PROCEDURE — 85025 COMPLETE CBC W/AUTO DIFF WBC: CPT

## 2023-12-10 PROCEDURE — 36592 COLLECT BLOOD FROM PICC: CPT

## 2023-12-10 PROCEDURE — 2580000003 HC RX 258: Performed by: INTERNAL MEDICINE

## 2023-12-10 PROCEDURE — 2500000003 HC RX 250 WO HCPCS

## 2023-12-10 PROCEDURE — 99239 HOSP IP/OBS DSCHRG MGMT >30: CPT | Performed by: INTERNAL MEDICINE

## 2023-12-10 PROCEDURE — 86788 WEST NILE VIRUS AB IGM: CPT

## 2023-12-10 RX ORDER — BUDESONIDE 0.5 MG/2ML
0.5 INHALANT ORAL
Status: DISCONTINUED | OUTPATIENT
Start: 2023-12-10 | End: 2023-12-14 | Stop reason: HOSPADM

## 2023-12-10 RX ORDER — ARFORMOTEROL TARTRATE 15 UG/2ML
15 SOLUTION RESPIRATORY (INHALATION)
Status: CANCELLED | OUTPATIENT
Start: 2023-12-10 | End: 2025-01-09

## 2023-12-10 RX ORDER — SODIUM CHLORIDE 9 MG/ML
INJECTION, SOLUTION INTRAVENOUS PRN
Status: DISCONTINUED | OUTPATIENT
Start: 2023-12-10 | End: 2023-12-14 | Stop reason: HOSPADM

## 2023-12-10 RX ORDER — HYDRALAZINE HYDROCHLORIDE 20 MG/ML
10 INJECTION INTRAMUSCULAR; INTRAVENOUS EVERY 4 HOURS PRN
Status: DISCONTINUED | OUTPATIENT
Start: 2023-12-10 | End: 2023-12-12

## 2023-12-10 RX ORDER — SODIUM CHLORIDE 9 MG/ML
INJECTION, SOLUTION INTRAVENOUS PRN
Status: DISCONTINUED | OUTPATIENT
Start: 2023-12-10 | End: 2023-12-10 | Stop reason: SDUPTHER

## 2023-12-10 RX ORDER — SODIUM CHLORIDE 9 MG/ML
INJECTION, SOLUTION INTRAVENOUS CONTINUOUS
Status: DISCONTINUED | OUTPATIENT
Start: 2023-12-10 | End: 2023-12-11

## 2023-12-10 RX ORDER — GABAPENTIN 100 MG/1
100 CAPSULE ORAL 2 TIMES DAILY
Status: DISCONTINUED | OUTPATIENT
Start: 2023-12-10 | End: 2023-12-14 | Stop reason: HOSPADM

## 2023-12-10 RX ORDER — SODIUM CHLORIDE 0.9 % (FLUSH) 0.9 %
5-40 SYRINGE (ML) INJECTION EVERY 12 HOURS SCHEDULED
Status: DISCONTINUED | OUTPATIENT
Start: 2023-12-10 | End: 2023-12-14 | Stop reason: HOSPADM

## 2023-12-10 RX ORDER — DEXAMETHASONE SODIUM PHOSPHATE 10 MG/ML
10 INJECTION, SOLUTION INTRAMUSCULAR; INTRAVENOUS EVERY 6 HOURS
Status: DISCONTINUED | OUTPATIENT
Start: 2023-12-10 | End: 2023-12-11

## 2023-12-10 RX ORDER — SODIUM CHLORIDE 0.9 % (FLUSH) 0.9 %
5-40 SYRINGE (ML) INJECTION PRN
Status: CANCELLED | OUTPATIENT
Start: 2023-12-10

## 2023-12-10 RX ORDER — DULOXETIN HYDROCHLORIDE 30 MG/1
60 CAPSULE, DELAYED RELEASE ORAL DAILY
Status: DISCONTINUED | OUTPATIENT
Start: 2023-12-11 | End: 2023-12-14 | Stop reason: HOSPADM

## 2023-12-10 RX ORDER — SODIUM CHLORIDE 9 MG/ML
INJECTION, SOLUTION INTRAVENOUS PRN
Status: CANCELLED | OUTPATIENT
Start: 2023-12-10

## 2023-12-10 RX ORDER — ACETAMINOPHEN 650 MG/1
650 SUPPOSITORY RECTAL EVERY 6 HOURS PRN
Status: DISCONTINUED | OUTPATIENT
Start: 2023-12-10 | End: 2023-12-14 | Stop reason: HOSPADM

## 2023-12-10 RX ORDER — DEXTROSE MONOHYDRATE 100 MG/ML
INJECTION, SOLUTION INTRAVENOUS CONTINUOUS PRN
Status: DISCONTINUED | OUTPATIENT
Start: 2023-12-10 | End: 2023-12-14 | Stop reason: HOSPADM

## 2023-12-10 RX ORDER — DIPHENHYDRAMINE HYDROCHLORIDE 50 MG/ML
25 INJECTION INTRAMUSCULAR; INTRAVENOUS EVERY 6 HOURS PRN
Status: CANCELLED | OUTPATIENT
Start: 2023-12-10

## 2023-12-10 RX ORDER — HYDRALAZINE HYDROCHLORIDE 50 MG/1
50 TABLET, FILM COATED ORAL EVERY 8 HOURS SCHEDULED
Status: CANCELLED | OUTPATIENT
Start: 2023-12-11

## 2023-12-10 RX ORDER — HYDRALAZINE HYDROCHLORIDE 50 MG/1
50 TABLET, FILM COATED ORAL EVERY 8 HOURS SCHEDULED
Status: DISCONTINUED | OUTPATIENT
Start: 2023-12-10 | End: 2023-12-10

## 2023-12-10 RX ORDER — IPRATROPIUM BROMIDE AND ALBUTEROL SULFATE 2.5; .5 MG/3ML; MG/3ML
1 SOLUTION RESPIRATORY (INHALATION)
Status: DISCONTINUED | OUTPATIENT
Start: 2023-12-10 | End: 2023-12-14 | Stop reason: HOSPADM

## 2023-12-10 RX ORDER — POLYETHYLENE GLYCOL 3350 17 G/17G
17 POWDER, FOR SOLUTION ORAL DAILY PRN
Status: DISCONTINUED | OUTPATIENT
Start: 2023-12-10 | End: 2023-12-12

## 2023-12-10 RX ORDER — FLUTICASONE PROPIONATE 50 MCG
2 SPRAY, SUSPENSION (ML) NASAL DAILY
Status: DISCONTINUED | OUTPATIENT
Start: 2023-12-11 | End: 2023-12-14 | Stop reason: HOSPADM

## 2023-12-10 RX ORDER — DOCUSATE SODIUM 100 MG/1
200 CAPSULE, LIQUID FILLED ORAL NIGHTLY
Status: DISCONTINUED | OUTPATIENT
Start: 2023-12-10 | End: 2023-12-12

## 2023-12-10 RX ORDER — OXYBUTYNIN CHLORIDE 10 MG/1
10 TABLET, EXTENDED RELEASE ORAL DAILY
Status: CANCELLED | OUTPATIENT
Start: 2023-12-11

## 2023-12-10 RX ORDER — ONDANSETRON 2 MG/ML
4 INJECTION INTRAMUSCULAR; INTRAVENOUS EVERY 6 HOURS PRN
Status: DISCONTINUED | OUTPATIENT
Start: 2023-12-10 | End: 2023-12-14 | Stop reason: HOSPADM

## 2023-12-10 RX ORDER — DEXTROSE MONOHYDRATE 100 MG/ML
INJECTION, SOLUTION INTRAVENOUS CONTINUOUS PRN
Status: CANCELLED | OUTPATIENT
Start: 2023-12-10

## 2023-12-10 RX ORDER — LIDOCAINE 4 G/G
1 PATCH TOPICAL DAILY
Status: DISCONTINUED | OUTPATIENT
Start: 2023-12-11 | End: 2023-12-14 | Stop reason: HOSPADM

## 2023-12-10 RX ORDER — FENTANYL CITRATE 50 UG/ML
50 INJECTION, SOLUTION INTRAMUSCULAR; INTRAVENOUS ONCE
Status: COMPLETED | OUTPATIENT
Start: 2023-12-10 | End: 2023-12-10

## 2023-12-10 RX ORDER — CETIRIZINE HYDROCHLORIDE 10 MG/1
10 TABLET ORAL DAILY
Status: DISCONTINUED | OUTPATIENT
Start: 2023-12-11 | End: 2023-12-14 | Stop reason: HOSPADM

## 2023-12-10 RX ORDER — LORAZEPAM 2 MG/ML
1 INJECTION INTRAMUSCULAR EVERY 6 HOURS PRN
Status: DISCONTINUED | OUTPATIENT
Start: 2023-12-10 | End: 2023-12-14 | Stop reason: HOSPADM

## 2023-12-10 RX ORDER — SODIUM CHLORIDE 9 MG/ML
INJECTION, SOLUTION INTRAVENOUS CONTINUOUS
Status: DISCONTINUED | OUTPATIENT
Start: 2023-12-10 | End: 2023-12-10

## 2023-12-10 RX ORDER — CARVEDILOL 25 MG/1
25 TABLET ORAL 2 TIMES DAILY WITH MEALS
Status: DISCONTINUED | OUTPATIENT
Start: 2023-12-10 | End: 2023-12-12

## 2023-12-10 RX ORDER — BUDESONIDE 0.5 MG/2ML
0.5 INHALANT ORAL
Status: CANCELLED | OUTPATIENT
Start: 2023-12-10 | End: 2025-01-09

## 2023-12-10 RX ORDER — DULOXETIN HYDROCHLORIDE 30 MG/1
60 CAPSULE, DELAYED RELEASE ORAL DAILY
Status: CANCELLED | OUTPATIENT
Start: 2023-12-11

## 2023-12-10 RX ORDER — LEVETIRACETAM 500 MG/5ML
750 INJECTION, SOLUTION, CONCENTRATE INTRAVENOUS EVERY 12 HOURS
Status: CANCELLED | OUTPATIENT
Start: 2023-12-11

## 2023-12-10 RX ORDER — DIMETHICONE, OXYBENZONE, AND PADIMATE O 2; 2.5; 6.6 G/100G; G/100G; G/100G
STICK TOPICAL PRN
Status: CANCELLED | OUTPATIENT
Start: 2023-12-10

## 2023-12-10 RX ORDER — INSULIN LISPRO 100 [IU]/ML
0-4 INJECTION, SOLUTION INTRAVENOUS; SUBCUTANEOUS EVERY 6 HOURS
Status: CANCELLED | OUTPATIENT
Start: 2023-12-10

## 2023-12-10 RX ORDER — HYDRALAZINE HYDROCHLORIDE 50 MG/1
50 TABLET, FILM COATED ORAL EVERY 8 HOURS SCHEDULED
Status: DISCONTINUED | OUTPATIENT
Start: 2023-12-11 | End: 2023-12-10 | Stop reason: HOSPADM

## 2023-12-10 RX ORDER — ACETAMINOPHEN 650 MG/1
650 SUPPOSITORY RECTAL EVERY 6 HOURS PRN
Status: CANCELLED | OUTPATIENT
Start: 2023-12-10

## 2023-12-10 RX ORDER — BACLOFEN 10 MG/1
10 TABLET ORAL 2 TIMES DAILY
Status: DISCONTINUED | OUTPATIENT
Start: 2023-12-10 | End: 2023-12-14 | Stop reason: HOSPADM

## 2023-12-10 RX ORDER — HYDRALAZINE HYDROCHLORIDE 20 MG/ML
10 INJECTION INTRAMUSCULAR; INTRAVENOUS EVERY 4 HOURS PRN
Status: CANCELLED | OUTPATIENT
Start: 2023-12-10

## 2023-12-10 RX ORDER — ENOXAPARIN SODIUM 100 MG/ML
30 INJECTION SUBCUTANEOUS DAILY
Status: CANCELLED | OUTPATIENT
Start: 2023-12-11

## 2023-12-10 RX ORDER — ACETAMINOPHEN 325 MG/1
650 TABLET ORAL EVERY 6 HOURS PRN
Status: DISCONTINUED | OUTPATIENT
Start: 2023-12-10 | End: 2023-12-14 | Stop reason: HOSPADM

## 2023-12-10 RX ORDER — SODIUM CHLORIDE 0.9 % (FLUSH) 0.9 %
5-40 SYRINGE (ML) INJECTION EVERY 12 HOURS SCHEDULED
Status: DISCONTINUED | OUTPATIENT
Start: 2023-12-10 | End: 2023-12-11

## 2023-12-10 RX ORDER — SODIUM CHLORIDE 9 MG/ML
INJECTION, SOLUTION INTRAVENOUS CONTINUOUS
Status: CANCELLED | OUTPATIENT
Start: 2023-12-10

## 2023-12-10 RX ORDER — ATORVASTATIN CALCIUM 10 MG/1
10 TABLET, FILM COATED ORAL DAILY
Status: CANCELLED | OUTPATIENT
Start: 2023-12-10

## 2023-12-10 RX ORDER — DIMETHICONE, OXYBENZONE, AND PADIMATE O 2; 2.5; 6.6 G/100G; G/100G; G/100G
STICK TOPICAL PRN
Status: DISCONTINUED | OUTPATIENT
Start: 2023-12-10 | End: 2023-12-14 | Stop reason: HOSPADM

## 2023-12-10 RX ORDER — ENOXAPARIN SODIUM 100 MG/ML
40 INJECTION SUBCUTANEOUS DAILY
Status: DISCONTINUED | OUTPATIENT
Start: 2023-12-11 | End: 2023-12-14 | Stop reason: HOSPADM

## 2023-12-10 RX ORDER — ONDANSETRON 4 MG/1
4 TABLET, ORALLY DISINTEGRATING ORAL EVERY 8 HOURS PRN
Status: CANCELLED | OUTPATIENT
Start: 2023-12-10

## 2023-12-10 RX ORDER — DIMETHICONE, OXYBENZONE, AND PADIMATE O 2; 2.5; 6.6 G/100G; G/100G; G/100G
STICK TOPICAL PRN
Status: DISCONTINUED | OUTPATIENT
Start: 2023-12-10 | End: 2023-12-10 | Stop reason: HOSPADM

## 2023-12-10 RX ORDER — MIDAZOLAM HYDROCHLORIDE 2 MG/2ML
2 INJECTION, SOLUTION INTRAMUSCULAR; INTRAVENOUS ONCE
Status: COMPLETED | OUTPATIENT
Start: 2023-12-10 | End: 2023-12-10

## 2023-12-10 RX ORDER — ASPIRIN 81 MG/1
81 TABLET, CHEWABLE ORAL DAILY
Status: CANCELLED | OUTPATIENT
Start: 2023-12-11

## 2023-12-10 RX ORDER — MONTELUKAST SODIUM 10 MG/1
10 TABLET ORAL NIGHTLY
Status: DISCONTINUED | OUTPATIENT
Start: 2023-12-10 | End: 2023-12-14 | Stop reason: HOSPADM

## 2023-12-10 RX ORDER — ASPIRIN 81 MG/1
81 TABLET, CHEWABLE ORAL DAILY
Status: DISCONTINUED | OUTPATIENT
Start: 2023-12-11 | End: 2023-12-14 | Stop reason: HOSPADM

## 2023-12-10 RX ORDER — SODIUM CHLORIDE 0.9 % (FLUSH) 0.9 %
5-40 SYRINGE (ML) INJECTION EVERY 12 HOURS SCHEDULED
Status: CANCELLED | OUTPATIENT
Start: 2023-12-10

## 2023-12-10 RX ORDER — MONTELUKAST SODIUM 10 MG/1
10 TABLET ORAL NIGHTLY
Status: CANCELLED | OUTPATIENT
Start: 2023-12-10

## 2023-12-10 RX ORDER — TRAZODONE HYDROCHLORIDE 50 MG/1
100 TABLET ORAL NIGHTLY
Status: CANCELLED | OUTPATIENT
Start: 2023-12-10

## 2023-12-10 RX ORDER — ACETAMINOPHEN 325 MG/1
650 TABLET ORAL EVERY 6 HOURS PRN
Status: CANCELLED | OUTPATIENT
Start: 2023-12-10

## 2023-12-10 RX ORDER — CETIRIZINE HYDROCHLORIDE 10 MG/1
10 TABLET ORAL DAILY
Status: CANCELLED | OUTPATIENT
Start: 2023-12-11

## 2023-12-10 RX ORDER — BACLOFEN 10 MG/1
10 TABLET ORAL 2 TIMES DAILY
Status: CANCELLED | OUTPATIENT
Start: 2023-12-10

## 2023-12-10 RX ORDER — LORAZEPAM 2 MG/ML
1 INJECTION INTRAMUSCULAR EVERY 6 HOURS PRN
Status: CANCELLED | OUTPATIENT
Start: 2023-12-10

## 2023-12-10 RX ORDER — ONDANSETRON 2 MG/ML
4 INJECTION INTRAMUSCULAR; INTRAVENOUS EVERY 6 HOURS PRN
Status: CANCELLED | OUTPATIENT
Start: 2023-12-10

## 2023-12-10 RX ORDER — MAGNESIUM SULFATE IN WATER 40 MG/ML
4000 INJECTION, SOLUTION INTRAVENOUS ONCE
Status: COMPLETED | OUTPATIENT
Start: 2023-12-10 | End: 2023-12-10

## 2023-12-10 RX ORDER — FLUTICASONE PROPIONATE 50 MCG
2 SPRAY, SUSPENSION (ML) NASAL DAILY
Status: CANCELLED | OUTPATIENT
Start: 2023-12-11

## 2023-12-10 RX ORDER — ATORVASTATIN CALCIUM 10 MG/1
10 TABLET, FILM COATED ORAL DAILY
Status: DISCONTINUED | OUTPATIENT
Start: 2023-12-10 | End: 2023-12-14 | Stop reason: HOSPADM

## 2023-12-10 RX ORDER — OXYBUTYNIN CHLORIDE 10 MG/1
10 TABLET, EXTENDED RELEASE ORAL DAILY
Status: DISCONTINUED | OUTPATIENT
Start: 2023-12-11 | End: 2023-12-14 | Stop reason: HOSPADM

## 2023-12-10 RX ORDER — GABAPENTIN 100 MG/1
100 CAPSULE ORAL 2 TIMES DAILY
Status: CANCELLED | OUTPATIENT
Start: 2023-12-10

## 2023-12-10 RX ORDER — SODIUM CHLORIDE 0.9 % (FLUSH) 0.9 %
5-40 SYRINGE (ML) INJECTION PRN
Status: DISCONTINUED | OUTPATIENT
Start: 2023-12-10 | End: 2023-12-11

## 2023-12-10 RX ORDER — LIDOCAINE 4 G/G
1 PATCH TOPICAL DAILY
Status: CANCELLED | OUTPATIENT
Start: 2023-12-11

## 2023-12-10 RX ORDER — ONDANSETRON 4 MG/1
4 TABLET, ORALLY DISINTEGRATING ORAL EVERY 8 HOURS PRN
Status: DISCONTINUED | OUTPATIENT
Start: 2023-12-10 | End: 2023-12-14 | Stop reason: HOSPADM

## 2023-12-10 RX ORDER — INSULIN LISPRO 100 [IU]/ML
0-4 INJECTION, SOLUTION INTRAVENOUS; SUBCUTANEOUS EVERY 6 HOURS
Status: DISCONTINUED | OUTPATIENT
Start: 2023-12-10 | End: 2023-12-11

## 2023-12-10 RX ORDER — ARFORMOTEROL TARTRATE 15 UG/2ML
15 SOLUTION RESPIRATORY (INHALATION)
Status: DISCONTINUED | OUTPATIENT
Start: 2023-12-10 | End: 2023-12-14 | Stop reason: HOSPADM

## 2023-12-10 RX ORDER — HYDRALAZINE HYDROCHLORIDE 50 MG/1
50 TABLET, FILM COATED ORAL EVERY 8 HOURS SCHEDULED
Status: DISCONTINUED | OUTPATIENT
Start: 2023-12-11 | End: 2023-12-13

## 2023-12-10 RX ORDER — DIPHENHYDRAMINE HYDROCHLORIDE 50 MG/ML
25 INJECTION INTRAMUSCULAR; INTRAVENOUS EVERY 6 HOURS PRN
Status: DISCONTINUED | OUTPATIENT
Start: 2023-12-10 | End: 2023-12-14 | Stop reason: HOSPADM

## 2023-12-10 RX ORDER — DOCUSATE SODIUM 100 MG/1
200 CAPSULE, LIQUID FILLED ORAL NIGHTLY
Status: CANCELLED | OUTPATIENT
Start: 2023-12-10

## 2023-12-10 RX ORDER — POLYETHYLENE GLYCOL 3350 17 G/17G
17 POWDER, FOR SOLUTION ORAL DAILY PRN
Status: CANCELLED | OUTPATIENT
Start: 2023-12-10

## 2023-12-10 RX ORDER — SODIUM CHLORIDE 0.9 % (FLUSH) 0.9 %
5-40 SYRINGE (ML) INJECTION PRN
Status: DISCONTINUED | OUTPATIENT
Start: 2023-12-10 | End: 2023-12-14 | Stop reason: HOSPADM

## 2023-12-10 RX ORDER — LEVETIRACETAM 500 MG/5ML
750 INJECTION, SOLUTION, CONCENTRATE INTRAVENOUS EVERY 12 HOURS
Status: DISCONTINUED | OUTPATIENT
Start: 2023-12-11 | End: 2023-12-13

## 2023-12-10 RX ORDER — TRAZODONE HYDROCHLORIDE 50 MG/1
100 TABLET ORAL NIGHTLY
Status: DISCONTINUED | OUTPATIENT
Start: 2023-12-10 | End: 2023-12-14 | Stop reason: HOSPADM

## 2023-12-10 RX ORDER — INSULIN LISPRO 100 [IU]/ML
0-4 INJECTION, SOLUTION INTRAVENOUS; SUBCUTANEOUS EVERY 6 HOURS
Status: DISCONTINUED | OUTPATIENT
Start: 2023-12-10 | End: 2023-12-10 | Stop reason: HOSPADM

## 2023-12-10 RX ADMIN — ACETAMINOPHEN 650 MG: 325 TABLET ORAL at 22:02

## 2023-12-10 RX ADMIN — SODIUM CHLORIDE, PRESERVATIVE FREE 10 ML: 5 INJECTION INTRAVENOUS at 07:58

## 2023-12-10 RX ADMIN — AMPICILLIN SODIUM 2000 MG: 2 INJECTION, POWDER, FOR SOLUTION INTRAMUSCULAR; INTRAVENOUS at 22:39

## 2023-12-10 RX ADMIN — DEXAMETHASONE SODIUM PHOSPHATE 10 MG: 10 INJECTION, SOLUTION INTRAMUSCULAR; INTRAVENOUS at 22:02

## 2023-12-10 RX ADMIN — MAGNESIUM SULFATE IN WATER FOR 4000 MG: 40 INJECTION INTRAVENOUS at 07:59

## 2023-12-10 RX ADMIN — MICONAZOLE NITRATE: 20 POWDER TOPICAL at 07:58

## 2023-12-10 RX ADMIN — POTASSIUM PHOSPHATE, MONOBASIC AND POTASSIUM PHOSPHATE, DIBASIC 15 MMOL: 224; 236 INJECTION, SOLUTION, CONCENTRATE INTRAVENOUS at 02:33

## 2023-12-10 RX ADMIN — CLONIDINE HYDROCHLORIDE 0.3 MG: 0.2 TABLET ORAL at 11:21

## 2023-12-10 RX ADMIN — SODIUM CHLORIDE 10 MG/HR: 9 INJECTION, SOLUTION INTRAVENOUS at 07:57

## 2023-12-10 RX ADMIN — SODIUM CHLORIDE 7 MG/HR: 9 INJECTION, SOLUTION INTRAVENOUS at 16:43

## 2023-12-10 RX ADMIN — MIDAZOLAM HYDROCHLORIDE 2 MG: 1 INJECTION, SOLUTION INTRAMUSCULAR; INTRAVENOUS at 01:15

## 2023-12-10 RX ADMIN — NICARDIPINE HYDROCHLORIDE 7 MG/HR: 2.5 INJECTION INTRAVENOUS at 19:05

## 2023-12-10 RX ADMIN — CLONIDINE HYDROCHLORIDE 0.3 MG: 0.2 TABLET ORAL at 22:02

## 2023-12-10 RX ADMIN — VANCOMYCIN HYDROCHLORIDE 1750 MG: 10 INJECTION, POWDER, LYOPHILIZED, FOR SOLUTION INTRAVENOUS at 22:36

## 2023-12-10 RX ADMIN — CARVEDILOL 25 MG: 6.25 TABLET, FILM COATED ORAL at 22:03

## 2023-12-10 RX ADMIN — LEVETIRACETAM 750 MG: 100 INJECTION, SOLUTION INTRAVENOUS at 12:25

## 2023-12-10 RX ADMIN — ARFORMOTEROL TARTRATE 15 MCG: 15 SOLUTION RESPIRATORY (INHALATION) at 20:34

## 2023-12-10 RX ADMIN — SODIUM CHLORIDE: 9 INJECTION, SOLUTION INTRAVENOUS at 19:05

## 2023-12-10 RX ADMIN — SENNOSIDES 10 ML: 8.8 LIQUID ORAL at 22:03

## 2023-12-10 RX ADMIN — BUDESONIDE INHALATION 500 MCG: 0.5 SUSPENSION RESPIRATORY (INHALATION) at 08:10

## 2023-12-10 RX ADMIN — ARFORMOTEROL TARTRATE 15 MCG: 15 SOLUTION RESPIRATORY (INHALATION) at 08:10

## 2023-12-10 RX ADMIN — SODIUM CHLORIDE: 9 INJECTION, SOLUTION INTRAVENOUS at 22:30

## 2023-12-10 RX ADMIN — MICONAZOLE NITRATE: 20.6 POWDER TOPICAL at 22:01

## 2023-12-10 RX ADMIN — ENOXAPARIN SODIUM 30 MG: 100 INJECTION SUBCUTANEOUS at 07:58

## 2023-12-10 RX ADMIN — FENTANYL CITRATE 50 MCG: 50 INJECTION, SOLUTION INTRAMUSCULAR; INTRAVENOUS at 01:20

## 2023-12-10 RX ADMIN — SODIUM CHLORIDE, PRESERVATIVE FREE 10 ML: 5 INJECTION INTRAVENOUS at 22:02

## 2023-12-10 RX ADMIN — CLONIDINE HYDROCHLORIDE 0.3 MG: 0.2 TABLET ORAL at 16:47

## 2023-12-10 RX ADMIN — SODIUM CHLORIDE 10 MG/HR: 9 INJECTION, SOLUTION INTRAVENOUS at 02:40

## 2023-12-10 RX ADMIN — SODIUM CHLORIDE 9 MG/HR: 9 INJECTION, SOLUTION INTRAVENOUS at 13:24

## 2023-12-10 RX ADMIN — SODIUM CHLORIDE 12.5 MG/HR: 9 INJECTION, SOLUTION INTRAVENOUS at 04:34

## 2023-12-10 RX ADMIN — SODIUM CHLORIDE, PRESERVATIVE FREE 10 ML: 5 INJECTION INTRAVENOUS at 22:04

## 2023-12-10 RX ADMIN — PANTOPRAZOLE SODIUM 40 MG: 40 INJECTION, POWDER, FOR SOLUTION INTRAVENOUS at 07:58

## 2023-12-10 RX ADMIN — ACYCLOVIR SODIUM 650 MG: 50 INJECTION, SOLUTION INTRAVENOUS at 23:17

## 2023-12-10 RX ADMIN — IPRATROPIUM BROMIDE AND ALBUTEROL SULFATE 1 DOSE: 2.5; .5 SOLUTION RESPIRATORY (INHALATION) at 20:34

## 2023-12-10 RX ADMIN — LEVETIRACETAM 750 MG: 100 INJECTION, SOLUTION INTRAVENOUS at 02:48

## 2023-12-10 RX ADMIN — NICARDIPINE HYDROCHLORIDE 7 MG/HR: 2.5 INJECTION INTRAVENOUS at 20:32

## 2023-12-10 RX ADMIN — BUDESONIDE INHALATION 500 MCG: 0.5 SUSPENSION RESPIRATORY (INHALATION) at 20:34

## 2023-12-10 RX ADMIN — SODIUM CHLORIDE 10 MG/HR: 9 INJECTION, SOLUTION INTRAVENOUS at 10:44

## 2023-12-10 RX ADMIN — MAGNESIUM SULFATE HEPTAHYDRATE 2000 MG: 40 INJECTION, SOLUTION INTRAVENOUS at 02:28

## 2023-12-10 ASSESSMENT — PAIN SCALES - GENERAL
PAINLEVEL_OUTOF10: 0
PAINLEVEL_OUTOF10: 0

## 2023-12-10 ASSESSMENT — PAIN SCALES - WONG BAKER: WONGBAKER_NUMERICALRESPONSE: 6

## 2023-12-10 ASSESSMENT — PAIN DESCRIPTION - ONSET: ONSET: OTHER (COMMENT)

## 2023-12-10 ASSESSMENT — PAIN DESCRIPTION - ORIENTATION: ORIENTATION: OTHER (COMMENT)

## 2023-12-10 ASSESSMENT — PAIN DESCRIPTION - PAIN TYPE: TYPE: OTHER (COMMENT)

## 2023-12-10 ASSESSMENT — PAIN DESCRIPTION - FREQUENCY: FREQUENCY: OTHER (COMMENT)

## 2023-12-10 ASSESSMENT — PAIN - FUNCTIONAL ASSESSMENT: PAIN_FUNCTIONAL_ASSESSMENT: ACTIVITIES ARE NOT PREVENTED

## 2023-12-10 ASSESSMENT — PAIN DESCRIPTION - LOCATION: LOCATION: ABDOMEN

## 2023-12-10 ASSESSMENT — PAIN DESCRIPTION - DESCRIPTORS: DESCRIPTORS: PATIENT UNABLE TO DESCRIBE

## 2023-12-10 NOTE — PROGRESS NOTES
.   Critical Care Team - Daily Progress Note      Date and time: 12/10/2023 11:12 AM  Patient's name:  Steph Gonzáles  Medical Record Number: [id number]  Patient's account/billing number: [id number]  Patient's YOB: 1955  Age: 67 y.o.  Date of Admission: 12/5/2023  4:59 PM  Length of stay during current admission: 5      Primary Care Physician: Vince Beatty APRN - CNP  ICU Attending Physician: Dr. Villegas    Code Status: Full Code    Reason for ICU admission: Hypertensive Crisis      SUBJECTIVE:     OVERNIGHT EVENTS:             ICU Day 2 : No acute overnight changes.  Patient has been significantly deviated from her normal baseline mental status. She is awake but not oriented to time, place or self.  She is agitated.  Her blood pressure has been controlled with Cardene drip.  Urinary tract infection has been improved.  She needs further neurological evaluation and may need to be transferred downtown for neurology evaluation.      Initial HPI:     The patient is a 67 y.o. female with significant past medical history of CAD s/p CABG in 2011, heart failure with improved EF, COPD, chronic hypoxic respiratory failure 4 L nasal cannula at baseline, type II DM, HTN, HLD, stroke, DAYAN, obesity, and CKD.  She initially presented to the ED on 12/5 for generalized weakness and fall.  She was walking up a ramp in her house when she fell.  She states she hit everything including her head.  She denies lightheadedness, dizziness, or loss of consciousness.  States she is felt weak recently.     During her hospital course she was found to have a E. coli UTI and was started on Rocephin, on 12/7 at approximately 11 AM stroke alert was called with NIH of 9 CT of the head, MRI/MRA of the head all negative.  She was not a candidate for thrombolytic therapy due to encephalopathy.  EEG was completed on 12/8 revealed abnormal EEG with presence of diffuse slowing consistent with moderate encephalopathy.  No epileptic  ABG is unremarkable. She is currently seen in bed agitated, her speech is incoherent, her strength is equal bilaterally as she was able to grasp my hands on command, when you speak to her she is able to give you her focus and attempts to speak to you however, her words are incoherent. Pupils are equal round and reactive, she is moving all her extremities equally bilaterally  - At this time she will be managed in ICU for tight blood pressure control. If no improvement in neurological function would recommend transfer to tertiary care for further neurological evaluation   -12/10, Patient has been deviated from her normal baseline mental status. She is confused. She may need to be transferred to [facility] for neurology evaluation.       Seizure  - Possible seizures, EEG on 12/8 with no epileptiform activity observed  -This evening received 5 mg of IV Versed and loaded with 1500 mg of Keppra and started on 750 mg every 12 hours  - Primary team spoke to neurologist this evening, at this time neurology recommended tighter blood pressure control monitor to see if improvement in symptom  - If no improvements in symptoms  would recommend transfer to tertiary care for neurological evaluation     Stroke  - h/x of remote cerebellar stroke  - MRI notes small chronic lacunar infarct in the right cerebellar hemisphere     Respiratory   Chronic hypoxic respiratory failure  - Stable baseline 4 L nasal cannula     Asthma   - Not in acute exacerbation  - Continue Brovana and Pulmicort     Cardiovascular   Hypertensive emergency  - HTN with SBP consistently in the 190s to 200 and DBP consistently above 100 and BP as high as 251/127 with acute encephalopathy  - Unable to take oral meds at this time will need NG tube, not responsive to IV labetalol, will initiate nicardipine drip for goal of rapid reduction of SBP to 180 and maintain SBP over 160-180 over night  - H.x of HTN as well, has not had home cardiac meds since admission on

## 2023-12-10 NOTE — PLAN OF CARE
Spoke with neurologist Dr Merlyn Michael again for updates. Pt was transferred to the ICU overnight to start Cardene drip. Central line and NG tube were placed. Cardene drip started, BP today 160-170s. Pt was also given Clonidine via NG tube. Metabolic workup came back unremarkable. Received 3 doses of keppra so far. Mentation still about the same. Pt looks at you when u call her name, tries to talk but seems like unable to. Does not follow commands. At this time, Dr Merlyn Michael feels that she is appropriate for transfer to [facility] for further evaluation by he neurology team.     Transfer initiated.  Pt accepted by hospitalist and Intensivist. Awaiting bed

## 2023-12-10 NOTE — PATIENT CARE CONFERENCE
Intensive Care Daily Quality Rounding Checklist      ICU Team Members: Dr. Letitia Haro, resident, charge nurse, bedside nurse, RT    ICU Day #: 2    Intubation Date:  N/A    Ventilator Day #: N/A    Central Line Insertion Date:  Dec 10th        Day #: 1        Indication: bp meds for hypertensive crisis     Arterial Line Insertion Date:  Dec 10th      Day #: 1    Temporary Hemodialysis Catheter Insertion Date:  N/A      Day # N/A    DVT Prophylaxis: Lovenox    GI Prophylaxis: protonix    Olivas Catheter Insertion Date:  Dec 9th       Day #: 2      Indications:  fluid volume management in icu      Continued need (if yes, reason documented and discussed with physician):     Skin Issues/ Wounds and ordered treatment discussed on rounds: blister under R breast    Goals/ Plans for the Day: Daily labs and replace/transfuse as needed. Wean cardene as able. Place NG. Resume home antihypertensives. Wean oxygen as able. Continue critical care.     Reviewed plan and goals for day with patient and/or representative:

## 2023-12-10 NOTE — PLAN OF CARE
Problem: Discharge Planning  Goal: Discharge to home or other facility with appropriate resources  12/10/2023 0852 by Kelly Herrera RN  Outcome: Progressing  12/10/2023 0504 by Doris Jimenez RN  Outcome: Progressing     Problem: Safety - Adult  Goal: Free from fall injury  12/10/2023 0852 by Kelly Herrera RN  Outcome: Progressing  Flowsheets (Taken 12/10/2023 0800)  Free From Fall Injury: Instruct family/caregiver on patient safety  12/10/2023 0504 by Doris Jimenez RN  Outcome: Progressing     Problem: Chronic Conditions and Co-morbidities  Goal: Patient's chronic conditions and co-morbidity symptoms are monitored and maintained or improved  12/10/2023 0852 by Kelly Herrera RN  Outcome: Progressing  12/10/2023 0504 by Doris Jimenez RN  Outcome: Progressing     Problem: Skin/Tissue Integrity  Goal: Absence of new skin breakdown  Description: 1. Monitor for areas of redness and/or skin breakdown  2. Assess vascular access sites hourly  3. Every 4-6 hours minimum:  Change oxygen saturation probe site  4. Every 4-6 hours:  If on nasal continuous positive airway pressure, respiratory therapy assess nares and determine need for appliance change or resting period. 12/10/2023 0852 by Kelly Herrera RN  Outcome: Progressing  12/10/2023 0504 by Doris Jimenez RN  Outcome: Progressing     Problem: Pain  Goal: Verbalizes/displays adequate comfort level or baseline comfort level  12/10/2023 0852 by Kelly Herrera RN  Outcome: Progressing  12/10/2023 0504 by Doris Jimenez RN  Outcome: Progressing     Problem: Safety - Medical Restraint  Goal: Remains free of injury from restraints (Restraint for Interference with Medical Device)  Description: INTERVENTIONS:  1. Determine that other, less restrictive measures have been tried or would not be effective before applying the restraint  2. Evaluate the patient's condition at the time of restraint application  3.  Inform patient/family regarding the reason for

## 2023-12-10 NOTE — DISCHARGE SUMMARY
[facility] Physician Discharge Summary       [facility]  [address] [address]  [phone]          Activity level: As tolerated     Dispo: [facility]      Condition on discharge: Stable     Patient ID:  Sung De Paz  [id number]  75 y.o.  1955    Admit date: 12/5/2023    Discharge date and time:  12/10/2023  5:25 PM    Admission Diagnoses: Principal Problem:    ALEC (acute kidney injury) [facility]  Active Problems:    Fall    Acute cystitis without hematuria    Acute on chronic renal insufficiency    Hyperkalemia    Metabolic encephalopathy  Resolved Problems:    * No resolved hospital problems. *      Discharge Diagnoses: Principal Problem:    ALEC (acute kidney injury) ([address])  Active Problems:    Fall    Acute cystitis without hematuria    Acute on chronic renal insufficiency    Hyperkalemia    Metabolic encephalopathy  Resolved Problems:    * No resolved hospital problems. *      Consults:  IP CONSULT TO IV TEAM  IP CONSULT TO HEART FAILURE NURSE/COORDINATOR  IP CONSULT TO IV TEAM  IP CONSULT TO HOME CARE NEEDS  IP CONSULT TO IV TEAM  IP CONSULT TO IV TEAM  IP CONSULT TO IV TEAM  IP CONSULT TO DIETITIAN  IP CONSULT TO HOME CARE NEEDS  IP CONSULT TO [facility] Course:   Patient Sung De Paz is a 75 y.o. presented with Hyperkalemia [E87.5]  Acute on chronic renal insufficiency [N28.9, N18.9]  ALEC (acute kidney injury) ([address]) [N17.9]  Acute cystitis without hematuria [N30.00]  Fall, initial encounter [W19. XXXA]  Ambulatory dysfunction [R26.2]    Patient is 48-year-old female with history of CABG, CKD, HTN, HLD, chronic respiratory failure and CVA who presented to the ED on 12/5 due to generalized weakness and fall. Noted hyponatremia of 128 on admission which resolved the next day. Also patient had a UTI and was started on IV Rocephin.   On 12/7, patient became little hypertensive overnight with systolic in the 930I to 329N and was altered in

## 2023-12-10 NOTE — PROGRESS NOTES
Nurse to nurse report called to medical intensive [facility]). Spoke with accepting nurse Federica Luke RN). Patient assigned room  (unit phone # [phone]). Report given with all questions/concerns addressed. Informed that physicians ambulance scheduled to transfer patient in approximately 2 hrs.  Attempted to contact patients POA listed under emergency contacts (no answer)

## 2023-12-10 NOTE — PLAN OF CARE
Problem: Safety - Adult  Goal: Free from fall injury  Outcome: Progressing  Flowsheets (Taken 12/10/2023 1800)  Free From Fall Injury: Instruct family/caregiver on patient safety     Problem: Chronic Conditions and Co-morbidities  Goal: Patient's chronic conditions and co-morbidity symptoms are monitored and maintained or improved  Outcome: Progressing  Flowsheets (Taken 12/10/2023 1800)  Care Plan - Patient's Chronic Conditions and Co-Morbidity Symptoms are Monitored and Maintained or Improved: Monitor and assess patient's chronic conditions and comorbid symptoms for stability, deterioration, or improvement     Problem: Discharge Planning  Goal: Discharge to home or other facility with appropriate resources  Outcome: Progressing  Flowsheets (Taken 12/10/2023 1800)  Discharge to home or other facility with appropriate resources: Identify barriers to discharge with patient and caregiver     Problem: Pain  Goal: Verbalizes/displays adequate comfort level or baseline comfort level  Outcome: Progressing  Flowsheets (Taken 12/10/2023 1800)  Verbalizes/displays adequate comfort level or baseline comfort level: Encourage patient to monitor pain and request assistance     Problem: Skin/Tissue Integrity  Goal: Absence of new skin breakdown  Description: 1. Monitor for areas of redness and/or skin breakdown  2. Assess vascular access sites hourly  3. Every 4-6 hours minimum:  Change oxygen saturation probe site  4. Every 4-6 hours:  If on nasal continuous positive airway pressure, respiratory therapy assess nares and determine need for appliance change or resting period.   Outcome: Progressing     Problem: ABCDS Injury Assessment  Goal: Absence of physical injury  Outcome: Progressing  Flowsheets (Taken 12/10/2023 1800)  Absence of Physical Injury: Implement safety measures based on patient assessment

## 2023-12-10 NOTE — PLAN OF CARE
Problem: Safety - Medical Restraint  Goal: Remains free of injury from restraints (Restraint for Interference with Medical Device)  Description: INTERVENTIONS:  1. Determine that other, less restrictive measures have been tried or would not be effective before applying the restraint  2. Evaluate the patient's condition at the time of restraint application  3. Inform patient/family regarding the reason for restraint  4.  Q2H: Monitor safety, psychosocial status, comfort, nutrition and hydration  12/10/2023 1857 by Lynn Reich RN  Outcome: Completed Resolved  Echocardiogram shows preserved LV function and no WMA

## 2023-12-10 NOTE — PROCEDURES
Central Line Placement Procedure Note    Indication: poor peripheral access and centrally administered medications    Consent: The family members were counseled regarding the procedure, its indications, risks, potential complications and alternatives, and any questions were answered. Consent was obtained to proceed. Procedure: The patient was positioned appropriately and the skin over the right femoral vein was prepped with Chloraprep and draped in a sterile fashion. Local anesthesia was obtained by infiltration using 1.5 cc of 1% Lidocaine without epinephrine. A large bore needle was inserted into the vein under ultrasound guidance. A guide wire was then inserted into the vein through the needle. A triple lumen catheter was then inserted into the vessel over the guide wire using the Seldinger technique. All ports showed good, free flowing blood return and were flushed with saline solution. The catheter was then securely fastened to the skin with 2 sutures and covered with a sterile dressing. A post procedure X-ray was not indicated. The patient tolerated the procedure well. Complications: None        Arterial Line Placement Procedure Note                     Indication: Hypertensive emergency, unreliable zachariah blood pressures    Consent: The family members were counseled regarding the procedure, its indications, risks, potential complications and alternatives, and any questions were answered. Consent was obtained to proceed. Procedure: The skin over the right femoral artery was prepped with Chloraprep and draped in a sterile fashion. Local anesthesia was utilized as noted above. A needle was inserted into the artery under ultrasound guidance. A guide wire was then inserted onto the artery through the needle. A 20 gauge arterial line catheter was then inserted over the guide wire using the Seldinger technique. The transducer set was then attached and securely fastened to the skin with sutures.

## 2023-12-10 NOTE — PROGRESS NOTES
4 Eyes Skin Assessment     NAME:  Marquis Saldivar  YOB: 1955  MEDICAL RECORD NUMBER:  [id number]    The patient is being assessed for  Admission    I agree that at least one RN has performed a thorough Head to Toe Skin Assessment on the patient. ALL assessment sites listed below have been assessed. Areas assessed by both nurses:    Head, Face, Ears, Shoulders, Back, Chest, Arms, Elbows, Hands, Sacrum. Buttock, Coccyx, Ischium, Legs. Feet and Heels, and Under Medical Devices         Does the Patient have a Wound? No noted wound(s)       Jadon Prevention initiated by RN: Yes  Wound Care Orders initiated by RN: No    Pressure Injury (Stage 3,4, Unstageable, DTI, NWPT, and Complex wounds) if present, place Wound referral order by RN under : No    New Ostomies, if present place, Ostomy referral order under : No     Patient admitted with blister on R breast 6cm X 2cm. Heels and coccyx are red and blanchable.      Nurse 1 eSignature: Electronically signed by Michelle Joshi RN on 12/10/23 at 6:10 PM EST    **SHARE this note so that the co-signing nurse can place an eSignature**    Nurse 2 eSignature: Electronically signed by Makenna Ruiz RN on 12/10/23 at 6:58 PM EST

## 2023-12-10 NOTE — CONSULTS
Critical Care Admit/Consult Note         Patient - Steph Gonzáles   MRN -  [id number]   [id number]   - 1955      Date of Admission -  2023  4:59 PM  Date of evaluation -  2023  0538/0538-A   Hospital Day - 4            ADMIT/CONSULT DETAILS     Reason for Admit/Consult   Hypertensive emergency    Consulting Service/Physician   Consulting - Santana Davis MD  Primary Care Physician - Vince Beatty APRN - CNP         HPI   The patient is a 67 y.o. female with significant past medical history of CAD s/p CABG in , heart failure with improved EF, COPD, chronic hypoxic respiratory failure 4 L nasal cannula at baseline, type II DM, HTN, HLD, stroke, DAYAN, obesity, and CKD.  She initially presented to the ED on  for generalized weakness and fall.  She was walking up a ramp in her house when she fell.  She states she hit everything including her head.  She denies lightheadedness, dizziness, or loss of consciousness.  States she is felt weak recently.    During her hospital course she was found to have a E. coli UTI and was started on Rocephin, on  at approximately 11 AM stroke alert was called with NIH of 9 CT of the head, MRI/MRA of the head all negative.  She was not a candidate for thrombolytic therapy due to encephalopathy.  EEG was completed on  revealed abnormal EEG with presence of diffuse slowing consistent with moderate encephalopathy.  No epileptic activity observed.   hospitalist spoke to neurology due to continued and worsening encephalopathy who recommended 5 mg of Versed and she was loaded with 1500 mg of Keppra and started on maintenance 750 mg twice daily.  There is no improvement following.  She is having persistent HTN with SBP consistently in the 190s to 200 and DBP consistently above 100 and BP as high as 251/127 with acute encephalopathy despite administration of labetalol and Vasotec.  Neurology recommending tight BP control and monitoring  neurological status. She was sent to ICU to be initiated on a Cardene drip. She is currently seen in bed agitated, her speech is incoherent, her strength is equal bilaterally as she was able to grasp my hands on command, when you speak to her she is able to give you her focus and attempts to speak to you however, her words are incoherent. Pupils are equal round and reactive, she is moving all her extremities equally bilaterally. She is agitated in bed and pulling at equipment. Discussed in detail with family 2 daughters and a son, the plan of care as noted below. They are agreeable to current treatment plan all questions answered at this time.      Past Medical History         Diagnosis Date    Asthma     Atherosclerosis of native artery of left leg with rest pain ([address]) 11/09/2018    CAD (coronary artery disease) 2011    Cellulitis and abscess of trunk 04/14/2015    Cerebellar infarct ([address]) 04/03/2019    Remote, rt. cerebellum, head CT scan, 1/9/19    Chronic back pain     Chronic kidney disease     Chronic systolic congestive heart failure ([address]) 10/04/2013    Chronic venous insufficiency 10/11/2017    COPD (chronic obstructive pulmonary disease) ([address])     COVID-19     Depression     Diabetes mellitus ([address])     Diabetic neuropathy ([address])     Diverticulosis     Fatty liver 01/08/2016    per US    Glaucoma, open angle 02/01/2016    Mild-OU    Hepatic encephalopathy ([address]) 02/07/2016    resolved    Hiatal hernia     History of blood transfusion     Hyperlipidemia     Hyperplastic colon polyp     Hypertension     Incontinence     Liver mass     Morbid obesity ([address])     Movement disorder     Myocardial infarction ([address]) 2011    O2 dependent 09/16/2021    with bipap 3 liters    Osteoarthritis, generalized     Pain in both lower legs 10/11/2017    Peripheral vascular disease ([address])     Pneumonia 01/26/2014    Pulmonary edema     resolved    PVD (peripheral vascular disease) with claudication ([address]) 08/30/2017    Sleep

## 2023-12-10 NOTE — TRANSFER CENTER NOTE
Transfer check out note        Patient presented from [facility]. Was admitted for UTI and fall. Became very agitated and hypertensive. Had EEG from Dr. Robbin Stevens. It did not show active seizures but did show encephalopathy. MRA head and neck negative. MRI brain negative. She needs neurology evaluation. She was loaded with keppra and initiated on keppra for possible subclinical seizures. Received 3 days of rocephin for UTI which was sensitive. She also was started on a cardene drip for hypertensive emergency. Blood pressure better controlled, but still altered. As per Dr. Robbin Stevens he does want patient transferred here for neurology evaluation. Patient was accepted to MICU as long as critical care accepting.       Elda Sky DO

## 2023-12-10 NOTE — PROGRESS NOTES
Patients 2 daughters and son updated at bedside on the plan for the night. They were educated on the possible need for NG tube placement and the possible need for restraints after placing the NG tube due to patients mental status and her constant reaching for necessary medical equipment. The family verbalized their understanding. They were also updated on the potential need for a central line due to the patients poor venous access and need for a Cardene infusion, they were also agreeable to that.

## 2023-12-11 ENCOUNTER — APPOINTMENT (OUTPATIENT)
Dept: GENERAL RADIOLOGY | Age: 68
DRG: 077 | End: 2023-12-11
Attending: INTERNAL MEDICINE
Payer: MEDICARE

## 2023-12-11 ENCOUNTER — APPOINTMENT (OUTPATIENT)
Age: 68
DRG: 077 | End: 2023-12-11
Attending: INTERNAL MEDICINE
Payer: MEDICARE

## 2023-12-11 LAB
ALBUMIN SERPL-MCNC: 2.7 G/DL (ref 3.5–5.2)
ALP SERPL-CCNC: 166 U/L (ref 35–104)
ALT SERPL-CCNC: 23 U/L (ref 0–32)
ANION GAP SERPL CALCULATED.3IONS-SCNC: 15 MMOL/L (ref 7–16)
AST SERPL-CCNC: 8 U/L (ref 0–31)
B PARAP IS1001 DNA NPH QL NAA+NON-PROBE: NOT DETECTED
B PERT DNA SPEC QL NAA+PROBE: NOT DETECTED
BILIRUB DIRECT SERPL-MCNC: 0.2 MG/DL (ref 0–0.3)
BILIRUB INDIRECT SERPL-MCNC: 0.3 MG/DL (ref 0–1)
BILIRUB SERPL-MCNC: 0.5 MG/DL (ref 0–1.2)
BUN SERPL-MCNC: 22 MG/DL (ref 6–23)
C PNEUM DNA NPH QL NAA+NON-PROBE: NOT DETECTED
CALCIUM SERPL-MCNC: 7.7 MG/DL (ref 8.6–10.2)
CHLORIDE SERPL-SCNC: 110 MMOL/L (ref 98–107)
CO2 SERPL-SCNC: 20 MMOL/L (ref 22–29)
CREAT SERPL-MCNC: 1 MG/DL (ref 0.5–1)
ECHO AO ASC DIAM: 2.5 CM
ECHO AO ASCENDING AORTA INDEX: 1.37 CM/M2
ECHO AV AREA PEAK VELOCITY: 2 CM2
ECHO AV AREA VTI: 2.4 CM2
ECHO AV AREA/BSA PEAK VELOCITY: 1.1 CM2/M2
ECHO AV AREA/BSA VTI: 1.3 CM2/M2
ECHO AV CUSP MM: 1.6 CM
ECHO AV MEAN GRADIENT: 5 MMHG
ECHO AV MEAN VELOCITY: 1.1 M/S
ECHO AV PEAK GRADIENT: 11 MMHG
ECHO AV PEAK VELOCITY: 1.6 M/S
ECHO AV VELOCITY RATIO: 0.69
ECHO AV VTI: 36 CM
ECHO BSA: 1.89 M2
ECHO EST RA PRESSURE: 3 MMHG
ECHO LA DIAMETER INDEX: 2.57 CM/M2
ECHO LA DIAMETER: 4.7 CM
ECHO LA VOL A-L A2C: 110 ML (ref 22–52)
ECHO LA VOL A-L A4C: 106 ML (ref 22–52)
ECHO LA VOL MOD A2C: 103 ML (ref 22–52)
ECHO LA VOL MOD A4C: 100 ML (ref 22–52)
ECHO LA VOLUME AREA LENGTH: 114 ML
ECHO LA VOLUME INDEX A-L A2C: 60 ML/M2 (ref 16–34)
ECHO LA VOLUME INDEX A-L A4C: 58 ML/M2 (ref 16–34)
ECHO LA VOLUME INDEX AREA LENGTH: 62 ML/M2 (ref 16–34)
ECHO LA VOLUME INDEX MOD A2C: 56 ML/M2 (ref 16–34)
ECHO LA VOLUME INDEX MOD A4C: 55 ML/M2 (ref 16–34)
ECHO LV FRACTIONAL SHORTENING: 32 % (ref 28–44)
ECHO LV INTERNAL DIMENSION DIASTOLE INDEX: 2.57 CM/M2
ECHO LV INTERNAL DIMENSION DIASTOLIC: 4.7 CM (ref 3.9–5.3)
ECHO LV INTERNAL DIMENSION SYSTOLIC INDEX: 1.75 CM/M2
ECHO LV INTERNAL DIMENSION SYSTOLIC: 3.2 CM
ECHO LV IVSD: 1.8 CM (ref 0.6–0.9)
ECHO LV IVSS: 1.3 CM
ECHO LV MASS 2D: 356.4 G (ref 67–162)
ECHO LV MASS INDEX 2D: 194.7 G/M2 (ref 43–95)
ECHO LV POSTERIOR WALL DIASTOLIC: 1.6 CM (ref 0.6–0.9)
ECHO LV POSTERIOR WALL SYSTOLIC: 1.2 CM
ECHO LV RELATIVE WALL THICKNESS RATIO: 0.68
ECHO LVOT AREA: 3.1 CM2
ECHO LVOT AV VTI INDEX: 0.78
ECHO LVOT DIAM: 2 CM
ECHO LVOT MEAN GRADIENT: 3 MMHG
ECHO LVOT PEAK GRADIENT: 5 MMHG
ECHO LVOT PEAK VELOCITY: 1.1 M/S
ECHO LVOT STROKE VOLUME INDEX: 48.4 ML/M2
ECHO LVOT SV: 88.5 ML
ECHO LVOT VTI: 28.2 CM
ECHO MV "A" WAVE DURATION: 135 MSEC
ECHO MV A VELOCITY: 1.08 M/S
ECHO MV AREA PHT: 2.3 CM2
ECHO MV AREA VTI: 2.4 CM2
ECHO MV E DECELERATION TIME (DT): 311.7 MS
ECHO MV E VELOCITY: 0.99 M/S
ECHO MV E/A RATIO: 0.92
ECHO MV LVOT VTI INDEX: 1.28
ECHO MV MAX VELOCITY: 1.2 M/S
ECHO MV MEAN GRADIENT: 3 MMHG
ECHO MV MEAN VELOCITY: 0.8 M/S
ECHO MV PEAK GRADIENT: 6 MMHG
ECHO MV PRESSURE HALF TIME (PHT): 96.5 MS
ECHO MV VTI: 36.2 CM
ECHO PV MAX VELOCITY: 1 M/S
ECHO PV MEAN GRADIENT: 2 MMHG
ECHO PV MEAN VELOCITY: 0.7 M/S
ECHO PV PEAK GRADIENT: 4 MMHG
ECHO PV VTI: 21.4 CM
ECHO RIGHT VENTRICULAR SYSTOLIC PRESSURE (RVSP): 14 MMHG
ECHO RV INTERNAL DIMENSION: 3.4 CM
ECHO TV REGURGITANT MAX VELOCITY: 1.65 M/S
ECHO TV REGURGITANT PEAK GRADIENT: 11 MMHG
EKG ATRIAL RATE: 116 BPM
EKG P AXIS: 69 DEGREES
EKG P-R INTERVAL: 132 MS
EKG Q-T INTERVAL: 408 MS
EKG QRS DURATION: 130 MS
EKG QTC CALCULATION (BAZETT): 567 MS
EKG R AXIS: 82 DEGREES
EKG T AXIS: -9 DEGREES
EKG VENTRICULAR RATE: 116 BPM
ERYTHROCYTE [DISTWIDTH] IN BLOOD BY AUTOMATED COUNT: 13.2 % (ref 11.5–15)
FLUAV RNA NPH QL NAA+NON-PROBE: NOT DETECTED
FLUBV RNA NPH QL NAA+NON-PROBE: NOT DETECTED
GFR SERPL CREATININE-BSD FRML MDRD: >60 ML/MIN/1.73M2
GLUCOSE BLD-MCNC: 186 MG/DL (ref 74–99)
GLUCOSE BLD-MCNC: 221 MG/DL (ref 74–99)
GLUCOSE BLD-MCNC: 221 MG/DL (ref 74–99)
GLUCOSE BLD-MCNC: 222 MG/DL (ref 74–99)
GLUCOSE SERPL-MCNC: 187 MG/DL (ref 74–99)
HADV DNA NPH QL NAA+NON-PROBE: NOT DETECTED
HCOV 229E RNA NPH QL NAA+NON-PROBE: NOT DETECTED
HCOV HKU1 RNA NPH QL NAA+NON-PROBE: NOT DETECTED
HCOV NL63 RNA NPH QL NAA+NON-PROBE: NOT DETECTED
HCOV OC43 RNA NPH QL NAA+NON-PROBE: NOT DETECTED
HCT VFR BLD AUTO: 29.3 % (ref 34–48)
HGB BLD-MCNC: 9.1 G/DL (ref 11.5–15.5)
HMPV RNA NPH QL NAA+NON-PROBE: NOT DETECTED
HPIV1 RNA NPH QL NAA+NON-PROBE: NOT DETECTED
HPIV2 RNA NPH QL NAA+NON-PROBE: NOT DETECTED
HPIV3 RNA NPH QL NAA+NON-PROBE: NOT DETECTED
HPIV4 RNA NPH QL NAA+NON-PROBE: NOT DETECTED
INR PPP: 1.3
M PNEUMO DNA NPH QL NAA+NON-PROBE: NOT DETECTED
MAGNESIUM SERPL-MCNC: 2.4 MG/DL (ref 1.6–2.6)
MCH RBC QN AUTO: 31.6 PG (ref 26–35)
MCHC RBC AUTO-ENTMCNC: 31.1 G/DL (ref 32–34.5)
MCV RBC AUTO: 101.7 FL (ref 80–99.9)
MICROORGANISM SPEC CULT: NO GROWTH
MICROORGANISM SPEC CULT: NORMAL
PARTIAL THROMBOPLASTIN TIME: 31.7 SEC (ref 24.5–35.1)
PHOSPHATE SERPL-MCNC: 3.3 MG/DL (ref 2.5–4.5)
PLATELET # BLD AUTO: 248 K/UL (ref 130–450)
PMV BLD AUTO: 10.1 FL (ref 7–12)
POTASSIUM SERPL-SCNC: 4.8 MMOL/L (ref 3.5–5)
PROT SERPL-MCNC: 5.9 G/DL (ref 6.4–8.3)
PROTHROMBIN TIME: 14 SEC (ref 9.3–12.4)
RBC # BLD AUTO: 2.88 M/UL (ref 3.5–5.5)
RSV RNA NPH QL NAA+NON-PROBE: NOT DETECTED
RV+EV RNA NPH QL NAA+NON-PROBE: NOT DETECTED
SARS-COV-2 RNA NPH QL NAA+NON-PROBE: NOT DETECTED
SODIUM SERPL-SCNC: 145 MMOL/L (ref 132–146)
SPECIMEN DESCRIPTION: NORMAL
TROPONIN I SERPL HS-MCNC: 25 NG/L (ref 0–9)
WBC OTHER # BLD: 5.6 K/UL (ref 4.5–11.5)

## 2023-12-11 PROCEDURE — 2580000003 HC RX 258: Performed by: STUDENT IN AN ORGANIZED HEALTH CARE EDUCATION/TRAINING PROGRAM

## 2023-12-11 PROCEDURE — 85027 COMPLETE CBC AUTOMATED: CPT

## 2023-12-11 PROCEDURE — 94640 AIRWAY INHALATION TREATMENT: CPT

## 2023-12-11 PROCEDURE — 6370000000 HC RX 637 (ALT 250 FOR IP)

## 2023-12-11 PROCEDURE — 2580000003 HC RX 258: Performed by: INTERNAL MEDICINE

## 2023-12-11 PROCEDURE — 5A09357 ASSISTANCE WITH RESPIRATORY VENTILATION, LESS THAN 24 CONSECUTIVE HOURS, CONTINUOUS POSITIVE AIRWAY PRESSURE: ICD-10-PCS | Performed by: INTERNAL MEDICINE

## 2023-12-11 PROCEDURE — A4216 STERILE WATER/SALINE, 10 ML: HCPCS | Performed by: INTERNAL MEDICINE

## 2023-12-11 PROCEDURE — 84100 ASSAY OF PHOSPHORUS: CPT

## 2023-12-11 PROCEDURE — 83735 ASSAY OF MAGNESIUM: CPT

## 2023-12-11 PROCEDURE — 80053 COMPREHEN METABOLIC PANEL: CPT

## 2023-12-11 PROCEDURE — 2700000000 HC OXYGEN THERAPY PER DAY

## 2023-12-11 PROCEDURE — 99222 1ST HOSP IP/OBS MODERATE 55: CPT | Performed by: PSYCHIATRY & NEUROLOGY

## 2023-12-11 PROCEDURE — 82248 BILIRUBIN DIRECT: CPT

## 2023-12-11 PROCEDURE — 2500000003 HC RX 250 WO HCPCS: Performed by: STUDENT IN AN ORGANIZED HEALTH CARE EDUCATION/TRAINING PROGRAM

## 2023-12-11 PROCEDURE — 6360000002 HC RX W HCPCS: Performed by: STUDENT IN AN ORGANIZED HEALTH CARE EDUCATION/TRAINING PROGRAM

## 2023-12-11 PROCEDURE — 6370000000 HC RX 637 (ALT 250 FOR IP): Performed by: INTERNAL MEDICINE

## 2023-12-11 PROCEDURE — C9113 INJ PANTOPRAZOLE SODIUM, VIA: HCPCS | Performed by: INTERNAL MEDICINE

## 2023-12-11 PROCEDURE — 94660 CPAP INITIATION&MGMT: CPT

## 2023-12-11 PROCEDURE — 2000000000 HC ICU R&B

## 2023-12-11 PROCEDURE — 6370000000 HC RX 637 (ALT 250 FOR IP): Performed by: STUDENT IN AN ORGANIZED HEALTH CARE EDUCATION/TRAINING PROGRAM

## 2023-12-11 PROCEDURE — 37799 UNLISTED PX VASCULAR SURGERY: CPT

## 2023-12-11 PROCEDURE — 84484 ASSAY OF TROPONIN QUANT: CPT

## 2023-12-11 PROCEDURE — 82962 GLUCOSE BLOOD TEST: CPT

## 2023-12-11 PROCEDURE — 93010 ELECTROCARDIOGRAM REPORT: CPT | Performed by: INTERNAL MEDICINE

## 2023-12-11 PROCEDURE — 93005 ELECTROCARDIOGRAM TRACING: CPT | Performed by: INTERNAL MEDICINE

## 2023-12-11 PROCEDURE — 71045 X-RAY EXAM CHEST 1 VIEW: CPT

## 2023-12-11 PROCEDURE — 85610 PROTHROMBIN TIME: CPT

## 2023-12-11 PROCEDURE — 93306 TTE W/DOPPLER COMPLETE: CPT

## 2023-12-11 PROCEDURE — 93306 TTE W/DOPPLER COMPLETE: CPT | Performed by: INTERNAL MEDICINE

## 2023-12-11 PROCEDURE — 6360000002 HC RX W HCPCS: Performed by: INTERNAL MEDICINE

## 2023-12-11 PROCEDURE — 85730 THROMBOPLASTIN TIME PARTIAL: CPT

## 2023-12-11 RX ORDER — INSULIN LISPRO 100 [IU]/ML
0-4 INJECTION, SOLUTION INTRAVENOUS; SUBCUTANEOUS NIGHTLY
Status: DISCONTINUED | OUTPATIENT
Start: 2023-12-11 | End: 2023-12-11

## 2023-12-11 RX ORDER — INSULIN LISPRO 100 [IU]/ML
0-4 INJECTION, SOLUTION INTRAVENOUS; SUBCUTANEOUS
Status: DISCONTINUED | OUTPATIENT
Start: 2023-12-11 | End: 2023-12-11

## 2023-12-11 RX ORDER — THIAMINE HYDROCHLORIDE 100 MG/ML
100 INJECTION, SOLUTION INTRAMUSCULAR; INTRAVENOUS DAILY
Status: DISCONTINUED | OUTPATIENT
Start: 2023-12-11 | End: 2023-12-14 | Stop reason: HOSPADM

## 2023-12-11 RX ORDER — INSULIN LISPRO 100 [IU]/ML
0-8 INJECTION, SOLUTION INTRAVENOUS; SUBCUTANEOUS
Status: DISCONTINUED | OUTPATIENT
Start: 2023-12-12 | End: 2023-12-12

## 2023-12-11 RX ORDER — FOLIC ACID 5 MG/ML
1 INJECTION, SOLUTION INTRAMUSCULAR; INTRAVENOUS; SUBCUTANEOUS DAILY
Status: DISCONTINUED | OUTPATIENT
Start: 2023-12-11 | End: 2023-12-14 | Stop reason: HOSPADM

## 2023-12-11 RX ORDER — INSULIN LISPRO 100 [IU]/ML
0-4 INJECTION, SOLUTION INTRAVENOUS; SUBCUTANEOUS NIGHTLY
Status: DISCONTINUED | OUTPATIENT
Start: 2023-12-11 | End: 2023-12-12

## 2023-12-11 RX ADMIN — ERTAPENEM SODIUM 1000 MG: 1 INJECTION INTRAMUSCULAR; INTRAVENOUS at 15:19

## 2023-12-11 RX ADMIN — PANTOPRAZOLE SODIUM 40 MG: 40 INJECTION, POWDER, FOR SOLUTION INTRAVENOUS at 08:15

## 2023-12-11 RX ADMIN — HYDRALAZINE HYDROCHLORIDE 50 MG: 50 TABLET, FILM COATED ORAL at 21:00

## 2023-12-11 RX ADMIN — AMPICILLIN SODIUM 2000 MG: 2 INJECTION, POWDER, FOR SOLUTION INTRAMUSCULAR; INTRAVENOUS at 05:16

## 2023-12-11 RX ADMIN — CLONIDINE HYDROCHLORIDE 0.3 MG: 0.2 TABLET ORAL at 08:12

## 2023-12-11 RX ADMIN — ATORVASTATIN CALCIUM 10 MG: 10 TABLET, FILM COATED ORAL at 08:12

## 2023-12-11 RX ADMIN — CEFTRIAXONE SODIUM 2000 MG: 2 INJECTION, POWDER, FOR SOLUTION INTRAMUSCULAR; INTRAVENOUS at 08:30

## 2023-12-11 RX ADMIN — SENNOSIDES 10 ML: 8.8 LIQUID ORAL at 21:00

## 2023-12-11 RX ADMIN — ARFORMOTEROL TARTRATE 15 MCG: 15 SOLUTION RESPIRATORY (INHALATION) at 20:10

## 2023-12-11 RX ADMIN — NYSTATIN 500000 UNITS: 100000 SUSPENSION ORAL at 21:01

## 2023-12-11 RX ADMIN — SODIUM CHLORIDE, PRESERVATIVE FREE 10 ML: 5 INJECTION INTRAVENOUS at 21:11

## 2023-12-11 RX ADMIN — ARFORMOTEROL TARTRATE 15 MCG: 15 SOLUTION RESPIRATORY (INHALATION) at 07:41

## 2023-12-11 RX ADMIN — CLONIDINE HYDROCHLORIDE 0.3 MG: 0.2 TABLET ORAL at 21:00

## 2023-12-11 RX ADMIN — THIAMINE HYDROCHLORIDE 100 MG: 100 INJECTION, SOLUTION INTRAMUSCULAR; INTRAVENOUS at 08:13

## 2023-12-11 RX ADMIN — SACUBITRIL AND VALSARTAN 1 TABLET: 97; 103 TABLET, FILM COATED ORAL at 10:02

## 2023-12-11 RX ADMIN — IPRATROPIUM BROMIDE AND ALBUTEROL SULFATE 1 DOSE: 2.5; .5 SOLUTION RESPIRATORY (INHALATION) at 10:54

## 2023-12-11 RX ADMIN — NYSTATIN 500000 UNITS: 100000 SUSPENSION ORAL at 14:41

## 2023-12-11 RX ADMIN — DEXAMETHASONE SODIUM PHOSPHATE 10 MG: 10 INJECTION, SOLUTION INTRAMUSCULAR; INTRAVENOUS at 04:00

## 2023-12-11 RX ADMIN — IPRATROPIUM BROMIDE AND ALBUTEROL SULFATE 1 DOSE: 2.5; .5 SOLUTION RESPIRATORY (INHALATION) at 07:41

## 2023-12-11 RX ADMIN — DOCUSATE SODIUM 200 MG: 100 CAPSULE, LIQUID FILLED ORAL at 21:00

## 2023-12-11 RX ADMIN — LEVETIRACETAM 750 MG: 100 INJECTION INTRAVENOUS at 12:11

## 2023-12-11 RX ADMIN — ACETAMINOPHEN 650 MG: 325 TABLET ORAL at 21:42

## 2023-12-11 RX ADMIN — HYDRALAZINE HYDROCHLORIDE 50 MG: 50 TABLET, FILM COATED ORAL at 06:00

## 2023-12-11 RX ADMIN — BUDESONIDE INHALATION 500 MCG: 0.5 SUSPENSION RESPIRATORY (INHALATION) at 20:10

## 2023-12-11 RX ADMIN — MICONAZOLE NITRATE: 20.6 POWDER TOPICAL at 08:15

## 2023-12-11 RX ADMIN — ACYCLOVIR SODIUM 650 MG: 50 INJECTION, SOLUTION INTRAVENOUS at 07:28

## 2023-12-11 RX ADMIN — BUDESONIDE INHALATION 500 MCG: 0.5 SUSPENSION RESPIRATORY (INHALATION) at 07:41

## 2023-12-11 RX ADMIN — NYSTATIN 500000 UNITS: 100000 SUSPENSION ORAL at 08:12

## 2023-12-11 RX ADMIN — FOLIC ACID 1 MG: 5 INJECTION, SOLUTION INTRAMUSCULAR; INTRAVENOUS; SUBCUTANEOUS at 08:49

## 2023-12-11 RX ADMIN — ACETAMINOPHEN 650 MG: 325 TABLET ORAL at 06:00

## 2023-12-11 RX ADMIN — SODIUM CHLORIDE, PRESERVATIVE FREE 10 ML: 5 INJECTION INTRAVENOUS at 08:13

## 2023-12-11 RX ADMIN — IPRATROPIUM BROMIDE AND ALBUTEROL SULFATE 1 DOSE: 2.5; .5 SOLUTION RESPIRATORY (INHALATION) at 15:20

## 2023-12-11 RX ADMIN — IPRATROPIUM BROMIDE AND ALBUTEROL SULFATE 1 DOSE: 2.5; .5 SOLUTION RESPIRATORY (INHALATION) at 20:11

## 2023-12-11 RX ADMIN — MICONAZOLE NITRATE: 20.6 POWDER TOPICAL at 21:10

## 2023-12-11 RX ADMIN — SODIUM CHLORIDE, PRESERVATIVE FREE 10 ML: 5 INJECTION INTRAVENOUS at 08:17

## 2023-12-11 RX ADMIN — INSULIN LISPRO 1 UNITS: 100 INJECTION, SOLUTION INTRAVENOUS; SUBCUTANEOUS at 12:34

## 2023-12-11 RX ADMIN — AMPICILLIN SODIUM 2000 MG: 2 INJECTION, POWDER, FOR SOLUTION INTRAMUSCULAR; INTRAVENOUS at 12:11

## 2023-12-11 RX ADMIN — CARVEDILOL 25 MG: 6.25 TABLET, FILM COATED ORAL at 08:12

## 2023-12-11 RX ADMIN — HYDRALAZINE HYDROCHLORIDE 50 MG: 50 TABLET, FILM COATED ORAL at 14:42

## 2023-12-11 RX ADMIN — AMPICILLIN SODIUM 2000 MG: 2 INJECTION, POWDER, FOR SOLUTION INTRAMUSCULAR; INTRAVENOUS at 08:53

## 2023-12-11 RX ADMIN — NYSTATIN 500000 UNITS: 100000 SUSPENSION ORAL at 17:24

## 2023-12-11 RX ADMIN — LEVETIRACETAM 750 MG: 100 INJECTION INTRAVENOUS at 00:39

## 2023-12-11 RX ADMIN — DEXAMETHASONE SODIUM PHOSPHATE 10 MG: 10 INJECTION, SOLUTION INTRAMUSCULAR; INTRAVENOUS at 08:13

## 2023-12-11 RX ADMIN — CLONIDINE HYDROCHLORIDE 0.3 MG: 0.2 TABLET ORAL at 14:42

## 2023-12-11 RX ADMIN — LEVETIRACETAM 750 MG: 100 INJECTION INTRAVENOUS at 23:35

## 2023-12-11 RX ADMIN — SACUBITRIL AND VALSARTAN 1 TABLET: 97; 103 TABLET, FILM COATED ORAL at 21:03

## 2023-12-11 RX ADMIN — INSULIN LISPRO 1 UNITS: 100 INJECTION, SOLUTION INTRAVENOUS; SUBCUTANEOUS at 17:23

## 2023-12-11 ASSESSMENT — PAIN DESCRIPTION - LOCATION
LOCATION: ABDOMEN
LOCATION: SHOULDER

## 2023-12-11 ASSESSMENT — PAIN SCALES - GENERAL
PAINLEVEL_OUTOF10: 0
PAINLEVEL_OUTOF10: 3
PAINLEVEL_OUTOF10: 0
PAINLEVEL_OUTOF10: 3
PAINLEVEL_OUTOF10: 0
PAINLEVEL_OUTOF10: 0
PAINLEVEL_OUTOF10: 3
PAINLEVEL_OUTOF10: 2
PAINLEVEL_OUTOF10: 0
PAINLEVEL_OUTOF10: 0

## 2023-12-11 ASSESSMENT — PAIN SCALES - WONG BAKER: WONGBAKER_NUMERICALRESPONSE: 0

## 2023-12-11 ASSESSMENT — PAIN - FUNCTIONAL ASSESSMENT: PAIN_FUNCTIONAL_ASSESSMENT: ACTIVITIES ARE NOT PREVENTED

## 2023-12-11 ASSESSMENT — PAIN DESCRIPTION - ONSET: ONSET: AWAKENED FROM SLEEP

## 2023-12-11 ASSESSMENT — PAIN DESCRIPTION - ORIENTATION: ORIENTATION: RIGHT

## 2023-12-11 ASSESSMENT — PAIN DESCRIPTION - PAIN TYPE: TYPE: OTHER (COMMENT)

## 2023-12-11 ASSESSMENT — PAIN DESCRIPTION - DESCRIPTORS: DESCRIPTORS: PATIENT UNABLE TO DESCRIBE

## 2023-12-11 ASSESSMENT — PAIN DESCRIPTION - FREQUENCY: FREQUENCY: INTERMITTENT

## 2023-12-11 NOTE — CONSULTS
by mouth nightly    Miranda Raya MD   traZODone (DESYREL) 100 MG tablet Take 1 tablet by mouth nightly    Miranda Raya MD   vitamin D (CHOLECALCIFEROL) 25 MCG (1000 UT) TABS tablet Take 1 tablet by mouth daily    Miranda Raya MD   docusate sodium (COLACE) 100 MG capsule Take 2 capsules by mouth at bedtime 3/11/21   Miranda Raya MD       Allergies:  Latex, Bee venom, Dilaudid [hydromorphone hcl], Dye [iodides], Keflex [cephalexin], Acetaminophen, Bee pollen, Lasix [furosemide], Levaquin [levofloxacin in d5w], Lipitor, Lyrica [pregabalin], Morphine, Naproxen, Nefazodone, Norvasc [amlodipine], Oxycodone-acetaminophen, Shellfish-derived products, and Trazodone and nefazodone    Social History:   TOBACCO:   reports that she has been smoking cigarettes. She started smoking about 49 years ago. She has a 20.00 pack-year smoking history. She has never used smokeless tobacco.  ETOH:   reports that she does not currently use alcohol. Family History:       Problem Relation Age of Onset    Colon Cancer Mother     Cancer Mother     Asthma Father     Colon Cancer Brother     Breast Cancer Maternal Aunt 76       REVIEW OF SYSTEMS:  Review of Systems   Constitutional:  Negative for activity change, chills, fatigue and fever. HENT:  Negative for postnasal drip, sneezing, sore throat, tinnitus and voice change. Eyes:  Negative for photophobia, pain and visual disturbance. Respiratory:  Negative for apnea, cough, choking, chest tightness, shortness of breath, wheezing and stridor. Cardiovascular:  Negative for chest pain, palpitations and leg swelling. Gastrointestinal:  Negative for abdominal distention, abdominal pain, blood in stool and vomiting. Genitourinary:  Positive for dysuria. Negative for difficulty urinating, hematuria and urgency. Musculoskeletal:  Negative for back pain, gait problem, joint swelling, myalgias, neck pain and neck stiffness.    Skin:  Negative for rash abnormality of the visualized skull or soft tissues. No acute intracranial abnormality. CT PELVIS WO CONTRAST Additional Contrast? None    Result Date: 12/5/2023  EXAMINATION: CT OF THE PELVIS WITHOUT CONTRAST 12/5/2023 9:38 pm TECHNIQUE: CT of the pelvis was performed without the administration of intravenous contrast.  Multiplanar reformatted images are provided for review. Adjustment of mA and/or kV according to patient size was utilized. Automated exposure control, iterative reconstruction, and/or weight based adjustment of the mA/kV was utilized to reduce the radiation dose to as low as reasonably achievable. COMPARISON: 03/11/2023 HISTORY ORDERING SYSTEM PROVIDED HISTORY: R hip pain TECHNOLOGIST PROVIDED HISTORY: Reason for exam:->R hip pain Additional Contrast?->None Decision Support Exception - unselect if not a suspected or confirmed emergency medical condition->Emergency Medical Condition (MA) FINDINGS: Bones: No evidence of acute fracture or dislocation. No aggressive appearing osseous abnormality or periostitis. Soft Tissue: No significant soft tissue edema or fluid collections. Joint: No significant degenerative changes. No osseous erosions. No acute osseous abnormality. CT CHEST WO CONTRAST    Result Date: 12/5/2023  EXAMINATION: CT OF THE CHEST WITHOUT CONTRAST 12/5/2023 9:38 pm TECHNIQUE: CT of the chest was performed without the administration of intravenous contrast. Multiplanar reformatted images are provided for review. Automated exposure control, iterative reconstruction, and/or weight based adjustment of the mA/kV was utilized to reduce the radiation dose to as low as reasonably achievable.  COMPARISON: 05/27/2023 HISTORY: ORDERING SYSTEM PROVIDED HISTORY: R chest wall pain; fall TECHNOLOGIST PROVIDED HISTORY: Reason for exam:->R chest wall pain; fall Decision Support Exception - unselect if not a suspected or confirmed emergency medical condition->Emergency Medical Condition pneumothorax. 1. There is no acute compression fracture or subluxation of the cervical spine. 2. Mild multilevel degenerative disc and degenerative joint disease. CT HEAD WO CONTRAST    Result Date: 12/5/2023  EXAMINATION: CT OF THE HEAD WITHOUT CONTRAST  12/5/2023 5:51 pm TECHNIQUE: CT of the head was performed without the administration of intravenous contrast. Automated exposure control, iterative reconstruction, and/or weight based adjustment of the mA/kV was utilized to reduce the radiation dose to as low as reasonably achievable. COMPARISON: 05/27/2023 HISTORY: ORDERING SYSTEM PROVIDED HISTORY: fall TECHNOLOGIST PROVIDED HISTORY: Reason for exam:->fall Has a \"code stroke\" or \"stroke alert\" been called? ->No Decision Support Exception - unselect if not a suspected or confirmed emergency medical condition->Emergency Medical Condition (MA) FINDINGS: BRAIN/VENTRICLES: There is no acute intracranial hemorrhage, mass effect or midline shift. No abnormal extra-axial fluid collection. The gray-white differentiation is maintained without evidence of an acute infarct. There is no evidence of hydrocephalus. ORBITS: The visualized portion of the orbits demonstrate no acute abnormality. SINUSES: The visualized paranasal sinuses and mastoid air cells demonstrate no acute abnormality. SOFT TISSUES/SKULL:  No acute abnormality of the visualized skull or soft tissues. No acute intracranial abnormality. EKG: sinus tachycardia, RBBB, unchanged from previous tracings. Resident's Assessment and Plan     Augusta Torres is a 75 y.o. female    Neurology  Acute encephalopathy 2/2 hypertensive emergency vs possible seizure vs meningitis  Patient was initially admitted with hx of fall in board man. He had E coli UTI septicemia in urine cultures. RRT was called on 12/07 when she became altered, MRI and CT head showed no acute abnormality. SBP was >250 and patient was placed on Cardene drip.  She has questionable

## 2023-12-11 NOTE — H&P
Hospitalist History & Physical      PCP: Jovana Antony APRN - CNP    Date of Admission: 12/10/2023    Date of Service: Pt seen/examined on 12/10/2023 and is admitted to Inpatient with expected LOS greater than two midnights due to medical therapy. Chief Complaint:  had no chief complaint listed for this encounter. History Of Present Illness:    Ms. Brenda Fatima, a 75y.o. year old female  who  has a past medical history of Asthma, Atherosclerosis of native artery of left leg with rest pain ([address]), CAD (coronary artery disease), Cellulitis and abscess of trunk, Cerebellar infarct (HCC), Chronic back pain, Chronic kidney disease, Chronic systolic congestive heart failure ([address]), Chronic venous insufficiency, COPD (chronic obstructive pulmonary disease) ([address]), COVID-19, Depression, Diabetes mellitus ([address]), Diabetic neuropathy ([address]), Diverticulosis, Fatty liver, Glaucoma, open angle, Hepatic encephalopathy ([address]), Hiatal hernia, History of blood transfusion, Hyperlipidemia, Hyperplastic colon polyp, Hypertension, Incontinence, Liver mass, Morbid obesity ([address]), Movement disorder, Myocardial infarction ([address]), O2 dependent, Osteoarthritis, generalized, Pain in both lower legs, Peripheral vascular disease ([address]), Pneumonia, Pulmonary edema, PVD (peripheral vascular disease) with claudication ([address]), Sleep apnea, Status post peripheral artery angioplasty, Tobacco abuse, Tobacco abuse, Tobacco dependence, Tubular adenoma polyp of rectum, Urinary tract infection due to ESBL Klebsiella, and Ventral hernia. Patient [facility] from [facility] for altered mental status. Patient was admitted with UTI. Patient has been on Rocephin. Had been doing well then became significantly hypertensive and started on a cardene drip. At that point, she had worsening confusion. CTH negative. CT cervical spine showed acute compression fracture or subluxation of the cervical spine. CT chest showed no acute displaced rib fractures. The gray-white differentiation is maintained without evidence of an acute infarct. There is no evidence of hydrocephalus. ORBITS: The visualized portion of the orbits demonstrate no acute abnormality. SINUSES: The visualized paranasal sinuses and mastoid air cells demonstrate no acute abnormality. SOFT TISSUES/SKULL:  No acute abnormality of the visualized skull or soft tissues. No acute intracranial abnormality. ASSESSMENT:    Acute Metabolic Encephalopathy  HTN Emergency  UTI-E Coli   Fatty Liver Disease   CHF with improved EF   Chronic Hypoxic Respiratory Failure     Plan  Cardene drip   Rocephin   Supplemental 02   Neurology evaluation   HTN medications and wean drip as able   Speech evaluation       Management as per MICU and consultants     I discussed case with the transferring physician. Records reviewed. Diet: Diet NPO  Code Status: Full Code    Surrogate decision maker confirmed with patient:  Primary Emergency Contact: Madeline Gossabbie, Home Phone: [phone]    DVT Prophylaxis: [x]Lovenox []Heparin []PCD [] [address] []Encouraged ambulation    Disposition: []Med/Surg [] Intermediate [x] ICU/CCU  Admit status: [] Observation [x] Inpatient     +++++++++++++++++++++++++++++++++++++++++++++++++  Kelin Shaw DO  [place], [place]  +++++++++++++++++++++++++++++++++++++++++++++++++  NOTE: This report was transcribed using voice recognition software. Every effort was made to ensure accuracy; however, inadvertent computerized transcription errors may be present.

## 2023-12-11 NOTE — PLAN OF CARE
Problem: Safety - Adult  Goal: Free from fall injury  12/11/2023 0939 by Gracy Calderon RN  Outcome: Progressing  Flowsheets (Taken 12/11/2023 0800)  Free From Fall Injury: Instruct family/caregiver on patient safety     Problem: Chronic Conditions and Co-morbidities  Goal: Patient's chronic conditions and co-morbidity symptoms are monitored and maintained or improved  12/11/2023 0939 by Gracy Calderon RN  Outcome: Progressing  Flowsheets (Taken 12/11/2023 0800)  Care Plan - Patient's Chronic Conditions and Co-Morbidity Symptoms are Monitored and Maintained or Improved: Monitor and assess patient's chronic conditions and comorbid symptoms for stability, deterioration, or improvement     Problem: Discharge Planning  Goal: Discharge to home or other facility with appropriate resources  12/11/2023 0939 by Gracy Calderon RN  Outcome: Progressing  Flowsheets (Taken 12/11/2023 0800)  Discharge to home or other facility with appropriate resources: Identify barriers to discharge with patient and caregiver     Problem: Pain  Goal: Verbalizes/displays adequate comfort level or baseline comfort level  12/11/2023 0939 by Gracy Calderon RN  Outcome: Progressing  Flowsheets (Taken 12/11/2023 0800)  Verbalizes/displays adequate comfort level or baseline comfort level: Encourage patient to monitor pain and request assistance     Problem: Skin/Tissue Integrity  Goal: Absence of new skin breakdown  Description: 1. Monitor for areas of redness and/or skin breakdown  2. Assess vascular access sites hourly  3. Every 4-6 hours minimum:  Change oxygen saturation probe site  4. Every 4-6 hours:  If on nasal continuous positive airway pressure, respiratory therapy assess nares and determine need for appliance change or resting period.   12/11/2023 0939 by Gracy Calderon RN  Outcome: Progressing     Problem: ABCDS Injury Assessment  Goal: Absence of physical injury  12/11/2023 0939 by Nicanor Guzman Debbie Garcia RN  Outcome: Progressing  Flowsheets (Taken 12/11/2023 0800)  Absence of Physical Injury: Implement safety measures based on patient assessment     Problem: Confusion  Goal: Confusion, delirium, dementia, or psychosis is improved or at baseline  Description: INTERVENTIONS:  1. Assess for possible contributors to thought disturbance, including medications, impaired vision or hearing, underlying metabolic abnormalities, dehydration, psychiatric diagnoses, and notify attending LIP  2. [place] high risk fall precautions, as indicated  3. Provide frequent short contacts to provide reality reorientation, refocusing and direction  4. Decrease environmental stimuli, including noise as appropriate  5. Monitor and intervene to maintain adequate nutrition, hydration, elimination, sleep and activity  6. If unable to ensure safety without constant attention obtain sitter and review sitter guidelines with assigned personnel  7. Initiate Psychosocial CNS and Spiritual Care consult, as indicated  12/11/2023 0939 by Shagufta Trujillo RN  Outcome: Progressing  Flowsheets (Taken 12/11/2023 0800)  Effect of thought disturbance (confusion, delirium, dementia, or psychosis) are managed with adequate functional status: Assess for contributors to thought disturbance, including medications, impaired vision or hearing, underlying metabolic abnormalities, dehydration, psychiatric diagnoses, notify LIP     Problem: Discharge Planning  Goal: Discharge to home or other facility with appropriate resources  12/11/2023 0939 by Shagufta Trujillo RN  Outcome: Progressing  Flowsheets (Taken 12/11/2023 0800)  Discharge to home or other facility with appropriate resources: Identify barriers to discharge with patient and caregiver  12/10/2023 9506 by Giancarlo Fischer RN  Outcome: Not Progressing     Problem: Confusion  Goal: Confusion, delirium, dementia, or psychosis is improved or at baseline  Description: INTERVENTIONS:  1.  Assess for possible contributors to thought disturbance, including medications, impaired vision or hearing, underlying metabolic abnormalities, dehydration, psychiatric diagnoses, and notify attending LIP  2. [place] high risk fall precautions, as indicated  3. Provide frequent short contacts to provide reality reorientation, refocusing and direction  4. Decrease environmental stimuli, including noise as appropriate  5. Monitor and intervene to maintain adequate nutrition, hydration, elimination, sleep and activity  6. If unable to ensure safety without constant attention obtain sitter and review sitter guidelines with assigned personnel  7.  Initiate Psychosocial CNS and Spiritual Care consult, as indicated  12/11/2023 0939 by Sima Fletcher RN  Outcome: Progressing  Flowsheets (Taken 12/11/2023 0800)  Effect of thought disturbance (confusion, delirium, dementia, or psychosis) are managed with adequate functional status: Assess for contributors to thought disturbance, including medications, impaired vision or hearing, underlying metabolic abnormalities, dehydration, psychiatric diagnoses, notify LIP  12/10/2023 [id number] by Candis Garcia RN  Outcome: Not Progressing

## 2023-12-11 NOTE — CARE COORDINATION
12/11/23 Transition of care:  Pt was transfer from [place] [facility] for neurology evaluation pt admitted for UTI and fall became agitated and hypertensive is on Cardene drip pt has NG. Met with pt to discuss transition of care. Pt lives with grandson and caregiver through [employer] [facility] who is in the home M-F [id number]. Pt states she has a cane and rollator at home Does have trilogy, nebulizer and home O2 which she states she wears for naps and hs through [address] Referral was made by [facility] to [facility]. Pt is agreeable to [facility] but not to [facility] previous [facility] score of 17/24 (12/6) indicated plan was to return home with [facility] (order will need placed once needs are established) PCP is Jill Caro is [facility] [address]. Plan is home with [facility] with daughter to transport CM to follow Electronically signed by Lolita Ribera RN  on 12/11/2023 at 2:54 PM       Case Management Assessment  Initial Evaluation    Date/Time of Evaluation: 12/11/2023 2:55 PM  Assessment Completed by: Lolita Ribera RN    If patient is discharged prior to next notation, then this note serves as note for discharge by case management. Patient Name: Ngoc Rivera                   YOB: 1955  Diagnosis: Hypertensive emergency [I16.1]                   Date / Time: 12/10/2023  5:59 PM    Patient Admission Status: Inpatient   Readmission Risk (Low < 19, Mod (19-27), High > 27): Readmission Risk Score: 20.4    Current PCP: PATRIZIA Benson CNP  PCP verified by CM? Chart Reviewed: Yes      History Provided by:    Patient Orientation:      Patient Cognition:      Hospitalization in the last 30 days (Readmission):  No    If yes, Readmission Assessment in  Navigator will be completed.     Advance Directives:      Code Status: Full Code   Patient's Primary Decision Maker is:      Primary Decision MakerMorrell Mychal - Child - [phone]    Secondary Decision Maker: Kd Nicolas - Brother/Sister - Department  Ph: [phone] Fax: na

## 2023-12-11 NOTE — PLAN OF CARE
Problem: Safety - Medical Restraint  Goal: Remains free of injury from restraints (Restraint for Interference with Medical Device)  12/10/2023 1857 by Elgin Truong RN  Outcome: Completed  12/10/2023 1856 by Elgin Truong RN  Outcome: Progressing     Problem: Safety - Adult  Goal: Free from fall injury  12/10/2023 2357 by Gerhard Marrufo RN  Outcome: Progressing  12/10/2023 1856 by Elgin Truong RN  Outcome: Progressing  Flowsheets (Taken 12/10/2023 1800)  Free From Fall Injury: Instruct family/caregiver on patient safety     Problem: Chronic Conditions and Co-morbidities  Goal: Patient's chronic conditions and co-morbidity symptoms are monitored and maintained or improved  12/10/2023 2357 by Gerhard Marrufo RN  Outcome: Progressing  12/10/2023 1856 by Elgin Truong RN  Outcome: Progressing  Flowsheets (Taken 12/10/2023 1800)  Care Plan - Patient's Chronic Conditions and Co-Morbidity Symptoms are Monitored and Maintained or Improved: Monitor and assess patient's chronic conditions and comorbid symptoms for stability, deterioration, or improvement     Problem: Discharge Planning  Goal: Discharge to home or other facility with appropriate resources  12/10/2023 2357 by Gerhard Marrufo RN  Outcome: Not Progressing  12/10/2023 1856 by Elgin Truong RN  Outcome: Progressing  Flowsheets (Taken 12/10/2023 1800)  Discharge to home or other facility with appropriate resources: Identify barriers to discharge with patient and caregiver     Problem: Pain  Goal: Verbalizes/displays adequate comfort level or baseline comfort level  12/10/2023 2357 by Gerhard Marrufo RN  Outcome: Progressing  12/10/2023 1856 by Elgin Truong RN  Outcome: Progressing  Flowsheets (Taken 12/10/2023 1800)  Verbalizes/displays adequate comfort level or baseline comfort level: Encourage patient to monitor pain and request assistance     Problem: Skin/Tissue Integrity  Goal: Absence of new skin breakdown  12/10/2023 2357 by Fred Cerrato RN  Outcome: Progressing  12/10/2023 1856 by Emmy Fritz RN  Outcome: Progressing     Problem: ABCDS Injury Assessment  Goal: Absence of physical injury  12/10/2023 2357 by Fred Cerrato RN  Outcome: Progressing  12/10/2023 1856 by Emmy Fritz RN  Outcome: Progressing  Flowsheets (Taken 12/10/2023 1800)  Absence of Physical Injury: Implement safety measures based on patient assessment     Problem: Confusion  Goal: Confusion, delirium, dementia, or psychosis is improved or at baseline  Outcome: Not Progressing     Problem: Discharge Planning  Goal: Discharge to home or other facility with appropriate resources  12/10/2023 2357 by Fred Cerrato RN  Outcome: Not Progressing  12/10/2023 1856 by Emmy Firtz RN  Outcome: Progressing  Flowsheets (Taken 12/10/2023 1800)  Discharge to home or other facility with appropriate resources: Identify barriers to discharge with patient and caregiver     Problem: Confusion  Goal: Confusion, delirium, dementia, or psychosis is improved or at baseline  Outcome: Not Progressing

## 2023-12-11 NOTE — PROGRESS NOTES
Pharmacy Consultation Note  (Antibiotic Dosing and Monitoring)    Initial consult date: 12-10-23  Consulting physician/provider: Dr Shanel Art  Drug: Vancomycin  Indication: CNS infection, possible menningitis    Age/  Gender Height Weight IBW  Allergy Information   67 y.o./female 157.5 cm (5' 2\") 83 kg (182 lb 15.7 oz)     Ideal body weight: 50.1 kg (110 lb 7.2 oz)  Adjusted ideal body weight: 63.3 kg (139 lb 7.4 oz)   Latex, Bee venom, Dilaudid [hydromorphone hcl], Dye [iodides], Keflex [cephalexin], Acetaminophen, Bee pollen, Lasix [furosemide], Levaquin [levofloxacin in d5w], Lipitor, Lyrica [pregabalin], Morphine, Naproxen, Nefazodone, Norvasc [amlodipine], Oxycodone-acetaminophen, Shellfish-derived products, and Trazodone and nefazodone      Renal Function:  Recent Labs     12/09/23  0542 12/09/23 2130 12/10/23  0400   BUN 23 22 19   CREATININE 1.1* 1.1* 1.0       Intake/Output Summary (Last 24 hours) at 12/10/2023 2127  Last data filed at 12/10/2023 1800  Gross per 24 hour   Intake 0 ml   Output 200 ml   Net -200 ml       Vancomycin Monitoring:  Trough:  No results for input(s): \"VANCOTROUGH\" in the last 72 hours. Random:  No results for input(s): \"VANCORANDOM\" in the last 72 hours. Vancomycin Administration Times:  Recent vancomycin administrations        No vancomycin IV orders with administrations found. Orders not given:            vancomycin (VANCOCIN) 1,750 mg in sodium chloride 0.9 % 500 mL IVPB                    Assessment:  Patient is a 75 y.o. female who has been initiated on vancomycin  Estimated Creatinine Clearance: 54 mL/min (based on SCr of 1 mg/dL). Plan:   Will continue vancomycin 1750 mg IV once followed by vancomycin 1000 mg IV every 18 hours  Will check vancomycin levels when appropriate  Will continue to monitor renal function   Pharmacy to follow      Colton Marie [facility] 12/10/2023 9:27 PM

## 2023-12-11 NOTE — PROCEDURES
Missing results of molecular panel and blood draw due to specimen lost in transit. This RN unaware it was lost. Specimen located and resent to lab. New blood draw performed.

## 2023-12-11 NOTE — CONSULTS
12/20/12    UPPER GASTROINTESTINAL ENDOSCOPY  12/23/2016    Dr Hanny Ceja GASTROINTESTINAL ENDOSCOPY  02/08/2017     Current Medications:   Scheduled Meds:   nystatin  5 mL Oral 4x Daily    thiamine  100 mg IntraVENous Daily    folic acid  1 mg IntraVENous Daily    sacubitril-valsartan  1 tablet Oral BID    ertapenem (INVanz) IVPB  1,000 mg IntraVENous Q24H    [Held by provider] aspirin  81 mg Oral Daily    [Held by provider] baclofen  10 mg Oral BID    cetirizine  10 mg Oral Daily    docusate sodium  200 mg Oral Nightly    [Held by provider] DULoxetine  60 mg Oral Daily    fluticasone  2 spray Each Nostril Daily    [Held by provider] gabapentin  100 mg Oral BID    [Held by provider] montelukast  10 mg Oral Nightly    [Held by provider] oxybutynin  10 mg Oral Daily    [Held by provider] traZODone  100 mg Oral Nightly    sodium chloride flush  5-40 mL IntraVENous 2 times per day    budesonide  0.5 mg Nebulization BID RT    And    arformoterol tartrate  15 mcg Nebulization BID RT    [Held by provider] enoxaparin  40 mg SubCUTAneous Daily    lidocaine  1 patch TransDERmal Daily    miconazole   Topical BID    levETIRAcetam  750 mg IntraVENous Q12H    pantoprazole (PROTONIX) 40 mg in sodium chloride (PF) 0.9 % 10 mL injection  40 mg IntraVENous Daily    insulin lispro  0-4 Units SubCUTAneous Q6H    cloNIDine  0.3 mg Oral TID    hydrALAZINE  50 mg Oral 3 times per day    atorvastatin  10 mg Oral Daily    ipratropium 0.5 mg-albuterol 2.5 mg  1 Dose Inhalation Q4H WA RT    carvedilol  25 mg Oral BID     sennosides  10 mL Per NG tube Nightly     Continuous Infusions:   sodium chloride 10 mL/hr at 12/10/23 2230    dextrose       PRN Meds:sodium chloride flush, sodium chloride, ondansetron **OR** ondansetron, polyethylene glycol, acetaminophen **OR** acetaminophen, dextrose bolus **OR** dextrose bolus, glucagon (rDNA), dextrose, hydrALAZINE, diphenhydrAMINE, LORazepam, medicated lip balm, glucose    Allergies:  Latex, Bee physicians.       Electronically signed by Louis Cole MD on 12/11/2023 at 2:31 PM

## 2023-12-11 NOTE — CONSULTS
[facility]  Neurology Consult    Date:  12/11/2023  Patient Name:  Kaylie Aragon  YOB: 1955  MRN: [id number]     PCP:  PATRIZIA Stein - CNP   Referring:  Lion Walker MD      Chief Complaint: altered mental status    History obtained from: chart, staff    Danny Morris is a 75 y.o. female presenting with altered mental status in the setting of hypertensive emergency superimposed on UTI and ALEC at outside hospital. Her presentation appears most consistent with delirium, but given the prolonged course further evaluation for secondary causes is reasonable. Plan  Agree with LP to rule out infectious etiology  Repeat EEG  Continue Keppra 750 BID for now - if repeat EEG unremarkable would wean off        History of Present Illness:  Kaylie Aragon is a 75 y.o. right handed female presenting for evaluation of altered mental status. Patient was admitted to [facility] on 12/5/23 following a fall with generalized weakness. While there she was noted to become hypertensive (max of 213/90 noted) with AMS. MRI brain and EEG (some possible GPDs noted on independent review, could have been related to metabolic abnormalities at that point) did not provide definitive diagnosis and patient was transferred to [facility] for further evaluation. She was initially noted to have UTI an ALEC at [facility] as well. TSH, B12, and folate normal last week. The patient remains somnolent and confused at the time of this evaluation.     Medical History:   Past Medical History:   Diagnosis Date    Asthma     Atherosclerosis of native artery of left leg with rest pain ([address]) 11/09/2018    CAD (coronary artery disease) 2011    Cellulitis and abscess of trunk 04/14/2015    Cerebellar infarct ([address]) 04/03/2019    Remote, rt. cerebellum, head CT scan, 1/9/19    Chronic back pain     Chronic kidney disease     Chronic systolic congestive heart failure ([address]) 10/04/2013    Chronic venous insufficiency 10/11/2017    COPD (chronic obstructive pulmonary disease) ([address])     COVID-19     Depression     Diabetes mellitus ([address])     Diabetic neuropathy ([address])     Diverticulosis     Fatty liver 01/08/2016    per US    Glaucoma, open angle 02/01/2016    Mild-OU    Hepatic encephalopathy ([address]) 02/07/2016    resolved    Hiatal hernia     History of blood transfusion     Hyperlipidemia     Hyperplastic colon polyp     Hypertension     Incontinence     Liver mass     Morbid obesity ([address])     Movement disorder     Myocardial infarction ([address]) 2011    O2 dependent 09/16/2021    with bipap 3 liters    Osteoarthritis, generalized     Pain in both lower legs 10/11/2017    Peripheral vascular disease ([address])     Pneumonia 01/26/2014    Pulmonary edema     resolved    PVD (peripheral vascular disease) with claudication ([address]) 08/30/2017    Sleep apnea     uses BI PAP    Status post peripheral artery angioplasty 10/31/2019    Tobacco abuse     Tobacco abuse 10/11/2017    Tobacco dependence 6/30/2022    Tubular adenoma polyp of rectum     Urinary tract infection due to ESBL Klebsiella 03/08/2017    Ventral hernia         Surgical History:   Past Surgical History:   Procedure Laterality Date    ANGIOPLASTY  11/13/2018    Dr. Frank Cox & athrectomy L SFA & Popliteal    BREAST SURGERY  2000    bilateral reduction    BRONCHIAL BRUSH BIOPSY  1/25/2013    Dr Jovita Walsh  04/20/2015    Dr. Pierre Mensah  12/2011     DR. Pipo Tolbert,  follows Dr Terrence Macias  11/15/2013    Dr Riaz Gray    COLONOSCOPY  12/23/2016    Dr Hortensia Davila adenoma & hyperplastic polyps, melanosis coli (repeat one year 12/2017)    CORONARY ARTERY BYPASS GRAFT      ECHO COMPLETE  10/9/2013         ENDOSCOPY, COLON, DIAGNOSTIC  04/18/2017    GALLBLADDER SURGERY      gallstones removed, [facility] 8/2017    HYSTERECTOMY (CERVIX STATUS UNKNOWN)      JOINT REPLACEMENT  2011    LEFT KNEE    KNEE < > 110*   CO2  --  20*   < > 20*   BUN  --  22   < > 22   CREATININE  --  1.1*   < > 1.0   GLUCOSE  --  168*   < > 187*   CALCIUM  --  8.8   < > 7.7*   PROT  --  6.4   < > 5.9*   LABALBU  --  3.1*   < > 2.7*   BILITOT  --  0.7   < > 0.5   ALKPHOS  --  190*   < > 166*   AST  --  13   < > 8   ALT  --  24   < > 23   WBC  --   --    < > 5.6   RBC  --   --    < > 2.88*   HGB  --   --    < > 9.1*   HCT  --   --    < > 29.3*   MCV  --   --    < > 101.7*   MCH  --   --    < > 31.6   MCHC  --   --    < > 31.1*   RDW  --   --    < > 13.2   PLT  --   --    < > 248   MPV  --   --    < > 10.1   PH 7.420  --   --   --    PO2 72.1*  --   --   --    PCO2 36.1  --   --   --    HCO3 22.9  --   --   --    BE -1.2  --   --   --    O2SAT 94.1  --   --   --    LACTA  --  1.1  --   --     < > = values in this interval not displayed. Imaging  MRI and MRA of the brain    IMPRESSION:  MRI OF THE BRAIN:     1. No acute intracranial abnormality. 2. Small chronic lacunar infarctions in the right cerebellar hemisphere. 3. Mild generalized cerebral volume loss. Minimal chronic microvascular  ischemic changes. MRA OF THE BRAIN:     1. Limited evaluation due to patient motion artifact. 2. Within limits of this study, no definite major arterial stenosis or  occlusion identified. 3. Markedly diminutive right vertebral artery, which may be due to  developmental hypoplasia. Proximal stenosis cannot be excluded. Consider  further evaluation with CTA of the head and neck.         Electronically signed by Kaylin Sewell DO on 12/11/2023 at 11:08 AM

## 2023-12-11 NOTE — PROGRESS NOTES
[facility] Progress Note    SYNOPSIS: Patient admitted on 12/10/2023 for Hypertensive emergency    Ms. Gustavo Joya, a 75y.o. year old female  who  has a past medical history of Asthma, Atherosclerosis of native artery of left leg with rest pain ([address]), CAD (coronary artery disease), Cellulitis and abscess of trunk, Cerebellar infarct (HCC), Chronic back pain, Chronic kidney disease, Chronic systolic congestive heart failure ([address]), Chronic venous insufficiency, COPD (chronic obstructive pulmonary disease) ([address]), COVID-19, Depression, Diabetes mellitus ([address]), Diabetic neuropathy ([address]), Diverticulosis, Fatty liver, Glaucoma, open angle, Hepatic encephalopathy ([address]), Hiatal hernia, History of blood transfusion, Hyperlipidemia, Hyperplastic colon polyp, Hypertension, Incontinence, Liver mass, Morbid obesity ([address]), Movement disorder, Myocardial infarction ([address]), O2 dependent, Osteoarthritis, generalized, Pain in both lower legs, Peripheral vascular disease ([address]), Pneumonia, Pulmonary edema, PVD (peripheral vascular disease) with claudication ([address]), Sleep apnea, Status post peripheral artery angioplasty, Tobacco abuse, Tobacco abuse, Tobacco dependence, Tubular adenoma polyp of rectum, Urinary tract infection due to ESBL Klebsiella, and Ventral hernia. Patient presented from [place] for altered mental status. Patient was admitted with UTI. Patient has been on Rocephin. Had been doing well then became significantly hypertensive and started on a cardene drip. At that point, she had worsening confusion. CTH negative. CT cervical spine showed acute compression fracture or subluxation of the cervical spine. CT chest showed no acute displaced rib fractures. Small right pleural effusion. CTAP showed no osseous abnormality. MRA/MRI showed small chronic lacunar infarctions in the right cerebellar hemisphere. No major arterial stenosis or occlusion.     diminutive right vertebral artery, which may be due

## 2023-12-12 LAB
ALBUMIN SERPL-MCNC: 3 G/DL (ref 3.5–5.2)
ALP SERPL-CCNC: 139 U/L (ref 35–104)
ALT SERPL-CCNC: 19 U/L (ref 0–32)
ANION GAP SERPL CALCULATED.3IONS-SCNC: 11 MMOL/L (ref 7–16)
ANION GAP SERPL CALCULATED.3IONS-SCNC: 18 MMOL/L (ref 7–16)
AST SERPL-CCNC: 7 U/L (ref 0–31)
B-OH-BUTYR SERPL-MCNC: 0.89 MMOL/L (ref 0.02–0.27)
BILIRUB DIRECT SERPL-MCNC: <0.2 MG/DL (ref 0–0.3)
BILIRUB INDIRECT SERPL-MCNC: ABNORMAL MG/DL (ref 0–1)
BILIRUB SERPL-MCNC: 0.3 MG/DL (ref 0–1.2)
BILIRUB UR QL STRIP: NEGATIVE
BUN SERPL-MCNC: 31 MG/DL (ref 6–23)
BUN SERPL-MCNC: 32 MG/DL (ref 6–23)
CALCIUM SERPL-MCNC: 8.4 MG/DL (ref 8.6–10.2)
CALCIUM SERPL-MCNC: 8.5 MG/DL (ref 8.6–10.2)
CHLORIDE SERPL-SCNC: 103 MMOL/L (ref 98–107)
CHLORIDE SERPL-SCNC: 106 MMOL/L (ref 98–107)
CLARITY UR: CLEAR
CO2 SERPL-SCNC: 19 MMOL/L (ref 22–29)
CO2 SERPL-SCNC: 22 MMOL/L (ref 22–29)
COLOR UR: YELLOW
CREAT SERPL-MCNC: 1 MG/DL (ref 0.5–1)
CREAT SERPL-MCNC: 1.2 MG/DL (ref 0.5–1)
EKG ATRIAL RATE: 45 BPM
EKG P AXIS: 54 DEGREES
EKG P-R INTERVAL: 156 MS
EKG Q-T INTERVAL: 652 MS
EKG QRS DURATION: 128 MS
EKG QTC CALCULATION (BAZETT): 563 MS
EKG R AXIS: 17 DEGREES
EKG T AXIS: 54 DEGREES
EKG VENTRICULAR RATE: 45 BPM
ERYTHROCYTE [DISTWIDTH] IN BLOOD BY AUTOMATED COUNT: 13.2 % (ref 11.5–15)
GFR SERPL CREATININE-BSD FRML MDRD: 48 ML/MIN/1.73M2
GFR SERPL CREATININE-BSD FRML MDRD: >60 ML/MIN/1.73M2
GLUCOSE BLD-MCNC: 208 MG/DL (ref 74–99)
GLUCOSE BLD-MCNC: 244 MG/DL (ref 74–99)
GLUCOSE SERPL-MCNC: 237 MG/DL (ref 74–99)
GLUCOSE SERPL-MCNC: 305 MG/DL (ref 74–99)
GLUCOSE UR STRIP-MCNC: NEGATIVE MG/DL
HCT VFR BLD AUTO: 30.6 % (ref 34–48)
HGB BLD-MCNC: 9.8 G/DL (ref 11.5–15.5)
HGB UR QL STRIP.AUTO: NEGATIVE
KETONES UR STRIP-MCNC: 15 MG/DL
LEUKOCYTE ESTERASE UR QL STRIP: NEGATIVE
MAGNESIUM SERPL-MCNC: 2.1 MG/DL (ref 1.6–2.6)
MCH RBC QN AUTO: 31.8 PG (ref 26–35)
MCHC RBC AUTO-ENTMCNC: 32 G/DL (ref 32–34.5)
MCV RBC AUTO: 99.4 FL (ref 80–99.9)
NITRITE UR QL STRIP: NEGATIVE
PH UR STRIP: 6 [PH] (ref 5–9)
PHOSPHATE SERPL-MCNC: 1.9 MG/DL (ref 2.5–4.5)
PLATELET # BLD AUTO: 312 K/UL (ref 130–450)
PMV BLD AUTO: 10.4 FL (ref 7–12)
POTASSIUM SERPL-SCNC: 4.1 MMOL/L (ref 3.5–5)
POTASSIUM SERPL-SCNC: 4.2 MMOL/L (ref 3.5–5)
PROT SERPL-MCNC: 5.9 G/DL (ref 6.4–8.3)
PROT UR STRIP-MCNC: ABNORMAL MG/DL
RBC # BLD AUTO: 3.08 M/UL (ref 3.5–5.5)
RBC #/AREA URNS HPF: ABNORMAL /HPF
SODIUM SERPL-SCNC: 136 MMOL/L (ref 132–146)
SODIUM SERPL-SCNC: 143 MMOL/L (ref 132–146)
SP GR UR STRIP: 1.02 (ref 1–1.03)
TROPONIN I SERPL HS-MCNC: 22 NG/L (ref 0–9)
UROBILINOGEN UR STRIP-ACNC: 0.2 EU/DL (ref 0–1)
WBC #/AREA URNS HPF: ABNORMAL /HPF
WBC OTHER # BLD: 9.5 K/UL (ref 4.5–11.5)

## 2023-12-12 PROCEDURE — 6370000000 HC RX 637 (ALT 250 FOR IP): Performed by: INTERNAL MEDICINE

## 2023-12-12 PROCEDURE — 92526 ORAL FUNCTION THERAPY: CPT

## 2023-12-12 PROCEDURE — 84484 ASSAY OF TROPONIN QUANT: CPT

## 2023-12-12 PROCEDURE — 84100 ASSAY OF PHOSPHORUS: CPT

## 2023-12-12 PROCEDURE — 6370000000 HC RX 637 (ALT 250 FOR IP)

## 2023-12-12 PROCEDURE — 94660 CPAP INITIATION&MGMT: CPT

## 2023-12-12 PROCEDURE — 94640 AIRWAY INHALATION TREATMENT: CPT

## 2023-12-12 PROCEDURE — 82010 KETONE BODYS QUAN: CPT

## 2023-12-12 PROCEDURE — 92610 EVALUATE SWALLOWING FUNCTION: CPT

## 2023-12-12 PROCEDURE — 93005 ELECTROCARDIOGRAM TRACING: CPT | Performed by: STUDENT IN AN ORGANIZED HEALTH CARE EDUCATION/TRAINING PROGRAM

## 2023-12-12 PROCEDURE — 2580000003 HC RX 258: Performed by: INTERNAL MEDICINE

## 2023-12-12 PROCEDURE — 51798 US URINE CAPACITY MEASURE: CPT

## 2023-12-12 PROCEDURE — 6360000002 HC RX W HCPCS: Performed by: INTERNAL MEDICINE

## 2023-12-12 PROCEDURE — 82962 GLUCOSE BLOOD TEST: CPT

## 2023-12-12 PROCEDURE — 80048 BASIC METABOLIC PNL TOTAL CA: CPT

## 2023-12-12 PROCEDURE — 6360000002 HC RX W HCPCS: Performed by: STUDENT IN AN ORGANIZED HEALTH CARE EDUCATION/TRAINING PROGRAM

## 2023-12-12 PROCEDURE — 85027 COMPLETE CBC AUTOMATED: CPT

## 2023-12-12 PROCEDURE — A4216 STERILE WATER/SALINE, 10 ML: HCPCS | Performed by: INTERNAL MEDICINE

## 2023-12-12 PROCEDURE — 2580000003 HC RX 258: Performed by: STUDENT IN AN ORGANIZED HEALTH CARE EDUCATION/TRAINING PROGRAM

## 2023-12-12 PROCEDURE — 6360000002 HC RX W HCPCS

## 2023-12-12 PROCEDURE — 81001 URINALYSIS AUTO W/SCOPE: CPT

## 2023-12-12 PROCEDURE — 82248 BILIRUBIN DIRECT: CPT

## 2023-12-12 PROCEDURE — 2060000000 HC ICU INTERMEDIATE R&B

## 2023-12-12 PROCEDURE — 6370000000 HC RX 637 (ALT 250 FOR IP): Performed by: STUDENT IN AN ORGANIZED HEALTH CARE EDUCATION/TRAINING PROGRAM

## 2023-12-12 PROCEDURE — 93010 ELECTROCARDIOGRAM REPORT: CPT | Performed by: INTERNAL MEDICINE

## 2023-12-12 PROCEDURE — 5A09357 ASSISTANCE WITH RESPIRATORY VENTILATION, LESS THAN 24 CONSECUTIVE HOURS, CONTINUOUS POSITIVE AIRWAY PRESSURE: ICD-10-PCS | Performed by: INTERNAL MEDICINE

## 2023-12-12 PROCEDURE — 80053 COMPREHEN METABOLIC PANEL: CPT

## 2023-12-12 PROCEDURE — 99231 SBSQ HOSP IP/OBS SF/LOW 25: CPT | Performed by: INTERNAL MEDICINE

## 2023-12-12 PROCEDURE — 36415 COLL VENOUS BLD VENIPUNCTURE: CPT

## 2023-12-12 PROCEDURE — C9113 INJ PANTOPRAZOLE SODIUM, VIA: HCPCS | Performed by: INTERNAL MEDICINE

## 2023-12-12 PROCEDURE — 99233 SBSQ HOSP IP/OBS HIGH 50: CPT | Performed by: NURSE PRACTITIONER

## 2023-12-12 PROCEDURE — 83735 ASSAY OF MAGNESIUM: CPT

## 2023-12-12 PROCEDURE — 2700000000 HC OXYGEN THERAPY PER DAY

## 2023-12-12 PROCEDURE — 2500000003 HC RX 250 WO HCPCS: Performed by: STUDENT IN AN ORGANIZED HEALTH CARE EDUCATION/TRAINING PROGRAM

## 2023-12-12 RX ORDER — DOCUSATE SODIUM 100 MG/1
200 CAPSULE, LIQUID FILLED ORAL 2 TIMES DAILY
Status: DISCONTINUED | OUTPATIENT
Start: 2023-12-12 | End: 2023-12-14 | Stop reason: HOSPADM

## 2023-12-12 RX ORDER — FENTANYL CITRATE 50 UG/ML
25 INJECTION, SOLUTION INTRAMUSCULAR; INTRAVENOUS ONCE
Status: COMPLETED | OUTPATIENT
Start: 2023-12-12 | End: 2023-12-12

## 2023-12-12 RX ORDER — HYDRALAZINE HYDROCHLORIDE 20 MG/ML
20 INJECTION INTRAMUSCULAR; INTRAVENOUS EVERY 4 HOURS PRN
Status: DISCONTINUED | OUTPATIENT
Start: 2023-12-12 | End: 2023-12-14 | Stop reason: HOSPADM

## 2023-12-12 RX ORDER — POLYETHYLENE GLYCOL 3350 17 G/17G
17 POWDER, FOR SOLUTION ORAL 2 TIMES DAILY
Status: DISCONTINUED | OUTPATIENT
Start: 2023-12-12 | End: 2023-12-14 | Stop reason: HOSPADM

## 2023-12-12 RX ORDER — HYDRALAZINE HYDROCHLORIDE 20 MG/ML
INJECTION INTRAMUSCULAR; INTRAVENOUS
Status: COMPLETED
Start: 2023-12-12 | End: 2023-12-12

## 2023-12-12 RX ORDER — CARVEDILOL 6.25 MG/1
12.5 TABLET ORAL 2 TIMES DAILY WITH MEALS
Status: DISCONTINUED | OUTPATIENT
Start: 2023-12-12 | End: 2023-12-14 | Stop reason: HOSPADM

## 2023-12-12 RX ORDER — INSULIN LISPRO 100 [IU]/ML
3 INJECTION, SOLUTION INTRAVENOUS; SUBCUTANEOUS
Status: DISCONTINUED | OUTPATIENT
Start: 2023-12-12 | End: 2023-12-14 | Stop reason: HOSPADM

## 2023-12-12 RX ORDER — INSULIN GLARGINE 100 [IU]/ML
7 INJECTION, SOLUTION SUBCUTANEOUS NIGHTLY
Status: DISCONTINUED | OUTPATIENT
Start: 2023-12-12 | End: 2023-12-14 | Stop reason: HOSPADM

## 2023-12-12 RX ADMIN — IPRATROPIUM BROMIDE AND ALBUTEROL SULFATE 1 DOSE: 2.5; .5 SOLUTION RESPIRATORY (INHALATION) at 20:39

## 2023-12-12 RX ADMIN — THIAMINE HYDROCHLORIDE 100 MG: 100 INJECTION, SOLUTION INTRAMUSCULAR; INTRAVENOUS at 09:20

## 2023-12-12 RX ADMIN — IPRATROPIUM BROMIDE AND ALBUTEROL SULFATE 1 DOSE: 2.5; .5 SOLUTION RESPIRATORY (INHALATION) at 14:14

## 2023-12-12 RX ADMIN — ATORVASTATIN CALCIUM 10 MG: 10 TABLET, FILM COATED ORAL at 09:21

## 2023-12-12 RX ADMIN — MICONAZOLE NITRATE: 20.6 POWDER TOPICAL at 21:18

## 2023-12-12 RX ADMIN — NYSTATIN 500000 UNITS: 100000 SUSPENSION ORAL at 09:20

## 2023-12-12 RX ADMIN — HYDRALAZINE HYDROCHLORIDE 20 MG: 20 INJECTION, SOLUTION INTRAMUSCULAR; INTRAVENOUS at 10:12

## 2023-12-12 RX ADMIN — IPRATROPIUM BROMIDE AND ALBUTEROL SULFATE 1 DOSE: 2.5; .5 SOLUTION RESPIRATORY (INHALATION) at 09:43

## 2023-12-12 RX ADMIN — ERTAPENEM SODIUM 1000 MG: 1 INJECTION INTRAMUSCULAR; INTRAVENOUS at 14:35

## 2023-12-12 RX ADMIN — ARFORMOTEROL TARTRATE 15 MCG: 15 SOLUTION RESPIRATORY (INHALATION) at 20:39

## 2023-12-12 RX ADMIN — HYDRALAZINE HYDROCHLORIDE 10 MG: 20 INJECTION, SOLUTION INTRAMUSCULAR; INTRAVENOUS at 01:01

## 2023-12-12 RX ADMIN — NYSTATIN 500000 UNITS: 100000 SUSPENSION ORAL at 21:06

## 2023-12-12 RX ADMIN — DIPHENHYDRAMINE HYDROCHLORIDE 25 MG: 50 INJECTION INTRAMUSCULAR; INTRAVENOUS at 05:29

## 2023-12-12 RX ADMIN — SACUBITRIL AND VALSARTAN 1 TABLET: 97; 103 TABLET, FILM COATED ORAL at 21:15

## 2023-12-12 RX ADMIN — FOLIC ACID 1 MG: 5 INJECTION, SOLUTION INTRAMUSCULAR; INTRAVENOUS; SUBCUTANEOUS at 09:17

## 2023-12-12 RX ADMIN — LEVETIRACETAM 750 MG: 100 INJECTION INTRAVENOUS at 15:47

## 2023-12-12 RX ADMIN — HYDRALAZINE HYDROCHLORIDE 20 MG: 20 INJECTION, SOLUTION INTRAMUSCULAR; INTRAVENOUS at 18:59

## 2023-12-12 RX ADMIN — PANTOPRAZOLE SODIUM 40 MG: 40 INJECTION, POWDER, FOR SOLUTION INTRAVENOUS at 09:20

## 2023-12-12 RX ADMIN — IPRATROPIUM BROMIDE AND ALBUTEROL SULFATE 1 DOSE: 2.5; .5 SOLUTION RESPIRATORY (INHALATION) at 17:17

## 2023-12-12 RX ADMIN — HYDRALAZINE HYDROCHLORIDE 50 MG: 50 TABLET, FILM COATED ORAL at 06:58

## 2023-12-12 RX ADMIN — DOCUSATE SODIUM 200 MG: 100 CAPSULE, LIQUID FILLED ORAL at 21:17

## 2023-12-12 RX ADMIN — MICONAZOLE NITRATE: 20.6 POWDER TOPICAL at 09:22

## 2023-12-12 RX ADMIN — ACETAMINOPHEN 650 MG: 325 TABLET ORAL at 18:58

## 2023-12-12 RX ADMIN — CLONIDINE HYDROCHLORIDE 0.3 MG: 0.2 TABLET ORAL at 09:21

## 2023-12-12 RX ADMIN — CLONIDINE HYDROCHLORIDE 0.3 MG: 0.2 TABLET ORAL at 21:09

## 2023-12-12 RX ADMIN — HYDRALAZINE HYDROCHLORIDE 20 MG: 20 INJECTION, SOLUTION INTRAMUSCULAR; INTRAVENOUS at 05:13

## 2023-12-12 RX ADMIN — BUDESONIDE INHALATION 500 MCG: 0.5 SUSPENSION RESPIRATORY (INHALATION) at 09:43

## 2023-12-12 RX ADMIN — POTASSIUM PHOSPHATE, MONOBASIC AND POTASSIUM PHOSPHATE, DIBASIC 20 MMOL: 224; 236 INJECTION, SOLUTION, CONCENTRATE INTRAVENOUS at 17:00

## 2023-12-12 RX ADMIN — HYDRALAZINE HYDROCHLORIDE 50 MG: 50 TABLET, FILM COATED ORAL at 21:11

## 2023-12-12 RX ADMIN — HYDRALAZINE HYDROCHLORIDE 50 MG: 50 TABLET, FILM COATED ORAL at 15:43

## 2023-12-12 RX ADMIN — INSULIN GLARGINE 7 UNITS: 100 INJECTION, SOLUTION SUBCUTANEOUS at 21:13

## 2023-12-12 RX ADMIN — POLYETHYLENE GLYCOL 3350 17 G: 17 POWDER, FOR SOLUTION ORAL at 21:08

## 2023-12-12 RX ADMIN — BUDESONIDE INHALATION 500 MCG: 0.5 SUSPENSION RESPIRATORY (INHALATION) at 20:39

## 2023-12-12 RX ADMIN — ARFORMOTEROL TARTRATE 15 MCG: 15 SOLUTION RESPIRATORY (INHALATION) at 09:44

## 2023-12-12 RX ADMIN — FENTANYL CITRATE 25 MCG: 50 INJECTION INTRAMUSCULAR; INTRAVENOUS at 05:28

## 2023-12-12 RX ADMIN — SENNOSIDES 10 ML: 8.8 LIQUID ORAL at 21:15

## 2023-12-12 RX ADMIN — CLONIDINE HYDROCHLORIDE 0.3 MG: 0.2 TABLET ORAL at 16:00

## 2023-12-12 ASSESSMENT — PAIN SCALES - GENERAL
PAINLEVEL_OUTOF10: 10
PAINLEVEL_OUTOF10: 9
PAINLEVEL_OUTOF10: 9

## 2023-12-12 ASSESSMENT — PAIN DESCRIPTION - LOCATION
LOCATION: BACK
LOCATION: BACK
LOCATION: ABDOMEN

## 2023-12-12 ASSESSMENT — PAIN DESCRIPTION - ONSET: ONSET: AWAKENED FROM SLEEP

## 2023-12-12 ASSESSMENT — PAIN SCALES - WONG BAKER
WONGBAKER_NUMERICALRESPONSE: 0

## 2023-12-12 ASSESSMENT — PAIN DESCRIPTION - DESCRIPTORS
DESCRIPTORS: SHARP
DESCRIPTORS: SHARP

## 2023-12-12 ASSESSMENT — PAIN DESCRIPTION - ORIENTATION
ORIENTATION: LEFT;POSTERIOR
ORIENTATION: LEFT;POSTERIOR

## 2023-12-12 ASSESSMENT — PAIN - FUNCTIONAL ASSESSMENT
PAIN_FUNCTIONAL_ASSESSMENT: INTOLERABLE, UNABLE TO DO ANY ACTIVE OR PASSIVE ACTIVITIES
PAIN_FUNCTIONAL_ASSESSMENT: INTOLERABLE, UNABLE TO DO ANY ACTIVE OR PASSIVE ACTIVITIES

## 2023-12-12 ASSESSMENT — PAIN DESCRIPTION - PAIN TYPE: TYPE: OTHER (COMMENT)

## 2023-12-12 ASSESSMENT — PAIN DESCRIPTION - FREQUENCY: FREQUENCY: INTERMITTENT

## 2023-12-12 NOTE — CARE COORDINATION
Care Coordination:  Patient remains in ICU . Pulmonology, gen. Surgery, ID, pulmonology ( has NIV for night use at home and PRN o2 from [facility] [place]. ) , neurology following. Cardene discontinued  Neurology for AMS, resolved with no intervention. Gen surgery for abdominal pain , on bowel routine. CSF studies planned to r/o meningitis but cancelled due to improved mental status ,  Invanz IV, Blood cultures pending. Patient with lives grandson and has 8 hour daily caregiver from [employer]. [facility] is following, . Orders needed. Plan is to return home Daughter will transport. Continue to follow.

## 2023-12-12 NOTE — PROGRESS NOTES
both lower legs 10/11/2017    Peripheral vascular disease ([address])     Pneumonia 01/26/2014    Pulmonary edema     resolved    PVD (peripheral vascular disease) with claudication ([address]) 08/30/2017    Sleep apnea     uses BI PAP    Status post peripheral artery angioplasty 10/31/2019    Tobacco abuse     Tobacco abuse 10/11/2017    Tobacco dependence 6/30/2022    Tubular adenoma polyp of rectum     Urinary tract infection due to ESBL Klebsiella 03/08/2017    Ventral hernia      Past Surgical History:        Procedure Laterality Date    ANGIOPLASTY  11/13/2018    Dr. Angelic Leonardo & athrectomy L SFA & Popliteal    BREAST SURGERY  2000    bilateral reduction    BRONCHIAL BRUSH BIOPSY  1/25/2013    Dr Juan Ramsay  04/20/2015    Dr. Rivera Rolling  12/2011     DR. Eliazar Teran,  follows Dr Sinai Ferreira  11/15/2013    Dr Jose Miguel Garcia    COLONOSCOPY  12/23/2016    Dr Stevens [facility] adenoma & hyperplastic polyps, melanosis coli (repeat one year 12/2017)    CORONARY ARTERY BYPASS GRAFT      ECHO COMPLETE  10/9/2013         ENDOSCOPY, COLON, DIAGNOSTIC  04/18/2017    GALLBLADDER SURGERY      gallstones removed, [facility] 8/2017    HYSTERECTOMY (CERVIX STATUS UNKNOWN)      JOINT REPLACEMENT  2011    LEFT KNEE    KNEE SURGERY      left knee replacement    LARYNGOSCOPY N/A 4/4/2022    DIRECT LARYNGOSCOPY WITH BIOPSY performed by Ekta Seen, DO at [address] Bilateral 10/4/2023    DIRECT LARYNGOSCOPY AND BIOPSY RIGHT FALSE VOCAL CORD performed by Ekta Seen, DO at [facility] [id number]    LIVER BIOPSY  1/8/2016     E's    POLYSOMNOGRAPHY  1/2016    [facility]    UPPER GASTROINTESTINAL ENDOSCOPY  12/20/12    UPPER GASTROINTESTINAL ENDOSCOPY  12/23/2016    Dr Simba Florentino GASTROINTESTINAL ENDOSCOPY  02/08/2017     Current Medications:   Scheduled Meds:   carvedilol  12.5 mg Oral BID WC    insulin PM    CLARITYU SL CLOUDY 05/16/2023 02:14 PM    SPECGRAV 1.025 12/09/2023 09:30 PM    LEUKOCYTESUR NEGATIVE 12/09/2023 09:30 PM    UROBILINOGEN 1.0 12/09/2023 09:30 PM    BILIRUBINUR NEGATIVE 12/09/2023 09:30 PM    BILIRUBINUR negative 03/03/2020 01:30 PM    BILIRUBINUR NEGATIVE 04/25/2011 08:10 PM    BLOODU Negative 05/16/2023 02:14 PM    GLUCOSEU NEGATIVE 12/09/2023 09:30 PM    GLUCOSEU NEGATIVE 04/25/2011 08:10 PM    AMORPHOUS RARE 04/10/2019 05:27 PM       No results found for: \"JHJ8LNJ\", \"BEART\", \"M8OIFGEX\", \"PHART\", \"THGBART\", \"GBS2LLU\", \"PO2ART\", \"QUX2MAS\"  Radiology:  XR CHEST PORTABLE   Final Result   1. Focal area of opacity in the left upper/mid lung zone suspicious for   pneumonia. 2. Nonspecific interstitial prominence, chronic changes versus component of   edema or atypical infection in the acute setting. Bibasilar subsegmental   atelectasis or scarring. 3. Enlarged cardiac silhouette. XR CHEST PORTABLE   Final Result   1. There are persistent patchy parenchymal densities within the lungs   concerning for atypical pneumonia      2. Mild cardiomegaly, stable. XR CHEST ABDOMEN NG PLACEMENT   Final Result   Catheter is in the stomach. US ABDOMEN COMPLETE    (Results Pending)       Microbiology:  Pending  No results for input(s): \"BC\" in the last 72 hours. No results for input(s): \"ORG\" in the last 72 hours. No results for input(s): \"BLOODCULT2\" in the last 72 hours. No results for input(s): \"STREPNEUMAGU\" in the last 72 hours. No results for input(s): \"LP1UAG\" in the last 72 hours. No results for input(s): \"ASO\" in the last 72 hours. No results for input(s): \"CULTRESP\" in the last 72 hours.     Assessment:  Altered mental status improved  Nonspecific encephalopathy and EEG  MRI brain did not show any acute abnormality  MRA brain showed right vertebral artery markedly diminutive  Hypertensive emergency on Cardene  E. coli UTI treated with ceftriaxone for 5 days, E. coli intermediate with cefazolin and resistant to Zosyn    Plan:    Initially planned for CSF studies but that was cancelled by neurology as mental status is improved. Start & Continue Areli Kenny. Plan to continue Invanz for total 3 days and then stop. Blood cultures no growth so far  ID will continue to follow    Thank you for having us see this patient in consultation. I will be discussing this case with the treating physicians.       Electronically signed by Leandro Caruso MD on 12/12/2023 at 12:11 PM

## 2023-12-12 NOTE — PROGRESS NOTES
Mobile nursing staff responded to pt bedside r/t pt screaming and arterial line not reading. Patient alert in bed, screaming that abdomen hurts. Arterial line straightened, reading sbp in 190s. Pt reports pain as new, sudden onset, sharp, and 10/10. Resident physicians notified in person and came to bedside to assess patient. Patient medicated with ordered hydralazine. Patient has multiple listed allergies to pain medications. Fentanyl ordered. Patient reports after previously receiving fentanyl for pain, she became itchy and was given benedryl, but no other issues. Orders received. Patient premedicated with prn benedryl, and then fentanyl. Instructed by provider to restart nicardipine if bp remains elevated. On followup, bp improved to 150s / 50s, but patient reports pain is worse. Transport arranged for ordered CT scan by [employer], RN.

## 2023-12-12 NOTE — PROGRESS NOTES
Well known to me with severe asthma/ copd and terri on NIV. Seen 2 days ago in [place] ICU and now awake alert and appropriate.    Additional Respiratory Assessments  Pulse: (!) 46  Respirations: 13  SpO2: 99 %      Current Facility-Administered Medications:     hydrALAZINE (APRESOLINE) injection 20 mg, 20 mg, IntraVENous, Q4H PRN, Emigdio Ewing MD, 20 mg at 12/12/23 1012    carvedilol (COREG) tablet 12.5 mg, 12.5 mg, Oral, BID CAROLE, Gregory Alejandro MD    insulin glargine (LANTUS) injection vial 7 Units, 7 Units, SubCUTAneous, Nightly, Gregory Alejandro MD    insulin lispro (HUMALOG) injection vial 3 Units, 3 Units, SubCUTAneous, TID , Gregory Alejandro MD    ertapenem (INVanz) 1,000 mg in sodium chloride 0.9 % 50 mL IVPB (mini-bag), 1,000 mg, IntraVENous, Q24H, Gregory Alejandro MD    potassium phosphate 20 mmol in sodium chloride 0.9 % 500 mL IVPB, 20 mmol, IntraVENous, Once, Gregory Alejandro MD    insulin regular (HUMULIN R;NOVOLIN R) injection 5 Units, 5 Units, SubCUTAneous, Once, Gregory Alejandro MD    white petrolatum ointment, , Topical, BID **AND** white petrolatum ointment, , Topical, TID PRN, Lionel Dotson MD    polyethylene glycol (GLYCOLAX) packet 17 g, 17 g, Oral, BID, Kiera Downs MD    docusate sodium (COLACE) capsule 200 mg, 200 mg, Oral, BID, Kiera Downs MD    sennosides (SENOKOT) 8.8 MG/5ML syrup 10 mL, 10 mL, Per NG tube, BID, Kiera Downs MD    nystatin (MYCOSTATIN) 000732 UNIT/ML suspension 500,000 Units, 5 mL, Oral, 4x Daily, Taufiq, Roman Mart MD, 500,000 Units at 12/12/23 0920    thiamine (B-1) injection 100 mg, 100 mg, IntraVENous, Daily, Gregory Alejandro MD, 100 mg at 65/00/95 3995    folic acid injection 1 mg, 1 mg, IntraVENous, Daily, Gregory Alejandro MD, 1 mg at 12/12/23 0917    sacubitril-valsartan (ENTRESTO)  MG per tablet 1 tablet, 1 tablet, Oral, BID, Gregory Alejandro MD, 1 tablet at 12/11/23 2103    aspirin chewable tablet 81 mg, 81 mg, Oral, Daily, Santana Davis Spondylosis 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    Lumbar disc herniation    Asymmetric septal hypertrophy (HCC)    Non-compliance    Hiatal hernia    Melanosis coli    Coronary artery disease involving native coronary artery of native heart without angina pectoris    DM2 (diabetes mellitus, type 2) ([facility])    Cigarette smoker    Marijuana use, smoked    Ischemic cardiomyopathy    Encounter for tobacco use cessation counseling    PVD (peripheral vascular disease) with claudication ([facility])    Tobacco abuse    Chronic venous insufficiency    History of non-ST elevation myocardial infarction (NSTEMI)    Prolonged Q-T interval on ECG    History of stroke    COPD exacerbation ([facility])    Chronic respiratory failure with hypoxia and hypercapnia ([facility])    COPD (chronic obstructive pulmonary disease) ([facility])    Status post peripheral artery angioplasty    Uncontrolled hypertension    O2 dependent    Respiratory distress    Acute on chronic diastolic (congestive) heart failure ([facility])    CKD (chronic kidney disease) stage 3, GFR 30-59 ml/min ([facility])    Hypertensive emergency    Vocal cord mass    Tobacco dependence    Postoperative abscess    Lesion of true vocal cord    ALEC (acute kidney injury) ([address])    Acute cystitis without hematuria    Acute on chronic renal insufficiency    Hyperkalemia    Metabolic encephalopathy     HTN encephalopathy back to baseline nl neuro status  Asthma/ COPD at baseline  DAYAN on NIV at hs.   Eddi Pastor MD  12/12/2023  2:22 PM

## 2023-12-12 NOTE — CONSULTS
GENERAL SURGERY  CONSULT NOTE  12/12/2023    Physician Consulted: Dr. Ed Goss  Reason for Consult: Abdominal pain  Referring Physician: Dr. Susan MANN  Ronald Amador is a 75 y.o. female who initially presented to [facility] with AMS and hypertensive emergency. She has history of CAD, CKD, CHF, COPD. Prior surgical history of cholecystectomy, hernia repair, open heart surgery. She was transferred to [facility] for neurology evaluation after brain imaging showed no acute abnormalities. She was on a cardene drip but is currently off. Surgery is consulted due to acute onset abdominal pain that started this morning. She states it was sharp and diffuse across her whole abdomen with associated nausea. She then proceeded to have a large BM. Patient is on Linzess at home, takes 290 mg daily. She has not received it during her hospitalization and stated prior to this, she had not had a bowel movement in over 7 days. She says her abdomen is now feeling better, only tender with deep palpation. Denies any nausea or vomiting currently. Her AMS has resolved and there are no further workups planned per neurology.      Past Medical History:   Diagnosis Date    Asthma     Atherosclerosis of native artery of left leg with rest pain ([address]) 11/09/2018    CAD (coronary artery disease) 2011    Cellulitis and abscess of trunk 04/14/2015    Cerebellar infarct ([address]) 04/03/2019    Remote, rt. cerebellum, head CT scan, 1/9/19    Chronic back pain     Chronic kidney disease     Chronic systolic congestive heart failure ([address]) 10/04/2013    Chronic venous insufficiency 10/11/2017    COPD (chronic obstructive pulmonary disease) ([address])     COVID-19     Depression     Diabetes mellitus ([address])     Diabetic neuropathy ([address])     Diverticulosis     Fatty liver 01/08/2016    per US    Glaucoma, open angle 02/01/2016    Mild-OU    Hepatic encephalopathy ([address]) 02/07/2016    resolved    Hiatal hernia     History of blood transfusion     Hyperlipidemia considering rotation. Chest evaluation is reported separately. No dilated bowels are visible. Catheter is in the stomach. XR ABDOMEN FOR NG/OG/NE TUBE PLACEMENT    Result Date: 12/10/2023  EXAMINATION: ONE SUPINE XRAY VIEW(S) OF THE ABDOMEN 12/10/2023 10:05 am COMPARISON: Chest radiograph from December 5, 2023 HISTORY: ORDERING SYSTEM PROVIDED HISTORY: Confirmation of course of NG/OG/NE tube and location of tip of tube TECHNOLOGIST PROVIDED HISTORY: Reason for exam:->Confirmation of course of NG/OG/NE tube and location of tip of tube Portable? ->Yes FINDINGS: The enteric catheter extends below the diaphragm and terminates in the region of the body of the stomach. The partially imaged cardiomediastinal silhouette appears to be enlarged. Sternotomy wires are present. Enteric catheter extending below the diaphragm, its tip overlying the expected region of the body of the stomach. ASSESSMENT:  75 y.o. female with abdominal pain prior to having her first large BM in over 1 week. She is normally on Linzess at home and has not received it during her hospital stay. PLAN:  - aggressive bowel regimen  - diet as tolerated  - no need for further imaging at this time  - OOB, ambulate as able   - prn pain control  - prn anti-emetics    Will be discussed with Dr. Fannie Barahona.     Electronically signed by Aquiles Ceballos MD on 12/12/23 at 12:38 PM EST    ATTENDING PHYSICIAN PROGRESS NOTE     Diagnosis of abdominal pain   External Notes reviewed including ICU notes   CBC, CMP personally reviewed  CT was personally evaluated  Continue with bowel regiment         Juarez Olsen MD

## 2023-12-12 NOTE — PLAN OF CARE
Problem: Safety - Adult  Goal: Free from fall injury  12/11/2023 2124 by Doreen Lopez RN  Outcome: Progressing  12/11/2023 0939 by Doug Stephenson RN  Outcome: Progressing  Flowsheets (Taken 12/11/2023 0800)  Free From Fall Injury: Instruct family/caregiver on patient safety     Problem: Chronic Conditions and Co-morbidities  Goal: Patient's chronic conditions and co-morbidity symptoms are monitored and maintained or improved  12/11/2023 2124 by Doreen oLpez RN  Outcome: Progressing  12/11/2023 0939 by Doug Stephenson RN  Outcome: Progressing  Flowsheets (Taken 12/11/2023 0800)  Care Plan - Patient's Chronic Conditions and Co-Morbidity Symptoms are Monitored and Maintained or Improved: Monitor and assess patient's chronic conditions and comorbid symptoms for stability, deterioration, or improvement     Problem: Discharge Planning  Goal: Discharge to home or other facility with appropriate resources  12/11/2023 2124 by Doreen Lopez RN  Outcome: Progressing  12/11/2023 0939 by Doug Stephenson RN  Outcome: Progressing  Flowsheets (Taken 12/11/2023 0800)  Discharge to home or other facility with appropriate resources: Identify barriers to discharge with patient and caregiver     Problem: Pain  Goal: Verbalizes/displays adequate comfort level or baseline comfort level  12/11/2023 2124 by Doreen Lopez RN  Outcome: Progressing  12/11/2023 0939 by Doug Stephenson RN  Outcome: Progressing  Flowsheets (Taken 12/11/2023 0800)  Verbalizes/displays adequate comfort level or baseline comfort level: Encourage patient to monitor pain and request assistance     Problem: Skin/Tissue Integrity  Goal: Absence of new skin breakdown  Description: 1. Monitor for areas of redness and/or skin breakdown  2. Assess vascular access sites hourly  3. Every 4-6 hours minimum:  Change oxygen saturation probe site  4.   Every 4-6 hours:  If on nasal continuous positive airway pressure,

## 2023-12-12 NOTE — PROGRESS NOTES
insulin glargine  7 Units SubCUTAneous Nightly    insulin lispro  3 Units SubCUTAneous TID     ertapenem (INVanz) IVPB  1,000 mg IntraVENous Q24H    potassium phosphate IVPB (PERIPHERAL LINE)  20 mmol IntraVENous Once    insulin regular  5 Units SubCUTAneous Once    white petrolatum   Topical BID    polyethylene glycol  17 g Oral BID    docusate sodium  200 mg Oral BID    sennosides  10 mL Per NG tube BID    nystatin  5 mL Oral 4x Daily    thiamine  100 mg IntraVENous Daily    folic acid  1 mg IntraVENous Daily    sacubitril-valsartan  1 tablet Oral BID    aspirin  81 mg Oral Daily    [Held by provider] baclofen  10 mg Oral BID    cetirizine  10 mg Oral Daily    [Held by provider] DULoxetine  60 mg Oral Daily    fluticasone  2 spray Each Nostril Daily    [Held by provider] gabapentin  100 mg Oral BID    [Held by provider] montelukast  10 mg Oral Nightly    [Held by provider] oxybutynin  10 mg Oral Daily    [Held by provider] traZODone  100 mg Oral Nightly    sodium chloride flush  5-40 mL IntraVENous 2 times per day    budesonide  0.5 mg Nebulization BID RT    And    arformoterol tartrate  15 mcg Nebulization BID RT    enoxaparin  40 mg SubCUTAneous Daily    lidocaine  1 patch TransDERmal Daily    miconazole   Topical BID    levETIRAcetam  750 mg IntraVENous Q12H    pantoprazole (PROTONIX) 40 mg in sodium chloride (PF) 0.9 % 10 mL injection  40 mg IntraVENous Daily    cloNIDine  0.3 mg Oral TID    hydrALAZINE  50 mg Oral 3 times per day    atorvastatin  10 mg Oral Daily    ipratropium 0.5 mg-albuterol 2.5 mg  1 Dose Inhalation Q4H WA RT     PRN Meds: hydrALAZINE, white petrolatum **AND** white petrolatum, sodium chloride flush, sodium chloride, ondansetron **OR** ondansetron, acetaminophen **OR** acetaminophen, dextrose bolus **OR** dextrose bolus, glucagon (rDNA), dextrose, diphenhydrAMINE, LORazepam, medicated lip balm, glucose    Labs:     Recent Labs     12/10/23  0400 12/11/23  0454 12/12/23  0554   WBC 8.5 5.6 9.5   HGB 10.6* 9.1* 9.8*   HCT 33.0* 29.3* 30.6*    248 312         Recent Labs     12/10/23  0400 12/11/23  0454 12/12/23  0554    145 143   K 4.3 4.8 4.2   * 110* 106   CO2 20* 20* 19*   BUN 19 22 31*   CREATININE 1.0 1.0 1.0   CALCIUM 9.0 7.7* 8.5*   PHOS 2.6 3.3 1.9*         Recent Labs     12/09/23  2130 12/10/23  0400 12/11/23  0454   PROT 6.4 6.8 5.9*   ALKPHOS 190* 200* 166*   ALT 24 33* 23   AST 13 14 8   BILITOT 0.7 0.6 0.5         Recent Labs     12/11/23  0454   INR 1.3         No results for input(s): \"CKTOTAL\", \"TROPONINI\" in the last 72 hours. Chronic labs:    Lab Results   Component Value Date    CHOL 138 03/18/2023    TRIG 180 (H) 03/18/2023    HDL 32 03/18/2023    LDLCALC 70 03/18/2023    TSH 1.13 12/09/2023    INR 1.3 12/11/2023    LABA1C 6.3 (H) 12/06/2023       Radiology: REVIEWED DAILY    +++++++++++++++++++++++++++++++++++++++++++++++++  John Cisneros MD  [address], [place]  +++++++++++++++++++++++++++++++++++++++++++++++++  NOTE: This report was transcribed using voice recognition software. Every effort was made to ensure accuracy; however, inadvertent computerized transcription errors may be present.

## 2023-12-12 NOTE — PROGRESS NOTES
Report called to 39. Patients daughter updated on room change. All questions answered at this time. Unit phone number and visiting hours provided.

## 2023-12-12 NOTE — FLOWSHEET NOTE
Inpatient Wound Care(initial evaluation) 4427    Admit Date: 12/10/2023  5:59 PM    Reason for consult:  right lateral breast    Significant history:  admitted with AMS  Refer to H & P    Wound history:  admitted with wound from home    Findings:     12/12/23 1055   Skin Integumentary    Skin Integrity Ecchymosis   Location BUE   Skin Fold Management Yes   Dressing Site Abdominal pannus;Groin   Treatment Pharmaceutical  (nurse to apply)   Assessed This Shift Red   Skin Integrity Site 2   Skin Integrity Location 2 Excoriation; Redness   Location 2 abdominal fold   Skin Integrity Site 3   Skin Integrity Location 3   (dry flaky skin)    Location 3 feet/legs   Wound 12/08/23 Breast Lateral;Right   Date First Assessed/Time First Assessed: 12/08/23 1158   Present on Original Admission: No  Location: Breast  Wound Location Orientation: Lateral;Right   Wound Image    Dressing/Treatment Non adherent  (nurse to apply)   Wound Length (cm) 5 cm   Wound Width (cm) 1.6 cm   Wound Depth (cm) 0.1 cm   Wound Surface Area (cm^2) 8 cm^2   Change in Wound Size % (l*w) 14.53   Wound Volume (cm^3) 0.8 cm^3   Wound Assessment   (ruptured blister- see media)   Drainage Amount None (dry)   Kely-wound Assessment Dry/flaky   Wound Thickness Description not for Pressure Injury Partial thickness       **Informed Consent**    photos taken of wound and inserted into their chart as part of their permanent medical record for purposes of documentation, treatment management and/or medical review. All Images taken on 12/12/23 of patient name: Yeni Acosta were transmitted and stored on secured [employer] located within [facility] by a registered [employer] Mobile Application Device.       Impression:  partial thickness wound    Plan:  Versatel one dressing to right breast  Dietary consult  Aquaphor to dry skin  Antifungal to folds  Will need continued preventative care    Martha Rocha RN 12/12/2023 11:53 AM

## 2023-12-12 NOTE — PROGRESS NOTES
SPEECH/LANGUAGE PATHOLOGY  CLINICAL ASSESSMENT OF SWALLOWING FUNCTION   and PLAN OF CARE  PATIENT NAME:  Denise Stevens  (female)     MRN:  [id number]    :  1955  (75 y.o.)  STATUS:  Inpatient: Room 4427/4427-A    TODAY'S DATE:  2023  REFERRING PROVIDER:    SPECIFIC PROVIDER ORDER: SLP eval and treat Date of order:  12-10-23  REASON FOR REFERRAL: dysphagia   EVALUATING THERAPIST: Evalyn Claude, SLP                 ASSESSMENT:    DYSPHAGIA DIAGNOSIS:   Clinical indicators of functional oropharyngeal swallow for age/premorbid functioning      DIET RECOMMENDATIONS:  Easy to chew consistency solids (IDDSI level 7, transitional) with  thin liquids (IDDSI level 0)     FEEDING RECOMMENDATIONS:     Assistance level:  No assistance needed      Compensatory strategies recommended: No strategies are recommended at this time      Discussed recommendations with nursing?: No secondary to no diet/liquid change recommended     SPEECH THERAPY  PLAN OF CARE   The dysphagia POC is established based on physician order, dysphagia diagnosis and results of clinical assessment     Dysphagia therapy is not recommended     Conditions Requiring Skilled Therapeutic Intervention for dysphagia:    not applicable    Specific dysphagia interventions to include:     Not applicable    Specific instructions for next treatment:  not applicable   Patient Treatment Goals:    Short Term Goals:  Not applicable no therapy warranted     Long Term Goals:   Not applicable no therapy warranted      Patient/family Goal:    not applicable                    ADMITTING DIAGNOSIS: Hypertensive emergency [I16.1]    VISIT DIAGNOSIS:      PATIENT REPORT/COMPLAINT: denies difficulty swallowing  nursing not available at time of evaluation chart reviewed and patient is not NPO for any procedures per current documentation. PRIOR LEVEL OF SWALLOW FUNCTION:    PAST HISTORY OF DYSPHAGIA?: none reported      Current Diet Order: ADULT DIET;  Clear Liquid    PROCEDURE:  Consistencies Administered During the Evaluation   Liquids: thin liquid   Solids:  pureed foods and soft solid foods      Method of Intake:   cup, straw, spoon  Self fed      Position:   Seated, upright    CLINICAL ASSESSMENT  Oral Stage: The oral stage of swallowing was within functional limits      Pharyngeal Stage:    No signs of aspiration were noted during this evaluation however, silent aspiration cannot be ruled out at bedside. If silent aspiration is suspected, a Videofluoroscopic Study of Swallowing (MBS) is recommended and requires a physician order. Cognition:   Confusion noted    Oral Peripheral Examination   Adequate lingual/labial strength     Current Respiratory Status    2 liters O2 via nasal cannula     Parameters of Speech Production  Respiration:  Adequate for speech production  Quality:   Within functional limits  Intensity: Within functional limits    Volitional Swallow: Present    Volitional Cough:    Present    Pain: No pain reported. EDUCATION:   The Speech Language Pathologist (SLP) completed education regarding results of evaluation and that intervention is not warranted at this time. Learner: Patient  Education:  Reviewed results and recommendations of this evaluation and Reviewed diet and strategies  Evaluation of Education: Kathy Guallpa understanding    This plan will be re-evaluated and revised in 1 week  if warranted. CPT code:  79399  bedside swallow eval      [x]The admitting diagnosis and active problem list, have been reviewed prior to initiation of this evaluation.         ACTIVE PROBLEM LIST:   Patient Active Problem List   Diagnosis    Severe obesity (BMI 35.0-35.9 with comorbidity) ([address])    Hyperlipidemia    DAYAN and COPD overlap syndrome (HCC)    Vitamin D insufficiency    Chronic combined systolic and diastolic heart failure (HCC)    GERD (gastroesophageal reflux disease)    Major depressive disorder, recurrent episode, mild ([address])

## 2023-12-13 ENCOUNTER — APPOINTMENT (OUTPATIENT)
Dept: NEUROLOGY | Age: 68
DRG: 077 | End: 2023-12-13
Attending: INTERNAL MEDICINE
Payer: MEDICARE

## 2023-12-13 ENCOUNTER — APPOINTMENT (OUTPATIENT)
Dept: ULTRASOUND IMAGING | Age: 68
DRG: 077 | End: 2023-12-13
Attending: INTERNAL MEDICINE
Payer: MEDICARE

## 2023-12-13 LAB
ANION GAP SERPL CALCULATED.3IONS-SCNC: 9 MMOL/L (ref 7–16)
B BURGDOR AB SER IA-ACNC: 0.3 IV
BUN SERPL-MCNC: 33 MG/DL (ref 6–23)
CALCIUM SERPL-MCNC: 8.3 MG/DL (ref 8.6–10.2)
CHLORIDE SERPL-SCNC: 104 MMOL/L (ref 98–107)
CO2 SERPL-SCNC: 24 MMOL/L (ref 22–29)
CREAT SERPL-MCNC: 1.3 MG/DL (ref 0.5–1)
ERYTHROCYTE [DISTWIDTH] IN BLOOD BY AUTOMATED COUNT: 13.2 % (ref 11.5–15)
GFR SERPL CREATININE-BSD FRML MDRD: 44 ML/MIN/1.73M2
GLUCOSE BLD-MCNC: 146 MG/DL (ref 74–99)
GLUCOSE BLD-MCNC: 183 MG/DL (ref 74–99)
GLUCOSE BLD-MCNC: 235 MG/DL (ref 74–99)
GLUCOSE SERPL-MCNC: 205 MG/DL (ref 74–99)
HCT VFR BLD AUTO: 31.2 % (ref 34–48)
HGB BLD-MCNC: 9.9 G/DL (ref 11.5–15.5)
HSV 1 SUBTYPE BY PCR: NOT DETECTED
HSV 2 SUBTYPE BY PCR: NOT DETECTED
HSV SOURCE: NORMAL
MAGNESIUM SERPL-MCNC: 2.1 MG/DL (ref 1.6–2.6)
MCH RBC QN AUTO: 31.6 PG (ref 26–35)
MCHC RBC AUTO-ENTMCNC: 31.7 G/DL (ref 32–34.5)
MCV RBC AUTO: 99.7 FL (ref 80–99.9)
PHOSPHATE SERPL-MCNC: 2.9 MG/DL (ref 2.5–4.5)
PLATELET # BLD AUTO: 293 K/UL (ref 130–450)
PMV BLD AUTO: 10.3 FL (ref 7–12)
POTASSIUM SERPL-SCNC: 4 MMOL/L (ref 3.5–5)
RBC # BLD AUTO: 3.13 M/UL (ref 3.5–5.5)
SODIUM SERPL-SCNC: 137 MMOL/L (ref 132–146)
WBC OTHER # BLD: 7.6 K/UL (ref 4.5–11.5)

## 2023-12-13 PROCEDURE — 94660 CPAP INITIATION&MGMT: CPT

## 2023-12-13 PROCEDURE — 5A09357 ASSISTANCE WITH RESPIRATORY VENTILATION, LESS THAN 24 CONSECUTIVE HOURS, CONTINUOUS POSITIVE AIRWAY PRESSURE: ICD-10-PCS | Performed by: INTERNAL MEDICINE

## 2023-12-13 PROCEDURE — 6370000000 HC RX 637 (ALT 250 FOR IP)

## 2023-12-13 PROCEDURE — 6370000000 HC RX 637 (ALT 250 FOR IP): Performed by: INTERNAL MEDICINE

## 2023-12-13 PROCEDURE — 85027 COMPLETE CBC AUTOMATED: CPT

## 2023-12-13 PROCEDURE — 6370000000 HC RX 637 (ALT 250 FOR IP): Performed by: STUDENT IN AN ORGANIZED HEALTH CARE EDUCATION/TRAINING PROGRAM

## 2023-12-13 PROCEDURE — 82962 GLUCOSE BLOOD TEST: CPT

## 2023-12-13 PROCEDURE — 2060000000 HC ICU INTERMEDIATE R&B

## 2023-12-13 PROCEDURE — 2700000000 HC OXYGEN THERAPY PER DAY

## 2023-12-13 PROCEDURE — 84100 ASSAY OF PHOSPHORUS: CPT

## 2023-12-13 PROCEDURE — 2580000003 HC RX 258: Performed by: INTERNAL MEDICINE

## 2023-12-13 PROCEDURE — 95816 EEG AWAKE AND DROWSY: CPT | Performed by: PSYCHIATRY & NEUROLOGY

## 2023-12-13 PROCEDURE — 6360000002 HC RX W HCPCS: Performed by: INTERNAL MEDICINE

## 2023-12-13 PROCEDURE — 83735 ASSAY OF MAGNESIUM: CPT

## 2023-12-13 PROCEDURE — 94640 AIRWAY INHALATION TREATMENT: CPT

## 2023-12-13 PROCEDURE — 95819 EEG AWAKE AND ASLEEP: CPT

## 2023-12-13 PROCEDURE — 80048 BASIC METABOLIC PNL TOTAL CA: CPT

## 2023-12-13 PROCEDURE — 76700 US EXAM ABDOM COMPLETE: CPT

## 2023-12-13 PROCEDURE — 36415 COLL VENOUS BLD VENIPUNCTURE: CPT

## 2023-12-13 PROCEDURE — 99232 SBSQ HOSP IP/OBS MODERATE 35: CPT | Performed by: INTERNAL MEDICINE

## 2023-12-13 RX ORDER — HYDRALAZINE HYDROCHLORIDE 50 MG/1
100 TABLET, FILM COATED ORAL EVERY 8 HOURS SCHEDULED
Status: DISCONTINUED | OUTPATIENT
Start: 2023-12-13 | End: 2023-12-14 | Stop reason: HOSPADM

## 2023-12-13 RX ORDER — LEVETIRACETAM 500 MG/5ML
500 INJECTION, SOLUTION, CONCENTRATE INTRAVENOUS EVERY 12 HOURS
Status: DISCONTINUED | OUTPATIENT
Start: 2023-12-14 | End: 2023-12-14

## 2023-12-13 RX ORDER — HEPARIN 100 UNIT/ML
1 SYRINGE INTRAVENOUS EVERY 12 HOURS SCHEDULED
Status: DISCONTINUED | OUTPATIENT
Start: 2023-12-13 | End: 2023-12-14 | Stop reason: HOSPADM

## 2023-12-13 RX ORDER — SODIUM CHLORIDE 9 MG/ML
INJECTION, SOLUTION INTRAVENOUS PRN
Status: DISCONTINUED | OUTPATIENT
Start: 2023-12-13 | End: 2023-12-14 | Stop reason: HOSPADM

## 2023-12-13 RX ORDER — SODIUM CHLORIDE 0.9 % (FLUSH) 0.9 %
5-40 SYRINGE (ML) INJECTION PRN
Status: DISCONTINUED | OUTPATIENT
Start: 2023-12-13 | End: 2023-12-14 | Stop reason: HOSPADM

## 2023-12-13 RX ORDER — HEPARIN 100 UNIT/ML
1 SYRINGE INTRAVENOUS PRN
Status: DISCONTINUED | OUTPATIENT
Start: 2023-12-13 | End: 2023-12-14 | Stop reason: HOSPADM

## 2023-12-13 RX ORDER — LINACLOTIDE 290 UG/1
290 CAPSULE, GELATIN COATED ORAL
COMMUNITY
Start: 2023-11-24

## 2023-12-13 RX ORDER — SODIUM CHLORIDE 0.9 % (FLUSH) 0.9 %
5-40 SYRINGE (ML) INJECTION EVERY 12 HOURS SCHEDULED
Status: DISCONTINUED | OUTPATIENT
Start: 2023-12-13 | End: 2023-12-14 | Stop reason: HOSPADM

## 2023-12-13 RX ADMIN — ARFORMOTEROL TARTRATE 15 MCG: 15 SOLUTION RESPIRATORY (INHALATION) at 07:48

## 2023-12-13 RX ADMIN — ERTAPENEM SODIUM 1000 MG: 1 INJECTION INTRAMUSCULAR; INTRAVENOUS at 19:08

## 2023-12-13 RX ADMIN — ATORVASTATIN CALCIUM 10 MG: 10 TABLET, FILM COATED ORAL at 11:59

## 2023-12-13 RX ADMIN — IPRATROPIUM BROMIDE AND ALBUTEROL SULFATE 1 DOSE: 2.5; .5 SOLUTION RESPIRATORY (INHALATION) at 12:19

## 2023-12-13 RX ADMIN — IPRATROPIUM BROMIDE AND ALBUTEROL SULFATE 1 DOSE: 2.5; .5 SOLUTION RESPIRATORY (INHALATION) at 19:57

## 2023-12-13 RX ADMIN — ARFORMOTEROL TARTRATE 15 MCG: 15 SOLUTION RESPIRATORY (INHALATION) at 19:57

## 2023-12-13 RX ADMIN — ASPIRIN 81 MG: 81 TABLET, CHEWABLE ORAL at 11:59

## 2023-12-13 RX ADMIN — PETROLATUM: 420 OINTMENT TOPICAL at 11:58

## 2023-12-13 RX ADMIN — ACETAMINOPHEN 650 MG: 325 TABLET ORAL at 17:28

## 2023-12-13 RX ADMIN — HYDRALAZINE HYDROCHLORIDE 50 MG: 50 TABLET, FILM COATED ORAL at 05:59

## 2023-12-13 RX ADMIN — BUDESONIDE INHALATION 500 MCG: 0.5 SUSPENSION RESPIRATORY (INHALATION) at 07:48

## 2023-12-13 RX ADMIN — ENOXAPARIN SODIUM 40 MG: 100 INJECTION SUBCUTANEOUS at 11:57

## 2023-12-13 RX ADMIN — NYSTATIN 500000 UNITS: 100000 SUSPENSION ORAL at 11:58

## 2023-12-13 RX ADMIN — SACUBITRIL AND VALSARTAN 1 TABLET: 97; 103 TABLET, FILM COATED ORAL at 11:59

## 2023-12-13 RX ADMIN — CLONIDINE HYDROCHLORIDE 0.3 MG: 0.2 TABLET ORAL at 17:28

## 2023-12-13 RX ADMIN — INSULIN GLARGINE 7 UNITS: 100 INJECTION, SOLUTION SUBCUTANEOUS at 21:24

## 2023-12-13 RX ADMIN — HYDRALAZINE HYDROCHLORIDE 100 MG: 50 TABLET, FILM COATED ORAL at 17:27

## 2023-12-13 RX ADMIN — CLONIDINE HYDROCHLORIDE 0.3 MG: 0.2 TABLET ORAL at 21:09

## 2023-12-13 RX ADMIN — PETROLATUM: 420 OINTMENT TOPICAL at 20:29

## 2023-12-13 RX ADMIN — MICONAZOLE NITRATE: 20.6 POWDER TOPICAL at 20:29

## 2023-12-13 RX ADMIN — CLONIDINE HYDROCHLORIDE 0.3 MG: 0.2 TABLET ORAL at 11:59

## 2023-12-13 RX ADMIN — CETIRIZINE HYDROCHLORIDE 10 MG: 10 TABLET, FILM COATED ORAL at 12:00

## 2023-12-13 RX ADMIN — IPRATROPIUM BROMIDE AND ALBUTEROL SULFATE 1 DOSE: 2.5; .5 SOLUTION RESPIRATORY (INHALATION) at 07:47

## 2023-12-13 RX ADMIN — DOCUSATE SODIUM 200 MG: 100 CAPSULE, LIQUID FILLED ORAL at 11:58

## 2023-12-13 RX ADMIN — BUDESONIDE INHALATION 500 MCG: 0.5 SUSPENSION RESPIRATORY (INHALATION) at 19:57

## 2023-12-13 RX ADMIN — SACUBITRIL AND VALSARTAN 1 TABLET: 97; 103 TABLET, FILM COATED ORAL at 21:09

## 2023-12-13 ASSESSMENT — PAIN DESCRIPTION - DESCRIPTORS: DESCRIPTORS: ACHING;DISCOMFORT

## 2023-12-13 ASSESSMENT — PAIN DESCRIPTION - LOCATION: LOCATION: LEG

## 2023-12-13 ASSESSMENT — PAIN SCALES - GENERAL: PAINLEVEL_OUTOF10: 9

## 2023-12-13 ASSESSMENT — PAIN - FUNCTIONAL ASSESSMENT: PAIN_FUNCTIONAL_ASSESSMENT: ACTIVITIES ARE NOT PREVENTED

## 2023-12-13 ASSESSMENT — PAIN DESCRIPTION - ORIENTATION: ORIENTATION: RIGHT

## 2023-12-13 NOTE — ACP (ADVANCE CARE PLANNING)
Advance Care Planning   Healthcare Decision Maker:    Primary Decision Maker: Wade Bangura - Child - [phone]    Secondary Decision Maker: Renea Myrick - Brother/Sister - [phone]    Click here to complete Healthcare Decision Makers including selection of the Healthcare Decision Maker Relationship (ie \"Primary\").

## 2023-12-13 NOTE — PLAN OF CARE
Problem: Safety - Adult  Goal: Free from fall injury  Outcome: Progressing     Problem: Chronic Conditions and Co-morbidities  Goal: Patient's chronic conditions and co-morbidity symptoms are monitored and maintained or improved  Outcome: Progressing     Problem: Discharge Planning  Goal: Discharge to home or other facility with appropriate resources  Outcome: Progressing     Problem: Pain  Goal: Verbalizes/displays adequate comfort level or baseline comfort level  Outcome: Progressing     Problem: Skin/Tissue Integrity  Goal: Absence of new skin breakdown  Description: 1. Monitor for areas of redness and/or skin breakdown  2. Assess vascular access sites hourly  3. Every 4-6 hours minimum:  Change oxygen saturation probe site  4. Every 4-6 hours:  If on nasal continuous positive airway pressure, respiratory therapy assess nares and determine need for appliance change or resting period. Outcome: Progressing     Problem: ABCDS Injury Assessment  Goal: Absence of physical injury  Outcome: Progressing     Problem: Confusion  Goal: Confusion, delirium, dementia, or psychosis is improved or at baseline  Description: INTERVENTIONS:  1. Assess for possible contributors to thought disturbance, including medications, impaired vision or hearing, underlying metabolic abnormalities, dehydration, psychiatric diagnoses, and notify attending LIP  2. [place] high risk fall precautions, as indicated  3. Provide frequent short contacts to provide reality reorientation, refocusing and direction  4. Decrease environmental stimuli, including noise as appropriate  5. Monitor and intervene to maintain adequate nutrition, hydration, elimination, sleep and activity  6. If unable to ensure safety without constant attention obtain sitter and review sitter guidelines with assigned personnel  7.  Initiate Psychosocial CNS and Spiritual Care consult, as indicated  Outcome: Progressing

## 2023-12-13 NOTE — PLAN OF CARE
Plan of care    EEG was normal  Can wean off Keppra changed to 500 mg BID for 3 days then stop   No further Neuro testing at this time  Neurology to sign off

## 2023-12-13 NOTE — PROGRESS NOTES
Date: 12/13/2023    Time: 12:19 AM    Patient Placed On BIPAP/CPAP/ Non-Invasive Ventilation? Yes    If no must comment. Facial area red/color change? No           If YES are Blister/Lesion present? No   If yes must notify nursing staff  BIPAP/CPAP skin barrier?   Yes    Skin barrier type:mepilexlite       Comments:        Kellie Montejo RCP

## 2023-12-13 NOTE — PROGRESS NOTES
HISTORY: acute respiratory insufficiency TECHNOLOGIST PROVIDED HISTORY: Reason for exam:->acute respiratory insufficiency What reading provider will be dictating this exam?->CRC FINDINGS: Patient is status post median sternotomy. The tip of the weighted feeding tube projects below the duodenum. There are persistent patchy parenchymal densities within the lungs left greater than right concerning for atypical pneumonia or viral airway disease. The pulmonary vasculature is within normal range for the heart size is prominent but stable when compared the previous study. 1.  There are persistent patchy parenchymal densities within the lungs concerning for atypical pneumonia 2. Mild cardiomegaly, stable. XR CHEST ABDOMEN NG PLACEMENT    Result Date: 12/10/2023  EXAMINATION: ONE SUPINE XRAY VIEW(S) OF THE ABDOMEN 12/10/2023 8:32 pm COMPARISON: None. HISTORY: ORDERING SYSTEM PROVIDED HISTORY: NG tube placement and tip confirmation TECHNOLOGIST PROVIDED HISTORY: Reason for exam:->NG tube placement and tip confirmation What reading provider will be dictating this exam?->CRC FINDINGS: Esophageal route catheter extends into the central abdomen, expected stomach location considering rotation. Chest evaluation is reported separately. No dilated bowels are visible. Catheter is in the stomach. XR ABDOMEN FOR NG/OG/NE TUBE PLACEMENT    Result Date: 12/10/2023  EXAMINATION: ONE SUPINE XRAY VIEW(S) OF THE ABDOMEN 12/10/2023 10:05 am COMPARISON: Chest radiograph from December 5, 2023 HISTORY: ORDERING SYSTEM PROVIDED HISTORY: Confirmation of course of NG/OG/NE tube and location of tip of tube TECHNOLOGIST PROVIDED HISTORY: Reason for exam:->Confirmation of course of NG/OG/NE tube and location of tip of tube Portable? ->Yes FINDINGS: The enteric catheter extends below the diaphragm and terminates in the region of the body of the stomach. The partially imaged cardiomediastinal silhouette appears to be enlarged. Sternotomy wires are present. Enteric catheter extending below the diaphragm, its tip overlying the expected region of the body of the stomach. Summary of Events:   Known to Dr. Domingo Ing with PMH severe asthma/COPD, DAYAN on NIV     ED 12/10 HTN emergency and met encephalopathy admitted to ICU   12/12 transfer out of ICU  12/13: 2L/NIV overnight     Assessment/Plan:  Severe asthma/COPD without exacerbation  Continue duo-nebs, Brovana and Pulmicort   DAYAN on NIV  AVAPS 16/500/40%/8 at HS and prn for naps   Hypoxia   2L NC with home O2 as needed  Wean to maintain pulse ox > 90%  Ambulatory pulse ox prior to discharge   Add nasal saline with c/o dryness from O2. Hypertensive encephalopathy resolved   DVT prophylaxis on Lovenox         Electronically signed by PATRIZIA Stringer CNP on 12/13/2023 at 7:53 AM    Seen and examined, agree with above. Breathing doing fine and tolerating NIV other than the noise. Continue HS NIV and O2 as is. Asthma/ copd is baseline. Okay to home after the 3 days of ertepenem.

## 2023-12-13 NOTE — PROGRESS NOTES
Occupational Therapy  Attempted OT eval, however, pt recently transported off unit for testing. Will attempt Assessment next date.   Thank you for this referral.  Bud Griffiths, MOT, OTR/L  # [phone]

## 2023-12-13 NOTE — PROGRESS NOTES
Per IV team, pt is not a candidate for a midline or PICC line as there is no suitable vessel. If pt needs to continue IV atbx, she will need a TLC or Port. Dr. Kia Nickerson notified via [email or web address]. Neurology also messaged to see if IV keppra can be switched to PO as the pt has no IV access. 1500: Per Dr. Enrique Lui note, pt needs one last dose of IV Invanz and then will be monitored off atbx. Dr. Ben Lee confirmed that pt will need to receive last dose. Consult general surgery ordered for TLC per verbal order.     Electronically signed by Luisa Guaman RN on 12/13/2023 at 2:09 PM

## 2023-12-13 NOTE — PROGRESS NOTES
Onesimo Lees was ordered linaclotide (Linzess) which is a nonformulary medication. This medication will need to be supplied by the patient as the pharmacy does not carry this non-formulary medication. If the medication has not been administered by 1400 on the following day from the time the order was placed, a pharmacist will follow-up with the nurse of the patient to assess the capability of the patient to bring in the medication. If it is determined that the patient cannot supply the medication and it is not available to be dispensed from the pharmacy, the provider will be notified.

## 2023-12-13 NOTE — PROGRESS NOTES
Perfect served the Dr-PT loss IV access she was unable to receive her potassium phosphate 20 mmol in sodium chloride 0.9 % 500 mL IVPB, and Keppra. She's a hard stick, if we can have these in pill form that would be great.  Thank you

## 2023-12-13 NOTE — CARE COORDINATION
CM Update: Plan at discharge is Home with [facility], they are following. Orders are on chart. DME order noted for [employer], choiced for [facility], will have delivered day of discharge. Awaiting EEG today. Wilhelmenia Dust continues. Talbot Cramp continues.  CM to follow (TF)          Nitza Aranda, [facility]  Case Management  [phone]

## 2023-12-13 NOTE — PROCEDURES
[address] Report    MRN: [id number]  PATIENT NAME: Scot Pozo  DATE OF REPORT: 2023    DATE OF SERVICE: 2023  PHYSICIAN NAME: Mamta Guillermo DO  Referring Physician: Dr Drake Lucero      Patient's : 1955  Patient's Age: 75 y.o. Gender: female    PROCEDURE: Routine EEG with video      Clinical Interpretation: This was a normal study during waking and drowsiness. No seizures or epileptiform discharges were noted during this study. ____________________________  Electronically signed by: Mamta Guillermo DO, 2023 9:46 AM      Patient Clinical Information   Reason for Study: Patient undergoing evaluation for altered mental status  Patient State: Somnolent  Primary neurological diagnosis: Altered mental status  Primary indication for monitoring: Diagnosis of nonconvulsive seizures    Pertinent Medications and Treatments    thiamine     folic acid     levetiracetam     diphenhydramine     Lorazepam       Sedatives administered: No  Intubated: No  Pharmacological paralytic: No    Reporting Period  Start of Study: 921, 2023   End of Study:  949, 2023       EEG Description  Digital video and scalp EEG monitoring was performed using the standard protocol for this laboratory. Scalp electrodes were applied in the international 10/20 system. Multiple digital montage arrangements were utilized for evaluation. EKG and video were recorded. Background:      Occipital rhythm (posterior dominant rhythm or PDR): Present  Frequency: 10 Hz  Voltage: low to medium  Organization: fair   Reactivity to eye opening/closure: fair     Drowsiness: Present - normal  Sleep: Absent    Technical and Activation Procedures:  Hyperventilation: Not done  Photic stimulation: Done - physiologic driving noted  Reactivity to stimulation: Yes    Abnormalities:    I. Seizures? No    II. Rhythmic or Periodic Patterns? No    III. Other Abnormalities?   No

## 2023-12-13 NOTE — PROGRESS NOTES
[facility] Progress Note    SYNOPSIS: Patient admitted on 12/10/2023 for Hypertensive emergency    Ms. Shan Renee, a 75y.o. year old female  who  has a past medical history of Asthma, Atherosclerosis of native artery of left leg with rest pain ([address]), CAD (coronary artery disease), Cellulitis and abscess of trunk, Cerebellar infarct (HCC), Chronic back pain, Chronic kidney disease, Chronic systolic congestive heart failure ([address]), Chronic venous insufficiency, COPD (chronic obstructive pulmonary disease) ([address]), COVID-19, Depression, Diabetes mellitus ([address]), Diabetic neuropathy ([address]), Diverticulosis, Fatty liver, Glaucoma, open angle, Hepatic encephalopathy ([address]), Hiatal hernia, History of blood transfusion, Hyperlipidemia, Hyperplastic colon polyp, Hypertension, Incontinence, Liver mass, Morbid obesity ([address]), Movement disorder, Myocardial infarction ([address]), O2 dependent, Osteoarthritis, generalized, Pain in both lower legs, Peripheral vascular disease ([address]), Pneumonia, Pulmonary edema, PVD (peripheral vascular disease) with claudication ([address]), Sleep apnea, Status post peripheral artery angioplasty, Tobacco abuse, Tobacco abuse, Tobacco dependence, Tubular adenoma polyp of rectum, Urinary tract infection due to ESBL Klebsiella, and Ventral hernia. Patient presented from [facility] for altered mental status. Patient was admitted with UTI. Patient has been on Rocephin. Had been doing well then became significantly hypertensive and started on a cardene drip. At that point, she had worsening confusion. CTH negative. CT cervical spine showed acute compression fracture or subluxation of the cervical spine. CT chest showed no acute displaced rib fractures. Small right pleural effusion. CTAP showed no osseous abnormality. MRA/MRI showed small chronic lacunar infarctions in the right cerebellar hemisphere. No major arterial stenosis or occlusion.     diminutive right vertebral artery, which may be due REVIEWED DAILY    Infusion Medications    sodium chloride 10 mL/hr at 12/10/23 2230    dextrose       Scheduled Medications    linaclotide  290 mcg Oral QAM AC    [Held by provider] carvedilol  12.5 mg Oral BID WC    insulin glargine  7 Units SubCUTAneous Nightly    insulin lispro  3 Units SubCUTAneous TID WC    ertapenem (INVanz) IVPB  1,000 mg IntraVENous Q24H    insulin regular  5 Units SubCUTAneous Once    white petrolatum   Topical BID    polyethylene glycol  17 g Oral BID    docusate sodium  200 mg Oral BID    sennosides  10 mL Per NG tube BID    nystatin  5 mL Oral 4x Daily    thiamine  100 mg IntraVENous Daily    folic acid  1 mg IntraVENous Daily    sacubitril-valsartan  1 tablet Oral BID    aspirin  81 mg Oral Daily    [Held by provider] baclofen  10 mg Oral BID    cetirizine  10 mg Oral Daily    [Held by provider] DULoxetine  60 mg Oral Daily    fluticasone  2 spray Each Nostril Daily    [Held by provider] gabapentin  100 mg Oral BID    [Held by provider] montelukast  10 mg Oral Nightly    [Held by provider] oxybutynin  10 mg Oral Daily    [Held by provider] traZODone  100 mg Oral Nightly    sodium chloride flush  5-40 mL IntraVENous 2 times per day    budesonide  0.5 mg Nebulization BID RT    And    arformoterol tartrate  15 mcg Nebulization BID RT    enoxaparin  40 mg SubCUTAneous Daily    lidocaine  1 patch TransDERmal Daily    miconazole   Topical BID    levETIRAcetam  750 mg IntraVENous Q12H    pantoprazole (PROTONIX) 40 mg in sodium chloride (PF) 0.9 % 10 mL injection  40 mg IntraVENous Daily    cloNIDine  0.3 mg Oral TID    hydrALAZINE  50 mg Oral 3 times per day    atorvastatin  10 mg Oral Daily    ipratropium 0.5 mg-albuterol 2.5 mg  1 Dose Inhalation Q4H WA RT     PRN Meds: sodium chloride, hydrALAZINE, white petrolatum **AND** white petrolatum, sodium chloride flush, sodium chloride, ondansetron **OR** ondansetron, acetaminophen **OR** acetaminophen, dextrose bolus **OR** dextrose bolus,

## 2023-12-13 NOTE — PROGRESS NOTES
Comprehensive Nutrition Assessment    Type and Reason for Visit:  Initial, Positive Nutrition Screen    Nutrition Recommendations/Plan:     Continue NPO (Advance nutrition as feasible)     Malnutrition Assessment:  Malnutrition Status: At risk for malnutrition (12/13/23 1317)    Context:  Acute Illness     Findings of the 6 clinical characteristics of malnutrition:  Energy Intake:  Mild decrease in energy intake  Weight Loss:  No significant weight loss     Body Fat Loss:  No significant body fat loss     Muscle Mass Loss:  No significant muscle mass loss    Fluid Accumulation:  No significant fluid accumulation     Strength:  Not Performed    Nutrition Assessment:    Pt admit transferred from [facility] w/ Acute Metabolic Encephalopathy 2/2 UTI & HTN Urgency. PMHx DM, CAD/CABG, COPD, CKD, CVA, PVD, Fatty Liver. Noted +nutrition screen for wt loss however not reflected in EMR hx. NPO status d/t abd pain pending US (Ping Ing removed & pt transferred to stepdown unit). Gen sx states okay for diet. Will monitor nutrition progression & provide recs as needed. Nutrition Related Findings:    mentation improving, fluid bal WNL, hypoactive BS, +1 gen edema, hx gastroparesis     Wound Type: Partial Thickness (ruptured blister)       Current Nutrition Intake & Therapies:    Average Meal Intake: NPO     Diet NPO    Anthropometric Measures:  Height: 157.5 cm (5' 2\")  Ideal Body Weight (IBW): 110 lbs (50 kg)    Admission Body Weight: 88.4 kg (194 lb 14.2 oz) (12/6 first measured)  Current Body Weight: 88.4 kg (194 lb 14.2 oz) (12/6 first measured as CBW elevated), 177.2 % IBW.     Current BMI (kg/m2): 35.6  Usual Body Weight:  (EMR measured wt ranges 178-186lb within past year, no  wt loss reflected)                       BMI Categories: Obese Class 2 (BMI 35.0 -39.9)    Estimated Daily Nutrient Needs:  Energy Requirements Based On: Formula  Weight Used for Energy Requirements: Admission  Energy (kcal/day): MSJ 1368 x 1.2 SF= 1436-8314  Weight Used for Protein Requirements: Ideal  Protein (g/day): 1.3-1.5 g/kg IBW; 65-75  Method Used for Fluid Requirements: 1 ml/kcal  Fluid (ml/day): 0684-0973    Nutrition Diagnosis:   Inadequate oral intake related to altered GI function as evidenced by NPO or clear liquid status due to medical condition    Nutrition Interventions:   Nutrition Education/Counseling: Education not appropriate  Coordination of Nutrition Care: Continue to monitor while inpatient      Goals:    Specify Other Goals: Nutrition Progression    Nutrition Monitoring and Evaluation:      Food/Nutrient Intake Outcomes: Diet Advancement/Tolerance  Physical Signs/Symptoms Outcomes: Biochemical Data, Nutrition Focused Physical Findings, Skin, Weight, GI Status, Fluid Status or Edema    Discharge Planning:     Too soon to determine     Loraine Engel RD, LD  Contact: Ext [phone]

## 2023-12-13 NOTE — PROGRESS NOTES
[facility]  [facility]    Progress Note       HISTORY OF PRESENT ILLNESS:   69-year-old female with past medical history of asthma, atherosclerosis, CAD, cellulitis, cerebellar infarct, CKD chronic systolic CHF, chronic venous insufficiency, COPD, COVID, depression, DM, HLD, HTN presented to [facility] with worsening confusion and hypertensive emergency. She was treated for E. coli UTI with ceftriaxone at [facility].  EEG showed nonspecific encephalopathy. MRI brain showed no acute abnormality. 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 Transfer to [facility] ICU for evaluation by neurology and further management of altered mental status. Patient is currently on 4 L nasal cannula and Cardene drip. ID consulted for suspected meningitis.   Past Medical History:        Diagnosis Date    Asthma     Atherosclerosis of native artery of left leg with rest pain ([address]) 11/09/2018    CAD (coronary artery disease) 2011    Cellulitis and abscess of trunk 04/14/2015    Cerebellar infarct ([address]) 04/03/2019    Remote, rt. cerebellum, head CT scan, 1/9/19    Chronic back pain     Chronic kidney disease     Chronic systolic congestive heart failure ([address]) 10/04/2013    Chronic venous insufficiency 10/11/2017    COPD (chronic obstructive pulmonary disease) ([address])     COVID-19     Depression     Diabetes mellitus ([address])     Diabetic neuropathy ([address])     Diverticulosis     Fatty liver 01/08/2016    per US    Glaucoma, open angle 02/01/2016    Mild-OU    Hepatic encephalopathy ([address]) 02/07/2016    resolved    Hiatal hernia     History of blood transfusion     Hyperlipidemia     Hyperplastic colon polyp     Hypertension     Incontinence     Liver mass     Morbid obesity ([address])     Movement disorder     Myocardial infarction ([address]) 2011    O2 dependent 09/16/2021    with bipap 3 liters    Osteoarthritis, generalized     Pain in diminutive  Hypertensive emergency on Cardene  E. coli UTI treated with ceftriaxone for 5 days, E. coli intermediate with cefazolin and resistant to Zosyn    Plan:    Initially planned for CSF studies but that was cancelled by neurology as mental status is improved. Invanz last dose today. Then monitor off antibiotics. Blood cultures no growth so far  ID will continue to follow    Thank you for having us see this patient in consultation. I will be discussing this case with the treating physicians.       Electronically signed by Erendira Garza MD on 12/13/2023 at 2:44 PM

## 2023-12-13 NOTE — PROGRESS NOTES
Physical Therapy    PT order received and medical chart reviewed 12/13. Pt being transported off unit for tests. Will re-attempt as able. Thank you.     Tatum Jennings, PT, DPT  [id number]

## 2023-12-14 VITALS
HEART RATE: 53 BPM | RESPIRATION RATE: 18 BRPM | WEIGHT: 202.16 LBS | SYSTOLIC BLOOD PRESSURE: 138 MMHG | DIASTOLIC BLOOD PRESSURE: 81 MMHG | BODY MASS INDEX: 37.2 KG/M2 | OXYGEN SATURATION: 98 % | HEIGHT: 62 IN | TEMPERATURE: 97.2 F

## 2023-12-14 LAB
EKG ATRIAL RATE: 46 BPM
EKG P AXIS: 55 DEGREES
EKG P-R INTERVAL: 152 MS
EKG Q-T INTERVAL: 698 MS
EKG QRS DURATION: 134 MS
EKG QTC CALCULATION (BAZETT): 610 MS
EKG R AXIS: 53 DEGREES
EKG T AXIS: 51 DEGREES
EKG VENTRICULAR RATE: 46 BPM
GLUCOSE BLD-MCNC: 205 MG/DL (ref 74–99)

## 2023-12-14 PROCEDURE — 97165 OT EVAL LOW COMPLEX 30 MIN: CPT

## 2023-12-14 PROCEDURE — 6360000002 HC RX W HCPCS: Performed by: INTERNAL MEDICINE

## 2023-12-14 PROCEDURE — 6370000000 HC RX 637 (ALT 250 FOR IP)

## 2023-12-14 PROCEDURE — 97530 THERAPEUTIC ACTIVITIES: CPT

## 2023-12-14 PROCEDURE — 97161 PT EVAL LOW COMPLEX 20 MIN: CPT

## 2023-12-14 PROCEDURE — 6360000002 HC RX W HCPCS: Performed by: NURSE PRACTITIONER

## 2023-12-14 PROCEDURE — 6360000002 HC RX W HCPCS: Performed by: STUDENT IN AN ORGANIZED HEALTH CARE EDUCATION/TRAINING PROGRAM

## 2023-12-14 PROCEDURE — 97535 SELF CARE MNGMENT TRAINING: CPT

## 2023-12-14 PROCEDURE — 94660 CPAP INITIATION&MGMT: CPT

## 2023-12-14 PROCEDURE — 6370000000 HC RX 637 (ALT 250 FOR IP): Performed by: INTERNAL MEDICINE

## 2023-12-14 PROCEDURE — 94640 AIRWAY INHALATION TREATMENT: CPT

## 2023-12-14 PROCEDURE — 99239 HOSP IP/OBS DSCHRG MGMT >30: CPT | Performed by: INTERNAL MEDICINE

## 2023-12-14 PROCEDURE — 2700000000 HC OXYGEN THERAPY PER DAY

## 2023-12-14 PROCEDURE — 82962 GLUCOSE BLOOD TEST: CPT

## 2023-12-14 PROCEDURE — 6370000000 HC RX 637 (ALT 250 FOR IP): Performed by: STUDENT IN AN ORGANIZED HEALTH CARE EDUCATION/TRAINING PROGRAM

## 2023-12-14 PROCEDURE — 93010 ELECTROCARDIOGRAM REPORT: CPT | Performed by: INTERNAL MEDICINE

## 2023-12-14 RX ORDER — LEVETIRACETAM 500 MG/1
500 TABLET ORAL 2 TIMES DAILY
Status: DISCONTINUED | OUTPATIENT
Start: 2023-12-14 | End: 2023-12-14 | Stop reason: HOSPADM

## 2023-12-14 RX ORDER — HYDRALAZINE HYDROCHLORIDE 100 MG/1
100 TABLET, FILM COATED ORAL EVERY 8 HOURS SCHEDULED
Qty: 90 TABLET | Refills: 0 | Status: SHIPPED | OUTPATIENT
Start: 2023-12-14

## 2023-12-14 RX ORDER — LEVETIRACETAM 500 MG/1
500 TABLET ORAL 2 TIMES DAILY
Qty: 5 TABLET | Refills: 0 | Status: SHIPPED | OUTPATIENT
Start: 2023-12-14 | End: 2023-12-22

## 2023-12-14 RX ADMIN — ENOXAPARIN SODIUM 40 MG: 100 INJECTION SUBCUTANEOUS at 10:28

## 2023-12-14 RX ADMIN — SACUBITRIL AND VALSARTAN 1 TABLET: 97; 103 TABLET, FILM COATED ORAL at 10:31

## 2023-12-14 RX ADMIN — INSULIN LISPRO 3 UNITS: 100 INJECTION, SOLUTION INTRAVENOUS; SUBCUTANEOUS at 10:29

## 2023-12-14 RX ADMIN — DOCUSATE SODIUM 200 MG: 100 CAPSULE, LIQUID FILLED ORAL at 10:30

## 2023-12-14 RX ADMIN — ARFORMOTEROL TARTRATE 15 MCG: 15 SOLUTION RESPIRATORY (INHALATION) at 09:26

## 2023-12-14 RX ADMIN — CLONIDINE HYDROCHLORIDE 0.3 MG: 0.2 TABLET ORAL at 10:30

## 2023-12-14 RX ADMIN — ASPIRIN 81 MG: 81 TABLET, CHEWABLE ORAL at 10:33

## 2023-12-14 RX ADMIN — POLYETHYLENE GLYCOL 3350 17 G: 17 POWDER, FOR SOLUTION ORAL at 10:29

## 2023-12-14 RX ADMIN — ACETAMINOPHEN 650 MG: 325 TABLET ORAL at 05:38

## 2023-12-14 RX ADMIN — LEVETIRACETAM 500 MG: 500 TABLET, FILM COATED ORAL at 12:59

## 2023-12-14 RX ADMIN — THIAMINE HYDROCHLORIDE 100 MG: 100 INJECTION, SOLUTION INTRAMUSCULAR; INTRAVENOUS at 10:33

## 2023-12-14 RX ADMIN — INSULIN LISPRO 3 UNITS: 100 INJECTION, SOLUTION INTRAVENOUS; SUBCUTANEOUS at 12:58

## 2023-12-14 RX ADMIN — BUDESONIDE INHALATION 500 MCG: 0.5 SUSPENSION RESPIRATORY (INHALATION) at 09:26

## 2023-12-14 RX ADMIN — ATORVASTATIN CALCIUM 10 MG: 10 TABLET, FILM COATED ORAL at 10:33

## 2023-12-14 RX ADMIN — IPRATROPIUM BROMIDE AND ALBUTEROL SULFATE 1 DOSE: 2.5; .5 SOLUTION RESPIRATORY (INHALATION) at 09:26

## 2023-12-14 RX ADMIN — CETIRIZINE HYDROCHLORIDE 10 MG: 10 TABLET, FILM COATED ORAL at 10:30

## 2023-12-14 RX ADMIN — LEVETIRACETAM 500 MG: 100 INJECTION INTRAVENOUS at 00:44

## 2023-12-14 NOTE — DISCHARGE SUMMARY
Hospital Medicine Discharge Summary    Patient ID: Marquis Saldivar      Patient's PCP: Amari Bui, APRN - CNP    Admit Date: 12/10/2023     Discharge Date:   12/14/23    Admitting Physician: Jami Chaudhry DO     Discharge Physician: Corrina Harper MD     Discharge Diagnoses: Active Hospital Problems    Diagnosis Date Noted    Hypertensive emergency [I16.1] 04/12/2022    Urinary tract infection [N39.0]        The patient was seen and examined on day of discharge and this discharge summary is in conjunction with any daily progress note from day of discharge. Hospital Course:       Ms. Marquis Saldivar, a 75y.o. year old female  who  has a past medical history of Asthma, Atherosclerosis of native artery of left leg with rest pain ([address]), CAD (coronary artery disease), Cellulitis and abscess of trunk, Cerebellar infarct (HCC), Chronic back pain, Chronic kidney disease, Chronic systolic congestive heart failure ([address]), Chronic venous insufficiency, COPD (chronic obstructive pulmonary disease) ([address]), COVID-19, Depression, Diabetes mellitus ([address]), Diabetic neuropathy ([address]), Diverticulosis, Fatty liver, Glaucoma, open angle, Hepatic encephalopathy ([address]), Hiatal hernia, History of blood transfusion, Hyperlipidemia, Hyperplastic colon polyp, Hypertension, Incontinence, Liver mass, Morbid obesity ([address]), Movement disorder, Myocardial infarction ([address]), O2 dependent, Osteoarthritis, generalized, Pain in both lower legs, Peripheral vascular disease ([address]), Pneumonia, Pulmonary edema, PVD (peripheral vascular disease) with claudication ([address]), Sleep apnea, Status post peripheral artery angioplasty, Tobacco abuse, Tobacco abuse, Tobacco dependence, Tubular adenoma polyp of rectum, Urinary tract infection due to ESBL Klebsiella, and Ventral hernia. Patient presented from [facility] for altered mental status. Patient was admitted with UTI. Patient has been on Rocephin.  Had been doing well then became

## 2023-12-14 NOTE — PROGRESS NOTES
air, HR ~ 63 seated, 98 standing       Functional Assessment:  AM-PAC Daily Activity Raw Score: 15/24     Initial Eval Status  Date: 12-14-23   Treatment Status  Date: STGs = LTGs  Time frame: 10-14 days   Feeding IND after set up    Seated in chair    NA   Grooming SUP/set up    Light ax seated in chair - Unable to tolerate ambulating to or standing at sink d/t dizziness/HA    Mod I  Standing at the Airphrame A/set up    Donning robe seated in chair    Mod I     LB Dressing Mod A/set up    Donning undergarments seated/standing w/ Min A for safety w/ standing balance during task w/ [employer]  Pt ed for [employer], use of adaptive equip    Mod I     Bathing Mod A/set up    SUP/Set up UB - seated in chair  Mod A - cat-care/bowel hygiene in standing  Pt ed re: Benefits of use of Shower chair    Mod I      Toileting Mod A    Mod A - cat-care/bowel hygiene in standing w/ Min A for safety w/ standing balance during task    Mod I     Bed Mobility  Supine to sit: NT   Sit to supine:  NT     Supine to sit: IND  Sit to supine: IND     Functional Transfers Mod A-> Min A    Standing from Armed chair ~ 4x w/ seated rest break b/t trials  Pt ed for safety/hand placement    Mod I     Functional Mobility Min A w/ [employer]    Short distance at b/s 3x w/ seated rest break b/t trials - limited by Moderate Dizziness/HA - see vitals above  Pt ed for safety/improved safety awareness, walker safety    Mod I     Balance Sitting:     Static:  Remote SUP in chair    Dynamic:  Remote SUP w/ functional ax in armed chair     Standing:     Static:  Min A w/ [employer]    Dynamic:  Min A w/ functional ax/mobility w/ [employer]    Sitting:     Static:  IND    Dynamic:  IND w/ functional ax    Standing:     Static:  Mod I w/ AD PRN    Dynamic:  Mod I w/ functional ax/mobility w/ AD PRN   Activity Tolerance Fair    Sitting up in b/s chair for extended time w/ax  Only able to tolerate standing w/ light ax/mobility ~ 1-2 mins at a time - required seated rest break    Fair(+)   Visual/  Perceptual    Hearing: WNL   Glasses: Yes    WFL   Hearing Aids:  No               Hand Dominance: Right   AROM Strength Additional Info:    RUE  WFL WNL WNL ;   WNL FMC/dexterity noted during ADL tasks     LUE WFL WNL WNL ;    WNL FMC/dexterity noted during ADL tasks       Sensation:  Denies numbness or tingling Ed UEs   Tone: WFL Ed UEs   Edema: None Noted Ed UEs     Comments: Upon arrival, patient was found seated in armed chair. She was agreeable to participate in therapeutic ax. Several Family present during session. Received permission from RN prior to engaging pt in OT services. Educated pt on role of OT services. At the end of the session, patient was properly positioned in b/s chair. Call light and phone within reach, all lines and tubes intact. Oriented pt to call bell. Made all appropriate Environmental Modifications to facilitate pt's level of IND and safety. All needs met. Bed Alarm activated. Notified RN of pt's position and performance. Overall patient demonstrated decreased independence and safety during completion of ADL/functional transfer/mobility tasks. Pt would benefit from continued skilled OT to increase safety and independence with completion of ADL/IADL tasks for functional independence and quality of life.     Treatment: OT treatment provided this date includes:   Instruction/training on safety and adapted techniques for completion of ADLs, use of DME/AD/Adaptive equip;  within precautions   Instruction/training on safe functional mobility/transfer techniques, use of DME/AD;  within precautions      Instruction/training on energy conservation techs (EC)/Work Simplification/PLB for completion of ADLs:     Neuromuscular Reeducation to facilitate balance/righting reactions for increased function with ADLs:    Skilled positioning/alignment to maximize Pt's safety and ability to safely and [facility] interact w/ his/her environment, maximize respiratory status  Activity tolerance - Sitting/Standing to improve endurance w/ functional ax   Cognitive retraining -  Oriented pt to current Date, Place and Situation; Cues for safety/safety awareness, sequencing, problem solving    Skilled monitoring of Vitals during session and pt's response to tx ax        Pt/family ed re: benefits of participate in post-acute therapy program    Consulted RN, PT, Family, Physician     Made all appropriate Environmental Modifications to facilitate pt's level of IND and safety. Recommendations for Continued Participation in OT services during Hospitalization and at D/C     Pt and/or Family verbalized/demonstrated a Good(-) understanding of education provided. Will Review PRN. Rehab Potential: Good(-) for established goals     Patient / Family Goal: Not stated at this time      Patient and/or family were instructed on functional diagnosis, prognosis/goals and OT plan of care. Demonstrated Good(-) understanding. Eval Complexity: Low    Time In: 1032  Time Out: 1111  Total Treatment Time: 24 minutes    Min Units   OT Eval Low 97165  X  1   OT Eval Medium 46220      OT Eval High 75120      OT Re-Eval [id number]       Therapeutic Ex 04403       Therapeutic Activities 55929       ADL/Self Care 68100  24  2   Orthotic Management 65918       Manual 13491     Neuro Re-Ed 77749       Non-Billable Time              Evaluation Time additionally includes thorough review of current medical information, gathering information on past medical history/social history and prior level of function, completion of standardized testing/informal observation of tasks, assessment of data and education on plan of care and goals.             Laura Epps, MOT, OTR/L  # [id number]

## 2023-12-14 NOTE — PROGRESS NOTES
Date: 12/13/2023    Time: 10:17 PM    Patient Placed On BIPAP/CPAP/ Non-Invasive Ventilation? Yes    If no must comment. Facial area red/color change? No           If YES are Blister/Lesion present? No   If yes must notify nursing staff  BIPAP/CPAP skin barrier?   Yes    Skin barrier type:mepilexlite       Comments:        Eunice Iniguez RCP

## 2023-12-14 NOTE — PROGRESS NOTES
Physical Therapy Initial Assessment     Name: Papi Lees  : 1955  MRN: [id number]      Date of Service: 2023    Evaluating PT:  Cristian Hollingsworth PT, DPT [id number]    Room #:  9063/7259-B  Diagnosis:  Hypertensive emergency [I16.1]  PMHx/PSHx:    Past Medical History:   Diagnosis Date    Asthma     Atherosclerosis of native artery of left leg with rest pain ([address]) 2018    CAD (coronary artery disease)     Cellulitis and abscess of trunk 2015    Cerebellar infarct ([address]) 2019    Remote, rt. cerebellum, head CT scan, 19    Chronic back pain     Chronic kidney disease     Chronic systolic congestive heart failure ([address]) 10/04/2013    Chronic venous insufficiency 10/11/2017    COPD (chronic obstructive pulmonary disease) ([address])     COVID-19     Depression     Diabetes mellitus ([address])     Diabetic neuropathy ([address])     Diverticulosis     Fatty liver 2016    per US    Glaucoma, open angle 2016    Mild-OU    Hepatic encephalopathy ([address]) 2016    resolved    Hiatal hernia     History of blood transfusion     Hyperlipidemia     Hyperplastic colon polyp     Hypertension     Incontinence     Liver mass     Morbid obesity ([address])     Movement disorder     Myocardial infarction ([address])     O2 dependent 2021    with bipap 3 liters    Osteoarthritis, generalized     Pain in both lower legs 10/11/2017    Peripheral vascular disease ([address])     Pneumonia 2014    Pulmonary edema     resolved    PVD (peripheral vascular disease) with claudication ([address]) 2017    Sleep apnea     uses BI PAP    Status post peripheral artery angioplasty 10/31/2019    Tobacco abuse     Tobacco abuse 10/11/2017    Tobacco dependence 2022    Tubular adenoma polyp of rectum     Urinary tract infection due to ESBL Klebsiella 2017    Ventral hernia      Procedure/Surgery:  none this admission   Reason for admission: Hypertensive emergency [I16.1]  Precautions:  fall risk, HTN and assess need for appropriate assistive device  [x] Stair Training in preparation for safe discharge home and/or into the community   [x] Positioning to prevent skin breakdown and contractures  [] Safety and Education Training   [] Patient/Caregiver Education   [] HEP  [] Other     PT long term treatment goals are located in above grid    Frequency of treatments: 2-5x/week x 1-2 weeks. Time in  1124  Time out  1203    Total Treatment Time  25 minutes     Evaluation Time includes thorough review of current medical information, gathering information on past medical history/social history and prior level of function, completion of standardized testing/informal observation of tasks, assessment of data and education on plan of care and goals.     CPT codes:  [x] Low Complexity PT evaluation 15443  [] Moderate Complexity PT evaluation 46077  [] High Complexity PT evaluation 21802  [] PT Re-evaluation 48089  [] Gait training 08789 -- minutes  [] Manual therapy [id number] -- minutes  [x] Therapeutic activities 10789 25 minutes  [] Therapeutic exercises 01760 -- minutes  [] Neuromuscular reeducation 60602 -- minutes     Giovanni Stephens, PT, DPT  [id number]

## 2023-12-14 NOTE — PROGRESS NOTES
[facility]  [facility]    Progress Note       HISTORY OF PRESENT ILLNESS:   69-year-old female with past medical history of asthma, atherosclerosis, CAD, cellulitis, cerebellar infarct, CKD chronic systolic CHF, chronic venous insufficiency, COPD, COVID, depression, DM, HLD, HTN presented to [facility] with worsening confusion and hypertensive emergency. She was treated for E. coli UTI with ceftriaxone at [facility].  EEG showed nonspecific encephalopathy. MRI brain showed no acute abnormality. 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 Transfer to [facility] ICU for evaluation by neurology and further management of altered mental status. Patient is currently on 4 L nasal cannula and Cardene drip. ID consulted for suspected meningitis.   Past Medical History:        Diagnosis Date    Asthma     Atherosclerosis of native artery of left leg with rest pain ([address]) 11/09/2018    CAD (coronary artery disease) 2011    Cellulitis and abscess of trunk 04/14/2015    Cerebellar infarct ([address]) 04/03/2019    Remote, rt. cerebellum, head CT scan, 1/9/19    Chronic back pain     Chronic kidney disease     Chronic systolic congestive heart failure ([address]) 10/04/2013    Chronic venous insufficiency 10/11/2017    COPD (chronic obstructive pulmonary disease) ([address])     COVID-19     Depression     Diabetes mellitus ([address])     Diabetic neuropathy ([address])     Diverticulosis     Fatty liver 01/08/2016    per US    Glaucoma, open angle 02/01/2016    Mild-OU    Hepatic encephalopathy ([address]) 02/07/2016    resolved    Hiatal hernia     History of blood transfusion     Hyperlipidemia     Hyperplastic colon polyp     Hypertension     Incontinence     Liver mass     Morbid obesity ([address])     Movement disorder     Myocardial infarction ([address]) 2011    O2 dependent 09/16/2021    with bipap 3 liters    Osteoarthritis, generalized     Pain in both lower legs 10/11/2017    Peripheral vascular disease ([address])     Pneumonia 01/26/2014    Pulmonary edema     resolved    PVD (peripheral vascular disease) with claudication ([address]) 08/30/2017    Sleep apnea     uses BI PAP    Status post peripheral artery angioplasty 10/31/2019    Tobacco abuse     Tobacco abuse 10/11/2017    Tobacco dependence 6/30/2022    Tubular adenoma polyp of rectum     Urinary tract infection due to ESBL Klebsiella 03/08/2017    Ventral hernia      Past Surgical History:        Procedure Laterality Date    ANGIOPLASTY  11/13/2018    Dr. Fannie Macdonald & athrectomy L SFA & Popliteal    BREAST SURGERY  2000    bilateral reduction    BRONCHIAL BRUSH BIOPSY  1/25/2013    Dr Rosalia Gilmore  04/20/2015    Dr. Bere Aranda  12/2011     DR. Jaz Cobb,  follows Dr Chris Reeves  11/15/2013    Dr Lu Fortune    COLONOSCOPY  12/23/2016    Dr Kristi Madrid adenoma & hyperplastic polyps, melanosis coli (repeat one year 12/2017)    CORONARY ARTERY BYPASS GRAFT      ECHO COMPLETE  10/9/2013         ENDOSCOPY, COLON, DIAGNOSTIC  04/18/2017    GALLBLADDER SURGERY      gallstones removed, [facility] 8/2017    HYSTERECTOMY (CERVIX STATUS UNKNOWN)      JOINT REPLACEMENT  2011    LEFT KNEE    KNEE SURGERY      left knee replacement    LARYNGOSCOPY N/A 4/4/2022    DIRECT LARYNGOSCOPY WITH BIOPSY performed by Jannet Loyd DO at [address] Bilateral 10/4/2023    DIRECT LARYNGOSCOPY AND BIOPSY RIGHT FALSE VOCAL CORD performed by Jannet Loyd DO at [facility] Left [id number]    LIVER BIOPSY  1/8/2016     E's    POLYSOMNOGRAPHY  1/2016    [facility]    UPPER GASTROINTESTINAL ENDOSCOPY  12/20/12    UPPER GASTROINTESTINAL ENDOSCOPY  12/23/2016    Dr Tyrone Cordero GASTROINTESTINAL ENDOSCOPY  02/08/2017     Current Medications:   Scheduled Meds:   levETIRAcetam  500 mg Oral BID    linaclotide peripheral      CBC+dif:  Recent Labs     12/12/23  0554 12/13/23  0447   WBC 9.5 7.6   HGB 9.8* 9.9*   HCT 30.6* 31.2*   MCV 99.4 99.7    293     Lab Results   Component Value Date    CRP 1.1 (H) 08/24/2022    CRP 2.7 (H) 04/19/2021    CRP 0.6 (H) 06/16/2020     No results found for: \"CRPHS\"  Lab Results   Component Value Date    SEDRATE 60 (H) 08/24/2022    SEDRATE 45 (H) 06/16/2020    SEDRATE 11 04/17/2019     Lab Results   Component Value Date    ALT 19 12/12/2023    AST 7 12/12/2023    ALKPHOS 139 (H) 12/12/2023    BILITOT 0.3 12/12/2023     Lab Results   Component Value Date/Time     12/13/2023 04:47 AM    K 4.0 12/13/2023 04:47 AM    K 4.6 03/14/2023 12:24 PM     12/13/2023 04:47 AM    CO2 24 12/13/2023 04:47 AM    BUN 33 12/13/2023 04:47 AM    CREATININE 1.3 12/13/2023 04:47 AM    GFRAA 54 10/17/2022 04:43 PM    LABGLOM 44 12/13/2023 04:47 AM    GLUCOSE 205 12/13/2023 04:47 AM    GLUCOSE 181 12/01/2011 02:11 PM    PROT 5.9 12/12/2023 12:10 PM    LABALBU 3.0 12/12/2023 12:10 PM    LABALBU 5.2 12/01/2011 02:11 PM    CALCIUM 8.3 12/13/2023 04:47 AM    BILITOT 0.3 12/12/2023 12:10 PM    ALKPHOS 139 12/12/2023 12:10 PM    AST 7 12/12/2023 12:10 PM    ALT 19 12/12/2023 12:10 PM       Lab Results   Component Value Date/Time    PROTIME 14.0 12/11/2023 04:54 AM    PROTIME 12.6 04/22/2011 05:41 PM    INR 1.3 12/11/2023 04:54 AM       Lab Results   Component Value Date/Time    TSH 1.13 12/09/2023 09:30 PM       Lab Results   Component Value Date/Time    NITRITE positive 03/03/2020 01:30 PM    COLORU Yellow 12/12/2023 12:10 PM    PHUR 6.0 12/12/2023 12:10 PM    LABCAST RARE 01/23/2014 06:35 PM    WBCUA 0 TO 5 12/12/2023 12:10 PM    WBCUA 0-1 04/25/2011 08:10 PM    RBCUA 0 TO 2 12/12/2023 12:10 PM    RBCUA 0-1 01/25/2014 10:40 PM    MUCUS Present 01/04/2019 08:55 PM    YEAST FEW 09/19/2019 01:18 PM    BACTERIA TRACE 12/09/2023 09:30 PM    CLARITYU SL CLOUDY 05/16/2023 02:14 PM    SPECGRAV 1.020

## 2023-12-14 NOTE — CARE COORDINATION
CM Update: Discharge order noted. Pt returning Home with [facility]. Orders on chart. DME order placed for wheeled walker. Call placed to [facility], they will deliver to bedside.  (TF)      Veronique Montez  Case Management  [phone]

## 2023-12-14 NOTE — PLAN OF CARE
Problem: Safety - Adult  Goal: Free from fall injury  Outcome: Progressing     Problem: Chronic Conditions and Co-morbidities  Goal: Patient's chronic conditions and co-morbidity symptoms are monitored and maintained or improved  Outcome: Progressing     Problem: Discharge Planning  Goal: Discharge to home or other facility with appropriate resources  Outcome: Progressing     Problem: Pain  Goal: Verbalizes/displays adequate comfort level or baseline comfort level  Outcome: Progressing     Problem: Skin/Tissue Integrity  Goal: Absence of new skin breakdown  Description: 1. Monitor for areas of redness and/or skin breakdown  2. Assess vascular access sites hourly  3. Every 4-6 hours minimum:  Change oxygen saturation probe site  4. Every 4-6 hours:  If on nasal continuous positive airway pressure, respiratory therapy assess nares and determine need for appliance change or resting period. Outcome: Progressing     Problem: ABCDS Injury Assessment  Goal: Absence of physical injury  Outcome: Progressing     Problem: Confusion  Goal: Confusion, delirium, dementia, or psychosis is improved or at baseline  Description: INTERVENTIONS:  1. Assess for possible contributors to thought disturbance, including medications, impaired vision or hearing, underlying metabolic abnormalities, dehydration, psychiatric diagnoses, and notify attending LIP  2. [place] high risk fall precautions, as indicated  3. Provide frequent short contacts to provide reality reorientation, refocusing and direction  4. Decrease environmental stimuli, including noise as appropriate  5. Monitor and intervene to maintain adequate nutrition, hydration, elimination, sleep and activity  6. If unable to ensure safety without constant attention obtain sitter and review sitter guidelines with assigned personnel  7.  Initiate Psychosocial CNS and Spiritual Care consult, as indicated  Outcome: Progressing     Problem: Nutrition Deficit:  Goal: Optimize nutritional status  Outcome: Progressing

## 2023-12-15 LAB
MICROORGANISM SPEC CULT: NORMAL
MICROORGANISM SPEC CULT: NORMAL
SERVICE CMNT-IMP: NORMAL
SERVICE CMNT-IMP: NORMAL
SPECIMEN DESCRIPTION: NORMAL
SPECIMEN DESCRIPTION: NORMAL
WEST NILE IGG: 0.07 IV
WEST NILE IGM: 0 IV

## 2023-12-21 LAB
HSV 1 SUBTYPE BY PCR: NOT DETECTED
HSV 2 SUBTYPE BY PCR: NOT DETECTED
HSV SOURCE: NORMAL

## 2023-12-27 NOTE — PROGRESS NOTES
Physician Progress Note      Annmarie Nathan  [facility] #:                  [id number]  :                       1955  ADMIT DATE:       12/10/2023 5:59 PM  [facility] DATE:        2023 2:02 PM  RESPONDING  PROVIDER #:        Jessica Oden MD          QUERY TEXT:    Dear Provider,    Patient admitted with hypertensive emergency. Noted documentation of \"Chronic   Hypoxic Respiratory Failure\" in the medical record. In order to support the   diagnosis of chronic hypoxic respiratory failure, please include additional   clinical indicators in your documentation. Or please document if the   diagnosis of chronic hypoxic respiratory failure has been ruled out after   further study. The medical record reflects the following:  Risk Factors: COPD,CHF, Currently Smoking, No home 02 listed on Home   medication list  Clinical Indicators: Documented Chronic Hypoxic Respiratory Failure. Oxygen   dependence noted in the HPI  Treatment: Oxygen supplementation, Bipap    Thank you,  Rupal Alaniz RN  Clinical Documentation Improvement  [phone]  Options provided:  -- Chronic hypoxic respiratory failure present as evidenced by, Please   document evidence. -- Chronic hypoxic respiratory failure was ruled out  -- Other - I will add my own diagnosis  -- Disagree - Not applicable / Not valid  -- Disagree - Clinically unable to determine / Unknown  -- Refer to Clinical Documentation Reviewer    PROVIDER RESPONSE TEXT:    Provider is clinically unable to determine a response to this query.     Query created by: Rupal Alaniz on 2023 3:30 PM      Electronically signed by:  Jessica Oden MD 2023 11:53 AM

## 2024-01-31 ENCOUNTER — APPOINTMENT (OUTPATIENT)
Dept: GENERAL RADIOLOGY | Age: 69
End: 2024-01-31
Payer: MEDICARE

## 2024-01-31 ENCOUNTER — APPOINTMENT (OUTPATIENT)
Dept: CT IMAGING | Age: 69
End: 2024-01-31
Attending: EMERGENCY MEDICINE
Payer: MEDICARE

## 2024-01-31 ENCOUNTER — HOSPITAL ENCOUNTER (OUTPATIENT)
Age: 69
Setting detail: OBSERVATION
Discharge: HOME | End: 2024-01-31
Attending: EMERGENCY MEDICINE | Admitting: INTERNAL MEDICINE
Payer: MEDICARE

## 2024-01-31 ENCOUNTER — APPOINTMENT (OUTPATIENT)
Dept: CT IMAGING | Age: 69
End: 2024-01-31
Payer: MEDICARE

## 2024-01-31 VITALS
OXYGEN SATURATION: 97 % | TEMPERATURE: 98.8 F | DIASTOLIC BLOOD PRESSURE: 84 MMHG | SYSTOLIC BLOOD PRESSURE: 132 MMHG | HEART RATE: 98 BPM | RESPIRATION RATE: 24 BRPM

## 2024-01-31 DIAGNOSIS — L03.313 CELLULITIS OF CHEST WALL: ICD-10-CM

## 2024-01-31 DIAGNOSIS — R07.9 CHEST PAIN, UNSPECIFIED TYPE: Primary | ICD-10-CM

## 2024-01-31 LAB
ALBUMIN SERPL-MCNC: 3.8 G/DL (ref 3.5–5.2)
ALP SERPL-CCNC: 77 U/L (ref 35–104)
ALT SERPL-CCNC: 11 U/L (ref 0–32)
ANION GAP SERPL CALCULATED.3IONS-SCNC: 10 MMOL/L (ref 7–16)
AST SERPL-CCNC: 10 U/L (ref 0–31)
BACTERIA URNS QL MICRO: ABNORMAL
BASOPHILS # BLD: 0.02 K/UL (ref 0–0.2)
BASOPHILS NFR BLD: 0 % (ref 0–2)
BILIRUB SERPL-MCNC: 0.2 MG/DL (ref 0–1.2)
BILIRUB UR QL STRIP: NEGATIVE
BNP SERPL-MCNC: 1282 PG/ML (ref 0–125)
BUN SERPL-MCNC: 23 MG/DL (ref 6–23)
CALCIUM SERPL-MCNC: 9.1 MG/DL (ref 8.6–10.2)
CHLORIDE SERPL-SCNC: 110 MMOL/L (ref 98–107)
CLARITY UR: CLEAR
CO2 SERPL-SCNC: 22 MMOL/L (ref 22–29)
COLOR UR: YELLOW
CREAT SERPL-MCNC: 1.7 MG/DL (ref 0.5–1)
D DIMER: 326 NG/ML DDU (ref 0–232)
EOSINOPHIL # BLD: 0.09 K/UL (ref 0.05–0.5)
EOSINOPHILS RELATIVE PERCENT: 1 % (ref 0–6)
EPI CELLS #/AREA URNS HPF: ABNORMAL /HPF
ERYTHROCYTE [DISTWIDTH] IN BLOOD BY AUTOMATED COUNT: 12.1 % (ref 11.5–15)
FLUAV RNA RESP QL NAA+PROBE: NOT DETECTED
FLUBV RNA RESP QL NAA+PROBE: NOT DETECTED
GFR SERPL CREATININE-BSD FRML MDRD: 33 ML/MIN/1.73M2
GLUCOSE BLD-MCNC: 94 MG/DL (ref 74–99)
GLUCOSE SERPL-MCNC: 137 MG/DL (ref 74–99)
GLUCOSE UR STRIP-MCNC: NEGATIVE MG/DL
HCT VFR BLD AUTO: 34.1 % (ref 34–48)
HGB BLD-MCNC: 11.2 G/DL (ref 11.5–15.5)
HGB UR QL STRIP.AUTO: NEGATIVE
IMM GRANULOCYTES # BLD AUTO: 0.03 K/UL (ref 0–0.58)
IMM GRANULOCYTES NFR BLD: 0 % (ref 0–5)
KETONES UR STRIP-MCNC: NEGATIVE MG/DL
LEUKOCYTE ESTERASE UR QL STRIP: NEGATIVE
LYMPHOCYTES NFR BLD: 1.15 K/UL (ref 1.5–4)
LYMPHOCYTES RELATIVE PERCENT: 14 % (ref 20–42)
MCH RBC QN AUTO: 31.7 PG (ref 26–35)
MCHC RBC AUTO-ENTMCNC: 32.8 G/DL (ref 32–34.5)
MCV RBC AUTO: 96.6 FL (ref 80–99.9)
MONOCYTES NFR BLD: 0.49 K/UL (ref 0.1–0.95)
MONOCYTES NFR BLD: 6 % (ref 2–12)
NEUTROPHILS NFR BLD: 78 % (ref 43–80)
NEUTS SEG NFR BLD: 6.41 K/UL (ref 1.8–7.3)
NITRITE UR QL STRIP: NEGATIVE
PH UR STRIP: 5.5 [PH] (ref 5–9)
PLATELET # BLD AUTO: 287 K/UL (ref 130–450)
PMV BLD AUTO: 10 FL (ref 7–12)
POTASSIUM SERPL-SCNC: 4 MMOL/L (ref 3.5–5)
PROT SERPL-MCNC: 6.4 G/DL (ref 6.4–8.3)
PROT UR STRIP-MCNC: ABNORMAL MG/DL
RBC # BLD AUTO: 3.53 M/UL (ref 3.5–5.5)
RBC #/AREA URNS HPF: ABNORMAL /HPF
SARS-COV-2 RNA RESP QL NAA+PROBE: NOT DETECTED
SODIUM SERPL-SCNC: 142 MMOL/L (ref 132–146)
SOURCE: NORMAL
SP GR UR STRIP: 1.01 (ref 1–1.03)
SPECIMEN DESCRIPTION: NORMAL
TROPONIN I SERPL HS-MCNC: 31 NG/L (ref 0–9)
TROPONIN I SERPL HS-MCNC: 32 NG/L (ref 0–9)
UROBILINOGEN UR STRIP-ACNC: 0.2 EU/DL (ref 0–1)
WBC #/AREA URNS HPF: ABNORMAL /HPF
WBC OTHER # BLD: 8.2 K/UL (ref 4.5–11.5)

## 2024-01-31 PROCEDURE — 85379 FIBRIN DEGRADATION QUANT: CPT

## 2024-01-31 PROCEDURE — 80053 COMPREHEN METABOLIC PANEL: CPT

## 2024-01-31 PROCEDURE — 96375 TX/PRO/DX INJ NEW DRUG ADDON: CPT

## 2024-01-31 PROCEDURE — 87086 URINE CULTURE/COLONY COUNT: CPT

## 2024-01-31 PROCEDURE — 71045 X-RAY EXAM CHEST 1 VIEW: CPT

## 2024-01-31 PROCEDURE — 96374 THER/PROPH/DIAG INJ IV PUSH: CPT

## 2024-01-31 PROCEDURE — G0378 HOSPITAL OBSERVATION PER HR: HCPCS

## 2024-01-31 PROCEDURE — 71275 CT ANGIOGRAPHY CHEST: CPT

## 2024-01-31 PROCEDURE — 81001 URINALYSIS AUTO W/SCOPE: CPT

## 2024-01-31 PROCEDURE — 99285 EMERGENCY DEPT VISIT HI MDM: CPT

## 2024-01-31 PROCEDURE — 6360000002 HC RX W HCPCS

## 2024-01-31 PROCEDURE — 83880 ASSAY OF NATRIURETIC PEPTIDE: CPT

## 2024-01-31 PROCEDURE — 82962 GLUCOSE BLOOD TEST: CPT

## 2024-01-31 PROCEDURE — 84484 ASSAY OF TROPONIN QUANT: CPT

## 2024-01-31 PROCEDURE — 87636 SARSCOV2 & INF A&B AMP PRB: CPT

## 2024-01-31 PROCEDURE — 6360000004 HC RX CONTRAST MEDICATION: Performed by: RADIOLOGY

## 2024-01-31 PROCEDURE — 85025 COMPLETE CBC W/AUTO DIFF WBC: CPT

## 2024-01-31 PROCEDURE — 93005 ELECTROCARDIOGRAM TRACING: CPT | Performed by: EMERGENCY MEDICINE

## 2024-01-31 PROCEDURE — 74176 CT ABD & PELVIS W/O CONTRAST: CPT

## 2024-01-31 PROCEDURE — 6370000000 HC RX 637 (ALT 250 FOR IP): Performed by: EMERGENCY MEDICINE

## 2024-01-31 PROCEDURE — 2580000003 HC RX 258

## 2024-01-31 RX ORDER — ASPIRIN 81 MG/1
81 TABLET, CHEWABLE ORAL DAILY
Status: CANCELLED | OUTPATIENT
Start: 2024-01-31

## 2024-01-31 RX ORDER — POLYETHYLENE GLYCOL 3350 17 G/17G
17 POWDER, FOR SOLUTION ORAL DAILY PRN
Status: CANCELLED | OUTPATIENT
Start: 2024-01-31

## 2024-01-31 RX ORDER — HYDROCODONE BITARTRATE AND ACETAMINOPHEN 5; 325 MG/1; MG/1
1 TABLET ORAL EVERY 6 HOURS PRN
Qty: 12 TABLET | Refills: 0 | Status: SHIPPED | OUTPATIENT
Start: 2024-01-31 | End: 2024-02-03

## 2024-01-31 RX ORDER — DOCUSATE SODIUM 100 MG/1
200 CAPSULE, LIQUID FILLED ORAL NIGHTLY
Status: CANCELLED | OUTPATIENT
Start: 2024-01-31

## 2024-01-31 RX ORDER — SODIUM CHLORIDE 0.9 % (FLUSH) 0.9 %
5-40 SYRINGE (ML) INJECTION PRN
Status: CANCELLED | OUTPATIENT
Start: 2024-01-31

## 2024-01-31 RX ORDER — SODIUM CHLORIDE 0.9 % (FLUSH) 0.9 %
5-40 SYRINGE (ML) INJECTION EVERY 12 HOURS SCHEDULED
Status: CANCELLED | OUTPATIENT
Start: 2024-01-31

## 2024-01-31 RX ORDER — LANOLIN ALCOHOL/MO/W.PET/CERES
3 CREAM (GRAM) TOPICAL NIGHTLY
Status: CANCELLED | OUTPATIENT
Start: 2024-01-31

## 2024-01-31 RX ORDER — ISOSORBIDE MONONITRATE 30 MG/1
60 TABLET, EXTENDED RELEASE ORAL DAILY
Status: CANCELLED | OUTPATIENT
Start: 2024-01-31

## 2024-01-31 RX ORDER — FENTANYL CITRATE 50 UG/ML
50 INJECTION, SOLUTION INTRAMUSCULAR; INTRAVENOUS ONCE
Status: COMPLETED | OUTPATIENT
Start: 2024-01-31 | End: 2024-01-31

## 2024-01-31 RX ORDER — SIMVASTATIN 20 MG
20 TABLET ORAL NIGHTLY
Status: CANCELLED | OUTPATIENT
Start: 2024-01-31

## 2024-01-31 RX ORDER — MONTELUKAST SODIUM 10 MG/1
10 TABLET ORAL NIGHTLY
Status: CANCELLED | OUTPATIENT
Start: 2024-01-31

## 2024-01-31 RX ORDER — ASPIRIN 81 MG/1
324 TABLET, CHEWABLE ORAL ONCE
Status: COMPLETED | OUTPATIENT
Start: 2024-01-31 | End: 2024-01-31

## 2024-01-31 RX ORDER — MAGNESIUM SULFATE IN WATER 40 MG/ML
2000 INJECTION, SOLUTION INTRAVENOUS PRN
Status: CANCELLED | OUTPATIENT
Start: 2024-01-31

## 2024-01-31 RX ORDER — SODIUM CHLORIDE 9 MG/ML
INJECTION, SOLUTION INTRAVENOUS PRN
Status: CANCELLED | OUTPATIENT
Start: 2024-01-31

## 2024-01-31 RX ORDER — DULOXETIN HYDROCHLORIDE 60 MG/1
60 CAPSULE, DELAYED RELEASE ORAL DAILY
Status: CANCELLED | OUTPATIENT
Start: 2024-01-31

## 2024-01-31 RX ORDER — ONDANSETRON 2 MG/ML
4 INJECTION INTRAMUSCULAR; INTRAVENOUS EVERY 6 HOURS PRN
Status: CANCELLED | OUTPATIENT
Start: 2024-01-31

## 2024-01-31 RX ORDER — FAMOTIDINE 20 MG/1
40 TABLET, FILM COATED ORAL DAILY
Status: CANCELLED | OUTPATIENT
Start: 2024-01-31

## 2024-01-31 RX ORDER — DIPHENHYDRAMINE HYDROCHLORIDE 50 MG/ML
50 INJECTION INTRAMUSCULAR; INTRAVENOUS ONCE
Status: COMPLETED | OUTPATIENT
Start: 2024-01-31 | End: 2024-01-31

## 2024-01-31 RX ORDER — TRAZODONE HYDROCHLORIDE 50 MG/1
100 TABLET ORAL NIGHTLY
Status: CANCELLED | OUTPATIENT
Start: 2024-01-31

## 2024-01-31 RX ORDER — POTASSIUM CHLORIDE 7.45 MG/ML
10 INJECTION INTRAVENOUS PRN
Status: CANCELLED | OUTPATIENT
Start: 2024-01-31

## 2024-01-31 RX ORDER — ENOXAPARIN SODIUM 100 MG/ML
40 INJECTION SUBCUTANEOUS DAILY
Status: CANCELLED | OUTPATIENT
Start: 2024-01-31

## 2024-01-31 RX ORDER — GABAPENTIN 100 MG/1
100 CAPSULE ORAL 2 TIMES DAILY
Status: CANCELLED | OUTPATIENT
Start: 2024-01-31

## 2024-01-31 RX ORDER — OMEPRAZOLE 20 MG/1
40 TABLET, DELAYED RELEASE ORAL DAILY
Status: CANCELLED | OUTPATIENT
Start: 2024-01-31

## 2024-01-31 RX ORDER — BUMETANIDE 1 MG/1
1 TABLET ORAL DAILY
Status: CANCELLED | OUTPATIENT
Start: 2024-01-31

## 2024-01-31 RX ORDER — BUDESONIDE AND FORMOTEROL FUMARATE DIHYDRATE 160; 4.5 UG/1; UG/1
2 AEROSOL RESPIRATORY (INHALATION) 2 TIMES DAILY
Status: CANCELLED | OUTPATIENT
Start: 2024-01-31

## 2024-01-31 RX ORDER — FLUTICASONE FUROATE AND VILANTEROL 100; 25 UG/1; UG/1
1 POWDER RESPIRATORY (INHALATION) DAILY
Status: CANCELLED | OUTPATIENT
Start: 2024-01-31

## 2024-01-31 RX ORDER — HYDRALAZINE HYDROCHLORIDE 25 MG/1
100 TABLET, FILM COATED ORAL EVERY 8 HOURS SCHEDULED
Status: CANCELLED | OUTPATIENT
Start: 2024-01-31

## 2024-01-31 RX ORDER — DOXYCYCLINE HYCLATE 100 MG
100 TABLET ORAL 2 TIMES DAILY
Qty: 20 TABLET | Refills: 0 | Status: SHIPPED | OUTPATIENT
Start: 2024-01-31 | End: 2024-02-10

## 2024-01-31 RX ORDER — ONDANSETRON 4 MG/1
4 TABLET, ORALLY DISINTEGRATING ORAL EVERY 8 HOURS PRN
Status: CANCELLED | OUTPATIENT
Start: 2024-01-31

## 2024-01-31 RX ORDER — POTASSIUM CHLORIDE 20 MEQ/1
40 TABLET, EXTENDED RELEASE ORAL PRN
Status: CANCELLED | OUTPATIENT
Start: 2024-01-31

## 2024-01-31 RX ORDER — FLUTICASONE PROPIONATE 50 MCG
2 SPRAY, SUSPENSION (ML) NASAL DAILY
Status: CANCELLED | OUTPATIENT
Start: 2024-01-31

## 2024-01-31 RX ADMIN — FENTANYL CITRATE 50 MCG: 50 INJECTION INTRAMUSCULAR; INTRAVENOUS at 16:53

## 2024-01-31 RX ADMIN — ASPIRIN 324 MG: 81 TABLET, CHEWABLE ORAL at 14:47

## 2024-01-31 RX ADMIN — WATER 125 MG: 1 INJECTION INTRAMUSCULAR; INTRAVENOUS; SUBCUTANEOUS at 16:50

## 2024-01-31 RX ADMIN — DIPHENHYDRAMINE HYDROCHLORIDE 50 MG: 50 INJECTION INTRAMUSCULAR; INTRAVENOUS at 16:52

## 2024-01-31 RX ADMIN — IOPAMIDOL 75 ML: 755 INJECTION, SOLUTION INTRAVENOUS at 17:23

## 2024-01-31 ASSESSMENT — PAIN - FUNCTIONAL ASSESSMENT: PAIN_FUNCTIONAL_ASSESSMENT: NONE - DENIES PAIN

## 2024-01-31 NOTE — ED NOTES
.  Radiology Procedure Waiver   Name: Steph Gonzáles  : 1955  MRN: [id number]    Date:  24    Time: 4:12 PM EST    Benefits of immediately proceeding with radiology exam(s) without pre-testing outweigh the risks or are not indicated as specified below and therefore the following is/are being waived:    [x] Benefits of immediate radiology exam(s) outweigh any risk.                                               OR    Pre-exam testing is not indicated for the following reason(s):  [] Pregnancy test   [] Patients LMP on-time and regular.   [] Patient had Tubal Ligation or has other Contraception Device.   [] Patient  is Menopausal or Premenarcheal.    [] Patient had Full or Partial Hysterectomy.    [x] Protocol for CT contrast allegry   [x] Patient has tolerated well previously   [] Patient does not have a true allergy    [] MRI Questionnaire     [] BUN/Creatinine   [] Patient age w/no hx of renal dysfunction.   [] Patient on Dialysis.   [] Recent Normal Labs.  Electronically signed by Ed Dudley MD on 24 at 4:12 PM EST

## 2024-01-31 NOTE — ED PROVIDER NOTES
HPI:  1/31/24, Time: 2:02 PM VALERIA Gonzáles is a 68 y.o. female presenting to the ED for chest pain beginning 3 days ago.  Pain was initially intermittent but has been constant since last night.  She describes it as a substernal heaviness which is nonradiating with associated exertional shortness of breath.  No leg swelling.  History of CAD status post CABG and stent placement.  Follows with Dr. Boateng with [facility] cardiology.  Also reports right-sided abdominal pain.  No nausea, vomiting, or diarrhea.  No dysuria.  No fevers.  Also states he is having a new cough productive of yellow sputum.  History of COPD.  Denies history of CAD/MI.  She is not anticoagulated.    --------------------------------------------- PAST HISTORY ---------------------------------------------  Past Medical History:  has a past medical history of Asthma, Atherosclerosis of native artery of left leg with rest pain (HCC), CAD (coronary artery disease), Cellulitis and abscess of trunk, Cerebellar infarct (HCC), Chronic back pain, Chronic kidney disease, Chronic systolic congestive heart failure (HCC), Chronic venous insufficiency, COPD (chronic obstructive pulmonary disease) ([facility] COVID-19, Depression, Diabetes mellitus (HCC), Diabetic neuropathy (HCC), Diverticulosis, Fatty liver, Glaucoma, open angle, Hepatic encephalopathy (HCC), Hiatal hernia, History of blood transfusion, Hyperlipidemia, Hyperplastic colon polyp, Hypertension, Incontinence, Liver mass, Morbid obesity (HCC), Movement disorder, Myocardial infarction (HCC), O2 dependent, Osteoarthritis, generalized, Pain in both lower legs, Peripheral vascular disease (HCC), Pneumonia, Pulmonary edema, PVD (peripheral vascular disease) with claudication (HCC), Sleep apnea, Status post peripheral artery angioplasty, Tobacco abuse, Tobacco abuse, Tobacco dependence, Tubular adenoma polyp of rectum, Urinary tract infection due to ESBL Klebsiella, and Ventral hernia.    Past

## 2024-01-31 NOTE — ED PROVIDER NOTES
..        [facility] EMERGENCY DEPARTMENT  EMERGENCY DEPARTMENT ENCOUNTER        Pt Name: Steph Gonzáles  MRN: [id number]  Birthdate 1955  Date of evaluation: 1/31/2024  Provider: Ed Dudley MD  PCP: Vince Beatty APRN - CNP  Note Started: 2:25 PM EST 1/31/24    CHIEF COMPLAINT       Chief Complaint   Patient presents with    Chest Pain     Patient c/o chest discomfort x 3 days with some hypertension       HISTORY OF PRESENT ILLNESS: 1 or more Elements   History From: Patient.    Limitations to history : None    Steph Gonzáles is a 68 y.o. female with a history of CABG surgery in 2011, coronary artery disease, CKD, COPD, diabetes mellitus, hypertension, hyperlipidemia, CHF who presents to the ED with complaints of chest pain.  Patient states that she started having left-sided chest pain radiating to her right arm for the past 3 days.  Patient states that her symptoms are sharp in nature and comes and goes.  She denies any shortness of breath.  She is on 3 L nasal cannula at home on her baseline.  Patient also states that she is having right lower quadrant abdominal pain since yesterday.  She denies any nausea.  She denies any fever, headache, constipation, diarrhea, any symptoms or any other active complaints now.    Nursing Notes were all reviewed and agreed with or any disagreements were addressed in the HPI.    ROS:   Pertinent positives and negatives are stated within HPI, all other systems reviewed and are negative.    --------------------------------------------- PAST HISTORY ---------------------------------------------  Past Medical History:  has a past medical history of Asthma, Atherosclerosis of native artery of left leg with rest pain (HCC), CAD (coronary artery disease), Cellulitis and abscess of trunk, Cerebellar infarct (HCC), Chronic back pain, Chronic kidney disease, Chronic systolic congestive heart failure (HCC), Chronic venous insufficiency, COPD      EKG:  This EKG is signed and interpreted by me.    Rate: 111  Rhythm: Sinus tachycardia.  Interpretation: non-specific EKG, T wave changes in anterior and also inferior leads.  Comparison: changes compared to previous EKG  Interpreted by me      ------------------------- NURSING NOTES AND VITALS REVIEWED ---------------------------   The nursing notes within the ED encounter and vital signs as below have been reviewed by myself.  BP (!) 148/63   Pulse 98   Temp 98.8 °F (37.1 °C)   Resp 14   LMP 01/01/1990   SpO2 98%   Oxygen Saturation Interpretation: Normal    The patient’s available past medical records and past encounters were reviewed.        ------------------------------ ED COURSE/MEDICAL DECISION MAKING----------------------  Medications   aspirin chewable tablet 324 mg (324 mg Oral Given 1/31/24 1447)   methylPREDNISolone sodium succ (SOLU-MEDROL) 125 mg in sterile water 2 mL injection (125 mg IntraVENous Given 1/31/24 1650)   diphenhydrAMINE (BENADRYL) injection 50 mg (50 mg IntraVENous Given 1/31/24 1652)   fentaNYL (SUBLIMAZE) injection 50 mcg (50 mcg IntraVENous Given 1/31/24 1653)   iopamidol (ISOVUE-370) 76 % injection 75 mL (75 mLs IntraVENous Given 1/31/24 1723)       Medical Decision Making/Differential Diagnosis:    CC/HPI Summary, Pertinent Physical Exam Findings, Social Determinants of health, Records Reviewed, DDx, testing done/not done, ED Course, Reassessment, disposition considerations/shared decision making with patient, consults, disposition:        Medical Decision Making:   I, Dr. Ed Dudley am the resident physician of record.      History From: Patient.    Limitations to history : None    Steph Gonzáles is a 68 y.o. female with a history of CABG surgery in 2011, coronary artery disease, CKD, COPD, diabetes mellitus, hypertension, hyperlipidemia, CHF who presents to the ED with complaints of chest pain.  Patient states that she started having left-sided chest pain radiating

## 2024-01-31 NOTE — ED NOTES
.  Radiology Procedure Waiver   Name: Steph Gonzáles  : 1955  MRN: [id number]    Date:  24    Time: 5:01 PM EST    Benefits of immediately proceeding with radiology exam(s) without pre-testing outweigh the risks or are not indicated as specified below and therefore the following is/are being waived:    [] Benefits of immediate radiology exam(s) outweigh any risk.                                               OR    Pre-exam testing is not indicated for the following reason(s):  [] Pregnancy test   [] Patients LMP on-time and regular.   [] Patient had Tubal Ligation or has other Contraception Device.   [] Patient  is Menopausal or Premenarcheal.    [] Patient had Full or Partial Hysterectomy.    [] Protocol for CT contrast allegry   [] Patient has tolerated well previously   [] Patient does not have a true allergy    [] MRI Questionnaire     [x] BUN/Creatinine   [] Patient age w/no hx of renal dysfunction.   [] Patient on Dialysis.   [] Recent Normal Labs.  Electronically signed by Ed Dudley MD on 24 at 5:01 PM EST

## 2024-02-01 LAB
EKG ATRIAL RATE: 111 BPM
EKG P AXIS: 72 DEGREES
EKG P-R INTERVAL: 162 MS
EKG Q-T INTERVAL: 366 MS
EKG QRS DURATION: 134 MS
EKG QTC CALCULATION (BAZETT): 497 MS
EKG R AXIS: 43 DEGREES
EKG T AXIS: -19 DEGREES
EKG VENTRICULAR RATE: 111 BPM
MICROORGANISM SPEC CULT: NO GROWTH
SPECIMEN DESCRIPTION: NORMAL

## 2024-02-01 PROCEDURE — 93010 ELECTROCARDIOGRAM REPORT: CPT | Performed by: INTERNAL MEDICINE

## 2024-02-07 NOTE — ED PROVIDER NOTES
Patient eval by hospice.  Hospitalist did not feel patient met inpatient criteria.  Hospitalist was to discharge patient patient was agreeable plan of care after speak with hospice.  Patient does have cellulitis on right chest.  Is not current antibiotics.  Will treat with Keflex as well.  Note the hospitalist did evaluate the patient in the emergency department, hospitalist was Dr. Ortiz.     Moustapha Saldana MD  02/07/24 6300     Here for transplant F/U with Naa Ricardo.  Only c/o is some occasional discomfort above incision and low mid abd and feels a bulge in RLQ.  Denies nausea and no problems with appetite.  Medications reviewed and is compliant with all medications. Assessment per MD.      Plan per MD:  For R hand pain - to see vascular MD, Dr Arauz, to evaluate fistula  Per PA, no hernia noted  Alk Phos isoenzyme with next blood work  F/U in 1 month

## 2024-02-08 ENCOUNTER — OFFICE VISIT (OUTPATIENT)
Dept: CARDIOLOGY CLINIC | Age: 69
End: 2024-02-08
Payer: MEDICARE

## 2024-02-08 VITALS
DIASTOLIC BLOOD PRESSURE: 60 MMHG | WEIGHT: 190.2 LBS | SYSTOLIC BLOOD PRESSURE: 100 MMHG | HEIGHT: 62 IN | HEART RATE: 66 BPM | BODY MASS INDEX: 35 KG/M2 | RESPIRATION RATE: 16 BRPM

## 2024-02-08 DIAGNOSIS — I10 ESSENTIAL HYPERTENSION: Primary | ICD-10-CM

## 2024-02-08 PROCEDURE — 1123F ACP DISCUSS/DSCN MKR DOCD: CPT | Performed by: INTERNAL MEDICINE

## 2024-02-08 PROCEDURE — 93000 ELECTROCARDIOGRAM COMPLETE: CPT | Performed by: INTERNAL MEDICINE

## 2024-02-08 PROCEDURE — 99214 OFFICE O/P EST MOD 30 MIN: CPT | Performed by: INTERNAL MEDICINE

## 2024-02-08 PROCEDURE — G8427 DOCREV CUR MEDS BY ELIG CLIN: HCPCS | Performed by: INTERNAL MEDICINE

## 2024-02-08 PROCEDURE — 4004F PT TOBACCO SCREEN RCVD TLK: CPT | Performed by: INTERNAL MEDICINE

## 2024-02-08 PROCEDURE — G8484 FLU IMMUNIZE NO ADMIN: HCPCS | Performed by: INTERNAL MEDICINE

## 2024-02-08 PROCEDURE — 3017F COLORECTAL CA SCREEN DOC REV: CPT | Performed by: INTERNAL MEDICINE

## 2024-02-08 PROCEDURE — 3074F SYST BP LT 130 MM HG: CPT | Performed by: INTERNAL MEDICINE

## 2024-02-08 PROCEDURE — 1090F PRES/ABSN URINE INCON ASSESS: CPT | Performed by: INTERNAL MEDICINE

## 2024-02-08 PROCEDURE — G8417 CALC BMI ABV UP PARAM F/U: HCPCS | Performed by: INTERNAL MEDICINE

## 2024-02-08 PROCEDURE — G8399 PT W/DXA RESULTS DOCUMENT: HCPCS | Performed by: INTERNAL MEDICINE

## 2024-02-08 PROCEDURE — 3078F DIAST BP <80 MM HG: CPT | Performed by: INTERNAL MEDICINE

## 2024-02-08 RX ORDER — HYDROCODONE BITARTRATE AND ACETAMINOPHEN 5; 325 MG/1; MG/1
1 TABLET ORAL EVERY 6 HOURS PRN
COMMUNITY

## 2024-02-08 RX ORDER — INSULIN LISPRO 100 [IU]/ML
INJECTION, SOLUTION INTRAVENOUS; SUBCUTANEOUS
COMMUNITY

## 2024-02-08 RX ORDER — MINOXIDIL 2.5 MG/1
2.5 TABLET ORAL 2 TIMES DAILY
COMMUNITY

## 2024-02-08 RX ORDER — CARVEDILOL 25 MG/1
25 TABLET ORAL 2 TIMES DAILY WITH MEALS
COMMUNITY
End: 2024-02-08 | Stop reason: ALTCHOICE

## 2024-02-08 RX ORDER — OXYBUTYNIN CHLORIDE 15 MG/1
15 TABLET, EXTENDED RELEASE ORAL DAILY
COMMUNITY

## 2024-02-08 RX ORDER — ISOSORBIDE MONONITRATE 60 MG/1
60 TABLET, EXTENDED RELEASE ORAL DAILY
Qty: 90 TABLET | Refills: 3 | Status: SHIPPED | OUTPATIENT
Start: 2024-02-08

## 2024-02-08 NOTE — PATIENT INSTRUCTIONS
She is on guideline directed medical therapy for heart failure with improved ejection fraction, continue Entresto 97/103  mg  p.o. twice daily,   and Hydralazine 100 mg po  3 times a day and clonidine 0.3 mg po times a day. Advised to discuss with Dr. Hunter to stop Minoxidil and start her back on Coreg.  BP low today  Continue Bumex 2 mg po daily.   She was strongly encouraged to adhere to strict cardiac diet and restrict daily salt intake to less than 2 g  Continue Imdur 60 mg p.o. daily.   She was advised again to stop smoking and told me that she does not want to quit.    Follow up in one 3 months.

## 2024-02-08 NOTE — PROGRESS NOTES
failure (HCC) 10/04/2013    Chronic venous insufficiency 10/11/2017    COPD (chronic obstructive pulmonary disease) (HCC)     COVID-19     Depression     Diabetes mellitus (HCC)     Diabetic neuropathy (HCC)     Diverticulosis     Fatty liver 01/08/2016    per US    Glaucoma, open angle 02/01/2016    Mild-OU    Hepatic encephalopathy (HCC) 02/07/2016    resolved    Hiatal hernia     History of blood transfusion     Hyperlipidemia     Hyperplastic colon polyp     Hypertension     Incontinence     Liver mass     Morbid obesity (HCC)     Movement disorder     Myocardial infarction (HCC) 2011    O2 dependent 09/16/2021    with bipap 3 liters    Osteoarthritis, generalized     Pain in both lower legs 10/11/2017    Peripheral vascular disease (HCC)     Pneumonia 01/26/2014    Pulmonary edema     resolved    PVD (peripheral vascular disease) with claudication (HCC) 08/30/2017    Sleep apnea     uses BI PAP    Status post peripheral artery angioplasty 10/31/2019    Tobacco abuse     Tobacco abuse 10/11/2017    Tobacco dependence 6/30/2022    Tubular adenoma polyp of rectum     Urinary tract infection due to ESBL Klebsiella 03/08/2017    Ventral hernia        Past Surgical History:  Past Surgical History:   Procedure Laterality Date    ANGIOPLASTY  11/13/2018    Dr. Gregg DCB & athrectomy L SFA & Popliteal    BREAST SURGERY  2000    bilateral reduction    BRONCHIAL BRUSH BIOPSY  1/25/2013    Dr Villegas    CARDIAC CATHETERIZATION  04/20/2015    Dr. Cardoso    CARDIAC SURGERY  12/2011     DR. ROCHA, [facility],  follows Dr Crawford    CHOLECYSTECTOMY  YRS AGO    OPEN    COLONOSCOPY  11/15/2013    Dr Borja    COLONOSCOPY  12/23/2016    Dr Borja-tubular adenoma & hyperplastic polyps, melanosis coli (repeat one year 12/2017)    CORONARY ARTERY BYPASS GRAFT      ECHO COMPLETE  10/9/2013         ENDOSCOPY, COLON, DIAGNOSTIC  04/18/2017    GALLBLADDER SURGERY      gallstones removed, [facility] 8/2017    HYSTERECTOMY (CERVIX STATUS UNKNOWN)

## 2024-02-09 ENCOUNTER — HOSPITAL ENCOUNTER (OUTPATIENT)
Age: 69
Discharge: HOME | End: 2024-02-09
Payer: MEDICARE

## 2024-02-09 LAB
25(OH)D3 SERPL-MCNC: 29 NG/ML (ref 30–100)
ALBUMIN SERPL-MCNC: 3.7 G/DL (ref 3.5–5.2)
ALP SERPL-CCNC: 79 U/L (ref 35–104)
ALT SERPL-CCNC: 9 U/L (ref 0–32)
ANION GAP SERPL CALCULATED.3IONS-SCNC: 10 MMOL/L (ref 7–16)
AST SERPL-CCNC: 8 U/L (ref 0–31)
BASOPHILS # BLD: 0.02 K/UL (ref 0–0.2)
BASOPHILS NFR BLD: 0 % (ref 0–2)
BILIRUB SERPL-MCNC: 0.2 MG/DL (ref 0–1.2)
BNP SERPL-MCNC: 518 PG/ML (ref 0–125)
BUN SERPL-MCNC: 32 MG/DL (ref 6–23)
CALCIUM SERPL-MCNC: 8.9 MG/DL (ref 8.6–10.2)
CHLORIDE SERPL-SCNC: 104 MMOL/L (ref 98–107)
CHOLEST SERPL-MCNC: 161 MG/DL
CO2 SERPL-SCNC: 24 MMOL/L (ref 22–29)
CREAT SERPL-MCNC: 1.4 MG/DL (ref 0.5–1)
EOSINOPHIL # BLD: 0.15 K/UL (ref 0.05–0.5)
EOSINOPHILS RELATIVE PERCENT: 3 % (ref 0–6)
ERYTHROCYTE [DISTWIDTH] IN BLOOD BY AUTOMATED COUNT: 12 % (ref 11.5–15)
GFR SERPL CREATININE-BSD FRML MDRD: 41 ML/MIN/1.73M2
GLUCOSE SERPL-MCNC: 155 MG/DL (ref 74–99)
HBA1C MFR BLD: 5.9 % (ref 4–5.6)
HCT VFR BLD AUTO: 35.1 % (ref 34–48)
HDLC SERPL-MCNC: 41 MG/DL
HGB BLD-MCNC: 11.3 G/DL (ref 11.5–15.5)
IMM GRANULOCYTES # BLD AUTO: <0.03 K/UL (ref 0–0.58)
IMM GRANULOCYTES NFR BLD: 0 % (ref 0–5)
LDLC SERPL CALC-MCNC: 96 MG/DL
LYMPHOCYTES NFR BLD: 0.71 K/UL (ref 1.5–4)
LYMPHOCYTES RELATIVE PERCENT: 13 % (ref 20–42)
MCH RBC QN AUTO: 32.2 PG (ref 26–35)
MCHC RBC AUTO-ENTMCNC: 32.2 G/DL (ref 32–34.5)
MCV RBC AUTO: 100 FL (ref 80–99.9)
MICROALBUMIN UR-MCNC: 53 MG/L (ref 0–19)
MONOCYTES NFR BLD: 0.26 K/UL (ref 0.1–0.95)
MONOCYTES NFR BLD: 5 % (ref 2–12)
NEUTROPHILS NFR BLD: 79 % (ref 43–80)
NEUTS SEG NFR BLD: 4.37 K/UL (ref 1.8–7.3)
PHOSPHATE SERPL-MCNC: 3.4 MG/DL (ref 2.5–4.5)
PLATELET # BLD AUTO: 244 K/UL (ref 130–450)
PMV BLD AUTO: 10 FL (ref 7–12)
POTASSIUM SERPL-SCNC: 4 MMOL/L (ref 3.5–5)
PROT SERPL-MCNC: 6.6 G/DL (ref 6.4–8.3)
PTH-INTACT SERPL-MCNC: 57.7 PG/ML (ref 15–65)
RBC # BLD AUTO: 3.51 M/UL (ref 3.5–5.5)
SODIUM SERPL-SCNC: 138 MMOL/L (ref 132–146)
TRIGL SERPL-MCNC: 120 MG/DL
TSH SERPL DL<=0.05 MIU/L-ACNC: 2.11 UIU/ML (ref 0.27–4.2)
VLDLC SERPL CALC-MCNC: 24 MG/DL
WBC OTHER # BLD: 5.5 K/UL (ref 4.5–11.5)

## 2024-02-09 PROCEDURE — 84443 ASSAY THYROID STIM HORMONE: CPT

## 2024-02-09 PROCEDURE — 85025 COMPLETE CBC W/AUTO DIFF WBC: CPT

## 2024-02-09 PROCEDURE — 83880 ASSAY OF NATRIURETIC PEPTIDE: CPT

## 2024-02-09 PROCEDURE — 80061 LIPID PANEL: CPT

## 2024-02-09 PROCEDURE — 36415 COLL VENOUS BLD VENIPUNCTURE: CPT

## 2024-02-09 PROCEDURE — 84100 ASSAY OF PHOSPHORUS: CPT

## 2024-02-09 PROCEDURE — 82306 VITAMIN D 25 HYDROXY: CPT

## 2024-02-09 PROCEDURE — 83970 ASSAY OF PARATHORMONE: CPT

## 2024-02-09 PROCEDURE — 82043 UR ALBUMIN QUANTITATIVE: CPT

## 2024-02-09 PROCEDURE — 83036 HEMOGLOBIN GLYCOSYLATED A1C: CPT

## 2024-02-09 PROCEDURE — 80053 COMPREHEN METABOLIC PANEL: CPT

## 2024-02-14 ENCOUNTER — APPOINTMENT (OUTPATIENT)
Dept: GENERAL RADIOLOGY | Age: 69
End: 2024-02-14
Payer: MEDICARE

## 2024-02-14 ENCOUNTER — OFFICE VISIT (OUTPATIENT)
Dept: ENT CLINIC | Age: 69
End: 2024-02-14
Payer: MEDICARE

## 2024-02-14 ENCOUNTER — APPOINTMENT (OUTPATIENT)
Dept: ULTRASOUND IMAGING | Age: 69
End: 2024-02-14
Payer: MEDICARE

## 2024-02-14 ENCOUNTER — HOSPITAL ENCOUNTER (INPATIENT)
Age: 69
LOS: 4 days | Discharge: HOME | DRG: 291 | End: 2024-02-18
Attending: INTERNAL MEDICINE | Admitting: INTERNAL MEDICINE
Payer: MEDICARE

## 2024-02-14 ENCOUNTER — HOSPITAL ENCOUNTER (EMERGENCY)
Age: 69
Discharge: ANOTHER HOSPITAL | End: 2024-02-14
Attending: STUDENT IN AN ORGANIZED HEALTH CARE EDUCATION/TRAINING PROGRAM
Payer: MEDICARE

## 2024-02-14 VITALS
DIASTOLIC BLOOD PRESSURE: 97 MMHG | RESPIRATION RATE: 16 BRPM | TEMPERATURE: 99.1 F | OXYGEN SATURATION: 97 % | SYSTOLIC BLOOD PRESSURE: 215 MMHG | HEART RATE: 73 BPM

## 2024-02-14 VITALS
HEART RATE: 62 BPM | HEIGHT: 62 IN | DIASTOLIC BLOOD PRESSURE: 76 MMHG | TEMPERATURE: 97.9 F | BODY MASS INDEX: 33.13 KG/M2 | SYSTOLIC BLOOD PRESSURE: 188 MMHG | OXYGEN SATURATION: 94 % | WEIGHT: 180 LBS

## 2024-02-14 DIAGNOSIS — I46.9 VENTRICULAR ASYSTOLIA (HCC): Primary | ICD-10-CM

## 2024-02-14 DIAGNOSIS — I44.1 MOBITZ II: ICD-10-CM

## 2024-02-14 DIAGNOSIS — I50.9 CONGESTIVE HEART FAILURE, UNSPECIFIED HF CHRONICITY, UNSPECIFIED HEART FAILURE TYPE (HCC): Primary | ICD-10-CM

## 2024-02-14 DIAGNOSIS — J38.1 REINKE'S EDEMA OF VOCAL FOLDS: Primary | ICD-10-CM

## 2024-02-14 DIAGNOSIS — R49.0 HOARSENESS: ICD-10-CM

## 2024-02-14 LAB
ALBUMIN SERPL-MCNC: 4 G/DL (ref 3.5–5.2)
ALP SERPL-CCNC: 86 U/L (ref 35–104)
ALT SERPL-CCNC: 10 U/L (ref 0–32)
ANION GAP SERPL CALCULATED.3IONS-SCNC: 12 MMOL/L (ref 7–16)
AST SERPL-CCNC: 11 U/L (ref 0–31)
BASOPHILS # BLD: 0.03 K/UL (ref 0–0.2)
BASOPHILS NFR BLD: 0 % (ref 0–2)
BILIRUB SERPL-MCNC: 0.3 MG/DL (ref 0–1.2)
BNP SERPL-MCNC: 2651 PG/ML (ref 0–125)
BUN SERPL-MCNC: 23 MG/DL (ref 6–23)
CALCIUM SERPL-MCNC: 9.3 MG/DL (ref 8.6–10.2)
CHLORIDE SERPL-SCNC: 108 MMOL/L (ref 98–107)
CO2 SERPL-SCNC: 22 MMOL/L (ref 22–29)
CREAT SERPL-MCNC: 1.3 MG/DL (ref 0.5–1)
EKG ATRIAL RATE: 64 BPM
EKG P AXIS: 64 DEGREES
EKG P-R INTERVAL: 176 MS
EKG Q-T INTERVAL: 452 MS
EKG QRS DURATION: 142 MS
EKG QTC CALCULATION (BAZETT): 466 MS
EKG R AXIS: 32 DEGREES
EKG T AXIS: -75 DEGREES
EKG VENTRICULAR RATE: 64 BPM
EOSINOPHIL # BLD: 0.15 K/UL (ref 0.05–0.5)
EOSINOPHILS RELATIVE PERCENT: 2 % (ref 0–6)
ERYTHROCYTE [DISTWIDTH] IN BLOOD BY AUTOMATED COUNT: 12.4 % (ref 11.5–15)
GFR SERPL CREATININE-BSD FRML MDRD: 45 ML/MIN/1.73M2
GLUCOSE BLD-MCNC: 135 MG/DL (ref 74–99)
GLUCOSE SERPL-MCNC: 186 MG/DL (ref 74–99)
HCT VFR BLD AUTO: 34.3 % (ref 34–48)
HGB BLD-MCNC: 11.2 G/DL (ref 11.5–15.5)
IMM GRANULOCYTES # BLD AUTO: 0.03 K/UL (ref 0–0.58)
IMM GRANULOCYTES NFR BLD: 0 % (ref 0–5)
LYMPHOCYTES NFR BLD: 1.41 K/UL (ref 1.5–4)
LYMPHOCYTES RELATIVE PERCENT: 18 % (ref 20–42)
MCH RBC QN AUTO: 31.5 PG (ref 26–35)
MCHC RBC AUTO-ENTMCNC: 32.7 G/DL (ref 32–34.5)
MCV RBC AUTO: 96.3 FL (ref 80–99.9)
MONOCYTES NFR BLD: 0.39 K/UL (ref 0.1–0.95)
MONOCYTES NFR BLD: 5 % (ref 2–12)
NEUTROPHILS NFR BLD: 75 % (ref 43–80)
NEUTS SEG NFR BLD: 5.97 K/UL (ref 1.8–7.3)
PLATELET # BLD AUTO: 225 K/UL (ref 130–450)
PMV BLD AUTO: 10.6 FL (ref 7–12)
POTASSIUM SERPL-SCNC: 4.2 MMOL/L (ref 3.5–5)
PROT SERPL-MCNC: 7 G/DL (ref 6.4–8.3)
RBC # BLD AUTO: 3.56 M/UL (ref 3.5–5.5)
SODIUM SERPL-SCNC: 142 MMOL/L (ref 132–146)
TROPONIN I SERPL HS-MCNC: 22 NG/L (ref 0–9)
TROPONIN I SERPL HS-MCNC: 23 NG/L (ref 0–9)
WBC OTHER # BLD: 8 K/UL (ref 4.5–11.5)

## 2024-02-14 PROCEDURE — 84484 ASSAY OF TROPONIN QUANT: CPT

## 2024-02-14 PROCEDURE — 3078F DIAST BP <80 MM HG: CPT | Performed by: OTOLARYNGOLOGY

## 2024-02-14 PROCEDURE — 1090F PRES/ABSN URINE INCON ASSESS: CPT | Performed by: OTOLARYNGOLOGY

## 2024-02-14 PROCEDURE — 85025 COMPLETE CBC W/AUTO DIFF WBC: CPT

## 2024-02-14 PROCEDURE — 6360000002 HC RX W HCPCS: Performed by: STUDENT IN AN ORGANIZED HEALTH CARE EDUCATION/TRAINING PROGRAM

## 2024-02-14 PROCEDURE — 93970 EXTREMITY STUDY: CPT

## 2024-02-14 PROCEDURE — 83880 ASSAY OF NATRIURETIC PEPTIDE: CPT

## 2024-02-14 PROCEDURE — 3017F COLORECTAL CA SCREEN DOC REV: CPT | Performed by: OTOLARYNGOLOGY

## 2024-02-14 PROCEDURE — 80053 COMPREHEN METABOLIC PANEL: CPT

## 2024-02-14 PROCEDURE — 1123F ACP DISCUSS/DSCN MKR DOCD: CPT | Performed by: OTOLARYNGOLOGY

## 2024-02-14 PROCEDURE — G8399 PT W/DXA RESULTS DOCUMENT: HCPCS | Performed by: OTOLARYNGOLOGY

## 2024-02-14 PROCEDURE — 71045 X-RAY EXAM CHEST 1 VIEW: CPT

## 2024-02-14 PROCEDURE — 99213 OFFICE O/P EST LOW 20 MIN: CPT | Performed by: OTOLARYNGOLOGY

## 2024-02-14 PROCEDURE — G8484 FLU IMMUNIZE NO ADMIN: HCPCS | Performed by: OTOLARYNGOLOGY

## 2024-02-14 PROCEDURE — 3077F SYST BP >= 140 MM HG: CPT | Performed by: OTOLARYNGOLOGY

## 2024-02-14 PROCEDURE — 93010 ELECTROCARDIOGRAM REPORT: CPT | Performed by: INTERNAL MEDICINE

## 2024-02-14 PROCEDURE — 4004F PT TOBACCO SCREEN RCVD TLK: CPT | Performed by: OTOLARYNGOLOGY

## 2024-02-14 PROCEDURE — 2060000000 HC ICU INTERMEDIATE R&B

## 2024-02-14 PROCEDURE — G8417 CALC BMI ABV UP PARAM F/U: HCPCS | Performed by: OTOLARYNGOLOGY

## 2024-02-14 PROCEDURE — G8427 DOCREV CUR MEDS BY ELIG CLIN: HCPCS | Performed by: OTOLARYNGOLOGY

## 2024-02-14 PROCEDURE — 82962 GLUCOSE BLOOD TEST: CPT

## 2024-02-14 PROCEDURE — 96374 THER/PROPH/DIAG INJ IV PUSH: CPT

## 2024-02-14 PROCEDURE — 6370000000 HC RX 637 (ALT 250 FOR IP): Performed by: STUDENT IN AN ORGANIZED HEALTH CARE EDUCATION/TRAINING PROGRAM

## 2024-02-14 PROCEDURE — 99285 EMERGENCY DEPT VISIT HI MDM: CPT

## 2024-02-14 PROCEDURE — 93005 ELECTROCARDIOGRAM TRACING: CPT | Performed by: STUDENT IN AN ORGANIZED HEALTH CARE EDUCATION/TRAINING PROGRAM

## 2024-02-14 RX ORDER — CLINDAMYCIN HYDROCHLORIDE 300 MG/1
300 CAPSULE ORAL 3 TIMES DAILY
COMMUNITY

## 2024-02-14 RX ORDER — HYDRALAZINE HYDROCHLORIDE 25 MG/1
100 TABLET, FILM COATED ORAL ONCE
Status: COMPLETED | OUTPATIENT
Start: 2024-02-14 | End: 2024-02-14

## 2024-02-14 RX ORDER — BUMETANIDE 0.25 MG/ML
1 INJECTION INTRAMUSCULAR; INTRAVENOUS ONCE
Status: COMPLETED | OUTPATIENT
Start: 2024-02-14 | End: 2024-02-14

## 2024-02-14 RX ORDER — CLONIDINE HYDROCHLORIDE 0.1 MG/1
0.3 TABLET ORAL ONCE
Status: COMPLETED | OUTPATIENT
Start: 2024-02-14 | End: 2024-02-14

## 2024-02-14 RX ADMIN — BUMETANIDE 1 MG: 0.25 INJECTION INTRAMUSCULAR; INTRAVENOUS at 18:08

## 2024-02-14 RX ADMIN — CLONIDINE HYDROCHLORIDE 0.3 MG: 0.1 TABLET ORAL at 21:11

## 2024-02-14 RX ADMIN — HYDRALAZINE HYDROCHLORIDE 100 MG: 25 TABLET, FILM COATED ORAL at 18:43

## 2024-02-14 ASSESSMENT — PAIN SCALES - GENERAL: PAINLEVEL_OUTOF10: 10

## 2024-02-14 NOTE — ED PROVIDER NOTES
[facility] EMERGENCY DEPARTMENT  EMERGENCY DEPARTMENT ENCOUNTER        Pt Name: Steph Gonzáles  MRN: [id number]  Birthdate 1955  Date of evaluation: 2/14/2024  Provider: Crys Lopez MD  PCP: Vince Beatty APRN - CNP  Note Started: 3:28 PM EST 2/14/24    HPI  68 y.o. female presenting for leg swelling.  Patient states that her legs have been swollen since Sunday.  She has been unable to sleep laying flat due to shortness of breath as well.  She endorses shortness of breath with exertion.  She states has been taking Bumex.  She states her nephrologist put her on an antibiotic for wound on her leg as well.  No fevers or other complaints at this time.      --------------------------------------------- PAST HISTORY ---------------------------------------------  Past Medical History:  has a past medical history of Asthma, Atherosclerosis of native artery of left leg with rest pain ([facility]), CAD (coronary artery disease), Cellulitis and abscess of trunk, Cerebellar infarct ([facility]), Chronic back pain, Chronic kidney disease, Chronic systolic congestive heart failure ([facility]), Chronic venous insufficiency, COPD (chronic obstructive pulmonary disease) ([facility]), COVID-19, Depression, Diabetes mellitus ([facility]), Diabetic neuropathy ([facility]), Diverticulosis, Fatty liver, Glaucoma, open angle, Hepatic encephalopathy ([facility]), Hiatal hernia, History of blood transfusion, Hyperlipidemia, Hyperplastic colon polyp, Hypertension, Incontinence, Liver mass, Morbid obesity ([facility]), Movement disorder, Myocardial infarction ([facility]), O2 dependent, Osteoarthritis, generalized, Pain in both lower legs, Peripheral vascular disease ([facility]), Pneumonia, Pulmonary edema, PVD (peripheral vascular disease) with claudication ([facility]), Sleep apnea, Status post peripheral artery angioplasty, Tobacco abuse, Tobacco abuse, Tobacco dependence, Tubular adenoma polyp of rectum, Urinary tract infection due to ESBL Klebsiella, and Ventral

## 2024-02-14 NOTE — PROGRESS NOTES
Subjective:      Patient ID:  Steph Gonzáles is a 68 y.o. female.    HPI:  here for recheck of her voice. She has reinkes edema and no change in voice. There are not changes since last visit. She has had long medical course recently of UTI hospitalizations but no change in voice, swallowing or breathing.   She has had issues with obtaining her dentition     Past Medical History:   Diagnosis Date    Asthma     Atherosclerosis of native artery of left leg with rest pain (HCC) 11/09/2018    CAD (coronary artery disease) 2011    Cellulitis and abscess of trunk 04/14/2015    Cerebellar infarct (HCC) 04/03/2019    Remote, rt. cerebellum, head CT scan, 1/9/19    Chronic back pain     Chronic kidney disease     Chronic systolic congestive heart failure (HCC) 10/04/2013    Chronic venous insufficiency 10/11/2017    COPD (chronic obstructive pulmonary disease) (HCC)     COVID-19     Depression     Diabetes mellitus (HCC)     Diabetic neuropathy (HCC)     Diverticulosis     Fatty liver 01/08/2016    per US    Glaucoma, open angle 02/01/2016    Mild-OU    Hepatic encephalopathy (HCC) 02/07/2016    resolved    Hiatal hernia     History of blood transfusion     Hyperlipidemia     Hyperplastic colon polyp     Hypertension     Incontinence     Liver mass     Morbid obesity (HCC)     Movement disorder     Myocardial infarction (HCC) 2011    O2 dependent 09/16/2021    with bipap 3 liters    Osteoarthritis, generalized     Pain in both lower legs 10/11/2017    Peripheral vascular disease (HCC)     Pneumonia 01/26/2014    Pulmonary edema     resolved    PVD (peripheral vascular disease) with claudication (HCC) 08/30/2017    Sleep apnea     uses BI PAP    Status post peripheral artery angioplasty 10/31/2019    Tobacco abuse     Tobacco abuse 10/11/2017    Tobacco dependence 6/30/2022    Tubular adenoma polyp of rectum     Urinary tract infection due to ESBL Klebsiella 03/08/2017    Ventral hernia      Past Surgical History:

## 2024-02-14 NOTE — ED NOTES
Bed assignment- 610 @ [facility]. [facility] will arrive for pickup in approximately 3 hours. N2N: [phone]

## 2024-02-15 ENCOUNTER — APPOINTMENT (OUTPATIENT)
Dept: CT IMAGING | Age: 69
DRG: 291 | End: 2024-02-15
Attending: INTERNAL MEDICINE
Payer: MEDICARE

## 2024-02-15 PROBLEM — R22.0 RIGHT FACIAL SWELLING: Status: ACTIVE | Noted: 2024-02-15

## 2024-02-15 PROBLEM — I50.23 ACUTE ON CHRONIC SYSTOLIC (CONGESTIVE) HEART FAILURE (HCC): Status: RESOLVED | Noted: 2024-02-15 | Resolved: 2024-02-15

## 2024-02-15 PROBLEM — I46.9 VENTRICULAR ASYSTOLIA (HCC): Status: ACTIVE | Noted: 2024-02-15

## 2024-02-15 PROBLEM — I45.9 ABNORMAL CARDIAC CONDUCTION: Status: ACTIVE | Noted: 2024-02-15

## 2024-02-15 PROBLEM — I50.23 ACUTE ON CHRONIC SYSTOLIC (CONGESTIVE) HEART FAILURE (HCC): Status: ACTIVE | Noted: 2024-02-15

## 2024-02-15 PROBLEM — I50.33 ACUTE ON CHRONIC DIASTOLIC HEART FAILURE (HCC): Status: ACTIVE | Noted: 2024-02-15

## 2024-02-15 PROBLEM — I45.9 HEART BLOCK: Status: ACTIVE | Noted: 2024-02-15

## 2024-02-15 LAB
ANION GAP SERPL CALCULATED.3IONS-SCNC: 8 MMOL/L (ref 7–16)
BASOPHILS # BLD: 0.02 K/UL (ref 0–0.2)
BASOPHILS NFR BLD: 0 % (ref 0–2)
BNP SERPL-MCNC: 3808 PG/ML (ref 0–125)
BUN SERPL-MCNC: 21 MG/DL (ref 6–23)
CALCIUM SERPL-MCNC: 9.1 MG/DL (ref 8.6–10.2)
CHLORIDE SERPL-SCNC: 105 MMOL/L (ref 98–107)
CO2 SERPL-SCNC: 25 MMOL/L (ref 22–29)
CREAT SERPL-MCNC: 1.3 MG/DL (ref 0.5–1)
EOSINOPHIL # BLD: 0.16 K/UL (ref 0.05–0.5)
EOSINOPHILS RELATIVE PERCENT: 2 % (ref 0–6)
ERYTHROCYTE [DISTWIDTH] IN BLOOD BY AUTOMATED COUNT: 12.3 % (ref 11.5–15)
GFR SERPL CREATININE-BSD FRML MDRD: 43 ML/MIN/1.73M2
GLUCOSE BLD-MCNC: 117 MG/DL (ref 74–99)
GLUCOSE BLD-MCNC: 162 MG/DL (ref 74–99)
GLUCOSE BLD-MCNC: 196 MG/DL (ref 74–99)
GLUCOSE BLD-MCNC: 229 MG/DL (ref 74–99)
GLUCOSE BLD-MCNC: 273 MG/DL (ref 74–99)
GLUCOSE SERPL-MCNC: 188 MG/DL (ref 74–99)
HCT VFR BLD AUTO: 31.8 % (ref 34–48)
HGB BLD-MCNC: 10.4 G/DL (ref 11.5–15.5)
IMM GRANULOCYTES # BLD AUTO: <0.03 K/UL (ref 0–0.58)
IMM GRANULOCYTES NFR BLD: 0 % (ref 0–5)
LYMPHOCYTES NFR BLD: 1.25 K/UL (ref 1.5–4)
LYMPHOCYTES RELATIVE PERCENT: 17 % (ref 20–42)
MAGNESIUM SERPL-MCNC: 1.6 MG/DL (ref 1.6–2.6)
MCH RBC QN AUTO: 31.6 PG (ref 26–35)
MCHC RBC AUTO-ENTMCNC: 32.7 G/DL (ref 32–34.5)
MCV RBC AUTO: 96.7 FL (ref 80–99.9)
MONOCYTES NFR BLD: 0.39 K/UL (ref 0.1–0.95)
MONOCYTES NFR BLD: 5 % (ref 2–12)
NEUTROPHILS NFR BLD: 75 % (ref 43–80)
NEUTS SEG NFR BLD: 5.44 K/UL (ref 1.8–7.3)
PLATELET # BLD AUTO: 258 K/UL (ref 130–450)
PMV BLD AUTO: 10.6 FL (ref 7–12)
POTASSIUM SERPL-SCNC: 3.9 MMOL/L (ref 3.5–5)
RBC # BLD AUTO: 3.29 M/UL (ref 3.5–5.5)
SODIUM SERPL-SCNC: 138 MMOL/L (ref 132–146)
T4 FREE SERPL-MCNC: 1.1 NG/DL (ref 0.9–1.7)
TSH SERPL DL<=0.05 MIU/L-ACNC: 1.58 UIU/ML (ref 0.27–4.2)
WBC OTHER # BLD: 7.3 K/UL (ref 4.5–11.5)

## 2024-02-15 PROCEDURE — 2580000003 HC RX 258: Performed by: STUDENT IN AN ORGANIZED HEALTH CARE EDUCATION/TRAINING PROGRAM

## 2024-02-15 PROCEDURE — 6370000000 HC RX 637 (ALT 250 FOR IP): Performed by: INTERNAL MEDICINE

## 2024-02-15 PROCEDURE — 6370000000 HC RX 637 (ALT 250 FOR IP): Performed by: STUDENT IN AN ORGANIZED HEALTH CARE EDUCATION/TRAINING PROGRAM

## 2024-02-15 PROCEDURE — 99223 1ST HOSP IP/OBS HIGH 75: CPT | Performed by: INTERNAL MEDICINE

## 2024-02-15 PROCEDURE — 80048 BASIC METABOLIC PNL TOTAL CA: CPT

## 2024-02-15 PROCEDURE — 84443 ASSAY THYROID STIM HORMONE: CPT

## 2024-02-15 PROCEDURE — 84439 ASSAY OF FREE THYROXINE: CPT

## 2024-02-15 PROCEDURE — APPSS60 APP SPLIT SHARED TIME 46-60 MINUTES

## 2024-02-15 PROCEDURE — 6360000002 HC RX W HCPCS: Performed by: STUDENT IN AN ORGANIZED HEALTH CARE EDUCATION/TRAINING PROGRAM

## 2024-02-15 PROCEDURE — 2060000000 HC ICU INTERMEDIATE R&B

## 2024-02-15 PROCEDURE — 83735 ASSAY OF MAGNESIUM: CPT

## 2024-02-15 PROCEDURE — 99222 1ST HOSP IP/OBS MODERATE 55: CPT | Performed by: STUDENT IN AN ORGANIZED HEALTH CARE EDUCATION/TRAINING PROGRAM

## 2024-02-15 PROCEDURE — 82962 GLUCOSE BLOOD TEST: CPT

## 2024-02-15 PROCEDURE — 2700000000 HC OXYGEN THERAPY PER DAY

## 2024-02-15 PROCEDURE — 94640 AIRWAY INHALATION TREATMENT: CPT

## 2024-02-15 PROCEDURE — 83880 ASSAY OF NATRIURETIC PEPTIDE: CPT

## 2024-02-15 PROCEDURE — 93005 ELECTROCARDIOGRAM TRACING: CPT | Performed by: INTERNAL MEDICINE

## 2024-02-15 PROCEDURE — 85025 COMPLETE CBC W/AUTO DIFF WBC: CPT

## 2024-02-15 PROCEDURE — 94660 CPAP INITIATION&MGMT: CPT

## 2024-02-15 PROCEDURE — 74176 CT ABD & PELVIS W/O CONTRAST: CPT

## 2024-02-15 PROCEDURE — 6360000002 HC RX W HCPCS

## 2024-02-15 RX ORDER — ARFORMOTEROL TARTRATE 15 UG/2ML
15 SOLUTION RESPIRATORY (INHALATION)
Status: DISCONTINUED | OUTPATIENT
Start: 2024-02-15 | End: 2024-02-18 | Stop reason: HOSPADM

## 2024-02-15 RX ORDER — FLUTICASONE PROPIONATE 50 MCG
2 SPRAY, SUSPENSION (ML) NASAL DAILY
Status: DISCONTINUED | OUTPATIENT
Start: 2024-02-15 | End: 2024-02-18 | Stop reason: HOSPADM

## 2024-02-15 RX ORDER — MINOXIDIL 2.5 MG/1
5 TABLET ORAL 2 TIMES DAILY
Status: DISCONTINUED | OUTPATIENT
Start: 2024-02-15 | End: 2024-02-17

## 2024-02-15 RX ORDER — SODIUM CHLORIDE 9 MG/ML
INJECTION, SOLUTION INTRAVENOUS PRN
Status: DISCONTINUED | OUTPATIENT
Start: 2024-02-15 | End: 2024-02-18 | Stop reason: HOSPADM

## 2024-02-15 RX ORDER — MAGNESIUM SULFATE IN WATER 40 MG/ML
2000 INJECTION, SOLUTION INTRAVENOUS PRN
Status: DISCONTINUED | OUTPATIENT
Start: 2024-02-15 | End: 2024-02-18 | Stop reason: HOSPADM

## 2024-02-15 RX ORDER — INSULIN LISPRO 100 [IU]/ML
0-8 INJECTION, SOLUTION INTRAVENOUS; SUBCUTANEOUS
Status: DISCONTINUED | OUTPATIENT
Start: 2024-02-15 | End: 2024-02-18 | Stop reason: HOSPADM

## 2024-02-15 RX ORDER — ATROPINE SULFATE 0.1 MG/ML
INJECTION INTRAVENOUS
Status: DISPENSED
Start: 2024-02-15 | End: 2024-02-15

## 2024-02-15 RX ORDER — OXYBUTYNIN CHLORIDE 5 MG/1
15 TABLET, EXTENDED RELEASE ORAL DAILY
Status: DISCONTINUED | OUTPATIENT
Start: 2024-02-15 | End: 2024-02-18 | Stop reason: HOSPADM

## 2024-02-15 RX ORDER — DULOXETIN HYDROCHLORIDE 60 MG/1
60 CAPSULE, DELAYED RELEASE ORAL DAILY
Status: DISCONTINUED | OUTPATIENT
Start: 2024-02-15 | End: 2024-02-18 | Stop reason: HOSPADM

## 2024-02-15 RX ORDER — ISOSORBIDE MONONITRATE 60 MG/1
60 TABLET, EXTENDED RELEASE ORAL DAILY
Status: DISCONTINUED | OUTPATIENT
Start: 2024-02-15 | End: 2024-02-18 | Stop reason: HOSPADM

## 2024-02-15 RX ORDER — GABAPENTIN 100 MG/1
100 CAPSULE ORAL 2 TIMES DAILY
Status: DISCONTINUED | OUTPATIENT
Start: 2024-02-15 | End: 2024-02-18 | Stop reason: HOSPADM

## 2024-02-15 RX ORDER — INSULIN GLARGINE 100 [IU]/ML
8 INJECTION, SOLUTION SUBCUTANEOUS NIGHTLY
Status: DISCONTINUED | OUTPATIENT
Start: 2024-02-15 | End: 2024-02-18 | Stop reason: HOSPADM

## 2024-02-15 RX ORDER — MONTELUKAST SODIUM 10 MG/1
10 TABLET ORAL NIGHTLY
Status: DISCONTINUED | OUTPATIENT
Start: 2024-02-15 | End: 2024-02-18 | Stop reason: HOSPADM

## 2024-02-15 RX ORDER — POLYETHYLENE GLYCOL 3350 17 G/17G
17 POWDER, FOR SOLUTION ORAL DAILY PRN
Status: DISCONTINUED | OUTPATIENT
Start: 2024-02-15 | End: 2024-02-15

## 2024-02-15 RX ORDER — POLYETHYLENE GLYCOL 3350 17 G/17G
17 POWDER, FOR SOLUTION ORAL DAILY
Status: DISCONTINUED | OUTPATIENT
Start: 2024-02-15 | End: 2024-02-18 | Stop reason: HOSPADM

## 2024-02-15 RX ORDER — AZELASTINE HYDROCHLORIDE 137 UG/1
2 SPRAY, METERED NASAL 2 TIMES DAILY
Status: DISCONTINUED | OUTPATIENT
Start: 2024-02-15 | End: 2024-02-15

## 2024-02-15 RX ORDER — MAGNESIUM SULFATE IN WATER 40 MG/ML
2000 INJECTION, SOLUTION INTRAVENOUS ONCE
Status: COMPLETED | OUTPATIENT
Start: 2024-02-15 | End: 2024-02-15

## 2024-02-15 RX ORDER — DEXTROSE MONOHYDRATE 100 MG/ML
INJECTION, SOLUTION INTRAVENOUS CONTINUOUS PRN
Status: DISCONTINUED | OUTPATIENT
Start: 2024-02-15 | End: 2024-02-18 | Stop reason: HOSPADM

## 2024-02-15 RX ORDER — ATROPINE SULFATE 0.1 MG/ML
1 INJECTION INTRAVENOUS PRN
Status: DISCONTINUED | OUTPATIENT
Start: 2024-02-15 | End: 2024-02-18 | Stop reason: HOSPADM

## 2024-02-15 RX ORDER — TRAZODONE HYDROCHLORIDE 50 MG/1
150 TABLET ORAL NIGHTLY
Status: DISCONTINUED | OUTPATIENT
Start: 2024-02-15 | End: 2024-02-18 | Stop reason: HOSPADM

## 2024-02-15 RX ORDER — VITAMIN B COMPLEX
1000 TABLET ORAL DAILY
Status: DISCONTINUED | OUTPATIENT
Start: 2024-02-15 | End: 2024-02-18 | Stop reason: HOSPADM

## 2024-02-15 RX ORDER — MINOXIDIL 2.5 MG/1
2.5 TABLET ORAL 2 TIMES DAILY
Status: DISCONTINUED | OUTPATIENT
Start: 2024-02-15 | End: 2024-02-15

## 2024-02-15 RX ORDER — LACTULOSE 10 G/15ML
20 SOLUTION ORAL ONCE
Status: DISCONTINUED | OUTPATIENT
Start: 2024-02-15 | End: 2024-02-18 | Stop reason: HOSPADM

## 2024-02-15 RX ORDER — HYDRALAZINE HYDROCHLORIDE 50 MG/1
100 TABLET, FILM COATED ORAL EVERY 8 HOURS SCHEDULED
Status: DISCONTINUED | OUTPATIENT
Start: 2024-02-15 | End: 2024-02-18 | Stop reason: HOSPADM

## 2024-02-15 RX ORDER — HYDROCODONE BITARTRATE AND ACETAMINOPHEN 5; 325 MG/1; MG/1
1 TABLET ORAL EVERY 12 HOURS PRN
Status: DISCONTINUED | OUTPATIENT
Start: 2024-02-15 | End: 2024-02-16

## 2024-02-15 RX ORDER — ENOXAPARIN SODIUM 100 MG/ML
40 INJECTION SUBCUTANEOUS DAILY
Status: DISCONTINUED | OUTPATIENT
Start: 2024-02-15 | End: 2024-02-15

## 2024-02-15 RX ORDER — SODIUM CHLORIDE 0.9 % (FLUSH) 0.9 %
5-40 SYRINGE (ML) INJECTION EVERY 12 HOURS SCHEDULED
Status: DISCONTINUED | OUTPATIENT
Start: 2024-02-15 | End: 2024-02-18 | Stop reason: HOSPADM

## 2024-02-15 RX ORDER — HEPARIN SODIUM 10000 [USP'U]/ML
5000 INJECTION, SOLUTION INTRAVENOUS; SUBCUTANEOUS EVERY 8 HOURS SCHEDULED
Status: DISCONTINUED | OUTPATIENT
Start: 2024-02-15 | End: 2024-02-18 | Stop reason: HOSPADM

## 2024-02-15 RX ORDER — SODIUM CHLORIDE 0.9 % (FLUSH) 0.9 %
5-40 SYRINGE (ML) INJECTION PRN
Status: DISCONTINUED | OUTPATIENT
Start: 2024-02-15 | End: 2024-02-18 | Stop reason: HOSPADM

## 2024-02-15 RX ORDER — BUDESONIDE 0.5 MG/2ML
0.5 INHALANT ORAL
Status: DISCONTINUED | OUTPATIENT
Start: 2024-02-15 | End: 2024-02-18 | Stop reason: HOSPADM

## 2024-02-15 RX ORDER — POTASSIUM CHLORIDE 7.45 MG/ML
10 INJECTION INTRAVENOUS PRN
Status: DISCONTINUED | OUTPATIENT
Start: 2024-02-15 | End: 2024-02-18 | Stop reason: HOSPADM

## 2024-02-15 RX ORDER — PANTOPRAZOLE SODIUM 40 MG/1
40 TABLET, DELAYED RELEASE ORAL
Status: DISCONTINUED | OUTPATIENT
Start: 2024-02-15 | End: 2024-02-18 | Stop reason: HOSPADM

## 2024-02-15 RX ORDER — INSULIN LISPRO 100 [IU]/ML
0-4 INJECTION, SOLUTION INTRAVENOUS; SUBCUTANEOUS NIGHTLY
Status: DISCONTINUED | OUTPATIENT
Start: 2024-02-15 | End: 2024-02-18 | Stop reason: HOSPADM

## 2024-02-15 RX ORDER — ASPIRIN 81 MG/1
81 TABLET, CHEWABLE ORAL DAILY
Status: DISCONTINUED | OUTPATIENT
Start: 2024-02-15 | End: 2024-02-18 | Stop reason: HOSPADM

## 2024-02-15 RX ORDER — BUDESONIDE AND FORMOTEROL FUMARATE DIHYDRATE 160; 4.5 UG/1; UG/1
2 AEROSOL RESPIRATORY (INHALATION) 2 TIMES DAILY
Status: DISCONTINUED | OUTPATIENT
Start: 2024-02-15 | End: 2024-02-15 | Stop reason: CLARIF

## 2024-02-15 RX ORDER — POTASSIUM CHLORIDE 20 MEQ/1
40 TABLET, EXTENDED RELEASE ORAL PRN
Status: DISCONTINUED | OUTPATIENT
Start: 2024-02-15 | End: 2024-02-18 | Stop reason: HOSPADM

## 2024-02-15 RX ORDER — ATORVASTATIN CALCIUM 10 MG/1
10 TABLET, FILM COATED ORAL DAILY
Status: DISCONTINUED | OUTPATIENT
Start: 2024-02-15 | End: 2024-02-18 | Stop reason: HOSPADM

## 2024-02-15 RX ORDER — BUMETANIDE 0.25 MG/ML
1 INJECTION INTRAMUSCULAR; INTRAVENOUS EVERY 12 HOURS
Status: DISCONTINUED | OUTPATIENT
Start: 2024-02-15 | End: 2024-02-17

## 2024-02-15 RX ADMIN — BUMETANIDE 1 MG: 0.25 INJECTION INTRAMUSCULAR; INTRAVENOUS at 18:13

## 2024-02-15 RX ADMIN — MONTELUKAST SODIUM 10 MG: 10 TABLET ORAL at 01:54

## 2024-02-15 RX ADMIN — GABAPENTIN 100 MG: 100 CAPSULE ORAL at 09:44

## 2024-02-15 RX ADMIN — GABAPENTIN 100 MG: 100 CAPSULE ORAL at 01:54

## 2024-02-15 RX ADMIN — HEPARIN SODIUM 5000 UNITS: 10000 INJECTION INTRAVENOUS; SUBCUTANEOUS at 06:32

## 2024-02-15 RX ADMIN — SODIUM CHLORIDE, PRESERVATIVE FREE 10 ML: 5 INJECTION INTRAVENOUS at 21:34

## 2024-02-15 RX ADMIN — GABAPENTIN 100 MG: 100 CAPSULE ORAL at 21:33

## 2024-02-15 RX ADMIN — HYDRALAZINE HYDROCHLORIDE 100 MG: 50 TABLET ORAL at 05:32

## 2024-02-15 RX ADMIN — MAGNESIUM SULFATE HEPTAHYDRATE 2000 MG: 40 INJECTION, SOLUTION INTRAVENOUS at 11:04

## 2024-02-15 RX ADMIN — PANTOPRAZOLE SODIUM 40 MG: 40 TABLET, DELAYED RELEASE ORAL at 09:43

## 2024-02-15 RX ADMIN — BUDESONIDE 500 MCG: 0.5 SUSPENSION RESPIRATORY (INHALATION) at 20:55

## 2024-02-15 RX ADMIN — MINOXIDIL 2.5 MG: 2.5 TABLET ORAL at 01:57

## 2024-02-15 RX ADMIN — BUDESONIDE 500 MCG: 0.5 SUSPENSION RESPIRATORY (INHALATION) at 08:00

## 2024-02-15 RX ADMIN — TRAZODONE HYDROCHLORIDE 150 MG: 50 TABLET ORAL at 21:33

## 2024-02-15 RX ADMIN — ISOSORBIDE MONONITRATE 60 MG: 60 TABLET, EXTENDED RELEASE ORAL at 09:44

## 2024-02-15 RX ADMIN — MINOXIDIL 5 MG: 2.5 TABLET ORAL at 21:33

## 2024-02-15 RX ADMIN — SACUBITRIL AND VALSARTAN 1 TABLET: 97; 103 TABLET, FILM COATED ORAL at 01:54

## 2024-02-15 RX ADMIN — BUMETANIDE 1 MG: 0.25 INJECTION INTRAMUSCULAR; INTRAVENOUS at 05:32

## 2024-02-15 RX ADMIN — ATORVASTATIN CALCIUM 10 MG: 10 TABLET, FILM COATED ORAL at 09:44

## 2024-02-15 RX ADMIN — HYDRALAZINE HYDROCHLORIDE 100 MG: 50 TABLET ORAL at 21:33

## 2024-02-15 RX ADMIN — INSULIN GLARGINE 8 UNITS: 100 INJECTION, SOLUTION SUBCUTANEOUS at 21:38

## 2024-02-15 RX ADMIN — ARFORMOTEROL TARTRATE 15 MCG: 15 SOLUTION RESPIRATORY (INHALATION) at 08:00

## 2024-02-15 RX ADMIN — CLONIDINE HYDROCHLORIDE 0.3 MG: 0.2 TABLET ORAL at 09:43

## 2024-02-15 RX ADMIN — HYDROCODONE BITARTRATE AND ACETAMINOPHEN 1 TABLET: 5; 325 TABLET ORAL at 16:18

## 2024-02-15 RX ADMIN — FLUTICASONE PROPIONATE 2 SPRAY: 50 SPRAY, METERED NASAL at 09:42

## 2024-02-15 RX ADMIN — INSULIN LISPRO 2 UNITS: 100 INJECTION, SOLUTION INTRAVENOUS; SUBCUTANEOUS at 12:36

## 2024-02-15 RX ADMIN — HYDRALAZINE HYDROCHLORIDE 100 MG: 50 TABLET ORAL at 14:32

## 2024-02-15 RX ADMIN — Medication 1000 UNITS: at 09:43

## 2024-02-15 RX ADMIN — INSULIN GLARGINE 8 UNITS: 100 INJECTION, SOLUTION SUBCUTANEOUS at 02:07

## 2024-02-15 RX ADMIN — MONTELUKAST SODIUM 10 MG: 10 TABLET ORAL at 21:33

## 2024-02-15 RX ADMIN — SODIUM CHLORIDE, PRESERVATIVE FREE 10 ML: 5 INJECTION INTRAVENOUS at 09:45

## 2024-02-15 RX ADMIN — DULOXETINE HYDROCHLORIDE 60 MG: 60 CAPSULE, DELAYED RELEASE ORAL at 09:43

## 2024-02-15 RX ADMIN — TRAZODONE HYDROCHLORIDE 150 MG: 50 TABLET ORAL at 01:54

## 2024-02-15 RX ADMIN — SACUBITRIL AND VALSARTAN 1 TABLET: 97; 103 TABLET, FILM COATED ORAL at 09:44

## 2024-02-15 RX ADMIN — SACUBITRIL AND VALSARTAN 1 TABLET: 97; 103 TABLET, FILM COATED ORAL at 21:33

## 2024-02-15 RX ADMIN — HEPARIN SODIUM 5000 UNITS: 10000 INJECTION INTRAVENOUS; SUBCUTANEOUS at 14:33

## 2024-02-15 RX ADMIN — HEPARIN SODIUM 5000 UNITS: 10000 INJECTION INTRAVENOUS; SUBCUTANEOUS at 21:33

## 2024-02-15 RX ADMIN — MINOXIDIL 2.5 MG: 2.5 TABLET ORAL at 09:44

## 2024-02-15 RX ADMIN — OXYBUTYNIN CHLORIDE 15 MG: 5 TABLET, EXTENDED RELEASE ORAL at 09:44

## 2024-02-15 RX ADMIN — ASPIRIN 81 MG CHEWABLE TABLET 81 MG: 81 TABLET CHEWABLE at 09:44

## 2024-02-15 RX ADMIN — ARFORMOTEROL TARTRATE 15 MCG: 15 SOLUTION RESPIRATORY (INHALATION) at 20:54

## 2024-02-15 ASSESSMENT — PAIN DESCRIPTION - LOCATION: LOCATION: OTHER (COMMENT)

## 2024-02-15 ASSESSMENT — PAIN SCALES - GENERAL: PAINLEVEL_OUTOF10: 10

## 2024-02-15 NOTE — PLAN OF CARE
Patient is 68-year-old female who was admitted overnight due to CHF exacerbation.    Acute on chronic systolic heart failure with recovered EF.  Last echo in December showed EF 60 to 65%.  Continue Entresto, hydralazine, nitrate.  Noted pleural effusions on x-ray, lower extremity edema, elevated proBNP at 3800.  Continue diuresis with IV Bumex 1 mg twice daily.  Strict I's and O's.  Daily weights.  Cardiology following  Shortness of breath and hypoxia.  2/2 #1.  Currently requiring 2 L NC.  Wean as tolerated.  Continue diuresis  History of RBBB and high-grade AV block.  Did have an 8-second pause overnight.  Likely due to poorly treated DAYAN.  Avoid tyrese blocking agents.  Hold clonidine.  14-day monitor on discharge per cardio  DM2.  Continue MD Christiano SS.  Hypoglycemia protocol.  Carb controlled diet  Lower abdominal pain.  Likely 2/2 constipation vs cystitis vs edema from CHF.  Continue IV diuresis.  Check UA.  Bowel regimen  CAD.  S/p CABG in 2011.  Continue GDMT  Hypertension.  Uncontrolled.  Titrate medications per cardio

## 2024-02-15 NOTE — PLAN OF CARE
Problem: Discharge Planning  Goal: Discharge to home or other facility with appropriate resources  Outcome: Progressing     Problem: Skin/Tissue Integrity  Goal: Absence of new skin breakdown  Description: 1.  Monitor for areas of redness and/or skin breakdown  2.  Assess vascular access sites hourly  3.  Every 4-6 hours minimum:  Change oxygen saturation probe site  4.  Every 4-6 hours:  If on nasal continuous positive airway pressure, respiratory therapy assess nares and determine need for appliance change or resting period.  Outcome: Progressing     Problem: Safety - Adult  Goal: Free from fall injury  Outcome: Progressing     Problem: Pain  Goal: Verbalizes/displays adequate comfort level or baseline comfort level  Outcome: Progressing     Problem: Chronic Conditions and Co-morbidities  Goal: Patient's chronic conditions and co-morbidity symptoms are monitored and maintained or improved  Outcome: Progressing

## 2024-02-15 NOTE — CONSULTS
Comprehensive Nutrition Assessment    Type and Reason for Visit:  Initial, Positive Nutrition Screen    Nutrition Recommendations/Plan:   Continue current diet  Add ONS Glucerna once daily  Will monitor     Malnutrition Assessment:  Malnutrition Status:  At risk for malnutrition (Comment) (02/15/24 1142)    Context:  Chronic Illness     Findings of the 6 clinical characteristics of malnutrition:  Energy Intake:  Mild decrease in energy intake (Comment) (pt reports lowered appetite and did not eat breakfast because she didn't like what was served)  Weight Loss:  No significant weight loss     Body Fat Loss:  No significant body fat loss     Muscle Mass Loss:  No significant muscle mass loss    Fluid Accumulation:  Unable to assess (d/t multifactorial)     Strength:  Not Performed    Nutrition Assessment:    Pt admits w/ acute on chronic diastolic (congestive) heart failure. PMHx: CAD, CKD, COPD, CVA, DM, HTN, morbid obesity, fatty liver, diverticulosis, and PVD. Pt. states she consumed none of breakfast because it was cold and she does not eat eggs. She has a low appetite but will try glucerna once daily. She also agreed to wanting heart failure education. Discussed heart failure diet guidelines w/ pt and she accepted handout.    Nutrition Related Findings:    A&O, Abd: WDL, +1 edema x4 Wound Type: Wound Consult Pending (small BLE wounds per consult)       Current Nutrition Intake & Therapies:    Average Meal Intake: 1-25% (per pt, didn't want food that was served at breakfast)  Average Supplements Intake: None Ordered  ADULT DIET; Regular; 4 carb choices (60 gm/meal); Low Sodium (2 gm)    Anthropometric Measures:  Height: 157.5 cm (5' 2.01\")  Ideal Body Weight (IBW): 110 lbs (50 kg)       Current Body Weight: 86.3 kg (190 lb 4.1 oz) (cardiologist 2/8/24, UTO CBW she was sitting in chair), 173 % IBW. Weight Source: Not Specified  Current BMI (kg/m2): 34.8  Usual Body Weight: 81.6 kg (179 lb 14.3 oz) (12/22/23)  % 
chest pain.  She reports new mild orthopnea without PND.  She reports he has been compliant with her trilogy at home.  She reports she has been compliant with medications at home.  She is also complaining of right facial swelling which is an ongoing issue as she follows with ENT.  Currently patient is sinus rhythm in the 80s.  On exam she has diminished lower lobes, no peripheral swelling, 2/6 systolic murmur, and JVD is hard to establish due to a very thick neck.  Patient reporting she still feels about the same as before she came in mainly complaining of fatigue.  Overnight patient had Mobitz 2 and 8.67-second ventricular pause as shown at bottom of note.    Most recent VS 99 Fahrenheit, 16 respirations, 78 pulse, 192/83, 97% on 2 L nasal cannula.    Please note: past medical records were reviewed per electronic medical record (EMR) - see detailed reports under Past Medical/ Surgical History.   Past Medical History:    CAD:  CABG 2011 LIMA to LAD, SVG to OM 1  [facility] 4/2015 Dr. Cardoso: RCA diffuse moderate disease but no high-grade stenosis.  Left main free of disease.  Status post remote bypass grafting.  Patent SVG-OM circumflex.  Unable to selectively cannulate LIMA graft but there is no significant disease in LAD itself.  LCx moderate caliber but occluded at its proximal segment.  Moderate global LV systolic dysfunction.  Stress test 6/2018: Pharmacologically induced perfusion defect involving lateral wall without evidence of reversibility.  Diffusely hypokinetic LV wall motion with focal area of akinesia involving mid lateral wall.  EF 42%.  Lexiscan 4/2022: Medium size, moderate intensity scar in the inferior, inferior lateral and lateral wall with partial reversibility.  Inferior and inferior lateral wall appears mildly hypokinetic with estimated LVEF 40%.  Intermediate risk scan.  Done during admission with [facility].  Aggressive medical management and recommendation for evaluation outpatient for [facility] once kidney 
documentation for full consult.    ASSESSMENT AND PLAN:  Patient Active Problem List   Diagnosis    Severe obesity (BMI 35.0-35.9 with comorbidity) ([facility])    Hyperlipidemia    DAYAN and COPD overlap syndrome ([facility])    Vitamin D insufficiency    Chronic combined systolic and diastolic heart failure ([facility])    GERD (gastroesophageal reflux disease)    Major depressive disorder, recurrent episode, mild (HCC)    Diabetic polyneuropathy associated with type 2 diabetes mellitus ([facility])    Chronic passive hepatic congestion    Mixed incontinence urge and stress    Chronic back pain - d/t muscle spasm    Glaucoma, open angle    Elevated CA 19-9 level    Lumbar stenosis    Spondylosis 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    Lumbar disc herniation    Asymmetric septal hypertrophy ([facility])    Non-compliance    Hiatal hernia    Melanosis coli    Coronary artery disease involving native coronary artery of native heart without angina pectoris    DM2 (diabetes mellitus, type 2) ([facility])    Cigarette smoker    Marijuana use, smoked    Ischemic cardiomyopathy    Encounter for tobacco use cessation counseling    PVD (peripheral vascular disease) with claudication ([facility])    Tobacco abuse    Chronic venous insufficiency    History of non-ST elevation myocardial infarction (NSTEMI)    Prolonged Q-T interval on ECG    History of stroke    COPD exacerbation ([facility])    Chronic respiratory failure with hypoxia and hypercapnia ([facility])    COPD (chronic obstructive pulmonary disease) ([facility])    Status post peripheral artery angioplasty    Uncontrolled hypertension    O2 dependent    Respiratory distress    Acute on chronic diastolic (congestive) heart failure ([facility])    CKD (chronic kidney disease) stage 3, GFR 30-59 ml/min ([facility])    Hypertensive emergency    Vocal cord mass    Tobacco dependence    Postoperative abscess    Lesion of true vocal cord    ALEC (acute kidney injury) ([facility])    Acute cystitis without hematuria    Acute on chronic renal

## 2024-02-15 NOTE — CARE COORDINATION
Social work / Discharge Planning:           Social work attempted to see patient for initial assessment but she was not available due to speaking to the cardiologist.      She lives with her grandson and ambulates using a walker or cane.   Patient has a NIV, nebulizer and home oxygen from [facility].     She has caregivers from [facility] 8 hours per day.     Cardiology is following.     Currently on IV Bumex.   Electronically signed by SINDHU Bucio on 2/15/2024 at 1:53 PM

## 2024-02-15 NOTE — ACP (ADVANCE CARE PLANNING)
Advance Care Planning   Healthcare Decision Maker:    Primary Decision Maker: Adams Garciaamaurynicolas Tima - Child - [phone]    Secondary Decision Maker: Fatmata Conrad - Brother/Sister - [phone]    Click here to complete Healthcare Decision Makers including selection of the Healthcare Decision Maker Relationship (ie \"Primary\").

## 2024-02-15 NOTE — H&P
[facility] Hospitalist Group History and Physical      CHIEF COMPLAINT:  leg/feet swelling    History of Present Illness:  69 yo female w/ hx of CAD w/ CABG, HFpEF, COPD, DAYAN on Trelegy, severe PVD, HTN, HLD, DM, CKD3a who p/w 4 day hx of LE edema and feet swelling with development of SAUNDERS yesterday. Pt also noted right sided facial swelling for which she saw her ENT physician who sent pt to the ED. In the ED, pt was evaluated and found to have CHF exacerbation and was admitted for further management.     Pt does also report some lower abdominal pain that started 4 days ago, with occasional nausea. Denies fevers, CP.         REVIEW OF SYSTEMS:  As per HPI.    PMH:  Past Medical History:   Diagnosis Date    Asthma     Atherosclerosis of native artery of left leg with rest pain (HCC) 11/09/2018    CAD (coronary artery disease) 2011    Cellulitis and abscess of trunk 04/14/2015    Cerebellar infarct (HCC) 04/03/2019    Remote, rt. cerebellum, head CT scan, 1/9/19    Chronic back pain     Chronic kidney disease     Chronic systolic congestive heart failure (HCC) 10/04/2013    Chronic venous insufficiency 10/11/2017    COPD (chronic obstructive pulmonary disease) (HCC)     COVID-19     Depression     Diabetes mellitus (HCC)     Diabetic neuropathy (HCC)     Diverticulosis     Fatty liver 01/08/2016    per US    Glaucoma, open angle 02/01/2016    Mild-OU    Hepatic encephalopathy (HCC) 02/07/2016    resolved    Hiatal hernia     History of blood transfusion     Hyperlipidemia     Hyperplastic colon polyp     Hypertension     Incontinence     Liver mass     Morbid obesity (HCC)     Movement disorder     Myocardial infarction (HCC) 2011    O2 dependent 09/16/2021    with bipap 3 liters    Osteoarthritis, generalized     Pain in both lower legs 10/11/2017    Peripheral vascular disease (HCC)     Pneumonia 01/26/2014    Pulmonary edema     resolved    PVD (peripheral vascular disease) with claudication (HCC) 08/30/2017

## 2024-02-15 NOTE — HOME CARE
PT IS CURRENT WITH [facility]  SKILLED NURSING PT/OT. WILL NEED ACTIVE VIRGIE ORDERS ON CHART AT TIME OF DC.    Ivon Hutton Lpn  Intake Liaison  [facility]

## 2024-02-16 LAB
ANION GAP SERPL CALCULATED.3IONS-SCNC: 11 MMOL/L (ref 7–16)
BUN SERPL-MCNC: 21 MG/DL (ref 6–23)
CALCIUM SERPL-MCNC: 9.1 MG/DL (ref 8.6–10.2)
CHLORIDE SERPL-SCNC: 102 MMOL/L (ref 98–107)
CO2 SERPL-SCNC: 22 MMOL/L (ref 22–29)
CREAT SERPL-MCNC: 1.4 MG/DL (ref 0.5–1)
ERYTHROCYTE [DISTWIDTH] IN BLOOD BY AUTOMATED COUNT: 12.3 % (ref 11.5–15)
GFR SERPL CREATININE-BSD FRML MDRD: 41 ML/MIN/1.73M2
GLUCOSE BLD-MCNC: 140 MG/DL (ref 74–99)
GLUCOSE BLD-MCNC: 177 MG/DL (ref 74–99)
GLUCOSE BLD-MCNC: 181 MG/DL (ref 74–99)
GLUCOSE BLD-MCNC: 232 MG/DL (ref 74–99)
GLUCOSE SERPL-MCNC: 154 MG/DL (ref 74–99)
HCT VFR BLD AUTO: 33.9 % (ref 34–48)
HGB BLD-MCNC: 10.8 G/DL (ref 11.5–15.5)
MCH RBC QN AUTO: 31.4 PG (ref 26–35)
MCHC RBC AUTO-ENTMCNC: 31.9 G/DL (ref 32–34.5)
MCV RBC AUTO: 98.5 FL (ref 80–99.9)
PLATELET # BLD AUTO: 276 K/UL (ref 130–450)
PMV BLD AUTO: 10.5 FL (ref 7–12)
POTASSIUM SERPL-SCNC: 3.7 MMOL/L (ref 3.5–5)
RBC # BLD AUTO: 3.44 M/UL (ref 3.5–5.5)
SODIUM SERPL-SCNC: 135 MMOL/L (ref 132–146)
WBC OTHER # BLD: 6.5 K/UL (ref 4.5–11.5)

## 2024-02-16 PROCEDURE — 2500000003 HC RX 250 WO HCPCS: Performed by: INTERNAL MEDICINE

## 2024-02-16 PROCEDURE — 6370000000 HC RX 637 (ALT 250 FOR IP): Performed by: STUDENT IN AN ORGANIZED HEALTH CARE EDUCATION/TRAINING PROGRAM

## 2024-02-16 PROCEDURE — 99233 SBSQ HOSP IP/OBS HIGH 50: CPT | Performed by: INTERNAL MEDICINE

## 2024-02-16 PROCEDURE — 94640 AIRWAY INHALATION TREATMENT: CPT

## 2024-02-16 PROCEDURE — 36415 COLL VENOUS BLD VENIPUNCTURE: CPT

## 2024-02-16 PROCEDURE — 82962 GLUCOSE BLOOD TEST: CPT

## 2024-02-16 PROCEDURE — 6370000000 HC RX 637 (ALT 250 FOR IP): Performed by: INTERNAL MEDICINE

## 2024-02-16 PROCEDURE — 6370000000 HC RX 637 (ALT 250 FOR IP): Performed by: NURSE PRACTITIONER

## 2024-02-16 PROCEDURE — 2060000000 HC ICU INTERMEDIATE R&B

## 2024-02-16 PROCEDURE — 6360000002 HC RX W HCPCS: Performed by: STUDENT IN AN ORGANIZED HEALTH CARE EDUCATION/TRAINING PROGRAM

## 2024-02-16 PROCEDURE — 85027 COMPLETE CBC AUTOMATED: CPT

## 2024-02-16 PROCEDURE — 80048 BASIC METABOLIC PNL TOTAL CA: CPT

## 2024-02-16 PROCEDURE — 2580000003 HC RX 258: Performed by: STUDENT IN AN ORGANIZED HEALTH CARE EDUCATION/TRAINING PROGRAM

## 2024-02-16 PROCEDURE — 99232 SBSQ HOSP IP/OBS MODERATE 35: CPT | Performed by: INTERNAL MEDICINE

## 2024-02-16 PROCEDURE — 94660 CPAP INITIATION&MGMT: CPT

## 2024-02-16 RX ORDER — HYDROCODONE BITARTRATE AND ACETAMINOPHEN 5; 325 MG/1; MG/1
1 TABLET ORAL EVERY 8 HOURS PRN
Status: DISCONTINUED | OUTPATIENT
Start: 2024-02-16 | End: 2024-02-17

## 2024-02-16 RX ORDER — GUAIFENESIN 400 MG/1
400 TABLET ORAL 4 TIMES DAILY
Status: DISCONTINUED | OUTPATIENT
Start: 2024-02-16 | End: 2024-02-18 | Stop reason: HOSPADM

## 2024-02-16 RX ORDER — DOCUSATE SODIUM 100 MG/1
100 CAPSULE, LIQUID FILLED ORAL DAILY
Status: DISCONTINUED | OUTPATIENT
Start: 2024-02-16 | End: 2024-02-18 | Stop reason: HOSPADM

## 2024-02-16 RX ORDER — ACETAMINOPHEN 325 MG/1
650 TABLET ORAL EVERY 6 HOURS PRN
Status: DISCONTINUED | OUTPATIENT
Start: 2024-02-16 | End: 2024-02-18 | Stop reason: HOSPADM

## 2024-02-16 RX ADMIN — TRAZODONE HYDROCHLORIDE 150 MG: 50 TABLET ORAL at 21:50

## 2024-02-16 RX ADMIN — INSULIN GLARGINE 8 UNITS: 100 INJECTION, SOLUTION SUBCUTANEOUS at 21:50

## 2024-02-16 RX ADMIN — HYDRALAZINE HYDROCHLORIDE 100 MG: 50 TABLET ORAL at 14:19

## 2024-02-16 RX ADMIN — SILVER SULFADIAZINE: 10 CREAM TOPICAL at 14:20

## 2024-02-16 RX ADMIN — SACUBITRIL AND VALSARTAN 1 TABLET: 97; 103 TABLET, FILM COATED ORAL at 08:32

## 2024-02-16 RX ADMIN — HEPARIN SODIUM 5000 UNITS: 10000 INJECTION INTRAVENOUS; SUBCUTANEOUS at 14:19

## 2024-02-16 RX ADMIN — GUAIFENESIN 400 MG: 400 TABLET ORAL at 12:20

## 2024-02-16 RX ADMIN — SACUBITRIL AND VALSARTAN 1 TABLET: 97; 103 TABLET, FILM COATED ORAL at 21:50

## 2024-02-16 RX ADMIN — GUAIFENESIN 400 MG: 400 TABLET ORAL at 21:50

## 2024-02-16 RX ADMIN — OXYBUTYNIN CHLORIDE 15 MG: 5 TABLET, EXTENDED RELEASE ORAL at 08:32

## 2024-02-16 RX ADMIN — GABAPENTIN 100 MG: 100 CAPSULE ORAL at 21:50

## 2024-02-16 RX ADMIN — ARFORMOTEROL TARTRATE 15 MCG: 15 SOLUTION RESPIRATORY (INHALATION) at 19:28

## 2024-02-16 RX ADMIN — ARFORMOTEROL TARTRATE 15 MCG: 15 SOLUTION RESPIRATORY (INHALATION) at 07:54

## 2024-02-16 RX ADMIN — DULOXETINE HYDROCHLORIDE 60 MG: 60 CAPSULE, DELAYED RELEASE ORAL at 08:30

## 2024-02-16 RX ADMIN — MINOXIDIL 5 MG: 2.5 TABLET ORAL at 08:30

## 2024-02-16 RX ADMIN — ASPIRIN 81 MG CHEWABLE TABLET 81 MG: 81 TABLET CHEWABLE at 08:32

## 2024-02-16 RX ADMIN — INSULIN LISPRO 2 UNITS: 100 INJECTION, SOLUTION INTRAVENOUS; SUBCUTANEOUS at 11:26

## 2024-02-16 RX ADMIN — BUMETANIDE 1 MG: 0.25 INJECTION INTRAMUSCULAR; INTRAVENOUS at 05:59

## 2024-02-16 RX ADMIN — HEPARIN SODIUM 5000 UNITS: 10000 INJECTION INTRAVENOUS; SUBCUTANEOUS at 21:52

## 2024-02-16 RX ADMIN — SODIUM CHLORIDE, PRESERVATIVE FREE 10 ML: 5 INJECTION INTRAVENOUS at 08:33

## 2024-02-16 RX ADMIN — DOCUSATE SODIUM 100 MG: 100 CAPSULE, LIQUID FILLED ORAL at 14:22

## 2024-02-16 RX ADMIN — Medication 1000 UNITS: at 08:32

## 2024-02-16 RX ADMIN — FLUTICASONE PROPIONATE 2 SPRAY: 50 SPRAY, METERED NASAL at 08:33

## 2024-02-16 RX ADMIN — HYDROCODONE BITARTRATE AND ACETAMINOPHEN 1 TABLET: 5; 325 TABLET ORAL at 04:26

## 2024-02-16 RX ADMIN — MINOXIDIL 5 MG: 2.5 TABLET ORAL at 21:50

## 2024-02-16 RX ADMIN — GUAIFENESIN 400 MG: 400 TABLET ORAL at 14:19

## 2024-02-16 RX ADMIN — ACETAMINOPHEN 650 MG: 325 TABLET ORAL at 00:18

## 2024-02-16 RX ADMIN — SODIUM CHLORIDE, PRESERVATIVE FREE 10 ML: 5 INJECTION INTRAVENOUS at 21:51

## 2024-02-16 RX ADMIN — PANTOPRAZOLE SODIUM 40 MG: 40 TABLET, DELAYED RELEASE ORAL at 05:59

## 2024-02-16 RX ADMIN — BUMETANIDE 1 MG: 0.25 INJECTION INTRAMUSCULAR; INTRAVENOUS at 17:32

## 2024-02-16 RX ADMIN — HYDROCODONE BITARTRATE AND ACETAMINOPHEN 1 TABLET: 5; 325 TABLET ORAL at 12:20

## 2024-02-16 RX ADMIN — GUAIFENESIN 400 MG: 400 TABLET ORAL at 17:32

## 2024-02-16 RX ADMIN — BUDESONIDE 500 MCG: 0.5 SUSPENSION RESPIRATORY (INHALATION) at 19:28

## 2024-02-16 RX ADMIN — ISOSORBIDE MONONITRATE 60 MG: 60 TABLET, EXTENDED RELEASE ORAL at 08:33

## 2024-02-16 RX ADMIN — MONTELUKAST SODIUM 10 MG: 10 TABLET ORAL at 21:50

## 2024-02-16 RX ADMIN — ATORVASTATIN CALCIUM 10 MG: 10 TABLET, FILM COATED ORAL at 08:33

## 2024-02-16 RX ADMIN — HYDRALAZINE HYDROCHLORIDE 100 MG: 50 TABLET ORAL at 21:50

## 2024-02-16 RX ADMIN — HEPARIN SODIUM 5000 UNITS: 10000 INJECTION INTRAVENOUS; SUBCUTANEOUS at 05:59

## 2024-02-16 RX ADMIN — GABAPENTIN 100 MG: 100 CAPSULE ORAL at 08:32

## 2024-02-16 RX ADMIN — HYDROCODONE BITARTRATE AND ACETAMINOPHEN 1 TABLET: 5; 325 TABLET ORAL at 21:50

## 2024-02-16 RX ADMIN — BUDESONIDE 500 MCG: 0.5 SUSPENSION RESPIRATORY (INHALATION) at 07:54

## 2024-02-16 ASSESSMENT — PAIN DESCRIPTION - LOCATION
LOCATION: LEG

## 2024-02-16 ASSESSMENT — PAIN DESCRIPTION - ORIENTATION
ORIENTATION: LEFT
ORIENTATION: RIGHT

## 2024-02-16 ASSESSMENT — PAIN SCALES - GENERAL
PAINLEVEL_OUTOF10: 10
PAINLEVEL_OUTOF10: 7
PAINLEVEL_OUTOF10: 10
PAINLEVEL_OUTOF10: 9
PAINLEVEL_OUTOF10: 10
PAINLEVEL_OUTOF10: 0

## 2024-02-16 ASSESSMENT — PAIN DESCRIPTION - DESCRIPTORS
DESCRIPTORS: ACHING;DISCOMFORT
DESCRIPTORS: DISCOMFORT;SHARP;STABBING
DESCRIPTORS: DISCOMFORT;SHARP;STABBING

## 2024-02-16 NOTE — CARE COORDINATION
Social work / Discharge Planning:          Discharge plan is home with grandson.   She has a walker, cane, NIV, nebulizer and oxygen from [facility].   Currently on IV Bumex.     Patient is active with [facility] and will need VIRGIE prior to discharge.     She also has caregivers from [facility] 8 hours per day.     Cardiology is following.    Electronically signed by SINDHU Bucio on 2/16/2024 at 11:57 AM

## 2024-02-17 LAB
ANION GAP SERPL CALCULATED.3IONS-SCNC: 11 MMOL/L (ref 7–16)
BUN SERPL-MCNC: 24 MG/DL (ref 6–23)
CALCIUM SERPL-MCNC: 9.6 MG/DL (ref 8.6–10.2)
CHLORIDE SERPL-SCNC: 100 MMOL/L (ref 98–107)
CO2 SERPL-SCNC: 25 MMOL/L (ref 22–29)
CREAT SERPL-MCNC: 1.6 MG/DL (ref 0.5–1)
ERYTHROCYTE [DISTWIDTH] IN BLOOD BY AUTOMATED COUNT: 12.2 % (ref 11.5–15)
GFR SERPL CREATININE-BSD FRML MDRD: 36 ML/MIN/1.73M2
GLUCOSE BLD-MCNC: 156 MG/DL (ref 74–99)
GLUCOSE BLD-MCNC: 169 MG/DL (ref 74–99)
GLUCOSE BLD-MCNC: 215 MG/DL (ref 74–99)
GLUCOSE SERPL-MCNC: 160 MG/DL (ref 74–99)
HCT VFR BLD AUTO: 36.2 % (ref 34–48)
HGB BLD-MCNC: 11.6 G/DL (ref 11.5–15.5)
MAGNESIUM SERPL-MCNC: 1.9 MG/DL (ref 1.6–2.6)
MCH RBC QN AUTO: 31.4 PG (ref 26–35)
MCHC RBC AUTO-ENTMCNC: 32 G/DL (ref 32–34.5)
MCV RBC AUTO: 98.1 FL (ref 80–99.9)
PLATELET # BLD AUTO: 307 K/UL (ref 130–450)
PMV BLD AUTO: 10.6 FL (ref 7–12)
POTASSIUM SERPL-SCNC: 3.7 MMOL/L (ref 3.5–5)
RBC # BLD AUTO: 3.69 M/UL (ref 3.5–5.5)
SODIUM SERPL-SCNC: 136 MMOL/L (ref 132–146)
WBC OTHER # BLD: 6.7 K/UL (ref 4.5–11.5)

## 2024-02-17 PROCEDURE — 2700000000 HC OXYGEN THERAPY PER DAY

## 2024-02-17 PROCEDURE — 83735 ASSAY OF MAGNESIUM: CPT

## 2024-02-17 PROCEDURE — 2060000000 HC ICU INTERMEDIATE R&B

## 2024-02-17 PROCEDURE — 94640 AIRWAY INHALATION TREATMENT: CPT

## 2024-02-17 PROCEDURE — 6360000002 HC RX W HCPCS: Performed by: STUDENT IN AN ORGANIZED HEALTH CARE EDUCATION/TRAINING PROGRAM

## 2024-02-17 PROCEDURE — 6370000000 HC RX 637 (ALT 250 FOR IP): Performed by: NURSE PRACTITIONER

## 2024-02-17 PROCEDURE — 2580000003 HC RX 258: Performed by: STUDENT IN AN ORGANIZED HEALTH CARE EDUCATION/TRAINING PROGRAM

## 2024-02-17 PROCEDURE — 99233 SBSQ HOSP IP/OBS HIGH 50: CPT | Performed by: INTERNAL MEDICINE

## 2024-02-17 PROCEDURE — 6370000000 HC RX 637 (ALT 250 FOR IP): Performed by: INTERNAL MEDICINE

## 2024-02-17 PROCEDURE — 85027 COMPLETE CBC AUTOMATED: CPT

## 2024-02-17 PROCEDURE — 6370000000 HC RX 637 (ALT 250 FOR IP): Performed by: STUDENT IN AN ORGANIZED HEALTH CARE EDUCATION/TRAINING PROGRAM

## 2024-02-17 PROCEDURE — 99232 SBSQ HOSP IP/OBS MODERATE 35: CPT | Performed by: INTERNAL MEDICINE

## 2024-02-17 PROCEDURE — 82962 GLUCOSE BLOOD TEST: CPT

## 2024-02-17 PROCEDURE — 97161 PT EVAL LOW COMPLEX 20 MIN: CPT

## 2024-02-17 PROCEDURE — 80048 BASIC METABOLIC PNL TOTAL CA: CPT

## 2024-02-17 PROCEDURE — 6360000002 HC RX W HCPCS: Performed by: NURSE PRACTITIONER

## 2024-02-17 PROCEDURE — 94660 CPAP INITIATION&MGMT: CPT

## 2024-02-17 RX ORDER — HYDROCODONE BITARTRATE AND ACETAMINOPHEN 5; 325 MG/1; MG/1
1 TABLET ORAL EVERY 6 HOURS PRN
Status: DISCONTINUED | OUTPATIENT
Start: 2024-02-17 | End: 2024-02-18 | Stop reason: HOSPADM

## 2024-02-17 RX ORDER — ONDANSETRON 2 MG/ML
4 INJECTION INTRAMUSCULAR; INTRAVENOUS EVERY 6 HOURS PRN
Status: DISCONTINUED | OUTPATIENT
Start: 2024-02-17 | End: 2024-02-18 | Stop reason: HOSPADM

## 2024-02-17 RX ORDER — CYCLOBENZAPRINE HCL 10 MG
10 TABLET ORAL ONCE
Status: COMPLETED | OUTPATIENT
Start: 2024-02-17 | End: 2024-02-17

## 2024-02-17 RX ORDER — MINOXIDIL 2.5 MG/1
2.5 TABLET ORAL 2 TIMES DAILY
Status: DISCONTINUED | OUTPATIENT
Start: 2024-02-17 | End: 2024-02-18 | Stop reason: HOSPADM

## 2024-02-17 RX ORDER — POTASSIUM CHLORIDE 20 MEQ/1
40 TABLET, EXTENDED RELEASE ORAL ONCE
Status: COMPLETED | OUTPATIENT
Start: 2024-02-17 | End: 2024-02-17

## 2024-02-17 RX ORDER — CALCIUM CARBONATE 500 MG/1
500 TABLET, CHEWABLE ORAL 3 TIMES DAILY PRN
Status: DISCONTINUED | OUTPATIENT
Start: 2024-02-17 | End: 2024-02-18 | Stop reason: HOSPADM

## 2024-02-17 RX ADMIN — INSULIN LISPRO 2 UNITS: 100 INJECTION, SOLUTION INTRAVENOUS; SUBCUTANEOUS at 10:48

## 2024-02-17 RX ADMIN — ATORVASTATIN CALCIUM 10 MG: 10 TABLET, FILM COATED ORAL at 09:13

## 2024-02-17 RX ADMIN — SODIUM CHLORIDE, PRESERVATIVE FREE 10 ML: 5 INJECTION INTRAVENOUS at 21:02

## 2024-02-17 RX ADMIN — CALCIUM CARBONATE (ANTACID) CHEW TAB 500 MG 500 MG: 500 CHEW TAB at 18:49

## 2024-02-17 RX ADMIN — BUDESONIDE 500 MCG: 0.5 SUSPENSION RESPIRATORY (INHALATION) at 20:28

## 2024-02-17 RX ADMIN — FLUTICASONE PROPIONATE 2 SPRAY: 50 SPRAY, METERED NASAL at 09:12

## 2024-02-17 RX ADMIN — HYDROCODONE BITARTRATE AND ACETAMINOPHEN 1 TABLET: 5; 325 TABLET ORAL at 18:48

## 2024-02-17 RX ADMIN — DULOXETINE HYDROCHLORIDE 60 MG: 60 CAPSULE, DELAYED RELEASE ORAL at 09:14

## 2024-02-17 RX ADMIN — TRAZODONE HYDROCHLORIDE 150 MG: 50 TABLET ORAL at 20:52

## 2024-02-17 RX ADMIN — ONDANSETRON 4 MG: 2 INJECTION INTRAMUSCULAR; INTRAVENOUS at 22:22

## 2024-02-17 RX ADMIN — HYDROCODONE BITARTRATE AND ACETAMINOPHEN 1 TABLET: 5; 325 TABLET ORAL at 23:55

## 2024-02-17 RX ADMIN — BUDESONIDE 500 MCG: 0.5 SUSPENSION RESPIRATORY (INHALATION) at 07:43

## 2024-02-17 RX ADMIN — HYDROCODONE BITARTRATE AND ACETAMINOPHEN 1 TABLET: 5; 325 TABLET ORAL at 06:18

## 2024-02-17 RX ADMIN — HYDROCODONE BITARTRATE AND ACETAMINOPHEN 1 TABLET: 5; 325 TABLET ORAL at 11:56

## 2024-02-17 RX ADMIN — SACUBITRIL AND VALSARTAN 1 TABLET: 97; 103 TABLET, FILM COATED ORAL at 09:13

## 2024-02-17 RX ADMIN — DOCUSATE SODIUM 100 MG: 100 CAPSULE, LIQUID FILLED ORAL at 09:14

## 2024-02-17 RX ADMIN — HYDRALAZINE HYDROCHLORIDE 100 MG: 50 TABLET ORAL at 06:18

## 2024-02-17 RX ADMIN — GABAPENTIN 100 MG: 100 CAPSULE ORAL at 20:52

## 2024-02-17 RX ADMIN — GUAIFENESIN 400 MG: 400 TABLET ORAL at 20:52

## 2024-02-17 RX ADMIN — ISOSORBIDE MONONITRATE 60 MG: 60 TABLET, EXTENDED RELEASE ORAL at 09:13

## 2024-02-17 RX ADMIN — GUAIFENESIN 400 MG: 400 TABLET ORAL at 11:56

## 2024-02-17 RX ADMIN — CYCLOBENZAPRINE 10 MG: 10 TABLET, FILM COATED ORAL at 20:52

## 2024-02-17 RX ADMIN — Medication 1000 UNITS: at 09:13

## 2024-02-17 RX ADMIN — OXYBUTYNIN CHLORIDE 15 MG: 5 TABLET, EXTENDED RELEASE ORAL at 09:13

## 2024-02-17 RX ADMIN — HYDRALAZINE HYDROCHLORIDE 100 MG: 50 TABLET ORAL at 20:56

## 2024-02-17 RX ADMIN — MINOXIDIL 5 MG: 2.5 TABLET ORAL at 09:14

## 2024-02-17 RX ADMIN — POLYETHYLENE GLYCOL 3350 17 G: 17 POWDER, FOR SOLUTION ORAL at 09:12

## 2024-02-17 RX ADMIN — PANTOPRAZOLE SODIUM 40 MG: 40 TABLET, DELAYED RELEASE ORAL at 09:13

## 2024-02-17 RX ADMIN — ACETAMINOPHEN 650 MG: 325 TABLET ORAL at 09:24

## 2024-02-17 RX ADMIN — HEPARIN SODIUM 5000 UNITS: 10000 INJECTION INTRAVENOUS; SUBCUTANEOUS at 06:18

## 2024-02-17 RX ADMIN — SODIUM CHLORIDE, PRESERVATIVE FREE 10 ML: 5 INJECTION INTRAVENOUS at 09:14

## 2024-02-17 RX ADMIN — ARFORMOTEROL TARTRATE 15 MCG: 15 SOLUTION RESPIRATORY (INHALATION) at 20:28

## 2024-02-17 RX ADMIN — MONTELUKAST SODIUM 10 MG: 10 TABLET ORAL at 20:52

## 2024-02-17 RX ADMIN — HEPARIN SODIUM 5000 UNITS: 10000 INJECTION INTRAVENOUS; SUBCUTANEOUS at 14:40

## 2024-02-17 RX ADMIN — GUAIFENESIN 400 MG: 400 TABLET ORAL at 18:47

## 2024-02-17 RX ADMIN — ASPIRIN 81 MG CHEWABLE TABLET 81 MG: 81 TABLET CHEWABLE at 09:13

## 2024-02-17 RX ADMIN — ARFORMOTEROL TARTRATE 15 MCG: 15 SOLUTION RESPIRATORY (INHALATION) at 07:42

## 2024-02-17 RX ADMIN — INSULIN GLARGINE 8 UNITS: 100 INJECTION, SOLUTION SUBCUTANEOUS at 21:02

## 2024-02-17 RX ADMIN — POTASSIUM CHLORIDE 40 MEQ: 1500 TABLET, EXTENDED RELEASE ORAL at 09:13

## 2024-02-17 RX ADMIN — GUAIFENESIN 400 MG: 400 TABLET ORAL at 09:13

## 2024-02-17 RX ADMIN — MINOXIDIL 2.5 MG: 2.5 TABLET ORAL at 20:52

## 2024-02-17 RX ADMIN — GABAPENTIN 100 MG: 100 CAPSULE ORAL at 09:13

## 2024-02-17 ASSESSMENT — PAIN DESCRIPTION - LOCATION
LOCATION: BACK
LOCATION: FLANK

## 2024-02-17 ASSESSMENT — PAIN SCALES - GENERAL
PAINLEVEL_OUTOF10: 0
PAINLEVEL_OUTOF10: 0
PAINLEVEL_OUTOF10: 8
PAINLEVEL_OUTOF10: 9
PAINLEVEL_OUTOF10: 5

## 2024-02-17 ASSESSMENT — PAIN SCALES - WONG BAKER
WONGBAKER_NUMERICALRESPONSE: 0
WONGBAKER_NUMERICALRESPONSE: 0

## 2024-02-17 ASSESSMENT — PAIN DESCRIPTION - DESCRIPTORS
DESCRIPTORS: ACHING
DESCRIPTORS: THROBBING;PRESSURE

## 2024-02-17 ASSESSMENT — PAIN DESCRIPTION - ORIENTATION: ORIENTATION: RIGHT

## 2024-02-17 ASSESSMENT — PAIN - FUNCTIONAL ASSESSMENT: PAIN_FUNCTIONAL_ASSESSMENT: ACTIVITIES ARE NOT PREVENTED

## 2024-02-18 VITALS
BODY MASS INDEX: 32.91 KG/M2 | TEMPERATURE: 97.7 F | SYSTOLIC BLOOD PRESSURE: 113 MMHG | DIASTOLIC BLOOD PRESSURE: 56 MMHG | OXYGEN SATURATION: 93 % | RESPIRATION RATE: 18 BRPM | HEART RATE: 110 BPM | HEIGHT: 62 IN

## 2024-02-18 LAB
ANION GAP SERPL CALCULATED.3IONS-SCNC: 10 MMOL/L (ref 7–16)
BUN SERPL-MCNC: 24 MG/DL (ref 6–23)
CALCIUM SERPL-MCNC: 9.4 MG/DL (ref 8.6–10.2)
CHLORIDE SERPL-SCNC: 98 MMOL/L (ref 98–107)
CO2 SERPL-SCNC: 27 MMOL/L (ref 22–29)
CREAT SERPL-MCNC: 1.5 MG/DL (ref 0.5–1)
EKG ATRIAL RATE: 73 BPM
EKG P AXIS: 59 DEGREES
EKG P-R INTERVAL: 200 MS
EKG Q-T INTERVAL: 474 MS
EKG QRS DURATION: 158 MS
EKG QTC CALCULATION (BAZETT): 522 MS
EKG R AXIS: 39 DEGREES
EKG T AXIS: 13 DEGREES
EKG VENTRICULAR RATE: 73 BPM
ERYTHROCYTE [DISTWIDTH] IN BLOOD BY AUTOMATED COUNT: 12.3 % (ref 11.5–15)
GFR SERPL CREATININE-BSD FRML MDRD: 37 ML/MIN/1.73M2
GLUCOSE BLD-MCNC: 168 MG/DL (ref 74–99)
GLUCOSE BLD-MCNC: 170 MG/DL (ref 74–99)
GLUCOSE SERPL-MCNC: 153 MG/DL (ref 74–99)
HCT VFR BLD AUTO: 31.8 % (ref 34–48)
HGB BLD-MCNC: 10 G/DL (ref 11.5–15.5)
MCH RBC QN AUTO: 31.1 PG (ref 26–35)
MCHC RBC AUTO-ENTMCNC: 31.4 G/DL (ref 32–34.5)
MCV RBC AUTO: 98.8 FL (ref 80–99.9)
PLATELET # BLD AUTO: 261 K/UL (ref 130–450)
PMV BLD AUTO: 10.5 FL (ref 7–12)
POTASSIUM SERPL-SCNC: 4.4 MMOL/L (ref 3.5–5)
RBC # BLD AUTO: 3.22 M/UL (ref 3.5–5.5)
SODIUM SERPL-SCNC: 135 MMOL/L (ref 132–146)
WBC OTHER # BLD: 5.4 K/UL (ref 4.5–11.5)

## 2024-02-18 PROCEDURE — 6360000002 HC RX W HCPCS: Performed by: STUDENT IN AN ORGANIZED HEALTH CARE EDUCATION/TRAINING PROGRAM

## 2024-02-18 PROCEDURE — 93010 ELECTROCARDIOGRAM REPORT: CPT | Performed by: INTERNAL MEDICINE

## 2024-02-18 PROCEDURE — 94660 CPAP INITIATION&MGMT: CPT

## 2024-02-18 PROCEDURE — 82962 GLUCOSE BLOOD TEST: CPT

## 2024-02-18 PROCEDURE — 2700000000 HC OXYGEN THERAPY PER DAY

## 2024-02-18 PROCEDURE — 85027 COMPLETE CBC AUTOMATED: CPT

## 2024-02-18 PROCEDURE — 6370000000 HC RX 637 (ALT 250 FOR IP): Performed by: INTERNAL MEDICINE

## 2024-02-18 PROCEDURE — 2580000003 HC RX 258: Performed by: STUDENT IN AN ORGANIZED HEALTH CARE EDUCATION/TRAINING PROGRAM

## 2024-02-18 PROCEDURE — 99232 SBSQ HOSP IP/OBS MODERATE 35: CPT | Performed by: INTERNAL MEDICINE

## 2024-02-18 PROCEDURE — 94640 AIRWAY INHALATION TREATMENT: CPT

## 2024-02-18 PROCEDURE — 99239 HOSP IP/OBS DSCHRG MGMT >30: CPT | Performed by: INTERNAL MEDICINE

## 2024-02-18 PROCEDURE — 80048 BASIC METABOLIC PNL TOTAL CA: CPT

## 2024-02-18 PROCEDURE — 6370000000 HC RX 637 (ALT 250 FOR IP): Performed by: STUDENT IN AN ORGANIZED HEALTH CARE EDUCATION/TRAINING PROGRAM

## 2024-02-18 RX ADMIN — DOCUSATE SODIUM 100 MG: 100 CAPSULE, LIQUID FILLED ORAL at 09:02

## 2024-02-18 RX ADMIN — GUAIFENESIN 400 MG: 400 TABLET ORAL at 09:02

## 2024-02-18 RX ADMIN — GUAIFENESIN 400 MG: 400 TABLET ORAL at 14:31

## 2024-02-18 RX ADMIN — MINOXIDIL 2.5 MG: 2.5 TABLET ORAL at 09:02

## 2024-02-18 RX ADMIN — HYDROCODONE BITARTRATE AND ACETAMINOPHEN 1 TABLET: 5; 325 TABLET ORAL at 16:10

## 2024-02-18 RX ADMIN — Medication 1000 UNITS: at 09:02

## 2024-02-18 RX ADMIN — SACUBITRIL AND VALSARTAN 1 TABLET: 97; 103 TABLET, FILM COATED ORAL at 09:02

## 2024-02-18 RX ADMIN — SODIUM CHLORIDE, PRESERVATIVE FREE 10 ML: 5 INJECTION INTRAVENOUS at 10:07

## 2024-02-18 RX ADMIN — HYDRALAZINE HYDROCHLORIDE 100 MG: 50 TABLET ORAL at 06:48

## 2024-02-18 RX ADMIN — PANTOPRAZOLE SODIUM 40 MG: 40 TABLET, DELAYED RELEASE ORAL at 06:48

## 2024-02-18 RX ADMIN — FLUTICASONE PROPIONATE 2 SPRAY: 50 SPRAY, METERED NASAL at 09:09

## 2024-02-18 RX ADMIN — ATORVASTATIN CALCIUM 10 MG: 10 TABLET, FILM COATED ORAL at 09:02

## 2024-02-18 RX ADMIN — SILVER SULFADIAZINE: 10 CREAM TOPICAL at 14:15

## 2024-02-18 RX ADMIN — POLYETHYLENE GLYCOL 3350 17 G: 17 POWDER, FOR SOLUTION ORAL at 09:01

## 2024-02-18 RX ADMIN — ASPIRIN 81 MG CHEWABLE TABLET 81 MG: 81 TABLET CHEWABLE at 09:02

## 2024-02-18 RX ADMIN — ARFORMOTEROL TARTRATE 15 MCG: 15 SOLUTION RESPIRATORY (INHALATION) at 10:45

## 2024-02-18 RX ADMIN — GUAIFENESIN 400 MG: 400 TABLET ORAL at 16:09

## 2024-02-18 RX ADMIN — GABAPENTIN 100 MG: 100 CAPSULE ORAL at 09:02

## 2024-02-18 RX ADMIN — HEPARIN SODIUM 5000 UNITS: 10000 INJECTION INTRAVENOUS; SUBCUTANEOUS at 06:48

## 2024-02-18 RX ADMIN — HEPARIN SODIUM 5000 UNITS: 10000 INJECTION INTRAVENOUS; SUBCUTANEOUS at 14:31

## 2024-02-18 RX ADMIN — DULOXETINE HYDROCHLORIDE 60 MG: 60 CAPSULE, DELAYED RELEASE ORAL at 09:02

## 2024-02-18 RX ADMIN — BUDESONIDE 500 MCG: 0.5 SUSPENSION RESPIRATORY (INHALATION) at 10:45

## 2024-02-18 RX ADMIN — ISOSORBIDE MONONITRATE 60 MG: 60 TABLET, EXTENDED RELEASE ORAL at 09:02

## 2024-02-18 RX ADMIN — OXYBUTYNIN CHLORIDE 15 MG: 5 TABLET, EXTENDED RELEASE ORAL at 09:02

## 2024-02-18 RX ADMIN — HYDROCODONE BITARTRATE AND ACETAMINOPHEN 1 TABLET: 5; 325 TABLET ORAL at 09:09

## 2024-02-18 ASSESSMENT — PAIN - FUNCTIONAL ASSESSMENT: PAIN_FUNCTIONAL_ASSESSMENT: ACTIVITIES ARE NOT PREVENTED

## 2024-02-18 ASSESSMENT — PAIN DESCRIPTION - ORIENTATION: ORIENTATION: RIGHT

## 2024-02-18 ASSESSMENT — PAIN SCALES - GENERAL: PAINLEVEL_OUTOF10: 8

## 2024-02-18 ASSESSMENT — PAIN DESCRIPTION - LOCATION: LOCATION: FLANK

## 2024-02-18 NOTE — PROGRESS NOTES
[facility] Cardiology Inpatient Progress Note    Patient is a 68 y.o. female of Vince Beatty APRN - CNP seen in hospital follow up.     Chief complaint: AVB/Pause/CHF    HPI: Some SOB. No CP.     Patient Active Problem List   Diagnosis    Severe obesity (BMI 35.0-35.9 with comorbidity) ([facility])    Hyperlipidemia    DAYAN and COPD overlap syndrome ([facility])    Vitamin D insufficiency    Chronic combined systolic and diastolic heart failure ([facility])    GERD (gastroesophageal reflux disease)    Major depressive disorder, recurrent episode, mild ([facility])    Diabetic polyneuropathy associated with type 2 diabetes mellitus ([facility])    Chronic passive hepatic congestion    Mixed incontinence urge and stress    Chronic back pain - d/t muscle spasm    Glaucoma, open angle    Elevated CA 19-9 level    Lumbar stenosis    Spondylosis 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    Lumbar disc herniation    Asymmetric septal hypertrophy ([facility])    Non-compliance    Hiatal hernia    Melanosis coli    Coronary artery disease involving native coronary artery of native heart without angina pectoris    DM2 (diabetes mellitus, type 2) ([facility])    Cigarette smoker    Marijuana use, smoked    Ischemic cardiomyopathy    Encounter for tobacco use cessation counseling    PVD (peripheral vascular disease) with claudication ([facility])    Tobacco abuse    Chronic venous insufficiency    History of non-ST elevation myocardial infarction (NSTEMI)    Prolonged Q-T interval on ECG    History of stroke    COPD exacerbation ([facility])    Chronic respiratory failure with hypoxia and hypercapnia ([facility])    COPD (chronic obstructive pulmonary disease) ([facility])    Status post peripheral artery angioplasty    Uncontrolled hypertension    O2 dependent    Respiratory distress    Acute on chronic diastolic (congestive) heart failure ([facility])    CKD (chronic kidney disease) stage 3, GFR 30-59 ml/min ([facility])    Hypertensive emergency    Vocal cord mass    Tobacco dependence    
      [facility] Cardiology Inpatient Progress Note    Patient is a 68 y.o. female of Vince Beatty APRN - CNP seen in hospital follow up.     Chief complaint: AVB/Pause/CHF    HPI: Some SOB. No CP.     Patient Active Problem List   Diagnosis    Severe obesity (BMI 35.0-35.9 with comorbidity) ([facility])    Hyperlipidemia    DAYAN and COPD overlap syndrome ([facility])    Vitamin D insufficiency    Chronic combined systolic and diastolic heart failure ([facility])    GERD (gastroesophageal reflux disease)    Major depressive disorder, recurrent episode, mild ([facility])    Diabetic polyneuropathy associated with type 2 diabetes mellitus ([facility])    Chronic passive hepatic congestion    Mixed incontinence urge and stress    Chronic back pain - d/t muscle spasm    Glaucoma, open angle    Elevated CA 19-9 level    Lumbar stenosis    Spondylosis 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    Lumbar disc herniation    Asymmetric septal hypertrophy ([facility])    Non-compliance    Hiatal hernia    Melanosis coli    Coronary artery disease involving native coronary artery of native heart without angina pectoris    DM2 (diabetes mellitus, type 2) ([facility])    Cigarette smoker    Marijuana use, smoked    Ischemic cardiomyopathy    Encounter for tobacco use cessation counseling    PVD (peripheral vascular disease) with claudication ([facility])    Tobacco abuse    Chronic venous insufficiency    History of non-ST elevation myocardial infarction (NSTEMI)    Prolonged Q-T interval on ECG    History of stroke    COPD exacerbation ([facility])    Chronic respiratory failure with hypoxia and hypercapnia ([facility])    COPD (chronic obstructive pulmonary disease) ([facility])    Status post peripheral artery angioplasty    Uncontrolled hypertension    O2 dependent    Respiratory distress    Acute on chronic diastolic (congestive) heart failure ([facility])    CKD (chronic kidney disease) stage 3, GFR 30-59 ml/min ([facility])    Hypertensive emergency    Vocal cord mass    Tobacco dependence    
      [facility] Cardiology Inpatient Progress Note    Patient is a 68 y.o. female of Vince Beatty APRN - CNP seen in hospital follow up.     Chief complaint: AVB/Pause/CHF    HPI: Some SOB. No CP.     Patient Active Problem List   Diagnosis    Severe obesity (BMI 35.0-35.9 with comorbidity) ([facility])    Hyperlipidemia    DAYAN and COPD overlap syndrome ([facility])    Vitamin D insufficiency    Chronic combined systolic and diastolic heart failure ([facility])    GERD (gastroesophageal reflux disease)    Major depressive disorder, recurrent episode, mild ([facility])    Diabetic polyneuropathy associated with type 2 diabetes mellitus ([facility])    Chronic passive hepatic congestion    Mixed incontinence urge and stress    Chronic back pain - d/t muscle spasm    Glaucoma, open angle    Elevated CA 19-9 level    Lumbar stenosis    Spondylosis 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    Lumbar disc herniation    Asymmetric septal hypertrophy ([facility])    Non-compliance    Hiatal hernia    Melanosis coli    Coronary artery disease involving native coronary artery of native heart without angina pectoris    DM2 (diabetes mellitus, type 2) ([facility])    Cigarette smoker    Marijuana use, smoked    Ischemic cardiomyopathy    Encounter for tobacco use cessation counseling    PVD (peripheral vascular disease) with claudication ([facility])    Tobacco abuse    Chronic venous insufficiency    History of non-ST elevation myocardial infarction (NSTEMI)    Prolonged Q-T interval on ECG    History of stroke    COPD exacerbation ([facility])    Chronic respiratory failure with hypoxia and hypercapnia ([facility])    COPD (chronic obstructive pulmonary disease) ([facility])    Status post peripheral artery angioplasty    Uncontrolled hypertension    O2 dependent    Respiratory distress    Acute on chronic diastolic (congestive) heart failure ([facility])    CKD (chronic kidney disease) stage 3, GFR 30-59 ml/min ([facility])    Hypertensive emergency    Vocal cord mass    Tobacco dependence    
4 Eyes Skin Assessment     NAME:  Steph Gonzáles  YOB: 1955  MEDICAL RECORD NUMBER:  [id number]    The patient is being assessed for  Admission    I agree that at least one RN has performed a thorough Head to Toe Skin Assessment on the patient. ALL assessment sites listed below have been assessed.      Areas assessed by both nurses:    Head, Face, Ears, Shoulders, Back, Chest, Arms, Elbows, Hands, Sacrum. Buttock, Coccyx, Ischium, and Legs. Feet and Heels        Does the Patient have a Wound? Yes wound(s) were present on assessment. LDA wound assessment was Initiated and completed by RN       Jadon Prevention initiated by RN: Yes  Wound Care Orders initiated by RN: Yes    Pressure Injury (Stage 3,4, Unstageable, DTI, NWPT, and Complex wounds) if present, place Wound referral order by RN under : No    New Ostomies, if present place, Ostomy referral order under : No     Nurse 1 eSignature: Electronically signed by Xochilt John RN on 2/14/24 at 11:08 PM EST    **SHARE this note so that the co-signing nurse can place an eSignature**    Nurse 2 eSignature: {Esignature:[id number]}   
Education provided on safety and reason for bed and chair alarm. Patient continued to get up without assistant. Patient signed bed and chair alarm waiver.  
Initial Inpatient Wound Care    Admit Date: 2/14/2024 10:11 PM    Reason for consult:  R breast and R leg wound. sees home wound care nurse    Significant history:  CAD w/ CABG, HFpEF, COPD, DAYAN on Trelegy, severe PVD, HTN, HLD, DM, CKD3a who p/w 4 day hx of LE edema and feet swelling     Wound history:  POA, has home care    Findings:  alert and oriented. Sitting in chair  Rt lower leg wound    1x1 puple area open. No drainage  Periwound erythema. States from bed last time she was in hospital when legs were very swollen.    Rt breast- [place] of elevated scarring. States uses silvadene on that also.    No need for silvadene to breast at this time      Plan:foam to rt breast  Silveadene to leg, tubigrip  Radha Bowen, CNS, Wound Care        Radha Bowen RN 2/16/2024 12:28 PM      
Nurse spoke with Cardiology, Dr. Gardner. Pacer pads placed on patient, atropine at bedside, and BMP and Mag labs drawn.  
Patient had a 8.67 second ventricular pause. Cardiology consulted. Dr. Gardner contacted. Awaiting response.  
Physical Therapy  Facility/Department: [address] SURG  Physical Therapy Initial Assessment    Name: Steph Gonzáles  : 1955  MRN: [id number]  Date of Service: 2024      Patient Diagnosis(es): The primary encounter diagnosis was Ventricular asystolia (HCC). A diagnosis of Mobitz II was also pertinent to this visit.  Past Medical History:  has a past medical history of Asthma, Atherosclerosis of native artery of left leg with rest pain (HCC), CAD (coronary artery disease), Cellulitis and abscess of trunk, Cerebellar infarct (HCC), Chronic back pain, Chronic kidney disease, Chronic systolic congestive heart failure (HCC), Chronic venous insufficiency, COPD (chronic obstructive pulmonary disease) (HCC), COVID-19, Depression, Diabetes mellitus (HCC), Diabetic neuropathy (HCC), Diverticulosis, Fatty liver, Glaucoma, open angle, Hepatic encephalopathy (HCC), Hiatal hernia, History of blood transfusion, Hyperlipidemia, Hyperplastic colon polyp, Hypertension, Incontinence, Liver mass, Morbid obesity (HCC), Movement disorder, Myocardial infarction (HCC), O2 dependent, Osteoarthritis, generalized, Pain in both lower legs, Peripheral vascular disease (HCC), Pneumonia, Pulmonary edema, PVD (peripheral vascular disease) with claudication (HCC), Sleep apnea, Status post peripheral artery angioplasty, Tobacco abuse, Tobacco abuse, Tobacco dependence, Tubular adenoma polyp of rectum, Urinary tract infection due to ESBL Klebsiella, and Ventral hernia.  Past Surgical History:  has a past surgical history that includes knee surgery; Upper gastrointestinal endoscopy (12); Coronary artery bypass graft; Layered wound closure (Left, [id number]); Echo Complete (10/9/2013); polysomnography (2016); liver biopsy (2016); bronchial brush biopsy (2013); Breast surgery (); Cholecystectomy (YRS AGO); Cardiac surgery (2011); Cardiac catheterization (2015); Hysterectomy; joint replacement (); Upper 
Unable to complete visit while rounding Patient  is off unit currently.  remains available for support.  
chloride, , PRN  potassium chloride, 40 mEq, PRN   Or  potassium alternative oral replacement, 40 mEq, PRN   Or  potassium chloride, 10 mEq, PRN  magnesium sulfate, 2,000 mg, PRN  dextrose bolus, 125 mL, PRN   Or  dextrose bolus, 250 mL, PRN  glucagon (rDNA), 1 mg, PRN  dextrose, , Continuous PRN  Glucose, 15 g, PRN  atropine, 1 mg, PRN         Objective:    BP (!) 150/76   Pulse (!) 109   Temp 98 °F (36.7 °C) (Oral)   Resp 18   Ht 1.575 m (5' 2.01\")   LMP 01/01/1990   SpO2 95%   BMI 32.91 kg/m²     General Appearance: alert and oriented to person, place and time and in no acute distress  Skin: warm and dry  Head: normocephalic and atraumatic  Eyes: pupils equal, round, and reactive to light, extraocular eye movements intact, conjunctivae normal  Neck: neck supple and non tender without mass   Pulmonary/Chest: clear to auscultation bilaterally- no wheezes, rales or rhonchi, normal air movement, no respiratory distress  Cardiovascular: normal rate, normal S1 and S2 and no carotid bruits  Abdomen: soft, non-tender, non-distended, normal bowel sounds, no masses or organomegaly  Extremities: no cyanosis, no clubbing and no edema. Small wound on R anterior shin, red and tender without warmth or drainage  Neurologic: no cranial nerve deficit and speech normal        Recent Labs     02/14/24  1650 02/15/24  0525 02/16/24  0602    138 135   K 4.2 3.9 3.7   * 105 102   CO2 22 25 22   BUN 23 21 21   CREATININE 1.3* 1.3* 1.4*   GLUCOSE 186* 188* 154*   CALCIUM 9.3 9.1 9.1       Recent Labs     02/14/24  1650 02/15/24  0525 02/16/24  0602   WBC 8.0 7.3 6.5   RBC 3.56 3.29* 3.44*   HGB 11.2* 10.4* 10.8*   HCT 34.3 31.8* 33.9*   MCV 96.3 96.7 98.5   MCH 31.5 31.6 31.4   MCHC 32.7 32.7 31.9*   RDW 12.4 12.3 12.3    258 276   MPV 10.6 10.6 10.5         Assessment and Plan:     Principal Problem:    Acute on chronic diastolic (congestive) heart failure (HCC)  Active Problems:    Right facial swelling    
Problems:    Acute on chronic systolic (congestive) heart failure (HCC)    Acute on chronic systolic heart failure with recovered EF.  Last echo in December showed EF 60 to 65%.  Continue Entresto, hydralazine, nitrate.  Noted pleural effusions on x-ray, lower extremity edema, elevated proBNP at 3800.  Strict I's and O's.  Daily weights.  Cardiology following.  Improving.  Hold IV Bumex due to elevated creatinine  Shortness of breath and hypoxia.  2/2 #1.  Remains off oxygen.  Hold diuresis due to elevated creatinine  History of RBBB and high-grade AV block.  Did have an 8-second pause overnight.  Likely due to poorly treated DAYAN.  Avoid tyrese blocking agents.  Hold clonidine.  14-day monitor on discharge per cardio  DM2.  Continue MD Christiano SS.  Hypoglycemia protocol.  Carb controlled diet  Lower abdominal pain.  Likely 2/2 constipation vs cystitis vs edema from CHF.  Bowel regimen.  Improved  CAD.  S/p CABG in 2011.  Continue GDMT  Hypertension.  Uncontrolled.  Titrate medications per cardio    Likely DC home tomorrow    Time spent reviewing chart, clinical exam, discussing case and answering questions with staff/consultants/patient/family aprox 35 mins.     NOTE: This report was transcribed using voice recognition software. Every effort was made to ensure accuracy; however, inadvertent computerized transcription errors may be present.  Electronically signed by Santana Davis MD on 2/17/2024 at 3:41 PM

## 2024-02-18 NOTE — DISCHARGE SUMMARY
[facility] Hospitalist Physician Discharge Summary       [facility]  [address] [place] [address], [address]  [facility] [address]  [phone]          Vince Beatty APRN [address]  [facility] [address] [phone]    Schedule an appointment as soon as possible for a visit in 1 week(s)  hospital follow up      Activity level: As tolerated     Dispo: Home      Condition on discharge: Stable     Patient ID:  Steph Gonzáles  [id number]  68 y.o.  1955    Admit date: 2/14/2024    Discharge date and time:  2/18/2024  1:19 PM    Admission Diagnoses: Principal Problem:    Acute on chronic diastolic (congestive) heart failure (HCC)  Active Problems:    Right facial swelling    Abnormal cardiac conduction    Heart block    Ventricular asystolia (HCC)    Acute on chronic diastolic heart failure (HCC)  Resolved Problems:    Acute on chronic systolic (congestive) heart failure (HCC)      Discharge Diagnoses: Principal Problem:    Acute on chronic diastolic (congestive) heart failure (HCC)  Active Problems:    Right facial swelling    Abnormal cardiac conduction    Heart block    Ventricular asystolia (HCC)    Acute on chronic diastolic heart failure (HCC)  Resolved Problems:    Acute on chronic systolic (congestive) heart failure (HCC)      Consults:  IP CONSULT TO CARDIOLOGY  IP CONSULT TO HEART FAILURE NURSE/COORDINATOR    Hospital Course:   Patient Steph Gonzáles is a 68 y.o. presented with Acute on chronic systolic (congestive) heart failure (HCC) [I50.23]    Patient is 68-year-old female who was admitted due to acute on chronic systolic heart failure with recovered EF.  Cardiology was consulted.  proBNP 3800 on admission with lower extremity edema.  Diuresis with IV Lasix twice daily.  She also had shortness of breath and hypoxia likely due to the above.  Improved with diuresis and saturating well on room air now.  Patient will be discharged

## 2024-02-21 ENCOUNTER — TELEPHONE (OUTPATIENT)
Dept: ENT CLINIC | Age: 69
End: 2024-02-21

## 2024-02-21 ENCOUNTER — PROCEDURE VISIT (OUTPATIENT)
Dept: AUDIOLOGY | Age: 69
End: 2024-02-21

## 2024-02-21 DIAGNOSIS — H91.8X3 ASYMMETRICAL HEARING LOSS: Primary | ICD-10-CM

## 2024-02-22 NOTE — PROGRESS NOTES
This patient was referred for audiometric/tympanometric testing by Dr. Diaz due to hearing loss. Patient has hearing aids that she bought from somewhere else. Hearing aids were cleaned and checked and are in working order. Patient was pleased with the current sound. I do not have the software to program the hearing aids at this office but she can be seen on the 7th floor in the hospital as a patient.  I cannot reprogram her hearing aids since she has had a change in hearing and should follow up with the ENT provider first. Patient has an appointment in April to see Dr Diaz.   It was explained to the patient that hearing aid appointments with us would be a self pay charge as she did not purchase the hearing aids from us.     Patient did not seem to know much about the hearing aids - I was going through everything I could as if it was a new fitting. They were 15 mins late for the appointment so we did not get to finish reviewing everything. She was accompanied by an aid. The patient did fairly well with insertion of the hearing aids but had more trouble with the left side. I told the aid, who stated she is with her often, that we could show her how to insert hearing aids and she was on her phone and stated I heard you. She did then attempt to look at how they were seated in her ear. Patient did not know about the battery door. I showed her how to open the battery door and how to change battery.     Patient will need cleaning and care reviewed further at follow up appointments.         Audiometry using pure tone air and bone conduction revealed a moderately severe hearing loss through 500 Hz rising to a mild loss at 2000 Hz sloping back to a moderately severe loss.   Reliability was good. Speech reception thresholds were in good agreement with the pure tone averages, bilaterally. Speech discrimination scores were very poor, in the right ear and poor in the left ear.   Change in hearing since last hearing

## 2024-03-06 ENCOUNTER — APPOINTMENT (OUTPATIENT)
Dept: GENERAL RADIOLOGY | Age: 69
End: 2024-03-06
Payer: MEDICARE

## 2024-03-06 ENCOUNTER — TELEPHONE (OUTPATIENT)
Dept: VASCULAR SURGERY | Age: 69
End: 2024-03-06

## 2024-03-06 ENCOUNTER — HOSPITAL ENCOUNTER (EMERGENCY)
Age: 69
Discharge: HOME | End: 2024-03-06
Attending: STUDENT IN AN ORGANIZED HEALTH CARE EDUCATION/TRAINING PROGRAM
Payer: MEDICARE

## 2024-03-06 ENCOUNTER — OFFICE VISIT (OUTPATIENT)
Dept: VASCULAR SURGERY | Age: 69
End: 2024-03-06
Payer: MEDICARE

## 2024-03-06 VITALS — BODY MASS INDEX: 33.28 KG/M2 | WEIGHT: 182 LBS

## 2024-03-06 VITALS
BODY MASS INDEX: 35.84 KG/M2 | SYSTOLIC BLOOD PRESSURE: 141 MMHG | OXYGEN SATURATION: 97 % | WEIGHT: 196 LBS | TEMPERATURE: 98.1 F | HEART RATE: 69 BPM | RESPIRATION RATE: 18 BRPM | DIASTOLIC BLOOD PRESSURE: 66 MMHG

## 2024-03-06 DIAGNOSIS — I73.9 PVD (PERIPHERAL VASCULAR DISEASE) WITH CLAUDICATION (HCC): ICD-10-CM

## 2024-03-06 DIAGNOSIS — R22.0 FACIAL SWELLING: ICD-10-CM

## 2024-03-06 DIAGNOSIS — N39.0 URINARY TRACT INFECTION WITHOUT HEMATURIA, SITE UNSPECIFIED: ICD-10-CM

## 2024-03-06 DIAGNOSIS — M79.606 PAIN OF LOWER EXTREMITY, UNSPECIFIED LATERALITY: Primary | ICD-10-CM

## 2024-03-06 DIAGNOSIS — Z99.81 O2 DEPENDENT: ICD-10-CM

## 2024-03-06 DIAGNOSIS — I87.2 CHRONIC VENOUS INSUFFICIENCY: ICD-10-CM

## 2024-03-06 DIAGNOSIS — L97.211 LOWER LIMB ULCER, CALF, RIGHT, LIMITED TO BREAKDOWN OF SKIN (HCC): Primary | ICD-10-CM

## 2024-03-06 DIAGNOSIS — F17.200 TOBACCO DEPENDENCE: ICD-10-CM

## 2024-03-06 DIAGNOSIS — Z98.62 STATUS POST PERIPHERAL ARTERY ANGIOPLASTY: ICD-10-CM

## 2024-03-06 DIAGNOSIS — I70.232 ATHEROSCLEROSIS OF NATIVE ARTERY OF RIGHT LOWER EXTREMITY WITH ULCERATION OF CALF (HCC): ICD-10-CM

## 2024-03-06 PROBLEM — N18.9 ACUTE ON CHRONIC RENAL INSUFFICIENCY: Status: RESOLVED | Noted: 2023-12-05 | Resolved: 2024-03-06

## 2024-03-06 PROBLEM — R06.03 RESPIRATORY DISTRESS: Status: RESOLVED | Noted: 2022-04-11 | Resolved: 2024-03-06

## 2024-03-06 PROBLEM — T81.49XA POSTOPERATIVE ABSCESS: Status: RESOLVED | Noted: 2022-08-23 | Resolved: 2024-03-06

## 2024-03-06 PROBLEM — E87.5 HYPERKALEMIA: Status: RESOLVED | Noted: 2023-12-05 | Resolved: 2024-03-06

## 2024-03-06 PROBLEM — N17.9 AKI (ACUTE KIDNEY INJURY) (HCC): Status: RESOLVED | Noted: 2023-12-05 | Resolved: 2024-03-06

## 2024-03-06 PROBLEM — N28.9 ACUTE ON CHRONIC RENAL INSUFFICIENCY: Status: RESOLVED | Noted: 2023-12-05 | Resolved: 2024-03-06

## 2024-03-06 PROBLEM — I16.1 HYPERTENSIVE EMERGENCY: Status: RESOLVED | Noted: 2022-04-12 | Resolved: 2024-03-06

## 2024-03-06 PROBLEM — R07.9 CHEST PAIN: Status: RESOLVED | Noted: 2024-01-31 | Resolved: 2024-03-06

## 2024-03-06 LAB
ALBUMIN SERPL-MCNC: 3.8 G/DL (ref 3.5–5.2)
ALP SERPL-CCNC: 79 U/L (ref 35–104)
ALT SERPL-CCNC: 15 U/L (ref 0–32)
ANION GAP SERPL CALCULATED.3IONS-SCNC: 12 MMOL/L (ref 7–16)
AST SERPL-CCNC: 19 U/L (ref 0–31)
BACTERIA URNS QL MICRO: ABNORMAL
BASOPHILS # BLD: 0.02 K/UL (ref 0–0.2)
BASOPHILS NFR BLD: 0 % (ref 0–2)
BILIRUB SERPL-MCNC: 0.2 MG/DL (ref 0–1.2)
BILIRUB UR QL STRIP: NEGATIVE
BNP SERPL-MCNC: 1585 PG/ML (ref 0–125)
BUN SERPL-MCNC: 27 MG/DL (ref 6–23)
CALCIUM SERPL-MCNC: 9.2 MG/DL (ref 8.6–10.2)
CHLORIDE SERPL-SCNC: 104 MMOL/L (ref 98–107)
CLARITY UR: ABNORMAL
CO2 SERPL-SCNC: 25 MMOL/L (ref 22–29)
COLOR UR: YELLOW
CREAT SERPL-MCNC: 1.5 MG/DL (ref 0.5–1)
EOSINOPHIL # BLD: 0.15 K/UL (ref 0.05–0.5)
EOSINOPHILS RELATIVE PERCENT: 2 % (ref 0–6)
EPI CELLS #/AREA URNS HPF: ABNORMAL /HPF
ERYTHROCYTE [DISTWIDTH] IN BLOOD BY AUTOMATED COUNT: 12.5 % (ref 11.5–15)
GFR SERPL CREATININE-BSD FRML MDRD: 36 ML/MIN/1.73M2
GLUCOSE SERPL-MCNC: 96 MG/DL (ref 74–99)
GLUCOSE UR STRIP-MCNC: NEGATIVE MG/DL
HCT VFR BLD AUTO: 32.2 % (ref 34–48)
HGB BLD-MCNC: 10.6 G/DL (ref 11.5–15.5)
HGB UR QL STRIP.AUTO: NEGATIVE
IMM GRANULOCYTES # BLD AUTO: <0.03 K/UL (ref 0–0.58)
IMM GRANULOCYTES NFR BLD: 0 % (ref 0–5)
KETONES UR STRIP-MCNC: NEGATIVE MG/DL
LEUKOCYTE ESTERASE UR QL STRIP: NEGATIVE
LYMPHOCYTES NFR BLD: 1.15 K/UL (ref 1.5–4)
LYMPHOCYTES RELATIVE PERCENT: 18 % (ref 20–42)
MCH RBC QN AUTO: 31.9 PG (ref 26–35)
MCHC RBC AUTO-ENTMCNC: 32.9 G/DL (ref 32–34.5)
MCV RBC AUTO: 97 FL (ref 80–99.9)
MONOCYTES NFR BLD: 0.29 K/UL (ref 0.1–0.95)
MONOCYTES NFR BLD: 5 % (ref 2–12)
NEUTROPHILS NFR BLD: 74 % (ref 43–80)
NEUTS SEG NFR BLD: 4.74 K/UL (ref 1.8–7.3)
NITRITE UR QL STRIP: POSITIVE
PH UR STRIP: 6.5 [PH] (ref 5–9)
PLATELET # BLD AUTO: 270 K/UL (ref 130–450)
PLATELET CONFIRMATION: NORMAL
PMV BLD AUTO: 10.9 FL (ref 7–12)
POTASSIUM SERPL-SCNC: 5.2 MMOL/L (ref 3.5–5)
PROT SERPL-MCNC: 7.1 G/DL (ref 6.4–8.3)
PROT UR STRIP-MCNC: NEGATIVE MG/DL
RBC # BLD AUTO: 3.32 M/UL (ref 3.5–5.5)
RBC #/AREA URNS HPF: ABNORMAL /HPF
SODIUM SERPL-SCNC: 141 MMOL/L (ref 132–146)
SP GR UR STRIP: <1.005 (ref 1–1.03)
TROPONIN I SERPL HS-MCNC: 17 NG/L (ref 0–9)
TROPONIN I SERPL HS-MCNC: 20 NG/L (ref 0–9)
UROBILINOGEN UR STRIP-ACNC: 0.2 EU/DL (ref 0–1)
WBC #/AREA URNS HPF: ABNORMAL /HPF
WBC OTHER # BLD: 6.4 K/UL (ref 4.5–11.5)

## 2024-03-06 PROCEDURE — 85025 COMPLETE CBC W/AUTO DIFF WBC: CPT

## 2024-03-06 PROCEDURE — 96374 THER/PROPH/DIAG INJ IV PUSH: CPT

## 2024-03-06 PROCEDURE — 99214 OFFICE O/P EST MOD 30 MIN: CPT | Performed by: SURGERY

## 2024-03-06 PROCEDURE — 99285 EMERGENCY DEPT VISIT HI MDM: CPT

## 2024-03-06 PROCEDURE — G8427 DOCREV CUR MEDS BY ELIG CLIN: HCPCS | Performed by: SURGERY

## 2024-03-06 PROCEDURE — G8399 PT W/DXA RESULTS DOCUMENT: HCPCS | Performed by: SURGERY

## 2024-03-06 PROCEDURE — 1123F ACP DISCUSS/DSCN MKR DOCD: CPT | Performed by: SURGERY

## 2024-03-06 PROCEDURE — 80053 COMPREHEN METABOLIC PANEL: CPT

## 2024-03-06 PROCEDURE — 81001 URINALYSIS AUTO W/SCOPE: CPT

## 2024-03-06 PROCEDURE — 93005 ELECTROCARDIOGRAM TRACING: CPT | Performed by: STUDENT IN AN ORGANIZED HEALTH CARE EDUCATION/TRAINING PROGRAM

## 2024-03-06 PROCEDURE — 84484 ASSAY OF TROPONIN QUANT: CPT

## 2024-03-06 PROCEDURE — 3017F COLORECTAL CA SCREEN DOC REV: CPT | Performed by: SURGERY

## 2024-03-06 PROCEDURE — 6360000002 HC RX W HCPCS: Performed by: STUDENT IN AN ORGANIZED HEALTH CARE EDUCATION/TRAINING PROGRAM

## 2024-03-06 PROCEDURE — 1090F PRES/ABSN URINE INCON ASSESS: CPT | Performed by: SURGERY

## 2024-03-06 PROCEDURE — 4004F PT TOBACCO SCREEN RCVD TLK: CPT | Performed by: SURGERY

## 2024-03-06 PROCEDURE — 96375 TX/PRO/DX INJ NEW DRUG ADDON: CPT

## 2024-03-06 PROCEDURE — 83880 ASSAY OF NATRIURETIC PEPTIDE: CPT

## 2024-03-06 PROCEDURE — 1111F DSCHRG MED/CURRENT MED MERGE: CPT | Performed by: SURGERY

## 2024-03-06 PROCEDURE — 71045 X-RAY EXAM CHEST 1 VIEW: CPT

## 2024-03-06 PROCEDURE — 6370000000 HC RX 637 (ALT 250 FOR IP): Performed by: STUDENT IN AN ORGANIZED HEALTH CARE EDUCATION/TRAINING PROGRAM

## 2024-03-06 PROCEDURE — G8484 FLU IMMUNIZE NO ADMIN: HCPCS | Performed by: SURGERY

## 2024-03-06 PROCEDURE — G8417 CALC BMI ABV UP PARAM F/U: HCPCS | Performed by: SURGERY

## 2024-03-06 RX ORDER — GRANULES FOR ORAL 3 G/1
3 POWDER ORAL ONCE
Status: COMPLETED | OUTPATIENT
Start: 2024-03-06 | End: 2024-03-06

## 2024-03-06 RX ORDER — HYDROCODONE BITARTRATE AND ACETAMINOPHEN 5; 325 MG/1; MG/1
1 TABLET ORAL EVERY 12 HOURS PRN
Qty: 6 TABLET | Refills: 0 | Status: SHIPPED | OUTPATIENT
Start: 2024-03-06 | End: 2024-03-09

## 2024-03-06 RX ORDER — CLONIDINE HYDROCHLORIDE 0.3 MG/1
0.3 TABLET ORAL 2 TIMES DAILY
COMMUNITY

## 2024-03-06 RX ORDER — DIPHENHYDRAMINE HYDROCHLORIDE 50 MG/ML
12.5 INJECTION INTRAMUSCULAR; INTRAVENOUS ONCE
Status: COMPLETED | OUTPATIENT
Start: 2024-03-06 | End: 2024-03-06

## 2024-03-06 RX ORDER — HYDROCODONE BITARTRATE AND ACETAMINOPHEN 5; 325 MG/1; MG/1
1 TABLET ORAL ONCE
Status: COMPLETED | OUTPATIENT
Start: 2024-03-06 | End: 2024-03-06

## 2024-03-06 RX ORDER — CARVEDILOL 25 MG/1
25 TABLET ORAL 2 TIMES DAILY WITH MEALS
COMMUNITY

## 2024-03-06 RX ORDER — BUMETANIDE 0.25 MG/ML
1 INJECTION INTRAMUSCULAR; INTRAVENOUS ONCE
Status: COMPLETED | OUTPATIENT
Start: 2024-03-06 | End: 2024-03-06

## 2024-03-06 RX ADMIN — BUMETANIDE 1 MG: 0.25 INJECTION INTRAMUSCULAR; INTRAVENOUS at 20:36

## 2024-03-06 RX ADMIN — HYDROCODONE BITARTRATE AND ACETAMINOPHEN 1 TABLET: 5; 325 TABLET ORAL at 21:14

## 2024-03-06 RX ADMIN — GRANULES FOR ORAL SOLUTION 1 PACKET: 3 POWDER ORAL at 21:14

## 2024-03-06 RX ADMIN — DIPHENHYDRAMINE HYDROCHLORIDE 12.5 MG: 50 INJECTION INTRAMUSCULAR; INTRAVENOUS at 21:15

## 2024-03-06 ASSESSMENT — PAIN - FUNCTIONAL ASSESSMENT
PAIN_FUNCTIONAL_ASSESSMENT: 0-10
PAIN_FUNCTIONAL_ASSESSMENT: PREVENTS OR INTERFERES SOME ACTIVE ACTIVITIES AND ADLS

## 2024-03-06 ASSESSMENT — PAIN SCALES - GENERAL
PAINLEVEL_OUTOF10: 10
PAINLEVEL_OUTOF10: 10

## 2024-03-06 ASSESSMENT — PAIN DESCRIPTION - PAIN TYPE: TYPE: ACUTE PAIN

## 2024-03-06 ASSESSMENT — PAIN DESCRIPTION - LOCATION
LOCATION: ABDOMEN
LOCATION: LEG;FACE

## 2024-03-06 ASSESSMENT — PAIN DESCRIPTION - ORIENTATION
ORIENTATION: RIGHT
ORIENTATION: RIGHT

## 2024-03-06 ASSESSMENT — PAIN DESCRIPTION - DESCRIPTORS
DESCRIPTORS: ACHING
DESCRIPTORS: STABBING;DISCOMFORT

## 2024-03-06 ASSESSMENT — PAIN DESCRIPTION - ONSET: ONSET: ON-GOING

## 2024-03-06 ASSESSMENT — PAIN DESCRIPTION - FREQUENCY: FREQUENCY: CONTINUOUS
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
## 2024-03-06 NOTE — TELEPHONE ENCOUNTER
Left message regarding lower extremity arterial doppler study at [facility] on 3-21-24 at 11:00 am.  [place] at 10:30 am.

## 2024-03-07 LAB
EKG ATRIAL RATE: 63 BPM
EKG P AXIS: 65 DEGREES
EKG P-R INTERVAL: 222 MS
EKG Q-T INTERVAL: 484 MS
EKG QRS DURATION: 118 MS
EKG QTC CALCULATION (BAZETT): 495 MS
EKG R AXIS: 60 DEGREES
EKG T AXIS: 81 DEGREES
EKG VENTRICULAR RATE: 63 BPM

## 2024-03-07 PROCEDURE — 93010 ELECTROCARDIOGRAM REPORT: CPT | Performed by: INTERNAL MEDICINE

## 2024-03-07 ASSESSMENT — ENCOUNTER SYMPTOMS
FACIAL SWELLING: 1
ABDOMINAL PAIN: 0
DIARRHEA: 0
NAUSEA: 0
PHOTOPHOBIA: 0
SHORTNESS OF BREATH: 0
VOMITING: 0
CHEST TIGHTNESS: 0
ABDOMINAL DISTENTION: 0
COUGH: 0

## 2024-03-07 NOTE — ED PROVIDER NOTES
10/4/2023).    Social History:  reports that she has been smoking cigarettes. She started smoking about 49 years ago. She has a 24.8 pack-year smoking history. She has never used smokeless tobacco. She reports that she does not currently use alcohol. She reports current drug use. Drug: Marijuana (Weed).    Family History: family history includes Asthma in her father; Breast Cancer (age of onset: 74) in her maternal aunt; Cancer in her mother; Colon Cancer in her brother and mother.     The patient’s home medications have been reviewed.    Allergies: Latex, Bee venom, Dilaudid [hydromorphone hcl], Dye [iodides], Keflex [cephalexin], Bee pollen, Cetirizine & related, Fentanyl, Lasix [furosemide], Levaquin [levofloxacin in d5w], Lipitor, Lyrica [pregabalin], Morphine, Naproxen, Nefazodone, Norvasc [amlodipine], Oxycodone-acetaminophen, Shellfish-derived products, and Trazodone and nefazodone    -------------------------------------------------- RESULTS -------------------------------------------------  Labs:  Results for orders placed or performed during the hospital encounter of 03/06/24   CBC with Auto Differential   Result Value Ref Range    WBC 6.4 4.5 - 11.5 k/uL    RBC 3.32 (L) 3.50 - 5.50 m/uL    Hemoglobin 10.6 (L) 11.5 - 15.5 g/dL    Hematocrit 32.2 (L) 34.0 - 48.0 %    MCV 97.0 80.0 - 99.9 fL    MCH 31.9 26.0 - 35.0 pg    MCHC 32.9 32.0 - 34.5 g/dL    RDW 12.5 11.5 - 15.0 %    Platelets 270 130 - 450 k/uL    MPV 10.9 7.0 - 12.0 fL    Neutrophils % 74 43.0 - 80.0 %    Lymphocytes % 18 (L) 20.0 - 42.0 %    Monocytes % 5 2.0 - 12.0 %    Eosinophils % 2 0 - 6 %    Basophils % 0 0.0 - 2.0 %    Immature Granulocytes 0 0.0 - 5.0 %    Neutrophils Absolute 4.74 1.80 - 7.30 k/uL    Lymphocytes Absolute 1.15 (L) 1.50 - 4.00 k/uL    Monocytes Absolute 0.29 0.10 - 0.95 k/uL    Eosinophils Absolute 0.15 0.05 - 0.50 k/uL    Basophils Absolute 0.02 0.00 - 0.20 k/uL    Absolute Immature Granulocyte <0.03 0.00 - 0.58 k/uL  cardiomegaly.             ------------------------- NURSING NOTES AND VITALS REVIEWED ---------------------------  Date / Time Roomed:  3/6/2024  5:30 PM  ED Bed Assignment:  SANTOS/SANTOS    The nursing notes within the ED encounter and vital signs as below have been reviewed.   BP (!) 141/66   Pulse 69   Temp 98.1 °F (36.7 °C) (Oral)   Resp 18   Wt 88.9 kg (196 lb)   LMP 01/01/1990   SpO2 97%   BMI 35.84 kg/m²   Oxygen Saturation Interpretation: Normal      ------------------------------------------ PROGRESS NOTES ------------------------------------------  2:03 AM EST  I have spoken with the patient and discussed today’s results, in addition to providing specific details for the plan of care and counseling regarding the diagnosis and prognosis.  Their questions are answered at this time and they are agreeable with the plan. I discussed at length with them reasons for immediate return here for re evaluation. They will followup with their primary care physician by calling their office tomorrow.      --------------------------------- ADDITIONAL PROVIDER NOTES ---------------------------------  At this time the patient is without objective evidence of an acute process requiring hospitalization or inpatient management.  They have remained hemodynamically stable throughout their entire ED visit and are stable for discharge with outpatient follow-up.     The plan has been discussed in detail and they are aware of the specific conditions for emergent return, as well as the importance of follow-up.      Discharge Medication List as of 3/6/2024  9:23 PM        START taking these medications    Details   HYDROcodone-acetaminophen (NORCO) 5-325 MG per tablet Take 1 tablet by mouth every 12 hours as needed for Pain for up to 3 days. Intended supply: 3 days. Take lowest dose possible to manage pain Max Daily Amount: 2 tablets, Disp-6 tablet, R-0Normal             Diagnosis:  1. Pain of lower extremity, unspecified laterality

## 2024-03-11 ENCOUNTER — HOSPITAL ENCOUNTER (OUTPATIENT)
Dept: OTHER | Age: 69
Setting detail: THERAPIES SERIES
Discharge: HOME | End: 2024-03-11
Payer: MEDICARE

## 2024-03-11 VITALS
HEART RATE: 76 BPM | SYSTOLIC BLOOD PRESSURE: 110 MMHG | RESPIRATION RATE: 16 BRPM | OXYGEN SATURATION: 94 % | WEIGHT: 182 LBS | DIASTOLIC BLOOD PRESSURE: 58 MMHG | BODY MASS INDEX: 33.28 KG/M2

## 2024-03-11 LAB
ANION GAP SERPL CALCULATED.3IONS-SCNC: 11 MMOL/L (ref 7–16)
BNP SERPL-MCNC: 2196 PG/ML (ref 0–125)
BUN SERPL-MCNC: 28 MG/DL (ref 6–23)
CALCIUM SERPL-MCNC: 9.5 MG/DL (ref 8.6–10.2)
CHLORIDE SERPL-SCNC: 97 MMOL/L (ref 98–107)
CO2 SERPL-SCNC: 27 MMOL/L (ref 22–29)
CREAT SERPL-MCNC: 1.7 MG/DL (ref 0.5–1)
GFR SERPL CREATININE-BSD FRML MDRD: 32 ML/MIN/1.73M2
GLUCOSE SERPL-MCNC: 170 MG/DL (ref 74–99)
POTASSIUM SERPL-SCNC: 4.5 MMOL/L (ref 3.5–5)
SODIUM SERPL-SCNC: 135 MMOL/L (ref 132–146)

## 2024-03-11 PROCEDURE — 36415 COLL VENOUS BLD VENIPUNCTURE: CPT

## 2024-03-11 PROCEDURE — 80048 BASIC METABOLIC PNL TOTAL CA: CPT

## 2024-03-11 PROCEDURE — 83880 ASSAY OF NATRIURETIC PEPTIDE: CPT

## 2024-03-11 PROCEDURE — 99214 OFFICE O/P EST MOD 30 MIN: CPT

## 2024-03-11 RX ORDER — NITROGLYCERIN 0.3 MG/1
0.3 TABLET SUBLINGUAL EVERY 5 MIN PRN
COMMUNITY

## 2024-03-11 RX ORDER — BENZONATATE 100 MG/1
100 CAPSULE ORAL 3 TIMES DAILY PRN
COMMUNITY

## 2024-03-11 NOTE — PROGRESS NOTES
2:34 PM 1/9/2019     3:08 PM 5/9/2018    10:59 AM   PHQ Scores   PHQ2 Score 2 4 1   PHQ9 Score 2 13 1     Interpretation of Total Score Depression Severity:   1-4 = Minimal depression  5-9 = Mild depression  10-14 = Moderate depression  15-19 = Moderately severe depression  20-27 = Severe depression   [] Patient provided community resources for counseling services  [] PCP called and PHQ result faxed   [] [facility]        Scheduled to follow up in CHF clinic on:   Future Appointments   Date Time Provider Department Center   3/15/2024 10:00 AM Jasmine Ramirez, PATRIZIA - CNP [facility]   3/21/2024 11:00 AM SE VASCULAR LAB RM 2 [facility]   4/9/2024  1:30 PM Jose Diaz DO [facility]   4/11/2024 12:00 PM SEB CHF ROOM 3 [facility]   5/16/2024  1:00 PM Gabino Boateng MD [facility]   7/18/2024 10:30 AM Pino Hernandez MD VASC/MED [facility]   8/14/2024 10:00 AM Jose Diaz DO [facility]

## 2024-03-15 ENCOUNTER — HOSPITAL ENCOUNTER (OUTPATIENT)
Dept: WOUND CARE | Age: 69
Discharge: HOME | End: 2024-03-15

## 2024-03-22 ENCOUNTER — HOSPITAL ENCOUNTER (OUTPATIENT)
Dept: OTHER | Age: 69
Discharge: HOME | End: 2024-03-22

## 2024-04-01 ENCOUNTER — HOSPITAL ENCOUNTER (OUTPATIENT)
Dept: WOUND CARE | Age: 69
Discharge: HOME | End: 2024-04-01
Payer: MEDICARE

## 2024-04-01 VITALS — RESPIRATION RATE: 18 BRPM | DIASTOLIC BLOOD PRESSURE: 67 MMHG | HEART RATE: 84 BPM | SYSTOLIC BLOOD PRESSURE: 134 MMHG

## 2024-04-01 DIAGNOSIS — L97.212 LOWER LIMB ULCER, CALF, RIGHT, WITH FAT LAYER EXPOSED (HCC): Primary | ICD-10-CM

## 2024-04-01 DIAGNOSIS — L97.211 LOWER LIMB ULCER, CALF, RIGHT, LIMITED TO BREAKDOWN OF SKIN (HCC): ICD-10-CM

## 2024-04-01 DIAGNOSIS — Z79.4 TYPE 2 DIABETES MELLITUS WITH DIABETIC PERIPHERAL ANGIOPATHY WITHOUT GANGRENE, WITH LONG-TERM CURRENT USE OF INSULIN (HCC): Chronic | ICD-10-CM

## 2024-04-01 DIAGNOSIS — F17.200 TOBACCO DEPENDENCE: ICD-10-CM

## 2024-04-01 DIAGNOSIS — I73.9 PVD (PERIPHERAL VASCULAR DISEASE) WITH CLAUDICATION (HCC): ICD-10-CM

## 2024-04-01 DIAGNOSIS — E11.51 TYPE 2 DIABETES MELLITUS WITH DIABETIC PERIPHERAL ANGIOPATHY WITHOUT GANGRENE, WITH LONG-TERM CURRENT USE OF INSULIN (HCC): Chronic | ICD-10-CM

## 2024-04-01 DIAGNOSIS — Z98.62 STATUS POST PERIPHERAL ARTERY ANGIOPLASTY: ICD-10-CM

## 2024-04-01 PROCEDURE — 11042 DBRDMT SUBQ TIS 1ST 20SQCM/<: CPT | Performed by: SURGERY

## 2024-04-01 PROCEDURE — 99213 OFFICE O/P EST LOW 20 MIN: CPT

## 2024-04-01 PROCEDURE — 11042 DBRDMT SUBQ TIS 1ST 20SQCM/<: CPT

## 2024-04-01 PROCEDURE — 99214 OFFICE O/P EST MOD 30 MIN: CPT | Performed by: SURGERY

## 2024-04-01 RX ORDER — LIDOCAINE 50 MG/G
OINTMENT TOPICAL ONCE
OUTPATIENT
Start: 2024-04-01 | End: 2024-04-01

## 2024-04-01 RX ORDER — LIDOCAINE HYDROCHLORIDE 20 MG/ML
JELLY TOPICAL ONCE
OUTPATIENT
Start: 2024-04-01 | End: 2024-04-01

## 2024-04-01 RX ORDER — IBUPROFEN 200 MG
TABLET ORAL ONCE
OUTPATIENT
Start: 2024-04-01 | End: 2024-04-01

## 2024-04-01 RX ORDER — BACITRACIN ZINC 500 [USP'U]/G
OINTMENT TOPICAL ONCE
OUTPATIENT
Start: 2024-04-01 | End: 2024-04-01

## 2024-04-01 RX ORDER — GENTAMICIN SULFATE 1 MG/G
OINTMENT TOPICAL ONCE
OUTPATIENT
Start: 2024-04-01 | End: 2024-04-01

## 2024-04-01 RX ORDER — SODIUM CHLOR/HYPOCHLOROUS ACID 0.033 %
SOLUTION, IRRIGATION IRRIGATION ONCE
OUTPATIENT
Start: 2024-04-01 | End: 2024-04-01

## 2024-04-01 RX ORDER — BACITRACIN ZINC AND POLYMYXIN B SULFATE 500; 1000 [USP'U]/G; [USP'U]/G
OINTMENT TOPICAL ONCE
OUTPATIENT
Start: 2024-04-01 | End: 2024-04-01

## 2024-04-01 RX ORDER — TRIAMCINOLONE ACETONIDE 1 MG/G
OINTMENT TOPICAL ONCE
OUTPATIENT
Start: 2024-04-01 | End: 2024-04-01

## 2024-04-01 RX ORDER — CLOBETASOL PROPIONATE 0.5 MG/G
OINTMENT TOPICAL ONCE
OUTPATIENT
Start: 2024-04-01 | End: 2024-04-01

## 2024-04-01 RX ORDER — BETAMETHASONE DIPROPIONATE 0.5 MG/G
CREAM TOPICAL ONCE
OUTPATIENT
Start: 2024-04-01 | End: 2024-04-01

## 2024-04-01 RX ORDER — LIDOCAINE HYDROCHLORIDE 40 MG/ML
SOLUTION TOPICAL ONCE
OUTPATIENT
Start: 2024-04-01 | End: 2024-04-01

## 2024-04-01 RX ORDER — LIDOCAINE 40 MG/G
CREAM TOPICAL ONCE
OUTPATIENT
Start: 2024-04-01 | End: 2024-04-01

## 2024-04-01 NOTE — PLAN OF CARE
Problem: Chronic Conditions and Co-morbidities  Goal: Patient's chronic conditions and co-morbidity symptoms are monitored and maintained or improved  Outcome: Progressing     Problem: Wound:  Goal: Will show signs of wound healing; wound closure and no evidence of infection  Description: Will show signs of wound healing; wound closure and no evidence of infection  Outcome: Progressing     Problem: Venous:  Goal: Signs of wound healing will improve  Description: Signs of wound healing will improve  Outcome: Progressing

## 2024-04-01 NOTE — DISCHARGE INSTRUCTIONS
Visit Discharge/Physician Orders    Discharge condition: Stable    Assessment of pain at discharge:    Anesthetic used:     Discharge to: Home    Left via:Private automobile    Accompanied by: accompanied by none    ECF/HHA: [facility]- note new orders     Dressing Orders:Cleanse wound to right leg with normal saline, apply ALGINATE AG to wound bed and cover with ABD pad and roll gauze- adhere with tape, change daily.  Spandgrip on Right leg- On in morning and off at night      Treatment Orders:Vasculars to be done 4/4   Culture taken  EAT DIET WITH PROTEINS AND VITAMIN C  TAKE A MULTIVITAMIN DAILY IF NOT CONTRAINDICATED      [facility] followup visit ______1 week with dr calero and then kojo in 2 weeks per patient request  _______________________  (Please note your next appointment above and if you are unable to keep, kindly give a 24 hour notice. Thank you.)    Physician signature:__________________________      If you experience any of the following, please call the Wound Care Center during business hours:    * Increase in Pain  * Temperature over 101  * Increase in drainage from your wound  * Drainage with a foul odor  * Bleeding  * Increase in swelling  * Need for compression bandage changes due to slippage, breakthrough drainage.    If you need medical attention outside of the business hours of the Wound Care Centers please contact your PCP or go to the nearest emergency room.

## 2024-04-01 NOTE — PROGRESS NOTES
Wound Healing Center /Hyperbarics   History and Physical/Consultation  Vascular    Referring Physician : Lita Swain APRN - NP  tSeph Gonzáles  MEDICAL RECORD NUMBER:  [id number]  AGE: 68 y.o.   GENDER: female  : 1955  EPISODE DATE:  2024  Subjective:     Chief Complaint   Patient presents with    Wound Check     Right leg         HISTORY of PRESENT ILLNESS HPI     Steph Gonzáles is a 68 y.o. female who presents today for wound/ulcer evaluation.   History of Wound Context:  The patient has had a wound of right calf just above the ankle which was first noted approximately 2024, tells me, the leg was injured, while moving on the gurney while she was hospitalized at [facility].  This has been treated by the patient.  On their initial visit to the wound healing center, 24, the patient has noted that the wound has not been improving.  The patient has not had similar previous wounds in the past.        Patient, when I saw her in the office last week, did not want to come to the wound care center the [facility], wanted to go to the [facility] due to some confusion, patient ended up coming to the wound care center at the [facility]    Patient denies any history of fever or chills, any chest pain or shortness of breath    Pt is currently not on abx.      Wound/Ulcer Pain Timing/Severity: constant  Quality of pain: dull, aching  Severity:  4 / 10   Modifying Factors: Pain worsens with walking  Associated Signs/Symptoms: drainage and pain    Ulcer Identification:  Ulcer Type: arterial, diabetic, non-healing/non-surgical, and traumatic  Contributing Factors: diabetes, decreased mobility, smoking, and arterial insufficiency    Diabetic/Pressure/Non Pressure Ulcers only:  Ulcer: Diabetic ulcer, fat layer exposed    If patient has diabetic lower extremity wounds  Harrison Classification of diabetic lower extremity wounds:    Grade Description   []  0 No open wound

## 2024-04-03 NOTE — DISCHARGE INSTRUCTIONS
Visit Discharge/Physician Orders     Discharge condition: Stable     Assessment of pain at discharge: yes     Anesthetic used:  lidocaine 4%      Discharge to: Home     Left via:Private automobile     Accompanied by: accompanied by none     ECF/HHA: [facility]     Dressing Orders:Cleanse wound to right leg with normal saline, apply ALGINATE AG to wound bed and cover with ABD pad and roll gauze- adhere with tape, change daily.  Spandgrip on Right leg- On in morning and off at night       Treatment Orders:Vasculars to be done 4/4- completed   EAT DIET WITH PROTEINS AND VITAMIN C  TAKE A MULTIVITAMIN DAILY IF NOT CONTRAINDICATED       [facility] followup visit _____1 week  _______________________  (Please note your next appointment above and if you are unable to keep, kindly give a 24 hour notice. Thank you.)     Physician signature:__________________________        If you experience any of the following, please call the Wound Care Center during business hours:     * Increase in Pain  * Temperature over 101  * Increase in drainage from your wound  * Drainage with a foul odor  * Bleeding  * Increase in swelling  * Need for compression bandage changes due to slippage, breakthrough drainage.     If you need medical attention outside of the business hours of the Wound Care Centers please contact your PCP or go to the nearest emergency room.

## 2024-04-04 ENCOUNTER — HOSPITAL ENCOUNTER (OUTPATIENT)
Dept: INTERVENTIONAL RADIOLOGY/VASCULAR | Age: 69
Discharge: HOME | End: 2024-04-06
Attending: SURGERY
Payer: MEDICARE

## 2024-04-04 DIAGNOSIS — F17.200 TOBACCO DEPENDENCE: ICD-10-CM

## 2024-04-04 DIAGNOSIS — I70.232 ATHEROSCLEROSIS OF NATIVE ARTERY OF RIGHT LOWER EXTREMITY WITH ULCERATION OF CALF (HCC): ICD-10-CM

## 2024-04-04 DIAGNOSIS — I73.9 PVD (PERIPHERAL VASCULAR DISEASE) WITH CLAUDICATION (HCC): ICD-10-CM

## 2024-04-04 DIAGNOSIS — L97.211 LOWER LIMB ULCER, CALF, RIGHT, LIMITED TO BREAKDOWN OF SKIN (HCC): ICD-10-CM

## 2024-04-04 PROCEDURE — 93923 UPR/LXTR ART STDY 3+ LVLS: CPT | Performed by: SURGERY

## 2024-04-04 PROCEDURE — 93923 UPR/LXTR ART STDY 3+ LVLS: CPT

## 2024-04-05 ENCOUNTER — CLINICAL DOCUMENTATION (OUTPATIENT)
Dept: VASCULAR SURGERY | Age: 69
End: 2024-04-05

## 2024-04-05 NOTE — PROGRESS NOTES
The lower extremity arterial Doppler study was personally reviewed by me as outlined below          On the right side, Doppler is mild iliac and mainly femoral-popliteal arterial  occlusive disease with an ankle-brachial index of 0.81, with adequate flow to  the foot based upon the pulse volume recording with metatarsal     On the left side, mild right iliac and mainly femoral-popliteal arterial  occlusive disease, with ankle arm index 0.74 with adequate flow to the foot  based upon the pulse volume recording over the metatarsal     Bilaterally significantly diminished pulse volume recordings noted over the toes  almost flat right more than the left       At the level of the ulcer, of the right distal calf, based upon the pulse volume recordings of the calf and and the ankle appears to have reportedly adequate collateral flow for tissue healing    Will discuss with patient results when she comes to see me at the wound care center next week

## 2024-04-08 ENCOUNTER — HOSPITAL ENCOUNTER (OUTPATIENT)
Dept: WOUND CARE | Age: 69
Discharge: HOME | End: 2024-04-08
Payer: MEDICARE

## 2024-04-08 ENCOUNTER — TELEPHONE (OUTPATIENT)
Dept: ENT CLINIC | Age: 69
End: 2024-04-08

## 2024-04-08 VITALS
RESPIRATION RATE: 20 BRPM | BODY MASS INDEX: 33.49 KG/M2 | DIASTOLIC BLOOD PRESSURE: 76 MMHG | TEMPERATURE: 98.2 F | HEART RATE: 76 BPM | SYSTOLIC BLOOD PRESSURE: 138 MMHG | WEIGHT: 182 LBS | HEIGHT: 62 IN

## 2024-04-08 DIAGNOSIS — F17.200 TOBACCO DEPENDENCE: ICD-10-CM

## 2024-04-08 DIAGNOSIS — I70.232 ATHEROSCLEROSIS OF NATIVE ARTERY OF RIGHT LOWER EXTREMITY WITH ULCERATION OF CALF (HCC): Primary | ICD-10-CM

## 2024-04-08 DIAGNOSIS — L97.211 LOWER LIMB ULCER, CALF, RIGHT, LIMITED TO BREAKDOWN OF SKIN (HCC): ICD-10-CM

## 2024-04-08 DIAGNOSIS — L97.212 LOWER LIMB ULCER, CALF, RIGHT, WITH FAT LAYER EXPOSED (HCC): ICD-10-CM

## 2024-04-08 PROCEDURE — 99212 OFFICE O/P EST SF 10 MIN: CPT | Performed by: SURGERY

## 2024-04-08 PROCEDURE — 11042 DBRDMT SUBQ TIS 1ST 20SQCM/<: CPT

## 2024-04-08 PROCEDURE — 11042 DBRDMT SUBQ TIS 1ST 20SQCM/<: CPT | Performed by: SURGERY

## 2024-04-08 RX ORDER — LIDOCAINE HYDROCHLORIDE 40 MG/ML
SOLUTION TOPICAL ONCE
Status: COMPLETED | OUTPATIENT
Start: 2024-04-08 | End: 2024-04-08

## 2024-04-08 RX ORDER — LIDOCAINE HYDROCHLORIDE 20 MG/ML
JELLY TOPICAL ONCE
OUTPATIENT
Start: 2024-04-08 | End: 2024-04-08

## 2024-04-08 RX ORDER — BETAMETHASONE DIPROPIONATE 0.5 MG/G
CREAM TOPICAL ONCE
OUTPATIENT
Start: 2024-04-08 | End: 2024-04-08

## 2024-04-08 RX ORDER — LIDOCAINE 50 MG/G
OINTMENT TOPICAL ONCE
OUTPATIENT
Start: 2024-04-08 | End: 2024-04-08

## 2024-04-08 RX ORDER — BACITRACIN ZINC AND POLYMYXIN B SULFATE 500; 1000 [USP'U]/G; [USP'U]/G
OINTMENT TOPICAL ONCE
OUTPATIENT
Start: 2024-04-08 | End: 2024-04-08

## 2024-04-08 RX ORDER — SODIUM CHLOR/HYPOCHLOROUS ACID 0.033 %
SOLUTION, IRRIGATION IRRIGATION ONCE
OUTPATIENT
Start: 2024-04-08 | End: 2024-04-08

## 2024-04-08 RX ORDER — BACITRACIN ZINC 500 [USP'U]/G
OINTMENT TOPICAL ONCE
OUTPATIENT
Start: 2024-04-08 | End: 2024-04-08

## 2024-04-08 RX ORDER — GENTAMICIN SULFATE 1 MG/G
OINTMENT TOPICAL ONCE
OUTPATIENT
Start: 2024-04-08 | End: 2024-04-08

## 2024-04-08 RX ORDER — LIDOCAINE 40 MG/G
CREAM TOPICAL ONCE
OUTPATIENT
Start: 2024-04-08 | End: 2024-04-08

## 2024-04-08 RX ORDER — CLOBETASOL PROPIONATE 0.5 MG/G
OINTMENT TOPICAL ONCE
OUTPATIENT
Start: 2024-04-08 | End: 2024-04-08

## 2024-04-08 RX ORDER — LIDOCAINE HYDROCHLORIDE 40 MG/ML
SOLUTION TOPICAL ONCE
OUTPATIENT
Start: 2024-04-08 | End: 2024-04-08

## 2024-04-08 RX ORDER — TRIAMCINOLONE ACETONIDE 1 MG/G
OINTMENT TOPICAL ONCE
OUTPATIENT
Start: 2024-04-08 | End: 2024-04-08

## 2024-04-08 RX ORDER — IBUPROFEN 200 MG
TABLET ORAL ONCE
OUTPATIENT
Start: 2024-04-08 | End: 2024-04-08

## 2024-04-08 RX ADMIN — LIDOCAINE HYDROCHLORIDE 10 ML: 40 SOLUTION TOPICAL at 10:13

## 2024-04-08 ASSESSMENT — PAIN SCALES - GENERAL: PAINLEVEL_OUTOF10: 9

## 2024-04-08 ASSESSMENT — PAIN DESCRIPTION - DESCRIPTORS: DESCRIPTORS: BURNING;SHARP;THROBBING

## 2024-04-08 ASSESSMENT — PAIN DESCRIPTION - FREQUENCY: FREQUENCY: CONTINUOUS

## 2024-04-08 ASSESSMENT — PAIN DESCRIPTION - PAIN TYPE: TYPE: CHRONIC PAIN

## 2024-04-08 ASSESSMENT — PAIN - FUNCTIONAL ASSESSMENT: PAIN_FUNCTIONAL_ASSESSMENT: PREVENTS OR INTERFERES SOME ACTIVE ACTIVITIES AND ADLS

## 2024-04-08 ASSESSMENT — PAIN DESCRIPTION - LOCATION: LOCATION: LEG

## 2024-04-08 ASSESSMENT — PAIN DESCRIPTION - ORIENTATION: ORIENTATION: RIGHT

## 2024-04-08 ASSESSMENT — PAIN DESCRIPTION - ONSET: ONSET: ON-GOING

## 2024-04-08 NOTE — PLAN OF CARE
Problem: Wound:  Goal: Will show signs of wound healing; wound closure and no evidence of infection  Description: Will show signs of wound healing; wound closure and no evidence of infection  Outcome: Progressing     Problem: Venous:  Goal: Signs of wound healing will improve  Description: Signs of wound healing will improve  Outcome: Progressing

## 2024-04-08 NOTE — TELEPHONE ENCOUNTER
Pt states she has serviceman coming to her home tomorrow for emergency stove repair and needs to r/s her 04-09-24 appt with Dr Diaz.  I was not able to accommodate her needs.  Please call her back at [phone]

## 2024-04-08 NOTE — PROGRESS NOTES
Norvasc [Amlodipine] Swelling    Oxycodone-Acetaminophen Swelling    Shellfish-Derived Products     Trazodone And Nefazodone      Current Outpatient Medications on File Prior to Encounter   Medication Sig Dispense Refill    nitroGLYCERIN (NITROSTAT) 0.3 MG SL tablet Place 1 tablet under the tongue every 5 minutes as needed for Chest pain up to max of 3 total doses. If no relief after 1 dose, call 911.      benzonatate (TESSALON) 100 MG capsule Take 1 capsule by mouth 3 times daily as needed for Cough      carvedilol (COREG) 25 MG tablet Take 1 tablet by mouth 2 times daily (with meals)      cloNIDine (CATAPRES) 0.3 MG tablet Take 1 tablet by mouth 2 times daily      neomycin-polymyxin-hydrocortisone (CORTISPORIN) SUSP ophthalmic suspension Place into both eyes (Patient not taking: Reported on 3/11/2024)      oxyBUTYnin (DITROPAN XL) 15 MG extended release tablet Take 1 tablet by mouth daily      insulin lispro (HUMALOG) 100 UNIT/ML SOLN injection vial Inject into the skin 3 times daily (with meals) Sliding scale      isosorbide mononitrate (IMDUR) 60 MG extended release tablet Take 1 tablet by mouth daily 90 tablet 3    minoxidil (LONITEN) 2.5 MG tablet Take 4 tablets by mouth daily      hydrALAZINE (APRESOLINE) 100 MG tablet Take 1 tablet by mouth every 8 hours 90 tablet 0    LINZESS 290 MCG CAPS capsule Take 1 capsule by mouth every morning (before breakfast)      fluticasone (FLONASE) 50 MCG/ACT nasal spray 2 sprays by Nasal route daily 2 sprays each nostril once daily 1 each 3    Azelastine HCl 137 MCG/SPRAY SOLN USE 2 SPRAYS IN EACH NOSTRIL IN THE MORNING AND AT BEDTIME AS DIRECTED 1 each 4    omeprazole (PRILOSEC OTC) 20 MG tablet Take 2 tablets by mouth daily 30 tablet 3    sacubitril-valsartan (ENTRESTO)  MG per tablet Take 1 tablet by mouth 2 times daily 180 tablet 3    simvastatin (ZOCOR) 20 MG tablet Take 1 tablet by mouth nightly 90 tablet 3    budesonide-formoterol (SYMBICORT) 160-4.5 MCG/ACT AERO

## 2024-04-09 NOTE — DISCHARGE INSTRUCTIONS
Visit Discharge/Physician Orders     Discharge condition: Stable     Assessment of pain at discharge: yes     Anesthetic used:  lidocaine 4%      Discharge to: Home     Left via:Private automobile     Accompanied by: accompanied by none     [facility]     Dressing Orders:Cleanse wound to right leg with normal saline, apply ALGINATE AG to wound bed and cover with bordered gauze change daily. (*May use EXCEL SAP to cover at home- please order)  Spandgrip on Right leg- On in morning and off at night       Treatment Orders:Vasculars to be done 4/4- reviewed    EAT DIET WITH PROTEINS AND VITAMIN C  TAKE A MULTIVITAMIN DAILY IF NOT CONTRAINDICATED    4/15 Miconazole Ointment prescription sent to pharmacy- apply to feet, toes, and legs- avoiding wounds twice a day for one month -  at prescription      [facility] followup visit _____1 week  _______________________  (Please note your next appointment above and if you are unable to keep, kindly give a 24 hour notice. Thank you.)     Physician signature:__________________________        If you experience any of the following, please call the Wound Care Center during business hours:     * Increase in Pain  * Temperature over 101  * Increase in drainage from your wound  * Drainage with a foul odor  * Bleeding  * Increase in swelling  * Need for compression bandage changes due to slippage, breakthrough drainage.     If you need medical attention outside of the business hours of the Wound Care Centers please contact your PCP or go to the nearest emergency room.

## 2024-04-11 ENCOUNTER — TELEPHONE (OUTPATIENT)
Dept: CARDIOLOGY CLINIC | Age: 69
End: 2024-04-11

## 2024-04-11 ENCOUNTER — HOSPITAL ENCOUNTER (OUTPATIENT)
Dept: OTHER | Age: 69
Discharge: HOME | End: 2024-04-11

## 2024-04-11 RX ORDER — CARVEDILOL 12.5 MG/1
12.5 TABLET ORAL 2 TIMES DAILY WITH MEALS
COMMUNITY

## 2024-04-11 RX ORDER — HYDRALAZINE HYDROCHLORIDE 50 MG/1
50 TABLET, FILM COATED ORAL 3 TIMES DAILY
Qty: 90 TABLET | Refills: 11 | Status: SHIPPED | OUTPATIENT
Start: 2024-04-11

## 2024-04-11 RX ORDER — HYDRALAZINE HYDROCHLORIDE 50 MG/1
50 TABLET, FILM COATED ORAL 3 TIMES DAILY
COMMUNITY
End: 2024-04-11 | Stop reason: SDUPTHER

## 2024-04-11 NOTE — TELEPHONE ENCOUNTER
Patient notified of Dr. Boateng's recommendations. Med list amended.  Hydralazine e-scribed as she could not cut the tablets in half, they crumbled.

## 2024-04-11 NOTE — TELEPHONE ENCOUNTER
Patient called stating her BPs have been running low for the last several days. She states she feels fatigued and does have some intermittent dizziness. PCP decreased Coreg last week to 12.5 mg BID.     88/58 (91)  95/62 (80)  92/48 (84) today

## 2024-04-11 NOTE — TELEPHONE ENCOUNTER
Please ask her to decrease hydralazine to 50 mg p.o. 3 times a day, monitor blood pressure daily and call us in 1 week.  Continue current dose of Coreg 12.5 mg p.o. twice daily.  I would also recommend decreasing clonidine 0.3 mg  to half tablet p.o. 3 times a day

## 2024-04-15 ENCOUNTER — HOSPITAL ENCOUNTER (OUTPATIENT)
Dept: WOUND CARE | Age: 69
Discharge: HOME | End: 2024-04-15
Attending: SURGERY
Payer: MEDICARE

## 2024-04-15 ENCOUNTER — TELEPHONE (OUTPATIENT)
Dept: ENT CLINIC | Age: 69
End: 2024-04-15

## 2024-04-15 VITALS
SYSTOLIC BLOOD PRESSURE: 142 MMHG | DIASTOLIC BLOOD PRESSURE: 80 MMHG | HEART RATE: 82 BPM | RESPIRATION RATE: 20 BRPM | TEMPERATURE: 97.2 F

## 2024-04-15 DIAGNOSIS — B35.3 TINEA PEDIS OF BOTH FEET: ICD-10-CM

## 2024-04-15 DIAGNOSIS — F17.200 TOBACCO DEPENDENCE: ICD-10-CM

## 2024-04-15 DIAGNOSIS — L97.211 LOWER LIMB ULCER, CALF, RIGHT, LIMITED TO BREAKDOWN OF SKIN (HCC): ICD-10-CM

## 2024-04-15 DIAGNOSIS — I70.232 ATHEROSCLEROSIS OF NATIVE ARTERY OF RIGHT LOWER EXTREMITY WITH ULCERATION OF CALF (HCC): Primary | ICD-10-CM

## 2024-04-15 DIAGNOSIS — L97.212 LOWER LIMB ULCER, CALF, RIGHT, WITH FAT LAYER EXPOSED (HCC): ICD-10-CM

## 2024-04-15 PROCEDURE — 11042 DBRDMT SUBQ TIS 1ST 20SQCM/<: CPT

## 2024-04-15 PROCEDURE — 11042 DBRDMT SUBQ TIS 1ST 20SQCM/<: CPT | Performed by: SURGERY

## 2024-04-15 RX ORDER — LIDOCAINE HYDROCHLORIDE 40 MG/ML
SOLUTION TOPICAL ONCE
Status: COMPLETED | OUTPATIENT
Start: 2024-04-15 | End: 2024-04-15

## 2024-04-15 RX ORDER — LIDOCAINE 40 MG/G
CREAM TOPICAL ONCE
OUTPATIENT
Start: 2024-04-15 | End: 2024-04-15

## 2024-04-15 RX ORDER — GENTAMICIN SULFATE 1 MG/G
OINTMENT TOPICAL ONCE
OUTPATIENT
Start: 2024-04-15 | End: 2024-04-15

## 2024-04-15 RX ORDER — SODIUM CHLOR/HYPOCHLOROUS ACID 0.033 %
SOLUTION, IRRIGATION IRRIGATION ONCE
OUTPATIENT
Start: 2024-04-15 | End: 2024-04-15

## 2024-04-15 RX ORDER — LIDOCAINE HYDROCHLORIDE 20 MG/ML
JELLY TOPICAL ONCE
OUTPATIENT
Start: 2024-04-15 | End: 2024-04-15

## 2024-04-15 RX ORDER — CLOBETASOL PROPIONATE 0.5 MG/G
OINTMENT TOPICAL ONCE
OUTPATIENT
Start: 2024-04-15 | End: 2024-04-15

## 2024-04-15 RX ORDER — BACITRACIN ZINC AND POLYMYXIN B SULFATE 500; 1000 [USP'U]/G; [USP'U]/G
OINTMENT TOPICAL ONCE
OUTPATIENT
Start: 2024-04-15 | End: 2024-04-15

## 2024-04-15 RX ORDER — IBUPROFEN 200 MG
TABLET ORAL ONCE
OUTPATIENT
Start: 2024-04-15 | End: 2024-04-15

## 2024-04-15 RX ORDER — LIDOCAINE HYDROCHLORIDE 40 MG/ML
SOLUTION TOPICAL ONCE
OUTPATIENT
Start: 2024-04-15 | End: 2024-04-15

## 2024-04-15 RX ORDER — TRIAMCINOLONE ACETONIDE 1 MG/G
OINTMENT TOPICAL ONCE
OUTPATIENT
Start: 2024-04-15 | End: 2024-04-15

## 2024-04-15 RX ORDER — LIDOCAINE 50 MG/G
OINTMENT TOPICAL ONCE
OUTPATIENT
Start: 2024-04-15 | End: 2024-04-15

## 2024-04-15 RX ORDER — BACITRACIN ZINC 500 [USP'U]/G
OINTMENT TOPICAL ONCE
OUTPATIENT
Start: 2024-04-15 | End: 2024-04-15

## 2024-04-15 RX ORDER — BETAMETHASONE DIPROPIONATE 0.5 MG/G
CREAM TOPICAL ONCE
OUTPATIENT
Start: 2024-04-15 | End: 2024-04-15

## 2024-04-15 RX ADMIN — LIDOCAINE HYDROCHLORIDE 6 ML: 40 SOLUTION TOPICAL at 10:02

## 2024-04-15 ASSESSMENT — PAIN DESCRIPTION - DESCRIPTORS: DESCRIPTORS: DISCOMFORT

## 2024-04-15 ASSESSMENT — PAIN DESCRIPTION - PAIN TYPE: TYPE: CHRONIC PAIN

## 2024-04-15 ASSESSMENT — PAIN SCALES - GENERAL: PAINLEVEL_OUTOF10: 10

## 2024-04-15 ASSESSMENT — PAIN DESCRIPTION - ONSET: ONSET: ON-GOING

## 2024-04-15 ASSESSMENT — PAIN - FUNCTIONAL ASSESSMENT: PAIN_FUNCTIONAL_ASSESSMENT: PREVENTS OR INTERFERES SOME ACTIVE ACTIVITIES AND ADLS

## 2024-04-15 ASSESSMENT — PAIN DESCRIPTION - FREQUENCY: FREQUENCY: CONTINUOUS

## 2024-04-15 ASSESSMENT — PAIN DESCRIPTION - LOCATION: LOCATION: LEG

## 2024-04-15 ASSESSMENT — PAIN DESCRIPTION - ORIENTATION: ORIENTATION: RIGHT

## 2024-04-15 NOTE — PLAN OF CARE
Problem: Wound:  Goal: Will show signs of wound healing; wound closure and no evidence of infection  Description: Will show signs of wound healing; wound closure and no evidence of infection  Outcome: Progressing     Problem: Venous:  Goal: Signs of wound healing will improve  Description: Signs of wound healing will improve  Outcome: Progressing     Problem: Chronic Conditions and Co-morbidities  Goal: Patient's chronic conditions and co-morbidity symptoms are monitored and maintained or improved  4/15/2024 1036 by Ashley Thompson RN  Outcome: Adequate for Discharge  4/15/2024 1034 by Ashley Thompson RN  Outcome: Adequate for Discharge     Problem: Pain  Goal: Verbalizes/displays adequate comfort level or baseline comfort level  Outcome: Adequate for Discharge

## 2024-04-15 NOTE — TELEPHONE ENCOUNTER
Pt has some questions regarding hearing aids and would like a return call.   Electronically signed by Wilma Leyva on 4/15/2024 at 3:11 PM

## 2024-04-15 NOTE — PROGRESS NOTES
None 04/08/24 1010   Kely-wound Assessment Hyperpigmented 04/08/24 1010   Number of days: 7          Procedure Note  Indications:  Based on my examination of this patient's wound(s)/ulcer(s) today, debridement is required to promote healing and evaluate the wound base.    Performed by: Pino Hernandez MD    Consent obtained:  Yes    Time out taken:  Yes    Pain Control: Anesthetic  Anesthetic: 4% Lidocaine Liquid Topical     Debridement:Excisional Debridement    Using curette the wound(s)/ulcer(s) was/were sharply debrided down through and including the removal of epidermis, dermis, and subcutaneous tissue.        Devitalized Tissue Debrided:  fibrin, slough, and necrotic/eschar to stimulate bleeding to promote healing, post debridement good bleeding base and wound edges noted    Wound/Ulcer #: 1    Percent of Wound/Ulcer Debrided: 70%    Total Surface Area Debrided:  1.5 sq cm     Estimated Blood Loss:  None  Hemostasis Achieved:  by pressure    Procedural Pain:  4  / 10   Post Procedural Pain:  4 / 10     Response to treatment:  Well tolerated by patient.     Plan:   Treatment Note please see attached Discharge Instructions    Written patient dismissal instructions given to patient and signed by patient or POA.         Discharge Instructions         Visit Discharge/Physician Orders     Discharge condition: Stable     Assessment of pain at discharge: yes     Anesthetic used:  lidocaine 4%      Discharge to: Home     Left via:Private automobile     Accompanied by: accompanied by none     ECF/HHA: [facility]     Dressing Orders:Cleanse wound to right leg with normal saline, apply ALGINATE AG to wound bed and cover with ABD pad and roll gauze- adhere with tape, change daily.  Spandgrip on Right leg- On in morning and off at night       Treatment Orders:Vasculars to be done 4/4- completed   EAT DIET WITH PROTEINS AND VITAMIN C  TAKE A MULTIVITAMIN DAILY IF NOT CONTRAINDICATED       [facility] followup visit _____1

## 2024-04-17 ENCOUNTER — TELEPHONE (OUTPATIENT)
Dept: VASCULAR SURGERY | Age: 69
End: 2024-04-17

## 2024-04-17 RX ORDER — DOXYCYCLINE HYCLATE 100 MG/1
100 CAPSULE ORAL 2 TIMES DAILY
Qty: 20 CAPSULE | Refills: 0 | Status: SHIPPED | OUTPATIENT
Start: 2024-04-17 | End: 2024-04-27

## 2024-04-17 NOTE — TELEPHONE ENCOUNTER
Patient called, has green discharge from right leg wound that has a foul odor, she is very concerned, please advise, thank you.  [phone]

## 2024-04-17 NOTE — DISCHARGE INSTRUCTIONS
Visit Discharge/Physician Orders     Discharge condition: Stable     Assessment of pain at discharge: yes     Anesthetic used:  lidocaine 4%      Discharge to: Home     Left via:Private automobile     Accompanied by: accompanied by none     ECF/HHA: [facility]- NOTE NEW ORDER 4/22/24     Dressing Orders:Cleanse wound to right leg with normal saline, apply ALGINATE AG to wound bed and cover ABD and wrap with UNNA boot and coban from base of toes to base of knees- change weekly at wound care center and twice a week at home with home health. (Please order Stas CoFlex TLC Calamine two layer for three times a week changes)     Keep wrap dry at all times. If wrap becomes wet, becomes painful or falls 2 inches- may remove wrap- do daily dressing changes and apply spandigrip.      Treatment Orders:Vasculars to be done 4/4- reviewed    EAT DIET WITH PROTEINS AND VITAMIN C  TAKE A MULTIVITAMIN DAILY IF NOT CONTRAINDICATED    4/15 Miconazole Ointment prescription sent to pharmacy- apply to feet, toes, and legs- avoiding wounds twice a day for one month -  at prescription   4/22 Culture taken - On doxycyline      [facility] followup visit _____1 week  _______________________  (Please note your next appointment above and if you are unable to keep, kindly give a 24 hour notice. Thank you.)     Physician signature:__________________________        If you experience any of the following, please call the Wound Care Center during business hours:     * Increase in Pain  * Temperature over 101  * Increase in drainage from your wound  * Drainage with a foul odor  * Bleeding  * Increase in swelling  * Need for compression bandage changes due to slippage, breakthrough drainage.     If you need medical attention outside of the business hours of the Wound Care Centers please contact your PCP or go to the nearest emergency room.

## 2024-04-17 NOTE — TELEPHONE ENCOUNTER
Discussed the patient regarding the wound, some drainage, concern, no history of fever or chills, patient does not wish to go to the emergency room, will empirically treated with doxycycline 100 minutes p.o. twice daily for 10 days, but patient was advised, if the wound gets any worse, any other symptoms come to the emergency room, all the questions were answered

## 2024-04-18 ENCOUNTER — TELEPHONE (OUTPATIENT)
Dept: CARDIOLOGY CLINIC | Age: 69
End: 2024-04-18

## 2024-04-18 NOTE — TELEPHONE ENCOUNTER
Patient called with BP/P readings since decreasing Hydralazine to 50 mg TID and decreasing Clonidine 0.3 mg one-half tablet TID on 4/11/24.    155/84 (83)  132/57 (82)  144/118 (79) most recent, today

## 2024-04-19 ENCOUNTER — TELEPHONE (OUTPATIENT)
Dept: AUDIOLOGY | Age: 69
End: 2024-04-19

## 2024-04-19 RX ORDER — HYDRALAZINE HYDROCHLORIDE 25 MG/1
25 TABLET, FILM COATED ORAL 3 TIMES DAILY
Qty: 90 TABLET | Refills: 11 | Status: SHIPPED | OUTPATIENT
Start: 2024-04-19

## 2024-04-19 RX ORDER — HYDRALAZINE HYDROCHLORIDE 25 MG/1
25 TABLET, FILM COATED ORAL 3 TIMES DAILY
COMMUNITY
End: 2024-04-19 | Stop reason: SDUPTHER

## 2024-04-19 RX ORDER — HYDRALAZINE HYDROCHLORIDE 50 MG/1
50 TABLET, FILM COATED ORAL 3 TIMES DAILY
COMMUNITY

## 2024-04-19 NOTE — TELEPHONE ENCOUNTER
Blood pressure is elevated and would recommend increasing hydralazine to 75 mg p.o. 3 times a day.  Monitor blood pressure daily and call me in 1 week

## 2024-04-19 NOTE — TELEPHONE ENCOUNTER
Patient notified of Dr. Boateng's recommendations.  Med list amended.  Hydralazine 25 mg e-scribed, she cannot cut the 50 mg tablets in half.

## 2024-04-19 NOTE — TELEPHONE ENCOUNTER
Called patient to answer her questions regarding hearing aids. Patient wants to see about getting new hearing aids.  She had hearing aids from [employer] that were BTE and she did not like behind the ear because they fell off all the time and she lost one. She purchased ITE hearing aids from somewhere else and now she is calling stating they will not stay in her ears. I saw her in Feb and fit seemed good on ITEs     Offered for patient to come to 7th floor (where we have [employer] software) for adjustments on the current ITE HAs and to look at fit but it would be a self pay charge since not purchased from us.    She can not get auth started for new hearing aids until she is seen by Dr Diaz for medical clearance as she has had a change in hearing since May of 2022.   She has an appt in May with Dr Diaz.     She will need an HAE if he approves and clears her for hearing aids.       WILL CONTACT DOWNTOWN AUDIOLOGIST TO SCAN ANY PAPER CHART SHE MAY HAVE ON PATIENT TO HER EMR

## 2024-04-22 ENCOUNTER — HOSPITAL ENCOUNTER (OUTPATIENT)
Dept: WOUND CARE | Age: 69
Discharge: HOME | End: 2024-04-22
Attending: SURGERY
Payer: MEDICARE

## 2024-04-22 VITALS
TEMPERATURE: 97.6 F | WEIGHT: 182 LBS | BODY MASS INDEX: 33.49 KG/M2 | SYSTOLIC BLOOD PRESSURE: 142 MMHG | HEIGHT: 62 IN | HEART RATE: 87 BPM | RESPIRATION RATE: 18 BRPM | DIASTOLIC BLOOD PRESSURE: 84 MMHG

## 2024-04-22 DIAGNOSIS — L97.212 LOWER LIMB ULCER, CALF, RIGHT, WITH FAT LAYER EXPOSED (HCC): ICD-10-CM

## 2024-04-22 DIAGNOSIS — I70.232 ATHEROSCLEROSIS OF NATIVE ARTERY OF RIGHT LOWER EXTREMITY WITH ULCERATION OF CALF (HCC): Primary | ICD-10-CM

## 2024-04-22 DIAGNOSIS — L97.211 LOWER LIMB ULCER, CALF, RIGHT, LIMITED TO BREAKDOWN OF SKIN (HCC): ICD-10-CM

## 2024-04-22 PROCEDURE — 11042 DBRDMT SUBQ TIS 1ST 20SQCM/<: CPT | Performed by: SURGERY

## 2024-04-22 PROCEDURE — 11042 DBRDMT SUBQ TIS 1ST 20SQCM/<: CPT

## 2024-04-22 PROCEDURE — 87070 CULTURE OTHR SPECIMN AEROBIC: CPT

## 2024-04-22 PROCEDURE — 87205 SMEAR GRAM STAIN: CPT

## 2024-04-22 PROCEDURE — 87077 CULTURE AEROBIC IDENTIFY: CPT

## 2024-04-22 RX ORDER — SODIUM CHLOR/HYPOCHLOROUS ACID 0.033 %
SOLUTION, IRRIGATION IRRIGATION ONCE
OUTPATIENT
Start: 2024-04-22 | End: 2024-04-22

## 2024-04-22 RX ORDER — LIDOCAINE HYDROCHLORIDE 20 MG/ML
JELLY TOPICAL ONCE
OUTPATIENT
Start: 2024-04-22 | End: 2024-04-22

## 2024-04-22 RX ORDER — LIDOCAINE 40 MG/G
CREAM TOPICAL ONCE
OUTPATIENT
Start: 2024-04-22 | End: 2024-04-22

## 2024-04-22 RX ORDER — CLOBETASOL PROPIONATE 0.5 MG/G
OINTMENT TOPICAL ONCE
OUTPATIENT
Start: 2024-04-22 | End: 2024-04-22

## 2024-04-22 RX ORDER — IBUPROFEN 200 MG
TABLET ORAL ONCE
OUTPATIENT
Start: 2024-04-22 | End: 2024-04-22

## 2024-04-22 RX ORDER — TRIAMCINOLONE ACETONIDE 1 MG/G
OINTMENT TOPICAL ONCE
OUTPATIENT
Start: 2024-04-22 | End: 2024-04-22

## 2024-04-22 RX ORDER — BETAMETHASONE DIPROPIONATE 0.5 MG/G
CREAM TOPICAL ONCE
OUTPATIENT
Start: 2024-04-22 | End: 2024-04-22

## 2024-04-22 RX ORDER — GENTAMICIN SULFATE 1 MG/G
OINTMENT TOPICAL ONCE
OUTPATIENT
Start: 2024-04-22 | End: 2024-04-22

## 2024-04-22 RX ORDER — LIDOCAINE HYDROCHLORIDE 40 MG/ML
SOLUTION TOPICAL ONCE
OUTPATIENT
Start: 2024-04-22 | End: 2024-04-22

## 2024-04-22 RX ORDER — LIDOCAINE HYDROCHLORIDE 40 MG/ML
SOLUTION TOPICAL ONCE
Status: COMPLETED | OUTPATIENT
Start: 2024-04-22 | End: 2024-04-22

## 2024-04-22 RX ORDER — LIDOCAINE 50 MG/G
OINTMENT TOPICAL ONCE
OUTPATIENT
Start: 2024-04-22 | End: 2024-04-22

## 2024-04-22 RX ORDER — BACITRACIN ZINC AND POLYMYXIN B SULFATE 500; 1000 [USP'U]/G; [USP'U]/G
OINTMENT TOPICAL ONCE
OUTPATIENT
Start: 2024-04-22 | End: 2024-04-22

## 2024-04-22 RX ORDER — BACITRACIN ZINC 500 [USP'U]/G
OINTMENT TOPICAL ONCE
OUTPATIENT
Start: 2024-04-22 | End: 2024-04-22

## 2024-04-22 RX ADMIN — LIDOCAINE HYDROCHLORIDE 5 ML: 40 SOLUTION TOPICAL at 09:22

## 2024-04-22 ASSESSMENT — PAIN DESCRIPTION - ORIENTATION: ORIENTATION: RIGHT

## 2024-04-22 ASSESSMENT — PAIN DESCRIPTION - FREQUENCY: FREQUENCY: CONTINUOUS

## 2024-04-22 ASSESSMENT — PAIN SCALES - GENERAL: PAINLEVEL_OUTOF10: 10

## 2024-04-22 ASSESSMENT — PAIN DESCRIPTION - PAIN TYPE: TYPE: CHRONIC PAIN

## 2024-04-22 ASSESSMENT — PAIN DESCRIPTION - DESCRIPTORS: DESCRIPTORS: BURNING;ACHING

## 2024-04-22 ASSESSMENT — PAIN DESCRIPTION - LOCATION: LOCATION: LEG

## 2024-04-22 NOTE — PROGRESS NOTES
drainage.     If you need medical attention outside of the business hours of the Wound Care Centers please contact your PCP or go to the nearest emergency room.        Electronically signed by Pino Hernandez MD on 4/22/2024 at 9:42 AM

## 2024-04-22 NOTE — PLAN OF CARE
Problem: Pain  Goal: Verbalizes/displays adequate comfort level or baseline comfort level  Outcome: Progressing     Problem: Wound:  Goal: Will show signs of wound healing; wound closure and no evidence of infection  Description: Will show signs of wound healing; wound closure and no evidence of infection  Outcome: Progressing     Problem: Chronic Conditions and Co-morbidities  Goal: Patient's chronic conditions and co-morbidity symptoms are monitored and maintained or improved  Outcome: Adequate for Discharge

## 2024-04-23 NOTE — DISCHARGE INSTRUCTIONS
Visit Discharge/Physician Orders     Discharge condition: Stable     Assessment of pain at discharge: yes     Anesthetic used:  lidocaine 4%      Discharge to: Home     Left via:Private automobile     Accompanied by: accompanied by none     ECF/HHA: [facility]     Dressing Orders:Cleanse wound to right leg with normal saline, apply ALGINATE AG to wound bed and cover ABD and wrap with UNNA boot and coban from base of toes to base of knees- change weekly at wound care center and twice a week at home with home health. (Please order [place] CoFlex TLC Calamine two layer for three times a week changes)      Keep wrap dry at all times. If wrap becomes wet, becomes painful or falls 2 inches- may remove wrap- do daily dressing changes and apply spandigrip.      Treatment Orders:Vasculars to be done 4/4- reviewed    EAT DIET WITH PROTEINS AND VITAMIN C  TAKE A MULTIVITAMIN DAILY IF NOT CONTRAINDICATED    4/15 Miconazole Ointment prescription sent to pharmacy- apply to feet, toes, and legs- avoiding wounds twice a day for one month -  at prescription   4/22 Culture taken - On doxycyline      [facility] followup visit _____1 week  _______________________  (Please note your next appointment above and if you are unable to keep, kindly give a 24 hour notice. Thank you.)     Physician signature:__________________________        If you experience any of the following, please call the Wound Care Center during business hours:     * Increase in Pain  * Temperature over 101  * Increase in drainage from your wound  * Drainage with a foul odor  * Bleeding  * Increase in swelling  * Need for compression bandage changes due to slippage, breakthrough drainage.     If you need medical attention outside of the business hours of the Wound Care Centers please contact your PCP or go to the nearest emergency room.

## 2024-04-24 ENCOUNTER — HOSPITAL ENCOUNTER (OUTPATIENT)
Dept: OTHER | Age: 69
Setting detail: THERAPIES SERIES
Discharge: HOME | End: 2024-04-24
Payer: MEDICARE

## 2024-04-24 VITALS
BODY MASS INDEX: 34.75 KG/M2 | OXYGEN SATURATION: 95 % | DIASTOLIC BLOOD PRESSURE: 74 MMHG | SYSTOLIC BLOOD PRESSURE: 132 MMHG | WEIGHT: 190 LBS | HEART RATE: 74 BPM | RESPIRATION RATE: 18 BRPM

## 2024-04-24 LAB
ANION GAP SERPL CALCULATED.3IONS-SCNC: 11 MMOL/L (ref 7–16)
BNP SERPL-MCNC: 1187 PG/ML (ref 0–125)
BUN SERPL-MCNC: 29 MG/DL (ref 6–23)
CALCIUM SERPL-MCNC: 8.9 MG/DL (ref 8.6–10.2)
CHLORIDE SERPL-SCNC: 108 MMOL/L (ref 98–107)
CO2 SERPL-SCNC: 23 MMOL/L (ref 22–29)
CREAT SERPL-MCNC: 1.6 MG/DL (ref 0.5–1)
GFR SERPL CREATININE-BSD FRML MDRD: 34 ML/MIN/1.73M2
GLUCOSE SERPL-MCNC: 127 MG/DL (ref 74–99)
POTASSIUM SERPL-SCNC: 3.5 MMOL/L (ref 3.5–5)
SODIUM SERPL-SCNC: 142 MMOL/L (ref 132–146)

## 2024-04-24 PROCEDURE — 80048 BASIC METABOLIC PNL TOTAL CA: CPT

## 2024-04-24 PROCEDURE — 99214 OFFICE O/P EST MOD 30 MIN: CPT

## 2024-04-24 PROCEDURE — 36415 COLL VENOUS BLD VENIPUNCTURE: CPT

## 2024-04-24 PROCEDURE — 83880 ASSAY OF NATRIURETIC PEPTIDE: CPT

## 2024-04-24 ASSESSMENT — PATIENT HEALTH QUESTIONNAIRE - PHQ9
SUM OF ALL RESPONSES TO PHQ QUESTIONS 1-9: 0
2. FEELING DOWN, DEPRESSED OR HOPELESS: NOT AT ALL
SUM OF ALL RESPONSES TO PHQ QUESTIONS 1-9: 0
SUM OF ALL RESPONSES TO PHQ9 QUESTIONS 1 & 2: 0
1. LITTLE INTEREST OR PLEASURE IN DOING THINGS: NOT AT ALL

## 2024-04-24 NOTE — PROGRESS NOTES
Congestive Heart Failure Clinic   [facility]       Referring Provider: Dr. Barclay  Primary Care Physician: Lita Swain APRN - NP   Cardiologist: Dr. barclay  Nephrologist:       HISTORY OF PRESENT ILLNESS:     Steph Gonzáles is a 68 y.o. (1955) female with a history of HFrEF (EF < 40%), most recent EF:  Lab Results   Component Value Date    LVEF 40 04/16/2022         She presents to the CHF clinic for ongoing evaluation and monitoring of heart failure.    In the CHF clinic today she denies any adverse symptoms except:  [] Dizziness or lightheadedness   [] Syncope or near syncope  [] Recent Fall  [] Shortness of breath at rest   [x] Dyspnea with exertion  [] Decline in functional capacity (unable to perform activities they had previously been able to do)  [] Fatigue   [] Orthopnea  [] PND  [] Nocturnal cough  [] Hemoptysis  [] Chest pain, pressure, or discomfort  [] Palpitations  [] Abdominal distention  [] Abdominal bloating  [] Early satiety  [] Blood in stool   [] Diarrhea  [] Constipation  [] Nausea/Vomiting  [] Decreased urinary response to oral diuretic   [] Scrotal swelling   [] Lower extremity edema  [] Used PRN doses of oral diuretic   [] Weight gain    Wt Readings from Last 3 Encounters:   04/24/24 86.2 kg (190 lb)   04/22/24 82.6 kg (182 lb)   04/08/24 82.6 kg (182 lb)           SOCIAL HISTORY:  [x] Denies tobacco, alcohol or illicit drug abuse  [] Tobacco use:  [] ETOH use:  [] Illicit drug use:        MEDICATIONS:    Allergies   Allergen Reactions    Latex Hives    Bee Venom Anaphylaxis    Dilaudid [Hydromorphone Hcl] Itching    Dye [Iodides] Hives and Shortness Of Breath    Keflex [Cephalexin] Itching and Rash    Bee Pollen     Cetirizine & Related Hives    Fentanyl Itching    Lasix [Furosemide] Other (See Comments)     Pt states she cramps up and gets headaches    Levaquin [Levofloxacin In D5w] Hives    Lipitor      MUSCLE SPASMS    Lyrica [Pregabalin]

## 2024-04-25 LAB
MICROORGANISM SPEC CULT: ABNORMAL
MICROORGANISM/AGENT SPEC: ABNORMAL
SPECIMEN DESCRIPTION: ABNORMAL

## 2024-04-29 ENCOUNTER — HOSPITAL ENCOUNTER (OUTPATIENT)
Dept: WOUND CARE | Age: 69
Discharge: HOME | End: 2024-04-29
Attending: SURGERY
Payer: MEDICARE

## 2024-04-29 VITALS
SYSTOLIC BLOOD PRESSURE: 134 MMHG | RESPIRATION RATE: 18 BRPM | BODY MASS INDEX: 34.96 KG/M2 | TEMPERATURE: 99.7 F | HEIGHT: 62 IN | DIASTOLIC BLOOD PRESSURE: 86 MMHG | WEIGHT: 190 LBS | HEART RATE: 78 BPM

## 2024-04-29 DIAGNOSIS — L97.212 LOWER LIMB ULCER, CALF, RIGHT, WITH FAT LAYER EXPOSED (HCC): ICD-10-CM

## 2024-04-29 DIAGNOSIS — I70.232 ATHEROSCLEROSIS OF NATIVE ARTERY OF RIGHT LOWER EXTREMITY WITH ULCERATION OF CALF (HCC): Primary | ICD-10-CM

## 2024-04-29 DIAGNOSIS — L97.211 LOWER LIMB ULCER, CALF, RIGHT, LIMITED TO BREAKDOWN OF SKIN (HCC): ICD-10-CM

## 2024-04-29 PROCEDURE — 11042 DBRDMT SUBQ TIS 1ST 20SQCM/<: CPT

## 2024-04-29 PROCEDURE — 11042 DBRDMT SUBQ TIS 1ST 20SQCM/<: CPT | Performed by: SURGERY

## 2024-04-29 RX ORDER — LIDOCAINE 50 MG/G
OINTMENT TOPICAL ONCE
OUTPATIENT
Start: 2024-04-29 | End: 2024-04-29

## 2024-04-29 RX ORDER — SODIUM CHLOR/HYPOCHLOROUS ACID 0.033 %
SOLUTION, IRRIGATION IRRIGATION ONCE
OUTPATIENT
Start: 2024-04-29 | End: 2024-04-29

## 2024-04-29 RX ORDER — LIDOCAINE HYDROCHLORIDE 20 MG/ML
JELLY TOPICAL ONCE
OUTPATIENT
Start: 2024-04-29 | End: 2024-04-29

## 2024-04-29 RX ORDER — LIDOCAINE HYDROCHLORIDE 40 MG/ML
SOLUTION TOPICAL ONCE
OUTPATIENT
Start: 2024-04-29 | End: 2024-04-29

## 2024-04-29 RX ORDER — BACITRACIN ZINC AND POLYMYXIN B SULFATE 500; 1000 [USP'U]/G; [USP'U]/G
OINTMENT TOPICAL ONCE
OUTPATIENT
Start: 2024-04-29 | End: 2024-04-29

## 2024-04-29 RX ORDER — LIDOCAINE 40 MG/G
CREAM TOPICAL ONCE
OUTPATIENT
Start: 2024-04-29 | End: 2024-04-29

## 2024-04-29 RX ORDER — LIDOCAINE HYDROCHLORIDE 40 MG/ML
SOLUTION TOPICAL ONCE
Status: COMPLETED | OUTPATIENT
Start: 2024-04-29 | End: 2024-04-29

## 2024-04-29 RX ORDER — BACITRACIN ZINC 500 [USP'U]/G
OINTMENT TOPICAL ONCE
OUTPATIENT
Start: 2024-04-29 | End: 2024-04-29

## 2024-04-29 RX ORDER — BETAMETHASONE DIPROPIONATE 0.5 MG/G
CREAM TOPICAL ONCE
OUTPATIENT
Start: 2024-04-29 | End: 2024-04-29

## 2024-04-29 RX ORDER — TRIAMCINOLONE ACETONIDE 1 MG/G
OINTMENT TOPICAL ONCE
OUTPATIENT
Start: 2024-04-29 | End: 2024-04-29

## 2024-04-29 RX ORDER — CLOBETASOL PROPIONATE 0.5 MG/G
OINTMENT TOPICAL ONCE
OUTPATIENT
Start: 2024-04-29 | End: 2024-04-29

## 2024-04-29 RX ORDER — IBUPROFEN 200 MG
TABLET ORAL ONCE
OUTPATIENT
Start: 2024-04-29 | End: 2024-04-29

## 2024-04-29 RX ORDER — GENTAMICIN SULFATE 1 MG/G
OINTMENT TOPICAL ONCE
OUTPATIENT
Start: 2024-04-29 | End: 2024-04-29

## 2024-04-29 RX ADMIN — LIDOCAINE HYDROCHLORIDE 10 ML: 40 SOLUTION TOPICAL at 09:45

## 2024-04-29 ASSESSMENT — PAIN DESCRIPTION - PAIN TYPE: TYPE: CHRONIC PAIN

## 2024-04-29 ASSESSMENT — PAIN DESCRIPTION - ONSET: ONSET: ON-GOING

## 2024-04-29 ASSESSMENT — PAIN DESCRIPTION - LOCATION: LOCATION: LEG

## 2024-04-29 ASSESSMENT — PAIN DESCRIPTION - DESCRIPTORS: DESCRIPTORS: BURNING;ACHING

## 2024-04-29 ASSESSMENT — PAIN DESCRIPTION - ORIENTATION: ORIENTATION: RIGHT

## 2024-04-29 ASSESSMENT — PAIN DESCRIPTION - FREQUENCY: FREQUENCY: CONTINUOUS

## 2024-04-29 ASSESSMENT — PAIN SCALES - GENERAL: PAINLEVEL_OUTOF10: 10

## 2024-04-29 ASSESSMENT — PAIN - FUNCTIONAL ASSESSMENT: PAIN_FUNCTIONAL_ASSESSMENT: PREVENTS OR INTERFERES SOME ACTIVE ACTIVITIES AND ADLS

## 2024-04-29 NOTE — PLAN OF CARE
Problem: Pain  Goal: Verbalizes/displays adequate comfort level or baseline comfort level  Outcome: Progressing     Problem: Wound:  Goal: Will show signs of wound healing; wound closure and no evidence of infection  Description: Will show signs of wound healing; wound closure and no evidence of infection  Outcome: Progressing     Problem: Chronic Conditions and Co-morbidities  Goal: Patient's chronic conditions and co-morbidity symptoms are monitored and maintained or improved  Outcome: Adequate for Discharge     Problem: Chronic Conditions and Co-morbidities  Goal: Patient's chronic conditions and co-morbidity symptoms are monitored and maintained or improved  Outcome: Adequate for Discharge

## 2024-04-29 NOTE — PROGRESS NOTES
artery angioplasty 10/31/2019    Tinea pedis of both feet 04/15/2024    Tobacco abuse     Tobacco abuse 10/11/2017    Tobacco dependence 06/30/2022    Tubular adenoma polyp of rectum     Urinary tract infection due to ESBL Klebsiella 03/08/2017    Ventral hernia      Past Surgical History:   Procedure Laterality Date    ANGIOPLASTY  11/13/2018    Dr. Gregg DCB & athrectomy L SFA & Popliteal    BREAST SURGERY  2000    bilateral reduction    BRONCHIAL BRUSH BIOPSY  1/25/2013    Dr Villegas    CARDIAC CATHETERIZATION  04/20/2015    Dr. Cardoso    CARDIAC SURGERY  12/2011     DR. ROCHA, [facility],  follows Dr Crafword    CHOLECYSTECTOMY  YRS AGO    OPEN    COLONOSCOPY  11/15/2013    Dr Borja    COLONOSCOPY  12/23/2016    Dr Borja-tubular adenoma & hyperplastic polyps, melanosis coli (repeat one year 12/2017)    CORONARY ARTERY BYPASS GRAFT      ECHO COMPLETE  10/9/2013         ENDOSCOPY, COLON, DIAGNOSTIC  04/18/2017    GALLBLADDER SURGERY      gallstones removed, [facility] 8/2017    HYSTERECTOMY (CERVIX STATUS UNKNOWN)      JOINT REPLACEMENT  2011    LEFT KNEE    KNEE SURGERY      left knee replacement    LARYNGOSCOPY N/A 4/4/2022    DIRECT LARYNGOSCOPY WITH BIOPSY performed by Jose Diaz DO at [facility] OR    LARYNGOSCOPY Bilateral 10/4/2023    DIRECT LARYNGOSCOPY AND BIOPSY RIGHT FALSE VOCAL CORD performed by Jose Diaz DO at [facility] OR    LAYER WOUND CLOSURE Left [id number]    LIVER BIOPSY  1/8/2016    [facility]    POLYSOMNOGRAPHY  1/2016    [facility]    UPPER GASTROINTESTINAL ENDOSCOPY  12/20/12    UPPER GASTROINTESTINAL ENDOSCOPY  12/23/2016    Dr Borja    UPPER GASTROINTESTINAL ENDOSCOPY  02/08/2017     Family History   Problem Relation Age of Onset    Colon Cancer Mother     Cancer Mother     Asthma Father     Colon Cancer Brother     Breast Cancer Maternal Aunt 74     Social History     Tobacco Use    Smoking status: Every Day     Current packs/day: 0.50     Average packs/day: 0.5 packs/day for 49.7

## 2024-05-01 NOTE — DISCHARGE INSTRUCTIONS
Visit Discharge/Physician Orders     Discharge condition: Stable     Assessment of pain at discharge: yes     Anesthetic used:  lidocaine 4%      Discharge to: Home     Left via:Private automobile     Accompanied by: accompanied by none     ECF/HHA: [facility]     Dressing Orders:Cleanse wound to right leg with normal saline, apply ALGINATE AG to wound bed and cover ABD and wrap with UNNA boot and coban from base of toes to base of knees- change weekly at wound care center and twice a week at home with home health. (Please order [place] CoFlex TLC Calamine two layer for three times a week changes)      Keep wrap dry at all times. If wrap becomes wet, becomes painful or falls 2 inches- may remove wrap- do daily dressing changes and apply spandigrip.      Treatment Orders:Vasculars to be done 4/4- reviewed    EAT DIET WITH PROTEINS AND VITAMIN C  TAKE A MULTIVITAMIN DAILY IF NOT CONTRAINDICATED    4/15 Miconazole Ointment prescription sent to pharmacy- apply to feet, toes, and legs- avoiding wounds twice a day for one month -  at prescription   4/22 Culture taken - On doxycyline      [facility] followup visit _____1 week  in [place] _______________________  (Please note your next appointment above and if you are unable to keep, kindly give a 24 hour notice. Thank you.)     Physician signature:__________________________        If you experience any of the following, please call the Wound Care Center during business hours:     * Increase in Pain  * Temperature over 101  * Increase in drainage from your wound  * Drainage with a foul odor  * Bleeding  * Increase in swelling  * Need for compression bandage changes due to slippage, breakthrough drainage.     If you need medical attention outside of the business hours of the Wound Care Centers please contact your PCP or go to the nearest emergency room.

## 2024-05-06 ENCOUNTER — TELEPHONE (OUTPATIENT)
Dept: WOUND CARE | Age: 69
End: 2024-05-06

## 2024-05-06 ENCOUNTER — HOSPITAL ENCOUNTER (OUTPATIENT)
Dept: WOUND CARE | Age: 69
Discharge: HOME | End: 2024-05-06
Attending: SURGERY
Payer: MEDICARE

## 2024-05-06 VITALS
TEMPERATURE: 97.9 F | BODY MASS INDEX: 33.13 KG/M2 | SYSTOLIC BLOOD PRESSURE: 132 MMHG | WEIGHT: 180 LBS | HEART RATE: 84 BPM | HEIGHT: 62 IN | DIASTOLIC BLOOD PRESSURE: 72 MMHG | RESPIRATION RATE: 18 BRPM

## 2024-05-06 DIAGNOSIS — I70.232 ATHEROSCLEROSIS OF NATIVE ARTERY OF RIGHT LOWER EXTREMITY WITH ULCERATION OF CALF (HCC): Primary | ICD-10-CM

## 2024-05-06 DIAGNOSIS — L97.212 LOWER LIMB ULCER, CALF, RIGHT, WITH FAT LAYER EXPOSED (HCC): ICD-10-CM

## 2024-05-06 DIAGNOSIS — L97.211 LOWER LIMB ULCER, CALF, RIGHT, LIMITED TO BREAKDOWN OF SKIN (HCC): ICD-10-CM

## 2024-05-06 DIAGNOSIS — L97.211 LOWER LIMB ULCER, CALF, RIGHT, LIMITED TO BREAKDOWN OF SKIN (HCC): Primary | ICD-10-CM

## 2024-05-06 DIAGNOSIS — I70.232 ATHEROSCLEROSIS OF NATIVE ARTERY OF RIGHT LOWER EXTREMITY WITH ULCERATION OF CALF (HCC): ICD-10-CM

## 2024-05-06 PROCEDURE — 11042 DBRDMT SUBQ TIS 1ST 20SQCM/<: CPT | Performed by: SURGERY

## 2024-05-06 PROCEDURE — 11042 DBRDMT SUBQ TIS 1ST 20SQCM/<: CPT

## 2024-05-06 RX ORDER — CLOBETASOL PROPIONATE 0.5 MG/G
OINTMENT TOPICAL ONCE
OUTPATIENT
Start: 2024-05-06 | End: 2024-05-06

## 2024-05-06 RX ORDER — LIDOCAINE HYDROCHLORIDE 40 MG/ML
SOLUTION TOPICAL ONCE
Status: COMPLETED | OUTPATIENT
Start: 2024-05-06 | End: 2024-05-06

## 2024-05-06 RX ORDER — BETAMETHASONE DIPROPIONATE 0.5 MG/G
CREAM TOPICAL ONCE
OUTPATIENT
Start: 2024-05-06 | End: 2024-05-06

## 2024-05-06 RX ORDER — BACITRACIN ZINC 500 [USP'U]/G
OINTMENT TOPICAL ONCE
OUTPATIENT
Start: 2024-05-06 | End: 2024-05-06

## 2024-05-06 RX ORDER — SODIUM CHLOR/HYPOCHLOROUS ACID 0.033 %
SOLUTION, IRRIGATION IRRIGATION ONCE
OUTPATIENT
Start: 2024-05-06 | End: 2024-05-06

## 2024-05-06 RX ORDER — BACITRACIN ZINC AND POLYMYXIN B SULFATE 500; 1000 [USP'U]/G; [USP'U]/G
OINTMENT TOPICAL ONCE
OUTPATIENT
Start: 2024-05-06 | End: 2024-05-06

## 2024-05-06 RX ORDER — LIDOCAINE HYDROCHLORIDE 20 MG/ML
JELLY TOPICAL ONCE
OUTPATIENT
Start: 2024-05-06 | End: 2024-05-06

## 2024-05-06 RX ORDER — TRIAMCINOLONE ACETONIDE 1 MG/G
OINTMENT TOPICAL ONCE
OUTPATIENT
Start: 2024-05-06 | End: 2024-05-06

## 2024-05-06 RX ORDER — LIDOCAINE HYDROCHLORIDE 40 MG/ML
SOLUTION TOPICAL ONCE
OUTPATIENT
Start: 2024-05-06 | End: 2024-05-06

## 2024-05-06 RX ORDER — LIDOCAINE 50 MG/G
OINTMENT TOPICAL ONCE
OUTPATIENT
Start: 2024-05-06 | End: 2024-05-06

## 2024-05-06 RX ORDER — LIDOCAINE 40 MG/G
CREAM TOPICAL ONCE
OUTPATIENT
Start: 2024-05-06 | End: 2024-05-06

## 2024-05-06 RX ORDER — IBUPROFEN 200 MG
TABLET ORAL ONCE
OUTPATIENT
Start: 2024-05-06 | End: 2024-05-06

## 2024-05-06 RX ORDER — GENTAMICIN SULFATE 1 MG/G
OINTMENT TOPICAL ONCE
OUTPATIENT
Start: 2024-05-06 | End: 2024-05-06

## 2024-05-06 RX ADMIN — LIDOCAINE HYDROCHLORIDE 7 ML: 40 SOLUTION TOPICAL at 09:33

## 2024-05-06 ASSESSMENT — PAIN SCALES - GENERAL: PAINLEVEL_OUTOF10: 10

## 2024-05-06 ASSESSMENT — PAIN DESCRIPTION - LOCATION: LOCATION: LEG

## 2024-05-06 ASSESSMENT — PAIN DESCRIPTION - ORIENTATION: ORIENTATION: RIGHT

## 2024-05-06 ASSESSMENT — PAIN DESCRIPTION - DESCRIPTORS: DESCRIPTORS: SHOOTING

## 2024-05-06 NOTE — PLAN OF CARE
Problem: Chronic Conditions and Co-morbidities  Goal: Patient's chronic conditions and co-morbidity symptoms are monitored and maintained or improved  Outcome: Progressing        Problem: Wound:  Goal: Will show signs of wound healing; wound closure and no evidence of infection  Description: Will show signs of wound healing; wound closure and no evidence of infection  Outcome: Progressing     Problem: Pain  Goal: Verbalizes/displays adequate comfort level or baseline comfort level  5/6/2024 0953 by Ashley Thompson, RN  Outcome: Not Progressing  5/6/2024 0953 by Ashley Thompson, RN  Outcome: Progressing

## 2024-05-06 NOTE — PLAN OF CARE
Problem: Pain  Goal: Verbalizes/displays adequate comfort level or baseline comfort level  5/6/2024 0953 by Ashley Thompson, RN  Outcome: Not Progressing  5/6/2024 0953 by Ashley Thompson, RN  Outcome: Progressing

## 2024-05-06 NOTE — PROGRESS NOTES
On the right side, Doppler is mild iliac and mainly femoral-popliteal arterial  occlusive disease with an ankle-brachial index of 0.81, with adequate flow to  the foot based upon the pulse volume recording with metatarsal     On the left side, mild right iliac and mainly femoral-popliteal arterial  occlusive disease, with ankle arm index 0.74 with adequate flow to the foot  based upon the pulse volume recording over the metatarsal     Bilaterally significantly diminished pulse volume recordings noted over the toes  almost flat right more than the left         At the level of the ulcer, of the right distal calf, based upon the pulse volume recordings of the calf and and the ankle appears to have reportedly adequate collateral flow for tissue healing      Trial of Pletal was deferred because of history of congestive heart failure in the recent past as well as being on Entresto, if necessary will discuss with her cardiologist in the future but continue the low-dose aspirin, all her questions were answered  The patient was counseled to stop smoking completely    4/15/2024  Right leg ulcer better, some of the black eschar debrided, including subcu  Patient has a tinea pedis infection, recommended her to apply topical antifungal cream twice a day for 1 month  Rediscussed the patient regarding the arterial Doppler studies, for now follow conservatively as she does appear to have adequate collateral flow to the foot for tissue healing  4/22/2024  Patient called the office last week stating that she has some purulent drainage, concerned, empirically put her on doxycycline 100 minutes p.o. twice daily, tells me she is doing better, no history of fever or chills  The wound, some black eschar and some exudate, wound cultures done today  As the patient has satisfactory arterial circulation with adequate collateral flow, consider compression wrapping  4/29/2024  Wound looks improved, patient already on doxycycline, has

## 2024-05-06 NOTE — TELEPHONE ENCOUNTER
Patient called in stating that her UNNA boot is bothering her and her leg is itchy. This nurse called patient back and stated that she can remove the UNNA boot. Explained to patient that she can unravel the compression wrap to take off that patient didn't need scissors. Explained to patient that office would be closing soon and can call home health to see if they can do a visit to help patient take wrap off. Patient stated she would try and she would call home health

## 2024-05-07 ENCOUNTER — TELEPHONE (OUTPATIENT)
Dept: CARDIOLOGY CLINIC | Age: 69
End: 2024-05-07

## 2024-05-08 ENCOUNTER — HOSPITAL ENCOUNTER (OUTPATIENT)
Age: 69
Setting detail: OBSERVATION
Discharge: HOME | End: 2024-05-11
Attending: EMERGENCY MEDICINE | Admitting: INTERNAL MEDICINE
Payer: MEDICARE

## 2024-05-08 ENCOUNTER — TELEPHONE (OUTPATIENT)
Dept: CARDIOLOGY CLINIC | Age: 69
End: 2024-05-08

## 2024-05-08 DIAGNOSIS — R55 SYNCOPE AND COLLAPSE: ICD-10-CM

## 2024-05-08 DIAGNOSIS — I44.30 ATRIOVENTRICULAR BLOCK: Primary | ICD-10-CM

## 2024-05-08 DIAGNOSIS — R00.1 BRADYCARDIA: ICD-10-CM

## 2024-05-08 LAB
ANION GAP SERPL CALCULATED.3IONS-SCNC: 9 MMOL/L (ref 7–16)
BASOPHILS # BLD: 0.02 K/UL (ref 0–0.2)
BASOPHILS NFR BLD: 0 % (ref 0–2)
BUN SERPL-MCNC: 28 MG/DL (ref 6–23)
CALCIUM SERPL-MCNC: 9 MG/DL (ref 8.6–10.2)
CHLORIDE SERPL-SCNC: 106 MMOL/L (ref 98–107)
CO2 SERPL-SCNC: 25 MMOL/L (ref 22–29)
CREAT SERPL-MCNC: 1.8 MG/DL (ref 0.5–1)
EOSINOPHIL # BLD: 0.1 K/UL (ref 0.05–0.5)
EOSINOPHILS RELATIVE PERCENT: 2 % (ref 0–6)
ERYTHROCYTE [DISTWIDTH] IN BLOOD BY AUTOMATED COUNT: 11.9 % (ref 11.5–15)
GFR, ESTIMATED: 30 ML/MIN/1.73M2
GLUCOSE BLD-MCNC: 107 MG/DL (ref 74–99)
GLUCOSE SERPL-MCNC: 146 MG/DL (ref 74–99)
HCT VFR BLD AUTO: 35.3 % (ref 34–48)
HGB BLD-MCNC: 11.2 G/DL (ref 11.5–15.5)
IMM GRANULOCYTES # BLD AUTO: <0.03 K/UL (ref 0–0.58)
IMM GRANULOCYTES NFR BLD: 0 % (ref 0–5)
INR PPP: 1.1
LYMPHOCYTES NFR BLD: 1.2 K/UL (ref 1.5–4)
LYMPHOCYTES RELATIVE PERCENT: 18 % (ref 20–42)
MAGNESIUM SERPL-MCNC: 2 MG/DL (ref 1.6–2.6)
MCH RBC QN AUTO: 31.5 PG (ref 26–35)
MCHC RBC AUTO-ENTMCNC: 31.7 G/DL (ref 32–34.5)
MCV RBC AUTO: 99.4 FL (ref 80–99.9)
MONOCYTES NFR BLD: 0.33 K/UL (ref 0.1–0.95)
MONOCYTES NFR BLD: 5 % (ref 2–12)
NEUTROPHILS NFR BLD: 75 % (ref 43–80)
NEUTS SEG NFR BLD: 5.07 K/UL (ref 1.8–7.3)
PLATELET # BLD AUTO: 277 K/UL (ref 130–450)
PMV BLD AUTO: 10.4 FL (ref 7–12)
POTASSIUM SERPL-SCNC: 4.3 MMOL/L (ref 3.5–5)
PROTHROMBIN TIME: 12 SEC (ref 9.3–12.4)
RBC # BLD AUTO: 3.55 M/UL (ref 3.5–5.5)
SODIUM SERPL-SCNC: 140 MMOL/L (ref 132–146)
TROPONIN I SERPL HS-MCNC: 17 NG/L (ref 0–9)
WBC OTHER # BLD: 6.7 K/UL (ref 4.5–11.5)

## 2024-05-08 PROCEDURE — 84484 ASSAY OF TROPONIN QUANT: CPT

## 2024-05-08 PROCEDURE — 80048 BASIC METABOLIC PNL TOTAL CA: CPT

## 2024-05-08 PROCEDURE — G0378 HOSPITAL OBSERVATION PER HR: HCPCS

## 2024-05-08 PROCEDURE — 2580000003 HC RX 258: Performed by: INTERNAL MEDICINE

## 2024-05-08 PROCEDURE — 85025 COMPLETE CBC W/AUTO DIFF WBC: CPT

## 2024-05-08 PROCEDURE — 6370000000 HC RX 637 (ALT 250 FOR IP): Performed by: INTERNAL MEDICINE

## 2024-05-08 PROCEDURE — 85610 PROTHROMBIN TIME: CPT

## 2024-05-08 PROCEDURE — 99285 EMERGENCY DEPT VISIT HI MDM: CPT

## 2024-05-08 PROCEDURE — 99222 1ST HOSP IP/OBS MODERATE 55: CPT | Performed by: INTERNAL MEDICINE

## 2024-05-08 PROCEDURE — 82962 GLUCOSE BLOOD TEST: CPT

## 2024-05-08 PROCEDURE — 93005 ELECTROCARDIOGRAM TRACING: CPT | Performed by: EMERGENCY MEDICINE

## 2024-05-08 PROCEDURE — 83735 ASSAY OF MAGNESIUM: CPT

## 2024-05-08 RX ORDER — ATORVASTATIN CALCIUM 20 MG/1
10 TABLET, FILM COATED ORAL DAILY
Status: DISCONTINUED | OUTPATIENT
Start: 2024-05-08 | End: 2024-05-11 | Stop reason: HOSPADM

## 2024-05-08 RX ORDER — BENZONATATE 100 MG/1
100 CAPSULE ORAL 3 TIMES DAILY PRN
Status: DISCONTINUED | OUTPATIENT
Start: 2024-05-08 | End: 2024-05-11 | Stop reason: HOSPADM

## 2024-05-08 RX ORDER — ACETAMINOPHEN 325 MG/1
650 TABLET ORAL EVERY 6 HOURS PRN
Status: DISCONTINUED | OUTPATIENT
Start: 2024-05-08 | End: 2024-05-11 | Stop reason: HOSPADM

## 2024-05-08 RX ORDER — ONDANSETRON 2 MG/ML
4 INJECTION INTRAMUSCULAR; INTRAVENOUS EVERY 6 HOURS PRN
Status: DISCONTINUED | OUTPATIENT
Start: 2024-05-08 | End: 2024-05-11 | Stop reason: HOSPADM

## 2024-05-08 RX ORDER — SODIUM CHLORIDE 9 MG/ML
INJECTION, SOLUTION INTRAVENOUS PRN
Status: DISCONTINUED | OUTPATIENT
Start: 2024-05-08 | End: 2024-05-11 | Stop reason: HOSPADM

## 2024-05-08 RX ORDER — PANTOPRAZOLE SODIUM 40 MG/1
40 TABLET, DELAYED RELEASE ORAL
Status: DISCONTINUED | OUTPATIENT
Start: 2024-05-09 | End: 2024-05-11 | Stop reason: HOSPADM

## 2024-05-08 RX ORDER — SODIUM CHLORIDE 0.9 % (FLUSH) 0.9 %
5-40 SYRINGE (ML) INJECTION PRN
Status: DISCONTINUED | OUTPATIENT
Start: 2024-05-08 | End: 2024-05-11 | Stop reason: HOSPADM

## 2024-05-08 RX ORDER — BUDESONIDE 0.5 MG/2ML
0.5 INHALANT ORAL
Status: DISCONTINUED | OUTPATIENT
Start: 2024-05-08 | End: 2024-05-11 | Stop reason: HOSPADM

## 2024-05-08 RX ORDER — BUMETANIDE 1 MG/1
2 TABLET ORAL DAILY
Status: DISCONTINUED | OUTPATIENT
Start: 2024-05-09 | End: 2024-05-11 | Stop reason: HOSPADM

## 2024-05-08 RX ORDER — INSULIN GLARGINE 100 [IU]/ML
10 INJECTION, SOLUTION SUBCUTANEOUS NIGHTLY
Status: DISCONTINUED | OUTPATIENT
Start: 2024-05-08 | End: 2024-05-11 | Stop reason: HOSPADM

## 2024-05-08 RX ORDER — INSULIN LISPRO 100 [IU]/ML
3 INJECTION, SOLUTION INTRAVENOUS; SUBCUTANEOUS
Status: DISCONTINUED | OUTPATIENT
Start: 2024-05-09 | End: 2024-05-11 | Stop reason: HOSPADM

## 2024-05-08 RX ORDER — ACETAMINOPHEN 650 MG/1
650 SUPPOSITORY RECTAL EVERY 6 HOURS PRN
Status: DISCONTINUED | OUTPATIENT
Start: 2024-05-08 | End: 2024-05-11 | Stop reason: HOSPADM

## 2024-05-08 RX ORDER — ASPIRIN 81 MG/1
81 TABLET, CHEWABLE ORAL DAILY
Status: DISCONTINUED | OUTPATIENT
Start: 2024-05-09 | End: 2024-05-11 | Stop reason: HOSPADM

## 2024-05-08 RX ORDER — FAMOTIDINE 20 MG/1
20 TABLET, FILM COATED ORAL DAILY
Status: DISCONTINUED | OUTPATIENT
Start: 2024-05-09 | End: 2024-05-11 | Stop reason: HOSPADM

## 2024-05-08 RX ORDER — DOCUSATE SODIUM 100 MG/1
200 CAPSULE, LIQUID FILLED ORAL NIGHTLY
Status: DISCONTINUED | OUTPATIENT
Start: 2024-05-08 | End: 2024-05-11 | Stop reason: HOSPADM

## 2024-05-08 RX ORDER — GABAPENTIN 100 MG/1
100 CAPSULE ORAL 2 TIMES DAILY
Status: DISCONTINUED | OUTPATIENT
Start: 2024-05-08 | End: 2024-05-11 | Stop reason: HOSPADM

## 2024-05-08 RX ORDER — ONDANSETRON 4 MG/1
4 TABLET, ORALLY DISINTEGRATING ORAL EVERY 8 HOURS PRN
Status: DISCONTINUED | OUTPATIENT
Start: 2024-05-08 | End: 2024-05-11 | Stop reason: HOSPADM

## 2024-05-08 RX ORDER — TRAZODONE HYDROCHLORIDE 50 MG/1
150 TABLET ORAL NIGHTLY
Status: DISCONTINUED | OUTPATIENT
Start: 2024-05-08 | End: 2024-05-11 | Stop reason: HOSPADM

## 2024-05-08 RX ORDER — CARVEDILOL 6.25 MG/1
6.25 TABLET ORAL 2 TIMES DAILY WITH MEALS
Qty: 60 TABLET | Refills: 11 | Status: SHIPPED | OUTPATIENT
Start: 2024-05-08

## 2024-05-08 RX ORDER — SODIUM CHLORIDE 0.9 % (FLUSH) 0.9 %
5-40 SYRINGE (ML) INJECTION EVERY 12 HOURS SCHEDULED
Status: DISCONTINUED | OUTPATIENT
Start: 2024-05-08 | End: 2024-05-11 | Stop reason: HOSPADM

## 2024-05-08 RX ORDER — DEXTROSE MONOHYDRATE 100 MG/ML
INJECTION, SOLUTION INTRAVENOUS CONTINUOUS PRN
Status: DISCONTINUED | OUTPATIENT
Start: 2024-05-08 | End: 2024-05-11 | Stop reason: HOSPADM

## 2024-05-08 RX ORDER — ISOSORBIDE MONONITRATE 30 MG/1
60 TABLET, EXTENDED RELEASE ORAL DAILY
Status: DISCONTINUED | OUTPATIENT
Start: 2024-05-09 | End: 2024-05-11 | Stop reason: HOSPADM

## 2024-05-08 RX ORDER — CARVEDILOL 6.25 MG/1
6.25 TABLET ORAL 2 TIMES DAILY WITH MEALS
Status: DISCONTINUED | OUTPATIENT
Start: 2024-05-09 | End: 2024-05-11 | Stop reason: HOSPADM

## 2024-05-08 RX ORDER — GLUCAGON 1 MG/ML
1 KIT INJECTION PRN
Status: DISCONTINUED | OUTPATIENT
Start: 2024-05-08 | End: 2024-05-11 | Stop reason: HOSPADM

## 2024-05-08 RX ORDER — MONTELUKAST SODIUM 10 MG/1
10 TABLET ORAL NIGHTLY
Status: DISCONTINUED | OUTPATIENT
Start: 2024-05-08 | End: 2024-05-08

## 2024-05-08 RX ORDER — MINOXIDIL 2.5 MG/1
2.5 TABLET ORAL 2 TIMES DAILY
Status: DISCONTINUED | OUTPATIENT
Start: 2024-05-08 | End: 2024-05-11 | Stop reason: HOSPADM

## 2024-05-08 RX ORDER — POLYETHYLENE GLYCOL 3350 17 G/17G
17 POWDER, FOR SOLUTION ORAL DAILY PRN
Status: DISCONTINUED | OUTPATIENT
Start: 2024-05-08 | End: 2024-05-11 | Stop reason: HOSPADM

## 2024-05-08 RX ORDER — CARVEDILOL 6.25 MG/1
6.25 TABLET ORAL 2 TIMES DAILY WITH MEALS
COMMUNITY
End: 2024-05-08 | Stop reason: SDUPTHER

## 2024-05-08 RX ORDER — ENOXAPARIN SODIUM 100 MG/ML
30 INJECTION SUBCUTANEOUS DAILY
Status: DISCONTINUED | OUTPATIENT
Start: 2024-05-09 | End: 2024-05-11 | Stop reason: HOSPADM

## 2024-05-08 RX ORDER — NITROGLYCERIN 0.4 MG/1
0.4 TABLET SUBLINGUAL EVERY 5 MIN PRN
Status: DISCONTINUED | OUTPATIENT
Start: 2024-05-08 | End: 2024-05-11 | Stop reason: HOSPADM

## 2024-05-08 RX ORDER — FLUTICASONE PROPIONATE 50 MCG
2 SPRAY, SUSPENSION (ML) NASAL DAILY
Status: DISCONTINUED | OUTPATIENT
Start: 2024-05-09 | End: 2024-05-11 | Stop reason: HOSPADM

## 2024-05-08 RX ORDER — DULOXETIN HYDROCHLORIDE 30 MG/1
60 CAPSULE, DELAYED RELEASE ORAL DAILY
Status: DISCONTINUED | OUTPATIENT
Start: 2024-05-09 | End: 2024-05-11 | Stop reason: HOSPADM

## 2024-05-08 RX ORDER — HYDRALAZINE HYDROCHLORIDE 50 MG/1
50 TABLET, FILM COATED ORAL 3 TIMES DAILY
Status: DISCONTINUED | OUTPATIENT
Start: 2024-05-08 | End: 2024-05-11 | Stop reason: HOSPADM

## 2024-05-08 RX ORDER — ARFORMOTEROL TARTRATE 15 UG/2ML
15 SOLUTION RESPIRATORY (INHALATION)
Status: DISCONTINUED | OUTPATIENT
Start: 2024-05-08 | End: 2024-05-11 | Stop reason: HOSPADM

## 2024-05-08 RX ADMIN — SODIUM CHLORIDE, PRESERVATIVE FREE 10 ML: 5 INJECTION INTRAVENOUS at 23:00

## 2024-05-08 RX ADMIN — MINOXIDIL 2.5 MG: 2.5 TABLET ORAL at 22:55

## 2024-05-08 RX ADMIN — INSULIN GLARGINE 10 UNITS: 100 INJECTION, SOLUTION SUBCUTANEOUS at 22:56

## 2024-05-08 RX ADMIN — DOCUSATE SODIUM 200 MG: 100 CAPSULE, LIQUID FILLED ORAL at 22:55

## 2024-05-08 RX ADMIN — TRAZODONE HYDROCHLORIDE 150 MG: 50 TABLET ORAL at 22:54

## 2024-05-08 RX ADMIN — SACUBITRIL AND VALSARTAN 1 TABLET: 97; 103 TABLET, FILM COATED ORAL at 22:55

## 2024-05-08 RX ADMIN — GABAPENTIN 100 MG: 100 CAPSULE ORAL at 22:55

## 2024-05-08 RX ADMIN — ATORVASTATIN CALCIUM 10 MG: 20 TABLET, FILM COATED ORAL at 22:55

## 2024-05-08 RX ADMIN — HYDRALAZINE HYDROCHLORIDE 50 MG: 50 TABLET ORAL at 22:54

## 2024-05-08 NOTE — ED PROVIDER NOTES
0.02 0.00 - 0.20 k/uL    Immature Granulocytes Absolute <0.03 0.00 - 0.58 k/uL   BMP   Result Value Ref Range    Sodium 140 132 - 146 mmol/L    Potassium 4.3 3.5 - 5.0 mmol/L    Chloride 106 98 - 107 mmol/L    CO2 25 22 - 29 mmol/L    Anion Gap 9 7 - 16 mmol/L    Glucose 146 (H) 74 - 99 mg/dL    BUN 28 (H) 6 - 23 mg/dL    Creatinine 1.8 (H) 0.50 - 1.00 mg/dL    Est, Glom Filt Rate 30 (L) >60 mL/min/1.73m2    Calcium 9.0 8.6 - 10.2 mg/dL   Magnesium   Result Value Ref Range    Magnesium 2.0 1.6 - 2.6 mg/dL   Protime-INR   Result Value Ref Range    Protime 12.0 9.3 - 12.4 sec    INR 1.1    Troponin   Result Value Ref Range    Troponin, High Sensitivity 17 (H) 0 - 9 ng/L   POCT Glucose   Result Value Ref Range    POC Glucose 107 (H) 74 - 99 mg/dL   EKG 12 Lead   Result Value Ref Range    Ventricular Rate 89 BPM    Atrial Rate 89 BPM    P-R Interval 202 ms    QRS Duration 128 ms    Q-T Interval 398 ms    QTc Calculation (Bazett) 484 ms    P Axis 72 degrees    R Axis 41 degrees    T Axis 51 degrees       RADIOLOGY:  Interpreted by Radiologist.  No orders to display       EKG:  This EKG is signed and interpreted by the EP.    Time: 1743  Rate: 90  Rhythm: Sinus  Interpretation: non-specific EKG  Comparison: None      ------------------------- NURSING NOTES AND VITALS REVIEWED ---------------------------   The nursing notes within the ED encounter and vital signs as below have been reviewed by myself.  BP (!) 200/84   Pulse 80   Temp 98.6 °F (37 °C) (Oral)   Resp 18   LMP 01/01/1990   SpO2 97%   Oxygen Saturation Interpretation: Normal    The patient’s available past medical records and past encounters were reviewed.        ------------------------------ ED COURSE/MEDICAL DECISION MAKING----------------------  Medications   aspirin chewable tablet 81 mg (has no administration in time range)   benzonatate (TESSALON) capsule 100 mg (has no administration in time range)   bumetanide (BUMEX) tablet 2 mg (has no  telemetry  Patient condition is stable    NOTE: This report was transcribed using voice recognition software. Every effort was made to ensure accuracy; however, inadvertent computerized transcription errors may be present        Moustapha Saldana MD  05/08/24 [id number]

## 2024-05-08 NOTE — TELEPHONE ENCOUNTER
She has to stop clonidine and decrease Coreg to 6.25 mg po twice a day.  She does have significant AV block predominantly while sleeping secondary to combination of untreated sleep apnea and clonidine and beta-blocker use.  I still recommend her to go to the hospital.  If she can use CPAP or BiPAP and it can cure her AV block.

## 2024-05-08 NOTE — TELEPHONE ENCOUNTER
Received note and Zio monitor from PCP.  Patient was advised yesterday by our office and PCP to proceed to ER.  Patient did not go to ER.

## 2024-05-08 NOTE — TELEPHONE ENCOUNTER
This was already addressed, meds modified and advised the patient to head to ER. She was strongly encouraged to use C-pap.

## 2024-05-08 NOTE — TELEPHONE ENCOUNTER
Patient and her POA (Rachel) notified of monitor results and Dr. Boateng's recommendations.  Med list amended.  Coreg e-scribed.  Patient states she will comply and go to the ER.

## 2024-05-09 ENCOUNTER — APPOINTMENT (OUTPATIENT)
Age: 69
End: 2024-05-09
Payer: MEDICARE

## 2024-05-09 LAB
ANION GAP SERPL CALCULATED.3IONS-SCNC: 16 MMOL/L (ref 7–16)
BUN SERPL-MCNC: 25 MG/DL (ref 6–23)
CALCIUM SERPL-MCNC: 8.9 MG/DL (ref 8.6–10.2)
CHLORIDE SERPL-SCNC: 106 MMOL/L (ref 98–107)
CO2 SERPL-SCNC: 20 MMOL/L (ref 22–29)
CREAT SERPL-MCNC: 1.6 MG/DL (ref 0.5–1)
ECHO BSA: 1.88 M2
ECHO LV EDV A2C: 104 ML
ECHO LV EDV A4C: 128 ML
ECHO LV EDV BP: 115 ML (ref 56–104)
ECHO LV EDV INDEX A4C: 70 ML/M2
ECHO LV EDV INDEX BP: 63 ML/M2
ECHO LV EDV NDEX A2C: 57 ML/M2
ECHO LV EF PHYSICIAN: 55 %
ECHO LV EJECTION FRACTION A2C: 52 %
ECHO LV EJECTION FRACTION A4C: 58 %
ECHO LV EJECTION FRACTION BIPLANE: 56 % (ref 55–100)
ECHO LV ESV A2C: 50 ML
ECHO LV ESV A4C: 54 ML
ECHO LV ESV BP: 51 ML (ref 19–49)
ECHO LV ESV INDEX A2C: 27 ML/M2
ECHO LV ESV INDEX A4C: 30 ML/M2
ECHO LV ESV INDEX BP: 28 ML/M2
ECHO LV FRACTIONAL SHORTENING: 28 % (ref 28–44)
ECHO LV INTERNAL DIMENSION DIASTOLE INDEX: 2.36 CM/M2
ECHO LV INTERNAL DIMENSION DIASTOLIC: 4.3 CM (ref 3.9–5.3)
ECHO LV INTERNAL DIMENSION SYSTOLIC INDEX: 1.7 CM/M2
ECHO LV INTERNAL DIMENSION SYSTOLIC: 3.1 CM
ECHO LV IVSD: 1.9 CM (ref 0.6–0.9)
ECHO LV MASS 2D: 344.7 G (ref 67–162)
ECHO LV MASS INDEX 2D: 189.4 G/M2 (ref 43–95)
ECHO LV POSTERIOR WALL DIASTOLIC: 1.7 CM (ref 0.6–0.9)
ECHO LV RELATIVE WALL THICKNESS RATIO: 0.79
GFR, ESTIMATED: 35 ML/MIN/1.73M2
GLUCOSE BLD-MCNC: 154 MG/DL (ref 74–99)
GLUCOSE BLD-MCNC: 156 MG/DL (ref 74–99)
GLUCOSE BLD-MCNC: 164 MG/DL (ref 74–99)
GLUCOSE BLD-MCNC: 182 MG/DL (ref 74–99)
GLUCOSE SERPL-MCNC: 203 MG/DL (ref 74–99)
LEFT VENTRICULAR EJECTION FRACTION HIGH VALUE: 55 %
LEFT VENTRICULAR EJECTION FRACTION MODE: NORMAL
LV EF: 55 %
POTASSIUM SERPL-SCNC: 4.3 MMOL/L (ref 3.5–5)
SODIUM SERPL-SCNC: 142 MMOL/L (ref 132–146)

## 2024-05-09 PROCEDURE — 2700000000 HC OXYGEN THERAPY PER DAY

## 2024-05-09 PROCEDURE — 36415 COLL VENOUS BLD VENIPUNCTURE: CPT

## 2024-05-09 PROCEDURE — 6370000000 HC RX 637 (ALT 250 FOR IP): Performed by: STUDENT IN AN ORGANIZED HEALTH CARE EDUCATION/TRAINING PROGRAM

## 2024-05-09 PROCEDURE — G0378 HOSPITAL OBSERVATION PER HR: HCPCS

## 2024-05-09 PROCEDURE — 82962 GLUCOSE BLOOD TEST: CPT

## 2024-05-09 PROCEDURE — 93308 TTE F-UP OR LMTD: CPT | Performed by: INTERNAL MEDICINE

## 2024-05-09 PROCEDURE — 6360000002 HC RX W HCPCS: Performed by: INTERNAL MEDICINE

## 2024-05-09 PROCEDURE — 99232 SBSQ HOSP IP/OBS MODERATE 35: CPT | Performed by: INTERNAL MEDICINE

## 2024-05-09 PROCEDURE — 6370000000 HC RX 637 (ALT 250 FOR IP): Performed by: INTERNAL MEDICINE

## 2024-05-09 PROCEDURE — 80048 BASIC METABOLIC PNL TOTAL CA: CPT

## 2024-05-09 PROCEDURE — 2580000003 HC RX 258: Performed by: INTERNAL MEDICINE

## 2024-05-09 PROCEDURE — 94660 CPAP INITIATION&MGMT: CPT

## 2024-05-09 PROCEDURE — 94640 AIRWAY INHALATION TREATMENT: CPT

## 2024-05-09 PROCEDURE — 93308 TTE F-UP OR LMTD: CPT

## 2024-05-09 RX ORDER — TIZANIDINE 4 MG/1
2 TABLET ORAL EVERY 8 HOURS PRN
Status: DISCONTINUED | OUTPATIENT
Start: 2024-05-09 | End: 2024-05-11 | Stop reason: HOSPADM

## 2024-05-09 RX ORDER — HYDRALAZINE HYDROCHLORIDE 25 MG/1
25 TABLET, FILM COATED ORAL ONCE
Status: COMPLETED | OUTPATIENT
Start: 2024-05-09 | End: 2024-05-09

## 2024-05-09 RX ADMIN — FAMOTIDINE 20 MG: 20 TABLET, FILM COATED ORAL at 10:05

## 2024-05-09 RX ADMIN — TIZANIDINE 2 MG: 4 TABLET ORAL at 15:44

## 2024-05-09 RX ADMIN — GABAPENTIN 100 MG: 100 CAPSULE ORAL at 20:53

## 2024-05-09 RX ADMIN — ACETAMINOPHEN 650 MG: 325 TABLET ORAL at 20:58

## 2024-05-09 RX ADMIN — BUDESONIDE INHALATION 500 MCG: 0.5 SUSPENSION RESPIRATORY (INHALATION) at 00:54

## 2024-05-09 RX ADMIN — HYDRALAZINE HYDROCHLORIDE 50 MG: 50 TABLET ORAL at 20:53

## 2024-05-09 RX ADMIN — MINOXIDIL 2.5 MG: 2.5 TABLET ORAL at 20:55

## 2024-05-09 RX ADMIN — BUDESONIDE INHALATION 500 MCG: 0.5 SUSPENSION RESPIRATORY (INHALATION) at 19:50

## 2024-05-09 RX ADMIN — ACETAMINOPHEN 650 MG: 325 TABLET ORAL at 00:38

## 2024-05-09 RX ADMIN — BUDESONIDE INHALATION 500 MCG: 0.5 SUSPENSION RESPIRATORY (INHALATION) at 07:58

## 2024-05-09 RX ADMIN — ISOSORBIDE MONONITRATE 60 MG: 30 TABLET, EXTENDED RELEASE ORAL at 10:06

## 2024-05-09 RX ADMIN — CARVEDILOL 6.25 MG: 6.25 TABLET, FILM COATED ORAL at 18:36

## 2024-05-09 RX ADMIN — FLUTICASONE PROPIONATE 2 SPRAY: 50 SPRAY, METERED NASAL at 10:06

## 2024-05-09 RX ADMIN — HYDRALAZINE HYDROCHLORIDE 50 MG: 50 TABLET ORAL at 10:05

## 2024-05-09 RX ADMIN — ACETAMINOPHEN 650 MG: 325 TABLET ORAL at 10:05

## 2024-05-09 RX ADMIN — ARFORMOTEROL TARTRATE 15 MCG: 15 SOLUTION RESPIRATORY (INHALATION) at 19:50

## 2024-05-09 RX ADMIN — ARFORMOTEROL TARTRATE 15 MCG: 15 SOLUTION RESPIRATORY (INHALATION) at 07:58

## 2024-05-09 RX ADMIN — INSULIN GLARGINE 10 UNITS: 100 INJECTION, SOLUTION SUBCUTANEOUS at 20:52

## 2024-05-09 RX ADMIN — BUMETANIDE 2 MG: 1 TABLET ORAL at 10:05

## 2024-05-09 RX ADMIN — MINOXIDIL 2.5 MG: 2.5 TABLET ORAL at 10:05

## 2024-05-09 RX ADMIN — SODIUM CHLORIDE, PRESERVATIVE FREE 10 ML: 5 INJECTION INTRAVENOUS at 10:07

## 2024-05-09 RX ADMIN — HYDRALAZINE HYDROCHLORIDE 25 MG: 25 TABLET ORAL at 04:43

## 2024-05-09 RX ADMIN — ARFORMOTEROL TARTRATE 15 MCG: 15 SOLUTION RESPIRATORY (INHALATION) at 00:54

## 2024-05-09 RX ADMIN — TRAZODONE HYDROCHLORIDE 150 MG: 50 TABLET ORAL at 20:53

## 2024-05-09 RX ADMIN — ATORVASTATIN CALCIUM 10 MG: 20 TABLET, FILM COATED ORAL at 20:53

## 2024-05-09 RX ADMIN — SACUBITRIL AND VALSARTAN 1 TABLET: 97; 103 TABLET, FILM COATED ORAL at 10:06

## 2024-05-09 RX ADMIN — OXYBUTYNIN CHLORIDE 15 MG: 10 TABLET, EXTENDED RELEASE ORAL at 10:05

## 2024-05-09 RX ADMIN — INSULIN LISPRO 3 UNITS: 100 INJECTION, SOLUTION INTRAVENOUS; SUBCUTANEOUS at 12:00

## 2024-05-09 RX ADMIN — GABAPENTIN 100 MG: 100 CAPSULE ORAL at 10:05

## 2024-05-09 RX ADMIN — SACUBITRIL AND VALSARTAN 1 TABLET: 97; 103 TABLET, FILM COATED ORAL at 20:55

## 2024-05-09 RX ADMIN — HYDRALAZINE HYDROCHLORIDE 50 MG: 50 TABLET ORAL at 15:44

## 2024-05-09 RX ADMIN — INSULIN LISPRO 3 UNITS: 100 INJECTION, SOLUTION INTRAVENOUS; SUBCUTANEOUS at 17:00

## 2024-05-09 RX ADMIN — DULOXETINE HYDROCHLORIDE 60 MG: 30 CAPSULE, DELAYED RELEASE ORAL at 10:05

## 2024-05-09 ASSESSMENT — PAIN DESCRIPTION - DESCRIPTORS
DESCRIPTORS: SORE
DESCRIPTORS: ACHING;SORE;DISCOMFORT

## 2024-05-09 ASSESSMENT — PAIN SCALES - GENERAL
PAINLEVEL_OUTOF10: 4
PAINLEVEL_OUTOF10: 3
PAINLEVEL_OUTOF10: 10

## 2024-05-09 ASSESSMENT — PAIN DESCRIPTION - ORIENTATION
ORIENTATION: RIGHT
ORIENTATION: RIGHT;LEFT;LOWER

## 2024-05-09 ASSESSMENT — PAIN DESCRIPTION - PAIN TYPE: TYPE: CHRONIC PAIN

## 2024-05-09 ASSESSMENT — PAIN DESCRIPTION - LOCATION
LOCATION: LEG
LOCATION: LEG;BACK

## 2024-05-09 NOTE — PROGRESS NOTES
Messaged  for pain medication for patient. States the PRN tylenol is not helping with the pain in her RLE.

## 2024-05-09 NOTE — PROGRESS NOTES
Contacted , patient uses trilogy at home, Need order to use trilogy or something similar while here. Okay to place order.

## 2024-05-09 NOTE — PROGRESS NOTES
05/09/24 1606   NIV Type   NIV Started/Stopped On   Equipment Type v60   Mode AVAPS   Mask Type Full face mask   Mask Size Medium   Settings/Measurements   PIP Observed 14 cm H20   CPAP/EPAP 5 cmH2O   Vt (Set, mL) 300 mL   Vt (Measured) 454 mL   Rate Ordered 12   FiO2  40 %   I Time/ I Time % 1 s   Minute Volume (L/min) 8.2 Liters   Mask Leak (lpm) 54 lpm   Patient's Home Machine No     Patient requested to be placed on her AVAPS at this time so she may get some rest. Patient tolerating fine and is in no distress.

## 2024-05-09 NOTE — CARE COORDINATION
Patient presented to the ED after ZioPatch showed abnormal rhythm during sleep and was sent in by her PCP; currently in observation status. Met with patient at bedside for transition of care planning. Patient reports living in a 1-story home, her grandson, rBit resides with her. There are 3 steps to enter the home, ramped entry, patient ambulates with a cane or walker; requesting a wheelchair at discharge and has no preference on DME company. Patient reports she goes to the CHF clinic in [place]. Uses [facility] pharmacy ([facility]), PCP is DYLAN Blakely at [facility] and active with [facility] care; will need resumption of care orders. Patient reports having a home health aide through [facility], 5 days a week 7 hrs daily. Call made to Valentin Uzhun at [employer] to confirm patient's DME; patient has an NIV, with 2L O2 bled in, only at night; will need new orders if patient remains on continuous O2. Patient pending an echo and EP consult.    Case Management Assessment  Initial Evaluation    Date/Time of Evaluation: 5/9/2024 11:31 AM  Assessment Completed by: KIKI Mitchell    If patient is discharged prior to next notation, then this note serves as note for discharge by case management.    Patient Name: Steph Gonzáles                   YOB: 1955  Diagnosis: Atrioventricular block [I44.30]  Bradycardia [R00.1]                   Date / Time: 5/8/2024  3:29 PM    Patient Admission Status: Observation   Readmission Risk (Low < 19, Mod (19-27), High > 27): Readmission Risk Score: 23.3    Current PCP: Lita Swain APRN - NP  PCP verified by CM? Yes    Chart Reviewed: Yes      History Provided by: Patient  Patient Orientation: Alert and Oriented    Patient Cognition: Alert    Hospitalization in the last 30 days (Readmission):  No    If yes, Readmission Assessment in  Navigator will be completed.    Advance Directives:      Code Status: Full Code   Patient's Primary Decision Maker  is: Legal Next of Kin    Primary Decision Maker: Rachel Garcia - Child - [phone]    Secondary Decision Maker: Fatmata Conrad - Brother/Sister - [phone]    Discharge Planning:    Patient lives with: Other (Comment) (grandson) Type of Home: House  Primary Care Giver: Self  Patient Support Systems include: Children, Family Members   Current Financial resources:    Current community resources:    Current services prior to admission: Oxygen Therapy, Home Care            Current DME:              Type of Home Care services:  Aide Services    ADLS  Prior functional level: Independent in ADLs/IADLs  Current functional level: Independent in ADLs/IADLs    PT AM-PAC:   /24  OT AM-PAC:   /24    Family can provide assistance at DC: Yes  Would you like Case Management to discuss the discharge plan with any other family members/significant others, and if so, who? No  Plans to Return to Present Housing: Yes  Other Identified Issues/Barriers to RETURNING to current housing: n/a  Potential Assistance needed at discharge: Home Care            Potential DME:    Patient expects to discharge to: House  Plan for transportation at discharge:      Financial    Payor: [facility] MEDICARE / Plan: [employer] DUAL COMPLETE / Product Type: *No Product type* /     Does insurance require precert for SNF: Yes    Potential assistance Purchasing Medications: No  Meds-to-Beds request:        [id number] - [place], [place] - [address] -  [phone] - F [phone]  [address] [address]  Phone: [phone] Fax: [phone]    [facility] [place] [address] [phone] - F [phone]  [address] [address]  Phone: [phone] Fax: [phone]      Notes:    Factors facilitating achievement of predicted outcomes: Pleasant    Barriers to discharge: atrioventricular block    Additional Case Management Notes: n/a    The Plan for Transition of Care

## 2024-05-09 NOTE — PROGRESS NOTES
Called  office to ask for patient's trilogy home settings.   AVAPS 1-5  Tital volume 300  PS min 5, max 25  Epap min 4,max 15  Max pressure 40  Auto breath rate  2-4L oxygen

## 2024-05-09 NOTE — PROGRESS NOTES
[facility]  Hospitalist Progress Note      SYNOPSIS: Patient admitted on 2024 for abnormal rhythm on her Zio patch twice.  She was at to come to the emergency room because of abnormal rhythm on Zio patch.  This was recommended for heart palpitations.  Patient was then admitted for further evaluation and treatment.      SUBJECTIVE:    Patient seen and examined  Records reviewed.     Complaining of back pain which started when patient was in the ED  She thinks this is because of the bed in ED        Stable overnight. No other overnight issues reported.   Temp (24hrs), Av.3 °F (36.8 °C), Min:97.6 °F (36.4 °C), Max:98.6 °F (37 °C)    DIET: ADULT DIET; Regular; Low Fat/Low Chol/High Fiber/2 gm Na  CODE: Full Code  No intake or output data in the 24 hours ending 244    OBJECTIVE:    BP (!) 164/84   Pulse 92   Temp 98.6 °F (37 °C) (Oral)   Resp 18   Ht 1.575 m (5' 2\")   Wt 81.2 kg (179 lb)   LMP 1990   SpO2 100%   BMI 32.74 kg/m²     General appearance: No apparent distress, appears stated age and cooperative.  HEENT:  Conjunctivae/corneas clear.   Neck: Supple. No jugular venous distention.   Respiratory: Clear to auscultation bilaterally, normal respiratory effort  Cardiovascular: Regular rate rhythm, normal S1-S2  Abdomen: Soft, nontender, nondistended  Musculoskeletal: No clubbing, cyanosis, no bilateral lower extremity edema. Brisk capillary refill.   Skin:  No rashes  on visible skin  Neurologic: awake, alert and following commands     ASSESSMENT:    Sinus pauses  History of coronary artery disease  Prior history of CHF  Hypertension  Depression       PLAN:    Ask wound care nurse to see because of lower extremity wounds and need for oral Unaboot exchange  Zanaflex for pain  Continue Tylenol  EP to see  For ILR tomorrow      DISPOSITION: Awaiting ILR implant tomorrow    Medications:  REVIEWED DAILY      Infusion Medications    sodium chloride      dextrose       Scheduled

## 2024-05-09 NOTE — HOME CARE
PATIENT IS ACTIVE WITH [facility] FOR SN AND IF STATUS CHANGES TO INPATIENT SHE WILL NEED VIRGIE ORDERS ADDED.     WENDI GILBERT LPN   [facility]

## 2024-05-09 NOTE — ED NOTES
Report called to the floor, placed in transport. Made aware that she is coming from the waiting room.

## 2024-05-09 NOTE — CONSULTS
Technically difficult examination.   Severe concentric left ventricular hypertrophy.   Left ventricular diastolic filling is elevated .   Ejection fraction is visually estimated at 50 to 55%.   Normal right ventricular size and function.   Mild mitral regurgitation is present.   Mild tricuspid regurgitation.    TTE 06/11/2021:  Left ventricle size is normal.   Ejection fraction is visually estimated at 55-60%.   No regional wall motion abnormalities seen.   Severe concentric left ventricular hypertrophy.   Indeterminate diastolic function.   Normal right ventricular size.   Right ventricle global systolic function is mildly reduced . TAPSE 15 mm.   No hemodynamically significant aortic stenosis is present.   Physiologic and/or trace tricuspid regurgitation.   RVSP is 26 mmHg. Normal estimated PA systolic pressure.   No evidence for hemodynamically significant pericardial effusion.    TTE 01/04/2017:   Ejection fraction is visually estimated at 35-45%.   LV diffuse hypokinesis   The left atrium is mildly dilated.   Mild mitral regurgitation   No pericardial effusion.   The aortic valve appears mildly sclerotic.      TTE 04/15/2016:    Normal left ventricle size and systolic function   Ejection fraction is visually estimated at 60-65%.   Severe asymmetric septal hypertrophy ( 2.3 / 1.4 )   Stage I diastolic dysfunction      Cardiac Catheterization 04/20/2015:    CORONARY ANGIOGRAPHY:   The saphenous graft to the obtuse marginal branch is widely patent with no   problem with the body of the graft or proximal distal anastomosis.  The OM   distal site is huge and harbors no significant stenosis.  There is back   filling into the more proximal circumflex.      The native right coronary artery is a moderate-sized vessel with a   moderate-sized PDA.  There is diffuse moderate disease but no high-grade   stenoses are identified.      Injection of the left subclavian in its proximal segment reveals flow into   its more  providers.    Thank you for allowing me to participate in your patient's care.  Please call me if there are any questions or concerns.     García Ocasio MD  Cardiac Electrophysiology  [facility]  [facility] [place]: [facility]  12:07 AM  5/10/2024

## 2024-05-09 NOTE — H&P
[facility] Hospitalist Group History and Physical      CHIEF COMPLAINT: Abnormal rhythm during sleep.    History of Present Illness: This is 68-year-old -American female with past medical history of coronary artery disease status post CABG, peripheral arterial disease, COPD, chronic systolic heart failure, hypertension, hyperlipidemia, diabetes mellitus, came here due to abnormal rhythm found during sleep.  Patient recently developed palpitation for that reason her PCP put Zio patch and which showed abnormal rhythm during sleep.  Patient sees her cardiologist and lung doctor regularly.  Vitals blood pressure 145/80, pulse 88, respirations 16 and temperature 97.92 Fahrenheit.  Her blood work shows creatinine 1.8 BUN 28 and proBNP 1187.  Blood count normal and hemoglobin 11.2.    Informant(s) for H&P: Patient    REVIEW OF SYSTEMS:  A comprehensive review of systems was negative except for: what is in the HPI      PMH:  Past Medical History:   Diagnosis Date    Asthma     Atherosclerosis of native artery of left leg with rest pain (HCC) 11/09/2018    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) 03/06/2024    CAD (coronary artery disease) 2011    Cellulitis and abscess of trunk 04/14/2015    Cerebellar infarct (HCC) 04/03/2019    Remote, rt. cerebellum, head CT scan, 1/9/19    Chronic back pain     Chronic kidney disease     Chronic systolic congestive heart failure (HCC) 10/04/2013    Chronic venous insufficiency 10/11/2017    COPD (chronic obstructive pulmonary disease) (HCC)     COVID-19     Depression     Diabetes mellitus (HCC)     Diabetic neuropathy (HCC)     Diverticulosis     Fatty liver 01/08/2016    per US    Glaucoma, open angle 02/01/2016    Mild-OU    Hepatic encephalopathy (HCC) 02/07/2016    resolved    Hiatal hernia     History of blood transfusion     Hyperlipidemia     Hyperplastic colon polyp     Hypertension     Incontinence     Liver mass      tablet by mouth daily    ProviderMiranda MD   docusate sodium (COLACE) 100 MG capsule Take 2 capsules by mouth at bedtime 3/11/21   ProviderMiranda MD       Allergies:    Latex, Bee venom, Dilaudid [hydromorphone hcl], Dye [iodides], Keflex [cephalexin], Bee pollen, Cetirizine & related, Fentanyl, Lasix [furosemide], Levaquin [levofloxacin in d5w], Lipitor, Lyrica [pregabalin], Morphine, Naproxen, Nefazodone, Norvasc [amlodipine], Oxycodone-acetaminophen, Shellfish-derived products, and Trazodone and nefazodone    Social History:    reports that she has been smoking cigarettes. She started smoking about 49 years ago. She has a 24.9 pack-year smoking history. She has never used smokeless tobacco. She reports that she does not currently use alcohol. She reports current drug use. Drug: Marijuana (Weed).    Family History:   family history includes Asthma in her father; Breast Cancer (age of onset: 74) in her maternal aunt; Cancer in her mother; Colon Cancer in her brother and mother.       PHYSICAL EXAM:  Vitals:  BP (!) 145/80   Pulse 88   Temp 97.9 °F (36.6 °C) (Oral)   Resp 16   LMP 01/01/1990   SpO2 96%     General Appearance: alert and oriented to person, place and time and in no acute distress  Skin: warm and dry  Head: normocephalic and atraumatic  Eyes: pupils equal, round, and reactive to light, extraocular eye movements intact, conjunctivae normal  Neck: neck supple and non tender without mass   Pulmonary/Chest: clear to auscultation bilaterally- no wheezes, rales or rhonchi, normal air movement, no respiratory distress  Cardiovascular: normal rate, normal S1 and S2 and no carotid bruits  Abdomen: soft, non-tender, non-distended, normal bowel sounds, no masses or organomegaly  Extremities: no cyanosis, no clubbing and no edema  Neurologic: no cranial nerve deficit and speech normal        LABS:  Recent Labs     05/08/24  1733      K 4.3      CO2 25   BUN 28*   CREATININE 1.8*

## 2024-05-09 NOTE — PLAN OF CARE
Problem: Chronic Conditions and Co-morbidities  Goal: Patient's chronic conditions and co-morbidity symptoms are monitored and maintained or improved  Outcome: Progressing     Problem: Discharge Planning  Goal: Discharge to home or other facility with appropriate resources  Outcome: Progressing  Flowsheets (Taken 5/8/2024 4631)  Discharge to home or other facility with appropriate resources:   Identify barriers to discharge with patient and caregiver   Identify discharge learning needs (meds, wound care, etc)   Refer to discharge planning if patient needs post-hospital services based on physician order or complex needs related to functional status, cognitive ability or social support system   Arrange for needed discharge resources and transportation as appropriate   Arrange for interpreters to assist at discharge as needed     Problem: Safety - Adult  Goal: Free from fall injury  Outcome: Progressing     Problem: ABCDS Injury Assessment  Goal: Absence of physical injury  Outcome: Progressing     Problem: Pain  Goal: Verbalizes/displays adequate comfort level or baseline comfort level  Outcome: Progressing

## 2024-05-09 NOTE — PROGRESS NOTES
4 Eyes Skin Assessment     NAME:  Steph Gonzáles  YOB: 1955  MEDICAL RECORD NUMBER:  [id number]    The patient is being assessed for  Admission    I agree that at least one RN has performed a thorough Head to Toe Skin Assessment on the patient. ALL assessment sites listed below have been assessed.      Areas assessed by both nurses:    Head, Face, Ears, Shoulders, Back, Chest, Arms, Elbows, Hands, Sacrum. Buttock, Coccyx, Ischium, Legs. Feet and Heels, and Under Medical Devices         Does the Patient have a Wound? Yes wound(s) were present on assessment. LDA wound assessment was Initiated and completed by RN       Jadon Prevention initiated by RN: Yes  Wound Care Orders initiated by RN: No    Pressure Injury (Stage 3,4, Unstageable, DTI, NWPT, and Complex wounds) if present, place Wound referral order by RN under : No    New Ostomies, if present place, Ostomy referral order under : No     Nurse 1 eSignature: Electronically signed by Dallas Melo RN on 5/9/24 at 2:04 AM EDT    **SHARE this note so that the co-signing nurse can place an eSignature**    Nurse 2 eSignature: Electronically signed by Rebeca Aguilera RN on 5/9/24 at 2:27 AM EDT

## 2024-05-09 NOTE — PROGRESS NOTES
Steph Gonzáles was ordered linaclotide (linzess) 290 mcg which is a nonformulary medication.  This medication will need to be supplied by the patient as the pharmacy does not carry this non-formulary medication.    If the medication has not been administered by 1400 on the following day from the time the order was placed, a pharmacist will follow-up with the nurse of the patient to assess the capability of the patient to bring in the medication.    If it is determined that the patient cannot supply the medication and it is not available to be dispensed from the pharmacy, the provider will be notified.

## 2024-05-10 PROBLEM — I44.39 HIGH-GRADE ATRIOVENTRICULAR BLOCK: Status: ACTIVE | Noted: 2024-05-10

## 2024-05-10 LAB
ECHO BSA: 1.88 M2
EKG ATRIAL RATE: 89 BPM
EKG P AXIS: 72 DEGREES
EKG P-R INTERVAL: 202 MS
EKG Q-T INTERVAL: 398 MS
EKG QRS DURATION: 128 MS
EKG QTC CALCULATION (BAZETT): 484 MS
EKG R AXIS: 41 DEGREES
EKG T AXIS: 51 DEGREES
EKG VENTRICULAR RATE: 89 BPM
GLUCOSE BLD-MCNC: 117 MG/DL (ref 74–99)
GLUCOSE BLD-MCNC: 137 MG/DL (ref 74–99)
GLUCOSE BLD-MCNC: 151 MG/DL (ref 74–99)
GLUCOSE BLD-MCNC: 162 MG/DL (ref 74–99)
GLUCOSE BLD-MCNC: 180 MG/DL (ref 74–99)

## 2024-05-10 PROCEDURE — G0378 HOSPITAL OBSERVATION PER HR: HCPCS

## 2024-05-10 PROCEDURE — 82962 GLUCOSE BLOOD TEST: CPT

## 2024-05-10 PROCEDURE — 6370000000 HC RX 637 (ALT 250 FOR IP): Performed by: INTERNAL MEDICINE

## 2024-05-10 PROCEDURE — C1764 EVENT RECORDER, CARDIAC: HCPCS | Performed by: INTERNAL MEDICINE

## 2024-05-10 PROCEDURE — 33285 INSJ SUBQ CAR RHYTHM MNTR: CPT | Performed by: INTERNAL MEDICINE

## 2024-05-10 PROCEDURE — 94640 AIRWAY INHALATION TREATMENT: CPT

## 2024-05-10 PROCEDURE — 94660 CPAP INITIATION&MGMT: CPT

## 2024-05-10 PROCEDURE — 2709999900 HC NON-CHARGEABLE SUPPLY: Performed by: INTERNAL MEDICINE

## 2024-05-10 PROCEDURE — 2700000000 HC OXYGEN THERAPY PER DAY

## 2024-05-10 PROCEDURE — 99232 SBSQ HOSP IP/OBS MODERATE 35: CPT | Performed by: INTERNAL MEDICINE

## 2024-05-10 PROCEDURE — 93010 ELECTROCARDIOGRAM REPORT: CPT | Performed by: INTERNAL MEDICINE

## 2024-05-10 PROCEDURE — 2500000003 HC RX 250 WO HCPCS: Performed by: INTERNAL MEDICINE

## 2024-05-10 PROCEDURE — 6360000002 HC RX W HCPCS: Performed by: INTERNAL MEDICINE

## 2024-05-10 PROCEDURE — 99291 CRITICAL CARE FIRST HOUR: CPT | Performed by: INTERNAL MEDICINE

## 2024-05-10 PROCEDURE — 2580000003 HC RX 258: Performed by: INTERNAL MEDICINE

## 2024-05-10 DEVICE — ICM LNQ22 LINQ II USA
Type: IMPLANTABLE DEVICE | Site: CHEST | Status: FUNCTIONAL
Brand: LINQ II™

## 2024-05-10 RX ADMIN — FAMOTIDINE 20 MG: 20 TABLET, FILM COATED ORAL at 10:19

## 2024-05-10 RX ADMIN — INSULIN LISPRO 3 UNITS: 100 INJECTION, SOLUTION INTRAVENOUS; SUBCUTANEOUS at 12:34

## 2024-05-10 RX ADMIN — ISOSORBIDE MONONITRATE 60 MG: 30 TABLET, EXTENDED RELEASE ORAL at 10:19

## 2024-05-10 RX ADMIN — BUMETANIDE 2 MG: 1 TABLET ORAL at 10:19

## 2024-05-10 RX ADMIN — HYDRALAZINE HYDROCHLORIDE 50 MG: 50 TABLET ORAL at 21:46

## 2024-05-10 RX ADMIN — HYDRALAZINE HYDROCHLORIDE 50 MG: 50 TABLET ORAL at 12:34

## 2024-05-10 RX ADMIN — SACUBITRIL AND VALSARTAN 1 TABLET: 97; 103 TABLET, FILM COATED ORAL at 10:28

## 2024-05-10 RX ADMIN — TIZANIDINE 2 MG: 4 TABLET ORAL at 10:18

## 2024-05-10 RX ADMIN — ACETAMINOPHEN 650 MG: 325 TABLET ORAL at 23:47

## 2024-05-10 RX ADMIN — TIZANIDINE 2 MG: 4 TABLET ORAL at 01:44

## 2024-05-10 RX ADMIN — DOCUSATE SODIUM 200 MG: 100 CAPSULE, LIQUID FILLED ORAL at 21:47

## 2024-05-10 RX ADMIN — INSULIN LISPRO 3 UNITS: 100 INJECTION, SOLUTION INTRAVENOUS; SUBCUTANEOUS at 18:21

## 2024-05-10 RX ADMIN — SODIUM CHLORIDE, PRESERVATIVE FREE 10 ML: 5 INJECTION INTRAVENOUS at 21:47

## 2024-05-10 RX ADMIN — FLUTICASONE PROPIONATE 2 SPRAY: 50 SPRAY, METERED NASAL at 10:27

## 2024-05-10 RX ADMIN — BUDESONIDE INHALATION 500 MCG: 0.5 SUSPENSION RESPIRATORY (INHALATION) at 20:03

## 2024-05-10 RX ADMIN — PANTOPRAZOLE SODIUM 40 MG: 40 TABLET, DELAYED RELEASE ORAL at 10:27

## 2024-05-10 RX ADMIN — INSULIN GLARGINE 10 UNITS: 100 INJECTION, SOLUTION SUBCUTANEOUS at 21:51

## 2024-05-10 RX ADMIN — GABAPENTIN 100 MG: 100 CAPSULE ORAL at 21:47

## 2024-05-10 RX ADMIN — SACUBITRIL AND VALSARTAN 1 TABLET: 97; 103 TABLET, FILM COATED ORAL at 21:46

## 2024-05-10 RX ADMIN — TIZANIDINE 2 MG: 4 TABLET ORAL at 18:20

## 2024-05-10 RX ADMIN — MINOXIDIL 2.5 MG: 2.5 TABLET ORAL at 21:47

## 2024-05-10 RX ADMIN — ARFORMOTEROL TARTRATE 15 MCG: 15 SOLUTION RESPIRATORY (INHALATION) at 20:04

## 2024-05-10 RX ADMIN — HYDRALAZINE HYDROCHLORIDE 50 MG: 50 TABLET ORAL at 10:21

## 2024-05-10 RX ADMIN — ARFORMOTEROL TARTRATE 15 MCG: 15 SOLUTION RESPIRATORY (INHALATION) at 08:11

## 2024-05-10 RX ADMIN — ATORVASTATIN CALCIUM 10 MG: 20 TABLET, FILM COATED ORAL at 21:47

## 2024-05-10 RX ADMIN — DULOXETINE HYDROCHLORIDE 60 MG: 30 CAPSULE, DELAYED RELEASE ORAL at 10:21

## 2024-05-10 RX ADMIN — MINOXIDIL 2.5 MG: 2.5 TABLET ORAL at 10:27

## 2024-05-10 RX ADMIN — TRAZODONE HYDROCHLORIDE 150 MG: 50 TABLET ORAL at 21:46

## 2024-05-10 RX ADMIN — SODIUM CHLORIDE, PRESERVATIVE FREE 10 ML: 5 INJECTION INTRAVENOUS at 10:28

## 2024-05-10 RX ADMIN — CARVEDILOL 6.25 MG: 6.25 TABLET, FILM COATED ORAL at 10:21

## 2024-05-10 RX ADMIN — OXYBUTYNIN CHLORIDE 15 MG: 10 TABLET, EXTENDED RELEASE ORAL at 10:27

## 2024-05-10 RX ADMIN — BUDESONIDE INHALATION 500 MCG: 0.5 SUSPENSION RESPIRATORY (INHALATION) at 08:11

## 2024-05-10 RX ADMIN — GABAPENTIN 100 MG: 100 CAPSULE ORAL at 10:19

## 2024-05-10 RX ADMIN — CARVEDILOL 6.25 MG: 6.25 TABLET, FILM COATED ORAL at 18:20

## 2024-05-10 ASSESSMENT — PAIN - FUNCTIONAL ASSESSMENT
PAIN_FUNCTIONAL_ASSESSMENT: ACTIVITIES ARE NOT PREVENTED

## 2024-05-10 ASSESSMENT — PAIN SCALES - GENERAL
PAINLEVEL_OUTOF10: 3
PAINLEVEL_OUTOF10: 6
PAINLEVEL_OUTOF10: 3
PAINLEVEL_OUTOF10: 0
PAINLEVEL_OUTOF10: 3

## 2024-05-10 ASSESSMENT — PAIN SCALES - WONG BAKER: WONGBAKER_NUMERICALRESPONSE: NO HURT

## 2024-05-10 ASSESSMENT — PAIN DESCRIPTION - LOCATION
LOCATION: LEG
LOCATION: BACK
LOCATION: BACK

## 2024-05-10 ASSESSMENT — PAIN DESCRIPTION - DESCRIPTORS
DESCRIPTORS: DISCOMFORT
DESCRIPTORS: SPASM
DESCRIPTORS: ACHING;DISCOMFORT;SORE

## 2024-05-10 ASSESSMENT — PAIN DESCRIPTION - ORIENTATION: ORIENTATION: RIGHT

## 2024-05-10 ASSESSMENT — PAIN DESCRIPTION - ONSET: ONSET: GRADUAL

## 2024-05-10 ASSESSMENT — PAIN DESCRIPTION - PAIN TYPE: TYPE: ACUTE PAIN

## 2024-05-10 NOTE — FLOWSHEET NOTE
Inpatient Wound Care (Initial consult) 6408B    Admit Date: 5/8/2024  3:29 PM    Reason for consult:  Right lower leg    Significant history:  Per H&P    CHIEF COMPLAINT: Abnormal rhythm during sleep.     History of Present Illness: This is 68-year-old -American female with past medical history of coronary artery disease status post CABG, peripheral arterial disease, COPD, chronic systolic heart failure, hypertension, hyperlipidemia, diabetes mellitus, came here due to abnormal rhythm found during sleep.  Patient recently developed palpitation for that reason her PCP put Zio patch and which showed abnormal rhythm during sleep.  Patient sees her cardiologist and lung doctor regularly.  Vitals blood pressure 145/80, pulse 88, respirations 16 and temperature 97.92 Fahrenheit.  Her blood work shows creatinine 1.8 BUN 28 and proBNP 1187.  Blood count normal and hemoglobin 11.2.    Findings:     05/10/24 1108   Skin Integumentary    Skin Integrity Excoriation   Location abdominal fold   Skin Integrity Site 2   Skin Integrity Location 2 Rash;Redness   Location 2 groin   Wound 04/01/24 Leg Right;Lower #1   Date First Assessed/Time First Assessed: 04/01/24 1003   Wound Approximate Age at First Assessment (Weeks): 12 weeks  Primary Wound Type: Venous Ulcer  Location: Leg  Wound Location Orientation: Right;Lower  Wound Description (Comments): #1   Wound Image    Wound Etiology Venous   Dressing Status New dressing applied   Wound Cleansed Cleansed with saline   Dressing/Treatment Alginate;Dry dressing;Ace wrap   Wound Length (cm) 1.9 cm   Wound Width (cm) 2.2 cm   Wound Depth (cm) 0.1 cm   Wound Surface Area (cm^2) 4.18 cm^2   Change in Wound Size % (l*w) -85.78   Wound Volume (cm^3) 0.418 cm^3   Wound Healing % 7   Wound Assessment Pink/red  (yellow)   Drainage Amount Small (< 25%)   Drainage Description Serosanguinous;Yellow   Odor None   Kely-wound Assessment Dry/flaky       **Informed Consent**    The patient has  given verbal consent to have photos taken of wound and inserted into their chart as part of their permanent medical record for purposes of documentation, treatment management and/or medical review.   All Images taken on 5/10/24 of patient name: Steph Gonzáles were transmitted and stored on secured Epic  Site located within Media Folder Tab by a registered Epic-Ha[employer] Mobile Application Device.        Impression:  Right lower leg; Venous    Plan: Opticell, 4x4, ace wrap  Antifungal powder  Patient will need continued preventative care      Merced Larry RN 5/10/2024 11:10 AM

## 2024-05-10 NOTE — PROGRESS NOTES
05/09/24 2116   NIV Type   NIV Started/Stopped On   Equipment Type v60   Mode AVAPS   Mask Type Full face mask   Mask Size Medium   Settings/Measurements   PIP Observed 13 cm H20   CPAP/EPAP 5 cmH2O   Vt (Set, mL) 300 mL   Vt (Measured) 545 mL   Rate Ordered 12   FiO2  40 %   I Time/ I Time % 1 s   Minute Volume (L/min) 8.9 Liters   Mask Leak (lpm) 45 lpm   Patient's Home Machine No

## 2024-05-10 NOTE — PROGRESS NOTES
[facility]  Hospitalist Progress Note      SYNOPSIS: Patient admitted on 2024 for abnormal rhythm on her Zio patch twice.  She was at to come to the emergency room because of abnormal rhythm on Zio patch.  This was recommended for heart palpitations.  Patient was then admitted for further evaluation and treatment.      SUBJECTIVE:    Patient seen and examined  Records reviewed.     Doing ok  Wants regular diet        Stable overnight. No other overnight issues reported.   Temp (24hrs), Av °F (36.7 °C), Min:97.4 °F (36.3 °C), Max:98.6 °F (37 °C)    DIET: ADULT ORAL NUTRITION SUPPLEMENT; Lunch, Dinner; Wound Healing Oral Supplement  ADULT DIET; Regular; Low Fat/Low Chol/High Fiber/2 gm Na  CODE: Full Code  No intake or output data in the 24 hours ending 05/10/24 1218    OBJECTIVE:    /71   Pulse 85   Temp 97.4 °F (36.3 °C) (Oral)   Resp 19   Ht 1.575 m (5' 2\")   Wt 81.2 kg (179 lb)   LMP 1990   SpO2 97%   BMI 32.74 kg/m²     General appearance: No apparent distress, appears stated age and cooperative.  HEENT:  Conjunctivae/corneas clear.   Neck: Supple. No jugular venous distention.   Respiratory: Clear to auscultation bilaterally, normal respiratory effort  Cardiovascular: Regular rate rhythm, normal S1-S2  Abdomen: Soft, nontender, nondistended  Musculoskeletal: No clubbing, cyanosis, no bilateral lower extremity edema. Brisk capillary refill.   Skin:  No rashes  on visible skin  Neurologic: awake, alert and following commands     ASSESSMENT:    Sinus pauses  History of coronary artery disease  Prior history of CHF  Hypertension  Depression       PLAN:    Discussed with nursing and wound care  Zanaflex for pain  Continue Tylenol  EP to see  For ILR today      DISPOSITION: Awaiting ILR implant today    Medications:  REVIEWED DAILY      Infusion Medications    sodium chloride      dextrose       Scheduled Medications    miconazole   Topical BID    aspirin  81 mg Oral Daily

## 2024-05-10 NOTE — DISCHARGE INSTRUCTIONS
[facility]  Implantable Cardiac Monitor Discharge Instructions For Patients      Medications:  Resume all prescribed medications.    Follow-Up: You will be seen on Friday 5/24/24 at 9:30 am at the [facility] for an incision and device check.  Please call the office if you need to reschedule this appointment. The number is [phone]     Incision Care:  Leave the brown AquaCel dressing on for 7 days.  Remove AquaCel dressing on Friday 5/17/24, but do not remove the Steri-Strips from your incision. They will fall off on their own, or they will be removed at your follow-up appointment.   You may shower starting tomorrow, but do not let the water run directly on the incision  for 7 days.   Check the area daily. If you find any redness, swelling, drainage, warmth, or have a fever greater than 100 degrees, notify the office immediately at the number listed below.     Activity: You may continue regular daily activities, but try to prevent any hard blows to the incision site.    Driving: No driving until the day after your procedure.    Special Instructions: Let your dentist, doctor, or medical specialist know that you have an implantable cardiac monitor so precautions can be taken to protect the device. There is no need to take antibiotics prior to dental procedures. Your device is considered to be \"MRI conditional,\" meaning that under certain circumstances, MRI exams may be performed immediately post implantation. Your device may set off a metal detector, such as those used in airports and courtrooms. Let security know you have an implantable cardiac monitor and show them your ID card.    ID Card: You will have a temporary ID card until a permanent card is sent to you by the device company. The permanent card will look like a ’s license or credit card and should arrive within 8 weeks. Carry your ID card with you at all times.     [facility]:

## 2024-05-10 NOTE — CARE COORDINATION
Patient for loop recorder implantation today. Plan is for patient to return home, she is currently independent, ambulates with a walker or cane and her home health aide will transport home when discharged. Referral for wheelchair made to [facility].    Electronically signed by KIKI Mitchell on 5/10/2024 at 1:23 PM

## 2024-05-11 VITALS
HEIGHT: 62 IN | RESPIRATION RATE: 15 BRPM | TEMPERATURE: 98.4 F | OXYGEN SATURATION: 96 % | BODY MASS INDEX: 32.94 KG/M2 | SYSTOLIC BLOOD PRESSURE: 136 MMHG | HEART RATE: 84 BPM | WEIGHT: 179 LBS | DIASTOLIC BLOOD PRESSURE: 73 MMHG

## 2024-05-11 LAB — GLUCOSE BLD-MCNC: 144 MG/DL (ref 74–99)

## 2024-05-11 PROCEDURE — 94660 CPAP INITIATION&MGMT: CPT

## 2024-05-11 PROCEDURE — 99239 HOSP IP/OBS DSCHRG MGMT >30: CPT | Performed by: INTERNAL MEDICINE

## 2024-05-11 PROCEDURE — 6370000000 HC RX 637 (ALT 250 FOR IP): Performed by: INTERNAL MEDICINE

## 2024-05-11 PROCEDURE — 82962 GLUCOSE BLOOD TEST: CPT

## 2024-05-11 PROCEDURE — G0378 HOSPITAL OBSERVATION PER HR: HCPCS

## 2024-05-11 PROCEDURE — 6360000002 HC RX W HCPCS: Performed by: INTERNAL MEDICINE

## 2024-05-11 PROCEDURE — 94640 AIRWAY INHALATION TREATMENT: CPT

## 2024-05-11 RX ORDER — TIZANIDINE 2 MG/1
2 TABLET ORAL EVERY 8 HOURS PRN
Qty: 30 TABLET | Refills: 0 | Status: SHIPPED | OUTPATIENT
Start: 2024-05-11

## 2024-05-11 RX ADMIN — ISOSORBIDE MONONITRATE 60 MG: 30 TABLET, EXTENDED RELEASE ORAL at 10:43

## 2024-05-11 RX ADMIN — HYDRALAZINE HYDROCHLORIDE 50 MG: 50 TABLET ORAL at 10:43

## 2024-05-11 RX ADMIN — SACUBITRIL AND VALSARTAN 1 TABLET: 97; 103 TABLET, FILM COATED ORAL at 10:45

## 2024-05-11 RX ADMIN — FLUTICASONE PROPIONATE 2 SPRAY: 50 SPRAY, METERED NASAL at 10:46

## 2024-05-11 RX ADMIN — BUMETANIDE 2 MG: 1 TABLET ORAL at 10:43

## 2024-05-11 RX ADMIN — ARFORMOTEROL TARTRATE 15 MCG: 15 SOLUTION RESPIRATORY (INHALATION) at 08:58

## 2024-05-11 RX ADMIN — CARVEDILOL 6.25 MG: 6.25 TABLET, FILM COATED ORAL at 10:54

## 2024-05-11 RX ADMIN — OXYBUTYNIN CHLORIDE 15 MG: 10 TABLET, EXTENDED RELEASE ORAL at 10:46

## 2024-05-11 RX ADMIN — TIZANIDINE 2 MG: 4 TABLET ORAL at 10:49

## 2024-05-11 RX ADMIN — BUDESONIDE INHALATION 500 MCG: 0.5 SUSPENSION RESPIRATORY (INHALATION) at 08:58

## 2024-05-11 RX ADMIN — ASPIRIN 81 MG: 81 TABLET, CHEWABLE ORAL at 10:43

## 2024-05-11 RX ADMIN — FAMOTIDINE 20 MG: 20 TABLET, FILM COATED ORAL at 10:43

## 2024-05-11 RX ADMIN — MINOXIDIL 2.5 MG: 2.5 TABLET ORAL at 10:48

## 2024-05-11 RX ADMIN — DULOXETINE HYDROCHLORIDE 60 MG: 30 CAPSULE, DELAYED RELEASE ORAL at 10:43

## 2024-05-11 RX ADMIN — GABAPENTIN 100 MG: 100 CAPSULE ORAL at 10:50

## 2024-05-11 RX ADMIN — PANTOPRAZOLE SODIUM 40 MG: 40 TABLET, DELAYED RELEASE ORAL at 06:09

## 2024-05-11 ASSESSMENT — PAIN SCALES - GENERAL
PAINLEVEL_OUTOF10: 10
PAINLEVEL_OUTOF10: 10

## 2024-05-11 ASSESSMENT — PAIN DESCRIPTION - DESCRIPTORS: DESCRIPTORS: SPASM

## 2024-05-11 ASSESSMENT — PAIN DESCRIPTION - ORIENTATION: ORIENTATION: RIGHT

## 2024-05-11 ASSESSMENT — PAIN DESCRIPTION - LOCATION: LOCATION: ARM;LEG;OTHER (COMMENT)

## 2024-05-11 NOTE — PLAN OF CARE
Problem: Chronic Conditions and Co-morbidities  Goal: Patient's chronic conditions and co-morbidity symptoms are monitored and maintained or improved  Outcome: Progressing     Problem: Discharge Planning  Goal: Discharge to home or other facility with appropriate resources  5/11/2024 1245 by Hannah Ness RN  Outcome: Progressing  5/11/2024 0014 by Ronel Johnson RN  Outcome: Progressing     Problem: Safety - Adult  Goal: Free from fall injury  5/11/2024 1245 by Hannah Ness RN  Outcome: Progressing  5/11/2024 0014 by Ronel Johnson RN  Outcome: Progressing  Flowsheets (Taken 5/11/2024 0009)  Free From Fall Injury:   Instruct family/caregiver on patient safety   Based on caregiver fall risk screen, instruct family/caregiver to ask for assistance with transferring infant if caregiver noted to have fall risk factors     Problem: ABCDS Injury Assessment  Goal: Absence of physical injury  5/11/2024 1245 by Hannah Ness RN  Outcome: Progressing  5/11/2024 0014 by Ronel Johnson RN  Outcome: Progressing  Flowsheets (Taken 5/11/2024 0009)  Absence of Physical Injury: Implement safety measures based on patient assessment     Problem: Pain  Goal: Verbalizes/displays adequate comfort level or baseline comfort level  5/11/2024 1245 by Hannah Ness RN  Outcome: Progressing  5/11/2024 0014 by Ronel Johnson RN  Outcome: Progressing  Flowsheets  Taken 5/10/2024 2230 by Ronel Johnson RN  Verbalizes/displays adequate comfort level or baseline comfort level:   Encourage patient to monitor pain and request assistance   Assess pain using appropriate pain scale   Administer analgesics based on type and severity of pain and evaluate response   Implement non-pharmacological measures as appropriate and evaluate response   Consider cultural and social influences on pain and pain management  Taken 5/10/2024 1018 by Anali Jamison RN  Verbalizes/displays adequate comfort level or baseline comfort  level: Encourage patient to monitor pain and request assistance

## 2024-05-11 NOTE — DISCHARGE SUMMARY
round, and reactive to light. Conjunctivae/corneas clear.  Neck: Short and thick.  Respiratory: Distant but clear   Left chest dressing is in place  cardiovascular: Regular rate and rhythm with normal S1/S2 without murmurs, rubs or gallops.  Abdomen: Soft, non-tender, non-distended with normal bowel sounds.  Musculoskeletal: No clubbing, cyanosis and negative for edema bilaterally. pulses (dorsalis pedis).      Neurologic: awake, alert and following commands   Left knee postop scar from knee replacement.  She does have muscle wasting of her extremities but she does have a good range of motion of her left upper extremity and her left lower extremity which she had complained about having spasms in.    Consults:     IP CONSULT TO ELECTROPHYSIOLOGY  IP CONSULT TO INTERNAL MEDICINE    Significant Diagnostic Studies:  No results found.     Disposition: Stable for home    Discharge Instructions/Follow-up: Given    Code Status:  Full Code     Activity: activity as tolerated    Diet: cardiac diet and diabetic diet    Labs: For convenience and continuity at follow-up the following most recent labs are provided:      CBC:    Lab Results   Component Value Date/Time    WBC 6.7 05/08/2024 05:33 PM    HGB 11.2 05/08/2024 05:33 PM    HCT 35.3 05/08/2024 05:33 PM     05/08/2024 05:33 PM       Renal:    Lab Results   Component Value Date/Time     05/09/2024 04:36 AM    K 4.3 05/09/2024 04:36 AM    K 4.6 03/14/2023 12:24 PM     05/09/2024 04:36 AM    CO2 20 05/09/2024 04:36 AM    BUN 25 05/09/2024 04:36 AM    CREATININE 1.6 05/09/2024 04:36 AM    CALCIUM 8.9 05/09/2024 04:36 AM    PHOS 3.4 02/09/2024 09:20 AM       Discharge Medications:     Current Discharge Medication List             Details   miconazole (MICOTIN) 2 % powder Apply topically 2 times daily.  Qty: 45 g, Refills: 1      tiZANidine (ZANAFLEX) 2 MG tablet Take 1 tablet by mouth every 8 hours as needed (ms spasm)  Qty: 30 tablet, Refills: 0                 Details   carvedilol (COREG) 6.25 MG tablet Take 1 tablet by mouth 2 times daily (with meals) DOSE DECREASED  Qty: 60 tablet, Refills: 11      !! hydrALAZINE (APRESOLINE) 50 MG tablet Take 1 tablet by mouth 3 times daily Take in addition to 25 mg TID      !! hydrALAZINE (APRESOLINE) 25 MG tablet Take 1 tablet by mouth 3 times daily Take in addition to 50 mg TID  Qty: 90 tablet, Refills: 11      nitroGLYCERIN (NITROSTAT) 0.3 MG SL tablet Place 1 tablet under the tongue every 5 minutes as needed for Chest pain up to max of 3 total doses. If no relief after 1 dose, call 911.      oxyBUTYnin (DITROPAN XL) 15 MG extended release tablet Take 1 tablet by mouth daily      insulin lispro (HUMALOG) 100 UNIT/ML SOLN injection vial Inject into the skin 3 times daily (with meals) Sliding scale      isosorbide mononitrate (IMDUR) 60 MG extended release tablet Take 1 tablet by mouth daily  Qty: 90 tablet, Refills: 3    Comments: DOSE CHANGED TO 60 MG DAILY      minoxidil (LONITEN) 2.5 MG tablet Take 1 tablet by mouth 2 times daily      LINZESS 290 MCG CAPS capsule Take 1 capsule by mouth every morning (before breakfast)      fluticasone (FLONASE) 50 MCG/ACT nasal spray 2 sprays by Nasal route daily 2 sprays each nostril once daily  Qty: 1 each, Refills: 3      Azelastine HCl 137 MCG/SPRAY SOLN USE 2 SPRAYS IN EACH NOSTRIL IN THE MORNING AND AT BEDTIME AS DIRECTED  Qty: 1 each, Refills: 4    Associated Diagnoses: Seasonal allergic rhinitis due to pollen      omeprazole (PRILOSEC OTC) 20 MG tablet Take 2 tablets by mouth daily  Qty: 30 tablet, Refills: 3      sacubitril-valsartan (ENTRESTO)  MG per tablet Take 1 tablet by mouth 2 times daily  Qty: 180 tablet, Refills: 3      simvastatin (ZOCOR) 20 MG tablet Take 1 tablet by mouth nightly  Qty: 90 tablet, Refills: 3      budesonide-formoterol (SYMBICORT) 160-4.5 MCG/ACT AERO Inhale 2 puffs into the lungs 2 times daily      Multiple Vitamins-Minerals (THERAPEUTIC

## 2024-05-11 NOTE — PROGRESS NOTES
Synopsis: Patient admitted on 5/8/2024     68-year-old female with history of heart disease and history of CABG and peripheral arterial disease and COPD and chronic systolic heart failure hypertension hyperlipidemia and obesity.  Developed palpitations.  Her PCP had her on a Zio patch for monitoring and it had showed an abnormal rhythm during her sleep.  She follows with her consultants regularly.  She was brought in and BNP was 1187.  Otherwise nonsignificant workup.  She was eventually seen by electrophysiology and had an implantable loop recorder    During the assessment of her Zio patch she had multiple pauses and majority of the pauses were during sleeping hours and pattern of vagal stimulation suspect related to sleep apnea.  There were some pauses in the waking hours no associated symptoms during napping at that time.  Clonidine was discontinued.  Coreg was reduced without recurrent pauses however an implantable loop recorder was still preferred which she had placed yesterday.  Past Medical History:   has a past medical history of Asthma, Atherosclerosis of native artery of left leg with rest pain ([facility]), Atherosclerosis of native artery of right lower extremity with ulceration of calf ([facility]), CAD (coronary artery disease), Cellulitis and abscess of trunk, Cerebellar infarct ([facility]), Chronic back pain, Chronic kidney disease, Chronic systolic congestive heart failure (HCC), Chronic venous insufficiency, COPD (chronic obstructive pulmonary disease) ([facility]), COVID-19, Depression, Diabetes mellitus (HCC), Diabetic neuropathy (HCC), Diverticulosis, Fatty liver, Glaucoma, open angle, Hepatic encephalopathy ([facility]), Hiatal hernia, History of blood transfusion, Hyperlipidemia, Hyperplastic colon polyp, Hypertension, Incontinence, Liver mass, Lower limb ulcer, calf, right, limited to breakdown of skin (HCC), Lower limb ulcer, calf, right, with fat layer exposed ([facility]), Morbid obesity ([facility]), Movement disorder,

## 2024-05-11 NOTE — PLAN OF CARE
Problem: Discharge Planning  Goal: Discharge to home or other facility with appropriate resources  Outcome: Progressing     Problem: Safety - Adult  Goal: Free from fall injury  Outcome: Progressing  Flowsheets (Taken 5/11/2024 0009)  Free From Fall Injury:   Instruct family/caregiver on patient safety   Based on caregiver fall risk screen, instruct family/caregiver to ask for assistance with transferring infant if caregiver noted to have fall risk factors     Problem: ABCDS Injury Assessment  Goal: Absence of physical injury  Outcome: Progressing  Flowsheets (Taken 5/11/2024 0009)  Absence of Physical Injury: Implement safety measures based on patient assessment     Problem: Pain  Goal: Verbalizes/displays adequate comfort level or baseline comfort level  Outcome: Progressing  Flowsheets  Taken 5/10/2024 2230 by Ronel Johnson RN  Verbalizes/displays adequate comfort level or baseline comfort level:   Encourage patient to monitor pain and request assistance   Assess pain using appropriate pain scale   Administer analgesics based on type and severity of pain and evaluate response   Implement non-pharmacological measures as appropriate and evaluate response   Consider cultural and social influences on pain and pain management  Taken 5/10/2024 1018 by Anali Jamison RN  Verbalizes/displays adequate comfort level or baseline comfort level: Encourage patient to monitor pain and request assistance

## 2024-05-11 NOTE — CARE COORDINATION
5/11/2024Discharge order noted. Dme order noted for w/c. Tracy spoke with [facility]. There is no medical documentation supporting need for w/c,per dme rep. Tracy spoke with pt. Pt requesting motorized w/c. Tracy explained that pt will have to follow up with pcp to initiate paper work for motorized w/c. Pt stated understanding.  Electronically signed by SINDHU Ruvalcaba on 5/11/2024 at 9:27 AM]    [facility] notified of dc today. Will need kaitlyn orders in chart,if not in epic.  Electronically signed by SINDHU Ruvalcaba on 5/11/2024 at 11:12 AM

## 2024-05-14 ENCOUNTER — TELEPHONE (OUTPATIENT)
Dept: CARDIOLOGY CLINIC | Age: 69
End: 2024-05-14

## 2024-05-15 ENCOUNTER — HOSPITAL ENCOUNTER (OUTPATIENT)
Dept: WOUND CARE | Age: 69
Discharge: HOME | End: 2024-05-15
Payer: MEDICARE

## 2024-05-15 VITALS
RESPIRATION RATE: 18 BRPM | HEART RATE: 93 BPM | TEMPERATURE: 97 F | SYSTOLIC BLOOD PRESSURE: 146 MMHG | DIASTOLIC BLOOD PRESSURE: 72 MMHG

## 2024-05-15 DIAGNOSIS — I70.232 ATHEROSCLEROSIS OF NATIVE ARTERY OF RIGHT LOWER EXTREMITY WITH ULCERATION OF CALF (HCC): Primary | ICD-10-CM

## 2024-05-15 DIAGNOSIS — L97.211 LOWER LIMB ULCER, CALF, RIGHT, LIMITED TO BREAKDOWN OF SKIN (HCC): ICD-10-CM

## 2024-05-15 DIAGNOSIS — L97.212 LOWER LIMB ULCER, CALF, RIGHT, WITH FAT LAYER EXPOSED (HCC): ICD-10-CM

## 2024-05-15 PROCEDURE — 11042 DBRDMT SUBQ TIS 1ST 20SQCM/<: CPT

## 2024-05-15 RX ORDER — TRIAMCINOLONE ACETONIDE 1 MG/G
OINTMENT TOPICAL ONCE
OUTPATIENT
Start: 2024-05-15 | End: 2024-05-15

## 2024-05-15 RX ORDER — LIDOCAINE HYDROCHLORIDE 20 MG/ML
JELLY TOPICAL ONCE
OUTPATIENT
Start: 2024-05-15 | End: 2024-05-15

## 2024-05-15 RX ORDER — GENTAMICIN SULFATE 1 MG/G
OINTMENT TOPICAL ONCE
OUTPATIENT
Start: 2024-05-15 | End: 2024-05-15

## 2024-05-15 RX ORDER — SODIUM CHLOR/HYPOCHLOROUS ACID 0.033 %
SOLUTION, IRRIGATION IRRIGATION ONCE
OUTPATIENT
Start: 2024-05-15 | End: 2024-05-15

## 2024-05-15 RX ORDER — CLOBETASOL PROPIONATE 0.5 MG/G
OINTMENT TOPICAL ONCE
OUTPATIENT
Start: 2024-05-15 | End: 2024-05-15

## 2024-05-15 RX ORDER — LIDOCAINE HYDROCHLORIDE 40 MG/ML
SOLUTION TOPICAL ONCE
Status: COMPLETED | OUTPATIENT
Start: 2024-05-15 | End: 2024-05-15

## 2024-05-15 RX ORDER — IBUPROFEN 200 MG
TABLET ORAL ONCE
OUTPATIENT
Start: 2024-05-15 | End: 2024-05-15

## 2024-05-15 RX ORDER — GINSENG 100 MG
CAPSULE ORAL ONCE
OUTPATIENT
Start: 2024-05-15 | End: 2024-05-15

## 2024-05-15 RX ORDER — LIDOCAINE 50 MG/G
OINTMENT TOPICAL ONCE
OUTPATIENT
Start: 2024-05-15 | End: 2024-05-15

## 2024-05-15 RX ORDER — LIDOCAINE 40 MG/G
CREAM TOPICAL ONCE
OUTPATIENT
Start: 2024-05-15 | End: 2024-05-15

## 2024-05-15 RX ORDER — LIDOCAINE HYDROCHLORIDE 40 MG/ML
SOLUTION TOPICAL ONCE
OUTPATIENT
Start: 2024-05-15 | End: 2024-05-15

## 2024-05-15 RX ORDER — BACITRACIN ZINC AND POLYMYXIN B SULFATE 500; 1000 [USP'U]/G; [USP'U]/G
OINTMENT TOPICAL ONCE
OUTPATIENT
Start: 2024-05-15 | End: 2024-05-15

## 2024-05-15 RX ORDER — BETAMETHASONE DIPROPIONATE 0.5 MG/G
CREAM TOPICAL ONCE
OUTPATIENT
Start: 2024-05-15 | End: 2024-05-15

## 2024-05-15 RX ADMIN — LIDOCAINE HYDROCHLORIDE 5 ML: 40 SOLUTION TOPICAL at 09:53

## 2024-05-15 NOTE — PROGRESS NOTES
Wound Healing Center Followup Visit Note    Referring Physician : Lita Swain APRN - NP  Steph Gonzáles  MEDICAL RECORD NUMBER:  [id number]  AGE: 68 y.o.   GENDER: female  : 1955  EPISODE DATE:  5/15/2024    Subjective:     Chief Complaint   Patient presents with    Wound Check     R leg      HISTORY of PRESENT ILLNESS HPI   Steph Gonzáles is a 68 y.o. female who presents today in regards to follow up evaluation and treatment of wound/ulcer.  That patient's past medical, family and social hx were reviewed and changes were made if present.          History of Wound Context:  The patient has had a wound of right calf just above the ankle which was first noted approximately 2024, tells me, the leg was injured, while moving on the gurney while she was hospitalized at [facility].  This has been treated by the patient.  On their initial visit to the wound healing center, 24, the patient has noted that the wound has not been improving.  The patient has not had similar previous wounds in the past.          Patient, when I saw her in the office last week, did not want to come to the wound care center the [facility], wanted to go to the [facility] due to some confusion, patient ended up coming to the wound care center at the [facility]     Patient denies any history of fever or chills, any chest pain or shortness of breath     Pt is currently not on abx.      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

## 2024-05-15 NOTE — TELEPHONE ENCOUNTER
Ask her to hold hydralazine and start at 50 mg p.o. 3 times a day once her systolic blood pressure stays above 120 mmHg.  Please ask her to drink two 8 ounce glass of water now.  If blood pressure remains below 90 by evening then she may need to go for ER for IV fluids.

## 2024-05-16 ENCOUNTER — OFFICE VISIT (OUTPATIENT)
Dept: CARDIOLOGY CLINIC | Age: 69
End: 2024-05-16
Payer: MEDICARE

## 2024-05-16 VITALS
BODY MASS INDEX: 33.31 KG/M2 | HEART RATE: 95 BPM | SYSTOLIC BLOOD PRESSURE: 112 MMHG | RESPIRATION RATE: 18 BRPM | WEIGHT: 181 LBS | HEIGHT: 62 IN | DIASTOLIC BLOOD PRESSURE: 80 MMHG

## 2024-05-16 DIAGNOSIS — I25.10 CORONARY ARTERY DISEASE INVOLVING NATIVE CORONARY ARTERY OF NATIVE HEART WITHOUT ANGINA PECTORIS: Primary | Chronic | ICD-10-CM

## 2024-05-16 PROCEDURE — G8417 CALC BMI ABV UP PARAM F/U: HCPCS | Performed by: INTERNAL MEDICINE

## 2024-05-16 PROCEDURE — 3017F COLORECTAL CA SCREEN DOC REV: CPT | Performed by: INTERNAL MEDICINE

## 2024-05-16 PROCEDURE — 93000 ELECTROCARDIOGRAM COMPLETE: CPT | Performed by: INTERNAL MEDICINE

## 2024-05-16 PROCEDURE — 1123F ACP DISCUSS/DSCN MKR DOCD: CPT | Performed by: INTERNAL MEDICINE

## 2024-05-16 PROCEDURE — G8399 PT W/DXA RESULTS DOCUMENT: HCPCS | Performed by: INTERNAL MEDICINE

## 2024-05-16 PROCEDURE — 99214 OFFICE O/P EST MOD 30 MIN: CPT | Performed by: INTERNAL MEDICINE

## 2024-05-16 PROCEDURE — 3079F DIAST BP 80-89 MM HG: CPT | Performed by: INTERNAL MEDICINE

## 2024-05-16 PROCEDURE — G8427 DOCREV CUR MEDS BY ELIG CLIN: HCPCS | Performed by: INTERNAL MEDICINE

## 2024-05-16 PROCEDURE — 4004F PT TOBACCO SCREEN RCVD TLK: CPT | Performed by: INTERNAL MEDICINE

## 2024-05-16 PROCEDURE — 1090F PRES/ABSN URINE INCON ASSESS: CPT | Performed by: INTERNAL MEDICINE

## 2024-05-16 PROCEDURE — 3074F SYST BP LT 130 MM HG: CPT | Performed by: INTERNAL MEDICINE

## 2024-05-16 NOTE — PROGRESS NOTES
Mother     Cancer Mother     Asthma Father     Colon Cancer Brother     Breast Cancer Maternal Aunt 74       Social History:  Social History     Socioeconomic History    Marital status:      Spouse name: Not on file    Number of children: 2    Years of education: 14    Highest education level: Not on file   Occupational History    Occupation: disability   Tobacco Use    Smoking status: Every Day     Current packs/day: 0.50     Average packs/day: 0.5 packs/day for 49.8 years (24.9 ttl pk-yrs)     Types: Cigarettes     Start date: 8/10/1974    Smokeless tobacco: Never    Tobacco comments:     2 cigs daily.     Vaping Use    Vaping Use: Never used   Substance and Sexual Activity    Alcohol use: Not Currently    Drug use: Yes     Types: Marijuana (Weed)     Comment: 4 times per week    Sexual activity: Defer   Other Topics Concern    Not on file   Social History Narrative    Pt lives with brother in her house-pt has never driven a car and depends on transportation     Social Determinants of Health     Financial Resource Strain: Not on file   Food Insecurity: No Food Insecurity (5/8/2024)    Hunger Vital Sign     Worried About Running Out of Food in the Last Year: Never true     Ran Out of Food in the Last Year: Never true   Transportation Needs: No Transportation Needs (5/8/2024)    PRAPARE - Transportation     Lack of Transportation (Medical): No     Lack of Transportation (Non-Medical): No   Physical Activity: Not on file   Stress: Not on file   Social Connections: Not on file   Intimate Partner Violence: Not on file   Housing Stability: Low Risk  (5/8/2024)    Housing Stability Vital Sign     Unable to Pay for Housing in the Last Year: No     Number of Places Lived in the Last Year: 1     Unstable Housing in the Last Year: No       Allergies:  Allergies   Allergen Reactions    Latex Hives    Bee Venom Anaphylaxis    Dilaudid [Hydromorphone Hcl] Itching    Dye [Iodides] Hives and Shortness Of Breath    Keflex

## 2024-05-16 NOTE — PATIENT INSTRUCTIONS
She is on guideline directed medical therapy for heart failure with improved ejection fraction, continue Entresto 97/103  mg  p.o. twice daily,  coreg 6.25 mg po twice a day.   Minoxidil 2.5 mg po twice a day for hypertension.   Follow up with EP service as scheduled.   Advised to stop Hydralazine due to low BP  She was strongly encouraged to adhere to strict cardiac diet and restrict daily salt intake to less than 2 g  Continue Imdur 60 mg p.o. daily.   She was advised again to stop smoking and told me that she does not want to quit.    Advised to follow-up with the GI service for evaluation of constipation and abdominal discomfort.  Follow up in one 3 months.

## 2024-05-17 NOTE — DISCHARGE INSTRUCTIONS
Visit Discharge/Physician Orders     Discharge condition: Stable     Assessment of pain at discharge: yes     Anesthetic used:  lidocaine 4%      Discharge to: Home     Left via:Private automobile     Accompanied by: accompanied by none     ECF/HHA: [facility]     Dressing Orders:Cleanse wound to right leg with normal saline, apply ALGINATE AG to wound bed and cover ABD and wrap with UNNA boot and coban from base of toes to base of knees- change weekly at wound care center and twice a week at home with home health. (Please order Meno CoFlex TLC Calamine two layer for three times a week changes)      Keep wrap dry at all times. If wrap becomes wet, becomes painful or falls 2 inches- may remove wrap- do daily dressing changes and apply spandigrip.      Treatment Orders:Vasculars to be done 4/4- reviewed    EAT DIET WITH PROTEINS AND VITAMIN C  TAKE A MULTIVITAMIN DAILY IF NOT CONTRAINDICATED    4/15 Miconazole Ointment prescription sent to pharmacy- apply to feet, toes, and legs- avoiding wounds twice a day for one month -  at prescription   4/22 Culture taken - On doxycyline      [facility] followup visit _____1 week _______________________  (Please note your next appointment above and if you are unable to keep, kindly give a 24 hour notice. Thank you.)     Physician signature:__________________________        If you experience any of the following, please call the Wound Care Center during business hours:     * Increase in Pain  * Temperature over 101  * Increase in drainage from your wound  * Drainage with a foul odor  * Bleeding  * Increase in swelling  * Need for compression bandage changes due to slippage, breakthrough drainage.     If you need medical attention outside of the business hours of the Wound Care Centers please contact your PCP or go to the nearest emergency room.

## 2024-05-21 ENCOUNTER — PROCEDURE VISIT (OUTPATIENT)
Dept: AUDIOLOGY | Age: 69
End: 2024-05-21
Payer: MEDICARE

## 2024-05-21 ENCOUNTER — OFFICE VISIT (OUTPATIENT)
Dept: ENT CLINIC | Age: 69
End: 2024-05-21
Payer: MEDICARE

## 2024-05-21 VITALS
SYSTOLIC BLOOD PRESSURE: 106 MMHG | DIASTOLIC BLOOD PRESSURE: 68 MMHG | HEART RATE: 84 BPM | WEIGHT: 178 LBS | BODY MASS INDEX: 32.76 KG/M2 | HEIGHT: 62 IN

## 2024-05-21 DIAGNOSIS — H93.13 TINNITUS OF BOTH EARS: ICD-10-CM

## 2024-05-21 DIAGNOSIS — H90.3 ASYMMETRICAL SENSORINEURAL HEARING LOSS: Primary | ICD-10-CM

## 2024-05-21 DIAGNOSIS — M26.621 ARTHRALGIA OF RIGHT TEMPOROMANDIBULAR JOINT: Primary | ICD-10-CM

## 2024-05-21 DIAGNOSIS — H91.8X3 ASYMMETRICAL HEARING LOSS: ICD-10-CM

## 2024-05-21 DIAGNOSIS — H90.3 SENSORINEURAL HEARING LOSS, BILATERAL: ICD-10-CM

## 2024-05-21 DIAGNOSIS — H92.01 RIGHT EAR PAIN: ICD-10-CM

## 2024-05-21 PROCEDURE — 99213 OFFICE O/P EST LOW 20 MIN: CPT | Performed by: OTOLARYNGOLOGY

## 2024-05-21 PROCEDURE — G8427 DOCREV CUR MEDS BY ELIG CLIN: HCPCS | Performed by: OTOLARYNGOLOGY

## 2024-05-21 PROCEDURE — 4004F PT TOBACCO SCREEN RCVD TLK: CPT | Performed by: OTOLARYNGOLOGY

## 2024-05-21 PROCEDURE — 1123F ACP DISCUSS/DSCN MKR DOCD: CPT | Performed by: OTOLARYNGOLOGY

## 2024-05-21 PROCEDURE — 92567 TYMPANOMETRY: CPT | Performed by: AUDIOLOGIST

## 2024-05-21 PROCEDURE — 1090F PRES/ABSN URINE INCON ASSESS: CPT | Performed by: OTOLARYNGOLOGY

## 2024-05-21 PROCEDURE — G8417 CALC BMI ABV UP PARAM F/U: HCPCS | Performed by: OTOLARYNGOLOGY

## 2024-05-21 PROCEDURE — G8399 PT W/DXA RESULTS DOCUMENT: HCPCS | Performed by: OTOLARYNGOLOGY

## 2024-05-21 PROCEDURE — 3074F SYST BP LT 130 MM HG: CPT | Performed by: OTOLARYNGOLOGY

## 2024-05-21 PROCEDURE — 92557 COMPREHENSIVE HEARING TEST: CPT | Performed by: AUDIOLOGIST

## 2024-05-21 PROCEDURE — 3078F DIAST BP <80 MM HG: CPT | Performed by: OTOLARYNGOLOGY

## 2024-05-21 PROCEDURE — 3017F COLORECTAL CA SCREEN DOC REV: CPT | Performed by: OTOLARYNGOLOGY

## 2024-05-21 ASSESSMENT — ENCOUNTER SYMPTOMS
VOICE CHANGE: 1
RESPIRATORY NEGATIVE: 1
EYES NEGATIVE: 1
ALLERGIC/IMMUNOLOGIC NEGATIVE: 1

## 2024-05-21 NOTE — PROGRESS NOTES
This patient was referred for audiometric and tympanometric testing by Dr. Diaz due to hearing loss. The patient had a hearing test in February and reported a change in hearing since then. She also reported a tinnitus and right ear pain.    Audiometry using pure tone air and bone conduction testing revealed a profound rising to moderately severe at 3000 Hz, sloping to a severe sensorineural hearing loss through 8000 Hz, in the right ear, and a severe rising to mild through 2000 Hz, sloping back to severe sensorineural hearing loss, in the left ear. Reliability was good. Speech reception thresholds were in good agreement with the pure tone averages, bilaterally. Speech discrimination scores were poor, bilaterally. Asymmetrical results obtained: Right ear worse.    Tympanometry revealed negative middle ear pressure (-220 daPa), in the right ear, and middle ear peak pressure and compliance within normal limits, in the left ear.    The results were reviewed with the patient.     Recommendations for follow up will be made pending physician consult.    The patient wanted to talk about hearing aids but could not stay as her ride was here.  ENT staff gave the patient a card and she will call us to schedule.     Electronically signed by Carmen Orr on 5/21/2024 at 11:44 AM

## 2024-05-21 NOTE — PROGRESS NOTES
Comments)    Norvasc [Amlodipine] Swelling    Oxycodone-Acetaminophen Swelling    Shellfish-Derived Products     Trazodone And Nefazodone          Review of Systems   Constitutional: Negative.    HENT:  Positive for voice change.    Eyes: Negative.    Respiratory: Negative.     Skin: Negative.    Allergic/Immunologic: Negative.    Neurological: Negative.    Hematological: Negative.    Psychiatric/Behavioral: Negative.     All other systems reviewed and are negative.              Objective:     Vitals:    05/21/24 1029   BP: 106/68   Pulse: 84     Physical Exam  Vitals reviewed.   Constitutional:       Appearance: Normal appearance.   HENT:      Head: Normocephalic.      Right Ear: Tympanic membrane, ear canal and external ear normal.      Left Ear: Tympanic membrane, ear canal and external ear normal.      Nose: Nose normal.      Mouth/Throat:      Mouth: Mucous membranes are moist.      Tongue: No lesions.      Palate: No mass and lesions.      Pharynx: Oropharynx is clear. Uvula midline.      Comments: Tender roof of mouth  No masses or lesions    Pt jaw palpated and does have some crepitance on the right and excursion is in midline.     Eyes:      Conjunctiva/sclera: Conjunctivae normal.   Cardiovascular:      Rate and Rhythm: Normal rate.      Pulses: Normal pulses.   Pulmonary:      Effort: Pulmonary effort is normal.   Musculoskeletal:      Cervical back: Normal range of motion and neck supple.   Skin:     General: Skin is warm.      Capillary Refill: Capillary refill takes less than 2 seconds.   Neurological:      Mental Status: She is alert.           Audiogram -                                              Assessment:       Diagnosis Orders   1. Arthralgia of right temporomandibular joint  Amb External Referral To Physical Therapy      2. Asymmetrical hearing loss                   Plan:      I will also refer pt to [facility] PT for therapy to see if this helps    No change to the hearing but she is interested in

## 2024-05-22 ENCOUNTER — HOSPITAL ENCOUNTER (OUTPATIENT)
Dept: WOUND CARE | Age: 69
Discharge: HOME | End: 2024-05-22
Payer: MEDICARE

## 2024-05-22 VITALS
HEART RATE: 72 BPM | BODY MASS INDEX: 32.76 KG/M2 | DIASTOLIC BLOOD PRESSURE: 70 MMHG | RESPIRATION RATE: 18 BRPM | TEMPERATURE: 98 F | SYSTOLIC BLOOD PRESSURE: 110 MMHG | HEIGHT: 62 IN | WEIGHT: 178 LBS

## 2024-05-22 DIAGNOSIS — L97.212 LOWER LIMB ULCER, CALF, RIGHT, WITH FAT LAYER EXPOSED (HCC): ICD-10-CM

## 2024-05-22 DIAGNOSIS — I70.232 ATHEROSCLEROSIS OF NATIVE ARTERY OF RIGHT LOWER EXTREMITY WITH ULCERATION OF CALF (HCC): Primary | ICD-10-CM

## 2024-05-22 DIAGNOSIS — L97.211 LOWER LIMB ULCER, CALF, RIGHT, LIMITED TO BREAKDOWN OF SKIN (HCC): ICD-10-CM

## 2024-05-22 PROCEDURE — 11042 DBRDMT SUBQ TIS 1ST 20SQCM/<: CPT

## 2024-05-22 RX ORDER — GENTAMICIN SULFATE 1 MG/G
OINTMENT TOPICAL ONCE
OUTPATIENT
Start: 2024-05-22 | End: 2024-05-22

## 2024-05-22 RX ORDER — LIDOCAINE HYDROCHLORIDE 20 MG/ML
JELLY TOPICAL ONCE
OUTPATIENT
Start: 2024-05-22 | End: 2024-05-22

## 2024-05-22 RX ORDER — TRIAMCINOLONE ACETONIDE 1 MG/G
OINTMENT TOPICAL ONCE
OUTPATIENT
Start: 2024-05-22 | End: 2024-05-22

## 2024-05-22 RX ORDER — LIDOCAINE 50 MG/G
OINTMENT TOPICAL ONCE
OUTPATIENT
Start: 2024-05-22 | End: 2024-05-22

## 2024-05-22 RX ORDER — IBUPROFEN 200 MG
TABLET ORAL ONCE
OUTPATIENT
Start: 2024-05-22 | End: 2024-05-22

## 2024-05-22 RX ORDER — CLOBETASOL PROPIONATE 0.5 MG/G
OINTMENT TOPICAL ONCE
OUTPATIENT
Start: 2024-05-22 | End: 2024-05-22

## 2024-05-22 RX ORDER — GINSENG 100 MG
CAPSULE ORAL ONCE
OUTPATIENT
Start: 2024-05-22 | End: 2024-05-22

## 2024-05-22 RX ORDER — SODIUM CHLOR/HYPOCHLOROUS ACID 0.033 %
SOLUTION, IRRIGATION IRRIGATION ONCE
OUTPATIENT
Start: 2024-05-22 | End: 2024-05-22

## 2024-05-22 RX ORDER — LIDOCAINE HYDROCHLORIDE 40 MG/ML
SOLUTION TOPICAL ONCE
Status: COMPLETED | OUTPATIENT
Start: 2024-05-22 | End: 2024-05-22

## 2024-05-22 RX ORDER — LIDOCAINE HYDROCHLORIDE 40 MG/ML
SOLUTION TOPICAL ONCE
OUTPATIENT
Start: 2024-05-22 | End: 2024-05-22

## 2024-05-22 RX ORDER — BACITRACIN ZINC AND POLYMYXIN B SULFATE 500; 1000 [USP'U]/G; [USP'U]/G
OINTMENT TOPICAL ONCE
OUTPATIENT
Start: 2024-05-22 | End: 2024-05-22

## 2024-05-22 RX ORDER — LIDOCAINE 40 MG/G
CREAM TOPICAL ONCE
OUTPATIENT
Start: 2024-05-22 | End: 2024-05-22

## 2024-05-22 RX ORDER — BETAMETHASONE DIPROPIONATE 0.5 MG/G
CREAM TOPICAL ONCE
OUTPATIENT
Start: 2024-05-22 | End: 2024-05-22

## 2024-05-22 RX ADMIN — LIDOCAINE HYDROCHLORIDE 5 ML: 40 SOLUTION TOPICAL at 09:41

## 2024-05-22 NOTE — PROGRESS NOTES
O2 dependent 09/16/2021    with bipap 3 liters    Osteoarthritis, generalized     Pain in both lower legs 10/11/2017    Peripheral vascular disease (HCC)     Pneumonia 01/26/2014    Pulmonary edema     resolved    PVD (peripheral vascular disease) with claudication (HCC) 08/30/2017    Sleep apnea     uses BI PAP    Status post peripheral artery angioplasty 10/31/2019    Tinea pedis of both feet 04/15/2024    Tobacco abuse     Tobacco abuse 10/11/2017    Tobacco dependence 06/30/2022    Tubular adenoma polyp of rectum     Urinary tract infection due to ESBL Klebsiella 03/08/2017    Ventral hernia      Past Surgical History:   Procedure Laterality Date    ANGIOPLASTY  11/13/2018    Dr. Gregg DCB & athrectomy L SFA & Popliteal    BREAST SURGERY  2000    bilateral reduction    BRONCHIAL BRUSH BIOPSY  1/25/2013    Dr Villegas    CARDIAC CATHETERIZATION  04/20/2015    Dr. Cardoso    CARDIAC SURGERY  12/2011     DR. ROCHA, [facility],  follows Dr Crawford    CHOLECYSTECTOMY  YRS AGO    OPEN    COLONOSCOPY  11/15/2013    Dr Borja    COLONOSCOPY  12/23/2016    Dr Borja-tubular adenoma & hyperplastic polyps, melanosis coli (repeat one year 12/2017)    CORONARY ARTERY BYPASS GRAFT      ECHO COMPLETE  10/9/2013         ENDOSCOPY, COLON, DIAGNOSTIC  04/18/2017    EP DEVICE PROCEDURE N/A 5/10/2024    Loop recorder insert performed by García Ocasio [place] [facility]    GALLBLADDER SURGERY      gallstones removed, [facility] 8/2017    HYSTERECTOMY (CERVIX STATUS UNKNOWN)      JOINT REPLACEMENT  2011    LEFT KNEE    KNEE SURGERY      left knee replacement    LARYNGOSCOPY N/A 4/4/2022    DIRECT LARYNGOSCOPY WITH BIOPSY performed by Jose Diaz DO at [facility] OR    LARYNGOSCOPY Bilateral 10/4/2023    DIRECT LARYNGOSCOPY AND BIOPSY RIGHT FALSE VOCAL CORD performed by Jose Diaz DO at [facility] OR    LAYER WOUND CLOSURE Left [id number]    LIVER BIOPSY  1/8/2016    [facility]    POLYSOMNOGRAPHY  1/2016    [facility]

## 2024-05-24 ENCOUNTER — NURSE ONLY (OUTPATIENT)
Dept: NON INVASIVE DIAGNOSTICS | Age: 69
End: 2024-05-24

## 2024-05-24 DIAGNOSIS — Z95.818 STATUS POST PLACEMENT OF IMPLANTABLE LOOP RECORDER: Primary | ICD-10-CM

## 2024-05-24 RX ORDER — CARVEDILOL 3.12 MG/1
3.12 TABLET ORAL 2 TIMES DAILY WITH MEALS
COMMUNITY
End: 2024-05-24 | Stop reason: SDUPTHER

## 2024-05-24 RX ORDER — CARVEDILOL 3.12 MG/1
3.12 TABLET ORAL 2 TIMES DAILY WITH MEALS
Qty: 180 TABLET | Refills: 0 | Status: SHIPPED | OUTPATIENT
Start: 2024-05-24 | End: 2024-08-22

## 2024-05-24 NOTE — DISCHARGE INSTRUCTIONS
Visit Discharge/Physician Orders     Discharge condition: Stable     Assessment of pain at discharge: yes     Anesthetic used:  lidocaine 4%      Discharge to: Home     Left via:Private automobile     Accompanied by: accompanied by none     ECF/HHA: [facility]     Dressing Orders:Cleanse wound to right leg with normal saline, apply ALGINATE AG to wound bed and cover ABD and unna boot, leave in place for 1 week    Keep wrap dry at all times. If wrap becomes wet, becomes painful or falls 2 inches- may remove wrap- do daily dressing changes and apply jammie and coban     Treatment Orders:Vasculars to be done 4/4- reviewed    EAT DIET WITH PROTEINS AND VITAMIN C  TAKE A MULTIVITAMIN DAILY IF NOT CONTRAINDICATED         [facility] followup visit _____1 week ________  (Please note your next appointment above and if you are unable to keep, kindly give a 24 hour notice. Thank you.)     Physician signature:__________________________        If you experience any of the following, please call the Wound Care Center during business hours:     * Increase in Pain  * Temperature over 101  * Increase in drainage from your wound  * Drainage with a foul odor  * Bleeding  * Increase in swelling  * Need for compression bandage changes due to slippage, breakthrough drainage.     If you need medical attention outside of the business hours of the Wound Care Centers please contact your PCP or go to the nearest emergency room.

## 2024-05-24 NOTE — PROGRESS NOTES
Patient's home monitor is not set up yet. Unable to create patient in [place] with out carelink data.   See media of ILR interrogation until the patient sends  a remote transmission.       6 pause episodes: 3-4 seconds. During night time hours. Patient states she does get up to use the restroom multiple times through out the night and might feel a little bit dizzy or lightheadedness. Patient denies syncope. One episode recorded as 10 minutes. Undersensing noted.      Reviewed with Karla Leary NP.     Plan:   Decrease coreg to 3.125mg BID   New script sent to the pharmacy.   Patient instructed to hook up her monitor.   Office in 3 months      Tatum SANDY,RN   [facility] [place]   [facility]

## 2024-05-28 ENCOUNTER — HOSPITAL ENCOUNTER (EMERGENCY)
Age: 69
Discharge: LEFT BEFORE DISCHARGE | End: 2024-05-28
Payer: MEDICARE

## 2024-05-28 ENCOUNTER — APPOINTMENT (OUTPATIENT)
Dept: GENERAL RADIOLOGY | Age: 69
End: 2024-05-28
Payer: MEDICARE

## 2024-05-28 VITALS
DIASTOLIC BLOOD PRESSURE: 75 MMHG | RESPIRATION RATE: 16 BRPM | TEMPERATURE: 97 F | WEIGHT: 178 LBS | HEIGHT: 62 IN | HEART RATE: 90 BPM | SYSTOLIC BLOOD PRESSURE: 145 MMHG | BODY MASS INDEX: 32.76 KG/M2 | OXYGEN SATURATION: 97 %

## 2024-05-28 PROCEDURE — 99283 EMERGENCY DEPT VISIT LOW MDM: CPT

## 2024-05-28 PROCEDURE — 71046 X-RAY EXAM CHEST 2 VIEWS: CPT

## 2024-05-28 ASSESSMENT — PAIN DESCRIPTION - DESCRIPTORS: DESCRIPTORS: ACHING

## 2024-05-28 ASSESSMENT — PAIN - FUNCTIONAL ASSESSMENT: PAIN_FUNCTIONAL_ASSESSMENT: 0-10

## 2024-05-28 ASSESSMENT — PAIN DESCRIPTION - LOCATION: LOCATION: GENERALIZED

## 2024-05-28 ASSESSMENT — PAIN SCALES - GENERAL: PAINLEVEL_OUTOF10: 10

## 2024-05-28 NOTE — ED TRIAGE NOTES
FIRST PROVIDER CONTACT ASSESSMENT NOTE       Department of Emergency Medicine                 First Provider Note            24  4:10 PM EDT    Date of Encounter: No admission date for patient encounter.    Patient Name: tSeph Gonzáles  : 1955  MRN: [id number]    Chief Complaint: Cough (Seen in urgent care had chest x ray), Pneumonia, and Urinary Tract Infection (Confirmed at urgent care)      History of Present Illness:   Steph Gonzáles is a 68 y.o. female who presents to the ED for cough and UTI.     States she just came from Urgent Care.   States she was told she has pneumonia RLL and UTI.    Was directed to come to ED.   States she just wants put on abs and to \"go home\"    Focused Physical Exam:  VS:    ED Triage Vitals   BP Temp Temp src Pulse Resp SpO2 Height Weight   -- -- -- -- -- -- -- --        Physical Ex: Constitutional: Alert and non-toxic.    Medical History:  has a past medical history of Asthma, Atherosclerosis of native artery of left leg with rest pain (HCC), 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), CAD (coronary artery disease), Cellulitis and abscess of trunk, Cerebellar infarct (HCC), Chronic back pain, Chronic kidney disease, Chronic systolic congestive heart failure (HCC), Chronic venous insufficiency, COPD (chronic obstructive pulmonary disease) (HCC), COVID-19, Depression, Diabetes mellitus (HCC), Diabetic neuropathy (HCC), Diverticulosis, Fatty liver, Glaucoma, open angle, Hepatic encephalopathy (HCC), Hiatal hernia, History of blood transfusion, Hyperlipidemia, Hyperplastic colon polyp, Hypertension, Incontinence, Liver mass, Lower limb ulcer, calf, right, limited to breakdown of skin (HCC), Lower limb ulcer, calf, right, with fat layer exposed (HCC), Morbid obesity (HCC), Movement disorder, Myocardial infarction (HCC), O2 dependent, Osteoarthritis, generalized, Pain in both lower legs, Peripheral vascular disease (HCC),

## 2024-05-29 ENCOUNTER — HOSPITAL ENCOUNTER (OUTPATIENT)
Dept: WOUND CARE | Age: 69
Discharge: HOME | End: 2024-05-29
Attending: PODIATRIST
Payer: MEDICARE

## 2024-05-29 VITALS
TEMPERATURE: 97.2 F | DIASTOLIC BLOOD PRESSURE: 67 MMHG | SYSTOLIC BLOOD PRESSURE: 143 MMHG | HEART RATE: 94 BPM | RESPIRATION RATE: 18 BRPM

## 2024-05-29 DIAGNOSIS — L97.211 LOWER LIMB ULCER, CALF, RIGHT, LIMITED TO BREAKDOWN OF SKIN (HCC): ICD-10-CM

## 2024-05-29 DIAGNOSIS — I70.232 ATHEROSCLEROSIS OF NATIVE ARTERY OF RIGHT LOWER EXTREMITY WITH ULCERATION OF CALF (HCC): Primary | ICD-10-CM

## 2024-05-29 DIAGNOSIS — L97.212 LOWER LIMB ULCER, CALF, RIGHT, WITH FAT LAYER EXPOSED (HCC): ICD-10-CM

## 2024-05-29 PROCEDURE — 11042 DBRDMT SUBQ TIS 1ST 20SQCM/<: CPT

## 2024-05-29 RX ORDER — GINSENG 100 MG
CAPSULE ORAL ONCE
OUTPATIENT
Start: 2024-05-29 | End: 2024-05-29

## 2024-05-29 RX ORDER — CLOBETASOL PROPIONATE 0.5 MG/G
OINTMENT TOPICAL ONCE
OUTPATIENT
Start: 2024-05-29 | End: 2024-05-29

## 2024-05-29 RX ORDER — IBUPROFEN 200 MG
TABLET ORAL ONCE
OUTPATIENT
Start: 2024-05-29 | End: 2024-05-29

## 2024-05-29 RX ORDER — BACITRACIN ZINC AND POLYMYXIN B SULFATE 500; 1000 [USP'U]/G; [USP'U]/G
OINTMENT TOPICAL ONCE
OUTPATIENT
Start: 2024-05-29 | End: 2024-05-29

## 2024-05-29 RX ORDER — GENTAMICIN SULFATE 1 MG/G
OINTMENT TOPICAL ONCE
OUTPATIENT
Start: 2024-05-29 | End: 2024-05-29

## 2024-05-29 RX ORDER — LIDOCAINE HYDROCHLORIDE 20 MG/ML
JELLY TOPICAL ONCE
OUTPATIENT
Start: 2024-05-29 | End: 2024-05-29

## 2024-05-29 RX ORDER — LIDOCAINE HYDROCHLORIDE 40 MG/ML
SOLUTION TOPICAL ONCE
Status: COMPLETED | OUTPATIENT
Start: 2024-05-29 | End: 2024-05-29

## 2024-05-29 RX ORDER — TRIAMCINOLONE ACETONIDE 1 MG/G
OINTMENT TOPICAL ONCE
OUTPATIENT
Start: 2024-05-29 | End: 2024-05-29

## 2024-05-29 RX ORDER — LIDOCAINE HYDROCHLORIDE 40 MG/ML
SOLUTION TOPICAL ONCE
OUTPATIENT
Start: 2024-05-29 | End: 2024-05-29

## 2024-05-29 RX ORDER — BETAMETHASONE DIPROPIONATE 0.5 MG/G
CREAM TOPICAL ONCE
OUTPATIENT
Start: 2024-05-29 | End: 2024-05-29

## 2024-05-29 RX ORDER — SODIUM CHLOR/HYPOCHLOROUS ACID 0.033 %
SOLUTION, IRRIGATION IRRIGATION ONCE
OUTPATIENT
Start: 2024-05-29 | End: 2024-05-29

## 2024-05-29 RX ORDER — LIDOCAINE 40 MG/G
CREAM TOPICAL ONCE
OUTPATIENT
Start: 2024-05-29 | End: 2024-05-29

## 2024-05-29 RX ORDER — LIDOCAINE 50 MG/G
OINTMENT TOPICAL ONCE
OUTPATIENT
Start: 2024-05-29 | End: 2024-05-29

## 2024-05-29 RX ADMIN — LIDOCAINE HYDROCHLORIDE: 40 SOLUTION TOPICAL at 10:52

## 2024-05-29 NOTE — PROGRESS NOTES
Wound Healing Center Followup Visit Note    Referring Physician : Lita Swain APRN - NP  Steph Gonzáles  MEDICAL RECORD NUMBER:  [id number]  AGE: 68 y.o.   GENDER: female  : 1955  EPISODE DATE:  2024    Subjective:     Chief Complaint   Patient presents with    Wound Check     R leg      HISTORY of PRESENT ILLNESS HPI   Steph Gonzáles is a 68 y.o. female who presents today in regards to follow up evaluation and treatment of wound/ulcer.  That patient's past medical, family and social hx were reviewed and changes were made if present.          History of Wound Context:  The patient has had a wound of right calf just above the ankle which was first noted approximately 2024, tells me, the leg was injured, while moving on the gurney while she was hospitalized at [facility].  This has been treated by the patient.  On their initial visit to the wound healing center, 24, the patient has noted that the wound has not been improving.  The patient has not had similar previous wounds in the past.          Patient, when I saw her in the office last week, did not want to come to the wound care center the [facility], wanted to go to the [facility] due to some confusion, patient ended up coming to the wound care center at the [facility]     Patient denies any history of fever or chills, any chest pain or shortness of breath     Pt is currently not on abx.      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

## 2024-06-05 ENCOUNTER — TELEPHONE (OUTPATIENT)
Dept: CARDIOLOGY CLINIC | Age: 69
End: 2024-06-05

## 2024-06-05 ENCOUNTER — HOSPITAL ENCOUNTER (OUTPATIENT)
Dept: WOUND CARE | Age: 69
Discharge: HOME | End: 2024-06-05
Attending: PODIATRIST
Payer: MEDICARE

## 2024-06-05 VITALS
TEMPERATURE: 96.9 F | HEIGHT: 62 IN | HEART RATE: 80 BPM | WEIGHT: 178 LBS | DIASTOLIC BLOOD PRESSURE: 70 MMHG | BODY MASS INDEX: 32.76 KG/M2 | RESPIRATION RATE: 18 BRPM | SYSTOLIC BLOOD PRESSURE: 140 MMHG

## 2024-06-05 DIAGNOSIS — L97.212 LOWER LIMB ULCER, CALF, RIGHT, WITH FAT LAYER EXPOSED (HCC): ICD-10-CM

## 2024-06-05 DIAGNOSIS — L97.211 LOWER LIMB ULCER, CALF, RIGHT, LIMITED TO BREAKDOWN OF SKIN (HCC): ICD-10-CM

## 2024-06-05 DIAGNOSIS — I70.232 ATHEROSCLEROSIS OF NATIVE ARTERY OF RIGHT LOWER EXTREMITY WITH ULCERATION OF CALF (HCC): Primary | ICD-10-CM

## 2024-06-05 PROCEDURE — 11042 DBRDMT SUBQ TIS 1ST 20SQCM/<: CPT

## 2024-06-05 RX ORDER — CLOBETASOL PROPIONATE 0.5 MG/G
OINTMENT TOPICAL ONCE
OUTPATIENT
Start: 2024-06-05 | End: 2024-06-05

## 2024-06-05 RX ORDER — LIDOCAINE HYDROCHLORIDE 20 MG/ML
JELLY TOPICAL ONCE
OUTPATIENT
Start: 2024-06-05 | End: 2024-06-05

## 2024-06-05 RX ORDER — TRIAMCINOLONE ACETONIDE 1 MG/G
OINTMENT TOPICAL ONCE
OUTPATIENT
Start: 2024-06-05 | End: 2024-06-05

## 2024-06-05 RX ORDER — BETAMETHASONE DIPROPIONATE 0.5 MG/G
CREAM TOPICAL ONCE
OUTPATIENT
Start: 2024-06-05 | End: 2024-06-05

## 2024-06-05 RX ORDER — SODIUM CHLOR/HYPOCHLOROUS ACID 0.033 %
SOLUTION, IRRIGATION IRRIGATION ONCE
OUTPATIENT
Start: 2024-06-05 | End: 2024-06-05

## 2024-06-05 RX ORDER — IBUPROFEN 200 MG
TABLET ORAL ONCE
OUTPATIENT
Start: 2024-06-05 | End: 2024-06-05

## 2024-06-05 RX ORDER — LIDOCAINE HYDROCHLORIDE 40 MG/ML
SOLUTION TOPICAL ONCE
Status: COMPLETED | OUTPATIENT
Start: 2024-06-05 | End: 2024-06-05

## 2024-06-05 RX ORDER — GINSENG 100 MG
CAPSULE ORAL ONCE
OUTPATIENT
Start: 2024-06-05 | End: 2024-06-05

## 2024-06-05 RX ORDER — BACITRACIN ZINC AND POLYMYXIN B SULFATE 500; 1000 [USP'U]/G; [USP'U]/G
OINTMENT TOPICAL ONCE
OUTPATIENT
Start: 2024-06-05 | End: 2024-06-05

## 2024-06-05 RX ORDER — LIDOCAINE 40 MG/G
CREAM TOPICAL ONCE
OUTPATIENT
Start: 2024-06-05 | End: 2024-06-05

## 2024-06-05 RX ORDER — GENTAMICIN SULFATE 1 MG/G
OINTMENT TOPICAL ONCE
OUTPATIENT
Start: 2024-06-05 | End: 2024-06-05

## 2024-06-05 RX ORDER — LIDOCAINE HYDROCHLORIDE 40 MG/ML
SOLUTION TOPICAL ONCE
OUTPATIENT
Start: 2024-06-05 | End: 2024-06-05

## 2024-06-05 RX ORDER — LIDOCAINE 50 MG/G
OINTMENT TOPICAL ONCE
OUTPATIENT
Start: 2024-06-05 | End: 2024-06-05

## 2024-06-05 RX ADMIN — LIDOCAINE HYDROCHLORIDE 5 ML: 40 SOLUTION TOPICAL at 09:39

## 2024-06-05 ASSESSMENT — PAIN DESCRIPTION - DESCRIPTORS: DESCRIPTORS: STABBING;SHARP

## 2024-06-05 ASSESSMENT — PAIN SCALES - GENERAL: PAINLEVEL_OUTOF10: 9

## 2024-06-05 NOTE — DISCHARGE INSTRUCTIONS

## 2024-06-05 NOTE — PROGRESS NOTES
Discharge Instructions         Visit Discharge/Physician Orders     Discharge condition: Stable     Assessment of pain at discharge: yes     Anesthetic used:  lidocaine 4%      Discharge to: Home     Left via:Private automobile     Accompanied by: accompanied by none     [facility]: [facility]     Dressing Orders:Cleanse wound to right leg with normal saline, apply ALGINATE AG to wound bed and cover ABD and wrap with UNNA boot and coban from base of toes to base of knees- change weekly at wound care center and twice a week at home with home health. (Please order Stas CoFlex TLC Calamine two layer for three times a week changes)      Keep wrap dry at all times. If wrap becomes wet, becomes painful or falls 2 inches- may remove wrap- do daily dressing changes and apply spandigrip.      Treatment Orders:Vasculars to be done 4/4- reviewed    EAT DIET WITH PROTEINS AND VITAMIN C  TAKE A MULTIVITAMIN DAILY IF NOT CONTRAINDICATED    4/15 Miconazole Ointment prescription sent to pharmacy- apply to feet, toes, and legs- avoiding wounds twice a day for one month -  at prescription   4/22 Culture taken - On doxycyline      [facility] followup visit _____1 week _______________________  (Please note your next appointment above and if you are unable to keep, kindly give a 24 hour notice. Thank you.)     Physician signature:__________________________        If you experience any of the following, please call the Wound Care Center during business hours:     * Increase in Pain  * Temperature over 101  * Increase in drainage from your wound  * Drainage with a foul odor  * Bleeding  * Increase in swelling  * Need for compression bandage changes due to slippage, breakthrough drainage.     If you need medical attention outside of the business hours of the Wound Care Centers please contact your PCP or go to the nearest emergency room.              Electronically signed by Pedrito Bear DPM on 5/22/2024 at 9:58

## 2024-06-12 ENCOUNTER — HOSPITAL ENCOUNTER (OUTPATIENT)
Dept: WOUND CARE | Age: 69
Discharge: HOME | End: 2024-06-12
Attending: PODIATRIST
Payer: MEDICARE

## 2024-06-12 VITALS
HEIGHT: 62 IN | RESPIRATION RATE: 20 BRPM | SYSTOLIC BLOOD PRESSURE: 149 MMHG | TEMPERATURE: 96.9 F | BODY MASS INDEX: 32.76 KG/M2 | HEART RATE: 82 BPM | DIASTOLIC BLOOD PRESSURE: 71 MMHG | WEIGHT: 178 LBS

## 2024-06-12 DIAGNOSIS — L97.212 LOWER LIMB ULCER, CALF, RIGHT, WITH FAT LAYER EXPOSED (HCC): ICD-10-CM

## 2024-06-12 DIAGNOSIS — L97.211 LOWER LIMB ULCER, CALF, RIGHT, LIMITED TO BREAKDOWN OF SKIN (HCC): ICD-10-CM

## 2024-06-12 DIAGNOSIS — I70.232 ATHEROSCLEROSIS OF NATIVE ARTERY OF RIGHT LOWER EXTREMITY WITH ULCERATION OF CALF (HCC): Primary | ICD-10-CM

## 2024-06-12 PROCEDURE — 11042 DBRDMT SUBQ TIS 1ST 20SQCM/<: CPT

## 2024-06-12 RX ORDER — LIDOCAINE HYDROCHLORIDE 40 MG/ML
SOLUTION TOPICAL ONCE
Status: COMPLETED | OUTPATIENT
Start: 2024-06-12 | End: 2024-06-12

## 2024-06-12 RX ORDER — LIDOCAINE 40 MG/G
CREAM TOPICAL ONCE
OUTPATIENT
Start: 2024-06-12 | End: 2024-06-12

## 2024-06-12 RX ORDER — LIDOCAINE HYDROCHLORIDE 20 MG/ML
JELLY TOPICAL ONCE
OUTPATIENT
Start: 2024-06-12 | End: 2024-06-12

## 2024-06-12 RX ORDER — LIDOCAINE 50 MG/G
OINTMENT TOPICAL ONCE
OUTPATIENT
Start: 2024-06-12 | End: 2024-06-12

## 2024-06-12 RX ORDER — GINSENG 100 MG
CAPSULE ORAL ONCE
OUTPATIENT
Start: 2024-06-12 | End: 2024-06-12

## 2024-06-12 RX ORDER — BACITRACIN ZINC AND POLYMYXIN B SULFATE 500; 1000 [USP'U]/G; [USP'U]/G
OINTMENT TOPICAL ONCE
OUTPATIENT
Start: 2024-06-12 | End: 2024-06-12

## 2024-06-12 RX ORDER — SODIUM CHLOR/HYPOCHLOROUS ACID 0.033 %
SOLUTION, IRRIGATION IRRIGATION ONCE
OUTPATIENT
Start: 2024-06-12 | End: 2024-06-12

## 2024-06-12 RX ORDER — TRIAMCINOLONE ACETONIDE 1 MG/G
OINTMENT TOPICAL ONCE
OUTPATIENT
Start: 2024-06-12 | End: 2024-06-12

## 2024-06-12 RX ORDER — BETAMETHASONE DIPROPIONATE 0.5 MG/G
CREAM TOPICAL ONCE
OUTPATIENT
Start: 2024-06-12 | End: 2024-06-12

## 2024-06-12 RX ORDER — IBUPROFEN 200 MG
TABLET ORAL ONCE
OUTPATIENT
Start: 2024-06-12 | End: 2024-06-12

## 2024-06-12 RX ORDER — GUAIFENESIN 200 MG/10ML
100 LIQUID ORAL 3 TIMES DAILY PRN
COMMUNITY

## 2024-06-12 RX ORDER — CLOBETASOL PROPIONATE 0.5 MG/G
OINTMENT TOPICAL ONCE
OUTPATIENT
Start: 2024-06-12 | End: 2024-06-12

## 2024-06-12 RX ORDER — GENTAMICIN SULFATE 1 MG/G
OINTMENT TOPICAL ONCE
OUTPATIENT
Start: 2024-06-12 | End: 2024-06-12

## 2024-06-12 RX ORDER — LIDOCAINE HYDROCHLORIDE 40 MG/ML
SOLUTION TOPICAL ONCE
OUTPATIENT
Start: 2024-06-12 | End: 2024-06-12

## 2024-06-12 RX ADMIN — LIDOCAINE HYDROCHLORIDE 5 ML: 40 SOLUTION TOPICAL at 10:07

## 2024-06-12 ASSESSMENT — PAIN DESCRIPTION - ORIENTATION: ORIENTATION: RIGHT

## 2024-06-12 ASSESSMENT — PAIN SCALES - GENERAL: PAINLEVEL_OUTOF10: 10

## 2024-06-12 ASSESSMENT — PAIN DESCRIPTION - DESCRIPTORS: DESCRIPTORS: STABBING;BURNING

## 2024-06-12 NOTE — PROGRESS NOTES
Wound Healing Center Followup Visit Note    Referring Physician : Lita Swain APRN - NP  Steph Gonzáles  MEDICAL RECORD NUMBER:  [id number]  AGE: 68 y.o.   GENDER: female  : 1955  EPISODE DATE:  2024    Subjective:     Chief Complaint   Patient presents with    Wound Check     \"Right leg\"      HISTORY of PRESENT ILLNESS HPI   Steph Gonzáles is a 68 y.o. female who presents today in regards to follow up evaluation and treatment of wound/ulcer.  That patient's past medical, family and social hx were reviewed and changes were made if present.          History of Wound Context:  The patient has had a wound of right calf just above the ankle which was first noted approximately 2024, tells me, the leg was injured, while moving on the gurney while she was hospitalized at [facility].  This has been treated by the patient.  On their initial visit to the wound healing center, 24, the patient has noted that the wound has not been improving.  The patient has not had similar previous wounds in the past.          Patient, when I saw her in the office last week, did not want to come to the wound care center the [facility], wanted to go to the [facility] due to some confusion, patient ended up coming to the wound care center at the [facility]     Patient denies any history of fever or chills, any chest pain or shortness of breath     Pt is currently not on abx.      2024  The right leg ulcer improved slightly, skin, eschar, some subcu and fat layer      Discussed with the patient regarding the lower extremity arterial Doppler study, informed her that she does have arterial occlusive disease but there is a presently adequate collateral flow for tissue healing, for now will follow conservatively with local wound care, if no improvement may require angiography      The lower extremity arterial Doppler study was personally reviewed by me as outlined below

## 2024-06-12 NOTE — DISCHARGE INSTRUCTIONS

## 2024-06-18 PROCEDURE — 0JBN0ZZ EXCISION OF RIGHT LOWER LEG SUBCUTANEOUS TISSUE AND FASCIA, OPEN APPROACH: ICD-10-PCS | Performed by: PODIATRIST

## 2024-06-19 ENCOUNTER — HOSPITAL ENCOUNTER (OUTPATIENT)
Dept: WOUND CARE | Age: 69
Discharge: HOME | End: 2024-06-19
Attending: PODIATRIST
Payer: MEDICARE

## 2024-06-19 VITALS
RESPIRATION RATE: 18 BRPM | BODY MASS INDEX: 32.76 KG/M2 | WEIGHT: 178 LBS | DIASTOLIC BLOOD PRESSURE: 70 MMHG | HEIGHT: 62 IN | TEMPERATURE: 97.4 F | SYSTOLIC BLOOD PRESSURE: 131 MMHG | HEART RATE: 80 BPM

## 2024-06-19 DIAGNOSIS — I70.232 ATHEROSCLEROSIS OF NATIVE ARTERY OF RIGHT LOWER EXTREMITY WITH ULCERATION OF CALF (HCC): Primary | ICD-10-CM

## 2024-06-19 DIAGNOSIS — L97.212 LOWER LIMB ULCER, CALF, RIGHT, WITH FAT LAYER EXPOSED (HCC): ICD-10-CM

## 2024-06-19 DIAGNOSIS — L97.211 LOWER LIMB ULCER, CALF, RIGHT, LIMITED TO BREAKDOWN OF SKIN (HCC): ICD-10-CM

## 2024-06-19 PROCEDURE — 11042 DBRDMT SUBQ TIS 1ST 20SQCM/<: CPT

## 2024-06-19 PROCEDURE — 0JBN0ZZ EXCISION OF RIGHT LOWER LEG SUBCUTANEOUS TISSUE AND FASCIA, OPEN APPROACH: ICD-10-PCS | Performed by: PODIATRIST

## 2024-06-19 RX ORDER — LIDOCAINE 40 MG/G
CREAM TOPICAL ONCE
OUTPATIENT
Start: 2024-06-19 | End: 2024-06-19

## 2024-06-19 RX ORDER — BETAMETHASONE DIPROPIONATE 0.5 MG/G
CREAM TOPICAL ONCE
OUTPATIENT
Start: 2024-06-19 | End: 2024-06-19

## 2024-06-19 RX ORDER — LIDOCAINE HYDROCHLORIDE 20 MG/ML
JELLY TOPICAL ONCE
OUTPATIENT
Start: 2024-06-19 | End: 2024-06-19

## 2024-06-19 RX ORDER — IBUPROFEN 200 MG
TABLET ORAL ONCE
OUTPATIENT
Start: 2024-06-19 | End: 2024-06-19

## 2024-06-19 RX ORDER — LIDOCAINE HYDROCHLORIDE 40 MG/ML
SOLUTION TOPICAL ONCE
Status: COMPLETED | OUTPATIENT
Start: 2024-06-19 | End: 2024-06-19

## 2024-06-19 RX ORDER — LIDOCAINE 50 MG/G
OINTMENT TOPICAL ONCE
OUTPATIENT
Start: 2024-06-19 | End: 2024-06-19

## 2024-06-19 RX ORDER — SODIUM CHLOR/HYPOCHLOROUS ACID 0.033 %
SOLUTION, IRRIGATION IRRIGATION ONCE
OUTPATIENT
Start: 2024-06-19 | End: 2024-06-19

## 2024-06-19 RX ORDER — BACITRACIN ZINC AND POLYMYXIN B SULFATE 500; 1000 [USP'U]/G; [USP'U]/G
OINTMENT TOPICAL ONCE
OUTPATIENT
Start: 2024-06-19 | End: 2024-06-19

## 2024-06-19 RX ORDER — CLOBETASOL PROPIONATE 0.5 MG/G
OINTMENT TOPICAL ONCE
OUTPATIENT
Start: 2024-06-19 | End: 2024-06-19

## 2024-06-19 RX ORDER — LIDOCAINE HYDROCHLORIDE 40 MG/ML
SOLUTION TOPICAL ONCE
OUTPATIENT
Start: 2024-06-19 | End: 2024-06-19

## 2024-06-19 RX ORDER — GENTAMICIN SULFATE 1 MG/G
OINTMENT TOPICAL ONCE
OUTPATIENT
Start: 2024-06-19 | End: 2024-06-19

## 2024-06-19 RX ORDER — GINSENG 100 MG
CAPSULE ORAL ONCE
OUTPATIENT
Start: 2024-06-19 | End: 2024-06-19

## 2024-06-19 RX ORDER — TRIAMCINOLONE ACETONIDE 1 MG/G
OINTMENT TOPICAL ONCE
OUTPATIENT
Start: 2024-06-19 | End: 2024-06-19

## 2024-06-19 RX ADMIN — LIDOCAINE HYDROCHLORIDE 10 ML: 40 SOLUTION TOPICAL at 09:55

## 2024-06-19 ASSESSMENT — PAIN DESCRIPTION - LOCATION: LOCATION: LEG

## 2024-06-19 ASSESSMENT — PAIN SCALES - GENERAL: PAINLEVEL_OUTOF10: 8

## 2024-06-19 ASSESSMENT — PAIN - FUNCTIONAL ASSESSMENT: PAIN_FUNCTIONAL_ASSESSMENT: PREVENTS OR INTERFERES SOME ACTIVE ACTIVITIES AND ADLS

## 2024-06-19 ASSESSMENT — PAIN DESCRIPTION - PAIN TYPE: TYPE: CHRONIC PAIN

## 2024-06-19 ASSESSMENT — PAIN DESCRIPTION - ONSET: ONSET: ON-GOING

## 2024-06-19 ASSESSMENT — PAIN DESCRIPTION - FREQUENCY: FREQUENCY: CONTINUOUS

## 2024-06-19 ASSESSMENT — PAIN DESCRIPTION - ORIENTATION: ORIENTATION: RIGHT

## 2024-06-19 NOTE — DISCHARGE INSTRUCTIONS
Visit Discharge/Physician Orders     Discharge condition: Stable     Assessment of pain at discharge: yes     Anesthetic used:  lidocaine 4%      Discharge to: Home     Left via:Private automobile     Accompanied by: accompanied by none     [facility]: [facility]     Dressing Orders:Cleanse wound to right leg with saline apply alginate ag, unna boot      Keep wrap dry at all times. If wrap becomes wet, becomes painful or falls 2 inches- may remove wrap- do daily dressing changes and apply jammie and coban     Treatment Orders:US of right leg   EAT DIET WITH PROTEINS AND VITAMIN C  TAKE A MULTIVITAMIN DAILY IF NOT CONTRAINDICATED          [facility] followup visit _____1 week ________  (Please note your next appointment above and if you are unable to keep, kindly give a 24 hour notice. Thank you.)     Physician signature:__________________________        If you experience any of the following, please call the Wound Care Center during business hours:     * Increase in Pain  * Temperature over 101  * Increase in drainage from your wound  * Drainage with a foul odor  * Bleeding  * Increase in swelling  * Need for compression bandage changes due to slippage, breakthrough drainage.     If you need medical attention outside of the business hours of the Wound Care Centers please contact your PCP or go to the nearest emergency room.

## 2024-06-19 NOTE — PROGRESS NOTES
Wound Length (cm) 1.5 cm 06/12/24 1001   Wound Width (cm) 1.9 cm 06/12/24 1001   Wound Depth (cm) 0.2 cm 06/12/24 1001   Wound Surface Area (cm^2) 2.85 cm^2 06/12/24 1001   Change in Wound Size % (l*w) -26.67 06/12/24 1001   Wound Volume (cm^3) 0.57 cm^3 06/12/24 1001   Wound Healing % -27 06/12/24 1001   Post-Procedure Length (cm) 1.5 cm 06/12/24 1041   Post-Procedure Width (cm) 1.9 cm 06/12/24 1041   Post-Procedure Depth (cm) 0.3 cm 06/12/24 1041   Post-Procedure Surface Area (cm^2) 2.85 cm^2 06/12/24 1041   Post-Procedure Volume (cm^3) 0.855 cm^3 06/12/24 1041   Wound Assessment Pink/red;Fibrin 06/12/24 1001   Drainage Amount Moderate (25-50%) 06/12/24 1001   Drainage Description Yellow;Serosanguinous 06/12/24 1001   Odor None 06/12/24 1001   Kely-wound Assessment Dry/flaky 06/12/24 1001   Number of days: 72       Wound 05/10/24 Sternum Left (Active)   Number of days: 32          Procedure Note  Indications:  Based on my examination of this patient's wound(s)/ulcer(s) today, debridement is required to promote healing and evaluate the wound base.    Performed by: Pedrito Bear DPM    Consent obtained:  Yes    Time out taken:  Yes    Pain Control: Anesthetic  Anesthetic: 4% Lidocaine Liquid Topical     Debridement:Excisional Debridement    Using curette the wound(s)/ulcer(s) was/were sharply debrided down through and including the removal of epidermis, dermis, and subcutaneous tissue.        Devitalized Tissue Debrided:  fibrin, slough, and necrotic/eschar to stimulate bleeding to promote healing, post debridement good bleeding base and wound edges noted    Wound/Ulcer #: 1    Percent of Wound/Ulcer Debrided: 100%    Total Surface Area Debrided: 2.8  sq cm     Estimated Blood Loss:  None  Hemostasis Achieved:  by pressure    Procedural Pain:  4  / 10   Post Procedural Pain:  4 / 10     Response to treatment:  Well tolerated by patient.     Plan:   Treatment Note please see attached Discharge

## 2024-06-21 ENCOUNTER — TRANSCRIBE ORDERS (OUTPATIENT)
Dept: ADMINISTRATIVE | Age: 69
End: 2024-06-21

## 2024-06-21 DIAGNOSIS — K09.0 DEVELOPMENTAL ODONTOGENIC CYSTS: Primary | ICD-10-CM

## 2024-06-22 ENCOUNTER — HOSPITAL ENCOUNTER (EMERGENCY)
Age: 69
Discharge: ANOTHER HOSPITAL | End: 2024-06-22
Attending: STUDENT IN AN ORGANIZED HEALTH CARE EDUCATION/TRAINING PROGRAM
Payer: MEDICARE

## 2024-06-22 ENCOUNTER — HOSPITAL ENCOUNTER (INPATIENT)
Age: 69
LOS: 5 days | Discharge: HOME | DRG: 690 | End: 2024-06-27
Attending: INTERNAL MEDICINE | Admitting: INTERNAL MEDICINE
Payer: MEDICARE

## 2024-06-22 ENCOUNTER — APPOINTMENT (OUTPATIENT)
Dept: GENERAL RADIOLOGY | Age: 69
End: 2024-06-22
Payer: MEDICARE

## 2024-06-22 ENCOUNTER — APPOINTMENT (OUTPATIENT)
Dept: CT IMAGING | Age: 69
End: 2024-06-22
Payer: MEDICARE

## 2024-06-22 VITALS
HEART RATE: 86 BPM | OXYGEN SATURATION: 96 % | TEMPERATURE: 97.7 F | RESPIRATION RATE: 20 BRPM | DIASTOLIC BLOOD PRESSURE: 62 MMHG | SYSTOLIC BLOOD PRESSURE: 142 MMHG

## 2024-06-22 DIAGNOSIS — N39.0 UTI (URINARY TRACT INFECTION), BACTERIAL: ICD-10-CM

## 2024-06-22 DIAGNOSIS — J98.4 PNEUMONITIS: ICD-10-CM

## 2024-06-22 DIAGNOSIS — A49.9 UTI (URINARY TRACT INFECTION), BACTERIAL: ICD-10-CM

## 2024-06-22 DIAGNOSIS — R06.00 DYSPNEA AND RESPIRATORY ABNORMALITIES: ICD-10-CM

## 2024-06-22 DIAGNOSIS — R06.89 DYSPNEA AND RESPIRATORY ABNORMALITIES: ICD-10-CM

## 2024-06-22 DIAGNOSIS — N18.9 ACUTE KIDNEY INJURY SUPERIMPOSED ON CKD (HCC): ICD-10-CM

## 2024-06-22 DIAGNOSIS — E86.0 MILD DEHYDRATION: ICD-10-CM

## 2024-06-22 DIAGNOSIS — L97.212 LOWER LIMB ULCER, CALF, RIGHT, WITH FAT LAYER EXPOSED (HCC): Primary | ICD-10-CM

## 2024-06-22 DIAGNOSIS — N17.9 ACUTE KIDNEY INJURY SUPERIMPOSED ON CKD (HCC): ICD-10-CM

## 2024-06-22 DIAGNOSIS — E87.5 HYPERKALEMIA: Primary | ICD-10-CM

## 2024-06-22 LAB
ALBUMIN SERPL-MCNC: 3.6 G/DL (ref 3.5–5.2)
ALP SERPL-CCNC: 70 U/L (ref 35–104)
ALT SERPL-CCNC: 17 U/L (ref 0–32)
ANION GAP SERPL CALCULATED.3IONS-SCNC: 10 MMOL/L (ref 7–16)
AST SERPL-CCNC: 14 U/L (ref 0–31)
BACTERIA URNS QL MICRO: ABNORMAL
BASOPHILS # BLD: 0.02 K/UL (ref 0–0.2)
BASOPHILS NFR BLD: 0 % (ref 0–2)
BILIRUB SERPL-MCNC: 0.2 MG/DL (ref 0–1.2)
BILIRUB UR QL STRIP: NEGATIVE
BNP SERPL-MCNC: 462 PG/ML (ref 0–125)
BUN SERPL-MCNC: 29 MG/DL (ref 6–23)
CALCIUM SERPL-MCNC: 8.7 MG/DL (ref 8.6–10.2)
CHLORIDE SERPL-SCNC: 107 MMOL/L (ref 98–107)
CLARITY UR: CLEAR
CO2 SERPL-SCNC: 22 MMOL/L (ref 22–29)
COLOR UR: YELLOW
CREAT SERPL-MCNC: 1.9 MG/DL (ref 0.5–1)
EOSINOPHIL # BLD: 0.18 K/UL (ref 0.05–0.5)
EOSINOPHILS RELATIVE PERCENT: 3 % (ref 0–6)
ERYTHROCYTE [DISTWIDTH] IN BLOOD BY AUTOMATED COUNT: 12.9 % (ref 11.5–15)
GFR, ESTIMATED: 29 ML/MIN/1.73M2
GLUCOSE SERPL-MCNC: 102 MG/DL (ref 74–99)
GLUCOSE UR STRIP-MCNC: NEGATIVE MG/DL
HCT VFR BLD AUTO: 33.2 % (ref 34–48)
HGB BLD-MCNC: 10.6 G/DL (ref 11.5–15.5)
HGB UR QL STRIP.AUTO: NEGATIVE
IMM GRANULOCYTES # BLD AUTO: <0.03 K/UL (ref 0–0.58)
IMM GRANULOCYTES NFR BLD: 0 % (ref 0–5)
INR PPP: 1.1
KETONES UR STRIP-MCNC: NEGATIVE MG/DL
LEUKOCYTE ESTERASE UR QL STRIP: ABNORMAL
LYMPHOCYTES NFR BLD: 0.96 K/UL (ref 1.5–4)
LYMPHOCYTES RELATIVE PERCENT: 16 % (ref 20–42)
MAGNESIUM SERPL-MCNC: 2 MG/DL (ref 1.6–2.6)
MCH RBC QN AUTO: 32.4 PG (ref 26–35)
MCHC RBC AUTO-ENTMCNC: 31.9 G/DL (ref 32–34.5)
MCV RBC AUTO: 101.5 FL (ref 80–99.9)
MONOCYTES NFR BLD: 0.26 K/UL (ref 0.1–0.95)
MONOCYTES NFR BLD: 4 % (ref 2–12)
NEUTROPHILS NFR BLD: 77 % (ref 43–80)
NEUTS SEG NFR BLD: 4.75 K/UL (ref 1.8–7.3)
NITRITE UR QL STRIP: POSITIVE
PH UR STRIP: 5.5 [PH] (ref 5–9)
PLATELET # BLD AUTO: 269 K/UL (ref 130–450)
PMV BLD AUTO: 10 FL (ref 7–12)
POTASSIUM SERPL-SCNC: 5.6 MMOL/L (ref 3.5–5)
POTASSIUM SERPL-SCNC: 5.7 MMOL/L (ref 3.5–5)
PROT SERPL-MCNC: 6.2 G/DL (ref 6.4–8.3)
PROT UR STRIP-MCNC: 30 MG/DL
PROTHROMBIN TIME: 12.1 SEC (ref 9.3–12.4)
RBC # BLD AUTO: 3.27 M/UL (ref 3.5–5.5)
RBC #/AREA URNS HPF: ABNORMAL /HPF
SODIUM SERPL-SCNC: 139 MMOL/L (ref 132–146)
SP GR UR STRIP: 1.01 (ref 1–1.03)
TROPONIN I SERPL HS-MCNC: 17 NG/L (ref 0–9)
TROPONIN I SERPL HS-MCNC: 18 NG/L (ref 0–9)
UROBILINOGEN UR STRIP-ACNC: 1 EU/DL (ref 0–1)
WBC #/AREA URNS HPF: ABNORMAL /HPF
WBC OTHER # BLD: 6.2 K/UL (ref 4.5–11.5)

## 2024-06-22 PROCEDURE — 80053 COMPREHEN METABOLIC PANEL: CPT

## 2024-06-22 PROCEDURE — 2500000003 HC RX 250 WO HCPCS: Performed by: STUDENT IN AN ORGANIZED HEALTH CARE EDUCATION/TRAINING PROGRAM

## 2024-06-22 PROCEDURE — 84132 ASSAY OF SERUM POTASSIUM: CPT

## 2024-06-22 PROCEDURE — 96361 HYDRATE IV INFUSION ADD-ON: CPT

## 2024-06-22 PROCEDURE — 81001 URINALYSIS AUTO W/SCOPE: CPT

## 2024-06-22 PROCEDURE — 87086 URINE CULTURE/COLONY COUNT: CPT

## 2024-06-22 PROCEDURE — 96375 TX/PRO/DX INJ NEW DRUG ADDON: CPT

## 2024-06-22 PROCEDURE — 6370000000 HC RX 637 (ALT 250 FOR IP): Performed by: STUDENT IN AN ORGANIZED HEALTH CARE EDUCATION/TRAINING PROGRAM

## 2024-06-22 PROCEDURE — 2060000000 HC ICU INTERMEDIATE R&B

## 2024-06-22 PROCEDURE — 96374 THER/PROPH/DIAG INJ IV PUSH: CPT

## 2024-06-22 PROCEDURE — 2580000003 HC RX 258: Performed by: STUDENT IN AN ORGANIZED HEALTH CARE EDUCATION/TRAINING PROGRAM

## 2024-06-22 PROCEDURE — 84484 ASSAY OF TROPONIN QUANT: CPT

## 2024-06-22 PROCEDURE — 87088 URINE BACTERIA CULTURE: CPT

## 2024-06-22 PROCEDURE — 73130 X-RAY EXAM OF HAND: CPT

## 2024-06-22 PROCEDURE — 85025 COMPLETE CBC W/AUTO DIFF WBC: CPT

## 2024-06-22 PROCEDURE — 73562 X-RAY EXAM OF KNEE 3: CPT

## 2024-06-22 PROCEDURE — APPSS45 APP SPLIT SHARED TIME 31-45 MINUTES

## 2024-06-22 PROCEDURE — 99285 EMERGENCY DEPT VISIT HI MDM: CPT

## 2024-06-22 PROCEDURE — 6360000002 HC RX W HCPCS: Performed by: STUDENT IN AN ORGANIZED HEALTH CARE EDUCATION/TRAINING PROGRAM

## 2024-06-22 PROCEDURE — 93005 ELECTROCARDIOGRAM TRACING: CPT | Performed by: STUDENT IN AN ORGANIZED HEALTH CARE EDUCATION/TRAINING PROGRAM

## 2024-06-22 PROCEDURE — 71250 CT THORAX DX C-: CPT

## 2024-06-22 PROCEDURE — 83880 ASSAY OF NATRIURETIC PEPTIDE: CPT

## 2024-06-22 PROCEDURE — 96360 HYDRATION IV INFUSION INIT: CPT

## 2024-06-22 PROCEDURE — 71045 X-RAY EXAM CHEST 1 VIEW: CPT

## 2024-06-22 PROCEDURE — 83735 ASSAY OF MAGNESIUM: CPT

## 2024-06-22 PROCEDURE — 85610 PROTHROMBIN TIME: CPT

## 2024-06-22 RX ORDER — 0.9 % SODIUM CHLORIDE 0.9 %
1000 INTRAVENOUS SOLUTION INTRAVENOUS ONCE
Status: COMPLETED | OUTPATIENT
Start: 2024-06-22 | End: 2024-06-22

## 2024-06-22 RX ORDER — GUAIFENESIN/DEXTROMETHORPHAN 100-10MG/5
10 SYRUP ORAL ONCE
Status: COMPLETED | OUTPATIENT
Start: 2024-06-22 | End: 2024-06-22

## 2024-06-22 RX ORDER — DEXTROSE MONOHYDRATE 100 MG/ML
INJECTION, SOLUTION INTRAVENOUS CONTINUOUS PRN
Status: DISCONTINUED | OUTPATIENT
Start: 2024-06-22 | End: 2024-06-22 | Stop reason: HOSPADM

## 2024-06-22 RX ORDER — IPRATROPIUM BROMIDE AND ALBUTEROL SULFATE 2.5; .5 MG/3ML; MG/3ML
3 SOLUTION RESPIRATORY (INHALATION) ONCE
Status: COMPLETED | OUTPATIENT
Start: 2024-06-22 | End: 2024-06-22

## 2024-06-22 RX ORDER — BENZONATATE 100 MG/1
100 CAPSULE ORAL ONCE
Status: COMPLETED | OUTPATIENT
Start: 2024-06-22 | End: 2024-06-22

## 2024-06-22 RX ORDER — GLUCAGON 1 MG/ML
1 KIT INJECTION PRN
Status: DISCONTINUED | OUTPATIENT
Start: 2024-06-22 | End: 2024-06-22 | Stop reason: HOSPADM

## 2024-06-22 RX ADMIN — SODIUM CHLORIDE 1000 ML: 9 INJECTION, SOLUTION INTRAVENOUS at 15:49

## 2024-06-22 RX ADMIN — WATER 125 MG: 1 INJECTION INTRAMUSCULAR; INTRAVENOUS; SUBCUTANEOUS at 15:50

## 2024-06-22 RX ADMIN — IPRATROPIUM BROMIDE AND ALBUTEROL SULFATE 1 DOSE: .5; 3 SOLUTION RESPIRATORY (INHALATION) at 14:40

## 2024-06-22 RX ADMIN — CALCIUM GLUCONATE 1000 MG: 98 INJECTION, SOLUTION INTRAVENOUS at 17:00

## 2024-06-22 RX ADMIN — SODIUM ZIRCONIUM CYCLOSILICATE 10 G: 10 POWDER, FOR SUSPENSION ORAL at 17:02

## 2024-06-22 RX ADMIN — BENZONATATE 100 MG: 100 CAPSULE ORAL at 14:40

## 2024-06-22 RX ADMIN — SODIUM BICARBONATE 50 MEQ: 84 INJECTION INTRAVENOUS at 17:02

## 2024-06-22 RX ADMIN — WATER 2000 MG: 1 INJECTION INTRAMUSCULAR; INTRAVENOUS; SUBCUTANEOUS at 13:19

## 2024-06-22 RX ADMIN — DEXTROMETHORPHAN HYDROBROMIDE, GUAIFENESIN 10 ML: 10; 100 LIQUID ORAL at 14:39

## 2024-06-22 ASSESSMENT — ENCOUNTER SYMPTOMS
VOMITING: 0
CONSTIPATION: 0
ABDOMINAL PAIN: 0
DIARRHEA: 0
SHORTNESS OF BREATH: 1
NAUSEA: 0
COUGH: 0

## 2024-06-22 ASSESSMENT — PAIN SCALES - GENERAL
PAINLEVEL_OUTOF10: 8
PAINLEVEL_OUTOF10: 9

## 2024-06-22 ASSESSMENT — LIFESTYLE VARIABLES
HOW OFTEN DO YOU HAVE A DRINK CONTAINING ALCOHOL: NEVER
HOW MANY STANDARD DRINKS CONTAINING ALCOHOL DO YOU HAVE ON A TYPICAL DAY: PATIENT DOES NOT DRINK

## 2024-06-22 ASSESSMENT — PAIN DESCRIPTION - LOCATION: LOCATION: GENERALIZED

## 2024-06-22 ASSESSMENT — PAIN DESCRIPTION - FREQUENCY: FREQUENCY: CONTINUOUS

## 2024-06-22 ASSESSMENT — PAIN DESCRIPTION - ONSET: ONSET: ON-GOING

## 2024-06-22 ASSESSMENT — PAIN DESCRIPTION - DESCRIPTORS: DESCRIPTORS: ACHING;SORE;DISCOMFORT

## 2024-06-22 ASSESSMENT — PAIN - FUNCTIONAL ASSESSMENT: PAIN_FUNCTIONAL_ASSESSMENT: 0-10

## 2024-06-22 ASSESSMENT — PAIN DESCRIPTION - PAIN TYPE: TYPE: ACUTE PAIN

## 2024-06-22 NOTE — ED NOTES
Pt is refusing to be stuck again and wants to eat her food. She wants to wait until she is transferred. Doc was made aware. Another nurse went in and patient is still refusing to be stuck.

## 2024-06-22 NOTE — ED NOTES
Report called to the floor at [facility] to [phone]. No further questions at this time and I did make the nurse aware that the Dr. Cheatham is attempting to get an ultrasound.

## 2024-06-22 NOTE — ED NOTES
Doc is aware that the patient has not gotten all of the Hyperkalemic protocol due to loss of IV access.

## 2024-06-22 NOTE — ED PROVIDER NOTES
(01/08/2016), Glaucoma, open angle (02/01/2016), Hepatic encephalopathy (HCC) (02/07/2016), Hiatal hernia, History of blood transfusion, Hyperlipidemia, Hyperplastic colon polyp, Hypertension, Incontinence, Liver mass, Lower limb ulcer, calf, right, limited to breakdown of skin (HCC) (03/06/2024), Lower limb ulcer, calf, right, with fat layer exposed (HCC) (04/01/2024), Morbid obesity ([facility]), Movement disorder, Myocardial infarction (HCC) (2011), O2 dependent (09/16/2021), Osteoarthritis, generalized, Pain in both lower legs (10/11/2017), Peripheral vascular disease ([facility]), Pneumonia (01/26/2014), Pulmonary edema, PVD (peripheral vascular disease) with claudication ([facility]) (08/30/2017), Sleep apnea, Status post peripheral artery angioplasty (10/31/2019), Tinea pedis of both feet (04/15/2024), Tobacco abuse, Tobacco abuse (10/11/2017), Tobacco dependence (06/30/2022), Tubular adenoma polyp of rectum, Urinary tract infection due to ESBL Klebsiella (03/08/2017), and Ventral hernia.     EMERGENCY DEPARTMENT COURSE    Vitals:    Vitals:    06/22/24 1131 06/22/24 1600 06/22/24 1916   BP: (!) 120/57 (!) 142/62    Pulse: 80 (!) 114 86   Resp: 18 20 20   Temp: 97.7 °F (36.5 °C)     TempSrc: Oral     SpO2: 92% 96%        Patient was given the following medications:  Medications   insulin regular (HUMULIN R;NOVOLIN R) injection 5 Units (5 Units IntraVENous Not Given 6/22/24 1703)     And   dextrose bolus 10% 250 mL (250 mLs IntraVENous Not Given 6/22/24 1703)   glucose chewable tablet 16 g (has no administration in time range)   dextrose bolus 10% 125 mL (has no administration in time range)     Or   dextrose bolus 10% 250 mL (has no administration in time range)   glucagon injection 1 mg (has no administration in time range)   dextrose 10 % infusion (has no administration in time range)   doxycycline (VIBRAMYCIN) 100 mg in sodium chloride 0.9 % 100 mL IVPB ([id number]) (has no administration in time range)   cefTRIAXone (ROCEPHIN)  stiffens up on her sometimes, and said that she \"bumped it\" regarding her right knee.  Imaging was reviewed; chest x-ray was difficult to evaluate due to body habitus and breast tissue; did not show obvious abnormalities but a follow-up CT chest without contrast showed bibasilar pneumonitis.  Patient was treated with doxycycline and Rocephin.  She also had significant UTI findings which was treated with Rocephin.  Patient's initial potassium was 5.6, felt possibly due to mild hemolysis, so repeat was performed but was indeed true at 5.7.  She was given IV calcium, bicarb, fluids, insulin and dextrose.  Repeat potassium was not obtained prior to the patient being transferred.  Patient had difficulty with IV access during this encounter; I was able to place a peripheral IV at her left upper extremity right before she was transferred.  Ultimate decision was to admit this patient for further evaluation and management of pneumonitis, UTI, and hyperkalemia; this is in context of CKD which is slightly worsened today.  Patient is also on Entresto.  Results and plan for admission/transfer were discussed with the patient; she prefers [facility].  She voiced understanding and is amenable.  Medicine consulted for admission, discussed case, patient is accepted.      While not exhaustive, the following diagnoses and their severity were considered: PNA, dehydration, electrolyte abnormality, UTI.      Independent interpretation of Laboratory tests by Amna Cheatham DO: Please see ED course for documentation of interpreted abnormal and/or relevant lab results.    Independent interpretation of Radiology tests by Amna Cheatham DO: as above.         Non-plain film images such as CT, Ultrasound and MRI are read by the radiologist. Plain radiographic images are visualized and preliminarily interpreted by the ED Provider with the above-noted findings. Interpretation per the Radiologist below, if available at the time of this

## 2024-06-23 ENCOUNTER — APPOINTMENT (OUTPATIENT)
Dept: CT IMAGING | Age: 69
DRG: 690 | End: 2024-06-23
Attending: INTERNAL MEDICINE
Payer: MEDICARE

## 2024-06-23 PROBLEM — R11.2 NAUSEA VOMITING AND DIARRHEA: Status: ACTIVE | Noted: 2024-06-23

## 2024-06-23 PROBLEM — R10.84 GENERALIZED ABDOMINAL PAIN: Status: ACTIVE | Noted: 2024-06-23

## 2024-06-23 PROBLEM — R19.7 NAUSEA VOMITING AND DIARRHEA: Status: ACTIVE | Noted: 2024-06-23

## 2024-06-23 LAB
ANION GAP SERPL CALCULATED.3IONS-SCNC: 10 MMOL/L (ref 7–16)
ANION GAP SERPL CALCULATED.3IONS-SCNC: 10 MMOL/L (ref 7–16)
B PARAP IS1001 DNA NPH QL NAA+NON-PROBE: NOT DETECTED
B PERT DNA SPEC QL NAA+PROBE: NOT DETECTED
BUN SERPL-MCNC: 29 MG/DL (ref 6–23)
BUN SERPL-MCNC: 30 MG/DL (ref 6–23)
C PNEUM DNA NPH QL NAA+NON-PROBE: NOT DETECTED
CALCIUM SERPL-MCNC: 8.8 MG/DL (ref 8.6–10.2)
CALCIUM SERPL-MCNC: 8.9 MG/DL (ref 8.6–10.2)
CHLORIDE SERPL-SCNC: 106 MMOL/L (ref 98–107)
CHLORIDE SERPL-SCNC: 98 MMOL/L (ref 98–107)
CO2 SERPL-SCNC: 22 MMOL/L (ref 22–29)
CO2 SERPL-SCNC: 25 MMOL/L (ref 22–29)
CREAT SERPL-MCNC: 1.6 MG/DL (ref 0.5–1)
CREAT SERPL-MCNC: 1.7 MG/DL (ref 0.5–1)
CREAT UR-MCNC: 92.7 MG/DL (ref 29–226)
DATE, STOOL #1: NORMAL
FLUAV RNA NPH QL NAA+NON-PROBE: NOT DETECTED
FLUBV RNA NPH QL NAA+NON-PROBE: NOT DETECTED
GFR, ESTIMATED: 33 ML/MIN/1.73M2
GFR, ESTIMATED: 34 ML/MIN/1.73M2
GLUCOSE BLD-MCNC: 145 MG/DL (ref 74–99)
GLUCOSE BLD-MCNC: 147 MG/DL (ref 74–99)
GLUCOSE BLD-MCNC: 177 MG/DL (ref 74–99)
GLUCOSE BLD-MCNC: 251 MG/DL (ref 74–99)
GLUCOSE SERPL-MCNC: 151 MG/DL (ref 74–99)
GLUCOSE SERPL-MCNC: 230 MG/DL (ref 74–99)
HADV DNA NPH QL NAA+NON-PROBE: NOT DETECTED
HBA1C MFR BLD: 5.1 % (ref 4–5.6)
HCOV 229E RNA NPH QL NAA+NON-PROBE: NOT DETECTED
HCOV HKU1 RNA NPH QL NAA+NON-PROBE: NOT DETECTED
HCOV NL63 RNA NPH QL NAA+NON-PROBE: NOT DETECTED
HCOV OC43 RNA NPH QL NAA+NON-PROBE: NOT DETECTED
HEMOCCULT SP1 STL QL: NEGATIVE
HMPV RNA NPH QL NAA+NON-PROBE: NOT DETECTED
HPIV1 RNA NPH QL NAA+NON-PROBE: NOT DETECTED
HPIV2 RNA NPH QL NAA+NON-PROBE: NOT DETECTED
HPIV3 RNA NPH QL NAA+NON-PROBE: NOT DETECTED
HPIV4 RNA NPH QL NAA+NON-PROBE: NOT DETECTED
M PNEUMO DNA NPH QL NAA+NON-PROBE: NOT DETECTED
MICROORGANISM SPEC CULT: ABNORMAL
POTASSIUM SERPL-SCNC: 4.7 MMOL/L (ref 3.5–5)
POTASSIUM SERPL-SCNC: 5.6 MMOL/L (ref 3.5–5)
PROCALCITONIN SERPL-MCNC: 0.03 NG/ML (ref 0–0.08)
RSV RNA NPH QL NAA+NON-PROBE: NOT DETECTED
RV+EV RNA NPH QL NAA+NON-PROBE: NOT DETECTED
SARS-COV-2 RNA NPH QL NAA+NON-PROBE: NOT DETECTED
SERVICE CMNT-IMP: ABNORMAL
SODIUM SERPL-SCNC: 133 MMOL/L (ref 132–146)
SODIUM SERPL-SCNC: 138 MMOL/L (ref 132–146)
SPECIMEN DESCRIPTION: ABNORMAL
SPECIMEN DESCRIPTION: NORMAL
TIME, STOOL #1: 1000
TOTAL PROTEIN, URINE: 54 MG/DL (ref 0–12)
URINE TOTAL PROTEIN CREATININE RATIO: 0.58 (ref 0–0.2)
UUN UR-MCNC: 691 MG/DL (ref 800–1666)

## 2024-06-23 PROCEDURE — 87449 NOS EACH ORGANISM AG IA: CPT

## 2024-06-23 PROCEDURE — 6370000000 HC RX 637 (ALT 250 FOR IP)

## 2024-06-23 PROCEDURE — 87046 STOOL CULTR AEROBIC BACT EA: CPT

## 2024-06-23 PROCEDURE — 94640 AIRWAY INHALATION TREATMENT: CPT

## 2024-06-23 PROCEDURE — 87425 ROTAVIRUS AG IA: CPT

## 2024-06-23 PROCEDURE — 36415 COLL VENOUS BLD VENIPUNCTURE: CPT

## 2024-06-23 PROCEDURE — 82270 OCCULT BLOOD FECES: CPT

## 2024-06-23 PROCEDURE — 2580000003 HC RX 258

## 2024-06-23 PROCEDURE — 94660 CPAP INITIATION&MGMT: CPT

## 2024-06-23 PROCEDURE — 87324 CLOSTRIDIUM AG IA: CPT

## 2024-06-23 PROCEDURE — 2580000003 HC RX 258: Performed by: STUDENT IN AN ORGANIZED HEALTH CARE EDUCATION/TRAINING PROGRAM

## 2024-06-23 PROCEDURE — 6360000002 HC RX W HCPCS: Performed by: STUDENT IN AN ORGANIZED HEALTH CARE EDUCATION/TRAINING PROGRAM

## 2024-06-23 PROCEDURE — 99222 1ST HOSP IP/OBS MODERATE 55: CPT | Performed by: STUDENT IN AN ORGANIZED HEALTH CARE EDUCATION/TRAINING PROGRAM

## 2024-06-23 PROCEDURE — 2060000000 HC ICU INTERMEDIATE R&B

## 2024-06-23 PROCEDURE — 2500000003 HC RX 250 WO HCPCS

## 2024-06-23 PROCEDURE — 84145 PROCALCITONIN (PCT): CPT

## 2024-06-23 PROCEDURE — 82570 ASSAY OF URINE CREATININE: CPT

## 2024-06-23 PROCEDURE — 87086 URINE CULTURE/COLONY COUNT: CPT

## 2024-06-23 PROCEDURE — 0202U NFCT DS 22 TRGT SARS-COV-2: CPT

## 2024-06-23 PROCEDURE — 99233 SBSQ HOSP IP/OBS HIGH 50: CPT | Performed by: INTERNAL MEDICINE

## 2024-06-23 PROCEDURE — 80048 BASIC METABOLIC PNL TOTAL CA: CPT

## 2024-06-23 PROCEDURE — 82962 GLUCOSE BLOOD TEST: CPT

## 2024-06-23 PROCEDURE — 87427 SHIGA-LIKE TOXIN AG IA: CPT

## 2024-06-23 PROCEDURE — 6360000002 HC RX W HCPCS

## 2024-06-23 PROCEDURE — 6370000000 HC RX 637 (ALT 250 FOR IP): Performed by: INTERNAL MEDICINE

## 2024-06-23 PROCEDURE — 84540 ASSAY OF URINE/UREA-N: CPT

## 2024-06-23 PROCEDURE — 83993 ASSAY FOR CALPROTECTIN FECAL: CPT

## 2024-06-23 PROCEDURE — 74176 CT ABD & PELVIS W/O CONTRAST: CPT

## 2024-06-23 PROCEDURE — 84156 ASSAY OF PROTEIN URINE: CPT

## 2024-06-23 PROCEDURE — 87045 FECES CULTURE AEROBIC BACT: CPT

## 2024-06-23 PROCEDURE — 83036 HEMOGLOBIN GLYCOSYLATED A1C: CPT

## 2024-06-23 RX ORDER — ALBUTEROL SULFATE 2.5 MG/3ML
10 SOLUTION RESPIRATORY (INHALATION) ONCE
Status: COMPLETED | OUTPATIENT
Start: 2024-06-23 | End: 2024-06-23

## 2024-06-23 RX ORDER — CARVEDILOL 6.25 MG/1
3.12 TABLET ORAL 2 TIMES DAILY WITH MEALS
Status: DISCONTINUED | OUTPATIENT
Start: 2024-06-23 | End: 2024-06-27 | Stop reason: HOSPADM

## 2024-06-23 RX ORDER — ACETAMINOPHEN 650 MG/1
650 SUPPOSITORY RECTAL EVERY 6 HOURS PRN
Status: DISCONTINUED | OUTPATIENT
Start: 2024-06-23 | End: 2024-06-27 | Stop reason: HOSPADM

## 2024-06-23 RX ORDER — ENOXAPARIN SODIUM 100 MG/ML
30 INJECTION SUBCUTANEOUS DAILY
Status: DISCONTINUED | OUTPATIENT
Start: 2024-06-23 | End: 2024-06-24 | Stop reason: DRUGHIGH

## 2024-06-23 RX ORDER — HYDROCODONE BITARTRATE AND ACETAMINOPHEN 5; 325 MG/1; MG/1
1 TABLET ORAL EVERY 6 HOURS PRN
Status: CANCELLED | OUTPATIENT
Start: 2024-06-23

## 2024-06-23 RX ORDER — BUDESONIDE AND FORMOTEROL FUMARATE DIHYDRATE 160; 4.5 UG/1; UG/1
2 AEROSOL RESPIRATORY (INHALATION) 2 TIMES DAILY
Status: DISCONTINUED | OUTPATIENT
Start: 2024-06-23 | End: 2024-06-23

## 2024-06-23 RX ORDER — DULOXETIN HYDROCHLORIDE 60 MG/1
60 CAPSULE, DELAYED RELEASE ORAL DAILY
Status: DISCONTINUED | OUTPATIENT
Start: 2024-06-23 | End: 2024-06-27 | Stop reason: HOSPADM

## 2024-06-23 RX ORDER — OXYBUTYNIN CHLORIDE 5 MG/1
15 TABLET, EXTENDED RELEASE ORAL DAILY
Status: DISCONTINUED | OUTPATIENT
Start: 2024-06-23 | End: 2024-06-27 | Stop reason: HOSPADM

## 2024-06-23 RX ORDER — INSULIN LISPRO 100 [IU]/ML
0-4 INJECTION, SOLUTION INTRAVENOUS; SUBCUTANEOUS NIGHTLY
Status: DISCONTINUED | OUTPATIENT
Start: 2024-06-23 | End: 2024-06-27 | Stop reason: HOSPADM

## 2024-06-23 RX ORDER — ASPIRIN 81 MG/1
81 TABLET, CHEWABLE ORAL DAILY
Status: DISCONTINUED | OUTPATIENT
Start: 2024-06-23 | End: 2024-06-27 | Stop reason: HOSPADM

## 2024-06-23 RX ORDER — PANTOPRAZOLE SODIUM 40 MG/1
40 TABLET, DELAYED RELEASE ORAL
Status: DISCONTINUED | OUTPATIENT
Start: 2024-06-23 | End: 2024-06-27 | Stop reason: HOSPADM

## 2024-06-23 RX ORDER — ARFORMOTEROL TARTRATE 15 UG/2ML
15 SOLUTION RESPIRATORY (INHALATION)
Status: DISCONTINUED | OUTPATIENT
Start: 2024-06-23 | End: 2024-06-27 | Stop reason: HOSPADM

## 2024-06-23 RX ORDER — GUAIFENESIN/DEXTROMETHORPHAN 100-10MG/5
5 SYRUP ORAL EVERY 4 HOURS PRN
Status: DISCONTINUED | OUTPATIENT
Start: 2024-06-23 | End: 2024-06-27 | Stop reason: HOSPADM

## 2024-06-23 RX ORDER — INSULIN LISPRO 100 [IU]/ML
0-8 INJECTION, SOLUTION INTRAVENOUS; SUBCUTANEOUS
Status: DISCONTINUED | OUTPATIENT
Start: 2024-06-23 | End: 2024-06-27 | Stop reason: HOSPADM

## 2024-06-23 RX ORDER — SODIUM CHLORIDE 9 MG/ML
INJECTION, SOLUTION INTRAVENOUS PRN
Status: DISCONTINUED | OUTPATIENT
Start: 2024-06-23 | End: 2024-06-27 | Stop reason: HOSPADM

## 2024-06-23 RX ORDER — GLUCAGON 1 MG/ML
1 KIT INJECTION PRN
Status: DISCONTINUED | OUTPATIENT
Start: 2024-06-23 | End: 2024-06-27 | Stop reason: HOSPADM

## 2024-06-23 RX ORDER — MINOXIDIL 2.5 MG/1
2.5 TABLET ORAL 2 TIMES DAILY
Status: DISCONTINUED | OUTPATIENT
Start: 2024-06-23 | End: 2024-06-27 | Stop reason: HOSPADM

## 2024-06-23 RX ORDER — FLUTICASONE PROPIONATE 50 MCG
2 SPRAY, SUSPENSION (ML) NASAL DAILY
Status: DISCONTINUED | OUTPATIENT
Start: 2024-06-23 | End: 2024-06-27 | Stop reason: HOSPADM

## 2024-06-23 RX ORDER — SODIUM CHLORIDE 0.9 % (FLUSH) 0.9 %
5-40 SYRINGE (ML) INJECTION PRN
Status: DISCONTINUED | OUTPATIENT
Start: 2024-06-23 | End: 2024-06-27 | Stop reason: HOSPADM

## 2024-06-23 RX ORDER — CALCIUM GLUCONATE 20 MG/ML
1000 INJECTION, SOLUTION INTRAVENOUS ONCE
Status: DISCONTINUED | OUTPATIENT
Start: 2024-06-23 | End: 2024-06-27 | Stop reason: HOSPADM

## 2024-06-23 RX ORDER — INSULIN GLARGINE 100 [IU]/ML
6 INJECTION, SOLUTION SUBCUTANEOUS NIGHTLY
Status: DISCONTINUED | OUTPATIENT
Start: 2024-06-23 | End: 2024-06-27 | Stop reason: HOSPADM

## 2024-06-23 RX ORDER — POLYETHYLENE GLYCOL 3350 17 G/17G
17 POWDER, FOR SOLUTION ORAL DAILY PRN
Status: DISCONTINUED | OUTPATIENT
Start: 2024-06-23 | End: 2024-06-27 | Stop reason: HOSPADM

## 2024-06-23 RX ORDER — BUDESONIDE 0.5 MG/2ML
0.5 INHALANT ORAL
Status: DISCONTINUED | OUTPATIENT
Start: 2024-06-23 | End: 2024-06-27 | Stop reason: HOSPADM

## 2024-06-23 RX ORDER — DEXTROSE MONOHYDRATE 100 MG/ML
INJECTION, SOLUTION INTRAVENOUS CONTINUOUS PRN
Status: DISCONTINUED | OUTPATIENT
Start: 2024-06-23 | End: 2024-06-27 | Stop reason: HOSPADM

## 2024-06-23 RX ORDER — GABAPENTIN 100 MG/1
100 CAPSULE ORAL 2 TIMES DAILY
Status: DISCONTINUED | OUTPATIENT
Start: 2024-06-23 | End: 2024-06-27 | Stop reason: HOSPADM

## 2024-06-23 RX ORDER — ISOSORBIDE MONONITRATE 60 MG/1
60 TABLET, EXTENDED RELEASE ORAL DAILY
Status: DISCONTINUED | OUTPATIENT
Start: 2024-06-23 | End: 2024-06-27 | Stop reason: HOSPADM

## 2024-06-23 RX ORDER — HYDROCODONE BITARTRATE AND ACETAMINOPHEN 5; 325 MG/1; MG/1
1 TABLET ORAL EVERY 6 HOURS PRN
Status: DISCONTINUED | OUTPATIENT
Start: 2024-06-23 | End: 2024-06-27 | Stop reason: HOSPADM

## 2024-06-23 RX ORDER — SODIUM CHLORIDE 0.9 % (FLUSH) 0.9 %
5-40 SYRINGE (ML) INJECTION EVERY 12 HOURS SCHEDULED
Status: DISCONTINUED | OUTPATIENT
Start: 2024-06-23 | End: 2024-06-27 | Stop reason: HOSPADM

## 2024-06-23 RX ORDER — ACETAMINOPHEN 325 MG/1
650 TABLET ORAL EVERY 6 HOURS PRN
Status: DISCONTINUED | OUTPATIENT
Start: 2024-06-23 | End: 2024-06-27 | Stop reason: HOSPADM

## 2024-06-23 RX ORDER — DOCUSATE SODIUM 100 MG/1
200 CAPSULE, LIQUID FILLED ORAL NIGHTLY
Status: DISCONTINUED | OUTPATIENT
Start: 2024-06-23 | End: 2024-06-27 | Stop reason: HOSPADM

## 2024-06-23 RX ADMIN — PANTOPRAZOLE SODIUM 40 MG: 40 TABLET, DELAYED RELEASE ORAL at 08:59

## 2024-06-23 RX ADMIN — FLUTICASONE PROPIONATE 2 SPRAY: 50 SPRAY, METERED NASAL at 08:57

## 2024-06-23 RX ADMIN — HYDROCODONE BITARTRATE AND ACETAMINOPHEN 1 TABLET: 5; 325 TABLET ORAL at 15:48

## 2024-06-23 RX ADMIN — SODIUM ZIRCONIUM CYCLOSILICATE 10 G: 10 POWDER, FOR SUSPENSION ORAL at 20:46

## 2024-06-23 RX ADMIN — INSULIN LISPRO 4 UNITS: 100 INJECTION, SOLUTION INTRAVENOUS; SUBCUTANEOUS at 09:03

## 2024-06-23 RX ADMIN — CARVEDILOL 3.12 MG: 6.25 TABLET, FILM COATED ORAL at 08:58

## 2024-06-23 RX ADMIN — MICONAZOLE NITRATE: 20 POWDER TOPICAL at 08:57

## 2024-06-23 RX ADMIN — MINOXIDIL 2.5 MG: 2.5 TABLET ORAL at 20:45

## 2024-06-23 RX ADMIN — INSULIN GLARGINE 6 UNITS: 100 INJECTION, SOLUTION SUBCUTANEOUS at 20:46

## 2024-06-23 RX ADMIN — GUAIFENESIN AND DEXTROMETHORPHAN 5 ML: 100; 10 SYRUP ORAL at 20:49

## 2024-06-23 RX ADMIN — SODIUM CHLORIDE, PRESERVATIVE FREE 10 ML: 5 INJECTION INTRAVENOUS at 20:46

## 2024-06-23 RX ADMIN — GABAPENTIN 100 MG: 100 CAPSULE ORAL at 20:45

## 2024-06-23 RX ADMIN — ASPIRIN 81 MG: 81 TABLET, CHEWABLE ORAL at 08:58

## 2024-06-23 RX ADMIN — ARFORMOTEROL TARTRATE 15 MCG: 15 SOLUTION RESPIRATORY (INHALATION) at 21:29

## 2024-06-23 RX ADMIN — SODIUM ZIRCONIUM CYCLOSILICATE 10 G: 10 POWDER, FOR SUSPENSION ORAL at 07:32

## 2024-06-23 RX ADMIN — ENOXAPARIN SODIUM 30 MG: 100 INJECTION SUBCUTANEOUS at 08:58

## 2024-06-23 RX ADMIN — ALBUTEROL SULFATE 10 MG: 2.5 SOLUTION RESPIRATORY (INHALATION) at 06:47

## 2024-06-23 RX ADMIN — DULOXETINE HYDROCHLORIDE 60 MG: 60 CAPSULE, DELAYED RELEASE ORAL at 08:58

## 2024-06-23 RX ADMIN — ACETAMINOPHEN 650 MG: 325 TABLET ORAL at 20:45

## 2024-06-23 RX ADMIN — BUDESONIDE 500 MCG: 0.5 SUSPENSION RESPIRATORY (INHALATION) at 21:29

## 2024-06-23 RX ADMIN — WATER 1000 MG: 1 INJECTION INTRAMUSCULAR; INTRAVENOUS; SUBCUTANEOUS at 07:31

## 2024-06-23 RX ADMIN — ARFORMOTEROL TARTRATE 15 MCG: 15 SOLUTION RESPIRATORY (INHALATION) at 07:57

## 2024-06-23 RX ADMIN — BUDESONIDE 500 MCG: 0.5 SUSPENSION RESPIRATORY (INHALATION) at 07:57

## 2024-06-23 RX ADMIN — MICONAZOLE NITRATE: 20 POWDER TOPICAL at 20:46

## 2024-06-23 RX ADMIN — ISOSORBIDE MONONITRATE 60 MG: 60 TABLET, EXTENDED RELEASE ORAL at 08:57

## 2024-06-23 RX ADMIN — SODIUM ZIRCONIUM CYCLOSILICATE 10 G: 10 POWDER, FOR SUSPENSION ORAL at 15:48

## 2024-06-23 RX ADMIN — MINOXIDIL 2.5 MG: 2.5 TABLET ORAL at 11:38

## 2024-06-23 RX ADMIN — OXYBUTYNIN CHLORIDE 15 MG: 5 TABLET, EXTENDED RELEASE ORAL at 08:58

## 2024-06-23 RX ADMIN — CARVEDILOL 3.12 MG: 6.25 TABLET, FILM COATED ORAL at 18:02

## 2024-06-23 RX ADMIN — GABAPENTIN 100 MG: 100 CAPSULE ORAL at 08:58

## 2024-06-23 RX ADMIN — SODIUM CHLORIDE, PRESERVATIVE FREE 10 ML: 5 INJECTION INTRAVENOUS at 09:00

## 2024-06-23 RX ADMIN — DOCUSATE SODIUM 200 MG: 100 CAPSULE, LIQUID FILLED ORAL at 20:45

## 2024-06-23 ASSESSMENT — PAIN DESCRIPTION - DESCRIPTORS
DESCRIPTORS: ACHING;DISCOMFORT;SORE
DESCRIPTORS: ACHING;DISCOMFORT

## 2024-06-23 ASSESSMENT — PAIN DESCRIPTION - FREQUENCY: FREQUENCY: CONTINUOUS

## 2024-06-23 ASSESSMENT — PAIN - FUNCTIONAL ASSESSMENT: PAIN_FUNCTIONAL_ASSESSMENT: ACTIVITIES ARE NOT PREVENTED

## 2024-06-23 ASSESSMENT — PAIN DESCRIPTION - PAIN TYPE: TYPE: CHRONIC PAIN

## 2024-06-23 ASSESSMENT — PAIN DESCRIPTION - LOCATION
LOCATION: GENERALIZED
LOCATION: GENERALIZED

## 2024-06-23 ASSESSMENT — PAIN DESCRIPTION - ORIENTATION: ORIENTATION: RIGHT;LEFT

## 2024-06-23 ASSESSMENT — PAIN SCALES - GENERAL
PAINLEVEL_OUTOF10: 4
PAINLEVEL_OUTOF10: 3
PAINLEVEL_OUTOF10: 7
PAINLEVEL_OUTOF10: 7

## 2024-06-23 ASSESSMENT — PAIN DESCRIPTION - ONSET: ONSET: ON-GOING

## 2024-06-23 NOTE — PROGRESS NOTES
[facility] Hospitalist Progress Note    Admitting Date and Time: 6/22/2024  9:44 PM  Admit Dx: UTI (urinary tract infection) [N39.0]    Subjective:  Patient is being followed for UTI (urinary tract infection) [N39.0]   Pt seen and examined this morning  No acute events overnight  Sitting up in chair, no acute distress.  Complaining of pain in bilateral lower extremities.  Diarrhea had slowed down.  Nausea and vomiting improving    ROS: denies fever, chills, cp, sob, n/v, HA unless stated above.      sodium chloride flush  5-40 mL IntraVENous 2 times per day    enoxaparin Sodium  30 mg SubCUTAneous Daily    insulin lispro  0-8 Units SubCUTAneous TID WC    insulin lispro  0-4 Units SubCUTAneous Nightly    calcium gluconate  1,000 mg IntraVENous Once    cefTRIAXone (ROCEPHIN) IV  1,000 mg IntraVENous Q24H    aspirin  81 mg Oral Daily    carvedilol  3.125 mg Oral BID WC    docusate sodium  200 mg Oral Nightly    DULoxetine  60 mg Oral Daily    fluticasone  2 spray Nasal Daily    gabapentin  100 mg Oral BID    isosorbide mononitrate  60 mg Oral Daily    linaclotide  290 mcg Oral QAM AC    miconazole   Topical BID    minoxidil  2.5 mg Oral BID    pantoprazole  40 mg Oral QAM AC    oxyBUTYnin  15 mg Oral Daily    [Held by provider] sacubitril-valsartan  1 tablet Oral BID    budesonide  0.5 mg Nebulization BID RT    arformoterol tartrate  15 mcg Nebulization BID RT    insulin glargine  6 Units SubCUTAneous Nightly    sodium zirconium cyclosilicate  10 g Oral TID     sodium chloride flush, 5-40 mL, PRN  sodium chloride, , PRN  acetaminophen, 650 mg, Q6H PRN   Or  acetaminophen, 650 mg, Q6H PRN  polyethylene glycol, 17 g, Daily PRN  dextrose bolus, 125 mL, PRN   Or  dextrose bolus, 250 mL, PRN  glucagon (rDNA), 1 mg, PRN  dextrose, , Continuous PRN  Glucose, 15 g, PRN  HYDROcodone 5 mg - acetaminophen, 1 tablet, Q6H PRN         Objective:    BP (!) 152/86   Pulse 77   Temp 98.5 °F (36.9 °C) (Oral)   Resp 18   Ht  except for increased stool burden suggests constipation.  Continue supportive care  Hyperkalemia.  Potassium 5.7 on admission, down to 5.6 today.  Continue Lokelma x 3.  Repeat BMP in the afternoon.  Hold Entresto  Cough.  Respiratory panel procalcitonin negative.  Continue supportive care    NOTE: This report was transcribed using voice recognition software. Every effort was made to ensure accuracy; however, inadvertent computerized transcription errors may be present.  Electronically signed by Santana Davis MD on 6/23/2024 at 3:04 PM

## 2024-06-23 NOTE — HOME CARE
PT IS CURRENT WITH [facility] SN WILL NEED ACTIVE VIRGIE ORDERS ON CHART AT TIME OF DC.    Ivon Hutton Lpn  Intake Liaison  [facility]

## 2024-06-23 NOTE — PLAN OF CARE
Problem: Safety - Adult  Goal: Free from fall injury  Outcome: Progressing  Flowsheets (Taken 6/23/2024 7804)  Free From Fall Injury: Instruct family/caregiver on patient safety     Problem: Pain  Goal: Verbalizes/displays adequate comfort level or baseline comfort level  Outcome: Progressing     Problem: Skin/Tissue Integrity  Goal: Absence of new skin breakdown  Description: 1.  Monitor for areas of redness and/or skin breakdown  2.  Assess vascular access sites hourly  3.  Every 4-6 hours minimum:  Change oxygen saturation probe site  4.  Every 4-6 hours:  If on nasal continuous positive airway pressure, respiratory therapy assess nares and determine need for appliance change or resting period.  Outcome: Progressing

## 2024-06-23 NOTE — PROGRESS NOTES
4 Eyes Skin Assessment     NAME:  Steph Gonzáles  YOB: 1955  MEDICAL RECORD NUMBER:  [id number]    The patient is being assessed for  Admission    I agree that at least one RN has performed a thorough Head to Toe Skin Assessment on the patient. ALL assessment sites listed below have been assessed.      Areas assessed by both nurses:    Head, Face, Ears, Shoulders, Back, Chest, Arms, Elbows, Hands, Sacrum. Buttock, Coccyx, Ischium, Legs. Feet and Heels, and Under Medical Devices         Does the Patient have a Wound? Yes wound(s) were present on assessment. LDA wound assessment was Initiated and completed by RN       Jadon Prevention initiated by RN: Yes  Wound Care Orders initiated by RN: Yes    Pressure Injury (Stage 3,4, Unstageable, DTI, NWPT, and Complex wounds) if present, place Wound referral order by RN under : No    New Ostomies, if present place, Ostomy referral order under : No     Nurse 1 eSignature: Electronically signed by Arthur Ahn RN on 6/23/24 at 7:46 AM EDT    **SHARE this note so that the co-signing nurse can place an eSignature**    Nurse 2 eSignature: Electronically signed by Rodolfo Orr RN on 6/23/24 at 7:59 AM EDT

## 2024-06-23 NOTE — H&P
[facility] Hospitalist Group History and Physical      CHIEF COMPLAINT: UTI    History of Present Illness: This is a 68-year-old female with past medical history of COPD, CKD, diabetes mellitus, hypertension, hyperlipidemia, DAYAN, and CHF who is being admitted with UTI.  Patient initially presented to [facility] ED with complaints of dyspnea with exertion and productive cough.  States her symptoms started approximately a month ago and she was recently treated with a Medrol Dosepak and doxycycline without relief.  She states her cough is productive of yellow sputum.  Complains of burning with urination and urinary frequency.  States she has had nausea and vomiting x 3 days with loose stools, but states her stools are now formed.  Denies flank pain, abdominal pain, chest pain, fever, or chills.  Workup in [facility] ED was notable for potassium 5.6, BUN 29, creatinine 1.9, WBC 6.2.  UA was notable for large leukocyte esterase, positive nitrite, trace bacteria, and  WBCs.  CT of chest showed some hazy, patchy groundglass opacity in the bilateral lower lobes.  Patient was treated with 2000 mg IV Rocephin, 125 mg IV Solu-Medrol, 1000 mg IV calcium gluconate, 50 mEq sodium bicarbonate, and 10 g Lokelma in ED and transferred to [facility] for admission.    Informant(s) for H&P: Patient and chart review    REVIEW OF SYSTEMS:  A comprehensive review of systems was negative except for: what is in the HPI      PMH:  Past Medical History:   Diagnosis Date    Asthma     Atherosclerosis of native artery of left leg with rest pain ([facility]) 11/09/2018    Atherosclerosis of native artery of right lower extremity with ulceration of calf ([facility]) 03/06/2024    CAD (coronary artery disease) 2011    Cellulitis and abscess of trunk 04/14/2015    Cerebellar infarct ([facility]) 04/03/2019    Remote, rt. cerebellum, head CT scan, 1/9/19    Chronic back pain     Chronic kidney disease     Chronic systolic congestive heart  nightly    Miranda Raya MD   vitamin D (CHOLECALCIFEROL) 25 MCG (1000 UT) TABS tablet Take 1 tablet by mouth daily    Miranda Raya MD   docusate sodium (COLACE) 100 MG capsule Take 2 capsules by mouth at bedtime 3/11/21   Miranda Raya MD       Allergies:    Latex, Bee venom, Dilaudid [hydromorphone hcl], Dye [iodides], Keflex [cephalexin], Bee pollen, Cetirizine & related, Fentanyl, Lasix [furosemide], Levaquin [levofloxacin in d5w], Lipitor, Lyrica [pregabalin], Morphine, Naproxen, Nefazodone, Norvasc [amlodipine], Oxycodone-acetaminophen, Shellfish-derived products, and Trazodone and nefazodone    Social History:    reports that she has been smoking cigarettes. She started smoking about 49 years ago. She has a 24.9 pack-year smoking history. She has never used smokeless tobacco. She reports that she does not currently use alcohol. She reports current drug use. Drug: Marijuana (Weed).    Family History:   family history includes Asthma in her father; Breast Cancer (age of onset: 74) in her maternal aunt; Cancer in her mother; Colon Cancer in her brother and mother.       PHYSICAL EXAM:  Vitals:  /78   Pulse 94   Temp 98.2 °F (36.8 °C) (Infrared)   Resp 20   Ht 1.575 m (5' 2\")   Wt 82.1 kg (181 lb)   LMP 01/01/1990   SpO2 91%   BMI 33.11 kg/m²     General Appearance: alert and oriented to person, place and time and in no acute distress  Skin: warm and dry  Head: normocephalic and atraumatic  Eyes: pupils equal, round, and reactive to light, conjunctivae normal  Pulmonary/Chest: clear to auscultation bilaterally- no wheezes, rales or rhonchi, normal air movement, no respiratory distress  Cardiovascular: normal rate, normal S1 and S2  Abdomen: soft, non-tender, non-distended, normal bowel sounds, no masses or organomegaly  Extremities: no cyanosis, no clubbing and no edema, RLE dressing intact  Neurologic: speech normal        LABS:  Recent Labs     06/22/24  1215  06/22/24  1345     --    K 5.6* 5.7*     --    CO2 22  --    BUN 29*  --    CREATININE 1.9*  --    GLUCOSE 102*  --    CALCIUM 8.7  --        Recent Labs     06/22/24  1215   WBC 6.2   RBC 3.27*   HGB 10.6*   HCT 33.2*   .5*   MCH 32.4   MCHC 31.9*   RDW 12.9      MPV 10.0       No results for input(s): \"POCGLU\" in the last 72 hours.        Radiology:   No orders to display       EKG: Not available    ASSESSMENT:      Principal Problem:    UTI (urinary tract infection)  Active Problems:    Chronic combined systolic and diastolic heart failure ([facility])    DAYAN and COPD overlap syndrome ([facility])    DM2 (diabetes mellitus, type 2) ([facility])    Hyperlipidemia    Hyperkalemia    CKD (chronic kidney disease) stage 3, GFR 30-59 ml/min ([facility])  Resolved Problems:    * No resolved hospital problems. *      PLAN:    UTI: UA positive for infection.  Urine culture in December 2023 was positive for E. coli, sensitive to cefepime, intermediate sensitivity to cefazolin.  Will start cefepime.  Follow urine culture.  Hyperkalemia: Treated with sodium bicarbonate, calcium gluconate, and Lokelma in ED.  Awaiting repeat potassium.  CKD: Creatinine elevated at 1.9.  Appears to have been steadily increasing since February.  Will order urine studies.  CAD: S/p CABG in 2011.  Continue aspirin, Coreg, and Lipitor.  COPD: Continue Breo Ellipta and Symbicort.  DAYAN: Continue BiPAP at home settings.  Diabetes mellitus: Sliding scale insulin, carb controlled diet, Accu-Cheks ACHS.  Tresiba 10 units nightly.  Chronic heart failure: EF on most recent echo was 55%.  Continue Entresto and Bumex.  Low-sodium diet, daily weights, I's and O's.    Code Status: Full  DVT prophylaxis: Heparin    45 minutes or more spent reviewing patient chart, assessing patient, discussing plan of care with patient and family, discussing plan of care with collaborating physician, and documentation.    NOTE: This report was transcribed using voice recognition

## 2024-06-23 NOTE — PROGRESS NOTES
Date: 6/23/2024    Time: 2:56 AM    Patient Placed On BIPAP/CPAP/ Non-Invasive Ventilation?  Yes    If no must comment.  Facial area red/color change? No           If YES are Blister/Lesion present?No   If yes must notify nursing staff  BIPAP/CPAP skin barrier?  Yes    Skin barrier type:mepilexlite       Comments:   06/23/24 0123   NIV Type   $NIV $Daily Charge   NIV Started/Stopped On   Equipment Type v60   Mode AVAPS   Mask Type Full face mask   Mask Size Small   Assessment   Respirations 19   SpO2 94 %   Comfort Level Good   Using Accessory Muscles No   Mask Compliance Good   Skin Assessment Clean, dry, & intact   Skin Protection for O2 Device Yes   Settings/Measurements   CPAP/EPAP 5 cmH2O   IPAP Min 16 cmH2O   IPAP Max 28 cmH2O   Vt (Set, mL) 300 mL   Vt (Measured) 321 mL   Rate Ordered 16   Insp Rise Time (%) 3 %   FiO2  40 %   I Time/ I Time % 0.9 s   Minute Volume (L/min) 5.2 Liters   Mask Leak (lpm) 23 lpm             Tricia Pleitez RCP

## 2024-06-24 LAB
ANION GAP SERPL CALCULATED.3IONS-SCNC: 10 MMOL/L (ref 7–16)
BASOPHILS # BLD: 0.02 K/UL (ref 0–0.2)
BASOPHILS NFR BLD: 0 % (ref 0–2)
BUN SERPL-MCNC: 30 MG/DL (ref 6–23)
CALCIUM SERPL-MCNC: 8.4 MG/DL (ref 8.6–10.2)
CHLORIDE SERPL-SCNC: 104 MMOL/L (ref 98–107)
CO2 SERPL-SCNC: 23 MMOL/L (ref 22–29)
CREAT SERPL-MCNC: 1.6 MG/DL (ref 0.5–1)
EKG ATRIAL RATE: 80 BPM
EKG P AXIS: 61 DEGREES
EKG P-R INTERVAL: 232 MS
EKG Q-T INTERVAL: 412 MS
EKG QRS DURATION: 104 MS
EKG QTC CALCULATION (BAZETT): 475 MS
EKG R AXIS: 38 DEGREES
EKG T AXIS: 97 DEGREES
EKG VENTRICULAR RATE: 80 BPM
EOSINOPHIL # BLD: 0.06 K/UL (ref 0.05–0.5)
EOSINOPHILS RELATIVE PERCENT: 1 % (ref 0–6)
ERYTHROCYTE [DISTWIDTH] IN BLOOD BY AUTOMATED COUNT: 12.5 % (ref 11.5–15)
GFR, ESTIMATED: 36 ML/MIN/1.73M2
GLUCOSE BLD-MCNC: 107 MG/DL (ref 74–99)
GLUCOSE BLD-MCNC: 113 MG/DL (ref 74–99)
GLUCOSE BLD-MCNC: 123 MG/DL (ref 74–99)
GLUCOSE BLD-MCNC: 130 MG/DL (ref 74–99)
GLUCOSE BLD-MCNC: 184 MG/DL (ref 74–99)
GLUCOSE BLD-MCNC: 251 MG/DL (ref 74–99)
GLUCOSE SERPL-MCNC: 111 MG/DL (ref 74–99)
HCT VFR BLD AUTO: 34 % (ref 34–48)
HGB BLD-MCNC: 10.3 G/DL (ref 11.5–15.5)
IMM GRANULOCYTES # BLD AUTO: <0.03 K/UL (ref 0–0.58)
IMM GRANULOCYTES NFR BLD: 0 % (ref 0–5)
LYMPHOCYTES NFR BLD: 1.13 K/UL (ref 1.5–4)
LYMPHOCYTES RELATIVE PERCENT: 14 % (ref 20–42)
MCH RBC QN AUTO: 31.4 PG (ref 26–35)
MCHC RBC AUTO-ENTMCNC: 30.3 G/DL (ref 32–34.5)
MCV RBC AUTO: 103.7 FL (ref 80–99.9)
MICROORGANISM SPEC CULT: ABNORMAL
MICROORGANISM SPEC CULT: NO GROWTH
MONOCYTES NFR BLD: 0.37 K/UL (ref 0.1–0.95)
MONOCYTES NFR BLD: 5 % (ref 2–12)
NEUTROPHILS NFR BLD: 80 % (ref 43–80)
NEUTS SEG NFR BLD: 6.3 K/UL (ref 1.8–7.3)
PLATELET # BLD AUTO: 255 K/UL (ref 130–450)
PMV BLD AUTO: 10.6 FL (ref 7–12)
POTASSIUM SERPL-SCNC: 4.8 MMOL/L (ref 3.5–5)
RBC # BLD AUTO: 3.28 M/UL (ref 3.5–5.5)
SERVICE CMNT-IMP: ABNORMAL
SERVICE CMNT-IMP: NORMAL
SODIUM SERPL-SCNC: 137 MMOL/L (ref 132–146)
SPECIMEN DESCRIPTION: ABNORMAL
SPECIMEN DESCRIPTION: NORMAL
WBC OTHER # BLD: 7.9 K/UL (ref 4.5–11.5)

## 2024-06-24 PROCEDURE — 6360000002 HC RX W HCPCS: Performed by: STUDENT IN AN ORGANIZED HEALTH CARE EDUCATION/TRAINING PROGRAM

## 2024-06-24 PROCEDURE — 2580000003 HC RX 258: Performed by: STUDENT IN AN ORGANIZED HEALTH CARE EDUCATION/TRAINING PROGRAM

## 2024-06-24 PROCEDURE — 2060000000 HC ICU INTERMEDIATE R&B

## 2024-06-24 PROCEDURE — 80048 BASIC METABOLIC PNL TOTAL CA: CPT

## 2024-06-24 PROCEDURE — 2580000003 HC RX 258

## 2024-06-24 PROCEDURE — 85025 COMPLETE CBC W/AUTO DIFF WBC: CPT

## 2024-06-24 PROCEDURE — 6360000002 HC RX W HCPCS

## 2024-06-24 PROCEDURE — 82962 GLUCOSE BLOOD TEST: CPT

## 2024-06-24 PROCEDURE — 36415 COLL VENOUS BLD VENIPUNCTURE: CPT

## 2024-06-24 PROCEDURE — 94660 CPAP INITIATION&MGMT: CPT

## 2024-06-24 PROCEDURE — 94640 AIRWAY INHALATION TREATMENT: CPT

## 2024-06-24 PROCEDURE — 99222 1ST HOSP IP/OBS MODERATE 55: CPT | Performed by: STUDENT IN AN ORGANIZED HEALTH CARE EDUCATION/TRAINING PROGRAM

## 2024-06-24 PROCEDURE — 6370000000 HC RX 637 (ALT 250 FOR IP): Performed by: INTERNAL MEDICINE

## 2024-06-24 PROCEDURE — 6370000000 HC RX 637 (ALT 250 FOR IP)

## 2024-06-24 PROCEDURE — 93010 ELECTROCARDIOGRAM REPORT: CPT | Performed by: INTERNAL MEDICINE

## 2024-06-24 RX ORDER — OSTOMY ADHESIVE
1 STRIP MISCELLANEOUS ONCE
Status: DISCONTINUED | OUTPATIENT
Start: 2024-06-24 | End: 2024-06-27 | Stop reason: HOSPADM

## 2024-06-24 RX ORDER — ONDANSETRON 2 MG/ML
4 INJECTION INTRAMUSCULAR; INTRAVENOUS EVERY 6 HOURS PRN
Status: DISCONTINUED | OUTPATIENT
Start: 2024-06-24 | End: 2024-06-27 | Stop reason: HOSPADM

## 2024-06-24 RX ORDER — ENOXAPARIN SODIUM 100 MG/ML
40 INJECTION SUBCUTANEOUS DAILY
Status: DISCONTINUED | OUTPATIENT
Start: 2024-06-24 | End: 2024-06-27 | Stop reason: HOSPADM

## 2024-06-24 RX ADMIN — FLUTICASONE PROPIONATE 2 SPRAY: 50 SPRAY, METERED NASAL at 10:35

## 2024-06-24 RX ADMIN — SODIUM CHLORIDE, PRESERVATIVE FREE 10 ML: 5 INJECTION INTRAVENOUS at 21:54

## 2024-06-24 RX ADMIN — GABAPENTIN 100 MG: 100 CAPSULE ORAL at 10:34

## 2024-06-24 RX ADMIN — OXYBUTYNIN CHLORIDE 15 MG: 5 TABLET, EXTENDED RELEASE ORAL at 10:33

## 2024-06-24 RX ADMIN — HYDROCODONE BITARTRATE AND ACETAMINOPHEN 1 TABLET: 5; 325 TABLET ORAL at 21:53

## 2024-06-24 RX ADMIN — HYDROCODONE BITARTRATE AND ACETAMINOPHEN 1 TABLET: 5; 325 TABLET ORAL at 10:32

## 2024-06-24 RX ADMIN — BUDESONIDE 500 MCG: 0.5 SUSPENSION RESPIRATORY (INHALATION) at 20:04

## 2024-06-24 RX ADMIN — ENOXAPARIN SODIUM 40 MG: 100 INJECTION SUBCUTANEOUS at 10:32

## 2024-06-24 RX ADMIN — MINOXIDIL 2.5 MG: 2.5 TABLET ORAL at 10:32

## 2024-06-24 RX ADMIN — ACETAMINOPHEN 650 MG: 325 TABLET ORAL at 16:21

## 2024-06-24 RX ADMIN — DULOXETINE HYDROCHLORIDE 60 MG: 60 CAPSULE, DELAYED RELEASE ORAL at 10:33

## 2024-06-24 RX ADMIN — WATER 1000 MG: 1 INJECTION INTRAMUSCULAR; INTRAVENOUS; SUBCUTANEOUS at 05:04

## 2024-06-24 RX ADMIN — ASPIRIN 81 MG: 81 TABLET, CHEWABLE ORAL at 10:33

## 2024-06-24 RX ADMIN — CARVEDILOL 3.12 MG: 6.25 TABLET, FILM COATED ORAL at 17:50

## 2024-06-24 RX ADMIN — MICONAZOLE NITRATE: 20 POWDER TOPICAL at 21:56

## 2024-06-24 RX ADMIN — ONDANSETRON 4 MG: 2 INJECTION INTRAMUSCULAR; INTRAVENOUS at 13:02

## 2024-06-24 RX ADMIN — MICONAZOLE NITRATE: 20 POWDER TOPICAL at 10:35

## 2024-06-24 RX ADMIN — SODIUM ZIRCONIUM CYCLOSILICATE 10 G: 10 POWDER, FOR SUSPENSION ORAL at 10:34

## 2024-06-24 RX ADMIN — CARVEDILOL 3.12 MG: 6.25 TABLET, FILM COATED ORAL at 10:33

## 2024-06-24 RX ADMIN — GUAIFENESIN AND DEXTROMETHORPHAN 5 ML: 100; 10 SYRUP ORAL at 10:32

## 2024-06-24 RX ADMIN — GABAPENTIN 100 MG: 100 CAPSULE ORAL at 21:53

## 2024-06-24 RX ADMIN — ACETAMINOPHEN 650 MG: 325 TABLET ORAL at 04:54

## 2024-06-24 RX ADMIN — ARFORMOTEROL TARTRATE 15 MCG: 15 SOLUTION RESPIRATORY (INHALATION) at 20:04

## 2024-06-24 RX ADMIN — ISOSORBIDE MONONITRATE 60 MG: 60 TABLET, EXTENDED RELEASE ORAL at 10:33

## 2024-06-24 RX ADMIN — GUAIFENESIN AND DEXTROMETHORPHAN 5 ML: 100; 10 SYRUP ORAL at 21:58

## 2024-06-24 RX ADMIN — MINOXIDIL 2.5 MG: 2.5 TABLET ORAL at 21:53

## 2024-06-24 RX ADMIN — DOCUSATE SODIUM 200 MG: 100 CAPSULE, LIQUID FILLED ORAL at 21:53

## 2024-06-24 RX ADMIN — HYDROCODONE BITARTRATE AND ACETAMINOPHEN 1 TABLET: 5; 325 TABLET ORAL at 02:35

## 2024-06-24 RX ADMIN — PANTOPRAZOLE SODIUM 40 MG: 40 TABLET, DELAYED RELEASE ORAL at 05:04

## 2024-06-24 ASSESSMENT — PAIN SCALES - GENERAL
PAINLEVEL_OUTOF10: 10
PAINLEVEL_OUTOF10: 3
PAINLEVEL_OUTOF10: 7
PAINLEVEL_OUTOF10: 10
PAINLEVEL_OUTOF10: 10
PAINLEVEL_OUTOF10: 2
PAINLEVEL_OUTOF10: 3
PAINLEVEL_OUTOF10: 7

## 2024-06-24 ASSESSMENT — PAIN - FUNCTIONAL ASSESSMENT
PAIN_FUNCTIONAL_ASSESSMENT: PREVENTS OR INTERFERES SOME ACTIVE ACTIVITIES AND ADLS
PAIN_FUNCTIONAL_ASSESSMENT: ACTIVITIES ARE NOT PREVENTED
PAIN_FUNCTIONAL_ASSESSMENT: ACTIVITIES ARE NOT PREVENTED

## 2024-06-24 ASSESSMENT — PAIN DESCRIPTION - DESCRIPTORS
DESCRIPTORS: ACHING;DISCOMFORT;SORE
DESCRIPTORS: ACHING
DESCRIPTORS: ACHING;DISCOMFORT;SORE

## 2024-06-24 ASSESSMENT — PAIN DESCRIPTION - ORIENTATION
ORIENTATION: LOWER;MID
ORIENTATION: LOWER
ORIENTATION: LOWER;MID

## 2024-06-24 ASSESSMENT — PAIN DESCRIPTION - PAIN TYPE: TYPE: CHRONIC PAIN

## 2024-06-24 ASSESSMENT — PAIN DESCRIPTION - LOCATION
LOCATION: BACK
LOCATION: BACK
LOCATION: CHEST;BACK
LOCATION: BACK

## 2024-06-24 ASSESSMENT — PAIN DESCRIPTION - ONSET
ONSET: ON-GOING
ONSET: ON-GOING

## 2024-06-24 ASSESSMENT — PAIN DESCRIPTION - FREQUENCY
FREQUENCY: CONTINUOUS
FREQUENCY: CONTINUOUS

## 2024-06-24 NOTE — PROGRESS NOTES
While rounding found the patient's room dark and the patient asleep, did not disturb. Prayer was said for the patient and  remained available for support.

## 2024-06-24 NOTE — PROGRESS NOTES
06/23/24 2140   NIV Type   NIV Started/Stopped On   Equipment Type v60   Mode AVAPS   Mask Type Full face mask   Mask Size Small   Assessment   SpO2 95 %   Level of Consciousness 0   Comfort Level Good   Using Accessory Muscles No   Mask Compliance Good   Skin Assessment Clean, dry, & intact   Skin Protection for O2 Device Yes   Orientation Middle   Location Nose   Intervention(s) Skin Barrier   Settings/Measurements   CPAP/EPAP 5 cmH2O   IPAP Min 16 cmH2O   IPAP Max 26 cmH2O   Vt (Set, mL) 300 mL   Vt (Measured) 310 mL   Rate Ordered 16   FiO2  40 %   Minute Volume (L/min) 6.4 Liters   Mask Leak (lpm) 24 lpm   Alarm Settings   Alarms On Y   Low Pressure (cmH2O) 10 cmH2O   High Pressure (cmH2O) 40 cmH2O   RR Low (bpm) 12   RR High (bpm) 45 br/min     Date: 6/23/2024    Time: 9:42 PM    Patient Placed On BIPAP/CPAP/ Non-Invasive Ventilation?  Yes    If no must comment.  Facial area red/color change? No           If YES are Blister/Lesion present?No   If yes must notify nursing staff  BIPAP/CPAP skin barrier?  Yes    Skin barrier type:mepilexlite       Comments:        Keshia Jimenez RCP

## 2024-06-24 NOTE — PROGRESS NOTES
[facility] Hospitalist Progress Note    Admitting Date and Time: 6/22/2024  9:44 PM  Admit Dx: UTI (urinary tract infection) [N39.0]    Subjective:  Patient is being followed for UTI (urinary tract infection) [N39.0]     No acute events overnight.  Patient reports improving and her nausea and diarrhea.  Creatinine is improving to 1.6 today.    ROS: denies fever, chills, cp, sob, n/v, HA unless stated above.      enoxaparin  40 mg SubCUTAneous Daily    sodium chloride flush  5-40 mL IntraVENous 2 times per day    insulin lispro  0-8 Units SubCUTAneous TID WC    insulin lispro  0-4 Units SubCUTAneous Nightly    calcium gluconate  1,000 mg IntraVENous Once    cefTRIAXone (ROCEPHIN) IV  1,000 mg IntraVENous Q24H    aspirin  81 mg Oral Daily    carvedilol  3.125 mg Oral BID WC    docusate sodium  200 mg Oral Nightly    DULoxetine  60 mg Oral Daily    fluticasone  2 spray Nasal Daily    gabapentin  100 mg Oral BID    isosorbide mononitrate  60 mg Oral Daily    linaclotide  290 mcg Oral QAM AC    miconazole   Topical BID    minoxidil  2.5 mg Oral BID    pantoprazole  40 mg Oral QAM AC    oxyBUTYnin  15 mg Oral Daily    [Held by provider] sacubitril-valsartan  1 tablet Oral BID    budesonide  0.5 mg Nebulization BID RT    arformoterol tartrate  15 mcg Nebulization BID RT    insulin glargine  6 Units SubCUTAneous Nightly    sodium zirconium cyclosilicate  10 g Oral TID     sodium chloride flush, 5-40 mL, PRN  sodium chloride, , PRN  acetaminophen, 650 mg, Q6H PRN   Or  acetaminophen, 650 mg, Q6H PRN  polyethylene glycol, 17 g, Daily PRN  dextrose bolus, 125 mL, PRN   Or  dextrose bolus, 250 mL, PRN  glucagon (rDNA), 1 mg, PRN  dextrose, , Continuous PRN  Glucose, 15 g, PRN  HYDROcodone 5 mg - acetaminophen, 1 tablet, Q6H PRN  guaiFENesin-dextromethorphan, 5 mL, Q4H PRN         Objective:    BP (!) 158/72   Pulse 87   Temp 97.6 °F (36.4 °C) (Infrared)   Resp 17   Ht 1.575 m (5' 2\")   Wt 82.1 kg (181 lb)   LMP  01/01/1990   SpO2 96%   BMI 33.11 kg/m²     General Appearance: alert and oriented to person, place and time and in no acute distress  Skin: warm and dry.  Right lower extremity wounds covered with dressing  Head: normocephalic and atraumatic  Eyes: pupils equal, round, and reactive to light, extraocular eye movements intact, conjunctivae normal  Neck: neck supple and non tender without mass   Pulmonary/Chest: clear to auscultation bilaterally- no wheezes, rales or rhonchi, normal air movement, no respiratory distress  Cardiovascular: normal rate, normal S1 and S2 and no carotid bruits  Abdomen: soft, non-tender, non-distended, normal bowel sounds, no masses or organomegaly  Extremities: no cyanosis, no clubbing and no edema  Neurologic: no cranial nerve deficit and speech normal        Recent Labs     06/23/24  0301 06/23/24 2000 06/24/24  0436    133 137   K 5.6* 4.7 4.8    98 104   CO2 22 25 23   BUN 30* 29* 30*   CREATININE 1.6* 1.7* 1.6*   GLUCOSE 230* 151* 111*   CALCIUM 8.9 8.8 8.4*       Recent Labs     06/22/24  1215 06/24/24  0436   WBC 6.2 7.9   RBC 3.27* 3.28*   HGB 10.6* 10.3*   HCT 33.2* 34.0   .5* 103.7*   MCH 32.4 31.4   MCHC 31.9* 30.3*   RDW 12.9 12.5    255   MPV 10.0 10.6       Radiology:       Assessment:    Principal Problem:    Hyperkalemia  Active Problems:    Chronic combined systolic and diastolic heart failure (HCC)    DAYAN and COPD overlap syndrome (HCC)    DM2 (diabetes mellitus, type 2) (HCC)    Hyperlipidemia    Acute kidney injury superimposed on CKD (HCC)    UTI (urinary tract infection)    Generalized abdominal pain    Nausea vomiting and diarrhea  Resolved Problems:    * No resolved hospital problems. *      Plan:    ALEC on CKD  -Creatinine 1.9 on admission, baseline 1.3-1.5  -Creatinine improved to 1.6 today    Acute cystitis without hematuria  -Urine culture showed Proteus mirabilis  -Continue ceftriaxone    Nausea, vomiting and diarrhea  -C. difficile  and stool culture pending  -CT abdomen pelvis is negative for acute process except for increased stool burden  -Improved    Hyperkalemia  -Potassium 5.7 on admission   -Will hold Entresto  -Patient received Lokelma   -Resolved    Chronic COPD  -Continue bronchodilators    VTE prophylaxis: Enoxaparin    NOTE: This report was transcribed using voice recognition software. Every effort was made to ensure accuracy; however, inadvertent computerized transcription errors may be present.  Electronically signed by Maura Walters MD on 6/24/2024 at 9:16 AM

## 2024-06-24 NOTE — PROGRESS NOTES
[facility] Quality Flow/Interdisciplinary Rounds Progress Note        Quality Flow Rounds held on June 24, 2024    Disciplines Attending:  Bedside Nurse, , , and Nursing Unit Leadership    Steph Gonzáles was admitted on 6/22/2024  9:44 PM    Anticipated Discharge Date:       Disposition:    Jadon Score:  Jadon Scale Score: 20    Readmission Risk              Risk of Unplanned Readmission:  51           Discussed patient goal for the day, patient clinical progression, and barriers to discharge.  The following Goal(s) of the Day/Commitment(s) have been identified:   discharge planning, IV ABX, monitor labs      Mason Carrera RN  June 24, 2024

## 2024-06-24 NOTE — PROGRESS NOTES
This patient is on medication that requires renal, weight, and/or indication dose adjustment.      Date Body Weight IBW  Adjusted BW SCr  CrCl Dialysis status   6/24/2024 82.1 kg (181 lb) Ideal body weight: 50.1 kg (110 lb 7.2 oz)  Adjusted ideal body weight: 62.9 kg (138 lb 10.7 oz) Serum creatinine: 1.6 mg/dL (H) 06/24/24 0436  Estimated creatinine clearance: 33 mL/min (A) N/a       Pharmacy has dose-adjusted the following medication(s):    Date Previous Order Adjusted Order   6/24/2024 Lovenox 30 mg daily Lovenox 40 mg daily       These changes were made per protocol according to the [facility]   Automatic Renal Dose Adjustment Policy.     *Please note this dose may need readjusted if patient's condition changes.    Please contact pharmacy with any questions regarding these changes.    Lizz Aceves RPH  6/24/2024  6:11 AM

## 2024-06-24 NOTE — PROGRESS NOTES
Consult for wound rt leg  Patient alert and oriented. States dressing to be due today. Follows at wound center with dr. Bear.  Silver dressing and unna boot. Dressing in place  States needs hypo-allergenic sheets. EVS notified.none available at this time  D/w  Dr. Walters, states OK to consult Dr. Bear. Order placed  Radha Bowen, CNS, Wound Care

## 2024-06-24 NOTE — ACP (ADVANCE CARE PLANNING)
Advance Care Planning   Healthcare Decision Maker:    Primary Decision Maker: Rachel Garciaa - Child - [phone]    Secondary Decision Maker: Fatmata Conrad - Brother/Sister - [phone]    Click here to complete Healthcare Decision Makers including selection of the Healthcare Decision Maker Relationship (ie \"Primary\").  Today we documented Decision Maker(s) consistent with ACP documents on file.

## 2024-06-24 NOTE — PLAN OF CARE
Problem: Safety - Adult  Goal: Free from fall injury  6/23/2024 2204 by Fer Wheat RN  Outcome: Progressing  6/23/2024 1223 by Fartun Gomes RN  Outcome: Progressing  Flowsheets (Taken 6/23/2024 0830)  Free From Fall Injury: Instruct family/caregiver on patient safety     Problem: Pain  Goal: Verbalizes/displays adequate comfort level or baseline comfort level  6/23/2024 2204 by Fre Wheat RN  Outcome: Progressing  6/23/2024 1223 by Fartun Gomes RN  Outcome: Progressing     Problem: Skin/Tissue Integrity  Goal: Absence of new skin breakdown  Description: 1.  Monitor for areas of redness and/or skin breakdown  2.  Assess vascular access sites hourly  3.  Every 4-6 hours minimum:  Change oxygen saturation probe site  4.  Every 4-6 hours:  If on nasal continuous positive airway pressure, respiratory therapy assess nares and determine need for appliance change or resting period.  6/23/2024 2204 by Fer Wheat RN  Outcome: Progressing  6/23/2024 1223 by Fartun Gomes RN  Outcome: Progressing

## 2024-06-24 NOTE — CARE COORDINATION
Social Work / Discharge PLanning : SW met with patient and explained role as discharge planner/ transition of care. Patient verified plan at discharge is HOME. Ivon from [facility] verified patient is active and will need VIRGIE orders. Patient resides in a 1-story home with  her family. There are 3 steps to enter the home with a ramp entry. Patient has a cane and walker .Patient uses [facility] pharmacy ([place]) and PCP is DYLAN Blakely at [facility]. Patient also has a home health aide through [facility], 5 days a week 7 hrs daily.  [employer] supplies patient  NIV, with 2L O2 bled in, only at night. Patient currently on RA. Patient denies any additional needs. AWait treatment plan. SW to follow. Electronically signed by SINDHU Anand on 6/24/24 at 10:27 AM EDT

## 2024-06-25 LAB
ANION GAP SERPL CALCULATED.3IONS-SCNC: 7 MMOL/L (ref 7–16)
BASOPHILS # BLD: 0.03 K/UL (ref 0–0.2)
BASOPHILS NFR BLD: 1 % (ref 0–2)
BUN SERPL-MCNC: 24 MG/DL (ref 6–23)
CALCIUM SERPL-MCNC: 8.1 MG/DL (ref 8.6–10.2)
CHLORIDE SERPL-SCNC: 103 MMOL/L (ref 98–107)
CO2 SERPL-SCNC: 27 MMOL/L (ref 22–29)
CREAT SERPL-MCNC: 1.6 MG/DL (ref 0.5–1)
EOSINOPHIL # BLD: 0.12 K/UL (ref 0.05–0.5)
EOSINOPHILS RELATIVE PERCENT: 2 % (ref 0–6)
ERYTHROCYTE [DISTWIDTH] IN BLOOD BY AUTOMATED COUNT: 12.1 % (ref 11.5–15)
GFR, ESTIMATED: 35 ML/MIN/1.73M2
GLUCOSE BLD-MCNC: 132 MG/DL (ref 74–99)
GLUCOSE BLD-MCNC: 141 MG/DL (ref 74–99)
GLUCOSE BLD-MCNC: 85 MG/DL (ref 74–99)
GLUCOSE BLD-MCNC: 90 MG/DL (ref 74–99)
GLUCOSE SERPL-MCNC: 85 MG/DL (ref 74–99)
HCT VFR BLD AUTO: 33.1 % (ref 34–48)
HGB BLD-MCNC: 10 G/DL (ref 11.5–15.5)
IMM GRANULOCYTES # BLD AUTO: <0.03 K/UL (ref 0–0.58)
IMM GRANULOCYTES NFR BLD: 0 % (ref 0–5)
LYMPHOCYTES NFR BLD: 0.94 K/UL (ref 1.5–4)
LYMPHOCYTES RELATIVE PERCENT: 18 % (ref 20–42)
MCH RBC QN AUTO: 31.6 PG (ref 26–35)
MCHC RBC AUTO-ENTMCNC: 30.2 G/DL (ref 32–34.5)
MCV RBC AUTO: 104.7 FL (ref 80–99.9)
MICROORGANISM SPEC CULT: NORMAL
MICROORGANISM SPEC CULT: NORMAL
MONOCYTES NFR BLD: 0.31 K/UL (ref 0.1–0.95)
MONOCYTES NFR BLD: 6 % (ref 2–12)
NEUTROPHILS NFR BLD: 73 % (ref 43–80)
NEUTS SEG NFR BLD: 3.89 K/UL (ref 1.8–7.3)
PLATELET # BLD AUTO: 236 K/UL (ref 130–450)
PMV BLD AUTO: 10.4 FL (ref 7–12)
POTASSIUM SERPL-SCNC: 4.7 MMOL/L (ref 3.5–5)
RBC # BLD AUTO: 3.16 M/UL (ref 3.5–5.5)
ROTAVIRUS ANTIGEN: NEGATIVE
SERVICE CMNT-IMP: NORMAL
SODIUM SERPL-SCNC: 137 MMOL/L (ref 132–146)
SOURCE, 60200063: NORMAL
SPECIMEN DESCRIPTION: NORMAL
WBC OTHER # BLD: 5.3 K/UL (ref 4.5–11.5)

## 2024-06-25 PROCEDURE — 94640 AIRWAY INHALATION TREATMENT: CPT

## 2024-06-25 PROCEDURE — 2580000003 HC RX 258: Performed by: STUDENT IN AN ORGANIZED HEALTH CARE EDUCATION/TRAINING PROGRAM

## 2024-06-25 PROCEDURE — 6360000002 HC RX W HCPCS: Performed by: STUDENT IN AN ORGANIZED HEALTH CARE EDUCATION/TRAINING PROGRAM

## 2024-06-25 PROCEDURE — 2060000000 HC ICU INTERMEDIATE R&B

## 2024-06-25 PROCEDURE — 2580000003 HC RX 258

## 2024-06-25 PROCEDURE — 2700000000 HC OXYGEN THERAPY PER DAY

## 2024-06-25 PROCEDURE — 6370000000 HC RX 637 (ALT 250 FOR IP)

## 2024-06-25 PROCEDURE — 94660 CPAP INITIATION&MGMT: CPT

## 2024-06-25 PROCEDURE — 85025 COMPLETE CBC W/AUTO DIFF WBC: CPT

## 2024-06-25 PROCEDURE — 6370000000 HC RX 637 (ALT 250 FOR IP): Performed by: INTERNAL MEDICINE

## 2024-06-25 PROCEDURE — 36415 COLL VENOUS BLD VENIPUNCTURE: CPT

## 2024-06-25 PROCEDURE — 80048 BASIC METABOLIC PNL TOTAL CA: CPT

## 2024-06-25 PROCEDURE — 99222 1ST HOSP IP/OBS MODERATE 55: CPT | Performed by: STUDENT IN AN ORGANIZED HEALTH CARE EDUCATION/TRAINING PROGRAM

## 2024-06-25 PROCEDURE — 82962 GLUCOSE BLOOD TEST: CPT

## 2024-06-25 PROCEDURE — 6360000002 HC RX W HCPCS

## 2024-06-25 PROCEDURE — 6370000000 HC RX 637 (ALT 250 FOR IP): Performed by: STUDENT IN AN ORGANIZED HEALTH CARE EDUCATION/TRAINING PROGRAM

## 2024-06-25 RX ORDER — PHENAZOPYRIDINE HYDROCHLORIDE 100 MG/1
200 TABLET, FILM COATED ORAL
Status: DISCONTINUED | OUTPATIENT
Start: 2024-06-25 | End: 2024-06-27 | Stop reason: HOSPADM

## 2024-06-25 RX ADMIN — INSULIN GLARGINE 6 UNITS: 100 INJECTION, SOLUTION SUBCUTANEOUS at 21:11

## 2024-06-25 RX ADMIN — ASPIRIN 81 MG: 81 TABLET, CHEWABLE ORAL at 10:26

## 2024-06-25 RX ADMIN — ARFORMOTEROL TARTRATE 15 MCG: 15 SOLUTION RESPIRATORY (INHALATION) at 19:54

## 2024-06-25 RX ADMIN — SACUBITRIL AND VALSARTAN 1 TABLET: 97; 103 TABLET, FILM COATED ORAL at 21:14

## 2024-06-25 RX ADMIN — HYDROCODONE BITARTRATE AND ACETAMINOPHEN 1 TABLET: 5; 325 TABLET ORAL at 21:13

## 2024-06-25 RX ADMIN — ONDANSETRON 4 MG: 2 INJECTION INTRAMUSCULAR; INTRAVENOUS at 10:25

## 2024-06-25 RX ADMIN — BUDESONIDE 500 MCG: 0.5 SUSPENSION RESPIRATORY (INHALATION) at 08:56

## 2024-06-25 RX ADMIN — PHENAZOPYRIDINE 200 MG: 100 TABLET ORAL at 16:39

## 2024-06-25 RX ADMIN — OXYBUTYNIN CHLORIDE 15 MG: 5 TABLET, EXTENDED RELEASE ORAL at 10:26

## 2024-06-25 RX ADMIN — SODIUM CHLORIDE, PRESERVATIVE FREE 10 ML: 5 INJECTION INTRAVENOUS at 21:13

## 2024-06-25 RX ADMIN — SODIUM CHLORIDE, PRESERVATIVE FREE 10 ML: 5 INJECTION INTRAVENOUS at 10:27

## 2024-06-25 RX ADMIN — PANTOPRAZOLE SODIUM 40 MG: 40 TABLET, DELAYED RELEASE ORAL at 06:13

## 2024-06-25 RX ADMIN — PHENAZOPYRIDINE 200 MG: 100 TABLET ORAL at 15:03

## 2024-06-25 RX ADMIN — MICONAZOLE NITRATE: 20 POWDER TOPICAL at 21:13

## 2024-06-25 RX ADMIN — BUDESONIDE 500 MCG: 0.5 SUSPENSION RESPIRATORY (INHALATION) at 19:54

## 2024-06-25 RX ADMIN — CARVEDILOL 3.12 MG: 6.25 TABLET, FILM COATED ORAL at 16:40

## 2024-06-25 RX ADMIN — CARVEDILOL 3.12 MG: 6.25 TABLET, FILM COATED ORAL at 10:26

## 2024-06-25 RX ADMIN — GUAIFENESIN AND DEXTROMETHORPHAN 5 ML: 100; 10 SYRUP ORAL at 15:03

## 2024-06-25 RX ADMIN — ACETAMINOPHEN 650 MG: 325 TABLET ORAL at 02:36

## 2024-06-25 RX ADMIN — DULOXETINE HYDROCHLORIDE 60 MG: 60 CAPSULE, DELAYED RELEASE ORAL at 10:27

## 2024-06-25 RX ADMIN — FLUTICASONE PROPIONATE 2 SPRAY: 50 SPRAY, METERED NASAL at 10:28

## 2024-06-25 RX ADMIN — MINOXIDIL 2.5 MG: 2.5 TABLET ORAL at 10:26

## 2024-06-25 RX ADMIN — GUAIFENESIN AND DEXTROMETHORPHAN 5 ML: 100; 10 SYRUP ORAL at 10:25

## 2024-06-25 RX ADMIN — GABAPENTIN 100 MG: 100 CAPSULE ORAL at 10:26

## 2024-06-25 RX ADMIN — DOCUSATE SODIUM 200 MG: 100 CAPSULE, LIQUID FILLED ORAL at 21:12

## 2024-06-25 RX ADMIN — HYDROCODONE BITARTRATE AND ACETAMINOPHEN 1 TABLET: 5; 325 TABLET ORAL at 12:12

## 2024-06-25 RX ADMIN — ARFORMOTEROL TARTRATE 15 MCG: 15 SOLUTION RESPIRATORY (INHALATION) at 08:56

## 2024-06-25 RX ADMIN — WATER 1000 MG: 1 INJECTION INTRAMUSCULAR; INTRAVENOUS; SUBCUTANEOUS at 06:13

## 2024-06-25 RX ADMIN — MINOXIDIL 2.5 MG: 2.5 TABLET ORAL at 21:12

## 2024-06-25 RX ADMIN — MICONAZOLE NITRATE: 20 POWDER TOPICAL at 10:28

## 2024-06-25 RX ADMIN — GABAPENTIN 100 MG: 100 CAPSULE ORAL at 21:12

## 2024-06-25 RX ADMIN — ISOSORBIDE MONONITRATE 60 MG: 60 TABLET, EXTENDED RELEASE ORAL at 10:26

## 2024-06-25 RX ADMIN — HYDROCODONE BITARTRATE AND ACETAMINOPHEN 1 TABLET: 5; 325 TABLET ORAL at 06:13

## 2024-06-25 ASSESSMENT — PAIN DESCRIPTION - DESCRIPTORS
DESCRIPTORS: DISCOMFORT
DESCRIPTORS: ACHING;DISCOMFORT
DESCRIPTORS: ACHING;THROBBING

## 2024-06-25 ASSESSMENT — PAIN DESCRIPTION - LOCATION
LOCATION: FLANK;HEAD
LOCATION: RIB CAGE
LOCATION: BACK

## 2024-06-25 ASSESSMENT — PAIN DESCRIPTION - FREQUENCY: FREQUENCY: CONTINUOUS

## 2024-06-25 ASSESSMENT — PAIN SCALES - GENERAL
PAINLEVEL_OUTOF10: 10
PAINLEVEL_OUTOF10: 4
PAINLEVEL_OUTOF10: 9
PAINLEVEL_OUTOF10: 10
PAINLEVEL_OUTOF10: 0
PAINLEVEL_OUTOF10: 2

## 2024-06-25 ASSESSMENT — PAIN DESCRIPTION - PAIN TYPE: TYPE: ACUTE PAIN

## 2024-06-25 ASSESSMENT — PAIN DESCRIPTION - ONSET: ONSET: GRADUAL

## 2024-06-25 NOTE — CONSULTS
Podiatry Consult H&P  6/25/2024   Steph Gonzáles     HISTORY OF PRESENT ILLNESS: This is a 68 y.o. female seen bedside for right leg wound. Patient states she follows with Dr. Bear at the wound clinic. Patient states Dr. Bear always puts a unna boot on for her and she demands an unna boot be placed. Patient denies n,v,f,c,d at this time. Patient has no other pedal complaints.        Past Medical History:   Diagnosis Date    Asthma     Atherosclerosis of native artery of left leg with rest pain (HCC) 11/09/2018    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) 03/06/2024    CAD (coronary artery disease) 2011    Cellulitis and abscess of trunk 04/14/2015    Cerebellar infarct (HCC) 04/03/2019    Remote, rt. cerebellum, head CT scan, 1/9/19    Chronic back pain     Chronic kidney disease     Chronic systolic congestive heart failure (HCC) 10/04/2013    Chronic venous insufficiency 10/11/2017    COPD (chronic obstructive pulmonary disease) (HCC)     COVID-19     Depression     Diabetes mellitus (HCC)     Diabetic neuropathy (HCC)     Diverticulosis     Fatty liver 01/08/2016    per US    Glaucoma, open angle 02/01/2016    Mild-OU    Hepatic encephalopathy (HCC) 02/07/2016    resolved    Hiatal hernia     History of blood transfusion     Hyperlipidemia     Hyperplastic colon polyp     Hypertension     Incontinence     Liver mass     Lower limb ulcer, calf, right, limited to breakdown of skin (HCC) 03/06/2024    Lower limb ulcer, calf, right, with fat layer exposed (HCC) 04/01/2024    Morbid obesity (HCC)     Movement disorder     Myocardial infarction (HCC) 2011    O2 dependent 09/16/2021    with bipap 3 liters    Osteoarthritis, generalized     Pain in both lower legs 10/11/2017    Peripheral vascular disease (HCC)     Pneumonia 01/26/2014    Pulmonary edema     resolved    PVD (peripheral vascular disease) with claudication (HCC) 08/30/2017    Sleep apnea     uses BI  other teams.  - Discussed with Dr. Bear    Thank you for involving podiatry in this patients care. Please do not hesitate to call with any questions or concerns     Tabitha Simmons DPM  PGY2 Podiatry Resident   6/25/2024   1:30 PM

## 2024-06-25 NOTE — PROGRESS NOTES
[facility] Hospitalist Progress Note    Admitting Date and Time: 6/22/2024  9:44 PM  Admit Dx: UTI (urinary tract infection) [N39.0]    Subjective:  Patient is being followed for UTI (urinary tract infection) [N39.0]     No acute events overnight.  Patient reports improving in her nausea and diarrhea.  Creatinine is improving to 1.6 today.    ROS: denies fever, chills, cp, sob, n/v, HA unless stated above.      enoxaparin  40 mg SubCUTAneous Daily    Unna-Flex Elastic Unna Boot  1 each Topical Once    sodium chloride flush  5-40 mL IntraVENous 2 times per day    insulin lispro  0-8 Units SubCUTAneous TID WC    insulin lispro  0-4 Units SubCUTAneous Nightly    calcium gluconate  1,000 mg IntraVENous Once    cefTRIAXone (ROCEPHIN) IV  1,000 mg IntraVENous Q24H    aspirin  81 mg Oral Daily    carvedilol  3.125 mg Oral BID WC    docusate sodium  200 mg Oral Nightly    DULoxetine  60 mg Oral Daily    fluticasone  2 spray Nasal Daily    gabapentin  100 mg Oral BID    isosorbide mononitrate  60 mg Oral Daily    linaclotide  290 mcg Oral QAM AC    miconazole   Topical BID    minoxidil  2.5 mg Oral BID    pantoprazole  40 mg Oral QAM AC    oxyBUTYnin  15 mg Oral Daily    [Held by provider] sacubitril-valsartan  1 tablet Oral BID    budesonide  0.5 mg Nebulization BID RT    arformoterol tartrate  15 mcg Nebulization BID RT    insulin glargine  6 Units SubCUTAneous Nightly     ondansetron, 4 mg, Q6H PRN  sodium chloride flush, 5-40 mL, PRN  sodium chloride, , PRN  acetaminophen, 650 mg, Q6H PRN   Or  acetaminophen, 650 mg, Q6H PRN  polyethylene glycol, 17 g, Daily PRN  dextrose bolus, 125 mL, PRN   Or  dextrose bolus, 250 mL, PRN  glucagon (rDNA), 1 mg, PRN  dextrose, , Continuous PRN  Glucose, 15 g, PRN  HYDROcodone 5 mg - acetaminophen, 1 tablet, Q6H PRN  guaiFENesin-dextromethorphan, 5 mL, Q4H PRN         Objective:    BP (!) 153/73   Pulse 72   Temp 98.9 °F (37.2 °C) (Axillary)   Resp 18   Ht 1.575 m (5' 2\")

## 2024-06-25 NOTE — PLAN OF CARE
Problem: Safety - Adult  Goal: Free from fall injury  Outcome: Progressing     Problem: Pain  Goal: Verbalizes/displays adequate comfort level or baseline comfort level  Outcome: Progressing  Flowsheets  Taken 6/24/2024 1106 by Francesca Castellon RN  Verbalizes/displays adequate comfort level or baseline comfort level:   Encourage patient to monitor pain and request assistance   Assess pain using appropriate pain scale   Administer analgesics based on type and severity of pain and evaluate response   Implement non-pharmacological measures as appropriate and evaluate response   Consider cultural and social influences on pain and pain management   Notify Licensed Independent Practitioner if interventions unsuccessful or patient reports new pain  Taken 6/24/2024 0934 by Francesca Castellon RN  Verbalizes/displays adequate comfort level or baseline comfort level:   Encourage patient to monitor pain and request assistance   Assess pain using appropriate pain scale   Administer analgesics based on type and severity of pain and evaluate response   Implement non-pharmacological measures as appropriate and evaluate response   Consider cultural and social influences on pain and pain management   Notify Licensed Independent Practitioner if interventions unsuccessful or patient reports new pain     Problem: Skin/Tissue Integrity  Goal: Absence of new skin breakdown  Description: 1.  Monitor for areas of redness and/or skin breakdown  2.  Assess vascular access sites hourly  3.  Every 4-6 hours minimum:  Change oxygen saturation probe site  4.  Every 4-6 hours:  If on nasal continuous positive airway pressure, respiratory therapy assess nares and determine need for appliance change or resting period.  Outcome: Progressing     Problem: Chronic Conditions and Co-morbidities  Goal: Patient's chronic conditions and co-morbidity symptoms are monitored and maintained or improved  Outcome: Progressing  Flowsheets (Taken 6/24/2024  0934 by Chatto, Francesca Natasha, RN)  Care Plan - Patient's Chronic Conditions and Co-Morbidity Symptoms are Monitored and Maintained or Improved:   Monitor and assess patient's chronic conditions and comorbid symptoms for stability, deterioration, or improvement   Collaborate with multidisciplinary team to address chronic and comorbid conditions and prevent exacerbation or deterioration   Update acute care plan with appropriate goals if chronic or comorbid symptoms are exacerbated and prevent overall improvement and discharge

## 2024-06-25 NOTE — PROGRESS NOTES
[facility] Quality Flow/Interdisciplinary Rounds Progress Note        Quality Flow Rounds held on June 25, 2024    Disciplines Attending:  Bedside Nurse, , , and Nursing Unit Leadership    Steph Gonzáles was admitted on 6/22/2024  9:44 PM    Anticipated Discharge Date:       Disposition:    Jadon Score:  Jadon Scale Score: 19    Readmission Risk              Risk of Unplanned Readmission:  52           Discussed patient goal for the day, patient clinical progression, and barriers to discharge.  The following Goal(s) of the Day/Commitment(s) have been identified:   iv abx, podiatry eval for unna boot.?      Maddie Underwood RN  June 25, 2024

## 2024-06-26 LAB
ANION GAP SERPL CALCULATED.3IONS-SCNC: 7 MMOL/L (ref 7–16)
BASOPHILS # BLD: 0.01 K/UL (ref 0–0.2)
BASOPHILS NFR BLD: 0 % (ref 0–2)
BUN SERPL-MCNC: 19 MG/DL (ref 6–23)
CALCIUM SERPL-MCNC: 8.1 MG/DL (ref 8.6–10.2)
CHLORIDE SERPL-SCNC: 103 MMOL/L (ref 98–107)
CO2 SERPL-SCNC: 29 MMOL/L (ref 22–29)
CREAT SERPL-MCNC: 1.4 MG/DL (ref 0.5–1)
EOSINOPHIL # BLD: 0.22 K/UL (ref 0.05–0.5)
EOSINOPHILS RELATIVE PERCENT: 3 % (ref 0–6)
ERYTHROCYTE [DISTWIDTH] IN BLOOD BY AUTOMATED COUNT: 11.9 % (ref 11.5–15)
GFR, ESTIMATED: 41 ML/MIN/1.73M2
GLUCOSE BLD-MCNC: 112 MG/DL (ref 74–99)
GLUCOSE BLD-MCNC: 114 MG/DL (ref 74–99)
GLUCOSE BLD-MCNC: 114 MG/DL (ref 74–99)
GLUCOSE BLD-MCNC: 119 MG/DL (ref 74–99)
GLUCOSE BLD-MCNC: 140 MG/DL (ref 74–99)
GLUCOSE BLD-MCNC: 165 MG/DL (ref 74–99)
GLUCOSE SERPL-MCNC: 135 MG/DL (ref 74–99)
HCT VFR BLD AUTO: 33.5 % (ref 34–48)
HGB BLD-MCNC: 10.5 G/DL (ref 11.5–15.5)
IMM GRANULOCYTES # BLD AUTO: <0.03 K/UL (ref 0–0.58)
IMM GRANULOCYTES NFR BLD: 0 % (ref 0–5)
LYMPHOCYTES NFR BLD: 1.17 K/UL (ref 1.5–4)
LYMPHOCYTES RELATIVE PERCENT: 18 % (ref 20–42)
MCH RBC QN AUTO: 31.8 PG (ref 26–35)
MCHC RBC AUTO-ENTMCNC: 31.3 G/DL (ref 32–34.5)
MCV RBC AUTO: 101.5 FL (ref 80–99.9)
MONOCYTES NFR BLD: 0.37 K/UL (ref 0.1–0.95)
MONOCYTES NFR BLD: 6 % (ref 2–12)
NEUTROPHILS NFR BLD: 73 % (ref 43–80)
NEUTS SEG NFR BLD: 4.81 K/UL (ref 1.8–7.3)
PLATELET # BLD AUTO: 239 K/UL (ref 130–450)
PMV BLD AUTO: 10 FL (ref 7–12)
POTASSIUM SERPL-SCNC: 4.7 MMOL/L (ref 3.5–5)
RBC # BLD AUTO: 3.3 M/UL (ref 3.5–5.5)
SODIUM SERPL-SCNC: 139 MMOL/L (ref 132–146)
WBC OTHER # BLD: 6.6 K/UL (ref 4.5–11.5)

## 2024-06-26 PROCEDURE — 6360000002 HC RX W HCPCS: Performed by: STUDENT IN AN ORGANIZED HEALTH CARE EDUCATION/TRAINING PROGRAM

## 2024-06-26 PROCEDURE — 80048 BASIC METABOLIC PNL TOTAL CA: CPT

## 2024-06-26 PROCEDURE — 2060000000 HC ICU INTERMEDIATE R&B

## 2024-06-26 PROCEDURE — 94640 AIRWAY INHALATION TREATMENT: CPT

## 2024-06-26 PROCEDURE — 6370000000 HC RX 637 (ALT 250 FOR IP): Performed by: INTERNAL MEDICINE

## 2024-06-26 PROCEDURE — 2580000003 HC RX 258

## 2024-06-26 PROCEDURE — 94660 CPAP INITIATION&MGMT: CPT

## 2024-06-26 PROCEDURE — 6370000000 HC RX 637 (ALT 250 FOR IP): Performed by: STUDENT IN AN ORGANIZED HEALTH CARE EDUCATION/TRAINING PROGRAM

## 2024-06-26 PROCEDURE — 6370000000 HC RX 637 (ALT 250 FOR IP)

## 2024-06-26 PROCEDURE — 2700000000 HC OXYGEN THERAPY PER DAY

## 2024-06-26 PROCEDURE — 6360000002 HC RX W HCPCS

## 2024-06-26 PROCEDURE — 85025 COMPLETE CBC W/AUTO DIFF WBC: CPT

## 2024-06-26 PROCEDURE — 82962 GLUCOSE BLOOD TEST: CPT

## 2024-06-26 PROCEDURE — 36415 COLL VENOUS BLD VENIPUNCTURE: CPT

## 2024-06-26 PROCEDURE — 99222 1ST HOSP IP/OBS MODERATE 55: CPT | Performed by: STUDENT IN AN ORGANIZED HEALTH CARE EDUCATION/TRAINING PROGRAM

## 2024-06-26 PROCEDURE — 2580000003 HC RX 258: Performed by: STUDENT IN AN ORGANIZED HEALTH CARE EDUCATION/TRAINING PROGRAM

## 2024-06-26 RX ADMIN — PANTOPRAZOLE SODIUM 40 MG: 40 TABLET, DELAYED RELEASE ORAL at 06:42

## 2024-06-26 RX ADMIN — FLUTICASONE PROPIONATE 2 SPRAY: 50 SPRAY, METERED NASAL at 10:17

## 2024-06-26 RX ADMIN — BUDESONIDE 500 MCG: 0.5 SUSPENSION RESPIRATORY (INHALATION) at 18:43

## 2024-06-26 RX ADMIN — CARVEDILOL 3.12 MG: 6.25 TABLET, FILM COATED ORAL at 10:15

## 2024-06-26 RX ADMIN — ARFORMOTEROL TARTRATE 15 MCG: 15 SOLUTION RESPIRATORY (INHALATION) at 18:43

## 2024-06-26 RX ADMIN — WATER 1000 MG: 1 INJECTION INTRAMUSCULAR; INTRAVENOUS; SUBCUTANEOUS at 06:42

## 2024-06-26 RX ADMIN — MINOXIDIL 2.5 MG: 2.5 TABLET ORAL at 20:28

## 2024-06-26 RX ADMIN — INSULIN GLARGINE 6 UNITS: 100 INJECTION, SOLUTION SUBCUTANEOUS at 20:27

## 2024-06-26 RX ADMIN — GABAPENTIN 100 MG: 100 CAPSULE ORAL at 20:28

## 2024-06-26 RX ADMIN — HYDROCODONE BITARTRATE AND ACETAMINOPHEN 1 TABLET: 5; 325 TABLET ORAL at 03:53

## 2024-06-26 RX ADMIN — MICONAZOLE NITRATE: 20 POWDER TOPICAL at 10:18

## 2024-06-26 RX ADMIN — BUDESONIDE 500 MCG: 0.5 SUSPENSION RESPIRATORY (INHALATION) at 08:21

## 2024-06-26 RX ADMIN — HYDROCODONE BITARTRATE AND ACETAMINOPHEN 1 TABLET: 5; 325 TABLET ORAL at 10:16

## 2024-06-26 RX ADMIN — ARFORMOTEROL TARTRATE 15 MCG: 15 SOLUTION RESPIRATORY (INHALATION) at 08:22

## 2024-06-26 RX ADMIN — MICONAZOLE NITRATE: 20 POWDER TOPICAL at 20:33

## 2024-06-26 RX ADMIN — PHENAZOPYRIDINE 200 MG: 100 TABLET ORAL at 10:15

## 2024-06-26 RX ADMIN — DOCUSATE SODIUM 200 MG: 100 CAPSULE, LIQUID FILLED ORAL at 20:28

## 2024-06-26 RX ADMIN — OXYBUTYNIN CHLORIDE 15 MG: 5 TABLET, EXTENDED RELEASE ORAL at 10:15

## 2024-06-26 RX ADMIN — MINOXIDIL 2.5 MG: 2.5 TABLET ORAL at 10:18

## 2024-06-26 RX ADMIN — CARVEDILOL 3.12 MG: 6.25 TABLET, FILM COATED ORAL at 16:31

## 2024-06-26 RX ADMIN — ACETAMINOPHEN 650 MG: 325 TABLET ORAL at 20:27

## 2024-06-26 RX ADMIN — SODIUM CHLORIDE, PRESERVATIVE FREE 10 ML: 5 INJECTION INTRAVENOUS at 20:29

## 2024-06-26 RX ADMIN — ISOSORBIDE MONONITRATE 60 MG: 60 TABLET, EXTENDED RELEASE ORAL at 10:15

## 2024-06-26 RX ADMIN — GABAPENTIN 100 MG: 100 CAPSULE ORAL at 10:14

## 2024-06-26 RX ADMIN — SODIUM CHLORIDE, PRESERVATIVE FREE 10 ML: 5 INJECTION INTRAVENOUS at 10:19

## 2024-06-26 RX ADMIN — ASPIRIN 81 MG: 81 TABLET, CHEWABLE ORAL at 10:15

## 2024-06-26 RX ADMIN — ONDANSETRON 4 MG: 2 INJECTION INTRAMUSCULAR; INTRAVENOUS at 16:29

## 2024-06-26 RX ADMIN — SACUBITRIL AND VALSARTAN 1 TABLET: 97; 103 TABLET, FILM COATED ORAL at 10:15

## 2024-06-26 RX ADMIN — PHENAZOPYRIDINE 200 MG: 100 TABLET ORAL at 17:08

## 2024-06-26 RX ADMIN — HYDROCODONE BITARTRATE AND ACETAMINOPHEN 1 TABLET: 5; 325 TABLET ORAL at 16:29

## 2024-06-26 RX ADMIN — DULOXETINE HYDROCHLORIDE 60 MG: 60 CAPSULE, DELAYED RELEASE ORAL at 10:15

## 2024-06-26 RX ADMIN — SACUBITRIL AND VALSARTAN 1 TABLET: 97; 103 TABLET, FILM COATED ORAL at 20:29

## 2024-06-26 RX ADMIN — PHENAZOPYRIDINE 200 MG: 100 TABLET ORAL at 11:48

## 2024-06-26 ASSESSMENT — PAIN SCALES - GENERAL
PAINLEVEL_OUTOF10: 7
PAINLEVEL_OUTOF10: 5
PAINLEVEL_OUTOF10: 3
PAINLEVEL_OUTOF10: 9
PAINLEVEL_OUTOF10: 2
PAINLEVEL_OUTOF10: 4
PAINLEVEL_OUTOF10: 5
PAINLEVEL_OUTOF10: 9
PAINLEVEL_OUTOF10: 8

## 2024-06-26 ASSESSMENT — PAIN DESCRIPTION - DESCRIPTORS
DESCRIPTORS: ACHING

## 2024-06-26 ASSESSMENT — PAIN DESCRIPTION - ONSET
ONSET: ON-GOING
ONSET: ON-GOING

## 2024-06-26 ASSESSMENT — PAIN DESCRIPTION - LOCATION
LOCATION: HEAD
LOCATION: HEAD
LOCATION: ABDOMEN;HEAD
LOCATION: LEG

## 2024-06-26 ASSESSMENT — PAIN DESCRIPTION - PAIN TYPE
TYPE: ACUTE PAIN
TYPE: ACUTE PAIN

## 2024-06-26 ASSESSMENT — PAIN DESCRIPTION - FREQUENCY
FREQUENCY: CONTINUOUS
FREQUENCY: CONTINUOUS

## 2024-06-26 NOTE — PROGRESS NOTES
06/26/24 1904   NIV Type   NIV Started/Stopped On   Equipment Type v60   Mode AVAPS   Mask Type Full face mask   Mask Size Small   Assessment   Pulse 69   Heart Rate Source Monitor   Respirations 26   SpO2 96 %   Level of Consciousness 0   Comfort Level Good   Using Accessory Muscles No   Mask Compliance Good   Skin Assessment Clean, dry, & intact   Skin Protection for O2 Device Yes   Settings/Measurements   PIP Observed 16 cm H20   CPAP/EPAP 5 cmH2O   IPAP Min 16 cmH2O   IPAP Max 26 cmH2O   Vt (Set, mL) 300 mL   Vt (Measured) 5467 mL   Rate Ordered 16   Insp Rise Time (%) 4 %   Mask Leak (lpm) 31 lpm   Patient's Home Machine No   Alarm Settings   Alarms On Y   Low Pressure (cmH2O) 6 cmH2O   High Pressure (cmH2O) 40 cmH2O   RR Low (bpm) 16   RR High (bpm) 45 br/min

## 2024-06-26 NOTE — PROGRESS NOTES
[facility] Hospitalist Progress Note    Admitting Date and Time: 6/22/2024  9:44 PM  Admit Dx: UTI (urinary tract infection) [N39.0]    Subjective:  Patient is being followed for UTI (urinary tract infection) [N39.0]     No acute events overnight.  Patient reports improving in her nausea and diarrhea.  Creatinine is improving to 1.6 today.  Blood pressure more controlled this morning    ROS: denies fever, chills, cp, sob, n/v, HA unless stated above.      phenazopyridine  200 mg Oral TID WC    enoxaparin  40 mg SubCUTAneous Daily    Unna-Flex Elastic Unna Boot  1 each Topical Once    sodium chloride flush  5-40 mL IntraVENous 2 times per day    insulin lispro  0-8 Units SubCUTAneous TID WC    insulin lispro  0-4 Units SubCUTAneous Nightly    calcium gluconate  1,000 mg IntraVENous Once    cefTRIAXone (ROCEPHIN) IV  1,000 mg IntraVENous Q24H    aspirin  81 mg Oral Daily    carvedilol  3.125 mg Oral BID WC    docusate sodium  200 mg Oral Nightly    DULoxetine  60 mg Oral Daily    fluticasone  2 spray Nasal Daily    gabapentin  100 mg Oral BID    isosorbide mononitrate  60 mg Oral Daily    linaclotide  290 mcg Oral QAM AC    miconazole   Topical BID    minoxidil  2.5 mg Oral BID    pantoprazole  40 mg Oral QAM AC    oxyBUTYnin  15 mg Oral Daily    sacubitril-valsartan  1 tablet Oral BID    budesonide  0.5 mg Nebulization BID RT    arformoterol tartrate  15 mcg Nebulization BID RT    insulin glargine  6 Units SubCUTAneous Nightly     ondansetron, 4 mg, Q6H PRN  sodium chloride flush, 5-40 mL, PRN  sodium chloride, , PRN  acetaminophen, 650 mg, Q6H PRN   Or  acetaminophen, 650 mg, Q6H PRN  polyethylene glycol, 17 g, Daily PRN  dextrose bolus, 125 mL, PRN   Or  dextrose bolus, 250 mL, PRN  glucagon (rDNA), 1 mg, PRN  dextrose, , Continuous PRN  Glucose, 15 g, PRN  HYDROcodone 5 mg - acetaminophen, 1 tablet, Q6H PRN  guaiFENesin-dextromethorphan, 5 mL, Q4H PRN         Objective:    /78   Pulse 62   Temp  98.7 °F (37.1 °C) (Oral)   Resp 18   Ht 1.575 m (5' 2\")   Wt 92.3 kg (203 lb 8 oz)   LMP 01/01/1990   SpO2 98%   BMI 37.22 kg/m²     General Appearance: alert and oriented to person, place and time and in no acute distress  Skin: warm and dry.  Right lower extremity wounds covered with dressing  Head: normocephalic and atraumatic  Eyes: pupils equal, round, and reactive to light, extraocular eye movements intact, conjunctivae normal  Neck: neck supple and non tender without mass   Pulmonary/Chest: clear to auscultation bilaterally- no wheezes, rales or rhonchi, normal air movement, no respiratory distress  Cardiovascular: normal rate, normal S1 and S2 and no carotid bruits  Abdomen: soft, non-tender, non-distended, normal bowel sounds, no masses or organomegaly  Extremities: no cyanosis, no clubbing and no edema  Neurologic: no cranial nerve deficit and speech normal        Recent Labs     06/23/24 2000 06/24/24  0436 06/25/24  0700    137 137   K 4.7 4.8 4.7   CL 98 104 103   CO2 25 23 27   BUN 29* 30* 24*   CREATININE 1.7* 1.6* 1.6*   GLUCOSE 151* 111* 85   CALCIUM 8.8 8.4* 8.1*       Recent Labs     06/24/24 0436 06/25/24  0700 06/26/24  0642   WBC 7.9 5.3 6.6   RBC 3.28* 3.16* 3.30*   HGB 10.3* 10.0* 10.5*   HCT 34.0 33.1* 33.5*   .7* 104.7* 101.5*   MCH 31.4 31.6 31.8   MCHC 30.3* 30.2* 31.3*   RDW 12.5 12.1 11.9    236 239   MPV 10.6 10.4 10.0       Radiology:       Assessment:    Principal Problem:    Hyperkalemia  Active Problems:    Chronic combined systolic and diastolic heart failure (HCC)    DAYAN and COPD overlap syndrome (HCC)    DM2 (diabetes mellitus, type 2) (HCC)    Hyperlipidemia    Acute kidney injury superimposed on CKD (HCC)    UTI (urinary tract infection)    Generalized abdominal pain    Nausea vomiting and diarrhea  Resolved Problems:    * No resolved hospital problems. *      Plan:    ALEC on CKD  -Creatinine 1.9 on admission, baseline 1.3-1.5  -Creatinine improved  to 1.6 today    Acute cystitis without hematuria  -Urine culture showed Proteus mirabilis  -Continue ceftriaxone    Nausea, vomiting and diarrhea  -C. difficile and stool culture pending  -CT abdomen pelvis is negative for acute process except for increased stool burden  -Improved    Hyperkalemia  -Potassium 5.7 on admission   -Patient received Lokelma   -Resolved    Chronic COPD  -Continue bronchodilators    VTE prophylaxis: Enoxaparin    NOTE: This report was transcribed using voice recognition software. Every effort was made to ensure accuracy; however, inadvertent computerized transcription errors may be present.  Electronically signed by Maura Walters MD on 6/26/2024 at 9:54 AM

## 2024-06-26 NOTE — PROGRESS NOTES
[facility] Quality Flow/Interdisciplinary Rounds Progress Note        Quality Flow Rounds held on June 26, 2024    Disciplines Attending:  Bedside Nurse, , , and Nursing Unit Leadership    Steph Gonzáles was admitted on 6/22/2024  9:44 PM    Anticipated Discharge Date:       Disposition:    Jadon Score:  Jadon Scale Score: 21    Readmission Risk              Risk of Unplanned Readmission:  53           Discussed patient goal for the day, patient clinical progression, and barriers to discharge.  The following Goal(s) of the Day/Commitment(s) have been identified:   podiatry plan, IV rocephin, monitor O2 sats, discharge planning.       Mason Carrera RN  June 26, 2024

## 2024-06-26 NOTE — HOME CARE
PLEASE ADD RESUMPTION OF CARE ORDERS TO CHART BEFORE PATIENT DISCHARGES.   Klisyri Counseling:  I discussed with the patient the risks of Klisyri including but not limited to erythema, scaling, itching, weeping, crusting, and pain.

## 2024-06-26 NOTE — PROGRESS NOTES
Podiatry Progress Note  6/26/2024   Stephlorraine Gonzáles       SUBJECTIVE: This is a 68 y.o. female seen bedside for right leg wound. Patient states she follows with Dr. Bear at the wound clinic. Patient denies n,v,f,c,d at this time. Patient has no other pedal complaints.       OBJECTIVE:      Pt is AAOx3    Vitals:    06/26/24 1103   BP: (!) 116/56   Pulse: 72   Resp: 16   Temp: 98.1 °F (36.7 °C)   SpO2:       EXAM:    Vascular Exam:  DP and PT pulses are palpable. CFT <5 seconds to digits. Skin temp is warm to warm from proximal to distal.     Neuro Exam: Light touch intact     Dermatologic Exam: There is a full thickness wound noted to right lateral leg. Wound is entirely granular. Wound does not probe to bone. There is no purulence, crepitus, fluctuance, malodor noted at this time. There is mild edema and discoloration to right leg consistent with veinous stasis.     MSK: Deferred         Current Facility-Administered Medications   Medication Dose Route Frequency Provider Last Rate Last Admin    phenazopyridine (PYRIDIUM) tablet 200 mg  200 mg Oral TID WC aMura Walters MD   200 mg at 06/26/24 1015    enoxaparin (LOVENOX) injection 40 mg  40 mg SubCUTAneous Daily Fani Parmar APRN - CNP   40 mg at 06/24/24 1032    ondansetron (ZOFRAN) injection 4 mg  4 mg IntraVENous Q6H PRN Maura Walters MD   4 mg at 06/25/24 1025    Unna-Flex Elastic Unna Boot MISC 1 each  1 each Topical Once Maura Walters MD        sodium chloride flush 0.9 % injection 5-40 mL  5-40 mL IntraVENous 2 times per day Fani Parmar APRN - CNP   10 mL at 06/26/24 1019    sodium chloride flush 0.9 % injection 5-40 mL  5-40 mL IntraVENous PRN Fani Parmar APRN - CNP        0.9 % sodium chloride infusion   IntraVENous PRN Fani Parmar APRN - CNP        acetaminophen (TYLENOL) tablet 650 mg  650 mg Oral Q6H PRN Addicott, Fani J, APRN - CNP   650 mg at 06/25/24 0236    Or    acetaminophen (TYLENOL)

## 2024-06-27 VITALS
HEIGHT: 62 IN | DIASTOLIC BLOOD PRESSURE: 68 MMHG | OXYGEN SATURATION: 97 % | TEMPERATURE: 98.6 F | HEART RATE: 69 BPM | WEIGHT: 195.99 LBS | RESPIRATION RATE: 18 BRPM | SYSTOLIC BLOOD PRESSURE: 154 MMHG | BODY MASS INDEX: 36.07 KG/M2

## 2024-06-27 LAB
ANION GAP SERPL CALCULATED.3IONS-SCNC: 5 MMOL/L (ref 7–16)
BASOPHILS # BLD: 0.01 K/UL (ref 0–0.2)
BASOPHILS NFR BLD: 0 % (ref 0–2)
BUN SERPL-MCNC: 20 MG/DL (ref 6–23)
CALCIUM SERPL-MCNC: 8 MG/DL (ref 8.6–10.2)
CHLORIDE SERPL-SCNC: 103 MMOL/L (ref 98–107)
CO2 SERPL-SCNC: 29 MMOL/L (ref 22–29)
CREAT SERPL-MCNC: 1.3 MG/DL (ref 0.5–1)
EOSINOPHIL # BLD: 0.21 K/UL (ref 0.05–0.5)
EOSINOPHILS RELATIVE PERCENT: 3 % (ref 0–6)
ERYTHROCYTE [DISTWIDTH] IN BLOOD BY AUTOMATED COUNT: 11.9 % (ref 11.5–15)
GFR, ESTIMATED: 47 ML/MIN/1.73M2
GLUCOSE BLD-MCNC: 107 MG/DL (ref 74–99)
GLUCOSE BLD-MCNC: 167 MG/DL (ref 74–99)
GLUCOSE SERPL-MCNC: 143 MG/DL (ref 74–99)
HCT VFR BLD AUTO: 30.9 % (ref 34–48)
HGB BLD-MCNC: 9.9 G/DL (ref 11.5–15.5)
IMM GRANULOCYTES # BLD AUTO: <0.03 K/UL (ref 0–0.58)
IMM GRANULOCYTES NFR BLD: 0 % (ref 0–5)
LYMPHOCYTES NFR BLD: 0.95 K/UL (ref 1.5–4)
LYMPHOCYTES RELATIVE PERCENT: 13 % (ref 20–42)
MCH RBC QN AUTO: 32.4 PG (ref 26–35)
MCHC RBC AUTO-ENTMCNC: 32 G/DL (ref 32–34.5)
MCV RBC AUTO: 101 FL (ref 80–99.9)
MONOCYTES NFR BLD: 0.42 K/UL (ref 0.1–0.95)
MONOCYTES NFR BLD: 6 % (ref 2–12)
NEUTROPHILS NFR BLD: 78 % (ref 43–80)
NEUTS SEG NFR BLD: 5.47 K/UL (ref 1.8–7.3)
PLATELET # BLD AUTO: 217 K/UL (ref 130–450)
PMV BLD AUTO: 10.3 FL (ref 7–12)
POTASSIUM SERPL-SCNC: 4.7 MMOL/L (ref 3.5–5)
RBC # BLD AUTO: 3.06 M/UL (ref 3.5–5.5)
SODIUM SERPL-SCNC: 137 MMOL/L (ref 132–146)
WBC OTHER # BLD: 7.1 K/UL (ref 4.5–11.5)

## 2024-06-27 PROCEDURE — 6360000002 HC RX W HCPCS: Performed by: STUDENT IN AN ORGANIZED HEALTH CARE EDUCATION/TRAINING PROGRAM

## 2024-06-27 PROCEDURE — 6370000000 HC RX 637 (ALT 250 FOR IP): Performed by: STUDENT IN AN ORGANIZED HEALTH CARE EDUCATION/TRAINING PROGRAM

## 2024-06-27 PROCEDURE — 82962 GLUCOSE BLOOD TEST: CPT

## 2024-06-27 PROCEDURE — 2580000003 HC RX 258

## 2024-06-27 PROCEDURE — 2580000003 HC RX 258: Performed by: STUDENT IN AN ORGANIZED HEALTH CARE EDUCATION/TRAINING PROGRAM

## 2024-06-27 PROCEDURE — 85025 COMPLETE CBC W/AUTO DIFF WBC: CPT

## 2024-06-27 PROCEDURE — 80048 BASIC METABOLIC PNL TOTAL CA: CPT

## 2024-06-27 PROCEDURE — 6360000002 HC RX W HCPCS

## 2024-06-27 PROCEDURE — 99239 HOSP IP/OBS DSCHRG MGMT >30: CPT | Performed by: STUDENT IN AN ORGANIZED HEALTH CARE EDUCATION/TRAINING PROGRAM

## 2024-06-27 PROCEDURE — 6370000000 HC RX 637 (ALT 250 FOR IP): Performed by: INTERNAL MEDICINE

## 2024-06-27 PROCEDURE — 94640 AIRWAY INHALATION TREATMENT: CPT

## 2024-06-27 PROCEDURE — 36415 COLL VENOUS BLD VENIPUNCTURE: CPT

## 2024-06-27 PROCEDURE — 6370000000 HC RX 637 (ALT 250 FOR IP)

## 2024-06-27 PROCEDURE — 94660 CPAP INITIATION&MGMT: CPT

## 2024-06-27 RX ORDER — PHENAZOPYRIDINE HYDROCHLORIDE 200 MG/1
200 TABLET, FILM COATED ORAL
Qty: 30 TABLET | Refills: 0 | Status: SHIPPED | OUTPATIENT
Start: 2024-06-27 | End: 2024-07-07

## 2024-06-27 RX ORDER — HYDROCODONE BITARTRATE AND ACETAMINOPHEN 5; 325 MG/1; MG/1
1 TABLET ORAL EVERY 6 HOURS PRN
Qty: 3 TABLET | Refills: 0 | Status: SHIPPED | OUTPATIENT
Start: 2024-06-27 | End: 2024-06-30

## 2024-06-27 RX ADMIN — OXYBUTYNIN CHLORIDE 15 MG: 5 TABLET, EXTENDED RELEASE ORAL at 08:21

## 2024-06-27 RX ADMIN — ISOSORBIDE MONONITRATE 60 MG: 60 TABLET, EXTENDED RELEASE ORAL at 08:23

## 2024-06-27 RX ADMIN — MINOXIDIL 2.5 MG: 2.5 TABLET ORAL at 08:22

## 2024-06-27 RX ADMIN — MICONAZOLE NITRATE: 20 POWDER TOPICAL at 08:22

## 2024-06-27 RX ADMIN — BUDESONIDE 500 MCG: 0.5 SUSPENSION RESPIRATORY (INHALATION) at 08:42

## 2024-06-27 RX ADMIN — GABAPENTIN 100 MG: 100 CAPSULE ORAL at 08:21

## 2024-06-27 RX ADMIN — DULOXETINE HYDROCHLORIDE 60 MG: 60 CAPSULE, DELAYED RELEASE ORAL at 08:21

## 2024-06-27 RX ADMIN — SODIUM CHLORIDE, PRESERVATIVE FREE 10 ML: 5 INJECTION INTRAVENOUS at 08:22

## 2024-06-27 RX ADMIN — ENOXAPARIN SODIUM 40 MG: 100 INJECTION SUBCUTANEOUS at 08:21

## 2024-06-27 RX ADMIN — CARVEDILOL 3.12 MG: 6.25 TABLET, FILM COATED ORAL at 08:21

## 2024-06-27 RX ADMIN — SACUBITRIL AND VALSARTAN 1 TABLET: 97; 103 TABLET, FILM COATED ORAL at 08:21

## 2024-06-27 RX ADMIN — ASPIRIN 81 MG: 81 TABLET, CHEWABLE ORAL at 08:21

## 2024-06-27 RX ADMIN — PHENAZOPYRIDINE 200 MG: 100 TABLET ORAL at 12:51

## 2024-06-27 RX ADMIN — PANTOPRAZOLE SODIUM 40 MG: 40 TABLET, DELAYED RELEASE ORAL at 06:41

## 2024-06-27 RX ADMIN — WATER 1000 MG: 1 INJECTION INTRAMUSCULAR; INTRAVENOUS; SUBCUTANEOUS at 06:40

## 2024-06-27 RX ADMIN — ARFORMOTEROL TARTRATE 15 MCG: 15 SOLUTION RESPIRATORY (INHALATION) at 08:42

## 2024-06-27 RX ADMIN — FLUTICASONE PROPIONATE 2 SPRAY: 50 SPRAY, METERED NASAL at 08:22

## 2024-06-27 RX ADMIN — PHENAZOPYRIDINE 200 MG: 100 TABLET ORAL at 08:23

## 2024-06-27 RX ADMIN — HYDROCODONE BITARTRATE AND ACETAMINOPHEN 1 TABLET: 5; 325 TABLET ORAL at 03:01

## 2024-06-27 RX ADMIN — ACETAMINOPHEN 650 MG: 325 TABLET ORAL at 06:40

## 2024-06-27 ASSESSMENT — PAIN SCALES - GENERAL
PAINLEVEL_OUTOF10: 10
PAINLEVEL_OUTOF10: 0
PAINLEVEL_OUTOF10: 0
PAINLEVEL_OUTOF10: 5

## 2024-06-27 ASSESSMENT — PAIN DESCRIPTION - LOCATION
LOCATION: HIP;LEG
LOCATION: LEG

## 2024-06-27 ASSESSMENT — PAIN DESCRIPTION - ORIENTATION
ORIENTATION: RIGHT
ORIENTATION: RIGHT;LEFT

## 2024-06-27 NOTE — PROGRESS NOTES
CLINICAL PHARMACY NOTE: MEDS TO BEDS    Total # of Prescriptions Filled: 2   The following medications were delivered to the patient:  Norco 5/325 mg   Pyridium 200 mg       Additional Documentation:

## 2024-06-27 NOTE — PROGRESS NOTES
Podiatry Progress Note  6/27/2024   Steph S Gonzáles       SUBJECTIVE: This is a 68 y.o. female seen bedside for right leg wound. Patient states she follows with Dr. Bear at the wound clinic. Patient denies n,v,f,c,d at this time. Patient has no other pedal complaints.       OBJECTIVE:      Pt is AAOx3    Vitals:    06/27/24 0815   BP: (!) 154/68   Pulse: 69   Resp: 18   Temp: 98.6 °F (37 °C)   SpO2: 92%      EXAM:    Vascular Exam:  DP and PT pulses are palpable. CFT <5 seconds to digits. Skin temp is warm to warm from proximal to distal.     Neuro Exam: Light touch intact     Dermatologic Exam: There is a full thickness wound noted to right lateral leg. Wound is entirely granular. Wound does not probe to bone. There is no purulence, crepitus, fluctuance, malodor noted at this time. There is mild edema and discoloration to right leg consistent with veinous stasis.     MSK: Deferred         Current Facility-Administered Medications   Medication Dose Route Frequency Provider Last Rate Last Admin    phenazopyridine (PYRIDIUM) tablet 200 mg  200 mg Oral TID  Maura Walters MD   200 mg at 06/27/24 0823    enoxaparin (LOVENOX) injection 40 mg  40 mg SubCUTAneous Daily Fani Parmar APRN - CNP   40 mg at 06/27/24 0821    ondansetron (ZOFRAN) injection 4 mg  4 mg IntraVENous Q6H PRN Maura Walters MD   4 mg at 06/26/24 1629    Unna-Flex Elastic Unna Boot MISC 1 each  1 each Topical Once Maura Walters MD        sodium chloride flush 0.9 % injection 5-40 mL  5-40 mL IntraVENous 2 times per day Fani Parmar APRN - CNP   10 mL at 06/27/24 0822    sodium chloride flush 0.9 % injection 5-40 mL  5-40 mL IntraVENous PRN Fani Parmar APRN - CNP        0.9 % sodium chloride infusion   IntraVENous PRN Fani Parmar APRN - CNP        acetaminophen (TYLENOL) tablet 650 mg  650 mg Oral Q6H PRN Addicott, Fani J, APRN - CNP   650 mg at 06/27/24 0640    Or    acetaminophen (TYLENOL)  (LINZESS) capsule 290 mcg (Patient Supplied)  290 mcg Oral QAM AC Fani Parmar APRN - CNP        miconazole (MICOTIN) 2 % powder   Topical BID Fani Parmar APRN - CNP   Given at 06/27/24 0822    minoxidil (LONITEN) tablet 2.5 mg  2.5 mg Oral BID Fani Parmar APRN - CNP   2.5 mg at 06/27/24 0822    pantoprazole (PROTONIX) tablet 40 mg  40 mg Oral QAM AC Fani Parmar APRN - CNP   40 mg at 06/27/24 0641    oxyBUTYnin (DITROPAN-XL) extended release tablet 15 mg  15 mg Oral Daily Fani Parmar APRN - CNP   15 mg at 06/27/24 0821    sacubitril-valsartan (ENTRESTO)  MG per tablet 1 tablet  1 tablet Oral BID Fani Parmar APRN - CNP   1 tablet at 06/27/24 0821    budesonide (PULMICORT) nebulizer suspension 500 mcg  0.5 mg Nebulization BID RT Fani Parmar APRN - CNP   500 mcg at 06/27/24 0842    arformoterol tartrate (BROVANA) nebulizer solution 15 mcg  15 mcg Nebulization BID RT Fani Parmar APRN - CNP   15 mcg at 06/27/24 0842    insulin glargine (LANTUS) injection vial 6 Units  6 Units SubCUTAneous Nightly Fani Parmar APRN - CNP   6 Units at 06/26/24 2027    HYDROcodone-acetaminophen (NORCO) 5-325 MG per tablet 1 tablet  1 tablet Oral Q6H PRN Santana Davis MD   1 tablet at 06/27/24 0301    guaiFENesin-dextromethorphan (ROBITUSSIN DM) 100-10 MG/5ML syrup 5 mL  5 mL Oral Q4H PRN Santana Davis MD   5 mL at 06/25/24 1503        Lab Results   Component Value Date    WBC 6.6 06/26/2024    HCT 33.5 (L) 06/26/2024    HGB 10.5 (L) 06/26/2024     06/26/2024     06/26/2024    K 4.7 06/26/2024     06/26/2024    CO2 29 06/26/2024    BUN 19 06/26/2024    CREATININE 1.4 (H) 06/26/2024    GLUCOSE 135 (H) 06/26/2024    CRP 1.1 (H) 08/24/2022     ASSESSMENT:  - Right leg wound, chronic, POA, Stable  - Veinous stasis        PLAN:  - Patient was assessed and all findings discussed with the patient  - Wbc 6.6  - Dressed right leg with xeroform,  gabe durand. Dressings to be managed by podiatry going forward.  - No plans for surgical intervention per podiatry at this time.  - Patient is okay for discharge per podiatry once cleared by other teams.  - Discussed with Dr. Chema Simmons DPM PGY-2  6/27/2024   10:53 AM

## 2024-06-27 NOTE — DISCHARGE SUMMARY
With IV ceftriaxone         [facility] Hospitalist Physician Discharge Summary       [place] [id number]  [phone]          Activity level: As tolerated     Dispo: Home    Condition on discharge: Stable     Patient ID:  Steph Gonzáles  [id number]  68 y.o.  1955    Admit date: 6/22/2024    Discharge date and time:  6/27/2024  11:00 AM    Admission Diagnoses: Principal Problem:    Hyperkalemia  Active Problems:    Chronic combined systolic and diastolic heart failure (HCC)    DAYAN and COPD overlap syndrome (HCC)    DM2 (diabetes mellitus, type 2) (HCC)    Hyperlipidemia    Acute kidney injury superimposed on CKD (HCC)    UTI (urinary tract infection)    Generalized abdominal pain    Nausea vomiting and diarrhea  Resolved Problems:    * No resolved hospital problems. *      Discharge Diagnoses: Principal Problem:    Hyperkalemia  Active Problems:    Chronic combined systolic and diastolic heart failure (HCC)    DAYAN and COPD overlap syndrome (HCC)    DM2 (diabetes mellitus, type 2) (HCC)    Hyperlipidemia    Acute kidney injury superimposed on CKD (HCC)    UTI (urinary tract infection)    Generalized abdominal pain    Nausea vomiting and diarrhea  Resolved Problems:    * No resolved hospital problems. *      Consults:  IP CONSULT TO VASCULAR ACCESS TEAM  IP CONSULT TO VASCULAR ACCESS TEAM  IP CONSULT TO PODIATRY    Procedures: None    Hospital Course:   Patient Steph Gonzáles is a 68 y.o. presented with UTI (urinary tract infection) [N39.0]    68-year-old female was admitted for evaluation of and she received IV ceftriaxone.  Patient is medically stable for discharge home today.    Discharge Exam:  General Appearance: alert and oriented to person, place and time and in no acute distress  Skin: warm and dry  Head: normocephalic and atraumatic  Eyes: pupils equal, round, and reactive to light, extraocular eye movements intact, conjunctivae normal  Neck: neck  some atelectasis and/or scar.  Mild alveolitis/pneumonitis is not excluded but no focal discrete typical pulmonary infiltrate is noted. Pleural space:  No acute findings.  No pneumothorax.  No significant effusion. Heart:  Mild stable cardiomegaly.  No significant pericardial effusion.  No significant coronary artery calcifications. Bones/joints:  Prior median sternotomy.  Multilevel degenerative changes of the spine. No definite acute bony abnormality is noted.  No dislocation. Soft tissues:  No acute findings. Vasculature:  Scattered calcified atherosclerotic plaque is noted within the arterial system.  No thoracic aortic aneurysm. Lymph nodes:  No acute findings.  No enlarged lymph nodes. Gallbladder and bile ducts:  Prior cholecystectomy. Kidneys and ureters:  Degree of left renal atrophy, unchanged.     1.  Mild stable cardiomegaly. 2.  Some hazy, patchy ground-glass opacity within both lower lobes and in the inferior, posterior aspect of both upper lobes some of the areas similar to the prior study.  This could be on the basis of vascular congestion combined with some atelectasis and/or scar.  Mild alveolitis/pneumonitis is not excluded but no focal discrete typical pulmonary infiltrate is noted. 3.  Otherwise no acute pathology or significant change noted.     XR HAND LEFT (MIN 3 VIEWS)    Result Date: 6/22/2024  EXAM: XR Left Hand Complete, 3 or More Views EXAM DATE/TIME: 6/22/2024 12:33 pm CLINICAL HISTORY: ORDERING SYSTEM PROVIDED HISTORY: left hand cramps  TECHNOLOGIST PROVIDED HISTORY:  Reason for exam:->left hand cramps TECHNIQUE: Frontal, lateral and oblique views of the left hand. COMPARISON: 05/27/2023 FINDINGS: Bones/joints:  Minimal osteoarthritic change of the interphalangeal joint of the thumb.  No acute fracture.  No dislocation. Soft tissues:  No acute findings.  No radiopaque foreign body.     Minimal osteoarthritic change of the interphalangeal joint of the thumb.  No evidence of acute  fracture or traumatic malalignment.     XR CHEST PORTABLE    Result Date: 6/22/2024  EXAMINATION: ONE XRAY VIEW OF THE CHEST 6/22/2024 12:23 pm COMPARISON: 05/28/2024 HISTORY: ORDERING SYSTEM PROVIDED HISTORY: sob TECHNOLOGIST PROVIDED HISTORY: Reason for exam:->sob FINDINGS: The heart is enlarged.  Postoperative sternotomy changes are noted Evaluation of the lung bases is limited due to the patient's body habitus, portable technique and overlying breast tissue.  The upper lobes are clear.     1. There is no gross acute cardiopulmonary disease 2. Limited evaluation of the lung bases due to the patient's body habitus, portable technique and overlying breast tissue 3. Cardiomegaly.       Patient Instructions:      Medication List        START taking these medications      HYDROcodone-acetaminophen 5-325 MG per tablet  Commonly known as: NORCO  Take 1 tablet by mouth every 6 hours as needed for Pain for up to 3 days. Max Daily Amount: 4 tablets     phenazopyridine 200 MG tablet  Commonly known as: PYRIDIUM  Take 1 tablet by mouth 3 times daily (with meals) for 10 days            CONTINUE taking these medications      aspirin 81 MG chewable tablet  Take 1 tablet by mouth daily     Azelastine HCl 137 MCG/SPRAY Soln  USE 2 SPRAYS IN EACH NOSTRIL IN THE MORNING AND AT BEDTIME AS DIRECTED     budesonide-formoterol 160-4.5 MCG/ACT Aero  Commonly known as: SYMBICORT     carvedilol 3.125 MG tablet  Commonly known as: COREG  Take 1 tablet by mouth 2 times daily (with meals)     docusate sodium 100 MG capsule  Commonly known as: COLACE     DULoxetine 60 MG extended release capsule  Commonly known as: CYMBALTA     fluticasone 50 MCG/ACT nasal spray  Commonly known as: FLONASE  2 sprays by Nasal route daily 2 sprays each nostril once daily     gabapentin 100 MG capsule  Commonly known as: NEURONTIN     insulin lispro 100 UNIT/ML Soln injection vial  Commonly known as: HUMALOG,ADMELOG     isosorbide mononitrate 60 MG extended

## 2024-06-27 NOTE — PROGRESS NOTES
[facility] Quality Flow/Interdisciplinary Rounds Progress Note        Quality Flow Rounds held on June 27, 2024    Disciplines Attending:  Bedside Nurse, , , and Nursing Unit Leadership    Steph Gonzáles was admitted on 6/22/2024  9:44 PM    Anticipated Discharge Date:       Disposition:    Jadon Score:  Jadon Scale Score: 20    Readmission Risk              Risk of Unplanned Readmission:  49           Discussed patient goal for the day, patient clinical progression, and barriers to discharge.  The following Goal(s) of the Day/Commitment(s) have been identified:   iv abx finish today, d/c after      aMddie Underwood RN  June 27, 2024

## 2024-06-28 ENCOUNTER — TELEPHONE (OUTPATIENT)
Dept: CARDIOLOGY CLINIC | Age: 69
End: 2024-06-28

## 2024-06-28 LAB — CALPROTECTIN, FECAL: 207 UG/G

## 2024-06-28 RX ORDER — HYDRALAZINE HYDROCHLORIDE 25 MG/1
25 TABLET, FILM COATED ORAL 3 TIMES DAILY
COMMUNITY

## 2024-06-28 NOTE — TELEPHONE ENCOUNTER
Patient called stating she was in the hospital last week.  She states her Hydralazine was discontinued.   She is concerned because her BP is now 142/71.

## 2024-06-28 NOTE — TELEPHONE ENCOUNTER
She can restart hydralazine 25 mg p.o. 3 times a day and monitor blood pressure daily and call us in 1 week

## 2024-07-02 NOTE — DISCHARGE INSTRUCTIONS
Visit Discharge/Physician Orders     Discharge condition: Stable     Assessment of pain at discharge: yes     Anesthetic used:  lidocaine 4%      Discharge to: Home     Left via:Private automobile     Accompanied by: accompanied by none     [facility]     Dressing Orders:Cleanse wound to right leg with saline apply alginate ag, unna boot (wrap from base of the toes to 2 fingers below the knee)     Keep wrap dry at all times. If wrap becomes wet, becomes painful or falls 2 inches- may remove wrap- do daily dressing changes and apply jammie and coban     Treatment Orders:US of right leg   EAT DIET WITH PROTEINS AND VITAMIN C  TAKE A MULTIVITAMIN DAILY IF NOT CONTRAINDICATED          [facility] followup visit _____2 weeks ________  (Please note your next appointment above and if you are unable to keep, kindly give a 24 hour notice. Thank you.)     Physician signature:__________________________        If you experience any of the following, please call the Wound Care Center during business hours:     * Increase in Pain  * Temperature over 101  * Increase in drainage from your wound  * Drainage with a foul odor  * Bleeding  * Increase in swelling  * Need for compression bandage changes due to slippage, breakthrough drainage.     If you need medical attention outside of the business hours of the Wound Care Centers please contact your PCP or go to the nearest emergency room.

## 2024-07-03 ENCOUNTER — HOSPITAL ENCOUNTER (OUTPATIENT)
Dept: WOUND CARE | Age: 69
Discharge: HOME | End: 2024-07-03
Attending: PODIATRIST
Payer: MEDICARE

## 2024-07-03 ENCOUNTER — HOSPITAL ENCOUNTER (OUTPATIENT)
Age: 69
Discharge: HOME | End: 2024-07-03
Payer: MEDICARE

## 2024-07-03 VITALS
HEART RATE: 82 BPM | TEMPERATURE: 97.9 F | BODY MASS INDEX: 32.11 KG/M2 | RESPIRATION RATE: 16 BRPM | HEIGHT: 62 IN | DIASTOLIC BLOOD PRESSURE: 80 MMHG | SYSTOLIC BLOOD PRESSURE: 128 MMHG | WEIGHT: 174.5 LBS

## 2024-07-03 DIAGNOSIS — I70.232 ATHEROSCLEROSIS OF NATIVE ARTERY OF RIGHT LOWER EXTREMITY WITH ULCERATION OF CALF (HCC): Primary | ICD-10-CM

## 2024-07-03 DIAGNOSIS — L97.211 LOWER LIMB ULCER, CALF, RIGHT, LIMITED TO BREAKDOWN OF SKIN (HCC): ICD-10-CM

## 2024-07-03 DIAGNOSIS — L97.212 LOWER LIMB ULCER, CALF, RIGHT, WITH FAT LAYER EXPOSED (HCC): ICD-10-CM

## 2024-07-03 LAB
ALBUMIN SERPL-MCNC: 3.7 G/DL (ref 3.5–5.2)
ALP SERPL-CCNC: 71 U/L (ref 35–104)
ALT SERPL-CCNC: 15 U/L (ref 0–32)
ANION GAP SERPL CALCULATED.3IONS-SCNC: 9 MMOL/L (ref 7–16)
AST SERPL-CCNC: 13 U/L (ref 0–31)
BASOPHILS # BLD: 0.02 K/UL (ref 0–0.2)
BASOPHILS NFR BLD: 0 % (ref 0–2)
BILIRUB SERPL-MCNC: 0.4 MG/DL (ref 0–1.2)
BUN SERPL-MCNC: 22 MG/DL (ref 6–23)
CALCIUM SERPL-MCNC: 8.5 MG/DL (ref 8.6–10.2)
CHLORIDE SERPL-SCNC: 104 MMOL/L (ref 98–107)
CO2 SERPL-SCNC: 26 MMOL/L (ref 22–29)
CREAT SERPL-MCNC: 1.4 MG/DL (ref 0.5–1)
CREAT UR-MCNC: 58.1 MG/DL (ref 29–226)
EOSINOPHIL # BLD: 0.13 K/UL (ref 0.05–0.5)
EOSINOPHILS RELATIVE PERCENT: 2 % (ref 0–6)
ERYTHROCYTE [DISTWIDTH] IN BLOOD BY AUTOMATED COUNT: 12.2 % (ref 11.5–15)
GFR, ESTIMATED: 40 ML/MIN/1.73M2
GLUCOSE SERPL-MCNC: 138 MG/DL (ref 74–99)
HBA1C MFR BLD: 5 % (ref 4–5.6)
HCT VFR BLD AUTO: 33.5 % (ref 34–48)
HGB BLD-MCNC: 10.1 G/DL (ref 11.5–15.5)
IMM GRANULOCYTES # BLD AUTO: <0.03 K/UL (ref 0–0.58)
IMM GRANULOCYTES NFR BLD: 0 % (ref 0–5)
LYMPHOCYTES NFR BLD: 1.21 K/UL (ref 1.5–4)
LYMPHOCYTES RELATIVE PERCENT: 19 % (ref 20–42)
MCH RBC QN AUTO: 32.4 PG (ref 26–35)
MCHC RBC AUTO-ENTMCNC: 30.1 G/DL (ref 32–34.5)
MCV RBC AUTO: 107.4 FL (ref 80–99.9)
MICROALBUMIN UR-MCNC: 96 MG/L (ref 0–19)
MICROALBUMIN/CREAT UR-RTO: 164 MCG/MG CREAT (ref 0–30)
MONOCYTES NFR BLD: 0.27 K/UL (ref 0.1–0.95)
MONOCYTES NFR BLD: 4 % (ref 2–12)
NEUTROPHILS NFR BLD: 74 % (ref 43–80)
NEUTS SEG NFR BLD: 4.6 K/UL (ref 1.8–7.3)
PHOSPHATE SERPL-MCNC: 2.9 MG/DL (ref 2.5–4.5)
PLATELET # BLD AUTO: 244 K/UL (ref 130–450)
PMV BLD AUTO: 10.1 FL (ref 7–12)
POTASSIUM SERPL-SCNC: 4.7 MMOL/L (ref 3.5–5)
PROT SERPL-MCNC: 6.4 G/DL (ref 6.4–8.3)
RBC # BLD AUTO: 3.12 M/UL (ref 3.5–5.5)
SODIUM SERPL-SCNC: 139 MMOL/L (ref 132–146)
WBC OTHER # BLD: 6.3 K/UL (ref 4.5–11.5)

## 2024-07-03 PROCEDURE — 80053 COMPREHEN METABOLIC PANEL: CPT

## 2024-07-03 PROCEDURE — 11042 DBRDMT SUBQ TIS 1ST 20SQCM/<: CPT

## 2024-07-03 PROCEDURE — 82043 UR ALBUMIN QUANTITATIVE: CPT

## 2024-07-03 PROCEDURE — 11042 DBRDMT SUBQ TIS 1ST 20SQCM/<: CPT | Performed by: SURGERY

## 2024-07-03 PROCEDURE — 85025 COMPLETE CBC W/AUTO DIFF WBC: CPT

## 2024-07-03 PROCEDURE — 82570 ASSAY OF URINE CREATININE: CPT

## 2024-07-03 PROCEDURE — 83036 HEMOGLOBIN GLYCOSYLATED A1C: CPT

## 2024-07-03 PROCEDURE — 84100 ASSAY OF PHOSPHORUS: CPT

## 2024-07-03 PROCEDURE — 36415 COLL VENOUS BLD VENIPUNCTURE: CPT

## 2024-07-03 RX ORDER — GENTAMICIN SULFATE 1 MG/G
OINTMENT TOPICAL ONCE
OUTPATIENT
Start: 2024-07-03 | End: 2024-07-03

## 2024-07-03 RX ORDER — GINSENG 100 MG
CAPSULE ORAL ONCE
OUTPATIENT
Start: 2024-07-03 | End: 2024-07-03

## 2024-07-03 RX ORDER — BETAMETHASONE DIPROPIONATE 0.5 MG/G
CREAM TOPICAL ONCE
OUTPATIENT
Start: 2024-07-03 | End: 2024-07-03

## 2024-07-03 RX ORDER — LIDOCAINE HYDROCHLORIDE 40 MG/ML
SOLUTION TOPICAL ONCE
Status: COMPLETED | OUTPATIENT
Start: 2024-07-03 | End: 2024-07-03

## 2024-07-03 RX ORDER — BACITRACIN ZINC AND POLYMYXIN B SULFATE 500; 1000 [USP'U]/G; [USP'U]/G
OINTMENT TOPICAL ONCE
OUTPATIENT
Start: 2024-07-03 | End: 2024-07-03

## 2024-07-03 RX ORDER — CLOBETASOL PROPIONATE 0.5 MG/G
OINTMENT TOPICAL ONCE
OUTPATIENT
Start: 2024-07-03 | End: 2024-07-03

## 2024-07-03 RX ORDER — IBUPROFEN 200 MG
TABLET ORAL ONCE
OUTPATIENT
Start: 2024-07-03 | End: 2024-07-03

## 2024-07-03 RX ORDER — LIDOCAINE HYDROCHLORIDE 40 MG/ML
SOLUTION TOPICAL ONCE
OUTPATIENT
Start: 2024-07-03 | End: 2024-07-03

## 2024-07-03 RX ORDER — LIDOCAINE 40 MG/G
CREAM TOPICAL ONCE
OUTPATIENT
Start: 2024-07-03 | End: 2024-07-03

## 2024-07-03 RX ORDER — LIDOCAINE HYDROCHLORIDE 20 MG/ML
JELLY TOPICAL ONCE
OUTPATIENT
Start: 2024-07-03 | End: 2024-07-03

## 2024-07-03 RX ORDER — LIDOCAINE 50 MG/G
OINTMENT TOPICAL ONCE
OUTPATIENT
Start: 2024-07-03 | End: 2024-07-03

## 2024-07-03 RX ORDER — SODIUM CHLOR/HYPOCHLOROUS ACID 0.033 %
SOLUTION, IRRIGATION IRRIGATION ONCE
OUTPATIENT
Start: 2024-07-03 | End: 2024-07-03

## 2024-07-03 RX ORDER — TRIAMCINOLONE ACETONIDE 1 MG/G
OINTMENT TOPICAL ONCE
OUTPATIENT
Start: 2024-07-03 | End: 2024-07-03

## 2024-07-03 RX ADMIN — LIDOCAINE HYDROCHLORIDE 4 ML: 40 SOLUTION TOPICAL at 09:39

## 2024-07-03 ASSESSMENT — PAIN - FUNCTIONAL ASSESSMENT: PAIN_FUNCTIONAL_ASSESSMENT: ACTIVITIES ARE NOT PREVENTED

## 2024-07-03 ASSESSMENT — PAIN SCALES - GENERAL
PAINLEVEL_OUTOF10: 4
PAINLEVEL_OUTOF10: 9

## 2024-07-03 ASSESSMENT — PAIN DESCRIPTION - DESCRIPTORS
DESCRIPTORS: THROBBING
DESCRIPTORS: THROBBING

## 2024-07-03 ASSESSMENT — PAIN DESCRIPTION - LOCATION
LOCATION: LEG
LOCATION: LEG

## 2024-07-03 ASSESSMENT — PAIN DESCRIPTION - ORIENTATION
ORIENTATION: RIGHT
ORIENTATION: RIGHT

## 2024-07-03 NOTE — PROGRESS NOTES
limited to breakdown of skin (HCC) 03/06/2024    Lower limb ulcer, calf, right, with fat layer exposed (HCC) 04/01/2024    Morbid obesity ([facility])     Movement disorder     Myocardial infarction ([facility]) 2011    O2 dependent 09/16/2021    with bipap 3 liters    Osteoarthritis, generalized     Pain in both lower legs 10/11/2017    Peripheral vascular disease (HCC)     Pneumonia 01/26/2014    Pulmonary edema     resolved    PVD (peripheral vascular disease) with claudication ([facility]) 08/30/2017    Sleep apnea     uses BI PAP    Status post peripheral artery angioplasty 10/31/2019    Tinea pedis of both feet 04/15/2024    Tobacco abuse     Tobacco abuse 10/11/2017    Tobacco dependence 06/30/2022    Tubular adenoma polyp of rectum     Urinary tract infection due to ESBL Klebsiella 03/08/2017    Ventral hernia      Past Surgical History:   Procedure Laterality Date    ANGIOPLASTY  11/13/2018    Dr. Gregg DCB & athrectomy L SFA & Popliteal    BREAST SURGERY  2000    bilateral reduction    BRONCHIAL BRUSH BIOPSY  1/25/2013    Dr Villegas    CARDIAC CATHETERIZATION  04/20/2015    Dr. Cardoso    CARDIAC SURGERY  12/2011     DR. ROCHA, [facility],  follows Dr Crawford    CHOLECYSTECTOMY  YRS AGO    OPEN    COLONOSCOPY  11/15/2013    Dr Borja    COLONOSCOPY  12/23/2016    Dr Borja-tubular adenoma & hyperplastic polyps, melanosis coli (repeat one year 12/2017)    CORONARY ARTERY BYPASS GRAFT      ECHO COMPLETE  10/9/2013         ENDOSCOPY, COLON, DIAGNOSTIC  04/18/2017    EP DEVICE PROCEDURE N/A 5/10/2024    Loop recorder insert performed by García Ocasio MD at [facility] CARDIAC CATH LAB    GALLBLADDER SURGERY      gallstones removed, [facility] 8/2017    HYSTERECTOMY (CERVIX STATUS UNKNOWN)      JOINT REPLACEMENT  2011    LEFT KNEE    KNEE SURGERY      left knee replacement    LARYNGOSCOPY N/A 4/4/2022    DIRECT LARYNGOSCOPY WITH BIOPSY performed by Jose Diaz DO at [facility] OR    LARYNGOSCOPY Bilateral 10/4/2023    DIRECT LARYNGOSCOPY

## 2024-07-03 NOTE — PLAN OF CARE
Problem: Cognitive:  Goal: Knowledge of wound care  Description: Knowledge of wound care  Outcome: Progressing  Goal: Understands risk factors for wounds  Description: Understands risk factors for wounds  Outcome: Progressing     Problem: Pressure Ulcer:  Goal: Absence of new pressure ulcer  Description: Absence of new pressure ulcer  Outcome: Progressing

## 2024-07-10 ENCOUNTER — TELEPHONE (OUTPATIENT)
Dept: CARDIOLOGY CLINIC | Age: 69
End: 2024-07-10

## 2024-07-10 NOTE — TELEPHONE ENCOUNTER
Patient called with BP/P readings since restarting Hydralazine 25 mg TID on 6/28/24.      146/96 (109)  176/83 (90)  161/92 (97)  174/101 (95)     152/65 (88)  142/86 (104) today     170/98 (94) today at nephrology office.  Nephrology increased Hydralazine to 50 mg TID and instructed patient to call them next week with her readings.

## 2024-07-10 NOTE — TELEPHONE ENCOUNTER
Called patient to obtain BP/P readings since restarting Hydralazine 25 mg TID on 6/28/24.  She could not get her machine to pull up the previous readings.  She will work on getting them and will call our office.

## 2024-07-11 NOTE — TELEPHONE ENCOUNTER
Agree with increasing hydralazine to 50 mg p.o. 3 times a day and continue rest of the current medications including carvedilol, isosorbide and Entresto.  Please request the patient to cut down daily salt intake to less than 2 g.

## 2024-07-14 DIAGNOSIS — J30.1 SEASONAL ALLERGIC RHINITIS DUE TO POLLEN: Primary | ICD-10-CM

## 2024-07-15 RX ORDER — SACUBITRIL AND VALSARTAN 97; 103 MG/1; MG/1
1 TABLET, FILM COATED ORAL 2 TIMES DAILY
Qty: 180 TABLET | Refills: 3 | Status: SHIPPED | OUTPATIENT
Start: 2024-07-15

## 2024-07-15 NOTE — DISCHARGE INSTRUCTIONS
Visit Discharge/Physician Orders     Discharge condition: Stable     Assessment of pain at discharge: yes     Anesthetic used:  lidocaine 4%      Discharge to: Home     Left via:Private automobile     Accompanied by: accompanied by none     [facility]     Dressing Orders:Cleanse wound to right leg with saline apply alginate ag, unna boot (fan and wrap from base of the toes to 2 fingers below the knee)     Keep wrap dry at all times. If wrap becomes wet, becomes painful or falls 2 inches- may remove wrap- do daily dressing changes and apply jammie and coban     Treatment Orders:US of right leg   EAT DIET WITH PROTEINS AND VITAMIN C  TAKE A MULTIVITAMIN DAILY IF NOT CONTRAINDICATED          [facility] followup visit _____1 week ________  (Please note your next appointment above and if you are unable to keep, kindly give a 24 hour notice. Thank you.)     Physician signature:__________________________        If you experience any of the following, please call the Wound Care Center during business hours:     * Increase in Pain  * Temperature over 101  * Increase in drainage from your wound  * Drainage with a foul odor  * Bleeding  * Increase in swelling  * Need for compression bandage changes due to slippage, breakthrough drainage.     If you need medical attention outside of the business hours of the Wound Care Centers please contact your PCP or go to the nearest emergency room.

## 2024-07-16 RX ORDER — CARVEDILOL 3.12 MG/1
3.12 TABLET ORAL 2 TIMES DAILY WITH MEALS
Qty: 180 TABLET | Refills: 0 | Status: SHIPPED | OUTPATIENT
Start: 2024-07-16 | End: 2024-10-14

## 2024-07-16 NOTE — TELEPHONE ENCOUNTER
Fax from [facility] for Carvedilol  Next visit 10/29/24 with Karla

## 2024-07-17 ENCOUNTER — HOSPITAL ENCOUNTER (OUTPATIENT)
Dept: WOUND CARE | Age: 69
Discharge: HOME | End: 2024-07-17
Attending: PODIATRIST
Payer: MEDICARE

## 2024-07-17 VITALS
RESPIRATION RATE: 20 BRPM | TEMPERATURE: 97.5 F | HEIGHT: 62 IN | SYSTOLIC BLOOD PRESSURE: 138 MMHG | WEIGHT: 176 LBS | HEART RATE: 99 BPM | DIASTOLIC BLOOD PRESSURE: 70 MMHG | BODY MASS INDEX: 32.39 KG/M2

## 2024-07-17 DIAGNOSIS — I70.232 ATHEROSCLEROSIS OF NATIVE ARTERY OF RIGHT LOWER EXTREMITY WITH ULCERATION OF CALF (HCC): Primary | ICD-10-CM

## 2024-07-17 DIAGNOSIS — L97.212 LOWER LIMB ULCER, CALF, RIGHT, WITH FAT LAYER EXPOSED (HCC): ICD-10-CM

## 2024-07-17 DIAGNOSIS — L97.211 LOWER LIMB ULCER, CALF, RIGHT, LIMITED TO BREAKDOWN OF SKIN (HCC): ICD-10-CM

## 2024-07-17 PROCEDURE — 11042 DBRDMT SUBQ TIS 1ST 20SQCM/<: CPT

## 2024-07-17 RX ORDER — CLOBETASOL PROPIONATE 0.5 MG/G
OINTMENT TOPICAL ONCE
OUTPATIENT
Start: 2024-07-17 | End: 2024-07-17

## 2024-07-17 RX ORDER — LIDOCAINE 50 MG/G
OINTMENT TOPICAL ONCE
OUTPATIENT
Start: 2024-07-17 | End: 2024-07-17

## 2024-07-17 RX ORDER — LIDOCAINE HYDROCHLORIDE 40 MG/ML
SOLUTION TOPICAL ONCE
Status: COMPLETED | OUTPATIENT
Start: 2024-07-17 | End: 2024-07-17

## 2024-07-17 RX ORDER — TRIAMCINOLONE ACETONIDE 1 MG/G
OINTMENT TOPICAL ONCE
OUTPATIENT
Start: 2024-07-17 | End: 2024-07-17

## 2024-07-17 RX ORDER — LIDOCAINE 40 MG/G
CREAM TOPICAL ONCE
OUTPATIENT
Start: 2024-07-17 | End: 2024-07-17

## 2024-07-17 RX ORDER — BETAMETHASONE DIPROPIONATE 0.5 MG/G
CREAM TOPICAL ONCE
OUTPATIENT
Start: 2024-07-17 | End: 2024-07-17

## 2024-07-17 RX ORDER — IBUPROFEN 200 MG
TABLET ORAL ONCE
OUTPATIENT
Start: 2024-07-17 | End: 2024-07-17

## 2024-07-17 RX ORDER — BACITRACIN ZINC AND POLYMYXIN B SULFATE 500; 1000 [USP'U]/G; [USP'U]/G
OINTMENT TOPICAL ONCE
OUTPATIENT
Start: 2024-07-17 | End: 2024-07-17

## 2024-07-17 RX ORDER — LIDOCAINE HYDROCHLORIDE 20 MG/ML
JELLY TOPICAL ONCE
OUTPATIENT
Start: 2024-07-17 | End: 2024-07-17

## 2024-07-17 RX ORDER — GENTAMICIN SULFATE 1 MG/G
OINTMENT TOPICAL ONCE
OUTPATIENT
Start: 2024-07-17 | End: 2024-07-17

## 2024-07-17 RX ORDER — LIDOCAINE HYDROCHLORIDE 40 MG/ML
SOLUTION TOPICAL ONCE
OUTPATIENT
Start: 2024-07-17 | End: 2024-07-17

## 2024-07-17 RX ORDER — GINSENG 100 MG
CAPSULE ORAL ONCE
OUTPATIENT
Start: 2024-07-17 | End: 2024-07-17

## 2024-07-17 RX ORDER — SODIUM CHLOR/HYPOCHLOROUS ACID 0.033 %
SOLUTION, IRRIGATION IRRIGATION ONCE
OUTPATIENT
Start: 2024-07-17 | End: 2024-07-17

## 2024-07-17 RX ADMIN — LIDOCAINE HYDROCHLORIDE 5 ML: 40 SOLUTION TOPICAL at 09:56

## 2024-07-17 ASSESSMENT — PAIN - FUNCTIONAL ASSESSMENT: PAIN_FUNCTIONAL_ASSESSMENT: ACTIVITIES ARE NOT PREVENTED

## 2024-07-17 ASSESSMENT — PAIN SCALES - GENERAL: PAINLEVEL_OUTOF10: 10

## 2024-07-17 ASSESSMENT — PAIN DESCRIPTION - FREQUENCY: FREQUENCY: CONTINUOUS

## 2024-07-17 ASSESSMENT — PAIN DESCRIPTION - LOCATION: LOCATION: LEG

## 2024-07-17 ASSESSMENT — PAIN DESCRIPTION - ONSET: ONSET: ON-GOING

## 2024-07-17 ASSESSMENT — PAIN DESCRIPTION - ORIENTATION: ORIENTATION: RIGHT

## 2024-07-17 ASSESSMENT — PAIN DESCRIPTION - DESCRIPTORS: DESCRIPTORS: THROBBING

## 2024-07-17 NOTE — PROGRESS NOTES
Degludec (TRESIBA FLEXTOUCH) 200 UNIT/ML SOPN Inject 10 Units into the skin at bedtime      traZODone (DESYREL) 100 MG tablet Take 1.5 tablets by mouth nightly      vitamin D (CHOLECALCIFEROL) 25 MCG (1000 UT) TABS tablet Take 1 tablet by mouth daily      docusate sodium (COLACE) 100 MG capsule Take 2 capsules by mouth at bedtime       No current facility-administered medications on file prior to encounter.       REVIEW OF SYSTEMS See HPI    Objective:    BP (!) 149/71   Pulse 82   Temp 96.9 °F (36.1 °C) (Temporal)   Resp 20   Ht 1.575 m (5' 2\")   Wt 80.7 kg (178 lb)   LMP 01/01/1990   BMI 32.56 kg/m²   Wt Readings from Last 3 Encounters:   06/12/24 80.7 kg (178 lb)   06/05/24 80.7 kg (178 lb)   05/28/24 80.7 kg (178 lb)     PHYSICAL EXAM  CONSTITUTIONAL:   Awake, alert, cooperative   EYES:  lids and lashes normal   ENT: external ears and nose without lesions   NECK:  supple, symmetrical, trachea midline   SKIN:  Open wound Present    Assessment:     Problem List Items Addressed This Visit       Lower limb ulcer, calf, right, limited to breakdown of skin (HCC)    Relevant Orders    Initiate Outpatient Wound Care Protocol    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) - Primary    Relevant Orders    Initiate Outpatient Wound Care Protocol    Lower limb ulcer, calf, right, with fat layer exposed (HCC)    Relevant Orders    Initiate Outpatient Wound Care Protocol       Pre Debridement Measurements:  Are located in the Wound/Ulcer Documentation Flow Sheet  Post Debridement Measurements:  Wound/Ulcer Descriptions are Pre Debridement except measurements:    Wound 12/08/23 Breast Lateral;Right (Active)   Number of days: 186       Wound 04/01/24 Leg Right;Lower #1 (Active)   Wound Image   05/29/24 1046   Wound Etiology Traumatic 04/01/24 1002   Dressing Status New dressing applied 06/12/24 1059   Wound Cleansed Cleansed with saline 06/12/24 1059   Dressing/Treatment

## 2024-07-18 ENCOUNTER — TELEPHONE (OUTPATIENT)
Dept: VASCULAR SURGERY | Age: 69
End: 2024-07-18

## 2024-07-18 NOTE — TELEPHONE ENCOUNTER
Patient cancelled appointment today with Dr. Hernandez as she is sick.  Was recently seen at [facility].  Asked her to call once ulcer is healed for follow up.  
oriented to person, place, time and situation

## 2024-07-19 ENCOUNTER — HOSPITAL ENCOUNTER (OUTPATIENT)
Dept: ULTRASOUND IMAGING | Age: 69
End: 2024-07-19
Attending: PODIATRIST
Payer: MEDICARE

## 2024-07-19 ENCOUNTER — HOSPITAL ENCOUNTER (OUTPATIENT)
Dept: CT IMAGING | Age: 69
End: 2024-07-19
Attending: PODIATRIST
Payer: MEDICARE

## 2024-07-19 DIAGNOSIS — G51.8 PAIN DUE TO NEUROPATHY OF FACIAL NERVE: ICD-10-CM

## 2024-07-19 DIAGNOSIS — I70.232 ATHEROSCLEROSIS OF NATIVE ARTERY OF RIGHT LOWER EXTREMITY WITH ULCERATION OF CALF (HCC): ICD-10-CM

## 2024-07-19 DIAGNOSIS — L97.211 LOWER LIMB ULCER, CALF, RIGHT, LIMITED TO BREAKDOWN OF SKIN (HCC): ICD-10-CM

## 2024-07-19 DIAGNOSIS — L97.212 LOWER LIMB ULCER, CALF, RIGHT, WITH FAT LAYER EXPOSED (HCC): ICD-10-CM

## 2024-07-19 PROCEDURE — 93971 EXTREMITY STUDY: CPT

## 2024-07-19 PROCEDURE — 70486 CT MAXILLOFACIAL W/O DYE: CPT

## 2024-07-19 RX ORDER — CETIRIZINE HYDROCHLORIDE 10 MG/1
TABLET ORAL
Qty: 90 TABLET | Refills: 5 | Status: SHIPPED | OUTPATIENT
Start: 2024-07-19

## 2024-07-23 PROBLEM — N39.0 UTI (URINARY TRACT INFECTION): Status: RESOLVED | Noted: 2024-06-22 | Resolved: 2024-07-23

## 2024-07-24 ENCOUNTER — HOSPITAL ENCOUNTER (OUTPATIENT)
Dept: OTHER | Age: 69
Setting detail: THERAPIES SERIES
Discharge: HOME | End: 2024-07-24
Payer: MEDICARE

## 2024-07-24 ENCOUNTER — TELEPHONE (OUTPATIENT)
Dept: CARDIOLOGY CLINIC | Age: 69
End: 2024-07-24

## 2024-07-24 ENCOUNTER — HOSPITAL ENCOUNTER (OUTPATIENT)
Dept: WOUND CARE | Age: 69
Discharge: HOME | End: 2024-07-24
Attending: PODIATRIST
Payer: MEDICARE

## 2024-07-24 VITALS
DIASTOLIC BLOOD PRESSURE: 70 MMHG | TEMPERATURE: 97.7 F | RESPIRATION RATE: 18 BRPM | BODY MASS INDEX: 32.39 KG/M2 | SYSTOLIC BLOOD PRESSURE: 154 MMHG | HEIGHT: 62 IN | HEART RATE: 84 BPM | WEIGHT: 176 LBS

## 2024-07-24 VITALS
WEIGHT: 190 LBS | HEART RATE: 87 BPM | OXYGEN SATURATION: 93 % | SYSTOLIC BLOOD PRESSURE: 118 MMHG | BODY MASS INDEX: 34.75 KG/M2 | DIASTOLIC BLOOD PRESSURE: 80 MMHG | RESPIRATION RATE: 16 BRPM

## 2024-07-24 DIAGNOSIS — I10 ESSENTIAL HYPERTENSION: ICD-10-CM

## 2024-07-24 DIAGNOSIS — I50.42 CHRONIC COMBINED SYSTOLIC AND DIASTOLIC HEART FAILURE (HCC): Primary | ICD-10-CM

## 2024-07-24 DIAGNOSIS — L97.212 LOWER LIMB ULCER, CALF, RIGHT, WITH FAT LAYER EXPOSED (HCC): ICD-10-CM

## 2024-07-24 DIAGNOSIS — I70.232 ATHEROSCLEROSIS OF NATIVE ARTERY OF RIGHT LOWER EXTREMITY WITH ULCERATION OF CALF (HCC): Primary | ICD-10-CM

## 2024-07-24 DIAGNOSIS — L97.211 LOWER LIMB ULCER, CALF, RIGHT, LIMITED TO BREAKDOWN OF SKIN (HCC): ICD-10-CM

## 2024-07-24 LAB
ANION GAP SERPL CALCULATED.3IONS-SCNC: 9 MMOL/L (ref 7–16)
BNP SERPL-MCNC: 600 PG/ML (ref 0–125)
BUN SERPL-MCNC: 22 MG/DL (ref 6–23)
CALCIUM SERPL-MCNC: 8.3 MG/DL (ref 8.6–10.2)
CHLORIDE SERPL-SCNC: 107 MMOL/L (ref 98–107)
CO2 SERPL-SCNC: 22 MMOL/L (ref 22–29)
CREAT SERPL-MCNC: 1.8 MG/DL (ref 0.5–1)
GFR, ESTIMATED: 31 ML/MIN/1.73M2
GLUCOSE SERPL-MCNC: 141 MG/DL (ref 74–99)
POTASSIUM SERPL-SCNC: 4.5 MMOL/L (ref 3.5–5)
SODIUM SERPL-SCNC: 138 MMOL/L (ref 132–146)

## 2024-07-24 PROCEDURE — 36415 COLL VENOUS BLD VENIPUNCTURE: CPT

## 2024-07-24 PROCEDURE — 99214 OFFICE O/P EST MOD 30 MIN: CPT

## 2024-07-24 PROCEDURE — 83880 ASSAY OF NATRIURETIC PEPTIDE: CPT

## 2024-07-24 PROCEDURE — 11042 DBRDMT SUBQ TIS 1ST 20SQCM/<: CPT

## 2024-07-24 PROCEDURE — 80048 BASIC METABOLIC PNL TOTAL CA: CPT

## 2024-07-24 RX ORDER — LIDOCAINE HYDROCHLORIDE 40 MG/ML
SOLUTION TOPICAL ONCE
OUTPATIENT
Start: 2024-07-24 | End: 2024-07-24

## 2024-07-24 RX ORDER — GENTAMICIN SULFATE 1 MG/G
OINTMENT TOPICAL ONCE
OUTPATIENT
Start: 2024-07-24 | End: 2024-07-24

## 2024-07-24 RX ORDER — BETAMETHASONE DIPROPIONATE 0.5 MG/G
CREAM TOPICAL ONCE
OUTPATIENT
Start: 2024-07-24 | End: 2024-07-24

## 2024-07-24 RX ORDER — CLOBETASOL PROPIONATE 0.5 MG/G
OINTMENT TOPICAL ONCE
OUTPATIENT
Start: 2024-07-24 | End: 2024-07-24

## 2024-07-24 RX ORDER — IBUPROFEN 200 MG
TABLET ORAL ONCE
OUTPATIENT
Start: 2024-07-24 | End: 2024-07-24

## 2024-07-24 RX ORDER — BACITRACIN ZINC AND POLYMYXIN B SULFATE 500; 1000 [USP'U]/G; [USP'U]/G
OINTMENT TOPICAL ONCE
OUTPATIENT
Start: 2024-07-24 | End: 2024-07-24

## 2024-07-24 RX ORDER — LIDOCAINE HYDROCHLORIDE 40 MG/ML
SOLUTION TOPICAL ONCE
Status: COMPLETED | OUTPATIENT
Start: 2024-07-24 | End: 2024-07-24

## 2024-07-24 RX ORDER — LIDOCAINE HYDROCHLORIDE 20 MG/ML
JELLY TOPICAL ONCE
OUTPATIENT
Start: 2024-07-24 | End: 2024-07-24

## 2024-07-24 RX ORDER — SODIUM CHLOR/HYPOCHLOROUS ACID 0.033 %
SOLUTION, IRRIGATION IRRIGATION ONCE
OUTPATIENT
Start: 2024-07-24 | End: 2024-07-24

## 2024-07-24 RX ORDER — TRIAMCINOLONE ACETONIDE 1 MG/G
OINTMENT TOPICAL ONCE
OUTPATIENT
Start: 2024-07-24 | End: 2024-07-24

## 2024-07-24 RX ORDER — LIDOCAINE 40 MG/G
CREAM TOPICAL ONCE
OUTPATIENT
Start: 2024-07-24 | End: 2024-07-24

## 2024-07-24 RX ORDER — LIDOCAINE 50 MG/G
OINTMENT TOPICAL ONCE
OUTPATIENT
Start: 2024-07-24 | End: 2024-07-24

## 2024-07-24 RX ORDER — GINSENG 100 MG
CAPSULE ORAL ONCE
OUTPATIENT
Start: 2024-07-24 | End: 2024-07-24

## 2024-07-24 RX ADMIN — LIDOCAINE HYDROCHLORIDE 5 ML: 40 SOLUTION TOPICAL at 10:14

## 2024-07-24 NOTE — TELEPHONE ENCOUNTER
----- Message from Gbaino Boateng MD sent at 7/24/2024  1:59 PM EDT -----  Her renal functions are gone up? Please ask her increased fluid intake to 64OZ  per day/  Please ask her whether she is taking any diuretics at home, currently no diuretics were listed on her med list.  She needs another BMP on Monday.

## 2024-07-24 NOTE — PROGRESS NOTES
Congestive Heart Failure Clinic   [facility]      Referring Provider: Dr Ledbetter  Primary Care Physician: Lita Swain APRN - NP   Cardiologist: Dr Boateng  Nephrologist: Dr Hunter       HISTORY OF PRESENT ILLNESS:     Steph Gonzáles is a 68 y.o. (1955) female with a history of HFpEF (EF> 50%)  Pre Cupid:     Lab Results   Component Value Date    LVEF 40 04/16/2022     Post Cupid:    Lab Results   Component Value Date    EFBP 56 05/09/2024         She presents to the CHF clinic for ongoing evaluation and monitoring of heart failure.    In the CHF clinic today she denies any adverse symptoms except:  [] Dizziness or lightheadedness   [] Syncope or near syncope  [] Recent Fall  [] Shortness of breath at rest   [] Dyspnea with exertion  [] Decline in functional capacity (unable to perform activities they had previously been able to do)  [] Fatigue   [] Orthopnea  [] PND  [] Nocturnal cough  [] Hemoptysis  [] Chest pain, pressure, or discomfort  [] Palpitations  [] Abdominal distention  [] Abdominal bloating  [] Early satiety  [] Blood in stool   [] Diarrhea  [] Constipation  [] Nausea/Vomiting  [] Decreased urinary response to oral diuretic   [] Scrotal swelling   [] Lower extremity edema  [] Used PRN doses of oral diuretic   [] Weight gain    Wt Readings from Last 3 Encounters:   07/24/24 86.2 kg (190 lb)   07/24/24 79.8 kg (176 lb)   07/17/24 79.8 kg (176 lb)           SOCIAL HISTORY:  [x] Denies tobacco, alcohol or illicit drug abuse  [] Tobacco use:  [] ETOH use:  [] Illicit drug use:        MEDICATIONS:    Allergies   Allergen Reactions    Latex Hives    Bee Venom Anaphylaxis    Dilaudid [Hydromorphone Hcl] Itching    Dye [Iodides] Hives and Shortness Of Breath    Keflex [Cephalexin] Itching and Rash    Bee Pollen     Cetirizine & Related Hives    Fentanyl Itching    Lasix [Furosemide] Other (See Comments)     Pt states she cramps up and gets

## 2024-07-24 NOTE — DISCHARGE INSTRUCTIONS
Visit Discharge/Physician Orders     Discharge condition: Stable     Assessment of pain at discharge: yes     Anesthetic used:  lidocaine 4%      Discharge to: Home     Left via:Private automobile     Accompanied by: accompanied by none     ECF[facility]HHA: [facility]     Dressing Orders:Cleanse wound to right leg with saline apply alginate ag, unna boot (fan and wrap from base of the toes to 2 fingers below the knee)     Keep wrap dry at all times. If wrap becomes wet, becomes painful or falls 2 inches- may remove wrap- do daily dressing changes and apply jammie and coban     Treatment Orders:US of right leg   EAT DIET WITH PROTEINS AND VITAMIN C  TAKE A MULTIVITAMIN DAILY IF NOT CONTRAINDICATED          [facility] followup visit _____1 week ________  (Please note your next appointment above and if you are unable to keep, kindly give a 24 hour notice. Thank you.)     Physician signature:__________________________        If you experience any of the following, please call the Wound Care Center during business hours:     * Increase in Pain  * Temperature over 101  * Increase in drainage from your wound  * Drainage with a foul odor  * Bleeding  * Increase in swelling  * Need for compression bandage changes due to slippage, breakthrough drainage.     If you need medical attention outside of the business hours of the Wound Care Centers please contact your PCP or go to the nearest emergency room.

## 2024-07-24 NOTE — PLAN OF CARE
Problem: Chronic Conditions and Co-morbidities  Goal: Patient's chronic conditions and co-morbidity symptoms are monitored and maintained or improved  Outcome: Progressing     Problem: Cognitive:  Goal: Knowledge of wound care  Description: Knowledge of wound care  Outcome: Progressing  Goal: Understands risk factors for wounds  Description: Understands risk factors for wounds  Outcome: Progressing     Problem: Pain  Goal: Verbalizes/displays adequate comfort level or baseline comfort level  Outcome: Progressing     Problem: Wound:  Goal: Will show signs of wound healing; wound closure and no evidence of infection  Description: Will show signs of wound healing; wound closure and no evidence of infection  Outcome: Progressing

## 2024-07-24 NOTE — PROGRESS NOTES
Wound Healing Center Followup Visit Note    Referring Physician : Lita Swain APRN - NP  Steph Gonzáles  MEDICAL RECORD NUMBER:  [id number]  AGE: 68 y.o.   GENDER: female  : 1955  EPISODE DATE:  2024    Subjective:     Chief Complaint   Patient presents with    Wound Check     RIGHT LEG      HISTORY of PRESENT ILLNESS HPI   Steph Gonzáles is a 68 y.o. female who presents today in regards to follow up evaluation and treatment of wound/ulcer.  That patient's past medical, family and social hx were reviewed and changes were made if present.          History of Wound Context:  The patient has had a wound of right calf just above the ankle which was first noted approximately 2024, tells me, the leg was injured, while moving on the gurney while she was hospitalized at [facility].  This has been treated by the patient.  On their initial visit to the wound healing center, 24, the patient has noted that the wound has not been improving.  The patient has not had similar previous wounds in the past.          Patient, when I saw her in the office last week, did not want to come to the wound care center the [facility], wanted to go to the [facility] due to some confusion, patient ended up coming to the wound care center at the [facility]     Patient denies any history of fever or chills, any chest pain or shortness of breath     Pt is currently not on abx.      2024  The right leg ulcer improved slightly, skin, eschar, some subcu and fat layer      Discussed with the patient regarding the lower extremity arterial Doppler study, informed her that she does have arterial occlusive disease but there is a presently adequate collateral flow for tissue healing, for now will follow conservatively with local wound care, if no improvement may require angiography      The lower extremity arterial Doppler study was personally reviewed by me as outlined below

## 2024-07-24 NOTE — TELEPHONE ENCOUNTER
Patient notified of lab results.  She states Dr. Hunter restarted Bumex 1 mg daily on 7/18/24. She has been urinating a lot and has complaints of low back pain.

## 2024-07-25 NOTE — TELEPHONE ENCOUNTER
Ask her to told entresto until Monday, hold bumex today and tomorrow, monitor BP and HR and call us on Monday.

## 2024-07-26 RX ORDER — SIMVASTATIN 20 MG
20 TABLET ORAL NIGHTLY
Qty: 90 TABLET | Refills: 3 | Status: SHIPPED | OUTPATIENT
Start: 2024-07-26

## 2024-07-27 PROCEDURE — 93298 REM INTERROG DEV EVAL SCRMS: CPT | Performed by: INTERNAL MEDICINE

## 2024-07-29 ENCOUNTER — HOSPITAL ENCOUNTER (OUTPATIENT)
Age: 69
Discharge: HOME | End: 2024-07-29
Payer: MEDICARE

## 2024-07-29 DIAGNOSIS — I10 ESSENTIAL HYPERTENSION: ICD-10-CM

## 2024-07-29 DIAGNOSIS — I50.42 CHRONIC COMBINED SYSTOLIC AND DIASTOLIC HEART FAILURE (HCC): ICD-10-CM

## 2024-07-29 LAB
ANION GAP SERPL CALCULATED.3IONS-SCNC: 10 MMOL/L (ref 7–16)
BUN SERPL-MCNC: 35 MG/DL (ref 6–23)
CALCIUM SERPL-MCNC: 8.7 MG/DL (ref 8.6–10.2)
CHLORIDE SERPL-SCNC: 104 MMOL/L (ref 98–107)
CO2 SERPL-SCNC: 23 MMOL/L (ref 22–29)
CREAT SERPL-MCNC: 1.7 MG/DL (ref 0.5–1)
GFR, ESTIMATED: 33 ML/MIN/1.73M2
GLUCOSE SERPL-MCNC: 78 MG/DL (ref 74–99)
POTASSIUM SERPL-SCNC: 4.3 MMOL/L (ref 3.5–5)
SODIUM SERPL-SCNC: 137 MMOL/L (ref 132–146)

## 2024-07-29 PROCEDURE — 80048 BASIC METABOLIC PNL TOTAL CA: CPT

## 2024-07-29 PROCEDURE — 36415 COLL VENOUS BLD VENIPUNCTURE: CPT

## 2024-07-30 ENCOUNTER — TELEPHONE (OUTPATIENT)
Dept: CARDIOLOGY CLINIC | Age: 69
End: 2024-07-30

## 2024-07-30 DIAGNOSIS — I10 ESSENTIAL HYPERTENSION: Primary | ICD-10-CM

## 2024-07-30 DIAGNOSIS — I50.42 CHRONIC COMBINED SYSTOLIC AND DIASTOLIC HEART FAILURE (HCC): ICD-10-CM

## 2024-07-30 NOTE — TELEPHONE ENCOUNTER
Patient notified of lab results and Dr. Boateng's recommendations.    I also obtained her BP/P readings.  She has been holding her Bumex and Entresto since Thursday 7/25/24 due to low BPs.    132/65 (95)  145/64 (92)  135/76 (95)

## 2024-07-30 NOTE — TELEPHONE ENCOUNTER
----- Message from Gabino Boateng MD sent at 7/30/2024 12:45 PM EDT -----  Renal functions are gradually improving.  Please ask the patient to stay hydrated and get another BMP in 1 week.

## 2024-07-31 ENCOUNTER — HOSPITAL ENCOUNTER (OUTPATIENT)
Dept: WOUND CARE | Age: 69
Discharge: HOME | End: 2024-07-31
Attending: PODIATRIST
Payer: MEDICARE

## 2024-07-31 VITALS
HEART RATE: 82 BPM | DIASTOLIC BLOOD PRESSURE: 80 MMHG | SYSTOLIC BLOOD PRESSURE: 152 MMHG | TEMPERATURE: 96.6 F | RESPIRATION RATE: 18 BRPM

## 2024-07-31 DIAGNOSIS — I70.232 ATHEROSCLEROSIS OF NATIVE ARTERY OF RIGHT LOWER EXTREMITY WITH ULCERATION OF CALF (HCC): Primary | ICD-10-CM

## 2024-07-31 DIAGNOSIS — L97.211 LOWER LIMB ULCER, CALF, RIGHT, LIMITED TO BREAKDOWN OF SKIN (HCC): ICD-10-CM

## 2024-07-31 DIAGNOSIS — L97.212 LOWER LIMB ULCER, CALF, RIGHT, WITH FAT LAYER EXPOSED (HCC): ICD-10-CM

## 2024-07-31 PROCEDURE — 99212 OFFICE O/P EST SF 10 MIN: CPT

## 2024-07-31 RX ORDER — LIDOCAINE 40 MG/G
CREAM TOPICAL ONCE
Status: CANCELLED | OUTPATIENT
Start: 2024-07-31 | End: 2024-07-31

## 2024-07-31 RX ORDER — PREDNISONE 10 MG/1
10 TABLET ORAL DAILY
COMMUNITY

## 2024-07-31 RX ORDER — BETAMETHASONE DIPROPIONATE 0.5 MG/G
CREAM TOPICAL ONCE
Status: CANCELLED | OUTPATIENT
Start: 2024-07-31 | End: 2024-07-31

## 2024-07-31 RX ORDER — LIDOCAINE HYDROCHLORIDE 20 MG/ML
JELLY TOPICAL ONCE
Status: CANCELLED | OUTPATIENT
Start: 2024-07-31 | End: 2024-07-31

## 2024-07-31 RX ORDER — LIDOCAINE 50 MG/G
OINTMENT TOPICAL ONCE
Status: CANCELLED | OUTPATIENT
Start: 2024-07-31 | End: 2024-07-31

## 2024-07-31 RX ORDER — LIDOCAINE HYDROCHLORIDE 40 MG/ML
SOLUTION TOPICAL ONCE
Status: CANCELLED | OUTPATIENT
Start: 2024-07-31 | End: 2024-07-31

## 2024-07-31 RX ORDER — TRIAMCINOLONE ACETONIDE 1 MG/G
OINTMENT TOPICAL ONCE
Status: CANCELLED | OUTPATIENT
Start: 2024-07-31 | End: 2024-07-31

## 2024-07-31 RX ORDER — CLOBETASOL PROPIONATE 0.5 MG/G
OINTMENT TOPICAL ONCE
Status: CANCELLED | OUTPATIENT
Start: 2024-07-31 | End: 2024-07-31

## 2024-07-31 RX ORDER — SODIUM CHLOR/HYPOCHLOROUS ACID 0.033 %
SOLUTION, IRRIGATION IRRIGATION ONCE
Status: CANCELLED | OUTPATIENT
Start: 2024-07-31 | End: 2024-07-31

## 2024-07-31 RX ORDER — BACITRACIN ZINC AND POLYMYXIN B SULFATE 500; 1000 [USP'U]/G; [USP'U]/G
OINTMENT TOPICAL ONCE
Status: CANCELLED | OUTPATIENT
Start: 2024-07-31 | End: 2024-07-31

## 2024-07-31 RX ORDER — IBUPROFEN 200 MG
TABLET ORAL ONCE
Status: CANCELLED | OUTPATIENT
Start: 2024-07-31 | End: 2024-07-31

## 2024-07-31 RX ORDER — GINSENG 100 MG
CAPSULE ORAL ONCE
Status: CANCELLED | OUTPATIENT
Start: 2024-07-31 | End: 2024-07-31

## 2024-07-31 RX ORDER — GENTAMICIN SULFATE 1 MG/G
OINTMENT TOPICAL ONCE
Status: CANCELLED | OUTPATIENT
Start: 2024-07-31 | End: 2024-07-31

## 2024-07-31 ASSESSMENT — PAIN DESCRIPTION - LOCATION: LOCATION: LEG

## 2024-07-31 ASSESSMENT — PAIN DESCRIPTION - ORIENTATION: ORIENTATION: RIGHT

## 2024-07-31 ASSESSMENT — PAIN - FUNCTIONAL ASSESSMENT: PAIN_FUNCTIONAL_ASSESSMENT: ACTIVITIES ARE NOT PREVENTED

## 2024-07-31 ASSESSMENT — PAIN SCALES - GENERAL: PAINLEVEL_OUTOF10: 8

## 2024-07-31 ASSESSMENT — PAIN DESCRIPTION - DESCRIPTORS: DESCRIPTORS: SHARP

## 2024-07-31 ASSESSMENT — PAIN DESCRIPTION - ONSET: ONSET: SUDDEN

## 2024-07-31 ASSESSMENT — PAIN DESCRIPTION - FREQUENCY: FREQUENCY: INTERMITTENT

## 2024-07-31 NOTE — PROGRESS NOTES
Cleansed with saline 07/17/24 1021   Dressing/Treatment ABD;Alginate with Ag;Coban/self-adherent bandages 07/17/24 1021   Wound Length (cm) 0 cm 07/31/24 1001   Wound Width (cm) 0 cm 07/31/24 1001   Wound Depth (cm) 0 cm 07/31/24 1001   Wound Surface Area (cm^2) 0 cm^2 07/31/24 1001   Change in Wound Size % (l*w) 100 07/31/24 1001   Wound Volume (cm^3) 0 cm^3 07/31/24 1001   Wound Healing % 100 07/31/24 1001   Post-Procedure Length (cm) 0 cm 07/31/24 1034   Post-Procedure Width (cm) 0 cm 07/31/24 1034   Post-Procedure Depth (cm) 0 cm 07/31/24 1034   Post-Procedure Surface Area (cm^2) 0 cm^2 07/31/24 1034   Post-Procedure Volume (cm^3) 0 cm^3 07/31/24 1034   Wound Assessment Epithelialization 07/31/24 1001   Drainage Amount None (dry) 07/31/24 1001   Drainage Description Yellow 07/24/24 1007   Odor None 07/31/24 1001   Kely-wound Assessment Intact 07/31/24 1001   Margins Attached edges 07/03/24 0933   Wound Thickness Description not for Pressure Injury Full thickness 07/03/24 0933   Number of days: 121       Wound 05/10/24 Sternum Left (Active)   Number of days: 81        Healed!!  Response to treatment:  Well tolerated by patient.     Plan:   Treatment Note please see attached Discharge Instructions    Written patient dismissal instructions given to patient and signed by patient or POA.         Discharge Instructions         Visit Discharge/Physician Orders     Discharge condition: Stable     Assessment of pain at discharge: yes     Anesthetic used:       Discharge to: Home     Left via:Private automobile     Accompanied by: accompanied by none     ECF/HHA: [facility]     Dressing Orders: benadryl cream to right leg, healed      Treatment Orders:US of right leg   EAT DIET WITH PROTEINS AND VITAMIN C  TAKE A MULTIVITAMIN DAILY IF NOT CONTRAINDICATED          [facility] followup visit _____healed, as needed _______  (Please note your next appointment above and if you are unable to keep, kindly give a 24 hour notice.

## 2024-07-31 NOTE — TELEPHONE ENCOUNTER
Per verbal from Dr. Boateng, okay to take Entresto, continue to hold Bumex as long as patient is not having any LE edema.  Patient notified of Dr. Boateng's recommendations.  She denies any LE edema.   Med list amended.

## 2024-07-31 NOTE — TELEPHONE ENCOUNTER
Patient notified of Dr. Boateng's recommendations. Patient states she took Entresto and Bumex today.  Her BP/P today is 165/78 (90).  Order placed for BMP.

## 2024-07-31 NOTE — PLAN OF CARE
Problem: Chronic Conditions and Co-morbidities  Goal: Patient's chronic conditions and co-morbidity symptoms are monitored and maintained or improved  Outcome: Completed     Problem: Pain  Goal: Verbalizes/displays adequate comfort level or baseline comfort level  Outcome: Completed     Problem: Cognitive:  Goal: Knowledge of wound care  Description: Knowledge of wound care  Outcome: Completed  Goal: Understands risk factors for wounds  Description: Understands risk factors for wounds  Outcome: Completed     Problem: Wound:  Goal: Will show signs of wound healing; wound closure and no evidence of infection  Description: Will show signs of wound healing; wound closure and no evidence of infection  Outcome: Completed     Problem: Arterial:  Goal: Optimize blood flow for wound healing  Description: Optimize blood flow for wound healing  Outcome: Completed     Problem: Venous:  Goal: Signs of wound healing will improve  Description: Signs of wound healing will improve  Outcome: Completed     Problem: Compression therapy:  Goal: Will be free from complications associated with compression therapy  Description: Will be free from complications associated with compression therapy  Outcome: Completed

## 2024-07-31 NOTE — DISCHARGE INSTRUCTIONS
Visit Discharge/Physician Orders     Discharge condition: Stable     Assessment of pain at discharge: yes     Anesthetic used:       Discharge to: Home     Left via:Private automobile     Accompanied by: accompanied by none     [facility]     Dressing Orders: benadryl cream to right leg, healed      Treatment Orders:US of right leg   EAT DIET WITH PROTEINS AND VITAMIN C  TAKE A MULTIVITAMIN DAILY IF NOT CONTRAINDICATED          [facility] followup visit _____healed, as needed _______  (Please note your next appointment above and if you are unable to keep, kindly give a 24 hour notice. Thank you.)     Physician signature:__________________________        If you experience any of the following, please call the Wound Care Center during business hours:     * Increase in Pain  * Temperature over 101  * Increase in drainage from your wound  * Drainage with a foul odor  * Bleeding  * Increase in swelling  * Need for compression bandage changes due to slippage, breakthrough drainage.     If you need medical attention outside of the business hours of the Wound Care Centers please contact your PCP or go to the nearest emergency room.

## 2024-08-05 ENCOUNTER — TELEPHONE (OUTPATIENT)
Dept: CARDIOLOGY CLINIC | Age: 69
End: 2024-08-05

## 2024-08-05 ENCOUNTER — HOSPITAL ENCOUNTER (OUTPATIENT)
Age: 69
Discharge: HOME | End: 2024-08-05
Payer: MEDICARE

## 2024-08-05 DIAGNOSIS — I50.42 CHRONIC COMBINED SYSTOLIC AND DIASTOLIC HEART FAILURE (HCC): Primary | ICD-10-CM

## 2024-08-05 DIAGNOSIS — N28.9 RENAL INSUFFICIENCY: ICD-10-CM

## 2024-08-05 DIAGNOSIS — I10 ESSENTIAL HYPERTENSION: ICD-10-CM

## 2024-08-05 DIAGNOSIS — I50.42 CHRONIC COMBINED SYSTOLIC AND DIASTOLIC HEART FAILURE (HCC): ICD-10-CM

## 2024-08-05 LAB
ANION GAP SERPL CALCULATED.3IONS-SCNC: 9 MMOL/L (ref 7–16)
BUN SERPL-MCNC: 27 MG/DL (ref 6–23)
CALCIUM SERPL-MCNC: 8.5 MG/DL (ref 8.6–10.2)
CHLORIDE SERPL-SCNC: 108 MMOL/L (ref 98–107)
CO2 SERPL-SCNC: 24 MMOL/L (ref 22–29)
CREAT SERPL-MCNC: 1.6 MG/DL (ref 0.5–1)
GFR, ESTIMATED: 36 ML/MIN/1.73M2
GLUCOSE SERPL-MCNC: 116 MG/DL (ref 74–99)
POTASSIUM SERPL-SCNC: 4.8 MMOL/L (ref 3.5–5)
SODIUM SERPL-SCNC: 141 MMOL/L (ref 132–146)

## 2024-08-05 PROCEDURE — 80048 BASIC METABOLIC PNL TOTAL CA: CPT

## 2024-08-05 PROCEDURE — 36415 COLL VENOUS BLD VENIPUNCTURE: CPT

## 2024-08-05 NOTE — TELEPHONE ENCOUNTER
----- Message from Gabino Boateng MD sent at 8/5/2024  3:17 PM EDT -----  Renal functions are improving, stay off Bumex she can restart Entresto and recheck BMP in 1 week.

## 2024-08-06 NOTE — TELEPHONE ENCOUNTER
Patient notified of lab results and Dr. Boateng's recommendations.  Order placed for BMP.     Abdomen soft, non-tender, no guarding.

## 2024-08-12 ENCOUNTER — HOSPITAL ENCOUNTER (OUTPATIENT)
Age: 69
Discharge: HOME | End: 2024-08-12
Payer: MEDICARE

## 2024-08-12 ENCOUNTER — TELEPHONE (OUTPATIENT)
Dept: CARDIOLOGY CLINIC | Age: 69
End: 2024-08-12

## 2024-08-12 DIAGNOSIS — N28.9 RENAL INSUFFICIENCY: ICD-10-CM

## 2024-08-12 DIAGNOSIS — I10 ESSENTIAL HYPERTENSION: ICD-10-CM

## 2024-08-12 DIAGNOSIS — I50.42 CHRONIC COMBINED SYSTOLIC AND DIASTOLIC HEART FAILURE (HCC): ICD-10-CM

## 2024-08-12 DIAGNOSIS — I50.42 CHRONIC COMBINED SYSTOLIC AND DIASTOLIC HEART FAILURE (HCC): Primary | ICD-10-CM

## 2024-08-12 LAB
ANION GAP SERPL CALCULATED.3IONS-SCNC: 7 MMOL/L (ref 7–16)
BUN SERPL-MCNC: 27 MG/DL (ref 6–23)
CALCIUM SERPL-MCNC: 8.5 MG/DL (ref 8.6–10.2)
CHLORIDE SERPL-SCNC: 106 MMOL/L (ref 98–107)
CO2 SERPL-SCNC: 25 MMOL/L (ref 22–29)
CREAT SERPL-MCNC: 1.7 MG/DL (ref 0.5–1)
GFR, ESTIMATED: 33 ML/MIN/1.73M2
GLUCOSE SERPL-MCNC: 105 MG/DL (ref 74–99)
POTASSIUM SERPL-SCNC: 4.8 MMOL/L (ref 3.5–5)
SODIUM SERPL-SCNC: 138 MMOL/L (ref 132–146)

## 2024-08-12 PROCEDURE — 80048 BASIC METABOLIC PNL TOTAL CA: CPT

## 2024-08-12 PROCEDURE — 36415 COLL VENOUS BLD VENIPUNCTURE: CPT

## 2024-08-12 RX ORDER — SACUBITRIL AND VALSARTAN 49; 51 MG/1; MG/1
1 TABLET, FILM COATED ORAL 2 TIMES DAILY
COMMUNITY
End: 2024-08-12 | Stop reason: SDUPTHER

## 2024-08-12 NOTE — TELEPHONE ENCOUNTER
Patient notified of lab results and Dr. Boateng's recommendations.  Med list amended.  Entresto e-scribed.  Order placed for BMP.

## 2024-08-12 NOTE — TELEPHONE ENCOUNTER
----- Message from Dr. Gabino Boateng MD sent at 8/12/2024  2:10 PM EDT -----  Her renal functions are going up again.  I would recommend holding Entresto x 3 days and decreasing Entresto to 49/51 mg p.o. twice daily and recheck BMP in 1 week.  Please asked the patient stay well-hydrated and continue to hold diuretics.

## 2024-08-13 ENCOUNTER — TELEPHONE (OUTPATIENT)
Dept: ENT CLINIC | Age: 69
End: 2024-08-13

## 2024-08-13 ENCOUNTER — TELEPHONE (OUTPATIENT)
Dept: CARDIOLOGY CLINIC | Age: 69
End: 2024-08-13

## 2024-08-13 RX ORDER — SACUBITRIL AND VALSARTAN 49; 51 MG/1; MG/1
1 TABLET, FILM COATED ORAL 2 TIMES DAILY
Qty: 60 TABLET | Refills: 11 | Status: SHIPPED | OUTPATIENT
Start: 2024-08-13

## 2024-08-13 NOTE — TELEPHONE ENCOUNTER
Pt called pineda. She wants to know what Dr. Diaz is going to do about her mouth. She said she has lost so much weight over the last year without her teeth. Pt does not want to see him for an appointment but wants some answers.  Please advise.     Electronically signed by Wilma Leyva on 8/13/2024 at 3:15 PM

## 2024-08-13 NOTE — TELEPHONE ENCOUNTER
Received a call from [facility] (patient's PCP) asking for records with documentation advising that Dr. Boateng recommended referral to endocrinologist.  I do not see this documentation.  Please advise.     Fax: [phone]

## 2024-08-14 NOTE — TELEPHONE ENCOUNTER
Called patient in regards to current symptoms patient states losing weight, teeth and gums in pain. Patient states gums are in pain and would like to have implants vs dentures advised seeing the dentist and send notes to the provider.

## 2024-08-14 NOTE — TELEPHONE ENCOUNTER
I did not recommend to see an endocrinologist.  It may be her primary care provider who might have recommended.

## 2024-08-19 ENCOUNTER — HOSPITAL ENCOUNTER (OUTPATIENT)
Age: 69
Discharge: HOME | End: 2024-08-19
Payer: MEDICARE

## 2024-08-19 DIAGNOSIS — N28.9 RENAL INSUFFICIENCY: ICD-10-CM

## 2024-08-19 DIAGNOSIS — I50.42 CHRONIC COMBINED SYSTOLIC AND DIASTOLIC HEART FAILURE (HCC): ICD-10-CM

## 2024-08-19 LAB
ANION GAP SERPL CALCULATED.3IONS-SCNC: 7 MMOL/L (ref 7–16)
BUN SERPL-MCNC: 36 MG/DL (ref 6–23)
CALCIUM SERPL-MCNC: 8.6 MG/DL (ref 8.6–10.2)
CHLORIDE SERPL-SCNC: 107 MMOL/L (ref 98–107)
CO2 SERPL-SCNC: 23 MMOL/L (ref 22–29)
CREAT SERPL-MCNC: 1.7 MG/DL (ref 0.5–1)
GFR, ESTIMATED: 33 ML/MIN/1.73M2
GLUCOSE SERPL-MCNC: 104 MG/DL (ref 74–99)
POTASSIUM SERPL-SCNC: 4.6 MMOL/L (ref 3.5–5)
SODIUM SERPL-SCNC: 137 MMOL/L (ref 132–146)

## 2024-08-19 PROCEDURE — 36415 COLL VENOUS BLD VENIPUNCTURE: CPT

## 2024-08-19 PROCEDURE — 80048 BASIC METABOLIC PNL TOTAL CA: CPT

## 2024-08-23 ENCOUNTER — TELEPHONE (OUTPATIENT)
Dept: CARDIOLOGY CLINIC | Age: 69
End: 2024-08-23

## 2024-08-23 DIAGNOSIS — I50.42 CHRONIC COMBINED SYSTOLIC AND DIASTOLIC HEART FAILURE (HCC): Primary | ICD-10-CM

## 2024-08-23 DIAGNOSIS — N28.9 RENAL INSUFFICIENCY: ICD-10-CM

## 2024-08-23 NOTE — TELEPHONE ENCOUNTER
----- Message from Dr. Gabino Boateng MD sent at 8/23/2024  7:50 AM EDT -----  Renal functions are still high, please ask her to increase fluids/water to 48-64 oz per day and check BMP on Monday.

## 2024-08-23 NOTE — TELEPHONE ENCOUNTER
Called patient no answer LVM on pt cell phone to return office call if needed. PCP do not make hospital calls to patient.  Patient will be treated by physicians at hospital, and when discharged, patient should call and make f/u visit with PCP Reference ECG taken

## 2024-08-26 ENCOUNTER — TELEPHONE (OUTPATIENT)
Dept: CARDIOLOGY CLINIC | Age: 69
End: 2024-08-26

## 2024-08-26 ENCOUNTER — HOSPITAL ENCOUNTER (OUTPATIENT)
Age: 69
Discharge: HOME | End: 2024-08-26
Payer: MEDICARE

## 2024-08-26 DIAGNOSIS — N28.9 RENAL INSUFFICIENCY: ICD-10-CM

## 2024-08-26 DIAGNOSIS — I50.42 CHRONIC COMBINED SYSTOLIC AND DIASTOLIC HEART FAILURE (HCC): ICD-10-CM

## 2024-08-26 LAB
ANION GAP SERPL CALCULATED.3IONS-SCNC: 9 MMOL/L (ref 7–16)
BUN SERPL-MCNC: 38 MG/DL (ref 6–23)
CALCIUM SERPL-MCNC: 8.4 MG/DL (ref 8.6–10.2)
CHLORIDE SERPL-SCNC: 106 MMOL/L (ref 98–107)
CO2 SERPL-SCNC: 23 MMOL/L (ref 22–29)
CREAT SERPL-MCNC: 1.6 MG/DL (ref 0.5–1)
GFR, ESTIMATED: 35 ML/MIN/1.73M2
GLUCOSE SERPL-MCNC: 100 MG/DL (ref 74–99)
POTASSIUM SERPL-SCNC: 4.9 MMOL/L (ref 3.5–5)
SODIUM SERPL-SCNC: 138 MMOL/L (ref 132–146)

## 2024-08-26 PROCEDURE — 36415 COLL VENOUS BLD VENIPUNCTURE: CPT

## 2024-08-26 PROCEDURE — 80048 BASIC METABOLIC PNL TOTAL CA: CPT

## 2024-08-26 RX ORDER — BUMETANIDE 1 MG/1
1 TABLET ORAL DAILY PRN
COMMUNITY

## 2024-08-26 NOTE — TELEPHONE ENCOUNTER
----- Message from Dr. Gabino Boateng MD sent at 8/26/2024 10:33 AM EDT -----  Blood work showed slightly better renal functions, continue current meds and stay well hydrated.

## 2024-08-26 NOTE — TELEPHONE ENCOUNTER
Per verbal from Dr. Boateng pt can restart Bumex prn daily for leg swelling. Pt notified of Lab results and recommendations. Med list amended

## 2024-08-27 PROCEDURE — 93298 REM INTERROG DEV EVAL SCRMS: CPT | Performed by: INTERNAL MEDICINE

## 2024-09-04 ENCOUNTER — TELEPHONE (OUTPATIENT)
Dept: VASCULAR SURGERY | Age: 69
End: 2024-09-04

## 2024-09-04 NOTE — TELEPHONE ENCOUNTER
Pt cancelled her upcoming appt with Dr. Hernandez as she recently tested positive for COVID.  She will call to reschedule.

## 2024-09-13 ENCOUNTER — TELEPHONE (OUTPATIENT)
Dept: NON INVASIVE DIAGNOSTICS | Age: 69
End: 2024-09-13

## 2024-09-16 ENCOUNTER — OFFICE VISIT (OUTPATIENT)
Dept: PRIMARY CARE CLINIC | Age: 69
End: 2024-09-16
Payer: MEDICARE

## 2024-09-16 VITALS
BODY MASS INDEX: 34.67 KG/M2 | DIASTOLIC BLOOD PRESSURE: 70 MMHG | HEART RATE: 74 BPM | WEIGHT: 188.4 LBS | TEMPERATURE: 97.8 F | HEIGHT: 62 IN | RESPIRATION RATE: 18 BRPM | SYSTOLIC BLOOD PRESSURE: 151 MMHG | OXYGEN SATURATION: 95 %

## 2024-09-16 DIAGNOSIS — J22 ACUTE LOWER RESPIRATORY TRACT INFECTION: Primary | ICD-10-CM

## 2024-09-16 DIAGNOSIS — R05.8 COUGH PRODUCTIVE OF YELLOW SPUTUM: ICD-10-CM

## 2024-09-16 LAB
INFLUENZA A ANTIGEN, POC: NEGATIVE
INFLUENZA B ANTIGEN, POC: NEGATIVE
LOT EXPIRE DATE: NORMAL
LOT KIT NUMBER: NORMAL
SARS-COV-2, POC: NORMAL
VALID INTERNAL CONTROL: NORMAL
VENDOR AND KIT NAME POC: NORMAL

## 2024-09-16 PROCEDURE — 3017F COLORECTAL CA SCREEN DOC REV: CPT | Performed by: NURSE PRACTITIONER

## 2024-09-16 PROCEDURE — G8427 DOCREV CUR MEDS BY ELIG CLIN: HCPCS | Performed by: NURSE PRACTITIONER

## 2024-09-16 PROCEDURE — G8417 CALC BMI ABV UP PARAM F/U: HCPCS | Performed by: NURSE PRACTITIONER

## 2024-09-16 PROCEDURE — 99213 OFFICE O/P EST LOW 20 MIN: CPT | Performed by: NURSE PRACTITIONER

## 2024-09-16 PROCEDURE — G8399 PT W/DXA RESULTS DOCUMENT: HCPCS | Performed by: NURSE PRACTITIONER

## 2024-09-16 PROCEDURE — 3078F DIAST BP <80 MM HG: CPT | Performed by: NURSE PRACTITIONER

## 2024-09-16 PROCEDURE — 3077F SYST BP >= 140 MM HG: CPT | Performed by: NURSE PRACTITIONER

## 2024-09-16 PROCEDURE — 1123F ACP DISCUSS/DSCN MKR DOCD: CPT | Performed by: NURSE PRACTITIONER

## 2024-09-16 PROCEDURE — 4004F PT TOBACCO SCREEN RCVD TLK: CPT | Performed by: NURSE PRACTITIONER

## 2024-09-16 PROCEDURE — 1090F PRES/ABSN URINE INCON ASSESS: CPT | Performed by: NURSE PRACTITIONER

## 2024-09-16 PROCEDURE — 87428 SARSCOV & INF VIR A&B AG IA: CPT | Performed by: NURSE PRACTITIONER

## 2024-09-16 RX ORDER — DEXTROMETHORPHAN HYDROBROMIDE AND PROMETHAZINE HYDROCHLORIDE 15; 6.25 MG/5ML; MG/5ML
5 SYRUP ORAL 4 TIMES DAILY PRN
Qty: 180 ML | Refills: 0 | Status: SHIPPED
Start: 2024-09-16 | End: 2024-09-20

## 2024-09-16 RX ORDER — DOXYCYCLINE HYCLATE 100 MG
100 TABLET ORAL 2 TIMES DAILY
Qty: 20 TABLET | Refills: 0 | Status: SHIPPED | OUTPATIENT
Start: 2024-09-16 | End: 2024-09-26

## 2024-09-19 ENCOUNTER — TELEPHONE (OUTPATIENT)
Dept: NON INVASIVE DIAGNOSTICS | Age: 69
End: 2024-09-19

## 2024-09-20 ENCOUNTER — OFFICE VISIT (OUTPATIENT)
Dept: FAMILY MEDICINE CLINIC | Age: 69
End: 2024-09-20
Payer: MEDICARE

## 2024-09-20 VITALS
HEIGHT: 62 IN | HEART RATE: 90 BPM | BODY MASS INDEX: 34.45 KG/M2 | SYSTOLIC BLOOD PRESSURE: 125 MMHG | TEMPERATURE: 97.7 F | WEIGHT: 187.2 LBS | OXYGEN SATURATION: 94 % | DIASTOLIC BLOOD PRESSURE: 62 MMHG | RESPIRATION RATE: 18 BRPM

## 2024-09-20 DIAGNOSIS — Z79.4 TYPE 2 DIABETES MELLITUS WITH HYPOGLYCEMIA WITHOUT COMA, WITH LONG-TERM CURRENT USE OF INSULIN (HCC): ICD-10-CM

## 2024-09-20 DIAGNOSIS — R06.02 SOB (SHORTNESS OF BREATH): ICD-10-CM

## 2024-09-20 DIAGNOSIS — I25.10 CORONARY ARTERY DISEASE INVOLVING NATIVE CORONARY ARTERY OF NATIVE HEART WITHOUT ANGINA PECTORIS: Chronic | ICD-10-CM

## 2024-09-20 DIAGNOSIS — J44.9 CHRONIC OBSTRUCTIVE PULMONARY DISEASE, UNSPECIFIED COPD TYPE (HCC): ICD-10-CM

## 2024-09-20 DIAGNOSIS — I50.33 ACUTE ON CHRONIC DIASTOLIC (CONGESTIVE) HEART FAILURE (HCC): Primary | ICD-10-CM

## 2024-09-20 DIAGNOSIS — E11.649 TYPE 2 DIABETES MELLITUS WITH HYPOGLYCEMIA WITHOUT COMA, WITH LONG-TERM CURRENT USE OF INSULIN (HCC): ICD-10-CM

## 2024-09-20 PROBLEM — L97.212 LOWER LIMB ULCER, CALF, RIGHT, WITH FAT LAYER EXPOSED (HCC): Status: RESOLVED | Noted: 2024-04-01 | Resolved: 2024-09-20

## 2024-09-20 PROBLEM — N18.9 ACUTE KIDNEY INJURY SUPERIMPOSED ON CKD (HCC): Status: RESOLVED | Noted: 2022-04-12 | Resolved: 2024-09-20

## 2024-09-20 PROBLEM — I70.232 ATHEROSCLEROSIS OF NATIVE ARTERY OF RIGHT LOWER EXTREMITY WITH ULCERATION OF CALF (HCC): Status: RESOLVED | Noted: 2024-03-06 | Resolved: 2024-09-20

## 2024-09-20 PROBLEM — L97.211 LOWER LIMB ULCER, CALF, RIGHT, LIMITED TO BREAKDOWN OF SKIN (HCC): Status: RESOLVED | Noted: 2024-03-06 | Resolved: 2024-09-20

## 2024-09-20 PROBLEM — I46.9: Status: RESOLVED | Noted: 2024-02-15 | Resolved: 2024-09-20

## 2024-09-20 PROBLEM — N17.9 ACUTE KIDNEY INJURY SUPERIMPOSED ON CKD (HCC): Status: RESOLVED | Noted: 2022-04-12 | Resolved: 2024-09-20

## 2024-09-20 PROBLEM — I44.30 ATRIOVENTRICULAR BLOCK: Status: RESOLVED | Noted: 2024-05-08 | Resolved: 2024-09-20

## 2024-09-20 PROCEDURE — 3078F DIAST BP <80 MM HG: CPT | Performed by: STUDENT IN AN ORGANIZED HEALTH CARE EDUCATION/TRAINING PROGRAM

## 2024-09-20 PROCEDURE — 4004F PT TOBACCO SCREEN RCVD TLK: CPT | Performed by: STUDENT IN AN ORGANIZED HEALTH CARE EDUCATION/TRAINING PROGRAM

## 2024-09-20 PROCEDURE — 3017F COLORECTAL CA SCREEN DOC REV: CPT | Performed by: STUDENT IN AN ORGANIZED HEALTH CARE EDUCATION/TRAINING PROGRAM

## 2024-09-20 PROCEDURE — 99214 OFFICE O/P EST MOD 30 MIN: CPT | Performed by: STUDENT IN AN ORGANIZED HEALTH CARE EDUCATION/TRAINING PROGRAM

## 2024-09-20 PROCEDURE — 2022F DILAT RTA XM EVC RTNOPTHY: CPT | Performed by: STUDENT IN AN ORGANIZED HEALTH CARE EDUCATION/TRAINING PROGRAM

## 2024-09-20 PROCEDURE — G8417 CALC BMI ABV UP PARAM F/U: HCPCS | Performed by: STUDENT IN AN ORGANIZED HEALTH CARE EDUCATION/TRAINING PROGRAM

## 2024-09-20 PROCEDURE — G8427 DOCREV CUR MEDS BY ELIG CLIN: HCPCS | Performed by: STUDENT IN AN ORGANIZED HEALTH CARE EDUCATION/TRAINING PROGRAM

## 2024-09-20 PROCEDURE — 1090F PRES/ABSN URINE INCON ASSESS: CPT | Performed by: STUDENT IN AN ORGANIZED HEALTH CARE EDUCATION/TRAINING PROGRAM

## 2024-09-20 PROCEDURE — 1123F ACP DISCUSS/DSCN MKR DOCD: CPT | Performed by: STUDENT IN AN ORGANIZED HEALTH CARE EDUCATION/TRAINING PROGRAM

## 2024-09-20 PROCEDURE — 3044F HG A1C LEVEL LT 7.0%: CPT | Performed by: STUDENT IN AN ORGANIZED HEALTH CARE EDUCATION/TRAINING PROGRAM

## 2024-09-20 PROCEDURE — 3023F SPIROM DOC REV: CPT | Performed by: STUDENT IN AN ORGANIZED HEALTH CARE EDUCATION/TRAINING PROGRAM

## 2024-09-20 PROCEDURE — G8399 PT W/DXA RESULTS DOCUMENT: HCPCS | Performed by: STUDENT IN AN ORGANIZED HEALTH CARE EDUCATION/TRAINING PROGRAM

## 2024-09-20 PROCEDURE — 3074F SYST BP LT 130 MM HG: CPT | Performed by: STUDENT IN AN ORGANIZED HEALTH CARE EDUCATION/TRAINING PROGRAM

## 2024-09-20 RX ORDER — ASPIRIN 81 MG/1
81 TABLET ORAL DAILY
COMMUNITY

## 2024-09-20 RX ORDER — INSULIN DEGLUDEC 200 U/ML
5 INJECTION, SOLUTION SUBCUTANEOUS NIGHTLY
Qty: 3 ADJUSTABLE DOSE PRE-FILLED PEN SYRINGE | Refills: 0
Start: 2024-09-20

## 2024-09-20 RX ORDER — OMEPRAZOLE 40 MG/1
40 CAPSULE, DELAYED RELEASE ORAL DAILY
COMMUNITY

## 2024-09-20 SDOH — ECONOMIC STABILITY: INCOME INSECURITY: HOW HARD IS IT FOR YOU TO PAY FOR THE VERY BASICS LIKE FOOD, HOUSING, MEDICAL CARE, AND HEATING?: NOT HARD AT ALL

## 2024-09-20 SDOH — ECONOMIC STABILITY: FOOD INSECURITY: WITHIN THE PAST 12 MONTHS, THE FOOD YOU BOUGHT JUST DIDN'T LAST AND YOU DIDN'T HAVE MONEY TO GET MORE.: NEVER TRUE

## 2024-09-20 SDOH — ECONOMIC STABILITY: FOOD INSECURITY: WITHIN THE PAST 12 MONTHS, YOU WORRIED THAT YOUR FOOD WOULD RUN OUT BEFORE YOU GOT MONEY TO BUY MORE.: NEVER TRUE

## 2024-09-26 RX ORDER — TRAZODONE HYDROCHLORIDE 150 MG/1
150 TABLET ORAL NIGHTLY
Qty: 90 TABLET | Refills: 0 | Status: SHIPPED | OUTPATIENT
Start: 2024-09-26 | End: 2024-12-25

## 2024-10-09 ENCOUNTER — TELEPHONE (OUTPATIENT)
Dept: FAMILY MEDICINE CLINIC | Age: 69
End: 2024-10-09

## 2024-10-09 NOTE — TELEPHONE ENCOUNTER
lidocaine (XYLOCAINE) 4 % external solution      Pt would like a refill, this is from previous doctor, please send to [facility] on [address]

## 2024-10-14 ENCOUNTER — HOSPITAL ENCOUNTER (OUTPATIENT)
Dept: OTHER | Age: 69
Setting detail: THERAPIES SERIES
Discharge: HOME | End: 2024-10-14
Attending: PODIATRIST
Payer: MEDICARE

## 2024-10-14 VITALS
SYSTOLIC BLOOD PRESSURE: 142 MMHG | HEART RATE: 78 BPM | WEIGHT: 186 LBS | DIASTOLIC BLOOD PRESSURE: 80 MMHG | RESPIRATION RATE: 18 BRPM | OXYGEN SATURATION: 94 % | BODY MASS INDEX: 34.02 KG/M2

## 2024-10-14 LAB
ANION GAP SERPL CALCULATED.3IONS-SCNC: 8 MMOL/L (ref 7–16)
BNP SERPL-MCNC: 659 PG/ML (ref 0–125)
BUN SERPL-MCNC: 27 MG/DL (ref 6–23)
CALCIUM SERPL-MCNC: 9 MG/DL (ref 8.6–10.2)
CHLORIDE SERPL-SCNC: 105 MMOL/L (ref 98–107)
CO2 SERPL-SCNC: 23 MMOL/L (ref 22–29)
CREAT SERPL-MCNC: 1.7 MG/DL (ref 0.5–1)
GFR, ESTIMATED: 33 ML/MIN/1.73M2
GLUCOSE SERPL-MCNC: 87 MG/DL (ref 74–99)
POTASSIUM SERPL-SCNC: 4.4 MMOL/L (ref 3.5–5)
SODIUM SERPL-SCNC: 136 MMOL/L (ref 132–146)

## 2024-10-14 PROCEDURE — 99214 OFFICE O/P EST MOD 30 MIN: CPT

## 2024-10-14 PROCEDURE — 80048 BASIC METABOLIC PNL TOTAL CA: CPT

## 2024-10-14 PROCEDURE — 83880 ASSAY OF NATRIURETIC PEPTIDE: CPT

## 2024-10-14 PROCEDURE — 36415 COLL VENOUS BLD VENIPUNCTURE: CPT

## 2024-10-14 ASSESSMENT — PATIENT HEALTH QUESTIONNAIRE - PHQ9
SUM OF ALL RESPONSES TO PHQ QUESTIONS 1-9: 0
SUM OF ALL RESPONSES TO PHQ9 QUESTIONS 1 & 2: 0
SUM OF ALL RESPONSES TO PHQ QUESTIONS 1-9: 0
1. LITTLE INTEREST OR PLEASURE IN DOING THINGS: NOT AT ALL
SUM OF ALL RESPONSES TO PHQ QUESTIONS 1-9: 0
SUM OF ALL RESPONSES TO PHQ QUESTIONS 1-9: 0
2. FEELING DOWN, DEPRESSED OR HOPELESS: NOT AT ALL

## 2024-10-14 NOTE — PROGRESS NOTES
Sounds: Active  RLQ Bowel Sounds: Active  LLQ Bowel Sounds: Active  GI Symptoms: Reduction in appetite, Nausea (nausea/vomiting/diarrhea/chills x 3 days)   Gastrointestinal  GI Symptoms: Reduction in appetite, Nausea (nausea/vomiting/diarrhea/chills x 3 days)  Bowel Sounds  RUQ Bowel Sounds: Active  LUQ Bowel Sounds: Active  RLQ Bowel Sounds: Active  LLQ Bowel Sounds: Active  Peripheral Vascular  Peripheral Vascular (WDL): Within Defined Limits  Edema: None        Musculoskeletal  RL Extremity: Unsteady  LL Extremity: Unsteady                    Pulse: 78               LAB DATA:  BMP:  Sodium (mmol/L)   Date Value   10/14/2024 136   08/26/2024 138   08/19/2024 137     Potassium (mmol/L)   Date Value   10/14/2024 4.4   08/26/2024 4.9   08/19/2024 4.6     Potassium reflex Magnesium (mmol/L)   Date Value   03/14/2023 4.6   10/17/2022 4.8   10/12/2022 4.8     Chloride (mmol/L)   Date Value   10/14/2024 105   08/26/2024 106   08/19/2024 107     CO2 (mmol/L)   Date Value   10/14/2024 23   08/26/2024 23   08/19/2024 23     BUN (mg/dL)   Date Value   10/14/2024 27 (H)   08/26/2024 38 (H)   08/19/2024 36 (H)     Creatinine (mg/dL)   Date Value   10/14/2024 1.7 (H)   08/26/2024 1.6 (H)   08/19/2024 1.7 (H)     Glucose (mg/dL)   Date Value   10/14/2024 87   08/26/2024 100 (H)   08/19/2024 104 (H)   12/01/2011 181 (H)   10/05/2011 133 (H)   08/21/2011 94     Calcium (mg/dL)   Date Value   10/14/2024 9.0   08/26/2024 8.4 (L)   08/19/2024 8.6     BNP:  NT Pro-BNP (pg/mL)   Date Value   10/14/2024 659 (H)     Pro-BNP (pg/mL)   Date Value   07/24/2024 600 (H)   06/22/2024 462 (H)   04/24/2024 1,187 (H)      CBC:  WBC (k/uL)   Date Value   07/03/2024 6.3     Hemoglobin (g/dL)   Date Value   07/03/2024 10.1 (L)     Hematocrit (%)   Date Value   07/03/2024 33.5 (L)     Platelets (k/uL)   Date Value   07/03/2024 244     Iron Studies:  Ferritin (ng/mL)   Date Value   12/09/2023 804     Iron (ug/dL)   Date Value   12/09/2023 22 (L)

## 2024-10-17 ENCOUNTER — HOSPITAL ENCOUNTER (OUTPATIENT)
Dept: OTHER | Age: 69
Discharge: HOME | End: 2024-10-17
Attending: PODIATRIST

## 2024-10-22 ENCOUNTER — TELEPHONE (OUTPATIENT)
Dept: FAMILY MEDICINE CLINIC | Age: 69
End: 2024-10-22

## 2024-10-22 DIAGNOSIS — M54.6 CHRONIC THORACIC BACK PAIN, UNSPECIFIED BACK PAIN LATERALITY: Primary | Chronic | ICD-10-CM

## 2024-10-22 DIAGNOSIS — G89.29 CHRONIC THORACIC BACK PAIN, UNSPECIFIED BACK PAIN LATERALITY: Primary | Chronic | ICD-10-CM

## 2024-10-22 NOTE — TELEPHONE ENCOUNTER
Pt called and said this is the second time she calls for this med.  She said she called last week about it and still med.        lidocaine (XYLOCAINE) 4 % external solution         [facility]/pharmacy #[id number] - [place], [place] -   [address] [phone] - F [phone]

## 2024-10-24 ENCOUNTER — OFFICE VISIT (OUTPATIENT)
Dept: CARDIOLOGY CLINIC | Age: 69
End: 2024-10-24

## 2024-10-24 VITALS
RESPIRATION RATE: 18 BRPM | WEIGHT: 184.8 LBS | HEIGHT: 62 IN | SYSTOLIC BLOOD PRESSURE: 140 MMHG | DIASTOLIC BLOOD PRESSURE: 68 MMHG | BODY MASS INDEX: 34.01 KG/M2 | HEART RATE: 76 BPM

## 2024-10-24 DIAGNOSIS — I25.10 CORONARY ARTERY DISEASE INVOLVING NATIVE CORONARY ARTERY OF NATIVE HEART WITHOUT ANGINA PECTORIS: Primary | ICD-10-CM

## 2024-10-24 RX ORDER — CARVEDILOL 3.12 MG/1
3.12 TABLET ORAL 2 TIMES DAILY WITH MEALS
Qty: 180 TABLET | Refills: 0 | Status: SHIPPED | OUTPATIENT
Start: 2024-10-24 | End: 2025-01-22

## 2024-10-24 RX ORDER — MINOXIDIL 2.5 MG/1
2.5 TABLET ORAL 2 TIMES DAILY
Qty: 60 TABLET | Refills: 5 | Status: SHIPPED | OUTPATIENT
Start: 2024-10-24

## 2024-10-24 RX ORDER — SACUBITRIL AND VALSARTAN 49; 51 MG/1; MG/1
1 TABLET, FILM COATED ORAL 2 TIMES DAILY
Qty: 60 TABLET | Refills: 11 | Status: SHIPPED | OUTPATIENT
Start: 2024-10-24

## 2024-10-24 RX ORDER — HYDRALAZINE HYDROCHLORIDE 50 MG/1
50 TABLET, FILM COATED ORAL 3 TIMES DAILY
Qty: 90 TABLET | Refills: 3 | Status: SHIPPED | OUTPATIENT
Start: 2024-10-24

## 2024-10-24 RX ORDER — BUMETANIDE 1 MG/1
1 TABLET ORAL DAILY PRN
Qty: 30 TABLET | Refills: 4 | Status: SHIPPED | OUTPATIENT
Start: 2024-10-24

## 2024-10-24 RX ORDER — DULOXETIN HYDROCHLORIDE 60 MG/1
60 CAPSULE, DELAYED RELEASE ORAL DAILY
COMMUNITY
Start: 2024-10-16

## 2024-10-24 RX ORDER — SIMVASTATIN 20 MG
20 TABLET ORAL NIGHTLY
Qty: 90 TABLET | Refills: 3 | Status: SHIPPED | OUTPATIENT
Start: 2024-10-24

## 2024-10-24 NOTE — PROGRESS NOTES
OUTPATIENT CARDIOLOGY FOLLOW-UP    Name: Steph Gonzáles    Age: 68 y.o.    Primary Care Physician: Ulises Skinner DO    Date of Service: 10/24/2024    Chief Complaint:   Chief Complaint   Patient presents with    Coronary Artery Disease     3 month office visit       Interim History:  Mrs. Gonzáles is a 68-year-old  female with history of for coronary artery disease underwent CABG in 2011 and history of severe LV dysfunction with an EF of 25% based on echocardiogram done in 2013, cardiac cath in 2013 showed no progression of disease, chronic right bundle-branch block, type II diabetes, hypertension, hyperlipidemia, morbid obesity, active tobacco use still smoking about 10 cigarettes a day, marijuana use, obstructive sleep apnea on CPAP and history of severe peripheral vascular disease came for follow-up visit. She was last seen in the office was on 7/6/2022, Since her last evaluation, she underwent hiatal hernia surgery on 8/11/22 without any perioperative cardiac events. She was also hospitalized in 8/22 with COPD exacerbation.     She was hospitalized from 7/15/2021 to 7/21/2021 with acute hypoxic respiratory failure secondary to acute COPD exacerbation and acute on chronic HFpEF.  Since she was discharged from the hospital she is feeling much better however, she still smoking marijuana and tobacco.  She denies any significant leg edema.      she was hospitalized from 4/19/2021 to 4/25/2021 with a COVID-19 pneumonia and was treated with steroids and antibiotics..  She underwent left popliteal and tibial trunk atherectomy on 11/13/2018 and was initiated on plavix and aspirin.    She was discharged home on oxygen and she has been using mainly at nighttime. She denies any chest pain, increased dyspnea or leg edema. She is still smoking about 5 cigarettes a day and does not want to quit.      Last visit in 6/23, went to ER, 3 times, in 12/5/23 with fall , in 12/10/23 with metabolic encephalopathy from

## 2024-10-24 NOTE — PATIENT INSTRUCTIONS
Patient was advised to follow-up with Dr. Hernandez from vascular surgery to rule out severe PAD due to on going pain.   She is on guideline directed medical therapy for heart failure with improved ejection fraction, continue Entresto 49/51  mg  p.o. twice daily,  coreg 3.125 mg po twice a day, Imdur 60 mg po daily and hydralazine 50 mg po 3 times a day  Minoxidil 2.5 mg po twice a day for hypertension.   Follow up with EP service as scheduled.   She was strongly encouraged to adhere to strict cardiac diet and restrict daily salt intake to less than 2 g  Continue Imdur 60 mg p.o. daily.   She was advised again to stop smoking and told me that she does not want to quit.    Advised to follow-up with the GI service for evaluation of constipation and abdominal discomfort.  Follow up in 6 months.

## 2024-10-30 RX ORDER — LIDOCAINE 4 G/G
1 PATCH TOPICAL DAILY
Qty: 90 PATCH | Refills: 0 | Status: SHIPPED | OUTPATIENT
Start: 2024-10-30 | End: 2025-01-28

## 2024-10-30 NOTE — TELEPHONE ENCOUNTER
Pt called back states that the Rx is supposed to be for the Lidocaine patches, pt used to get these from Dr. Rubio (previous PCP per pt) but now comes here, pt uses the patches for back pain. Please advise.

## 2024-11-01 RX ORDER — LINACLOTIDE 290 UG/1
290 CAPSULE, GELATIN COATED ORAL
Qty: 30 CAPSULE | Refills: 2 | Status: CANCELLED | OUTPATIENT
Start: 2024-11-01

## 2024-11-01 NOTE — TELEPHONE ENCOUNTER
Name of Medication(s) Requested:  Requested Prescriptions     Pending Prescriptions Disp Refills    LINZESS 290 MCG CAPS capsule 30 capsule 2     Sig: Take 1 capsule by mouth every morning (before breakfast)       Medication is on current medication list Yes    Dosage and directions were verified? Yes    Quantity verified: 30 day supply     Pharmacy Verified?  Yes    Last Appointment:  9/20/2024    Future appts:  Future Appointments   Date Time Provider Department Center   11/7/2024  1:15 PM Pino Hernandez MD VASC/MED [facility]   11/20/2024 11:00 AM Ulises Skinner DO [place] DEP   11/26/2024  2:00 PM Karla Leary, APRN - CNP ELECTRO PHYS [facility]   11/26/2024  2:00 PM SCHEDULE, DEVICE CLINIC 1 [place] ELECTRO PHYS [facility]   2/4/2025  9:00 AM Danielle Morrison, APRN - NP BD ENDO [facility]   4/14/2025 12:00 PM [facility] ROOM 2 [facility]        (If no appt send self scheduling link. .REFILLAPPT)  Scheduling request sent?     [] Yes  [x] No    Does patient need updated?  [] Yes  [x] No

## 2024-11-01 NOTE — TELEPHONE ENCOUNTER
Pt called for refill on these meds. She said she has her last dosage for today.  Pt said she doesn't have anymore left after today.    LINZESS 290 MCG   Acu check [employer] [place] [employer]    [facility]/pharmacy #[id number] - [place], [place] -   [address] [phone] - F [phone]

## 2024-11-07 ENCOUNTER — OFFICE VISIT (OUTPATIENT)
Dept: VASCULAR SURGERY | Age: 69
End: 2024-11-07
Payer: MEDICARE

## 2024-11-07 DIAGNOSIS — Z98.62 STATUS POST PERIPHERAL ARTERY ANGIOPLASTY: ICD-10-CM

## 2024-11-07 DIAGNOSIS — M79.604 PAIN IN RIGHT LEG: Primary | ICD-10-CM

## 2024-11-07 DIAGNOSIS — I73.9 PVD (PERIPHERAL VASCULAR DISEASE) WITH CLAUDICATION (HCC): ICD-10-CM

## 2024-11-07 PROBLEM — R19.7 NAUSEA VOMITING AND DIARRHEA: Status: RESOLVED | Noted: 2024-06-23 | Resolved: 2024-11-07

## 2024-11-07 PROBLEM — R10.84 GENERALIZED ABDOMINAL PAIN: Status: RESOLVED | Noted: 2024-06-23 | Resolved: 2024-11-07

## 2024-11-07 PROBLEM — R11.2 NAUSEA VOMITING AND DIARRHEA: Status: RESOLVED | Noted: 2024-06-23 | Resolved: 2024-11-07

## 2024-11-07 PROBLEM — G93.41 METABOLIC ENCEPHALOPATHY: Status: RESOLVED | Noted: 2023-12-10 | Resolved: 2024-11-07

## 2024-11-07 PROBLEM — B35.3 TINEA PEDIS OF BOTH FEET: Status: RESOLVED | Noted: 2024-04-15 | Resolved: 2024-11-07

## 2024-11-07 PROCEDURE — 1159F MED LIST DOCD IN RCRD: CPT | Performed by: SURGERY

## 2024-11-07 PROCEDURE — 99214 OFFICE O/P EST MOD 30 MIN: CPT | Performed by: SURGERY

## 2024-11-07 PROCEDURE — G8417 CALC BMI ABV UP PARAM F/U: HCPCS | Performed by: SURGERY

## 2024-11-07 PROCEDURE — G8399 PT W/DXA RESULTS DOCUMENT: HCPCS | Performed by: SURGERY

## 2024-11-07 PROCEDURE — 1090F PRES/ABSN URINE INCON ASSESS: CPT | Performed by: SURGERY

## 2024-11-07 PROCEDURE — 3017F COLORECTAL CA SCREEN DOC REV: CPT | Performed by: SURGERY

## 2024-11-07 PROCEDURE — G8427 DOCREV CUR MEDS BY ELIG CLIN: HCPCS | Performed by: SURGERY

## 2024-11-07 PROCEDURE — G8484 FLU IMMUNIZE NO ADMIN: HCPCS | Performed by: SURGERY

## 2024-11-07 PROCEDURE — 4004F PT TOBACCO SCREEN RCVD TLK: CPT | Performed by: SURGERY

## 2024-11-07 PROCEDURE — 1123F ACP DISCUSS/DSCN MKR DOCD: CPT | Performed by: SURGERY

## 2024-11-07 NOTE — PROGRESS NOTES
Chief Complaint:   Chief Complaint   Patient presents with    Circulatory Problem     Right leg pain since July after JHON boot was removed.         HPI: Patient came to the office, using a walker, for the evaluation of vascular status of the legs, tells me, has going on for last several months, tells me that it happened after the application of a bandage to the right leg but this pain extend from the hip all the way to the foot, constant    Patient denies any known history of back problems, herniated disc etc. or arthritis    Patient has multiple medical respect including coronary artery disease,, cardiomyopathy chronic hypoxic respiratory failure, history of tobacco use, diabetes mellitus etc.      Patient denies any focal lateralizing neurological symptoms like loss of speech, vision or loss of function of extremity    Patient can walk short distances slowly with the help of a walker, and denies any symptoms of rest pain    Allergies   Allergen Reactions    Latex Hives    Bee Venom Anaphylaxis    Dilaudid [Hydromorphone Hcl] Itching    Dye [Iodides] Hives and Shortness Of Breath    Keflex [Cephalexin] Itching and Rash    Bee Pollen     Cetirizine & Related Hives    Fentanyl Itching    Lasix [Furosemide] Other (See Comments)     Pt states she cramps up and gets headaches    Levaquin [Levofloxacin In D5w] Hives    Lipitor      MUSCLE SPASMS    Lyrica [Pregabalin]      Dream disturbances    Morphine Hives    Naproxen      Unsure of reaction;pt states able to take Aleve without difficulties    Nefazodone Other (See Comments)    Norvasc [Amlodipine] Swelling    Oxycodone-Acetaminophen Swelling    Shellfish-Derived Products     Trazodone And Nefazodone        Current Outpatient Medications   Medication Sig Dispense Refill    DULoxetine (CYMBALTA) 60 MG extended release capsule Take 1 capsule by mouth daily      carvedilol (COREG) 3.125 MG tablet Take 1 tablet by mouth 2 times daily (with meals) 180 tablet 0

## 2024-11-08 ENCOUNTER — TELEPHONE (OUTPATIENT)
Dept: VASCULAR SURGERY | Age: 69
End: 2024-11-08

## 2024-11-08 NOTE — TELEPHONE ENCOUNTER
I called Steph and left a message, to inform her that she has a scheduled appointment at [facility] . It is on November 19,2024 at a 10:30 arrival time to register. She is to bring a list of her medications, photo Id., and insurance cards with her. Steph is to enter threw the main entrance A.

## 2024-11-11 ENCOUNTER — HOSPITAL ENCOUNTER (OUTPATIENT)
Age: 69
Discharge: HOME | End: 2024-11-11
Payer: MEDICARE

## 2024-11-11 LAB
25(OH)D3 SERPL-MCNC: 35.2 NG/ML (ref 30–100)
ANION GAP SERPL CALCULATED.3IONS-SCNC: 9 MMOL/L (ref 7–16)
BASOPHILS # BLD: 0.02 K/UL (ref 0–0.2)
BASOPHILS NFR BLD: 0 % (ref 0–2)
BILIRUB UR QL STRIP: NEGATIVE
BUN SERPL-MCNC: 30 MG/DL (ref 6–23)
CALCIUM SERPL-MCNC: 8.9 MG/DL (ref 8.6–10.2)
CHLORIDE SERPL-SCNC: 106 MMOL/L (ref 98–107)
CLARITY UR: CLEAR
CO2 SERPL-SCNC: 25 MMOL/L (ref 22–29)
COLOR UR: YELLOW
CREAT SERPL-MCNC: 1.6 MG/DL (ref 0.5–1)
CREAT UR-MCNC: 59.3 MG/DL (ref 29–226)
EOSINOPHIL # BLD: 0.1 K/UL (ref 0.05–0.5)
EOSINOPHILS RELATIVE PERCENT: 2 % (ref 0–6)
ERYTHROCYTE [DISTWIDTH] IN BLOOD BY AUTOMATED COUNT: 12.7 % (ref 11.5–15)
GFR, ESTIMATED: 34 ML/MIN/1.73M2
GLUCOSE SERPL-MCNC: 121 MG/DL (ref 74–99)
GLUCOSE UR STRIP-MCNC: NEGATIVE MG/DL
HCT VFR BLD AUTO: 37.5 % (ref 34–48)
HGB BLD-MCNC: 11.4 G/DL (ref 11.5–15.5)
HGB UR QL STRIP.AUTO: NEGATIVE
IMM GRANULOCYTES # BLD AUTO: <0.03 K/UL (ref 0–0.58)
IMM GRANULOCYTES NFR BLD: 0 % (ref 0–5)
KETONES UR STRIP-MCNC: NEGATIVE MG/DL
LEUKOCYTE ESTERASE UR QL STRIP: NEGATIVE
LYMPHOCYTES NFR BLD: 1.01 K/UL (ref 1.5–4)
LYMPHOCYTES RELATIVE PERCENT: 15 % (ref 20–42)
MAGNESIUM SERPL-MCNC: 1.8 MG/DL (ref 1.6–2.6)
MCH RBC QN AUTO: 31.8 PG (ref 26–35)
MCHC RBC AUTO-ENTMCNC: 30.4 G/DL (ref 32–34.5)
MCV RBC AUTO: 104.7 FL (ref 80–99.9)
MICROALBUMIN UR-MCNC: 284 MG/L (ref 0–19)
MICROALBUMIN/CREAT UR-RTO: 479 MCG/MG CREAT (ref 0–30)
MONOCYTES NFR BLD: 0.36 K/UL (ref 0.1–0.95)
MONOCYTES NFR BLD: 5 % (ref 2–12)
NEUTROPHILS NFR BLD: 78 % (ref 43–80)
NEUTS SEG NFR BLD: 5.25 K/UL (ref 1.8–7.3)
NITRITE UR QL STRIP: NEGATIVE
PH UR STRIP: 6 [PH] (ref 5–9)
PHOSPHATE SERPL-MCNC: 3.3 MG/DL (ref 2.5–4.5)
PLATELET # BLD AUTO: 238 K/UL (ref 130–450)
PMV BLD AUTO: 10.1 FL (ref 7–12)
POTASSIUM SERPL-SCNC: 4 MMOL/L (ref 3.5–5)
PROT UR STRIP-MCNC: 30 MG/DL
PTH-INTACT SERPL-MCNC: 44.4 PG/ML (ref 15–65)
RBC # BLD AUTO: 3.58 M/UL (ref 3.5–5.5)
RBC #/AREA URNS HPF: NORMAL /HPF
SODIUM SERPL-SCNC: 140 MMOL/L (ref 132–146)
SP GR UR STRIP: 1.02 (ref 1–1.03)
URATE SERPL-MCNC: 4.4 MG/DL (ref 2.4–5.7)
UROBILINOGEN UR STRIP-ACNC: 0.2 EU/DL (ref 0–1)
WBC #/AREA URNS HPF: NORMAL /HPF
WBC OTHER # BLD: 6.8 K/UL (ref 4.5–11.5)

## 2024-11-11 PROCEDURE — 81001 URINALYSIS AUTO W/SCOPE: CPT

## 2024-11-11 PROCEDURE — 36415 COLL VENOUS BLD VENIPUNCTURE: CPT

## 2024-11-11 PROCEDURE — 80048 BASIC METABOLIC PNL TOTAL CA: CPT

## 2024-11-11 PROCEDURE — 84550 ASSAY OF BLOOD/URIC ACID: CPT

## 2024-11-11 PROCEDURE — 84100 ASSAY OF PHOSPHORUS: CPT

## 2024-11-11 PROCEDURE — 82043 UR ALBUMIN QUANTITATIVE: CPT

## 2024-11-11 PROCEDURE — 83735 ASSAY OF MAGNESIUM: CPT

## 2024-11-11 PROCEDURE — 83970 ASSAY OF PARATHORMONE: CPT

## 2024-11-11 PROCEDURE — 85025 COMPLETE CBC W/AUTO DIFF WBC: CPT

## 2024-11-11 PROCEDURE — 82570 ASSAY OF URINE CREATININE: CPT

## 2024-11-11 PROCEDURE — 82306 VITAMIN D 25 HYDROXY: CPT

## 2024-11-12 ENCOUNTER — HOSPITAL ENCOUNTER (OUTPATIENT)
Age: 69
Setting detail: OBSERVATION
Discharge: HOME | End: 2024-11-13
Attending: STUDENT IN AN ORGANIZED HEALTH CARE EDUCATION/TRAINING PROGRAM | Admitting: STUDENT IN AN ORGANIZED HEALTH CARE EDUCATION/TRAINING PROGRAM
Payer: MEDICARE

## 2024-11-12 ENCOUNTER — APPOINTMENT (OUTPATIENT)
Dept: GENERAL RADIOLOGY | Age: 69
End: 2024-11-12
Payer: MEDICARE

## 2024-11-12 ENCOUNTER — CLINICAL DOCUMENTATION (OUTPATIENT)
Dept: FAMILY MEDICINE CLINIC | Age: 69
End: 2024-11-12

## 2024-11-12 ENCOUNTER — HOSPITAL ENCOUNTER (OUTPATIENT)
Age: 69
Discharge: HOME | End: 2024-11-14
Payer: MEDICARE

## 2024-11-12 DIAGNOSIS — R00.1 BRADYCARDIA: Primary | ICD-10-CM

## 2024-11-12 PROBLEM — I97.89 BRADYCARDIA FOLLOWING SURGERY: Status: ACTIVE | Noted: 2024-11-12

## 2024-11-12 LAB
25(OH)D3 SERPL-MCNC: 37.9 NG/ML (ref 30–100)
ALBUMIN SERPL-MCNC: 3.8 G/DL (ref 3.5–5.2)
ALP SERPL-CCNC: 71 U/L (ref 35–104)
ALT SERPL-CCNC: 20 U/L (ref 0–32)
ANION GAP SERPL CALCULATED.3IONS-SCNC: 9 MMOL/L (ref 7–16)
AST SERPL-CCNC: 17 U/L (ref 0–31)
BASOPHILS # BLD: 0.02 K/UL (ref 0–0.2)
BASOPHILS NFR BLD: 0 % (ref 0–2)
BILIRUB SERPL-MCNC: 0.3 MG/DL (ref 0–1.2)
BNP SERPL-MCNC: 846 PG/ML (ref 0–125)
BUN SERPL-MCNC: 26 MG/DL (ref 6–23)
CALCIUM SERPL-MCNC: 8.5 MG/DL (ref 8.6–10.2)
CHLORIDE SERPL-SCNC: 106 MMOL/L (ref 98–107)
CHOLEST SERPL-MCNC: 126 MG/DL
CO2 SERPL-SCNC: 23 MMOL/L (ref 22–29)
CREAT SERPL-MCNC: 1.3 MG/DL (ref 0.5–1)
EOSINOPHIL # BLD: 0.08 K/UL (ref 0.05–0.5)
EOSINOPHILS RELATIVE PERCENT: 1 % (ref 0–6)
ERYTHROCYTE [DISTWIDTH] IN BLOOD BY AUTOMATED COUNT: 12.8 % (ref 11.5–15)
FOLATE SERPL-MCNC: 18.2 NG/ML (ref 4.8–24.2)
GFR, ESTIMATED: 44 ML/MIN/1.73M2
GLUCOSE BLD-MCNC: 111 MG/DL (ref 74–99)
GLUCOSE BLD-MCNC: 147 MG/DL (ref 74–99)
GLUCOSE SERPL-MCNC: 122 MG/DL (ref 74–99)
HBA1C MFR BLD: 5.6 % (ref 4–5.6)
HCT VFR BLD AUTO: 37.3 % (ref 34–48)
HDLC SERPL-MCNC: 46 MG/DL
HGB BLD-MCNC: 11.5 G/DL (ref 11.5–15.5)
IMM GRANULOCYTES # BLD AUTO: 0.04 K/UL (ref 0–0.58)
IMM GRANULOCYTES NFR BLD: 1 % (ref 0–5)
IRON SATN MFR SERPL: 21 % (ref 15–50)
IRON SERPL-MCNC: 58 UG/DL (ref 37–145)
LDLC SERPL CALC-MCNC: 63 MG/DL
LYMPHOCYTES NFR BLD: 0.89 K/UL (ref 1.5–4)
LYMPHOCYTES RELATIVE PERCENT: 11 % (ref 20–42)
MAGNESIUM SERPL-MCNC: 1.7 MG/DL (ref 1.6–2.6)
MCH RBC QN AUTO: 31.3 PG (ref 26–35)
MCHC RBC AUTO-ENTMCNC: 30.8 G/DL (ref 32–34.5)
MCV RBC AUTO: 101.4 FL (ref 80–99.9)
MONOCYTES NFR BLD: 0.31 K/UL (ref 0.1–0.95)
MONOCYTES NFR BLD: 4 % (ref 2–12)
NEUTROPHILS NFR BLD: 83 % (ref 43–80)
NEUTS SEG NFR BLD: 6.69 K/UL (ref 1.8–7.3)
PLATELET # BLD AUTO: 241 K/UL (ref 130–450)
PMV BLD AUTO: 10.3 FL (ref 7–12)
POTASSIUM SERPL-SCNC: 4.4 MMOL/L (ref 3.5–5)
PROT SERPL-MCNC: 6.7 G/DL (ref 6.4–8.3)
RBC # BLD AUTO: 3.68 M/UL (ref 3.5–5.5)
SODIUM SERPL-SCNC: 138 MMOL/L (ref 132–146)
TIBC SERPL-MCNC: 270 UG/DL (ref 250–450)
TRIGL SERPL-MCNC: 86 MG/DL
TROPONIN I SERPL HS-MCNC: 19 NG/L (ref 0–9)
TROPONIN I SERPL HS-MCNC: 20 NG/L (ref 0–9)
TSH SERPL DL<=0.05 MIU/L-ACNC: 1.21 UIU/ML (ref 0.27–4.2)
VIT B12 SERPL-MCNC: 499 PG/ML (ref 211–946)
VLDLC SERPL CALC-MCNC: 17 MG/DL
WBC OTHER # BLD: 8 K/UL (ref 4.5–11.5)

## 2024-11-12 PROCEDURE — 84443 ASSAY THYROID STIM HORMONE: CPT

## 2024-11-12 PROCEDURE — 83735 ASSAY OF MAGNESIUM: CPT

## 2024-11-12 PROCEDURE — 96372 THER/PROPH/DIAG INJ SC/IM: CPT

## 2024-11-12 PROCEDURE — 94660 CPAP INITIATION&MGMT: CPT

## 2024-11-12 PROCEDURE — 83880 ASSAY OF NATRIURETIC PEPTIDE: CPT

## 2024-11-12 PROCEDURE — 80053 COMPREHEN METABOLIC PANEL: CPT

## 2024-11-12 PROCEDURE — 85025 COMPLETE CBC W/AUTO DIFF WBC: CPT

## 2024-11-12 PROCEDURE — 82962 GLUCOSE BLOOD TEST: CPT

## 2024-11-12 PROCEDURE — 36415 COLL VENOUS BLD VENIPUNCTURE: CPT

## 2024-11-12 PROCEDURE — 82746 ASSAY OF FOLIC ACID SERUM: CPT

## 2024-11-12 PROCEDURE — 83036 HEMOGLOBIN GLYCOSYLATED A1C: CPT

## 2024-11-12 PROCEDURE — 82607 VITAMIN B-12: CPT

## 2024-11-12 PROCEDURE — 2580000003 HC RX 258: Performed by: NURSE PRACTITIONER

## 2024-11-12 PROCEDURE — 71045 X-RAY EXAM CHEST 1 VIEW: CPT

## 2024-11-12 PROCEDURE — 80061 LIPID PANEL: CPT

## 2024-11-12 PROCEDURE — 6370000000 HC RX 637 (ALT 250 FOR IP): Performed by: STUDENT IN AN ORGANIZED HEALTH CARE EDUCATION/TRAINING PROGRAM

## 2024-11-12 PROCEDURE — 6370000000 HC RX 637 (ALT 250 FOR IP): Performed by: NURSE PRACTITIONER

## 2024-11-12 PROCEDURE — 99285 EMERGENCY DEPT VISIT HI MDM: CPT

## 2024-11-12 PROCEDURE — 82306 VITAMIN D 25 HYDROXY: CPT

## 2024-11-12 PROCEDURE — 83540 ASSAY OF IRON: CPT

## 2024-11-12 PROCEDURE — 84484 ASSAY OF TROPONIN QUANT: CPT

## 2024-11-12 PROCEDURE — 96374 THER/PROPH/DIAG INJ IV PUSH: CPT

## 2024-11-12 PROCEDURE — 83550 IRON BINDING TEST: CPT

## 2024-11-12 PROCEDURE — G0378 HOSPITAL OBSERVATION PER HR: HCPCS

## 2024-11-12 PROCEDURE — 6360000002 HC RX W HCPCS: Performed by: NURSE PRACTITIONER

## 2024-11-12 PROCEDURE — 6360000002 HC RX W HCPCS: Performed by: STUDENT IN AN ORGANIZED HEALTH CARE EDUCATION/TRAINING PROGRAM

## 2024-11-12 RX ORDER — ONDANSETRON 4 MG/1
4 TABLET, ORALLY DISINTEGRATING ORAL EVERY 8 HOURS PRN
Status: DISCONTINUED | OUTPATIENT
Start: 2024-11-12 | End: 2024-11-13 | Stop reason: HOSPADM

## 2024-11-12 RX ORDER — POLYETHYLENE GLYCOL 3350 17 G/17G
17 POWDER, FOR SOLUTION ORAL DAILY PRN
Status: DISCONTINUED | OUTPATIENT
Start: 2024-11-12 | End: 2024-11-13 | Stop reason: HOSPADM

## 2024-11-12 RX ORDER — BENZONATATE 100 MG/1
100 CAPSULE ORAL 3 TIMES DAILY PRN
Status: DISCONTINUED | OUTPATIENT
Start: 2024-11-12 | End: 2024-11-13 | Stop reason: HOSPADM

## 2024-11-12 RX ORDER — ENOXAPARIN SODIUM 100 MG/ML
40 INJECTION SUBCUTANEOUS DAILY
Status: DISCONTINUED | OUTPATIENT
Start: 2024-11-12 | End: 2024-11-13 | Stop reason: HOSPADM

## 2024-11-12 RX ORDER — TRAZODONE HYDROCHLORIDE 50 MG/1
150 TABLET, FILM COATED ORAL NIGHTLY
Status: DISCONTINUED | OUTPATIENT
Start: 2024-11-12 | End: 2024-11-13 | Stop reason: HOSPADM

## 2024-11-12 RX ORDER — DOCUSATE SODIUM 100 MG/1
100 CAPSULE, LIQUID FILLED ORAL NIGHTLY
Status: DISCONTINUED | OUTPATIENT
Start: 2024-11-12 | End: 2024-11-13 | Stop reason: HOSPADM

## 2024-11-12 RX ORDER — DULOXETIN HYDROCHLORIDE 30 MG/1
60 CAPSULE, DELAYED RELEASE ORAL DAILY
Status: DISCONTINUED | OUTPATIENT
Start: 2024-11-12 | End: 2024-11-13 | Stop reason: HOSPADM

## 2024-11-12 RX ORDER — SODIUM CHLORIDE 9 MG/ML
INJECTION, SOLUTION INTRAVENOUS PRN
Status: DISCONTINUED | OUTPATIENT
Start: 2024-11-12 | End: 2024-11-13 | Stop reason: HOSPADM

## 2024-11-12 RX ORDER — PROCHLORPERAZINE EDISYLATE 5 MG/ML
5 INJECTION INTRAMUSCULAR; INTRAVENOUS ONCE
Status: COMPLETED | OUTPATIENT
Start: 2024-11-12 | End: 2024-11-12

## 2024-11-12 RX ORDER — SODIUM CHLORIDE 0.9 % (FLUSH) 0.9 %
5-40 SYRINGE (ML) INJECTION PRN
Status: DISCONTINUED | OUTPATIENT
Start: 2024-11-12 | End: 2024-11-13 | Stop reason: HOSPADM

## 2024-11-12 RX ORDER — ATORVASTATIN CALCIUM 10 MG/1
10 TABLET, FILM COATED ORAL DAILY
Status: DISCONTINUED | OUTPATIENT
Start: 2024-11-12 | End: 2024-11-12

## 2024-11-12 RX ORDER — GLUCAGON 1 MG/ML
1 KIT INJECTION PRN
Status: DISCONTINUED | OUTPATIENT
Start: 2024-11-12 | End: 2024-11-13 | Stop reason: HOSPADM

## 2024-11-12 RX ORDER — DEXTROSE MONOHYDRATE 100 MG/ML
INJECTION, SOLUTION INTRAVENOUS CONTINUOUS PRN
Status: DISCONTINUED | OUTPATIENT
Start: 2024-11-12 | End: 2024-11-13 | Stop reason: HOSPADM

## 2024-11-12 RX ORDER — PANTOPRAZOLE SODIUM 40 MG/1
40 TABLET, DELAYED RELEASE ORAL
Status: DISCONTINUED | OUTPATIENT
Start: 2024-11-13 | End: 2024-11-13 | Stop reason: HOSPADM

## 2024-11-12 RX ORDER — BUMETANIDE 1 MG/1
1 TABLET ORAL DAILY
Status: DISCONTINUED | OUTPATIENT
Start: 2024-11-12 | End: 2024-11-13 | Stop reason: HOSPADM

## 2024-11-12 RX ORDER — INSULIN GLARGINE 100 [IU]/ML
4 INJECTION, SOLUTION SUBCUTANEOUS NIGHTLY
Status: DISCONTINUED | OUTPATIENT
Start: 2024-11-12 | End: 2024-11-13 | Stop reason: HOSPADM

## 2024-11-12 RX ORDER — CETIRIZINE HYDROCHLORIDE 10 MG/1
10 TABLET ORAL DAILY
Status: DISCONTINUED | OUTPATIENT
Start: 2024-11-12 | End: 2024-11-13 | Stop reason: HOSPADM

## 2024-11-12 RX ORDER — HYDROXYZINE HYDROCHLORIDE 25 MG/1
25 TABLET, FILM COATED ORAL 3 TIMES DAILY PRN
Status: DISCONTINUED | OUTPATIENT
Start: 2024-11-12 | End: 2024-11-13 | Stop reason: HOSPADM

## 2024-11-12 RX ORDER — ACETAMINOPHEN 650 MG/1
650 SUPPOSITORY RECTAL EVERY 6 HOURS PRN
Status: DISCONTINUED | OUTPATIENT
Start: 2024-11-12 | End: 2024-11-13 | Stop reason: HOSPADM

## 2024-11-12 RX ORDER — ONDANSETRON 2 MG/ML
4 INJECTION INTRAMUSCULAR; INTRAVENOUS EVERY 6 HOURS PRN
Status: DISCONTINUED | OUTPATIENT
Start: 2024-11-12 | End: 2024-11-13 | Stop reason: HOSPADM

## 2024-11-12 RX ORDER — CALCIUM CARBONATE 500 MG/1
500 TABLET, CHEWABLE ORAL 3 TIMES DAILY PRN
Status: DISCONTINUED | OUTPATIENT
Start: 2024-11-12 | End: 2024-11-13 | Stop reason: HOSPADM

## 2024-11-12 RX ORDER — MECOBALAMIN 5000 MCG
5 TABLET,DISINTEGRATING ORAL EVERY EVENING
Status: DISCONTINUED | OUTPATIENT
Start: 2024-11-12 | End: 2024-11-13 | Stop reason: HOSPADM

## 2024-11-12 RX ORDER — INSULIN LISPRO 100 [IU]/ML
0-4 INJECTION, SOLUTION INTRAVENOUS; SUBCUTANEOUS
Status: DISCONTINUED | OUTPATIENT
Start: 2024-11-12 | End: 2024-11-13 | Stop reason: HOSPADM

## 2024-11-12 RX ORDER — SODIUM CHLORIDE 0.9 % (FLUSH) 0.9 %
5-40 SYRINGE (ML) INJECTION EVERY 12 HOURS SCHEDULED
Status: DISCONTINUED | OUTPATIENT
Start: 2024-11-12 | End: 2024-11-13 | Stop reason: HOSPADM

## 2024-11-12 RX ORDER — HYDRALAZINE HYDROCHLORIDE 50 MG/1
50 TABLET, FILM COATED ORAL 3 TIMES DAILY
Status: DISCONTINUED | OUTPATIENT
Start: 2024-11-12 | End: 2024-11-13 | Stop reason: HOSPADM

## 2024-11-12 RX ORDER — ACETAMINOPHEN 325 MG/1
650 TABLET ORAL EVERY 6 HOURS PRN
Status: DISCONTINUED | OUTPATIENT
Start: 2024-11-12 | End: 2024-11-13 | Stop reason: HOSPADM

## 2024-11-12 RX ORDER — MECOBALAMIN 5000 MCG
5 TABLET,DISINTEGRATING ORAL NIGHTLY PRN
Status: DISCONTINUED | OUTPATIENT
Start: 2024-11-12 | End: 2024-11-13 | Stop reason: HOSPADM

## 2024-11-12 RX ORDER — CARVEDILOL 6.25 MG/1
3.12 TABLET ORAL 2 TIMES DAILY WITH MEALS
Status: DISCONTINUED | OUTPATIENT
Start: 2024-11-12 | End: 2024-11-12

## 2024-11-12 RX ORDER — HYDRALAZINE HYDROCHLORIDE 20 MG/ML
10 INJECTION INTRAMUSCULAR; INTRAVENOUS EVERY 6 HOURS PRN
Status: DISCONTINUED | OUTPATIENT
Start: 2024-11-12 | End: 2024-11-13 | Stop reason: HOSPADM

## 2024-11-12 RX ORDER — ASPIRIN 81 MG/1
81 TABLET ORAL DAILY
Status: DISCONTINUED | OUTPATIENT
Start: 2024-11-13 | End: 2024-11-13 | Stop reason: HOSPADM

## 2024-11-12 RX ORDER — BUDESONIDE AND FORMOTEROL FUMARATE DIHYDRATE 160; 4.5 UG/1; UG/1
2 AEROSOL RESPIRATORY (INHALATION) 2 TIMES DAILY
COMMUNITY

## 2024-11-12 RX ORDER — MINOXIDIL 2.5 MG/1
2.5 TABLET ORAL 2 TIMES DAILY
Status: DISCONTINUED | OUTPATIENT
Start: 2024-11-12 | End: 2024-11-13 | Stop reason: HOSPADM

## 2024-11-12 RX ORDER — ISOSORBIDE MONONITRATE 30 MG/1
60 TABLET, EXTENDED RELEASE ORAL DAILY
Status: DISCONTINUED | OUTPATIENT
Start: 2024-11-13 | End: 2024-11-13 | Stop reason: HOSPADM

## 2024-11-12 RX ORDER — AZELASTINE HYDROCHLORIDE 137 UG/1
2 SPRAY, METERED NASAL 2 TIMES DAILY
Status: DISCONTINUED | OUTPATIENT
Start: 2024-11-12 | End: 2024-11-12

## 2024-11-12 RX ORDER — FLUTICASONE PROPIONATE 50 MCG
2 SPRAY, SUSPENSION (ML) NASAL DAILY
Status: DISCONTINUED | OUTPATIENT
Start: 2024-11-12 | End: 2024-11-12

## 2024-11-12 RX ORDER — BUMETANIDE 1 MG/1
1 TABLET ORAL DAILY PRN
Status: DISCONTINUED | OUTPATIENT
Start: 2024-11-12 | End: 2024-11-12

## 2024-11-12 RX ADMIN — SACUBITRIL AND VALSARTAN 1 TABLET: 49; 51 TABLET, FILM COATED ORAL at 21:31

## 2024-11-12 RX ADMIN — DOCUSATE SODIUM 100 MG: 100 CAPSULE, LIQUID FILLED ORAL at 21:32

## 2024-11-12 RX ADMIN — CETIRIZINE HYDROCHLORIDE 10 MG: 10 TABLET, FILM COATED ORAL at 18:34

## 2024-11-12 RX ADMIN — ACETAMINOPHEN 650 MG: 325 TABLET ORAL at 21:31

## 2024-11-12 RX ADMIN — BENZONATATE 100 MG: 100 CAPSULE ORAL at 21:32

## 2024-11-12 RX ADMIN — DULOXETINE HYDROCHLORIDE 60 MG: 60 CAPSULE, DELAYED RELEASE ORAL at 18:34

## 2024-11-12 RX ADMIN — HYDRALAZINE HYDROCHLORIDE 50 MG: 25 TABLET ORAL at 17:56

## 2024-11-12 RX ADMIN — BUMETANIDE 1 MG: 1 TABLET ORAL at 17:54

## 2024-11-12 RX ADMIN — OXYBUTYNIN CHLORIDE 15 MG: 10 TABLET, EXTENDED RELEASE ORAL at 18:34

## 2024-11-12 RX ADMIN — PROCHLORPERAZINE EDISYLATE 5 MG: 5 INJECTION INTRAMUSCULAR; INTRAVENOUS at 14:18

## 2024-11-12 RX ADMIN — ENOXAPARIN SODIUM 40 MG: 100 INJECTION SUBCUTANEOUS at 17:54

## 2024-11-12 RX ADMIN — SODIUM CHLORIDE, PRESERVATIVE FREE 10 ML: 5 INJECTION INTRAVENOUS at 21:33

## 2024-11-12 RX ADMIN — Medication 5 MG: at 21:31

## 2024-11-12 RX ADMIN — HYDRALAZINE HYDROCHLORIDE 50 MG: 25 TABLET ORAL at 21:32

## 2024-11-12 RX ADMIN — TRAZODONE HYDROCHLORIDE 150 MG: 50 TABLET ORAL at 21:31

## 2024-11-12 RX ADMIN — HYDROXYZINE HYDROCHLORIDE 25 MG: 25 TABLET, FILM COATED ORAL at 21:30

## 2024-11-12 ASSESSMENT — PAIN SCALES - GENERAL
PAINLEVEL_OUTOF10: 3
PAINLEVEL_OUTOF10: 0
PAINLEVEL_OUTOF10: 10

## 2024-11-12 ASSESSMENT — PAIN DESCRIPTION - LOCATION
LOCATION: GENERALIZED
LOCATION: BACK;ABDOMEN;LEG

## 2024-11-12 ASSESSMENT — PAIN - FUNCTIONAL ASSESSMENT: PAIN_FUNCTIONAL_ASSESSMENT: 0-10

## 2024-11-12 NOTE — PROGRESS NOTES
Received call about pt from [facility] anesthesiology:  Episodic bradycardia into 20-30s after EGD. Given atropine with recovery.   Longstanding hx cardiac disease, also copd, terri.   No change in sat over course of this.  Egd felt to be normal.   No significant change in mentation.  Pt sent to hospital for further evaluation.

## 2024-11-12 NOTE — H&P
05/16/2023 02:14 PM    SPECGRAV 1.015 03/03/2020 01:30 PM    GLUCOSEU NEGATIVE 11/11/2024 11:27 AM    GLUCOSEU NEGATIVE 04/25/2011 08:10 PM       Imaging:  XR CHEST PORTABLE    Result Date: 11/12/2024  1. Bronchial wall thickening and interstitial coarsening. Some of this is probably chronic or related to subsegmental atelectasis, though there could be a superimposed component of edema, bronchitis or atypical infection. 2. Hazy bibasilar opacities likely atelectasis/scar or edema/inflammatory disease. 3. Enlarged cardiac silhouette.         Assessment and Plan:    Discussed patient's case with ED physician.  Admit to intermediate for obs    Transient bradycardia - RESOLVED  Hx high grade and complete AVB  cRBBB  CAD s/p CABG 2011  HFmEF - EF 50-55%  ILR in place since 5/2024  --Tele monitoring  --C/s EP for evaluation and ILR interrogation  --Hold home coreg for now    Chronic problems: CKD IIIb, COPD, DAYAN not on CPAP, HTN, HLD, DM2, Depression, Obesity, hx CVA, Tobacco and marijuana abuse, PAD  --Home meds reviewed and reordered for IP  --Check lipids, A1c, TSH, B12, folate, iron panel, vit D     Please see orders for further management and care  Plan of care discussed in detail with attending physician.    Additional work up and/or treatment may be added today or thereafter based on patient's clinical progression. Some aspects of care are handled by other specialists. I am managing admission portion of care. Further work up and treatment to be completed by my colleague upon assuming care.        Diet: ADULT DIET; Regular; 4 carb choices (60 gm/meal); Low Fat/Low Chol/High Fiber/2 gm Na  Code Status: Full Code  Surrogate decision maker confirmed with patient:   Extended Emergency Contact Information  Primary Emergency Contact: JoseReidnicolas Abdul  Address: [address] [place] [address] of [place]  Home Phone: [phone]  Mobile Phone: [phone]  Relation: Child

## 2024-11-12 NOTE — ED PROVIDER NOTES
[facility] EMERGENCY DEPARTMENT  EMERGENCY DEPARTMENT ENCOUNTER    Pt Name: Steph Gonzáles  MRN: [id number]  Birthdate 1955  Date of evaluation: 11/12/2024  Provider: Dago Eubanks MD  PCP: Ulises Skinner DO  Note Started: 12:10 PM EST 11/12/24    HPI     Patient is a 68 y.o. female presents with a chief complaint of   Chief Complaint   Patient presents with    Bradycardia     After endoscopy pt went bradycardiac-30s- requiring 2mg atropine and external pacing.    .    Bradycardia.  Patient did have an endoscopy where she was given propofol and became bradycardic into the 30s.  Patient was given 2 mg of atropine.  Patient did not have improvement and patient then needed to get paced.  Heart rate went as low as the low 20s.  Patient currently denies any symptoms.  Denies any fevers, chills, nausea, vomiting, chest pain, changes in urinary or vomits.  Patient does have some mild abdominal pain.    Nursing Notes were all reviewed and agreed with or any disagreements were addressed in the HPI.    History From: Patient    Review of Systems   Pertinent positives and negatives as per HPI.     Physical Exam  Vitals and nursing note reviewed.   Constitutional:       Appearance: She is well-developed.   HENT:      Head: Normocephalic and atraumatic.   Eyes:      Conjunctiva/sclera: Conjunctivae normal.   Cardiovascular:      Rate and Rhythm: Normal rate and regular rhythm.      Heart sounds: Normal heart sounds. No murmur heard.  Pulmonary:      Effort: Pulmonary effort is normal. No respiratory distress.      Breath sounds: Normal breath sounds. No wheezing or rales.   Abdominal:      General: Bowel sounds are normal.      Palpations: Abdomen is soft.      Tenderness: There is no abdominal tenderness. There is no guarding or rebound.   Musculoskeletal:      Cervical back: Normal range of motion and neck supple.   Skin:     General: Skin is warm and dry.   Neurological:

## 2024-11-13 VITALS
SYSTOLIC BLOOD PRESSURE: 130 MMHG | TEMPERATURE: 97.7 F | DIASTOLIC BLOOD PRESSURE: 69 MMHG | HEIGHT: 62 IN | BODY MASS INDEX: 32.76 KG/M2 | OXYGEN SATURATION: 96 % | HEART RATE: 91 BPM | WEIGHT: 178 LBS | RESPIRATION RATE: 20 BRPM

## 2024-11-13 PROBLEM — Z95.818 STATUS POST PLACEMENT OF IMPLANTABLE LOOP RECORDER: Status: ACTIVE | Noted: 2024-11-13

## 2024-11-13 LAB
ANION GAP SERPL CALCULATED.3IONS-SCNC: 10 MMOL/L (ref 7–16)
BUN SERPL-MCNC: 22 MG/DL (ref 6–23)
CALCIUM SERPL-MCNC: 8.3 MG/DL (ref 8.6–10.2)
CHLORIDE SERPL-SCNC: 103 MMOL/L (ref 98–107)
CO2 SERPL-SCNC: 25 MMOL/L (ref 22–29)
CREAT SERPL-MCNC: 1.4 MG/DL (ref 0.5–1)
ERYTHROCYTE [DISTWIDTH] IN BLOOD BY AUTOMATED COUNT: 12.7 % (ref 11.5–15)
GFR, ESTIMATED: 43 ML/MIN/1.73M2
GLUCOSE BLD-MCNC: 112 MG/DL (ref 74–99)
GLUCOSE BLD-MCNC: 115 MG/DL (ref 74–99)
GLUCOSE SERPL-MCNC: 103 MG/DL (ref 74–99)
HCT VFR BLD AUTO: 36.7 % (ref 34–48)
HGB BLD-MCNC: 11.6 G/DL (ref 11.5–15.5)
MCH RBC QN AUTO: 31.6 PG (ref 26–35)
MCHC RBC AUTO-ENTMCNC: 31.6 G/DL (ref 32–34.5)
MCV RBC AUTO: 100 FL (ref 80–99.9)
PLATELET # BLD AUTO: 231 K/UL (ref 130–450)
PMV BLD AUTO: 10.3 FL (ref 7–12)
POTASSIUM SERPL-SCNC: 4 MMOL/L (ref 3.5–5)
RBC # BLD AUTO: 3.67 M/UL (ref 3.5–5.5)
SODIUM SERPL-SCNC: 138 MMOL/L (ref 132–146)
WBC OTHER # BLD: 6.7 K/UL (ref 4.5–11.5)

## 2024-11-13 PROCEDURE — G0378 HOSPITAL OBSERVATION PER HR: HCPCS

## 2024-11-13 PROCEDURE — 85027 COMPLETE CBC AUTOMATED: CPT

## 2024-11-13 PROCEDURE — 6370000000 HC RX 637 (ALT 250 FOR IP): Performed by: INTERNAL MEDICINE

## 2024-11-13 PROCEDURE — 2580000003 HC RX 258: Performed by: NURSE PRACTITIONER

## 2024-11-13 PROCEDURE — 6370000000 HC RX 637 (ALT 250 FOR IP): Performed by: NURSE PRACTITIONER

## 2024-11-13 PROCEDURE — 36415 COLL VENOUS BLD VENIPUNCTURE: CPT

## 2024-11-13 PROCEDURE — 94660 CPAP INITIATION&MGMT: CPT

## 2024-11-13 PROCEDURE — 82962 GLUCOSE BLOOD TEST: CPT

## 2024-11-13 PROCEDURE — 6360000002 HC RX W HCPCS: Performed by: NURSE PRACTITIONER

## 2024-11-13 PROCEDURE — 99239 HOSP IP/OBS DSCHRG MGMT >30: CPT | Performed by: INTERNAL MEDICINE

## 2024-11-13 PROCEDURE — 6370000000 HC RX 637 (ALT 250 FOR IP)

## 2024-11-13 PROCEDURE — 80048 BASIC METABOLIC PNL TOTAL CA: CPT

## 2024-11-13 PROCEDURE — 96372 THER/PROPH/DIAG INJ SC/IM: CPT

## 2024-11-13 RX ORDER — LANOLIN ALCOHOL/MO/W.PET/CERES
400 CREAM (GRAM) TOPICAL ONCE
Status: COMPLETED | OUTPATIENT
Start: 2024-11-13 | End: 2024-11-13

## 2024-11-13 RX ORDER — CYCLOBENZAPRINE HCL 10 MG
10 TABLET ORAL 2 TIMES DAILY PRN
Qty: 10 TABLET | Refills: 0 | Status: SHIPPED | OUTPATIENT
Start: 2024-11-13 | End: 2024-11-23

## 2024-11-13 RX ORDER — CYCLOBENZAPRINE HCL 10 MG
10 TABLET ORAL ONCE
Status: COMPLETED | OUTPATIENT
Start: 2024-11-13 | End: 2024-11-13

## 2024-11-13 RX ORDER — CYCLOBENZAPRINE HCL 10 MG
10 TABLET ORAL 2 TIMES DAILY PRN
Status: DISCONTINUED | OUTPATIENT
Start: 2024-11-13 | End: 2024-11-13 | Stop reason: HOSPADM

## 2024-11-13 RX ORDER — NAPROXEN 250 MG/1
250 TABLET ORAL 2 TIMES DAILY PRN
Qty: 60 TABLET | Refills: 0 | Status: SHIPPED | OUTPATIENT
Start: 2024-11-13

## 2024-11-13 RX ORDER — BENZONATATE 100 MG/1
100 CAPSULE ORAL 3 TIMES DAILY PRN
Qty: 30 CAPSULE | Refills: 0 | Status: SHIPPED | OUTPATIENT
Start: 2024-11-13 | End: 2024-11-20

## 2024-11-13 RX ADMIN — DULOXETINE HYDROCHLORIDE 60 MG: 60 CAPSULE, DELAYED RELEASE ORAL at 08:33

## 2024-11-13 RX ADMIN — CAMPHOR AND MENTHOL: .6; .6 LOTION TOPICAL at 08:35

## 2024-11-13 RX ADMIN — CETIRIZINE HYDROCHLORIDE 10 MG: 10 TABLET, FILM COATED ORAL at 08:33

## 2024-11-13 RX ADMIN — SACUBITRIL AND VALSARTAN 1 TABLET: 49; 51 TABLET, FILM COATED ORAL at 08:35

## 2024-11-13 RX ADMIN — ACETAMINOPHEN 650 MG: 325 TABLET ORAL at 13:08

## 2024-11-13 RX ADMIN — ACETAMINOPHEN 650 MG: 325 TABLET ORAL at 06:20

## 2024-11-13 RX ADMIN — MINOXIDIL 2.5 MG: 2.5 TABLET ORAL at 08:32

## 2024-11-13 RX ADMIN — ASPIRIN 81 MG: 81 TABLET, COATED ORAL at 08:39

## 2024-11-13 RX ADMIN — ISOSORBIDE MONONITRATE 60 MG: 30 TABLET, EXTENDED RELEASE ORAL at 08:33

## 2024-11-13 RX ADMIN — BUMETANIDE 1 MG: 1 TABLET ORAL at 08:33

## 2024-11-13 RX ADMIN — Medication 400 MG: at 09:01

## 2024-11-13 RX ADMIN — HYDRALAZINE HYDROCHLORIDE 50 MG: 25 TABLET ORAL at 13:08

## 2024-11-13 RX ADMIN — HYDRALAZINE HYDROCHLORIDE 50 MG: 25 TABLET ORAL at 08:33

## 2024-11-13 RX ADMIN — ENOXAPARIN SODIUM 40 MG: 100 INJECTION SUBCUTANEOUS at 08:36

## 2024-11-13 RX ADMIN — SODIUM CHLORIDE, PRESERVATIVE FREE 10 ML: 5 INJECTION INTRAVENOUS at 08:36

## 2024-11-13 RX ADMIN — CYCLOBENZAPRINE 10 MG: 10 TABLET, FILM COATED ORAL at 11:34

## 2024-11-13 RX ADMIN — PANTOPRAZOLE SODIUM 40 MG: 40 TABLET, DELAYED RELEASE ORAL at 05:46

## 2024-11-13 RX ADMIN — CYCLOBENZAPRINE 10 MG: 10 TABLET, FILM COATED ORAL at 01:29

## 2024-11-13 RX ADMIN — BENZOCAINE AND MENTHOL 1 LOZENGE: 15; 3.6 LOZENGE ORAL at 08:33

## 2024-11-13 RX ADMIN — OXYBUTYNIN CHLORIDE 15 MG: 10 TABLET, EXTENDED RELEASE ORAL at 08:32

## 2024-11-13 ASSESSMENT — PAIN - FUNCTIONAL ASSESSMENT
PAIN_FUNCTIONAL_ASSESSMENT: ACTIVITIES ARE NOT PREVENTED

## 2024-11-13 ASSESSMENT — PAIN SCALES - GENERAL
PAINLEVEL_OUTOF10: 7
PAINLEVEL_OUTOF10: 9
PAINLEVEL_OUTOF10: 3
PAINLEVEL_OUTOF10: 10
PAINLEVEL_OUTOF10: 6
PAINLEVEL_OUTOF10: 7
PAINLEVEL_OUTOF10: 4

## 2024-11-13 ASSESSMENT — PAIN DESCRIPTION - DESCRIPTORS
DESCRIPTORS: ACHING;DISCOMFORT;SORE

## 2024-11-13 ASSESSMENT — PAIN DESCRIPTION - LOCATION
LOCATION: LEG
LOCATION: HEAD
LOCATION: THROAT

## 2024-11-13 ASSESSMENT — PAIN DESCRIPTION - ORIENTATION
ORIENTATION: MID
ORIENTATION: RIGHT;LEFT
ORIENTATION: RIGHT;LEFT;MID

## 2024-11-13 NOTE — CONSULTS
[facility]  Consultation Report  PATIENT: Steph Gonzáles  MEDICAL RECORD NUMBER: [id number]  DATE OF SERVICE:  11/13/2024  ATTENDING ELECTROPHYSIOLOGIST: García Ocasio MD   PRIMARY ELECTROPHYSIOLOGIST: García Ocasio MD   REFERRING PHYSICIAN: No ref. provider found and Ulises Skinner DO  CHIEF COMPLAINT: Bradycardia    HPI: This is a 68 y.o. female with a history of coronary artery disease with prior CABG in 2011 (LIMA to LAD, SVG to OM1), chronic heart failure with improved LVEF, chronic RBBB, resistant hypertension, hyperlipidemia, diabetes mellitus with neuropathy, CKD, CVA, COPD/asthma, obstructive sleep apnea, obesity (BMI 30 to 0.56 kg/m²), hepatic encephalopathy, chronic back pain, hiatal hernia, fatty liver disease, tobacco abuse, marijuana abuse, mood disorder, previously open wounds on her right lower extremity.  She is known to Dr. Ocasio and has ILR for bradycardia, high-grade AV block in the setting of Coreg, clonidine and untreated DAYAN. She was diagnosed with cardiomyopathy in January 2017 when she was admitted with decompensated heart failure and TTE showed  LV EF of 35-45%. She was treated with GDMT and repeat echocardiogram in June of 2018 showed improved LVEF of 65% . She presented to the ED on 2/14/24 with SAUNDERS and in the ED she was noted to have an 8.67 second pause at 4:49 AM as well as episodes of second degree AV block Mobitz type II. Due to being asymptomatic and occurred while sleeping she was scheduled to be discharged home on a 14 day monitor which did not completed. She was advised to hold Beta blockers and Clonidine. However after she was seen by Nephrology her Clonidine was increased up to 0.3mg TID and Coreg 12.5mg BID. The  MCOT was finally placed by her PCP which completed on 5/8/24 and showed  11 pauses, most pauses occurring during nocturnal hours with longest pause of 9.7 seconds except for

## 2024-11-13 NOTE — PROGRESS NOTES
4 Eyes Skin Assessment     NAME:  Steph Gonzáles  YOB: 1955  MEDICAL RECORD NUMBER:  [id number]    The patient is being assessed for  Admission    I agree that at least one RN has performed a thorough Head to Toe Skin Assessment on the patient. ALL assessment sites listed below have been assessed.      Areas assessed by both nurses:    Head, Face, Ears, Shoulders, Back, Chest, Arms, Elbows, Hands, Sacrum. Buttock, Coccyx, Ischium, Legs. Feet and Heels, and Under Medical Devices         Does the Patient have a Wound? No noted wound(s)       Jadon Prevention initiated by RN: Yes  Wound Care Orders initiated by RN: No    Pressure Injury (Stage 3,4, Unstageable, DTI, NWPT, and Complex wounds) if present, place Wound referral order by RN under : No    New Ostomies, if present place, Ostomy referral order under : No     Nurse 1 eSignature: Electronically signed by Madonna Antonio RN on 11/12/24 at 11:10 PM EST    **SHARE this note so that the co-signing nurse can place an eSignature**    Nurse 2 eSignature: Electronically signed by Deepa Guardado RN on 11/12/24 at 11:12 PM EST

## 2024-11-13 NOTE — DISCHARGE INSTR - COC
support    Rehab Therapies: {THERAPEUTIC INTERVENTION:1449692433}  Weight Bearing Status/Restrictions: { CC Weight Bearin}  Other Medical Equipment (for information only, NOT a DME order):  {EQUIPMENT:921488292}  Other Treatments: ***    Patient's personal belongings (please select all that are sent with patient):  {CHP DME Belongings:061519265}    RN SIGNATURE:  {Esignature:207575391}    CASE MANAGEMENT/SOCIAL WORK SECTION    Inpatient Status Date: ***    Readmission Risk Assessment Score:  Readmission Risk              Risk of Unplanned Readmission:  0           Discharging to Facility/ Agency   Name:   Address:  Phone:  Fax:    Dialysis Facility (if applicable)   Name:  Address:  Dialysis Schedule:  Phone:  Fax:    / signature: {Esignature:369878426}    PHYSICIAN SECTION    Prognosis: {Prognosis:9006631453}    Condition at Discharge: { Patient Condition:606380429}    Rehab Potential (if transferring to Rehab): {Prognosis:5207442226}    Recommended Labs or Other Treatments After Discharge: ***    Physician Certification: I certify the above information and transfer of Steph Gonzáles  is necessary for the continuing treatment of the diagnosis listed and that she requires {Admit to Appropriate Level of Care:89405} for {GREATER/LESS:274820677} 30 days.     Update Admission H&P: {CHP DME Changes in HandP:521694839}    PHYSICIAN SIGNATURE:  {Esignature:778547000}

## 2024-11-13 NOTE — DISCHARGE SUMMARY
known as: DITROPAN XL     simvastatin 20 MG tablet  Commonly known as: ZOCOR  Take 1 tablet by mouth nightly     Symbicort 160-4.5 MCG/ACT Aero  Generic drug: budesonide-formoterol     therapeutic multivitamin-minerals tablet     traZODone 150 MG tablet  Commonly known as: DESYREL  Take 1 tablet by mouth nightly     Tresiba FlexTouch 200 UNIT/ML Sopn  Generic drug: Insulin Degludec  Inject 5 Units into the skin at bedtime     vitamin D 50 MCG (2000 UT) Caps capsule  Commonly known as: CHOLECALCIFEROL  Take 1 capsule by mouth daily               Where to Get Your Medications        These medications were sent to [facility]/pharmacy #[id number] - [place], [place] - [address] - P [phone] - F [phone]  [address] [place] [address] [place] [address]      Phone: [phone]   benzonatate 100 MG capsule  cyclobenzaprine 10 MG tablet  naproxen 250 MG tablet           Note that more than 30 minutes was spent in preparing discharge papers, discussing discharge with patient, medication review, etc.    Signed:  Electronically signed by María Reilly MD on 11/13/2024 at 11:45 AM

## 2024-11-13 NOTE — CARE COORDINATION
11/13/24 Discharge order noted Met with pt to discuss transition of care and discharge preferences Pt lives with a friend in an one story home with ramp entry Pt has cane and rollator for ambulation Pt has aides 7 days a week 5 hours per day on the weekday 8 hours a day on weekend Pt has O2 at home which she wears only when she lays down through Medical Services Pt has had HHC [facility] and [facility] at [facility] [place] Plan is home with no needs expressed for discharge Daughter will transport Electronically signed by Ruperto Wise RN on 11/13/2024 at 1:35 PM     Case Management Assessment  Initial Evaluation    Date/Time of Evaluation: 11/13/2024 1:35 PM  Assessment Completed by: Ruperto Wise RN    If patient is discharged prior to next notation, then this note serves as note for discharge by case management.    Patient Name: Steph Gonzáles                   YOB: 1955  Diagnosis: Bradycardia [R00.1]  Bradycardia following surgery [I97.89]                   Date / Time: 11/12/2024 11:45 AM    Patient Admission Status: Observation   Readmission Risk (Low < 19, Mod (19-27), High > 27): Readmission Risk Score: 21    Current PCP: Ulises Skinner, DO  PCP verified by CM?      Chart Reviewed: Yes      History Provided by:    Patient Orientation:      Patient Cognition:      Hospitalization in the last 30 days (Readmission):  No    If yes, Readmission Assessment in CM Navigator will be completed.    Advance Directives:      Code Status: Full Code   Patient's Primary Decision Maker is:      Primary Decision Maker: Rachel Garcia Poa - Child - [phone]    Secondary Decision Maker: Fatmata Conrad - Brother/Sister - [phone]    Discharge Planning:    Patient lives with: Alone Type of Home: House  Primary Care Giver:    Patient Support Systems include: Family Members   Current Financial resources:    Current community resources:    Current services prior to admission: None

## 2024-11-14 ENCOUNTER — TELEPHONE (OUTPATIENT)
Dept: FAMILY MEDICINE CLINIC | Age: 69
End: 2024-11-14

## 2024-11-14 NOTE — TELEPHONE ENCOUNTER
Care Transitions Initial Follow Up Call    Outreach made within 2 business days of discharge: Yes    Patient: Steph Gonzáles Patient : 1955   MRN: [id number]  Reason for Admission: Bradycardia  Discharge Date: 24       Spoke with: LEFT MESSAGE TO RETURN CALL    Discharge department/facility: [facility]      Follow Up  Future Appointments   Date Time Provider Department Center   2024 11:00 AM [facility] VASCULAR LAB RM 1 [facility] VASC SE[facility] Radiolog   2024 11:00 AM Ulises Skinner DO [place]  BS ECC DEP   2025  1:00 PM SCHEDULE, DEVICE CLINIC 1 [facility] ELECTRO PHYS [facility]   2025  1:00 PM Ghazal Márquez APRN - CNP ELECTRO PHYS [facility]   2025  9:00 AM Danielle Morrison APRN - NP BDM ENDO [facility]   2025 12:00 PM [facility] CHF ROOM 2 [facility]   2025  1:45 PM Pino Hernandez MD VASC/MED [facility]       Erum Fernandez MA

## 2024-11-16 LAB
EKG ATRIAL RATE: 91 BPM
EKG P AXIS: 58 DEGREES
EKG P-R INTERVAL: 210 MS
EKG Q-T INTERVAL: 418 MS
EKG QRS DURATION: 132 MS
EKG QTC CALCULATION (BAZETT): 514 MS
EKG R AXIS: 10 DEGREES
EKG T AXIS: 99 DEGREES
EKG VENTRICULAR RATE: 91 BPM

## 2024-11-18 LAB — SURGICAL PATHOLOGY REPORT: NORMAL

## 2024-11-20 ENCOUNTER — OFFICE VISIT (OUTPATIENT)
Dept: FAMILY MEDICINE CLINIC | Age: 69
End: 2024-11-20

## 2024-11-20 VITALS
SYSTOLIC BLOOD PRESSURE: 129 MMHG | BODY MASS INDEX: 33.86 KG/M2 | WEIGHT: 184 LBS | OXYGEN SATURATION: 98 % | HEART RATE: 116 BPM | TEMPERATURE: 98 F | HEIGHT: 62 IN | RESPIRATION RATE: 20 BRPM | DIASTOLIC BLOOD PRESSURE: 74 MMHG

## 2024-11-20 DIAGNOSIS — R22.1 LOCALIZED SWELLING, MASS AND LUMP, NECK: ICD-10-CM

## 2024-11-20 DIAGNOSIS — R19.7 DIARRHEA, UNSPECIFIED TYPE: ICD-10-CM

## 2024-11-20 DIAGNOSIS — R41.3 MEMORY LOSS: ICD-10-CM

## 2024-11-20 DIAGNOSIS — R11.2 NAUSEA AND VOMITING, UNSPECIFIED VOMITING TYPE: ICD-10-CM

## 2024-11-20 DIAGNOSIS — K29.50 OTHER CHRONIC GASTRITIS WITHOUT HEMORRHAGE: ICD-10-CM

## 2024-11-20 DIAGNOSIS — Z00.00 MEDICARE ANNUAL WELLNESS VISIT, SUBSEQUENT: Primary | ICD-10-CM

## 2024-11-20 DIAGNOSIS — I10 PRIMARY HYPERTENSION: ICD-10-CM

## 2024-11-20 DIAGNOSIS — I97.89 BRADYCARDIA FOLLOWING SURGERY: ICD-10-CM

## 2024-11-20 PROBLEM — I25.10 ARTERIOSCLEROSIS OF CORONARY ARTERY: Status: ACTIVE | Noted: 2017-08-09

## 2024-11-20 PROBLEM — I12.9 BENIGN HYPERTENSIVE KIDNEY DISEASE WITH CHRONIC KIDNEY DISEASE: Status: ACTIVE | Noted: 2022-04-22

## 2024-11-20 PROBLEM — K74.60 CIRRHOSIS OF LIVER NOT DUE TO ALCOHOL (HCC): Status: ACTIVE | Noted: 2017-02-06

## 2024-11-20 PROBLEM — M79.604 PAIN IN RIGHT LEG: Status: RESOLVED | Noted: 2024-11-07 | Resolved: 2024-11-20

## 2024-11-20 PROBLEM — I89.0 ACQUIRED LYMPHEDEMA: Status: ACTIVE | Noted: 2017-02-23

## 2024-11-20 PROBLEM — K29.70 GASTRITIS: Status: ACTIVE | Noted: 2024-11-20

## 2024-11-20 PROBLEM — Z96.652 HISTORY OF ARTHROPLASTY OF LEFT KNEE: Status: ACTIVE | Noted: 2019-07-01

## 2024-11-20 PROBLEM — E11.42 DIABETIC PERIPHERAL NEUROPATHY (HCC): Status: ACTIVE | Noted: 2022-11-28

## 2024-11-20 PROBLEM — I42.9 CARDIOMYOPATHY, IDIOPATHIC (HCC): Status: ACTIVE | Noted: 2017-02-23

## 2024-11-20 PROBLEM — Z95.1 PRESENCE OF AORTOCORONARY BYPASS GRAFT: Status: ACTIVE | Noted: 2017-08-09

## 2024-11-20 ASSESSMENT — PATIENT HEALTH QUESTIONNAIRE - PHQ9
10. IF YOU CHECKED OFF ANY PROBLEMS, HOW DIFFICULT HAVE THESE PROBLEMS MADE IT FOR YOU TO DO YOUR WORK, TAKE CARE OF THINGS AT HOME, OR GET ALONG WITH OTHER PEOPLE: SOMEWHAT DIFFICULT
SUM OF ALL RESPONSES TO PHQ QUESTIONS 1-9: 9
2. FEELING DOWN, DEPRESSED OR HOPELESS: SEVERAL DAYS
SUM OF ALL RESPONSES TO PHQ QUESTIONS 1-9: 9
SUM OF ALL RESPONSES TO PHQ QUESTIONS 1-9: 1
SUM OF ALL RESPONSES TO PHQ QUESTIONS 1-9: 9
SUM OF ALL RESPONSES TO PHQ QUESTIONS 1-9: 1
1. LITTLE INTEREST OR PLEASURE IN DOING THINGS: SEVERAL DAYS
SUM OF ALL RESPONSES TO PHQ QUESTIONS 1-9: 8
7. TROUBLE CONCENTRATING ON THINGS, SUCH AS READING THE NEWSPAPER OR WATCHING TELEVISION: SEVERAL DAYS
9. THOUGHTS THAT YOU WOULD BE BETTER OFF DEAD, OR OF HURTING YOURSELF: SEVERAL DAYS
SUM OF ALL RESPONSES TO PHQ QUESTIONS 1-9: 1
4. FEELING TIRED OR HAVING LITTLE ENERGY: SEVERAL DAYS
8. MOVING OR SPEAKING SO SLOWLY THAT OTHER PEOPLE COULD HAVE NOTICED. OR THE OPPOSITE, BEING SO FIGETY OR RESTLESS THAT YOU HAVE BEEN MOVING AROUND A LOT MORE THAN USUAL: SEVERAL DAYS
1. LITTLE INTEREST OR PLEASURE IN DOING THINGS: SEVERAL DAYS
SUM OF ALL RESPONSES TO PHQ QUESTIONS 1-9: 1
SUM OF ALL RESPONSES TO PHQ9 QUESTIONS 1 & 2: 2
5. POOR APPETITE OR OVEREATING: SEVERAL DAYS
6. FEELING BAD ABOUT YOURSELF - OR THAT YOU ARE A FAILURE OR HAVE LET YOURSELF OR YOUR FAMILY DOWN: SEVERAL DAYS
3. TROUBLE FALLING OR STAYING ASLEEP: SEVERAL DAYS

## 2024-11-20 ASSESSMENT — COLUMBIA-SUICIDE SEVERITY RATING SCALE - C-SSRS
2. HAVE YOU ACTUALLY HAD ANY THOUGHTS OF KILLING YOURSELF?: NO
1. WITHIN THE PAST MONTH, HAVE YOU WISHED YOU WERE DEAD OR WISHED YOU COULD GO TO SLEEP AND NOT WAKE UP?: NO
6. HAVE YOU EVER DONE ANYTHING, STARTED TO DO ANYTHING, OR PREPARED TO DO ANYTHING TO END YOUR LIFE?: NO

## 2024-11-20 NOTE — PATIENT INSTRUCTIONS
reviewing your medical record and screening and assessments performed today your provider may have ordered immunizations, labs, imaging, and/or referrals for you.  A list of these orders (if applicable) as well as your Preventive Care list are included within your After Visit Summary for your review.    Other Preventive Recommendations:    A preventive eye exam performed by an eye specialist is recommended every 1-2 years to screen for glaucoma; cataracts, macular degeneration, and other eye disorders.  A preventive dental visit is recommended every 6 months.  Try to get at least 150 minutes of exercise per week or 10,000 steps per day on a pedometer .  Order or download the FREE \"Exercise & Physical Activity: Your Everyday Guide\" from [employer]. Call 1-200.421.6420 or search [employer] online.  You need 5445-2233 mg of calcium and 6408-5008 IU of vitamin D per day. It is possible to meet your calcium requirement with diet alone, but a vitamin D supplement is usually necessary to meet this goal.  When exposed to the sun, use a sunscreen that protects against both UVA and UVB radiation with an SPF of 30 or greater. Reapply every 2 to 3 hours or after sweating, drying off with a towel, or swimming.  Always wear a seat belt when traveling in a car. Always wear a helmet when riding a bicycle or motorcycle.

## 2024-11-20 NOTE — PROGRESS NOTES
Medicare Annual Wellness Visit    Steph Gonzáles is here for Medicare AWV, recent hospital stay , and Follow Up After Procedure    Assessment & Plan   Medicare annual wellness visit, subsequent    Recommendations for Preventive Services Due: see orders and patient instructions/AVS.  Recommended screening schedule for the next 5-10 years is provided to the patient in written form: see Patient Instructions/AVS.     Return for Medicare Annual Wellness Visit in 1 year.     Subjective       Patient's complete Health Risk Assessment and screening values have been reviewed and are found in Flowsheets. The following problems were reviewed today and where indicated follow up appointments were made and/or referrals ordered.    Positive Risk Factor Screenings with Interventions:      Depression:  PHQ-2 Score: 2  PHQ-9 Total Score: 9  Total Score Interpretation: 5-9 = mild depression  Interventions:  Patient advised to follow-up in this office for further evaluation and treatment. \"I'm not depressed but I just don't feel well.\"     Drug Use:   Substance and Sexual Activity   Drug Use Yes    Types: Marijuana (Weed)    Comment: 4 times per week     Interventions:  Patient declined any further intervention or treatment       Self-assessment of health:  In general, how would you say your health is?: (!) Poor    Interventions:  Patient advised to follow-up in this office for further evaluation and treatment    General HRA Questions:  Select all that apply: (!) New or Increased Pain, New or Increased Fatigue, Stress  Interventions - Pain:  Patient comments: considering back pain radiculopathy  Interventions Fatigue:  Patient advised to follow up in the office for further evaluation and treatment  Interventions - Stress:  Patient advised to follow up in the office for further evaluation and treatment      Inactivity:  On average, how many days per week do you engage in moderate to strenuous exercise (like a brisk walk)?: 0 days (!)  found on  colonoscopy   -CT a/p as above : initial report shows colon mass but negative mass on colonoscopy result  -pending pathology   -tolerating advanced diet.  -heme/onc dr. Corona following`  -Dr Gifford following

## 2024-11-20 NOTE — PROGRESS NOTES
GI - n/v/diarrhea - Gastritis with metaplasia on biopsy. Ct abd done prior, no specific etiology to n/v. Taking linzess. Taking ppi. Fu GI, get US neck.  [place] after procedure - has loop recorder - tachy today. Some dehydration likely. Off carvedilol. Fu with specialist for further evaluation     Prior echo    Left Ventricle: Normal left ventricular systolic function. EF by visual approximation is 55%. Left ventricle size is normal. Moderately increased wall thickness. Severe septal thickening. Mild hypokinesis of the following segments: basal anterolateral, basal inferolateral, mid anterolateral and mid inferolateral.    Memory loss seems more prominent recently. No new neurologic deficit. Will monitor when further out from recent medical problems.     Htn at goal on current meds, no change in plan.   Hemoglobin A1C   Date Value Ref Range Status   11/12/2024 5.6 4.0 - 5.6 % Final         /74   Pulse (!) 116   Temp 98 °F (36.7 °C) (Temporal)   Resp 20   Ht 1.575 m (5' 2\")   Wt 83.5 kg (184 lb)   LMP 01/01/1990   SpO2 98%   BMI 33.65 kg/m²     On this date 11/20/2024 I spent a total of 35 minutes on the day of the visit.    reviewing previous notes, test results and face to face with the patient discussing the diagnosis and importance of compliance with the treatment plan as well as documenting on the day of the visit.

## 2024-11-22 ENCOUNTER — TELEPHONE (OUTPATIENT)
Dept: CARDIOLOGY CLINIC | Age: 69
End: 2024-11-22

## 2024-11-22 NOTE — TELEPHONE ENCOUNTER
Patient needs cardiac clearance for colonoscopy by Dr. Moeller under [facility] (not yet scheduled). Patient seen in our office on 10/24/24.  Recommended to hold anticoagulants (5) days prior.

## 2024-11-22 NOTE — TELEPHONE ENCOUNTER
Patient's revised cardiac risk index is elevated (3), class IV risk, 15% risk of major perioperative cardiac events.  Currently, patient does not have any active cardiac symptoms including chest pain, decompensated heart failure, uncontrolled arrhythmias and uncontrolled arrhythmias.  Patient has chronic dyspnea secondary to underlying COPD.  Continue aspirin 81 mg p.o. daily.  No additional cardiac workup is needed prior to colonoscopy and proceed with the surgery as scheduled.  Currently patient is not on anticoagulation therapy.

## 2024-12-17 RX ORDER — ACETAMINOPHEN 160 MG
TABLET,DISINTEGRATING ORAL DAILY
Qty: 90 CAPSULE | Refills: 0 | Status: SHIPPED | OUTPATIENT
Start: 2024-12-17

## 2024-12-18 ENCOUNTER — OFFICE VISIT (OUTPATIENT)
Dept: FAMILY MEDICINE CLINIC | Age: 69
End: 2024-12-18
Payer: MEDICARE

## 2024-12-18 VITALS
TEMPERATURE: 96.9 F | RESPIRATION RATE: 17 BRPM | OXYGEN SATURATION: 98 % | SYSTOLIC BLOOD PRESSURE: 144 MMHG | HEIGHT: 62 IN | WEIGHT: 188.2 LBS | HEART RATE: 90 BPM | DIASTOLIC BLOOD PRESSURE: 90 MMHG | BODY MASS INDEX: 34.63 KG/M2

## 2024-12-18 DIAGNOSIS — I10 PRIMARY HYPERTENSION: Chronic | ICD-10-CM

## 2024-12-18 DIAGNOSIS — R05.1 ACUTE COUGH: Primary | ICD-10-CM

## 2024-12-18 DIAGNOSIS — N18.30 STAGE 3 CHRONIC KIDNEY DISEASE, UNSPECIFIED WHETHER STAGE 3A OR 3B CKD (HCC): Chronic | ICD-10-CM

## 2024-12-18 DIAGNOSIS — I97.89 BRADYCARDIA FOLLOWING SURGERY: ICD-10-CM

## 2024-12-18 DIAGNOSIS — M62.838 MUSCLE SPASM: ICD-10-CM

## 2024-12-18 PROCEDURE — 1159F MED LIST DOCD IN RCRD: CPT | Performed by: STUDENT IN AN ORGANIZED HEALTH CARE EDUCATION/TRAINING PROGRAM

## 2024-12-18 PROCEDURE — 3017F COLORECTAL CA SCREEN DOC REV: CPT | Performed by: STUDENT IN AN ORGANIZED HEALTH CARE EDUCATION/TRAINING PROGRAM

## 2024-12-18 PROCEDURE — 1123F ACP DISCUSS/DSCN MKR DOCD: CPT | Performed by: STUDENT IN AN ORGANIZED HEALTH CARE EDUCATION/TRAINING PROGRAM

## 2024-12-18 PROCEDURE — 3077F SYST BP >= 140 MM HG: CPT | Performed by: STUDENT IN AN ORGANIZED HEALTH CARE EDUCATION/TRAINING PROGRAM

## 2024-12-18 PROCEDURE — G8484 FLU IMMUNIZE NO ADMIN: HCPCS | Performed by: STUDENT IN AN ORGANIZED HEALTH CARE EDUCATION/TRAINING PROGRAM

## 2024-12-18 PROCEDURE — 4004F PT TOBACCO SCREEN RCVD TLK: CPT | Performed by: STUDENT IN AN ORGANIZED HEALTH CARE EDUCATION/TRAINING PROGRAM

## 2024-12-18 PROCEDURE — 1090F PRES/ABSN URINE INCON ASSESS: CPT | Performed by: STUDENT IN AN ORGANIZED HEALTH CARE EDUCATION/TRAINING PROGRAM

## 2024-12-18 PROCEDURE — G8399 PT W/DXA RESULTS DOCUMENT: HCPCS | Performed by: STUDENT IN AN ORGANIZED HEALTH CARE EDUCATION/TRAINING PROGRAM

## 2024-12-18 PROCEDURE — 99214 OFFICE O/P EST MOD 30 MIN: CPT | Performed by: STUDENT IN AN ORGANIZED HEALTH CARE EDUCATION/TRAINING PROGRAM

## 2024-12-18 PROCEDURE — G8427 DOCREV CUR MEDS BY ELIG CLIN: HCPCS | Performed by: STUDENT IN AN ORGANIZED HEALTH CARE EDUCATION/TRAINING PROGRAM

## 2024-12-18 PROCEDURE — 3080F DIAST BP >= 90 MM HG: CPT | Performed by: STUDENT IN AN ORGANIZED HEALTH CARE EDUCATION/TRAINING PROGRAM

## 2024-12-18 PROCEDURE — G8417 CALC BMI ABV UP PARAM F/U: HCPCS | Performed by: STUDENT IN AN ORGANIZED HEALTH CARE EDUCATION/TRAINING PROGRAM

## 2024-12-18 RX ORDER — NAPROXEN 250 MG/1
250 TABLET ORAL 2 TIMES DAILY PRN
Qty: 60 TABLET | Refills: 0 | Status: CANCELLED | OUTPATIENT
Start: 2024-12-18

## 2024-12-18 RX ORDER — BENZONATATE 100 MG/1
100 CAPSULE ORAL 2 TIMES DAILY PRN
Qty: 14 CAPSULE | Refills: 0 | Status: SHIPPED | OUTPATIENT
Start: 2024-12-18 | End: 2024-12-25

## 2024-12-18 NOTE — PROGRESS NOTES
Patient:  Steph Gonzáles 68 y.o. female     12/18/24      Chiefcomplaint:   Chief Complaint   Patient presents with    Hypertension     Problems and Overview     Copd, cad s/p cabg, pad, dm2, ckd, htn with recent episode cortze after procedure taken off of carvedilol    She has no acute concerns today other than she wants refill of muscle relaxer, naprosyn and tessalon    Other hx  GI - n/v/diarrhea - Gastritis with metaplasia on biopsy. Ct abd done prior, no specific etiology to n/v. Taking linzess. Taking ppi. Following gi  [place] after procedure - has loop recorder. Off carvedilol. Follows cardio,ep.     CAD - cabg hx  Following dr barclay  Going to chf clinic    Following dr gardner for htn, ckd  Lab Results   Component Value Date    CREATININE 1.4 (H) 11/13/2024    CREATININE 1.3 (H) 11/12/2024    CREATININE 1.6 (H) 11/11/2024     Following gerber for pulm    Wt Readings from Last 3 Encounters:   12/18/24 85.4 kg (188 lb 3.2 oz)   11/20/24 83.5 kg (184 lb)   11/12/24 80.7 kg (178 lb)     Htn  BP Readings from Last 3 Encounters:   12/18/24 (!) 144/90   11/20/24 129/74   11/13/24 130/69         Lab Results   Component Value Date    CHOL 126 11/12/2024    TRIG 86 11/12/2024    HDL 46 11/12/2024    LDL 63 11/12/2024    VLDL 17 11/12/2024     Echo 2024    Left Ventricle: Normal left ventricular systolic function. EF by visual approximation is 55%. Left ventricle size is normal. Moderately increased wall thickness. Severe septal thickening. Mild hypokinesis of the following segments: basal anterolateral, basal inferolateral, mid anterolateral and mid inferolateral.    Limited echo ordered.    Patient Active Problem List    Diagnosis Date Noted    Coronary artery disease involving native coronary artery of native heart without angina pectoris 01/09/2017     Priority: High    Chronic combined systolic and diastolic heart failure (HCC) 10/04/2013     Priority: High     Cath Jan 2013-   EF 30%, global LV

## 2024-12-19 ENCOUNTER — TELEPHONE (OUTPATIENT)
Dept: CARDIOLOGY CLINIC | Age: 69
End: 2024-12-19

## 2024-12-19 NOTE — TELEPHONE ENCOUNTER
Patient called stating ever since her Coreg was discontinued (due to issues during surgery) her BP has been rising.     186/90 (87) patient's BP cuff yesterday  197/94 (99) manually today

## 2024-12-20 RX ORDER — HYDRALAZINE HYDROCHLORIDE 100 MG/1
100 TABLET, FILM COATED ORAL 3 TIMES DAILY
COMMUNITY

## 2024-12-20 NOTE — TELEPHONE ENCOUNTER
Patient notified of Dr. Gonzales's recommendation.  Med list amended.  Patient has plenty of 50 mg tablets and 100 mg tablets at home. She will call when she needs a refill.

## 2024-12-29 PROCEDURE — 93298 REM INTERROG DEV EVAL SCRMS: CPT | Performed by: INTERNAL MEDICINE

## 2025-01-03 NOTE — PROGRESS NOTES
or sooner PRN. Encouraged to call the office with any questions or concerns.         Re-education on importance of well controlled HTN (goal BP < 130/80), adequate weight control (goal BMI of < 27), physical activity consisting of moderate cardiopulmonary exercise up to a goal of 250 min/wk, smoking/tobacco abstinence and limited ETOH intake.       I have spent a total of 25 minutes with the patient and the family reviewing the above stated recommendations. And a total of >50% of that time involved face-to-face time providing counseling and or coordination of care with the other providers.      Thank you for allowing me to participate in your patient's care.  Please call me if there are any questions or concerns.        PATRIZIA Stover - CNP  Cardiac Electrophysiology  [facility]  [facility]: [facility]  1:14 PM  1/7/2025

## 2025-01-07 ENCOUNTER — OFFICE VISIT (OUTPATIENT)
Dept: NON INVASIVE DIAGNOSTICS | Age: 70
End: 2025-01-07
Payer: MEDICARE

## 2025-01-07 VITALS
TEMPERATURE: 98.4 F | RESPIRATION RATE: 18 BRPM | SYSTOLIC BLOOD PRESSURE: 110 MMHG | DIASTOLIC BLOOD PRESSURE: 62 MMHG | WEIGHT: 193.2 LBS | HEART RATE: 98 BPM | OXYGEN SATURATION: 91 % | HEIGHT: 62 IN | BODY MASS INDEX: 35.55 KG/M2

## 2025-01-07 DIAGNOSIS — I97.89 BRADYCARDIA FOLLOWING SURGERY: ICD-10-CM

## 2025-01-07 DIAGNOSIS — Z95.818 STATUS POST PLACEMENT OF IMPLANTABLE LOOP RECORDER: ICD-10-CM

## 2025-01-07 DIAGNOSIS — I51.9 HEART PROBLEM: Primary | ICD-10-CM

## 2025-01-07 PROCEDURE — 3078F DIAST BP <80 MM HG: CPT | Performed by: NURSE PRACTITIONER

## 2025-01-07 PROCEDURE — 1159F MED LIST DOCD IN RCRD: CPT | Performed by: NURSE PRACTITIONER

## 2025-01-07 PROCEDURE — 4004F PT TOBACCO SCREEN RCVD TLK: CPT | Performed by: NURSE PRACTITIONER

## 2025-01-07 PROCEDURE — 99214 OFFICE O/P EST MOD 30 MIN: CPT | Performed by: NURSE PRACTITIONER

## 2025-01-07 PROCEDURE — 93285 PRGRMG DEV EVAL SCRMS IP: CPT | Performed by: INTERNAL MEDICINE

## 2025-01-07 PROCEDURE — 3017F COLORECTAL CA SCREEN DOC REV: CPT | Performed by: NURSE PRACTITIONER

## 2025-01-07 PROCEDURE — 1123F ACP DISCUSS/DSCN MKR DOCD: CPT | Performed by: NURSE PRACTITIONER

## 2025-01-07 PROCEDURE — 1090F PRES/ABSN URINE INCON ASSESS: CPT | Performed by: NURSE PRACTITIONER

## 2025-01-07 PROCEDURE — 93000 ELECTROCARDIOGRAM COMPLETE: CPT | Performed by: INTERNAL MEDICINE

## 2025-01-07 PROCEDURE — G8427 DOCREV CUR MEDS BY ELIG CLIN: HCPCS | Performed by: NURSE PRACTITIONER

## 2025-01-07 PROCEDURE — M1308 PR FLU IMMUNIZE NO ADMIN: HCPCS | Performed by: NURSE PRACTITIONER

## 2025-01-07 PROCEDURE — 1160F RVW MEDS BY RX/DR IN RCRD: CPT | Performed by: NURSE PRACTITIONER

## 2025-01-07 PROCEDURE — G8399 PT W/DXA RESULTS DOCUMENT: HCPCS | Performed by: NURSE PRACTITIONER

## 2025-01-07 PROCEDURE — 3074F SYST BP LT 130 MM HG: CPT | Performed by: NURSE PRACTITIONER

## 2025-01-07 PROCEDURE — G8417 CALC BMI ABV UP PARAM F/U: HCPCS | Performed by: NURSE PRACTITIONER

## 2025-01-09 DIAGNOSIS — M62.838 MUSCLE SPASM: Primary | ICD-10-CM

## 2025-01-09 NOTE — TELEPHONE ENCOUNTER
Name of Medication(s) Requested:  Requested Prescriptions     Pending Prescriptions Disp Refills    tiZANidine (ZANAFLEX) 4 MG tablet [Pharmacy Med Name: TIZANIDINE HCL 4 MG TABLET] 90 tablet 1     Sig: TAKE 1 TABLET BY MOUTH NIGHTLY AS NEEDED FOR MUSCLE SPASM       Medication is on current medication list Yes    Dosage and directions were verified? Yes    Quantity verified: 90 day supply     Pharmacy Verified?  Yes    Last Appointment:  12/18/2024    Future appts:  Future Appointments   Date Time Provider Department Center   2/4/2025  9:00 AM Danielle Morrison APRN - NP BDM ENDO [facility]   2/19/2025 11:00 AM Ulises Skinner DO [facility] BS ECC DEP   4/14/2025 12:00 PM [facility] ROOM 2 [facility] Joe HOD   11/13/2025  1:45 PM Pino Hernandez MD VASDANIEL/MED [facility]        (If no appt send self scheduling link. .REFILLAPPT)  Scheduling request sent?     [] Yes  [x] No    Does patient need updated?  [] Yes  [x] No

## 2025-01-28 ENCOUNTER — TELEPHONE (OUTPATIENT)
Dept: FAMILY MEDICINE CLINIC | Age: 70
End: 2025-01-28

## 2025-01-28 DIAGNOSIS — R05.1 ACUTE COUGH: Primary | ICD-10-CM

## 2025-01-28 DIAGNOSIS — K59.04 CHRONIC IDIOPATHIC CONSTIPATION: ICD-10-CM

## 2025-01-28 NOTE — TELEPHONE ENCOUNTER
Pt called she is asking Dr Skinner too send a script  for ducolax soft gels and Tessalon Pearls for her cough send to [facility]

## 2025-01-29 PROCEDURE — 93298 REM INTERROG DEV EVAL SCRMS: CPT | Performed by: INTERNAL MEDICINE

## 2025-01-29 RX ORDER — DOCUSATE SODIUM 100 MG/1
100 CAPSULE, LIQUID FILLED ORAL 2 TIMES DAILY PRN
Qty: 60 CAPSULE | Refills: 0 | Status: SHIPPED | OUTPATIENT
Start: 2025-01-29

## 2025-01-29 RX ORDER — BENZONATATE 200 MG/1
200 CAPSULE ORAL 3 TIMES DAILY PRN
Qty: 30 CAPSULE | Refills: 0 | Status: SHIPPED | OUTPATIENT
Start: 2025-01-29 | End: 2025-02-08

## 2025-02-04 ENCOUNTER — OFFICE VISIT (OUTPATIENT)
Dept: ENDOCRINOLOGY | Age: 70
End: 2025-02-04
Payer: MEDICARE

## 2025-02-04 VITALS
DIASTOLIC BLOOD PRESSURE: 78 MMHG | OXYGEN SATURATION: 89 % | BODY MASS INDEX: 36.07 KG/M2 | WEIGHT: 196 LBS | SYSTOLIC BLOOD PRESSURE: 126 MMHG | HEART RATE: 112 BPM | HEIGHT: 62 IN

## 2025-02-04 DIAGNOSIS — E11.65 POORLY CONTROLLED DIABETES MELLITUS (HCC): Primary | ICD-10-CM

## 2025-02-04 DIAGNOSIS — E11.9 TYPE 2 DIABETES MELLITUS WITHOUT COMPLICATION, WITH LONG-TERM CURRENT USE OF INSULIN (HCC): ICD-10-CM

## 2025-02-04 DIAGNOSIS — Z79.4 TYPE 2 DIABETES MELLITUS WITHOUT COMPLICATION, WITH LONG-TERM CURRENT USE OF INSULIN (HCC): ICD-10-CM

## 2025-02-04 DIAGNOSIS — Z79.4 TYPE 2 DIABETES MELLITUS WITH HYPOGLYCEMIA WITHOUT COMA, WITH LONG-TERM CURRENT USE OF INSULIN (HCC): ICD-10-CM

## 2025-02-04 DIAGNOSIS — E11.649 TYPE 2 DIABETES MELLITUS WITH HYPOGLYCEMIA WITHOUT COMA, WITH LONG-TERM CURRENT USE OF INSULIN (HCC): ICD-10-CM

## 2025-02-04 LAB — HBA1C MFR BLD: 6.1 %

## 2025-02-04 PROCEDURE — 3074F SYST BP LT 130 MM HG: CPT | Performed by: NURSE PRACTITIONER

## 2025-02-04 PROCEDURE — 3044F HG A1C LEVEL LT 7.0%: CPT | Performed by: NURSE PRACTITIONER

## 2025-02-04 PROCEDURE — 1090F PRES/ABSN URINE INCON ASSESS: CPT | Performed by: NURSE PRACTITIONER

## 2025-02-04 PROCEDURE — 99205 OFFICE O/P NEW HI 60 MIN: CPT | Performed by: NURSE PRACTITIONER

## 2025-02-04 PROCEDURE — 83036 HEMOGLOBIN GLYCOSYLATED A1C: CPT | Performed by: NURSE PRACTITIONER

## 2025-02-04 PROCEDURE — G8399 PT W/DXA RESULTS DOCUMENT: HCPCS | Performed by: NURSE PRACTITIONER

## 2025-02-04 PROCEDURE — 1159F MED LIST DOCD IN RCRD: CPT | Performed by: NURSE PRACTITIONER

## 2025-02-04 PROCEDURE — 1160F RVW MEDS BY RX/DR IN RCRD: CPT | Performed by: NURSE PRACTITIONER

## 2025-02-04 PROCEDURE — 3078F DIAST BP <80 MM HG: CPT | Performed by: NURSE PRACTITIONER

## 2025-02-04 PROCEDURE — G8427 DOCREV CUR MEDS BY ELIG CLIN: HCPCS | Performed by: NURSE PRACTITIONER

## 2025-02-04 PROCEDURE — 1123F ACP DISCUSS/DSCN MKR DOCD: CPT | Performed by: NURSE PRACTITIONER

## 2025-02-04 PROCEDURE — 2022F DILAT RTA XM EVC RTNOPTHY: CPT | Performed by: NURSE PRACTITIONER

## 2025-02-04 PROCEDURE — 3017F COLORECTAL CA SCREEN DOC REV: CPT | Performed by: NURSE PRACTITIONER

## 2025-02-04 PROCEDURE — G8417 CALC BMI ABV UP PARAM F/U: HCPCS | Performed by: NURSE PRACTITIONER

## 2025-02-04 PROCEDURE — 4004F PT TOBACCO SCREEN RCVD TLK: CPT | Performed by: NURSE PRACTITIONER

## 2025-02-04 RX ORDER — INSULIN DEGLUDEC 200 U/ML
INJECTION, SOLUTION SUBCUTANEOUS
Qty: 3 ADJUSTABLE DOSE PRE-FILLED PEN SYRINGE | Refills: 3 | Status: SHIPPED | OUTPATIENT
Start: 2025-02-04

## 2025-02-04 NOTE — PATIENT INSTRUCTIONS
Avoid metformin with CKD  Avoid GLP-1 with hx of GI issues  Avoid Sulfonylurea  due to inconsistent eating habits  Avoid SGLT-2 due to UTI in  2024 caused hospitalization   Avoid DPP-4  with GI issues and HF  Will continue current regimen  Continue Guero  Pt to bring Guero perez into office in the next week  or so for download

## 2025-02-04 NOTE — PROGRESS NOTES
[facility]  [facility] Department of Endocrinology Diabetes and Metabolism   [address], [place] [address]  Phone: [phone]  Fax: [phone]    Date of Service: 2/4/2025    Primary Care Physician: Ulises Skinner DO  Referring physician: Ulises Skinner DO  Provider: PATRIZIA Porter NP     Reason for the visit:    New pt referral, Type 2 DM     History of Present Illness:  The history is provided by the patient. No  was used. Accuracy of the patient data is excellent.  Steph Gonzáles is a very pleasant 69 y.o. female seen today for diabetes management     Steph Gonzáles was diagnosed with diabetes at age  39  and currently on Tresiba 5 units at night, Humalog SS at meals    She has never tried oral regimens    She is here today to stop insulin and trial oral regimens    The patient has been checking blood sugar  Guero   She left  at home      Most recent A1c results summarized below  Lab Results   Component Value Date/Time    LABA1C 6.1 02/04/2025 08:57 AM    LABA1C 5.6 11/12/2024 12:53 PM    LABA1C 5.0 07/03/2024 12:14 PM       Patient has had no hypoglycemic episodes     The patient hasn't been mindful of what has been eating and wasn't following diabetes diet    HX of GI issues with trouble with digestion and impairs her appetite    She eats breakfast \"sometimes\" when she does  it's  a pancake with blueberries with sausage  Snacks on fresh fruit    Lunch is a turkey sandwich or soup    Dinner is a home cooked     Snack before bed is apples and pb crackers    I reviewed current medications and the patient has no issues with diabetes medications    Steph Gonzáles is up to date with eye exam and denied any history of diabetic retinopathy     The patient seeing podiatrist reguarly  Microvascular complications:  No Retinopathy, Nephropathy or Neuropathy   Macrovascular complications: no CAD, PVD, or Stroke    No HX of

## 2025-02-11 ENCOUNTER — TELEPHONE (OUTPATIENT)
Dept: ENDOCRINOLOGY | Age: 70
End: 2025-02-11
Payer: MEDICARE

## 2025-02-11 DIAGNOSIS — E11.65 POORLY CONTROLLED DIABETES MELLITUS (HCC): Primary | ICD-10-CM

## 2025-02-11 DIAGNOSIS — E11.9 TYPE 2 DIABETES MELLITUS WITHOUT COMPLICATION, WITH LONG-TERM CURRENT USE OF INSULIN (HCC): ICD-10-CM

## 2025-02-11 DIAGNOSIS — Z79.4 TYPE 2 DIABETES MELLITUS WITHOUT COMPLICATION, WITH LONG-TERM CURRENT USE OF INSULIN (HCC): ICD-10-CM

## 2025-02-11 PROCEDURE — 95251 CONT GLUC MNTR ANALYSIS I&R: CPT | Performed by: NURSE PRACTITIONER

## 2025-02-11 NOTE — TELEPHONE ENCOUNTER
Guero reviewed, no changes, BS at goal    Continuous Glucose Monitoring (CGM) download and interpretation   I personally reviewed and interpreted continuous glucose monitor (CGM) download. CGM report was discussed with patient including blood glucose patterns, percentages of blood glucose at goal, above goal and below goal. Insulin dosages/antidiabetic regimen was adjusted according to CGM download. Full CGM was scanned under media.     TIR  90%  Hyperglycemia 9%  Lows 1%  AVG BS  134  GMI  6.5%

## 2025-02-17 RX ORDER — BUMETANIDE 1 MG/1
1 TABLET ORAL DAILY PRN
Qty: 90 TABLET | Refills: 1 | Status: SHIPPED | OUTPATIENT
Start: 2025-02-17

## 2025-02-19 ENCOUNTER — OFFICE VISIT (OUTPATIENT)
Dept: FAMILY MEDICINE CLINIC | Age: 70
End: 2025-02-19

## 2025-02-19 VITALS
SYSTOLIC BLOOD PRESSURE: 110 MMHG | DIASTOLIC BLOOD PRESSURE: 66 MMHG | TEMPERATURE: 97.6 F | HEIGHT: 62 IN | BODY MASS INDEX: 36.23 KG/M2 | WEIGHT: 196.9 LBS | RESPIRATION RATE: 18 BRPM | HEART RATE: 76 BPM | OXYGEN SATURATION: 97 %

## 2025-02-19 DIAGNOSIS — Z76.0 MEDICATION REFILL: ICD-10-CM

## 2025-02-19 DIAGNOSIS — R05.1 ACUTE COUGH: ICD-10-CM

## 2025-02-19 DIAGNOSIS — Z00.00 MEDICARE ANNUAL WELLNESS VISIT, SUBSEQUENT: Primary | ICD-10-CM

## 2025-02-19 DIAGNOSIS — R50.9 FEVER, UNSPECIFIED FEVER CAUSE: ICD-10-CM

## 2025-02-19 DIAGNOSIS — M62.838 MUSCLE SPASM: ICD-10-CM

## 2025-02-19 DIAGNOSIS — R30.0 BURNING WITH URINATION: ICD-10-CM

## 2025-02-19 LAB
BILIRUBIN, POC: NEGATIVE
BLOOD URINE, POC: NEGATIVE
CLARITY, POC: CLEAR
COLOR, POC: NORMAL
GLUCOSE URINE, POC: NEGATIVE MG/DL
INFLUENZA A ANTIGEN, POC: NEGATIVE
INFLUENZA B ANTIGEN, POC: NEGATIVE
KETONES, POC: NEGATIVE MG/DL
LEUKOCYTE EST, POC: NEGATIVE
Lab: NORMAL
NITRITE, POC: NEGATIVE
PERFORMING INSTRUMENT: NORMAL
PH, POC: 6
PROTEIN, POC: 100 MG/DL
QC PASS/FAIL: NORMAL
SARS-COV-2, POC: NORMAL
SPECIFIC GRAVITY, POC: >=1.03
UROBILINOGEN, POC: 0.2 MG/DL

## 2025-02-19 RX ORDER — NYSTATIN 100000 [USP'U]/G
POWDER TOPICAL
Qty: 60 G | Refills: 2 | Status: SHIPPED | OUTPATIENT
Start: 2025-02-19

## 2025-02-19 RX ORDER — ACETAMINOPHEN 160 MG
2000 TABLET,DISINTEGRATING ORAL DAILY
Qty: 90 CAPSULE | Refills: 1 | Status: SHIPPED | OUTPATIENT
Start: 2025-02-19

## 2025-02-19 RX ORDER — TRAZODONE HYDROCHLORIDE 150 MG/1
150 TABLET ORAL NIGHTLY
Qty: 90 TABLET | Refills: 1 | Status: SHIPPED | OUTPATIENT
Start: 2025-02-19 | End: 2025-08-18

## 2025-02-19 RX ORDER — SIMVASTATIN 20 MG
20 TABLET ORAL NIGHTLY
Qty: 90 TABLET | Refills: 3 | Status: SHIPPED | OUTPATIENT
Start: 2025-02-19

## 2025-02-19 ASSESSMENT — PATIENT HEALTH QUESTIONNAIRE - PHQ9
1. LITTLE INTEREST OR PLEASURE IN DOING THINGS: SEVERAL DAYS
8. MOVING OR SPEAKING SO SLOWLY THAT OTHER PEOPLE COULD HAVE NOTICED. OR THE OPPOSITE, BEING SO FIGETY OR RESTLESS THAT YOU HAVE BEEN MOVING AROUND A LOT MORE THAN USUAL: NOT AT ALL
2. FEELING DOWN, DEPRESSED OR HOPELESS: NEARLY EVERY DAY
SUM OF ALL RESPONSES TO PHQ QUESTIONS 1-9: 13
4. FEELING TIRED OR HAVING LITTLE ENERGY: NEARLY EVERY DAY
5. POOR APPETITE OR OVEREATING: SEVERAL DAYS
SUM OF ALL RESPONSES TO PHQ9 QUESTIONS 1 & 2: 4
SUM OF ALL RESPONSES TO PHQ QUESTIONS 1-9: 13
SUM OF ALL RESPONSES TO PHQ QUESTIONS 1-9: 13
7. TROUBLE CONCENTRATING ON THINGS, SUCH AS READING THE NEWSPAPER OR WATCHING TELEVISION: SEVERAL DAYS
10. IF YOU CHECKED OFF ANY PROBLEMS, HOW DIFFICULT HAVE THESE PROBLEMS MADE IT FOR YOU TO DO YOUR WORK, TAKE CARE OF THINGS AT HOME, OR GET ALONG WITH OTHER PEOPLE: SOMEWHAT DIFFICULT
6. FEELING BAD ABOUT YOURSELF - OR THAT YOU ARE A FAILURE OR HAVE LET YOURSELF OR YOUR FAMILY DOWN: SEVERAL DAYS
3. TROUBLE FALLING OR STAYING ASLEEP: NEARLY EVERY DAY
SUM OF ALL RESPONSES TO PHQ QUESTIONS 1-9: 13
9. THOUGHTS THAT YOU WOULD BE BETTER OFF DEAD, OR OF HURTING YOURSELF: NOT AT ALL

## 2025-02-19 NOTE — RESULT ENCOUNTER NOTE
Possible evidence of fluid overload on cxr. She should take bumex once daily for the next 2-3 days and monitor weight and monitor for any new swelling or worsening of symptoms including chest pain or shortness of breath. She should call us Friday with an update on her symptoms. If not improving or worsening, she should be evaluated in the ED.

## 2025-02-19 NOTE — PROGRESS NOTES
Medicare Annual Wellness Visit    Steph Gonzáles is here for Medicare AWV, Cough (X 3-4 weeks-- meetshermes ramirez helps some/Sometimes she coughs so hard she throws up), Abscess (Patient states she noticed boil/abscess last week. Keeps getting bigger and hurts.), and burning with urination    Assessment & Plan   Burning with urination  -     POCT Urinalysis no Micro  Muscle spasm  The following orders have not been finalized:  -     tiZANidine (ZANAFLEX) 4 MG tablet       No follow-ups on file.     Subjective       Patient's complete Health Risk Assessment and screening values have been reviewed and are found in Flowsheets. The following problems were reviewed today and where indicated follow up appointments were made and/or referrals ordered.    Positive Risk Factor Screenings with Interventions:    Fall Risk:  Do you feel unsteady or are you worried about falling? : (!) yes  2 or more falls in past year?: no  Fall with injury in past year?: no     Interventions:    Reviewed medications, home hazards, visual acuity, and co-morbidities that can increase risk for falls     Depression:  PHQ-2 Score: 4  PHQ-9 Total Score: 13  Total Score Interpretation: 10-14 = moderate depression    Interventions:  Patient advised to follow-up in this office for further evaluation and treatment     Drug Use:   Substance and Sexual Activity   Drug Use Yes    Types: Marijuana (Weed)    Comment: 4 times per week       Interventions:  Patient declined any further intervention or treatment        General HRA Questions:  Select all that apply: (!) New or Increased Pain, New or Increased Fatigue, Loneliness, Social Isolation, Stress    Interventions - Pain:  Chronic. No new intervention. Spasm. Cont muscle relaxer  Interventions Fatigue:  Acute care testing  Interventions - Loneliness:  See AVS for additional education material  Interventions - Social Isolation:  See AVS for additional education material  Interventions - Stress:  See AVS for

## 2025-02-19 NOTE — PROGRESS NOTES
Diarrhea - fatigue - hypoglycemia worse at the beginning of the week but has since improved but now with cough, fever, chills.   Rash lower abdomen  Burning with urination  Check urine, cxr, flu, covid.   Neg flu, covid. Urine ok  Cont to monitor for improvement with otc treatment    Lungs rales/rhonchi  No respi distress  No fever on vitals  O2 ok, bp, etc ok  No hf signs/sx other than rales    Lab Results   Component Value Date     11/13/2024    K 4.0 11/13/2024     11/13/2024    CO2 25 11/13/2024    BUN 22 11/13/2024    CREATININE 1.4 (H) 11/13/2024    GLUCOSE 103 (H) 11/13/2024    CALCIUM 8.3 (L) 11/13/2024    BILITOT 0.3 11/12/2024    ALKPHOS 71 11/12/2024    AST 17 11/12/2024    ALT 20 11/12/2024    LABGLOM 43 (L) 11/13/2024    GFRAA 54 10/17/2022       Lab Results   Component Value Date    WBC 6.7 11/13/2024    HGB 11.6 11/13/2024    HCT 36.7 11/13/2024    .0 (H) 11/13/2024     11/13/2024     /66   Pulse 76   Temp 97.6 °F (36.4 °C)   Resp 18   Ht 1.575 m (5' 2\")   Wt 89.3 kg (196 lb 14.4 oz)   LMP 01/01/1990   SpO2 97%   BMI 36.01 kg/m²

## 2025-02-20 ENCOUNTER — HOSPITAL ENCOUNTER (OUTPATIENT)
Dept: INTERVENTIONAL RADIOLOGY/VASCULAR | Age: 70
Discharge: HOME | End: 2025-02-22
Attending: SURGERY
Payer: MEDICARE

## 2025-02-20 ENCOUNTER — TELEPHONE (OUTPATIENT)
Dept: ENDOCRINOLOGY | Age: 70
End: 2025-02-20

## 2025-02-20 ENCOUNTER — HOSPITAL ENCOUNTER (OUTPATIENT)
Age: 70
Discharge: HOME | End: 2025-02-22
Attending: SURGERY
Payer: MEDICARE

## 2025-02-20 ENCOUNTER — HOSPITAL ENCOUNTER (OUTPATIENT)
Age: 70
Discharge: HOME | End: 2025-02-20
Attending: SURGERY
Payer: MEDICARE

## 2025-02-20 ENCOUNTER — HOSPITAL ENCOUNTER (OUTPATIENT)
Dept: GENERAL RADIOLOGY | Age: 70
Discharge: HOME | End: 2025-02-22
Attending: SURGERY
Payer: MEDICARE

## 2025-02-20 DIAGNOSIS — Z98.62 STATUS POST PERIPHERAL ARTERY ANGIOPLASTY: ICD-10-CM

## 2025-02-20 DIAGNOSIS — I10 PRIMARY HYPERTENSION: Chronic | ICD-10-CM

## 2025-02-20 DIAGNOSIS — I73.9 PVD (PERIPHERAL VASCULAR DISEASE) WITH CLAUDICATION: ICD-10-CM

## 2025-02-20 DIAGNOSIS — R52 PAIN: ICD-10-CM

## 2025-02-20 DIAGNOSIS — M79.604 PAIN IN RIGHT LEG: ICD-10-CM

## 2025-02-20 LAB
ALBUMIN SERPL-MCNC: 4.4 G/DL (ref 3.5–5.2)
ALP SERPL-CCNC: 74 U/L (ref 35–104)
ALT SERPL-CCNC: 21 U/L (ref 0–32)
ANION GAP SERPL CALCULATED.3IONS-SCNC: 11 MMOL/L (ref 7–16)
AST SERPL-CCNC: 20 U/L (ref 0–31)
BILIRUB SERPL-MCNC: 0.2 MG/DL (ref 0–1.2)
BUN SERPL-MCNC: 30 MG/DL (ref 6–23)
CALCIUM SERPL-MCNC: 9.3 MG/DL (ref 8.6–10.2)
CHLORIDE SERPL-SCNC: 102 MMOL/L (ref 98–107)
CO2 SERPL-SCNC: 23 MMOL/L (ref 22–29)
CREAT SERPL-MCNC: 1.5 MG/DL (ref 0.5–1)
GFR, ESTIMATED: 36 ML/MIN/1.73M2
GLUCOSE SERPL-MCNC: 127 MG/DL (ref 74–99)
POTASSIUM SERPL-SCNC: 6.5 MMOL/L (ref 3.5–5)
PROT SERPL-MCNC: 8 G/DL (ref 6.4–8.3)
SODIUM SERPL-SCNC: 136 MMOL/L (ref 132–146)

## 2025-02-20 PROCEDURE — 36415 COLL VENOUS BLD VENIPUNCTURE: CPT

## 2025-02-20 PROCEDURE — 93923 UPR/LXTR ART STDY 3+ LVLS: CPT

## 2025-02-20 PROCEDURE — 80053 COMPREHEN METABOLIC PANEL: CPT

## 2025-02-20 PROCEDURE — 73630 X-RAY EXAM OF FOOT: CPT

## 2025-02-20 NOTE — TELEPHONE ENCOUNTER
Pt complaining for her sugars dropping into the 70's     641am 77   721am 103    Says this happens every day where she is dropping, I told her to double check with a fingerstick to make sure it isn't a false low and that I would message you. Did not take her insulin due to being low.

## 2025-02-24 ENCOUNTER — APPOINTMENT (OUTPATIENT)
Dept: GENERAL RADIOLOGY | Age: 70
End: 2025-02-24
Payer: MEDICARE

## 2025-02-24 ENCOUNTER — APPOINTMENT (OUTPATIENT)
Dept: CT IMAGING | Age: 70
End: 2025-02-24
Payer: MEDICARE

## 2025-02-24 ENCOUNTER — HOSPITAL ENCOUNTER (EMERGENCY)
Age: 70
Discharge: HOME | End: 2025-02-24
Payer: MEDICARE

## 2025-02-24 ENCOUNTER — TELEPHONE (OUTPATIENT)
Dept: VASCULAR SURGERY | Age: 70
End: 2025-02-24

## 2025-02-24 VITALS
DIASTOLIC BLOOD PRESSURE: 73 MMHG | OXYGEN SATURATION: 93 % | HEART RATE: 84 BPM | SYSTOLIC BLOOD PRESSURE: 135 MMHG | RESPIRATION RATE: 26 BRPM | TEMPERATURE: 98.2 F

## 2025-02-24 DIAGNOSIS — J18.9 PNEUMONIA DUE TO INFECTIOUS ORGANISM, UNSPECIFIED LATERALITY, UNSPECIFIED PART OF LUNG: ICD-10-CM

## 2025-02-24 DIAGNOSIS — R06.00 DYSPNEA AND RESPIRATORY ABNORMALITIES: ICD-10-CM

## 2025-02-24 DIAGNOSIS — R06.89 DYSPNEA AND RESPIRATORY ABNORMALITIES: ICD-10-CM

## 2025-02-24 DIAGNOSIS — B34.9 VIRAL SYNDROME: ICD-10-CM

## 2025-02-24 DIAGNOSIS — R52 BODY ACHES: Primary | ICD-10-CM

## 2025-02-24 LAB
ANION GAP SERPL CALCULATED.3IONS-SCNC: 13 MMOL/L (ref 7–16)
BASOPHILS # BLD: 0.03 K/UL (ref 0–0.2)
BASOPHILS NFR BLD: 1 % (ref 0–2)
BNP SERPL-MCNC: 356 PG/ML (ref 0–125)
BUN SERPL-MCNC: 35 MG/DL (ref 6–23)
CALCIUM SERPL-MCNC: 8.9 MG/DL (ref 8.6–10.2)
CHLORIDE SERPL-SCNC: 101 MMOL/L (ref 98–107)
CO2 SERPL-SCNC: 22 MMOL/L (ref 22–29)
CREAT SERPL-MCNC: 1.7 MG/DL (ref 0.5–1)
EKG ATRIAL RATE: 90 BPM
EKG P AXIS: 50 DEGREES
EKG P-R INTERVAL: 166 MS
EKG Q-T INTERVAL: 422 MS
EKG QRS DURATION: 120 MS
EKG QTC CALCULATION (BAZETT): 516 MS
EKG R AXIS: -23 DEGREES
EKG T AXIS: 92 DEGREES
EKG VENTRICULAR RATE: 90 BPM
EOSINOPHIL # BLD: 0.09 K/UL (ref 0.05–0.5)
EOSINOPHILS RELATIVE PERCENT: 2 % (ref 0–6)
ERYTHROCYTE [DISTWIDTH] IN BLOOD BY AUTOMATED COUNT: 12.8 % (ref 11.5–15)
FLUAV RNA RESP QL NAA+PROBE: NOT DETECTED
FLUBV RNA RESP QL NAA+PROBE: NOT DETECTED
GFR, ESTIMATED: 31 ML/MIN/1.73M2
GLUCOSE SERPL-MCNC: 155 MG/DL (ref 74–99)
HCT VFR BLD AUTO: 34.9 % (ref 34–48)
HGB BLD-MCNC: 11 G/DL (ref 11.5–15.5)
IMM GRANULOCYTES # BLD AUTO: <0.03 K/UL (ref 0–0.58)
IMM GRANULOCYTES NFR BLD: 0 % (ref 0–5)
LYMPHOCYTES NFR BLD: 1.03 K/UL (ref 1.5–4)
LYMPHOCYTES RELATIVE PERCENT: 18 % (ref 20–42)
MCH RBC QN AUTO: 31.6 PG (ref 26–35)
MCHC RBC AUTO-ENTMCNC: 31.5 G/DL (ref 32–34.5)
MCV RBC AUTO: 100.3 FL (ref 80–99.9)
MONOCYTES NFR BLD: 0.31 K/UL (ref 0.1–0.95)
MONOCYTES NFR BLD: 5 % (ref 2–12)
NEUTROPHILS NFR BLD: 75 % (ref 43–80)
NEUTS SEG NFR BLD: 4.34 K/UL (ref 1.8–7.3)
PLATELET # BLD AUTO: 209 K/UL (ref 130–450)
PMV BLD AUTO: 11.5 FL (ref 7–12)
POTASSIUM SERPL-SCNC: 5.1 MMOL/L (ref 3.5–5)
RBC # BLD AUTO: 3.48 M/UL (ref 3.5–5.5)
RSV BY PCR: NOT DETECTED
SARS-COV-2 RNA RESP QL NAA+PROBE: NOT DETECTED
SODIUM SERPL-SCNC: 136 MMOL/L (ref 132–146)
SOURCE: NORMAL
SPECIMEN DESCRIPTION: NORMAL
SPECIMEN SOURCE: NORMAL
WBC OTHER # BLD: 5.8 K/UL (ref 4.5–11.5)

## 2025-02-24 PROCEDURE — 80048 BASIC METABOLIC PNL TOTAL CA: CPT

## 2025-02-24 PROCEDURE — 93005 ELECTROCARDIOGRAM TRACING: CPT | Performed by: NURSE PRACTITIONER

## 2025-02-24 PROCEDURE — 6370000000 HC RX 637 (ALT 250 FOR IP): Performed by: NURSE PRACTITIONER

## 2025-02-24 PROCEDURE — 99285 EMERGENCY DEPT VISIT HI MDM: CPT

## 2025-02-24 PROCEDURE — 71046 X-RAY EXAM CHEST 2 VIEWS: CPT

## 2025-02-24 PROCEDURE — 83880 ASSAY OF NATRIURETIC PEPTIDE: CPT

## 2025-02-24 PROCEDURE — 87636 SARSCOV2 & INF A&B AMP PRB: CPT

## 2025-02-24 PROCEDURE — 71250 CT THORAX DX C-: CPT

## 2025-02-24 PROCEDURE — 85025 COMPLETE CBC W/AUTO DIFF WBC: CPT

## 2025-02-24 PROCEDURE — 87634 RSV DNA/RNA AMP PROBE: CPT

## 2025-02-24 PROCEDURE — 93010 ELECTROCARDIOGRAM REPORT: CPT | Performed by: INTERNAL MEDICINE

## 2025-02-24 RX ORDER — ACETAMINOPHEN 325 MG/1
650 TABLET ORAL ONCE
Status: DISCONTINUED | OUTPATIENT
Start: 2025-02-24 | End: 2025-02-24 | Stop reason: HOSPADM

## 2025-02-24 RX ORDER — DOXYCYCLINE HYCLATE 100 MG
100 TABLET ORAL 2 TIMES DAILY
Qty: 20 TABLET | Refills: 0 | Status: SHIPPED | OUTPATIENT
Start: 2025-02-24 | End: 2025-03-06

## 2025-02-24 RX ORDER — TRAMADOL HYDROCHLORIDE 50 MG/1
25 TABLET ORAL ONCE
Status: COMPLETED | OUTPATIENT
Start: 2025-02-24 | End: 2025-02-24

## 2025-02-24 RX ADMIN — TRAMADOL HYDROCHLORIDE 25 MG: 50 TABLET, COATED ORAL at 15:28

## 2025-02-24 ASSESSMENT — PAIN DESCRIPTION - FREQUENCY: FREQUENCY: CONTINUOUS

## 2025-02-24 ASSESSMENT — PAIN DESCRIPTION - DESCRIPTORS
DESCRIPTORS: ACHING;DULL;DISCOMFORT
DESCRIPTORS: DISCOMFORT;ACHING;SORE

## 2025-02-24 ASSESSMENT — PAIN DESCRIPTION - LOCATION
LOCATION: GENERALIZED
LOCATION: GENERALIZED

## 2025-02-24 ASSESSMENT — PAIN - FUNCTIONAL ASSESSMENT: PAIN_FUNCTIONAL_ASSESSMENT: 0-10

## 2025-02-24 ASSESSMENT — PAIN DESCRIPTION - PAIN TYPE: TYPE: ACUTE PAIN

## 2025-02-24 ASSESSMENT — PAIN SCALES - GENERAL
PAINLEVEL_OUTOF10: 7
PAINLEVEL_OUTOF10: 10

## 2025-02-24 ASSESSMENT — PAIN DESCRIPTION - ORIENTATION: ORIENTATION: RIGHT

## 2025-02-24 ASSESSMENT — PAIN DESCRIPTION - ONSET: ONSET: ON-GOING

## 2025-02-24 NOTE — TELEPHONE ENCOUNTER
Discussed the patient regarding the lower extremity artery Doppler study, no significant change, for now continue the walking program, keep the next appointment call me as needed if any symptoms worsen, all the questions were answered

## 2025-02-24 NOTE — TELEPHONE ENCOUNTER
Patient called to get her results.  She can be reached at [phone]  She is having some discomfort.

## 2025-02-24 NOTE — ED TRIAGE NOTES
Department of Emergency Medicine  FIRST PROVIDER TRIAGE NOTE             Independent MLP           2/24/25  12:11 PM EST    Date of Encounter: 2/24/25   MRN: [id number]      HPI: Steph Gonzáles is a 69 y.o. female who presents to the ED for Generalized Body Aches (Whole body hurting more on right side, coughing, drainage)   States Dr told her has has fluid build up in her lung and to increase her duiretic    ROS: Negative for cp or fever.    PE: Gen Appearance/Constitutional: alert  Musculoskeletal: moves all extremities x 4       Initial Plan of Care: Initiate Treatment-Testing, Proceed toTreatment Area When Bed Available for ED Attending/MLP to Continue Care.  No beds available at this time due to high census, decreased staffing, and/or patient boarding.  No diagnoses able to me based on this brief medical screening exam.  This ends patient provider relationship.   Electronically signed by PATRIZIA Carver CNP   DD: 2/24/25

## 2025-02-24 NOTE — ED NOTES
Specimen cup given for urine sample   [FreeTextEntry1] : Mrs. Degroot is a 84-year-old woman s/p MVR, TV repair and chronic AF.\par She is doing well clinically.\par I have scheduled an echo to monitor the MVR/TV\par Her AF is asymptomatic and she is anticoagulated.\par No CHF on exam.\par rehab pushed off due to foot injury.\par borderline K. will stop banana.\par \par see me min 3 weeks. \par

## 2025-02-24 NOTE — DISCHARGE INSTRUCTIONS
Finish antibiotics  Call doctor for followup in two days    Rest  Your potassium level was elevated, you will need to have repeat lab work in 1-2 days. This is likely due to your water pill increase.

## 2025-02-24 NOTE — ED PROVIDER NOTES
Independent THOMPSON Visit.     HPI:  2/24/25, Time: 2:13 PM VALERIA Gonzáles is a 69 y.o. female presenting to the ED for right side chest wall pain fever body aches, beginning few days ago.  The complaint has been persistent, moderate in severity, and worsened by nothing.  Pt reports she was at her family doctor on Friday and had neg flu covid rsv. Still not well. Feels cold. Also having leg pain which is not new. She just saw Mary for her us of legs. She does have pvd. She was recently increased her bumex for her heart failure. No chest pain. No home 02.     ROS:   Pertinent positives and negatives are stated within HPI, all other systems reviewed and are negative.  --------------------------------------------- PAST HISTORY ---------------------------------------------  Past Medical History:  has a past medical history of Acute kidney injury superimposed on CKD, Asthma, Atherosclerosis of native artery of left leg with rest pain ([facility]), Atherosclerosis of native artery of right lower extremity with ulceration of calf ([facility]), Atrioventricular block, CAD (coronary artery disease), Cellulitis and abscess of trunk, Cerebellar infarct ([facility]), Chronic back pain, Chronic kidney disease, Chronic systolic congestive heart failure ([facility]), Chronic venous insufficiency, COPD (chronic obstructive pulmonary disease) ([facility]), COVID-19, Depression, Diabetes mellitus (HCC), Diabetic neuropathy (HCC), Diverticulosis, Fatty liver, Glaucoma, open angle, Hepatic encephalopathy ([facility]), Hiatal hernia, History of blood transfusion, Hyperlipidemia, Hyperplastic colon polyp, Hypertension, Incontinence, Liver mass, Lower limb ulcer, calf, right, limited to breakdown of skin (HCC), Lower limb ulcer, calf, right, with fat layer exposed (HCC), Morbid obesity, Movement disorder, Myocardial infarction ([facility]), O2 dependent, Osteoarthritis, generalized, Pain in both lower legs, Peripheral vascular disease, Pneumonia, Pulmonary edema, PVD

## 2025-02-24 NOTE — TELEPHONE ENCOUNTER
Message left for the patient, overall no significant changes noted compared to the last study, ankle-brachial index of 0.79 on the right and 0.88 on the left, to keep the next appointment, but call the office if any questions or concerns

## 2025-02-25 ENCOUNTER — TELEPHONE (OUTPATIENT)
Dept: FAMILY MEDICINE CLINIC | Age: 70
End: 2025-02-25

## 2025-02-25 NOTE — TELEPHONE ENCOUNTER
Called to see why patient needed something for pain.  She states she is having pain on her right side. Her whole right side hurts.  Please advise.

## 2025-02-25 NOTE — TELEPHONE ENCOUNTER
PT called and said she was in the ER yesterday and they told her she has an pneumonia.  PT is now asking if doctor can prescribe her a cough medicine and something for the pain.  I explained to pt that doctor usually doesn't prescribe pain meds, but she still wanted me to send the message to doctor.

## 2025-02-27 DIAGNOSIS — R05.1 ACUTE COUGH: Primary | ICD-10-CM

## 2025-02-27 RX ORDER — BENZONATATE 100 MG/1
100-200 CAPSULE ORAL 3 TIMES DAILY PRN
Qty: 30 CAPSULE | Refills: 0 | Status: SHIPPED
Start: 2025-02-27 | End: 2025-02-27

## 2025-02-27 NOTE — TELEPHONE ENCOUNTER
Called Pt and gave message from Provider. Pt advised she already has the tessalon and they don't work.  She states she cannot afford to get the OTC medication also. Requesting something else.

## 2025-02-27 NOTE — TELEPHONE ENCOUNTER
Patient called again and said a nurse on a [facility] line prescribed prednisone and a cough medicine, but the cough medicine is not covered and she is calling us to ask doctor to prescribe a cough medicine that is covered by her insurance. I asked if she knew the name of the medication and she stated she did not that we would have to [facility] and get the name of the medication so we do not prescribe the same cough medicine that the insurance does not cover.

## 2025-03-04 ENCOUNTER — TELEPHONE (OUTPATIENT)
Dept: ENDOCRINOLOGY | Age: 70
End: 2025-03-04

## 2025-03-04 NOTE — TELEPHONE ENCOUNTER
Guero report and accu chek guide report attached for review. Patient states she has been taking prednisone for the last 5 days. She is worried about the inaccuracy of the guero reading     2/11/25: currently on Tresiba 5 units at night, Humalog SS at meals

## 2025-03-05 ENCOUNTER — OFFICE VISIT (OUTPATIENT)
Dept: FAMILY MEDICINE CLINIC | Age: 70
End: 2025-03-05
Payer: MEDICARE

## 2025-03-05 VITALS
RESPIRATION RATE: 22 BRPM | OXYGEN SATURATION: 93 % | WEIGHT: 196 LBS | SYSTOLIC BLOOD PRESSURE: 128 MMHG | HEART RATE: 96 BPM | BODY MASS INDEX: 36.07 KG/M2 | HEIGHT: 62 IN | TEMPERATURE: 98.1 F | DIASTOLIC BLOOD PRESSURE: 66 MMHG

## 2025-03-05 DIAGNOSIS — R35.0 URINARY FREQUENCY: ICD-10-CM

## 2025-03-05 DIAGNOSIS — J44.9 CHRONIC OBSTRUCTIVE PULMONARY DISEASE, UNSPECIFIED COPD TYPE (HCC): ICD-10-CM

## 2025-03-05 DIAGNOSIS — R06.02 SHORTNESS OF BREATH: Primary | ICD-10-CM

## 2025-03-05 DIAGNOSIS — N18.30 STAGE 3 CHRONIC KIDNEY DISEASE, UNSPECIFIED WHETHER STAGE 3A OR 3B CKD (HCC): ICD-10-CM

## 2025-03-05 DIAGNOSIS — H92.03 EAR PAIN, BILATERAL: ICD-10-CM

## 2025-03-05 LAB
BILIRUBIN, POC: NEGATIVE
BLOOD URINE, POC: NEGATIVE
CLARITY, POC: CLEAR
COLOR, POC: YELLOW
GLUCOSE URINE, POC: NEGATIVE MG/DL
KETONES, POC: NEGATIVE MG/DL
LEUKOCYTE EST, POC: NEGATIVE
NITRITE, POC: NEGATIVE
PH, POC: 5.5
PROTEIN, POC: NORMAL MG/DL
SPECIFIC GRAVITY, POC: 1.01
UROBILINOGEN, POC: NORMAL MG/DL

## 2025-03-05 PROCEDURE — G8417 CALC BMI ABV UP PARAM F/U: HCPCS | Performed by: STUDENT IN AN ORGANIZED HEALTH CARE EDUCATION/TRAINING PROGRAM

## 2025-03-05 PROCEDURE — 1090F PRES/ABSN URINE INCON ASSESS: CPT | Performed by: STUDENT IN AN ORGANIZED HEALTH CARE EDUCATION/TRAINING PROGRAM

## 2025-03-05 PROCEDURE — 4004F PT TOBACCO SCREEN RCVD TLK: CPT | Performed by: STUDENT IN AN ORGANIZED HEALTH CARE EDUCATION/TRAINING PROGRAM

## 2025-03-05 PROCEDURE — 1123F ACP DISCUSS/DSCN MKR DOCD: CPT | Performed by: STUDENT IN AN ORGANIZED HEALTH CARE EDUCATION/TRAINING PROGRAM

## 2025-03-05 PROCEDURE — G8427 DOCREV CUR MEDS BY ELIG CLIN: HCPCS | Performed by: STUDENT IN AN ORGANIZED HEALTH CARE EDUCATION/TRAINING PROGRAM

## 2025-03-05 PROCEDURE — 3017F COLORECTAL CA SCREEN DOC REV: CPT | Performed by: STUDENT IN AN ORGANIZED HEALTH CARE EDUCATION/TRAINING PROGRAM

## 2025-03-05 PROCEDURE — G2211 COMPLEX E/M VISIT ADD ON: HCPCS | Performed by: STUDENT IN AN ORGANIZED HEALTH CARE EDUCATION/TRAINING PROGRAM

## 2025-03-05 PROCEDURE — 3078F DIAST BP <80 MM HG: CPT | Performed by: STUDENT IN AN ORGANIZED HEALTH CARE EDUCATION/TRAINING PROGRAM

## 2025-03-05 PROCEDURE — 3074F SYST BP LT 130 MM HG: CPT | Performed by: STUDENT IN AN ORGANIZED HEALTH CARE EDUCATION/TRAINING PROGRAM

## 2025-03-05 PROCEDURE — 99214 OFFICE O/P EST MOD 30 MIN: CPT | Performed by: STUDENT IN AN ORGANIZED HEALTH CARE EDUCATION/TRAINING PROGRAM

## 2025-03-05 PROCEDURE — 3023F SPIROM DOC REV: CPT | Performed by: STUDENT IN AN ORGANIZED HEALTH CARE EDUCATION/TRAINING PROGRAM

## 2025-03-05 PROCEDURE — 81002 URINALYSIS NONAUTO W/O SCOPE: CPT | Performed by: STUDENT IN AN ORGANIZED HEALTH CARE EDUCATION/TRAINING PROGRAM

## 2025-03-05 PROCEDURE — G8399 PT W/DXA RESULTS DOCUMENT: HCPCS | Performed by: STUDENT IN AN ORGANIZED HEALTH CARE EDUCATION/TRAINING PROGRAM

## 2025-03-05 PROCEDURE — 1159F MED LIST DOCD IN RCRD: CPT | Performed by: STUDENT IN AN ORGANIZED HEALTH CARE EDUCATION/TRAINING PROGRAM

## 2025-03-05 RX ORDER — DICYCLOMINE HYDROCHLORIDE 10 MG/1
CAPSULE ORAL
COMMUNITY
Start: 2025-02-28

## 2025-03-05 RX ORDER — BENZONATATE 100 MG/1
CAPSULE ORAL 3 TIMES DAILY PRN
COMMUNITY
Start: 2025-02-27

## 2025-03-05 RX ORDER — LANCETS
EACH MISCELLANEOUS
COMMUNITY
Start: 2025-02-22

## 2025-03-05 NOTE — PROGRESS NOTES
evaluation in the emergency room. Advised I can order labs and CT but CT would not be done today so there is risk in waiting. She again refuses to go to the ED. She says they didn't do anything for her last time. I will trial augmentin for her. We will check a urine as well. We will order a CT and labs. She is advised to go to the ED if she is getting worse. Denies new chest pain or orthopnea. Prior echo with normal EF. Known CAD but no new assoc sx. Known COPD. We will send for a pulse ox. We will recheck kidney function for previous dana. She is advised to maintain good hydration. She should monitor weights. She can continue bumex for now given some lower extremity swelling. Bnp was ok previously, we will recheck that to see if any evidence of overload. May need to reduce use of bumex if kidney function is worse with acute illness. Pt advised to get labs today so we can address these issues asap.     Shortness of breath  -     CBC with Auto Differential; Future  -     Comprehensive Metabolic Panel; Future  -     CT CHEST WO CONTRAST; Future  -     DME Order for Pulse Ox as OP  -     Brain Natriuretic Peptide; Future  Ear pain, bilateral  -     amoxicillin-clavulanate (AUGMENTIN) 875-125 MG per tablet; Take 1 tablet by mouth 2 times daily for 10 days, Disp-20 tablet, R-0Normal  Stage 3 chronic kidney disease, unspecified whether stage 3a or 3b CKD (HCC)  Chronic obstructive pulmonary disease, unspecified COPD type (HCC)  Urinary frequency  -     POCT Urinalysis no Micro    Return in about 1 week (around 3/12/2025).      Ulises Skinner,      This document may have been prepared at least partially through the use of voice recognition software. Although effort is taken to assure the accuracy of this document, it is possible that grammatical, syntax,  or spelling errors may occur.

## 2025-03-06 DIAGNOSIS — E11.65 POORLY CONTROLLED DIABETES MELLITUS (HCC): Primary | ICD-10-CM

## 2025-03-07 ENCOUNTER — TELEPHONE (OUTPATIENT)
Dept: NON INVASIVE DIAGNOSTICS | Age: 70
End: 2025-03-07

## 2025-03-07 RX ORDER — BLOOD SUGAR DIAGNOSTIC
STRIP MISCELLANEOUS
Qty: 400 EACH | Refills: 3 | Status: SHIPPED | OUTPATIENT
Start: 2025-03-07

## 2025-03-07 NOTE — TELEPHONE ENCOUNTER
Stored EGMs are consistent with or suggestive of a Pause  Total episodes: 1  Longest episode:  Rhythm before episode:  Rhythm after episode:  Additional Notes:  1 pause alert on 3.5.2025 @9269. Duration 3 seconds.       Patient called back and stated she has been having dizziness and SOB but she also has been sick with pneumonia and has an ear infection.     Margie Galarza, CMA

## 2025-03-07 NOTE — TELEPHONE ENCOUNTER
Stored EGMs are consistent with or suggestive of a Pause  Total episodes: 1  Longest episode:  Rhythm before episode:  Rhythm after episode:  Additional Notes:  1 pause alert on 3.5.2025 @5481. Duration 3 seconds.      Called patient and left message to call office back.      Margie Galarza CMA

## 2025-03-11 RX ORDER — MINOXIDIL 2.5 MG/1
2.5 TABLET ORAL 2 TIMES DAILY
Qty: 180 TABLET | Refills: 1 | Status: SHIPPED | OUTPATIENT
Start: 2025-03-11

## 2025-03-11 RX ORDER — ISOSORBIDE MONONITRATE 60 MG/1
60 TABLET, EXTENDED RELEASE ORAL DAILY
Qty: 90 TABLET | Refills: 3 | Status: SHIPPED | OUTPATIENT
Start: 2025-03-11

## 2025-03-12 ENCOUNTER — TELEPHONE (OUTPATIENT)
Dept: FAMILY MEDICINE CLINIC | Age: 70
End: 2025-03-12

## 2025-03-12 NOTE — TELEPHONE ENCOUNTER
Please disregard message this was completed        Pt called she is waiting on her pulse ox, can we place an order and send to [facility] please

## 2025-03-14 ENCOUNTER — TELEPHONE (OUTPATIENT)
Dept: CARDIOLOGY CLINIC | Age: 70
End: 2025-03-14

## 2025-03-14 ENCOUNTER — TELEPHONE (OUTPATIENT)
Dept: NON INVASIVE DIAGNOSTICS | Age: 70
End: 2025-03-14

## 2025-03-14 NOTE — TELEPHONE ENCOUNTER
Patient called stating she had a chest x-ray that revealed an enlarged heart as well as a hernia in the back of her heart.  She is asking for Dr. Boateng to review.

## 2025-03-17 NOTE — TELEPHONE ENCOUNTER
She is small hiatal hernia chest x-ray showed mild cardiomegaly but the echo done in May 20, 2024 showed no evidence of enlarged heart.  Echocardiogram is more accurate than a chest x-ray.

## 2025-03-19 RX ORDER — HYDRALAZINE HYDROCHLORIDE 100 MG/1
100 TABLET, FILM COATED ORAL 2 TIMES DAILY
Qty: 270 TABLET | Refills: 3 | Status: SHIPPED | OUTPATIENT
Start: 2025-03-19

## 2025-03-23 ENCOUNTER — APPOINTMENT (OUTPATIENT)
Dept: CT IMAGING | Age: 70
DRG: 291 | End: 2025-03-23
Payer: MEDICARE

## 2025-03-23 ENCOUNTER — HOSPITAL ENCOUNTER (INPATIENT)
Age: 70
LOS: 7 days | Discharge: HOME | DRG: 291 | End: 2025-03-30
Attending: STUDENT IN AN ORGANIZED HEALTH CARE EDUCATION/TRAINING PROGRAM | Admitting: HOSPITALIST
Payer: MEDICARE

## 2025-03-23 ENCOUNTER — APPOINTMENT (OUTPATIENT)
Dept: GENERAL RADIOLOGY | Age: 70
DRG: 291 | End: 2025-03-23
Payer: MEDICARE

## 2025-03-23 DIAGNOSIS — K29.70 GASTRITIS, PRESENCE OF BLEEDING UNSPECIFIED, UNSPECIFIED CHRONICITY, UNSPECIFIED GASTRITIS TYPE: ICD-10-CM

## 2025-03-23 DIAGNOSIS — I42.9 CARDIOMYOPATHY, IDIOPATHIC (HCC): ICD-10-CM

## 2025-03-23 DIAGNOSIS — M51.26 LUMBAR DISC HERNIATION: ICD-10-CM

## 2025-03-23 DIAGNOSIS — J18.9 PNEUMONIA OF BOTH LUNGS DUE TO INFECTIOUS ORGANISM, UNSPECIFIED PART OF LUNG: Primary | ICD-10-CM

## 2025-03-23 PROBLEM — E66.01 CLASS 2 SEVERE OBESITY WITH SERIOUS COMORBIDITY AND BODY MASS INDEX (BMI) OF 35.0 TO 35.9 IN ADULT: Status: ACTIVE | Noted: 2025-03-23

## 2025-03-23 PROBLEM — E66.812 CLASS 2 SEVERE OBESITY WITH SERIOUS COMORBIDITY AND BODY MASS INDEX (BMI) OF 35.0 TO 35.9 IN ADULT: Status: ACTIVE | Noted: 2025-03-23

## 2025-03-23 LAB
ALBUMIN SERPL-MCNC: 4 G/DL (ref 3.5–5.2)
ALP SERPL-CCNC: 82 U/L (ref 35–104)
ALT SERPL-CCNC: 17 U/L (ref 0–32)
ANION GAP SERPL CALCULATED.3IONS-SCNC: 11 MMOL/L (ref 7–16)
AST SERPL-CCNC: 10 U/L (ref 0–31)
B PARAP IS1001 DNA NPH QL NAA+NON-PROBE: NOT DETECTED
B PERT DNA SPEC QL NAA+PROBE: NOT DETECTED
BACTERIA URNS QL MICRO: ABNORMAL
BASOPHILS # BLD: 0.01 K/UL (ref 0–0.2)
BASOPHILS NFR BLD: 0 % (ref 0–2)
BILIRUB SERPL-MCNC: 0.2 MG/DL (ref 0–1.2)
BILIRUB UR QL STRIP: NEGATIVE
BNP SERPL-MCNC: 510 PG/ML (ref 0–125)
BUN SERPL-MCNC: 24 MG/DL (ref 6–23)
C PNEUM DNA NPH QL NAA+NON-PROBE: NOT DETECTED
CALCIUM SERPL-MCNC: 8.8 MG/DL (ref 8.6–10.2)
CHLORIDE SERPL-SCNC: 104 MMOL/L (ref 98–107)
CLARITY UR: ABNORMAL
CO2 SERPL-SCNC: 24 MMOL/L (ref 22–29)
COLOR UR: YELLOW
CREAT SERPL-MCNC: 1.7 MG/DL (ref 0.5–1)
EOSINOPHIL # BLD: 0.15 K/UL (ref 0.05–0.5)
EOSINOPHILS RELATIVE PERCENT: 2 % (ref 0–6)
EPI CELLS #/AREA URNS HPF: ABNORMAL /HPF
ERYTHROCYTE [DISTWIDTH] IN BLOOD BY AUTOMATED COUNT: 12.7 % (ref 11.5–15)
FLUAV RNA NPH QL NAA+NON-PROBE: NOT DETECTED
FLUBV RNA NPH QL NAA+NON-PROBE: NOT DETECTED
GFR, ESTIMATED: 31 ML/MIN/1.73M2
GLUCOSE BLD-MCNC: 110 MG/DL (ref 74–99)
GLUCOSE BLD-MCNC: 135 MG/DL (ref 74–99)
GLUCOSE SERPL-MCNC: 91 MG/DL (ref 74–99)
GLUCOSE UR STRIP-MCNC: NEGATIVE MG/DL
HADV DNA NPH QL NAA+NON-PROBE: NOT DETECTED
HCOV 229E RNA NPH QL NAA+NON-PROBE: NOT DETECTED
HCOV HKU1 RNA NPH QL NAA+NON-PROBE: NOT DETECTED
HCOV NL63 RNA NPH QL NAA+NON-PROBE: NOT DETECTED
HCOV OC43 RNA NPH QL NAA+NON-PROBE: NOT DETECTED
HCT VFR BLD AUTO: 33.4 % (ref 34–48)
HGB BLD-MCNC: 10.1 G/DL (ref 11.5–15.5)
HGB UR QL STRIP.AUTO: ABNORMAL
HMPV RNA NPH QL NAA+NON-PROBE: NOT DETECTED
HPIV1 RNA NPH QL NAA+NON-PROBE: NOT DETECTED
HPIV2 RNA NPH QL NAA+NON-PROBE: NOT DETECTED
HPIV3 RNA NPH QL NAA+NON-PROBE: NOT DETECTED
HPIV4 RNA NPH QL NAA+NON-PROBE: NOT DETECTED
IMM GRANULOCYTES # BLD AUTO: <0.03 K/UL (ref 0–0.58)
IMM GRANULOCYTES NFR BLD: 0 % (ref 0–5)
KETONES UR STRIP-MCNC: NEGATIVE MG/DL
LACTATE BLDV-SCNC: 0.7 MMOL/L (ref 0.5–2.2)
LEUKOCYTE ESTERASE UR QL STRIP: ABNORMAL
LIPASE SERPL-CCNC: 45 U/L (ref 13–60)
LYMPHOCYTES NFR BLD: 0.93 K/UL (ref 1.5–4)
LYMPHOCYTES RELATIVE PERCENT: 15 % (ref 20–42)
M PNEUMO DNA NPH QL NAA+NON-PROBE: NOT DETECTED
MCH RBC QN AUTO: 31.4 PG (ref 26–35)
MCHC RBC AUTO-ENTMCNC: 30.2 G/DL (ref 32–34.5)
MCV RBC AUTO: 103.7 FL (ref 80–99.9)
MONOCYTES NFR BLD: 0.42 K/UL (ref 0.1–0.95)
MONOCYTES NFR BLD: 7 % (ref 2–12)
NEUTROPHILS NFR BLD: 76 % (ref 43–80)
NEUTS SEG NFR BLD: 4.77 K/UL (ref 1.8–7.3)
NITRITE UR QL STRIP: NEGATIVE
PH UR STRIP: 6 [PH] (ref 5–8)
PLATELET # BLD AUTO: 290 K/UL (ref 130–450)
PMV BLD AUTO: 9.8 FL (ref 7–12)
POTASSIUM SERPL-SCNC: 4.6 MMOL/L (ref 3.5–5)
PROT SERPL-MCNC: 6.6 G/DL (ref 6.4–8.3)
PROT UR STRIP-MCNC: 30 MG/DL
RBC # BLD AUTO: 3.22 M/UL (ref 3.5–5.5)
RBC #/AREA URNS HPF: ABNORMAL /HPF
RSV RNA NPH QL NAA+NON-PROBE: NOT DETECTED
RV+EV RNA NPH QL NAA+NON-PROBE: NOT DETECTED
SARS-COV-2 RNA NPH QL NAA+NON-PROBE: NOT DETECTED
SODIUM SERPL-SCNC: 139 MMOL/L (ref 132–146)
SP GR UR STRIP: 1.01 (ref 1–1.03)
SPECIMEN DESCRIPTION: NORMAL
TROPONIN I SERPL HS-MCNC: 20 NG/L (ref 0–9)
TROPONIN I SERPL HS-MCNC: 20 NG/L (ref 0–9)
UROBILINOGEN UR STRIP-ACNC: 0.2 EU/DL (ref 0–1)
WBC #/AREA URNS HPF: ABNORMAL /HPF
WBC OTHER # BLD: 6.3 K/UL (ref 4.5–11.5)

## 2025-03-23 PROCEDURE — 6360000004 HC RX CONTRAST MEDICATION: Performed by: RADIOLOGY

## 2025-03-23 PROCEDURE — 74177 CT ABD & PELVIS W/CONTRAST: CPT

## 2025-03-23 PROCEDURE — 2140000000 HC CCU INTERMEDIATE R&B

## 2025-03-23 PROCEDURE — 96365 THER/PROPH/DIAG IV INF INIT: CPT

## 2025-03-23 PROCEDURE — 6370000000 HC RX 637 (ALT 250 FOR IP): Performed by: STUDENT IN AN ORGANIZED HEALTH CARE EDUCATION/TRAINING PROGRAM

## 2025-03-23 PROCEDURE — 6360000002 HC RX W HCPCS

## 2025-03-23 PROCEDURE — 99285 EMERGENCY DEPT VISIT HI MDM: CPT

## 2025-03-23 PROCEDURE — 87086 URINE CULTURE/COLONY COUNT: CPT

## 2025-03-23 PROCEDURE — 81001 URINALYSIS AUTO W/SCOPE: CPT

## 2025-03-23 PROCEDURE — 85025 COMPLETE CBC W/AUTO DIFF WBC: CPT

## 2025-03-23 PROCEDURE — 87040 BLOOD CULTURE FOR BACTERIA: CPT

## 2025-03-23 PROCEDURE — 83605 ASSAY OF LACTIC ACID: CPT

## 2025-03-23 PROCEDURE — 93005 ELECTROCARDIOGRAM TRACING: CPT

## 2025-03-23 PROCEDURE — 99223 1ST HOSP IP/OBS HIGH 75: CPT | Performed by: HOSPITALIST

## 2025-03-23 PROCEDURE — 94640 AIRWAY INHALATION TREATMENT: CPT

## 2025-03-23 PROCEDURE — 94664 DEMO&/EVAL PT USE INHALER: CPT

## 2025-03-23 PROCEDURE — 82962 GLUCOSE BLOOD TEST: CPT

## 2025-03-23 PROCEDURE — 80053 COMPREHEN METABOLIC PANEL: CPT

## 2025-03-23 PROCEDURE — 2500000003 HC RX 250 WO HCPCS

## 2025-03-23 PROCEDURE — 71045 X-RAY EXAM CHEST 1 VIEW: CPT

## 2025-03-23 PROCEDURE — 96375 TX/PRO/DX INJ NEW DRUG ADDON: CPT

## 2025-03-23 PROCEDURE — 0202U NFCT DS 22 TRGT SARS-COV-2: CPT

## 2025-03-23 PROCEDURE — 2580000003 HC RX 258

## 2025-03-23 PROCEDURE — 84484 ASSAY OF TROPONIN QUANT: CPT

## 2025-03-23 PROCEDURE — 83690 ASSAY OF LIPASE: CPT

## 2025-03-23 PROCEDURE — 83880 ASSAY OF NATRIURETIC PEPTIDE: CPT

## 2025-03-23 PROCEDURE — 6370000000 HC RX 637 (ALT 250 FOR IP)

## 2025-03-23 RX ORDER — IOPAMIDOL 755 MG/ML
75 INJECTION, SOLUTION INTRAVASCULAR
Status: COMPLETED | OUTPATIENT
Start: 2025-03-23 | End: 2025-03-23

## 2025-03-23 RX ORDER — INSULIN GLARGINE 100 [IU]/ML
10 INJECTION, SOLUTION SUBCUTANEOUS DAILY
Status: DISCONTINUED | OUTPATIENT
Start: 2025-03-24 | End: 2025-03-30 | Stop reason: HOSPADM

## 2025-03-23 RX ORDER — ONDANSETRON 2 MG/ML
4 INJECTION INTRAMUSCULAR; INTRAVENOUS EVERY 6 HOURS PRN
Status: DISCONTINUED | OUTPATIENT
Start: 2025-03-23 | End: 2025-03-23 | Stop reason: CLARIF

## 2025-03-23 RX ORDER — ASPIRIN 81 MG/1
81 TABLET ORAL DAILY
Status: DISCONTINUED | OUTPATIENT
Start: 2025-03-24 | End: 2025-03-30 | Stop reason: HOSPADM

## 2025-03-23 RX ORDER — MINOXIDIL 2.5 MG/1
2.5 TABLET ORAL 2 TIMES DAILY
Status: DISCONTINUED | OUTPATIENT
Start: 2025-03-23 | End: 2025-03-30 | Stop reason: HOSPADM

## 2025-03-23 RX ORDER — SODIUM CHLORIDE 0.9 % (FLUSH) 0.9 %
5-40 SYRINGE (ML) INJECTION PRN
Status: DISCONTINUED | OUTPATIENT
Start: 2025-03-23 | End: 2025-03-30 | Stop reason: HOSPADM

## 2025-03-23 RX ORDER — CYCLOBENZAPRINE HCL 10 MG
10 TABLET ORAL ONCE
Status: COMPLETED | OUTPATIENT
Start: 2025-03-23 | End: 2025-03-23

## 2025-03-23 RX ORDER — ALBUTEROL SULFATE 0.83 MG/ML
2.5 SOLUTION RESPIRATORY (INHALATION)
Status: DISCONTINUED | OUTPATIENT
Start: 2025-03-23 | End: 2025-03-30 | Stop reason: HOSPADM

## 2025-03-23 RX ORDER — HYDRALAZINE HYDROCHLORIDE 50 MG/1
100 TABLET, FILM COATED ORAL 2 TIMES DAILY
Status: DISCONTINUED | OUTPATIENT
Start: 2025-03-23 | End: 2025-03-30 | Stop reason: HOSPADM

## 2025-03-23 RX ORDER — PROCHLORPERAZINE MALEATE 10 MG
10 TABLET ORAL EVERY 8 HOURS PRN
Status: DISCONTINUED | OUTPATIENT
Start: 2025-03-23 | End: 2025-03-30 | Stop reason: HOSPADM

## 2025-03-23 RX ORDER — DEXTROSE MONOHYDRATE 100 MG/ML
INJECTION, SOLUTION INTRAVENOUS CONTINUOUS PRN
Status: DISCONTINUED | OUTPATIENT
Start: 2025-03-23 | End: 2025-03-30 | Stop reason: HOSPADM

## 2025-03-23 RX ORDER — PROCHLORPERAZINE EDISYLATE 5 MG/ML
10 INJECTION INTRAMUSCULAR; INTRAVENOUS EVERY 6 HOURS PRN
Status: DISCONTINUED | OUTPATIENT
Start: 2025-03-23 | End: 2025-03-30 | Stop reason: HOSPADM

## 2025-03-23 RX ORDER — POLYETHYLENE GLYCOL 3350 17 G/17G
17 POWDER, FOR SOLUTION ORAL DAILY PRN
Status: DISCONTINUED | OUTPATIENT
Start: 2025-03-23 | End: 2025-03-28

## 2025-03-23 RX ORDER — M-VIT,TX,IRON,MINS/CALC/FOLIC 27MG-0.4MG
1 TABLET ORAL DAILY
Status: DISCONTINUED | OUTPATIENT
Start: 2025-03-24 | End: 2025-03-30 | Stop reason: HOSPADM

## 2025-03-23 RX ORDER — SODIUM CHLORIDE 0.9 % (FLUSH) 0.9 %
5-40 SYRINGE (ML) INJECTION EVERY 12 HOURS SCHEDULED
Status: DISCONTINUED | OUTPATIENT
Start: 2025-03-23 | End: 2025-03-30 | Stop reason: HOSPADM

## 2025-03-23 RX ORDER — DICYCLOMINE HYDROCHLORIDE 10 MG/1
10 CAPSULE ORAL 2 TIMES DAILY
Status: DISCONTINUED | OUTPATIENT
Start: 2025-03-23 | End: 2025-03-30 | Stop reason: HOSPADM

## 2025-03-23 RX ORDER — ENOXAPARIN SODIUM 100 MG/ML
40 INJECTION SUBCUTANEOUS DAILY
Status: DISCONTINUED | OUTPATIENT
Start: 2025-03-24 | End: 2025-03-30 | Stop reason: HOSPADM

## 2025-03-23 RX ORDER — GUAIFENESIN/DEXTROMETHORPHAN 100-10MG/5
5 SYRUP ORAL EVERY 4 HOURS PRN
Status: DISCONTINUED | OUTPATIENT
Start: 2025-03-23 | End: 2025-03-30 | Stop reason: HOSPADM

## 2025-03-23 RX ORDER — PANTOPRAZOLE SODIUM 40 MG/1
40 TABLET, DELAYED RELEASE ORAL
Status: DISCONTINUED | OUTPATIENT
Start: 2025-03-24 | End: 2025-03-30 | Stop reason: HOSPADM

## 2025-03-23 RX ORDER — ISOSORBIDE MONONITRATE 30 MG/1
60 TABLET, EXTENDED RELEASE ORAL DAILY
Status: DISCONTINUED | OUTPATIENT
Start: 2025-03-24 | End: 2025-03-30 | Stop reason: HOSPADM

## 2025-03-23 RX ORDER — DULOXETIN HYDROCHLORIDE 60 MG/1
60 CAPSULE, DELAYED RELEASE ORAL DAILY
Status: DISCONTINUED | OUTPATIENT
Start: 2025-03-24 | End: 2025-03-30 | Stop reason: HOSPADM

## 2025-03-23 RX ORDER — PREDNISONE 20 MG/1
40 TABLET ORAL DAILY
Status: COMPLETED | OUTPATIENT
Start: 2025-03-26 | End: 2025-03-28

## 2025-03-23 RX ORDER — ONDANSETRON 4 MG/1
4 TABLET, ORALLY DISINTEGRATING ORAL EVERY 8 HOURS PRN
Status: DISCONTINUED | OUTPATIENT
Start: 2025-03-23 | End: 2025-03-23 | Stop reason: CLARIF

## 2025-03-23 RX ORDER — SODIUM CHLORIDE 9 MG/ML
INJECTION, SOLUTION INTRAVENOUS PRN
Status: DISCONTINUED | OUTPATIENT
Start: 2025-03-23 | End: 2025-03-30 | Stop reason: HOSPADM

## 2025-03-23 RX ORDER — IPRATROPIUM BROMIDE AND ALBUTEROL SULFATE 2.5; .5 MG/3ML; MG/3ML
1 SOLUTION RESPIRATORY (INHALATION)
Status: DISCONTINUED | OUTPATIENT
Start: 2025-03-23 | End: 2025-03-30 | Stop reason: HOSPADM

## 2025-03-23 RX ORDER — DIPHENHYDRAMINE HYDROCHLORIDE 50 MG/ML
50 INJECTION, SOLUTION INTRAMUSCULAR; INTRAVENOUS ONCE
Status: COMPLETED | OUTPATIENT
Start: 2025-03-23 | End: 2025-03-23

## 2025-03-23 RX ORDER — DOCUSATE SODIUM 100 MG/1
100 CAPSULE, LIQUID FILLED ORAL 2 TIMES DAILY PRN
Status: DISCONTINUED | OUTPATIENT
Start: 2025-03-23 | End: 2025-03-28

## 2025-03-23 RX ORDER — BUMETANIDE 0.25 MG/ML
1 INJECTION, SOLUTION INTRAMUSCULAR; INTRAVENOUS DAILY
Status: DISCONTINUED | OUTPATIENT
Start: 2025-03-24 | End: 2025-03-25

## 2025-03-23 RX ORDER — INSULIN LISPRO 100 [IU]/ML
0-4 INJECTION, SOLUTION INTRAVENOUS; SUBCUTANEOUS
Status: DISCONTINUED | OUTPATIENT
Start: 2025-03-23 | End: 2025-03-30 | Stop reason: HOSPADM

## 2025-03-23 RX ORDER — CETIRIZINE HYDROCHLORIDE 10 MG/1
10 TABLET ORAL DAILY
Status: DISCONTINUED | OUTPATIENT
Start: 2025-03-24 | End: 2025-03-30 | Stop reason: HOSPADM

## 2025-03-23 RX ORDER — LIDOCAINE 4 G/G
1 PATCH TOPICAL DAILY
Status: DISCONTINUED | OUTPATIENT
Start: 2025-03-23 | End: 2025-03-30 | Stop reason: HOSPADM

## 2025-03-23 RX ORDER — GLUCAGON 1 MG/ML
1 KIT INJECTION PRN
Status: DISCONTINUED | OUTPATIENT
Start: 2025-03-23 | End: 2025-03-30 | Stop reason: HOSPADM

## 2025-03-23 RX ORDER — ATORVASTATIN CALCIUM 10 MG/1
10 TABLET, FILM COATED ORAL DAILY
Status: DISCONTINUED | OUTPATIENT
Start: 2025-03-24 | End: 2025-03-30 | Stop reason: HOSPADM

## 2025-03-23 RX ORDER — IPRATROPIUM BROMIDE AND ALBUTEROL SULFATE 2.5; .5 MG/3ML; MG/3ML
3 SOLUTION RESPIRATORY (INHALATION) ONCE
Status: COMPLETED | OUTPATIENT
Start: 2025-03-23 | End: 2025-03-23

## 2025-03-23 RX ORDER — CHOLECALCIFEROL (VITAMIN D3) 50 MCG
2000 TABLET ORAL DAILY
Status: DISCONTINUED | OUTPATIENT
Start: 2025-03-24 | End: 2025-03-30 | Stop reason: HOSPADM

## 2025-03-23 RX ORDER — TRAZODONE HYDROCHLORIDE 50 MG/1
150 TABLET ORAL NIGHTLY
Status: DISCONTINUED | OUTPATIENT
Start: 2025-03-23 | End: 2025-03-30 | Stop reason: HOSPADM

## 2025-03-23 RX ADMIN — PANTOPRAZOLE SODIUM 80 MG: 40 INJECTION, POWDER, FOR SOLUTION INTRAVENOUS at 18:40

## 2025-03-23 RX ADMIN — DIPHENHYDRAMINE HYDROCHLORIDE 50 MG: 50 INJECTION INTRAMUSCULAR; INTRAVENOUS at 19:46

## 2025-03-23 RX ADMIN — IPRATROPIUM BROMIDE AND ALBUTEROL SULFATE 3 DOSE: 2.5; .5 SOLUTION RESPIRATORY (INHALATION) at 18:02

## 2025-03-23 RX ADMIN — WATER 2000 MG: 1 INJECTION INTRAMUSCULAR; INTRAVENOUS; SUBCUTANEOUS at 20:05

## 2025-03-23 RX ADMIN — WATER 125 MG: 1 INJECTION INTRAMUSCULAR; INTRAVENOUS; SUBCUTANEOUS at 19:46

## 2025-03-23 RX ADMIN — IOPAMIDOL 75 ML: 755 INJECTION, SOLUTION INTRAVENOUS at 20:54

## 2025-03-23 RX ADMIN — DOXYCYCLINE 100 MG: 100 INJECTION, POWDER, LYOPHILIZED, FOR SOLUTION INTRAVENOUS at 20:15

## 2025-03-23 RX ADMIN — CYCLOBENZAPRINE 10 MG: 10 TABLET, FILM COATED ORAL at 18:41

## 2025-03-23 ASSESSMENT — PAIN SCALES - GENERAL
PAINLEVEL_OUTOF10: 8
PAINLEVEL_OUTOF10: 8
PAINLEVEL_OUTOF10: 10

## 2025-03-23 ASSESSMENT — PAIN - FUNCTIONAL ASSESSMENT
PAIN_FUNCTIONAL_ASSESSMENT: 0-10
PAIN_FUNCTIONAL_ASSESSMENT: ACTIVITIES ARE NOT PREVENTED

## 2025-03-23 ASSESSMENT — PAIN DESCRIPTION - ORIENTATION
ORIENTATION: LOWER;RIGHT
ORIENTATION: LOWER

## 2025-03-23 ASSESSMENT — PAIN DESCRIPTION - LOCATION
LOCATION: ABDOMEN
LOCATION: ABDOMEN

## 2025-03-23 ASSESSMENT — PAIN DESCRIPTION - PAIN TYPE: TYPE: ACUTE PAIN

## 2025-03-23 ASSESSMENT — PAIN DESCRIPTION - ONSET: ONSET: ON-GOING

## 2025-03-23 ASSESSMENT — PAIN DESCRIPTION - DESCRIPTORS: DESCRIPTORS: DISCOMFORT;ACHING;SORE

## 2025-03-23 ASSESSMENT — PAIN DESCRIPTION - FREQUENCY: FREQUENCY: CONTINUOUS

## 2025-03-23 NOTE — ED NOTES
Patient arrived from EMS with no O2 and SPO2 was 86%. Patient typically on 3L at home. Patient placed on 3L NC SPO2 94%

## 2025-03-23 NOTE — ED PROVIDER NOTES
Department of Emergency Medicine     Written by: Wali Tucker DO  Patient Name: Steph Gonzáles  Admit Date: 3/23/2025  5:22 PM  MRN: [id number]                   : 1955      CHIEF COMPLAINT     Chief Complaint   Patient presents with    Abdominal Pain     RLQ pain with blood in stools     HPI     Steph Gonzáles is a 69 y.o. female who presents to the emergency department today complaining of abdominal pain.  Patient states over the last 3 days, she has noticed some blood in her stool.  She has been having some red as well as some black bowel movements.  She has been having some significant pain in the right lower quadrant of the abdomen that she says is \"hard\".  She also says she was diagnosed with pneumonia at [place] and discharged home.  She feels she is breathing harder than normal.  Patient wears 3 L of oxygen at home.  Patient denies any lightheadedness or dizziness, fever or chills, nausea or vomiting, chest pain, hematuria or dysuria, constipation or diarrhea.    Nursing notes were all reviewed and agreed with or any disagreements were addressed in the HPI.    REVIEW OF SYSTEMS:    Pertinent positives and negatives mentioned in the HPI/MDM.     REVIEW OF OUTSIDE RECORDS: Chart reviewed from 3/5/2025 office visit where patient was seen by primary care provider for follow-up from ER visit.    SOCIAL DETERMINANTS OF HEALTH: Patient has good medical compliance and fluency as well as established follow-up with family physician.    PAST HISTORY     I personally reviewed the following history:    Past Medical History:  has a past medical history of Acute kidney injury superimposed on CKD, Asthma, Atherosclerosis of native artery of left leg with rest pain (HCC), 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), Atrioventricular block, CAD (coronary artery disease), Cellulitis and abscess of trunk, Cerebellar infarct (HCC), Chronic back pain, Chronic

## 2025-03-24 ENCOUNTER — APPOINTMENT (OUTPATIENT)
Dept: CT IMAGING | Age: 70
DRG: 291 | End: 2025-03-24
Payer: MEDICARE

## 2025-03-24 ENCOUNTER — APPOINTMENT (OUTPATIENT)
Dept: CT IMAGING | Age: 70
DRG: 291 | End: 2025-03-24
Attending: HOSPITALIST
Payer: MEDICARE

## 2025-03-24 LAB
ANION GAP SERPL CALCULATED.3IONS-SCNC: 14 MMOL/L (ref 7–16)
BASOPHILS # BLD: 0 K/UL (ref 0–0.2)
BASOPHILS NFR BLD: 0 % (ref 0–2)
BUN SERPL-MCNC: 25 MG/DL (ref 6–23)
CALCIUM SERPL-MCNC: 8.7 MG/DL (ref 8.6–10.2)
CHLORIDE SERPL-SCNC: 102 MMOL/L (ref 98–107)
CO2 SERPL-SCNC: 21 MMOL/L (ref 22–29)
CREAT SERPL-MCNC: 1.6 MG/DL (ref 0.5–1)
EKG ATRIAL RATE: 91 BPM
EKG P AXIS: 67 DEGREES
EKG P-R INTERVAL: 178 MS
EKG Q-T INTERVAL: 434 MS
EKG QRS DURATION: 130 MS
EKG QTC CALCULATION (BAZETT): 533 MS
EKG R AXIS: 39 DEGREES
EKG T AXIS: 71 DEGREES
EKG VENTRICULAR RATE: 91 BPM
EOSINOPHIL # BLD: 0 K/UL (ref 0.05–0.5)
EOSINOPHILS RELATIVE PERCENT: 0 % (ref 0–6)
ERYTHROCYTE [DISTWIDTH] IN BLOOD BY AUTOMATED COUNT: 12.6 % (ref 11.5–15)
GFR, ESTIMATED: 34 ML/MIN/1.73M2
GLUCOSE BLD-MCNC: 179 MG/DL (ref 74–99)
GLUCOSE BLD-MCNC: 209 MG/DL (ref 74–99)
GLUCOSE BLD-MCNC: 241 MG/DL (ref 74–99)
GLUCOSE BLD-MCNC: 268 MG/DL (ref 74–99)
GLUCOSE SERPL-MCNC: 248 MG/DL (ref 74–99)
HBA1C MFR BLD: 5.9 % (ref 4–5.6)
HCT VFR BLD AUTO: 34.3 % (ref 34–48)
HGB BLD-MCNC: 10.7 G/DL (ref 11.5–15.5)
IMM GRANULOCYTES # BLD AUTO: <0.03 K/UL (ref 0–0.58)
IMM GRANULOCYTES NFR BLD: 0 % (ref 0–5)
LYMPHOCYTES NFR BLD: 0.24 K/UL (ref 1.5–4)
LYMPHOCYTES RELATIVE PERCENT: 4 % (ref 20–42)
MCH RBC QN AUTO: 31.8 PG (ref 26–35)
MCHC RBC AUTO-ENTMCNC: 31.2 G/DL (ref 32–34.5)
MCV RBC AUTO: 101.8 FL (ref 80–99.9)
MONOCYTES NFR BLD: 0.03 K/UL (ref 0.1–0.95)
MONOCYTES NFR BLD: 1 % (ref 2–12)
NEUTROPHILS NFR BLD: 96 % (ref 43–80)
NEUTS SEG NFR BLD: 5.99 K/UL (ref 1.8–7.3)
PLATELET # BLD AUTO: 275 K/UL (ref 130–450)
PMV BLD AUTO: 10.1 FL (ref 7–12)
POTASSIUM SERPL-SCNC: 5.2 MMOL/L (ref 3.5–5)
RBC # BLD AUTO: 3.37 M/UL (ref 3.5–5.5)
RBC # BLD: ABNORMAL 10*6/UL
SODIUM SERPL-SCNC: 137 MMOL/L (ref 132–146)
WBC OTHER # BLD: 6.3 K/UL (ref 4.5–11.5)

## 2025-03-24 PROCEDURE — 6360000002 HC RX W HCPCS: Performed by: HOSPITALIST

## 2025-03-24 PROCEDURE — 82962 GLUCOSE BLOOD TEST: CPT

## 2025-03-24 PROCEDURE — 94640 AIRWAY INHALATION TREATMENT: CPT

## 2025-03-24 PROCEDURE — 6370000000 HC RX 637 (ALT 250 FOR IP): Performed by: NURSE PRACTITIONER

## 2025-03-24 PROCEDURE — 83036 HEMOGLOBIN GLYCOSYLATED A1C: CPT

## 2025-03-24 PROCEDURE — 6370000000 HC RX 637 (ALT 250 FOR IP): Performed by: HOSPITALIST

## 2025-03-24 PROCEDURE — 2140000000 HC CCU INTERMEDIATE R&B

## 2025-03-24 PROCEDURE — 93010 ELECTROCARDIOGRAM REPORT: CPT | Performed by: INTERNAL MEDICINE

## 2025-03-24 PROCEDURE — 2500000003 HC RX 250 WO HCPCS: Performed by: INTERNAL MEDICINE

## 2025-03-24 PROCEDURE — 2580000003 HC RX 258: Performed by: INTERNAL MEDICINE

## 2025-03-24 PROCEDURE — 94660 CPAP INITIATION&MGMT: CPT

## 2025-03-24 PROCEDURE — 99222 1ST HOSP IP/OBS MODERATE 55: CPT | Performed by: NURSE PRACTITIONER

## 2025-03-24 PROCEDURE — 80048 BASIC METABOLIC PNL TOTAL CA: CPT

## 2025-03-24 PROCEDURE — 2500000003 HC RX 250 WO HCPCS: Performed by: HOSPITALIST

## 2025-03-24 PROCEDURE — 70450 CT HEAD/BRAIN W/O DYE: CPT

## 2025-03-24 PROCEDURE — 99222 1ST HOSP IP/OBS MODERATE 55: CPT | Performed by: OTOLARYNGOLOGY

## 2025-03-24 PROCEDURE — 85025 COMPLETE CBC W/AUTO DIFF WBC: CPT

## 2025-03-24 PROCEDURE — 99232 SBSQ HOSP IP/OBS MODERATE 35: CPT | Performed by: INTERNAL MEDICINE

## 2025-03-24 PROCEDURE — 6370000000 HC RX 637 (ALT 250 FOR IP): Performed by: INTERNAL MEDICINE

## 2025-03-24 RX ORDER — FLUTICASONE PROPIONATE 50 MCG
2 SPRAY, SUSPENSION (ML) NASAL DAILY
Status: DISCONTINUED | OUTPATIENT
Start: 2025-03-24 | End: 2025-03-28

## 2025-03-24 RX ORDER — NICOTINE 21 MG/24HR
1 PATCH, TRANSDERMAL 24 HOURS TRANSDERMAL DAILY
Status: DISCONTINUED | OUTPATIENT
Start: 2025-03-24 | End: 2025-03-30 | Stop reason: HOSPADM

## 2025-03-24 RX ORDER — HYDROCODONE BITARTRATE AND ACETAMINOPHEN 5; 325 MG/1; MG/1
1 TABLET ORAL EVERY 6 HOURS PRN
Status: COMPLETED | OUTPATIENT
Start: 2025-03-24 | End: 2025-03-24

## 2025-03-24 RX ADMIN — GUAIFENESIN SYRUP AND DEXTROMETHORPHAN 5 ML: 100; 10 SYRUP ORAL at 00:04

## 2025-03-24 RX ADMIN — DULOXETINE HYDROCHLORIDE 60 MG: 60 CAPSULE, DELAYED RELEASE ORAL at 09:24

## 2025-03-24 RX ADMIN — BUMETANIDE 1 MG: 0.25 INJECTION INTRAMUSCULAR; INTRAVENOUS at 10:56

## 2025-03-24 RX ADMIN — IPRATROPIUM BROMIDE AND ALBUTEROL SULFATE 1 DOSE: 2.5; .5 SOLUTION RESPIRATORY (INHALATION) at 16:38

## 2025-03-24 RX ADMIN — MINOXIDIL 2.5 MG: 2.5 TABLET ORAL at 22:05

## 2025-03-24 RX ADMIN — SODIUM CHLORIDE, PRESERVATIVE FREE 10 ML: 5 INJECTION INTRAVENOUS at 21:41

## 2025-03-24 RX ADMIN — TIZANIDINE 4 MG: 4 TABLET ORAL at 00:04

## 2025-03-24 RX ADMIN — INSULIN LISPRO 1 UNITS: 100 INJECTION, SOLUTION INTRAVENOUS; SUBCUTANEOUS at 06:11

## 2025-03-24 RX ADMIN — ENOXAPARIN SODIUM 40 MG: 100 INJECTION SUBCUTANEOUS at 09:25

## 2025-03-24 RX ADMIN — PANTOPRAZOLE SODIUM 40 MG: 40 TABLET, DELAYED RELEASE ORAL at 06:12

## 2025-03-24 RX ADMIN — DOXYCYCLINE 100 MG: 100 INJECTION, POWDER, LYOPHILIZED, FOR SOLUTION INTRAVENOUS at 11:07

## 2025-03-24 RX ADMIN — IPRATROPIUM BROMIDE AND ALBUTEROL SULFATE 1 DOSE: 2.5; .5 SOLUTION RESPIRATORY (INHALATION) at 10:52

## 2025-03-24 RX ADMIN — HYDROCODONE BITARTRATE AND ACETAMINOPHEN 1 TABLET: 5; 325 TABLET ORAL at 10:55

## 2025-03-24 RX ADMIN — IPRATROPIUM BROMIDE AND ALBUTEROL SULFATE 1 DOSE: 2.5; .5 SOLUTION RESPIRATORY (INHALATION) at 20:33

## 2025-03-24 RX ADMIN — SACUBITRIL AND VALSARTAN 1 TABLET: 49; 51 TABLET, FILM COATED ORAL at 00:04

## 2025-03-24 RX ADMIN — ATORVASTATIN CALCIUM 10 MG: 10 TABLET, FILM COATED ORAL at 10:56

## 2025-03-24 RX ADMIN — WATER 1000 MG: 1 INJECTION INTRAMUSCULAR; INTRAVENOUS; SUBCUTANEOUS at 09:25

## 2025-03-24 RX ADMIN — Medication 2000 UNITS: at 10:56

## 2025-03-24 RX ADMIN — IPRATROPIUM BROMIDE AND ALBUTEROL SULFATE 1 DOSE: 2.5; .5 SOLUTION RESPIRATORY (INHALATION) at 06:32

## 2025-03-24 RX ADMIN — TRAZODONE HYDROCHLORIDE 150 MG: 50 TABLET ORAL at 00:04

## 2025-03-24 RX ADMIN — ARFORMOTEROL TARTRATE: 15 SOLUTION RESPIRATORY (INHALATION) at 20:33

## 2025-03-24 RX ADMIN — SODIUM CHLORIDE, PRESERVATIVE FREE 10 ML: 5 INJECTION INTRAVENOUS at 09:36

## 2025-03-24 RX ADMIN — SODIUM CHLORIDE, PRESERVATIVE FREE 10 ML: 5 INJECTION INTRAVENOUS at 02:35

## 2025-03-24 RX ADMIN — OXYBUTYNIN CHLORIDE 15 MG: 5 TABLET, EXTENDED RELEASE ORAL at 22:05

## 2025-03-24 RX ADMIN — TRAZODONE HYDROCHLORIDE 150 MG: 50 TABLET ORAL at 21:43

## 2025-03-24 RX ADMIN — WATER 40 MG: 1 INJECTION INTRAMUSCULAR; INTRAVENOUS; SUBCUTANEOUS at 14:23

## 2025-03-24 RX ADMIN — DICYCLOMINE HYDROCHLORIDE 10 MG: 10 CAPSULE ORAL at 09:25

## 2025-03-24 RX ADMIN — INSULIN GLARGINE 10 UNITS: 100 INJECTION, SOLUTION SUBCUTANEOUS at 11:00

## 2025-03-24 RX ADMIN — ASPIRIN 81 MG: 81 TABLET, COATED ORAL at 09:25

## 2025-03-24 RX ADMIN — CETIRIZINE HYDROCHLORIDE 10 MG: 10 TABLET, FILM COATED ORAL at 09:25

## 2025-03-24 RX ADMIN — SACUBITRIL AND VALSARTAN 1 TABLET: 49; 51 TABLET, FILM COATED ORAL at 09:25

## 2025-03-24 RX ADMIN — HYDRALAZINE HYDROCHLORIDE 100 MG: 50 TABLET ORAL at 00:04

## 2025-03-24 RX ADMIN — SODIUM ZIRCONIUM CYCLOSILICATE 10 G: 10 POWDER, FOR SUSPENSION ORAL at 06:11

## 2025-03-24 RX ADMIN — HYDRALAZINE HYDROCHLORIDE 100 MG: 50 TABLET ORAL at 21:42

## 2025-03-24 RX ADMIN — DOXYCYCLINE 100 MG: 100 INJECTION, POWDER, LYOPHILIZED, FOR SOLUTION INTRAVENOUS at 21:44

## 2025-03-24 RX ADMIN — Medication 1 TABLET: at 09:25

## 2025-03-24 RX ADMIN — ARFORMOTEROL TARTRATE: 15 SOLUTION RESPIRATORY (INHALATION) at 06:32

## 2025-03-24 RX ADMIN — WATER 40 MG: 1 INJECTION INTRAMUSCULAR; INTRAVENOUS; SUBCUTANEOUS at 06:11

## 2025-03-24 RX ADMIN — INSULIN LISPRO 2 UNITS: 100 INJECTION, SOLUTION INTRAVENOUS; SUBCUTANEOUS at 21:43

## 2025-03-24 RX ADMIN — WATER 40 MG: 1 INJECTION INTRAMUSCULAR; INTRAVENOUS; SUBCUTANEOUS at 21:41

## 2025-03-24 RX ADMIN — ISOSORBIDE MONONITRATE 60 MG: 30 TABLET, EXTENDED RELEASE ORAL at 10:56

## 2025-03-24 RX ADMIN — DICYCLOMINE HYDROCHLORIDE 10 MG: 10 CAPSULE ORAL at 21:42

## 2025-03-24 RX ADMIN — SACUBITRIL AND VALSARTAN 1 TABLET: 49; 51 TABLET, FILM COATED ORAL at 21:42

## 2025-03-24 RX ADMIN — TIZANIDINE 4 MG: 4 TABLET ORAL at 21:42

## 2025-03-24 RX ADMIN — HYDRALAZINE HYDROCHLORIDE 100 MG: 50 TABLET ORAL at 09:24

## 2025-03-24 RX ADMIN — HYDROCODONE BITARTRATE AND ACETAMINOPHEN 1 TABLET: 5; 325 TABLET ORAL at 04:13

## 2025-03-24 RX ADMIN — DICYCLOMINE HYDROCHLORIDE 10 MG: 10 CAPSULE ORAL at 00:04

## 2025-03-24 ASSESSMENT — PAIN SCALES - GENERAL
PAINLEVEL_OUTOF10: 10
PAINLEVEL_OUTOF10: 0
PAINLEVEL_OUTOF10: 10
PAINLEVEL_OUTOF10: 3
PAINLEVEL_OUTOF10: 6
PAINLEVEL_OUTOF10: 0

## 2025-03-24 ASSESSMENT — PAIN DESCRIPTION - LOCATION
LOCATION: ABDOMEN
LOCATION: RECTUM;ABDOMEN

## 2025-03-24 ASSESSMENT — LIFESTYLE VARIABLES: HOW OFTEN DO YOU HAVE A DRINK CONTAINING ALCOHOL: NEVER

## 2025-03-24 ASSESSMENT — PAIN DESCRIPTION - DESCRIPTORS: DESCRIPTORS: SHARP;SHOOTING;DISCOMFORT

## 2025-03-24 NOTE — PLAN OF CARE
Problem: Skin/Tissue Integrity  Goal: Skin integrity remains intact  Description: 1.  Monitor for areas of redness and/or skin breakdown  2.  Assess vascular access sites hourly  3.  Every 4-6 hours minimum:  Change oxygen saturation probe site  4.  Every 4-6 hours:  If on nasal continuous positive airway pressure, respiratory therapy assess nares and determine need for appliance change or resting period  Outcome: Progressing     Problem: Safety - Adult  Goal: Free from fall injury  Outcome: Progressing     Problem: Chronic Conditions and Co-morbidities  Goal: Patient's chronic conditions and co-morbidity symptoms are monitored and maintained or improved  Outcome: Progressing     Problem: Discharge Planning  Goal: Discharge to home or other facility with appropriate resources  Outcome: Progressing     Problem: Pain  Goal: Verbalizes/displays adequate comfort level or baseline comfort level  Outcome: Progressing     Problem: ABCDS Injury Assessment  Goal: Absence of physical injury  Outcome: Progressing

## 2025-03-24 NOTE — H&P
of the left anterior chest wall, unchanged.     1. Linear and ground-glass densities in the posterior upper lobes and bilateral lower lobes. This may be due to atelectasis or pneumonia. 2. Cardiomegaly. 3. Small hiatal hernia. 4. Patulous proximal esophagus with small volume of debris posteriorly at the level of the aortic arch. 5. Left renal atrophy, not significantly changed.     XR CHEST (2 VW)  Result Date: 2/24/2025  EXAMINATION: TWO XRAY VIEWS OF THE CHEST 2/24/2025 12:31 pm COMPARISON: 02/19/2025, 06/22/2024 HISTORY: ORDERING SYSTEM PROVIDED HISTORY: cough TECHNOLOGIST PROVIDED HISTORY: Reason for exam:->cough FINDINGS: Moderate generalized cardiomegaly and evidence of prior median sternotomy. There appears to be a loop recorder projected over the left chest wall. Minimal ground-glass opacity in the lung bases similar to the prior study could reflect pulmonary edema versus pneumonia.  No pneumothorax.  Stable osseous structures.  No pleural fluid detected on lateral views.     1. Ground-glass opacity in the right lower lobe could reflect asymmetric pulmonary edema or pneumonia in the proper clinical setting. 2. Moderate generalized cardiomegaly and evidence of prior median sternotomy.       ASSESSMENT & PLAN::    Principal Problem:    Acute on chronic respiratory failure with hypoxia (HCC)  Active Problems:    Coronary artery disease involving native coronary artery of native heart without angina pectoris    DAYAN and COPD overlap syndrome ([facility])    O2 dependent    CKD (chronic kidney disease) stage 3, GFR 30-59 ml/min (HCC)    Uncontrolled type 2 diabetes mellitus with hyperglycemia, with long-term current use of insulin ([facility])    Chronic obstructive pulmonary disease (HCC)    Class 2 severe obesity with serious comorbidity and body mass index (BMI) of 35.0 to 35.9 in adult  Resolved Problems:    * No resolved hospital problems. *      - I have discussed the initial assessment and plan with ED provider, patient

## 2025-03-24 NOTE — TELEPHONE ENCOUNTER
Name of Medication(s) Requested:  linaclotide (LINZESS) capsule [address] [facility] is asking for patient to supply this medication.    Medication is on current medication list Yes    Dosage and directions were verified? Yes    Quantity verified: 90 day supply     Pharmacy Verified?  Yes - [facility] [address]    Last Appointment:  3/5/2025    Future appts:  Future Appointments   Date Time Provider Department Center   4/12/2025 10:00 AM SEB CT2 BD SEBZ CT SEB Radiolog   4/14/2025 12:00 PM [facility] CHF ROOM 2 [facility] HOD   6/4/2025  9:40 AM Danielle Morrison, PATRIZIA - NP BDM ENDO [facility]   11/13/2025  1:45 PM Pino Hernandez MD VAS/MED [facility]   2/24/2026 10:15 AM Ulises Skinner DO [facility] ECC DEP        (If no appt send self scheduling link. .REFILLAPPT)  Scheduling request sent?     [] Yes  [] No    Does patient need updated?  [] Yes  [] No+

## 2025-03-24 NOTE — CARE COORDINATION
Social Work /Transition of Care:    Pt presented to the ED on 3/23 secondary to rectal bleeding, shortness of breath,  and abdominal pain from home.    Pt is admitted inpatient with acute on chronic respiratory failure with hypoxia.  Pt is currently on 5L oxygen and reports she has home oxygen through [employer] (3.5L) while sleeping.    Pt reports she and her sister, Kristen, live in a one floor home.  Pt has a cane, walker, and nebulizer at home.  Pt reports she is in need of an elevated toilet seat.  Pt reports no recent falls.  Pt reports hx with [facility] and at [facility].  Pt reports she plans to return home upon discharge and if [facility] orders are written, she would like referral to [facility].  MISSY made initial referral to Ceci with [facility].    MISSY/HAILEE will follow.

## 2025-03-25 ENCOUNTER — APPOINTMENT (OUTPATIENT)
Dept: CT IMAGING | Age: 70
DRG: 291 | End: 2025-03-25
Payer: MEDICARE

## 2025-03-25 LAB
ANION GAP SERPL CALCULATED.3IONS-SCNC: 10 MMOL/L (ref 7–16)
ANION GAP SERPL CALCULATED.3IONS-SCNC: 14 MMOL/L (ref 7–16)
BASOPHILS # BLD: 0 K/UL (ref 0–0.2)
BASOPHILS NFR BLD: 0 % (ref 0–2)
BUN SERPL-MCNC: 28 MG/DL (ref 6–23)
BUN SERPL-MCNC: 28 MG/DL (ref 6–23)
CALCIUM SERPL-MCNC: 8.4 MG/DL (ref 8.6–10.2)
CALCIUM SERPL-MCNC: 8.6 MG/DL (ref 8.6–10.2)
CHLORIDE SERPL-SCNC: 100 MMOL/L (ref 98–107)
CHLORIDE SERPL-SCNC: 103 MMOL/L (ref 98–107)
CO2 SERPL-SCNC: 18 MMOL/L (ref 22–29)
CO2 SERPL-SCNC: 25 MMOL/L (ref 22–29)
CREAT SERPL-MCNC: 1.6 MG/DL (ref 0.5–1)
CREAT SERPL-MCNC: 1.7 MG/DL (ref 0.5–1)
CRP SERPL HS-MCNC: <3 MG/L (ref 0–5)
EOSINOPHIL # BLD: 0 K/UL (ref 0.05–0.5)
EOSINOPHILS RELATIVE PERCENT: 0 % (ref 0–6)
ERYTHROCYTE [DISTWIDTH] IN BLOOD BY AUTOMATED COUNT: 12.8 % (ref 11.5–15)
GFR, ESTIMATED: 31 ML/MIN/1.73M2
GFR, ESTIMATED: 34 ML/MIN/1.73M2
GLUCOSE BLD-MCNC: 151 MG/DL (ref 74–99)
GLUCOSE BLD-MCNC: 173 MG/DL (ref 74–99)
GLUCOSE BLD-MCNC: 179 MG/DL (ref 74–99)
GLUCOSE BLD-MCNC: 181 MG/DL (ref 74–99)
GLUCOSE SERPL-MCNC: 183 MG/DL (ref 74–99)
GLUCOSE SERPL-MCNC: 196 MG/DL (ref 74–99)
HCT VFR BLD AUTO: 33.7 % (ref 34–48)
HGB BLD-MCNC: 10.5 G/DL (ref 11.5–15.5)
IMM GRANULOCYTES # BLD AUTO: 0.05 K/UL (ref 0–0.58)
IMM GRANULOCYTES NFR BLD: 1 % (ref 0–5)
LYMPHOCYTES NFR BLD: 0.35 K/UL (ref 1.5–4)
LYMPHOCYTES RELATIVE PERCENT: 4 % (ref 20–42)
MCH RBC QN AUTO: 31.6 PG (ref 26–35)
MCHC RBC AUTO-ENTMCNC: 31.2 G/DL (ref 32–34.5)
MCV RBC AUTO: 101.5 FL (ref 80–99.9)
MONOCYTES NFR BLD: 0.31 K/UL (ref 0.1–0.95)
MONOCYTES NFR BLD: 3 % (ref 2–12)
NEUTROPHILS NFR BLD: 93 % (ref 43–80)
NEUTS SEG NFR BLD: 8.83 K/UL (ref 1.8–7.3)
PLATELET # BLD AUTO: 294 K/UL (ref 130–450)
PMV BLD AUTO: 10.1 FL (ref 7–12)
POTASSIUM SERPL-SCNC: 5.5 MMOL/L (ref 3.5–5)
POTASSIUM SERPL-SCNC: 5.8 MMOL/L (ref 3.5–5)
PROCALCITONIN SERPL-MCNC: 0.04 NG/ML (ref 0–0.08)
RBC # BLD AUTO: 3.32 M/UL (ref 3.5–5.5)
RBC # BLD: ABNORMAL 10*6/UL
SODIUM SERPL-SCNC: 135 MMOL/L (ref 132–146)
SODIUM SERPL-SCNC: 135 MMOL/L (ref 132–146)
TROPONIN I SERPL HS-MCNC: 21 NG/L (ref 0–9)
WBC OTHER # BLD: 9.5 K/UL (ref 4.5–11.5)

## 2025-03-25 PROCEDURE — 71250 CT THORAX DX C-: CPT

## 2025-03-25 PROCEDURE — 85025 COMPLETE CBC W/AUTO DIFF WBC: CPT

## 2025-03-25 PROCEDURE — 2500000003 HC RX 250 WO HCPCS: Performed by: HOSPITALIST

## 2025-03-25 PROCEDURE — 2140000000 HC CCU INTERMEDIATE R&B

## 2025-03-25 PROCEDURE — 99232 SBSQ HOSP IP/OBS MODERATE 35: CPT | Performed by: STUDENT IN AN ORGANIZED HEALTH CARE EDUCATION/TRAINING PROGRAM

## 2025-03-25 PROCEDURE — 94669 MECHANICAL CHEST WALL OSCILL: CPT

## 2025-03-25 PROCEDURE — 6370000000 HC RX 637 (ALT 250 FOR IP): Performed by: INTERNAL MEDICINE

## 2025-03-25 PROCEDURE — 84145 PROCALCITONIN (PCT): CPT

## 2025-03-25 PROCEDURE — 87899 AGENT NOS ASSAY W/OPTIC: CPT

## 2025-03-25 PROCEDURE — 2500000003 HC RX 250 WO HCPCS: Performed by: INTERNAL MEDICINE

## 2025-03-25 PROCEDURE — 97535 SELF CARE MNGMENT TRAINING: CPT

## 2025-03-25 PROCEDURE — 80048 BASIC METABOLIC PNL TOTAL CA: CPT

## 2025-03-25 PROCEDURE — 94640 AIRWAY INHALATION TREATMENT: CPT

## 2025-03-25 PROCEDURE — 6370000000 HC RX 637 (ALT 250 FOR IP): Performed by: HOSPITALIST

## 2025-03-25 PROCEDURE — 2580000003 HC RX 258: Performed by: INTERNAL MEDICINE

## 2025-03-25 PROCEDURE — 82962 GLUCOSE BLOOD TEST: CPT

## 2025-03-25 PROCEDURE — 70490 CT SOFT TISSUE NECK W/O DYE: CPT

## 2025-03-25 PROCEDURE — 84484 ASSAY OF TROPONIN QUANT: CPT

## 2025-03-25 PROCEDURE — 6360000002 HC RX W HCPCS: Performed by: HOSPITALIST

## 2025-03-25 PROCEDURE — 97161 PT EVAL LOW COMPLEX 20 MIN: CPT

## 2025-03-25 PROCEDURE — 97165 OT EVAL LOW COMPLEX 30 MIN: CPT

## 2025-03-25 PROCEDURE — APPSS60 APP SPLIT SHARED TIME 46-60 MINUTES

## 2025-03-25 PROCEDURE — 87081 CULTURE SCREEN ONLY: CPT

## 2025-03-25 PROCEDURE — 36415 COLL VENOUS BLD VENIPUNCTURE: CPT

## 2025-03-25 PROCEDURE — 97530 THERAPEUTIC ACTIVITIES: CPT

## 2025-03-25 PROCEDURE — 6370000000 HC RX 637 (ALT 250 FOR IP): Performed by: STUDENT IN AN ORGANIZED HEALTH CARE EDUCATION/TRAINING PROGRAM

## 2025-03-25 PROCEDURE — 86140 C-REACTIVE PROTEIN: CPT

## 2025-03-25 RX ORDER — TRAMADOL HYDROCHLORIDE 50 MG/1
50 TABLET ORAL EVERY 6 HOURS PRN
Status: DISCONTINUED | OUTPATIENT
Start: 2025-03-25 | End: 2025-03-30 | Stop reason: HOSPADM

## 2025-03-25 RX ORDER — LINACLOTIDE 290 UG/1
290 CAPSULE, GELATIN COATED ORAL
Qty: 30 CAPSULE | Refills: 0 | Status: SHIPPED | OUTPATIENT
Start: 2025-03-25

## 2025-03-25 RX ORDER — BUMETANIDE 1 MG/1
1 TABLET ORAL DAILY
Status: DISCONTINUED | OUTPATIENT
Start: 2025-03-26 | End: 2025-03-30 | Stop reason: HOSPADM

## 2025-03-25 RX ADMIN — CETIRIZINE HYDROCHLORIDE 10 MG: 10 TABLET, FILM COATED ORAL at 09:50

## 2025-03-25 RX ADMIN — SODIUM CHLORIDE, PRESERVATIVE FREE 10 ML: 5 INJECTION INTRAVENOUS at 09:54

## 2025-03-25 RX ADMIN — BUMETANIDE 1 MG: 0.25 INJECTION INTRAMUSCULAR; INTRAVENOUS at 09:49

## 2025-03-25 RX ADMIN — ISOSORBIDE MONONITRATE 60 MG: 30 TABLET, EXTENDED RELEASE ORAL at 09:50

## 2025-03-25 RX ADMIN — MINOXIDIL 2.5 MG: 2.5 TABLET ORAL at 09:53

## 2025-03-25 RX ADMIN — Medication 1 TABLET: at 09:50

## 2025-03-25 RX ADMIN — ARFORMOTEROL TARTRATE: 15 SOLUTION RESPIRATORY (INHALATION) at 21:21

## 2025-03-25 RX ADMIN — SODIUM CHLORIDE, PRESERVATIVE FREE 10 ML: 5 INJECTION INTRAVENOUS at 21:05

## 2025-03-25 RX ADMIN — PANTOPRAZOLE SODIUM 40 MG: 40 TABLET, DELAYED RELEASE ORAL at 05:50

## 2025-03-25 RX ADMIN — WATER 1000 MG: 1 INJECTION INTRAMUSCULAR; INTRAVENOUS; SUBCUTANEOUS at 09:49

## 2025-03-25 RX ADMIN — IPRATROPIUM BROMIDE AND ALBUTEROL SULFATE 1 DOSE: 2.5; .5 SOLUTION RESPIRATORY (INHALATION) at 08:30

## 2025-03-25 RX ADMIN — WATER 40 MG: 1 INJECTION INTRAMUSCULAR; INTRAVENOUS; SUBCUTANEOUS at 21:03

## 2025-03-25 RX ADMIN — WATER 40 MG: 1 INJECTION INTRAMUSCULAR; INTRAVENOUS; SUBCUTANEOUS at 14:05

## 2025-03-25 RX ADMIN — SACUBITRIL AND VALSARTAN 1 TABLET: 49; 51 TABLET, FILM COATED ORAL at 09:50

## 2025-03-25 RX ADMIN — IPRATROPIUM BROMIDE AND ALBUTEROL SULFATE 1 DOSE: 2.5; .5 SOLUTION RESPIRATORY (INHALATION) at 16:04

## 2025-03-25 RX ADMIN — HYDRALAZINE HYDROCHLORIDE 100 MG: 50 TABLET ORAL at 09:50

## 2025-03-25 RX ADMIN — ARFORMOTEROL TARTRATE: 15 SOLUTION RESPIRATORY (INHALATION) at 08:30

## 2025-03-25 RX ADMIN — INSULIN GLARGINE 10 UNITS: 100 INJECTION, SOLUTION SUBCUTANEOUS at 09:48

## 2025-03-25 RX ADMIN — TRAZODONE HYDROCHLORIDE 150 MG: 50 TABLET ORAL at 21:02

## 2025-03-25 RX ADMIN — SODIUM ZIRCONIUM CYCLOSILICATE 10 G: 10 POWDER, FOR SUSPENSION ORAL at 11:57

## 2025-03-25 RX ADMIN — MINOXIDIL 2.5 MG: 2.5 TABLET ORAL at 21:03

## 2025-03-25 RX ADMIN — DOXYCYCLINE 100 MG: 100 INJECTION, POWDER, LYOPHILIZED, FOR SOLUTION INTRAVENOUS at 10:02

## 2025-03-25 RX ADMIN — INSULIN LISPRO 1 UNITS: 100 INJECTION, SOLUTION INTRAVENOUS; SUBCUTANEOUS at 21:04

## 2025-03-25 RX ADMIN — HYDRALAZINE HYDROCHLORIDE 100 MG: 50 TABLET ORAL at 21:03

## 2025-03-25 RX ADMIN — OXYBUTYNIN CHLORIDE 15 MG: 5 TABLET, EXTENDED RELEASE ORAL at 21:02

## 2025-03-25 RX ADMIN — DICYCLOMINE HYDROCHLORIDE 10 MG: 10 CAPSULE ORAL at 09:50

## 2025-03-25 RX ADMIN — DICYCLOMINE HYDROCHLORIDE 10 MG: 10 CAPSULE ORAL at 21:02

## 2025-03-25 RX ADMIN — TIZANIDINE 4 MG: 4 TABLET ORAL at 21:03

## 2025-03-25 RX ADMIN — WATER 40 MG: 1 INJECTION INTRAMUSCULAR; INTRAVENOUS; SUBCUTANEOUS at 05:50

## 2025-03-25 RX ADMIN — DOXYCYCLINE 100 MG: 100 INJECTION, POWDER, LYOPHILIZED, FOR SOLUTION INTRAVENOUS at 21:08

## 2025-03-25 RX ADMIN — TRAMADOL HYDROCHLORIDE 50 MG: 50 TABLET, COATED ORAL at 14:04

## 2025-03-25 RX ADMIN — DULOXETINE HYDROCHLORIDE 60 MG: 60 CAPSULE, DELAYED RELEASE ORAL at 09:50

## 2025-03-25 RX ADMIN — SACUBITRIL AND VALSARTAN 1 TABLET: 49; 51 TABLET, FILM COATED ORAL at 21:03

## 2025-03-25 RX ADMIN — Medication 2000 UNITS: at 09:50

## 2025-03-25 RX ADMIN — ATORVASTATIN CALCIUM 10 MG: 10 TABLET, FILM COATED ORAL at 09:50

## 2025-03-25 RX ADMIN — ASPIRIN 81 MG: 81 TABLET, COATED ORAL at 09:50

## 2025-03-25 RX ADMIN — IPRATROPIUM BROMIDE AND ALBUTEROL SULFATE 1 DOSE: 2.5; .5 SOLUTION RESPIRATORY (INHALATION) at 21:21

## 2025-03-25 ASSESSMENT — PULMONARY FUNCTION TESTS: PEFR_L/MIN: 150

## 2025-03-25 ASSESSMENT — PAIN - FUNCTIONAL ASSESSMENT: PAIN_FUNCTIONAL_ASSESSMENT: PREVENTS OR INTERFERES SOME ACTIVE ACTIVITIES AND ADLS

## 2025-03-25 ASSESSMENT — PAIN SCALES - GENERAL
PAINLEVEL_OUTOF10: 6
PAINLEVEL_OUTOF10: 7
PAINLEVEL_OUTOF10: 2

## 2025-03-25 ASSESSMENT — PAIN DESCRIPTION - ORIENTATION: ORIENTATION: RIGHT

## 2025-03-25 ASSESSMENT — PAIN DESCRIPTION - LOCATION: LOCATION: FACE

## 2025-03-25 ASSESSMENT — PAIN DESCRIPTION - DESCRIPTORS: DESCRIPTORS: ACHING;SORE;TENDER

## 2025-03-25 NOTE — NURSE NAVIGATOR
Patient's chart updated to reflect:      .    - HF care plan, HF education points and HF discharge instructions.  -Orders: 2 gram sodium diet, daily weights, I/O.  -PCP or cardiology follow up appointments to be scheduled within 7 days of hospital discharge.  -CHF education session will be provided to the patient prior to hospital discharge.     Tana Juarez RN, CHFN   Heart Failure Navigator

## 2025-03-25 NOTE — PLAN OF CARE
Problem: Skin/Tissue Integrity  Goal: Skin integrity remains intact  Description: 1.  Monitor for areas of redness and/or skin breakdown  2.  Assess vascular access sites hourly  3.  Every 4-6 hours minimum:  Change oxygen saturation probe site  4.  Every 4-6 hours:  If on nasal continuous positive airway pressure, respiratory therapy assess nares and determine need for appliance change or resting period  3/25/2025 0751 by Madie Waite RN  Outcome: Progressing     Problem: Safety - Adult  Goal: Free from fall injury  3/25/2025 0751 by Madie Waite RN  Outcome: Progressing     Problem: Chronic Conditions and Co-morbidities  Goal: Patient's chronic conditions and co-morbidity symptoms are monitored and maintained or improved  3/25/2025 0751 by Madie Waite RN  Outcome: Progressing     Problem: Discharge Planning  Goal: Discharge to home or other facility with appropriate resources  3/25/2025 0751 by Madie Waite RN  Outcome: Progressing     Problem: Pain  Goal: Verbalizes/displays adequate comfort level or baseline comfort level  3/25/2025 0751 by Madie Waite RN  Outcome: Progressing     Problem: ABCDS Injury Assessment  Goal: Absence of physical injury  3/25/2025 0751 by Madie Waite RN  Outcome: Progressing

## 2025-03-25 NOTE — CARE COORDINATION
3/25/25  Spoke with patient regarding transition of care.  Patient admitted for pneumonia. Patient is on IV Doxy, IV Rocephin and IV solu-medrol.  Patient also receiving IV Bumex to diurese.  Patient is on 5 liters of 02 with attempt to wean as patient states she only wears 02 at bed time.  Patient states she wears a Trilogy supplied by [employer] .  Patient is pending consults with pulmonology as well as Cardiology. GI is following with colonoscopy planned for Friday once respiratory status improves. Discharge goal is home when medically stable.  [facility] is following for post discharge support. Home Care order will need placed prior to discharge for home care as well as a 3 in 1 through [facility]. MISSY/HAILEE to follow.    Electronically signed by SINDHU Thomas on 3/25/2025 at 11:35 AM

## 2025-03-25 NOTE — DISCHARGE INSTRUCTIONS
whether you need another dose.       My Goal for Self-management of Heart Failure Includes 5 steps :    1. Notice a change in symptoms ( weight gain, short of breath, leg swelling, decreased activity level, bloating....)    2. Evaluate the change: (use the Heart Failure Zones )     3. Decide to take action: decide what your options are, such as: (call your doctor for an extra visit, take a prescribed medication, such as your water pill if your doctor has given you directions to do so, CALL YOUR DOCTOR)    4. Come up with a strategy:  (now you call the doctor for advice / appointment. This is where you take action!!!  Do not wait, catch the symptom early and treat it before it worsens.    5. Evaluate the response: The next day, check your Heart Failure Zones: are you in the GREEN ZONE (safe zone)?  Worsening symptoms of YELLOW ZONE? Or have you moved to the RED ZONE and need to call 911 or go to the Emergency Room for evaluation? Call your doctor's office to update them on your symptoms of heart failure.              Constipation: Care Instructions  Overview     Constipation means that you have a hard time passing stools (bowel movements). People pass stools from 3 times a day to once every 3 days. What is normal for you may be different. Constipation may occur with pain in the rectum and cramping. The pain may get worse when you try to pass stools. Sometimes there are small amounts of bright red blood on toilet paper or the surface of stools. This is because of enlarged veins near the rectum (hemorrhoids).  A few changes in your diet and lifestyle may help you avoid ongoing constipation. Your doctor may also prescribe medicine to help loosen your stool.  Some medicines can cause constipation. These include pain medicines and antidepressants. Tell your doctor about all the medicines you take. Your doctor may want to make a medicine change to ease your symptoms.  Follow-up care is a key part of your treatment and

## 2025-03-25 NOTE — PLAN OF CARE
Problem: Skin/Tissue Integrity  Goal: Skin integrity remains intact  Description: 1.  Monitor for areas of redness and/or skin breakdown  2.  Assess vascular access sites hourly  3.  Every 4-6 hours minimum:  Change oxygen saturation probe site  4.  Every 4-6 hours:  If on nasal continuous positive airway pressure, respiratory therapy assess nares and determine need for appliance change or resting period  3/25/2025 0315 by Lance Rasmussen RN  Outcome: Progressing     Problem: Safety - Adult  Goal: Free from fall injury  3/25/2025 0315 by Lance Rasmussen RN  Outcome: Progressing     Problem: Chronic Conditions and Co-morbidities  Goal: Patient's chronic conditions and co-morbidity symptoms are monitored and maintained or improved  3/25/2025 0315 by Lance Rasmussen RN  Outcome: Progressing     Problem: Discharge Planning  Goal: Discharge to home or other facility with appropriate resources  3/25/2025 0315 by Lance Rasmussen RN  Outcome: Progressing     Problem: Pain  Goal: Verbalizes/displays adequate comfort level or baseline comfort level  3/25/2025 0315 by Lance Rasmussen RN  Outcome: Progressing     Problem: ABCDS Injury Assessment  Goal: Absence of physical injury  3/25/2025 0315 by Lance Rasmussen RN  Outcome: Progressing

## 2025-03-25 NOTE — PATIENT CARE CONFERENCE
[facility] Quality Flow/Interdisciplinary Rounds Progress Note        Quality Flow Rounds held on March 25, 2025    Disciplines Attending:  Bedside Nurse, , , and Nursing Unit Leadership    Steph Gonzáles was admitted on 3/23/2025  5:22 PM    Anticipated Discharge Date:       Disposition:    Jadon Score:  Jadon Scale Score: 19    [facility] RISK OF UNPLANNED READMISSION 2.0             15.8 Total Score        Discussed patient goal for the day, patient clinical progression, and barriers to discharge.  The following Goal(s) of the Day/Commitment(s) have been identified:  Report labs/diagnostics      Romy Underwood RN  March 25, 2025

## 2025-03-26 ENCOUNTER — APPOINTMENT (OUTPATIENT)
Dept: GENERAL RADIOLOGY | Age: 70
DRG: 291 | End: 2025-03-26
Payer: MEDICARE

## 2025-03-26 ENCOUNTER — APPOINTMENT (OUTPATIENT)
Dept: MRI IMAGING | Age: 70
DRG: 291 | End: 2025-03-26
Payer: MEDICARE

## 2025-03-26 PROBLEM — R56.9 SEIZURE-LIKE ACTIVITY (HCC): Status: ACTIVE | Noted: 2025-03-26

## 2025-03-26 LAB
ANION GAP SERPL CALCULATED.3IONS-SCNC: 15 MMOL/L (ref 7–16)
BASOPHILS # BLD: 0 K/UL (ref 0–0.2)
BASOPHILS NFR BLD: 0 % (ref 0–2)
BUN SERPL-MCNC: 34 MG/DL (ref 6–23)
CALCIUM SERPL-MCNC: 8.2 MG/DL (ref 8.6–10.2)
CHLORIDE SERPL-SCNC: 100 MMOL/L (ref 98–107)
CO2 SERPL-SCNC: 22 MMOL/L (ref 22–29)
CREAT SERPL-MCNC: 1.7 MG/DL (ref 0.5–1)
EOSINOPHIL # BLD: 0 K/UL (ref 0.05–0.5)
EOSINOPHILS RELATIVE PERCENT: 0 % (ref 0–6)
ERYTHROCYTE [DISTWIDTH] IN BLOOD BY AUTOMATED COUNT: 12.5 % (ref 11.5–15)
GFR, ESTIMATED: 31 ML/MIN/1.73M2
GLUCOSE BLD-MCNC: 197 MG/DL (ref 74–99)
GLUCOSE BLD-MCNC: 197 MG/DL (ref 74–99)
GLUCOSE BLD-MCNC: 201 MG/DL (ref 74–99)
GLUCOSE BLD-MCNC: 280 MG/DL (ref 74–99)
GLUCOSE SERPL-MCNC: 153 MG/DL (ref 74–99)
HCT VFR BLD AUTO: 36.2 % (ref 34–48)
HGB BLD-MCNC: 11.1 G/DL (ref 11.5–15.5)
IMM GRANULOCYTES # BLD AUTO: 0.03 K/UL (ref 0–0.58)
IMM GRANULOCYTES NFR BLD: 0 % (ref 0–5)
LYMPHOCYTES NFR BLD: 0.52 K/UL (ref 1.5–4)
LYMPHOCYTES RELATIVE PERCENT: 6 % (ref 20–42)
MCH RBC QN AUTO: 31.3 PG (ref 26–35)
MCHC RBC AUTO-ENTMCNC: 30.7 G/DL (ref 32–34.5)
MCV RBC AUTO: 102 FL (ref 80–99.9)
MICROORGANISM SPEC CULT: ABNORMAL
MICROORGANISM SPEC CULT: ABNORMAL
MONOCYTES NFR BLD: 0.39 K/UL (ref 0.1–0.95)
MONOCYTES NFR BLD: 5 % (ref 2–12)
NEUTROPHILS NFR BLD: 89 % (ref 43–80)
NEUTS SEG NFR BLD: 7.78 K/UL (ref 1.8–7.3)
PLATELET # BLD AUTO: 297 K/UL (ref 130–450)
PMV BLD AUTO: 9.9 FL (ref 7–12)
POTASSIUM SERPL-SCNC: 5.1 MMOL/L (ref 3.5–5)
RBC # BLD AUTO: 3.55 M/UL (ref 3.5–5.5)
RBC # BLD: ABNORMAL 10*6/UL
S PNEUM AG SPEC QL: NEGATIVE
SERVICE CMNT-IMP: ABNORMAL
SODIUM SERPL-SCNC: 137 MMOL/L (ref 132–146)
SPECIMEN DESCRIPTION: ABNORMAL
SPECIMEN SOURCE: NORMAL
WBC OTHER # BLD: 8.7 K/UL (ref 4.5–11.5)

## 2025-03-26 PROCEDURE — A9577 INJ MULTIHANCE: HCPCS | Performed by: RADIOLOGY

## 2025-03-26 PROCEDURE — 6370000000 HC RX 637 (ALT 250 FOR IP): Performed by: INTERNAL MEDICINE

## 2025-03-26 PROCEDURE — 6360000004 HC RX CONTRAST MEDICATION: Performed by: RADIOLOGY

## 2025-03-26 PROCEDURE — 71046 X-RAY EXAM CHEST 2 VIEWS: CPT

## 2025-03-26 PROCEDURE — 99232 SBSQ HOSP IP/OBS MODERATE 35: CPT | Performed by: NURSE PRACTITIONER

## 2025-03-26 PROCEDURE — 99231 SBSQ HOSP IP/OBS SF/LOW 25: CPT

## 2025-03-26 PROCEDURE — 6370000000 HC RX 637 (ALT 250 FOR IP)

## 2025-03-26 PROCEDURE — 1200000000 HC SEMI PRIVATE

## 2025-03-26 PROCEDURE — 80048 BASIC METABOLIC PNL TOTAL CA: CPT

## 2025-03-26 PROCEDURE — 2580000003 HC RX 258: Performed by: INTERNAL MEDICINE

## 2025-03-26 PROCEDURE — 6360000002 HC RX W HCPCS: Performed by: HOSPITALIST

## 2025-03-26 PROCEDURE — 2700000000 HC OXYGEN THERAPY PER DAY

## 2025-03-26 PROCEDURE — 6370000000 HC RX 637 (ALT 250 FOR IP): Performed by: STUDENT IN AN ORGANIZED HEALTH CARE EDUCATION/TRAINING PROGRAM

## 2025-03-26 PROCEDURE — 85025 COMPLETE CBC W/AUTO DIFF WBC: CPT

## 2025-03-26 PROCEDURE — 99232 SBSQ HOSP IP/OBS MODERATE 35: CPT | Performed by: STUDENT IN AN ORGANIZED HEALTH CARE EDUCATION/TRAINING PROGRAM

## 2025-03-26 PROCEDURE — 6370000000 HC RX 637 (ALT 250 FOR IP): Performed by: HOSPITALIST

## 2025-03-26 PROCEDURE — 82962 GLUCOSE BLOOD TEST: CPT

## 2025-03-26 PROCEDURE — 94640 AIRWAY INHALATION TREATMENT: CPT

## 2025-03-26 PROCEDURE — 2500000003 HC RX 250 WO HCPCS: Performed by: INTERNAL MEDICINE

## 2025-03-26 PROCEDURE — 36415 COLL VENOUS BLD VENIPUNCTURE: CPT

## 2025-03-26 PROCEDURE — 36600 WITHDRAWAL OF ARTERIAL BLOOD: CPT

## 2025-03-26 PROCEDURE — 2500000003 HC RX 250 WO HCPCS: Performed by: HOSPITALIST

## 2025-03-26 PROCEDURE — 2500000003 HC RX 250 WO HCPCS

## 2025-03-26 PROCEDURE — 2500000003 HC RX 250 WO HCPCS: Performed by: STUDENT IN AN ORGANIZED HEALTH CARE EDUCATION/TRAINING PROGRAM

## 2025-03-26 PROCEDURE — 70553 MRI BRAIN STEM W/O & W/DYE: CPT

## 2025-03-26 PROCEDURE — 94660 CPAP INITIATION&MGMT: CPT

## 2025-03-26 PROCEDURE — 6360000002 HC RX W HCPCS: Performed by: STUDENT IN AN ORGANIZED HEALTH CARE EDUCATION/TRAINING PROGRAM

## 2025-03-26 PROCEDURE — 76937 US GUIDE VASCULAR ACCESS: CPT

## 2025-03-26 RX ORDER — SODIUM CHLORIDE 9 MG/ML
INJECTION, SOLUTION INTRAVENOUS PRN
Status: DISCONTINUED | OUTPATIENT
Start: 2025-03-26 | End: 2025-03-30 | Stop reason: HOSPADM

## 2025-03-26 RX ORDER — SODIUM CHLORIDE 0.9 % (FLUSH) 0.9 %
5-40 SYRINGE (ML) INJECTION EVERY 12 HOURS SCHEDULED
Status: DISCONTINUED | OUTPATIENT
Start: 2025-03-26 | End: 2025-03-30 | Stop reason: HOSPADM

## 2025-03-26 RX ORDER — LORAZEPAM 2 MG/ML
0.25 INJECTION INTRAMUSCULAR ONCE
Status: COMPLETED | OUTPATIENT
Start: 2025-03-26 | End: 2025-03-26

## 2025-03-26 RX ORDER — LIDOCAINE HYDROCHLORIDE 10 MG/ML
50 INJECTION, SOLUTION EPIDURAL; INFILTRATION; INTRACAUDAL; PERINEURAL ONCE
Status: DISCONTINUED | OUTPATIENT
Start: 2025-03-26 | End: 2025-03-30 | Stop reason: HOSPADM

## 2025-03-26 RX ORDER — SODIUM CHLORIDE 0.9 % (FLUSH) 0.9 %
5-40 SYRINGE (ML) INJECTION PRN
Status: DISCONTINUED | OUTPATIENT
Start: 2025-03-26 | End: 2025-03-30 | Stop reason: HOSPADM

## 2025-03-26 RX ADMIN — SACUBITRIL AND VALSARTAN 1 TABLET: 49; 51 TABLET, FILM COATED ORAL at 09:02

## 2025-03-26 RX ADMIN — Medication 1 TABLET: at 09:02

## 2025-03-26 RX ADMIN — INSULIN LISPRO 1 UNITS: 100 INJECTION, SOLUTION INTRAVENOUS; SUBCUTANEOUS at 13:00

## 2025-03-26 RX ADMIN — ASPIRIN 81 MG: 81 TABLET, COATED ORAL at 09:01

## 2025-03-26 RX ADMIN — HYDRALAZINE HYDROCHLORIDE 100 MG: 50 TABLET ORAL at 21:46

## 2025-03-26 RX ADMIN — CETIRIZINE HYDROCHLORIDE 10 MG: 10 TABLET, FILM COATED ORAL at 09:02

## 2025-03-26 RX ADMIN — OXYBUTYNIN CHLORIDE 15 MG: 5 TABLET, EXTENDED RELEASE ORAL at 22:28

## 2025-03-26 RX ADMIN — TIZANIDINE 4 MG: 4 TABLET ORAL at 21:47

## 2025-03-26 RX ADMIN — LORAZEPAM 0.26 MG: 2 INJECTION INTRAMUSCULAR; INTRAVENOUS at 17:34

## 2025-03-26 RX ADMIN — INSULIN LISPRO 2 UNITS: 100 INJECTION, SOLUTION INTRAVENOUS; SUBCUTANEOUS at 22:01

## 2025-03-26 RX ADMIN — INSULIN GLARGINE 10 UNITS: 100 INJECTION, SOLUTION SUBCUTANEOUS at 09:00

## 2025-03-26 RX ADMIN — TRAMADOL HYDROCHLORIDE 50 MG: 50 TABLET, COATED ORAL at 11:53

## 2025-03-26 RX ADMIN — IPRATROPIUM BROMIDE AND ALBUTEROL SULFATE 1 DOSE: 2.5; .5 SOLUTION RESPIRATORY (INHALATION) at 09:57

## 2025-03-26 RX ADMIN — WATER 1000 MG: 1 INJECTION INTRAMUSCULAR; INTRAVENOUS; SUBCUTANEOUS at 09:00

## 2025-03-26 RX ADMIN — PANTOPRAZOLE SODIUM 40 MG: 40 TABLET, DELAYED RELEASE ORAL at 05:51

## 2025-03-26 RX ADMIN — DOXYCYCLINE 100 MG: 100 INJECTION, POWDER, LYOPHILIZED, FOR SOLUTION INTRAVENOUS at 22:02

## 2025-03-26 RX ADMIN — IPRATROPIUM BROMIDE AND ALBUTEROL SULFATE 1 DOSE: 2.5; .5 SOLUTION RESPIRATORY (INHALATION) at 15:54

## 2025-03-26 RX ADMIN — SODIUM CHLORIDE, PRESERVATIVE FREE 10 ML: 5 INJECTION INTRAVENOUS at 21:47

## 2025-03-26 RX ADMIN — PREDNISONE 40 MG: 20 TABLET ORAL at 09:02

## 2025-03-26 RX ADMIN — ATORVASTATIN CALCIUM 10 MG: 10 TABLET, FILM COATED ORAL at 09:03

## 2025-03-26 RX ADMIN — MINOXIDIL 2.5 MG: 2.5 TABLET ORAL at 09:03

## 2025-03-26 RX ADMIN — GADOBENATE DIMEGLUMINE 20 ML: 529 INJECTION, SOLUTION INTRAVENOUS at 16:20

## 2025-03-26 RX ADMIN — BUMETANIDE 1 MG: 1 TABLET ORAL at 09:02

## 2025-03-26 RX ADMIN — TRAZODONE HYDROCHLORIDE 150 MG: 50 TABLET ORAL at 21:47

## 2025-03-26 RX ADMIN — DICYCLOMINE HYDROCHLORIDE 10 MG: 10 CAPSULE ORAL at 09:02

## 2025-03-26 RX ADMIN — INSULIN LISPRO 1 UNITS: 100 INJECTION, SOLUTION INTRAVENOUS; SUBCUTANEOUS at 17:09

## 2025-03-26 RX ADMIN — DULOXETINE HYDROCHLORIDE 60 MG: 60 CAPSULE, DELAYED RELEASE ORAL at 09:02

## 2025-03-26 RX ADMIN — TRAMADOL HYDROCHLORIDE 50 MG: 50 TABLET, COATED ORAL at 22:01

## 2025-03-26 RX ADMIN — SODIUM CHLORIDE, PRESERVATIVE FREE 10 ML: 5 INJECTION INTRAVENOUS at 22:09

## 2025-03-26 RX ADMIN — DOXYCYCLINE 100 MG: 100 INJECTION, POWDER, LYOPHILIZED, FOR SOLUTION INTRAVENOUS at 11:55

## 2025-03-26 RX ADMIN — HYDRALAZINE HYDROCHLORIDE 100 MG: 50 TABLET ORAL at 09:02

## 2025-03-26 RX ADMIN — TRAMADOL HYDROCHLORIDE 50 MG: 50 TABLET, COATED ORAL at 01:49

## 2025-03-26 RX ADMIN — ARFORMOTEROL TARTRATE: 15 SOLUTION RESPIRATORY (INHALATION) at 09:57

## 2025-03-26 RX ADMIN — INSULIN LISPRO 1 UNITS: 100 INJECTION, SOLUTION INTRAVENOUS; SUBCUTANEOUS at 09:01

## 2025-03-26 RX ADMIN — MICONAZOLE NITRATE: 20 POWDER TOPICAL at 08:59

## 2025-03-26 RX ADMIN — SODIUM CHLORIDE, PRESERVATIVE FREE 10 ML: 5 INJECTION INTRAVENOUS at 09:04

## 2025-03-26 RX ADMIN — SACUBITRIL AND VALSARTAN 1 TABLET: 49; 51 TABLET, FILM COATED ORAL at 22:28

## 2025-03-26 RX ADMIN — Medication 2000 UNITS: at 09:03

## 2025-03-26 RX ADMIN — MINOXIDIL 2.5 MG: 2.5 TABLET ORAL at 22:29

## 2025-03-26 RX ADMIN — DICYCLOMINE HYDROCHLORIDE 10 MG: 10 CAPSULE ORAL at 21:47

## 2025-03-26 RX ADMIN — ISOSORBIDE MONONITRATE 60 MG: 30 TABLET, EXTENDED RELEASE ORAL at 09:02

## 2025-03-26 ASSESSMENT — PAIN DESCRIPTION - ORIENTATION: ORIENTATION: RIGHT

## 2025-03-26 ASSESSMENT — PAIN DESCRIPTION - LOCATION
LOCATION: BACK
LOCATION: BACK;LEG
LOCATION: LEG

## 2025-03-26 ASSESSMENT — PAIN SCALES - GENERAL
PAINLEVEL_OUTOF10: 7
PAINLEVEL_OUTOF10: 5
PAINLEVEL_OUTOF10: 7
PAINLEVEL_OUTOF10: 6
PAINLEVEL_OUTOF10: 10
PAINLEVEL_OUTOF10: 4

## 2025-03-26 ASSESSMENT — PAIN DESCRIPTION - DESCRIPTORS
DESCRIPTORS: ACHING;DISCOMFORT;SORE
DESCRIPTORS: ACHING;SPASM;THROBBING
DESCRIPTORS: ACHING;DISCOMFORT;SORE

## 2025-03-26 ASSESSMENT — PAIN DESCRIPTION - PAIN TYPE: TYPE: CHRONIC PAIN

## 2025-03-26 ASSESSMENT — PULMONARY FUNCTION TESTS: PEFR_L/MIN: 150

## 2025-03-26 NOTE — PLAN OF CARE
Problem: Skin/Tissue Integrity  Goal: Skin integrity remains intact  Description: 1.  Monitor for areas of redness and/or skin breakdown  2.  Assess vascular access sites hourly  3.  Every 4-6 hours minimum:  Change oxygen saturation probe site  4.  Every 4-6 hours:  If on nasal continuous positive airway pressure, respiratory therapy assess nares and determine need for appliance change or resting period  3/26/2025 1117 by Madie Waite RN  Outcome: Progressing     Problem: Safety - Adult  Goal: Free from fall injury  3/26/2025 1117 by Madie Waite RN  Outcome: Progressing     Problem: Chronic Conditions and Co-morbidities  Goal: Patient's chronic conditions and co-morbidity symptoms are monitored and maintained or improved  3/26/2025 1117 by Madie Waite RN  Outcome: Progressing     Problem: Discharge Planning  Goal: Discharge to home or other facility with appropriate resources  3/26/2025 1117 by Madie Waite RN  Outcome: Progressing     Problem: Pain  Goal: Verbalizes/displays adequate comfort level or baseline comfort level  3/26/2025 1117 by Madie Waite RN  Outcome: Progressing     Problem: ABCDS Injury Assessment  Goal: Absence of physical injury  3/26/2025 1117 by Madie Waite RN  Outcome: Progressing

## 2025-03-26 NOTE — PATIENT CARE CONFERENCE
[facility] Quality Flow/Interdisciplinary Rounds Progress Note        Quality Flow Rounds held on March 26, 2025    Disciplines Attending:  Bedside Nurse, , , and Nursing Unit Leadership    Steph Gonzáles was admitted on 3/23/2025  5:22 PM    Anticipated Discharge Date:       Disposition:    Jadon Score:  Jadon Scale Score: 19    [facility] RISK OF UNPLANNED READMISSION 2.0             15.9 Total Score        Discussed patient goal for the day, patient clinical progression, and barriers to discharge.  The following Goal(s) of the Day/Commitment(s) have been identified:  Labs - Report Results agree to have labs draw and accurate I/O      Janell Wilder RN  March 26, 2025

## 2025-03-26 NOTE — PLAN OF CARE
Problem: Skin/Tissue Integrity  Goal: Skin integrity remains intact  Description: 1.  Monitor for areas of redness and/or skin breakdown  2.  Assess vascular access sites hourly  3.  Every 4-6 hours minimum:  Change oxygen saturation probe site  4.  Every 4-6 hours:  If on nasal continuous positive airway pressure, respiratory therapy assess nares and determine need for appliance change or resting period  Outcome: Progressing  Flowsheets (Taken 3/25/2025 2045)  Skin Integrity Remains Intact: Monitor for areas of redness and/or skin breakdown     Problem: Safety - Adult  Goal: Free from fall injury  Outcome: Progressing     Problem: Chronic Conditions and Co-morbidities  Goal: Patient's chronic conditions and co-morbidity symptoms are monitored and maintained or improved  Outcome: Progressing  Flowsheets (Taken 3/25/2025 2045)  Care Plan - Patient's Chronic Conditions and Co-Morbidity Symptoms are Monitored and Maintained or Improved: Monitor and assess patient's chronic conditions and comorbid symptoms for stability, deterioration, or improvement     Problem: Discharge Planning  Goal: Discharge to home or other facility with appropriate resources  Outcome: Progressing  Flowsheets (Taken 3/25/2025 2045)  Discharge to home or other facility with appropriate resources: Identify barriers to discharge with patient and caregiver     Problem: Pain  Goal: Verbalizes/displays adequate comfort level or baseline comfort level  Outcome: Progressing     Problem: ABCDS Injury Assessment  Goal: Absence of physical injury  Outcome: Progressing

## 2025-03-26 NOTE — CARE COORDINATION
3/26/25  Transition of Care update. Patient admitted for pneumonia with respiratory failure.  Patient is being followed by Pulm, Cardio, GI and has a new consult to Neurology.  Patient continues on IV Rocephin, IV Doxy and IV Solu-Medrol.  NIV is supplied by [employer] with setting obtained for pulmonology. Patient states she only wears the NIV at night. Patient is on 5 liters of 02 cont. With attempts to wean. If unable to wean a walking pulse ox and 02 order will likely need obtained for additional  home 02. Patient is planned for a colonoscopy on Friday.  PT/OT evals reveal AM-PAC of 16/24 for PT as well as OT.  [facility] is following for post discharge support.  Home Care orders will need placed prior to discharge.  Patient is also requesting a 3 in 1 for discharge through [facility]. MISSY/HAILEE to follow.    Electronically signed by SINDHU Thomas on 3/26/2025 at 1:28 PM

## 2025-03-27 ENCOUNTER — APPOINTMENT (OUTPATIENT)
Dept: NEUROLOGY | Age: 70
DRG: 291 | End: 2025-03-27
Payer: MEDICARE

## 2025-03-27 LAB
GLUCOSE BLD-MCNC: 144 MG/DL (ref 74–99)
GLUCOSE BLD-MCNC: 282 MG/DL (ref 74–99)
GLUCOSE BLD-MCNC: 314 MG/DL (ref 74–99)
GLUCOSE BLD-MCNC: 96 MG/DL (ref 74–99)
MICROORGANISM SPEC CULT: NORMAL
SPECIMEN DESCRIPTION: NORMAL

## 2025-03-27 PROCEDURE — 6370000000 HC RX 637 (ALT 250 FOR IP)

## 2025-03-27 PROCEDURE — 99232 SBSQ HOSP IP/OBS MODERATE 35: CPT | Performed by: NURSE PRACTITIONER

## 2025-03-27 PROCEDURE — 2700000000 HC OXYGEN THERAPY PER DAY

## 2025-03-27 PROCEDURE — 6370000000 HC RX 637 (ALT 250 FOR IP): Performed by: STUDENT IN AN ORGANIZED HEALTH CARE EDUCATION/TRAINING PROGRAM

## 2025-03-27 PROCEDURE — 95816 EEG AWAKE AND DROWSY: CPT | Performed by: PSYCHIATRY & NEUROLOGY

## 2025-03-27 PROCEDURE — 6370000000 HC RX 637 (ALT 250 FOR IP): Performed by: HOSPITALIST

## 2025-03-27 PROCEDURE — 2500000003 HC RX 250 WO HCPCS: Performed by: STUDENT IN AN ORGANIZED HEALTH CARE EDUCATION/TRAINING PROGRAM

## 2025-03-27 PROCEDURE — 82962 GLUCOSE BLOOD TEST: CPT

## 2025-03-27 PROCEDURE — 2500000003 HC RX 250 WO HCPCS: Performed by: HOSPITALIST

## 2025-03-27 PROCEDURE — 6370000000 HC RX 637 (ALT 250 FOR IP): Performed by: INTERNAL MEDICINE

## 2025-03-27 PROCEDURE — 99232 SBSQ HOSP IP/OBS MODERATE 35: CPT | Performed by: STUDENT IN AN ORGANIZED HEALTH CARE EDUCATION/TRAINING PROGRAM

## 2025-03-27 PROCEDURE — 94640 AIRWAY INHALATION TREATMENT: CPT

## 2025-03-27 PROCEDURE — 6360000002 HC RX W HCPCS: Performed by: HOSPITALIST

## 2025-03-27 PROCEDURE — 95819 EEG AWAKE AND ASLEEP: CPT

## 2025-03-27 PROCEDURE — 99232 SBSQ HOSP IP/OBS MODERATE 35: CPT

## 2025-03-27 PROCEDURE — 1200000000 HC SEMI PRIVATE

## 2025-03-27 PROCEDURE — 4A10X4Z MONITORING OF CENTRAL NERVOUS ELECTRICAL ACTIVITY, EXTERNAL APPROACH: ICD-10-PCS | Performed by: PSYCHIATRY & NEUROLOGY

## 2025-03-27 RX ORDER — DOXYCYCLINE 100 MG/1
100 CAPSULE ORAL EVERY 12 HOURS SCHEDULED
Status: COMPLETED | OUTPATIENT
Start: 2025-03-27 | End: 2025-03-29

## 2025-03-27 RX ADMIN — HYDRALAZINE HYDROCHLORIDE 100 MG: 50 TABLET ORAL at 08:48

## 2025-03-27 RX ADMIN — DICYCLOMINE HYDROCHLORIDE 10 MG: 10 CAPSULE ORAL at 08:48

## 2025-03-27 RX ADMIN — INSULIN LISPRO 2 UNITS: 100 INJECTION, SOLUTION INTRAVENOUS; SUBCUTANEOUS at 20:09

## 2025-03-27 RX ADMIN — OXYBUTYNIN CHLORIDE 15 MG: 5 TABLET, EXTENDED RELEASE ORAL at 20:08

## 2025-03-27 RX ADMIN — CETIRIZINE HYDROCHLORIDE 10 MG: 10 TABLET, FILM COATED ORAL at 08:49

## 2025-03-27 RX ADMIN — DOCUSATE SODIUM 100 MG: 100 CAPSULE, LIQUID FILLED ORAL at 20:08

## 2025-03-27 RX ADMIN — SACUBITRIL AND VALSARTAN 1 TABLET: 49; 51 TABLET, FILM COATED ORAL at 20:08

## 2025-03-27 RX ADMIN — BUMETANIDE 1 MG: 1 TABLET ORAL at 08:49

## 2025-03-27 RX ADMIN — INSULIN LISPRO 3 UNITS: 100 INJECTION, SOLUTION INTRAVENOUS; SUBCUTANEOUS at 17:25

## 2025-03-27 RX ADMIN — PREDNISONE 40 MG: 20 TABLET ORAL at 08:49

## 2025-03-27 RX ADMIN — INSULIN GLARGINE 10 UNITS: 100 INJECTION, SOLUTION SUBCUTANEOUS at 08:51

## 2025-03-27 RX ADMIN — DULOXETINE HYDROCHLORIDE 60 MG: 60 CAPSULE, DELAYED RELEASE ORAL at 08:49

## 2025-03-27 RX ADMIN — Medication 2000 UNITS: at 08:49

## 2025-03-27 RX ADMIN — SACUBITRIL AND VALSARTAN 1 TABLET: 49; 51 TABLET, FILM COATED ORAL at 09:09

## 2025-03-27 RX ADMIN — ARFORMOTEROL TARTRATE: 15 SOLUTION RESPIRATORY (INHALATION) at 09:02

## 2025-03-27 RX ADMIN — TIZANIDINE 4 MG: 4 TABLET ORAL at 20:08

## 2025-03-27 RX ADMIN — DICYCLOMINE HYDROCHLORIDE 10 MG: 10 CAPSULE ORAL at 20:09

## 2025-03-27 RX ADMIN — HYDRALAZINE HYDROCHLORIDE 100 MG: 50 TABLET ORAL at 20:08

## 2025-03-27 RX ADMIN — Medication 1 TABLET: at 08:49

## 2025-03-27 RX ADMIN — ISOSORBIDE MONONITRATE 60 MG: 30 TABLET, EXTENDED RELEASE ORAL at 08:48

## 2025-03-27 RX ADMIN — DOXYCYCLINE HYCLATE 100 MG: 100 CAPSULE ORAL at 10:30

## 2025-03-27 RX ADMIN — WATER 1000 MG: 1 INJECTION INTRAMUSCULAR; INTRAVENOUS; SUBCUTANEOUS at 08:56

## 2025-03-27 RX ADMIN — TRAMADOL HYDROCHLORIDE 50 MG: 50 TABLET, COATED ORAL at 04:24

## 2025-03-27 RX ADMIN — IPRATROPIUM BROMIDE AND ALBUTEROL SULFATE 1 DOSE: 2.5; .5 SOLUTION RESPIRATORY (INHALATION) at 16:26

## 2025-03-27 RX ADMIN — ASPIRIN 81 MG: 81 TABLET, COATED ORAL at 08:48

## 2025-03-27 RX ADMIN — MICONAZOLE NITRATE: 20 POWDER TOPICAL at 20:10

## 2025-03-27 RX ADMIN — MINOXIDIL 2.5 MG: 2.5 TABLET ORAL at 09:09

## 2025-03-27 RX ADMIN — SODIUM CHLORIDE, PRESERVATIVE FREE 10 ML: 5 INJECTION INTRAVENOUS at 08:53

## 2025-03-27 RX ADMIN — PANTOPRAZOLE SODIUM 40 MG: 40 TABLET, DELAYED RELEASE ORAL at 06:21

## 2025-03-27 RX ADMIN — DOXYCYCLINE HYCLATE 100 MG: 100 CAPSULE ORAL at 20:08

## 2025-03-27 RX ADMIN — SODIUM CHLORIDE, PRESERVATIVE FREE 10 ML: 5 INJECTION INTRAVENOUS at 09:09

## 2025-03-27 RX ADMIN — TRAZODONE HYDROCHLORIDE 150 MG: 50 TABLET ORAL at 20:08

## 2025-03-27 RX ADMIN — ATORVASTATIN CALCIUM 10 MG: 10 TABLET, FILM COATED ORAL at 08:49

## 2025-03-27 RX ADMIN — TRAMADOL HYDROCHLORIDE 50 MG: 50 TABLET, COATED ORAL at 10:34

## 2025-03-27 RX ADMIN — IPRATROPIUM BROMIDE AND ALBUTEROL SULFATE 1 DOSE: 2.5; .5 SOLUTION RESPIRATORY (INHALATION) at 11:45

## 2025-03-27 RX ADMIN — MINOXIDIL 2.5 MG: 2.5 TABLET ORAL at 20:09

## 2025-03-27 RX ADMIN — IPRATROPIUM BROMIDE AND ALBUTEROL SULFATE 1 DOSE: 2.5; .5 SOLUTION RESPIRATORY (INHALATION) at 09:02

## 2025-03-27 RX ADMIN — IPRATROPIUM BROMIDE AND ALBUTEROL SULFATE 1 DOSE: 2.5; .5 SOLUTION RESPIRATORY (INHALATION) at 20:06

## 2025-03-27 RX ADMIN — ARFORMOTEROL TARTRATE: 15 SOLUTION RESPIRATORY (INHALATION) at 20:06

## 2025-03-27 RX ADMIN — TRAMADOL HYDROCHLORIDE 50 MG: 50 TABLET, COATED ORAL at 18:46

## 2025-03-27 ASSESSMENT — PAIN DESCRIPTION - DESCRIPTORS
DESCRIPTORS: ACHING;DISCOMFORT;SORE

## 2025-03-27 ASSESSMENT — PAIN DESCRIPTION - PAIN TYPE: TYPE: CHRONIC PAIN

## 2025-03-27 ASSESSMENT — PAIN DESCRIPTION - LOCATION
LOCATION: ABDOMEN;LEG
LOCATION: ABDOMEN;LEG;BACK
LOCATION: BACK

## 2025-03-27 ASSESSMENT — PAIN - FUNCTIONAL ASSESSMENT: PAIN_FUNCTIONAL_ASSESSMENT: ACTIVITIES ARE NOT PREVENTED

## 2025-03-27 ASSESSMENT — PAIN SCALES - GENERAL
PAINLEVEL_OUTOF10: 10
PAINLEVEL_OUTOF10: 4
PAINLEVEL_OUTOF10: 7
PAINLEVEL_OUTOF10: 9

## 2025-03-27 ASSESSMENT — PAIN DESCRIPTION - ORIENTATION
ORIENTATION: RIGHT;LEFT
ORIENTATION: RIGHT;LEFT

## 2025-03-27 ASSESSMENT — PULMONARY FUNCTION TESTS: PEFR_L/MIN: 130

## 2025-03-27 NOTE — CARE COORDINATION
Plan is home with sister and [facility] when medically ready. Home health care orders are in. Per GI note today, Possible C-Scope Monday or Tuesday. Respiratory issues need to continue to improve. Per pulmonary note today, Plan for colonoscopy  patient ok to proceed from a pulmonary active.  Use noninvasive ventilation postop until fully awake. Neurology following for seizure-like episode. MRI and EEG completed. Per internal med note today, Oxygen requirement down to 3 L via nasal cannula with O2 sat 98%, wean as able. She has home oxygen through [employer] (3.5L) while sleeping.  Continue Rocephin IV and doxycycline changed to oral for CAP coverage. S/p Solu-Medrol on oral prednisone. Continue breathing treatments. PT/OT [facility] score 16/24 for both on 3/25. Patient is also requesting a 3 in 1 for discharge through [facility]. DME order for BSC placed. Will call referral to [facility] DME tomorrow due to time of day.    Macrina Conrad RN   [phone]

## 2025-03-27 NOTE — PLAN OF CARE
Problem: Skin/Tissue Integrity  Goal: Skin integrity remains intact  Description: 1.  Monitor for areas of redness and/or skin breakdown  2.  Assess vascular access sites hourly  3.  Every 4-6 hours minimum:  Change oxygen saturation probe site  4.  Every 4-6 hours:  If on nasal continuous positive airway pressure, respiratory therapy assess nares and determine need for appliance change or resting period  3/26/2025 2329 by Rebeca Aguilera RN  Outcome: Progressing  3/26/2025 1117 by Madie Waite RN  Outcome: Progressing     Problem: Safety - Adult  Goal: Free from fall injury  3/26/2025 2329 by Rebeca Aguilera RN  Outcome: Progressing  3/26/2025 1117 by Madie Waite RN  Outcome: Progressing     Problem: Chronic Conditions and Co-morbidities  Goal: Patient's chronic conditions and co-morbidity symptoms are monitored and maintained or improved  3/26/2025 2329 by Rebeca Aguilera RN  Outcome: Progressing  3/26/2025 1117 by Madie Waite RN  Outcome: Progressing     Problem: Discharge Planning  Goal: Discharge to home or other facility with appropriate resources  3/26/2025 1117 by Madie Waite RN  Outcome: Progressing     Problem: Pain  Goal: Verbalizes/displays adequate comfort level or baseline comfort level  3/26/2025 1117 by Madie Waite RN  Outcome: Progressing     Problem: ABCDS Injury Assessment  Goal: Absence of physical injury  Recent Flowsheet Documentation  Taken 3/26/2025 2328 by Rebeca Aguilera RN  Absence of Physical Injury: Implement safety measures based on patient assessment  3/26/2025 1117 by Madie Waite RN  Outcome: Progressing

## 2025-03-27 NOTE — PLAN OF CARE
Problem: Skin/Tissue Integrity  Goal: Skin integrity remains intact  Description: 1.  Monitor for areas of redness and/or skin breakdown  2.  Assess vascular access sites hourly  3.  Every 4-6 hours minimum:  Change oxygen saturation probe site  4.  Every 4-6 hours:  If on nasal continuous positive airway pressure, respiratory therapy assess nares and determine need for appliance change or resting period  3/27/2025 1018 by Erum Keating, RN  Outcome: Progressing  3/26/2025 2329 by Rebeca Aguilera, RITU  Outcome: Progressing     Problem: Safety - Adult  Goal: Free from fall injury  3/27/2025 1018 by Erum Keating, RN  Outcome: Progressing  3/26/2025 2329 by Rebeca Aguilera, RN  Outcome: Progressing     Problem: Chronic Conditions and Co-morbidities  Goal: Patient's chronic conditions and co-morbidity symptoms are monitored and maintained or improved  3/26/2025 2329 by Rebeca Aguilera, RN  Outcome: Progressing     Problem: Discharge Planning  Goal: Discharge to home or other facility with appropriate resources  Outcome: Progressing

## 2025-03-28 ENCOUNTER — APPOINTMENT (OUTPATIENT)
Dept: GENERAL RADIOLOGY | Age: 70
DRG: 291 | End: 2025-03-28
Payer: MEDICARE

## 2025-03-28 LAB
ANION GAP SERPL CALCULATED.3IONS-SCNC: 14 MMOL/L (ref 7–16)
BASOPHILS # BLD: 0.01 K/UL (ref 0–0.2)
BASOPHILS NFR BLD: 0 % (ref 0–2)
BUN SERPL-MCNC: 32 MG/DL (ref 6–23)
CALCIUM SERPL-MCNC: 8.6 MG/DL (ref 8.6–10.2)
CHLORIDE SERPL-SCNC: 99 MMOL/L (ref 98–107)
CO2 SERPL-SCNC: 24 MMOL/L (ref 22–29)
CREAT SERPL-MCNC: 1.6 MG/DL (ref 0.5–1)
EOSINOPHIL # BLD: 0.01 K/UL (ref 0.05–0.5)
EOSINOPHILS RELATIVE PERCENT: 0 % (ref 0–6)
ERYTHROCYTE [DISTWIDTH] IN BLOOD BY AUTOMATED COUNT: 11.9 % (ref 11.5–15)
GFR, ESTIMATED: 34 ML/MIN/1.73M2
GLUCOSE BLD-MCNC: 163 MG/DL (ref 74–99)
GLUCOSE BLD-MCNC: 196 MG/DL (ref 74–99)
GLUCOSE BLD-MCNC: 266 MG/DL (ref 74–99)
GLUCOSE BLD-MCNC: 293 MG/DL (ref 74–99)
GLUCOSE SERPL-MCNC: 186 MG/DL (ref 74–99)
HCT VFR BLD AUTO: 33.2 % (ref 34–48)
HGB BLD-MCNC: 10.5 G/DL (ref 11.5–15.5)
IMM GRANULOCYTES # BLD AUTO: 0.05 K/UL (ref 0–0.58)
IMM GRANULOCYTES NFR BLD: 1 % (ref 0–5)
LYMPHOCYTES NFR BLD: 0.31 K/UL (ref 1.5–4)
LYMPHOCYTES RELATIVE PERCENT: 3 % (ref 20–42)
MCH RBC QN AUTO: 31.7 PG (ref 26–35)
MCHC RBC AUTO-ENTMCNC: 31.6 G/DL (ref 32–34.5)
MCV RBC AUTO: 100.3 FL (ref 80–99.9)
MICROORGANISM SPEC CULT: NORMAL
MICROORGANISM SPEC CULT: NORMAL
MONOCYTES NFR BLD: 0.41 K/UL (ref 0.1–0.95)
MONOCYTES NFR BLD: 4 % (ref 2–12)
NEUTROPHILS NFR BLD: 92 % (ref 43–80)
NEUTS SEG NFR BLD: 9.02 K/UL (ref 1.8–7.3)
PLATELET # BLD AUTO: 274 K/UL (ref 130–450)
PMV BLD AUTO: 9.9 FL (ref 7–12)
POTASSIUM SERPL-SCNC: 5.1 MMOL/L (ref 3.5–5)
RBC # BLD AUTO: 3.31 M/UL (ref 3.5–5.5)
RBC # BLD: ABNORMAL 10*6/UL
RBC # BLD: ABNORMAL 10*6/UL
SERVICE CMNT-IMP: NORMAL
SERVICE CMNT-IMP: NORMAL
SODIUM SERPL-SCNC: 137 MMOL/L (ref 132–146)
SPECIMEN DESCRIPTION: NORMAL
SPECIMEN DESCRIPTION: NORMAL
WBC OTHER # BLD: 9.8 K/UL (ref 4.5–11.5)

## 2025-03-28 PROCEDURE — 6370000000 HC RX 637 (ALT 250 FOR IP): Performed by: HOSPITALIST

## 2025-03-28 PROCEDURE — 97530 THERAPEUTIC ACTIVITIES: CPT

## 2025-03-28 PROCEDURE — 6370000000 HC RX 637 (ALT 250 FOR IP): Performed by: INTERNAL MEDICINE

## 2025-03-28 PROCEDURE — 6370000000 HC RX 637 (ALT 250 FOR IP)

## 2025-03-28 PROCEDURE — 6370000000 HC RX 637 (ALT 250 FOR IP): Performed by: STUDENT IN AN ORGANIZED HEALTH CARE EDUCATION/TRAINING PROGRAM

## 2025-03-28 PROCEDURE — 99232 SBSQ HOSP IP/OBS MODERATE 35: CPT | Performed by: STUDENT IN AN ORGANIZED HEALTH CARE EDUCATION/TRAINING PROGRAM

## 2025-03-28 PROCEDURE — 6360000002 HC RX W HCPCS: Performed by: HOSPITALIST

## 2025-03-28 PROCEDURE — 94640 AIRWAY INHALATION TREATMENT: CPT

## 2025-03-28 PROCEDURE — 82962 GLUCOSE BLOOD TEST: CPT

## 2025-03-28 PROCEDURE — 36415 COLL VENOUS BLD VENIPUNCTURE: CPT

## 2025-03-28 PROCEDURE — 74018 RADEX ABDOMEN 1 VIEW: CPT

## 2025-03-28 PROCEDURE — 99223 1ST HOSP IP/OBS HIGH 75: CPT | Performed by: NURSE PRACTITIONER

## 2025-03-28 PROCEDURE — 76937 US GUIDE VASCULAR ACCESS: CPT

## 2025-03-28 PROCEDURE — 97535 SELF CARE MNGMENT TRAINING: CPT

## 2025-03-28 PROCEDURE — 2500000003 HC RX 250 WO HCPCS

## 2025-03-28 PROCEDURE — 1200000000 HC SEMI PRIVATE

## 2025-03-28 PROCEDURE — 99232 SBSQ HOSP IP/OBS MODERATE 35: CPT | Performed by: NURSE PRACTITIONER

## 2025-03-28 PROCEDURE — 80048 BASIC METABOLIC PNL TOTAL CA: CPT

## 2025-03-28 PROCEDURE — 94669 MECHANICAL CHEST WALL OSCILL: CPT

## 2025-03-28 PROCEDURE — 85025 COMPLETE CBC W/AUTO DIFF WBC: CPT

## 2025-03-28 PROCEDURE — 99232 SBSQ HOSP IP/OBS MODERATE 35: CPT

## 2025-03-28 RX ORDER — FLUTICASONE PROPIONATE 50 MCG
1 SPRAY, SUSPENSION (ML) NASAL 2 TIMES DAILY
Status: DISCONTINUED | OUTPATIENT
Start: 2025-03-28 | End: 2025-03-30 | Stop reason: HOSPADM

## 2025-03-28 RX ORDER — POLYETHYLENE GLYCOL 3350 17 G/17G
17 POWDER, FOR SOLUTION ORAL DAILY
Status: DISCONTINUED | OUTPATIENT
Start: 2025-03-28 | End: 2025-03-30 | Stop reason: HOSPADM

## 2025-03-28 RX ORDER — TRAMADOL HYDROCHLORIDE 50 MG/1
50 TABLET ORAL EVERY 6 HOURS PRN
Qty: 10 TABLET | Refills: 0 | Status: SHIPPED | OUTPATIENT
Start: 2025-03-28 | End: 2025-03-31

## 2025-03-28 RX ORDER — DIPHENHYDRAMINE HCL 25 MG
25 TABLET ORAL EVERY 6 HOURS PRN
Qty: 10 TABLET | Refills: 0 | Status: SHIPPED | OUTPATIENT
Start: 2025-03-28 | End: 2025-04-04

## 2025-03-28 RX ORDER — NICOTINE 21 MG/24HR
1 PATCH, TRANSDERMAL 24 HOURS TRANSDERMAL DAILY
Qty: 30 PATCH | Refills: 3 | Status: SHIPPED | OUTPATIENT
Start: 2025-03-29

## 2025-03-28 RX ORDER — LIDOCAINE 4 G/G
1 PATCH TOPICAL DAILY
Qty: 14 EACH | Refills: 0 | Status: SHIPPED | OUTPATIENT
Start: 2025-03-29 | End: 2025-04-12

## 2025-03-28 RX ORDER — BUMETANIDE 1 MG/1
1 TABLET ORAL DAILY
Qty: 30 TABLET | Refills: 3 | Status: SHIPPED | OUTPATIENT
Start: 2025-03-29

## 2025-03-28 RX ORDER — POLYETHYLENE GLYCOL 3350 17 G/17G
17 POWDER, FOR SOLUTION ORAL DAILY
Qty: 527 G | Refills: 1 | Status: SHIPPED | OUTPATIENT
Start: 2025-03-29 | End: 2025-05-30

## 2025-03-28 RX ORDER — SENNA AND DOCUSATE SODIUM 50; 8.6 MG/1; MG/1
2 TABLET, FILM COATED ORAL DAILY
Qty: 60 TABLET | Refills: 0 | Status: SHIPPED | OUTPATIENT
Start: 2025-03-29 | End: 2025-04-28

## 2025-03-28 RX ORDER — BISACODYL 10 MG
10 SUPPOSITORY, RECTAL RECTAL PRN
Status: DISCONTINUED | OUTPATIENT
Start: 2025-03-28 | End: 2025-03-30 | Stop reason: HOSPADM

## 2025-03-28 RX ORDER — SENNA AND DOCUSATE SODIUM 50; 8.6 MG/1; MG/1
2 TABLET, FILM COATED ORAL DAILY
Status: DISCONTINUED | OUTPATIENT
Start: 2025-03-28 | End: 2025-03-30 | Stop reason: HOSPADM

## 2025-03-28 RX ORDER — DIPHENHYDRAMINE HCL 25 MG
25 TABLET ORAL EVERY 6 HOURS PRN
Status: DISCONTINUED | OUTPATIENT
Start: 2025-03-28 | End: 2025-03-30 | Stop reason: HOSPADM

## 2025-03-28 RX ORDER — GUAIFENESIN/DEXTROMETHORPHAN 100-10MG/5
5 SYRUP ORAL EVERY 4 HOURS PRN
Qty: 120 ML | Refills: 0 | Status: SHIPPED | OUTPATIENT
Start: 2025-03-28 | End: 2025-04-07

## 2025-03-28 RX ORDER — DOXYCYCLINE 100 MG/1
100 CAPSULE ORAL EVERY 12 HOURS SCHEDULED
Qty: 3 CAPSULE | Refills: 0 | Status: SHIPPED | OUTPATIENT
Start: 2025-03-28 | End: 2025-03-30

## 2025-03-28 RX ADMIN — MINOXIDIL 2.5 MG: 2.5 TABLET ORAL at 20:49

## 2025-03-28 RX ADMIN — IPRATROPIUM BROMIDE AND ALBUTEROL SULFATE 1 DOSE: 2.5; .5 SOLUTION RESPIRATORY (INHALATION) at 12:29

## 2025-03-28 RX ADMIN — DOCUSATE SODIUM 100 MG: 100 CAPSULE, LIQUID FILLED ORAL at 08:35

## 2025-03-28 RX ADMIN — DULOXETINE HYDROCHLORIDE 60 MG: 60 CAPSULE, DELAYED RELEASE ORAL at 08:25

## 2025-03-28 RX ADMIN — DOXYCYCLINE HYCLATE 100 MG: 100 CAPSULE ORAL at 20:47

## 2025-03-28 RX ADMIN — ASPIRIN 81 MG: 81 TABLET, COATED ORAL at 08:25

## 2025-03-28 RX ADMIN — TIZANIDINE 4 MG: 4 TABLET ORAL at 20:47

## 2025-03-28 RX ADMIN — IPRATROPIUM BROMIDE AND ALBUTEROL SULFATE 1 DOSE: 2.5; .5 SOLUTION RESPIRATORY (INHALATION) at 19:50

## 2025-03-28 RX ADMIN — DICYCLOMINE HYDROCHLORIDE 10 MG: 10 CAPSULE ORAL at 20:47

## 2025-03-28 RX ADMIN — PANTOPRAZOLE SODIUM 40 MG: 40 TABLET, DELAYED RELEASE ORAL at 08:25

## 2025-03-28 RX ADMIN — DICYCLOMINE HYDROCHLORIDE 10 MG: 10 CAPSULE ORAL at 08:25

## 2025-03-28 RX ADMIN — POLYETHYLENE GLYCOL 3350 17 G: 17 POWDER, FOR SOLUTION ORAL at 14:02

## 2025-03-28 RX ADMIN — SENNOSIDES AND DOCUSATE SODIUM 2 TABLET: 50; 8.6 TABLET ORAL at 14:02

## 2025-03-28 RX ADMIN — INSULIN LISPRO 2 UNITS: 100 INJECTION, SOLUTION INTRAVENOUS; SUBCUTANEOUS at 20:46

## 2025-03-28 RX ADMIN — Medication 2000 UNITS: at 08:25

## 2025-03-28 RX ADMIN — BUMETANIDE 1 MG: 1 TABLET ORAL at 08:25

## 2025-03-28 RX ADMIN — INSULIN LISPRO 2 UNITS: 100 INJECTION, SOLUTION INTRAVENOUS; SUBCUTANEOUS at 17:19

## 2025-03-28 RX ADMIN — SACUBITRIL AND VALSARTAN 1 TABLET: 49; 51 TABLET, FILM COATED ORAL at 20:48

## 2025-03-28 RX ADMIN — TRAZODONE HYDROCHLORIDE 150 MG: 50 TABLET ORAL at 20:46

## 2025-03-28 RX ADMIN — INSULIN GLARGINE 10 UNITS: 100 INJECTION, SOLUTION SUBCUTANEOUS at 08:29

## 2025-03-28 RX ADMIN — OXYBUTYNIN CHLORIDE 15 MG: 5 TABLET, EXTENDED RELEASE ORAL at 20:49

## 2025-03-28 RX ADMIN — DOXYCYCLINE HYCLATE 100 MG: 100 CAPSULE ORAL at 08:25

## 2025-03-28 RX ADMIN — INSULIN LISPRO 1 UNITS: 100 INJECTION, SOLUTION INTRAVENOUS; SUBCUTANEOUS at 12:47

## 2025-03-28 RX ADMIN — FLUTICASONE PROPIONATE 1 SPRAY: 50 SPRAY, METERED NASAL at 14:39

## 2025-03-28 RX ADMIN — PREDNISONE 40 MG: 20 TABLET ORAL at 08:25

## 2025-03-28 RX ADMIN — TRAMADOL HYDROCHLORIDE 50 MG: 50 TABLET, COATED ORAL at 23:21

## 2025-03-28 RX ADMIN — ATORVASTATIN CALCIUM 10 MG: 10 TABLET, FILM COATED ORAL at 08:25

## 2025-03-28 RX ADMIN — MICONAZOLE NITRATE: 20 POWDER TOPICAL at 08:33

## 2025-03-28 RX ADMIN — CETIRIZINE HYDROCHLORIDE 10 MG: 10 TABLET, FILM COATED ORAL at 08:25

## 2025-03-28 RX ADMIN — Medication 1 TABLET: at 08:25

## 2025-03-28 RX ADMIN — MINOXIDIL 2.5 MG: 2.5 TABLET ORAL at 08:26

## 2025-03-28 RX ADMIN — SACUBITRIL AND VALSARTAN 1 TABLET: 49; 51 TABLET, FILM COATED ORAL at 08:26

## 2025-03-28 RX ADMIN — ARFORMOTEROL TARTRATE: 15 SOLUTION RESPIRATORY (INHALATION) at 07:56

## 2025-03-28 RX ADMIN — HYDRALAZINE HYDROCHLORIDE 100 MG: 50 TABLET ORAL at 08:26

## 2025-03-28 RX ADMIN — FLUTICASONE PROPIONATE 1 SPRAY: 50 SPRAY, METERED NASAL at 20:48

## 2025-03-28 RX ADMIN — TRAMADOL HYDROCHLORIDE 50 MG: 50 TABLET, COATED ORAL at 14:02

## 2025-03-28 RX ADMIN — ISOSORBIDE MONONITRATE 60 MG: 30 TABLET, EXTENDED RELEASE ORAL at 08:26

## 2025-03-28 RX ADMIN — ARFORMOTEROL TARTRATE: 15 SOLUTION RESPIRATORY (INHALATION) at 19:50

## 2025-03-28 RX ADMIN — HYDRALAZINE HYDROCHLORIDE 100 MG: 50 TABLET ORAL at 20:46

## 2025-03-28 RX ADMIN — IPRATROPIUM BROMIDE AND ALBUTEROL SULFATE 1 DOSE: 2.5; .5 SOLUTION RESPIRATORY (INHALATION) at 07:56

## 2025-03-28 ASSESSMENT — PAIN DESCRIPTION - DESCRIPTORS: DESCRIPTORS: ACHING;SORE;DISCOMFORT;STABBING

## 2025-03-28 ASSESSMENT — PAIN DESCRIPTION - LOCATION
LOCATION: ABDOMEN
LOCATION: LEG;BACK;ABDOMEN

## 2025-03-28 ASSESSMENT — PAIN SCALES - GENERAL
PAINLEVEL_OUTOF10: 7
PAINLEVEL_OUTOF10: 10

## 2025-03-28 ASSESSMENT — PAIN DESCRIPTION - ORIENTATION: ORIENTATION: LEFT

## 2025-03-28 ASSESSMENT — PAIN - FUNCTIONAL ASSESSMENT: PAIN_FUNCTIONAL_ASSESSMENT: ACTIVITIES ARE NOT PREVENTED

## 2025-03-28 NOTE — PROCEDURES
[facility] Neurodiagnostic Report    MRN: [id number]  PATIENT NAME: Steph Gonzáles  DATE OF REPORT: 3/28/2025   DATE OF SERVICE: 3/27/2025  PHYSICIAN NAME: Jose Gann DO  STUDY ORDERED BY: Ivon Ott APRN - CNP       Patient's : 1955  Patient's Age: 69 y.o.  Gender: female    PROCEDURE: Routine EEG with video      Clinical Interpretation:   This abnormal study showed evidence of:    Mild nonspecific cerebral dysfunction of the right frontal lobe    Structural abnormalities should be considered for the findings above and appropriate imaging obtained if clinically indicated.     No seizures or epileptiform discharges were noted during this study.     __________________________  Electronically signed by: Jose Gann DO, 3/28/2025 1:01 PM      Patient Clinical Information   Reason for Study: The patient is undergoing evaluation for an episode of transient neurologic symptoms  Patient State: Awake  Primary neurological diagnosis: Seizure  Primary indication for monitoring: Risk stratification    Pertinent Medications and Treatments    diphenhydrAMINE     traMADol     dicyclomine     DULoxetine     oxyBUTYnin     tiZANidine     traZODone     Sedatives administered: No  Intubated: No  Pharmacological paralytic: No    Reporting Period  Start of Study: 1326, 3/27/2025  End of Study:  135, 3/27/2025      EEG Description  Digital video and scalp EEG monitoring was performed using the standard protocol for this laboratory. Scalp electrodes were applied in the international 10/20 system. Multiple digital montage arrangements were utilized for evaluation. EKG and video were recorded.     Background:      Occipital rhythm (posterior dominant rhythm or PDR): Present  Frequency: 8.5 Hz  Voltage: Medium  Organization: good   Reactivity to eye opening/closure: Good    Drowsiness: Present - normal  Sleep: Absent    Technical and Activation Procedures:  Hyperventilation: Not

## 2025-03-28 NOTE — PLAN OF CARE
Problem: Skin/Tissue Integrity  Goal: Skin integrity remains intact  Description: 1.  Monitor for areas of redness and/or skin breakdown  2.  Assess vascular access sites hourly  3.  Every 4-6 hours minimum:  Change oxygen saturation probe site  4.  Every 4-6 hours:  If on nasal continuous positive airway pressure, respiratory therapy assess nares and determine need for appliance change or resting period  Outcome: Progressing     Problem: Safety - Adult  Goal: Free from fall injury  Outcome: Progressing     Problem: Chronic Conditions and Co-morbidities  Goal: Patient's chronic conditions and co-morbidity symptoms are monitored and maintained or improved  Outcome: Progressing

## 2025-03-28 NOTE — CARE COORDINATION
Plan is home with sister and [facility] when medically ready. Home health care orders are in. EEG read pending per neurology. Per GI note today, Possible C-Scope  Tuesday or as OP.  Vascular surgery consulted for Peripheral Arterial Disease, RLE pain. Await input & plan. Met with internal med who said if no intervention from vascular surgery patient can discharge and have her C scope outpatient. Patient has home oxygen through [employer] (3.5L) while sleeping.  PT/OT [facility] score 16/24 today. Patient is also requesting a 3 in 1 for discharge as well as a wheelchair. Referral made to Jacquie at [facility]. She will deliver to patient's room prior to discharge. Patient said her daughter will transport her home at time of discharge.   Macrina Conrad RN   [phone]

## 2025-03-28 NOTE — DISCHARGE SUMMARY
Hospitalist Discharge Summary    Patient ID: Steph Gonzáles   Patient : 1955  Patient's PCP: Ulises Skinner DO    Admit Date: 3/23/2025   Admitting Physician: Miguel Angel Marinelli MD    Discharge Date: 3/30/2025   Discharge Physician: Baron Ballard MD   Discharge Condition: Stable  Discharge Disposition: Home      Hospital course in brief:  (Please refer to daily progress notes for a comprehensive review of the hospitalization by requesting medical records)    This is a 69-year-old lady with past medical history of class II obesity, type 2 diabetes mellitus, hypertension, hyperlipidemia, CKD stage IIIb, COPD on home oxygen, CAD with history of CABG who presented to [facility] ED on 3/23/2025 for increasing shortness of breath.  She also did complain of right ear hurting for more than 1 month and has not been able to see ENT.  She reported that her abdominal pain has been chronic.  Cardiac stent was placed last year.  She also complained of burning urination for the past week.  Denies any fever, chills, nausea, vomiting or diarrhea.  She was empirically started on Rocephin and doxycycline for CAP coverage.  She also did have concerns for bloody stool with history of IBS.  GI service was consulted.  GI recommended that her respiratory status needs to improve prior to going for colonoscopy.  Cardiology and pulmonology were also consulted.  Pulmonary cleared her for colonoscopy.  She was briefly on IV Bumex which was changed to oral Bumex.  Follow-up with outpatient cardiology.  Due to concerns of shaking and seizure like disorder neurology was consulted.  MRI brain and EEG was ordered.  Within normal limit.  Vascular surgery was also consulted for her peripheral artery disease, ARY was completed as outpatient previously.  Follow-up with Dr. Hernandez as outpatient.  Due to scheduling issues colonoscopy could not be completed till Tuesday.  Discussed with GI colonoscopy can be done as

## 2025-03-28 NOTE — CARE COORDINATION
Discharge order noted. Per vascular surgery note today, patient's symptoms are not consistent with arterial occlusive disease. It seems her pain is coming from her right hip or back. Her foot pain is in both feet and may be due to diabetic neuropathy. No plans for vascular surgery intervention. Follow up with Dr. Hernandez as scheduled as outpatient. Ceci from [facility] notified of patient discharging home today. Home health care orders are in. Patient said her daughter will transport her home today.   Macrina Conrad RN   [phone]

## 2025-03-29 LAB
EKG ATRIAL RATE: 76 BPM
EKG P AXIS: 55 DEGREES
EKG P-R INTERVAL: 174 MS
EKG Q-T INTERVAL: 456 MS
EKG QRS DURATION: 128 MS
EKG QTC CALCULATION (BAZETT): 513 MS
EKG R AXIS: -25 DEGREES
EKG T AXIS: 63 DEGREES
EKG VENTRICULAR RATE: 76 BPM
GLUCOSE BLD-MCNC: 127 MG/DL (ref 74–99)
GLUCOSE BLD-MCNC: 136 MG/DL (ref 74–99)
GLUCOSE BLD-MCNC: 197 MG/DL (ref 74–99)
GLUCOSE BLD-MCNC: 246 MG/DL (ref 74–99)

## 2025-03-29 PROCEDURE — 6370000000 HC RX 637 (ALT 250 FOR IP): Performed by: HOSPITALIST

## 2025-03-29 PROCEDURE — 94660 CPAP INITIATION&MGMT: CPT

## 2025-03-29 PROCEDURE — 82962 GLUCOSE BLOOD TEST: CPT

## 2025-03-29 PROCEDURE — 6370000000 HC RX 637 (ALT 250 FOR IP)

## 2025-03-29 PROCEDURE — 94640 AIRWAY INHALATION TREATMENT: CPT

## 2025-03-29 PROCEDURE — 6370000000 HC RX 637 (ALT 250 FOR IP): Performed by: STUDENT IN AN ORGANIZED HEALTH CARE EDUCATION/TRAINING PROGRAM

## 2025-03-29 PROCEDURE — 99232 SBSQ HOSP IP/OBS MODERATE 35: CPT | Performed by: STUDENT IN AN ORGANIZED HEALTH CARE EDUCATION/TRAINING PROGRAM

## 2025-03-29 PROCEDURE — 1200000000 HC SEMI PRIVATE

## 2025-03-29 PROCEDURE — 6360000002 HC RX W HCPCS: Performed by: HOSPITALIST

## 2025-03-29 PROCEDURE — 2700000000 HC OXYGEN THERAPY PER DAY

## 2025-03-29 PROCEDURE — 6370000000 HC RX 637 (ALT 250 FOR IP): Performed by: INTERNAL MEDICINE

## 2025-03-29 RX ORDER — LACTULOSE 10 G/15ML
20 SOLUTION ORAL 3 TIMES DAILY
Qty: 450 ML | Refills: 0 | Status: SHIPPED | OUTPATIENT
Start: 2025-03-29 | End: 2025-04-03

## 2025-03-29 RX ORDER — LACTULOSE 10 G/15ML
20 SOLUTION ORAL 3 TIMES DAILY
Status: DISCONTINUED | OUTPATIENT
Start: 2025-03-29 | End: 2025-03-30 | Stop reason: HOSPADM

## 2025-03-29 RX ADMIN — SACUBITRIL AND VALSARTAN 1 TABLET: 49; 51 TABLET, FILM COATED ORAL at 21:49

## 2025-03-29 RX ADMIN — MINOXIDIL 2.5 MG: 2.5 TABLET ORAL at 08:13

## 2025-03-29 RX ADMIN — INSULIN GLARGINE 10 UNITS: 100 INJECTION, SOLUTION SUBCUTANEOUS at 08:12

## 2025-03-29 RX ADMIN — ATORVASTATIN CALCIUM 10 MG: 10 TABLET, FILM COATED ORAL at 08:14

## 2025-03-29 RX ADMIN — FLUTICASONE PROPIONATE 1 SPRAY: 50 SPRAY, METERED NASAL at 21:50

## 2025-03-29 RX ADMIN — LACTULOSE 20 G: 20 SOLUTION ORAL at 21:48

## 2025-03-29 RX ADMIN — DICYCLOMINE HYDROCHLORIDE 10 MG: 10 CAPSULE ORAL at 08:12

## 2025-03-29 RX ADMIN — FLUTICASONE PROPIONATE 1 SPRAY: 50 SPRAY, METERED NASAL at 08:14

## 2025-03-29 RX ADMIN — DOXYCYCLINE HYCLATE 100 MG: 100 CAPSULE ORAL at 21:49

## 2025-03-29 RX ADMIN — DICYCLOMINE HYDROCHLORIDE 10 MG: 10 CAPSULE ORAL at 21:49

## 2025-03-29 RX ADMIN — DOXYCYCLINE HYCLATE 100 MG: 100 CAPSULE ORAL at 08:14

## 2025-03-29 RX ADMIN — Medication 1 TABLET: at 08:19

## 2025-03-29 RX ADMIN — SACUBITRIL AND VALSARTAN 1 TABLET: 49; 51 TABLET, FILM COATED ORAL at 08:12

## 2025-03-29 RX ADMIN — SENNOSIDES AND DOCUSATE SODIUM 2 TABLET: 50; 8.6 TABLET ORAL at 08:13

## 2025-03-29 RX ADMIN — TRAZODONE HYDROCHLORIDE 150 MG: 50 TABLET ORAL at 21:48

## 2025-03-29 RX ADMIN — POLYETHYLENE GLYCOL 3350 17 G: 17 POWDER, FOR SOLUTION ORAL at 08:12

## 2025-03-29 RX ADMIN — ASPIRIN 81 MG: 81 TABLET, COATED ORAL at 08:13

## 2025-03-29 RX ADMIN — MINOXIDIL 2.5 MG: 2.5 TABLET ORAL at 21:49

## 2025-03-29 RX ADMIN — ISOSORBIDE MONONITRATE 60 MG: 30 TABLET, EXTENDED RELEASE ORAL at 08:14

## 2025-03-29 RX ADMIN — TRAMADOL HYDROCHLORIDE 50 MG: 50 TABLET, COATED ORAL at 21:49

## 2025-03-29 RX ADMIN — MICONAZOLE NITRATE: 20 POWDER TOPICAL at 08:16

## 2025-03-29 RX ADMIN — ARFORMOTEROL TARTRATE: 15 SOLUTION RESPIRATORY (INHALATION) at 19:58

## 2025-03-29 RX ADMIN — Medication 2000 UNITS: at 08:19

## 2025-03-29 RX ADMIN — INSULIN LISPRO 1 UNITS: 100 INJECTION, SOLUTION INTRAVENOUS; SUBCUTANEOUS at 16:01

## 2025-03-29 RX ADMIN — HYDRALAZINE HYDROCHLORIDE 100 MG: 50 TABLET ORAL at 08:13

## 2025-03-29 RX ADMIN — ARFORMOTEROL TARTRATE: 15 SOLUTION RESPIRATORY (INHALATION) at 08:06

## 2025-03-29 RX ADMIN — DULOXETINE HYDROCHLORIDE 60 MG: 60 CAPSULE, DELAYED RELEASE ORAL at 08:13

## 2025-03-29 RX ADMIN — IPRATROPIUM BROMIDE AND ALBUTEROL SULFATE 1 DOSE: 2.5; .5 SOLUTION RESPIRATORY (INHALATION) at 08:06

## 2025-03-29 RX ADMIN — HYDRALAZINE HYDROCHLORIDE 100 MG: 50 TABLET ORAL at 21:49

## 2025-03-29 RX ADMIN — BUMETANIDE 1 MG: 1 TABLET ORAL at 08:14

## 2025-03-29 RX ADMIN — OXYBUTYNIN CHLORIDE 15 MG: 5 TABLET, EXTENDED RELEASE ORAL at 21:49

## 2025-03-29 RX ADMIN — CETIRIZINE HYDROCHLORIDE 10 MG: 10 TABLET, FILM COATED ORAL at 08:14

## 2025-03-29 RX ADMIN — IPRATROPIUM BROMIDE AND ALBUTEROL SULFATE 1 DOSE: 2.5; .5 SOLUTION RESPIRATORY (INHALATION) at 19:58

## 2025-03-29 RX ADMIN — TIZANIDINE 4 MG: 4 TABLET ORAL at 21:48

## 2025-03-29 RX ADMIN — PANTOPRAZOLE SODIUM 40 MG: 40 TABLET, DELAYED RELEASE ORAL at 06:26

## 2025-03-29 RX ADMIN — TRAMADOL HYDROCHLORIDE 50 MG: 50 TABLET, COATED ORAL at 10:28

## 2025-03-29 RX ADMIN — IPRATROPIUM BROMIDE AND ALBUTEROL SULFATE 1 DOSE: 2.5; .5 SOLUTION RESPIRATORY (INHALATION) at 15:48

## 2025-03-29 RX ADMIN — IPRATROPIUM BROMIDE AND ALBUTEROL SULFATE 1 DOSE: 2.5; .5 SOLUTION RESPIRATORY (INHALATION) at 12:45

## 2025-03-29 ASSESSMENT — PULMONARY FUNCTION TESTS: PEFR_L/MIN: 145

## 2025-03-29 ASSESSMENT — PAIN SCALES - GENERAL
PAINLEVEL_OUTOF10: 6
PAINLEVEL_OUTOF10: 10

## 2025-03-29 ASSESSMENT — PAIN DESCRIPTION - LOCATION
LOCATION: BUTTOCKS;BACK
LOCATION: BACK;BUTTOCKS

## 2025-03-29 ASSESSMENT — PAIN DESCRIPTION - DESCRIPTORS: DESCRIPTORS: ACHING;DISCOMFORT;DULL

## 2025-03-29 ASSESSMENT — PAIN DESCRIPTION - ORIENTATION: ORIENTATION: MID;LOWER

## 2025-03-29 NOTE — CARE COORDINATION
3/29/2025social work transition of care planning  Dc order noted. Per floor rn,pt needs to have bm before dc home. DME has been delivered,pt's dtr to transport at dc. Sw notified [facility] of possible dc today. GI consult,per nursing.  Electronically signed by SINDHU Ruvalcaba on 3/29/2025 at 8:44 AM

## 2025-03-29 NOTE — PLAN OF CARE
Problem: Skin/Tissue Integrity  Goal: Skin integrity remains intact  Description: 1.  Monitor for areas of redness and/or skin breakdown  2.  Assess vascular access sites hourly  3.  Every 4-6 hours minimum:  Change oxygen saturation probe site  4.  Every 4-6 hours:  If on nasal continuous positive airway pressure, respiratory therapy assess nares and determine need for appliance change or resting period  3/29/2025 0938 by Angela Oleary RN  Outcome: Progressing  Flowsheets (Taken 3/29/2025 0936)  Skin Integrity Remains Intact: Monitor for areas of redness and/or skin breakdown     Problem: Safety - Adult  Goal: Free from fall injury  3/29/2025 0938 by Angela Oleary RN  Outcome: Progressing     Problem: Chronic Conditions and Co-morbidities  Goal: Patient's chronic conditions and co-morbidity symptoms are monitored and maintained or improved  3/29/2025 0938 by Angela Oleary RN  Outcome: Progressing     Problem: Discharge Planning  Goal: Discharge to home or other facility with appropriate resources  3/29/2025 0938 by Angela Oleary RN  Outcome: Progressing     Problem: Pain  Goal: Verbalizes/displays adequate comfort level or baseline comfort level  3/29/2025 0938 by Angela Oleary RN  Outcome: Progressing     Problem: ABCDS Injury Assessment  Goal: Absence of physical injury  3/29/2025 0938 by Angela Oleary RN  Outcome: Progressing

## 2025-03-30 VITALS
WEIGHT: 179 LBS | HEIGHT: 62 IN | TEMPERATURE: 98.3 F | BODY MASS INDEX: 32.94 KG/M2 | DIASTOLIC BLOOD PRESSURE: 74 MMHG | RESPIRATION RATE: 16 BRPM | SYSTOLIC BLOOD PRESSURE: 124 MMHG | HEART RATE: 82 BPM | OXYGEN SATURATION: 100 %

## 2025-03-30 LAB
GLUCOSE BLD-MCNC: 129 MG/DL (ref 74–99)
GLUCOSE BLD-MCNC: 130 MG/DL (ref 74–99)

## 2025-03-30 PROCEDURE — 6370000000 HC RX 637 (ALT 250 FOR IP)

## 2025-03-30 PROCEDURE — 82962 GLUCOSE BLOOD TEST: CPT

## 2025-03-30 PROCEDURE — 99239 HOSP IP/OBS DSCHRG MGMT >30: CPT | Performed by: STUDENT IN AN ORGANIZED HEALTH CARE EDUCATION/TRAINING PROGRAM

## 2025-03-30 PROCEDURE — 94640 AIRWAY INHALATION TREATMENT: CPT

## 2025-03-30 PROCEDURE — 6370000000 HC RX 637 (ALT 250 FOR IP): Performed by: HOSPITALIST

## 2025-03-30 PROCEDURE — 6370000000 HC RX 637 (ALT 250 FOR IP): Performed by: STUDENT IN AN ORGANIZED HEALTH CARE EDUCATION/TRAINING PROGRAM

## 2025-03-30 PROCEDURE — 2700000000 HC OXYGEN THERAPY PER DAY

## 2025-03-30 PROCEDURE — 94660 CPAP INITIATION&MGMT: CPT

## 2025-03-30 PROCEDURE — 6360000002 HC RX W HCPCS: Performed by: HOSPITALIST

## 2025-03-30 RX ADMIN — ARFORMOTEROL TARTRATE: 15 SOLUTION RESPIRATORY (INHALATION) at 09:13

## 2025-03-30 RX ADMIN — MINOXIDIL 2.5 MG: 2.5 TABLET ORAL at 07:44

## 2025-03-30 RX ADMIN — HYDRALAZINE HYDROCHLORIDE 100 MG: 50 TABLET ORAL at 07:43

## 2025-03-30 RX ADMIN — SENNOSIDES AND DOCUSATE SODIUM 2 TABLET: 50; 8.6 TABLET ORAL at 07:43

## 2025-03-30 RX ADMIN — POLYETHYLENE GLYCOL 3350 17 G: 17 POWDER, FOR SOLUTION ORAL at 07:44

## 2025-03-30 RX ADMIN — LACTULOSE 20 G: 20 SOLUTION ORAL at 07:44

## 2025-03-30 RX ADMIN — Medication 1 TABLET: at 07:43

## 2025-03-30 RX ADMIN — TRAMADOL HYDROCHLORIDE 50 MG: 50 TABLET, COATED ORAL at 05:27

## 2025-03-30 RX ADMIN — IPRATROPIUM BROMIDE AND ALBUTEROL SULFATE 1 DOSE: 2.5; .5 SOLUTION RESPIRATORY (INHALATION) at 09:13

## 2025-03-30 RX ADMIN — FLUTICASONE PROPIONATE 1 SPRAY: 50 SPRAY, METERED NASAL at 07:44

## 2025-03-30 RX ADMIN — MICONAZOLE NITRATE: 20 POWDER TOPICAL at 07:45

## 2025-03-30 RX ADMIN — ISOSORBIDE MONONITRATE 60 MG: 30 TABLET, EXTENDED RELEASE ORAL at 07:42

## 2025-03-30 RX ADMIN — BUMETANIDE 1 MG: 1 TABLET ORAL at 07:43

## 2025-03-30 RX ADMIN — INSULIN GLARGINE 10 UNITS: 100 INJECTION, SOLUTION SUBCUTANEOUS at 07:44

## 2025-03-30 RX ADMIN — DULOXETINE HYDROCHLORIDE 60 MG: 60 CAPSULE, DELAYED RELEASE ORAL at 07:43

## 2025-03-30 RX ADMIN — Medication 2000 UNITS: at 07:44

## 2025-03-30 RX ADMIN — DICYCLOMINE HYDROCHLORIDE 10 MG: 10 CAPSULE ORAL at 07:43

## 2025-03-30 RX ADMIN — PANTOPRAZOLE SODIUM 40 MG: 40 TABLET, DELAYED RELEASE ORAL at 05:27

## 2025-03-30 RX ADMIN — ATORVASTATIN CALCIUM 10 MG: 10 TABLET, FILM COATED ORAL at 07:43

## 2025-03-30 RX ADMIN — CETIRIZINE HYDROCHLORIDE 10 MG: 10 TABLET, FILM COATED ORAL at 07:43

## 2025-03-30 RX ADMIN — TRAMADOL HYDROCHLORIDE 50 MG: 50 TABLET, COATED ORAL at 11:50

## 2025-03-30 RX ADMIN — ASPIRIN 81 MG: 81 TABLET, COATED ORAL at 07:42

## 2025-03-30 RX ADMIN — SACUBITRIL AND VALSARTAN 1 TABLET: 49; 51 TABLET, FILM COATED ORAL at 07:43

## 2025-03-30 NOTE — CARE COORDINATION
Call received from Lois with [facility] .  [facility] orders not received prior to discharge.  Chart opened and reviewed and Hc orders written 3/27.  Return call placed and Lois also found the [facility] orders.  HC will call to arrange follow-up visits.          Electronically signed by Samara Morris RN on 3/30/25 at 2:09 PM EDT

## 2025-03-30 NOTE — PLAN OF CARE
Problem: Skin/Tissue Integrity  Goal: Skin integrity remains intact  Description: 1.  Monitor for areas of redness and/or skin breakdown  2.  Assess vascular access sites hourly  3.  Every 4-6 hours minimum:  Change oxygen saturation probe site  4.  Every 4-6 hours:  If on nasal continuous positive airway pressure, respiratory therapy assess nares and determine need for appliance change or resting period  3/30/2025 0911 by Angela Oleary RN  Outcome: Adequate for Discharge     Problem: Safety - Adult  Goal: Free from fall injury  3/30/2025 0911 by Angela Oleary RN  Outcome: Adequate for Discharge     Problem: Chronic Conditions and Co-morbidities  Goal: Patient's chronic conditions and co-morbidity symptoms are monitored and maintained or improved  3/30/2025 0911 by Angela Oleary RN  Outcome: Adequate for Discharge     Problem: Discharge Planning  Goal: Discharge to home or other facility with appropriate resources  3/30/2025 0911 by Angela Oleary RN  Outcome: Adequate for Discharge     Problem: Pain  Goal: Verbalizes/displays adequate comfort level or baseline comfort level  3/30/2025 0911 by Angela Oleary RN  Outcome: Adequate for Discharge     Problem: ABCDS Injury Assessment  Goal: Absence of physical injury  3/30/2025 0911 by Angela Oleary RN  Outcome: Adequate for Discharge

## 2025-03-30 NOTE — PROGRESS NOTES
Nico Steinberg M.D.,[facility]  Bobo Linder D.O., KALYANI., [facility]  Keshawn Jolly M.D.  Ana Lopez M.D.   James Delgado D.O.  Jose Lombardi M.D.         Daily Pulmonary Progress Note    Patient:  Steph Gonzáles 69 y.o. female MRN: [id number]            Synopsis     We are following patient for acute hypoxic respiratory failure, shortness of breath    \"CC\" shortness of breath    Code status: Full    Subjective      Patient was seen and examined.  Oxygen 3 L nasal cannula.  Prior baseline she only uses 3-3.5 L bedtime. Wore bipap overnight.   Patient still with multiple physical complaints abdominal discomfort, R neck pain, cough.  Await input from ENT service. CT neck No mass or abscess seen there is a large right mastoid effusion with opacification of right middle ear cavity.    CT chest with cardiomegaly, septal thickening mosaic is empty her lungs interstitial pulmonary edema pattern. EEG and MRI brain completed.       Peak flows 3/26/2025     Predicted  Peak Flow 657 lpm     Peak Flow before bronchodilators 150 lpm, which is 23 % predicted     Peak Flow after bronchodilators 175 lpm, which is 27 % predicted.        Review of Systems:  Constitutional: Denies fever, weight loss, night sweats, and fatigue  Skin: Denies pigmentation, dark lesions, and rashes   HEENT: Denies hearing loss, tinnitus, ear drainage, epistaxis, sore throat, and hoarseness.  Right neck and ear fullness/pain  Cardiovascular: Denies palpitations, chest pain, and chest pressure.  Respiratory: Dyspnea, hypoxia  Gastrointestinal: Denies nausea, vomiting, poor appetite, diarrhea, heartburn or reflux  Genitourinary: Denies dysuria, frequency, urgency or hematuria  Musculoskeletal: Denies myalgias, muscle weakness, and bone pain leg pain, peripheral vascular disease with prior right leg wound  Neurological: Denies dizziness, vertigo, headache, and focal weakness  Psychological: Denies anxiety and depression  Endocrine: Denies 
       [facility] Hospitalist Progress Note    Admitting Date and Time: 3/23/2025  5:22 PM  Admit Dx: Acute on chronic respiratory failure with hypoxia [J96.21]  Pneumonia of both lungs due to infectious organism, unspecified part of lung [J18.9]    Synopsis:    Ms. Steph Gonzáles, a 69 y.o. year old female  with PMH of class II obesity, T2DM, HTN, HLD, CKD stage IIIb, COPD on home oxygen, CAD with history of CABG     Pt presented to ED for evaluation of shortness of breath. Pt has multiple complaints on my visit, complains of right ear hurting for 1 month has not been able to get ENT, and right side back and abdominal pain chronic, reports she had a cardiac stent placed last year, she has no active chest pain, she reports seeing blood in her stool, for the past few days, she also complains of burning urination for the past week.  She denies fever, chills, nausea vomiting or diarrhea.    3/24: Added doxycycline IV to cover for CAP.  Patient reporting bloody stools with history of IBS.  GI consulted.    3/25 patient very short of breath on BiPAP.  Pulmonary and cardiology service consulted.  Tentative plan for colonoscopy once her respiratory status improved.    3/26 patient's breathing is about the same, currently on 4 L oxygen via nasal cannula, neurology recommended MRI brain and EEG    3/27 breathing appears to be better however she feels quite uncomfortable on her bed and requesting bariatric air mattress.  MRI reviewed.  No acute finding.  EEG pending      Subjective:  Patient is being followed for Acute on chronic respiratory failure with hypoxia [J96.21]  Pneumonia of both lungs due to infectious organism, unspecified part of lung [J18.9]     Patient seen and examined at bedside this morning.    Complaining of shortness of breath, cough, mucus production, back pain  Requesting for bariatric air mattress, reports constipation    ROS: denies fever, chills, cp, , n/v, HA unless stated above.      
       [facility] Hospitalist Progress Note    Admitting Date and Time: 3/23/2025  5:22 PM  Admit Dx: Acute on chronic respiratory failure with hypoxia [J96.21]  Pneumonia of both lungs due to infectious organism, unspecified part of lung [J18.9]    Synopsis:    Ms. Steph Gonzáles, a 69 y.o. year old female  with PMH of class II obesity, T2DM, HTN, HLD, CKD stage IIIb, COPD on home oxygen, CAD with history of CABG     Pt presented to ED for evaluation of shortness of breath. Pt has multiple complaints on my visit, complains of right ear hurting for 1 month has not been able to get ENT, and right side back and abdominal pain chronic, reports she had a cardiac stent placed last year, she has no active chest pain, she reports seeing blood in her stool, for the past few days, she also complains of burning urination for the past week.  She denies fever, chills, nausea vomiting or diarrhea.    3/24: Added doxycycline IV to cover for CAP.  Patient reporting bloody stools with history of IBS.  GI consulted.    3/25 patient very short of breath on BiPAP.  Pulmonary and cardiology service consulted.  Tentative plan for colonoscopy once her respiratory status improved.    3/26 patient's breathing is about the same, currently on 4 L oxygen via nasal cannula, neurology recommended MRI brain and EEG    Subjective:  Patient is being followed for Acute on chronic respiratory failure with hypoxia [J96.21]  Pneumonia of both lungs due to infectious organism, unspecified part of lung [J18.9]     Patient seen and examined at bedside this morning.    Complaining of shortness of breath, cough, mucus production, back pain      ROS: denies fever, chills, cp, , n/v, HA unless stated above.      sodium chloride flush  5-40 mL IntraVENous 2 times per day    lidocaine 1 % injection  50 mg IntraDERmal Once    LORazepam  0.26 mg IntraVENous Once    bumetanide  1 mg Oral Daily    miconazole   Topical BID    cefTRIAXone (ROCEPHIN) IV  1,000 
       [facility] Hospitalist Progress Note    Admitting Date and Time: 3/23/2025  5:22 PM  Admit Dx: Acute on chronic respiratory failure with hypoxia [J96.21]  Pneumonia of both lungs due to infectious organism, unspecified part of lung [J18.9]    Synopsis:    Ms. Steph Gonzáles, a 69 y.o. year old female  with PMH of class II obesity, T2DM, HTN, HLD, CKD stage IIIb, COPD on home oxygen, CAD with history of CABG     Pt presented to ED for evaluation of shortness of breath. Pt has multiple complaints on my visit, complains of right ear hurting for 1 month has not been able to get ENT, and right side back and abdominal pain chronic, reports she had a cardiac stent placed last year, she has no active chest pain, she reports seeing blood in her stool, for the past few days, she also complains of burning urination for the past week.  She denies fever, chills, nausea vomiting or diarrhea.    3/24: Added doxycycline IV to cover for CAP.  Patient reporting bloody stools with history of IBS.  GI consulted.    3/25 patient very short of breath on BiPAP.  Pulmonary and cardiology service consulted.  Tentative plan for colonoscopy once her respiratory status improved.    Subjective:  Patient is being followed for Acute on chronic respiratory failure with hypoxia [J96.21]  Pneumonia of both lungs due to infectious organism, unspecified part of lung [J18.9]     Patient seen and examined at bedside this morning.  Multiple complaints such as shortness of breath, bloody stools, and reports of multiple allergies.  She is complaining of back pain but she is allergic to almost all pain medications that can be given in the hospital.    ROS: denies fever, chills, cp, sob, n/v, HA unless stated above.      [START ON 3/26/2025] bumetanide  1 mg Oral Daily    cefTRIAXone (ROCEPHIN) IV  1,000 mg IntraVENous Daily    nicotine  1 patch TransDERmal Daily    doxycycline (VIBRAMYCIN) IV  100 mg IntraVENous Q12H    fluticasone  2 spray Each 
   03/24/25 2048   NIV Type   $NIV $Daily Charge   NIV Started/Stopped On   Equipment Type v60   Mode CPAP   Mask Type Full face mask   Mask Size Medium   Assessment   Level of Consciousness 0   Comfort Level Good   Using Accessory Muscles No   Mask Compliance Good   Skin Assessment Clean, dry, & intact   Skin Protection for O2 Device Yes   Orientation Middle   Location Nose   Intervention(s) Skin Barrier   Settings/Measurements   CPAP/EPAP 8 cmH2O   Vt (Measured) 329 mL   FiO2  50 %   Minute Volume (L/min) 6.6 Liters   Mask Leak (lpm) 47 lpm   Patient's Home Machine No   Alarm Settings   Alarms On Y   Low Pressure (cmH2O) 6 cmH2O   High Pressure (cmH2O) 45 cmH2O   RR Low (bpm) 8   RR High (bpm) 45 br/min     Date: 3/24/2025    Time: 8:50 PM    Patient Placed On BIPAP/CPAP/ Non-Invasive Ventilation?  Yes    If no must comment.  Facial area red/color change? No           If YES are Blister/Lesion present?No   If yes must notify nursing staff  BIPAP/CPAP skin barrier?  Yes    Skin barrier type:mepilexlite       Comments:        Keshia Jimenez RCP  
   03/27/25 2035   NIV Type   NIV Started/Stopped On   Equipment Type v60   Mode AVAPS   Mask Type Full face mask   Mask Size Medium   Assessment   Pulse 81   Heart Rate Source Monitor   Respirations 23   Level of Consciousness 0   Comfort Level Good   Using Accessory Muscles No   Mask Compliance Good   Skin Assessment Clean, dry, & intact   Skin Protection for O2 Device Yes   Orientation Middle   Location Nose   Intervention(s) Skin Barrier   Breath Sounds   Respiratory Pattern Regular   Settings/Measurements   PIP Observed 18 cm H20   CPAP/EPAP 8 cmH2O   Vt (Set, mL) 500 mL   Vt (Measured) 302 mL   Rate Ordered 14   O2 Flow Rate (L/min) 3 L/min   FiO2  50 %   Minute Volume (L/min) 7 Liters   Mask Leak (lpm) 58 lpm   Patient's Home Machine No   Alarm Settings   Alarms On Y   Low Pressure (cmH2O) 6 cmH2O   High Pressure (cmH2O) 45 cmH2O   RR Low (bpm) (S)  12   RR High (bpm) 45 br/min       
  Gastroenterology, Hepatology, &  Advanced Endoscopy    Progress Note      Reason for Consult: blood in stool, hx of IBS     HPI:   Steph Gonzáles is a 69 y.o. female w/ PMH of  has a past medical history of Acute kidney injury superimposed on CKD, Asthma, Atherosclerosis of native artery of left leg with rest pain (HCC), 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), Atrioventricular block, CAD (coronary artery disease), Cellulitis and abscess of trunk, Cerebellar infarct (HCC), Chronic back pain, Chronic kidney disease, Chronic systolic congestive heart failure (HCC), Chronic venous insufficiency, COPD (chronic obstructive pulmonary disease) (HCC), COVID-19, Depression, Diabetes mellitus (HCC), Diabetic neuropathy (HCC), Diverticulosis, Fatty liver, Glaucoma, open angle, Hepatic encephalopathy (HCC), Hiatal hernia, History of blood transfusion, Hyperlipidemia, Hyperplastic colon polyp, Hypertension, Incontinence, Liver mass, Lower limb ulcer, calf, right, limited to breakdown of skin (HCC), Lower limb ulcer, calf, right, with fat layer exposed (HCC), Morbid obesity, Movement disorder, Myocardial infarction (HCC), O2 dependent, Osteoarthritis, generalized, Pain in both lower legs, Peripheral vascular disease, Pneumonia, Pulmonary edema, PVD (peripheral vascular disease) with claudication, Sleep apnea, Status post peripheral artery angioplasty, Tinea pedis of both feet, Tobacco abuse, Tobacco abuse, Tobacco dependence, Tubular adenoma polyp of rectum, Urinary tract infection due to ESBL Klebsiella, Ventral hernia, and Ventricular asystolia (HCC). who presents to the ED for abdominal pain.  Patient states over the last 3 days, she has noticed some blood in her stool.  She has been having some red as well as some black bowel movements.  She has been having some significant pain in the right lower quadrant of the abdomen that she says is \"hard\".  She also says she was 
  Gastroenterology, Hepatology, &  Advanced Endoscopy    Progress Note    Dr. Rich will be OOT from 3/31 to 4/7      Reason for Consult: blood in stool, hx of IBS     HPI:   Steph Gonzáles is a 69 y.o. female w/ PMH of  has a past medical history of Acute kidney injury superimposed on CKD, Asthma, Atherosclerosis of native artery of left leg with rest pain (HCC), 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), Atrioventricular block, CAD (coronary artery disease), Cellulitis and abscess of trunk, Cerebellar infarct (HCC), Chronic back pain, Chronic kidney disease, Chronic systolic congestive heart failure (HCC), Chronic venous insufficiency, COPD (chronic obstructive pulmonary disease) (HCC), COVID-19, Depression, Diabetes mellitus (HCC), Diabetic neuropathy (HCC), Diverticulosis, Fatty liver, Glaucoma, open angle, Hepatic encephalopathy (HCC), Hiatal hernia, History of blood transfusion, Hyperlipidemia, Hyperplastic colon polyp, Hypertension, Incontinence, Liver mass, Lower limb ulcer, calf, right, limited to breakdown of skin (HCC), Lower limb ulcer, calf, right, with fat layer exposed (HCC), Morbid obesity, Movement disorder, Myocardial infarction (HCC), O2 dependent, Osteoarthritis, generalized, Pain in both lower legs, Peripheral vascular disease, Pneumonia, Pulmonary edema, PVD (peripheral vascular disease) with claudication, Sleep apnea, Status post peripheral artery angioplasty, Tinea pedis of both feet, Tobacco abuse, Tobacco abuse, Tobacco dependence, Tubular adenoma polyp of rectum, Urinary tract infection due to ESBL Klebsiella, Ventral hernia, and Ventricular asystolia (HCC). who presents to the ED for abdominal pain.  Patient states over the last 3 days, she has noticed some blood in her stool.  She has been having some red as well as some black bowel movements.  She has been having some significant pain in the right lower quadrant of the abdomen that she 
  OCCUPATIONAL THERAPY INITIAL EVALUATION    [facility]  [address], [place]        Date:3/25/2025                                                  Patient Name: Steph Gonzáles    MRN: [id number]    : 1955    Room: [address] [place] [address]      Evaluating OT: Deepa Cordoba OTR/L; [id number]       Referring Provider: Miguel Angel Marinelli MD     Specific Provider Orders/Date: OT Eval and Treat [id number]       Diagnosis: Acute on chronic respiratory failure with hypoxia; Coronary artery disease involving native coronary artery of native heart without angina pectoris (Chronic); DAYAN and COPD overlap syndrome (Chronic);  Functional diarrhea; Pneumonia of both lungs due to infectious organism; O2 dependent; CKD (chronic kidney disease) stage 3, GFR 30-59 ml/min (Chronic); Uncontrolled type 2 diabetes mellitus with hyperglycemia, with long-term current use of insulin; Chronic obstructive pulmonary disease; Class 2 severe obesity with serious comorbidity and body mass index (BMI) of 35.0 to 35.9 in adult.    Surgery: None at time of eval.     Pertinent Medical History:  has a past medical history of Acute kidney injury superimposed on CKD, Asthma, Atherosclerosis of native artery of left leg with rest pain (HCC), 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), Atrioventricular block, CAD (coronary artery disease), Cellulitis and abscess of trunk, Cerebellar infarct (HCC), Chronic back pain, Chronic kidney disease, Chronic systolic congestive heart failure (HCC), Chronic venous insufficiency, COPD (chronic obstructive pulmonary disease) (HCC), COVID-19, Depression, Diabetes mellitus (HCC), Diabetic neuropathy (HCC), Diverticulosis, Fatty liver, Glaucoma, open angle, Hepatic encephalopathy (HCC), Hiatal hernia, History of blood transfusion, Hyperlipidemia, Hyperplastic colon polyp, Hypertension, Incontinence, Liver mass, 
  Peak Flow Results    Predicted  Peak Flow 657 lpm    Peak Flow before bronchodilators 130 lpm, which is 20 % predicted    Peak Flow after bronchodilators 135 lpm, which is 20 % predicted.         Performed by Ashu Toribio RCP    
  Peak Flow Results    Predicted  Peak Flow 657 lpm    Peak Flow before bronchodilators 150 lpm, which is 23 % predicted    Peak Flow after bronchodilators 175 lpm, which is 27 % predicted.         Performed by Heather Carvalho RCP  
  Physician Progress Note      PATIENT:               JOEL FLORES  CSN #:                  [id number]  :                       1955  ADMIT DATE:       3/23/2025 5:22 PM  DISCH DATE:  RESPONDING  PROVIDER #:        Baron Ballard MD          QUERY TEXT:    Patient admitted with COPD exacerbation and acute on chronic respiratory   failure with hypoxia noted in H&P 3/23 and subsequent Progress Notes. Noted   documentation of \"Patient wears 3 L of oxygen at home\" in ED Note 3/23, \"no   respiratory distress\" in Progress Notes, Respiratory rates 16-27, and Oxygen   Saturations 95-98% on 3-5L oxygen. In order to support the diagnosis of acute   on chronic respiratory failure, please include additional clinical indicators   in your documentation.  Or please document if the diagnosis of acute on   chronic respiratory failure has been ruled out afte    The medical record reflects the following:  Risk Factors: COPD, Asthma, PNA, CHF  Clinical Indicators: -ED Provider Note 3/23 \"Patient wears 3 L of oxygen at   home...Effort: Pulmonary effort is normal. Breath sounds: No stridor.   Examination of the right-lower field reveals rhonchi and rales. Rhonchi and   rales present. No wheezing.\"  -H&P 3/23 \"COPD on home oxygen...Respiratory: On nasal cannula oxygen,   bilateral wheezing with crackles...Acute on chronic respiratory failure with   hypoxia...admitted for acute on chronic respiratory hypoxia, likely secondary   to COPD exacerbation\"  -Progress Note 3/24 \"no respiratory distress...Acute on chronic hypoxic   respiratory failure 2/2 COPD exacerbation 2/2 CAP versus volume overload in   the setting of CHF\"  -Pulmonology Consult Note 3/25 \"Acute respiratory failure with   hypoxia...Severe persistent asthma with exacerbation...Nocturnal home O2   dependence 3-3.5 L at bedtime\"  -3/23-3/24/25 Respiratory rates 16-27  -3/23/2025 1722 Oxygen saturation 96% on Room Air, 3/23/2025 1804-3/24/2025   2035 Oxygen Saturations 
  Radiology Procedure Waiver   Name: Steph Gonzáles  : 1955  MRN: [id number]    Date:  3/23/25    Time: 7:12 PM EDT    Benefits of immediately proceeding with Radiology exam(s) without pre-testing outweigh the risks or are not indicated as specified below and therefore the following is/are being waived:    [] Pregnancy test   [] Patients LMP on-time and regular.   [] Patient had Tubal Ligation or has other Contraception Device.   [] Patient  is Menopausal or Premenarcheal.    [] Patient had Full or Partial Hysterectomy.    [] Protocol for Iodine allergy    [] MRI Questionnaire     [] BUN/Creatinine   [] Patient age w/no hx of renal dysfunction.   [] Patient on Dialysis.   [] Recent Normal Labs.  Electronically signed by Wali Tucker DO on 3/23/25 at 7:12 PM EDT           
 Steph Gonzáles is a 69 y.o.  female     Neurology following for seizure-like activity    PMH of asthma, coronary artery disease, chronic kidney disease congestive heart failure, COPD, depression, diabetes, hypertension, hyperlipidemia, peripheral vascular disease, sleep apnea,      Assessment:     Seizure-like activity consisting of body wide shaking  Patient with an episode of shaking with completely reports of patient being able to respond during shaking episode and patient not being able to respond during shaking episode  No history of seizures  MRI negative for seizures or abnormalities  EEG pending    Plan:     EEG  No need for seizure medications at this time  Continue seizure precautions for 6 months from date of last seizure. These include, but are not limited to:   No driving  No riding bicycles in traffic  No operating dangerous/heavy machinery  No engaging in activities at dangerous heights including using ladders  No taking baths unattended  No swimming  No engaging in activities near fire unattended  Recommend caution or avoidance of cooking or using potentially dangerous objects such as knives.  Avoid any activities where sudden loss of consciousness could result in injury to yourself or others      History of Present Illness:     Patient initially presented to the ER on 3/23/2025 after experiencing abdominal pain and Hemoccult stool.  She was having red and black stools and significant pain in her abdomen.  She was recently diagnosed with pneumonia at [facility] and is discharged home.  She said she was breathing harder than normal and typically wears 3 L of O2 at home.     She was also complaining of right ear pain which has been hurting her for approximately 1 month.  ENT saw patient and diagnosed her with mastoid infusion, right otalgia, and right TMJ arthralgia.  They prescribed her Flonase and recommended NSAIDs     Gastroenterology saw patient and is planning on colonoscopy this Friday.   
4 Eyes Skin Assessment     NAME:  Steph Gonzáles  YOB: 1955  MEDICAL RECORD NUMBER:  [id number]    The patient is being assessed for  Admission    I agree that at least one RN has performed a thorough Head to Toe Skin Assessment on the patient. ALL assessment sites listed below have been assessed.      Areas assessed by both nurses:    Head, Face, Ears, Shoulders, Back, Chest, Arms, Elbows, Hands, Sacrum. Buttock, Coccyx, Ischium, and Legs. Feet and Heels        Does the Patient have a Wound? No noted wound(s)       Jadon Prevention initiated by RN: No  Wound Care Orders initiated by RN: No    Pressure Injury (Stage 3,4, Unstageable, DTI, NWPT, and Complex wounds) if present, place Wound referral order by RN under : No    New Ostomies, if present place, Ostomy referral order under : No     Nurse 1 eSignature: Electronically signed by Madie Waite RN on 3/24/25 at 5:16 PM EDT    **SHARE this note so that the co-signing nurse can place an eSignature**    Nurse 2 eSignature: Electronically signed by Neelam Rao RN on 3/24/25 at 5:32 PM EDT  
4 Eyes Skin Assessment     NAME:  Steph Gonzáles  YOB: 1955  MEDICAL RECORD NUMBER:  [id number]    The patient is being assessed for  Transfer to New Unit    I agree that at least one RN has performed a thorough Head to Toe Skin Assessment on the patient. ALL assessment sites listed below have been assessed.      Areas assessed by both nurses:    Head, Face, Ears, Shoulders, Back, Chest, Arms, Elbows, Hands, Sacrum. Buttock, Coccyx, Ischium, Legs. Feet and Heels, and Under Medical Devices         Does the Patient have a Wound? No noted wound(s)       -ecchymosis BUE  -surgical scar left knee  -old scars on BLE    Jadon Prevention initiated by RN: Yes  Wound Care Orders initiated by RN: No    Pressure Injury (Stage 3,4, Unstageable, DTI, NWPT, and Complex wounds) if present, place Wound referral order by RN under : No    New Ostomies, if present place, Ostomy referral order under : No     Nurse 1 eSignature: Electronically signed by Selin Grimm RN on 3/26/25 at 2:40 PM EDT    **SHARE this note so that the co-signing nurse can place an eSignature**    Nurse 2 eSignature: Electronically signed by Antoine Moody RN on 3/26/25 at 4:09 PM EDT  
Arrived at pt room to insert midline catheter -  pt nurse and charge nurse asked that we hold off on inserting midline and present a pt may be discharged today. Blood draw done on pt using the US per nurse and pt request.   
Bipap taken off and placed on 4lnc prior to nursing giving med  
Cardiology consult sent via [facility].  
Consult sent to Dr De Jesus via [employer] [facility]  
Date: 3/29/2025    Time: 12:51 AM    Patient Placed On BIPAP/CPAP/ Non-Invasive Ventilation?  Patient continues on bipap    If no must comment.  Facial area red/color change? No           If YES are Blister/Lesion present?No   If yes must notify nursing staff  BIPAP/CPAP skin barrier?  Yes    Skin barrier type:mepilexlite       Comments:        Yulisa Muse RCP  
Dr. Bonds notified of vascular surgery consult. Pt added to census.  
Dr. Zamarripa notified of otolaryngology consult. Pt added to census.  
EEG completed.  Report to follow.  Erum Rodas    
ENT consulted - Olman Zamarripa DO    Patient has already been seen and assessed by my attending. Can follow up outpatient per our note   
Gave soap suds enema. No bowel mvt.  
Had no BM last night  
[facility] consult called to Dr. Saini     
MRI screening needs to be completed.    Please make sure your patient can lay flat onto their back. (not kyphotic/SOB/contracted, etc) -if not patient cannot come down to MRI.    If it is stated on screening that they need medicated for claustrophobia/pain/holding still -have the doctor put med orders in.    If patient is a prisoner- check with patients security they have the correct MRI accommodations done as in correct flex/plastic cuffs. Also let MRI know on screening patient is a prisoner.    If patient is on a IV drip that they cannot come off of for MRI- please inform MRI so we can coordinate with the RN that will come down to switch to MRI safe pump.    Thank you from your MRI team.    
Message sent to IV team regarding patient refusing AM lab work for phlebotomist. Patient only allowing IV team to draw lab work.   
Message sent to attending regarding \"Called into pt room by daughter, daughter saying pt is having seizure. Pt is shaking but talking to staff and answering questions during these episodes. Pt never had LOC and vitals were stable\"  
No BM noted as of now. Pt daughter brought at home med Linz[place], pharmacy verified and in pt med bin. Colace, soap suds enema, senna and glycolax all administered today  
[facility] notified that Primary would like for them to manage the patient for constipation and recs for a bowel regimen due to Dr Rich being out of town.  
Notified IV team of midline order.   
OUT OF ROOM FOR TESTING    Labs and rhythm strips reviewed.     Cardiology will sign off-Please call if needed    F/u with Dr Boateng after discharge.    Josiah Joseph MD  3/26/2025  1:47 PM  
Occupational Therapy  OT BEDSIDE TREATMENT NOTE   [facility]  [address], [place]        Date:3/28/2025  Patient Name: Steph Gonzáles  MRN: [id number]  : 1955  Room: [address] [place] [address]A     Per OT Eval:     Evaluating OT: Deepa Cordoba OTR/L; [id number]        Referring Provider: Miguel Angel Marinelli MD     Specific Provider Orders/Date: OT Eval and Treat 25 0684        Diagnosis: Acute on chronic respiratory failure with hypoxia; Coronary artery disease involving native coronary artery of native heart without angina pectoris (Chronic); DAYAN and COPD overlap syndrome (Chronic);  Functional diarrhea; Pneumonia of both lungs due to infectious organism; O2 dependent; CKD (chronic kidney disease) stage 3, GFR 30-59 ml/min (Chronic); Uncontrolled type 2 diabetes mellitus with hyperglycemia, with long-term current use of insulin; Chronic obstructive pulmonary disease; Class 2 severe obesity with serious comorbidity and body mass index (BMI) of 35.0 to 35.9 in adult.    Surgery: None at time of eval.     Pertinent Medical History:  has a past medical history of Acute kidney injury superimposed on CKD, Asthma, Atherosclerosis of native artery of left leg with rest pain (HCC), 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), Atrioventricular block, CAD (coronary artery disease), Cellulitis and abscess of trunk, Cerebellar infarct (HCC), Chronic back pain, Chronic kidney disease, Chronic systolic congestive heart failure (HCC), Chronic venous insufficiency, COPD (chronic obstructive pulmonary disease) (HCC), COVID-19, Depression, Diabetes mellitus (HCC), Diabetic neuropathy (HCC), Diverticulosis, Fatty liver, Glaucoma, open angle, Hepatic encephalopathy (HCC), Hiatal hernia, History of blood transfusion, Hyperlipidemia, Hyperplastic colon polyp, Hypertension, Incontinence, Liver mass, Lower limb ulcer, calf, right, 
Patient refusing walking pulse Ox at this time for not feeling well and due to leg pain.   
Per Dr. Ballard: Notified GI that pt has still not had BM  
Physical Therapy  Initial Assessment     Name: Steph Gonzáles  : 1955  MRN: [id number]      Date of Service: 3/25/2025    Evaluating PT: Pedrito Solis, PT, DPT [id number]      Room #:  6502/6502-A  Diagnosis:  Acute on chronic respiratory failure with hypoxia [J96.21]  Pneumonia of both lungs due to infectious organism, unspecified part of lung [J18.9]  PMHx/PSHx:   has a past medical history of Acute kidney injury superimposed on CKD, Asthma, Atherosclerosis of native artery of left leg with rest pain (HCC), 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), Atrioventricular block, CAD (coronary artery disease), Cellulitis and abscess of trunk, Cerebellar infarct (HCC), Chronic back pain, Chronic kidney disease, Chronic systolic congestive heart failure (HCC), Chronic venous insufficiency, COPD (chronic obstructive pulmonary disease) (HCC), COVID-19, Depression, Diabetes mellitus (HCC), Diabetic neuropathy (HCC), Diverticulosis, Fatty liver, Glaucoma, open angle, Hepatic encephalopathy (HCC), Hiatal hernia, History of blood transfusion, Hyperlipidemia, Hyperplastic colon polyp, Hypertension, Incontinence, Liver mass, Lower limb ulcer, calf, right, limited to breakdown of skin (HCC), Lower limb ulcer, calf, right, with fat layer exposed (HCC), Morbid obesity, Movement disorder, Myocardial infarction (HCC), O2 dependent, Osteoarthritis, generalized, Pain in both lower legs, Peripheral vascular disease, Pneumonia, Pulmonary edema, PVD (peripheral vascular disease) with claudication, Sleep apnea, Status post peripheral artery angioplasty, Tinea pedis of both feet, Tobacco abuse, Tobacco abuse, Tobacco dependence, Tubular adenoma polyp of rectum, Urinary tract infection due to ESBL Klebsiella, Ventral hernia, and Ventricular asystolia (HCC).  Procedure/Surgery:  NA  Precautions:  Fall risk, O2, continuous pulse ox, [place]  Equipment Needs:  None    SUBJECTIVE:    Pt lives 
Pt complaining of a 10 of 10 RLQ pain even with lidocaine patch on.    Madonna Ballard RN  7:31 AM    
Pt had small, liquid BM  
Pt was ordered [place], but patient states that she does not actually know for sure if she does have a true allergy to Norco.  Pt was ordered in the past.    Madonna Ballard RN      
Pulm. Consult sent via perfect serve.  
RN messaged attending via [employer] regarding patient needing medicated prior to brain MRI due to claustrophobia. RN to enter orders.   
RN notified attending of patient's potassium level of 5.8 this AM.   
SROC came to see patient and family who is visiting. Family stated they wanted to leave today but they were told on day shift that the patient was not cleared by primary to leave. SROC and NP stated that she is okay to leave and follow up with PCP but now the  family is stating that it is too late to take her home and get her situated. NP ordered new medication for constipation to give tonight and the SROC stated to this RN that she would call and speak to the NP about the situation and the discharge issue  
Sent message to attending regarding patient having elevated potassium level.    Madonna Ballard RN    
Spiritual Health History and Assessment/Progress Note  [facility] [place]    (P) Spiritual/Emotional Needs, (P) Follow up,  ,      Name: Steph Gonzáles MRN: [id number]    Age: 69 y.o.     Sex: female   Language: English   Confucianist: Adventism   Acute on chronic respiratory failure with hypoxia     Date: 3/25/2025                           Spiritual Assessment began in [facility]        Referral/Consult From: (P) Rounding   Encounter Overview/Reason: (P) Spiritual/Emotional Needs  Service Provided For: (P) Patient    Monique, Belief, Meaning:   Patient has beliefs or practices that help with coping during difficult times  Family/Friends are connected with a monique tradition or spiritual practice      Importance and Influence:  Patient has no beliefs influential to healthcare decision-making identified during this visit  Family/Friends have spiritual/personal beliefs that influence decisions regarding the patient's health    Community:  Patient feels well-supported. Support system includes: Children and Extended family  Family/Friends are connected with a spiritual community:    Assessment and Plan of Care:     Patient Interventions include: Facilitated expression of thoughts and feelings  Family/Friends Interventions include: Facilitated expression of thoughts and feelings    Patient Plan of Care: Spiritual Care available upon further referral  Family/Friends Plan of Care: Spiritual Care available upon further referral    Electronically signed by Chaplain Juliana on 3/25/2025 at 3:09 PM   
Stephjoie Gonzáles was ordered Linaclotide 290 mcg which is a nonformulary medication.  This medication will need to be supplied by the patient as the pharmacy does not carry this non-formulary medication.    If the medication has not been administered by 1400 on the following day from the time the order was placed, a pharmacist will follow-up with the nurse of the patient to assess the capability of the patient to bring in the medication.    If it is determined that the patient cannot supply the medication and it is not available to be dispensed from the pharmacy, the provider will be notified.    Aury Yuan, Abdirahman, [facility], BCPS 3/23/2025 11:42 PM      
production, back pain  Requesting for bariatric air mattress, reports constipation    ROS: denies fever, chills, cp, , n/v, HA unless stated above.      fluticasone  1 spray Each Nostril BID    sennosides-docusate sodium  2 tablet Oral Daily    polyethylene glycol  17 g Oral Daily    doxycycline hyclate  100 mg Oral 2 times per day    sodium chloride flush  5-40 mL IntraVENous 2 times per day    lidocaine 1 % injection  50 mg IntraDERmal Once    bumetanide  1 mg Oral Daily    miconazole   Topical BID    nicotine  1 patch TransDERmal Daily    lidocaine  1 patch TransDERmal Daily    aspirin  81 mg Oral Daily    arformoterol 15 mcg-budesonide 0.5 mg neb solution   Nebulization BID RT    cetirizine  10 mg Oral Daily    dicyclomine  10 mg Oral BID    DULoxetine  60 mg Oral Daily    hydrALAZINE  100 mg Oral BID    isosorbide mononitrate  60 mg Oral Daily    linaclotide  290 mcg Oral QAM AC    minoxidil  2.5 mg Oral BID    therapeutic multivitamin-minerals  1 tablet Oral Daily    pantoprazole  40 mg Oral QAM AC    oxyBUTYnin  15 mg Oral Nightly    sacubitril-valsartan  1 tablet Oral BID    atorvastatin  10 mg Oral Daily    tiZANidine  4 mg Oral Nightly    traZODone  150 mg Oral Nightly    vitamin D  2,000 Units Oral Daily    sodium chloride flush  5-40 mL IntraVENous 2 times per day    [Held by provider] enoxaparin  40 mg SubCUTAneous Daily    ipratropium 0.5 mg-albuterol 2.5 mg  1 Dose Inhalation Q4H WA RT    insulin glargine  10 Units SubCUTAneous Daily    insulin lispro  0-4 Units SubCUTAneous 4x Daily AC & HS     diphenhydrAMINE, 25 mg, Q6H PRN  bisacodyl, 10 mg, PRN  sodium chloride flush, 5-40 mL, PRN  sodium chloride, , PRN  traMADol, 50 mg, Q6H PRN  sodium chloride flush, 5-40 mL, PRN  sodium chloride, , PRN  albuterol, 2.5 mg, Q2H PRN  guaiFENesin-dextromethorphan, 5 mL, Q4H PRN  glucose, 4 tablet, PRN  dextrose bolus, 125 mL, PRN   Or  dextrose bolus, 250 mL, PRN  glucagon (rDNA), 1 mg, PRN  dextrose, , 
sister in a 1 story home with 2 stair(s) and 1 rail(s) to enter. There is also ramped entry into her home. Pt ambulated with a rollator prior to admission. Pt owns a cane as well. Pt reports she wears Trilogy for O2 needs at night.     OBJECTIVE:   Initial Evaluation  Date: 3/25/25 Treatment Date:  3/28/25 Short Term/ Long Term   Goals   AM-PAC 6 Clicks 16/24 16/24    Was pt agreeable to Eval/treatment? Yes yes    Does pt have pain? Reports severe pain on R side of body. RN notified.  9/10 leg pain     Bed Mobility  Rolling: NT  Supine to sit: NT  Sit to supine: NT  Scooting: NT Rolling: SBA  Supine to sit: SBA  Sit to supine: NT  Scooting: SBA Rolling: Independent  Supine to sit: Independent  Sit to supine: Independent  Scooting: Independent   Transfers Sit to stand: SBA  Stand to sit: SBA  Stand pivot: SBA with FWW Sit to stand: Espinoza  Stand to sit: Espinoza  Stand pivot: Espinoza with FWW Sit to stand: Independent  Stand to sit: Independent  Stand pivot: Independent with AAD   Ambulation   40' with FWW with SBA 25' x2 with Espinoza and WW  >200 feet with AAD independently    Stair negotiation: ascended and descended NT NT >2 step(s) with 1 rail(s) independently    ROM BUE: Refer to OT note  BLE: WFL     Strength BUE: Refer to OT note  BLE: WFL     Balance Sitting EOB: SBA  Dynamic Standing: SBA with FWW Sitting EOB: SBA  Dynamic Standing: Espinoza with FWW Sitting EOB: Independent  Dynamic Standing: Independent with AAD     Pt is A & O x 4.  Sensation: Reports R foot numbness and that she tends to drop things with her L hand.   Edema: Unremarkable    Patient education  Pt educated on PT role in the acute care setting and safety with all functional mobility.     Patient response to education:   Pt verbalized understanding Pt demonstrated skill Pt requires further education in this area   yes yes no     ASSESSMENT:    Comments:    Pt was supine on the bed pan upon room entry; agreeable to PT treatment. Pt complained of more pain 
bruits  Abdomen: soft, non-tender, non-distended, normal bowel sounds, no masses or organomegaly  Extremities: no cyanosis, no clubbing and no edema  Neurologic: no cranial nerve deficit and speech normal        Recent Labs     03/23/25  1745 03/24/25  0417    137   K 4.6 5.2*    102   CO2 24 21*   BUN 24* 25*   CREATININE 1.7* 1.6*   GLUCOSE 91 248*   CALCIUM 8.8 8.7       Recent Labs     03/23/25  1745 03/24/25  0417   WBC 6.3 6.3   RBC 3.22* 3.37*   HGB 10.1* 10.7*   HCT 33.4* 34.3   .7* 101.8*   MCH 31.4 31.8   MCHC 30.2* 31.2*   RDW 12.7 12.6    275   MPV 9.8 10.1         Assessment:    Acute on chronic hypoxic respiratory failure 2/2 COPD exacerbation 2/2 CAP versus volume overload in the setting of CHF  CKD baseline creatinine 1.3-1.4  T2DM on insulin  Right ear pain possibly 2/impacted cerumen  HFpEF  IBS  Chronic back pain  Multiple allergies to medications  Morbid obesity  CAD  Current tobacco abuse      Plan:  On 5 L nasal cannula, wean as able  Continue Rocephin IV and add doxycycline IV for CAP coverage  Continue Solu-Medrol prednisone taper  Continue breathing treatments  Continue Bumex 1 mg IV daily  GI consulted as patient was reporting bloody stool since last Tuesday with history of IBS.  Hold DVT prophylaxis for now.  ENT consulted for right ear pain  Patient complaining of back pain.  Per allergy list patient cannot have Dilaudid, morphine, NSAIDs, oxycodone/acetaminophen.  Would stick with topical conservative treatments for now as she is allergic to most options for medical management while admitted.  Please continue the following insulin regimen:  Lantus 10 units daily  Low-dose sliding scale  Continue other medications including aspirin, Lipitor, Zyrtec, bowel regimen, Cymbalta, hydralazine, Imdur, minoxidil, oxybutynin, Entresto, PPI, Zanaflex, trazodone, vitamins  Start nicotine patch daily. Patient states she will never stop smoking      DC planning: Pending 
per day    [Held by provider] enoxaparin  40 mg SubCUTAneous Daily    ipratropium 0.5 mg-albuterol 2.5 mg  1 Dose Inhalation Q4H WA RT    predniSONE  40 mg Oral Daily    insulin glargine  10 Units SubCUTAneous Daily    insulin lispro  0-4 Units SubCUTAneous 4x Daily AC & HS       Physical Exam:  General Appearance: appears comfortable in no acute distress.   HEENT: Normocephalic atraumatic without obvious abnormality   Neck: Lips, mucosa, and tongue normal.  Supple, symmetrical, trachea midline, no adenopathy;thyroid:  no enlargement/tenderness/nodules or JVD.  Lung: Breath sounds CTA. Respirations   unlabored. Symmetrical expansion.  Heart: RRR, normal S1, S2. No MRG  Abdomen: Soft, NT, ND. BS present x 4 quadrants. No bruit or organomegaly.   Extremities: Pedal pulses 2+ symmetric b/l.  Extremities normal, no cyanosis, clubbing, or edema.   Musculokeletal: No joint swelling, no muscle tenderness. ROM normal in all joints of extremities.   Neurologic: Mental status: Alert and Oriented X3 .    Pertinent/ New Labs and Imaging Studies     Imaging personally reviewed:  CT neck  IMPRESSION:  1. No mass or abscess is seen.  2. No cervical lymphadenopathy.  3. Large right mastoid effusion with opacification of the right middle ear  cavity. Clinical correlation for otomastoiditis is recommended.  4. Excessive intraorbital fat with bilateral exophthalmos.  Clinical  correlation for possible thyroid orbitopathy recommended      CT chest  Impression   Cardiomegaly with septal thickening mosaicism throughout the lungs of a  interstitial pulmonary edema pattern without significant decompensation as  there is no pleural effusion or separate consolidation to suggest  bronchopneumonia.  Mild body wall edema along with trace perihepatic and  perisplenic ascites in the upper abdomen overall edematous appearance.         Chest x-ray pulmonary edema    CT abdomen and pelvis lung windows  Pulmonary edema    CT chest noncontrast 
06:45 AM     CMP:    Lab Results   Component Value Date/Time     03/26/2025 06:45 AM    K 5.1 03/26/2025 06:45 AM    K 4.6 03/14/2023 12:24 PM     03/26/2025 06:45 AM    CO2 22 03/26/2025 06:45 AM    BUN 34 03/26/2025 06:45 AM    CREATININE 1.7 03/26/2025 06:45 AM    GFRAA 54 10/17/2022 04:43 PM    LABGLOM 31 03/26/2025 06:45 AM    LABGLOM 34 04/24/2024 12:30 PM    GLUCOSE 153 03/26/2025 06:45 AM    GLUCOSE 181 12/01/2011 02:11 PM    CALCIUM 8.2 03/26/2025 06:45 AM    BILITOT 0.2 03/23/2025 05:45 PM    ALKPHOS 82 03/23/2025 05:45 PM    AST 10 03/23/2025 05:45 PM    ALT 17 03/23/2025 05:45 PM     HgBA1c:    Lab Results   Component Value Date/Time    LABA1C 5.9 03/24/2025 04:17 AM     Lab Results   Component Value Date    CHOL 126 11/12/2024    TRIG 86 11/12/2024    HDL 46 11/12/2024    LDL 63 11/12/2024    VLDL 17 11/12/2024     CT head  CT HEAD WO CONTRAST  Final Result  1. No acute intracranial abnormality.  2. Fluid opacification of the right mastoid air cells. Correlate for  mastoiditis.     MRI brain  1. No acute intracranial abnormality.  2. Chronic infarctions in the inferior right cerebellar hemisphere.  3. Mild volume loss and chronic microvascular ischemic changes.        All labs and imaging studies reviewed independently today             PATRIZIA Urbina - CNP  10:54 AM  3/28/2025  
recommendations  GI following as patient was reporting bloody stool since last Tuesday with history of IBS.  Hold DVT prophylaxis for now.  Tentative plan for colonoscopy once respiratory status improves   Awaiting pulmonary clearance for colonoscopy  ENT consulted for right ear pain, recommended Flonase twice daily to aid in resolution of middle ear/mastoid effusion.  Warm compression to right TMJ, as needed NSAIDs, outpatient follow-up  Patient complaining of back pain.  Per allergy list patient cannot have Dilaudid, morphine, NSAIDs, oxycodone/acetaminophen.  Would stick with topical conservative treatments for now as she is allergic to most options for medical management while admitted.  Please continue the following insulin regimen:  Lantus 10 units daily  Low-dose sliding scale  Continue other medications including aspirin, Lipitor, Zyrtec, bowel regimen, Cymbalta, hydralazine, Imdur, minoxidil, oxybutynin, Entresto, PPI, Zanaflex, trazodone, vitamins  NRT while inpatient.  Strongly advised on quitting smoking  Multiple Soapsuds enema tired  Ambulate while inpatient as able  Neurology following for seizure-like episode, follow recommendations  General surgery consulted as GI attending out of town for constipation, no surgical intervention, continue bowel regimen       DC planning: Pending above, await bowel movement      NOTE: This report was transcribed using voice recognition software. Every effort was made to ensure accuracy; however, inadvertent computerized transcription errors may be present.    Electronically signed by Baron Ballard MD on 3/30/2025 at 9:16 AM      
5-40 mL  5-40 mL IntraVENous 2 times per day Miguel Angel Marinelli MD   10 mL at 03/25/25 0954    sodium chloride flush 0.9 % injection 5-40 mL  5-40 mL IntraVENous PRN Miguel Angel Marinelli MD        0.9 % sodium chloride infusion   IntraVENous PRN Miguel Angel Marinelli MD        polyethylene glycol (GLYCOLAX) packet 17 g  17 g Oral Daily PRN Miguel Angel Marinelli MD        [Held by provider] enoxaparin (LOVENOX) injection 40 mg  40 mg SubCUTAneous Daily Miguel Angel Marinelli MD   40 mg at 03/24/25 0925    ipratropium 0.5 mg-albuterol 2.5 mg (DUONEB) nebulizer solution 1 Dose  1 Dose Inhalation Q4H WA RT Miguel Angel Marinelli MD   1 Dose at 03/25/25 0830    albuterol (PROVENTIL) (2.5 MG/3ML) 0.083% nebulizer solution 2.5 mg  2.5 mg Nebulization Q2H PRN Miguel Angel Marinelli MD        guaiFENesin-dextromethorphan (ROBITUSSIN DM) 100-10 MG/5ML syrup 5 mL  5 mL Oral Q4H PRN Miguel Angel Marinelli MD   5 mL at 03/24/25 0004    methylPREDNISolone sodium succ (SOLU-MEDROL) 40 mg in sterile water 1 mL injection  40 mg IntraVENous Q8H Miguel Angel Marinelli MD   40 mg at 03/25/25 0550    Followed by    [START ON 3/26/2025] predniSONE (DELTASONE) tablet 40 mg  40 mg Oral Daily Miguel Angel Marinelli MD        bumetanide (BUMEX) injection 1 mg  1 mg IntraVENous Daily Miguel Angel Marinelli MD   1 mg at 03/25/25 0949    glucose chewable tablet 16 g  4 tablet Oral PRN Miguel Angel Marinelli MD        dextrose bolus 10% 125 mL  125 mL IntraVENous PRN Miguel Angel Marinelli MD        Or    dextrose bolus 10% 250 mL  250 mL IntraVENous PRN Miguel Angel Marinelli MD        glucagon injection 1 mg  1 mg SubCUTAneous PRN Miguel Angel Marinelli MD        dextrose 10 % infusion   IntraVENous Continuous PRN Miguel Angel Marinelli MD        insulin glargine (LANTUS) injection vial 10 Units  10 Units SubCUTAneous Daily Miguel Angel Marinelli MD   10 Units at 03/25/25 0948    insulin lispro (HUMALOG,ADMELOG) injection vial 0-4 Units  0-4 Units SubCUTAneous 4x Daily AC & HS Miguel Angel Marinelli MD   2 Units at 03/24/25 2143    prochlorperazine (COMPAZINE) tablet 10 mg  10 mg Oral Q8H PRN Miguel Angel Marinelli MD        Or    
(CA 19-9)  This test uses Roche CA 19-9 electrochemiluminescent immunoassay.  Results  obtained with different test methods or kits cannot be used  interchangeably.  CA 19-9 value is useful in monitoring pancreatic, hepatobiliary,  gastric,  hepatocellular, and colorectal cancer. CA 19-9 value, regardless of  level,  should not be interpreted as absolute evidence of the presence or  absence of  malignant disease.  Performed by [facility],  [address] [address] [phone]  [email or web address], Brandon Pickens MD - Lab. Director     12/01/2016 152 (H) 0 - 37 U/mL Final     Comment:     INTERPRETIVE INFORMATION: Cancer Antigen-GI (CA 19-9)  This test uses Roche CA 19-9 electrochemiluminescent immunoassay.  Results  obtained with different test methods or kits cannot be used  interchangeably.  CA 19-9 value is useful in monitoring pancreatic, hepatobiliary,  gastric,  hepatocellular, and colorectal cancer. CA 19-9 value, regardless of  level,  should not be interpreted as absolute evidence of the presence or  absence of  malignant disease.  Performed by [facility],  [address] [facility] [place] [address] [phone]  [email or web address], Al Keenan MD - Lab. Director     04/07/2016 110 (H) 0 - 37 U/mL Final     Comment:     INTERPRETIVE INFORMATION: Cancer Antigen-GI (CA 19-9)  This test uses Roche CA 19-9 electrochemiluminescent immunoassay.  Results  obtained with different test methods or kits cannot be used  interchangeably.  CA 19-9 value is useful in monitoring pancreatic, hepatobiliary,  gastric,  hepatocellular, and colorectal cancer. CA 19-9 value, regardless of  level,  should not be interpreted as absolute evidence of the presence or  absence of  malignant disease.  Performed by [facility],  [address] [address] [phone]  [email or web address], Al Keenan MD - Lab. Director          Previous Endoscopies: Date of last Colonoscopy: 10/31/2018  No flexible sigmoidoscopy on

## 2025-03-30 NOTE — FLOWSHEET NOTE
03/30/25 0148   Stool Assessment   Incontinence No   Stool Appearance Loose   Stool Color Brown   Stool Amount Large   Stool Source Rectum   Last BM (including prior to admit) 03/30/25

## 2025-03-30 NOTE — CONSULTS
London Flores [facility]   Inpatient CHF Nurse Navigator Consult      Cardiologist: Dr. Kilo Choi ZULMA Gonzáles is a 69 y.o. (1955) female with a history of HFpEF, most recent EF:  55% 5/9/2024    Patient was awake and alert, laying in bed during the consultation and is agreeable to heart failure education. She was engaged and asked appropriate questions throughout the education session.     Barriers identified during consult contributing to HF Hospitalization:  [] Limited medication adherence   [] Poor health literacy, education regarding HF medications provided   [] Pill box provided to patient  [] Difficulty affording medications  [] Difficulty obtaining/ managing medications  [] Prescription assistance information given     [] Not weighing themselves daily  [x] Weight log provided for easy monitoring  [] Scale provided     [] Not following low sodium diet  [] Food insecurity   [x] 2 gram sodium diet education provided   [] Low sodium recipes provided  [] Sodium free seasoning provided   [] Low sodium meal delivery options given to patient  [] Dietician consulted     [] Lack of transportation to appointments     [] Depression, given chronic illness  [] Primary team notified     [] Goals of care need addressed  [] Palliative care consulted     [] CHF CHW consulted, to assist with         Chart Reviewed:  Diet: ADULT DIET; Regular; Low Sodium (2 gm)   Daily Weights: Patient Vitals for the past 96 hrs (Last 3 readings):   Weight   03/25/25 0815 80.9 kg (178 lb 6.4 oz)   03/24/25 0009 88.9 kg (196 lb)     I/O:   Intake/Output Summary (Last 24 hours) at 3/25/2025 1805  Last data filed at 3/25/2025 0900  Gross per 24 hour   Intake 400 ml   Output --   Net 400 ml       [] Nursing staff/manager notified of inaccurate alcantara weights or I/O        Discharge Plan:  Above identified barriers reviewed and needs addressed    Patient/family educated on daily monitoring tools for CHF, made aware of 
Patient known to Dr. Villegas.  Will change consult to Dr. Jolly.  Thank you.    PATRIZIA Laura - NP   
supplemental oxygen via nasal cannula.  She denies orthopnea, lower extremity swelling, abdominal bloating, nausea, vomiting. At the time of my exam she was denying any chest pain. Nurse notified cardiology that she started to have chest pain around 11:30.     Please note: past medical records were reviewed per electronic medical record (EMR) - see detailed reports under Past Medical/ Surgical History.     Past Medical History:    CAD  S/p CABG x 2 (LIMA-LAD, SVG-OM1)  [facility] 4/2015 Dr. Cardoso: RCA diffuse moderate disease but no high-grade stenosis.  Left main free of disease.  Status post remote bypass grafting.  Patent SVG-OM circumflex.  Unable to selectively cannulate LIMA graft but there is no significant disease in LAD itself.  LCx moderate caliber but occluded at its proximal segment.  Moderate global LV systolic dysfunction.  Stress test 6/2018: Pharmacologically induced perfusion defect involving lateral wall without evidence of reversibility.  Diffusely hypokinetic LV wall motion with focal area of akinesia involving mid lateral wall.  EF 42%.  Lexiscan 4/2022: Medium size, moderate intensity scar in the inferior, inferior lateral and lateral wall with partial reversibility.  Inferior and inferior lateral wall appears mildly hypokinetic with estimated LVEF 40%.  Intermediate risk scan.  Done during admission with [facility].  Aggressive medical management and recommendation for evaluation outpatient for [facility] once kidney function improved per Dr Tanner.  Dr. Boateng reviewed stress test from April 2022 and said there was no significant reversibility or changes compared to stress of 2018.  ASA, Imdur, Lipitor  HFimEF  TTE 1/2017: EF 35-40%.  LV diffuse hypokinesis.  Mild MR.  TTE 6/5/2018: 65% EF, moderate LVH, stage II DD.  TTE 4/2022: EF 50-55%.  Severe concentric LVH.  LV diastolic filling is elevated.  Mild MR.  Mild TR.  TTE 12/10/2023: EF 60 to 65%.  Severely increased wall thickness.  Normal wall motion.  Diastolic 
And Nefazodone        Past Medical History:   Diagnosis Date    Acute kidney injury superimposed on CKD 04/12/2022    Asthma     Atherosclerosis of native artery of left leg with rest pain (HCC) 11/09/2018    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) 03/06/2024    Atrioventricular block 05/08/2024    CAD (coronary artery disease) 2011    Cellulitis and abscess of trunk 04/14/2015    Cerebellar infarct (HCC) 04/03/2019    Remote, rt. cerebellum, head CT scan, 1/9/19    Chronic back pain     Chronic kidney disease     Chronic systolic congestive heart failure (HCC) 10/04/2013    Chronic venous insufficiency 10/11/2017    COPD (chronic obstructive pulmonary disease) (HCC)     COVID-19     Depression     Diabetes mellitus (HCC)     Diabetic neuropathy (HCC)     Diverticulosis     Fatty liver 01/08/2016    per US    Glaucoma, open angle 02/01/2016    Mild-OU    Hepatic encephalopathy (HCC) 02/07/2016    resolved    Hiatal hernia     History of blood transfusion     Hyperlipidemia     Hyperplastic colon polyp     Hypertension     Incontinence     Liver mass     Lower limb ulcer, calf, right, limited to breakdown of skin (HCC) 03/06/2024    Lower limb ulcer, calf, right, with fat layer exposed (HCC) 04/01/2024    Morbid obesity     Movement disorder     Myocardial infarction (HCC) 2011    O2 dependent 09/16/2021    with bipap 3 liters    Osteoarthritis, generalized     Pain in both lower legs 10/11/2017    Peripheral vascular disease     Pneumonia 01/26/2014    Pulmonary edema     resolved    PVD (peripheral vascular disease) with claudication 08/30/2017    Sleep apnea     uses BI PAP    Status post peripheral artery angioplasty 10/31/2019    Tinea pedis of both feet 04/15/2024    Tobacco abuse     Tobacco abuse 10/11/2017    Tobacco dependence 06/30/2022    Tubular adenoma polyp of rectum     Urinary tract infection due to ESBL Klebsiella 03/08/2017    Ventral hernia     
4 mg  4 mg Oral Nightly Miguel Angel Marinelli MD   4 mg at 03/25/25 2103    traZODone (DESYREL) tablet 150 mg  150 mg Oral Nightly Miguel Angel Marinelli MD   150 mg at 03/25/25 2102    vitamin D (CHOLECALCIFEROL) tablet 2,000 Units  2,000 Units Oral Daily Miguel Angel Marinelli MD   2,000 Units at 03/26/25 0903    sodium chloride flush 0.9 % injection 5-40 mL  5-40 mL IntraVENous 2 times per day Miguel Angel Marinelli MD   10 mL at 03/26/25 0904    sodium chloride flush 0.9 % injection 5-40 mL  5-40 mL IntraVENous PRN Miguel Angel Marinelli MD        0.9 % sodium chloride infusion   IntraVENous PRN Miguel Angel Marinelli MD        polyethylene glycol (GLYCOLAX) packet 17 g  17 g Oral Daily PRN Miguel Angel Marinelli MD        [Held by provider] enoxaparin (LOVENOX) injection 40 mg  40 mg SubCUTAneous Daily Miguel Angel Marinelli MD   40 mg at 03/24/25 0925    ipratropium 0.5 mg-albuterol 2.5 mg (DUONEB) nebulizer solution 1 Dose  1 Dose Inhalation Q4H WA RT Migue lAngel Marinelli MD   1 Dose at 03/26/25 0957    albuterol (PROVENTIL) (2.5 MG/3ML) 0.083% nebulizer solution 2.5 mg  2.5 mg Nebulization Q2H PRN Miguel Angel Marinelli MD        guaiFENesin-dextromethorphan (ROBITUSSIN DM) 100-10 MG/5ML syrup 5 mL  5 mL Oral Q4H PRN Miguel Angel Marinelli MD   5 mL at 03/24/25 0004    predniSONE (DELTASONE) tablet 40 mg  40 mg Oral Daily Miguel Angel Marinelli MD   40 mg at 03/26/25 0902    glucose chewable tablet 16 g  4 tablet Oral PRN Miguel Angel Marinelli MD        dextrose bolus 10% 125 mL  125 mL IntraVENous PRN Miguel Angel Marinelli MD        Or    dextrose bolus 10% 250 mL  250 mL IntraVENous PRN Miguel Angel Marinelli MD        glucagon injection 1 mg  1 mg SubCUTAneous PRN Miguel Angel Marinelli MD        dextrose 10 % infusion   IntraVENous Continuous PRN Miguel Angel Marinelli MD        insulin glargine (LANTUS) injection vial 10 Units  10 Units SubCUTAneous Daily Miguel Angel Marinelli MD   10 Units at 03/26/25 0900    insulin lispro (HUMALOG,ADMELOG) injection vial 0-4 Units  0-4 Units SubCUTAneous 4x Daily AC & HS Miguel Angel Marinelli MD   1 Units at 03/26/25 0901    prochlorperazine (COMPAZINE) tablet 10 
Cancer Mother     Asthma Father     Colon Cancer Brother     Breast Cancer Maternal Aunt 74       REVIEW OF SYSTEMS:      Eyes:      Blurred vision:  No [x]/Yes []               Diplopia:   No [x]/Yes []               Vision loss:       No [x]/Yes []   Ears, nose, throat:             Hearing loss:    No [x]/Yes []      Vertigo:   No [x]/Yes []                       Swallowing problem:  No [x]/Yes []               Nose bleeds:   No [x]/Yes []      Voice hoarseness:  No [x]/Yes []  Respiratory:             Cough:   No [x]/Yes []      Pleuritic chest pain:  No [x]/Yes []                        Dyspnea:   No [x]/Yes []      Wheezing:   No [x]/Yes []  Cardiovascular:             Angina:   No [x]/Yes []      Palpitations:   No [x]/Yes []          Claudication:    No [x]/Yes []      Leg swelling:   No [x]/Yes []  Gastrointestinal:             Nausea or vomiting:  No [x]/Yes []               Abdominal pain:  No [x]/Yes []                     Intestinal bleeding: No [x]/Yes []  Musculoskeletal:             Leg pain:   No [x]/Yes []      Back pain:   No [x]/Yes []                    Weakness:   No [x]/Yes []  Neurologic:             Numbness:   No [x]/Yes []      Paralysis:   No [x]/Yes []                       Headaches:   No [x]/Yes []  Hematologic, lymphatic:   Anemia:   No [x]/Yes []              Bleeding or bruising:  No [x]/Yes []              Fevers or chills: No [x]/Yes []  Endocrine:             Temp intolerance:   No [x]/Yes []                       Polydipsia, polyuria:  No [x]/Yes []  Skin:              Rash:    No [x]/Yes []      Ulcers:   No [x]/Yes []              Abnorm pigment: No [x]/Yes []  :              Frequency/urgency:  No [x]/Yes []      Hematuria:    No [x]/Yes []                      Incontinence:    No [x]/Yes []    PHYSICAL EXAM:  Vitals:    03/28/25 0738   BP: (!) 125/55   Pulse: 76   Resp: 18   Temp: 98.1 °F (36.7 °C)   SpO2: 100%     General Appearance: alert and oriented to person, place and 
FINDINGS: BRAIN/VENTRICLES: There is no acute intracranial hemorrhage, mass effect or midline shift.  No abnormal extra-axial fluid collection.  The gray-white differentiation is maintained without evidence of an acute infarct.  There is no evidence of hydrocephalus. ORBITS: The visualized portion of the orbits demonstrate no acute abnormality. SINUSES: Fluid opacification of the right mastoid air cells. SOFT TISSUES/SKULL:  No acute abnormality of the visualized skull or soft tissues.     1. No acute intracranial abnormality. 2. Fluid opacification of the right mastoid air cells. Correlate for mastoiditis.     CT ABDOMEN PELVIS W IV CONTRAST Additional Contrast? None  Result Date: 3/23/2025  EXAMINATION: CT OF THE ABDOMEN AND PELVIS WITH CONTRAST 3/23/2025 8:54 pm TECHNIQUE: CT of the abdomen and pelvis was performed with the administration of intravenous contrast. Multiplanar reformatted images are provided for review. Automated exposure control, iterative reconstruction, and/or weight based adjustment of the mA/kV was utilized to reduce the radiation dose to as low as reasonably achievable. COMPARISON: June 23, 2024 HISTORY: ORDERING SYSTEM PROVIDED HISTORY: rlq pain, melena TECHNOLOGIST PROVIDED HISTORY: Reason for exam:->rlq pain, melena Additional Contrast?->None Decision Support Exception - unselect if not a suspected or confirmed emergency medical condition->Emergency Medical Condition (MA) What reading provider will be dictating this exam?->CRC FINDINGS: Numerous diverticula are associated with the colon notable level of the sigmoid colon.  No bowel obstruction or free air.  The appendix is grossly unremarkable.  There is a incidental cyst or hemangioma associated with the left lobe of the liver which is stable in size measuring up to 7 mm.  No intrahepatic or extrahepatic bile duct dilatation.  The gallbladder is not visualized.  No evidence of acute pancreatitis.  The spleen is unremarkable. Adrenal 
Data is abnormally low   Latest Reference Range & Units 06/25/24 07:00 06/26/24 06:42 06/27/24 11:30 07/03/24 12:14   Eosinophils Absolute 0.05 - 0.50 k/uL 0.12 0.22 0.21 0.13     Micro:  No results for input(s): \"CULTRESP\" in the last 72 hours.  No results for input(s): \"LABGRAM\" in the last 72 hours.  No results for input(s): \"LEGUR\" in the last 72 hours.  No results for input(s): \"STREPNEUMAGU\" in the last 72 hours.  No results for input(s): \"LP1UAG\" in the last 72 hours.      Assessment:  Acute respiratory failure with hypoxia  Severe persistent asthma with exacerbation.  Restriction on prior PFTs  Pulmonary edema  Exacerbation of heart failure with preserved EF, euvolemia on exam  Community-acquired pneumonia  Ongoing nicotine dependence 25 pack years  Nocturnal home O2 dependence 3-3.5 L at bedtime  Marijuana use  History of COVID-19 infection  CAD with history of CABG  Hypertension  Diabetes mellitus type 2  Peripheral vascular disease s/p arthrectomy left SFA and popliteal 2018>> initiated on DAPT at that time   CKD stage III  Obesity BMI 32    Plan:  Oxygen therapy wean to keep greater than 92% baseline is nocturnal O2 3-3.5 L into NIV device  NIV-BiPAP-adjustment to AVAPS.  Adjust to volume 450 EPAP +8 backup rate 16 FiO2 50% patient uses Home noninvasive vent trilogy  Follow ABGs as needed  Chest imaging reviewed.  Complaining of right neck swelling difficulty swallowing and tenderness.  Will order noncontrast CT neck, noncontrast CT chest to further evaluate pneumonia and lung parenchyma  Scheduled bronchodilators Brovana, budesonide, DuoNebs.  Home regimen Symbicort and albuterol for rescue.  Add EZ Pap.  Monitor peak flow daily  Solu-Medrol with taper to prednisone written  Rocephin and Doxy for CAP coverage.  Procalcitonin 0.04, CRP less than 3  Incentive spirometer, flutter valve  Tobacco cessation counseling, NicoDerm patch  Management of heart failure per cardiology recommendations-Entresto, Imdur, 
[phone]  [email or web address], Al Keenan MD - Lab. Director     2016 110 (H) 0 - 37 U/mL Final     Comment:     INTERPRETIVE INFORMATION: Cancer Antigen-GI (CA 19-9)  This test uses Roche CA 19-9 electrochemiluminescent immunoassay.  Results  obtained with different test methods or kits cannot be used  interchangeably.  CA 19-9 value is useful in monitoring pancreatic, hepatobiliary,  gastric,  hepatocellular, and colorectal cancer. CA 19-9 value, regardless of  level,  should not be interpreted as absolute evidence of the presence or  absence of  malignant disease.  Performed by [facility],  [address], [facility] [place] [address] [phone]  [email or web address], Al Keenan MD - Lab. Director          Previous Endoscopies: Date of last Colonoscopy: 10/31/2018  No flexible sigmoidoscopy on file    10/31/2018 panendoscopy with Dr.Sayed Coleman  Diverticulosis, pt states she had 9 polyps, did not see that in the OP note.    2018 Colonsocopy  Rectal polyp x 3, Inflammatory and hyperplastic polyp fragments. Diverticulosis      ASSESSMENT: Patient states over the last 3 days, she has noticed some blood in her stool.  She has been having some red as well as some black bowel movements.  She has been having some significant pain in the right lower quadrant of the abdomen that she says is \"hard\".  She also says she was diagnosed with pneumonia at [place] and discharged home.  She feels she is breathing harder than normal.  Patient wears 3 L of oxygen at home. GI consulted for evaluation of blood in stool, hx of IBS.    Mother  2/2 colon cancer  Brother diagnosed with colon cancer  Pt with a personal history colon polyps.  Colonoscopy was canceled 24 with Dr. Moeller  States EGD was attempted last year but pt stopped breathing.   Has a longstanding history of nausea/vomiting and diarrhea followed by Lindsay Gray MD [facility], and Dr. Moeller. Question of diabetic gastroparesis.      PLAN:

## 2025-03-30 NOTE — CARE COORDINATION
Discharge order noted.  Pt with large BM over night per nursing documentation.  DME delivered yesterday.  [facility] to provide HC at discharge.  Appropriate follow-ups on the AVS.   Family to provide transportation home at discharge.  Anticipate discharge to home today.       Samara Morris RN.  P:  [phone]

## 2025-03-31 ENCOUNTER — TELEPHONE (OUTPATIENT)
Dept: FAMILY MEDICINE CLINIC | Age: 70
End: 2025-03-31

## 2025-03-31 NOTE — TELEPHONE ENCOUNTER
Care Transitions Initial Follow Up Call    Outreach made within 2 business days of discharge: Yes    Patient: Steph Gonzáles Patient : 1955   MRN: [id number]  Reason for Admission: Shortness of breath, abdominal pain   Discharge Date: 3/30/25       Spoke with: patient    Discharge department/facility: [facility] Interactive Patient Contact:  Was patient able to fill all prescriptions: Yes  Was patient instructed to bring all medications to the follow-up visit: Yes  Is patient taking all medications as directed in the discharge summary? Yes  Does patient understand their discharge instructions: Yes  Does patient have questions or concerns that need addressed prior to 7-14 day follow up office visit: no    Additional needs identified to be addressed with provider  No needs identified             Scheduled appointment with PCP within 7-14 days    Follow Up  Future Appointments   Date Time Provider Department Center   2025 10:30 AM Ulises Skinner DO [place] [facility] ECC DEP   4/3/2025  1:[facility] [place] HOD   2025 10:00 AM SEB CT2 BD [facility] CT SEB Radiolog   2025 12:00 PM [facility] ROOM 2 [facility]   2025  9:40 AM Danielle Morrison APRN - NP [facility] ENDO [facility]   2025  1:45 PM Pino Hernandez MD VASC/MED [facility]   2026 10:15 AM Ulises Skinner DO [place] [facility] DEP       MAJO PENN MA

## 2025-04-02 ENCOUNTER — HOSPITAL ENCOUNTER (OUTPATIENT)
Dept: OTHER | Age: 70
Setting detail: THERAPIES SERIES
Discharge: HOME | End: 2025-04-02
Payer: MEDICARE

## 2025-04-02 VITALS
BODY MASS INDEX: 34.61 KG/M2 | SYSTOLIC BLOOD PRESSURE: 111 MMHG | WEIGHT: 189.2 LBS | DIASTOLIC BLOOD PRESSURE: 45 MMHG | RESPIRATION RATE: 18 BRPM | HEART RATE: 85 BPM | OXYGEN SATURATION: 93 %

## 2025-04-02 LAB
ANION GAP SERPL CALCULATED.3IONS-SCNC: 9 MMOL/L (ref 7–16)
BNP SERPL-MCNC: 818 PG/ML (ref 0–125)
BUN SERPL-MCNC: 31 MG/DL (ref 6–23)
CALCIUM SERPL-MCNC: 9.3 MG/DL (ref 8.6–10.2)
CHLORIDE SERPL-SCNC: 102 MMOL/L (ref 98–107)
CO2 SERPL-SCNC: 29 MMOL/L (ref 22–29)
CREAT SERPL-MCNC: 1.9 MG/DL (ref 0.5–1)
GFR, ESTIMATED: 28 ML/MIN/1.73M2
GLUCOSE SERPL-MCNC: 181 MG/DL (ref 74–99)
POTASSIUM SERPL-SCNC: 4.8 MMOL/L (ref 3.5–5)
SODIUM SERPL-SCNC: 140 MMOL/L (ref 132–146)

## 2025-04-02 PROCEDURE — 80048 BASIC METABOLIC PNL TOTAL CA: CPT

## 2025-04-02 PROCEDURE — 99215 OFFICE O/P EST HI 40 MIN: CPT

## 2025-04-02 PROCEDURE — 36415 COLL VENOUS BLD VENIPUNCTURE: CPT

## 2025-04-02 PROCEDURE — 99214 OFFICE O/P EST MOD 30 MIN: CPT

## 2025-04-02 PROCEDURE — 83880 ASSAY OF NATRIURETIC PEPTIDE: CPT

## 2025-04-02 NOTE — PROGRESS NOTES
Congestive Heart Failure Clinic   [facility]      Referring Provider: iLta WATT  Primary Care Physician: Ulises Skinner DO   Cardiologist: Dr Boateng  Nephrologist: Dr Hunter       HISTORY OF PRESENT ILLNESS:     Steph Gonzáles is a 69 y.o. (1955) female with a history of HFpEF (EF> 50%)  Pre Cupid:     Lab Results   Component Value Date    LVEF 55 05/09/2024     Post Cupid:    Lab Results   Component Value Date    EFBP 56 05/09/2024         She presents to the CHF clinic for ongoing evaluation and monitoring of heart failure.    In the CHF clinic today she denies any adverse symptoms except:  [x] Dizziness or lightheadedness, since hospital admission, unchanged   [] Syncope or near syncope  [] Recent Fall  [x] Shortness of breath at rest, unchanged per patient    [x] Dyspnea with exertion, unchanged per patient   [] Decline in functional capacity (unable to perform activities they had previously been able to do)  [x] Fatigue   [] Orthopnea  [] PND  [] Nocturnal cough  [] Hemoptysis  [] Chest pain, pressure, or discomfort  [] Palpitations  [] Abdominal distention  [] Abdominal bloating  [] Early satiety  [] Blood in stool   [] Diarrhea  [] Constipation  [] Nausea/Vomiting  [] Decreased urinary response to oral diuretic   [] Scrotal swelling   [] Lower extremity edema  [] Used PRN doses of oral diuretic   [] Weight gain    Wt Readings from Last 3 Encounters:   04/02/25 85.8 kg (189 lb 3.2 oz)   03/28/25 81.2 kg (179 lb)   03/05/25 88.9 kg (196 lb)           SOCIAL HISTORY:  [x] Denies tobacco, alcohol or illicit drug abuse  [] Tobacco use:  [] ETOH use:  [] Illicit drug use:        MEDICATIONS:    Allergies   Allergen Reactions    Latex Hives    Bee Venom Anaphylaxis    Dilaudid [Hydromorphone Hcl] Itching    Dye [Iodides] Hives and Shortness Of Breath    Keflex [Cephalexin] Itching and Rash    Bee Pollen     Cetirizine & Related Hives

## 2025-04-03 ENCOUNTER — OFFICE VISIT (OUTPATIENT)
Dept: FAMILY MEDICINE CLINIC | Age: 70
End: 2025-04-03

## 2025-04-03 VITALS
HEART RATE: 91 BPM | OXYGEN SATURATION: 97 % | SYSTOLIC BLOOD PRESSURE: 94 MMHG | TEMPERATURE: 97.7 F | RESPIRATION RATE: 18 BRPM | HEIGHT: 62 IN | WEIGHT: 190 LBS | BODY MASS INDEX: 34.96 KG/M2 | DIASTOLIC BLOOD PRESSURE: 56 MMHG

## 2025-04-03 DIAGNOSIS — I50.32 CHRONIC DIASTOLIC HEART FAILURE (HCC): ICD-10-CM

## 2025-04-03 DIAGNOSIS — I73.9 PVD (PERIPHERAL VASCULAR DISEASE): ICD-10-CM

## 2025-04-03 DIAGNOSIS — G47.33 OSA AND COPD OVERLAP SYNDROME (HCC): Chronic | ICD-10-CM

## 2025-04-03 DIAGNOSIS — J44.9 OSA AND COPD OVERLAP SYNDROME (HCC): Chronic | ICD-10-CM

## 2025-04-03 DIAGNOSIS — R30.0 DYSURIA: ICD-10-CM

## 2025-04-03 DIAGNOSIS — R56.9 SEIZURE-LIKE ACTIVITY (HCC): ICD-10-CM

## 2025-04-03 DIAGNOSIS — N39.46 MIXED INCONTINENCE URGE AND STRESS: ICD-10-CM

## 2025-04-03 DIAGNOSIS — G89.29 OTHER CHRONIC PAIN: Primary | ICD-10-CM

## 2025-04-03 DIAGNOSIS — E66.2 OBESITY HYPOVENTILATION SYNDROME: ICD-10-CM

## 2025-04-03 DIAGNOSIS — Z99.81 O2 DEPENDENT: ICD-10-CM

## 2025-04-03 DIAGNOSIS — Z86.73 HISTORY OF STROKE: ICD-10-CM

## 2025-04-03 DIAGNOSIS — J18.9 PNEUMONIA OF BOTH LUNGS DUE TO INFECTIOUS ORGANISM, UNSPECIFIED PART OF LUNG: ICD-10-CM

## 2025-04-03 DIAGNOSIS — I25.5 ISCHEMIC CARDIOMYOPATHY: ICD-10-CM

## 2025-04-03 DIAGNOSIS — I25.10 CORONARY ARTERY DISEASE INVOLVING NATIVE CORONARY ARTERY OF NATIVE HEART WITHOUT ANGINA PECTORIS: Chronic | ICD-10-CM

## 2025-04-03 DIAGNOSIS — Z09 HOSPITAL DISCHARGE FOLLOW-UP: ICD-10-CM

## 2025-04-03 DIAGNOSIS — Z76.0 MEDICATION REFILL: ICD-10-CM

## 2025-04-03 RX ORDER — ACETAMINOPHEN 160 MG
2000 TABLET,DISINTEGRATING ORAL DAILY
Qty: 90 CAPSULE | Refills: 1 | Status: SHIPPED | OUTPATIENT
Start: 2025-04-03

## 2025-04-03 RX ORDER — TRAMADOL HYDROCHLORIDE 50 MG/1
50 TABLET ORAL 2 TIMES DAILY PRN
Qty: 15 TABLET | Refills: 0 | Status: SHIPPED | OUTPATIENT
Start: 2025-04-03 | End: 2025-05-03

## 2025-04-03 RX ORDER — DULOXETIN HYDROCHLORIDE 60 MG/1
60 CAPSULE, DELAYED RELEASE ORAL DAILY
Qty: 90 CAPSULE | Refills: 1 | Status: SHIPPED | OUTPATIENT
Start: 2025-04-03

## 2025-04-03 NOTE — PROGRESS NOTES
Post-Discharge Transitional Care  Follow Up      Steph Gonzáles   YOB: 1955    Date of Office Visit:  4/3/2025  Date of Hospital Admission: 3/23/25  Date of Hospital Discharge: 3/30/25  Risk of hospital readmission (high >=14%. Medium >=10%) :Readmission Risk Score: 16.8      Care management risk score Rising risk (score 2-5) and Complex Care (Scores >=6): No Risk Score On File     Non face to face  following discharge, date last encounter closed (first attempt may have been earlier): 03/31/2025    Call initiated 2 business days of discharge: Yes    ASSESSMENT/PLAN:   Other chronic pain  -     traMADol (ULTRAM) 50 MG tablet; Take 1 tablet by mouth 2 times daily as needed for Pain for up to 30 days. Take 1 tablet once daily when pain is severe. Do not take every day. Max Daily Amount: 100 mg, Disp-15 tablet, R-0Normal  Dysuria  -     Culture, Urine  Hospital discharge follow-up  -     NM DISCHARGE MEDS RECONCILED W/ CURRENT OUTPATIENT MED LIST  Seizure-like activity (HCC)  DAYAN and COPD overlap syndrome (HCC)  Chronic diastolic heart failure (HCC)  PVD (peripheral vascular disease)  Pneumonia of both lungs due to infectious organism, unspecified part of lung  O2 dependent  Mixed incontinence urge and stress  History of stroke  Coronary artery disease involving native coronary artery of native heart without angina pectoris  Ischemic cardiomyopathy  Obesity hypoventilation syndrome  Medication refill  -     DULoxetine (CYMBALTA) 60 MG extended release capsule; Take 1 capsule by mouth daily, Disp-90 capsule, R-1Normal  -     vitamin D (VITAMIN D3) 50 MCG (2000 UT) CAPS capsule; Take 1 capsule by mouth daily, Disp-90 capsule, R-1Normal    Patient is frustrated by lack of progress. She feels that we did not diagnose this problem on time. We reviewed prior workup that included cxr with evidence of fluid overload for which we increased bumex with instruction for close fu.  Unfortunately she did not

## 2025-04-04 ENCOUNTER — TELEPHONE (OUTPATIENT)
Dept: FAMILY MEDICINE CLINIC | Age: 70
End: 2025-04-04

## 2025-04-04 ENCOUNTER — TELEPHONE (OUTPATIENT)
Dept: ADMINISTRATIVE | Age: 70
End: 2025-04-04

## 2025-04-04 ENCOUNTER — RESULTS FOLLOW-UP (OUTPATIENT)
Dept: FAMILY MEDICINE CLINIC | Age: 70
End: 2025-04-04

## 2025-04-04 DIAGNOSIS — B37.9 YEAST INFECTION: Primary | ICD-10-CM

## 2025-04-04 RX ORDER — FLUCONAZOLE 150 MG/1
150 TABLET ORAL
Qty: 2 TABLET | Refills: 0 | Status: SHIPPED | OUTPATIENT
Start: 2025-04-04 | End: 2025-04-10

## 2025-04-04 NOTE — TELEPHONE ENCOUNTER
ASHLEY Romero with [facility] called in to advise pt is going to be getting OT eval and treat.

## 2025-04-04 NOTE — RESULT ENCOUNTER NOTE
There is no evidence of infection in her urine. If she has persistent symptoms of burning or urgency this may be related to slow bowels/constipation, pressure on bladder/bladder falling, or postmenopausal changes or perhaps a yeast infection. We could try treating a yeast infection if she wants (med sent) and then if that doesn't work we could have her see a uro-gynecologist to see if there is an issue with the bladder itself.

## 2025-04-05 LAB
CULTURE: NORMAL
SPECIMEN DESCRIPTION: NORMAL

## 2025-04-08 ENCOUNTER — TELEPHONE (OUTPATIENT)
Dept: NON INVASIVE DIAGNOSTICS | Age: 70
End: 2025-04-08

## 2025-04-08 ENCOUNTER — TELEPHONE (OUTPATIENT)
Dept: ADMINISTRATIVE | Age: 70
End: 2025-04-08

## 2025-04-08 DIAGNOSIS — R94.31 ABNORMAL EKG: Primary | ICD-10-CM

## 2025-04-08 NOTE — TELEPHONE ENCOUNTER
I spoke with patient, scheduled 4/15 w/ NP Travis    Electronically signed by Francesca Reyes MA on 4/8/25 at 2:46 PM EDT

## 2025-04-08 NOTE — TELEPHONE ENCOUNTER
Patient seen in hosp for right ear pain.  Needs follow up with Dr Diaz (OV 5/2024)  no soon appts to offer.  Please contact patient to schedule

## 2025-04-08 NOTE — TELEPHONE ENCOUNTER
LVM for patient to schedule appt    Electronically signed by Francesca Reyes MA on 4/8/25 at 11:38 AM EDT

## 2025-04-08 NOTE — TELEPHONE ENCOUNTER
2 pause alerts during sleeping hours on 4.7.2025 and 4.8.2025. Longest duration 17 seconds. Called patient and she stated she was sleeping at that time and she was wearing her bipap machine. No symptoms reported.    Margie Galarza CMA

## 2025-04-08 NOTE — TELEPHONE ENCOUNTER
Patient was called and left a voicemail to call the office back regarding an order that was placed for Echocardiogram.

## 2025-04-08 NOTE — TELEPHONE ENCOUNTER
Patient returned phone call regarding Echo. Patient was given the stress & echo phone number to schedule the echo test. Patient was also told to follow up. Patient agreed and understood instructions.

## 2025-04-09 DIAGNOSIS — K59.04 CHRONIC IDIOPATHIC CONSTIPATION: ICD-10-CM

## 2025-04-10 ENCOUNTER — TELEPHONE (OUTPATIENT)
Dept: FAMILY MEDICINE CLINIC | Age: 70
End: 2025-04-10

## 2025-04-10 ENCOUNTER — HOSPITAL ENCOUNTER (OUTPATIENT)
Dept: CARDIOLOGY | Age: 70
Discharge: HOME | End: 2025-04-12
Attending: INTERNAL MEDICINE
Payer: MEDICARE

## 2025-04-10 VITALS — BODY MASS INDEX: 34.96 KG/M2 | WEIGHT: 190 LBS | HEIGHT: 62 IN

## 2025-04-10 DIAGNOSIS — R94.31 ABNORMAL EKG: ICD-10-CM

## 2025-04-10 PROCEDURE — 93306 TTE W/DOPPLER COMPLETE: CPT

## 2025-04-10 RX ORDER — DOCUSATE SODIUM 100 MG/1
CAPSULE, LIQUID FILLED ORAL
Qty: 60 CAPSULE | Refills: 0 | Status: SHIPPED | OUTPATIENT
Start: 2025-04-10

## 2025-04-10 NOTE — TELEPHONE ENCOUNTER
Name of Medication(s) Requested:  Requested Prescriptions     Pending Prescriptions Disp Refills    docusate sodium (COLACE) 100 MG capsule [Pharmacy Med Name: DOCUSATE SODIUM 100 MG SOFTGEL] 60 capsule 0     Sig: TAKE 1 CAPSULE BY MOUTH TWICE A DAY AS NEEDED FOR CONSTIPATION       Medication is on current medication list Yes    Dosage and directions were verified? Yes    Quantity verified: 30 day supply     Pharmacy Verified?  Yes    Last Appointment:  4/3/2025    Future appts:  Future Appointments   Date Time Provider Department Center   4/10/2025 12:30 PM SEY SANTANA ECHO 3 SEYZ GAY [facility] Rad/Car   4/12/2025 10:00 AM SEB CT2 BD SEBZ CT [facility] Radiolog   4/15/2025  9:20 AM Gabino Boateng MD [facility]   4/15/2025  3:00 PM SCHEDULE, JAYME JOE AUDIO [facility]   4/15/2025  3:15 PM Kenyon Robles, APRN - CNP [facility]   5/12/2025 10:20 AM Ivon Ott APRN - CNP [facility] NEURO Neurology -   6/4/2025  9:40 AM Danielle Morrison, APRN - NP [facility] ENDO [facility]   10/2/2025 10:15 AM DAVID [facility] ROOM 3 DAVID [facility] Dr[facility] HOD   11/13/2025  1:45 PM Pino Hernandez MD VASC/MED [facility]   2/24/2026 10:15 AM Ulises Skinner DO [facility] ECC DEP        (If no appt send self scheduling link. .REFILLAPPT)  Scheduling request sent?     [] Yes  [] No    Does patient need updated?  [] Yes  [] No

## 2025-04-11 LAB
ECHO AO SINUS VALSALVA DIAM: 2.7 CM
ECHO AO SINUS VALSALVA INDEX: 1.44 CM/M2
ECHO AV AREA PEAK VELOCITY: 1.8 CM2
ECHO AV AREA VTI: 2 CM2
ECHO AV AREA/BSA PEAK VELOCITY: 1 CM2/M2
ECHO AV AREA/BSA VTI: 1.1 CM2/M2
ECHO AV CUSP MM: 2 CM
ECHO AV MEAN GRADIENT: 7 MMHG
ECHO AV MEAN VELOCITY: 1.2 M/S
ECHO AV PEAK GRADIENT: 11 MMHG
ECHO AV PEAK VELOCITY: 1.7 M/S
ECHO AV VELOCITY RATIO: 0.53
ECHO AV VTI: 31.8 CM
ECHO BSA: 1.94 M2
ECHO EST RA PRESSURE: 3 MMHG
ECHO LA DIAMETER INDEX: 2.51 CM/M2
ECHO LA DIAMETER: 4.7 CM
ECHO LA VOL A-L A2C: 74 ML (ref 22–52)
ECHO LA VOL A-L A4C: 82 ML (ref 22–52)
ECHO LA VOL MOD A2C: 73 ML (ref 22–52)
ECHO LA VOL MOD A4C: 80 ML (ref 22–52)
ECHO LA VOLUME AREA LENGTH: 83 ML
ECHO LA VOLUME INDEX A-L A2C: 40 ML/M2 (ref 16–34)
ECHO LA VOLUME INDEX A-L A4C: 44 ML/M2 (ref 16–34)
ECHO LA VOLUME INDEX AREA LENGTH: 44 ML/M2 (ref 16–34)
ECHO LA VOLUME INDEX MOD A2C: 39 ML/M2 (ref 16–34)
ECHO LA VOLUME INDEX MOD A4C: 43 ML/M2 (ref 16–34)
ECHO LV EF PHYSICIAN: 64 %
ECHO LV FRACTIONAL SHORTENING: 38 % (ref 28–44)
ECHO LV INTERNAL DIMENSION DIASTOLE INDEX: 2.25 CM/M2
ECHO LV INTERNAL DIMENSION DIASTOLIC: 4.2 CM (ref 3.9–5.3)
ECHO LV INTERNAL DIMENSION SYSTOLIC INDEX: 1.39 CM/M2
ECHO LV INTERNAL DIMENSION SYSTOLIC: 2.6 CM
ECHO LV ISOVOLUMETRIC RELAXATION TIME (IVRT): 76.1 MS
ECHO LV IVSD: 2.1 CM (ref 0.6–0.9)
ECHO LV IVSS: 2.8 CM
ECHO LV MASS 2D: 381.1 G (ref 67–162)
ECHO LV MASS INDEX 2D: 203.8 G/M2 (ref 43–95)
ECHO LV POSTERIOR WALL DIASTOLIC: 1.8 CM (ref 0.6–0.9)
ECHO LV POSTERIOR WALL SYSTOLIC: 2.2 CM
ECHO LV RELATIVE WALL THICKNESS RATIO: 0.86
ECHO LVOT AREA: 3.1 CM2
ECHO LVOT AV VTI INDEX: 0.62
ECHO LVOT DIAM: 2 CM
ECHO LVOT MEAN GRADIENT: 2 MMHG
ECHO LVOT PEAK GRADIENT: 4 MMHG
ECHO LVOT PEAK VELOCITY: 0.9 M/S
ECHO LVOT STROKE VOLUME INDEX: 32.9 ML/M2
ECHO LVOT SV: 61.5 ML
ECHO LVOT VTI: 19.6 CM
ECHO MV "A" WAVE DURATION: 133.8 MSEC
ECHO MV A VELOCITY: 1.25 M/S
ECHO MV AREA PHT: 4.2 CM2
ECHO MV AREA VTI: 2.1 CM2
ECHO MV E DECELERATION TIME (DT): 229.9 MS
ECHO MV E VELOCITY: 1.15 M/S
ECHO MV E/A RATIO: 0.92
ECHO MV LVOT VTI INDEX: 1.5
ECHO MV MAX VELOCITY: 1.5 M/S
ECHO MV MEAN GRADIENT: 6 MMHG
ECHO MV MEAN VELOCITY: 1.2 M/S
ECHO MV PEAK GRADIENT: 9 MMHG
ECHO MV PRESSURE HALF TIME (PHT): 52.2 MS
ECHO MV VTI: 29.4 CM
ECHO PV MAX VELOCITY: 1 M/S
ECHO PV MEAN GRADIENT: 2 MMHG
ECHO PV MEAN VELOCITY: 0.7 M/S
ECHO PV PEAK GRADIENT: 4 MMHG
ECHO PV VTI: 18.4 CM
ECHO PVEIN A DURATION: 92.3 MS
ECHO PVEIN A VELOCITY: 0.2 M/S
ECHO PVEIN PEAK D VELOCITY: 0.6 M/S
ECHO PVEIN PEAK S VELOCITY: 0.3 M/S
ECHO PVEIN S/D RATIO: 0.5
ECHO RIGHT VENTRICULAR SYSTOLIC PRESSURE (RVSP): 33 MMHG
ECHO RV INTERNAL DIMENSION: 3 CM
ECHO RV TAPSE: 1.9 CM (ref 1.7–?)
ECHO TV REGURGITANT MAX VELOCITY: 2.72 M/S
ECHO TV REGURGITANT PEAK GRADIENT: 30 MMHG

## 2025-04-12 ENCOUNTER — HOSPITAL ENCOUNTER (OUTPATIENT)
Dept: CT IMAGING | Age: 70
Discharge: HOME | End: 2025-04-14
Payer: MEDICARE

## 2025-04-12 DIAGNOSIS — R06.02 SHORTNESS OF BREATH: ICD-10-CM

## 2025-04-12 PROCEDURE — 71250 CT THORAX DX C-: CPT

## 2025-04-15 ENCOUNTER — RESULTS FOLLOW-UP (OUTPATIENT)
Dept: FAMILY MEDICINE CLINIC | Age: 70
End: 2025-04-15

## 2025-04-16 ENCOUNTER — TELEPHONE (OUTPATIENT)
Dept: FAMILY MEDICINE CLINIC | Age: 70
End: 2025-04-16

## 2025-04-16 NOTE — TELEPHONE ENCOUNTER
Low BS 68   Waking up in the middle of the night w/ low BS.  Schooled to keep snacks by bed and to eat carbs before bed.

## 2025-04-25 ENCOUNTER — TELEPHONE (OUTPATIENT)
Dept: FAMILY MEDICINE CLINIC | Age: 70
End: 2025-04-25

## 2025-04-25 NOTE — TELEPHONE ENCOUNTER
AUGUSTUSI--  [facility] called in states pt will be d/c from OT, she has met all goals.

## 2025-04-28 ENCOUNTER — OFFICE VISIT (OUTPATIENT)
Dept: CARDIOLOGY CLINIC | Age: 70
End: 2025-04-28
Payer: MEDICARE

## 2025-04-28 ENCOUNTER — TELEPHONE (OUTPATIENT)
Dept: CARDIOLOGY CLINIC | Age: 70
End: 2025-04-28

## 2025-04-28 VITALS
WEIGHT: 190.2 LBS | SYSTOLIC BLOOD PRESSURE: 136 MMHG | RESPIRATION RATE: 18 BRPM | HEART RATE: 92 BPM | TEMPERATURE: 97 F | BODY MASS INDEX: 35 KG/M2 | OXYGEN SATURATION: 93 % | HEIGHT: 62 IN | DIASTOLIC BLOOD PRESSURE: 70 MMHG

## 2025-04-28 DIAGNOSIS — I50.32 HEART FAILURE WITH IMPROVED EJECTION FRACTION (HFIMPEF) (HCC): ICD-10-CM

## 2025-04-28 DIAGNOSIS — Z76.0 MEDICATION REFILL: ICD-10-CM

## 2025-04-28 DIAGNOSIS — F17.200 TOBACCO USE DISORDER: ICD-10-CM

## 2025-04-28 DIAGNOSIS — I25.10 CORONARY ARTERY DISEASE INVOLVING NATIVE CORONARY ARTERY OF NATIVE HEART WITHOUT ANGINA PECTORIS: Primary | ICD-10-CM

## 2025-04-28 DIAGNOSIS — I10 PRIMARY HYPERTENSION: ICD-10-CM

## 2025-04-28 DIAGNOSIS — E78.00 PURE HYPERCHOLESTEROLEMIA: ICD-10-CM

## 2025-04-28 DIAGNOSIS — Z95.1 S/P CABG X 3: ICD-10-CM

## 2025-04-28 PROCEDURE — 1090F PRES/ABSN URINE INCON ASSESS: CPT | Performed by: INTERNAL MEDICINE

## 2025-04-28 PROCEDURE — 1123F ACP DISCUSS/DSCN MKR DOCD: CPT | Performed by: INTERNAL MEDICINE

## 2025-04-28 PROCEDURE — 3017F COLORECTAL CA SCREEN DOC REV: CPT | Performed by: INTERNAL MEDICINE

## 2025-04-28 PROCEDURE — 1159F MED LIST DOCD IN RCRD: CPT | Performed by: INTERNAL MEDICINE

## 2025-04-28 PROCEDURE — 93000 ELECTROCARDIOGRAM COMPLETE: CPT | Performed by: INTERNAL MEDICINE

## 2025-04-28 PROCEDURE — 4004F PT TOBACCO SCREEN RCVD TLK: CPT | Performed by: INTERNAL MEDICINE

## 2025-04-28 PROCEDURE — G8399 PT W/DXA RESULTS DOCUMENT: HCPCS | Performed by: INTERNAL MEDICINE

## 2025-04-28 PROCEDURE — 3075F SYST BP GE 130 - 139MM HG: CPT | Performed by: INTERNAL MEDICINE

## 2025-04-28 PROCEDURE — 1160F RVW MEDS BY RX/DR IN RCRD: CPT | Performed by: INTERNAL MEDICINE

## 2025-04-28 PROCEDURE — 3078F DIAST BP <80 MM HG: CPT | Performed by: INTERNAL MEDICINE

## 2025-04-28 PROCEDURE — 99214 OFFICE O/P EST MOD 30 MIN: CPT | Performed by: INTERNAL MEDICINE

## 2025-04-28 PROCEDURE — G8427 DOCREV CUR MEDS BY ELIG CLIN: HCPCS | Performed by: INTERNAL MEDICINE

## 2025-04-28 PROCEDURE — 1111F DSCHRG MED/CURRENT MED MERGE: CPT | Performed by: INTERNAL MEDICINE

## 2025-04-28 PROCEDURE — G8417 CALC BMI ABV UP PARAM F/U: HCPCS | Performed by: INTERNAL MEDICINE

## 2025-04-28 PROCEDURE — G2211 COMPLEX E/M VISIT ADD ON: HCPCS | Performed by: INTERNAL MEDICINE

## 2025-04-28 RX ORDER — SACUBITRIL AND VALSARTAN 49; 51 MG/1; MG/1
1 TABLET, FILM COATED ORAL 2 TIMES DAILY
Qty: 60 TABLET | Refills: 11 | Status: SHIPPED | OUTPATIENT
Start: 2025-04-28

## 2025-04-28 RX ORDER — ASPIRIN 81 MG/1
81 TABLET ORAL DAILY
Qty: 90 TABLET | Refills: 3 | Status: SHIPPED | OUTPATIENT
Start: 2025-04-28

## 2025-04-28 RX ORDER — MINOXIDIL 2.5 MG/1
2.5 TABLET ORAL 2 TIMES DAILY
Qty: 180 TABLET | Refills: 1 | Status: SHIPPED | OUTPATIENT
Start: 2025-04-28

## 2025-04-28 RX ORDER — ISOSORBIDE MONONITRATE 60 MG/1
60 TABLET, EXTENDED RELEASE ORAL DAILY
Qty: 90 TABLET | Refills: 3 | Status: SHIPPED | OUTPATIENT
Start: 2025-04-28

## 2025-04-28 RX ORDER — SIMVASTATIN 20 MG
20 TABLET ORAL NIGHTLY
Qty: 90 TABLET | Refills: 3 | Status: SHIPPED | OUTPATIENT
Start: 2025-04-28

## 2025-04-28 NOTE — PROGRESS NOTES
OUTPATIENT CARDIOLOGY FOLLOW-UP    Name: Steph Gonzáles    Age: 69 y.o.    Primary Care Physician: Ulises Skinner DO    Date of Service: 4/28/2025    Chief Complaint:   Chief Complaint   Patient presents with    Follow-Up from Hospital       Interim History:  Mrs. Gonzáles is a 69-year-old  female with history of for coronary artery disease underwent CABG in 2011 and history of severe LV dysfunction with an EF of 25% based on echocardiogram done in 2013, cardiac cath in 2013 showed no progression of disease, chronic right bundle-branch block, type II diabetes, hypertension, hyperlipidemia, morbid obesity, active tobacco use still smoking about 10 cigarettes a day, marijuana use, obstructive sleep apnea on CPAP and history of severe peripheral vascular disease came for follow-up visit. She was last seen in the office was on 7/6/2022, Since her last evaluation, she underwent hiatal hernia surgery on 8/11/22 without any perioperative cardiac events. She was also hospitalized in 8/22 with COPD exacerbation.     She was hospitalized from 7/15/2021 to 7/21/2021 with acute hypoxic respiratory failure secondary to acute COPD exacerbation and acute on chronic HFpEF.  Since she was discharged from the hospital she is feeling much better however, she still smoking marijuana and tobacco.  She denies any significant leg edema.      she was hospitalized from 4/19/2021 to 4/25/2021 with a COVID-19 pneumonia and was treated with steroids and antibiotics..  She underwent left popliteal and tibial trunk atherectomy on 11/13/2018 and was initiated on plavix and aspirin.    She was discharged home on oxygen and she has been using mainly at nighttime. She denies any chest pain, increased dyspnea or leg edema. She is still smoking about 5 cigarettes a day and does not want to quit.      Last visit in 6/23, went to ER, 3 times, in 12/5/23 with fall , in 12/10/23 with metabolic encephalopathy from UTI  and chest pain on

## 2025-04-28 NOTE — PATIENT INSTRUCTIONS
Patient was advised to follow-up with Dr. Hernandez from vascular surgery as scheduled.    She is on guideline directed medical therapy for heart failure with improved ejection fraction, continue Entresto 49/51  mg  p.o. twice daily,   Imdur 60 mg po daily and hydralazine 100 mg po 3 times a day  Minoxidil 2.5 mg po twice a day for hypertension.   Follow up with EP service as scheduled.   She was strongly encouraged to adhere to strict cardiac diet and restrict daily salt intake to less than 2 g  Continue Imdur 60 mg p.o. daily.   She was advised again to stop smoking and told me that she does not want to quit.    Advised to take Bumex 1 mg as needed for leg edema.  Follow up in 6 months.

## 2025-04-28 NOTE — TELEPHONE ENCOUNTER
Patient calling to schedule her Hospital follow up. She stated she will need refills on all of her medications. Please contact.

## 2025-05-01 ENCOUNTER — PROCEDURE VISIT (OUTPATIENT)
Dept: AUDIOLOGY | Age: 70
End: 2025-05-01

## 2025-05-01 ENCOUNTER — OFFICE VISIT (OUTPATIENT)
Dept: ENT CLINIC | Age: 70
End: 2025-05-01
Payer: MEDICARE

## 2025-05-01 ENCOUNTER — HOSPITAL ENCOUNTER (OUTPATIENT)
Age: 70
Discharge: HOME | End: 2025-05-01
Payer: MEDICARE

## 2025-05-01 VITALS
OXYGEN SATURATION: 92 % | RESPIRATION RATE: 18 BRPM | TEMPERATURE: 97.7 F | HEART RATE: 105 BPM | HEIGHT: 62 IN | DIASTOLIC BLOOD PRESSURE: 68 MMHG | SYSTOLIC BLOOD PRESSURE: 128 MMHG | BODY MASS INDEX: 34.79 KG/M2

## 2025-05-01 DIAGNOSIS — H69.93 DYSFUNCTION OF BOTH EUSTACHIAN TUBES: Primary | ICD-10-CM

## 2025-05-01 DIAGNOSIS — J30.1 SEASONAL ALLERGIC RHINITIS DUE TO POLLEN: Primary | ICD-10-CM

## 2025-05-01 DIAGNOSIS — H65.191 ACUTE EFFUSION OF RIGHT EAR: ICD-10-CM

## 2025-05-01 LAB
25(OH)D3 SERPL-MCNC: 36 NG/ML (ref 30–100)
ALBUMIN SERPL-MCNC: 4.2 G/DL (ref 3.5–5.2)
ALP SERPL-CCNC: 75 U/L (ref 35–104)
ALT SERPL-CCNC: 24 U/L (ref 0–32)
ANION GAP SERPL CALCULATED.3IONS-SCNC: 10 MMOL/L (ref 7–16)
AST SERPL-CCNC: 13 U/L (ref 0–31)
BASOPHILS # BLD: 0.02 K/UL (ref 0–0.2)
BASOPHILS NFR BLD: 0 % (ref 0–2)
BILIRUB SERPL-MCNC: 0.3 MG/DL (ref 0–1.2)
BUN SERPL-MCNC: 28 MG/DL (ref 6–23)
CALCIUM SERPL-MCNC: 8.9 MG/DL (ref 8.6–10.2)
CHLORIDE SERPL-SCNC: 99 MMOL/L (ref 98–107)
CHOLEST SERPL-MCNC: 129 MG/DL
CO2 SERPL-SCNC: 27 MMOL/L (ref 22–29)
CREAT SERPL-MCNC: 1.5 MG/DL (ref 0.5–1)
EOSINOPHIL # BLD: 0.13 K/UL (ref 0.05–0.5)
EOSINOPHILS RELATIVE PERCENT: 2 % (ref 0–6)
ERYTHROCYTE [DISTWIDTH] IN BLOOD BY AUTOMATED COUNT: 12.8 % (ref 11.5–15)
GFR, ESTIMATED: 38 ML/MIN/1.73M2
GLUCOSE SERPL-MCNC: 121 MG/DL (ref 74–99)
HBA1C MFR BLD: 5.7 % (ref 4–5.6)
HCT VFR BLD AUTO: 37 % (ref 34–48)
HDLC SERPL-MCNC: 46 MG/DL
HGB BLD-MCNC: 11 G/DL (ref 11.5–15.5)
IMM GRANULOCYTES # BLD AUTO: <0.03 K/UL (ref 0–0.58)
IMM GRANULOCYTES NFR BLD: 0 % (ref 0–5)
LDLC SERPL CALC-MCNC: 64 MG/DL
LYMPHOCYTES NFR BLD: 1.05 K/UL (ref 1.5–4)
LYMPHOCYTES RELATIVE PERCENT: 14 % (ref 20–42)
MCH RBC QN AUTO: 30.7 PG (ref 26–35)
MCHC RBC AUTO-ENTMCNC: 29.7 G/DL (ref 32–34.5)
MCV RBC AUTO: 103.4 FL (ref 80–99.9)
MONOCYTES NFR BLD: 0.34 K/UL (ref 0.1–0.95)
MONOCYTES NFR BLD: 5 % (ref 2–12)
NEUTROPHILS NFR BLD: 79 % (ref 43–80)
NEUTS SEG NFR BLD: 5.74 K/UL (ref 1.8–7.3)
PHOSPHATE SERPL-MCNC: 3.5 MG/DL (ref 2.5–4.5)
PLATELET # BLD AUTO: 288 K/UL (ref 130–450)
PMV BLD AUTO: 10 FL (ref 7–12)
POTASSIUM SERPL-SCNC: 4.3 MMOL/L (ref 3.5–5)
PROT SERPL-MCNC: 7.1 G/DL (ref 6.4–8.3)
PTH-INTACT SERPL-MCNC: 60.1 PG/ML (ref 15–65)
RBC # BLD AUTO: 3.58 M/UL (ref 3.5–5.5)
SODIUM SERPL-SCNC: 136 MMOL/L (ref 132–146)
TRIGL SERPL-MCNC: 96 MG/DL
VLDLC SERPL CALC-MCNC: 19 MG/DL
WBC OTHER # BLD: 7.3 K/UL (ref 4.5–11.5)

## 2025-05-01 PROCEDURE — 3078F DIAST BP <80 MM HG: CPT

## 2025-05-01 PROCEDURE — G8399 PT W/DXA RESULTS DOCUMENT: HCPCS

## 2025-05-01 PROCEDURE — 36415 COLL VENOUS BLD VENIPUNCTURE: CPT

## 2025-05-01 PROCEDURE — 3017F COLORECTAL CA SCREEN DOC REV: CPT

## 2025-05-01 PROCEDURE — 80053 COMPREHEN METABOLIC PANEL: CPT

## 2025-05-01 PROCEDURE — 83036 HEMOGLOBIN GLYCOSYLATED A1C: CPT

## 2025-05-01 PROCEDURE — G8417 CALC BMI ABV UP PARAM F/U: HCPCS

## 2025-05-01 PROCEDURE — 82570 ASSAY OF URINE CREATININE: CPT

## 2025-05-01 PROCEDURE — 85025 COMPLETE CBC W/AUTO DIFF WBC: CPT

## 2025-05-01 PROCEDURE — 83970 ASSAY OF PARATHORMONE: CPT

## 2025-05-01 PROCEDURE — 82306 VITAMIN D 25 HYDROXY: CPT

## 2025-05-01 PROCEDURE — 1090F PRES/ABSN URINE INCON ASSESS: CPT

## 2025-05-01 PROCEDURE — 1123F ACP DISCUSS/DSCN MKR DOCD: CPT

## 2025-05-01 PROCEDURE — G8427 DOCREV CUR MEDS BY ELIG CLIN: HCPCS

## 2025-05-01 PROCEDURE — 82043 UR ALBUMIN QUANTITATIVE: CPT

## 2025-05-01 PROCEDURE — 84100 ASSAY OF PHOSPHORUS: CPT

## 2025-05-01 PROCEDURE — 99213 OFFICE O/P EST LOW 20 MIN: CPT

## 2025-05-01 PROCEDURE — 80061 LIPID PANEL: CPT

## 2025-05-01 PROCEDURE — 4004F PT TOBACCO SCREEN RCVD TLK: CPT

## 2025-05-01 PROCEDURE — 3074F SYST BP LT 130 MM HG: CPT

## 2025-05-01 RX ORDER — AZELASTINE 1 MG/ML
1 SPRAY, METERED NASAL 2 TIMES DAILY
Qty: 30 ML | Refills: 1 | Status: SHIPPED | OUTPATIENT
Start: 2025-05-01

## 2025-05-01 RX ORDER — AMOXICILLIN AND CLAVULANATE POTASSIUM 500; 125 MG/1; MG/1
1 TABLET, FILM COATED ORAL 2 TIMES DAILY
Qty: 20 TABLET | Refills: 0 | Status: SHIPPED | OUTPATIENT
Start: 2025-05-01 | End: 2025-05-11

## 2025-05-01 NOTE — PROGRESS NOTES
This patient was referred for tympanometric testing by SHUKRI Winchester due to ear pain.     Tympanometry revealed flat tympanograms, in the right ear and negative middle ear pressure (-129 daPa), in the left ear.    The results were reviewed with the patient and ordering provider.     Recommendations for follow up will be made pending ordering provider consult.    Carmen Ferrari/CCC-OCTAVIA  [place] Lic [id number]  Electronically signed by Carmen Ferrari on 5/1/2025 at 11:37 AM

## 2025-05-01 NOTE — PROGRESS NOTES
aSubjective:      Patient ID:  Steph Gonzáles is a 69 y.o. female.    HPI:    Patient presents today with a severe problem of the right ear. It started 4 months ago.  Patient states that nothing makes it better and nothing makes it worse.     Pain: yes   Side: right  Drainage:  no   Side: bilateral   Trauma history to the ear:    Side: bilateral   Desc:  none  Hearing Aids: yes  History of surgery to the head/neck: yes   Description: panendo  History of cerumen impaction: no  History of noise exposure: no   Type: none  Spinning: no  Hearing loss: yes    Fluctuating: no  Aural pressure: yes  Tinnitus: no  Otalgia: yes    Reports pain into the right neck and jaw.   Has been on flonae    Past Medical History:   Diagnosis Date    Acute kidney injury superimposed on CKD 04/12/2022    Asthma     Atherosclerosis of native artery of left leg with rest pain (HCC) 11/09/2018    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) 03/06/2024    Atrioventricular block 05/08/2024    CAD (coronary artery disease) 2011    Cellulitis and abscess of trunk 04/14/2015    Cerebellar infarct (HCC) 04/03/2019    Remote, rt. cerebellum, head CT scan, 1/9/19    Chronic back pain     Chronic kidney disease     Chronic systolic congestive heart failure (HCC) 10/04/2013    Chronic venous insufficiency 10/11/2017    COPD (chronic obstructive pulmonary disease) (HCC)     COVID-19     Depression     Diabetes mellitus (HCC)     Diabetic neuropathy (HCC)     Diverticulosis     Fatty liver 01/08/2016    per US    Glaucoma, open angle 02/01/2016    Mild-OU    Hepatic encephalopathy (HCC) 02/07/2016    resolved    Hiatal hernia     History of blood transfusion     Hyperlipidemia     Hyperplastic colon polyp     Hypertension     Incontinence     Liver mass     Lower limb ulcer, calf, right, limited to breakdown of skin (HCC) 03/06/2024    Lower limb ulcer, calf, right, with fat layer exposed (HCC) 04/01/2024

## 2025-05-02 LAB
CREAT UR-MCNC: 42.7 MG/DL (ref 29–226)
MICROALBUMIN UR-MCNC: 97 MG/L (ref 0–20)
MICROALBUMIN/CREAT UR-RTO: 227 MCG/MG CREAT (ref 0–30)

## 2025-05-09 ENCOUNTER — TELEPHONE (OUTPATIENT)
Dept: FAMILY MEDICINE CLINIC | Age: 70
End: 2025-05-09

## 2025-05-19 ENCOUNTER — TELEPHONE (OUTPATIENT)
Age: 70
End: 2025-05-19

## 2025-05-19 NOTE — TELEPHONE ENCOUNTER
Patient left a voicemail stating she is supposed to be ordered an at home EEG.   Are you ordering [employer] for her? I did ot see any mention of that in the office visit.

## 2025-05-22 ENCOUNTER — OFFICE VISIT (OUTPATIENT)
Dept: VASCULAR SURGERY | Age: 70
End: 2025-05-22
Payer: MEDICARE

## 2025-05-22 ENCOUNTER — TELEPHONE (OUTPATIENT)
Dept: VASCULAR SURGERY | Age: 70
End: 2025-05-22

## 2025-05-22 DIAGNOSIS — Z99.81 O2 DEPENDENT: ICD-10-CM

## 2025-05-22 DIAGNOSIS — M79.604 PAIN IN RIGHT LEG: ICD-10-CM

## 2025-05-22 DIAGNOSIS — F17.210 CIGARETTE SMOKER: Chronic | ICD-10-CM

## 2025-05-22 DIAGNOSIS — I73.9 PVD (PERIPHERAL VASCULAR DISEASE) WITH CLAUDICATION: Primary | ICD-10-CM

## 2025-05-22 DIAGNOSIS — F17.200 TOBACCO DEPENDENCE: ICD-10-CM

## 2025-05-22 DIAGNOSIS — Z98.62 STATUS POST PERIPHERAL ARTERY ANGIOPLASTY: ICD-10-CM

## 2025-05-22 PROBLEM — Z71.6 ENCOUNTER FOR TOBACCO USE CESSATION COUNSELING: Status: RESOLVED | Noted: 2017-07-05 | Resolved: 2025-05-22

## 2025-05-22 PROCEDURE — G8417 CALC BMI ABV UP PARAM F/U: HCPCS | Performed by: SURGERY

## 2025-05-22 PROCEDURE — 1159F MED LIST DOCD IN RCRD: CPT | Performed by: SURGERY

## 2025-05-22 PROCEDURE — G8399 PT W/DXA RESULTS DOCUMENT: HCPCS | Performed by: SURGERY

## 2025-05-22 PROCEDURE — 1160F RVW MEDS BY RX/DR IN RCRD: CPT | Performed by: SURGERY

## 2025-05-22 PROCEDURE — 3017F COLORECTAL CA SCREEN DOC REV: CPT | Performed by: SURGERY

## 2025-05-22 PROCEDURE — G8427 DOCREV CUR MEDS BY ELIG CLIN: HCPCS | Performed by: SURGERY

## 2025-05-22 PROCEDURE — 1090F PRES/ABSN URINE INCON ASSESS: CPT | Performed by: SURGERY

## 2025-05-22 PROCEDURE — 4004F PT TOBACCO SCREEN RCVD TLK: CPT | Performed by: SURGERY

## 2025-05-22 PROCEDURE — 1123F ACP DISCUSS/DSCN MKR DOCD: CPT | Performed by: SURGERY

## 2025-05-22 PROCEDURE — 99214 OFFICE O/P EST MOD 30 MIN: CPT | Performed by: SURGERY

## 2025-05-22 NOTE — PROGRESS NOTES
Chief Complaint:   Chief Complaint   Patient presents with    Circulatory Problem     (R) leg pain.         HPI: Patient came to the office with her nursing aide, for the evaluation of vascular status of the legs, recently was in the hospital admitted for multiple medical issues, in March underwent vascular evaluation and was found to have satisfactory arterial blood flow but at the patient's consent patient came back to the office complaining of pain in the right leg extending to the sacroiliac joint, hip area all the way to the foot, noted to have lumbosacral disc disease and lumbar radiculopathy in the past    Patient also complains of tingling and numbness of the some of the fingers on and off, patient noted diabetic neuropathy    Patient also has significant multiple medical risk factors including chronic hypoxic respiratory failure, uses 3 L of oxygen at nighttime, coronary artery disease with history of myocardial infarction, past history of congestive heart failure, not currently, postcardiac bypass surgery, hypertension, hyperlipidemia, diabetes mellitus etc.      Patient denies any focal lateralizing neurological symptoms like loss of speech, vision or loss of function of extremity    Patient can walk short distance slowly up to 1 block at a time limited because of her shortness of breath, and her lumbar radiculopathy etc., and denies any symptoms of rest pain    Allergies   Allergen Reactions    Latex Hives    Bee Venom Anaphylaxis    Dilaudid [Hydromorphone Hcl] Itching    Dye [Iodides] Hives and Shortness Of Breath    Keflex [Cephalexin] Itching and Rash    Bee Pollen     Cetirizine & Related Hives    Fentanyl Itching    Lasix [Furosemide] Other (See Comments)     Pt states she cramps up and gets headaches    Levaquin [Levofloxacin In D5w] Hives    Lipitor      MUSCLE SPASMS    Lyrica [Pregabalin]      Dream disturbances    Morphine Hives    Naproxen      Unsure of reaction;pt states able to take Aleve

## 2025-05-22 NOTE — TELEPHONE ENCOUNTER
I called and left a message for Steph.She has an appointment at [facility] on July 10, 2025. It is at 10:00 am. to register for a 10:30 am. [facility]. She should bring her Insurance cards, photo Id., and list of Medications. She can enter in the main entrance A.

## 2025-05-27 ENCOUNTER — TELEPHONE (OUTPATIENT)
Dept: FAMILY MEDICINE CLINIC | Age: 70
End: 2025-05-27

## 2025-06-04 ENCOUNTER — OFFICE VISIT (OUTPATIENT)
Dept: ENDOCRINOLOGY | Age: 70
End: 2025-06-04
Payer: MEDICARE

## 2025-06-04 ENCOUNTER — TELEPHONE (OUTPATIENT)
Dept: ENT CLINIC | Age: 70
End: 2025-06-04

## 2025-06-04 VITALS
HEIGHT: 62 IN | WEIGHT: 193 LBS | OXYGEN SATURATION: 98 % | TEMPERATURE: 97.6 F | SYSTOLIC BLOOD PRESSURE: 127 MMHG | BODY MASS INDEX: 35.51 KG/M2 | RESPIRATION RATE: 18 BRPM | DIASTOLIC BLOOD PRESSURE: 79 MMHG | HEART RATE: 99 BPM

## 2025-06-04 DIAGNOSIS — Z79.4 TYPE 2 DIABETES MELLITUS WITHOUT COMPLICATION, WITH LONG-TERM CURRENT USE OF INSULIN (HCC): Primary | ICD-10-CM

## 2025-06-04 DIAGNOSIS — Z79.4 TYPE 2 DIABETES MELLITUS WITH STAGE 3 CHRONIC KIDNEY DISEASE, WITH LONG-TERM CURRENT USE OF INSULIN, UNSPECIFIED WHETHER STAGE 3A OR 3B CKD (HCC): ICD-10-CM

## 2025-06-04 DIAGNOSIS — E55.9 VITAMIN D DEFICIENCY: ICD-10-CM

## 2025-06-04 DIAGNOSIS — E11.22 TYPE 2 DIABETES MELLITUS WITH STAGE 3 CHRONIC KIDNEY DISEASE, WITH LONG-TERM CURRENT USE OF INSULIN, UNSPECIFIED WHETHER STAGE 3A OR 3B CKD (HCC): ICD-10-CM

## 2025-06-04 DIAGNOSIS — N18.30 TYPE 2 DIABETES MELLITUS WITH STAGE 3 CHRONIC KIDNEY DISEASE, WITH LONG-TERM CURRENT USE OF INSULIN, UNSPECIFIED WHETHER STAGE 3A OR 3B CKD (HCC): ICD-10-CM

## 2025-06-04 DIAGNOSIS — E66.812 CLASS 2 SEVERE OBESITY DUE TO EXCESS CALORIES WITH SERIOUS COMORBIDITY AND BODY MASS INDEX (BMI) OF 35.0 TO 35.9 IN ADULT (HCC): ICD-10-CM

## 2025-06-04 DIAGNOSIS — E11.9 TYPE 2 DIABETES MELLITUS WITHOUT COMPLICATION, WITH LONG-TERM CURRENT USE OF INSULIN (HCC): Primary | ICD-10-CM

## 2025-06-04 DIAGNOSIS — E66.01 CLASS 2 SEVERE OBESITY DUE TO EXCESS CALORIES WITH SERIOUS COMORBIDITY AND BODY MASS INDEX (BMI) OF 35.0 TO 35.9 IN ADULT (HCC): ICD-10-CM

## 2025-06-04 DIAGNOSIS — E78.2 MIXED HYPERLIPIDEMIA: Chronic | ICD-10-CM

## 2025-06-04 PROCEDURE — 3074F SYST BP LT 130 MM HG: CPT | Performed by: NURSE PRACTITIONER

## 2025-06-04 PROCEDURE — 4004F PT TOBACCO SCREEN RCVD TLK: CPT | Performed by: NURSE PRACTITIONER

## 2025-06-04 PROCEDURE — 95251 CONT GLUC MNTR ANALYSIS I&R: CPT | Performed by: NURSE PRACTITIONER

## 2025-06-04 PROCEDURE — G8417 CALC BMI ABV UP PARAM F/U: HCPCS | Performed by: NURSE PRACTITIONER

## 2025-06-04 PROCEDURE — 1090F PRES/ABSN URINE INCON ASSESS: CPT | Performed by: NURSE PRACTITIONER

## 2025-06-04 PROCEDURE — 1159F MED LIST DOCD IN RCRD: CPT | Performed by: NURSE PRACTITIONER

## 2025-06-04 PROCEDURE — 2022F DILAT RTA XM EVC RTNOPTHY: CPT | Performed by: NURSE PRACTITIONER

## 2025-06-04 PROCEDURE — 1123F ACP DISCUSS/DSCN MKR DOCD: CPT | Performed by: NURSE PRACTITIONER

## 2025-06-04 PROCEDURE — G8399 PT W/DXA RESULTS DOCUMENT: HCPCS | Performed by: NURSE PRACTITIONER

## 2025-06-04 PROCEDURE — 3078F DIAST BP <80 MM HG: CPT | Performed by: NURSE PRACTITIONER

## 2025-06-04 PROCEDURE — 99214 OFFICE O/P EST MOD 30 MIN: CPT | Performed by: NURSE PRACTITIONER

## 2025-06-04 PROCEDURE — G8427 DOCREV CUR MEDS BY ELIG CLIN: HCPCS | Performed by: NURSE PRACTITIONER

## 2025-06-04 PROCEDURE — 3017F COLORECTAL CA SCREEN DOC REV: CPT | Performed by: NURSE PRACTITIONER

## 2025-06-04 PROCEDURE — 3044F HG A1C LEVEL LT 7.0%: CPT | Performed by: NURSE PRACTITIONER

## 2025-06-04 RX ORDER — INSULIN DEGLUDEC 200 U/ML
INJECTION, SOLUTION SUBCUTANEOUS
Qty: 3 ADJUSTABLE DOSE PRE-FILLED PEN SYRINGE | Refills: 3 | Status: SHIPPED | OUTPATIENT
Start: 2025-06-04

## 2025-06-04 RX ORDER — KETOROLAC TROMETHAMINE 30 MG/ML
INJECTION, SOLUTION INTRAMUSCULAR; INTRAVENOUS
Qty: 1 EACH | Refills: 0 | Status: SHIPPED | OUTPATIENT
Start: 2025-06-04

## 2025-06-04 RX ORDER — HYDROCHLOROTHIAZIDE 12.5 MG/1
CAPSULE ORAL
Qty: 6 EACH | Refills: 3 | Status: SHIPPED | OUTPATIENT
Start: 2025-06-04

## 2025-06-04 NOTE — TELEPHONE ENCOUNTER
Pt stopped into the office requesting that hearing aid clearance be sent to [facility]. Fax # [phone].     Please advise.    Electronically signed by Wilma Leyva on 6/4/2025 at 10:35 AM

## 2025-06-04 NOTE — PROGRESS NOTES
reports current drug use. Drug: Marijuana (Weed).    FAMILY HISTORY   Family History   Problem Relation Age of Onset    Colon Cancer Mother     Cancer Mother     Asthma Father     Colon Cancer Brother     Breast Cancer Maternal Aunt 74       ALLERGIES AND DRUG REACTIONS   Allergies   Allergen Reactions    Latex Hives    Bee Venom Anaphylaxis    Dilaudid [Hydromorphone Hcl] Itching    Dye [Iodides] Hives and Shortness Of Breath    Keflex [Cephalexin] Itching and Rash    Bee Pollen     Cetirizine & Related Hives    Fentanyl Itching    Lasix [Furosemide] Other (See Comments)     Pt states she cramps up and gets headaches    Levaquin [Levofloxacin In D5w] Hives    Lipitor      MUSCLE SPASMS    Lyrica [Pregabalin]      Dream disturbances    Morphine Hives    Naproxen      Unsure of reaction;pt states able to take Aleve without difficulties    Nefazodone Other (See Comments)    Norvasc [Amlodipine] Swelling    Oxycodone-Acetaminophen Swelling    Shellfish-Derived Products     Trazodone And Nefazodone        CURRENT MEDICATIONS   Current Outpatient Medications   Medication Sig Dispense Refill    Continuous Glucose  (FREESTYLE JAM 3 READER) MYRA 1 device 1 each 0    Continuous Glucose Sensor (FREESTYLE JAM 3 PLUS SENSOR) MISC Apply every 15 days 6 each 3    Insulin Degludec (TRESIBA FLEXTOUCH) 200 UNIT/ML SOPN Inject 5 units at night 3 Adjustable Dose Pre-filled Pen Syringe 3    blood glucose test strips (ACCU-CHEK GUIDE) strip Use to check sugar 4 times daily 400 each 3    Accu-Chek FastClix Lancets MISC       insulin lispro (HUMALOG) 100 UNIT/ML SOLN injection vial Inject 0-4 Units into the skin 3 times daily (with meals) *Per Sliding Scale*      topiramate (TOPAMAX) 25 MG tablet Take 1 tablet by mouth nightly 60 tablet 2    rimegepant sulfate (NURTEC) 75 MG TBDP Take 75 mg by mouth as needed (MIGRAINES) 8 tablet 11    azelastine (ASTELIN) 0.1 % nasal spray 1 spray by Nasal route 2 times daily Use in each

## 2025-06-09 ENCOUNTER — FOLLOWUP TELEPHONE ENCOUNTER (OUTPATIENT)
Dept: AUDIOLOGY | Age: 70
End: 2025-06-09

## 2025-06-09 NOTE — TELEPHONE ENCOUNTER
Patient called and LVM regarding hearing aid status and documentation sent to hearing aid practice. Called patient and explained that we have attempted to contact her multiple times and explained that we cannot give her documentation and clearance until ENT/NP clears her medically for hearing aids. At this time, she does not have clearance for hearing aids. Patient seemed frustrated on the phone, but explained that she would not be able to get hearing aids yet with an infection anyways. Told her that she has an appointment on 6/30 with Kenyon Robles at 11:15 where she can discuss this. Patient wrote down date and time.     Electronically signed by Carmen Ferrari on 6/9/2025 at 10:15 AM

## 2025-06-09 NOTE — TELEPHONE ENCOUNTER
Patient called back to discuss hearing aid clearance per provider will need to be treated for middle ear effusion and once treated will discuss hearing aid clearance.

## 2025-06-12 DIAGNOSIS — Z79.4 TYPE 2 DIABETES MELLITUS WITHOUT COMPLICATION, WITH LONG-TERM CURRENT USE OF INSULIN (HCC): ICD-10-CM

## 2025-06-12 DIAGNOSIS — E11.9 TYPE 2 DIABETES MELLITUS WITHOUT COMPLICATION, WITH LONG-TERM CURRENT USE OF INSULIN (HCC): ICD-10-CM

## 2025-06-12 RX ORDER — KETOROLAC TROMETHAMINE 30 MG/ML
INJECTION, SOLUTION INTRAMUSCULAR; INTRAVENOUS
Qty: 1 EACH | Refills: 0 | Status: SHIPPED | OUTPATIENT
Start: 2025-06-12

## 2025-06-18 ENCOUNTER — TELEPHONE (OUTPATIENT)
Dept: NEUROLOGY | Age: 70
End: 2025-06-18

## 2025-06-18 RX ORDER — RIMEGEPANT SULFATE 75 MG/75MG
75 TABLET, ORALLY DISINTEGRATING ORAL PRN
Qty: 8 TABLET | Refills: 11 | Status: CANCELLED | OUTPATIENT
Start: 2025-06-18

## 2025-06-18 NOTE — TELEPHONE ENCOUNTER
Notified pt that her EEG did not show any seizures.     Pt complaining of pain in back of head and neck. She is out of nurtec and needs a refill. Pt does not know if she is taking her Topamax. Spoke with pharmacy who states that they delivered it 5/12/25. Instructed pt to look for pill bottle.

## 2025-06-18 NOTE — TELEPHONE ENCOUNTER
Nurtec Approved on May 13 by [employer] 2017 NCP[employer]  Request Reference Number: [id number]. NURTEC TAB 75MG ODT is approved through 12/31/2025. Your patient may now fill this prescription and it will be covered.  Effective Date: 5/13/2025  Authorization Expiration Date: 12/31/2025

## 2025-06-30 ENCOUNTER — PROCEDURE VISIT (OUTPATIENT)
Dept: AUDIOLOGY | Age: 70
End: 2025-06-30
Payer: MEDICARE

## 2025-06-30 ENCOUNTER — OFFICE VISIT (OUTPATIENT)
Dept: ENT CLINIC | Age: 70
End: 2025-06-30
Payer: MEDICARE

## 2025-06-30 VITALS
TEMPERATURE: 97.5 F | DIASTOLIC BLOOD PRESSURE: 69 MMHG | BODY MASS INDEX: 34.23 KG/M2 | WEIGHT: 186 LBS | SYSTOLIC BLOOD PRESSURE: 135 MMHG | HEIGHT: 62 IN | OXYGEN SATURATION: 90 % | HEART RATE: 90 BPM

## 2025-06-30 DIAGNOSIS — H69.91 DYSFUNCTION OF RIGHT EUSTACHIAN TUBE: ICD-10-CM

## 2025-06-30 DIAGNOSIS — J30.1 SEASONAL ALLERGIC RHINITIS DUE TO POLLEN: Primary | ICD-10-CM

## 2025-06-30 DIAGNOSIS — H65.91 MEE (MIDDLE EAR EFFUSION), RIGHT: Primary | ICD-10-CM

## 2025-06-30 DIAGNOSIS — H92.01 RIGHT EAR PAIN: ICD-10-CM

## 2025-06-30 PROCEDURE — 1160F RVW MEDS BY RX/DR IN RCRD: CPT

## 2025-06-30 PROCEDURE — G8399 PT W/DXA RESULTS DOCUMENT: HCPCS

## 2025-06-30 PROCEDURE — 1123F ACP DISCUSS/DSCN MKR DOCD: CPT

## 2025-06-30 PROCEDURE — 4004F PT TOBACCO SCREEN RCVD TLK: CPT

## 2025-06-30 PROCEDURE — G8427 DOCREV CUR MEDS BY ELIG CLIN: HCPCS

## 2025-06-30 PROCEDURE — 1090F PRES/ABSN URINE INCON ASSESS: CPT

## 2025-06-30 PROCEDURE — 99213 OFFICE O/P EST LOW 20 MIN: CPT

## 2025-06-30 PROCEDURE — 3075F SYST BP GE 130 - 139MM HG: CPT

## 2025-06-30 PROCEDURE — G8417 CALC BMI ABV UP PARAM F/U: HCPCS

## 2025-06-30 PROCEDURE — 1159F MED LIST DOCD IN RCRD: CPT

## 2025-06-30 PROCEDURE — 3017F COLORECTAL CA SCREEN DOC REV: CPT

## 2025-06-30 PROCEDURE — 92567 TYMPANOMETRY: CPT | Performed by: AUDIOLOGIST

## 2025-06-30 PROCEDURE — 3078F DIAST BP <80 MM HG: CPT

## 2025-06-30 RX ORDER — FLUTICASONE PROPIONATE 50 MCG
2 SPRAY, SUSPENSION (ML) NASAL DAILY
Qty: 1 EACH | Refills: 3 | Status: SHIPPED | OUTPATIENT
Start: 2025-06-30

## 2025-06-30 RX ORDER — AZELASTINE 1 MG/ML
1 SPRAY, METERED NASAL 2 TIMES DAILY
Qty: 30 ML | Refills: 1 | Status: SHIPPED | OUTPATIENT
Start: 2025-06-30

## 2025-06-30 ASSESSMENT — ENCOUNTER SYMPTOMS
RHINORRHEA: 0
EYE DISCHARGE: 0
DIARRHEA: 0
ALLERGIC/IMMUNOLOGIC NEGATIVE: 1
SHORTNESS OF BREATH: 0
SORE THROAT: 0
VOMITING: 0
EYE PAIN: 0
COUGH: 0
BACK PAIN: 0
SINUS PRESSURE: 0

## 2025-06-30 NOTE — TELEPHONE ENCOUNTER
Can you see if her prior Auth for Nurtec was approved?  If not she can  more Nurtec samples in the office.  Thank you

## 2025-06-30 NOTE — PROGRESS NOTES
Cancer Mother     Asthma Father     Colon Cancer Brother     Breast Cancer Maternal Aunt 74     Social History     Socioeconomic History    Marital status:      Spouse name: None    Number of children: 2    Years of education: 14    Highest education level: None   Occupational History    Occupation: disability   Tobacco Use    Smoking status: Every Day     Current packs/day: 0.50     Average packs/day: 0.5 packs/day for 50.9 years (25.4 ttl pk-yrs)     Types: Cigarettes     Start date: 8/10/1974    Smokeless tobacco: Never   Vaping Use    Vaping status: Never Used   Substance and Sexual Activity    Alcohol use: Not Currently    Drug use: Yes     Types: Marijuana (Weed)     Comment: 4 times per week    Sexual activity: Defer   Social History Narrative    Pt lives with brother in her house-pt has never driven a car and depends on transportation     Social Drivers of Health     Financial Resource Strain: Low Risk  (9/20/2024)    Overall Financial Resource Strain (CARDIA)     Difficulty of Paying Living Expenses: Not hard at all   Food Insecurity: No Food Insecurity (3/24/2025)    Hunger Vital Sign     Worried About Running Out of Food in the Last Year: Never true     Ran Out of Food in the Last Year: Never true   Transportation Needs: No Transportation Needs (3/24/2025)    PRAPARE - Transportation     Lack of Transportation (Medical): No     Lack of Transportation (Non-Medical): No   Physical Activity: Inactive (2/19/2025)    Exercise Vital Sign     Days of Exercise per Week: 0 days     Minutes of Exercise per Session: 0 min   Housing Stability: Low Risk  (3/24/2025)    Housing Stability Vital Sign     Unable to Pay for Housing in the Last Year: No     Number of Times Moved in the Last Year: 0     Homeless in the Last Year: No     Allergies   Allergen Reactions    Latex Hives    Bee Venom Anaphylaxis    Dilaudid [Hydromorphone Hcl] Itching    Dye [Iodides] Hives and Shortness Of Breath    Keflex [Cephalexin]

## 2025-06-30 NOTE — PROGRESS NOTES
This patient was referred for audiometric/tympanometric testing by SHUKRI Winchester due to LADONNA.        Tympanometry revealed negative middle ear pressure (-276 daPa), in the right ear. Left ear revealed peak pressure and compliance within normal limits.         The results were reviewed with the patient and CNP.     Recommendations for follow up will be made pending ordering provider consult.    Carmen Lyons/CCC-A  [place] Lic # [id number]

## 2025-07-01 ENCOUNTER — TELEPHONE (OUTPATIENT)
Dept: ENT CLINIC | Age: 70
End: 2025-07-01

## 2025-07-01 NOTE — TELEPHONE ENCOUNTER
Patient was seen in the office yesterday 6/30/2025 and was looking for hearing aid clearance.  Audio was unable to be reviewed at the appointment as we did not have a copy of it.  At the appointment the patient's family did contact [facility] where the audiogram was performed and they stated that they would fax it over.  At this time we have yet to receive the copy of the audiogram can we please request the records from them so we can provide the patient with a hearing aid clearance.  The middle ear effusion has resolved but I cannot get full clearance without seeing the audiogram.  Thank you.

## 2025-07-02 RX ORDER — RIMEGEPANT SULFATE 75 MG/75MG
75 TABLET, ORALLY DISINTEGRATING ORAL PRN
Qty: 8 TABLET | Refills: 11 | Status: SHIPPED | OUTPATIENT
Start: 2025-07-02

## 2025-07-02 NOTE — TELEPHONE ENCOUNTER
Pt returned call to office and left a voicemail.  She would like a call back.  Electronically signed by Wilma Leyva on 7/2/2025 at 11:32 AM

## 2025-07-02 NOTE — TELEPHONE ENCOUNTER
Called patient to advise provider's recommendations patient will contact [facility] to schedule for hearing test once hearing test is finished advised to have faxed to office [phone] so that provider can review results.

## 2025-07-02 NOTE — TELEPHONE ENCOUNTER
Called patient in regards to recommendations of hearing test through [facility] vs outpatient audiology left a detailed voicemail.

## 2025-07-02 NOTE — TELEPHONE ENCOUNTER
Per provider need updated audiogram for hearing aid clearance. Patient can have [facility] repeat hearing test and have faxed to office or schedule outpatient with [facility].

## 2025-07-10 ENCOUNTER — HOSPITAL ENCOUNTER (OUTPATIENT)
Dept: INTERVENTIONAL RADIOLOGY/VASCULAR | Age: 70
Discharge: HOME | End: 2025-07-12
Attending: SURGERY
Payer: MEDICARE

## 2025-07-10 DIAGNOSIS — I73.9 PVD (PERIPHERAL VASCULAR DISEASE) WITH CLAUDICATION: ICD-10-CM

## 2025-07-10 DIAGNOSIS — Z98.62 STATUS POST PERIPHERAL ARTERY ANGIOPLASTY: ICD-10-CM

## 2025-07-10 DIAGNOSIS — M79.604 PAIN IN RIGHT LEG: ICD-10-CM

## 2025-07-10 PROCEDURE — 93923 UPR/LXTR ART STDY 3+ LVLS: CPT

## 2025-07-14 ENCOUNTER — TELEPHONE (OUTPATIENT)
Dept: AUDIOLOGY | Age: 70
End: 2025-07-14

## 2025-07-14 NOTE — TELEPHONE ENCOUNTER
[facility] called to discuss patient care.  They can not do another audio because it has been less than a year. Explained they need to speak with the providers office staff, not audiology and that I would route them a message to reach out.

## 2025-07-16 ENCOUNTER — TELEPHONE (OUTPATIENT)
Dept: VASCULAR SURGERY | Age: 70
End: 2025-07-16

## 2025-07-16 NOTE — TELEPHONE ENCOUNTER
The lower extremity artery Doppler study that was done was personally reviewed by me, official report not available yet even though the study was done more than 6 days ago    The ankle-brachial index, within normal range on the right and 0.9 on the left    At the left femoral artery level, patient has almost triphasic right femoral Doppler tracing, biphasic left femoral Doppler tracing, the ankle, bilaterally biphasic ankle Doppler tracings noted with adequate flow to both feet based upon the pulse volume recordings over the metatarsals    I compared the study that was done in February of this year, overall though no significant changes noted, adequate flow noted in both studies to feet based upon the pulse volume recordings and ankle Doppler tracings    I called the patient, left a message on the phone, you know she does have peripheral vascular disease, stating that her current set of symptoms namely pain in the sacroiliac hip area with radiation to already down to the leg is consistent with a muscular neurologic etiology like lumbar radiculopathy particular in view of history of known lumbosacral disc disease and radiculopathy in the past    Patient was advised to call the office to discuss further

## 2025-07-17 ENCOUNTER — TELEPHONE (OUTPATIENT)
Dept: CARDIOLOGY CLINIC | Age: 70
End: 2025-07-17

## 2025-07-17 ENCOUNTER — HOSPITAL ENCOUNTER (OUTPATIENT)
Dept: AUDIOLOGY | Age: 70
Discharge: HOME | End: 2025-07-17

## 2025-07-17 NOTE — TELEPHONE ENCOUNTER
Patient called stating she is being scheduled for a colonoscopy at [facility].  She would like know if Dr. Boateng still feels like she would have a 15% chance of a heart attack if she had a colonoscopy.

## 2025-07-17 NOTE — PROGRESS NOTES
Patient left message that she would like to reschedule hearing test due to illness.     Called patient and left message.     Electronically signed by Carmen Vicente on 7/17/2025 at 10:18 AM

## 2025-07-17 NOTE — TELEPHONE ENCOUNTER
Based on her medical conditions, still has 15% risk of major perioperative cardiac events that includes risk of heart attack congestive heart failure or abnormal rhythm.  In other words, 85% chance of not having any major issues.  She is going for a colonoscopy that is considered low risk procedure.

## 2025-07-18 ENCOUNTER — HOSPITAL ENCOUNTER (OUTPATIENT)
Age: 70
Discharge: HOME | End: 2025-07-18
Payer: MEDICARE

## 2025-07-18 LAB
ANION GAP SERPL CALCULATED.3IONS-SCNC: 12 MMOL/L (ref 7–16)
BASOPHILS # BLD: 0.02 K/UL (ref 0–0.2)
BASOPHILS NFR BLD: 0 % (ref 0–2)
BUN SERPL-MCNC: 34 MG/DL (ref 8–23)
CALCIUM SERPL-MCNC: 8.9 MG/DL (ref 8.8–10.2)
CHLORIDE SERPL-SCNC: 108 MMOL/L (ref 98–107)
CO2 SERPL-SCNC: 20 MMOL/L (ref 22–29)
CREAT SERPL-MCNC: 1.6 MG/DL (ref 0.5–1)
EOSINOPHIL # BLD: 0.14 K/UL (ref 0.05–0.5)
EOSINOPHILS RELATIVE PERCENT: 2 % (ref 0–6)
ERYTHROCYTE [DISTWIDTH] IN BLOOD BY AUTOMATED COUNT: 12.8 % (ref 11.5–15)
GFR, ESTIMATED: 35 ML/MIN/1.73M2
GLUCOSE SERPL-MCNC: 132 MG/DL (ref 74–99)
HCT VFR BLD AUTO: 35.8 % (ref 34–48)
HGB BLD-MCNC: 11.6 G/DL (ref 11.5–15.5)
IMM GRANULOCYTES # BLD AUTO: <0.03 K/UL (ref 0–0.58)
IMM GRANULOCYTES NFR BLD: 0 % (ref 0–5)
LYMPHOCYTES NFR BLD: 0.79 K/UL (ref 1.5–4)
LYMPHOCYTES RELATIVE PERCENT: 10 % (ref 20–42)
MCH RBC QN AUTO: 31.8 PG (ref 26–35)
MCHC RBC AUTO-ENTMCNC: 32.4 G/DL (ref 32–34.5)
MCV RBC AUTO: 98.1 FL (ref 80–99.9)
MONOCYTES NFR BLD: 0.37 K/UL (ref 0.1–0.95)
MONOCYTES NFR BLD: 5 % (ref 2–12)
NEUTROPHILS NFR BLD: 83 % (ref 43–80)
NEUTS SEG NFR BLD: 6.35 K/UL (ref 1.8–7.3)
PLATELET # BLD AUTO: 267 K/UL (ref 130–450)
PMV BLD AUTO: 9.8 FL (ref 7–12)
POTASSIUM SERPL-SCNC: 4.6 MMOL/L (ref 3.5–5.1)
RBC # BLD AUTO: 3.65 M/UL (ref 3.5–5.5)
SODIUM SERPL-SCNC: 139 MMOL/L (ref 136–145)
WBC OTHER # BLD: 7.7 K/UL (ref 4.5–11.5)

## 2025-07-18 PROCEDURE — 36415 COLL VENOUS BLD VENIPUNCTURE: CPT

## 2025-07-18 PROCEDURE — 80048 BASIC METABOLIC PNL TOTAL CA: CPT

## 2025-07-18 PROCEDURE — 85025 COMPLETE CBC W/AUTO DIFF WBC: CPT

## 2025-07-19 ENCOUNTER — TELEPHONE (OUTPATIENT)
Dept: VASCULAR SURGERY | Age: 70
End: 2025-07-19

## 2025-07-19 NOTE — TELEPHONE ENCOUNTER
Left message again for the patient regarding the test results, symptoms most probably neurological or musculoskeletal issue particularly in view of known lumbosacral radiculopathy etc., to call the office to discuss further

## 2025-07-21 ENCOUNTER — TELEPHONE (OUTPATIENT)
Dept: VASCULAR SURGERY | Age: 70
End: 2025-07-21

## 2025-07-21 ENCOUNTER — TELEPHONE (OUTPATIENT)
Dept: ADMINISTRATIVE | Age: 70
End: 2025-07-21

## 2025-07-21 DIAGNOSIS — I73.9 PVD (PERIPHERAL VASCULAR DISEASE) WITH CLAUDICATION: Primary | ICD-10-CM

## 2025-07-21 NOTE — TELEPHONE ENCOUNTER
Today finally I was able to reach the patient, spoke to her about the test results as outlined below      The lower extremity artery Doppler study that was done was personally reviewed by me, official report not available yet even though the study was done more than 6 days ago     The ankle-brachial index, within normal range on the right and 0.9 on the left     At the left femoral artery level, patient has almost triphasic right femoral Doppler tracing, biphasic left femoral Doppler tracing, the ankle, bilaterally biphasic ankle Doppler tracings noted with adequate flow to both feet based upon the pulse volume recordings over the metatarsals     I compared the study that was done in February of this year, overall though no significant changes noted, adequate flow noted in both studies to feet based upon the pulse volume recordings and ankle Doppler tracings     Today I explained to the patient and was informed that she does have peripheral vascular disease, she has adequate collateral flow, however, her current set of symptoms namely pain in the sacroiliac hip area with radiation to already down to the leg with some numbness is consistent with a muscular neurologic etiology like lumbar radiculopathy particular in view of history of known lumbosacral disc disease and radiculopathy in the past    Patient is extremely unhappy about her symptoms, she tells me she did contact her PCP, who is not doing much according to her    In the beginning, she wanted me to communicate with her podiatrist but unable to know his name and I informed her that I will check the record to see if I can contact him and let him know what is going on    I also offered a second opinion, initially she is agreeable to see Dr. Gregg    However she told me that she is made an appointment on her own to see a gastroenterologist, Dr. Santana at the [facility] and at that time further discussion led to the request of the patient that she

## 2025-07-21 NOTE — TELEPHONE ENCOUNTER
Pt returned call to say she is unable to take the appt that was offered today with Kenyon Robles.  She is having a colonoscopy tomorrow and has already started the prep. She will keep 8/4 appt.

## 2025-07-23 ENCOUNTER — TELEPHONE (OUTPATIENT)
Dept: VASCULAR SURGERY | Age: 70
End: 2025-07-23

## 2025-07-23 NOTE — TELEPHONE ENCOUNTER
Received referral from Dr. Skinner for Dr. Hernandez regarding PVD with claudication, left message for patient to return call to schedule.

## 2025-07-25 ENCOUNTER — TELEPHONE (OUTPATIENT)
Dept: FAMILY MEDICINE CLINIC | Age: 70
End: 2025-07-25

## 2025-07-27 DIAGNOSIS — J30.1 SEASONAL ALLERGIC RHINITIS DUE TO POLLEN: Primary | ICD-10-CM

## 2025-07-27 DIAGNOSIS — H69.91 DYSFUNCTION OF RIGHT EUSTACHIAN TUBE: ICD-10-CM

## 2025-07-28 RX ORDER — AZELASTINE HYDROCHLORIDE 137 UG/1
SPRAY, METERED NASAL
Qty: 90 ML | Refills: 3 | Status: SHIPPED | OUTPATIENT
Start: 2025-07-28

## 2025-07-31 ENCOUNTER — TELEPHONE (OUTPATIENT)
Dept: NON INVASIVE DIAGNOSTICS | Age: 70
End: 2025-07-31

## 2025-08-04 ENCOUNTER — TELEPHONE (OUTPATIENT)
Dept: CARDIOLOGY CLINIC | Age: 70
End: 2025-08-04

## 2025-08-06 ENCOUNTER — TELEPHONE (OUTPATIENT)
Dept: FAMILY MEDICINE CLINIC | Age: 70
End: 2025-08-06

## 2025-08-07 ENCOUNTER — OFFICE VISIT (OUTPATIENT)
Age: 70
End: 2025-08-07

## 2025-08-07 ENCOUNTER — TELEPHONE (OUTPATIENT)
Dept: FAMILY MEDICINE CLINIC | Age: 70
End: 2025-08-07

## 2025-08-07 VITALS
TEMPERATURE: 98 F | HEART RATE: 94 BPM | WEIGHT: 184.7 LBS | BODY MASS INDEX: 33.99 KG/M2 | DIASTOLIC BLOOD PRESSURE: 90 MMHG | SYSTOLIC BLOOD PRESSURE: 154 MMHG | RESPIRATION RATE: 18 BRPM | OXYGEN SATURATION: 93 % | HEIGHT: 62 IN

## 2025-08-07 DIAGNOSIS — G43.709 CHRONIC MIGRAINE W/O AURA W/O STATUS MIGRAINOSUS, NOT INTRACTABLE: Primary | ICD-10-CM

## 2025-08-07 RX ORDER — SENNOSIDES 8.6 MG
650 CAPSULE ORAL EVERY 8 HOURS PRN
COMMUNITY
Start: 2025-06-27

## 2025-08-07 RX ORDER — CHLORHEXIDINE/GLYCERIN/HE-CELL
296 JELLY (GRAM) TOPICAL ONCE
COMMUNITY
Start: 2025-07-30

## 2025-08-07 RX ORDER — CYCLOBENZAPRINE HCL 10 MG
10 TABLET ORAL 3 TIMES DAILY PRN
COMMUNITY
Start: 2025-07-30

## 2025-08-07 RX ORDER — SENNOSIDES 8.6 MG
1 TABLET ORAL DAILY
COMMUNITY
Start: 2025-07-30

## 2025-08-07 RX ORDER — EPINEPHRINE 0.3 MG/.3ML
0.3 INJECTION SUBCUTANEOUS ONCE
COMMUNITY
Start: 2025-06-11

## 2025-08-07 RX ORDER — HYDRALAZINE HYDROCHLORIDE 50 MG/1
50 TABLET, FILM COATED ORAL 3 TIMES DAILY
COMMUNITY
Start: 2025-08-02

## 2025-08-07 RX ORDER — DOCUSATE SODIUM 100 MG/1
100 CAPSULE, LIQUID FILLED ORAL 2 TIMES DAILY PRN
COMMUNITY
Start: 2025-07-24

## 2025-08-07 RX ORDER — LORATADINE 10 MG/1
10 TABLET ORAL DAILY
COMMUNITY
Start: 2025-06-11

## 2025-08-07 RX ORDER — TOPIRAMATE 50 MG/1
50 TABLET, FILM COATED ORAL 2 TIMES DAILY
Qty: 60 TABLET | Refills: 2 | Status: SHIPPED | OUTPATIENT
Start: 2025-08-07

## 2025-08-10 DIAGNOSIS — Z76.0 MEDICATION REFILL: ICD-10-CM

## 2025-08-12 ENCOUNTER — OFFICE VISIT (OUTPATIENT)
Dept: CARDIOLOGY CLINIC | Age: 70
End: 2025-08-12
Payer: MEDICARE

## 2025-08-12 VITALS
DIASTOLIC BLOOD PRESSURE: 80 MMHG | OXYGEN SATURATION: 100 % | TEMPERATURE: 97 F | HEART RATE: 94 BPM | HEIGHT: 62 IN | RESPIRATION RATE: 18 BRPM | BODY MASS INDEX: 34.91 KG/M2 | SYSTOLIC BLOOD PRESSURE: 124 MMHG | WEIGHT: 189.7 LBS

## 2025-08-12 DIAGNOSIS — I46.9 CARDIOPULMONARY ARREST (HCC): ICD-10-CM

## 2025-08-12 DIAGNOSIS — Z95.1 STATUS POST AORTO-CORONARY ARTERY BYPASS GRAFT: ICD-10-CM

## 2025-08-12 DIAGNOSIS — I50.32 HEART FAILURE WITH IMPROVED EJECTION FRACTION (HFIMPEF) (HCC): Primary | ICD-10-CM

## 2025-08-12 DIAGNOSIS — I10 PRIMARY HYPERTENSION: ICD-10-CM

## 2025-08-12 DIAGNOSIS — E78.00 PURE HYPERCHOLESTEROLEMIA: ICD-10-CM

## 2025-08-12 PROCEDURE — G8427 DOCREV CUR MEDS BY ELIG CLIN: HCPCS | Performed by: INTERNAL MEDICINE

## 2025-08-12 PROCEDURE — 4004F PT TOBACCO SCREEN RCVD TLK: CPT | Performed by: INTERNAL MEDICINE

## 2025-08-12 PROCEDURE — 1123F ACP DISCUSS/DSCN MKR DOCD: CPT | Performed by: INTERNAL MEDICINE

## 2025-08-12 PROCEDURE — 1159F MED LIST DOCD IN RCRD: CPT | Performed by: INTERNAL MEDICINE

## 2025-08-12 PROCEDURE — 3079F DIAST BP 80-89 MM HG: CPT | Performed by: INTERNAL MEDICINE

## 2025-08-12 PROCEDURE — 99214 OFFICE O/P EST MOD 30 MIN: CPT | Performed by: INTERNAL MEDICINE

## 2025-08-12 PROCEDURE — G8399 PT W/DXA RESULTS DOCUMENT: HCPCS | Performed by: INTERNAL MEDICINE

## 2025-08-12 PROCEDURE — 3017F COLORECTAL CA SCREEN DOC REV: CPT | Performed by: INTERNAL MEDICINE

## 2025-08-12 PROCEDURE — 3074F SYST BP LT 130 MM HG: CPT | Performed by: INTERNAL MEDICINE

## 2025-08-12 PROCEDURE — G8417 CALC BMI ABV UP PARAM F/U: HCPCS | Performed by: INTERNAL MEDICINE

## 2025-08-12 PROCEDURE — 1090F PRES/ABSN URINE INCON ASSESS: CPT | Performed by: INTERNAL MEDICINE

## 2025-08-12 PROCEDURE — 93000 ELECTROCARDIOGRAM COMPLETE: CPT | Performed by: INTERNAL MEDICINE

## 2025-08-12 PROCEDURE — G2211 COMPLEX E/M VISIT ADD ON: HCPCS | Performed by: INTERNAL MEDICINE

## 2025-08-12 RX ORDER — SIMETHICONE 80 MG
80 TABLET,CHEWABLE ORAL EVERY 6 HOURS PRN
COMMUNITY
Start: 2025-07-30

## 2025-08-12 RX ORDER — POLYETHYLENE GLYCOL 3350 17 G/17G
POWDER, FOR SOLUTION ORAL
COMMUNITY
Start: 2025-07-30

## 2025-08-12 RX ORDER — MIRABEGRON 50 MG/1
50 TABLET, FILM COATED, EXTENDED RELEASE ORAL DAILY
COMMUNITY

## 2025-08-12 RX ORDER — TRAZODONE HYDROCHLORIDE 150 MG/1
150 TABLET ORAL NIGHTLY
Qty: 90 TABLET | Refills: 1 | Status: SHIPPED | OUTPATIENT
Start: 2025-08-12

## 2025-08-15 ENCOUNTER — TELEPHONE (OUTPATIENT)
Dept: CARDIOLOGY CLINIC | Age: 70
End: 2025-08-15

## 2025-08-20 ENCOUNTER — OFFICE VISIT (OUTPATIENT)
Dept: NON INVASIVE DIAGNOSTICS | Age: 70
End: 2025-08-20
Payer: MEDICARE

## 2025-08-20 VITALS
WEIGHT: 187.6 LBS | BODY MASS INDEX: 35.42 KG/M2 | HEART RATE: 102 BPM | TEMPERATURE: 97.7 F | DIASTOLIC BLOOD PRESSURE: 64 MMHG | RESPIRATION RATE: 18 BRPM | HEIGHT: 61 IN | SYSTOLIC BLOOD PRESSURE: 118 MMHG | OXYGEN SATURATION: 97 %

## 2025-08-20 DIAGNOSIS — F12.90 MARIJUANA USE: Chronic | ICD-10-CM

## 2025-08-20 DIAGNOSIS — Z95.818 STATUS POST PLACEMENT OF IMPLANTABLE LOOP RECORDER: ICD-10-CM

## 2025-08-20 DIAGNOSIS — I97.89 BRADYCARDIA FOLLOWING SURGERY: ICD-10-CM

## 2025-08-20 DIAGNOSIS — I49.9 IRREGULAR HEART BEAT: Primary | ICD-10-CM

## 2025-08-20 DIAGNOSIS — F17.200 TOBACCO DEPENDENCE: ICD-10-CM

## 2025-08-20 PROCEDURE — G8427 DOCREV CUR MEDS BY ELIG CLIN: HCPCS | Performed by: NURSE PRACTITIONER

## 2025-08-20 PROCEDURE — 1090F PRES/ABSN URINE INCON ASSESS: CPT | Performed by: NURSE PRACTITIONER

## 2025-08-20 PROCEDURE — 3074F SYST BP LT 130 MM HG: CPT | Performed by: NURSE PRACTITIONER

## 2025-08-20 PROCEDURE — G8417 CALC BMI ABV UP PARAM F/U: HCPCS | Performed by: NURSE PRACTITIONER

## 2025-08-20 PROCEDURE — 1123F ACP DISCUSS/DSCN MKR DOCD: CPT | Performed by: NURSE PRACTITIONER

## 2025-08-20 PROCEDURE — 3017F COLORECTAL CA SCREEN DOC REV: CPT | Performed by: NURSE PRACTITIONER

## 2025-08-20 PROCEDURE — 99214 OFFICE O/P EST MOD 30 MIN: CPT | Performed by: NURSE PRACTITIONER

## 2025-08-20 PROCEDURE — 1159F MED LIST DOCD IN RCRD: CPT | Performed by: NURSE PRACTITIONER

## 2025-08-20 PROCEDURE — G8399 PT W/DXA RESULTS DOCUMENT: HCPCS | Performed by: NURSE PRACTITIONER

## 2025-08-20 PROCEDURE — 4004F PT TOBACCO SCREEN RCVD TLK: CPT | Performed by: NURSE PRACTITIONER

## 2025-08-20 PROCEDURE — 3078F DIAST BP <80 MM HG: CPT | Performed by: NURSE PRACTITIONER

## 2025-08-20 RX ORDER — SACUBITRIL AND VALSARTAN 24; 26 MG/1; MG/1
1 TABLET ORAL 2 TIMES DAILY
COMMUNITY

## 2025-09-03 RX ORDER — SACUBITRIL AND VALSARTAN 24; 26 MG/1; MG/1
1 TABLET ORAL 2 TIMES DAILY
Qty: 180 TABLET | Refills: 3 | Status: SHIPPED | OUTPATIENT
Start: 2025-09-03

## (undated) DEVICE — KIT,ANTI FOG,W/SPONGE & FLUID,SOFT PACK: Brand: MEDLINE

## (undated) DEVICE — CORD FIBEROPTIC ENT

## (undated) DEVICE — FILTER PAPER CASSETTE BIOP PLSTC PNK HNGD

## (undated) DEVICE — GOWN,SIRUS,FABRNF,L,20/CS: Brand: MEDLINE

## (undated) DEVICE — HOLDERS JAKO

## (undated) DEVICE — TOWEL,OR,DSP,ST,BLUE,STD,6/PK,12PK/CS: Brand: MEDLINE

## (undated) DEVICE — MARKER,SKIN,WI/RULER AND LABELS: Brand: MEDLINE

## (undated) DEVICE — BASIC SINGLE BASIN 1-LF: Brand: MEDLINE INDUSTRIES, INC.

## (undated) DEVICE — SUCTION VELVET EYE SHRT

## (undated) DEVICE — SOLUTION IV IRRIG POUR BRL 0.9% SODIUM CHL 2F7124

## (undated) DEVICE — SET FORCEP MICROFRANCE LARYNGEAL

## (undated) DEVICE — GLOVE ORANGE PI 8 1/2   MSG9085

## (undated) DEVICE — ENDOTRACH TUBE 7060450 LASER SHLD 7.5MM: Brand: LASER-SHIELD®

## (undated) DEVICE — 4-PORT MANIFOLD: Brand: NEPTUNE 2

## (undated) DEVICE — SUCTION LARYNGEAL OPEN ENDED SHRT

## (undated) DEVICE — ENDOTRACH TUBE 7060300 LASER SHLD 6MM: Brand: LASER-SHIELD®

## (undated) DEVICE — SURGICAL PROCEDURE PACK EENT CUST

## (undated) DEVICE — PAD, DEFIB, ADULT, RADIOTRAN, PHYSIO, LO: Brand: MEDLINE

## (undated) DEVICE — CODMAN® SURGICAL PATTIES 1/2" X 1/2" (1.27CM X 1.27CM): Brand: CODMAN®

## (undated) DEVICE — SUCTION MICROLARYNGEAL SHRT

## (undated) DEVICE — SOLUTION IV 500ML 0.9% SOD CHL PH 5 INJ USP VIAFLX PLAS

## (undated) DEVICE — LIGHT SOURCE BL